# Patient Record
Sex: FEMALE | Race: BLACK OR AFRICAN AMERICAN | NOT HISPANIC OR LATINO | Employment: OTHER | URBAN - METROPOLITAN AREA
[De-identification: names, ages, dates, MRNs, and addresses within clinical notes are randomized per-mention and may not be internally consistent; named-entity substitution may affect disease eponyms.]

---

## 2017-08-31 PROBLEM — I10 HYPERTENSION: Status: ACTIVE | Noted: 2017-08-31

## 2017-08-31 PROBLEM — E78.5 HYPERLIPEMIA: Status: ACTIVE | Noted: 2017-08-31

## 2017-08-31 PROBLEM — J01.41 ACUTE RECURRENT PANSINUSITIS: Status: ACTIVE | Noted: 2017-08-31

## 2017-08-31 PROBLEM — I25.10 CORONARY ARTERY DISEASE: Status: ACTIVE | Noted: 2017-08-31

## 2017-08-31 PROBLEM — E11.9 DIABETES MELLITUS: Status: ACTIVE | Noted: 2017-08-31

## 2017-09-02 PROBLEM — E66.01 MORBID OBESITY DUE TO EXCESS CALORIES: Status: ACTIVE | Noted: 2017-09-02

## 2017-12-04 PROBLEM — J01.41 ACUTE RECURRENT PANSINUSITIS: Status: RESOLVED | Noted: 2017-08-31 | Resolved: 2017-12-04

## 2018-06-28 ENCOUNTER — LAB VISIT (OUTPATIENT)
Dept: LAB | Facility: HOSPITAL | Age: 68
End: 2018-06-28
Attending: INTERNAL MEDICINE
Payer: MEDICARE

## 2018-06-28 ENCOUNTER — OFFICE VISIT (OUTPATIENT)
Dept: CARDIOLOGY | Facility: CLINIC | Age: 68
End: 2018-06-28
Payer: MEDICARE

## 2018-06-28 VITALS
BODY MASS INDEX: 45.99 KG/M2 | WEIGHT: 293 LBS | SYSTOLIC BLOOD PRESSURE: 182 MMHG | DIASTOLIC BLOOD PRESSURE: 80 MMHG | HEIGHT: 67 IN | HEART RATE: 64 BPM

## 2018-06-28 DIAGNOSIS — I10 MALIGNANT HYPERTENSION: Primary | ICD-10-CM

## 2018-06-28 DIAGNOSIS — E66.01 MORBID OBESITY DUE TO EXCESS CALORIES: ICD-10-CM

## 2018-06-28 DIAGNOSIS — R60.0 LOWER LEG EDEMA: ICD-10-CM

## 2018-06-28 DIAGNOSIS — Z00.00 ROUTINE CHECK-UP: ICD-10-CM

## 2018-06-28 DIAGNOSIS — G47.33 OBSTRUCTIVE SLEEP APNEA: ICD-10-CM

## 2018-06-28 DIAGNOSIS — I10 MALIGNANT HYPERTENSION: ICD-10-CM

## 2018-06-28 DIAGNOSIS — R01.1 UNDIAGNOSED CARDIAC MURMURS: ICD-10-CM

## 2018-06-28 LAB
ALBUMIN SERPL BCP-MCNC: 3.2 G/DL
ALP SERPL-CCNC: 108 U/L
ALT SERPL W/O P-5'-P-CCNC: 29 U/L
ANION GAP SERPL CALC-SCNC: 7 MMOL/L
AST SERPL-CCNC: 23 U/L
BILIRUB SERPL-MCNC: 0.3 MG/DL
BUN SERPL-MCNC: 17 MG/DL
CALCIUM SERPL-MCNC: 10.2 MG/DL
CHLORIDE SERPL-SCNC: 108 MMOL/L
CO2 SERPL-SCNC: 30 MMOL/L
CREAT SERPL-MCNC: 1.1 MG/DL
EST. GFR  (AFRICAN AMERICAN): >60 ML/MIN/1.73 M^2
EST. GFR  (NON AFRICAN AMERICAN): 52.4 ML/MIN/1.73 M^2
GLUCOSE SERPL-MCNC: 268 MG/DL
POTASSIUM SERPL-SCNC: 4.6 MMOL/L
PROT SERPL-MCNC: 6.9 G/DL
SODIUM SERPL-SCNC: 145 MMOL/L

## 2018-06-28 PROCEDURE — 99999 PR PBB SHADOW E&M-NEW PATIENT-LVL IV: CPT | Mod: PBBFAC,,, | Performed by: INTERNAL MEDICINE

## 2018-06-28 PROCEDURE — 80053 COMPREHEN METABOLIC PANEL: CPT

## 2018-06-28 PROCEDURE — 36415 COLL VENOUS BLD VENIPUNCTURE: CPT | Mod: PO

## 2018-06-28 PROCEDURE — 93005 ELECTROCARDIOGRAM TRACING: CPT | Mod: PBBFAC,PO | Performed by: INTERNAL MEDICINE

## 2018-06-28 PROCEDURE — 93010 ELECTROCARDIOGRAM REPORT: CPT | Mod: S$PBB,,, | Performed by: INTERNAL MEDICINE

## 2018-06-28 PROCEDURE — 99204 OFFICE O/P NEW MOD 45 MIN: CPT | Mod: PBBFAC,PO | Performed by: INTERNAL MEDICINE

## 2018-06-28 PROCEDURE — 99202 OFFICE O/P NEW SF 15 MIN: CPT | Mod: S$PBB,,, | Performed by: INTERNAL MEDICINE

## 2018-06-28 RX ORDER — POTASSIUM CHLORIDE 750 MG/1
10 TABLET, EXTENDED RELEASE ORAL DAILY
COMMUNITY
Start: 2018-05-18 | End: 2018-11-15

## 2018-06-28 RX ORDER — HYDRALAZINE HYDROCHLORIDE 25 MG/1
25 TABLET, FILM COATED ORAL 3 TIMES DAILY
Qty: 90 TABLET | Refills: 1 | Status: SHIPPED | OUTPATIENT
Start: 2018-06-28 | End: 2018-08-31 | Stop reason: DRUGHIGH

## 2018-06-28 RX ORDER — TORSEMIDE 20 MG/1
20 TABLET ORAL DAILY PRN
COMMUNITY
Start: 2018-05-18 | End: 2018-11-28 | Stop reason: SDUPTHER

## 2018-06-28 RX ORDER — INDOMETHACIN 25 MG/1
CAPSULE ORAL
COMMUNITY
Start: 2018-04-25 | End: 2018-11-15

## 2018-06-28 RX ORDER — HYDROCHLOROTHIAZIDE 12.5 MG/1
TABLET ORAL
COMMUNITY
Start: 2018-05-30 | End: 2018-11-15

## 2018-06-28 RX ORDER — METFORMIN HYDROCHLORIDE 500 MG/1
500 TABLET ORAL
COMMUNITY
Start: 2018-06-17 | End: 2018-11-28 | Stop reason: CLARIF

## 2018-06-28 RX ORDER — VALSARTAN 160 MG/1
TABLET ORAL
COMMUNITY
Start: 2018-05-26 | End: 2018-08-31

## 2018-06-28 NOTE — PROGRESS NOTES
Subjective:    Patient ID:  Juhi Taylor is a 68 y.o. female who presents for Hypertension and Shortness of Breath        Hypertension   This is a chronic problem. The current episode started more than 1 year ago. The problem has been gradually worsening since onset. Pertinent negatives include no blurred vision, chest pain, malaise/fatigue, orthopnea, palpitations, PND or shortness of breath (SOME). Risk factors for coronary artery disease include dyslipidemia and obesity. Past treatments include calcium channel blockers, beta blockers, angiotensin blockers and diuretics. The current treatment provides mild improvement.   Shortness of Breath   Pertinent negatives include no abdominal pain, chest pain, claudication, fever, hemoptysis, leg swelling (WITH DILTIAZEM , BETTER OFF IT), orthopnea, PND, syncope or wheezing.     PT IS NEW TO THIS CLINIC, WAS SEEN IN 2016,NO RECORDS,STOPPED SMOKING IN 12/2016, DX WITH BREAST CANCER 2014, MASTECTOMY 1/2015, HAS BEEN TAKEN OFF THYROID SUPPLEMENT, BP 'S/ 90'S,HAD EDEMA WITH DIL;TIAZEM, ASLO LEG PAIN, PLUS LOWER BACK, TOOK INDOCIN, RECENT LABS AT [facility] , HAD VENOUS AND ARTERIAL DOPPLER IN 2016 WERE OK, MILD CAD IN 2005 THEN LATER AT [facility]  ?? 2010, SEE ROS     Past Medical History:   Diagnosis Date    Cancer     breast    Chronic sinusitis     Colitis     Coronary artery disease 2005    VERY MILD    Diabetes mellitus     Gout     Hyperlipemia     Hypertension     Hypertrophy of nasal turbinates     Multiple thyroid nodules     NICOLE (obstructive sleep apnea)     waiting on CPAP    Thyroid disease      Past Surgical History:   Procedure Laterality Date    BREAST SURGERY      Mastectomy with sentinal LN bx    CARDIAC CATHETERIZATION      CHOLECYSTECTOMY      COLONOSCOPY      ESOPHAGOGASTRODUODENOSCOPY      HAND SURGERY Right     HYSTERECTOMY      MASTECTOMY Bilateral     TUBAL LIGATION       Family History   Problem Relation Age of Onset    Cancer  Mother     Stroke Father     Hepatitis Brother     Asthma Daughter     Cancer Maternal Aunt      Social History     Social History    Marital status: Single     Spouse name: N/A    Number of children: N/A    Years of education: N/A     Social History Main Topics    Smoking status: Former Smoker     Quit date: 12/28/2016    Smokeless tobacco: Never Used      Comment: quit     Alcohol use No    Drug use: No    Sexual activity: Not Asked     Other Topics Concern    None     Social History Narrative    None       Review of patient's allergies indicates:   Allergen Reactions    Amitiza [lubiprostone] Other (See Comments)     Does not remember    Linzess [linaclotide] Other (See Comments)     Does not remember       Current Outpatient Prescriptions:     allopurinol (ZYLOPRIM) 300 MG tablet, Take 300 mg by mouth once daily., Disp: , Rfl:     carvedilol (COREG) 25 MG tablet, Take 25 mg by mouth 2 (two) times daily with meals., Disp: , Rfl:     ergocalciferol (ERGOCALCIFEROL) 50,000 unit Cap, Take 50,000 Units by mouth every 14 (fourteen) days., Disp: , Rfl:     folic acid (FOLVITE) 1 MG tablet, Take 1 mg by mouth once daily., Disp: , Rfl:     furosemide (LASIX) 20 MG tablet, Take 20 mg by mouth daily as needed., Disp: , Rfl:     hydroCHLOROthiazide (HYDRODIURIL) 12.5 MG Tab, , Disp: , Rfl:     indomethacin (INDOCIN) 25 MG capsule, , Disp: , Rfl:     levocetirizine (XYZAL) 5 MG tablet, Take 5 mg by mouth once daily., Disp: , Rfl:     MAGNESIUM CITRATE ORAL, Take 500 mg by mouth once daily., Disp: , Rfl:     metFORMIN (GLUCOPHAGE) 500 MG tablet, , Disp: , Rfl:     niacin 500 MG CpSR, Take 500 mg by mouth once daily., Disp: , Rfl:     potassium chloride SA (K-DUR,KLOR-CON) 10 MEQ tablet, , Disp: , Rfl:     thiamine (VITAMIN B-1) 250 MG tablet, Take 500 mg by mouth once daily., Disp: , Rfl:     torsemide (DEMADEX) 20 MG Tab, , Disp: , Rfl:     valsartan (DIOVAN) 160 MG tablet, , Disp: ,  "Rfl:     tamoxifen (NOLVADEX) 20 MG Tab, Take 1 tablet (20 mg total) by mouth once daily., Disp: 30 tablet, Rfl: 11    Review of Systems   Constitution: Negative for chills, diaphoresis, fever, weakness, malaise/fatigue and night sweats (SOME).   HENT: Negative for congestion, hearing loss and nosebleeds.    Eyes: Negative for blurred vision, double vision and visual disturbance.   Cardiovascular: Positive for dyspnea on exertion. Negative for chest pain, claudication, cyanosis, irregular heartbeat, leg swelling (WITH DILTIAZEM , BETTER OFF IT), near-syncope, orthopnea, palpitations, paroxysmal nocturnal dyspnea and syncope.        BP UP   Respiratory: Negative for cough, hemoptysis, shortness of breath (SOME) and wheezing.         NOT USING CPAP, CANNOT GET/ DR CONNLY   Endocrine: Negative for cold intolerance, heat intolerance and polyuria.   Hematologic/Lymphatic: Negative for adenopathy. Does not bruise/bleed easily.   Skin: Negative for itching (CHRONIC).   Musculoskeletal: Positive for joint pain. Negative for back pain (CHRONIC) and falls.   Gastrointestinal: Negative for abdominal pain, change in bowel habit, dysphagia, heartburn, hematemesis, jaundice and melena.   Genitourinary: Negative for dysuria, flank pain and frequency.   Neurological: Negative for brief paralysis, dizziness (OCC), focal weakness, light-headedness, loss of balance, numbness and tremors.   Psychiatric/Behavioral: Negative for altered mental status, depression, memory loss and substance abuse. The patient is not nervous/anxious.    Allergic/Immunologic: Negative for hives and persistent infections.        Objective:      Vitals:    06/28/18 1354 06/28/18 1441   BP: (!) 206/91 (!) 182/80   Pulse: 64    Weight: (!) 144.7 kg (319 lb 0.1 oz)    Height: 5' 7" (1.702 m)    PainSc:   8    PainLoc: Back      Body mass index is 49.96 kg/m².    Physical Exam   Constitutional: She is oriented to person, place, and time. She appears " well-developed and well-nourished. She is active.   OBESE   HENT:   Head: Normocephalic and atraumatic.   Mouth/Throat: Oropharynx is clear and moist and mucous membranes are normal.   Eyes: Conjunctivae and EOM are normal. Pupils are equal, round, and reactive to light.   Neck: Trachea normal and normal range of motion. Neck supple. Normal carotid pulses, no hepatojugular reflux and no JVD present. Carotid bruit is not present. No tracheal deviation, no edema and no erythema present. No thyromegaly present.   Cardiovascular: Normal rate and regular rhythm.   No extrasystoles are present. PMI is not displaced.  Exam reveals no gallop and no friction rub.    Murmur heard.   Systolic murmur is present with a grade of 1/6  at the upper right sternal border, lower left sternal border  Pulses:       Carotid pulses are 2+ on the right side, and 2+ on the left side.       Radial pulses are 2+ on the right side, and 2+ on the left side.        Posterior tibial pulses are 2+ on the right side, and 2+ on the left side.   Pulmonary/Chest: Effort normal and breath sounds normal. No tachypnea and no bradypnea. She has no wheezes. She has no rales. She exhibits no tenderness.   Abdominal: Soft. Bowel sounds are normal. She exhibits no distension and no mass. There is no hepatosplenomegaly. There is no tenderness. There is no guarding and no CVA tenderness.   Musculoskeletal: Normal range of motion. She exhibits no edema or tenderness.   VERY SLOW GAIT   Lymphadenopathy:     She has no cervical adenopathy.   Neurological: She is alert and oriented to person, place, and time. She has normal strength. She displays no tremor. No cranial nerve deficit. She exhibits normal muscle tone. Coordination normal.   Skin: Skin is warm and dry. No rash noted. No cyanosis or erythema. No pallor.   Psychiatric: She has a normal mood and affect. Her speech is normal and behavior is normal. Judgment and thought content normal.               ..     Chemistry        Component Value Date/Time     09/01/2017 0510    K 4.7 09/01/2017 0510     09/01/2017 0510    CO2 27 09/01/2017 0510    BUN 19 (H) 09/01/2017 0510    CREATININE 0.73 09/01/2017 0510     (H) 09/01/2017 0510        Component Value Date/Time    CALCIUM 9.0 09/01/2017 0510    ALKPHOS 100 08/18/2017 1104    AST 23 08/18/2017 1104    ALT 35 08/18/2017 1104    BILITOT 0.5 08/18/2017 1104    ESTGFRAFRICA >60 09/01/2017 0510    EGFRNONAA >60 09/01/2017 0510            ..  Lab Results   Component Value Date    CHOL 181 01/06/2010    CHOL 162 09/04/2009    CHOL 166 10/16/2008     Lab Results   Component Value Date    HDL 38 (L) 01/06/2010    HDL 42 09/04/2009    HDL 41 10/16/2008     Lab Results   Component Value Date    LDLCALC 122.4 01/06/2010    LDLCALC 100.8 09/04/2009    LDLCALC 111.8 10/16/2008     Lab Results   Component Value Date    TRIG 103 01/06/2010    TRIG 96 09/04/2009    TRIG 66 10/16/2008     Lab Results   Component Value Date    CHOLHDL 21.0 01/06/2010    CHOLHDL 25.9 09/04/2009    CHOLHDL 24.7 10/16/2008     ..  Lab Results   Component Value Date    WBC 10.10 09/01/2017    HGB 12.0 09/01/2017    HCT 39.5 09/01/2017    MCV 84 09/01/2017     09/01/2017       Test(s) Reviewed  I have reviewed the following in detail:  [] Stress test   [] Angiography   [] Echocardiogram   [x] Labs   [x] Other:       Assessment:         ICD-10-CM ICD-9-CM   1. Malignant hypertension I10 401.0   2. Undiagnosed cardiac murmurs R01.1 785.2   3. Lower leg edema R60.0 782.3   4. Morbid obesity due to excess calories E66.01 278.01     Problem List Items Addressed This Visit        Cardiac/Vascular    Malignant hypertension - Primary    Undiagnosed cardiac murmurs       Endocrine    Morbid obesity due to excess calories       Other    Lower leg edema           Plan:         EKG SR, LVH, LAD, GET RECORDS, START HYDRALAZINE, NEEDS CPAP, HAS BEEN TRYING, CHECK ECHO, LABS, WEIGHT LOSS,STATES WAS  EVALUATED FOR GASTRIC SLEEVE, AVOID NSAIDS RTC IN FEW WEEKS  Malignant hypertension    Undiagnosed cardiac murmurs    Lower leg edema  Comments:  WORSE WITH CCB    Morbid obesity due to excess calories    RTC Low level/low impact aerobic exercise 5x's/wk. Heart healthy diet and risk factor modification.    See labs and med orders.    Aerobic exercise 5x's/wk. Heart healthy diet and risk factor modification.    See labs and med orders.

## 2018-06-29 ENCOUNTER — TELEPHONE (OUTPATIENT)
Dept: CARDIOLOGY | Facility: CLINIC | Age: 68
End: 2018-06-29

## 2018-07-05 DIAGNOSIS — Z00.00 ROUTINE CHECK-UP: Primary | ICD-10-CM

## 2018-07-06 ENCOUNTER — CLINICAL SUPPORT (OUTPATIENT)
Dept: CARDIOLOGY | Facility: CLINIC | Age: 68
End: 2018-07-06
Attending: INTERNAL MEDICINE
Payer: MEDICARE

## 2018-07-06 VITALS
BODY MASS INDEX: 45.99 KG/M2 | DIASTOLIC BLOOD PRESSURE: 92 MMHG | WEIGHT: 293 LBS | HEART RATE: 80 BPM | HEIGHT: 67 IN | SYSTOLIC BLOOD PRESSURE: 158 MMHG

## 2018-07-06 DIAGNOSIS — R01.1 UNDIAGNOSED CARDIAC MURMURS: ICD-10-CM

## 2018-07-06 DIAGNOSIS — I10 MALIGNANT HYPERTENSION: ICD-10-CM

## 2018-07-06 PROCEDURE — 99999 PR PBB SHADOW E&M-EST. PATIENT-LVL II: CPT | Mod: PBBFAC,,,

## 2018-07-06 PROCEDURE — 99212 OFFICE O/P EST SF 10 MIN: CPT | Mod: PBBFAC,PO

## 2018-07-06 PROCEDURE — 93306 TTE W/DOPPLER COMPLETE: CPT | Mod: PBBFAC,PO | Performed by: INTERNAL MEDICINE

## 2018-07-10 LAB
ASCENDING AORTA: 3.37 CM
AV MEAN GRADIENT: 5.8 MMHG
AV PEAK GRADIENT: 9.6 MMHG
AV VALVE AREA: 2.23 CM2
BSA FOR ECHO PROCEDURE: 2.62 M2
CV ECHO LV RWT: 0.56 CM
DOP CALC AO PEAK VEL: 1.55 M/S
DOP CALC AO VTI: 29.17 CM
DOP CALC LVOT AREA: 3.76 CM2
DOP CALC LVOT DIAMETER: 2.19 CM
DOP CALC LVOT STROKE VOLUME: 65.1 CM3
DOP CALCLVOT PEAK VEL VTI: 17.29 CM
E WAVE DECELERATION TIME: 103.62 MSEC
E/A RATIO: 0.77
E/E' RATIO: 17.8
ECHO LV POSTERIOR WALL: 1.62 CM (ref 0.6–1.1)
FRACTIONAL SHORTENING: 30 % (ref 28–44)
INTERVENTRICULAR SEPTUM: 1.67 CM (ref 0.6–1.1)
IVRT: 0.14 MSEC
LA MAJOR: 4.7 CM
LA MINOR: 5.05 CM
LA WIDTH: 4.68 CM
LEFT ATRIUM SIZE: 4.22 CM
LEFT ATRIUM VOLUME INDEX: 31.2 ML/M2
LEFT ATRIUM VOLUME: 81.73 CM3
LEFT INTERNAL DIMENSION IN SYSTOLE: 4.12 CM (ref 2.1–4)
LEFT VENTRICLE MASS INDEX: 180.5 G/M2
LEFT VENTRICULAR INTERNAL DIMENSION IN DIASTOLE: 5.88 CM (ref 3.5–6)
LEFT VENTRICULAR MASS: 473.01 G
LV LATERAL E/E' RATIO: 17.8
LV SEPTAL E/E' RATIO: 17.8
MV PEAK A VEL: 1.16 M/S
MV PEAK E VEL: 0.89 M/S
MV STENOSIS PRESSURE HALF TIME: 30.05 MS
MV VALVE AREA P 1/2 METHOD: 7.32 CM2
PISA TR MAX VEL: 2.87 M/S
PULM VEIN S/D RATIO: 1
PV PEAK D VEL: 0.4 M/S
PV PEAK S VEL: 0.4 M/S
RA MAJOR: 5.22 CM
RA WIDTH: 4.5 CM
RIGHT VENTRICULAR END-DIASTOLIC DIMENSION: 3.39 CM
SINUS: 3.08 CM
STJ: 2.69 CM
TDI LATERAL: 0.05
TDI SEPTAL: 0.05
TDI: 0.05
TR MAX PG: 32.95 MMHG
TRICUSPID ANNULAR PLANE SYSTOLIC EXCURSION: 0.02 CM

## 2018-07-18 ENCOUNTER — TELEPHONE (OUTPATIENT)
Dept: BARIATRICS | Facility: CLINIC | Age: 68
End: 2018-07-18

## 2018-08-31 ENCOUNTER — LAB VISIT (OUTPATIENT)
Dept: LAB | Facility: HOSPITAL | Age: 68
End: 2018-08-31
Attending: INTERNAL MEDICINE
Payer: MEDICARE

## 2018-08-31 ENCOUNTER — OFFICE VISIT (OUTPATIENT)
Dept: CARDIOLOGY | Facility: CLINIC | Age: 68
End: 2018-08-31
Attending: INTERNAL MEDICINE
Payer: MEDICARE

## 2018-08-31 VITALS
DIASTOLIC BLOOD PRESSURE: 84 MMHG | HEIGHT: 67 IN | SYSTOLIC BLOOD PRESSURE: 164 MMHG | WEIGHT: 293 LBS | HEART RATE: 59 BPM | BODY MASS INDEX: 45.99 KG/M2

## 2018-08-31 DIAGNOSIS — E66.01 MORBID OBESITY WITH BODY MASS INDEX (BMI) GREATER THAN OR EQUAL TO 50: ICD-10-CM

## 2018-08-31 DIAGNOSIS — I51.89 DIASTOLIC DYSFUNCTION: ICD-10-CM

## 2018-08-31 DIAGNOSIS — E78.00 PURE HYPERCHOLESTEROLEMIA: ICD-10-CM

## 2018-08-31 DIAGNOSIS — I25.118 CORONARY ARTERY DISEASE OF NATIVE ARTERY OF NATIVE HEART WITH STABLE ANGINA PECTORIS: ICD-10-CM

## 2018-08-31 DIAGNOSIS — I34.0 NON-RHEUMATIC MITRAL REGURGITATION: ICD-10-CM

## 2018-08-31 DIAGNOSIS — I10 ESSENTIAL HYPERTENSION: ICD-10-CM

## 2018-08-31 DIAGNOSIS — Z78.9 STATIN INTOLERANCE: ICD-10-CM

## 2018-08-31 DIAGNOSIS — I10 ESSENTIAL HYPERTENSION: Primary | ICD-10-CM

## 2018-08-31 LAB
ALBUMIN SERPL BCP-MCNC: 3.3 G/DL
ALP SERPL-CCNC: 91 U/L
ALT SERPL W/O P-5'-P-CCNC: 13 U/L
ANION GAP SERPL CALC-SCNC: 7 MMOL/L
AST SERPL-CCNC: 15 U/L
BILIRUB SERPL-MCNC: 0.4 MG/DL
BUN SERPL-MCNC: 21 MG/DL
CALCIUM SERPL-MCNC: 10.1 MG/DL
CHLORIDE SERPL-SCNC: 106 MMOL/L
CHOLEST SERPL-MCNC: 172 MG/DL
CHOLEST/HDLC SERPL: 4.5 {RATIO}
CO2 SERPL-SCNC: 26 MMOL/L
CREAT SERPL-MCNC: 0.9 MG/DL
EST. GFR  (AFRICAN AMERICAN): >60 ML/MIN/1.73 M^2
EST. GFR  (NON AFRICAN AMERICAN): >60 ML/MIN/1.73 M^2
GLUCOSE SERPL-MCNC: 106 MG/DL
HDLC SERPL-MCNC: 38 MG/DL
HDLC SERPL: 22.1 %
LDLC SERPL CALC-MCNC: 112.2 MG/DL
NONHDLC SERPL-MCNC: 134 MG/DL
POTASSIUM SERPL-SCNC: 4.2 MMOL/L
PROT SERPL-MCNC: 6.9 G/DL
SODIUM SERPL-SCNC: 139 MMOL/L
TRIGL SERPL-MCNC: 109 MG/DL

## 2018-08-31 PROCEDURE — 80061 LIPID PANEL: CPT

## 2018-08-31 PROCEDURE — 99214 OFFICE O/P EST MOD 30 MIN: CPT | Mod: S$PBB,,, | Performed by: INTERNAL MEDICINE

## 2018-08-31 PROCEDURE — 99999 PR PBB SHADOW E&M-EST. PATIENT-LVL IV: CPT | Mod: PBBFAC,,, | Performed by: INTERNAL MEDICINE

## 2018-08-31 PROCEDURE — 36415 COLL VENOUS BLD VENIPUNCTURE: CPT | Mod: PO

## 2018-08-31 PROCEDURE — 99214 OFFICE O/P EST MOD 30 MIN: CPT | Mod: PBBFAC,PO | Performed by: INTERNAL MEDICINE

## 2018-08-31 PROCEDURE — 80053 COMPREHEN METABOLIC PANEL: CPT

## 2018-08-31 RX ORDER — HYDRALAZINE HYDROCHLORIDE 50 MG/1
50 TABLET, FILM COATED ORAL EVERY 12 HOURS
Qty: 180 TABLET | Refills: 1 | Status: SHIPPED | OUTPATIENT
Start: 2018-08-31 | End: 2018-11-15

## 2018-08-31 RX ORDER — IRBESARTAN 300 MG/1
300 TABLET ORAL NIGHTLY
Qty: 90 TABLET | Refills: 1 | Status: SHIPPED | OUTPATIENT
Start: 2018-08-31 | End: 2019-04-04 | Stop reason: SDUPTHER

## 2018-08-31 RX ORDER — SPIRONOLACTONE 25 MG/1
25 TABLET ORAL DAILY
COMMUNITY
End: 2018-11-30

## 2018-08-31 RX ORDER — MELOXICAM 15 MG/1
15 TABLET ORAL DAILY
COMMUNITY
End: 2020-08-28

## 2018-08-31 RX ORDER — IRBESARTAN 150 MG/1
150 TABLET ORAL NIGHTLY
COMMUNITY
End: 2018-08-31 | Stop reason: DRUGHIGH

## 2018-08-31 RX ORDER — PITAVASTATIN CALCIUM 2.09 MG/1
2 TABLET, FILM COATED ORAL DAILY
Qty: 90 TABLET | Refills: 1 | Status: SHIPPED | OUTPATIENT
Start: 2018-08-31 | End: 2018-11-15

## 2018-08-31 RX ORDER — GLIPIZIDE 5 MG/1
5 TABLET, FILM COATED, EXTENDED RELEASE ORAL 2 TIMES DAILY
COMMUNITY
End: 2020-08-28

## 2018-08-31 NOTE — PROGRESS NOTES
Subjective:    Patient ID:  Juhi Taylor is a 68 y.o. female who presents for Coronary Artery Disease (Labs and Echo)        HPI  DISCUSSED LABS AND GOALS, CR 1.1, BG , MEDS CHANGED, ECHO MILD MR, DIASTOLIC DYSFUNCTION, HAS BEEN DOING BETTER, NO SOB, BUT SUFFERING WITH BACK AND HIP,BP BETTER BUT SBP STILL UP WITH PAIN,  TO SEE PAIN MEDS AND ENDOCRINOLOGIST FOR THYROID, RECENT US, NODULE, SEE ROS    Past Medical History:   Diagnosis Date    Cancer     breast    Chronic sinusitis     Colitis     Coronary artery disease     VERY MILD    Diabetes mellitus     Gout     Hyperlipemia     Hypertension     Hypertrophy of nasal turbinates     Multiple thyroid nodules     NICOLE (obstructive sleep apnea)     waiting on CPAP    Thyroid disease      Past Surgical History:   Procedure Laterality Date    BREAST SURGERY      Mastectomy with sentinal LN bx    CARDIAC CATHETERIZATION      CHOLECYSTECTOMY      COLONOSCOPY      ESOPHAGOGASTRODUODENOSCOPY      HAND SURGERY Right     HYSTERECTOMY      MASTECTOMY Bilateral     TUBAL LIGATION       Family History   Problem Relation Age of Onset    Cancer Mother     Stroke Father     Hepatitis Brother     Asthma Daughter     Cancer Maternal Aunt      Social History     Socioeconomic History    Marital status: Single     Spouse name: Not on file    Number of children: Not on file    Years of education: Not on file    Highest education level: Not on file   Social Needs    Financial resource strain: Not on file    Food insecurity - worry: Not on file    Food insecurity - inability: Not on file    Transportation needs - medical: Not on file    Transportation needs - non-medical: Not on file   Occupational History    Not on file   Tobacco Use    Smoking status: Former Smoker     Last attempt to quit: 2016     Years since quittin.6    Smokeless tobacco: Never Used    Tobacco comment: quit    Substance and Sexual Activity     Alcohol use: No    Drug use: No    Sexual activity: Not on file   Other Topics Concern    Not on file   Social History Narrative    Not on file       Review of patient's allergies indicates:   Allergen Reactions    Amitiza [lubiprostone] Other (See Comments)     Does not remember    Linzess [linaclotide] Other (See Comments)     Does not remember       Current Outpatient Medications:     allopurinol (ZYLOPRIM) 300 MG tablet, Take 300 mg by mouth once daily., Disp: , Rfl:     carvedilol (COREG) 25 MG tablet, Take 25 mg by mouth 2 (two) times daily with meals., Disp: , Rfl:     ergocalciferol (ERGOCALCIFEROL) 50,000 unit Cap, Take 50,000 Units by mouth every 14 (fourteen) days., Disp: , Rfl:     folic acid (FOLVITE) 1 MG tablet, Take 1 mg by mouth once daily., Disp: , Rfl:     furosemide (LASIX) 20 MG tablet, Take 20 mg by mouth daily as needed., Disp: , Rfl:     glipiZIDE (GLUCOTROL) 5 MG TR24, Take 5 mg by mouth 2 (two) times daily., Disp: , Rfl:     hydroCHLOROthiazide (HYDRODIURIL) 12.5 MG Tab, , Disp: , Rfl:     levocetirizine (XYZAL) 5 MG tablet, Take 5 mg by mouth once daily., Disp: , Rfl:     MAGNESIUM CITRATE ORAL, Take 500 mg by mouth once daily., Disp: , Rfl:     meloxicam (MOBIC) 15 MG tablet, Take 15 mg by mouth once daily., Disp: , Rfl:     metFORMIN (GLUCOPHAGE) 500 MG tablet, Take 500 mg by mouth daily with breakfast. , Disp: , Rfl:     potassium chloride SA (K-DUR,KLOR-CON) 10 MEQ tablet, Take 10 mEq by mouth once daily. , Disp: , Rfl:     spironolactone (ALDACTONE) 25 MG tablet, Take 25 mg by mouth once daily., Disp: , Rfl:     torsemide (DEMADEX) 20 MG Tab, , Disp: , Rfl:     hydrALAZINE (APRESOLINE) 50 MG tablet, Take 1 tablet (50 mg total) by mouth every 12 (twelve) hours., Disp: 180 tablet, Rfl: 1    indomethacin (INDOCIN) 25 MG capsule, , Disp: , Rfl:     irbesartan (AVAPRO) 300 MG tablet, Take 1 tablet (300 mg total) by mouth every evening., Disp: 90 tablet, Rfl: 1     "pitavastatin calcium (LIVALO) 2 mg Tab tablet, Take 1 tablet (2 mg total) by mouth once daily., Disp: 90 tablet, Rfl: 1    tamoxifen (NOLVADEX) 20 MG Tab, Take 1 tablet (20 mg total) by mouth once daily., Disp: 30 tablet, Rfl: 11    thiamine (VITAMIN B-1) 250 MG tablet, Take 500 mg by mouth once daily., Disp: , Rfl:     Review of Systems   Constitution: Negative for chills, diaphoresis, fever, weakness, malaise/fatigue and night sweats (SOME).   HENT: Negative for congestion, hearing loss and nosebleeds.    Eyes: Negative for blurred vision, double vision and visual disturbance.   Cardiovascular: Negative for chest pain, claudication, cyanosis, dyspnea on exertion (MILD), irregular heartbeat, leg swelling (WITH DILTIAZEM , BETTER OFF IT), near-syncope, orthopnea, palpitations, paroxysmal nocturnal dyspnea and syncope.        BP UP   Respiratory: Negative for cough, hemoptysis, shortness of breath and wheezing.         NOT USING CPAP, CANNOT GET/ DR COBB   Endocrine: Negative for cold intolerance, heat intolerance and polyuria.   Hematologic/Lymphatic: Negative for adenopathy. Does not bruise/bleed easily.   Skin: Negative for itching (CHRONIC).   Musculoskeletal: Positive for joint pain. Negative for back pain (CHRONIC) and falls.   Gastrointestinal: Negative for abdominal pain, change in bowel habit, dysphagia, heartburn, hematemesis, jaundice and melena.   Genitourinary: Negative for dysuria, flank pain and frequency.   Neurological: Negative for brief paralysis, dizziness (OCC), focal weakness, light-headedness, loss of balance, numbness and tremors.   Psychiatric/Behavioral: Negative for altered mental status, depression and memory loss.        Objective:      Vitals:    08/31/18 1047   BP: (!) 164/84   Pulse: (!) 59   Weight: (!) 145.9 kg (321 lb 10.4 oz)   Height: 5' 7" (1.702 m)   PainSc:   5     Body mass index is 50.38 kg/m².    Physical Exam   Constitutional: She is oriented to person, place, and time. " She appears well-developed and well-nourished. She is active.   OBESE   HENT:   Head: Normocephalic and atraumatic.   Mouth/Throat: Oropharynx is clear and moist and mucous membranes are normal.   Eyes: Conjunctivae and EOM are normal. Pupils are equal, round, and reactive to light.   Neck: Trachea normal and normal range of motion. Neck supple. Normal carotid pulses, no hepatojugular reflux and no JVD present. Carotid bruit is not present. No tracheal deviation, no edema and no erythema present. No thyromegaly present.   Cardiovascular: Normal rate and regular rhythm.  No extrasystoles are present. PMI is not displaced. Exam reveals no gallop and no friction rub.   Murmur heard.   Systolic murmur is present with a grade of 1/6 at the upper right sternal border and lower left sternal border.  Pulses:       Carotid pulses are 2+ on the right side, and 2+ on the left side.       Radial pulses are 2+ on the right side, and 2+ on the left side.        Posterior tibial pulses are 2+ on the right side, and 2+ on the left side.   Pulmonary/Chest: Effort normal and breath sounds normal. No tachypnea and no bradypnea. She has no wheezes. She has no rales. She exhibits no tenderness.   Abdominal: Soft. Bowel sounds are normal. She exhibits no distension and no mass. There is no hepatosplenomegaly. There is no tenderness. There is no guarding and no CVA tenderness.   Musculoskeletal: Normal range of motion. She exhibits no edema or tenderness.   SLOW GAIT, USES A CANE   Lymphadenopathy:     She has no cervical adenopathy.   Neurological: She is alert and oriented to person, place, and time. She has normal strength. She displays no tremor. No cranial nerve deficit.   Skin: Skin is warm and dry. No rash noted. No cyanosis.   Psychiatric: She has a normal mood and affect. Her speech is normal and behavior is normal.               ..    Chemistry        Component Value Date/Time     06/28/2018 1514    K 4.6 06/28/2018 1514      06/28/2018 1514    CO2 30 (H) 06/28/2018 1514    BUN 17 06/28/2018 1514    CREATININE 1.1 06/28/2018 1514     (H) 06/28/2018 1514        Component Value Date/Time    CALCIUM 10.2 06/28/2018 1514    ALKPHOS 108 06/28/2018 1514    AST 23 06/28/2018 1514    ALT 29 06/28/2018 1514    BILITOT 0.3 06/28/2018 1514    ESTGFRAFRICA >60.0 06/28/2018 1514    EGFRNONAA 52.4 (A) 06/28/2018 1514            ..  Lab Results   Component Value Date    CHOL 181 01/06/2010    CHOL 162 09/04/2009    CHOL 166 10/16/2008     Lab Results   Component Value Date    HDL 38 (L) 01/06/2010    HDL 42 09/04/2009    HDL 41 10/16/2008     Lab Results   Component Value Date    LDLCALC 122.4 01/06/2010    LDLCALC 100.8 09/04/2009    LDLCALC 111.8 10/16/2008     Lab Results   Component Value Date    TRIG 103 01/06/2010    TRIG 96 09/04/2009    TRIG 66 10/16/2008     Lab Results   Component Value Date    CHOLHDL 21.0 01/06/2010    CHOLHDL 25.9 09/04/2009    CHOLHDL 24.7 10/16/2008     ..  Lab Results   Component Value Date    WBC 10.10 09/01/2017    HGB 12.0 09/01/2017    HCT 39.5 09/01/2017    MCV 84 09/01/2017     09/01/2017       Test(s) Reviewed  I have reviewed the following in detail:  [] Stress test   [] Angiography   [x] Echocardiogram   [x] Labs   [] Other:       Assessment:         ICD-10-CM ICD-9-CM   1. Essential hypertension I10 401.9   2. Coronary artery disease of native artery of native heart with stable angina pectoris I25.118 414.01     413.9   3. Non-rheumatic mitral regurgitation I34.0 424.0   4. Morbid obesity with body mass index (BMI) greater than or equal to 50 E66.01 278.01   5. Diastolic dysfunction I51.9 429.9   6. Pure hypercholesterolemia E78.00 272.0     Problem List Items Addressed This Visit        Cardiac/Vascular    Coronary artery disease    Hyperlipemia    Hypertension - Primary    Non-rheumatic mitral regurgitation    Diastolic dysfunction       Endocrine    Morbid obesity with body mass  index (BMI) greater than or equal to 50           Plan:     INCREASE IRBESARTAN , HYDRALAZINE 50 BID, ADD LIVALO, DECLINES MOST STAINS, SE'S, LONG DISCUSSION REGARDING MEDS AND NEED, ALL OTHER CV CLINICALLY STABLE, NO ANGINA, NO HF, NO TIA, NO CLINICAL ARRHYTHMIA,CONTINUE CURRENT MEDS, EDUCATION, DIET, EXERCISE, SERIOUS WEIGHT LOSS, CALL WITH BP, RTC IN 6 MO      Essential hypertension    Coronary artery disease of native artery of native heart with stable angina pectoris    Non-rheumatic mitral regurgitation  Comments:  MILD, AFTERLOAD REDUCTION    Morbid obesity with body mass index (BMI) greater than or equal to 50    Diastolic dysfunction    Pure hypercholesterolemia    Other orders  -     irbesartan (AVAPRO) 300 MG tablet; Take 1 tablet (300 mg total) by mouth every evening.  Dispense: 90 tablet; Refill: 1  -     hydrALAZINE (APRESOLINE) 50 MG tablet; Take 1 tablet (50 mg total) by mouth every 12 (twelve) hours.  Dispense: 180 tablet; Refill: 1  -     pitavastatin calcium (LIVALO) 2 mg Tab tablet; Take 1 tablet (2 mg total) by mouth once daily.  Dispense: 90 tablet; Refill: 1    RTC Low level/low impact aerobic exercise 5x's/wk. Heart healthy diet and risk factor modification.    See labs and med orders.    Aerobic exercise 5x's/wk. Heart healthy diet and risk factor modification.    See labs and med orders.

## 2018-11-15 ENCOUNTER — OFFICE VISIT (OUTPATIENT)
Dept: ALLERGY | Facility: CLINIC | Age: 68
End: 2018-11-15
Payer: MEDICARE

## 2018-11-15 ENCOUNTER — LAB VISIT (OUTPATIENT)
Dept: LAB | Facility: HOSPITAL | Age: 68
End: 2018-11-15
Attending: ALLERGY & IMMUNOLOGY
Payer: MEDICARE

## 2018-11-15 VITALS
HEIGHT: 67 IN | BODY MASS INDEX: 45.99 KG/M2 | DIASTOLIC BLOOD PRESSURE: 96 MMHG | SYSTOLIC BLOOD PRESSURE: 136 MMHG | WEIGHT: 293 LBS

## 2018-11-15 DIAGNOSIS — J31.0 CHRONIC RHINITIS: Primary | ICD-10-CM

## 2018-11-15 DIAGNOSIS — R06.00 DYSPNEA, UNSPECIFIED TYPE: ICD-10-CM

## 2018-11-15 DIAGNOSIS — R49.0 HOARSENESS OF VOICE: ICD-10-CM

## 2018-11-15 DIAGNOSIS — J31.0 CHRONIC RHINITIS: ICD-10-CM

## 2018-11-15 DIAGNOSIS — J32.9 SINUSITIS, UNSPECIFIED CHRONICITY, UNSPECIFIED LOCATION: ICD-10-CM

## 2018-11-15 LAB — IGE SERPL-ACNC: <35 IU/ML

## 2018-11-15 PROCEDURE — 82785 ASSAY OF IGE: CPT

## 2018-11-15 PROCEDURE — 36415 COLL VENOUS BLD VENIPUNCTURE: CPT | Mod: PO

## 2018-11-15 PROCEDURE — 99204 OFFICE O/P NEW MOD 45 MIN: CPT | Mod: S$PBB,,, | Performed by: ALLERGY & IMMUNOLOGY

## 2018-11-15 PROCEDURE — 86003 ALLG SPEC IGE CRUDE XTRC EA: CPT | Mod: 59

## 2018-11-15 PROCEDURE — 86003 ALLG SPEC IGE CRUDE XTRC EA: CPT

## 2018-11-15 PROCEDURE — 99213 OFFICE O/P EST LOW 20 MIN: CPT | Mod: PBBFAC,PO | Performed by: ALLERGY & IMMUNOLOGY

## 2018-11-15 PROCEDURE — 99999 PR PBB SHADOW E&M-EST. PATIENT-LVL III: CPT | Mod: PBBFAC,,, | Performed by: ALLERGY & IMMUNOLOGY

## 2018-11-15 NOTE — PROGRESS NOTES
ALLERGY & IMMUNOLOGY CLINIC - INITIAL CONSULTATION     HISTORY OF PRESENT ILLNESS     Patient ID: Juhi Taylor is a 68 y.o. female    CC: allergy evaluation    HPI: 67 yo woman presents for initial allergy evaluation.    August 2017 - underwent sinus surgery for blocked sinuses. Ever since surgery, she has had hoarseness, green sputum, constant post-nasal drip. Did a six MONTH course of antibiotics, did a sinus rinse containing antibiotics for six months, no significant improvement. Symptoms are associated with facial pressure, headaches, ocular pain. She cannot identify any exacerbating factors. She is taking xyzal and loratadine with minimal relief.     Sense of smell is decreased, but not absent.    She has had bloodwork for allergies before in [place] - I cannot see these results in her chart.    She has had sputum studies that she says showed bacteria and fungi (I do not see these in her chart).     She also reports dyspnea, which is associated with the feeling of something stuck in her airway. No wheezing. Daughter has asthma.     REVIEW OF SYSTEMS     CONST: no F/C/NS, no unintentional weight changes  NEURO: + H/A, no weakness, no paresthesias  EYES: no discharge, no pruritus, no erythema  EARS: no hearing loss, no sensation of fullness  NOSE: + congestion, + rhinorrhea, + discharge, no itching, no sneezing  PULM: no SOB, ? wheezing, occasional cough  CV: no CP, no palpitations, no leg swelling  GI: no dysphagia, no heartburn, no pain, no N/V/D, no BRBPR/melena  MSK: + joint pain, no muscle pain  DERM: no rashes, no skin breaks     MEDICAL HISTORY     MedHx: active problems reviewed  SurgHx: sinus surgery August 2017  SocHx: former smoker  FamHx: daughter with asthma  Allergies: Linzess and Amitiza - one causes nausea, the other palpitations, btu she can't remember which is which  Medications: MAR reviewed    H/o Asthma: denies  H/o Eczema: denies  H/o Rhinitis: see HPI  Oral Allergy:  denies  Food  "Allergy: denies  Venom Allergy: denies  Latex Allergy: denies  Other Allergies: denies  Env/Occ: denies any evironmental or occupational exposures     PHYSICAL EXAM     VS: BP (!) 136/96   Ht 5' 7" (1.702 m)   Wt (!) 148.5 kg (327 lb 6.1 oz)   BMI 51.28 kg/m²   GENERAL: AAOx4, NAD, well-appearing, cooperative  EYES: PERRL, EOMI, no conjunctival injection, no discharge, + infraorbital shiners  EARS: external auditory canals normal B/L, TM normal B/L  NOSE: NT 2+ and pink B/L, no stringing mucous, no polyps  ORAL: MMM, no ulcers, no thrush, no cobblestoning  NECK: supple, trachea midline, no thyromegaly, no LAD  LUNGS: CTAB, no w/r/c, no increased WOB  HEART: RRR, normal S1/S2, no m/g/r  EXTREMITIES: +2 distal pulses, mild edema  LYMPHATICS: no cervical/submandibular LAD  DERM: no rashes, no skin breaks, no dystrophic fingernails  NEURO: antalgic gait, no facial asymmetry     ASSESSMENT & PLAN     Juhi Taylor is a 68 y.o. female with     Chronic rhinosinusitis, not well-controlled. No polyps on exam today, but she does report hyposmia. Differential diagnosis includes obstruction of sinus drainage pathway, polyps, allergic fungal sinusitis, NARES, uncontrolled allergic rhinitis.   -CT scan of sinuses   -Recommend daily intranasal corticosteroid   -Region 6 panel to evaluate for allergic etiologies   -IgE to help provide insight into allergic fungal sinusitis    Dyspnea   -Spirometry ordered today    Follow up after CT scan, spirometry, and bloodwork have been done.   "

## 2018-11-19 LAB
A ALTERNATA IGE QN: <0.35 KU/L
A FUMIGATUS IGE QN: <0.35 KU/L
BERMUDA GRASS IGE QN: <0.35 KU/L
CAT DANDER IGE QN: <0.35 KU/L
CEDAR IGE QN: <0.35 KU/L
D FARINAE IGE QN: <0.35 KU/L
D PTERONYSS IGE QN: <0.35 KU/L
DEPRECATED A ALTERNATA IGE RAST QL: NORMAL
DEPRECATED A FUMIGATUS IGE RAST QL: NORMAL
DEPRECATED BERMUDA GRASS IGE RAST QL: NORMAL
DEPRECATED CAT DANDER IGE RAST QL: NORMAL
DEPRECATED CEDAR IGE RAST QL: NORMAL
DEPRECATED D FARINAE IGE RAST QL: NORMAL
DEPRECATED D PTERONYSS IGE RAST QL: NORMAL
DEPRECATED DOG DANDER IGE RAST QL: NORMAL
DEPRECATED ELDER IGE RAST QL: NORMAL
DEPRECATED ENGL PLANTAIN IGE RAST QL: NORMAL
DEPRECATED PECAN/HICK TREE IGE RAST QL: NORMAL
DEPRECATED ROACH IGE RAST QL: NORMAL
DEPRECATED TIMOTHY IGE RAST QL: NORMAL
DEPRECATED WEST RAGWEED IGE RAST QL: NORMAL
DEPRECATED WHITE OAK IGE RAST QL: NORMAL
DOG DANDER IGE QN: <0.35 KU/L
ELDER IGE QN: <0.35 KU/L
ENGL PLANTAIN IGE QN: <0.35 KU/L
PECAN/HICK TREE IGE QN: <0.35 KU/L
ROACH IGE QN: <0.35 KU/L
TIMOTHY IGE QN: <0.35 KU/L
WEST RAGWEED IGE QN: <0.35 KU/L
WHITE OAK IGE QN: <0.35 KU/L

## 2018-11-23 ENCOUNTER — HOSPITAL ENCOUNTER (OUTPATIENT)
Dept: RADIOLOGY | Facility: HOSPITAL | Age: 68
Discharge: HOME | End: 2018-11-23
Attending: ALLERGY & IMMUNOLOGY
Payer: MEDICARE

## 2018-11-23 ENCOUNTER — HOSPITAL ENCOUNTER (OUTPATIENT)
Dept: RESPIRATORY THERAPY | Facility: HOSPITAL | Age: 68
Discharge: HOME | End: 2018-11-23
Attending: ALLERGY & IMMUNOLOGY
Payer: MEDICARE

## 2018-11-23 DIAGNOSIS — R49.0 HOARSENESS OF VOICE: ICD-10-CM

## 2018-11-23 DIAGNOSIS — R06.00 DYSPNEA, UNSPECIFIED TYPE: ICD-10-CM

## 2018-11-23 DIAGNOSIS — J32.9 SINUSITIS, UNSPECIFIED CHRONICITY, UNSPECIFIED LOCATION: ICD-10-CM

## 2018-11-23 PROCEDURE — 94060 EVALUATION OF WHEEZING: CPT

## 2018-11-23 PROCEDURE — 70486 CT MAXILLOFACIAL W/O DYE: CPT | Mod: TC

## 2018-11-23 PROCEDURE — 70486 CT MAXILLOFACIAL W/O DYE: CPT | Mod: 26,,, | Performed by: RADIOLOGY

## 2018-11-23 PROCEDURE — 94727 GAS DIL/WSHOT DETER LNG VOL: CPT

## 2018-11-26 LAB
BRPFT: ABNORMAL
ERV LLN: 0.74
ERV REF: 0.74
ERVN2 LLN: 0.74
ERVN2 PRE REF: 20.9 %
ERVN2 PRE: 0.15 L (ref 0.74–0.74)
ERVN2 REF: 0.74
FEF 25 75 CHG: -1.9 %
FEF 25 75 LLN: 0.75
FEF 25 75 POST REF: 43.8 %
FEF 25 75 PRE REF: 44.6 %
FEF 25 75 REF: 1.89
FET100 CHG: 8.3 %
FEV1 CHG: -2.8 %
FEV1 FVC CHG: 1.5 %
FEV1 FVC LLN: 66
FEV1 FVC POST REF: 92.3 %
FEV1 FVC PRE REF: 90.9 %
FEV1 FVC REF: 79
FEV1 LLN: 1.56
FEV1 POST REF: 55.4 %
FEV1 PRE REF: 56.9 %
FEV1 REF: 2.19
FEV6 CHG: -2.4 %
FEV6 LLN: 1.97
FEV6 POST REF: 60.2 %
FEV6 POST: 1.65 L (ref 1.97–3.5)
FEV6 PRE REF: 61.7 %
FEV6 PRE: 1.69 L (ref 1.97–3.5)
FEV6 REF: 2.73
FRCN2 LLN: 2.05
FRCN2 PRE REF: 66.9 %
FRCN2 REF: 2.87
FRCPLETH LLN: 2.05
FRCPLETH REF: 2.87
FVC CHG: -4.2 %
FVC LLN: 2.02
FVC POST REF: 59.6 %
FVC PRE REF: 62.2 %
FVC REF: 2.81
MVV LLN: 84
MVV PRE REF: 43.6 %
MVV REF: 99
PEF CHG: 1.8 %
PEF LLN: 3.47
PEF POST REF: 65.4 %
PEF PRE REF: 64.3 %
PEF REF: 5.66
POST FEF 25 75: 0.83 L/S (ref 0.75–3.02)
POST FET 100: 8.01 SEC
POST FEV1 FVC: 72.56 % (ref 66.25–91.01)
POST FEV1: 1.21 L (ref 1.56–2.83)
POST FVC: 1.67 L (ref 2.02–3.6)
POST PEF: 3.7 L/S (ref 3.47–7.85)
PRE FEF 25 75: 0.84 L/S (ref 0.75–3.02)
PRE FET 100: 7.39 SEC
PRE FEV1 FVC: 71.49 % (ref 66.25–91.01)
PRE FEV1: 1.25 L (ref 1.56–2.83)
PRE FRC N2: 1.92 L
PRE FVC: 1.75 L (ref 2.02–3.6)
PRE MVV: 43 L/MIN (ref 83.81–113.39)
PRE PEF: 3.64 L/S (ref 3.47–7.85)
RAW LLN: 3.06
RAW REF: 3.06
RV LLN: 1.56
RV REF: 2.13
RVN2 LLN: 1.56
RVN2 PRE REF: 82.8 %
RVN2 PRE: 1.77 L (ref 1.56–2.71)
RVN2 REF: 2.13
RVN2TLCN2 LLN: 32
RVN2TLCN2 PRE REF: 117.5 %
RVN2TLCN2 PRE: 48.66 % (ref 31.81–50.99)
RVN2TLCN2 REF: 41
RVTLC LLN: 32
RVTLC REF: 41
TLC LLN: 4.44
TLC REF: 5.43
TLCN2 LLN: 4.44
TLCN2 PRE REF: 66.9 %
TLCN2 PRE: 3.63 L (ref 4.44–6.42)
TLCN2 REF: 5.43
VC LLN: 2.02
VC REF: 2.81
VCMAXN2 LLN: 2.02
VCMAXN2 PRE REF: 66.4 %
VCMAXN2 PRE: 1.86 L (ref 2.02–3.6)
VCMAXN2 REF: 2.81

## 2018-11-27 ENCOUNTER — TELEPHONE (OUTPATIENT)
Dept: ALLERGY | Facility: CLINIC | Age: 68
End: 2018-11-27

## 2018-11-27 DIAGNOSIS — J31.0 CHRONIC RHINITIS: Primary | ICD-10-CM

## 2018-11-27 RX ORDER — AZELASTINE 1 MG/ML
1 SPRAY, METERED NASAL 2 TIMES DAILY
Qty: 30 ML | Refills: 11 | Status: SHIPPED | OUTPATIENT
Start: 2018-11-27 | End: 2018-12-18 | Stop reason: SINTOL

## 2018-11-27 NOTE — TELEPHONE ENCOUNTER
Reviewed recent results:   Spirometry: restrictive pattern, no evidence of asthma.   Labs: Low IgE, no evidence of allergies.  Imaging: Sinus CT with chronic sinusitis.     Reviewed importance of getting an intranasal steroid in her nose every day.     Will add astelin.     At next visit, check humoral immune function.

## 2018-11-28 ENCOUNTER — OFFICE VISIT (OUTPATIENT)
Dept: ENDOCRINOLOGY | Facility: CLINIC | Age: 68
End: 2018-11-28
Payer: MEDICARE

## 2018-11-28 ENCOUNTER — HOSPITAL ENCOUNTER (EMERGENCY)
Facility: HOSPITAL | Age: 68
Discharge: HOME | End: 2018-11-28
Attending: EMERGENCY MEDICINE
Payer: MEDICARE

## 2018-11-28 VITALS
BODY MASS INDEX: 45.99 KG/M2 | WEIGHT: 293 LBS | HEART RATE: 61 BPM | DIASTOLIC BLOOD PRESSURE: 89 MMHG | HEIGHT: 67 IN | SYSTOLIC BLOOD PRESSURE: 196 MMHG | OXYGEN SATURATION: 95 % | RESPIRATION RATE: 18 BRPM | TEMPERATURE: 98 F

## 2018-11-28 VITALS
SYSTOLIC BLOOD PRESSURE: 170 MMHG | DIASTOLIC BLOOD PRESSURE: 120 MMHG | HEART RATE: 119 BPM | HEIGHT: 67 IN | TEMPERATURE: 98 F | RESPIRATION RATE: 18 BRPM | WEIGHT: 293 LBS | BODY MASS INDEX: 45.99 KG/M2

## 2018-11-28 DIAGNOSIS — E55.9 HYPOVITAMINOSIS D: ICD-10-CM

## 2018-11-28 DIAGNOSIS — I10 ESSENTIAL HYPERTENSION: ICD-10-CM

## 2018-11-28 DIAGNOSIS — E11.9 TYPE 2 DIABETES MELLITUS WITHOUT COMPLICATION, WITHOUT LONG-TERM CURRENT USE OF INSULIN: Primary | ICD-10-CM

## 2018-11-28 DIAGNOSIS — Z78.9 STATIN INTOLERANCE: ICD-10-CM

## 2018-11-28 DIAGNOSIS — E78.00 PURE HYPERCHOLESTEROLEMIA: ICD-10-CM

## 2018-11-28 DIAGNOSIS — I10 ASYMPTOMATIC HYPERTENSION: Primary | ICD-10-CM

## 2018-11-28 DIAGNOSIS — I10 HYPERTENSION: ICD-10-CM

## 2018-11-28 LAB
ALBUMIN SERPL BCP-MCNC: 3.6 G/DL
ALP SERPL-CCNC: 101 U/L
ALT SERPL W/O P-5'-P-CCNC: 20 U/L
ANION GAP SERPL CALC-SCNC: 7 MMOL/L
AST SERPL-CCNC: 18 U/L
BASOPHILS # BLD AUTO: 0.1 K/UL
BASOPHILS NFR BLD: 0.8 %
BILIRUB SERPL-MCNC: 0.4 MG/DL
BUN SERPL-MCNC: 23 MG/DL
CALCIUM SERPL-MCNC: 10.1 MG/DL
CHLORIDE SERPL-SCNC: 109 MMOL/L
CO2 SERPL-SCNC: 28 MMOL/L
CREAT SERPL-MCNC: 1 MG/DL
DIFFERENTIAL METHOD: ABNORMAL
EOSINOPHIL # BLD AUTO: 0.2 K/UL
EOSINOPHIL NFR BLD: 2.9 %
ERYTHROCYTE [DISTWIDTH] IN BLOOD BY AUTOMATED COUNT: 17 %
EST. GFR  (AFRICAN AMERICAN): >60 ML/MIN/1.73 M^2
EST. GFR  (NON AFRICAN AMERICAN): 59 ML/MIN/1.73 M^2
GLUCOSE SERPL-MCNC: 178 MG/DL
HCT VFR BLD AUTO: 41.4 %
HGB BLD-MCNC: 12.6 G/DL
LYMPHOCYTES # BLD AUTO: 2 K/UL
LYMPHOCYTES NFR BLD: 26.7 %
MCH RBC QN AUTO: 25.1 PG
MCHC RBC AUTO-ENTMCNC: 30.5 G/DL
MCV RBC AUTO: 82 FL
MONOCYTES # BLD AUTO: 0.5 K/UL
MONOCYTES NFR BLD: 6.9 %
NEUTROPHILS # BLD AUTO: 4.7 K/UL
NEUTROPHILS NFR BLD: 62.7 %
PLATELET # BLD AUTO: 329 K/UL
PMV BLD AUTO: 7.1 FL
POTASSIUM SERPL-SCNC: 4.8 MMOL/L
PROT SERPL-MCNC: 7.6 G/DL
RBC # BLD AUTO: 5.03 M/UL
SODIUM SERPL-SCNC: 144 MMOL/L
WBC # BLD AUTO: 7.6 K/UL

## 2018-11-28 PROCEDURE — 99283 EMERGENCY DEPT VISIT LOW MDM: CPT | Mod: 27

## 2018-11-28 PROCEDURE — 99213 OFFICE O/P EST LOW 20 MIN: CPT | Mod: PBBFAC,PO | Performed by: PHYSICIAN ASSISTANT

## 2018-11-28 PROCEDURE — 99999 PR PBB SHADOW E&M-EST. PATIENT-LVL III: CPT | Mod: PBBFAC,,, | Performed by: PHYSICIAN ASSISTANT

## 2018-11-28 PROCEDURE — 93005 ELECTROCARDIOGRAM TRACING: CPT

## 2018-11-28 PROCEDURE — 80053 COMPREHEN METABOLIC PANEL: CPT

## 2018-11-28 PROCEDURE — 85025 COMPLETE CBC W/AUTO DIFF WBC: CPT

## 2018-11-28 PROCEDURE — 99499 UNLISTED E&M SERVICE: CPT | Mod: S$PBB,,, | Performed by: PHYSICIAN ASSISTANT

## 2018-11-28 PROCEDURE — 36415 COLL VENOUS BLD VENIPUNCTURE: CPT

## 2018-11-28 PROCEDURE — 25000003 PHARM REV CODE 250: Performed by: EMERGENCY MEDICINE

## 2018-11-28 RX ORDER — GABAPENTIN 300 MG/1
300 CAPSULE ORAL NIGHTLY
COMMUNITY
End: 2020-11-30 | Stop reason: CLARIF

## 2018-11-28 RX ORDER — METFORMIN HYDROCHLORIDE 500 MG/1
1000 TABLET, EXTENDED RELEASE ORAL NIGHTLY
COMMUNITY
End: 2020-09-22 | Stop reason: SDUPTHER

## 2018-11-28 RX ORDER — LEVOTHYROXINE SODIUM 50 UG/1
50 TABLET ORAL
COMMUNITY
Start: 2018-10-30 | End: 2019-03-01

## 2018-11-28 RX ORDER — AMLODIPINE BESYLATE 10 MG/1
10 TABLET ORAL DAILY
Qty: 30 TABLET | Refills: 0 | Status: SHIPPED | OUTPATIENT
Start: 2018-11-28 | End: 2020-11-30

## 2018-11-28 RX ORDER — AMLODIPINE BESYLATE 5 MG/1
10 TABLET ORAL
Status: COMPLETED | OUTPATIENT
Start: 2018-11-28 | End: 2018-11-28

## 2018-11-28 RX ADMIN — AMLODIPINE BESYLATE 10 MG: 5 TABLET ORAL at 05:11

## 2018-11-28 NOTE — ED PROVIDER NOTES
"Encounter Date: 11/28/2018    SCRIBE #1 NOTE: I, Rudy Bojorquez, am scribing for, and in the presence of, Dr. Verma.       History     Chief Complaint   Patient presents with    Hypertension     Was at Endocrinologist for appt and was told to come to ED for elevated BP. (199/134)     Time seen by provider: 3:42 PM on 11/28/2018      Juhi Taylor is a 68 y.o. female with a PMHx of HTN, DM, CAD, and HLD who presents to the ED for further evaluation of HTN that after her BP being 199/134 at her endocrinologist today. She states that she suffers from HTN and was instructed to come to the ED by the endocrinologist. She states that she has had good compliance with her medications, but does admit that Hydralazine makes her feel "Funny" so she stopped. She also admits that she is having her typical chronic congestion. The patient denies fever, cough, chest pain, headache, SOB, abdominal pain, blurry vision, and myalgia. She has a PSHx of cardiac catheterization.       The history is provided by the patient.     Review of patient's allergies indicates:   Allergen Reactions    Amitiza [lubiprostone] Other (See Comments)     Does not remember    Linzess [linaclotide] Other (See Comments)     Does not remember     Past Medical History:   Diagnosis Date    Cancer     breast    Chronic sinusitis     Colitis     Coronary artery disease 2005    VERY MILD    Diabetes mellitus     Gout     Hyperlipemia     Hypertension     Hypertrophy of nasal turbinates     Multiple thyroid nodules     NICOLE (obstructive sleep apnea)     waiting on CPAP    Thyroid disease      Past Surgical History:   Procedure Laterality Date    BREAST SURGERY      Mastectomy with sentinal LN bx    CARDIAC CATHETERIZATION      CAUTERIZATION OF TURBINATES Bilateral 8/31/2017    [facility]    CHOLECYSTECTOMY      COLONOSCOPY      ESOPHAGOGASTRODUODENOSCOPY      HAND SURGERY Right     HYSTERECTOMY      " MASTECTOMY Bilateral     SINUS SURGERY FUNCTIONAL ENDOSCOPIC WITH NAVIGATION Bilateral 2017    [facility] OR    TUBAL LIGATION      TURBINATE FRACTURE  - INFERIOR NASAL Bilateral 2017    [facility] at [facility] OR     Family History   Problem Relation Age of Onset    Cancer Mother     Stroke Father     Hepatitis Brother     Asthma Daughter     Cancer Maternal Aunt      Social History     Tobacco Use    Smoking status: Former Smoker     Last attempt to quit: 2016     Years since quittin.9    Smokeless tobacco: Never Used    Tobacco comment: quit    Substance Use Topics    Alcohol use: No    Drug use: No     Review of Systems   Constitutional: Negative for fever.   HENT: Positive for congestion.    Eyes: Negative for visual disturbance.   Respiratory: Negative for shortness of breath.    Cardiovascular: Negative for chest pain.   Gastrointestinal: Negative for abdominal pain.   Genitourinary: Negative for dysuria.   Musculoskeletal: Negative for myalgias.   Skin: Negative for color change.   Neurological: Negative for headaches.       Physical Exam     Initial Vitals [18 1527]   BP Pulse Resp Temp SpO2   (!) 227/134 (!) 117 20 98.4 °F (36.9 °C) 97 %      MAP       --         Physical Exam    Nursing note and vitals reviewed.  Constitutional: She appears well-developed and well-nourished. She is not diaphoretic. No distress.   HENT:   Head: Normocephalic and atraumatic.   Eyes: EOM are normal. Pupils are equal, round, and reactive to light.   Neck: Normal range of motion. Neck supple.   Cardiovascular: Normal rate, regular rhythm, normal heart sounds and intact distal pulses. Exam reveals no gallop and no friction rub.    No murmur heard.  Pulmonary/Chest: Breath sounds normal. No respiratory distress. She has no wheezes. She has no rhonchi. She has no rales.   Abdominal: Soft. Bowel sounds are normal. There is no  tenderness. There is no rebound and no guarding.   Musculoskeletal: Normal range of motion.   Neurological: She is alert and oriented to person, place, and time.   Skin: Skin is warm and dry.   Psychiatric: She has a normal mood and affect. Her behavior is normal. Judgment and thought content normal.         ED Course   Procedures  Labs Reviewed   COMPREHENSIVE METABOLIC PANEL - Abnormal; Notable for the following components:       Result Value    Glucose 178 (*)     Anion Gap 7 (*)     eGFR if non  59 (*)     All other components within normal limits   CBC W/ AUTO DIFFERENTIAL - Abnormal; Notable for the following components:    MCH 25.1 (*)     MCHC 30.5 (*)     RDW 17.0 (*)     MPV 7.1 (*)     All other components within normal limits     EKG Readings: (Independently Interpreted)   NSR with a rate of 65 bpm with a left axis deviation. LVH is present with T wave invison in leads I and AVL no acute St segment changes       Imaging Results    None          Medical Decision Making:   History:   Old Medical Records: I decided to obtain old medical records.  Initial Assessment:   68-year-old female presented with a chief complaint of an elevated blood pressure.  Differential Diagnosis:   My differential diagnosis includes URI, hypertensive urgency, and hypertensive emergency  Clinical Tests:   Lab Tests: Ordered and Reviewed  Medical Tests: Ordered and Reviewed  ED Management:  The patient was emergently evaluated in the emergency department, her evaluation was significant for an elderly female with a normal neurologic exam.  The patient's EKG showed no acute abnormalities per my independent interpretation.  The patient's labs showed no acute abnormalities.  The patient's blood pressure did improve while in the emergency department.  The patient is asymptomatic from her elevated blood pressure.  Her diagnosis is asymptomatic hypertension.  I will add p.o. Norvasc to her medication regimen.  She was  started on the Norvasc here in the emergency department.  She is to otherwise follow up with her PCP for further care.  The patient is in agreement with this plan.            Scribe Attestation:   Scribe #1: I performed the above scribed service and the documentation accurately describes the services I performed. I attest to the accuracy of the note.        I, Dr. Ruperto Llanes, personally performed the services described in this documentation. All medical record entries made by the scribe were at my direction and in my presence.  I have reviewed the chart and agree that the record reflects my personal performance and is accurate and complete. Ruperto Llanes MD.  8:08 PM 11/28/2018          Clinical Impression:   The primary encounter diagnosis was Asymptomatic hypertension. A diagnosis of Hypertension was also pertinent to this visit.      Disposition:   Disposition: Discharged  Condition: Stable                        Ruperto Llanes MD  11/28/18 2008

## 2018-11-28 NOTE — PROGRESS NOTES
Patient reports to clinic for office visit. Patient was roomed with elevated /134 . Patient reports chest tightness and tingling feeling. These were new symptoms. Rapid response was called.. Abdullahi /120  by Saud Murphy LPN , Ms Angie advised call EMS.. EMS was called, patient refused ambulance and any treatment. She left AMA to drive herself to hospital. She was escorted to car with staff asked again if she would like a ambulance. Patient declined again

## 2018-11-28 NOTE — PROGRESS NOTES
CC: Thyroid nodule    HPI: Juhi Taylor is a 68 y.o. female here for thyroid nodule along with other conditions listed in the Visit Diagnosis.     Pt sent to ER due to elevated BP and chest discomfort she had never felt before. Stated she felt numbness and tingling in her arms. No dizziness, lightheadedness or nausea. Pt declined ambulance. Reports taking all of her blood pressure medication today and yesterday as prescribed.

## 2018-11-28 NOTE — ED NOTES
Pt in room 8 for evaluation of hypertension.  Pt is awake, alert and oriented. Resp even and unlabored. Abd soft and non-tender.  Pt denies chest pain or sob.

## 2018-11-30 ENCOUNTER — LAB VISIT (OUTPATIENT)
Dept: LAB | Facility: HOSPITAL | Age: 68
End: 2018-11-30
Attending: PHYSICIAN ASSISTANT
Payer: MEDICARE

## 2018-11-30 ENCOUNTER — OFFICE VISIT (OUTPATIENT)
Dept: ENDOCRINOLOGY | Facility: CLINIC | Age: 68
End: 2018-11-30
Payer: MEDICARE

## 2018-11-30 VITALS
SYSTOLIC BLOOD PRESSURE: 141 MMHG | BODY MASS INDEX: 45.99 KG/M2 | RESPIRATION RATE: 18 BRPM | HEIGHT: 67 IN | DIASTOLIC BLOOD PRESSURE: 67 MMHG | WEIGHT: 293 LBS | HEART RATE: 72 BPM | TEMPERATURE: 98 F

## 2018-11-30 DIAGNOSIS — E03.9 HYPOTHYROIDISM, UNSPECIFIED TYPE: ICD-10-CM

## 2018-11-30 DIAGNOSIS — E55.9 HYPOVITAMINOSIS D: ICD-10-CM

## 2018-11-30 DIAGNOSIS — I10 MALIGNANT HYPERTENSION: ICD-10-CM

## 2018-11-30 DIAGNOSIS — G47.33 OSA (OBSTRUCTIVE SLEEP APNEA): ICD-10-CM

## 2018-11-30 DIAGNOSIS — E78.00 PURE HYPERCHOLESTEROLEMIA: ICD-10-CM

## 2018-11-30 DIAGNOSIS — E11.9 TYPE 2 DIABETES MELLITUS WITHOUT COMPLICATION, WITHOUT LONG-TERM CURRENT USE OF INSULIN: ICD-10-CM

## 2018-11-30 DIAGNOSIS — E04.1 THYROID NODULE: Primary | ICD-10-CM

## 2018-11-30 LAB
25(OH)D3+25(OH)D2 SERPL-MCNC: 24 NG/ML
CA-I BLDV-SCNC: 1.33 MMOL/L
CHOLEST SERPL-MCNC: 201 MG/DL
CHOLEST/HDLC SERPL: 4.2 {RATIO}
ESTIMATED AVG GLUCOSE: 120 MG/DL
HBA1C MFR BLD HPLC: 5.8 %
HDLC SERPL-MCNC: 48 MG/DL
HDLC SERPL: 23.9 %
LDLC SERPL CALC-MCNC: 129.4 MG/DL
NONHDLC SERPL-MCNC: 153 MG/DL
PTH-INTACT SERPL-MCNC: 198 PG/ML
T3 SERPL-MCNC: 81 NG/DL
TRIGL SERPL-MCNC: 118 MG/DL

## 2018-11-30 PROCEDURE — 82330 ASSAY OF CALCIUM: CPT

## 2018-11-30 PROCEDURE — 84378 SUGARS SINGLE QUANT: CPT

## 2018-11-30 PROCEDURE — 99215 OFFICE O/P EST HI 40 MIN: CPT | Mod: PBBFAC,PO | Performed by: PHYSICIAN ASSISTANT

## 2018-11-30 PROCEDURE — 82306 VITAMIN D 25 HYDROXY: CPT

## 2018-11-30 PROCEDURE — 84480 ASSAY TRIIODOTHYRONINE (T3): CPT

## 2018-11-30 PROCEDURE — 99214 OFFICE O/P EST MOD 30 MIN: CPT | Mod: S$PBB,,, | Performed by: PHYSICIAN ASSISTANT

## 2018-11-30 PROCEDURE — 82985 ASSAY OF GLYCATED PROTEIN: CPT

## 2018-11-30 PROCEDURE — 99999 PR PBB SHADOW E&M-EST. PATIENT-LVL V: CPT | Mod: PBBFAC,,, | Performed by: PHYSICIAN ASSISTANT

## 2018-11-30 PROCEDURE — 83970 ASSAY OF PARATHORMONE: CPT

## 2018-11-30 PROCEDURE — 80061 LIPID PANEL: CPT

## 2018-11-30 PROCEDURE — 83036 HEMOGLOBIN GLYCOSYLATED A1C: CPT | Mod: 59

## 2018-11-30 PROCEDURE — 82088 ASSAY OF ALDOSTERONE: CPT

## 2018-11-30 RX ORDER — SPIRONOLACTONE 50 MG/1
50 TABLET, FILM COATED ORAL DAILY
Qty: 90 TABLET | Refills: 3 | Status: SHIPPED | OUTPATIENT
Start: 2018-11-30 | End: 2019-03-01

## 2018-11-30 NOTE — PROGRESS NOTES
"CC: DM/thyroid nodule/hypothyroidism    HPI: Juhi Taylor was diagnosed with Type 2 DM ~15 years ago. No hospitalizations for DM. Her daughter has DM.  No fhx of thyroid disease.     New to endocrine today.    CURRENT DIABETIC MEDS: glipizide 5 mg BID, metformin 1000 mg daily ( was stopped this summer before prior a1c).     Does not check BG. Hypoglycemia: No. She will drink apple juice if she has one.   Type of Glucose Meter: one touch verio    Physical Activity: Aquatic therapy at [facility].     Dietary Habits: 1-2 meal daily. She eats a lot of rice. Doesn't snack. Drinks water, drinks a cold drink every now and then.     Last Eye Exam: optometry 6 months ago but they did not dilate her eyes  Last Podiatry Exam: n/a    Last DM Education Attended: Last year    Thyroid nodule  5-10 years ago  Bx in 2015  Of left thyroid nodule was benign  Right sided nodule has grown in size and is 1.9 cm  No new voice changes. + dysphagia and SOB.     Hypothyroidism  10 years  LT4 50 mcg qd  + sweating, constipation, hair loss (hereditary)  No h/c intolerance, palpitations, insomnia    NICOLE-wears cpap    Social Hx: smoked 1 ppd for 40 years. She quit 2 years ago.  No ETOH use.     REVIEW OF SYSTEMS  General: no weakness or fatigue.   Eyes: no intermittent blurry vision or visual disturbances.   Cardiac: no chest pain or palpiatiaons.   Respiratory: no cough or dyspnea.   GI: no abdominal pain or nausea.   Skin: no rashes or itching.   Neuro: no numbness or tingling.   Endocrine: no polyuria, polydipsia, polyphagia.   Remainder ROS negative    Vital Signs  BP (!) 141/67 (BP Location: Right arm, Patient Position: Sitting, BP Method: Medium (Automatic))   Pulse 72   Temp 97.9 °F (36.6 °C) (Oral)   Resp 18   Ht 5' 7" (1.702 m)   Wt (!) 146.5 kg (322 lb 15.6 oz)   BMI 50.58 kg/m²     Personally reviewed labs below:     Hemoglobin A1C   Date Value Ref Range Status   08/31/2017 6.7 (H) 0.0 - 5.6 % Final     Comment:     " Reference Interval:  5.0 - 5.6 Normal   5.7 - 6.4 High Risk   > 6.5 Diabetic    Hgb A1c results are standardized based on the (NGSP) National   Glycohemoglobin Standardization Program.    Hemoglobin A1C levels are related to mean serum/plasma glucose   during the preceding 2-3 months.        01/06/2010 5.9 4.0 - 6.2 % Final   09/04/2009 6.3 (H) 4.0 - 6.2 % Final       Chemistry        Component Value Date/Time     11/28/2018 1559    K 4.8 11/28/2018 1559     11/28/2018 1559    CO2 28 11/28/2018 1559    BUN 23 11/28/2018 1559    CREATININE 1.0 11/28/2018 1559     (H) 11/28/2018 1559        Component Value Date/Time    CALCIUM 10.1 11/28/2018 1559    ALKPHOS 101 11/28/2018 1559    AST 18 11/28/2018 1559    ALT 20 11/28/2018 1559    BILITOT 0.4 11/28/2018 1559    ESTGFRAFRICA >60 11/28/2018 1559    EGFRNONAA 59 (A) 11/28/2018 1559          Lab Results   Component Value Date    CHOL 172 08/31/2018    CHOL 181 01/06/2010    CHOL 162 09/04/2009     Lab Results   Component Value Date    HDL 38 (L) 08/31/2018    HDL 38 (L) 01/06/2010    HDL 42 09/04/2009     Lab Results   Component Value Date    LDLCALC 112.2 08/31/2018    LDLCALC 122.4 01/06/2010    LDLCALC 100.8 09/04/2009     Lab Results   Component Value Date    TRIG 109 08/31/2018    TRIG 103 01/06/2010    TRIG 96 09/04/2009     Lab Results   Component Value Date    CHOLHDL 22.1 08/31/2018    CHOLHDL 21.0 01/06/2010    CHOLHDL 25.9 09/04/2009       Lab Results   Component Value Date    TSH 1.16 01/26/2010     2016 u/s      2018 below        No results found for: MICALBCREAT    No results found for: [id number]    PHYSICAL EXAMINATION  Constitutional: middle aged female, appears well, no distress  Neck: Supple, trachea midline.   Respiratory: even and unlabored, CTA without wheezes.  Cardiovascular: RRR; no carotid bruits or murmurs.   Lymph: DP pulses  2+ bilaterally; 1+ edema.   Skin: warm and dry; no acanthosis nigracans observed.  Neuro: patient  alert and cooperative; CN 2-12 grossly intact  Feet: monofilament sensation intact bilaterally  + calluses, otherwise nails and skin in good condition.    Assessment/Plan    1. Type 2 diabetes mellitus without complication, without long-term current use of insulin  Microalbumin/creatinine urine ratio    T3    GlycoMark (TM)    Fructosamine    PTH, intact    Calcium, ionized    CANCELED: T4, free    CANCELED: TSH    CANCELED: Hemoglobin A1c    CANCELED: Lipid panel   2. Malignant hypertension     3. Pure hypercholesterolemia     4. Hypovitaminosis D  Vitamin D      Thyroid nodule-FNA of 1.9 cm nodule in the right lobe  Hypothyroidism-continue LT4 dose. TFTs today  H1YN-Lbmckukvwzxy today. Continue current regimen. BG checks 1x every other day.Hypoglycemia and diabetic foot care discussed.  HTN-elevated-Increase spironolactone to 50 mg daily. Continue ARB  HLD-statin intolerant. Start praulent 300 mg monthly  Hypovitaminosis D- check vd  NICOLE-continue CPAP    FOLLOW UP  3 mths

## 2018-12-02 PROBLEM — E04.1 THYROID NODULE: Status: ACTIVE | Noted: 2018-12-02

## 2018-12-02 PROBLEM — E55.9 HYPOVITAMINOSIS D: Status: ACTIVE | Noted: 2018-12-02

## 2018-12-03 ENCOUNTER — TELEPHONE (OUTPATIENT)
Dept: PHARMACY | Facility: CLINIC | Age: 68
End: 2018-12-03

## 2018-12-03 LAB
ALDOST SERPL-MCNC: 13.9 NG/DL
ALDOST/RENIN PLAS-RTO: 34.8 RATIO
FRUCTOSAMINE SERPL-SCNC: 238 UMOL /L
RENIN PLAS-CCNC: 0.4 NG/ML/HR

## 2018-12-04 ENCOUNTER — TELEPHONE (OUTPATIENT)
Dept: ENDOCRINOLOGY | Facility: CLINIC | Age: 68
End: 2018-12-04

## 2018-12-04 DIAGNOSIS — E55.9 HYPOVITAMINOSIS D: ICD-10-CM

## 2018-12-04 DIAGNOSIS — Z78.0 POSTMENOPAUSAL: Primary | ICD-10-CM

## 2018-12-04 NOTE — TELEPHONE ENCOUNTER
Patient would like the results from her other labs. States that she saw them on [facility] and she is worried about the elevated PTH. Please advise.

## 2018-12-05 LAB — GLYCOMARK (TM): 24.6 UG/ML

## 2018-12-05 NOTE — TELEPHONE ENCOUNTER
Documentation Only:    Faxed Prior Authorization form and supporting documentation for Praluent to insurance on 12/05/2018.

## 2018-12-06 ENCOUNTER — HOSPITAL ENCOUNTER (OUTPATIENT)
Dept: RADIOLOGY | Facility: HOSPITAL | Age: 68
Discharge: HOME | End: 2018-12-06
Attending: PHYSICIAN ASSISTANT
Payer: MEDICARE

## 2018-12-06 DIAGNOSIS — E04.1 THYROID NODULE: ICD-10-CM

## 2018-12-06 PROCEDURE — 76536 US EXAM OF HEAD AND NECK: CPT | Mod: TC

## 2018-12-06 PROCEDURE — 76536 US EXAM OF HEAD AND NECK: CPT | Mod: 26,,, | Performed by: RADIOLOGY

## 2018-12-11 NOTE — TELEPHONE ENCOUNTER
Documentation Only:    Prior Authorization for Praluent has been approved for 6 months.    Approval dates:  12/07/2018 through 06/07/2019    Patient co-pay:  $3.70

## 2018-12-14 NOTE — TELEPHONE ENCOUNTER
Praluent 300mg consultation, injection training, shipment scheduled for 12/17/18 at 9am.  Patient did not feel injection video and instructions was necessary.  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  Plans on asking niece who is a RN to help inject.  $3.70 copay in 004.

## 2018-12-17 NOTE — TELEPHONE ENCOUNTER
Initial Praluent 300mg (2 x 150mg/mL PENs) consult completed on 18 . Praluent will be shipped on 18 to arrive at patient's home on 18 via [employer]. $3.70 copay. Patient will start Praluent on 19. Address confirmed. Confirmed 2 patient identifiers - name and . Therapy Appropriate.  --Injection experience: None.  Patient declined learning materials (injection video and instructions for use) via [employer].  Step by step verbal injection instructions reviewed.  Plans on asking niece who is a RN to help inject.      Counseled patient on administration directions:  - Inject 300mg into the skin every 14 days.   - Take out of the refrigerator 30-60 minutes prior to injection.  - Wash hands before and after injection.  - Monthly RX will come with gauze, bandaids, and alcohol swabs.  - Patient may inject in either the tops of the thighs, abdomen- but at least 2 inches away from her belly button, or the outer part of her upper arm.  Patient was instructed to rotate injections sites.  - Patient is to wipe down the injection site with the alcohol pad, wait to dry.  Gently squeeze the area of the cleaned skin and hold it firmly.   Place the pen flat against the raised area of skin that is being squeezed, then push down on the button and release,  in 10-15 seconds you will hear a click and the window will go from from clear to yellow, indicating injection is complete.  - Patient should rotate injection sites.   - Patient will use sharps container; once full, per [place] law, she/ he may lock the sharps container and place in her trash. She/ he can then contact the Pharmacy and we will replace the sharps at no additional charge.    Patient was counseled on possible side effects:  - Injection site reaction: redness, soreness, itching, bruising, which should resolve within 3-5 days.  - flu-like symptoms    This medication may affect blood sugar levels.  Advised patient to keep track of BG and report any trending  increased to BG to [facility] or provider.    DDIs:  Medication list and allergies reviewed.  No DDIs.    Lifestyle modification - diet and exercise reviewed.  Patient goes to water therapy to exercise.  She cannot lift heavy objects and limits stairs.  Reports hand and knees hurt due to arthritis and pinch nerve.  Low energy.    Patient did not have any further questions. She was advised to keep a calendar to stay compliant. Patient will start Praluent on 1/1/19. Consultation included: indication; goals of treatment; administration; storage and handling; side effects; how to handle side effects; the importance of compliance; how to handle missed doses; the importance of laboratory monitoring; the importance of keeping all follow up appointments.  Patient understands to report any medication changes to [facility] and provider. All questions answered and addressed to patients satisfaction. I will f/u with her in 1 week from start, [facility] to contact patient in 3 weeks for refills.

## 2018-12-18 ENCOUNTER — OFFICE VISIT (OUTPATIENT)
Dept: ALLERGY | Facility: CLINIC | Age: 68
End: 2018-12-18
Payer: MEDICARE

## 2018-12-18 ENCOUNTER — LAB VISIT (OUTPATIENT)
Dept: LAB | Facility: HOSPITAL | Age: 68
End: 2018-12-18
Attending: PHYSICIAN ASSISTANT
Payer: MEDICARE

## 2018-12-18 VITALS — HEIGHT: 67 IN | BODY MASS INDEX: 45.99 KG/M2 | HEART RATE: 74 BPM | WEIGHT: 293 LBS | OXYGEN SATURATION: 95 %

## 2018-12-18 DIAGNOSIS — J32.9 FREQUENT SINUS INFECTIONS: ICD-10-CM

## 2018-12-18 DIAGNOSIS — J32.9 SINUSITIS, UNSPECIFIED CHRONICITY, UNSPECIFIED LOCATION: ICD-10-CM

## 2018-12-18 DIAGNOSIS — J31.0 CHRONIC RHINITIS: Primary | ICD-10-CM

## 2018-12-18 DIAGNOSIS — E03.9 HYPOTHYROIDISM, UNSPECIFIED TYPE: ICD-10-CM

## 2018-12-18 DIAGNOSIS — R49.0 HOARSENESS OF VOICE: ICD-10-CM

## 2018-12-18 DIAGNOSIS — Z78.0 POSTMENOPAUSAL: ICD-10-CM

## 2018-12-18 DIAGNOSIS — E55.9 HYPOVITAMINOSIS D: ICD-10-CM

## 2018-12-18 DIAGNOSIS — R06.00 DYSPNEA, UNSPECIFIED TYPE: ICD-10-CM

## 2018-12-18 DIAGNOSIS — J31.0 CHRONIC RHINITIS: ICD-10-CM

## 2018-12-18 LAB
25(OH)D3+25(OH)D2 SERPL-MCNC: 20 NG/ML
ANION GAP SERPL CALC-SCNC: 11 MMOL/L
BUN SERPL-MCNC: 23 MG/DL
CA-I BLDV-SCNC: 1.41 MMOL/L
CALCIUM SERPL-MCNC: 10.3 MG/DL
CHLORIDE SERPL-SCNC: 108 MMOL/L
CO2 SERPL-SCNC: 24 MMOL/L
CREAT SERPL-MCNC: 0.8 MG/DL
EST. GFR  (AFRICAN AMERICAN): >60 ML/MIN/1.73 M^2
EST. GFR  (NON AFRICAN AMERICAN): >60 ML/MIN/1.73 M^2
GLUCOSE SERPL-MCNC: 133 MG/DL
IGA SERPL-MCNC: 192 MG/DL
IGG SERPL-MCNC: 948 MG/DL
IGM SERPL-MCNC: 58 MG/DL
POTASSIUM SERPL-SCNC: 4 MMOL/L
PTH-INTACT SERPL-MCNC: 125 PG/ML
SODIUM SERPL-SCNC: 143 MMOL/L
T4 FREE SERPL-MCNC: 0.96 NG/DL
TSH SERPL DL<=0.005 MIU/L-ACNC: 0.76 UIU/ML

## 2018-12-18 PROCEDURE — 86774 TETANUS ANTIBODY: CPT

## 2018-12-18 PROCEDURE — 84443 ASSAY THYROID STIM HORMONE: CPT

## 2018-12-18 PROCEDURE — 99999 PR PBB SHADOW E&M-EST. PATIENT-LVL III: CPT | Mod: PBBFAC,,, | Performed by: ALLERGY & IMMUNOLOGY

## 2018-12-18 PROCEDURE — 82784 ASSAY IGA/IGD/IGG/IGM EACH: CPT | Mod: 59

## 2018-12-18 PROCEDURE — 82306 VITAMIN D 25 HYDROXY: CPT

## 2018-12-18 PROCEDURE — 86003 ALLG SPEC IGE CRUDE XTRC EA: CPT | Mod: 59

## 2018-12-18 PROCEDURE — 36415 COLL VENOUS BLD VENIPUNCTURE: CPT | Mod: PO

## 2018-12-18 PROCEDURE — 80048 BASIC METABOLIC PNL TOTAL CA: CPT

## 2018-12-18 PROCEDURE — 82784 ASSAY IGA/IGD/IGG/IGM EACH: CPT

## 2018-12-18 PROCEDURE — 99213 OFFICE O/P EST LOW 20 MIN: CPT | Mod: S$PBB,,, | Performed by: ALLERGY & IMMUNOLOGY

## 2018-12-18 PROCEDURE — 82330 ASSAY OF CALCIUM: CPT

## 2018-12-18 PROCEDURE — 84439 ASSAY OF FREE THYROXINE: CPT

## 2018-12-18 PROCEDURE — 99213 OFFICE O/P EST LOW 20 MIN: CPT | Mod: PBBFAC,PO | Performed by: ALLERGY & IMMUNOLOGY

## 2018-12-18 PROCEDURE — 83970 ASSAY OF PARATHORMONE: CPT

## 2018-12-18 PROCEDURE — 86003 ALLG SPEC IGE CRUDE XTRC EA: CPT

## 2018-12-18 NOTE — PROGRESS NOTES
ALLERGY & IMMUNOLOGY CLINIC - FOLLOW UP     HISTORY OF PRESENT ILLNESS     Patient ID: Juhi Taylor is a 68 y.o. female    CC: follow up    HPI: 69 yo woman with chronic rhinitis and dyspnea presents for routine follow up. Last seen by me one month ago for chronic rhinosinusitis. In the interim, we have obtained the following results:   Sinus CT with chronic sinusitis and inspissated/polypoid material in right maxillary sinus  Immunocaps are all negative  Spirometry with restrictive pattern, no obstruction, no response to bronchodilator.     She picked up azelastine, used it one time, developed racing heart and palpitations. Won't use it again.  Still using flonase 2 SEN once daily, very rarely forgets to take. Sometimes she develops blood in her nose or burning, at which time she takes oxymetazoline nasal spray.     Sleeps with CPAP, she is concerned that the CPAP is contributing to infections.    Underwent chemotherapy in 2015 - cytoxan, tamoxifen.    Infectious history:  No pneumonia  Left sided-ear pain  Frequent/chronic sinus infections  No UTIs  +yeast infections with antibiotics  No meningitis  No significant skin infections     REVIEW OF SYSTEMS     CONST: no F/C/NS, no unintentional weight changes  NEURO: + H/A, no weakness, no paresthesias  EYES: no discharge, no pruritus, no erythema  EARS: no hearing loss, no sensation of fullness  NOSE: + congestion, + rhinorrhea, + discharge, no itching, no sneezing  PULM: no SOB, ? wheezing, occasional cough  CV: no CP, no palpitations, no leg swelling  GI: no dysphagia, no heartburn, no pain, no N/V/D, no BRBPR/melena  MSK: + joint pain, no muscle pain  DERM: no rashes, no skin breaks     MEDICAL HISTORY     MedHx: active problems reviewed  SurgHx: sinus surgery August 2017  SocHx: former smoker  FamHx: daughter with asthma  Allergies: Linzess and Amitiza - one causes nausea, the other palpitations, btu she can't remember which is which  Medications: MAR  "reviewed     H/o Asthma: denies  H/o Eczema: denies  H/o Rhinitis: see HPI  Oral Allergy:  denies  Food Allergy: denies  Venom Allergy: denies  Latex Allergy: denies  Other Allergies: denies  Env/Occ: denies any evironmental or occupational exposures        PHYSICAL EXAM     VS: Pulse 74   Ht 5' 7" (1.702 m)   Wt (!) 149.1 kg (328 lb 11.3 oz)   SpO2 95%   BMI 51.48 kg/m²   GENERAL: AAOx4, NAD, well-appearing, cooperative  EYES: PERRL, EOMI, no conjunctival injection, no discharge, + infraorbital shiners  EARS: external auditory canals normal B/L, TM normal B/L  NOSE: NT 2+ and pink B/L, no stringing mucous, no polyps  ORAL: MMM, no ulcers, no thrush, no cobblestoning  NECK: supple, trachea midline, no thyromegaly, no LAD  LUNGS: CTAB, no w/r/c, no increased WOB  HEART: RRR, normal S1/S2, no m/g/r  EXTREMITIES: +2 distal pulses, mild edema  LYMPHATICS: no cervical/submandibular LAD  DERM: no rashes, no skin breaks, no dystrophic fingernails  NEURO: antalgic gait, no facial asymmetry     ALLERGEN TESTING     Immunocaps: 2018: all negative     PULMONARY FUNCTION     PFTs: restrictive pattern     IMAGING & OTHER DIAGNOSTICS     CT sinus with inspissated mucous and polypoid material in R maxillary sinus     ASSESSMENT & PLAN     Juhi Taylor is a 68 y.o. female with     Chronic rhinosinusitis, not well-controlled with negative immunocaps and with possible polyps on CT scan. Differential diagnosis includes polyps, allergic fungal sinusitis, fungal ball, NARES, vasomotor rhinitis.              -Continue daily intranasal corticosteroid              -IgE to molds              -Initiate humoral immunodeficiency workup     Dyspnea              -Restrictive pattern on spirometry, discussed how weight can contribute to this   -CT chest if symptoms persist    Phone review when labs result.    "

## 2018-12-20 LAB — T RUBRUM IGE QN: <0.35 KU/L

## 2018-12-21 LAB
ALLERGEN ACREMONIUM (CEPHALOSPORIUM) CLASS: NORMAL
ALLERGEN ACREMONIUM (CEPHALOSPORIUM) IGE: <0.35 KU/L
ALLERGEN CHAETOMIUM GLOBOSUM IGE: <0.35 KU/L
B CINEREA IGE QN: <0.35 KU/L
C ALBICANS IGE QN: <0.35 KU/L
C LUNATA IGE QN: <0.35 KU/L
CHAETOMIUM GLOB. CLASS: NORMAL
DEPRECATED B CINEREA IGE RAST QL: NORMAL
DEPRECATED C ALBICANS IGE RAST QL: NORMAL
DEPRECATED C LUNATA IGE RAST QL: NORMAL
DEPRECATED P NOTATUM IGE RAST QL: NORMAL
DEPRECATED R NIGRICANS IGE RAST QL: NORMAL
P NOTATUM IGE QN: <0.35 KU/L
R NIGRICANS IGE QN: <0.35 KU/L
STEMPHYLIUM HERBARUM CLASS: NORMAL
STEMPHYLLIUM, IGE: <0.35 KU/L

## 2018-12-27 NOTE — TELEPHONE ENCOUNTER
Patient has a follow up appointment on 3-1-19. Wants to do lab work a few days ahead of time. May we have orders?

## 2018-12-28 DIAGNOSIS — E03.9 HYPOTHYROIDISM, UNSPECIFIED TYPE: ICD-10-CM

## 2018-12-28 DIAGNOSIS — E55.9 HYPOVITAMINOSIS D: ICD-10-CM

## 2018-12-28 DIAGNOSIS — E21.3 HYPERPARATHYROIDISM: ICD-10-CM

## 2018-12-28 DIAGNOSIS — E11.9 TYPE 2 DIABETES MELLITUS WITHOUT COMPLICATION, WITHOUT LONG-TERM CURRENT USE OF INSULIN: Primary | ICD-10-CM

## 2018-12-28 DIAGNOSIS — Z78.0 POSTMENOPAUSAL: Primary | ICD-10-CM

## 2019-01-03 ENCOUNTER — OFFICE VISIT (OUTPATIENT)
Dept: OPTOMETRY | Facility: CLINIC | Age: 69
End: 2019-01-03
Payer: MEDICARE

## 2019-01-03 ENCOUNTER — PATIENT MESSAGE (OUTPATIENT)
Dept: ALLERGY | Facility: CLINIC | Age: 69
End: 2019-01-03

## 2019-01-03 DIAGNOSIS — H52.7 REFRACTIVE ERROR: ICD-10-CM

## 2019-01-03 DIAGNOSIS — H40.013 OPEN ANGLE WITH BORDERLINE FINDINGS OF BOTH EYES: ICD-10-CM

## 2019-01-03 DIAGNOSIS — E11.9 DIABETES MELLITUS WITHOUT OPHTHALMIC MANIFESTATIONS: Primary | ICD-10-CM

## 2019-01-03 DIAGNOSIS — H25.13 NUCLEAR SCLEROSIS, BILATERAL: ICD-10-CM

## 2019-01-03 PROCEDURE — 92004 PR EYE EXAM, NEW PATIENT,COMPREHESV: ICD-10-PCS | Mod: S$PBB,,, | Performed by: OPTOMETRIST

## 2019-01-03 PROCEDURE — 92250 FUNDUS PHOTOGRAPHY W/I&R: CPT | Mod: PBBFAC,PO | Performed by: OPTOMETRIST

## 2019-01-03 PROCEDURE — 92250 COLOR FUNDUS PHOTOGRAPHY - OU - BOTH EYES: ICD-10-PCS | Mod: 26,S$PBB,, | Performed by: OPTOMETRIST

## 2019-01-03 PROCEDURE — 99999 PR PBB SHADOW E&M-EST. PATIENT-LVL II: CPT | Mod: PBBFAC,,, | Performed by: OPTOMETRIST

## 2019-01-03 PROCEDURE — 99999 PR PBB SHADOW E&M-EST. PATIENT-LVL II: ICD-10-PCS | Mod: PBBFAC,,, | Performed by: OPTOMETRIST

## 2019-01-03 PROCEDURE — 92004 COMPRE OPH EXAM NEW PT 1/>: CPT | Mod: S$PBB,,, | Performed by: OPTOMETRIST

## 2019-01-03 PROCEDURE — 99212 OFFICE O/P EST SF 10 MIN: CPT | Mod: PBBFAC,PO | Performed by: OPTOMETRIST

## 2019-01-03 RX ORDER — DICLOFENAC SODIUM 10 MG/G
GEL TOPICAL
COMMUNITY
Start: 2018-12-17 | End: 2019-12-16

## 2019-01-03 NOTE — PROGRESS NOTES
HPI     Presenting Complaint: Pt here today for yearly diabetic eye exam.    Lab Results       Component                Value               Date                       HGBA1C                   5.8 (H)             11/30/2018                 HGBA1C                   6.7 (H)             08/31/2017                 HGBA1C                   5.9                 01/06/2010              Pt got new rx for glasses 4 months ago at [facility]. Pt states vision   has been stable with glasses. Pt has noticed glare from light at night are   blinding.     Ophthalmic medication / drops: Art Tears as needed for dry eyes    (-) headaches  (-) diplopia   (-) flashes / (+) hx of floaters right eye      Last edited by Juve De La Fuente, OD on 1/3/2019 10:11 AM. (History)            Assessment /Plan     For exam results, see Encounter Report.    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 Discussed possible ocular affects of uncontrolled blood sugar with patient. Recommended continued strong blood sugar control and continued care with PCP. Monitor yearly.     Glaucoma suspect secondary to larger than average CD ratio OU. Discussed with patient. Patient scheduled to RTC for HVF 24-2 HANNY FAST, pachs, IOP check.   (+) fam hx: sister  (+) DM  (+) HTN  (+) sleep apnea    IOP 01/03/19:  OD 14 mmHg, OS 14 mmHg    Photos 01/03/19    Mild NS OU, not visually significant. Discussed possible ocular affects of cataracts. Acceptable BCVA OU. Discussed treatment options. Surgery not recommended at this time. Monitor yearly.     Pt recently updated specs through [facility]. Recommend getting anti-glare coating on lenses to help with nighttime glare. Discussed not 100% correctable secondary to cataracts. Pt reports good understanding.      RTC as scheduled for glc eval.

## 2019-01-03 NOTE — LETTER
January 3, 2019      DEEJAY Adams PA-C  [address]  [place] [address]           [facility] - Optometry  [address] [place] [address] Drive [address]  [facility] [address]  Phone: [phone]          Patient: Juhi Taylor   MR Number: [id number]   YOB: 1950   Date of Visit: 1/3/2019       Dear DEEJAY Adams:    Thank you for referring Juhi Taylor to me for evaluation. Attached you will find relevant portions of my assessment and plan of care.    If you have questions, please do not hesitate to call me. I look forward to following Juhi Taylor along with you.    Sincerely,    Juve De La Fuente, OD    Enclosure  CC:  No Recipients    If you would like to receive this communication electronically, please contact [email or web address] or [phone] to request more information on [employer] access.    For providers and/or their staff who would like to refer a patient to [facility], please contact us through our one-stop-shop provider referral line, [facility], at [phone].    If you feel you have received this communication in error or would no longer like to receive these types of communications, please e-mail [email or web address]

## 2019-01-07 ENCOUNTER — CLINICAL SUPPORT (OUTPATIENT)
Dept: OPHTHALMOLOGY | Facility: CLINIC | Age: 69
End: 2019-01-07
Payer: MEDICARE

## 2019-01-07 ENCOUNTER — OFFICE VISIT (OUTPATIENT)
Dept: OPTOMETRY | Facility: CLINIC | Age: 69
End: 2019-01-07
Payer: MEDICARE

## 2019-01-07 DIAGNOSIS — H40.013 OPEN ANGLE WITH BORDERLINE FINDINGS OF BOTH EYES: Primary | ICD-10-CM

## 2019-01-07 DIAGNOSIS — H40.013 OPEN ANGLE WITH BORDERLINE FINDINGS OF BOTH EYES: ICD-10-CM

## 2019-01-07 PROCEDURE — 92133 CPTRZD OPH DX IMG PST SGM ON: CPT | Mod: 26,S$PBB,, | Performed by: OPTOMETRIST

## 2019-01-07 PROCEDURE — 92012 PR EYE EXAM, EST PATIENT,INTERMED: ICD-10-PCS | Mod: S$PBB,,, | Performed by: OPTOMETRIST

## 2019-01-07 PROCEDURE — 92083 EXTENDED VISUAL FIELD XM: CPT | Mod: PBBFAC,PO

## 2019-01-07 PROCEDURE — 76514 ECHO EXAM OF EYE THICKNESS: CPT | Mod: 26,S$PBB,, | Performed by: OPTOMETRIST

## 2019-01-07 PROCEDURE — 92012 INTRM OPH EXAM EST PATIENT: CPT | Mod: S$PBB,,, | Performed by: OPTOMETRIST

## 2019-01-07 PROCEDURE — 76514 ECHO EXAM OF EYE THICKNESS: CPT | Mod: PBBFAC,PO | Performed by: OPTOMETRIST

## 2019-01-07 PROCEDURE — 92083 EXTENDED VISUAL FIELD XM: CPT | Mod: 26,S$PBB,, | Performed by: OPTOMETRIST

## 2019-01-07 PROCEDURE — 99999 PR PBB SHADOW E&M-EST. PATIENT-LVL II: CPT | Mod: PBBFAC,,, | Performed by: OPTOMETRIST

## 2019-01-07 PROCEDURE — 99212 OFFICE O/P EST SF 10 MIN: CPT | Mod: PBBFAC,PO | Performed by: OPTOMETRIST

## 2019-01-07 PROCEDURE — 92083 HUMPHREY VISUAL FIELD - OU - BOTH EYES: ICD-10-PCS | Mod: 26,S$PBB,, | Performed by: OPTOMETRIST

## 2019-01-07 PROCEDURE — 92133 CPTRZD OPH DX IMG PST SGM ON: CPT | Mod: PBBFAC,PO

## 2019-01-07 PROCEDURE — 99999 PR PBB SHADOW E&M-EST. PATIENT-LVL II: ICD-10-PCS | Mod: PBBFAC,,, | Performed by: OPTOMETRIST

## 2019-01-07 PROCEDURE — 76514 PR  US, EYE, FOR CORNEAL THICKNESS: ICD-10-PCS | Mod: 26,S$PBB,, | Performed by: OPTOMETRIST

## 2019-01-07 PROCEDURE — 92133 POSTERIOR SEGMENT OCT OPTIC NERVE(OCULAR COHERENCE TOMOGRAPHY) - OU - BOTH EYES: ICD-10-PCS | Mod: 26,S$PBB,, | Performed by: OPTOMETRIST

## 2019-01-07 NOTE — PROGRESS NOTES
HPI     Presenting Complaint: Pt here today for yearly glaucoma suspect   screening. HVF 24-2 sf, OCT Nerve, PACH, and IOP done today. Pt has no new   complaints since last vist.       Last edited by Juve De La Fuente, OD on 1/7/2019 10:20 AM. (History)            Assessment /Plan     For exam results, see Encounter Report.    Open angle with borderline findings of both eyes      Glaucoma suspect secondary to larger than average CD ratio OU. Discussed results with patient. No evidence of glc loss at this time, monitor yearly.   (+) fam hx: sister  (+) DM  (+) HTN  (+) sleep apnea    IOP 01/07/19:  OD 12 mmHg, OS 12 mmHg    Photos 01/03/19    Pach 01/07/19:         OCT 01/07/19: OD no rnfl thinning     OS TS thinning    HVF 01/07/19:  OD within normal limits     OS within normal limits      RTC in 1 year for comprehensive eye exam, or sooner prn.

## 2019-01-15 ENCOUNTER — TELEPHONE (OUTPATIENT)
Dept: PHARMACY | Facility: CLINIC | Age: 69
End: 2019-01-15

## 2019-01-15 NOTE — TELEPHONE ENCOUNTER
Praluent follow up and refill attempted.  No answer.  LVM for call back.  1st dose on 1/1/19?  $3.80 copay in 004.

## 2019-01-24 ENCOUNTER — PATIENT MESSAGE (OUTPATIENT)
Dept: ALLERGY | Facility: CLINIC | Age: 69
End: 2019-01-24

## 2019-01-25 ENCOUNTER — TELEPHONE (OUTPATIENT)
Dept: PHARMACY | Facility: CLINIC | Age: 69
End: 2019-01-25

## 2019-01-25 NOTE — TELEPHONE ENCOUNTER
Incoming call in regards to Praluent. Confirmed two patient identifiers. Follow up assessment completed. Patient denies any side effects or missed doses. She does complain of increased pain in her knee but she is seeing her pain management MD to further evaluate. She mentions having 1 pen malfunction (she takes 2 pens monthly) on her last injection, so she only received half her dose (150mg). She was advised to call  for replacement, but she declines. Her next dose is due on February 1st, so [facility] will call back at the end of next week to coordinate her next fill. She confirms no changes to allergies, health conditions, medications, or insurance. She also denies any visits to urgent care or ER in the last 4 weeks. All questions answered to patient's satisfaction. [facility] will continue to f/u monthly for refills. TTN

## 2019-01-25 NOTE — TELEPHONE ENCOUNTER
" Patient states that she is having leg pains and acute lower left abdomen pain. She was seen by pain management and told it is likely just her kidney stones. The following day she saw her PCP - a CT was performed and "all is normal" which she doesn't understand. She is due for a "nerve block" shot that she thinks will help resolve the pain. She does classify the pain as similar to the statin pain she had in the past, but it's not too bad yet. She would like to continue Praluent; if pain continues or worsens she will call [facility]/MD and likely d/c medications. All questions asnwered to patient's satisfaction and next fill will be sent out on 1/29/19 per patient request. TTN   "

## 2019-01-31 ENCOUNTER — TELEPHONE (OUTPATIENT)
Dept: CARDIOLOGY | Facility: CLINIC | Age: 69
End: 2019-01-31

## 2019-01-31 LAB
C DIPHTHERIAE AB SER IA-ACNC: 0.4 IU/ML
C TETANI AB SER-ACNC: 0.56 IU/ML
DEPRECATED S PNEUM 1 IGG SER-MCNC: 0.7 MCG/ML
DEPRECATED S PNEUM12 IGG SER-MCNC: <0.3 MCG/ML
DEPRECATED S PNEUM14 IGG SER-MCNC: 0.4 MCG/ML
DEPRECATED S PNEUM19 IGG SER-MCNC: 0.9 MCG/ML
DEPRECATED S PNEUM23 IGG SER-MCNC: <0.3 MCG/ML
DEPRECATED S PNEUM3 IGG SER-MCNC: <0.3 MCG/ML
DEPRECATED S PNEUM4 IGG SER-MCNC: <0.3 MCG/ML
DEPRECATED S PNEUM5 IGG SER-MCNC: 0.8 MCG/ML
DEPRECATED S PNEUM8 IGG SER-MCNC: <0.3 MCG/ML
DEPRECATED S PNEUM9 IGG SER-MCNC: <0.3 MCG/ML
HAEM INFLU B IGG SER-MCNC: <0.15 MG/L
S PNEUM DA 18C IGG SER-MCNC: <0.3 MCG/ML
S PNEUM DA 6B IGG SER-MCNC: <0.3 MCG/ML
S PNEUM DA 7F IGG SER-MCNC: <0.3 MCG/ML
S PNEUM DA 9V IGG SER-MCNC: <0.3 MCG/ML

## 2019-02-19 ENCOUNTER — TELEPHONE (OUTPATIENT)
Dept: PHARMACY | Facility: CLINIC | Age: 69
End: 2019-02-19

## 2019-02-21 ENCOUNTER — TELEPHONE (OUTPATIENT)
Dept: ENDOCRINOLOGY | Facility: CLINIC | Age: 69
End: 2019-02-21

## 2019-02-26 ENCOUNTER — OFFICE VISIT (OUTPATIENT)
Dept: PODIATRY | Facility: CLINIC | Age: 69
End: 2019-02-26
Payer: MEDICARE

## 2019-02-26 ENCOUNTER — HOSPITAL ENCOUNTER (OUTPATIENT)
Dept: RADIOLOGY | Facility: CLINIC | Age: 69
Discharge: HOME | End: 2019-02-26
Attending: PHYSICIAN ASSISTANT
Payer: MEDICARE

## 2019-02-26 VITALS — WEIGHT: 293 LBS | BODY MASS INDEX: 45.99 KG/M2 | HEIGHT: 67 IN

## 2019-02-26 DIAGNOSIS — B35.1 ONYCHOMYCOSIS DUE TO DERMATOPHYTE: Primary | ICD-10-CM

## 2019-02-26 DIAGNOSIS — E11.49 TYPE II DIABETES MELLITUS WITH NEUROLOGICAL MANIFESTATIONS: ICD-10-CM

## 2019-02-26 DIAGNOSIS — Z78.0 POSTMENOPAUSAL: ICD-10-CM

## 2019-02-26 DIAGNOSIS — I73.9 CLAUDICATION OF BOTH LOWER EXTREMITIES: ICD-10-CM

## 2019-02-26 PROCEDURE — 99999 PR PBB SHADOW E&M-EST. PATIENT-LVL III: ICD-10-PCS | Mod: PBBFAC,,, | Performed by: PODIATRIST

## 2019-02-26 PROCEDURE — 99203 PR OFFICE/OUTPT VISIT, NEW, LEVL III, 30-44 MIN: ICD-10-PCS | Mod: S$PBB,,, | Performed by: PODIATRIST

## 2019-02-26 PROCEDURE — 77080 DXA BONE DENSITY AXIAL: CPT | Mod: TC,PO

## 2019-02-26 PROCEDURE — 77080 DXA BONE DENSITY AXIAL: CPT | Mod: 26,,, | Performed by: RADIOLOGY

## 2019-02-26 PROCEDURE — 77080 DEXA BONE DENSITY SPINE HIP: ICD-10-PCS | Mod: 26,,, | Performed by: RADIOLOGY

## 2019-02-26 PROCEDURE — 99213 OFFICE O/P EST LOW 20 MIN: CPT | Mod: PBBFAC,PO,25 | Performed by: PODIATRIST

## 2019-02-26 PROCEDURE — 99203 OFFICE O/P NEW LOW 30 MIN: CPT | Mod: S$PBB,,, | Performed by: PODIATRIST

## 2019-02-26 PROCEDURE — 99999 PR PBB SHADOW E&M-EST. PATIENT-LVL III: CPT | Mod: PBBFAC,,, | Performed by: PODIATRIST

## 2019-02-26 RX ORDER — LEVOCETIRIZINE DIHYDROCHLORIDE 5 MG/1
TABLET, FILM COATED ORAL
Status: ON HOLD | COMMUNITY
Start: 2019-02-05 | End: 2020-12-05 | Stop reason: HOSPADM

## 2019-02-26 RX ORDER — POTASSIUM CHLORIDE 750 MG/1
10 TABLET, EXTENDED RELEASE ORAL DAILY
COMMUNITY
Start: 2019-01-23 | End: 2021-10-22

## 2019-02-26 RX ORDER — DOXAZOSIN 2 MG/1
TABLET ORAL
COMMUNITY
Start: 2019-02-05 | End: 2019-06-04

## 2019-02-26 RX ORDER — CICLOPIROX 80 MG/ML
SOLUTION TOPICAL NIGHTLY
Qty: 6.6 ML | Refills: 11 | Status: SHIPPED | OUTPATIENT
Start: 2019-02-26 | End: 2020-08-28

## 2019-02-26 NOTE — PROGRESS NOTES
Subjective:      Patient ID: Juhi Taylor is a 68 y.o. female.    Chief Complaint: Diabetic Foot Exam (Tony Sadler- 1/15/19)    Juhi is a 68 y.o. female who presents to the clinic upon referral from Dr. Sadler  for evaluation and treatment of diabetic feet. Juhi has a past medical history of Cancer, Chronic sinusitis, Colitis, Coronary artery disease (2005), Diabetes mellitus, Gout, Hyperlipemia, Hypertension, Hypertrophy of nasal turbinates, Multiple thyroid nodules, NICOLE (obstructive sleep apnea), and Thyroid disease. Patient relates no major problem with feet. Only complaints today consist of Diabetes, increased risk amputation needing evaluation/management/optomization of foot care. .    PCP: Tony Sadler MD    Date Last Seen by PCP:   Chief Complaint   Patient presents with    Diabetic Foot Exam     Tony Sadler- 1/15/19        Current shoe gear: Casual shoes    Hemoglobin A1C   Date Value Ref Range Status   11/30/2018 5.8 (H) 4.0 - 5.6 % Final     Comment:     ADA Screening Guidelines:  5.7-6.4%  Consistent with prediabetes  >or=6.5%  Consistent with diabetes  High levels of fetal hemoglobin interfere with the HbA1C  assay. Heterozygous hemoglobin variants (HbS, HgC, etc)do  not significantly interfere with this assay.   However, presence of multiple variants may affect accuracy.     08/31/2017 6.7 (H) 0.0 - 5.6 % Final     Comment:     Reference Interval:  5.0 - 5.6 Normal   5.7 - 6.4 High Risk   > 6.5 Diabetic    Hgb A1c results are standardized based on the (NGSP) National   Glycohemoglobin Standardization Program.    Hemoglobin A1C levels are related to mean serum/plasma glucose   during the preceding 2-3 months.        01/06/2010 5.9 4.0 - 6.2 % Final           Review of Systems   Constitution: Negative for chills, diaphoresis, fever, malaise/fatigue and night sweats.   Cardiovascular: Positive for claudication. Negative for cyanosis, leg swelling and syncope.    Skin: Positive for nail changes. Negative for color change, dry skin, rash, suspicious lesions and unusual hair distribution.   Musculoskeletal: Negative for falls, joint pain, joint swelling, muscle cramps, muscle weakness and stiffness.   Gastrointestinal: Negative for constipation, diarrhea, nausea and vomiting.   Neurological: Positive for sensory change. Negative for brief paralysis, disturbances in coordination, focal weakness, numbness, paresthesias and tremors.           Objective:      Physical Exam   Constitutional: She is oriented to person, place, and time. She appears well-developed and well-nourished. She is cooperative.   Oriented to time, place, and person.   Cardiovascular:   Pulses:       Dorsalis pedis pulses are 1+ on the right side, and 1+ on the left side.        Posterior tibial pulses are 1+ on the right side, and 1+ on the left side.   Capillary fill time 3-5 seconds.  All toes warm to touch.      Negative lower extremity edema bilateral.    Negative elevational pallor and dependent rubor bilateral.     Musculoskeletal:   Normal angle, base, station of gait. Decreased stride length, early heel off, moderately propulsive toe off bilateral.    All ten toes without clubbing, cyanosis, or signs of ischemia.      No pain to palpation bilateral lower extremities.      Range of motion, stability, muscle strength, and muscle tone are age and health appropriate normal bilateral feet and legs.       Lymphadenopathy:   Negative lymphadenopathy bilateral popliteal fossa and tarsal tunnel.  Negative lymphangitic streaking bilateral foot/ankle bilateral.     Neurological: She is alert and oriented to person, place, and time. She has normal strength. She is not disoriented. She displays no atrophy and no tremor. A sensory deficit is present. She exhibits normal muscle tone.   Reflex Scores:       Patellar reflexes are 2+ on the right side and 2+ on the left side.       Achilles reflexes are 2+ on the  right side and 2+ on the left side.  Decreased/absent vibratory sensation bilateral feet to 128Hz tuning fork.     Skin: Skin is warm, dry and intact. No abrasion, no bruising, no burn, no ecchymosis, no laceration, no lesion, no petechiae and no rash noted. She is not diaphoretic. No cyanosis or erythema. No pallor. Nails show no clubbing.   Skin is normal age and health appropriate color, turgor, texture, and temperature bilateral lower extremities without ulceration, hyperpigmentation, discoloration, masses nodules or cords palpated.  No ecchymosis, erythema, edema, or cardinal signs of infection bilateral lower extremities.      Toenails 1st, 2nd, 3rd, 4th, 5th  bilateral are discolored tanish brown with tan, gray crumbly subungual debris.  Long, not tender to distal nail plate pressure, without periungual skin abnormality of each.               Assessment:       Encounter Diagnoses   Name Primary?    Onychomycosis due to dermatophyte Yes    Type II diabetes mellitus with neurological manifestations     Claudication of both lower extremities          Plan:       Juhi was seen today for diabetic foot exam.    Diagnoses and all orders for this visit:    Onychomycosis due to dermatophyte  -     US Lower Extrem Arteries Bilat with ALPHONSE; Future    Type II diabetes mellitus with neurological manifestations  -     US Lower Extrem Arteries Bilat with ALPHONSE; Future    Claudication of both lower extremities  -     US Lower Extrem Arteries Bilat with ALPHONSE; Future    Other orders  -     ciclopirox (PENLAC) 8 % Soln; Apply topically nightly.      I counseled the patient on her conditions, their implications and medical management.        - Shoe inspection. Diabetic Foot Education. Patient reminded of the importance of good nutrition and blood sugar control to help prevent podiatric complications of diabetes. Patient instructed on proper foot hygeine. We discussed wearing proper shoe gear, daily foot inspections, never  walking without protective shoe gear, never putting sharp instruments to feet, routine podiatric nail visits every 2-3 months.      Discussed conservative treatment with shoes of adequate dimensions, material, and style to alleviate symptoms and delay or prevent surgical intervention.    Rx penlac, arterial US bilateral    Declines non covered foot care        Follow-up in about 1 year (around 2/26/2020).

## 2019-02-26 NOTE — LETTER
February 26, 2019      Tony Sadler MD  [address] [facility] [address]           [facility] - Podiatry  [address]  [facility] [address]  Phone: [phone]          Patient: Juhi Taylor   MR Number: [id number]   YOB: 1950   Date of Visit: 2/26/2019       Dear Dr. Tony Sadler:    Thank you for referring Juhi Taylor to me for evaluation. Attached you will find relevant portions of my assessment and plan of care.    If you have questions, please do not hesitate to call me. I look forward to following Juhi Taylor along with you.    Sincerely,    Nolan Ford, SEVERIANO    Enclosure  CC:  No Recipients    If you would like to receive this communication electronically, please contact [email or web address] or [phone] to request more information on [employer] access.    For providers and/or their staff who would like to refer a patient to [facility], please contact us through our one-stop-shop provider referral line, [facility] , at [phone].    If you feel you have received this communication in error or would no longer like to receive these types of communications, please e-mail [email or web address]

## 2019-02-28 ENCOUNTER — HOSPITAL ENCOUNTER (OUTPATIENT)
Dept: RADIOLOGY | Facility: HOSPITAL | Age: 69
Discharge: HOME | End: 2019-02-28
Attending: PODIATRIST
Payer: MEDICARE

## 2019-02-28 DIAGNOSIS — E11.49 TYPE II DIABETES MELLITUS WITH NEUROLOGICAL MANIFESTATIONS: ICD-10-CM

## 2019-02-28 DIAGNOSIS — I73.9 CLAUDICATION OF BOTH LOWER EXTREMITIES: ICD-10-CM

## 2019-02-28 DIAGNOSIS — B35.1 ONYCHOMYCOSIS DUE TO DERMATOPHYTE: ICD-10-CM

## 2019-02-28 PROCEDURE — 93922 UPR/L XTREMITY ART 2 LEVELS: CPT | Mod: TC

## 2019-02-28 PROCEDURE — 93925 LOWER EXTREMITY STUDY: CPT | Mod: 26,,, | Performed by: RADIOLOGY

## 2019-02-28 PROCEDURE — 93922 UPR/L XTREMITY ART 2 LEVELS: CPT | Mod: 26,,, | Performed by: RADIOLOGY

## 2019-02-28 PROCEDURE — 93925 US ARTERIAL LOWER EXTREMITY BILAT WITH ABI (XPD): ICD-10-PCS | Mod: 26,,, | Performed by: RADIOLOGY

## 2019-02-28 PROCEDURE — 93922 US ARTERIAL LOWER EXTREMITY BILAT WITH ABI (XPD): ICD-10-PCS | Mod: 26,,, | Performed by: RADIOLOGY

## 2019-03-01 ENCOUNTER — OFFICE VISIT (OUTPATIENT)
Dept: ENDOCRINOLOGY | Facility: CLINIC | Age: 69
End: 2019-03-01
Payer: MEDICARE

## 2019-03-01 ENCOUNTER — TELEPHONE (OUTPATIENT)
Dept: PODIATRY | Facility: CLINIC | Age: 69
End: 2019-03-01

## 2019-03-01 VITALS
RESPIRATION RATE: 18 BRPM | HEIGHT: 67 IN | HEART RATE: 58 BPM | BODY MASS INDEX: 45.99 KG/M2 | DIASTOLIC BLOOD PRESSURE: 77 MMHG | SYSTOLIC BLOOD PRESSURE: 174 MMHG | WEIGHT: 293 LBS | TEMPERATURE: 98 F

## 2019-03-01 DIAGNOSIS — E04.1 THYROID NODULE: Primary | ICD-10-CM

## 2019-03-01 DIAGNOSIS — E78.00 PURE HYPERCHOLESTEROLEMIA: ICD-10-CM

## 2019-03-01 DIAGNOSIS — E55.9 HYPOVITAMINOSIS D: ICD-10-CM

## 2019-03-01 DIAGNOSIS — I10 ESSENTIAL HYPERTENSION: ICD-10-CM

## 2019-03-01 DIAGNOSIS — E03.9 HYPOTHYROIDISM, UNSPECIFIED TYPE: ICD-10-CM

## 2019-03-01 DIAGNOSIS — G47.33 OSA (OBSTRUCTIVE SLEEP APNEA): ICD-10-CM

## 2019-03-01 DIAGNOSIS — E11.9 TYPE 2 DIABETES MELLITUS WITHOUT COMPLICATION, WITHOUT LONG-TERM CURRENT USE OF INSULIN: ICD-10-CM

## 2019-03-01 PROCEDURE — 99214 OFFICE O/P EST MOD 30 MIN: CPT | Mod: S$PBB,,, | Performed by: PHYSICIAN ASSISTANT

## 2019-03-01 PROCEDURE — 99999 PR PBB SHADOW E&M-EST. PATIENT-LVL IV: CPT | Mod: PBBFAC,,, | Performed by: PHYSICIAN ASSISTANT

## 2019-03-01 PROCEDURE — 99999 PR PBB SHADOW E&M-EST. PATIENT-LVL IV: ICD-10-PCS | Mod: PBBFAC,,, | Performed by: PHYSICIAN ASSISTANT

## 2019-03-01 PROCEDURE — 99214 PR OFFICE/OUTPT VISIT, EST, LEVL IV, 30-39 MIN: ICD-10-PCS | Mod: S$PBB,,, | Performed by: PHYSICIAN ASSISTANT

## 2019-03-01 PROCEDURE — 99214 OFFICE O/P EST MOD 30 MIN: CPT | Mod: PBBFAC,PO | Performed by: PHYSICIAN ASSISTANT

## 2019-03-01 RX ORDER — ERGOCALCIFEROL 1.25 MG/1
CAPSULE ORAL
Qty: 36 CAPSULE | Refills: 1 | Status: SHIPPED | OUTPATIENT
Start: 2019-03-01 | End: 2019-08-13 | Stop reason: SDUPTHER

## 2019-03-01 NOTE — PROGRESS NOTES
"CC: DM/thyroid nodule/hypothyroidism    HPI: Juhi Taylor was diagnosed with Type 2 DM ~15 years ago. No hospitalizations for DM. Her daughter has DM.  No fhx of thyroid disease.     She is not taking spironolactone due to muscle cramps. Her PCP stopped norvasc and started cardura.    CURRENT DIABETIC MEDS: glipizide 5 mg BID, metformin 1000 mg daily.     Does not check BG.   Hypoglycemia: No. .   Type of Glucose Meter: one touch verio    Physical Activity: She stopped doing water aerobics, plans to go to the gym this summer when her kids are out of school.     Dietary Habits: 1-2 meals daily.     Last Eye Exam: 1/19  Last Podiatry Exam: n/a    Last DM Education Attended: Last year    Thyroid nodule  Dx 5-10 years ago  Bx in 2015 of left thyroid nodule was benign  Thyroid u/s 12/18 showed a dominant nodule 1.1 cm in the right lobe  No new voice changes. + dysphagia and SOB.     Hypothyroidism  10 years  LT4 50 mcg qd  +  hair loss (hereditary)  No h/c intolerance, palpitations, insomnia    NICOLE-wears cpap    Social Hx: smoked 1 ppd for 40 years. She quit 2 years ago.  No ETOH use.     DEXA 2/18: Osteopenia. Taking ergo 3x weekly and 2000 IU daily    REVIEW OF SYSTEMS,  General: no weakness or fatigue, wt gain.   Eyes: no intermittent blurry vision or visual disturbances.   Cardiac: no chest pain or palpiatiaons.   Respiratory: no cough or dyspnea.   GI: no abdominal pain or nausea.   Skin: no rashes or itching.   Neuro: no numbness or tingling.   Endocrine: no polyuria, polydipsia, polyphagia.   Remainder ROS negative    Vital Signs  BP (!) 174/77 (BP Location: Right arm, Patient Position: Sitting, BP Method: Medium (Automatic))   Pulse (!) 58   Temp 97.7 °F (36.5 °C) (Oral)   Resp 18   Ht 5' 7" (1.702 m)   Wt (!) 150.8 kg (332 lb 5.5 oz)   BMI 52.05 kg/m²     Personally reviewed labs below:     Hemoglobin A1C   Date Value Ref Range Status   02/26/2019 6.6 (H) 4.0 - 5.6 % Final     Comment:     ADA " Screening Guidelines:  5.7-6.4%  Consistent with prediabetes  >or=6.5%  Consistent with diabetes  High levels of fetal hemoglobin interfere with the HbA1C  assay. Heterozygous hemoglobin variants (HbS, HgC, etc)do  not significantly interfere with this assay.   However, presence of multiple variants may affect accuracy.     11/30/2018 5.8 (H) 4.0 - 5.6 % Final     Comment:     ADA Screening Guidelines:  5.7-6.4%  Consistent with prediabetes  >or=6.5%  Consistent with diabetes  High levels of fetal hemoglobin interfere with the HbA1C  assay. Heterozygous hemoglobin variants (HbS, HgC, etc)do  not significantly interfere with this assay.   However, presence of multiple variants may affect accuracy.     08/31/2017 6.7 (H) 0.0 - 5.6 % Final     Comment:     Reference Interval:  5.0 - 5.6 Normal   5.7 - 6.4 High Risk   > 6.5 Diabetic    Hgb A1c results are standardized based on the (NGSP) National   Glycohemoglobin Standardization Program.    Hemoglobin A1C levels are related to mean serum/plasma glucose   during the preceding 2-3 months.            Chemistry        Component Value Date/Time     02/26/2019 0932    K 3.9 02/26/2019 0932     02/26/2019 0932    CO2 26 02/26/2019 0932    BUN 18 02/26/2019 0932    CREATININE 0.9 02/26/2019 0932     (H) 02/26/2019 0932        Component Value Date/Time    CALCIUM 10.2 02/26/2019 0932    ALKPHOS 101 11/28/2018 1559    AST 18 11/28/2018 1559    ALT 20 11/28/2018 1559    BILITOT 0.4 11/28/2018 1559    ESTGFRAFRICA >60.0 02/26/2019 0932    EGFRNONAA >60.0 02/26/2019 0932        Lab Results   Component Value Date    CHOL 201 (H) 11/30/2018    CHOL 172 08/31/2018    CHOL 181 01/06/2010     Lab Results   Component Value Date    HDL 48 11/30/2018    HDL 38 (L) 08/31/2018    HDL 38 (L) 01/06/2010     Lab Results   Component Value Date    LDLCALC 129.4 11/30/2018    LDLCALC 112.2 08/31/2018    LDLCALC 122.4 01/06/2010     Lab Results   Component Value Date    TRIG 118  11/30/2018    TRIG 109 08/31/2018    TRIG 103 01/06/2010     Lab Results   Component Value Date    CHOLHDL 23.9 11/30/2018    CHOLHDL 22.1 08/31/2018    CHOLHDL 21.0 01/06/2010     Lab Results   Component Value Date    TSH 0.426 02/26/2019     2016 u/s      2018 below      Lab Results   Component Value Date    MICALBCREAT 121.1 (H) 11/30/2018       Vit D, 25-Hydroxy   Date Value Ref Range Status   02/26/2019 32 30 - 96 ng/mL Final     Comment:     Vitamin D deficiency.........<10 ng/mL                              Vitamin D insufficiency......10-29 ng/mL       Vitamin D sufficiency........> or equal to 30 ng/mL  Vitamin D toxicity............>100 ng/mL       PHYSICAL EXAMINATION  Constitutional: middle aged female, appears well, no distress  Neck: Supple, trachea midline.   Respiratory: even and unlabored, CTA without wheezes.  Cardiovascular: RRR; no carotid bruits or murmurs.   Lymph: DP pulses  2+ bilaterally; 1+ edema.   Skin: warm and dry; no acanthosis nigracans observed.  Neuro: patient alert and cooperative; CN 2-12 grossly intact  Feet: appropriate footwear    Assessment/Plan    1. Thyroid nodule     2. Hypothyroidism, unspecified type     3. Type 2 diabetes mellitus without complication, without long-term current use of insulin     4. Essential hypertension     5. Pure hypercholesterolemia     6. Hypovitaminosis D  ergocalciferol (ERGOCALCIFEROL) 50,000 unit Cap   7. NICOLE (obstructive sleep apnea)        Thyroid nodule-repeat thyroid u/s 12/19  Hypothyroidism-continue LT4 dose. TFTs wnl.  [id number] has increased. Start Trulicity 0.75 mg once weekly. Increase to 1.5 mg after one month. Discussed side effects. BG checks 1x every other day.Hypoglycemia and diabetic foot care discussed.  HTN-elevated-restart novasc. Continue ARB.   HLD-stable-statin intolerant. Continue praulent 300 mg monthly.  Hypovitaminosis D- vd is normal. Continue ergo.  [id number] CPAP    FOLLOW UP  3 mths

## 2019-03-01 NOTE — TELEPHONE ENCOUNTER
----- Message from Nolan Ford DPM sent at 2/28/2019  4:23 PM CST -----  Next available vascular surgery consult.

## 2019-03-04 DIAGNOSIS — E04.1 NODULAR THYROID DISEASE: Primary | ICD-10-CM

## 2019-03-06 ENCOUNTER — TELEPHONE (OUTPATIENT)
Dept: ENDOCRINOLOGY | Facility: CLINIC | Age: 69
End: 2019-03-06

## 2019-03-06 NOTE — TELEPHONE ENCOUNTER
----- Message from DEEJAY Adams PA-C sent at 3/3/2019  8:28 AM CST -----  Please have Ms. Taylor check her lipid panel, AST, ALT in ~ 2 weeks.

## 2019-03-15 ENCOUNTER — TELEPHONE (OUTPATIENT)
Dept: ENDOCRINOLOGY | Facility: CLINIC | Age: 69
End: 2019-03-15

## 2019-03-15 NOTE — TELEPHONE ENCOUNTER
Spoke to pt , pt was concerned why she is having blood work again on 3/20/19 since she had recent labs on 2/26/19.

## 2019-03-18 ENCOUNTER — TELEPHONE (OUTPATIENT)
Dept: PHARMACY | Facility: CLINIC | Age: 69
End: 2019-03-18

## 2019-03-20 ENCOUNTER — LAB VISIT (OUTPATIENT)
Dept: LAB | Facility: HOSPITAL | Age: 69
End: 2019-03-20
Attending: PHYSICIAN ASSISTANT
Payer: MEDICARE

## 2019-03-20 ENCOUNTER — PATIENT MESSAGE (OUTPATIENT)
Dept: PODIATRY | Facility: CLINIC | Age: 69
End: 2019-03-20

## 2019-03-20 DIAGNOSIS — E78.00 PURE HYPERCHOLESTEROLEMIA: ICD-10-CM

## 2019-03-20 LAB
ALT SERPL W/O P-5'-P-CCNC: 16 U/L
AST SERPL-CCNC: 20 U/L
CHOLEST SERPL-MCNC: 116 MG/DL
CHOLEST/HDLC SERPL: 2.5 {RATIO}
HDLC SERPL-MCNC: 46 MG/DL
HDLC SERPL: 39.7 %
LDLC SERPL CALC-MCNC: 49.2 MG/DL
NONHDLC SERPL-MCNC: 70 MG/DL
TRIGL SERPL-MCNC: 104 MG/DL

## 2019-03-20 PROCEDURE — 84450 TRANSFERASE (AST) (SGOT): CPT

## 2019-03-20 PROCEDURE — 36415 COLL VENOUS BLD VENIPUNCTURE: CPT | Mod: PO

## 2019-03-20 PROCEDURE — 80061 LIPID PANEL: CPT

## 2019-03-20 PROCEDURE — 84460 ALANINE AMINO (ALT) (SGPT): CPT

## 2019-03-25 ENCOUNTER — PATIENT MESSAGE (OUTPATIENT)
Dept: ENDOCRINOLOGY | Facility: CLINIC | Age: 69
End: 2019-03-25

## 2019-03-25 ENCOUNTER — TELEPHONE (OUTPATIENT)
Dept: CARDIOLOGY | Facility: CLINIC | Age: 69
End: 2019-03-25

## 2019-03-25 ENCOUNTER — TELEPHONE (OUTPATIENT)
Dept: VASCULAR SURGERY | Facility: CLINIC | Age: 69
End: 2019-03-25

## 2019-03-25 NOTE — TELEPHONE ENCOUNTER
----- Message from Wanda Dooley sent at 3/25/2019  3:10 PM CDT -----  Type:  Sooner Apoointment Request    Caller is requesting a sooner appointment.     Name of Caller:  self  When is the first available appointment?  N/a   Symptoms:  Not feeling well  Best Call Back Number:  [phone] (home)     Additional Information:  [facility]

## 2019-03-25 NOTE — TELEPHONE ENCOUNTER
----- Message from Jair Martinez sent at 3/25/2019  3:51 PM CDT -----  Type: Needs Medical Advice    Who Called:  Patient  Best Call Back Number: [phone] (home)   Additional Information: Patient would like to speak to the office regarding a few things - please call to advise.

## 2019-03-25 NOTE — TELEPHONE ENCOUNTER
"Per patient, Dr Ford is referring her for "my arteries in my legs".  Arterial ultrasounds, ordered by Dr Ford, done on 2/28/19.  Next available consultation with Dr Villasenor scheduled on 5/2/19.  "

## 2019-03-29 ENCOUNTER — TELEPHONE (OUTPATIENT)
Dept: PODIATRY | Facility: CLINIC | Age: 69
End: 2019-03-29

## 2019-03-29 NOTE — TELEPHONE ENCOUNTER
----- Message from Sylvie Silva sent at 3/29/2019  8:00 AM CDT -----  Contact: Meg portillo/ [facility]  Type: Needs Medical Advice    Who Called:  [facility] Call Back Number: [phone]  Additional Information: A referral was supposed to have been faxed to their office. They have not received it and the pt is due in within the next hour. Pls call to adv

## 2019-04-04 RX ORDER — IRBESARTAN 300 MG/1
TABLET ORAL
Qty: 90 TABLET | Refills: 1 | Status: SHIPPED | OUTPATIENT
Start: 2019-04-04 | End: 2019-09-18 | Stop reason: SDUPTHER

## 2019-04-11 ENCOUNTER — TELEPHONE (OUTPATIENT)
Dept: ENDOCRINOLOGY | Facility: CLINIC | Age: 69
End: 2019-04-11

## 2019-04-11 ENCOUNTER — TELEPHONE (OUTPATIENT)
Dept: PHARMACY | Facility: CLINIC | Age: 69
End: 2019-04-11

## 2019-04-11 DIAGNOSIS — Z78.9 STATIN INTOLERANCE: Primary | ICD-10-CM

## 2019-04-11 DIAGNOSIS — E78.00 PURE HYPERCHOLESTEROLEMIA: ICD-10-CM

## 2019-04-11 NOTE — TELEPHONE ENCOUNTER
Please call Ms. Taylor and let her know that she should keep taking her cholesterol medication. Her cholesterol decreased because she was on medication.

## 2019-04-12 NOTE — TELEPHONE ENCOUNTER
Patient advised she does not want to take cholesterol medication anymore.. Her numbers are good now she advised she does not want to take monthly.. I advised cholesterol will come up once she stop.

## 2019-06-01 ENCOUNTER — LAB VISIT (OUTPATIENT)
Dept: LAB | Facility: HOSPITAL | Age: 69
End: 2019-06-01
Attending: PHYSICIAN ASSISTANT
Payer: MEDICARE

## 2019-06-01 DIAGNOSIS — E11.9 TYPE 2 DIABETES MELLITUS WITHOUT COMPLICATION, WITHOUT LONG-TERM CURRENT USE OF INSULIN: ICD-10-CM

## 2019-06-01 DIAGNOSIS — E78.00 PURE HYPERCHOLESTEROLEMIA: ICD-10-CM

## 2019-06-01 LAB
ALBUMIN SERPL BCP-MCNC: 3.3 G/DL (ref 3.5–5.2)
ALBUMIN SERPL BCP-MCNC: 3.3 G/DL (ref 3.5–5.2)
ALP SERPL-CCNC: 98 U/L (ref 55–135)
ALT SERPL W/O P-5'-P-CCNC: 22 U/L (ref 10–44)
ANION GAP SERPL CALC-SCNC: 10 MMOL/L (ref 8–16)
ANION GAP SERPL CALC-SCNC: 10 MMOL/L (ref 8–16)
AST SERPL-CCNC: 21 U/L (ref 10–40)
BILIRUB SERPL-MCNC: 0.4 MG/DL (ref 0.1–1)
BUN SERPL-MCNC: 15 MG/DL (ref 8–23)
BUN SERPL-MCNC: 15 MG/DL (ref 8–23)
CA-I BLDV-SCNC: 1.33 MMOL/L (ref 1.06–1.42)
CALCIUM SERPL-MCNC: 10.5 MG/DL (ref 8.7–10.5)
CALCIUM SERPL-MCNC: 10.5 MG/DL (ref 8.7–10.5)
CHLORIDE SERPL-SCNC: 107 MMOL/L (ref 95–110)
CHLORIDE SERPL-SCNC: 107 MMOL/L (ref 95–110)
CHOLEST SERPL-MCNC: 170 MG/DL (ref 120–199)
CHOLEST/HDLC SERPL: 4 {RATIO} (ref 2–5)
CO2 SERPL-SCNC: 25 MMOL/L (ref 23–29)
CO2 SERPL-SCNC: 25 MMOL/L (ref 23–29)
CREAT SERPL-MCNC: 0.8 MG/DL (ref 0.5–1.4)
CREAT SERPL-MCNC: 0.8 MG/DL (ref 0.5–1.4)
EST. GFR  (AFRICAN AMERICAN): >60 ML/MIN/1.73 M^2
EST. GFR  (AFRICAN AMERICAN): >60 ML/MIN/1.73 M^2
EST. GFR  (NON AFRICAN AMERICAN): >60 ML/MIN/1.73 M^2
EST. GFR  (NON AFRICAN AMERICAN): >60 ML/MIN/1.73 M^2
ESTIMATED AVG GLUCOSE: 114 MG/DL (ref 68–131)
GLUCOSE SERPL-MCNC: 108 MG/DL (ref 70–110)
GLUCOSE SERPL-MCNC: 108 MG/DL (ref 70–110)
HBA1C MFR BLD HPLC: 5.6 % (ref 4–5.6)
HDLC SERPL-MCNC: 42 MG/DL (ref 40–75)
HDLC SERPL: 24.7 % (ref 20–50)
LDLC SERPL CALC-MCNC: 97.8 MG/DL (ref 63–159)
MAGNESIUM SERPL-MCNC: 1.7 MG/DL (ref 1.6–2.6)
NONHDLC SERPL-MCNC: 128 MG/DL
PHOSPHATE SERPL-MCNC: 3.2 MG/DL (ref 2.7–4.5)
POTASSIUM SERPL-SCNC: 4 MMOL/L (ref 3.5–5.1)
POTASSIUM SERPL-SCNC: 4 MMOL/L (ref 3.5–5.1)
PROT SERPL-MCNC: 6.9 G/DL (ref 6–8.4)
PTH-INTACT SERPL-MCNC: 146 PG/ML (ref 9–77)
SODIUM SERPL-SCNC: 142 MMOL/L (ref 136–145)
SODIUM SERPL-SCNC: 142 MMOL/L (ref 136–145)
T3 SERPL-MCNC: 79 NG/DL (ref 60–180)
T4 FREE SERPL-MCNC: 1.02 NG/DL (ref 0.71–1.51)
TRIGL SERPL-MCNC: 151 MG/DL (ref 30–150)
TSH SERPL DL<=0.005 MIU/L-ACNC: 0.36 UIU/ML (ref 0.4–4)

## 2019-06-01 PROCEDURE — 84378 SUGARS SINGLE QUANT: CPT

## 2019-06-01 PROCEDURE — 80053 COMPREHEN METABOLIC PANEL: CPT

## 2019-06-01 PROCEDURE — 82985 ASSAY OF GLYCATED PROTEIN: CPT

## 2019-06-01 PROCEDURE — 83970 ASSAY OF PARATHORMONE: CPT

## 2019-06-01 PROCEDURE — 36415 COLL VENOUS BLD VENIPUNCTURE: CPT | Mod: PO

## 2019-06-01 PROCEDURE — 84443 ASSAY THYROID STIM HORMONE: CPT

## 2019-06-01 PROCEDURE — 84439 ASSAY OF FREE THYROXINE: CPT

## 2019-06-01 PROCEDURE — 83036 HEMOGLOBIN GLYCOSYLATED A1C: CPT | Mod: 59

## 2019-06-01 PROCEDURE — 83735 ASSAY OF MAGNESIUM: CPT

## 2019-06-01 PROCEDURE — 84480 ASSAY TRIIODOTHYRONINE (T3): CPT

## 2019-06-01 PROCEDURE — 80061 LIPID PANEL: CPT

## 2019-06-01 PROCEDURE — 82330 ASSAY OF CALCIUM: CPT

## 2019-06-01 PROCEDURE — 84100 ASSAY OF PHOSPHORUS: CPT

## 2019-06-03 ENCOUNTER — HOSPITAL ENCOUNTER (OUTPATIENT)
Dept: RADIOLOGY | Facility: HOSPITAL | Age: 69
Discharge: HOME | End: 2019-06-03
Attending: PHYSICIAN ASSISTANT
Payer: MEDICARE

## 2019-06-03 DIAGNOSIS — E04.1 NODULAR THYROID DISEASE: ICD-10-CM

## 2019-06-03 PROCEDURE — 76536 US EXAM OF HEAD AND NECK: CPT | Mod: 26,,, | Performed by: RADIOLOGY

## 2019-06-03 PROCEDURE — 76536 US EXAM OF HEAD AND NECK: CPT | Mod: TC

## 2019-06-03 PROCEDURE — 76536 US SOFT TISSUE HEAD NECK THYROID: ICD-10-PCS | Mod: 26,,, | Performed by: RADIOLOGY

## 2019-06-04 ENCOUNTER — OFFICE VISIT (OUTPATIENT)
Dept: ENDOCRINOLOGY | Facility: CLINIC | Age: 69
End: 2019-06-04
Payer: MEDICARE

## 2019-06-04 VITALS
WEIGHT: 293 LBS | HEART RATE: 55 BPM | DIASTOLIC BLOOD PRESSURE: 75 MMHG | TEMPERATURE: 98 F | SYSTOLIC BLOOD PRESSURE: 173 MMHG | BODY MASS INDEX: 45.99 KG/M2 | HEIGHT: 67 IN

## 2019-06-04 DIAGNOSIS — E04.1 THYROID NODULE: Primary | ICD-10-CM

## 2019-06-04 DIAGNOSIS — E11.9 TYPE 2 DIABETES MELLITUS WITHOUT COMPLICATION, WITHOUT LONG-TERM CURRENT USE OF INSULIN: ICD-10-CM

## 2019-06-04 DIAGNOSIS — M10.9 GOUT, UNSPECIFIED CAUSE, UNSPECIFIED CHRONICITY, UNSPECIFIED SITE: ICD-10-CM

## 2019-06-04 DIAGNOSIS — I10 ESSENTIAL HYPERTENSION: ICD-10-CM

## 2019-06-04 DIAGNOSIS — G47.33 OSA (OBSTRUCTIVE SLEEP APNEA): ICD-10-CM

## 2019-06-04 DIAGNOSIS — E78.00 PURE HYPERCHOLESTEROLEMIA: ICD-10-CM

## 2019-06-04 DIAGNOSIS — E55.9 HYPOVITAMINOSIS D: ICD-10-CM

## 2019-06-04 DIAGNOSIS — E03.9 HYPOTHYROIDISM, UNSPECIFIED TYPE: ICD-10-CM

## 2019-06-04 DIAGNOSIS — E21.3 HYPERPARATHYROIDISM: ICD-10-CM

## 2019-06-04 LAB — FRUCTOSAMINE SERPL-SCNC: 176 UMOL /L

## 2019-06-04 PROCEDURE — 99999 PR PBB SHADOW E&M-EST. PATIENT-LVL III: ICD-10-PCS | Mod: PBBFAC,,, | Performed by: PHYSICIAN ASSISTANT

## 2019-06-04 PROCEDURE — 99213 OFFICE O/P EST LOW 20 MIN: CPT | Mod: PBBFAC,PO | Performed by: PHYSICIAN ASSISTANT

## 2019-06-04 PROCEDURE — 99213 PR OFFICE/OUTPT VISIT, EST, LEVL III, 20-29 MIN: ICD-10-PCS | Mod: S$PBB,,, | Performed by: PHYSICIAN ASSISTANT

## 2019-06-04 PROCEDURE — 99999 PR PBB SHADOW E&M-EST. PATIENT-LVL III: CPT | Mod: PBBFAC,,, | Performed by: PHYSICIAN ASSISTANT

## 2019-06-04 PROCEDURE — 99213 OFFICE O/P EST LOW 20 MIN: CPT | Mod: S$PBB,,, | Performed by: PHYSICIAN ASSISTANT

## 2019-06-04 RX ORDER — LEVOTHYROXINE SODIUM 50 UG/1
50 TABLET ORAL
COMMUNITY
Start: 2019-04-08 | End: 2021-04-28

## 2019-06-04 NOTE — PROGRESS NOTES
"CC: DM/thyroid nodule/hypothyroidism    HPI: Juhi Taylor was diagnosed with Type 2 DM ~15 years ago. No hospitalizations for DM. Her daughter has DM.  No fhx of thyroid disease.     She started praulent but stopped because "her cholesterol was good." Cholesterol has slightly increased since the prior test when her LDL decreased to 50. Pt stopped taking Trulicity. Pt also has hyperparathyroidism. No n/v or abdominal pain. She had a CT in 1/19 that was apparently normal.    CURRENT DIABETIC MEDS: glipizide 5 mg BID, metformin 1000 mg daily    Does not check BG.   Hypoglycemia: No. .   Type of Glucose Meter: one touch verio    Physical Activity: She has been walking.    Dietary Habits: 1-2 meal daily. She has stopped eating carbohydrates since 1 mth ago. Drinks water.     Last Eye Exam: 1/19  Last Podiatry Exam: 2/19    Last DM Education Attended: Last year    Thyroid nodule  Dx 5-10 years ago  Bx in 2015 of left thyroid nodule was benign  Thyroid u/s 6/19 showed a dominant nodule 1.5 cm in the right lobe which could not be reached on FNA. Left lobe has a 2.4 cm nodule that has not changed since the prior exam.  No new voice changes. + dysphagia and SOB.     Hypothyroidism  10 years  LT4 50 mcg qd  +  hair loss (hereditary)  No h/c intolerance, palpitations, insomnia    NICOLE-wears cpap    Social Hx: smoked 1 ppd for 40 years. She quit 2 years ago.  No ETOH use.     DEXA 2/18: Osteopenia. Taking ergo 3x weekly.    REVIEW OF SYSTEMS,  General: + postnasal drip, no weakness or fatigue, wt gain.   Eyes: no intermittent blurry vision or visual disturbances.   Cardiac: no chest pain or palpiatiaons.   Respiratory: no cough or dyspnea.   GI: no abdominal pain or nausea.   Skin: no rashes or itching.   Neuro: no numbness or tingling.   Endocrine: no polyuria, polydipsia, polyphagia.   Remainder ROS negative    Vital Signs  BP (!) 173/75 (BP Location: Right arm, Patient Position: Sitting, BP Method: Large (Automatic))  " " Pulse (!) 55   Temp 98.3 °F (36.8 °C) (Oral)   Ht 5' 7" (1.702 m)   Wt (!) 150.8 kg (332 lb 5.5 oz)   BMI 52.05 kg/m²     Personally reviewed labs below with pt:     Hemoglobin A1C   Date Value Ref Range Status   06/01/2019 5.6 4.0 - 5.6 % Final     Comment:     ADA Screening Guidelines:  5.7-6.4%  Consistent with prediabetes  >or=6.5%  Consistent with diabetes  High levels of fetal hemoglobin interfere with the HbA1C  assay. Heterozygous hemoglobin variants (HbS, HgC, etc)do  not significantly interfere with this assay.   However, presence of multiple variants may affect accuracy.     02/26/2019 6.6 (H) 4.0 - 5.6 % Final     Comment:     ADA Screening Guidelines:  5.7-6.4%  Consistent with prediabetes  >or=6.5%  Consistent with diabetes  High levels of fetal hemoglobin interfere with the HbA1C  assay. Heterozygous hemoglobin variants (HbS, HgC, etc)do  not significantly interfere with this assay.   However, presence of multiple variants may affect accuracy.     11/30/2018 5.8 (H) 4.0 - 5.6 % Final     Comment:     ADA Screening Guidelines:  5.7-6.4%  Consistent with prediabetes  >or=6.5%  Consistent with diabetes  High levels of fetal hemoglobin interfere with the HbA1C  assay. Heterozygous hemoglobin variants (HbS, HgC, etc)do  not significantly interfere with this assay.   However, presence of multiple variants may affect accuracy.         Chemistry        Component Value Date/Time     06/01/2019 1045     06/01/2019 1045    K 4.0 06/01/2019 1045    K 4.0 06/01/2019 1045     06/01/2019 1045     06/01/2019 1045    CO2 25 06/01/2019 1045    CO2 25 06/01/2019 1045    BUN 15 06/01/2019 1045    BUN 15 06/01/2019 1045    CREATININE 0.8 06/01/2019 1045    CREATININE 0.8 06/01/2019 1045     06/01/2019 1045     06/01/2019 1045        Component Value Date/Time    CALCIUM 10.5 06/01/2019 1045    CALCIUM 10.5 06/01/2019 1045    ALKPHOS 98 06/01/2019 1045    AST 21 06/01/2019 1045    " ALT 22 06/01/2019 1045    BILITOT 0.4 06/01/2019 1045    ESTGFRAFRICA >60.0 06/01/2019 1045    ESTGFRAFRICA >60.0 06/01/2019 1045    EGFRNONAA >60.0 06/01/2019 1045    EGFRNONAA >60.0 06/01/2019 1045        Lab Results   Component Value Date    CHOL 170 06/01/2019    CHOL 116 (L) 03/20/2019    CHOL 201 (H) 11/30/2018     Lab Results   Component Value Date    HDL 42 06/01/2019    HDL 46 03/20/2019    HDL 48 11/30/2018     Lab Results   Component Value Date    LDLCALC 97.8 06/01/2019    LDLCALC 49.2 (L) 03/20/2019    LDLCALC 129.4 11/30/2018     Lab Results   Component Value Date    TRIG 151 (H) 06/01/2019    TRIG 104 03/20/2019    TRIG 118 11/30/2018     Lab Results   Component Value Date    CHOLHDL 24.7 06/01/2019    CHOLHDL 39.7 03/20/2019    CHOLHDL 23.9 11/30/2018     Lab Results   Component Value Date    TSH 0.358 (L) 06/01/2019     2016 u/s      2018 below      Lab Results   Component Value Date    MICALBCREAT 121.1 (H) 11/30/2018     Vit D, 25-Hydroxy   Date Value Ref Range Status   02/26/2019 32 30 - 96 ng/mL Final     Comment:     Vitamin D deficiency.........<10 ng/mL                              Vitamin D insufficiency......10-29 ng/mL       Vitamin D sufficiency........> or equal to 30 ng/mL  Vitamin D toxicity............>100 ng/mL       PHYSICAL EXAMINATION  Constitutional: middle aged female, appears well, no distress  Neck: Supple, trachea midline.   Respiratory: even and unlabored, CTA without wheezes.  Cardiovascular: RRR; no carotid bruits or murmurs.   Lymph: DP pulses  2+ bilaterally; 1+ edema.   Skin: warm and dry; no acanthosis nigracans observed.  Neuro: patient alert and cooperative; CN 2-12 grossly intact  Feet: appropriate footwear    Assessment/Plan    1. Thyroid nodule     2. Hypothyroidism, unspecified type  T3    T4, free    TSH   3. Type 2 diabetes mellitus without complication, without long-term current use of insulin  Hemoglobin A1c    GlycoMark (TM)    Fructosamine    Comprehensive  metabolic panel    Lipid panel   4. Essential hypertension     5. Pure hypercholesterolemia  Lipid panel   6. Hypovitaminosis D     7. NICOLE (obstructive sleep apnea)     8. Hyperparathyroidism  PTH, intact    Calcium, ionized    Magnesium   9. Gout, unspecified cause, unspecified chronicity, unspecified site  Uric acid      Thyroid nodule-repeat thyroid u/s 6/20  Hypothyroidism-continue LT4 dose. TFTs wnl.  Q4WI-N9h has decreased. Continue current regimen.  HTN-elevated-Continue ARB. Send bp log in one week.  HLD-stable-statin intolerant. Pt insists that it will decrease since she has changed her diet.  Hypovitaminosis D- vd is normal. Continue ergo.  MXT-spmwen-yjzofscs CPAP  Hyperparathryoidism-stable-monitor. Avoid calcium supplements. Increase intake of water. Calcium is normal. Will obtain renal stone CT from PCP done in January.  Gout-check uric acid next time    FOLLOW UP  3 mths

## 2019-06-06 LAB — GLYCOMARK (TM): 21.2 UG/ML

## 2019-06-10 ENCOUNTER — TELEPHONE (OUTPATIENT)
Dept: ENDOCRINOLOGY | Facility: CLINIC | Age: 69
End: 2019-06-10

## 2019-06-10 NOTE — TELEPHONE ENCOUNTER
----- Message from DEEJAY Adams PA-C sent at 6/7/2019  6:33 PM CDT -----  Please have pt return for bp check.

## 2019-08-13 DIAGNOSIS — E55.9 HYPOVITAMINOSIS D: ICD-10-CM

## 2019-08-13 RX ORDER — ERGOCALCIFEROL 1.25 MG/1
CAPSULE ORAL
Qty: 36 CAPSULE | Refills: 1 | Status: SHIPPED | OUTPATIENT
Start: 2019-08-13 | End: 2020-01-03

## 2019-09-11 ENCOUNTER — LAB VISIT (OUTPATIENT)
Dept: LAB | Facility: HOSPITAL | Age: 69
End: 2019-09-11
Attending: PHYSICIAN ASSISTANT
Payer: MEDICARE

## 2019-09-11 DIAGNOSIS — E21.3 HYPERPARATHYROIDISM: ICD-10-CM

## 2019-09-11 DIAGNOSIS — E11.9 TYPE 2 DIABETES MELLITUS WITHOUT COMPLICATION, WITHOUT LONG-TERM CURRENT USE OF INSULIN: ICD-10-CM

## 2019-09-11 DIAGNOSIS — M10.9 GOUT, UNSPECIFIED CAUSE, UNSPECIFIED CHRONICITY, UNSPECIFIED SITE: ICD-10-CM

## 2019-09-11 DIAGNOSIS — E78.00 PURE HYPERCHOLESTEROLEMIA: ICD-10-CM

## 2019-09-11 DIAGNOSIS — E03.9 HYPOTHYROIDISM, UNSPECIFIED TYPE: ICD-10-CM

## 2019-09-11 LAB
ALBUMIN SERPL BCP-MCNC: 3.4 G/DL (ref 3.5–5.2)
ALP SERPL-CCNC: 98 U/L (ref 55–135)
ALT SERPL W/O P-5'-P-CCNC: 15 U/L (ref 10–44)
ANION GAP SERPL CALC-SCNC: 10 MMOL/L (ref 8–16)
AST SERPL-CCNC: 17 U/L (ref 10–40)
BILIRUB SERPL-MCNC: 0.3 MG/DL (ref 0.1–1)
BUN SERPL-MCNC: 19 MG/DL (ref 8–23)
CA-I BLDV-SCNC: 1.35 MMOL/L (ref 1.06–1.42)
CALCIUM SERPL-MCNC: 10.2 MG/DL (ref 8.7–10.5)
CHLORIDE SERPL-SCNC: 106 MMOL/L (ref 95–110)
CHOLEST SERPL-MCNC: 198 MG/DL (ref 120–199)
CHOLEST/HDLC SERPL: 3.8 {RATIO} (ref 2–5)
CO2 SERPL-SCNC: 25 MMOL/L (ref 23–29)
CREAT SERPL-MCNC: 0.9 MG/DL (ref 0.5–1.4)
EST. GFR  (AFRICAN AMERICAN): >60 ML/MIN/1.73 M^2
EST. GFR  (NON AFRICAN AMERICAN): >60 ML/MIN/1.73 M^2
ESTIMATED AVG GLUCOSE: 131 MG/DL (ref 68–131)
GLUCOSE SERPL-MCNC: 117 MG/DL (ref 70–110)
HBA1C MFR BLD HPLC: 6.2 % (ref 4–5.6)
HDLC SERPL-MCNC: 52 MG/DL (ref 40–75)
HDLC SERPL: 26.3 % (ref 20–50)
LDLC SERPL CALC-MCNC: 124.8 MG/DL (ref 63–159)
MAGNESIUM SERPL-MCNC: 1.8 MG/DL (ref 1.6–2.6)
NONHDLC SERPL-MCNC: 146 MG/DL
POTASSIUM SERPL-SCNC: 3.9 MMOL/L (ref 3.5–5.1)
PROT SERPL-MCNC: 7.4 G/DL (ref 6–8.4)
PTH-INTACT SERPL-MCNC: 107 PG/ML (ref 9–77)
SODIUM SERPL-SCNC: 141 MMOL/L (ref 136–145)
T3 SERPL-MCNC: 79 NG/DL (ref 60–180)
T4 FREE SERPL-MCNC: 0.99 NG/DL (ref 0.71–1.51)
TRIGL SERPL-MCNC: 106 MG/DL (ref 30–150)
TSH SERPL DL<=0.005 MIU/L-ACNC: 0.81 UIU/ML (ref 0.4–4)
URATE SERPL-MCNC: 6.5 MG/DL (ref 2.4–5.7)

## 2019-09-11 PROCEDURE — 80053 COMPREHEN METABOLIC PANEL: CPT

## 2019-09-11 PROCEDURE — 84480 ASSAY TRIIODOTHYRONINE (T3): CPT

## 2019-09-11 PROCEDURE — 83735 ASSAY OF MAGNESIUM: CPT

## 2019-09-11 PROCEDURE — 84550 ASSAY OF BLOOD/URIC ACID: CPT

## 2019-09-11 PROCEDURE — 84439 ASSAY OF FREE THYROXINE: CPT

## 2019-09-11 PROCEDURE — 83036 HEMOGLOBIN GLYCOSYLATED A1C: CPT | Mod: 59

## 2019-09-11 PROCEDURE — 84378 SUGARS SINGLE QUANT: CPT

## 2019-09-11 PROCEDURE — 82985 ASSAY OF GLYCATED PROTEIN: CPT

## 2019-09-11 PROCEDURE — 36415 COLL VENOUS BLD VENIPUNCTURE: CPT | Mod: PO

## 2019-09-11 PROCEDURE — 82330 ASSAY OF CALCIUM: CPT

## 2019-09-11 PROCEDURE — 83970 ASSAY OF PARATHORMONE: CPT

## 2019-09-11 PROCEDURE — 84443 ASSAY THYROID STIM HORMONE: CPT

## 2019-09-11 PROCEDURE — 80061 LIPID PANEL: CPT

## 2019-09-14 LAB — FRUCTOSAMINE SERPL-SCNC: 221 UMOL /L

## 2019-09-15 LAB — GLYCOMARK (TM): 20.5 UG/ML

## 2019-09-18 ENCOUNTER — OFFICE VISIT (OUTPATIENT)
Dept: ENDOCRINOLOGY | Facility: CLINIC | Age: 69
End: 2019-09-18
Payer: MEDICARE

## 2019-09-18 VITALS
BODY MASS INDEX: 45.99 KG/M2 | WEIGHT: 293 LBS | HEART RATE: 57 BPM | TEMPERATURE: 98 F | HEIGHT: 67 IN | SYSTOLIC BLOOD PRESSURE: 130 MMHG | DIASTOLIC BLOOD PRESSURE: 70 MMHG

## 2019-09-18 DIAGNOSIS — E78.00 PURE HYPERCHOLESTEROLEMIA: ICD-10-CM

## 2019-09-18 DIAGNOSIS — E04.1 THYROID NODULE: Primary | ICD-10-CM

## 2019-09-18 DIAGNOSIS — I10 ESSENTIAL HYPERTENSION: ICD-10-CM

## 2019-09-18 DIAGNOSIS — M10.9 GOUT, UNSPECIFIED CAUSE, UNSPECIFIED CHRONICITY, UNSPECIFIED SITE: ICD-10-CM

## 2019-09-18 DIAGNOSIS — E11.9 TYPE 2 DIABETES MELLITUS WITHOUT COMPLICATION, WITHOUT LONG-TERM CURRENT USE OF INSULIN: ICD-10-CM

## 2019-09-18 DIAGNOSIS — E21.3 HYPERPARATHYROIDISM: ICD-10-CM

## 2019-09-18 DIAGNOSIS — G47.33 OSA (OBSTRUCTIVE SLEEP APNEA): ICD-10-CM

## 2019-09-18 DIAGNOSIS — E55.9 HYPOVITAMINOSIS D: ICD-10-CM

## 2019-09-18 DIAGNOSIS — E03.9 HYPOTHYROIDISM, UNSPECIFIED TYPE: ICD-10-CM

## 2019-09-18 PROCEDURE — 99213 OFFICE O/P EST LOW 20 MIN: CPT | Mod: PBBFAC,PO | Performed by: PHYSICIAN ASSISTANT

## 2019-09-18 PROCEDURE — 99213 OFFICE O/P EST LOW 20 MIN: CPT | Mod: S$PBB,,, | Performed by: PHYSICIAN ASSISTANT

## 2019-09-18 PROCEDURE — 99999 PR PBB SHADOW E&M-EST. PATIENT-LVL III: CPT | Mod: PBBFAC,,, | Performed by: PHYSICIAN ASSISTANT

## 2019-09-18 PROCEDURE — 99999 PR PBB SHADOW E&M-EST. PATIENT-LVL III: ICD-10-PCS | Mod: PBBFAC,,, | Performed by: PHYSICIAN ASSISTANT

## 2019-09-18 PROCEDURE — 99213 PR OFFICE/OUTPT VISIT, EST, LEVL III, 20-29 MIN: ICD-10-PCS | Mod: S$PBB,,, | Performed by: PHYSICIAN ASSISTANT

## 2019-09-18 RX ORDER — IRBESARTAN 300 MG/1
TABLET ORAL
Qty: 90 TABLET | Refills: 3 | Status: SHIPPED | OUTPATIENT
Start: 2019-09-18 | End: 2019-12-16

## 2019-09-18 NOTE — PROGRESS NOTES
"CC: DM/thyroid nodule/hypothyroidism    HPI: Juhi Taylor was diagnosed with Type 2 DM ~15 years ago. No hospitalizations for DM. Her daughter has DM. No fhx of thyroid disease.     Hyperparathyroidism.   Occ nausea and abdominal pain. No vomiting. Kidney stones in 2015. She had a CT in 1/19 that was apparently normal.    CURRENT DIABETIC MEDS: glipizide 5 mg BID, metformin 1000 mg daily    Does not check glucose.   Hypoglycemia: None  Type of Glucose Meter: one touch verio    Physical Activity: none    Dietary Habits: 1-2 meal daily. Drinks water.     Last Eye Exam: 1/19  Last Podiatry Exam: 2/19    Last DM Education Attended: Last year    Thyroid nodule  Dx 5-10 years ago  Bx in 2015 of left thyroid nodule was benign  Last thyroid u/s 6/19 showed a dominant nodule 1.5 cm in the right lobe which could not be reached on previous attempt at FNA in 12//18 . Left lobe has a 2.4 cm nodule that has not changed since the prior exam.  No new voice changes. + dysphagia and SOB.     Hypothyroidism  Dx 10 years ago  LT4 50 mcg qd  +  hair loss (hereditary), brittle nails, constipation (chronic)  No h/c intolerance, palpitations, insomnia    NICOLE-wears cpap    Social Hx: smoked 1 ppd for 40 years. She quit 2 years ago.  No ETOH use.     DEXA 2/18: Osteopenia. Taking ergo 3x weekly.    REVIEW OF SYSTEMS,  General: + postnasal drip, no weakness or fatigue, wt gain.   Eyes: no intermittent blurry vision or visual disturbances.   Cardiac: no chest pain or palpitations.   Respiratory: no cough or dyspnea.   GI: no abdominal pain or nausea.   Skin: no rashes or itching.   Neuro: no numbness or tingling.   Musc: + back pain, leg pain  Endocrine: no polyuria, polydipsia, polyphagia.   Remainder ROS negative    Vital Signs  /70 (BP Location: Right arm, Patient Position: Sitting, BP Method: Medium (Manual))   Pulse (!) 57   Temp 98.1 °F (36.7 °C) (Oral)   Ht 5' 7" (1.702 m)   Wt (!) 150.1 kg (331 lb 0.3 oz)   BMI 51.85 " kg/m²     Personally reviewed labs below with pt:     Hemoglobin A1C   Date Value Ref Range Status   09/11/2019 6.2 (H) 4.0 - 5.6 % Final     Comment:     ADA Screening Guidelines:  5.7-6.4%  Consistent with prediabetes  >or=6.5%  Consistent with diabetes  High levels of fetal hemoglobin interfere with the HbA1C  assay. Heterozygous hemoglobin variants (HbS, HgC, etc)do  not significantly interfere with this assay.   However, presence of multiple variants may affect accuracy.     06/01/2019 5.6 4.0 - 5.6 % Final     Comment:     ADA Screening Guidelines:  5.7-6.4%  Consistent with prediabetes  >or=6.5%  Consistent with diabetes  High levels of fetal hemoglobin interfere with the HbA1C  assay. Heterozygous hemoglobin variants (HbS, HgC, etc)do  not significantly interfere with this assay.   However, presence of multiple variants may affect accuracy.     02/26/2019 6.6 (H) 4.0 - 5.6 % Final     Comment:     ADA Screening Guidelines:  5.7-6.4%  Consistent with prediabetes  >or=6.5%  Consistent with diabetes  High levels of fetal hemoglobin interfere with the HbA1C  assay. Heterozygous hemoglobin variants (HbS, HgC, etc)do  not significantly interfere with this assay.   However, presence of multiple variants may affect accuracy.         Chemistry        Component Value Date/Time     09/11/2019 1103    K 3.9 09/11/2019 1103     09/11/2019 1103    CO2 25 09/11/2019 1103    BUN 19 09/11/2019 1103    CREATININE 0.9 09/11/2019 1103     (H) 09/11/2019 1103        Component Value Date/Time    CALCIUM 10.2 09/11/2019 1103    ALKPHOS 98 09/11/2019 1103    AST 17 09/11/2019 1103    ALT 15 09/11/2019 1103    BILITOT 0.3 09/11/2019 1103    ESTGFRAFRICA >60.0 09/11/2019 1103    EGFRNONAA >60.0 09/11/2019 1103        Lab Results   Component Value Date    CHOL 198 09/11/2019    CHOL 170 06/01/2019    CHOL 116 (L) 03/20/2019     Lab Results   Component Value Date    HDL 52 09/11/2019    HDL 42 06/01/2019    HDL 46  03/20/2019     Lab Results   Component Value Date    LDLCALC 124.8 09/11/2019    LDLCALC 97.8 06/01/2019    LDLCALC 49.2 (L) 03/20/2019     Lab Results   Component Value Date    TRIG 106 09/11/2019    TRIG 151 (H) 06/01/2019    TRIG 104 03/20/2019     Lab Results   Component Value Date    CHOLHDL 26.3 09/11/2019    CHOLHDL 24.7 06/01/2019    CHOLHDL 39.7 03/20/2019     Lab Results   Component Value Date    TSH 0.808 09/11/2019     2016 u/s      2018 below      Lab Results   Component Value Date    MICALBCREAT 121.1 (H) 11/30/2018     Vit D, 25-Hydroxy   Date Value Ref Range Status   02/26/2019 32 30 - 96 ng/mL Final     Comment:     Vitamin D deficiency.........<10 ng/mL                              Vitamin D insufficiency......10-29 ng/mL       Vitamin D sufficiency........> or equal to 30 ng/mL  Vitamin D toxicity............>100 ng/mL       PHYSICAL EXAMINATION  Constitutional: middle aged female, appears well, no distress  Neck: Supple, trachea midline.   Respiratory: even and unlabored, CTAB without wheezes.  Cardiovascular: RRR; no carotid bruits or murmurs.   Lymph: DP pulses  2+ bilaterally; 1+ edema bl  Skin: warm and dry; no acanthosis nigracans observed.  Neuro: patient alert and cooperative; CN 2-12 grossly intact  Psych: normal mood and affect  Feet: appropriate footwear    Assessment/Plan    1. Thyroid nodule     2. Hypothyroidism, unspecified type  TSH    T4, free    T3    Microalbumin/creatinine urine ratio   3. Type 2 diabetes mellitus without complication, without long-term current use of insulin  Hemoglobin A1c    Renal function panel   4. Essential hypertension  irbesartan (AVAPRO) 300 MG tablet   5. Pure hypercholesterolemia  Lipid panel   6. Hypovitaminosis D     7. NICOLE (obstructive sleep apnea)     8. Hyperparathyroidism  PTH, intact    Calcium, ionized   9. Gout, unspecified cause, unspecified chronicity, unspecified site        Thyroid nodule-nodules have been stable in size-repeat thyroid  u/s 6/20. Pt declines surgery.  Hcakmrxrwgcvub-ebqizr-mzzidauu LT4 dose. TFTs wnl.  W6BY-J1o has increased. Continue current regimen. Pt plans to exercise.  RLA-fndwvl-Fvgtcpdt ARB.   HLD-stable-statin intolerant. Pt plans to exercise and  change her diet.  Hypovitaminosis D- vd is normal. Continue ergo.  WLR-jiwvpi-ikmujhpy CPAP  Hyperparathryoidism-stable-monitor. Avoid calcium supplements. Increase intake of water. Calcium is normal. Will obtain renal stone CT from PCP done in January.  Gout-elevated-continue allopurinol. Increase intake of water.    FOLLOW UP  3 mths

## 2019-12-04 ENCOUNTER — TELEPHONE (OUTPATIENT)
Dept: ENDOCRINOLOGY | Facility: CLINIC | Age: 69
End: 2019-12-04

## 2019-12-04 DIAGNOSIS — E08.40 DIABETES MELLITUS DUE TO UNDERLYING CONDITION WITH DIABETIC NEUROPATHY, WITHOUT LONG-TERM CURRENT USE OF INSULIN: ICD-10-CM

## 2019-12-04 DIAGNOSIS — E55.9 HYPOVITAMINOSIS D: Primary | ICD-10-CM

## 2019-12-04 NOTE — TELEPHONE ENCOUNTER
----- Message from Brittney Gotti sent at 12/4/2019 11:31 AM CST -----  Contact: self   Patient want to know if you can add vitamin d to blood work patient also want to be tested for mode please add to blood work for December 9th, any questions please call back at [phone]

## 2019-12-09 ENCOUNTER — LAB VISIT (OUTPATIENT)
Dept: LAB | Facility: HOSPITAL | Age: 69
End: 2019-12-09
Attending: PHYSICIAN ASSISTANT
Payer: MEDICARE

## 2019-12-09 DIAGNOSIS — E78.00 PURE HYPERCHOLESTEROLEMIA: ICD-10-CM

## 2019-12-09 DIAGNOSIS — E11.9 TYPE 2 DIABETES MELLITUS WITHOUT COMPLICATION, WITHOUT LONG-TERM CURRENT USE OF INSULIN: ICD-10-CM

## 2019-12-09 DIAGNOSIS — E55.9 HYPOVITAMINOSIS D: ICD-10-CM

## 2019-12-09 DIAGNOSIS — E21.3 HYPERPARATHYROIDISM: ICD-10-CM

## 2019-12-09 DIAGNOSIS — E03.9 HYPOTHYROIDISM, UNSPECIFIED TYPE: ICD-10-CM

## 2019-12-09 LAB
25(OH)D3+25(OH)D2 SERPL-MCNC: 33 NG/ML (ref 30–96)
ALBUMIN SERPL BCP-MCNC: 3.1 G/DL (ref 3.5–5.2)
ANION GAP SERPL CALC-SCNC: 11 MMOL/L (ref 8–16)
BUN SERPL-MCNC: 16 MG/DL (ref 8–23)
CA-I BLDV-SCNC: 1.26 MMOL/L (ref 1.06–1.42)
CALCIUM SERPL-MCNC: 9.8 MG/DL (ref 8.7–10.5)
CHLORIDE SERPL-SCNC: 105 MMOL/L (ref 95–110)
CHOLEST SERPL-MCNC: 212 MG/DL (ref 120–199)
CHOLEST/HDLC SERPL: 4.3 {RATIO} (ref 2–5)
CO2 SERPL-SCNC: 24 MMOL/L (ref 23–29)
CREAT SERPL-MCNC: 0.9 MG/DL (ref 0.5–1.4)
EST. GFR  (AFRICAN AMERICAN): >60 ML/MIN/1.73 M^2
EST. GFR  (NON AFRICAN AMERICAN): >60 ML/MIN/1.73 M^2
ESTIMATED AVG GLUCOSE: 137 MG/DL (ref 68–131)
GLUCOSE SERPL-MCNC: 139 MG/DL (ref 70–110)
HBA1C MFR BLD HPLC: 6.4 % (ref 4–5.6)
HDLC SERPL-MCNC: 49 MG/DL (ref 40–75)
HDLC SERPL: 23.1 % (ref 20–50)
LDLC SERPL CALC-MCNC: 133.4 MG/DL (ref 63–159)
NONHDLC SERPL-MCNC: 163 MG/DL
PHOSPHATE SERPL-MCNC: 3 MG/DL (ref 2.7–4.5)
POTASSIUM SERPL-SCNC: 4.2 MMOL/L (ref 3.5–5.1)
PTH-INTACT SERPL-MCNC: 169 PG/ML (ref 9–77)
SODIUM SERPL-SCNC: 140 MMOL/L (ref 136–145)
T3 SERPL-MCNC: 84 NG/DL (ref 60–180)
T4 FREE SERPL-MCNC: 0.97 NG/DL (ref 0.71–1.51)
TRIGL SERPL-MCNC: 148 MG/DL (ref 30–150)
TSH SERPL DL<=0.005 MIU/L-ACNC: 0.91 UIU/ML (ref 0.4–4)

## 2019-12-09 PROCEDURE — 80069 RENAL FUNCTION PANEL: CPT

## 2019-12-09 PROCEDURE — 82330 ASSAY OF CALCIUM: CPT

## 2019-12-09 PROCEDURE — 84480 ASSAY TRIIODOTHYRONINE (T3): CPT

## 2019-12-09 PROCEDURE — 84439 ASSAY OF FREE THYROXINE: CPT

## 2019-12-09 PROCEDURE — 80061 LIPID PANEL: CPT

## 2019-12-09 PROCEDURE — 83970 ASSAY OF PARATHORMONE: CPT

## 2019-12-09 PROCEDURE — 83036 HEMOGLOBIN GLYCOSYLATED A1C: CPT

## 2019-12-09 PROCEDURE — 36415 COLL VENOUS BLD VENIPUNCTURE: CPT | Mod: PO

## 2019-12-09 PROCEDURE — 82306 VITAMIN D 25 HYDROXY: CPT

## 2019-12-09 PROCEDURE — 84443 ASSAY THYROID STIM HORMONE: CPT

## 2019-12-11 ENCOUNTER — OFFICE VISIT (OUTPATIENT)
Dept: OTOLARYNGOLOGY | Facility: CLINIC | Age: 69
End: 2019-12-11
Payer: MEDICARE

## 2019-12-11 VITALS — BODY MASS INDEX: 45.99 KG/M2 | HEIGHT: 67 IN | WEIGHT: 293 LBS

## 2019-12-11 DIAGNOSIS — J01.00 ACUTE MAXILLARY SINUSITIS, RECURRENCE NOT SPECIFIED: Primary | ICD-10-CM

## 2019-12-11 DIAGNOSIS — Z98.890 S/P FESS (FUNCTIONAL ENDOSCOPIC SINUS SURGERY): ICD-10-CM

## 2019-12-11 DIAGNOSIS — J32.0 CHRONIC LEFT MAXILLARY SINUSITIS: ICD-10-CM

## 2019-12-11 DIAGNOSIS — K21.9 LPRD (LARYNGOPHARYNGEAL REFLUX DISEASE): ICD-10-CM

## 2019-12-11 DIAGNOSIS — K21.9 GASTROESOPHAGEAL REFLUX DISEASE, ESOPHAGITIS PRESENCE NOT SPECIFIED: ICD-10-CM

## 2019-12-11 PROCEDURE — 31575 DIAGNOSTIC LARYNGOSCOPY: CPT | Mod: PBBFAC,PO | Performed by: NURSE PRACTITIONER

## 2019-12-11 PROCEDURE — 1159F PR MEDICATION LIST DOCUMENTED IN MEDICAL RECORD: ICD-10-PCS | Mod: ,,, | Performed by: NURSE PRACTITIONER

## 2019-12-11 PROCEDURE — 1126F AMNT PAIN NOTED NONE PRSNT: CPT | Mod: ,,, | Performed by: NURSE PRACTITIONER

## 2019-12-11 PROCEDURE — 99999 PR PBB SHADOW E&M-EST. PATIENT-LVL IV: CPT | Mod: PBBFAC,,, | Performed by: NURSE PRACTITIONER

## 2019-12-11 PROCEDURE — 99999 PR PBB SHADOW E&M-EST. PATIENT-LVL IV: ICD-10-PCS | Mod: PBBFAC,,, | Performed by: NURSE PRACTITIONER

## 2019-12-11 PROCEDURE — 99204 PR OFFICE/OUTPT VISIT, NEW, LEVL IV, 45-59 MIN: ICD-10-PCS | Mod: 25,S$PBB,, | Performed by: NURSE PRACTITIONER

## 2019-12-11 PROCEDURE — 99214 OFFICE O/P EST MOD 30 MIN: CPT | Mod: PBBFAC,PO,25 | Performed by: NURSE PRACTITIONER

## 2019-12-11 PROCEDURE — 99204 OFFICE O/P NEW MOD 45 MIN: CPT | Mod: 25,S$PBB,, | Performed by: NURSE PRACTITIONER

## 2019-12-11 PROCEDURE — 1159F MED LIST DOCD IN RCRD: CPT | Mod: ,,, | Performed by: NURSE PRACTITIONER

## 2019-12-11 PROCEDURE — 87070 CULTURE OTHR SPECIMN AEROBIC: CPT

## 2019-12-11 PROCEDURE — 1126F PR PAIN SEVERITY QUANTIFIED, NO PAIN PRESENT: ICD-10-PCS | Mod: ,,, | Performed by: NURSE PRACTITIONER

## 2019-12-11 PROCEDURE — 31575 DIAGNOSTIC LARYNGOSCOPY: CPT | Mod: S$PBB,,, | Performed by: NURSE PRACTITIONER

## 2019-12-11 PROCEDURE — 31575 PR LARYNGOSCOPY, FLEXIBLE; DIAGNOSTIC: ICD-10-PCS | Mod: S$PBB,,, | Performed by: NURSE PRACTITIONER

## 2019-12-11 RX ORDER — FLUCONAZOLE 150 MG/1
TABLET ORAL
Status: ON HOLD | COMMUNITY
Start: 2019-11-14 | End: 2020-12-05 | Stop reason: HOSPADM

## 2019-12-11 RX ORDER — CEFUROXIME AXETIL 250 MG/1
TABLET ORAL
COMMUNITY
Start: 2019-11-14 | End: 2020-11-20 | Stop reason: ALTCHOICE

## 2019-12-11 RX ORDER — NYSTATIN AND TRIAMCINOLONE ACETONIDE 100000; 1 [USP'U]/G; MG/G
CREAM TOPICAL
COMMUNITY
Start: 2019-10-02 | End: 2020-11-30 | Stop reason: CLARIF

## 2019-12-11 RX ORDER — PROMETHAZINE HYDROCHLORIDE 25 MG/1
TABLET ORAL
COMMUNITY
End: 2019-12-16

## 2019-12-11 RX ORDER — THYROID 60 MG/1
TABLET ORAL
COMMUNITY
End: 2019-12-16

## 2019-12-11 NOTE — PROGRESS NOTES
Subjective:       Patient ID: Juhi Taylor is a 69 y.o. female.    Chief Complaint: No chief complaint on file.    HPI   Patient is new to ENT, self-referred for epistaxis. No blood thinners. No nasal sprays. Last epistaxis was 2 weeks ago. Patient had FESS two years ago and has not been able to clear sinus infection in over two years. She states the culture from surgery (FESS) showed multiple organisms including bacteria and fungus. She was given compounded preparation through [facility] which she did through nasal lavage for six months, but infection never cleared.     Review of Systems   Constitutional: Negative.    HENT: Positive for congestion, postnasal drip, rhinorrhea, sinus pain, sore throat (frequently irritated), trouble swallowing and voice change. Negative for facial swelling and sneezing.         Itchy nose  Frequent throat clearing  Sensation of thick or too much mucus in the back of her throat   Eyes: Positive for discharge and itching.   Respiratory: Positive for cough (productive). Negative for choking.    Cardiovascular: Negative.    Gastrointestinal: Negative.    Musculoskeletal: Negative.    Skin: Negative.    Neurological: Negative.  Negative for headaches.   Hematological: Negative.    Psychiatric/Behavioral: Negative.        Objective:      Physical Exam   Constitutional: She is oriented to person, place, and time. Vital signs are normal. She appears well-developed and well-nourished. She is cooperative. She does not appear ill. No distress.   morbidly obese   HENT:   Head: Normocephalic and atraumatic.   Right Ear: Hearing, tympanic membrane, external ear and ear canal normal. Tympanic membrane is not erythematous. No middle ear effusion.   Left Ear: Hearing, tympanic membrane, external ear and ear canal normal. Tympanic membrane is not erythematous.  No middle ear effusion.   Nose: Mucosal edema and rhinorrhea present.   Mouth/Throat: Uvula is midline, oropharynx is  clear and moist and mucous membranes are normal. Mucous membranes are not pale, not dry and not cyanotic. No oropharyngeal exudate, posterior oropharyngeal edema or posterior oropharyngeal erythema.   Eyes: Pupils are equal, round, and reactive to light. Conjunctivae, EOM and lids are normal. Right eye exhibits no discharge. Left eye exhibits no discharge. No scleral icterus.   Neck: Trachea normal and normal range of motion. Neck supple. No tracheal deviation present. No thyroid mass and no thyromegaly present.   Cardiovascular: Normal rate.   Pulmonary/Chest: Effort normal. No stridor. No respiratory distress. She has no wheezes.   Musculoskeletal: Normal range of motion.   Lymphadenopathy:        Head (right side): No submental, no submandibular, no tonsillar, no preauricular and no posterior auricular adenopathy present.        Head (left side): No submental, no submandibular, no tonsillar, no preauricular and no posterior auricular adenopathy present.     She has no cervical adenopathy.        Right cervical: No superficial cervical and no posterior cervical adenopathy present.       Left cervical: No superficial cervical and no posterior cervical adenopathy present.   Neurological: She is alert and oriented to person, place, and time. She has normal strength. Coordination and gait normal.   Skin: Skin is warm, dry and intact. No lesion and no rash noted. She is not diaphoretic. No cyanosis. No pallor.   Psychiatric: She has a normal mood and affect. Her speech is normal and behavior is normal. Judgment and thought content normal. Cognition and memory are normal.   Nursing note and vitals reviewed.      Procedure: Flexible laryngoscopy    In order to fully examine the upper aerodigestive tract, including the larynx, in a patient with a hyperactive gag reflex, and suboptimal visualization with indirect mirror exam,  flexible endoscopy is required.   After explaining the procedure and obtaining verbal consent, a  timeout was performed with the patient's participation according to the universal protocol. Both nasal cavities were anesthetized with 4% Xylocaine spray mixed with Min-Synephrine. The flexible laryngoscope  was inserted into the nasal cavity and advanced to visualize the nasal cavity, nasopharynx, the posterior oropharynx, hypopharynx, and the endolarynx with the  findings noted. The scope was removed and the procedure terminated. The patient tolerated this procedure well without apparent complication.     OVERALL FINDINGS  Nasopharynx - the torus is clear. There are no lesions of the posterior wall.   Oropharynx - no lesions of the tongue base. There is no obvious fullness or asymmetry.  Hypopharynx - there are no lesions of the pyriform sinuses or postcricoid region   Larynx - there are no lesions of the supraglottic or glottic larynx.  Vocal fold mobility is normal.     SPECIFIC FINDINGS  Adenoid tissue - normal   Nasopharynx & eustachian tube orifices - normal   Posterior pharyngeal wall - normal   Base of tongue - normal   Epiglottis - normal   Valleculae - normal   Pyriform sinuses - normal   False vocal cords - normal   True vocal cords - normal  Arytenoids - normal   Interarytenoid space - marked edema, erythema  Left middle meatus with post-surgical changes    Left middle turbinate with post-surgical changes    Left maxillary antrostomy window with green and white mucopus    Right middle meatus post-surgical changes, no mucopus    Right maxillary antrostomy window without mucopus    Larynx with mucopus past the cords    Larynx (marked reflux changes)     Assessment:     Left maxillary sinusitis (photo #3)    S/P FESS (2 years ago, Dr. Juan Pablo Cleary)  Probable immune deficiency or allergic component  GERD/LPRD  Morbid obesity  Plan:     Culture taken from left middle meatus -- will call pt with results as soon as available   Pt states she does not have henrry in compounded preparations because she was on them  for six months straight without improvement after her FESS.  Culture from FESS showed multiple organisms including multiple strains of bacteria and fungus according to patient, so if today's culture shows same, compounded route may be what is needed.   I suspect she will need a full immune workup to determine whether IVIG therapy would be beneficial.   Once sinusitis is showing signs of improvement, we will need to turn our attention to her GERD/LPRD.

## 2019-12-14 LAB — BACTERIA SPEC AEROBE CULT: NORMAL

## 2019-12-16 ENCOUNTER — TELEPHONE (OUTPATIENT)
Dept: OTOLARYNGOLOGY | Facility: CLINIC | Age: 69
End: 2019-12-16

## 2019-12-16 ENCOUNTER — OFFICE VISIT (OUTPATIENT)
Dept: ENDOCRINOLOGY | Facility: CLINIC | Age: 69
End: 2019-12-16
Payer: MEDICARE

## 2019-12-16 VITALS
TEMPERATURE: 98 F | WEIGHT: 293 LBS | SYSTOLIC BLOOD PRESSURE: 144 MMHG | DIASTOLIC BLOOD PRESSURE: 88 MMHG | HEART RATE: 70 BPM | HEIGHT: 67 IN | BODY MASS INDEX: 45.99 KG/M2

## 2019-12-16 DIAGNOSIS — E03.9 HYPOTHYROIDISM, UNSPECIFIED TYPE: ICD-10-CM

## 2019-12-16 DIAGNOSIS — E11.9 TYPE 2 DIABETES MELLITUS WITHOUT COMPLICATION, WITHOUT LONG-TERM CURRENT USE OF INSULIN: ICD-10-CM

## 2019-12-16 DIAGNOSIS — E78.00 PURE HYPERCHOLESTEROLEMIA: ICD-10-CM

## 2019-12-16 DIAGNOSIS — I10 ESSENTIAL HYPERTENSION: ICD-10-CM

## 2019-12-16 DIAGNOSIS — E21.3 HYPERPARATHYROIDISM: ICD-10-CM

## 2019-12-16 DIAGNOSIS — R80.9 MICROALBUMINURIA: ICD-10-CM

## 2019-12-16 DIAGNOSIS — M10.9 GOUT, UNSPECIFIED CAUSE, UNSPECIFIED CHRONICITY, UNSPECIFIED SITE: ICD-10-CM

## 2019-12-16 DIAGNOSIS — E04.1 THYROID NODULE: Primary | ICD-10-CM

## 2019-12-16 DIAGNOSIS — G47.33 OSA (OBSTRUCTIVE SLEEP APNEA): ICD-10-CM

## 2019-12-16 PROCEDURE — 1125F PR PAIN SEVERITY QUANTIFIED, PAIN PRESENT: ICD-10-PCS | Mod: ,,, | Performed by: PHYSICIAN ASSISTANT

## 2019-12-16 PROCEDURE — 1159F PR MEDICATION LIST DOCUMENTED IN MEDICAL RECORD: ICD-10-PCS | Mod: ,,, | Performed by: PHYSICIAN ASSISTANT

## 2019-12-16 PROCEDURE — 99214 OFFICE O/P EST MOD 30 MIN: CPT | Mod: PBBFAC,PO | Performed by: PHYSICIAN ASSISTANT

## 2019-12-16 PROCEDURE — 1159F MED LIST DOCD IN RCRD: CPT | Mod: ,,, | Performed by: PHYSICIAN ASSISTANT

## 2019-12-16 PROCEDURE — 99214 OFFICE O/P EST MOD 30 MIN: CPT | Mod: S$PBB,,, | Performed by: PHYSICIAN ASSISTANT

## 2019-12-16 PROCEDURE — 99999 PR PBB SHADOW E&M-EST. PATIENT-LVL IV: ICD-10-PCS | Mod: PBBFAC,,, | Performed by: PHYSICIAN ASSISTANT

## 2019-12-16 PROCEDURE — 1125F AMNT PAIN NOTED PAIN PRSNT: CPT | Mod: ,,, | Performed by: PHYSICIAN ASSISTANT

## 2019-12-16 PROCEDURE — 99999 PR PBB SHADOW E&M-EST. PATIENT-LVL IV: CPT | Mod: PBBFAC,,, | Performed by: PHYSICIAN ASSISTANT

## 2019-12-16 PROCEDURE — 99214 PR OFFICE/OUTPT VISIT, EST, LEVL IV, 30-39 MIN: ICD-10-PCS | Mod: S$PBB,,, | Performed by: PHYSICIAN ASSISTANT

## 2019-12-16 RX ORDER — LISINOPRIL 40 MG/1
40 TABLET ORAL DAILY
Qty: 90 TABLET | Refills: 3 | Status: SHIPPED | OUTPATIENT
Start: 2019-12-16 | End: 2020-12-15

## 2019-12-16 NOTE — PROGRESS NOTES
"CC: DM/thyroid nodule/hypothyroidism    HPI: Juhi Taylor was diagnosed with Type 2 DM ~15 years ago. No hospitalizations for DM. Her daughter has DM. No fhx of thyroid disease.     States Avapro caused swelling and inability to urinate.     Hyperparathyroidism.   No n/v.  Occ abdominal pain. No vomiting. Kidney stones in 2015. She had a CT in 1/19 that was apparently normal.    CURRENT DIABETIC MEDS: glipizide 5 mg BID, metformin 1000 mg daily    Does not check glucose.   Hypoglycemia: None  Type of Glucose Meter: one touch verio    Physical Activity: none    Dietary Habits: 1-2 meal daily. Drinks water.     Last Eye Exam: 1/19  Last Podiatry Exam: 2/19    Last DM Education Attended: Last year    Thyroid nodule  Dx 5-10 years ago  Bx in 2015 of left thyroid nodule was benign  Last thyroid u/s 6/19 showed a dominant nodule 1.5 cm in the right lobe which could not be reached on previous attempt at FNA in 12//18 . Left lobe has a 2.4 cm nodule that has not changed since the prior exam.  No new voice changes, sob, dysphagia.     Hypothyroidism  Dx 10 years ago  LT4 50 mcg qd  +  hair loss (hereditary), brittle nails,   No h/c intolerance, palpitations, insomnia, constipation/diarrhea.     NICOLE-wears cpap    Social Hx: smoked 1 ppd for 40 years. She quit 2 years ago.  No ETOH use.     DEXA 2/18: Osteopenia. Taking ergo 3x weekly.    REVIEW OF SYSTEMS,  General: no weakness or fatigue, wt gain.   Eyes: no intermittent blurry vision or visual disturbances.   Cardiac: + leg swelling, no chest pain or palpitations.   Respiratory: no cough or dyspnea.   GI: no abdominal pain or nausea.   Skin: no rashes or itching.   Neuro: no numbness or tingling.   Musc: + back pain, leg pain  Endocrine: no polyuria, polydipsia, polyphagia.   Remainder ROS negative    Vital Signs  BP (!) 144/88 (BP Location: Right arm, Patient Position: Sitting, BP Method: Large (Manual))   Pulse 70   Temp 97.8 °F (36.6 °C) (Oral)   Ht 5' 7" " (1.702 m)   Wt (!) 150.5 kg (331 lb 14.4 oz)   BMI 51.98 kg/m²     Personally reviewed labs below with pt:     Hemoglobin A1C   Date Value Ref Range Status   12/09/2019 6.4 (H) 4.0 - 5.6 % Final     Comment:     ADA Screening Guidelines:  5.7-6.4%  Consistent with prediabetes  >or=6.5%  Consistent with diabetes  High levels of fetal hemoglobin interfere with the HbA1C  assay. Heterozygous hemoglobin variants (HbS, HgC, etc)do  not significantly interfere with this assay.   However, presence of multiple variants may affect accuracy.     09/11/2019 6.2 (H) 4.0 - 5.6 % Final     Comment:     ADA Screening Guidelines:  5.7-6.4%  Consistent with prediabetes  >or=6.5%  Consistent with diabetes  High levels of fetal hemoglobin interfere with the HbA1C  assay. Heterozygous hemoglobin variants (HbS, HgC, etc)do  not significantly interfere with this assay.   However, presence of multiple variants may affect accuracy.     06/01/2019 5.6 4.0 - 5.6 % Final     Comment:     ADA Screening Guidelines:  5.7-6.4%  Consistent with prediabetes  >or=6.5%  Consistent with diabetes  High levels of fetal hemoglobin interfere with the HbA1C  assay. Heterozygous hemoglobin variants (HbS, HgC, etc)do  not significantly interfere with this assay.   However, presence of multiple variants may affect accuracy.         Chemistry        Component Value Date/Time     12/09/2019 1134    K 4.2 12/09/2019 1134     12/09/2019 1134    CO2 24 12/09/2019 1134    BUN 16 12/09/2019 1134    CREATININE 0.9 12/09/2019 1134     (H) 12/09/2019 1134        Component Value Date/Time    CALCIUM 9.8 12/09/2019 1134    ALKPHOS 98 09/11/2019 1103    AST 17 09/11/2019 1103    ALT 15 09/11/2019 1103    BILITOT 0.3 09/11/2019 1103    ESTGFRAFRICA >60.0 12/09/2019 1134    EGFRNONAA >60.0 12/09/2019 1134        Lab Results   Component Value Date    CHOL 212 (H) 12/09/2019    CHOL 198 09/11/2019    CHOL 170 06/01/2019     Lab Results   Component Value  Date    HDL 49 12/09/2019    HDL 52 09/11/2019    HDL 42 06/01/2019     Lab Results   Component Value Date    LDLCALC 133.4 12/09/2019    LDLCALC 124.8 09/11/2019    LDLCALC 97.8 06/01/2019     Lab Results   Component Value Date    TRIG 148 12/09/2019    TRIG 106 09/11/2019    TRIG 151 (H) 06/01/2019     Lab Results   Component Value Date    CHOLHDL 23.1 12/09/2019    CHOLHDL 26.3 09/11/2019    CHOLHDL 24.7 06/01/2019     Lab Results   Component Value Date    TSH 0.912 12/09/2019     2016 u/s      2018 below      Lab Results   Component Value Date    MICALBCREAT 1944.6 (H) 12/09/2019     Vit D, 25-Hydroxy   Date Value Ref Range Status   12/09/2019 33 30 - 96 ng/mL Final     Comment:     Vitamin D deficiency.........<10 ng/mL                              Vitamin D insufficiency......10-29 ng/mL       Vitamin D sufficiency........> or equal to 30 ng/mL  Vitamin D toxicity............>100 ng/mL       PHYSICAL EXAMINATION  Constitutional: middle aged female, appears well, no distress  Neck: Supple, trachea midline.   Respiratory: even and unlabored, CTAB without wheezes.  Cardiovascular: RRR; no carotid bruits or murmurs.   Lymph: DP pulses  2+ bilaterally; 2+ edema bl  Skin: warm and dry; no acanthosis nigracans observed.  Neuro: patient alert and cooperative; CN 2-12 grossly intact  Abdomen: soft, nontender, non-distended. Obese abdomen.   Psych: normal mood and affect  Feet: appropriate footwear.    Assessment/Plan    1. Thyroid nodule     2. Hypothyroidism, unspecified type     3. Type 2 diabetes mellitus without complication, without long-term current use of insulin  Ambulatory referral to Nephrology   4. Essential hypertension     5. Pure hypercholesterolemia  alirocumab (PRALUENT PEN) 75 mg/mL PnIj   6. NICOLE (obstructive sleep apnea)     7. Hyperparathyroidism     8. Gout, unspecified cause, unspecified chronicity, unspecified site     9. Microalbuminuria  Ambulatory referral to Nephrology    US Retroperitoneal  Complete (Kidney and    Microalbumin/creatinine urine ratio    lisinopril (PRINIVIL,ZESTRIL) 40 MG tablet      Thyroid nodule-nodules have been stable in size-repeat thyroid u/s 6/20. Pt declines surgery.  Vvntzwllrlobla-xehqtt-jpjmpxvu LT4 dose. TFTs wnl.  Y8WV-M4e has increased. Continue current regimen. Pt plans to exercise.  HTN-stable-start Lisinopril 40 mg daily.   HLD-stable-statin intolerant. Start Praulent 75 mg q 2 weeks.   Hypovitaminosis D- vd is normal. Continue ergo.  ANP-rpswir-sayqtffc CPAP  Hyperparathryoidism-stable-monitor. Avoid calcium supplements. Increase intake of water. Calcium is normal. Will obtain renal stone CT from PCP done in January.  Gout-elevated-continue allopurinol. Increase intake of water. Recheck next visit.   Microalbuminuria-kidney u/s. Referral to nephrology. Repeat mac.     Additional labs approved by  to evaluate thyroid function, uric acid level, and evidence of renal disease.     FOLLOW UP  3 mths

## 2019-12-16 NOTE — TELEPHONE ENCOUNTER
----- Message from Sosa Ramachandran NP sent at 12/16/2019 12:22 PM CST -----  I called the patient twice today, at 8:30 AM and 12:20 PM, to collect more information regarding what recently oral antibiotics she has received in the past six months, and to discuss her CT scan from one year ago which Dr. Carlos & I reviewed together in clinic this morning.  
Pt is returning Sosa's call. Pt is now available to talk. Please call pt.   
Left:/eye

## 2019-12-17 ENCOUNTER — TELEPHONE (OUTPATIENT)
Dept: PHARMACY | Facility: CLINIC | Age: 69
End: 2019-12-17

## 2019-12-17 ENCOUNTER — HOSPITAL ENCOUNTER (OUTPATIENT)
Dept: RADIOLOGY | Facility: CLINIC | Age: 69
Discharge: HOME | End: 2019-12-17
Attending: PHYSICIAN ASSISTANT
Payer: MEDICARE

## 2019-12-17 DIAGNOSIS — R80.9 MICROALBUMINURIA: ICD-10-CM

## 2019-12-17 DIAGNOSIS — J32.0 LEFT MAXILLARY SINUSITIS: Primary | ICD-10-CM

## 2019-12-17 PROCEDURE — 76770 US EXAM ABDO BACK WALL COMP: CPT | Mod: 26,,, | Performed by: RADIOLOGY

## 2019-12-17 PROCEDURE — 76770 US RETROPERITONEAL COMPLETE: ICD-10-PCS | Mod: 26,,, | Performed by: RADIOLOGY

## 2019-12-17 PROCEDURE — 76770 US EXAM ABDO BACK WALL COMP: CPT | Mod: TC,PO

## 2019-12-17 RX ORDER — AMOXICILLIN AND CLAVULANATE POTASSIUM 875; 125 MG/1; MG/1
1 TABLET, FILM COATED ORAL 2 TIMES DAILY
Qty: 30 TABLET | Refills: 0 | Status: SHIPPED | OUTPATIENT
Start: 2019-12-17 | End: 2020-01-01

## 2019-12-17 NOTE — TELEPHONE ENCOUNTER
Informed Patient  that [facility] received prescription for Praluent and benefits investigation is required.  [facility] will be back in touch once insurance determination is received.

## 2020-01-03 DIAGNOSIS — E55.9 HYPOVITAMINOSIS D: ICD-10-CM

## 2020-01-03 RX ORDER — ERGOCALCIFEROL 1.25 MG/1
CAPSULE ORAL
Qty: 36 CAPSULE | Refills: 1 | Status: SHIPPED | OUTPATIENT
Start: 2020-01-03 | End: 2020-05-12

## 2020-01-05 ENCOUNTER — TELEPHONE (OUTPATIENT)
Dept: ENDOCRINOLOGY | Facility: CLINIC | Age: 70
End: 2020-01-05

## 2020-01-05 NOTE — TELEPHONE ENCOUNTER
----- Message from Cara Leger RN sent at 1/3/2020  4:04 PM CST -----  Regarding: FW: Repatha preferred medication with [employer]      ----- Message -----  From: Ben Salter  Sent: 1/3/2020   3:22 PM CST  To: DEEJAY Adams PA-C, Angie Reynoso Staff  Subject: Repatha preferred medication with [employer] Med#    Dr. Adams and staff,    Ms. Taylor's insurance will now only cover Repatha Sureclick or Repatha Pushtronex: unless patient has tried and failed Praluent, or if Praluent is contraindicated, please advise    If you would like to change to Repatha, please send over a new prescription for Repatha to [facility] and we will get it submit.    Thank you,   Ben  [phone]

## 2020-01-06 ENCOUNTER — TELEPHONE (OUTPATIENT)
Dept: PHARMACY | Facility: CLINIC | Age: 70
End: 2020-01-06

## 2020-01-06 NOTE — TELEPHONE ENCOUNTER
LVM for callback to inform patient that [facility] received prescription for REPATHA and prior authorization is required.  [facility] will be back in touch once insurance determination is received.

## 2020-01-31 ENCOUNTER — LAB VISIT (OUTPATIENT)
Dept: LAB | Facility: HOSPITAL | Age: 70
End: 2020-01-31
Attending: INTERNAL MEDICINE
Payer: MEDICARE

## 2020-01-31 DIAGNOSIS — R25.2 CRAMP OF LIMB: Primary | ICD-10-CM

## 2020-01-31 DIAGNOSIS — R80.9 PROTEINURIA: ICD-10-CM

## 2020-01-31 LAB
BASOPHILS # BLD AUTO: 0.03 K/UL (ref 0–0.2)
BASOPHILS NFR BLD: 0.4 % (ref 0–1.9)
DIFFERENTIAL METHOD: ABNORMAL
EOSINOPHIL # BLD AUTO: 0.3 K/UL (ref 0–0.5)
EOSINOPHIL NFR BLD: 3.3 % (ref 0–8)
ERYTHROCYTE [DISTWIDTH] IN BLOOD BY AUTOMATED COUNT: 15 % (ref 11.5–14.5)
HCT VFR BLD AUTO: 42.2 % (ref 37–48.5)
HGB BLD-MCNC: 12.1 G/DL (ref 12–16)
IMM GRANULOCYTES # BLD AUTO: 0.03 K/UL (ref 0–0.04)
IMM GRANULOCYTES NFR BLD AUTO: 0.4 % (ref 0–0.5)
LYMPHOCYTES # BLD AUTO: 2.9 K/UL (ref 1–4.8)
LYMPHOCYTES NFR BLD: 35.1 % (ref 18–48)
MCH RBC QN AUTO: 25.3 PG (ref 27–31)
MCHC RBC AUTO-ENTMCNC: 28.7 G/DL (ref 32–36)
MCV RBC AUTO: 88 FL (ref 82–98)
MONOCYTES # BLD AUTO: 0.6 K/UL (ref 0.3–1)
MONOCYTES NFR BLD: 7.7 % (ref 4–15)
NEUTROPHILS # BLD AUTO: 4.3 K/UL (ref 1.8–7.7)
NEUTROPHILS NFR BLD: 53.1 % (ref 38–73)
NRBC BLD-RTO: 0 /100 WBC
PLATELET # BLD AUTO: 311 K/UL (ref 150–350)
PMV BLD AUTO: 9.7 FL (ref 9.2–12.9)
RBC # BLD AUTO: 4.78 M/UL (ref 4–5.4)
WBC # BLD AUTO: 8.15 K/UL (ref 3.9–12.7)

## 2020-01-31 PROCEDURE — 86038 ANTINUCLEAR ANTIBODIES: CPT

## 2020-01-31 PROCEDURE — 82550 ASSAY OF CK (CPK): CPT

## 2020-01-31 PROCEDURE — 83540 ASSAY OF IRON: CPT

## 2020-01-31 PROCEDURE — 36415 COLL VENOUS BLD VENIPUNCTURE: CPT | Mod: PO

## 2020-01-31 PROCEDURE — 82552 ASSAY OF CPK IN BLOOD: CPT

## 2020-01-31 PROCEDURE — 80069 RENAL FUNCTION PANEL: CPT

## 2020-01-31 PROCEDURE — 80074 ACUTE HEPATITIS PANEL: CPT

## 2020-01-31 PROCEDURE — 84165 PROTEIN E-PHORESIS SERUM: CPT

## 2020-01-31 PROCEDURE — 84165 PATHOLOGIST INTERPRETATION SPE: ICD-10-PCS | Mod: 26,,, | Performed by: PATHOLOGY

## 2020-01-31 PROCEDURE — 86431 RHEUMATOID FACTOR QUANT: CPT

## 2020-01-31 PROCEDURE — 82728 ASSAY OF FERRITIN: CPT

## 2020-01-31 PROCEDURE — 85025 COMPLETE CBC W/AUTO DIFF WBC: CPT

## 2020-01-31 PROCEDURE — 83735 ASSAY OF MAGNESIUM: CPT

## 2020-01-31 PROCEDURE — 84165 PROTEIN E-PHORESIS SERUM: CPT | Mod: 26,,, | Performed by: PATHOLOGY

## 2020-02-01 LAB
ALBUMIN SERPL BCP-MCNC: 3.3 G/DL (ref 3.5–5.2)
ANION GAP SERPL CALC-SCNC: 9 MMOL/L (ref 8–16)
BUN SERPL-MCNC: 22 MG/DL (ref 8–23)
CALCIUM SERPL-MCNC: 10.1 MG/DL (ref 8.7–10.5)
CHLORIDE SERPL-SCNC: 105 MMOL/L (ref 95–110)
CK SERPL-CCNC: 93 U/L (ref 20–180)
CO2 SERPL-SCNC: 23 MMOL/L (ref 23–29)
CREAT SERPL-MCNC: 1 MG/DL (ref 0.5–1.4)
EST. GFR  (AFRICAN AMERICAN): >60 ML/MIN/1.73 M^2
EST. GFR  (NON AFRICAN AMERICAN): 58.4 ML/MIN/1.73 M^2
FERRITIN SERPL-MCNC: 162 NG/ML (ref 20–300)
GLUCOSE SERPL-MCNC: 93 MG/DL (ref 70–110)
IRON SERPL-MCNC: 39 UG/DL (ref 30–160)
MAGNESIUM SERPL-MCNC: 1.8 MG/DL (ref 1.6–2.6)
PHOSPHATE SERPL-MCNC: 2.9 MG/DL (ref 2.7–4.5)
POTASSIUM SERPL-SCNC: 4.5 MMOL/L (ref 3.5–5.1)
SATURATED IRON: 9 % (ref 20–50)
SODIUM SERPL-SCNC: 137 MMOL/L (ref 136–145)
TOTAL IRON BINDING CAPACITY: 420 UG/DL (ref 250–450)
TRANSFERRIN SERPL-MCNC: 284 MG/DL (ref 200–375)

## 2020-02-03 LAB
ALBUMIN SERPL ELPH-MCNC: 3.57 G/DL (ref 3.35–5.55)
ALPHA1 GLOB SERPL ELPH-MCNC: 0.29 G/DL (ref 0.17–0.41)
ALPHA2 GLOB SERPL ELPH-MCNC: 0.95 G/DL (ref 0.43–0.99)
B-GLOBULIN SERPL ELPH-MCNC: 1 G/DL (ref 0.5–1.1)
GAMMA GLOB SERPL ELPH-MCNC: 0.88 G/DL (ref 0.67–1.58)
HAV IGM SERPL QL IA: NEGATIVE
HBV CORE IGM SERPL QL IA: NEGATIVE
HBV SURFACE AG SERPL QL IA: NEGATIVE
HCV AB SERPL QL IA: NEGATIVE
PROT SERPL-MCNC: 6.7 G/DL (ref 6–8.4)
RHEUMATOID FACT SERPL-ACNC: <10 IU/ML (ref 0–15)

## 2020-02-04 LAB
ANA SER QL IF: NORMAL
CK BB CFR SERPL ELPH: 0 %
CK MB CFR SERPL ELPH: 0 % (ref 0–3.3)
CK MM CFR SERPL ELPH: 100 % (ref 96.7–100)
CK SERPL-CCNC: 84 U/L (ref 30–223)
PATHOLOGIST INTERPRETATION SPE: NORMAL

## 2020-03-09 ENCOUNTER — LAB VISIT (OUTPATIENT)
Dept: LAB | Facility: HOSPITAL | Age: 70
End: 2020-03-09
Attending: PHYSICIAN ASSISTANT
Payer: MEDICARE

## 2020-03-09 DIAGNOSIS — E11.9 TYPE 2 DIABETES MELLITUS WITHOUT COMPLICATION, WITHOUT LONG-TERM CURRENT USE OF INSULIN: ICD-10-CM

## 2020-03-09 DIAGNOSIS — E78.00 PURE HYPERCHOLESTEROLEMIA: ICD-10-CM

## 2020-03-09 DIAGNOSIS — E21.3 HYPERPARATHYROIDISM: ICD-10-CM

## 2020-03-09 LAB
ALBUMIN SERPL BCP-MCNC: 3.2 G/DL (ref 3.5–5.2)
ALP SERPL-CCNC: 99 U/L (ref 55–135)
ALT SERPL W/O P-5'-P-CCNC: 31 U/L (ref 10–44)
ANION GAP SERPL CALC-SCNC: 9 MMOL/L (ref 8–16)
AST SERPL-CCNC: 24 U/L (ref 10–40)
BILIRUB SERPL-MCNC: 0.3 MG/DL (ref 0.1–1)
BUN SERPL-MCNC: 17 MG/DL (ref 8–23)
CA-I BLDV-SCNC: 1.24 MMOL/L (ref 1.06–1.42)
CALCIUM SERPL-MCNC: 9.9 MG/DL (ref 8.7–10.5)
CHLORIDE SERPL-SCNC: 107 MMOL/L (ref 95–110)
CHOLEST SERPL-MCNC: 225 MG/DL (ref 120–199)
CHOLEST/HDLC SERPL: 4.6 {RATIO} (ref 2–5)
CO2 SERPL-SCNC: 26 MMOL/L (ref 23–29)
CREAT SERPL-MCNC: 1 MG/DL (ref 0.5–1.4)
EST. GFR  (AFRICAN AMERICAN): >60 ML/MIN/1.73 M^2
EST. GFR  (NON AFRICAN AMERICAN): 58.4 ML/MIN/1.73 M^2
GLUCOSE SERPL-MCNC: 122 MG/DL (ref 70–110)
HDLC SERPL-MCNC: 49 MG/DL (ref 40–75)
HDLC SERPL: 21.8 % (ref 20–50)
LDLC SERPL CALC-MCNC: 145.2 MG/DL (ref 63–159)
NONHDLC SERPL-MCNC: 176 MG/DL
POTASSIUM SERPL-SCNC: 4.4 MMOL/L (ref 3.5–5.1)
PROT SERPL-MCNC: 6.9 G/DL (ref 6–8.4)
SODIUM SERPL-SCNC: 142 MMOL/L (ref 136–145)
T4 FREE SERPL-MCNC: 0.96 NG/DL (ref 0.71–1.51)
TRIGL SERPL-MCNC: 154 MG/DL (ref 30–150)
TSH SERPL DL<=0.005 MIU/L-ACNC: 0.82 UIU/ML (ref 0.4–4)
URATE SERPL-MCNC: 6.1 MG/DL (ref 2.4–5.7)

## 2020-03-09 PROCEDURE — 83970 ASSAY OF PARATHORMONE: CPT

## 2020-03-09 PROCEDURE — 84439 ASSAY OF FREE THYROXINE: CPT

## 2020-03-09 PROCEDURE — 80053 COMPREHEN METABOLIC PANEL: CPT

## 2020-03-09 PROCEDURE — 83036 HEMOGLOBIN GLYCOSYLATED A1C: CPT

## 2020-03-09 PROCEDURE — 82330 ASSAY OF CALCIUM: CPT

## 2020-03-09 PROCEDURE — 84443 ASSAY THYROID STIM HORMONE: CPT

## 2020-03-09 PROCEDURE — 80061 LIPID PANEL: CPT

## 2020-03-09 PROCEDURE — 36415 COLL VENOUS BLD VENIPUNCTURE: CPT | Mod: PO

## 2020-03-09 PROCEDURE — 84550 ASSAY OF BLOOD/URIC ACID: CPT

## 2020-03-10 LAB
ESTIMATED AVG GLUCOSE: 143 MG/DL (ref 68–131)
HBA1C MFR BLD HPLC: 6.6 % (ref 4–5.6)
PTH-INTACT SERPL-MCNC: 147 PG/ML (ref 9–77)

## 2020-04-17 ENCOUNTER — TELEPHONE (OUTPATIENT)
Dept: ENDOCRINOLOGY | Facility: CLINIC | Age: 70
End: 2020-04-17

## 2020-04-17 NOTE — TELEPHONE ENCOUNTER
Called patient, advised that she needs to contact her PCP for this issue. Patient verbalized understanding.

## 2020-04-17 NOTE — TELEPHONE ENCOUNTER
----- Message from David Hein sent at 4/17/2020 12:13 PM CDT -----  Contact: pt   Type: Needs Medical Advice    Who Called:  pt    Best Call Back Number: [phone]  Additional Information: pt would like to discuss coming into the clinic for an appointment. Pt states she has a UTI. Please call to advise.

## 2020-04-30 ENCOUNTER — PATIENT MESSAGE (OUTPATIENT)
Dept: ENDOCRINOLOGY | Facility: CLINIC | Age: 70
End: 2020-04-30

## 2020-04-30 ENCOUNTER — TELEPHONE (OUTPATIENT)
Dept: ENDOCRINOLOGY | Facility: CLINIC | Age: 70
End: 2020-04-30

## 2020-04-30 NOTE — TELEPHONE ENCOUNTER
----- Message from Inder Altamirano sent at 4/30/2020 10:20 AM CDT -----  Contact: Patient  Type: Needs Medical Advice    Who Called:  Patient  Best Call Back Number: [phone]  Additional Information: Caller would like lab orders faxed to [phone] (Dr. Sadler). Please call to advise. Thanks!

## 2020-04-30 NOTE — TELEPHONE ENCOUNTER
Per patient request lab results were faxed to requested provider at number provided. Patient aware.

## 2020-05-12 DIAGNOSIS — E55.9 HYPOVITAMINOSIS D: ICD-10-CM

## 2020-05-12 RX ORDER — ERGOCALCIFEROL 1.25 MG/1
CAPSULE ORAL
Qty: 36 CAPSULE | Refills: 1 | Status: SHIPPED | OUTPATIENT
Start: 2020-05-12 | End: 2021-12-23 | Stop reason: SDUPTHER

## 2020-05-18 ENCOUNTER — PATIENT MESSAGE (OUTPATIENT)
Dept: OPTOMETRY | Facility: CLINIC | Age: 70
End: 2020-05-18

## 2020-05-25 ENCOUNTER — OFFICE VISIT (OUTPATIENT)
Dept: ENDOCRINOLOGY | Facility: CLINIC | Age: 70
End: 2020-05-25
Payer: MEDICARE

## 2020-05-25 VITALS
HEIGHT: 67 IN | WEIGHT: 293 LBS | DIASTOLIC BLOOD PRESSURE: 90 MMHG | BODY MASS INDEX: 45.99 KG/M2 | SYSTOLIC BLOOD PRESSURE: 150 MMHG | TEMPERATURE: 98 F | HEART RATE: 85 BPM

## 2020-05-25 DIAGNOSIS — R80.8 OTHER PROTEINURIA: ICD-10-CM

## 2020-05-25 DIAGNOSIS — E21.3 HYPERPARATHYROIDISM: ICD-10-CM

## 2020-05-25 DIAGNOSIS — E79.0 HYPERURICEMIA: ICD-10-CM

## 2020-05-25 DIAGNOSIS — E04.9 GOITER: ICD-10-CM

## 2020-05-25 DIAGNOSIS — E06.9 THYROIDITIS: ICD-10-CM

## 2020-05-25 DIAGNOSIS — I10 ESSENTIAL HYPERTENSION: ICD-10-CM

## 2020-05-25 DIAGNOSIS — Z78.9 STATIN INTOLERANCE: ICD-10-CM

## 2020-05-25 DIAGNOSIS — E11.65 TYPE 2 DIABETES MELLITUS WITH HYPERGLYCEMIA, WITHOUT LONG-TERM CURRENT USE OF INSULIN: ICD-10-CM

## 2020-05-25 DIAGNOSIS — I25.10 CORONARY ARTERY DISEASE INVOLVING NATIVE CORONARY ARTERY OF NATIVE HEART WITHOUT ANGINA PECTORIS: ICD-10-CM

## 2020-05-25 DIAGNOSIS — E04.1 NODULAR THYROID DISEASE: ICD-10-CM

## 2020-05-25 DIAGNOSIS — G47.33 OSA (OBSTRUCTIVE SLEEP APNEA): ICD-10-CM

## 2020-05-25 DIAGNOSIS — E78.5 HYPERLIPIDEMIA, UNSPECIFIED HYPERLIPIDEMIA TYPE: ICD-10-CM

## 2020-05-25 DIAGNOSIS — E66.01 MORBID OBESITY WITH BODY MASS INDEX (BMI) GREATER THAN OR EQUAL TO 50: ICD-10-CM

## 2020-05-25 DIAGNOSIS — R79.89 ABNORMAL THYROID BLOOD TEST: ICD-10-CM

## 2020-05-25 DIAGNOSIS — M10.00 IDIOPATHIC GOUT, UNSPECIFIED CHRONICITY, UNSPECIFIED SITE: ICD-10-CM

## 2020-05-25 DIAGNOSIS — E55.9 HYPOVITAMINOSIS D: ICD-10-CM

## 2020-05-25 DIAGNOSIS — E03.9 ACQUIRED HYPOTHYROIDISM: Primary | ICD-10-CM

## 2020-05-25 PROBLEM — R80.9 PROTEINURIA: Status: ACTIVE | Noted: 2020-05-25

## 2020-05-25 PROCEDURE — 99999 PR PBB SHADOW E&M-EST. PATIENT-LVL IV: CPT | Mod: PBBFAC,,, | Performed by: INTERNAL MEDICINE

## 2020-05-25 PROCEDURE — 99215 PR OFFICE/OUTPT VISIT, EST, LEVL V, 40-54 MIN: ICD-10-PCS | Mod: S$PBB,,, | Performed by: INTERNAL MEDICINE

## 2020-05-25 PROCEDURE — 99999 PR PBB SHADOW E&M-EST. PATIENT-LVL IV: ICD-10-PCS | Mod: PBBFAC,,, | Performed by: INTERNAL MEDICINE

## 2020-05-25 PROCEDURE — 99215 OFFICE O/P EST HI 40 MIN: CPT | Mod: S$PBB,,, | Performed by: INTERNAL MEDICINE

## 2020-05-25 PROCEDURE — 99214 OFFICE O/P EST MOD 30 MIN: CPT | Mod: PBBFAC,PO | Performed by: INTERNAL MEDICINE

## 2020-05-25 NOTE — PROGRESS NOTES
Subjective:      Patient ID: Juhi Taylor is a 69 y.o. female.    Chief Complaint:  Diabetes (2)    Patient is a 69 yr postmenopausal lady seen in [facility] today on account of type 2 diabetes and hypothyroidism.    History of Present Illness    Patient is a 69 yr old postmenopausal lady who has been previously seen and ffed by Any Adams PA-C seen in [facility] today on account of type 2 diabetes and hypothyroidism.    She  was diagnosed with Type 2 DM ~15 years ago. No hospitalizations for DM. Her daughter has DM. No fhx of thyroid disease.      States Avapro caused swelling and inability to urinate.      Hyperparathyroidism.   No n/v.  Occ abdominal pain. No vomiting. Kidney stones in 2015. She had a CT in 1/19 that was apparently normal.     CURRENT DIABETIC MEDS: glipizide 5 mg BID, metformin 1000 mg daily  Her most recent HBA1c from 03/2020 was 6.6 whic is at goal.     Does not check glucose.   Hypoglycemia: None  Type of Glucose Meter: one touch verio     Physical Activity: none     Dietary Habits: 1-2 meal daily. Drinks water.      Last Eye Exam: 1/19  Last Podiatry Exam: 2/19     Last DM Education Attended: Last year     Thyroid nodule  Dx 5-10 years ago  Bx in 2015 of left thyroid nodule was benign  Last thyroid u/s 6/19 showed a dominant nodule 1.5 cm in the right lobe which could not be reached on previous attempt at FNA in 12//18 . Left lobe has a 2.4 cm nodule that has not changed since the prior exam.  No new voice changes, sob, dysphagia.      Hypothyroidism  Dx 10 years ago  LT4 50 mcg qd  +  hair loss (hereditary), brittle nails,   No h/c intolerance, palpitations, insomnia, constipation/diarrhea.      NICOLE-wears cpap     Social Hx: smoked 1 ppd for 40 years. She quit 2 years ago.  No ETOH use.      DEXA 2/19: Osteopenia. Taking ergo 3x weekly. Her next DEXA should be for ~ 02/21.    Review of Systems   Constitutional: Negative for diaphoresis, fever and unexpected weight change.   HENT: Negative for  "facial swelling and trouble swallowing.    Eyes: Negative for visual disturbance.   Respiratory: Negative for cough and shortness of breath.    Cardiovascular: Negative for chest pain, palpitations and leg swelling.   Gastrointestinal: Positive for abdominal distention (chronic). Negative for abdominal pain, diarrhea and vomiting.   Endocrine: Positive for polyuria.   Genitourinary: Negative for difficulty urinating, frequency and menstrual problem (postmenopausal).   Musculoskeletal: Positive for arthralgias (mainly of the hips R>L), back pain (Chronic) and gait problem (walks with aid of walking stick). Negative for myalgias.   Skin: Negative for color change, pallor and rash.   Neurological: Negative for dizziness and headaches.   Hematological: Does not bruise/bleed easily.       Objective:  BP (!) 150/90 (BP Location: Right arm, Patient Position: Sitting, BP Method: Large (Manual))   Pulse 85   Temp 98.4 °F (36.9 °C) (Oral)   Ht 5' 7" (1.702 m)   Wt (!) 151.5 kg (333 lb 14.2 oz)   BMI 52.29 kg/m²  Body surface area is 2.68 meters squared.         Physical Exam   Constitutional: She is oriented to person, place, and time. She appears well-developed and well-nourished. She appears distressed.   Pleasant elderly lady. Not pale, anicteric but not in significant distress from chronic back pain and hip pains.  Not cyanotic.   HENT:   Head: Normocephalic and atraumatic.   Eyes: Pupils are equal, round, and reactive to light. Conjunctivae and EOM are normal.   Neck: Normal range of motion. Neck supple. Thyromegaly (chronic) present.   Cardiovascular: Normal rate, regular rhythm and normal heart sounds.   No murmur heard.  Pulmonary/Chest: No respiratory distress.   Abdominal: Soft. She exhibits distension. There is no tenderness. There is no guarding.   Obese anterior abdominal wall.   Musculoskeletal: She exhibits edema (bipedal edema; chronic).   Neurological: She is alert and oriented to person, place, and " time. No cranial nerve deficit.   Skin: Skin is warm and dry. No rash noted. She is not diaphoretic. No erythema. No pallor.   Psychiatric: She has a normal mood and affect. Her behavior is normal. Judgment and thought content normal.   Vitals reviewed.      Lab Review:     Results for YOLANDA HUNTLEY (MRN [id number]) as of 5/25/2020 14:12   Ref. Range 1/31/2020 12:13 1/31/2020 12:37 3/9/2020 11:10   WBC Latest Ref Range: 3.90 - 12.70 K/uL  8.15    RBC Latest Ref Range: 4.00 - 5.40 M/uL  4.78    Hemoglobin Latest Ref Range: 12.0 - 16.0 g/dL  12.1    Hematocrit Latest Ref Range: 37.0 - 48.5 %  42.2    MCV Latest Ref Range: 82 - 98 fL  88    MCH Latest Ref Range: 27.0 - 31.0 pg  25.3 (L)    MCHC Latest Ref Range: 32.0 - 36.0 g/dL  28.7 (L)    RDW Latest Ref Range: 11.5 - 14.5 %  15.0 (H)    Platelets Latest Ref Range: 150 - 350 K/uL  311    MPV Latest Ref Range: 9.2 - 12.9 fL  9.7    Gran% Latest Ref Range: 38.0 - 73.0 %  53.1    Gran # (ANC) Latest Ref Range: 1.8 - 7.7 K/uL  4.3    Lymph% Latest Ref Range: 18.0 - 48.0 %  35.1    Lymph # Latest Ref Range: 1.0 - 4.8 K/uL  2.9    Mono% Latest Ref Range: 4.0 - 15.0 %  7.7    Mono # Latest Ref Range: 0.3 - 1.0 K/uL  0.6    Eosinophil% Latest Ref Range: 0.0 - 8.0 %  3.3    Eos # Latest Ref Range: 0.0 - 0.5 K/uL  0.3    Basophil% Latest Ref Range: 0.0 - 1.9 %  0.4    Baso # Latest Ref Range: 0.00 - 0.20 K/uL  0.03    nRBC Latest Ref Range: 0 /100 WBC  0    Differential Method Unknown  Automated    Immature Grans (Abs) Latest Ref Range: 0.00 - 0.04 K/uL  0.03    Immature Granulocytes Latest Ref Range: 0.0 - 0.5 %  0.4    Iron Latest Ref Range: 30 - 160 ug/dL  39    TIBC Latest Ref Range: 250 - 450 ug/dL  420    Saturated Iron Latest Ref Range: 20 - 50 %  9 (L)    Transferrin Latest Ref Range: 200 - 375 mg/dL  284    Ferritin Latest Ref Range: 20.0 - 300.0 ng/mL  162    Sodium Latest Ref Range: 136 - 145 mmol/L  137 142   Potassium Latest Ref Range: 3.5 - 5.1 mmol/L  4.5  4.4   Chloride Latest Ref Range: 95 - 110 mmol/L  105 107   CO2 Latest Ref Range: 23 - 29 mmol/L  23 26   Anion Gap Latest Ref Range: 8 - 16 mmol/L  9 9   BUN, Bld Latest Ref Range: 8 - 23 mg/dL  22 17   Creatinine Latest Ref Range: 0.5 - 1.4 mg/dL  1.0 1.0   eGFR if non African American Latest Ref Range: >60 mL/min/1.73 m^2  58.4 (A) 58.4 (A)   eGFR if African American Latest Ref Range: >60 mL/min/1.73 m^2  >60.0 >60.0   Glucose Latest Ref Range: 70 - 110 mg/dL  93 122 (H)   Calcium Latest Ref Range: 8.7 - 10.5 mg/dL  10.1 9.9   Calcium, Ion Latest Ref Range: 1.06 - 1.42 mmol/L   1.24   Phosphorus Latest Ref Range: 2.7 - 4.5 mg/dL  2.9    Magnesium Latest Ref Range: 1.6 - 2.6 mg/dL  1.8    Alkaline Phosphatase Latest Ref Range: 55 - 135 U/L   99   PROTEIN TOTAL Latest Ref Range: 6.0 - 8.4 g/dL   6.9   Protein, Serum Latest Ref Range: 6.0 - 8.4 g/dL  6.7    Albumin Latest Ref Range: 3.5 - 5.2 g/dL  3.3 (L) 3.2 (L)   Uric Acid Latest Ref Range: 2.4 - 5.7 mg/dL   6.1 (H)   BILIRUBIN TOTAL Latest Ref Range: 0.1 - 1.0 mg/dL   0.3   AST Latest Ref Range: 10 - 40 U/L   24   ALT Latest Ref Range: 10 - 44 U/L   31   Triglycerides Latest Ref Range: 30 - 150 mg/dL   154 (H)   Cholesterol Latest Ref Range: 120 - 199 mg/dL   225 (H)   HDL Latest Ref Range: 40 - 75 mg/dL   49   Hdl/Cholesterol Ratio Latest Ref Range: 20.0 - 50.0 %   21.8   LDL Cholesterol External Latest Ref Range: 63.0 - 159.0 mg/dL   145.2   Non-HDL Cholesterol Latest Units: mg/dL   176   Total Cholesterol/HDL Ratio Latest Ref Range: 2.0 - 5.0    4.6   CK MB Latest Ref Range: 0.0 - 3.3 %  0.0    CK-BB Latest Ref Range: 0.0 %  0.0    CK-MM Latest Ref Range: 96.7 - 100.0 %  100.0    CPK Latest Ref Range: 20 - 180 U/L  93    Total CK Latest Ref Range: 30 - 223 u/L  84    Hemoglobin A1C External Latest Ref Range: 4.0 - 5.6 %   6.6 (H)   Estimated Avg Glucose Latest Ref Range: 68 - 131 mg/dL   143 (H)   TSH Latest Ref Range: 0.400 - 4.000 uIU/mL   0.821   Free T4  Latest Ref Range: 0.71 - 1.51 ng/dL   0.96   PTH Latest Ref Range: 9.0 - 77.0 pg/mL   147.0 (H)   BOWEN Screen Latest Ref Range: Negative <1:80   Negative <1:80    Albumin grams/dl Latest Ref Range: 3.35 - 5.55 g/dL  3.57    Alpha-1 grams/dl Latest Ref Range: 0.17 - 0.41 g/dL  0.29    Alpha-2 grams/dl Latest Ref Range: 0.43 - 0.99 g/dL  0.95    Beta grams/dl Latest Ref Range: 0.50 - 1.10 g/dL  1.00    Gamma grams/dl Latest Ref Range: 0.67 - 1.58 g/dL  0.88    Pathologist Interpretation SPE Unknown  REVIEWED    Pathologist Interpretation UPE Unknown REVIEWED     Protein Electrophoresis, Ur Unknown SEE COMMENT     Rheumatoid Factor Latest Ref Range: 0.0 - 15.0 IU/mL  <10.0    Hep A IgM Latest Ref Range: Negative   Negative    Hep B C IgM Latest Ref Range: Negative   Negative    Hepatitis B Surface Ag Latest Ref Range: Negative   Negative    Hepatitis C Ab Latest Ref Range: Negative   Negative    Specimen UA Unknown Urine, Clean Catch     Color, UA Latest Ref Range: Yellow, Straw, Sherita  Yellow     Appearance, UA Latest Ref Range: Clear  Clear     Specific [place], UA Latest Ref Range: 1.005 - 1.030  1.010     pH, UA Latest Ref Range: 5.0 - 8.0  5.0     Protein, UA Latest Ref Range: Negative  2+ (A)     Glucose, UA Latest Ref Range: Negative  Negative     Ketones, UA Latest Ref Range: Negative  Negative     Occult Blood UA Latest Ref Range: Negative  Negative     NITRITE UA Latest Ref Range: Negative  Negative     Bilirubin (UA) Latest Ref Range: Negative  Negative     Leukocytes, UA Latest Ref Range: Negative  Negative     RBC, UA Latest Ref Range: 0 - 4 /hpf 0     WBC, UA Latest Ref Range: 0 - 5 /hpf 0     Bacteria, UA Latest Ref Range: None-Occ /hpf None     Squam Epithel, UA Latest Units: /hpf 0     Hyaline Casts, UA Latest Ref Range: 0-1/lpf /lpf 0     Microscopic Comment Unknown SEE COMMENT     Microalbum.,U,Random Latest Units: ug/mL 759.0     Creatinine, Random Ur Latest Ref Range: 15.0 - 325.0 mg/dL 66.0      PROTEIN URINE Latest Ref Range: 0 - 15 mg/dL 106 (H)     MICROALB/CREAT RATIO Latest Ref Range: 0.0 - 30.0 ug/mg 1150.0 (H)     Urine Protein, Timed Latest Units: mg/Spec CANCELED     Urine Collection Duration Latest Units: Hr Random         Assessment:     1. Acquired hypothyroidism     2. Goiter     3. Thyroiditis     4. Abnormal thyroid blood test     5. Nodular thyroid disease  T4, free    T3    Thyroglobulin    TSH    Iodine, Serum    Calcitonin    Chromogranin A    US Soft Tissue Head Neck Thyroid   6. Essential hypertension     7. Hyperlipidemia, unspecified hyperlipidemia type  alirocumab (PRALUENT PEN) 75 mg/mL PnIj   8. Coronary artery disease involving native coronary artery of native heart without angina pectoris  alirocumab (PRALUENT PEN) 75 mg/mL PnIj   9. Hyperparathyroidism     10. Hypovitaminosis D  Vitamin D    PTH, intact    Calcium, Ionized   11. Idiopathic gout, unspecified chronicity, unspecified site  alirocumab (PRALUENT PEN) 75 mg/mL PnIj   12. Statin intolerance  alirocumab (PRALUENT PEN) 75 mg/mL PnIj   13. NICOLE (obstructive sleep apnea)  Aldosterone    Renin   14. Hyperuricemia     15. Morbid obesity with body mass index (BMI) greater than or equal to 50     16. Type 2 diabetes mellitus with hyperglycemia, without long-term current use of insulin  GlycoMark (TM)    Fructosamine    empagliflozin (JARDIANCE) 10 mg tablet    alirocumab (PRALUENT PEN) 75 mg/mL PnIj   17. Other proteinuria          Thyroid nodule-nodules have been stable in size-repeat thyroid u/s 6/20. Pt declines surgery.  Zsqwwfsxongcnt-csfddg-zashmxdj LT4 dose. TFTs wnl.  T2DM- to stop glipizide and instead commence jardiance 10mg QD.  HTN-suboptimal control-Advised to track ambulatory BP and pulse rates and provide same to Dr Mendez for adjustment of her antihypertensive therapu   HLD-stable-statin intolerant. To continue Praulent 75 mg q 2 weeks.   Hypovitaminosis D- vd is normal. Continue ergo.  QDO-atxtff-isgevkxf  CPAP  Hyperparathryoidism-stable-monitor. Avoid calcium supplements. Increase intake of water. Calcium is normal.   Gout-elevated-continue allopurinol. Increase intake of water. Recheck next visit.   Microalbuminuria-kidney u/s. Referral to nephrology. Repeat mac.   I spent > 45 minutes in direct face to face counselling of the patient regarding the intended investigative and management plan.    Plan:     FFup in 3mths.

## 2020-05-27 ENCOUNTER — TELEPHONE (OUTPATIENT)
Dept: RHEUMATOLOGY | Facility: CLINIC | Age: 70
End: 2020-05-27

## 2020-05-27 NOTE — TELEPHONE ENCOUNTER
----- Message from Cassy Ervin sent at 5/27/2020  3:32 PM CDT -----  Contact: pt  Pt would like to receive a call back regarding scheduling an appt for arthritis. Pt states she is in pain and doesn't have any more medication. Please contact the pt to advise    Contact info [phone]

## 2020-06-03 ENCOUNTER — TELEPHONE (OUTPATIENT)
Dept: PHARMACY | Facility: CLINIC | Age: 70
End: 2020-06-03

## 2020-06-03 DIAGNOSIS — E78.5 HYPERLIPIDEMIA, UNSPECIFIED HYPERLIPIDEMIA TYPE: ICD-10-CM

## 2020-06-03 DIAGNOSIS — M10.00 IDIOPATHIC GOUT, UNSPECIFIED CHRONICITY, UNSPECIFIED SITE: ICD-10-CM

## 2020-06-03 DIAGNOSIS — E11.65 TYPE 2 DIABETES MELLITUS WITH HYPERGLYCEMIA, WITHOUT LONG-TERM CURRENT USE OF INSULIN: ICD-10-CM

## 2020-06-03 DIAGNOSIS — Z78.9 STATIN INTOLERANCE: ICD-10-CM

## 2020-06-03 DIAGNOSIS — I25.10 CORONARY ARTERY DISEASE INVOLVING NATIVE CORONARY ARTERY OF NATIVE HEART WITHOUT ANGINA PECTORIS: ICD-10-CM

## 2020-06-03 NOTE — TELEPHONE ENCOUNTER
----- Message from David Hein sent at 6/3/2020 10:38 AM CDT -----  Contact: [facility]  Type: Needs Medical Advice    Who Called:  Michael Simon Call Back Number: [phone]  Additional Information: Calling to follow up on a PA for medication alirocumab (PRALUENT PEN) 75 mg/mL PnIj. [employer] needs a formulary exception. Please call to advise.

## 2020-06-04 NOTE — TELEPHONE ENCOUNTER
DOCUMENTATION ONLY:  Prior authorization for Praluent approved from 06/04/20 to 12/31/20    Co-pay: $3.90    Assistance is not needed

## 2020-06-04 NOTE — TELEPHONE ENCOUNTER
LVM for patient to see if she was ready to restart the medication or if she is still holding off on it. Will follow up

## 2020-06-08 NOTE — TELEPHONE ENCOUNTER
Called patient for praluent consult and fill. Name/ confirmed. Patient states she is receiving it from [facility] and her last injection was on . She has no questions and this time and states she will continue to receive it from them. Patient knows [facility] will no longer be calling her and I informed her that she is welcome to call at any time with questions or concerns.    Dinesh Reed, Pharm.D.  Pharmacy Resident, PGY-1   [facility]  [phone]

## 2020-06-15 ENCOUNTER — HOSPITAL ENCOUNTER (OUTPATIENT)
Dept: RADIOLOGY | Facility: HOSPITAL | Age: 70
Discharge: HOME | End: 2020-06-15
Attending: INTERNAL MEDICINE
Payer: MEDICARE

## 2020-06-15 DIAGNOSIS — E04.1 NODULAR THYROID DISEASE: ICD-10-CM

## 2020-06-15 PROCEDURE — 76536 US EXAM OF HEAD AND NECK: CPT | Mod: TC,PO

## 2020-06-15 PROCEDURE — 76536 US EXAM OF HEAD AND NECK: CPT | Mod: 26,,, | Performed by: RADIOLOGY

## 2020-06-15 PROCEDURE — 76536 US SOFT TISSUE HEAD NECK THYROID: ICD-10-PCS | Mod: 26,,, | Performed by: RADIOLOGY

## 2020-07-28 ENCOUNTER — LAB VISIT (OUTPATIENT)
Dept: LAB | Facility: HOSPITAL | Age: 70
End: 2020-07-28
Attending: INTERNAL MEDICINE
Payer: MEDICARE

## 2020-07-28 DIAGNOSIS — N18.2 CHRONIC KIDNEY DISEASE, STAGE II (MILD): Primary | ICD-10-CM

## 2020-07-28 LAB
BASOPHILS # BLD AUTO: 0.03 K/UL (ref 0–0.2)
BASOPHILS NFR BLD: 0.5 % (ref 0–1.9)
BILIRUB UR QL STRIP: NEGATIVE
CLARITY UR REFRACT.AUTO: CLEAR
COLOR UR AUTO: NORMAL
DIFFERENTIAL METHOD: ABNORMAL
EOSINOPHIL # BLD AUTO: 0.3 K/UL (ref 0–0.5)
EOSINOPHIL NFR BLD: 3.8 % (ref 0–8)
ERYTHROCYTE [DISTWIDTH] IN BLOOD BY AUTOMATED COUNT: 14.9 % (ref 11.5–14.5)
GLUCOSE UR QL STRIP: NEGATIVE
HCT VFR BLD AUTO: 44.4 % (ref 37–48.5)
HGB BLD-MCNC: 12.7 G/DL (ref 12–16)
HGB UR QL STRIP: NEGATIVE
IMM GRANULOCYTES # BLD AUTO: 0.01 K/UL (ref 0–0.04)
IMM GRANULOCYTES NFR BLD AUTO: 0.2 % (ref 0–0.5)
KETONES UR QL STRIP: NEGATIVE
LEUKOCYTE ESTERASE UR QL STRIP: NEGATIVE
LYMPHOCYTES # BLD AUTO: 2.1 K/UL (ref 1–4.8)
LYMPHOCYTES NFR BLD: 32.2 % (ref 18–48)
MCH RBC QN AUTO: 25.8 PG (ref 27–31)
MCHC RBC AUTO-ENTMCNC: 28.6 G/DL (ref 32–36)
MCV RBC AUTO: 90 FL (ref 82–98)
MONOCYTES # BLD AUTO: 0.5 K/UL (ref 0.3–1)
MONOCYTES NFR BLD: 7.4 % (ref 4–15)
NEUTROPHILS # BLD AUTO: 3.7 K/UL (ref 1.8–7.7)
NEUTROPHILS NFR BLD: 55.9 % (ref 38–73)
NITRITE UR QL STRIP: NEGATIVE
NRBC BLD-RTO: 0 /100 WBC
PH UR STRIP: 6 [PH] (ref 5–8)
PLATELET # BLD AUTO: 306 K/UL (ref 150–350)
PMV BLD AUTO: 9.7 FL (ref 9.2–12.9)
PROT UR QL STRIP: NEGATIVE
RBC # BLD AUTO: 4.93 M/UL (ref 4–5.4)
SP GR UR STRIP: 1 (ref 1–1.03)
URN SPEC COLLECT METH UR: NORMAL
WBC # BLD AUTO: 6.59 K/UL (ref 3.9–12.7)

## 2020-07-28 PROCEDURE — 82043 UR ALBUMIN QUANTITATIVE: CPT

## 2020-07-28 PROCEDURE — 81003 URINALYSIS AUTO W/O SCOPE: CPT

## 2020-07-28 PROCEDURE — 85025 COMPLETE CBC W/AUTO DIFF WBC: CPT

## 2020-07-28 PROCEDURE — 36415 COLL VENOUS BLD VENIPUNCTURE: CPT | Mod: PO

## 2020-07-28 PROCEDURE — 80069 RENAL FUNCTION PANEL: CPT

## 2020-07-29 LAB
ALBUMIN SERPL BCP-MCNC: 3.3 G/DL (ref 3.5–5.2)
ALBUMIN/CREAT UR: 26.9 UG/MG (ref 0–30)
ANION GAP SERPL CALC-SCNC: 11 MMOL/L (ref 8–16)
BUN SERPL-MCNC: 20 MG/DL (ref 8–23)
CALCIUM SERPL-MCNC: 10.7 MG/DL (ref 8.7–10.5)
CHLORIDE SERPL-SCNC: 108 MMOL/L (ref 95–110)
CO2 SERPL-SCNC: 23 MMOL/L (ref 23–29)
CREAT SERPL-MCNC: 1.1 MG/DL (ref 0.5–1.4)
CREAT UR-MCNC: 26 MG/DL (ref 15–325)
EST. GFR  (AFRICAN AMERICAN): 59.6 ML/MIN/1.73 M^2
EST. GFR  (NON AFRICAN AMERICAN): 51.7 ML/MIN/1.73 M^2
GLUCOSE SERPL-MCNC: 187 MG/DL (ref 70–110)
MICROALBUMIN UR DL<=1MG/L-MCNC: 7 UG/ML
PHOSPHATE SERPL-MCNC: 4 MG/DL (ref 2.7–4.5)
POTASSIUM SERPL-SCNC: 4.5 MMOL/L (ref 3.5–5.1)
SODIUM SERPL-SCNC: 142 MMOL/L (ref 136–145)

## 2020-08-18 ENCOUNTER — LAB VISIT (OUTPATIENT)
Dept: LAB | Facility: HOSPITAL | Age: 70
End: 2020-08-18
Attending: INTERNAL MEDICINE
Payer: MEDICARE

## 2020-08-18 DIAGNOSIS — E11.65 TYPE 2 DIABETES MELLITUS WITH HYPERGLYCEMIA, WITHOUT LONG-TERM CURRENT USE OF INSULIN: ICD-10-CM

## 2020-08-18 DIAGNOSIS — E04.1 NODULAR THYROID DISEASE: ICD-10-CM

## 2020-08-18 DIAGNOSIS — E55.9 HYPOVITAMINOSIS D: ICD-10-CM

## 2020-08-18 DIAGNOSIS — G47.33 OSA (OBSTRUCTIVE SLEEP APNEA): ICD-10-CM

## 2020-08-18 PROCEDURE — 82330 ASSAY OF CALCIUM: CPT

## 2020-08-18 PROCEDURE — 82985 ASSAY OF GLYCATED PROTEIN: CPT

## 2020-08-18 PROCEDURE — 84480 ASSAY TRIIODOTHYRONINE (T3): CPT

## 2020-08-18 PROCEDURE — 86316 IMMUNOASSAY TUMOR OTHER: CPT

## 2020-08-18 PROCEDURE — 84378 SUGARS SINGLE QUANT: CPT

## 2020-08-18 PROCEDURE — 82088 ASSAY OF ALDOSTERONE: CPT

## 2020-08-18 PROCEDURE — 36415 COLL VENOUS BLD VENIPUNCTURE: CPT | Mod: PO

## 2020-08-18 PROCEDURE — 82306 VITAMIN D 25 HYDROXY: CPT

## 2020-08-18 PROCEDURE — 84443 ASSAY THYROID STIM HORMONE: CPT

## 2020-08-18 PROCEDURE — 83970 ASSAY OF PARATHORMONE: CPT

## 2020-08-18 PROCEDURE — 84439 ASSAY OF FREE THYROXINE: CPT

## 2020-08-19 LAB
25(OH)D3+25(OH)D2 SERPL-MCNC: 37 NG/ML (ref 30–96)
CA-I BLDV-SCNC: 1.36 MMOL/L (ref 1.06–1.42)
PTH-INTACT SERPL-MCNC: 180 PG/ML (ref 9–77)
T3 SERPL-MCNC: 78 NG/DL (ref 60–180)
T4 FREE SERPL-MCNC: 1.04 NG/DL (ref 0.71–1.51)
TSH SERPL DL<=0.005 MIU/L-ACNC: 0.19 UIU/ML (ref 0.4–4)

## 2020-08-20 LAB
CALCIT SERPL-MCNC: <5 PG/ML
IODINE SERPL-MCNC: 59 NG/ML (ref 40–92)
THRYOGLOBULIN INTERPRETATION: ABNORMAL
THYROGLOB AB SERPL-ACNC: <1.8 IU/ML
THYROGLOB SERPL-MCNC: 52 NG/ML

## 2020-08-21 LAB — FRUCTOSAMINE SERPL-SCNC: 292 UMOL /L

## 2020-08-22 LAB — GLYCOMARK (TM): 1.6 UG/ML

## 2020-08-24 LAB
ALDOST SERPL-MCNC: 11.1 NG/DL
CGA SERPL-MCNC: 118 NG/ML (ref 0–103)
RENIN PLAS-CCNC: 2 NG/ML/H

## 2020-08-28 ENCOUNTER — OFFICE VISIT (OUTPATIENT)
Dept: ENDOCRINOLOGY | Facility: CLINIC | Age: 70
End: 2020-08-28
Payer: MEDICARE

## 2020-08-28 VITALS
WEIGHT: 293 LBS | BODY MASS INDEX: 45.99 KG/M2 | HEIGHT: 67 IN | HEART RATE: 82 BPM | DIASTOLIC BLOOD PRESSURE: 80 MMHG | SYSTOLIC BLOOD PRESSURE: 128 MMHG | TEMPERATURE: 98 F

## 2020-08-28 DIAGNOSIS — M10.00 IDIOPATHIC GOUT, UNSPECIFIED CHRONICITY, UNSPECIFIED SITE: ICD-10-CM

## 2020-08-28 DIAGNOSIS — G47.33 OSA (OBSTRUCTIVE SLEEP APNEA): ICD-10-CM

## 2020-08-28 DIAGNOSIS — E11.65 TYPE 2 DIABETES MELLITUS WITH HYPERGLYCEMIA, WITHOUT LONG-TERM CURRENT USE OF INSULIN: ICD-10-CM

## 2020-08-28 DIAGNOSIS — E03.9 ACQUIRED HYPOTHYROIDISM: ICD-10-CM

## 2020-08-28 DIAGNOSIS — E55.9 HYPOVITAMINOSIS D: ICD-10-CM

## 2020-08-28 DIAGNOSIS — E78.5 HYPERLIPIDEMIA, UNSPECIFIED HYPERLIPIDEMIA TYPE: ICD-10-CM

## 2020-08-28 DIAGNOSIS — E04.1 NODULAR THYROID DISEASE: Primary | ICD-10-CM

## 2020-08-28 DIAGNOSIS — I10 ESSENTIAL HYPERTENSION: ICD-10-CM

## 2020-08-28 PROCEDURE — 99214 OFFICE O/P EST MOD 30 MIN: CPT | Mod: S$PBB,,, | Performed by: PHYSICIAN ASSISTANT

## 2020-08-28 PROCEDURE — 99214 PR OFFICE/OUTPT VISIT, EST, LEVL IV, 30-39 MIN: ICD-10-PCS | Mod: S$PBB,,, | Performed by: PHYSICIAN ASSISTANT

## 2020-08-28 PROCEDURE — 99999 PR PBB SHADOW E&M-EST. PATIENT-LVL V: CPT | Mod: PBBFAC,,, | Performed by: PHYSICIAN ASSISTANT

## 2020-08-28 PROCEDURE — 99215 OFFICE O/P EST HI 40 MIN: CPT | Mod: PBBFAC,PO | Performed by: PHYSICIAN ASSISTANT

## 2020-08-28 PROCEDURE — 99999 PR PBB SHADOW E&M-EST. PATIENT-LVL V: ICD-10-PCS | Mod: PBBFAC,,, | Performed by: PHYSICIAN ASSISTANT

## 2020-08-28 NOTE — PROGRESS NOTES
"CC: DM/thyroid nodule/hypothyroidism    HPI: Juhi Taylor was diagnosed with Type 2 DM ~15 years ago. No hospitalizations for DM. Her daughter has DM. No fhx of thyroid disease.     Hyperparathyroidism.   No n/v.  Occ abdominal pain. No vomiting. Kidney stones in 2015. She had a CT in 1/19 that was apparently normal.    CURRENT DIABETIC MEDS: Jardiance 10 mg daily, metformin 1000 mg daily    Does not check glucose.   Hypoglycemia: None  Type of Glucose Meter: one touch verio    Physical Activity: none    Dietary Habits: 1-2 meal daily. Drinks water.     Last Eye Exam: 1/19  Last Podiatry Exam: 2/19    Last DM Education Attended: Last year    Thyroid nodule  Dx 5-10 years ago  Bx in 2015 of left thyroid nodule was benign  Last thyroid u/s 6/20 showed a dominant nodule 1.5 cm in the right lobe which could not be reached on previous attempt at FNA in 12//18 . Left lobe has a 2.4 cm nodule that has not changed since the prior exam.  No new voice changes, sob, dysphagia.     Hypothyroidism  Dx 10 years ago  LT4 50 mcg for six days of the week  +  hair loss (hereditary), brittle nails,   No h/c intolerance, palpitations, insomnia, constipation/diarrhea.     NICOLE-wears cpap    Social Hx: smoked 1 ppd for 40 years. She quit 2 years ago.  No ETOH use.     DEXA 2/18: Osteopenia. Taking ergo 3x weekly.    REVIEW OF SYSTEMS,  General: no weakness or fatigue, wt gain.   Eyes: no intermittent blurry vision or visual disturbances.   Cardiac: + leg swelling, no chest pain or palpitations.   Respiratory: no cough or dyspnea.   GI: no abdominal pain or nausea.   Skin: no rashes or itching.   Neuro: no numbness or tingling.   Musc: + back pain, leg pain  Endocrine: no polyuria, polydipsia, polyphagia.   Remainder ROS negative    Vital Signs  /80 (BP Location: Right arm, Patient Position: Sitting, BP Method: Large (Manual))   Pulse 82   Temp 97.7 °F (36.5 °C) (Temporal)   Ht 5' 7" (1.702 m)   Wt (!) 139.1 kg (306 lb " 10.6 oz)   BMI 48.03 kg/m²     Personally reviewed labs below with pt:     Hemoglobin A1C   Date Value Ref Range Status   03/09/2020 6.6 (H) 4.0 - 5.6 % Final     Comment:     ADA Screening Guidelines:  5.7-6.4%  Consistent with prediabetes  >or=6.5%  Consistent with diabetes  High levels of fetal hemoglobin interfere with the HbA1C  assay. Heterozygous hemoglobin variants (HbS, HgC, etc)do  not significantly interfere with this assay.   However, presence of multiple variants may affect accuracy.     12/09/2019 6.4 (H) 4.0 - 5.6 % Final     Comment:     ADA Screening Guidelines:  5.7-6.4%  Consistent with prediabetes  >or=6.5%  Consistent with diabetes  High levels of fetal hemoglobin interfere with the HbA1C  assay. Heterozygous hemoglobin variants (HbS, HgC, etc)do  not significantly interfere with this assay.   However, presence of multiple variants may affect accuracy.     09/11/2019 6.2 (H) 4.0 - 5.6 % Final     Comment:     ADA Screening Guidelines:  5.7-6.4%  Consistent with prediabetes  >or=6.5%  Consistent with diabetes  High levels of fetal hemoglobin interfere with the HbA1C  assay. Heterozygous hemoglobin variants (HbS, HgC, etc)do  not significantly interfere with this assay.   However, presence of multiple variants may affect accuracy.         Chemistry        Component Value Date/Time     07/28/2020 1233    K 4.5 07/28/2020 1233     07/28/2020 1233    CO2 23 07/28/2020 1233    BUN 20 07/28/2020 1233    CREATININE 1.1 07/28/2020 1233     (H) 07/28/2020 1233        Component Value Date/Time    CALCIUM 10.7 (H) 07/28/2020 1233    ALKPHOS 99 03/09/2020 1110    AST 24 03/09/2020 1110    ALT 31 03/09/2020 1110    BILITOT 0.3 03/09/2020 1110    ESTGFRAFRICA 59.6 (A) 07/28/2020 1233    EGFRNONAA 51.7 (A) 07/28/2020 1233        Lab Results   Component Value Date    CHOL 225 (H) 03/09/2020    CHOL 212 (H) 12/09/2019    CHOL 198 09/11/2019     Lab Results   Component Value Date    HDL 49  03/09/2020    HDL 49 12/09/2019    HDL 52 09/11/2019     Lab Results   Component Value Date    LDLCALC 145.2 03/09/2020    LDLCALC 133.4 12/09/2019    LDLCALC 124.8 09/11/2019     Lab Results   Component Value Date    TRIG 154 (H) 03/09/2020    TRIG 148 12/09/2019    TRIG 106 09/11/2019     Lab Results   Component Value Date    CHOLHDL 21.8 03/09/2020    CHOLHDL 23.1 12/09/2019    CHOLHDL 26.3 09/11/2019     Lab Results   Component Value Date    TSH 0.188 (L) 08/18/2020     2016 u/s      2018 below      Lab Results   Component Value Date    MICALBCREAT 26.9 07/28/2020     Vit D, 25-Hydroxy   Date Value Ref Range Status   08/18/2020 37 30 - 96 ng/mL Final     Comment:     Vitamin D deficiency.........<10 ng/mL                              Vitamin D insufficiency......10-29 ng/mL       Vitamin D sufficiency........> or equal to 30 ng/mL  Vitamin D toxicity............>100 ng/mL       PHYSICAL EXAMINATION  Constitutional: middle aged female, appears well, no distress  Neck: Supple, trachea midline.   Respiratory: even and unlabored, CTAB without wheezes.  Cardiovascular: RRR; no carotid bruits or murmurs.   Lymph: DP pulses  2+ bilaterally; 2+ edema bl  Skin: warm and dry; no acanthosis nigracans observed.  Neuro: patient alert and cooperative; CN 2-12 grossly intact  Abdomen: soft, nontender, non-distended. Obese abdomen.   Psych: normal mood and affect  Foot Exam: no sores or macerations noted.     Protective Sensation (w/ 10 gram monofilament):  Right: Intact  Left: Intact    Visual Inspection:  Normal -  Bilateral, Nails Intact - without Evidence of Foot Deformity- Bilateral and Dry Skin -  Bilateral    Pedal Pulses:   Right: Present  Left: Present    Posterior tibialis:   Right:Present  Left: Present     Vibratory Sensation  Right:Positive  Left:Positive     Assessment/Plan    1. Nodular thyroid disease     2. Acquired hypothyroidism  TSH    T4, free   3. Type 2 diabetes mellitus with hyperglycemia, without  long-term current use of insulin  Renal function panel    Hemoglobin A1C    Ambulatory referral/consult to Ophthalmology   4. Essential hypertension     5. Hyperlipidemia, unspecified hyperlipidemia type  Lipid Panel   6. Hypovitaminosis D     7. NICOLE (obstructive sleep apnea)     8. Idiopathic gout, unspecified chronicity, unspecified site        Thyroid nodule-nodules have been stable in size-repeat thyroid u/s 6/21. Pt declines surgery.  Hypothyroidism-stable-TSH is too low. Decrease LT4 to 50 mcg for six days of the week.   J4LY-Fzvsoxldmewp is in the desired range. Continue current regimen.   LCN-vslkne-rjufffyo Lisinopril 40 mg daily.   HLD-stable-statin intolerant. Continue Praulent 75 mg q 2 weeks. Recheck LP.  Hypovitaminosis D- vd is normal. Continue ergo.  TPJ-kjhcax-tevpxmve CPAP  Hyperparathryoidism-stable-monitor. Avoid calcium supplements. Increase intake of water. Calcium is normal.   Gout-elevated-continue allopurinol. Increase intake of water. Recheck next visit.   Microalbuminuria-stable-monitor      FOLLOW UP  3 mths

## 2020-08-31 ENCOUNTER — OFFICE VISIT (OUTPATIENT)
Dept: OTOLARYNGOLOGY | Facility: CLINIC | Age: 70
End: 2020-08-31
Payer: MEDICARE

## 2020-08-31 VITALS — BODY MASS INDEX: 45.99 KG/M2 | HEIGHT: 67 IN | WEIGHT: 293 LBS

## 2020-08-31 DIAGNOSIS — R49.0 DYSPHONIA: ICD-10-CM

## 2020-08-31 DIAGNOSIS — Z98.890 STATUS POST FUNCTIONAL ENDOSCOPIC SINUS SURGERY (FESS): ICD-10-CM

## 2020-08-31 DIAGNOSIS — J32.9 CHRONIC SINUSITIS, UNSPECIFIED LOCATION: Primary | ICD-10-CM

## 2020-08-31 DIAGNOSIS — R09.89 CHRONIC THROAT CLEARING: ICD-10-CM

## 2020-08-31 DIAGNOSIS — K21.9 LPRD (LARYNGOPHARYNGEAL REFLUX DISEASE): ICD-10-CM

## 2020-08-31 DIAGNOSIS — R07.0 THROAT DISCOMFORT: ICD-10-CM

## 2020-08-31 DIAGNOSIS — R09.A2 GLOBUS PHARYNGEUS: ICD-10-CM

## 2020-08-31 PROCEDURE — 99215 OFFICE O/P EST HI 40 MIN: CPT | Mod: S$PBB,,, | Performed by: NURSE PRACTITIONER

## 2020-08-31 PROCEDURE — 99999 PR PBB SHADOW E&M-EST. PATIENT-LVL IV: CPT | Mod: PBBFAC,,, | Performed by: NURSE PRACTITIONER

## 2020-08-31 PROCEDURE — 99999 PR PBB SHADOW E&M-EST. PATIENT-LVL IV: ICD-10-PCS | Mod: PBBFAC,,, | Performed by: NURSE PRACTITIONER

## 2020-08-31 PROCEDURE — 99215 PR OFFICE/OUTPT VISIT, EST, LEVL V, 40-54 MIN: ICD-10-PCS | Mod: S$PBB,,, | Performed by: NURSE PRACTITIONER

## 2020-08-31 PROCEDURE — 99214 OFFICE O/P EST MOD 30 MIN: CPT | Mod: PBBFAC,PO | Performed by: NURSE PRACTITIONER

## 2020-08-31 RX ORDER — OMEPRAZOLE 40 MG/1
40 CAPSULE, DELAYED RELEASE ORAL
Qty: 60 CAPSULE | Refills: 11 | Status: SHIPPED | OUTPATIENT
Start: 2020-08-31 | End: 2021-04-12

## 2020-08-31 NOTE — PROGRESS NOTES
Subjective:       Patient ID: Juhi Taylor is a 70 y.o. female.    Chief Complaint: No chief complaint on file.    HPI   Patient was seen by me 8.5 months ago for left maxillary sinusitis; culture grew no significant isolate. Patient was treated with Augmentin. Patient states her chief concern is constant throat clearing, constant post-nasal drip, thick heavy mucus in the back of her throat, hacking cough. No anterior rhinorrhea or mucopus from nose.   Patient had FESS 3 years ago and has not been able to clear sinus infection in 3 years. She states the culture from during her FESS surgery showed multiple organisms including bacteria and fungus. She was given compounded preparation through [facility] which she did through nasal lavage for several months, but infection has never cleared. All bacterial and fungal cultures were negative (reviewed in Epic). She states infection gets a little better, but then comes right back again upon completion of antibiotics.     Review of Systems   Constitutional: Negative.    HENT: Positive for congestion, postnasal drip (copious, thick, and constant), rhinorrhea (clear, scant), sinus pressure, sinus pain, sore throat (frequently irritated) and voice change. Negative for dental problem, facial swelling, sneezing and trouble swallowing.         Constant throat clearing  Chronic sensation of thick or too much mucus in the back of her throat   Eyes: Negative for discharge and itching.   Respiratory: Negative for cough and choking.    Cardiovascular: Negative.    Gastrointestinal: Negative.    Musculoskeletal: Negative.    Skin: Negative.    Neurological: Negative.  Negative for headaches.   Hematological: Negative.    Psychiatric/Behavioral: Negative.        Objective:      Physical Exam  Vitals signs and nursing note reviewed.   Constitutional:       General: She is not in acute distress.     Appearance: She is well-developed. She is not ill-appearing or  diaphoretic.      Comments: morbidly obese   HENT:      Head: Normocephalic and atraumatic.      Right Ear: Hearing, tympanic membrane, ear canal and external ear normal. No middle ear effusion. Tympanic membrane is not erythematous.      Left Ear: Hearing, tympanic membrane, ear canal and external ear normal.  No middle ear effusion. Tympanic membrane is not erythematous.      Nose: No mucosal edema, congestion or rhinorrhea.      Mouth/Throat:      Mouth: Mucous membranes are not pale, not dry and not cyanotic.      Pharynx: Uvula midline. No pharyngeal swelling, oropharyngeal exudate, posterior oropharyngeal erythema or uvula swelling.      Tonsils: No tonsillar exudate. 2+ on the right. 2+ on the left.   Eyes:      General: Lids are normal. No scleral icterus.        Right eye: No discharge.         Left eye: No discharge.   Neck:      Musculoskeletal: Normal range of motion and neck supple.      Thyroid: No thyroid mass or thyromegaly.      Trachea: Trachea normal. No tracheal deviation.   Cardiovascular:      Rate and Rhythm: Normal rate.   Pulmonary:      Effort: Pulmonary effort is normal. No respiratory distress.      Breath sounds: No stridor. No wheezing.   Musculoskeletal: Normal range of motion.   Lymphadenopathy:      Head:      Right side of head: No submental, submandibular, tonsillar, preauricular or posterior auricular adenopathy.      Left side of head: No submental, submandibular, tonsillar, preauricular or posterior auricular adenopathy.      Cervical: No cervical adenopathy.      Right cervical: No superficial or posterior cervical adenopathy.     Left cervical: No superficial or posterior cervical adenopathy.   Skin:     General: Skin is warm and dry.      Coloration: Skin is not pale.      Findings: No lesion or rash.   Neurological:      Mental Status: She is alert and oriented to person, place, and time.      Coordination: Coordination normal.      Gait: Gait normal.   Psychiatric:          "Speech: Speech normal.         Behavior: Behavior normal. Behavior is cooperative.         Thought Content: Thought content normal.         Judgment: Judgment normal.         Assessment:     S/P FESS (3 years ago, Dr. Juan Pablo Cleary)    Chronic sinusitis with probable immune deficiency vs allergic component  GERD/LPRD, manifested as globus sensation, chronic throat clearing/cough, dysphonia, throat discomfort (scope exam was done a few months ago and not repeated today)  Morbid obesity, BMI 48  Left ETD/sensation of left ear fullness  Plan:     We discussed returning to immunologist (see saw Dr. Cadena 2 years ago for a full immune workup) to determine whether IVIG therapy would be beneficial. She states Dr. Cadena did a full immune workup 2 years ago and recommended influenza vaccine which patient refused because she always gets sick after receiving the flu vaccine.   We discussed turning our attention to GERD/LPRD now. Last time we agreed on plan to clear up sinusitis, and then once improved, focus on GERD/LPRD. Recommended she see GI for possible EGD which patient declined stating she already sees "too many doctors." Offered aggressive anti-reflux regimen of BID PPI X 8 weeks. Omeprazole 40 mg BID sent to her pharmacy. Encouraged her to raise HOB, modify diet, lose weight, etc.   Discussed typical constellation of symptoms seen with some of the more common differentials for chronic PND may include but not limited to: allergic rhinitis (see allergist or take daily allergy meds), silent reflux (see GI or take daily reflux meds), sinusitis (imaging).   Patient asking for oral antibiotics. Reviewed thus far antibiotics have not helped relieve her symptoms sufficiently, and all cultures have been negative. I would recommend CT imaging before determination of need for additional antibiotics.   Patient reminds me again that she refuses to consider any compounded preparations because she was on them for six months " straight without improvement after her FESS.    Discussed ETD/sensation of ear fullness; recommended treating with Flonase, which patient refused stating it burns.   Greater than 50% face-to-face counseling of 45 minute visit spent in review of all of above.

## 2020-08-31 NOTE — PATIENT INSTRUCTIONS
You need to see an immunologist for a full immune system workup to determine whether immune boosting therapy (IVIG) would be beneficial in reducing your sinus infections.      In addition, you also need to see a gastroenterologist for aggressive anti-reflux regimen as GERD can back up all the way into the back of your nose causing sinus infections.      Some of the Top Considerations for Chronic Post-Nasal Drainage:     1. Nasal allergies -- Typical constellation of symptoms seen with nasal allergies: itchy, red, watery eyes; itchy, red, watery nose; excessive sneezing; excessive stuffiness. If this one best describes your current state, then discuss with your primary care provider whether you should see an immunologist/allergist or take daily allergy medications.     2. Sinus Infection -- Typical constellation of symptoms seen with acute bacterial sinus infection are:  Green-gold, foul-smelling, foul-tasting mucus from nose and throat, inability to breathe through nose, inability to smell or taste well, facial pain and swelling, dental pain, headaches around eyes, sore throat and productive cough. If this one best describes your current state, then let's get sinus imaging to rule out infection.     3.  Silent reflux -- Typical constellation of symptoms seen with silent reflux: post-nasal drip sensation with absence of significant runny nose or nasal congestion, sensation of thick or too much mucus in the back of throat, raspy voice, frequent throat clearing, lump in the back of throat, frequent sore throats. If this one best describes your current state, discuss with your primary care provider whether you should see a gastroenterologist or take daily reflux medications. Your GI doctor may want to do an Upper GI or obtain a barium swallow or pH monitoring test.         How Acid Reflux Affects Your Throat    Do you have to clear your throat or cough often? Are you hoarse? Do you have trouble swallowing? If you have  these or other throat symptoms, you may have acid reflux. This occurs when stomach acid flows back up and irritates your throat.  Why you have throat symptoms  There are muscles (esophageal sphincters) at both ends of the tube that carries food to your stomach (the esophagus). These muscles relax to let food pass. Then they tighten to keep stomach acid down. When the lower esophageal sphincter (LES) doesnt tighten enough, acid can flow back (reflux) from your stomach into your esophagus. This may cause heartburn. In some cases the upper esophageal sphincter (UES) also doesnt work well. Then acid can travel higher and enter your throat (pharynx). In many cases, this causes throat symptoms.  Common throat symptoms of Acid Reflux include:  · Needing to clear your throat often  · Feeling like youre choking on phlegm  · Long-term (chronic) cough  · Hoarseness  · Trouble swallowing  · Feel like you have a lump in your throat or thick phlegm in the back of your throat  · Sour or acid taste  · Throat discomfort that keeps coming back

## 2020-09-04 ENCOUNTER — HOSPITAL ENCOUNTER (OUTPATIENT)
Dept: RADIOLOGY | Facility: HOSPITAL | Age: 70
Discharge: HOME | End: 2020-09-04
Attending: NURSE PRACTITIONER
Payer: MEDICARE

## 2020-09-04 DIAGNOSIS — J32.9 CHRONIC SINUSITIS, UNSPECIFIED LOCATION: ICD-10-CM

## 2020-09-04 DIAGNOSIS — Z98.890 STATUS POST FUNCTIONAL ENDOSCOPIC SINUS SURGERY (FESS): ICD-10-CM

## 2020-09-04 PROCEDURE — 70486 CT MAXILLOFACIAL W/O DYE: CPT | Mod: 26,,, | Performed by: RADIOLOGY

## 2020-09-04 PROCEDURE — 70486 CT MEDTRONIC SINUSES WITHOUT: ICD-10-PCS | Mod: 26,,, | Performed by: RADIOLOGY

## 2020-09-04 PROCEDURE — 70486 CT MAXILLOFACIAL W/O DYE: CPT | Mod: TC

## 2020-09-08 DIAGNOSIS — J32.0 CHRONIC MAXILLARY SINUSITIS: Primary | ICD-10-CM

## 2020-09-08 RX ORDER — FLUTICASONE PROPIONATE 50 MCG
1 SPRAY, SUSPENSION (ML) NASAL 2 TIMES DAILY
Qty: 16 G | Refills: 12 | Status: SHIPPED | OUTPATIENT
Start: 2020-09-08 | End: 2021-04-12

## 2020-09-08 RX ORDER — BUDESONIDE 1 MG/2ML
1 INHALANT ORAL 2 TIMES DAILY
Qty: 120 ML | Refills: 12 | Status: ON HOLD | OUTPATIENT
Start: 2020-09-08 | End: 2020-12-05 | Stop reason: HOSPADM

## 2020-09-21 ENCOUNTER — TELEPHONE (OUTPATIENT)
Dept: OTOLARYNGOLOGY | Facility: CLINIC | Age: 70
End: 2020-09-21

## 2020-09-21 ENCOUNTER — LAB VISIT (OUTPATIENT)
Dept: LAB | Facility: HOSPITAL | Age: 70
End: 2020-09-21
Attending: INTERNAL MEDICINE
Payer: MEDICARE

## 2020-09-21 DIAGNOSIS — E03.9 ACQUIRED HYPOTHYROIDISM: ICD-10-CM

## 2020-09-21 DIAGNOSIS — E11.65 TYPE 2 DIABETES MELLITUS WITH HYPERGLYCEMIA, WITHOUT LONG-TERM CURRENT USE OF INSULIN: ICD-10-CM

## 2020-09-21 DIAGNOSIS — N18.9 CRD (CHRONIC RENAL DISEASE): Primary | ICD-10-CM

## 2020-09-21 DIAGNOSIS — E78.5 HYPERLIPIDEMIA, UNSPECIFIED HYPERLIPIDEMIA TYPE: ICD-10-CM

## 2020-09-21 LAB
ESTIMATED AVG GLUCOSE: 174 MG/DL (ref 68–131)
HBA1C MFR BLD HPLC: 7.7 % (ref 4–5.6)

## 2020-09-21 PROCEDURE — 84443 ASSAY THYROID STIM HORMONE: CPT

## 2020-09-21 PROCEDURE — 80069 RENAL FUNCTION PANEL: CPT | Mod: 91

## 2020-09-21 PROCEDURE — 83036 HEMOGLOBIN GLYCOSYLATED A1C: CPT

## 2020-09-21 PROCEDURE — 80069 RENAL FUNCTION PANEL: CPT

## 2020-09-21 PROCEDURE — 36415 COLL VENOUS BLD VENIPUNCTURE: CPT | Mod: PO

## 2020-09-21 PROCEDURE — 84439 ASSAY OF FREE THYROXINE: CPT

## 2020-09-21 PROCEDURE — 80061 LIPID PANEL: CPT

## 2020-09-21 NOTE — TELEPHONE ENCOUNTER
----- Message from Cristina Neal sent at 9/21/2020 10:52 AM CDT -----  Type: Needs Medical Advice  Who Called:  [facility]  Symptoms (please be specific):    How long has patient had these symptoms:   Pharmacy name and phone #:    Best Call Back Number:   Additional Information: requesting a callback asking for code for budesonide

## 2020-09-21 NOTE — TELEPHONE ENCOUNTER
Budesonide is not covered private pay price is 500.00 please change to something else.  Don't know what is covered without a medicine to run thru the system.  Please advise

## 2020-09-22 DIAGNOSIS — E11.65 TYPE 2 DIABETES MELLITUS WITH HYPERGLYCEMIA, WITHOUT LONG-TERM CURRENT USE OF INSULIN: Primary | ICD-10-CM

## 2020-09-22 LAB
ALBUMIN SERPL BCP-MCNC: 2.9 G/DL (ref 3.5–5.2)
ALBUMIN SERPL BCP-MCNC: 3 G/DL (ref 3.5–5.2)
ANION GAP SERPL CALC-SCNC: 10 MMOL/L (ref 8–16)
ANION GAP SERPL CALC-SCNC: 11 MMOL/L (ref 8–16)
BUN SERPL-MCNC: 16 MG/DL (ref 8–23)
BUN SERPL-MCNC: 17 MG/DL (ref 8–23)
CALCIUM SERPL-MCNC: 9.5 MG/DL (ref 8.7–10.5)
CALCIUM SERPL-MCNC: 9.6 MG/DL (ref 8.7–10.5)
CHLORIDE SERPL-SCNC: 105 MMOL/L (ref 95–110)
CHLORIDE SERPL-SCNC: 105 MMOL/L (ref 95–110)
CHOLEST SERPL-MCNC: 119 MG/DL (ref 120–199)
CHOLEST/HDLC SERPL: 2.7 {RATIO} (ref 2–5)
CO2 SERPL-SCNC: 23 MMOL/L (ref 23–29)
CO2 SERPL-SCNC: 24 MMOL/L (ref 23–29)
CREAT SERPL-MCNC: 1 MG/DL (ref 0.5–1.4)
CREAT SERPL-MCNC: 1 MG/DL (ref 0.5–1.4)
EST. GFR  (AFRICAN AMERICAN): >60 ML/MIN/1.73 M^2
EST. GFR  (AFRICAN AMERICAN): >60 ML/MIN/1.73 M^2
EST. GFR  (NON AFRICAN AMERICAN): 58 ML/MIN/1.73 M^2
EST. GFR  (NON AFRICAN AMERICAN): 58 ML/MIN/1.73 M^2
GLUCOSE SERPL-MCNC: 238 MG/DL (ref 70–110)
GLUCOSE SERPL-MCNC: 251 MG/DL (ref 70–110)
HDLC SERPL-MCNC: 44 MG/DL (ref 40–75)
HDLC SERPL: 37 % (ref 20–50)
LDLC SERPL CALC-MCNC: 52 MG/DL (ref 63–159)
NONHDLC SERPL-MCNC: 75 MG/DL
PHOSPHATE SERPL-MCNC: 2.9 MG/DL (ref 2.7–4.5)
PHOSPHATE SERPL-MCNC: 3 MG/DL (ref 2.7–4.5)
POTASSIUM SERPL-SCNC: 4.2 MMOL/L (ref 3.5–5.1)
POTASSIUM SERPL-SCNC: 4.3 MMOL/L (ref 3.5–5.1)
SODIUM SERPL-SCNC: 139 MMOL/L (ref 136–145)
SODIUM SERPL-SCNC: 139 MMOL/L (ref 136–145)
T4 FREE SERPL-MCNC: 1 NG/DL (ref 0.71–1.51)
TRIGL SERPL-MCNC: 115 MG/DL (ref 30–150)
TSH SERPL DL<=0.005 MIU/L-ACNC: 0.8 UIU/ML (ref 0.4–4)

## 2020-09-22 RX ORDER — METFORMIN HYDROCHLORIDE 500 MG/1
1000 TABLET, EXTENDED RELEASE ORAL 2 TIMES DAILY WITH MEALS
Qty: 360 TABLET | Refills: 3 | Status: SHIPPED | OUTPATIENT
Start: 2020-09-22 | End: 2020-11-13 | Stop reason: SDUPTHER

## 2020-10-09 ENCOUNTER — OFFICE VISIT (OUTPATIENT)
Dept: OPTOMETRY | Facility: CLINIC | Age: 70
End: 2020-10-09
Payer: MEDICARE

## 2020-10-09 DIAGNOSIS — E11.9 DIABETES MELLITUS TYPE 2 WITHOUT RETINOPATHY: Primary | ICD-10-CM

## 2020-10-09 DIAGNOSIS — H40.013 OPEN ANGLE WITH BORDERLINE FINDINGS OF BOTH EYES: ICD-10-CM

## 2020-10-09 DIAGNOSIS — E11.36 DIABETIC CATARACT: ICD-10-CM

## 2020-10-09 DIAGNOSIS — H52.7 REFRACTIVE ERROR: ICD-10-CM

## 2020-10-09 DIAGNOSIS — H25.13 NUCLEAR SCLEROSIS, BILATERAL: ICD-10-CM

## 2020-10-09 PROCEDURE — 99213 OFFICE O/P EST LOW 20 MIN: CPT | Mod: PBBFAC,PO | Performed by: OPTOMETRIST

## 2020-10-09 PROCEDURE — 92014 COMPRE OPH EXAM EST PT 1/>: CPT | Mod: S$PBB,,, | Performed by: OPTOMETRIST

## 2020-10-09 PROCEDURE — 99999 PR PBB SHADOW E&M-EST. PATIENT-LVL III: ICD-10-PCS | Mod: PBBFAC,,, | Performed by: OPTOMETRIST

## 2020-10-09 PROCEDURE — 92014 PR EYE EXAM, EST PATIENT,COMPREHESV: ICD-10-PCS | Mod: S$PBB,,, | Performed by: OPTOMETRIST

## 2020-10-09 PROCEDURE — 92015 PR REFRACTION: ICD-10-PCS | Mod: ,,, | Performed by: OPTOMETRIST

## 2020-10-09 PROCEDURE — 99999 PR PBB SHADOW E&M-EST. PATIENT-LVL III: CPT | Mod: PBBFAC,,, | Performed by: OPTOMETRIST

## 2020-10-09 PROCEDURE — 92015 DETERMINE REFRACTIVE STATE: CPT | Mod: ,,, | Performed by: OPTOMETRIST

## 2020-10-09 NOTE — PROGRESS NOTES
HPI     Here for annual Diabetic Eye exam. States BG has been up for a month due   to change in medication. Denies eye pain does not use any eye gtts   regularly occasional Systane.     Lab Results       Component                Value               Date                       HGBA1C                   7.7 (H)             09/21/2020                 HGBA1C                   6.6 (H)             03/09/2020                 HGBA1C                   6.4 (H)             12/09/2019                Last edited by Juve De La Fuente, OD on 10/9/2020  2:13 PM. (History)            Assessment /Plan     For exam results, see Encounter Report.    Diabetes mellitus type 2 without retinopathy    Diabetic cataract    Nuclear sclerosis, bilateral    Open angle with borderline findings of both eyes    Refractive error      DM type 2 w/o ocular retinopathy OU. Discussed possible ocular affects of uncontrolled blood sugar with patient. Recommended continued strong blood sugar control and continued care with PCP. Monitor yearly.     Glaucoma suspect secondary to larger than average CD ratio OU and + fam hx. Discussed with patient. Patient scheduled to RTC for HVF 24-2 HANNY FAST, OCT RNFL, ninh to call.     Mild NS OU, not visually significant. Discussed possible ocular affects of cataracts. Acceptable BCVA OU. Discussed treatment options. Surgery not recommended at this time. Monitor yearly.     Dispensed updated spectacle Rx. Discussed various spectacle lens options. Discussed adaptation period to new specs.       RTC as scheduled for glc eval.

## 2020-10-18 ENCOUNTER — HOSPITAL ENCOUNTER (EMERGENCY)
Facility: HOSPITAL | Age: 70
Discharge: HOME | End: 2020-10-18
Attending: EMERGENCY MEDICINE
Payer: MEDICARE

## 2020-10-18 VITALS
HEART RATE: 65 BPM | WEIGHT: 293 LBS | SYSTOLIC BLOOD PRESSURE: 196 MMHG | DIASTOLIC BLOOD PRESSURE: 118 MMHG | HEIGHT: 67 IN | TEMPERATURE: 99 F | OXYGEN SATURATION: 96 % | RESPIRATION RATE: 18 BRPM | BODY MASS INDEX: 45.99 KG/M2

## 2020-10-18 DIAGNOSIS — R06.02 SOB (SHORTNESS OF BREATH): ICD-10-CM

## 2020-10-18 DIAGNOSIS — N28.89 RENAL MASS, RIGHT: Primary | ICD-10-CM

## 2020-10-18 DIAGNOSIS — R91.1 PULMONARY NODULE, LEFT: ICD-10-CM

## 2020-10-18 LAB
ALBUMIN SERPL BCP-MCNC: 3 G/DL (ref 3.5–5.2)
ALP SERPL-CCNC: 107 U/L (ref 55–135)
ALT SERPL W/O P-5'-P-CCNC: 16 U/L (ref 10–44)
ANION GAP SERPL CALC-SCNC: 13 MMOL/L (ref 8–16)
AST SERPL-CCNC: 15 U/L (ref 10–40)
BACTERIA #/AREA URNS HPF: NORMAL /HPF
BASOPHILS # BLD AUTO: 0.04 K/UL (ref 0–0.2)
BASOPHILS NFR BLD: 0.5 % (ref 0–1.9)
BILIRUB SERPL-MCNC: 0.3 MG/DL (ref 0.1–1)
BILIRUB UR QL STRIP: NEGATIVE
BNP SERPL-MCNC: 547 PG/ML (ref 0–99)
BUN SERPL-MCNC: 15 MG/DL (ref 8–23)
CALCIUM SERPL-MCNC: 9.7 MG/DL (ref 8.7–10.5)
CHLORIDE SERPL-SCNC: 103 MMOL/L (ref 95–110)
CLARITY UR: CLEAR
CO2 SERPL-SCNC: 27 MMOL/L (ref 23–29)
COLOR UR: YELLOW
CREAT SERPL-MCNC: 1.1 MG/DL (ref 0.5–1.4)
DIFFERENTIAL METHOD: ABNORMAL
EOSINOPHIL # BLD AUTO: 0.2 K/UL (ref 0–0.5)
EOSINOPHIL NFR BLD: 2.7 % (ref 0–8)
ERYTHROCYTE [DISTWIDTH] IN BLOOD BY AUTOMATED COUNT: 14.6 % (ref 11.5–14.5)
EST. GFR  (AFRICAN AMERICAN): 60 ML/MIN/1.73 M^2
EST. GFR  (NON AFRICAN AMERICAN): 52 ML/MIN/1.73 M^2
GLUCOSE SERPL-MCNC: 181 MG/DL (ref 70–110)
GLUCOSE UR QL STRIP: NEGATIVE
HCT VFR BLD AUTO: 40.2 % (ref 37–48.5)
HGB BLD-MCNC: 12.1 G/DL (ref 12–16)
HGB UR QL STRIP: ABNORMAL
HYALINE CASTS #/AREA URNS LPF: 1 /LPF
IMM GRANULOCYTES # BLD AUTO: 0.01 K/UL (ref 0–0.04)
IMM GRANULOCYTES NFR BLD AUTO: 0.1 % (ref 0–0.5)
KETONES UR QL STRIP: NEGATIVE
LEUKOCYTE ESTERASE UR QL STRIP: NEGATIVE
LIPASE SERPL-CCNC: 37 U/L (ref 4–60)
LYMPHOCYTES # BLD AUTO: 2.3 K/UL (ref 1–4.8)
LYMPHOCYTES NFR BLD: 28.8 % (ref 18–48)
MCH RBC QN AUTO: 25.9 PG (ref 27–31)
MCHC RBC AUTO-ENTMCNC: 30.1 G/DL (ref 32–36)
MCV RBC AUTO: 86 FL (ref 82–98)
MICROSCOPIC COMMENT: NORMAL
MONOCYTES # BLD AUTO: 0.6 K/UL (ref 0.3–1)
MONOCYTES NFR BLD: 8 % (ref 4–15)
NEUTROPHILS # BLD AUTO: 4.8 K/UL (ref 1.8–7.7)
NEUTROPHILS NFR BLD: 59.9 % (ref 38–73)
NITRITE UR QL STRIP: NEGATIVE
NRBC BLD-RTO: 0 /100 WBC
PH UR STRIP: 7 [PH] (ref 5–8)
PLATELET # BLD AUTO: 278 K/UL (ref 150–350)
PMV BLD AUTO: 9.4 FL (ref 9.2–12.9)
POTASSIUM SERPL-SCNC: 3.7 MMOL/L (ref 3.5–5.1)
PROT SERPL-MCNC: 6.6 G/DL (ref 6–8.4)
PROT UR QL STRIP: ABNORMAL
RBC # BLD AUTO: 4.68 M/UL (ref 4–5.4)
RBC #/AREA URNS HPF: 3 /HPF (ref 0–4)
SARS-COV-2 RDRP RESP QL NAA+PROBE: NEGATIVE
SODIUM SERPL-SCNC: 143 MMOL/L (ref 136–145)
SP GR UR STRIP: 1.02 (ref 1–1.03)
URN SPEC COLLECT METH UR: ABNORMAL
UROBILINOGEN UR STRIP-ACNC: NEGATIVE EU/DL
WBC # BLD AUTO: 8.01 K/UL (ref 3.9–12.7)
WBC #/AREA URNS HPF: 1 /HPF (ref 0–5)

## 2020-10-18 PROCEDURE — 99285 EMERGENCY DEPT VISIT HI MDM: CPT | Mod: 25

## 2020-10-18 PROCEDURE — U0002 COVID-19 LAB TEST NON-CDC: HCPCS

## 2020-10-18 PROCEDURE — 83880 ASSAY OF NATRIURETIC PEPTIDE: CPT

## 2020-10-18 PROCEDURE — 25500020 PHARM REV CODE 255: Performed by: EMERGENCY MEDICINE

## 2020-10-18 PROCEDURE — 83690 ASSAY OF LIPASE: CPT

## 2020-10-18 PROCEDURE — 85025 COMPLETE CBC W/AUTO DIFF WBC: CPT

## 2020-10-18 PROCEDURE — 81000 URINALYSIS NONAUTO W/SCOPE: CPT

## 2020-10-18 PROCEDURE — 36415 COLL VENOUS BLD VENIPUNCTURE: CPT

## 2020-10-18 PROCEDURE — 80053 COMPREHEN METABOLIC PANEL: CPT

## 2020-10-18 RX ORDER — HYDROCODONE BITARTRATE AND ACETAMINOPHEN 5; 325 MG/1; MG/1
1 TABLET ORAL EVERY 4 HOURS PRN
Qty: 18 TABLET | Refills: 0 | Status: ON HOLD | OUTPATIENT
Start: 2020-10-18 | End: 2020-12-05 | Stop reason: HOSPADM

## 2020-10-18 RX ADMIN — IOHEXOL 100 ML: 350 INJECTION, SOLUTION INTRAVENOUS at 08:10

## 2020-10-18 NOTE — ED PROVIDER NOTES
Encounter Date: 10/18/2020    SCRIBE #1 NOTE: I, Anna Lucia, am scribing for, and in the presence of, Dr. Bennett.       History     Chief Complaint   Patient presents with    Abdominal Pain     back pain, SOB, leg swelling x ~1 week; sister + COVID last week       Time seen by provider: 6:06 PM on 10/18/2020    Juhi Taylor is a 70 y.o. female with PMHx of chronic sinusitis, HTN, DM II, HLD, colitis and CAD who presents to the ED with an onset of left abdominal pain for 4 days, right sided back pain and SOB.  Patient describes the abdominal pain as a waxing and waning that is exacerbated with certain movements.  At its worse she rates the pain an 8/10.  She reports the right sided back pain radiates to her right hip.  Patient has a nephrologist that has been monitoring the protein in her urine.  She notes she has a lot of swelling in her legs and took an  Torsemide 1 day ago with Sx continuing.  Patient was on Jardiance but has been off for 1 month.  Her Metformin was adjusted with an increase to 500 mg in the morning and 1000 mg at night.  Last A1C was 7.7, up from 6.  She takes Lisinopril, Amlodipine (low dose) and Labetalol for management of HTN.   Patient confirms diarrhea with 3 episodes per day, last UA had blood in urine and SOB exacerbated with walking.  She reports her sister has COVID-19 and has been living with her.  The patient denies a Hx of CHF although she takes Spironolactone.  She also denies any other symptoms at this time.  PSHx includes cholecystectomy, hysterectomy, breast CA and cardiac catheterization.      The history is provided by the patient.     Review of patient's allergies indicates:   Allergen Reactions    Amitiza [lubiprostone] Other (See Comments)     Does not remember    Irbesartan Swelling    Jardiance [empagliflozin]      Leg cramps    Linzess [linaclotide] Other (See Comments)     Does not remember     Past Medical History:   Diagnosis Date    Cancer      breast    Chronic sinusitis     Colitis     Coronary artery disease 2005    VERY MILD    Diabetes mellitus     Gout     Hyperlipemia     Hypertension     Hypertrophy of nasal turbinates     Multiple thyroid nodules     NICOLE (obstructive sleep apnea)     waiting on CPAP    Thyroid disease      Past Surgical History:   Procedure Laterality Date    BREAST SURGERY      Mastectomy with sentinal LN bx    CARDIAC CATHETERIZATION      CHOLECYSTECTOMY      COLONOSCOPY      ESOPHAGOGASTRODUODENOSCOPY      HAND SURGERY Right     HYSTERECTOMY      MASTECTOMY Bilateral     TUBAL LIGATION       Family History   Problem Relation Age of Onset    Cancer Mother     Stroke Father     Hepatitis Brother     Asthma Daughter     Cancer Maternal Aunt     Glaucoma Sister     Macular degeneration Neg Hx     Retinal detachment Neg Hx      Social History     Tobacco Use    Smoking status: Former Smoker     Quit date: 12/28/2016     Years since quitting: 3.8    Smokeless tobacco: Never Used    Tobacco comment: quit    Substance Use Topics    Alcohol use: No    Drug use: No     Review of Systems   Constitutional: Positive for activity change. Negative for fever.   HENT: Negative for sore throat.    Respiratory: Positive for shortness of breath.    Cardiovascular: Positive for leg swelling. Negative for chest pain.   Gastrointestinal: Positive for abdominal pain and diarrhea. Negative for nausea.   Genitourinary: Negative for dysuria.   Musculoskeletal: Positive for arthralgias and back pain.   Skin: Negative for rash.   Neurological: Negative for weakness.   Hematological: Does not bruise/bleed easily.       Physical Exam     Initial Vitals [10/18/20 1744]   BP Pulse Resp Temp SpO2   (!) 242/107 70 18 98.7 °F (37.1 °C) 99 %      MAP       --         Physical Exam    Nursing note and vitals reviewed.  Constitutional: She appears well-developed and well-nourished. She is Obese .  Non-toxic appearance. No  distress.   HENT:   Head: Normocephalic and atraumatic.   Eyes: EOM are normal. Pupils are equal, round, and reactive to light.   Neck: Normal range of motion. Neck supple. No neck rigidity. No JVD present.   Cardiovascular: Normal rate, regular rhythm, normal heart sounds and intact distal pulses. Exam reveals no gallop and no friction rub.    No murmur heard.  2+ edema to BLE noted.    Pulmonary/Chest: Effort normal and breath sounds normal. No respiratory distress. She has no decreased breath sounds. She has no wheezes. She has no rhonchi. She has no rales.   Abdominal: Soft. Bowel sounds are normal. She exhibits no distension. There is no abdominal tenderness. There is no rebound and no guarding.   Musculoskeletal: Normal range of motion.   Neurological: She is alert and oriented to person, place, and time. She has normal strength and normal reflexes. No cranial nerve deficit or sensory deficit. She exhibits normal muscle tone. Coordination normal. GCS eye subscore is 4. GCS verbal subscore is 5. GCS motor subscore is 6.   Skin: Skin is warm and dry.   Psychiatric: She has a normal mood and affect. Her speech is normal and behavior is normal. She is not actively hallucinating.         ED Course   Procedures  Labs Reviewed   CBC W/ AUTO DIFFERENTIAL - Abnormal; Notable for the following components:       Result Value    Mean Corpuscular Hemoglobin 25.9 (*)     Mean Corpuscular Hemoglobin Conc 30.1 (*)     RDW 14.6 (*)     All other components within normal limits   COMPREHENSIVE METABOLIC PANEL - Abnormal; Notable for the following components:    Glucose 181 (*)     Albumin 3.0 (*)     eGFR if non  52 (*)     All other components within normal limits   URINALYSIS, REFLEX TO URINE CULTURE - Abnormal; Notable for the following components:    Protein, UA 3+ (*)     Occult Blood UA Trace (*)     All other components within normal limits    Narrative:     Specimen Source->Urine   B-TYPE NATRIURETIC  PEPTIDE - Abnormal; Notable for the following components:     (*)     All other components within normal limits   LIPASE   SARS-COV-2 RNA AMPLIFICATION, QUAL   URINALYSIS MICROSCOPIC    Narrative:     Specimen Source->Urine          Imaging Results           CT Abdomen Pelvis With Contrast (Final result)  Result time 10/18/20 20:21:56    Final result by Hussein Pimentel MD (10/18/20 20:21:56)                 Impression:      This report was flagged in Epic as abnormal.    1. Solid-appearing mass within the upper pole of the right kidney, concerning for malignancy, correlation is advised, this is new since examination 02/13/2015.  2. Large pulmonary nodule within the left upper lobe, given the renal findings, is most concerning for metastatic disease, correlation is advised.  3. Right nonobstructive nephrolithiasis.  4. Findings suggesting hepatic steatosis, correlation with LFTs recommended noting hepatomegaly.  5. Several additional findings above.      Electronically signed by: Hussein Pimentel MD  Date:    10/18/2020  Time:    20:21             Narrative:    EXAMINATION:  CT ABDOMEN PELVIS WITH CONTRAST    CLINICAL HISTORY:  LLQ abdominal pain, diverticulitis suspected;    TECHNIQUE:  Low dose axial images, sagittal and coronal reformations were obtained from the lung bases to the pubic symphysis following the IV administration of 100 mL of Omnipaque 350 .  Oral contrast was not given.    COMPARISON:  02/13/2015.    FINDINGS:  Images of the lower thorax are remarkable for bilateral dependent atelectasis.  There is a pulmonary nodule within the anterior aspect of the left lower lobe measuring up to 2 cm.    The liver is hypoattenuating suggesting steatosis, correlation with LFTs recommended noting the liver is mildly enlarged.  There is slight nodular contour to the left hepatic lobe.  The spleen, pancreas, and adrenal glands are unremarkable.  Pancreatic duct is not dilated.  The gallbladder is surgically  absent.  There is no biliary dilation or ascites.  The portal vein, splenic vein, SMV, celiac axis and SMA all are patent.    The kidneys enhance symmetrically without hydronephrosis.  There is right nonobstructing nephrolithiasis.  There is a low attenuating lesion arising from the interpolar region of the right kidney measuring 2.2 cm, attenuation of which is inaccurate as there is artifact from patient's body wall in contact with the gantry at this level.  Within the upper pole/upper region of the interpolar region of the right kidney, there is a solid-appearing mass measuring 3.6 cm, new since most recent comparative examination of 2015.  The bilateral ureters are unremarkable without calculi seen.  The urinary bladder is unremarkable.    The uterus and adnexa is unremarkable.  The distal large bowel is decompressed.  There are a few scattered colonic diverticula without surrounding inflammation to suggest diverticulitis.  The terminal ileum and appendix are unremarkable.  The small bowel is grossly unremarkable.  There is haziness at the mesenteric root, similar in appearance to the previous examination, nonspecific.  There are a few scattered nonenlarged mesenteric lymph nodes as well as in the periaortic and paracaval distribution.  There is atherosclerotic calcification of the aorta and its branches.  No focal organized pelvic fluid collection.    Degenerative changes are noted of the bilateral femoroacetabular joints, pubic symphysis, sacroiliac joints, and spine.  No significant inguinal lymphadenopathy.                               X-Ray Chest AP Portable (Final result)  Result time 10/19/20 09:00:33    Final result by Tonja Ryan MD (10/19/20 09:00:33)                 Impression:      New mild bilateral infiltrates more so right infrahilar.  Differential includes CHF and pneumonia      Electronically signed by: Tonja Ryan MD  Date:    10/19/2020  Time:    09:00             Narrative:     "EXAMINATION:  XR CHEST AP PORTABLE    CLINICAL HISTORY:  Shortness of breath    TECHNIQUE:  Single frontal view of the chest was performed.    COMPARISON:  08/18/2017    FINDINGS:  The cardiomediastinal silhouette is stable.  There are mild perihilar/infrahilar infiltrates more so right medial lung base.  No pleural effusion                                 Medical Decision Making:   History:   Old Medical Records: I decided to obtain old medical records.  Initial Assessment:   Patient with history of breast cancer status post bilateral mastectomy complaining of 4 days of left-sided abdominal pain, back pain and some shortness of breath which is only on exertion.  Patient took diuretic at home for edema in her lower extremities..  Patient reportedly follows with Dr. Martinez, nephrology due to proteinuria.  No fever or leukocytosis to suspect pneumonia.  Mild pulmonary edema on chest x-ray.  No evidence of hypoxia or respiratory distress.  I do not believe she is in decompensated congestive heart failure.  She does not have any significant acute kidney injury.  CT abdomen pelvis with contrast performed to evaluate the cause of her abdominal pain.  This showed solid appearing mass in the upper pole of the right kidney concerning for malignancy as well as large pulmonary nodule in left upper lobe concerning for metastatic disease.  Hospital admission offered to the patient for further workup and pain control. Patient states pain is well controlled. Pt states "I don't want to stay in the hospital." Patient updated on findings from CT scan and labs - she verbalizes understanding and acceptance.  Patient wishes to follow-up with her nephrologist.  Clinical Tests:   Lab Tests: Ordered and Reviewed  Radiological Study: Reviewed and Ordered            Scribe Attestation:   Scribe #1: I performed the above scribed service and the documentation accurately describes the services I performed. I attest to the accuracy of the " note.     I, Donald Nicholson, personally performed the services described in this documentation. All medical record entries made by the scribe were at my direction and in my presence.  I have reviewed the chart and agree that the record reflects my personal performance and is accurate and complete. Bayron Bennett MD.                  Clinical Impression:     ICD-10-CM ICD-9-CM   1. Renal mass, right  N28.89 593.9   2. SOB (shortness of breath)  R06.02 786.05   3. Pulmonary nodule, left  R91.1 793.11                      Disposition:   Disposition: Discharged  Condition: Stable     ED Disposition Condition    Discharge Stable        ED Prescriptions     Medication Sig Dispense Start Date End Date Auth. Provider    HYDROcodone-acetaminophen (NORCO) 5-325 mg per tablet Take 1 tablet by mouth every 4 (four) hours as needed for Pain. 18 tablet 10/18/2020  Bayron Bennett MD        Follow-up Information     Follow up With Specialties Details Why Contact Info    Paolo Martinez MD Nephrology Schedule an appointment as soon as possible for a visit   [address] [facility]  [facility] [address]  [phone]      [facility] Emergency Medicine  As needed, If symptoms worsen [address] [place] [id number] [phone]                                       Bayron Bennett MD  10/23/20 [id number]

## 2020-10-19 NOTE — DISCHARGE INSTRUCTIONS
Please follow-up with her primary care doctor and Dr. Martinez for further evaluation of your renal mass and lung nodule.

## 2020-10-19 NOTE — ASSESSMENT & PLAN NOTE
Patient currently established with Dr. Martinez - in the setting that patient requesting to go home, ER MD discharged patient home with instruction to follow up with Nephrology as soon as possible and to return to the ER if symptoms persist, worsen. Patient believes pain can be controlled adequately at home.

## 2020-10-19 NOTE — SUBJECTIVE & OBJECTIVE
Past Medical History:   Diagnosis Date    Cancer     breast    Chronic sinusitis     Colitis     Coronary artery disease 2005    VERY MILD    Diabetes mellitus     Gout     Hyperlipemia     Hypertension     Hypertrophy of nasal turbinates     Multiple thyroid nodules     NICOLE (obstructive sleep apnea)     waiting on CPAP    Thyroid disease        Past Surgical History:   Procedure Laterality Date    BREAST SURGERY      Mastectomy with sentinal LN bx    CARDIAC CATHETERIZATION      CHOLECYSTECTOMY      COLONOSCOPY      ESOPHAGOGASTRODUODENOSCOPY      HAND SURGERY Right     HYSTERECTOMY      MASTECTOMY Bilateral     TUBAL LIGATION         Review of patient's allergies indicates:   Allergen Reactions    Amitiza [lubiprostone] Other (See Comments)     Does not remember    Irbesartan Swelling    Jardiance [empagliflozin]      Leg cramps    Linzess [linaclotide] Other (See Comments)     Does not remember       No current facility-administered medications on file prior to encounter.      Current Outpatient Medications on File Prior to Encounter   Medication Sig    allopurinol (ZYLOPRIM) 300 MG tablet Take 300 mg by mouth once daily.    amLODIPine (NORVASC) 10 MG tablet Take 1 tablet (10 mg total) by mouth once daily.    budesonide 1 mg/2 mL NbSp Inhale 1 ampule into the lungs 2 (two) times a day. Add to Neti pot or sinus rinse kit daily    carvedilol (COREG) 25 MG tablet Take 25 mg by mouth 2 (two) times daily with meals.    cefUROXime (CEFTIN) 250 MG tablet     ergocalciferol (VITAMIN D2) 50,000 unit Cap TAKE 1 CAPSULE (50,000 UNITS) BY MOUTH THREE TIMES A WEEK    fluconazole (DIFLUCAN) 150 MG Tab     fluticasone propionate (FLONASE) 50 mcg/actuation nasal spray 1 spray (50 mcg total) by Each Nostril route 2 (two) times daily.    folic acid (FOLVITE) 1 MG tablet Take 1 mg by mouth once daily.    furosemide (LASIX) 20 MG tablet Take 20 mg by mouth daily as needed (edema.).     gabapentin  (NEURONTIN) 300 MG capsule Take 300 mg by mouth every evening.    levocetirizine (XYZAL) 5 MG tablet     levothyroxine (SYNTHROID) 50 MCG tablet     lisinopril (PRINIVIL,ZESTRIL) 40 MG tablet Take 1 tablet (40 mg total) by mouth once daily.    metFORMIN (GLUCOPHAGE-XR) 500 MG ER 24hr tablet Take 2 tablets (1,000 mg total) by mouth 2 (two) times daily with meals.    nystatin-triamcinolone (MYCOLOG II) cream     omeprazole (PRILOSEC) 40 MG capsule Take 1 capsule (40 mg total) by mouth 2 (two) times daily before meals. Take on empty stomach 30-60 minutes before meal    potassium chloride (KLOR-CON) 10 MEQ TbSR      Family History     Problem Relation (Age of Onset)    Asthma Daughter    Cancer Mother, Maternal Aunt    Glaucoma Sister    Hepatitis Brother    Stroke Father        Tobacco Use    Smoking status: Former Smoker     Quit date: 12/28/2016     Years since quitting: 3.8    Smokeless tobacco: Never Used    Tobacco comment: quit    Substance and Sexual Activity    Alcohol use: No    Drug use: No    Sexual activity: Not on file     Review of Systems   Constitutional: Positive for activity change and appetite change. Negative for diaphoresis and fever.   HENT: Negative for congestion, ear pain and sore throat.    Respiratory: Positive for shortness of breath. Negative for cough and wheezing.    Cardiovascular: Positive for leg swelling. Negative for chest pain.   Gastrointestinal: Positive for abdominal distention, abdominal pain, diarrhea and nausea. Negative for vomiting.   Genitourinary: Positive for flank pain. Negative for hematuria.   Musculoskeletal: Positive for back pain.   Neurological: Negative for dizziness, syncope and headaches.     Objective:     Vital Signs (Most Recent):  Temp: 98.7 °F (37.1 °C) (10/18/20 1744)  Pulse: 65 (10/18/20 1913)  Resp: 18 (10/18/20 1744)  BP: (!) 196/118 (10/18/20 1913)  SpO2: 96 % (10/18/20 1913) Vital Signs (24h Range):  Temp:  [98.7 °F (37.1 °C)] 98.7  °F (37.1 °C)  Pulse:  [65-70] 65  Resp:  [18] 18  SpO2:  [96 %-99 %] 96 %  BP: (196-242)/(107-118) 196/118     Weight: 136.1 kg (300 lb)  Body mass index is 46.99 kg/m².    Physical Exam  Vitals signs and nursing note reviewed.   Constitutional:       General: She is not in acute distress.     Appearance: She is well-developed. She is not diaphoretic.   HENT:      Head: Normocephalic and atraumatic.   Eyes:      General:         Right eye: No discharge.         Left eye: No discharge.      Pupils: Pupils are equal, round, and reactive to light.   Neck:      Musculoskeletal: Normal range of motion.      Vascular: No JVD.   Cardiovascular:      Rate and Rhythm: Normal rate and regular rhythm.   Pulmonary:      Effort: Pulmonary effort is normal. No respiratory distress.      Comments: On room air, in no acute distress    Chest:      Chest wall: No tenderness.   Abdominal:      General: There is no distension.      Palpations: Abdomen is soft.      Tenderness: There is abdominal tenderness. There is no guarding or rebound.   Genitourinary:     Comments: Exam Deferred     Musculoskeletal: Normal range of motion.         General: Swelling present. No tenderness or deformity.   Skin:     General: Skin is warm and dry.      Capillary Refill: Capillary refill takes 2 to 3 seconds.      Findings: No erythema or rash.      Comments: Status post bilateral mastectomy     Neurological:      Mental Status: She is alert and oriented to person, place, and time.      Cranial Nerves: No cranial nerve deficit.      Sensory: No sensory deficit.   Psychiatric:         Behavior: Behavior normal.         Thought Content: Thought content normal.         Judgment: Judgment normal.         Significant Labs:   CBC:   Recent Labs   Lab 10/18/20  1854   WBC 8.01   HGB 12.1   HCT 40.2        CMP:   Recent Labs   Lab 10/18/20  1854      K 3.7      CO2 27   *   BUN 15   CREATININE 1.1   CALCIUM 9.7   PROT 6.6   ALBUMIN  3.0*   BILITOT 0.3   ALKPHOS 107   AST 15   ALT 16   ANIONGAP 13   EGFRNONAA 52*     Lipase:   Recent Labs   Lab 10/18/20  1854   LIPASE 37        COVID - Negative     Significant Imaging: I have reviewed all pertinent imaging results/findings within the past 24 hours.     CT Abdomen/Pelvis With Contrast:  Impression:       This report was flagged in Epic as abnormal.     1. Solid-appearing mass within the upper pole of the right kidney, concerning for malignancy, correlation is advised, this is new since examination 02/13/2015.   2. Large pulmonary nodule within the left upper lobe, given the renal findings, is most concerning for metastatic disease, correlation is advised.   3. Right nonobstructive nephrolithiasis.   4. Findings suggesting hepatic steatosis, correlation with LFTs recommended noting hepatomegaly.   5. Several additional findings above.

## 2020-10-19 NOTE — HPI
"Juhi Taylor is a pleasant 70-year-old female with a past medical history of hypertension, hyperlipidemia, type 2 diabetes mellitus, coronary artery disease, and colitis who presented to the emergency room tonight with reports of left-sided abdominal pain.  Patient states onset of abdominal pain was approximately 4 days ago, located to the right side, right flank - sharp in characteristic, worse with movement.  Patient also endorses generalized shortness of breath with movement and exertion.   Per ER provider note, "Patient has a nephrologist that has been monitoring the protein in her urine.  She notes she has a lot of swelling in her legs and took an  Torsemide 1 day ago with Sx continuing.  Patient was on Jardiance but has been off for 1 month.  Her Metformin was adjusted with an increase to 500 mg in the morning and 1000 mg at night.  Last A1C was 7.7, up from 6.  She takes Lisinopril, Amlodipine (low dose) and Labetalol for management of HTN.   Patient confirms diarrhea with 3 episodes per day, last UA had blood in urine and SOB exacerbated with walking.  She reports her sister has COVID-19 and has been living with her.  The patient denies a Hx of CHF although she takes Spironolactone.  She also denies any other symptoms at this time.  PSHx includes cholecystectomy, hysterectomy, breast CA and cardiac catheterization."  CT abdomen and pelvis with contrast was obtained in the emergency room revealing, Solid-appearing mass within the upper pole of the right kidney, concerning for malignancy, correlation is advised, this is new since examination 2015. 2. Large pulmonary nodule within the left upper lobe, given the renal findings, is most concerning for metastatic disease, correlation is advised."  Patient with history of breast cancer status post bilateral mastectomy.  Patient reportedly follows with Dr. Martinez, nephrology due to proteinuria. Hospital admission offered to the patient for " "further workup and pain control. Patient states pain is well controlled upon my initial interview and states "I don't want to stay in the hospital." Patient updated on findings from CT scan and labs - she verbalizes understanding and acceptance. Patient noted to be fully capable of making medical decisions.    "

## 2020-10-19 NOTE — CONSULTS
"[facility] Medicine  Consult Note    Patient Name: Juhi Taylor  MRN: [id number]  Admission Date: 10/18/2020  Hospital Length of Stay: 0 days  Attending Physician: No att. providers found   Primary Care Provider: Tony Sadler MD           Patient information was obtained from patient, past medical records and ER records.     Consults  Subjective:     Principal Problem: <principal problem not specified>    Chief Complaint:   Chief Complaint   Patient presents with    Abdominal Pain     back pain, SOB, leg swelling x ~1 week; sister + COVID last week        HPI: Juhi Taylor is a pleasant 70-year-old female with a past medical history of hypertension, hyperlipidemia, type 2 diabetes mellitus, coronary artery disease, and colitis who presented to the emergency room tonight with reports of left-sided abdominal pain.  Patient states onset of abdominal pain was approximately 4 days ago, located to the right side, right flank - sharp in characteristic, worse with movement.  Patient also endorses generalized shortness of breath with movement and exertion.   Per ER provider note, "Patient has a nephrologist that has been monitoring the protein in her urine.  She notes she has a lot of swelling in her legs and took an  Torsemide 1 day ago with Sx continuing.  Patient was on Jardiance but has been off for 1 month.  Her Metformin was adjusted with an increase to 500 mg in the morning and 1000 mg at night.  Last A1C was 7.7, up from 6.  She takes Lisinopril, Amlodipine (low dose) and Labetalol for management of HTN.   Patient confirms diarrhea with 3 episodes per day, last UA had blood in urine and SOB exacerbated with walking.  She reports her sister has COVID-19 and has been living with her.  The patient denies a Hx of CHF although she takes Spironolactone.  She also denies any other symptoms at this time.  PSHx includes cholecystectomy, hysterectomy, breast CA and " "cardiac catheterization."  CT abdomen and pelvis with contrast was obtained in the emergency room revealing, Solid-appearing mass within the upper pole of the right kidney, concerning for malignancy, correlation is advised, this is new since examination 02/13/2015. 2. Large pulmonary nodule within the left upper lobe, given the renal findings, is most concerning for metastatic disease, correlation is advised."  Patient with history of breast cancer status post bilateral mastectomy.  Patient reportedly follows with Dr. Martinez, nephrology due to proteinuria. Hospital admission offered to the patient for further workup and pain control. Patient states pain is well controlled upon my initial interview and states "I don't want to stay in the hospital." Patient updated on findings from CT scan and labs - she verbalizes understanding and acceptance. Patient noted to be fully capable of making medical decisions.      Past Medical History:   Diagnosis Date    Cancer     breast    Chronic sinusitis     Colitis     Coronary artery disease 2005    VERY MILD    Diabetes mellitus     Gout     Hyperlipemia     Hypertension     Hypertrophy of nasal turbinates     Multiple thyroid nodules     NICOLE (obstructive sleep apnea)     waiting on CPAP    Thyroid disease        Past Surgical History:   Procedure Laterality Date    BREAST SURGERY      Mastectomy with sentinal LN bx    CARDIAC CATHETERIZATION      CHOLECYSTECTOMY      COLONOSCOPY      ESOPHAGOGASTRODUODENOSCOPY      HAND SURGERY Right     HYSTERECTOMY      MASTECTOMY Bilateral     TUBAL LIGATION         Review of patient's allergies indicates:   Allergen Reactions    Amitiza [lubiprostone] Other (See Comments)     Does not remember    Irbesartan Swelling    Jardiance [empagliflozin]      Leg cramps    Linzess [linaclotide] Other (See Comments)     Does not remember       No current facility-administered medications on file prior to encounter.  "     Current Outpatient Medications on File Prior to Encounter   Medication Sig    allopurinol (ZYLOPRIM) 300 MG tablet Take 300 mg by mouth once daily.    amLODIPine (NORVASC) 10 MG tablet Take 1 tablet (10 mg total) by mouth once daily.    budesonide 1 mg/2 mL NbSp Inhale 1 ampule into the lungs 2 (two) times a day. Add to Neti pot or sinus rinse kit daily    carvedilol (COREG) 25 MG tablet Take 25 mg by mouth 2 (two) times daily with meals.    cefUROXime (CEFTIN) 250 MG tablet     ergocalciferol (VITAMIN D2) 50,000 unit Cap TAKE 1 CAPSULE (50,000 UNITS) BY MOUTH THREE TIMES A WEEK    fluconazole (DIFLUCAN) 150 MG Tab     fluticasone propionate (FLONASE) 50 mcg/actuation nasal spray 1 spray (50 mcg total) by Each Nostril route 2 (two) times daily.    folic acid (FOLVITE) 1 MG tablet Take 1 mg by mouth once daily.    furosemide (LASIX) 20 MG tablet Take 20 mg by mouth daily as needed (edema.).     gabapentin (NEURONTIN) 300 MG capsule Take 300 mg by mouth every evening.    levocetirizine (XYZAL) 5 MG tablet     levothyroxine (SYNTHROID) 50 MCG tablet     lisinopril (PRINIVIL,ZESTRIL) 40 MG tablet Take 1 tablet (40 mg total) by mouth once daily.    metFORMIN (GLUCOPHAGE-XR) 500 MG ER 24hr tablet Take 2 tablets (1,000 mg total) by mouth 2 (two) times daily with meals.    nystatin-triamcinolone (MYCOLOG II) cream     omeprazole (PRILOSEC) 40 MG capsule Take 1 capsule (40 mg total) by mouth 2 (two) times daily before meals. Take on empty stomach 30-60 minutes before meal    potassium chloride (KLOR-CON) 10 MEQ TbSR      Family History     Problem Relation (Age of Onset)    Asthma Daughter    Cancer Mother, Maternal Aunt    Glaucoma Sister    Hepatitis Brother    Stroke Father        Tobacco Use    Smoking status: Former Smoker     Quit date: 12/28/2016     Years since quitting: 3.8    Smokeless tobacco: Never Used    Tobacco comment: quit    Substance and Sexual Activity    Alcohol use: No     Drug use: No    Sexual activity: Not on file     Review of Systems   Constitutional: Positive for activity change and appetite change. Negative for diaphoresis and fever.   HENT: Negative for congestion, ear pain and sore throat.    Respiratory: Positive for shortness of breath. Negative for cough and wheezing.    Cardiovascular: Positive for leg swelling. Negative for chest pain.   Gastrointestinal: Positive for abdominal distention, abdominal pain, diarrhea and nausea. Negative for vomiting.   Genitourinary: Positive for flank pain. Negative for hematuria.   Musculoskeletal: Positive for back pain.   Neurological: Negative for dizziness, syncope and headaches.     Objective:     Vital Signs (Most Recent):  Temp: 98.7 °F (37.1 °C) (10/18/20 1744)  Pulse: 65 (10/18/20 1913)  Resp: 18 (10/18/20 1744)  BP: (!) 196/118 (10/18/20 1913)  SpO2: 96 % (10/18/20 1913) Vital Signs (24h Range):  Temp:  [98.7 °F (37.1 °C)] 98.7 °F (37.1 °C)  Pulse:  [65-70] 65  Resp:  [18] 18  SpO2:  [96 %-99 %] 96 %  BP: (196-242)/(107-118) 196/118     Weight: 136.1 kg (300 lb)  Body mass index is 46.99 kg/m².    Physical Exam  Vitals signs and nursing note reviewed.   Constitutional:       General: She is not in acute distress.     Appearance: She is well-developed. She is not diaphoretic.   HENT:      Head: Normocephalic and atraumatic.   Eyes:      General:         Right eye: No discharge.         Left eye: No discharge.      Pupils: Pupils are equal, round, and reactive to light.   Neck:      Musculoskeletal: Normal range of motion.      Vascular: No JVD.   Cardiovascular:      Rate and Rhythm: Normal rate and regular rhythm.   Pulmonary:      Effort: Pulmonary effort is normal. No respiratory distress.      Comments: On room air, in no acute distress    Chest:      Chest wall: No tenderness.   Abdominal:      General: There is no distension.      Palpations: Abdomen is soft.      Tenderness: There is abdominal tenderness. There is no  guarding or rebound.   Genitourinary:     Comments: Exam Deferred     Musculoskeletal: Normal range of motion.         General: Swelling present. No tenderness or deformity.   Skin:     General: Skin is warm and dry.      Capillary Refill: Capillary refill takes 2 to 3 seconds.      Findings: No erythema or rash.      Comments: Status post bilateral mastectomy     Neurological:      Mental Status: She is alert and oriented to person, place, and time.      Cranial Nerves: No cranial nerve deficit.      Sensory: No sensory deficit.   Psychiatric:         Behavior: Behavior normal.         Thought Content: Thought content normal.         Judgment: Judgment normal.         Significant Labs:   CBC:   Recent Labs   Lab 10/18/20  1854   WBC 8.01   HGB 12.1   HCT 40.2        CMP:   Recent Labs   Lab 10/18/20  1854      K 3.7      CO2 27   *   BUN 15   CREATININE 1.1   CALCIUM 9.7   PROT 6.6   ALBUMIN 3.0*   BILITOT 0.3   ALKPHOS 107   AST 15   ALT 16   ANIONGAP 13   EGFRNONAA 52*     Lipase:   Recent Labs   Lab 10/18/20  1854   LIPASE 37        COVID - Negative     Significant Imaging: I have reviewed all pertinent imaging results/findings within the past 24 hours.     CT Abdomen/Pelvis With Contrast:  Impression:       This report was flagged in Epic as abnormal.     1. Solid-appearing mass within the upper pole of the right kidney, concerning for malignancy, correlation is advised, this is new since examination 02/13/2015.   2. Large pulmonary nodule within the left upper lobe, given the renal findings, is most concerning for metastatic disease, correlation is advised.   3. Right nonobstructive nephrolithiasis.   4. Findings suggesting hepatic steatosis, correlation with LFTs recommended noting hepatomegaly.   5. Several additional findings above.          Assessment/Plan:     Right kidney mass  Patient currently established with Dr. Martinez - in the setting that patient requesting to go  home, ER MD discharged patient home with instruction to follow up with Nephrology as soon as possible and to return to the ER if symptoms persist, worsen. Patient believes pain can be controlled adequately at home.        VTE Risk Mitigation (From admission, onward)    None              Thank you for your consult. I will sign off. Please contact us if you have any additional questions.     Time spent seeing patient( greater than 1/2 spent in direct contact) : 45 minutes     Christin Escobar NP  Department of Hospital Medicine   [facility]

## 2020-11-04 ENCOUNTER — TELEPHONE (OUTPATIENT)
Dept: UROLOGY | Facility: CLINIC | Age: 70
End: 2020-11-04

## 2020-11-04 NOTE — TELEPHONE ENCOUNTER
Spoke with patient virtual visit scheduled for tomorrow at 3:40pm. Patient verbally voiced understanding.

## 2020-11-04 NOTE — TELEPHONE ENCOUNTER
----- Message from Heather Forrester sent at 11/4/2020  8:36 AM CST -----  Regarding: Appt request  Contact: Juhi  Name of Caller: Maxi  Reason for Visit/Symptoms: Kidney mass  Best Contact Number or Confirm if Mychart Preferred: [phone]   Preferred Date/Time of Appointment: First available    Interested in Virtual Visit (yes/no): No  Additional Information:  Needing to see her regarding kidney mass

## 2020-11-05 ENCOUNTER — TELEPHONE (OUTPATIENT)
Dept: HEMATOLOGY/ONCOLOGY | Facility: CLINIC | Age: 70
End: 2020-11-05

## 2020-11-05 ENCOUNTER — OFFICE VISIT (OUTPATIENT)
Dept: UROLOGY | Facility: CLINIC | Age: 70
End: 2020-11-05
Payer: MEDICARE

## 2020-11-05 DIAGNOSIS — Z85.3 HISTORY OF BREAST CANCER: ICD-10-CM

## 2020-11-05 DIAGNOSIS — N28.1 RENAL CYST: ICD-10-CM

## 2020-11-05 DIAGNOSIS — N28.89 RENAL MASS, RIGHT: Primary | ICD-10-CM

## 2020-11-05 DIAGNOSIS — R91.1 LUNG NODULE: ICD-10-CM

## 2020-11-05 PROCEDURE — 99204 OFFICE O/P NEW MOD 45 MIN: CPT | Mod: 95,,, | Performed by: UROLOGY

## 2020-11-05 PROCEDURE — 99204 PR OFFICE/OUTPT VISIT, NEW, LEVL IV, 45-59 MIN: ICD-10-PCS | Mod: 95,,, | Performed by: UROLOGY

## 2020-11-05 NOTE — PATIENT INSTRUCTIONS
She was referred for a 3.6cm right upper pole renal mass suspicious for renal cell carcinoma that appears new since 2015 ct and ultrasound in 2019 but would have been difficult to see bc it is endophytic. The renal mass is likely RCC and not metastatic but unclear.     She has a h/o breast cancer (stage? Treated by  in 2014) with b mastectomy and chemotherapy and hasn't been seen by  Them since 1 year post-op?    She has a 2cm lung nodule that could either be metastatic from breast cancer or renal cell carcinoma. Also need to ensure not infectious.     I have discussed this with  and plan is to obtain a PET Ct whole body which I have ordered and have her see him soon (scheduled for 11/10).  -she may need a biopsy of both the lung and kidney masses and we  will need to coordinate this with .   -or could start with lung biopsy and if lung biopsy shows RCC then we know the kidney is RCC.  -if she has solitary lung nodule and a solitary renal mass and it's confirmed RCC then could be a candidate possibly for a metastectomy and partial nephrectomy vs nephrectomy vs cryoablation. Will loop one of my partners in to see if she would be a good surgical candidate. Difficult location.   -once pet scan done and  sees pt will loop  in to make plan for biopsy if deemed appropriate

## 2020-11-05 NOTE — TELEPHONE ENCOUNTER
Spoke with patient regarding oncology referral from Dr. Sierra.  Patient requests VV 11/10/2020 with Dr. Monterroso at 1120.  Patient has participated in [facility] several times and understands how to sign in and initiate visit.  No questions at this time.

## 2020-11-05 NOTE — PROGRESS NOTES
[facility] - [place]  Staff: MD Mariela  PCP: Tony Sadler MD    MyChart Utilization: active    Chief Complaint: see below      Subjective:        HPI: Juhi Taylor is a 70 y.o. female     She was seen in the ER for abdominal pain and had a CTAP with contrast on 10/18/20 and it showed a RUP 3.6cm endophytic solid appearing tumor in addition to Rmp 2cm simple cyst and a Left lower lobe 2cm pulmonary nodule suspicious for metastatic dz. She had an US of the kidneys in 12/2019 which only showed the cyst (but would have been difficult to see mass bc endophytic) and a CTAP w wo in 2015 which did not show any renal masses.     She had a h/o breast cancer dx in 2014 s/p b mastectomy 2015 and chemotherapy treated by  in 2016 and  . She saw an on oncologist for a year and then her pcp yearly since then.     She does have some SOB but usually improves with fluid pill rx by her pcp. She has a 40 pack year history of smoking and quit in 2016.    Sees  for CAD. Last saw him more than 6 months ago.   Sees  for diabetes and hypothyroid. a1c 7.7 bc medicine was changed. Has f/u Scheduled this month on jardiance.    Sees  for proteinuria. Kd function normal. Was released for now. Last saw him 2 months ago    She's had lap cholecystectomy, abd hysterectomy, B mastectomy and B TL.   On no blood thinners.       Urine history  10/18/20 3+prot/tr bld, 3 rbc/1 wbc  9/21/20 3+prot/1+bld, 9 rbc/4 wbc/many bact/1sq  7/28/20 Neg  1/31/20 2+ prot  3/19/09 MO    Current REVIEW OF SYSTEMS:  General ROS: no fevers, no chills  Psychological ROS: no depression  Endocrine ROS: no diabetes  Respiratory ROS: no SOB  Cardiovascular ROS: no CP  Gastrointestinal ROS: no abdominal pain, no constipation, no diarrhea  Musculoskeletal ROS: no muscle pain  Neurological ROS: no headaches  Dermatological ROS: no rashes  HEENT: no glasses, no sinus   ROS:  per HPI         PMHx:  Past Medical History:   Diagnosis Date    Cancer     breast    Chronic sinusitis     Colitis     Coronary artery disease 2005    VERY MILD    Diabetes mellitus     Gout     Hyperlipemia     Hypertension     Hypertrophy of nasal turbinates     Multiple thyroid nodules     NICOLE (obstructive sleep apnea)     waiting on CPAP    Thyroid disease        PSHx:  Past Surgical History:   Procedure Laterality Date    BREAST SURGERY      Mastectomy with sentinal LN bx    CARDIAC CATHETERIZATION      CHOLECYSTECTOMY      COLONOSCOPY      ESOPHAGOGASTRODUODENOSCOPY      HAND SURGERY Right     HYSTERECTOMY      MASTECTOMY Bilateral     TUBAL LIGATION         Stents/Valves/Foreign Bodies/Cardiac Evaluation/Cardiologist:     Family History   Problem Relation Age of Onset    Cancer Mother     Stroke Father     Hepatitis Brother     Asthma Daughter     Cancer Maternal Aunt     Glaucoma Sister     Macular degeneration Neg Hx     Retinal detachment Neg Hx       malignancies: none.   kidney stones: none    Soc Hx:  Social History     Tobacco Use    Smoking status: Former Smoker     Quit date: 2016     Years since quitting: 3.8    Smokeless tobacco: Never Used    Tobacco comment: quit    Substance Use Topics    Alcohol use: No    Drug use: No     Lives in: [place]  :single  Children:  Patient's occupation: retired psych aide supervisor     Allergies:  Amitiza [lubiprostone], Irbesartan, Jardiance [empagliflozin], and Linzess [linaclotide]    Anticoagulation/Aspirin:     Objective:     There were no vitals filed for this visit.    No vitals or exam beyond what is listed below performed due to video visit.  Neuro: awake, alert and oriented  Musculoskeletal: normal range of motion    LABS REVIEW:  Recent Labs   Lab 20  1237 20  1233 10/18/20  1854   WBC 8.15 6.59 8.01   Hemoglobin 12.1 12.7 12.1   Hematocrit 42.2 44.4 40.2   Platelets 311  306 278   ]  Recent Labs   Lab 06/01/19  1045 09/11/19  1103  01/31/20  1237 03/09/20  1110 07/28/20  1233 09/21/20  1014 09/21/20  1036 10/18/20  1854   Sodium 142  142 141   < > 137 142 142 139 139 143   Potassium 4.0  4.0 3.9   < > 4.5 4.4 4.5 4.3 4.2 3.7   Chloride 107  107 106   < > 105 107 108 105 105 103   CO2 25  25 25   < > 23 26 23 24 23 27   BUN 15  15 19   < > 22 17 20 16 17 15   Creatinine 0.8  0.8 0.9   < > 1.0 1.0 1.1 1.0 1.0 1.1   Glucose 108  108 117 H   < > 93 122 H 187 H 238 H 251 H 181 H   Calcium 10.5  10.5 10.2   < > 10.1 9.9 10.7 H 9.6 9.5 9.7   Magnesium 1.7 1.8  --  1.8  --   --   --   --   --    Phosphorus 3.2  --    < > 2.9  --  4.0 3.0 2.9  --    Alkaline Phosphatase 98 98  --   --  99  --   --   --  107   Total Protein 6.9 7.4  --   --  6.9  --   --   --  6.6   Albumin 3.3 L  3.3 L 3.4 L   < > 3.3 L 3.2 L 3.3 L 2.9 L 3.0 L 3.0 L   Total Bilirubin 0.4 0.3  --   --  0.3  --   --   --  0.3   AST 21 17  --   --  24  --   --   --  15   ALT 22 15  --   --  31  --   --   --  16    < > = values in this interval not displayed.   ]    Lab Results   Component Value Date    HGBA1C 7.7 (H) 09/21/2020        Recent Pertinent urologic PATHOLOGY REVIEW:  See hpi       Recent Pertinent Urologic RADIOGRAPHIC REVIEW: (remainder of images reviewed)  See hpi    Assessment:       1. Renal mass, right    2. Renal cyst    3. Lung nodule    4. History of breast cancer          Plan:     There are no diagnoses linked to this encounter.    She was referred for a 3.6cm right upper pole renal mass suspicious for renal cell carcinoma that appears new since 2015 ct and ultrasound in 2019 but would have been difficult to see bc it is endophytic. The renal mass is likely RCC and not metastatic but unclear.     She has a h/o breast cancer (stage? Treated by  in 2014) with b mastectomy and chemotherapy and hasn't been seen by  Them since 1 year post-op?    She has a 2cm lung nodule that could either  be metastatic from breast cancer or renal cell carcinoma. Also need to ensure not infectious.     I have discussed this with  and plan is to obtain a PET Ct whole body which I have ordered and have her see him soon (scheduled for 11/10).  -she may need a biopsy of both the lung and kidney masses and we  will need to coordinate this with .   -or could start with lung biopsy and if lung biopsy shows RCC then we know the kidney is RCC.  -if she has solitary lung nodule and a solitary renal mass and it's confirmed RCC then could be a candidate possibly for a metastectomy and partial nephrectomy vs nephrectomy vs cryoablation. Will loop one of my partners in to see if she would be a good surgical candidate. Difficult location.   -once pet scan done and  sees pt will loop  in to make plan for biopsy if deemed appropriate                  Irais Sierra MD

## 2020-11-06 ENCOUNTER — TELEPHONE (OUTPATIENT)
Dept: UROLOGY | Facility: CLINIC | Age: 70
End: 2020-11-06

## 2020-11-06 DIAGNOSIS — N28.1 RENAL CYST: ICD-10-CM

## 2020-11-06 DIAGNOSIS — Z85.3 HISTORY OF BREAST CANCER: ICD-10-CM

## 2020-11-06 DIAGNOSIS — N28.89 RENAL MASS, RIGHT: Primary | ICD-10-CM

## 2020-11-06 DIAGNOSIS — R91.1 LUNG NODULE: ICD-10-CM

## 2020-11-09 ENCOUNTER — HOSPITAL ENCOUNTER (OUTPATIENT)
Dept: RADIOLOGY | Facility: HOSPITAL | Age: 70
Discharge: HOME | End: 2020-11-09
Attending: UROLOGY
Payer: MEDICARE

## 2020-11-09 ENCOUNTER — TELEPHONE (OUTPATIENT)
Dept: HEMATOLOGY/ONCOLOGY | Facility: CLINIC | Age: 70
End: 2020-11-09

## 2020-11-09 VITALS — BODY MASS INDEX: 45.99 KG/M2 | WEIGHT: 293 LBS | HEIGHT: 67 IN

## 2020-11-09 DIAGNOSIS — N28.89 RENAL MASS, RIGHT: ICD-10-CM

## 2020-11-09 DIAGNOSIS — Z85.3 HISTORY OF BREAST CANCER: ICD-10-CM

## 2020-11-09 DIAGNOSIS — R91.1 LUNG NODULE: ICD-10-CM

## 2020-11-09 DIAGNOSIS — N28.1 RENAL CYST: ICD-10-CM

## 2020-11-09 LAB — GLUCOSE SERPL-MCNC: 144 MG/DL (ref 70–110)

## 2020-11-09 PROCEDURE — 82962 GLUCOSE BLOOD TEST: CPT | Mod: PO

## 2020-11-09 PROCEDURE — 78815 PET IMAGE W/CT SKULL-THIGH: CPT | Mod: TC,PO,PI

## 2020-11-09 NOTE — PROGRESS NOTES
Looks like she will need tx for infection vs biopsy of lung nodule to determine if RCC or metastatic breast

## 2020-11-10 ENCOUNTER — TELEPHONE (OUTPATIENT)
Dept: UROLOGY | Facility: CLINIC | Age: 70
End: 2020-11-10

## 2020-11-10 ENCOUNTER — OFFICE VISIT (OUTPATIENT)
Dept: HEMATOLOGY/ONCOLOGY | Facility: CLINIC | Age: 70
End: 2020-11-10
Payer: MEDICARE

## 2020-11-10 DIAGNOSIS — Z85.3 HISTORY OF BREAST CANCER: ICD-10-CM

## 2020-11-10 DIAGNOSIS — I10 ESSENTIAL HYPERTENSION: Primary | ICD-10-CM

## 2020-11-10 DIAGNOSIS — R91.1 LUNG NODULE SEEN ON IMAGING STUDY: ICD-10-CM

## 2020-11-10 DIAGNOSIS — N28.89 RENAL MASS: ICD-10-CM

## 2020-11-10 PROCEDURE — 99205 OFFICE O/P NEW HI 60 MIN: CPT | Mod: 95,,, | Performed by: INTERNAL MEDICINE

## 2020-11-10 PROCEDURE — 99205 PR OFFICE/OUTPT VISIT, NEW, LEVL V, 60-74 MIN: ICD-10-PCS | Mod: 95,,, | Performed by: INTERNAL MEDICINE

## 2020-11-10 NOTE — TELEPHONE ENCOUNTER
Spoke with  who saw pt today. Pt refusing lung biopsy.     I then called pt today and had a long discussion with patient:    If lung nodule was + for RCC would recommend cytoreductive nephrectomy and radiation to lung mass    If lung nodule was + for BC, then renal mass would have had a nephrectomy when she should have been treated for metastatic breast cancer.     Since she is declining lung biopsy then  is recommending re-scan in 2 months with pet CT to see if there are any changes, meanwhile can do cytoreductive.    Would benefit from kidney biopsy, but pt now she's worry about tumor seeding. I explained that the of tumor seeding is reported to be less than 1:10.000. She does not want this.   She is however willing to do a cryoablation and biopsy but understands again that if it was found to be metastatic breast cancer she would have been treated incorrectly.   Therefore I still recommend at minimum a kidney biopsy however review of the literature shows that many of the metastatic breast cancers were treated with nephrectomy  -will check with interventional radiology to see if mass amenable to cryo however at this point she says she would not want yet    She says she's concerned she has an infection and has been coughing up green mucus since sinus surgery 3 years ago. She says she's hoarse and had been on abx for 6 months. Was told by  that she has acid reflux causing her hoarse voice. Not sure if she would benefit from seeing a pulmonologist to see if she needs antibiotics or her ENT. Will defer back to them.   -sent message to  and  '    She says that she has R flank pain that is shooting and thinks it is the stone which is non-obstructing    At the end of our conversation the patient states that she wants to see pulmonology today    Pt does not want to make f/u for her renal mass with me right now  She wants to repeat lung CT. She also wants to see what pulmonologist  said  I did let her know that we could still proceed with treating the kidney without a kidney biopsy or lung biopsy beforehand and that she has risk of mets     I offered to make a f/u to discuss but she does not want to make a f/u right now     After discussion with patient:  Discussed with IR and renal mass is amenable to biopsy and cryo  Discussed with  and if lung biopsy and/or renal biopsy confirms not metastatic from breast then mass amenable to partial nephrectomy

## 2020-11-10 NOTE — LETTER
November 10, 2020      Irais Sierra MD  [address] [place] [address] [address]  [address]  [facility] [id number]

## 2020-11-10 NOTE — PROGRESS NOTES
The patient location is: home  The chief complaint leading to consultation is:  Lung lesion renal mass  Visit type: audiovisual    Face to Face time with patient: 15  20 minutes of total time spent on the encounter, which includes face to face time and non-face to face time preparing to see the patient (eg, review of tests), Obtaining and/or reviewing separately obtained history, Documenting clinical information in the electronic or other health record, Independently interpreting results (not separately reported) and communicating results to the patient/family/caregiver, or Care coordination (not separately reported).         Each patient to whom he or she provides medical services by telemedicine is:  (1) informed of the relationship between the physician and patient and the respective role of any other health care provider with respect to management of the patient; and (2) notified that he or she may decline to receive medical services by telemedicine and may withdraw from such care at any time.    Notes:     HPI    70 years old female referred to Hematology Oncology for evaluation of lung lesion.  PET-CT obtained on 11/09/2020 shows none increased FDG activity at superior right renal mass.  There is also unchanged 20 mm pleural-based nodule left lower lobe without increase of FDG activities.  Patient has history of breast cancer which had a bilateral mastectomy at the time.  On this visit patient denies any chest pain shortness breath.  Patient denies any abdominal pain vomiting.  No fever no chills.  No weight loss.  No appetite change.    Patient reports chronic sinusitis which she previously reported had a surgery done    Past Medical History:   Diagnosis Date    Cancer     breast    Chronic sinusitis     Colitis     Coronary artery disease 2005    VERY MILD    Diabetes mellitus     Gout     Hyperlipemia     Hypertension     Hypertrophy of nasal turbinates     Multiple thyroid nodules     NICOLE  (obstructive sleep apnea)     waiting on CPAP    Thyroid disease      Social History     Socioeconomic History    Marital status: Single     Spouse name: Not on file    Number of children: Not on file    Years of education: Not on file    Highest education level: Not on file   Occupational History    Not on file   Social Needs    Financial resource strain: Not on file    Food insecurity     Worry: Not on file     Inability: Not on file    Transportation needs     Medical: Not on file     Non-medical: Not on file   Tobacco Use    Smoking status: Former Smoker     Quit date: 12/28/2016     Years since quitting: 3.8    Smokeless tobacco: Never Used    Tobacco comment: quit    Substance and Sexual Activity    Alcohol use: No    Drug use: No    Sexual activity: Not on file   Lifestyle    Physical activity     Days per week: Not on file     Minutes per session: Not on file    Stress: Not on file   Relationships    Social connections     Talks on phone: Not on file     Gets together: Not on file     Attends Yazidism service: Not on file     Active member of club or organization: Not on file     Attends meetings of clubs or organizations: Not on file     Relationship status: Not on file   Other Topics Concern    Not on file   Social History Narrative    Not on file         Subjective      Review of Systems   Constitutional: Negative for appetite change, fatigue and unexpected weight change.   HENT:  Sinusitis  Eyes: Negative for visual disturbance.   Respiratory: Negative for cough and shortness of breath.   Cardiovascular: Negative for chest pain.   Gastrointestinal: Negative for diarrhea.   Genitourinary: Negative for frequency.   Musculoskeletal: Negative for back pain.   Skin: Negative for rash.   Neurological: Negative for headaches.   Hematological: Negative for adenopathy.   Psychiatric/Behavioral: The patient is not nervous/anxious.   All other systems reviewed and are negative.      Objective    Physical Exam   Virtual visit  Patient is awake alert normal speech pattern  No rash no lesions  Coordination appears intact  Normal affect    CMP  Sodium   Date Value Ref Range Status   10/18/2020 143 136 - 145 mmol/L Final     Potassium   Date Value Ref Range Status   10/18/2020 3.7 3.5 - 5.1 mmol/L Final     Chloride   Date Value Ref Range Status   10/18/2020 103 95 - 110 mmol/L Final     CO2   Date Value Ref Range Status   10/18/2020 27 23 - 29 mmol/L Final     Glucose   Date Value Ref Range Status   10/18/2020 181 (H) 70 - 110 mg/dL Final     BUN   Date Value Ref Range Status   10/18/2020 15 8 - 23 mg/dL Final     Creatinine   Date Value Ref Range Status   10/18/2020 1.1 0.5 - 1.4 mg/dL Final     Calcium   Date Value Ref Range Status   10/18/2020 9.7 8.7 - 10.5 mg/dL Final     Total Protein   Date Value Ref Range Status   10/18/2020 6.6 6.0 - 8.4 g/dL Final     Albumin   Date Value Ref Range Status   10/18/2020 3.0 (L) 3.5 - 5.2 g/dL Final     Total Bilirubin   Date Value Ref Range Status   10/18/2020 0.3 0.1 - 1.0 mg/dL Final     Comment:     For infants and newborns, interpretation of results should be based  on gestational age, weight and in agreement with clinical  observations.  Premature Infant recommended reference ranges:  Up to 24 hours.............<8.0 mg/dL  Up to 48 hours............<12.0 mg/dL  3-5 days..................<15.0 mg/dL  6-29 days.................<15.0 mg/dL       Alkaline Phosphatase   Date Value Ref Range Status   10/18/2020 107 55 - 135 U/L Final     AST   Date Value Ref Range Status   10/18/2020 15 10 - 40 U/L Final     ALT   Date Value Ref Range Status   10/18/2020 16 10 - 44 U/L Final     Anion Gap   Date Value Ref Range Status   10/18/2020 13 8 - 16 mmol/L Final     eGFR if    Date Value Ref Range Status   10/18/2020 60 >60 mL/min/1.73 m^2 Final     eGFR if non    Date Value Ref Range Status   10/18/2020 52 (A) >60 mL/min/1.73 m^2  Final     Comment:     Calculation used to obtain the estimated glomerular filtration  rate (eGFR) is the CKD-EPI equation.        Lab Results   Component Value Date    WBC 8.01 10/18/2020    HGB 12.1 10/18/2020    HCT 40.2 10/18/2020    MCV 86 10/18/2020     10/18/2020     PET-CT  Impression:     1. No increased FDG activity at site of previously identified superior right renal mass.  If definitive imaging characterization is desired, CT abdomen without and with IV contrast per renal protocol can be considered, although the finding remains of concern for primary renal malignancy, possibly low-grade such as papillary carcinoma.  2. Unchanged 20 mm pleural-based nodularity in left lower lobe without increased FDG activity.  Benign etiologies such as atelectasis or infectious or inflammatory process can give this appearance.  Low-grade malignancies such as low-grade metastasis or primary lung malignancy could also give a similar appearance.  Continued CT thorax follow-up without IV contrast is recommended in 3 months.  3. Coronary artery calcifications.    Assessment Plan    Solid mass right upper pole kidney images on 10/18/2020.  This is a new development since the prior examination of 02/13/2015.    Pulmonary nodule left lobe measuring 20 mm with out increased FDG activities.    > recommend pulmonary nodule biopsy to rule out any low-grade malignancies.  > I had long discussion with patient regarding the biopsy.  Unfortunately patient declined lung biopsy.  Given her decision I will repeat CT IV contrast in 3 months for evaluation.  > I will also inform Dr. Sierra regarding patient visit with me today        There are no diagnoses linked to this encounter.

## 2020-11-13 ENCOUNTER — OFFICE VISIT (OUTPATIENT)
Dept: ENDOCRINOLOGY | Facility: CLINIC | Age: 70
End: 2020-11-13
Payer: MEDICARE

## 2020-11-13 ENCOUNTER — TELEPHONE (OUTPATIENT)
Dept: PULMONOLOGY | Facility: CLINIC | Age: 70
End: 2020-11-13

## 2020-11-13 VITALS
HEIGHT: 67 IN | TEMPERATURE: 98 F | HEART RATE: 65 BPM | BODY MASS INDEX: 45.99 KG/M2 | OXYGEN SATURATION: 95 % | SYSTOLIC BLOOD PRESSURE: 130 MMHG | WEIGHT: 293 LBS | DIASTOLIC BLOOD PRESSURE: 76 MMHG

## 2020-11-13 DIAGNOSIS — E79.0 HYPERURICEMIA: ICD-10-CM

## 2020-11-13 DIAGNOSIS — R79.89 ABNORMAL THYROID BLOOD TEST: ICD-10-CM

## 2020-11-13 DIAGNOSIS — G47.33 OSA (OBSTRUCTIVE SLEEP APNEA): ICD-10-CM

## 2020-11-13 DIAGNOSIS — E03.9 ACQUIRED HYPOTHYROIDISM: ICD-10-CM

## 2020-11-13 DIAGNOSIS — E55.9 HYPOVITAMINOSIS D: ICD-10-CM

## 2020-11-13 DIAGNOSIS — N28.89 RIGHT KIDNEY MASS: ICD-10-CM

## 2020-11-13 DIAGNOSIS — R80.8 OTHER PROTEINURIA: ICD-10-CM

## 2020-11-13 DIAGNOSIS — I25.10 CORONARY ARTERY DISEASE INVOLVING NATIVE CORONARY ARTERY OF NATIVE HEART WITHOUT ANGINA PECTORIS: ICD-10-CM

## 2020-11-13 DIAGNOSIS — K21.9 GASTROESOPHAGEAL REFLUX DISEASE, UNSPECIFIED WHETHER ESOPHAGITIS PRESENT: ICD-10-CM

## 2020-11-13 DIAGNOSIS — M10.00 IDIOPATHIC GOUT, UNSPECIFIED CHRONICITY, UNSPECIFIED SITE: ICD-10-CM

## 2020-11-13 DIAGNOSIS — E04.1 NODULAR THYROID DISEASE: ICD-10-CM

## 2020-11-13 DIAGNOSIS — E11.65 TYPE 2 DIABETES MELLITUS WITH HYPERGLYCEMIA, WITHOUT LONG-TERM CURRENT USE OF INSULIN: Primary | ICD-10-CM

## 2020-11-13 DIAGNOSIS — I10 ESSENTIAL HYPERTENSION: ICD-10-CM

## 2020-11-13 DIAGNOSIS — E04.9 GOITER: ICD-10-CM

## 2020-11-13 DIAGNOSIS — E06.9 THYROIDITIS: ICD-10-CM

## 2020-11-13 DIAGNOSIS — Z78.9 STATIN INTOLERANCE: ICD-10-CM

## 2020-11-13 DIAGNOSIS — E21.3 HYPERPARATHYROIDISM: ICD-10-CM

## 2020-11-13 PROCEDURE — 99214 PR OFFICE/OUTPT VISIT, EST, LEVL IV, 30-39 MIN: ICD-10-PCS | Mod: S$PBB,,, | Performed by: INTERNAL MEDICINE

## 2020-11-13 PROCEDURE — 99214 OFFICE O/P EST MOD 30 MIN: CPT | Mod: S$PBB,,, | Performed by: INTERNAL MEDICINE

## 2020-11-13 PROCEDURE — 99214 OFFICE O/P EST MOD 30 MIN: CPT | Mod: PBBFAC,PO | Performed by: INTERNAL MEDICINE

## 2020-11-13 PROCEDURE — 99999 PR PBB SHADOW E&M-EST. PATIENT-LVL IV: CPT | Mod: PBBFAC,,, | Performed by: INTERNAL MEDICINE

## 2020-11-13 PROCEDURE — 99999 PR PBB SHADOW E&M-EST. PATIENT-LVL IV: ICD-10-PCS | Mod: PBBFAC,,, | Performed by: INTERNAL MEDICINE

## 2020-11-13 RX ORDER — METFORMIN HYDROCHLORIDE 500 MG/1
1000 TABLET, EXTENDED RELEASE ORAL 2 TIMES DAILY WITH MEALS
Qty: 360 TABLET | Refills: 3 | Status: SHIPPED | OUTPATIENT
Start: 2020-11-13 | End: 2021-11-16 | Stop reason: SDUPTHER

## 2020-11-13 RX ORDER — METFORMIN HYDROCHLORIDE 500 MG/1
1000 TABLET, EXTENDED RELEASE ORAL 2 TIMES DAILY WITH MEALS
Qty: 360 TABLET | Refills: 3 | Status: SHIPPED | OUTPATIENT
Start: 2020-11-13 | End: 2020-11-13 | Stop reason: SDUPTHER

## 2020-11-13 NOTE — PROGRESS NOTES
Subjective:      Patient ID: Juhi Taylor is a 70 y.o. female.    Chief Complaint:  Hypothyroidism    Patient is a 70 yr postmenopausal lady seen in [facility] today on account of type 2 diabetes and hypothyroidism.    History of Present Illness    The patient, Ms Taylor is a 70 yr old postmenopausal lady who has been previously seen and ffed by Any Adams PA-C seen in [facility] today on account of type 2 diabetes and hypothyroidism.     She  was diagnosed with Type 2 DM ~15 years ago. No hospitalizations for DM. Her daughter has DM. No fhx of thyroid disease.      States Avapro caused swelling and inability to urinate.      Hyperparathyroidism.   No n/v.  Occ abdominal pain. No vomiting. Kidney stones in 2015. She had a CT in 1/19 that was apparently normal.     CURRENT DIABETIC MEDS: jardiance 10mg Qd, metformin 1000 mg   Her most recent HBA1c from 09/2020 was 7.7 which is above goal and had risen compared to prior from 03/2020.  The patient had to stop the jardiance because of recurrent yeast infections and body cramps.      Does not check glucose.   Hypoglycemia: None  Type of Glucose Meter: one touch verio     Physical Activity: none     Dietary Habits: 1-2 meal daily. Drinks water.      Last Eye Exam: 1/19  Last Podiatry Exam: 2/19     Last DM Education Attended: Last year     Thyroid nodule  Dx 5-10 years ago  Bx in 2015 of left thyroid nodule was benign  Last thyroid u/s 6/2020  showed prior noted dominant nodule 1.1 cm in the right lobe which could not be reached on previous attempt at FNA in 12//18 . Left lobe has a 2.2 cm nodule that has not changed since the prior exam. Her next [facility] thyroid USS should be for ~ 06/21.  No new voice changes, sob, dysphagia.      Hypothyroidism  Dx 10 years ago  LT4 50 mcg qd  +  hair loss (hereditary), brittle nails,   No h/c intolerance, palpitations, insomnia, constipation/diarrhea.      NICOLE-wears cpap     Social Hx: smoked 1 ppd for 40 years. She quit 2 years ago.  " No ETOH use.      DEXA 2/19: Osteopenia. Taking ergo 3x weekly. Her next DEXA should be for ~ 02/21.    Review of Systems   Constitutional: Negative for diaphoresis, fever and unexpected weight change.   HENT: Negative for facial swelling and trouble swallowing.    Eyes: Negative for visual disturbance.   Respiratory: Negative for cough and shortness of breath.    Cardiovascular: Negative for chest pain, palpitations and leg swelling.   Gastrointestinal: Positive for abdominal distention (chronic). Negative for abdominal pain, diarrhea and vomiting.   Endocrine: Positive for polyuria.   Genitourinary: Negative for difficulty urinating, frequency and menstrual problem (postmenopausal).   Musculoskeletal: Positive for arthralgias (mainly of the hips R>L), back pain (Chronic) and gait problem (walks with aid of walking stick). Negative for myalgias.   Skin: Negative for color change, pallor and rash.   Neurological: Negative for dizziness and headaches.   Hematological: Does not bruise/bleed easily.       Objective: /76 (BP Location: Right arm, Patient Position: Sitting, BP Method: Medium (Manual))   Pulse 65   Temp 97.6 °F (36.4 °C) (Temporal)   Ht 5' 7" (1.702 m)   Wt (!) 137.5 kg (303 lb 0.4 oz)   SpO2 95%   BMI 47.46 kg/m²  Body surface area is 2.55 meters squared.         Physical Exam  Vitals signs reviewed.   Constitutional:       General: She is not in acute distress.     Appearance: She is well-developed. She is not diaphoretic.      Comments: Pleasant elderly lady. Not pale, anicteric .  Not cyanotic. Has a back brace in situ and ambulates with aid of walking stick.   HENT:      Head: Normocephalic and atraumatic.   Eyes:      General: No scleral icterus.     Conjunctiva/sclera: Conjunctivae normal.      Pupils: Pupils are equal, round, and reactive to light.   Neck:      Musculoskeletal: Normal range of motion and neck supple.      Thyroid: Thyromegaly (chronic) present.   Cardiovascular:      " Rate and Rhythm: Normal rate and regular rhythm.      Heart sounds: Normal heart sounds. No murmur.   Pulmonary:      Effort: Pulmonary effort is normal. No respiratory distress.      Breath sounds: Normal breath sounds. No stridor. No wheezing, rhonchi or rales.   Abdominal:      General: There is distension.      Palpations: Abdomen is soft.      Tenderness: There is no abdominal tenderness. There is no guarding.      Comments: Obese anterior abdominal wall.   Skin:     General: Skin is warm and dry.      Coloration: Skin is not jaundiced or pale.      Findings: No erythema or rash.   Neurological:      General: No focal deficit present.      Mental Status: She is alert and oriented to person, place, and time.      Cranial Nerves: No cranial nerve deficit.   Psychiatric:         Mood and Affect: Mood normal.         Behavior: Behavior normal.         Thought Content: Thought content normal.         Judgment: Judgment normal.         Lab Review:     Results for YOLANDA HUNTLEY (MRN [id number]) as of 11/13/2020 14:16   Ref. Range 10/18/2020 18:35 10/18/2020 18:54 10/18/2020 19:05 10/18/2020 20:12 11/9/2020 13:59   WBC Latest Ref Range: 3.90 - 12.70 K/uL  8.01      RBC Latest Ref Range: 4.00 - 5.40 M/uL  4.68      Hemoglobin Latest Ref Range: 12.0 - 16.0 g/dL  12.1      Hematocrit Latest Ref Range: 37.0 - 48.5 %  40.2      MCV Latest Ref Range: 82 - 98 fL  86      MCH Latest Ref Range: 27.0 - 31.0 pg  25.9 (L)      MCHC Latest Ref Range: 32.0 - 36.0 g/dL  30.1 (L)      RDW Latest Ref Range: 11.5 - 14.5 %  14.6 (H)      Platelets Latest Ref Range: 150 - 350 K/uL  278      MPV Latest Ref Range: 9.2 - 12.9 fL  9.4      Gran % Latest Ref Range: 38.0 - 73.0 %  59.9      Lymph % Latest Ref Range: 18.0 - 48.0 %  28.8      Mono % Latest Ref Range: 4.0 - 15.0 %  8.0      Eosinophil % Latest Ref Range: 0.0 - 8.0 %  2.7      Basophil % Latest Ref Range: 0.0 - 1.9 %  0.5      Immature Granulocytes Latest Ref Range: 0.0 - 0.5  %  0.1      Gran # (ANC) Latest Ref Range: 1.8 - 7.7 K/uL  4.8      Lymph # Latest Ref Range: 1.0 - 4.8 K/uL  2.3      Mono # Latest Ref Range: 0.3 - 1.0 K/uL  0.6      Eos # Latest Ref Range: 0.0 - 0.5 K/uL  0.2      Baso # Latest Ref Range: 0.00 - 0.20 K/uL  0.04      Immature Grans (Abs) Latest Ref Range: 0.00 - 0.04 K/uL  0.01      nRBC Latest Ref Range: 0 /100 WBC  0      Differential Method Unknown  Automated      Sodium Latest Ref Range: 136 - 145 mmol/L  143      Potassium Latest Ref Range: 3.5 - 5.1 mmol/L  3.7      Chloride Latest Ref Range: 95 - 110 mmol/L  103      CO2 Latest Ref Range: 23 - 29 mmol/L  27      Anion Gap Latest Ref Range: 8 - 16 mmol/L  13      BUN Latest Ref Range: 8 - 23 mg/dL  15      Creatinine Latest Ref Range: 0.5 - 1.4 mg/dL  1.1      eGFR if non African American Latest Ref Range: >60 mL/min/1.73 m^2  52 (A)      eGFR if African American Latest Ref Range: >60 mL/min/1.73 m^2  60      Glucose Latest Ref Range: 70 - 110 mg/dL  181 (H)      Calcium Latest Ref Range: 8.7 - 10.5 mg/dL  9.7      Alkaline Phosphatase Latest Ref Range: 55 - 135 U/L  107      PROTEIN TOTAL Latest Ref Range: 6.0 - 8.4 g/dL  6.6      Albumin Latest Ref Range: 3.5 - 5.2 g/dL  3.0 (L)      BILIRUBIN TOTAL Latest Ref Range: 0.1 - 1.0 mg/dL  0.3      AST Latest Ref Range: 10 - 40 U/L  15      ALT Latest Ref Range: 10 - 44 U/L  16      Lipase Latest Ref Range: 4 - 60 U/L  37      BNP Latest Ref Range: 0 - 99 pg/mL  547 (H)      Specimen UA Unknown   Urine, Clean Catch     Color, UA Latest Ref Range: Yellow, Straw, Sherita    Yellow     Appearance, UA Latest Ref Range: Clear    Clear     Specific [place], UA Latest Ref Range: 1.005 - 1.030    1.020     pH, UA Latest Ref Range: 5.0 - 8.0    7.0     Protein, UA Latest Ref Range: Negative    3+ (A)     Glucose, UA Latest Ref Range: Negative    Negative     Ketones, UA Latest Ref Range: Negative    Negative     Occult Blood UA Latest Ref Range: Negative    Trace (A)      NITRITE UA Latest Ref Range: Negative    Negative     UROBILINOGEN UA Latest Ref Range: <2.0 EU/dL   Negative     Bilirubin (UA) Latest Ref Range: Negative    Negative     Leukocytes, UA Latest Ref Range: Negative    Negative     RBC, UA Latest Ref Range: 0 - 4 /hpf   3     WBC, UA Latest Ref Range: 0 - 5 /hpf   1     Bacteria, UA Latest Ref Range: None-Occ /hpf   None     Hyaline Casts, UA Latest Ref Range: 0-1/lpf /lpf   1     Microscopic Comment Unknown   SEE COMMENT     POC Glucose Latest Ref Range: 70 - 110      144 (H)   CT ABDOMEN PELVIS WITH CONTRAST Unknown    Rpt (A)        Assessment:     1. Type 2 diabetes mellitus with hyperglycemia, without long-term current use of insulin  Hemoglobin A1C    GlycoMark (TM)    Fructosamine    Uric Acid    metFORMIN (GLUCOPHAGE-XR) 500 MG ER 24hr tablet    SITagliptin (JANUVIA) 100 MG Tab    Cytology, Urine    DISCONTINUED: metFORMIN (GLUCOPHAGE-XR) 500 MG ER 24hr tablet   2. Nodular thyroid disease     3. Acquired hypothyroidism     4. Hyperparathyroidism     5. Goiter     6. Abnormal thyroid blood test     7. Hypovitaminosis D     8. Right kidney mass  Cytology, Urine   9. Other proteinuria     10. Hyperuricemia     11. Idiopathic gout, unspecified chronicity, unspecified site     12. Statin intolerance     13. NICOLE (obstructive sleep apnea)  Aldosterone    Renin   14. Thyroiditis     15. Essential hypertension  Uric Acid    Microalbumin/Creatinine Ratio, Urine   16. Coronary artery disease involving native coronary artery of native heart without angina pectoris     17. Gastroesophageal reflux disease, unspecified whether esophagitis present          Thyroid nodule-nodules have been stable in size-repeat thyroid u/s 6/20. Pt declines surgery.  Yiwqjfnfxvitpn-toerxh-zjdnjcfd LT4 dose (50mcg QD). TFTs wnl.  T2DM- to continue metformin 1g  BID and instead start januvia 100mg QD as she was unable to tolerate Jardiance.  HTN-suboptimal control-Advised to track ambulatory  BP and pulse rates and provide same to Dr Mendez for adjustment of her antihypertensive therapu   HLD-stable-statin intolerant. To continue Praulent 75 mg q 2 weeks.   Hypovitaminosis D- vd is normal. Continue ergo.  [id number] CPAP  Hyperparathryoidism-stable-monitor. Avoid calcium supplements. Increase intake of water. Calcium is normal.   Gout-elevated-continue allopurinol. Increase intake of water. Recheck next visit.   Microalbuminuria-kidney u/s. Referral to nephrology. Repeat mac.   Regarding righ renal mass; ongoing ffup and evaluation as per Urology service.    Plan:       FFup in ~ 4mths

## 2020-11-13 NOTE — TELEPHONE ENCOUNTER
----- Message from Hermelinda Ribera MA sent at 11/11/2020  2:34 PM CST -----  Regarding: FW: needs pulmonology appt soon please    ----- Message -----  From: Kendall Brady MD  Sent: 11/11/2020   2:15 PM CST  To: Hermelinda Ribera MA  Subject: RE: needs pulmonology appt soon please           Let's get her in here in the next week - can be a 1 PM appointment or Friday 11/20  ----- Message -----  From: Hermelinda Ribera MA  Sent: 11/11/2020   7:42 AM CST  To: Kendall Brady MD  Subject: FW: needs pulmonology appt soon please             ----- Message -----  From: Irais Seirra MD  Sent: 11/10/2020  12:59 PM CST  To: Joel Monterroso MD, Caitlyn Argueta Staff, #  Subject: needs pulmonology appt soon please               Pt has a lung nodule that she does not want biopsied bc she thinks it's infection. Would like their input if she should try antibiotics based on her sinus issues/coughing up phlegm etc. She also has a renal mass (details in our notes)

## 2020-11-16 ENCOUNTER — LAB VISIT (OUTPATIENT)
Dept: LAB | Facility: HOSPITAL | Age: 70
End: 2020-11-16
Attending: INTERNAL MEDICINE
Payer: MEDICARE

## 2020-11-16 DIAGNOSIS — E11.65 TYPE 2 DIABETES MELLITUS WITH HYPERGLYCEMIA, WITHOUT LONG-TERM CURRENT USE OF INSULIN: ICD-10-CM

## 2020-11-16 DIAGNOSIS — G47.33 OSA (OBSTRUCTIVE SLEEP APNEA): ICD-10-CM

## 2020-11-16 DIAGNOSIS — N18.2 CHRONIC KIDNEY DISEASE, STAGE II (MILD): Primary | ICD-10-CM

## 2020-11-16 DIAGNOSIS — I10 ESSENTIAL HYPERTENSION: ICD-10-CM

## 2020-11-16 LAB
ALBUMIN SERPL BCP-MCNC: 3 G/DL (ref 3.5–5.2)
ANION GAP SERPL CALC-SCNC: 10 MMOL/L (ref 8–16)
BUN SERPL-MCNC: 20 MG/DL (ref 8–23)
CALCIUM SERPL-MCNC: 9.4 MG/DL (ref 8.7–10.5)
CHLORIDE SERPL-SCNC: 101 MMOL/L (ref 95–110)
CO2 SERPL-SCNC: 26 MMOL/L (ref 23–29)
CREAT SERPL-MCNC: 1 MG/DL (ref 0.5–1.4)
EST. GFR  (AFRICAN AMERICAN): >60 ML/MIN/1.73 M^2
EST. GFR  (NON AFRICAN AMERICAN): 58 ML/MIN/1.73 M^2
ESTIMATED AVG GLUCOSE: 169 MG/DL (ref 68–131)
GLUCOSE SERPL-MCNC: 195 MG/DL (ref 70–110)
HBA1C MFR BLD HPLC: 7.5 % (ref 4–5.6)
PHOSPHATE SERPL-MCNC: 3.3 MG/DL (ref 2.7–4.5)
POTASSIUM SERPL-SCNC: 3.6 MMOL/L (ref 3.5–5.1)
SODIUM SERPL-SCNC: 137 MMOL/L (ref 136–145)
URATE SERPL-MCNC: 5.6 MG/DL (ref 2.4–5.7)

## 2020-11-16 PROCEDURE — 82985 ASSAY OF GLYCATED PROTEIN: CPT

## 2020-11-16 PROCEDURE — 84550 ASSAY OF BLOOD/URIC ACID: CPT

## 2020-11-16 PROCEDURE — 84378 SUGARS SINGLE QUANT: CPT

## 2020-11-16 PROCEDURE — 82088 ASSAY OF ALDOSTERONE: CPT

## 2020-11-16 PROCEDURE — 36415 COLL VENOUS BLD VENIPUNCTURE: CPT | Mod: PO

## 2020-11-16 PROCEDURE — 80069 RENAL FUNCTION PANEL: CPT

## 2020-11-16 PROCEDURE — 83036 HEMOGLOBIN GLYCOSYLATED A1C: CPT

## 2020-11-19 LAB
ALDOST SERPL-MCNC: 5.5 NG/DL
RENIN PLAS-CCNC: 3 NG/ML/H

## 2020-11-20 ENCOUNTER — OFFICE VISIT (OUTPATIENT)
Dept: PULMONOLOGY | Facility: CLINIC | Age: 70
End: 2020-11-20
Payer: MEDICARE

## 2020-11-20 DIAGNOSIS — R91.1 PULMONARY NODULE: ICD-10-CM

## 2020-11-20 DIAGNOSIS — N28.89 RIGHT KIDNEY MASS: ICD-10-CM

## 2020-11-20 DIAGNOSIS — I10 MALIGNANT HYPERTENSION: ICD-10-CM

## 2020-11-20 DIAGNOSIS — G47.33 OSA (OBSTRUCTIVE SLEEP APNEA): ICD-10-CM

## 2020-11-20 DIAGNOSIS — E66.01 MORBID OBESITY WITH BODY MASS INDEX (BMI) GREATER THAN OR EQUAL TO 50: ICD-10-CM

## 2020-11-20 DIAGNOSIS — J01.00 ACUTE MAXILLARY SINUSITIS, RECURRENCE NOT SPECIFIED: ICD-10-CM

## 2020-11-20 LAB
FRUCTOSAMINE SERPL-SCNC: 235 UMOL /L
GLYCOMARK (TM): 6.4 UG/ML

## 2020-11-20 PROCEDURE — 99204 PR OFFICE/OUTPT VISIT, NEW, LEVL IV, 45-59 MIN: ICD-10-PCS | Mod: S$GLB,,, | Performed by: INTERNAL MEDICINE

## 2020-11-20 PROCEDURE — 99204 OFFICE O/P NEW MOD 45 MIN: CPT | Mod: S$GLB,,, | Performed by: INTERNAL MEDICINE

## 2020-11-20 RX ORDER — AMOXICILLIN AND CLAVULANATE POTASSIUM 875; 125 MG/1; MG/1
1 TABLET, FILM COATED ORAL 2 TIMES DAILY
Qty: 28 TABLET | Refills: 0 | Status: SHIPPED | OUTPATIENT
Start: 2020-11-20 | End: 2021-04-12

## 2020-11-20 NOTE — PROGRESS NOTES
New Office Visit/Consultation Note *    Patient Name: Juhi Taylor  MRN: [id number]  : 1950      Reason for visit: Lung nodule, sinsusitis    HPI:     2020 - 71 yo female with h/o breast cancer (2016, s/p bilateral mastectomy and chemo with EFFIE) had CT abdomen and subsequent PET scan (see below) with renal mass and lung nodule (minimal SUV activity).  Oncology recommended a CT guided biopsy of her lung lesion and she came to see me for opinion.  We reviewed CT scan and PET images which show minimal activity bin the area.  We discussed the possibility of needle biopsy and what could be expected.  We discussed possible biopsy results including nondiagnostic, scar tissues, or possibility of cancer.  She is not wanting to proceed with a biopsy at this time and would like to wait 3 months (early February) and repeat CT scan and make further decisions at that time.  We also discussed the finding on her kidney and I have recommended that she discuss further with Dr Sierra and I have encouraged that she proceed with any recommended diagnostics regarding this.  All questions were answered.  She does have some chronic sinus issues and we will try to treat this and get her less symptomatic.    Past Medical History    Past Medical History:   Diagnosis Date    Cancer     breast    Chronic sinusitis     Colitis     Coronary artery disease 2005    VERY MILD    Diabetes mellitus     Gout     Hyperlipemia     Hypertension     Hypertrophy of nasal turbinates     Multiple thyroid nodules     NICOLE (obstructive sleep apnea)     waiting on CPAP    Thyroid disease        Past Surgical History    Past Surgical History:   Procedure Laterality Date    BREAST SURGERY      Mastectomy with sentinal LN bx    CARDIAC CATHETERIZATION      CHOLECYSTECTOMY      COLONOSCOPY      ESOPHAGOGASTRODUODENOSCOPY      HAND SURGERY Right     HYSTERECTOMY      MASTECTOMY Bilateral     TUBAL LIGATION          Medications      Current Outpatient Medications:     allopurinol (ZYLOPRIM) 300 MG tablet, Take 300 mg by mouth once daily., Disp: , Rfl:     carvedilol (COREG) 25 MG tablet, Take 25 mg by mouth 2 (two) times daily with meals., Disp: , Rfl:     ergocalciferol (VITAMIN D2) 50,000 unit Cap, TAKE 1 CAPSULE (50,000 UNITS) BY MOUTH THREE TIMES A WEEK, Disp: 36 capsule, Rfl: 1    folic acid (FOLVITE) 1 MG tablet, Take 1 mg by mouth once daily., Disp: , Rfl:     furosemide (LASIX) 20 MG tablet, Take 20 mg by mouth daily as needed (edema.). , Disp: , Rfl:     HYDROcodone-acetaminophen (NORCO) 5-325 mg per tablet, Take 1 tablet by mouth every 4 (four) hours as needed for Pain., Disp: 18 tablet, Rfl: 0    levocetirizine (XYZAL) 5 MG tablet, , Disp: , Rfl:     levothyroxine (SYNTHROID) 50 MCG tablet, , Disp: , Rfl:     lisinopril (PRINIVIL,ZESTRIL) 40 MG tablet, Take 1 tablet (40 mg total) by mouth once daily., Disp: 90 tablet, Rfl: 3    metFORMIN (GLUCOPHAGE-XR) 500 MG ER 24hr tablet, Take 2 tablets (1,000 mg total) by mouth 2 (two) times daily with meals., Disp: 360 tablet, Rfl: 3    omeprazole (PRILOSEC) 40 MG capsule, Take 1 capsule (40 mg total) by mouth 2 (two) times daily before meals. Take on empty stomach 30-60 minutes before meal, Disp: 60 capsule, Rfl: 11    potassium chloride (KLOR-CON) 10 MEQ TbSR, , Disp: , Rfl:     SITagliptin (JANUVIA) 100 MG Tab, Take 1 tablet (100 mg total) by mouth once daily., Disp: 90 tablet, Rfl: 3    amLODIPine (NORVASC) 10 MG tablet, Take 1 tablet (10 mg total) by mouth once daily., Disp: 30 tablet, Rfl: 0    amoxicillin-clavulanate 875-125mg (AUGMENTIN) 875-125 mg per tablet, Take 1 tablet by mouth 2 (two) times daily., Disp: 28 tablet, Rfl: 0    budesonide 1 mg/2 mL NbSp, Inhale 1 ampule into the lungs 2 (two) times a day. Add to Neti pot or sinus rinse kit daily, Disp: 120 mL, Rfl: 12    fluconazole (DIFLUCAN) 150 MG Tab, , Disp: , Rfl:     fluticasone  propionate (FLONASE) 50 mcg/actuation nasal spray, 1 spray (50 mcg total) by Each Nostril route 2 (two) times daily., Disp: 16 g, Rfl: 12    gabapentin (NEURONTIN) 300 MG capsule, Take 300 mg by mouth every evening., Disp: , Rfl:     nystatin-triamcinolone (MYCOLOG II) cream, , Disp: , Rfl:     Allergies    Review of patient's allergies indicates:   Allergen Reactions    Amitiza [lubiprostone] Other (See Comments)     Does not remember    Irbesartan Swelling    Jardiance [empagliflozin]      Leg cramps    Linzess [linaclotide] Other (See Comments)     Does not remember       SocHx    Social History     Tobacco Use   Smoking Status Former Smoker    Quit date: 12/28/2016    Years since quitting: 3.8   Smokeless Tobacco Never Used   Tobacco Comment    quit    40 pack year    Social History     Substance and Sexual Activity   Alcohol Use No       Drug Use - no  Occupation - not working  Asbestos exposure - no  Pets - na    FMHx    Family History   Problem Relation Age of Onset    Cancer Mother     Stroke Father     Hepatitis Brother     Asthma Daughter     Cancer Maternal Aunt     Glaucoma Sister     Macular degeneration Neg Hx     Retinal detachment Neg Hx          Review of Systems  Review of Systems   Constitutional: Positive for malaise/fatigue. Negative for chills, diaphoresis, fever and weight loss.   HENT: Positive for congestion and sinus pain.         Postnasal drip   Eyes: Negative for pain.   Respiratory: Negative for cough, hemoptysis, sputum production, shortness of breath, wheezing and stridor.    Cardiovascular: Negative for chest pain, palpitations, orthopnea, claudication, leg swelling and PND.   Gastrointestinal: Negative for abdominal pain, blood in stool, constipation, diarrhea, heartburn, melena, nausea and vomiting.   Genitourinary: Negative for dysuria, frequency, hematuria and urgency.        Has had some pain which she relates to kidney stones   Musculoskeletal: Negative  for falls and myalgias.   Neurological: Negative for dizziness, tingling, tremors, sensory change, speech change, focal weakness, seizures, loss of consciousness, weakness and headaches.   Psychiatric/Behavioral: Negative for depression, substance abuse and suicidal ideas. The patient is not nervous/anxious.        Physical Exam    Vitals:    11/20/20 0917   BP: (!) (P) 162/98   BP Location: (P) Left arm   Patient Position: (P) Sitting   BP Method: (P) Large (Manual)   Pulse: (P) 79   Temp: (P) 97.4 °F (36.3 °C)   TempSrc: (P) Temporal   SpO2: (P) 96%   Weight: (!) (P) 137.9 kg (304 lb)       Physical Exam  Vitals signs and nursing note reviewed.   Constitutional:       General: She is not in acute distress.     Appearance: She is well-developed. She is obese. She is not ill-appearing, toxic-appearing or diaphoretic.   HENT:      Head: Normocephalic and atraumatic.      Right Ear: External ear normal.      Left Ear: External ear normal.      Nose: Nose normal.   Eyes:      General: No scleral icterus.        Right eye: No discharge.         Left eye: No discharge.      Conjunctiva/sclera: Conjunctivae normal.      Pupils: Pupils are equal, round, and reactive to light.   Neck:      Musculoskeletal: Normal range of motion and neck supple. No neck rigidity or muscular tenderness.      Thyroid: No thyromegaly.      Vascular: No carotid bruit or JVD.      Trachea: No tracheal deviation.   Cardiovascular:      Rate and Rhythm: Normal rate and regular rhythm.      Heart sounds: Normal heart sounds. No murmur. No friction rub. No gallop.    Pulmonary:      Effort: Pulmonary effort is normal. No respiratory distress.      Breath sounds: Normal breath sounds. No stridor. No wheezing, rhonchi or rales.   Chest:      Chest wall: No tenderness.   Abdominal:      General: Bowel sounds are normal. There is no distension.      Palpations: Abdomen is soft.      Tenderness: There is no abdominal tenderness. There is no guarding.    Genitourinary:     Comments: S/p bilateral mastectomy  Musculoskeletal: Normal range of motion.         General: No tenderness.      Right lower leg: No edema.      Left lower leg: No edema.   Lymphadenopathy:      Cervical: No cervical adenopathy.   Skin:     General: Skin is warm and dry.   Neurological:      General: No focal deficit present.      Mental Status: She is alert and oriented to person, place, and time. Mental status is at baseline.      Cranial Nerves: No cranial nerve deficit.   Psychiatric:         Mood and Affect: Mood normal.         Behavior: Behavior normal.         Thought Content: Thought content normal.         Judgment: Judgment normal.         Labs    Lab Results   Component Value Date    WBC 8.01 10/18/2020    HGB 12.1 10/18/2020    HCT 40.2 10/18/2020     10/18/2020       Sodium   Date Value Ref Range Status   11/16/2020 137 136 - 145 mmol/L Final     Potassium   Date Value Ref Range Status   11/16/2020 3.6 3.5 - 5.1 mmol/L Final     Chloride   Date Value Ref Range Status   11/16/2020 101 95 - 110 mmol/L Final     CO2   Date Value Ref Range Status   11/16/2020 26 23 - 29 mmol/L Final     Glucose   Date Value Ref Range Status   11/16/2020 195 (H) 70 - 110 mg/dL Final     BUN   Date Value Ref Range Status   11/16/2020 20 8 - 23 mg/dL Final     Creatinine   Date Value Ref Range Status   11/16/2020 1.0 0.5 - 1.4 mg/dL Final     Calcium   Date Value Ref Range Status   11/16/2020 9.4 8.7 - 10.5 mg/dL Final     Total Protein   Date Value Ref Range Status   10/18/2020 6.6 6.0 - 8.4 g/dL Final     Albumin   Date Value Ref Range Status   11/16/2020 3.0 (L) 3.5 - 5.2 g/dL Final     Total Bilirubin   Date Value Ref Range Status   10/18/2020 0.3 0.1 - 1.0 mg/dL Final     Comment:     For infants and newborns, interpretation of results should be based  on gestational age, weight and in agreement with clinical  observations.  Premature Infant recommended reference ranges:  Up to 24  hours.............<8.0 mg/dL  Up to 48 hours............<12.0 mg/dL  3-5 days..................<15.0 mg/dL  6-29 days.................<15.0 mg/dL       Alkaline Phosphatase   Date Value Ref Range Status   10/18/2020 107 55 - 135 U/L Final     AST   Date Value Ref Range Status   10/18/2020 15 10 - 40 U/L Final     ALT   Date Value Ref Range Status   10/18/2020 16 10 - 44 U/L Final     Anion Gap   Date Value Ref Range Status   11/16/2020 10 8 - 16 mmol/L Final       Xrays    NM PET CT ROUTINE     CLINICAL HISTORY:  renal mass, lung nodule; Other specified disorders of kidney and ureter , personal history of left breast cancer in 2014 status post bilateral mastectomies.  Patient is reportedly received chemotherapy and XRT for breast cancer.  Recent abnormal CT of abdomen and pelvis with right renal mass in left lung nodule.     TECHNIQUE:  Following IV administration of 12.3 mCi of F-18 labeled FDG into right antecubital fossa and a 60 minute delay, PET CT was performed from the vertex of the skull through the proximal thighs with an integrated PET CT scanner with image fusion. CT images were obtained to aid in attenuation correction and PET localization.     The patient's serum glucose at the time of the exam was 144 mg/dL.     COMPARISON:  CT 10/18/2020     FINDINGS:  At site of previously identified superior right renal mass, FDG uptake is similar to that of normal renal cortex.     Pleural-based 20 mm nodule anteriorly in basilar left lower lobe (series 3, image 132) shows little FDG uptake with max SUV of 1.2, similar to that of background lung parenchymal activity.     No other abnormal FDG activity.     Intracranial compartment is unremarkable.  Near complete opacification of left maxillary sinus occurs with very mild polypoid mucosal thickening in inferior right maxillary sinus.  No enlarged cervical or supraclavicular lymph nodes.     No new pulmonary nodules or masses.  Trace left pleural fluid is present.   No enlarged mediastinal lymph nodes.  Heart is enlarged with coronary artery calcification.  Tiny hiatal hernia incidentally noted.  No enlarged axillary lymph nodes.     Surgical clips in gallbladder fossa indicate cholecystectomy.  Due to patient habitus, excessive image noise limits evaluation of the abdomen and pelvis.  Bilateral kidney abnormalities are seen to better advantage on the previous CT abdomen and pelvis with IV contrast.  No enlarged lymph nodes identified throughout the abdomen and pelvis.  Mild diffuse aortoiliac calcifications are present.  Uterus has been removed.     Diffuse degenerative changes affect the spine.  No suspicious osseous abnormality.     Impression:     1. No increased FDG activity at site of previously identified superior right renal mass.  If definitive imaging characterization is desired, CT abdomen without and with IV contrast per renal protocol can be considered, although the finding remains of concern for primary renal malignancy, possibly low-grade such as papillary carcinoma.  2. Unchanged 20 mm pleural-based nodularity in left lower lobe without increased FDG activity.  Benign etiologies such as atelectasis or infectious or inflammatory process can give this appearance.  Low-grade malignancies such as low-grade metastasis or primary lung malignancy could also give a similar appearance.  Continued CT thorax follow-up without IV contrast is recommended in 3 months.  3. Coronary artery calcifications.        Electronically signed by: Adalberto Horan MD  Date:                                            11/09/2020  Time:                                           17:03    Impression/Plan    Problem List Items Addressed This Visit        ENT    Acute maxillary sinusitis     · Will treat with augmentin, nasal saline and antihistamines and see how she does            Pulmonary    Pulmonary nodule     · As above she does not want to proceed with needle biopsy  · Will repeat CT scan  in early 2/2021 and make further decisions then         Relevant Orders    CT Chest Without Contrast       Cardiac/Vascular    Malignant hypertension     · BP elevated today, she will follow up with her primary            Renal/    Right kidney mass     · Discussed with pt and she will follow up with Dr Sierra            Endocrine    Morbid obesity with body mass index (BMI) greater than or equal to 50     · Needs to work on weight loss            Other    NICOLE (obstructive sleep apnea)     · Aware                I have spent about 45 minutes with the patient taking the history and examining the patient.  We have discussed the diagnoses and current plan and all questions have been answered.  We have discussed the follow up plan.  The patient and family (if present) know to contact the office with any questions they may have.        Kendall Brady MD

## 2020-11-20 NOTE — ASSESSMENT & PLAN NOTE
· As above she does not want to proceed with needle biopsy  · Will repeat CT scan in early 2/2021 and make further decisions then

## 2020-11-30 PROBLEM — I50.9 CHF (CONGESTIVE HEART FAILURE): Status: RESOLVED | Noted: 2020-11-30 | Resolved: 2020-11-30

## 2020-11-30 PROBLEM — I50.9 CHF (CONGESTIVE HEART FAILURE): Status: ACTIVE | Noted: 2020-11-30

## 2020-11-30 PROBLEM — I50.9 ACUTE ON CHRONIC CONGESTIVE HEART FAILURE: Status: ACTIVE | Noted: 2020-11-30

## 2020-12-07 ENCOUNTER — TELEPHONE (OUTPATIENT)
Dept: CARDIOLOGY | Facility: CLINIC | Age: 70
End: 2020-12-07

## 2020-12-07 DIAGNOSIS — Z98.62 S/P ANGIOPLASTY: Primary | ICD-10-CM

## 2020-12-07 NOTE — TELEPHONE ENCOUNTER
----- Message from Suyapa Abarca RN sent at 12/7/2020  1:43 PM CST -----  Regarding: phase ii cardiac rehab orders  Mrs Juhi Taylor expressed an interest in attending phase II cardiac rehab following her recent hospitalization where she received cardiac stents. Can you please send an order for phase II cardiac rehab so that we can verify her insurance and schedule an orientation? Thank you, Suyapa Abarca RN

## 2020-12-08 ENCOUNTER — TELEPHONE (OUTPATIENT)
Dept: CARDIOLOGY | Facility: CLINIC | Age: 70
End: 2020-12-08

## 2020-12-08 NOTE — TELEPHONE ENCOUNTER
----- Message from Louise Foreman sent at 12/8/2020  8:33 AM CST -----  Contact: pt  Type: Needs Medical Advice    Who Called:  Pt  Best Call Back Number: [phone]    Additional Information: Requesting a call back regarding follow up from procedure   Please Advise ---Thank you

## 2020-12-16 ENCOUNTER — OFFICE VISIT (OUTPATIENT)
Dept: CARDIOLOGY | Facility: CLINIC | Age: 70
End: 2020-12-16
Payer: MEDICARE

## 2020-12-16 VITALS
SYSTOLIC BLOOD PRESSURE: 183 MMHG | WEIGHT: 293 LBS | HEIGHT: 67 IN | HEART RATE: 66 BPM | DIASTOLIC BLOOD PRESSURE: 93 MMHG | BODY MASS INDEX: 45.99 KG/M2

## 2020-12-16 DIAGNOSIS — R91.1 PULMONARY NODULE: ICD-10-CM

## 2020-12-16 DIAGNOSIS — I10 ESSENTIAL HYPERTENSION: ICD-10-CM

## 2020-12-16 DIAGNOSIS — E66.01 MORBID OBESITY WITH BODY MASS INDEX (BMI) GREATER THAN OR EQUAL TO 50: ICD-10-CM

## 2020-12-16 DIAGNOSIS — E11.9 TYPE 2 DIABETES MELLITUS WITHOUT COMPLICATION, WITHOUT LONG-TERM CURRENT USE OF INSULIN: ICD-10-CM

## 2020-12-16 DIAGNOSIS — G47.33 OSA (OBSTRUCTIVE SLEEP APNEA): ICD-10-CM

## 2020-12-16 DIAGNOSIS — I51.89 DIASTOLIC DYSFUNCTION: ICD-10-CM

## 2020-12-16 DIAGNOSIS — N28.89 RIGHT KIDNEY MASS: ICD-10-CM

## 2020-12-16 DIAGNOSIS — Z95.5 S/P CORONARY ARTERY STENT PLACEMENT: ICD-10-CM

## 2020-12-16 DIAGNOSIS — I50.33 ACUTE ON CHRONIC DIASTOLIC CONGESTIVE HEART FAILURE: Primary | ICD-10-CM

## 2020-12-16 DIAGNOSIS — E03.9 ACQUIRED HYPOTHYROIDISM: ICD-10-CM

## 2020-12-16 DIAGNOSIS — Z85.3 HISTORY OF BREAST CANCER IN FEMALE: ICD-10-CM

## 2020-12-16 DIAGNOSIS — I25.10 CORONARY ARTERY DISEASE INVOLVING NATIVE CORONARY ARTERY OF NATIVE HEART WITHOUT ANGINA PECTORIS: ICD-10-CM

## 2020-12-16 DIAGNOSIS — E78.5 HYPERLIPIDEMIA, UNSPECIFIED HYPERLIPIDEMIA TYPE: ICD-10-CM

## 2020-12-16 PROCEDURE — 99214 PR OFFICE/OUTPT VISIT, EST, LEVL IV, 30-39 MIN: ICD-10-PCS | Mod: S$PBB,,, | Performed by: PHYSICIAN ASSISTANT

## 2020-12-16 PROCEDURE — 99214 OFFICE O/P EST MOD 30 MIN: CPT | Mod: S$PBB,,, | Performed by: PHYSICIAN ASSISTANT

## 2020-12-16 PROCEDURE — 99999 PR PBB SHADOW E&M-EST. PATIENT-LVL IV: ICD-10-PCS | Mod: PBBFAC,,, | Performed by: PHYSICIAN ASSISTANT

## 2020-12-16 PROCEDURE — 99999 PR PBB SHADOW E&M-EST. PATIENT-LVL IV: CPT | Mod: PBBFAC,,, | Performed by: PHYSICIAN ASSISTANT

## 2020-12-16 PROCEDURE — 99214 OFFICE O/P EST MOD 30 MIN: CPT | Mod: PBBFAC,PO | Performed by: PHYSICIAN ASSISTANT

## 2020-12-16 RX ORDER — HYDRALAZINE HYDROCHLORIDE 25 MG/1
25 TABLET, FILM COATED ORAL 3 TIMES DAILY PRN
Qty: 90 TABLET | Refills: 11 | Status: SHIPPED | OUTPATIENT
Start: 2020-12-16 | End: 2021-04-23 | Stop reason: DRUGHIGH

## 2020-12-16 NOTE — PROGRESS NOTES
Subjective:    Patient ID:  Juhi Taylor is a 70 y.o. female who presents for follow-up of CAD.      HPI  Ms. Taylor is a very pleasant lady who follows with Dr. Johnson. She was hospitalized 11/30-12/5 with decompensated heart failure and uncontrolled HTN. She had an abnormal nuclear stress test that admission, and she underwent coronary angiography with successful PCI to the mid LCx, ostial LCx, and prox-mid LAD.     She has a long-standing hx of difficult to control HTN. She has tried multiple medications with adverse side-effects. She does not want to take hydralazine because she is concerned it may affect her kidneys. She knows the amlodipine causes her legs to swell, but it works well for her blood pressure, so she takes it. She does not want to take aldactone because it's caused severe cramping in the past. Increased doses of lasix cause the same cramping, despite taking KCl supplements. SBP was in the 150s this morning after taking her AM meds.     She states her weights have been stable and she is trying to closely monitor her sodium intake. She plans to follow up with Nephrology soon. She also has a right kidney mass and a stable lung mass that need follow up.     Review of Systems   Constitution: Negative for chills, diaphoresis, fever, weight gain and weight loss.   HENT: Negative for sore throat.    Eyes: Negative for blurred vision, vision loss in left eye, vision loss in right eye and visual disturbance.   Cardiovascular: Negative for chest pain, claudication, dyspnea on exertion, leg swelling, near-syncope, orthopnea, palpitations, paroxysmal nocturnal dyspnea and syncope.   Respiratory: Negative for cough, hemoptysis, shortness of breath, sputum production and wheezing.    Endocrine: Negative for cold intolerance and heat intolerance.   Hematologic/Lymphatic: Negative for adenopathy. Does not bruise/bleed easily.   Skin: Negative for rash.   Musculoskeletal: Negative for falls, muscle  "weakness and myalgias.   Gastrointestinal: Negative for abdominal pain, change in bowel habit, constipation, diarrhea, melena and nausea.   Genitourinary: Negative for bladder incontinence.   Neurological: Negative for dizziness, focal weakness, headaches, light-headedness, numbness and weakness.   Psychiatric/Behavioral: Negative for altered mental status.         Vitals:    12/16/20 1540   BP: (!) 183/93   BP Location: Right arm   Patient Position: Sitting   Pulse: 66   Weight: (!) 136.2 kg (300 lb 4.3 oz)   Height: 5' 7" (1.702 m)       Objective:    Physical Exam   Constitutional: She is oriented to person, place, and time. She appears well-developed and well-nourished. No distress.   HENT:   Head: Normocephalic and atraumatic.   Mouth/Throat: Oropharynx is clear and moist.   Eyes: Pupils are equal, round, and reactive to light. Conjunctivae and EOM are normal. No scleral icterus.   Neck: Neck supple. No JVD present. No tracheal deviation present.   Cardiovascular: Normal rate and regular rhythm. Exam reveals no gallop and no friction rub.   No murmur heard.  Pulmonary/Chest: Effort normal and breath sounds normal. No respiratory distress. She has no wheezes. She has no rales. She exhibits no tenderness.   Abdominal: Soft. Bowel sounds are normal. She exhibits no distension. There is no hepatosplenomegaly. There is no abdominal tenderness.   Musculoskeletal:         General: No tenderness or edema.   Neurological: She is alert and oriented to person, place, and time.   Skin: Skin is warm and dry. No rash noted. No erythema.   Psychiatric: She has a normal mood and affect. Her behavior is normal.         Assessment:       Problem List Items Addressed This Visit        Cardiology Problems    Acute on chronic congestive heart failure - Primary    Coronary artery disease    Essential hypertension    Hyperlipemia       Other    Diabetes mellitus    Diastolic dysfunction    History of breast cancer in female    " Hypothyroidism    Morbid obesity with body mass index (BMI) greater than or equal to 50    NICOLE (obstructive sleep apnea)    Pulmonary nodule    Right kidney mass    S/P coronary artery stent placement           Plan:       Continue current medications.   She agrees to take hydralazine prn. Rx called in to her pharmacy.   Discussed diet and exercise.   She knows she can double her lasix and her potassium as needed for weight gain > 3 lbs overnight or > 5 lbs in 1 week.   F/U with Dr. Johnson in 4-5 months.

## 2021-01-25 PROBLEM — N63.20 LEFT BREAST MASS: Status: ACTIVE | Noted: 2021-01-25

## 2021-02-03 ENCOUNTER — HOSPITAL ENCOUNTER (OUTPATIENT)
Dept: RADIOLOGY | Facility: HOSPITAL | Age: 71
Discharge: HOME | End: 2021-02-03
Attending: INTERNAL MEDICINE
Payer: MEDICARE

## 2021-02-03 DIAGNOSIS — R91.1 PULMONARY NODULE: ICD-10-CM

## 2021-02-03 PROCEDURE — 71250 CT THORAX DX C-: CPT | Mod: TC,PO

## 2021-02-09 DIAGNOSIS — I25.10 CORONARY ARTERY DISEASE INVOLVING NATIVE CORONARY ARTERY OF NATIVE HEART WITHOUT ANGINA PECTORIS: Primary | ICD-10-CM

## 2021-02-09 RX ORDER — CLOPIDOGREL BISULFATE 75 MG/1
75 TABLET ORAL DAILY
Qty: 30 TABLET | Refills: 2 | Status: SHIPPED | OUTPATIENT
Start: 2021-02-09 | End: 2021-05-01 | Stop reason: SDUPTHER

## 2021-02-22 ENCOUNTER — PATIENT MESSAGE (OUTPATIENT)
Dept: FAMILY MEDICINE | Facility: CLINIC | Age: 71
End: 2021-02-22

## 2021-02-22 ENCOUNTER — PATIENT MESSAGE (OUTPATIENT)
Dept: ENDOCRINOLOGY | Facility: CLINIC | Age: 71
End: 2021-02-22

## 2021-02-22 DIAGNOSIS — E78.5 HYPERLIPIDEMIA, UNSPECIFIED HYPERLIPIDEMIA TYPE: ICD-10-CM

## 2021-02-22 DIAGNOSIS — I25.10 CORONARY ARTERY DISEASE INVOLVING NATIVE CORONARY ARTERY OF NATIVE HEART WITHOUT ANGINA PECTORIS: ICD-10-CM

## 2021-02-22 DIAGNOSIS — E11.65 TYPE 2 DIABETES MELLITUS WITH HYPERGLYCEMIA, WITHOUT LONG-TERM CURRENT USE OF INSULIN: ICD-10-CM

## 2021-02-22 DIAGNOSIS — Z78.9 STATIN INTOLERANCE: ICD-10-CM

## 2021-02-22 DIAGNOSIS — E78.00 HYPERCHOLESTEROLEMIA: Primary | ICD-10-CM

## 2021-02-22 PROBLEM — J01.00 ACUTE MAXILLARY SINUSITIS: Status: RESOLVED | Noted: 2020-11-20 | Resolved: 2021-02-22

## 2021-02-22 RX ORDER — ALIROCUMAB 75 MG/ML
75 INJECTION, SOLUTION SUBCUTANEOUS
Qty: 6 ML | Refills: 3 | Status: SHIPPED | OUTPATIENT
Start: 2021-02-22 | End: 2021-05-23

## 2021-02-23 DIAGNOSIS — E11.65 TYPE 2 DIABETES MELLITUS WITH HYPERGLYCEMIA, WITHOUT LONG-TERM CURRENT USE OF INSULIN: ICD-10-CM

## 2021-02-23 DIAGNOSIS — E78.00 HYPERCHOLESTEROLEMIA: ICD-10-CM

## 2021-02-23 DIAGNOSIS — I25.10 CORONARY ARTERY DISEASE INVOLVING NATIVE CORONARY ARTERY OF NATIVE HEART WITHOUT ANGINA PECTORIS: ICD-10-CM

## 2021-02-23 DIAGNOSIS — Z78.9 STATIN INTOLERANCE: ICD-10-CM

## 2021-02-23 DIAGNOSIS — E78.5 HYPERLIPIDEMIA, UNSPECIFIED HYPERLIPIDEMIA TYPE: ICD-10-CM

## 2021-03-27 ENCOUNTER — OFFICE VISIT (OUTPATIENT)
Dept: ENDOCRINOLOGY | Facility: CLINIC | Age: 71
End: 2021-03-27
Payer: MEDICARE

## 2021-03-27 DIAGNOSIS — R79.89 ABNORMAL THYROID BLOOD TEST: ICD-10-CM

## 2021-03-27 DIAGNOSIS — G47.33 OSA (OBSTRUCTIVE SLEEP APNEA): ICD-10-CM

## 2021-03-27 DIAGNOSIS — E03.9 ACQUIRED HYPOTHYROIDISM: ICD-10-CM

## 2021-03-27 DIAGNOSIS — M10.00 IDIOPATHIC GOUT, UNSPECIFIED CHRONICITY, UNSPECIFIED SITE: ICD-10-CM

## 2021-03-27 DIAGNOSIS — R80.8 OTHER PROTEINURIA: ICD-10-CM

## 2021-03-27 DIAGNOSIS — D53.9 NUTRITIONAL ANEMIA: ICD-10-CM

## 2021-03-27 DIAGNOSIS — M85.80 OSTEOPENIA, UNSPECIFIED LOCATION: ICD-10-CM

## 2021-03-27 DIAGNOSIS — E78.5 HYPERLIPIDEMIA, UNSPECIFIED HYPERLIPIDEMIA TYPE: ICD-10-CM

## 2021-03-27 DIAGNOSIS — I10 ESSENTIAL HYPERTENSION: ICD-10-CM

## 2021-03-27 DIAGNOSIS — E04.1 NODULAR THYROID DISEASE: Primary | ICD-10-CM

## 2021-03-27 DIAGNOSIS — E79.0 HYPERURICEMIA: ICD-10-CM

## 2021-03-27 DIAGNOSIS — Z78.9 STATIN INTOLERANCE: ICD-10-CM

## 2021-03-27 DIAGNOSIS — E21.3 HYPERPARATHYROIDISM: ICD-10-CM

## 2021-03-27 DIAGNOSIS — Z85.3 HISTORY OF BREAST CANCER IN FEMALE: ICD-10-CM

## 2021-03-27 DIAGNOSIS — Z78.0 POSTMENOPAUSAL: ICD-10-CM

## 2021-03-27 DIAGNOSIS — E55.9 HYPOVITAMINOSIS D: ICD-10-CM

## 2021-03-27 DIAGNOSIS — K21.9 GASTROESOPHAGEAL REFLUX DISEASE, UNSPECIFIED WHETHER ESOPHAGITIS PRESENT: ICD-10-CM

## 2021-03-27 DIAGNOSIS — E04.9 GOITER: ICD-10-CM

## 2021-03-27 DIAGNOSIS — I25.10 CORONARY ARTERY DISEASE INVOLVING NATIVE CORONARY ARTERY OF NATIVE HEART WITHOUT ANGINA PECTORIS: ICD-10-CM

## 2021-03-27 DIAGNOSIS — E66.01 MORBID OBESITY: ICD-10-CM

## 2021-03-27 DIAGNOSIS — E11.65 TYPE 2 DIABETES MELLITUS WITH HYPERGLYCEMIA, WITHOUT LONG-TERM CURRENT USE OF INSULIN: ICD-10-CM

## 2021-03-27 DIAGNOSIS — E06.9 THYROIDITIS: ICD-10-CM

## 2021-03-27 PROCEDURE — 99214 OFFICE O/P EST MOD 30 MIN: CPT | Mod: 95,,, | Performed by: INTERNAL MEDICINE

## 2021-03-27 PROCEDURE — 99214 PR OFFICE/OUTPT VISIT, EST, LEVL IV, 30-39 MIN: ICD-10-PCS | Mod: 95,,, | Performed by: INTERNAL MEDICINE

## 2021-04-06 ENCOUNTER — OFFICE VISIT (OUTPATIENT)
Dept: PULMONOLOGY | Facility: CLINIC | Age: 71
End: 2021-04-06
Payer: MEDICARE

## 2021-04-06 DIAGNOSIS — R91.1 PULMONARY NODULE: ICD-10-CM

## 2021-04-06 DIAGNOSIS — G47.33 OSA (OBSTRUCTIVE SLEEP APNEA): ICD-10-CM

## 2021-04-06 PROCEDURE — 99214 PR OFFICE/OUTPT VISIT, EST, LEVL IV, 30-39 MIN: ICD-10-PCS | Mod: S$GLB,,, | Performed by: INTERNAL MEDICINE

## 2021-04-06 PROCEDURE — 99214 OFFICE O/P EST MOD 30 MIN: CPT | Mod: S$GLB,,, | Performed by: INTERNAL MEDICINE

## 2021-04-12 ENCOUNTER — OFFICE VISIT (OUTPATIENT)
Dept: FAMILY MEDICINE | Facility: CLINIC | Age: 71
End: 2021-04-12
Payer: MEDICARE

## 2021-04-12 VITALS
BODY MASS INDEX: 45.99 KG/M2 | SYSTOLIC BLOOD PRESSURE: 140 MMHG | DIASTOLIC BLOOD PRESSURE: 78 MMHG | WEIGHT: 293 LBS | HEIGHT: 67 IN

## 2021-04-12 DIAGNOSIS — E53.8 B12 DEFICIENCY: ICD-10-CM

## 2021-04-12 DIAGNOSIS — I25.10 CORONARY ARTERY DISEASE INVOLVING NATIVE CORONARY ARTERY OF NATIVE HEART WITHOUT ANGINA PECTORIS: ICD-10-CM

## 2021-04-12 DIAGNOSIS — N18.30 CKD STAGE 3 SECONDARY TO DIABETES: ICD-10-CM

## 2021-04-12 DIAGNOSIS — I27.20 PULMONARY HYPERTENSION: ICD-10-CM

## 2021-04-12 DIAGNOSIS — E11.22 CKD STAGE 3 SECONDARY TO DIABETES: ICD-10-CM

## 2021-04-12 DIAGNOSIS — E78.2 MIXED HYPERLIPIDEMIA: ICD-10-CM

## 2021-04-12 DIAGNOSIS — E11.65 TYPE 2 DIABETES MELLITUS WITH HYPERGLYCEMIA, WITHOUT LONG-TERM CURRENT USE OF INSULIN: ICD-10-CM

## 2021-04-12 DIAGNOSIS — I50.30 HEART FAILURE WITH PRESERVED EJECTION FRACTION, UNSPECIFIED HF CHRONICITY: ICD-10-CM

## 2021-04-12 DIAGNOSIS — I10 ESSENTIAL HYPERTENSION: Primary | ICD-10-CM

## 2021-04-12 DIAGNOSIS — Z85.3 HISTORY OF BREAST CANCER: ICD-10-CM

## 2021-04-12 PROCEDURE — 99999 PR PBB SHADOW E&M-EST. PATIENT-LVL IV: ICD-10-PCS | Mod: PBBFAC,,, | Performed by: INTERNAL MEDICINE

## 2021-04-12 PROCEDURE — 96372 THER/PROPH/DIAG INJ SC/IM: CPT | Mod: PBBFAC,PN

## 2021-04-12 PROCEDURE — 99214 PR OFFICE/OUTPT VISIT, EST, LEVL IV, 30-39 MIN: ICD-10-PCS | Mod: S$PBB,,, | Performed by: INTERNAL MEDICINE

## 2021-04-12 PROCEDURE — 99999 PR PBB SHADOW E&M-EST. PATIENT-LVL IV: CPT | Mod: PBBFAC,,, | Performed by: INTERNAL MEDICINE

## 2021-04-12 PROCEDURE — 99214 OFFICE O/P EST MOD 30 MIN: CPT | Mod: PBBFAC,PN | Performed by: INTERNAL MEDICINE

## 2021-04-12 PROCEDURE — 99214 OFFICE O/P EST MOD 30 MIN: CPT | Mod: S$PBB,,, | Performed by: INTERNAL MEDICINE

## 2021-04-12 RX ORDER — CYANOCOBALAMIN 1000 UG/ML
1000 INJECTION, SOLUTION INTRAMUSCULAR; SUBCUTANEOUS
Status: COMPLETED | OUTPATIENT
Start: 2021-04-12 | End: 2021-04-12

## 2021-04-12 RX ORDER — TORSEMIDE 10 MG/1
20 TABLET ORAL DAILY
Qty: 60 TABLET | Refills: 3 | Status: SHIPPED | OUTPATIENT
Start: 2021-04-12 | End: 2021-11-16

## 2021-04-12 RX ADMIN — CYANOCOBALAMIN 1000 MCG: 1000 INJECTION, SOLUTION INTRAMUSCULAR at 11:04

## 2021-04-14 ENCOUNTER — LAB VISIT (OUTPATIENT)
Dept: LAB | Facility: HOSPITAL | Age: 71
End: 2021-04-14
Attending: INTERNAL MEDICINE
Payer: MEDICARE

## 2021-04-14 DIAGNOSIS — D53.9 NUTRITIONAL ANEMIA: ICD-10-CM

## 2021-04-14 DIAGNOSIS — E03.9 ACQUIRED HYPOTHYROIDISM: ICD-10-CM

## 2021-04-14 DIAGNOSIS — I10 ESSENTIAL HYPERTENSION: ICD-10-CM

## 2021-04-14 DIAGNOSIS — M85.80 OSTEOPENIA, UNSPECIFIED LOCATION: ICD-10-CM

## 2021-04-14 DIAGNOSIS — E11.65 TYPE 2 DIABETES MELLITUS WITH HYPERGLYCEMIA, WITHOUT LONG-TERM CURRENT USE OF INSULIN: ICD-10-CM

## 2021-04-14 DIAGNOSIS — E04.1 NODULAR THYROID DISEASE: ICD-10-CM

## 2021-04-14 DIAGNOSIS — G47.33 OSA (OBSTRUCTIVE SLEEP APNEA): ICD-10-CM

## 2021-04-14 DIAGNOSIS — E55.9 HYPOVITAMINOSIS D: ICD-10-CM

## 2021-04-14 DIAGNOSIS — E78.5 HYPERLIPIDEMIA, UNSPECIFIED HYPERLIPIDEMIA TYPE: ICD-10-CM

## 2021-04-14 LAB
25(OH)D3+25(OH)D2 SERPL-MCNC: 23 NG/ML (ref 30–96)
ALBUMIN SERPL BCP-MCNC: 3.3 G/DL (ref 3.5–5.2)
ALP SERPL-CCNC: 97 U/L (ref 55–135)
ALT SERPL W/O P-5'-P-CCNC: 14 U/L (ref 10–44)
ANION GAP SERPL CALC-SCNC: 8 MMOL/L (ref 8–16)
AST SERPL-CCNC: 16 U/L (ref 10–40)
BASOPHILS # BLD AUTO: 0.03 K/UL (ref 0–0.2)
BASOPHILS NFR BLD: 0.5 % (ref 0–1.9)
BILIRUB SERPL-MCNC: 0.4 MG/DL (ref 0.1–1)
BUN SERPL-MCNC: 21 MG/DL (ref 8–23)
CA-I BLDV-SCNC: 1.36 MMOL/L (ref 1.06–1.42)
CALCIUM SERPL-MCNC: 9.7 MG/DL (ref 8.7–10.5)
CHLORIDE SERPL-SCNC: 105 MMOL/L (ref 95–110)
CHOLEST SERPL-MCNC: 153 MG/DL (ref 120–199)
CHOLEST/HDLC SERPL: 3.4 {RATIO} (ref 2–5)
CO2 SERPL-SCNC: 25 MMOL/L (ref 23–29)
CREAT SERPL-MCNC: 1.1 MG/DL (ref 0.5–1.4)
DIFFERENTIAL METHOD: ABNORMAL
EOSINOPHIL # BLD AUTO: 0.3 K/UL (ref 0–0.5)
EOSINOPHIL NFR BLD: 4.1 % (ref 0–8)
ERYTHROCYTE [DISTWIDTH] IN BLOOD BY AUTOMATED COUNT: 15.2 % (ref 11.5–14.5)
EST. GFR  (AFRICAN AMERICAN): 59.6 ML/MIN/1.73 M^2
EST. GFR  (NON AFRICAN AMERICAN): 51.7 ML/MIN/1.73 M^2
ESTIMATED AVG GLUCOSE: 140 MG/DL (ref 68–131)
FOLATE SERPL-MCNC: 18.4 NG/ML (ref 4–24)
GLUCOSE SERPL-MCNC: 153 MG/DL (ref 70–110)
HBA1C MFR BLD: 6.5 % (ref 4–5.6)
HCT VFR BLD AUTO: 37.7 % (ref 37–48.5)
HDLC SERPL-MCNC: 45 MG/DL (ref 40–75)
HDLC SERPL: 29.4 % (ref 20–50)
HGB BLD-MCNC: 11.1 G/DL (ref 12–16)
IMM GRANULOCYTES # BLD AUTO: 0.02 K/UL (ref 0–0.04)
IMM GRANULOCYTES NFR BLD AUTO: 0.3 % (ref 0–0.5)
LDLC SERPL CALC-MCNC: 82 MG/DL (ref 63–159)
LYMPHOCYTES # BLD AUTO: 1.9 K/UL (ref 1–4.8)
LYMPHOCYTES NFR BLD: 28.7 % (ref 18–48)
MCH RBC QN AUTO: 25.2 PG (ref 27–31)
MCHC RBC AUTO-ENTMCNC: 29.4 G/DL (ref 32–36)
MCV RBC AUTO: 86 FL (ref 82–98)
MONOCYTES # BLD AUTO: 0.4 K/UL (ref 0.3–1)
MONOCYTES NFR BLD: 6.1 % (ref 4–15)
NEUTROPHILS # BLD AUTO: 3.9 K/UL (ref 1.8–7.7)
NEUTROPHILS NFR BLD: 60.3 % (ref 38–73)
NONHDLC SERPL-MCNC: 108 MG/DL
NRBC BLD-RTO: 0 /100 WBC
PLATELET # BLD AUTO: 332 K/UL (ref 150–450)
PMV BLD AUTO: 10.1 FL (ref 9.2–12.9)
POTASSIUM SERPL-SCNC: 4 MMOL/L (ref 3.5–5.1)
PROT SERPL-MCNC: 7 G/DL (ref 6–8.4)
PTH-INTACT SERPL-MCNC: 197 PG/ML (ref 9–77)
RBC # BLD AUTO: 4.4 M/UL (ref 4–5.4)
SODIUM SERPL-SCNC: 138 MMOL/L (ref 136–145)
T3 SERPL-MCNC: 75 NG/DL (ref 60–180)
T4 FREE SERPL-MCNC: 1.01 NG/DL (ref 0.71–1.51)
TRIGL SERPL-MCNC: 130 MG/DL (ref 30–150)
TSH SERPL DL<=0.005 MIU/L-ACNC: 0.6 UIU/ML (ref 0.4–4)
URATE SERPL-MCNC: 6.3 MG/DL (ref 2.4–5.7)
VIT B12 SERPL-MCNC: >2000 PG/ML (ref 210–950)
WBC # BLD AUTO: 6.54 K/UL (ref 3.9–12.7)

## 2021-04-14 PROCEDURE — 83970 ASSAY OF PARATHORMONE: CPT | Performed by: INTERNAL MEDICINE

## 2021-04-14 PROCEDURE — 85025 COMPLETE CBC W/AUTO DIFF WBC: CPT | Performed by: INTERNAL MEDICINE

## 2021-04-14 PROCEDURE — 80053 COMPREHEN METABOLIC PANEL: CPT | Performed by: INTERNAL MEDICINE

## 2021-04-14 PROCEDURE — 84480 ASSAY TRIIODOTHYRONINE (T3): CPT | Performed by: INTERNAL MEDICINE

## 2021-04-14 PROCEDURE — 84443 ASSAY THYROID STIM HORMONE: CPT | Performed by: INTERNAL MEDICINE

## 2021-04-14 PROCEDURE — 36415 COLL VENOUS BLD VENIPUNCTURE: CPT | Mod: PN | Performed by: INTERNAL MEDICINE

## 2021-04-14 PROCEDURE — 84378 SUGARS SINGLE QUANT: CPT | Performed by: INTERNAL MEDICINE

## 2021-04-14 PROCEDURE — 80061 LIPID PANEL: CPT | Performed by: INTERNAL MEDICINE

## 2021-04-14 PROCEDURE — 84550 ASSAY OF BLOOD/URIC ACID: CPT | Performed by: INTERNAL MEDICINE

## 2021-04-14 PROCEDURE — 82985 ASSAY OF GLYCATED PROTEIN: CPT | Performed by: INTERNAL MEDICINE

## 2021-04-14 PROCEDURE — 82747 ASSAY OF FOLIC ACID RBC: CPT | Performed by: INTERNAL MEDICINE

## 2021-04-14 PROCEDURE — 86316 IMMUNOASSAY TUMOR OTHER: CPT | Performed by: INTERNAL MEDICINE

## 2021-04-14 PROCEDURE — 82607 VITAMIN B-12: CPT | Performed by: INTERNAL MEDICINE

## 2021-04-14 PROCEDURE — 82306 VITAMIN D 25 HYDROXY: CPT | Performed by: INTERNAL MEDICINE

## 2021-04-14 PROCEDURE — 82088 ASSAY OF ALDOSTERONE: CPT | Performed by: INTERNAL MEDICINE

## 2021-04-14 PROCEDURE — 82330 ASSAY OF CALCIUM: CPT | Performed by: INTERNAL MEDICINE

## 2021-04-14 PROCEDURE — 83036 HEMOGLOBIN GLYCOSYLATED A1C: CPT | Mod: 91 | Performed by: INTERNAL MEDICINE

## 2021-04-14 PROCEDURE — 84439 ASSAY OF FREE THYROXINE: CPT | Performed by: INTERNAL MEDICINE

## 2021-04-14 PROCEDURE — 82746 ASSAY OF FOLIC ACID SERUM: CPT | Performed by: INTERNAL MEDICINE

## 2021-04-15 ENCOUNTER — PATIENT MESSAGE (OUTPATIENT)
Dept: ENDOCRINOLOGY | Facility: CLINIC | Age: 71
End: 2021-04-15

## 2021-04-15 DIAGNOSIS — N28.89 RENAL MASS: Primary | ICD-10-CM

## 2021-04-15 DIAGNOSIS — R80.8 OTHER PROTEINURIA: ICD-10-CM

## 2021-04-16 LAB
CALCIT SERPL-MCNC: <5 PG/ML
IODINE SERPL-MCNC: 66 NG/ML (ref 40–92)
THRYOGLOBULIN INTERPRETATION: ABNORMAL
THYROGLOB AB SERPL-ACNC: <1.8 IU/ML
THYROGLOB SERPL-MCNC: 56 NG/ML
VIT B12 SERPL-MCNC: >1400 NG/L (ref 180–914)

## 2021-04-19 LAB
ALDOST SERPL-MCNC: 14 NG/DL
CGA SERPL-MCNC: 135 NG/ML (ref 0–103)
FOLATE RBC-MCNC: 555 NG/ML (ref 280–791)
FRUCTOSAMINE SERPL-SCNC: 259 UMOL /L
GLYCOMARK (TM): 12.5 UG/ML
RENIN PLAS-CCNC: 1.6 NG/ML/H

## 2021-04-23 ENCOUNTER — OFFICE VISIT (OUTPATIENT)
Dept: CARDIOLOGY | Facility: CLINIC | Age: 71
End: 2021-04-23
Payer: MEDICARE

## 2021-04-23 VITALS
SYSTOLIC BLOOD PRESSURE: 135 MMHG | WEIGHT: 293 LBS | HEART RATE: 57 BPM | BODY MASS INDEX: 45.99 KG/M2 | HEIGHT: 67 IN | DIASTOLIC BLOOD PRESSURE: 72 MMHG

## 2021-04-23 DIAGNOSIS — I34.0 NON-RHEUMATIC MITRAL REGURGITATION: Chronic | ICD-10-CM

## 2021-04-23 DIAGNOSIS — Z79.02 LONG TERM (CURRENT) USE OF ANTITHROMBOTICS/ANTIPLATELETS: Chronic | ICD-10-CM

## 2021-04-23 DIAGNOSIS — I25.10 CORONARY ARTERY DISEASE INVOLVING NATIVE CORONARY ARTERY OF NATIVE HEART WITHOUT ANGINA PECTORIS: Primary | Chronic | ICD-10-CM

## 2021-04-23 DIAGNOSIS — E66.01 OBESITY, CLASS III, BMI 40-49.9 (MORBID OBESITY): Chronic | ICD-10-CM

## 2021-04-23 DIAGNOSIS — I10 ESSENTIAL HYPERTENSION: Chronic | ICD-10-CM

## 2021-04-23 DIAGNOSIS — I50.32 CHRONIC HEART FAILURE WITH PRESERVED EJECTION FRACTION: Chronic | ICD-10-CM

## 2021-04-23 PROBLEM — R01.1 UNDIAGNOSED CARDIAC MURMURS: Status: RESOLVED | Noted: 2018-06-28 | Resolved: 2021-04-23

## 2021-04-23 PROCEDURE — 99214 OFFICE O/P EST MOD 30 MIN: CPT | Mod: S$PBB,,, | Performed by: INTERNAL MEDICINE

## 2021-04-23 PROCEDURE — 99999 PR PBB SHADOW E&M-EST. PATIENT-LVL IV: ICD-10-PCS | Mod: PBBFAC,,, | Performed by: INTERNAL MEDICINE

## 2021-04-23 PROCEDURE — 99214 OFFICE O/P EST MOD 30 MIN: CPT | Mod: PBBFAC,PO | Performed by: INTERNAL MEDICINE

## 2021-04-23 PROCEDURE — 99999 PR PBB SHADOW E&M-EST. PATIENT-LVL IV: CPT | Mod: PBBFAC,,, | Performed by: INTERNAL MEDICINE

## 2021-04-23 PROCEDURE — 99214 PR OFFICE/OUTPT VISIT, EST, LEVL IV, 30-39 MIN: ICD-10-PCS | Mod: S$PBB,,, | Performed by: INTERNAL MEDICINE

## 2021-04-23 RX ORDER — HYDRALAZINE HYDROCHLORIDE 25 MG/1
50 TABLET, FILM COATED ORAL EVERY 8 HOURS
Qty: 180 TABLET | Refills: 0 | Status: SHIPPED | OUTPATIENT
Start: 2021-04-23 | End: 2022-03-08 | Stop reason: DRUGHIGH

## 2021-04-23 RX ORDER — ROSUVASTATIN CALCIUM 5 MG/1
5 TABLET, COATED ORAL DAILY
Qty: 90 TABLET | Refills: 1 | Status: SHIPPED | OUTPATIENT
Start: 2021-04-23 | End: 2021-06-28

## 2021-04-23 RX ORDER — LISINOPRIL 40 MG/1
40 TABLET ORAL DAILY
Qty: 90 TABLET | Refills: 1 | Status: SHIPPED | OUTPATIENT
Start: 2021-04-23 | End: 2021-04-28

## 2021-04-28 DIAGNOSIS — Z12.11 COLON CANCER SCREENING: ICD-10-CM

## 2021-05-10 ENCOUNTER — PATIENT MESSAGE (OUTPATIENT)
Dept: RESEARCH | Facility: HOSPITAL | Age: 71
End: 2021-05-10

## 2021-06-07 ENCOUNTER — TELEPHONE (OUTPATIENT)
Dept: CARDIOLOGY | Facility: CLINIC | Age: 71
End: 2021-06-07

## 2021-06-07 ENCOUNTER — TELEPHONE (OUTPATIENT)
Dept: PULMONOLOGY | Facility: CLINIC | Age: 71
End: 2021-06-07

## 2021-06-28 ENCOUNTER — OFFICE VISIT (OUTPATIENT)
Dept: FAMILY MEDICINE | Facility: CLINIC | Age: 71
End: 2021-06-28
Payer: MEDICARE

## 2021-06-28 VITALS
WEIGHT: 291.56 LBS | SYSTOLIC BLOOD PRESSURE: 132 MMHG | BODY MASS INDEX: 45.76 KG/M2 | HEIGHT: 67 IN | DIASTOLIC BLOOD PRESSURE: 70 MMHG

## 2021-06-28 DIAGNOSIS — M15.9 OSTEOARTHRITIS OF MULTIPLE JOINTS, UNSPECIFIED OSTEOARTHRITIS TYPE: ICD-10-CM

## 2021-06-28 DIAGNOSIS — E11.22 CKD STAGE 3 SECONDARY TO DIABETES: ICD-10-CM

## 2021-06-28 DIAGNOSIS — E66.01 MORBID OBESITY WITH BMI OF 45.0-49.9, ADULT: ICD-10-CM

## 2021-06-28 DIAGNOSIS — N28.89 RIGHT KIDNEY MASS: Primary | ICD-10-CM

## 2021-06-28 DIAGNOSIS — Z00.00 MEDICARE ANNUAL WELLNESS VISIT, SUBSEQUENT: ICD-10-CM

## 2021-06-28 DIAGNOSIS — R53.83 FATIGUE, UNSPECIFIED TYPE: ICD-10-CM

## 2021-06-28 DIAGNOSIS — Z85.3 HISTORY OF BREAST CANCER IN FEMALE: ICD-10-CM

## 2021-06-28 DIAGNOSIS — E78.2 MIXED HYPERLIPIDEMIA: ICD-10-CM

## 2021-06-28 DIAGNOSIS — R79.89 ABNORMAL CBC: ICD-10-CM

## 2021-06-28 DIAGNOSIS — M83.9 VITAMIN D DEFICIENT OSTEOMALACIA: ICD-10-CM

## 2021-06-28 DIAGNOSIS — E03.9 HYPOTHYROIDISM, UNSPECIFIED TYPE: ICD-10-CM

## 2021-06-28 DIAGNOSIS — E11.21 TYPE 2 DIABETES MELLITUS WITH DIABETIC NEPHROPATHY, WITHOUT LONG-TERM CURRENT USE OF INSULIN: ICD-10-CM

## 2021-06-28 DIAGNOSIS — N18.30 CKD STAGE 3 SECONDARY TO DIABETES: ICD-10-CM

## 2021-06-28 DIAGNOSIS — I50.30 DIASTOLIC HEART FAILURE SECONDARY TO HYPERTENSION: ICD-10-CM

## 2021-06-28 DIAGNOSIS — I27.20 PULMONARY HYPERTENSION: ICD-10-CM

## 2021-06-28 DIAGNOSIS — I11.0 DIASTOLIC HEART FAILURE SECONDARY TO HYPERTENSION: ICD-10-CM

## 2021-06-28 PROCEDURE — 99214 OFFICE O/P EST MOD 30 MIN: CPT | Mod: PBBFAC,PN | Performed by: INTERNAL MEDICINE

## 2021-06-28 PROCEDURE — 99999 PR PBB SHADOW E&M-EST. PATIENT-LVL IV: CPT | Mod: PBBFAC,,, | Performed by: INTERNAL MEDICINE

## 2021-06-28 PROCEDURE — 99214 PR OFFICE/OUTPT VISIT, EST, LEVL IV, 30-39 MIN: ICD-10-PCS | Mod: S$PBB,,, | Performed by: INTERNAL MEDICINE

## 2021-06-28 PROCEDURE — 99214 OFFICE O/P EST MOD 30 MIN: CPT | Mod: S$PBB,,, | Performed by: INTERNAL MEDICINE

## 2021-06-28 PROCEDURE — 99999 PR PBB SHADOW E&M-EST. PATIENT-LVL IV: ICD-10-PCS | Mod: PBBFAC,,, | Performed by: INTERNAL MEDICINE

## 2021-06-28 RX ORDER — ALIROCUMAB 75 MG/ML
INJECTION, SOLUTION SUBCUTANEOUS
COMMUNITY
Start: 2021-06-08 | End: 2021-09-16 | Stop reason: SDUPTHER

## 2021-06-28 RX ORDER — MONTELUKAST SODIUM 10 MG/1
10 TABLET ORAL NIGHTLY
Qty: 30 TABLET | Refills: 0 | Status: SHIPPED | OUTPATIENT
Start: 2021-06-28 | End: 2021-08-17

## 2021-07-02 ENCOUNTER — LAB VISIT (OUTPATIENT)
Dept: LAB | Facility: HOSPITAL | Age: 71
End: 2021-07-02
Attending: INTERNAL MEDICINE
Payer: MEDICARE

## 2021-07-02 DIAGNOSIS — N18.2 CHRONIC KIDNEY DISEASE, STAGE II (MILD): Primary | ICD-10-CM

## 2021-07-02 PROCEDURE — 80069 RENAL FUNCTION PANEL: CPT | Performed by: INTERNAL MEDICINE

## 2021-07-02 PROCEDURE — 36415 COLL VENOUS BLD VENIPUNCTURE: CPT | Mod: PO | Performed by: INTERNAL MEDICINE

## 2021-07-03 LAB
ALBUMIN SERPL BCP-MCNC: 3.2 G/DL (ref 3.5–5.2)
ANION GAP SERPL CALC-SCNC: 8 MMOL/L (ref 8–16)
BUN SERPL-MCNC: 25 MG/DL (ref 8–23)
CALCIUM SERPL-MCNC: 10.5 MG/DL (ref 8.7–10.5)
CHLORIDE SERPL-SCNC: 109 MMOL/L (ref 95–110)
CO2 SERPL-SCNC: 25 MMOL/L (ref 23–29)
CREAT SERPL-MCNC: 1.1 MG/DL (ref 0.5–1.4)
EST. GFR  (AFRICAN AMERICAN): 59.2 ML/MIN/1.73 M^2
EST. GFR  (NON AFRICAN AMERICAN): 51.3 ML/MIN/1.73 M^2
GLUCOSE SERPL-MCNC: 134 MG/DL (ref 70–110)
PHOSPHATE SERPL-MCNC: 2.8 MG/DL (ref 2.7–4.5)
POTASSIUM SERPL-SCNC: 4.5 MMOL/L (ref 3.5–5.1)
SODIUM SERPL-SCNC: 142 MMOL/L (ref 136–145)

## 2021-07-06 ENCOUNTER — TELEPHONE (OUTPATIENT)
Dept: CARDIOLOGY | Facility: CLINIC | Age: 71
End: 2021-07-06

## 2021-07-22 ENCOUNTER — PATIENT OUTREACH (OUTPATIENT)
Dept: ADMINISTRATIVE | Facility: OTHER | Age: 71
End: 2021-07-22

## 2021-07-27 ENCOUNTER — OFFICE VISIT (OUTPATIENT)
Dept: NEPHROLOGY | Facility: CLINIC | Age: 71
End: 2021-07-27
Payer: MEDICARE

## 2021-07-27 DIAGNOSIS — E11.22 CKD STAGE 3 SECONDARY TO DIABETES: Primary | ICD-10-CM

## 2021-07-27 DIAGNOSIS — N18.30 CKD STAGE 3 SECONDARY TO DIABETES: Primary | ICD-10-CM

## 2021-07-27 PROCEDURE — 99499 NO LOS: ICD-10-PCS | Mod: S$PBB,,, | Performed by: INTERNAL MEDICINE

## 2021-07-27 PROCEDURE — 99999 PR PBB SHADOW E&M-EST. PATIENT-LVL I: ICD-10-PCS | Mod: PBBFAC,,, | Performed by: INTERNAL MEDICINE

## 2021-07-27 PROCEDURE — 99499 UNLISTED E&M SERVICE: CPT | Mod: S$PBB,,, | Performed by: INTERNAL MEDICINE

## 2021-07-27 PROCEDURE — 99999 PR PBB SHADOW E&M-EST. PATIENT-LVL I: CPT | Mod: PBBFAC,,, | Performed by: INTERNAL MEDICINE

## 2021-07-27 PROCEDURE — 99211 OFF/OP EST MAY X REQ PHY/QHP: CPT | Mod: PBBFAC,PO | Performed by: INTERNAL MEDICINE

## 2021-07-27 RX ORDER — AMLODIPINE BESYLATE 10 MG/1
10 TABLET ORAL DAILY
Qty: 90 TABLET | Refills: 1 | Status: SHIPPED | OUTPATIENT
Start: 2021-07-27 | End: 2021-09-10 | Stop reason: SDUPTHER

## 2021-07-29 RX ORDER — FLUCONAZOLE 150 MG/1
150 TABLET ORAL DAILY
Qty: 3 TABLET | Refills: 0 | Status: SHIPPED | OUTPATIENT
Start: 2021-07-29 | End: 2021-08-01

## 2021-08-03 RX ORDER — MUPIROCIN 20 MG/G
OINTMENT TOPICAL
Status: ON HOLD | COMMUNITY
Start: 2021-04-06 | End: 2021-11-21 | Stop reason: HOSPADM

## 2021-08-03 RX ORDER — OMEPRAZOLE 40 MG/1
40 CAPSULE, DELAYED RELEASE ORAL DAILY PRN
Status: ON HOLD | COMMUNITY
Start: 2021-05-03 | End: 2022-03-13 | Stop reason: HOSPADM

## 2021-08-03 RX ORDER — AMOXICILLIN AND CLAVULANATE POTASSIUM 875; 125 MG/1; MG/1
TABLET, FILM COATED ORAL
COMMUNITY
Start: 2021-07-19 | End: 2021-08-23

## 2021-08-03 RX ORDER — LACTULOSE 10 G/15ML
30 SOLUTION ORAL; RECTAL
COMMUNITY
End: 2021-08-23

## 2021-08-04 ENCOUNTER — OFFICE VISIT (OUTPATIENT)
Dept: UROLOGY | Facility: CLINIC | Age: 71
End: 2021-08-04
Payer: MEDICARE

## 2021-08-04 ENCOUNTER — LAB VISIT (OUTPATIENT)
Dept: LAB | Facility: HOSPITAL | Age: 71
End: 2021-08-04
Attending: UROLOGY
Payer: MEDICARE

## 2021-08-04 VITALS — HEIGHT: 67 IN | WEIGHT: 291.44 LBS | BODY MASS INDEX: 45.74 KG/M2

## 2021-08-04 DIAGNOSIS — N28.89 RENAL MASS: ICD-10-CM

## 2021-08-04 DIAGNOSIS — N28.89 RENAL MASS: Primary | ICD-10-CM

## 2021-08-04 LAB
CREAT SERPL-MCNC: 1.3 MG/DL (ref 0.5–1.4)
EST. GFR  (AFRICAN AMERICAN): 48.4 ML/MIN/1.73 M^2
EST. GFR  (NON AFRICAN AMERICAN): 42 ML/MIN/1.73 M^2

## 2021-08-04 PROCEDURE — 99214 OFFICE O/P EST MOD 30 MIN: CPT | Mod: PBBFAC,PO | Performed by: UROLOGY

## 2021-08-04 PROCEDURE — 99999 PR PBB SHADOW E&M-EST. PATIENT-LVL IV: ICD-10-PCS | Mod: PBBFAC,,, | Performed by: UROLOGY

## 2021-08-04 PROCEDURE — 99999 PR PBB SHADOW E&M-EST. PATIENT-LVL IV: CPT | Mod: PBBFAC,,, | Performed by: UROLOGY

## 2021-08-04 PROCEDURE — 99214 PR OFFICE/OUTPT VISIT, EST, LEVL IV, 30-39 MIN: ICD-10-PCS | Mod: S$PBB,,, | Performed by: UROLOGY

## 2021-08-04 PROCEDURE — 36415 COLL VENOUS BLD VENIPUNCTURE: CPT | Mod: PO | Performed by: UROLOGY

## 2021-08-04 PROCEDURE — 99214 OFFICE O/P EST MOD 30 MIN: CPT | Mod: S$PBB,,, | Performed by: UROLOGY

## 2021-08-04 PROCEDURE — 82565 ASSAY OF CREATININE: CPT | Performed by: UROLOGY

## 2021-08-05 ENCOUNTER — HOSPITAL ENCOUNTER (OUTPATIENT)
Dept: RADIOLOGY | Facility: HOSPITAL | Age: 71
Discharge: HOME | End: 2021-08-05
Attending: UROLOGY
Payer: MEDICARE

## 2021-08-05 DIAGNOSIS — N28.89 RENAL MASS: ICD-10-CM

## 2021-08-05 PROCEDURE — 74178 CT ABD&PLV WO CNTR FLWD CNTR: CPT | Mod: TC

## 2021-08-05 PROCEDURE — 25500020 PHARM REV CODE 255: Performed by: UROLOGY

## 2021-08-05 RX ADMIN — IOHEXOL 100 ML: 350 INJECTION, SOLUTION INTRAVENOUS at 03:08

## 2021-08-10 ENCOUNTER — PATIENT MESSAGE (OUTPATIENT)
Dept: UROLOGY | Facility: CLINIC | Age: 71
End: 2021-08-10

## 2021-08-16 ENCOUNTER — LAB VISIT (OUTPATIENT)
Dept: LAB | Facility: HOSPITAL | Age: 71
End: 2021-08-16
Payer: MEDICARE

## 2021-08-16 DIAGNOSIS — E78.2 MIXED HYPERLIPIDEMIA: ICD-10-CM

## 2021-08-16 DIAGNOSIS — Z00.00 MEDICARE ANNUAL WELLNESS VISIT, SUBSEQUENT: ICD-10-CM

## 2021-08-16 DIAGNOSIS — M83.9 VITAMIN D DEFICIENT OSTEOMALACIA: ICD-10-CM

## 2021-08-16 DIAGNOSIS — R79.89 ABNORMAL CBC: ICD-10-CM

## 2021-08-16 DIAGNOSIS — E11.21 TYPE 2 DIABETES MELLITUS WITH DIABETIC NEPHROPATHY, WITHOUT LONG-TERM CURRENT USE OF INSULIN: ICD-10-CM

## 2021-08-16 DIAGNOSIS — R53.83 FATIGUE, UNSPECIFIED TYPE: ICD-10-CM

## 2021-08-16 DIAGNOSIS — N18.30 CKD STAGE 3 SECONDARY TO DIABETES: ICD-10-CM

## 2021-08-16 DIAGNOSIS — E11.22 CKD STAGE 3 SECONDARY TO DIABETES: ICD-10-CM

## 2021-08-16 LAB
BASOPHILS # BLD AUTO: 0.03 K/UL (ref 0–0.2)
BASOPHILS NFR BLD: 0.4 % (ref 0–1.9)
DIFFERENTIAL METHOD: ABNORMAL
EOSINOPHIL # BLD AUTO: 0.3 K/UL (ref 0–0.5)
EOSINOPHIL NFR BLD: 3.6 % (ref 0–8)
ERYTHROCYTE [DISTWIDTH] IN BLOOD BY AUTOMATED COUNT: 15.4 % (ref 11.5–14.5)
HCT VFR BLD AUTO: 38 % (ref 37–48.5)
HGB BLD-MCNC: 11.1 G/DL (ref 12–16)
IMM GRANULOCYTES # BLD AUTO: 0.01 K/UL (ref 0–0.04)
IMM GRANULOCYTES NFR BLD AUTO: 0.1 % (ref 0–0.5)
LYMPHOCYTES # BLD AUTO: 2 K/UL (ref 1–4.8)
LYMPHOCYTES NFR BLD: 27.2 % (ref 18–48)
MCH RBC QN AUTO: 25.2 PG (ref 27–31)
MCHC RBC AUTO-ENTMCNC: 29.2 G/DL (ref 32–36)
MCV RBC AUTO: 86 FL (ref 82–98)
MONOCYTES # BLD AUTO: 0.4 K/UL (ref 0.3–1)
MONOCYTES NFR BLD: 5.9 % (ref 4–15)
NEUTROPHILS # BLD AUTO: 4.6 K/UL (ref 1.8–7.7)
NEUTROPHILS NFR BLD: 62.8 % (ref 38–73)
NRBC BLD-RTO: 0 /100 WBC
PLATELET # BLD AUTO: 342 K/UL (ref 150–450)
PMV BLD AUTO: 10.3 FL (ref 9.2–12.9)
RBC # BLD AUTO: 4.41 M/UL (ref 4–5.4)
WBC # BLD AUTO: 7.25 K/UL (ref 3.9–12.7)

## 2021-08-16 PROCEDURE — 85025 COMPLETE CBC W/AUTO DIFF WBC: CPT | Performed by: INTERNAL MEDICINE

## 2021-08-16 PROCEDURE — 80053 COMPREHEN METABOLIC PANEL: CPT | Performed by: INTERNAL MEDICINE

## 2021-08-16 PROCEDURE — 84439 ASSAY OF FREE THYROXINE: CPT | Performed by: INTERNAL MEDICINE

## 2021-08-16 PROCEDURE — 36415 COLL VENOUS BLD VENIPUNCTURE: CPT | Mod: PN | Performed by: INTERNAL MEDICINE

## 2021-08-16 PROCEDURE — 80061 LIPID PANEL: CPT | Performed by: INTERNAL MEDICINE

## 2021-08-16 PROCEDURE — 82306 VITAMIN D 25 HYDROXY: CPT | Performed by: INTERNAL MEDICINE

## 2021-08-16 PROCEDURE — 84466 ASSAY OF TRANSFERRIN: CPT | Performed by: INTERNAL MEDICINE

## 2021-08-16 PROCEDURE — 83036 HEMOGLOBIN GLYCOSYLATED A1C: CPT | Performed by: INTERNAL MEDICINE

## 2021-08-16 PROCEDURE — 84443 ASSAY THYROID STIM HORMONE: CPT | Performed by: INTERNAL MEDICINE

## 2021-08-16 RX ORDER — ALLOPURINOL 300 MG/1
300 TABLET ORAL DAILY
Qty: 90 TABLET | Refills: 2 | Status: ON HOLD | OUTPATIENT
Start: 2021-08-16 | End: 2022-03-13 | Stop reason: SDUPTHER

## 2021-08-17 LAB
25(OH)D3+25(OH)D2 SERPL-MCNC: 29 NG/ML (ref 30–96)
ALBUMIN SERPL BCP-MCNC: 3.2 G/DL (ref 3.5–5.2)
ALP SERPL-CCNC: 89 U/L (ref 55–135)
ALT SERPL W/O P-5'-P-CCNC: 21 U/L (ref 10–44)
ANION GAP SERPL CALC-SCNC: 13 MMOL/L (ref 8–16)
AST SERPL-CCNC: 23 U/L (ref 10–40)
BILIRUB SERPL-MCNC: 0.3 MG/DL (ref 0.1–1)
BUN SERPL-MCNC: 21 MG/DL (ref 8–23)
CALCIUM SERPL-MCNC: 10.4 MG/DL (ref 8.7–10.5)
CHLORIDE SERPL-SCNC: 108 MMOL/L (ref 95–110)
CHOLEST SERPL-MCNC: 140 MG/DL (ref 120–199)
CHOLEST/HDLC SERPL: 2.6 {RATIO} (ref 2–5)
CO2 SERPL-SCNC: 22 MMOL/L (ref 23–29)
CREAT SERPL-MCNC: 1.2 MG/DL (ref 0.5–1.4)
EST. GFR  (AFRICAN AMERICAN): 53.3 ML/MIN/1.73 M^2
EST. GFR  (NON AFRICAN AMERICAN): 46.2 ML/MIN/1.73 M^2
ESTIMATED AVG GLUCOSE: 134 MG/DL (ref 68–131)
GLUCOSE SERPL-MCNC: 150 MG/DL (ref 70–110)
HBA1C MFR BLD: 6.3 % (ref 4–5.6)
HDLC SERPL-MCNC: 54 MG/DL (ref 40–75)
HDLC SERPL: 38.6 % (ref 20–50)
IRON SERPL-MCNC: 44 UG/DL (ref 30–160)
LDLC SERPL CALC-MCNC: 62.6 MG/DL (ref 63–159)
NONHDLC SERPL-MCNC: 86 MG/DL
POTASSIUM SERPL-SCNC: 4 MMOL/L (ref 3.5–5.1)
PROT SERPL-MCNC: 6.5 G/DL (ref 6–8.4)
SATURATED IRON: 12 % (ref 20–50)
SODIUM SERPL-SCNC: 143 MMOL/L (ref 136–145)
T4 FREE SERPL-MCNC: 0.97 NG/DL (ref 0.71–1.51)
TOTAL IRON BINDING CAPACITY: 371 UG/DL (ref 250–450)
TRANSFERRIN SERPL-MCNC: 251 MG/DL (ref 200–375)
TRIGL SERPL-MCNC: 117 MG/DL (ref 30–150)
TSH SERPL DL<=0.005 MIU/L-ACNC: 0.87 UIU/ML (ref 0.4–4)

## 2021-08-23 ENCOUNTER — TELEPHONE (OUTPATIENT)
Dept: CARDIOLOGY | Facility: CLINIC | Age: 71
End: 2021-08-23

## 2021-08-23 ENCOUNTER — PATIENT OUTREACH (OUTPATIENT)
Dept: ADMINISTRATIVE | Facility: HOSPITAL | Age: 71
End: 2021-08-23

## 2021-08-23 ENCOUNTER — OFFICE VISIT (OUTPATIENT)
Dept: FAMILY MEDICINE | Facility: CLINIC | Age: 71
End: 2021-08-23
Payer: MEDICARE

## 2021-08-23 VITALS
DIASTOLIC BLOOD PRESSURE: 76 MMHG | BODY MASS INDEX: 45.57 KG/M2 | WEIGHT: 290.38 LBS | HEIGHT: 67 IN | SYSTOLIC BLOOD PRESSURE: 132 MMHG

## 2021-08-23 DIAGNOSIS — E11.22 CKD STAGE 3 SECONDARY TO DIABETES: ICD-10-CM

## 2021-08-23 DIAGNOSIS — I25.10 CORONARY ARTERY DISEASE INVOLVING NATIVE CORONARY ARTERY OF NATIVE HEART WITHOUT ANGINA PECTORIS: ICD-10-CM

## 2021-08-23 DIAGNOSIS — E03.9 HYPOTHYROIDISM, UNSPECIFIED TYPE: ICD-10-CM

## 2021-08-23 DIAGNOSIS — M1A.09X0 IDIOPATHIC CHRONIC GOUT OF MULTIPLE SITES WITHOUT TOPHUS: ICD-10-CM

## 2021-08-23 DIAGNOSIS — D50.9 IRON DEFICIENCY ANEMIA, UNSPECIFIED IRON DEFICIENCY ANEMIA TYPE: ICD-10-CM

## 2021-08-23 DIAGNOSIS — E55.9 VITAMIN D DEFICIENCY: ICD-10-CM

## 2021-08-23 DIAGNOSIS — Z00.00 MEDICARE ANNUAL WELLNESS VISIT, SUBSEQUENT: Primary | ICD-10-CM

## 2021-08-23 DIAGNOSIS — I11.0 DIASTOLIC HEART FAILURE SECONDARY TO HYPERTENSION: ICD-10-CM

## 2021-08-23 DIAGNOSIS — E20.819 HYPOPARATHYROIDISM DUE TO IMPAIRED SECRETION OF PARATHYROID HORMONE (PTH): ICD-10-CM

## 2021-08-23 DIAGNOSIS — I50.30 DIASTOLIC HEART FAILURE SECONDARY TO HYPERTENSION: ICD-10-CM

## 2021-08-23 DIAGNOSIS — E66.01 OBESITY, CLASS III, BMI 40-49.9 (MORBID OBESITY): ICD-10-CM

## 2021-08-23 DIAGNOSIS — I27.20 PULMONARY HYPERTENSION: ICD-10-CM

## 2021-08-23 DIAGNOSIS — N28.89 RIGHT KIDNEY MASS: ICD-10-CM

## 2021-08-23 DIAGNOSIS — E11.21 TYPE 2 DIABETES MELLITUS WITH DIABETIC NEPHROPATHY, WITHOUT LONG-TERM CURRENT USE OF INSULIN: ICD-10-CM

## 2021-08-23 DIAGNOSIS — N18.30 CKD STAGE 3 SECONDARY TO DIABETES: ICD-10-CM

## 2021-08-23 PROCEDURE — G0439 PPPS, SUBSEQ VISIT: HCPCS | Mod: ,,, | Performed by: INTERNAL MEDICINE

## 2021-08-23 PROCEDURE — 99214 OFFICE O/P EST MOD 30 MIN: CPT | Mod: PBBFAC,PN | Performed by: INTERNAL MEDICINE

## 2021-08-23 PROCEDURE — 99214 OFFICE O/P EST MOD 30 MIN: CPT | Mod: 25,S$PBB,, | Performed by: INTERNAL MEDICINE

## 2021-08-23 PROCEDURE — 99214 PR OFFICE/OUTPT VISIT, EST, LEVL IV, 30-39 MIN: ICD-10-PCS | Mod: 25,S$PBB,, | Performed by: INTERNAL MEDICINE

## 2021-08-23 PROCEDURE — 99999 PR PBB SHADOW E&M-EST. PATIENT-LVL IV: ICD-10-PCS | Mod: PBBFAC,,, | Performed by: INTERNAL MEDICINE

## 2021-08-23 PROCEDURE — G0439 PR MEDICARE ANNUAL WELLNESS SUBSEQUENT VISIT: ICD-10-PCS | Mod: ,,, | Performed by: INTERNAL MEDICINE

## 2021-08-23 PROCEDURE — 99999 PR PBB SHADOW E&M-EST. PATIENT-LVL IV: CPT | Mod: PBBFAC,,, | Performed by: INTERNAL MEDICINE

## 2021-08-23 RX ORDER — FERROUS SULFATE 325(65) MG
325 TABLET ORAL DAILY
Qty: 90 TABLET | Refills: 1 | Status: ON HOLD | OUTPATIENT
Start: 2021-08-23 | End: 2022-03-13 | Stop reason: HOSPADM

## 2021-08-26 ENCOUNTER — PATIENT MESSAGE (OUTPATIENT)
Dept: CARDIOLOGY | Facility: CLINIC | Age: 71
End: 2021-08-26

## 2021-08-27 ENCOUNTER — PATIENT OUTREACH (OUTPATIENT)
Dept: ADMINISTRATIVE | Facility: HOSPITAL | Age: 71
End: 2021-08-27

## 2021-09-02 ENCOUNTER — PATIENT MESSAGE (OUTPATIENT)
Dept: CARDIOLOGY | Facility: CLINIC | Age: 71
End: 2021-09-02

## 2021-09-10 ENCOUNTER — OFFICE VISIT (OUTPATIENT)
Dept: CARDIOLOGY | Facility: CLINIC | Age: 71
End: 2021-09-10
Payer: MEDICARE

## 2021-09-10 VITALS
OXYGEN SATURATION: 95 % | HEIGHT: 67 IN | DIASTOLIC BLOOD PRESSURE: 76 MMHG | SYSTOLIC BLOOD PRESSURE: 132 MMHG | HEART RATE: 72 BPM | BODY MASS INDEX: 45.99 KG/M2 | WEIGHT: 293 LBS

## 2021-09-10 DIAGNOSIS — I25.83 CORONARY ARTERY DISEASE DUE TO LIPID RICH PLAQUE: Chronic | ICD-10-CM

## 2021-09-10 DIAGNOSIS — Z79.02 LONG TERM (CURRENT) USE OF ANTITHROMBOTICS/ANTIPLATELETS: Chronic | ICD-10-CM

## 2021-09-10 DIAGNOSIS — N18.30 CKD STAGE 3 SECONDARY TO DIABETES: Chronic | ICD-10-CM

## 2021-09-10 DIAGNOSIS — R06.02 SOB (SHORTNESS OF BREATH): Primary | ICD-10-CM

## 2021-09-10 DIAGNOSIS — I34.0 NON-RHEUMATIC MITRAL REGURGITATION: Chronic | ICD-10-CM

## 2021-09-10 DIAGNOSIS — I50.32 CHRONIC HEART FAILURE WITH PRESERVED EJECTION FRACTION: Chronic | ICD-10-CM

## 2021-09-10 DIAGNOSIS — E11.22 CKD STAGE 3 SECONDARY TO DIABETES: Chronic | ICD-10-CM

## 2021-09-10 DIAGNOSIS — E66.01 OBESITY, CLASS III, BMI 40-49.9 (MORBID OBESITY): Chronic | ICD-10-CM

## 2021-09-10 DIAGNOSIS — I25.10 CORONARY ARTERY DISEASE DUE TO LIPID RICH PLAQUE: Chronic | ICD-10-CM

## 2021-09-10 PROCEDURE — 99215 OFFICE O/P EST HI 40 MIN: CPT | Mod: PBBFAC,PO | Performed by: INTERNAL MEDICINE

## 2021-09-10 PROCEDURE — 99214 PR OFFICE/OUTPT VISIT, EST, LEVL IV, 30-39 MIN: ICD-10-PCS | Mod: S$PBB,,, | Performed by: INTERNAL MEDICINE

## 2021-09-10 PROCEDURE — 99214 OFFICE O/P EST MOD 30 MIN: CPT | Mod: S$PBB,,, | Performed by: INTERNAL MEDICINE

## 2021-09-10 PROCEDURE — 99999 PR PBB SHADOW E&M-EST. PATIENT-LVL V: CPT | Mod: PBBFAC,,, | Performed by: INTERNAL MEDICINE

## 2021-09-10 PROCEDURE — 99999 PR PBB SHADOW E&M-EST. PATIENT-LVL V: ICD-10-PCS | Mod: PBBFAC,,, | Performed by: INTERNAL MEDICINE

## 2021-09-10 RX ORDER — AMLODIPINE BESYLATE 10 MG/1
10 TABLET ORAL DAILY
Qty: 90 TABLET | Refills: 1 | Status: SHIPPED | OUTPATIENT
Start: 2021-09-10 | End: 2022-01-28 | Stop reason: SDUPTHER

## 2021-09-16 ENCOUNTER — HOSPITAL ENCOUNTER (OUTPATIENT)
Dept: RADIOLOGY | Facility: HOSPITAL | Age: 71
Discharge: HOME | End: 2021-09-16
Attending: INTERNAL MEDICINE
Payer: MEDICARE

## 2021-09-16 ENCOUNTER — CLINICAL SUPPORT (OUTPATIENT)
Dept: CARDIOLOGY | Facility: HOSPITAL | Age: 71
End: 2021-09-16
Attending: INTERNAL MEDICINE
Payer: MEDICARE

## 2021-09-16 VITALS — WEIGHT: 292 LBS | BODY MASS INDEX: 45.83 KG/M2 | HEIGHT: 67 IN

## 2021-09-16 DIAGNOSIS — R06.02 SOB (SHORTNESS OF BREATH): ICD-10-CM

## 2021-09-16 DIAGNOSIS — I25.10 CORONARY ARTERY DISEASE DUE TO LIPID RICH PLAQUE: Chronic | ICD-10-CM

## 2021-09-16 DIAGNOSIS — I25.83 CORONARY ARTERY DISEASE DUE TO LIPID RICH PLAQUE: Chronic | ICD-10-CM

## 2021-09-16 DIAGNOSIS — E78.5 HYPERLIPIDEMIA, UNSPECIFIED HYPERLIPIDEMIA TYPE: Primary | ICD-10-CM

## 2021-09-16 LAB
CV PHARM DOSE: 0.4 MG
CV STRESS BASE HR: 63 BPM
DIASTOLIC BLOOD PRESSURE: 99 MMHG
NUC REST EJECTION FRACTION: 30
OHS CV CPX 1 MINUTE RECOVERY HEART RATE: 83 BPM
OHS CV CPX 85 PERCENT MAX PREDICTED HEART RATE MALE: 123
OHS CV CPX MAX PREDICTED HEART RATE: 145
OHS CV CPX PATIENT IS FEMALE: 1
OHS CV CPX PATIENT IS MALE: 0
OHS CV CPX PEAK DIASTOLIC BLOOD PRESSURE: 84 MMHG
OHS CV CPX PEAK HEAR RATE: 93 BPM
OHS CV CPX PEAK RATE PRESSURE PRODUCT: NORMAL
OHS CV CPX PEAK SYSTOLIC BLOOD PRESSURE: 193 MMHG
OHS CV CPX PERCENT MAX PREDICTED HEART RATE ACHIEVED: 64
OHS CV CPX RATE PRESSURE PRODUCT PRESENTING: NORMAL
OHS CV PHARM TIME: 1408 MIN
SYSTOLIC BLOOD PRESSURE: 181 MMHG

## 2021-09-16 PROCEDURE — A9502 TC99M TETROFOSMIN: HCPCS | Mod: PO

## 2021-09-16 PROCEDURE — 93016 CV STRESS TEST SUPVJ ONLY: CPT | Mod: ,,, | Performed by: INTERNAL MEDICINE

## 2021-09-16 PROCEDURE — 78452 HT MUSCLE IMAGE SPECT MULT: CPT | Mod: 26,,, | Performed by: INTERNAL MEDICINE

## 2021-09-16 PROCEDURE — 93018 PR CARDIAC STRESS TST,INTERP/REPT ONLY: ICD-10-PCS | Mod: ,,, | Performed by: INTERNAL MEDICINE

## 2021-09-16 PROCEDURE — 78452 STRESS TEST WITH MYOCARDIAL PERFUSION (CUPID ONLY): ICD-10-PCS | Mod: 26,,, | Performed by: INTERNAL MEDICINE

## 2021-09-16 PROCEDURE — 93016 STRESS TEST WITH MYOCARDIAL PERFUSION (CUPID ONLY): ICD-10-PCS | Mod: ,,, | Performed by: INTERNAL MEDICINE

## 2021-09-16 PROCEDURE — 78452 HT MUSCLE IMAGE SPECT MULT: CPT | Mod: PO

## 2021-09-16 PROCEDURE — 93018 CV STRESS TEST I&R ONLY: CPT | Mod: ,,, | Performed by: INTERNAL MEDICINE

## 2021-09-16 PROCEDURE — 93017 CV STRESS TEST TRACING ONLY: CPT | Mod: PO

## 2021-09-16 PROCEDURE — 63600175 PHARM REV CODE 636 W HCPCS: Mod: PO | Performed by: INTERNAL MEDICINE

## 2021-09-16 RX ORDER — REGADENOSON 0.08 MG/ML
0.4 INJECTION, SOLUTION INTRAVENOUS ONCE
Status: COMPLETED | OUTPATIENT
Start: 2021-09-16 | End: 2021-09-16

## 2021-09-16 RX ORDER — ALIROCUMAB 75 MG/ML
75 INJECTION, SOLUTION SUBCUTANEOUS
Qty: 6 SYRINGE | Refills: 3 | Status: ON HOLD | OUTPATIENT
Start: 2021-09-16 | End: 2022-03-13 | Stop reason: HOSPADM

## 2021-09-16 RX ADMIN — REGADENOSON 0.4 MG: 0.08 INJECTION, SOLUTION INTRAVENOUS at 02:09

## 2021-09-17 ENCOUNTER — PATIENT MESSAGE (OUTPATIENT)
Dept: CARDIOLOGY | Facility: CLINIC | Age: 71
End: 2021-09-17

## 2021-09-17 RX ORDER — SACUBITRIL AND VALSARTAN 24; 26 MG/1; MG/1
1 TABLET, FILM COATED ORAL 2 TIMES DAILY
Qty: 28 TABLET | Refills: 0 | Status: SHIPPED | OUTPATIENT
Start: 2021-09-17 | End: 2021-10-01

## 2021-09-17 RX ORDER — SACUBITRIL AND VALSARTAN 49; 51 MG/1; MG/1
1 TABLET, FILM COATED ORAL 2 TIMES DAILY
Qty: 60 TABLET | Refills: 1 | Status: SHIPPED | OUTPATIENT
Start: 2021-10-02 | End: 2021-11-02

## 2021-09-21 ENCOUNTER — TELEPHONE (OUTPATIENT)
Dept: CARDIOLOGY | Facility: CLINIC | Age: 71
End: 2021-09-21

## 2021-09-22 ENCOUNTER — SPECIALTY PHARMACY (OUTPATIENT)
Dept: PHARMACY | Facility: CLINIC | Age: 71
End: 2021-09-22

## 2021-10-22 RX ORDER — POTASSIUM CHLORIDE 600 MG/1
8 TABLET, FILM COATED, EXTENDED RELEASE ORAL DAILY PRN
Qty: 90 TABLET | Refills: 2 | Status: ON HOLD | OUTPATIENT
Start: 2021-10-22 | End: 2022-03-13 | Stop reason: HOSPADM

## 2021-11-02 ENCOUNTER — TELEPHONE (OUTPATIENT)
Dept: CARDIOLOGY | Facility: CLINIC | Age: 71
End: 2021-11-02
Payer: MEDICARE

## 2021-11-15 ENCOUNTER — TELEPHONE (OUTPATIENT)
Dept: CARDIOLOGY | Facility: CLINIC | Age: 71
End: 2021-11-15
Payer: MEDICARE

## 2021-11-20 PROBLEM — R79.9 ELEVATED BUN: Status: ACTIVE | Noted: 2021-11-20

## 2021-11-20 PROBLEM — I50.22 CHRONIC SYSTOLIC HEART FAILURE: Status: ACTIVE | Noted: 2021-11-20

## 2021-11-30 ENCOUNTER — TELEPHONE (OUTPATIENT)
Dept: CARDIOLOGY | Facility: CLINIC | Age: 71
End: 2021-11-30
Payer: MEDICARE

## 2021-11-30 ENCOUNTER — TELEPHONE (OUTPATIENT)
Dept: FAMILY MEDICINE | Facility: CLINIC | Age: 71
End: 2021-11-30
Payer: MEDICARE

## 2021-12-02 DIAGNOSIS — E11.9 TYPE 2 DIABETES MELLITUS WITHOUT COMPLICATION, UNSPECIFIED WHETHER LONG TERM INSULIN USE: ICD-10-CM

## 2021-12-05 ENCOUNTER — TELEPHONE (OUTPATIENT)
Dept: FAMILY MEDICINE | Facility: CLINIC | Age: 71
End: 2021-12-05
Payer: MEDICARE

## 2021-12-06 ENCOUNTER — TELEPHONE (OUTPATIENT)
Dept: FAMILY MEDICINE | Facility: CLINIC | Age: 71
End: 2021-12-06
Payer: MEDICARE

## 2021-12-10 ENCOUNTER — OFFICE VISIT (OUTPATIENT)
Dept: CARDIOLOGY | Facility: CLINIC | Age: 71
End: 2021-12-10
Payer: MEDICARE

## 2021-12-10 VITALS
WEIGHT: 290.81 LBS | SYSTOLIC BLOOD PRESSURE: 155 MMHG | HEART RATE: 57 BPM | BODY MASS INDEX: 45.64 KG/M2 | DIASTOLIC BLOOD PRESSURE: 80 MMHG | HEIGHT: 67 IN

## 2021-12-10 DIAGNOSIS — I48.0 PAF (PAROXYSMAL ATRIAL FIBRILLATION): ICD-10-CM

## 2021-12-10 DIAGNOSIS — Z79.01 LONG TERM (CURRENT) USE OF ANTICOAGULANTS: ICD-10-CM

## 2021-12-10 DIAGNOSIS — I50.42 CHRONIC COMBINED SYSTOLIC AND DIASTOLIC HEART FAILURE: Primary | Chronic | ICD-10-CM

## 2021-12-10 DIAGNOSIS — E11.22 CKD STAGE 3 SECONDARY TO DIABETES: Chronic | ICD-10-CM

## 2021-12-10 DIAGNOSIS — N18.30 CKD STAGE 3 SECONDARY TO DIABETES: Chronic | ICD-10-CM

## 2021-12-10 DIAGNOSIS — Z79.899 LONG TERM CURRENT USE OF AMIODARONE: Chronic | ICD-10-CM

## 2021-12-10 DIAGNOSIS — E66.01 OBESITY, CLASS III, BMI 40-49.9 (MORBID OBESITY): Chronic | ICD-10-CM

## 2021-12-10 PROCEDURE — 99214 OFFICE O/P EST MOD 30 MIN: CPT | Mod: PBBFAC,PO | Performed by: INTERNAL MEDICINE

## 2021-12-10 PROCEDURE — 99214 PR OFFICE/OUTPT VISIT, EST, LEVL IV, 30-39 MIN: ICD-10-PCS | Mod: S$PBB,,, | Performed by: INTERNAL MEDICINE

## 2021-12-10 PROCEDURE — 99999 PR PBB SHADOW E&M-EST. PATIENT-LVL IV: CPT | Mod: PBBFAC,,, | Performed by: INTERNAL MEDICINE

## 2021-12-10 PROCEDURE — 99214 OFFICE O/P EST MOD 30 MIN: CPT | Mod: S$PBB,,, | Performed by: INTERNAL MEDICINE

## 2021-12-10 PROCEDURE — 99999 PR PBB SHADOW E&M-EST. PATIENT-LVL IV: ICD-10-PCS | Mod: PBBFAC,,, | Performed by: INTERNAL MEDICINE

## 2021-12-10 RX ORDER — ASPIRIN 81 MG/1
81 TABLET ORAL DAILY
Qty: 90 TABLET | Refills: 3 | Status: ON HOLD | OUTPATIENT
Start: 2021-12-10 | End: 2022-03-13 | Stop reason: HOSPADM

## 2021-12-10 RX ORDER — AMIODARONE HYDROCHLORIDE 100 MG/1
100 TABLET ORAL DAILY
Qty: 90 TABLET | Refills: 0 | Status: ON HOLD | OUTPATIENT
Start: 2021-12-10 | End: 2022-03-12

## 2021-12-10 RX ORDER — SACUBITRIL AND VALSARTAN 97; 103 MG/1; MG/1
1 TABLET, FILM COATED ORAL 2 TIMES DAILY
Qty: 180 TABLET | Refills: 1 | Status: ON HOLD | OUTPATIENT
Start: 2021-12-10 | End: 2022-03-13 | Stop reason: HOSPADM

## 2021-12-10 RX ORDER — FUROSEMIDE 40 MG/1
40 TABLET ORAL DAILY
Qty: 90 TABLET | Refills: 0 | Status: SHIPPED | OUTPATIENT
Start: 2021-12-10 | End: 2022-03-06

## 2021-12-23 ENCOUNTER — OFFICE VISIT (OUTPATIENT)
Dept: FAMILY MEDICINE | Facility: CLINIC | Age: 71
End: 2021-12-23
Payer: MEDICARE

## 2021-12-23 VITALS
SYSTOLIC BLOOD PRESSURE: 124 MMHG | WEIGHT: 291.44 LBS | DIASTOLIC BLOOD PRESSURE: 80 MMHG | BODY MASS INDEX: 45.74 KG/M2 | HEIGHT: 67 IN

## 2021-12-23 DIAGNOSIS — N28.89 RENAL MASS, RIGHT: ICD-10-CM

## 2021-12-23 DIAGNOSIS — I73.9 PERIPHERAL VASCULAR DISEASE, UNSPECIFIED: ICD-10-CM

## 2021-12-23 DIAGNOSIS — Z09 HOSPITAL DISCHARGE FOLLOW-UP: Primary | ICD-10-CM

## 2021-12-23 DIAGNOSIS — G89.29 OTHER CHRONIC BACK PAIN: ICD-10-CM

## 2021-12-23 DIAGNOSIS — D50.9 IRON DEFICIENCY ANEMIA, UNSPECIFIED IRON DEFICIENCY ANEMIA TYPE: ICD-10-CM

## 2021-12-23 DIAGNOSIS — E11.22 CKD STAGE 3 SECONDARY TO DIABETES: ICD-10-CM

## 2021-12-23 DIAGNOSIS — I48.0 PAROXYSMAL ATRIAL FIBRILLATION: ICD-10-CM

## 2021-12-23 DIAGNOSIS — M54.9 OTHER CHRONIC BACK PAIN: ICD-10-CM

## 2021-12-23 DIAGNOSIS — M25.50 ARTHRALGIA OF MULTIPLE JOINTS: ICD-10-CM

## 2021-12-23 DIAGNOSIS — E55.9 VITAMIN D DEFICIENCY: ICD-10-CM

## 2021-12-23 DIAGNOSIS — N18.30 CKD STAGE 3 SECONDARY TO DIABETES: ICD-10-CM

## 2021-12-23 DIAGNOSIS — F43.23 SITUATIONAL MIXED ANXIETY AND DEPRESSIVE DISORDER: ICD-10-CM

## 2021-12-23 PROCEDURE — 99215 PR OFFICE/OUTPT VISIT, EST, LEVL V, 40-54 MIN: ICD-10-PCS | Mod: S$PBB,,, | Performed by: INTERNAL MEDICINE

## 2021-12-23 PROCEDURE — 99215 OFFICE O/P EST HI 40 MIN: CPT | Mod: PBBFAC,PN | Performed by: INTERNAL MEDICINE

## 2021-12-23 PROCEDURE — 99999 PR PBB SHADOW E&M-EST. PATIENT-LVL V: CPT | Mod: PBBFAC,,, | Performed by: INTERNAL MEDICINE

## 2021-12-23 PROCEDURE — 99999 PR PBB SHADOW E&M-EST. PATIENT-LVL V: ICD-10-PCS | Mod: PBBFAC,,, | Performed by: INTERNAL MEDICINE

## 2021-12-23 PROCEDURE — 99215 OFFICE O/P EST HI 40 MIN: CPT | Mod: S$PBB,,, | Performed by: INTERNAL MEDICINE

## 2021-12-23 RX ORDER — ERGOCALCIFEROL 1.25 MG/1
50000 CAPSULE ORAL
Qty: 24 CAPSULE | Refills: 2 | Status: ON HOLD | OUTPATIENT
Start: 2021-12-23 | End: 2022-03-13 | Stop reason: HOSPADM

## 2021-12-23 RX ORDER — DULOXETIN HYDROCHLORIDE 20 MG/1
20 CAPSULE, DELAYED RELEASE ORAL DAILY
Qty: 30 CAPSULE | Refills: 1 | Status: SHIPPED | OUTPATIENT
Start: 2021-12-23 | End: 2022-02-14

## 2021-12-23 RX ORDER — HYDROCODONE BITARTRATE AND ACETAMINOPHEN 5; 325 MG/1; MG/1
1 TABLET ORAL EVERY 6 HOURS PRN
Qty: 20 TABLET | Refills: 0 | Status: SHIPPED | OUTPATIENT
Start: 2021-12-23 | End: 2021-12-30

## 2021-12-27 PROBLEM — I73.9 PERIPHERAL VASCULAR DISEASE, UNSPECIFIED: Status: ACTIVE | Noted: 2021-12-27

## 2021-12-27 PROBLEM — M25.50 ARTHRALGIA OF MULTIPLE JOINTS: Status: ACTIVE | Noted: 2021-12-27

## 2021-12-30 ENCOUNTER — LAB VISIT (OUTPATIENT)
Dept: LAB | Facility: HOSPITAL | Age: 71
End: 2021-12-30
Attending: INTERNAL MEDICINE
Payer: MEDICARE

## 2021-12-30 DIAGNOSIS — N18.2 CHRONIC KIDNEY DISEASE, STAGE II (MILD): ICD-10-CM

## 2021-12-30 DIAGNOSIS — M10.9 GOUT, UNSPECIFIED: ICD-10-CM

## 2021-12-30 DIAGNOSIS — I50.42 CHRONIC COMBINED SYSTOLIC AND DIASTOLIC HEART FAILURE: Chronic | ICD-10-CM

## 2021-12-30 DIAGNOSIS — E55.9 AVITAMINOSIS D: Primary | ICD-10-CM

## 2021-12-30 LAB
ALBUMIN SERPL BCP-MCNC: 3.2 G/DL (ref 3.5–5.2)
ALBUMIN/CREAT UR: 1613.8 UG/MG (ref 0–30)
ANION GAP SERPL CALC-SCNC: 9 MMOL/L (ref 8–16)
ANION GAP SERPL CALC-SCNC: 9 MMOL/L (ref 8–16)
BACTERIA #/AREA URNS AUTO: NORMAL /HPF
BASOPHILS # BLD AUTO: 0.04 K/UL (ref 0–0.2)
BASOPHILS NFR BLD: 0.7 % (ref 0–1.9)
BUN SERPL-MCNC: 28 MG/DL (ref 8–23)
BUN SERPL-MCNC: 28 MG/DL (ref 8–23)
CALCIUM SERPL-MCNC: 10.3 MG/DL (ref 8.7–10.5)
CALCIUM SERPL-MCNC: 10.4 MG/DL (ref 8.7–10.5)
CHLORIDE SERPL-SCNC: 109 MMOL/L (ref 95–110)
CHLORIDE SERPL-SCNC: 110 MMOL/L (ref 95–110)
CO2 SERPL-SCNC: 26 MMOL/L (ref 23–29)
CO2 SERPL-SCNC: 27 MMOL/L (ref 23–29)
CREAT SERPL-MCNC: 1.3 MG/DL (ref 0.5–1.4)
CREAT SERPL-MCNC: 1.4 MG/DL (ref 0.5–1.4)
CREAT UR-MCNC: 29 MG/DL (ref 15–325)
DIFFERENTIAL METHOD: ABNORMAL
EOSINOPHIL # BLD AUTO: 0.3 K/UL (ref 0–0.5)
EOSINOPHIL NFR BLD: 4.5 % (ref 0–8)
ERYTHROCYTE [DISTWIDTH] IN BLOOD BY AUTOMATED COUNT: 15.5 % (ref 11.5–14.5)
EST. GFR  (AFRICAN AMERICAN): 44.2 ML/MIN/1.73 M^2
EST. GFR  (AFRICAN AMERICAN): 48.4 ML/MIN/1.73 M^2
EST. GFR  (NON AFRICAN AMERICAN): 38.4 ML/MIN/1.73 M^2
EST. GFR  (NON AFRICAN AMERICAN): 42 ML/MIN/1.73 M^2
GLUCOSE SERPL-MCNC: 107 MG/DL (ref 70–110)
GLUCOSE SERPL-MCNC: 113 MG/DL (ref 70–110)
HCT VFR BLD AUTO: 36.5 % (ref 37–48.5)
HGB BLD-MCNC: 10.7 G/DL (ref 12–16)
IMM GRANULOCYTES # BLD AUTO: 0.01 K/UL (ref 0–0.04)
IMM GRANULOCYTES NFR BLD AUTO: 0.2 % (ref 0–0.5)
LYMPHOCYTES # BLD AUTO: 1.7 K/UL (ref 1–4.8)
LYMPHOCYTES NFR BLD: 28.8 % (ref 18–48)
MCH RBC QN AUTO: 25.2 PG (ref 27–31)
MCHC RBC AUTO-ENTMCNC: 29.3 G/DL (ref 32–36)
MCV RBC AUTO: 86 FL (ref 82–98)
MICROALBUMIN UR DL<=1MG/L-MCNC: 468 UG/ML
MICROSCOPIC COMMENT: NORMAL
MONOCYTES # BLD AUTO: 0.4 K/UL (ref 0.3–1)
MONOCYTES NFR BLD: 6.8 % (ref 4–15)
NEUTROPHILS # BLD AUTO: 3.4 K/UL (ref 1.8–7.7)
NEUTROPHILS NFR BLD: 59 % (ref 38–73)
NRBC BLD-RTO: 0 /100 WBC
PHOSPHATE SERPL-MCNC: 3 MG/DL (ref 2.7–4.5)
PLATELET # BLD AUTO: 268 K/UL (ref 150–450)
PMV BLD AUTO: 10 FL (ref 9.2–12.9)
POTASSIUM SERPL-SCNC: 3.8 MMOL/L (ref 3.5–5.1)
POTASSIUM SERPL-SCNC: 4.1 MMOL/L (ref 3.5–5.1)
PTH-INTACT SERPL-MCNC: 164.1 PG/ML (ref 9–77)
RBC # BLD AUTO: 4.25 M/UL (ref 4–5.4)
RBC #/AREA URNS AUTO: 1 /HPF (ref 0–4)
SODIUM SERPL-SCNC: 145 MMOL/L (ref 136–145)
SODIUM SERPL-SCNC: 145 MMOL/L (ref 136–145)
SQUAMOUS #/AREA URNS AUTO: 1 /HPF
URATE SERPL-MCNC: 5.7 MG/DL (ref 2.4–5.7)
WBC # BLD AUTO: 5.72 K/UL (ref 3.9–12.7)
WBC #/AREA URNS AUTO: 1 /HPF (ref 0–5)

## 2021-12-30 PROCEDURE — 36415 COLL VENOUS BLD VENIPUNCTURE: CPT | Mod: PN | Performed by: INTERNAL MEDICINE

## 2021-12-30 PROCEDURE — 80048 BASIC METABOLIC PNL TOTAL CA: CPT | Performed by: INTERNAL MEDICINE

## 2021-12-30 PROCEDURE — 82043 UR ALBUMIN QUANTITATIVE: CPT | Performed by: INTERNAL MEDICINE

## 2021-12-30 PROCEDURE — 85025 COMPLETE CBC W/AUTO DIFF WBC: CPT | Performed by: INTERNAL MEDICINE

## 2021-12-30 PROCEDURE — 84550 ASSAY OF BLOOD/URIC ACID: CPT | Performed by: INTERNAL MEDICINE

## 2021-12-30 PROCEDURE — 80069 RENAL FUNCTION PANEL: CPT | Performed by: INTERNAL MEDICINE

## 2021-12-30 PROCEDURE — 81001 URINALYSIS AUTO W/SCOPE: CPT | Performed by: INTERNAL MEDICINE

## 2021-12-30 PROCEDURE — 83970 ASSAY OF PARATHORMONE: CPT | Performed by: INTERNAL MEDICINE

## 2021-12-30 PROCEDURE — 82652 VIT D 1 25-DIHYDROXY: CPT | Performed by: INTERNAL MEDICINE

## 2022-01-04 LAB — 1,25(OH)2D3 SERPL-MCNC: 56 PG/ML (ref 20–79)

## 2022-01-16 NOTE — TELEPHONE ENCOUNTER
No new care gaps identified.  Powered by [employer]. Reference number: [id number].   1/16/2022 8:04:06 AM CST

## 2022-01-18 ENCOUNTER — TELEPHONE (OUTPATIENT)
Dept: FAMILY MEDICINE | Facility: CLINIC | Age: 72
End: 2022-01-18
Payer: MEDICARE

## 2022-01-18 NOTE — TELEPHONE ENCOUNTER
Spoke with pt.   Notified of Dr. Blanco recommendation. Pt verbalized understanding.  States she will call clinic back with results once home test is completed.

## 2022-01-18 NOTE — TELEPHONE ENCOUNTER
----- Message from Loretta Good sent at 1/18/2022  9:33 AM CST -----  Regarding: advice  Contact: Patient/[phone] (home)  Type: Needs Medical Advice  Who Called:  Patient/[phone] (home)     Symptoms (please be specific):  Coughing up mucus with blood tinged, congestion, extreme fatigued  How long has patient had these symptoms:  2 days  Pharmacy name and phone #:        [facility] - [place], [place] - [address]  [facility] [address]  Phone: [phone] Fax: [phone]    Additional Information: Please call to advise. Wants a chest xray. Thinks she might have pneumonia.

## 2022-01-18 NOTE — TELEPHONE ENCOUNTER
"Spoke w/ pt. She states she is fatigue, "blood tinged" mucus cough, no fever. She wants to know if she can just do a home COVID test, since she cannot get out to get tested here and pharmacy delivers. S/S started Sun. Please advise. Pt wondering if home test is neg, should she have CXR.  "

## 2022-01-18 NOTE — TELEPHONE ENCOUNTER
Yes she can due home covid. I recommend she come in and see me for eval.  Place her in 1 day holds

## 2022-01-19 ENCOUNTER — TELEPHONE (OUTPATIENT)
Dept: FAMILY MEDICINE | Facility: CLINIC | Age: 72
End: 2022-01-19

## 2022-01-19 NOTE — TELEPHONE ENCOUNTER
----- Message from Cynthia Ramirez sent at 1/19/2022  9:59 AM CST -----  Contact: Patient  Type:  Needs Medical Advice    Who Called:  Patient     Would the patient rather a call back or a response via [facility]?  Call    Best Call Back Number:  [phone] (home)     Additional Information:  Patient states she tested positive for covid yesterday and needs to know what medication she needs to get    Please call to advise

## 2022-01-19 NOTE — TELEPHONE ENCOUNTER
Spoke with pt, home COVID test is positive  She is c/o productive cough, (mucus is blood tinged)sinus congestion, fatigue  No fever  Using tylenol and black elderberry syrup  Asking what else Dr. Blanco recommends for her

## 2022-01-28 RX ORDER — AMLODIPINE BESYLATE 10 MG/1
10 TABLET ORAL DAILY
Qty: 90 TABLET | Refills: 1 | Status: SHIPPED | OUTPATIENT
Start: 2022-01-28 | End: 2022-03-08 | Stop reason: DRUGHIGH

## 2022-02-16 ENCOUNTER — LAB VISIT (OUTPATIENT)
Dept: LAB | Facility: HOSPITAL | Age: 72
End: 2022-02-16
Attending: INTERNAL MEDICINE
Payer: MEDICARE

## 2022-02-16 DIAGNOSIS — Z00.00 MEDICARE ANNUAL WELLNESS VISIT, SUBSEQUENT: ICD-10-CM

## 2022-02-16 DIAGNOSIS — E03.9 HYPOTHYROIDISM, UNSPECIFIED TYPE: ICD-10-CM

## 2022-02-16 DIAGNOSIS — M1A.09X0 IDIOPATHIC CHRONIC GOUT OF MULTIPLE SITES WITHOUT TOPHUS: ICD-10-CM

## 2022-02-16 DIAGNOSIS — D50.9 IRON DEFICIENCY ANEMIA, UNSPECIFIED IRON DEFICIENCY ANEMIA TYPE: ICD-10-CM

## 2022-02-16 DIAGNOSIS — E11.21 TYPE 2 DIABETES MELLITUS WITH DIABETIC NEPHROPATHY, WITHOUT LONG-TERM CURRENT USE OF INSULIN: ICD-10-CM

## 2022-02-16 DIAGNOSIS — E55.9 VITAMIN D DEFICIENCY: ICD-10-CM

## 2022-02-16 LAB
ALBUMIN SERPL BCP-MCNC: 3.2 G/DL (ref 3.5–5.2)
ALP SERPL-CCNC: 88 U/L (ref 55–135)
ALT SERPL W/O P-5'-P-CCNC: 21 U/L (ref 10–44)
ANION GAP SERPL CALC-SCNC: 9 MMOL/L (ref 8–16)
AST SERPL-CCNC: 15 U/L (ref 10–40)
BASOPHILS # BLD AUTO: 0.05 K/UL (ref 0–0.2)
BASOPHILS NFR BLD: 0.9 % (ref 0–1.9)
BILIRUB SERPL-MCNC: 0.4 MG/DL (ref 0.1–1)
BUN SERPL-MCNC: 27 MG/DL (ref 8–23)
CALCIUM SERPL-MCNC: 10.6 MG/DL (ref 8.7–10.5)
CHLORIDE SERPL-SCNC: 108 MMOL/L (ref 95–110)
CO2 SERPL-SCNC: 28 MMOL/L (ref 23–29)
CREAT SERPL-MCNC: 1.4 MG/DL (ref 0.5–1.4)
DIFFERENTIAL METHOD: ABNORMAL
EOSINOPHIL # BLD AUTO: 0.3 K/UL (ref 0–0.5)
EOSINOPHIL NFR BLD: 4.8 % (ref 0–8)
ERYTHROCYTE [DISTWIDTH] IN BLOOD BY AUTOMATED COUNT: 17.4 % (ref 11.5–14.5)
EST. GFR  (AFRICAN AMERICAN): 44.2 ML/MIN/1.73 M^2
EST. GFR  (NON AFRICAN AMERICAN): 38.4 ML/MIN/1.73 M^2
ESTIMATED AVG GLUCOSE: 111 MG/DL (ref 68–131)
GLUCOSE SERPL-MCNC: 107 MG/DL (ref 70–110)
HBA1C MFR BLD: 5.5 % (ref 4–5.6)
HCT VFR BLD AUTO: 35.2 % (ref 37–48.5)
HGB BLD-MCNC: 10.2 G/DL (ref 12–16)
IMM GRANULOCYTES # BLD AUTO: 0.02 K/UL (ref 0–0.04)
IMM GRANULOCYTES NFR BLD AUTO: 0.4 % (ref 0–0.5)
IRON SERPL-MCNC: 37 UG/DL (ref 30–160)
LYMPHOCYTES # BLD AUTO: 1.6 K/UL (ref 1–4.8)
LYMPHOCYTES NFR BLD: 28.3 % (ref 18–48)
MCH RBC QN AUTO: 25.4 PG (ref 27–31)
MCHC RBC AUTO-ENTMCNC: 29 G/DL (ref 32–36)
MCV RBC AUTO: 88 FL (ref 82–98)
MONOCYTES # BLD AUTO: 0.5 K/UL (ref 0.3–1)
MONOCYTES NFR BLD: 8.5 % (ref 4–15)
NEUTROPHILS # BLD AUTO: 3.3 K/UL (ref 1.8–7.7)
NEUTROPHILS NFR BLD: 57.1 % (ref 38–73)
NRBC BLD-RTO: 0 /100 WBC
PLATELET # BLD AUTO: 294 K/UL (ref 150–450)
PMV BLD AUTO: 10.3 FL (ref 9.2–12.9)
POTASSIUM SERPL-SCNC: 3.9 MMOL/L (ref 3.5–5.1)
PROT SERPL-MCNC: 6.5 G/DL (ref 6–8.4)
RBC # BLD AUTO: 4.01 M/UL (ref 4–5.4)
SATURATED IRON: 11 % (ref 20–50)
SODIUM SERPL-SCNC: 145 MMOL/L (ref 136–145)
T4 FREE SERPL-MCNC: 1.22 NG/DL (ref 0.71–1.51)
TOTAL IRON BINDING CAPACITY: 345 UG/DL (ref 250–450)
TRANSFERRIN SERPL-MCNC: 233 MG/DL (ref 200–375)
TSH SERPL DL<=0.005 MIU/L-ACNC: 0.67 UIU/ML (ref 0.4–4)
URATE SERPL-MCNC: 5.7 MG/DL (ref 2.4–5.7)
WBC # BLD AUTO: 5.68 K/UL (ref 3.9–12.7)

## 2022-02-16 PROCEDURE — 36415 COLL VENOUS BLD VENIPUNCTURE: CPT | Mod: PN | Performed by: INTERNAL MEDICINE

## 2022-02-16 PROCEDURE — 84439 ASSAY OF FREE THYROXINE: CPT | Performed by: INTERNAL MEDICINE

## 2022-02-16 PROCEDURE — 84443 ASSAY THYROID STIM HORMONE: CPT | Performed by: INTERNAL MEDICINE

## 2022-02-16 PROCEDURE — 85025 COMPLETE CBC W/AUTO DIFF WBC: CPT | Performed by: INTERNAL MEDICINE

## 2022-02-16 PROCEDURE — 83036 HEMOGLOBIN GLYCOSYLATED A1C: CPT | Performed by: INTERNAL MEDICINE

## 2022-02-16 PROCEDURE — 84466 ASSAY OF TRANSFERRIN: CPT | Performed by: INTERNAL MEDICINE

## 2022-02-16 PROCEDURE — 84550 ASSAY OF BLOOD/URIC ACID: CPT | Performed by: INTERNAL MEDICINE

## 2022-02-16 PROCEDURE — 80053 COMPREHEN METABOLIC PANEL: CPT | Performed by: INTERNAL MEDICINE

## 2022-02-23 ENCOUNTER — OFFICE VISIT (OUTPATIENT)
Dept: FAMILY MEDICINE | Facility: CLINIC | Age: 72
End: 2022-02-23
Payer: MEDICARE

## 2022-02-23 VITALS
DIASTOLIC BLOOD PRESSURE: 72 MMHG | SYSTOLIC BLOOD PRESSURE: 132 MMHG | HEIGHT: 67 IN | BODY MASS INDEX: 45.19 KG/M2 | WEIGHT: 287.94 LBS

## 2022-02-23 DIAGNOSIS — M25.561 CHRONIC PAIN OF RIGHT KNEE: Primary | ICD-10-CM

## 2022-02-23 DIAGNOSIS — I50.42 CHRONIC COMBINED SYSTOLIC AND DIASTOLIC HEART FAILURE: ICD-10-CM

## 2022-02-23 DIAGNOSIS — I27.20 PULMONARY HYPERTENSION: ICD-10-CM

## 2022-02-23 DIAGNOSIS — N18.30 CKD STAGE 3 SECONDARY TO DIABETES: ICD-10-CM

## 2022-02-23 DIAGNOSIS — I48.0 PAROXYSMAL ATRIAL FIBRILLATION: ICD-10-CM

## 2022-02-23 DIAGNOSIS — E11.22 CKD STAGE 3 SECONDARY TO DIABETES: ICD-10-CM

## 2022-02-23 DIAGNOSIS — E66.01 OBESITY, CLASS III, BMI 40-49.9 (MORBID OBESITY): ICD-10-CM

## 2022-02-23 DIAGNOSIS — I73.9 PERIPHERAL VASCULAR DISEASE, UNSPECIFIED: ICD-10-CM

## 2022-02-23 DIAGNOSIS — G89.29 CHRONIC PAIN OF RIGHT KNEE: Primary | ICD-10-CM

## 2022-02-23 DIAGNOSIS — E03.9 HYPOTHYROIDISM, UNSPECIFIED TYPE: ICD-10-CM

## 2022-02-23 DIAGNOSIS — N28.89 RENAL MASS, RIGHT: ICD-10-CM

## 2022-02-23 PROCEDURE — 99214 OFFICE O/P EST MOD 30 MIN: CPT | Mod: PBBFAC,PN | Performed by: INTERNAL MEDICINE

## 2022-02-23 PROCEDURE — 99999 PR PBB SHADOW E&M-EST. PATIENT-LVL IV: CPT | Mod: PBBFAC,,, | Performed by: INTERNAL MEDICINE

## 2022-02-23 PROCEDURE — 99999 PR PBB SHADOW E&M-EST. PATIENT-LVL IV: ICD-10-PCS | Mod: PBBFAC,,, | Performed by: INTERNAL MEDICINE

## 2022-02-23 PROCEDURE — 99214 PR OFFICE/OUTPT VISIT, EST, LEVL IV, 30-39 MIN: ICD-10-PCS | Mod: S$PBB,,, | Performed by: INTERNAL MEDICINE

## 2022-02-23 PROCEDURE — 99214 OFFICE O/P EST MOD 30 MIN: CPT | Mod: S$PBB,,, | Performed by: INTERNAL MEDICINE

## 2022-02-23 NOTE — PROGRESS NOTES
Subjective:   Note in      Patient ID: Juhi Taylor is a 71 y.o. female.    Gyn:  Hysterectomy partical & 1 ovary   MMG:  History of breast cancer.  3/2021 MRI breast done negative  Dexa:  02/2019 osteopenia  Colonoscopy:   yes 2014 or later Dr Payne    Immunizations: Flu:  Refuses Tdap:  Not sure Pneumovax:  Refused Prevnar 13:  Refused Shingles: Covid: defers recommend w medical hx   Smoker:  Former   Eye: [place]  Dr Ninh [facility]     Chief Complaint: Follow-up    Follow-up  Associated symptoms include arthralgias, fatigue and myalgias. Pertinent negatives include no chest pain or joint swelling.   Joint Pain  This is a chronic problem. The current episode started more than 1 month ago. Associated symptoms include arthralgias, fatigue and myalgias. Pertinent negatives include no chest pain or joint swelling.         Had covid + on home testing Jan 2022.  C/o lingering fatigue since.        New onset A Fib: 2021:  Stable Rx Eliquis, ASA 81, Amiodarone 100 -- mgmt w Cardio      Pre op March 7 and surgery plane March 10 at [facility] with Dr Ramirez for kidney mass. 10/2020 CT abdomen found right upper pole Kidney mass. Seen specialist at  -f/u fell through. Prev had cardiac stent now > 12 months out.        Reactive depression: tired the Cymbalta few day had bad nausea and stopped- not sure helped body pains.  //hx of Nephew that she raised passed away from Throat cancer age 50 brings up sadness when her son passed. Was prev easily tearful, sleep issues. Also dealing w daughter w substance abuse & mental illness who was recently committed. Grand kids in foster care.       CAD: stable. No CP// hx 3 stents Dec 2020. Off Plavix //Dr Kern      CHF/pulmonary hypertension:  Pressure 51, EF 35-40%, DD grade 2.  + chronic shortness of breath reduced exercise tolerance. Rx Entresto, Lasix.   Type 2 DM:  A1c 7.0 .  //see's Endo Rx;off glipizide and starting Jardince.   SE: diarrhea metformin   HTN:  controlled,  "multiple meds in the past taking amlodipine 10 causing leg swelling.  Rx Lasix at home prefers torsemide. .  HLD:  Controlled; Goal <70// LDL 62, triglycerides 117.   CKD stage 3 microalbumin positive:  GFR 43// prev seen renal Sylvia, been while.    Anemia chronic disease:  hb 10.9 iron Sat 12  -taking iron daily.    Elevated  hx   Hypothyroid: controlled Tsh 0.8   Vitamin-D def: missing doses Vit D2   Sleep Apnea: + CPAP/ using every day all night and w naps. Still working on diff masks that she likes.   Sleep testing: AHI 43 - auto bipap// 1/16/18: AHI 37.9 auto CPAP 8-20 mmHg .   Osteoarthritis: c/o lots of leg pains - wants to get appt w Rheum MD- knees hips lower back.  -back and legs are getting worse can hardly walk. C/o lower back pain right and radiation to hip _ _ also on off pains in knees and thighs -soreness -Gap helped a little - .  Gout: improving Rx Allopurinol 300. Highest 8.      Review of Systems:  Review of Systems   Constitutional: Positive for fatigue. Negative for appetite change.   HENT: Negative for nosebleeds.    Eyes: Negative for pain.   Respiratory: Negative for choking.    Cardiovascular: Negative for chest pain.   Gastrointestinal: Negative for blood in stool.   Genitourinary: Negative for hematuria.   Musculoskeletal: Positive for arthralgias and myalgias. Negative for joint swelling.   Skin: Negative for pallor.   Neurological: Negative for facial asymmetry.   Hematological: Does not bruise/bleed easily.   Psychiatric/Behavioral: Positive for dysphoric mood. Negative for confusion and sleep disturbance.       Objective:     Vitals:    02/23/22 1118   BP: 132/72   BP Location: Left arm   Weight: 130.6 kg (287 lb 14.7 oz)   Height: 5' 7" (1.702 m)          Physical Exam  Vitals reviewed.   Constitutional:       Appearance: Normal appearance. She is obese.   HENT:      Head: Normocephalic and atraumatic.      Mouth/Throat:      Pharynx: Oropharynx is clear.   Eyes:      " Extraocular Movements: Extraocular movements intact.      Conjunctiva/sclera: Conjunctivae normal.      Pupils: Pupils are equal, round, and reactive to light.   Cardiovascular:      Rate and Rhythm: Normal rate and regular rhythm.      Heart sounds: Normal heart sounds.   Pulmonary:      Effort: Pulmonary effort is normal.      Breath sounds: Normal breath sounds.   Abdominal:      General: Bowel sounds are normal.      Palpations: Abdomen is soft.   Musculoskeletal:         General: Normal range of motion.      Cervical back: Normal range of motion and neck supple.   Skin:     General: Skin is warm and dry.   Neurological:      General: No focal deficit present.      Mental Status: She is alert and oriented to person, place, and time.   Psychiatric:         Mood and Affect: Mood normal.         Medication List with Changes/Refills   Current Medications    ALBUTEROL (PROVENTIL/VENTOLIN HFA) 90 MCG/ACTUATION INHALER    Inhale 1-2 puffs into the lungs every 6 (six) hours as needed for Wheezing or Shortness of Breath. Rescue    ALLOPURINOL (ZYLOPRIM) 300 MG TABLET    Take 1 tablet (300 mg total) by mouth once daily.    AMIODARONE (PACERONE) 100 MG TAB    Take 1 tablet (100 mg total) by mouth once daily.    AMLODIPINE (NORVASC) 10 MG TABLET    Take 1 tablet (10 mg total) by mouth once daily.    ASPIRIN (ECOTRIN) 81 MG EC TABLET    Take 1 tablet (81 mg total) by mouth once daily.    BLOOD SUGAR DIAGNOSTIC STRP    To check BG2 times daily,and PRN hypoglycemia  to use with insurance preferred meter Diagnosis Code: E11.65    CARVEDILOL (COREG) 25 MG TABLET    TAKE ONE TABLET BY MOUTH TWICE DAILY    DULOXETINE (CYMBALTA) 20 MG CAPSULE    TAKE ONE CAPSULE BY MOUTH ONCE DAILY    ELIQUIS 5 MG TAB    TAKE ONE TABLET BY MOUTH TWICE DAILY    ERGOCALCIFEROL (VITAMIN D2) 50,000 UNIT CAP    Take 1 capsule (50,000 Units total) by mouth twice a week.    FERROUS SULFATE (FEOSOL) 325 MG (65 MG IRON) TAB TABLET    Take 1 tablet (325 mg  total) by mouth once daily.    FOLIC ACID (FOLVITE) 1 MG TABLET    Take 1 mg by mouth once daily.    HYDRALAZINE (APRESOLINE) 25 MG TABLET    Take 2 tablets (50 mg total) by mouth every 8 (eight) hours.    LANCETS MISC    To check BG 2 times daily,and PRN hypoglycemia to use with insurance preferred meter Diagnosis Code: E11.65    LEVOTHYROXINE (SYNTHROID) 50 MCG TABLET    TAKE ONE TABLET BY MOUTH EVERY MORNING ON an empty stomach    METFORMIN (GLUCOPHAGE-XR) 500 MG ER 24HR TABLET    Take 2 tablets (1,000 mg total) by mouth 2 (two) times daily with meals.    MONTELUKAST (SINGULAIR) 10 MG TABLET    TAKE ONE TABLET BY MOUTH EVERY EVENING FOR allergies    NITROGLYCERIN (NITROSTAT) 0.4 MG SL TABLET    Place 1 tablet (0.4 mg total) under the tongue every 5 (five) minutes as needed for Chest pain.    OMEPRAZOLE (PRILOSEC) 40 MG CAPSULE    Take 40 mg by mouth every morning.    POTASSIUM CHLORIDE (KLOR-CON) 8 MEQ TBSR    Take 1 tablet (8 mEq total) by mouth daily as needed (cramps).    PRALUENT PEN 75 MG/ML PNIJ    Inject 1 mL (75 mg total) into the skin every 14 (fourteen) days.    SACUBITRIL-VALSARTAN (ENTRESTO)  MG PER TABLET    Take 1 tablet by mouth 2 (two) times daily.   Changed and/or Refilled Medications    Modified Medication Previous Medication    FUROSEMIDE (LASIX) 40 MG TABLET furosemide (LASIX) 40 MG tablet       Take 1 tablet (40 mg total) by mouth once daily.    Take 1 tablet (40 mg total) by mouth once daily.       Assessment & Plan:     Chronic pain of right knee    Renal mass, right  Comments:  schedule for removal March 2022     Pulmonary hypertension    Paroxysmal atrial fibrillation    Chronic combined systolic and diastolic heart failure    Hypothyroidism, unspecified type    CKD stage 3 secondary to diabetes    Obesity, Class III, BMI 40-49.9 (morbid obesity)    Peripheral vascular disease, unspecified        Continue to work on regular exercise, maintain healthy weight, balanced diet. Avoid  unhealthy habits: smoking, excessive alcohol intake.

## 2022-03-07 ENCOUNTER — TELEPHONE (OUTPATIENT)
Dept: CARDIOLOGY | Facility: CLINIC | Age: 72
End: 2022-03-07
Payer: MEDICARE

## 2022-03-07 NOTE — TELEPHONE ENCOUNTER
----- Message from Jerilyn Taylor RN sent at 3/7/2022 11:01 AM CST -----  Regarding: surgery 3/10/2022  Ms Taylor is having surgery 3/10/2022 with Dr Ramirez.  She states her last dose of Eliquis was 3/4/2022 and ASA 3/3/2022.  What is your recommendations on holding ASA and Eliquis for surgery?  Anesthesia is requesting cardiac clearance for surgery.    Thank you,    Jerilyn Taylor RN  Pre-Admission

## 2022-03-08 ENCOUNTER — TELEPHONE (OUTPATIENT)
Dept: CARDIOLOGY | Facility: CLINIC | Age: 72
End: 2022-03-08
Payer: MEDICARE

## 2022-03-08 ENCOUNTER — OFFICE VISIT (OUTPATIENT)
Dept: CARDIOLOGY | Facility: CLINIC | Age: 72
End: 2022-03-08
Payer: MEDICARE

## 2022-03-08 VITALS
BODY MASS INDEX: 45.12 KG/M2 | WEIGHT: 287.5 LBS | HEIGHT: 67 IN | DIASTOLIC BLOOD PRESSURE: 89 MMHG | SYSTOLIC BLOOD PRESSURE: 139 MMHG | HEART RATE: 58 BPM

## 2022-03-08 DIAGNOSIS — T50.905A DRUG SIDE EFFECTS, INITIAL ENCOUNTER: Chronic | ICD-10-CM

## 2022-03-08 DIAGNOSIS — N18.30 CKD STAGE 3 SECONDARY TO DIABETES: Chronic | ICD-10-CM

## 2022-03-08 DIAGNOSIS — E66.01 OBESITY, CLASS III, BMI 40-49.9 (MORBID OBESITY): Chronic | ICD-10-CM

## 2022-03-08 DIAGNOSIS — I50.42 CHRONIC COMBINED SYSTOLIC AND DIASTOLIC HEART FAILURE: Primary | Chronic | ICD-10-CM

## 2022-03-08 DIAGNOSIS — I48.0 PAROXYSMAL ATRIAL FIBRILLATION: Chronic | ICD-10-CM

## 2022-03-08 DIAGNOSIS — E11.22 CKD STAGE 3 SECONDARY TO DIABETES: Chronic | ICD-10-CM

## 2022-03-08 PROCEDURE — 99999 PR PBB SHADOW E&M-EST. PATIENT-LVL IV: ICD-10-PCS | Mod: PBBFAC,,, | Performed by: INTERNAL MEDICINE

## 2022-03-08 PROCEDURE — 99214 OFFICE O/P EST MOD 30 MIN: CPT | Mod: S$PBB,,, | Performed by: INTERNAL MEDICINE

## 2022-03-08 PROCEDURE — 99214 PR OFFICE/OUTPT VISIT, EST, LEVL IV, 30-39 MIN: ICD-10-PCS | Mod: S$PBB,,, | Performed by: INTERNAL MEDICINE

## 2022-03-08 PROCEDURE — 99214 OFFICE O/P EST MOD 30 MIN: CPT | Mod: PBBFAC,PO | Performed by: INTERNAL MEDICINE

## 2022-03-08 PROCEDURE — 99999 PR PBB SHADOW E&M-EST. PATIENT-LVL IV: CPT | Mod: PBBFAC,,, | Performed by: INTERNAL MEDICINE

## 2022-03-08 RX ORDER — AMLODIPINE BESYLATE 5 MG/1
5 TABLET ORAL DAILY
Qty: 30 TABLET | Refills: 2 | Status: ON HOLD | OUTPATIENT
Start: 2022-03-08 | End: 2022-03-13 | Stop reason: SDUPTHER

## 2022-03-08 RX ORDER — HYDRALAZINE HYDROCHLORIDE 50 MG/1
50 TABLET, FILM COATED ORAL EVERY 12 HOURS
Qty: 60 TABLET | Refills: 3 | Status: ON HOLD | OUTPATIENT
Start: 2022-03-08 | End: 2022-03-13 | Stop reason: SDUPTHER

## 2022-03-08 NOTE — PROGRESS NOTES
"Subjective:    Patient ID:  Juhi Taylor is a 71 y.o. female who presents for Chronic combined systolic and diastolic heart failure        HPI  TO HAVE  ROBOTIC NEPHRECTOMY ON 3/10,POSTPONED B/O COVID, , HB 10, GFR 45, BP UP, EDEMA WITH AMLODIPINE, STOPPED ELIQUIS PER PCP FOR 5 DAYS, TAKING HYDRALAZINE 25 BID, SEE ROS    Past Medical History:   Diagnosis Date    Anticoagulant long-term use     Arthritis     Breast cancer 2014    invasive lobular carcinoma    Cancer of kidney 11/2020    RIGHT KIDNEY CANCER    CHF (congestive heart failure)     Coronary artery disease dx 2005    Depression     Diabetes mellitus     Diastolic heart failure secondary to hypertension     Gout     Hyperlipemia     Hypertension     Hypertrophy of nasal turbinates     Kidney mass 2020    Right    Levoscoliosis     Lung nodule     left    Multiple thyroid nodules     NICOLE (obstructive sleep apnea)     uses C-PAP    Pulmonary hypertension      Past Surgical History:   Procedure Laterality Date    AORTOGRAPHY N/A 12/04/2020    Procedure: Aortogram;  Surgeon: Paul Pedersen MD;  Location: [facility] CATH;  Service: Cardiology;  Laterality: N/A;    CARDIAC CATHETERIZATION  12/2020    CHOLECYSTECTOMY      COLONOSCOPY      multi -last 2014     ESOPHAGOGASTRODUODENOSCOPY      2012     HAND SURGERY Right     MASTECTOMY W/ SENTINEL NODE BIOPSY Bilateral 01/21/2015    bilateral "dog ears"    NASAL SINUS SURGERY  2015    Dr Bryant FESS/cauterization turbinate     PARTIAL HYSTERECTOMY  1989    PERCUTANEOUS TRANSLUMINAL BALLOON ANGIOPLASTY OF CORONARY ARTERY  12/04/2020    Procedure: Angioplasty-coronary;  Surgeon: Paul Pedersen MD;  Location: [facility] CATH;  Service: Cardiology;;    RENAL BIOPSY Right     9/20/2021 EJ    TUBAL LIGATION      ULTRASOUND GUIDANCE  12/04/2020    Procedure: Ultrasound Guidance;  Surgeon: Paul Pedersen MD;  Location: [facility] CATH;  Service: Cardiology;;     Family History   Problem Relation Age " of Onset    Breast cancer Mother     Stroke Father     Hypertension Father     Hepatitis Brother     Asthma Daughter     Birth defects Daughter         Nell's two children has cleft lips    Depression Daughter     Drug abuse Daughter     Learning disabilities Daughter     Mental illness Daughter     Breast cancer Maternal Aunt     Glaucoma Sister     Drug abuse Daughter     Macular degeneration Neg Hx     Retinal detachment Neg Hx      Social History     Socioeconomic History    Marital status: Single    Number of children: 4   Occupational History    Occupation: retired Psych aid    Tobacco Use    Smoking status: Former Smoker     Packs/day: 1.00     Years: 0.00     Pack years: 0.00     Types: Cigarettes     Quit date: 2016     Years since quittin.1    Smokeless tobacco: Never Used    Tobacco comment: quit    Substance and Sexual Activity    Alcohol use: No    Drug use: No    Sexual activity: Not Currently     Partners: Male       Review of patient's allergies indicates:   Allergen Reactions    Irbesartan Swelling    Januvia [sitagliptin]     Jardiance [empagliflozin]      Leg cramps    Lipitor [atorvastatin] Other (See Comments)     Severe leg pain    Linaclotide Other (See Comments) and Nausea And Vomiting     Does not remember    Lubiprostone Other (See Comments) and Palpitations     Does not remember       Current Outpatient Medications:     allopurinoL (ZYLOPRIM) 300 MG tablet, Take 1 tablet (300 mg total) by mouth once daily., Disp: 90 tablet, Rfl: 2    amiodarone (PACERONE) 100 MG Tab, Take 1 tablet (100 mg total) by mouth once daily., Disp: 90 tablet, Rfl: 0    aspirin (ECOTRIN) 81 MG EC tablet, Take 1 tablet (81 mg total) by mouth once daily., Disp: 90 tablet, Rfl: 3    blood sugar diagnostic Strp, To check BG2 times daily,and PRN hypoglycemia  to use with insurance preferred meter Diagnosis Code: E11.65, Disp: 100 each, Rfl: 1    carvediloL (COREG) 25  MG tablet, TAKE ONE TABLET BY MOUTH TWICE DAILY (Patient taking differently: Take 25 mg by mouth 2 (two) times daily.), Disp: 180 tablet, Rfl: 2    DULoxetine (CYMBALTA) 20 MG capsule, TAKE ONE CAPSULE BY MOUTH ONCE DAILY, Disp: 30 capsule, Rfl: 4    ELIQUIS 5 mg Tab, TAKE ONE TABLET BY MOUTH TWICE DAILY (Patient taking differently: Take 5 mg by mouth 2 (two) times daily.), Disp: 60 tablet, Rfl: 0    ergocalciferol (VITAMIN D2) 50,000 unit Cap, Take 1 capsule (50,000 Units total) by mouth twice a week. (Patient taking differently: Take 50,000 Units by mouth twice a week. Monday and Friday), Disp: 24 capsule, Rfl: 2    ferrous sulfate (FEOSOL) 325 mg (65 mg iron) Tab tablet, Take 1 tablet (325 mg total) by mouth once daily., Disp: 90 tablet, Rfl: 1    folic acid (FOLVITE) 1 MG tablet, Take 1 mg by mouth once daily., Disp: , Rfl:     furosemide (LASIX) 40 MG tablet, Take 1 tablet (40 mg total) by mouth once daily., Disp: 90 tablet, Rfl: 0    lancets Misc, To check BG 2 times daily,and PRN hypoglycemia to use with insurance preferred meter Diagnosis Code: E11.65, Disp: 100 each, Rfl: 1    levothyroxine (SYNTHROID) 50 MCG tablet, TAKE ONE TABLET BY MOUTH EVERY MORNING ON an empty stomach (Patient taking differently: Take 50 mcg by mouth before breakfast.), Disp: 90 tablet, Rfl: 1    metFORMIN (GLUCOPHAGE-XR) 500 MG ER 24hr tablet, Take 2 tablets (1,000 mg total) by mouth 2 (two) times daily with meals., Disp: 360 tablet, Rfl: 3    montelukast (SINGULAIR) 10 mg tablet, TAKE ONE TABLET BY MOUTH EVERY EVENING FOR allergies (Patient taking differently: Take 10 mg by mouth every evening.), Disp: 90 tablet, Rfl: 3    omeprazole (PRILOSEC) 40 MG capsule, Take 40 mg by mouth daily as needed., Disp: , Rfl:     potassium chloride (KLOR-CON) 8 MEQ TbSR, Take 1 tablet (8 mEq total) by mouth daily as needed (cramps). (Patient taking differently: Take 8 mEq by mouth once daily.), Disp: 90 tablet, Rfl: 2    PRALUENT PEN  75 mg/mL PnIj, Inject 1 mL (75 mg total) into the skin every 14 (fourteen) days., Disp: 6 Syringe, Rfl: 3    sacubitriL-valsartan (ENTRESTO)  mg per tablet, Take 1 tablet by mouth 2 (two) times daily., Disp: 180 tablet, Rfl: 1    albuterol (PROVENTIL/VENTOLIN HFA) 90 mcg/actuation inhaler, Inhale 1-2 puffs into the lungs every 6 (six) hours as needed for Wheezing or Shortness of Breath. Rescue, Disp: 6.7 g, Rfl: 0    amLODIPine (NORVASC) 5 MG tablet, Take 1 tablet (5 mg total) by mouth once daily., Disp: 30 tablet, Rfl: 2    hydrALAZINE (APRESOLINE) 50 MG tablet, Take 1 tablet (50 mg total) by mouth every 12 (twelve) hours., Disp: 60 tablet, Rfl: 3    nitroGLYCERIN (NITROSTAT) 0.4 MG SL tablet, Place 1 tablet (0.4 mg total) under the tongue every 5 (five) minutes as needed for Chest pain., Disp: 20 tablet, Rfl: 0    Review of Systems   Constitutional: Negative for chills, diaphoresis, fever and malaise/fatigue.   HENT: Negative.    Eyes: Negative.    Cardiovascular: Positive for dyspnea on exertion and leg swelling (WITH HIGH DOSE AMLODIPINE). Negative for chest pain, claudication, cyanosis, irregular heartbeat, near-syncope, orthopnea, palpitations, paroxysmal nocturnal dyspnea and syncope.   Respiratory: Negative for cough, hemoptysis and shortness of breath.    Endocrine: Negative.    Hematologic/Lymphatic: Negative for adenopathy. Does not bruise/bleed easily.   Skin: Negative for color change and rash.   Musculoskeletal: Negative for back pain (LESS) and falls.   Gastrointestinal: Negative for change in bowel habit, melena and nausea.   Genitourinary: Negative for dysuria and flank pain.   Neurological: Negative for focal weakness, headaches, light-headedness, loss of balance and weakness.   Psychiatric/Behavioral: Negative for altered mental status and memory loss.   Allergic/Immunologic: Negative.         Objective:      Vitals:    03/08/22 1100   BP: 139/89   Pulse: (!) 58   Weight: 130.4 kg (287  "lb 7.7 oz)   Height: 5' 6.5" (1.689 m)   PainSc: 0-No pain     Body mass index is 45.71 kg/m².    Physical Exam  Constitutional:       Appearance: She is obese.   HENT:      Head: Normocephalic and atraumatic.   Eyes:      General: No scleral icterus.     Extraocular Movements: Extraocular movements intact.   Neck:      Vascular: No carotid bruit.   Cardiovascular:      Rate and Rhythm: Normal rate and regular rhythm.      Pulses: Normal pulses.      Heart sounds: Murmur heard.     No friction rub. No gallop.   Pulmonary:      Effort: Pulmonary effort is normal.      Breath sounds: Normal breath sounds. No rales.   Abdominal:      Palpations: Abdomen is soft.      Tenderness: There is no abdominal tenderness.   Musculoskeletal:      Cervical back: Neck supple.      Right lower leg: Edema (TRACE) present.      Left lower leg: Edema (TRACE) present.      Comments: USES A CANE   Skin:     Capillary Refill: Capillary refill takes less than 2 seconds.   Neurological:      General: No focal deficit present.      Mental Status: She is alert and oriented to person, place, and time.   Psychiatric:         Mood and Affect: Mood normal.         Behavior: Behavior normal.                 ..    Chemistry        Component Value Date/Time     03/07/2022 1058    K 3.9 03/07/2022 1058     03/07/2022 1058    CO2 30 03/07/2022 1058    BUN 26 (H) 03/07/2022 1058    CREATININE 1.38 03/07/2022 1058     03/07/2022 1058        Component Value Date/Time    CALCIUM 9.5 03/07/2022 1058    ALKPHOS 88 02/16/2022 1038    AST 15 02/16/2022 1038    ALT 21 02/16/2022 1038    BILITOT 0.4 02/16/2022 1038    ESTGFRAFRICA 45 (A) 03/07/2022 1058    EGFRNONAA 39 (A) 03/07/2022 1058            ..  Lab Results   Component Value Date    CHOL 140 08/16/2021    CHOL 153 04/14/2021    CHOL 223 (H) 12/01/2020     Lab Results   Component Value Date    HDL 54 08/16/2021    HDL 45 04/14/2021    HDL 46 12/01/2020     Lab Results   Component Value " Date    LDLCALC 62.6 (L) 08/16/2021    LDLCALC 82.0 04/14/2021    LDLCALC 108.6 12/01/2020     Lab Results   Component Value Date    TRIG 117 08/16/2021    TRIG 130 04/14/2021    TRIG 342 (H) 12/01/2020     Lab Results   Component Value Date    CHOLHDL 38.6 08/16/2021    CHOLHDL 29.4 04/14/2021    CHOLHDL 20.6 12/01/2020     ..  Lab Results   Component Value Date    WBC 4.20 03/07/2022    HGB 10.0 (L) 03/07/2022    HCT 33.5 (L) 03/07/2022    MCV 86 03/07/2022     03/07/2022       Test(s) Reviewed  I have reviewed the following in detail:  [] Stress test   [] Angiography   [] Echocardiogram   [x] Labs   [] Other:       Assessment:         ICD-10-CM ICD-9-CM   1. Chronic combined systolic and diastolic heart failure  I50.42 428.42   2. Paroxysmal atrial fibrillation  I48.0 427.31   3. Drug side effects, initial encounter  T50.905A E947.9   4. Obesity, Class III, BMI 40-49.9 (morbid obesity)  E66.01 278.01   5. CKD stage 3 secondary to diabetes  E11.22 250.40    N18.30 585.3     Problem List Items Addressed This Visit        Cardiac/Vascular    Chronic combined systolic and diastolic heart failure - Primary    Relevant Orders    Basic Metabolic Panel    Paroxysmal atrial fibrillation       Renal/    CKD stage 3 secondary to diabetes    Relevant Orders    Basic Metabolic Panel       Endocrine    Obesity, Class III, BMI 40-49.9 (morbid obesity)       Other    Drug side effects, initial encounter           Plan:     DECREASE AMLODIPINE TO 5 AND BE CONSISTENT, INCREASE HYDRALAZINE TO 50 BID, DAILY WEIGHT, EXPLAINED 2 LB PER DAY 5 LB PER WEEK RULE, NO ANGINA CLASS 2 HEART FAILURE NO TIA TYPE SYMPTOMS NO NEAR-SYNCOPE STABLE CLINICAL ARRHYTHMIA NEEDS INCREASE ACTIVITY SIGNIFICANT WEIGHT LOSS IS NEEDED, RETURN TO CLINIC IN 3 MO WITH BMP      Chronic combined systolic and diastolic heart failure  Comments:  CLASS 2  Orders:  -     Basic Metabolic Panel; Future; Expected date: 06/08/2022    Paroxysmal atrial  fibrillation    Drug side effects, initial encounter  Comments:  ADJUST MEDS    Obesity, Class III, BMI 40-49.9 (morbid obesity)  Comments:  WEIGHT LOSS    CKD stage 3 secondary to diabetes  -     Basic Metabolic Panel; Future; Expected date: 06/08/2022    Other orders  -     hydrALAZINE (APRESOLINE) 50 MG tablet; Take 1 tablet (50 mg total) by mouth every 12 (twelve) hours.  Dispense: 60 tablet; Refill: 3  -     amLODIPine (NORVASC) 5 MG tablet; Take 1 tablet (5 mg total) by mouth once daily.  Dispense: 30 tablet; Refill: 2    RTC Low level/low impact aerobic exercise 5x's/wk. Heart healthy diet and risk factor modification.    See labs and med orders.    Aerobic exercise 5x's/wk. Heart healthy diet and risk factor modification.    See labs and med orders.

## 2022-03-10 PROBLEM — C64.1 RENAL CELL CARCINOMA OF RIGHT KIDNEY: Status: ACTIVE | Noted: 2022-03-10

## 2022-03-17 ENCOUNTER — HOSPITAL ENCOUNTER (INPATIENT)
Facility: HOSPITAL | Age: 72
LOS: 3 days | Discharge: HOME | DRG: 291 | End: 2022-03-20
Attending: EMERGENCY MEDICINE | Admitting: INTERNAL MEDICINE
Payer: MEDICARE

## 2022-03-17 DIAGNOSIS — R06.02 SHORTNESS OF BREATH: ICD-10-CM

## 2022-03-17 DIAGNOSIS — I50.9 CONGESTIVE HEART FAILURE, UNSPECIFIED HF CHRONICITY, UNSPECIFIED HEART FAILURE TYPE: ICD-10-CM

## 2022-03-17 DIAGNOSIS — I50.23 ACUTE ON CHRONIC SYSTOLIC CHF (CONGESTIVE HEART FAILURE): Primary | ICD-10-CM

## 2022-03-17 DIAGNOSIS — E11.9 TYPE 2 DIABETES MELLITUS WITHOUT COMPLICATION, WITHOUT LONG-TERM CURRENT USE OF INSULIN: ICD-10-CM

## 2022-03-17 DIAGNOSIS — E78.5 HYPERLIPIDEMIA, UNSPECIFIED HYPERLIPIDEMIA TYPE: ICD-10-CM

## 2022-03-17 LAB
ALBUMIN SERPL BCP-MCNC: 3.1 G/DL (ref 3.5–5.2)
ALP SERPL-CCNC: 106 U/L (ref 55–135)
ALT SERPL W/O P-5'-P-CCNC: 18 U/L (ref 10–44)
ANION GAP SERPL CALC-SCNC: 6 MMOL/L (ref 8–16)
AST SERPL-CCNC: 22 U/L (ref 10–40)
BASOPHILS # BLD AUTO: 0.02 K/UL (ref 0–0.2)
BASOPHILS NFR BLD: 0.2 % (ref 0–1.9)
BILIRUB SERPL-MCNC: 0.5 MG/DL (ref 0.1–1)
BILIRUB UR QL STRIP: NEGATIVE
BNP SERPL-MCNC: 1380 PG/ML (ref 0–99)
BUN SERPL-MCNC: 59 MG/DL (ref 8–23)
CALCIUM SERPL-MCNC: 9.6 MG/DL (ref 8.7–10.5)
CHLORIDE SERPL-SCNC: 105 MMOL/L (ref 95–110)
CLARITY UR: CLEAR
CO2 SERPL-SCNC: 27 MMOL/L (ref 23–29)
COLOR UR: COLORLESS
CREAT SERPL-MCNC: 3.5 MG/DL (ref 0.5–1.4)
DIFFERENTIAL METHOD: ABNORMAL
EOSINOPHIL # BLD AUTO: 0.5 K/UL (ref 0–0.5)
EOSINOPHIL NFR BLD: 5.7 % (ref 0–8)
ERYTHROCYTE [DISTWIDTH] IN BLOOD BY AUTOMATED COUNT: 16.9 % (ref 11.5–14.5)
EST. GFR  (AFRICAN AMERICAN): 14.6 ML/MIN/1.73 M^2
EST. GFR  (NON AFRICAN AMERICAN): 12.7 ML/MIN/1.73 M^2
GLUCOSE SERPL-MCNC: 124 MG/DL (ref 70–110)
GLUCOSE SERPL-MCNC: 166 MG/DL (ref 70–110)
GLUCOSE UR QL STRIP: NEGATIVE
HCT VFR BLD AUTO: 31.4 % (ref 37–48.5)
HGB BLD-MCNC: 9.5 G/DL (ref 12–16)
HGB UR QL STRIP: NEGATIVE
IMM GRANULOCYTES # BLD AUTO: 0.03 K/UL (ref 0–0.04)
IMM GRANULOCYTES NFR BLD AUTO: 0.4 % (ref 0–0.5)
INR PPP: 1.5
KETONES UR QL STRIP: NEGATIVE
LACTATE SERPL-SCNC: 0.8 MMOL/L (ref 0.5–1.9)
LEUKOCYTE ESTERASE UR QL STRIP: NEGATIVE
LYMPHOCYTES # BLD AUTO: 2.7 K/UL (ref 1–4.8)
LYMPHOCYTES NFR BLD: 32.4 % (ref 18–48)
MAGNESIUM SERPL-MCNC: 2.1 MG/DL (ref 1.6–2.6)
MCH RBC QN AUTO: 25.2 PG (ref 27–31)
MCHC RBC AUTO-ENTMCNC: 30.3 G/DL (ref 32–36)
MCV RBC AUTO: 83 FL (ref 82–98)
MONOCYTES # BLD AUTO: 0.7 K/UL (ref 0.3–1)
MONOCYTES NFR BLD: 7.9 % (ref 4–15)
NEUTROPHILS # BLD AUTO: 4.4 K/UL (ref 1.8–7.7)
NEUTROPHILS NFR BLD: 53.4 % (ref 38–73)
NITRITE UR QL STRIP: NEGATIVE
NRBC BLD-RTO: 0 /100 WBC
PH UR STRIP: 5 [PH] (ref 5–8)
PLATELET # BLD AUTO: 318 K/UL (ref 150–450)
PMV BLD AUTO: 9.7 FL (ref 9.2–12.9)
POTASSIUM SERPL-SCNC: 4.1 MMOL/L (ref 3.5–5.1)
PROT SERPL-MCNC: 6.4 G/DL (ref 6–8.4)
PROT UR QL STRIP: NEGATIVE
PROTHROMBIN TIME: 16.9 SEC (ref 11.4–13.7)
RBC # BLD AUTO: 3.77 M/UL (ref 4–5.4)
SARS-COV-2 RDRP RESP QL NAA+PROBE: NEGATIVE
SODIUM SERPL-SCNC: 138 MMOL/L (ref 136–145)
SP GR UR STRIP: 1.01 (ref 1–1.03)
TROPONIN I SERPL DL<=0.01 NG/ML-MCNC: 0.05 NG/ML
TSH SERPL DL<=0.005 MIU/L-ACNC: 0.67 UIU/ML (ref 0.34–5.6)
URN SPEC COLLECT METH UR: ABNORMAL
UROBILINOGEN UR STRIP-ACNC: NEGATIVE EU/DL
WBC # BLD AUTO: 8.25 K/UL (ref 3.9–12.7)

## 2022-03-17 PROCEDURE — 83605 ASSAY OF LACTIC ACID: CPT | Performed by: EMERGENCY MEDICINE

## 2022-03-17 PROCEDURE — 84443 ASSAY THYROID STIM HORMONE: CPT | Performed by: EMERGENCY MEDICINE

## 2022-03-17 PROCEDURE — 82962 GLUCOSE BLOOD TEST: CPT

## 2022-03-17 PROCEDURE — 21000000 HC CCU ICU ROOM CHARGE

## 2022-03-17 PROCEDURE — 93010 EKG 12-LEAD: ICD-10-PCS | Mod: ,,, | Performed by: INTERNAL MEDICINE

## 2022-03-17 PROCEDURE — 85610 PROTHROMBIN TIME: CPT | Performed by: NURSE PRACTITIONER

## 2022-03-17 PROCEDURE — 36415 COLL VENOUS BLD VENIPUNCTURE: CPT | Performed by: NURSE PRACTITIONER

## 2022-03-17 PROCEDURE — 99900035 HC TECH TIME PER 15 MIN (STAT)

## 2022-03-17 PROCEDURE — U0002 COVID-19 LAB TEST NON-CDC: HCPCS | Performed by: EMERGENCY MEDICINE

## 2022-03-17 PROCEDURE — 80053 COMPREHEN METABOLIC PANEL: CPT | Performed by: NURSE PRACTITIONER

## 2022-03-17 PROCEDURE — 25000003 PHARM REV CODE 250: Performed by: INTERNAL MEDICINE

## 2022-03-17 PROCEDURE — 63600175 PHARM REV CODE 636 W HCPCS: Performed by: EMERGENCY MEDICINE

## 2022-03-17 PROCEDURE — 85025 COMPLETE CBC W/AUTO DIFF WBC: CPT | Performed by: NURSE PRACTITIONER

## 2022-03-17 PROCEDURE — 99900031 HC PATIENT EDUCATION (STAT)

## 2022-03-17 PROCEDURE — 83880 ASSAY OF NATRIURETIC PEPTIDE: CPT | Performed by: NURSE PRACTITIONER

## 2022-03-17 PROCEDURE — 63600175 PHARM REV CODE 636 W HCPCS: Performed by: INTERNAL MEDICINE

## 2022-03-17 PROCEDURE — 81003 URINALYSIS AUTO W/O SCOPE: CPT | Performed by: INTERNAL MEDICINE

## 2022-03-17 PROCEDURE — 99285 EMERGENCY DEPT VISIT HI MDM: CPT | Mod: 25

## 2022-03-17 PROCEDURE — 96374 THER/PROPH/DIAG INJ IV PUSH: CPT

## 2022-03-17 PROCEDURE — 94761 N-INVAS EAR/PLS OXIMETRY MLT: CPT

## 2022-03-17 PROCEDURE — 83735 ASSAY OF MAGNESIUM: CPT | Performed by: NURSE PRACTITIONER

## 2022-03-17 PROCEDURE — 93005 ELECTROCARDIOGRAM TRACING: CPT | Performed by: INTERNAL MEDICINE

## 2022-03-17 PROCEDURE — 84484 ASSAY OF TROPONIN QUANT: CPT | Performed by: NURSE PRACTITIONER

## 2022-03-17 PROCEDURE — 93010 ELECTROCARDIOGRAM REPORT: CPT | Mod: ,,, | Performed by: INTERNAL MEDICINE

## 2022-03-17 PROCEDURE — 87040 BLOOD CULTURE FOR BACTERIA: CPT | Performed by: EMERGENCY MEDICINE

## 2022-03-17 RX ORDER — SODIUM BICARBONATE 650 MG/1
650 TABLET ORAL ONCE
Status: DISCONTINUED | OUTPATIENT
Start: 2022-03-17 | End: 2022-03-18

## 2022-03-17 RX ORDER — AMLODIPINE BESYLATE 5 MG/1
5 TABLET ORAL DAILY
Status: DISCONTINUED | OUTPATIENT
Start: 2022-03-18 | End: 2022-03-17

## 2022-03-17 RX ORDER — FUROSEMIDE 10 MG/ML
40 INJECTION INTRAMUSCULAR; INTRAVENOUS DAILY
Status: DISCONTINUED | OUTPATIENT
Start: 2022-03-17 | End: 2022-03-19

## 2022-03-17 RX ORDER — ACETAMINOPHEN 500 MG
1000 TABLET ORAL ONCE
Status: COMPLETED | OUTPATIENT
Start: 2022-03-17 | End: 2022-03-17

## 2022-03-17 RX ORDER — HYDRALAZINE HYDROCHLORIDE 25 MG/1
50 TABLET, FILM COATED ORAL EVERY 8 HOURS
Status: DISCONTINUED | OUTPATIENT
Start: 2022-03-17 | End: 2022-03-18

## 2022-03-17 RX ORDER — HYDRALAZINE HYDROCHLORIDE 20 MG/ML
10 INJECTION INTRAMUSCULAR; INTRAVENOUS EVERY 4 HOURS PRN
Status: DISCONTINUED | OUTPATIENT
Start: 2022-03-17 | End: 2022-03-20 | Stop reason: HOSPADM

## 2022-03-17 RX ORDER — SACUBITRIL AND VALSARTAN 49; 51 MG/1; MG/1
1 TABLET, FILM COATED ORAL 2 TIMES DAILY
Status: ON HOLD | COMMUNITY
Start: 2021-12-29 | End: 2022-03-20 | Stop reason: HOSPADM

## 2022-03-17 RX ORDER — AMIODARONE HYDROCHLORIDE 100 MG/1
100 TABLET ORAL DAILY
Status: DISCONTINUED | OUTPATIENT
Start: 2022-03-18 | End: 2022-03-18

## 2022-03-17 RX ORDER — LEVOTHYROXINE SODIUM 25 UG/1
50 TABLET ORAL
Status: DISCONTINUED | OUTPATIENT
Start: 2022-03-18 | End: 2022-03-20 | Stop reason: HOSPADM

## 2022-03-17 RX ORDER — AMLODIPINE BESYLATE 5 MG/1
5 TABLET ORAL DAILY
Status: DISCONTINUED | OUTPATIENT
Start: 2022-03-17 | End: 2022-03-18

## 2022-03-17 RX ORDER — SODIUM CHLORIDE 0.9 % (FLUSH) 0.9 %
2 SYRINGE (ML) INJECTION EVERY 6 HOURS PRN
Status: DISCONTINUED | OUTPATIENT
Start: 2022-03-17 | End: 2022-03-20 | Stop reason: HOSPADM

## 2022-03-17 RX ORDER — CARVEDILOL 25 MG/1
25 TABLET ORAL 2 TIMES DAILY
Status: DISCONTINUED | OUTPATIENT
Start: 2022-03-17 | End: 2022-03-20 | Stop reason: HOSPADM

## 2022-03-17 RX ORDER — ONDANSETRON 2 MG/ML
4 INJECTION INTRAMUSCULAR; INTRAVENOUS
Status: COMPLETED | OUTPATIENT
Start: 2022-03-17 | End: 2022-03-17

## 2022-03-17 RX ORDER — MONTELUKAST SODIUM 10 MG/1
10 TABLET ORAL DAILY
Status: DISCONTINUED | OUTPATIENT
Start: 2022-03-18 | End: 2022-03-20 | Stop reason: HOSPADM

## 2022-03-17 RX ADMIN — FUROSEMIDE 40 MG: 10 INJECTION, SOLUTION INTRAMUSCULAR; INTRAVENOUS at 07:03

## 2022-03-17 RX ADMIN — ONDANSETRON 4 MG: 2 INJECTION INTRAMUSCULAR; INTRAVENOUS at 06:03

## 2022-03-17 RX ADMIN — HYDRALAZINE HYDROCHLORIDE 50 MG: 25 TABLET ORAL at 07:03

## 2022-03-17 RX ADMIN — CARVEDILOL 25 MG: 25 TABLET, FILM COATED ORAL at 10:03

## 2022-03-17 RX ADMIN — ACETAMINOPHEN 1000 MG: 500 TABLET ORAL at 07:03

## 2022-03-17 RX ADMIN — AMLODIPINE BESYLATE 5 MG: 5 TABLET ORAL at 07:03

## 2022-03-17 NOTE — ED NOTES
Patient refused second blood culture set to be drawn via straight stick. Received from similar arm.

## 2022-03-17 NOTE — ED PROVIDER NOTES
Encounter Date: 3/17/2022       History     Chief Complaint   Patient presents with    Shortness of Breath     71-year-old female presents complaining of shortness of breath, patient reports shortness of breath on ambulation patient also reports 18 lb weight gain in the past week patient reports that she had a nephrectomy secondary to renal cell carcinoma at [facility] on March 10th patient reports that she has been recovering well but they took her off her Lasix and she has noticed a weight gain.  Patient is concerned that she has fluid overload patient reports shortness of breath on ambulation patient denies fever patient denies cough patient denies chest pain        Review of patient's allergies indicates:   Allergen Reactions    Percocet [oxycodone-acetaminophen] Itching    Irbesartan Swelling    Januvia [sitagliptin]     Jardiance [empagliflozin]      Leg cramps    Lipitor [atorvastatin] Other (See Comments)     Severe leg pain    Linaclotide Other (See Comments) and Nausea And Vomiting     Does not remember    Lubiprostone Other (See Comments) and Palpitations     Does not remember     Past Medical History:   Diagnosis Date    Anticoagulant long-term use     Arthritis     Breast cancer 2014    invasive lobular carcinoma    Cancer of kidney 11/2020    RIGHT KIDNEY CANCER    CHF (congestive heart failure)     Coronary artery disease dx 2005    Depression     Diabetes mellitus     Diastolic heart failure secondary to hypertension     Gout     Hyperlipemia     Hypertension     Hypertrophy of nasal turbinates     Kidney mass 2020    Right    Levoscoliosis     Lung nodule     left    Multiple thyroid nodules     NICOLE (obstructive sleep apnea)     uses C-PAP    Pulmonary hypertension      Past Surgical History:   Procedure Laterality Date    AORTOGRAPHY N/A 12/04/2020    Procedure: Aortogram;  Surgeon: Paul Pedersen MD;  Location: [facility];  Service: Cardiology;  Laterality:  "N/A;    CARDIAC CATHETERIZATION  2020    CHOLECYSTECTOMY      COLONOSCOPY      multi -last      ESOPHAGOGASTRODUODENOSCOPY      2012     HAND SURGERY Right     LAPAROSCOPIC ROBOT-ASSISTED SURGICAL REMOVAL OF KIDNEY USING DA CHELLE XI Right 3/10/2022    Procedure: XI ROBOTIC NEPHRECTOMY- radical;  Surgeon: Rolando Ramirez MD;  Location: [facility] OR;  Service: Urology;  Laterality: Right;    MASTECTOMY W/ SENTINEL NODE BIOPSY Bilateral 2015    bilateral "dog ears"    NASAL SINUS SURGERY      Dr Bryant FESS/cauterization turbinate     PARTIAL HYSTERECTOMY      PERCUTANEOUS TRANSLUMINAL BALLOON ANGIOPLASTY OF CORONARY ARTERY  2020    Procedure: Angioplasty-coronary;  Surgeon: Paul Peedrsen MD;  Location: [facility] CATH;  Service: Cardiology;;    RENAL BIOPSY Right     2021 EJ    TUBAL LIGATION      ULTRASOUND GUIDANCE  2020    Procedure: Ultrasound Guidance;  Surgeon: Paul Pedersen MD;  Location: [facility] CATH;  Service: Cardiology;;     Family History   Problem Relation Age of Onset    Breast cancer Mother     Stroke Father     Hypertension Father     Hepatitis Brother     Asthma Daughter     Birth defects Daughter         Nell's two children has cleft lips    Depression Daughter     Drug abuse Daughter     Learning disabilities Daughter     Mental illness Daughter     Breast cancer Maternal Aunt     Glaucoma Sister     Drug abuse Daughter     Macular degeneration Neg Hx     Retinal detachment Neg Hx      Social History     Tobacco Use    Smoking status: Former Smoker     Packs/day: 1.00     Years: 0.00     Pack years: 0.00     Types: Cigarettes     Quit date: 2016     Years since quittin.2    Smokeless tobacco: Never Used    Tobacco comment: quit    Substance Use Topics    Alcohol use: No    Drug use: No     Review of Systems   Constitutional: Negative for fever.   HENT: Negative for congestion, rhinorrhea, sore throat and trouble " swallowing.    Eyes: Negative for visual disturbance.   Respiratory: Positive for shortness of breath. Negative for cough, chest tightness and wheezing.    Cardiovascular: Positive for leg swelling. Negative for chest pain and palpitations.   Gastrointestinal: Negative for abdominal distention, abdominal pain, constipation, diarrhea, nausea and vomiting.   Genitourinary: Negative for difficulty urinating, dysuria, flank pain and frequency.   Musculoskeletal: Negative for arthralgias, back pain, joint swelling and neck pain.   Skin: Negative for color change and rash.   Neurological: Negative for dizziness, syncope, speech difficulty, weakness, numbness and headaches.   All other systems reviewed and are negative.      Physical Exam     Initial Vitals [03/17/22 1522]   BP Pulse Resp Temp SpO2   (!) 176/88 (!) 58 18 98.2 °F (36.8 °C) 96 %      MAP       --         Physical Exam    Nursing note and vitals reviewed.  Constitutional: She appears well-developed and well-nourished. She is not diaphoretic. No distress.   HENT:   Head: Normocephalic and atraumatic.   Right Ear: External ear normal.   Left Ear: External ear normal.   Nose: Nose normal.   Mouth/Throat: Oropharynx is clear and moist. No oropharyngeal exudate.   Eyes: Conjunctivae and EOM are normal. Pupils are equal, round, and reactive to light. Right eye exhibits no discharge. Left eye exhibits no discharge. No scleral icterus.   Neck: Neck supple. No thyromegaly present. No tracheal deviation present. No JVD present.   Normal range of motion.  Cardiovascular: Normal rate, regular rhythm, normal heart sounds and intact distal pulses. Exam reveals no gallop and no friction rub.    No murmur heard.  Pulmonary/Chest: No stridor. No respiratory distress. She has no wheezes. She has no rhonchi. She has no rales. She exhibits no tenderness.   Mildly coarse breath sounds bilaterally   Abdominal: Abdomen is soft. Bowel sounds are normal. She exhibits no distension  and no mass. There is no abdominal tenderness.   Patient's surgical sites are clean dry and intact, abdomen is grossly nontender There is no rebound and no guarding.   Musculoskeletal:         General: Edema present. No tenderness. Normal range of motion.      Cervical back: Normal range of motion and neck supple.      Comments: 1+ edema bilateral lower extremities     Lymphadenopathy:     She has no cervical adenopathy.   Neurological: She is alert and oriented to person, place, and time. She has normal strength. No cranial nerve deficit or sensory deficit.   Skin: Skin is warm and dry. No rash and no abscess noted. No erythema. No pallor.         ED Course   Procedures  Labs Reviewed   CBC W/ AUTO DIFFERENTIAL - Abnormal; Notable for the following components:       Result Value    RBC 3.77 (*)     Hemoglobin 9.5 (*)     Hematocrit 31.4 (*)     MCH 25.2 (*)     MCHC 30.3 (*)     RDW 16.9 (*)     All other components within normal limits   COMPREHENSIVE METABOLIC PANEL - Abnormal; Notable for the following components:    Glucose 124 (*)     BUN 59 (*)     Creatinine 3.5 (*)     Albumin 3.1 (*)     Anion Gap 6 (*)     eGFR if  14.6 (*)     eGFR if non  12.7 (*)     All other components within normal limits   TROPONIN I - Abnormal; Notable for the following components:    Troponin I 0.052 (*)     All other components within normal limits   B-TYPE NATRIURETIC PEPTIDE - Abnormal; Notable for the following components:    BNP 1,380 (*)     All other components within normal limits   PROTIME-INR - Abnormal; Notable for the following components:    PT 16.9 (*)     All other components within normal limits   CULTURE, BLOOD   CULTURE, BLOOD   LACTIC ACID, PLASMA   SARS-COV-2 RNA AMPLIFICATION, QUAL   TSH        ECG Results          EKG 12-lead (In process)  Result time 03/17/22 16:34:37    In process by Interface, Lab In [facility] (03/17/22 16:34:37)                 Narrative:    Test Reason :  R06.02,    Vent. Rate : 060 BPM     Atrial Rate : 060 BPM     P-R Int : 160 ms          QRS Dur : 142 ms      QT Int : 454 ms       P-R-T Axes : 025 -31 125 degrees     QTc Int : 454 ms    Normal sinus rhythm  Left axis deviation  Left bundle branch block  Abnormal ECG  When compared with ECG of 07-MAR-2022 10:15,  Premature atrial complexes are no longer Present  Left bundle branch block is now Present    Referred By: [email or web address]   SELF           Confirmed By:                   In process by Interface, Lab In [facility] (03/17/22 16:28:48)                 Narrative:    Test Reason : R06.02,    Vent. Rate : 060 BPM     Atrial Rate : 060 BPM     P-R Int : 160 ms          QRS Dur : 142 ms      QT Int : 454 ms       P-R-T Axes : 025 -31 125 degrees     QTc Int : 454 ms    Normal sinus rhythm  Left axis deviation  Left bundle branch block  Abnormal ECG  When compared with ECG of 07-MAR-2022 10:15,  Premature atrial complexes are no longer Present  Left bundle branch block is now Present    Referred By: [email or web address]   SELF           Confirmed By:                             Imaging Results          X-Ray Chest AP Portable (Final result)  Result time 03/17/22 16:15:07    Final result by Damian Perez MD (03/17/22 16:15:07)                 Narrative:    Reason: shortness of breath    FINDINGS:    PA and lateral chest with comparison chest x-ray March 7, 2022 show unchanged enlarged cardiomediastinal silhouette. Enlarged central hilar vascular structures and perihilar interstitial thickening noted, similar to previous exam.  Pulmonary vasculature is normal. No acute osseous abnormality.    IMPRESSION:    Enlarged cardiomediastinal silhouette with enlarged central hilar vascular structures and perihilar interstitial thickening. Findings are consistent with CHF and mild pulmonary edema.    Electronically signed by:  Damian Perez DO  3/17/2022 4:15 PM CDT Workstation: [id number]                               Medications    ondansetron injection 4 mg (4 mg Intravenous Given 3/17/22 8632)     Medical Decision Making:   History:   Old Medical Records: I decided to obtain old medical records.  Initial Assessment:   Emergent evaluation of a 71-year-old female presenting with edema and shortness of breath differential diagnosis includes CHF exacerbation, electrolyte abnormality, endocrine dysfunction            Attending Attestation:             Attending ED Notes:   Patient found to have acute kidney injury and fluid overload patient will be started on a course of Lasix in consulted Internal Medicine for admission               Clinical Impression:   Final diagnoses:  [R06.02] Shortness of breath  [I50.9] Congestive heart failure, unspecified HF chronicity, unspecified heart failure type (Primary)          ED Disposition Condition    Admit               Jason Stuart MD  03/17/22 [id number]

## 2022-03-18 LAB
ANION GAP SERPL CALC-SCNC: 10 MMOL/L (ref 8–16)
BASOPHILS # BLD AUTO: 0.03 K/UL (ref 0–0.2)
BASOPHILS NFR BLD: 0.4 % (ref 0–1.9)
BUN SERPL-MCNC: 59 MG/DL (ref 8–23)
CALCIUM SERPL-MCNC: 9.7 MG/DL (ref 8.7–10.5)
CHLORIDE SERPL-SCNC: 105 MMOL/L (ref 95–110)
CO2 SERPL-SCNC: 24 MMOL/L (ref 23–29)
CREAT SERPL-MCNC: 3.4 MG/DL (ref 0.5–1.4)
DIFFERENTIAL METHOD: ABNORMAL
EOSINOPHIL # BLD AUTO: 0.4 K/UL (ref 0–0.5)
EOSINOPHIL NFR BLD: 6.2 % (ref 0–8)
ERYTHROCYTE [DISTWIDTH] IN BLOOD BY AUTOMATED COUNT: 17.1 % (ref 11.5–14.5)
EST. GFR  (AFRICAN AMERICAN): 15.1 ML/MIN/1.73 M^2
EST. GFR  (NON AFRICAN AMERICAN): 13.1 ML/MIN/1.73 M^2
GLUCOSE SERPL-MCNC: 116 MG/DL (ref 70–110)
GLUCOSE SERPL-MCNC: 119 MG/DL (ref 70–110)
GLUCOSE SERPL-MCNC: 159 MG/DL (ref 70–110)
GLUCOSE SERPL-MCNC: 169 MG/DL (ref 70–110)
GLUCOSE SERPL-MCNC: 197 MG/DL (ref 70–110)
HCT VFR BLD AUTO: 30.9 % (ref 37–48.5)
HGB BLD-MCNC: 9.3 G/DL (ref 12–16)
IMM GRANULOCYTES # BLD AUTO: 0.02 K/UL (ref 0–0.04)
IMM GRANULOCYTES NFR BLD AUTO: 0.3 % (ref 0–0.5)
LYMPHOCYTES # BLD AUTO: 2.2 K/UL (ref 1–4.8)
LYMPHOCYTES NFR BLD: 32.5 % (ref 18–48)
MCH RBC QN AUTO: 25.5 PG (ref 27–31)
MCHC RBC AUTO-ENTMCNC: 30.1 G/DL (ref 32–36)
MCV RBC AUTO: 85 FL (ref 82–98)
MONOCYTES # BLD AUTO: 0.6 K/UL (ref 0.3–1)
MONOCYTES NFR BLD: 8.3 % (ref 4–15)
NEUTROPHILS # BLD AUTO: 3.6 K/UL (ref 1.8–7.7)
NEUTROPHILS NFR BLD: 52.3 % (ref 38–73)
NRBC BLD-RTO: 0 /100 WBC
PLATELET # BLD AUTO: 331 K/UL (ref 150–450)
PMV BLD AUTO: 9.8 FL (ref 9.2–12.9)
POTASSIUM SERPL-SCNC: 4.2 MMOL/L (ref 3.5–5.1)
RBC # BLD AUTO: 3.65 M/UL (ref 4–5.4)
SODIUM SERPL-SCNC: 139 MMOL/L (ref 136–145)
WBC # BLD AUTO: 6.89 K/UL (ref 3.9–12.7)

## 2022-03-18 PROCEDURE — 21000000 HC CCU ICU ROOM CHARGE

## 2022-03-18 PROCEDURE — 36415 COLL VENOUS BLD VENIPUNCTURE: CPT | Performed by: INTERNAL MEDICINE

## 2022-03-18 PROCEDURE — 25000003 PHARM REV CODE 250: Performed by: INTERNAL MEDICINE

## 2022-03-18 PROCEDURE — 99900031 HC PATIENT EDUCATION (STAT)

## 2022-03-18 PROCEDURE — 85025 COMPLETE CBC W/AUTO DIFF WBC: CPT | Performed by: INTERNAL MEDICINE

## 2022-03-18 PROCEDURE — 94761 N-INVAS EAR/PLS OXIMETRY MLT: CPT

## 2022-03-18 PROCEDURE — 80048 BASIC METABOLIC PNL TOTAL CA: CPT | Performed by: INTERNAL MEDICINE

## 2022-03-18 PROCEDURE — 63600175 PHARM REV CODE 636 W HCPCS: Performed by: INTERNAL MEDICINE

## 2022-03-18 PROCEDURE — 94660 CPAP INITIATION&MGMT: CPT

## 2022-03-18 PROCEDURE — 99900035 HC TECH TIME PER 15 MIN (STAT)

## 2022-03-18 PROCEDURE — 94799 UNLISTED PULMONARY SVC/PX: CPT

## 2022-03-18 PROCEDURE — 63600175 PHARM REV CODE 636 W HCPCS: Performed by: STUDENT IN AN ORGANIZED HEALTH CARE EDUCATION/TRAINING PROGRAM

## 2022-03-18 RX ORDER — ALLOPURINOL 100 MG/1
100 TABLET ORAL DAILY
Status: DISCONTINUED | OUTPATIENT
Start: 2022-03-18 | End: 2022-03-20 | Stop reason: HOSPADM

## 2022-03-18 RX ORDER — HYDROCODONE BITARTRATE AND ACETAMINOPHEN 5; 325 MG/1; MG/1
1 TABLET ORAL EVERY 6 HOURS PRN
Status: DISCONTINUED | OUTPATIENT
Start: 2022-03-18 | End: 2022-03-20 | Stop reason: HOSPADM

## 2022-03-18 RX ADMIN — FUROSEMIDE 40 MG: 10 INJECTION, SOLUTION INTRAMUSCULAR; INTRAVENOUS at 09:03

## 2022-03-18 RX ADMIN — AMIODARONE HYDROCHLORIDE 100 MG: 100 TABLET ORAL at 09:03

## 2022-03-18 RX ADMIN — HYDRALAZINE HYDROCHLORIDE 10 MG: 20 INJECTION INTRAMUSCULAR; INTRAVENOUS at 11:03

## 2022-03-18 RX ADMIN — MONTELUKAST 10 MG: 10 TABLET, FILM COATED ORAL at 09:03

## 2022-03-18 RX ADMIN — ALLOPURINOL 100 MG: 100 TABLET ORAL at 04:03

## 2022-03-18 RX ADMIN — HUMAN INSULIN 2 UNITS: 100 INJECTION, SOLUTION SUBCUTANEOUS at 05:03

## 2022-03-18 RX ADMIN — APIXABAN 5 MG: 5 TABLET, FILM COATED ORAL at 09:03

## 2022-03-18 RX ADMIN — HYDROCODONE BITARTRATE AND ACETAMINOPHEN 1 TABLET: 5; 325 TABLET ORAL at 09:03

## 2022-03-18 RX ADMIN — HYDROCODONE BITARTRATE AND ACETAMINOPHEN 1 TABLET: 5; 325 TABLET ORAL at 06:03

## 2022-03-18 RX ADMIN — HYDRALAZINE HYDROCHLORIDE 50 MG: 25 TABLET ORAL at 05:03

## 2022-03-18 RX ADMIN — LEVOTHYROXINE SODIUM 50 MCG: 0.03 TABLET ORAL at 05:03

## 2022-03-18 RX ADMIN — CARVEDILOL 25 MG: 25 TABLET, FILM COATED ORAL at 09:03

## 2022-03-18 RX ADMIN — AMLODIPINE BESYLATE 5 MG: 5 TABLET ORAL at 09:03

## 2022-03-18 NOTE — PLAN OF CARE
Important Message from Medicare was sign, explained and given to patient/caregiver on 03/18/2022 at 10:16am     addressed any questions or concerns.    Important Message from Medicare document will be scanned into patient's medical record

## 2022-03-18 NOTE — PLAN OF CARE
03/18/22 1406   Patient Assessment/Suction   Level of Consciousness (AVPU) alert   Respiratory Effort Normal;Unlabored   PRE-TX-O2   O2 Device (Oxygen Therapy) room air   SpO2 96 %   Pulse Oximetry Type Continuous   $ Pulse Oximetry - Multiple Charge Pulse Oximetry - Multiple   Pulse 62   Resp (!) 30   Education   $ Education 15 min   Respiratory Evaluation   $ Care Plan Tech Time 15 min

## 2022-03-18 NOTE — CONSULTS
Nephrology Consult Note        Patient Name: Juhi Taylor  MRN: [id number]    Patient Class: IP- Inpatient   Admission Date: 3/17/2022  Length of Stay: 1 days  Date of Service: 3/18/2022    Attending Physician: Terence Sterling MD  Primary Care Provider: Tony Sadler MD    Reason for Consult: poppy/chf exacerbation/ckd3/anemia/htn    SUBJECTIVE:     HPI: [id number] with known history of right nephrectomy on 03/10/2022 at [facility] due to renal cell carcinoma, systolic and diastolic CHF, diabetes, hypertension, obstructive sleep apnea on CPAP, pulmonary hypertension, coronary disease status post stent, CKD 3 followed by Dr. Martinez comes to the ER with SOB and weight gain. Since the surgery, Lasix was placed on hold. She has gained about 18 lb, has been having low p.o. intake, shortness of breath was worsening. Renal labs are also worsening and her BNP is 1,380.    Past Medical History:   Diagnosis Date    Anticoagulant long-term use     Arthritis     Breast cancer 2014    invasive lobular carcinoma    Cancer of kidney 11/2020    RIGHT KIDNEY CANCER    CHF (congestive heart failure)     Coronary artery disease dx 2005    Depression     Diabetes mellitus     Diastolic heart failure secondary to hypertension     Gout     Hyperlipemia     Hypertension     Hypertrophy of nasal turbinates     Kidney mass 2020    Right    Levoscoliosis     Lung nodule     left    Multiple thyroid nodules     NICOLE (obstructive sleep apnea)     uses C-PAP    Pulmonary hypertension      Past Surgical History:   Procedure Laterality Date    AORTOGRAPHY N/A 12/04/2020    Procedure: Aortogram;  Surgeon: Paul Pedersen MD;  Location: [facility];  Service: Cardiology;  Laterality: N/A;    CARDIAC CATHETERIZATION  12/2020    CHOLECYSTECTOMY      COLONOSCOPY      multi -last 2014     ESOPHAGOGASTRODUODENOSCOPY      2012     HAND SURGERY Right     LAPAROSCOPIC ROBOT-ASSISTED SURGICAL REMOVAL OF KIDNEY USING DA CHELLE XI  "Right 3/10/2022    Procedure: XI ROBOTIC NEPHRECTOMY- radical;  Surgeon: Rolando Ramirez MD;  Location: [facility] OR;  Service: Urology;  Laterality: Right;    MASTECTOMY W/ SENTINEL NODE BIOPSY Bilateral 2015    bilateral "dog ears"    NASAL SINUS SURGERY      Dr Bryant FESS/cauterization turbinate     PARTIAL HYSTERECTOMY      PERCUTANEOUS TRANSLUMINAL BALLOON ANGIOPLASTY OF CORONARY ARTERY  2020    Procedure: Angioplasty-coronary;  Surgeon: Paul Pedersen MD;  Location: [facility] CATH;  Service: Cardiology;;    RENAL BIOPSY Right     2021 EJ    TUBAL LIGATION      ULTRASOUND GUIDANCE  2020    Procedure: Ultrasound Guidance;  Surgeon: Paul Pedersen MD;  Location: [facility] CATH;  Service: Cardiology;;     Family History   Problem Relation Age of Onset    Breast cancer Mother     Stroke Father     Hypertension Father     Hepatitis Brother     Asthma Daughter     Birth defects Daughter         Nell's two children has cleft lips    Depression Daughter     Drug abuse Daughter     Learning disabilities Daughter     Mental illness Daughter     Breast cancer Maternal Aunt     Glaucoma Sister     Drug abuse Daughter     Macular degeneration Neg Hx     Retinal detachment Neg Hx      Social History     Tobacco Use    Smoking status: Former Smoker     Packs/day: 1.00     Years: 0.00     Pack years: 0.00     Types: Cigarettes     Quit date: 2016     Years since quittin.2    Smokeless tobacco: Never Used    Tobacco comment: quit    Substance Use Topics    Alcohol use: No    Drug use: No       Review of patient's allergies indicates:   Allergen Reactions    Percocet [oxycodone-acetaminophen] Itching    Irbesartan Swelling    Januvia [sitagliptin]     Jardiance [empagliflozin]      Leg cramps    Lipitor [atorvastatin] Other (See Comments)     Severe leg pain    Linaclotide Other (See Comments) and Nausea And Vomiting     Does not remember    Lubiprostone " Other (See Comments) and Palpitations     Does not remember       Outpatient meds:  No current facility-administered medications on file prior to encounter.     Current Outpatient Medications on File Prior to Encounter   Medication Sig Dispense Refill    albuterol (PROVENTIL/VENTOLIN HFA) 90 mcg/actuation inhaler Inhale 1-2 puffs into the lungs every 6 (six) hours as needed for Wheezing or Shortness of Breath. Rescue 6.7 g 0    allopurinoL (ZYLOPRIM) 100 MG tablet Take 1 tablet (100 mg total) by mouth once daily. (Patient taking differently: Take 300 mg by mouth once daily.)      amiodarone (PACERONE) 100 MG Tab Take 1 tablet (100 mg total) by mouth once daily. 90 tablet 0    amLODIPine (NORVASC) 5 MG tablet Take 2 tablets (10 mg total) by mouth nightly. 30 tablet 2    apixaban (ELIQUIS) 5 mg Tab Take 1 tablet (5 mg total) by mouth 2 (two) times daily.      carvediloL (COREG) 25 MG tablet TAKE ONE TABLET BY MOUTH TWICE DAILY (Patient taking differently: Take 25 mg by mouth 2 (two) times daily.) 180 tablet 2    docusate sodium (COLACE) 100 MG capsule Take 1 capsule (100 mg total) by mouth once daily.  0    hydrALAZINE (APRESOLINE) 50 MG tablet Take 3 tablets (150 mg total) by mouth every 12 (twelve) hours. (Patient taking differently: Take 50 mg by mouth 3 (three) times daily.) 60 tablet 3    levothyroxine (SYNTHROID) 50 MCG tablet TAKE ONE TABLET BY MOUTH EVERY MORNING ON an empty stomach (Patient taking differently: Take 50 mcg by mouth before breakfast.) 90 tablet 1    montelukast (SINGULAIR) 10 mg tablet TAKE ONE TABLET BY MOUTH EVERY EVENING FOR allergies (Patient taking differently: Take 10 mg by mouth every evening.) 90 tablet 3    sodium bicarbonate 650 MG tablet Take 1 tablet (650 mg total) by mouth once daily. 30 tablet 11    bisacodyL (DULCOLAX) 10 mg Supp Place 1 suppository (10 mg total) rectally 2 (two) times daily as needed (constipation). 28 suppository 3    blood sugar diagnostic Strp To  check BG2 times daily,and PRN hypoglycemia  to use with insurance preferred meter Diagnosis Code: E11.65 100 each 1    dextromethorphan-guaiFENesin (MUCINEX DM) 60-1,200 mg per 12 hr tablet Take 1 tablet by mouth 2 (two) times daily as needed (chest congestion). 30 tablet 2    ENTRESTO 49-51 mg per tablet Take 1 tablet by mouth 2 (two) times daily.      HYDROcodone-acetaminophen (NORCO)  mg per tablet Take 1 tablet by mouth every 6 (six) hours as needed for Pain. 20 tablet 0    lancets Misc To check BG 2 times daily,and PRN hypoglycemia to use with insurance preferred meter Diagnosis Code: E11.65 100 each 1    nitroGLYCERIN (NITROSTAT) 0.4 MG SL tablet Place 1 tablet (0.4 mg total) under the tongue every 5 (five) minutes as needed for Chest pain. 20 tablet 0    polyethylene glycol (GLYCOLAX) 17 gram/dose powder Take 17 g by mouth 2 (two) times daily. 765 g 0    [DISCONTINUED] aspirin (ECOTRIN) 81 MG EC tablet Take 1 tablet (81 mg total) by mouth once daily. 90 tablet 3    [DISCONTINUED] DULoxetine (CYMBALTA) 20 MG capsule TAKE ONE CAPSULE BY MOUTH ONCE DAILY 30 capsule 4    [DISCONTINUED] folic acid (FOLVITE) 1 MG tablet Take 1 mg by mouth once daily.      [DISCONTINUED] furosemide (LASIX) 40 MG tablet Take 1 tablet (40 mg total) by mouth once daily. 90 tablet 0    [DISCONTINUED] metFORMIN (GLUCOPHAGE-XR) 500 MG ER 24hr tablet Take 2 tablets (1,000 mg total) by mouth 2 (two) times daily with meals. 360 tablet 3    [DISCONTINUED] PRALUENT PEN 75 mg/mL PnIj Inject 1 mL (75 mg total) into the skin every 14 (fourteen) days. 6 Syringe 3       Scheduled meds:   amiodarone  100 mg Oral Daily    amLODIPine  5 mg Oral Daily    apixaban  5 mg Oral BID    carvediloL  25 mg Oral BID    furosemide (LASIX) injection  40 mg Intravenous Daily    hydrALAZINE  50 mg Oral Q8H    levothyroxine  50 mcg Oral Before breakfast    montelukast  10 mg Oral Daily    sodium bicarbonate  650 mg Oral Once        Infusions:      PRN meds:  dextrose 50%, dextrose 50%, hydrALAZINE, HYDROcodone-acetaminophen, insulin regular, sodium chloride 0.9%    Review of Systems:  Review of Systems   Constitutional: Positive for malaise/fatigue. Negative for chills, fever and weight loss.   HENT: Negative for hearing loss and nosebleeds.    Eyes: Negative for blurred vision, double vision and photophobia.   Respiratory: Positive for shortness of breath. Negative for cough and wheezing.    Cardiovascular: Positive for leg swelling. Negative for chest pain and palpitations.   Gastrointestinal: Positive for nausea. Negative for abdominal pain, constipation, diarrhea, heartburn and vomiting.   Genitourinary: Negative for dysuria, frequency and urgency.   Musculoskeletal: Negative for falls, joint pain and myalgias.   Skin: Negative for itching and rash.   Neurological: Positive for weakness. Negative for dizziness, speech change, focal weakness, loss of consciousness and headaches.   Endo/Heme/Allergies: Does not bruise/bleed easily.   Psychiatric/Behavioral: Negative for depression and substance abuse. The patient is not nervous/anxious.      OBJECTIVE:     Vital Signs and IO (Last 24H):  Temp:  [96.8 °F (36 °C)-98.2 °F (36.8 °C)]   Pulse:  [54-65]   Resp:  [9-38]   BP: (116-191)/(61-88)   SpO2:  [95 %-98 %]   No intake/output data recorded.    Wt Readings from Last 5 Encounters:   03/17/22 135.4 kg (298 lb 8.1 oz)   03/10/22 130.5 kg (287 lb 11.2 oz)   03/08/22 130.4 kg (287 lb 7.7 oz)   03/07/22 130.5 kg (287 lb 11.2 oz)   02/23/22 130.6 kg (287 lb 14.7 oz)     Physical Exam:  Physical Exam  Constitutional:       General: She is not in acute distress.     Appearance: She is well-developed. She is not diaphoretic.   HENT:      Head: Normocephalic and atraumatic.      Mouth/Throat:      Mouth: Mucous membranes are moist.   Eyes:      General: No scleral icterus.     Pupils: Pupils are equal, round, and reactive to light.    Cardiovascular:      Rate and Rhythm: Normal rate and regular rhythm.   Pulmonary:      Effort: Pulmonary effort is normal. No respiratory distress.      Breath sounds: No stridor.   Abdominal:      General: There is no distension.      Palpations: Abdomen is soft.   Musculoskeletal:         General: Swelling present. No deformity. Normal range of motion.      Cervical back: Neck supple.   Skin:     General: Skin is warm and dry.      Findings: No erythema or rash.   Neurological:      Mental Status: She is alert and oriented to person, place, and time.      Cranial Nerves: No cranial nerve deficit.   Psychiatric:         Behavior: Behavior normal.       Body mass index is 46.75 kg/m².    Laboratory:  Recent Labs   Lab 03/13/22  0549 03/17/22  1647 03/18/22  1146    138 139   K 3.9 4.1 4.2    105 105   CO2 23 27 24   BUN 43* 59* 59*   CREATININE 2.98* 3.5* 3.4*   ESTGFRAFRICA 18* 14.6* 15.1*   EGFRNONAA 15* 12.7* 13.1*   * 124* 159*       Recent Labs   Lab 03/13/22  0549 03/17/22  1647 03/18/22  1146   CALCIUM 9.5 9.6 9.7   ALBUMIN 3.2* 3.1*  --    PHOS 4.6*  --   --    MG  --  2.1  --        Recent Labs   Lab 08/18/20  1326 04/14/21  1014 12/30/21  1113   PTH, Intact 180.0 H 197.0 H 164.1 H       Recent Labs   Lab 03/11/22  1636 03/11/22  2023 03/12/22  0705 03/12/22  1111 03/12/22  1623 03/12/22  2113 03/13/22  0716   POCTGLUCOSE 138* 152* 125* 162* 157* 150* 128*       Recent Labs   Lab 08/16/21  1108 11/19/21  1450 02/16/22  1038   Hemoglobin A1C 6.3 H 7.0 H 5.5       Recent Labs   Lab 03/12/22  0527 03/17/22  1647 03/18/22  1146   WBC 8.62 8.25 6.89   HGB 9.9* 9.5* 9.3*   HCT 33.1* 31.4* 30.9*    318 331   MCV 85 83 85   MCHC 29.9* 30.3* 30.1*   MONO 9.7  0.8 7.9  0.7 8.3  0.6       Recent Labs   Lab 03/13/22  0549 03/17/22  1647   BILITOT  --  0.5   PROT  --  6.4   ALBUMIN 3.2* 3.1*   ALKPHOS  --  106   ALT  --  18   AST  --  22       Recent Labs   Lab 10/18/20  6832  11/16/20  1625 04/14/21  0952 12/30/21  1135 03/17/22  2240   Color, UA Yellow Yellow Yellow  --  Colorless A   Appearance, UA Clear Clear Clear  --  Clear   pH, UA 7.0 5.0 5.0  --  5.0   Specific Gravity, UA 1.020 1.015 1.020  --  1.010   Protein, UA 3+ A 3+ A 2+ A  --  Negative   Glucose, UA Negative 1+ A Negative  --  Negative   Ketones, UA Negative Negative Negative  --  Negative   Urobilinogen, UA Negative  --   --   --  Negative   Bilirubin (UA) Negative Negative Negative  --  Negative   Occult Blood UA Trace A Negative Negative  --  Negative   Nitrite, UA Negative Negative Negative  --  Negative   RBC, UA 3 1 0 1  --    WBC, UA 1 1 2 1  --    Bacteria None Moderate A Few A Rare  --    Hyaline Casts, UA 1 1 0  --   --              Microbiology Results (last 7 days)     Procedure Component Value Units Date/Time    Blood culture #1 **CANNOT BE ORDERED STAT** [[id number]] Collected: 03/17/22 1811    Order Status: Completed Specimen: Blood from Peripheral, Antecubital, Right Updated: 03/18/22 0317     Blood Culture, Routine No Growth to date    Blood culture #2 **CANNOT BE ORDERED STAT** [[id number]] Collected: 03/17/22 1811    Order Status: Completed Specimen: Blood from Peripheral, Ankle, Right Updated: 03/18/22 0317     Blood Culture, Routine No Growth to date          ASSESSMENT/PLAN:     PEACE  CKD stage 3  CHF exacerbation due to holding diuretics  S/p nephrectomy for RCC on 3/10  Gout  No NSAIDs, ACEI/ARB, IV contrast or other nephrotoxins.  Keep MAP > 60, SBP > 100.  Dose meds for GFR < 30 ml/min.  Renal diet - low K, low phos.  Agree with IV diuretics.  Resume allopurinol at 100mg /day.    Anemia of CKD  Hgb and HCT are acceptable. Monitor.  Will provide PONCE and/or IV iron PRN.    MBD / Secondary HPT  Ca, phos, PTH and vitamin D levels are acceptable.   Phos binders, vitamin D analogues and calcimimetics as needed.    HTN  BP seem controlled but low since started diuretics.   Tolerate asymptomatic HTN up to  -160.  Hold Amlodipine and Hydralazine, give diuretic.  Low sodium diet.  Cards consult ? Resume Entresto.    Thank you for allowing us to participate in the care of your patient!   We will follow the patient and provide recommendations as needed.    Patient care time was spent personally by me on the following activities:   · Obtaining a history.  · Examination of patient.  · Providing medical care at the patients bedside.  · Developing a treatment plan with patient or surrogate and bedside caregivers.  · Ordering and reviewing laboratory studies, radiographic studies, pulse oximetry.  · Ordering and performing treatments and interventions.  · Evaluation of patient's response to treatment.  · Discussions with consultants while on the unit and immediately available to the patient.  · Re-evaluation of the patient's condition.  · Documentation in the medical record.     Paolo Martinez MD    [facility]  [address] [place] [id number]    [phone] - tel  [phone] - fax    3/18/2022

## 2022-03-18 NOTE — PROGRESS NOTES
[facility]  Progress Note    Patient Name: Juhi Taylor  MRN: [id number]  Patient Class: IP- Inpatient   Admission Date: 3/17/2022  Length of Stay: 1 days  Attending Physician: Terence Sterling MD  Primary Care Provider: Tony Sadler MD        Subjective:     Principal Problem:<principal problem not specified>        HPI:  71-year-old woman presents with swelling weight gain, and dyspnea on exertion after not taking several of her heart failure medications and cardiac medications after a recent surgical procedure.      Overview/Hospital Course:  No notes on file    Interval History:  Patient still has some dyspnea on exertion.  She denies orthopnea, nausea, vomiting, palpitations, or chest pain.    Review of Systems   All other systems reviewed and are negative.  Objective:     Vital Signs (Most Recent):  Temp: 98.2 °F (36.8 °C) (03/18/22 0742)  Pulse: (!) 57 (03/18/22 1539)  Resp: 20 (03/18/22 1539)  BP: (!) 167/73 (03/18/22 1539)  SpO2: 96 % (03/18/22 1539)   Vital Signs (24h Range):  Temp:  [96.8 °F (36 °C)-98.2 °F (36.8 °C)] 98.2 °F (36.8 °C)  Pulse:  [54-65] 57  Resp:  [9-38] 20  SpO2:  [95 %-98 %] 96 %  BP: (116-191)/(61-85) 167/73     Weight: 135.4 kg (298 lb 8.1 oz)  Body mass index is 46.75 kg/m².  No intake or output data in the 24 hours ending 03/18/22 1750   Physical Exam  Vitals reviewed.   Constitutional:       General: She is not in acute distress.  HENT:      Head: Normocephalic.   Cardiovascular:      Rate and Rhythm: Normal rate and regular rhythm.      Pulses: Normal pulses.   Pulmonary:      Effort: Pulmonary effort is normal.      Comments: Bibasilar crackles.  Abdominal:      General: There is no distension.      Tenderness: There is no abdominal tenderness.   Skin:     General: Skin is warm.   Neurological:      General: No focal deficit present.      Mental Status: She is alert and oriented to person, place, and time.   Psychiatric:          Mood and Affect: Mood normal.       Significant Labs: All pertinent labs within the past 24 hours have been reviewed.  CBC:   Recent Labs   Lab 03/17/22  1647 03/18/22  1146   WBC 8.25 6.89   HGB 9.5* 9.3*   HCT 31.4* 30.9*    331     CMP:   Recent Labs   Lab 03/17/22  1647 03/18/22  1146    139   K 4.1 4.2    105   CO2 27 24   * 159*   BUN 59* 59*   CREATININE 3.5* 3.4*   CALCIUM 9.6 9.7   PROT 6.4  --    ALBUMIN 3.1*  --    BILITOT 0.5  --    ALKPHOS 106  --    AST 22  --    ALT 18  --    ANIONGAP 6* 10   EGFRNONAA 12.7* 13.1*       Significant Imaging: I have reviewed all pertinent imaging results/findings within the past 24 hours.      Assessment/Plan:      Paroxysmal atrial fibrillation  Continue amiodarone  Continue apixaban      Acute on chronic congestive heart failure  Patient is identified as having Combined Systolic and Diastolic heart failure that is Acute on chronic. Latest ECHO performed and demonstrates- Results for orders placed during the hospital encounter of 11/19/21    Echo    Interpretation Summary  · The estimated ejection fraction is 35-40%.  · The left ventricle is moderately enlarged with moderate concentric hypertrophy  · Severe left atrial enlargement.  · Normal right ventricular size with normal right ventricular systolic function.  · Mild-to-moderate mitral regurgitation.  · Mild tricuspid regurgitation.  · The estimated PA systolic pressure is 44 mmHg.  · There is pulmonary hypertension.  Monitor on telemetry. Patient is off CHF pathway.  Monitor strict Is&Os and daily weights.  Place on fluid restriction of 1.5 L. Continue to stress to patient importance of self efficacy and  on diet for CHF. Last BNP reviewed- and noted below   Recent Labs   Lab 03/17/22  1647   BNP 1,380*      Continue Coreg  Continue Lasix IV daily        Diabetes mellitus  Insulin sliding scale as needed        VTE Risk Mitigation (From admission, onward)         Ordered      apixaban tablet 5 mg  2 times daily         03/17/22 1938     IP VTE HIGH RISK PATIENT  Once         03/17/22 1933     Place sequential compression device  Until discontinued         03/17/22 1933                Discharge Planning   HARINI: 3/20/2022     Code Status: Full Code   Is the patient medically ready for discharge?:     Reason for patient still in hospital (select all that apply): Treatment and Consult recommendations  Discharge Plan A: Home with family                  Terence Sterling MD  Department of Hospital Medicine   [facility]

## 2022-03-18 NOTE — ASSESSMENT & PLAN NOTE
Patient is identified as having Combined Systolic and Diastolic heart failure that is Acute on chronic. Latest ECHO performed and demonstrates- Results for orders placed during the hospital encounter of 11/19/21    Echo    Interpretation Summary  · The estimated ejection fraction is 35-40%.  · The left ventricle is moderately enlarged with moderate concentric hypertrophy  · Severe left atrial enlargement.  · Normal right ventricular size with normal right ventricular systolic function.  · Mild-to-moderate mitral regurgitation.  · Mild tricuspid regurgitation.  · The estimated PA systolic pressure is 44 mmHg.  · There is pulmonary hypertension.  Monitor on telemetry. Patient is off CHF pathway.  Monitor strict Is&Os and daily weights.  Place on fluid restriction of 1.5 L. Continue to stress to patient importance of self efficacy and  on diet for CHF. Last BNP reviewed- and noted below   Recent Labs   Lab 03/17/22  1647   BNP 1,380*      Continue Coreg  Continue Lasix IV daily

## 2022-03-18 NOTE — PLAN OF CARE
Problem: Adult Inpatient Plan of Care  Goal: Plan of Care Review  Outcome: Ongoing, Progressing     Problem: Diabetes Comorbidity  Goal: Blood Glucose Level Within Targeted Range  Outcome: Ongoing, Progressing

## 2022-03-18 NOTE — H&P
[facility] History & Physical Examination   Patient Name: Juhi Taylor  MRN: [id number]  Patient Class: IP- Inpatient   Admission Date: 3/17/2022  3:28 PM  Length of Stay: 0  Attending Physician: Dean Glass MD  Primary Care Provider: Tony Sadler MD  Face-to-Face encounter date: 03/17/2022  Code Status: Full Code  MPOA:  Chief Complaint: Shortness of Breath        HISTORY OF PRESENT ILLNESS:   Juhi Taylor is a 71 y.o. Black or  female with known history of right nephrectomy on 03/10/2022 due to renal cell carcinoma systolic and diastolic CHF diabetes hypertension dyslipidemia obstructive sleep apnea on CPAP at home pulmonary hypertension coronary disease status post stent CKD she comes to the ER today because she says ever since the surgery on her Lasix was placed on hold along with her Entresto and she states she has gained about 18 lb since then and she also has been having low p.o. intake shortness of breath so she decided come to the ER washer RTI her chest x-ray does show signs of congestion her nephrologist. Dr. Martinez, was consulted from ER staff and recommended to start patient on course of Lasix.  Patient be admitted to med tele. BNP 1380.      REVIEW OF SYSTEMS:   10 Point Review of System was performed and was found to be negative except for that mentioned already in the HPI above.   She states she does nottake opioids prescribved to her post due to constipation, as been treating er pain with Acetaminophen only.    PAST MEDICAL HISTORY:     Past Medical History:   Diagnosis Date    Anticoagulant long-term use     Arthritis     Breast cancer 2014    invasive lobular carcinoma    Cancer of kidney 11/2020    RIGHT KIDNEY CANCER    CHF (congestive heart failure)     Coronary artery disease dx 2005    Depression     Diabetes mellitus     Diastolic heart failure secondary to hypertension     Gout     Hyperlipemia      "Hypertension     Hypertrophy of nasal turbinates     Kidney mass 2020    Right    Levoscoliosis     Lung nodule     left    Multiple thyroid nodules     NICOLE (obstructive sleep apnea)     uses C-PAP    Pulmonary hypertension        PAST SURGICAL HISTORY:     Past Surgical History:   Procedure Laterality Date    AORTOGRAPHY N/A 12/04/2020    Procedure: Aortogram;  Surgeon: Paul Pedersen MD;  Location: [facility] CATH;  Service: Cardiology;  Laterality: N/A;    CARDIAC CATHETERIZATION  12/2020    CHOLECYSTECTOMY      COLONOSCOPY      multi -last 2014     ESOPHAGOGASTRODUODENOSCOPY      2012     HAND SURGERY Right     LAPAROSCOPIC ROBOT-ASSISTED SURGICAL REMOVAL OF KIDNEY USING DA CHELLE XI Right 3/10/2022    Procedure: XI ROBOTIC NEPHRECTOMY- radical;  Surgeon: Rolando Ramirez MD;  [facility];  Service: Urology;  Laterality: Right;    MASTECTOMY W/ SENTINEL NODE BIOPSY Bilateral 01/21/2015    bilateral "dog ears"    NASAL SINUS SURGERY  2015    Dr Bryant FESS/cauterization turbinate     PARTIAL HYSTERECTOMY  1989    PERCUTANEOUS TRANSLUMINAL BALLOON ANGIOPLASTY OF CORONARY ARTERY  12/04/2020    Procedure: Angioplasty-coronary;  Surgeon: Paul Pedersen MD;  Location: [facility] CATH;  Service: Cardiology;;    RENAL BIOPSY Right     9/20/2021 EJ    TUBAL LIGATION      ULTRASOUND GUIDANCE  12/04/2020    Procedure: Ultrasound Guidance;  Surgeon: Paul Pedersen MD;  Location: [facility] CATH;  Service: Cardiology;;       ALLERGIES:   Percocet [oxycodone-acetaminophen], Irbesartan, Januvia [sitagliptin], Jardiance [empagliflozin], Lipitor [atorvastatin], Linaclotide, and Lubiprostone    FAMILY HISTORY:     Family History   Problem Relation Age of Onset    Breast cancer Mother     Stroke Father     Hypertension Father     Hepatitis Brother     Asthma Daughter     Birth defects Daughter         Nell's two children has cleft lips    Depression Daughter     Drug abuse Daughter     Learning disabilities " Daughter     Mental illness Daughter     Breast cancer Maternal Aunt     Glaucoma Sister     Drug abuse Daughter     Macular degeneration Neg Hx     Retinal detachment Neg Hx        SOCIAL HISTORY:     Social History     Tobacco Use    Smoking status: Former Smoker     Packs/day: 1.00     Years: 0.00     Pack years: 0.00     Types: Cigarettes     Quit date: 2016     Years since quittin.2    Smokeless tobacco: Never Used    Tobacco comment: quit    Substance Use Topics    Alcohol use: No        Social History     Substance and Sexual Activity   Sexual Activity Not Currently    Partners: Male        HOME MEDICATIONS:     Prior to Admission medications    Medication Sig Start Date End Date Taking? Authorizing Provider   albuterol (PROVENTIL/VENTOLIN HFA) 90 mcg/actuation inhaler Inhale 1-2 puffs into the lungs every 6 (six) hours as needed for Wheezing or Shortness of Breath. Rescue 22 Yes Maynor Perez MD   allopurinoL (ZYLOPRIM) 100 MG tablet Take 1 tablet (100 mg total) by mouth once daily.  Patient taking differently: Take 300 mg by mouth once daily. 3/13/22  Yes Rolando Ramirez MD   amiodarone (PACERONE) 100 MG Tab Take 1 tablet (100 mg total) by mouth once daily. 3/12/22 3/12/23 Yes Libby Johnson MD   amLODIPine (NORVASC) 5 MG tablet Take 2 tablets (10 mg total) by mouth nightly. 3/13/22 3/13/23 Yes Rolando Ramirez MD   apixaban (ELIQUIS) 5 mg Tab Take 1 tablet (5 mg total) by mouth 2 (two) times daily. 3/14/22  Yes Rolando Ramirez MD   carvediloL (COREG) 25 MG tablet TAKE ONE TABLET BY MOUTH TWICE DAILY  Patient taking differently: Take 25 mg by mouth 2 (two) times daily. 10/4/21  Yes Tony Sadler MD   docusate sodium (COLACE) 100 MG capsule Take 1 capsule (100 mg total) by mouth once daily. 3/14/22  Yes Rolando Ramirez MD   hydrALAZINE (APRESOLINE) 50 MG tablet Take 3 tablets (150 mg total) by mouth every 12 (twelve) hours.  Patient taking  differently: Take 50 mg by mouth 3 (three) times daily. 3/13/22 3/13/23 Yes Rolando Ramirez MD   levothyroxine (SYNTHROID) 50 MCG tablet TAKE ONE TABLET BY MOUTH EVERY MORNING ON an empty stomach  Patient taking differently: Take 50 mcg by mouth before breakfast. 10/19/21  Yes Tony Sadler MD   montelukast (SINGULAIR) 10 mg tablet TAKE ONE TABLET BY MOUTH EVERY EVENING FOR allergies  Patient taking differently: Take 10 mg by mouth every evening. 8/17/21  Yes Tony Sadler MD   sodium bicarbonate 650 MG tablet Take 1 tablet (650 mg total) by mouth once daily. 3/14/22 3/14/23 Yes Rolando Ramirez MD   bisacodyL (DULCOLAX) 10 mg Supp Place 1 suppository (10 mg total) rectally 2 (two) times daily as needed (constipation). 3/15/22   Ernestina Hazel NP   blood sugar diagnostic Strp To check BG2 times daily,and PRN hypoglycemia  to use with insurance preferred meter Diagnosis Code: E11.65 12/2/20   SAAD Gill MD   dextromethorphan-guaiFENesin (MUCINEX DM) 60-1,200 mg per 12 hr tablet Take 1 tablet by mouth 2 (two) times daily as needed (chest congestion). 3/15/22 3/25/22  Ernestina Hazel NP   ENTRESTO 49-51 mg per tablet Take 1 tablet by mouth 2 (two) times daily. 12/29/21   Historical Provider   HYDROcodone-acetaminophen (NORCO)  mg per tablet Take 1 tablet by mouth every 6 (six) hours as needed for Pain. 3/15/22   Ernestina Hazel NP   lancets Misc To check BG 2 times daily,and PRN hypoglycemia to use with insurance preferred meter Diagnosis Code: E11.65 12/2/20   SAAD Gill MD   nitroGLYCERIN (NITROSTAT) 0.4 MG SL tablet Place 1 tablet (0.4 mg total) under the tongue every 5 (five) minutes as needed for Chest pain. 12/5/20 12/7/21  Kendall Guillen MD   polyethylene glycol (GLYCOLAX) 17 gram/dose powder Take 17 g by mouth 2 (two) times daily. 3/15/22   Ernestina Hazel NP   aspirin (ECOTRIN) 81 MG EC tablet Take 1 tablet (81 mg total) by mouth  once daily. 12/10/21 3/13/22  Libby Johnson MD   DULoxetine (CYMBALTA) 20 MG capsule TAKE ONE CAPSULE BY MOUTH ONCE DAILY 2/14/22 3/13/22  Tony Sadler MD   folic acid (FOLVITE) 1 MG tablet Take 1 mg by mouth once daily.  3/13/22  Historical Provider   furosemide (LASIX) 40 MG tablet Take 1 tablet (40 mg total) by mouth once daily. 3/6/22 3/13/22  Libby Johnson MD   metFORMIN (GLUCOPHAGE-XR) 500 MG ER 24hr tablet Take 2 tablets (1,000 mg total) by mouth 2 (two) times daily with meals. 11/16/21 3/13/22  Eldon Peña MD   PRALUENT PEN 75 mg/mL PnIj Inject 1 mL (75 mg total) into the skin every 14 (fourteen) days. 9/16/21 3/13/22  Eldon Peña MD         PHYSICAL EXAM:   BP (!) 191/85   Pulse 60   Temp 98.2 °F (36.8 °C) (Oral)   Resp (!) 9   Wt (!) 137.9 kg (304 lb)   SpO2 95%   BMI 48.33 kg/m²   Vitals Reviewed  General appearance:female in some distress due to pain at site of recent surgery.  Skin: No Rash.   Neuro: Motor and sensory exams grossly intact. Good tone. Power in all 4 extremities 5/5.   HENT: Atraumatic head. Moist mucous membranes of oral cavity.  Eyes: Normal extraocular movements.   Neck: Supple. No evidence of lymphadenopathy. No thyroidomegaly.  Lungs: Clear to auscultation bilaterally. No wheezing present.   Heart: Regular rate and rhythm. S1 and S2 present with no murmurs/gallop/rub. No pedal edema. No JVD present.   Abdomen: Soft, non-distended, tenderness to palpation on lower right abdomen, no sings of peritonitis, post op wounds with no signs of infeciton or active bleeding. No rebound tenderness/guarding. No masses or organomegaly. Bowel sounds are normal. Bladder is not palpable.   Extremities: No cyanosis, clubbing.  Psych/mental status: Alert and oriented. Cooperative. Responds appropriately to questions.   EMERGENCY DEPARTMENT LABS AND IMAGING:     Labs Reviewed   CBC W/ AUTO DIFFERENTIAL - Abnormal; Notable for the following components:       Result Value     RBC 3.77 (*)     Hemoglobin 9.5 (*)     Hematocrit 31.4 (*)     MCH 25.2 (*)     MCHC 30.3 (*)     RDW 16.9 (*)     All other components within normal limits   COMPREHENSIVE METABOLIC PANEL - Abnormal; Notable for the following components:    Glucose 124 (*)     BUN 59 (*)     Creatinine 3.5 (*)     Albumin 3.1 (*)     Anion Gap 6 (*)     eGFR if  14.6 (*)     eGFR if non  12.7 (*)     All other components within normal limits   TROPONIN I - Abnormal; Notable for the following components:    Troponin I 0.052 (*)     All other components within normal limits   B-TYPE NATRIURETIC PEPTIDE - Abnormal; Notable for the following components:    BNP 1,380 (*)     All other components within normal limits   PROTIME-INR - Abnormal; Notable for the following components:    PT 16.9 (*)     All other components within normal limits   CULTURE, BLOOD   CULTURE, BLOOD   SARS-COV-2 RNA AMPLIFICATION, QUAL   LACTIC ACID, PLASMA   MAGNESIUM   TSH   URINALYSIS, REFLEX TO URINE CULTURE       X-Ray Chest AP Portable   Final Result          ASSESSMENT & PLAN:   Juhi Taylor is a 71 y.o. female admitted for    CHF exacerbation  Elevated creatinine likely due to PEACE on CKD  Mixed CHF, EF 30%  DM  HTN  Dyslipidemia  NICOLE on CPOAP  Pulm HTN  CAD s/p stent.  NOrmocitic anemia      Plan  Admit patient to cardiology unit  Lasix IV 40 mg a day 1st dose now  Continue to follow-up nephrology recommendations input appreciated  Hydralazine IV 10 mg q.4 hours p.r.n. for SBP more than 160.  Continue with home medications          Diet: Diabetic, renal and cardiac.    DVT Prophylaxis:Patient on Eliquisand Encourage ambulation. OOB as tolerated.     Discharge Planning and Disposition:  Home independent    Expected LOS: 2 midnights    This patient is high risk for life-threatening deterioration and death secondary to above comorbidities and need for IV treatment. This patient meets inpatient criteria.    ________________________________________________________________________________    Face-to-Face encounter date: 03/17/2022  Encounter included review of the medical records, interviewing and examining the patient face-to-face, discussion with family and other health care providers including emergency medicine physician, admission orders, interpreting lab/test results and formulating a plan of care.   Medical Decision Making during this encounter was High Complexity due to Patient has a condition that poses threat to life.  ________________________________________________________________________________    INPATIENT LIST OF MEDICATIONS     Current Facility-Administered Medications:     acetaminophen tablet 1,000 mg, 1,000 mg, Oral, Once, Dean Glass MD    [START ON 3/18/2022] amiodarone tablet 100 mg, 100 mg, Oral, Daily, Dean Glass MD    amLODIPine tablet 5 mg, 5 mg, Oral, Daily, Dean Glass MD    [START ON 3/18/2022] apixaban tablet 5 mg, 5 mg, Oral, BID, Dean Glass MD    carvediloL tablet 25 mg, 25 mg, Oral, BID, Dean Glass MD    furosemide injection 40 mg, 40 mg, Intravenous, Daily, Dean Glass MD    hydrALAZINE injection 10 mg, 10 mg, Intravenous, Q4H PRN, Dean Glass MD    hydrALAZINE tablet 50 mg, 50 mg, Oral, Q8H, Dean Glass MD    [START ON 3/18/2022] levothyroxine tablet 50 mcg, 50 mcg, Oral, Before breakfast, Dean Glass MD    [START ON 3/18/2022] montelukast tablet 10 mg, 10 mg, Oral, Daily, Dean Glass MD    sodium bicarbonate tablet 650 mg, 650 mg, Oral, Once, Dean Glass MD    sodium chloride 0.9% flush 2 mL, 2 mL, Intravenous, Q6H PRN, Dean Glass MD    Current Outpatient Medications:     albuterol (PROVENTIL/VENTOLIN HFA) 90 mcg/actuation inhaler, Inhale 1-2 puffs into the lungs every 6 (six) hours as needed for Wheezing or Shortness of Breath. Rescue, Disp: 6.7 g, Rfl: 0    allopurinoL (ZYLOPRIM) 100 MG tablet, Take 1  tablet (100 mg total) by mouth once daily. (Patient taking differently: Take 300 mg by mouth once daily.), Disp: , Rfl:     amiodarone (PACERONE) 100 MG Tab, Take 1 tablet (100 mg total) by mouth once daily., Disp: 90 tablet, Rfl: 0    amLODIPine (NORVASC) 5 MG tablet, Take 2 tablets (10 mg total) by mouth nightly., Disp: 30 tablet, Rfl: 2    apixaban (ELIQUIS) 5 mg Tab, Take 1 tablet (5 mg total) by mouth 2 (two) times daily., Disp: , Rfl:     carvediloL (COREG) 25 MG tablet, TAKE ONE TABLET BY MOUTH TWICE DAILY (Patient taking differently: Take 25 mg by mouth 2 (two) times daily.), Disp: 180 tablet, Rfl: 2    docusate sodium (COLACE) 100 MG capsule, Take 1 capsule (100 mg total) by mouth once daily., Disp: , Rfl: 0    hydrALAZINE (APRESOLINE) 50 MG tablet, Take 3 tablets (150 mg total) by mouth every 12 (twelve) hours. (Patient taking differently: Take 50 mg by mouth 3 (three) times daily.), Disp: 60 tablet, Rfl: 3    levothyroxine (SYNTHROID) 50 MCG tablet, TAKE ONE TABLET BY MOUTH EVERY MORNING ON an empty stomach (Patient taking differently: Take 50 mcg by mouth before breakfast.), Disp: 90 tablet, Rfl: 1    montelukast (SINGULAIR) 10 mg tablet, TAKE ONE TABLET BY MOUTH EVERY EVENING FOR allergies (Patient taking differently: Take 10 mg by mouth every evening.), Disp: 90 tablet, Rfl: 3    sodium bicarbonate 650 MG tablet, Take 1 tablet (650 mg total) by mouth once daily., Disp: 30 tablet, Rfl: 11    bisacodyL (DULCOLAX) 10 mg Supp, Place 1 suppository (10 mg total) rectally 2 (two) times daily as needed (constipation)., Disp: 28 suppository, Rfl: 3    blood sugar diagnostic Strp, To check BG2 times daily,and PRN hypoglycemia  to use with insurance preferred meter Diagnosis Code: E11.65, Disp: 100 each, Rfl: 1    dextromethorphan-guaiFENesin (MUCINEX DM) 60-1,200 mg per 12 hr tablet, Take 1 tablet by mouth 2 (two) times daily as needed (chest congestion)., Disp: 30 tablet, Rfl: 2    ENTRESTO 49-51  mg per tablet, Take 1 tablet by mouth 2 (two) times daily., Disp: , Rfl:     HYDROcodone-acetaminophen (NORCO)  mg per tablet, Take 1 tablet by mouth every 6 (six) hours as needed for Pain., Disp: 20 tablet, Rfl: 0    lancets Misc, To check BG 2 times daily,and PRN hypoglycemia to use with insurance preferred meter Diagnosis Code: E11.65, Disp: 100 each, Rfl: 1    nitroGLYCERIN (NITROSTAT) 0.4 MG SL tablet, Place 1 tablet (0.4 mg total) under the tongue every 5 (five) minutes as needed for Chest pain., Disp: 20 tablet, Rfl: 0    polyethylene glycol (GLYCOLAX) 17 gram/dose powder, Take 17 g by mouth 2 (two) times daily., Disp: 765 g, Rfl: 0      Scheduled Meds:   acetaminophen  1,000 mg Oral Once    [START ON 3/18/2022] amiodarone  100 mg Oral Daily    amLODIPine  5 mg Oral Daily    [START ON 3/18/2022] apixaban  5 mg Oral BID    carvediloL  25 mg Oral BID    furosemide (LASIX) injection  40 mg Intravenous Daily    hydrALAZINE  50 mg Oral Q8H    [START ON 3/18/2022] levothyroxine  50 mcg Oral Before breakfast    [START ON 3/18/2022] montelukast  10 mg Oral Daily    sodium bicarbonate  650 mg Oral Once     Continuous Infusions:  PRN Meds:.hydrALAZINE, sodium chloride 0.9%      Dean Glass  [facility] Hospitalist  03/17/2022

## 2022-03-18 NOTE — HPI
71-year-old woman presents with swelling weight gain, and dyspnea on exertion after not taking several of her heart failure medications and cardiac medications after a recent surgical procedure.

## 2022-03-18 NOTE — SUBJECTIVE & OBJECTIVE
Interval History:  Patient still has some dyspnea on exertion.  She denies orthopnea, nausea, vomiting, palpitations, or chest pain.    Review of Systems   All other systems reviewed and are negative.  Objective:     Vital Signs (Most Recent):  Temp: 98.2 °F (36.8 °C) (03/18/22 0742)  Pulse: (!) 57 (03/18/22 1539)  Resp: 20 (03/18/22 1539)  BP: (!) 167/73 (03/18/22 1539)  SpO2: 96 % (03/18/22 1539)   Vital Signs (24h Range):  Temp:  [96.8 °F (36 °C)-98.2 °F (36.8 °C)] 98.2 °F (36.8 °C)  Pulse:  [54-65] 57  Resp:  [9-38] 20  SpO2:  [95 %-98 %] 96 %  BP: (116-191)/(61-85) 167/73     Weight: 135.4 kg (298 lb 8.1 oz)  Body mass index is 46.75 kg/m².  No intake or output data in the 24 hours ending 03/18/22 1750   Physical Exam  Vitals reviewed.   Constitutional:       General: She is not in acute distress.  HENT:      Head: Normocephalic.   Cardiovascular:      Rate and Rhythm: Normal rate and regular rhythm.      Pulses: Normal pulses.   Pulmonary:      Effort: Pulmonary effort is normal.      Comments: Bibasilar crackles.  Abdominal:      General: There is no distension.      Tenderness: There is no abdominal tenderness.   Skin:     General: Skin is warm.   Neurological:      General: No focal deficit present.      Mental Status: She is alert and oriented to person, place, and time.   Psychiatric:         Mood and Affect: Mood normal.       Significant Labs: All pertinent labs within the past 24 hours have been reviewed.  CBC:   Recent Labs   Lab 03/17/22 1647 03/18/22  1146   WBC 8.25 6.89   HGB 9.5* 9.3*   HCT 31.4* 30.9*    331     CMP:   Recent Labs   Lab 03/17/22  1647 03/18/22  1146    139   K 4.1 4.2    105   CO2 27 24   * 159*   BUN 59* 59*   CREATININE 3.5* 3.4*   CALCIUM 9.6 9.7   PROT 6.4  --    ALBUMIN 3.1*  --    BILITOT 0.5  --    ALKPHOS 106  --    AST 22  --    ALT 18  --    ANIONGAP 6* 10   EGFRNONAA 12.7* 13.1*       Significant Imaging: I have reviewed all pertinent  imaging results/findings within the past 24 hours.

## 2022-03-18 NOTE — PLAN OF CARE
[facility]  Initial Discharge Assessment       Primary Care Provider: Tony Sadler MD    Admission Diagnosis: Congestive heart failure, unspecified HF chronicity, unspecified heart failure type [I50.9]    Admission Date: 3/17/2022  Expected Discharge Date:     Discharge Barriers Identified: None     Assessment completed by chart review. Made call attempt to Maria Luisa López (daughter) [phone], however no answer. POA/advance directives not discussed. No case management needs identified at this time.    Payor: MEDICARE / Plan: MEDICARE PART A & B / Product Type: Government /     Extended Emergency Contact Information  Primary Emergency Contact: ReneMaria Luisa   [place]  Mobile Phone: [phone]  Relation: Daughter    Discharge Plan A: Home with family  Discharge Plan B: Home with family      [id number] - [place] - [address]  [address]  [facility] [address]  Phone: [phone] Fax: [phone]    [facility] - [place], [place] - [address]  [address]  [facility] [address]  Phone: [phone] Fax: [phone]    [id number] - [place], [place] - [address]  [address]  [facility] [address]  Phone: [phone] Fax: [phone]      Initial Assessment (most recent)     Adult Discharge Assessment - 03/18/22 1445        Discharge Assessment    Assessment Type Discharge Planning Assessment     Confirmed/corrected address, phone number and insurance Yes     Confirmed Demographics Correct on Facesheet     Source of Information health record     Communicated HARINI with patient/caregiver No     Lives With child(stan), adult     Do you expect to return to your current living situation? Yes     Do you have help at home or someone to help you manage your care at home? Yes     Who are your caregiver(s) and their phone number(s)? Bam López [phone]     Prior to hospitilization  cognitive status: Alert/Oriented     Current cognitive status: Unable to Assess     Walking or Climbing Stairs Difficulty none     Dressing/Bathing Difficulty none     Equipment Currently Used at Home rollator;cane, straight;CPAP;glucometer     Patient currently being followed by outpatient case management? No     Do you take prescription medications? Yes     Do you have prescription coverage? Yes     Coverage Medicare part A&B     Do you have any problems affording any of your prescribed medications? No     Is the patient taking medications as prescribed? yes     Who is going to help you get home at discharge? Maria Luisa López (daughter) [phone]     How do you get to doctors appointments? family or friend will provide     Are you on dialysis? No     Do you take coumadin? No     Discharge Plan A Home with family     Discharge Plan B Home with family     Discharge Barriers Identified None        Relationship/Environment    Name(s) of Who Lives With Patient Maria Luisa López (daughter) [phone]

## 2022-03-19 LAB
ANION GAP SERPL CALC-SCNC: 11 MMOL/L (ref 8–16)
BUN SERPL-MCNC: 62 MG/DL (ref 8–23)
CALCIUM SERPL-MCNC: 9.8 MG/DL (ref 8.7–10.5)
CHLORIDE SERPL-SCNC: 104 MMOL/L (ref 95–110)
CO2 SERPL-SCNC: 25 MMOL/L (ref 23–29)
CREAT SERPL-MCNC: 3.3 MG/DL (ref 0.5–1.4)
EST. GFR  (AFRICAN AMERICAN): 15.7 ML/MIN/1.73 M^2
EST. GFR  (NON AFRICAN AMERICAN): 13.6 ML/MIN/1.73 M^2
GLUCOSE SERPL-MCNC: 125 MG/DL (ref 70–110)
GLUCOSE SERPL-MCNC: 125 MG/DL (ref 70–110)
GLUCOSE SERPL-MCNC: 129 MG/DL (ref 70–110)
GLUCOSE SERPL-MCNC: 158 MG/DL (ref 70–110)
GLUCOSE SERPL-MCNC: 175 MG/DL (ref 70–110)
POTASSIUM SERPL-SCNC: 4.2 MMOL/L (ref 3.5–5.1)
SODIUM SERPL-SCNC: 140 MMOL/L (ref 136–145)

## 2022-03-19 PROCEDURE — 94761 N-INVAS EAR/PLS OXIMETRY MLT: CPT

## 2022-03-19 PROCEDURE — 63600175 PHARM REV CODE 636 W HCPCS: Performed by: STUDENT IN AN ORGANIZED HEALTH CARE EDUCATION/TRAINING PROGRAM

## 2022-03-19 PROCEDURE — 80048 BASIC METABOLIC PNL TOTAL CA: CPT | Performed by: INTERNAL MEDICINE

## 2022-03-19 PROCEDURE — 25000003 PHARM REV CODE 250: Performed by: INTERNAL MEDICINE

## 2022-03-19 PROCEDURE — 25000003 PHARM REV CODE 250: Performed by: STUDENT IN AN ORGANIZED HEALTH CARE EDUCATION/TRAINING PROGRAM

## 2022-03-19 PROCEDURE — 99900035 HC TECH TIME PER 15 MIN (STAT)

## 2022-03-19 PROCEDURE — 94660 CPAP INITIATION&MGMT: CPT

## 2022-03-19 PROCEDURE — 36415 COLL VENOUS BLD VENIPUNCTURE: CPT | Performed by: INTERNAL MEDICINE

## 2022-03-19 PROCEDURE — 63600175 PHARM REV CODE 636 W HCPCS: Performed by: INTERNAL MEDICINE

## 2022-03-19 PROCEDURE — 21000000 HC CCU ICU ROOM CHARGE

## 2022-03-19 RX ORDER — BISACODYL 10 MG
10 SUPPOSITORY, RECTAL RECTAL ONCE
Status: COMPLETED | OUTPATIENT
Start: 2022-03-19 | End: 2022-03-19

## 2022-03-19 RX ORDER — POLYETHYLENE GLYCOL 3350 17 G/17G
17 POWDER, FOR SOLUTION ORAL DAILY
Status: DISCONTINUED | OUTPATIENT
Start: 2022-03-19 | End: 2022-03-20 | Stop reason: HOSPADM

## 2022-03-19 RX ORDER — TORSEMIDE 20 MG/1
20 TABLET ORAL DAILY
Status: DISCONTINUED | OUTPATIENT
Start: 2022-03-20 | End: 2022-03-19

## 2022-03-19 RX ORDER — AMLODIPINE BESYLATE 5 MG/1
10 TABLET ORAL DAILY
Status: DISCONTINUED | OUTPATIENT
Start: 2022-03-19 | End: 2022-03-20 | Stop reason: HOSPADM

## 2022-03-19 RX ORDER — TORSEMIDE 20 MG/1
20 TABLET ORAL DAILY
Status: DISCONTINUED | OUTPATIENT
Start: 2022-03-19 | End: 2022-03-20 | Stop reason: HOSPADM

## 2022-03-19 RX ADMIN — AMLODIPINE BESYLATE 10 MG: 5 TABLET ORAL at 09:03

## 2022-03-19 RX ADMIN — MONTELUKAST 10 MG: 10 TABLET, FILM COATED ORAL at 10:03

## 2022-03-19 RX ADMIN — APIXABAN 5 MG: 5 TABLET, FILM COATED ORAL at 10:03

## 2022-03-19 RX ADMIN — CARVEDILOL 25 MG: 25 TABLET, FILM COATED ORAL at 10:03

## 2022-03-19 RX ADMIN — HUMAN INSULIN 2 UNITS: 100 INJECTION, SOLUTION SUBCUTANEOUS at 12:03

## 2022-03-19 RX ADMIN — HYDRALAZINE HYDROCHLORIDE 10 MG: 20 INJECTION INTRAMUSCULAR; INTRAVENOUS at 04:03

## 2022-03-19 RX ADMIN — POLYETHYLENE GLYCOL 3350 17 G: 17 POWDER, FOR SOLUTION ORAL at 05:03

## 2022-03-19 RX ADMIN — BISACODYL 10 MG: 10 SUPPOSITORY RECTAL at 05:03

## 2022-03-19 RX ADMIN — TORSEMIDE 20 MG: 20 TABLET ORAL at 10:03

## 2022-03-19 RX ADMIN — CARVEDILOL 25 MG: 25 TABLET, FILM COATED ORAL at 09:03

## 2022-03-19 RX ADMIN — HUMAN INSULIN 2 UNITS: 100 INJECTION, SOLUTION SUBCUTANEOUS at 05:03

## 2022-03-19 RX ADMIN — LEVOTHYROXINE SODIUM 50 MCG: 0.03 TABLET ORAL at 05:03

## 2022-03-19 RX ADMIN — ALLOPURINOL 100 MG: 100 TABLET ORAL at 10:03

## 2022-03-19 RX ADMIN — APIXABAN 5 MG: 5 TABLET, FILM COATED ORAL at 09:03

## 2022-03-19 NOTE — ASSESSMENT & PLAN NOTE
Patient is identified as having Combined Systolic and Diastolic heart failure that is Acute on chronic. Latest ECHO performed and demonstrates- Results for orders placed during the hospital encounter of 11/19/21    Echo    Interpretation Summary  · The estimated ejection fraction is 35-40%.  · The left ventricle is moderately enlarged with moderate concentric hypertrophy  · Severe left atrial enlargement.  · Normal right ventricular size with normal right ventricular systolic function.  · Mild-to-moderate mitral regurgitation.  · Mild tricuspid regurgitation.  · The estimated PA systolic pressure is 44 mmHg.  · There is pulmonary hypertension.  Monitor on telemetry. Patient is off CHF pathway.  Monitor strict Is&Os and daily weights.  Place on fluid restriction of 1.5 L. Continue to stress to patient importance of self efficacy and  on diet for CHF. Last BNP reviewed- and noted below   Recent Labs   Lab 03/17/22  1647   BNP 1,380*      Continue Coreg  Discontinue IV Lasix  Start p.o. torsemide

## 2022-03-19 NOTE — SUBJECTIVE & OBJECTIVE
Interval History:  Bibasilar crackles improving today.  Patient feels like she is doing better today., transition to p.o. torsemide today in anticipate discharge in the morning.  She denies chest pain, nausea, vomiting, headache, dyspnea.    Review of Systems   All other systems reviewed and are negative.  Objective:     Vital Signs (Most Recent):  Temp: 98.6 °F (37 °C) (03/19/22 1530)  Pulse: 61 (03/19/22 1530)  Resp: (!) 22 (03/19/22 1530)  BP: (!) 176/75 (03/19/22 1530)  SpO2: 97 % (03/19/22 1530)   Vital Signs (24h Range):  Temp:  [97.2 °F (36.2 °C)-98.6 °F (37 °C)] 98.6 °F (37 °C)  Pulse:  [57-62] 61  Resp:  [16-31] 22  SpO2:  [92 %-97 %] 97 %  BP: (148-180)/(65-77) 176/75     Weight: 135.4 kg (298 lb 8.1 oz)  Body mass index is 46.75 kg/m².    Intake/Output Summary (Last 24 hours) at 3/19/2022 1657  Last data filed at 3/19/2022 1600  Gross per 24 hour   Intake --   Output 900 ml   Net -900 ml      Physical Exam  Vitals reviewed.   Constitutional:       General: She is not in acute distress.  HENT:      Head: Normocephalic.   Cardiovascular:      Rate and Rhythm: Normal rate and regular rhythm.      Pulses: Normal pulses.   Pulmonary:      Effort: Pulmonary effort is normal.      Comments: Bibasilar crackles.  Abdominal:      General: There is no distension.      Tenderness: There is no abdominal tenderness.   Skin:     General: Skin is warm.   Neurological:      General: No focal deficit present.      Mental Status: She is alert and oriented to person, place, and time.   Psychiatric:         Mood and Affect: Mood normal.     Significant Labs: All pertinent labs within the past 24 hours have been reviewed.  CBC:   Recent Labs   Lab 03/18/22  1146   WBC 6.89   HGB 9.3*   HCT 30.9*        CMP:   Recent Labs   Lab 03/18/22  1146 03/19/22  0740    140   K 4.2 4.2    104   CO2 24 25   * 125*   BUN 59* 62*   CREATININE 3.4* 3.3*   CALCIUM 9.7 9.8   ANIONGAP 10 11   EGFRNONAA 13.1* 13.6*        Significant Imaging: I have reviewed all pertinent imaging results/findings within the past 24 hours.

## 2022-03-19 NOTE — PLAN OF CARE
03/19/22 1312   Patient Assessment/Suction   Level of Consciousness (AVPU) alert   Respiratory Effort Unlabored   All Lung Fields Breath Sounds diminished   PRE-TX-O2   O2 Device (Oxygen Therapy) room air   SpO2 (!) 92 %   Pulse Oximetry Type Continuous   $ Pulse Oximetry - Multiple Charge Pulse Oximetry - Multiple   Pulse 60   Resp (!) 21   Skin Integrity   $ Wound Care Tech Time 15 min   Area Observed Bridge of nose   Skin Appearance without discoloration   Preset CPAP/BiPAP Settings   Mode Of Delivery Standby   $ CPAP/BiPAP Daily Charge BiPAP/CPAP Daily   $ Initial CPAP/BiPAP Setup? No   Size of Mask Medium/Large   Sized Appropriately? Yes   Equipment Type DeVilbiss   Respiratory Evaluation   $ Care Plan Tech Time 15 min

## 2022-03-19 NOTE — CARE UPDATE
03/18/22 2323   Patient Assessment/Suction   Respiratory Effort Unlabored   Rhythm/Pattern, Respiratory pattern regular   PRE-TX-O2   O2 Device (Oxygen Therapy) CPAP   SpO2 (!) 94 %   Pulse Oximetry Type Continuous   Pulse (!) 57   Resp 16   Skin Integrity   $ Wound Care Tech Time 15 min   Area Observed Bridge of nose   Skin Appearance without discoloration   Preset CPAP/BiPAP Settings   Mode Of Delivery CPAP   $ CPAP/BiPAP Daily Charge BiPAP/CPAP Daily   $ Initial CPAP/BiPAP Setup? Yes   $ Is patient using? Yes   Size of Mask Medium/Large   Sized Appropriately? Yes   Equipment Type DeVilbiss   CPAP (cm H2O) 8   Patient CPAP/BiPAP Settings   RR Total (Breaths/Min) 16   Tidal Volume (mL) 597   VE Minute Ventilation (L/min) 12.3 L/min   Total Leak (L/Min) 30   CPAP/BiPAP Backup Settings   EPAP Backup 8 cmH2O   Education   $ Education 15 min;BiPAP   Respiratory Evaluation   $ Care Plan Tech Time 15 min   $ Eval/Re-eval Charges Evaluation   Evaluation For New Orders

## 2022-03-19 NOTE — CONSULTS
Consult noted for diet education. Patient fairly compliant with diet at home. Follows low Na and ADA diet recommendations. Questions answered and nutrition care discussed with patient. Thanks for consult!    Veronica Garcia RD 03/19/2022 11:10 AM

## 2022-03-19 NOTE — PROGRESS NOTES
[facility] Medicine  Progress Note    Patient Name: Juhi Taylor  MRN: [id number]  Patient Class: IP- Inpatient   Admission Date: 3/17/2022  Length of Stay: 2 days  Attending Physician: Terence Sterling MD  Primary Care Provider: Tony Sadler MD        Subjective:     Principal Problem:Acute on chronic congestive heart failure        HPI:  71-year-old woman presents with swelling weight gain, and dyspnea on exertion after not taking several of her heart failure medications and cardiac medications after a recent surgical procedure.      Overview/Hospital Course:  No notes on file    Interval History:  Bibasilar crackles improving today.  Patient feels like she is doing better today., transition to p.o. torsemide today in anticipate discharge in the morning.  She denies chest pain, nausea, vomiting, headache, dyspnea.    Review of Systems   All other systems reviewed and are negative.  Objective:     Vital Signs (Most Recent):  Temp: 98.6 °F (37 °C) (03/19/22 1530)  Pulse: 61 (03/19/22 1530)  Resp: (!) 22 (03/19/22 1530)  BP: (!) 176/75 (03/19/22 1530)  SpO2: 97 % (03/19/22 1530)   Vital Signs (24h Range):  Temp:  [97.2 °F (36.2 °C)-98.6 °F (37 °C)] 98.6 °F (37 °C)  Pulse:  [57-62] 61  Resp:  [16-31] 22  SpO2:  [92 %-97 %] 97 %  BP: (148-180)/(65-77) 176/75     Weight: 135.4 kg (298 lb 8.1 oz)  Body mass index is 46.75 kg/m².    Intake/Output Summary (Last 24 hours) at 3/19/2022 1657  Last data filed at 3/19/2022 1600  Gross per 24 hour   Intake --   Output 900 ml   Net -900 ml      Physical Exam  Vitals reviewed.   Constitutional:       General: She is not in acute distress.  HENT:      Head: Normocephalic.   Cardiovascular:      Rate and Rhythm: Normal rate and regular rhythm.      Pulses: Normal pulses.   Pulmonary:      Effort: Pulmonary effort is normal.      Comments: Bibasilar crackles.  Abdominal:      General: There is no distension.      Tenderness: There is no  abdominal tenderness.   Skin:     General: Skin is warm.   Neurological:      General: No focal deficit present.      Mental Status: She is alert and oriented to person, place, and time.   Psychiatric:         Mood and Affect: Mood normal.     Significant Labs: All pertinent labs within the past 24 hours have been reviewed.  CBC:   Recent Labs   Lab 03/18/22  1146   WBC 6.89   HGB 9.3*   HCT 30.9*        CMP:   Recent Labs   Lab 03/18/22  1146 03/19/22  0740    140   K 4.2 4.2    104   CO2 24 25   * 125*   BUN 59* 62*   CREATININE 3.4* 3.3*   CALCIUM 9.7 9.8   ANIONGAP 10 11   EGFRNONAA 13.1* 13.6*       Significant Imaging: I have reviewed all pertinent imaging results/findings within the past 24 hours.      Assessment/Plan:      * Acute on chronic congestive heart failure  Patient is identified as having Combined Systolic and Diastolic heart failure that is Acute on chronic. Latest ECHO performed and demonstrates- Results for orders placed during the hospital encounter of 11/19/21    Echo    Interpretation Summary  · The estimated ejection fraction is 35-40%.  · The left ventricle is moderately enlarged with moderate concentric hypertrophy  · Severe left atrial enlargement.  · Normal right ventricular size with normal right ventricular systolic function.  · Mild-to-moderate mitral regurgitation.  · Mild tricuspid regurgitation.  · The estimated PA systolic pressure is 44 mmHg.  · There is pulmonary hypertension.  Monitor on telemetry. Patient is off CHF pathway.  Monitor strict Is&Os and daily weights.  Place on fluid restriction of 1.5 L. Continue to stress to patient importance of self efficacy and  on diet for CHF. Last BNP reviewed- and noted below   Recent Labs   Lab 03/17/22  1647   BNP 1,380*      Continue Coreg  Discontinue IV Lasix  Start p.o. torsemide      Paroxysmal atrial fibrillation  Continue amiodarone  Continue apixaban      Hypothyroidism  Continue  Synthroid      Diabetes mellitus  Insulin sliding scale as needed        VTE Risk Mitigation (From admission, onward)         Ordered     apixaban tablet 5 mg  2 times daily         03/17/22 1938     IP VTE HIGH RISK PATIENT  Once         03/17/22 1933     Place sequential compression device  Until discontinued         03/17/22 1933                Discharge Planning   HARINI: 3/20/2022     Code Status: Full Code   Is the patient medically ready for discharge?:     Reason for patient still in hospital (select all that apply): Consult recommendations and Pending disposition  Discharge Plan A: Home with family                  Terence Sterling MD  Department of Hospital Medicine   [facility]

## 2022-03-20 VITALS
DIASTOLIC BLOOD PRESSURE: 68 MMHG | WEIGHT: 293 LBS | BODY MASS INDEX: 45.99 KG/M2 | SYSTOLIC BLOOD PRESSURE: 151 MMHG | HEART RATE: 59 BPM | OXYGEN SATURATION: 95 % | RESPIRATION RATE: 18 BRPM | HEIGHT: 67 IN | TEMPERATURE: 98 F

## 2022-03-20 PROBLEM — I50.23 ACUTE ON CHRONIC SYSTOLIC CHF (CONGESTIVE HEART FAILURE): Status: ACTIVE | Noted: 2021-11-20

## 2022-03-20 LAB
ANION GAP SERPL CALC-SCNC: 10 MMOL/L (ref 8–16)
BUN SERPL-MCNC: 63 MG/DL (ref 8–23)
CALCIUM SERPL-MCNC: 9.3 MG/DL (ref 8.7–10.5)
CHLORIDE SERPL-SCNC: 103 MMOL/L (ref 95–110)
CO2 SERPL-SCNC: 26 MMOL/L (ref 23–29)
CREAT SERPL-MCNC: 3 MG/DL (ref 0.5–1.4)
EST. GFR  (AFRICAN AMERICAN): 17.6 ML/MIN/1.73 M^2
EST. GFR  (NON AFRICAN AMERICAN): 15.3 ML/MIN/1.73 M^2
GLUCOSE SERPL-MCNC: 115 MG/DL (ref 70–110)
GLUCOSE SERPL-MCNC: 119 MG/DL (ref 70–110)
GLUCOSE SERPL-MCNC: 143 MG/DL (ref 70–110)
MAGNESIUM SERPL-MCNC: 2.2 MG/DL (ref 1.6–2.6)
POTASSIUM SERPL-SCNC: 4.1 MMOL/L (ref 3.5–5.1)
SODIUM SERPL-SCNC: 139 MMOL/L (ref 136–145)

## 2022-03-20 PROCEDURE — 25000003 PHARM REV CODE 250: Performed by: INTERNAL MEDICINE

## 2022-03-20 PROCEDURE — 83735 ASSAY OF MAGNESIUM: CPT | Performed by: STUDENT IN AN ORGANIZED HEALTH CARE EDUCATION/TRAINING PROGRAM

## 2022-03-20 PROCEDURE — 25000003 PHARM REV CODE 250: Performed by: STUDENT IN AN ORGANIZED HEALTH CARE EDUCATION/TRAINING PROGRAM

## 2022-03-20 PROCEDURE — 36415 COLL VENOUS BLD VENIPUNCTURE: CPT | Performed by: INTERNAL MEDICINE

## 2022-03-20 PROCEDURE — 80048 BASIC METABOLIC PNL TOTAL CA: CPT | Performed by: INTERNAL MEDICINE

## 2022-03-20 RX ORDER — AMLODIPINE BESYLATE 5 MG/1
10 TABLET ORAL NIGHTLY
Qty: 30 TABLET | Refills: 2
Start: 2022-03-20 | End: 2022-12-05

## 2022-03-20 RX ORDER — TORSEMIDE 20 MG/1
20 TABLET ORAL DAILY
Qty: 30 TABLET | Refills: 11 | Status: ON HOLD | OUTPATIENT
Start: 2022-03-21 | End: 2022-04-09 | Stop reason: SDUPTHER

## 2022-03-20 RX ORDER — ALIROCUMAB 75 MG/ML
75 INJECTION, SOLUTION SUBCUTANEOUS
Qty: 6 ML | Refills: 3
Start: 2022-03-20 | End: 2022-10-19 | Stop reason: SDUPTHER

## 2022-03-20 RX ORDER — LANOLIN ALCOHOL/MO/W.PET/CERES
400 CREAM (GRAM) TOPICAL DAILY PRN
Qty: 30 TABLET | Refills: 0 | Status: SHIPPED | OUTPATIENT
Start: 2022-03-20 | End: 2022-05-09 | Stop reason: SDUPTHER

## 2022-03-20 RX ADMIN — POLYETHYLENE GLYCOL 3350 17 G: 17 POWDER, FOR SOLUTION ORAL at 08:03

## 2022-03-20 RX ADMIN — ALLOPURINOL 100 MG: 100 TABLET ORAL at 08:03

## 2022-03-20 RX ADMIN — APIXABAN 5 MG: 5 TABLET, FILM COATED ORAL at 08:03

## 2022-03-20 RX ADMIN — CARVEDILOL 25 MG: 25 TABLET, FILM COATED ORAL at 08:03

## 2022-03-20 RX ADMIN — TORSEMIDE 20 MG: 20 TABLET ORAL at 08:03

## 2022-03-20 RX ADMIN — MONTELUKAST 10 MG: 10 TABLET, FILM COATED ORAL at 08:03

## 2022-03-20 RX ADMIN — LEVOTHYROXINE SODIUM 50 MCG: 0.03 TABLET ORAL at 05:03

## 2022-03-20 RX ADMIN — AMLODIPINE BESYLATE 10 MG: 5 TABLET ORAL at 08:03

## 2022-03-20 NOTE — PLAN OF CARE
Chart and discharge orders reviewed.  Patient discharged home with no further case management needs.     03/20/22 1344   Final Note   Assessment Type Final Discharge Note   Anticipated Discharge Disposition Home   Post-Acute Status   Discharge Delays None known at this time

## 2022-03-20 NOTE — PROGRESS NOTES
Nephrology Consult Note        Patient Name: Juhi Taylor  MRN: [id number]    Patient Class: IP- Inpatient   Admission Date: 3/17/2022  Length of Stay: 3 days  Date of Service: 3/20/2022    Attending Physician: Terence Sterling MD  Primary Care Provider: Tony Sadler MD    Reason for Consult: poppy/chf exacerbation/ckd3/anemia/htn    SUBJECTIVE:     HPI: 71AAF with known history of right nephrectomy on 03/10/2022 at [facility] due to renal cell carcinoma, systolic and diastolic CHF, diabetes, hypertension, obstructive sleep apnea on CPAP, pulmonary hypertension, coronary disease status post stent, CKD 3 followed by Dr. Martniez comes to the ER with SOB and weight gain. Since the surgery, Lasix was placed on hold. She has gained about 18 lb, has been having low p.o. intake, shortness of breath was worsening. Renal labs are also worsening and her BNP is 1,380.    3/19 VSS, feels better. Good UO. Switch to torsemide, consider d/c in next 24/48h.  3/20 VSS, good UO, OK to dc on current diuretic and f/u with me as outpatient. D/w Attending.    Past Medical History:   Diagnosis Date    Anticoagulant long-term use     Arthritis     Breast cancer 2014    invasive lobular carcinoma    Cancer of kidney 11/2020    RIGHT KIDNEY CANCER    CHF (congestive heart failure)     Coronary artery disease dx 2005    Depression     Diabetes mellitus     Diastolic heart failure secondary to hypertension     Gout     Hyperlipemia     Hypertension     Hypertrophy of nasal turbinates     Kidney mass 2020    Right    Levoscoliosis     Lung nodule     left    Multiple thyroid nodules     NICOLE (obstructive sleep apnea)     uses C-PAP    Pulmonary hypertension      Past Surgical History:   Procedure Laterality Date    AORTOGRAPHY N/A 12/04/2020    Procedure: Aortogram;  Surgeon: Paul Pedersen MD;  Location: [facility] CATH;  Service: Cardiology;  Laterality: N/A;    CARDIAC CATHETERIZATION  12/2020    CHOLECYSTECTOMY    "   COLONOSCOPY      multi -last 2014     ESOPHAGOGASTRODUODENOSCOPY      2012     HAND SURGERY Right     LAPAROSCOPIC ROBOT-ASSISTED SURGICAL REMOVAL OF KIDNEY USING DA CHELLE XI Right 3/10/2022    Procedure: XI ROBOTIC NEPHRECTOMY- radical;  Surgeon: Rolando Ramirez MD;  Location: [facility] OR;  Service: Urology;  Laterality: Right;    MASTECTOMY W/ SENTINEL NODE BIOPSY Bilateral 2015    bilateral "dog ears"    NASAL SINUS SURGERY      Dr Bryant FESS/cauterization turbinate     PARTIAL HYSTERECTOMY      PERCUTANEOUS TRANSLUMINAL BALLOON ANGIOPLASTY OF CORONARY ARTERY  2020    Procedure: Angioplasty-coronary;  Surgeon: Paul Pedersen MD;  Location: [facility] CATH;  Service: Cardiology;;    RENAL BIOPSY Right     2021 EJ    TUBAL LIGATION      ULTRASOUND GUIDANCE  2020    Procedure: Ultrasound Guidance;  Surgeon: Paul Pedersen MD;  Location: [facility] CATH;  Service: Cardiology;;     Family History   Problem Relation Age of Onset    Breast cancer Mother     Stroke Father     Hypertension Father     Hepatitis Brother     Asthma Daughter     Birth defects Daughter         Nell's two children has cleft lips    Depression Daughter     Drug abuse Daughter     Learning disabilities Daughter     Mental illness Daughter     Breast cancer Maternal Aunt     Glaucoma Sister     Drug abuse Daughter     Macular degeneration Neg Hx     Retinal detachment Neg Hx      Social History     Tobacco Use    Smoking status: Former Smoker     Packs/day: 1.00     Years: 0.00     Pack years: 0.00     Types: Cigarettes     Quit date: 2016     Years since quittin.2    Smokeless tobacco: Never Used    Tobacco comment: quit    Substance Use Topics    Alcohol use: No    Drug use: No       Review of patient's allergies indicates:   Allergen Reactions    Percocet [oxycodone-acetaminophen] Itching    Irbesartan Swelling    Januvia [sitagliptin]     Jardiance [empagliflozin]     "  Leg cramps    Lipitor [atorvastatin] Other (See Comments)     Severe leg pain    Linaclotide Other (See Comments) and Nausea And Vomiting     Does not remember    Lubiprostone Other (See Comments) and Palpitations     Does not remember       Outpatient meds:  No current facility-administered medications on file prior to encounter.     Current Outpatient Medications on File Prior to Encounter   Medication Sig Dispense Refill    albuterol (PROVENTIL/VENTOLIN HFA) 90 mcg/actuation inhaler Inhale 1-2 puffs into the lungs every 6 (six) hours as needed for Wheezing or Shortness of Breath. Rescue 6.7 g 0    allopurinoL (ZYLOPRIM) 100 MG tablet Take 1 tablet (100 mg total) by mouth once daily. (Patient taking differently: Take 300 mg by mouth once daily.)      amiodarone (PACERONE) 100 MG Tab Take 1 tablet (100 mg total) by mouth once daily. 90 tablet 0    amLODIPine (NORVASC) 5 MG tablet Take 2 tablets (10 mg total) by mouth nightly. 30 tablet 2    apixaban (ELIQUIS) 5 mg Tab Take 1 tablet (5 mg total) by mouth 2 (two) times daily.      carvediloL (COREG) 25 MG tablet TAKE ONE TABLET BY MOUTH TWICE DAILY (Patient taking differently: Take 25 mg by mouth 2 (two) times daily.) 180 tablet 2    docusate sodium (COLACE) 100 MG capsule Take 1 capsule (100 mg total) by mouth once daily.  0    hydrALAZINE (APRESOLINE) 50 MG tablet Take 3 tablets (150 mg total) by mouth every 12 (twelve) hours. (Patient taking differently: Take 50 mg by mouth 3 (three) times daily.) 60 tablet 3    levothyroxine (SYNTHROID) 50 MCG tablet TAKE ONE TABLET BY MOUTH EVERY MORNING ON an empty stomach (Patient taking differently: Take 50 mcg by mouth before breakfast.) 90 tablet 1    montelukast (SINGULAIR) 10 mg tablet TAKE ONE TABLET BY MOUTH EVERY EVENING FOR allergies (Patient taking differently: Take 10 mg by mouth every evening.) 90 tablet 3    sodium bicarbonate 650 MG tablet Take 1 tablet (650 mg total) by mouth once daily. 30 tablet  11    bisacodyL (DULCOLAX) 10 mg Supp Place 1 suppository (10 mg total) rectally 2 (two) times daily as needed (constipation). 28 suppository 3    blood sugar diagnostic Strp To check BG2 times daily,and PRN hypoglycemia  to use with insurance preferred meter Diagnosis Code: E11.65 100 each 1    dextromethorphan-guaiFENesin (MUCINEX DM) 60-1,200 mg per 12 hr tablet Take 1 tablet by mouth 2 (two) times daily as needed (chest congestion). 30 tablet 2    ENTRESTO 49-51 mg per tablet Take 1 tablet by mouth 2 (two) times daily.      HYDROcodone-acetaminophen (NORCO)  mg per tablet Take 1 tablet by mouth every 6 (six) hours as needed for Pain. 20 tablet 0    lancets Misc To check BG 2 times daily,and PRN hypoglycemia to use with insurance preferred meter Diagnosis Code: E11.65 100 each 1    nitroGLYCERIN (NITROSTAT) 0.4 MG SL tablet Place 1 tablet (0.4 mg total) under the tongue every 5 (five) minutes as needed for Chest pain. 20 tablet 0    polyethylene glycol (GLYCOLAX) 17 gram/dose powder Take 17 g by mouth 2 (two) times daily. 765 g 0    [DISCONTINUED] aspirin (ECOTRIN) 81 MG EC tablet Take 1 tablet (81 mg total) by mouth once daily. 90 tablet 3    [DISCONTINUED] DULoxetine (CYMBALTA) 20 MG capsule TAKE ONE CAPSULE BY MOUTH ONCE DAILY 30 capsule 4    [DISCONTINUED] folic acid (FOLVITE) 1 MG tablet Take 1 mg by mouth once daily.      [DISCONTINUED] furosemide (LASIX) 40 MG tablet Take 1 tablet (40 mg total) by mouth once daily. 90 tablet 0    [DISCONTINUED] metFORMIN (GLUCOPHAGE-XR) 500 MG ER 24hr tablet Take 2 tablets (1,000 mg total) by mouth 2 (two) times daily with meals. 360 tablet 3    [DISCONTINUED] PRALUENT PEN 75 mg/mL PnIj Inject 1 mL (75 mg total) into the skin every 14 (fourteen) days. 6 Syringe 3       Scheduled meds:   allopurinoL  100 mg Oral Daily    amLODIPine  10 mg Oral Daily    apixaban  5 mg Oral BID    carvediloL  25 mg Oral BID    levothyroxine  50 mcg Oral Before  breakfast    montelukast  10 mg Oral Daily    polyethylene glycol  17 g Oral Daily    torsemide  20 mg Oral Daily       Infusions:      PRN meds:  dextrose 50%, dextrose 50%, hydrALAZINE, HYDROcodone-acetaminophen, insulin regular, sodium chloride 0.9%    Review of Systems:    OBJECTIVE:     Vital Signs and IO (Last 24H):  Temp:  [97.4 °F (36.3 °C)-98.6 °F (37 °C)]   Pulse:  [56-68]   Resp:  [20-26]   BP: (138-185)/(61-79)   SpO2:  [92 %-97 %]   I/O last 3 completed shifts:  In: -   Out: 1900 [Urine:1900]    Wt Readings from Last 5 Encounters:   03/17/22 135.4 kg (298 lb 8.1 oz)   03/10/22 130.5 kg (287 lb 11.2 oz)   03/08/22 130.4 kg (287 lb 7.7 oz)   03/07/22 130.5 kg (287 lb 11.2 oz)   02/23/22 130.6 kg (287 lb 14.7 oz)     Physical Exam:  Physical Exam  Constitutional:       Appearance: She is well-developed. She is not diaphoretic.   HENT:      Head: Normocephalic and atraumatic.   Eyes:      General: No scleral icterus.     Pupils: Pupils are equal, round, and reactive to light.   Cardiovascular:      Rate and Rhythm: Normal rate and regular rhythm.   Pulmonary:      Effort: Pulmonary effort is normal. No respiratory distress.      Breath sounds: No stridor.   Abdominal:      General: There is no distension.      Palpations: Abdomen is soft.   Musculoskeletal:         General: No deformity. Normal range of motion.      Cervical back: Neck supple.   Skin:     General: Skin is warm and dry.      Findings: No erythema or rash.   Neurological:      Mental Status: She is alert and oriented to person, place, and time.      Cranial Nerves: No cranial nerve deficit.   Psychiatric:         Behavior: Behavior normal.       Body mass index is 46.75 kg/m².    Laboratory:  Recent Labs   Lab 03/18/22  1146 03/19/22  0740 03/20/22  0540    140 139   K 4.2 4.2 4.1    104 103   CO2 24 25 26   BUN 59* 62* 63*   CREATININE 3.4* 3.3* 3.0*   ESTGFRAFRICA 15.1* 15.7* 17.6*   EGFRNONAA 13.1* 13.6* 15.3*   *  125* 115*       Recent Labs   Lab 03/17/22  1647 03/18/22  1146 03/19/22  0740 03/20/22  0540   CALCIUM 9.6 9.7 9.8 9.3   ALBUMIN 3.1*  --   --   --    MG 2.1  --   --  2.2       Recent Labs   Lab 08/18/20  1326 04/14/21  1014 12/30/21  1113   PTH, Intact 180.0 H 197.0 H 164.1 H       No results for input(s): POCTGLUCOSE in the last 168 hours.    Recent Labs   Lab 08/16/21  1108 11/19/21  1450 02/16/22  1038   Hemoglobin A1C 6.3 H 7.0 H 5.5       Recent Labs   Lab 03/17/22  1647 03/18/22  1146   WBC 8.25 6.89   HGB 9.5* 9.3*   HCT 31.4* 30.9*    331   MCV 83 85   MCHC 30.3* 30.1*   MONO 7.9  0.7 8.3  0.6       Recent Labs   Lab 03/17/22  1647   BILITOT 0.5   PROT 6.4   ALBUMIN 3.1*   ALKPHOS 106   ALT 18   AST 22       Recent Labs   Lab 10/18/20  1905 11/16/20  1625 04/14/21  0952 12/30/21  1135 03/17/22  2240   Color, UA Yellow Yellow Yellow  --  Colorless A   Appearance, UA Clear Clear Clear  --  Clear   pH, UA 7.0 5.0 5.0  --  5.0   Specific Gravity, UA 1.020 1.015 1.020  --  1.010   Protein, UA 3+ A 3+ A 2+ A  --  Negative   Glucose, UA Negative 1+ A Negative  --  Negative   Ketones, UA Negative Negative Negative  --  Negative   Urobilinogen, UA Negative  --   --   --  Negative   Bilirubin (UA) Negative Negative Negative  --  Negative   Occult Blood UA Trace A Negative Negative  --  Negative   Nitrite, UA Negative Negative Negative  --  Negative   RBC, UA 3 1 0 1  --    WBC, UA 1 1 2 1  --    Bacteria None Moderate A Few A Rare  --    Hyaline Casts, UA 1 1 0  --   --              Microbiology Results (last 7 days)     Procedure Component Value Units Date/Time    Blood culture #2 **CANNOT BE ORDERED STAT** [[id number]] Collected: 03/17/22 1811    Order Status: Completed Specimen: Blood from Peripheral, Ankle, Right Updated: 03/19/22 2232     Blood Culture, Routine No Growth to date      No Growth to date      No Growth to date    Blood culture #1 **CANNOT BE ORDERED STAT** [[id number]] Collected: 03/17/22  1811    Order Status: Completed Specimen: Blood from Peripheral, Antecubital, Right Updated: 03/19/22 2232     Blood Culture, Routine No Growth to date      No Growth to date      No Growth to date          ASSESSMENT/PLAN:     PEACE  CKD stage 3  CHF exacerbation due to holding diuretics  s/p nephrectomy for RCC on 3/10  Gout  No NSAIDs, ACEI/ARB, IV contrast or other nephrotoxins.  Keep MAP > 60, SBP > 100.  Dose meds for GFR < 30 ml/min.  Renal diet - low K, low phos.  Agree with diuretics, switch to PO torsemide.  Resumed allopurinol at 100mg /day.    Anemia of CKD  Hgb and HCT are acceptable. Monitor.  Will provide PONCE and/or IV iron PRN.    MBD / Secondary HPT  Ca, phos, PTH and vitamin D levels are acceptable.   Phos binders, vitamin D analogues and calcimimetics as needed.    HTN  BP seem controlled but low since started diuretics.   Tolerate asymptomatic HTN up to -160.  Hold Amlodipine and Hydralazine, give diuretic.  Resume Entresto as outpatient.  Low sodium diet.    Thank you for allowing us to participate in the care of your patient!   We will follow the patient and provide recommendations as needed.    Patient care time was spent personally by me on the following activities:   · Obtaining a history.  · Examination of patient.  · Providing medical care at the patients bedside.  · Developing a treatment plan with patient or surrogate and bedside caregivers.  · Ordering and reviewing laboratory studies, radiographic studies, pulse oximetry.  · Ordering and performing treatments and interventions.  · Evaluation of patient's response to treatment.  · Discussions with consultants while on the unit and immediately available to the patient.  · Re-evaluation of the patient's condition.  · Documentation in the medical record.     Paolo Martinez MD    [facility]  [address] [place] [address]  JULIANNA Shah [address]    [phone] - tel  [phone] - fax    3/20/2022

## 2022-03-20 NOTE — HOSPITAL COURSE
71-year-old woman prior medical history of recent nephrectomy and systolic CHF who presents with bilateral lower extremity edema, dyspnea, and weight gain who was found have acute on chronic systolic CHF exacerbation after her Entresto and diuretics were stopped after her nephrectomy 2 weeks ago.  Patient was found have acute kidney injury, prerenal etiology in the setting CHF exacerbation.  Patient was started on IV diuretics with improvement in her creatinine.  The patient was transitioned to p.o. torsemide.  She will discharge on many of her home medications, but Entresto and metformin will be held due to poor renal function.  She will follow-up as an outpatient for repeat lab draw in 1-2 weeks and reassess restarting her Entresto and metformin.  Patient was counseled at bedside and she acknowledges understanding.  Furthermore, she was encouraged to make a postoperative his appointment for her urologist, Dr. Ramirez.    Physical Exam  Vitals reviewed.   Constitutional:       General: She is not in acute distress.  Sitting in bed.  Conversant  HENT:      Head: Normocephalic.   Cardiovascular:      Rate and Rhythm: Normal rate and regular rhythm.      Pulses: Normal pulses.   Pulmonary:      Effort: Pulmonary effort is normal.      Comments:  Lungs clear to auscultation bilaterally.  No wheeze or crackles.  Abdominal:      General: There is no distension.  Abdominal incisions are well approximated and healing well.  No erythema or purulence     Tenderness: There is no abdominal tenderness.   Skin:     General: Skin is warm.   Neurological:      General: No focal deficit present.  Moving all 4 extremities.  Cranial nerves 2-12 intact

## 2022-03-20 NOTE — DISCHARGE SUMMARY
Critical access hospital Medicine  Discharge Summary      Patient Name: Juhi Taylor  MRN: [id number]  Patient Class: IP- Inpatient  Admission Date: 3/17/2022  Hospital Length of Stay: 3 days  Discharge Date and Time:  03/20/2022 12:40 PM  Attending Physician: Terence Sterling MD   Discharging Provider: Terence Sterling MD  Primary Care Provider: Tony Sadler MD      HPI:   71-year-old woman presents with swelling weight gain, and dyspnea on exertion after not taking several of her heart failure medications and cardiac medications after a recent surgical procedure.      * No surgery found *      Hospital Course:   71-year-old woman prior medical history of recent nephrectomy and systolic CHF who presents with bilateral lower extremity edema, dyspnea, and weight gain who was found have acute on chronic systolic CHF exacerbation after her Entresto and diuretics were stopped after her nephrectomy 2 weeks ago.  Patient was found have acute kidney injury, prerenal etiology in the setting CHF exacerbation.  Patient was started on IV diuretics with improvement in her creatinine.  The patient was transitioned to p.o. torsemide.  She will discharge on many of her home medications, but Entresto and metformin will be held due to poor renal function.  She will follow-up as an outpatient for repeat lab draw in 1-2 weeks and reassess restarting her Entresto and metformin.  Patient was counseled at bedside and she acknowledges understanding.  Furthermore, she was encouraged to make a postoperative his appointment for her urologist, Dr. Ramirez.    Physical Exam  Vitals reviewed.   Constitutional:       General: She is not in acute distress.  Sitting in bed.  Conversant  HENT:      Head: Normocephalic.   Cardiovascular:      Rate and Rhythm: Normal rate and regular rhythm.      Pulses: Normal pulses.   Pulmonary:      Effort: Pulmonary effort is normal.      Comments:  Lungs clear to auscultation  bilaterally.  No wheeze or crackles.  Abdominal:      General: There is no distension.  Abdominal incisions are well approximated and healing well.  No erythema or purulence     Tenderness: There is no abdominal tenderness.   Skin:     General: Skin is warm.   Neurological:      General: No focal deficit present.  Moving all 4 extremities.  Cranial nerves 2-12 intact          Goals of Care Treatment Preferences:  Code Status: Full Code      Consults:   Consults (From admission, onward)        Status Ordering Provider     Inpatient consult to Registered Dietitian/Nutritionist  Once        Provider:  (Not yet assigned)    Completed SUMEET ROCK     Inpatient consult to Internal Medicine  Once        Provider:  Dean Glass MD    Acknowledged CADY SEN     Inpatient consult to Nephrology  Once        Provider:  Paolo Martinez MD    Completed CADY SEN          No new Assessment & Plan notes have been filed under this hospital service since the last note was generated.  Service: Hospital Medicine    Final Active Diagnoses:    Diagnosis Date Noted POA    PRINCIPAL PROBLEM:  Acute on chronic systolic CHF (congestive heart failure) [I50.23] 11/20/2021 Yes    Paroxysmal atrial fibrillation [I48.0] 12/10/2021 Yes    Hypothyroidism [E03.9] 03/01/2019 Yes    Coronary artery disease [I25.10] 08/31/2017 Yes    Diabetes mellitus [E11.9] 08/31/2017 Yes      Problems Resolved During this Admission:       Discharged Condition: good    Disposition: Home or Self Care    Follow Up:   Follow-up Information     Paolo Martinez MD. Schedule an appointment as soon as possible for a visit on 3/21/2022.    Specialty: Nephrology  Why: Please call Dr. Martinez's office in the morning to make an appointment for the next 1-2 weeks.  Contact information:  [address] [place] [address] [facility]  [facility] [address]  [phone]             Rolando Ramirez MD. Schedule an appointment as soon as possible for  a visit.    Specialty: Urology  Why: Please make a postoperative visit appointment with Dr. Ramirez for a wound check.  Contact information:  [address] [address] UROLOGY  [facility] [address] [phone]                       Patient Instructions:      Diet renal     Activity as tolerated       Significant Diagnostic Studies: Labs:   CMP   Recent Labs   Lab 03/19/22  0740 03/20/22  0540    139   K 4.2 4.1    103   CO2 25 26   * 115*   BUN 62* 63*   CREATININE 3.3* 3.0*   CALCIUM 9.8 9.3   ANIONGAP 11 10   ESTGFRAFRICA 15.7* 17.6*   EGFRNONAA 13.6* 15.3*       Pending Diagnostic Studies:     None         Medications:  Reconciled Home Medications:      Medication List      START taking these medications    magnesium oxide 400 mg (241.3 mg magnesium) tablet  Commonly known as: MAG-OX  Take 1 tablet (400 mg total) by mouth daily as needed (cramping).     torsemide 20 MG Tab  Commonly known as: DEMADEX  Take 1 tablet (20 mg total) by mouth once daily.  Start taking on: March 21, 2022        CHANGE how you take these medications    allopurinoL 100 MG tablet  Commonly known as: ZYLOPRIM  Take 1 tablet (100 mg total) by mouth once daily.  What changed: how much to take     levothyroxine 50 MCG tablet  Commonly known as: SYNTHROID  TAKE ONE TABLET BY MOUTH EVERY MORNING ON an empty stomach  What changed: when to take this     montelukast 10 mg tablet  Commonly known as: SINGULAIR  TAKE ONE TABLET BY MOUTH EVERY EVENING FOR allergies  What changed: See the new instructions.        CONTINUE taking these medications    albuterol 90 mcg/actuation inhaler  Commonly known as: PROVENTIL/VENTOLIN HFA  Inhale 1-2 puffs into the lungs every 6 (six) hours as needed for Wheezing or Shortness of Breath. Rescue     amiodarone 100 MG Tab  Commonly known as: PACERONE  Take 1 tablet (100 mg total) by mouth once daily.     amLODIPine 5 MG tablet  Commonly known as: NORVASC  Take 2 tablets (10 mg total) by mouth  nightly.     apixaban 5 mg Tab  Commonly known as: ELIQUIS  Take 1 tablet (5 mg total) by mouth 2 (two) times daily.     bisacodyL 10 mg Supp  Commonly known as: DULCOLAX  Place 1 suppository (10 mg total) rectally 2 (two) times daily as needed (constipation).     blood sugar diagnostic Strp  To check BG2 times daily,and PRN hypoglycemia  to use with insurance preferred meter Diagnosis Code: E11.65     carvediloL 25 MG tablet  Commonly known as: COREG  TAKE ONE TABLET BY MOUTH TWICE DAILY     dextromethorphan-guaiFENesin 60-1,200 mg per 12 hr tablet  Commonly known as: MUCINEX DM  Take 1 tablet by mouth 2 (two) times daily as needed (chest congestion).     docusate sodium 100 MG capsule  Commonly known as: COLACE  Take 1 capsule (100 mg total) by mouth once daily.     HYDROcodone-acetaminophen  mg per tablet  Commonly known as: NORCO  Take 1 tablet by mouth every 6 (six) hours as needed for Pain.     nitroGLYCERIN 0.4 MG SL tablet  Commonly known as: NITROSTAT  Place 1 tablet (0.4 mg total) under the tongue every 5 (five) minutes as needed for Chest pain.     polyethylene glycol 17 gram/dose powder  Commonly known as: GLYCOLAX  Take 17 g by mouth 2 (two) times daily.     PRALUENT PEN 75 mg/mL Pnij  Generic drug: alirocumab  Inject 1 mL (75 mg total) into the skin every 14 (fourteen) days.        STOP taking these medications    aspirin 81 MG EC tablet  Commonly known as: ECOTRIN     DULoxetine 20 MG capsule  Commonly known as: CYMBALTA     ENTRESTO 49-51 mg per tablet  Generic drug: sacubitriL-valsartan     folic acid 1 MG tablet  Commonly known as: FOLVITE     furosemide 40 MG tablet  Commonly known as: LASIX     hydrALAZINE 50 MG tablet  Commonly known as: APRESOLINE     lancets Misc     metFORMIN 500 MG ER 24hr tablet  Commonly known as: GLUCOPHAGE-XR     sodium bicarbonate 650 MG tablet            Indwelling Lines/Drains at time of discharge:   Lines/Drains/Airways     None                 Time spent on  the discharge of patient: 44 minutes         Terence Sterling MD  Department of Hospital Medicine  [facility]

## 2022-03-22 LAB
BACTERIA BLD CULT: NORMAL
BACTERIA BLD CULT: NORMAL

## 2022-03-24 ENCOUNTER — PATIENT MESSAGE (OUTPATIENT)
Dept: ADMINISTRATIVE | Facility: HOSPITAL | Age: 72
End: 2022-03-24
Payer: MEDICARE

## 2022-03-24 ENCOUNTER — PATIENT OUTREACH (OUTPATIENT)
Dept: ADMINISTRATIVE | Facility: HOSPITAL | Age: 72
End: 2022-03-24
Payer: MEDICARE

## 2022-03-24 NOTE — PROGRESS NOTES
2022 Care Everywhere updates requested and reviewed.  Immunizations unable to be reconciled-pt data not found. Media reports reviewed.  Duplicate HM overrides and  orders removed.  Overdue HM topic chart audit and/or requested.  Overdue lab testing linked to upcoming lab appointments if applies.  Lab radha, and [email or web address] reviewed    Lab testing     DIS reviewed       DEXA      Health Maintenance Due   Topic Date Due    COVID-19 Vaccine (1) Never done    Pneumococcal Vaccines (Age 65+) (1 of 4 - PCV13) Never done    TETANUS VACCINE  Never done    High Dose Statin  Never done    Shingles Vaccine (1 of 2) Never done    Foot Exam  2021    Influenza Vaccine (1) 2021    Eye Exam  10/09/2021    DEXA Scan  2022

## 2022-03-24 NOTE — LETTER
March 31, 2022    Juhi Taylor  [address] [place] [address]             [facility]  [address]  [place] [address]  Phone: [phone] Dear Veronica [facility] is committed to your overall health.  To help you get the most out of each of your visits, we will review your information to make sure you are up to date on all of your recommended tests and/or procedures.       Tony Sadler MD  has found that your chart shows you may be due for :     Health Maintenance Due   Topic    COVID-19 Vaccine     Pneumococcal Vaccines    TETANUS VACCINE     Shingles Vaccine    Foot Exam     Influenza Vaccine     Eye Exam     DEXA Scan          If you have had any of the above done at another facility, please bring the records or information with you so that your record at [facility] will be complete.  If you would like to schedule any of these test, please call [phone] or send a message via [email or web address].        If you are currently taking medication, please bring it with you to your appointment for review.           Thank you for letting us care for you,        Tony Sadler MD and your [facility] Team

## 2022-03-28 RX ORDER — CARVEDILOL 25 MG/1
TABLET ORAL
Qty: 180 TABLET | Refills: 2 | Status: SHIPPED | OUTPATIENT
Start: 2022-03-28 | End: 2023-03-07 | Stop reason: SDUPTHER

## 2022-03-28 NOTE — TELEPHONE ENCOUNTER
This Rx Request does not qualify for assessment with the OR   Please review protocol details and the Care Due Message for extra clinical information    Reasons Rx Request may be deferred:  Labs/Vitals abnormal  Patient has been seen in the ED/Hospital since the last PCP visit    Note composed:5:10 PM 03/28/2022

## 2022-03-28 NOTE — TELEPHONE ENCOUNTER
No new care gaps identified.  Powered by [employer]. Reference number: [id number].   3/28/2022 9:21:10 AM CDT

## 2022-04-07 ENCOUNTER — OFFICE VISIT (OUTPATIENT)
Dept: FAMILY MEDICINE | Facility: CLINIC | Age: 72
End: 2022-04-07
Payer: MEDICARE

## 2022-04-07 ENCOUNTER — LAB VISIT (OUTPATIENT)
Dept: LAB | Facility: HOSPITAL | Age: 72
End: 2022-04-07
Payer: MEDICARE

## 2022-04-07 VITALS
OXYGEN SATURATION: 98 % | BODY MASS INDEX: 43.43 KG/M2 | HEART RATE: 56 BPM | DIASTOLIC BLOOD PRESSURE: 75 MMHG | HEIGHT: 67 IN | SYSTOLIC BLOOD PRESSURE: 136 MMHG | WEIGHT: 276.69 LBS

## 2022-04-07 DIAGNOSIS — M10.9 GOUT, UNSPECIFIED: Primary | ICD-10-CM

## 2022-04-07 DIAGNOSIS — C64.1 RENAL CELL CARCINOMA OF RIGHT KIDNEY: ICD-10-CM

## 2022-04-07 DIAGNOSIS — N18.4 CKD STAGE 4 SECONDARY TO HYPERTENSION: ICD-10-CM

## 2022-04-07 DIAGNOSIS — E55.9 AVITAMINOSIS D: ICD-10-CM

## 2022-04-07 DIAGNOSIS — E11.29 TYPE 2 DIABETES MELLITUS WITH MICROALBUMINURIA, WITHOUT LONG-TERM CURRENT USE OF INSULIN: ICD-10-CM

## 2022-04-07 DIAGNOSIS — Z09 HOSPITAL DISCHARGE FOLLOW-UP: Primary | ICD-10-CM

## 2022-04-07 DIAGNOSIS — E61.1 IRON DEFICIENCY: ICD-10-CM

## 2022-04-07 DIAGNOSIS — N18.2 CHRONIC KIDNEY DISEASE, STAGE II (MILD): ICD-10-CM

## 2022-04-07 DIAGNOSIS — I13.0 BENIGN HYPERTENSIVE HEART AND KIDNEY DISEASE WITH CHF, NYHA CLASS 2 AND CKD STAGE 4: ICD-10-CM

## 2022-04-07 DIAGNOSIS — N18.4 BENIGN HYPERTENSIVE HEART AND KIDNEY DISEASE WITH CHF, NYHA CLASS 2 AND CKD STAGE 4: ICD-10-CM

## 2022-04-07 DIAGNOSIS — I12.9 CKD STAGE 4 SECONDARY TO HYPERTENSION: ICD-10-CM

## 2022-04-07 DIAGNOSIS — Z90.5 H/O RIGHT NEPHRECTOMY: ICD-10-CM

## 2022-04-07 DIAGNOSIS — R80.9 TYPE 2 DIABETES MELLITUS WITH MICROALBUMINURIA, WITHOUT LONG-TERM CURRENT USE OF INSULIN: ICD-10-CM

## 2022-04-07 LAB
ALBUMIN SERPL BCP-MCNC: 3.4 G/DL (ref 3.5–5.2)
ALBUMIN/CREAT UR: 573.8 UG/MG (ref 0–30)
ANION GAP SERPL CALC-SCNC: 10 MMOL/L (ref 8–16)
BACTERIA #/AREA URNS AUTO: NORMAL /HPF
BUN SERPL-MCNC: 54 MG/DL (ref 8–23)
CALCIUM SERPL-MCNC: 10.7 MG/DL (ref 8.7–10.5)
CHLORIDE SERPL-SCNC: 104 MMOL/L (ref 95–110)
CO2 SERPL-SCNC: 29 MMOL/L (ref 23–29)
CREAT SERPL-MCNC: 3.2 MG/DL (ref 0.5–1.4)
CREAT UR-MCNC: 42 MG/DL (ref 15–325)
EST. GFR  (AFRICAN AMERICAN): 16.3 ML/MIN/1.73 M^2
EST. GFR  (NON AFRICAN AMERICAN): 14.1 ML/MIN/1.73 M^2
GLUCOSE SERPL-MCNC: 114 MG/DL (ref 70–110)
HYALINE CASTS UR QL AUTO: 0 /LPF
MICROALBUMIN UR DL<=1MG/L-MCNC: 241 UG/ML
MICROSCOPIC COMMENT: NORMAL
PHOSPHATE SERPL-MCNC: 4 MG/DL (ref 2.7–4.5)
POTASSIUM SERPL-SCNC: 4.5 MMOL/L (ref 3.5–5.1)
PTH-INTACT SERPL-MCNC: 198.7 PG/ML (ref 9–77)
RBC #/AREA URNS AUTO: 1 /HPF (ref 0–4)
SODIUM SERPL-SCNC: 143 MMOL/L (ref 136–145)
SQUAMOUS #/AREA URNS AUTO: 0 /HPF
URATE SERPL-MCNC: 6.7 MG/DL (ref 2.4–5.7)
WBC #/AREA URNS AUTO: 1 /HPF (ref 0–5)

## 2022-04-07 PROCEDURE — 84550 ASSAY OF BLOOD/URIC ACID: CPT | Performed by: INTERNAL MEDICINE

## 2022-04-07 PROCEDURE — 81001 URINALYSIS AUTO W/SCOPE: CPT | Performed by: INTERNAL MEDICINE

## 2022-04-07 PROCEDURE — 99999 PR PBB SHADOW E&M-EST. PATIENT-LVL IV: ICD-10-PCS | Mod: PBBFAC,,, | Performed by: INTERNAL MEDICINE

## 2022-04-07 PROCEDURE — 82043 UR ALBUMIN QUANTITATIVE: CPT | Performed by: INTERNAL MEDICINE

## 2022-04-07 PROCEDURE — 85025 COMPLETE CBC W/AUTO DIFF WBC: CPT | Performed by: INTERNAL MEDICINE

## 2022-04-07 PROCEDURE — 82570 ASSAY OF URINE CREATININE: CPT | Performed by: INTERNAL MEDICINE

## 2022-04-07 PROCEDURE — 83970 ASSAY OF PARATHORMONE: CPT | Performed by: INTERNAL MEDICINE

## 2022-04-07 PROCEDURE — 99214 OFFICE O/P EST MOD 30 MIN: CPT | Mod: PBBFAC,PN | Performed by: INTERNAL MEDICINE

## 2022-04-07 PROCEDURE — 99999 PR PBB SHADOW E&M-EST. PATIENT-LVL IV: CPT | Mod: PBBFAC,,, | Performed by: INTERNAL MEDICINE

## 2022-04-07 PROCEDURE — 99214 OFFICE O/P EST MOD 30 MIN: CPT | Mod: S$PBB,,, | Performed by: INTERNAL MEDICINE

## 2022-04-07 PROCEDURE — 80069 RENAL FUNCTION PANEL: CPT | Performed by: INTERNAL MEDICINE

## 2022-04-07 PROCEDURE — 99214 PR OFFICE/OUTPT VISIT, EST, LEVL IV, 30-39 MIN: ICD-10-PCS | Mod: S$PBB,,, | Performed by: INTERNAL MEDICINE

## 2022-04-07 RX ORDER — PROMETHAZINE HYDROCHLORIDE AND DEXTROMETHORPHAN HYDROBROMIDE 6.25; 15 MG/5ML; MG/5ML
5 SYRUP ORAL EVERY 8 HOURS PRN
Qty: 100 ML | Refills: 0 | Status: SHIPPED | OUTPATIENT
Start: 2022-04-07 | End: 2022-04-08

## 2022-04-07 RX ORDER — BENZONATATE 100 MG/1
100-200 CAPSULE ORAL 3 TIMES DAILY PRN
Qty: 30 CAPSULE | Refills: 0 | Status: SHIPPED | OUTPATIENT
Start: 2022-04-07 | End: 2022-04-17

## 2022-04-07 NOTE — PROGRESS NOTES
Subjective:   Note in      Patient ID: Juhi Taylor is a 71 y.o. female.    Gyn:  Hysterectomy partical & 1 ovary   MMG:  History of breast cancer.  3/2021 MRI breast done negative  Dexa:  02/2019 osteopenia  Colonoscopy:   yes 2014 or later Dr Payne    Immunizations: Flu:  Refuses Tdap:  Not sure Pneumovax:  Refused Prevnar 13:  Refused Shingles: Covid: defers recommend w medical hx   Smoker:  Former   Eye: [facility]  Dr [facility]     Chief Complaint: Follow-up (After sx follow up march 10 removal of right kidney)    Follow-up  Associated symptoms include arthralgias, fatigue and myalgias. Pertinent negatives include no chest pain or joint swelling.   Joint Pain  This is a chronic problem. The current episode started more than 1 month ago. Associated symptoms include arthralgias, fatigue and myalgias. Pertinent negatives include no chest pain or joint swelling.       Hospital follow up   Had nephrectomy + kidney cancer.  GFR dropped to stage 4 p procedure. Has seen and being followed by Renal. Schedule to due labs today.  Had episode of heart failure with volume over load after d/c due to meds held or stopped.  Was readmitted for mgmt.  Still c/o of some sob but improved.       A Fib: dx 2021:  Stable Rx Eliquis, ASA 81, Amiodarone 100 -- mgmt w Cardio      Reactive depression: off meds - doing ok right now   Prev tired the Cymbalta few days bad nausea stopped.   CAD: stable. No CP// hx 3 stents Dec 2020. Off Plavix //Dr Kern      CHF/pulmonary hypertension:  Pressure 51, EF 35-40%, DD grade 2.  + chronic shortness of breath reduced exercise tolerance. // Rx Entresto, Lasix.   Type 2 DM:  A1c 7.0 this am 110 //off Metformin since hospital and reduced Gfr will monitor.  //see's Endo Rx; Sara.   SE: diarrhea metformin   HTN:  controlled, multiple meds in the past taking amlodipine 10 causing leg swelling.  Rx Lasix at home prefers torsemide. .  HLD:  Controlled; Goal <70// Rx Praluent   CKD stage 4  "down to after nephrectomy + microalbumin positive:  GFR 43- down to 17 after hospital // prev mgmt Renal [place] Dr Martinez   been while.    Anemia chronic disease:  hb 10.9 iron Sat 12  -taking iron daily.    Elevated  hx   Hypothyroid: controlled Rx Levo 50   Vitamin-D def: missing doses Vit D2   Sleep Apnea: + CPAP/ using every day all night and w naps. Still working on diff masks that she likes.   Sleep testing: AHI 43 - auto bipap// 1/16/18: AHI 37.9 auto CPAP 8-20 mmHg .   Osteoarthritis multi joints: + leg pains, knees hips lower back. Diff walking. Sciatic right and radiation to hip _Gap helped a little - .  Gout: Rx 300 mg every other day.  Rx Allopurinol 300. Highest 8.      Review of Systems:  Review of Systems   Constitutional: Positive for fatigue. Negative for appetite change.   HENT: Negative for nosebleeds.    Eyes: Negative for pain.   Respiratory: Negative for choking.    Cardiovascular: Negative for chest pain.   Gastrointestinal: Negative for blood in stool.   Genitourinary: Negative for hematuria.   Musculoskeletal: Positive for arthralgias and myalgias. Negative for joint swelling.   Skin: Negative for pallor.   Neurological: Negative for facial asymmetry.   Hematological: Does not bruise/bleed easily.   Psychiatric/Behavioral: Positive for dysphoric mood. Negative for confusion and sleep disturbance.       Objective:     Vitals:    04/07/22 1436   BP: 136/75   Pulse: (!) 56   SpO2: 98%   Weight: 125.5 kg (276 lb 10.8 oz)   Height: 5' 7" (1.702 m)          Physical Exam  Vitals reviewed.   Constitutional:       Appearance: Normal appearance. She is obese.   HENT:      Head: Normocephalic and atraumatic.      Mouth/Throat:      Pharynx: Oropharynx is clear.   Eyes:      Extraocular Movements: Extraocular movements intact.      Conjunctiva/sclera: Conjunctivae normal.      Pupils: Pupils are equal, round, and reactive to light.   Cardiovascular:      Rate and Rhythm: Normal rate and " regular rhythm.      Heart sounds: Murmur heard.   Pulmonary:      Effort: Pulmonary effort is normal.      Breath sounds: Normal breath sounds.   Abdominal:      General: Bowel sounds are normal.      Palpations: Abdomen is soft.      Comments: RLQ incision healing    Musculoskeletal:         General: Normal range of motion.      Cervical back: Normal range of motion and neck supple.      Right lower leg: No edema.      Left lower leg: No edema.   Skin:     General: Skin is warm and dry.   Neurological:      General: No focal deficit present.      Mental Status: She is alert and oriented to person, place, and time.   Psychiatric:         Mood and Affect: Mood normal.         Medication List with Changes/Refills   New Medications    BENZONATATE (TESSALON) 100 MG CAPSULE    Take 1-2 capsules (100-200 mg total) by mouth 3 (three) times daily as needed for Cough.   Current Medications    ALBUTEROL (PROVENTIL/VENTOLIN HFA) 90 MCG/ACTUATION INHALER    Inhale 1-2 puffs into the lungs every 6 (six) hours as needed for Wheezing or Shortness of Breath. Rescue    ALLOPURINOL (ZYLOPRIM) 100 MG TABLET    Take 1 tablet (100 mg total) by mouth once daily.    AMIODARONE (PACERONE) 100 MG TAB    Take 1 tablet (100 mg total) by mouth once daily.    AMLODIPINE (NORVASC) 5 MG TABLET    Take 2 tablets (10 mg total) by mouth nightly.    BISACODYL (DULCOLAX) 10 MG SUPP    Place 1 suppository (10 mg total) rectally 2 (two) times daily as needed (constipation).    BLOOD SUGAR DIAGNOSTIC STRP    To check BG2 times daily,and PRN hypoglycemia  to use with insurance preferred meter Diagnosis Code: E11.65    CARVEDILOL (COREG) 25 MG TABLET    TAKE ONE TABLET BY MOUTH TWICE DAILY    DOCUSATE SODIUM (COLACE) 100 MG CAPSULE    Take 1 capsule (100 mg total) by mouth once daily.    ELIQUIS 5 MG TAB    TAKE ONE TABLET BY MOUTH TWICE DAILY    HYDROCODONE-ACETAMINOPHEN (NORCO)  MG PER TABLET    Take 1 tablet by mouth every 6 (six) hours as needed  for Pain.    LEVOTHYROXINE (SYNTHROID) 50 MCG TABLET    TAKE ONE TABLET BY MOUTH EVERY MORNING ON an empty stomach    MAGNESIUM OXIDE (MAG-OX) 400 MG (241.3 MG MAGNESIUM) TABLET    Take 1 tablet (400 mg total) by mouth daily as needed (cramping).    MONTELUKAST (SINGULAIR) 10 MG TABLET    TAKE ONE TABLET BY MOUTH EVERY EVENING FOR allergies    NITROGLYCERIN (NITROSTAT) 0.4 MG SL TABLET    Place 1 tablet (0.4 mg total) under the tongue every 5 (five) minutes as needed for Chest pain.    POLYETHYLENE GLYCOL (GLYCOLAX) 17 GRAM/DOSE POWDER    Take 17 g by mouth 2 (two) times daily.    PRALUENT PEN 75 MG/ML PNIJ    Inject 1 mL (75 mg total) into the skin every 14 (fourteen) days.    TORSEMIDE (DEMADEX) 20 MG TAB    Take 1 tablet (20 mg total) by mouth once daily.       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  Dispense: 100 mL; Refill: 0  -     benzonatate (TESSALON) 100 MG capsule; Take 1-2 capsules (100-200 mg total) by mouth 3 (three) times daily as needed for Cough.  Dispense: 30 capsule; Refill: 0  -     Discontinue: simethicone 125 mg Tab; Take 1 tablet by mouth 2 (two) times daily.  Dispense: 60 tablet; Refill: 0        Continue to work on regular exercise, maintain healthy weight, balanced diet. Avoid unhealthy habits: smoking, excessive alcohol intake.

## 2022-04-08 ENCOUNTER — HOSPITAL ENCOUNTER (OUTPATIENT)
Facility: HOSPITAL | Age: 72
Discharge: HOME | End: 2022-04-09
Attending: EMERGENCY MEDICINE | Admitting: INTERNAL MEDICINE
Payer: MEDICARE

## 2022-04-08 DIAGNOSIS — R06.02 SHORTNESS OF BREATH: ICD-10-CM

## 2022-04-08 DIAGNOSIS — R07.9 CHEST PAIN: ICD-10-CM

## 2022-04-08 PROBLEM — I50.43 ACUTE ON CHRONIC COMBINED SYSTOLIC AND DIASTOLIC HEART FAILURE: Status: ACTIVE | Noted: 2021-12-10

## 2022-04-08 PROBLEM — J18.9 PNEUMONIA: Status: ACTIVE | Noted: 2022-04-08

## 2022-04-08 LAB
ALBUMIN SERPL BCP-MCNC: 3.7 G/DL (ref 3.5–5.2)
ALP SERPL-CCNC: 113 U/L (ref 55–135)
ALT SERPL W/O P-5'-P-CCNC: 29 U/L (ref 10–44)
ANION GAP SERPL CALC-SCNC: 14 MMOL/L (ref 8–16)
AST SERPL-CCNC: 20 U/L (ref 10–40)
BASOPHILS # BLD AUTO: 0.02 K/UL (ref 0–0.2)
BASOPHILS # BLD AUTO: 0.02 K/UL (ref 0–0.2)
BASOPHILS NFR BLD: 0.3 % (ref 0–1.9)
BASOPHILS NFR BLD: 0.3 % (ref 0–1.9)
BILIRUB SERPL-MCNC: 0.5 MG/DL (ref 0.1–1)
BNP SERPL-MCNC: 807 PG/ML (ref 0–99)
BUN SERPL-MCNC: 56 MG/DL (ref 8–23)
CALCIUM SERPL-MCNC: 9.9 MG/DL (ref 8.7–10.5)
CHLORIDE SERPL-SCNC: 101 MMOL/L (ref 95–110)
CO2 SERPL-SCNC: 24 MMOL/L (ref 23–29)
CREAT SERPL-MCNC: 3 MG/DL (ref 0.5–1.4)
DIFFERENTIAL METHOD: ABNORMAL
DIFFERENTIAL METHOD: ABNORMAL
EOSINOPHIL # BLD AUTO: 0.2 K/UL (ref 0–0.5)
EOSINOPHIL # BLD AUTO: 0.3 K/UL (ref 0–0.5)
EOSINOPHIL NFR BLD: 2.9 % (ref 0–8)
EOSINOPHIL NFR BLD: 4 % (ref 0–8)
ERYTHROCYTE [DISTWIDTH] IN BLOOD BY AUTOMATED COUNT: 17.3 % (ref 11.5–14.5)
ERYTHROCYTE [DISTWIDTH] IN BLOOD BY AUTOMATED COUNT: 17.5 % (ref 11.5–14.5)
EST. GFR  (AFRICAN AMERICAN): 17.6 ML/MIN/1.73 M^2
EST. GFR  (NON AFRICAN AMERICAN): 15.3 ML/MIN/1.73 M^2
GLUCOSE SERPL-MCNC: 242 MG/DL (ref 70–110)
HCT VFR BLD AUTO: 31.8 % (ref 37–48.5)
HCT VFR BLD AUTO: 33.2 % (ref 37–48.5)
HGB BLD-MCNC: 9.4 G/DL (ref 12–16)
HGB BLD-MCNC: 9.4 G/DL (ref 12–16)
IMM GRANULOCYTES # BLD AUTO: 0.01 K/UL (ref 0–0.04)
IMM GRANULOCYTES # BLD AUTO: 0.02 K/UL (ref 0–0.04)
IMM GRANULOCYTES NFR BLD AUTO: 0.1 % (ref 0–0.5)
IMM GRANULOCYTES NFR BLD AUTO: 0.3 % (ref 0–0.5)
LYMPHOCYTES # BLD AUTO: 2.7 K/UL (ref 1–4.8)
LYMPHOCYTES # BLD AUTO: 2.8 K/UL (ref 1–4.8)
LYMPHOCYTES NFR BLD: 39.3 % (ref 18–48)
LYMPHOCYTES NFR BLD: 41.2 % (ref 18–48)
MCH RBC QN AUTO: 24.8 PG (ref 27–31)
MCH RBC QN AUTO: 24.9 PG (ref 27–31)
MCHC RBC AUTO-ENTMCNC: 28.3 G/DL (ref 32–36)
MCHC RBC AUTO-ENTMCNC: 29.6 G/DL (ref 32–36)
MCV RBC AUTO: 84 FL (ref 82–98)
MCV RBC AUTO: 88 FL (ref 82–98)
MONOCYTES # BLD AUTO: 0.5 K/UL (ref 0.3–1)
MONOCYTES # BLD AUTO: 0.5 K/UL (ref 0.3–1)
MONOCYTES NFR BLD: 7.5 % (ref 4–15)
MONOCYTES NFR BLD: 7.6 % (ref 4–15)
NEUTROPHILS # BLD AUTO: 3.1 K/UL (ref 1.8–7.7)
NEUTROPHILS # BLD AUTO: 3.6 K/UL (ref 1.8–7.7)
NEUTROPHILS NFR BLD: 46.6 % (ref 38–73)
NEUTROPHILS NFR BLD: 49.9 % (ref 38–73)
NRBC BLD-RTO: 0 /100 WBC
NRBC BLD-RTO: 0 /100 WBC
PLATELET # BLD AUTO: 313 K/UL (ref 150–450)
PLATELET # BLD AUTO: 380 K/UL (ref 150–450)
PMV BLD AUTO: 9.4 FL (ref 9.2–12.9)
PMV BLD AUTO: 9.7 FL (ref 9.2–12.9)
POTASSIUM SERPL-SCNC: 4.3 MMOL/L (ref 3.5–5.1)
PROT SERPL-MCNC: 7.5 G/DL (ref 6–8.4)
RBC # BLD AUTO: 3.77 M/UL (ref 4–5.4)
RBC # BLD AUTO: 3.79 M/UL (ref 4–5.4)
SARS-COV-2 RDRP RESP QL NAA+PROBE: NEGATIVE
SODIUM SERPL-SCNC: 139 MMOL/L (ref 136–145)
TROPONIN I SERPL DL<=0.01 NG/ML-MCNC: 0.06 NG/ML
WBC # BLD AUTO: 6.55 K/UL (ref 3.9–12.7)
WBC # BLD AUTO: 7.21 K/UL (ref 3.9–12.7)

## 2022-04-08 PROCEDURE — 93010 EKG 12-LEAD: ICD-10-PCS | Mod: ,,, | Performed by: GENERAL PRACTICE

## 2022-04-08 PROCEDURE — 87040 BLOOD CULTURE FOR BACTERIA: CPT | Performed by: NURSE PRACTITIONER

## 2022-04-08 PROCEDURE — 85025 COMPLETE CBC W/AUTO DIFF WBC: CPT | Performed by: EMERGENCY MEDICINE

## 2022-04-08 PROCEDURE — 93005 ELECTROCARDIOGRAM TRACING: CPT | Performed by: GENERAL PRACTICE

## 2022-04-08 PROCEDURE — 80053 COMPREHEN METABOLIC PANEL: CPT | Performed by: EMERGENCY MEDICINE

## 2022-04-08 PROCEDURE — 84484 ASSAY OF TROPONIN QUANT: CPT | Performed by: EMERGENCY MEDICINE

## 2022-04-08 PROCEDURE — G0378 HOSPITAL OBSERVATION PER HR: HCPCS

## 2022-04-08 PROCEDURE — 84145 PROCALCITONIN (PCT): CPT | Performed by: NURSE PRACTITIONER

## 2022-04-08 PROCEDURE — U0002 COVID-19 LAB TEST NON-CDC: HCPCS | Performed by: EMERGENCY MEDICINE

## 2022-04-08 PROCEDURE — 96375 TX/PRO/DX INJ NEW DRUG ADDON: CPT

## 2022-04-08 PROCEDURE — G0378 HOSPITAL OBSERVATION PER HR: HCPCS | Mod: CS

## 2022-04-08 PROCEDURE — 93010 ELECTROCARDIOGRAM REPORT: CPT | Mod: ,,, | Performed by: GENERAL PRACTICE

## 2022-04-08 PROCEDURE — 25000003 PHARM REV CODE 250: Performed by: NURSE PRACTITIONER

## 2022-04-08 PROCEDURE — 96365 THER/PROPH/DIAG IV INF INIT: CPT

## 2022-04-08 PROCEDURE — 83880 ASSAY OF NATRIURETIC PEPTIDE: CPT | Performed by: EMERGENCY MEDICINE

## 2022-04-08 PROCEDURE — 99285 EMERGENCY DEPT VISIT HI MDM: CPT | Mod: 25

## 2022-04-08 PROCEDURE — 63600175 PHARM REV CODE 636 W HCPCS: Performed by: NURSE PRACTITIONER

## 2022-04-08 RX ORDER — GLUCAGON 1 MG
1 KIT INJECTION
Status: DISCONTINUED | OUTPATIENT
Start: 2022-04-08 | End: 2022-04-09 | Stop reason: HOSPADM

## 2022-04-08 RX ORDER — FUROSEMIDE 10 MG/ML
40 INJECTION INTRAMUSCULAR; INTRAVENOUS
Status: DISCONTINUED | OUTPATIENT
Start: 2022-04-08 | End: 2022-04-09 | Stop reason: HOSPADM

## 2022-04-08 RX ORDER — MORPHINE SULFATE 2 MG/ML
2 INJECTION, SOLUTION INTRAMUSCULAR; INTRAVENOUS EVERY 4 HOURS PRN
Status: DISCONTINUED | OUTPATIENT
Start: 2022-04-08 | End: 2022-04-09 | Stop reason: HOSPADM

## 2022-04-08 RX ORDER — NAPROXEN SODIUM 220 MG/1
81 TABLET, FILM COATED ORAL DAILY
Status: DISCONTINUED | OUTPATIENT
Start: 2022-04-08 | End: 2022-04-09 | Stop reason: HOSPADM

## 2022-04-08 RX ORDER — BISACODYL 10 MG
10 SUPPOSITORY, RECTAL RECTAL DAILY PRN
Status: DISCONTINUED | OUTPATIENT
Start: 2022-04-08 | End: 2022-04-09 | Stop reason: HOSPADM

## 2022-04-08 RX ORDER — INSULIN ASPART 100 [IU]/ML
0-5 INJECTION, SOLUTION INTRAVENOUS; SUBCUTANEOUS
Status: DISCONTINUED | OUTPATIENT
Start: 2022-04-08 | End: 2022-04-09 | Stop reason: HOSPADM

## 2022-04-08 RX ORDER — SIMETHICONE 80 MG
1 TABLET,CHEWABLE ORAL 3 TIMES DAILY PRN
Status: DISCONTINUED | OUTPATIENT
Start: 2022-04-08 | End: 2022-04-09 | Stop reason: HOSPADM

## 2022-04-08 RX ORDER — LANOLIN ALCOHOL/MO/W.PET/CERES
400 CREAM (GRAM) TOPICAL DAILY PRN
Status: DISCONTINUED | OUTPATIENT
Start: 2022-04-08 | End: 2022-04-09 | Stop reason: HOSPADM

## 2022-04-08 RX ORDER — DOCUSATE SODIUM 100 MG/1
100 CAPSULE, LIQUID FILLED ORAL DAILY
Status: DISCONTINUED | OUTPATIENT
Start: 2022-04-09 | End: 2022-04-09 | Stop reason: HOSPADM

## 2022-04-08 RX ORDER — SODIUM CHLORIDE 0.9 % (FLUSH) 0.9 %
10 SYRINGE (ML) INJECTION
Status: DISCONTINUED | OUTPATIENT
Start: 2022-04-08 | End: 2022-04-09 | Stop reason: HOSPADM

## 2022-04-08 RX ORDER — FUROSEMIDE 10 MG/ML
60 INJECTION INTRAMUSCULAR; INTRAVENOUS ONCE
Status: COMPLETED | OUTPATIENT
Start: 2022-04-08 | End: 2022-04-09

## 2022-04-08 RX ORDER — ACETAMINOPHEN 325 MG/1
650 TABLET ORAL EVERY 4 HOURS PRN
Status: DISCONTINUED | OUTPATIENT
Start: 2022-04-08 | End: 2022-04-09 | Stop reason: HOSPADM

## 2022-04-08 RX ORDER — METOLAZONE 2.5 MG/1
2.5 TABLET ORAL DAILY
Status: DISCONTINUED | OUTPATIENT
Start: 2022-04-09 | End: 2022-04-09 | Stop reason: HOSPADM

## 2022-04-08 RX ORDER — AMLODIPINE BESYLATE 5 MG/1
10 TABLET ORAL NIGHTLY
Status: DISCONTINUED | OUTPATIENT
Start: 2022-04-08 | End: 2022-04-09 | Stop reason: HOSPADM

## 2022-04-08 RX ORDER — IBUPROFEN 200 MG
16 TABLET ORAL
Status: DISCONTINUED | OUTPATIENT
Start: 2022-04-08 | End: 2022-04-09 | Stop reason: HOSPADM

## 2022-04-08 RX ORDER — TORSEMIDE 20 MG/1
20 TABLET ORAL DAILY
Status: DISCONTINUED | OUTPATIENT
Start: 2022-04-09 | End: 2022-04-08

## 2022-04-08 RX ORDER — METOLAZONE 2.5 MG/1
2.5 TABLET ORAL DAILY
Status: ON HOLD | COMMUNITY
Start: 2022-03-25 | End: 2022-04-09 | Stop reason: SDUPTHER

## 2022-04-08 RX ORDER — ONDANSETRON 2 MG/ML
4 INJECTION INTRAMUSCULAR; INTRAVENOUS EVERY 8 HOURS PRN
Status: DISCONTINUED | OUTPATIENT
Start: 2022-04-08 | End: 2022-04-09 | Stop reason: HOSPADM

## 2022-04-08 RX ORDER — ALLOPURINOL 300 MG/1
300 TABLET ORAL EVERY OTHER DAY
Status: DISCONTINUED | OUTPATIENT
Start: 2022-04-09 | End: 2022-04-09 | Stop reason: HOSPADM

## 2022-04-08 RX ORDER — LEVOTHYROXINE SODIUM 25 UG/1
50 TABLET ORAL
Status: DISCONTINUED | OUTPATIENT
Start: 2022-04-09 | End: 2022-04-09 | Stop reason: HOSPADM

## 2022-04-08 RX ORDER — NITROGLYCERIN 0.4 MG/1
0.4 TABLET SUBLINGUAL EVERY 5 MIN PRN
Status: DISCONTINUED | OUTPATIENT
Start: 2022-04-08 | End: 2022-04-09 | Stop reason: HOSPADM

## 2022-04-08 RX ORDER — MONTELUKAST SODIUM 10 MG/1
10 TABLET ORAL NIGHTLY
Status: DISCONTINUED | OUTPATIENT
Start: 2022-04-08 | End: 2022-04-09 | Stop reason: HOSPADM

## 2022-04-08 RX ORDER — ALBUTEROL SULFATE 90 UG/1
2 AEROSOL, METERED RESPIRATORY (INHALATION) EVERY 6 HOURS PRN
Status: DISCONTINUED | OUTPATIENT
Start: 2022-04-08 | End: 2022-04-09 | Stop reason: HOSPADM

## 2022-04-08 RX ORDER — POLYETHYLENE GLYCOL 3350 17 G/17G
17 POWDER, FOR SOLUTION ORAL DAILY
Status: DISCONTINUED | OUTPATIENT
Start: 2022-04-09 | End: 2022-04-09 | Stop reason: HOSPADM

## 2022-04-08 RX ORDER — AMIODARONE HYDROCHLORIDE 100 MG/1
100 TABLET ORAL DAILY
Status: DISCONTINUED | OUTPATIENT
Start: 2022-04-09 | End: 2022-04-09 | Stop reason: HOSPADM

## 2022-04-08 RX ORDER — CARVEDILOL 25 MG/1
25 TABLET ORAL 2 TIMES DAILY WITH MEALS
Status: DISCONTINUED | OUTPATIENT
Start: 2022-04-09 | End: 2022-04-09 | Stop reason: HOSPADM

## 2022-04-08 RX ORDER — AZITHROMYCIN 250 MG/1
250 TABLET, FILM COATED ORAL DAILY
Status: DISCONTINUED | OUTPATIENT
Start: 2022-04-09 | End: 2022-04-09

## 2022-04-08 RX ORDER — BENZONATATE 100 MG/1
100 CAPSULE ORAL 3 TIMES DAILY PRN
Status: DISCONTINUED | OUTPATIENT
Start: 2022-04-08 | End: 2022-04-09 | Stop reason: HOSPADM

## 2022-04-08 RX ORDER — IBUPROFEN 200 MG
24 TABLET ORAL
Status: DISCONTINUED | OUTPATIENT
Start: 2022-04-08 | End: 2022-04-09 | Stop reason: HOSPADM

## 2022-04-08 RX ADMIN — CEFTRIAXONE SODIUM 1 G: 1 INJECTION, POWDER, FOR SOLUTION INTRAMUSCULAR; INTRAVENOUS at 10:04

## 2022-04-08 RX ADMIN — ASPIRIN 81 MG 81 MG: 81 TABLET ORAL at 10:04

## 2022-04-08 RX ADMIN — MORPHINE SULFATE 2 MG: 2 INJECTION, SOLUTION INTRAMUSCULAR; INTRAVENOUS at 09:04

## 2022-04-09 VITALS
BODY MASS INDEX: 43.32 KG/M2 | HEIGHT: 67 IN | SYSTOLIC BLOOD PRESSURE: 174 MMHG | TEMPERATURE: 98 F | OXYGEN SATURATION: 96 % | HEART RATE: 54 BPM | WEIGHT: 276 LBS | DIASTOLIC BLOOD PRESSURE: 81 MMHG | RESPIRATION RATE: 18 BRPM

## 2022-04-09 PROBLEM — J18.9 PNEUMONIA: Status: RESOLVED | Noted: 2022-04-08 | Resolved: 2022-04-09

## 2022-04-09 PROBLEM — I50.43 ACUTE ON CHRONIC COMBINED SYSTOLIC AND DIASTOLIC HEART FAILURE: Status: RESOLVED | Noted: 2021-12-10 | Resolved: 2022-04-09

## 2022-04-09 LAB
ANION GAP SERPL CALC-SCNC: 12 MMOL/L (ref 8–16)
BASOPHILS # BLD AUTO: 0.03 K/UL (ref 0–0.2)
BASOPHILS NFR BLD: 0.4 % (ref 0–1.9)
BUN SERPL-MCNC: 52 MG/DL (ref 8–23)
CALCIUM SERPL-MCNC: 9.9 MG/DL (ref 8.7–10.5)
CHLORIDE SERPL-SCNC: 102 MMOL/L (ref 95–110)
CO2 SERPL-SCNC: 27 MMOL/L (ref 23–29)
CREAT SERPL-MCNC: 2.9 MG/DL (ref 0.5–1.4)
DIFFERENTIAL METHOD: ABNORMAL
EOSINOPHIL # BLD AUTO: 0.2 K/UL (ref 0–0.5)
EOSINOPHIL NFR BLD: 3.2 % (ref 0–8)
ERYTHROCYTE [DISTWIDTH] IN BLOOD BY AUTOMATED COUNT: 17.1 % (ref 11.5–14.5)
EST. GFR  (AFRICAN AMERICAN): 18.3 ML/MIN/1.73 M^2
EST. GFR  (NON AFRICAN AMERICAN): 15.9 ML/MIN/1.73 M^2
GLUCOSE SERPL-MCNC: 116 MG/DL (ref 70–110)
GLUCOSE SERPL-MCNC: 121 MG/DL (ref 70–110)
GLUCOSE SERPL-MCNC: 227 MG/DL (ref 70–110)
HCT VFR BLD AUTO: 31.4 % (ref 37–48.5)
HGB BLD-MCNC: 9.2 G/DL (ref 12–16)
IMM GRANULOCYTES # BLD AUTO: 0.01 K/UL (ref 0–0.04)
IMM GRANULOCYTES NFR BLD AUTO: 0.1 % (ref 0–0.5)
LYMPHOCYTES # BLD AUTO: 3.3 K/UL (ref 1–4.8)
LYMPHOCYTES NFR BLD: 47.2 % (ref 18–48)
MCH RBC QN AUTO: 25 PG (ref 27–31)
MCHC RBC AUTO-ENTMCNC: 29.3 G/DL (ref 32–36)
MCV RBC AUTO: 85 FL (ref 82–98)
MONOCYTES # BLD AUTO: 0.6 K/UL (ref 0.3–1)
MONOCYTES NFR BLD: 8.8 % (ref 4–15)
NEUTROPHILS # BLD AUTO: 2.8 K/UL (ref 1.8–7.7)
NEUTROPHILS NFR BLD: 40.3 % (ref 38–73)
NRBC BLD-RTO: 0 /100 WBC
PLATELET # BLD AUTO: 310 K/UL (ref 150–450)
PMV BLD AUTO: 9.5 FL (ref 9.2–12.9)
POTASSIUM SERPL-SCNC: 3.9 MMOL/L (ref 3.5–5.1)
PROCALCITONIN SERPL IA-MCNC: <0.05 NG/ML (ref 0–0.5)
RBC # BLD AUTO: 3.68 M/UL (ref 4–5.4)
SODIUM SERPL-SCNC: 141 MMOL/L (ref 136–145)
TROPONIN I SERPL DL<=0.01 NG/ML-MCNC: 0.06 NG/ML
TROPONIN I SERPL DL<=0.01 NG/ML-MCNC: 0.06 NG/ML
WBC # BLD AUTO: 6.97 K/UL (ref 3.9–12.7)

## 2022-04-09 PROCEDURE — 85025 COMPLETE CBC W/AUTO DIFF WBC: CPT | Performed by: NURSE PRACTITIONER

## 2022-04-09 PROCEDURE — 99900035 HC TECH TIME PER 15 MIN (STAT)

## 2022-04-09 PROCEDURE — 25000003 PHARM REV CODE 250: Performed by: INTERNAL MEDICINE

## 2022-04-09 PROCEDURE — 99900031 HC PATIENT EDUCATION (STAT)

## 2022-04-09 PROCEDURE — 84484 ASSAY OF TROPONIN QUANT: CPT | Mod: 91 | Performed by: NURSE PRACTITIONER

## 2022-04-09 PROCEDURE — 96376 TX/PRO/DX INJ SAME DRUG ADON: CPT

## 2022-04-09 PROCEDURE — 36415 COLL VENOUS BLD VENIPUNCTURE: CPT | Performed by: NURSE PRACTITIONER

## 2022-04-09 PROCEDURE — G0378 HOSPITAL OBSERVATION PER HR: HCPCS | Mod: CS

## 2022-04-09 PROCEDURE — 25000003 PHARM REV CODE 250: Performed by: NURSE PRACTITIONER

## 2022-04-09 PROCEDURE — 96375 TX/PRO/DX INJ NEW DRUG ADDON: CPT

## 2022-04-09 PROCEDURE — 63600175 PHARM REV CODE 636 W HCPCS: Performed by: NURSE PRACTITIONER

## 2022-04-09 PROCEDURE — 80048 BASIC METABOLIC PNL TOTAL CA: CPT | Performed by: NURSE PRACTITIONER

## 2022-04-09 PROCEDURE — 63600175 PHARM REV CODE 636 W HCPCS: Performed by: INTERNAL MEDICINE

## 2022-04-09 RX ORDER — PANTOPRAZOLE SODIUM 40 MG/1
40 TABLET, DELAYED RELEASE ORAL DAILY
Qty: 14 TABLET | Refills: 0 | Status: SHIPPED | OUTPATIENT
Start: 2022-04-09 | End: 2022-08-19

## 2022-04-09 RX ORDER — TORSEMIDE 20 MG/1
20 TABLET ORAL DAILY
Qty: 30 TABLET | Refills: 0 | Status: SHIPPED | OUTPATIENT
Start: 2022-04-09 | End: 2022-11-09

## 2022-04-09 RX ORDER — PREDNISONE 20 MG/1
TABLET ORAL
Qty: 5 TABLET | Refills: 0 | Status: SHIPPED | OUTPATIENT
Start: 2022-04-09 | End: 2022-04-15

## 2022-04-09 RX ORDER — METOLAZONE 2.5 MG/1
2.5 TABLET ORAL DAILY
Qty: 30 TABLET | Refills: 0 | Status: SHIPPED | OUTPATIENT
Start: 2022-04-09 | End: 2022-11-09

## 2022-04-09 RX ORDER — SUCRALFATE 1 G/1
1 TABLET ORAL 4 TIMES DAILY
Qty: 28 TABLET | Refills: 0 | Status: SHIPPED | OUTPATIENT
Start: 2022-04-09 | End: 2022-04-16

## 2022-04-09 RX ORDER — LIDOCAINE 50 MG/G
1 PATCH TOPICAL
Status: DISCONTINUED | OUTPATIENT
Start: 2022-04-09 | End: 2022-04-09 | Stop reason: HOSPADM

## 2022-04-09 RX ORDER — LIDOCAINE 50 MG/G
1 PATCH TOPICAL DAILY
Qty: 5 PATCH | Refills: 0 | Status: SHIPPED | OUTPATIENT
Start: 2022-04-09 | End: 2022-04-14

## 2022-04-09 RX ORDER — FUROSEMIDE 10 MG/ML
60 INJECTION INTRAMUSCULAR; INTRAVENOUS ONCE
Status: COMPLETED | OUTPATIENT
Start: 2022-04-09 | End: 2022-04-09

## 2022-04-09 RX ORDER — METHYLPREDNISOLONE SOD SUCC 125 MG
80 VIAL (EA) INJECTION ONCE
Status: COMPLETED | OUTPATIENT
Start: 2022-04-09 | End: 2022-04-09

## 2022-04-09 RX ADMIN — DOCUSATE SODIUM 100 MG: 100 CAPSULE, LIQUID FILLED ORAL at 08:04

## 2022-04-09 RX ADMIN — FUROSEMIDE 60 MG: 10 INJECTION, SOLUTION INTRAVENOUS at 10:04

## 2022-04-09 RX ADMIN — ASPIRIN 81 MG 81 MG: 81 TABLET ORAL at 08:04

## 2022-04-09 RX ADMIN — LEVOTHYROXINE SODIUM 50 MCG: 0.03 TABLET ORAL at 05:04

## 2022-04-09 RX ADMIN — POLYETHYLENE GLYCOL 3350 17 G: 17 POWDER, FOR SOLUTION ORAL at 08:04

## 2022-04-09 RX ADMIN — AMLODIPINE BESYLATE 10 MG: 5 TABLET ORAL at 12:04

## 2022-04-09 RX ADMIN — MONTELUKAST 10 MG: 10 TABLET, FILM COATED ORAL at 12:04

## 2022-04-09 RX ADMIN — APIXABAN 5 MG: 5 TABLET, FILM COATED ORAL at 12:04

## 2022-04-09 RX ADMIN — AMIODARONE HYDROCHLORIDE 100 MG: 100 TABLET ORAL at 08:04

## 2022-04-09 RX ADMIN — NITROGLYCERIN 0.5 INCH: 20 OINTMENT TOPICAL at 12:04

## 2022-04-09 RX ADMIN — FUROSEMIDE 60 MG: 10 INJECTION, SOLUTION INTRAMUSCULAR; INTRAVENOUS at 12:04

## 2022-04-09 RX ADMIN — LIDOCAINE 1 PATCH: 50 PATCH TOPICAL at 12:04

## 2022-04-09 RX ADMIN — APIXABAN 5 MG: 5 TABLET, FILM COATED ORAL at 08:04

## 2022-04-09 RX ADMIN — FUROSEMIDE 40 MG: 10 INJECTION, SOLUTION INTRAMUSCULAR; INTRAVENOUS at 12:04

## 2022-04-09 RX ADMIN — METHYLPREDNISOLONE SODIUM SUCCINATE 80 MG: 125 INJECTION, POWDER, FOR SOLUTION INTRAMUSCULAR; INTRAVENOUS at 10:04

## 2022-04-09 RX ADMIN — CARVEDILOL 25 MG: 25 TABLET, FILM COATED ORAL at 08:04

## 2022-04-09 RX ADMIN — ALLOPURINOL 300 MG: 300 TABLET ORAL at 08:04

## 2022-04-09 RX ADMIN — METOLAZONE 2.5 MG: 2.5 TABLET ORAL at 08:04

## 2022-04-09 RX ADMIN — NITROGLYCERIN 0.5 INCH: 20 OINTMENT TOPICAL at 05:04

## 2022-04-09 NOTE — PLAN OF CARE
Plan of care, medications and safety reviewed with patient.      Problem: Adult Inpatient Plan of Care  Goal: Plan of Care Review  Outcome: Ongoing, Progressing  Goal: Patient-Specific Goal (Individualized)  Outcome: Ongoing, Progressing  Goal: Absence of Hospital-Acquired Illness or Injury  Outcome: Ongoing, Progressing  Goal: Optimal Comfort and Wellbeing  Outcome: Ongoing, Progressing  Goal: Readiness for Transition of Care  Outcome: Ongoing, Progressing     Problem: Bariatric Environmental Safety  Goal: Safety Maintained with Care  Outcome: Ongoing, Progressing     Problem: Diabetes Comorbidity  Goal: Blood Glucose Level Within Targeted Range  Outcome: Ongoing, Progressing     Problem: Fluid Imbalance (Pneumonia)  Goal: Fluid Balance  Outcome: Ongoing, Progressing     Problem: Infection (Pneumonia)  Goal: Resolution of Infection Signs and Symptoms  Outcome: Ongoing, Progressing     Problem: Respiratory Compromise (Pneumonia)  Goal: Effective Oxygenation and Ventilation  Outcome: Ongoing, Progressing     Problem: Fall Injury Risk  Goal: Absence of Fall and Fall-Related Injury  Outcome: Ongoing, Progressing

## 2022-04-09 NOTE — H&P
[facility] hospital - Emergency Dept  Hospital Medicine  History & Physical  Face to face encounter: 4/8/2022    Patient Name: Juhi Taylor  MRN: [id number]  Patient Class: OP- Observation  Admission Date: 4/8/2022  Attending Physician: Felipa Ybarra MD  Primary Care Provider: Tony Sadler MD         Patient information was obtained from patient, past medical records and ER records.     Subjective:     Principal Problem:Chest pain    Chief Complaint:   Chief Complaint   Patient presents with    Chest Pain     Chest pain with sob x 2 days.  Pain increases with exertion.  Took ntg 2 x today with some relief.         HPI: The patient is a 71-year-old  female with history of combined heart failure, diabetes mellitus, CKD, paroxysmal atrial fibrillation on Eliquis, CAD (followed by Dr. Libby Johnson), nonrheumatic mitral regurgitation, pulmonary hypertension, hypertension, hyperlipidemia - intolerant to statins, hypothyroidism, CKD (followed by Dr. Martinez), right renal carcinoma - status post right nephrectomy on 03/10/2022.  She presented to emergency department with chest pain.  Reports persistent severe pain under left breast, above left ribcage area, since yesterday morning.  Described pain as sharp, soreness, gas pain, uncomfortable sensation.  States that she took a total of 2 nitroglycerin sublingual with minimal improvement.  Also took simethicone with no improvements.  States that pain has been consistent but occasionally worse at times.  Reports occasional cough with yellow and green sputum since her nephrectomy last month.  Has been having frequent chills.  Denies more shortness of breath than usual.  Denies abdominal pain, nausea, vomiting, diarrhea.  Has constipation, especially with pain medication.      Workup in the ED showed elevated BNP, 800 and slightly elevated troponin at 0.060.  BUN creatinine are high but consistent to last month values. EKG, personally  reviewed, showed sinus rhythm with left axis deviation and T-wave inversion in lead 1 and aVL, similar to previous. Chest imaging showed Cardiomegaly and findings of mild pulmonary vessel congestion/trace edema.    Per my chart review:  Results for orders placed during the hospital encounter of 11/19/21    Echo    Interpretation Summary  · The estimated ejection fraction is 35-40%.  · The left ventricle is moderately enlarged with moderate concentric hypertrophy  · Severe left atrial enlargement.  · Normal right ventricular size with normal right ventricular systolic function.  · Mild-to-moderate mitral regurgitation.  · Mild tricuspid regurgitation.  · The estimated PA systolic pressure is 44 mmHg.  · There is pulmonary hypertension.    Results for orders placed during the hospital encounter of 09/16/21    Nuclear Stress - Cardiology Interpreted    Interpretation Summary    Abnormal myocardial perfusion scan.  No reversible ischemia.    There is a mild to moderate intensity, small to moderate sized, fixed defect consistent with scar in the inferobasilar wall(s).    The gated perfusion images showed an ejection fraction of 30% at rest.    The EKG portion of this study is negative for ischemia.    There were no arrhythmias during stress.              Past Medical History:   Diagnosis Date    Anticoagulant long-term use     Arthritis     Breast cancer 2014    invasive lobular carcinoma    Cancer of kidney 11/2020    RIGHT KIDNEY CANCER    CHF (congestive heart failure)     Coronary artery disease dx 2005    Depression     Diabetes mellitus     Diastolic heart failure secondary to hypertension     Gout     Hyperlipemia     Hypertension     Hypertrophy of nasal turbinates     Kidney mass 2020    Right    Levoscoliosis     Lung nodule     left    Multiple thyroid nodules     NICOLE (obstructive sleep apnea)     uses C-PAP    Pulmonary hypertension        Past Surgical History:   Procedure Laterality  "Date    AORTOGRAPHY N/A 12/04/2020    Procedure: Aortogram;  Surgeon: Paul Pedersen MD;  Location: [facility] CATH;  Service: Cardiology;  Laterality: N/A;    CARDIAC CATHETERIZATION  12/2020    CHOLECYSTECTOMY      COLONOSCOPY      multi -last 2014     ESOPHAGOGASTRODUODENOSCOPY      2012     HAND SURGERY Right     LAPAROSCOPIC ROBOT-ASSISTED SURGICAL REMOVAL OF KIDNEY USING DA CHELLE XI Right 3/10/2022    Procedure: XI ROBOTIC NEPHRECTOMY- radical;  Surgeon: Rolando Ramirez MD;  Location: [facility] OR;  Service: Urology;  Laterality: Right;    MASTECTOMY W/ SENTINEL NODE BIOPSY Bilateral 01/21/2015    bilateral "dog ears"    NASAL SINUS SURGERY  2015    Dr Bryant FESS/cauterization turbinate     PARTIAL HYSTERECTOMY  1989    PERCUTANEOUS TRANSLUMINAL BALLOON ANGIOPLASTY OF CORONARY ARTERY  12/04/2020    Procedure: Angioplasty-coronary;  Surgeon: Paul Pedersen MD;  Location: [facility] CATH;  Service: Cardiology;;    RENAL BIOPSY Right     9/20/2021 EJ    TUBAL LIGATION      ULTRASOUND GUIDANCE  12/04/2020    Procedure: Ultrasound Guidance;  Surgeon: Paul Pedersen MD;  Location: [facility] CATH;  Service: Cardiology;;       Review of patient's allergies indicates:   Allergen Reactions    Percocet [oxycodone-acetaminophen] Itching    Irbesartan Swelling    Januvia [sitagliptin]     Jardiance [empagliflozin]      Leg cramps    Lipitor [atorvastatin] Other (See Comments)     Severe leg pain    Linaclotide Other (See Comments) and Nausea And Vomiting     Does not remember    Lubiprostone Other (See Comments) and Palpitations     Does not remember       No current facility-administered medications on file prior to encounter.     Current Outpatient Medications on File Prior to Encounter   Medication Sig    allopurinoL (ZYLOPRIM) 100 MG tablet Take 1 tablet (100 mg total) by mouth once daily. (Patient taking differently: Take 300 mg by mouth every other day.)    amiodarone (PACERONE) 100 MG Tab Take 1 tablet (100 " mg total) by mouth once daily.    amLODIPine (NORVASC) 5 MG tablet Take 2 tablets (10 mg total) by mouth nightly.    carvediloL (COREG) 25 MG tablet TAKE ONE TABLET BY MOUTH TWICE DAILY (Patient taking differently: Take 25 mg by mouth 2 (two) times daily with meals.)    ELIQUIS 5 mg Tab TAKE ONE TABLET BY MOUTH TWICE DAILY (Patient taking differently: Take 5 mg by mouth 2 (two) times daily.)    levothyroxine (SYNTHROID) 50 MCG tablet TAKE ONE TABLET BY MOUTH EVERY MORNING ON an empty stomach (Patient taking differently: Take 50 mcg by mouth before breakfast.)    magnesium oxide (MAG-OX) 400 mg (241.3 mg magnesium) tablet Take 1 tablet (400 mg total) by mouth daily as needed (cramping).    metOLazone (ZAROXOLYN) 2.5 MG tablet Take 2.5 mg by mouth once daily.    montelukast (SINGULAIR) 10 mg tablet TAKE ONE TABLET BY MOUTH EVERY EVENING FOR allergies (Patient taking differently: Take 10 mg by mouth every evening.)    polyethylene glycol (GLYCOLAX) 17 gram/dose powder Take 17 g by mouth 2 (two) times daily.    torsemide (DEMADEX) 20 MG Tab Take 1 tablet (20 mg total) by mouth once daily.    albuterol (PROVENTIL/VENTOLIN HFA) 90 mcg/actuation inhaler Inhale 1-2 puffs into the lungs every 6 (six) hours as needed for Wheezing or Shortness of Breath. Rescue    benzonatate (TESSALON) 100 MG capsule Take 1-2 capsules (100-200 mg total) by mouth 3 (three) times daily as needed for Cough.    blood sugar diagnostic Strp To check BG2 times daily,and PRN hypoglycemia  to use with insurance preferred meter Diagnosis Code: E11.65    nitroGLYCERIN (NITROSTAT) 0.4 MG SL tablet Place 1 tablet (0.4 mg total) under the tongue every 5 (five) minutes as needed for Chest pain.    PRALUENT PEN 75 mg/mL PnIj Inject 1 mL (75 mg total) into the skin every 14 (fourteen) days.    [DISCONTINUED] aspirin (ECOTRIN) 81 MG EC tablet Take 1 tablet (81 mg total) by mouth once daily.    [DISCONTINUED] bisacodyL (DULCOLAX) 10 mg Supp  Place 1 suppository (10 mg total) rectally 2 (two) times daily as needed (constipation). (Patient not taking: No sig reported)    [DISCONTINUED] docusate sodium (COLACE) 100 MG capsule Take 1 capsule (100 mg total) by mouth once daily. (Patient not taking: No sig reported)    [DISCONTINUED] DULoxetine (CYMBALTA) 20 MG capsule TAKE ONE CAPSULE BY MOUTH ONCE DAILY    [DISCONTINUED] folic acid (FOLVITE) 1 MG tablet Take 1 mg by mouth once daily.    [DISCONTINUED] furosemide (LASIX) 40 MG tablet Take 1 tablet (40 mg total) by mouth once daily.    [DISCONTINUED] hydrALAZINE (APRESOLINE) 50 MG tablet Take 3 tablets (150 mg total) by mouth every 12 (twelve) hours. (Patient taking differently: Take 50 mg by mouth 3 (three) times daily.)    [DISCONTINUED] HYDROcodone-acetaminophen (NORCO)  mg per tablet Take 1 tablet by mouth every 6 (six) hours as needed for Pain.    [DISCONTINUED] metFORMIN (GLUCOPHAGE-XR) 500 MG ER 24hr tablet Take 2 tablets (1,000 mg total) by mouth 2 (two) times daily with meals.    [DISCONTINUED] promethazine-dextromethorphan (PROMETHAZINE-DM) 6.25-15 mg/5 mL Syrp Take 5 mLs by mouth every 8 (eight) hours as needed.    [DISCONTINUED] simethicone 125 mg Tab Take 1 tablet by mouth 2 (two) times daily.    [DISCONTINUED] sodium bicarbonate 650 MG tablet Take 1 tablet (650 mg total) by mouth once daily.     Family History       Problem Relation (Age of Onset)    Asthma Daughter    Birth defects Daughter    Breast cancer Mother, Maternal Aunt    Depression Daughter    Drug abuse Daughter, Daughter    Glaucoma Sister    Hepatitis Brother    Hypertension Father    Learning disabilities Daughter    Mental illness Daughter    Stroke Father          Tobacco Use    Smoking status: Former Smoker     Packs/day: 1.00     Years: 0.00     Pack years: 0.00     Types: Cigarettes     Quit date: 2016     Years since quittin.2    Smokeless tobacco: Never Used    Tobacco comment: quit     Substance and Sexual Activity    Alcohol use: No    Drug use: No    Sexual activity: Not Currently     Partners: Male     Review of Systems  Objective:     Vital Signs (Most Recent):  Temp: 98.1 °F (36.7 °C) (04/08/22 1902)  Pulse: (!) 57 (04/08/22 2100)  Resp: 20 (04/08/22 2148)  BP: (!) 170/77 (04/08/22 2100)  SpO2: 96 % (04/08/22 2100)   Vital Signs (24h Range):  Temp:  [98.1 °F (36.7 °C)] 98.1 °F (36.7 °C)  Pulse:  [57-68] 57  Resp:  [18-20] 20  SpO2:  [96 %-98 %] 96 %  BP: (170-199)/(76-86) 170/77     Weight: 125.2 kg (276 lb)  Body mass index is 43.23 kg/m².    Physical Exam  Constitutional:       Comments: Appears uncomfortable   HENT:      Head: Normocephalic and atraumatic.      Nose: Nose normal.      Mouth/Throat:      Mouth: Mucous membranes are moist.   Eyes:      Conjunctiva/sclera: Conjunctivae normal.   Cardiovascular:      Rate and Rhythm: Regular rhythm.      Pulses: Normal pulses.      Heart sounds: Murmur heard.   Pulmonary:      Effort: Pulmonary effort is normal.      Breath sounds: Rhonchi present.   Abdominal:      General: Abdomen is flat.      Palpations: Abdomen is soft.   Genitourinary:     General: Normal vulva.   Musculoskeletal:      Cervical back: Normal range of motion and neck supple.      Right lower leg: Edema present.      Left lower leg: Edema present.   Skin:     General: Skin is warm and dry.      Capillary Refill: Capillary refill takes less than 2 seconds.   Neurological:      General: No focal deficit present.      Mental Status: She is alert and oriented to person, place, and time.   Psychiatric:         Mood and Affect: Mood normal.         Behavior: Behavior normal.           Significant Labs: All pertinent labs within the past 24 hours have been reviewed.  CBC:   Recent Labs   Lab 04/07/22 1536 04/08/22 1947   WBC 6.55 7.21   HGB 9.4* 9.4*   HCT 33.2* 31.8*    313     CMP:   Recent Labs   Lab 04/07/22 1536 04/08/22 1947    139   K 4.5 4.3     101   CO2 29 24   * 242*   BUN 54* 56*   CREATININE 3.2* 3.0*   CALCIUM 10.7* 9.9   PROT  --  7.5   ALBUMIN 3.4* 3.7   BILITOT  --  0.5   ALKPHOS  --  113   AST  --  20   ALT  --  29   ANIONGAP 10 14   EGFRNONAA 14.1* 15.3*     Cardiac Markers:   Recent Labs   Lab 04/08/22 1947   *       Troponin:   Recent Labs   Lab 04/08/22 1947   TROPONINI 0.060*     Urine Studies:   Recent Labs   Lab 04/07/22  1536   RBCUA 1   WBCUA 1   BACTERIA Rare   SQUAMEPITHEL 0   HYALINECASTS 0       Significant Imaging: I have reviewed all pertinent imaging results/findings within the past 24 hours.  X-Ray Chest AP Portable    Result Date: 4/8/2022  CLINICAL HISTORY: 71 years (1950) Female CHF Chest pain, shortness of breath, cough TECHNIQUE: Portable AP radiograph the chest. COMPARISON: Radiograph from March 17, 2022. FINDINGS: Mildly increased central hilar interstitial opacities are seen bilaterally suggestive of either mild edema, atypical infection/pneumonia or bronchitis in the appropriate clinical setting. No consolidative pneumonia is seen.  No pneumothorax is identified. The cardiac silhouette is moderately enlarged.  Osseous structures appear unchanged. The visualized upper abdomen is unremarkable. IMPRESSION: Cardiomegaly and findings of mild pulmonary vessel congestion/trace edema. . Electronically signed by:  Jose Roberto Ojeda MD  4/8/2022 7:55 PM CDT Workstation: [id number]    X-Ray Chest AP Portable    Result Date: 3/17/2022  Reason: shortness of breath FINDINGS: PA and lateral chest with comparison chest x-ray March 7, 2022 show unchanged enlarged cardiomediastinal silhouette. Enlarged central hilar vascular structures and perihilar interstitial thickening noted, similar to previous exam. Pulmonary vasculature is normal. No acute osseous abnormality. IMPRESSION: Enlarged cardiomediastinal silhouette with enlarged central hilar vascular structures and perihilar interstitial thickening. Findings are  consistent with CHF and mild pulmonary edema. Electronically signed by:  Damian Perez DO  3/17/2022 4:15 PM CDT Workstation: [id number]       Assessment/Plan:     * Chest pain  Musculoskeletal pain?  History of CAD  Slightly elevated troponin, may due to decreased clearance  Negative nuclear stress test on 09/16/2021  Trend troponin  Continue beta-blocker  Aspirin and nitroglycerin  Consult Cardiology      Acute on chronic combined systolic and diastolic heart failure   Reports compliant with medication, currently on metolazone and Demadex    Bronchial breath sounds bilaterally, bilateral LE edema  Chest imaging showed mild pulmonary vessel congestion/trace edema.  IV furosemide 60 mg x 1 now, followed by 40 mg b.i.d.  Hold Demadex for now  Daily weight, intake and output      Pneumonia, possible  No leukocytosis  Reports persistent productive cough and chills since her nephrectomy last month  Add procalcitonin and blood cultures  Start IV ceftriaxone and oral azithromycin  Deescalate antibiotics as indicated      CKD stage 4 secondary to hypertension  Monitor renal function  Consult nephrology      Paroxysmal atrial fibrillation  Chronic medical condition  Continue amiodarone  Continue carvedilol and apixaban  Continue cardiac monitor      Pulmonary hypertension  Chronic medical condition  SpO2 stable  Monitor      Type 2 diabetes mellitus with hyperglycemia  Hemoglobin A1c 5.5 about 2 months ago  Monitor blood glucose  Low-dose sliding scale insulin      Gout  Chronic medical condition  Continue home medication      Hypothyroidism  Chronic medical condition  Continue home medication      Essential hypertension  Chronic medical condition  Continue home medication  Monitor      VTE Risk Mitigation (From admission, onward)         Ordered     IP VTE HIGH RISK PATIENT  Once         04/08/22 2148     Place sequential compression device  Until discontinued         04/08/22 2148                   Shelly BENAVIDES  TAI Bell  Department of Hospital Medicine   [facility]

## 2022-04-09 NOTE — ED PROVIDER NOTES
Encounter Date: 4/8/2022       History     Chief Complaint   Patient presents with    Chest Pain     Chest pain with sob x 2 days.  Pain increases with exertion.  Took ntg 2 x today with some relief.      HPI     Seen and evaluated presented with chief complaint of chest pain and shortness of breath.  This been ongoing for 2 days.  Is worse with any attempts at exertion.  Today she did get some intermittent benefit from taking nitroglycerin.  The chest pain is moderate.  There is no significant alleviating factors.  It is worse with exertion.  This is episodic and that survey shin.  Symptoms are no ordered for chest pain and shortness of breath in the setting of known history of kidney disease.      Review of patient's allergies indicates:   Allergen Reactions    Percocet [oxycodone-acetaminophen] Itching    Irbesartan Swelling    Januvia [sitagliptin]     Jardiance [empagliflozin]      Leg cramps    Lipitor [atorvastatin] Other (See Comments)     Severe leg pain    Linaclotide Other (See Comments) and Nausea And Vomiting     Does not remember    Lubiprostone Other (See Comments) and Palpitations     Does not remember     Past Medical History:   Diagnosis Date    Anticoagulant long-term use     Arthritis     Breast cancer 2014    invasive lobular carcinoma    Cancer of kidney 11/2020    RIGHT KIDNEY CANCER    CHF (congestive heart failure)     Coronary artery disease dx 2005    Depression     Diabetes mellitus     Diastolic heart failure secondary to hypertension     Gout     Hyperlipemia     Hypertension     Hypertrophy of nasal turbinates     Kidney mass 2020    Right    Levoscoliosis     Lung nodule     left    Multiple thyroid nodules     NICOLE (obstructive sleep apnea)     uses C-PAP    Pulmonary hypertension      Past Surgical History:   Procedure Laterality Date    AORTOGRAPHY N/A 12/04/2020    Procedure: Aortogram;  Surgeon: Paul Pedersen MD;  Location: [facility];  Service:  "Cardiology;  Laterality: N/A;    CARDIAC CATHETERIZATION  2020    CHOLECYSTECTOMY      COLONOSCOPY      multi -last 2014     ESOPHAGOGASTRODUODENOSCOPY      2012     HAND SURGERY Right     LAPAROSCOPIC ROBOT-ASSISTED SURGICAL REMOVAL OF KIDNEY USING DA CHELLE XI Right 3/10/2022    Procedure: XI ROBOTIC NEPHRECTOMY- radical;  Surgeon: Rolando Ramirez MD;  Location: [facility] OR;  Service: Urology;  Laterality: Right;    MASTECTOMY W/ SENTINEL NODE BIOPSY Bilateral 2015    bilateral "dog ears"    NASAL SINUS SURGERY      Dr Bryant FESS/cauterization turbinate     PARTIAL HYSTERECTOMY      PERCUTANEOUS TRANSLUMINAL BALLOON ANGIOPLASTY OF CORONARY ARTERY  2020    Procedure: Angioplasty-coronary;  Surgeon: Paul Pedersen MD;  Location: [facility] CATH;  Service: Cardiology;;    RENAL BIOPSY Right     2021 EJ    TUBAL LIGATION      ULTRASOUND GUIDANCE  2020    Procedure: Ultrasound Guidance;  Surgeon: Paul Pedersen MD;  Location: [facility] CATH;  Service: Cardiology;;     Family History   Problem Relation Age of Onset    Breast cancer Mother     Stroke Father     Hypertension Father     Hepatitis Brother     Asthma Daughter     Birth defects Daughter         Nell's two children has cleft lips    Depression Daughter     Drug abuse Daughter     Learning disabilities Daughter     Mental illness Daughter     Breast cancer Maternal Aunt     Glaucoma Sister     Drug abuse Daughter     Macular degeneration Neg Hx     Retinal detachment Neg Hx      Social History     Tobacco Use    Smoking status: Former Smoker     Packs/day: 1.00     Years: 0.00     Pack years: 0.00     Types: Cigarettes     Quit date: 2016     Years since quittin.2    Smokeless tobacco: Never Used    Tobacco comment: quit    Substance Use Topics    Alcohol use: No    Drug use: No     Review of Systems   Constitutional: Negative for fever.   HENT: Negative for sore throat.    Respiratory: " Positive for shortness of breath.    Cardiovascular: Negative for chest pain.   Gastrointestinal: Negative for nausea.   Genitourinary: Negative for dysuria.   Musculoskeletal: Negative for back pain.   Skin: Negative for rash.   Neurological: Negative for weakness.   Hematological: Does not bruise/bleed easily.   All other systems reviewed and are negative.      Physical Exam     Initial Vitals [04/08/22 1902]   BP Pulse Resp Temp SpO2   (!) 199/86 68 18 98.1 °F (36.7 °C) 98 %      MAP       --         Physical Exam    Nursing note and vitals reviewed.  Constitutional: She appears well-developed and well-nourished.   HENT:   Head: Normocephalic and atraumatic.   Eyes: Conjunctivae are normal.   Neck:   Normal range of motion.  Cardiovascular: Normal rate and regular rhythm.   Pulmonary/Chest: No respiratory distress.   Abdominal: Abdomen is soft.   Musculoskeletal:         General: Normal range of motion.      Cervical back: Normal range of motion.     Neurological: She is alert and oriented to person, place, and time.   Skin: Skin is warm and dry.   Psychiatric: She has a normal mood and affect. Her speech is normal.         ED Course   Procedures  Labs Reviewed   CBC W/ AUTO DIFFERENTIAL - Abnormal; Notable for the following components:       Result Value    RBC 3.79 (*)     Hemoglobin 9.4 (*)     Hematocrit 31.8 (*)     MCH 24.8 (*)     MCHC 29.6 (*)     RDW 17.5 (*)     All other components within normal limits   COMPREHENSIVE METABOLIC PANEL - Abnormal; Notable for the following components:    Glucose 242 (*)     BUN 56 (*)     Creatinine 3.0 (*)     eGFR if  17.6 (*)     eGFR if non  15.3 (*)     All other components within normal limits   TROPONIN I - Abnormal; Notable for the following components:    Troponin I 0.060 (*)     All other components within normal limits   B-TYPE NATRIURETIC PEPTIDE - Abnormal; Notable for the following components:     (*)     All other  components within normal limits   SARS-COV-2 RNA AMPLIFICATION, QUAL          Imaging Results          X-Ray Chest AP Portable (Final result)  Result time 04/08/22 19:55:54    Final result by Jose Roberto Ojeda MD (04/08/22 19:55:54)                 Narrative:    CLINICAL HISTORY:  71 years (1950) Female CHF Chest pain, shortness of breath, cough    TECHNIQUE:  Portable AP radiograph the chest.    COMPARISON:  Radiograph from March 17, 2022.    FINDINGS:  Mildly increased central hilar interstitial opacities are seen bilaterally suggestive of either mild edema, atypical infection/pneumonia or bronchitis in the appropriate clinical setting. No consolidative pneumonia is seen.  No pneumothorax is identified. The cardiac silhouette is moderately enlarged.  Osseous structures appear unchanged. The visualized upper abdomen is unremarkable.    IMPRESSION:  Cardiomegaly and findings of mild pulmonary vessel congestion/trace edema.                  .            Electronically signed by:  Jose Roberto Ojeda MD  4/8/2022 7:55 PM CDT Workstation: [id number]                               Medications - No data to display  Medical Decision Making:   Initial Assessment:   Seen and evaluated.  Presented with chief complaint of chest pain shortness of breath.  Clinical picture consistent with congestive heart failure exacerbation.  Will observe with Hospital Medicine.  Troponin elevated.  BNP elevated.  Hemodynamically stable not requiring supplemental oxygen at this time                      Clinical Impression:   Final diagnoses:  [R06.02] Shortness of breath                 Fadi Mcmahon Jr., MD  04/08/22 5713

## 2022-04-09 NOTE — ASSESSMENT & PLAN NOTE
Chronic medical condition  Continue amiodarone  Continue carvedilol and apixaban  Continue cardiac monitor

## 2022-04-09 NOTE — ED NOTES
Pt c/o of left sided chest pain under left breast that has been intermittent since yesterday. Pt states the pain feels like gas. Pt has SOB but states she is always SOB. Pt denies any other symptoms. Pt states she took 2 nitro sublingual with some relief PTA

## 2022-04-09 NOTE — CARE UPDATE
04/09/22 0803   Patient Assessment/Suction   Level of Consciousness (AVPU) alert   Respiratory Effort Normal;Unlabored   Expansion/Accessory Muscles/Retractions no use of accessory muscles;no retractions;expansion symmetric   All Lung Fields Breath Sounds clear   Rhythm/Pattern, Respiratory unlabored;pattern regular;depth regular;chest wiggle adequate;no shortness of breath reported   Cough Frequency no cough   PRE-TX-O2   O2 Device (Oxygen Therapy) room air   SpO2 96 %   Pulse Oximetry Type Continuous   Pulse 60   Resp 18   Aerosol Therapy   $ Aerosol Therapy Charges PRN treatment not required   Education   $ Education Bronchodilator;15 min   Respiratory Evaluation   $ Care Plan Tech Time 15 min

## 2022-04-09 NOTE — CARE UPDATE
Pt discharged home with daughter. Pt stable. Discharge instructions given and explained to pt.     Tara CHAUDHARY.

## 2022-04-09 NOTE — ASSESSMENT & PLAN NOTE
Musculoskeletal pain?  History of CAD  Slightly elevated troponin, may due to decreased clearance  Negative nuclear stress test on 09/16/2021  Trend troponin  Continue beta-blocker  Aspirin and nitroglycerin  Consult Cardiology

## 2022-04-09 NOTE — HPI
The patient is a 71-year-old  female with history of combined heart failure, diabetes mellitus, CKD, paroxysmal atrial fibrillation on Eliquis, CAD (followed by Dr. Libby Johnson), nonrheumatic mitral regurgitation, pulmonary hypertension, hypertension, hyperlipidemia - intolerant to statins, hypothyroidism, CKD (followed by Dr. Martinez), right renal carcinoma - status post right nephrectomy on 03/10/2022.  She presented to emergency department with chest pain.  Reports persistent severe pain under left breast, above left ribcage area, since yesterday morning.  Described pain as sharp, soreness, gas pain, uncomfortable sensation.  States that she took a total of 2 nitroglycerin sublingual with minimal improvement.  Also took simethicone with no improvements.  States that pain has been consistent but occasionally worse at times.  Reports occasional cough with yellow and green sputum since her nephrectomy last month.  Has been having frequent chills.  Denies more shortness of breath than usual.  Denies abdominal pain, nausea, vomiting, diarrhea.  Has constipation, especially with pain medication.      Workup in the ED showed elevated BNP, 800 and slightly elevated troponin at 0.060.  BUN creatinine are high but consistent to last month values. EKG, personally reviewed, showed sinus rhythm with left axis deviation and T-wave inversion in lead 1 and aVL, similar to previous. Chest imaging showed Cardiomegaly and findings of mild pulmonary vessel congestion/trace edema.    Per my chart review:  Results for orders placed during the hospital encounter of 11/19/21    Echo    Interpretation Summary  · The estimated ejection fraction is 35-40%.  · The left ventricle is moderately enlarged with moderate concentric hypertrophy  · Severe left atrial enlargement.  · Normal right ventricular size with normal right ventricular systolic function.  · Mild-to-moderate mitral regurgitation.  · Mild tricuspid  regurgitation.  · The estimated PA systolic pressure is 44 mmHg.  · There is pulmonary hypertension.    Results for orders placed during the hospital encounter of 09/16/21    Nuclear Stress - Cardiology Interpreted    Interpretation Summary    Abnormal myocardial perfusion scan.  No reversible ischemia.    There is a mild to moderate intensity, small to moderate sized, fixed defect consistent with scar in the inferobasilar wall(s).    The gated perfusion images showed an ejection fraction of 30% at rest.    The EKG portion of this study is negative for ischemia.    There were no arrhythmias during stress.

## 2022-04-09 NOTE — SUBJECTIVE & OBJECTIVE
"Past Medical History:   Diagnosis Date    Anticoagulant long-term use     Arthritis     Breast cancer 2014    invasive lobular carcinoma    Cancer of kidney 11/2020    RIGHT KIDNEY CANCER    CHF (congestive heart failure)     Coronary artery disease dx 2005    Depression     Diabetes mellitus     Diastolic heart failure secondary to hypertension     Gout     Hyperlipemia     Hypertension     Hypertrophy of nasal turbinates     Kidney mass 2020    Right    Levoscoliosis     Lung nodule     left    Multiple thyroid nodules     NICOLE (obstructive sleep apnea)     uses C-PAP    Pulmonary hypertension        Past Surgical History:   Procedure Laterality Date    AORTOGRAPHY N/A 12/04/2020    Procedure: Aortogram;  Surgeon: Paul Pedersen MD;  Location: [facility] CATH;  Service: Cardiology;  Laterality: N/A;    CARDIAC CATHETERIZATION  12/2020    CHOLECYSTECTOMY      COLONOSCOPY      multi -last 2014     ESOPHAGOGASTRODUODENOSCOPY      2012     HAND SURGERY Right     LAPAROSCOPIC ROBOT-ASSISTED SURGICAL REMOVAL OF KIDNEY USING DA CHELLE XI Right 3/10/2022    Procedure: XI ROBOTIC NEPHRECTOMY- radical;  Surgeon: Rolando Ramirez MD;  Location: [facility] OR;  Service: Urology;  Laterality: Right;    MASTECTOMY W/ SENTINEL NODE BIOPSY Bilateral 01/21/2015    bilateral "dog ears"    NASAL SINUS SURGERY  2015    Dr Bryant FESS/cauterization turbinate     PARTIAL HYSTERECTOMY  1989    PERCUTANEOUS TRANSLUMINAL BALLOON ANGIOPLASTY OF CORONARY ARTERY  12/04/2020    Procedure: Angioplasty-coronary;  Surgeon: Paul Pedersen MD;  Location: [facility] CATH;  Service: Cardiology;;    RENAL BIOPSY Right     9/20/2021 EJ    TUBAL LIGATION      ULTRASOUND GUIDANCE  12/04/2020    Procedure: Ultrasound Guidance;  Surgeon: Paul Pedersen MD;  Location: [facility] CATH;  Service: Cardiology;;       Review of patient's allergies indicates:   Allergen Reactions    Percocet [oxycodone-acetaminophen] Itching    Irbesartan Swelling    Januvia [sitagliptin]     " Jardiance [empagliflozin]      Leg cramps    Lipitor [atorvastatin] Other (See Comments)     Severe leg pain    Linaclotide Other (See Comments) and Nausea And Vomiting     Does not remember    Lubiprostone Other (See Comments) and Palpitations     Does not remember       No current facility-administered medications on file prior to encounter.     Current Outpatient Medications on File Prior to Encounter   Medication Sig    allopurinoL (ZYLOPRIM) 100 MG tablet Take 1 tablet (100 mg total) by mouth once daily. (Patient taking differently: Take 300 mg by mouth every other day.)    amiodarone (PACERONE) 100 MG Tab Take 1 tablet (100 mg total) by mouth once daily.    amLODIPine (NORVASC) 5 MG tablet Take 2 tablets (10 mg total) by mouth nightly.    carvediloL (COREG) 25 MG tablet TAKE ONE TABLET BY MOUTH TWICE DAILY (Patient taking differently: Take 25 mg by mouth 2 (two) times daily with meals.)    ELIQUIS 5 mg Tab TAKE ONE TABLET BY MOUTH TWICE DAILY (Patient taking differently: Take 5 mg by mouth 2 (two) times daily.)    levothyroxine (SYNTHROID) 50 MCG tablet TAKE ONE TABLET BY MOUTH EVERY MORNING ON an empty stomach (Patient taking differently: Take 50 mcg by mouth before breakfast.)    magnesium oxide (MAG-OX) 400 mg (241.3 mg magnesium) tablet Take 1 tablet (400 mg total) by mouth daily as needed (cramping).    metOLazone (ZAROXOLYN) 2.5 MG tablet Take 2.5 mg by mouth once daily.    montelukast (SINGULAIR) 10 mg tablet TAKE ONE TABLET BY MOUTH EVERY EVENING FOR allergies (Patient taking differently: Take 10 mg by mouth every evening.)    polyethylene glycol (GLYCOLAX) 17 gram/dose powder Take 17 g by mouth 2 (two) times daily.    torsemide (DEMADEX) 20 MG Tab Take 1 tablet (20 mg total) by mouth once daily.    albuterol (PROVENTIL/VENTOLIN HFA) 90 mcg/actuation inhaler Inhale 1-2 puffs into the lungs every 6 (six) hours as needed for Wheezing or Shortness of Breath. Rescue    benzonatate (TESSALON) 100 MG capsule  Take 1-2 capsules (100-200 mg total) by mouth 3 (three) times daily as needed for Cough.    blood sugar diagnostic Strp To check BG2 times daily,and PRN hypoglycemia  to use with insurance preferred meter Diagnosis Code: E11.65    nitroGLYCERIN (NITROSTAT) 0.4 MG SL tablet Place 1 tablet (0.4 mg total) under the tongue every 5 (five) minutes as needed for Chest pain.    PRALUENT PEN 75 mg/mL PnIj Inject 1 mL (75 mg total) into the skin every 14 (fourteen) days.    [DISCONTINUED] aspirin (ECOTRIN) 81 MG EC tablet Take 1 tablet (81 mg total) by mouth once daily.    [DISCONTINUED] bisacodyL (DULCOLAX) 10 mg Supp Place 1 suppository (10 mg total) rectally 2 (two) times daily as needed (constipation). (Patient not taking: No sig reported)    [DISCONTINUED] docusate sodium (COLACE) 100 MG capsule Take 1 capsule (100 mg total) by mouth once daily. (Patient not taking: No sig reported)    [DISCONTINUED] DULoxetine (CYMBALTA) 20 MG capsule TAKE ONE CAPSULE BY MOUTH ONCE DAILY    [DISCONTINUED] folic acid (FOLVITE) 1 MG tablet Take 1 mg by mouth once daily.    [DISCONTINUED] furosemide (LASIX) 40 MG tablet Take 1 tablet (40 mg total) by mouth once daily.    [DISCONTINUED] hydrALAZINE (APRESOLINE) 50 MG tablet Take 3 tablets (150 mg total) by mouth every 12 (twelve) hours. (Patient taking differently: Take 50 mg by mouth 3 (three) times daily.)    [DISCONTINUED] HYDROcodone-acetaminophen (NORCO)  mg per tablet Take 1 tablet by mouth every 6 (six) hours as needed for Pain.    [DISCONTINUED] metFORMIN (GLUCOPHAGE-XR) 500 MG ER 24hr tablet Take 2 tablets (1,000 mg total) by mouth 2 (two) times daily with meals.    [DISCONTINUED] promethazine-dextromethorphan (PROMETHAZINE-DM) 6.25-15 mg/5 mL Syrp Take 5 mLs by mouth every 8 (eight) hours as needed.    [DISCONTINUED] simethicone 125 mg Tab Take 1 tablet by mouth 2 (two) times daily.    [DISCONTINUED] sodium bicarbonate 650 MG tablet Take 1 tablet (650 mg total) by mouth once  daily.     Family History       Problem Relation (Age of Onset)    Asthma Daughter    Birth defects Daughter    Breast cancer Mother, Maternal Aunt    Depression Daughter    Drug abuse Daughter, Daughter    Glaucoma Sister    Hepatitis Brother    Hypertension Father    Learning disabilities Daughter    Mental illness Daughter    Stroke Father          Tobacco Use    Smoking status: Former Smoker     Packs/day: 1.00     Years: 0.00     Pack years: 0.00     Types: Cigarettes     Quit date: 2016     Years since quittin.2    Smokeless tobacco: Never Used    Tobacco comment: quit    Substance and Sexual Activity    Alcohol use: No    Drug use: No    Sexual activity: Not Currently     Partners: Male     Review of Systems  Objective:     Vital Signs (Most Recent):  Temp: 98.1 °F (36.7 °C) (22)  Pulse: (!) 57 (22)  Resp: 20 (22)  BP: (!) 170/77 (22)  SpO2: 96 % (22)   Vital Signs (24h Range):  Temp:  [98.1 °F (36.7 °C)] 98.1 °F (36.7 °C)  Pulse:  [57-68] 57  Resp:  [18-20] 20  SpO2:  [96 %-98 %] 96 %  BP: (170-199)/(76-86) 170/77     Weight: 125.2 kg (276 lb)  Body mass index is 43.23 kg/m².    Physical Exam  Constitutional:       Comments: Appears uncomfortable   HENT:      Head: Normocephalic and atraumatic.      Nose: Nose normal.      Mouth/Throat:      Mouth: Mucous membranes are moist.   Eyes:      Conjunctiva/sclera: Conjunctivae normal.   Cardiovascular:      Rate and Rhythm: Regular rhythm.      Pulses: Normal pulses.      Heart sounds: Murmur heard.   Pulmonary:      Effort: Pulmonary effort is normal.      Breath sounds: Rhonchi present.   Abdominal:      General: Abdomen is flat.      Palpations: Abdomen is soft.   Genitourinary:     General: Normal vulva.   Musculoskeletal:      Cervical back: Normal range of motion and neck supple.      Right lower leg: Edema present.      Left lower leg: Edema present.   Skin:     General: Skin is warm  and dry.      Capillary Refill: Capillary refill takes less than 2 seconds.   Neurological:      General: No focal deficit present.      Mental Status: She is alert and oriented to person, place, and time.   Psychiatric:         Mood and Affect: Mood normal.         Behavior: Behavior normal.           Significant Labs: All pertinent labs within the past 24 hours have been reviewed.  CBC:   Recent Labs   Lab 04/07/22 1536 04/08/22 1947   WBC 6.55 7.21   HGB 9.4* 9.4*   HCT 33.2* 31.8*    313     CMP:   Recent Labs   Lab 04/07/22 1536 04/08/22 1947    139   K 4.5 4.3    101   CO2 29 24   * 242*   BUN 54* 56*   CREATININE 3.2* 3.0*   CALCIUM 10.7* 9.9   PROT  --  7.5   ALBUMIN 3.4* 3.7   BILITOT  --  0.5   ALKPHOS  --  113   AST  --  20   ALT  --  29   ANIONGAP 10 14   EGFRNONAA 14.1* 15.3*     Cardiac Markers:   Recent Labs   Lab 04/08/22 1947   *       Troponin:   Recent Labs   Lab 04/08/22 1947   TROPONINI 0.060*     Urine Studies:   Recent Labs   Lab 04/07/22 1536   RBCUA 1   WBCUA 1   BACTERIA Rare   SQUAMEPITHEL 0   HYALINECASTS 0       Significant Imaging: I have reviewed all pertinent imaging results/findings within the past 24 hours.  X-Ray Chest AP Portable    Result Date: 4/8/2022  CLINICAL HISTORY: 71 years (1950) Female CHF Chest pain, shortness of breath, cough TECHNIQUE: Portable AP radiograph the chest. COMPARISON: Radiograph from March 17, 2022. FINDINGS: Mildly increased central hilar interstitial opacities are seen bilaterally suggestive of either mild edema, atypical infection/pneumonia or bronchitis in the appropriate clinical setting. No consolidative pneumonia is seen.  No pneumothorax is identified. The cardiac silhouette is moderately enlarged.  Osseous structures appear unchanged. The visualized upper abdomen is unremarkable. IMPRESSION: Cardiomegaly and findings of mild pulmonary vessel congestion/trace edema. . Electronically signed by:  Jose Roberto  Rusty BACA  4/8/2022 7:55 PM CDT Workstation: [id number]    X-Ray Chest AP Portable    Result Date: 3/17/2022  Reason: shortness of breath FINDINGS: PA and lateral chest with comparison chest x-ray March 7, 2022 show unchanged enlarged cardiomediastinal silhouette. Enlarged central hilar vascular structures and perihilar interstitial thickening noted, similar to previous exam. Pulmonary vasculature is normal. No acute osseous abnormality. IMPRESSION: Enlarged cardiomediastinal silhouette with enlarged central hilar vascular structures and perihilar interstitial thickening. Findings are consistent with CHF and mild pulmonary edema. Electronically signed by:  Damian Perez DO  3/17/2022 4:15 PM CDT Workstation: [id number]

## 2022-04-09 NOTE — DISCHARGE SUMMARY
[facility] Medicine  Discharge Summary      Patient Name: Juhi Taylor  MRN: [id number]  Patient Class: OP- Observation  Admission Date: 4/8/2022  Hospital Length of Stay: 0 days  Discharge Date and Time:  04/09/2022 3:54 PM  Attending Physician: No att. providers found   Discharging Provider: Baldev Hoover MD  Primary Care Provider: Tony Sadler MD      HPI:   The patient is a 71-year-old  female with history of combined heart failure, diabetes mellitus, CKD, paroxysmal atrial fibrillation on Eliquis, CAD (followed by Dr. Libby Johnson), nonrheumatic mitral regurgitation, pulmonary hypertension, hypertension, hyperlipidemia - intolerant to statins, hypothyroidism, CKD (followed by Dr. Martinez), right renal carcinoma - status post right nephrectomy on 03/10/2022.  She presented to emergency department with chest pain.  Reports persistent severe pain under left breast, above left ribcage area, since yesterday morning.  Described pain as sharp, soreness, gas pain, uncomfortable sensation.  States that she took a total of 2 nitroglycerin sublingual with minimal improvement.  Also took simethicone with no improvements.  States that pain has been consistent but occasionally worse at times.  Reports occasional cough with yellow and green sputum since her nephrectomy last month.  Has been having frequent chills.  Denies more shortness of breath than usual.  Denies abdominal pain, nausea, vomiting, diarrhea.  Has constipation, especially with pain medication.      Workup in the ED showed elevated BNP, 800 and slightly elevated troponin at 0.060.  BUN creatinine are high but consistent to last month values. EKG, personally reviewed, showed sinus rhythm with left axis deviation and T-wave inversion in lead 1 and aVL, similar to previous. Chest imaging showed Cardiomegaly and findings of mild pulmonary vessel congestion/trace edema.    Per my chart review:  Results for orders  placed during the hospital encounter of 11/19/21    Echo    Interpretation Summary  · The estimated ejection fraction is 35-40%.  · The left ventricle is moderately enlarged with moderate concentric hypertrophy  · Severe left atrial enlargement.  · Normal right ventricular size with normal right ventricular systolic function.  · Mild-to-moderate mitral regurgitation.  · Mild tricuspid regurgitation.  · The estimated PA systolic pressure is 44 mmHg.  · There is pulmonary hypertension.    Results for orders placed during the hospital encounter of 09/16/21    Nuclear Stress - Cardiology Interpreted    Interpretation Summary    Abnormal myocardial perfusion scan.  No reversible ischemia.    There is a mild to moderate intensity, small to moderate sized, fixed defect consistent with scar in the inferobasilar wall(s).    The gated perfusion images showed an ejection fraction of 30% at rest.    The EKG portion of this study is negative for ischemia.    There were no arrhythmias during stress.              * No surgery found *      Hospital Course:   Patient admitted with acute on chronic systolic CHF/atypical chest pain /PEACE on CKD  Pt had localized chest pain which increases upon inspiration and was managed conservatively  She had Nephrectomy This Year  3 weeks ago she got admitted with same problem and still noncompliant with diuretics  Per Pt diuretics will make her dehydrated !  This time she was treated with iv diuretics with improvement in symptoms and PEACE   She had normal stress test which was done few months ago  Pt was counseled about the importance of medication compliance  and Follow up with Mds in OP setting  Later she was Discharged to home       Goals of Care Treatment Preferences:  Code Status: Full Code      Consults:     No new Assessment & Plan notes have been filed under this hospital service since the last note was generated.  Service: Hospital Medicine    Final Active Diagnoses:    Diagnosis Date  Noted POA    CKD stage 4 secondary to hypertension [I12.9, N18.4] 04/07/2022 Yes    Paroxysmal atrial fibrillation [I48.0] 12/10/2021 Yes    Pulmonary hypertension [I27.20] 04/12/2021 Yes    Type 2 diabetes mellitus with hyperglycemia [E11.65] 05/25/2020 Yes    Gout [M10.9] 06/04/2019 Yes    Hypothyroidism [E03.9] 03/01/2019 Yes    Essential hypertension [I10] 08/31/2017 Yes      Problems Resolved During this Admission:    Diagnosis Date Noted Date Resolved POA    PRINCIPAL PROBLEM:  Chest pain [R07.9]  04/09/2022 Unknown    Pneumonia, possible [J18.9] 04/08/2022 04/09/2022 Yes    Acute on chronic combined systolic and diastolic heart failure [I50.43] 12/10/2021 04/09/2022 Unknown       Discharged Condition: good    Disposition: Home or Self Care    Follow Up:   Follow-up Information     Paolo Martinez MD Follow up in 1 week(s).    Specialty: Nephrology  Contact information:  [address] [facility] NEPHROLOGY [facility] [address] [phone]             Libby Johnson MD Follow up in 1 week(s).    Specialties: Interventional Cardiology, Cardiology  Contact information:  [address] [place] [address] [phone]                       Patient Instructions:   No discharge procedures on file.    Significant Diagnostic Studies: Labs:   CMP   Recent Labs   Lab 04/08/22 1947 04/09/22  0345    141   K 4.3 3.9    102   CO2 24 27   * 121*   BUN 56* 52*   CREATININE 3.0* 2.9*   CALCIUM 9.9 9.9   PROT 7.5  --    ALBUMIN 3.7  --    BILITOT 0.5  --    ALKPHOS 113  --    AST 20  --    ALT 29  --    ANIONGAP 14 12   ESTGFRAFRICA 17.6* 18.3*   EGFRNONAA 15.3* 15.9*    and CBC   Recent Labs   Lab 04/08/22 1947 04/09/22  0345   WBC 7.21 6.97   HGB 9.4* 9.2*   HCT 31.8* 31.4*    310       Pending Diagnostic Studies:     None         Medications:  Reconciled Home Medications:      Medication List      START taking these medications    LIDOcaine 5 %  Commonly known as:  LIDODERM  Place 1 patch onto the skin once daily. Remove & Discard patch within 12 hours or as directed by MD for 5 days     pantoprazole 40 MG tablet  Commonly known as: PROTONIX  Take 1 tablet (40 mg total) by mouth once daily. for 14 days     predniSONE 20 MG tablet  Commonly known as: DELTASONE  Take 1 tablet (20 mg total) by mouth once daily for 3 days, THEN 0.5 tablets (10 mg total) once daily for 3 days.  Start taking on: April 9, 2022     sucralfate 1 gram tablet  Commonly known as: CARAFATE  Take 1 tablet (1 g total) by mouth 4 (four) times daily. for 7 days        CHANGE how you take these medications    allopurinoL 100 MG tablet  Commonly known as: ZYLOPRIM  Take 1 tablet (100 mg total) by mouth once daily.  What changed:   · how much to take  · when to take this     carvediloL 25 MG tablet  Commonly known as: COREG  TAKE ONE TABLET BY MOUTH TWICE DAILY  What changed: when to take this     ELIQUIS 5 mg Tab  Generic drug: apixaban  TAKE ONE TABLET BY MOUTH TWICE DAILY  What changed: how much to take     levothyroxine 50 MCG tablet  Commonly known as: SYNTHROID  TAKE ONE TABLET BY MOUTH EVERY MORNING ON an empty stomach  What changed: when to take this     montelukast 10 mg tablet  Commonly known as: SINGULAIR  TAKE ONE TABLET BY MOUTH EVERY EVENING FOR allergies  What changed: See the new instructions.        CONTINUE taking these medications    albuterol 90 mcg/actuation inhaler  Commonly known as: PROVENTIL/VENTOLIN HFA  Inhale 1-2 puffs into the lungs every 6 (six) hours as needed for Wheezing or Shortness of Breath. Rescue     amiodarone 100 MG Tab  Commonly known as: PACERONE  Take 1 tablet (100 mg total) by mouth once daily.     amLODIPine 5 MG tablet  Commonly known as: NORVASC  Take 2 tablets (10 mg total) by mouth nightly.     benzonatate 100 MG capsule  Commonly known as: TESSALON  Take 1-2 capsules (100-200 mg total) by mouth 3 (three) times daily as needed for Cough.     blood sugar diagnostic  Strp  To check BG2 times daily,and PRN hypoglycemia  to use with insurance preferred meter Diagnosis Code: E11.65     magnesium oxide 400 mg (241.3 mg magnesium) tablet  Commonly known as: MAG-OX  Take 1 tablet (400 mg total) by mouth daily as needed (cramping).     metOLazone 2.5 MG tablet  Commonly known as: ZAROXOLYN  Take 1 tablet (2.5 mg total) by mouth once daily.     nitroGLYCERIN 0.4 MG SL tablet  Commonly known as: NITROSTAT  Place 1 tablet (0.4 mg total) under the tongue every 5 (five) minutes as needed for Chest pain.     polyethylene glycol 17 gram/dose powder  Commonly known as: GLYCOLAX  Take 17 g by mouth 2 (two) times daily.     PRALUENT PEN 75 mg/mL Pnij  Generic drug: alirocumab  Inject 1 mL (75 mg total) into the skin every 14 (fourteen) days.     torsemide 20 MG Tab  Commonly known as: DEMADEX  Take 1 tablet (20 mg total) by mouth once daily.        STOP taking these medications    aspirin 81 MG EC tablet  Commonly known as: ECOTRIN     bisacodyL 10 mg Supp  Commonly known as: DULCOLAX     docusate sodium 100 MG capsule  Commonly known as: COLACE     DULoxetine 20 MG capsule  Commonly known as: CYMBALTA     folic acid 1 MG tablet  Commonly known as: FOLVITE     furosemide 40 MG tablet  Commonly known as: LASIX     hydrALAZINE 50 MG tablet  Commonly known as: APRESOLINE     HYDROcodone-acetaminophen  mg per tablet  Commonly known as: NORCO     metFORMIN 500 MG ER 24hr tablet  Commonly known as: GLUCOPHAGE-XR     promethazine-dextromethorphan 6.25-15 mg/5 mL Syrp  Commonly known as: PROMETHAZINE-DM     simethicone 125 mg Tab     sodium bicarbonate 650 MG tablet            Indwelling Lines/Drains at time of discharge:   Lines/Drains/Airways     None               Physical Exam   Cardiovascular: Normal rate.   Neurological: She is alert.     Time spent on the discharge of patient: 25  minutes         Baldev Hoover MD  Department of Hospital Medicine  [facility]

## 2022-04-09 NOTE — ASSESSMENT & PLAN NOTE
No leukocytosis  Reports persistent productive cough and chills since her nephrectomy last month  Add procalcitonin and blood cultures  Start IV ceftriaxone and oral azithromycin  Deescalate antibiotics as indicated

## 2022-04-09 NOTE — PLAN OF CARE
04/09/22 1054   ESPINOZA Message   Medicare Outpatient and Observation Notification regarding financial responsibility Given to patient/caregiver;Explained to patient/caregiver;Signed/date by patient/caregiver   Date ESPINOZA was signed 04/09/22   Time ESPINOZA was signed 1055

## 2022-04-09 NOTE — PLAN OF CARE
Met with patient at bedside. Patient is being discharged home with no identified CM needs.       04/09/22 1055   Final Note   Assessment Type Final Discharge Note   Anticipated Discharge Disposition Home   Post-Acute Status   Discharge Delays None known at this time

## 2022-04-09 NOTE — PLAN OF CARE
[facility]  Initial Discharge Assessment       Primary Care Provider: Tony Sadler MD    Admission Diagnosis: Chest pain [R07.9]    Admission Date: 4/8/2022  Expected Discharge Date: 4/9/2022    Discharge Barriers Identified: None    Assessment completed at bedside with patient. Patient does not have an AD or POA. Patient lives with her adult daughter, Maria Luisa López, [phone] and plans to return home at discharge. Patient uses a rolling walker and a straight cane at home. Pt is not requesting any DME upon discharge.     Payor: MEDICARE / Plan: MEDICARE PART A & B / Product Type: Government /     Extended Emergency Contact Information  Primary Emergency Contact: Maria Luisa López   [place]  Mobile Phone: [phone]  Relation: Daughter    Discharge Plan A: Home with family  Discharge Plan B: Home with family      [id number] [place] [facility] [address]  [address]  [facility] [address]  Phone: [phone] Fax: [phone]    [facility] - [place] - [address]  [address]  [facility] [address]  Phone: [phone] Fax: [phone]    [facility] #[id number] - [place], [place] - [address]  [address]  [facility] [address]  Phone: [phone] Fax: [phone]      Initial Assessment (most recent)       Adult Discharge Assessment - 04/09/22 1039          Discharge Assessment    Assessment Type Discharge Planning Assessment     Confirmed/corrected address, phone number and insurance Yes     Confirmed Demographics Correct on Facesheet     Source of Information patient     When was your last doctors appointment? 04/07/22   PCP    Does patient/caregiver understand observation status Yes     Communicated HARINI with patient/caregiver Date not available/Unable to determine     Reason For Admission severe chest/breast pain     Lives With child(stan), adult     Facility Arrived From: home with her  daughter     Do you expect to return to your current living situation? Yes     Do you have help at home or someone to help you manage your care at home? Yes     Who are your caregiver(s) and their phone number(s)? Maria Luisa López, [phone], daughter     Prior to hospitilization cognitive status: Alert/Oriented     Current cognitive status: Alert/Oriented     Walking or Climbing Stairs Difficulty ambulation difficulty, requires equipment     Mobility Management rolling walker, straight cane     Dressing/Bathing Difficulty none     Home Accessibility not wheelchair accessible     Home Layout Able to live on 1st floor     Equipment Currently Used at Home walker, rolling;cane, straight;CPAP     Readmission within 30 days? Yes   surgery on 3/10/22, hospitalized again 3/17/22    Patient currently being followed by outpatient case management? No     Do you currently have service(s) that help you manage your care at home? No     Do you take prescription medications? Yes     Do you have prescription coverage? Yes     Coverage Medicare A & B     Do you have any problems affording any of your prescribed medications? No     Is the patient taking medications as prescribed? yes     Who is going to help you get home at discharge? Maria Luisa López, [phone], daughter     How do you get to doctors appointments? family or friend will provide     Are you on dialysis? No     Do you take coumadin? No     Discharge Plan A Home with family     Discharge Plan B Home with family     DME Needed Upon Discharge  none     Discharge Plan discussed with: Patient     Discharge Barriers Identified None                     Readmission Assessment (most recent)       Readmission Assessment - 04/09/22 1046          Readmission    Why were you hospitalized in the last 30 days? surgery on 3/10, related pain/symptoms brought her back on 3/17, related symptoms brought her back this stay (P)      Why were you readmitted? Related to previous admission (P)       When you left the hospital how did you feel? ready to go (P)      When you left the hospital where did you go? Home with Family (P)      Did patient/caregiver refused recommended DC plan? No (P)      Tell me about what happened between when you left the hospital and the day you returned. went about life as normal, started feeling intense indigestion and breast/chest pain (P)      When did you start not feeling well? 4/6, 4/7 (P)      Did you try to manage your symptoms your self? No (P)      Did you call anyone? No (P)      Why? had a follow up appointment on 4/7 (P)      Did you try to see or did see a doctor or nurse before you came? Yes (P)      Were you seen? Yes (P)      Did you have  a follow-up appointment on discharge? No (P)      Was this a planned readmission? No (P)

## 2022-04-09 NOTE — ASSESSMENT & PLAN NOTE
Reports compliant with medication, currently on metolazone and Demadex    Bronchial breath sounds bilaterally, bilateral LE edema  Chest imaging showed mild pulmonary vessel congestion/trace edema.  IV furosemide 60 mg x 1 now, followed by 40 mg b.i.d.  Hold Demadex for now  Daily weight, intake and output

## 2022-04-10 PROBLEM — Z90.5 H/O RIGHT NEPHRECTOMY: Status: ACTIVE | Noted: 2022-04-10

## 2022-04-12 RX ORDER — LEVOTHYROXINE SODIUM 50 UG/1
50 TABLET ORAL EVERY MORNING
Qty: 90 TABLET | Refills: 2 | Status: SHIPPED | OUTPATIENT
Start: 2022-04-12 | End: 2022-11-09 | Stop reason: SDUPTHER

## 2022-04-12 NOTE — TELEPHONE ENCOUNTER
No new care gaps identified.  Powered by [employer]. Reference number: [id number].   4/12/2022 8:02:10 AM CDT

## 2022-04-12 NOTE — TELEPHONE ENCOUNTER
Refill Routing Note   Medication(s) are not appropriate for processing by [facility] for the following reason(s):      - Patient has been seen in the ED/Hospital since the last PCP visit    [facility] action(s):  Defer          Medication reconciliation completed: No     Appointments  past 12m or future 3m with PCP    Date Provider   Last Visit   4/7/2022 Tony Sadler MD   Next Visit   6/9/2022 Tony Sadler MD   ED visits in past 90 days: 1        Note composed:12:23 PM 04/12/2022

## 2022-04-14 LAB
BACTERIA BLD CULT: NORMAL
BACTERIA BLD CULT: NORMAL

## 2022-04-26 ENCOUNTER — PES CALL (OUTPATIENT)
Dept: ADMINISTRATIVE | Facility: CLINIC | Age: 72
End: 2022-04-26
Payer: MEDICARE

## 2022-05-09 ENCOUNTER — OFFICE VISIT (OUTPATIENT)
Dept: CARDIOLOGY | Facility: CLINIC | Age: 72
End: 2022-05-09
Payer: MEDICARE

## 2022-05-09 VITALS
BODY MASS INDEX: 43.46 KG/M2 | HEART RATE: 72 BPM | WEIGHT: 276.88 LBS | SYSTOLIC BLOOD PRESSURE: 138 MMHG | DIASTOLIC BLOOD PRESSURE: 88 MMHG | HEIGHT: 67 IN

## 2022-05-09 DIAGNOSIS — Z79.899 LONG TERM CURRENT USE OF AMIODARONE: Chronic | ICD-10-CM

## 2022-05-09 DIAGNOSIS — I50.42 CHRONIC COMBINED SYSTOLIC AND DIASTOLIC HEART FAILURE: ICD-10-CM

## 2022-05-09 DIAGNOSIS — I48.0 PAROXYSMAL ATRIAL FIBRILLATION: Chronic | ICD-10-CM

## 2022-05-09 DIAGNOSIS — E66.01 OBESITY, CLASS III, BMI 40-49.9 (MORBID OBESITY): Chronic | ICD-10-CM

## 2022-05-09 DIAGNOSIS — I50.42 CHRONIC COMBINED SYSTOLIC AND DIASTOLIC HEART FAILURE: Primary | Chronic | ICD-10-CM

## 2022-05-09 DIAGNOSIS — I12.9 CKD STAGE 4 SECONDARY TO HYPERTENSION: Chronic | ICD-10-CM

## 2022-05-09 DIAGNOSIS — N18.4 CKD STAGE 4 SECONDARY TO HYPERTENSION: Chronic | ICD-10-CM

## 2022-05-09 DIAGNOSIS — E83.42 HYPOMAGNESEMIA: ICD-10-CM

## 2022-05-09 DIAGNOSIS — I34.0 NON-RHEUMATIC MITRAL REGURGITATION: ICD-10-CM

## 2022-05-09 PROCEDURE — 99999 PR PBB SHADOW E&M-EST. PATIENT-LVL IV: CPT | Mod: PBBFAC,,, | Performed by: INTERNAL MEDICINE

## 2022-05-09 PROCEDURE — 99214 OFFICE O/P EST MOD 30 MIN: CPT | Mod: PBBFAC,PO | Performed by: INTERNAL MEDICINE

## 2022-05-09 PROCEDURE — 99999 PR PBB SHADOW E&M-EST. PATIENT-LVL IV: ICD-10-PCS | Mod: PBBFAC,,, | Performed by: INTERNAL MEDICINE

## 2022-05-09 PROCEDURE — 99214 OFFICE O/P EST MOD 30 MIN: CPT | Mod: S$PBB,,, | Performed by: INTERNAL MEDICINE

## 2022-05-09 PROCEDURE — 99214 PR OFFICE/OUTPT VISIT, EST, LEVL IV, 30-39 MIN: ICD-10-PCS | Mod: S$PBB,,, | Performed by: INTERNAL MEDICINE

## 2022-05-09 RX ORDER — ISOSORBIDE DINITRATE AND HYDRALAZINE HYDROCHLORIDE 37.5; 2 MG/1; MG/1
1 TABLET ORAL 3 TIMES DAILY
Qty: 90 TABLET | Refills: 2 | Status: SHIPPED | OUTPATIENT
Start: 2022-05-09 | End: 2022-05-12 | Stop reason: ALTCHOICE

## 2022-05-09 RX ORDER — CALCITRIOL 0.25 UG/1
0.25 CAPSULE ORAL DAILY
COMMUNITY
Start: 2022-04-19 | End: 2022-11-09

## 2022-05-09 RX ORDER — ISOSORBIDE DINITRATE AND HYDRALAZINE HYDROCHLORIDE 37.5; 2 MG/1; MG/1
1 TABLET ORAL 3 TIMES DAILY
Qty: 90 TABLET | Refills: 2 | Status: SHIPPED | OUTPATIENT
Start: 2022-05-09 | End: 2022-05-09 | Stop reason: SDUPTHER

## 2022-05-09 RX ORDER — LANOLIN ALCOHOL/MO/W.PET/CERES
400 CREAM (GRAM) TOPICAL DAILY PRN
Qty: 30 TABLET | Refills: 0 | Status: SHIPPED | OUTPATIENT
Start: 2022-05-09

## 2022-05-09 NOTE — TELEPHONE ENCOUNTER
----- Message from Columba Hsu sent at 5/9/2022  4:37 PM CDT -----  Contact: [facility]  [phone]  Type:  Pharmacy Calling to Clarify an RX    Name of Caller:   Pharmacy Name:  [facility]  Prescription Name:  isosorbide-hydrALAZINE 20-37.5 mg (BIDIL) 20-37.5 mg Tab  What do they need to clarify?:  Its expensive and needs pa   Best Call Back Number:  [phone]  Additional Information: Pls call back and advise

## 2022-05-09 NOTE — PROGRESS NOTES
"Subjective:    Patient ID:  Juhi Taylor is a 71 y.o. female who presents for Chronic combined systolic and diastolic heart failure, Atrial Fibrillation, and Shortness of Breath        HPI    STATUS POST ER WITH CHEST PAIN SHORTNESS OF BREATH, WAS VOLUME OVERLOADED, CHEST X-RAY CARDIOMEGALY WITH MILD PULMONARY CONGESTION, ALSO CHF ADMISSION BEFORE, LABS AND RECORDS NOTED, ALL THIS SINCE NEPHRECTOMY AND WORSENING KIDNEY FUNCTION NOW STAGE IV CARE KIDNEY DISEASE, MIGHT NEED DIALYSIS, DECLINED TRANSPLANT, GFR 18, OFF HYDRALAZINE AND ENTRESTO B/O KIDNEY?, BP UP , SKIPS DIEURETICS AT TIMES, SEE ROS  Past Medical History:   Diagnosis Date    Anticoagulant long-term use     Arthritis     Breast cancer 2014    invasive lobular carcinoma    Cancer of kidney 11/2020    RIGHT KIDNEY CANCER    CHF (congestive heart failure)     Coronary artery disease dx 2005    Depression     Diabetes mellitus     Diastolic heart failure secondary to hypertension     Gout     Hyperlipemia     Hypertension     Hypertrophy of nasal turbinates     Kidney mass 2020    Right    Levoscoliosis     Lung nodule     left    Multiple thyroid nodules     NICOLE (obstructive sleep apnea)     uses C-PAP    Pulmonary hypertension      Past Surgical History:   Procedure Laterality Date    AORTOGRAPHY N/A 12/04/2020    Procedure: Aortogram;  Surgeon: Paul Pedersen MD;  Location: [facility] CATH;  Service: Cardiology;  Laterality: N/A;    CARDIAC CATHETERIZATION  12/2020    CHOLECYSTECTOMY      COLONOSCOPY      multi -last 2014     ESOPHAGOGASTRODUODENOSCOPY      2012     HAND SURGERY Right     LAPAROSCOPIC ROBOT-ASSISTED SURGICAL REMOVAL OF KIDNEY USING DA CHELLE XI Right 3/10/2022    Procedure: XI ROBOTIC NEPHRECTOMY- radical;  Surgeon: Rolando Ramirez MD;  Location: [facility] OR;  Service: Urology;  Laterality: Right;    MASTECTOMY W/ SENTINEL NODE BIOPSY Bilateral 01/21/2015    bilateral "dog ears"    NASAL SINUS SURGERY  2015 "    Dr Bryant FESS/cauterization turbinate     PARTIAL HYSTERECTOMY      PERCUTANEOUS TRANSLUMINAL BALLOON ANGIOPLASTY OF CORONARY ARTERY  2020    Procedure: Angioplasty-coronary;  Surgeon: Paul Pedersen MD;  Location: [facility] CATH;  Service: Cardiology;;    RENAL BIOPSY Right     2021 EJ    TUBAL LIGATION      ULTRASOUND GUIDANCE  2020    Procedure: Ultrasound Guidance;  Surgeon: Paul Pedersen MD;  [facility];  Service: Cardiology;;     Family History   Problem Relation Age of Onset    Breast cancer Mother     Stroke Father     Hypertension Father     Hepatitis Brother     Asthma Daughter     Birth defects Daughter         Nell's two children has cleft lips    Depression Daughter     Drug abuse Daughter     Learning disabilities Daughter     Mental illness Daughter     Breast cancer Maternal Aunt     Glaucoma Sister     Drug abuse Daughter     Macular degeneration Neg Hx     Retinal detachment Neg Hx      Social History     Socioeconomic History    Marital status: Single    Number of children: 4   Occupational History    Occupation: retired Psych aid    Tobacco Use    Smoking status: Former Smoker     Packs/day: 1.00     Years: 0.00     Pack years: 0.00     Types: Cigarettes     Quit date: 2016     Years since quittin.3    Smokeless tobacco: Never Used    Tobacco comment: quit    Substance and Sexual Activity    Alcohol use: No    Drug use: No    Sexual activity: Not Currently     Partners: Male       Review of patient's allergies indicates:   Allergen Reactions    Percocet [oxycodone-acetaminophen] Itching    Irbesartan Swelling    Januvia [sitagliptin]     Jardiance [empagliflozin]      Leg cramps    Lipitor [atorvastatin] Other (See Comments)     Severe leg pain    Linaclotide Other (See Comments) and Nausea And Vomiting     Does not remember    Lubiprostone Other (See Comments) and Palpitations     Does not remember        Current Outpatient Medications:     allopurinoL (ZYLOPRIM) 100 MG tablet, Take 1 tablet (100 mg total) by mouth once daily. (Patient taking differently: Take 300 mg by mouth every other day.), Disp: , Rfl:     amiodarone (PACERONE) 100 MG Tab, Take 1 tablet (100 mg total) by mouth once daily., Disp: 90 tablet, Rfl: 0    amLODIPine (NORVASC) 5 MG tablet, Take 2 tablets (10 mg total) by mouth nightly., Disp: 30 tablet, Rfl: 2    calcitRIOL (ROCALTROL) 0.25 MCG Cap, Take 0.25 mcg by mouth once daily., Disp: , Rfl:     carvediloL (COREG) 25 MG tablet, TAKE ONE TABLET BY MOUTH TWICE DAILY (Patient taking differently: Take 25 mg by mouth 2 (two) times daily with meals.), Disp: 180 tablet, Rfl: 2    ELIQUIS 5 mg Tab, TAKE ONE TABLET BY MOUTH TWICE DAILY, Disp: 60 tablet, Rfl: 0    levothyroxine (SYNTHROID) 50 MCG tablet, Take 1 tablet (50 mcg total) by mouth every morning. ON EMPTY STOMACH, Disp: 90 tablet, Rfl: 2    metOLazone (ZAROXOLYN) 2.5 MG tablet, Take 1 tablet (2.5 mg total) by mouth once daily., Disp: 30 tablet, Rfl: 0    montelukast (SINGULAIR) 10 mg tablet, TAKE ONE TABLET BY MOUTH EVERY EVENING FOR allergies (Patient taking differently: Take 10 mg by mouth every evening.), Disp: 90 tablet, Rfl: 3    nitroGLYCERIN (NITROSTAT) 0.4 MG SL tablet, Place 1 tablet (0.4 mg total) under the tongue every 5 (five) minutes as needed for Chest pain., Disp: 20 tablet, Rfl: 0    PRALUENT PEN 75 mg/mL PnIj, Inject 1 mL (75 mg total) into the skin every 14 (fourteen) days., Disp: 6 mL, Rfl: 3    torsemide (DEMADEX) 20 MG Tab, Take 1 tablet (20 mg total) by mouth once daily., Disp: 30 tablet, Rfl: 0    albuterol (PROVENTIL/VENTOLIN HFA) 90 mcg/actuation inhaler, Inhale 1-2 puffs into the lungs every 6 (six) hours as needed for Wheezing or Shortness of Breath. Rescue, Disp: 6.7 g, Rfl: 0    blood sugar diagnostic Strp, To check BG2 times daily,and PRN hypoglycemia  to use with insurance preferred meter  "Diagnosis Code: E11.65, Disp: 100 each, Rfl: 1    isosorbide-hydrALAZINE 20-37.5 mg (BIDIL) 20-37.5 mg Tab, Take 1 tablet by mouth 3 (three) times daily., Disp: 90 tablet, Rfl: 2    magnesium oxide (MAG-OX) 400 mg (241.3 mg magnesium) tablet, Take 1 tablet (400 mg total) by mouth daily as needed (cramping)., Disp: 30 tablet, Rfl: 0    pantoprazole (PROTONIX) 40 MG tablet, Take 1 tablet (40 mg total) by mouth once daily. for 14 days, Disp: 14 tablet, Rfl: 0    polyethylene glycol (GLYCOLAX) 17 gram/dose powder, Take 17 g by mouth 2 (two) times daily., Disp: 765 g, Rfl: 0    Review of Systems   Constitutional: Negative for chills, diaphoresis, fever and malaise/fatigue.   HENT: Negative.    Eyes: Negative.    Cardiovascular: Positive for dyspnea on exertion and leg swelling (WITH HIGH DOSE AMLODIPINE). Negative for chest pain, claudication, cyanosis, irregular heartbeat, near-syncope, orthopnea, palpitations, paroxysmal nocturnal dyspnea and syncope.   Respiratory: Negative for cough, hemoptysis, shortness of breath (LESS) and wheezing.    Endocrine: Negative.    Hematologic/Lymphatic: Negative for adenopathy. Does not bruise/bleed easily.   Skin: Negative for color change and rash.   Musculoskeletal: Negative for back pain (LESS) and falls.   Gastrointestinal: Negative for change in bowel habit, dysphagia, melena and nausea.   Genitourinary: Negative for dysuria and flank pain.   Neurological: Negative for focal weakness, headaches, light-headedness, loss of balance and weakness.   Psychiatric/Behavioral: Negative for altered mental status and memory loss.   Allergic/Immunologic: Negative.         Objective:      Vitals:    05/09/22 1412 05/09/22 1437   BP: (!) 176/89 138/88   Pulse: 72    Weight: 125.6 kg (276 lb 14.4 oz)    Height: 5' 7" (1.702 m)    PainSc: 0-No pain      Body mass index is 43.37 kg/m².    Physical Exam  Constitutional:       Appearance: She is obese.   HENT:      Head: Normocephalic and " atraumatic.   Eyes:      Extraocular Movements: Extraocular movements intact.      Conjunctiva/sclera: Conjunctivae normal.   Neck:      Vascular: No carotid bruit.   Cardiovascular:      Rate and Rhythm: Normal rate and regular rhythm.      Pulses: Normal pulses.           Carotid pulses are 2+ on the right side and 2+ on the left side.       Radial pulses are 2+ on the right side and 2+ on the left side.      Heart sounds: Murmur heard.    Systolic murmur is present with a grade of 1/6 at the lower left sternal border and apex.    No friction rub. No gallop.   Pulmonary:      Effort: Pulmonary effort is normal.      Breath sounds: Normal breath sounds. No rales.   Abdominal:      Palpations: Abdomen is soft.      Tenderness: There is no abdominal tenderness.   Musculoskeletal:      Cervical back: Neck supple.      Right lower leg: Edema (+1) present.      Left lower leg: Edema (+1) present.      Comments: USES A CANE   Skin:     Capillary Refill: Capillary refill takes less than 2 seconds.   Neurological:      General: No focal deficit present.      Mental Status: She is alert and oriented to person, place, and time.   Psychiatric:         Mood and Affect: Mood normal.         Behavior: Behavior normal.                 ..    Chemistry        Component Value Date/Time     04/09/2022 0345    K 3.9 04/09/2022 0345     04/09/2022 0345    CO2 27 04/09/2022 0345    BUN 52 (H) 04/09/2022 0345    CREATININE 2.9 (H) 04/09/2022 0345     (H) 04/09/2022 0345        Component Value Date/Time    CALCIUM 9.9 04/09/2022 0345    ALKPHOS 113 04/08/2022 1947    AST 20 04/08/2022 1947    ALT 29 04/08/2022 1947    BILITOT 0.5 04/08/2022 1947    ESTGFRAFRICA 18.3 (A) 04/09/2022 0345    EGFRNONAA 15.9 (A) 04/09/2022 0345            ..  Lab Results   Component Value Date    CHOL 140 08/16/2021    CHOL 153 04/14/2021    CHOL 223 (H) 12/01/2020     Lab Results   Component Value Date    HDL 54 08/16/2021    HDL 45  04/14/2021    HDL 46 12/01/2020     Lab Results   Component Value Date    LDLCALC 62.6 (L) 08/16/2021    LDLCALC 82.0 04/14/2021    LDLCALC 108.6 12/01/2020     Lab Results   Component Value Date    TRIG 117 08/16/2021    TRIG 130 04/14/2021    TRIG 342 (H) 12/01/2020     Lab Results   Component Value Date    CHOLHDL 38.6 08/16/2021    CHOLHDL 29.4 04/14/2021    CHOLHDL 20.6 12/01/2020     ..  Lab Results   Component Value Date    WBC 6.97 04/09/2022    HGB 9.2 (L) 04/09/2022    HCT 31.4 (L) 04/09/2022    MCV 85 04/09/2022     04/09/2022       Test(s) Reviewed  I have reviewed the following in detail:  [] Stress test   [] Angiography   [] Echocardiogram   [x] Labs   [x] Other:       Assessment:         ICD-10-CM ICD-9-CM   1. Chronic combined systolic and diastolic heart failure  I50.42 428.42   2. CKD stage 4 secondary to hypertension  I12.9 403.90    N18.4 585.4   3. Long term current use of amiodarone  Z79.899 V58.69   4. Non-rheumatic mitral regurgitation  I34.0 424.0   5. Paroxysmal atrial fibrillation  I48.0 427.31   6. Obesity, Class III, BMI 40-49.9 (morbid obesity)  E66.01 278.01   7. Hypomagnesemia  E83.42 275.2     Problem List Items Addressed This Visit        Cardiac/Vascular    Non-rheumatic mitral regurgitation    Long term current use of amiodarone    Paroxysmal atrial fibrillation       Renal/    CKD stage 4 secondary to hypertension    Hypomagnesemia    Relevant Medications    magnesium oxide (MAG-OX) 400 mg (241.3 mg magnesium) tablet       Endocrine    Obesity, Class III, BMI 40-49.9 (morbid obesity)      Other Visit Diagnoses     Chronic combined systolic and diastolic heart failure  (Chronic)   -  Primary    INTERMITTENT WORSENING    Relevant Medications    magnesium oxide (MAG-OX) 400 mg (241.3 mg magnesium) tablet           Plan:         ADD BIDIL IN THIS PATIENT WITH HEART FAILURE  WILL BENEFIT BEST FROM IT,, IF CP WILL CHECK NUCLEAR STRESS TEST, CLASS 2 HEART  FAILURE STABLE ANGINA NO ARRHYTHMIA NO TIA TYPE SYMPTOMS, DAILY WEIGHT EXPLAINED TO THE PATIENT 2 LB PER DAY 5 LB PER WEEK RULE, COMPLIANCE WITH MEDICATION AND DIURETICS AS NEEDED, NEEDS SIGNIFICANT WEIGHT LOSS, WILL HAVE INTERMITTENT OF VOLUME OVERLOAD BECAUSE OF SIGNIFICANT CKD, RETURN TO CLINIC IN, , 3 MO WITH LABS  Chronic combined systolic and diastolic heart failure  Comments:  INTERMITTENT WORSENING  Orders:  -     Discontinue: isosorbide-hydrALAZINE 20-37.5 mg (BIDIL) 20-37.5 mg Tab; Take 1 tablet by mouth 3 (three) times daily.  Dispense: 90 tablet; Refill: 2  -     magnesium oxide (MAG-OX) 400 mg (241.3 mg magnesium) tablet; Take 1 tablet (400 mg total) by mouth daily as needed (cramping).  Dispense: 30 tablet; Refill: 0    CKD stage 4 secondary to hypertension  Comments:  WORSENING    Long term current use of amiodarone    Non-rheumatic mitral regurgitation    Paroxysmal atrial fibrillation    Obesity, Class III, BMI 40-49.9 (morbid obesity)  Comments:  WEIGHT LOSS    Hypomagnesemia  -     magnesium oxide (MAG-OX) 400 mg (241.3 mg magnesium) tablet; Take 1 tablet (400 mg total) by mouth daily as needed (cramping).  Dispense: 30 tablet; Refill: 0    RTC Low level/low impact aerobic exercise 5x's/wk. Heart healthy diet and risk factor modification.    See labs and med orders.    Aerobic exercise 5x's/wk. Heart healthy diet and risk factor modification.    See labs and med orders.

## 2022-05-11 ENCOUNTER — TELEPHONE (OUTPATIENT)
Dept: PHARMACY | Facility: CLINIC | Age: 72
End: 2022-05-11
Payer: MEDICARE

## 2022-05-12 DIAGNOSIS — I50.42 CHRONIC COMBINED SYSTOLIC AND DIASTOLIC HEART FAILURE: ICD-10-CM

## 2022-05-12 RX ORDER — ISOSORBIDE DINITRATE AND HYDRALAZINE HYDROCHLORIDE 37.5; 2 MG/1; MG/1
1 TABLET ORAL 3 TIMES DAILY
Qty: 90 TABLET | Refills: 2 | Status: CANCELLED | OUTPATIENT
Start: 2022-05-12 | End: 2023-05-12

## 2022-05-12 RX ORDER — ISOSORBIDE DINITRATE 20 MG/1
20 TABLET ORAL 2 TIMES DAILY
Qty: 60 TABLET | Refills: 2 | Status: SHIPPED | OUTPATIENT
Start: 2022-05-12 | End: 2022-10-11

## 2022-05-12 RX ORDER — HYDRALAZINE HYDROCHLORIDE 50 MG/1
50 TABLET, FILM COATED ORAL 2 TIMES DAILY
Qty: 60 TABLET | Refills: 2 | Status: SHIPPED | OUTPATIENT
Start: 2022-05-12 | End: 2022-08-19 | Stop reason: SINTOL

## 2022-05-12 RX ORDER — HYDRALAZINE HYDROCHLORIDE 50 MG/1
50 TABLET, FILM COATED ORAL 2 TIMES DAILY
Qty: 60 TABLET | Refills: 5 | Status: CANCELLED | OUTPATIENT
Start: 2022-05-12 | End: 2023-05-12

## 2022-05-12 RX ORDER — ISOSORBIDE DINITRATE 20 MG/1
20 TABLET ORAL 2 TIMES DAILY
Qty: 60 TABLET | Refills: 5 | Status: CANCELLED | OUTPATIENT
Start: 2022-05-12 | End: 2023-05-12

## 2022-05-30 ENCOUNTER — PATIENT OUTREACH (OUTPATIENT)
Dept: ADMINISTRATIVE | Facility: HOSPITAL | Age: 72
End: 2022-05-30
Payer: MEDICARE

## 2022-05-30 NOTE — PROGRESS NOTES
Non-compliant report chart audits for HYPERTENSION MANAGEMENT    Outreach to patient in reference to hypertension management    RE:  Patient hypertension management    Pt complaint cardio appt 5/9/2022    Outreach:  Hypertension Management

## 2022-06-08 ENCOUNTER — LAB VISIT (OUTPATIENT)
Dept: LAB | Facility: HOSPITAL | Age: 72
End: 2022-06-08
Attending: INTERNAL MEDICINE
Payer: MEDICARE

## 2022-06-08 DIAGNOSIS — E11.22 CKD STAGE 3 SECONDARY TO DIABETES: Chronic | ICD-10-CM

## 2022-06-08 DIAGNOSIS — I50.42 CHRONIC COMBINED SYSTOLIC AND DIASTOLIC HEART FAILURE: Chronic | ICD-10-CM

## 2022-06-08 DIAGNOSIS — N18.30 CKD STAGE 3 SECONDARY TO DIABETES: Chronic | ICD-10-CM

## 2022-06-08 LAB
ANION GAP SERPL CALC-SCNC: 11 MMOL/L (ref 8–16)
BUN SERPL-MCNC: 72 MG/DL (ref 8–23)
CALCIUM SERPL-MCNC: 10.6 MG/DL (ref 8.7–10.5)
CHLORIDE SERPL-SCNC: 100 MMOL/L (ref 95–110)
CO2 SERPL-SCNC: 28 MMOL/L (ref 23–29)
CREAT SERPL-MCNC: 3.5 MG/DL (ref 0.5–1.4)
EST. GFR  (AFRICAN AMERICAN): 14.6 ML/MIN/1.73 M^2
EST. GFR  (NON AFRICAN AMERICAN): 12.7 ML/MIN/1.73 M^2
GLUCOSE SERPL-MCNC: 159 MG/DL (ref 70–110)
POTASSIUM SERPL-SCNC: 4.5 MMOL/L (ref 3.5–5.1)
SODIUM SERPL-SCNC: 139 MMOL/L (ref 136–145)

## 2022-06-08 PROCEDURE — 80048 BASIC METABOLIC PNL TOTAL CA: CPT | Performed by: INTERNAL MEDICINE

## 2022-06-08 PROCEDURE — 36415 COLL VENOUS BLD VENIPUNCTURE: CPT | Mod: PN | Performed by: INTERNAL MEDICINE

## 2022-06-09 ENCOUNTER — OFFICE VISIT (OUTPATIENT)
Dept: FAMILY MEDICINE | Facility: CLINIC | Age: 72
End: 2022-06-09
Payer: MEDICARE

## 2022-06-09 VITALS
TEMPERATURE: 98 F | OXYGEN SATURATION: 96 % | BODY MASS INDEX: 43.68 KG/M2 | HEART RATE: 79 BPM | SYSTOLIC BLOOD PRESSURE: 138 MMHG | WEIGHT: 278.88 LBS | DIASTOLIC BLOOD PRESSURE: 76 MMHG | RESPIRATION RATE: 18 BRPM

## 2022-06-09 DIAGNOSIS — B37.31 YEAST INFECTION OF THE VAGINA: Primary | ICD-10-CM

## 2022-06-09 DIAGNOSIS — N18.4 HYPERTENSIVE HEART AND KIDNEY DISEASE WITH HF AND WITH CKD STAGE IV: ICD-10-CM

## 2022-06-09 DIAGNOSIS — R80.9 TYPE 2 DIABETES MELLITUS WITH MICROALBUMINURIA, WITHOUT LONG-TERM CURRENT USE OF INSULIN: ICD-10-CM

## 2022-06-09 DIAGNOSIS — I15.0 RENOVASCULAR HYPERTENSION: ICD-10-CM

## 2022-06-09 DIAGNOSIS — I27.20 PULMONARY HYPERTENSION: ICD-10-CM

## 2022-06-09 DIAGNOSIS — E11.29 TYPE 2 DIABETES MELLITUS WITH MICROALBUMINURIA, WITHOUT LONG-TERM CURRENT USE OF INSULIN: ICD-10-CM

## 2022-06-09 DIAGNOSIS — C64.1 RENAL CELL CARCINOMA OF RIGHT KIDNEY: ICD-10-CM

## 2022-06-09 DIAGNOSIS — N18.5 CHRONIC KIDNEY DISEASE (CKD), STAGE V: ICD-10-CM

## 2022-06-09 DIAGNOSIS — I13.0 HYPERTENSIVE HEART AND KIDNEY DISEASE WITH HF AND WITH CKD STAGE IV: ICD-10-CM

## 2022-06-09 PROCEDURE — 99215 OFFICE O/P EST HI 40 MIN: CPT | Mod: PBBFAC,PN | Performed by: INTERNAL MEDICINE

## 2022-06-09 PROCEDURE — 99999 PR PBB SHADOW E&M-EST. PATIENT-LVL V: ICD-10-PCS | Mod: PBBFAC,,, | Performed by: INTERNAL MEDICINE

## 2022-06-09 PROCEDURE — 99214 PR OFFICE/OUTPT VISIT, EST, LEVL IV, 30-39 MIN: ICD-10-PCS | Mod: S$PBB,,, | Performed by: INTERNAL MEDICINE

## 2022-06-09 PROCEDURE — 99214 OFFICE O/P EST MOD 30 MIN: CPT | Mod: S$PBB,,, | Performed by: INTERNAL MEDICINE

## 2022-06-09 PROCEDURE — 99999 PR PBB SHADOW E&M-EST. PATIENT-LVL V: CPT | Mod: PBBFAC,,, | Performed by: INTERNAL MEDICINE

## 2022-06-09 RX ORDER — MUPIROCIN 20 MG/G
OINTMENT TOPICAL DAILY PRN
COMMUNITY
Start: 2021-12-30 | End: 2022-06-09

## 2022-06-09 RX ORDER — TERCONAZOLE 4 MG/G
1 CREAM VAGINAL NIGHTLY
Qty: 45 G | Refills: 0 | Status: SHIPPED | OUTPATIENT
Start: 2022-06-09 | End: 2022-08-19

## 2022-06-09 RX ORDER — TERCONAZOLE 80 MG/1
80 SUPPOSITORY VAGINAL NIGHTLY
Qty: 3 SUPPOSITORY | Refills: 0 | Status: SHIPPED | OUTPATIENT
Start: 2022-06-09 | End: 2022-06-12

## 2022-06-09 NOTE — PROGRESS NOTES
Subjective:   Note in      Patient ID: Juhi Taylor is a 71 y.o. female.    Gyn:  Hysterectomy partical & 1 ovary   MMG:  History of breast cancer.  3/2021 MRI breast done negative  Dexa:  02/2019 osteopenia  Colonoscopy:   yes 2014 or later Dr Payne    Immunizations: Flu:  Refuses Tdap:  Not sure Pneumovax:  Refused Prevnar 13:  Refused Shingles: Covid: defers recommend w medical hx   Smoker:  Former   [facility]: [facility]     Chief Complaint: Follow-up and Vaginitis    Vag yeast infection - having issues for 1 wk - OTC not help    Vaginal Itching  Pertinent negatives include no hematuria.       Renal cell cancer: s/p nephrectomy.   GFR dropped currently at 14. followed by Renal.        A Fib: dx 2021:  Stable Rx Eliquis, ASA 81, Amiodarone 100 -- mgmt w Cardio  //(SE: Bildil chest pains)     Reactive depression: off meds - doing ok right now   Prev tired the Cymbalta few days bad nausea stopped.   CAD: stable. No CP// hx 3 stents Dec 2020. Off Plavix //Dr Kern      CHF/pulmonary hypertension:  Pressure 51, EF 35-40%, DD grade 2.  + chronic shortness of breath reduced exercise tolerance. // Rx Entresto, Lasix.   Type 2 DM:  A1c 5.5 off med currently //off med right now// off Metformin//see's Endo Rx; Jardince.   SE: diarrhea metformin   HTN:  Controlled Rx not taking Hydralazine, Norvasc 5, (SE: Norvasc 10 leg swelling)   HLD:  Controlled; Goal <70// Rx Praluent   CKD stage 4  Gfr 14 worse since nephrectomy + microalbumin positive. Previous stage 3 GFR 40's  mgmt Renal [facility] Dr Martinez   Anemia chronic disease:  hb 10.9 iron Sat 12  -taking iron daily.    Elevated  hx   Hypothyroid: controlled Rx Levo 50   Vitamin-D def: missing doses Vit D2   Sleep Apnea: + CPAP/ using every day all night and w naps. Still working on diff masks that she likes.   Sleep testing: AHI 43 - auto bipap// 1/16/18: AHI 37.9 auto CPAP 8-20 mmHg .   Osteoarthritis multi joints: + leg pains, knees hips lower  back. Diff walking. Sciatic right and radiation to hip _Gap helped a little - .  Gout: Rx 300 mg every other day.  Rx Allopurinol 300. Highest 8.      Review of Systems:  Review of Systems   Constitutional: Negative for appetite change.   HENT: Negative for nosebleeds.    Eyes: Negative for pain.   Respiratory: Negative for choking.    Gastrointestinal: Negative for blood in stool.   Genitourinary: Negative for hematuria.   Skin: Negative for pallor.   Neurological: Negative for facial asymmetry.   Hematological: Does not bruise/bleed easily.   Psychiatric/Behavioral: Positive for dysphoric mood. Negative for confusion and sleep disturbance.       Objective:     Vitals:    06/09/22 1042   BP: 138/76   Pulse: 79   Resp: 18   Temp: 98.3 °F (36.8 °C)   SpO2: 96%   Weight: 126.5 kg (278 lb 14.1 oz)          Physical Exam  Vitals reviewed.   Constitutional:       Appearance: Normal appearance. She is obese.   HENT:      Head: Normocephalic and atraumatic.      Mouth/Throat:      Pharynx: Oropharynx is clear.   Eyes:      Extraocular Movements: Extraocular movements intact.      Conjunctiva/sclera: Conjunctivae normal.      Pupils: Pupils are equal, round, and reactive to light.   Cardiovascular:      Rate and Rhythm: Normal rate and regular rhythm.      Heart sounds: Murmur heard.   Pulmonary:      Effort: Pulmonary effort is normal.      Breath sounds: Normal breath sounds.   Abdominal:      General: Bowel sounds are normal.      Palpations: Abdomen is soft.   Musculoskeletal:         General: Normal range of motion.      Cervical back: Normal range of motion and neck supple.      Right lower leg: No edema.      Left lower leg: No edema.   Skin:     General: Skin is warm and dry.   Neurological:      General: No focal deficit present.      Mental Status: She is alert and oriented to person, place, and time.   Psychiatric:         Mood and Affect: Mood normal.         Medication List with Changes/Refills   New  Medications    TERCONAZOLE (TERAZOL 3) 80 MG VAGINAL SUPPOSITORY    Place 1 suppository (80 mg total) vaginally every evening. for 3 days    TERCONAZOLE (TERAZOL 7) 0.4 % CREA    Place 1 applicator vaginally every evening.   Current Medications    ALBUTEROL (PROVENTIL/VENTOLIN HFA) 90 MCG/ACTUATION INHALER    Inhale 1-2 puffs into the lungs every 6 (six) hours as needed for Wheezing or Shortness of Breath. Rescue    ALLOPURINOL (ZYLOPRIM) 100 MG TABLET    Take 1 tablet (100 mg total) by mouth once daily.    AMIODARONE (PACERONE) 100 MG TAB    Take 1 tablet (100 mg total) by mouth once daily.    AMLODIPINE (NORVASC) 5 MG TABLET    Take 2 tablets (10 mg total) by mouth nightly.    BLOOD SUGAR DIAGNOSTIC STRP    To check BG2 times daily,and PRN hypoglycemia  to use with insurance preferred meter Diagnosis Code: E11.65    CALCITRIOL (ROCALTROL) 0.25 MCG CAP    Take 0.25 mcg by mouth once daily.    CARVEDILOL (COREG) 25 MG TABLET    TAKE ONE TABLET BY MOUTH TWICE DAILY    ELIQUIS 5 MG TAB    TAKE ONE TABLET BY MOUTH TWICE DAILY    HYDRALAZINE (APRESOLINE) 50 MG TABLET    Take 1 tablet (50 mg total) by mouth 2 (two) times a day.    ISOSORBIDE DINITRATE (ISORDIL) 20 MG TABLET    Take 1 tablet (20 mg total) by mouth 2 (two) times daily.    LEVOTHYROXINE (SYNTHROID) 50 MCG TABLET    Take 1 tablet (50 mcg total) by mouth every morning. ON EMPTY STOMACH    MAGNESIUM OXIDE (MAG-OX) 400 MG (241.3 MG MAGNESIUM) TABLET    Take 1 tablet (400 mg total) by mouth daily as needed (cramping).    METOLAZONE (ZAROXOLYN) 2.5 MG TABLET    Take 1 tablet (2.5 mg total) by mouth once daily.    MONTELUKAST (SINGULAIR) 10 MG TABLET    TAKE ONE TABLET BY MOUTH EVERY EVENING FOR allergies    NITROGLYCERIN (NITROSTAT) 0.4 MG SL TABLET    Place 1 tablet (0.4 mg total) under the tongue every 5 (five) minutes as needed for Chest pain.    PANTOPRAZOLE (PROTONIX) 40 MG TABLET    Take 1 tablet (40 mg total) by mouth once daily. for 14 days    PRALUENT  PEN 75 MG/ML PNIJ    Inject 1 mL (75 mg total) into the skin every 14 (fourteen) days.    TORSEMIDE (DEMADEX) 20 MG TAB    Take 1 tablet (20 mg total) by mouth once daily.   Discontinued Medications    MUPIROCIN (BACTROBAN) 2 % OINTMENT    daily as needed.    POLYETHYLENE GLYCOL (GLYCOLAX) 17 GRAM/DOSE POWDER    Take 17 g by mouth 2 (two) times daily.       Assessment & Plan:     Yeast infection of the vagina    Hypertensive heart and kidney disease with HF and with CKD stage IV    Type 2 diabetes mellitus with microalbuminuria, without long-term current use of insulin  -     Hemoglobin A1C; Future; Expected date: 06/09/2022    Renal cell carcinoma of right kidney    Pulmonary hypertension    Renovascular hypertension    Other orders  -     terconazole (TERAZOL 3) 80 mg vaginal suppository; Place 1 suppository (80 mg total) vaginally every evening. for 3 days  Dispense: 3 suppository; Refill: 0  -     terconazole (TERAZOL 7) 0.4 % Crea; Place 1 applicator vaginally every evening.  Dispense: 45 g; Refill: 0        Continue to work on regular exercise, maintain healthy weight, balanced diet. Avoid unhealthy habits: smoking, excessive alcohol intake.

## 2022-07-01 ENCOUNTER — TELEPHONE (OUTPATIENT)
Dept: FAMILY MEDICINE | Facility: CLINIC | Age: 72
End: 2022-07-01
Payer: MEDICARE

## 2022-07-07 ENCOUNTER — PATIENT MESSAGE (OUTPATIENT)
Dept: CARDIOLOGY | Facility: CLINIC | Age: 72
End: 2022-07-07
Payer: MEDICARE

## 2022-07-07 RX ORDER — AMIODARONE HYDROCHLORIDE 100 MG/1
100 TABLET ORAL DAILY
Qty: 90 TABLET | Refills: 0 | Status: SHIPPED | OUTPATIENT
Start: 2022-07-07 | End: 2022-11-09

## 2022-07-15 ENCOUNTER — LAB VISIT (OUTPATIENT)
Dept: LAB | Facility: HOSPITAL | Age: 72
End: 2022-07-15
Payer: MEDICARE

## 2022-07-15 DIAGNOSIS — R25.2 CRAMPS, EXTREMITY: ICD-10-CM

## 2022-07-15 DIAGNOSIS — R80.9 TYPE 2 DIABETES MELLITUS WITH MICROALBUMINURIA, WITHOUT LONG-TERM CURRENT USE OF INSULIN: ICD-10-CM

## 2022-07-15 DIAGNOSIS — M10.9 GOUT: Primary | ICD-10-CM

## 2022-07-15 DIAGNOSIS — E55.9 VITAMIN D DEFICIENCY: ICD-10-CM

## 2022-07-15 DIAGNOSIS — E11.29 TYPE 2 DIABETES MELLITUS WITH MICROALBUMINURIA, WITHOUT LONG-TERM CURRENT USE OF INSULIN: ICD-10-CM

## 2022-07-15 DIAGNOSIS — N18.4 CHRONIC KIDNEY DISEASE, STAGE 4, SEVERELY DECREASED GFR: ICD-10-CM

## 2022-07-15 LAB
ALBUMIN SERPL BCP-MCNC: 3.4 G/DL (ref 3.5–5.2)
ALBUMIN/CREAT UR: 362.1 UG/MG (ref 0–30)
ANION GAP SERPL CALC-SCNC: 7 MMOL/L (ref 8–16)
BASOPHILS # BLD AUTO: 0.02 K/UL (ref 0–0.2)
BASOPHILS NFR BLD: 0.3 % (ref 0–1.9)
BILIRUB UR QL STRIP: NEGATIVE
BUN SERPL-MCNC: 57 MG/DL (ref 8–23)
CALCIUM SERPL-MCNC: 10.6 MG/DL (ref 8.7–10.5)
CHLORIDE SERPL-SCNC: 100 MMOL/L (ref 95–110)
CLARITY UR REFRACT.AUTO: CLEAR
CO2 SERPL-SCNC: 31 MMOL/L (ref 23–29)
COLOR UR AUTO: NORMAL
CREAT SERPL-MCNC: 3.2 MG/DL (ref 0.5–1.4)
CREAT UR-MCNC: 66 MG/DL (ref 15–325)
DIFFERENTIAL METHOD: ABNORMAL
EOSINOPHIL # BLD AUTO: 0.3 K/UL (ref 0–0.5)
EOSINOPHIL NFR BLD: 3.9 % (ref 0–8)
ERYTHROCYTE [DISTWIDTH] IN BLOOD BY AUTOMATED COUNT: 14.6 % (ref 11.5–14.5)
EST. GFR  (AFRICAN AMERICAN): 16.2 ML/MIN/1.73 M^2
EST. GFR  (NON AFRICAN AMERICAN): 14 ML/MIN/1.73 M^2
ESTIMATED AVG GLUCOSE: 157 MG/DL (ref 68–131)
GLUCOSE SERPL-MCNC: 164 MG/DL (ref 70–110)
GLUCOSE UR QL STRIP: NEGATIVE
HBA1C MFR BLD: 7.1 % (ref 4–5.6)
HCT VFR BLD AUTO: 35.2 % (ref 37–48.5)
HGB BLD-MCNC: 10.5 G/DL (ref 12–16)
HGB UR QL STRIP: NEGATIVE
IMM GRANULOCYTES # BLD AUTO: 0.01 K/UL (ref 0–0.04)
IMM GRANULOCYTES NFR BLD AUTO: 0.1 % (ref 0–0.5)
KETONES UR QL STRIP: NEGATIVE
LEUKOCYTE ESTERASE UR QL STRIP: NEGATIVE
LYMPHOCYTES # BLD AUTO: 2.6 K/UL (ref 1–4.8)
LYMPHOCYTES NFR BLD: 37.2 % (ref 18–48)
MAGNESIUM SERPL-MCNC: 2.3 MG/DL (ref 1.6–2.6)
MCH RBC QN AUTO: 25.7 PG (ref 27–31)
MCHC RBC AUTO-ENTMCNC: 29.8 G/DL (ref 32–36)
MCV RBC AUTO: 86 FL (ref 82–98)
MICROALBUMIN UR DL<=1MG/L-MCNC: 239 UG/ML
MONOCYTES # BLD AUTO: 0.6 K/UL (ref 0.3–1)
MONOCYTES NFR BLD: 8.9 % (ref 4–15)
NEUTROPHILS # BLD AUTO: 3.5 K/UL (ref 1.8–7.7)
NEUTROPHILS NFR BLD: 49.6 % (ref 38–73)
NITRITE UR QL STRIP: NEGATIVE
NRBC BLD-RTO: 0 /100 WBC
PH UR STRIP: 7 [PH] (ref 5–8)
PHOSPHATE SERPL-MCNC: 3.8 MG/DL (ref 2.7–4.5)
PLATELET # BLD AUTO: 289 K/UL (ref 150–450)
PMV BLD AUTO: 10.1 FL (ref 9.2–12.9)
POTASSIUM SERPL-SCNC: 4.6 MMOL/L (ref 3.5–5.1)
PROT UR QL STRIP: NEGATIVE
PTH-INTACT SERPL-MCNC: 282.6 PG/ML (ref 9–77)
RBC # BLD AUTO: 4.09 M/UL (ref 4–5.4)
SODIUM SERPL-SCNC: 138 MMOL/L (ref 136–145)
SP GR UR STRIP: 1.01 (ref 1–1.03)
URATE SERPL-MCNC: 8.7 MG/DL (ref 2.4–5.7)
URN SPEC COLLECT METH UR: NORMAL
WBC # BLD AUTO: 7.1 K/UL (ref 3.9–12.7)

## 2022-07-15 PROCEDURE — 82043 UR ALBUMIN QUANTITATIVE: CPT | Performed by: INTERNAL MEDICINE

## 2022-07-15 PROCEDURE — 83970 ASSAY OF PARATHORMONE: CPT | Performed by: INTERNAL MEDICINE

## 2022-07-15 PROCEDURE — 83036 HEMOGLOBIN GLYCOSYLATED A1C: CPT | Performed by: INTERNAL MEDICINE

## 2022-07-15 PROCEDURE — 84550 ASSAY OF BLOOD/URIC ACID: CPT | Performed by: INTERNAL MEDICINE

## 2022-07-15 PROCEDURE — 85025 COMPLETE CBC W/AUTO DIFF WBC: CPT | Performed by: INTERNAL MEDICINE

## 2022-07-15 PROCEDURE — 80069 RENAL FUNCTION PANEL: CPT | Performed by: INTERNAL MEDICINE

## 2022-07-15 PROCEDURE — 36415 COLL VENOUS BLD VENIPUNCTURE: CPT | Mod: PN | Performed by: INTERNAL MEDICINE

## 2022-07-15 PROCEDURE — 83735 ASSAY OF MAGNESIUM: CPT | Performed by: INTERNAL MEDICINE

## 2022-07-15 PROCEDURE — 82570 ASSAY OF URINE CREATININE: CPT | Performed by: INTERNAL MEDICINE

## 2022-07-15 PROCEDURE — 81003 URINALYSIS AUTO W/O SCOPE: CPT | Performed by: INTERNAL MEDICINE

## 2022-07-18 NOTE — TELEPHONE ENCOUNTER
No new care gaps identified.  [facility] Embedded Care Gaps. Reference number: [id number]. 7/18/2022   3:17:14 PM CDT

## 2022-07-19 RX ORDER — ALLOPURINOL 300 MG/1
TABLET ORAL
Qty: 90 TABLET | Refills: 2 | OUTPATIENT
Start: 2022-07-19

## 2022-07-19 NOTE — TELEPHONE ENCOUNTER
Refill Decision Note   Juhi Brandon  is requesting a refill authorization.  Brief Assessment and Rationale for Refill:  Quick Discontinue     Medication Therapy Plan:  Allopurinol use is now 100mg qod, not appropriate to renew this 300mg    Medication Reconciliation Completed: No   Comments:     No Care Gaps recommended.     Note composed:3:06 PM 07/19/2022

## 2022-08-01 ENCOUNTER — PES CALL (OUTPATIENT)
Dept: ADMINISTRATIVE | Facility: CLINIC | Age: 72
End: 2022-08-01
Payer: MEDICARE

## 2022-08-01 ENCOUNTER — PATIENT MESSAGE (OUTPATIENT)
Dept: FAMILY MEDICINE | Facility: CLINIC | Age: 72
End: 2022-08-01
Payer: MEDICARE

## 2022-08-01 RX ORDER — ALLOPURINOL 300 MG/1
300 TABLET ORAL EVERY OTHER DAY
Qty: 45 TABLET | Refills: 2 | Status: SHIPPED | OUTPATIENT
Start: 2022-08-01 | End: 2022-11-09 | Stop reason: SDUPTHER

## 2022-08-01 NOTE — TELEPHONE ENCOUNTER
No new care gaps identified.  [facility] Embedded Care Gaps. Reference number: [id number]. 8/01/2022   8:17:26 AM CDT

## 2022-08-02 NOTE — TELEPHONE ENCOUNTER
Refill Routing Note   Medication(s) are not appropriate for processing by [facility] for the following reason(s):      - Patient states taking requested medication(s) differently than prescribed    [facility] action(s):  Defer          Medication reconciliation completed: No     Appointments  past 12m or future 3m with PCP    Date Provider   Last Visit   6/9/2022 Tony Sadler MD   Next Visit   Visit date not found Tony Sadler MD   ED visits in past 90 days: 0        Note composed:7:31 PM 08/01/2022

## 2022-08-19 ENCOUNTER — OFFICE VISIT (OUTPATIENT)
Dept: CARDIOLOGY | Facility: CLINIC | Age: 72
End: 2022-08-19
Payer: MEDICARE

## 2022-08-19 VITALS
BODY MASS INDEX: 45.99 KG/M2 | WEIGHT: 293 LBS | DIASTOLIC BLOOD PRESSURE: 70 MMHG | HEIGHT: 67 IN | SYSTOLIC BLOOD PRESSURE: 138 MMHG | HEART RATE: 52 BPM

## 2022-08-19 DIAGNOSIS — N18.4 CKD (CHRONIC KIDNEY DISEASE) STAGE 4, GFR 15-29 ML/MIN: Chronic | ICD-10-CM

## 2022-08-19 DIAGNOSIS — I25.10 CORONARY ARTERY DISEASE INVOLVING NATIVE CORONARY ARTERY OF NATIVE HEART WITHOUT ANGINA PECTORIS: Chronic | ICD-10-CM

## 2022-08-19 DIAGNOSIS — E66.01 OBESITY, CLASS III, BMI 40-49.9 (MORBID OBESITY): Chronic | ICD-10-CM

## 2022-08-19 DIAGNOSIS — I50.42 CHRONIC COMBINED SYSTOLIC AND DIASTOLIC HEART FAILURE: Primary | Chronic | ICD-10-CM

## 2022-08-19 DIAGNOSIS — Z79.899 LONG TERM CURRENT USE OF AMIODARONE: Chronic | ICD-10-CM

## 2022-08-19 PROCEDURE — 99214 OFFICE O/P EST MOD 30 MIN: CPT | Mod: S$PBB,,, | Performed by: INTERNAL MEDICINE

## 2022-08-19 PROCEDURE — 99214 PR OFFICE/OUTPT VISIT, EST, LEVL IV, 30-39 MIN: ICD-10-PCS | Mod: S$PBB,,, | Performed by: INTERNAL MEDICINE

## 2022-08-19 PROCEDURE — 99999 PR PBB SHADOW E&M-EST. PATIENT-LVL III: CPT | Mod: PBBFAC,,, | Performed by: INTERNAL MEDICINE

## 2022-08-19 PROCEDURE — 99999 PR PBB SHADOW E&M-EST. PATIENT-LVL III: ICD-10-PCS | Mod: PBBFAC,,, | Performed by: INTERNAL MEDICINE

## 2022-08-19 PROCEDURE — 99213 OFFICE O/P EST LOW 20 MIN: CPT | Mod: PBBFAC,PO | Performed by: INTERNAL MEDICINE

## 2022-08-19 RX ORDER — NITROGLYCERIN 0.4 MG/1
0.4 TABLET SUBLINGUAL EVERY 5 MIN PRN
Qty: 25 TABLET | Refills: 0 | Status: SHIPPED | OUTPATIENT
Start: 2022-08-19 | End: 2024-03-27

## 2022-08-19 NOTE — PROGRESS NOTES
"Subjective:    Patient ID:  Juhi Taylor is a 72 y.o. female who presents for Chronic combined systolic and diastolic heart failure        HPI  RECENT  LABS CREATININE 3.2 GFR 16, HB A1C 7.1%, BP 'S /80'S,NOT TAKING METOLAZONE REGULARLY, NOT TAKING HYDRALAZINE B/O CP, , SEE ROS    Past Medical History:   Diagnosis Date    Anticoagulant long-term use     Arthritis     Breast cancer 2014    invasive lobular carcinoma    Cancer of kidney 11/2020    RIGHT KIDNEY CANCER    CHF (congestive heart failure)     Coronary artery disease dx 2005    Depression     Diabetes mellitus     Diastolic heart failure secondary to hypertension     Gout     Hyperlipemia     Hypertension     Hypertrophy of nasal turbinates     Kidney mass 2020    Right    Levoscoliosis     Lung nodule     left    Multiple thyroid nodules     NICOLE (obstructive sleep apnea)     uses C-PAP    Pulmonary hypertension      Past Surgical History:   Procedure Laterality Date    AORTOGRAPHY N/A 12/04/2020    Procedure: Aortogram;  Surgeon: Paul Pedersen MD;  Location: [facility] CATH;  Service: Cardiology;  Laterality: N/A;    CARDIAC CATHETERIZATION  12/2020    CHOLECYSTECTOMY      COLONOSCOPY      multi -last 2014     ESOPHAGOGASTRODUODENOSCOPY      2012     HAND SURGERY Right     LAPAROSCOPIC ROBOT-ASSISTED SURGICAL REMOVAL OF KIDNEY USING DA CHELLE XI Right 3/10/2022    Procedure: XI ROBOTIC NEPHRECTOMY- radical;  Surgeon: Rolando Ramirez MD;  Location: [facility] OR;  Service: Urology;  Laterality: Right;    MASTECTOMY W/ SENTINEL NODE BIOPSY Bilateral 01/21/2015    bilateral "dog ears"    NASAL SINUS SURGERY  2015    Dr Bryant FESS/cauterization turbinate     PARTIAL HYSTERECTOMY  1989    PERCUTANEOUS TRANSLUMINAL BALLOON ANGIOPLASTY OF CORONARY ARTERY  12/04/2020    Procedure: Angioplasty-coronary;  Surgeon: Paul Pedersen MD;  Location: [facility] CATH;  Service: Cardiology;;    RENAL BIOPSY Right     9/20/2021 EJ    TUBAL " LIGATION      ULTRASOUND GUIDANCE  2020    Procedure: Ultrasound Guidance;  Surgeon: Paul Pedersen MD;  Location: [facility];  Service: Cardiology;;     Family History   Problem Relation Age of Onset    Breast cancer Mother     Stroke Father     Hypertension Father     Hepatitis Brother     Asthma Daughter     Birth defects Daughter         Nell's two children has cleft lips    Depression Daughter     Drug abuse Daughter     Learning disabilities Daughter     Mental illness Daughter     Breast cancer Maternal Aunt     Glaucoma Sister     Drug abuse Daughter     Macular degeneration Neg Hx     Retinal detachment Neg Hx      Social History     Socioeconomic History    Marital status: Single    Number of children: 4   Occupational History    Occupation: retired Psych aid    Tobacco Use    Smoking status: Former Smoker     Packs/day: 1.00     Years: 0.00     Pack years: 0.00     Types: Cigarettes     Quit date: 2016     Years since quittin.6    Smokeless tobacco: Never Used    Tobacco comment: quit    Substance and Sexual Activity    Alcohol use: No    Drug use: No    Sexual activity: Not Currently     Partners: Male       Review of patient's allergies indicates:   Allergen Reactions    Percocet [oxycodone-acetaminophen] Itching    Irbesartan Swelling    Januvia [sitagliptin]     Jardiance [empagliflozin]      Leg cramps    Lipitor [atorvastatin] Other (See Comments)     Severe leg pain    Linaclotide Other (See Comments) and Nausea And Vomiting     Does not remember    Lubiprostone Other (See Comments) and Palpitations     Does not remember       Current Outpatient Medications:     allopurinoL (ZYLOPRIM) 100 MG tablet, Take 1 tablet (100 mg total) by mouth once daily. (Patient taking differently: Take 300 mg by mouth every other day.), Disp: , Rfl:     allopurinoL (ZYLOPRIM) 300 MG tablet, Take 1 tablet (300 mg total) by mouth every other day., Disp: 45 tablet,  Rfl: 2    amiodarone (PACERONE) 100 MG Tab, Take 1 tablet (100 mg total) by mouth once daily., Disp: 90 tablet, Rfl: 0    amLODIPine (NORVASC) 5 MG tablet, Take 2 tablets (10 mg total) by mouth nightly., Disp: 30 tablet, Rfl: 2    blood sugar diagnostic Strp, To check BG2 times daily,and PRN hypoglycemia  to use with insurance preferred meter Diagnosis Code: E11.65, Disp: 100 each, Rfl: 1    calcitRIOL (ROCALTROL) 0.25 MCG Cap, Take 0.25 mcg by mouth once daily., Disp: , Rfl:     carvediloL (COREG) 25 MG tablet, TAKE ONE TABLET BY MOUTH TWICE DAILY (Patient taking differently: Take 25 mg by mouth 2 (two) times daily with meals.), Disp: 180 tablet, Rfl: 2    ELIQUIS 5 mg Tab, TAKE ONE TABLET BY MOUTH TWICE DAILY, Disp: 60 tablet, Rfl: 2    isosorbide dinitrate (ISORDIL) 20 MG tablet, Take 1 tablet (20 mg total) by mouth 2 (two) times daily., Disp: 60 tablet, Rfl: 2    levothyroxine (SYNTHROID) 50 MCG tablet, Take 1 tablet (50 mcg total) by mouth every morning. ON EMPTY STOMACH, Disp: 90 tablet, Rfl: 2    magnesium oxide (MAG-OX) 400 mg (241.3 mg magnesium) tablet, Take 1 tablet (400 mg total) by mouth daily as needed (cramping)., Disp: 30 tablet, Rfl: 0    montelukast (SINGULAIR) 10 mg tablet, TAKE ONE TABLET BY MOUTH EVERY EVENING FOR allergies (Patient taking differently: Take 10 mg by mouth every evening.), Disp: 90 tablet, Rfl: 3    PRALUENT PEN 75 mg/mL PnIj, Inject 1 mL (75 mg total) into the skin every 14 (fourteen) days., Disp: 6 mL, Rfl: 3    albuterol (PROVENTIL/VENTOLIN HFA) 90 mcg/actuation inhaler, Inhale 1-2 puffs into the lungs every 6 (six) hours as needed for Wheezing or Shortness of Breath. Rescue, Disp: 6.7 g, Rfl: 0    metOLazone (ZAROXOLYN) 2.5 MG tablet, Take 1 tablet (2.5 mg total) by mouth once daily., Disp: 30 tablet, Rfl: 0    nitroGLYCERIN (NITROSTAT) 0.4 MG SL tablet, Place 1 tablet (0.4 mg total) under the tongue every 5 (five) minutes as needed for Chest pain., Disp: 25  "tablet, Rfl: 0    torsemide (DEMADEX) 20 MG Tab, Take 1 tablet (20 mg total) by mouth once daily., Disp: 30 tablet, Rfl: 0    Review of Systems   Constitutional: Negative for chills, diaphoresis, fever and malaise/fatigue.   HENT: Negative.    Eyes: Negative.    Cardiovascular: Positive for dyspnea on exertion (MILD) and leg swelling. Negative for chest pain, claudication, cyanosis, irregular heartbeat, near-syncope, orthopnea, palpitations, paroxysmal nocturnal dyspnea and syncope.   Respiratory: Negative for cough, hemoptysis, shortness of breath (LESS) and wheezing.    Endocrine: Negative.    Hematologic/Lymphatic: Negative for adenopathy. Bruises/bleeds easily.   Skin: Negative for color change and rash.   Musculoskeletal: Negative for back pain (CHRONIC) and falls.   Gastrointestinal: Negative for change in bowel habit, dysphagia, melena, nausea and vomiting.   Genitourinary: Negative for dysuria and flank pain.   Neurological: Negative for focal weakness, headaches, light-headedness, loss of balance, numbness and weakness.   Psychiatric/Behavioral: Negative for altered mental status and depression.   Allergic/Immunologic: Negative.         Objective:      Vitals:    08/19/22 1118 08/19/22 1127   BP: (!) 176/76 138/70   Pulse: (!) 52    Weight: 133.4 kg (294 lb 1.5 oz)    Height: 5' 7" (1.702 m)    PainSc: 0-No pain      Body mass index is 46.06 kg/m².    Physical Exam  Constitutional:       Appearance: She is morbidly obese.   HENT:      Head: Normocephalic and atraumatic.   Eyes:      General: No scleral icterus.     Extraocular Movements: Extraocular movements intact.   Neck:      Vascular: No carotid bruit.   Cardiovascular:      Rate and Rhythm: Normal rate and regular rhythm.      Pulses: Normal pulses.           Carotid pulses are 2+ on the right side and 2+ on the left side.       Radial pulses are 2+ on the right side and 2+ on the left side.      Heart sounds: Murmur heard.    Systolic murmur is " present with a grade of 1/6 at the lower left sternal border.    No friction rub. No gallop.   Pulmonary:      Effort: Pulmonary effort is normal.      Breath sounds: Normal breath sounds. No rales.   Abdominal:      Palpations: Abdomen is soft.      Tenderness: There is no abdominal tenderness.   Musculoskeletal:      Cervical back: Neck supple.      Right lower le+ Edema (+1) present.      Left lower leg: Edema (TRACE) present.      Comments: USES A CANE   Skin:     Capillary Refill: Capillary refill takes less than 2 seconds.   Neurological:      General: No focal deficit present.      Mental Status: She is alert and oriented to person, place, and time.      Cranial Nerves: Cranial nerves 2-12 are intact.   Psychiatric:         Mood and Affect: Mood normal.         Speech: Speech normal.         Behavior: Behavior normal.                 ..    Chemistry        Component Value Date/Time     07/15/2022 1038    K 4.6 07/15/2022 1038     07/15/2022 1038    CO2 31 (H) 07/15/2022 1038    BUN 57 (H) 07/15/2022 1038    CREATININE 3.2 (H) 07/15/2022 1038     (H) 07/15/2022 1038        Component Value Date/Time    CALCIUM 10.6 (H) 07/15/2022 1038    ALKPHOS 113 2022 1947    AST 20 2022 1947    ALT 29 2022 1947    BILITOT 0.5 2022    ESTGFRAFRICA 16.2 (A) 07/15/2022 1038    EGFRNONAA 14.0 (A) 07/15/2022 1038            ..  Lab Results   Component Value Date    CHOL 140 2021    CHOL 153 2021    CHOL 223 (H) 2020     Lab Results   Component Value Date    HDL 54 2021    HDL 45 2021    HDL 46 2020     Lab Results   Component Value Date    LDLCALC 62.6 (L) 2021    LDLCALC 82.0 2021    LDLCALC 108.6 2020     Lab Results   Component Value Date    TRIG 117 2021    TRIG 130 2021    TRIG 342 (H) 2020     Lab Results   Component Value Date    CHOLHDL 38.6 2021    CHOLHDL 29.4 2021    CHOLHDL 20.6  12/01/2020     ..  Lab Results   Component Value Date    WBC 7.10 07/15/2022    HGB 10.5 (L) 07/15/2022    HCT 35.2 (L) 07/15/2022    MCV 86 07/15/2022     07/15/2022       Test(s) Reviewed  I have reviewed the following in detail:  [] Stress test   [] Angiography   [] Echocardiogram   [x] Labs   [] Other:       Assessment:         ICD-10-CM ICD-9-CM   1. Chronic combined systolic and diastolic heart failure  I50.42 428.42   2. Coronary artery disease involving native coronary artery of native heart without angina pectoris  I25.10 414.01   3. Long term current use of amiodarone  Z79.899 V58.69   4. Obesity, Class III, BMI 40-49.9 (morbid obesity)  E66.01 278.01   5. CKD (chronic kidney disease) stage 4, GFR 15-29 ml/min  N18.4 585.4     Problem List Items Addressed This Visit        Cardiac/Vascular    Coronary artery disease    Long term current use of amiodarone    Relevant Orders    Comprehensive Metabolic Panel    TSH    Chronic combined systolic and diastolic heart failure - Primary    Relevant Orders    Comprehensive Metabolic Panel       Renal/    CKD (chronic kidney disease) stage 4, GFR 15-29 ml/min       Endocrine    Obesity, Class III, BMI 40-49.9 (morbid obesity)           Plan:     DAILY WEIGHT COMPLIANCE WITH WEIGHT INSTRUCTION AND MEDS, 2 LB PER DAY 5 LB PER WEEK RULE, CLASS 2-3 HEART FAILURE NO DEFINITE CLINICAL ARRHYTHMIA, INCREASED CV RISK WITH CKD, WATCH BP, PRN SL NTG WHICH MAY BE USED FOR SYSTOLIC BLOOD PRESSURE MORE THAN 160, PATIENT HAS INTOLERANCE TO MULTIPLE MEDICATIONS, STOP MULTIPLE MEDICINE ON THE PHONE, RETURN TO CLINIC IN, 4-5 M WITH LABS      Chronic combined systolic and diastolic heart failure  -     Comprehensive Metabolic Panel; Future; Expected date: 08/19/2022    Coronary artery disease involving native coronary artery of native heart without angina pectoris    Long term current use of amiodarone  -     Comprehensive Metabolic Panel; Future; Expected date: 08/19/2022  -      TSH; Future; Expected date: 08/19/2022    Obesity, Class III, BMI 40-49.9 (morbid obesity)    CKD (chronic kidney disease) stage 4, GFR 15-29 ml/min    Other orders  -     nitroGLYCERIN (NITROSTAT) 0.4 MG SL tablet; Place 1 tablet (0.4 mg total) under the tongue every 5 (five) minutes as needed for Chest pain.  Dispense: 25 tablet; Refill: 0    RTC Low level/low impact aerobic exercise 5x's/wk. Heart healthy diet and risk factor modification.    See labs and med orders.    Aerobic exercise 5x's/wk. Heart healthy diet and risk factor modification.    See labs and med orders.

## 2022-08-20 PROBLEM — N18.4 CKD (CHRONIC KIDNEY DISEASE) STAGE 4, GFR 15-29 ML/MIN: Status: ACTIVE | Noted: 2022-08-20

## 2022-08-24 ENCOUNTER — PATIENT MESSAGE (OUTPATIENT)
Dept: ADMINISTRATIVE | Facility: HOSPITAL | Age: 72
End: 2022-08-24
Payer: MEDICARE

## 2022-08-24 DIAGNOSIS — E11.9 TYPE 2 DIABETES MELLITUS WITHOUT COMPLICATION: ICD-10-CM

## 2022-08-31 DIAGNOSIS — Z78.0 MENOPAUSE: ICD-10-CM

## 2022-09-01 ENCOUNTER — PES CALL (OUTPATIENT)
Dept: ADMINISTRATIVE | Facility: CLINIC | Age: 72
End: 2022-09-01
Payer: MEDICARE

## 2022-09-30 NOTE — HOSPITAL COURSE
Patient admitted with acute on chronic systolic CHF/atypical chest pain /PEACE on CKD  Pt had localized chest pain which increases upon inspiration and was managed conservatively  She had Nephrectomy This Year  3 weeks ago she got admitted with same problem and still noncompliant with diuretics  Per Pt diuretics will make her dehydrated !  This time she was treated with iv diuretics with improvement in symptoms and PEACE   She had normal stress test which was done few months ago  Pt was counseled about the importance of medication compliance  and Follow up with Mds in OP setting  Later she was Discharged to home   never used

## 2022-10-03 ENCOUNTER — PATIENT OUTREACH (OUTPATIENT)
Dept: ADMINISTRATIVE | Facility: HOSPITAL | Age: 72
End: 2022-10-03
Payer: MEDICARE

## 2022-10-03 DIAGNOSIS — Z71.89 COMPLEX CARE COORDINATION: ICD-10-CM

## 2022-10-06 ENCOUNTER — PATIENT OUTREACH (OUTPATIENT)
Dept: ADMINISTRATIVE | Facility: HOSPITAL | Age: 72
End: 2022-10-06
Payer: MEDICARE

## 2022-10-06 ENCOUNTER — TELEPHONE (OUTPATIENT)
Dept: CARDIOLOGY | Facility: CLINIC | Age: 72
End: 2022-10-06
Payer: MEDICARE

## 2022-10-06 NOTE — TELEPHONE ENCOUNTER
----- Message from Liza Ferris sent at 10/6/2022 12:24 PM CDT -----  Contact: yo  Type:  Pharmacy Calling to Clarify an RX    Name of Caller:  Yo  Pharmacy Name:    [facility] - [address] [address] - [address]  [address]  [place] [address]  Phone: [phone] Fax: [phone]    Prescription Name:  isosorbide dinitrate (ISORDIL) 20 MG tablet  What do they need to clarify?:  patient has stopped taking this medication  Best Call Back Number:  [phone]  Additional Information:

## 2022-10-07 NOTE — TELEPHONE ENCOUNTER
Cieved call from pt pharmacy that pt was not taking isosorbide. Spoke to pt and she states she told you at her last visit she was not taking it

## 2022-10-10 ENCOUNTER — LAB VISIT (OUTPATIENT)
Dept: LAB | Facility: HOSPITAL | Age: 72
End: 2022-10-10
Attending: PHYSICIAN ASSISTANT
Payer: MEDICARE

## 2022-10-10 ENCOUNTER — PATIENT MESSAGE (OUTPATIENT)
Dept: ADMINISTRATIVE | Facility: HOSPITAL | Age: 72
End: 2022-10-10
Payer: MEDICARE

## 2022-10-10 DIAGNOSIS — Z79.899 LONG TERM CURRENT USE OF AMIODARONE: Chronic | ICD-10-CM

## 2022-10-10 DIAGNOSIS — E11.9 TYPE 2 DIABETES MELLITUS WITHOUT COMPLICATION: ICD-10-CM

## 2022-10-10 DIAGNOSIS — I50.42 CHRONIC COMBINED SYSTOLIC AND DIASTOLIC HEART FAILURE: Chronic | ICD-10-CM

## 2022-10-10 DIAGNOSIS — N18.4 CHRONIC KIDNEY DISEASE, STAGE IV (SEVERE): Primary | ICD-10-CM

## 2022-10-10 LAB
ALBUMIN SERPL BCP-MCNC: 3.4 G/DL (ref 3.5–5.2)
ALBUMIN/CREAT UR: 393.6 UG/MG (ref 0–30)
ANION GAP SERPL CALC-SCNC: 12 MMOL/L (ref 8–16)
BACTERIA #/AREA URNS AUTO: ABNORMAL /HPF
BASOPHILS # BLD AUTO: 0.03 K/UL (ref 0–0.2)
BASOPHILS NFR BLD: 0.4 % (ref 0–1.9)
BUN SERPL-MCNC: 65 MG/DL (ref 8–23)
CALCIUM SERPL-MCNC: 10.4 MG/DL (ref 8.7–10.5)
CHLORIDE SERPL-SCNC: 101 MMOL/L (ref 95–110)
CO2 SERPL-SCNC: 29 MMOL/L (ref 23–29)
CREAT SERPL-MCNC: 3.6 MG/DL (ref 0.5–1.4)
CREAT UR-MCNC: 94 MG/DL (ref 15–325)
DIFFERENTIAL METHOD: ABNORMAL
EOSINOPHIL # BLD AUTO: 0.3 K/UL (ref 0–0.5)
EOSINOPHIL NFR BLD: 3.5 % (ref 0–8)
ERYTHROCYTE [DISTWIDTH] IN BLOOD BY AUTOMATED COUNT: 14.5 % (ref 11.5–14.5)
EST. GFR  (NO RACE VARIABLE): 13 ML/MIN/1.73 M^2
GLUCOSE SERPL-MCNC: 236 MG/DL (ref 70–110)
HCT VFR BLD AUTO: 35.3 % (ref 37–48.5)
HGB BLD-MCNC: 10.5 G/DL (ref 12–16)
HYALINE CASTS UR QL AUTO: 5 /LPF
IMM GRANULOCYTES # BLD AUTO: 0.03 K/UL (ref 0–0.04)
IMM GRANULOCYTES NFR BLD AUTO: 0.4 % (ref 0–0.5)
LYMPHOCYTES # BLD AUTO: 2.3 K/UL (ref 1–4.8)
LYMPHOCYTES NFR BLD: 28.7 % (ref 18–48)
MCH RBC QN AUTO: 25.4 PG (ref 27–31)
MCHC RBC AUTO-ENTMCNC: 29.7 G/DL (ref 32–36)
MCV RBC AUTO: 86 FL (ref 82–98)
MICROALBUMIN UR DL<=1MG/L-MCNC: 370 UG/ML
MICROSCOPIC COMMENT: ABNORMAL
MONOCYTES # BLD AUTO: 0.6 K/UL (ref 0.3–1)
MONOCYTES NFR BLD: 6.9 % (ref 4–15)
NEUTROPHILS # BLD AUTO: 4.8 K/UL (ref 1.8–7.7)
NEUTROPHILS NFR BLD: 60.1 % (ref 38–73)
NRBC BLD-RTO: 0 /100 WBC
PHOSPHATE SERPL-MCNC: 3.8 MG/DL (ref 2.7–4.5)
PLATELET # BLD AUTO: 299 K/UL (ref 150–450)
PMV BLD AUTO: 10.8 FL (ref 9.2–12.9)
POTASSIUM SERPL-SCNC: 3.8 MMOL/L (ref 3.5–5.1)
RBC # BLD AUTO: 4.13 M/UL (ref 4–5.4)
RBC #/AREA URNS AUTO: 0 /HPF (ref 0–4)
SODIUM SERPL-SCNC: 142 MMOL/L (ref 136–145)
URATE SERPL-MCNC: 10 MG/DL (ref 2.4–5.7)
WBC # BLD AUTO: 7.95 K/UL (ref 3.9–12.7)
WBC #/AREA URNS AUTO: 1 /HPF (ref 0–5)

## 2022-10-10 PROCEDURE — 80061 LIPID PANEL: CPT | Performed by: INTERNAL MEDICINE

## 2022-10-10 PROCEDURE — 82570 ASSAY OF URINE CREATININE: CPT | Performed by: INTERNAL MEDICINE

## 2022-10-10 PROCEDURE — 80069 RENAL FUNCTION PANEL: CPT | Performed by: INTERNAL MEDICINE

## 2022-10-10 PROCEDURE — 80053 COMPREHEN METABOLIC PANEL: CPT | Performed by: INTERNAL MEDICINE

## 2022-10-10 PROCEDURE — 84443 ASSAY THYROID STIM HORMONE: CPT | Performed by: INTERNAL MEDICINE

## 2022-10-10 PROCEDURE — 82043 UR ALBUMIN QUANTITATIVE: CPT | Performed by: INTERNAL MEDICINE

## 2022-10-10 PROCEDURE — 83970 ASSAY OF PARATHORMONE: CPT | Performed by: INTERNAL MEDICINE

## 2022-10-10 PROCEDURE — 81001 URINALYSIS AUTO W/SCOPE: CPT | Performed by: INTERNAL MEDICINE

## 2022-10-10 PROCEDURE — 36415 COLL VENOUS BLD VENIPUNCTURE: CPT | Mod: PN | Performed by: INTERNAL MEDICINE

## 2022-10-10 PROCEDURE — 84439 ASSAY OF FREE THYROXINE: CPT | Performed by: INTERNAL MEDICINE

## 2022-10-10 PROCEDURE — 85025 COMPLETE CBC W/AUTO DIFF WBC: CPT | Performed by: INTERNAL MEDICINE

## 2022-10-10 PROCEDURE — 84550 ASSAY OF BLOOD/URIC ACID: CPT | Performed by: INTERNAL MEDICINE

## 2022-10-11 LAB
ALBUMIN SERPL BCP-MCNC: 3.4 G/DL (ref 3.5–5.2)
ALP SERPL-CCNC: 96 U/L (ref 55–135)
ALT SERPL W/O P-5'-P-CCNC: 14 U/L (ref 10–44)
ANION GAP SERPL CALC-SCNC: 14 MMOL/L (ref 8–16)
AST SERPL-CCNC: 15 U/L (ref 10–40)
BILIRUB SERPL-MCNC: 0.4 MG/DL (ref 0.1–1)
BUN SERPL-MCNC: 63 MG/DL (ref 8–23)
CALCIUM SERPL-MCNC: 10.2 MG/DL (ref 8.7–10.5)
CHLORIDE SERPL-SCNC: 100 MMOL/L (ref 95–110)
CHOLEST SERPL-MCNC: 135 MG/DL (ref 120–199)
CHOLEST/HDLC SERPL: 3.5 {RATIO} (ref 2–5)
CO2 SERPL-SCNC: 25 MMOL/L (ref 23–29)
CREAT SERPL-MCNC: 3.6 MG/DL (ref 0.5–1.4)
EST. GFR  (NO RACE VARIABLE): 13 ML/MIN/1.73 M^2
GLUCOSE SERPL-MCNC: 229 MG/DL (ref 70–110)
HDLC SERPL-MCNC: 39 MG/DL (ref 40–75)
HDLC SERPL: 28.9 % (ref 20–50)
LDLC SERPL CALC-MCNC: 70.8 MG/DL (ref 63–159)
NONHDLC SERPL-MCNC: 96 MG/DL
POTASSIUM SERPL-SCNC: 4 MMOL/L (ref 3.5–5.1)
PROT SERPL-MCNC: 6.9 G/DL (ref 6–8.4)
PTH-INTACT SERPL-MCNC: 356.7 PG/ML (ref 9–77)
SODIUM SERPL-SCNC: 139 MMOL/L (ref 136–145)
T4 FREE SERPL-MCNC: 1.19 NG/DL (ref 0.71–1.51)
TRIGL SERPL-MCNC: 126 MG/DL (ref 30–150)
TSH SERPL DL<=0.005 MIU/L-ACNC: 0.14 UIU/ML (ref 0.4–4)

## 2022-10-12 ENCOUNTER — TELEPHONE (OUTPATIENT)
Dept: FAMILY MEDICINE | Facility: CLINIC | Age: 72
End: 2022-10-12
Payer: MEDICARE

## 2022-10-12 NOTE — TELEPHONE ENCOUNTER
----- Message from Kinjal Castro sent at 10/12/2022  9:36 AM CDT -----  Contact: [phone] self  Pt said she look at her lab results and she thinks she need a medicine for her blood sugar. She has an appt on 11/9th and she said she needs to get in sooner

## 2022-10-14 ENCOUNTER — PATIENT MESSAGE (OUTPATIENT)
Dept: FAMILY MEDICINE | Facility: CLINIC | Age: 72
End: 2022-10-14
Payer: MEDICARE

## 2022-10-15 ENCOUNTER — PES CALL (OUTPATIENT)
Dept: ADMINISTRATIVE | Facility: CLINIC | Age: 72
End: 2022-10-15
Payer: MEDICARE

## 2022-10-19 DIAGNOSIS — E78.5 HYPERLIPIDEMIA, UNSPECIFIED HYPERLIPIDEMIA TYPE: ICD-10-CM

## 2022-10-20 RX ORDER — ALIROCUMAB 75 MG/ML
75 INJECTION, SOLUTION SUBCUTANEOUS
Qty: 6 ML | Refills: 3
Start: 2022-10-20 | End: 2022-11-03 | Stop reason: SDUPTHER

## 2022-10-26 ENCOUNTER — PATIENT OUTREACH (OUTPATIENT)
Dept: ADMINISTRATIVE | Facility: HOSPITAL | Age: 72
End: 2022-10-26
Payer: MEDICARE

## 2022-10-26 ENCOUNTER — PATIENT MESSAGE (OUTPATIENT)
Dept: ENDOCRINOLOGY | Facility: CLINIC | Age: 72
End: 2022-10-26
Payer: MEDICARE

## 2022-10-26 ENCOUNTER — PATIENT MESSAGE (OUTPATIENT)
Dept: ADMINISTRATIVE | Facility: HOSPITAL | Age: 72
End: 2022-10-26
Payer: MEDICARE

## 2022-10-26 NOTE — PROGRESS NOTES
10/26/2022 Care Everywhere updates requested and reviewed.  Immunizations reconciled. Media reports reviewed.  Duplicate HM overrides and  orders removed.  Overdue HM topic chart audit and/or requested.  Overdue lab testing linked to upcoming lab appointments if applies.  DIS reviewed       DEXA      Health Maintenance Due   Topic Date Due    COVID-19 Vaccine (1) Never done    Pneumococcal Vaccines (Age 65+) (1 - PCV) Never done    TETANUS VACCINE  Never done    Shingles Vaccine (1 of 2) Never done    High Dose Statin  Never done    Foot Exam  2021    Eye Exam  10/09/2021    DEXA Scan  2022    Influenza Vaccine (1) 2022

## 2022-10-27 ENCOUNTER — TELEPHONE (OUTPATIENT)
Dept: ENDOCRINOLOGY | Facility: CLINIC | Age: 72
End: 2022-10-27
Payer: MEDICARE

## 2022-10-27 NOTE — TELEPHONE ENCOUNTER
----- Message from Divina Mahoney, Patient Care Assistant sent at 10/27/2022  4:43 PM CDT -----  Regarding: returning call  Contact: pt  Type:  Patient Returning Call    Who Called:  pt   Who Left Message for Patient:  not sure   Does the patient know what this is regarding?:  yes   Best Call Back Number:  [phone] (home)     Additional Information:  please call pt to advise. Thanks!

## 2022-11-03 ENCOUNTER — OFFICE VISIT (OUTPATIENT)
Dept: ENDOCRINOLOGY | Facility: CLINIC | Age: 72
End: 2022-11-03
Payer: MEDICARE

## 2022-11-03 ENCOUNTER — LAB VISIT (OUTPATIENT)
Dept: LAB | Facility: HOSPITAL | Age: 72
End: 2022-11-03
Attending: PHYSICIAN ASSISTANT
Payer: MEDICARE

## 2022-11-03 VITALS
SYSTOLIC BLOOD PRESSURE: 130 MMHG | WEIGHT: 293 LBS | TEMPERATURE: 98 F | HEIGHT: 67 IN | DIASTOLIC BLOOD PRESSURE: 70 MMHG | BODY MASS INDEX: 45.99 KG/M2 | OXYGEN SATURATION: 96 % | HEART RATE: 54 BPM

## 2022-11-03 DIAGNOSIS — E11.65 TYPE 2 DIABETES MELLITUS WITH HYPERGLYCEMIA, WITHOUT LONG-TERM CURRENT USE OF INSULIN: ICD-10-CM

## 2022-11-03 DIAGNOSIS — E78.5 HYPERLIPIDEMIA, UNSPECIFIED HYPERLIPIDEMIA TYPE: ICD-10-CM

## 2022-11-03 DIAGNOSIS — E55.9 HYPOVITAMINOSIS D: ICD-10-CM

## 2022-11-03 DIAGNOSIS — E04.1 NODULAR THYROID DISEASE: Primary | ICD-10-CM

## 2022-11-03 DIAGNOSIS — E04.1 NODULAR THYROID DISEASE: ICD-10-CM

## 2022-11-03 LAB
25(OH)D3+25(OH)D2 SERPL-MCNC: 23 NG/ML (ref 30–96)
ALBUMIN SERPL BCP-MCNC: 3.3 G/DL (ref 3.5–5.2)
ANION GAP SERPL CALC-SCNC: 12 MMOL/L (ref 8–16)
BUN SERPL-MCNC: 55 MG/DL (ref 8–23)
CALCIUM SERPL-MCNC: 10.5 MG/DL (ref 8.7–10.5)
CHLORIDE SERPL-SCNC: 104 MMOL/L (ref 95–110)
CO2 SERPL-SCNC: 27 MMOL/L (ref 23–29)
CREAT SERPL-MCNC: 3.4 MG/DL (ref 0.5–1.4)
EST. GFR  (NO RACE VARIABLE): 14 ML/MIN/1.73 M^2
ESTIMATED AVG GLUCOSE: 192 MG/DL (ref 68–131)
GLUCOSE SERPL-MCNC: 255 MG/DL (ref 70–110)
HBA1C MFR BLD: 8.3 % (ref 4–5.6)
PHOSPHATE SERPL-MCNC: 3.4 MG/DL (ref 2.7–4.5)
POTASSIUM SERPL-SCNC: 4 MMOL/L (ref 3.5–5.1)
SODIUM SERPL-SCNC: 143 MMOL/L (ref 136–145)
T4 FREE SERPL-MCNC: 1.03 NG/DL (ref 0.71–1.51)
TSH SERPL DL<=0.005 MIU/L-ACNC: 0.3 UIU/ML (ref 0.4–4)

## 2022-11-03 PROCEDURE — 99215 OFFICE O/P EST HI 40 MIN: CPT | Mod: PBBFAC,PO | Performed by: PHYSICIAN ASSISTANT

## 2022-11-03 PROCEDURE — 99999 PR PBB SHADOW E&M-EST. PATIENT-LVL V: ICD-10-PCS | Mod: PBBFAC,,, | Performed by: PHYSICIAN ASSISTANT

## 2022-11-03 PROCEDURE — 99999 PR PBB SHADOW E&M-EST. PATIENT-LVL V: CPT | Mod: PBBFAC,,, | Performed by: PHYSICIAN ASSISTANT

## 2022-11-03 PROCEDURE — 82306 VITAMIN D 25 HYDROXY: CPT | Performed by: PHYSICIAN ASSISTANT

## 2022-11-03 PROCEDURE — 82985 ASSAY OF GLYCATED PROTEIN: CPT | Performed by: PHYSICIAN ASSISTANT

## 2022-11-03 PROCEDURE — 80069 RENAL FUNCTION PANEL: CPT | Performed by: PHYSICIAN ASSISTANT

## 2022-11-03 PROCEDURE — 99214 PR OFFICE/OUTPT VISIT, EST, LEVL IV, 30-39 MIN: ICD-10-PCS | Mod: S$PBB,,, | Performed by: PHYSICIAN ASSISTANT

## 2022-11-03 PROCEDURE — 83036 HEMOGLOBIN GLYCOSYLATED A1C: CPT | Performed by: PHYSICIAN ASSISTANT

## 2022-11-03 PROCEDURE — 99214 OFFICE O/P EST MOD 30 MIN: CPT | Mod: S$PBB,,, | Performed by: PHYSICIAN ASSISTANT

## 2022-11-03 PROCEDURE — 84443 ASSAY THYROID STIM HORMONE: CPT | Performed by: PHYSICIAN ASSISTANT

## 2022-11-03 PROCEDURE — 36415 COLL VENOUS BLD VENIPUNCTURE: CPT | Mod: PO | Performed by: PHYSICIAN ASSISTANT

## 2022-11-03 PROCEDURE — 84439 ASSAY OF FREE THYROXINE: CPT | Performed by: PHYSICIAN ASSISTANT

## 2022-11-03 RX ORDER — ALIROCUMAB 75 MG/ML
75 INJECTION, SOLUTION SUBCUTANEOUS
Qty: 6 ML | Refills: 3 | Status: SHIPPED | OUTPATIENT
Start: 2022-11-03 | End: 2023-01-10 | Stop reason: SDUPTHER

## 2022-11-03 RX ORDER — ALIROCUMAB 75 MG/ML
75 INJECTION, SOLUTION SUBCUTANEOUS
Qty: 6 ML | Refills: 3
Start: 2022-11-03 | End: 2022-11-03 | Stop reason: SDUPTHER

## 2022-11-03 NOTE — PROGRESS NOTES
"CC: DM/thyroid nodule/hypothyroidism    HPI: Juhi Taylor was diagnosed with Type 2 DM ~15 years ago. No hospitalizations for DM. Her daughter has DM. No fhx of thyroid disease.     Hyperparathyroidism.   No n/v.  Occ abdominal pain. No vomiting. Kidney stones in . She had a CT in  that was apparently normal.    CURRENT DIABETIC MEDS: none    Fastins  DN: 260s   Hypoglycemia: None  Type of Glucose Meter: one touch verio    Physical Activity: riding a stationary bike    Dietary Habits: 1-2 meal daily. Drinks water.     Last Eye Exam:  [facility]  Last Podiatry Exam:     Last DM Education Attended: Last year    Thyroid nodule  Dx 5-10 years ago  Bx in  of left thyroid nodule was benign  Last thyroid u/s  showed a dominant nodule 1.5 cm in the right lobe which could not be reached on previous attempt at FNA in  . Left lobe has a 2.4 cm nodule that has not changed since the prior exam.  No new voice changes, sob, dysphagia.     Hypothyroidism  Dx 10 years ago  LT4 50 mcg for six days of the week  +  hair loss (hereditary), brittle nails, occ palpitations, cold intolerance  No insomnia, constipation/diarrhea.     NICOLE-wears cpap    Social Hx: smoked 1 ppd for 40 years. She quit 2 years ago.  No ETOH use.     DEXA : Osteopenia. Taking ergo 3x weekly.    REVIEW OF SYSTEMS,  General: no weakness or fatigue, wt gain.   Eyes: no intermittent blurry vision or visual disturbances.   Cardiac: + leg swelling, no chest pain or palpitations.   Respiratory: no cough or dyspnea.   GI: no abdominal pain or nausea.   Skin: no rashes or itching.   Neuro: no numbness or tingling.   Musc: + back pain, leg pain  Endocrine: no polyuria, polydipsia, polyphagia.   Remainder ROS negative    Vital Signs  /70 (BP Location: Right arm, Patient Position: Sitting, BP Method: Large (Manual))   Pulse (!) 54   Temp 98.2 °F (36.8 °C) (Oral)   Ht 5' 7" (1.702 m)   Wt 133.7 kg (294 lb 13.8 oz)  "  SpO2 96%   BMI 46.18 kg/m²     Personally reviewed labs below with pt:     Hemoglobin A1C   Date Value Ref Range Status   07/15/2022 7.1 (H) 4.0 - 5.6 % Final     Comment:     ADA Screening Guidelines:  5.7-6.4%  Consistent with prediabetes  >or=6.5%  Consistent with diabetes    High levels of fetal hemoglobin interfere with the HbA1C  assay. Heterozygous hemoglobin variants (HbS, HgC, etc)do  not significantly interfere with this assay.   However, presence of multiple variants may affect accuracy.     02/16/2022 5.5 4.0 - 5.6 % Final     Comment:     ADA Screening Guidelines:  5.7-6.4%  Consistent with prediabetes  >or=6.5%  Consistent with diabetes    High levels of fetal hemoglobin interfere with the HbA1C  assay. Heterozygous hemoglobin variants (HbS, HgC, etc)do  not significantly interfere with this assay.   However, presence of multiple variants may affect accuracy.     11/19/2021 7.0 (H) 0.0 - 5.6 % Final     Comment:     Reference Interval:  5.0 - 5.6 Normal   5.7 - 6.4 High Risk   > 6.5 Diabetic      Hgb A1c results are standardized based on the (NGSP) National   Glycohemoglobin Standardization Program.      Hemoglobin A1C levels are related to mean serum/plasma glucose   during the preceding 2-3 months.            Chemistry        Component Value Date/Time     10/10/2022 1136     10/10/2022 1136    K 4.0 10/10/2022 1136    K 3.8 10/10/2022 1136     10/10/2022 1136     10/10/2022 1136    CO2 25 10/10/2022 1136    CO2 29 10/10/2022 1136    BUN 63 (H) 10/10/2022 1136    BUN 65 (H) 10/10/2022 1136    CREATININE 3.6 (H) 10/10/2022 1136    CREATININE 3.6 (H) 10/10/2022 1136     (H) 10/10/2022 1136     (H) 10/10/2022 1136        Component Value Date/Time    CALCIUM 10.2 10/10/2022 1136    CALCIUM 10.4 10/10/2022 1136    ALKPHOS 96 10/10/2022 1136    AST 15 10/10/2022 1136    ALT 14 10/10/2022 1136    BILITOT 0.4 10/10/2022 1136    ESTGFRAFRICA 16.2 (A) 07/15/2022 1038     EGFRNONAA 14.0 (A) 07/15/2022 1038        Lab Results   Component Value Date    CHOL 135 10/10/2022    CHOL 140 08/16/2021    CHOL 153 04/14/2021     Lab Results   Component Value Date    HDL 39 (L) 10/10/2022    HDL 54 08/16/2021    HDL 45 04/14/2021     Lab Results   Component Value Date    LDLCALC 70.8 10/10/2022    LDLCALC 62.6 (L) 08/16/2021    LDLCALC 82.0 04/14/2021     Lab Results   Component Value Date    TRIG 126 10/10/2022    TRIG 117 08/16/2021    TRIG 130 04/14/2021     Lab Results   Component Value Date    CHOLHDL 28.9 10/10/2022    CHOLHDL 38.6 08/16/2021    CHOLHDL 29.4 04/14/2021     Lab Results   Component Value Date    TSH 0.141 (L) 10/10/2022     2016 u/s      2018 below      Lab Results   Component Value Date    MICALBCREAT 393.6 (H) 10/10/2022     Vit D, 25-Hydroxy   Date Value Ref Range Status   08/16/2021 29 (L) 30 - 96 ng/mL Final     Comment:     Vitamin D deficiency.........<10 ng/mL                              Vitamin D insufficiency......10-29 ng/mL       Vitamin D sufficiency........> or equal to 30 ng/mL  Vitamin D toxicity............>100 ng/mL       PHYSICAL EXAMINATION  Constitutional: middle aged female, appears well, no distress  Neck: Supple, trachea midline.   Respiratory: even and unlabored, CTAB without wheezes.  Cardiovascular: RRR; no carotid bruits or murmurs.   Lymph: DP pulses  2+ bilaterally; 2+ edema bl  Skin: warm and dry; no acanthosis nigracans observed.  Neuro: patient alert and cooperative; CN 2-12 grossly intact  Abdomen: soft, nontender, non-distended. Obese abdomen.   Psych: normal mood and affect  11/22  Foot Exam: no sores or macerations noted.     Protective Sensation (w/ 10 gram monofilament):  Right: Intact  Left: Intact    Visual Inspection:  Normal -  Bilateral, Nails Intact - without Evidence of Foot Deformity- Bilateral and Dry Skin -  Bilateral    Pedal Pulses:   Right: Present  Left: Present    Posterior tibialis:   Right:Present  Left: Present      Vibratory Sensation  Right:Positive  Left:Positive     Assessment/Plan    1. Nodular thyroid disease  T4, Free    TSH      2. Type 2 diabetes mellitus with hyperglycemia, without long-term current use of insulin  T4, Free    TSH    Hemoglobin A1C    US Soft Tissue Head Neck Thyroid    Hemoglobin A1C    Renal Function Panel    Renal Function Panel      3. Hypovitaminosis D  Vitamin D      4. Hyperlipidemia, unspecified hyperlipidemia type  PRALUENT PEN 75 mg/mL PnIj         Thyroid nodule-nodules have been stable in size-repeat thyroid u/s. Pt declines surgery.  Hypothyroidism-stable-TSH is too low. Decrease LT4 to 50 mcg for five days of the week.   D3PX-Vahhlntnapyu is in the desired range. Start Mounjaro 2.5 mg weekly. Increase the dose to 5 mg weekly after one month.   UVF-cgcvlr-yxlyxmey Lisinopril 40 mg daily.   HLD-stable-statin intolerant. Continue Praulent 75 mg q 2 weeks. Recheck LP.  Hypovitaminosis D- low- Start 2000 IU of vd daily.  LPQ-cleesb-ostpihfa CPAP  Hyperparathryoidism-stable-monitor. Avoid calcium supplements. Increase intake of water. Calcium is normal.   Gout-elevated-continue allopurinol. Increase intake of water. Recheck next visit.   Microalbuminuria-stable-monitor      FOLLOW UP  Labs   Thyroid u/s  3 mths-a1c, rp

## 2022-11-04 ENCOUNTER — TELEPHONE (OUTPATIENT)
Dept: ENDOCRINOLOGY | Facility: CLINIC | Age: 72
End: 2022-11-04
Payer: MEDICARE

## 2022-11-05 ENCOUNTER — PATIENT MESSAGE (OUTPATIENT)
Dept: ENDOCRINOLOGY | Facility: CLINIC | Age: 72
End: 2022-11-05
Payer: MEDICARE

## 2022-11-05 DIAGNOSIS — E11.65 TYPE 2 DIABETES MELLITUS WITH HYPERGLYCEMIA, WITHOUT LONG-TERM CURRENT USE OF INSULIN: Primary | ICD-10-CM

## 2022-11-05 RX ORDER — INSULIN PUMP SYRINGE, 3 ML
EACH MISCELLANEOUS
Qty: 1 EACH | Refills: 0 | Status: SHIPPED | OUTPATIENT
Start: 2022-11-05 | End: 2022-11-09

## 2022-11-05 RX ORDER — LANCETS
EACH MISCELLANEOUS
Qty: 100 EACH | Refills: 3 | Status: SHIPPED | OUTPATIENT
Start: 2022-11-05

## 2022-11-08 LAB — FRUCTOSAMINE SERPL-SCNC: 454 UMOL /L

## 2022-11-09 ENCOUNTER — TELEPHONE (OUTPATIENT)
Dept: ORTHOPEDICS | Facility: CLINIC | Age: 72
End: 2022-11-09
Payer: MEDICARE

## 2022-11-09 ENCOUNTER — OFFICE VISIT (OUTPATIENT)
Dept: FAMILY MEDICINE | Facility: CLINIC | Age: 72
End: 2022-11-09
Payer: MEDICARE

## 2022-11-09 VITALS
DIASTOLIC BLOOD PRESSURE: 70 MMHG | BODY MASS INDEX: 45.54 KG/M2 | WEIGHT: 290.13 LBS | SYSTOLIC BLOOD PRESSURE: 128 MMHG | HEIGHT: 67 IN

## 2022-11-09 DIAGNOSIS — I48.0 PAROXYSMAL ATRIAL FIBRILLATION: ICD-10-CM

## 2022-11-09 DIAGNOSIS — T46.6X5A MYALGIA DUE TO STATIN: ICD-10-CM

## 2022-11-09 DIAGNOSIS — N25.81 HYPERPARATHYROIDISM, SECONDARY RENAL: ICD-10-CM

## 2022-11-09 DIAGNOSIS — M17.11 PRIMARY OSTEOARTHRITIS OF RIGHT KNEE: ICD-10-CM

## 2022-11-09 DIAGNOSIS — N18.4 HYPERTENSIVE HEART AND KIDNEY DISEASE WITH HF AND WITH CKD STAGE IV: ICD-10-CM

## 2022-11-09 DIAGNOSIS — E78.2 MIXED HYPERLIPIDEMIA: ICD-10-CM

## 2022-11-09 DIAGNOSIS — I25.10 CORONARY ARTERY DISEASE INVOLVING NATIVE CORONARY ARTERY OF NATIVE HEART WITHOUT ANGINA PECTORIS: ICD-10-CM

## 2022-11-09 DIAGNOSIS — M10.9 GOUT, UNSPECIFIED CAUSE, UNSPECIFIED CHRONICITY, UNSPECIFIED SITE: ICD-10-CM

## 2022-11-09 DIAGNOSIS — E11.65 TYPE 2 DIABETES MELLITUS WITH HYPERGLYCEMIA, WITHOUT LONG-TERM CURRENT USE OF INSULIN: ICD-10-CM

## 2022-11-09 DIAGNOSIS — E03.9 HYPOTHYROIDISM, UNSPECIFIED TYPE: ICD-10-CM

## 2022-11-09 DIAGNOSIS — Z01.89 ENCOUNTER FOR ROUTINE LABORATORY TESTING: ICD-10-CM

## 2022-11-09 DIAGNOSIS — Z00.00 MEDICARE ANNUAL WELLNESS VISIT, SUBSEQUENT: Primary | ICD-10-CM

## 2022-11-09 DIAGNOSIS — I13.0 HYPERTENSIVE HEART AND KIDNEY DISEASE WITH HF AND WITH CKD STAGE IV: ICD-10-CM

## 2022-11-09 DIAGNOSIS — M79.10 MYALGIA DUE TO STATIN: ICD-10-CM

## 2022-11-09 PROCEDURE — 99214 OFFICE O/P EST MOD 30 MIN: CPT | Mod: PBBFAC,PN | Performed by: INTERNAL MEDICINE

## 2022-11-09 PROCEDURE — 99214 OFFICE O/P EST MOD 30 MIN: CPT | Mod: S$PBB,25,, | Performed by: INTERNAL MEDICINE

## 2022-11-09 PROCEDURE — G0439 PPPS, SUBSEQ VISIT: HCPCS | Mod: ,,, | Performed by: INTERNAL MEDICINE

## 2022-11-09 PROCEDURE — G0439 PR MEDICARE ANNUAL WELLNESS SUBSEQUENT VISIT: ICD-10-PCS | Mod: ,,, | Performed by: INTERNAL MEDICINE

## 2022-11-09 PROCEDURE — 99214 PR OFFICE/OUTPT VISIT, EST, LEVL IV, 30-39 MIN: ICD-10-PCS | Mod: S$PBB,25,, | Performed by: INTERNAL MEDICINE

## 2022-11-09 PROCEDURE — 99999 PR PBB SHADOW E&M-EST. PATIENT-LVL IV: CPT | Mod: PBBFAC,,, | Performed by: INTERNAL MEDICINE

## 2022-11-09 PROCEDURE — 99999 PR PBB SHADOW E&M-EST. PATIENT-LVL IV: ICD-10-PCS | Mod: PBBFAC,,, | Performed by: INTERNAL MEDICINE

## 2022-11-09 RX ORDER — ALLOPURINOL 300 MG/1
150 TABLET ORAL DAILY
Qty: 45 TABLET | Refills: 2 | Status: SHIPPED | OUTPATIENT
Start: 2022-11-09 | End: 2023-01-10 | Stop reason: SDUPTHER

## 2022-11-09 RX ORDER — LEVOTHYROXINE SODIUM 50 UG/1
TABLET ORAL
Qty: 60 TABLET | Refills: 2 | Status: SHIPPED | OUTPATIENT
Start: 2022-11-09 | End: 2023-02-17

## 2022-11-09 RX ORDER — CALCITRIOL 0.5 UG/1
0.5 CAPSULE ORAL DAILY
COMMUNITY
Start: 2022-10-31 | End: 2023-02-10

## 2022-11-09 NOTE — PROGRESS NOTES
HRA: patient feels overall is healthy.  Psychosocial and behavioral risks discussed.  BMI - 45  Weight loss discussed.   Diet - well balanced.   ADL: self sufficient in all  Instrumental ADL: patient is able to manage things like their medications and finances.    Memory or cognitive function - Patient has no issues with either   Ambulates normal. No recent falls.  Exercise -  limitations from OA/DDD/neuropathy  Depression screening is negative.  Hearing--no deficits.  Vision - no deficits.   Incontinence - none  Opiate screen negative    Preventative health needs discussed and patient was given a printed list of what they have received and what they will need with in the next 5-10 years.  Screening schedule reviewed with patient      Advanced Care directive: recommend   I have reviewed and updated the patient's current list of providers.     In addition to the patient's preventative review and discussion today, the patient also has other issues to discuss today with a separate summary of plan below:     Subjective:   Note in      Patient ID: Juhi Taylor is a 72 y.o. female.    Gyn:  Hysterectomy partical & 1 ovary   MMG:  History of breast cancer.  3/2021 MRI breast done negative  Dexa:  2/2019 osteopenia  Colonoscopy:   yes 2014 or later Dr Payne    Immunizations: Flu:  Refuses Tdap:  Not sure Pneumovax:  Refused Prevnar 13:  Refused Shingles: Covid: defers recommend w medical hx   Smoker:  Former   Eye: [place] [facility]     Chief Complaint: Follow-up (Results from labs) and Medicare AWV    HPI  Here for f/u /lab review     Type 2 DM -testing 2 x daily sent in strips to [facility] [place] [facility].    Today change allopurinol 300 mg half pill daily, decrease levothyroxine to 5 pills weekly.        CKD stage 4; Gfr 14 worse since nephrectomy + microalbumin positive. // before sx stage 3 GFR 40's  mgmt [facility] Dr Martinez   Anemia chronic disease:  hb 10.9 iron Sat 12  -taking iron daily.    Secondary  hyperparathyroid renal:  Rx calcitriol recently increased to 0.5.  PTH 300s mgmt Renal     History of renal cell carcinoma status post nephrectomy    Type 2 DM:  Worsening off Jardiance due to muscle cramps/ just started Mounjaro 2.5 - tolerating.  // A1c up 8.1 / previous 5.5 off Metformin//see's João Adams PA-C  // (SE Jardince stopped cramps, Metformin ER stopped ckd, regular diarrhea) ///      Gout: uric acid 10 - Rx today change to 1/2 pill daily Allopurinol 300 mg every other day.      A Fib: dx 2021:  she took her self off Amiodarone, few months ago. // Stable Rx Eliquis, ASA 81, prev Amiodarone 100 -- mgmt w Cardio [facility]- stents w Dr Pedersen  //(SE: Bildil chest pains)     CAD: stable. No CP// hx 3 stents Dec 2020. Off Plavix //Dr Kern      CHF/pulmonary hypertension:  Pressure 51, EF 35-40%, DD grade 2.  + chronic shortness of breath reduced exercise tolerance. // Rx Entresto, Lasix.     HTN:  Controlled Rx Norvasc 5, carvedilol 25 // off Hydralazine.  (SE: Norvasc 10 leg swelling)   HLD:  Controlled; Goal <70// Rx Praluent // intolerant to statins myalgias  Hypothyroid: controlled Rx Levo 50 - taking 6 pills weekly. -- 11/9 change to 5 days weekly.    Vitamin-D def: missing doses Vit D2   Sleep Apnea: + CPAP/ using every day all night and w naps. Still working on diff masks that she likes.   Sleep testing: AHI 43 - auto bipap// 1/16/18: AHI 37.9 auto CPAP 8-20 mmHg .   Osteoarthritis multi joints: + leg pains, knees hips lower back. Diff walking. Sciatic right and radiation to hip _Gap helped a little - .  Reactive depression: off meds - doing ok right now   Prev tired the Cymbalta few days bad nausea stopped.       Review of Systems:  Review of Systems   Constitutional:  Negative for appetite change.   HENT:  Negative for nosebleeds.    Eyes:  Negative for pain.   Respiratory:  Negative for choking.    Gastrointestinal:  Negative for blood in stool.   Skin:  Negative for pallor.   Neurological:   "Negative for facial asymmetry.   Hematological:  Does not bruise/bleed easily.   Psychiatric/Behavioral:  Positive for dysphoric mood. Negative for confusion and sleep disturbance.      Objective:     Vitals:    11/09/22 1143   BP: 128/70   BP Location: Right arm   Patient Position: Sitting   Weight: 131.6 kg (290 lb 2 oz)   Height: 5' 7" (1.702 m)          Physical Exam  Vitals reviewed.   Constitutional:       Appearance: Normal appearance. She is obese.   HENT:      Head: Normocephalic and atraumatic.      Mouth/Throat:      Pharynx: Oropharynx is clear.   Eyes:      Extraocular Movements: Extraocular movements intact.      Conjunctiva/sclera: Conjunctivae normal.      Pupils: Pupils are equal, round, and reactive to light.   Cardiovascular:      Rate and Rhythm: Normal rate and regular rhythm.      Heart sounds: Murmur heard.   Pulmonary:      Effort: Pulmonary effort is normal.      Breath sounds: Normal breath sounds.   Abdominal:      General: Bowel sounds are normal.      Palpations: Abdomen is soft.   Musculoskeletal:      Right lower leg: No edema.      Left lower leg: No edema.   Neurological:      Mental Status: She is alert and oriented to person, place, and time.   Psychiatric:         Mood and Affect: Mood normal.       Medication List with Changes/Refills   New Medications    BLOOD SUGAR DIAGNOSTIC (TRUE METRIX GLUCOSE TEST STRIP) STRP    1 strip by Misc.(Non-Drug; Combo Route) route 2 (two) times daily as needed (Type 2 DM).   Current Medications    AMLODIPINE (NORVASC) 5 MG TABLET    Take 2 tablets (10 mg total) by mouth nightly.    CALCITRIOL (ROCALTROL) 0.5 MCG CAP    Take 0.5 mcg by mouth once daily.    CARVEDILOL (COREG) 25 MG TABLET    TAKE ONE TABLET BY MOUTH TWICE DAILY    ELIQUIS 5 MG TAB    TAKE ONE TABLET BY MOUTH TWICE DAILY    [place] [facility] [place]    To check BG 1 times daily, to use with insurance preferred meter    MAGNESIUM OXIDE (MAG-OX) 400 MG (241.3 MG MAGNESIUM) TABLET    Take 1 tablet " (400 mg total) by mouth daily as needed (cramping).    MONTELUKAST (SINGULAIR) 10 MG TABLET    TAKE ONE TABLET BY MOUTH EVERY EVENING FOR allergies    NITROGLYCERIN (NITROSTAT) 0.4 MG SL TABLET    Place 1 tablet (0.4 mg total) under the tongue every 5 (five) minutes as needed for Chest pain.    PRALUENT PEN 75 MG/ML PNIJ    Inject 1 mL (75 mg total) into the skin every 14 (fourteen) days.    TIRZEPATIDE 2.5 MG/0.5 ML PNIJ    Inject 2.5 mg into the skin every 7 days.   Changed and/or Refilled Medications    Modified Medication Previous Medication    ALLOPURINOL (ZYLOPRIM) 300 MG TABLET allopurinoL (ZYLOPRIM) 300 MG tablet       Take 0.5 tablets (150 mg total) by mouth once daily.    Take 1 tablet (300 mg total) by mouth every other day.    LEVOTHYROXINE (SYNTHROID) 50 MCG TABLET levothyroxine (SYNTHROID) 50 MCG tablet       1 tab po 5 x weekly    Take 1 tablet (50 mcg total) by mouth every morning. ON EMPTY STOMACH   Discontinued Medications    ALBUTEROL (PROVENTIL/VENTOLIN HFA) 90 MCG/ACTUATION INHALER    Inhale 1-2 puffs into the lungs every 6 (six) hours as needed for Wheezing or Shortness of Breath. Rescue    ALLOPURINOL (ZYLOPRIM) 100 MG TABLET    Take 1 tablet (100 mg total) by mouth once daily.    AMIODARONE (PACERONE) 100 MG TAB    Take 1 tablet (100 mg total) by mouth once daily.    BLOOD SUGAR DIAGNOSTIC STRP    To check BG 1 times daily, to use with insurance preferred meter    BLOOD-GLUCOSE METER KIT    To check BG 1 times daily, to use with insurance preferred meter    CALCITRIOL (ROCALTROL) 0.25 MCG CAP    Take 0.25 mcg by mouth once daily.    METOLAZONE (ZAROXOLYN) 2.5 MG TABLET    Take 1 tablet (2.5 mg total) by mouth once daily.    TORSEMIDE (DEMADEX) 20 MG TAB    Take 1 tablet (20 mg total) by mouth once daily.       Assessment & Plan:     Medicare annual wellness visit, subsequent    Type 2 diabetes mellitus with hyperglycemia, without long-term current use of insulin  -     blood sugar diagnostic  (TRUE METRIX GLUCOSE TEST STRIP) Strp; 1 strip by Misc.(Non-Drug; Combo Route) route 2 (two) times daily as needed (Type 2 DM).  Dispense: 200 strip; Refill: 12    Hypertensive heart and kidney disease with HF and with CKD stage IV    Primary osteoarthritis of right knee  Comments:  Refer to ortho  Orders:  -     Ambulatory referral/consult to Orthopedics; Future; Expected date: 11/16/2022    Hyperparathyroidism, secondary renal    Hypothyroidism, unspecified type  Comments:  cut down to 5 pills weekly   Orders:  -     TSH; Future; Expected date: 11/09/2022  -     levothyroxine (SYNTHROID) 50 MCG tablet; 1 tab po 5 x weekly  Dispense: 60 tablet; Refill: 2    Coronary artery disease involving native coronary artery of native heart without angina pectoris  -     Lipid Panel; Future; Expected date: 11/09/2022    Paroxysmal atrial fibrillation    Gout, unspecified cause, unspecified chronicity, unspecified site  -     allopurinoL (ZYLOPRIM) 300 MG tablet; Take 0.5 tablets (150 mg total) by mouth once daily.  Dispense: 45 tablet; Refill: 2    Mixed hyperlipidemia  -     Lipid Panel; Future; Expected date: 11/09/2022    Encounter for routine laboratory testing  -     TSH; Future; Expected date: 11/09/2022  -     Lipid Panel; Future; Expected date: 11/09/2022    Myalgia due to statin        Continue to work on regular exercise, maintain healthy weight, balanced diet. Avoid unhealthy habits: smoking, excessive alcohol intake.

## 2022-11-11 PROBLEM — N25.81 HYPERPARATHYROIDISM, SECONDARY RENAL: Status: ACTIVE | Noted: 2022-11-11

## 2022-11-14 ENCOUNTER — PATIENT MESSAGE (OUTPATIENT)
Dept: ENDOCRINOLOGY | Facility: CLINIC | Age: 72
End: 2022-11-14
Payer: MEDICARE

## 2022-11-21 ENCOUNTER — HOSPITAL ENCOUNTER (OUTPATIENT)
Dept: RADIOLOGY | Facility: HOSPITAL | Age: 72
Discharge: HOME | End: 2022-11-21
Attending: PHYSICIAN ASSISTANT
Payer: MEDICARE

## 2022-11-21 ENCOUNTER — PATIENT OUTREACH (OUTPATIENT)
Dept: ADMINISTRATIVE | Facility: HOSPITAL | Age: 72
End: 2022-11-21
Payer: MEDICARE

## 2022-11-21 ENCOUNTER — HOSPITAL ENCOUNTER (OUTPATIENT)
Dept: RADIOLOGY | Facility: CLINIC | Age: 72
Discharge: HOME | End: 2022-11-21
Attending: INTERNAL MEDICINE
Payer: MEDICARE

## 2022-11-21 DIAGNOSIS — E11.65 TYPE 2 DIABETES MELLITUS WITH HYPERGLYCEMIA, WITHOUT LONG-TERM CURRENT USE OF INSULIN: ICD-10-CM

## 2022-11-21 DIAGNOSIS — Z78.0 MENOPAUSE: ICD-10-CM

## 2022-11-21 PROCEDURE — 77080 DXA BONE DENSITY AXIAL: CPT | Mod: TC,PO

## 2022-11-21 PROCEDURE — 77080 DXA BONE DENSITY AXIAL: CPT | Mod: 26,,, | Performed by: RADIOLOGY

## 2022-11-21 PROCEDURE — 77080 DEXA BONE DENSITY SPINE HIP: ICD-10-PCS | Mod: 26,,, | Performed by: RADIOLOGY

## 2022-11-21 PROCEDURE — 76536 US EXAM OF HEAD AND NECK: CPT | Mod: TC

## 2022-11-21 PROCEDURE — 76536 US EXAM OF HEAD AND NECK: CPT | Mod: 26,,, | Performed by: RADIOLOGY

## 2022-11-21 PROCEDURE — 76536 US SOFT TISSUE HEAD NECK THYROID: ICD-10-PCS | Mod: 26,,, | Performed by: RADIOLOGY

## 2022-11-21 NOTE — PROGRESS NOTES
Non-compliant report chart audits. Chart review completed for HM test overdue (Mammogram, Colon Cancer Screening, Pap smear, DM labs, BP Check and/or Eye Exam)      Care Everywhere and media, updates requested and reviewed.        Contacted pt about scheduling eye exam, pt stated she talked about this w/ Dr. Blanco already and hung up on me.

## 2022-11-23 DIAGNOSIS — M25.569 KNEE PAIN, UNSPECIFIED CHRONICITY, UNSPECIFIED LATERALITY: Primary | ICD-10-CM

## 2022-11-25 ENCOUNTER — HOSPITAL ENCOUNTER (OUTPATIENT)
Dept: RADIOLOGY | Facility: HOSPITAL | Age: 72
Discharge: HOME | End: 2022-11-25
Attending: ORTHOPAEDIC SURGERY
Payer: MEDICARE

## 2022-11-25 DIAGNOSIS — M85.80 OSTEOPENIA WITH HIGH RISK OF FRACTURE: Primary | ICD-10-CM

## 2022-11-25 DIAGNOSIS — M25.569 KNEE PAIN, UNSPECIFIED CHRONICITY, UNSPECIFIED LATERALITY: ICD-10-CM

## 2022-11-25 PROCEDURE — 73564 X-RAY EXAM KNEE 4 OR MORE: CPT | Mod: 26,RT,, | Performed by: RADIOLOGY

## 2022-11-25 PROCEDURE — 73564 X-RAY EXAM KNEE 4 OR MORE: CPT | Mod: TC,FY,RT

## 2022-11-25 PROCEDURE — 73562 XR KNEE ORTHO RIGHT WITH FLEXION: ICD-10-PCS | Mod: 26,LT,, | Performed by: RADIOLOGY

## 2022-11-25 PROCEDURE — 73562 X-RAY EXAM OF KNEE 3: CPT | Mod: 26,LT,, | Performed by: RADIOLOGY

## 2022-11-25 PROCEDURE — 73564 XR KNEE ORTHO RIGHT WITH FLEXION: ICD-10-PCS | Mod: 26,RT,, | Performed by: RADIOLOGY

## 2022-11-25 RX ORDER — CALCITONIN SALMON 200 [IU]/.09ML
1 SPRAY, METERED NASAL DAILY
Qty: 3.7 ML | Refills: 6 | Status: SHIPPED | OUTPATIENT
Start: 2022-11-25 | End: 2023-02-10

## 2022-11-28 ENCOUNTER — OFFICE VISIT (OUTPATIENT)
Dept: ORTHOPEDICS | Facility: CLINIC | Age: 72
End: 2022-11-28
Payer: MEDICARE

## 2022-11-28 VITALS — WEIGHT: 290 LBS | BODY MASS INDEX: 45.52 KG/M2 | RESPIRATION RATE: 18 BRPM | HEIGHT: 67 IN

## 2022-11-28 DIAGNOSIS — M17.11 PRIMARY OSTEOARTHRITIS OF RIGHT KNEE: ICD-10-CM

## 2022-11-28 DIAGNOSIS — E11.65 TYPE 2 DIABETES MELLITUS WITH HYPERGLYCEMIA, WITHOUT LONG-TERM CURRENT USE OF INSULIN: Primary | ICD-10-CM

## 2022-11-28 DIAGNOSIS — M85.80 OSTEOPENIA WITH HIGH RISK OF FRACTURE: ICD-10-CM

## 2022-11-28 PROCEDURE — 99203 PR OFFICE/OUTPT VISIT, NEW, LEVL III, 30-44 MIN: ICD-10-PCS | Mod: 25,S$PBB,, | Performed by: ORTHOPAEDIC SURGERY

## 2022-11-28 PROCEDURE — 99999 PR PBB SHADOW E&M-EST. PATIENT-LVL IV: ICD-10-PCS | Mod: PBBFAC,,, | Performed by: ORTHOPAEDIC SURGERY

## 2022-11-28 PROCEDURE — 20610 DRAIN/INJ JOINT/BURSA W/O US: CPT | Mod: PBBFAC,PN,RT | Performed by: ORTHOPAEDIC SURGERY

## 2022-11-28 PROCEDURE — 20610 LARGE JOINT ASPIRATION/INJECTION: R KNEE: ICD-10-PCS | Mod: S$PBB,RT,, | Performed by: ORTHOPAEDIC SURGERY

## 2022-11-28 PROCEDURE — 99999 PR PBB SHADOW E&M-EST. PATIENT-LVL IV: CPT | Mod: PBBFAC,,, | Performed by: ORTHOPAEDIC SURGERY

## 2022-11-28 PROCEDURE — 99203 OFFICE O/P NEW LOW 30 MIN: CPT | Mod: 25,S$PBB,, | Performed by: ORTHOPAEDIC SURGERY

## 2022-11-28 PROCEDURE — 99214 OFFICE O/P EST MOD 30 MIN: CPT | Mod: PBBFAC,PN,25 | Performed by: ORTHOPAEDIC SURGERY

## 2022-11-28 RX ORDER — TRIAMCINOLONE ACETONIDE 40 MG/ML
40 INJECTION, SUSPENSION INTRA-ARTICULAR; INTRAMUSCULAR
Status: DISCONTINUED | OUTPATIENT
Start: 2022-11-28 | End: 2022-11-28 | Stop reason: HOSPADM

## 2022-11-28 RX ORDER — CALCITONIN SALMON 200 [IU]/.09ML
1 SPRAY, METERED NASAL DAILY
Qty: 3.7 ML | Refills: 6 | Status: CANCELLED | OUTPATIENT
Start: 2022-11-28 | End: 2023-11-28

## 2022-11-28 RX ADMIN — TRIAMCINOLONE ACETONIDE 40 MG: 40 INJECTION, SUSPENSION INTRA-ARTICULAR; INTRAMUSCULAR at 10:11

## 2022-11-28 NOTE — PROCEDURES
Large Joint Aspiration/Injection: R knee    Date/Time: 11/28/2022 10:30 AM  Performed by: Damian Lowe MD  Authorized by: Damian Lowe MD     Consent Done?:  Yes (Verbal)  Indications:  Pain  Site marked: the procedure site was marked    Timeout: prior to procedure the correct patient, procedure, and site was verified    Local anesthetic:  Lidocaine 1% without epinephrine and bupivacaine 0.25% without epinephrine  Anesthetic total (ml):  6      Details:  Needle Size:  20 G  Approach:  Anterolateral  Location:  Knee  Site:  R knee  Medications:  40 mg triamcinolone acetonide 40 mg/mL  Patient tolerance:  Patient tolerated the procedure well with no immediate complications

## 2022-11-28 NOTE — PROGRESS NOTES
"Past Medical History:   Diagnosis Date    Anticoagulant long-term use     Arthritis     Breast cancer 2014    invasive lobular carcinoma    Cancer of kidney 11/2020    RIGHT KIDNEY CANCER    CHF (congestive heart failure)     Coronary artery disease dx 2005    Depression     Diabetes mellitus     Diastolic heart failure secondary to hypertension     Gout     Hyperlipemia     Hypertension     Hypertrophy of nasal turbinates     Kidney mass 2020    Right    Levoscoliosis     Lung nodule     left    Multiple thyroid nodules     NICOLE (obstructive sleep apnea)     uses C-PAP    Pulmonary hypertension        Past Surgical History:   Procedure Laterality Date    AORTOGRAPHY N/A 12/04/2020    Procedure: Aortogram;  Surgeon: Paul Pedersen MD;  Location: [facility] CATH;  Service: Cardiology;  Laterality: N/A;    BREAST SURGERY      CARDIAC CATHETERIZATION  12/2020    CHOLECYSTECTOMY      COLONOSCOPY      multi -last 2014     CORONARY ARTERY BYPASS GRAFT      ESOPHAGOGASTRODUODENOSCOPY      2012     FRACTURE SURGERY      HAND SURGERY Right     HYSTERECTOMY      LAPAROSCOPIC ROBOT-ASSISTED SURGICAL REMOVAL OF KIDNEY USING DA CHELLE XI Right 03/10/2022    Procedure: XI ROBOTIC NEPHRECTOMY- radical;  Surgeon: Rolando Ramirez MD;  Location: [facility] OR;  Service: Urology;  Laterality: Right;    MASTECTOMY W/ SENTINEL NODE BIOPSY Bilateral 01/21/2015    bilateral "dog ears"    NASAL SINUS SURGERY  2015    Dr Bryant FESS/cauterization turbinate     PARTIAL HYSTERECTOMY  1989    PERCUTANEOUS TRANSLUMINAL BALLOON ANGIOPLASTY OF CORONARY ARTERY  12/04/2020    Procedure: Angioplasty-coronary;  Surgeon: Paul Pedersen MD;  Location: [facility] CATH;  Service: Cardiology;;    RENAL BIOPSY Right     9/20/2021 EJ    TUBAL LIGATION      ULTRASOUND GUIDANCE  12/04/2020    Procedure: Ultrasound Guidance;  Surgeon: Paul Pedersen MD;  Location: [facility] CATH;  Service: Cardiology;;       Current Outpatient Medications   Medication Sig    allopurinoL " (ZYLOPRIM) 300 MG tablet Take 0.5 tablets (150 mg total) by mouth once daily.    amLODIPine (NORVASC) 5 MG tablet Take 2 tablets (10 mg total) by mouth nightly.    blood sugar diagnostic (TRUE METRIX GLUCOSE TEST STRIP) Strp 1 strip by Misc.(Non-Drug; Combo Route) route 2 (two) times daily as needed (Type 2 DM).    calcitonin, salmon, (FORTICAL) 200 unit/actuation nasal spray 1 spray by Nasal route once daily. Alternate nostrils    calcitRIOL (ROCALTROL) 0.5 MCG Cap Take 0.5 mcg by mouth once daily.    carvediloL (COREG) 25 MG tablet TAKE ONE TABLET BY MOUTH TWICE DAILY (Patient taking differently: Take 25 mg by mouth 2 (two) times daily with meals.)    ELIQUIS 5 mg Tab TAKE ONE TABLET BY MOUTH TWICE DAILY    lancets [facility] To check BG 1 times daily, to use with insurance preferred meter    levothyroxine (SYNTHROID) 50 MCG tablet 1 tab po 5 x weekly    magnesium oxide (MAG-OX) 400 mg (241.3 mg magnesium) tablet Take 1 tablet (400 mg total) by mouth daily as needed (cramping).    montelukast (SINGULAIR) 10 mg tablet TAKE ONE TABLET BY MOUTH EVERY EVENING FOR allergies    nitroGLYCERIN (NITROSTAT) 0.4 MG SL tablet Place 1 tablet (0.4 mg total) under the tongue every 5 (five) minutes as needed for Chest pain.    PRALUENT PEN 75 mg/mL PnIj Inject 1 mL (75 mg total) into the skin every 14 (fourteen) days.    tirzepatide 2.5 mg/0.5 mL PnIj Inject 2.5 mg into the skin every 7 days.     No current facility-administered medications for this visit.       Review of patient's allergies indicates:   Allergen Reactions    Percocet [oxycodone-acetaminophen] Itching    Irbesartan Swelling    Januvia [sitagliptin]     Jardiance [empagliflozin]      Leg cramps    Lipitor [atorvastatin] Other (See Comments)     Severe leg pain    Linaclotide Other (See Comments) and Nausea And Vomiting     Does not remember    Lubiprostone Other (See Comments) and Palpitations     Does not remember       Family History   Problem Relation Age of Onset     Breast cancer Mother     Stroke Father     Hypertension Father     Hepatitis Brother     Asthma Daughter     Birth defects Daughter         Nell's two children has cleft lips    Depression Daughter     Drug abuse Daughter     Learning disabilities Daughter     Mental illness Daughter     Breast cancer Maternal Aunt     Glaucoma Sister     Drug abuse Daughter     Macular degeneration Neg Hx     Retinal detachment Neg Hx        Social History     Socioeconomic History    Marital status: Single    Number of children: 4   Occupational History    Occupation: retired Psych aid    Tobacco Use    Smoking status: Former     Packs/day: 1.00     Years: 0.00     Pack years: 0.00     Types: Cigarettes     Quit date: 2016     Years since quittin.9    Smokeless tobacco: Never    Tobacco comments:     quit    Substance and Sexual Activity    Alcohol use: No    Drug use: No    Sexual activity: Not Currently     Partners: Male     Birth control/protection: None       Chief Complaint:   Chief Complaint   Patient presents with    Knee Pain     eval right knee pain        History of present illness:  72-year-old female seen for right knee pain.  Patient has had pain for years.  Getting worse over the last few months.  She is had an injection previously but was about 6 or 7 years ago.  Knee is starting to become more knock-kneed.  Pain is 7/10.  No new injury or no recent treatment.    Review of Systems:    Constitution: Negative for chills, fever, and sweats.  Negative for unexplained weight loss.    HENT:  Negative for headaches and blurry vision.    Cardiovascular:Negative for chest pain or irregular heart beat. Negative for hypertension.    Respiratory:  Negative for cough and shortness of breath.    Gastrointestinal: Negative for abdominal pain, heartburn, melena, nausea, and vomitting.    Genitourinary:  Negative bladder incontinence and dysuria.    Musculoskeletal:  See HPI    Neurological: Negative for  numbness.    Psychiatric/Behavioral: Negative for depression.  The patient is not nervous/anxious.      Endocrine: Negative for polyuria    Hematologic/Lymphatic: Negative for bleeding problem.  Does not bruise/bleed easily.    Skin: Negative for poor would healing and rash      Physical Examination:    Vital Signs:    Vitals:    11/28/22 1019   Resp: 18       Body mass index is 45.42 kg/m².    This a well-developed, well nourished patient in no acute distress.  They are alert and oriented and cooperative to examination.  Pt. walks without an antalgic gait.      Examination of the right knee shows no rashes or erythema. There are no masses ecchymosis or effusion. Patient has full range of motion from 0-130°. Patient is moderately tender to palpation over lateral joint line and nontender to palpation over the medial joint line. Patient has a - Lachman exam, - anterior drawer exam, and - posterior drawer exam. - Julia's exam. Knee is stable to varus and valgus stress. 5 out of 5 motor strength. Palpable distal pulses. Intact light touch sensation. Negative Patellofemoral crepitus.  Obvious valgus alignment.      X-rays:  X-rays of the right knee are reviewed which show severe lateral compartment narrowing     Assessment::  Right knee osteoarthritis    Plan:  I reviewed the x-ray with her today.  Talked about treatment options for osteoarthritis.  Talked about knee replacement briefly.  Patient elected to try a cortisone injection help her through the holidays.    This note was created using [employer] voice recognition software that occasionally misinterpreted phrases or words.    Consult note is delivered via Epic messaging service.

## 2022-11-29 ENCOUNTER — PATIENT MESSAGE (OUTPATIENT)
Dept: ENDOCRINOLOGY | Facility: CLINIC | Age: 72
End: 2022-11-29
Payer: MEDICARE

## 2022-11-29 RX ORDER — TIRZEPATIDE 5 MG/.5ML
5 INJECTION, SOLUTION SUBCUTANEOUS
Qty: 4 PEN | Refills: 11 | Status: SHIPPED | OUTPATIENT
Start: 2022-11-29 | End: 2023-03-10

## 2022-11-30 RX ORDER — AMLODIPINE BESYLATE 10 MG/1
10 TABLET ORAL DAILY
Qty: 90 TABLET | Refills: 1 | OUTPATIENT
Start: 2022-11-30

## 2022-11-30 NOTE — TELEPHONE ENCOUNTER
No new care gaps identified.  [facility] Embedded Care Gaps. Reference number: [id number]. 11/30/2022   8:43:37 AM CST

## 2022-11-30 NOTE — TELEPHONE ENCOUNTER
Refill Decision Note   Juhi Taylor  is requesting a refill authorization.  Brief Assessment and Rationale for Refill:  Quick Discontinue    -Medication-Related Problems Identified: Dose adjustment  Medication Therapy Plan:  PATIENT WAS DECREASED TO 5MG ON 03/20/22    Medication Reconciliation Completed: No   Comments:     No Care Gaps recommended.     Note composed:1:40 PM 11/30/2022

## 2022-12-05 ENCOUNTER — PATIENT MESSAGE (OUTPATIENT)
Dept: FAMILY MEDICINE | Facility: CLINIC | Age: 72
End: 2022-12-05
Payer: MEDICARE

## 2022-12-08 ENCOUNTER — OFFICE VISIT (OUTPATIENT)
Dept: PODIATRY | Facility: CLINIC | Age: 72
End: 2022-12-08
Payer: MEDICARE

## 2022-12-08 VITALS — RESPIRATION RATE: 18 BRPM | HEIGHT: 67 IN | OXYGEN SATURATION: 98 % | WEIGHT: 290 LBS | BODY MASS INDEX: 45.52 KG/M2

## 2022-12-08 DIAGNOSIS — I73.9 PERIPHERAL VASCULAR DISEASE, UNSPECIFIED: ICD-10-CM

## 2022-12-08 DIAGNOSIS — E11.65 TYPE 2 DIABETES MELLITUS WITH HYPERGLYCEMIA, WITHOUT LONG-TERM CURRENT USE OF INSULIN: ICD-10-CM

## 2022-12-08 DIAGNOSIS — M21.42 PES PLANUS OF BOTH FEET: ICD-10-CM

## 2022-12-08 DIAGNOSIS — E11.49 DIABETIC NEUROPATHY WITH NEUROLOGIC COMPLICATION: ICD-10-CM

## 2022-12-08 DIAGNOSIS — M21.41 PES PLANUS OF BOTH FEET: ICD-10-CM

## 2022-12-08 DIAGNOSIS — E11.40 DIABETIC NEUROPATHY WITH NEUROLOGIC COMPLICATION: ICD-10-CM

## 2022-12-08 DIAGNOSIS — E11.9 ENCOUNTER FOR DIABETIC FOOT EXAM: Primary | ICD-10-CM

## 2022-12-08 PROCEDURE — 99203 OFFICE O/P NEW LOW 30 MIN: CPT | Mod: S$GLB,,, | Performed by: PODIATRIST

## 2022-12-08 PROCEDURE — 99203 PR OFFICE/OUTPT VISIT, NEW, LEVL III, 30-44 MIN: ICD-10-PCS | Mod: S$GLB,,, | Performed by: PODIATRIST

## 2022-12-08 RX ORDER — FUROSEMIDE 40 MG/1
40 TABLET ORAL DAILY
Status: ON HOLD | COMMUNITY
Start: 2022-07-22 | End: 2024-03-18 | Stop reason: HOSPADM

## 2022-12-08 NOTE — PATIENT INSTRUCTIONS
Your Diabetes Foot Care Program    Every day you depend on your feet to keep you moving. But when you have diabetes, your feet need special care. Even a small foot problem can become very serious. So dont take your feet for granted. By working with your diabetes healthcare team, you can learn how to protect your feet and keep them healthy.  Evaluating your feet  An evaluation helps your healthcare provider check the condition of your feet. The evaluation includes a review of your diabetes history and overall health. It may also include a foot exam, X-rays, or other tests. These can help show problems beneath the skin that you cant see or feel.  Medical history  You will be asked about your overall health and any history of foot problems. Youll also discuss your diabetes history, such as whether your blood sugar level has changed over time. It also includes questions about sensations of pain, tingling, pins and needles, or numbness. Your healthcare provider will also want to know if you have high blood pressure and heart disease, or if you smoke. Be sure to mention any medicines (including over-the-counter), supplements, or herbal remedies you take.  Foot exam  A foot exam checks the condition of different parts of your foot. First, your skin and nails are examined for any signs of infection. Blood flow is checked by feeling for the pulses in each foot. You may also have tests to study the nerves in the foot. These include using a small filament (wire) to see how sensitive your feet are. In certain cases, you will be asked to walk a short distance to check for bone, joint, and muscle problems.  Diagnostic tests  If needed, your healthcare provider will suggest certain tests to learn more about your feet. These include:  Doppler tests to measure blood flow in the feet and lower leg.  X-rays, which can show bone or joint problems.  Other imaging tests, such as an MRI (magnetic resonance imaging), bone scan, and CT  (computed tomography) scan. These can help show bone infections.  Other tests, such as vascular tests, which study the blood flow in your feet and legs. You may also have nerve studies to learn how sensitive your feet are.  Creating a foot care program  Based on the evaluation, your healthcare provider will create a foot care program for you. Your program may be as simple as starting a daily self-care routine and changing the types of shoes your wear. It may also involve treating minor foot problems, such as a corn or blister. In some cases, surgery will be needed to treat an infection or mechanical problems, such as hammer toes.  Preventing problems  When you have diabetes, its easier to prevent problems than to treat them later on. So see your healthcare team for regular checkups and foot care. Your healthcare team can also help you learn more about caring for your feet at home. For example, you may be told to avoid walking barefoot. Or you may be told that special footwear is needed to protect your feet.  Have regular checkups  Foot problems can develop quickly. So be sure to follow your healthcare teams schedule for regular checkups. During office visits, take off your shoes and socks as soon as you get in the exam room. Ask your healthcare provider to examine your feet for problems. This will make it easier to find and treat small skin irritations before they get worse. Regular checkups can also help keep track of the blood flow and feeling in your feet. If you have neuropathy (lack of feeling in your feet), you will need to have checkups more often.  Learn about self-care  The more you know about diabetes and your feet, the easier it will be to prevent problems. Members of your healthcare team can teach you how to inspect your feet and teach you to look for warning signs. They can also give you other foot care tips. During office visits, be sure to ask any questions you have.  Date Last Reviewed: 7/1/2016  ©  "1935-3803 [employer]. [address] [place] [address], [place] [address]. All rights reserved. This information is not intended as a substitute for professional medical care. Always follow your healthcare professional's instructions.       Adult Acquired Flatfoot  A variety of foot problems can lead to adult acquired flatfoot deformity (AAFD), a condition that results in a fallen arch with the foot pointed outward.    Most people -- no matter what the cause of their flatfoot -- can be helped with orthotics, braces and physical therapy. In patients who have tried these treatments without any relief, surgery can be a very effective way to help with the pain and deformity.    This article provides a brief overview of the problems that can result in AAFD. Further details regarding the most common conditions that cause an acquired flatfoot and their treatment options are provided in separate articles. Links to those articles are provided.    One of the more common signs of flatfoot is the "too many toes" sign. Even the big toe can be seen from the back of this patient's foot. In a normal foot, only the fourth and fifth toes should be visible.    Symptoms  Depending on the cause of the flatfoot, a patient may experience one or more of the different symptoms below:  Pain along the course of the posterior tibial tendon which lies on the inside of the foot and ankle. This can be associated with swelling on the inside of the ankle.  Pain that is worse with activity. High intensity or impact activities, such as running, can be very difficult. Some patients can have difficulty walking or even standing for long periods of time.  When the foot collapses, the heel bone may shift position and put pressure on the outside ankle bone (fibula). This can cause pain on the outside of the ankle. Arthritis in the heel also causes this same type of pain.  Patients with an old injury or arthritis in the middle of the foot can have " painful, bony bumps on the top and inside of the foot. These make shoewear very difficult. Occasionally, the bony spurs are so large that they pinch the nerves which can result in numbness and tingling on the top of the foot and into the toes.  Diabetics may only notice swelling or a large bump on the bottom of the foot. Because their sensation is affected, people with diabetes may not have any pain. The large bump can cause skin problems and an ulcer (a sore that does not heal) may develop if proper diabetic shoewear is not used.    Cause  As discussed above, many health conditions can create a painful flatfoot.

## 2022-12-08 NOTE — PROGRESS NOTES
"  [address] [facility] [address]  [place] [address]  Phone: [phone]   [phone]    Patient's PCP:Tony Sadler MD  Referring Provider: Aaareferral Self    Subjective:      Chief Complaint:: Diabetic Foot Exam (Yearly foot exam)    SEGUNDO Taylor is a 72 y.o. female who presents to the clinic for a diabetic foot exam.   Pt has seen DEEJAY Adams PA-C on 11/03/2022 who treats them for their diabetes.  Pt has been a diabetic for 20 years.  Taking tirzepaidie to treat diabetes.    Blood sugar: 1124  Hemoglobin A1C: 8.3     Vitals:    12/08/22 1409   Resp: 18   SpO2: 98%   Weight: 131.5 kg (290 lb)   Height: 5' 7" (1.702 m)   PainSc: 0-No pain      Shoe Size: 11-12    Past Surgical History:   Procedure Laterality Date    AORTOGRAPHY N/A 12/04/2020    Procedure: Aortogram;  Surgeon: Paul Pedersen MD;  Location: [facility] CATH;  Service: Cardiology;  Laterality: N/A;    BREAST SURGERY      CARDIAC CATHETERIZATION  12/2020    CHOLECYSTECTOMY      COLONOSCOPY      multi -last 2014     CORONARY ARTERY BYPASS GRAFT      ESOPHAGOGASTRODUODENOSCOPY      2012     FRACTURE SURGERY      HAND SURGERY Right     HYSTERECTOMY      LAPAROSCOPIC ROBOT-ASSISTED SURGICAL REMOVAL OF KIDNEY USING DA CHELLE XI Right 03/10/2022    Procedure: XI ROBOTIC NEPHRECTOMY- radical;  Surgeon: Rolando Ramirez MD;  Location: [facility] OR;  Service: Urology;  Laterality: Right;    MASTECTOMY W/ SENTINEL NODE BIOPSY Bilateral 01/21/2015    bilateral "dog ears"    NASAL SINUS SURGERY  2015    Dr Bryant FESS/cauterization turbinate     PARTIAL HYSTERECTOMY  1989    PERCUTANEOUS TRANSLUMINAL BALLOON ANGIOPLASTY OF CORONARY ARTERY  12/04/2020    Procedure: Angioplasty-coronary;  Surgeon: Paul Pedersen MD;  Location: [facility] CATH;  Service: Cardiology;;    RENAL BIOPSY Right     9/20/2021 EJ    TUBAL LIGATION      ULTRASOUND GUIDANCE  12/04/2020    Procedure: Ultrasound Guidance;  Surgeon: Paul Pedersen MD;  Location: [facility] CATH;  " Service: Cardiology;;     Past Medical History:   Diagnosis Date    Anticoagulant long-term use     Arthritis     Breast cancer 2014    invasive lobular carcinoma    Cancer of kidney 11/2020    RIGHT KIDNEY CANCER    CHF (congestive heart failure)     Coronary artery disease dx 2005    Depression     Diabetes mellitus     Diastolic heart failure secondary to hypertension     Gout     Hyperlipemia     Hypertension     Hypertrophy of nasal turbinates     Kidney mass 2020    Right    Levoscoliosis     Lung nodule     left    Multiple thyroid nodules     NICOLE (obstructive sleep apnea)     uses C-PAP    Pulmonary hypertension      Family History   Problem Relation Age of Onset    Breast cancer Mother     Stroke Father     Hypertension Father     Hepatitis Brother     Asthma Daughter     Birth defects Daughter         Nell's two children has cleft lips    Depression Daughter     Drug abuse Daughter     Learning disabilities Daughter     Mental illness Daughter     Breast cancer Maternal Aunt     Glaucoma Sister     Drug abuse Daughter     Macular degeneration Neg Hx     Retinal detachment Neg Hx         Social History:   Marital Status: Single  Alcohol History:  reports no history of alcohol use.  Tobacco History:  reports that she quit smoking about 5 years ago. Her smoking use included cigarettes. She smoked an average of 1.00 packs per day. She has never used smokeless tobacco.  Drug History:  reports no history of drug use.    Review of patient's allergies indicates:   Allergen Reactions    Percocet [oxycodone-acetaminophen] Itching    Irbesartan Swelling    Januvia [sitagliptin]     Jardiance [empagliflozin]      Leg cramps    Lipitor [atorvastatin] Other (See Comments)     Severe leg pain    Linaclotide Other (See Comments) and Nausea And Vomiting     Does not remember    Lubiprostone Other (See Comments) and Palpitations     Does not remember       Current Outpatient Medications   Medication Sig Dispense Refill     allopurinoL (ZYLOPRIM) 300 MG tablet Take 0.5 tablets (150 mg total) by mouth once daily. 45 tablet 2    amLODIPine (NORVASC) 10 MG tablet Take 1 tablet (10 mg total) by mouth once daily. 90 tablet 0    blood sugar diagnostic (TRUE METRIX GLUCOSE TEST STRIP) Strp 1 strip by Misc.(Non-Drug; Combo Route) route 2 (two) times daily as needed (Type 2 DM). 200 strip 12    calcitonin, salmon, (FORTICAL) 200 unit/actuation nasal spray 1 spray by Nasal route once daily. Alternate nostrils 3.7 mL 6    calcitRIOL (ROCALTROL) 0.5 MCG Cap Take 0.5 mcg by mouth once daily.      carvediloL (COREG) 25 MG tablet TAKE ONE TABLET BY MOUTH TWICE DAILY (Patient taking differently: Take 25 mg by mouth 2 (two) times daily with meals.) 180 tablet 2    ELIQUIS 5 mg Tab TAKE ONE TABLET BY MOUTH TWICE DAILY 60 tablet 2    furosemide (LASIX) 40 MG tablet       lancets Misc To check BG 1 times daily, to use with insurance preferred meter 100 each 3    levothyroxine (SYNTHROID) 50 MCG tablet 1 tab po 5 x weekly 60 tablet 2    magnesium oxide (MAG-OX) 400 mg (241.3 mg magnesium) tablet Take 1 tablet (400 mg total) by mouth daily as needed (cramping). 30 tablet 0    montelukast (SINGULAIR) 10 mg tablet TAKE ONE TABLET BY MOUTH EVERY EVENING FOR allergies 90 tablet 3    nitroGLYCERIN (NITROSTAT) 0.4 MG SL tablet Place 1 tablet (0.4 mg total) under the tongue every 5 (five) minutes as needed for Chest pain. 25 tablet 0    PRALUENT PEN 75 mg/mL PnIj Inject 1 mL (75 mg total) into the skin every 14 (fourteen) days. 6 mL 3    tirzepatide (MOUNJARO) 5 mg/0.5 mL PnIj Inject 5 mg into the skin every 7 days. 4 pen 11     No current facility-administered medications for this visit.       Review of Systems      Objective:        Physical Exam:   Foot Exam    General  General Appearance: appears stated age and healthy   Orientation: alert and oriented to person, place, and time   Affect: appropriate   Gait: unimpaired       Right Foot/Ankle     Inspection and  Palpation  Ecchymosis: none  Tenderness: none   Swelling: none   Arch: pes planus  Hammertoes: absent  Claw Toes: absent  Hallux valgus: no  Hallux limitus: no  Skin Exam: dry skin and tinea;   Fungus Toenails: present    Neurovascular  Dorsalis pedis: 1+  Posterior tibial: 1+  Capillary Refill: 2+  Varicose veins: present  Saphenous nerve sensation: normal  Tibial nerve sensation: normal  Superficial peroneal nerve sensation: normal  Deep peroneal nerve sensation: normal  Sural nerve sensation: normal    Muscle Strength  Ankle dorsiflexion: 5  Ankle plantar flexion: 5  Ankle inversion: 5  Ankle eversion: 5  Great toe extension: 5  Great toe flexion: 5    Range of Motion    Normal right ankle ROM      Left Foot/Ankle      Inspection and Palpation  Ecchymosis: none  Tenderness: none   Swelling: none   Arch: pes planus  Hammertoes: absent  Claw toes: absent  Hallux valgus: no  Hallux limitus: no  Skin Exam: dry skin and tinea;   Fungus Toenails: present    Neurovascular  Dorsalis pedis: 1+  Posterior tibial: 1+  Capillary refill: 2+  Varicose veins: present  Saphenous nerve sensation: normal  Tibial nerve sensation: normal  Superficial peroneal nerve sensation: normal  Deep peroneal nerve sensation: normal  Sural nerve sensation: normal    Muscle Strength  Ankle dorsiflexion: 5  Ankle plantar flexion: 5  Ankle inversion: 5  Ankle eversion: 5  Great toe extension: 5  Great toe flexion: 5    Range of Motion    Normal left ankle ROM      Physical Exam  Cardiovascular:      Pulses:           Dorsalis pedis pulses are 1+ on the right side and 1+ on the left side.        Posterior tibial pulses are 1+ on the right side and 1+ on the left side.   Musculoskeletal:      Right foot: No bunion.      Left foot: No bunion.   Feet:      Right foot:      Skin integrity: Dry skin present.      Toenail Condition: Fungal disease present.     Left foot:      Skin integrity: Dry skin present.      Toenail Condition: Fungal disease  present.            Right Ankle/Foot Exam     Range of Motion   The patient has normal right ankle ROM.    Left Ankle/Foot Exam     Range of Motion   The patient has normal left ankle ROM.     Muscle Strength   The patient has normal left ankle strength.      Muscle Strength   Right Lower Extremity   Ankle Dorsiflexion:  5   Plantar flexion:  5/5  Left Lower Extremity   Ankle Dorsiflexion:  5   Plantar flexion:  5/5     Vascular Exam     Right Pulses  Dorsalis Pedis:      1+  Posterior Tibial:      1+        Left Pulses  Dorsalis Pedis:      1+  Posterior Tibial:      1+         Imaging:            Assessment:       1. Encounter for diabetic foot exam    2. Pes planus of both feet    3. Diabetic neuropathy with neurologic complication    4. Peripheral vascular disease, unspecified    5. Type 2 diabetes mellitus with hyperglycemia, without long-term current use of insulin      Plan:   Encounter for diabetic foot exam    Pes planus of both feet    Diabetic neuropathy with neurologic complication    Peripheral vascular disease, unspecified    Type 2 diabetes mellitus with hyperglycemia, without long-term current use of insulin  Evaluated patient today she has normal neurological status palpable pedal pulses she has had lower extremity arterial Dopplers in the past that showed no significant problems.  She does have pes planus and dry skin.  She is a once a year evaluation she will monitor feet look for any changes.  We are giving her a prescription for diabetic shoes return in 1 year or as needed.  Recommend continue skin softeners.  Return 1 year    Procedures          Counseling:     I provided patient education verbally regarding:   Patient diagnosis, treatment options, as well as alternatives, risks, and benefits.     Counseling/Education:  I provided patient education verbally regarding:   The aspects of diabetes and how it pertains to the feet. I explained the importance of proper diabetic foot care and how it is  essential for the health of their feet.    I discussed the importance of knowing their Hemoglobin A1c and that the level needs to be as close to 6 as possible. I discussed the increase complications of high blood sugar including stroke, blindness, heart attack, kidney failure and loss of limb secondary to neuropathy and PVD.     With neuropathy, beware of any breaks in the skin or redness. These areas are not recognized early due to the numbness.    I discussed Diabetes, lower back issues, metabolic disorders, systemic causes, chemotherapy, vitamin deficiency, heavy metal exposure, as some of the causes. I also explained that as much as 40% of the time we can not find a cause. I discussed different treatments available to control the symptoms but which may not cure the problem.                This note was created using Dragon voice recognition software that occasionally misinterpreted phrases or words.

## 2022-12-19 ENCOUNTER — PATIENT OUTREACH (OUTPATIENT)
Dept: ADMINISTRATIVE | Facility: HOSPITAL | Age: 72
End: 2022-12-19
Payer: MEDICARE

## 2022-12-19 NOTE — PROGRESS NOTES
UNCONTROLLED A1C REPORT.  PATIENT HAS AN UPCOMING LAB APPOINTMENT.  CHART REVIEWED AND LABS LINKED IF NEEDED.    Hemoglobin A1C   Date Value Ref Range Status   11/03/2022 8.3 (H) 4.0 - 5.6 % Final     Comment:     ADA Screening Guidelines:  5.7-6.4%  Consistent with prediabetes  >or=6.5%  Consistent with diabetes    High levels of fetal hemoglobin interfere with the HbA1C  assay. Heterozygous hemoglobin variants (HbS, HgC, etc)do  not significantly interfere with this assay.   However, presence of multiple variants may affect accuracy.     07/15/2022 7.1 (H) 4.0 - 5.6 % Final     Comment:     ADA Screening Guidelines:  5.7-6.4%  Consistent with prediabetes  >or=6.5%  Consistent with diabetes    High levels of fetal hemoglobin interfere with the HbA1C  assay. Heterozygous hemoglobin variants (HbS, HgC, etc)do  not significantly interfere with this assay.   However, presence of multiple variants may affect accuracy.     02/16/2022 5.5 4.0 - 5.6 % Final     Comment:     ADA Screening Guidelines:  5.7-6.4%  Consistent with prediabetes  >or=6.5%  Consistent with diabetes    High levels of fetal hemoglobin interfere with the HbA1C  assay. Heterozygous hemoglobin variants (HbS, HgC, etc)do  not significantly interfere with this assay.   However, presence of multiple variants may affect accuracy.

## 2022-12-28 ENCOUNTER — LAB VISIT (OUTPATIENT)
Dept: LAB | Facility: HOSPITAL | Age: 72
End: 2022-12-28
Attending: PHYSICIAN ASSISTANT
Payer: MEDICARE

## 2022-12-28 DIAGNOSIS — E04.1 NODULAR THYROID DISEASE: ICD-10-CM

## 2022-12-28 LAB
T4 FREE SERPL-MCNC: 1.17 NG/DL (ref 0.71–1.51)
TSH SERPL DL<=0.005 MIU/L-ACNC: 0.13 UIU/ML (ref 0.4–4)

## 2022-12-28 PROCEDURE — 36415 COLL VENOUS BLD VENIPUNCTURE: CPT | Mod: PO | Performed by: PHYSICIAN ASSISTANT

## 2022-12-28 PROCEDURE — 84443 ASSAY THYROID STIM HORMONE: CPT | Performed by: PHYSICIAN ASSISTANT

## 2022-12-28 PROCEDURE — 84439 ASSAY OF FREE THYROXINE: CPT | Performed by: PHYSICIAN ASSISTANT

## 2023-01-10 ENCOUNTER — PATIENT MESSAGE (OUTPATIENT)
Dept: FAMILY MEDICINE | Facility: CLINIC | Age: 73
End: 2023-01-10
Payer: MEDICARE

## 2023-01-10 DIAGNOSIS — Z90.13 STATUS POST BILATERAL MASTECTOMY: Primary | ICD-10-CM

## 2023-02-10 ENCOUNTER — OFFICE VISIT (OUTPATIENT)
Dept: OPTOMETRY | Facility: CLINIC | Age: 73
End: 2023-02-10
Payer: MEDICARE

## 2023-02-10 DIAGNOSIS — H40.013 OPEN ANGLE WITH BORDERLINE FINDINGS OF BOTH EYES: Primary | ICD-10-CM

## 2023-02-10 DIAGNOSIS — E11.9 DIABETES MELLITUS TYPE 2 WITHOUT RETINOPATHY: ICD-10-CM

## 2023-02-10 DIAGNOSIS — H52.7 REFRACTIVE ERROR: ICD-10-CM

## 2023-02-10 DIAGNOSIS — E11.36 DIABETIC CATARACT: ICD-10-CM

## 2023-02-10 DIAGNOSIS — H26.9 CORTICAL CATARACT: ICD-10-CM

## 2023-02-10 DIAGNOSIS — H25.13 NUCLEAR SCLEROSIS, BILATERAL: ICD-10-CM

## 2023-02-10 PROCEDURE — 99999 PR PBB SHADOW E&M-EST. PATIENT-LVL III: CPT | Mod: PBBFAC,,, | Performed by: OPTOMETRIST

## 2023-02-10 PROCEDURE — 99214 OFFICE O/P EST MOD 30 MIN: CPT | Mod: S$PBB,,, | Performed by: OPTOMETRIST

## 2023-02-10 PROCEDURE — 99999 PR PBB SHADOW E&M-EST. PATIENT-LVL III: ICD-10-PCS | Mod: PBBFAC,,, | Performed by: OPTOMETRIST

## 2023-02-10 PROCEDURE — 99214 PR OFFICE/OUTPT VISIT, EST, LEVL IV, 30-39 MIN: ICD-10-PCS | Mod: S$PBB,,, | Performed by: OPTOMETRIST

## 2023-02-10 PROCEDURE — 99213 OFFICE O/P EST LOW 20 MIN: CPT | Mod: PBBFAC,PO | Performed by: OPTOMETRIST

## 2023-02-10 RX ORDER — LATANOPROST 50 UG/ML
1 SOLUTION/ DROPS OPHTHALMIC NIGHTLY
Qty: 7.5 ML | Refills: 3 | Status: SHIPPED | OUTPATIENT
Start: 2023-02-10 | End: 2023-03-03 | Stop reason: SDUPTHER

## 2023-02-10 RX ORDER — TRAMADOL HYDROCHLORIDE 50 MG/1
TABLET ORAL
COMMUNITY
Start: 2023-01-20 | End: 2023-11-15

## 2023-02-10 NOTE — PROGRESS NOTES
HPI     Diabetic Eye Exam     Additional comments: LDE: 10/09/2020           Comments    73 YO female presents today for an annual diabetic eye exam. Patient   states that she is having trouble with seeing at night time.     Wears current glasses all the time from 2022 at [facility].     Hemoglobin A1C       Date                     Value               Ref Range             Status                11/03/2022               8.3 (H)             4.0 - 5.6 %           Final                  07/15/2022               7.1 (H)             4.0 - 5.6 %           Final                  02/16/2022               5.5                 4.0 - 5.6 %           Final                      Last edited by Juve De La Fuente, OD on 2/10/2023  1:53 PM.            Assessment /Plan     For exam results, see Encounter Report.    Open angle with borderline findings of both eyes  -     latanoprost 0.005 % ophthalmic solution; Place 1 drop into both eyes every evening.  Dispense: 7.5 mL; Refill: 3    Diabetes mellitus type 2 without retinopathy    Diabetic cataract    Nuclear sclerosis, bilateral    Cortical cataract    Refractive error      1. Open angle with borderline findings of both eyes  Highly suspicious nerves OD >OS  Thin rim, excavated - thinner than previously  noted  + fam hx: sister  Discussed options - will start latanoprost qPM OU  Return for hvf  oct  pachy  iop check    - latanoprost 0.005 % ophthalmic solution; Place 1 drop into both eyes every evening.  Dispense: 7.5 mL; Refill: 3  - OCT - Optic Nerve; Future  - Bragg Visual Field - OU - Extended - Both Eyes; Future    2. Diabetes mellitus type 2 without retinopathy  Discussed possible ocular affects of uncontrolled blood sugar with patient. Recommended continued strong blood sugar control and continued care with PCP. Monitor yearly.     3. Diabetic cataract  4. Nuclear sclerosis, bilateral  5. Cortical cataract  Mild to moderate, not yet significant. Discussed possible ocular  affects of cataracts. Acceptable BCVA OU. Discussed treatment options. Surgery not recommended at this time. Monitor yearly.     6. Refractive error  Declines refraction, prefers to go [facility] or in-network Medicaid provider

## 2023-02-17 DIAGNOSIS — E03.9 HYPOTHYROIDISM, UNSPECIFIED TYPE: Primary | ICD-10-CM

## 2023-02-17 RX ORDER — LEVOTHYROXINE SODIUM 25 UG/1
25 TABLET ORAL
Qty: 30 TABLET | Refills: 11 | Status: SHIPPED | OUTPATIENT
Start: 2023-02-17 | End: 2023-07-10

## 2023-02-20 ENCOUNTER — PATIENT OUTREACH (OUTPATIENT)
Dept: ADMINISTRATIVE | Facility: HOSPITAL | Age: 73
End: 2023-02-20
Payer: MEDICARE

## 2023-02-20 NOTE — PROGRESS NOTES
UNCONTROLLED A1C REPORT.  PATIENT HAS AN UPCOMING LAB APPOINTMENT.  CHART REVIEWED;  LABS ORDERED AND LINKED WHAT IS NEEDED    Hemoglobin A1C   Date Value Ref Range Status   11/03/2022 8.3 (H) 4.0 - 5.6 % Final     Comment:     ADA Screening Guidelines:  5.7-6.4%  Consistent with prediabetes  >or=6.5%  Consistent with diabetes    High levels of fetal hemoglobin interfere with the HbA1C  assay. Heterozygous hemoglobin variants (HbS, HgC, etc)do  not significantly interfere with this assay.   However, presence of multiple variants may affect accuracy.     07/15/2022 7.1 (H) 4.0 - 5.6 % Final     Comment:     ADA Screening Guidelines:  5.7-6.4%  Consistent with prediabetes  >or=6.5%  Consistent with diabetes    High levels of fetal hemoglobin interfere with the HbA1C  assay. Heterozygous hemoglobin variants (HbS, HgC, etc)do  not significantly interfere with this assay.   However, presence of multiple variants may affect accuracy.     02/16/2022 5.5 4.0 - 5.6 % Final     Comment:     ADA Screening Guidelines:  5.7-6.4%  Consistent with prediabetes  >or=6.5%  Consistent with diabetes    High levels of fetal hemoglobin interfere with the HbA1C  assay. Heterozygous hemoglobin variants (HbS, HgC, etc)do  not significantly interfere with this assay.   However, presence of multiple variants may affect accuracy.

## 2023-03-03 ENCOUNTER — CLINICAL SUPPORT (OUTPATIENT)
Dept: OPHTHALMOLOGY | Facility: CLINIC | Age: 73
End: 2023-03-03
Payer: MEDICARE

## 2023-03-03 ENCOUNTER — OFFICE VISIT (OUTPATIENT)
Dept: OPTOMETRY | Facility: CLINIC | Age: 73
End: 2023-03-03
Payer: MEDICARE

## 2023-03-03 ENCOUNTER — LAB VISIT (OUTPATIENT)
Dept: LAB | Facility: HOSPITAL | Age: 73
End: 2023-03-03
Payer: MEDICARE

## 2023-03-03 DIAGNOSIS — H40.1131 PRIMARY OPEN ANGLE GLAUCOMA (POAG) OF BOTH EYES, MILD STAGE: Primary | ICD-10-CM

## 2023-03-03 DIAGNOSIS — E11.65 TYPE 2 DIABETES MELLITUS WITH HYPERGLYCEMIA, WITHOUT LONG-TERM CURRENT USE OF INSULIN: ICD-10-CM

## 2023-03-03 DIAGNOSIS — H40.013 OPEN ANGLE WITH BORDERLINE FINDINGS OF BOTH EYES: ICD-10-CM

## 2023-03-03 PROCEDURE — 99213 PR OFFICE/OUTPT VISIT, EST, LEVL III, 20-29 MIN: ICD-10-PCS | Mod: S$PBB,,, | Performed by: OPTOMETRIST

## 2023-03-03 PROCEDURE — 83036 HEMOGLOBIN GLYCOSYLATED A1C: CPT | Performed by: PHYSICIAN ASSISTANT

## 2023-03-03 PROCEDURE — 36415 COLL VENOUS BLD VENIPUNCTURE: CPT | Mod: PO | Performed by: PHYSICIAN ASSISTANT

## 2023-03-03 PROCEDURE — 99213 OFFICE O/P EST LOW 20 MIN: CPT | Mod: PBBFAC,PO | Performed by: OPTOMETRIST

## 2023-03-03 PROCEDURE — 76514 PR  US, EYE, FOR CORNEAL THICKNESS: ICD-10-PCS | Mod: 26,S$PBB,, | Performed by: OPTOMETRIST

## 2023-03-03 PROCEDURE — 99999 PR PBB SHADOW E&M-EST. PATIENT-LVL III: ICD-10-PCS | Mod: PBBFAC,,, | Performed by: OPTOMETRIST

## 2023-03-03 PROCEDURE — 99999 PR PBB SHADOW E&M-EST. PATIENT-LVL III: CPT | Mod: PBBFAC,,, | Performed by: OPTOMETRIST

## 2023-03-03 PROCEDURE — 80069 RENAL FUNCTION PANEL: CPT | Performed by: PHYSICIAN ASSISTANT

## 2023-03-03 PROCEDURE — 99213 OFFICE O/P EST LOW 20 MIN: CPT | Mod: S$PBB,,, | Performed by: OPTOMETRIST

## 2023-03-03 PROCEDURE — 76514 ECHO EXAM OF EYE THICKNESS: CPT | Mod: 26,S$PBB,, | Performed by: OPTOMETRIST

## 2023-03-03 PROCEDURE — 76514 ECHO EXAM OF EYE THICKNESS: CPT | Mod: PBBFAC,PO | Performed by: OPTOMETRIST

## 2023-03-03 RX ORDER — LATANOPROST 50 UG/ML
1 SOLUTION/ DROPS OPHTHALMIC NIGHTLY
Qty: 7.5 ML | Refills: 3 | Status: SHIPPED | OUTPATIENT
Start: 2023-03-03 | End: 2023-10-13

## 2023-03-03 NOTE — PROGRESS NOTES
HPI    Pt here today for IOP check, rev hvf  & oct.    States has noticed some   foggy vision OS only today, OD vision good.    Denies any eye pain or pressure.    Latanoprost OU qhs -- good compliance, has only missed 3 doses x 3 weeks.    Needs rx resent to pharmacy -- was supposed to be 3 bottles (90-day) but   only received 1 small bottle.  Last edited by Juve De La Fuente, OD on 3/3/2023  3:22 PM.            Assessment /Plan     For exam results, see Encounter Report.    Primary open angle glaucoma (POAG) of both eyes, mild stage  -     latanoprost 0.005 % ophthalmic solution; Place 1 drop into both eyes every evening.  Dispense: 7.5 mL; Refill: 3      Reviewed HVF  OCT results with patient  /527  IOP improved with use of drops  Continue latanoprost qPM OU  Recheck iop in 5 months, sooner prn

## 2023-03-03 NOTE — PROGRESS NOTES
HVF 24-2 and OCT RNFL done today           Assessment /Plan     For exam results, see Encounter Report.    Open angle with borderline findings of both eyes  -     OCT - Optic Nerve  -     Bragg Visual Field - OU - Extended - Both Eyes

## 2023-03-04 LAB
ALBUMIN SERPL BCP-MCNC: 3.7 G/DL (ref 3.5–5.2)
ANION GAP SERPL CALC-SCNC: 9 MMOL/L (ref 8–16)
BUN SERPL-MCNC: 54 MG/DL (ref 8–23)
CALCIUM SERPL-MCNC: 10.7 MG/DL (ref 8.7–10.5)
CHLORIDE SERPL-SCNC: 101 MMOL/L (ref 95–110)
CO2 SERPL-SCNC: 28 MMOL/L (ref 23–29)
CREAT SERPL-MCNC: 3.5 MG/DL (ref 0.5–1.4)
EST. GFR  (NO RACE VARIABLE): 13.5 ML/MIN/1.73 M^2
ESTIMATED AVG GLUCOSE: 140 MG/DL (ref 68–131)
GLUCOSE SERPL-MCNC: 113 MG/DL (ref 70–110)
HBA1C MFR BLD: 6.5 % (ref 4–5.6)
PHOSPHATE SERPL-MCNC: 4 MG/DL (ref 2.7–4.5)
POTASSIUM SERPL-SCNC: 4.6 MMOL/L (ref 3.5–5.1)
SODIUM SERPL-SCNC: 138 MMOL/L (ref 136–145)

## 2023-03-10 ENCOUNTER — OFFICE VISIT (OUTPATIENT)
Dept: ENDOCRINOLOGY | Facility: CLINIC | Age: 73
End: 2023-03-10
Payer: MEDICARE

## 2023-03-10 VITALS
BODY MASS INDEX: 44.22 KG/M2 | OXYGEN SATURATION: 95 % | TEMPERATURE: 99 F | HEART RATE: 78 BPM | HEIGHT: 67 IN | SYSTOLIC BLOOD PRESSURE: 140 MMHG | WEIGHT: 281.75 LBS | DIASTOLIC BLOOD PRESSURE: 86 MMHG

## 2023-03-10 DIAGNOSIS — K21.9 GASTROESOPHAGEAL REFLUX DISEASE, UNSPECIFIED WHETHER ESOPHAGITIS PRESENT: ICD-10-CM

## 2023-03-10 DIAGNOSIS — G47.33 OSA (OBSTRUCTIVE SLEEP APNEA): ICD-10-CM

## 2023-03-10 DIAGNOSIS — E78.5 HYPERLIPIDEMIA, UNSPECIFIED HYPERLIPIDEMIA TYPE: ICD-10-CM

## 2023-03-10 DIAGNOSIS — I10 ESSENTIAL HYPERTENSION: ICD-10-CM

## 2023-03-10 DIAGNOSIS — E66.01 OBESITY, CLASS III, BMI 40-49.9 (MORBID OBESITY): ICD-10-CM

## 2023-03-10 DIAGNOSIS — N25.81 SECONDARY RENAL HYPERPARATHYROIDISM: ICD-10-CM

## 2023-03-10 DIAGNOSIS — E55.9 HYPOVITAMINOSIS D: ICD-10-CM

## 2023-03-10 DIAGNOSIS — E11.65 TYPE 2 DIABETES MELLITUS WITH HYPERGLYCEMIA, WITHOUT LONG-TERM CURRENT USE OF INSULIN: ICD-10-CM

## 2023-03-10 DIAGNOSIS — M10.00 IDIOPATHIC GOUT, UNSPECIFIED CHRONICITY, UNSPECIFIED SITE: ICD-10-CM

## 2023-03-10 DIAGNOSIS — E04.1 NODULAR THYROID DISEASE: Primary | ICD-10-CM

## 2023-03-10 PROCEDURE — 99214 OFFICE O/P EST MOD 30 MIN: CPT | Mod: S$PBB,,, | Performed by: PHYSICIAN ASSISTANT

## 2023-03-10 PROCEDURE — 99215 OFFICE O/P EST HI 40 MIN: CPT | Mod: PBBFAC,PO | Performed by: PHYSICIAN ASSISTANT

## 2023-03-10 PROCEDURE — 99214 PR OFFICE/OUTPT VISIT, EST, LEVL IV, 30-39 MIN: ICD-10-PCS | Mod: S$PBB,,, | Performed by: PHYSICIAN ASSISTANT

## 2023-03-10 PROCEDURE — 99999 PR PBB SHADOW E&M-EST. PATIENT-LVL V: ICD-10-PCS | Mod: PBBFAC,,, | Performed by: PHYSICIAN ASSISTANT

## 2023-03-10 PROCEDURE — 99999 PR PBB SHADOW E&M-EST. PATIENT-LVL V: CPT | Mod: PBBFAC,,, | Performed by: PHYSICIAN ASSISTANT

## 2023-03-10 RX ORDER — FAMOTIDINE 20 MG/1
20 TABLET, FILM COATED ORAL DAILY
Qty: 30 TABLET | Refills: 11 | Status: SHIPPED | OUTPATIENT
Start: 2023-03-10 | End: 2023-11-15

## 2023-03-10 NOTE — PROGRESS NOTES
CC: DM/thyroid nodule/hypothyroidism    HPI: Juhi Taylor was diagnosed with Type 2 DM ~15 years ago. No hospitalizations for DM. Her daughter has DM. No fhx of thyroid disease.     Hyperparathyroidism.   No n/v.  Occ abdominal pain. No vomiting. Kidney stones in . She had a CT in  that was apparently normal.    CURRENT DIABETIC MEDS: Mounjaro 7.5 mg q weekly    Fastin-140    Hypoglycemia: None  Type of Glucose Meter: one touch verio    Physical Activity: riding a stationary bike every morning    Dietary Habits: 1-2 meal daily. Drinks water.     Last Eye Exam:  [facility]  Last Podiatry Exam:  Dr. Nicholson     Last DM Education Attended: Last year    Thyroid nodule  Dx 5-10 years ago  Bx in  of left thyroid nodule was benign  Last thyroid u/s  showed a dominant nodule 1.5 cm in the right lobe which could not be reached on previous attempt at FNA in  . Left lobe has a 2.4 cm nodule that has not changed since the prior exam.  No new voice changes, sob, dysphagia.     Hypothyroidism  Dx 10 years ago  LT4 25 mcg daily  +  hair loss (hereditary), brittle nails, occ palpitations, cold intolerance  No insomnia, constipation/diarrhea.     NICOLE-wears cpap    Social Hx: smoked 1 ppd for 40 years. She quit 2 years ago.  No ETOH use.     DEXA : Osteopenia. Taking 2000 IU vd daily. Follows w/ urology.     Wt Readings from Last 11 Encounters:   03/10/23 127.8 kg (281 lb 12 oz)   23 131.6 kg (290 lb 3.2 oz)   22 131.5 kg (290 lb)   22 131.5 kg (290 lb)   22 131.5 kg (290 lb)   22 131.6 kg (290 lb 2 oz)   22 133.7 kg (294 lb 13.8 oz)   22 133.4 kg (294 lb 1.5 oz)   22 126.5 kg (278 lb 14.1 oz)   22 125.6 kg (276 lb 14.4 oz)   22 125.2 kg (276 lb)      REVIEW OF SYSTEMS,  General: no weakness or fatigue, + wt loss (9 lbs).   Eyes: no intermittent blurry vision or visual disturbances.   Cardiac: + leg swelling, no chest pain  "or palpitations.   Respiratory: no cough or dyspnea.   GI: no abdominal pain or nausea.   Skin: no rashes or itching.   Neuro: no numbness or tingling.   Musc: + back pain, leg pain  Endocrine: no polyuria, polydipsia, polyphagia.   Remainder ROS negative    Vital Signs  BP (!) 140/86 (BP Location: Right arm, Patient Position: Sitting, BP Method: Large (Manual))   Pulse 78   Temp 98.5 °F (36.9 °C) (Oral)   Ht 5' 7" (1.702 m)   Wt 127.8 kg (281 lb 12 oz)   SpO2 95%   BMI 44.13 kg/m²     Personally reviewed labs below with pt:     Hemoglobin A1C   Date Value Ref Range Status   03/03/2023 6.5 (H) 4.0 - 5.6 % Final     Comment:     ADA Screening Guidelines:  5.7-6.4%  Consistent with prediabetes  >or=6.5%  Consistent with diabetes    High levels of fetal hemoglobin interfere with the HbA1C  assay. Heterozygous hemoglobin variants (HbS, HgC, etc)do  not significantly interfere with this assay.   However, presence of multiple variants may affect accuracy.     11/03/2022 8.3 (H) 4.0 - 5.6 % Final     Comment:     ADA Screening Guidelines:  5.7-6.4%  Consistent with prediabetes  >or=6.5%  Consistent with diabetes    High levels of fetal hemoglobin interfere with the HbA1C  assay. Heterozygous hemoglobin variants (HbS, HgC, etc)do  not significantly interfere with this assay.   However, presence of multiple variants may affect accuracy.     07/15/2022 7.1 (H) 4.0 - 5.6 % Final     Comment:     ADA Screening Guidelines:  5.7-6.4%  Consistent with prediabetes  >or=6.5%  Consistent with diabetes    High levels of fetal hemoglobin interfere with the HbA1C  assay. Heterozygous hemoglobin variants (HbS, HgC, etc)do  not significantly interfere with this assay.   However, presence of multiple variants may affect accuracy.         Chemistry        Component Value Date/Time     03/03/2023 1328    K 4.6 03/03/2023 1328     03/03/2023 1328    CO2 28 03/03/2023 1328    BUN 54 (H) 03/03/2023 1328    CREATININE 3.5 (H) " 03/03/2023 1328     (H) 03/03/2023 1328        Component Value Date/Time    CALCIUM 10.7 (H) 03/03/2023 1328    ALKPHOS 96 10/10/2022 1136    AST 15 10/10/2022 1136    ALT 14 10/10/2022 1136    BILITOT 0.4 10/10/2022 1136    ESTGFRAFRICA 16.2 (A) 07/15/2022 1038    EGFRNONAA 14.0 (A) 07/15/2022 1038        Lab Results   Component Value Date    CHOL 135 10/10/2022    CHOL 140 08/16/2021    CHOL 153 04/14/2021     Lab Results   Component Value Date    HDL 39 (L) 10/10/2022    HDL 54 08/16/2021    HDL 45 04/14/2021     Lab Results   Component Value Date    LDLCALC 70.8 10/10/2022    LDLCALC 62.6 (L) 08/16/2021    LDLCALC 82.0 04/14/2021     Lab Results   Component Value Date    TRIG 126 10/10/2022    TRIG 117 08/16/2021    TRIG 130 04/14/2021     Lab Results   Component Value Date    CHOLHDL 28.9 10/10/2022    CHOLHDL 38.6 08/16/2021    CHOLHDL 29.4 04/14/2021     Lab Results   Component Value Date    TSH 0.133 (L) 12/28/2022     2016 u/s      2018 below      Lab Results   Component Value Date    MICALBCREAT 1068.1 (H) 12/28/2022     Vit D, 25-Hydroxy   Date Value Ref Range Status   12/28/2022 26 (L) 30 - 96 ng/mL Final     Comment:     Vitamin D deficiency.........<10 ng/mL                              Vitamin D insufficiency......10-29 ng/mL       Vitamin D sufficiency........> or equal to 30 ng/mL  Vitamin D toxicity............>100 ng/mL       EXAMINATION:  US SOFT TISSUE HEAD NECK THYROID     CLINICAL HISTORY:  Type 2 diabetes mellitus with hyperglycemia     TECHNIQUE:  Ultrasound of the thyroid and cervical lymph nodes was performed.     COMPARISON:  06/15/2020     FINDINGS:  The right hemithyroid measures 6.0 x 2.1 x 2.2 cm and the left 7.2 x 2.6 x 2.5 cm.  The thyroid isthmus measures 5 mm.  Total thyroid volume is 40 cc.     Multiple nodules are present bilaterally.  The largest on the right is a lower pole solid isoechoic nodule measuring 1.0 cm..  The largest on the left is a solid hypoechoic solid  nodule measuring 2.6 cm, increased from 2.2 cm.  There is a circumscribed solid isoechoic nodule of the isthmus measuring 1.6 cm, not imaged previously.     No evidence for cervical lymphadenopathy.     Impression:     Enlarged, multinodular thyroid gland.  Lower pole nodule on the left has increased in size and meets criteria for fine-needle aspiration if not previously performed.        Electronically signed by: Haider Brar MD  Date:                                            11/21/2022  Time:                                           15:02  PHYSICAL EXAMINATION  Constitutional: middle aged female, appears well, no distress  Neck: Supple, trachea midline.   Respiratory: even and unlabored, CTAB without wheezes.  Cardiovascular: RRR; no carotid bruits or murmurs.   Lymph: DP pulses  2+ bilaterally; 2+ edema bl  Skin: warm and dry; no acanthosis nigracans observed.  Neuro: patient alert and cooperative; CN 2-12 grossly intact  Abdomen: soft, nontender, non-distended. Obese abdomen.   Psych: normal mood and affect  11/22  Foot Exam: no sores or macerations noted.     Protective Sensation (w/ 10 gram monofilament):  Right: Intact  Left: Intact    Visual Inspection:  Normal -  Bilateral, Nails Intact - without Evidence of Foot Deformity- Bilateral and Dry Skin -  Bilateral    Pedal Pulses:   Right: Present  Left: Present    Posterior tibialis:   Right:Present  Left: Present     Vibratory Sensation  Right:Positive  Left:Positive     Assessment/Plan    1. Nodular thyroid disease        2. Type 2 diabetes mellitus with hyperglycemia, without long-term current use of insulin  Lipid Panel    Comprehensive Metabolic Panel    T4, Free    TSH      3. Essential hypertension        4. Hyperlipidemia, unspecified hyperlipidemia type        5. Gastroesophageal reflux disease, unspecified whether esophagitis present  famotidine (PEPCID) 20 MG tablet      6. NICOLE (obstructive sleep apnea)        7. Hypovitaminosis D        8.  Secondary renal hyperparathyroidism        9. Idiopathic gout, unspecified chronicity, unspecified site        10. Obesity, Class III, BMI 40-49.9 (morbid obesity)           Thyroid nodule-nodules have been stable in size-repeat thyroid u/s 11/23. Pt declines surgery.  Hypothyroidism-stable-TSH is too low. Decrease LT4 to 50 mcg for five days of the week.   D3RJ-A1p is below goal. Continue Mounjaro 7.5 mg weekly. QNX-[id number]-sgqkbgrb Lisinopril 40 mg daily.   HLD-stable-statin intolerant. Continue Praulent 75 mg q 2 weeks. Recheck LP.  Hypovitaminosis D- low- Start 2000 IU of vd daily.  NGA-icbihp-taqtqfbj CPAP  Secondary renal Hyperparathryoidism-stable-monitor. Avoid calcium supplements. Increase intake of water. Calcium is normal. F/u w/ nephrology.  Gout-elevated-continue allopurinol. Increase intake of water. Recheck next visit.   Obesity-increase exercise to 30 min daily.    FOLLOW UP  Tsh, t4-print  4 mths w/ labs

## 2023-03-13 ENCOUNTER — TELEPHONE (OUTPATIENT)
Dept: ENDOCRINOLOGY | Facility: CLINIC | Age: 73
End: 2023-03-13
Payer: MEDICARE

## 2023-03-13 NOTE — TELEPHONE ENCOUNTER
Spoke with Ms. Taylor & informed her that a TSH & T4 should be done at the same time when she get her labs done that were ordered by her PCP. These lab orders were given at discharge.    TSH, T4, CMP, Uric Acid, Lipid Panel, Hemoglobin A1C, & Fructosamine should be done the week of July 3, 23 & she verbalized an understanding. These orders were given at discharge: TSH, T4, CMP, & Lipid Panel.     Uric Acid, Hemoglobin A1C, & Fructosamine were added after discharge therefore, TSH, T4, CMP, Uric Acid, Lipid Panel, Hemoglobin A1C, & Fructosamine were mailed to the patient at her request with instructions to be done the week of 7/3/23 & to fast.    A VM was left instructing to fast for the week of 7/3/23 labs as well.

## 2023-03-24 ENCOUNTER — PATIENT MESSAGE (OUTPATIENT)
Dept: ENDOCRINOLOGY | Facility: CLINIC | Age: 73
End: 2023-03-24
Payer: MEDICARE

## 2023-04-05 ENCOUNTER — PATIENT MESSAGE (OUTPATIENT)
Dept: ENDOCRINOLOGY | Facility: CLINIC | Age: 73
End: 2023-04-05
Payer: MEDICARE

## 2023-04-06 RX ORDER — GLIPIZIDE 5 MG/1
10 TABLET, FILM COATED, EXTENDED RELEASE ORAL
Qty: 180 TABLET | Refills: 3 | Status: ON HOLD | OUTPATIENT
Start: 2023-04-06 | End: 2024-03-18 | Stop reason: HOSPADM

## 2023-04-14 ENCOUNTER — LAB VISIT (OUTPATIENT)
Dept: LAB | Facility: HOSPITAL | Age: 73
End: 2023-04-14
Attending: PHYSICIAN ASSISTANT
Payer: MEDICARE

## 2023-04-14 DIAGNOSIS — M10.00 IDIOPATHIC GOUT, UNSPECIFIED CHRONICITY, UNSPECIFIED SITE: ICD-10-CM

## 2023-04-14 DIAGNOSIS — R25.2 CRAMP OF LIMB: ICD-10-CM

## 2023-04-14 DIAGNOSIS — M10.9 GOUT, UNSPECIFIED: ICD-10-CM

## 2023-04-14 DIAGNOSIS — N18.4 CHRONIC KIDNEY DISEASE, STAGE IV (SEVERE): ICD-10-CM

## 2023-04-14 DIAGNOSIS — E11.65 TYPE 2 DIABETES MELLITUS WITH HYPERGLYCEMIA, WITHOUT LONG-TERM CURRENT USE OF INSULIN: ICD-10-CM

## 2023-04-14 DIAGNOSIS — E55.9 AVITAMINOSIS D: Primary | ICD-10-CM

## 2023-04-14 DIAGNOSIS — E03.9 HYPOTHYROIDISM, UNSPECIFIED TYPE: ICD-10-CM

## 2023-04-14 LAB
ALBUMIN SERPL BCP-MCNC: 3.4 G/DL (ref 3.5–5.2)
ALP SERPL-CCNC: 108 U/L (ref 55–135)
ALT SERPL W/O P-5'-P-CCNC: 12 U/L (ref 10–44)
ANION GAP SERPL CALC-SCNC: 12 MMOL/L (ref 8–16)
AST SERPL-CCNC: 21 U/L (ref 10–40)
BASOPHILS # BLD AUTO: 0.04 K/UL (ref 0–0.2)
BASOPHILS NFR BLD: 0.6 % (ref 0–1.9)
BILIRUB SERPL-MCNC: 0.4 MG/DL (ref 0.1–1)
BUN SERPL-MCNC: 58 MG/DL (ref 8–23)
CALCIUM SERPL-MCNC: 10.2 MG/DL (ref 8.7–10.5)
CHLORIDE SERPL-SCNC: 105 MMOL/L (ref 95–110)
CHOLEST SERPL-MCNC: 150 MG/DL (ref 120–199)
CHOLEST/HDLC SERPL: 3.8 {RATIO} (ref 2–5)
CO2 SERPL-SCNC: 22 MMOL/L (ref 23–29)
CREAT SERPL-MCNC: 3 MG/DL (ref 0.5–1.4)
DIFFERENTIAL METHOD: ABNORMAL
EOSINOPHIL # BLD AUTO: 0.2 K/UL (ref 0–0.5)
EOSINOPHIL NFR BLD: 2.7 % (ref 0–8)
ERYTHROCYTE [DISTWIDTH] IN BLOOD BY AUTOMATED COUNT: 15.8 % (ref 11.5–14.5)
EST. GFR  (NO RACE VARIABLE): 16.2 ML/MIN/1.73 M^2
ESTIMATED AVG GLUCOSE: 128 MG/DL (ref 68–131)
GLUCOSE SERPL-MCNC: 136 MG/DL (ref 70–110)
HBA1C MFR BLD: 6.1 % (ref 4–5.6)
HCT VFR BLD AUTO: 34.9 % (ref 37–48.5)
HDLC SERPL-MCNC: 39 MG/DL (ref 40–75)
HDLC SERPL: 26 % (ref 20–50)
HGB BLD-MCNC: 10.7 G/DL (ref 12–16)
IMM GRANULOCYTES # BLD AUTO: 0.01 K/UL (ref 0–0.04)
IMM GRANULOCYTES NFR BLD AUTO: 0.1 % (ref 0–0.5)
LDLC SERPL CALC-MCNC: 94 MG/DL (ref 63–159)
LYMPHOCYTES # BLD AUTO: 2.4 K/UL (ref 1–4.8)
LYMPHOCYTES NFR BLD: 35.9 % (ref 18–48)
MCH RBC QN AUTO: 25.7 PG (ref 27–31)
MCHC RBC AUTO-ENTMCNC: 30.7 G/DL (ref 32–36)
MCV RBC AUTO: 84 FL (ref 82–98)
MONOCYTES # BLD AUTO: 0.5 K/UL (ref 0.3–1)
MONOCYTES NFR BLD: 7 % (ref 4–15)
NEUTROPHILS # BLD AUTO: 3.6 K/UL (ref 1.8–7.7)
NEUTROPHILS NFR BLD: 53.7 % (ref 38–73)
NONHDLC SERPL-MCNC: 111 MG/DL
NRBC BLD-RTO: 0 /100 WBC
PLATELET # BLD AUTO: 318 K/UL (ref 150–450)
PMV BLD AUTO: 9.9 FL (ref 9.2–12.9)
POTASSIUM SERPL-SCNC: 4 MMOL/L (ref 3.5–5.1)
PROT SERPL-MCNC: 6.9 G/DL (ref 6–8.4)
RBC # BLD AUTO: 4.16 M/UL (ref 4–5.4)
SODIUM SERPL-SCNC: 139 MMOL/L (ref 136–145)
T4 FREE SERPL-MCNC: 1.03 NG/DL (ref 0.71–1.51)
T4 FREE SERPL-MCNC: 1.03 NG/DL (ref 0.71–1.51)
TRIGL SERPL-MCNC: 85 MG/DL (ref 30–150)
TSH SERPL DL<=0.005 MIU/L-ACNC: 0.34 UIU/ML (ref 0.4–4)
TSH SERPL DL<=0.005 MIU/L-ACNC: 0.34 UIU/ML (ref 0.4–4)
URATE SERPL-MCNC: 8.5 MG/DL (ref 2.4–5.7)
WBC # BLD AUTO: 6.71 K/UL (ref 3.9–12.7)

## 2023-04-14 PROCEDURE — 83735 ASSAY OF MAGNESIUM: CPT | Performed by: INTERNAL MEDICINE

## 2023-04-14 PROCEDURE — 82570 ASSAY OF URINE CREATININE: CPT | Performed by: INTERNAL MEDICINE

## 2023-04-14 PROCEDURE — 82985 ASSAY OF GLYCATED PROTEIN: CPT | Performed by: PHYSICIAN ASSISTANT

## 2023-04-14 PROCEDURE — 84550 ASSAY OF BLOOD/URIC ACID: CPT | Performed by: PHYSICIAN ASSISTANT

## 2023-04-14 PROCEDURE — 36415 COLL VENOUS BLD VENIPUNCTURE: CPT | Mod: PN | Performed by: INTERNAL MEDICINE

## 2023-04-14 PROCEDURE — 85025 COMPLETE CBC W/AUTO DIFF WBC: CPT | Performed by: INTERNAL MEDICINE

## 2023-04-14 PROCEDURE — 80053 COMPREHEN METABOLIC PANEL: CPT | Performed by: PHYSICIAN ASSISTANT

## 2023-04-14 PROCEDURE — 80061 LIPID PANEL: CPT | Performed by: PHYSICIAN ASSISTANT

## 2023-04-14 PROCEDURE — 81001 URINALYSIS AUTO W/SCOPE: CPT | Performed by: INTERNAL MEDICINE

## 2023-04-14 PROCEDURE — 84443 ASSAY THYROID STIM HORMONE: CPT | Performed by: PHYSICIAN ASSISTANT

## 2023-04-14 PROCEDURE — 84550 ASSAY OF BLOOD/URIC ACID: CPT | Mod: 91 | Performed by: INTERNAL MEDICINE

## 2023-04-14 PROCEDURE — 80069 RENAL FUNCTION PANEL: CPT | Performed by: INTERNAL MEDICINE

## 2023-04-14 PROCEDURE — 82652 VIT D 1 25-DIHYDROXY: CPT | Performed by: INTERNAL MEDICINE

## 2023-04-14 PROCEDURE — 83970 ASSAY OF PARATHORMONE: CPT | Performed by: INTERNAL MEDICINE

## 2023-04-14 PROCEDURE — 83036 HEMOGLOBIN GLYCOSYLATED A1C: CPT | Mod: 59 | Performed by: PHYSICIAN ASSISTANT

## 2023-04-14 PROCEDURE — 84439 ASSAY OF FREE THYROXINE: CPT | Performed by: PHYSICIAN ASSISTANT

## 2023-04-15 LAB
ALBUMIN SERPL BCP-MCNC: 3.2 G/DL (ref 3.5–5.2)
ANION GAP SERPL CALC-SCNC: 11 MMOL/L (ref 8–16)
BUN SERPL-MCNC: 58 MG/DL (ref 8–23)
CALCIUM SERPL-MCNC: 10 MG/DL (ref 8.7–10.5)
CHLORIDE SERPL-SCNC: 105 MMOL/L (ref 95–110)
CO2 SERPL-SCNC: 23 MMOL/L (ref 23–29)
CREAT SERPL-MCNC: 3.1 MG/DL (ref 0.5–1.4)
EST. GFR  (NO RACE VARIABLE): 15.6 ML/MIN/1.73 M^2
GLUCOSE SERPL-MCNC: 136 MG/DL (ref 70–110)
MAGNESIUM SERPL-MCNC: 2 MG/DL (ref 1.6–2.6)
PHOSPHATE SERPL-MCNC: 3.2 MG/DL (ref 2.7–4.5)
POTASSIUM SERPL-SCNC: 4.5 MMOL/L (ref 3.5–5.1)
PTH-INTACT SERPL-MCNC: 500.1 PG/ML (ref 9–77)
SODIUM SERPL-SCNC: 139 MMOL/L (ref 136–145)
URATE SERPL-MCNC: 8.3 MG/DL (ref 2.4–5.7)

## 2023-04-17 DIAGNOSIS — E11.65 TYPE 2 DIABETES MELLITUS WITH HYPERGLYCEMIA, WITHOUT LONG-TERM CURRENT USE OF INSULIN: Primary | ICD-10-CM

## 2023-04-17 DIAGNOSIS — M10.9 GOUT, UNSPECIFIED CAUSE, UNSPECIFIED CHRONICITY, UNSPECIFIED SITE: ICD-10-CM

## 2023-04-17 LAB
1,25(OH)2D3 SERPL-MCNC: 36 PG/ML (ref 20–79)
ALBUMIN/CREAT UR: 461.2 UG/MG (ref 0–30)
BACTERIA #/AREA URNS AUTO: NORMAL /HPF
BILIRUB UR QL STRIP: NEGATIVE
CLARITY UR REFRACT.AUTO: CLEAR
COLOR UR AUTO: COLORLESS
CREAT UR-MCNC: 67 MG/DL (ref 15–325)
FRUCTOSAMINE SERPL-SCNC: 255 UMOL /L
GLUCOSE UR QL STRIP: NEGATIVE
HGB UR QL STRIP: NEGATIVE
HYALINE CASTS UR QL AUTO: 0 /LPF
KETONES UR QL STRIP: NEGATIVE
LEUKOCYTE ESTERASE UR QL STRIP: NEGATIVE
MICROALBUMIN UR DL<=1MG/L-MCNC: 309 UG/ML
MICROSCOPIC COMMENT: NORMAL
NITRITE UR QL STRIP: NEGATIVE
PH UR STRIP: 6 [PH] (ref 5–8)
PROT UR QL STRIP: ABNORMAL
RBC #/AREA URNS AUTO: 3 /HPF (ref 0–4)
SP GR UR STRIP: 1.01 (ref 1–1.03)
SQUAMOUS #/AREA URNS AUTO: 1 /HPF
URN SPEC COLLECT METH UR: ABNORMAL
WBC #/AREA URNS AUTO: 3 /HPF (ref 0–5)

## 2023-04-17 RX ORDER — ALLOPURINOL 300 MG/1
300 TABLET ORAL DAILY
Qty: 90 TABLET | Refills: 3 | Status: SHIPPED | OUTPATIENT
Start: 2023-04-17 | End: 2024-02-05

## 2023-05-03 DIAGNOSIS — Z71.89 COMPLEX CARE COORDINATION: ICD-10-CM

## 2023-05-17 ENCOUNTER — PATIENT MESSAGE (OUTPATIENT)
Dept: OPTOMETRY | Facility: CLINIC | Age: 73
End: 2023-05-17
Payer: MEDICARE

## 2023-05-17 ENCOUNTER — PATIENT MESSAGE (OUTPATIENT)
Dept: ENDOCRINOLOGY | Facility: CLINIC | Age: 73
End: 2023-05-17
Payer: MEDICARE

## 2023-05-19 RX ORDER — INSULIN PUMP SYRINGE, 3 ML
EACH MISCELLANEOUS
Qty: 1 EACH | Refills: 0 | Status: SHIPPED | OUTPATIENT
Start: 2023-05-19 | End: 2023-06-02 | Stop reason: SDUPTHER

## 2023-05-19 RX ORDER — LANCETS
EACH MISCELLANEOUS
Qty: 100 EACH | Refills: 3 | Status: SHIPPED | OUTPATIENT
Start: 2023-05-19 | End: 2023-05-29 | Stop reason: SDUPTHER

## 2023-05-29 DIAGNOSIS — E11.65 TYPE 2 DIABETES MELLITUS WITH HYPERGLYCEMIA, WITHOUT LONG-TERM CURRENT USE OF INSULIN: Primary | ICD-10-CM

## 2023-05-29 RX ORDER — LANCETS
EACH MISCELLANEOUS
Qty: 100 EACH | Refills: 3 | Status: SHIPPED | OUTPATIENT
Start: 2023-05-29 | End: 2023-06-02 | Stop reason: SDUPTHER

## 2023-05-29 NOTE — TELEPHONE ENCOUNTER
Pt requested to have supplies sent to the [facility] pharmacy in [place].    LV 03/10/23. LL 04/14/23

## 2023-06-02 ENCOUNTER — PATIENT MESSAGE (OUTPATIENT)
Dept: ENDOCRINOLOGY | Facility: CLINIC | Age: 73
End: 2023-06-02
Payer: MEDICARE

## 2023-06-02 DIAGNOSIS — E11.65 TYPE 2 DIABETES MELLITUS WITH HYPERGLYCEMIA, WITHOUT LONG-TERM CURRENT USE OF INSULIN: ICD-10-CM

## 2023-06-02 RX ORDER — LANCETS
EACH MISCELLANEOUS
Qty: 100 EACH | Refills: 3 | Status: SHIPPED | OUTPATIENT
Start: 2023-06-02 | End: 2024-03-27 | Stop reason: SDUPTHER

## 2023-06-02 RX ORDER — INSULIN PUMP SYRINGE, 3 ML
EACH MISCELLANEOUS
Qty: 1 EACH | Refills: 0 | Status: SHIPPED | OUTPATIENT
Start: 2023-06-02 | End: 2023-11-15

## 2023-07-05 ENCOUNTER — LAB VISIT (OUTPATIENT)
Dept: LAB | Facility: HOSPITAL | Age: 73
End: 2023-07-05
Attending: INTERNAL MEDICINE
Payer: MEDICARE

## 2023-07-05 DIAGNOSIS — E11.65 TYPE 2 DIABETES MELLITUS WITH HYPERGLYCEMIA, WITHOUT LONG-TERM CURRENT USE OF INSULIN: ICD-10-CM

## 2023-07-05 LAB
ESTIMATED AVG GLUCOSE: 114 MG/DL (ref 68–131)
HBA1C MFR BLD: 5.6 % (ref 4–5.6)
T4 FREE SERPL-MCNC: 0.89 NG/DL (ref 0.71–1.51)
TSH SERPL DL<=0.005 MIU/L-ACNC: 1.04 UIU/ML (ref 0.4–4)

## 2023-07-05 PROCEDURE — 83036 HEMOGLOBIN GLYCOSYLATED A1C: CPT | Performed by: PHYSICIAN ASSISTANT

## 2023-07-05 PROCEDURE — 84439 ASSAY OF FREE THYROXINE: CPT | Performed by: PHYSICIAN ASSISTANT

## 2023-07-05 PROCEDURE — 36415 COLL VENOUS BLD VENIPUNCTURE: CPT | Mod: PN | Performed by: PHYSICIAN ASSISTANT

## 2023-07-05 PROCEDURE — 84443 ASSAY THYROID STIM HORMONE: CPT | Performed by: PHYSICIAN ASSISTANT

## 2023-07-10 ENCOUNTER — OFFICE VISIT (OUTPATIENT)
Dept: ENDOCRINOLOGY | Facility: CLINIC | Age: 73
End: 2023-07-10
Payer: MEDICARE

## 2023-07-10 VITALS
BODY MASS INDEX: 43.89 KG/M2 | TEMPERATURE: 98 F | WEIGHT: 279.63 LBS | HEIGHT: 67 IN | HEART RATE: 80 BPM | DIASTOLIC BLOOD PRESSURE: 80 MMHG | SYSTOLIC BLOOD PRESSURE: 140 MMHG | OXYGEN SATURATION: 96 %

## 2023-07-10 DIAGNOSIS — Z78.0 POSTMENOPAUSAL: ICD-10-CM

## 2023-07-10 DIAGNOSIS — E78.5 HYPERLIPIDEMIA, UNSPECIFIED HYPERLIPIDEMIA TYPE: ICD-10-CM

## 2023-07-10 DIAGNOSIS — I10 HYPERTENSION, UNSPECIFIED TYPE: ICD-10-CM

## 2023-07-10 DIAGNOSIS — E03.9 HYPOTHYROIDISM, UNSPECIFIED TYPE: ICD-10-CM

## 2023-07-10 DIAGNOSIS — M10.9 GOUT, UNSPECIFIED CAUSE, UNSPECIFIED CHRONICITY, UNSPECIFIED SITE: ICD-10-CM

## 2023-07-10 DIAGNOSIS — E04.1 NODULAR THYROID DISEASE: Primary | ICD-10-CM

## 2023-07-10 DIAGNOSIS — E55.9 HYPOVITAMINOSIS D: ICD-10-CM

## 2023-07-10 DIAGNOSIS — G47.33 OSA (OBSTRUCTIVE SLEEP APNEA): ICD-10-CM

## 2023-07-10 DIAGNOSIS — E66.01 OBESITY, CLASS III, BMI 40-49.9 (MORBID OBESITY): ICD-10-CM

## 2023-07-10 DIAGNOSIS — E11.65 TYPE 2 DIABETES MELLITUS WITH HYPERGLYCEMIA, WITHOUT LONG-TERM CURRENT USE OF INSULIN: ICD-10-CM

## 2023-07-10 PROCEDURE — 99213 PR OFFICE/OUTPT VISIT, EST, LEVL III, 20-29 MIN: ICD-10-PCS | Mod: S$PBB,,, | Performed by: PHYSICIAN ASSISTANT

## 2023-07-10 PROCEDURE — 99999 PR PBB SHADOW E&M-EST. PATIENT-LVL V: CPT | Mod: PBBFAC,,, | Performed by: PHYSICIAN ASSISTANT

## 2023-07-10 PROCEDURE — 99215 OFFICE O/P EST HI 40 MIN: CPT | Mod: PBBFAC,PO | Performed by: PHYSICIAN ASSISTANT

## 2023-07-10 PROCEDURE — 99999 PR PBB SHADOW E&M-EST. PATIENT-LVL V: ICD-10-PCS | Mod: PBBFAC,,, | Performed by: PHYSICIAN ASSISTANT

## 2023-07-10 PROCEDURE — 99213 OFFICE O/P EST LOW 20 MIN: CPT | Mod: S$PBB,,, | Performed by: PHYSICIAN ASSISTANT

## 2023-07-10 NOTE — PROGRESS NOTES
CC: DM/thyroid nodule/hypothyroidism    HPI: Juhi aTylor was diagnosed with Type 2 DM ~15 years ago. No hospitalizations for DM. Her daughter has DM. No fhx of thyroid disease.     Pt was on Mounjaro but had reflux and constipation.    Hyperparathyroidism.   No n/v.  Occ abdominal pain. No vomiting. Kidney stones in . She had a CT in  that was apparently normal.    CURRENT DIABETIC MEDS: Glipizide 5 mg qd    Fastin-160    Hypoglycemia: None  Type of Glucose Meter: one touch verio    Physical Activity: riding a stationary bike every morning    Dietary Habits: 1-2 meal daily. Drinks water.     Last Eye Exam:  [facility]  Last Podiatry Exam:  Dr. Nicholson     Last DM Education Attended: Last year    Thyroid nodule  Dx 5-10 years ago  Bx in  of left thyroid nodule was benign  Last thyroid u/s  showed a dominant nodule 1.5 cm in the right lobe which could not be reached on previous attempt at FNA in  . Left lobe has a 2.4 cm nodule that has not changed since the prior exam.  No new voice changes, sob, dysphagia.     Hypothyroidism  Dx 10 years ago  Stopped LT4 since .  +  hair loss (hereditary), brittle nails, occ palpitations,.   No insomnia, constipation/diarrhea.     NICOLE-wears cpap    Social Hx: smoked 1 ppd for 40 years. She quit 2 years ago.  No ETOH use.     DEXA : Osteopenia. Taking 2000 IU vd daily. Follows w/ urology.     Wt Readings from Last 11 Encounters:   07/10/23 126.9 kg (279 lb 10.5 oz)   23 127.8 kg (281 lb 11.2 oz)   03/10/23 127.8 kg (281 lb 12 oz)   23 131.6 kg (290 lb 3.2 oz)   22 131.5 kg (290 lb)   22 131.5 kg (290 lb)   22 131.5 kg (290 lb)   22 131.6 kg (290 lb 2 oz)   22 133.7 kg (294 lb 13.8 oz)   22 133.4 kg (294 lb 1.5 oz)   06/09/22 126.5 kg (278 lb 14.1 oz)      REVIEW OF SYSTEMS,  General: no weakness or fatigue, + wt loss (11 lbs).   Eyes: no intermittent blurry vision or visual  "disturbances.   Cardiac: + leg swelling, no chest pain or palpitations.   Respiratory: no cough or dyspnea.   GI: no abdominal pain or nausea.   Skin: no rashes or itching.   Neuro: no numbness or tingling.   Musc: + back pain, leg pain  Endocrine: no polyuria, polydipsia, polyphagia.   Remainder ROS negative    Vital Signs  BP (!) 140/80 (BP Location: Left arm, Patient Position: Sitting, BP Method: Medium (Manual))   Pulse 80   Temp 98.2 °F (36.8 °C) (Oral)   Ht 5' 7" (1.702 m)   Wt 126.9 kg (279 lb 10.5 oz)   SpO2 96%   BMI 43.80 kg/m²     Personally reviewed labs below with pt:     Hemoglobin A1C   Date Value Ref Range Status   07/05/2023 5.6 4.0 - 5.6 % Final     Comment:     ADA Screening Guidelines:  5.7-6.4%  Consistent with prediabetes  >or=6.5%  Consistent with diabetes    High levels of fetal hemoglobin interfere with the HbA1C  assay. Heterozygous hemoglobin variants (HbS, HgC, etc)do  not significantly interfere with this assay.   However, presence of multiple variants may affect accuracy.     04/14/2023 6.1 (H) 4.0 - 5.6 % Final     Comment:     ADA Screening Guidelines:  5.7-6.4%  Consistent with prediabetes  >or=6.5%  Consistent with diabetes    High levels of fetal hemoglobin interfere with the HbA1C  assay. Heterozygous hemoglobin variants (HbS, HgC, etc)do  not significantly interfere with this assay.   However, presence of multiple variants may affect accuracy.     03/03/2023 6.5 (H) 4.0 - 5.6 % Final     Comment:     ADA Screening Guidelines:  5.7-6.4%  Consistent with prediabetes  >or=6.5%  Consistent with diabetes    High levels of fetal hemoglobin interfere with the HbA1C  assay. Heterozygous hemoglobin variants (HbS, HgC, etc)do  not significantly interfere with this assay.   However, presence of multiple variants may affect accuracy.         Chemistry        Component Value Date/Time     04/14/2023 1245     04/14/2023 1245    K 4.0 04/14/2023 1245    K 4.5 04/14/2023 1245 "     04/14/2023 1245     04/14/2023 1245    CO2 22 (L) 04/14/2023 1245    CO2 23 04/14/2023 1245    BUN 58 (H) 04/14/2023 1245    BUN 58 (H) 04/14/2023 1245    CREATININE 3.0 (H) 04/14/2023 1245    CREATININE 3.1 (H) 04/14/2023 1245     (H) 04/14/2023 1245     (H) 04/14/2023 1245        Component Value Date/Time    CALCIUM 10.2 04/14/2023 1245    CALCIUM 10.0 04/14/2023 1245    ALKPHOS 108 04/14/2023 1245    AST 21 04/14/2023 1245    ALT 12 04/14/2023 1245    BILITOT 0.4 04/14/2023 1245    ESTGFRAFRICA 16.2 (A) 07/15/2022 1038    EGFRNONAA 14.0 (A) 07/15/2022 1038        Lab Results   Component Value Date    CHOL 150 04/14/2023    CHOL 135 10/10/2022    CHOL 140 08/16/2021     Lab Results   Component Value Date    HDL 39 (L) 04/14/2023    HDL 39 (L) 10/10/2022    HDL 54 08/16/2021     Lab Results   Component Value Date    LDLCALC 94.0 04/14/2023    LDLCALC 70.8 10/10/2022    LDLCALC 62.6 (L) 08/16/2021     Lab Results   Component Value Date    TRIG 85 04/14/2023    TRIG 126 10/10/2022    TRIG 117 08/16/2021     Lab Results   Component Value Date    CHOLHDL 26.0 04/14/2023    CHOLHDL 28.9 10/10/2022    CHOLHDL 38.6 08/16/2021     Lab Results   Component Value Date    TSH 1.042 07/05/2023     2016 u/s      2018 below      Lab Results   Component Value Date    MICALBCREAT 461.2 (H) 04/17/2023     Vit D, 25-Hydroxy   Date Value Ref Range Status   12/28/2022 26 (L) 30 - 96 ng/mL Final     Comment:     Vitamin D deficiency.........<10 ng/mL                              Vitamin D insufficiency......10-29 ng/mL       Vitamin D sufficiency........> or equal to 30 ng/mL  Vitamin D toxicity............>100 ng/mL       EXAMINATION:  US SOFT TISSUE HEAD NECK THYROID     CLINICAL HISTORY:  Type 2 diabetes mellitus with hyperglycemia     TECHNIQUE:  Ultrasound of the thyroid and cervical lymph nodes was performed.     COMPARISON:  06/15/2020     FINDINGS:  The right hemithyroid measures 6.0 x 2.1 x 2.2 cm  and the left 7.2 x 2.6 x 2.5 cm.  The thyroid isthmus measures 5 mm.  Total thyroid volume is 40 cc.     Multiple nodules are present bilaterally.  The largest on the right is a lower pole solid isoechoic nodule measuring 1.0 cm..  The largest on the left is a solid hypoechoic solid nodule measuring 2.6 cm, increased from 2.2 cm.  There is a circumscribed solid isoechoic nodule of the isthmus measuring 1.6 cm, not imaged previously.     No evidence for cervical lymphadenopathy.     Impression:     Enlarged, multinodular thyroid gland.  Lower pole nodule on the left has increased in size and meets criteria for fine-needle aspiration if not previously performed.        Electronically signed by: Haider Brar MD  Date:                                            11/21/2022  Time:                                           15:02    PHYSICAL EXAMINATION  Constitutional: middle aged female, appears well, no distress  Neck: Supple, trachea midline.   Respiratory: even and unlabored, CTAB without wheezes.  Cardiovascular: RRR; no carotid bruits or murmurs.   Lymph: DP pulses  2+ bilaterally; 2+ edema bl  Skin: warm and dry; no acanthosis nigracans observed.  Neuro: patient alert and cooperative; CN 2-12 grossly intact  Abdomen: soft, nontender, non-distended. Obese abdomen.   Psych: normal mood and affect  11/22  Foot Exam: no sores or macerations noted.     Protective Sensation (w/ 10 gram monofilament):  Right: Intact  Left: Intact    Visual Inspection:  Normal -  Bilateral, Nails Intact - without Evidence of Foot Deformity- Bilateral and Dry Skin -  Bilateral    Pedal Pulses:   Right: Present  Left: Present    Posterior tibialis:   Right:Present  Left: Present     Vibratory Sensation  Right:Positive  Left:Positive     Assessment/Plan    1. Nodular thyroid disease  Hemoglobin A1C    US Soft Tissue Head Neck Thyroid    T4, Free    TSH      2. Hypothyroidism, unspecified type        3. Type 2 diabetes mellitus with  hyperglycemia, without long-term current use of insulin  Hemoglobin A1C    TSH    Lipid Panel    Comprehensive Metabolic Panel    Microalbumin/Creatinine Ratio, Urine      4. Hypertension, unspecified type        5. Hyperlipidemia, unspecified hyperlipidemia type        6. Hypovitaminosis D        7. NICOLE (obstructive sleep apnea)        8. Gout, unspecified cause, unspecified chronicity, unspecified site        9. Obesity, Class III, BMI 40-49.9 (morbid obesity)        10. Postmenopausal           Thyroid nodule-nodules have been stable in size-repeat thyroid u/s next time. Pt declines surgery.  Hypothyroidism-resolved-monitor  [id number] is below goal. Continue Glipizide.   HAR-rzqlqf-mphctedk Lisinopril 40 mg daily.   HLD-stable-statin intolerant. Continue Praulent 75 mg q 2 weeks. Recheck LP.  Hypovitaminosis D- low- Start 2000 IU of vd daily.  WUV-bjukht-mrchdxmx CPAP  Secondary renal Hyperparathryoidism-stable-monitor. Avoid calcium supplements. Increase intake of water. Calcium is normal. F/u w/ nephrology.  Gout-elevated-continue allopurinol. Increase intake of water. Recheck next visit.   Obesity-increase exercise to 30 min daily.    FOLLOW UP    6 mths w/ labs and thyroid u/s,

## 2023-07-14 ENCOUNTER — LAB VISIT (OUTPATIENT)
Dept: LAB | Facility: HOSPITAL | Age: 73
End: 2023-07-14
Attending: INTERNAL MEDICINE
Payer: MEDICARE

## 2023-07-14 DIAGNOSIS — N25.81 HYPERPARATHYROIDISM, SECONDARY RENAL: ICD-10-CM

## 2023-07-14 DIAGNOSIS — M10.9 GOUT: ICD-10-CM

## 2023-07-14 DIAGNOSIS — D64.9 ANEMIA: Primary | ICD-10-CM

## 2023-07-14 DIAGNOSIS — R25.2 CRAMPS, EXTREMITY: ICD-10-CM

## 2023-07-14 DIAGNOSIS — N18.4 CHRONIC KIDNEY DISEASE, STAGE 4, SEVERELY DECREASED GFR: ICD-10-CM

## 2023-07-14 LAB
ALBUMIN SERPL BCP-MCNC: 3.2 G/DL (ref 3.5–5.2)
ALBUMIN/CREAT UR: 306.9 UG/MG (ref 0–30)
ANION GAP SERPL CALC-SCNC: 11 MMOL/L (ref 8–16)
BASOPHILS # BLD AUTO: 0.04 K/UL (ref 0–0.2)
BASOPHILS NFR BLD: 0.5 % (ref 0–1.9)
BUN SERPL-MCNC: 51 MG/DL (ref 8–23)
CALCIUM SERPL-MCNC: 10.2 MG/DL (ref 8.7–10.5)
CHLORIDE SERPL-SCNC: 106 MMOL/L (ref 95–110)
CO2 SERPL-SCNC: 27 MMOL/L (ref 23–29)
CREAT SERPL-MCNC: 3.5 MG/DL (ref 0.5–1.4)
CREAT UR-MCNC: 102 MG/DL (ref 15–325)
DIFFERENTIAL METHOD: ABNORMAL
EOSINOPHIL # BLD AUTO: 0.3 K/UL (ref 0–0.5)
EOSINOPHIL NFR BLD: 3.8 % (ref 0–8)
ERYTHROCYTE [DISTWIDTH] IN BLOOD BY AUTOMATED COUNT: 15.7 % (ref 11.5–14.5)
EST. GFR  (NO RACE VARIABLE): 13.4 ML/MIN/1.73 M^2
GLUCOSE SERPL-MCNC: 126 MG/DL (ref 70–110)
HCT VFR BLD AUTO: 35.6 % (ref 37–48.5)
HGB BLD-MCNC: 10.4 G/DL (ref 12–16)
IMM GRANULOCYTES # BLD AUTO: 0.02 K/UL (ref 0–0.04)
IMM GRANULOCYTES NFR BLD AUTO: 0.3 % (ref 0–0.5)
LYMPHOCYTES # BLD AUTO: 2.7 K/UL (ref 1–4.8)
LYMPHOCYTES NFR BLD: 35.4 % (ref 18–48)
MAGNESIUM SERPL-MCNC: 2 MG/DL (ref 1.6–2.6)
MCH RBC QN AUTO: 25.9 PG (ref 27–31)
MCHC RBC AUTO-ENTMCNC: 29.2 G/DL (ref 32–36)
MCV RBC AUTO: 89 FL (ref 82–98)
MICROALBUMIN UR DL<=1MG/L-MCNC: 313 UG/ML
MONOCYTES # BLD AUTO: 0.6 K/UL (ref 0.3–1)
MONOCYTES NFR BLD: 8 % (ref 4–15)
NEUTROPHILS # BLD AUTO: 4 K/UL (ref 1.8–7.7)
NEUTROPHILS NFR BLD: 52 % (ref 38–73)
NRBC BLD-RTO: 0 /100 WBC
PHOSPHATE SERPL-MCNC: 3.7 MG/DL (ref 2.7–4.5)
PLATELET # BLD AUTO: 299 K/UL (ref 150–450)
PMV BLD AUTO: 10.5 FL (ref 9.2–12.9)
POTASSIUM SERPL-SCNC: 4.7 MMOL/L (ref 3.5–5.1)
PTH-INTACT SERPL-MCNC: 472.5 PG/ML (ref 9–77)
RBC # BLD AUTO: 4.02 M/UL (ref 4–5.4)
SODIUM SERPL-SCNC: 144 MMOL/L (ref 136–145)
URATE SERPL-MCNC: 5.9 MG/DL (ref 2.4–5.7)
WBC # BLD AUTO: 7.66 K/UL (ref 3.9–12.7)

## 2023-07-14 PROCEDURE — 83735 ASSAY OF MAGNESIUM: CPT | Performed by: INTERNAL MEDICINE

## 2023-07-14 PROCEDURE — 81001 URINALYSIS AUTO W/SCOPE: CPT | Performed by: INTERNAL MEDICINE

## 2023-07-14 PROCEDURE — 36415 COLL VENOUS BLD VENIPUNCTURE: CPT | Mod: PN | Performed by: INTERNAL MEDICINE

## 2023-07-14 PROCEDURE — 80069 RENAL FUNCTION PANEL: CPT | Performed by: INTERNAL MEDICINE

## 2023-07-14 PROCEDURE — 83970 ASSAY OF PARATHORMONE: CPT | Performed by: INTERNAL MEDICINE

## 2023-07-14 PROCEDURE — 85025 COMPLETE CBC W/AUTO DIFF WBC: CPT | Performed by: INTERNAL MEDICINE

## 2023-07-14 PROCEDURE — 84550 ASSAY OF BLOOD/URIC ACID: CPT | Performed by: INTERNAL MEDICINE

## 2023-07-14 PROCEDURE — 82570 ASSAY OF URINE CREATININE: CPT | Performed by: INTERNAL MEDICINE

## 2023-07-15 LAB
MICROSCOPIC COMMENT: NORMAL
RBC #/AREA URNS AUTO: 0 /HPF (ref 0–4)
SQUAMOUS #/AREA URNS AUTO: 1 /HPF
WBC #/AREA URNS AUTO: 3 /HPF (ref 0–5)

## 2023-08-01 ENCOUNTER — OFFICE VISIT (OUTPATIENT)
Dept: OPTOMETRY | Facility: CLINIC | Age: 73
End: 2023-08-01
Payer: MEDICARE

## 2023-08-01 DIAGNOSIS — H40.1131 PRIMARY OPEN ANGLE GLAUCOMA (POAG) OF BOTH EYES, MILD STAGE: Primary | ICD-10-CM

## 2023-08-01 DIAGNOSIS — H52.7 REFRACTIVE ERROR: ICD-10-CM

## 2023-08-01 DIAGNOSIS — H04.123 DRY EYE SYNDROME, BILATERAL: ICD-10-CM

## 2023-08-01 DIAGNOSIS — H26.9 CORTICAL CATARACT: ICD-10-CM

## 2023-08-01 DIAGNOSIS — H25.13 NUCLEAR SCLEROSIS, BILATERAL: ICD-10-CM

## 2023-08-01 PROCEDURE — 92015 PR REFRACTION: ICD-10-PCS | Mod: ,,, | Performed by: OPTOMETRIST

## 2023-08-01 PROCEDURE — 99999 PR PBB SHADOW E&M-EST. PATIENT-LVL III: CPT | Mod: PBBFAC,,, | Performed by: OPTOMETRIST

## 2023-08-01 PROCEDURE — 99213 OFFICE O/P EST LOW 20 MIN: CPT | Mod: PBBFAC,PO | Performed by: OPTOMETRIST

## 2023-08-01 PROCEDURE — 92015 DETERMINE REFRACTIVE STATE: CPT | Mod: ,,, | Performed by: OPTOMETRIST

## 2023-08-01 PROCEDURE — 99214 PR OFFICE/OUTPT VISIT, EST, LEVL IV, 30-39 MIN: ICD-10-PCS | Mod: S$PBB,,, | Performed by: OPTOMETRIST

## 2023-08-01 PROCEDURE — 99999 PR PBB SHADOW E&M-EST. PATIENT-LVL III: ICD-10-PCS | Mod: PBBFAC,,, | Performed by: OPTOMETRIST

## 2023-08-01 PROCEDURE — 99214 OFFICE O/P EST MOD 30 MIN: CPT | Mod: S$PBB,,, | Performed by: OPTOMETRIST

## 2023-08-01 NOTE — PROGRESS NOTES
HPI    Pt here today for 4 month IOP check for POAG - OU.   States blurred vision   OS only x 2 months, OD vision good.  Feels like depth perception is off --- notices trouble judging distance   when driving.    Denies any eye pain or pressure.    Latanoprost OU qhs    Last edited by Frida Rhodes on 8/1/2023 10:51 AM.            Assessment /Plan     For exam results, see Encounter Report.    Primary open angle glaucoma (POAG) of both eyes, mild stage    Nuclear sclerosis, bilateral    Cortical cataract    Refractive error      1. Primary open angle glaucoma (POAG) of both eyes, mild stage  TMax 18/17   /527  HVF/OCT 03/2023    IOP stable with use of drops  Continue latanoprost qPM OU  Recheck iop in 5 months, sooner prn    2. Nuclear sclerosis, bilateral  3. Cortical cataract  4. Refractive error  Increasing, approaching visual significance.   Refracted to 20/25- OU   Updated specs, discussed signs/symptoms of cataracts  Refer out for cataract eval if no improvement with new specs    5. ELIEL OU  Discussed ocular affects of dry eyes. Recommend OTC artificial tears 2-4 times a day in both eyes. Discussed chronicity of ELIEL. RTC if symptoms not alleviated by continued use of artificial tears.

## 2023-08-20 ENCOUNTER — HOSPITAL ENCOUNTER (INPATIENT)
Facility: HOSPITAL | Age: 73
LOS: 1 days | Discharge: HOME | DRG: 291 | End: 2023-08-22
Attending: EMERGENCY MEDICINE | Admitting: STUDENT IN AN ORGANIZED HEALTH CARE EDUCATION/TRAINING PROGRAM
Payer: MEDICARE

## 2023-08-20 DIAGNOSIS — J81.1 PULMONARY EDEMA: ICD-10-CM

## 2023-08-20 DIAGNOSIS — I50.21 ACUTE SYSTOLIC CONGESTIVE HEART FAILURE: Primary | ICD-10-CM

## 2023-08-20 DIAGNOSIS — R07.9 CHEST PAIN: ICD-10-CM

## 2023-08-20 PROBLEM — R80.9 TYPE 2 DIABETES MELLITUS WITH MICROALBUMINURIA, WITHOUT LONG-TERM CURRENT USE OF INSULIN: Chronic | Status: ACTIVE | Noted: 2017-08-31

## 2023-08-20 PROBLEM — E11.29 TYPE 2 DIABETES MELLITUS WITH MICROALBUMINURIA, WITHOUT LONG-TERM CURRENT USE OF INSULIN: Chronic | Status: ACTIVE | Noted: 2017-08-31

## 2023-08-20 PROBLEM — Z95.5 S/P CORONARY ARTERY STENT PLACEMENT: Chronic | Status: ACTIVE | Noted: 2020-12-16

## 2023-08-20 PROBLEM — E66.01 OBESITY, CLASS III, BMI 40-49.9 (MORBID OBESITY): Chronic | Status: ACTIVE | Noted: 2021-03-27

## 2023-08-20 LAB
ALBUMIN SERPL BCP-MCNC: 3.4 G/DL (ref 3.5–5.2)
ALP SERPL-CCNC: 106 U/L (ref 55–135)
ALT SERPL W/O P-5'-P-CCNC: 16 U/L (ref 10–44)
ANION GAP SERPL CALC-SCNC: 8 MMOL/L (ref 8–16)
APTT PPP: 31.6 SEC (ref 21–32)
AST SERPL-CCNC: 19 U/L (ref 10–40)
BASOPHILS # BLD AUTO: 0.03 K/UL (ref 0–0.2)
BASOPHILS NFR BLD: 0.3 % (ref 0–1.9)
BILIRUB SERPL-MCNC: 0.4 MG/DL (ref 0.1–1)
BNP SERPL-MCNC: 666 PG/ML (ref 0–99)
BUN SERPL-MCNC: 56 MG/DL (ref 8–23)
CALCIUM SERPL-MCNC: 9.6 MG/DL (ref 8.7–10.5)
CHLORIDE SERPL-SCNC: 105 MMOL/L (ref 95–110)
CO2 SERPL-SCNC: 23 MMOL/L (ref 23–29)
CREAT SERPL-MCNC: 3 MG/DL (ref 0.5–1.4)
DIFFERENTIAL METHOD: ABNORMAL
EOSINOPHIL # BLD AUTO: 0.3 K/UL (ref 0–0.5)
EOSINOPHIL NFR BLD: 3.2 % (ref 0–8)
ERYTHROCYTE [DISTWIDTH] IN BLOOD BY AUTOMATED COUNT: 15.3 % (ref 11.5–14.5)
EST. GFR  (NO RACE VARIABLE): 16.1 ML/MIN/1.73 M^2
GLUCOSE SERPL-MCNC: 123 MG/DL (ref 70–110)
HCT VFR BLD AUTO: 35.1 % (ref 37–48.5)
HGB BLD-MCNC: 10.6 G/DL (ref 12–16)
IMM GRANULOCYTES # BLD AUTO: 0.02 K/UL (ref 0–0.04)
IMM GRANULOCYTES NFR BLD AUTO: 0.2 % (ref 0–0.5)
INR PPP: 1 (ref 0.8–1.2)
LYMPHOCYTES # BLD AUTO: 2.3 K/UL (ref 1–4.8)
LYMPHOCYTES NFR BLD: 25.1 % (ref 18–48)
MAGNESIUM SERPL-MCNC: 1.7 MG/DL (ref 1.6–2.6)
MCH RBC QN AUTO: 25.8 PG (ref 27–31)
MCHC RBC AUTO-ENTMCNC: 30.2 G/DL (ref 32–36)
MCV RBC AUTO: 85 FL (ref 82–98)
MONOCYTES # BLD AUTO: 0.8 K/UL (ref 0.3–1)
MONOCYTES NFR BLD: 8.7 % (ref 4–15)
NEUTROPHILS # BLD AUTO: 5.6 K/UL (ref 1.8–7.7)
NEUTROPHILS NFR BLD: 62.5 % (ref 38–73)
NRBC BLD-RTO: 0 /100 WBC
PLATELET # BLD AUTO: 267 K/UL (ref 150–450)
PMV BLD AUTO: 9.6 FL (ref 9.2–12.9)
POTASSIUM SERPL-SCNC: 3.8 MMOL/L (ref 3.5–5.1)
PROT SERPL-MCNC: 7.3 G/DL (ref 6–8.4)
PROTHROMBIN TIME: 10.9 SEC (ref 9–12.5)
RBC # BLD AUTO: 4.11 M/UL (ref 4–5.4)
SARS-COV-2 RDRP RESP QL NAA+PROBE: NEGATIVE
SODIUM SERPL-SCNC: 136 MMOL/L (ref 136–145)
TROPONIN I SERPL HS-MCNC: 32.3 PG/ML (ref 0–14.9)
TROPONIN I SERPL HS-MCNC: 34 PG/ML (ref 0–14.9)
WBC # BLD AUTO: 8.99 K/UL (ref 3.9–12.7)

## 2023-08-20 PROCEDURE — 93005 ELECTROCARDIOGRAM TRACING: CPT | Performed by: INTERNAL MEDICINE

## 2023-08-20 PROCEDURE — 83735 ASSAY OF MAGNESIUM: CPT | Performed by: EMERGENCY MEDICINE

## 2023-08-20 PROCEDURE — 93010 ELECTROCARDIOGRAM REPORT: CPT | Mod: ,,, | Performed by: INTERNAL MEDICINE

## 2023-08-20 PROCEDURE — 25000003 PHARM REV CODE 250: Performed by: EMERGENCY MEDICINE

## 2023-08-20 PROCEDURE — 63600175 PHARM REV CODE 636 W HCPCS: Performed by: EMERGENCY MEDICINE

## 2023-08-20 PROCEDURE — 80053 COMPREHEN METABOLIC PANEL: CPT | Performed by: EMERGENCY MEDICINE

## 2023-08-20 PROCEDURE — 85610 PROTHROMBIN TIME: CPT | Performed by: EMERGENCY MEDICINE

## 2023-08-20 PROCEDURE — G0378 HOSPITAL OBSERVATION PER HR: HCPCS

## 2023-08-20 PROCEDURE — 83880 ASSAY OF NATRIURETIC PEPTIDE: CPT | Performed by: EMERGENCY MEDICINE

## 2023-08-20 PROCEDURE — 85730 THROMBOPLASTIN TIME PARTIAL: CPT | Performed by: EMERGENCY MEDICINE

## 2023-08-20 PROCEDURE — 96374 THER/PROPH/DIAG INJ IV PUSH: CPT

## 2023-08-20 PROCEDURE — 93010 EKG 12-LEAD: ICD-10-PCS | Mod: ,,, | Performed by: INTERNAL MEDICINE

## 2023-08-20 PROCEDURE — U0002 COVID-19 LAB TEST NON-CDC: HCPCS | Performed by: EMERGENCY MEDICINE

## 2023-08-20 PROCEDURE — 84484 ASSAY OF TROPONIN QUANT: CPT | Mod: 91 | Performed by: EMERGENCY MEDICINE

## 2023-08-20 PROCEDURE — 25000003 PHARM REV CODE 250: Performed by: NURSE PRACTITIONER

## 2023-08-20 PROCEDURE — 99285 EMERGENCY DEPT VISIT HI MDM: CPT | Mod: 25

## 2023-08-20 PROCEDURE — 85025 COMPLETE CBC W/AUTO DIFF WBC: CPT | Performed by: EMERGENCY MEDICINE

## 2023-08-20 RX ORDER — IBUPROFEN 200 MG
16 TABLET ORAL
Status: DISCONTINUED | OUTPATIENT
Start: 2023-08-20 | End: 2023-08-22 | Stop reason: HOSPADM

## 2023-08-20 RX ORDER — CARVEDILOL 25 MG/1
25 TABLET ORAL 2 TIMES DAILY
Status: DISCONTINUED | OUTPATIENT
Start: 2023-08-20 | End: 2023-08-22 | Stop reason: HOSPADM

## 2023-08-20 RX ORDER — CLONIDINE HYDROCHLORIDE 0.1 MG/1
0.1 TABLET ORAL
Status: DISCONTINUED | OUTPATIENT
Start: 2023-08-20 | End: 2023-08-21

## 2023-08-20 RX ORDER — SODIUM CHLORIDE 0.9 % (FLUSH) 0.9 %
10 SYRINGE (ML) INJECTION
Status: DISCONTINUED | OUTPATIENT
Start: 2023-08-20 | End: 2023-08-22 | Stop reason: HOSPADM

## 2023-08-20 RX ORDER — NITROGLYCERIN 0.4 MG/1
0.4 TABLET SUBLINGUAL EVERY 5 MIN PRN
Status: DISCONTINUED | OUTPATIENT
Start: 2023-08-20 | End: 2023-08-22 | Stop reason: HOSPADM

## 2023-08-20 RX ORDER — MONTELUKAST SODIUM 10 MG/1
10 TABLET ORAL DAILY
Status: DISCONTINUED | OUTPATIENT
Start: 2023-08-21 | End: 2023-08-22 | Stop reason: HOSPADM

## 2023-08-20 RX ORDER — CLONIDINE 0.1 MG/24H
1 PATCH, EXTENDED RELEASE TRANSDERMAL
Status: DISCONTINUED | OUTPATIENT
Start: 2023-08-21 | End: 2023-08-22 | Stop reason: HOSPADM

## 2023-08-20 RX ORDER — GLUCAGON 1 MG
1 KIT INJECTION
Status: DISCONTINUED | OUTPATIENT
Start: 2023-08-20 | End: 2023-08-22 | Stop reason: HOSPADM

## 2023-08-20 RX ORDER — POLYETHYLENE GLYCOL 3350 17 G/17G
17 POWDER, FOR SOLUTION ORAL 2 TIMES DAILY PRN
Status: DISCONTINUED | OUTPATIENT
Start: 2023-08-20 | End: 2023-08-22 | Stop reason: HOSPADM

## 2023-08-20 RX ORDER — FUROSEMIDE 10 MG/ML
60 INJECTION INTRAMUSCULAR; INTRAVENOUS
Status: COMPLETED | OUTPATIENT
Start: 2023-08-20 | End: 2023-08-20

## 2023-08-20 RX ORDER — IBUPROFEN 200 MG
24 TABLET ORAL
Status: DISCONTINUED | OUTPATIENT
Start: 2023-08-20 | End: 2023-08-22 | Stop reason: HOSPADM

## 2023-08-20 RX ORDER — CLONIDINE HYDROCHLORIDE 0.1 MG/1
0.1 TABLET ORAL 3 TIMES DAILY PRN
Status: DISCONTINUED | OUTPATIENT
Start: 2023-08-20 | End: 2023-08-22 | Stop reason: HOSPADM

## 2023-08-20 RX ORDER — TALC
6 POWDER (GRAM) TOPICAL NIGHTLY PRN
Status: DISCONTINUED | OUTPATIENT
Start: 2023-08-20 | End: 2023-08-22 | Stop reason: HOSPADM

## 2023-08-20 RX ORDER — INSULIN ASPART 100 [IU]/ML
0-5 INJECTION, SOLUTION INTRAVENOUS; SUBCUTANEOUS
Status: DISCONTINUED | OUTPATIENT
Start: 2023-08-20 | End: 2023-08-22 | Stop reason: HOSPADM

## 2023-08-20 RX ORDER — ONDANSETRON 2 MG/ML
4 INJECTION INTRAMUSCULAR; INTRAVENOUS EVERY 8 HOURS PRN
Status: DISCONTINUED | OUTPATIENT
Start: 2023-08-20 | End: 2023-08-22 | Stop reason: HOSPADM

## 2023-08-20 RX ORDER — FUROSEMIDE 40 MG/1
40 TABLET ORAL DAILY
Status: DISCONTINUED | OUTPATIENT
Start: 2023-08-22 | End: 2023-08-21

## 2023-08-20 RX ORDER — ALLOPURINOL 300 MG/1
300 TABLET ORAL DAILY
Status: DISCONTINUED | OUTPATIENT
Start: 2023-08-21 | End: 2023-08-21

## 2023-08-20 RX ORDER — AMOXICILLIN 250 MG
1 CAPSULE ORAL 2 TIMES DAILY
Status: DISCONTINUED | OUTPATIENT
Start: 2023-08-20 | End: 2023-08-22 | Stop reason: HOSPADM

## 2023-08-20 RX ORDER — LATANOPROST 50 UG/ML
1 SOLUTION/ DROPS OPHTHALMIC NIGHTLY
Status: DISCONTINUED | OUTPATIENT
Start: 2023-08-20 | End: 2023-08-22 | Stop reason: HOSPADM

## 2023-08-20 RX ORDER — PROCHLORPERAZINE EDISYLATE 5 MG/ML
5 INJECTION INTRAMUSCULAR; INTRAVENOUS EVERY 6 HOURS PRN
Status: DISCONTINUED | OUTPATIENT
Start: 2023-08-20 | End: 2023-08-22 | Stop reason: HOSPADM

## 2023-08-20 RX ORDER — ACETAMINOPHEN 325 MG/1
650 TABLET ORAL EVERY 8 HOURS PRN
Status: DISCONTINUED | OUTPATIENT
Start: 2023-08-20 | End: 2023-08-22 | Stop reason: HOSPADM

## 2023-08-20 RX ORDER — NAPROXEN SODIUM 220 MG/1
243 TABLET, FILM COATED ORAL
Status: COMPLETED | OUTPATIENT
Start: 2023-08-20 | End: 2023-08-20

## 2023-08-20 RX ORDER — ASPIRIN 325 MG
325 TABLET ORAL
Status: DISCONTINUED | OUTPATIENT
Start: 2023-08-20 | End: 2023-08-20

## 2023-08-20 RX ORDER — LANOLIN ALCOHOL/MO/W.PET/CERES
400 CREAM (GRAM) TOPICAL DAILY PRN
Status: DISCONTINUED | OUTPATIENT
Start: 2023-08-20 | End: 2023-08-22 | Stop reason: HOSPADM

## 2023-08-20 RX ADMIN — SENNOSIDES AND DOCUSATE SODIUM 1 TABLET: 50; 8.6 TABLET ORAL at 11:08

## 2023-08-20 RX ADMIN — ASPIRIN 81 MG 243 MG: 81 TABLET ORAL at 07:08

## 2023-08-20 RX ADMIN — FUROSEMIDE 60 MG: 20 INJECTION, SOLUTION INTRAMUSCULAR; INTRAVENOUS at 07:08

## 2023-08-20 RX ADMIN — NITROGLYCERIN 1 INCH: 20 OINTMENT TOPICAL at 07:08

## 2023-08-20 NOTE — ED PROVIDER NOTES
Encounter Date: 8/20/2023       History     Chief Complaint   Patient presents with    Shortness of Breath     OFF/ON X 1 WEEK    Hypertension     X 1 WEEK     Patient with a history of renal sufficiency and cardiomyopathy.  Patient does have coronary artery disease.  Patient reports 1 week history of increasing shortness breath and fatigue.  Patient with some vague generalized chest pain.  Exertion does not necessarily make chest pain worse.  There is some increasing lower extremity edema.  Patient also reports elevated blood pressure at home.  Patient is compliant with her blood pressure medications.      Review of patient's allergies indicates:   Allergen Reactions    Percocet [oxycodone-acetaminophen] Itching    Amiodarone analogues      Itching      Irbesartan Swelling    Januvia [sitagliptin]     Jardiance [empagliflozin]      Leg cramps    Lipitor [atorvastatin] Other (See Comments)     Severe leg pain    Linaclotide Other (See Comments) and Nausea And Vomiting     Does not remember    Lubiprostone Other (See Comments) and Palpitations     Does not remember     Past Medical History:   Diagnosis Date    Anticoagulant long-term use     Arthritis     Breast cancer 2014    invasive lobular carcinoma    Cancer of kidney 11/2020    RIGHT KIDNEY CANCER    CHF (congestive heart failure)     Coronary artery disease dx 2005    Depression     Diabetes mellitus     Diastolic heart failure secondary to hypertension     Gout     Hyperlipemia     Hypertension     Hypertrophy of nasal turbinates     Kidney mass 2020    Right    Levoscoliosis     Lung nodule     left    Multiple thyroid nodules     NICOLE (obstructive sleep apnea)     uses C-PAP    Pulmonary hypertension      Past Surgical History:   Procedure Laterality Date    AORTOGRAPHY N/A 12/04/2020    Procedure: Aortogram;  Surgeon: Paul Pedersen MD;  Location: [facility] CATH;  Service: Cardiology;  Laterality: N/A;    BREAST SURGERY      CARDIAC CATHETERIZATION  12/2020     "CHOLECYSTECTOMY      COLONOSCOPY      multi -last      CORONARY ARTERY BYPASS GRAFT      ESOPHAGOGASTRODUODENOSCOPY      2012     FRACTURE SURGERY      HAND SURGERY Right     HYSTERECTOMY      LAPAROSCOPIC ROBOT-ASSISTED SURGICAL REMOVAL OF KIDNEY USING DA CHELLE XI Right 03/10/2022    Procedure: XI ROBOTIC NEPHRECTOMY- radical;  Surgeon: Rolando Ramirez MD;  Location: [facility] OR;  Service: Urology;  Laterality: Right;    MASTECTOMY W/ SENTINEL NODE BIOPSY Bilateral 2015    bilateral "dog ears"    NASAL SINUS SURGERY      Dr Bryant FESS/cauterization turbinate     PARTIAL HYSTERECTOMY      PERCUTANEOUS TRANSLUMINAL BALLOON ANGIOPLASTY OF CORONARY ARTERY  2020    Procedure: Angioplasty-coronary;  Surgeon: Paul Pedersen MD;  Location: [facility] CATH;  Service: Cardiology;;    RENAL BIOPSY Right     2021 EJ    TUBAL LIGATION      ULTRASOUND GUIDANCE  2020    Procedure: Ultrasound Guidance;  Surgeon: Paul Pedersen MD;  Location: [facility] CATH;  Service: Cardiology;;     Family History   Problem Relation Age of Onset    Breast cancer Mother     Stroke Father     Hypertension Father     Hepatitis Brother     Asthma Daughter     Birth defects Daughter         Nell's two children has cleft lips    Depression Daughter     Drug abuse Daughter     Learning disabilities Daughter     Mental illness Daughter     Breast cancer Maternal Aunt     Glaucoma Sister     Drug abuse Daughter     Macular degeneration Neg Hx     Retinal detachment Neg Hx      Social History     Tobacco Use    Smoking status: Former     Current packs/day: 0.00     Types: Cigarettes     Start date: 2016     Quit date: 2016     Years since quittin.6    Smokeless tobacco: Never    Tobacco comments:     quit    Substance Use Topics    Alcohol use: No    Drug use: No     Review of Systems   Constitutional:  Negative for chills and fever.   HENT:  Negative for congestion.    Eyes:  Negative for visual " disturbance.   Respiratory:  Positive for shortness of breath.    Cardiovascular:  Positive for chest pain. Negative for palpitations.   Gastrointestinal:  Negative for abdominal pain and vomiting.   Genitourinary:  Negative for dysuria.   Musculoskeletal:  Negative for joint swelling.   Neurological:  Positive for weakness. Negative for headaches.   Psychiatric/Behavioral:  Negative for confusion.        Physical Exam     Initial Vitals [08/20/23 1748]   BP Pulse Resp Temp SpO2   (!) 168/104 86 20 98.7 °F (37.1 °C) 97 %      MAP       --         Physical Exam    Nursing note and vitals reviewed.  Constitutional: She is not diaphoretic. No distress.   HENT:   Head: Normocephalic and atraumatic.   Eyes: Conjunctivae are normal.   Neck:   Normal range of motion.  Cardiovascular:  Normal rate.           Pulmonary/Chest:   Mild bilateral inspiratory basilar crackles   Abdominal: Abdomen is soft. There is no abdominal tenderness.   Musculoskeletal:         General: Normal range of motion.      Cervical back: Normal range of motion.      Comments: All extremities are neurovascular intact.  1+ bilateral pretibial edema     Neurological: She is alert. She has normal strength. No cranial nerve deficit or sensory deficit.   No gross deficits   Skin: No rash noted.   Psychiatric: She has a normal mood and affect.         ED Course   Procedures  Labs Reviewed   CBC W/ AUTO DIFFERENTIAL - Abnormal; Notable for the following components:       Result Value    Hemoglobin 10.6 (*)     Hematocrit 35.1 (*)     MCH 25.8 (*)     MCHC 30.2 (*)     RDW 15.3 (*)     All other components within normal limits   COMPREHENSIVE METABOLIC PANEL - Abnormal; Notable for the following components:    Glucose 123 (*)     BUN 56 (*)     Creatinine 3.0 (*)     Albumin 3.4 (*)     eGFR 16.1 (*)     All other components within normal limits   TROPONIN I HIGH SENSITIVITY - Abnormal; Notable for the following components:    Troponin I High Sensitivity 34.0  (*)     All other components within normal limits   B-TYPE NATRIURETIC PEPTIDE - Abnormal; Notable for the following components:     (*)     All other components within normal limits   PROTIME-INR   SARS-COV-2 RNA AMPLIFICATION, QUAL   APTT   MAGNESIUM   TROPONIN I HIGH SENSITIVITY          Imaging Results              X-Ray Chest AP Portable (Final result)  Result time 08/20/23 18:38:47      Final result by Wanda Robertson DO (08/20/23 18:38:47)                   Narrative:    AP chest radiograph: 8/20/2023 6:37 PM CDT    History: 73 years  old Female with Chest Pain.    Comparison: None available    Findings: The cardiomediastinal silhouette is enlarged.    No pneumothorax is seen.    There is mild interstitial prominence seen.    No discrete pleural effusion is apparent.    There are low lung volumes noted.    Impression: There is mild interstitial prominence which can be due to edema and/or low lung volumes.    Electronically signed by:  Wanda Robertson DO  8/20/2023 6:38 PM CDT Workstation: [id number]                                     Medications   cloNIDine tablet 0.1 mg (0.1 mg Oral Not Given 8/20/23 1800)   furosemide injection 60 mg (has no administration in time range)   nitroGLYCERIN 2% TD oint ointment 1 inch (has no administration in time range)   aspirin chewable tablet 243 mg (243 mg Oral Given 8/20/23 1914)     Medical Decision Making  Patient presents with several day history of chest pain and shortness of breath.  Patient has elevated BNP and chest x-ray consistent with pulmonary edema.  Patient does have accelerated hypertension.  Patient given clonidine, nitrates and furosemide.  Patient has chest pain.  Patient does have significant ST segment changes.  Known coronary artery disease.  Troponin is 34.  Patient will need serial troponins.  Will need continued diuresis.  Creatinine is stable at 3.0 currently.      Amount and/or Complexity of Data Reviewed  Labs: ordered. Decision-making  details documented in ED Course.  Radiology: ordered and independent interpretation performed. Decision-making details documented in ED Course.     Details: Bilateral interstitial edema  ECG/medicine tests: independent interpretation performed. Decision-making details documented in ED Course.     Details: Normal sinus rhythm with ST depression in inferior lateral leads    Risk  OTC drugs.  Prescription drug management.  Decision regarding hospitalization.                               Clinical Impression:   Final diagnoses:  [R07.9] Chest pain  [I50.21] Acute systolic congestive heart failure (Primary)               Wiliam Angel MD  08/20/23 8166

## 2023-08-21 ENCOUNTER — CLINICAL SUPPORT (OUTPATIENT)
Dept: CARDIOLOGY | Facility: HOSPITAL | Age: 73
DRG: 291 | End: 2023-08-21
Attending: EMERGENCY MEDICINE
Payer: MEDICARE

## 2023-08-21 VITALS — BODY MASS INDEX: 44.54 KG/M2 | HEIGHT: 67 IN | WEIGHT: 283.75 LBS

## 2023-08-21 PROBLEM — G47.33 OSA (OBSTRUCTIVE SLEEP APNEA): Chronic | Status: ACTIVE | Noted: 2018-06-28

## 2023-08-21 PROBLEM — I50.9 CONGESTIVE HEART FAILURE: Chronic | Status: ACTIVE | Noted: 2020-11-30

## 2023-08-21 PROBLEM — N18.4: Chronic | Status: ACTIVE | Noted: 2022-08-20

## 2023-08-21 PROBLEM — I48.0 PAROXYSMAL ATRIAL FIBRILLATION: Chronic | Status: ACTIVE | Noted: 2021-12-10

## 2023-08-21 PROBLEM — I16.0 HYPERTENSIVE URGENCY: Status: ACTIVE | Noted: 2023-08-21

## 2023-08-21 PROBLEM — I25.10 CORONARY ARTERY DISEASE: Chronic | Status: ACTIVE | Noted: 2017-08-31

## 2023-08-21 LAB
ALBUMIN SERPL BCP-MCNC: 3.2 G/DL (ref 3.5–5.2)
ALP SERPL-CCNC: 100 U/L (ref 55–135)
ALT SERPL W/O P-5'-P-CCNC: 13 U/L (ref 10–44)
ANION GAP SERPL CALC-SCNC: 12 MMOL/L (ref 8–16)
AST SERPL-CCNC: 24 U/L (ref 10–40)
BASOPHILS # BLD AUTO: 0.05 K/UL (ref 0–0.2)
BASOPHILS NFR BLD: 0.6 % (ref 0–1.9)
BILIRUB SERPL-MCNC: 0.7 MG/DL (ref 0.1–1)
BUN SERPL-MCNC: 69 MG/DL (ref 8–23)
CALCIUM SERPL-MCNC: 9.3 MG/DL (ref 8.7–10.5)
CHLORIDE SERPL-SCNC: 108 MMOL/L (ref 95–110)
CO2 SERPL-SCNC: 20 MMOL/L (ref 23–29)
CREAT SERPL-MCNC: 3.6 MG/DL (ref 0.5–1.4)
DIFFERENTIAL METHOD: ABNORMAL
EOSINOPHIL # BLD AUTO: 0.3 K/UL (ref 0–0.5)
EOSINOPHIL NFR BLD: 3.2 % (ref 0–8)
ERYTHROCYTE [DISTWIDTH] IN BLOOD BY AUTOMATED COUNT: 15.8 % (ref 11.5–14.5)
EST. GFR  (NO RACE VARIABLE): 12.9 ML/MIN/1.73 M^2
GLUCOSE SERPL-MCNC: 120 MG/DL (ref 70–110)
GLUCOSE SERPL-MCNC: 144 MG/DL (ref 70–110)
HCT VFR BLD AUTO: 36.5 % (ref 37–48.5)
HGB BLD-MCNC: 10.9 G/DL (ref 12–16)
IMM GRANULOCYTES # BLD AUTO: 0.01 K/UL (ref 0–0.04)
IMM GRANULOCYTES NFR BLD AUTO: 0.1 % (ref 0–0.5)
LYMPHOCYTES # BLD AUTO: 2.8 K/UL (ref 1–4.8)
LYMPHOCYTES NFR BLD: 32.7 % (ref 18–48)
MCH RBC QN AUTO: 26.4 PG (ref 27–31)
MCHC RBC AUTO-ENTMCNC: 29.9 G/DL (ref 32–36)
MCV RBC AUTO: 88 FL (ref 82–98)
MONOCYTES # BLD AUTO: 0.7 K/UL (ref 0.3–1)
MONOCYTES NFR BLD: 8.7 % (ref 4–15)
NEUTROPHILS # BLD AUTO: 4.6 K/UL (ref 1.8–7.7)
NEUTROPHILS NFR BLD: 54.7 % (ref 38–73)
NRBC BLD-RTO: 0 /100 WBC
PLATELET # BLD AUTO: 281 K/UL (ref 150–450)
PMV BLD AUTO: 10.4 FL (ref 9.2–12.9)
POTASSIUM SERPL-SCNC: 4.6 MMOL/L (ref 3.5–5.1)
PROT SERPL-MCNC: 6.9 G/DL (ref 6–8.4)
RBC # BLD AUTO: 4.13 M/UL (ref 4–5.4)
SODIUM SERPL-SCNC: 140 MMOL/L (ref 136–145)
TROPONIN I SERPL HS-MCNC: 31.6 PG/ML (ref 0–14.9)
WBC # BLD AUTO: 8.48 K/UL (ref 3.9–12.7)

## 2023-08-21 PROCEDURE — 25000003 PHARM REV CODE 250: Performed by: NURSE PRACTITIONER

## 2023-08-21 PROCEDURE — 84484 ASSAY OF TROPONIN QUANT: CPT | Performed by: NURSE PRACTITIONER

## 2023-08-21 PROCEDURE — 94761 N-INVAS EAR/PLS OXIMETRY MLT: CPT

## 2023-08-21 PROCEDURE — 93306 TTE W/DOPPLER COMPLETE: CPT

## 2023-08-21 PROCEDURE — 93306 ECHO (CUPID ONLY): ICD-10-PCS | Mod: 26,,, | Performed by: SPECIALIST

## 2023-08-21 PROCEDURE — 94660 CPAP INITIATION&MGMT: CPT

## 2023-08-21 PROCEDURE — 85025 COMPLETE CBC W/AUTO DIFF WBC: CPT | Performed by: NURSE PRACTITIONER

## 2023-08-21 PROCEDURE — 99900035 HC TECH TIME PER 15 MIN (STAT)

## 2023-08-21 PROCEDURE — 27000221 HC OXYGEN, UP TO 24 HOURS

## 2023-08-21 PROCEDURE — 93306 TTE W/DOPPLER COMPLETE: CPT | Mod: 26,,, | Performed by: SPECIALIST

## 2023-08-21 PROCEDURE — 36415 COLL VENOUS BLD VENIPUNCTURE: CPT | Performed by: NURSE PRACTITIONER

## 2023-08-21 PROCEDURE — 21400001 HC TELEMETRY ROOM

## 2023-08-21 PROCEDURE — 80053 COMPREHEN METABOLIC PANEL: CPT | Performed by: NURSE PRACTITIONER

## 2023-08-21 RX ORDER — SODIUM,POTASSIUM PHOSPHATES 280-250MG
2 POWDER IN PACKET (EA) ORAL
Status: DISCONTINUED | OUTPATIENT
Start: 2023-08-21 | End: 2023-08-22 | Stop reason: HOSPADM

## 2023-08-21 RX ORDER — LABETALOL HYDROCHLORIDE 5 MG/ML
10 INJECTION, SOLUTION INTRAVENOUS EVERY 6 HOURS PRN
Status: DISCONTINUED | OUTPATIENT
Start: 2023-08-21 | End: 2023-08-22 | Stop reason: HOSPADM

## 2023-08-21 RX ORDER — LANOLIN ALCOHOL/MO/W.PET/CERES
800 CREAM (GRAM) TOPICAL
Status: DISCONTINUED | OUTPATIENT
Start: 2023-08-21 | End: 2023-08-22 | Stop reason: HOSPADM

## 2023-08-21 RX ORDER — FUROSEMIDE 10 MG/ML
80 INJECTION INTRAMUSCULAR; INTRAVENOUS
Status: DISCONTINUED | OUTPATIENT
Start: 2023-08-21 | End: 2023-08-21

## 2023-08-21 RX ORDER — FUROSEMIDE 10 MG/ML
40 INJECTION INTRAMUSCULAR; INTRAVENOUS ONCE
Status: DISCONTINUED | OUTPATIENT
Start: 2023-08-21 | End: 2023-08-21

## 2023-08-21 RX ADMIN — SPIRONOLACTONE 12.5 MG: 25 TABLET ORAL at 09:08

## 2023-08-21 RX ADMIN — CARVEDILOL 25 MG: 25 TABLET, FILM COATED ORAL at 08:08

## 2023-08-21 RX ADMIN — CLONIDINE HYDROCHLORIDE 0.1 MG: 0.1 TABLET ORAL at 08:08

## 2023-08-21 RX ADMIN — APIXABAN 5 MG: 5 TABLET, FILM COATED ORAL at 09:08

## 2023-08-21 RX ADMIN — Medication 400 MG: at 08:08

## 2023-08-21 RX ADMIN — LATANOPROST 1 DROP: 50 SOLUTION OPHTHALMIC at 12:08

## 2023-08-21 RX ADMIN — APIXABAN 5 MG: 5 TABLET, FILM COATED ORAL at 08:08

## 2023-08-21 RX ADMIN — ALLOPURINOL 300 MG: 300 TABLET ORAL at 09:08

## 2023-08-21 RX ADMIN — ACETAMINOPHEN 650 MG: 325 TABLET ORAL at 05:08

## 2023-08-21 RX ADMIN — SENNOSIDES AND DOCUSATE SODIUM 1 TABLET: 50; 8.6 TABLET ORAL at 08:08

## 2023-08-21 RX ADMIN — SENNOSIDES AND DOCUSATE SODIUM 1 TABLET: 50; 8.6 TABLET ORAL at 09:08

## 2023-08-21 RX ADMIN — MONTELUKAST 10 MG: 10 TABLET, FILM COATED ORAL at 09:08

## 2023-08-21 RX ADMIN — LATANOPROST 1 DROP: 50 SOLUTION OPHTHALMIC at 08:08

## 2023-08-21 RX ADMIN — CARVEDILOL 25 MG: 25 TABLET, FILM COATED ORAL at 09:08

## 2023-08-21 NOTE — CARE UPDATE
08/21/23 0030   Patient Assessment/Suction   Level of Consciousness (AVPU) alert   Respiratory Effort Normal;Unlabored   Expansion/Accessory Muscles/Retractions no use of accessory muscles   All Lung Fields Breath Sounds equal bilaterally;diminished   Rhythm/Pattern, Respiratory assisted mechanically   Cough Frequency no cough   PRE-TX-O2   Device (Oxygen Therapy) CPAP   SpO2 96 %   Pulse Oximetry Type Intermittent   $ Pulse Oximetry - Multiple Charge Pulse Oximetry - Multiple   Pulse 81   Resp (!) 21   Preset CPAP/BiPAP Settings   Mode Of Delivery CPAP   $ CPAP/BiPAP Daily Charge BiPAP/CPAP Daily   $ Initial CPAP/BiPAP Setup? Yes   $ Is patient using? Yes   Size of Mask Medium/Large   Sized Appropriately? Yes   Equipment Type V60   Airway Device Type medium full face mask   CPAP (cm H2O) 10   Patient CPAP/BiPAP Settings   RR Total (Breaths/Min) 21   Tidal Volume (mL) 620   VE Minute Ventilation (L/min) 12.8 L/min   Total Leak (L/Min) 0   Patient Trigger - ST Mode Only (%) 100

## 2023-08-21 NOTE — SUBJECTIVE & OBJECTIVE
"Past Medical History:   Diagnosis Date    Anticoagulant long-term use     Arthritis     Breast cancer 2014    invasive lobular carcinoma    Cancer of kidney 11/2020    RIGHT KIDNEY CANCER    CHF (congestive heart failure)     Coronary artery disease dx 2005    Depression     Diabetes mellitus     Diastolic heart failure secondary to hypertension     Gout     Hyperlipemia     Hypertension     Hypertrophy of nasal turbinates     Kidney mass 2020    Right    Levoscoliosis     Lung nodule     left    Multiple thyroid nodules     NICOLE (obstructive sleep apnea)     uses C-PAP    Pulmonary hypertension        Past Surgical History:   Procedure Laterality Date    AORTOGRAPHY N/A 12/04/2020    Procedure: Aortogram;  Surgeon: Paul Pedersen MD;  Location: [facility] CATH;  Service: Cardiology;  Laterality: N/A;    BREAST SURGERY      CARDIAC CATHETERIZATION  12/2020    CHOLECYSTECTOMY      COLONOSCOPY      multi -last 2014     CORONARY ARTERY BYPASS GRAFT      ESOPHAGOGASTRODUODENOSCOPY      2012     FRACTURE SURGERY      HAND SURGERY Right     HYSTERECTOMY      LAPAROSCOPIC ROBOT-ASSISTED SURGICAL REMOVAL OF KIDNEY USING DA CHELLE XI Right 03/10/2022    Procedure: XI ROBOTIC NEPHRECTOMY- radical;  Surgeon: Rolando Ramirez MD;  Location: [facility] OR;  Service: Urology;  Laterality: Right;    MASTECTOMY W/ SENTINEL NODE BIOPSY Bilateral 01/21/2015    bilateral "dog ears"    NASAL SINUS SURGERY  2015    Dr Bryant FESS/cauterization turbinate     PARTIAL HYSTERECTOMY  1989    PERCUTANEOUS TRANSLUMINAL BALLOON ANGIOPLASTY OF CORONARY ARTERY  12/04/2020    Procedure: Angioplasty-coronary;  Surgeon: Paul Pedersen MD;  Location: [facility] CATH;  Service: Cardiology;;    RENAL BIOPSY Right     9/20/2021 EJ    TUBAL LIGATION      ULTRASOUND GUIDANCE  12/04/2020    Procedure: Ultrasound Guidance;  Surgeon: Paul Pedersen MD;  Location: [facility] CATH;  Service: Cardiology;;       Review of patient's allergies indicates:   Allergen Reactions    " Percocet [oxycodone-acetaminophen] Itching    Amiodarone analogues      Itching      Irbesartan Swelling    Januvia [sitagliptin]     Jardiance [empagliflozin]      Leg cramps    Lipitor [atorvastatin] Other (See Comments)     Severe leg pain    Linaclotide Other (See Comments) and Nausea And Vomiting     Does not remember    Lubiprostone Other (See Comments) and Palpitations     Does not remember       No current facility-administered medications on file prior to encounter.     Current Outpatient Medications on File Prior to Encounter   Medication Sig    allopurinoL (ZYLOPRIM) 300 MG tablet Take 1 tablet (300 mg total) by mouth once daily.    carvediloL (COREG) 25 MG tablet TAKE ONE TABLET BY MOUTH TWICE DAILY    cloNIDine (CATAPRES) 0.1 MG tablet Take 1 tablet (0.1 mg total) by mouth 3 (three) times daily as needed (PRN SBP > 165 mmHg).    ELIQUIS 5 mg Tab TAKE ONE TABLET BY MOUTH TWICE DAILY    furosemide (LASIX) 40 MG tablet Take 40 mg by mouth once daily.    glipiZIDE 5 MG TR24 Take 2 tablets (10 mg total) by mouth daily with breakfast. (Patient taking differently: Take 5 mg by mouth daily with breakfast.)    latanoprost 0.005 % ophthalmic solution Place 1 drop into both eyes every evening.    loratadine (CLARITIN) 10 mg tablet Take 10 mg by mouth once daily.    magnesium oxide (MAG-OX) 400 mg (241.3 mg magnesium) tablet Take 1 tablet (400 mg total) by mouth daily as needed (cramping).    montelukast (SINGULAIR) 10 mg tablet TAKE ONE TABLET BY MOUTH EVERY EVENING FOR allergies    PRALUENT PEN 75 mg/mL PnIj Inject 1 mL (75 mg total) into the skin every 14 (fourteen) days.    spironolactone (ALDACTONE) 25 MG tablet Take 0.5 tablets (12.5 mg total) by mouth once daily.    blood sugar diagnostic (TRUE METRIX GLUCOSE TEST STRIP) Strp Use 1x daily. Insurance preferred.    blood sugar diagnostic Strp To check BG 1 time daily, to use with insurance preferred meter    blood-glucose meter kit To check BG 1 times daily,  to use with insurance preferred meter    cloNIDine 0.1 mg/24 hr td ptwk (CATAPRES) 0.1 mg/24 hr Place 1 patch onto the skin every 7 days.    famotidine (PEPCID) 20 MG tablet Take 1 tablet (20 mg total) by mouth once daily.    lancets Misc To check BG 1 times daily, to use with insurance preferred meter    lancets Misc To check BG 1 times daily, to use with insurance preferred meter    leg brace Misc Leg brace: Ossor Unloaded OneX for lateral unloading, right leg    nitroGLYCERIN (NITROSTAT) 0.4 MG SL tablet Place 1 tablet (0.4 mg total) under the tongue every 5 (five) minutes as needed for Chest pain.    traMADoL (ULTRAM) 50 mg tablet Take by mouth.    [DISCONTINUED] aspirin (ECOTRIN) 81 MG EC tablet Take 1 tablet (81 mg total) by mouth once daily.    [DISCONTINUED] DULoxetine (CYMBALTA) 20 MG capsule TAKE ONE CAPSULE BY MOUTH ONCE DAILY    [DISCONTINUED] folic acid (FOLVITE) 1 MG tablet Take 1 mg by mouth once daily.    [DISCONTINUED] metFORMIN (GLUCOPHAGE-XR) 500 MG ER 24hr tablet Take 2 tablets (1,000 mg total) by mouth 2 (two) times daily with meals.    [DISCONTINUED] sodium bicarbonate 650 MG tablet Take 1 tablet (650 mg total) by mouth once daily.     Family History       Problem Relation (Age of Onset)    Asthma Daughter    Birth defects Daughter    Breast cancer Mother, Maternal Aunt    Depression Daughter    Drug abuse Daughter, Daughter    Glaucoma Sister    Hepatitis Brother    Hypertension Father    Learning disabilities Daughter    Mental illness Daughter    Stroke Father          Tobacco Use    Smoking status: Former     Current packs/day: 0.00     Types: Cigarettes     Start date: 2016     Quit date: 2016     Years since quittin.6    Smokeless tobacco: Never    Tobacco comments:     quit    Substance and Sexual Activity    Alcohol use: No    Drug use: No    Sexual activity: Not Currently     Partners: Male     Birth control/protection: None     Review of Systems    Constitutional:  Positive for activity change, fatigue and unexpected weight change. Negative for appetite change, chills, diaphoresis and fever.   HENT:  Negative for congestion, dental problem, facial swelling, nosebleeds, sinus pressure, sinus pain, sore throat and trouble swallowing.    Eyes:  Negative for pain and redness.   Respiratory:  Positive for shortness of breath. Negative for apnea, cough, chest tightness and wheezing.    Cardiovascular:  Negative for chest pain, palpitations and leg swelling.   Gastrointestinal:  Negative for abdominal distention, abdominal pain, anal bleeding, blood in stool, constipation, diarrhea, nausea and vomiting.   Endocrine: Negative for polyphagia and polyuria.   Genitourinary:  Negative for decreased urine volume, difficulty urinating, dysuria, frequency, hematuria and urgency.   Musculoskeletal:  Negative for back pain, gait problem, joint swelling, myalgias, neck pain and neck stiffness.   Skin:  Negative for color change, pallor, rash and wound.   Neurological:  Negative for dizziness, seizures, syncope, facial asymmetry, speech difficulty, weakness, light-headedness, numbness and headaches.   Psychiatric/Behavioral:  Negative for agitation, behavioral problems, confusion, hallucinations, sleep disturbance and suicidal ideas.      Objective:     Vital Signs (Most Recent):  Temp: 98.7 °F (37.1 °C) (08/20/23 1748)  Pulse: 79 (08/20/23 2028)  Resp: (!) 21 (08/20/23 2028)  BP: (!) 164/79 (08/20/23 2028)  SpO2: 95 % (08/20/23 2018) Vital Signs (24h Range):  Temp:  [98.7 °F (37.1 °C)] 98.7 °F (37.1 °C)  Pulse:  [79-86] 79  Resp:  [20-25] 21  SpO2:  [94 %-98 %] 95 %  BP: (159-168)/() 164/79     Weight: 129.3 kg (285 lb)  Body mass index is 44.64 kg/m².     Physical Exam  Vitals reviewed.   Constitutional:       General: She is not in acute distress.     Appearance: Normal appearance. She is obese. She is not ill-appearing, toxic-appearing or diaphoretic.   HENT:       Head: Normocephalic.      Right Ear: External ear normal.      Left Ear: External ear normal.      Nose: No congestion or rhinorrhea.      Mouth/Throat:      Mouth: Mucous membranes are moist.      Pharynx: Oropharynx is clear. No oropharyngeal exudate or posterior oropharyngeal erythema.   Eyes:      Extraocular Movements: Extraocular movements intact.      Conjunctiva/sclera: Conjunctivae normal.      Pupils: Pupils are equal, round, and reactive to light.   Cardiovascular:      Rate and Rhythm: Normal rate and regular rhythm.      Pulses: Normal pulses.      Heart sounds: Normal heart sounds.   Pulmonary:      Effort: Pulmonary effort is normal.      Breath sounds: Normal breath sounds.   Abdominal:      General: Abdomen is flat. Bowel sounds are normal.      Palpations: Abdomen is soft.   Genitourinary:     Rectum: Normal.   Musculoskeletal:         General: Normal range of motion.      Cervical back: Normal range of motion and neck supple.      Right lower leg: Edema present.      Left lower leg: Edema present.   Skin:     General: Skin is warm and dry.      Capillary Refill: Capillary refill takes less than 2 seconds.   Neurological:      Mental Status: She is alert and oriented to person, place, and time.   Psychiatric:         Mood and Affect: Mood normal.              CRANIAL NERVES     CN III, IV, VI   Pupils are equal, round, and reactive to light.       Significant Labs: All pertinent labs within the past 24 hours have been reviewed.    Significant Imaging: I have reviewed all pertinent imaging results/findings within the past 24 hours.

## 2023-08-21 NOTE — CARE UPDATE
08/21/23 0855   Skin Integrity   $ Wound Care Tech Time 15 min   Area Observed Bridge of nose   Skin Appearance without discoloration   PRE-TX-O2   Device (Oxygen Therapy) room air   $ Is the patient on Low Flow Oxygen? Yes  (cpap on sb)   SpO2 96 %   Pulse Oximetry Type Intermittent   $ Pulse Oximetry - Multiple Charge Pulse Oximetry - Multiple   Pulse 81   Resp 15   Preset CPAP/BiPAP Settings   $ CPAP/BiPAP Daily Charge BiPAP/CPAP Daily   Respiratory Evaluation   $ Care Plan Tech Time 15 min

## 2023-08-21 NOTE — H&P
[facility] Medicine  History & Physical    Patient Name: Juhi Taylor  MRN: [id number]  Patient Class: OP- Observation  Admission Date: 8/20/2023  Attending Physician: Natanael Franklin MD   Primary Care Provider: Tony Sadler MD         Patient information was obtained from patient, relative(s) and ER records.     Subjective:     Principal Problem:Congestive heart failure    Chief Complaint:   Chief Complaint   Patient presents with    Shortness of Breath     OFF/ON X 1 WEEK    Hypertension     X 1 WEEK        HPI: Patient is a 73-year-old  female with history of combined heart failure, diabetes mellitus, CKD, paroxysmal atrial fibrillation on Eliquis, CAD (followed by Dr. Pedersen), nonrheumatic mitral regurgitation, pulmonary hypertension, hypertension, hyperlipidemia - intolerant to statins, hypothyroidism, CKD (followed by Dr. Martinez), right renal carcinoma - status post right nephrectomy on 03/10/2022. She presents to the ED with complaints of increased shortness of breath on exertion, intermittent chronic chest discomfort, lower leg edema and hypertension.  On assessment in ED initial blood pressure 168/104, creatinine 3.0, BUN 56, GFR 16.4, troponin 34.0, 32.3, .  IV Lasix 60 mg 1 time dose ordered in ED.    On assessment patient is alert oriented x4.  She reports chronic intermittent chest discomfort however states that it is her main complaint.  She reports her main complaint was the increased weight gain in the last 2 weeks, lower leg edema, increased shortness of breath with exertion and at rest, increased fatigue and weakness.  She reports monitoring her blood pressure at home and noticed increase with SBP >200 and DBP >120 for last 5 days.  She reports increased 10-20 lb weight gain within the last couple of months.  She denies dysuria.  She reports use of spironolactone, and Lasix on alternating days.  Last dose of Lasix 1 day ago.  She  reports Nephrology spoke with her regarding dialysis possible near future.  She reports compliance with Eliquis daily however denies use of amiodarone, and Calcitrol.  She denies lightheaded, dizziness, nausea, vomiting, dysuria, hematuria, hematochezia, fever, chills.  She denies smoking, illicit drugs, alcohol.    Hospitalist asked to admit for hypertensive urgency, CHF exacerbation, Consult Nephrology.  Discussed  Plan with attending    Impression:  Chest x-ray one view 8/20/23  There is mild interstitial prominence which can be due to edema and/or low lung volumes     Last echo  Summary 11/19/2021     The estimated ejection fraction is 35-40%.   The left ventricle is moderately enlarged with moderate concentric hypertrophy   Severe left atrial enlargement.   Normal right ventricular size with normal right ventricular systolic function.   Mild-to-moderate mitral regurgitation.   Mild tricuspid regurgitation.   The estimated PA systolic pressure is 44 mmHg.   There is pulmonary hypertension.      Past Medical History:   Diagnosis Date    Anticoagulant long-term use     Arthritis     Breast cancer 2014    invasive lobular carcinoma    Cancer of kidney 11/2020    RIGHT KIDNEY CANCER    CHF (congestive heart failure)     Coronary artery disease dx 2005    Depression     Diabetes mellitus     Diastolic heart failure secondary to hypertension     Gout     Hyperlipemia     Hypertension     Hypertrophy of nasal turbinates     Kidney mass 2020    Right    Levoscoliosis     Lung nodule     left    Multiple thyroid nodules     NICOLE (obstructive sleep apnea)     uses C-PAP    Pulmonary hypertension        Past Surgical History:   Procedure Laterality Date    AORTOGRAPHY N/A 12/04/2020    Procedure: Aortogram;  Surgeon: Paul Pedersen MD;  Location: [facility];  Service: Cardiology;  Laterality: N/A;    BREAST SURGERY      CARDIAC CATHETERIZATION  12/2020    CHOLECYSTECTOMY      COLONOSCOPY       "multi -last 2014     CORONARY ARTERY BYPASS GRAFT      ESOPHAGOGASTRODUODENOSCOPY      2012     FRACTURE SURGERY      HAND SURGERY Right     HYSTERECTOMY      LAPAROSCOPIC ROBOT-ASSISTED SURGICAL REMOVAL OF KIDNEY USING DA CHELLE XI Right 03/10/2022    Procedure: XI ROBOTIC NEPHRECTOMY- radical;  Surgeon: Rolando Ramirez MD;  Location: [facility] OR;  Service: Urology;  Laterality: Right;    MASTECTOMY W/ SENTINEL NODE BIOPSY Bilateral 01/21/2015    bilateral "dog ears"    NASAL SINUS SURGERY  2015    Dr Bryant FESS/cauterization turbinate     PARTIAL HYSTERECTOMY  1989    PERCUTANEOUS TRANSLUMINAL BALLOON ANGIOPLASTY OF CORONARY ARTERY  12/04/2020    Procedure: Angioplasty-coronary;  Surgeon: Paul Pedersen MD;  Location: [facility] CATH;  Service: Cardiology;;    RENAL BIOPSY Right     9/20/2021 EJ    TUBAL LIGATION      ULTRASOUND GUIDANCE  12/04/2020    Procedure: Ultrasound Guidance;  Surgeon: Paul Pedersen MD;  Location: [facility] CATH;  Service: Cardiology;;       Review of patient's allergies indicates:   Allergen Reactions    Percocet [oxycodone-acetaminophen] Itching    Amiodarone analogues      Itching      Irbesartan Swelling    Januvia [sitagliptin]     Jardiance [empagliflozin]      Leg cramps    Lipitor [atorvastatin] Other (See Comments)     Severe leg pain    Linaclotide Other (See Comments) and Nausea And Vomiting     Does not remember    Lubiprostone Other (See Comments) and Palpitations     Does not remember       No current facility-administered medications on file prior to encounter.     Current Outpatient Medications on File Prior to Encounter   Medication Sig    allopurinoL (ZYLOPRIM) 300 MG tablet Take 1 tablet (300 mg total) by mouth once daily.    carvediloL (COREG) 25 MG tablet TAKE ONE TABLET BY MOUTH TWICE DAILY    cloNIDine (CATAPRES) 0.1 MG tablet Take 1 tablet (0.1 mg total) by mouth 3 (three) times daily as needed (PRN SBP > 165 mmHg).    ELIQUIS 5 mg Tab TAKE ONE " TABLET BY MOUTH TWICE DAILY    furosemide (LASIX) 40 MG tablet Take 40 mg by mouth once daily.    glipiZIDE 5 MG TR24 Take 2 tablets (10 mg total) by mouth daily with breakfast. (Patient taking differently: Take 5 mg by mouth daily with breakfast.)    latanoprost 0.005 % ophthalmic solution Place 1 drop into both eyes every evening.    loratadine (CLARITIN) 10 mg tablet Take 10 mg by mouth once daily.    magnesium oxide (MAG-OX) 400 mg (241.3 mg magnesium) tablet Take 1 tablet (400 mg total) by mouth daily as needed (cramping).    montelukast (SINGULAIR) 10 mg tablet TAKE ONE TABLET BY MOUTH EVERY EVENING FOR allergies    PRALUENT PEN 75 mg/mL PnIj Inject 1 mL (75 mg total) into the skin every 14 (fourteen) days.    spironolactone (ALDACTONE) 25 MG tablet Take 0.5 tablets (12.5 mg total) by mouth once daily.    blood sugar diagnostic (TRUE METRIX GLUCOSE TEST STRIP) Strp Use 1x daily. Insurance preferred.    blood sugar diagnostic Strp To check BG 1 time daily, to use with insurance preferred meter    blood-glucose meter kit To check BG 1 times daily, to use with insurance preferred meter    cloNIDine 0.1 mg/24 hr td ptwk (CATAPRES) 0.1 mg/24 hr Place 1 patch onto the skin every 7 days.    famotidine (PEPCID) 20 MG tablet Take 1 tablet (20 mg total) by mouth once daily.    lancets Misc To check BG 1 times daily, to use with insurance preferred meter    lancets Misc To check BG 1 times daily, to use with insurance preferred meter    leg brace Misc Leg brace: Ossor Unloaded OneX for lateral unloading, right leg    nitroGLYCERIN (NITROSTAT) 0.4 MG SL tablet Place 1 tablet (0.4 mg total) under the tongue every 5 (five) minutes as needed for Chest pain.    traMADoL (ULTRAM) 50 mg tablet Take by mouth.    [DISCONTINUED] aspirin (ECOTRIN) 81 MG EC tablet Take 1 tablet (81 mg total) by mouth once daily.    [DISCONTINUED] DULoxetine (CYMBALTA) 20 MG capsule TAKE ONE CAPSULE BY MOUTH ONCE DAILY     [DISCONTINUED] folic acid (FOLVITE) 1 MG tablet Take 1 mg by mouth once daily.    [DISCONTINUED] metFORMIN (GLUCOPHAGE-XR) 500 MG ER 24hr tablet Take 2 tablets (1,000 mg total) by mouth 2 (two) times daily with meals.    [DISCONTINUED] sodium bicarbonate 650 MG tablet Take 1 tablet (650 mg total) by mouth once daily.     Family History       Problem Relation (Age of Onset)    Asthma Daughter    Birth defects Daughter    Breast cancer Mother, Maternal Aunt    Depression Daughter    Drug abuse Daughter, Daughter    Glaucoma Sister    Hepatitis Brother    Hypertension Father    Learning disabilities Daughter    Mental illness Daughter    Stroke Father          Tobacco Use    Smoking status: Former     Current packs/day: 0.00     Types: Cigarettes     Start date: 2016     Quit date: 2016     Years since quittin.6    Smokeless tobacco: Never    Tobacco comments:     quit    Substance and Sexual Activity    Alcohol use: No    Drug use: No    Sexual activity: Not Currently     Partners: Male     Birth control/protection: None     Review of Systems   Constitutional:  Positive for activity change, fatigue and unexpected weight change. Negative for appetite change, chills, diaphoresis and fever.   HENT:  Negative for congestion, dental problem, facial swelling, nosebleeds, sinus pressure, sinus pain, sore throat and trouble swallowing.    Eyes:  Negative for pain and redness.   Respiratory:  Positive for shortness of breath. Negative for apnea, cough, chest tightness and wheezing.    Cardiovascular:  Negative for chest pain, palpitations and leg swelling.   Gastrointestinal:  Negative for abdominal distention, abdominal pain, anal bleeding, blood in stool, constipation, diarrhea, nausea and vomiting.   Endocrine: Negative for polyphagia and polyuria.   Genitourinary:  Negative for decreased urine volume, difficulty urinating, dysuria, frequency, hematuria and urgency.   Musculoskeletal:   Negative for back pain, gait problem, joint swelling, myalgias, neck pain and neck stiffness.   Skin:  Negative for color change, pallor, rash and wound.   Neurological:  Negative for dizziness, seizures, syncope, facial asymmetry, speech difficulty, weakness, light-headedness, numbness and headaches.   Psychiatric/Behavioral:  Negative for agitation, behavioral problems, confusion, hallucinations, sleep disturbance and suicidal ideas.      Objective:     Vital Signs (Most Recent):  Temp: 98.7 °F (37.1 °C) (08/20/23 1748)  Pulse: 79 (08/20/23 2028)  Resp: (!) 21 (08/20/23 2028)  BP: (!) 164/79 (08/20/23 2028)  SpO2: 95 % (08/20/23 2018) Vital Signs (24h Range):  Temp:  [98.7 °F (37.1 °C)] 98.7 °F (37.1 °C)  Pulse:  [79-86] 79  Resp:  [20-25] 21  SpO2:  [94 %-98 %] 95 %  BP: (159-168)/() 164/79     Weight: 129.3 kg (285 lb)  Body mass index is 44.64 kg/m².     Physical Exam  Vitals reviewed.   Constitutional:       General: She is not in acute distress.     Appearance: Normal appearance. She is obese. She is not ill-appearing, toxic-appearing or diaphoretic.   HENT:      Head: Normocephalic.      Right Ear: External ear normal.      Left Ear: External ear normal.      Nose: No congestion or rhinorrhea.      Mouth/Throat:      Mouth: Mucous membranes are moist.      Pharynx: Oropharynx is clear. No oropharyngeal exudate or posterior oropharyngeal erythema.   Eyes:      Extraocular Movements: Extraocular movements intact.      Conjunctiva/sclera: Conjunctivae normal.      Pupils: Pupils are equal, round, and reactive to light.   Cardiovascular:      Rate and Rhythm: Normal rate and regular rhythm.      Pulses: Normal pulses.      Heart sounds: Normal heart sounds.   Pulmonary:      Effort: Pulmonary effort is normal.      Breath sounds: Normal breath sounds.   Abdominal:      General: Abdomen is flat. Bowel sounds are normal.      Palpations: Abdomen is soft.   Genitourinary:     Rectum: Normal.    Musculoskeletal:         General: Normal range of motion.      Cervical back: Normal range of motion and neck supple.      Right lower leg: Edema present.      Left lower leg: Edema present.   Skin:     General: Skin is warm and dry.      Capillary Refill: Capillary refill takes less than 2 seconds.   Neurological:      Mental Status: She is alert and oriented to person, place, and time.   Psychiatric:         Mood and Affect: Mood normal.              CRANIAL NERVES     CN III, IV, VI   Pupils are equal, round, and reactive to light.       Significant Labs: All pertinent labs within the past 24 hours have been reviewed.    Significant Imaging: I have reviewed all pertinent imaging results/findings within the past 24 hours.    Assessment/Plan:     * Congestive heart failure  Patient is identified as having Diastolic (HFpEF) heart failure that is Chronic. CHF is currently uncontrolled due to Continued edema of extremities and Weight gain of 10 pounds, Dyspnea not returned to baseline after 1 doses of IV diuretic and Pulmonary edema/pleural effusion on CXR. Latest ECHO performed and demonstrates- Results for orders placed during the hospital encounter of 11/19/21    Echo    Interpretation Summary  · The estimated ejection fraction is 35-40%.  · The left ventricle is moderately enlarged with moderate concentric hypertrophy  · Severe left atrial enlargement.  · Normal right ventricular size with normal right ventricular systolic function.  · Mild-to-moderate mitral regurgitation.  · Mild tricuspid regurgitation.  · The estimated PA systolic pressure is 44 mmHg.  · There is pulmonary hypertension.  . Continue Beta Blocker, Furosemide and Nitrate/Vasodilator and monitor clinical status closely. Monitor on telemetry. Patient is on CHF pathway.  Monitor strict Is&Os and daily weights.  Place on fluid restriction of 1.5 L. Cardiology has not been any consulted. Continue to stress to patient importance of self efficacy and  " on diet for CHF. Last BNP reviewed- and noted below   Recent Labs   Lab 08/20/23  1814   *   .  Echo   IV Lasix 60 mg ordered in ED. order IV Lasix 40 mg in a.m. then resume p.o. Lasix as ordered  Strict I&Os daily weights  Chest x-ray two-view a.m.  Trend troponin    Hypertensive urgency  Initial blood pressure 168/104  Will aim for controlled BP reduction by medications noted above. Monitor and mitigate end organ damage as indicated.  Continue home medication Coreg, clonidine, IV labetalol p.r.n.    Chronic renal failure (CRF), stage 4 (severe)  Creatinine 3.0, BUN 56, GFR 16.1  Followed by Nephrology outpatient we will consult due to chronic renal failure, uncontrolled BP, increased fluid retention  Continue Lasix, Aldactone    Paroxysmal atrial fibrillation  Sinus rhythm  Continue Eliquis 5 mg b.i.d.    Obesity, Class III, BMI 40-49.9 (morbid obesity)  Body mass index is 44.42 kg/m². Morbid obesity complicates all aspects of disease management from diagnostic modalities to treatment. Weight loss encouraged and health benefits explained to patient.     Nutritional consult    S/P coronary artery stent placement  Managed outpatient with Dr Pedersen cardiology      NICOLE (obstructive sleep apnea)  CPAP HS      Type 2 diabetes mellitus with microalbuminuria, without long-term current use of insulin  Patient's FSGs are controlled on current medication regimen.  Last A1c reviewed-   Lab Results   Component Value Date    HGBA1C 5.6 07/05/2023     Most recent fingerstick glucose reviewed- No results for input(s): "POCTGLUCOSE" in the last 24 hours.  Current correctional scale  Low  Maintain anti-hyperglycemic dose as follows-   Antihyperglycemics (From admission, onward)    None      SSI/POCT  Hold Oral hypoglycemics while patient is in the hospital.    Coronary artery disease  Initial troponin 34.4, 32.3 trend troponin  Managed outpatient with Cardiology        VTE Risk Mitigation (From admission, onward)        "  Ordered     apixaban tablet 5 mg  2 times daily         08/20/23 225     IP VTE HIGH RISK PATIENT  Once         08/20/23 2042     Place sequential compression device  Until discontinued         08/20/23 2042                     On 08/21/2023, patient should be placed in hospital observation services under my care in collaboration with Dr. Franklin.      JONTAHAN Burger  Department of Hospital Medicine  [facility]

## 2023-08-21 NOTE — CONSULTS
Consult noted for diet education. Patient fairly compliant with diet at home. Follows low Na and ADA diet recommendations. Questions answered and nutrition care discussed with patient. Thanks for consult!     Veronica Garcia RD 03/19/2022 11:10 AM       Pt educated on 3/19/2022 by Veronica Marrufo RDN      Pt was educated on Low Sodium, diabetic and Healthy Heart diet for CHF, diabetes and HLD.

## 2023-08-21 NOTE — PLAN OF CARE
[facility]  Initial Discharge Assessment       Primary Care Provider: Tony Sadler MD    Admission Diagnosis: Acute systolic congestive heart failure [I50.21]    Admission Date: 8/20/2023  Expected Discharge Date: 8/21/2023    Transition of Care Barriers: None     met with Pt at bedside to complete discharge assessment. Pt AAOx4s. Demographics, PCP, and insurance verified. No home health. No dialysis. Pt reports ability to complete ADLs without assistance. Pt verbalized plan to discharge home via family transport. Pt requested financial resources. Pt has no other needs to be addressed at this time. CM will continue to follow.    Payor: MEDICARE / Plan: MEDICARE PART A & B / Product Type: Government /     Extended Emergency Contact Information  Primary Emergency Contact: Maria Luisa López   [place]  Mobile Phone: [phone]  Relation: Daughter    Discharge Plan A: Home with family  Discharge Plan B: Home with family      [id number] - [place], [place] - [address]  [address]  [facility] [address]  Phone: [phone] Fax: [phone]    [facility] - [place], [place] - [address]  [address]  [facility] [address]  Phone: [phone] Fax: [phone]    [id number] - [place], [place] - [address]  [address]  [place] [address]  Phone: [phone] Fax: [phone]    [facility] - [place] - [address]  [address]  [address]  [facility] [address]  Phone: [phone] Fax: [phone]    [id number] - [place], [place] - [address]  [address] [address]  Phone: [phone] Fax: [phone]      Initial Assessment (most recent)       Adult Discharge Assessment - 08/21/23 1214          Discharge Assessment    Assessment Type Discharge Planning Assessment     Confirmed/corrected address, phone number and insurance Yes     Confirmed  Demographics Correct on Facesheet     Source of Information patient     Does patient/caregiver understand observation status Yes     Communicated HARINI with patient/caregiver Date not available/Unable to determine     Reason For Admission Congestive heart failure     People in Home child(stan), adult     Facility Arrived From: home     Do you expect to return to your current living situation? Yes     Do you have help at home or someone to help you manage your care at home? No     Prior to hospitilization cognitive status: Unable to Assess     Current cognitive status: Alert/Oriented     Walking or Climbing Stairs ambulation difficulty, requires equipment     Mobility Management cane     Equipment Currently Used at Home CPAP;cane, straight     Readmission within 30 days? No     Do you currently have service(s) that help you manage your care at home? No     Do you take prescription medications? Yes     Do you have prescription coverage? Yes     Coverage MEDICARE - MEDICARE PART A & B     Do you have any problems affording any of your prescribed medications? No     Is the patient taking medications as prescribed? yes     Who is going to help you get home at discharge? Maria Luisa López (Daughter)   [phone] (Mobile)     How do you get to doctors appointments? car, drives self     Are you on dialysis? No     Do you take coumadin? No   eliquis    DME Needed Upon Discharge  none     Discharge Plan discussed with: Patient;Adult children     Transition of Care Barriers None     Discharge Plan A Home with family     Discharge Plan B Home with family

## 2023-08-21 NOTE — ASSESSMENT & PLAN NOTE
"Patient's FSGs are controlled on current medication regimen.  Last A1c reviewed-   Lab Results   Component Value Date    HGBA1C 5.6 07/05/2023     Most recent fingerstick glucose reviewed- No results for input(s): "POCTGLUCOSE" in the last 24 hours.  Current correctional scale  Low  Maintain anti-hyperglycemic dose as follows-   Antihyperglycemics (From admission, onward)    None      SSI/POCT  Hold Oral hypoglycemics while patient is in the hospital.  "

## 2023-08-21 NOTE — HPI
Patient is a 73-year-old  female with history of combined heart failure, diabetes mellitus, CKD, paroxysmal atrial fibrillation on Eliquis, CAD (followed by Dr. Pedersen), nonrheumatic mitral regurgitation, pulmonary hypertension, hypertension, hyperlipidemia - intolerant to statins, hypothyroidism, CKD (followed by Dr. Martinez), right renal carcinoma - status post right nephrectomy on 03/10/2022. She presents to the ED with complaints of increased shortness of breath on exertion, intermittent chronic chest discomfort, lower leg edema and hypertension.  On assessment in ED initial blood pressure 168/104, creatinine 3.0, BUN 56, GFR 16.4, troponin 34.0, 32.3, .  IV Lasix 60 mg 1 time dose ordered in ED.    On assessment patient is alert oriented x4.  She reports chronic intermittent chest discomfort however states that it is her main complaint.  She reports her main complaint was the increased weight gain in the last 2 weeks, lower leg edema, increased shortness of breath with exertion and at rest, increased fatigue and weakness.  She reports monitoring her blood pressure at home and noticed increase with SBP >200 and DBP >120 for last 5 days.  She reports increased 10-20 lb weight gain within the last couple of months.  She denies dysuria.  She reports use of spironolactone, and Lasix on alternating days.  Last dose of Lasix 1 day ago.  She reports Nephrology spoke with her regarding dialysis possible near future.  She reports compliance with Eliquis daily however denies use of amiodarone, and Calcitrol.  She denies lightheaded, dizziness, nausea, vomiting, dysuria, hematuria, hematochezia, fever, chills.  She denies smoking, illicit drugs, alcohol.    Hospitalist asked to admit for hypertensive urgency, CHF exacerbation, Consult Nephrology.  Discussed  Plan with attending    Impression:  Chest x-ray one view 8/20/23  There is mild interstitial prominence which can be due to edema and/or low lung  volumes     Last echo  Summary 11/19/2021    The estimated ejection fraction is 35-40%.  The left ventricle is moderately enlarged with moderate concentric hypertrophy  Severe left atrial enlargement.  Normal right ventricular size with normal right ventricular systolic function.  Mild-to-moderate mitral regurgitation.  Mild tricuspid regurgitation.  The estimated PA systolic pressure is 44 mmHg.  There is pulmonary hypertension.

## 2023-08-21 NOTE — ASSESSMENT & PLAN NOTE
Initial blood pressure 168/104  Will aim for controlled BP reduction by medications noted above. Monitor and mitigate end organ damage as indicated.  Continue home medication Coreg, clonidine, IV labetalol p.r.n.

## 2023-08-21 NOTE — PHARMACY MED REC
"Admission Medication History     The home medication history was taken by Denia Roque.    You may go to "Admission" then "Reconcile Home Medications" tabs to review and/or act upon these items.     The home medication list has been updated by the Pharmacy department.   Please read ALL comments highlighted in yellow.   Please address this information as you see fit.    Feel free to contact us if you have any questions or require assistance.      Medications listed below were obtained from: Patient/family and Analytic software- [employer]  No current facility-administered medications on file prior to encounter.     Current Outpatient Medications on File Prior to Encounter   Medication Sig Dispense Refill    allopurinoL (ZYLOPRIM) 300 MG tablet Take 1 tablet (300 mg total) by mouth once daily. 90 tablet 3    carvediloL (COREG) 25 MG tablet TAKE ONE TABLET BY MOUTH TWICE DAILY 180 tablet 2    cloNIDine (CATAPRES) 0.1 MG tablet Take 1 tablet (0.1 mg total) by mouth 3 (three) times daily as needed (PRN SBP > 165 mmHg). 90 tablet 6    ELIQUIS 5 mg Tab TAKE ONE TABLET BY MOUTH TWICE DAILY 60 tablet 4    furosemide (LASIX) 40 MG tablet Take 40 mg by mouth once daily.      glipiZIDE 5 MG TR24 Take 2 tablets (10 mg total) by mouth daily with breakfast. (Patient taking differently: Take 5 mg by mouth daily with breakfast.) 180 tablet 3    latanoprost 0.005 % ophthalmic solution Place 1 drop into both eyes every evening. 7.5 mL 3    loratadine (CLARITIN) 10 mg tablet Take 10 mg by mouth once daily.      magnesium oxide (MAG-OX) 400 mg (241.3 mg magnesium) tablet Take 1 tablet (400 mg total) by mouth daily as needed (cramping). 30 tablet 0    montelukast (SINGULAIR) 10 mg tablet TAKE ONE TABLET BY MOUTH EVERY EVENING FOR allergies 90 tablet 3    PRALUENT PEN 75 mg/mL PnIj Inject 1 mL (75 mg total) into the skin every 14 (fourteen) days. 6 mL 3    spironolactone (ALDACTONE) 25 MG tablet Take 0.5 tablets (12.5 mg total) by mouth " once daily. 30 tablet 11    blood sugar diagnostic (TRUE METRIX GLUCOSE TEST STRIP) Strp Use 1x daily. Insurance preferred. 100 strip 3    blood sugar diagnostic Strp To check BG 1 time daily, to use with insurance preferred meter 100 strip 3    blood-glucose meter kit To check BG 1 times daily, to use with insurance preferred meter 1 each 0    cloNIDine 0.1 mg/24 hr td ptwk (CATAPRES) 0.1 mg/24 hr Place 1 patch onto the skin every 7 days. 4 patch 11    famotidine (PEPCID) 20 MG tablet Take 1 tablet (20 mg total) by mouth once daily. 30 tablet 11    lancets Misc To check BG 1 times daily, to use with insurance preferred meter 100 each 3    lancets Misc To check BG 1 times daily, to use with insurance preferred meter 100 each 3    leg brace Misc Leg brace: Ossor Unloaded OneX for lateral unloading, right leg 1 each 0    nitroGLYCERIN (NITROSTAT) 0.4 MG SL tablet Place 1 tablet (0.4 mg total) under the tongue every 5 (five) minutes as needed for Chest pain. 25 tablet 0    traMADoL (ULTRAM) 50 mg tablet Take by mouth.      [DISCONTINUED] aspirin (ECOTRIN) 81 MG EC tablet Take 1 tablet (81 mg total) by mouth once daily. 90 tablet 3    [DISCONTINUED] DULoxetine (CYMBALTA) 20 MG capsule TAKE ONE CAPSULE BY MOUTH ONCE DAILY 30 capsule 4    [DISCONTINUED] folic acid (FOLVITE) 1 MG tablet Take 1 mg by mouth once daily.      [DISCONTINUED] metFORMIN (GLUCOPHAGE-XR) 500 MG ER 24hr tablet Take 2 tablets (1,000 mg total) by mouth 2 (two) times daily with meals. 360 tablet 3    [DISCONTINUED] sodium bicarbonate 650 MG tablet Take 1 tablet (650 mg total) by mouth once daily. 30 tablet 11       Potential issues to be addressed PRIOR TO DISCHARGE  Patient reported not taking the following medications: (Tramadol 50). These medications remain on the home medication list. Please address accordingly.     Denia Roque  [id number]                .

## 2023-08-21 NOTE — NURSING
Nurses Note -- 4 Eyes      8/21/2023   12:57 AM      Skin assessed during: Admit      [x] No Altered Skin Integrity Present    []Prevention Measures Documented      [] Yes- Altered Skin Integrity Present or Discovered   [] LDA Added if Not in Epic (Describe Wound)   [] New Altered Skin Integrity was Present on Admit and Documented in LDA   [] Wound Image Taken    Wound Care Consulted? No    Attending Nurse:  Coby Strickland RN/Staff Member:  [id number]

## 2023-08-21 NOTE — ASSESSMENT & PLAN NOTE
Body mass index is 44.42 kg/m². Morbid obesity complicates all aspects of disease management from diagnostic modalities to treatment. Weight loss encouraged and health benefits explained to patient.     Nutritional consult

## 2023-08-21 NOTE — ASSESSMENT & PLAN NOTE
Patient is identified as having Diastolic (HFpEF) heart failure that is Chronic. CHF is currently uncontrolled due to Continued edema of extremities and Weight gain of 10 pounds, Dyspnea not returned to baseline after 1 doses of IV diuretic and Pulmonary edema/pleural effusion on CXR. Latest ECHO performed and demonstrates- Results for orders placed during the hospital encounter of 11/19/21    Echo    Interpretation Summary  · The estimated ejection fraction is 35-40%.  · The left ventricle is moderately enlarged with moderate concentric hypertrophy  · Severe left atrial enlargement.  · Normal right ventricular size with normal right ventricular systolic function.  · Mild-to-moderate mitral regurgitation.  · Mild tricuspid regurgitation.  · The estimated PA systolic pressure is 44 mmHg.  · There is pulmonary hypertension.  . Continue Beta Blocker, Furosemide and Nitrate/Vasodilator and monitor clinical status closely. Monitor on telemetry. Patient is on CHF pathway.  Monitor strict Is&Os and daily weights.  Place on fluid restriction of 1.5 L. Cardiology has not been any consulted. Continue to stress to patient importance of self efficacy and  on diet for CHF. Last BNP reviewed- and noted below   Recent Labs   Lab 08/20/23  1814   *   .  Echo   IV Lasix 60 mg ordered in ED. order IV Lasix 40 mg in a.m. then resume p.o. Lasix as ordered  Strict I&Os daily weights  Chest x-ray two-view a.m.  Trend troponin

## 2023-08-21 NOTE — PLAN OF CARE
Pt was explained ESPINOZA. Pt verbalized understanding of ESPINOZA and signed. ESPINOZA scanned to .     08/21/23 1222   ESPINOZA Message   Medicare Outpatient and Observation Notification regarding financial responsibility Given to patient/caregiver;Explained to patient/caregiver;Signed/date by patient/caregiver   Date ESPINOZA was signed 08/21/23   Time ESPINOZA was signed [id number]

## 2023-08-21 NOTE — ASSESSMENT & PLAN NOTE
Creatinine 3.0, BUN 56, GFR 16.1  Followed by Nephrology outpatient we will consult due to chronic renal failure, uncontrolled BP, increased fluid retention  Continue Lasix, Aldactone

## 2023-08-21 NOTE — CONSULTS
INPATIENT NEPHROLOGY CONSULT   [facility] NEPHROLOGY    Juhi Taylor  08/21/2023    Reason for consultation:    Acute kidney injury    Chief Complaint:   Chief Complaint   Patient presents with    Shortness of Breath     OFF/ON X 1 WEEK    Hypertension     X 1 WEEK          History of Present Illness:    Per H and P      Patient is a 73-year-old  female with history of combined heart failure, diabetes mellitus, CKD, paroxysmal atrial fibrillation on Eliquis, CAD (followed by Dr. Pedersen), nonrheumatic mitral regurgitation, pulmonary hypertension, hypertension, hyperlipidemia - intolerant to statins, hypothyroidism, CKD (followed by Dr. Martinez), right renal carcinoma - status post right nephrectomy on 03/10/2022. She presents to the ED with complaints of increased shortness of breath on exertion, intermittent chronic chest discomfort, lower leg edema and hypertension.  On assessment in ED initial blood pressure 168/104, creatinine 3.0, BUN 56, GFR 16.4, troponin 34.0, 32.3, .  IV Lasix 60 mg 1 time dose ordered in ED.     On assessment patient is alert oriented x4.  She reports chronic intermittent chest discomfort however states that it is her main complaint.  She reports her main complaint was the increased weight gain in the last 2 weeks, lower leg edema, increased shortness of breath with exertion and at rest, increased fatigue and weakness.  She reports monitoring her blood pressure at home and noticed increase with SBP >200 and DBP >120 for last 5 days.  She reports increased 10-20 lb weight gain within the last couple of months.  She denies dysuria.  She reports use of spironolactone, and Lasix on alternating days.  Last dose of Lasix 1 day ago.  She reports Nephrology spoke with her regarding dialysis possible near future.  She reports compliance with Eliquis daily however denies use of amiodarone, and Calcitrol.  She denies lightheaded, dizziness, nausea, vomiting, dysuria,  hematuria, hematochezia, fever, chills.  She denies smoking, illicit drugs, alcohol.     8/21  AFVSS. I/Os not recorded.  Feels better this am.  No cp and sob improved.        Plan of Care:       Assessment:    Acute kidney injury secondary to diuresis and cardiorenal syndrome type 1  --strict I/Os  --ua with micro  --hold diuretic (CR 3.0-->3.6)  --Avoid NSAIDS, Hidalgo II inhibitors, and other non-essential nephrotoxic agents  --Keep map above 55  --hold metformin  --monitor k closely  --risk of need for HD d/w patient    Anemia  --iron panel  --hold PONCE while hypervolemic    Metabolic acidosis  --mild, no intervention needed    Secondary hyperparathyroidism  --low phosphorus diet  --intolerant to [place]  --will check pth and vit d level as outpatient             Thank you for allowing us to participate in this patient's care. We will continue to follow.    Vital Signs:  Temp Readings from Last 3 Encounters:   08/21/23 98.4 °F (36.9 °C) (Oral)   07/10/23 98.2 °F (36.8 °C) (Oral)   03/10/23 98.5 °F (36.9 °C) (Oral)       Pulse Readings from Last 3 Encounters:   08/21/23 81   07/10/23 80   06/26/23 (!) 58       BP Readings from Last 3 Encounters:   08/21/23 (!) 144/73   07/10/23 (!) 140/80   06/26/23 (!) 158/82       Weight:  Wt Readings from Last 3 Encounters:   08/20/23 128.7 kg (283 lb 10 oz)   08/21/23 128.7 kg (283 lb 11.7 oz)   07/10/23 126.9 kg (279 lb 10.5 oz)       Past Medical & Surgical History:  Past Medical History:   Diagnosis Date    Anticoagulant long-term use     Arthritis     Breast cancer 2014    invasive lobular carcinoma    Cancer of kidney 11/2020    RIGHT KIDNEY CANCER    CHF (congestive heart failure)     Coronary artery disease dx 2005    Depression     Diabetes mellitus     Diastolic heart failure secondary to hypertension     Gout     Hyperlipemia     Hypertension     Hypertrophy of nasal turbinates     Kidney mass 2020    Right    Levoscoliosis     Lung nodule     left    Multiple thyroid  "nodules     NICOLE (obstructive sleep apnea)     uses C-PAP    Pulmonary hypertension        Past Surgical History:   Procedure Laterality Date    AORTOGRAPHY N/A 2020    Procedure: Aortogram;  Surgeon: Paul Pedersen MD;  Location: [facility];  Service: Cardiology;  Laterality: N/A;    BREAST SURGERY      CARDIAC CATHETERIZATION  2020    CHOLECYSTECTOMY      COLONOSCOPY      multi -last      CORONARY ARTERY BYPASS GRAFT      ESOPHAGOGASTRODUODENOSCOPY      2012     FRACTURE SURGERY      HAND SURGERY Right     HYSTERECTOMY      LAPAROSCOPIC ROBOT-ASSISTED SURGICAL REMOVAL OF KIDNEY USING DA CHELLE XI Right 03/10/2022    Procedure: XI ROBOTIC NEPHRECTOMY- radical;  Surgeon: Rolando Ramirez MD;  Location: [facility] OR;  Service: Urology;  Laterality: Right;    MASTECTOMY W/ SENTINEL NODE BIOPSY Bilateral 2015    bilateral "dog ears"    NASAL SINUS SURGERY      Dr Bryant FESS/cauterization turbinate     PARTIAL HYSTERECTOMY      PERCUTANEOUS TRANSLUMINAL BALLOON ANGIOPLASTY OF CORONARY ARTERY  2020    Procedure: Angioplasty-coronary;  Surgeon: Paul Pedersen MD;  [facility];  Service: Cardiology;;    RENAL BIOPSY Right     2021 EJ    TUBAL LIGATION      ULTRASOUND GUIDANCE  2020    Procedure: Ultrasound Guidance;  Surgeon: Paul Pedersen MD;  Location: [facility]ATH;  Service: Cardiology;;       Past Social History:  Social History     Socioeconomic History    Marital status: Single    Number of children: 4   Occupational History    Occupation: retired Psych aid    Tobacco Use    Smoking status: Former     Current packs/day: 0.00     Types: Cigarettes     Start date: 2016     Quit date: 2016     Years since quittin.6    Smokeless tobacco: Never    Tobacco comments:     quit    Substance and Sexual Activity    Alcohol use: No    Drug use: No    Sexual activity: Not Currently     Partners: Male     Birth control/protection: None       Medications:  No " current facility-administered medications on file prior to encounter.     Current Outpatient Medications on File Prior to Encounter   Medication Sig Dispense Refill    allopurinoL (ZYLOPRIM) 300 MG tablet Take 1 tablet (300 mg total) by mouth once daily. 90 tablet 3    carvediloL (COREG) 25 MG tablet TAKE ONE TABLET BY MOUTH TWICE DAILY 180 tablet 2    cloNIDine (CATAPRES) 0.1 MG tablet Take 1 tablet (0.1 mg total) by mouth 3 (three) times daily as needed (PRN SBP > 165 mmHg). 90 tablet 6    ELIQUIS 5 mg Tab TAKE ONE TABLET BY MOUTH TWICE DAILY 60 tablet 4    furosemide (LASIX) 40 MG tablet Take 40 mg by mouth once daily.      glipiZIDE 5 MG TR24 Take 2 tablets (10 mg total) by mouth daily with breakfast. (Patient taking differently: Take 5 mg by mouth daily with breakfast.) 180 tablet 3    latanoprost 0.005 % ophthalmic solution Place 1 drop into both eyes every evening. 7.5 mL 3    loratadine (CLARITIN) 10 mg tablet Take 10 mg by mouth once daily.      magnesium oxide (MAG-OX) 400 mg (241.3 mg magnesium) tablet Take 1 tablet (400 mg total) by mouth daily as needed (cramping). 30 tablet 0    montelukast (SINGULAIR) 10 mg tablet TAKE ONE TABLET BY MOUTH EVERY EVENING FOR allergies 90 tablet 3    PRALUENT PEN 75 mg/mL PnIj Inject 1 mL (75 mg total) into the skin every 14 (fourteen) days. 6 mL 3    spironolactone (ALDACTONE) 25 MG tablet Take 0.5 tablets (12.5 mg total) by mouth once daily. 30 tablet 11    blood sugar diagnostic (TRUE METRIX GLUCOSE TEST STRIP) Strp Use 1x daily. Insurance preferred. 100 strip 3    blood sugar diagnostic Strp To check BG 1 time daily, to use with insurance preferred meter 100 strip 3    blood-glucose meter kit To check BG 1 times daily, to use with insurance preferred meter 1 each 0    cloNIDine 0.1 mg/24 hr td ptwk (CATAPRES) 0.1 mg/24 hr Place 1 patch onto the skin every 7 days. 4 patch 11    famotidine (PEPCID) 20 MG tablet Take 1 tablet (20 mg total) by mouth once daily. 30 tablet  11    lancets [facility] To check BG 1 times daily, to use with insurance preferred meter 100 each 3    lancets Misc To check BG 1 times daily, to use with insurance preferred meter 100 each 3    leg brace Misc Leg brace: Ossor Unloaded OneX for lateral unloading, right leg 1 each 0    nitroGLYCERIN (NITROSTAT) 0.4 MG SL tablet Place 1 tablet (0.4 mg total) under the tongue every 5 (five) minutes as needed for Chest pain. 25 tablet 0    traMADoL (ULTRAM) 50 mg tablet Take by mouth.      [DISCONTINUED] aspirin (ECOTRIN) 81 MG EC tablet Take 1 tablet (81 mg total) by mouth once daily. 90 tablet 3    [DISCONTINUED] DULoxetine (CYMBALTA) 20 MG capsule TAKE ONE CAPSULE BY MOUTH ONCE DAILY 30 capsule 4    [DISCONTINUED] folic acid (FOLVITE) 1 MG tablet Take 1 mg by mouth once daily.      [DISCONTINUED] metFORMIN (GLUCOPHAGE-XR) 500 MG ER 24hr tablet Take 2 tablets (1,000 mg total) by mouth 2 (two) times daily with meals. 360 tablet 3    [DISCONTINUED] sodium bicarbonate 650 MG tablet Take 1 tablet (650 mg total) by mouth once daily. 30 tablet 11     Scheduled Meds:   allopurinoL  300 mg Oral Daily    apixaban  5 mg Oral BID    carvediloL  25 mg Oral BID    cloNIDine 0.1 mg/24 hr td ptwk  1 patch Transdermal Q7 Days    furosemide (LASIX) injection  80 mg Intravenous Q12H    latanoprost  1 drop Both Eyes QHS    montelukast  10 mg Oral Daily    senna-docusate 8.6-50 mg  1 tablet Oral BID    spironolactone  12.5 mg Oral Daily     Continuous Infusions:  PRN Meds:.acetaminophen, acetaminophen, cloNIDine, dextrose 50%, dextrose 50%, glucagon (human recombinant), glucose, glucose, insulin aspart U-100, labetalol, magnesium oxide, magnesium oxide, magnesium oxide, melatonin, nitroGLYCERIN, ondansetron, polyethylene glycol, potassium bicarbonate, potassium bicarbonate, potassium bicarbonate, potassium, sodium phosphates, potassium, sodium phosphates, potassium, sodium phosphates, prochlorperazine, sodium chloride  "0.9%    Allergies:  Percocet [oxycodone-acetaminophen], Amiodarone analogues, Irbesartan, Januvia [sitagliptin], Jardiance [empagliflozin], Lipitor [atorvastatin], Linaclotide, and Lubiprostone    Past Family History:  Reviewed; refer to Hospitalist Admission Note    Review of Systems:  Review of Systems - All 14 systems reviewed and negative, except as noted in HPI    Physical Exam:    BP (!) 144/73 (BP Location: Right arm, Patient Position: Sitting)   Pulse 81   Temp 98.4 °F (36.9 °C) (Oral)   Resp 15   Ht 5' 7" (1.702 m)   Wt 128.7 kg (283 lb 10 oz)   SpO2 96%   BMI 44.42 kg/m²     General Appearance:    Alert, cooperative, no distress, appears stated age   Head:    Normocephalic, without obvious abnormality, atraumatic   Eyes:    PER, conjunctiva/corneas clear, EOM's intact in both eyes        Throat:   Lips, mucosa, and tongue normal; teeth and gums normal   Back:     Symmetric, no curvature, ROM normal, no CVA tenderness   Lungs:     Clear to auscultation bilaterally, respirations unlabored   Chest wall:    No tenderness or deformity   Heart:    Regular rate and rhythm, S1 and S2 normal, no murmur, rub   or gallop   Abdomen:     Soft, non-tender, bowel sounds active all four quadrants,     no masses, no organomegaly   Extremities:   Extremities normal, atraumatic, no cyanosis or edema   Pulses:   2+ and symmetric all extremities   MSK:   No joint or muscle swelling, tenderness or deformity   Skin:   Skin color, texture, turgor normal, no rashes or lesions   Neurologic:   CNII-XII intact, normal strength and sensation       Throughout.  No flap     Results:  Lab Results   Component Value Date     08/21/2023    K 4.6 08/21/2023     08/21/2023    CO2 20 (L) 08/21/2023    BUN 69 (H) 08/21/2023    CREATININE 3.6 (H) 08/21/2023    CALCIUM 9.3 08/21/2023    ANIONGAP 12 08/21/2023    ESTGFRAFRICA 16.2 (A) 07/15/2022    EGFRNONAA 14.0 (A) 07/15/2022       Lab Results   Component Value Date    CALCIUM " 9.3 08/21/2023    PHOS 3.7 07/14/2023       Recent Labs   Lab 08/21/23  0333   WBC 8.48   RBC 4.13   HGB 10.9*   HCT 36.5*      MCV 88   MCH 26.4*   MCHC 29.9*          I have personally reviewed pertinent radiological imaging and reports.

## 2023-08-22 ENCOUNTER — PATIENT MESSAGE (OUTPATIENT)
Dept: PRIMARY CARE CLINIC | Facility: CLINIC | Age: 73
End: 2023-08-22
Payer: MEDICARE

## 2023-08-22 VITALS
TEMPERATURE: 99 F | WEIGHT: 283.63 LBS | HEART RATE: 77 BPM | BODY MASS INDEX: 44.52 KG/M2 | OXYGEN SATURATION: 96 % | HEIGHT: 67 IN | DIASTOLIC BLOOD PRESSURE: 63 MMHG | RESPIRATION RATE: 18 BRPM | SYSTOLIC BLOOD PRESSURE: 157 MMHG

## 2023-08-22 PROBLEM — I16.0 HYPERTENSIVE URGENCY: Status: RESOLVED | Noted: 2023-08-21 | Resolved: 2023-08-22

## 2023-08-22 LAB
ALBUMIN SERPL BCP-MCNC: 3.1 G/DL (ref 3.5–5.2)
ALBUMIN SERPL BCP-MCNC: 3.1 G/DL (ref 3.5–5.2)
ALP SERPL-CCNC: 90 U/L (ref 55–135)
ALT SERPL W/O P-5'-P-CCNC: 15 U/L (ref 10–44)
ANION GAP SERPL CALC-SCNC: 6 MMOL/L (ref 8–16)
ANION GAP SERPL CALC-SCNC: 6 MMOL/L (ref 8–16)
AORTIC ROOT ANNULUS: 3.7 CM
AORTIC VALVE CUSP SEPERATION: 1.9 CM
AST SERPL-CCNC: 15 U/L (ref 10–40)
AV INDEX (PROSTH): 0.61
AV MEAN GRADIENT: 5 MMHG
AV PEAK GRADIENT: 9 MMHG
AV VALVE AREA BY VELOCITY RATIO: 3.06 CM²
AV VALVE AREA: 3.02 CM²
AV VELOCITY RATIO: 0.62
BASOPHILS # BLD AUTO: 0.03 K/UL (ref 0–0.2)
BASOPHILS # BLD AUTO: 0.03 K/UL (ref 0–0.2)
BASOPHILS NFR BLD: 0.4 % (ref 0–1.9)
BASOPHILS NFR BLD: 0.4 % (ref 0–1.9)
BILIRUB SERPL-MCNC: 0.4 MG/DL (ref 0.1–1)
BSA FOR ECHO PROCEDURE: 2.47 M2
BUN SERPL-MCNC: 66 MG/DL (ref 8–23)
BUN SERPL-MCNC: 66 MG/DL (ref 8–23)
CALCIUM SERPL-MCNC: 9.2 MG/DL (ref 8.7–10.5)
CALCIUM SERPL-MCNC: 9.2 MG/DL (ref 8.7–10.5)
CHLORIDE SERPL-SCNC: 107 MMOL/L (ref 95–110)
CHLORIDE SERPL-SCNC: 107 MMOL/L (ref 95–110)
CO2 SERPL-SCNC: 25 MMOL/L (ref 23–29)
CO2 SERPL-SCNC: 25 MMOL/L (ref 23–29)
CREAT SERPL-MCNC: 3.1 MG/DL (ref 0.5–1.4)
CREAT SERPL-MCNC: 3.1 MG/DL (ref 0.5–1.4)
CV ECHO LV RWT: 0.45 CM
DIFFERENTIAL METHOD: ABNORMAL
DIFFERENTIAL METHOD: ABNORMAL
DOP CALC AO PEAK VEL: 1.46 M/S
DOP CALC AO VTI: 28.3 CM
DOP CALC LVOT AREA: 4.9 CM2
DOP CALC LVOT DIAMETER: 2.5 CM
DOP CALC LVOT PEAK VEL: 0.91 M/S
DOP CALC LVOT STROKE VOLUME: 85.37 CM3
DOP CALC MV VTI: 27.8 CM
DOP CALCLVOT PEAK VEL VTI: 17.4 CM
E WAVE DECELERATION TIME: 243 MSEC
E/A RATIO: 1
E/E' RATIO: 14 M/S
ECHO LV POSTERIOR WALL: 1.33 CM (ref 0.6–1.1)
EOSINOPHIL # BLD AUTO: 0.3 K/UL (ref 0–0.5)
EOSINOPHIL # BLD AUTO: 0.3 K/UL (ref 0–0.5)
EOSINOPHIL NFR BLD: 3.8 % (ref 0–8)
EOSINOPHIL NFR BLD: 3.8 % (ref 0–8)
ERYTHROCYTE [DISTWIDTH] IN BLOOD BY AUTOMATED COUNT: 15.4 % (ref 11.5–14.5)
ERYTHROCYTE [DISTWIDTH] IN BLOOD BY AUTOMATED COUNT: 15.4 % (ref 11.5–14.5)
EST. GFR  (NO RACE VARIABLE): 15.5 ML/MIN/1.73 M^2
EST. GFR  (NO RACE VARIABLE): 15.5 ML/MIN/1.73 M^2
FERRITIN SERPL-MCNC: 67 NG/ML (ref 20–300)
FRACTIONAL SHORTENING: 23 % (ref 28–44)
GLUCOSE SERPL-MCNC: 133 MG/DL (ref 70–110)
GLUCOSE SERPL-MCNC: 133 MG/DL (ref 70–110)
GLUCOSE SERPL-MCNC: 145 MG/DL (ref 70–110)
GLUCOSE SERPL-MCNC: 149 MG/DL (ref 70–110)
HCT VFR BLD AUTO: 32.5 % (ref 37–48.5)
HCT VFR BLD AUTO: 32.5 % (ref 37–48.5)
HGB BLD-MCNC: 10 G/DL (ref 12–16)
HGB BLD-MCNC: 10 G/DL (ref 12–16)
IMM GRANULOCYTES # BLD AUTO: 0.02 K/UL (ref 0–0.04)
IMM GRANULOCYTES # BLD AUTO: 0.02 K/UL (ref 0–0.04)
IMM GRANULOCYTES NFR BLD AUTO: 0.3 % (ref 0–0.5)
IMM GRANULOCYTES NFR BLD AUTO: 0.3 % (ref 0–0.5)
INTERVENTRICULAR SEPTUM: 1.59 CM (ref 0.6–1.1)
IRON SERPL-MCNC: 27 UG/DL (ref 30–160)
IVC DIAMETER: 2.47 CM
LEFT INTERNAL DIMENSION IN SYSTOLE: 4.57 CM (ref 2.1–4)
LEFT VENTRICLE DIASTOLIC VOLUME INDEX: 75.32 ML/M2
LEFT VENTRICLE DIASTOLIC VOLUME: 177 ML
LEFT VENTRICLE MASS INDEX: 173 G/M2
LEFT VENTRICLE SYSTOLIC VOLUME INDEX: 40.8 ML/M2
LEFT VENTRICLE SYSTOLIC VOLUME: 95.9 ML
LEFT VENTRICULAR INTERNAL DIMENSION IN DIASTOLE: 5.95 CM (ref 3.5–6)
LEFT VENTRICULAR MASS: 405.97 G
LV LATERAL E/E' RATIO: 14 M/S
LV SEPTAL E/E' RATIO: 14 M/S
LVOT MG: 2 MMHG
LVOT MV: 0.59 CM/S
LYMPHOCYTES # BLD AUTO: 2.5 K/UL (ref 1–4.8)
LYMPHOCYTES # BLD AUTO: 2.5 K/UL (ref 1–4.8)
LYMPHOCYTES NFR BLD: 31.9 % (ref 18–48)
LYMPHOCYTES NFR BLD: 31.9 % (ref 18–48)
MAGNESIUM SERPL-MCNC: 1.8 MG/DL (ref 1.6–2.6)
MCH RBC QN AUTO: 26.1 PG (ref 27–31)
MCH RBC QN AUTO: 26.1 PG (ref 27–31)
MCHC RBC AUTO-ENTMCNC: 30.8 G/DL (ref 32–36)
MCHC RBC AUTO-ENTMCNC: 30.8 G/DL (ref 32–36)
MCV RBC AUTO: 85 FL (ref 82–98)
MCV RBC AUTO: 85 FL (ref 82–98)
MONOCYTES # BLD AUTO: 0.7 K/UL (ref 0.3–1)
MONOCYTES # BLD AUTO: 0.7 K/UL (ref 0.3–1)
MONOCYTES NFR BLD: 8.5 % (ref 4–15)
MONOCYTES NFR BLD: 8.5 % (ref 4–15)
MV MEAN GRADIENT: 3 MMHG
MV PEAK A VEL: 0.84 M/S
MV PEAK E VEL: 0.84 M/S
MV PEAK GRADIENT: 5 MMHG
MV VALVE AREA BY CONTINUITY EQUATION: 3.07 CM2
NEUTROPHILS # BLD AUTO: 4.3 K/UL (ref 1.8–7.7)
NEUTROPHILS # BLD AUTO: 4.3 K/UL (ref 1.8–7.7)
NEUTROPHILS NFR BLD: 55.1 % (ref 38–73)
NEUTROPHILS NFR BLD: 55.1 % (ref 38–73)
NRBC BLD-RTO: 0 /100 WBC
NRBC BLD-RTO: 0 /100 WBC
PHOSPHATE SERPL-MCNC: 3.9 MG/DL (ref 2.7–4.5)
PISA MRMAX VEL: 1.57 M/S
PISA TR MAX VEL: 2.21 M/S
PLATELET # BLD AUTO: 259 K/UL (ref 150–450)
PLATELET # BLD AUTO: 259 K/UL (ref 150–450)
PMV BLD AUTO: 9.8 FL (ref 9.2–12.9)
PMV BLD AUTO: 9.8 FL (ref 9.2–12.9)
POTASSIUM SERPL-SCNC: 4 MMOL/L (ref 3.5–5.1)
POTASSIUM SERPL-SCNC: 4 MMOL/L (ref 3.5–5.1)
PROT SERPL-MCNC: 6.3 G/DL (ref 6–8.4)
PV MV: 0.74 M/S
PV PEAK GRADIENT: 4 MMHG
PV PEAK VELOCITY: 1.06 M/S
RA PRESSURE ESTIMATED: 3 MMHG
RBC # BLD AUTO: 3.83 M/UL (ref 4–5.4)
RBC # BLD AUTO: 3.83 M/UL (ref 4–5.4)
RIGHT VENTRICULAR END-DIASTOLIC DIMENSION: 3.83 CM
RV TB RVSP: 5 MMHG
RV TISSUE DOPPLER FREE WALL SYSTOLIC VELOCITY 1 (APICAL 4 CHAMBER VIEW): 13.5 CM/S
SATURATED IRON: 8 % (ref 20–50)
SODIUM SERPL-SCNC: 138 MMOL/L (ref 136–145)
SODIUM SERPL-SCNC: 138 MMOL/L (ref 136–145)
TDI LATERAL: 0.06 M/S
TDI SEPTAL: 0.06 M/S
TDI: 0.06 M/S
TOTAL IRON BINDING CAPACITY: 321 UG/DL (ref 250–450)
TR MAX PG: 20 MMHG
TRANSFERRIN SERPL-MCNC: 229 MG/DL (ref 200–375)
TRICUSPID ANNULAR PLANE SYSTOLIC EXCURSION: 3.27 CM
TV REST PULMONARY ARTERY PRESSURE: 23 MMHG
WBC # BLD AUTO: 7.81 K/UL (ref 3.9–12.7)
WBC # BLD AUTO: 7.81 K/UL (ref 3.9–12.7)
Z-SCORE OF LEFT VENTRICULAR DIMENSION IN END DIASTOLE: -5.02
Z-SCORE OF LEFT VENTRICULAR DIMENSION IN END SYSTOLE: -2.03

## 2023-08-22 PROCEDURE — 63600175 PHARM REV CODE 636 W HCPCS: Performed by: INTERNAL MEDICINE

## 2023-08-22 PROCEDURE — 94761 N-INVAS EAR/PLS OXIMETRY MLT: CPT

## 2023-08-22 PROCEDURE — 25000003 PHARM REV CODE 250: Performed by: NURSE PRACTITIONER

## 2023-08-22 PROCEDURE — 83735 ASSAY OF MAGNESIUM: CPT | Performed by: INTERNAL MEDICINE

## 2023-08-22 PROCEDURE — 80069 RENAL FUNCTION PANEL: CPT | Performed by: INTERNAL MEDICINE

## 2023-08-22 PROCEDURE — 85025 COMPLETE CBC W/AUTO DIFF WBC: CPT | Performed by: NURSE PRACTITIONER

## 2023-08-22 PROCEDURE — 36415 COLL VENOUS BLD VENIPUNCTURE: CPT | Performed by: NURSE PRACTITIONER

## 2023-08-22 PROCEDURE — 82728 ASSAY OF FERRITIN: CPT | Performed by: INTERNAL MEDICINE

## 2023-08-22 PROCEDURE — 99900035 HC TECH TIME PER 15 MIN (STAT)

## 2023-08-22 PROCEDURE — 94660 CPAP INITIATION&MGMT: CPT

## 2023-08-22 PROCEDURE — 84466 ASSAY OF TRANSFERRIN: CPT | Performed by: INTERNAL MEDICINE

## 2023-08-22 PROCEDURE — 80053 COMPREHEN METABOLIC PANEL: CPT | Performed by: NURSE PRACTITIONER

## 2023-08-22 RX ORDER — FUROSEMIDE 10 MG/ML
40 INJECTION INTRAMUSCULAR; INTRAVENOUS ONCE
Status: COMPLETED | OUTPATIENT
Start: 2023-08-22 | End: 2023-08-22

## 2023-08-22 RX ADMIN — MONTELUKAST 10 MG: 10 TABLET, FILM COATED ORAL at 09:08

## 2023-08-22 RX ADMIN — FUROSEMIDE 40 MG: 10 INJECTION, SOLUTION INTRAMUSCULAR; INTRAVENOUS at 09:08

## 2023-08-22 RX ADMIN — SENNOSIDES AND DOCUSATE SODIUM 1 TABLET: 50; 8.6 TABLET ORAL at 09:08

## 2023-08-22 RX ADMIN — CARVEDILOL 25 MG: 25 TABLET, FILM COATED ORAL at 09:08

## 2023-08-22 RX ADMIN — APIXABAN 5 MG: 5 TABLET, FILM COATED ORAL at 08:08

## 2023-08-22 RX ADMIN — Medication 400 MG: at 11:08

## 2023-08-22 NOTE — HOSPITAL COURSE
Pt got admitted with acute on chronic combined systolic/diastolic heart failure and Fluid overload in the background of CKD  Pt was evaluated by Nephro MD and was started on iv lasix with resolution of symptoms  Later pt was discharged back to home

## 2023-08-22 NOTE — CONSULTS
INPATIENT NEPHROLOGY CONSULT   [facility] NEPHROLOGY    Juhi Taylor  08/22/2023    Reason for consultation:    Acute kidney injury    Chief Complaint:   Chief Complaint   Patient presents with    Shortness of Breath     OFF/ON X 1 WEEK    Hypertension     X 1 WEEK          History of Present Illness:    Per H and P      Patient is a 73-year-old  female with history of combined heart failure, diabetes mellitus, CKD, paroxysmal atrial fibrillation on Eliquis, CAD (followed by Dr. Pedersen), nonrheumatic mitral regurgitation, pulmonary hypertension, hypertension, hyperlipidemia - intolerant to statins, hypothyroidism, CKD (followed by Dr. Martinez), right renal carcinoma - status post right nephrectomy on 03/10/2022. She presents to the ED with complaints of increased shortness of breath on exertion, intermittent chronic chest discomfort, lower leg edema and hypertension.  On assessment in ED initial blood pressure 168/104, creatinine 3.0, BUN 56, GFR 16.4, troponin 34.0, 32.3, .  IV Lasix 60 mg 1 time dose ordered in ED.     On assessment patient is alert oriented x4.  She reports chronic intermittent chest discomfort however states that it is her main complaint.  She reports her main complaint was the increased weight gain in the last 2 weeks, lower leg edema, increased shortness of breath with exertion and at rest, increased fatigue and weakness.  She reports monitoring her blood pressure at home and noticed increase with SBP >200 and DBP >120 for last 5 days.  She reports increased 10-20 lb weight gain within the last couple of months.  She denies dysuria.  She reports use of spironolactone, and Lasix on alternating days.  Last dose of Lasix 1 day ago.  She reports Nephrology spoke with her regarding dialysis possible near future.  She reports compliance with Eliquis daily however denies use of amiodarone, and Calcitrol.  She denies lightheaded, dizziness, nausea, vomiting, dysuria,  hematuria, hematochezia, fever, chills.  She denies smoking, illicit drugs, alcohol.     8/21  AFVSS. I/Os not recorded.  Feels better this am.  No cp and sob improved.    8/22  AFVSS.  Output not recorded despite I/Os being ordered.    Feels well.        Plan of Care:       Assessment:    Acute kidney injury secondary to diuresis and cardiorenal syndrome type 1  --strict I/Os  --ua with micro  --hold diuretic (CR 3.0-->3.6)  --Avoid NSAIDS, Hidalgo II inhibitors, and other non-essential nephrotoxic agents  --Keep map above 55  --hold metformin  --monitor k closely  --risk of need for HD d/w patient  --stable for d/c    Anemia  --iron low. Will set up outpt iron infusion    --hold PONCE while hypervolemic    Metabolic acidosis  --mild, no intervention needed    Secondary hyperparathyroidism  --low phosphorus diet  --intolerant to [place]  --will check pth and vit d level as outpatient             Thank you for allowing us to participate in this patient's care. We will continue to follow.    Vital Signs:  Temp Readings from Last 3 Encounters:   08/22/23 98.5 °F (36.9 °C) (Oral)   07/10/23 98.2 °F (36.8 °C) (Oral)   03/10/23 98.5 °F (36.9 °C) (Oral)       Pulse Readings from Last 3 Encounters:   08/22/23 77   07/10/23 80   06/26/23 (!) 58       BP Readings from Last 3 Encounters:   08/22/23 (!) 157/63   07/10/23 (!) 140/80   06/26/23 (!) 158/82       Weight:  Wt Readings from Last 3 Encounters:   08/20/23 128.7 kg (283 lb 10 oz)   08/21/23 128.7 kg (283 lb 11.7 oz)   07/10/23 126.9 kg (279 lb 10.5 oz)       Past Medical & Surgical History:  Past Medical History:   Diagnosis Date    Anticoagulant long-term use     Arthritis     Breast cancer 2014    invasive lobular carcinoma    Cancer of kidney 11/2020    RIGHT KIDNEY CANCER    CHF (congestive heart failure)     Coronary artery disease dx 2005    Depression     Diabetes mellitus     Diastolic heart failure secondary to hypertension     Gout     Hyperlipemia      "Hypertension     Hypertrophy of nasal turbinates     Kidney mass     Right    Levoscoliosis     Lung nodule     left    Multiple thyroid nodules     NICOLE (obstructive sleep apnea)     uses C-PAP    Pulmonary hypertension        Past Surgical History:   Procedure Laterality Date    AORTOGRAPHY N/A 2020    Procedure: Aortogram;  Surgeon: Paul Pedersen MD;  Location: [facility] CATH;  Service: Cardiology;  Laterality: N/A;    BREAST SURGERY      CARDIAC CATHETERIZATION  2020    CHOLECYSTECTOMY      COLONOSCOPY      multi -last      CORONARY ARTERY BYPASS GRAFT      ESOPHAGOGASTRODUODENOSCOPY      2012     FRACTURE SURGERY      HAND SURGERY Right     HYSTERECTOMY      LAPAROSCOPIC ROBOT-ASSISTED SURGICAL REMOVAL OF KIDNEY USING DA CHELLE XI Right 03/10/2022    Procedure: XI ROBOTIC NEPHRECTOMY- radical;  Surgeon: Rolando Ramirez MD;  Location: [facility] OR;  Service: Urology;  Laterality: Right;    MASTECTOMY W/ SENTINEL NODE BIOPSY Bilateral 2015    bilateral "dog ears"    NASAL SINUS SURGERY      Dr Bryant FESS/cauterization turbinate     PARTIAL HYSTERECTOMY      PERCUTANEOUS TRANSLUMINAL BALLOON ANGIOPLASTY OF CORONARY ARTERY  2020    Procedure: Angioplasty-coronary;  Surgeon: Paul Pedersen MD;  Location: [facility] CATH;  Service: Cardiology;;    RENAL BIOPSY Right     2021 EJ    TUBAL LIGATION      ULTRASOUND GUIDANCE  2020    Procedure: Ultrasound Guidance;  Surgeon: Paul Pedersen MD;  [facility] CATH;  Service: Cardiology;;       Past Social History:  Social History     Socioeconomic History    Marital status: Single    Number of children: 4   Occupational History    Occupation: retired Psych aid    Tobacco Use    Smoking status: Former     Current packs/day: 0.00     Types: Cigarettes     Start date: 2016     Quit date: 2016     Years since quittin.6    Smokeless tobacco: Never    Tobacco comments:     quit    Substance and Sexual Activity    " Alcohol use: No    Drug use: No    Sexual activity: Not Currently     Partners: Male     Birth control/protection: None       Medications:  No current facility-administered medications on file prior to encounter.     Current Outpatient Medications on File Prior to Encounter   Medication Sig Dispense Refill    allopurinoL (ZYLOPRIM) 300 MG tablet Take 1 tablet (300 mg total) by mouth once daily. 90 tablet 3    carvediloL (COREG) 25 MG tablet TAKE ONE TABLET BY MOUTH TWICE DAILY 180 tablet 2    cloNIDine (CATAPRES) 0.1 MG tablet Take 1 tablet (0.1 mg total) by mouth 3 (three) times daily as needed (PRN SBP > 165 mmHg). 90 tablet 6    ELIQUIS 5 mg Tab TAKE ONE TABLET BY MOUTH TWICE DAILY 60 tablet 4    furosemide (LASIX) 40 MG tablet Take 40 mg by mouth once daily.      glipiZIDE 5 MG TR24 Take 2 tablets (10 mg total) by mouth daily with breakfast. (Patient taking differently: Take 5 mg by mouth daily with breakfast.) 180 tablet 3    latanoprost 0.005 % ophthalmic solution Place 1 drop into both eyes every evening. 7.5 mL 3    loratadine (CLARITIN) 10 mg tablet Take 10 mg by mouth once daily.      magnesium oxide (MAG-OX) 400 mg (241.3 mg magnesium) tablet Take 1 tablet (400 mg total) by mouth daily as needed (cramping). 30 tablet 0    montelukast (SINGULAIR) 10 mg tablet TAKE ONE TABLET BY MOUTH EVERY EVENING FOR allergies 90 tablet 3    PRALUENT PEN 75 mg/mL PnIj Inject 1 mL (75 mg total) into the skin every 14 (fourteen) days. 6 mL 3    spironolactone (ALDACTONE) 25 MG tablet Take 0.5 tablets (12.5 mg total) by mouth once daily. 30 tablet 11    blood sugar diagnostic (TRUE METRIX GLUCOSE TEST STRIP) Strp Use 1x daily. Insurance preferred. 100 strip 3    blood sugar diagnostic Strp To check BG 1 time daily, to use with insurance preferred meter 100 strip 3    blood-glucose meter kit To check BG 1 times daily, to use with insurance preferred meter 1 each 0    cloNIDine 0.1 mg/24 hr td ptwk (CATAPRES) 0.1 mg/24 hr  Place 1 patch onto the skin every 7 days. 4 patch 11    famotidine (PEPCID) 20 MG tablet Take 1 tablet (20 mg total) by mouth once daily. 30 tablet 11    lancets Misc To check BG 1 times daily, to use with insurance preferred meter 100 each 3    lancets Misc To check BG 1 times daily, to use with insurance preferred meter 100 each 3    leg brace Misc Leg brace: Ossor Unloaded OneX for lateral unloading, right leg 1 each 0    nitroGLYCERIN (NITROSTAT) 0.4 MG SL tablet Place 1 tablet (0.4 mg total) under the tongue every 5 (five) minutes as needed for Chest pain. 25 tablet 0    traMADoL (ULTRAM) 50 mg tablet Take by mouth.      [DISCONTINUED] aspirin (ECOTRIN) 81 MG EC tablet Take 1 tablet (81 mg total) by mouth once daily. 90 tablet 3    [DISCONTINUED] DULoxetine (CYMBALTA) 20 MG capsule TAKE ONE CAPSULE BY MOUTH ONCE DAILY 30 capsule 4    [DISCONTINUED] folic acid (FOLVITE) 1 MG tablet Take 1 mg by mouth once daily.      [DISCONTINUED] metFORMIN (GLUCOPHAGE-XR) 500 MG ER 24hr tablet Take 2 tablets (1,000 mg total) by mouth 2 (two) times daily with meals. 360 tablet 3    [DISCONTINUED] sodium bicarbonate 650 MG tablet Take 1 tablet (650 mg total) by mouth once daily. 30 tablet 11     Scheduled Meds:   apixaban  5 mg Oral BID    carvediloL  25 mg Oral BID    cloNIDine 0.1 mg/24 hr td ptwk  1 patch Transdermal Q7 Days    latanoprost  1 drop Both Eyes QHS    montelukast  10 mg Oral Daily    senna-docusate 8.6-50 mg  1 tablet Oral BID     Continuous Infusions:  PRN Meds:.acetaminophen, acetaminophen, cloNIDine, dextrose 50%, dextrose 50%, glucagon (human recombinant), glucose, glucose, insulin aspart U-100, labetalol, magnesium oxide, magnesium oxide, magnesium oxide, melatonin, nitroGLYCERIN, ondansetron, polyethylene glycol, potassium bicarbonate, potassium bicarbonate, potassium bicarbonate, potassium, sodium phosphates, potassium, sodium phosphates, potassium, sodium phosphates, prochlorperazine, sodium chloride  "0.9%    Allergies:  Percocet [oxycodone-acetaminophen], Amiodarone analogues, Irbesartan, Januvia [sitagliptin], Jardiance [empagliflozin], Lipitor [atorvastatin], Linaclotide, and Lubiprostone    Past Family History:  Reviewed; refer to Hospitalist Admission Note    Review of Systems:  Review of Systems - All 14 systems reviewed and negative, except as noted in HPI    Physical Exam:    BP (!) 157/63 (BP Location: Right arm, Patient Position: Lying)   Pulse 77   Temp 98.5 °F (36.9 °C) (Oral)   Resp 18   Ht 5' 7" (1.702 m)   Wt 128.7 kg (283 lb 10 oz)   SpO2 96%   BMI 44.42 kg/m²     General Appearance:    Alert, cooperative, no distress, appears stated age   Head:    Normocephalic, without obvious abnormality, atraumatic   Eyes:    PER, conjunctiva/corneas clear, EOM's intact in both eyes        Throat:   Lips, mucosa, and tongue normal; teeth and gums normal   Back:     Symmetric, no curvature, ROM normal, no CVA tenderness   Lungs:     Clear to auscultation bilaterally, respirations unlabored   Chest wall:    No tenderness or deformity   Heart:    Regular rate and rhythm, S1 and S2 normal, no murmur, rub   or gallop   Abdomen:     Soft, non-tender, bowel sounds active all four quadrants,     no masses, no organomegaly   Extremities:   Extremities normal, atraumatic, no cyanosis or edema   Pulses:   2+ and symmetric all extremities   MSK:   No joint or muscle swelling, tenderness or deformity   Skin:   Skin color, texture, turgor normal, no rashes or lesions   Neurologic:   CNII-XII intact, normal strength and sensation       Throughout.  No flap     Results:  Lab Results   Component Value Date     08/22/2023     08/22/2023    K 4.0 08/22/2023    K 4.0 08/22/2023     08/22/2023     08/22/2023    CO2 25 08/22/2023    CO2 25 08/22/2023    BUN 66 (H) 08/22/2023    BUN 66 (H) 08/22/2023    CREATININE 3.1 (H) 08/22/2023    CREATININE 3.1 (H) 08/22/2023    CALCIUM 9.2 08/22/2023    CALCIUM " 9.2 08/22/2023    ANIONGAP 6 (L) 08/22/2023    ANIONGAP 6 (L) 08/22/2023    ESTGFRAFRICA 16.2 (A) 07/15/2022    EGFRNONAA 14.0 (A) 07/15/2022       Lab Results   Component Value Date    CALCIUM 9.2 08/22/2023    CALCIUM 9.2 08/22/2023    PHOS 3.9 08/22/2023       Recent Labs   Lab 08/22/23  0328   WBC 7.81  7.81   RBC 3.83*  3.83*   HGB 10.0*  10.0*   HCT 32.5*  32.5*     259   MCV 85  85   MCH 26.1*  26.1*   MCHC 30.8*  30.8*            I have personally reviewed pertinent radiological imaging and reports.

## 2023-08-22 NOTE — SUBJECTIVE & OBJECTIVE
Interval History:         Review of Systems   Constitutional:  Negative for activity change and appetite change.   HENT:  Negative for congestion and dental problem.    Eyes:  Negative for discharge and itching.   Respiratory:  Negative for shortness of breath.    Cardiovascular:  Negative for chest pain.   Gastrointestinal:  Negative for abdominal distention and abdominal pain.   Endocrine: Negative for cold intolerance.   Genitourinary:  Negative for difficulty urinating and dysuria.   Musculoskeletal:  Negative for arthralgias and back pain.   Skin:  Negative for color change.   Neurological:  Negative for dizziness and facial asymmetry.   Hematological:  Negative for adenopathy.   Psychiatric/Behavioral:  Negative for agitation and behavioral problems.      Objective:     Vital Signs (Most Recent):  Temp: 98.6 °F (37 °C) (08/21/23 1500)  Pulse: 68 (08/21/23 1500)  Resp: 16 (08/21/23 1500)  BP: 120/61 (08/21/23 1500)  SpO2: 97 % (08/21/23 1500) Vital Signs (24h Range):  Temp:  [98 °F (36.7 °C)-98.6 °F (37 °C)] 98.6 °F (37 °C)  Pulse:  [68-81] 68  Resp:  [15-25] 16  SpO2:  [90 %-97 %] 97 %  BP: (120-165)/(61-79) 120/61     Weight: 128.7 kg (283 lb 10 oz)  Body mass index is 44.42 kg/m².    Intake/Output Summary (Last 24 hours) at 8/21/2023 1904  Last data filed at 8/21/2023 1618  Gross per 24 hour   Intake --   Output 650 ml   Net -650 ml         Physical Exam  Vitals and nursing note reviewed.   Constitutional:       General: She is not in acute distress.  HENT:      Head: Atraumatic.      Right Ear: External ear normal.      Left Ear: External ear normal.      Nose: Nose normal.      Mouth/Throat:      Mouth: Mucous membranes are moist.   Cardiovascular:      Rate and Rhythm: Normal rate.   Pulmonary:      Effort: Pulmonary effort is normal.   Musculoskeletal:         General: Normal range of motion.      Cervical back: Normal range of motion.   Skin:     General: Skin is warm.   Neurological:      Mental  Status: She is alert and oriented to person, place, and time.   Psychiatric:         Behavior: Behavior normal.             Significant Labs: All pertinent labs within the past 24 hours have been reviewed.  CBC:   Recent Labs   Lab 08/20/23 1814 08/21/23  0333   WBC 8.99 8.48   HGB 10.6* 10.9*   HCT 35.1* 36.5*    281     CMP:   Recent Labs   Lab 08/20/23 1814 08/21/23  0333    140   K 3.8 4.6    108   CO2 23 20*   * 120*   BUN 56* 69*   CREATININE 3.0* 3.6*   CALCIUM 9.6 9.3   PROT 7.3 6.9   ALBUMIN 3.4* 3.2*   BILITOT 0.4 0.7   ALKPHOS 106 100   AST 19 24   ALT 16 13   ANIONGAP 8 12       Significant Imaging: I have reviewed all pertinent imaging results/findings within the past 24 hours.

## 2023-08-22 NOTE — CARE UPDATE
08/21/23 0664   Patient Assessment/Suction   Level of Consciousness (AVPU) alert   Respiratory Effort Unlabored   PRE-TX-O2   Device (Oxygen Therapy) nasal cannula   Flow (L/min) 2   SpO2 98 %

## 2023-08-22 NOTE — PLAN OF CARE
Patient cleared for discharge from case management standpoint.    Chart and discharge orders reviewed.  Patient discharged home with no further case management needs.     08/22/23 1328   Final Note   Assessment Type Final Discharge Note   Anticipated Discharge Disposition Home   Post-Acute Status   Discharge Delays None known at this time

## 2023-08-22 NOTE — PROGRESS NOTES
[facility] Medicine  Progress Note    Patient Name: Juhi Taylor  MRN: [id number]  Patient Class: IP- Inpatient   Admission Date: 8/20/2023  Length of Stay: 0 days  Attending Physician: Baldev Hoover MD  Primary Care Provider: Tony Sadler MD        Subjective:     Principal Problem:Congestive heart failure        HPI:  Patient is a 73-year-old  female with history of combined heart failure, diabetes mellitus, CKD, paroxysmal atrial fibrillation on Eliquis, CAD (followed by Dr. Pedersen), nonrheumatic mitral regurgitation, pulmonary hypertension, hypertension, hyperlipidemia - intolerant to statins, hypothyroidism, CKD (followed by Dr. Martinez), right renal carcinoma - status post right nephrectomy on 03/10/2022. She presents to the ED with complaints of increased shortness of breath on exertion, intermittent chronic chest discomfort, lower leg edema and hypertension.  On assessment in ED initial blood pressure 168/104, creatinine 3.0, BUN 56, GFR 16.4, troponin 34.0, 32.3, .  IV Lasix 60 mg 1 time dose ordered in ED.    On assessment patient is alert oriented x4.  She reports chronic intermittent chest discomfort however states that it is her main complaint.  She reports her main complaint was the increased weight gain in the last 2 weeks, lower leg edema, increased shortness of breath with exertion and at rest, increased fatigue and weakness.  She reports monitoring her blood pressure at home and noticed increase with SBP >200 and DBP >120 for last 5 days.  She reports increased 10-20 lb weight gain within the last couple of months.  She denies dysuria.  She reports use of spironolactone, and Lasix on alternating days.  Last dose of Lasix 1 day ago.  She reports Nephrology spoke with her regarding dialysis possible near future.  She reports compliance with Eliquis daily however denies use of amiodarone, and Calcitrol.  She denies lightheaded, dizziness,  nausea, vomiting, dysuria, hematuria, hematochezia, fever, chills.  She denies smoking, illicit drugs, alcohol.    Hospitalist asked to admit for hypertensive urgency, CHF exacerbation, Consult Nephrology.  Discussed  Plan with attending    Impression:  Chest x-ray one view 8/20/23  There is mild interstitial prominence which can be due to edema and/or low lung volumes     Last echo  Summary 11/19/2021     The estimated ejection fraction is 35-40%.   The left ventricle is moderately enlarged with moderate concentric hypertrophy   Severe left atrial enlargement.   Normal right ventricular size with normal right ventricular systolic function.   Mild-to-moderate mitral regurgitation.   Mild tricuspid regurgitation.   The estimated PA systolic pressure is 44 mmHg.   There is pulmonary hypertension.      Overview/Hospital Course:  08/21  Much better and c/o less dyspnoea  Diuretics on hold   Latest Reference Range & Units 03/03/23 13:28 04/14/23 12:45 07/14/23 12:01 08/20/23 18:14 08/21/23 03:33   Creatinine 0.5 - 1.4 mg/dL 3.5 (H) 3.1 (H)  3.0 (H) 3.5 (H) 3.0 (H) 3.6 (H)       Interval History:         Review of Systems   Constitutional:  Negative for activity change and appetite change.   HENT:  Negative for congestion and dental problem.    Eyes:  Negative for discharge and itching.   Respiratory:  Negative for shortness of breath.    Cardiovascular:  Negative for chest pain.   Gastrointestinal:  Negative for abdominal distention and abdominal pain.   Endocrine: Negative for cold intolerance.   Genitourinary:  Negative for difficulty urinating and dysuria.   Musculoskeletal:  Negative for arthralgias and back pain.   Skin:  Negative for color change.   Neurological:  Negative for dizziness and facial asymmetry.   Hematological:  Negative for adenopathy.   Psychiatric/Behavioral:  Negative for agitation and behavioral problems.      Objective:     Vital Signs (Most Recent):  Temp: 98.6 °F (37 °C) (08/21/23  1500)  Pulse: 68 (08/21/23 1500)  Resp: 16 (08/21/23 1500)  BP: 120/61 (08/21/23 1500)  SpO2: 97 % (08/21/23 1500) Vital Signs (24h Range):  Temp:  [98 °F (36.7 °C)-98.6 °F (37 °C)] 98.6 °F (37 °C)  Pulse:  [68-81] 68  Resp:  [15-25] 16  SpO2:  [90 %-97 %] 97 %  BP: (120-165)/(61-79) 120/61     Weight: 128.7 kg (283 lb 10 oz)  Body mass index is 44.42 kg/m².    Intake/Output Summary (Last 24 hours) at 8/21/2023 1904  Last data filed at 8/21/2023 1618  Gross per 24 hour   Intake --   Output 650 ml   Net -650 ml         Physical Exam  Vitals and nursing note reviewed.   Constitutional:       General: She is not in acute distress.  HENT:      Head: Atraumatic.      Right Ear: External ear normal.      Left Ear: External ear normal.      Nose: Nose normal.      Mouth/Throat:      Mouth: Mucous membranes are moist.   Cardiovascular:      Rate and Rhythm: Normal rate.   Pulmonary:      Effort: Pulmonary effort is normal.   Musculoskeletal:         General: Normal range of motion.      Cervical back: Normal range of motion.   Skin:     General: Skin is warm.   Neurological:      Mental Status: She is alert and oriented to person, place, and time.   Psychiatric:         Behavior: Behavior normal.             Significant Labs: All pertinent labs within the past 24 hours have been reviewed.  CBC:   Recent Labs   Lab 08/20/23  1814 08/21/23  0333   WBC 8.99 8.48   HGB 10.6* 10.9*   HCT 35.1* 36.5*    281     CMP:   Recent Labs   Lab 08/20/23  1814 08/21/23  0333    140   K 3.8 4.6    108   CO2 23 20*   * 120*   BUN 56* 69*   CREATININE 3.0* 3.6*   CALCIUM 9.6 9.3   PROT 7.3 6.9   ALBUMIN 3.4* 3.2*   BILITOT 0.4 0.7   ALKPHOS 106 100   AST 19 24   ALT 16 13   ANIONGAP 8 12       Significant Imaging: I have reviewed all pertinent imaging results/findings within the past 24 hours.      Assessment/Plan:      * Congestive heart failure  Type unknown  Had iv lasix  Now diuretics on hold  Home tomorrow  No  acute issues     Chronic renal failure (CRF), stage 4 (severe)   Latest Reference Range & Units 03/03/23 13:28 04/14/23 12:45 07/14/23 12:01 08/20/23 18:14 08/21/23 03:33   Creatinine 0.5 - 1.4 mg/dL 3.5 (H) 3.1 (H)  3.0 (H) 3.5 (H) 3.0 (H) 3.6 (H)       Hypertensive urgency  Resolved     Paroxysmal atrial fibrillation  Sinus rhythm  Continue Eliquis 5 mg b.i.d.    Obesity, Class III, BMI 40-49.9 (morbid obesity)  Body mass index is 44.42 kg/m². Morbid obesity complicates all aspects of disease management from diagnostic modalities to treatment.     S/P coronary artery stent placement  Managed outpatient with Dr Pedersen cardiology      NICOLE (obstructive sleep apnea)  CPAP HS      Type 2 diabetes mellitus with microalbuminuria, without long-term current use of insulin  Maintain present regime    Coronary artery disease  Stable         VTE Risk Mitigation (From admission, onward)         Ordered     apixaban tablet 5 mg  2 times daily         08/20/23 2256     IP VTE HIGH RISK PATIENT  Once         08/20/23 2042     Place sequential compression device  Until discontinued         08/20/23 2042                Discharge Planning   HARINI: 8/21/2023     Code Status: Full Code   Is the patient medically ready for discharge?:     Reason for patient still in hospital (select all that apply): Treatment  Discharge Plan A: Home with family                  Baldev Hoover MD  Department of Hospital Medicine   [facility]

## 2023-08-22 NOTE — ASSESSMENT & PLAN NOTE
Latest Reference Range & Units 03/03/23 13:28 04/14/23 12:45 07/14/23 12:01 08/20/23 18:14 08/21/23 03:33   Creatinine 0.5 - 1.4 mg/dL 3.5 (H) 3.1 (H)  3.0 (H) 3.5 (H) 3.0 (H) 3.6 (H)

## 2023-08-22 NOTE — ASSESSMENT & PLAN NOTE
Body mass index is 44.42 kg/m². Morbid obesity complicates all aspects of disease management from diagnostic modalities to treatment.

## 2023-08-22 NOTE — CARE UPDATE
08/22/23 0846   Skin Integrity   $ Wound Care Tech Time 15 min   Area Observed Bridge of nose   Skin Appearance without discoloration   PRE-TX-O2   Device (Oxygen Therapy) room air   SpO2 (!) 93 %   Pulse Oximetry Type Intermittent   $ Pulse Oximetry - Multiple Charge Pulse Oximetry - Multiple   Pulse 77   Resp 15   Preset CPAP/BiPAP Settings   $ CPAP/BiPAP Daily Charge BiPAP/CPAP Daily   Respiratory Evaluation   $ Care Plan Tech Time 15 min

## 2023-08-23 ENCOUNTER — PATIENT OUTREACH (OUTPATIENT)
Dept: ADMINISTRATIVE | Facility: CLINIC | Age: 73
End: 2023-08-23
Payer: MEDICARE

## 2023-08-23 NOTE — DISCHARGE SUMMARY
Critical access hospital Medicine  Discharge Summary      Patient Name: Juhi Taylor  MRN: [id number]  ALENA: [id number]  Patient Class: IP- Inpatient  Admission Date: 8/20/2023  Hospital Length of Stay: 1 days  Discharge Date and Time:  08/22/2023 8:06 PM  Attending Physician: No att. providers found   Discharging Provider: Baldev Hoover MD  Primary Care Provider: Tony Sadler MD    Primary Care Team: Networked reference to record PCT     HPI:   Patient is a 73-year-old  female with history of combined heart failure, diabetes mellitus, CKD, paroxysmal atrial fibrillation on Eliquis, CAD (followed by Dr. Pedersen), nonrheumatic mitral regurgitation, pulmonary hypertension, hypertension, hyperlipidemia - intolerant to statins, hypothyroidism, CKD (followed by Dr. Martinez), right renal carcinoma - status post right nephrectomy on 03/10/2022. She presents to the ED with complaints of increased shortness of breath on exertion, intermittent chronic chest discomfort, lower leg edema and hypertension.  On assessment in ED initial blood pressure 168/104, creatinine 3.0, BUN 56, GFR 16.4, troponin 34.0, 32.3, .  IV Lasix 60 mg 1 time dose ordered in ED.    On assessment patient is alert oriented x4.  She reports chronic intermittent chest discomfort however states that it is her main complaint.  She reports her main complaint was the increased weight gain in the last 2 weeks, lower leg edema, increased shortness of breath with exertion and at rest, increased fatigue and weakness.  She reports monitoring her blood pressure at home and noticed increase with SBP >200 and DBP >120 for last 5 days.  She reports increased 10-20 lb weight gain within the last couple of months.  She denies dysuria.  She reports use of spironolactone, and Lasix on alternating days.  Last dose of Lasix 1 day ago.  She reports Nephrology spoke with her regarding dialysis possible near future.  She reports  compliance with Eliquis daily however denies use of amiodarone, and Calcitrol.  She denies lightheaded, dizziness, nausea, vomiting, dysuria, hematuria, hematochezia, fever, chills.  She denies smoking, illicit drugs, alcohol.    Hospitalist asked to admit for hypertensive urgency, CHF exacerbation, Consult Nephrology.  Discussed  Plan with attending    Impression:  Chest x-ray one view 8/20/23  There is mild interstitial prominence which can be due to edema and/or low lung volumes     Last echo  Summary 11/19/2021     The estimated ejection fraction is 35-40%.   The left ventricle is moderately enlarged with moderate concentric hypertrophy   Severe left atrial enlargement.   Normal right ventricular size with normal right ventricular systolic function.   Mild-to-moderate mitral regurgitation.   Mild tricuspid regurgitation.   The estimated PA systolic pressure is 44 mmHg.   There is pulmonary hypertension.      * No surgery found *      Hospital Course:   Pt got admitted with acute on chronic combined systolic/diastolic heart failure and Fluid overload in the background of CKD  Pt was evaluated by Nephro MD and was started on iv lasix with resolution of symptoms  Later pt was discharged back to home        Goals of Care Treatment Preferences:  Code Status: Full Code      Consults:   Consults (From admission, onward)        Status Ordering Provider     Inpatient consult to Registered Dietitian/Nutritionist  Once        Provider:  (Not yet assigned)    Completed [facility]     Inpatient consult to Nephrology  Once        Provider:  Inder Khalil MD    Completed [facility]          No new Assessment & Plan notes have been filed under this hospital service since the last note was generated.  Service: Hospital Medicine    Final Active Diagnoses:    Diagnosis Date Noted POA    PRINCIPAL PROBLEM:  Congestive heart failure [I50.9] 11/30/2020 Yes     Chronic    Chronic renal failure (CRF), stage 4  (severe) [N18.4] 08/20/2022 Yes     Chronic    Paroxysmal atrial fibrillation [I48.0] 12/10/2021 Not Applicable     Chronic    Obesity, Class III, BMI 40-49.9 (morbid obesity) [E66.01] 03/27/2021 Not Applicable     Chronic    NICOLE (obstructive sleep apnea) [G47.33] 06/28/2018 Yes     Chronic    Type 2 diabetes mellitus with microalbuminuria, without long-term current use of insulin [E11.29, R80.9] 08/31/2017 Yes     Chronic    Coronary artery disease [I25.10] 08/31/2017 Yes     Chronic      Problems Resolved During this Admission:    Diagnosis Date Noted Date Resolved POA    Hypertensive urgency [I16.0] 08/21/2023 08/22/2023 Yes       Discharged Condition: good    Disposition: Home or Self Care    Follow Up:    Patient Instructions:   No discharge procedures on file.    Significant Diagnostic Studies: Labs:   CMP   Recent Labs   Lab 08/21/23  0333 08/22/23  0328    138  138   K 4.6 4.0  4.0    107  107   CO2 20* 25  25   * 133*  133*   BUN 69* 66*  66*   CREATININE 3.6* 3.1*  3.1*   CALCIUM 9.3 9.2  9.2   PROT 6.9 6.3   ALBUMIN 3.2* 3.1*  3.1*   BILITOT 0.7 0.4   ALKPHOS 100 90   AST 24 15   ALT 13 15   ANIONGAP 12 6*  6*    and CBC   Recent Labs   Lab 08/21/23  0333 08/22/23  0328   WBC 8.48 7.81  7.81   HGB 10.9* 10.0*  10.0*   HCT 36.5* 32.5*  32.5*    259  259       Pending Diagnostic Studies:     None         Medications:  Reconciled Home Medications:      Medication List      CHANGE how you take these medications    glipiZIDE 5 MG Tr24  Take 2 tablets (10 mg total) by mouth daily with breakfast.  What changed: how much to take        CONTINUE taking these medications    allopurinoL 300 MG tablet  Commonly known as: ZYLOPRIM  Take 1 tablet (300 mg total) by mouth once daily.     * blood sugar diagnostic Strp  To check BG 1 time daily, to use with insurance preferred meter     * blood sugar diagnostic Strp  Commonly known as: TRUE METRIX GLUCOSE TEST STRIP  Use 1x  daily. Insurance preferred.     blood-glucose meter kit  To check BG 1 times daily, to use with insurance preferred meter     carvediloL 25 MG tablet  Commonly known as: COREG  TAKE ONE TABLET BY MOUTH TWICE DAILY     cloNIDine 0.1 MG tablet  Commonly known as: CATAPRES  Take 1 tablet (0.1 mg total) by mouth 3 (three) times daily as needed (PRN SBP > 165 mmHg).     cloNIDine 0.1 mg/24 hr td ptwk 0.1 mg/24 hr  Commonly known as: CATAPRES  Place 1 patch onto the skin every 7 days.     ELIQUIS 5 mg Tab  Generic drug: apixaban  TAKE ONE TABLET BY MOUTH TWICE DAILY     famotidine 20 MG tablet  Commonly known as: PEPCID  Take 1 tablet (20 mg total) by mouth once daily.     furosemide 40 MG tablet  Commonly known as: LASIX  Take 40 mg by mouth once daily.     * lancets Misc  To check BG 1 times daily, to use with insurance preferred meter     * lancets Misc  To check BG 1 times daily, to use with insurance preferred meter     latanoprost 0.005 % ophthalmic solution  Place 1 drop into both eyes every evening.     leg brace Misc  Leg brace: Ossor Unloaded OneX for lateral unloading, right leg     loratadine 10 mg tablet  Commonly known as: CLARITIN  Take 10 mg by mouth once daily.     magnesium oxide 400 mg (241.3 mg magnesium) tablet  Commonly known as: MAG-OX  Take 1 tablet (400 mg total) by mouth daily as needed (cramping).     montelukast 10 mg tablet  Commonly known as: SINGULAIR  TAKE ONE TABLET BY MOUTH EVERY EVENING FOR allergies     nitroGLYCERIN 0.4 MG SL tablet  Commonly known as: NITROSTAT  Place 1 tablet (0.4 mg total) under the tongue every 5 (five) minutes as needed for Chest pain.     PRALUENT PEN 75 mg/mL Pnij  Generic drug: alirocumab  Inject 1 mL (75 mg total) into the skin every 14 (fourteen) days.     spironolactone 25 MG tablet  Commonly known as: ALDACTONE  Take 0.5 tablets (12.5 mg total) by mouth once daily.     traMADoL 50 mg tablet  Commonly known as: ULTRAM  Take by mouth.         * This list has  4 medication(s) that are the same as other medications prescribed for you. Read the directions carefully, and ask your doctor or other care provider to review them with you.                Indwelling Lines/Drains at time of discharge:   Lines/Drains/Airways     None               Physical Exam   Constitutional: She is oriented to person, place, and time.   Cardiovascular: Normal rate.   Neurological: She is alert and oriented to person, place, and time.     Time spent on the discharge of patient: 45 minutes         Baldev Hoover MD  Department of Hospital Medicine  [facility]

## 2023-08-23 NOTE — PROGRESS NOTES
C3 nurse attempted to contact patient for a TCC post hospital discharge follow-up call. The patient declined call at this time.

## 2023-09-15 ENCOUNTER — HOSPITAL ENCOUNTER (OUTPATIENT)
Dept: RADIOLOGY | Facility: HOSPITAL | Age: 73
Discharge: HOME | End: 2023-09-15
Payer: MEDICARE

## 2023-09-15 ENCOUNTER — OFFICE VISIT (OUTPATIENT)
Dept: FAMILY MEDICINE | Facility: CLINIC | Age: 73
End: 2023-09-15
Payer: MEDICARE

## 2023-09-15 VITALS
SYSTOLIC BLOOD PRESSURE: 130 MMHG | OXYGEN SATURATION: 97 % | WEIGHT: 288.94 LBS | HEART RATE: 55 BPM | DIASTOLIC BLOOD PRESSURE: 80 MMHG | BODY MASS INDEX: 45.35 KG/M2 | HEIGHT: 67 IN

## 2023-09-15 DIAGNOSIS — I13.0 HYPERTENSIVE HEART AND KIDNEY DISEASE WITH HF AND WITH CKD STAGE IV: ICD-10-CM

## 2023-09-15 DIAGNOSIS — Z09 HOSPITAL DISCHARGE FOLLOW-UP: Primary | ICD-10-CM

## 2023-09-15 DIAGNOSIS — I25.10 CORONARY ARTERY DISEASE INVOLVING NATIVE CORONARY ARTERY OF NATIVE HEART WITHOUT ANGINA PECTORIS: Chronic | ICD-10-CM

## 2023-09-15 DIAGNOSIS — N18.4 HYPERTENSIVE HEART AND KIDNEY DISEASE WITH HF AND WITH CKD STAGE IV: ICD-10-CM

## 2023-09-15 DIAGNOSIS — I50.42 CHRONIC COMBINED SYSTOLIC AND DIASTOLIC CONGESTIVE HEART FAILURE: Chronic | ICD-10-CM

## 2023-09-15 DIAGNOSIS — I48.0 PAROXYSMAL ATRIAL FIBRILLATION: Chronic | ICD-10-CM

## 2023-09-15 DIAGNOSIS — R06.02 SOB (SHORTNESS OF BREATH): ICD-10-CM

## 2023-09-15 PROCEDURE — 71046 X-RAY EXAM CHEST 2 VIEWS: CPT | Mod: 26,,, | Performed by: RADIOLOGY

## 2023-09-15 PROCEDURE — 99999 PR PBB SHADOW E&M-EST. PATIENT-LVL III: ICD-10-PCS | Mod: PBBFAC,,,

## 2023-09-15 PROCEDURE — 99214 PR OFFICE/OUTPT VISIT, EST, LEVL IV, 30-39 MIN: ICD-10-PCS | Mod: S$PBB,,,

## 2023-09-15 PROCEDURE — 99999 PR PBB SHADOW E&M-EST. PATIENT-LVL III: CPT | Mod: PBBFAC,,,

## 2023-09-15 PROCEDURE — 71046 X-RAY EXAM CHEST 2 VIEWS: CPT | Mod: TC,PN

## 2023-09-15 PROCEDURE — 93010 ELECTROCARDIOGRAM REPORT: CPT | Mod: S$PBB,,, | Performed by: INTERNAL MEDICINE

## 2023-09-15 PROCEDURE — 99213 OFFICE O/P EST LOW 20 MIN: CPT | Mod: PBBFAC,25,PN

## 2023-09-15 PROCEDURE — 99214 OFFICE O/P EST MOD 30 MIN: CPT | Mod: S$PBB,,,

## 2023-09-15 PROCEDURE — 71046 XR CHEST PA AND LATERAL: ICD-10-PCS | Mod: 26,,, | Performed by: RADIOLOGY

## 2023-09-15 PROCEDURE — 93010 EKG 12-LEAD: ICD-10-PCS | Mod: S$PBB,,, | Performed by: INTERNAL MEDICINE

## 2023-09-15 PROCEDURE — 93005 ELECTROCARDIOGRAM TRACING: CPT | Mod: PBBFAC,PN | Performed by: INTERNAL MEDICINE

## 2023-09-15 RX ORDER — AMLODIPINE BESYLATE 10 MG/1
10 TABLET ORAL DAILY
COMMUNITY
Start: 2023-08-29

## 2023-10-05 RX ORDER — CARVEDILOL 25 MG/1
TABLET ORAL
Qty: 180 TABLET | Refills: 2 | Status: SHIPPED | OUTPATIENT
Start: 2023-10-05 | End: 2024-04-01 | Stop reason: SDUPTHER

## 2023-10-05 NOTE — TELEPHONE ENCOUNTER
No care due was identified.  [facility] Embedded Care Due Messages. Reference number: [id number].   10/05/2023 1:03:15 PM CDT

## 2023-10-10 ENCOUNTER — LAB VISIT (OUTPATIENT)
Dept: LAB | Facility: HOSPITAL | Age: 73
End: 2023-10-10
Payer: MEDICARE

## 2023-10-10 DIAGNOSIS — N18.4 CHRONIC KIDNEY DISEASE, STAGE IV (SEVERE): Primary | ICD-10-CM

## 2023-10-10 LAB
ALBUMIN SERPL BCP-MCNC: 3.4 G/DL (ref 3.5–5.2)
ANION GAP SERPL CALC-SCNC: 11 MMOL/L (ref 8–16)
BASOPHILS # BLD AUTO: 0.05 K/UL (ref 0–0.2)
BASOPHILS NFR BLD: 0.6 % (ref 0–1.9)
BUN SERPL-MCNC: 67 MG/DL (ref 8–23)
CALCIUM SERPL-MCNC: 10.1 MG/DL (ref 8.7–10.5)
CHLORIDE SERPL-SCNC: 102 MMOL/L (ref 95–110)
CO2 SERPL-SCNC: 26 MMOL/L (ref 23–29)
CREAT SERPL-MCNC: 3.4 MG/DL (ref 0.5–1.4)
CREAT UR-MCNC: 106 MG/DL (ref 15–325)
DIFFERENTIAL METHOD: ABNORMAL
EOSINOPHIL # BLD AUTO: 0.3 K/UL (ref 0–0.5)
EOSINOPHIL NFR BLD: 4 % (ref 0–8)
ERYTHROCYTE [DISTWIDTH] IN BLOOD BY AUTOMATED COUNT: 15.6 % (ref 11.5–14.5)
EST. GFR  (NO RACE VARIABLE): 13.9 ML/MIN/1.73 M^2
GLUCOSE SERPL-MCNC: 120 MG/DL (ref 70–110)
HCT VFR BLD AUTO: 35.4 % (ref 37–48.5)
HGB BLD-MCNC: 10.2 G/DL (ref 12–16)
IMM GRANULOCYTES # BLD AUTO: 0.02 K/UL (ref 0–0.04)
IMM GRANULOCYTES NFR BLD AUTO: 0.2 % (ref 0–0.5)
LYMPHOCYTES # BLD AUTO: 2.8 K/UL (ref 1–4.8)
LYMPHOCYTES NFR BLD: 33.9 % (ref 18–48)
MAGNESIUM SERPL-MCNC: 1.9 MG/DL (ref 1.6–2.6)
MCH RBC QN AUTO: 24.8 PG (ref 27–31)
MCHC RBC AUTO-ENTMCNC: 28.8 G/DL (ref 32–36)
MCV RBC AUTO: 86 FL (ref 82–98)
MICROSCOPIC COMMENT: NORMAL
MICROSCOPIC COMMENT: NORMAL
MONOCYTES # BLD AUTO: 0.6 K/UL (ref 0.3–1)
MONOCYTES NFR BLD: 7.7 % (ref 4–15)
NEUTROPHILS # BLD AUTO: 4.4 K/UL (ref 1.8–7.7)
NEUTROPHILS NFR BLD: 53.6 % (ref 38–73)
NRBC BLD-RTO: 0 /100 WBC
PHOSPHATE SERPL-MCNC: 3.8 MG/DL (ref 2.7–4.5)
PLATELET # BLD AUTO: 317 K/UL (ref 150–450)
PMV BLD AUTO: 10.8 FL (ref 9.2–12.9)
POTASSIUM SERPL-SCNC: 3.5 MMOL/L (ref 3.5–5.1)
PROT UR-MCNC: 44 MG/DL (ref 0–15)
PROT/CREAT UR: 0.42 MG/G{CREAT} (ref 0–0.2)
PTH-INTACT SERPL-MCNC: 583.1 PG/ML (ref 9–77)
RBC # BLD AUTO: 4.11 M/UL (ref 4–5.4)
RBC #/AREA URNS AUTO: 1 /HPF (ref 0–4)
RBC #/AREA URNS AUTO: 1 /HPF (ref 0–4)
SODIUM SERPL-SCNC: 139 MMOL/L (ref 136–145)
SQUAMOUS #/AREA URNS AUTO: 3 /HPF
SQUAMOUS #/AREA URNS AUTO: 3 /HPF
URATE SERPL-MCNC: 6.5 MG/DL (ref 2.4–5.7)
WBC # BLD AUTO: 8.15 K/UL (ref 3.9–12.7)
WBC #/AREA URNS AUTO: 3 /HPF (ref 0–5)
WBC #/AREA URNS AUTO: 3 /HPF (ref 0–5)

## 2023-10-10 PROCEDURE — 81001 URINALYSIS AUTO W/SCOPE: CPT

## 2023-10-10 PROCEDURE — 83735 ASSAY OF MAGNESIUM: CPT

## 2023-10-10 PROCEDURE — 84550 ASSAY OF BLOOD/URIC ACID: CPT

## 2023-10-10 PROCEDURE — 84156 ASSAY OF PROTEIN URINE: CPT

## 2023-10-10 PROCEDURE — 80069 RENAL FUNCTION PANEL: CPT

## 2023-10-10 PROCEDURE — 36415 COLL VENOUS BLD VENIPUNCTURE: CPT | Mod: PN

## 2023-10-10 PROCEDURE — 85025 COMPLETE CBC W/AUTO DIFF WBC: CPT

## 2023-10-10 PROCEDURE — 83970 ASSAY OF PARATHORMONE: CPT

## 2023-10-13 DIAGNOSIS — H40.1131 PRIMARY OPEN ANGLE GLAUCOMA (POAG) OF BOTH EYES, MILD STAGE: ICD-10-CM

## 2023-10-13 RX ORDER — LATANOPROST 50 UG/ML
1 SOLUTION/ DROPS OPHTHALMIC NIGHTLY
Qty: 7.5 ML | Refills: 3 | Status: ON HOLD | OUTPATIENT
Start: 2023-10-13 | End: 2024-03-18 | Stop reason: HOSPADM

## 2023-10-19 NOTE — ASSESSMENT & PLAN NOTE
· Will treat with augmentin, nasal saline and antihistamines and see how she does   Detail Level: Zone Plan: Location: Body (bilateral arms, legs, torso, face)\\nPharmacy: [facility] [phone]\\nCurrently on: Xolair 300mg subcutaneously every month \\nOriginal BSA: 20%\\nToday?s BSA: 2%\\nLabs done on: 1/17/2023\\nLabs due: 1/2024\\n\\n10/19/2023\\n\\nPatient is here for a follow up on chronic urticaria\\nPt received a Xolair injection in her last visit not today in the [place] office on 10/10/23\\nPt mentions the last injection she did start experiencing body aches and chills. \\nPatient has been receiving Xolair injections monthly and finds that this has controlled her flares\\nPatient did mentioned at times her arthritis will start to kick in and is questioning if the biological mediation she is currently on has any involvement. \\nShe also mentioned her mother dealing with arthritis and this this also may be hereditary\\nshe continues to respond to biological and does not have a side effects such as coughing up bloody mucus membranes, unwanted weight gain/loss and or flu like symptoms\\n\\nDiscussed with the patient, since she continues to respond to biological medication will continue her on monthly injections. \\n Reminded patient that she needs to continue taking her daily antihistamine and she can increase the dose as needed. \\nAlso reminded her of any potential side effects and advised her to call is if she starts experiencing these. \\n\\n\\nPatient is to follow up in 3 months. Plan: Location: [place]\\nPrescribe: Spironolactone 100mg \\n\\nPt is here for hair loss.\\nPt discussed that it has been gradually falling out.\\nHad a long discussion with patient about male/female pattern baldness and the best treatment options to prevent further hair loss. \\n\\nDiscussed with her that hair loss may be due to fluctuation of hormones.\\nDiscussed with pt that we will continue her on Spironolactone that helps block the receptors from attacking her skin.\\n\\nEducated patient on side effects/risks from medication and what to expect from taking the oral medication and applying topical medication\\nF/u as needed Plan: Location: body\\n\\nDermatographism was drawn to detect histamine level. \\nPatient has high histamine level and advised to start taking Allegra BID, especially during allergy season.\\nF/u as needed

## 2023-11-15 ENCOUNTER — OFFICE VISIT (OUTPATIENT)
Dept: FAMILY MEDICINE | Facility: CLINIC | Age: 73
End: 2023-11-15
Payer: MEDICARE

## 2023-11-15 VITALS
BODY MASS INDEX: 45.24 KG/M2 | HEART RATE: 58 BPM | DIASTOLIC BLOOD PRESSURE: 80 MMHG | OXYGEN SATURATION: 98 % | WEIGHT: 288.25 LBS | SYSTOLIC BLOOD PRESSURE: 122 MMHG | HEIGHT: 67 IN

## 2023-11-15 DIAGNOSIS — M10.9 GOUT, UNSPECIFIED CAUSE, UNSPECIFIED CHRONICITY, UNSPECIFIED SITE: ICD-10-CM

## 2023-11-15 DIAGNOSIS — Z00.00 MEDICARE ANNUAL WELLNESS VISIT, SUBSEQUENT: ICD-10-CM

## 2023-11-15 DIAGNOSIS — R22.32 NODULE OF SKIN OF LEFT HAND: ICD-10-CM

## 2023-11-15 DIAGNOSIS — I13.0 HYPERTENSIVE HEART AND KIDNEY DISEASE WITH HF AND WITH CKD STAGE IV: ICD-10-CM

## 2023-11-15 DIAGNOSIS — I48.0 PAROXYSMAL ATRIAL FIBRILLATION: ICD-10-CM

## 2023-11-15 DIAGNOSIS — L03.114 CELLULITIS OF LEFT UPPER EXTREMITY: Primary | ICD-10-CM

## 2023-11-15 DIAGNOSIS — N25.81 HYPERPARATHYROIDISM, SECONDARY RENAL: ICD-10-CM

## 2023-11-15 DIAGNOSIS — N18.4 HYPERTENSIVE HEART AND KIDNEY DISEASE WITH HF AND WITH CKD STAGE IV: ICD-10-CM

## 2023-11-15 DIAGNOSIS — B37.31 VAGINAL YEAST INFECTION: ICD-10-CM

## 2023-11-15 PROCEDURE — 99999 PR PBB SHADOW E&M-EST. PATIENT-LVL V: ICD-10-PCS | Mod: PBBFAC,,, | Performed by: INTERNAL MEDICINE

## 2023-11-15 PROCEDURE — 99999 PR PBB SHADOW E&M-EST. PATIENT-LVL V: CPT | Mod: PBBFAC,,, | Performed by: INTERNAL MEDICINE

## 2023-11-15 PROCEDURE — 99215 OFFICE O/P EST HI 40 MIN: CPT | Mod: PBBFAC,PN | Performed by: INTERNAL MEDICINE

## 2023-11-15 PROCEDURE — 99214 OFFICE O/P EST MOD 30 MIN: CPT | Mod: S$PBB,,, | Performed by: INTERNAL MEDICINE

## 2023-11-15 PROCEDURE — 99214 PR OFFICE/OUTPT VISIT, EST, LEVL IV, 30-39 MIN: ICD-10-PCS | Mod: S$PBB,,, | Performed by: INTERNAL MEDICINE

## 2023-11-15 RX ORDER — CEPHALEXIN 500 MG/1
500 CAPSULE ORAL EVERY 12 HOURS
Qty: 14 CAPSULE | Refills: 0 | Status: SHIPPED | OUTPATIENT
Start: 2023-11-15 | End: 2023-12-05

## 2023-11-15 RX ORDER — FLUCONAZOLE 150 MG/1
150 TABLET ORAL ONCE
Qty: 1 TABLET | Refills: 0 | Status: SHIPPED | OUTPATIENT
Start: 2023-11-15 | End: 2023-11-15

## 2023-11-15 RX ORDER — METOLAZONE 2.5 MG/1
2.5 TABLET ORAL NIGHTLY
COMMUNITY
Start: 2023-10-23

## 2023-11-15 NOTE — PROGRESS NOTES
Subjective:       Patient ID: Juhi Taylor is a 73 y.o. female.    Chief Complaint: Hand Pain (Knot on left hand for 10 days) and Medicare AWV   Left hand swelling w nodule. Present for about 10 days.  No trauma.  Tightness to moving hands.  Had episode same place last yr and resolved on it own.  Past thought it was gout.       Had lymph nodules removed in left axilla from breast cancer     Hyperparathyroid due to renal unable to tolerate any oral medications due to chronic constipation            Gyn:  Hysterectomy partical & 1 ovary   MMG:  History of breast cancer.  3/2021 MRI breast done negative  Dexa:  2/2019 osteopenia  Colonoscopy:   yes 2014 or later Dr Payne   Immunizations: Flu: D Tdap:  Not sure Pneumovax:  Refused Prevnar 13:  Refused Shingles: Covid: defers recommend w medical hx   Smoker:  Former   Eye: [place]  Dr Ninh [facility]           3.0-3.7 Cr   Intense itching to leg. Doesn't want pill recommend OTC Benadryl               CKD stage 4:   Gfr 12.  Worsened post nephrectomy.    + microalbumin positive. mgmt Renal [place] Dr Martinez Q 3 M               Anemia chronic disease:  hb 10.9 // Iron Sat 8 -Rx iron OTC              2nd Hyperparathyroid:  can't tolerate med causes constipation /.  PTH 300s   Vitamin-D def: missing doses Vit D2               Hx Renal cell carcinoma s/p  nephrectomy     Type 2 DM: Mgmt João Adasm PA-C   Jardiance due to muscle cramps/ just started Mounjaro 2.5 - tolerating.  // A1c up 8.1 / previous 5.5 off Metformin//   (SE Jardince stopped cramps, Metformin ER stopped ckd, regular diarrhea)      Gout: uric acid 10 - Rx today change to 1/2 pill daily Allopurinol 300 mg every other day.       A Fib: dx 2021:  Stable Rx Eliquis, ASA 81,   (SE:Amiodarone 100 multi cp, mouth lesions, Bildil chest pains) //  mgmt w Cardio [place]- stents w Dr Pedersen     CAD: stable. No CP// hx 3 stents Dec 2020. Off Plavix //Dr Kern                 CHF/pulmonary hypertension:   Pressure 51, EF 35-40%, DD grade 2.  + chronic shortness of breath reduced exercise tolerance. // Rx Entresto, Lasix.      HTN:  Controlled Rx Norvasc 10, carvedilol 25, Clonidine  //   off Hydralazine.    (SE: Norvasc 10 leg swelling)     HLD:  Controlled; Goal <70// Rx Praluent // intolerant to statins myalgias    Sleep Apnea: + CPAP/ using every day all night and w naps. Still working on diff masks that she likes.              Sleep testing: AHI 43 - auto bipap// 1/16/18: AHI 37.9 auto CPAP 8-20 mmHg .     Osteoarthritis multi joints: + leg pains, knees hips lower back. Diff walking. Sciatic right and radiation to hip _Gap helped a little - .  Reactive depression: off meds - doing ok right now   Prev tired the Cymbalta few days bad nausea stopped.       Hypothyroid: off last TSH July normal.  Prev rx Levo 50 (off Amiodarone).            ____________________________________________________________________________________________________  Assessment & Plan:  1. Cellulitis of left upper extremity  - cephALEXin (KEFLEX) 500 MG capsule; Take 1 capsule (500 mg total) by mouth every 12 (twelve) hours.  Dispense: 14 capsule; Refill: 0    2. Nodule of skin of left hand  - US Extremity Non Vascular Limited Left; Future  - Ambulatory referral/consult to Orthopedics; Future    3. Vaginal yeast infection  - fluconazole (DIFLUCAN) 150 MG Tab; Take 1 tablet (150 mg total) by mouth once. If needed yeast infection for 1 dose  Dispense: 1 tablet; Refill: 0    4. Paroxysmal atrial fibrillation  - apixaban (ELIQUIS) 5 mg Tab; Take 1 tablet (5 mg total) by mouth 2 (two) times daily.  Dispense: 180 tablet; Refill: 0    5. Medicare annual wellness visit, subsequent    6. Hypertensive heart and kidney disease with HF and with CKD stage IV    7. Gout, unspecified cause, unspecified chronicity, unspecified site    8. Hyperparathyroidism, secondary renal     Cellulitis of left upper extremity  Comments:  hand  Orders:  -     cephALEXin  (KEFLEX) 500 MG capsule; Take 1 capsule (500 mg total) by mouth every 12 (twelve) hours.  Dispense: 14 capsule; Refill: 0    Nodule of skin of left hand  Comments:  r/o lipoma, cyst. order US  Orders:  -     US Extremity Non Vascular Limited Left; Future; Expected date: 11/15/2023  -     Ambulatory referral/consult to Orthopedics; Future; Expected date: 11/22/2023    Vaginal yeast infection  -     fluconazole (DIFLUCAN) 150 MG Tab; Take 1 tablet (150 mg total) by mouth once. If needed yeast infection for 1 dose  Dispense: 1 tablet; Refill: 0    Paroxysmal atrial fibrillation  -     apixaban (ELIQUIS) 5 mg Tab; Take 1 tablet (5 mg total) by mouth 2 (two) times daily.  Dispense: 180 tablet; Refill: 0    Medicare annual wellness visit, subsequent    Hypertensive heart and kidney disease with HF and with CKD stage IV    Gout, unspecified cause, unspecified chronicity, unspecified site    Hyperparathyroidism, secondary renal        Continue to work on regular exercise, maintain healthy weight, balanced diet. Avoid unhealthy habits: smoking, excessive alcohol intake.     Disclaimer: This note was partly generated using dictation software which may occasionally result in transcription errors  ____________________________________________________________________________________________________  Review of Systems:  Review of Systems   Constitutional:  Negative for activity change and appetite change.   HENT:  Positive for hearing loss and trouble swallowing. Negative for nosebleeds.    Eyes:  Negative for pain and discharge.   Respiratory:  Negative for choking, chest tightness and wheezing.    Cardiovascular:  Negative for palpitations.   Gastrointestinal:  Positive for constipation. Negative for blood in stool, diarrhea and vomiting.   Endocrine: Negative for polydipsia.   Genitourinary:  Negative for difficulty urinating and hematuria.   Musculoskeletal:  Positive for arthralgias and neck pain. Negative for joint swelling.    Skin:  Negative for pallor.   Neurological:  Negative for facial asymmetry and headaches.   Hematological:  Does not bruise/bleed easily.   Psychiatric/Behavioral:  Negative for confusion and dysphoric mood.        Objective:     Wt Readings from Last 3 Encounters:   11/15/23 130.7 kg (288 lb 4 oz)   09/15/23 131.1 kg (288 lb 14.6 oz)   08/20/23 128.7 kg (283 lb 10 oz)     BP Readings from Last 3 Encounters:   11/15/23 122/80   09/15/23 130/80   08/22/23 (!) 157/63       Lab Results   Component Value Date    WBC 8.15 10/10/2023    HGB 10.2 (L) 10/10/2023    HCT 35.4 (L) 10/10/2023     10/10/2023     10/10/2023    K 3.5 10/10/2023     10/10/2023    ALT 15 08/22/2023    AST 15 08/22/2023    CO2 26 10/10/2023    CREATININE 3.4 (H) 10/10/2023    BUN 67 (H) 10/10/2023     (H) 10/10/2023      Hemoglobin A1C   Date Value Ref Range Status   07/05/2023 5.6 4.0 - 5.6 % Final     Comment:     ADA Screening Guidelines:  5.7-6.4%  Consistent with prediabetes  >or=6.5%  Consistent with diabetes    High levels of fetal hemoglobin interfere with the HbA1C  assay. Heterozygous hemoglobin variants (HbS, HgC, etc)do  not significantly interfere with this assay.   However, presence of multiple variants may affect accuracy.     04/14/2023 6.1 (H) 4.0 - 5.6 % Final     Comment:     ADA Screening Guidelines:  5.7-6.4%  Consistent with prediabetes  >or=6.5%  Consistent with diabetes    High levels of fetal hemoglobin interfere with the HbA1C  assay. Heterozygous hemoglobin variants (HbS, HgC, etc)do  not significantly interfere with this assay.   However, presence of multiple variants may affect accuracy.     03/03/2023 6.5 (H) 4.0 - 5.6 % Final     Comment:     ADA Screening Guidelines:  5.7-6.4%  Consistent with prediabetes  >or=6.5%  Consistent with diabetes    High levels of fetal hemoglobin interfere with the HbA1C  assay. Heterozygous hemoglobin variants (HbS, HgC, etc)do  not significantly interfere with  this assay.   However, presence of multiple variants may affect accuracy.        Lab Results   Component Value Date    TSH 1.042 07/05/2023    TSH 0.335 (L) 04/14/2023    TSH 0.335 (L) 04/14/2023     Lab Results   Component Value Date    FREET4 0.89 07/05/2023    FREET4 1.03 04/14/2023    FREET4 1.03 04/14/2023     Lab Results   Component Value Date    LDLCALC 94.0 04/14/2023    LDLCALC 70.8 10/10/2022    LDLCALC 62.6 (L) 08/16/2021     Lab Results   Component Value Date    TRIG 85 04/14/2023    TRIG 126 10/10/2022    TRIG 117 08/16/2021            Physical Exam  Constitutional:       Appearance: Normal appearance.   HENT:      Head: Normocephalic and atraumatic.   Eyes:      Extraocular Movements: Extraocular movements intact.      Pupils: Pupils are equal, round, and reactive to light.   Cardiovascular:      Rate and Rhythm: Normal rate.   Pulmonary:      Effort: Pulmonary effort is normal.   Musculoskeletal:      Right lower leg: No edema.      Left lower leg: No edema.      Comments: 2 in x 1.5 in nodule top left hand, mild tenderness .    Neurological:      Mental Status: She is alert.         Medication List with Changes/Refills   New Medications    CEPHALEXIN (KEFLEX) 500 MG CAPSULE    Take 1 capsule (500 mg total) by mouth every 12 (twelve) hours.    FLUCONAZOLE (DIFLUCAN) 150 MG TAB    Take 1 tablet (150 mg total) by mouth once. If needed yeast infection for 1 dose   Current Medications    ALLOPURINOL (ZYLOPRIM) 300 MG TABLET    Take 1 tablet (300 mg total) by mouth once daily.    AMLODIPINE (NORVASC) 10 MG TABLET    Take 10 mg by mouth.    BLOOD SUGAR DIAGNOSTIC (TRUE METRIX GLUCOSE TEST STRIP) STRP    Use 1x daily. Insurance preferred.    BLOOD SUGAR DIAGNOSTIC STRP    To check BG 1 time daily, to use with insurance preferred meter    CARVEDILOL (COREG) 25 MG TABLET    TAKE ONE TABLET BY MOUTH TWICE DAILY    CLONIDINE (CATAPRES) 0.1 MG TABLET    Take 1 tablet (0.1 mg total) by mouth 3 (three) times daily  as needed (PRN SBP > 165 mmHg).    CLONIDINE 0.1 MG/24 HR TD PTWK (CATAPRES) 0.1 MG/24 HR    Place 1 patch onto the skin every 7 days.    FUROSEMIDE (LASIX) 40 MG TABLET    Take 40 mg by mouth once daily.    GLIPIZIDE 5 MG TR24    Take 2 tablets (10 mg total) by mouth daily with breakfast.    LANCETS MISC    To check BG 1 times daily, to use with insurance preferred meter    LANCETS MISC    To check BG 1 times daily, to use with insurance preferred meter    LATANOPROST 0.005 % OPHTHALMIC SOLUTION    Place 1 drop into both eyes every evening.    LORATADINE (CLARITIN) 10 MG TABLET    Take 10 mg by mouth once daily.    MAGNESIUM OXIDE (MAG-OX) 400 MG (241.3 MG MAGNESIUM) TABLET    Take 1 tablet (400 mg total) by mouth daily as needed (cramping).    METOLAZONE (ZAROXOLYN) 2.5 MG TABLET    Take 2.5 mg by mouth.    MONTELUKAST (SINGULAIR) 10 MG TABLET    TAKE ONE TABLET BY MOUTH EVERY EVENING FOR allergies    NITROGLYCERIN (NITROSTAT) 0.4 MG SL TABLET    Place 1 tablet (0.4 mg total) under the tongue every 5 (five) minutes as needed for Chest pain.    PRALUENT PEN 75 MG/ML PNIJ    Inject 1 mL (75 mg total) into the skin every 14 (fourteen) days.    SPIRONOLACTONE (ALDACTONE) 25 MG TABLET    Take 0.5 tablets (12.5 mg total) by mouth once daily.   Changed and/or Refilled Medications    Modified Medication Previous Medication    APIXABAN (ELIQUIS) 5 MG TAB ELIQUIS 5 mg Tab       Take 1 tablet (5 mg total) by mouth 2 (two) times daily.    TAKE ONE TABLET BY MOUTH TWICE DAILY   Discontinued Medications    BLOOD-GLUCOSE METER KIT    To check BG 1 times daily, to use with insurance preferred meter    FAMOTIDINE (PEPCID) 20 MG TABLET    Take 1 tablet (20 mg total) by mouth once daily.    LEG BRACE MISC    Leg brace: Ossor Unloaded OneX for lateral unloading, right leg    TRAMADOL (ULTRAM) 50 MG TABLET    Take by mouth.

## 2023-11-16 ENCOUNTER — HOSPITAL ENCOUNTER (OUTPATIENT)
Dept: RADIOLOGY | Facility: HOSPITAL | Age: 73
Discharge: HOME | End: 2023-11-16
Attending: INTERNAL MEDICINE
Payer: MEDICARE

## 2023-11-16 ENCOUNTER — TELEPHONE (OUTPATIENT)
Dept: FAMILY MEDICINE | Facility: CLINIC | Age: 73
End: 2023-11-16
Payer: MEDICARE

## 2023-11-16 DIAGNOSIS — R22.32 NODULE OF SKIN OF LEFT HAND: ICD-10-CM

## 2023-11-16 PROCEDURE — 76882 US LMTD JT/FCL EVL NVASC XTR: CPT | Mod: 26,LT,, | Performed by: RADIOLOGY

## 2023-11-16 PROCEDURE — 76882 US LMTD JT/FCL EVL NVASC XTR: CPT | Mod: TC,PN,LT

## 2023-11-16 PROCEDURE — 76882 US EXTREMITY NON VASCULAR LIMITED LEFT: ICD-10-PCS | Mod: 26,LT,, | Performed by: RADIOLOGY

## 2023-11-16 NOTE — TELEPHONE ENCOUNTER
Pt notified and verbalized understanding     Pt has not taken medication due to pharmacy stating keflex being hard on the kidneys.    Per Dr. Sadler ok to only do warm compress and not take abx---pt verbalized understanding

## 2023-11-16 NOTE — TELEPHONE ENCOUNTER
"Let her know ultrasound shows swelling that appears to be hematoma. "Collection of fluid or blood"  continue antibiotics given.   She can apply warm compresses to the area to help it, reabsorb and reduce in size.    Swelling becomes worse or hand becomes painful recommend ER visit.  Over the weekend.       Make sure she makes follow-up appointment with orthopedic for an evaluation.      "

## 2023-12-03 DIAGNOSIS — Z71.89 COMPLEX CARE COORDINATION: ICD-10-CM

## 2023-12-05 ENCOUNTER — OFFICE VISIT (OUTPATIENT)
Dept: OPTOMETRY | Facility: CLINIC | Age: 73
End: 2023-12-05
Payer: MEDICARE

## 2023-12-05 DIAGNOSIS — H25.13 NUCLEAR SCLEROSIS, BILATERAL: ICD-10-CM

## 2023-12-05 DIAGNOSIS — H04.123 DRY EYE SYNDROME, BILATERAL: ICD-10-CM

## 2023-12-05 DIAGNOSIS — H26.9 CORTICAL CATARACT: ICD-10-CM

## 2023-12-05 DIAGNOSIS — H40.1131 PRIMARY OPEN ANGLE GLAUCOMA (POAG) OF BOTH EYES, MILD STAGE: Primary | ICD-10-CM

## 2023-12-05 PROCEDURE — 99999 PR PBB SHADOW E&M-EST. PATIENT-LVL III: CPT | Mod: PBBFAC,,, | Performed by: OPTOMETRIST

## 2023-12-05 PROCEDURE — 99214 PR OFFICE/OUTPT VISIT, EST, LEVL IV, 30-39 MIN: ICD-10-PCS | Mod: S$PBB,,, | Performed by: OPTOMETRIST

## 2023-12-05 PROCEDURE — 99214 OFFICE O/P EST MOD 30 MIN: CPT | Mod: S$PBB,,, | Performed by: OPTOMETRIST

## 2023-12-05 PROCEDURE — 99999 PR PBB SHADOW E&M-EST. PATIENT-LVL III: ICD-10-PCS | Mod: PBBFAC,,, | Performed by: OPTOMETRIST

## 2023-12-05 PROCEDURE — 99213 OFFICE O/P EST LOW 20 MIN: CPT | Mod: PBBFAC,PO | Performed by: OPTOMETRIST

## 2023-12-05 NOTE — PROGRESS NOTES
HPI    Pt here today for IOP check for POAG - OU.      Denies any headaches, eye pain or pressure.    Good compliance with gtts:  Latanoprost OU qhs    (+) dry eyes -- burning, irritation  (+) Ivizia gtts OU bid  Last edited by Frida Rhodes on 12/5/2023 10:46 AM.            Assessment /Plan     For exam results, see Encounter Report.    Primary open angle glaucoma (POAG) of both eyes, mild stage    Nuclear sclerosis, bilateral    Cortical cataract    Dry eye syndrome, bilateral      1. Primary open angle glaucoma (POAG) of both eyes, mild stage  TMax 18/17   /527  HVF/OCT 03/2023    IOP stable with use of drops  Continue latanoprost qPM OU    RTC in 4 months for hvf / oct / iop / dfe    2. Nuclear sclerosis, bilateral  3. Cortical cataract  Increasing, approaching visual significance.   Refracted to 20/25- OU   Updated spec rx -- pt has not filled rx yet  Discussed signs/symptoms of cataracts  Refer out for cataract eval if no improvement with new specs    4. ELIEL OU  Discussed ocular affects of dry eyes. Recommend OTC Ivizia artificial tears 4-6 times a day in both eyes.   Add otc gel drop at bedtime, caution transient blurring of vision with use  Discussed chronicity of ELIEL.  RTC if symptoms not alleviated by continued use of artificial tears.

## 2023-12-20 DIAGNOSIS — M25.562 LEFT KNEE PAIN, UNSPECIFIED CHRONICITY: Primary | ICD-10-CM

## 2023-12-20 DIAGNOSIS — M79.642 LEFT HAND PAIN: Primary | ICD-10-CM

## 2024-01-01 ENCOUNTER — HOSPITAL ENCOUNTER (INPATIENT)
Facility: HOSPITAL | Age: 74
LOS: 36 days | DRG: 314 | End: 2024-08-08
Attending: HOSPITALIST | Admitting: HOSPITALIST
Payer: MEDICARE

## 2024-01-01 VITALS
TEMPERATURE: 98 F | OXYGEN SATURATION: 73 % | HEIGHT: 67 IN | BODY MASS INDEX: 44.47 KG/M2 | DIASTOLIC BLOOD PRESSURE: 50 MMHG | RESPIRATION RATE: 12 BRPM | WEIGHT: 283.31 LBS | SYSTOLIC BLOOD PRESSURE: 92 MMHG

## 2024-01-01 DIAGNOSIS — Z51.5 PALLIATIVE CARE ENCOUNTER: ICD-10-CM

## 2024-01-01 DIAGNOSIS — I46.9 CARDIAC ARREST: ICD-10-CM

## 2024-01-01 DIAGNOSIS — R57.9 SHOCK: ICD-10-CM

## 2024-01-01 DIAGNOSIS — R65.20 SEVERE SEPSIS: ICD-10-CM

## 2024-01-01 DIAGNOSIS — R00.0 TACHYCARDIA: ICD-10-CM

## 2024-01-01 DIAGNOSIS — R94.31 QT PROLONGATION: ICD-10-CM

## 2024-01-01 DIAGNOSIS — I48.92 ATRIAL FIBRILLATION AND FLUTTER: ICD-10-CM

## 2024-01-01 DIAGNOSIS — I48.91 ATRIAL FIBRILLATION: ICD-10-CM

## 2024-01-01 DIAGNOSIS — I50.43 ACUTE ON CHRONIC COMBINED SYSTOLIC AND DIASTOLIC HEART FAILURE: ICD-10-CM

## 2024-01-01 DIAGNOSIS — J96.91 HYPOXIC RESPIRATORY FAILURE: ICD-10-CM

## 2024-01-01 DIAGNOSIS — A49.8 CLOSTRIDIUM DIFFICILE INFECTION: ICD-10-CM

## 2024-01-01 DIAGNOSIS — R53.81 DEBILITY: ICD-10-CM

## 2024-01-01 DIAGNOSIS — N17.9 AKI (ACUTE KIDNEY INJURY): ICD-10-CM

## 2024-01-01 DIAGNOSIS — Z71.89 ACP (ADVANCE CARE PLANNING): ICD-10-CM

## 2024-01-01 DIAGNOSIS — A41.9 SEPTIC SHOCK: ICD-10-CM

## 2024-01-01 DIAGNOSIS — I12.9 CKD STAGE 4 SECONDARY TO HYPERTENSION: ICD-10-CM

## 2024-01-01 DIAGNOSIS — N18.6 ESRD (END STAGE RENAL DISEASE): Primary | ICD-10-CM

## 2024-01-01 DIAGNOSIS — I10 ESSENTIAL HYPERTENSION: ICD-10-CM

## 2024-01-01 DIAGNOSIS — J96.01 ACUTE RESPIRATORY FAILURE WITH HYPOXIA: ICD-10-CM

## 2024-01-01 DIAGNOSIS — R07.9 CHEST PAIN: ICD-10-CM

## 2024-01-01 DIAGNOSIS — I46.9 ASYSTOLE: ICD-10-CM

## 2024-01-01 DIAGNOSIS — R65.21 SEPTIC SHOCK: ICD-10-CM

## 2024-01-01 DIAGNOSIS — A41.9 SEVERE SEPSIS: ICD-10-CM

## 2024-01-01 DIAGNOSIS — N18.4 CKD STAGE 4 SECONDARY TO HYPERTENSION: ICD-10-CM

## 2024-01-01 DIAGNOSIS — I48.91 ATRIAL FIBRILLATION WITH RVR: ICD-10-CM

## 2024-01-01 DIAGNOSIS — I48.91 ATRIAL FIBRILLATION AND FLUTTER: ICD-10-CM

## 2024-01-01 LAB
ABO + RH BLD: NORMAL
ABO + RH BLD: NORMAL
ACANTHOCYTES BLD QL SMEAR: PRESENT
ACANTHOCYTES BLD QL SMEAR: PRESENT
ALBUMIN SERPL BCP-MCNC: 1.2 G/DL (ref 3.5–5.2)
ALBUMIN SERPL BCP-MCNC: 1.2 G/DL (ref 3.5–5.2)
ALBUMIN SERPL BCP-MCNC: 1.3 G/DL (ref 3.5–5.2)
ALBUMIN SERPL BCP-MCNC: 1.4 G/DL (ref 3.5–5.2)
ALBUMIN SERPL BCP-MCNC: 1.4 G/DL (ref 3.5–5.2)
ALBUMIN SERPL BCP-MCNC: 1.5 G/DL (ref 3.5–5.2)
ALBUMIN SERPL BCP-MCNC: 1.6 G/DL (ref 3.5–5.2)
ALBUMIN SERPL BCP-MCNC: 1.7 G/DL (ref 3.5–5.2)
ALBUMIN SERPL BCP-MCNC: 1.7 G/DL (ref 3.5–5.2)
ALBUMIN SERPL BCP-MCNC: 1.8 G/DL (ref 3.5–5.2)
ALBUMIN SERPL BCP-MCNC: 1.9 G/DL (ref 3.5–5.2)
ALBUMIN SERPL BCP-MCNC: 2 G/DL (ref 3.5–5.2)
ALBUMIN SERPL BCP-MCNC: 2 G/DL (ref 3.5–5.2)
ALBUMIN SERPL BCP-MCNC: 2.1 G/DL (ref 3.5–5.2)
ALBUMIN SERPL BCP-MCNC: 2.2 G/DL (ref 3.5–5.2)
ALBUMIN SERPL BCP-MCNC: 2.3 G/DL (ref 3.5–5.2)
ALBUMIN SERPL BCP-MCNC: 2.3 G/DL (ref 3.5–5.2)
ALBUMIN SERPL BCP-MCNC: 2.4 G/DL (ref 3.5–5.2)
ALBUMIN SERPL BCP-MCNC: 2.4 G/DL (ref 3.5–5.2)
ALBUMIN SERPL BCP-MCNC: 2.5 G/DL (ref 3.5–5.2)
ALBUMIN SERPL BCP-MCNC: 2.5 G/DL (ref 3.5–5.2)
ALBUMIN SERPL BCP-MCNC: 2.6 G/DL (ref 3.5–5.2)
ALBUMIN SERPL BCP-MCNC: 2.7 G/DL (ref 3.5–5.2)
ALBUMIN SERPL BCP-MCNC: 2.8 G/DL (ref 3.5–5.2)
ALBUMIN SERPL BCP-MCNC: 2.8 G/DL (ref 3.5–5.2)
ALBUMIN SERPL BCP-MCNC: 2.9 G/DL (ref 3.5–5.2)
ALBUMIN SERPL BCP-MCNC: 3.2 G/DL (ref 3.5–5.2)
ALBUMIN SERPL BCP-MCNC: 3.2 G/DL (ref 3.5–5.2)
ALBUMIN SERPL BCP-MCNC: 3.5 G/DL (ref 3.5–5.2)
ALLENS TEST: ABNORMAL
ALP SERPL-CCNC: 100 U/L (ref 55–135)
ALP SERPL-CCNC: 101 U/L (ref 55–135)
ALP SERPL-CCNC: 102 U/L (ref 55–135)
ALP SERPL-CCNC: 114 U/L (ref 55–135)
ALP SERPL-CCNC: 123 U/L (ref 55–135)
ALP SERPL-CCNC: 128 U/L (ref 55–135)
ALP SERPL-CCNC: 133 U/L (ref 55–135)
ALP SERPL-CCNC: 144 U/L (ref 55–135)
ALP SERPL-CCNC: 147 U/L (ref 55–135)
ALP SERPL-CCNC: 148 U/L (ref 55–135)
ALP SERPL-CCNC: 152 U/L (ref 55–135)
ALP SERPL-CCNC: 152 U/L (ref 55–135)
ALP SERPL-CCNC: 153 U/L (ref 55–135)
ALP SERPL-CCNC: 154 U/L (ref 55–135)
ALP SERPL-CCNC: 154 U/L (ref 55–135)
ALP SERPL-CCNC: 156 U/L (ref 55–135)
ALP SERPL-CCNC: 159 U/L (ref 55–135)
ALP SERPL-CCNC: 160 U/L (ref 55–135)
ALP SERPL-CCNC: 169 U/L (ref 55–135)
ALP SERPL-CCNC: 174 U/L (ref 55–135)
ALP SERPL-CCNC: 175 U/L (ref 55–135)
ALP SERPL-CCNC: 176 U/L (ref 55–135)
ALP SERPL-CCNC: 187 U/L (ref 55–135)
ALP SERPL-CCNC: 97 U/L (ref 55–135)
ALP SERPL-CCNC: 99 U/L (ref 55–135)
ALT SERPL W/O P-5'-P-CCNC: 11 U/L (ref 10–44)
ALT SERPL W/O P-5'-P-CCNC: 11 U/L (ref 10–44)
ALT SERPL W/O P-5'-P-CCNC: 12 U/L (ref 10–44)
ALT SERPL W/O P-5'-P-CCNC: 13 U/L (ref 10–44)
ALT SERPL W/O P-5'-P-CCNC: 15 U/L (ref 10–44)
ALT SERPL W/O P-5'-P-CCNC: 15 U/L (ref 10–44)
ALT SERPL W/O P-5'-P-CCNC: 18 U/L (ref 10–44)
ALT SERPL W/O P-5'-P-CCNC: 21 U/L (ref 10–44)
ALT SERPL W/O P-5'-P-CCNC: 31 U/L (ref 10–44)
ALT SERPL W/O P-5'-P-CCNC: 32 U/L (ref 10–44)
ALT SERPL W/O P-5'-P-CCNC: 43 U/L (ref 10–44)
ALT SERPL W/O P-5'-P-CCNC: 5 U/L (ref 10–44)
ALT SERPL W/O P-5'-P-CCNC: 55 U/L (ref 10–44)
ALT SERPL W/O P-5'-P-CCNC: 56 U/L (ref 10–44)
ALT SERPL W/O P-5'-P-CCNC: 59 U/L (ref 10–44)
ALT SERPL W/O P-5'-P-CCNC: 6 U/L (ref 10–44)
ALT SERPL W/O P-5'-P-CCNC: 6 U/L (ref 10–44)
ALT SERPL W/O P-5'-P-CCNC: 65 U/L (ref 10–44)
ALT SERPL W/O P-5'-P-CCNC: 67 U/L (ref 10–44)
ALT SERPL W/O P-5'-P-CCNC: 7 U/L (ref 10–44)
ALT SERPL W/O P-5'-P-CCNC: 72 U/L (ref 10–44)
ALT SERPL W/O P-5'-P-CCNC: <5 U/L (ref 10–44)
AMMONIA PLAS-SCNC: 100 UMOL/L (ref 10–50)
AMMONIA PLAS-SCNC: 36 UMOL/L (ref 10–50)
AMMONIA PLAS-SCNC: 52 UMOL/L (ref 10–50)
ANION GAP SERPL CALC-SCNC: 10 MMOL/L (ref 8–16)
ANION GAP SERPL CALC-SCNC: 11 MMOL/L (ref 8–16)
ANION GAP SERPL CALC-SCNC: 12 MMOL/L (ref 8–16)
ANION GAP SERPL CALC-SCNC: 13 MMOL/L (ref 8–16)
ANION GAP SERPL CALC-SCNC: 14 MMOL/L (ref 8–16)
ANION GAP SERPL CALC-SCNC: 15 MMOL/L (ref 8–16)
ANION GAP SERPL CALC-SCNC: 16 MMOL/L (ref 8–16)
ANION GAP SERPL CALC-SCNC: 17 MMOL/L (ref 8–16)
ANION GAP SERPL CALC-SCNC: 18 MMOL/L (ref 8–16)
ANION GAP SERPL CALC-SCNC: 21 MMOL/L (ref 8–16)
ANION GAP SERPL CALC-SCNC: 25 MMOL/L (ref 8–16)
ANION GAP SERPL CALC-SCNC: 4 MMOL/L (ref 8–16)
ANION GAP SERPL CALC-SCNC: 5 MMOL/L (ref 8–16)
ANION GAP SERPL CALC-SCNC: 6 MMOL/L (ref 8–16)
ANION GAP SERPL CALC-SCNC: 7 MMOL/L (ref 8–16)
ANION GAP SERPL CALC-SCNC: 8 MMOL/L (ref 8–16)
ANION GAP SERPL CALC-SCNC: 9 MMOL/L (ref 8–16)
ANISOCYTOSIS BLD QL SMEAR: ABNORMAL
ANISOCYTOSIS BLD QL SMEAR: SLIGHT
APPEARANCE FLD: CLEAR
APPEARANCE FLD: CLEAR
APTT PPP: 103.4 SEC (ref 21–32)
APTT PPP: 109.2 SEC (ref 21–32)
APTT PPP: 128 SEC (ref 21–32)
APTT PPP: 24.2 SEC (ref 21–32)
APTT PPP: 25.9 SEC (ref 21–32)
APTT PPP: 28.1 SEC (ref 21–32)
APTT PPP: 28.2 SEC (ref 21–32)
APTT PPP: 30.5 SEC (ref 21–32)
APTT PPP: 30.6 SEC (ref 21–32)
APTT PPP: 31.2 SEC (ref 21–32)
APTT PPP: 32 SEC (ref 21–32)
APTT PPP: 32.3 SEC (ref 21–32)
APTT PPP: 33.1 SEC (ref 21–32)
APTT PPP: 33.4 SEC (ref 21–32)
APTT PPP: 34 SEC (ref 21–32)
APTT PPP: 34.3 SEC (ref 21–32)
APTT PPP: 35 SEC (ref 21–32)
APTT PPP: 35.2 SEC (ref 21–32)
APTT PPP: 36.6 SEC (ref 21–32)
APTT PPP: 37.3 SEC (ref 21–32)
APTT PPP: 37.4 SEC (ref 21–32)
APTT PPP: 40.7 SEC (ref 21–32)
APTT PPP: 40.8 SEC (ref 21–32)
APTT PPP: 40.9 SEC (ref 21–32)
APTT PPP: 42.4 SEC (ref 21–32)
APTT PPP: 42.7 SEC (ref 21–32)
APTT PPP: 43.2 SEC (ref 21–32)
APTT PPP: 43.9 SEC (ref 21–32)
APTT PPP: 44.1 SEC (ref 21–32)
APTT PPP: 45 SEC (ref 21–32)
APTT PPP: 49.5 SEC (ref 21–32)
APTT PPP: 51 SEC (ref 21–32)
APTT PPP: 52.8 SEC (ref 21–32)
APTT PPP: 53.2 SEC (ref 21–32)
APTT PPP: 53.4 SEC (ref 21–32)
APTT PPP: 53.5 SEC (ref 21–32)
APTT PPP: 55.2 SEC (ref 21–32)
APTT PPP: 58.1 SEC (ref 21–32)
APTT PPP: 58.8 SEC (ref 21–32)
APTT PPP: 59.3 SEC (ref 21–32)
APTT PPP: 60.2 SEC (ref 21–32)
APTT PPP: 61.4 SEC (ref 21–32)
APTT PPP: 62.1 SEC (ref 21–32)
APTT PPP: 64.6 SEC (ref 21–32)
APTT PPP: 65 SEC (ref 21–32)
APTT PPP: 65.1 SEC (ref 21–32)
APTT PPP: 65.4 SEC (ref 21–32)
APTT PPP: 67.4 SEC (ref 21–32)
APTT PPP: 67.9 SEC (ref 21–32)
APTT PPP: 69.6 SEC (ref 21–32)
APTT PPP: 72.3 SEC (ref 21–32)
APTT PPP: 73.6 SEC (ref 21–32)
APTT PPP: 77.5 SEC (ref 21–32)
APTT PPP: 77.9 SEC (ref 21–32)
APTT PPP: 81.7 SEC (ref 21–32)
APTT PPP: 83.5 SEC (ref 21–32)
APTT PPP: 87.6 SEC (ref 21–32)
APTT PPP: 88.1 SEC (ref 21–32)
APTT PPP: 92.8 SEC (ref 21–32)
APTT PPP: 96.8 SEC (ref 21–32)
APTT PPP: >150 SEC (ref 21–32)
ASCENDING AORTA: 3.04 CM
ASCENDING AORTA: 3.36 CM
AST SERPL-CCNC: 13 U/L (ref 10–40)
AST SERPL-CCNC: 14 U/L (ref 10–40)
AST SERPL-CCNC: 15 U/L (ref 10–40)
AST SERPL-CCNC: 16 U/L (ref 10–40)
AST SERPL-CCNC: 17 U/L (ref 10–40)
AST SERPL-CCNC: 17 U/L (ref 10–40)
AST SERPL-CCNC: 22 U/L (ref 10–40)
AST SERPL-CCNC: 23 U/L (ref 10–40)
AST SERPL-CCNC: 27 U/L (ref 10–40)
AST SERPL-CCNC: 27 U/L (ref 10–40)
AST SERPL-CCNC: 28 U/L (ref 10–40)
AST SERPL-CCNC: 30 U/L (ref 10–40)
AST SERPL-CCNC: 30 U/L (ref 10–40)
AST SERPL-CCNC: 32 U/L (ref 10–40)
AST SERPL-CCNC: 38 U/L (ref 10–40)
AST SERPL-CCNC: 47 U/L (ref 10–40)
AST SERPL-CCNC: 53 U/L (ref 10–40)
AST SERPL-CCNC: 65 U/L (ref 10–40)
AST SERPL-CCNC: 76 U/L (ref 10–40)
AST SERPL-CCNC: 84 U/L (ref 10–40)
AST SERPL-CCNC: 97 U/L (ref 10–40)
AV INDEX (PROSTH): 0.71
AV INDEX (PROSTH): 0.72
AV MEAN GRADIENT: 3 MMHG
AV MEAN GRADIENT: 8 MMHG
AV PEAK GRADIENT: 13 MMHG
AV PEAK GRADIENT: 6 MMHG
AV VALVE AREA BY VELOCITY RATIO: 2.45 CM²
AV VALVE AREA BY VELOCITY RATIO: 3.47 CM²
AV VALVE AREA: 2.38 CM²
AV VALVE AREA: 3.6 CM²
AV VELOCITY RATIO: 0.69
AV VELOCITY RATIO: 0.74
BACTERIA BLD CULT: NORMAL
BACTERIA CATH TIP CULT: NO GROWTH
BACTERIA SPEC AEROBE CULT: ABNORMAL
BACTERIA SPEC AEROBE CULT: NO GROWTH
BACTERIA SPEC AEROBE CULT: NO GROWTH
BACTERIA STL CULT: NORMAL
BASO STIPL BLD QL SMEAR: ABNORMAL
BASOPHILS # BLD AUTO: 0.03 K/UL (ref 0–0.2)
BASOPHILS # BLD AUTO: 0.04 K/UL (ref 0–0.2)
BASOPHILS # BLD AUTO: 0.05 K/UL (ref 0–0.2)
BASOPHILS # BLD AUTO: 0.06 K/UL (ref 0–0.2)
BASOPHILS # BLD AUTO: 0.07 K/UL (ref 0–0.2)
BASOPHILS # BLD AUTO: 0.08 K/UL (ref 0–0.2)
BASOPHILS # BLD AUTO: 0.1 K/UL (ref 0–0.2)
BASOPHILS # BLD AUTO: 0.11 K/UL (ref 0–0.2)
BASOPHILS # BLD AUTO: 0.13 K/UL (ref 0–0.2)
BASOPHILS # BLD AUTO: 0.14 K/UL (ref 0–0.2)
BASOPHILS # BLD AUTO: 0.17 K/UL (ref 0–0.2)
BASOPHILS # BLD AUTO: ABNORMAL K/UL (ref 0–0.2)
BASOPHILS NFR BLD: 0 % (ref 0–1.9)
BASOPHILS NFR BLD: 0.1 % (ref 0–1.9)
BASOPHILS NFR BLD: 0.2 % (ref 0–1.9)
BASOPHILS NFR BLD: 0.3 % (ref 0–1.9)
BASOPHILS NFR BLD: 0.4 % (ref 0–1.9)
BASOPHILS NFR BLD: 0.5 % (ref 0–1.9)
BASOPHILS NFR BLD: 0.6 % (ref 0–1.9)
BASOPHILS NFR BLD: 0.6 % (ref 0–1.9)
BASOPHILS NFR BLD: 0.7 % (ref 0–1.9)
BASOPHILS NFR BLD: 0.7 % (ref 0–1.9)
BASOPHILS NFR BLD: 0.8 % (ref 0–1.9)
BILIRUB DIRECT SERPL-MCNC: 3.9 MG/DL (ref 0.1–0.3)
BILIRUB SERPL-MCNC: 0.7 MG/DL (ref 0.1–1)
BILIRUB SERPL-MCNC: 0.7 MG/DL (ref 0.1–1)
BILIRUB SERPL-MCNC: 0.8 MG/DL (ref 0.1–1)
BILIRUB SERPL-MCNC: 0.9 MG/DL (ref 0.1–1)
BILIRUB SERPL-MCNC: 1 MG/DL (ref 0.1–1)
BILIRUB SERPL-MCNC: 1.1 MG/DL (ref 0.1–1)
BILIRUB SERPL-MCNC: 1.3 MG/DL (ref 0.1–1)
BILIRUB SERPL-MCNC: 1.4 MG/DL (ref 0.1–1)
BILIRUB SERPL-MCNC: 1.4 MG/DL (ref 0.1–1)
BILIRUB SERPL-MCNC: 1.7 MG/DL (ref 0.1–1)
BILIRUB SERPL-MCNC: 2.3 MG/DL (ref 0.1–1)
BILIRUB SERPL-MCNC: 4.7 MG/DL (ref 0.1–1)
BLD GP AB SCN CELLS X3 SERPL QL: NORMAL
BLD GP AB SCN CELLS X3 SERPL QL: NORMAL
BLD PROD TYP BPU: NORMAL
BLOOD UNIT EXPIRATION DATE: NORMAL
BLOOD UNIT TYPE CODE: 6200
BLOOD UNIT TYPE: NORMAL
BODY FLD TYPE: NORMAL
BODY FLD TYPE: NORMAL
BSA FOR ECHO PROCEDURE: 2.46 M2
BSA FOR ECHO PROCEDURE: 2.54 M2
BUN SERPL-MCNC: 10 MG/DL (ref 8–23)
BUN SERPL-MCNC: 10 MG/DL (ref 8–23)
BUN SERPL-MCNC: 102 MG/DL (ref 8–23)
BUN SERPL-MCNC: 11 MG/DL (ref 8–23)
BUN SERPL-MCNC: 14 MG/DL (ref 8–23)
BUN SERPL-MCNC: 14 MG/DL (ref 8–23)
BUN SERPL-MCNC: 15 MG/DL (ref 8–23)
BUN SERPL-MCNC: 15 MG/DL (ref 8–23)
BUN SERPL-MCNC: 16 MG/DL (ref 8–23)
BUN SERPL-MCNC: 17 MG/DL (ref 8–23)
BUN SERPL-MCNC: 18 MG/DL (ref 8–23)
BUN SERPL-MCNC: 20 MG/DL (ref 8–23)
BUN SERPL-MCNC: 21 MG/DL (ref 8–23)
BUN SERPL-MCNC: 21 MG/DL (ref 8–23)
BUN SERPL-MCNC: 22 MG/DL (ref 8–23)
BUN SERPL-MCNC: 24 MG/DL (ref 8–23)
BUN SERPL-MCNC: 26 MG/DL (ref 8–23)
BUN SERPL-MCNC: 26 MG/DL (ref 8–23)
BUN SERPL-MCNC: 28 MG/DL (ref 8–23)
BUN SERPL-MCNC: 28 MG/DL (ref 8–23)
BUN SERPL-MCNC: 29 MG/DL (ref 8–23)
BUN SERPL-MCNC: 29 MG/DL (ref 8–23)
BUN SERPL-MCNC: 30 MG/DL (ref 8–23)
BUN SERPL-MCNC: 31 MG/DL (ref 8–23)
BUN SERPL-MCNC: 32 MG/DL (ref 8–23)
BUN SERPL-MCNC: 32 MG/DL (ref 8–23)
BUN SERPL-MCNC: 33 MG/DL (ref 8–23)
BUN SERPL-MCNC: 36 MG/DL (ref 8–23)
BUN SERPL-MCNC: 36 MG/DL (ref 8–23)
BUN SERPL-MCNC: 41 MG/DL (ref 8–23)
BUN SERPL-MCNC: 41 MG/DL (ref 8–23)
BUN SERPL-MCNC: 42 MG/DL (ref 8–23)
BUN SERPL-MCNC: 43 MG/DL (ref 8–23)
BUN SERPL-MCNC: 43 MG/DL (ref 8–23)
BUN SERPL-MCNC: 45 MG/DL (ref 8–23)
BUN SERPL-MCNC: 46 MG/DL (ref 8–23)
BUN SERPL-MCNC: 47 MG/DL (ref 8–23)
BUN SERPL-MCNC: 49 MG/DL (ref 8–23)
BUN SERPL-MCNC: 5 MG/DL (ref 8–23)
BUN SERPL-MCNC: 50 MG/DL (ref 8–23)
BUN SERPL-MCNC: 55 MG/DL (ref 8–23)
BUN SERPL-MCNC: 57 MG/DL (ref 8–23)
BUN SERPL-MCNC: 57 MG/DL (ref 8–23)
BUN SERPL-MCNC: 6 MG/DL (ref 8–23)
BUN SERPL-MCNC: 6 MG/DL (ref 8–23)
BUN SERPL-MCNC: 60 MG/DL (ref 8–23)
BUN SERPL-MCNC: 61 MG/DL (ref 8–23)
BUN SERPL-MCNC: 62 MG/DL (ref 8–23)
BUN SERPL-MCNC: 64 MG/DL (ref 8–23)
BUN SERPL-MCNC: 66 MG/DL (ref 8–23)
BUN SERPL-MCNC: 66 MG/DL (ref 8–23)
BUN SERPL-MCNC: 69 MG/DL (ref 8–23)
BUN SERPL-MCNC: 72 MG/DL (ref 8–23)
BUN SERPL-MCNC: 87 MG/DL (ref 8–23)
BUN SERPL-MCNC: 88 MG/DL (ref 8–23)
BUN SERPL-MCNC: 9 MG/DL (ref 8–23)
BUN SERPL-MCNC: 90 MG/DL (ref 8–23)
BUN SERPL-MCNC: 90 MG/DL (ref 8–23)
BUN SERPL-MCNC: 91 MG/DL (ref 8–23)
BUN SERPL-MCNC: 92 MG/DL (ref 8–23)
BUN SERPL-MCNC: 94 MG/DL (ref 8–23)
BURR CELLS BLD QL SMEAR: ABNORMAL
C DIFF GDH STL QL: POSITIVE
C DIFF TOX A+B STL QL IA: POSITIVE
CA-I BLDV-SCNC: 1.02 MMOL/L (ref 1.06–1.42)
CALCIUM SERPL-MCNC: 6.4 MG/DL (ref 8.7–10.5)
CALCIUM SERPL-MCNC: 7.3 MG/DL (ref 8.7–10.5)
CALCIUM SERPL-MCNC: 7.4 MG/DL (ref 8.7–10.5)
CALCIUM SERPL-MCNC: 7.4 MG/DL (ref 8.7–10.5)
CALCIUM SERPL-MCNC: 7.5 MG/DL (ref 8.7–10.5)
CALCIUM SERPL-MCNC: 7.6 MG/DL (ref 8.7–10.5)
CALCIUM SERPL-MCNC: 7.7 MG/DL (ref 8.7–10.5)
CALCIUM SERPL-MCNC: 7.8 MG/DL (ref 8.7–10.5)
CALCIUM SERPL-MCNC: 7.9 MG/DL (ref 8.7–10.5)
CALCIUM SERPL-MCNC: 7.9 MG/DL (ref 8.7–10.5)
CALCIUM SERPL-MCNC: 8 MG/DL (ref 8.7–10.5)
CALCIUM SERPL-MCNC: 8.1 MG/DL (ref 8.7–10.5)
CALCIUM SERPL-MCNC: 8.2 MG/DL (ref 8.7–10.5)
CALCIUM SERPL-MCNC: 8.3 MG/DL (ref 8.7–10.5)
CALCIUM SERPL-MCNC: 8.4 MG/DL (ref 8.7–10.5)
CALCIUM SERPL-MCNC: 8.5 MG/DL (ref 8.7–10.5)
CALCIUM SERPL-MCNC: 8.6 MG/DL (ref 8.7–10.5)
CALCIUM SERPL-MCNC: 8.7 MG/DL (ref 8.7–10.5)
CALCIUM SERPL-MCNC: 8.8 MG/DL (ref 8.7–10.5)
CALCIUM SERPL-MCNC: 8.9 MG/DL (ref 8.7–10.5)
CALCIUM SERPL-MCNC: 9 MG/DL (ref 8.7–10.5)
CALCIUM SERPL-MCNC: 9.1 MG/DL (ref 8.7–10.5)
CALCIUM SERPL-MCNC: 9.2 MG/DL (ref 8.7–10.5)
CALCIUM SERPL-MCNC: 9.2 MG/DL (ref 8.7–10.5)
CALCIUM SERPL-MCNC: 9.3 MG/DL (ref 8.7–10.5)
CALCIUM SERPL-MCNC: 9.5 MG/DL (ref 8.7–10.5)
CALCIUM SERPL-MCNC: 9.5 MG/DL (ref 8.7–10.5)
CALCIUM SERPL-MCNC: 9.6 MG/DL (ref 8.7–10.5)
CALCIUM SERPL-MCNC: 9.6 MG/DL (ref 8.7–10.5)
CALCIUM SERPL-MCNC: 9.7 MG/DL (ref 8.7–10.5)
CHLORIDE SERPL-SCNC: 100 MMOL/L (ref 95–110)
CHLORIDE SERPL-SCNC: 101 MMOL/L (ref 95–110)
CHLORIDE SERPL-SCNC: 102 MMOL/L (ref 95–110)
CHLORIDE SERPL-SCNC: 103 MMOL/L (ref 95–110)
CHLORIDE SERPL-SCNC: 103 MMOL/L (ref 95–110)
CHLORIDE SERPL-SCNC: 104 MMOL/L (ref 95–110)
CHLORIDE SERPL-SCNC: 105 MMOL/L (ref 95–110)
CHLORIDE SERPL-SCNC: 106 MMOL/L (ref 95–110)
CHLORIDE SERPL-SCNC: 107 MMOL/L (ref 95–110)
CHLORIDE SERPL-SCNC: 108 MMOL/L (ref 95–110)
CHLORIDE SERPL-SCNC: 109 MMOL/L (ref 95–110)
CHLORIDE SERPL-SCNC: 110 MMOL/L (ref 95–110)
CHLORIDE SERPL-SCNC: 111 MMOL/L (ref 95–110)
CHLORIDE SERPL-SCNC: 114 MMOL/L (ref 95–110)
CHLORIDE SERPL-SCNC: 99 MMOL/L (ref 95–110)
CHLORIDE SERPL-SCNC: 99 MMOL/L (ref 95–110)
CO2 SERPL-SCNC: 10 MMOL/L (ref 23–29)
CO2 SERPL-SCNC: 12 MMOL/L (ref 23–29)
CO2 SERPL-SCNC: 13 MMOL/L (ref 23–29)
CO2 SERPL-SCNC: 14 MMOL/L (ref 23–29)
CO2 SERPL-SCNC: 15 MMOL/L (ref 23–29)
CO2 SERPL-SCNC: 15 MMOL/L (ref 23–29)
CO2 SERPL-SCNC: 16 MMOL/L (ref 23–29)
CO2 SERPL-SCNC: 17 MMOL/L (ref 23–29)
CO2 SERPL-SCNC: 18 MMOL/L (ref 23–29)
CO2 SERPL-SCNC: 19 MMOL/L (ref 23–29)
CO2 SERPL-SCNC: 20 MMOL/L (ref 23–29)
CO2 SERPL-SCNC: 21 MMOL/L (ref 23–29)
CO2 SERPL-SCNC: 22 MMOL/L (ref 23–29)
CO2 SERPL-SCNC: 23 MMOL/L (ref 23–29)
CO2 SERPL-SCNC: 23 MMOL/L (ref 23–29)
CO2 SERPL-SCNC: 24 MMOL/L (ref 23–29)
CO2 SERPL-SCNC: 25 MMOL/L (ref 23–29)
CODING SYSTEM: NORMAL
COLOR FLD: COLORLESS
COLOR FLD: YELLOW
CREAT SERPL-MCNC: 1 MG/DL (ref 0.5–1.4)
CREAT SERPL-MCNC: 1.1 MG/DL (ref 0.5–1.4)
CREAT SERPL-MCNC: 1.2 MG/DL (ref 0.5–1.4)
CREAT SERPL-MCNC: 1.4 MG/DL (ref 0.5–1.4)
CREAT SERPL-MCNC: 1.5 MG/DL (ref 0.5–1.4)
CREAT SERPL-MCNC: 1.6 MG/DL (ref 0.5–1.4)
CREAT SERPL-MCNC: 1.7 MG/DL (ref 0.5–1.4)
CREAT SERPL-MCNC: 1.8 MG/DL (ref 0.5–1.4)
CREAT SERPL-MCNC: 1.9 MG/DL (ref 0.5–1.4)
CREAT SERPL-MCNC: 2.1 MG/DL (ref 0.5–1.4)
CREAT SERPL-MCNC: 2.1 MG/DL (ref 0.5–1.4)
CREAT SERPL-MCNC: 2.2 MG/DL (ref 0.5–1.4)
CREAT SERPL-MCNC: 2.3 MG/DL (ref 0.5–1.4)
CREAT SERPL-MCNC: 2.4 MG/DL (ref 0.5–1.4)
CREAT SERPL-MCNC: 2.5 MG/DL (ref 0.5–1.4)
CREAT SERPL-MCNC: 2.6 MG/DL (ref 0.5–1.4)
CREAT SERPL-MCNC: 3.1 MG/DL (ref 0.5–1.4)
CREAT SERPL-MCNC: 3.2 MG/DL (ref 0.5–1.4)
CREAT SERPL-MCNC: 3.3 MG/DL (ref 0.5–1.4)
CREAT SERPL-MCNC: 3.4 MG/DL (ref 0.5–1.4)
CREAT SERPL-MCNC: 3.5 MG/DL (ref 0.5–1.4)
CREAT SERPL-MCNC: 3.6 MG/DL (ref 0.5–1.4)
CREAT SERPL-MCNC: 3.6 MG/DL (ref 0.5–1.4)
CREAT SERPL-MCNC: 3.7 MG/DL (ref 0.5–1.4)
CREAT SERPL-MCNC: 4.3 MG/DL (ref 0.5–1.4)
CREAT SERPL-MCNC: 4.5 MG/DL (ref 0.5–1.4)
CREAT SERPL-MCNC: 4.7 MG/DL (ref 0.5–1.4)
CREAT SERPL-MCNC: 5 MG/DL (ref 0.5–1.4)
CREAT SERPL-MCNC: 5.1 MG/DL (ref 0.5–1.4)
CREAT SERPL-MCNC: 5.5 MG/DL (ref 0.5–1.4)
CREAT SERPL-MCNC: 5.5 MG/DL (ref 0.5–1.4)
CREAT SERPL-MCNC: 5.7 MG/DL (ref 0.5–1.4)
CREAT SERPL-MCNC: 5.7 MG/DL (ref 0.5–1.4)
CREAT SERPL-MCNC: 5.8 MG/DL (ref 0.5–1.4)
CREAT SERPL-MCNC: 6.2 MG/DL (ref 0.5–1.4)
CREAT SERPL-MCNC: 6.2 MG/DL (ref 0.5–1.4)
CREAT SERPL-MCNC: 6.3 MG/DL (ref 0.5–1.4)
CREAT SERPL-MCNC: 6.3 MG/DL (ref 0.5–1.4)
CREAT SERPL-MCNC: 6.4 MG/DL (ref 0.5–1.4)
CREAT SERPL-MCNC: 6.5 MG/DL (ref 0.5–1.4)
CREAT SERPL-MCNC: 6.6 MG/DL (ref 0.5–1.4)
CREAT SERPL-MCNC: 6.9 MG/DL (ref 0.5–1.4)
CREAT SERPL-MCNC: 7 MG/DL (ref 0.5–1.4)
CREAT SERPL-MCNC: 7 MG/DL (ref 0.5–1.4)
CREAT SERPL-MCNC: 7.1 MG/DL (ref 0.5–1.4)
CREAT SERPL-MCNC: 7.3 MG/DL (ref 0.5–1.4)
CREAT SERPL-MCNC: 7.6 MG/DL (ref 0.5–1.4)
CREAT SERPL-MCNC: 7.6 MG/DL (ref 0.5–1.4)
CREAT SERPL-MCNC: 8.4 MG/DL (ref 0.5–1.4)
CROSSMATCH INTERPRETATION: NORMAL
CRP SERPL-MCNC: 313.6 MG/L (ref 0–8.2)
CV ECHO LV RWT: 0.28 CM
CV ECHO LV RWT: 0.35 CM
DACRYOCYTES BLD QL SMEAR: ABNORMAL
DACRYOCYTES BLD QL SMEAR: ABNORMAL
DELSYS: ABNORMAL
DIFFERENTIAL METHOD BLD: ABNORMAL
DIGOXIN SERPL-MCNC: 1.1 NG/ML (ref 0.8–2)
DISPENSE STATUS: NORMAL
DOHLE BOD BLD QL SMEAR: PRESENT
DOP CALC AO PEAK VEL: 1.21 M/S
DOP CALC AO PEAK VEL: 1.78 M/S
DOP CALC AO VTI: 19.89 CM
DOP CALC AO VTI: 22.51 CM
DOP CALC LVOT AREA: 3.3 CM2
DOP CALC LVOT AREA: 5 CM2
DOP CALC LVOT DIAMETER: 2.06 CM
DOP CALC LVOT DIAMETER: 2.53 CM
DOP CALC LVOT PEAK VEL: 0.89 M/S
DOP CALC LVOT PEAK VEL: 1.23 M/S
DOP CALC LVOT STROKE VOLUME: 47.37 CM3
DOP CALC LVOT STROKE VOLUME: 81.05 CM3
DOP CALCLVOT PEAK VEL VTI: 14.22 CM
DOP CALCLVOT PEAK VEL VTI: 16.13 CM
E COLI SXT1 STL QL IA: NEGATIVE
E COLI SXT2 STL QL IA: NEGATIVE
E WAVE DECELERATION TIME: 184.67 MSEC
E/E' RATIO: 16.67 M/S
E/E' RATIO: 18.31 M/S
ECHO LV POSTERIOR WALL: 0.85 CM (ref 0.6–1.1)
ECHO LV POSTERIOR WALL: 1.01 CM (ref 0.6–1.1)
EOSINOPHIL # BLD AUTO: 0 K/UL (ref 0–0.5)
EOSINOPHIL # BLD AUTO: 0 K/UL (ref 0–0.5)
EOSINOPHIL # BLD AUTO: 0.1 K/UL (ref 0–0.5)
EOSINOPHIL # BLD AUTO: 0.2 K/UL (ref 0–0.5)
EOSINOPHIL # BLD AUTO: 0.3 K/UL (ref 0–0.5)
EOSINOPHIL # BLD AUTO: ABNORMAL K/UL (ref 0–0.5)
EOSINOPHIL NFR BLD: 0 % (ref 0–8)
EOSINOPHIL NFR BLD: 0.1 % (ref 0–8)
EOSINOPHIL NFR BLD: 0.1 % (ref 0–8)
EOSINOPHIL NFR BLD: 0.2 % (ref 0–8)
EOSINOPHIL NFR BLD: 0.3 % (ref 0–8)
EOSINOPHIL NFR BLD: 0.3 % (ref 0–8)
EOSINOPHIL NFR BLD: 0.4 % (ref 0–8)
EOSINOPHIL NFR BLD: 0.5 % (ref 0–8)
EOSINOPHIL NFR BLD: 0.6 % (ref 0–8)
EOSINOPHIL NFR BLD: 0.7 % (ref 0–8)
EOSINOPHIL NFR BLD: 0.7 % (ref 0–8)
EOSINOPHIL NFR BLD: 0.8 % (ref 0–8)
EOSINOPHIL NFR BLD: 0.9 % (ref 0–8)
EOSINOPHIL NFR BLD: 1.2 % (ref 0–8)
EOSINOPHIL NFR BLD: 1.3 % (ref 0–8)
EOSINOPHIL NFR BLD: 1.4 % (ref 0–8)
EOSINOPHIL NFR BLD: 1.4 % (ref 0–8)
EOSINOPHIL NFR BLD: 1.6 % (ref 0–8)
EOSINOPHIL NFR BLD: 1.8 % (ref 0–8)
EOSINOPHIL NFR BLD: 1.9 % (ref 0–8)
EOSINOPHIL NFR BLD: 2.4 % (ref 0–8)
EOSINOPHIL NFR BLD: 2.4 % (ref 0–8)
EP: 5
EP: 8
ERYTHROCYTE [DISTWIDTH] IN BLOOD BY AUTOMATED COUNT: 20.5 % (ref 11.5–14.5)
ERYTHROCYTE [DISTWIDTH] IN BLOOD BY AUTOMATED COUNT: 20.6 % (ref 11.5–14.5)
ERYTHROCYTE [DISTWIDTH] IN BLOOD BY AUTOMATED COUNT: 20.7 % (ref 11.5–14.5)
ERYTHROCYTE [DISTWIDTH] IN BLOOD BY AUTOMATED COUNT: 20.8 % (ref 11.5–14.5)
ERYTHROCYTE [DISTWIDTH] IN BLOOD BY AUTOMATED COUNT: 20.9 % (ref 11.5–14.5)
ERYTHROCYTE [DISTWIDTH] IN BLOOD BY AUTOMATED COUNT: 20.9 % (ref 11.5–14.5)
ERYTHROCYTE [DISTWIDTH] IN BLOOD BY AUTOMATED COUNT: 21.1 % (ref 11.5–14.5)
ERYTHROCYTE [DISTWIDTH] IN BLOOD BY AUTOMATED COUNT: 21.2 % (ref 11.5–14.5)
ERYTHROCYTE [DISTWIDTH] IN BLOOD BY AUTOMATED COUNT: 21.3 % (ref 11.5–14.5)
ERYTHROCYTE [DISTWIDTH] IN BLOOD BY AUTOMATED COUNT: 21.3 % (ref 11.5–14.5)
ERYTHROCYTE [DISTWIDTH] IN BLOOD BY AUTOMATED COUNT: 21.4 % (ref 11.5–14.5)
ERYTHROCYTE [DISTWIDTH] IN BLOOD BY AUTOMATED COUNT: 21.4 % (ref 11.5–14.5)
ERYTHROCYTE [DISTWIDTH] IN BLOOD BY AUTOMATED COUNT: 21.5 % (ref 11.5–14.5)
ERYTHROCYTE [DISTWIDTH] IN BLOOD BY AUTOMATED COUNT: 21.5 % (ref 11.5–14.5)
ERYTHROCYTE [DISTWIDTH] IN BLOOD BY AUTOMATED COUNT: 21.6 % (ref 11.5–14.5)
ERYTHROCYTE [DISTWIDTH] IN BLOOD BY AUTOMATED COUNT: 21.6 % (ref 11.5–14.5)
ERYTHROCYTE [DISTWIDTH] IN BLOOD BY AUTOMATED COUNT: 21.7 % (ref 11.5–14.5)
ERYTHROCYTE [DISTWIDTH] IN BLOOD BY AUTOMATED COUNT: 22.2 % (ref 11.5–14.5)
ERYTHROCYTE [DISTWIDTH] IN BLOOD BY AUTOMATED COUNT: 22.3 % (ref 11.5–14.5)
ERYTHROCYTE [DISTWIDTH] IN BLOOD BY AUTOMATED COUNT: 22.4 % (ref 11.5–14.5)
ERYTHROCYTE [DISTWIDTH] IN BLOOD BY AUTOMATED COUNT: 22.5 % (ref 11.5–14.5)
ERYTHROCYTE [DISTWIDTH] IN BLOOD BY AUTOMATED COUNT: 22.5 % (ref 11.5–14.5)
ERYTHROCYTE [DISTWIDTH] IN BLOOD BY AUTOMATED COUNT: 22.6 % (ref 11.5–14.5)
ERYTHROCYTE [DISTWIDTH] IN BLOOD BY AUTOMATED COUNT: 22.8 % (ref 11.5–14.5)
ERYTHROCYTE [DISTWIDTH] IN BLOOD BY AUTOMATED COUNT: 22.8 % (ref 11.5–14.5)
ERYTHROCYTE [DISTWIDTH] IN BLOOD BY AUTOMATED COUNT: 22.9 % (ref 11.5–14.5)
ERYTHROCYTE [DISTWIDTH] IN BLOOD BY AUTOMATED COUNT: 23 % (ref 11.5–14.5)
ERYTHROCYTE [DISTWIDTH] IN BLOOD BY AUTOMATED COUNT: 23.1 % (ref 11.5–14.5)
ERYTHROCYTE [DISTWIDTH] IN BLOOD BY AUTOMATED COUNT: 23.2 % (ref 11.5–14.5)
ERYTHROCYTE [DISTWIDTH] IN BLOOD BY AUTOMATED COUNT: 23.6 % (ref 11.5–14.5)
ERYTHROCYTE [DISTWIDTH] IN BLOOD BY AUTOMATED COUNT: 23.8 % (ref 11.5–14.5)
ERYTHROCYTE [DISTWIDTH] IN BLOOD BY AUTOMATED COUNT: 23.9 % (ref 11.5–14.5)
ERYTHROCYTE [SEDIMENTATION RATE] IN BLOOD BY WESTERGREN METHOD: 28 MM/H
ERYTHROCYTE [SEDIMENTATION RATE] IN BLOOD BY WESTERGREN METHOD: 28 MM/H
EST. GFR  (NO RACE VARIABLE): 10.4 ML/MIN/1.73 M^2
EST. GFR  (NO RACE VARIABLE): 12.5 ML/MIN/1.73 M^2
EST. GFR  (NO RACE VARIABLE): 12.9 ML/MIN/1.73 M^2
EST. GFR  (NO RACE VARIABLE): 12.9 ML/MIN/1.73 M^2
EST. GFR  (NO RACE VARIABLE): 13.3 ML/MIN/1.73 M^2
EST. GFR  (NO RACE VARIABLE): 13.8 ML/MIN/1.73 M^2
EST. GFR  (NO RACE VARIABLE): 14.3 ML/MIN/1.73 M^2
EST. GFR  (NO RACE VARIABLE): 14.8 ML/MIN/1.73 M^2
EST. GFR  (NO RACE VARIABLE): 15.4 ML/MIN/1.73 M^2
EST. GFR  (NO RACE VARIABLE): 19 ML/MIN/1.73 M^2
EST. GFR  (NO RACE VARIABLE): 19.9 ML/MIN/1.73 M^2
EST. GFR  (NO RACE VARIABLE): 20.9 ML/MIN/1.73 M^2
EST. GFR  (NO RACE VARIABLE): 22 ML/MIN/1.73 M^2
EST. GFR  (NO RACE VARIABLE): 23.2 ML/MIN/1.73 M^2
EST. GFR  (NO RACE VARIABLE): 24.6 ML/MIN/1.73 M^2
EST. GFR  (NO RACE VARIABLE): 24.6 ML/MIN/1.73 M^2
EST. GFR  (NO RACE VARIABLE): 27.7 ML/MIN/1.73 M^2
EST. GFR  (NO RACE VARIABLE): 29.6 ML/MIN/1.73 M^2
EST. GFR  (NO RACE VARIABLE): 31.7 ML/MIN/1.73 M^2
EST. GFR  (NO RACE VARIABLE): 34.1 ML/MIN/1.73 M^2
EST. GFR  (NO RACE VARIABLE): 36.8 ML/MIN/1.73 M^2
EST. GFR  (NO RACE VARIABLE): 4.7 ML/MIN/1.73 M^2
EST. GFR  (NO RACE VARIABLE): 40 ML/MIN/1.73 M^2
EST. GFR  (NO RACE VARIABLE): 48.1 ML/MIN/1.73 M^2
EST. GFR  (NO RACE VARIABLE): 5.2 ML/MIN/1.73 M^2
EST. GFR  (NO RACE VARIABLE): 5.2 ML/MIN/1.73 M^2
EST. GFR  (NO RACE VARIABLE): 5.5 ML/MIN/1.73 M^2
EST. GFR  (NO RACE VARIABLE): 5.7 ML/MIN/1.73 M^2
EST. GFR  (NO RACE VARIABLE): 5.8 ML/MIN/1.73 M^2
EST. GFR  (NO RACE VARIABLE): 5.8 ML/MIN/1.73 M^2
EST. GFR  (NO RACE VARIABLE): 5.9 ML/MIN/1.73 M^2
EST. GFR  (NO RACE VARIABLE): 53.4 ML/MIN/1.73 M^2
EST. GFR  (NO RACE VARIABLE): 59.9 ML/MIN/1.73 M^2
EST. GFR  (NO RACE VARIABLE): 6.2 ML/MIN/1.73 M^2
EST. GFR  (NO RACE VARIABLE): 6.3 ML/MIN/1.73 M^2
EST. GFR  (NO RACE VARIABLE): 6.5 ML/MIN/1.73 M^2
EST. GFR  (NO RACE VARIABLE): 6.6 ML/MIN/1.73 M^2
EST. GFR  (NO RACE VARIABLE): 6.6 ML/MIN/1.73 M^2
EST. GFR  (NO RACE VARIABLE): 6.7 ML/MIN/1.73 M^2
EST. GFR  (NO RACE VARIABLE): 6.7 ML/MIN/1.73 M^2
EST. GFR  (NO RACE VARIABLE): 7.3 ML/MIN/1.73 M^2
EST. GFR  (NO RACE VARIABLE): 7.4 ML/MIN/1.73 M^2
EST. GFR  (NO RACE VARIABLE): 7.4 ML/MIN/1.73 M^2
EST. GFR  (NO RACE VARIABLE): 7.7 ML/MIN/1.73 M^2
EST. GFR  (NO RACE VARIABLE): 7.7 ML/MIN/1.73 M^2
EST. GFR  (NO RACE VARIABLE): 8.5 ML/MIN/1.73 M^2
EST. GFR  (NO RACE VARIABLE): 8.7 ML/MIN/1.73 M^2
EST. GFR  (NO RACE VARIABLE): 9.3 ML/MIN/1.73 M^2
EST. GFR  (NO RACE VARIABLE): 9.8 ML/MIN/1.73 M^2
F5 P.R506Q BLD/T QL: NEGATIVE
FERRITIN SERPL-MCNC: 156 NG/ML (ref 20–300)
FERRITIN SERPL-MCNC: 169 NG/ML (ref 20–300)
FIBRINOGEN PPP-MCNC: 146 MG/DL (ref 182–400)
FIBRINOGEN PPP-MCNC: 90 MG/DL (ref 182–400)
FIO2: 21
FLOW: 2
FRACTIONAL SHORTENING: 13 % (ref 28–44)
FRACTIONAL SHORTENING: 17 % (ref 28–44)
FUNGUS SPEC CULT: NORMAL
GIANT PLATELETS BLD QL SMEAR: ABNORMAL
GIANT PLATELETS BLD QL SMEAR: PRESENT
GLUCOSE SERPL-MCNC: 100 MG/DL (ref 70–110)
GLUCOSE SERPL-MCNC: 101 MG/DL (ref 70–110)
GLUCOSE SERPL-MCNC: 103 MG/DL (ref 70–110)
GLUCOSE SERPL-MCNC: 103 MG/DL (ref 70–110)
GLUCOSE SERPL-MCNC: 104 MG/DL (ref 70–110)
GLUCOSE SERPL-MCNC: 105 MG/DL (ref 70–110)
GLUCOSE SERPL-MCNC: 105 MG/DL (ref 70–110)
GLUCOSE SERPL-MCNC: 106 MG/DL (ref 70–110)
GLUCOSE SERPL-MCNC: 106 MG/DL (ref 70–110)
GLUCOSE SERPL-MCNC: 107 MG/DL (ref 70–110)
GLUCOSE SERPL-MCNC: 108 MG/DL (ref 70–110)
GLUCOSE SERPL-MCNC: 109 MG/DL (ref 70–110)
GLUCOSE SERPL-MCNC: 110 MG/DL (ref 70–110)
GLUCOSE SERPL-MCNC: 112 MG/DL (ref 70–110)
GLUCOSE SERPL-MCNC: 113 MG/DL (ref 70–110)
GLUCOSE SERPL-MCNC: 113 MG/DL (ref 70–110)
GLUCOSE SERPL-MCNC: 114 MG/DL (ref 70–110)
GLUCOSE SERPL-MCNC: 114 MG/DL (ref 70–110)
GLUCOSE SERPL-MCNC: 115 MG/DL (ref 70–110)
GLUCOSE SERPL-MCNC: 116 MG/DL (ref 70–110)
GLUCOSE SERPL-MCNC: 117 MG/DL (ref 70–110)
GLUCOSE SERPL-MCNC: 117 MG/DL (ref 70–110)
GLUCOSE SERPL-MCNC: 118 MG/DL (ref 70–110)
GLUCOSE SERPL-MCNC: 119 MG/DL (ref 70–110)
GLUCOSE SERPL-MCNC: 121 MG/DL (ref 70–110)
GLUCOSE SERPL-MCNC: 121 MG/DL (ref 70–110)
GLUCOSE SERPL-MCNC: 123 MG/DL (ref 70–110)
GLUCOSE SERPL-MCNC: 128 MG/DL (ref 70–110)
GLUCOSE SERPL-MCNC: 129 MG/DL (ref 70–110)
GLUCOSE SERPL-MCNC: 131 MG/DL (ref 70–110)
GLUCOSE SERPL-MCNC: 132 MG/DL (ref 70–110)
GLUCOSE SERPL-MCNC: 133 MG/DL (ref 70–110)
GLUCOSE SERPL-MCNC: 139 MG/DL (ref 70–110)
GLUCOSE SERPL-MCNC: 141 MG/DL (ref 70–110)
GLUCOSE SERPL-MCNC: 151 MG/DL (ref 70–110)
GLUCOSE SERPL-MCNC: 158 MG/DL (ref 70–110)
GLUCOSE SERPL-MCNC: 167 MG/DL (ref 70–110)
GLUCOSE SERPL-MCNC: 43 MG/DL (ref 70–110)
GLUCOSE SERPL-MCNC: 43 MG/DL (ref 70–110)
GLUCOSE SERPL-MCNC: 57 MG/DL (ref 70–110)
GLUCOSE SERPL-MCNC: 69 MG/DL (ref 70–110)
GLUCOSE SERPL-MCNC: 69 MG/DL (ref 70–110)
GLUCOSE SERPL-MCNC: 76 MG/DL (ref 70–110)
GLUCOSE SERPL-MCNC: 81 MG/DL (ref 70–110)
GLUCOSE SERPL-MCNC: 81 MG/DL (ref 70–110)
GLUCOSE SERPL-MCNC: 83 MG/DL (ref 70–110)
GLUCOSE SERPL-MCNC: 85 MG/DL (ref 70–110)
GLUCOSE SERPL-MCNC: 88 MG/DL (ref 70–110)
GLUCOSE SERPL-MCNC: 89 MG/DL (ref 70–110)
GLUCOSE SERPL-MCNC: 90 MG/DL (ref 70–110)
GLUCOSE SERPL-MCNC: 90 MG/DL (ref 70–110)
GLUCOSE SERPL-MCNC: 91 MG/DL (ref 70–110)
GLUCOSE SERPL-MCNC: 92 MG/DL (ref 70–110)
GLUCOSE SERPL-MCNC: 94 MG/DL (ref 70–110)
GLUCOSE SERPL-MCNC: 95 MG/DL (ref 70–110)
GLUCOSE SERPL-MCNC: 96 MG/DL (ref 70–110)
GLUCOSE SERPL-MCNC: 98 MG/DL (ref 70–110)
GLUCOSE SERPL-MCNC: 98 MG/DL (ref 70–110)
GLUCOSE SERPL-MCNC: 99 MG/DL (ref 70–110)
GLUCOSE SERPL-MCNC: 99 MG/DL (ref 70–110)
GRAM STN SPEC: NORMAL
HAPTOGLOB SERPL-MCNC: 164 MG/DL (ref 30–250)
HBV CORE AB SERPL QL IA: NORMAL
HBV SURFACE AB SER-ACNC: <3 MIU/ML
HBV SURFACE AB SER-ACNC: NORMAL M[IU]/ML
HBV SURFACE AG SERPL QL IA: NORMAL
HCO3 UR-SCNC: 10.6 MMOL/L (ref 24–28)
HCO3 UR-SCNC: 13.7 MMOL/L (ref 24–28)
HCO3 UR-SCNC: 19.5 MMOL/L (ref 24–28)
HCO3 UR-SCNC: 19.5 MMOL/L (ref 24–28)
HCO3 UR-SCNC: 22.1 MMOL/L (ref 24–28)
HCO3 UR-SCNC: 6.8 MMOL/L (ref 24–28)
HCO3 UR-SCNC: 9.8 MMOL/L (ref 24–28)
HCT VFR BLD AUTO: 17.8 % (ref 37–48.5)
HCT VFR BLD AUTO: 22.5 % (ref 37–48.5)
HCT VFR BLD AUTO: 22.8 % (ref 37–48.5)
HCT VFR BLD AUTO: 23.3 % (ref 37–48.5)
HCT VFR BLD AUTO: 23.5 % (ref 37–48.5)
HCT VFR BLD AUTO: 23.6 % (ref 37–48.5)
HCT VFR BLD AUTO: 23.9 % (ref 37–48.5)
HCT VFR BLD AUTO: 24.3 % (ref 37–48.5)
HCT VFR BLD AUTO: 24.7 % (ref 37–48.5)
HCT VFR BLD AUTO: 24.8 % (ref 37–48.5)
HCT VFR BLD AUTO: 24.8 % (ref 37–48.5)
HCT VFR BLD AUTO: 24.9 % (ref 37–48.5)
HCT VFR BLD AUTO: 25 % (ref 37–48.5)
HCT VFR BLD AUTO: 25.2 % (ref 37–48.5)
HCT VFR BLD AUTO: 25.6 % (ref 37–48.5)
HCT VFR BLD AUTO: 26 % (ref 37–48.5)
HCT VFR BLD AUTO: 26.3 % (ref 37–48.5)
HCT VFR BLD AUTO: 26.4 % (ref 37–48.5)
HCT VFR BLD AUTO: 26.6 % (ref 37–48.5)
HCT VFR BLD AUTO: 27.1 % (ref 37–48.5)
HCT VFR BLD AUTO: 27.3 % (ref 37–48.5)
HCT VFR BLD AUTO: 27.5 % (ref 37–48.5)
HCT VFR BLD AUTO: 28.5 % (ref 37–48.5)
HCT VFR BLD AUTO: 30.2 % (ref 37–48.5)
HCT VFR BLD AUTO: 30.5 % (ref 37–48.5)
HCT VFR BLD AUTO: 31.4 % (ref 37–48.5)
HCT VFR BLD AUTO: 31.5 % (ref 37–48.5)
HCT VFR BLD AUTO: 32.3 % (ref 37–48.5)
HCT VFR BLD AUTO: 32.9 % (ref 37–48.5)
HCT VFR BLD AUTO: 32.9 % (ref 37–48.5)
HCT VFR BLD AUTO: 33.2 % (ref 37–48.5)
HCT VFR BLD AUTO: 33.2 % (ref 37–48.5)
HCT VFR BLD AUTO: 33.5 % (ref 37–48.5)
HCT VFR BLD AUTO: 34.8 % (ref 37–48.5)
HCT VFR BLD AUTO: 34.8 % (ref 37–48.5)
HCT VFR BLD AUTO: 35 % (ref 37–48.5)
HCT VFR BLD AUTO: 35.8 % (ref 37–48.5)
HCT VFR BLD AUTO: 36.4 % (ref 37–48.5)
HCT VFR BLD AUTO: 37.6 % (ref 37–48.5)
HCT VFR BLD AUTO: 38 % (ref 37–48.5)
HCT VFR BLD AUTO: 38.3 % (ref 37–48.5)
HCT VFR BLD AUTO: 38.3 % (ref 37–48.5)
HCT VFR BLD AUTO: 39.4 % (ref 37–48.5)
HCT VFR BLD CALC: 27 %PCV (ref 36–54)
HCT VFR BLD CALC: 31 %PCV (ref 36–54)
HGB BLD-MCNC: 10.1 G/DL (ref 12–16)
HGB BLD-MCNC: 10.1 G/DL (ref 12–16)
HGB BLD-MCNC: 10.3 G/DL (ref 12–16)
HGB BLD-MCNC: 10.4 G/DL (ref 12–16)
HGB BLD-MCNC: 10.4 G/DL (ref 12–16)
HGB BLD-MCNC: 10.6 G/DL (ref 12–16)
HGB BLD-MCNC: 10.6 G/DL (ref 12–16)
HGB BLD-MCNC: 10.7 G/DL (ref 12–16)
HGB BLD-MCNC: 10.7 G/DL (ref 12–16)
HGB BLD-MCNC: 10.8 G/DL (ref 12–16)
HGB BLD-MCNC: 10.9 G/DL (ref 12–16)
HGB BLD-MCNC: 10.9 G/DL (ref 12–16)
HGB BLD-MCNC: 11.4 G/DL (ref 12–16)
HGB BLD-MCNC: 11.5 G/DL (ref 12–16)
HGB BLD-MCNC: 11.5 G/DL (ref 12–16)
HGB BLD-MCNC: 5.7 G/DL (ref 12–16)
HGB BLD-MCNC: 6.7 G/DL (ref 12–16)
HGB BLD-MCNC: 6.8 G/DL (ref 12–16)
HGB BLD-MCNC: 7 G/DL (ref 12–16)
HGB BLD-MCNC: 7.2 G/DL (ref 12–16)
HGB BLD-MCNC: 7.3 G/DL (ref 12–16)
HGB BLD-MCNC: 7.3 G/DL (ref 12–16)
HGB BLD-MCNC: 7.4 G/DL (ref 12–16)
HGB BLD-MCNC: 7.5 G/DL (ref 12–16)
HGB BLD-MCNC: 7.7 G/DL (ref 12–16)
HGB BLD-MCNC: 7.8 G/DL (ref 12–16)
HGB BLD-MCNC: 8 G/DL (ref 12–16)
HGB BLD-MCNC: 8.2 G/DL (ref 12–16)
HGB BLD-MCNC: 8.2 G/DL (ref 12–16)
HGB BLD-MCNC: 8.4 G/DL (ref 12–16)
HGB BLD-MCNC: 8.4 G/DL (ref 12–16)
HGB BLD-MCNC: 8.5 G/DL (ref 12–16)
HGB BLD-MCNC: 8.5 G/DL (ref 12–16)
HGB BLD-MCNC: 8.8 G/DL (ref 12–16)
HGB BLD-MCNC: 8.8 G/DL (ref 12–16)
HGB BLD-MCNC: 9.3 G/DL (ref 12–16)
HGB BLD-MCNC: 9.4 G/DL (ref 12–16)
HGB BLD-MCNC: 9.5 G/DL (ref 12–16)
HGB BLD-MCNC: 9.9 G/DL (ref 12–16)
HOWELL-JOLLY BOD BLD QL SMEAR: ABNORMAL
HOWELL-JOLLY BOD BLD QL SMEAR: ABNORMAL
HYPOCHROMIA BLD QL SMEAR: ABNORMAL
IMM GRANULOCYTES # BLD AUTO: 0.04 K/UL (ref 0–0.04)
IMM GRANULOCYTES # BLD AUTO: 0.05 K/UL (ref 0–0.04)
IMM GRANULOCYTES # BLD AUTO: 0.05 K/UL (ref 0–0.04)
IMM GRANULOCYTES # BLD AUTO: 0.06 K/UL (ref 0–0.04)
IMM GRANULOCYTES # BLD AUTO: 0.12 K/UL (ref 0–0.04)
IMM GRANULOCYTES # BLD AUTO: 0.12 K/UL (ref 0–0.04)
IMM GRANULOCYTES # BLD AUTO: 0.16 K/UL (ref 0–0.04)
IMM GRANULOCYTES # BLD AUTO: 0.17 K/UL (ref 0–0.04)
IMM GRANULOCYTES # BLD AUTO: 0.18 K/UL (ref 0–0.04)
IMM GRANULOCYTES # BLD AUTO: 0.18 K/UL (ref 0–0.04)
IMM GRANULOCYTES # BLD AUTO: 0.21 K/UL (ref 0–0.04)
IMM GRANULOCYTES # BLD AUTO: 0.21 K/UL (ref 0–0.04)
IMM GRANULOCYTES # BLD AUTO: 0.24 K/UL (ref 0–0.04)
IMM GRANULOCYTES # BLD AUTO: 0.26 K/UL (ref 0–0.04)
IMM GRANULOCYTES # BLD AUTO: 0.28 K/UL (ref 0–0.04)
IMM GRANULOCYTES # BLD AUTO: 0.31 K/UL (ref 0–0.04)
IMM GRANULOCYTES # BLD AUTO: 0.31 K/UL (ref 0–0.04)
IMM GRANULOCYTES # BLD AUTO: 0.32 K/UL (ref 0–0.04)
IMM GRANULOCYTES # BLD AUTO: 0.34 K/UL (ref 0–0.04)
IMM GRANULOCYTES # BLD AUTO: 0.41 K/UL (ref 0–0.04)
IMM GRANULOCYTES # BLD AUTO: 0.45 K/UL (ref 0–0.04)
IMM GRANULOCYTES # BLD AUTO: 0.46 K/UL (ref 0–0.04)
IMM GRANULOCYTES # BLD AUTO: 0.47 K/UL (ref 0–0.04)
IMM GRANULOCYTES # BLD AUTO: 0.47 K/UL (ref 0–0.04)
IMM GRANULOCYTES # BLD AUTO: 0.51 K/UL (ref 0–0.04)
IMM GRANULOCYTES # BLD AUTO: 0.51 K/UL (ref 0–0.04)
IMM GRANULOCYTES # BLD AUTO: 0.53 K/UL (ref 0–0.04)
IMM GRANULOCYTES # BLD AUTO: 0.55 K/UL (ref 0–0.04)
IMM GRANULOCYTES # BLD AUTO: 0.68 K/UL (ref 0–0.04)
IMM GRANULOCYTES # BLD AUTO: 0.73 K/UL (ref 0–0.04)
IMM GRANULOCYTES # BLD AUTO: ABNORMAL K/UL (ref 0–0.04)
IMM GRANULOCYTES NFR BLD AUTO: 0.3 % (ref 0–0.5)
IMM GRANULOCYTES NFR BLD AUTO: 0.4 % (ref 0–0.5)
IMM GRANULOCYTES NFR BLD AUTO: 0.5 % (ref 0–0.5)
IMM GRANULOCYTES NFR BLD AUTO: 0.7 % (ref 0–0.5)
IMM GRANULOCYTES NFR BLD AUTO: 0.7 % (ref 0–0.5)
IMM GRANULOCYTES NFR BLD AUTO: 0.9 % (ref 0–0.5)
IMM GRANULOCYTES NFR BLD AUTO: 1 % (ref 0–0.5)
IMM GRANULOCYTES NFR BLD AUTO: 1 % (ref 0–0.5)
IMM GRANULOCYTES NFR BLD AUTO: 1.1 % (ref 0–0.5)
IMM GRANULOCYTES NFR BLD AUTO: 1.3 % (ref 0–0.5)
IMM GRANULOCYTES NFR BLD AUTO: 1.3 % (ref 0–0.5)
IMM GRANULOCYTES NFR BLD AUTO: 1.4 % (ref 0–0.5)
IMM GRANULOCYTES NFR BLD AUTO: 1.5 % (ref 0–0.5)
IMM GRANULOCYTES NFR BLD AUTO: 1.6 % (ref 0–0.5)
IMM GRANULOCYTES NFR BLD AUTO: 1.6 % (ref 0–0.5)
IMM GRANULOCYTES NFR BLD AUTO: 1.7 % (ref 0–0.5)
IMM GRANULOCYTES NFR BLD AUTO: 1.8 % (ref 0–0.5)
IMM GRANULOCYTES NFR BLD AUTO: 1.9 % (ref 0–0.5)
IMM GRANULOCYTES NFR BLD AUTO: 2 % (ref 0–0.5)
IMM GRANULOCYTES NFR BLD AUTO: 2 % (ref 0–0.5)
IMM GRANULOCYTES NFR BLD AUTO: 2.1 % (ref 0–0.5)
IMM GRANULOCYTES NFR BLD AUTO: 2.2 % (ref 0–0.5)
IMM GRANULOCYTES NFR BLD AUTO: 2.3 % (ref 0–0.5)
IMM GRANULOCYTES NFR BLD AUTO: 2.7 % (ref 0–0.5)
IMM GRANULOCYTES NFR BLD AUTO: 2.7 % (ref 0–0.5)
IMM GRANULOCYTES NFR BLD AUTO: 2.8 % (ref 0–0.5)
IMM GRANULOCYTES NFR BLD AUTO: 3.4 % (ref 0–0.5)
IMM GRANULOCYTES NFR BLD AUTO: ABNORMAL % (ref 0–0.5)
INR PPP: 1 (ref 0.8–1.2)
INR PPP: 1.1 (ref 0.8–1.2)
INR PPP: 1.3 (ref 0.8–1.2)
INR PPP: 1.3 (ref 0.8–1.2)
INR PPP: 2.1 (ref 0.8–1.2)
INR PPP: 2.4 (ref 0.8–1.2)
INTERVENTRICULAR SEPTUM: 0.85 CM (ref 0.6–1.1)
INTERVENTRICULAR SEPTUM: 0.92 CM (ref 0.6–1.1)
IP: 12
IP: 12
IRON SERPL-MCNC: 17 UG/DL (ref 30–160)
IRON SERPL-MCNC: 20 UG/DL (ref 30–160)
IVRT: 114.18 MSEC
LA MAJOR: 6.41 CM
LA MAJOR: 6.52 CM
LA MINOR: 5.85 CM
LA MINOR: 6.53 CM
LA WIDTH: 4.69 CM
LA WIDTH: 4.78 CM
LACTATE SERPL-SCNC: 3.4 MMOL/L (ref 0.5–2.2)
LDH SERPL L TO P-CCNC: 16.57 MMOL/L (ref 0.5–2.2)
LDH SERPL L TO P-CCNC: 321 U/L (ref 110–260)
LEFT ATRIUM SIZE: 4.9 CM
LEFT ATRIUM SIZE: 4.96 CM
LEFT ATRIUM VOLUME INDEX MOD: 29.6 ML/M2
LEFT ATRIUM VOLUME INDEX MOD: 36.6 ML/M2
LEFT ATRIUM VOLUME INDEX: 51.7 ML/M2
LEFT ATRIUM VOLUME INDEX: 53.9 ML/M2
LEFT ATRIUM VOLUME MOD: 71.24 CM3
LEFT ATRIUM VOLUME MOD: 85.61 CM3
LEFT ATRIUM VOLUME: 120.96 CM3
LEFT ATRIUM VOLUME: 129.9 CM3
LEFT INTERNAL DIMENSION IN SYSTOLE: 5.06 CM (ref 2.1–4)
LEFT INTERNAL DIMENSION IN SYSTOLE: 5.11 CM (ref 2.1–4)
LEFT VENTRICLE DIASTOLIC VOLUME INDEX: 70.49 ML/M2
LEFT VENTRICLE DIASTOLIC VOLUME INDEX: 80.2 ML/M2
LEFT VENTRICLE DIASTOLIC VOLUME: 169.89 ML
LEFT VENTRICLE DIASTOLIC VOLUME: 187.67 ML
LEFT VENTRICLE MASS INDEX: 89 G/M2
LEFT VENTRICLE MASS INDEX: 94 G/M2
LEFT VENTRICLE SYSTOLIC VOLUME INDEX: 51.6 ML/M2
LEFT VENTRICLE SYSTOLIC VOLUME INDEX: 51.9 ML/M2
LEFT VENTRICLE SYSTOLIC VOLUME: 121.52 ML
LEFT VENTRICLE SYSTOLIC VOLUME: 124.41 ML
LEFT VENTRICULAR INTERNAL DIMENSION IN DIASTOLE: 5.85 CM (ref 3.5–6)
LEFT VENTRICULAR INTERNAL DIMENSION IN DIASTOLE: 6.11 CM (ref 3.5–6)
LEFT VENTRICULAR MASS: 207.17 G
LEFT VENTRICULAR MASS: 225.83 G
LV LATERAL E/E' RATIO: 14.88 M/S
LV LATERAL E/E' RATIO: 20 M/S
LV SEPTAL E/E' RATIO: 14.29 M/S
LV SEPTAL E/E' RATIO: 23.8 M/S
LYMPHOCYTES # BLD AUTO: 0.8 K/UL (ref 1–4.8)
LYMPHOCYTES # BLD AUTO: 0.9 K/UL (ref 1–4.8)
LYMPHOCYTES # BLD AUTO: 0.9 K/UL (ref 1–4.8)
LYMPHOCYTES # BLD AUTO: 1 K/UL (ref 1–4.8)
LYMPHOCYTES # BLD AUTO: 1.2 K/UL (ref 1–4.8)
LYMPHOCYTES # BLD AUTO: 1.3 K/UL (ref 1–4.8)
LYMPHOCYTES # BLD AUTO: 1.4 K/UL (ref 1–4.8)
LYMPHOCYTES # BLD AUTO: 1.5 K/UL (ref 1–4.8)
LYMPHOCYTES # BLD AUTO: 1.6 K/UL (ref 1–4.8)
LYMPHOCYTES # BLD AUTO: 1.7 K/UL (ref 1–4.8)
LYMPHOCYTES # BLD AUTO: 1.8 K/UL (ref 1–4.8)
LYMPHOCYTES # BLD AUTO: 1.8 K/UL (ref 1–4.8)
LYMPHOCYTES # BLD AUTO: 1.9 K/UL (ref 1–4.8)
LYMPHOCYTES # BLD AUTO: 2 K/UL (ref 1–4.8)
LYMPHOCYTES # BLD AUTO: ABNORMAL K/UL (ref 1–4.8)
LYMPHOCYTES NFR BLD: 1 % (ref 18–48)
LYMPHOCYTES NFR BLD: 10 % (ref 18–48)
LYMPHOCYTES NFR BLD: 10.4 % (ref 18–48)
LYMPHOCYTES NFR BLD: 10.5 % (ref 18–48)
LYMPHOCYTES NFR BLD: 10.9 % (ref 18–48)
LYMPHOCYTES NFR BLD: 10.9 % (ref 18–48)
LYMPHOCYTES NFR BLD: 11.3 % (ref 18–48)
LYMPHOCYTES NFR BLD: 14.4 % (ref 18–48)
LYMPHOCYTES NFR BLD: 14.4 % (ref 18–48)
LYMPHOCYTES NFR BLD: 16.2 % (ref 18–48)
LYMPHOCYTES NFR BLD: 17 % (ref 18–48)
LYMPHOCYTES NFR BLD: 17.8 % (ref 18–48)
LYMPHOCYTES NFR BLD: 18.2 % (ref 18–48)
LYMPHOCYTES NFR BLD: 18.2 % (ref 18–48)
LYMPHOCYTES NFR BLD: 19.2 % (ref 18–48)
LYMPHOCYTES NFR BLD: 19.9 % (ref 18–48)
LYMPHOCYTES NFR BLD: 2 % (ref 18–48)
LYMPHOCYTES NFR BLD: 3.9 % (ref 18–48)
LYMPHOCYTES NFR BLD: 4 % (ref 18–48)
LYMPHOCYTES NFR BLD: 4.4 % (ref 18–48)
LYMPHOCYTES NFR BLD: 5.9 % (ref 18–48)
LYMPHOCYTES NFR BLD: 5.9 % (ref 18–48)
LYMPHOCYTES NFR BLD: 6 % (ref 18–48)
LYMPHOCYTES NFR BLD: 6 % (ref 18–48)
LYMPHOCYTES NFR BLD: 6.3 % (ref 18–48)
LYMPHOCYTES NFR BLD: 6.4 % (ref 18–48)
LYMPHOCYTES NFR BLD: 6.5 % (ref 18–48)
LYMPHOCYTES NFR BLD: 6.6 % (ref 18–48)
LYMPHOCYTES NFR BLD: 6.6 % (ref 18–48)
LYMPHOCYTES NFR BLD: 6.8 % (ref 18–48)
LYMPHOCYTES NFR BLD: 6.9 % (ref 18–48)
LYMPHOCYTES NFR BLD: 7 % (ref 18–48)
LYMPHOCYTES NFR BLD: 7 % (ref 18–48)
LYMPHOCYTES NFR BLD: 7.2 % (ref 18–48)
LYMPHOCYTES NFR BLD: 7.5 % (ref 18–48)
LYMPHOCYTES NFR BLD: 7.6 % (ref 18–48)
LYMPHOCYTES NFR BLD: 7.7 % (ref 18–48)
LYMPHOCYTES NFR BLD: 7.7 % (ref 18–48)
LYMPHOCYTES NFR BLD: 7.8 % (ref 18–48)
LYMPHOCYTES NFR BLD: 8 % (ref 18–48)
LYMPHOCYTES NFR BLD: 8 % (ref 18–48)
LYMPHOCYTES NFR BLD: 8.1 % (ref 18–48)
LYMPHOCYTES NFR BLD: 8.3 % (ref 18–48)
LYMPHOCYTES NFR FLD MANUAL: 33 %
LYMPHOCYTES NFR FLD MANUAL: 5 %
MAGNESIUM SERPL-MCNC: 1.8 MG/DL (ref 1.6–2.6)
MAGNESIUM SERPL-MCNC: 1.9 MG/DL (ref 1.6–2.6)
MAGNESIUM SERPL-MCNC: 2 MG/DL (ref 1.6–2.6)
MAGNESIUM SERPL-MCNC: 2.1 MG/DL (ref 1.6–2.6)
MAGNESIUM SERPL-MCNC: 2.2 MG/DL (ref 1.6–2.6)
MAGNESIUM SERPL-MCNC: 2.3 MG/DL (ref 1.6–2.6)
MAGNESIUM SERPL-MCNC: 2.4 MG/DL (ref 1.6–2.6)
MAGNESIUM SERPL-MCNC: 2.5 MG/DL (ref 1.6–2.6)
MCH RBC QN AUTO: 25.1 PG (ref 27–31)
MCH RBC QN AUTO: 25.3 PG (ref 27–31)
MCH RBC QN AUTO: 25.3 PG (ref 27–31)
MCH RBC QN AUTO: 25.5 PG (ref 27–31)
MCH RBC QN AUTO: 25.6 PG (ref 27–31)
MCH RBC QN AUTO: 25.7 PG (ref 27–31)
MCH RBC QN AUTO: 25.8 PG (ref 27–31)
MCH RBC QN AUTO: 25.9 PG (ref 27–31)
MCH RBC QN AUTO: 26 PG (ref 27–31)
MCH RBC QN AUTO: 26.1 PG (ref 27–31)
MCH RBC QN AUTO: 26.2 PG (ref 27–31)
MCH RBC QN AUTO: 26.4 PG (ref 27–31)
MCH RBC QN AUTO: 26.5 PG (ref 27–31)
MCH RBC QN AUTO: 26.6 PG (ref 27–31)
MCH RBC QN AUTO: 26.7 PG (ref 27–31)
MCH RBC QN AUTO: 26.8 PG (ref 27–31)
MCH RBC QN AUTO: 26.9 PG (ref 27–31)
MCH RBC QN AUTO: 27 PG (ref 27–31)
MCH RBC QN AUTO: 27.6 PG (ref 27–31)
MCHC RBC AUTO-ENTMCNC: 27.2 G/DL (ref 32–36)
MCHC RBC AUTO-ENTMCNC: 29.2 G/DL (ref 32–36)
MCHC RBC AUTO-ENTMCNC: 29.3 G/DL (ref 32–36)
MCHC RBC AUTO-ENTMCNC: 29.4 G/DL (ref 32–36)
MCHC RBC AUTO-ENTMCNC: 29.8 G/DL (ref 32–36)
MCHC RBC AUTO-ENTMCNC: 29.9 G/DL (ref 32–36)
MCHC RBC AUTO-ENTMCNC: 30 G/DL (ref 32–36)
MCHC RBC AUTO-ENTMCNC: 30 G/DL (ref 32–36)
MCHC RBC AUTO-ENTMCNC: 30.1 G/DL (ref 32–36)
MCHC RBC AUTO-ENTMCNC: 30.1 G/DL (ref 32–36)
MCHC RBC AUTO-ENTMCNC: 30.2 G/DL (ref 32–36)
MCHC RBC AUTO-ENTMCNC: 30.2 G/DL (ref 32–36)
MCHC RBC AUTO-ENTMCNC: 30.3 G/DL (ref 32–36)
MCHC RBC AUTO-ENTMCNC: 30.4 G/DL (ref 32–36)
MCHC RBC AUTO-ENTMCNC: 30.4 G/DL (ref 32–36)
MCHC RBC AUTO-ENTMCNC: 30.6 G/DL (ref 32–36)
MCHC RBC AUTO-ENTMCNC: 30.7 G/DL (ref 32–36)
MCHC RBC AUTO-ENTMCNC: 30.8 G/DL (ref 32–36)
MCHC RBC AUTO-ENTMCNC: 30.9 G/DL (ref 32–36)
MCHC RBC AUTO-ENTMCNC: 31 G/DL (ref 32–36)
MCHC RBC AUTO-ENTMCNC: 31 G/DL (ref 32–36)
MCHC RBC AUTO-ENTMCNC: 31.1 G/DL (ref 32–36)
MCHC RBC AUTO-ENTMCNC: 31.3 G/DL (ref 32–36)
MCHC RBC AUTO-ENTMCNC: 31.4 G/DL (ref 32–36)
MCHC RBC AUTO-ENTMCNC: 31.6 G/DL (ref 32–36)
MCHC RBC AUTO-ENTMCNC: 31.8 G/DL (ref 32–36)
MCHC RBC AUTO-ENTMCNC: 32 G/DL (ref 32–36)
MCHC RBC AUTO-ENTMCNC: 32.1 G/DL (ref 32–36)
MCHC RBC AUTO-ENTMCNC: 32.2 G/DL (ref 32–36)
MCHC RBC AUTO-ENTMCNC: 32.5 G/DL (ref 32–36)
MCHC RBC AUTO-ENTMCNC: 33.2 G/DL (ref 32–36)
MCHC RBC AUTO-ENTMCNC: 33.5 G/DL (ref 32–36)
MCHC RBC AUTO-ENTMCNC: 34.1 G/DL (ref 32–36)
MCV RBC AUTO: 77 FL (ref 82–98)
MCV RBC AUTO: 77 FL (ref 82–98)
MCV RBC AUTO: 78 FL (ref 82–98)
MCV RBC AUTO: 80 FL (ref 82–98)
MCV RBC AUTO: 81 FL (ref 82–98)
MCV RBC AUTO: 82 FL (ref 82–98)
MCV RBC AUTO: 83 FL (ref 82–98)
MCV RBC AUTO: 84 FL (ref 82–98)
MCV RBC AUTO: 85 FL (ref 82–98)
MCV RBC AUTO: 86 FL (ref 82–98)
MCV RBC AUTO: 87 FL (ref 82–98)
MCV RBC AUTO: 88 FL (ref 82–98)
MCV RBC AUTO: 89 FL (ref 82–98)
MCV RBC AUTO: 90 FL (ref 82–98)
MCV RBC AUTO: 91 FL (ref 82–98)
MCV RBC AUTO: 95 FL (ref 82–98)
METAMYELOCYTES NFR BLD MANUAL: 1 %
METAMYELOCYTES NFR BLD MANUAL: 1 %
METAMYELOCYTES NFR BLD MANUAL: 2 %
METAMYELOCYTES NFR BLD MANUAL: 2 %
MIN VOL: 10
MIN VOL: 15.5
MODE: ABNORMAL
MONOCYTES # BLD AUTO: 0.5 K/UL (ref 0.3–1)
MONOCYTES # BLD AUTO: 0.5 K/UL (ref 0.3–1)
MONOCYTES # BLD AUTO: 0.6 K/UL (ref 0.3–1)
MONOCYTES # BLD AUTO: 0.7 K/UL (ref 0.3–1)
MONOCYTES # BLD AUTO: 0.7 K/UL (ref 0.3–1)
MONOCYTES # BLD AUTO: 0.8 K/UL (ref 0.3–1)
MONOCYTES # BLD AUTO: 0.8 K/UL (ref 0.3–1)
MONOCYTES # BLD AUTO: 0.9 K/UL (ref 0.3–1)
MONOCYTES # BLD AUTO: 0.9 K/UL (ref 0.3–1)
MONOCYTES # BLD AUTO: 1 K/UL (ref 0.3–1)
MONOCYTES # BLD AUTO: 1.1 K/UL (ref 0.3–1)
MONOCYTES # BLD AUTO: 1.2 K/UL (ref 0.3–1)
MONOCYTES # BLD AUTO: 1.3 K/UL (ref 0.3–1)
MONOCYTES # BLD AUTO: 1.5 K/UL (ref 0.3–1)
MONOCYTES # BLD AUTO: 1.6 K/UL (ref 0.3–1)
MONOCYTES # BLD AUTO: 1.7 K/UL (ref 0.3–1)
MONOCYTES # BLD AUTO: 1.8 K/UL (ref 0.3–1)
MONOCYTES # BLD AUTO: 1.9 K/UL (ref 0.3–1)
MONOCYTES # BLD AUTO: 2.1 K/UL (ref 0.3–1)
MONOCYTES # BLD AUTO: 2.5 K/UL (ref 0.3–1)
MONOCYTES # BLD AUTO: 2.5 K/UL (ref 0.3–1)
MONOCYTES # BLD AUTO: ABNORMAL K/UL (ref 0.3–1)
MONOCYTES NFR BLD: 0 % (ref 4–15)
MONOCYTES NFR BLD: 1 % (ref 4–15)
MONOCYTES NFR BLD: 1.7 % (ref 4–15)
MONOCYTES NFR BLD: 10.2 % (ref 4–15)
MONOCYTES NFR BLD: 10.5 % (ref 4–15)
MONOCYTES NFR BLD: 10.6 % (ref 4–15)
MONOCYTES NFR BLD: 10.6 % (ref 4–15)
MONOCYTES NFR BLD: 10.9 % (ref 4–15)
MONOCYTES NFR BLD: 12.8 % (ref 4–15)
MONOCYTES NFR BLD: 13 % (ref 4–15)
MONOCYTES NFR BLD: 13.3 % (ref 4–15)
MONOCYTES NFR BLD: 13.9 % (ref 4–15)
MONOCYTES NFR BLD: 15.5 % (ref 4–15)
MONOCYTES NFR BLD: 2 % (ref 4–15)
MONOCYTES NFR BLD: 2.5 % (ref 4–15)
MONOCYTES NFR BLD: 3 % (ref 4–15)
MONOCYTES NFR BLD: 3.5 % (ref 4–15)
MONOCYTES NFR BLD: 3.7 % (ref 4–15)
MONOCYTES NFR BLD: 4 % (ref 4–15)
MONOCYTES NFR BLD: 4.1 % (ref 4–15)
MONOCYTES NFR BLD: 4.4 % (ref 4–15)
MONOCYTES NFR BLD: 4.9 % (ref 4–15)
MONOCYTES NFR BLD: 4.9 % (ref 4–15)
MONOCYTES NFR BLD: 5.1 % (ref 4–15)
MONOCYTES NFR BLD: 5.1 % (ref 4–15)
MONOCYTES NFR BLD: 5.2 % (ref 4–15)
MONOCYTES NFR BLD: 5.2 % (ref 4–15)
MONOCYTES NFR BLD: 5.3 % (ref 4–15)
MONOCYTES NFR BLD: 5.9 % (ref 4–15)
MONOCYTES NFR BLD: 5.9 % (ref 4–15)
MONOCYTES NFR BLD: 6 % (ref 4–15)
MONOCYTES NFR BLD: 6.7 % (ref 4–15)
MONOCYTES NFR BLD: 7.1 % (ref 4–15)
MONOCYTES NFR BLD: 7.8 % (ref 4–15)
MONOCYTES NFR BLD: 8 % (ref 4–15)
MONOCYTES NFR BLD: 8.3 % (ref 4–15)
MONOCYTES NFR BLD: 8.4 % (ref 4–15)
MONOCYTES NFR BLD: 8.6 % (ref 4–15)
MONOCYTES NFR BLD: 8.8 % (ref 4–15)
MONOCYTES NFR BLD: 9.3 % (ref 4–15)
MONOS+MACROS NFR FLD MANUAL: 42 %
MONOS+MACROS NFR FLD MANUAL: 57 %
MV PEAK E VEL: 1 M/S
MV PEAK E VEL: 1.19 M/S
MV STENOSIS PRESSURE HALF TIME: 53.56 MS
MV VALVE AREA P 1/2 METHOD: 4.11 CM2
MYELOCYTES NFR BLD MANUAL: 1 %
NEUTROPHILS # BLD AUTO: 10 K/UL (ref 1.8–7.7)
NEUTROPHILS # BLD AUTO: 11 K/UL (ref 1.8–7.7)
NEUTROPHILS # BLD AUTO: 11.3 K/UL (ref 1.8–7.7)
NEUTROPHILS # BLD AUTO: 11.7 K/UL (ref 1.8–7.7)
NEUTROPHILS # BLD AUTO: 12.2 K/UL (ref 1.8–7.7)
NEUTROPHILS # BLD AUTO: 12.2 K/UL (ref 1.8–7.7)
NEUTROPHILS # BLD AUTO: 12.4 K/UL (ref 1.8–7.7)
NEUTROPHILS # BLD AUTO: 12.7 K/UL (ref 1.8–7.7)
NEUTROPHILS # BLD AUTO: 13.5 K/UL (ref 1.8–7.7)
NEUTROPHILS # BLD AUTO: 14.3 K/UL (ref 1.8–7.7)
NEUTROPHILS # BLD AUTO: 14.3 K/UL (ref 1.8–7.7)
NEUTROPHILS # BLD AUTO: 15.1 K/UL (ref 1.8–7.7)
NEUTROPHILS # BLD AUTO: 15.2 K/UL (ref 1.8–7.7)
NEUTROPHILS # BLD AUTO: 15.5 K/UL (ref 1.8–7.7)
NEUTROPHILS # BLD AUTO: 15.5 K/UL (ref 1.8–7.7)
NEUTROPHILS # BLD AUTO: 16.1 K/UL (ref 1.8–7.7)
NEUTROPHILS # BLD AUTO: 17.1 K/UL (ref 1.8–7.7)
NEUTROPHILS # BLD AUTO: 17.8 K/UL (ref 1.8–7.7)
NEUTROPHILS # BLD AUTO: 18.3 K/UL (ref 1.8–7.7)
NEUTROPHILS # BLD AUTO: 18.6 K/UL (ref 1.8–7.7)
NEUTROPHILS # BLD AUTO: 18.7 K/UL (ref 1.8–7.7)
NEUTROPHILS # BLD AUTO: 19 K/UL (ref 1.8–7.7)
NEUTROPHILS # BLD AUTO: 19.9 K/UL (ref 1.8–7.7)
NEUTROPHILS # BLD AUTO: 20.3 K/UL (ref 1.8–7.7)
NEUTROPHILS # BLD AUTO: 20.4 K/UL (ref 1.8–7.7)
NEUTROPHILS # BLD AUTO: 21.7 K/UL (ref 1.8–7.7)
NEUTROPHILS # BLD AUTO: 22.3 K/UL (ref 1.8–7.7)
NEUTROPHILS # BLD AUTO: 23.6 K/UL (ref 1.8–7.7)
NEUTROPHILS # BLD AUTO: 25.6 K/UL (ref 1.8–7.7)
NEUTROPHILS # BLD AUTO: 5.8 K/UL (ref 1.8–7.7)
NEUTROPHILS # BLD AUTO: 6.2 K/UL (ref 1.8–7.7)
NEUTROPHILS # BLD AUTO: 6.4 K/UL (ref 1.8–7.7)
NEUTROPHILS # BLD AUTO: 7 K/UL (ref 1.8–7.7)
NEUTROPHILS # BLD AUTO: 7 K/UL (ref 1.8–7.7)
NEUTROPHILS # BLD AUTO: 7.2 K/UL (ref 1.8–7.7)
NEUTROPHILS # BLD AUTO: 7.5 K/UL (ref 1.8–7.7)
NEUTROPHILS # BLD AUTO: 7.5 K/UL (ref 1.8–7.7)
NEUTROPHILS # BLD AUTO: 8.8 K/UL (ref 1.8–7.7)
NEUTROPHILS # BLD AUTO: 8.9 K/UL (ref 1.8–7.7)
NEUTROPHILS # BLD AUTO: 9.5 K/UL (ref 1.8–7.7)
NEUTROPHILS NFR BLD: 61.9 % (ref 38–73)
NEUTROPHILS NFR BLD: 64.3 % (ref 38–73)
NEUTROPHILS NFR BLD: 68.3 % (ref 38–73)
NEUTROPHILS NFR BLD: 68.3 % (ref 38–73)
NEUTROPHILS NFR BLD: 70.4 % (ref 38–73)
NEUTROPHILS NFR BLD: 71 % (ref 38–73)
NEUTROPHILS NFR BLD: 73.1 % (ref 38–73)
NEUTROPHILS NFR BLD: 73.1 % (ref 38–73)
NEUTROPHILS NFR BLD: 73.3 % (ref 38–73)
NEUTROPHILS NFR BLD: 76.2 % (ref 38–73)
NEUTROPHILS NFR BLD: 77.2 % (ref 38–73)
NEUTROPHILS NFR BLD: 78.6 % (ref 38–73)
NEUTROPHILS NFR BLD: 78.6 % (ref 38–73)
NEUTROPHILS NFR BLD: 78.9 % (ref 38–73)
NEUTROPHILS NFR BLD: 78.9 % (ref 38–73)
NEUTROPHILS NFR BLD: 79 % (ref 38–73)
NEUTROPHILS NFR BLD: 79.1 % (ref 38–73)
NEUTROPHILS NFR BLD: 79.9 % (ref 38–73)
NEUTROPHILS NFR BLD: 80.7 % (ref 38–73)
NEUTROPHILS NFR BLD: 81 % (ref 38–73)
NEUTROPHILS NFR BLD: 81.8 % (ref 38–73)
NEUTROPHILS NFR BLD: 82.4 % (ref 38–73)
NEUTROPHILS NFR BLD: 82.4 % (ref 38–73)
NEUTROPHILS NFR BLD: 82.6 % (ref 38–73)
NEUTROPHILS NFR BLD: 82.8 % (ref 38–73)
NEUTROPHILS NFR BLD: 82.8 % (ref 38–73)
NEUTROPHILS NFR BLD: 83.3 % (ref 38–73)
NEUTROPHILS NFR BLD: 83.4 % (ref 38–73)
NEUTROPHILS NFR BLD: 84 % (ref 38–73)
NEUTROPHILS NFR BLD: 84 % (ref 38–73)
NEUTROPHILS NFR BLD: 84.1 % (ref 38–73)
NEUTROPHILS NFR BLD: 84.2 % (ref 38–73)
NEUTROPHILS NFR BLD: 84.3 % (ref 38–73)
NEUTROPHILS NFR BLD: 84.7 % (ref 38–73)
NEUTROPHILS NFR BLD: 85.6 % (ref 38–73)
NEUTROPHILS NFR BLD: 85.9 % (ref 38–73)
NEUTROPHILS NFR BLD: 86 % (ref 38–73)
NEUTROPHILS NFR BLD: 86.1 % (ref 38–73)
NEUTROPHILS NFR BLD: 86.9 % (ref 38–73)
NEUTROPHILS NFR BLD: 87.6 % (ref 38–73)
NEUTROPHILS NFR BLD: 88 % (ref 38–73)
NEUTROPHILS NFR BLD: 88.6 % (ref 38–73)
NEUTROPHILS NFR BLD: 89.2 % (ref 38–73)
NEUTROPHILS NFR BLD: 90 % (ref 38–73)
NEUTROPHILS NFR BLD: 90 % (ref 38–73)
NEUTROPHILS NFR BLD: 91 % (ref 38–73)
NEUTROPHILS NFR BLD: 96 % (ref 38–73)
NEUTROPHILS NFR FLD MANUAL: 25 %
NEUTROPHILS NFR FLD MANUAL: 38 %
NEUTS BAND NFR BLD MANUAL: 1 %
NEUTS BAND NFR BLD MANUAL: 2 %
NEUTS BAND NFR BLD MANUAL: 2 %
NEUTS BAND NFR BLD MANUAL: 3 %
NEUTS BAND NFR BLD MANUAL: 5 %
NEUTS BAND NFR BLD MANUAL: 6 %
NRBC BLD-RTO: 0 /100 WBC
NRBC BLD-RTO: 1 /100 WBC
NRBC BLD-RTO: 10 /100 WBC
NRBC BLD-RTO: 11 /100 WBC
NRBC BLD-RTO: 13 /100 WBC
NRBC BLD-RTO: 13 /100 WBC
NRBC BLD-RTO: 2 /100 WBC
NRBC BLD-RTO: 8 /100 WBC
NRBC BLD-RTO: 9 /100 WBC
NRBC BLD-RTO: 9 /100 WBC
NUM UNITS TRANS PACKED RBC: NORMAL
O+P STL MICRO: NORMAL
OHS QRS DURATION: 148 MS
OHS QRS DURATION: 148 MS
OHS QRS DURATION: 150 MS
OHS QRS DURATION: 150 MS
OHS QRS DURATION: 158 MS
OHS QTC CALCULATION: 435 MS
OHS QTC CALCULATION: 457 MS
OHS QTC CALCULATION: 512 MS
OHS QTC CALCULATION: 515 MS
OHS QTC CALCULATION: 524 MS
OVALOCYTES BLD QL SMEAR: ABNORMAL
PCO2 BLDA: 22.8 MMHG (ref 35–45)
PCO2 BLDA: 33.4 MMHG (ref 35–45)
PCO2 BLDA: 36.2 MMHG (ref 35–45)
PCO2 BLDA: 38.3 MMHG (ref 35–45)
PCO2 BLDA: 40.5 MMHG (ref 35–45)
PCO2 BLDA: 43 MMHG (ref 35–45)
PCO2 BLDA: 47 MMHG (ref 35–45)
PF4 HEPARIN CMPLX AB SER QL: 0.32 OD (ref 0–0.4)
PF4 HEPARIN CMPLX AB SER QL: 0.36 OD (ref 0–0.4)
PH SMN: 7 [PH] (ref 7.35–7.45)
PH SMN: 7.04 [PH] (ref 7.35–7.45)
PH SMN: 7.08 [PH] (ref 7.35–7.45)
PH SMN: 7.22 [PH] (ref 7.35–7.45)
PH SMN: 7.28 [PH] (ref 7.35–7.45)
PH SMN: 7.29 [PH] (ref 7.35–7.45)
PH SMN: 7.31 [PH] (ref 7.35–7.45)
PHOSPHATE SERPL-MCNC: 1.7 MG/DL (ref 2.7–4.5)
PHOSPHATE SERPL-MCNC: 2.1 MG/DL (ref 2.7–4.5)
PHOSPHATE SERPL-MCNC: 2.3 MG/DL (ref 2.7–4.5)
PHOSPHATE SERPL-MCNC: 2.3 MG/DL (ref 2.7–4.5)
PHOSPHATE SERPL-MCNC: 2.4 MG/DL (ref 2.7–4.5)
PHOSPHATE SERPL-MCNC: 2.4 MG/DL (ref 2.7–4.5)
PHOSPHATE SERPL-MCNC: 2.5 MG/DL (ref 2.7–4.5)
PHOSPHATE SERPL-MCNC: 2.7 MG/DL (ref 2.7–4.5)
PHOSPHATE SERPL-MCNC: 2.9 MG/DL (ref 2.7–4.5)
PHOSPHATE SERPL-MCNC: 3.1 MG/DL (ref 2.7–4.5)
PHOSPHATE SERPL-MCNC: 3.2 MG/DL (ref 2.7–4.5)
PHOSPHATE SERPL-MCNC: 3.2 MG/DL (ref 2.7–4.5)
PHOSPHATE SERPL-MCNC: 3.3 MG/DL (ref 2.7–4.5)
PHOSPHATE SERPL-MCNC: 3.3 MG/DL (ref 2.7–4.5)
PHOSPHATE SERPL-MCNC: 3.4 MG/DL (ref 2.7–4.5)
PHOSPHATE SERPL-MCNC: 3.4 MG/DL (ref 2.7–4.5)
PHOSPHATE SERPL-MCNC: 3.5 MG/DL (ref 2.7–4.5)
PHOSPHATE SERPL-MCNC: 3.5 MG/DL (ref 2.7–4.5)
PHOSPHATE SERPL-MCNC: 3.6 MG/DL (ref 2.7–4.5)
PHOSPHATE SERPL-MCNC: 3.8 MG/DL (ref 2.7–4.5)
PHOSPHATE SERPL-MCNC: 3.8 MG/DL (ref 2.7–4.5)
PHOSPHATE SERPL-MCNC: 3.9 MG/DL (ref 2.7–4.5)
PHOSPHATE SERPL-MCNC: 4 MG/DL (ref 2.7–4.5)
PHOSPHATE SERPL-MCNC: 4 MG/DL (ref 2.7–4.5)
PHOSPHATE SERPL-MCNC: 4.2 MG/DL (ref 2.7–4.5)
PHOSPHATE SERPL-MCNC: 4.4 MG/DL (ref 2.7–4.5)
PHOSPHATE SERPL-MCNC: 4.4 MG/DL (ref 2.7–4.5)
PHOSPHATE SERPL-MCNC: 4.6 MG/DL (ref 2.7–4.5)
PHOSPHATE SERPL-MCNC: 4.6 MG/DL (ref 2.7–4.5)
PHOSPHATE SERPL-MCNC: 4.7 MG/DL (ref 2.7–4.5)
PHOSPHATE SERPL-MCNC: 4.8 MG/DL (ref 2.7–4.5)
PHOSPHATE SERPL-MCNC: 4.8 MG/DL (ref 2.7–4.5)
PHOSPHATE SERPL-MCNC: 5 MG/DL (ref 2.7–4.5)
PHOSPHATE SERPL-MCNC: 5.1 MG/DL (ref 2.7–4.5)
PHOSPHATE SERPL-MCNC: 5.2 MG/DL (ref 2.7–4.5)
PHOSPHATE SERPL-MCNC: 5.4 MG/DL (ref 2.7–4.5)
PHOSPHATE SERPL-MCNC: 5.4 MG/DL (ref 2.7–4.5)
PHOSPHATE SERPL-MCNC: 5.6 MG/DL (ref 2.7–4.5)
PHOSPHATE SERPL-MCNC: 5.7 MG/DL (ref 2.7–4.5)
PHOSPHATE SERPL-MCNC: 5.8 MG/DL (ref 2.7–4.5)
PHOSPHATE SERPL-MCNC: 5.9 MG/DL (ref 2.7–4.5)
PHOSPHATE SERPL-MCNC: 6.2 MG/DL (ref 2.7–4.5)
PHOSPHATE SERPL-MCNC: 6.4 MG/DL (ref 2.7–4.5)
PHOSPHATE SERPL-MCNC: 6.5 MG/DL (ref 2.7–4.5)
PHOSPHATE SERPL-MCNC: 6.6 MG/DL (ref 2.7–4.5)
PHOSPHATE SERPL-MCNC: 6.6 MG/DL (ref 2.7–4.5)
PHOSPHATE SERPL-MCNC: 7 MG/DL (ref 2.7–4.5)
PHOSPHATE SERPL-MCNC: 7.5 MG/DL (ref 2.7–4.5)
PHOSPHATE SERPL-MCNC: 7.6 MG/DL (ref 2.7–4.5)
PISA MRMAX VEL: 0.04 M/S
PISA TR MAX VEL: 3.39 M/S
PISA TR MAX VEL: 3.61 M/S
PLATELET # BLD AUTO: 104 K/UL (ref 150–450)
PLATELET # BLD AUTO: 105 K/UL (ref 150–450)
PLATELET # BLD AUTO: 124 K/UL (ref 150–450)
PLATELET # BLD AUTO: 125 K/UL (ref 150–450)
PLATELET # BLD AUTO: 140 K/UL (ref 150–450)
PLATELET # BLD AUTO: 145 K/UL (ref 150–450)
PLATELET # BLD AUTO: 150 K/UL (ref 150–450)
PLATELET # BLD AUTO: 151 K/UL (ref 150–450)
PLATELET # BLD AUTO: 156 K/UL (ref 150–450)
PLATELET # BLD AUTO: 158 K/UL (ref 150–450)
PLATELET # BLD AUTO: 162 K/UL (ref 150–450)
PLATELET # BLD AUTO: 169 K/UL (ref 150–450)
PLATELET # BLD AUTO: 177 K/UL (ref 150–450)
PLATELET # BLD AUTO: 199 K/UL (ref 150–450)
PLATELET # BLD AUTO: 204 K/UL (ref 150–450)
PLATELET # BLD AUTO: 207 K/UL (ref 150–450)
PLATELET # BLD AUTO: 208 K/UL (ref 150–450)
PLATELET # BLD AUTO: 210 K/UL (ref 150–450)
PLATELET # BLD AUTO: 215 K/UL (ref 150–450)
PLATELET # BLD AUTO: 239 K/UL (ref 150–450)
PLATELET # BLD AUTO: 239 K/UL (ref 150–450)
PLATELET # BLD AUTO: 253 K/UL (ref 150–450)
PLATELET # BLD AUTO: 253 K/UL (ref 150–450)
PLATELET # BLD AUTO: 254 K/UL (ref 150–450)
PLATELET # BLD AUTO: 260 K/UL (ref 150–450)
PLATELET # BLD AUTO: 271 K/UL (ref 150–450)
PLATELET # BLD AUTO: 274 K/UL (ref 150–450)
PLATELET # BLD AUTO: 277 K/UL (ref 150–450)
PLATELET # BLD AUTO: 281 K/UL (ref 150–450)
PLATELET # BLD AUTO: 284 K/UL (ref 150–450)
PLATELET # BLD AUTO: 284 K/UL (ref 150–450)
PLATELET # BLD AUTO: 289 K/UL (ref 150–450)
PLATELET # BLD AUTO: 294 K/UL (ref 150–450)
PLATELET # BLD AUTO: 295 K/UL (ref 150–450)
PLATELET # BLD AUTO: 314 K/UL (ref 150–450)
PLATELET # BLD AUTO: 337 K/UL (ref 150–450)
PLATELET # BLD AUTO: 343 K/UL (ref 150–450)
PLATELET # BLD AUTO: 344 K/UL (ref 150–450)
PLATELET # BLD AUTO: 344 K/UL (ref 150–450)
PLATELET # BLD AUTO: 347 K/UL (ref 150–450)
PLATELET # BLD AUTO: 39 K/UL (ref 150–450)
PLATELET # BLD AUTO: 40 K/UL (ref 150–450)
PLATELET # BLD AUTO: 56 K/UL (ref 150–450)
PLATELET # BLD AUTO: 83 K/UL (ref 150–450)
PLATELET # BLD AUTO: 96 K/UL (ref 150–450)
PLATELET # BLD AUTO: 96 K/UL (ref 150–450)
PLATELET # BLD AUTO: 97 K/UL (ref 150–450)
PLATELET BLD QL SMEAR: ABNORMAL
PMV BLD AUTO: 10 FL (ref 9.2–12.9)
PMV BLD AUTO: 10.2 FL (ref 9.2–12.9)
PMV BLD AUTO: 10.3 FL (ref 9.2–12.9)
PMV BLD AUTO: 10.4 FL (ref 9.2–12.9)
PMV BLD AUTO: 10.5 FL (ref 9.2–12.9)
PMV BLD AUTO: 10.6 FL (ref 9.2–12.9)
PMV BLD AUTO: 10.6 FL (ref 9.2–12.9)
PMV BLD AUTO: 10.7 FL (ref 9.2–12.9)
PMV BLD AUTO: 10.7 FL (ref 9.2–12.9)
PMV BLD AUTO: 10.9 FL (ref 9.2–12.9)
PMV BLD AUTO: 11 FL (ref 9.2–12.9)
PMV BLD AUTO: 11 FL (ref 9.2–12.9)
PMV BLD AUTO: 11.1 FL (ref 9.2–12.9)
PMV BLD AUTO: 11.2 FL (ref 9.2–12.9)
PMV BLD AUTO: 11.2 FL (ref 9.2–12.9)
PMV BLD AUTO: 11.3 FL (ref 9.2–12.9)
PMV BLD AUTO: 11.3 FL (ref 9.2–12.9)
PMV BLD AUTO: 9.4 FL (ref 9.2–12.9)
PMV BLD AUTO: 9.5 FL (ref 9.2–12.9)
PMV BLD AUTO: 9.6 FL (ref 9.2–12.9)
PMV BLD AUTO: 9.6 FL (ref 9.2–12.9)
PMV BLD AUTO: 9.7 FL (ref 9.2–12.9)
PMV BLD AUTO: 9.7 FL (ref 9.2–12.9)
PMV BLD AUTO: 9.8 FL (ref 9.2–12.9)
PMV BLD AUTO: 9.9 FL (ref 9.2–12.9)
PMV BLD AUTO: 9.9 FL (ref 9.2–12.9)
PMV BLD AUTO: ABNORMAL FL (ref 9.2–12.9)
PO2 BLDA: 121 MMHG (ref 80–100)
PO2 BLDA: 215 MMHG (ref 80–100)
PO2 BLDA: 26 MMHG (ref 40–60)
PO2 BLDA: 34 MMHG (ref 40–60)
PO2 BLDA: 35 MMHG (ref 40–60)
PO2 BLDA: 42 MMHG (ref 40–60)
PO2 BLDA: 92 MMHG (ref 80–100)
POC BE: -14 MMOL/L
POC BE: -21 MMOL/L
POC BE: -21 MMOL/L
POC BE: -23 MMOL/L
POC BE: -5 MMOL/L
POC BE: -7 MMOL/L
POC BE: -7 MMOL/L
POC IONIZED CALCIUM: 1.16 MMOL/L (ref 1.06–1.42)
POC IONIZED CALCIUM: 1.18 MMOL/L (ref 1.06–1.42)
POC SATURATED O2: 25 % (ref 95–100)
POC SATURATED O2: 45 % (ref 95–100)
POC SATURATED O2: 55 % (ref 95–100)
POC SATURATED O2: 71 % (ref 95–100)
POC SATURATED O2: 96 % (ref 95–100)
POC SATURATED O2: 98 % (ref 95–100)
POC SATURATED O2: 99 % (ref 95–100)
POC TCO2: 11 MMOL/L (ref 24–29)
POC TCO2: 12 MMOL/L (ref 24–29)
POC TCO2: 15 MMOL/L (ref 24–29)
POC TCO2: 21 MMOL/L (ref 23–27)
POC TCO2: 21 MMOL/L (ref 23–27)
POC TCO2: 24 MMOL/L (ref 24–29)
POC TCO2: 7 MMOL/L (ref 23–27)
POCT GLUCOSE: 100 MG/DL (ref 70–110)
POCT GLUCOSE: 100 MG/DL (ref 70–110)
POCT GLUCOSE: 102 MG/DL (ref 70–110)
POCT GLUCOSE: 102 MG/DL (ref 70–110)
POCT GLUCOSE: 103 MG/DL (ref 70–110)
POCT GLUCOSE: 104 MG/DL (ref 70–110)
POCT GLUCOSE: 105 MG/DL (ref 70–110)
POCT GLUCOSE: 105 MG/DL (ref 70–110)
POCT GLUCOSE: 106 MG/DL (ref 70–110)
POCT GLUCOSE: 107 MG/DL (ref 70–110)
POCT GLUCOSE: 108 MG/DL (ref 70–110)
POCT GLUCOSE: 109 MG/DL (ref 70–110)
POCT GLUCOSE: 110 MG/DL (ref 70–110)
POCT GLUCOSE: 111 MG/DL (ref 70–110)
POCT GLUCOSE: 111 MG/DL (ref 70–110)
POCT GLUCOSE: 113 MG/DL (ref 70–110)
POCT GLUCOSE: 113 MG/DL (ref 70–110)
POCT GLUCOSE: 115 MG/DL (ref 70–110)
POCT GLUCOSE: 115 MG/DL (ref 70–110)
POCT GLUCOSE: 116 MG/DL (ref 70–110)
POCT GLUCOSE: 116 MG/DL (ref 70–110)
POCT GLUCOSE: 117 MG/DL (ref 70–110)
POCT GLUCOSE: 118 MG/DL (ref 70–110)
POCT GLUCOSE: 119 MG/DL (ref 70–110)
POCT GLUCOSE: 119 MG/DL (ref 70–110)
POCT GLUCOSE: 120 MG/DL (ref 70–110)
POCT GLUCOSE: 121 MG/DL (ref 70–110)
POCT GLUCOSE: 121 MG/DL (ref 70–110)
POCT GLUCOSE: 123 MG/DL (ref 70–110)
POCT GLUCOSE: 123 MG/DL (ref 70–110)
POCT GLUCOSE: 125 MG/DL (ref 70–110)
POCT GLUCOSE: 126 MG/DL (ref 70–110)
POCT GLUCOSE: 129 MG/DL (ref 70–110)
POCT GLUCOSE: 131 MG/DL (ref 70–110)
POCT GLUCOSE: 131 MG/DL (ref 70–110)
POCT GLUCOSE: 132 MG/DL (ref 70–110)
POCT GLUCOSE: 133 MG/DL (ref 70–110)
POCT GLUCOSE: 135 MG/DL (ref 70–110)
POCT GLUCOSE: 139 MG/DL (ref 70–110)
POCT GLUCOSE: 140 MG/DL (ref 70–110)
POCT GLUCOSE: 143 MG/DL (ref 70–110)
POCT GLUCOSE: 145 MG/DL (ref 70–110)
POCT GLUCOSE: 147 MG/DL (ref 70–110)
POCT GLUCOSE: 150 MG/DL (ref 70–110)
POCT GLUCOSE: 150 MG/DL (ref 70–110)
POCT GLUCOSE: 151 MG/DL (ref 70–110)
POCT GLUCOSE: 151 MG/DL (ref 70–110)
POCT GLUCOSE: 152 MG/DL (ref 70–110)
POCT GLUCOSE: 153 MG/DL (ref 70–110)
POCT GLUCOSE: 153 MG/DL (ref 70–110)
POCT GLUCOSE: 161 MG/DL (ref 70–110)
POCT GLUCOSE: 163 MG/DL (ref 70–110)
POCT GLUCOSE: 164 MG/DL (ref 70–110)
POCT GLUCOSE: 167 MG/DL (ref 70–110)
POCT GLUCOSE: 169 MG/DL (ref 70–110)
POCT GLUCOSE: 169 MG/DL (ref 70–110)
POCT GLUCOSE: 170 MG/DL (ref 70–110)
POCT GLUCOSE: 175 MG/DL (ref 70–110)
POCT GLUCOSE: 182 MG/DL (ref 70–110)
POCT GLUCOSE: 186 MG/DL (ref 70–110)
POCT GLUCOSE: 188 MG/DL (ref 70–110)
POCT GLUCOSE: 196 MG/DL (ref 70–110)
POCT GLUCOSE: 223 MG/DL (ref 70–110)
POCT GLUCOSE: 30 MG/DL (ref 70–110)
POCT GLUCOSE: 33 MG/DL (ref 70–110)
POCT GLUCOSE: 41 MG/DL (ref 70–110)
POCT GLUCOSE: 48 MG/DL (ref 70–110)
POCT GLUCOSE: 61 MG/DL (ref 70–110)
POCT GLUCOSE: 63 MG/DL (ref 70–110)
POCT GLUCOSE: 67 MG/DL (ref 70–110)
POCT GLUCOSE: 71 MG/DL (ref 70–110)
POCT GLUCOSE: 75 MG/DL (ref 70–110)
POCT GLUCOSE: 76 MG/DL (ref 70–110)
POCT GLUCOSE: 81 MG/DL (ref 70–110)
POCT GLUCOSE: 82 MG/DL (ref 70–110)
POCT GLUCOSE: 83 MG/DL (ref 70–110)
POCT GLUCOSE: 84 MG/DL (ref 70–110)
POCT GLUCOSE: 84 MG/DL (ref 70–110)
POCT GLUCOSE: 88 MG/DL (ref 70–110)
POCT GLUCOSE: 90 MG/DL (ref 70–110)
POCT GLUCOSE: 90 MG/DL (ref 70–110)
POCT GLUCOSE: 91 MG/DL (ref 70–110)
POCT GLUCOSE: 92 MG/DL (ref 70–110)
POCT GLUCOSE: 93 MG/DL (ref 70–110)
POCT GLUCOSE: 93 MG/DL (ref 70–110)
POCT GLUCOSE: 95 MG/DL (ref 70–110)
POCT GLUCOSE: 96 MG/DL (ref 70–110)
POCT GLUCOSE: 97 MG/DL (ref 70–110)
POCT GLUCOSE: 98 MG/DL (ref 70–110)
POCT GLUCOSE: 98 MG/DL (ref 70–110)
POCT GLUCOSE: 99 MG/DL (ref 70–110)
POIKILOCYTOSIS BLD QL SMEAR: ABNORMAL
POIKILOCYTOSIS BLD QL SMEAR: SLIGHT
POLYCHROMASIA BLD QL SMEAR: ABNORMAL
POTASSIUM BLD-SCNC: 4.5 MMOL/L (ref 3.5–5.1)
POTASSIUM BLD-SCNC: 4.5 MMOL/L (ref 3.5–5.1)
POTASSIUM SERPL-SCNC: 3.7 MMOL/L (ref 3.5–5.1)
POTASSIUM SERPL-SCNC: 3.8 MMOL/L (ref 3.5–5.1)
POTASSIUM SERPL-SCNC: 3.9 MMOL/L (ref 3.5–5.1)
POTASSIUM SERPL-SCNC: 4 MMOL/L (ref 3.5–5.1)
POTASSIUM SERPL-SCNC: 4.1 MMOL/L (ref 3.5–5.1)
POTASSIUM SERPL-SCNC: 4.2 MMOL/L (ref 3.5–5.1)
POTASSIUM SERPL-SCNC: 4.3 MMOL/L (ref 3.5–5.1)
POTASSIUM SERPL-SCNC: 4.4 MMOL/L (ref 3.5–5.1)
POTASSIUM SERPL-SCNC: 4.5 MMOL/L (ref 3.5–5.1)
POTASSIUM SERPL-SCNC: 4.6 MMOL/L (ref 3.5–5.1)
POTASSIUM SERPL-SCNC: 4.7 MMOL/L (ref 3.5–5.1)
POTASSIUM SERPL-SCNC: 4.8 MMOL/L (ref 3.5–5.1)
POTASSIUM SERPL-SCNC: 4.9 MMOL/L (ref 3.5–5.1)
POTASSIUM SERPL-SCNC: 4.9 MMOL/L (ref 3.5–5.1)
POTASSIUM SERPL-SCNC: 5 MMOL/L (ref 3.5–5.1)
POTASSIUM SERPL-SCNC: 5.2 MMOL/L (ref 3.5–5.1)
POTASSIUM SERPL-SCNC: 5.3 MMOL/L (ref 3.5–5.1)
PROT C ACT/NOR PPP CHRO: 75 % (ref 70–150)
PROT S ACT/NOR PPP: 65 % (ref 65–150)
PROT SERPL-MCNC: 3.6 G/DL (ref 6–8.4)
PROT SERPL-MCNC: 4 G/DL (ref 6–8.4)
PROT SERPL-MCNC: 4.2 G/DL (ref 6–8.4)
PROT SERPL-MCNC: 4.3 G/DL (ref 6–8.4)
PROT SERPL-MCNC: 4.3 G/DL (ref 6–8.4)
PROT SERPL-MCNC: 4.4 G/DL (ref 6–8.4)
PROT SERPL-MCNC: 4.5 G/DL (ref 6–8.4)
PROT SERPL-MCNC: 4.6 G/DL (ref 6–8.4)
PROT SERPL-MCNC: 4.8 G/DL (ref 6–8.4)
PROT SERPL-MCNC: 4.8 G/DL (ref 6–8.4)
PROT SERPL-MCNC: 4.9 G/DL (ref 6–8.4)
PROT SERPL-MCNC: 5.1 G/DL (ref 6–8.4)
PROT SERPL-MCNC: 5.2 G/DL (ref 6–8.4)
PROT SERPL-MCNC: 5.2 G/DL (ref 6–8.4)
PROT SERPL-MCNC: 5.3 G/DL (ref 6–8.4)
PROT SERPL-MCNC: 5.3 G/DL (ref 6–8.4)
PROT SERPL-MCNC: 5.4 G/DL (ref 6–8.4)
PROT SERPL-MCNC: 5.5 G/DL (ref 6–8.4)
PROT SERPL-MCNC: 5.6 G/DL (ref 6–8.4)
PROT SERPL-MCNC: 6.1 G/DL (ref 6–8.4)
PROT SERPL-MCNC: 6.3 G/DL (ref 6–8.4)
PROT SERPL-MCNC: 6.5 G/DL (ref 6–8.4)
PROT SERPL-MCNC: 6.6 G/DL (ref 6–8.4)
PROTHROMBIN TIME: 11.4 SEC (ref 9–12.5)
PROTHROMBIN TIME: 11.9 SEC (ref 9–12.5)
PROTHROMBIN TIME: 12 SEC (ref 9–12.5)
PROTHROMBIN TIME: 12.2 SEC (ref 9–12.5)
PROTHROMBIN TIME: 14 SEC (ref 9–12.5)
PROTHROMBIN TIME: 14 SEC (ref 9–12.5)
PROTHROMBIN TIME: 22 SEC (ref 9–12.5)
PROTHROMBIN TIME: 24.9 SEC (ref 9–12.5)
PULM VEIN S/D RATIO: 0.61
PV PEAK D VEL: 0.23 M/S
PV PEAK S VEL: 0.14 M/S
RA MAJOR: 5.77 CM
RA MAJOR: 6.28 CM
RA PRESSURE ESTIMATED: 15 MMHG
RA WIDTH: 4.25 CM
RA WIDTH: 4.57 CM
RBC # BLD AUTO: 2.23 M/UL (ref 4–5.4)
RBC # BLD AUTO: 2.5 M/UL (ref 4–5.4)
RBC # BLD AUTO: 2.64 M/UL (ref 4–5.4)
RBC # BLD AUTO: 2.67 M/UL (ref 4–5.4)
RBC # BLD AUTO: 2.7 M/UL (ref 4–5.4)
RBC # BLD AUTO: 2.73 M/UL (ref 4–5.4)
RBC # BLD AUTO: 2.74 M/UL (ref 4–5.4)
RBC # BLD AUTO: 2.75 M/UL (ref 4–5.4)
RBC # BLD AUTO: 2.77 M/UL (ref 4–5.4)
RBC # BLD AUTO: 2.77 M/UL (ref 4–5.4)
RBC # BLD AUTO: 2.79 M/UL (ref 4–5.4)
RBC # BLD AUTO: 2.84 M/UL (ref 4–5.4)
RBC # BLD AUTO: 2.87 M/UL (ref 4–5.4)
RBC # BLD AUTO: 2.87 M/UL (ref 4–5.4)
RBC # BLD AUTO: 2.88 M/UL (ref 4–5.4)
RBC # BLD AUTO: 2.92 M/UL (ref 4–5.4)
RBC # BLD AUTO: 2.94 M/UL (ref 4–5.4)
RBC # BLD AUTO: 3.04 M/UL (ref 4–5.4)
RBC # BLD AUTO: 3.06 M/UL (ref 4–5.4)
RBC # BLD AUTO: 3.2 M/UL (ref 4–5.4)
RBC # BLD AUTO: 3.2 M/UL (ref 4–5.4)
RBC # BLD AUTO: 3.3 M/UL (ref 4–5.4)
RBC # BLD AUTO: 3.31 M/UL (ref 4–5.4)
RBC # BLD AUTO: 3.39 M/UL (ref 4–5.4)
RBC # BLD AUTO: 3.4 M/UL (ref 4–5.4)
RBC # BLD AUTO: 3.48 M/UL (ref 4–5.4)
RBC # BLD AUTO: 3.49 M/UL (ref 4–5.4)
RBC # BLD AUTO: 3.69 M/UL (ref 4–5.4)
RBC # BLD AUTO: 3.75 M/UL (ref 4–5.4)
RBC # BLD AUTO: 3.82 M/UL (ref 4–5.4)
RBC # BLD AUTO: 3.82 M/UL (ref 4–5.4)
RBC # BLD AUTO: 3.88 M/UL (ref 4–5.4)
RBC # BLD AUTO: 3.96 M/UL (ref 4–5.4)
RBC # BLD AUTO: 3.99 M/UL (ref 4–5.4)
RBC # BLD AUTO: 4.02 M/UL (ref 4–5.4)
RBC # BLD AUTO: 4.02 M/UL (ref 4–5.4)
RBC # BLD AUTO: 4.05 M/UL (ref 4–5.4)
RBC # BLD AUTO: 4.06 M/UL (ref 4–5.4)
RBC # BLD AUTO: 4.09 M/UL (ref 4–5.4)
RBC # BLD AUTO: 4.11 M/UL (ref 4–5.4)
RBC # BLD AUTO: 4.19 M/UL (ref 4–5.4)
RBC # BLD AUTO: 4.2 M/UL (ref 4–5.4)
RBC # BLD AUTO: 4.44 M/UL (ref 4–5.4)
RBC # BLD AUTO: 4.51 M/UL (ref 4–5.4)
RETICS/RBC NFR AUTO: 4.7 % (ref 0.5–2.5)
RIGHT VENTRICLE DIASTOLIC BASEL DIMENSION: 4.3 CM
RIGHT VENTRICLE DIASTOLIC BASEL DIMENSION: 4.4 CM
RV TB RVSP: 18 MMHG
SAMPLE: ABNORMAL
SATURATED IRON: 10 % (ref 20–50)
SATURATED IRON: 13 % (ref 20–50)
SCHISTOCYTES BLD QL SMEAR: ABNORMAL
SCHISTOCYTES BLD QL SMEAR: PRESENT
SINUS: 3.16 CM
SINUS: 3.59 CM
SITE: ABNORMAL
SODIUM BLD-SCNC: 133 MMOL/L (ref 136–145)
SODIUM BLD-SCNC: 134 MMOL/L (ref 136–145)
SODIUM SERPL-SCNC: 130 MMOL/L (ref 136–145)
SODIUM SERPL-SCNC: 131 MMOL/L (ref 136–145)
SODIUM SERPL-SCNC: 131 MMOL/L (ref 136–145)
SODIUM SERPL-SCNC: 132 MMOL/L (ref 136–145)
SODIUM SERPL-SCNC: 133 MMOL/L (ref 136–145)
SODIUM SERPL-SCNC: 134 MMOL/L (ref 136–145)
SODIUM SERPL-SCNC: 135 MMOL/L (ref 136–145)
SODIUM SERPL-SCNC: 136 MMOL/L (ref 136–145)
SODIUM SERPL-SCNC: 137 MMOL/L (ref 136–145)
SODIUM SERPL-SCNC: 138 MMOL/L (ref 136–145)
SODIUM SERPL-SCNC: 139 MMOL/L (ref 136–145)
SODIUM SERPL-SCNC: 140 MMOL/L (ref 136–145)
SODIUM SERPL-SCNC: 141 MMOL/L (ref 136–145)
SODIUM SERPL-SCNC: 144 MMOL/L (ref 136–145)
SP02: 100
SP02: 50
SP02: 99
SPECIMEN OUTDATE: NORMAL
SPECIMEN OUTDATE: NORMAL
SPHEROCYTES BLD QL SMEAR: ABNORMAL
SPONT RATE: 30
SPONT RATE: 30
STJ: 2.64 CM
STJ: 2.77 CM
TARGETS BLD QL SMEAR: ABNORMAL
TDI LATERAL: 0.05 M/S
TDI LATERAL: 0.08 M/S
TDI SEPTAL: 0.05 M/S
TDI SEPTAL: 0.07 M/S
TDI: 0.06 M/S
TDI: 0.07 M/S
TOTAL IRON BINDING CAPACITY: 157 UG/DL (ref 250–450)
TOTAL IRON BINDING CAPACITY: 173 UG/DL (ref 250–450)
TOXIC GRANULES BLD QL SMEAR: PRESENT
TR MAX PG: 46 MMHG
TR MAX PG: 52 MMHG
TRANSFERRIN SERPL-MCNC: 106 MG/DL (ref 200–375)
TRANSFERRIN SERPL-MCNC: 117 MG/DL (ref 200–375)
TRANSFERRIN SERPL-MCNC: 117 MG/DL (ref 200–375)
TRICUSPID ANNULAR PLANE SYSTOLIC EXCURSION: 1.15 CM
TRICUSPID ANNULAR PLANE SYSTOLIC EXCURSION: 1.29 CM
TSH SERPL DL<=0.005 MIU/L-ACNC: 2.73 UIU/ML (ref 0.4–4)
TV REST PULMONARY ARTERY PRESSURE: 61 MMHG
VANCOMYCIN SERPL-MCNC: 10.7 UG/ML
VANCOMYCIN SERPL-MCNC: 15 UG/ML
VANCOMYCIN SERPL-MCNC: 15.1 UG/ML
VANCOMYCIN SERPL-MCNC: 20 UG/ML
WBC # BLD AUTO: 10.1 K/UL (ref 3.9–12.7)
WBC # BLD AUTO: 10.33 K/UL (ref 3.9–12.7)
WBC # BLD AUTO: 10.9 K/UL (ref 3.9–12.7)
WBC # BLD AUTO: 10.9 K/UL (ref 3.9–12.7)
WBC # BLD AUTO: 11.08 K/UL (ref 3.9–12.7)
WBC # BLD AUTO: 12.09 K/UL (ref 3.9–12.7)
WBC # BLD AUTO: 12.36 K/UL (ref 3.9–12.7)
WBC # BLD AUTO: 12.78 K/UL (ref 3.9–12.7)
WBC # BLD AUTO: 13.41 K/UL (ref 3.9–12.7)
WBC # BLD AUTO: 14.15 K/UL (ref 3.9–12.7)
WBC # BLD AUTO: 14.49 K/UL (ref 3.9–12.7)
WBC # BLD AUTO: 14.69 K/UL (ref 3.9–12.7)
WBC # BLD AUTO: 14.98 K/UL (ref 3.9–12.7)
WBC # BLD AUTO: 15.25 K/UL (ref 3.9–12.7)
WBC # BLD AUTO: 15.38 K/UL (ref 3.9–12.7)
WBC # BLD AUTO: 15.38 K/UL (ref 3.9–12.7)
WBC # BLD AUTO: 16.28 K/UL (ref 3.9–12.7)
WBC # BLD AUTO: 16.63 K/UL (ref 3.9–12.7)
WBC # BLD AUTO: 17.32 K/UL (ref 3.9–12.7)
WBC # BLD AUTO: 17.77 K/UL (ref 3.9–12.7)
WBC # BLD AUTO: 17.92 K/UL (ref 3.9–12.7)
WBC # BLD AUTO: 18.5 K/UL (ref 3.9–12.7)
WBC # BLD AUTO: 18.78 K/UL (ref 3.9–12.7)
WBC # BLD AUTO: 18.78 K/UL (ref 3.9–12.7)
WBC # BLD AUTO: 19.15 K/UL (ref 3.9–12.7)
WBC # BLD AUTO: 19.38 K/UL (ref 3.9–12.7)
WBC # BLD AUTO: 20.51 K/UL (ref 3.9–12.7)
WBC # BLD AUTO: 21.01 K/UL (ref 3.9–12.7)
WBC # BLD AUTO: 21.02 K/UL (ref 3.9–12.7)
WBC # BLD AUTO: 21.43 K/UL (ref 3.9–12.7)
WBC # BLD AUTO: 21.75 K/UL (ref 3.9–12.7)
WBC # BLD AUTO: 22.2 K/UL (ref 3.9–12.7)
WBC # BLD AUTO: 22.82 K/UL (ref 3.9–12.7)
WBC # BLD AUTO: 22.96 K/UL (ref 3.9–12.7)
WBC # BLD AUTO: 23.47 K/UL (ref 3.9–12.7)
WBC # BLD AUTO: 23.88 K/UL (ref 3.9–12.7)
WBC # BLD AUTO: 24.21 K/UL (ref 3.9–12.7)
WBC # BLD AUTO: 24.23 K/UL (ref 3.9–12.7)
WBC # BLD AUTO: 25.02 K/UL (ref 3.9–12.7)
WBC # BLD AUTO: 25.44 K/UL (ref 3.9–12.7)
WBC # BLD AUTO: 26.38 K/UL (ref 3.9–12.7)
WBC # BLD AUTO: 26.94 K/UL (ref 3.9–12.7)
WBC # BLD AUTO: 28.41 K/UL (ref 3.9–12.7)
WBC # BLD AUTO: 8.72 K/UL (ref 3.9–12.7)
WBC # BLD AUTO: 9.05 K/UL (ref 3.9–12.7)
WBC # BLD AUTO: 9.54 K/UL (ref 3.9–12.7)
WBC # BLD AUTO: 9.54 K/UL (ref 3.9–12.7)
WBC # FLD: 41 /CU MM
WBC # FLD: 88 /CU MM
Z-SCORE OF LEFT VENTRICULAR DIMENSION IN END DIASTOLE: -4.55
Z-SCORE OF LEFT VENTRICULAR DIMENSION IN END DIASTOLE: -6.48
Z-SCORE OF LEFT VENTRICULAR DIMENSION IN END SYSTOLE: -1.07
Z-SCORE OF LEFT VENTRICULAR DIMENSION IN END SYSTOLE: -2.13

## 2024-01-01 PROCEDURE — 99233 SBSQ HOSP IP/OBS HIGH 50: CPT | Mod: ,,, | Performed by: INTERNAL MEDICINE

## 2024-01-01 PROCEDURE — 94002 VENT MGMT INPAT INIT DAY: CPT

## 2024-01-01 PROCEDURE — 37000009 HC ANESTHESIA EA ADD 15 MINS: Performed by: INTERNAL MEDICINE

## 2024-01-01 PROCEDURE — 25000003 PHARM REV CODE 250: Performed by: STUDENT IN AN ORGANIZED HEALTH CARE EDUCATION/TRAINING PROGRAM

## 2024-01-01 PROCEDURE — 80069 RENAL FUNCTION PANEL: CPT | Performed by: STUDENT IN AN ORGANIZED HEALTH CARE EDUCATION/TRAINING PROGRAM

## 2024-01-01 PROCEDURE — 27000207 HC ISOLATION

## 2024-01-01 PROCEDURE — 20000000 HC ICU ROOM

## 2024-01-01 PROCEDURE — P9040 RBC LEUKOREDUCED IRRADIATED: HCPCS

## 2024-01-01 PROCEDURE — 25000003 PHARM REV CODE 250: Performed by: NURSE PRACTITIONER

## 2024-01-01 PROCEDURE — 99233 SBSQ HOSP IP/OBS HIGH 50: CPT | Mod: ,,, | Performed by: PHYSICIAN ASSISTANT

## 2024-01-01 PROCEDURE — 25000003 PHARM REV CODE 250

## 2024-01-01 PROCEDURE — 90945 DIALYSIS ONE EVALUATION: CPT

## 2024-01-01 PROCEDURE — 36589 REMOVAL TUNNELED CV CATH: CPT | Mod: LT | Performed by: INTERNAL MEDICINE

## 2024-01-01 PROCEDURE — C1752 CATH,HEMODIALYSIS,SHORT-TERM: HCPCS

## 2024-01-01 PROCEDURE — 85025 COMPLETE CBC W/AUTO DIFF WBC: CPT

## 2024-01-01 PROCEDURE — 80202 ASSAY OF VANCOMYCIN: CPT

## 2024-01-01 PROCEDURE — 63600175 PHARM REV CODE 636 W HCPCS: Performed by: INTERNAL MEDICINE

## 2024-01-01 PROCEDURE — 3E1M39Z IRRIGATION OF PERITONEAL CAVITY USING DIALYSATE, PERCUTANEOUS APPROACH: ICD-10-PCS | Performed by: INTERNAL MEDICINE

## 2024-01-01 PROCEDURE — 82962 GLUCOSE BLOOD TEST: CPT | Performed by: STUDENT IN AN ORGANIZED HEALTH CARE EDUCATION/TRAINING PROGRAM

## 2024-01-01 PROCEDURE — 36415 COLL VENOUS BLD VENIPUNCTURE: CPT | Performed by: STUDENT IN AN ORGANIZED HEALTH CARE EDUCATION/TRAINING PROGRAM

## 2024-01-01 PROCEDURE — 85730 THROMBOPLASTIN TIME PARTIAL: CPT | Mod: 91

## 2024-01-01 PROCEDURE — 27200950 HC CAPD SUPPORT

## 2024-01-01 PROCEDURE — 80069 RENAL FUNCTION PANEL: CPT | Mod: 91 | Performed by: NURSE PRACTITIONER

## 2024-01-01 PROCEDURE — 25000003 PHARM REV CODE 250: Performed by: INTERNAL MEDICINE

## 2024-01-01 PROCEDURE — 97110 THERAPEUTIC EXERCISES: CPT

## 2024-01-01 PROCEDURE — 25000003 PHARM REV CODE 250: Performed by: HOSPITALIST

## 2024-01-01 PROCEDURE — 80069 RENAL FUNCTION PANEL: CPT

## 2024-01-01 PROCEDURE — 94660 CPAP INITIATION&MGMT: CPT

## 2024-01-01 PROCEDURE — 27000190 HC CPAP FULL FACE MASK W/VALVE

## 2024-01-01 PROCEDURE — 97112 NEUROMUSCULAR REEDUCATION: CPT | Mod: CQ

## 2024-01-01 PROCEDURE — 85384 FIBRINOGEN ACTIVITY: CPT | Performed by: INTERNAL MEDICINE

## 2024-01-01 PROCEDURE — 82803 BLOOD GASES ANY COMBINATION: CPT

## 2024-01-01 PROCEDURE — 27000221 HC OXYGEN, UP TO 24 HOURS

## 2024-01-01 PROCEDURE — 99900035 HC TECH TIME PER 15 MIN (STAT)

## 2024-01-01 PROCEDURE — 27000923 HC TRIALYSIS CATHETER, ANY SIZE

## 2024-01-01 PROCEDURE — 80053 COMPREHEN METABOLIC PANEL: CPT

## 2024-01-01 PROCEDURE — 63600175 PHARM REV CODE 636 W HCPCS

## 2024-01-01 PROCEDURE — 99292 CRITICAL CARE ADDL 30 MIN: CPT | Mod: FS,,, | Performed by: INTERNAL MEDICINE

## 2024-01-01 PROCEDURE — 84100 ASSAY OF PHOSPHORUS: CPT

## 2024-01-01 PROCEDURE — 97535 SELF CARE MNGMENT TRAINING: CPT

## 2024-01-01 PROCEDURE — 83735 ASSAY OF MAGNESIUM: CPT

## 2024-01-01 PROCEDURE — 63600175 PHARM REV CODE 636 W HCPCS: Mod: JZ,JG

## 2024-01-01 PROCEDURE — 97530 THERAPEUTIC ACTIVITIES: CPT

## 2024-01-01 PROCEDURE — 99233 SBSQ HOSP IP/OBS HIGH 50: CPT | Mod: GC,,, | Performed by: STUDENT IN AN ORGANIZED HEALTH CARE EDUCATION/TRAINING PROGRAM

## 2024-01-01 PROCEDURE — 20600001 HC STEP DOWN PRIVATE ROOM

## 2024-01-01 PROCEDURE — 80100014 HC HEMODIALYSIS 1:1

## 2024-01-01 PROCEDURE — 63600175 PHARM REV CODE 636 W HCPCS: Performed by: NURSE PRACTITIONER

## 2024-01-01 PROCEDURE — 85027 COMPLETE CBC AUTOMATED: CPT

## 2024-01-01 PROCEDURE — 80069 RENAL FUNCTION PANEL: CPT | Mod: 91 | Performed by: STUDENT IN AN ORGANIZED HEALTH CARE EDUCATION/TRAINING PROGRAM

## 2024-01-01 PROCEDURE — 85610 PROTHROMBIN TIME: CPT | Performed by: PHYSICIAN ASSISTANT

## 2024-01-01 PROCEDURE — 94761 N-INVAS EAR/PLS OXIMETRY MLT: CPT

## 2024-01-01 PROCEDURE — 83735 ASSAY OF MAGNESIUM: CPT | Performed by: STUDENT IN AN ORGANIZED HEALTH CARE EDUCATION/TRAINING PROGRAM

## 2024-01-01 PROCEDURE — 85730 THROMBOPLASTIN TIME PARTIAL: CPT | Performed by: INTERNAL MEDICINE

## 2024-01-01 PROCEDURE — 85730 THROMBOPLASTIN TIME PARTIAL: CPT | Mod: 91 | Performed by: STUDENT IN AN ORGANIZED HEALTH CARE EDUCATION/TRAINING PROGRAM

## 2024-01-01 PROCEDURE — 97112 NEUROMUSCULAR REEDUCATION: CPT

## 2024-01-01 PROCEDURE — 5A1935Z RESPIRATORY VENTILATION, LESS THAN 24 CONSECUTIVE HOURS: ICD-10-PCS | Performed by: INTERNAL MEDICINE

## 2024-01-01 PROCEDURE — 85730 THROMBOPLASTIN TIME PARTIAL: CPT

## 2024-01-01 PROCEDURE — 63600175 PHARM REV CODE 636 W HCPCS: Mod: JZ,JG | Performed by: STUDENT IN AN ORGANIZED HEALTH CARE EDUCATION/TRAINING PROGRAM

## 2024-01-01 PROCEDURE — 25000003 PHARM REV CODE 250: Performed by: PHYSICIAN ASSISTANT

## 2024-01-01 PROCEDURE — 99900026 HC AIRWAY MAINTENANCE (STAT)

## 2024-01-01 PROCEDURE — 25000003 PHARM REV CODE 250: Performed by: GENERAL ACUTE CARE HOSPITAL

## 2024-01-01 PROCEDURE — 36415 COLL VENOUS BLD VENIPUNCTURE: CPT

## 2024-01-01 PROCEDURE — 77001 FLUOROGUIDE FOR VEIN DEVICE: CPT | Mod: 26,,, | Performed by: INTERNAL MEDICINE

## 2024-01-01 PROCEDURE — 99291 CRITICAL CARE FIRST HOUR: CPT | Mod: ,,, | Performed by: NURSE PRACTITIONER

## 2024-01-01 PROCEDURE — 63600175 PHARM REV CODE 636 W HCPCS: Performed by: STUDENT IN AN ORGANIZED HEALTH CARE EDUCATION/TRAINING PROGRAM

## 2024-01-01 PROCEDURE — 99498 ADVNCD CARE PLAN ADDL 30 MIN: CPT | Mod: ,,, | Performed by: INTERNAL MEDICINE

## 2024-01-01 PROCEDURE — 63600150 PHARM REV CODE 636: Performed by: STUDENT IN AN ORGANIZED HEALTH CARE EDUCATION/TRAINING PROGRAM

## 2024-01-01 PROCEDURE — 87449 NOS EACH ORGANISM AG IA: CPT | Performed by: INTERNAL MEDICINE

## 2024-01-01 PROCEDURE — 85025 COMPLETE CBC W/AUTO DIFF WBC: CPT | Mod: 91 | Performed by: INTERNAL MEDICINE

## 2024-01-01 PROCEDURE — 86920 COMPATIBILITY TEST SPIN: CPT

## 2024-01-01 PROCEDURE — 97530 THERAPEUTIC ACTIVITIES: CPT | Mod: CQ

## 2024-01-01 PROCEDURE — 80048 BASIC METABOLIC PNL TOTAL CA: CPT | Performed by: INTERNAL MEDICINE

## 2024-01-01 PROCEDURE — 97165 OT EVAL LOW COMPLEX 30 MIN: CPT

## 2024-01-01 PROCEDURE — 85730 THROMBOPLASTIN TIME PARTIAL: CPT | Mod: 91 | Performed by: INTERNAL MEDICINE

## 2024-01-01 PROCEDURE — 99499 UNLISTED E&M SERVICE: CPT | Mod: ,,,

## 2024-01-01 PROCEDURE — 83605 ASSAY OF LACTIC ACID: CPT

## 2024-01-01 PROCEDURE — P9047 ALBUMIN (HUMAN), 25%, 50ML: HCPCS | Mod: JZ,JG

## 2024-01-01 PROCEDURE — 30233N1 TRANSFUSION OF NONAUTOLOGOUS RED BLOOD CELLS INTO PERIPHERAL VEIN, PERCUTANEOUS APPROACH: ICD-10-PCS | Performed by: INTERNAL MEDICINE

## 2024-01-01 PROCEDURE — 63600175 PHARM REV CODE 636 W HCPCS: Mod: JG

## 2024-01-01 PROCEDURE — 94761 N-INVAS EAR/PLS OXIMETRY MLT: CPT | Mod: XB

## 2024-01-01 PROCEDURE — 86022 PLATELET ANTIBODIES: CPT | Performed by: STUDENT IN AN ORGANIZED HEALTH CARE EDUCATION/TRAINING PROGRAM

## 2024-01-01 PROCEDURE — 99232 SBSQ HOSP IP/OBS MODERATE 35: CPT | Mod: ,,, | Performed by: INTERNAL MEDICINE

## 2024-01-01 PROCEDURE — 99233 SBSQ HOSP IP/OBS HIGH 50: CPT | Mod: ,,,

## 2024-01-01 PROCEDURE — 36415 COLL VENOUS BLD VENIPUNCTURE: CPT | Mod: XB | Performed by: HOSPITALIST

## 2024-01-01 PROCEDURE — 25000003 PHARM REV CODE 250: Performed by: EMERGENCY MEDICINE

## 2024-01-01 PROCEDURE — 85610 PROTHROMBIN TIME: CPT

## 2024-01-01 PROCEDURE — 87045 FECES CULTURE AEROBIC BACT: CPT | Performed by: CLINICAL NURSE SPECIALIST

## 2024-01-01 PROCEDURE — 80069 RENAL FUNCTION PANEL: CPT | Performed by: INTERNAL MEDICINE

## 2024-01-01 PROCEDURE — 83735 ASSAY OF MAGNESIUM: CPT | Performed by: INTERNAL MEDICINE

## 2024-01-01 PROCEDURE — 99291 CRITICAL CARE FIRST HOUR: CPT | Mod: ,,,

## 2024-01-01 PROCEDURE — 85007 BL SMEAR W/DIFF WBC COUNT: CPT

## 2024-01-01 PROCEDURE — 92526 ORAL FUNCTION THERAPY: CPT

## 2024-01-01 PROCEDURE — 36415 COLL VENOUS BLD VENIPUNCTURE: CPT | Performed by: HOSPITALIST

## 2024-01-01 PROCEDURE — 37000008 HC ANESTHESIA 1ST 15 MINUTES: Performed by: INTERNAL MEDICINE

## 2024-01-01 PROCEDURE — 90935 HEMODIALYSIS ONE EVALUATION: CPT

## 2024-01-01 PROCEDURE — 36410 VNPNXR 3YR/> PHY/QHP DX/THER: CPT

## 2024-01-01 PROCEDURE — 36556 INSERT NON-TUNNEL CV CATH: CPT

## 2024-01-01 PROCEDURE — 63600175 PHARM REV CODE 636 W HCPCS: Performed by: HOSPITALIST

## 2024-01-01 PROCEDURE — 87046 STOOL CULTR AEROBIC BACT EA: CPT | Performed by: CLINICAL NURSE SPECIALIST

## 2024-01-01 PROCEDURE — 83735 ASSAY OF MAGNESIUM: CPT | Mod: 91 | Performed by: STUDENT IN AN ORGANIZED HEALTH CARE EDUCATION/TRAINING PROGRAM

## 2024-01-01 PROCEDURE — 99233 SBSQ HOSP IP/OBS HIGH 50: CPT | Mod: ,,, | Performed by: GENERAL ACUTE CARE HOSPITAL

## 2024-01-01 PROCEDURE — 63600175 PHARM REV CODE 636 W HCPCS: Mod: JZ,JG | Performed by: GENERAL ACUTE CARE HOSPITAL

## 2024-01-01 PROCEDURE — 83615 LACTATE (LD) (LDH) ENZYME: CPT

## 2024-01-01 PROCEDURE — 02HV33Z INSERTION OF INFUSION DEVICE INTO SUPERIOR VENA CAVA, PERCUTANEOUS APPROACH: ICD-10-PCS | Performed by: INTERNAL MEDICINE

## 2024-01-01 PROCEDURE — 84443 ASSAY THYROID STIM HORMONE: CPT

## 2024-01-01 PROCEDURE — 83735 ASSAY OF MAGNESIUM: CPT | Mod: 91 | Performed by: INTERNAL MEDICINE

## 2024-01-01 PROCEDURE — 85610 PROTHROMBIN TIME: CPT | Performed by: INTERNAL MEDICINE

## 2024-01-01 PROCEDURE — 27100171 HC OXYGEN HIGH FLOW UP TO 24 HOURS

## 2024-01-01 PROCEDURE — 89051 BODY FLUID CELL COUNT: CPT | Performed by: STUDENT IN AN ORGANIZED HEALTH CARE EDUCATION/TRAINING PROGRAM

## 2024-01-01 PROCEDURE — 02H633Z INSERTION OF INFUSION DEVICE INTO RIGHT ATRIUM, PERCUTANEOUS APPROACH: ICD-10-PCS | Performed by: INTERNAL MEDICINE

## 2024-01-01 PROCEDURE — 86900 BLOOD TYPING SEROLOGIC ABO: CPT

## 2024-01-01 PROCEDURE — 83010 ASSAY OF HAPTOGLOBIN QUANT: CPT

## 2024-01-01 PROCEDURE — 85730 THROMBOPLASTIN TIME PARTIAL: CPT | Performed by: STUDENT IN AN ORGANIZED HEALTH CARE EDUCATION/TRAINING PROGRAM

## 2024-01-01 PROCEDURE — 36000706: Performed by: STUDENT IN AN ORGANIZED HEALTH CARE EDUCATION/TRAINING PROGRAM

## 2024-01-01 PROCEDURE — 90935 HEMODIALYSIS ONE EVALUATION: CPT | Mod: ,,, | Performed by: NURSE PRACTITIONER

## 2024-01-01 PROCEDURE — 31622 DX BRONCHOSCOPE/WASH: CPT | Mod: ,,, | Performed by: STUDENT IN AN ORGANIZED HEALTH CARE EDUCATION/TRAINING PROGRAM

## 2024-01-01 PROCEDURE — 99900031 HC PATIENT EDUCATION (STAT)

## 2024-01-01 PROCEDURE — 99291 CRITICAL CARE FIRST HOUR: CPT | Mod: GC,,, | Performed by: INTERNAL MEDICINE

## 2024-01-01 PROCEDURE — 85730 THROMBOPLASTIN TIME PARTIAL: CPT | Performed by: PHYSICIAN ASSISTANT

## 2024-01-01 PROCEDURE — 36415 COLL VENOUS BLD VENIPUNCTURE: CPT | Mod: XB | Performed by: INTERNAL MEDICINE

## 2024-01-01 PROCEDURE — 84100 ASSAY OF PHOSPHORUS: CPT | Performed by: INTERNAL MEDICINE

## 2024-01-01 PROCEDURE — 80069 RENAL FUNCTION PANEL: CPT | Mod: 91 | Performed by: INTERNAL MEDICINE

## 2024-01-01 PROCEDURE — 63600175 PHARM REV CODE 636 W HCPCS: Mod: JZ,JG | Performed by: NURSE PRACTITIONER

## 2024-01-01 PROCEDURE — 36558 INSERT TUNNELED CV CATH: CPT | Mod: LT,,, | Performed by: INTERNAL MEDICINE

## 2024-01-01 PROCEDURE — 94003 VENT MGMT INPAT SUBQ DAY: CPT

## 2024-01-01 PROCEDURE — P9016 RBC LEUKOCYTES REDUCED: HCPCS

## 2024-01-01 PROCEDURE — 80076 HEPATIC FUNCTION PANEL: CPT

## 2024-01-01 PROCEDURE — 36558 INSERT TUNNELED CV CATH: CPT | Mod: LT | Performed by: INTERNAL MEDICINE

## 2024-01-01 PROCEDURE — 36415 COLL VENOUS BLD VENIPUNCTURE: CPT | Mod: XB | Performed by: STUDENT IN AN ORGANIZED HEALTH CARE EDUCATION/TRAINING PROGRAM

## 2024-01-01 PROCEDURE — 97530 THERAPEUTIC ACTIVITIES: CPT | Mod: CO

## 2024-01-01 PROCEDURE — 85306 CLOT INHIBIT PROT S FREE: CPT | Performed by: STUDENT IN AN ORGANIZED HEALTH CARE EDUCATION/TRAINING PROGRAM

## 2024-01-01 PROCEDURE — 63600175 PHARM REV CODE 636 W HCPCS: Performed by: PHYSICIAN ASSISTANT

## 2024-01-01 PROCEDURE — 81241 F5 GENE: CPT | Performed by: STUDENT IN AN ORGANIZED HEALTH CARE EDUCATION/TRAINING PROGRAM

## 2024-01-01 PROCEDURE — 87040 BLOOD CULTURE FOR BACTERIA: CPT | Performed by: NURSE PRACTITIONER

## 2024-01-01 PROCEDURE — 36593 DECLOT VASCULAR DEVICE: CPT

## 2024-01-01 PROCEDURE — 99497 ADVNCD CARE PLAN 30 MIN: CPT | Mod: 25,,, | Performed by: INTERNAL MEDICINE

## 2024-01-01 PROCEDURE — 87070 CULTURE OTHR SPECIMN AEROBIC: CPT | Performed by: STUDENT IN AN ORGANIZED HEALTH CARE EDUCATION/TRAINING PROGRAM

## 2024-01-01 PROCEDURE — 97110 THERAPEUTIC EXERCISES: CPT | Mod: CQ

## 2024-01-01 PROCEDURE — 92610 EVALUATE SWALLOWING FUNCTION: CPT

## 2024-01-01 PROCEDURE — 0JH63XZ INSERTION OF TUNNELED VASCULAR ACCESS DEVICE INTO CHEST SUBCUTANEOUS TISSUE AND FASCIA, PERCUTANEOUS APPROACH: ICD-10-PCS | Performed by: INTERNAL MEDICINE

## 2024-01-01 PROCEDURE — 83735 ASSAY OF MAGNESIUM: CPT | Mod: 91 | Performed by: NURSE PRACTITIONER

## 2024-01-01 PROCEDURE — 97535 SELF CARE MNGMENT TRAINING: CPT | Mod: CO

## 2024-01-01 PROCEDURE — 99232 SBSQ HOSP IP/OBS MODERATE 35: CPT | Mod: GC,,, | Performed by: INTERNAL MEDICINE

## 2024-01-01 PROCEDURE — 63600175 PHARM REV CODE 636 W HCPCS: Performed by: EMERGENCY MEDICINE

## 2024-01-01 PROCEDURE — 82140 ASSAY OF AMMONIA: CPT

## 2024-01-01 PROCEDURE — 5A1D90Z PERFORMANCE OF URINARY FILTRATION, CONTINUOUS, GREATER THAN 18 HOURS PER DAY: ICD-10-PCS | Performed by: INTERNAL MEDICINE

## 2024-01-01 PROCEDURE — 36415 COLL VENOUS BLD VENIPUNCTURE: CPT | Performed by: INTERNAL MEDICINE

## 2024-01-01 PROCEDURE — 99223 1ST HOSP IP/OBS HIGH 75: CPT | Mod: GC,,, | Performed by: STUDENT IN AN ORGANIZED HEALTH CARE EDUCATION/TRAINING PROGRAM

## 2024-01-01 PROCEDURE — 63700000 PHARM REV CODE 250 ALT 637 W/O HCPCS: Performed by: STUDENT IN AN ORGANIZED HEALTH CARE EDUCATION/TRAINING PROGRAM

## 2024-01-01 PROCEDURE — 99291 CRITICAL CARE FIRST HOUR: CPT | Mod: GC,,, | Performed by: EMERGENCY MEDICINE

## 2024-01-01 PROCEDURE — 27201247 HC HEMODIALYSIS, SET-UP & CANCEL

## 2024-01-01 PROCEDURE — 97168 OT RE-EVAL EST PLAN CARE: CPT

## 2024-01-01 PROCEDURE — C1769 GUIDE WIRE: HCPCS | Performed by: INTERNAL MEDICINE

## 2024-01-01 PROCEDURE — 99222 1ST HOSP IP/OBS MODERATE 55: CPT | Mod: ,,, | Performed by: NURSE PRACTITIONER

## 2024-01-01 PROCEDURE — 85025 COMPLETE CBC W/AUTO DIFF WBC: CPT | Mod: 91 | Performed by: PHYSICIAN ASSISTANT

## 2024-01-01 PROCEDURE — 83735 ASSAY OF MAGNESIUM: CPT | Mod: 91

## 2024-01-01 PROCEDURE — 80053 COMPREHEN METABOLIC PANEL: CPT | Performed by: EMERGENCY MEDICINE

## 2024-01-01 PROCEDURE — 87427 SHIGA-LIKE TOXIN AG IA: CPT | Performed by: CLINICAL NURSE SPECIALIST

## 2024-01-01 PROCEDURE — 86704 HEP B CORE ANTIBODY TOTAL: CPT

## 2024-01-01 PROCEDURE — 82800 BLOOD PH: CPT

## 2024-01-01 PROCEDURE — 85027 COMPLETE CBC AUTOMATED: CPT | Mod: 91 | Performed by: INTERNAL MEDICINE

## 2024-01-01 PROCEDURE — 86022 PLATELET ANTIBODIES: CPT | Performed by: INTERNAL MEDICINE

## 2024-01-01 PROCEDURE — 99291 CRITICAL CARE FIRST HOUR: CPT | Mod: ,,, | Performed by: INTERNAL MEDICINE

## 2024-01-01 PROCEDURE — 97162 PT EVAL MOD COMPLEX 30 MIN: CPT

## 2024-01-01 PROCEDURE — 87209 SMEAR COMPLEX STAIN: CPT | Performed by: CLINICAL NURSE SPECIALIST

## 2024-01-01 PROCEDURE — 80100008 HC CRRT DAILY MAINTENANCE

## 2024-01-01 PROCEDURE — 85007 BL SMEAR W/DIFF WBC COUNT: CPT | Mod: 91 | Performed by: INTERNAL MEDICINE

## 2024-01-01 PROCEDURE — 99233 SBSQ HOSP IP/OBS HIGH 50: CPT | Mod: ,,, | Performed by: STUDENT IN AN ORGANIZED HEALTH CARE EDUCATION/TRAINING PROGRAM

## 2024-01-01 PROCEDURE — C1751 CATH, INF, PER/CENT/MIDLINE: HCPCS

## 2024-01-01 PROCEDURE — 0JPTXXZ REMOVAL OF TUNNELED VASCULAR ACCESS DEVICE FROM TRUNK SUBCUTANEOUS TISSUE AND FASCIA, EXTERNAL APPROACH: ICD-10-PCS | Performed by: INTERNAL MEDICINE

## 2024-01-01 PROCEDURE — 86850 RBC ANTIBODY SCREEN: CPT

## 2024-01-01 PROCEDURE — 63600175 PHARM REV CODE 636 W HCPCS: Mod: JZ,JG | Performed by: INTERNAL MEDICINE

## 2024-01-01 PROCEDURE — 86140 C-REACTIVE PROTEIN: CPT

## 2024-01-01 PROCEDURE — 06H033Z INSERTION OF INFUSION DEVICE INTO INFERIOR VENA CAVA, PERCUTANEOUS APPROACH: ICD-10-PCS | Performed by: INTERNAL MEDICINE

## 2024-01-01 PROCEDURE — 82330 ASSAY OF CALCIUM: CPT

## 2024-01-01 PROCEDURE — 85027 COMPLETE CBC AUTOMATED: CPT | Mod: 91

## 2024-01-01 PROCEDURE — 0BJ08ZZ INSPECTION OF TRACHEOBRONCHIAL TREE, VIA NATURAL OR ARTIFICIAL OPENING ENDOSCOPIC: ICD-10-PCS | Performed by: STUDENT IN AN ORGANIZED HEALTH CARE EDUCATION/TRAINING PROGRAM

## 2024-01-01 PROCEDURE — 87040 BLOOD CULTURE FOR BACTERIA: CPT | Mod: 59 | Performed by: STUDENT IN AN ORGANIZED HEALTH CARE EDUCATION/TRAINING PROGRAM

## 2024-01-01 PROCEDURE — P9047 ALBUMIN (HUMAN), 25%, 50ML: HCPCS | Mod: JZ,JG | Performed by: GENERAL ACUTE CARE HOSPITAL

## 2024-01-01 PROCEDURE — 37799 UNLISTED PX VASCULAR SURGERY: CPT

## 2024-01-01 PROCEDURE — 99233 SBSQ HOSP IP/OBS HIGH 50: CPT | Mod: 95,,,

## 2024-01-01 PROCEDURE — 84132 ASSAY OF SERUM POTASSIUM: CPT

## 2024-01-01 PROCEDURE — 82728 ASSAY OF FERRITIN: CPT | Performed by: STUDENT IN AN ORGANIZED HEALTH CARE EDUCATION/TRAINING PROGRAM

## 2024-01-01 PROCEDURE — 83735 ASSAY OF MAGNESIUM: CPT | Performed by: HOSPITALIST

## 2024-01-01 PROCEDURE — 36000707: Performed by: STUDENT IN AN ORGANIZED HEALTH CARE EDUCATION/TRAINING PROGRAM

## 2024-01-01 PROCEDURE — 97164 PT RE-EVAL EST PLAN CARE: CPT

## 2024-01-01 PROCEDURE — 99232 SBSQ HOSP IP/OBS MODERATE 35: CPT | Mod: 25,ICN,, | Performed by: STUDENT IN AN ORGANIZED HEALTH CARE EDUCATION/TRAINING PROGRAM

## 2024-01-01 PROCEDURE — 90935 HEMODIALYSIS ONE EVALUATION: CPT | Mod: ,,, | Performed by: INTERNAL MEDICINE

## 2024-01-01 PROCEDURE — 27202073 HC PERITONEAL DIALYSIS SET-UP & CANCEL

## 2024-01-01 PROCEDURE — 36589 REMOVAL TUNNELED CV CATH: CPT | Mod: 59,LT,, | Performed by: INTERNAL MEDICINE

## 2024-01-01 PROCEDURE — C1750 CATH, HEMODIALYSIS,LONG-TERM: HCPCS | Performed by: INTERNAL MEDICINE

## 2024-01-01 PROCEDURE — 99223 1ST HOSP IP/OBS HIGH 75: CPT | Mod: ,,, | Performed by: PHYSICIAN ASSISTANT

## 2024-01-01 PROCEDURE — 87070 CULTURE OTHR SPECIMN AEROBIC: CPT | Performed by: INTERNAL MEDICINE

## 2024-01-01 PROCEDURE — 82728 ASSAY OF FERRITIN: CPT | Performed by: HOSPITALIST

## 2024-01-01 PROCEDURE — 85610 PROTHROMBIN TIME: CPT | Performed by: STUDENT IN AN ORGANIZED HEALTH CARE EDUCATION/TRAINING PROGRAM

## 2024-01-01 PROCEDURE — 99291 CRITICAL CARE FIRST HOUR: CPT | Mod: FS,,, | Performed by: INTERNAL MEDICINE

## 2024-01-01 PROCEDURE — 36600 WITHDRAWAL OF ARTERIAL BLOOD: CPT

## 2024-01-01 PROCEDURE — C1894 INTRO/SHEATH, NON-LASER: HCPCS | Performed by: INTERNAL MEDICINE

## 2024-01-01 PROCEDURE — 85384 FIBRINOGEN ACTIVITY: CPT

## 2024-01-01 PROCEDURE — 85730 THROMBOPLASTIN TIME PARTIAL: CPT | Mod: 91 | Performed by: EMERGENCY MEDICINE

## 2024-01-01 PROCEDURE — 93010 ELECTROCARDIOGRAM REPORT: CPT | Mod: ,,, | Performed by: INTERNAL MEDICINE

## 2024-01-01 PROCEDURE — 80053 COMPREHEN METABOLIC PANEL: CPT | Performed by: HOSPITALIST

## 2024-01-01 PROCEDURE — 87102 FUNGUS ISOLATION CULTURE: CPT | Performed by: STUDENT IN AN ORGANIZED HEALTH CARE EDUCATION/TRAINING PROGRAM

## 2024-01-01 PROCEDURE — 83540 ASSAY OF IRON: CPT | Performed by: HOSPITALIST

## 2024-01-01 PROCEDURE — 90937 HEMODIALYSIS REPEATED EVAL: CPT | Mod: ,,, | Performed by: NURSE PRACTITIONER

## 2024-01-01 PROCEDURE — 37000008 HC ANESTHESIA 1ST 15 MINUTES: Performed by: STUDENT IN AN ORGANIZED HEALTH CARE EDUCATION/TRAINING PROGRAM

## 2024-01-01 PROCEDURE — 99223 1ST HOSP IP/OBS HIGH 75: CPT | Mod: ,,, | Performed by: INTERNAL MEDICINE

## 2024-01-01 PROCEDURE — 84295 ASSAY OF SERUM SODIUM: CPT

## 2024-01-01 PROCEDURE — 85025 COMPLETE CBC W/AUTO DIFF WBC: CPT | Mod: 91

## 2024-01-01 PROCEDURE — 87186 SC STD MICRODIL/AGAR DIL: CPT | Performed by: STUDENT IN AN ORGANIZED HEALTH CARE EDUCATION/TRAINING PROGRAM

## 2024-01-01 PROCEDURE — 85007 BL SMEAR W/DIFF WBC COUNT: CPT | Mod: 91

## 2024-01-01 PROCEDURE — 82140 ASSAY OF AMMONIA: CPT | Performed by: EMERGENCY MEDICINE

## 2024-01-01 PROCEDURE — 06HY33Z INSERTION OF INFUSION DEVICE INTO LOWER VEIN, PERCUTANEOUS APPROACH: ICD-10-PCS | Performed by: INTERNAL MEDICINE

## 2024-01-01 PROCEDURE — 87340 HEPATITIS B SURFACE AG IA: CPT | Performed by: NURSE PRACTITIONER

## 2024-01-01 PROCEDURE — 36620 INSERTION CATHETER ARTERY: CPT

## 2024-01-01 PROCEDURE — 85303 CLOT INHIBIT PROT C ACTIVITY: CPT | Performed by: STUDENT IN AN ORGANIZED HEALTH CARE EDUCATION/TRAINING PROGRAM

## 2024-01-01 PROCEDURE — 87205 SMEAR GRAM STAIN: CPT | Performed by: STUDENT IN AN ORGANIZED HEALTH CARE EDUCATION/TRAINING PROGRAM

## 2024-01-01 PROCEDURE — 85025 COMPLETE CBC W/AUTO DIFF WBC: CPT | Performed by: INTERNAL MEDICINE

## 2024-01-01 PROCEDURE — 85045 AUTOMATED RETICULOCYTE COUNT: CPT

## 2024-01-01 PROCEDURE — 94150 VITAL CAPACITY TEST: CPT

## 2024-01-01 PROCEDURE — 86706 HEP B SURFACE ANTIBODY: CPT | Mod: 91

## 2024-01-01 PROCEDURE — 83540 ASSAY OF IRON: CPT | Performed by: STUDENT IN AN ORGANIZED HEALTH CARE EDUCATION/TRAINING PROGRAM

## 2024-01-01 PROCEDURE — 85014 HEMATOCRIT: CPT

## 2024-01-01 PROCEDURE — 80162 ASSAY OF DIGOXIN TOTAL: CPT

## 2024-01-01 PROCEDURE — 85730 THROMBOPLASTIN TIME PARTIAL: CPT | Mod: 91 | Performed by: HOSPITALIST

## 2024-01-01 PROCEDURE — 86901 BLOOD TYPING SEROLOGIC RH(D): CPT

## 2024-01-01 PROCEDURE — 80053 COMPREHEN METABOLIC PANEL: CPT | Mod: 91 | Performed by: PHYSICIAN ASSISTANT

## 2024-01-01 PROCEDURE — 36556 INSERT NON-TUNNEL CV CATH: CPT | Mod: ,,, | Performed by: NURSE PRACTITIONER

## 2024-01-01 PROCEDURE — 0BH18EZ INSERTION OF ENDOTRACHEAL AIRWAY INTO TRACHEA, VIA NATURAL OR ARTIFICIAL OPENING ENDOSCOPIC: ICD-10-PCS | Performed by: INTERNAL MEDICINE

## 2024-01-01 PROCEDURE — P9047 ALBUMIN (HUMAN), 25%, 50ML: HCPCS | Mod: JZ,JG | Performed by: NURSE PRACTITIONER

## 2024-01-01 PROCEDURE — 37000009 HC ANESTHESIA EA ADD 15 MINS: Performed by: STUDENT IN AN ORGANIZED HEALTH CARE EDUCATION/TRAINING PROGRAM

## 2024-01-01 PROCEDURE — 5A09357 ASSISTANCE WITH RESPIRATORY VENTILATION, LESS THAN 24 CONSECUTIVE HOURS, CONTINUOUS POSITIVE AIRWAY PRESSURE: ICD-10-PCS | Performed by: INTERNAL MEDICINE

## 2024-01-01 PROCEDURE — 99497 ADVNCD CARE PLAN 30 MIN: CPT | Mod: ,,,

## 2024-01-01 PROCEDURE — 87449 NOS EACH ORGANISM AG IA: CPT | Performed by: CLINICAL NURSE SPECIALIST

## 2024-01-01 PROCEDURE — 99291 CRITICAL CARE FIRST HOUR: CPT | Mod: 25,,,

## 2024-01-01 PROCEDURE — 93005 ELECTROCARDIOGRAM TRACING: CPT

## 2024-01-01 PROCEDURE — 87324 CLOSTRIDIUM AG IA: CPT | Performed by: INTERNAL MEDICINE

## 2024-01-01 PROCEDURE — 99223 1ST HOSP IP/OBS HIGH 75: CPT | Mod: GC,,, | Performed by: INTERNAL MEDICINE

## 2024-01-01 PROCEDURE — 80202 ASSAY OF VANCOMYCIN: CPT | Performed by: HOSPITALIST

## 2024-01-01 PROCEDURE — 25000003 PHARM REV CODE 250: Performed by: ANESTHESIOLOGY

## 2024-01-01 PROCEDURE — 77001 FLUOROGUIDE FOR VEIN DEVICE: CPT | Performed by: INTERNAL MEDICINE

## 2024-01-01 DEVICE — 14F X 28CM SPLIT14F X 28CM SPLIT CATH®III CATHETER SET (CUFF 23CM FROM TIP)(CUFF 23CM F
Type: IMPLANTABLE DEVICE | Site: VENA CAVA | Status: FUNCTIONAL
Brand: SPLIT-CATH®III

## 2024-01-01 RX ORDER — INSULIN ASPART 100 [IU]/ML
0-10 INJECTION, SOLUTION INTRAVENOUS; SUBCUTANEOUS
Status: DISCONTINUED | OUTPATIENT
Start: 2024-01-01 | End: 2024-01-01

## 2024-01-01 RX ORDER — CALCIUM GLUCONATE 20 MG/ML
1 INJECTION, SOLUTION INTRAVENOUS ONCE
Status: COMPLETED | OUTPATIENT
Start: 2024-01-01 | End: 2024-01-01

## 2024-01-01 RX ORDER — SODIUM CHLORIDE 9 MG/ML
INJECTION, SOLUTION INTRAVENOUS ONCE
Status: CANCELLED | OUTPATIENT
Start: 2024-01-01 | End: 2024-01-01

## 2024-01-01 RX ORDER — NOREPINEPHRINE BITARTRATE/D5W 4MG/250ML
0-3 PLASTIC BAG, INJECTION (ML) INTRAVENOUS CONTINUOUS
Status: DISCONTINUED | OUTPATIENT
Start: 2024-01-01 | End: 2024-01-01

## 2024-01-01 RX ORDER — CARVEDILOL 25 MG/1
25 TABLET ORAL 2 TIMES DAILY
Status: DISCONTINUED | OUTPATIENT
Start: 2024-01-01 | End: 2024-01-01

## 2024-01-01 RX ORDER — HEPARIN SODIUM,PORCINE/D5W 25000/250
0-40 INTRAVENOUS SOLUTION INTRAVENOUS CONTINUOUS
Status: DISCONTINUED | OUTPATIENT
Start: 2024-01-01 | End: 2024-01-01

## 2024-01-01 RX ORDER — ALBUMIN HUMAN 250 G/1000ML
100 SOLUTION INTRAVENOUS ONCE
Status: COMPLETED | OUTPATIENT
Start: 2024-01-01 | End: 2024-01-01

## 2024-01-01 RX ORDER — HYDROCODONE BITARTRATE AND ACETAMINOPHEN 500; 5 MG/1; MG/1
TABLET ORAL CONTINUOUS
Status: ACTIVE | OUTPATIENT
Start: 2024-01-01 | End: 2024-01-01

## 2024-01-01 RX ORDER — HEPARIN SODIUM 1000 [USP'U]/ML
5000 INJECTION, SOLUTION INTRAVENOUS; SUBCUTANEOUS
OUTPATIENT
Start: 2024-01-01 | End: 2024-01-01

## 2024-01-01 RX ORDER — ONDANSETRON HYDROCHLORIDE 2 MG/ML
4 INJECTION, SOLUTION INTRAVENOUS EVERY 6 HOURS PRN
Status: DISCONTINUED | OUTPATIENT
Start: 2024-01-01 | End: 2024-01-01

## 2024-01-01 RX ORDER — METOPROLOL TARTRATE 1 MG/ML
5 INJECTION, SOLUTION INTRAVENOUS EVERY 5 MIN PRN
Status: COMPLETED | OUTPATIENT
Start: 2024-01-01 | End: 2024-01-01

## 2024-01-01 RX ORDER — ALBUMIN HUMAN 250 G/1000ML
50 SOLUTION INTRAVENOUS ONCE
Status: DISCONTINUED | OUTPATIENT
Start: 2024-01-01 | End: 2024-01-01

## 2024-01-01 RX ORDER — NALOXONE HCL 0.4 MG/ML
0.02 VIAL (ML) INJECTION
Status: DISCONTINUED | OUTPATIENT
Start: 2024-01-01 | End: 2024-01-01 | Stop reason: HOSPADM

## 2024-01-01 RX ORDER — DEXMEDETOMIDINE HYDROCHLORIDE 4 UG/ML
0-1.4 INJECTION, SOLUTION INTRAVENOUS CONTINUOUS
Status: DISCONTINUED | OUTPATIENT
Start: 2024-01-01 | End: 2024-01-01

## 2024-01-01 RX ORDER — GLUCAGON 1 MG
1 KIT INJECTION
OUTPATIENT
Start: 2024-01-01

## 2024-01-01 RX ORDER — IBUPROFEN 200 MG
24 TABLET ORAL
Status: DISCONTINUED | OUTPATIENT
Start: 2024-01-01 | End: 2024-01-01 | Stop reason: HOSPADM

## 2024-01-01 RX ORDER — FENTANYL CITRATE 50 UG/ML
25 INJECTION, SOLUTION INTRAMUSCULAR; INTRAVENOUS ONCE
Status: COMPLETED | OUTPATIENT
Start: 2024-01-01 | End: 2024-01-01

## 2024-01-01 RX ORDER — NOREPINEPHRINE BITARTRATE/D5W 4MG/250ML
0-.2 PLASTIC BAG, INJECTION (ML) INTRAVENOUS CONTINUOUS
Status: DISPENSED | OUTPATIENT
Start: 2024-01-01 | End: 2024-01-01

## 2024-01-01 RX ORDER — HYDROCODONE BITARTRATE AND ACETAMINOPHEN 5; 325 MG/1; MG/1
1 TABLET ORAL EVERY 6 HOURS PRN
Status: DISCONTINUED | OUTPATIENT
Start: 2024-01-01 | End: 2024-01-01

## 2024-01-01 RX ORDER — DIGOXIN 0.25 MG/ML
125 INJECTION INTRAMUSCULAR; INTRAVENOUS ONCE AS NEEDED
Status: DISCONTINUED | OUTPATIENT
Start: 2024-01-01 | End: 2024-01-01

## 2024-01-01 RX ORDER — DIPHENHYDRAMINE HCL 25 MG
25 CAPSULE ORAL EVERY 6 HOURS PRN
Status: DISCONTINUED | OUTPATIENT
Start: 2024-01-01 | End: 2024-01-01

## 2024-01-01 RX ORDER — OLANZAPINE 10 MG/2ML
2.5 INJECTION, POWDER, FOR SOLUTION INTRAMUSCULAR ONCE AS NEEDED
Status: COMPLETED | OUTPATIENT
Start: 2024-01-01 | End: 2024-01-01

## 2024-01-01 RX ORDER — HEPARIN SODIUM 10000 [USP'U]/100ML
500 INJECTION, SOLUTION INTRAVENOUS CONTINUOUS
Status: DISPENSED | OUTPATIENT
Start: 2024-01-01 | End: 2024-01-01

## 2024-01-01 RX ORDER — METOPROLOL TARTRATE 1 MG/ML
5 INJECTION, SOLUTION INTRAVENOUS EVERY 6 HOURS PRN
Status: DISCONTINUED | OUTPATIENT
Start: 2024-01-01 | End: 2024-01-01

## 2024-01-01 RX ORDER — LOPERAMIDE HYDROCHLORIDE 2 MG/1
2 CAPSULE ORAL 4 TIMES DAILY PRN
Status: DISCONTINUED | OUTPATIENT
Start: 2024-01-01 | End: 2024-01-01

## 2024-01-01 RX ORDER — MAGNESIUM SULFATE HEPTAHYDRATE 40 MG/ML
2 INJECTION, SOLUTION INTRAVENOUS
Status: DISPENSED | OUTPATIENT
Start: 2024-01-01 | End: 2024-01-01

## 2024-01-01 RX ORDER — MAGNESIUM SULFATE 1 G/100ML
1 INJECTION INTRAVENOUS ONCE
Status: COMPLETED | OUTPATIENT
Start: 2024-01-01 | End: 2024-01-01

## 2024-01-01 RX ORDER — HYDROCODONE BITARTRATE AND ACETAMINOPHEN 500; 5 MG/1; MG/1
TABLET ORAL
Status: DISCONTINUED | OUTPATIENT
Start: 2024-01-01 | End: 2024-01-01

## 2024-01-01 RX ORDER — METOPROLOL TARTRATE 25 MG/1
25 TABLET, FILM COATED ORAL 2 TIMES DAILY
Status: DISCONTINUED | OUTPATIENT
Start: 2024-01-01 | End: 2024-01-01

## 2024-01-01 RX ORDER — MAGNESIUM SULFATE HEPTAHYDRATE 40 MG/ML
2 INJECTION, SOLUTION INTRAVENOUS
Status: CANCELLED | OUTPATIENT
Start: 2024-01-01 | End: 2024-01-01

## 2024-01-01 RX ORDER — AMIODARONE HYDROCHLORIDE 200 MG/1
200 TABLET ORAL DAILY
Status: DISCONTINUED | OUTPATIENT
Start: 2024-01-01 | End: 2024-01-01

## 2024-01-01 RX ORDER — DIPHENHYDRAMINE HYDROCHLORIDE 50 MG/ML
12.5 INJECTION INTRAMUSCULAR; INTRAVENOUS EVERY 6 HOURS PRN
Status: DISCONTINUED | OUTPATIENT
Start: 2024-01-01 | End: 2024-01-01 | Stop reason: HOSPADM

## 2024-01-01 RX ORDER — SODIUM CHLORIDE 0.9 % (FLUSH) 0.9 %
10 SYRINGE (ML) INJECTION
Status: DISCONTINUED | OUTPATIENT
Start: 2024-01-01 | End: 2024-01-01 | Stop reason: HOSPADM

## 2024-01-01 RX ORDER — FAMOTIDINE 20 MG/1
20 TABLET, FILM COATED ORAL DAILY
Status: DISCONTINUED | OUTPATIENT
Start: 2024-01-01 | End: 2024-01-01

## 2024-01-01 RX ORDER — MORPHINE SULFATE 2 MG/ML
4 INJECTION, SOLUTION INTRAMUSCULAR; INTRAVENOUS
Status: DISCONTINUED | OUTPATIENT
Start: 2024-01-01 | End: 2024-01-01 | Stop reason: HOSPADM

## 2024-01-01 RX ORDER — DIPHENHYDRAMINE HYDROCHLORIDE 50 MG/ML
12.5 INJECTION INTRAMUSCULAR; INTRAVENOUS ONCE
Status: DISCONTINUED | OUTPATIENT
Start: 2024-01-01 | End: 2024-01-01

## 2024-01-01 RX ORDER — HEPARIN SODIUM 5000 [USP'U]/ML
5000 INJECTION, SOLUTION INTRAVENOUS; SUBCUTANEOUS EVERY 8 HOURS
Status: DISCONTINUED | OUTPATIENT
Start: 2024-01-01 | End: 2024-01-01

## 2024-01-01 RX ORDER — ACETAMINOPHEN 325 MG/1
325 TABLET ORAL EVERY 6 HOURS PRN
Status: DISCONTINUED | OUTPATIENT
Start: 2024-01-01 | End: 2024-01-01

## 2024-01-01 RX ORDER — HYDROCODONE BITARTRATE AND ACETAMINOPHEN 500; 5 MG/1; MG/1
TABLET ORAL
Status: DISCONTINUED | OUTPATIENT
Start: 2024-01-01 | End: 2024-01-01 | Stop reason: HOSPADM

## 2024-01-01 RX ORDER — METOPROLOL TARTRATE 1 MG/ML
2.5 INJECTION, SOLUTION INTRAVENOUS EVERY 5 MIN PRN
Status: CANCELLED | OUTPATIENT
Start: 2024-01-01

## 2024-01-01 RX ORDER — LORAZEPAM 2 MG/ML
1 INJECTION INTRAMUSCULAR EVERY 4 HOURS PRN
Status: DISCONTINUED | OUTPATIENT
Start: 2024-01-01 | End: 2024-01-01 | Stop reason: HOSPADM

## 2024-01-01 RX ORDER — FLUCONAZOLE 100 MG/1
200 TABLET ORAL EVERY OTHER DAY
Status: DISCONTINUED | OUTPATIENT
Start: 2024-01-01 | End: 2024-01-01

## 2024-01-01 RX ORDER — DEXMEDETOMIDINE HYDROCHLORIDE 4 UG/ML
0-1.4 INJECTION, SOLUTION INTRAVENOUS CONTINUOUS
Status: DISCONTINUED | OUTPATIENT
Start: 2024-01-01 | End: 2024-01-01 | Stop reason: HOSPADM

## 2024-01-01 RX ORDER — DIPHENHYDRAMINE HYDROCHLORIDE 50 MG/ML
12.5 INJECTION INTRAMUSCULAR; INTRAVENOUS ONCE AS NEEDED
Status: DISCONTINUED | OUTPATIENT
Start: 2024-01-01 | End: 2024-01-01

## 2024-01-01 RX ORDER — SODIUM CHLORIDE 0.9 % (FLUSH) 0.9 %
10 SYRINGE (ML) INJECTION
Status: DISCONTINUED | OUTPATIENT
Start: 2024-01-01 | End: 2024-01-01

## 2024-01-01 RX ORDER — SODIUM CHLORIDE 9 MG/ML
INJECTION, SOLUTION INTRAVENOUS
Status: CANCELLED | OUTPATIENT
Start: 2024-01-01

## 2024-01-01 RX ORDER — CHOLESTYRAMINE 4 G/9G
1 POWDER, FOR SUSPENSION ORAL 2 TIMES DAILY
Status: DISCONTINUED | OUTPATIENT
Start: 2024-01-01 | End: 2024-01-01

## 2024-01-01 RX ORDER — FAMOTIDINE 10 MG/ML
20 INJECTION INTRAVENOUS DAILY
Status: DISCONTINUED | OUTPATIENT
Start: 2024-01-01 | End: 2024-01-01

## 2024-01-01 RX ORDER — OLANZAPINE 10 MG/2ML
5 INJECTION, POWDER, FOR SOLUTION INTRAMUSCULAR ONCE
Status: COMPLETED | OUTPATIENT
Start: 2024-01-01 | End: 2024-01-01

## 2024-01-01 RX ORDER — FENTANYL CITRATE 50 UG/ML
25 INJECTION, SOLUTION INTRAMUSCULAR; INTRAVENOUS EVERY 5 MIN PRN
Status: DISCONTINUED | OUTPATIENT
Start: 2024-01-01 | End: 2024-01-01

## 2024-01-01 RX ORDER — ACETAMINOPHEN 500 MG
1000 TABLET ORAL ONCE
Status: COMPLETED | OUTPATIENT
Start: 2024-01-01 | End: 2024-01-01

## 2024-01-01 RX ORDER — ACETAMINOPHEN 325 MG/1
650 TABLET ORAL EVERY 6 HOURS PRN
Status: DISCONTINUED | OUTPATIENT
Start: 2024-01-01 | End: 2024-01-01

## 2024-01-01 RX ORDER — HEPARIN SODIUM 1000 [USP'U]/ML
1000 INJECTION, SOLUTION INTRAVENOUS; SUBCUTANEOUS
Status: DISCONTINUED | OUTPATIENT
Start: 2024-01-01 | End: 2024-01-01

## 2024-01-01 RX ORDER — MORPHINE SULFATE 2 MG/ML
2 INJECTION, SOLUTION INTRAMUSCULAR; INTRAVENOUS EVERY 4 HOURS PRN
Status: DISCONTINUED | OUTPATIENT
Start: 2024-01-01 | End: 2024-01-01 | Stop reason: HOSPADM

## 2024-01-01 RX ORDER — HYDROCODONE BITARTRATE AND ACETAMINOPHEN 500; 5 MG/1; MG/1
TABLET ORAL CONTINUOUS
Status: DISCONTINUED | OUTPATIENT
Start: 2024-01-01 | End: 2024-01-01

## 2024-01-01 RX ORDER — DIGOXIN 0.25 MG/ML
250 INJECTION INTRAMUSCULAR; INTRAVENOUS ONCE
Status: COMPLETED | OUTPATIENT
Start: 2024-01-01 | End: 2024-01-01

## 2024-01-01 RX ORDER — METOPROLOL TARTRATE 1 MG/ML
5 INJECTION, SOLUTION INTRAVENOUS EVERY 6 HOURS PRN
Status: DISCONTINUED | OUTPATIENT
Start: 2024-01-01 | End: 2024-01-01 | Stop reason: HOSPADM

## 2024-01-01 RX ORDER — METOPROLOL TARTRATE 25 MG/1
25 TABLET, FILM COATED ORAL 3 TIMES DAILY
Status: DISCONTINUED | OUTPATIENT
Start: 2024-01-01 | End: 2024-01-01

## 2024-01-01 RX ORDER — METOPROLOL TARTRATE 1 MG/ML
5 INJECTION, SOLUTION INTRAVENOUS ONCE
Status: COMPLETED | OUTPATIENT
Start: 2024-01-01 | End: 2024-01-01

## 2024-01-01 RX ORDER — EPINEPHRINE 1 MG/ML
INJECTION, SOLUTION, CONCENTRATE INTRAVENOUS
Status: COMPLETED
Start: 2024-01-01 | End: 2024-01-01

## 2024-01-01 RX ORDER — ALUMINUM HYDROXIDE, MAGNESIUM HYDROXIDE, AND SIMETHICONE 1200; 120; 1200 MG/30ML; MG/30ML; MG/30ML
30 SUSPENSION ORAL EVERY 6 HOURS PRN
Status: DISCONTINUED | OUTPATIENT
Start: 2024-01-01 | End: 2024-01-01

## 2024-01-01 RX ORDER — CARVEDILOL 12.5 MG/1
12.5 TABLET ORAL 2 TIMES DAILY
Status: DISCONTINUED | OUTPATIENT
Start: 2024-01-01 | End: 2024-01-01

## 2024-01-01 RX ORDER — ASPIRIN 81 MG/1
81 TABLET ORAL DAILY
Status: DISCONTINUED | OUTPATIENT
Start: 2024-01-01 | End: 2024-01-01

## 2024-01-01 RX ORDER — GENTAMICIN SULFATE 1 MG/G
OINTMENT TOPICAL 3 TIMES DAILY
Status: DISCONTINUED | OUTPATIENT
Start: 2024-01-01 | End: 2024-01-01

## 2024-01-01 RX ORDER — LIDOCAINE 50 MG/G
2 PATCH TOPICAL
Status: DISCONTINUED | OUTPATIENT
Start: 2024-01-01 | End: 2024-01-01

## 2024-01-01 RX ORDER — SODIUM CHLORIDE 9 MG/ML
INJECTION, SOLUTION INTRAVENOUS ONCE
Status: COMPLETED | OUTPATIENT
Start: 2024-01-01 | End: 2024-01-01

## 2024-01-01 RX ORDER — METOPROLOL SUCCINATE 50 MG/1
50 TABLET, EXTENDED RELEASE ORAL DAILY
Status: DISCONTINUED | OUTPATIENT
Start: 2024-01-01 | End: 2024-01-01

## 2024-01-01 RX ORDER — MAGNESIUM SULFATE HEPTAHYDRATE 40 MG/ML
2 INJECTION, SOLUTION INTRAVENOUS
Status: ACTIVE | OUTPATIENT
Start: 2024-01-01 | End: 2024-01-01

## 2024-01-01 RX ORDER — GLYCOPYRROLATE 0.2 MG/ML
0.1 INJECTION INTRAMUSCULAR; INTRAVENOUS 3 TIMES DAILY PRN
Status: DISCONTINUED | OUTPATIENT
Start: 2024-01-01 | End: 2024-01-01 | Stop reason: HOSPADM

## 2024-01-01 RX ORDER — ONDANSETRON HYDROCHLORIDE 2 MG/ML
8 INJECTION, SOLUTION INTRAVENOUS EVERY 8 HOURS PRN
Status: DISCONTINUED | OUTPATIENT
Start: 2024-01-01 | End: 2024-01-01 | Stop reason: HOSPADM

## 2024-01-01 RX ORDER — FLUDROCORTISONE ACETATE 0.1 MG/1
100 TABLET ORAL DAILY
Status: DISCONTINUED | OUTPATIENT
Start: 2024-01-01 | End: 2024-01-01 | Stop reason: HOSPADM

## 2024-01-01 RX ORDER — ARGATROBAN 1 MG/ML
0-10 INJECTION INTRAVENOUS CONTINUOUS
Status: DISCONTINUED | OUTPATIENT
Start: 2024-01-01 | End: 2024-01-01

## 2024-01-01 RX ORDER — LIDOCAINE HYDROCHLORIDE 10 MG/ML
10 INJECTION, SOLUTION INFILTRATION; PERINEURAL ONCE
Status: COMPLETED | OUTPATIENT
Start: 2024-01-01 | End: 2024-01-01

## 2024-01-01 RX ORDER — HEPARIN SODIUM 1000 [USP'U]/ML
1000 INJECTION, SOLUTION INTRAVENOUS; SUBCUTANEOUS ONCE
Status: COMPLETED | OUTPATIENT
Start: 2024-01-01 | End: 2024-01-01

## 2024-01-01 RX ORDER — SODIUM CHLORIDE 9 MG/ML
INJECTION, SOLUTION INTRAVENOUS ONCE
Status: CANCELLED | OUTPATIENT
Start: 2024-01-01

## 2024-01-01 RX ORDER — HALOPERIDOL 5 MG/ML
0.5 INJECTION INTRAMUSCULAR EVERY 10 MIN PRN
Status: CANCELLED | OUTPATIENT
Start: 2024-01-01

## 2024-01-01 RX ORDER — SIMETHICONE 80 MG
1 TABLET,CHEWABLE ORAL 3 TIMES DAILY PRN
Status: DISCONTINUED | OUTPATIENT
Start: 2024-01-01 | End: 2024-01-01 | Stop reason: HOSPADM

## 2024-01-01 RX ORDER — BACLOFEN 5 MG/1
5 TABLET ORAL EVERY 8 HOURS PRN
Status: DISCONTINUED | OUTPATIENT
Start: 2024-01-01 | End: 2024-01-01 | Stop reason: HOSPADM

## 2024-01-01 RX ORDER — ACETAMINOPHEN 10 MG/ML
1000 INJECTION, SOLUTION INTRAVENOUS ONCE
Status: COMPLETED | OUTPATIENT
Start: 2024-01-01 | End: 2024-01-01

## 2024-01-01 RX ORDER — MIDODRINE HYDROCHLORIDE 5 MG/1
5 TABLET ORAL 3 TIMES DAILY
Status: DISCONTINUED | OUTPATIENT
Start: 2024-01-01 | End: 2024-01-01

## 2024-01-01 RX ORDER — HEPARIN SODIUM 1000 [USP'U]/ML
5000 INJECTION, SOLUTION INTRAVENOUS; SUBCUTANEOUS
Status: DISCONTINUED | OUTPATIENT
Start: 2024-01-01 | End: 2024-01-01

## 2024-01-01 RX ORDER — SODIUM CHLORIDE 9 MG/ML
INJECTION, SOLUTION INTRAVENOUS CONTINUOUS
Status: CANCELLED | OUTPATIENT
Start: 2024-01-01 | End: 2024-01-01

## 2024-01-01 RX ORDER — NOREPINEPHRINE BITARTRATE/D5W 4MG/250ML
PLASTIC BAG, INJECTION (ML) INTRAVENOUS
Status: COMPLETED
Start: 2024-01-01 | End: 2024-01-01

## 2024-01-01 RX ORDER — HYDROCODONE BITARTRATE AND ACETAMINOPHEN 10; 325 MG/1; MG/1
1 TABLET ORAL EVERY 6 HOURS PRN
Status: DISCONTINUED | OUTPATIENT
Start: 2024-01-01 | End: 2024-01-01 | Stop reason: HOSPADM

## 2024-01-01 RX ORDER — HYDROCODONE BITARTRATE AND ACETAMINOPHEN 500; 5 MG/1; MG/1
TABLET ORAL CONTINUOUS
Status: CANCELLED | OUTPATIENT
Start: 2024-01-01 | End: 2024-01-01

## 2024-01-01 RX ORDER — MAGNESIUM SULFATE HEPTAHYDRATE 40 MG/ML
2 INJECTION, SOLUTION INTRAVENOUS
Status: DISCONTINUED | OUTPATIENT
Start: 2024-01-01 | End: 2024-01-01

## 2024-01-01 RX ORDER — ALBUTEROL SULFATE 2.5 MG/.5ML
2.5 SOLUTION RESPIRATORY (INHALATION) EVERY 4 HOURS PRN
Status: DISCONTINUED | OUTPATIENT
Start: 2024-01-01 | End: 2024-01-01

## 2024-01-01 RX ORDER — LIDOCAINE HYDROCHLORIDE 10 MG/ML
INJECTION, SOLUTION EPIDURAL; INFILTRATION; INTRACAUDAL; PERINEURAL
Status: COMPLETED
Start: 2024-01-01 | End: 2024-01-01

## 2024-01-01 RX ORDER — DIGOXIN 0.25 MG/ML
500 INJECTION INTRAMUSCULAR; INTRAVENOUS ONCE
Status: COMPLETED | OUTPATIENT
Start: 2024-01-01 | End: 2024-01-01

## 2024-01-01 RX ORDER — SODIUM CHLORIDE 0.9 % (FLUSH) 0.9 %
10 SYRINGE (ML) INJECTION
OUTPATIENT
Start: 2024-01-01

## 2024-01-01 RX ORDER — NOREPINEPHRINE BITARTRATE/D5W 4MG/250ML
0-.2 PLASTIC BAG, INJECTION (ML) INTRAVENOUS CONTINUOUS
Status: DISCONTINUED | OUTPATIENT
Start: 2024-01-01 | End: 2024-01-01

## 2024-01-01 RX ORDER — NOREPINEPHRINE BITARTRATE/D5W 4MG/250ML
0-.2 PLASTIC BAG, INJECTION (ML) INTRAVENOUS CONTINUOUS
Status: DISCONTINUED | OUTPATIENT
Start: 2024-01-01 | End: 2024-01-01 | Stop reason: HOSPADM

## 2024-01-01 RX ORDER — DILTIAZEM HCL/D5W 125 MG/125
5 PLASTIC BAG, INJECTION (ML) INTRAVENOUS CONTINUOUS
Status: DISCONTINUED | OUTPATIENT
Start: 2024-01-01 | End: 2024-01-01

## 2024-01-01 RX ORDER — GLUCAGON 1 MG
1 KIT INJECTION
Status: DISCONTINUED | OUTPATIENT
Start: 2024-01-01 | End: 2024-01-01 | Stop reason: HOSPADM

## 2024-01-01 RX ORDER — HEPARIN SODIUM 5000 [USP'U]/ML
7500 INJECTION, SOLUTION INTRAVENOUS; SUBCUTANEOUS EVERY 8 HOURS
Status: DISCONTINUED | OUTPATIENT
Start: 2024-01-01 | End: 2024-01-01

## 2024-01-01 RX ORDER — FLUDROCORTISONE ACETATE 0.1 MG/1
100 TABLET ORAL DAILY
Status: DISCONTINUED | OUTPATIENT
Start: 2024-01-01 | End: 2024-01-01

## 2024-01-01 RX ORDER — PANTOPRAZOLE SODIUM 40 MG/1
40 TABLET, DELAYED RELEASE ORAL DAILY
Status: DISCONTINUED | OUTPATIENT
Start: 2024-01-01 | End: 2024-01-01

## 2024-01-01 RX ORDER — PROMETHAZINE HYDROCHLORIDE 25 MG/1
25 SUPPOSITORY RECTAL EVERY 6 HOURS PRN
Status: DISCONTINUED | OUTPATIENT
Start: 2024-01-01 | End: 2024-01-01 | Stop reason: HOSPADM

## 2024-01-01 RX ORDER — SODIUM CHLORIDE 9 MG/ML
INJECTION, SOLUTION INTRAVENOUS CONTINUOUS
OUTPATIENT
Start: 2024-01-01 | End: 2024-01-01

## 2024-01-01 RX ORDER — SODIUM BICARBONATE 650 MG/1
1300 TABLET ORAL ONCE
Status: COMPLETED | OUTPATIENT
Start: 2024-01-01 | End: 2024-01-01

## 2024-01-01 RX ORDER — MIDODRINE HYDROCHLORIDE 5 MG/1
10 TABLET ORAL 3 TIMES DAILY
Status: DISCONTINUED | OUTPATIENT
Start: 2024-01-01 | End: 2024-01-01

## 2024-01-01 RX ORDER — HYDROMORPHONE HYDROCHLORIDE 1 MG/ML
0.5 INJECTION, SOLUTION INTRAMUSCULAR; INTRAVENOUS; SUBCUTANEOUS
Status: DISCONTINUED | OUTPATIENT
Start: 2024-01-01 | End: 2024-01-01 | Stop reason: HOSPADM

## 2024-01-01 RX ORDER — AMIODARONE HYDROCHLORIDE 200 MG/1
400 TABLET ORAL 2 TIMES DAILY
Status: DISCONTINUED | OUTPATIENT
Start: 2024-01-01 | End: 2024-01-01

## 2024-01-01 RX ORDER — HEPARIN SODIUM 10000 [USP'U]/100ML
250 INJECTION, SOLUTION INTRAVENOUS CONTINUOUS
Status: DISCONTINUED | OUTPATIENT
Start: 2024-01-01 | End: 2024-01-01

## 2024-01-01 RX ORDER — GLUCAGON 1 MG
1 KIT INJECTION
Status: CANCELLED | OUTPATIENT
Start: 2024-01-01

## 2024-01-01 RX ORDER — BUPIVACAINE HYDROCHLORIDE 2.5 MG/ML
INJECTION, SOLUTION EPIDURAL; INFILTRATION; INTRACAUDAL
Status: DISCONTINUED | OUTPATIENT
Start: 2024-01-01 | End: 2024-01-01 | Stop reason: HOSPADM

## 2024-01-01 RX ORDER — FUROSEMIDE 10 MG/ML
40 INJECTION INTRAMUSCULAR; INTRAVENOUS EVERY 12 HOURS
Status: DISCONTINUED | OUTPATIENT
Start: 2024-01-01 | End: 2024-01-01

## 2024-01-01 RX ORDER — ALBUMIN HUMAN 250 G/1000ML
25 SOLUTION INTRAVENOUS EVERY 8 HOURS
Status: DISPENSED | OUTPATIENT
Start: 2024-01-01 | End: 2024-01-01

## 2024-01-01 RX ORDER — MIDAZOLAM HYDROCHLORIDE 1 MG/ML
2 INJECTION, SOLUTION INTRAMUSCULAR; INTRAVENOUS ONCE
Status: COMPLETED | OUTPATIENT
Start: 2024-01-01 | End: 2024-01-01

## 2024-01-01 RX ORDER — HYDROXYZINE PAMOATE 25 MG/1
25 CAPSULE ORAL EVERY 8 HOURS PRN
Status: DISCONTINUED | OUTPATIENT
Start: 2024-01-01 | End: 2024-01-01 | Stop reason: HOSPADM

## 2024-01-01 RX ORDER — HYDROCODONE BITARTRATE AND ACETAMINOPHEN 500; 5 MG/1; MG/1
TABLET ORAL CONTINUOUS
Status: DISCONTINUED | OUTPATIENT
Start: 2024-01-01 | End: 2024-01-01 | Stop reason: HOSPADM

## 2024-01-01 RX ORDER — MAGNESIUM SULFATE HEPTAHYDRATE 40 MG/ML
2 INJECTION, SOLUTION INTRAVENOUS
Status: DISCONTINUED | OUTPATIENT
Start: 2024-01-01 | End: 2024-01-01 | Stop reason: HOSPADM

## 2024-01-01 RX ORDER — SODIUM CHLORIDE 0.9 % (FLUSH) 0.9 %
3 SYRINGE (ML) INJECTION
Status: CANCELLED | OUTPATIENT
Start: 2024-01-01

## 2024-01-01 RX ORDER — DEXTROSE MONOHYDRATE 100 MG/ML
INJECTION, SOLUTION INTRAVENOUS CONTINUOUS
Status: DISCONTINUED | OUTPATIENT
Start: 2024-01-01 | End: 2024-01-01

## 2024-01-01 RX ORDER — HYDROMORPHONE HYDROCHLORIDE 1 MG/ML
0.2 INJECTION, SOLUTION INTRAMUSCULAR; INTRAVENOUS; SUBCUTANEOUS EVERY 5 MIN PRN
Status: CANCELLED | OUTPATIENT
Start: 2024-01-01

## 2024-01-01 RX ORDER — PROPOFOL 10 MG/ML
0-50 INJECTION, EMULSION INTRAVENOUS CONTINUOUS
Status: DISCONTINUED | OUTPATIENT
Start: 2024-01-01 | End: 2024-01-01

## 2024-01-01 RX ORDER — TRAMADOL HYDROCHLORIDE 50 MG/1
50 TABLET ORAL EVERY 8 HOURS PRN
Status: DISCONTINUED | OUTPATIENT
Start: 2024-01-01 | End: 2024-01-01

## 2024-01-01 RX ORDER — LIDOCAINE 50 MG/G
1 PATCH TOPICAL
Status: DISCONTINUED | OUTPATIENT
Start: 2024-01-01 | End: 2024-01-01

## 2024-01-01 RX ORDER — METOPROLOL TARTRATE 50 MG/1
50 TABLET ORAL 2 TIMES DAILY
Status: DISCONTINUED | OUTPATIENT
Start: 2024-01-01 | End: 2024-01-01

## 2024-01-01 RX ORDER — DIGOXIN 0.25 MG/ML
125 INJECTION INTRAMUSCULAR; INTRAVENOUS EVERY 6 HOURS
Status: COMPLETED | OUTPATIENT
Start: 2024-01-01 | End: 2024-01-01

## 2024-01-01 RX ORDER — MICONAZOLE NITRATE 2 G/100G
POWDER TOPICAL 2 TIMES DAILY
Status: DISCONTINUED | OUTPATIENT
Start: 2024-01-01 | End: 2024-01-01

## 2024-01-01 RX ORDER — SEVELAMER CARBONATE FOR ORAL SUSPENSION 800 MG/1
0.8 POWDER, FOR SUSPENSION ORAL
Status: DISCONTINUED | OUTPATIENT
Start: 2024-01-01 | End: 2024-01-01

## 2024-01-01 RX ORDER — ONDANSETRON HYDROCHLORIDE 2 MG/ML
4 INJECTION, SOLUTION INTRAVENOUS DAILY PRN
Status: CANCELLED | OUTPATIENT
Start: 2024-01-01

## 2024-01-01 RX ORDER — ACETAMINOPHEN 500 MG
1000 TABLET ORAL EVERY 8 HOURS PRN
Status: DISCONTINUED | OUTPATIENT
Start: 2024-01-01 | End: 2024-01-01 | Stop reason: HOSPADM

## 2024-01-01 RX ORDER — METOPROLOL TARTRATE 25 MG/1
12.5 TABLET ORAL 2 TIMES DAILY
Status: DISCONTINUED | OUTPATIENT
Start: 2024-01-01 | End: 2024-01-01

## 2024-01-01 RX ORDER — SODIUM CHLORIDE 0.9 % (FLUSH) 0.9 %
10 SYRINGE (ML) INJECTION EVERY 12 HOURS PRN
Status: DISCONTINUED | OUTPATIENT
Start: 2024-01-01 | End: 2024-01-01

## 2024-01-01 RX ORDER — MUPIROCIN 20 MG/G
OINTMENT TOPICAL 2 TIMES DAILY
Status: DISPENSED | OUTPATIENT
Start: 2024-01-01 | End: 2024-01-01

## 2024-01-01 RX ORDER — LANOLIN ALCOHOL/MO/W.PET/CERES
1 CREAM (GRAM) TOPICAL DAILY
Status: DISCONTINUED | OUTPATIENT
Start: 2024-01-01 | End: 2024-01-01

## 2024-01-01 RX ORDER — DEXMEDETOMIDINE HYDROCHLORIDE 4 UG/ML
INJECTION, SOLUTION INTRAVENOUS
Status: DISPENSED
Start: 2024-01-01 | End: 2024-01-01

## 2024-01-01 RX ORDER — DIPHENHYDRAMINE HCL 25 MG
25 CAPSULE ORAL EVERY 6 HOURS PRN
Status: CANCELLED | OUTPATIENT
Start: 2024-01-01

## 2024-01-01 RX ORDER — LIDOCAINE HYDROCHLORIDE AND EPINEPHRINE 10; 10 MG/ML; UG/ML
INJECTION, SOLUTION INFILTRATION; PERINEURAL
Status: DISCONTINUED | OUTPATIENT
Start: 2024-01-01 | End: 2024-01-01 | Stop reason: HOSPADM

## 2024-01-01 RX ORDER — ALBUMIN HUMAN 250 G/1000ML
50 SOLUTION INTRAVENOUS ONCE
Status: COMPLETED | OUTPATIENT
Start: 2024-01-01 | End: 2024-01-01

## 2024-01-01 RX ORDER — LORAZEPAM 2 MG/ML
0.5 INJECTION INTRAMUSCULAR EVERY 30 MIN PRN
Status: DISCONTINUED | OUTPATIENT
Start: 2024-01-01 | End: 2024-01-01 | Stop reason: HOSPADM

## 2024-01-01 RX ORDER — METOPROLOL TARTRATE 50 MG/1
50 TABLET ORAL 3 TIMES DAILY
Status: DISCONTINUED | OUTPATIENT
Start: 2024-01-01 | End: 2024-01-01

## 2024-01-01 RX ORDER — GUAIFENESIN AND DEXTROMETHORPHAN HYDROBROMIDE 10; 100 MG/5ML; MG/5ML
5 SYRUP ORAL EVERY 4 HOURS PRN
Status: DISCONTINUED | OUTPATIENT
Start: 2024-01-01 | End: 2024-01-01 | Stop reason: HOSPADM

## 2024-01-01 RX ORDER — HYDROCODONE BITARTRATE AND ACETAMINOPHEN 5; 325 MG/1; MG/1
1 TABLET ORAL EVERY 8 HOURS PRN
Status: DISCONTINUED | OUTPATIENT
Start: 2024-01-01 | End: 2024-01-01

## 2024-01-01 RX ORDER — TALC
6 POWDER (GRAM) TOPICAL ONCE
Status: COMPLETED | OUTPATIENT
Start: 2024-01-01 | End: 2024-01-01

## 2024-01-01 RX ORDER — IBUPROFEN 200 MG
16 TABLET ORAL
Status: DISCONTINUED | OUTPATIENT
Start: 2024-01-01 | End: 2024-01-01 | Stop reason: HOSPADM

## 2024-01-01 RX ORDER — HYOSCYAMINE SULFATE 0.12 MG/1
0.12 TABLET SUBLINGUAL EVERY 4 HOURS PRN
Status: DISCONTINUED | OUTPATIENT
Start: 2024-01-01 | End: 2024-01-01 | Stop reason: HOSPADM

## 2024-01-01 RX ORDER — HEPARIN SODIUM 1000 [USP'U]/ML
3000 INJECTION, SOLUTION INTRAVENOUS; SUBCUTANEOUS DAILY PRN
Status: DISCONTINUED | OUTPATIENT
Start: 2024-01-01 | End: 2024-01-01

## 2024-01-01 RX ORDER — MIDODRINE HYDROCHLORIDE 5 MG/1
10 TABLET ORAL DAILY PRN
Status: DISCONTINUED | OUTPATIENT
Start: 2024-01-01 | End: 2024-01-01 | Stop reason: HOSPADM

## 2024-01-01 RX ORDER — HEPARIN SODIUM 1000 [USP'U]/ML
500 INJECTION, SOLUTION INTRAVENOUS; SUBCUTANEOUS ONCE
Status: DISCONTINUED | OUTPATIENT
Start: 2024-01-01 | End: 2024-01-01

## 2024-01-01 RX ADMIN — GENTAMICIN SULFATE: 1 OINTMENT TOPICAL at 09:07

## 2024-01-01 RX ADMIN — VANCOMYCIN HYDROCHLORIDE 125 MG: KIT at 12:07

## 2024-01-01 RX ADMIN — METOPROLOL TARTRATE 25 MG: 25 TABLET, FILM COATED ORAL at 02:07

## 2024-01-01 RX ADMIN — NOREPINEPHRINE BITARTRATE 0.08 MCG/KG/MIN: 4 INJECTION, SOLUTION INTRAVENOUS at 10:07

## 2024-01-01 RX ADMIN — SODIUM CHLORIDE 125 MG: 9 INJECTION, SOLUTION INTRAVENOUS at 01:07

## 2024-01-01 RX ADMIN — FIDAXOMICIN 200 MG: 200 TABLET, FILM COATED ORAL at 09:07

## 2024-01-01 RX ADMIN — PIPERACILLIN SODIUM AND TAZOBACTAM SODIUM 4.5 G: 4; .5 INJECTION, POWDER, FOR SOLUTION INTRAVENOUS at 01:08

## 2024-01-01 RX ADMIN — MIDODRINE HYDROCHLORIDE 10 MG: 5 TABLET ORAL at 09:07

## 2024-01-01 RX ADMIN — MIDAZOLAM 2 MG: 1 INJECTION INTRAMUSCULAR; INTRAVENOUS at 09:07

## 2024-01-01 RX ADMIN — CHOLESTYRAMINE 4 G: 4 POWDER, FOR SUSPENSION ORAL at 10:07

## 2024-01-01 RX ADMIN — MIDODRINE HYDROCHLORIDE 5 MG: 5 TABLET ORAL at 10:07

## 2024-01-01 RX ADMIN — VANCOMYCIN HYDROCHLORIDE 125 MG: KIT at 05:07

## 2024-01-01 RX ADMIN — MICONAZOLE NITRATE 2 % TOPICAL POWDER: at 08:07

## 2024-01-01 RX ADMIN — VANCOMYCIN HYDROCHLORIDE 2000 MG: 500 INJECTION, POWDER, LYOPHILIZED, FOR SOLUTION INTRAVENOUS at 12:08

## 2024-01-01 RX ADMIN — METOPROLOL TARTRATE 50 MG: 50 TABLET, FILM COATED ORAL at 09:07

## 2024-01-01 RX ADMIN — HEPARIN SODIUM AND DEXTROSE 14 UNITS/KG/HR: 10000; 5 INJECTION INTRAVENOUS at 07:07

## 2024-01-01 RX ADMIN — METOPROLOL TARTRATE 75 MG: 50 TABLET, FILM COATED ORAL at 08:07

## 2024-01-01 RX ADMIN — FIDAXOMICIN 200 MG: 200 TABLET, FILM COATED ORAL at 09:08

## 2024-01-01 RX ADMIN — ASPIRIN 81 MG: 81 TABLET, COATED ORAL at 08:07

## 2024-01-01 RX ADMIN — HYDROCODONE BITARTRATE AND ACETAMINOPHEN 1 TABLET: 5; 325 TABLET ORAL at 11:07

## 2024-01-01 RX ADMIN — VANCOMYCIN HYDROCHLORIDE 500 MG: 500 INJECTION, POWDER, LYOPHILIZED, FOR SOLUTION INTRAVENOUS at 05:08

## 2024-01-01 RX ADMIN — METOPROLOL TARTRATE 25 MG: 25 TABLET, FILM COATED ORAL at 08:07

## 2024-01-01 RX ADMIN — FAMOTIDINE 20 MG: 20 TABLET ORAL at 09:07

## 2024-01-01 RX ADMIN — METOPROLOL TARTRATE 25 MG: 25 TABLET, FILM COATED ORAL at 09:07

## 2024-01-01 RX ADMIN — CHOLESTYRAMINE 4 G: 4 POWDER, FOR SUSPENSION ORAL at 09:08

## 2024-01-01 RX ADMIN — FENTANYL CITRATE 25 MCG: 50 INJECTION INTRAMUSCULAR; INTRAVENOUS at 09:07

## 2024-01-01 RX ADMIN — PROPOFOL 25 MCG/KG/MIN: 10 INJECTION, EMULSION INTRAVENOUS at 10:07

## 2024-01-01 RX ADMIN — HEPARIN SODIUM AND DEXTROSE 17 UNITS/KG/HR: 10000; 5 INJECTION INTRAVENOUS at 11:07

## 2024-01-01 RX ADMIN — NOREPINEPHRINE BITARTRATE 0.06 MCG/KG/MIN: 4 INJECTION, SOLUTION INTRAVENOUS at 08:08

## 2024-01-01 RX ADMIN — METOPROLOL SUCCINATE 50 MG: 50 TABLET, EXTENDED RELEASE ORAL at 09:07

## 2024-01-01 RX ADMIN — MICONAZOLE NITRATE 2 % TOPICAL POWDER: at 09:07

## 2024-01-01 RX ADMIN — METOPROLOL SUCCINATE 50 MG: 50 TABLET, EXTENDED RELEASE ORAL at 08:07

## 2024-01-01 RX ADMIN — ASPIRIN 81 MG: 81 TABLET, COATED ORAL at 09:07

## 2024-01-01 RX ADMIN — METOROPROLOL TARTRATE 5 MG: 5 INJECTION, SOLUTION INTRAVENOUS at 10:07

## 2024-01-01 RX ADMIN — SODIUM CHLORIDE: 9 INJECTION, SOLUTION INTRAVENOUS at 06:08

## 2024-01-01 RX ADMIN — SACUBITRIL AND VALSARTAN 1 TABLET: 24; 26 TABLET, FILM COATED ORAL at 08:07

## 2024-01-01 RX ADMIN — DEXMEDETOMIDINE HYDROCHLORIDE 0.2 MCG/KG/HR: 4 INJECTION INTRAVENOUS at 08:08

## 2024-01-01 RX ADMIN — MAGNESIUM SULFATE 1 G: 500 INJECTION, SOLUTION INTRAMUSCULAR; INTRAVENOUS at 06:08

## 2024-01-01 RX ADMIN — SODIUM CHLORIDE: 0.9 INJECTION, SOLUTION INTRAVENOUS at 02:08

## 2024-01-01 RX ADMIN — SIMETHICONE 80 MG: 80 TABLET, CHEWABLE ORAL at 12:07

## 2024-01-01 RX ADMIN — ACETAMINOPHEN 650 MG: 325 TABLET ORAL at 03:07

## 2024-01-01 RX ADMIN — APIXABAN 5 MG: 5 TABLET, FILM COATED ORAL at 03:07

## 2024-01-01 RX ADMIN — OLANZAPINE 2.5 MG: 10 INJECTION, POWDER, FOR SOLUTION INTRAMUSCULAR at 11:07

## 2024-01-01 RX ADMIN — HYDROCORTISONE SODIUM SUCCINATE 100 MG: 100 INJECTION, POWDER, FOR SOLUTION INTRAMUSCULAR; INTRAVENOUS at 12:08

## 2024-01-01 RX ADMIN — SIMETHICONE 80 MG: 80 TABLET, CHEWABLE ORAL at 05:07

## 2024-01-01 RX ADMIN — FUROSEMIDE 40 MG: 10 INJECTION, SOLUTION INTRAVENOUS at 10:07

## 2024-01-01 RX ADMIN — FIDAXOMICIN 200 MG: 200 TABLET, FILM COATED ORAL at 10:07

## 2024-01-01 RX ADMIN — MUPIROCIN: 20 OINTMENT TOPICAL at 09:07

## 2024-01-01 RX ADMIN — CHOLESTYRAMINE 4 G: 4 POWDER, FOR SUSPENSION ORAL at 01:07

## 2024-01-01 RX ADMIN — ALBUMIN (HUMAN) 50 G: 12.5 SOLUTION INTRAVENOUS at 04:08

## 2024-01-01 RX ADMIN — GENTAMICIN SULFATE: 1 OINTMENT TOPICAL at 03:07

## 2024-01-01 RX ADMIN — ACETAMINOPHEN 650 MG: 325 TABLET ORAL at 04:07

## 2024-01-01 RX ADMIN — VANCOMYCIN HYDROCHLORIDE 125 MG: KIT at 06:07

## 2024-01-01 RX ADMIN — MICONAZOLE NITRATE 2 % TOPICAL POWDER: at 08:08

## 2024-01-01 RX ADMIN — ALUMINUM HYDROXIDE, MAGNESIUM HYDROXIDE, AND DIMETHICONE 10 ML: 400; 400; 40 SUSPENSION ORAL at 01:07

## 2024-01-01 RX ADMIN — PIPERACILLIN SODIUM AND TAZOBACTAM SODIUM 4.5 G: 4; .5 INJECTION, POWDER, FOR SOLUTION INTRAVENOUS at 02:08

## 2024-01-01 RX ADMIN — ALBUMIN (HUMAN) 25 G: 12.5 SOLUTION INTRAVENOUS at 05:07

## 2024-01-01 RX ADMIN — METOPROLOL TARTRATE 50 MG: 50 TABLET, FILM COATED ORAL at 08:07

## 2024-01-01 RX ADMIN — CHOLESTYRAMINE 4 G: 4 POWDER, FOR SUSPENSION ORAL at 08:08

## 2024-01-01 RX ADMIN — SIMETHICONE 80 MG: 80 TABLET, CHEWABLE ORAL at 06:07

## 2024-01-01 RX ADMIN — SIMETHICONE 80 MG: 80 TABLET, CHEWABLE ORAL at 09:07

## 2024-01-01 RX ADMIN — FUROSEMIDE 200 MG: 10 INJECTION, SOLUTION INTRAMUSCULAR; INTRAVENOUS at 09:07

## 2024-01-01 RX ADMIN — DEXTROSE MONOHYDRATE 250 ML: 100 INJECTION, SOLUTION INTRAVENOUS at 08:08

## 2024-01-01 RX ADMIN — ACETAMINOPHEN 1000 MG: 10 INJECTION, SOLUTION INTRAVENOUS at 01:07

## 2024-01-01 RX ADMIN — MIDODRINE HYDROCHLORIDE 10 MG: 5 TABLET ORAL at 08:07

## 2024-01-01 RX ADMIN — NOREPINEPHRINE BITARTRATE 0.08 MCG/KG/MIN: 1 INJECTION, SOLUTION, CONCENTRATE INTRAVENOUS at 10:08

## 2024-01-01 RX ADMIN — HEPARIN SODIUM AND DEXTROSE 14 UNITS/KG/HR: 10000; 5 INJECTION INTRAVENOUS at 10:07

## 2024-01-01 RX ADMIN — MAGNESIUM SULFATE HEPTAHYDRATE 2 G: 40 INJECTION, SOLUTION INTRAVENOUS at 12:08

## 2024-01-01 RX ADMIN — HYDROMORPHONE HYDROCHLORIDE 0.5 MG: 1 INJECTION, SOLUTION INTRAMUSCULAR; INTRAVENOUS; SUBCUTANEOUS at 03:08

## 2024-01-01 RX ADMIN — PROPOFOL 10 MCG/KG/MIN: 10 INJECTION, EMULSION INTRAVENOUS at 02:07

## 2024-01-01 RX ADMIN — AMIODARONE HYDROCHLORIDE 400 MG: 200 TABLET ORAL at 10:07

## 2024-01-01 RX ADMIN — SIMETHICONE 80 MG: 80 TABLET, CHEWABLE ORAL at 07:07

## 2024-01-01 RX ADMIN — AMIODARONE HYDROCHLORIDE 400 MG: 200 TABLET ORAL at 07:07

## 2024-01-01 RX ADMIN — ONDANSETRON 4 MG: 2 INJECTION INTRAMUSCULAR; INTRAVENOUS at 06:07

## 2024-01-01 RX ADMIN — AMIODARONE HYDROCHLORIDE 0.5 MG/MIN: 1.8 INJECTION, SOLUTION INTRAVENOUS at 06:08

## 2024-01-01 RX ADMIN — ALUMINUM HYDROXIDE, MAGNESIUM HYDROXIDE, AND DIMETHICONE 10 ML: 400; 400; 40 SUSPENSION ORAL at 08:07

## 2024-01-01 RX ADMIN — FAMOTIDINE 20 MG: 20 TABLET ORAL at 10:07

## 2024-01-01 RX ADMIN — HYDROCODONE BITARTRATE AND ACETAMINOPHEN 1 TABLET: 5; 325 TABLET ORAL at 08:07

## 2024-01-01 RX ADMIN — ALUMINUM HYDROXIDE, MAGNESIUM HYDROXIDE, AND DIMETHICONE 10 ML: 400; 400; 40 SUSPENSION ORAL at 09:08

## 2024-01-01 RX ADMIN — LIDOCAINE 5% 1 PATCH: 700 PATCH TOPICAL at 10:08

## 2024-01-01 RX ADMIN — ALUMINUM HYDROXIDE, MAGNESIUM HYDROXIDE, AND DIMETHICONE 10 ML: 400; 400; 40 SUSPENSION ORAL at 09:07

## 2024-01-01 RX ADMIN — HEPARIN SODIUM AND DEXTROSE 12 UNITS/KG/HR: 10000; 5 INJECTION INTRAVENOUS at 05:08

## 2024-01-01 RX ADMIN — AMIODARONE HYDROCHLORIDE 400 MG: 200 TABLET ORAL at 09:07

## 2024-01-01 RX ADMIN — HEPARIN SODIUM AND DEXTROSE 6 UNITS/KG/HR: 10000; 5 INJECTION INTRAVENOUS at 08:08

## 2024-01-01 RX ADMIN — SACUBITRIL AND VALSARTAN 1 TABLET: 24; 26 TABLET, FILM COATED ORAL at 09:07

## 2024-01-01 RX ADMIN — HYOSCYAMINE SULFATE 0.12 MG: 0.12 TABLET SUBLINGUAL at 01:07

## 2024-01-01 RX ADMIN — AMIODARONE HYDROCHLORIDE 0.5 MG/MIN: 1.8 INJECTION, SOLUTION INTRAVENOUS at 07:08

## 2024-01-01 RX ADMIN — CHOLESTYRAMINE 4 G: 4 POWDER, FOR SUSPENSION ORAL at 12:08

## 2024-01-01 RX ADMIN — CHOLESTYRAMINE 4 G: 4 POWDER, FOR SUSPENSION ORAL at 09:07

## 2024-01-01 RX ADMIN — MAGNESIUM SULFATE HEPTAHYDRATE 2 G: 40 INJECTION, SOLUTION INTRAVENOUS at 09:07

## 2024-01-01 RX ADMIN — CEFEPIME 1 G: 1 INJECTION, POWDER, FOR SOLUTION INTRAMUSCULAR; INTRAVENOUS at 08:07

## 2024-01-01 RX ADMIN — PIPERACILLIN SODIUM AND TAZOBACTAM SODIUM 4.5 G: 4; .5 INJECTION, POWDER, FOR SOLUTION INTRAVENOUS at 12:08

## 2024-01-01 RX ADMIN — TRAMADOL HYDROCHLORIDE 50 MG: 50 TABLET, COATED ORAL at 01:07

## 2024-01-01 RX ADMIN — OLANZAPINE 5 MG: 10 INJECTION, POWDER, FOR SOLUTION INTRAMUSCULAR at 08:08

## 2024-01-01 RX ADMIN — HEPARIN SODIUM 7500 UNITS: 5000 INJECTION INTRAVENOUS; SUBCUTANEOUS at 01:07

## 2024-01-01 RX ADMIN — NOREPINEPHRINE BITARTRATE 0.08 MCG/KG/MIN: 4 INJECTION, SOLUTION INTRAVENOUS at 08:08

## 2024-01-01 RX ADMIN — GENTAMICIN SULFATE: 1 OINTMENT TOPICAL at 02:07

## 2024-01-01 RX ADMIN — ACETAMINOPHEN 325 MG: 325 TABLET ORAL at 06:07

## 2024-01-01 RX ADMIN — DEXTROSE MONOHYDRATE 125 ML: 100 INJECTION, SOLUTION INTRAVENOUS at 10:07

## 2024-01-01 RX ADMIN — ONDANSETRON 4 MG: 2 INJECTION INTRAMUSCULAR; INTRAVENOUS at 03:07

## 2024-01-01 RX ADMIN — FAMOTIDINE 20 MG: 20 TABLET ORAL at 08:07

## 2024-01-01 RX ADMIN — METOPROLOL TARTRATE 25 MG: 25 TABLET, FILM COATED ORAL at 10:07

## 2024-01-01 RX ADMIN — MICONAZOLE NITRATE 2 % TOPICAL POWDER: at 09:08

## 2024-01-01 RX ADMIN — MIDODRINE HYDROCHLORIDE 10 MG: 5 TABLET ORAL at 02:07

## 2024-01-01 RX ADMIN — TRAMADOL HYDROCHLORIDE 50 MG: 50 TABLET, COATED ORAL at 11:07

## 2024-01-01 RX ADMIN — ALUMINUM HYDROXIDE, MAGNESIUM HYDROXIDE, AND DIMETHICONE 10 ML: 400; 400; 40 SUSPENSION ORAL at 02:08

## 2024-01-01 RX ADMIN — PIPERACILLIN SODIUM AND TAZOBACTAM SODIUM 4.5 G: 4; .5 INJECTION, POWDER, FOR SOLUTION INTRAVENOUS at 05:08

## 2024-01-01 RX ADMIN — HYDROCODONE BITARTRATE AND ACETAMINOPHEN 1 TABLET: 5; 325 TABLET ORAL at 01:07

## 2024-01-01 RX ADMIN — GENTAMICIN SULFATE: 1 OINTMENT TOPICAL at 08:07

## 2024-01-01 RX ADMIN — MUPIROCIN: 20 OINTMENT TOPICAL at 10:07

## 2024-01-01 RX ADMIN — MORPHINE SULFATE 4 MG: 2 INJECTION, SOLUTION INTRAMUSCULAR; INTRAVENOUS at 11:08

## 2024-01-01 RX ADMIN — ERYTHROPOIETIN 6430 UNITS: 4000 INJECTION, SOLUTION INTRAVENOUS; SUBCUTANEOUS at 02:08

## 2024-01-01 RX ADMIN — SODIUM PHOSPHATE, MONOBASIC, MONOHYDRATE AND SODIUM PHOSPHATE, DIBASIC, ANHYDROUS 30 MMOL: 142; 276 INJECTION, SOLUTION INTRAVENOUS at 04:07

## 2024-01-01 RX ADMIN — HEPARIN SODIUM AND DEXTROSE 12 UNITS/KG/HR: 10000; 5 INJECTION INTRAVENOUS at 07:07

## 2024-01-01 RX ADMIN — MIDODRINE HYDROCHLORIDE 10 MG: 5 TABLET ORAL at 11:08

## 2024-01-01 RX ADMIN — LIDOCAINE 5% 2 PATCH: 700 PATCH TOPICAL at 09:07

## 2024-01-01 RX ADMIN — AMIODARONE HYDROCHLORIDE 0.5 MG/MIN: 1.8 INJECTION, SOLUTION INTRAVENOUS at 02:07

## 2024-01-01 RX ADMIN — ALTEPLASE 4 MG: 2.2 INJECTION, POWDER, LYOPHILIZED, FOR SOLUTION INTRAVENOUS at 05:07

## 2024-01-01 RX ADMIN — PIPERACILLIN SODIUM AND TAZOBACTAM SODIUM 4.5 G: 4; .5 INJECTION, POWDER, FOR SOLUTION INTRAVENOUS at 09:08

## 2024-01-01 RX ADMIN — FLUDROCORTISONE ACETATE 100 MCG: 0.1 TABLET ORAL at 09:08

## 2024-01-01 RX ADMIN — METOPROLOL TARTRATE 25 MG: 25 TABLET, FILM COATED ORAL at 12:07

## 2024-01-01 RX ADMIN — CARVEDILOL 25 MG: 25 TABLET, FILM COATED ORAL at 09:07

## 2024-01-01 RX ADMIN — DIPHENHYDRAMINE HYDROCHLORIDE 25 MG: 25 CAPSULE ORAL at 09:07

## 2024-01-01 RX ADMIN — METOPROLOL TARTRATE 50 MG: 50 TABLET, FILM COATED ORAL at 10:07

## 2024-01-01 RX ADMIN — MIDODRINE HYDROCHLORIDE 5 MG: 5 TABLET ORAL at 09:07

## 2024-01-01 RX ADMIN — MIDODRINE HYDROCHLORIDE 10 MG: 5 TABLET ORAL at 10:07

## 2024-01-01 RX ADMIN — AMIODARONE HYDROCHLORIDE 1 MG/MIN: 1.8 INJECTION, SOLUTION INTRAVENOUS at 09:07

## 2024-01-01 RX ADMIN — VASOPRESSIN 0.04 UNITS/MIN: 20 INJECTION INTRAVENOUS at 10:08

## 2024-01-01 RX ADMIN — FAMOTIDINE 20 MG: 20 TABLET ORAL at 12:08

## 2024-01-01 RX ADMIN — PIPERACILLIN SODIUM AND TAZOBACTAM SODIUM 4.5 G: 4; .5 INJECTION, POWDER, FOR SOLUTION INTRAVENOUS at 04:08

## 2024-01-01 RX ADMIN — ERYTHROPOIETIN 6400 UNITS: 10000 INJECTION, SOLUTION INTRAVENOUS; SUBCUTANEOUS at 12:08

## 2024-01-01 RX ADMIN — DEXMEDETOMIDINE HYDROCHLORIDE 0.2 MCG/KG/HR: 4 INJECTION, SOLUTION INTRAVENOUS at 09:07

## 2024-01-01 RX ADMIN — MICONAZOLE NITRATE 2 % TOPICAL POWDER 1 G: at 11:07

## 2024-01-01 RX ADMIN — AMIODARONE HYDROCHLORIDE 150 MG: 1.5 INJECTION, SOLUTION INTRAVENOUS at 09:07

## 2024-01-01 RX ADMIN — ACETAMINOPHEN 650 MG: 325 TABLET ORAL at 01:07

## 2024-01-01 RX ADMIN — HYDROMORPHONE HYDROCHLORIDE 0.5 MG: 1 INJECTION, SOLUTION INTRAMUSCULAR; INTRAVENOUS; SUBCUTANEOUS at 01:08

## 2024-01-01 RX ADMIN — HYDROMORPHONE HYDROCHLORIDE 0.5 MG: 1 INJECTION, SOLUTION INTRAMUSCULAR; INTRAVENOUS; SUBCUTANEOUS at 06:08

## 2024-01-01 RX ADMIN — AMIODARONE HYDROCHLORIDE 0.5 MG/MIN: 1.8 INJECTION, SOLUTION INTRAVENOUS at 10:08

## 2024-01-01 RX ADMIN — METOROPROLOL TARTRATE 5 MG: 5 INJECTION, SOLUTION INTRAVENOUS at 03:07

## 2024-01-01 RX ADMIN — NOREPINEPHRINE BITARTRATE 0.02 MCG/KG/MIN: 4 INJECTION, SOLUTION INTRAVENOUS at 11:08

## 2024-01-01 RX ADMIN — PROPOFOL 35 MCG/KG/MIN: 10 INJECTION, EMULSION INTRAVENOUS at 02:07

## 2024-01-01 RX ADMIN — INSULIN ASPART 2 UNITS: 100 INJECTION, SOLUTION INTRAVENOUS; SUBCUTANEOUS at 12:08

## 2024-01-01 RX ADMIN — HEPARIN SODIUM AND DEXTROSE 14 UNITS/KG/HR: 10000; 5 INJECTION INTRAVENOUS at 09:07

## 2024-01-01 RX ADMIN — NOREPINEPHRINE BITARTRATE 0.06 MCG/KG/MIN: 4 INJECTION, SOLUTION INTRAVENOUS at 05:07

## 2024-01-01 RX ADMIN — ACETAMINOPHEN 650 MG: 325 TABLET ORAL at 07:07

## 2024-01-01 RX ADMIN — SODIUM CHLORIDE: 9 INJECTION, SOLUTION INTRAVENOUS at 02:07

## 2024-01-01 RX ADMIN — SODIUM CHLORIDE: 9 INJECTION, SOLUTION INTRAVENOUS at 11:07

## 2024-01-01 RX ADMIN — SODIUM CHLORIDE 125 MG: 9 INJECTION, SOLUTION INTRAVENOUS at 06:07

## 2024-01-01 RX ADMIN — SIMETHICONE 80 MG: 80 TABLET, CHEWABLE ORAL at 01:07

## 2024-01-01 RX ADMIN — HEPARIN SODIUM 5000 UNITS: 5000 INJECTION INTRAVENOUS; SUBCUTANEOUS at 05:07

## 2024-01-01 RX ADMIN — SODIUM CHLORIDE: 9 INJECTION, SOLUTION INTRAVENOUS at 12:07

## 2024-01-01 RX ADMIN — VANCOMYCIN HYDROCHLORIDE 125 MG: KIT at 01:07

## 2024-01-01 RX ADMIN — HEPARIN SODIUM AND DEXTROSE 12 UNITS/KG/HR: 10000; 5 INJECTION INTRAVENOUS at 09:07

## 2024-01-01 RX ADMIN — SIMETHICONE 80 MG: 80 TABLET, CHEWABLE ORAL at 10:07

## 2024-01-01 RX ADMIN — Medication 6 MG: at 08:07

## 2024-01-01 RX ADMIN — SODIUM CHLORIDE 125 MG: 9 INJECTION, SOLUTION INTRAVENOUS at 04:07

## 2024-01-01 RX ADMIN — SODIUM CHLORIDE: 9 INJECTION, SOLUTION INTRAVENOUS at 04:08

## 2024-01-01 RX ADMIN — VASOPRESSIN 0.04 UNITS/MIN: 20 INJECTION INTRAVENOUS at 06:08

## 2024-01-01 RX ADMIN — HYDROCODONE BITARTRATE AND ACETAMINOPHEN 1 TABLET: 5; 325 TABLET ORAL at 09:08

## 2024-01-01 RX ADMIN — HYDROXYZINE PAMOATE 25 MG: 25 CAPSULE ORAL at 11:08

## 2024-01-01 RX ADMIN — ALBUMIN (HUMAN) 25 G: 12.5 SOLUTION INTRAVENOUS at 09:07

## 2024-01-01 RX ADMIN — DIGOXIN 125 MCG: 250 INJECTION, SOLUTION INTRAMUSCULAR; INTRAVENOUS at 12:08

## 2024-01-01 RX ADMIN — VANCOMYCIN HYDROCHLORIDE 125 MG: KIT at 09:07

## 2024-01-01 RX ADMIN — ARGATROBAN 0.5 MCG/KG/MIN: 250 INJECTION, SOLUTION INTRAVENOUS at 07:08

## 2024-01-01 RX ADMIN — VASOPRESSIN 0.04 UNITS/MIN: 20 INJECTION INTRAVENOUS at 07:08

## 2024-01-01 RX ADMIN — HYDROMORPHONE HYDROCHLORIDE 0.5 MG: 1 INJECTION, SOLUTION INTRAMUSCULAR; INTRAVENOUS; SUBCUTANEOUS at 04:08

## 2024-01-01 RX ADMIN — CALCIUM GLUCONATE 1 G: 20 INJECTION, SOLUTION INTRAVENOUS at 12:07

## 2024-01-01 RX ADMIN — SODIUM PHOSPHATE, MONOBASIC, MONOHYDRATE AND SODIUM PHOSPHATE, DIBASIC, ANHYDROUS 20.01 MMOL: 142; 276 INJECTION, SOLUTION INTRAVENOUS at 10:08

## 2024-01-01 RX ADMIN — DEXTROSE MONOHYDRATE 125 ML: 100 INJECTION, SOLUTION INTRAVENOUS at 03:07

## 2024-01-01 RX ADMIN — AMIODARONE HYDROCHLORIDE 400 MG: 200 TABLET ORAL at 08:07

## 2024-01-01 RX ADMIN — SODIUM BICARBONATE 650 MG TABLET 1300 MG: at 04:07

## 2024-01-01 RX ADMIN — HEPARIN SODIUM AND DEXTROSE 15 UNITS/KG/HR: 10000; 5 INJECTION INTRAVENOUS at 02:07

## 2024-01-01 RX ADMIN — HEPARIN SODIUM AND DEXTROSE 12 UNITS/KG/HR: 10000; 5 INJECTION INTRAVENOUS at 01:07

## 2024-01-01 RX ADMIN — DEXMEDETOMIDINE HYDROCHLORIDE 0.8 MCG/KG/HR: 4 INJECTION, SOLUTION INTRAVENOUS at 04:08

## 2024-01-01 RX ADMIN — AMIODARONE HYDROCHLORIDE 1 MG/MIN: 1.8 INJECTION, SOLUTION INTRAVENOUS at 10:07

## 2024-01-01 RX ADMIN — ASPIRIN 81 MG: 81 TABLET, COATED ORAL at 07:07

## 2024-01-01 RX ADMIN — SACUBITRIL AND VALSARTAN 1 TABLET: 24; 26 TABLET, FILM COATED ORAL at 10:07

## 2024-01-01 RX ADMIN — FENTANYL CITRATE 25 MCG: 50 INJECTION INTRAMUSCULAR; INTRAVENOUS at 10:07

## 2024-01-01 RX ADMIN — MIDODRINE HYDROCHLORIDE 10 MG: 5 TABLET ORAL at 04:07

## 2024-01-01 RX ADMIN — HYDROCORTISONE SODIUM SUCCINATE 100 MG: 100 INJECTION, POWDER, FOR SOLUTION INTRAMUSCULAR; INTRAVENOUS at 10:08

## 2024-01-01 RX ADMIN — ACETAMINOPHEN 325 MG: 325 TABLET ORAL at 09:07

## 2024-01-01 RX ADMIN — ALUMINUM HYDROXIDE, MAGNESIUM HYDROXIDE, AND DIMETHICONE 10 ML: 400; 400; 40 SUSPENSION ORAL at 05:07

## 2024-01-01 RX ADMIN — MIDODRINE HYDROCHLORIDE 5 MG: 5 TABLET ORAL at 04:07

## 2024-01-01 RX ADMIN — MIDODRINE HYDROCHLORIDE 10 MG: 5 TABLET ORAL at 03:07

## 2024-01-01 RX ADMIN — HYDROCODONE BITARTRATE AND ACETAMINOPHEN 1 TABLET: 5; 325 TABLET ORAL at 11:08

## 2024-01-01 RX ADMIN — APIXABAN 5 MG: 5 TABLET, FILM COATED ORAL at 08:07

## 2024-01-01 RX ADMIN — HEPARIN SODIUM AND DEXTROSE 9 UNITS/KG/HR: 10000; 5 INJECTION INTRAVENOUS at 05:08

## 2024-01-01 RX ADMIN — LIDOCAINE 5% 2 PATCH: 700 PATCH TOPICAL at 09:08

## 2024-01-01 RX ADMIN — INSULIN ASPART 2 UNITS: 100 INJECTION, SOLUTION INTRAVENOUS; SUBCUTANEOUS at 11:07

## 2024-01-01 RX ADMIN — DIPHENHYDRAMINE HYDROCHLORIDE 25 MG: 25 CAPSULE ORAL at 08:07

## 2024-01-01 RX ADMIN — DIPHENHYDRAMINE HYDROCHLORIDE 25 MG: 25 CAPSULE ORAL at 03:07

## 2024-01-01 RX ADMIN — ALBUMIN (HUMAN) 25 G: 12.5 SOLUTION INTRAVENOUS at 06:07

## 2024-01-01 RX ADMIN — GENTAMICIN SULFATE: 1 OINTMENT TOPICAL at 11:07

## 2024-01-01 RX ADMIN — METOROPROLOL TARTRATE 5 MG: 5 INJECTION, SOLUTION INTRAVENOUS at 07:08

## 2024-01-01 RX ADMIN — HEPARIN SODIUM AND DEXTROSE 6 UNITS/KG/HR: 10000; 5 INJECTION INTRAVENOUS at 10:08

## 2024-01-01 RX ADMIN — MUPIROCIN: 20 OINTMENT TOPICAL at 08:07

## 2024-01-01 RX ADMIN — PANTOPRAZOLE SODIUM 40 MG: 40 TABLET, DELAYED RELEASE ORAL at 01:07

## 2024-01-01 RX ADMIN — Medication 0.08 MCG/KG/MIN: at 08:08

## 2024-01-01 RX ADMIN — VANCOMYCIN HYDROCHLORIDE 125 MG: KIT at 11:07

## 2024-01-01 RX ADMIN — LIDOCAINE HYDROCHLORIDE 5 ML: 10 INJECTION, SOLUTION INFILTRATION; PERINEURAL at 07:08

## 2024-01-01 RX ADMIN — HEPARIN SODIUM 5000 UNITS: 1000 INJECTION, SOLUTION INTRAVENOUS; SUBCUTANEOUS at 06:08

## 2024-01-01 RX ADMIN — SODIUM CHLORIDE: 9 INJECTION, SOLUTION INTRAVENOUS at 03:07

## 2024-01-01 RX ADMIN — MICONAZOLE NITRATE 2 % TOPICAL POWDER: at 03:08

## 2024-01-01 RX ADMIN — HYDROMORPHONE HYDROCHLORIDE 0.5 MG: 1 INJECTION, SOLUTION INTRAMUSCULAR; INTRAVENOUS; SUBCUTANEOUS at 12:08

## 2024-01-01 RX ADMIN — DIGOXIN 125 MCG: 250 INJECTION, SOLUTION INTRAMUSCULAR; INTRAVENOUS at 06:08

## 2024-01-01 RX ADMIN — ONDANSETRON 4 MG: 2 INJECTION INTRAMUSCULAR; INTRAVENOUS at 05:07

## 2024-01-01 RX ADMIN — DEXTROSE MONOHYDRATE 250 ML: 100 INJECTION, SOLUTION INTRAVENOUS at 09:08

## 2024-01-01 RX ADMIN — MORPHINE SULFATE 2 MG: 2 INJECTION, SOLUTION INTRAMUSCULAR; INTRAVENOUS at 10:08

## 2024-01-01 RX ADMIN — INSULIN ASPART 1 UNITS: 100 INJECTION, SOLUTION INTRAVENOUS; SUBCUTANEOUS at 09:07

## 2024-01-01 RX ADMIN — INSULIN ASPART 2 UNITS: 100 INJECTION, SOLUTION INTRAVENOUS; SUBCUTANEOUS at 09:07

## 2024-01-01 RX ADMIN — METOPROLOL TARTRATE 12.5 MG: 25 TABLET, FILM COATED ORAL at 09:07

## 2024-01-01 RX ADMIN — ALBUMIN (HUMAN) 25 G: 12.5 SOLUTION INTRAVENOUS at 10:07

## 2024-01-01 RX ADMIN — MICONAZOLE NITRATE 2 % TOPICAL POWDER: at 10:07

## 2024-01-01 RX ADMIN — FLUCONAZOLE 200 MG: 100 TABLET ORAL at 10:07

## 2024-01-01 RX ADMIN — ALUMINUM HYDROXIDE, MAGNESIUM HYDROXIDE, AND DIMETHICONE 10 ML: 400; 400; 40 SUSPENSION ORAL at 01:08

## 2024-01-01 RX ADMIN — FIDAXOMICIN 200 MG: 200 TABLET, FILM COATED ORAL at 12:08

## 2024-01-01 RX ADMIN — ERYTHROPOIETIN 6400 UNITS: 10000 INJECTION, SOLUTION INTRAVENOUS; SUBCUTANEOUS at 03:08

## 2024-01-01 RX ADMIN — SIMETHICONE 80 MG: 80 TABLET, CHEWABLE ORAL at 11:07

## 2024-01-01 RX ADMIN — ACETAMINOPHEN 650 MG: 325 TABLET ORAL at 05:08

## 2024-01-01 RX ADMIN — HYDROMORPHONE HYDROCHLORIDE 0.5 MG: 1 INJECTION, SOLUTION INTRAMUSCULAR; INTRAVENOUS; SUBCUTANEOUS at 05:08

## 2024-01-01 RX ADMIN — FIDAXOMICIN 200 MG: 200 TABLET, FILM COATED ORAL at 08:08

## 2024-01-01 RX ADMIN — MICONAZOLE NITRATE 2 % TOPICAL POWDER: at 01:08

## 2024-01-01 RX ADMIN — HEPARIN SODIUM 250 UNITS/HR: 10000 INJECTION, SOLUTION INTRAVENOUS at 04:07

## 2024-01-01 RX ADMIN — SODIUM CHLORIDE: 9 INJECTION, SOLUTION INTRAVENOUS at 01:07

## 2024-01-01 RX ADMIN — MIDODRINE HYDROCHLORIDE 10 MG: 5 TABLET ORAL at 01:08

## 2024-01-01 RX ADMIN — FUROSEMIDE 160 MG: 10 INJECTION, SOLUTION INTRAMUSCULAR; INTRAVENOUS at 03:07

## 2024-01-01 RX ADMIN — ACETAMINOPHEN 325 MG: 325 TABLET ORAL at 11:07

## 2024-01-01 RX ADMIN — SODIUM CHLORIDE 500 ML: 0.9 INJECTION, SOLUTION INTRAVENOUS at 12:08

## 2024-01-01 RX ADMIN — HEPARIN SODIUM 11 UNITS/KG/HR: 10000 INJECTION, SOLUTION INTRAVENOUS at 03:07

## 2024-01-01 RX ADMIN — DEXTROSE MONOHYDRATE: 100 INJECTION, SOLUTION INTRAVENOUS at 12:08

## 2024-01-01 RX ADMIN — HYDROCORTISONE SODIUM SUCCINATE 100 MG: 100 INJECTION, POWDER, FOR SOLUTION INTRAMUSCULAR; INTRAVENOUS at 03:08

## 2024-01-01 RX ADMIN — HEPARIN SODIUM 5000 UNITS: 5000 INJECTION INTRAVENOUS; SUBCUTANEOUS at 09:07

## 2024-01-01 RX ADMIN — HEPARIN SODIUM 13 UNITS/KG/HR: 10000 INJECTION, SOLUTION INTRAVENOUS at 03:07

## 2024-01-01 RX ADMIN — ALUMINUM HYDROXIDE, MAGNESIUM HYDROXIDE, AND SIMETHICONE 30 ML: 1200; 120; 1200 SUSPENSION ORAL at 02:07

## 2024-01-01 RX ADMIN — HEPARIN SODIUM AND DEXTROSE 17 UNITS/KG/HR: 10000; 5 INJECTION INTRAVENOUS at 03:07

## 2024-01-01 RX ADMIN — HEPARIN SODIUM AND DEXTROSE 17 UNITS/KG/HR: 10000; 5 INJECTION INTRAVENOUS at 10:07

## 2024-01-01 RX ADMIN — VASOPRESSIN 0.04 UNITS/MIN: 20 INJECTION INTRAVENOUS at 09:08

## 2024-01-01 RX ADMIN — DILTIAZEM HYDROCHLORIDE 5 MG/HR: 5 INJECTION INTRAVENOUS at 07:07

## 2024-01-01 RX ADMIN — METOROPROLOL TARTRATE 5 MG: 5 INJECTION, SOLUTION INTRAVENOUS at 01:07

## 2024-01-01 RX ADMIN — METOPROLOL TARTRATE 12.5 MG: 25 TABLET, FILM COATED ORAL at 08:07

## 2024-01-01 RX ADMIN — GENTAMICIN SULFATE: 1 OINTMENT TOPICAL at 10:07

## 2024-01-01 RX ADMIN — LIDOCAINE 5% 2 PATCH: 700 PATCH TOPICAL at 11:07

## 2024-01-01 RX ADMIN — FUROSEMIDE 200 MG: 10 INJECTION, SOLUTION INTRAMUSCULAR; INTRAVENOUS at 11:07

## 2024-01-01 RX ADMIN — METOPROLOL TARTRATE 75 MG: 50 TABLET, FILM COATED ORAL at 02:07

## 2024-01-01 RX ADMIN — METOPROLOL TARTRATE 12.5 MG: 25 TABLET, FILM COATED ORAL at 10:07

## 2024-01-01 RX ADMIN — FERROUS SULFATE TAB EC 325 MG (65 MG FE EQUIVALENT) 1 EACH: 325 (65 FE) TABLET DELAYED RESPONSE at 10:07

## 2024-01-01 RX ADMIN — LIDOCAINE 5% 2 PATCH: 700 PATCH TOPICAL at 10:07

## 2024-01-01 RX ADMIN — METOROPROLOL TARTRATE 5 MG: 5 INJECTION, SOLUTION INTRAVENOUS at 11:07

## 2024-01-01 RX ADMIN — LOPERAMIDE HYDROCHLORIDE 2 MG: 2 CAPSULE ORAL at 11:07

## 2024-01-01 RX ADMIN — MAGNESIUM SULFATE HEPTAHYDRATE 2 G: 40 INJECTION, SOLUTION INTRAVENOUS at 12:07

## 2024-01-01 RX ADMIN — HEPARIN SODIUM AND DEXTROSE 12 UNITS/KG/HR: 10000; 5 INJECTION INTRAVENOUS at 12:07

## 2024-01-01 RX ADMIN — MIDODRINE HYDROCHLORIDE 5 MG: 5 TABLET ORAL at 07:07

## 2024-01-01 RX ADMIN — FIDAXOMICIN 200 MG: 200 TABLET, FILM COATED ORAL at 01:08

## 2024-01-01 RX ADMIN — METOPROLOL TARTRATE 50 MG: 50 TABLET, FILM COATED ORAL at 03:07

## 2024-01-01 RX ADMIN — SODIUM CHLORIDE: 9 INJECTION, SOLUTION INTRAVENOUS at 01:08

## 2024-01-01 RX ADMIN — DIPHENHYDRAMINE HYDROCHLORIDE 25 MG: 25 CAPSULE ORAL at 01:07

## 2024-01-01 RX ADMIN — METOPROLOL TARTRATE 25 MG: 25 TABLET, FILM COATED ORAL at 05:07

## 2024-01-01 RX ADMIN — TRAMADOL HYDROCHLORIDE 50 MG: 50 TABLET, COATED ORAL at 05:07

## 2024-01-01 RX ADMIN — CARVEDILOL 12.5 MG: 12.5 TABLET, FILM COATED ORAL at 08:07

## 2024-01-01 RX ADMIN — ALUMINUM HYDROXIDE, MAGNESIUM HYDROXIDE, AND DIMETHICONE 10 ML: 400; 400; 40 SUSPENSION ORAL at 05:08

## 2024-01-01 RX ADMIN — LIDOCAINE 5% 2 PATCH: 700 PATCH TOPICAL at 11:08

## 2024-01-01 RX ADMIN — DIGOXIN 250 MCG: 0.25 INJECTION INTRAMUSCULAR; INTRAVENOUS at 09:08

## 2024-01-01 RX ADMIN — CEFEPIME 1 G: 1 INJECTION, POWDER, FOR SOLUTION INTRAMUSCULAR; INTRAVENOUS at 09:07

## 2024-01-01 RX ADMIN — NOREPINEPHRINE BITARTRATE 0.1 MCG/KG/MIN: 1 INJECTION, SOLUTION, CONCENTRATE INTRAVENOUS at 11:08

## 2024-01-01 RX ADMIN — FIDAXOMICIN 200 MG: 200 TABLET, FILM COATED ORAL at 02:08

## 2024-01-01 RX ADMIN — HYDROMORPHONE HYDROCHLORIDE 0.5 MG: 1 INJECTION, SOLUTION INTRAMUSCULAR; INTRAVENOUS; SUBCUTANEOUS at 11:08

## 2024-01-01 RX ADMIN — METOPROLOL TARTRATE 50 MG: 50 TABLET, FILM COATED ORAL at 12:08

## 2024-01-01 RX ADMIN — FLUDROCORTISONE ACETATE 100 MCG: 0.1 TABLET ORAL at 02:08

## 2024-01-01 RX ADMIN — SODIUM CHLORIDE: 9 INJECTION, SOLUTION INTRAVENOUS at 09:08

## 2024-01-01 RX ADMIN — FERROUS SULFATE TAB EC 325 MG (65 MG FE EQUIVALENT) 1 EACH: 325 (65 FE) TABLET DELAYED RESPONSE at 08:07

## 2024-01-01 RX ADMIN — HEPARIN SODIUM AND DEXTROSE 17 UNITS/KG/HR: 10000; 5 INJECTION INTRAVENOUS at 04:07

## 2024-01-01 RX ADMIN — LIDOCAINE 5% 2 PATCH: 700 PATCH TOPICAL at 08:07

## 2024-01-01 RX ADMIN — SODIUM CHLORIDE: 9 INJECTION, SOLUTION INTRAVENOUS at 11:08

## 2024-01-01 RX ADMIN — NOREPINEPHRINE BITARTRATE 0.1 MCG/KG/MIN: 4 INJECTION, SOLUTION INTRAVENOUS at 09:08

## 2024-01-01 RX ADMIN — SIMETHICONE 80 MG: 80 TABLET, CHEWABLE ORAL at 02:07

## 2024-01-01 RX ADMIN — LORAZEPAM 1 MG: 2 INJECTION INTRAMUSCULAR; INTRAVENOUS at 11:08

## 2024-01-01 RX ADMIN — NOREPINEPHRINE BITARTRATE 0.04 MCG/KG/MIN: 1 INJECTION, SOLUTION, CONCENTRATE INTRAVENOUS at 12:08

## 2024-01-01 RX ADMIN — ALBUMIN (HUMAN) 100 G: 12.5 SOLUTION INTRAVENOUS at 07:08

## 2024-01-01 RX ADMIN — VASOPRESSIN 0.04 UNITS/MIN: 20 INJECTION INTRAVENOUS at 01:08

## 2024-01-01 RX ADMIN — NOREPINEPHRINE BITARTRATE 0.02 MCG/KG/MIN: 4 INJECTION, SOLUTION INTRAVENOUS at 03:08

## 2024-01-01 RX ADMIN — FUROSEMIDE 40 MG: 10 INJECTION, SOLUTION INTRAVENOUS at 09:07

## 2024-01-01 RX ADMIN — NOREPINEPHRINE BITARTRATE 0.06 MCG/KG/MIN: 4 INJECTION, SOLUTION INTRAVENOUS at 03:07

## 2024-01-01 RX ADMIN — NOREPINEPHRINE BITARTRATE 0.08 MCG/KG/MIN: 4 INJECTION, SOLUTION INTRAVENOUS at 12:08

## 2024-01-01 RX ADMIN — ALBUMIN (HUMAN) 25 G: 12.5 SOLUTION INTRAVENOUS at 11:07

## 2024-01-01 RX ADMIN — AMIODARONE HYDROCHLORIDE 1 MG/MIN: 1.8 INJECTION, SOLUTION INTRAVENOUS at 03:07

## 2024-01-01 RX ADMIN — HEPARIN SODIUM AND DEXTROSE 14 UNITS/KG/HR: 10000; 5 INJECTION INTRAVENOUS at 02:07

## 2024-01-01 RX ADMIN — SODIUM CHLORIDE 250 ML: 9 INJECTION, SOLUTION INTRAVENOUS at 12:08

## 2024-01-01 RX ADMIN — NOREPINEPHRINE BITARTRATE 0.06 MCG/KG/MIN: 4 INJECTION, SOLUTION INTRAVENOUS at 02:07

## 2024-01-01 RX ADMIN — HEPARIN SODIUM AND DEXTROSE 17 UNITS/KG/HR: 10000; 5 INJECTION INTRAVENOUS at 05:07

## 2024-01-01 RX ADMIN — HEPARIN SODIUM 5000 UNITS: 5000 INJECTION INTRAVENOUS; SUBCUTANEOUS at 01:07

## 2024-01-01 RX ADMIN — SIMETHICONE 80 MG: 80 TABLET, CHEWABLE ORAL at 08:07

## 2024-01-01 RX ADMIN — SIMETHICONE 80 MG: 80 TABLET, CHEWABLE ORAL at 04:07

## 2024-01-01 RX ADMIN — MAGNESIUM SULFATE HEPTAHYDRATE 2 G: 40 INJECTION, SOLUTION INTRAVENOUS at 04:07

## 2024-01-01 RX ADMIN — DIPHENHYDRAMINE HYDROCHLORIDE 12.5 MG: 50 INJECTION, SOLUTION INTRAMUSCULAR; INTRAVENOUS at 12:08

## 2024-01-01 RX ADMIN — AMIODARONE HYDROCHLORIDE 150 MG: 1.5 INJECTION, SOLUTION INTRAVENOUS at 02:08

## 2024-01-01 RX ADMIN — FUROSEMIDE 200 MG: 10 INJECTION, SOLUTION INTRAMUSCULAR; INTRAVENOUS at 10:07

## 2024-01-01 RX ADMIN — ACETAMINOPHEN 1000 MG: 500 TABLET ORAL at 09:08

## 2024-01-01 RX ADMIN — AMIODARONE HYDROCHLORIDE 400 MG: 200 TABLET ORAL at 12:07

## 2024-01-01 RX ADMIN — SODIUM CHLORIDE: 9 INJECTION, SOLUTION INTRAVENOUS at 07:07

## 2024-01-01 RX ADMIN — ALBUMIN (HUMAN) 25 G: 12.5 SOLUTION INTRAVENOUS at 02:07

## 2024-01-01 RX ADMIN — HEPARIN SODIUM AND DEXTROSE 12 UNITS/KG/HR: 10000; 5 INJECTION INTRAVENOUS at 02:07

## 2024-01-01 RX ADMIN — FERROUS SULFATE TAB EC 325 MG (65 MG FE EQUIVALENT) 1 EACH: 325 (65 FE) TABLET DELAYED RESPONSE at 12:08

## 2024-01-01 RX ADMIN — GUAIFENESIN AND DEXTROMETHORPHAN 5 ML: 100; 10 SYRUP ORAL at 01:07

## 2024-01-01 RX ADMIN — ALTEPLASE 2 MG: 2.2 INJECTION, POWDER, LYOPHILIZED, FOR SOLUTION INTRAVENOUS at 06:07

## 2024-01-01 RX ADMIN — NOREPINEPHRINE BITARTRATE 0.04 MCG/KG/MIN: 4 INJECTION, SOLUTION INTRAVENOUS at 12:08

## 2024-01-01 RX ADMIN — ALBUMIN (HUMAN) 100 G: 12.5 SOLUTION INTRAVENOUS at 03:08

## 2024-01-01 RX ADMIN — HEPARIN SODIUM 12 UNITS/KG/HR: 10000 INJECTION, SOLUTION INTRAVENOUS at 12:07

## 2024-01-01 RX ADMIN — PIPERACILLIN SODIUM AND TAZOBACTAM SODIUM 4.5 G: 4; .5 INJECTION, POWDER, FOR SOLUTION INTRAVENOUS at 11:08

## 2024-01-01 RX ADMIN — DEXMEDETOMIDINE HYDROCHLORIDE 0.2 MCG/KG/HR: 4 INJECTION, SOLUTION INTRAVENOUS at 01:08

## 2024-01-01 RX ADMIN — VASOPRESSIN 0.04 UNITS/MIN: 20 INJECTION INTRAVENOUS at 03:08

## 2024-01-01 RX ADMIN — SODIUM CHLORIDE: 9 INJECTION, SOLUTION INTRAVENOUS at 03:08

## 2024-01-01 RX ADMIN — ALUMINUM HYDROXIDE, MAGNESIUM HYDROXIDE, AND DIMETHICONE 10 ML: 400; 400; 40 SUSPENSION ORAL at 08:08

## 2024-01-01 RX ADMIN — TRAMADOL HYDROCHLORIDE 50 MG: 50 TABLET, COATED ORAL at 08:08

## 2024-01-01 RX ADMIN — HEPARIN SODIUM 5000 UNITS: 1000 INJECTION, SOLUTION INTRAVENOUS; SUBCUTANEOUS at 03:07

## 2024-01-01 RX ADMIN — METOPROLOL TARTRATE 50 MG: 50 TABLET, FILM COATED ORAL at 09:08

## 2024-01-01 RX ADMIN — ALTEPLASE 4 MG: 2.2 INJECTION, POWDER, LYOPHILIZED, FOR SOLUTION INTRAVENOUS at 07:07

## 2024-01-01 RX ADMIN — HYDROCODONE BITARTRATE AND ACETAMINOPHEN 1 TABLET: 5; 325 TABLET ORAL at 07:07

## 2024-01-01 RX ADMIN — ACETAMINOPHEN 1000 MG: 500 TABLET ORAL at 11:08

## 2024-01-01 RX ADMIN — NITROGLYCERIN 0.5 INCH: 20 OINTMENT TOPICAL at 09:08

## 2024-01-01 RX ADMIN — ALTEPLASE 2 MG: 2.2 INJECTION, POWDER, LYOPHILIZED, FOR SOLUTION INTRAVENOUS at 04:07

## 2024-01-01 RX ADMIN — ONDANSETRON 4 MG: 2 INJECTION INTRAMUSCULAR; INTRAVENOUS at 06:08

## 2024-01-01 RX ADMIN — NITROGLYCERIN 0.5 INCH: 20 OINTMENT TOPICAL at 03:08

## 2024-01-01 RX ADMIN — HEPARIN SODIUM 1000 UNITS: 1000 INJECTION, SOLUTION INTRAVENOUS; SUBCUTANEOUS at 03:07

## 2024-01-01 RX ADMIN — ACETAMINOPHEN 1000 MG: 500 TABLET ORAL at 10:07

## 2024-01-01 RX ADMIN — CHOLESTYRAMINE 4 G: 4 POWDER, FOR SUSPENSION ORAL at 02:08

## 2024-01-01 RX ADMIN — HYDROCORTISONE SODIUM SUCCINATE 100 MG: 100 INJECTION, POWDER, FOR SOLUTION INTRAMUSCULAR; INTRAVENOUS at 06:08

## 2024-01-01 RX ADMIN — SODIUM CHLORIDE: 0.9 INJECTION, SOLUTION INTRAVENOUS at 06:08

## 2024-01-01 RX ADMIN — DIGOXIN 500 MCG: 0.25 INJECTION INTRAMUSCULAR; INTRAVENOUS at 04:07

## 2024-01-01 RX ADMIN — ASPIRIN 81 MG: 81 TABLET, COATED ORAL at 10:07

## 2024-01-01 RX ADMIN — METOPROLOL SUCCINATE 50 MG: 50 TABLET, EXTENDED RELEASE ORAL at 10:07

## 2024-01-01 RX ADMIN — VANCOMYCIN HYDROCHLORIDE 500 MG: 500 INJECTION, POWDER, LYOPHILIZED, FOR SOLUTION INTRAVENOUS at 03:07

## 2024-01-01 RX ADMIN — ACETAMINOPHEN 650 MG: 325 TABLET ORAL at 10:07

## 2024-01-01 RX ADMIN — ACETAMINOPHEN 650 MG: 325 TABLET ORAL at 06:07

## 2024-01-01 RX ADMIN — HEPARIN SODIUM AND DEXTROSE 11 UNITS/KG/HR: 10000; 5 INJECTION INTRAVENOUS at 05:07

## 2024-01-01 RX ADMIN — LIDOCAINE 5% 1 PATCH: 700 PATCH TOPICAL at 12:08

## 2024-01-01 RX ADMIN — INSULIN ASPART 1 UNITS: 100 INJECTION, SOLUTION INTRAVENOUS; SUBCUTANEOUS at 10:07

## 2024-01-01 RX ADMIN — APIXABAN 5 MG: 5 TABLET, FILM COATED ORAL at 10:07

## 2024-01-01 RX ADMIN — SODIUM CHLORIDE: 0.9 INJECTION, SOLUTION INTRAVENOUS at 04:07

## 2024-01-01 RX ADMIN — AMIODARONE HYDROCHLORIDE 1 MG/MIN: 1.8 INJECTION, SOLUTION INTRAVENOUS at 03:08

## 2024-01-01 RX ADMIN — VASOPRESSIN 0.04 UNITS/MIN: 20 INJECTION INTRAVENOUS at 11:08

## 2024-01-01 RX ADMIN — DEXTROSE MONOHYDRATE 125 ML: 100 INJECTION, SOLUTION INTRAVENOUS at 04:07

## 2024-01-01 RX ADMIN — VASOPRESSIN 0.04 UNITS/MIN: 20 INJECTION INTRAVENOUS at 08:08

## 2024-01-01 RX ADMIN — FIDAXOMICIN 200 MG: 200 TABLET, FILM COATED ORAL at 11:07

## 2024-01-01 RX ADMIN — LIDOCAINE 5% 1 PATCH: 700 PATCH TOPICAL at 10:07

## 2024-01-01 RX ADMIN — HEPARIN SODIUM AND DEXTROSE 250 UNITS/HR: 10000; 5 INJECTION INTRAVENOUS at 12:07

## 2024-01-01 RX ADMIN — FIDAXOMICIN 200 MG: 200 TABLET, FILM COATED ORAL at 08:07

## 2024-01-01 RX ADMIN — METOROPROLOL TARTRATE 5 MG: 5 INJECTION, SOLUTION INTRAVENOUS at 11:08

## 2024-01-01 RX ADMIN — LIDOCAINE HYDROCHLORIDE 5 ML: 10 SOLUTION INTRAVENOUS at 07:08

## 2024-01-01 RX ADMIN — ACETAMINOPHEN 325 MG: 325 TABLET ORAL at 04:07

## 2024-01-01 RX ADMIN — FAMOTIDINE 20 MG: 10 INJECTION, SOLUTION INTRAVENOUS at 02:07

## 2024-01-01 RX ADMIN — NOREPINEPHRINE BITARTRATE 0.14 MCG/KG/MIN: 4 INJECTION, SOLUTION INTRAVENOUS at 04:08

## 2024-01-01 RX ADMIN — DIPHENHYDRAMINE HYDROCHLORIDE 12.5 MG: 50 INJECTION, SOLUTION INTRAMUSCULAR; INTRAVENOUS at 09:08

## 2024-01-01 RX ADMIN — HEPARIN SODIUM AND DEXTROSE 15 UNITS/KG/HR: 10000; 5 INJECTION INTRAVENOUS at 11:07

## 2024-01-22 ENCOUNTER — LAB VISIT (OUTPATIENT)
Dept: LAB | Facility: HOSPITAL | Age: 74
End: 2024-01-22
Payer: MEDICARE

## 2024-01-22 DIAGNOSIS — D50.9 IRON DEFICIENCY ANEMIA, UNSPECIFIED: ICD-10-CM

## 2024-01-22 DIAGNOSIS — E04.1 NODULAR THYROID DISEASE: ICD-10-CM

## 2024-01-22 DIAGNOSIS — N18.4 CHRONIC KIDNEY DISEASE, STAGE IV (SEVERE): ICD-10-CM

## 2024-01-22 DIAGNOSIS — N25.81 SECONDARY HYPERPARATHYROIDISM OF RENAL ORIGIN: ICD-10-CM

## 2024-01-22 DIAGNOSIS — R25.2 CRAMP OF LIMB: ICD-10-CM

## 2024-01-22 DIAGNOSIS — M10.9 GOUT, UNSPECIFIED: ICD-10-CM

## 2024-01-22 DIAGNOSIS — E11.65 TYPE 2 DIABETES MELLITUS WITH HYPERGLYCEMIA, WITHOUT LONG-TERM CURRENT USE OF INSULIN: ICD-10-CM

## 2024-01-22 LAB
ALBUMIN SERPL BCP-MCNC: 3.2 G/DL (ref 3.5–5.2)
ANION GAP SERPL CALC-SCNC: 11 MMOL/L (ref 8–16)
BACTERIA #/AREA URNS AUTO: NORMAL /HPF
BASOPHILS # BLD AUTO: 0.02 K/UL (ref 0–0.2)
BASOPHILS NFR BLD: 0.3 % (ref 0–1.9)
BUN SERPL-MCNC: 67 MG/DL (ref 8–23)
CALCIUM SERPL-MCNC: 9.2 MG/DL (ref 8.7–10.5)
CHLORIDE SERPL-SCNC: 108 MMOL/L (ref 95–110)
CO2 SERPL-SCNC: 25 MMOL/L (ref 23–29)
CREAT SERPL-MCNC: 3.2 MG/DL (ref 0.5–1.4)
DIFFERENTIAL METHOD BLD: ABNORMAL
EOSINOPHIL # BLD AUTO: 0.4 K/UL (ref 0–0.5)
EOSINOPHIL NFR BLD: 4.6 % (ref 0–8)
ERYTHROCYTE [DISTWIDTH] IN BLOOD BY AUTOMATED COUNT: 16.4 % (ref 11.5–14.5)
EST. GFR  (NO RACE VARIABLE): 14.9 ML/MIN/1.73 M^2
ESTIMATED AVG GLUCOSE: 108 MG/DL (ref 68–131)
FERRITIN SERPL-MCNC: 141 NG/ML (ref 20–300)
GLUCOSE SERPL-MCNC: 99 MG/DL (ref 70–110)
HBA1C MFR BLD: 5.4 % (ref 4–5.6)
HCT VFR BLD AUTO: 31.5 % (ref 37–48.5)
HGB BLD-MCNC: 9.5 G/DL (ref 12–16)
IMM GRANULOCYTES # BLD AUTO: 0.02 K/UL (ref 0–0.04)
IMM GRANULOCYTES NFR BLD AUTO: 0.3 % (ref 0–0.5)
IRON SERPL-MCNC: 36 UG/DL (ref 30–160)
IRON SERPL-MCNC: 36 UG/DL (ref 30–160)
LYMPHOCYTES # BLD AUTO: 1.9 K/UL (ref 1–4.8)
LYMPHOCYTES NFR BLD: 24.4 % (ref 18–48)
MAGNESIUM SERPL-MCNC: 2.4 MG/DL (ref 1.6–2.6)
MCH RBC QN AUTO: 25.3 PG (ref 27–31)
MCHC RBC AUTO-ENTMCNC: 30.2 G/DL (ref 32–36)
MCV RBC AUTO: 84 FL (ref 82–98)
MICROSCOPIC COMMENT: NORMAL
MONOCYTES # BLD AUTO: 0.6 K/UL (ref 0.3–1)
MONOCYTES NFR BLD: 7.3 % (ref 4–15)
NEUTROPHILS # BLD AUTO: 4.9 K/UL (ref 1.8–7.7)
NEUTROPHILS NFR BLD: 63.1 % (ref 38–73)
NRBC BLD-RTO: 0 /100 WBC
PHOSPHATE SERPL-MCNC: 3.9 MG/DL (ref 2.7–4.5)
PLATELET # BLD AUTO: 298 K/UL (ref 150–450)
PMV BLD AUTO: 10.8 FL (ref 9.2–12.9)
POTASSIUM SERPL-SCNC: 4 MMOL/L (ref 3.5–5.1)
RBC # BLD AUTO: 3.75 M/UL (ref 4–5.4)
RBC #/AREA URNS AUTO: 1 /HPF (ref 0–4)
SATURATED IRON: 10 % (ref 20–50)
SODIUM SERPL-SCNC: 144 MMOL/L (ref 136–145)
SQUAMOUS #/AREA URNS AUTO: 0 /HPF
TOTAL IRON BINDING CAPACITY: 366 UG/DL (ref 250–450)
TRANSFERRIN SERPL-MCNC: 247 MG/DL (ref 200–375)
URATE SERPL-MCNC: 6.6 MG/DL (ref 2.4–5.7)
WBC # BLD AUTO: 7.69 K/UL (ref 3.9–12.7)
WBC #/AREA URNS AUTO: 1 /HPF (ref 0–5)

## 2024-01-22 PROCEDURE — 83036 HEMOGLOBIN GLYCOSYLATED A1C: CPT | Performed by: PHYSICIAN ASSISTANT

## 2024-01-22 PROCEDURE — 81001 URINALYSIS AUTO W/SCOPE: CPT | Performed by: INTERNAL MEDICINE

## 2024-01-22 PROCEDURE — 84439 ASSAY OF FREE THYROXINE: CPT | Performed by: PHYSICIAN ASSISTANT

## 2024-01-22 PROCEDURE — 36415 COLL VENOUS BLD VENIPUNCTURE: CPT | Mod: PN | Performed by: PHYSICIAN ASSISTANT

## 2024-01-22 PROCEDURE — 80053 COMPREHEN METABOLIC PANEL: CPT | Performed by: PHYSICIAN ASSISTANT

## 2024-01-22 PROCEDURE — 80069 RENAL FUNCTION PANEL: CPT | Performed by: INTERNAL MEDICINE

## 2024-01-22 PROCEDURE — 80061 LIPID PANEL: CPT | Performed by: PHYSICIAN ASSISTANT

## 2024-01-22 PROCEDURE — 84156 ASSAY OF PROTEIN URINE: CPT | Performed by: INTERNAL MEDICINE

## 2024-01-22 PROCEDURE — 83735 ASSAY OF MAGNESIUM: CPT | Performed by: INTERNAL MEDICINE

## 2024-01-22 PROCEDURE — 82652 VIT D 1 25-DIHYDROXY: CPT | Performed by: INTERNAL MEDICINE

## 2024-01-22 PROCEDURE — 83970 ASSAY OF PARATHORMONE: CPT | Performed by: INTERNAL MEDICINE

## 2024-01-22 PROCEDURE — 83540 ASSAY OF IRON: CPT | Performed by: INTERNAL MEDICINE

## 2024-01-22 PROCEDURE — 84550 ASSAY OF BLOOD/URIC ACID: CPT | Performed by: INTERNAL MEDICINE

## 2024-01-22 PROCEDURE — 85025 COMPLETE CBC W/AUTO DIFF WBC: CPT | Performed by: INTERNAL MEDICINE

## 2024-01-22 PROCEDURE — 82728 ASSAY OF FERRITIN: CPT | Performed by: INTERNAL MEDICINE

## 2024-01-22 PROCEDURE — 84443 ASSAY THYROID STIM HORMONE: CPT | Performed by: PHYSICIAN ASSISTANT

## 2024-01-23 LAB
ALBUMIN SERPL BCP-MCNC: 3.2 G/DL (ref 3.5–5.2)
ALP SERPL-CCNC: 116 U/L (ref 55–135)
ALT SERPL W/O P-5'-P-CCNC: 15 U/L (ref 10–44)
ANION GAP SERPL CALC-SCNC: 10 MMOL/L (ref 8–16)
AST SERPL-CCNC: 18 U/L (ref 10–40)
BILIRUB SERPL-MCNC: 0.4 MG/DL (ref 0.1–1)
BUN SERPL-MCNC: 72 MG/DL (ref 8–23)
CALCIUM SERPL-MCNC: 9.3 MG/DL (ref 8.7–10.5)
CHLORIDE SERPL-SCNC: 107 MMOL/L (ref 95–110)
CHOLEST SERPL-MCNC: 120 MG/DL (ref 120–199)
CHOLEST/HDLC SERPL: 3 {RATIO} (ref 2–5)
CO2 SERPL-SCNC: 26 MMOL/L (ref 23–29)
CREAT SERPL-MCNC: 3.6 MG/DL (ref 0.5–1.4)
CREAT UR-MCNC: 89 MG/DL (ref 15–325)
EST. GFR  (NO RACE VARIABLE): 12.9 ML/MIN/1.73 M^2
GLUCOSE SERPL-MCNC: 96 MG/DL (ref 70–110)
HDLC SERPL-MCNC: 40 MG/DL (ref 40–75)
HDLC SERPL: 33.3 % (ref 20–50)
LDLC SERPL CALC-MCNC: 65.8 MG/DL (ref 63–159)
NONHDLC SERPL-MCNC: 80 MG/DL
POTASSIUM SERPL-SCNC: 4.2 MMOL/L (ref 3.5–5.1)
PROT SERPL-MCNC: 7.2 G/DL (ref 6–8.4)
PROT UR-MCNC: 34 MG/DL (ref 0–15)
PROT/CREAT UR: 0.38 MG/G{CREAT} (ref 0–0.2)
PTH-INTACT SERPL-MCNC: 506.7 PG/ML (ref 9–77)
SODIUM SERPL-SCNC: 143 MMOL/L (ref 136–145)
T4 FREE SERPL-MCNC: 0.95 NG/DL (ref 0.71–1.51)
TRIGL SERPL-MCNC: 71 MG/DL (ref 30–150)
TSH SERPL DL<=0.005 MIU/L-ACNC: 0.93 UIU/ML (ref 0.4–4)

## 2024-01-25 LAB — 1,25(OH)2D3 SERPL-MCNC: 49 PG/ML (ref 20–79)

## 2024-01-27 DIAGNOSIS — E78.5 HYPERLIPIDEMIA, UNSPECIFIED HYPERLIPIDEMIA TYPE: ICD-10-CM

## 2024-01-29 RX ORDER — ALIROCUMAB 75 MG/ML
75 INJECTION, SOLUTION SUBCUTANEOUS
Qty: 6 ML | Refills: 3 | Status: SHIPPED | OUTPATIENT
Start: 2024-01-29 | End: 2024-03-01

## 2024-01-31 ENCOUNTER — OFFICE VISIT (OUTPATIENT)
Dept: ENDOCRINOLOGY | Facility: CLINIC | Age: 74
End: 2024-01-31
Payer: MEDICARE

## 2024-01-31 VITALS
HEART RATE: 66 BPM | DIASTOLIC BLOOD PRESSURE: 90 MMHG | HEIGHT: 67 IN | BODY MASS INDEX: 45.64 KG/M2 | SYSTOLIC BLOOD PRESSURE: 140 MMHG | WEIGHT: 290.81 LBS | OXYGEN SATURATION: 94 % | TEMPERATURE: 98 F

## 2024-01-31 DIAGNOSIS — M10.9 GOUT, UNSPECIFIED CAUSE, UNSPECIFIED CHRONICITY, UNSPECIFIED SITE: ICD-10-CM

## 2024-01-31 DIAGNOSIS — E04.1 NODULAR THYROID DISEASE: Primary | ICD-10-CM

## 2024-01-31 DIAGNOSIS — E03.9 HYPOTHYROIDISM, UNSPECIFIED TYPE: ICD-10-CM

## 2024-01-31 DIAGNOSIS — E11.65 TYPE 2 DIABETES MELLITUS WITH HYPERGLYCEMIA, WITHOUT LONG-TERM CURRENT USE OF INSULIN: ICD-10-CM

## 2024-01-31 DIAGNOSIS — G47.33 OSA (OBSTRUCTIVE SLEEP APNEA): ICD-10-CM

## 2024-01-31 DIAGNOSIS — N25.81 SECONDARY RENAL HYPERPARATHYROIDISM: ICD-10-CM

## 2024-01-31 DIAGNOSIS — E55.9 HYPOVITAMINOSIS D: ICD-10-CM

## 2024-01-31 DIAGNOSIS — Z78.0 POSTMENOPAUSAL: ICD-10-CM

## 2024-01-31 DIAGNOSIS — E66.01 OBESITY, CLASS III, BMI 40-49.9 (MORBID OBESITY): ICD-10-CM

## 2024-01-31 PROCEDURE — 99215 OFFICE O/P EST HI 40 MIN: CPT | Mod: PBBFAC,PO | Performed by: PHYSICIAN ASSISTANT

## 2024-01-31 PROCEDURE — 99999 PR PBB SHADOW E&M-EST. PATIENT-LVL V: CPT | Mod: PBBFAC,,, | Performed by: PHYSICIAN ASSISTANT

## 2024-01-31 PROCEDURE — 99213 OFFICE O/P EST LOW 20 MIN: CPT | Mod: S$PBB,,, | Performed by: PHYSICIAN ASSISTANT

## 2024-01-31 NOTE — PROGRESS NOTES
CC: DM/thyroid nodule/hypothyroidism    HPI: Juhi Taylor was diagnosed with Type 2 DM ~15 years ago. No hospitalizations for DM. Her daughter has DM. No fhx of thyroid disease.     Pt was on Mounjaro but had reflux and constipation.    Hyperparathyroidism.   No n/v.  Occ abdominal pain. No vomiting. Kidney stones in . She had a CT in  that was apparently normal.    CURRENT DIABETIC MEDS: Glipizide 5 mg qd    Fastin-160    Hypoglycemia: None  Type of Glucose Meter: one touch verio    Physical Activity: None. Plans to ride a stationary bike every morning.    Dietary Habits: 1-2 meal daily. Drinks water.     Last Eye Exam:  Dr. De La Fuente  Last Podiatry Exam:  Dr. Nicholson     Last DM Education Attended: Last year    Thyroid nodule  Dx 5-10 years ago  Bx in  of left thyroid nodule was benign  Thyroid u/s  shows a 3 cm nodule in the left lobe which has grown since the previous u/s. Right lobe shows a 1.7 cm nodule. FNA on the rt nodule could not be reached.   No new voice changes, sob, dysphagia.     Hypothyroidism  Dx 10 years ago  Stopped LT4 since .  +  hair loss (hereditary), brittle nails, occ palpitations,.   No insomnia, constipation/diarrhea.     NICOLE-wears cpap    Social Hx: smoked 1 ppd for 40 years. She quit 2 years ago.  No ETOH use.     DEXA : Osteopenia. Taking 2000 IU vd daily. Follows w/ urology.     Wt Readings from Last 11 Encounters:   24 131.9 kg (290 lb 12.6 oz)   11/15/23 130.7 kg (288 lb 4 oz)   09/15/23 131.1 kg (288 lb 14.6 oz)   23 128.7 kg (283 lb 10 oz)   23 128.7 kg (283 lb 11.7 oz)   07/10/23 126.9 kg (279 lb 10.5 oz)   23 127.8 kg (281 lb 11.2 oz)   03/10/23 127.8 kg (281 lb 12 oz)   23 131.6 kg (290 lb 3.2 oz)   22 131.5 kg (290 lb)   22 131.5 kg (290 lb)      REVIEW OF SYSTEMS,  General: no weakness or fatigue, + wt loss (11 lbs).   Eyes: no intermittent blurry vision or visual disturbances.   Cardiac: +  "leg swelling, no chest pain or palpitations.   Respiratory: no cough or dyspnea.   GI: no abdominal pain or nausea.   Skin: no rashes or itching.   Neuro: no numbness or tingling.   Musc: + back pain, leg pain  Endocrine: no polyuria, polydipsia, polyphagia.   Remainder ROS negative    Vital Signs  BP (!) 140/90 (BP Location: Right arm, Patient Position: Sitting, BP Method: Large (Manual))   Pulse 66   Temp 98.1 °F (36.7 °C) (Oral)   Ht 5' 7" (1.702 m)   Wt 131.9 kg (290 lb 12.6 oz)   SpO2 (!) 94%   BMI 45.54 kg/m²     Personally reviewed labs below with pt:     Hemoglobin A1C   Date Value Ref Range Status   01/22/2024 5.4 4.0 - 5.6 % Final     Comment:     ADA Screening Guidelines:  5.7-6.4%  Consistent with prediabetes  >or=6.5%  Consistent with diabetes    High levels of fetal hemoglobin interfere with the HbA1C  assay. Heterozygous hemoglobin variants (HbS, HgC, etc)do  not significantly interfere with this assay.   However, presence of multiple variants may affect accuracy.     07/05/2023 5.6 4.0 - 5.6 % Final     Comment:     ADA Screening Guidelines:  5.7-6.4%  Consistent with prediabetes  >or=6.5%  Consistent with diabetes    High levels of fetal hemoglobin interfere with the HbA1C  assay. Heterozygous hemoglobin variants (HbS, HgC, etc)do  not significantly interfere with this assay.   However, presence of multiple variants may affect accuracy.     04/14/2023 6.1 (H) 4.0 - 5.6 % Final     Comment:     ADA Screening Guidelines:  5.7-6.4%  Consistent with prediabetes  >or=6.5%  Consistent with diabetes    High levels of fetal hemoglobin interfere with the HbA1C  assay. Heterozygous hemoglobin variants (HbS, HgC, etc)do  not significantly interfere with this assay.   However, presence of multiple variants may affect accuracy.         Chemistry        Component Value Date/Time     01/22/2024 1133     01/22/2024 1133    K 4.2 01/22/2024 1133    K 4.0 01/22/2024 1133     01/22/2024 1133    "  01/22/2024 1133    CO2 26 01/22/2024 1133    CO2 25 01/22/2024 1133    BUN 72 (H) 01/22/2024 1133    BUN 67 (H) 01/22/2024 1133    CREATININE 3.6 (H) 01/22/2024 1133    CREATININE 3.2 (H) 01/22/2024 1133    GLU 96 01/22/2024 1133    GLU 99 01/22/2024 1133        Component Value Date/Time    CALCIUM 9.3 01/22/2024 1133    CALCIUM 9.2 01/22/2024 1133    ALKPHOS 116 01/22/2024 1133    AST 18 01/22/2024 1133    ALT 15 01/22/2024 1133    BILITOT 0.4 01/22/2024 1133    ESTGFRAFRICA 16.2 (A) 07/15/2022 1038    EGFRNONAA 14.0 (A) 07/15/2022 1038        Lab Results   Component Value Date    CHOL 120 01/22/2024    CHOL 150 04/14/2023    CHOL 135 10/10/2022     Lab Results   Component Value Date    HDL 40 01/22/2024    HDL 39 (L) 04/14/2023    HDL 39 (L) 10/10/2022     Lab Results   Component Value Date    LDLCALC 65.8 01/22/2024    LDLCALC 94.0 04/14/2023    LDLCALC 70.8 10/10/2022     Lab Results   Component Value Date    TRIG 71 01/22/2024    TRIG 85 04/14/2023    TRIG 126 10/10/2022     Lab Results   Component Value Date    CHOLHDL 33.3 01/22/2024    CHOLHDL 26.0 04/14/2023    CHOLHDL 28.9 10/10/2022     Lab Results   Component Value Date    TSH 0.930 01/22/2024     2016 u/s      2018 below      Lab Results   Component Value Date    MICALBCREAT 306.9 (H) 07/14/2023     Vit D, 25-Hydroxy   Date Value Ref Range Status   12/28/2022 26 (L) 30 - 96 ng/mL Final     Comment:     Vitamin D deficiency.........<10 ng/mL                              Vitamin D insufficiency......10-29 ng/mL       Vitamin D sufficiency........> or equal to 30 ng/mL  Vitamin D toxicity............>100 ng/mL       EXAMINATION:  US SOFT TISSUE HEAD NECK THYROID     CLINICAL HISTORY:  Type 2 diabetes mellitus with hyperglycemia     TECHNIQUE:  Ultrasound of the thyroid and cervical lymph nodes was performed.     COMPARISON:  06/15/2020     FINDINGS:  The right hemithyroid measures 6.0 x 2.1 x 2.2 cm and the left 7.2 x 2.6 x 2.5 cm.  The thyroid  isthmus measures 5 mm.  Total thyroid volume is 40 cc.     Multiple nodules are present bilaterally.  The largest on the right is a lower pole solid isoechoic nodule measuring 1.0 cm..  The largest on the left is a solid hypoechoic solid nodule measuring 2.6 cm, increased from 2.2 cm.  There is a circumscribed solid isoechoic nodule of the isthmus measuring 1.6 cm, not imaged previously.     No evidence for cervical lymphadenopathy.     Impression:     Enlarged, multinodular thyroid gland.  Lower pole nodule on the left has increased in size and meets criteria for fine-needle aspiration if not previously performed.        Electronically signed by: Haider Brar MD  Date:                                            11/21/2022  Time:                                           15:02    PHYSICAL EXAMINATION  Constitutional: middle aged female, appears well, no distress  Respiratory: even and unlabored,   Neuro: patient alert and cooperative; CN 2-12 grossly intact  Psych: normal mood and affect  1/24  Foot Exam: no sores or macerations noted.     Protective Sensation (w/ 10 gram monofilament):  Right: Intact  Left: Intact    Visual Inspection:  Normal -  Bilateral, Nails Intact - without Evidence of Foot Deformity- Bilateral and Dry Skin -  Bilateral    Pedal Pulses:   Right: Present  Left: Present    Posterior tibialis:   Right:Present  Left: Present     Vibratory Sensation  Right:Positive  Left:Positive     Assessment/Plan    1. Nodular thyroid disease  US FNA Biopsy w/ Imaging 1st Lesion    TSH    US Soft Tissue Head Neck Thyroid      2. Hypothyroidism, unspecified type        3. Type 2 diabetes mellitus with hyperglycemia, without long-term current use of insulin  Hemoglobin A1C    T4, Free    Renal Function Panel      4. Secondary renal hyperparathyroidism        5. Hypovitaminosis D  Vitamin D      6. NICOLE (obstructive sleep apnea)        7. Postmenopausal  DXA Bone Density Axial Skeleton 1 or more sites      8.  Gout, unspecified cause, unspecified chronicity, unspecified site        9. Obesity, Class III, BMI 40-49.9 (morbid obesity)           Thyroid nodule-left nodule has grown. Repeat FNA.   Hypothyroidism-resolved-monitor  E5MT-H3r is below goal. Continue Glipizide.   RPG-qxgevc-ypdslryy Lisinopril 40 mg daily.   HLD-stable-statin intolerant. Continue Praulent 75 mg q 2 weeks. Recheck LP.  Hypovitaminosis D- low- Start 2000 IU of vd daily.  SWW-volcen-oqzdewcf CPAP  Secondary renal Hyperparathryoidism-stable-monitor. Avoid calcium supplements. Increase intake of water. Calcium is normal. F/u w/ nephrology.  Gout-elevated-continue allopurinol. Increase intake of water. Recheck next visit.   Obesity-increase exercise to 30 min daily.    FOLLOW UP    FNA -Tori  11/24  w/ labs dexa and u/s

## 2024-02-05 DIAGNOSIS — M10.9 GOUT, UNSPECIFIED CAUSE, UNSPECIFIED CHRONICITY, UNSPECIFIED SITE: ICD-10-CM

## 2024-02-05 RX ORDER — ALLOPURINOL 300 MG/1
300 TABLET ORAL DAILY
Qty: 90 TABLET | Refills: 3 | Status: ON HOLD | OUTPATIENT
Start: 2024-02-05

## 2024-02-27 ENCOUNTER — PATIENT MESSAGE (OUTPATIENT)
Dept: ENDOCRINOLOGY | Facility: CLINIC | Age: 74
End: 2024-02-27
Payer: MEDICARE

## 2024-03-01 ENCOUNTER — TELEPHONE (OUTPATIENT)
Dept: ENDOCRINOLOGY | Facility: CLINIC | Age: 74
End: 2024-03-01
Payer: MEDICARE

## 2024-03-01 DIAGNOSIS — E78.5 HYPERLIPIDEMIA, UNSPECIFIED HYPERLIPIDEMIA TYPE: Primary | ICD-10-CM

## 2024-03-01 DIAGNOSIS — E04.1 NODULAR THYROID DISEASE: ICD-10-CM

## 2024-03-01 RX ORDER — EVOLOCUMAB 140 MG/ML
140 INJECTION, SOLUTION SUBCUTANEOUS
Qty: 2 ML | Refills: 11 | Status: ON HOLD | OUTPATIENT
Start: 2024-03-01

## 2024-03-14 ENCOUNTER — HOSPITAL ENCOUNTER (INPATIENT)
Facility: HOSPITAL | Age: 74
LOS: 3 days | Discharge: HOME | DRG: 291 | End: 2024-03-18
Attending: EMERGENCY MEDICINE | Admitting: INTERNAL MEDICINE
Payer: MEDICARE

## 2024-03-14 ENCOUNTER — HOSPITAL ENCOUNTER (OUTPATIENT)
Dept: RADIOLOGY | Facility: HOSPITAL | Age: 74
Discharge: HOME | DRG: 291 | End: 2024-03-14
Attending: PHYSICIAN ASSISTANT
Payer: MEDICARE

## 2024-03-14 DIAGNOSIS — I10 HYPERTENSION, UNSPECIFIED TYPE: ICD-10-CM

## 2024-03-14 DIAGNOSIS — R06.02 SHORTNESS OF BREATH: ICD-10-CM

## 2024-03-14 DIAGNOSIS — I50.43 ACUTE ON CHRONIC COMBINED SYSTOLIC AND DIASTOLIC CONGESTIVE HEART FAILURE: Primary | ICD-10-CM

## 2024-03-14 DIAGNOSIS — M79.89 LEG SWELLING: ICD-10-CM

## 2024-03-14 DIAGNOSIS — R06.02 SOB (SHORTNESS OF BREATH): ICD-10-CM

## 2024-03-14 DIAGNOSIS — E04.1 NODULAR THYROID DISEASE: ICD-10-CM

## 2024-03-14 LAB
ALBUMIN SERPL BCP-MCNC: 3.6 G/DL (ref 3.5–5.2)
ALP SERPL-CCNC: 104 U/L (ref 55–135)
ALT SERPL W/O P-5'-P-CCNC: 40 U/L (ref 10–44)
ANION GAP SERPL CALC-SCNC: 7 MMOL/L (ref 8–16)
AST SERPL-CCNC: 20 U/L (ref 10–40)
BACTERIA #/AREA URNS HPF: NORMAL /HPF
BASOPHILS # BLD AUTO: 0.03 K/UL (ref 0–0.2)
BASOPHILS NFR BLD: 0.3 % (ref 0–1.9)
BILIRUB SERPL-MCNC: 0.5 MG/DL (ref 0.1–1)
BILIRUB UR QL STRIP: NEGATIVE
BNP SERPL-MCNC: 1867 PG/ML (ref 0–99)
BUN SERPL-MCNC: 76 MG/DL (ref 8–23)
CALCIUM SERPL-MCNC: 9.8 MG/DL (ref 8.7–10.5)
CHLORIDE SERPL-SCNC: 111 MMOL/L (ref 95–110)
CLARITY UR: CLEAR
CO2 SERPL-SCNC: 25 MMOL/L (ref 23–29)
COLOR UR: YELLOW
CREAT SERPL-MCNC: 3.8 MG/DL (ref 0.5–1.4)
DIFFERENTIAL METHOD BLD: ABNORMAL
EOSINOPHIL # BLD AUTO: 0.2 K/UL (ref 0–0.5)
EOSINOPHIL NFR BLD: 2.5 % (ref 0–8)
ERYTHROCYTE [DISTWIDTH] IN BLOOD BY AUTOMATED COUNT: 17.6 % (ref 11.5–14.5)
EST. GFR  (NO RACE VARIABLE): 12.1 ML/MIN/1.73 M^2
GLUCOSE SERPL-MCNC: 205 MG/DL (ref 70–110)
GLUCOSE UR QL STRIP: NEGATIVE
HCT VFR BLD AUTO: 38.2 % (ref 37–48.5)
HGB BLD-MCNC: 10.8 G/DL (ref 12–16)
HGB UR QL STRIP: ABNORMAL
HYALINE CASTS #/AREA URNS LPF: 0 /LPF
IMM GRANULOCYTES # BLD AUTO: 0.03 K/UL (ref 0–0.04)
IMM GRANULOCYTES NFR BLD AUTO: 0.3 % (ref 0–0.5)
INFLUENZA A, MOLECULAR: NEGATIVE
INFLUENZA B, MOLECULAR: NEGATIVE
KETONES UR QL STRIP: NEGATIVE
LEUKOCYTE ESTERASE UR QL STRIP: ABNORMAL
LYMPHOCYTES # BLD AUTO: 1.2 K/UL (ref 1–4.8)
LYMPHOCYTES NFR BLD: 13.3 % (ref 18–48)
MAGNESIUM SERPL-MCNC: 2.1 MG/DL (ref 1.6–2.6)
MCH RBC QN AUTO: 24.4 PG (ref 27–31)
MCHC RBC AUTO-ENTMCNC: 28.3 G/DL (ref 32–36)
MCV RBC AUTO: 86 FL (ref 82–98)
MICROSCOPIC COMMENT: NORMAL
MONOCYTES # BLD AUTO: 0.4 K/UL (ref 0.3–1)
MONOCYTES NFR BLD: 4.9 % (ref 4–15)
NEUTROPHILS # BLD AUTO: 6.8 K/UL (ref 1.8–7.7)
NEUTROPHILS NFR BLD: 78.7 % (ref 38–73)
NITRITE UR QL STRIP: NEGATIVE
NRBC BLD-RTO: 0 /100 WBC
PH UR STRIP: 6 [PH] (ref 5–8)
PLATELET # BLD AUTO: 247 K/UL (ref 150–450)
PMV BLD AUTO: 9.7 FL (ref 9.2–12.9)
POTASSIUM SERPL-SCNC: 4.7 MMOL/L (ref 3.5–5.1)
PROT SERPL-MCNC: 6.6 G/DL (ref 6–8.4)
PROT UR QL STRIP: ABNORMAL
RBC # BLD AUTO: 4.42 M/UL (ref 4–5.4)
RBC #/AREA URNS HPF: 3 /HPF (ref 0–4)
SARS-COV-2 RDRP RESP QL NAA+PROBE: NEGATIVE
SODIUM SERPL-SCNC: 143 MMOL/L (ref 136–145)
SP GR UR STRIP: 1.01 (ref 1–1.03)
SPECIMEN SOURCE: NORMAL
SQUAMOUS #/AREA URNS HPF: 2 /HPF
TROPONIN I SERPL HS-MCNC: 34.8 PG/ML (ref 0–14.9)
TROPONIN I SERPL HS-MCNC: 38.4 PG/ML (ref 0–14.9)
URN SPEC COLLECT METH UR: ABNORMAL
UROBILINOGEN UR STRIP-ACNC: NEGATIVE EU/DL
WBC # BLD AUTO: 8.63 K/UL (ref 3.9–12.7)
WBC #/AREA URNS HPF: 5 /HPF (ref 0–5)

## 2024-03-14 PROCEDURE — 96374 THER/PROPH/DIAG INJ IV PUSH: CPT

## 2024-03-14 PROCEDURE — 83880 ASSAY OF NATRIURETIC PEPTIDE: CPT | Performed by: PHYSICIAN ASSISTANT

## 2024-03-14 PROCEDURE — 93005 ELECTROCARDIOGRAM TRACING: CPT | Performed by: GENERAL PRACTICE

## 2024-03-14 PROCEDURE — 0GBH3ZX EXCISION OF RIGHT THYROID GLAND LOBE, PERCUTANEOUS APPROACH, DIAGNOSTIC: ICD-10-PCS | Performed by: RADIOLOGY

## 2024-03-14 PROCEDURE — U0002 COVID-19 LAB TEST NON-CDC: HCPCS | Performed by: EMERGENCY MEDICINE

## 2024-03-14 PROCEDURE — 10006 FNA BX W/US GDN EA ADDL: CPT | Mod: LT,,, | Performed by: RADIOLOGY

## 2024-03-14 PROCEDURE — 93010 ELECTROCARDIOGRAM REPORT: CPT | Mod: ,,, | Performed by: GENERAL PRACTICE

## 2024-03-14 PROCEDURE — 87502 INFLUENZA DNA AMP PROBE: CPT | Performed by: EMERGENCY MEDICINE

## 2024-03-14 PROCEDURE — 0GBG3ZX EXCISION OF LEFT THYROID GLAND LOBE, PERCUTANEOUS APPROACH, DIAGNOSTIC: ICD-10-PCS | Performed by: RADIOLOGY

## 2024-03-14 PROCEDURE — 84484 ASSAY OF TROPONIN QUANT: CPT | Mod: 91 | Performed by: PHYSICIAN ASSISTANT

## 2024-03-14 PROCEDURE — 84484 ASSAY OF TROPONIN QUANT: CPT | Performed by: NURSE PRACTITIONER

## 2024-03-14 PROCEDURE — 88173 CYTOPATH EVAL FNA REPORT: CPT | Mod: TC,59 | Performed by: PATHOLOGY

## 2024-03-14 PROCEDURE — 85025 COMPLETE CBC W/AUTO DIFF WBC: CPT | Performed by: PHYSICIAN ASSISTANT

## 2024-03-14 PROCEDURE — 99291 CRITICAL CARE FIRST HOUR: CPT

## 2024-03-14 PROCEDURE — 10005 FNA BX W/US GDN 1ST LES: CPT | Mod: RT,,, | Performed by: RADIOLOGY

## 2024-03-14 PROCEDURE — 83735 ASSAY OF MAGNESIUM: CPT | Performed by: EMERGENCY MEDICINE

## 2024-03-14 PROCEDURE — 81001 URINALYSIS AUTO W/SCOPE: CPT | Performed by: NURSE PRACTITIONER

## 2024-03-14 PROCEDURE — 63600175 PHARM REV CODE 636 W HCPCS: Performed by: NURSE PRACTITIONER

## 2024-03-14 PROCEDURE — 10006 FNA BX W/US GDN EA ADDL: CPT

## 2024-03-14 PROCEDURE — 10005 FNA BX W/US GDN 1ST LES: CPT

## 2024-03-14 PROCEDURE — 25000003 PHARM REV CODE 250: Performed by: NURSE PRACTITIONER

## 2024-03-14 PROCEDURE — 80053 COMPREHEN METABOLIC PANEL: CPT | Performed by: PHYSICIAN ASSISTANT

## 2024-03-14 RX ORDER — HYDRALAZINE HYDROCHLORIDE 20 MG/ML
10 INJECTION INTRAMUSCULAR; INTRAVENOUS
Status: DISCONTINUED | OUTPATIENT
Start: 2024-03-14 | End: 2024-03-15

## 2024-03-14 RX ORDER — PNV NO.95/FERROUS FUM/FOLIC AC 28MG-0.8MG
100 TABLET ORAL DAILY
Status: ON HOLD | COMMUNITY

## 2024-03-14 RX ORDER — CLONIDINE HYDROCHLORIDE 0.1 MG/1
0.1 TABLET ORAL
Status: COMPLETED | OUTPATIENT
Start: 2024-03-14 | End: 2024-03-14

## 2024-03-14 RX ORDER — CALCITRIOL 0.5 UG/1
0.5 CAPSULE ORAL DAILY
Status: ON HOLD | COMMUNITY
Start: 2024-02-19

## 2024-03-14 RX ORDER — FUROSEMIDE 10 MG/ML
40 INJECTION INTRAMUSCULAR; INTRAVENOUS
Status: COMPLETED | OUTPATIENT
Start: 2024-03-14 | End: 2024-03-14

## 2024-03-14 RX ADMIN — FUROSEMIDE 40 MG: 10 INJECTION, SOLUTION INTRAMUSCULAR; INTRAVENOUS at 10:03

## 2024-03-14 RX ADMIN — CLONIDINE HYDROCHLORIDE 0.1 MG: 0.1 TABLET ORAL at 10:03

## 2024-03-14 RX ADMIN — NITROGLYCERIN 1 INCH: 20 OINTMENT TOPICAL at 11:03

## 2024-03-14 NOTE — FIRST PROVIDER EVALUATION
Emergency Department TeleTriage Encounter Note      CHIEF COMPLAINT    Chief Complaint   Patient presents with    Shortness of Breath    Leg Swelling       VITAL SIGNS   Initial Vitals [03/14/24 1640]   BP Pulse Resp Temp SpO2   (!) 170/90 65 (!) 24 98.7 °F (37.1 °C) (!) 93 %      MAP       --            ALLERGIES    Review of patient's allergies indicates:   Allergen Reactions    Percocet [oxycodone-acetaminophen] Itching    Amiodarone analogues      Itching      Irbesartan Swelling    Januvia [sitagliptin]     Jardiance [empagliflozin]      Leg cramps    Lipitor [atorvastatin] Other (See Comments)     Severe leg pain    Linaclotide Other (See Comments) and Nausea And Vomiting     Does not remember    Lubiprostone Other (See Comments) and Palpitations     Does not remember       PROVIDER TRIAGE NOTE  Patient presents with bilateral leg swelling and shortness of breath. She has been compliant with medications.       ORDERS  Labs Reviewed - No data to display    ED Orders (720h ago, onward)      None              Virtual Visit Note: The provider triage portion of this emergency department evaluation and documentation was performed via [employer], a HIPAA-compliant telemedicine application, in concert with a tele-presenter in the room. A face to face patient evaluation with one of my colleagues will occur once the patient is placed in an emergency department room.      DISCLAIMER: This note was prepared with [employer] voice recognition transcription software. Garbled syntax, mangled pronouns, and other bizarre constructions may be attributed to that software system.

## 2024-03-14 NOTE — Clinical Note
Diagnosis: Acute on chronic combined systolic and diastolic congestive heart failure [709014]   Future Attending Provider: SURAJ YOUNGBLOOD [[id number]]   [facility] [[id number]]   Special Needs:: No Special Needs [1]

## 2024-03-15 PROBLEM — N18.4 CKD (CHRONIC KIDNEY DISEASE), STAGE IV: Status: ACTIVE | Noted: 2024-03-15

## 2024-03-15 LAB
ANION GAP SERPL CALC-SCNC: 6 MMOL/L (ref 8–16)
BUN SERPL-MCNC: 80 MG/DL (ref 8–23)
CALCIUM SERPL-MCNC: 10 MG/DL (ref 8.7–10.5)
CHLORIDE SERPL-SCNC: 111 MMOL/L (ref 95–110)
CO2 SERPL-SCNC: 27 MMOL/L (ref 23–29)
CREAT SERPL-MCNC: 3.5 MG/DL (ref 0.5–1.4)
EST. GFR  (NO RACE VARIABLE): 13.4 ML/MIN/1.73 M^2
GLUCOSE SERPL-MCNC: 145 MG/DL (ref 70–110)
MAGNESIUM SERPL-MCNC: 2.3 MG/DL (ref 1.6–2.6)
POTASSIUM SERPL-SCNC: 4.6 MMOL/L (ref 3.5–5.1)
SODIUM SERPL-SCNC: 144 MMOL/L (ref 136–145)

## 2024-03-15 PROCEDURE — 25000003 PHARM REV CODE 250: Performed by: HOSPITALIST

## 2024-03-15 PROCEDURE — 25000003 PHARM REV CODE 250: Performed by: INTERNAL MEDICINE

## 2024-03-15 PROCEDURE — 96375 TX/PRO/DX INJ NEW DRUG ADDON: CPT

## 2024-03-15 PROCEDURE — 96376 TX/PRO/DX INJ SAME DRUG ADON: CPT

## 2024-03-15 PROCEDURE — 27000221 HC OXYGEN, UP TO 24 HOURS

## 2024-03-15 PROCEDURE — 63600175 PHARM REV CODE 636 W HCPCS: Performed by: INTERNAL MEDICINE

## 2024-03-15 PROCEDURE — 94761 N-INVAS EAR/PLS OXIMETRY MLT: CPT

## 2024-03-15 PROCEDURE — 12000002 HC ACUTE/MED SURGE SEMI-PRIVATE ROOM

## 2024-03-15 PROCEDURE — 83735 ASSAY OF MAGNESIUM: CPT | Performed by: INTERNAL MEDICINE

## 2024-03-15 PROCEDURE — 80048 BASIC METABOLIC PNL TOTAL CA: CPT | Performed by: INTERNAL MEDICINE

## 2024-03-15 PROCEDURE — 99900031 HC PATIENT EDUCATION (STAT)

## 2024-03-15 RX ORDER — CALCITRIOL 0.25 UG/1
0.5 CAPSULE ORAL DAILY
Status: DISCONTINUED | OUTPATIENT
Start: 2024-03-15 | End: 2024-03-18 | Stop reason: HOSPADM

## 2024-03-15 RX ORDER — LANOLIN ALCOHOL/MO/W.PET/CERES
400 CREAM (GRAM) TOPICAL 2 TIMES DAILY PRN
Status: DISCONTINUED | OUTPATIENT
Start: 2024-03-15 | End: 2024-03-18 | Stop reason: HOSPADM

## 2024-03-15 RX ORDER — FUROSEMIDE 10 MG/ML
40 INJECTION INTRAMUSCULAR; INTRAVENOUS EVERY 8 HOURS
Status: DISCONTINUED | OUTPATIENT
Start: 2024-03-15 | End: 2024-03-15

## 2024-03-15 RX ORDER — CARVEDILOL 25 MG/1
25 TABLET ORAL 2 TIMES DAILY
Status: DISCONTINUED | OUTPATIENT
Start: 2024-03-15 | End: 2024-03-18 | Stop reason: HOSPADM

## 2024-03-15 RX ORDER — METOLAZONE 2.5 MG/1
5 TABLET ORAL ONCE
Status: COMPLETED | OUTPATIENT
Start: 2024-03-15 | End: 2024-03-15

## 2024-03-15 RX ORDER — CLONIDINE 0.1 MG/24H
1 PATCH, EXTENDED RELEASE TRANSDERMAL
Status: DISCONTINUED | OUTPATIENT
Start: 2024-03-15 | End: 2024-03-18 | Stop reason: HOSPADM

## 2024-03-15 RX ORDER — FUROSEMIDE 10 MG/ML
80 INJECTION INTRAMUSCULAR; INTRAVENOUS EVERY 8 HOURS
Status: DISCONTINUED | OUTPATIENT
Start: 2024-03-15 | End: 2024-03-15

## 2024-03-15 RX ORDER — HYDRALAZINE HYDROCHLORIDE 20 MG/ML
10 INJECTION INTRAMUSCULAR; INTRAVENOUS EVERY 4 HOURS PRN
Status: DISCONTINUED | OUTPATIENT
Start: 2024-03-15 | End: 2024-03-18 | Stop reason: HOSPADM

## 2024-03-15 RX ORDER — ACETAMINOPHEN 325 MG/1
650 TABLET ORAL EVERY 4 HOURS PRN
Status: DISCONTINUED | OUTPATIENT
Start: 2024-03-15 | End: 2024-03-18 | Stop reason: HOSPADM

## 2024-03-15 RX ORDER — SPIRONOLACTONE 25 MG/1
25 TABLET ORAL ONCE
Status: COMPLETED | OUTPATIENT
Start: 2024-03-15 | End: 2024-03-15

## 2024-03-15 RX ORDER — FUROSEMIDE 10 MG/ML
40 INJECTION INTRAMUSCULAR; INTRAVENOUS EVERY 8 HOURS
Status: DISCONTINUED | OUTPATIENT
Start: 2024-03-15 | End: 2024-03-16

## 2024-03-15 RX ORDER — ONDANSETRON HYDROCHLORIDE 2 MG/ML
4 INJECTION, SOLUTION INTRAVENOUS EVERY 8 HOURS PRN
Status: DISCONTINUED | OUTPATIENT
Start: 2024-03-15 | End: 2024-03-18 | Stop reason: HOSPADM

## 2024-03-15 RX ORDER — SODIUM CHLORIDE 0.9 % (FLUSH) 0.9 %
10 SYRINGE (ML) INJECTION
Status: DISCONTINUED | OUTPATIENT
Start: 2024-03-15 | End: 2024-03-18 | Stop reason: HOSPADM

## 2024-03-15 RX ORDER — AMLODIPINE BESYLATE 5 MG/1
10 TABLET ORAL DAILY
Status: DISCONTINUED | OUTPATIENT
Start: 2024-03-15 | End: 2024-03-18 | Stop reason: HOSPADM

## 2024-03-15 RX ADMIN — METOLAZONE 5 MG: 2.5 TABLET ORAL at 04:03

## 2024-03-15 RX ADMIN — AMLODIPINE BESYLATE 10 MG: 5 TABLET ORAL at 03:03

## 2024-03-15 RX ADMIN — APIXABAN 5 MG: 5 TABLET, FILM COATED ORAL at 08:03

## 2024-03-15 RX ADMIN — FUROSEMIDE 40 MG: 10 INJECTION, SOLUTION INTRAMUSCULAR; INTRAVENOUS at 06:03

## 2024-03-15 RX ADMIN — HYDRALAZINE HYDROCHLORIDE 10 MG: 20 INJECTION INTRAMUSCULAR; INTRAVENOUS at 04:03

## 2024-03-15 RX ADMIN — HYDRALAZINE HYDROCHLORIDE 10 MG: 20 INJECTION INTRAMUSCULAR; INTRAVENOUS at 08:03

## 2024-03-15 RX ADMIN — CARVEDILOL 25 MG: 25 TABLET, FILM COATED ORAL at 08:03

## 2024-03-15 RX ADMIN — FUROSEMIDE 40 MG: 10 INJECTION, SOLUTION INTRAMUSCULAR; INTRAVENOUS at 04:03

## 2024-03-15 RX ADMIN — Medication 400 MG: at 09:03

## 2024-03-15 RX ADMIN — CARVEDILOL 25 MG: 25 TABLET, FILM COATED ORAL at 04:03

## 2024-03-15 RX ADMIN — SODIUM ZIRCONIUM CYCLOSILICATE 10 G: 5 POWDER, FOR SUSPENSION ORAL at 04:03

## 2024-03-15 RX ADMIN — SPIRONOLACTONE 25 MG: 25 TABLET ORAL at 03:03

## 2024-03-15 RX ADMIN — CALCITRIOL CAPSULES 0.25 MCG 0.5 MCG: 0.25 CAPSULE ORAL at 08:03

## 2024-03-15 NOTE — ED NOTES
Attempted to call report, and I was not able to reach the nurse. I informed them to contact me when she is available.

## 2024-03-15 NOTE — ED NOTES
MD Petra notified of patient's refusal to receive 80mg dose of Lasix. Pt stated she only wants 40mg.

## 2024-03-15 NOTE — PROGRESS NOTES
No acute events overnight.  Patient states she is starting to feel little bit better.  We will increase Lasix given creatinine and level of volume overload.  We will do Lasix 80 mg IV b.i.d..  Monitor input output.  Monitor blood pressure.  We will add p.r.n. medications for systolics over 180.  Patient denies being on oxygen at home.      August Lambert Jr, MD

## 2024-03-15 NOTE — PHARMACY MED REC
"              .        Admission Medication History     The home medication history was taken by Carmen Barker.    You may go to "Admission" then "Reconcile Home Medications" tabs to review and/or act upon these items.     The home medication list has been updated by the Pharmacy department.   Please read ALL comments highlighted in yellow.   Please address this information as you see fit.    Feel free to contact us if you have any questions or require assistance.        Medications listed below were obtained from: Patient/family and Analytic software- [employer]  No current facility-administered medications on file prior to encounter.     Current Outpatient Medications on File Prior to Encounter   Medication Sig Dispense Refill    allopurinoL (ZYLOPRIM) 300 MG tablet Take 1 tablet (300 mg total) by mouth once daily. 90 tablet 3    amLODIPine (NORVASC) 10 MG tablet Take 10 mg by mouth once daily.      calcitRIOL (ROCALTROL) 0.5 MCG Cap Take 0.5 mcg by mouth once daily.      carvediloL (COREG) 25 MG tablet TAKE ONE TABLET BY MOUTH TWICE DAILY (Patient taking differently: Take 25 mg by mouth 2 (two) times daily.) 180 tablet 2    cloNIDine (CATAPRES) 0.1 MG tablet Take 1 tablet (0.1 mg total) by mouth 3 (three) times daily as needed (PRN SBP > 165 mmHg). 90 tablet 6    cyanocobalamin (VITAMIN B-12) 100 MCG tablet Take 100 mcg by mouth once daily.      evolocumab (REPATHA SURECLICK) 140 mg/mL PnIj Inject 1 mL (140 mg total) into the skin every 14 (fourteen) days. 2 mL 11    furosemide (LASIX) 40 MG tablet Take 40 mg by mouth once daily.      loratadine (CLARITIN) 10 mg tablet Take 10 mg by mouth once daily.      magnesium oxide (MAG-OX) 400 mg (241.3 mg magnesium) tablet Take 1 tablet (400 mg total) by mouth daily as needed (cramping). (Patient taking differently: Take 400 mg by mouth once daily.) 30 tablet 0    metOLazone (ZAROXOLYN) 2.5 MG tablet Take 2.5 mg by mouth nightly.      nitroGLYCERIN (NITROSTAT) " 0.4 MG SL tablet Place 1 tablet (0.4 mg total) under the tongue every 5 (five) minutes as needed for Chest pain. 25 tablet 0    spironolactone (ALDACTONE) 25 MG tablet Take 0.5 tablets (12.5 mg total) by mouth once daily. 30 tablet 11    apixaban (ELIQUIS) 5 mg Tab Take 1 tablet (5 mg total) by mouth 2 (two) times daily. (Patient not taking: Reported on 3/14/2024) 180 tablet 0    blood sugar diagnostic (TRUE METRIX GLUCOSE TEST STRIP) Strp Use 1x daily. Insurance preferred. 100 strip 3    blood sugar diagnostic Strp To check BG 1 time daily, to use with insurance preferred meter 100 strip 3    cloNIDine 0.1 mg/24 hr td ptwk (CATAPRES) 0.1 mg/24 hr Place 1 patch onto the skin every 7 days. (Patient taking differently: Place 1 patch onto the skin every 7 days. TUESDAYS) 4 patch 4    glipiZIDE 5 MG TR24 Take 2 tablets (10 mg total) by mouth daily with breakfast. (Patient not taking: Reported on 3/14/2024) 180 tablet 3    lancets Misc To check BG 1 times daily, to use with insurance preferred meter 100 each 3    lancets Misc To check BG 1 times daily, to use with insurance preferred meter 100 each 3    latanoprost 0.005 % ophthalmic solution Place 1 drop into both eyes every evening. (Patient not taking: Reported on 3/14/2024) 7.5 mL 3    [DISCONTINUED] aspirin (ECOTRIN) 81 MG EC tablet Take 1 tablet (81 mg total) by mouth once daily. 90 tablet 3    [DISCONTINUED] DULoxetine (CYMBALTA) 20 MG capsule TAKE ONE CAPSULE BY MOUTH ONCE DAILY 30 capsule 4    [DISCONTINUED] folic acid (FOLVITE) 1 MG tablet Take 1 mg by mouth once daily.      [DISCONTINUED] metFORMIN (GLUCOPHAGE-XR) 500 MG ER 24hr tablet Take 2 tablets (1,000 mg total) by mouth 2 (two) times daily with meals. 360 tablet 3    [DISCONTINUED] montelukast (SINGULAIR) 10 mg tablet TAKE ONE TABLET BY MOUTH EVERY EVENING FOR allergies 90 tablet 3    [DISCONTINUED] sodium bicarbonate 650 MG tablet Take 1 tablet (650 mg total) by mouth once daily. 30  tablet 11       Potential issues to be addressed PRIOR TO DISCHARGE  Patient reported not taking the following medications: (Eliquis, Glipizide & Latanoprost Eye Drops). These medications remain on the home medication list. Please address accordingly.     Carmen Barker  EXT 1922

## 2024-03-15 NOTE — HPI
73 BF with pmh of HTN ,  CKD IV , renal CA with nephrectomy , DMII not on insulin , afib on eliquis , cardiomyopathy with 40% EF and CAD with stents       Non smoker  Non drinker    Lives at home with daughter      She comes in because of just not getting better she says.  She has felt bad now for several weeks if not longer.    She is so SOB she can't do much at all except lay around all day she says.     She is not noticing any more SOB laying down in bed.     She admits to more swelling  She thinks she is 20+ pounds over weight now from fluid   She has had CHF before and she takes lasix and aldactone at home alternating days       So she decided to come to our ER     In the ER her Creatinine was 3-4 range is normal now for her.    She had normal Cr back in 2022 but has gone up since    She has a nephrologist she follows with       She says she has not been urinating as much lately but she is taking her diuretics like she is supposed to be . Not missing doses.    Says her BP is controlled normally at home       In the ER her CXR shows CHF findings congestion     They gave her 40 mg IV lasix     She did make urine and said she felt better when I saw her       We are admitting her for CHF

## 2024-03-15 NOTE — ASSESSMENT & PLAN NOTE
BP has been very high this whole time here    Over 200      She got nitro paste and has clonidine patch on from home     I'm resuming her coreg and norvasc and giving doses tonight once     I'm also giving aldactone once now 25 mg and resuming 12.5 mg po daily     I'm giving one time metolazone 5 mg now to help with more diuresis   ( one time dose onlly )     And cont. IV lasix 40 mg Q8         If this is not working I would go to Nitro drip next to reduce BP / pre load

## 2024-03-15 NOTE — SUBJECTIVE & OBJECTIVE
"Past Medical History:   Diagnosis Date    Anticoagulant long-term use     Arthritis     Breast cancer 2014    invasive lobular carcinoma    Cancer of kidney 11/2020    RIGHT KIDNEY CANCER    CHF (congestive heart failure)     Coronary artery disease dx 2005    Depression     Diabetes mellitus     Diastolic heart failure secondary to hypertension     Gout     Hyperlipemia     Hypertension     Hypertrophy of nasal turbinates     Kidney mass 2020    Right    Levoscoliosis     Lung nodule     left    Multiple thyroid nodules     NICOLE (obstructive sleep apnea)     uses C-PAP    Pulmonary hypertension        Past Surgical History:   Procedure Laterality Date    AORTOGRAPHY N/A 12/04/2020    Procedure: Aortogram;  Surgeon: Paul Pedersen MD;  Location: [facility] CATH;  Service: Cardiology;  Laterality: N/A;    BREAST SURGERY      CARDIAC CATHETERIZATION  12/2020    CHOLECYSTECTOMY      COLONOSCOPY      multi -last 2014     CORONARY ARTERY BYPASS GRAFT      ESOPHAGOGASTRODUODENOSCOPY      2012     FRACTURE SURGERY      HAND SURGERY Right     HYSTERECTOMY      LAPAROSCOPIC ROBOT-ASSISTED SURGICAL REMOVAL OF KIDNEY USING DA CHELLE XI Right 03/10/2022    Procedure: XI ROBOTIC NEPHRECTOMY- radical;  Surgeon: Rolando Ramirez MD;  Location: [facility] OR;  Service: Urology;  Laterality: Right;    MASTECTOMY W/ SENTINEL NODE BIOPSY Bilateral 01/21/2015    bilateral "dog ears"    NASAL SINUS SURGERY  2015    Dr Bryant FESS/cauterization turbinate     PARTIAL HYSTERECTOMY  1989    PERCUTANEOUS TRANSLUMINAL BALLOON ANGIOPLASTY OF CORONARY ARTERY  12/04/2020    Procedure: Angioplasty-coronary;  Surgeon: Paul Pedersen MD;  Location: [facility] CATH;  Service: Cardiology;;    RENAL BIOPSY Right     9/20/2021 EJ    TUBAL LIGATION      ULTRASOUND GUIDANCE  12/04/2020    Procedure: Ultrasound Guidance;  Surgeon: Paul Pedersen MD;  Location: [facility] CATH;  Service: Cardiology;;       Review of patient's allergies indicates:   Allergen Reactions    " Percocet [oxycodone-acetaminophen] Itching    Amiodarone analogues      Itching      Irbesartan Swelling    Januvia [sitagliptin]     Jardiance [empagliflozin]      Leg cramps    Lipitor [atorvastatin] Other (See Comments)     Severe leg pain    Linaclotide Other (See Comments) and Nausea And Vomiting     Does not remember    Lubiprostone Other (See Comments) and Palpitations     Does not remember       No current facility-administered medications on file prior to encounter.     Current Outpatient Medications on File Prior to Encounter   Medication Sig    allopurinoL (ZYLOPRIM) 300 MG tablet Take 1 tablet (300 mg total) by mouth once daily.    amLODIPine (NORVASC) 10 MG tablet Take 10 mg by mouth once daily.    calcitRIOL (ROCALTROL) 0.5 MCG Cap Take 0.5 mcg by mouth once daily.    carvediloL (COREG) 25 MG tablet TAKE ONE TABLET BY MOUTH TWICE DAILY (Patient taking differently: Take 25 mg by mouth 2 (two) times daily.)    cloNIDine (CATAPRES) 0.1 MG tablet Take 1 tablet (0.1 mg total) by mouth 3 (three) times daily as needed (PRN SBP > 165 mmHg).    cyanocobalamin (VITAMIN B-12) 100 MCG tablet Take 100 mcg by mouth once daily.    evolocumab (REPATHA SURECLICK) 140 mg/mL PnIj Inject 1 mL (140 mg total) into the skin every 14 (fourteen) days.    furosemide (LASIX) 40 MG tablet Take 40 mg by mouth once daily.    loratadine (CLARITIN) 10 mg tablet Take 10 mg by mouth once daily.    magnesium oxide (MAG-OX) 400 mg (241.3 mg magnesium) tablet Take 1 tablet (400 mg total) by mouth daily as needed (cramping). (Patient taking differently: Take 400 mg by mouth once daily.)    metOLazone (ZAROXOLYN) 2.5 MG tablet Take 2.5 mg by mouth nightly.    nitroGLYCERIN (NITROSTAT) 0.4 MG SL tablet Place 1 tablet (0.4 mg total) under the tongue every 5 (five) minutes as needed for Chest pain.    spironolactone (ALDACTONE) 25 MG tablet Take 0.5 tablets (12.5 mg total) by mouth once daily.    apixaban (ELIQUIS) 5 mg Tab Take 1 tablet (5 mg  total) by mouth 2 (two) times daily. (Patient not taking: Reported on 3/14/2024)    blood sugar diagnostic (TRUE METRIX GLUCOSE TEST STRIP) Strp Use 1x daily. Insurance preferred.    blood sugar diagnostic Strp To check BG 1 time daily, to use with insurance preferred meter    cloNIDine 0.1 mg/24 hr td ptwk (CATAPRES) 0.1 mg/24 hr Place 1 patch onto the skin every 7 days. (Patient taking differently: Place 1 patch onto the skin every 7 days. )    glipiZIDE 5 MG TR24 Take 2 tablets (10 mg total) by mouth daily with breakfast. (Patient not taking: Reported on 3/14/2024)    lancets Misc To check BG 1 times daily, to use with insurance preferred meter    lancets Misc To check BG 1 times daily, to use with insurance preferred meter    latanoprost 0.005 % ophthalmic solution Place 1 drop into both eyes every evening. (Patient not taking: Reported on 3/14/2024)    [DISCONTINUED] aspirin (ECOTRIN) 81 MG EC tablet Take 1 tablet (81 mg total) by mouth once daily.    [DISCONTINUED] DULoxetine (CYMBALTA) 20 MG capsule TAKE ONE CAPSULE BY MOUTH ONCE DAILY    [DISCONTINUED] folic acid (FOLVITE) 1 MG tablet Take 1 mg by mouth once daily.    [DISCONTINUED] metFORMIN (GLUCOPHAGE-XR) 500 MG ER 24hr tablet Take 2 tablets (1,000 mg total) by mouth 2 (two) times daily with meals.    [DISCONTINUED] sodium bicarbonate 650 MG tablet Take 1 tablet (650 mg total) by mouth once daily.     Family History       Problem Relation (Age of Onset)    Asthma Daughter    Birth defects Daughter    Breast cancer Mother, Maternal Aunt    Depression Daughter    Drug abuse Daughter, Daughter    Glaucoma Sister    Hepatitis Brother    Hypertension Father    Learning disabilities Daughter    Mental illness Daughter    Stroke Father          Tobacco Use    Smoking status: Former     Current packs/day: 0.00     Types: Cigarettes     Start date: 2016     Quit date: 2016     Years since quittin.2    Smokeless tobacco: Never    Tobacco  comments:     quit    Substance and Sexual Activity    Alcohol use: No    Drug use: No    Sexual activity: Not Currently     Partners: Male     Birth control/protection: None     Review of Systems   Constitutional:  Positive for activity change, fatigue and unexpected weight change. Negative for appetite change, chills and fever.   HENT:  Negative for congestion, hearing loss and tinnitus.    Eyes:  Negative for pain, redness and visual disturbance.   Respiratory:  Positive for shortness of breath. Negative for apnea, cough, chest tightness and wheezing.    Cardiovascular:  Positive for leg swelling. Negative for chest pain and palpitations.   Gastrointestinal:  Negative for abdominal distention, abdominal pain, blood in stool, constipation, diarrhea, nausea and vomiting.   Genitourinary:  Negative for dysuria.   Musculoskeletal:  Negative for arthralgias, back pain, gait problem, joint swelling and myalgias.   Skin:  Negative for color change and rash.   Neurological:  Negative for dizziness, weakness, light-headedness and headaches.   Hematological:  Does not bruise/bleed easily.   Psychiatric/Behavioral:  Negative for confusion and sleep disturbance. The patient is not nervous/anxious.      Objective:     Vital Signs (Most Recent):  Temp: 98.7 °F (37.1 °C) (03/14/24 1640)  Pulse: 61 (03/15/24 0300)  Resp: (!) 24 (03/15/24 0300)  BP: (!) 206/108 (03/15/24 0300)  SpO2: (!) 92 % (03/15/24 0300) Vital Signs (24h Range):  Temp:  [98.7 °F (37.1 °C)] 98.7 °F (37.1 °C)  Pulse:  [57-71] 61  Resp:  [23-29] 24  SpO2:  [92 %-94 %] 92 %  BP: (170-232)/() 206/108     Weight: 132.9 kg (293 lb)  Body mass index is 45.89 kg/m².     Physical Exam  Vitals and nursing note reviewed.   Constitutional:       Appearance: Normal appearance.   HENT:      Head: Normocephalic and atraumatic.      Nose: Nose normal.      Mouth/Throat:      Mouth: Mucous membranes are moist.      Pharynx: Oropharynx is clear.   Eyes:       Extraocular Movements: Extraocular movements intact.      Conjunctiva/sclera: Conjunctivae normal.      Pupils: Pupils are equal, round, and reactive to light.   Cardiovascular:      Rate and Rhythm: Normal rate and regular rhythm.      Pulses: Normal pulses.      Heart sounds: Normal heart sounds.   Pulmonary:      Effort: Pulmonary effort is normal.      Breath sounds: Normal breath sounds.   Abdominal:      General: Abdomen is flat. Bowel sounds are normal.      Palpations: Abdomen is soft.   Musculoskeletal:         General: Normal range of motion.      Cervical back: Normal range of motion and neck supple.      Right lower leg: Edema present.      Left lower leg: Edema present.   Skin:     General: Skin is warm and dry.      Capillary Refill: Capillary refill takes less than 2 seconds.   Neurological:      General: No focal deficit present.      Mental Status: She is alert and oriented to person, place, and time. Mental status is at baseline.   Psychiatric:         Mood and Affect: Mood normal.         Behavior: Behavior normal.         Judgment: Judgment normal.              CRANIAL NERVES     CN III, IV, VI   Pupils are equal, round, and reactive to light.       Significant Labs: All pertinent labs within the past 24 hours have been reviewed.  CBC:   Recent Labs   Lab 03/14/24  1804   WBC 8.63   HGB 10.8*   HCT 38.2        CMP:   Recent Labs   Lab 03/14/24  1804      K 4.7   *   CO2 25   *   BUN 76*   CREATININE 3.8*   CALCIUM 9.8   PROT 6.6   ALBUMIN 3.6   BILITOT 0.5   ALKPHOS 104   AST 20   ALT 40   ANIONGAP 7*     Cardiac Markers:   Recent Labs   Lab 03/14/24  1804   BNP 1,867*       Significant Imaging: I have reviewed all pertinent imaging results/findings within the past 24 hours.  I have reviewed and interpreted all pertinent imaging results/findings within the past 24 hours.

## 2024-03-15 NOTE — ED PROVIDER NOTES
Source of History:  Patient, chart    Chief complaint:  Shortness of Breath and Leg Swelling      HPI:  Juhi Taylor is a 73 y.o. female with medical history of hypertension, CAD, diabetes, CHF, Afib, GERD, CKD presenting with increased fatigue, shortness of breath and bilateral leg swelling.  Patient states symptoms have been ongoing for the past few months but worse over the past few days.  Patient states she was diagnosed with COVID in December and has been in bed since that time.  Patient states she is compliant with her medications but has been intermittently stopping them due to concern that they are causing her swelling.  Patient states she is compliant with her blood pressure medications and her diuretics.  Patient denies any associated chest pain, dizziness.    This is the extent to the patients complaints today here in the emergency department.    ROS: As per HPI and below:    Constitutional: No fever.  No chills. Positive for fatigue  Eyes: No visual changes.  ENT: No sore throat. No ear pain    Cardiovascular: No chest pain.  Positive for peripheral edema  Respiratory:  Positive for shortness of breath.  GI: Positive for abdominal distension.  No abdominal pain.  No nausea or vomiting.  Genitourinary: No abnormal urination.  Neurologic: No headache. No focal weakness.  No numbness.  MSK: no back pain.  Integument: No rashes or lesions.  Hematologic: No easy bruising.  Endocrine: No excessive thirst or urination.    Review of patient's allergies indicates:   Allergen Reactions    Percocet [oxycodone-acetaminophen] Itching    Amiodarone analogues      Itching      Irbesartan Swelling    Januvia [sitagliptin]     Jardiance [empagliflozin]      Leg cramps    Lipitor [atorvastatin] Other (See Comments)     Severe leg pain    Linaclotide Other (See Comments) and Nausea And Vomiting     Does not remember    Lubiprostone Other (See Comments) and Palpitations     Does not remember       PMH:  As per HPI  "and below:  Past Medical History:   Diagnosis Date    Anticoagulant long-term use     Arthritis     Breast cancer 2014    invasive lobular carcinoma    Cancer of kidney 11/2020    RIGHT KIDNEY CANCER    CHF (congestive heart failure)     Coronary artery disease dx 2005    Depression     Diabetes mellitus     Diastolic heart failure secondary to hypertension     Gout     Hyperlipemia     Hypertension     Hypertrophy of nasal turbinates     Kidney mass 2020    Right    Levoscoliosis     Lung nodule     left    Multiple thyroid nodules     NICOLE (obstructive sleep apnea)     uses C-PAP    Pulmonary hypertension      Past Surgical History:   Procedure Laterality Date    AORTOGRAPHY N/A 12/04/2020    Procedure: Aortogram;  Surgeon: Paul Pedersen MD;  Location: [facility] CATH;  Service: Cardiology;  Laterality: N/A;    BREAST SURGERY      CARDIAC CATHETERIZATION  12/2020    CHOLECYSTECTOMY      COLONOSCOPY      multi -last 2014     CORONARY ARTERY BYPASS GRAFT      ESOPHAGOGASTRODUODENOSCOPY      2012     FRACTURE SURGERY      HAND SURGERY Right     HYSTERECTOMY      LAPAROSCOPIC ROBOT-ASSISTED SURGICAL REMOVAL OF KIDNEY USING DA CHELLE XI Right 03/10/2022    Procedure: XI ROBOTIC NEPHRECTOMY- radical;  Surgeon: Rolando Ramirez MD;  Location: [facility] OR;  Service: Urology;  Laterality: Right;    MASTECTOMY W/ SENTINEL NODE BIOPSY Bilateral 01/21/2015    bilateral "dog ears"    NASAL SINUS SURGERY  2015    Dr Bryant FESS/cauterization turbinate     PARTIAL HYSTERECTOMY  1989    PERCUTANEOUS TRANSLUMINAL BALLOON ANGIOPLASTY OF CORONARY ARTERY  12/04/2020    Procedure: Angioplasty-coronary;  Surgeon: Paul Pedersen MD;  Location: [facility] CATH;  Service: Cardiology;;    RENAL BIOPSY Right     9/20/2021 EJ    TUBAL LIGATION      ULTRASOUND GUIDANCE  12/04/2020    Procedure: Ultrasound Guidance;  Surgeon: Paul Pedersen MD;  Location: [facility] CATH;  Service: Cardiology;;       Social History     Tobacco Use    Smoking status: Former     " "Current packs/day: 0.00     Types: Cigarettes     Start date: 2016     Quit date: 2016     Years since quittin.2    Smokeless tobacco: Never    Tobacco comments:     quit    Substance Use Topics    Alcohol use: No    Drug use: No       Physical Exam:    BP (!) 230/105   Pulse 64   Temp 98.7 °F (37.1 °C)   Resp (!) 26   Ht 5' 7" (1.702 m)   Wt 132.9 kg (293 lb)   SpO2 (!) 93%   BMI 45.89 kg/m²     Nursing note and vital signs reviewed.  Constitutional: No acute distress.  Nontoxic.  Chronically ill-appearing.  Eyes: No conjunctival injection.Extraocular muscles are intact.  ENT: Oropharynx clear.  Normal phonation.   Cardiovascular: +2 pedal edema noted to bilateral lower extremities.  Legs are firm and nonpitting.  Regular rate and rhythm.  No murmurs. No gallops. No rubs  Respiratory:  Breath sounds diminished in bilateral upper lobes.  Rhonchi noted in right lower lobe.  No wheezing noted.  No accessory muscle usage.  Abdomen:  Distended but soft.  Nontender.  No guarding.  No rebound. Non-peritoneal.  Musculoskeletal: Good range of motion all joints.  No deformities.  Neck supple.  No meningismus.  Skin: No rashes seen.  Poor skin turgor.  No abrasions.  No ecchymoses.  Neurologic: Motor intact.  Sensation intact.  No ataxia. No focal neurological deficits.  Psych: Appropriate, conversant    MDM    Emergent evaluation of a 72 yo female presenting for bilateral lower extremity edema, increased shortness of breath and elevated blood pressure.  Patient states symptoms have been ongoing for the past few months but worse over the past few days.  Patient states she has been intermittent compliant with her medications but states she has been compliant with her diuretics and blood pressure patch.  Patient states she was diagnosed with COVID in December and has been laying in bed" since that time due to continued weakness.  Patient denies any recent fever or chills.  On exam pt is A&Ox3.  " Hypertensive, tachypneic and mildly hypoxic on initial exam. Nonfebrile and nontoxic appearing.  Chronically ill-appearing.  Mucous membranes pink and moist. Breath sounds diminished in bilateral upper lobes.  Rhonchi noted to right lower lobe.  Abdomen protuberant but soft and nontender.  No rebound or guarding appreciated on exam.  Bowel sounds within normal limits.  Pt speaking in full sentences.  Poor skin turgor noted on exam.   +2 pedal edema noted to bilateral lower extremities.  Legs are firm and nonpitting.    History Acquisition   Additional history was acquired from other historians.  Family, chart    The patient's list of active medical problems, social history, medications, and allergies as documented per RN staff has been reviewed.     Differential Diagnoses   Based on available information and the initial assessment, the working differential diagnoses considered during this evaluation include but are not limited to CHF exacerbation, COPD exacerbation, electrolyte abnormality, CKD, PEACE, dehydration, UTI, others.    I will get labs, imaging, EKG, medicate and reassess.  I discussed this case with my supervising physician.      LABS     Labs Reviewed   CBC W/ AUTO DIFFERENTIAL - Abnormal; Notable for the following components:       Result Value    Hemoglobin 10.8 (*)     MCH 24.4 (*)     MCHC 28.3 (*)     RDW 17.6 (*)     Gran % 78.7 (*)     Lymph % 13.3 (*)     All other components within normal limits   COMPREHENSIVE METABOLIC PANEL - Abnormal; Notable for the following components:    Chloride 111 (*)     Glucose 205 (*)     BUN 76 (*)     Creatinine 3.8 (*)     eGFR 12.1 (*)     Anion Gap 7 (*)     All other components within normal limits   TROPONIN I HIGH SENSITIVITY - Abnormal; Notable for the following components:    Troponin I High Sensitivity 38.4 (*)     All other components within normal limits   B-TYPE NATRIURETIC PEPTIDE - Abnormal; Notable for the following components:    BNP 1,867 (*)      All other components within normal limits   TROPONIN I HIGH SENSITIVITY - Abnormal; Notable for the following components:    Troponin I High Sensitivity 34.8 (*)     All other components within normal limits   MAGNESIUM   INFLUENZA A AND B ANTIGEN    Narrative:     Specimen Source->Nasopharyngeal Swab   SARS-COV-2 RNA AMPLIFICATION, QUAL   URINALYSIS, REFLEX TO URINE CULTURE   URINALYSIS MICROSCOPIC         Imaging     Imaging Results              X-Ray Chest PA And Lateral (Final result)  Result time 03/14/24 17:46:36   Procedure changed from X-Ray Chest AP Portable     Final result by Brian Killian MD (03/14/24 17:46:36)                   Narrative:    History: Congestive heart failure. Shortness of breath. Leg swelling.    FINDINGS: 2 view PA and lateral views of the chest were obtained. Comparison to previous study dated August 21, 2023.Minimal blunting the CP angles is noted bilaterally unchanged. No pleural effusion is identified on the lateral view. Cardiac silhouette is enlarged with mild central pulmonary vascular congestion. No focal consolidation is seen. Bony thorax appears intact.    IMPRESSION:  1. Cardiomegaly with mild central pulmonary vascular congestion.    Electronically signed by:  Brian Killian MD  03/14/2024 05:46 PM CDT Workstation: [id number]                                    A radiology report was available for my review at the time of the encounter.    EKG   EKG Readings: (Independently Interpreted)   Initial Reading: No STEMI. Previous EKG Date: 9/15/23. Rhythm: Sinus Arrhythmia. Heart Rate: 70. Conduction: LBBB. Axis: Left Axis Deviation.   My independent interpretation.    No STEMI  Sinus rhythm with occasional PVCs  Left bundle branch block  Rate of 70       Additional Consideration   All available testing was considered during the course of this workup.  Comorbidities taken into consideration during the patient's evaluation and treatment include weight, age.    Social  "determinants of health were taken into consideration during development of our treatment plan.    Medications   hydrALAZINE injection 10 mg (10 mg Intravenous Not Given 3/14/24 2245)   furosemide injection 40 mg (40 mg Intravenous Given 3/14/24 2216)   cloNIDine tablet 0.1 mg (0.1 mg Oral Given 3/14/24 2216)   nitroGLYCERIN 2% TD oint ointment 1 inch (1 inch Topical (Top) Given 3/14/24 2303)      ED Course as of 03/14/24 2351   u Mar 14, 2024   2230 CBC unremarkable.  No leukocytosis noted.  H&H stable at 10.8 and 38.2.  CMP shows a BUN of 76 with a creatinine of 3.8.  This is a slight increase from patient's baseline.  BNP elevated 1867 with a troponin of 38.4.  Patient denies any active chest pain.  Patient is hypertensive on exam.  P.o. clonidine ordered.  Mag within normal limits at 2.1.  COVID and influenza negative. [RZ]   2300 Patient's blood pressure remains elevated despite p.o. clonidine.  Ordered hydralazine IV.  Patient refusing stating "it is bad for my kidneys".  Will place nitro paste to help with blood pressure control.   [RZ]   2351 Critical Care:   Critical Care Times:   Direct Patient Care (initial evaluation, reassessments, and time considering the case)................................................................10 minutes.   Additional History from reviewing old medical records or taking additional history from the family, EMS, PCP, etc.......................10 minutes.   Ordering, Reviewing, and Interpreting Diagnostic Studies...............................................................................................................10 minutes.   Documentation..................................................................................................................................................................................5 minutes.   ==============================================================  · Total Critical Care Time - exclusive of procedural time: 35 " minutes.  ==============================================================  Critical care was necessary to treat or prevent imminent or life-threatening deterioration of the following conditions: HTN urgency, CHF exacerbation.   Critical care was time spent personally by me on the following activities: obtaining history from patient or relative, examination of patient, review of x-rays / CT sent with the patient, ordering lab, x-rays, and/or EKG, development of treatment plan with patient or relative, ordering and performing treatments and interventions, interpretation of cardiac measurements and re-evaluation of patient's conition.   Critical Care Condition: potentially life-threatening  [RZ]      ED Course User Index  [RZ] Rosa Isela Leone NP             CLINICAL IMPRESSION  1. Acute on chronic combined systolic and diastolic congestive heart failure    2. Shortness of breath    3. SOB (shortness of breath)    4. Leg swelling    5. Hypertension, unspecified type         ED Disposition Condition    Admit Stable          This note was created using dictation software.  This program may occasionally mistype words and phrases.         Rosa Isela Leone NP  03/14/24 2651

## 2024-03-15 NOTE — ASSESSMENT & PLAN NOTE
She has combined systolic and diastolic acute on chronic heart failure     CXR shows congestion   She is swelling more and gaining weight     Her poor renal function and reduced heart function both contribute to this     Her BP has been very high for us , which I'm sure is reducing her cardiac output with such high afterload     She got some nitro paste in ER and she had her clonidine patch on   She got 40 mg IV lasix also and did feel better after     I think diuresing her is what is needed.    Her renal function should not worsen if truly overloaded in CHF . May actually get better.       PLAN    - reduce afterload by reducing her BP  - cont. IV Lasix 40 mg IV Q8   - giving one time Aldactone 25 mg dose to help BP hopefully and improve diuresis   - I'm giving LOKELMA 10 gm once also just because of the Aldactone dose and renal function to be safe.  Lasix will also help   - give her coreg now, her norvasc now , aldactone dose now , and I'm adding a dose of Metolazone 5 mg once now ( she takes 2.5 at home)  - cont. Her clonidine patch which is on already     She refused hydralazine IV earlier ER said  worried about her kidneys       I may look  at her with Ultra sound to see if still overloaded or if she looks full intravascularly       Repeat labs in am,  BMP and Mag

## 2024-03-15 NOTE — H&P
[facility] - Emergency Dept  Hospital Medicine  History & Physical    Patient Name: Juhi Taylor  MRN: [id number]  Patient Class: OP- Observation  Admission Date: 3/14/2024  Attending Physician: Galileo German MD  Primary Care Provider: Tony Sadler MD         Patient information was obtained from patient and ER records.     Subjective:     Principal Problem:Congestive heart failure    Chief Complaint:   Chief Complaint   Patient presents with    Shortness of Breath    Leg Swelling        HPI: 73 BF with pmh of HTN ,  CKD IV , renal CA with nephrectomy , DMII not on insulin , afib on eliquis , cardiomyopathy with 40% EF and CAD with stents       Non smoker  Non drinker    Lives at home with daughter      She comes in because of just not getting better she says.  She has felt bad now for several weeks if not longer.    She is so SOB she can't do much at all except lay around all day she says.     She is not noticing any more SOB laying down in bed.     She admits to more swelling  She thinks she is 20+ pounds over weight now from fluid   She has had CHF before and she takes lasix and aldactone at home alternating days       So she decided to come to our ER     In the ER her Creatinine was 3-4 range is normal now for her.    She had normal Cr back in 2022 but has gone up since    She has a nephrologist she follows with       She says she has not been urinating as much lately but she is taking her diuretics like she is supposed to be . Not missing doses.    Says her BP is controlled normally at home       In the ER her CXR shows CHF findings congestion     They gave her 40 mg IV lasix     She did make urine and said she felt better when I saw her       We are admitting her for CHF     Past Medical History:   Diagnosis Date    Anticoagulant long-term use     Arthritis     Breast cancer 2014    invasive lobular carcinoma    Cancer of kidney 11/2020    RIGHT KIDNEY CANCER    CHF  "(congestive heart failure)     Coronary artery disease dx 2005    Depression     Diabetes mellitus     Diastolic heart failure secondary to hypertension     Gout     Hyperlipemia     Hypertension     Hypertrophy of nasal turbinates     Kidney mass 2020    Right    Levoscoliosis     Lung nodule     left    Multiple thyroid nodules     NICOLE (obstructive sleep apnea)     uses C-PAP    Pulmonary hypertension        Past Surgical History:   Procedure Laterality Date    AORTOGRAPHY N/A 12/04/2020    Procedure: Aortogram;  Surgeon: Paul Pedersen MD;  [facility];  Service: Cardiology;  Laterality: N/A;    BREAST SURGERY      CARDIAC CATHETERIZATION  12/2020    CHOLECYSTECTOMY      COLONOSCOPY      multi -last 2014     CORONARY ARTERY BYPASS GRAFT      ESOPHAGOGASTRODUODENOSCOPY      2012     FRACTURE SURGERY      HAND SURGERY Right     HYSTERECTOMY      LAPAROSCOPIC ROBOT-ASSISTED SURGICAL REMOVAL OF KIDNEY USING DA CHELLE XI Right 03/10/2022    Procedure: XI ROBOTIC NEPHRECTOMY- radical;  Surgeon: Rolando Ramirez MD;  Location: [facility] OR;  Service: Urology;  Laterality: Right;    MASTECTOMY W/ SENTINEL NODE BIOPSY Bilateral 01/21/2015    bilateral "dog ears"    NASAL SINUS SURGERY  2015    Dr Bryant FESS/cauterization turbinate     PARTIAL HYSTERECTOMY  1989    PERCUTANEOUS TRANSLUMINAL BALLOON ANGIOPLASTY OF CORONARY ARTERY  12/04/2020    Procedure: Angioplasty-coronary;  Surgeon: Paul Pedersen MD;  Location: [facility] CATH;  Service: Cardiology;;    RENAL BIOPSY Right     9/20/2021 EJ    TUBAL LIGATION      ULTRASOUND GUIDANCE  12/04/2020    Procedure: Ultrasound Guidance;  Surgeon: Paul Pedersen MD;  Location: [facility] CATH;  Service: Cardiology;;       Review of patient's allergies indicates:   Allergen Reactions    Percocet [oxycodone-acetaminophen] Itching    Amiodarone analogues      Itching      Irbesartan Swelling    Januvia [sitagliptin]     Jardiance [empagliflozin]      Leg cramps    Lipitor [atorvastatin] " Other (See Comments)     Severe leg pain    Linaclotide Other (See Comments) and Nausea And Vomiting     Does not remember    Lubiprostone Other (See Comments) and Palpitations     Does not remember       No current facility-administered medications on file prior to encounter.     Current Outpatient Medications on File Prior to Encounter   Medication Sig    allopurinoL (ZYLOPRIM) 300 MG tablet Take 1 tablet (300 mg total) by mouth once daily.    amLODIPine (NORVASC) 10 MG tablet Take 10 mg by mouth once daily.    calcitRIOL (ROCALTROL) 0.5 MCG Cap Take 0.5 mcg by mouth once daily.    carvediloL (COREG) 25 MG tablet TAKE ONE TABLET BY MOUTH TWICE DAILY (Patient taking differently: Take 25 mg by mouth 2 (two) times daily.)    cloNIDine (CATAPRES) 0.1 MG tablet Take 1 tablet (0.1 mg total) by mouth 3 (three) times daily as needed (PRN SBP > 165 mmHg).    cyanocobalamin (VITAMIN B-12) 100 MCG tablet Take 100 mcg by mouth once daily.    evolocumab (REPATHA SURECLICK) 140 mg/mL PnIj Inject 1 mL (140 mg total) into the skin every 14 (fourteen) days.    furosemide (LASIX) 40 MG tablet Take 40 mg by mouth once daily.    loratadine (CLARITIN) 10 mg tablet Take 10 mg by mouth once daily.    magnesium oxide (MAG-OX) 400 mg (241.3 mg magnesium) tablet Take 1 tablet (400 mg total) by mouth daily as needed (cramping). (Patient taking differently: Take 400 mg by mouth once daily.)    metOLazone (ZAROXOLYN) 2.5 MG tablet Take 2.5 mg by mouth nightly.    nitroGLYCERIN (NITROSTAT) 0.4 MG SL tablet Place 1 tablet (0.4 mg total) under the tongue every 5 (five) minutes as needed for Chest pain.    spironolactone (ALDACTONE) 25 MG tablet Take 0.5 tablets (12.5 mg total) by mouth once daily.    apixaban (ELIQUIS) 5 mg Tab Take 1 tablet (5 mg total) by mouth 2 (two) times daily. (Patient not taking: Reported on 3/14/2024)    blood sugar diagnostic (TRUE METRIX GLUCOSE TEST STRIP) Strp Use 1x daily. Insurance preferred.    blood sugar  diagnostic Strp To check BG 1 time daily, to use with insurance preferred meter    cloNIDine 0.1 mg/24 hr td ptwk (CATAPRES) 0.1 mg/24 hr Place 1 patch onto the skin every 7 days. (Patient taking differently: Place 1 patch onto the skin every 7 days. )    glipiZIDE 5 MG TR24 Take 2 tablets (10 mg total) by mouth daily with breakfast. (Patient not taking: Reported on 3/14/2024)    lancets Misc To check BG 1 times daily, to use with insurance preferred meter    lancets Misc To check BG 1 times daily, to use with insurance preferred meter    latanoprost 0.005 % ophthalmic solution Place 1 drop into both eyes every evening. (Patient not taking: Reported on 3/14/2024)    [DISCONTINUED] aspirin (ECOTRIN) 81 MG EC tablet Take 1 tablet (81 mg total) by mouth once daily.    [DISCONTINUED] DULoxetine (CYMBALTA) 20 MG capsule TAKE ONE CAPSULE BY MOUTH ONCE DAILY    [DISCONTINUED] folic acid (FOLVITE) 1 MG tablet Take 1 mg by mouth once daily.    [DISCONTINUED] metFORMIN (GLUCOPHAGE-XR) 500 MG ER 24hr tablet Take 2 tablets (1,000 mg total) by mouth 2 (two) times daily with meals.    [DISCONTINUED] sodium bicarbonate 650 MG tablet Take 1 tablet (650 mg total) by mouth once daily.     Family History       Problem Relation (Age of Onset)    Asthma Daughter    Birth defects Daughter    Breast cancer Mother, Maternal Aunt    Depression Daughter    Drug abuse Daughter, Daughter    Glaucoma Sister    Hepatitis Brother    Hypertension Father    Learning disabilities Daughter    Mental illness Daughter    Stroke Father          Tobacco Use    Smoking status: Former     Current packs/day: 0.00     Types: Cigarettes     Start date: 2016     Quit date: 2016     Years since quittin.2    Smokeless tobacco: Never    Tobacco comments:     quit    Substance and Sexual Activity    Alcohol use: No    Drug use: No    Sexual activity: Not Currently     Partners: Male     Birth control/protection: None     Review of  Systems   Constitutional:  Positive for activity change, fatigue and unexpected weight change. Negative for appetite change, chills and fever.   HENT:  Negative for congestion, hearing loss and tinnitus.    Eyes:  Negative for pain, redness and visual disturbance.   Respiratory:  Positive for shortness of breath. Negative for apnea, cough, chest tightness and wheezing.    Cardiovascular:  Positive for leg swelling. Negative for chest pain and palpitations.   Gastrointestinal:  Negative for abdominal distention, abdominal pain, blood in stool, constipation, diarrhea, nausea and vomiting.   Genitourinary:  Negative for dysuria.   Musculoskeletal:  Negative for arthralgias, back pain, gait problem, joint swelling and myalgias.   Skin:  Negative for color change and rash.   Neurological:  Negative for dizziness, weakness, light-headedness and headaches.   Hematological:  Does not bruise/bleed easily.   Psychiatric/Behavioral:  Negative for confusion and sleep disturbance. The patient is not nervous/anxious.      Objective:     Vital Signs (Most Recent):  Temp: 98.7 °F (37.1 °C) (03/14/24 1640)  Pulse: 61 (03/15/24 0300)  Resp: (!) 24 (03/15/24 0300)  BP: (!) 206/108 (03/15/24 0300)  SpO2: (!) 92 % (03/15/24 0300) Vital Signs (24h Range):  Temp:  [98.7 °F (37.1 °C)] 98.7 °F (37.1 °C)  Pulse:  [57-71] 61  Resp:  [23-29] 24  SpO2:  [92 %-94 %] 92 %  BP: (170-232)/() 206/108     Weight: 132.9 kg (293 lb)  Body mass index is 45.89 kg/m².     Physical Exam  Vitals and nursing note reviewed.   Constitutional:       Appearance: Normal appearance.   HENT:      Head: Normocephalic and atraumatic.      Nose: Nose normal.      Mouth/Throat:      Mouth: Mucous membranes are moist.      Pharynx: Oropharynx is clear.   Eyes:      Extraocular Movements: Extraocular movements intact.      Conjunctiva/sclera: Conjunctivae normal.      Pupils: Pupils are equal, round, and reactive to light.   Cardiovascular:      Rate and Rhythm:  Normal rate and regular rhythm.      Pulses: Normal pulses.      Heart sounds: Normal heart sounds.   Pulmonary:      Effort: Pulmonary effort is normal.      Breath sounds: Normal breath sounds.   Abdominal:      General: Abdomen is flat. Bowel sounds are normal.      Palpations: Abdomen is soft.   Musculoskeletal:         General: Normal range of motion.      Cervical back: Normal range of motion and neck supple.      Right lower leg: Edema present.      Left lower leg: Edema present.   Skin:     General: Skin is warm and dry.      Capillary Refill: Capillary refill takes less than 2 seconds.   Neurological:      General: No focal deficit present.      Mental Status: She is alert and oriented to person, place, and time. Mental status is at baseline.   Psychiatric:         Mood and Affect: Mood normal.         Behavior: Behavior normal.         Judgment: Judgment normal.              CRANIAL NERVES     CN III, IV, VI   Pupils are equal, round, and reactive to light.       Significant Labs: All pertinent labs within the past 24 hours have been reviewed.  CBC:   Recent Labs   Lab 03/14/24  1804   WBC 8.63   HGB 10.8*   HCT 38.2        CMP:   Recent Labs   Lab 03/14/24  1804      K 4.7   *   CO2 25   *   BUN 76*   CREATININE 3.8*   CALCIUM 9.8   PROT 6.6   ALBUMIN 3.6   BILITOT 0.5   ALKPHOS 104   AST 20   ALT 40   ANIONGAP 7*     Cardiac Markers:   Recent Labs   Lab 03/14/24  1804   BNP 1,867*       Significant Imaging: I have reviewed all pertinent imaging results/findings within the past 24 hours.  I have reviewed and interpreted all pertinent imaging results/findings within the past 24 hours.  Assessment/Plan:     * Congestive heart failure  She has combined systolic and diastolic acute on chronic heart failure     CXR shows congestion   She is swelling more and gaining weight     Her poor renal function and reduced heart function both contribute to this     Her BP has been very high for  us , which I'm sure is reducing her cardiac output with such high afterload     She got some nitro paste in ER and she had her clonidine patch on   She got 40 mg IV lasix also and did feel better after     I think diuresing her is what is needed.    Her renal function should not worsen if truly overloaded in CHF . May actually get better.       PLAN    - reduce afterload by reducing her BP  - cont. IV Lasix 40 mg IV Q8   - giving one time Aldactone 25 mg dose to help BP hopefully and improve diuresis   - I'm giving LOKELMA 10 gm once also just because of the Aldactone dose and renal function to be safe.  Lasix will also help   - give her coreg now, her norvasc now , aldactone dose now , and I'm adding a dose of Metolazone 5 mg once now ( she takes 2.5 at home)  - cont. Her clonidine patch which is on already     She refused hydralazine IV earlier ER said  worried about her kidneys       I may look  at her with Ultra sound to see if still overloaded or if she looks full intravascularly       Repeat labs in am,  BMP and Mag       CKD (chronic kidney disease), stage IV  Has one kidney   And has stage IV CKD now     Cr stable     AVOID unneeded nephrotoxic drugs or those that reduce renal glomerular perfusion like ACE or ARB and NSAIDS    I am giving diuretics , operating under the idea that she is in CHF volume overloaded  .       Repeat labs , BMP in am     I did give one time LOKELMA 10 gm tonight because I gave extra 25 mg Aldactone dose to help BP and CHF     Paroxysmal atrial fibrillation  Resume coreg bid    And resume Eliquis now     it was on hold for thyroid biopsy today     Thyroid nodule  Had biopsy today   Eliquis was on hold      Type 2 diabetes mellitus with microalbuminuria, without long-term current use of insulin  For now , no meds.  No insulin .     Hold her home glipizide     Check A1C       Essential hypertension  BP has been very high this whole time here    Over 200      She got nitro paste and  has clonidine patch on from home     I'm resuming her coreg and norvasc and giving doses tonight once     I'm also giving aldactone once now 25 mg and resuming 12.5 mg po daily     I'm giving one time metolazone 5 mg now to help with more diuresis   ( one time dose onlly )     And cont. IV lasix 40 mg Q8         If this is not working I would go to Nitro drip next to reduce BP / pre load       VTE Risk Mitigation (From admission, onward)           Ordered     apixaban tablet 5 mg  2 times daily         03/15/24 0244     Reason for no Mechanical VTE Prophylaxis  Once        Question:  Reasons:  Answer:  Risk of Bleeding    03/15/24 0221     IP VTE HIGH RISK PATIENT  Once         03/15/24 0221     Place sequential compression device  Until discontinued         03/15/24 0221                       On 03/15/2024, patient should be placed in hospital observation services under my care.             Galileo German MD  Department of Hospital Medicine  [facility] - Emergency Dept

## 2024-03-15 NOTE — ASSESSMENT & PLAN NOTE
Has one kidney   And has stage IV CKD now     Cr stable     AVOID unneeded nephrotoxic drugs or those that reduce renal glomerular perfusion like ACE or ARB and NSAIDS    I am giving diuretics , operating under the idea that she is in CHF volume overloaded  .       Repeat labs , BMP in am     I did give one time LOKELMA 10 gm tonight because I gave extra 25 mg Aldactone dose to help BP and CHF

## 2024-03-16 LAB
ANION GAP SERPL CALC-SCNC: 8 MMOL/L (ref 8–16)
BUN SERPL-MCNC: 87 MG/DL (ref 8–23)
CALCIUM SERPL-MCNC: 9.9 MG/DL (ref 8.7–10.5)
CHLORIDE SERPL-SCNC: 108 MMOL/L (ref 95–110)
CO2 SERPL-SCNC: 26 MMOL/L (ref 23–29)
CREAT SERPL-MCNC: 3.6 MG/DL (ref 0.5–1.4)
EST. GFR  (NO RACE VARIABLE): 12.9 ML/MIN/1.73 M^2
GLUCOSE SERPL-MCNC: 123 MG/DL (ref 70–110)
POTASSIUM SERPL-SCNC: 4.6 MMOL/L (ref 3.5–5.1)
SODIUM SERPL-SCNC: 142 MMOL/L (ref 136–145)

## 2024-03-16 PROCEDURE — 25000003 PHARM REV CODE 250: Performed by: INTERNAL MEDICINE

## 2024-03-16 PROCEDURE — 25000003 PHARM REV CODE 250: Performed by: HOSPITALIST

## 2024-03-16 PROCEDURE — 94761 N-INVAS EAR/PLS OXIMETRY MLT: CPT

## 2024-03-16 PROCEDURE — 80048 BASIC METABOLIC PNL TOTAL CA: CPT | Performed by: INTERNAL MEDICINE

## 2024-03-16 PROCEDURE — 63600175 PHARM REV CODE 636 W HCPCS: Performed by: HOSPITALIST

## 2024-03-16 PROCEDURE — 12000002 HC ACUTE/MED SURGE SEMI-PRIVATE ROOM

## 2024-03-16 PROCEDURE — 63600175 PHARM REV CODE 636 W HCPCS: Performed by: INTERNAL MEDICINE

## 2024-03-16 PROCEDURE — 36415 COLL VENOUS BLD VENIPUNCTURE: CPT | Performed by: INTERNAL MEDICINE

## 2024-03-16 RX ORDER — FUROSEMIDE 10 MG/ML
40 INJECTION INTRAMUSCULAR; INTRAVENOUS EVERY 12 HOURS
Status: DISCONTINUED | OUTPATIENT
Start: 2024-03-16 | End: 2024-03-17

## 2024-03-16 RX ORDER — METOLAZONE 2.5 MG/1
5 TABLET ORAL DAILY
Status: DISCONTINUED | OUTPATIENT
Start: 2024-03-16 | End: 2024-03-18 | Stop reason: HOSPADM

## 2024-03-16 RX ORDER — CETIRIZINE HYDROCHLORIDE 10 MG/1
10 TABLET ORAL DAILY
Status: DISCONTINUED | OUTPATIENT
Start: 2024-03-16 | End: 2024-03-18 | Stop reason: HOSPADM

## 2024-03-16 RX ADMIN — METOLAZONE 5 MG: 2.5 TABLET ORAL at 05:03

## 2024-03-16 RX ADMIN — CETIRIZINE HYDROCHLORIDE 10 MG: 10 TABLET, FILM COATED ORAL at 09:03

## 2024-03-16 RX ADMIN — AMLODIPINE BESYLATE 10 MG: 5 TABLET ORAL at 08:03

## 2024-03-16 RX ADMIN — CARVEDILOL 25 MG: 25 TABLET, FILM COATED ORAL at 08:03

## 2024-03-16 RX ADMIN — SPIRONOLACTONE 12.5 MG: 25 TABLET ORAL at 08:03

## 2024-03-16 RX ADMIN — FUROSEMIDE 40 MG: 10 INJECTION, SOLUTION INTRAVENOUS at 08:03

## 2024-03-16 RX ADMIN — FUROSEMIDE 40 MG: 10 INJECTION, SOLUTION INTRAMUSCULAR; INTRAVENOUS at 08:03

## 2024-03-16 RX ADMIN — CALCITRIOL CAPSULES 0.25 MCG 0.5 MCG: 0.25 CAPSULE ORAL at 08:03

## 2024-03-16 RX ADMIN — APIXABAN 5 MG: 5 TABLET, FILM COATED ORAL at 08:03

## 2024-03-16 RX ADMIN — Medication 400 MG: at 03:03

## 2024-03-16 RX ADMIN — FUROSEMIDE 40 MG: 10 INJECTION, SOLUTION INTRAVENOUS at 01:03

## 2024-03-16 NOTE — SUBJECTIVE & OBJECTIVE
Interval History:   Patient is sitting at the edge of bed, lungs is basically clear, renal function is near the same, patient request Nephrology reviewed since she has appointment with Dr. Martinez.  Weight gain reported      Review of Systems  Objective:     Vital Signs (Most Recent):  Temp: 98.1 °F (36.7 °C) (03/16/24 1137)  Pulse: 104 (03/16/24 1137)  Resp: 19 (03/16/24 1137)  BP: (!) 134/91 (03/16/24 1137)  SpO2: (!) 92 % (03/16/24 1137) Vital Signs (24h Range):  Temp:  [97.2 °F (36.2 °C)-98.1 °F (36.7 °C)] 98.1 °F (36.7 °C)  Pulse:  [] 104  Resp:  [15-22] 19  SpO2:  [90 %-99 %] 92 %  BP: (134-213)/() 134/91     Weight: (!) 137.9 kg (304 lb)  Body mass index is 47.61 kg/m².    Intake/Output Summary (Last 24 hours) at 3/16/2024 1335  Last data filed at 3/16/2024 0951  Gross per 24 hour   Intake 120 ml   Output 1100 ml   Net -980 ml         Physical Exam  Vitals and nursing note reviewed.   HENT:      Head: Normocephalic and atraumatic.   Eyes:      Conjunctiva/sclera: Conjunctivae normal.   Neck:      Vascular: No JVD.   Cardiovascular:      Heart sounds: Normal heart sounds.   Pulmonary:      Effort: Pulmonary effort is normal.      Breath sounds: Normal breath sounds.   Abdominal:      General: Bowel sounds are normal.      Palpations: Abdomen is soft.   Musculoskeletal:         General: Normal range of motion.   Skin:     General: Skin is warm.   Neurological:      Mental Status: She is alert and oriented to person, place, and time.   Psychiatric:         Mood and Affect: Mood normal.             Significant Labs: All pertinent labs within the past 24 hours have been reviewed.  CMP:   Recent Labs   Lab 03/14/24  1804 03/15/24  0400 03/16/24  0438    144 142   K 4.7 4.6 4.6   * 111* 108   CO2 25 27 26   * 145* 123*   BUN 76* 80* 87*   CREATININE 3.8* 3.5* 3.6*   CALCIUM 9.8 10.0 9.9   PROT 6.6  --   --    ALBUMIN 3.6  --   --    BILITOT 0.5  --   --    ALKPHOS 104  --   --    AST  "20  --   --    ALT 40  --   --    ANIONGAP 7* 6* 8     Cardiac Markers:   Recent Labs   Lab 03/14/24  1804   BNP 1,867*     Coagulation: No results for input(s): "PT", "INR", "APTT" in the last 48 hours.    Significant Imaging: I have reviewed all pertinent imaging results/findings within the past 24 hours.  "

## 2024-03-16 NOTE — ASSESSMENT & PLAN NOTE
Single  kidney  with stage IV CKD now   Hold lokema for now   Nephrology Cx as per patient request  On IV lasix

## 2024-03-16 NOTE — PROGRESS NOTES
[facility] Medicine  Progress Note    Patient Name: Juhi Taylor  MRN: [id number]  Patient Class: IP- Inpatient   Admission Date: 3/14/2024  Length of Stay: 1 days  Attending Physician: Manuel Christensen MD  Primary Care Provider: Tony Sadler MD        Subjective:     Principal Problem:Congestive heart failure        HPI:  73 BF with pmh of HTN ,  CKD IV , renal CA with nephrectomy , DMII not on insulin , afib on eliquis , cardiomyopathy with 40% EF and CAD with stents       Non smoker  Non drinker    Lives at home with daughter      She comes in because of just not getting better she says.  She has felt bad now for several weeks if not longer.    She is so SOB she can't do much at all except lay around all day she says.     She is not noticing any more SOB laying down in bed.     She admits to more swelling  She thinks she is 20+ pounds over weight now from fluid   She has had CHF before and she takes lasix and aldactone at home alternating days       So she decided to come to our ER     In the ER her Creatinine was 3-4 range is normal now for her.    She had normal Cr back in 2022 but has gone up since    She has a nephrologist she follows with       She says she has not been urinating as much lately but she is taking her diuretics like she is supposed to be . Not missing doses.    Says her BP is controlled normally at home       In the ER her CXR shows CHF findings congestion     They gave her 40 mg IV lasix     She did make urine and said she felt better when I saw her       We are admitting her for CHF     Overview/Hospital Course:  No notes on file    Interval History:   Patient is sitting at the edge of bed, lungs is basically clear, renal function is near the same, patient request Nephrology reviewed since she has appointment with Dr. Martinez.  Weight gain reported      Review of Systems  Objective:     Vital Signs (Most Recent):  Temp: 98.1 °F (36.7 °C) (03/16/24  "1137)  Pulse: 104 (03/16/24 1137)  Resp: 19 (03/16/24 1137)  BP: (!) 134/91 (03/16/24 1137)  SpO2: (!) 92 % (03/16/24 1137) Vital Signs (24h Range):  Temp:  [97.2 °F (36.2 °C)-98.1 °F (36.7 °C)] 98.1 °F (36.7 °C)  Pulse:  [] 104  Resp:  [15-22] 19  SpO2:  [90 %-99 %] 92 %  BP: (134-213)/() 134/91     Weight: (!) 137.9 kg (304 lb)  Body mass index is 47.61 kg/m².    Intake/Output Summary (Last 24 hours) at 3/16/2024 1335  Last data filed at 3/16/2024 0951  Gross per 24 hour   Intake 120 ml   Output 1100 ml   Net -980 ml         Physical Exam  Vitals and nursing note reviewed.   HENT:      Head: Normocephalic and atraumatic.   Eyes:      Conjunctiva/sclera: Conjunctivae normal.   Neck:      Vascular: No JVD.   Cardiovascular:      Heart sounds: Normal heart sounds.   Pulmonary:      Effort: Pulmonary effort is normal.      Breath sounds: Normal breath sounds.   Abdominal:      General: Bowel sounds are normal.      Palpations: Abdomen is soft.   Musculoskeletal:         General: Normal range of motion.   Skin:     General: Skin is warm.   Neurological:      Mental Status: She is alert and oriented to person, place, and time.   Psychiatric:         Mood and Affect: Mood normal.             Significant Labs: All pertinent labs within the past 24 hours have been reviewed.  CMP:   Recent Labs   Lab 03/14/24  1804 03/15/24  0400 03/16/24  0438    144 142   K 4.7 4.6 4.6   * 111* 108   CO2 25 27 26   * 145* 123*   BUN 76* 80* 87*   CREATININE 3.8* 3.5* 3.6*   CALCIUM 9.8 10.0 9.9   PROT 6.6  --   --    ALBUMIN 3.6  --   --    BILITOT 0.5  --   --    ALKPHOS 104  --   --    AST 20  --   --    ALT 40  --   --    ANIONGAP 7* 6* 8     Cardiac Markers:   Recent Labs   Lab 03/14/24  0704   BNP 1,867*     Coagulation: No results for input(s): "PT", "INR", "APTT" in the last 48 hours.    Significant Imaging: I have reviewed all pertinent imaging results/findings within the past 24 " hours.    Assessment/Plan:      * Congestive heart failure  She has combined systolic and diastolic acute on chronic heart failure   BNP elevated  Continue IV lasix and aldactone   S/p Metolazone 5 mg once   cont. Her clonidine patch   Strict intake output and daily weight      CKD (chronic kidney disease), stage IV  Single  kidney  with stage IV CKD now   Hold lokema for now   Nephrology Cx as per patient request  On IV lasix    Paroxysmal atrial fibrillation  Resume coreg bid and  Eliquis  Thyroid Bx not ready yet    Thyroid nodule  Had FNA 2 days back   Eliquis was on hold and resume tomorrow       Type 2 diabetes mellitus with microalbuminuria, without long-term current use of insulin  For now , no meds.  No insulin .   SSI      Essential hypertension  Continue home medications  Blood pressure has been stabilized      VTE Risk Mitigation (From admission, onward)           Ordered     apixaban tablet 5 mg  2 times daily         03/15/24 0244     Reason for no Mechanical VTE Prophylaxis  Once        Question:  Reasons:  Answer:  Risk of Bleeding    03/15/24 0221     IP VTE HIGH RISK PATIENT  Once         03/15/24 0221     Place sequential compression device  Until discontinued         03/15/24 0221                    Discharge Planning   HARINI:      Code Status: Full Code   Is the patient medically ready for discharge?:     Reason for patient still in hospital (select all that apply): Treatment                     Manuel Christensen MD  Department of Hospital Medicine   [facility]

## 2024-03-16 NOTE — PLAN OF CARE
Problem: Adult Inpatient Plan of Care  Goal: Plan of Care Review  Outcome: Ongoing, Progressing  Goal: Patient-Specific Goal (Individualized)  Outcome: Ongoing, Progressing  Goal: Absence of Hospital-Acquired Illness or Injury  Outcome: Ongoing, Progressing  Goal: Optimal Comfort and Wellbeing  Outcome: Ongoing, Progressing  Goal: Readiness for Transition of Care  Outcome: Ongoing, Progressing     Problem: Bariatric Environmental Safety  Goal: Safety Maintained with Care  Outcome: Ongoing, Progressing     Problem: Diabetes Comorbidity  Goal: Blood Glucose Level Within Targeted Range  Outcome: Ongoing, Progressing     Problem: Fall Injury Risk  Goal: Absence of Fall and Fall-Related Injury  Outcome: Ongoing, Progressing     Problem: Impaired Wound Healing  Goal: Optimal Wound Healing  Outcome: Ongoing, Progressing

## 2024-03-16 NOTE — NURSING
Pt reports having cramping in her hands, she states its from taking large amounts of diuretics, she reports taking magnesium po prn to help with cramps, message sent to Dr Gil, new order noted

## 2024-03-16 NOTE — PLAN OF CARE
[facility]  Initial Discharge Assessment       Primary Care Provider: Tony Sadler MD    Admission Diagnosis: Acute on chronic combined systolic and diastolic congestive heart failure [I50.43]    Admission Date: 3/14/2024  Expected Discharge Date:     Transition of Care Barriers: None    Payor: MEDICARE / Plan: MEDICARE PART A & B / Product Type: Government /     Extended Emergency Contact Information  Primary Emergency Contact: Maria Luisa López   [place]  Mobile Phone: [phone]  Relation: Daughter    Discharge Plan A: Home with family  Discharge Plan B: Home with family      [id number] - [place] - [address]  [address]  [facility] [address]  Phone: [phone] Fax: [phone]    [facility] - [place] - [address]  [address]  [facility] [address]  Phone: [phone] Fax: [phone]    [id number] - [place], [place] - [address] [facility]  [address] [facility] [address]  Phone: [phone] Fax: [phone]    CM completed discharge assessment with patient. Pt AAOx4s. Pt lives with daughter. Demographics, PCP, and insurance verified. No home health. No dialysis. DME - cane & shower chair. No LW or POA. Pt completes ADLs without assistance. Pt to discharge home via family transport. Pt has no other needs to be addressed at this time.    Initial Assessment (most recent)       Adult Discharge Assessment - 03/16/24 1347          Discharge Assessment    Assessment Type Discharge Planning Assessment     Confirmed/corrected address, phone number and insurance Yes     Confirmed Demographics Correct on Facesheet     Source of Information patient     When was your last doctors appointment? --   February 2024    Communicated HARINI with patient/caregiver Date not available/Unable to determine     Reason For Admission CHF     People in Home child(stan), adult     Do you expect to return to your current living situation? Yes      Do you have help at home or someone to help you manage your care at home? No     Prior to hospitilization cognitive status: Alert/Oriented     Current cognitive status: Alert/Oriented     Walking or Climbing Stairs Difficulty no     Dressing/Bathing Difficulty no     Home Layout Able to live on 1st floor     Equipment Currently Used at Home cane, straight;shower chair     Patient currently being followed by outpatient case management? No     Do you currently have service(s) that help you manage your care at home? No     Do you take prescription medications? Yes     Do you have prescription coverage? Yes     Coverage Medicare, Medicaid     Do you have any problems affording any of your prescribed medications? No     Is the patient taking medications as prescribed? yes     Who is going to help you get home at discharge? daughter     How do you get to doctors appointments? family or friend will provide     Are you on dialysis? No     Do you take coumadin? No     Discharge Plan A Home with family     Discharge Plan B Home with family     DME Needed Upon Discharge  none     Discharge Plan discussed with: Patient     Transition of Care Barriers None

## 2024-03-16 NOTE — CONSULTS
INPATIENT NEPHROLOGY CONSULT   [facility]    Patient Name: Juhi Taylor  Date: 03/16/2024    Reason for consultation:    Chief Complaint:   Chief Complaint   Patient presents with    Shortness of Breath    Leg Swelling       History of Present Illness:  73 BF with pmh of HTN , CKD IV , renal CA with nephrectomy , DMII not on insulin , afib on eliquis , cardiomyopathy with 40% EF and CAD with stents - f/b Dr. Martinez- admitted with SAMPSON, orthopnea, anasarca, weight gain and decreased urination despite oral diuretic regimen- admit BP > 200/90, CXR with acute pulm edema- consulted for renal management.    Interval History:  3/16- hypertensive at admission, on RA, BNP 1800, trop leak, very edematous    Plan of Care:    Assessment:  PEACE/CKD V due to CRS  HTN emergency/Decompensated S CHF with acute pulm edema/Anasarca  SHPT  Anemia of CKD    Plan:    - continue lasix 40mg IV BID and add metolazone 5mg daily  - with such a reduced eGFR and from what I understand but will need to delve in further no interest in RRT, wouldn't risk PEACE/hyperkalemia with aldactone- will stop therapy  - ok with clonidine patch, norasc, coreg- BP is coming down  - renal diet, add 1.5L fluid restriction  - continue calcitriol  - no acute PONCE needs    Thank you for allowing us to participate in this patient's care. We will continue to follow.    Vital Signs:  Temp Readings from Last 3 Encounters:   03/16/24 97.8 °F (36.6 °C) (Oral)   01/31/24 98.1 °F (36.7 °C) (Oral)   08/22/23 98.5 °F (36.9 °C) (Oral)       Pulse Readings from Last 3 Encounters:   03/16/24 108   01/31/24 66   11/15/23 (!) 58       BP Readings from Last 3 Encounters:   03/16/24 (!) 140/85   01/31/24 (!) 140/90   11/15/23 122/80       Weight:  Wt Readings from Last 3 Encounters:   03/15/24 (!) 137.9 kg (304 lb)   01/31/24 131.9 kg (290 lb 12.6 oz)   11/15/23 130.7 kg (288 lb 4 oz)       INS/OUTS:  I/O last 3 completed shifts:  In: -   Out: 2100  "[Urine:2100]  I/O this shift:  In: 120 [P.O.:120]  Out: -     Past Medical & Surgical History:  Past Medical History:   Diagnosis Date    Anticoagulant long-term use     Arthritis     Breast cancer 2014    invasive lobular carcinoma    Cancer of kidney 11/2020    RIGHT KIDNEY CANCER    CHF (congestive heart failure)     Coronary artery disease dx 2005    Depression     Diabetes mellitus     Diastolic heart failure secondary to hypertension     Gout     Hyperlipemia     Hypertension     Hypertrophy of nasal turbinates     Kidney mass 2020    Right    Levoscoliosis     Lung nodule     left    Multiple thyroid nodules     NICOLE (obstructive sleep apnea)     uses C-PAP    Pulmonary hypertension        Past Surgical History:   Procedure Laterality Date    AORTOGRAPHY N/A 12/04/2020    Procedure: Aortogram;  Surgeon: Paul Pedersen MD;  Location: [facility] CATH;  Service: Cardiology;  Laterality: N/A;    BREAST SURGERY      CARDIAC CATHETERIZATION  12/2020    CHOLECYSTECTOMY      COLONOSCOPY      multi -last 2014     CORONARY ARTERY BYPASS GRAFT      ESOPHAGOGASTRODUODENOSCOPY      2012     FRACTURE SURGERY      HAND SURGERY Right     HYSTERECTOMY      LAPAROSCOPIC ROBOT-ASSISTED SURGICAL REMOVAL OF KIDNEY USING DA CHELLE XI Right 03/10/2022    Procedure: XI ROBOTIC NEPHRECTOMY- radical;  Surgeon: Rolando Ramirez MD;  Location: [facility] OR;  Service: Urology;  Laterality: Right;    MASTECTOMY W/ SENTINEL NODE BIOPSY Bilateral 01/21/2015    bilateral "dog ears"    NASAL SINUS SURGERY  2015    Dr Bryant FESS/cauterization turbinate     PARTIAL HYSTERECTOMY  1989    PERCUTANEOUS TRANSLUMINAL BALLOON ANGIOPLASTY OF CORONARY ARTERY  12/04/2020    Procedure: Angioplasty-coronary;  Surgeon: Paul Pedersen MD;  Location: [facility] CATH;  Service: Cardiology;;    RENAL BIOPSY Right     9/20/2021 EJ    TUBAL LIGATION      ULTRASOUND GUIDANCE  12/04/2020    Procedure: Ultrasound Guidance;  Surgeon: Paul Pedersen MD;  Location: [facility] CATH;  " Service: Cardiology;;       Past Social History:  Social History     Socioeconomic History    Marital status: Single    Number of children: 4   Occupational History    Occupation: retired Psych aid    Tobacco Use    Smoking status: Former     Current packs/day: 0.00     Types: Cigarettes     Start date: 2016     Quit date: 2016     Years since quittin.2    Smokeless tobacco: Never    Tobacco comments:     quit    Substance and Sexual Activity    Alcohol use: No    Drug use: No    Sexual activity: Not Currently     Partners: Male     Birth control/protection: None     Social Determinants of Health     Financial Resource Strain: Medium Risk (2023)    Overall Financial Resource Strain (CARDIA)     Difficulty of Paying Living Expenses: Somewhat hard   Food Insecurity: Food Insecurity Present (2023)    Hunger Vital Sign     Worried About Running Out of Food in the Last Year: Sometimes true     Ran Out of Food in the Last Year: Never true   Transportation Needs: No Transportation Needs (2023)    PRAPARE - Transportation     Lack of Transportation (Medical): No     Lack of Transportation (Non-Medical): No   Physical Activity: Inactive (2023)    Exercise Vital Sign     Days of Exercise per Week: 0 days     Minutes of Exercise per Session: 0 min   Stress: No Stress Concern Present (2023)    [employer] - Occupational Stress Questionnaire     Feeling of Stress : Only a little   Social Connections: Unknown (2023)    Social Connection and Isolation Panel [NHANES]     Frequency of Communication with Friends and Family: Patient declined     Frequency of Social Gatherings with Friends and Family: Patient declined     Active Member of Clubs or Organizations: Yes     Attends Club or Organization Meetings: Patient declined     Marital Status: Patient declined   Housing Stability: Unknown (2023)    Housing Stability Vital Sign     Unable to  Pay for Housing in the Last Year: No     Unstable Housing in the Last Year: No       Medications:  Scheduled Meds:   amLODIPine  10 mg Oral Daily    apixaban  5 mg Oral BID    calcitRIOL  0.5 mcg Oral Daily    carvediloL  25 mg Oral BID    cetirizine  10 mg Oral Daily    cloNIDine 0.1 mg/24 hr td ptwk  1 patch Transdermal Q7 Days    furosemide (LASIX) injection  40 mg Intravenous Q12H    spironolactone  12.5 mg Oral Daily     Continuous Infusions:  PRN Meds:.acetaminophen, hydrALAZINE, magnesium oxide, ondansetron, sodium chloride 0.9%  No current facility-administered medications on file prior to encounter.     Current Outpatient Medications on File Prior to Encounter   Medication Sig Dispense Refill    allopurinoL (ZYLOPRIM) 300 MG tablet Take 1 tablet (300 mg total) by mouth once daily. 90 tablet 3    amLODIPine (NORVASC) 10 MG tablet Take 10 mg by mouth once daily.      calcitRIOL (ROCALTROL) 0.5 MCG Cap Take 0.5 mcg by mouth once daily.      carvediloL (COREG) 25 MG tablet TAKE ONE TABLET BY MOUTH TWICE DAILY (Patient taking differently: Take 25 mg by mouth 2 (two) times daily.) 180 tablet 2    cloNIDine (CATAPRES) 0.1 MG tablet Take 1 tablet (0.1 mg total) by mouth 3 (three) times daily as needed (PRN SBP > 165 mmHg). 90 tablet 6    cyanocobalamin (VITAMIN B-12) 100 MCG tablet Take 100 mcg by mouth once daily.      evolocumab (REPATHA SURECLICK) 140 mg/mL PnIj Inject 1 mL (140 mg total) into the skin every 14 (fourteen) days. 2 mL 11    furosemide (LASIX) 40 MG tablet Take 40 mg by mouth once daily.      loratadine (CLARITIN) 10 mg tablet Take 10 mg by mouth once daily.      magnesium oxide (MAG-OX) 400 mg (241.3 mg magnesium) tablet Take 1 tablet (400 mg total) by mouth daily as needed (cramping). (Patient taking differently: Take 400 mg by mouth once daily.) 30 tablet 0    metOLazone (ZAROXOLYN) 2.5 MG tablet Take 2.5 mg by mouth nightly.      nitroGLYCERIN (NITROSTAT) 0.4 MG SL tablet Place 1 tablet (0.4 mg  total) under the tongue every 5 (five) minutes as needed for Chest pain. 25 tablet 0    spironolactone (ALDACTONE) 25 MG tablet Take 0.5 tablets (12.5 mg total) by mouth once daily. 30 tablet 11    apixaban (ELIQUIS) 5 mg Tab Take 1 tablet (5 mg total) by mouth 2 (two) times daily. (Patient not taking: Reported on 3/14/2024) 180 tablet 0    blood sugar diagnostic (TRUE METRIX GLUCOSE TEST STRIP) Strp Use 1x daily. Insurance preferred. 100 strip 3    blood sugar diagnostic Strp To check BG 1 time daily, to use with insurance preferred meter 100 strip 3    cloNIDine 0.1 mg/24 hr td ptwk (CATAPRES) 0.1 mg/24 hr Place 1 patch onto the skin every 7 days. (Patient taking differently: Place 1 patch onto the skin every 7 days. TUESDAYS) 4 patch 4    glipiZIDE 5 MG TR24 Take 2 tablets (10 mg total) by mouth daily with breakfast. (Patient not taking: Reported on 3/14/2024) 180 tablet 3    lancets Misc To check BG 1 times daily, to use with insurance preferred meter 100 each 3    lancets Misc To check BG 1 times daily, to use with insurance preferred meter 100 each 3    latanoprost 0.005 % ophthalmic solution Place 1 drop into both eyes every evening. (Patient not taking: Reported on 3/14/2024) 7.5 mL 3    [DISCONTINUED] aspirin (ECOTRIN) 81 MG EC tablet Take 1 tablet (81 mg total) by mouth once daily. 90 tablet 3    [DISCONTINUED] DULoxetine (CYMBALTA) 20 MG capsule TAKE ONE CAPSULE BY MOUTH ONCE DAILY 30 capsule 4    [DISCONTINUED] folic acid (FOLVITE) 1 MG tablet Take 1 mg by mouth once daily.      [DISCONTINUED] metFORMIN (GLUCOPHAGE-XR) 500 MG ER 24hr tablet Take 2 tablets (1,000 mg total) by mouth 2 (two) times daily with meals. 360 tablet 3    [DISCONTINUED] sodium bicarbonate 650 MG tablet Take 1 tablet (650 mg total) by mouth once daily. 30 tablet 11       Allergies:  Percocet [oxycodone-acetaminophen], Amiodarone analogues, Irbesartan, Januvia [sitagliptin], Jardiance [empagliflozin], Lipitor [atorvastatin],  "Linaclotide, and Lubiprostone    Past Family History:  Reviewed; refer to Hospitalist Admission Note    Review of Systems:  Review of Systems - All 14 systems reviewed and negative, except as noted in HPI    Physical Exam:  BP (!) 140/85 (BP Location: Right arm, Patient Position: Sitting)   Pulse 108   Temp 97.8 °F (36.6 °C) (Oral)   Resp 18   Ht 5' 7" (1.702 m)   Wt (!) 137.9 kg (304 lb)   SpO2 (!) 92%   BMI 47.61 kg/m²     General Appearance:    NAD, AAO x 3, cooperative, appears stated age   Head:    Normocephalic, atraumatic   Eyes:    PER, EOMI, and conjunctiva/sclera clear bilaterally        Mouth:   Moist mucus membranes, no thrush or oral lesions, normal      dentition   Back:     No CVA tenderness   Lungs:     Clear to auscultation bilaterally, no wheeze, crackles, rales      or rhonchi, symmetric air movement, respirations unlabored   Heart:    Regular rate and rhythm, S1 and S2 normal, no murmur, rub   or gallop   Abdomen:     Soft, non-tender, non-distended, bowel sounds active all four   quadrants, no RT or guarding, no masses, no organomegaly   Extremities:   Warm and well perfused, anasarca   MSK:   No joint or muscle swelling, tenderness or deformity   Skin:   Skin color, texture, turgor normal, no rashes or lesions   Neurologic/Psychiatric:   CNII-XII intact, normal strength and sensation       throughout, no asterixis; normal affect, memory, judgement     and insight     Results:  Lab Results   Component Value Date     03/16/2024    K 4.6 03/16/2024     03/16/2024    CO2 26 03/16/2024    BUN 87 (H) 03/16/2024    CREATININE 3.6 (H) 03/16/2024    CALCIUM 9.9 03/16/2024    ANIONGAP 8 03/16/2024    ESTGFRAFRICA 16.2 (A) 07/15/2022    EGFRNONAA 14.0 (A) 07/15/2022       Lab Results   Component Value Date    CALCIUM 9.9 03/16/2024    PHOS 3.9 01/22/2024       No results for input(s): "WBC", "RBC", "HGB", "HCT", "PLT", "MCV", "MCH", "MCHC" in the last 24 hours.    I have personally " reviewed pertinent radiological imaging and reports.    I have spent > 70 minutes providing care for this patient for the above diagnoses. These services have included chart/data/imaging review, evaluation, exam, formulation of plan, , note preparation, and discussions with staff involved in this patient's care.    Hector Blakely MD MPH  [facility]  [address] [place] [id number]  [phone] (p)  [phone] (f)

## 2024-03-16 NOTE — NURSING
Nurses Note -- 4 Eyes      3/15/2024   9:19 PM      Skin assessed during: Admit      [] No Altered Skin Integrity Present    []Prevention Measures Documented      [x] Yes- Altered Skin Integrity Present or Discovered   [x] LDA Added if Not in Epic (Describe Wound)   [x] New Altered Skin Integrity was Present on Admit and Documented in LDA   [x] Wound Image Taken    Wound Care Consulted? No    Attending Nurse:  Edita Strickland RN/Staff Member:[id number]

## 2024-03-16 NOTE — NURSING
pt is requesting to have her nephrologist consulted, Dr Betts, regarding her lasix dose. She says she will only take 40 mg while she is here because her nephrologist told her that 80 mg was too much for her kidneys, message sent to Dr Jeffery MD stated its ok for her to take the 40 mg IV q 8 hours and the nephrologist will have to be contacted during the day, pt made aware, new orders noted

## 2024-03-16 NOTE — CONSULTS
"[facility]  Adult Nutrition   Consult Note (Nutrition Education)     SUMMARY     Recommendations  1.) Will update diet to renal, low protein, diabetic diet restrictions.  2.) Suggest decreasing calcitriol to 0.25mcg QD for hypercalcemia.   3.) Provided pt with verbal and written diet instruction on CKD3-5 (low Na, low phos), DM diet education.    Dietitian Rounds Brief  Consult received for diet education. Pt with multiple cardiac diet educations in the past: 8/2023, 3/2022, 11/2021 x2. Current diet of diabetic diet. Pt reports not using insulin at home and not receiving here. She denies checking BG levels often. Noted CKD IV c/o cramping. Noted Ca 9.9, Corrected Ca 10.2 receiving calcitriol 0.50mcg QD. Provided verbal and written CKD3-5 (focused on low Na, low phos), diabetic diet education to aid in preserving kidney function and delaying HD initiation. RD contact information provided.    Anthropometrics  Temp: 98 °F (36.7 °C)  Height: 5' 7" (170.2 cm)  Height (inches): 67 in  Weight Method: Standard Scale  Weight: (!) 137.9 kg (304 lb)  Weight (lb): (!) 304 lb  Ideal Body Weight (IBW), Female: 135 lb  % Ideal Body Weight, Female (lb): 225.19 %  BMI (Calculated): 47.6  BMI Grade: greater than 40 - morbid obesity    Reason for Assessment  Reason For Assessment: consult (education)  Diagnosis: cardiac disease  Relevant Medical History: HTN, CKD IV, renal CA with nephrectomy, DMII not on insulin, afib on eliquis, cardiomyopathy with 40% EF and CAD with stents    Nutrition Risk Screen  Nutrition Risk Screen: no indicators present     MST Score: 0  Have you recently lost weight without trying?: No  Weight loss score: 0  Have you been eating poorly because of a decreased appetite?: No  Appetite score: 0     Weight History:  Wt Readings from Last 10 Encounters:   03/15/24 (!) 137.9 kg (304 lb)   01/31/24 131.9 kg (290 lb 12.6 oz)   11/15/23 130.7 kg (288 lb 4 oz)   09/15/23 131.1 kg (288 lb 14.6 oz) "   08/20/23 128.7 kg (283 lb 10 oz)   08/21/23 128.7 kg (283 lb 11.7 oz)   07/10/23 126.9 kg (279 lb 10.5 oz)   06/26/23 127.8 kg (281 lb 11.2 oz)   03/10/23 127.8 kg (281 lb 12 oz)   01/16/23 131.6 kg (290 lb 3.2 oz)      Lab/Procedures/Meds: Pertinent Labs/Meds Reviewed    Nutrition Follow-Up  RD Follow-up?: Yes      Irais Ray RD 03/16/2024 10:44 AM

## 2024-03-17 LAB
ANION GAP SERPL CALC-SCNC: 7 MMOL/L (ref 8–16)
BUN SERPL-MCNC: 100 MG/DL (ref 8–23)
CALCIUM SERPL-MCNC: 9.1 MG/DL (ref 8.7–10.5)
CHLORIDE SERPL-SCNC: 105 MMOL/L (ref 95–110)
CO2 SERPL-SCNC: 28 MMOL/L (ref 23–29)
CREAT SERPL-MCNC: 3.9 MG/DL (ref 0.5–1.4)
EST. GFR  (NO RACE VARIABLE): 11.8 ML/MIN/1.73 M^2
GLUCOSE SERPL-MCNC: 124 MG/DL (ref 70–110)
POTASSIUM SERPL-SCNC: 4.1 MMOL/L (ref 3.5–5.1)
SODIUM SERPL-SCNC: 140 MMOL/L (ref 136–145)

## 2024-03-17 PROCEDURE — 80048 BASIC METABOLIC PNL TOTAL CA: CPT | Performed by: INTERNAL MEDICINE

## 2024-03-17 PROCEDURE — 12000002 HC ACUTE/MED SURGE SEMI-PRIVATE ROOM

## 2024-03-17 PROCEDURE — 36415 COLL VENOUS BLD VENIPUNCTURE: CPT | Performed by: INTERNAL MEDICINE

## 2024-03-17 PROCEDURE — 25000003 PHARM REV CODE 250: Performed by: INTERNAL MEDICINE

## 2024-03-17 PROCEDURE — 63600175 PHARM REV CODE 636 W HCPCS: Performed by: INTERNAL MEDICINE

## 2024-03-17 PROCEDURE — 25000003 PHARM REV CODE 250: Performed by: HOSPITALIST

## 2024-03-17 RX ORDER — TORSEMIDE 20 MG/1
20 TABLET ORAL 2 TIMES DAILY
Status: DISCONTINUED | OUTPATIENT
Start: 2024-03-17 | End: 2024-03-18 | Stop reason: HOSPADM

## 2024-03-17 RX ORDER — BISACODYL 5 MG
5 TABLET, DELAYED RELEASE (ENTERIC COATED) ORAL DAILY PRN
Status: DISCONTINUED | OUTPATIENT
Start: 2024-03-17 | End: 2024-03-18 | Stop reason: HOSPADM

## 2024-03-17 RX ADMIN — CALCITRIOL CAPSULES 0.25 MCG 0.5 MCG: 0.25 CAPSULE ORAL at 08:03

## 2024-03-17 RX ADMIN — APIXABAN 5 MG: 5 TABLET, FILM COATED ORAL at 08:03

## 2024-03-17 RX ADMIN — FUROSEMIDE 40 MG: 10 INJECTION, SOLUTION INTRAMUSCULAR; INTRAVENOUS at 08:03

## 2024-03-17 RX ADMIN — AMLODIPINE BESYLATE 10 MG: 5 TABLET ORAL at 08:03

## 2024-03-17 RX ADMIN — CARVEDILOL 25 MG: 25 TABLET, FILM COATED ORAL at 08:03

## 2024-03-17 RX ADMIN — METOLAZONE 5 MG: 2.5 TABLET ORAL at 08:03

## 2024-03-17 RX ADMIN — BISACODYL 5 MG: 5 TABLET, COATED ORAL at 10:03

## 2024-03-17 RX ADMIN — TORSEMIDE 20 MG: 20 TABLET ORAL at 04:03

## 2024-03-17 RX ADMIN — Medication 400 MG: at 04:03

## 2024-03-17 RX ADMIN — BISACODYL 5 MG: 5 TABLET, COATED ORAL at 08:03

## 2024-03-17 RX ADMIN — TORSEMIDE 20 MG: 20 TABLET ORAL at 10:03

## 2024-03-17 RX ADMIN — CETIRIZINE HYDROCHLORIDE 10 MG: 10 TABLET, FILM COATED ORAL at 08:03

## 2024-03-17 NOTE — ASSESSMENT & PLAN NOTE
Single  kidney  with stage IV CKD now   Dc [facility]   Nephrology follow up    IV lasix changed to torsemide   Repeat renal function tomorrow and possible DC tomorrow

## 2024-03-17 NOTE — ASSESSMENT & PLAN NOTE
She has combined systolic and diastolic acute on chronic heart failure   BNP elevated  Continue torsemide and  Metolazone 5 mg daily   cont. Her clonidine patch   Strict intake output and daily weight  Appear stable and possible DC  tomorrow

## 2024-03-17 NOTE — SUBJECTIVE & OBJECTIVE
"Interval History:     No chest pain shortness for breath, lung exam with very minimal rales      Review of Systems  Objective:     Vital Signs (Most Recent):  Temp: 98.1 °F (36.7 °C) (03/17/24 0815)  Pulse: (!) 59 (03/17/24 0815)  Resp: 18 (03/17/24 0815)  BP: (!) 146/73 (03/17/24 0842)  SpO2: (!) 92 % (03/17/24 0815) Vital Signs (24h Range):  Temp:  [97.8 °F (36.6 °C)-98.4 °F (36.9 °C)] 98.1 °F (36.7 °C)  Pulse:  [] 59  Resp:  [18] 18  SpO2:  [92 %-95 %] 92 %  BP: (120-160)/(59-85) 146/73     Weight: (!) 136.8 kg (301 lb 9.6 oz)  Body mass index is 47.24 kg/m².    Intake/Output Summary (Last 24 hours) at 3/17/2024 1212  Last data filed at 3/17/2024 0424  Gross per 24 hour   Intake 240 ml   Output 2200 ml   Net -1960 ml         Physical Exam  Vitals and nursing note reviewed.   HENT:      Head: Normocephalic and atraumatic.   Eyes:      Conjunctiva/sclera: Conjunctivae normal.   Neck:      Vascular: No JVD.   Cardiovascular:      Rate and Rhythm: Normal rate.      Heart sounds: Normal heart sounds.   Pulmonary:      Effort: Pulmonary effort is normal.      Breath sounds: Normal breath sounds.   Abdominal:      Palpations: Abdomen is soft.   Skin:     General: Skin is warm.   Neurological:      Mental Status: She is alert and oriented to person, place, and time.   Psychiatric:         Mood and Affect: Mood normal.             Significant Labs: All pertinent labs within the past 24 hours have been reviewed.  BMP:   Recent Labs   Lab 03/17/24  0701   *      K 4.1      CO2 28   *   CREATININE 3.9*   CALCIUM 9.1     CBC: No results for input(s): "WBC", "HGB", "HCT", "PLT" in the last 48 hours.  CMP:   Recent Labs   Lab 03/16/24  0438 03/17/24  0701    140   K 4.6 4.1    105   CO2 26 28   * 124*   BUN 87* 100*   CREATININE 3.6* 3.9*   CALCIUM 9.9 9.1   ANIONGAP 8 7*       Significant Imaging: I have reviewed all pertinent imaging results/findings within the past 24 " hours.

## 2024-03-17 NOTE — PROGRESS NOTES
INPATIENT NEPHROLOGY Progress Note  [facility]    Patient Name: Juhi Taylor  Date: 03/17/2024    Reason for consultation:    Chief Complaint:   Chief Complaint   Patient presents with    Shortness of Breath    Leg Swelling       History of Present Illness:  73 BF with pmh of HTN , CKD IV , renal CA with nephrectomy , DMII not on insulin , afib on eliquis , cardiomyopathy with 40% EF and CAD with stents - f/b Dr. Martinez- admitted with SAMPSON, orthopnea, anasarca, weight gain and decreased urination despite oral diuretic regimen- admit BP > 200/90, CXR with acute pulm edema- consulted for renal management.    Interval History:  3/16- hypertensive at admission, on RA, BNP 1800, trop leak, very edematous  3/17- BP improved, on RA, UOP 2.2L    Plan of Care:    Assessment:  PAECE/CKD V due to CRS  HTN emergency/Decompensated S CHF with acute pulm edema/Anasarca  SHPT  Anemia of CKD    Plan:    - SCr bumped- switch IV lasix to PO torsemide and continue metolazone 5mg daily  - with such a reduced eGFR, wouldn't risk PEACE/hyperkalemia with aldactone- hold therapy  - ok with clonidine patch, norasc, coreg- BP is coming down  - renal diet, 1.5L fluid restriction  - continue calcitriol  - no acute PONCE needs    I did not have an opportunity to discuss her interest in RRT- this is something she can discuss with Dr. CIFUENTES at outpatient follow up.    Thank you for allowing us to participate in this patient's care. We will continue to follow.    Vital Signs:  Temp Readings from Last 3 Encounters:   03/17/24 98.1 °F (36.7 °C) (Oral)   01/31/24 98.1 °F (36.7 °C) (Oral)   08/22/23 98.5 °F (36.9 °C) (Oral)       Pulse Readings from Last 3 Encounters:   03/17/24 (!) 59   01/31/24 66   11/15/23 (!) 58       BP Readings from Last 3 Encounters:   03/17/24 (!) 146/73   01/31/24 (!) 140/90   11/15/23 122/80       Weight:  Wt Readings from Last 3 Encounters:   03/16/24 (!) 136.8 kg (301 lb 9.6 oz)   01/31/24 131.9 kg (290  lb 12.6 oz)   11/15/23 130.7 kg (288 lb 4 oz)       INS/OUTS:  I/O last 3 completed shifts:  In: 360 [P.O.:360]  Out: 3300 [Urine:3300]  No intake/output data recorded.    Medications:  Scheduled Meds:   amLODIPine  10 mg Oral Daily    apixaban  5 mg Oral BID    calcitRIOL  0.5 mcg Oral Daily    carvediloL  25 mg Oral BID    cetirizine  10 mg Oral Daily    cloNIDine 0.1 mg/24 hr td ptwk  1 patch Transdermal Q7 Days    furosemide (LASIX) injection  40 mg Intravenous Q12H    metOLazone  5 mg Oral Daily     Continuous Infusions:  PRN Meds:.acetaminophen, hydrALAZINE, magnesium oxide, ondansetron, sodium chloride 0.9%  No current facility-administered medications on file prior to encounter.     Current Outpatient Medications on File Prior to Encounter   Medication Sig Dispense Refill    allopurinoL (ZYLOPRIM) 300 MG tablet Take 1 tablet (300 mg total) by mouth once daily. 90 tablet 3    amLODIPine (NORVASC) 10 MG tablet Take 10 mg by mouth once daily.      calcitRIOL (ROCALTROL) 0.5 MCG Cap Take 0.5 mcg by mouth once daily.      carvediloL (COREG) 25 MG tablet TAKE ONE TABLET BY MOUTH TWICE DAILY (Patient taking differently: Take 25 mg by mouth 2 (two) times daily.) 180 tablet 2    cloNIDine (CATAPRES) 0.1 MG tablet Take 1 tablet (0.1 mg total) by mouth 3 (three) times daily as needed (PRN SBP > 165 mmHg). 90 tablet 6    cyanocobalamin (VITAMIN B-12) 100 MCG tablet Take 100 mcg by mouth once daily.      evolocumab (REPATHA SURECLICK) 140 mg/mL PnIj Inject 1 mL (140 mg total) into the skin every 14 (fourteen) days. 2 mL 11    furosemide (LASIX) 40 MG tablet Take 40 mg by mouth once daily.      loratadine (CLARITIN) 10 mg tablet Take 10 mg by mouth once daily.      magnesium oxide (MAG-OX) 400 mg (241.3 mg magnesium) tablet Take 1 tablet (400 mg total) by mouth daily as needed (cramping). (Patient taking differently: Take 400 mg by mouth once daily.) 30 tablet 0    metOLazone (ZAROXOLYN) 2.5 MG tablet Take 2.5 mg by mouth  nightly.      nitroGLYCERIN (NITROSTAT) 0.4 MG SL tablet Place 1 tablet (0.4 mg total) under the tongue every 5 (five) minutes as needed for Chest pain. 25 tablet 0    spironolactone (ALDACTONE) 25 MG tablet Take 0.5 tablets (12.5 mg total) by mouth once daily. 30 tablet 11    apixaban (ELIQUIS) 5 mg Tab Take 1 tablet (5 mg total) by mouth 2 (two) times daily. (Patient not taking: Reported on 3/14/2024) 180 tablet 0    blood sugar diagnostic (TRUE METRIX GLUCOSE TEST STRIP) Strp Use 1x daily. Insurance preferred. 100 strip 3    blood sugar diagnostic Strp To check BG 1 time daily, to use with insurance preferred meter 100 strip 3    cloNIDine 0.1 mg/24 hr td ptwk (CATAPRES) 0.1 mg/24 hr Place 1 patch onto the skin every 7 days. (Patient taking differently: Place 1 patch onto the skin every 7 days. TUESDAYS) 4 patch 4    glipiZIDE 5 MG TR24 Take 2 tablets (10 mg total) by mouth daily with breakfast. (Patient not taking: Reported on 3/14/2024) 180 tablet 3    lancets Misc To check BG 1 times daily, to use with insurance preferred meter 100 each 3    lancets Misc To check BG 1 times daily, to use with insurance preferred meter 100 each 3    latanoprost 0.005 % ophthalmic solution Place 1 drop into both eyes every evening. (Patient not taking: Reported on 3/14/2024) 7.5 mL 3    [DISCONTINUED] aspirin (ECOTRIN) 81 MG EC tablet Take 1 tablet (81 mg total) by mouth once daily. 90 tablet 3    [DISCONTINUED] DULoxetine (CYMBALTA) 20 MG capsule TAKE ONE CAPSULE BY MOUTH ONCE DAILY 30 capsule 4    [DISCONTINUED] folic acid (FOLVITE) 1 MG tablet Take 1 mg by mouth once daily.      [DISCONTINUED] metFORMIN (GLUCOPHAGE-XR) 500 MG ER 24hr tablet Take 2 tablets (1,000 mg total) by mouth 2 (two) times daily with meals. 360 tablet 3    [DISCONTINUED] sodium bicarbonate 650 MG tablet Take 1 tablet (650 mg total) by mouth once daily. 30 tablet 11       Review of Systems:  neg    Physical Exam:  BP (!) 146/73   Pulse (!) 59   Temp  "98.1 °F (36.7 °C) (Oral)   Resp 18   Ht 5' 7" (1.702 m)   Wt (!) 136.8 kg (301 lb 9.6 oz)   SpO2 (!) 92%   BMI 47.24 kg/m²     General Appearance:    NAD, AAO x 3, cooperative, appears stated age   Head:    Normocephalic, atraumatic   Eyes:    PER, EOMI, and conjunctiva/sclera clear bilaterally        Mouth:   Moist mucus membranes, no thrush or oral lesions, normal      dentition   Back:     No CVA tenderness   Lungs:     Clear to auscultation bilaterally, no wheeze, crackles, rales      or rhonchi, symmetric air movement, respirations unlabored   Heart:    Regular rate and rhythm, S1 and S2 normal, no murmur, rub   or gallop   Abdomen:     Soft, non-tender, non-distended, bowel sounds active all four   quadrants, no RT or guarding, no masses, no organomegaly   Extremities:   Warm and well perfused, anasarca   MSK:   No joint or muscle swelling, tenderness or deformity   Skin:   Skin color, texture, turgor normal, no rashes or lesions   Neurologic/Psychiatric:   CNII-XII intact, normal strength and sensation       throughout, no asterixis; normal affect, memory, judgement     and insight     Results:  Lab Results   Component Value Date     03/17/2024    K 4.1 03/17/2024     03/17/2024    CO2 28 03/17/2024     (H) 03/17/2024    CREATININE 3.9 (H) 03/17/2024    CALCIUM 9.1 03/17/2024    ANIONGAP 7 (L) 03/17/2024    ESTGFRAFRICA 16.2 (A) 07/15/2022    EGFRNONAA 14.0 (A) 07/15/2022       Lab Results   Component Value Date    CALCIUM 9.1 03/17/2024    PHOS 3.9 01/22/2024       No results for input(s): "WBC", "RBC", "HGB", "HCT", "PLT", "MCV", "MCH", "MCHC" in the last 24 hours.    I have personally reviewed pertinent radiological imaging and reports.    I have spent > 35 minutes providing care for this patient for the above diagnoses. These services have included chart/data/imaging review, evaluation, exam, formulation of plan, , note preparation, and discussions with staff involved in " this patient's care.    Hector Blakely MD MPH  [facility] [address] [address]  [phone] (p)  [phone] (f)

## 2024-03-17 NOTE — HOSPITAL COURSE
73 BF with pmh of HTN , CKD IV , renal CA with nephrectomy , DMII not on insulin , afib on eliquis , cardiomyopathy with 40% EF and CAD with stents - f/b Dr. Martinez- admitted with SAMPSON, orthopnea, anasarca, weight gain and decreased urination despite oral diuretic regimen along with hypertensive crisis.   admit BP > 200/90 and CXR with acute pulm edema and IV Lasix high-dose were given.  Later Aldactone was discontinued and increase the dose of her home medication metolazone to 5 mg daily.  Her kidney function was initially stable but later getting worse and IV was discontinued and changed to torsemide by Nephrology.  Her BUN is severely elevated and Nephrology wants to keep today and discharge tomorrow.  She had good diuresis nearly 5-6 L during the past 2 days and feeling better. Off note patient had FNA of the thyroid nodule and  and cytology report is not reported yet.     Patient had an increase in Cr, however still at baseline. Nephrology recommended that Patient be changed from IV Lasix to PO Torsemide and Dc'd Aldactone

## 2024-03-17 NOTE — PROGRESS NOTES
[facility] Medicine  Progress Note    Patient Name: Juhi Taylor  MRN: [id number]  Patient Class: IP- Inpatient   Admission Date: 3/14/2024  Length of Stay: 2 days  Attending Physician: Manuel Christensen MD  Primary Care Provider: Tony Sadler MD        Subjective:     Principal Problem:Congestive heart failure        HPI:  73 BF with pmh of HTN ,  CKD IV , renal CA with nephrectomy , DMII not on insulin , afib on eliquis , cardiomyopathy with 40% EF and CAD with stents       Non smoker  Non drinker    Lives at home with daughter      She comes in because of just not getting better she says.  She has felt bad now for several weeks if not longer.    She is so SOB she can't do much at all except lay around all day she says.     She is not noticing any more SOB laying down in bed.     She admits to more swelling  She thinks she is 20+ pounds over weight now from fluid   She has had CHF before and she takes lasix and aldactone at home alternating days       So she decided to come to our ER     In the ER her Creatinine was 3-4 range is normal now for her.    She had normal Cr back in 2022 but has gone up since    She has a nephrologist she follows with       She says she has not been urinating as much lately but she is taking her diuretics like she is supposed to be . Not missing doses.    Says her BP is controlled normally at home       In the ER her CXR shows CHF findings congestion     They gave her 40 mg IV lasix     She did make urine and said she felt better when I saw her       We are admitting her for CHF     Overview/Hospital Course:  73 BF with pmh of HTN , CKD IV , renal CA with nephrectomy , DMII not on insulin , afib on eliquis , cardiomyopathy with 40% EF and CAD with stents - f/b Dr. Martinez- admitted with SAMPSON, orthopnea, anasarca, weight gain and decreased urination despite oral diuretic regimen along with hypertensive crisis.   admit BP > 200/90  CXR with acute  pulm edema and IV Lasix high-dose were given.  Later Aldactone was discontinued and increase the dose of her home medication metolazone to 5 mg daily.  Her kidney function was initially stable but later getting worse and IV was discontinued and changed to torsemide by Nephrology.  Her BUN is severely elevated and Nephrology wants to keep today and discharge tomorrow.  She had good diuresis nearly 5-6 L during the past 2 days and feeling better       Interval History:     No chest pain shortness for breath, lung exam with very minimal rales      Review of Systems  Objective:     Vital Signs (Most Recent):  Temp: 98.1 °F (36.7 °C) (03/17/24 0815)  Pulse: (!) 59 (03/17/24 0815)  Resp: 18 (03/17/24 0815)  BP: (!) 146/73 (03/17/24 0842)  SpO2: (!) 92 % (03/17/24 0815) Vital Signs (24h Range):  Temp:  [97.8 °F (36.6 °C)-98.4 °F (36.9 °C)] 98.1 °F (36.7 °C)  Pulse:  [] 59  Resp:  [18] 18  SpO2:  [92 %-95 %] 92 %  BP: (120-160)/(59-85) 146/73     Weight: (!) 136.8 kg (301 lb 9.6 oz)  Body mass index is 47.24 kg/m².    Intake/Output Summary (Last 24 hours) at 3/17/2024 1212  Last data filed at 3/17/2024 0424  Gross per 24 hour   Intake 240 ml   Output 2200 ml   Net -1960 ml         Physical Exam  Vitals and nursing note reviewed.   HENT:      Head: Normocephalic and atraumatic.   Eyes:      Conjunctiva/sclera: Conjunctivae normal.   Neck:      Vascular: No JVD.   Cardiovascular:      Rate and Rhythm: Normal rate.      Heart sounds: Normal heart sounds.   Pulmonary:      Effort: Pulmonary effort is normal.      Breath sounds: Normal breath sounds.   Abdominal:      Palpations: Abdomen is soft.   Skin:     General: Skin is warm.   Neurological:      Mental Status: She is alert and oriented to person, place, and time.   Psychiatric:         Mood and Affect: Mood normal.             Significant Labs: All pertinent labs within the past 24 hours have been reviewed.  BMP:   Recent Labs   Lab 03/17/24  0701   *     "  K 4.1      CO2 28   *   CREATININE 3.9*   CALCIUM 9.1     CBC: No results for input(s): "WBC", "HGB", "HCT", "PLT" in the last 48 hours.  CMP:   Recent Labs   Lab 03/16/24  0438 03/17/24  0701    140   K 4.6 4.1    105   CO2 26 28   * 124*   BUN 87* 100*   CREATININE 3.6* 3.9*   CALCIUM 9.9 9.1   ANIONGAP 8 7*       Significant Imaging: I have reviewed all pertinent imaging results/findings within the past 24 hours.    Assessment/Plan:      * Congestive heart failure  She has combined systolic and diastolic acute on chronic heart failure   BNP elevated  Continue torsemide and  Metolazone 5 mg daily   cont. Her clonidine patch   Strict intake output and daily weight  Appear stable and possible DC  tomorrow       CKD (chronic kidney disease), stage IV  Single  kidney  with stage IV CKD now   Dc [facility]   Nephrology follow up    IV lasix changed to torsemide   Repeat renal function tomorrow and possible DC tomorrow     Paroxysmal atrial fibrillation  Resume coreg bid and  Eliquis  Thyroid Bx not ready yet    Thyroid nodule  Had FNA 2 days back   Eliquis was on hold and resumed today        Type 2 diabetes mellitus with microalbuminuria, without long-term current use of insulin  For now , no meds.  No insulin .   SSI      Essential hypertension  Continue home medications  Blood pressure has been stabilized      VTE Risk Mitigation (From admission, onward)           Ordered     apixaban tablet 5 mg  2 times daily         03/15/24 0244     Reason for no Mechanical VTE Prophylaxis  Once        Question:  Reasons:  Answer:  Risk of Bleeding    03/15/24 0221     IP VTE HIGH RISK PATIENT  Once         03/15/24 0221     Place sequential compression device  Until discontinued         03/15/24 0221                    Discharge Planning   HARINI:      Code Status: Full Code   Is the patient medically ready for discharge?:     Reason for patient still in hospital (select all that apply): " Treatment  Discharge Plan A: Home with family                  Manuel Christensen MD  Department of Hospital Medicine   [facility]

## 2024-03-18 VITALS
RESPIRATION RATE: 18 BRPM | SYSTOLIC BLOOD PRESSURE: 156 MMHG | BODY MASS INDEX: 45.99 KG/M2 | WEIGHT: 293 LBS | HEIGHT: 67 IN | DIASTOLIC BLOOD PRESSURE: 80 MMHG | HEART RATE: 62 BPM | OXYGEN SATURATION: 96 % | TEMPERATURE: 98 F

## 2024-03-18 LAB
ALBUMIN SERPL BCP-MCNC: 3.7 G/DL (ref 3.5–5.2)
ANION GAP SERPL CALC-SCNC: 7 MMOL/L (ref 8–16)
BUN SERPL-MCNC: 104 MG/DL (ref 8–23)
CALCIUM SERPL-MCNC: 9.9 MG/DL (ref 8.7–10.5)
CHLORIDE SERPL-SCNC: 103 MMOL/L (ref 95–110)
CO2 SERPL-SCNC: 30 MMOL/L (ref 23–29)
CREAT SERPL-MCNC: 3.8 MG/DL (ref 0.5–1.4)
EST. GFR  (NO RACE VARIABLE): 12.1 ML/MIN/1.73 M^2
GLUCOSE SERPL-MCNC: 169 MG/DL (ref 70–110)
PHOSPHATE SERPL-MCNC: 4.4 MG/DL (ref 2.7–4.5)
POTASSIUM SERPL-SCNC: 4.3 MMOL/L (ref 3.5–5.1)
SODIUM SERPL-SCNC: 140 MMOL/L (ref 136–145)

## 2024-03-18 PROCEDURE — 36415 COLL VENOUS BLD VENIPUNCTURE: CPT | Performed by: INTERNAL MEDICINE

## 2024-03-18 PROCEDURE — 80069 RENAL FUNCTION PANEL: CPT | Performed by: INTERNAL MEDICINE

## 2024-03-18 PROCEDURE — 94618 PULMONARY STRESS TESTING: CPT

## 2024-03-18 PROCEDURE — 25000003 PHARM REV CODE 250: Performed by: INTERNAL MEDICINE

## 2024-03-18 PROCEDURE — 99900035 HC TECH TIME PER 15 MIN (STAT)

## 2024-03-18 RX ORDER — TORSEMIDE 20 MG/1
20 TABLET ORAL DAILY
Qty: 30 TABLET | Refills: 11 | Status: SHIPPED | OUTPATIENT
Start: 2024-03-18 | End: 2024-06-18 | Stop reason: ALTCHOICE

## 2024-03-18 RX ADMIN — METOLAZONE 5 MG: 2.5 TABLET ORAL at 08:03

## 2024-03-18 RX ADMIN — CETIRIZINE HYDROCHLORIDE 10 MG: 10 TABLET, FILM COATED ORAL at 08:03

## 2024-03-18 RX ADMIN — TORSEMIDE 20 MG: 20 TABLET ORAL at 08:03

## 2024-03-18 RX ADMIN — AMLODIPINE BESYLATE 10 MG: 5 TABLET ORAL at 08:03

## 2024-03-18 RX ADMIN — CALCITRIOL CAPSULES 0.25 MCG 0.5 MCG: 0.25 CAPSULE ORAL at 08:03

## 2024-03-18 RX ADMIN — CARVEDILOL 25 MG: 25 TABLET, FILM COATED ORAL at 08:03

## 2024-03-18 RX ADMIN — TORSEMIDE 20 MG: 20 TABLET ORAL at 04:03

## 2024-03-18 RX ADMIN — APIXABAN 5 MG: 5 TABLET, FILM COATED ORAL at 08:03

## 2024-03-18 NOTE — PLAN OF CARE
Problem: Adult Inpatient Plan of Care  Goal: Plan of Care Review  Outcome: Met  Goal: Patient-Specific Goal (Individualized)  Outcome: Met  Goal: Absence of Hospital-Acquired Illness or Injury  Outcome: Met  Goal: Optimal Comfort and Wellbeing  Outcome: Met  Goal: Readiness for Transition of Care  Outcome: Met     Problem: Bariatric Environmental Safety  Goal: Safety Maintained with Care  Outcome: Met     Problem: Diabetes Comorbidity  Goal: Blood Glucose Level Within Targeted Range  Outcome: Met     Problem: Fall Injury Risk  Goal: Absence of Fall and Fall-Related Injury  Outcome: Met     Problem: Impaired Wound Healing  Goal: Optimal Wound Healing  Outcome: Met

## 2024-03-18 NOTE — PLAN OF CARE
Message to Simran with discharge clinic to see if they have available appointment       03/18/24 0864   Post-Acute Status   Hospital Resources/Appts/Education Provided Appointment suggestion unavailable

## 2024-03-18 NOTE — PLAN OF CARE
Scheduled with discharge clinic 3/27/24 0900       03/18/24 0998   Post-Acute Status   Hospital Resources/Appts/Education Provided Appointments scheduled and added to AVS

## 2024-03-18 NOTE — PROGRESS NOTES
INPATIENT NEPHROLOGY Progress Note  [facility]    Patient Name: Juhi Taylor  Date: 03/18/2024    Reason for consultation:    Chief Complaint:   Chief Complaint   Patient presents with    Shortness of Breath    Leg Swelling       History of Present Illness:  73 BF with pmh of HTN , CKD IV , renal CA with nephrectomy , DMII not on insulin , afib on eliquis , cardiomyopathy with 40% EF and CAD with stents - f/b Dr. Martinez- admitted with SAMPSON, orthopnea, anasarca, weight gain and decreased urination despite oral diuretic regimen- admit BP > 200/90, CXR with acute pulm edema- consulted for renal management.    Interval History:  3/16- hypertensive at admission, on RA, BNP 1800, trop leak, very edematous  3/17- BP improved, on RA, UOP 2.2L  3/18  AFVSS.  Output not recorded.  No labs today    Plan of Care:    Assessment:  PEACE/CKD V due to CRS  HTN emergency/Decompensated S CHF with acute pulm edema/Anasarca  SHPT  Anemia of CKD    Plan:    - SCr bumped- switched IV lasix to PO torsemide and continue metolazone 5mg daily. No labs today.  Will order renl panel to ensure stability.    - with such a reduced eGFR, wouldn't risk PEACE/hyperkalemia with aldactone- hold therapy  - ok with clonidine patch, norasc, coreg- BP is coming down  - renal diet, 1.5L fluid restriction  - continue calcitriol  - no acute PONCE needs  I discussed the eventual likelihood that she will need RRT.  She would like to pursue peritoneal hd when the time comes.  I ordered stat renal panel for today    Thank you for allowing us to participate in this patient's care. We will continue to follow.    Vital Signs:  Temp Readings from Last 3 Encounters:   03/18/24 98.7 °F (37.1 °C) (Oral)   01/31/24 98.1 °F (36.7 °C) (Oral)   08/22/23 98.5 °F (36.9 °C) (Oral)       Pulse Readings from Last 3 Encounters:   03/18/24 88   01/31/24 66   11/15/23 (!) 58       BP Readings from Last 3 Encounters:   03/18/24 (!) 176/92   01/31/24 (!)  140/90   11/15/23 122/80       Weight:  Wt Readings from Last 3 Encounters:   03/17/24 (!) 137.1 kg (302 lb 3.2 oz)   01/31/24 131.9 kg (290 lb 12.6 oz)   11/15/23 130.7 kg (288 lb 4 oz)       INS/OUTS:  I/O last 3 completed shifts:  In: 600 [P.O.:600]  Out: 1500 [Urine:1500]  No intake/output data recorded.    Medications:  Scheduled Meds:   amLODIPine  10 mg Oral Daily    apixaban  5 mg Oral BID    calcitRIOL  0.5 mcg Oral Daily    carvediloL  25 mg Oral BID    cetirizine  10 mg Oral Daily    cloNIDine 0.1 mg/24 hr td ptwk  1 patch Transdermal Q7 Days    metOLazone  5 mg Oral Daily    torsemide  20 mg Oral BID loop     Continuous Infusions:  PRN Meds:.acetaminophen, bisacodyL, hydrALAZINE, magnesium oxide, ondansetron, sodium chloride 0.9%  No current facility-administered medications on file prior to encounter.     Current Outpatient Medications on File Prior to Encounter   Medication Sig Dispense Refill    allopurinoL (ZYLOPRIM) 300 MG tablet Take 1 tablet (300 mg total) by mouth once daily. 90 tablet 3    amLODIPine (NORVASC) 10 MG tablet Take 10 mg by mouth once daily.      calcitRIOL (ROCALTROL) 0.5 MCG Cap Take 0.5 mcg by mouth once daily.      carvediloL (COREG) 25 MG tablet TAKE ONE TABLET BY MOUTH TWICE DAILY (Patient taking differently: Take 25 mg by mouth 2 (two) times daily.) 180 tablet 2    cloNIDine (CATAPRES) 0.1 MG tablet Take 1 tablet (0.1 mg total) by mouth 3 (three) times daily as needed (PRN SBP > 165 mmHg). 90 tablet 6    cyanocobalamin (VITAMIN B-12) 100 MCG tablet Take 100 mcg by mouth once daily.      evolocumab (REPATHA SURECLICK) 140 mg/mL PnIj Inject 1 mL (140 mg total) into the skin every 14 (fourteen) days. 2 mL 11    furosemide (LASIX) 40 MG tablet Take 40 mg by mouth once daily.      loratadine (CLARITIN) 10 mg tablet Take 10 mg by mouth once daily.      magnesium oxide (MAG-OX) 400 mg (241.3 mg magnesium) tablet Take 1 tablet (400 mg total) by mouth daily as needed (cramping).  (Patient taking differently: Take 400 mg by mouth once daily.) 30 tablet 0    metOLazone (ZAROXOLYN) 2.5 MG tablet Take 2.5 mg by mouth nightly.      nitroGLYCERIN (NITROSTAT) 0.4 MG SL tablet Place 1 tablet (0.4 mg total) under the tongue every 5 (five) minutes as needed for Chest pain. 25 tablet 0    spironolactone (ALDACTONE) 25 MG tablet Take 0.5 tablets (12.5 mg total) by mouth once daily. 30 tablet 11    apixaban (ELIQUIS) 5 mg Tab Take 1 tablet (5 mg total) by mouth 2 (two) times daily. (Patient not taking: Reported on 3/14/2024) 180 tablet 0    blood sugar diagnostic (TRUE METRIX GLUCOSE TEST STRIP) Strp Use 1x daily. Insurance preferred. 100 strip 3    blood sugar diagnostic Strp To check BG 1 time daily, to use with insurance preferred meter 100 strip 3    cloNIDine 0.1 mg/24 hr td ptwk (CATAPRES) 0.1 mg/24 hr Place 1 patch onto the skin every 7 days. (Patient taking differently: Place 1 patch onto the skin every 7 days. TUESDAYS) 4 patch 4    glipiZIDE 5 MG TR24 Take 2 tablets (10 mg total) by mouth daily with breakfast. (Patient not taking: Reported on 3/14/2024) 180 tablet 3    lancets Misc To check BG 1 times daily, to use with insurance preferred meter 100 each 3    lancets Misc To check BG 1 times daily, to use with insurance preferred meter 100 each 3    latanoprost 0.005 % ophthalmic solution Place 1 drop into both eyes every evening. (Patient not taking: Reported on 3/14/2024) 7.5 mL 3    [DISCONTINUED] aspirin (ECOTRIN) 81 MG EC tablet Take 1 tablet (81 mg total) by mouth once daily. 90 tablet 3    [DISCONTINUED] DULoxetine (CYMBALTA) 20 MG capsule TAKE ONE CAPSULE BY MOUTH ONCE DAILY 30 capsule 4    [DISCONTINUED] folic acid (FOLVITE) 1 MG tablet Take 1 mg by mouth once daily.      [DISCONTINUED] metFORMIN (GLUCOPHAGE-XR) 500 MG ER 24hr tablet Take 2 tablets (1,000 mg total) by mouth 2 (two) times daily with meals. 360 tablet 3    [DISCONTINUED] sodium bicarbonate 650 MG tablet Take 1 tablet  "(650 mg total) by mouth once daily. 30 tablet 11       Review of Systems:  neg    Physical Exam:  BP (!) 176/92   Pulse 88   Temp 98.7 °F (37.1 °C) (Oral)   Resp 18   Ht 5' 7" (1.702 m)   Wt (!) 137.1 kg (302 lb 3.2 oz)   SpO2 (!) 93%   BMI 47.33 kg/m²     General Appearance:    NAD, AAO x 3, cooperative, appears stated age   Head:    Normocephalic, atraumatic   Eyes:    PER, EOMI, and conjunctiva/sclera clear bilaterally        Mouth:   Moist mucus membranes, no thrush or oral lesions, normal      dentition   Back:     No CVA tenderness   Lungs:     Clear to auscultation bilaterally, no wheeze, crackles, rales      or rhonchi, symmetric air movement, respirations unlabored   Heart:    Regular rate and rhythm, S1 and S2 normal, no murmur, rub   or gallop   Abdomen:     Soft, non-tender, non-distended, bowel sounds active all four   quadrants, no RT or guarding, no masses, no organomegaly   Extremities:   Warm and well perfused, anasarca   MSK:   No joint or muscle swelling, tenderness or deformity   Skin:   Skin color, texture, turgor normal, no rashes or lesions   Neurologic/Psychiatric:   CNII-XII intact, normal strength and sensation       throughout, no asterixis; normal affect, memory, judgement     and insight     Results:  Lab Results   Component Value Date     03/17/2024    K 4.1 03/17/2024     03/17/2024    CO2 28 03/17/2024     (H) 03/17/2024    CREATININE 3.9 (H) 03/17/2024    CALCIUM 9.1 03/17/2024    ANIONGAP 7 (L) 03/17/2024    ESTGFRAFRICA 16.2 (A) 07/15/2022    EGFRNONAA 14.0 (A) 07/15/2022       Lab Results   Component Value Date    CALCIUM 9.1 03/17/2024    PHOS 3.9 01/22/2024       No results for input(s): "WBC", "RBC", "HGB", "HCT", "PLT", "MCV", "MCH", "MCHC" in the last 24 hours.    I have personally reviewed pertinent radiological imaging and reports.    I have spent > 35 minutes providing care for this patient for the above diagnoses. These services have included " chart/data/imaging review, evaluation, exam, formulation of plan, , note preparation, and discussions with staff involved in this patient's care.    Inder Khalil MD  Nephrology  [facility]  [phone]

## 2024-03-18 NOTE — SUBJECTIVE & OBJECTIVE
Interval History:   Patient's Cr Pending, awaiting Nephrology Recs, and awaiting Patient walking with PT/OT    Review of Systems   Constitutional:  Negative for activity change, appetite change, diaphoresis and fatigue.   HENT:  Negative for congestion, rhinorrhea, sinus pressure and sinus pain.    Eyes:  Negative for photophobia, redness and visual disturbance.   Respiratory:  Negative for apnea, chest tightness and shortness of breath.    Cardiovascular:  Negative for chest pain, palpitations and leg swelling.   Gastrointestinal:  Negative for abdominal distention, blood in stool, constipation, diarrhea and vomiting.   Endocrine: Negative for polydipsia, polyphagia and polyuria.   Genitourinary:  Negative for dysuria, enuresis, flank pain, hematuria and urgency.   Musculoskeletal:  Negative for arthralgias, back pain and myalgias.   Skin:  Negative for pallor, rash and wound.   Neurological:  Negative for dizziness, tremors, weakness and headaches.   Psychiatric/Behavioral:  Negative for agitation, confusion and decreased concentration. The patient is not nervous/anxious.      Objective:     Vital Signs (Most Recent):  Temp: 97.9 °F (36.6 °C) (03/18/24 1139)  Pulse: 104 (03/18/24 1139)  Resp: 18 (03/18/24 1139)  BP: (!) 165/105 (03/18/24 1139)  SpO2: (!) 93 % (03/18/24 1139) Vital Signs (24h Range):  Temp:  [97.4 °F (36.3 °C)-98.7 °F (37.1 °C)] 97.9 °F (36.6 °C)  Pulse:  [] 104  Resp:  [18] 18  SpO2:  [91 %-96 %] 93 %  BP: (113-191)/() 165/105     Weight: (!) 137.1 kg (302 lb 3.2 oz)  Body mass index is 47.33 kg/m².    Intake/Output Summary (Last 24 hours) at 3/18/2024 1259  Last data filed at 3/18/2024 1141  Gross per 24 hour   Intake 240 ml   Output 500 ml   Net -260 ml         Physical Exam  Constitutional:       General: She is not in acute distress.     Appearance: She is obese. She is not ill-appearing or toxic-appearing.   HENT:      Head: Normocephalic and atraumatic.      Right Ear: External  "ear normal.      Left Ear: External ear normal.      Nose: No congestion or rhinorrhea.   Cardiovascular:      Heart sounds: No murmur heard.  Pulmonary:      Effort: No respiratory distress.      Breath sounds: No stridor. No wheezing, rhonchi or rales.   Chest:      Chest wall: No tenderness.   Abdominal:      General: There is no distension.      Palpations: There is no mass.      Tenderness: There is no abdominal tenderness. There is no guarding or rebound.      Hernia: No hernia is present.   Musculoskeletal:         General: No swelling, tenderness, deformity or signs of injury.      Right lower leg: Edema present.      Left lower leg: Edema present.   Skin:     Coloration: Skin is not jaundiced or pale.      Findings: No bruising, erythema, lesion or rash.   Neurological:      Mental Status: She is alert and oriented to person, place, and time. Mental status is at baseline.      Motor: No weakness.             Significant Labs: All pertinent labs within the past 24 hours have been reviewed.  CBC: No results for input(s): "WBC", "HGB", "HCT", "PLT" in the last 48 hours.  CMP:   Recent Labs   Lab 03/17/24  0701 03/18/24  1210    140   K 4.1 4.3    103   CO2 28 30*   * 169*   * 104*   CREATININE 3.9* 3.8*   CALCIUM 9.1 9.9   ALBUMIN  --  3.7   ANIONGAP 7* 7*       Significant Imaging: I have reviewed all pertinent imaging results/findings within the past 24 hours.  "

## 2024-03-18 NOTE — ASSESSMENT & PLAN NOTE
Single  kidney  with stage IV CKD now   [facility]   Nephrology following, Patient had increase in Cr. Will appreciate new Recs  IV lasix changed to torsemide

## 2024-03-18 NOTE — CARE UPDATE
03/18/24 0957   Home Oxygen Qualification   $ Home O2 Qualification Tech time 15 minutes;Pulmonary Stress Test/6 min walk   Room Air SpO2 At Rest 96 %   Room Air SpO2 During Ambulation 93 %   SpO2 During Ambulation on O2   (na)   Heart Rate on O2   (na)   Ambulation O2 LPM   (na)   SpO2 Post Ambulation 92 %   Post Ambulation Heart Rate 89 bpm   Post Ambulation O2 LPM   (na)

## 2024-03-18 NOTE — PROGRESS NOTES
Critical access hospital Medicine  Progress Note    Patient Name: Juhi Taylor  MRN: [id number]  Patient Class: IP- Inpatient   Admission Date: 3/14/2024  Length of Stay: 3 days  Attending Physician: Sunny Braun MD  Primary Care Provider: Tony Sadler MD        Subjective:     Principal Problem:Congestive heart failure        HPI:  73 BF with pmh of HTN ,  CKD IV , renal CA with nephrectomy , DMII not on insulin , afib on eliquis , cardiomyopathy with 40% EF and CAD with stents       Non smoker  Non drinker    Lives at home with daughter      She comes in because of just not getting better she says.  She has felt bad now for several weeks if not longer.    She is so SOB she can't do much at all except lay around all day she says.     She is not noticing any more SOB laying down in bed.     She admits to more swelling  She thinks she is 20+ pounds over weight now from fluid   She has had CHF before and she takes lasix and aldactone at home alternating days       So she decided to come to our ER     In the ER her Creatinine was 3-4 range is normal now for her.    She had normal Cr back in 2022 but has gone up since    She has a nephrologist she follows with       She says she has not been urinating as much lately but she is taking her diuretics like she is supposed to be . Not missing doses.    Says her BP is controlled normally at home       In the ER her CXR shows CHF findings congestion     They gave her 40 mg IV lasix     She did make urine and said she felt better when I saw her       We are admitting her for CHF     Overview/Hospital Course:  73 BF with pmh of HTN , CKD IV , renal CA with nephrectomy , DMII not on insulin , afib on eliquis , cardiomyopathy with 40% EF and CAD with stents - f/b Dr. Martinez- admitted with SAMPSON, orthopnea, anasarca, weight gain and decreased urination despite oral diuretic regimen along with hypertensive crisis.   admit BP > 200/90 and CXR with  acute pulm edema and IV Lasix high-dose were given.  Later Aldactone was discontinued and increase the dose of her home medication metolazone to 5 mg daily.  Her kidney function was initially stable but later getting worse and IV was discontinued and changed to torsemide by Nephrology.  Her BUN is severely elevated and Nephrology wants to keep today and discharge tomorrow.  She had good diuresis nearly 5-6 L during the past 2 days and feeling better. Off note patient had FNA of the thyroid nodule and  and cytology report is not reported yet.     Patient had an increase in Cr, Nephrology following. Awaiting RT to assess Patient's ambulation    Interval History:   Patient's Cr Pending, awaiting Nephrology Recs, and awaiting Patient walking with PT/OT    Review of Systems   Constitutional:  Negative for activity change, appetite change, diaphoresis and fatigue.   HENT:  Negative for congestion, rhinorrhea, sinus pressure and sinus pain.    Eyes:  Negative for photophobia, redness and visual disturbance.   Respiratory:  Negative for apnea, chest tightness and shortness of breath.    Cardiovascular:  Negative for chest pain, palpitations and leg swelling.   Gastrointestinal:  Negative for abdominal distention, blood in stool, constipation, diarrhea and vomiting.   Endocrine: Negative for polydipsia, polyphagia and polyuria.   Genitourinary:  Negative for dysuria, enuresis, flank pain, hematuria and urgency.   Musculoskeletal:  Negative for arthralgias, back pain and myalgias.   Skin:  Negative for pallor, rash and wound.   Neurological:  Negative for dizziness, tremors, weakness and headaches.   Psychiatric/Behavioral:  Negative for agitation, confusion and decreased concentration. The patient is not nervous/anxious.      Objective:     Vital Signs (Most Recent):  Temp: 97.9 °F (36.6 °C) (03/18/24 1139)  Pulse: 104 (03/18/24 1139)  Resp: 18 (03/18/24 1139)  BP: (!) 165/105 (03/18/24 1139)  SpO2: (!) 93 % (03/18/24 1139)  "Vital Signs (24h Range):  Temp:  [97.4 °F (36.3 °C)-98.7 °F (37.1 °C)] 97.9 °F (36.6 °C)  Pulse:  [] 104  Resp:  [18] 18  SpO2:  [91 %-96 %] 93 %  BP: (113-191)/() 165/105     Weight: (!) 137.1 kg (302 lb 3.2 oz)  Body mass index is 47.33 kg/m².    Intake/Output Summary (Last 24 hours) at 3/18/2024 1259  Last data filed at 3/18/2024 1141  Gross per 24 hour   Intake 240 ml   Output 500 ml   Net -260 ml         Physical Exam  Constitutional:       General: She is not in acute distress.     Appearance: She is obese. She is not ill-appearing or toxic-appearing.   HENT:      Head: Normocephalic and atraumatic.      Right Ear: External ear normal.      Left Ear: External ear normal.      Nose: No congestion or rhinorrhea.   Cardiovascular:      Heart sounds: No murmur heard.  Pulmonary:      Effort: No respiratory distress.      Breath sounds: No stridor. No wheezing, rhonchi or rales.   Chest:      Chest wall: No tenderness.   Abdominal:      General: There is no distension.      Palpations: There is no mass.      Tenderness: There is no abdominal tenderness. There is no guarding or rebound.      Hernia: No hernia is present.   Musculoskeletal:         General: No swelling, tenderness, deformity or signs of injury.      Right lower leg: Edema present.      Left lower leg: Edema present.   Skin:     Coloration: Skin is not jaundiced or pale.      Findings: No bruising, erythema, lesion or rash.   Neurological:      Mental Status: She is alert and oriented to person, place, and time. Mental status is at baseline.      Motor: No weakness.             Significant Labs: All pertinent labs within the past 24 hours have been reviewed.  CBC: No results for input(s): "WBC", "HGB", "HCT", "PLT" in the last 48 hours.  CMP:   Recent Labs   Lab 03/17/24  0701 03/18/24  1210    140   K 4.1 4.3    103   CO2 28 30*   * 169*   * 104*   CREATININE 3.9* 3.8*   CALCIUM 9.1 9.9   ALBUMIN  --  3.7 "   ANIONGAP 7* 7*       Significant Imaging: I have reviewed all pertinent imaging results/findings within the past 24 hours.    Assessment/Plan:      * Congestive heart failure  She has combined systolic and diastolic acute on chronic heart failure   BNP elevated  Continue torsemide and  Metolazone 5 mg daily   cont. Her clonidine patch   Strict intake output and daily weight  Appear stable and possible DC  tomorrow       CKD (chronic kidney disease), stage IV  Single  kidney  with stage IV CKD now   [facility]   Nephrology following, Patient had increase in Cr. Will appreciate new Recs  IV lasix changed to torsemide       Paroxysmal atrial fibrillation  Resume coreg bid and  Eliquis  Thyroid Bx not ready yet    Thyroid nodule  Had FNA 2 days back   Eliquis was on hold and resumed today        Type 2 diabetes mellitus with microalbuminuria, without long-term current use of insulin  For now , no meds.  No insulin .   SSI      Essential hypertension  Continue home medications  Blood pressure has been stabilized      VTE Risk Mitigation (From admission, onward)           Ordered     apixaban tablet 5 mg  2 times daily         03/15/24 0244     Reason for no Mechanical VTE Prophylaxis  Once        Question:  Reasons:  Answer:  Risk of Bleeding    03/15/24 0221     IP VTE HIGH RISK PATIENT  Once         03/15/24 0221     Place sequential compression device  Until discontinued         03/15/24 0221                    Discharge Planning   HARINI: 3/19/2024     Code Status: Full Code   Is the patient medically ready for discharge?:     Reason for patient still in hospital (select all that apply): Treatment  Discharge Plan A: Home with family                  Sunny Braun MD  Department of Hospital Medicine   [facility]

## 2024-03-18 NOTE — NURSING
Upon discharge pt received information regarding hospital stay, when to contact hcp, and future follow-up appointments. Pt verbalized an understanding of all the information discussed, questions/concerns were addressed accordingly. Pt's telemetry and IV line was discontinued per order, catheter tip intact no signs of bleeding/distress noted. Dressing applied to site. Pt waiting for family transportation. NADN, will continue to monitor.

## 2024-03-19 NOTE — DISCHARGE SUMMARY
Critical access hospital Medicine  Discharge Summary      Patient Name: Juhi Taylor  MRN: [id number]  ALENA: [id number]  Patient Class: IP- Inpatient  Admission Date: 3/14/2024  Hospital Length of Stay: 3 days  Discharge Date and Time:  03/18/2024 7:14 PM  Attending Physician: Sunny Braun MD   Discharging Provider: Sunny Braun MD  Primary Care Provider: Tony Sadler MD    Primary Care Team: Networked reference to record PCT     HPI:   73 BF with pmh of HTN ,  CKD IV , renal CA with nephrectomy , DMII not on insulin , afib on eliquis , cardiomyopathy with 40% EF and CAD with stents       Non smoker  Non drinker    Lives at home with daughter      She comes in because of just not getting better she says.  She has felt bad now for several weeks if not longer.    She is so SOB she can't do much at all except lay around all day she says.     She is not noticing any more SOB laying down in bed.     She admits to more swelling  She thinks she is 20+ pounds over weight now from fluid   She has had CHF before and she takes lasix and aldactone at home alternating days       So she decided to come to our ER     In the ER her Creatinine was 3-4 range is normal now for her.    She had normal Cr back in 2022 but has gone up since    She has a nephrologist she follows with       She says she has not been urinating as much lately but she is taking her diuretics like she is supposed to be . Not missing doses.    Says her BP is controlled normally at home       In the ER her CXR shows CHF findings congestion     They gave her 40 mg IV lasix     She did make urine and said she felt better when I saw her       We are admitting her for CHF     * No surgery found *      Hospital Course:   73 BF with pmh of HTN , CKD IV , renal CA with nephrectomy , DMII not on insulin , afib on eliquis , cardiomyopathy with 40% EF and CAD with stents - f/b Dr. Martinez- admitted with SAMPSON, orthopnea, anasarca,  weight gain and decreased urination despite oral diuretic regimen along with hypertensive crisis.   admit BP > 200/90 and CXR with acute pulm edema and IV Lasix high-dose were given.  Later Aldactone was discontinued and increase the dose of her home medication metolazone to 5 mg daily.  Her kidney function was initially stable but later getting worse and IV was discontinued and changed to torsemide by Nephrology.  Her BUN is severely elevated and Nephrology wants to keep today and discharge tomorrow.  She had good diuresis nearly 5-6 L during the past 2 days and feeling better. Off note patient had FNA of the thyroid nodule and  and cytology report is not reported yet.     Patient had an increase in Cr, however still at baseline. Nephrology recommended that Patient be changed from IV Lasix to PO Torsemide and Dc'd Aldactone      Goals of Care Treatment Preferences:  Code Status: Full Code      Consults:   Consults (From admission, onward)          Status Ordering Provider     Inpatient consult to Registered Dietitian/Nutritionist  Once        Provider:  (Not yet assigned)    Acknowledged FABIÁN MERINO     Inpatient consult to Nephrology  Once        Provider:  Hector Blakely MD    Completed SANDY COBB     Inpatient consult to Registered Dietitian/Nutritionist  Once        Provider:  (Not yet assigned)    Completed SURAJ YOUNGBLOOD            Cardiac/Vascular  * Congestive heart failure  She has combined systolic and diastolic acute on chronic heart failure   BNP elevated  Continue torsemide and  Metolazone 5 mg daily   cont. Her clonidine patch   Strict intake output and daily weight  Appear stable and possible DC  tomorrow       Paroxysmal atrial fibrillation  Resume coreg bid and  Eliquis  Thyroid Bx not ready yet    Renal/  CKD (chronic kidney disease), stage IV  Single  kidney  with stage IV CKD now   Dc [facility]   Nephrology followed inpatient, Patient to f/u outpatient   IV lasix changed to torsemide  "      Endocrine  Type 2 diabetes mellitus with microalbuminuria, without long-term current use of insulin  For now , no meds.  No insulin .   SSI while inpatient         Final Active Diagnoses:    Diagnosis Date Noted POA    PRINCIPAL PROBLEM:  Congestive heart failure [I50.9] 11/30/2020 Yes     Chronic    CKD (chronic kidney disease), stage IV [N18.4] 03/15/2024 Yes    Paroxysmal atrial fibrillation [I48.0] 12/10/2021 Yes     Chronic    Thyroid nodule [E04.1] 12/02/2018 Yes    Type 2 diabetes mellitus with microalbuminuria, without long-term current use of insulin [E11.29, R80.9] 08/31/2017 Yes     Chronic    Essential hypertension [I10] 08/31/2017 Yes      Problems Resolved During this Admission:       Discharged Condition: good    Disposition: Home or Self Care    Follow Up:   Follow-up Information       [facility]. Go on 3/27/2024.    Specialty: Primary Care  Why: Hospital follow up scheduled for 9:00 a.m. on 3/27.  Contact information:  [address], [address] [address] [phone]  Additional information:  [facility]                         Patient Instructions:   No discharge procedures on file.    Significant Diagnostic Studies: Labs: CMP   Recent Labs   Lab 03/17/24  0701 03/18/24  1210    140   K 4.1 4.3    103   CO2 28 30*   * 169*   * 104*   CREATININE 3.9* 3.8*   CALCIUM 9.1 9.9   ALBUMIN  --  3.7   ANIONGAP 7* 7*    and CBC No results for input(s): "WBC", "HGB", "HCT", "PLT" in the last 48 hours.    Pending Diagnostic Studies:       None           Medications:  Reconciled Home Medications:      Medication List        START taking these medications      apixaban 5 mg Tab  Commonly known as: ELIQUIS  Take 1 tablet (5 mg total) by mouth 2 (two) times daily.     torsemide 20 MG Tab  Commonly known as: DEMADEX  Take 1 tablet (20 mg total) by mouth once daily.            CHANGE how you take these medications      cloNIDine 0.1 mg/24 hr td ptwk " 0.1 mg/24 hr  Commonly known as: CATAPRES  Place 1 patch onto the skin every 7 days.  What changed: additional instructions     magnesium oxide 400 mg (241.3 mg magnesium) tablet  Commonly known as: MAG-OX  Take 1 tablet (400 mg total) by mouth daily as needed (cramping).  What changed: when to take this            CONTINUE taking these medications      allopurinoL 300 MG tablet  Commonly known as: ZYLOPRIM  Take 1 tablet (300 mg total) by mouth once daily.     amLODIPine 10 MG tablet  Commonly known as: NORVASC  Take 10 mg by mouth once daily.     * blood sugar diagnostic Strp  To check BG 1 time daily, to use with insurance preferred meter     * blood sugar diagnostic Strp  Commonly known as: TRUE METRIX GLUCOSE TEST STRIP  Use 1x daily. Insurance preferred.     calcitRIOL 0.5 MCG Cap  Commonly known as: ROCALTROL  Take 0.5 mcg by mouth once daily.     carvediloL 25 MG tablet  Commonly known as: COREG  TAKE ONE TABLET BY MOUTH TWICE DAILY     cloNIDine 0.1 MG tablet  Commonly known as: CATAPRES  Take 1 tablet (0.1 mg total) by mouth 3 (three) times daily as needed (PRN SBP > 165 mmHg).     * lancets Misc  To check BG 1 times daily, to use with insurance preferred meter     * lancets Misc  To check BG 1 times daily, to use with insurance preferred meter     loratadine 10 mg tablet  Commonly known as: CLARITIN  Take 10 mg by mouth once daily.     metOLazone 2.5 MG tablet  Commonly known as: ZAROXOLYN  Take 2.5 mg by mouth nightly.     nitroGLYCERIN 0.4 MG SL tablet  Commonly known as: NITROSTAT  Place 1 tablet (0.4 mg total) under the tongue every 5 (five) minutes as needed for Chest pain.     REPATHA SURECLICK 140 mg/mL Pnij  Generic drug: evolocumab  Inject 1 mL (140 mg total) into the skin every 14 (fourteen) days.     VITAMIN B-12 100 MCG tablet  Generic drug: cyanocobalamin  Take 100 mcg by mouth once daily.           * This list has 4 medication(s) that are the same as other medications prescribed for you.  Read the directions carefully, and ask your doctor or other care provider to review them with you.                STOP taking these medications      furosemide 40 MG tablet  Commonly known as: LASIX     glipiZIDE 5 MG Tr24     latanoprost 0.005 % ophthalmic solution     spironolactone 25 MG tablet  Commonly known as: ALDACTONE              Indwelling Lines/Drains at time of discharge:   Lines/Drains/Airways       Drain  Duration             Female External Urinary Catheter w/ Suction 03/14/24 2130 3 days                    Time spent on the discharge of patient: 45 minutes         Sunny Braun MD  Department of Hospital Medicine  [facility]

## 2024-03-19 NOTE — ASSESSMENT & PLAN NOTE
Single  kidney  with stage IV CKD now   [facility]   Nephrology followed inpatient, Patient to f/u outpatient   IV lasix changed to torsemide

## 2024-03-22 DIAGNOSIS — I48.0 PAROXYSMAL ATRIAL FIBRILLATION: ICD-10-CM

## 2024-03-25 DIAGNOSIS — I48.0 PAROXYSMAL ATRIAL FIBRILLATION: ICD-10-CM

## 2024-03-25 RX ORDER — APIXABAN 5 MG/1
5 TABLET, FILM COATED ORAL 2 TIMES DAILY
Qty: 60 TABLET | Refills: 6 | OUTPATIENT
Start: 2024-03-25

## 2024-03-25 NOTE — TELEPHONE ENCOUNTER
----- Message from Kinjal Castro sent at 3/25/2024 11:52 AM CDT -----  Contact: self [phone]  Pt said can she get a Rx called into [facility] on [address]  for her Eliquis she said the pharmacy she uses did not have the rx  ans her last rx was transferred there.

## 2024-03-25 NOTE — TELEPHONE ENCOUNTER
Please approve for ELIQUIS 5 mg Tab     Last OV 11/15/23  Last refill date 11/15/23  180x0r  Next appt -

## 2024-03-25 NOTE — TELEPHONE ENCOUNTER
Please review. If Ok, please approve for Eliquis or advise if this should be followed by Dr Pedersen.   Changing this to [facility] as [facility] does not have in stock.      Last OV 11/15/23  Last refill date 11/15/23 #180 x 0R

## 2024-03-25 NOTE — TELEPHONE ENCOUNTER
Refill Routing Note   Medication(s) are not appropriate for processing by [facility] for the following reason(s):        Outside of protocol    [facility] action(s):  Route               Appointments  past 12m or future 3m with PCP    Date Provider   Last Visit   11/15/2023 Tony Sadler MD   Next Visit   Visit date not found Tony Sadler MD   ED visits in past 90 days: 0        Note composed:8:44 AM 03/25/2024

## 2024-03-26 ENCOUNTER — PATIENT OUTREACH (OUTPATIENT)
Dept: ADMINISTRATIVE | Facility: CLINIC | Age: 74
End: 2024-03-26
Payer: MEDICARE

## 2024-03-26 NOTE — PROGRESS NOTES
C3 nurse attempted to contact Juhi Taylor for a TCC post hospital discharge follow up call. No answer. Left voicemail with callback information. The patient has a scheduled HOSFU appointment with [facility] on 03/27/24 @ 0900.

## 2024-03-27 ENCOUNTER — OFFICE VISIT (OUTPATIENT)
Dept: PRIMARY CARE CLINIC | Facility: CLINIC | Age: 74
End: 2024-03-27
Payer: MEDICARE

## 2024-03-27 VITALS
DIASTOLIC BLOOD PRESSURE: 80 MMHG | WEIGHT: 291 LBS | SYSTOLIC BLOOD PRESSURE: 130 MMHG | HEIGHT: 67 IN | BODY MASS INDEX: 45.67 KG/M2 | TEMPERATURE: 98 F | HEART RATE: 66 BPM | OXYGEN SATURATION: 94 %

## 2024-03-27 DIAGNOSIS — I50.9 CONGESTIVE HEART FAILURE, UNSPECIFIED HF CHRONICITY, UNSPECIFIED HEART FAILURE TYPE: Primary | ICD-10-CM

## 2024-03-27 DIAGNOSIS — L29.9 PRURITUS: ICD-10-CM

## 2024-03-27 DIAGNOSIS — R25.2 MUSCLE CRAMPS: ICD-10-CM

## 2024-03-27 DIAGNOSIS — G47.33 OSA (OBSTRUCTIVE SLEEP APNEA): ICD-10-CM

## 2024-03-27 PROCEDURE — 99999 PR PBB SHADOW E&M-EST. PATIENT-LVL V: CPT | Mod: PBBFAC,,, | Performed by: NURSE PRACTITIONER

## 2024-03-27 PROCEDURE — 99204 OFFICE O/P NEW MOD 45 MIN: CPT | Mod: S$PBB,,, | Performed by: NURSE PRACTITIONER

## 2024-03-27 PROCEDURE — 99215 OFFICE O/P EST HI 40 MIN: CPT | Mod: PBBFAC,PN | Performed by: NURSE PRACTITIONER

## 2024-03-27 RX ORDER — HYDROXYZINE HYDROCHLORIDE 25 MG/1
25 TABLET, FILM COATED ORAL 3 TIMES DAILY PRN
Qty: 30 TABLET | Refills: 0 | Status: SHIPPED | OUTPATIENT
Start: 2024-03-27 | End: 2024-05-27

## 2024-03-27 NOTE — PROGRESS NOTES
3rd Attempt made to reach patient for TCC call. Left voicemail please call [phone] leave first name, last name, and  Valorie will return your call.

## 2024-03-27 NOTE — PROGRESS NOTES
"Transitional Care Note  Subjective:       Patient ID: Juhi Taylor is a 73 y.o. female.  Chief Complaint: Hospital Follow Up    Family and/or Caretaker present at visit?  No.  Diagnostic tests reviewed/disposition: I have reviewed all completed as well as pending diagnostic tests at the time of discharge.  Disease/illness education: itching, cramping  Home health/community services discussion/referrals: Patient does not have home health established from hospital visit.  They do not need home health.  If needed, we will set up home health for the patient.   Establishment or re-establishment of referral orders for community resources:  no other community resources needed .   Discussion with other health care providers: No discussion with other health care providers necessary.   Juhi Taylor presents today for hospital follow up after admission for CHF exacerbation and hypertensive crisis  Hospital Course:  "73 BF with pmh of HTN , CKD IV , renal CA with nephrectomy , DMII not on insulin , afib on eliquis , cardiomyopathy with 40% EF and CAD with stents - f/b Dr. Martinez- admitted with SAMPSON, orthopnea, anasarca, weight gain and decreased urination despite oral diuretic regimen along with hypertensive crisis.   admit BP > 200/90 and CXR with acute pulm edema and IV Lasix high-dose were given.  Later Aldactone was discontinued and increase the dose of her home medication metolazone to 5 mg daily.  Her kidney function was initially stable but later getting worse and IV was discontinued and changed to torsemide by Nephrology.  Her BUN is severely elevated and Nephrology wants to keep today and discharge tomorrow.  She had good diuresis nearly 5-6 L during the past 2 days and feeling better. Off note patient had FNA of the thyroid nodule and  and cytology report is not reported yet.     Patient had an increase in Cr, however still at baseline. Nephrology recommended that Patient be changed from IV Lasix to PO " "Torsemide and Dc'd Aldactone"    Since discharge she states that she continues to be short of breath and fatigued but it is improving every day. Lab work was discussed and questions answered. She reported hand and leg cramping since starting torsemide. She states magnesium does help some but not enough. Reviewed labs, potassium and magnesium are in normal limits. She also complains of itching. She has tried topical benadryl with minimal relief.     She reports constant post nasal drip and congestion, concerned it may be related to her CPAP mask and would like another evaluation for it.        Review of Systems   Constitutional:  Positive for fatigue (improving). Negative for chills and fever.   HENT:  Negative for congestion and sore throat.    Eyes:  Negative for visual disturbance.   Respiratory:  Positive for shortness of breath (improving). Negative for cough.    Cardiovascular:  Positive for leg swelling. Negative for chest pain and palpitations.   Gastrointestinal:  Negative for abdominal pain, nausea and vomiting.   Endocrine: Negative for cold intolerance and heat intolerance.   Genitourinary:  Negative for dysuria and hematuria.   Musculoskeletal:  Negative for arthralgias and myalgias.        Muscle cramping     Skin:  Negative for pallor and rash.        itching   Neurological:  Negative for tremors and seizures.   Hematological:  Negative for adenopathy. Does not bruise/bleed easily.   Psychiatric/Behavioral:  Negative for hallucinations.    All other systems reviewed and are negative.      Objective:      Physical Exam  Vitals and nursing note reviewed.   Constitutional:       General: She is awake. She is not in acute distress.     Appearance: Normal appearance. She is well-developed. She is not ill-appearing.   HENT:      Head: Normocephalic and atraumatic.      Mouth/Throat:      Lips: Pink.      Mouth: Mucous membranes are moist.   Eyes:      Conjunctiva/sclera: Conjunctivae normal.      Pupils: " Pupils are equal, round, and reactive to light.   Neck:      Thyroid: No thyromegaly.      Vascular: No JVD.   Cardiovascular:      Rate and Rhythm: Normal rate and regular rhythm.      Heart sounds: Normal heart sounds, S1 normal and S2 normal. No murmur heard.     No friction rub. No gallop.   Pulmonary:      Effort: Pulmonary effort is normal.      Breath sounds: Normal breath sounds.   Abdominal:      General: Bowel sounds are normal. There is no distension.      Palpations: Abdomen is soft. There is no mass.      Tenderness: There is no abdominal tenderness.   Musculoskeletal:         General: Normal range of motion.      Cervical back: Full passive range of motion without pain, normal range of motion and neck supple.      Right lower leg: Edema present.      Left lower leg: Edema present.   Skin:     General: Skin is warm and dry.      Capillary Refill: Capillary refill takes less than 2 seconds.   Neurological:      General: No focal deficit present.      Mental Status: She is alert and oriented to person, place, and time. Mental status is at baseline.      GCS: GCS eye subscore is 4. GCS verbal subscore is 5. GCS motor subscore is 6.      Cranial Nerves: No cranial nerve deficit.      Sensory: Sensation is intact.      Motor: Motor function is intact.   Psychiatric:         Behavior: Behavior normal. Behavior is cooperative.         Assessment:       1. Congestive heart failure, unspecified HF chronicity, unspecified heart failure type    2. NICOLE (obstructive sleep apnea)    3. Pruritus    4. Muscle cramps        Plan:       Juhi was seen today for hospital follow up.    Diagnoses and all orders for this visit:    CHF  NICOLE (obstructive sleep apnea)  -     Ambulatory referral/consult to Sleep Disorders; Future  -     BiPAP/CPAP Titration ((Must have dx of NICOLE from previous sleep study); Future    Pruritus  -     hydrOXYzine HCL (ATARAX) 25 MG tablet; Take 1 tablet (25 mg total) by mouth 3 (three) times  daily as needed for Itching.   Per Uptodate, recommended pramoxine or an emmollient with paraffin/glycerol combination to help with itching    Muscle cramps   Ok to increase magnesium to twice a day as needed for cramping

## 2024-03-27 NOTE — PATIENT INSTRUCTIONS
Things to try for itching:   Pramoxine (look for it by the hemorrhoid treatments)   Emollients that contain glycerin/parrafin-shown to help after using for about a week    Increase magnesium to twice a day to help with cramping if you need to, max twice a day

## 2024-03-31 LAB
OHS QRS DURATION: 144 MS
OHS QTC CALCULATION: 488 MS

## 2024-04-04 ENCOUNTER — PATIENT MESSAGE (OUTPATIENT)
Dept: ADMINISTRATIVE | Facility: HOSPITAL | Age: 74
End: 2024-04-04
Payer: MEDICARE

## 2024-04-19 ENCOUNTER — CLINICAL SUPPORT (OUTPATIENT)
Dept: OPHTHALMOLOGY | Facility: CLINIC | Age: 74
End: 2024-04-19
Payer: MEDICARE

## 2024-04-19 ENCOUNTER — OFFICE VISIT (OUTPATIENT)
Dept: OPTOMETRY | Facility: CLINIC | Age: 74
End: 2024-04-19
Payer: MEDICARE

## 2024-04-19 DIAGNOSIS — H40.1131 PRIMARY OPEN ANGLE GLAUCOMA (POAG) OF BOTH EYES, MILD STAGE: Primary | ICD-10-CM

## 2024-04-19 DIAGNOSIS — H26.9 CORTICAL CATARACT: ICD-10-CM

## 2024-04-19 DIAGNOSIS — E11.9 DIABETES MELLITUS TYPE 2 WITHOUT RETINOPATHY: ICD-10-CM

## 2024-04-19 DIAGNOSIS — H52.7 REFRACTIVE ERROR: ICD-10-CM

## 2024-04-19 DIAGNOSIS — H25.13 NUCLEAR SCLEROSIS, BILATERAL: ICD-10-CM

## 2024-04-19 PROCEDURE — 99999 PR PBB SHADOW E&M-EST. PATIENT-LVL IV: CPT | Mod: PBBFAC,,, | Performed by: OPTOMETRIST

## 2024-04-19 PROCEDURE — 92083 EXTENDED VISUAL FIELD XM: CPT | Mod: PBBFAC,PO | Performed by: OPTOMETRIST

## 2024-04-19 PROCEDURE — 99214 OFFICE O/P EST MOD 30 MIN: CPT | Mod: S$PBB,,, | Performed by: OPTOMETRIST

## 2024-04-19 PROCEDURE — 99214 OFFICE O/P EST MOD 30 MIN: CPT | Mod: PBBFAC,PO,25 | Performed by: OPTOMETRIST

## 2024-04-19 PROCEDURE — 92133 CPTRZD OPH DX IMG PST SGM ON: CPT | Mod: PBBFAC,PO | Performed by: OPTOMETRIST

## 2024-04-19 NOTE — PROGRESS NOTES
HPI    74 YO female presents today for an annual eye exam/hvf review. Patient   states that she is doing well, notes no problems or complaints. Wears   glasses all the time, would like updated prescription.     Thera tears OU PRN   Last edited by Philly Segovia on 4/19/2024  2:11 PM.            Assessment /Plan     For exam results, see Encounter Report.    Primary open angle glaucoma (POAG) of both eyes, mild stage  -     Posterior Segment OCT Optic Nerve- Both eyes  -     Bragg Visual Field - OU - Extended - Both Eyes    Nuclear sclerosis, bilateral  -     Ambulatory referral/consult to Ophthalmology; Future; Expected date: 05/19/2024    Cortical cataract    Refractive error    Diabetes mellitus type 2 without retinopathy      1. Primary open angle glaucoma (POAG) of both eyes, mild stage  + fam hx  TMax 18/17   /527    Reviewed HVF/OCT today --  OCT -- stable  OD no rnfl thinning   OS ONL TS    HVF --   OD non-specific defects  OS central defect    Pt d/c gtts x 1 month per hospitalist?  Pt notes gtts make her vision dark and cause swelling in her legs  Discussed at length possible permanent loss of vision w/o treatment  Pt does not want to resume drops, does not want to start any other drops    Refer to Dr. Knapp for eval    - Posterior Segment OCT Optic Nerve- Both eyes  - Bragg Visual Field - OU - Extended - Both Eyes    2. Nuclear sclerosis, bilateral  3. Cortical cataract  Moderate OS > OD -- visually significant  Referred to Dr. Knapp for eval    - Ambulatory referral/consult to Ophthalmology; Future    4. Refractive error  No improvement with refraction, no spec rx    5. Diabetes mellitus type 2 without retinopathy  Discussed possible ocular affects of uncontrolled blood sugar with patient.   Recommended continued strong blood sugar control and continued care with PCP.   Monitor yearly.

## 2024-04-30 ENCOUNTER — TELEPHONE (OUTPATIENT)
Dept: OPHTHALMOLOGY | Facility: CLINIC | Age: 74
End: 2024-04-30

## 2024-04-30 ENCOUNTER — OFFICE VISIT (OUTPATIENT)
Dept: OPHTHALMOLOGY | Facility: CLINIC | Age: 74
End: 2024-04-30
Payer: MEDICARE

## 2024-04-30 DIAGNOSIS — H43.813 POSTERIOR VITREOUS DETACHMENT OF BOTH EYES: ICD-10-CM

## 2024-04-30 DIAGNOSIS — H25.813 COMBINED FORMS OF AGE-RELATED CATARACT, BILATERAL: Primary | ICD-10-CM

## 2024-04-30 DIAGNOSIS — E11.9 DIABETES MELLITUS TYPE 2 WITHOUT RETINOPATHY: ICD-10-CM

## 2024-04-30 DIAGNOSIS — H40.1132 PRIMARY OPEN-ANGLE GLAUCOMA, BILATERAL, MODERATE STAGE: ICD-10-CM

## 2024-04-30 PROCEDURE — 99204 OFFICE O/P NEW MOD 45 MIN: CPT | Mod: S$PBB,,, | Performed by: OPHTHALMOLOGY

## 2024-04-30 PROCEDURE — 99214 OFFICE O/P EST MOD 30 MIN: CPT | Mod: PBBFAC,PO | Performed by: OPHTHALMOLOGY

## 2024-04-30 PROCEDURE — 99999 PR PBB SHADOW E&M-EST. PATIENT-LVL IV: CPT | Mod: PBBFAC,,, | Performed by: OPHTHALMOLOGY

## 2024-04-30 NOTE — PROGRESS NOTES
HPI    DLS: 1/19/2024- CE vs LPI     Pt states no new complaints.     Was taking latanoprost but it was discontinued in march during a hospital   stay. Hasn't used it since.     Thera tears OU PRN daily.   Last edited by Sravanthi Leiva on 4/30/2024 10:08 AM.            Assessment /Plan     For exam results, see Encounter Report.    Combined forms of age-related cataract, bilateral  -     Ambulatory referral/consult to Ophthalmology    Primary open-angle glaucoma, bilateral, moderate stage    Posterior vitreous detachment of both eyes    Diabetes mellitus type 2 without retinopathy      1. Combined forms of age-related cataract, bilateral  Patient with visually significant cataract impacting abiliity ADLs (reading, driving, PM driving, glare).  Discussed options, R & B, expectations, patient voices good understanding and wishes to proceed with procedure. Patient will likely benefit from sx.    Schedule CEIOL OS streamline, then OD  RTC preop  Will need clearance    2. Primary open-angle glaucoma, bilateral, moderate stage  Neg famhx (sister is suspect)  Thin pachy    ONHs are exacavated  OCT NFL likely early damage  HVF show mild to moderate damage  Pt stopped latanoprost, thought causing leg swelling    Discussed phaco/MIGs, pt interested  See above for phaco/streamline OS then OD    3. Posterior vitreous detachment of both eyes  RD precautions    4. Diabetes mellitus type 2 without retinopathy  Diabetes without retinopathy, discussed with patient importance of glucose control and follow up.  Patient voices understanding.

## 2024-04-30 NOTE — Clinical Note
Plan for cataract surgery + minor glaucoma surgery for this patient, late June to early July.  She is okay to continue blood thinners, but the ASC will want clearance for the sedation required for the procedure, usually within 30 days prior to the procedure.

## 2024-04-30 NOTE — PATIENT INSTRUCTIONS
What are floaters?     Floaters look like small specks, dots, circles, lines or cobwebs in your field of vision. While they seem to be in front of your eye, they are floating inside. Floaters are tiny clumps of gel or cells inside the vitreous that fills your eye. What you see are the shadows these clumps cast on your retina.    You usually notice floaters when looking  at something plain, like a blank wall or a blue aditya.    As we age, our vitreous starts to thicken or shrink. Sometimes clumps or strands form in the vitreous. If the vitreous pulls away from the back of the eye, it is called posterior vitreous detachment. Floaters usually happen with posterior vitreous detachment. They are not serious, and they tend to fade or go away over time. Severe floaters can be removed by surgery, but this has risks and is seldom necessary.    You are more likely to get floaters if you:    are nearsighted (you need glasses to see far away)    have had surgery for cataracts    have had inflammation (swelling) inside the eye      What are flashes?     Flashes can look like flashing lights or lightning streaks in your field of vision. Some people compare them to seeing stars after being hit on the head. You might see flashes on and off for weeks, or even months. Flashes happen when the vitreous rubs or pulls on your retina.    As people age, it is common to see flashes occasionally.       When floaters and flashes are serious     Most floaters and flashes are not a problem. However, there are times when they can be signs of a serious condition. Here is when you should call an ophthalmologist right away:    you notice a lot of new floaters    you have a lot of flashes    a shadow appears in your peripheral (side) vision    a gray curtain covers part of  your vision    These floaters and flashes could be symptoms of a torn or detached retina. This is when the retina pulls away from the back of your eye. This is a serious  condition that needs to be treated.    Summary    Floaters are dark specks or dots in your field of vision. They are shadows you see from clumps of vitreous gel in your eye. Flashes are flashes of light that look like lightning streaks in your field of vision. Flashes occur when the vitreous gel rubs or pulls on your retina.    Floaters and flashes are quite common as people age. However, they can be signs of a retinal detachment, which is a serious problem. If you suddenly have a lot of floaters and see flashes, and you notice changes in your vision, call your ophthalmologist right away.

## 2024-05-08 ENCOUNTER — LAB VISIT (OUTPATIENT)
Dept: LAB | Facility: HOSPITAL | Age: 74
End: 2024-05-08
Attending: INTERNAL MEDICINE
Payer: MEDICARE

## 2024-05-08 DIAGNOSIS — D50.9 IRON DEFICIENCY ANEMIA, UNSPECIFIED: ICD-10-CM

## 2024-05-08 DIAGNOSIS — N18.4 CHRONIC KIDNEY DISEASE, STAGE IV (SEVERE): ICD-10-CM

## 2024-05-08 DIAGNOSIS — R25.2 CRAMP OF LIMB: ICD-10-CM

## 2024-05-08 DIAGNOSIS — N25.81 SECONDARY HYPERPARATHYROIDISM OF RENAL ORIGIN: ICD-10-CM

## 2024-05-08 LAB
ALBUMIN SERPL BCP-MCNC: 3.3 G/DL (ref 3.5–5.2)
ANION GAP SERPL CALC-SCNC: 10 MMOL/L (ref 8–16)
BASOPHILS # BLD AUTO: 0.04 K/UL (ref 0–0.2)
BASOPHILS NFR BLD: 0.5 % (ref 0–1.9)
BUN SERPL-MCNC: 54 MG/DL (ref 8–23)
CALCIUM SERPL-MCNC: 10 MG/DL (ref 8.7–10.5)
CHLORIDE SERPL-SCNC: 106 MMOL/L (ref 95–110)
CO2 SERPL-SCNC: 27 MMOL/L (ref 23–29)
CREAT SERPL-MCNC: 3.2 MG/DL (ref 0.5–1.4)
DIFFERENTIAL METHOD BLD: ABNORMAL
EOSINOPHIL # BLD AUTO: 0.3 K/UL (ref 0–0.5)
EOSINOPHIL NFR BLD: 3.2 % (ref 0–8)
ERYTHROCYTE [DISTWIDTH] IN BLOOD BY AUTOMATED COUNT: 17.7 % (ref 11.5–14.5)
EST. GFR  (NO RACE VARIABLE): 14.9 ML/MIN/1.73 M^2
GLUCOSE SERPL-MCNC: 128 MG/DL (ref 70–110)
HCT VFR BLD AUTO: 38.6 % (ref 37–48.5)
HGB BLD-MCNC: 11.3 G/DL (ref 12–16)
IMM GRANULOCYTES # BLD AUTO: 0.03 K/UL (ref 0–0.04)
IMM GRANULOCYTES NFR BLD AUTO: 0.4 % (ref 0–0.5)
LYMPHOCYTES # BLD AUTO: 1.8 K/UL (ref 1–4.8)
LYMPHOCYTES NFR BLD: 22.3 % (ref 18–48)
MAGNESIUM SERPL-MCNC: 2 MG/DL (ref 1.6–2.6)
MCH RBC QN AUTO: 25 PG (ref 27–31)
MCHC RBC AUTO-ENTMCNC: 29.3 G/DL (ref 32–36)
MCV RBC AUTO: 85 FL (ref 82–98)
MONOCYTES # BLD AUTO: 0.5 K/UL (ref 0.3–1)
MONOCYTES NFR BLD: 6.8 % (ref 4–15)
NEUTROPHILS # BLD AUTO: 5.3 K/UL (ref 1.8–7.7)
NEUTROPHILS NFR BLD: 66.8 % (ref 38–73)
NRBC BLD-RTO: 0 /100 WBC
PHOSPHATE SERPL-MCNC: 3.5 MG/DL (ref 2.7–4.5)
PLATELET # BLD AUTO: 261 K/UL (ref 150–450)
PMV BLD AUTO: 10.5 FL (ref 9.2–12.9)
POTASSIUM SERPL-SCNC: 3.9 MMOL/L (ref 3.5–5.1)
PTH-INTACT SERPL-MCNC: 291.7 PG/ML (ref 9–77)
RBC # BLD AUTO: 4.52 M/UL (ref 4–5.4)
SODIUM SERPL-SCNC: 143 MMOL/L (ref 136–145)
URATE SERPL-MCNC: 5.7 MG/DL (ref 2.4–5.7)
WBC # BLD AUTO: 7.89 K/UL (ref 3.9–12.7)

## 2024-05-08 PROCEDURE — 36415 COLL VENOUS BLD VENIPUNCTURE: CPT | Mod: PN | Performed by: INTERNAL MEDICINE

## 2024-05-08 PROCEDURE — 82652 VIT D 1 25-DIHYDROXY: CPT | Performed by: INTERNAL MEDICINE

## 2024-05-08 PROCEDURE — 85025 COMPLETE CBC W/AUTO DIFF WBC: CPT | Performed by: INTERNAL MEDICINE

## 2024-05-08 PROCEDURE — 83735 ASSAY OF MAGNESIUM: CPT | Performed by: INTERNAL MEDICINE

## 2024-05-08 PROCEDURE — 80069 RENAL FUNCTION PANEL: CPT | Performed by: INTERNAL MEDICINE

## 2024-05-08 PROCEDURE — 84550 ASSAY OF BLOOD/URIC ACID: CPT | Performed by: INTERNAL MEDICINE

## 2024-05-08 PROCEDURE — 82570 ASSAY OF URINE CREATININE: CPT | Performed by: INTERNAL MEDICINE

## 2024-05-08 PROCEDURE — 84156 ASSAY OF PROTEIN URINE: CPT | Performed by: INTERNAL MEDICINE

## 2024-05-08 PROCEDURE — 83970 ASSAY OF PARATHORMONE: CPT | Performed by: INTERNAL MEDICINE

## 2024-05-08 PROCEDURE — 81001 URINALYSIS AUTO W/SCOPE: CPT | Performed by: INTERNAL MEDICINE

## 2024-05-09 LAB
BACTERIA #/AREA URNS AUTO: NORMAL /HPF
BILIRUB UR QL STRIP: NEGATIVE
CLARITY UR REFRACT.AUTO: CLEAR
COLOR UR AUTO: YELLOW
CREAT UR-MCNC: 84 MG/DL (ref 15–325)
GLUCOSE UR QL STRIP: NEGATIVE
HGB UR QL STRIP: NEGATIVE
HYALINE CASTS UR QL AUTO: 0 /LPF
KETONES UR QL STRIP: NEGATIVE
LEUKOCYTE ESTERASE UR QL STRIP: NEGATIVE
MICROSCOPIC COMMENT: NORMAL
NITRITE UR QL STRIP: NEGATIVE
PH UR STRIP: 6 [PH] (ref 5–8)
PROT UR QL STRIP: ABNORMAL
RBC #/AREA URNS AUTO: 1 /HPF (ref 0–4)
SP GR UR STRIP: 1.01 (ref 1–1.03)
SQUAMOUS #/AREA URNS AUTO: 1 /HPF
URN SPEC COLLECT METH UR: ABNORMAL
WBC #/AREA URNS AUTO: 1 /HPF (ref 0–5)

## 2024-05-10 LAB
PROT 24H UR-MRATE: 1425 MG/SPEC (ref 0–100)
PROT UR-MCNC: 95 MG/DL (ref 0–15)
URINE COLLECTION DURATION: 24 HR
URINE VOLUME: 1500 ML

## 2024-05-13 LAB — 1,25(OH)2D3 SERPL-MCNC: 52 PG/ML (ref 20–79)

## 2024-05-22 ENCOUNTER — TELEPHONE (OUTPATIENT)
Dept: SURGERY | Facility: CLINIC | Age: 74
End: 2024-05-22
Payer: MEDICARE

## 2024-05-27 ENCOUNTER — OFFICE VISIT (OUTPATIENT)
Dept: SURGERY | Facility: CLINIC | Age: 74
End: 2024-05-27
Payer: MEDICARE

## 2024-05-27 VITALS
BODY MASS INDEX: 45.99 KG/M2 | WEIGHT: 293 LBS | SYSTOLIC BLOOD PRESSURE: 171 MMHG | HEIGHT: 67 IN | DIASTOLIC BLOOD PRESSURE: 83 MMHG | TEMPERATURE: 98 F | HEART RATE: 61 BPM

## 2024-05-27 DIAGNOSIS — N18.5 CHRONIC KIDNEY DISEASE (CKD), STAGE V: Primary | ICD-10-CM

## 2024-05-27 PROCEDURE — 99215 OFFICE O/P EST HI 40 MIN: CPT | Mod: PBBFAC,PN | Performed by: SURGERY

## 2024-05-27 PROCEDURE — 99203 OFFICE O/P NEW LOW 30 MIN: CPT | Mod: S$PBB,,, | Performed by: SURGERY

## 2024-05-27 PROCEDURE — 99999 PR PBB SHADOW E&M-EST. PATIENT-LVL V: CPT | Mod: PBBFAC,,, | Performed by: SURGERY

## 2024-05-27 RX ORDER — CEFAZOLIN SODIUM 2 G/50ML
2 SOLUTION INTRAVENOUS
Status: CANCELLED | OUTPATIENT
Start: 2024-05-27

## 2024-05-28 NOTE — H&P (VIEW-ONLY)
GENERAL SURGERY  OUTPATIENT H&P    REASON FOR VISIT/CC: Peritoneal dialysis catheter placement    HPI: Juhi Taylor is a 73 y.o. female with chronic kidney disease heading to need for dialysis, hypertension, type 2 diabetes, CAD, history of nephrectomy, PID on long-term anticoagulation who presents for evaluation for peritoneal dialysis catheter placement.  Patient is progressing in her kidney disease and will need dialysis soon. After discussion with nephrologist and performing self research she was elected to proceed with peritoneal dialysis. She was had a few different abdominal surgeries which include a robotic nephrectomy. She was never had a central line or need for dialysis in the past.  She does feel like she was starting to retain more fluid in her ankles.    I have reviewed the patient's chart including prior progress notes, procedures and testing.     ROS:   Review of Systems    PROBLEM LIST:  Patient Active Problem List   Diagnosis    Coronary artery disease    Hyperlipemia    Essential hypertension    Type 2 diabetes mellitus with microalbuminuria, without long-term current use of insulin    Morbid obesity with body mass index (BMI) greater than or equal to 50    Elevated blood pressure reading with diagnosis of hypertension    Lower leg edema    NICOLE (obstructive sleep apnea)    Non-rheumatic mitral regurgitation    Diastolic dysfunction    Statin intolerance    Thyroid nodule    Vitamin D deficiency    Hypothyroidism    Gout    Hyperparathyroidism    Goiter    Thyroiditis    Abnormal thyroid blood test    Nodular thyroid disease    Hyperuricemia    Type 2 diabetes mellitus with hyperglycemia    Proteinuria    Right kidney mass    GERD (gastroesophageal reflux disease)    Pulmonary nodule    Congestive heart failure    SOB (shortness of breath)    S/P coronary artery stent placement    History of breast cancer in female    Left mastectomy site mass    Obesity, Class III, BMI 40-49.9 (morbid  obesity)    Osteopenia    Postmenopausal    Pulmonary hypertension    Heart failure with preserved ejection fraction    Long term (current) use of antithrombotics/antiplatelets    Diastolic heart failure secondary to hypertension    Osteoarthritis of multiple joints    Vitamin D deficient osteomalacia    Hypoparathyroidism due to impaired secretion of parathyroid hormone (PTH)    Acute on chronic systolic CHF (congestive heart failure)    Elevated BUN    Long term current use of amiodarone    Paroxysmal atrial fibrillation    Situational mixed anxiety and depressive disorder    Iron deficiency anemia    Chronic back pain    Peripheral vascular disease, unspecified    Arthralgia of multiple joints    Drug side effects, initial encounter    Renal cell carcinoma of right kidney    Benign hypertensive heart and kidney disease with CHF, NYHA class 2 and CKD stage 4    CKD stage 4 secondary to hypertension    H/O right nephrectomy    Hypomagnesemia    Chronic kidney disease (CKD), stage V    Chronic combined systolic and diastolic heart failure    Chronic renal failure (CRF), stage 4 (severe)    Myalgia due to statin    Hyperparathyroidism, secondary renal    CKD (chronic kidney disease), stage IV         HISTORY  Past Medical History:   Diagnosis Date    Anticoagulant long-term use     Arthritis     Breast cancer 2014    invasive lobular carcinoma    Cancer of kidney 11/2020    RIGHT KIDNEY CANCER    CHF (congestive heart failure)     Coronary artery disease dx 2005    Depression     Diabetes mellitus     Diastolic heart failure secondary to hypertension     Gout     Hyperlipemia     Hypertension     Hypertrophy of nasal turbinates     Kidney mass 2020    Right    Levoscoliosis     Lung nodule     left    Multiple thyroid nodules     NICOLE (obstructive sleep apnea)     uses C-PAP    Pulmonary hypertension        Past Surgical History:   Procedure Laterality Date    AORTOGRAPHY N/A 12/04/2020    Procedure: Aortogram;   "Surgeon: Paul Pedersen MD;  Location: [facility] CATH;  Service: Cardiology;  Laterality: N/A;    BREAST SURGERY      CARDIAC CATHETERIZATION  2020    CHOLECYSTECTOMY      COLONOSCOPY      multi -last      CORONARY ARTERY BYPASS GRAFT      ESOPHAGOGASTRODUODENOSCOPY      2012     HAND SURGERY Right     LAPAROSCOPIC ROBOT-ASSISTED SURGICAL REMOVAL OF KIDNEY USING DA CHELLE XI Right 03/10/2022    Procedure: XI ROBOTIC NEPHRECTOMY- radical;  Surgeon: Rolando Ramirez MD;  Location: [facility] OR;  Service: Urology;  Laterality: Right;    MASTECTOMY W/ SENTINEL NODE BIOPSY Bilateral 2015    bilateral "dog ears"    NASAL SINUS SURGERY      Dr Bryant FESS/cauterization turbinate     PARTIAL HYSTERECTOMY      PERCUTANEOUS TRANSLUMINAL BALLOON ANGIOPLASTY OF CORONARY ARTERY  2020    Procedure: Angioplasty-coronary;  Surgeon: Paul Pedersen MD;  Location: [facility] CATH;  Service: Cardiology;;    RENAL BIOPSY Right     2021 EJ    TUBAL LIGATION      ULTRASOUND GUIDANCE  2020    Procedure: Ultrasound Guidance;  Surgeon: Paul Pedersen MD;  Location: [facility] CATH;  Service: Cardiology;;       Social History     Tobacco Use    Smoking status: Former     Current packs/day: 0.00     Types: Cigarettes     Start date: 2016     Quit date: 2016     Years since quittin.4    Smokeless tobacco: Never    Tobacco comments:     quit    Substance Use Topics    Alcohol use: No    Drug use: No       Family History   Problem Relation Name Age of Onset    Breast cancer Mother      Stroke Father Waqar Sr.     Hypertension Father Waqar Sr.     Hepatitis Brother      Asthma Daughter Nell Taylor     Birth defects Daughter Nell Camejo's two children has cleft lips    Depression Daughter Nell Taylor     Drug abuse Daughter Nell Taylor     Learning disabilities Daughter Nell Taylor     Mental illness Daughter Nell Taylor     Breast cancer Maternal Aunt      Glaucoma " Sister      Drug abuse Daughter Nell     Macular degeneration Neg Hx      Retinal detachment Neg Hx           MEDS:  Current Outpatient Medications on File Prior to Visit   Medication Sig Dispense Refill    allopurinoL (ZYLOPRIM) 300 MG tablet Take 1 tablet (300 mg total) by mouth once daily. 90 tablet 3    amLODIPine (NORVASC) 10 MG tablet Take 10 mg by mouth once daily.      apixaban (ELIQUIS) 5 mg Tab Take 1 tablet (5 mg total) by mouth 2 (two) times daily. 180 tablet 2    blood sugar diagnostic (TRUE METRIX GLUCOSE TEST STRIP) Strp Use 1x daily. Insurance preferred. 100 strip 3    blood sugar diagnostic Strp To check BG 1 time daily, to use with insurance preferred meter 100 strip 3    calcitRIOL (ROCALTROL) 0.5 MCG Cap Take 0.5 mcg by mouth once daily.      carvediloL (COREG) 25 MG tablet Take 1 tablet (25 mg total) by mouth 2 (two) times daily. 180 tablet 2    cloNIDine (CATAPRES) 0.1 MG tablet Take 1 tablet (0.1 mg total) by mouth 3 (three) times daily as needed (PRN SBP > 165 mmHg). 90 tablet 6    cloNIDine 0.1 mg/24 hr td ptwk (CATAPRES) 0.1 mg/24 hr Place 1 patch onto the skin every 7 days. 4 patch 4    cyanocobalamin (VITAMIN B-12) 100 MCG tablet Take 100 mcg by mouth once daily.      evolocumab (REPATHA SURECLICK) 140 mg/mL PnIj Inject 1 mL (140 mg total) into the skin every 14 (fourteen) days. 2 mL 11    lancets Misc To check BG 1 times daily, to use with insurance preferred meter 100 each 3    loratadine (CLARITIN) 10 mg tablet Take 10 mg by mouth once daily.      magnesium oxide (MAG-OX) 400 mg (241.3 mg magnesium) tablet Take 1 tablet (400 mg total) by mouth daily as needed (cramping). 30 tablet 0    metOLazone (ZAROXOLYN) 2.5 MG tablet Take 2.5 mg by mouth nightly.      nitroGLYCERIN (NITROSTAT) 0.4 MG SL tablet Place 1 tablet (0.4 mg total) under the tongue every 5 (five) minutes as needed for Chest pain. 25 tablet 0    torsemide (DEMADEX) 20 MG Tab Take 1 tablet (20 mg total) by mouth once daily.  30 tablet 11    [DISCONTINUED] aspirin (ECOTRIN) 81 MG EC tablet Take 1 tablet (81 mg total) by mouth once daily. 90 tablet 3    [DISCONTINUED] DULoxetine (CYMBALTA) 20 MG capsule TAKE ONE CAPSULE BY MOUTH ONCE DAILY 30 capsule 4    [DISCONTINUED] folic acid (FOLVITE) 1 MG tablet Take 1 mg by mouth once daily.      [DISCONTINUED] metFORMIN (GLUCOPHAGE-XR) 500 MG ER 24hr tablet Take 2 tablets (1,000 mg total) by mouth 2 (two) times daily with meals. 360 tablet 3    [DISCONTINUED] sodium bicarbonate 650 MG tablet Take 1 tablet (650 mg total) by mouth once daily. 30 tablet 11     No current facility-administered medications on file prior to visit.       ALLERGIES:  Review of patient's allergies indicates:   Allergen Reactions    Percocet [oxycodone-acetaminophen] Itching    Amiodarone analogues      Itching      Irbesartan Swelling    Januvia [sitagliptin]     Jardiance [empagliflozin]      Leg cramps    Lipitor [atorvastatin] Other (See Comments)     Severe leg pain    Linaclotide Other (See Comments) and Nausea And Vomiting     Does not remember    Lubiprostone Other (See Comments) and Palpitations     Does not remember         VITALS:  Vitals:    05/27/24 1550   BP: (!) 171/83   Pulse: 61   Temp: 97.8 °F (36.6 °C)         PHYSICAL EXAM:  Physical Exam  Vitals reviewed.   Constitutional:       General: She is not in acute distress.     Appearance: Normal appearance. She is well-developed.   HENT:      Head: Normocephalic and atraumatic.      Nose: Nose normal.   Eyes:      General: No scleral icterus.     Conjunctiva/sclera: Conjunctivae normal.   Neck:      Trachea: No tracheal tenderness or tracheal deviation.   Cardiovascular:      Rate and Rhythm: Normal rate and regular rhythm.      Pulses: Normal pulses.   Pulmonary:      Effort: Pulmonary effort is normal. No accessory muscle usage or respiratory distress.      Breath sounds: Normal breath sounds.   Abdominal:      General: There is no distension.       Palpations: Abdomen is soft.      Tenderness: There is no abdominal tenderness.      Comments: Well-healed surgical incisions of the abdominal wall   Musculoskeletal:         General: No swelling or tenderness. Normal range of motion.      Cervical back: Normal range of motion and neck supple. No rigidity.      Right lower leg: Edema present.      Left lower leg: Edema present.   Skin:     General: Skin is warm and dry.      Coloration: Skin is not jaundiced.      Findings: No erythema.   Neurological:      General: No focal deficit present.      Mental Status: She is alert and oriented to person, place, and time.      Motor: No weakness or abnormal muscle tone.   Psychiatric:         Mood and Affect: Mood normal.         Behavior: Behavior normal.         Thought Content: Thought content normal.         Judgment: Judgment normal.           LABS:  Lab Results   Component Value Date    WBC 7.89 05/08/2024    RBC 4.52 05/08/2024    HGB 11.3 (L) 05/08/2024    HCT 38.6 05/08/2024     05/08/2024     Lab Results   Component Value Date     (H) 05/08/2024     05/08/2024    K 3.9 05/08/2024     05/08/2024    CO2 27 05/08/2024    BUN 54 (H) 05/08/2024    CREATININE 3.2 (H) 05/08/2024    CALCIUM 10.0 05/08/2024     Lab Results   Component Value Date    ALT 40 03/14/2024    AST 20 03/14/2024    ALKPHOS 104 03/14/2024    BILITOT 0.5 03/14/2024     Lab Results   Component Value Date    MG 2.0 05/08/2024    PHOS 3.5 05/08/2024         ASSESSMENT & PLAN:  73 y.o. female with chronic kidney disease progressing to end-stage  -patient has discuss peritoneal dialysis with her nephrologist and it was wishing to proceed with this once dialysis is required  -I have discussed with her the risks and benefits of peritoneal dialysis as well, risks for surgical placement include injury to the bowel, injury to other organ, malfunction, infection, peritonitis, poor flows, etc., patient expressed understanding these  risks and agreed proceed with surgical intervention, she will also likely require some lysis of adhesions due to previous abdominal surgeries  -will schedule for placement on 06/04/2024  -will get updated labs, EKG up-to-date, does have cardiologist

## 2024-05-28 NOTE — H&P
GENERAL SURGERY  OUTPATIENT H&P    REASON FOR VISIT/CC: Peritoneal dialysis catheter placement    HPI: Juhi Taylor is a 73 y.o. female with chronic kidney disease heading to need for dialysis, hypertension, type 2 diabetes, CAD, history of nephrectomy, PID on long-term anticoagulation who presents for evaluation for peritoneal dialysis catheter placement.  Patient is progressing in her kidney disease and will need dialysis soon. After discussion with nephrologist and performing self research she was elected to proceed with peritoneal dialysis. She was had a few different abdominal surgeries which include a robotic nephrectomy. She was never had a central line or need for dialysis in the past.  She does feel like she was starting to retain more fluid in her ankles.    I have reviewed the patient's chart including prior progress notes, procedures and testing.     ROS:   Review of Systems    PROBLEM LIST:  Patient Active Problem List   Diagnosis    Coronary artery disease    Hyperlipemia    Essential hypertension    Type 2 diabetes mellitus with microalbuminuria, without long-term current use of insulin    Morbid obesity with body mass index (BMI) greater than or equal to 50    Elevated blood pressure reading with diagnosis of hypertension    Lower leg edema    NICOLE (obstructive sleep apnea)    Non-rheumatic mitral regurgitation    Diastolic dysfunction    Statin intolerance    Thyroid nodule    Vitamin D deficiency    Hypothyroidism    Gout    Hyperparathyroidism    Goiter    Thyroiditis    Abnormal thyroid blood test    Nodular thyroid disease    Hyperuricemia    Type 2 diabetes mellitus with hyperglycemia    Proteinuria    Right kidney mass    GERD (gastroesophageal reflux disease)    Pulmonary nodule    Congestive heart failure    SOB (shortness of breath)    S/P coronary artery stent placement    History of breast cancer in female    Left mastectomy site mass    Obesity, Class III, BMI 40-49.9 (morbid  obesity)    Osteopenia    Postmenopausal    Pulmonary hypertension    Heart failure with preserved ejection fraction    Long term (current) use of antithrombotics/antiplatelets    Diastolic heart failure secondary to hypertension    Osteoarthritis of multiple joints    Vitamin D deficient osteomalacia    Hypoparathyroidism due to impaired secretion of parathyroid hormone (PTH)    Acute on chronic systolic CHF (congestive heart failure)    Elevated BUN    Long term current use of amiodarone    Paroxysmal atrial fibrillation    Situational mixed anxiety and depressive disorder    Iron deficiency anemia    Chronic back pain    Peripheral vascular disease, unspecified    Arthralgia of multiple joints    Drug side effects, initial encounter    Renal cell carcinoma of right kidney    Benign hypertensive heart and kidney disease with CHF, NYHA class 2 and CKD stage 4    CKD stage 4 secondary to hypertension    H/O right nephrectomy    Hypomagnesemia    Chronic kidney disease (CKD), stage V    Chronic combined systolic and diastolic heart failure    Chronic renal failure (CRF), stage 4 (severe)    Myalgia due to statin    Hyperparathyroidism, secondary renal    CKD (chronic kidney disease), stage IV         HISTORY  Past Medical History:   Diagnosis Date    Anticoagulant long-term use     Arthritis     Breast cancer 2014    invasive lobular carcinoma    Cancer of kidney 11/2020    RIGHT KIDNEY CANCER    CHF (congestive heart failure)     Coronary artery disease dx 2005    Depression     Diabetes mellitus     Diastolic heart failure secondary to hypertension     Gout     Hyperlipemia     Hypertension     Hypertrophy of nasal turbinates     Kidney mass 2020    Right    Levoscoliosis     Lung nodule     left    Multiple thyroid nodules     NICOLE (obstructive sleep apnea)     uses C-PAP    Pulmonary hypertension        Past Surgical History:   Procedure Laterality Date    AORTOGRAPHY N/A 12/04/2020    Procedure: Aortogram;   "Surgeon: Paul Pedersen MD;  Location: [facility] CATH;  Service: Cardiology;  Laterality: N/A;    BREAST SURGERY      CARDIAC CATHETERIZATION  2020    CHOLECYSTECTOMY      COLONOSCOPY      multi -last      CORONARY ARTERY BYPASS GRAFT      ESOPHAGOGASTRODUODENOSCOPY      2012     HAND SURGERY Right     LAPAROSCOPIC ROBOT-ASSISTED SURGICAL REMOVAL OF KIDNEY USING DA CHELLE XI Right 03/10/2022    Procedure: XI ROBOTIC NEPHRECTOMY- radical;  Surgeon: Rolando Ramirez MD;  Location: [facility] OR;  Service: Urology;  Laterality: Right;    MASTECTOMY W/ SENTINEL NODE BIOPSY Bilateral 2015    bilateral "dog ears"    NASAL SINUS SURGERY      Dr Bryant FESS/cauterization turbinate     PARTIAL HYSTERECTOMY      PERCUTANEOUS TRANSLUMINAL BALLOON ANGIOPLASTY OF CORONARY ARTERY  2020    Procedure: Angioplasty-coronary;  Surgeon: Paul Pedersen MD;  Location: [facility] CATH;  Service: Cardiology;;    RENAL BIOPSY Right     2021 EJ    TUBAL LIGATION      ULTRASOUND GUIDANCE  2020    Procedure: Ultrasound Guidance;  Surgeon: Paul Pedersen MD;  Location: [facility] CATH;  Service: Cardiology;;       Social History     Tobacco Use    Smoking status: Former     Current packs/day: 0.00     Types: Cigarettes     Start date: 2016     Quit date: 2016     Years since quittin.4    Smokeless tobacco: Never    Tobacco comments:     quit    Substance Use Topics    Alcohol use: No    Drug use: No       Family History   Problem Relation Name Age of Onset    Breast cancer Mother      Stroke Father Waqar Sr.     Hypertension Father Waqar Sr.     Hepatitis Brother      Asthma Daughter Nell Taylor     Birth defects Daughter Nell Camejo's two children has cleft lips    Depression Daughter Nell Taylor     Drug abuse Daughter Nell Taylor     Learning disabilities Daughter Nell Taylor     Mental illness Daughter Nell Taylor     Breast cancer Maternal Aunt      Glaucoma " Sister      Drug abuse Daughter Nell     Macular degeneration Neg Hx      Retinal detachment Neg Hx           MEDS:  Current Outpatient Medications on File Prior to Visit   Medication Sig Dispense Refill    allopurinoL (ZYLOPRIM) 300 MG tablet Take 1 tablet (300 mg total) by mouth once daily. 90 tablet 3    amLODIPine (NORVASC) 10 MG tablet Take 10 mg by mouth once daily.      apixaban (ELIQUIS) 5 mg Tab Take 1 tablet (5 mg total) by mouth 2 (two) times daily. 180 tablet 2    blood sugar diagnostic (TRUE METRIX GLUCOSE TEST STRIP) Strp Use 1x daily. Insurance preferred. 100 strip 3    blood sugar diagnostic Strp To check BG 1 time daily, to use with insurance preferred meter 100 strip 3    calcitRIOL (ROCALTROL) 0.5 MCG Cap Take 0.5 mcg by mouth once daily.      carvediloL (COREG) 25 MG tablet Take 1 tablet (25 mg total) by mouth 2 (two) times daily. 180 tablet 2    cloNIDine (CATAPRES) 0.1 MG tablet Take 1 tablet (0.1 mg total) by mouth 3 (three) times daily as needed (PRN SBP > 165 mmHg). 90 tablet 6    cloNIDine 0.1 mg/24 hr td ptwk (CATAPRES) 0.1 mg/24 hr Place 1 patch onto the skin every 7 days. 4 patch 4    cyanocobalamin (VITAMIN B-12) 100 MCG tablet Take 100 mcg by mouth once daily.      evolocumab (REPATHA SURECLICK) 140 mg/mL PnIj Inject 1 mL (140 mg total) into the skin every 14 (fourteen) days. 2 mL 11    lancets Misc To check BG 1 times daily, to use with insurance preferred meter 100 each 3    loratadine (CLARITIN) 10 mg tablet Take 10 mg by mouth once daily.      magnesium oxide (MAG-OX) 400 mg (241.3 mg magnesium) tablet Take 1 tablet (400 mg total) by mouth daily as needed (cramping). 30 tablet 0    metOLazone (ZAROXOLYN) 2.5 MG tablet Take 2.5 mg by mouth nightly.      nitroGLYCERIN (NITROSTAT) 0.4 MG SL tablet Place 1 tablet (0.4 mg total) under the tongue every 5 (five) minutes as needed for Chest pain. 25 tablet 0    torsemide (DEMADEX) 20 MG Tab Take 1 tablet (20 mg total) by mouth once daily.  30 tablet 11    [DISCONTINUED] aspirin (ECOTRIN) 81 MG EC tablet Take 1 tablet (81 mg total) by mouth once daily. 90 tablet 3    [DISCONTINUED] DULoxetine (CYMBALTA) 20 MG capsule TAKE ONE CAPSULE BY MOUTH ONCE DAILY 30 capsule 4    [DISCONTINUED] folic acid (FOLVITE) 1 MG tablet Take 1 mg by mouth once daily.      [DISCONTINUED] metFORMIN (GLUCOPHAGE-XR) 500 MG ER 24hr tablet Take 2 tablets (1,000 mg total) by mouth 2 (two) times daily with meals. 360 tablet 3    [DISCONTINUED] sodium bicarbonate 650 MG tablet Take 1 tablet (650 mg total) by mouth once daily. 30 tablet 11     No current facility-administered medications on file prior to visit.       ALLERGIES:  Review of patient's allergies indicates:   Allergen Reactions    Percocet [oxycodone-acetaminophen] Itching    Amiodarone analogues      Itching      Irbesartan Swelling    Januvia [sitagliptin]     Jardiance [empagliflozin]      Leg cramps    Lipitor [atorvastatin] Other (See Comments)     Severe leg pain    Linaclotide Other (See Comments) and Nausea And Vomiting     Does not remember    Lubiprostone Other (See Comments) and Palpitations     Does not remember         VITALS:  Vitals:    05/27/24 1550   BP: (!) 171/83   Pulse: 61   Temp: 97.8 °F (36.6 °C)         PHYSICAL EXAM:  Physical Exam  Vitals reviewed.   Constitutional:       General: She is not in acute distress.     Appearance: Normal appearance. She is well-developed.   HENT:      Head: Normocephalic and atraumatic.      Nose: Nose normal.   Eyes:      General: No scleral icterus.     Conjunctiva/sclera: Conjunctivae normal.   Neck:      Trachea: No tracheal tenderness or tracheal deviation.   Cardiovascular:      Rate and Rhythm: Normal rate and regular rhythm.      Pulses: Normal pulses.   Pulmonary:      Effort: Pulmonary effort is normal. No accessory muscle usage or respiratory distress.      Breath sounds: Normal breath sounds.   Abdominal:      General: There is no distension.       Palpations: Abdomen is soft.      Tenderness: There is no abdominal tenderness.      Comments: Well-healed surgical incisions of the abdominal wall   Musculoskeletal:         General: No swelling or tenderness. Normal range of motion.      Cervical back: Normal range of motion and neck supple. No rigidity.      Right lower leg: Edema present.      Left lower leg: Edema present.   Skin:     General: Skin is warm and dry.      Coloration: Skin is not jaundiced.      Findings: No erythema.   Neurological:      General: No focal deficit present.      Mental Status: She is alert and oriented to person, place, and time.      Motor: No weakness or abnormal muscle tone.   Psychiatric:         Mood and Affect: Mood normal.         Behavior: Behavior normal.         Thought Content: Thought content normal.         Judgment: Judgment normal.           LABS:  Lab Results   Component Value Date    WBC 7.89 05/08/2024    RBC 4.52 05/08/2024    HGB 11.3 (L) 05/08/2024    HCT 38.6 05/08/2024     05/08/2024     Lab Results   Component Value Date     (H) 05/08/2024     05/08/2024    K 3.9 05/08/2024     05/08/2024    CO2 27 05/08/2024    BUN 54 (H) 05/08/2024    CREATININE 3.2 (H) 05/08/2024    CALCIUM 10.0 05/08/2024     Lab Results   Component Value Date    ALT 40 03/14/2024    AST 20 03/14/2024    ALKPHOS 104 03/14/2024    BILITOT 0.5 03/14/2024     Lab Results   Component Value Date    MG 2.0 05/08/2024    PHOS 3.5 05/08/2024         ASSESSMENT & PLAN:  73 y.o. female with chronic kidney disease progressing to end-stage  -patient has discuss peritoneal dialysis with her nephrologist and it was wishing to proceed with this once dialysis is required  -I have discussed with her the risks and benefits of peritoneal dialysis as well, risks for surgical placement include injury to the bowel, injury to other organ, malfunction, infection, peritonitis, poor flows, etc., patient expressed understanding these  risks and agreed proceed with surgical intervention, she will also likely require some lysis of adhesions due to previous abdominal surgeries  -will schedule for placement on 06/04/2024  -will get updated labs, EKG up-to-date, does have cardiologist

## 2024-05-30 ENCOUNTER — TELEPHONE (OUTPATIENT)
Dept: FAMILY MEDICINE | Facility: CLINIC | Age: 74
End: 2024-05-30
Payer: MEDICARE

## 2024-05-30 NOTE — TELEPHONE ENCOUNTER
----- Message from Bartolo Knapp MD sent at 4/30/2024 10:55 AM CDT -----  Plan for cataract surgery + minor glaucoma surgery for this patient, late June to early July.  She is okay to continue blood thinners, but the ASC will want clearance for the sedation required for the procedure, usually within 30 days prior to the procedure.

## 2024-05-31 ENCOUNTER — HOSPITAL ENCOUNTER (OUTPATIENT)
Dept: RADIOLOGY | Facility: HOSPITAL | Age: 74
Discharge: HOME | End: 2024-05-31
Attending: SURGERY
Payer: MEDICARE

## 2024-05-31 ENCOUNTER — ANESTHESIA EVENT (OUTPATIENT)
Dept: SURGERY | Facility: HOSPITAL | Age: 74
End: 2024-05-31
Payer: MEDICARE

## 2024-05-31 ENCOUNTER — HOSPITAL ENCOUNTER (OUTPATIENT)
Dept: PREADMISSION TESTING | Facility: HOSPITAL | Age: 74
Discharge: HOME | End: 2024-05-31
Attending: SURGERY
Payer: MEDICARE

## 2024-05-31 DIAGNOSIS — N18.5 CHRONIC KIDNEY DISEASE (CKD), STAGE V: ICD-10-CM

## 2024-05-31 DIAGNOSIS — Z01.818 PREOP TESTING: Primary | ICD-10-CM

## 2024-05-31 PROCEDURE — 71046 X-RAY EXAM CHEST 2 VIEWS: CPT | Mod: TC

## 2024-05-31 PROCEDURE — 71046 X-RAY EXAM CHEST 2 VIEWS: CPT | Mod: 26,,, | Performed by: RADIOLOGY

## 2024-05-31 NOTE — OR NURSING
Attempted PAT via phone. N/A. LM to call back for preop instructions and to also read instructions sent to [employer].

## 2024-05-31 NOTE — OR NURSING
Patient returned call and stated read all instructions. Verbalized understanding. Stated outside blood work ordered and wanted to come in for that and ordered CXR.

## 2024-06-04 ENCOUNTER — ANESTHESIA (OUTPATIENT)
Dept: SURGERY | Facility: HOSPITAL | Age: 74
End: 2024-06-04
Payer: MEDICARE

## 2024-06-04 ENCOUNTER — HOSPITAL ENCOUNTER (OUTPATIENT)
Facility: HOSPITAL | Age: 74
Discharge: HOME | End: 2024-06-04
Attending: SURGERY | Admitting: SURGERY
Payer: MEDICARE

## 2024-06-04 DIAGNOSIS — N18.5 CHRONIC KIDNEY DISEASE (CKD), STAGE V: ICD-10-CM

## 2024-06-04 DIAGNOSIS — N18.4 CKD (CHRONIC KIDNEY DISEASE), STAGE IV: Primary | ICD-10-CM

## 2024-06-04 PROCEDURE — 36000706: Performed by: SURGERY

## 2024-06-04 PROCEDURE — 25000003 PHARM REV CODE 250: Performed by: NURSE ANESTHETIST, CERTIFIED REGISTERED

## 2024-06-04 PROCEDURE — 94799 UNLISTED PULMONARY SVC/PX: CPT

## 2024-06-04 PROCEDURE — 49324 LAP INSERT TUNNEL IP CATH: CPT | Mod: ,,, | Performed by: SURGERY

## 2024-06-04 PROCEDURE — 71000015 HC POSTOP RECOV 1ST HR: Performed by: SURGERY

## 2024-06-04 PROCEDURE — 71000033 HC RECOVERY, INTIAL HOUR: Performed by: SURGERY

## 2024-06-04 PROCEDURE — 71000016 HC POSTOP RECOV ADDL HR: Performed by: SURGERY

## 2024-06-04 PROCEDURE — 63600175 PHARM REV CODE 636 W HCPCS: Performed by: NURSE ANESTHETIST, CERTIFIED REGISTERED

## 2024-06-04 PROCEDURE — 36000707: Performed by: SURGERY

## 2024-06-04 PROCEDURE — 37000008 HC ANESTHESIA 1ST 15 MINUTES: Performed by: SURGERY

## 2024-06-04 PROCEDURE — 63600175 PHARM REV CODE 636 W HCPCS: Performed by: SURGERY

## 2024-06-04 PROCEDURE — 37000009 HC ANESTHESIA EA ADD 15 MINS: Performed by: SURGERY

## 2024-06-04 PROCEDURE — 25000003 PHARM REV CODE 250: Performed by: SURGERY

## 2024-06-04 PROCEDURE — 27201423 OPTIME MED/SURG SUP & DEVICES STERILE SUPPLY: Performed by: SURGERY

## 2024-06-04 PROCEDURE — C1750 CATH, HEMODIALYSIS,LONG-TERM: HCPCS | Performed by: SURGERY

## 2024-06-04 PROCEDURE — 71000039 HC RECOVERY, EACH ADD'L HOUR: Performed by: SURGERY

## 2024-06-04 DEVICE — CATH CURL DLYS PERI 62CM: Type: IMPLANTABLE DEVICE | Site: ABDOMEN | Status: FUNCTIONAL

## 2024-06-04 RX ORDER — HYDROMORPHONE HYDROCHLORIDE 2 MG/ML
0.2 INJECTION, SOLUTION INTRAMUSCULAR; INTRAVENOUS; SUBCUTANEOUS EVERY 5 MIN PRN
Status: DISCONTINUED | OUTPATIENT
Start: 2024-06-04 | End: 2024-06-04 | Stop reason: HOSPADM

## 2024-06-04 RX ORDER — DEXAMETHASONE SODIUM PHOSPHATE 4 MG/ML
INJECTION, SOLUTION INTRA-ARTICULAR; INTRALESIONAL; INTRAMUSCULAR; INTRAVENOUS; SOFT TISSUE
Status: DISCONTINUED | OUTPATIENT
Start: 2024-06-04 | End: 2024-06-04

## 2024-06-04 RX ORDER — PROCHLORPERAZINE EDISYLATE 5 MG/ML
5 INJECTION INTRAMUSCULAR; INTRAVENOUS EVERY 4 HOURS PRN
Status: DISCONTINUED | OUTPATIENT
Start: 2024-06-04 | End: 2024-06-04 | Stop reason: HOSPADM

## 2024-06-04 RX ORDER — FENTANYL CITRATE 50 UG/ML
INJECTION, SOLUTION INTRAMUSCULAR; INTRAVENOUS
Status: DISCONTINUED | OUTPATIENT
Start: 2024-06-04 | End: 2024-06-04

## 2024-06-04 RX ORDER — ACETAMINOPHEN 10 MG/ML
INJECTION, SOLUTION INTRAVENOUS
Status: DISCONTINUED | OUTPATIENT
Start: 2024-06-04 | End: 2024-06-04

## 2024-06-04 RX ORDER — EPHEDRINE SULFATE 50 MG/ML
INJECTION, SOLUTION INTRAVENOUS
Status: DISCONTINUED | OUTPATIENT
Start: 2024-06-04 | End: 2024-06-04

## 2024-06-04 RX ORDER — HEPARIN SODIUM,PORCINE/PF 10 UNIT/ML
SYRINGE (ML) INTRAVENOUS
Status: DISCONTINUED | OUTPATIENT
Start: 2024-06-04 | End: 2024-06-04 | Stop reason: HOSPADM

## 2024-06-04 RX ORDER — ONDANSETRON HYDROCHLORIDE 2 MG/ML
4 INJECTION, SOLUTION INTRAVENOUS ONCE
Status: DISCONTINUED | OUTPATIENT
Start: 2024-06-04 | End: 2024-06-04 | Stop reason: HOSPADM

## 2024-06-04 RX ORDER — LIDOCAINE HYDROCHLORIDE 10 MG/ML
0.5 INJECTION, SOLUTION EPIDURAL; INFILTRATION; INTRACAUDAL; PERINEURAL ONCE
Status: DISCONTINUED | OUTPATIENT
Start: 2024-06-04 | End: 2024-06-04 | Stop reason: HOSPADM

## 2024-06-04 RX ORDER — MEPERIDINE HYDROCHLORIDE 50 MG/ML
12.5 INJECTION INTRAMUSCULAR; INTRAVENOUS; SUBCUTANEOUS ONCE
Status: DISCONTINUED | OUTPATIENT
Start: 2024-06-04 | End: 2024-06-04 | Stop reason: HOSPADM

## 2024-06-04 RX ORDER — SUCCINYLCHOLINE CHLORIDE 20 MG/ML
INJECTION INTRAMUSCULAR; INTRAVENOUS
Status: DISCONTINUED | OUTPATIENT
Start: 2024-06-04 | End: 2024-06-04

## 2024-06-04 RX ORDER — ROCURONIUM BROMIDE 10 MG/ML
INJECTION, SOLUTION INTRAVENOUS
Status: DISCONTINUED | OUTPATIENT
Start: 2024-06-04 | End: 2024-06-04

## 2024-06-04 RX ORDER — BUPIVACAINE HYDROCHLORIDE 2.5 MG/ML
INJECTION, SOLUTION EPIDURAL; INFILTRATION; INTRACAUDAL
Status: DISCONTINUED | OUTPATIENT
Start: 2024-06-04 | End: 2024-06-04 | Stop reason: HOSPADM

## 2024-06-04 RX ORDER — DIPHENHYDRAMINE HYDROCHLORIDE 50 MG/ML
25 INJECTION INTRAMUSCULAR; INTRAVENOUS EVERY 6 HOURS PRN
Status: DISCONTINUED | OUTPATIENT
Start: 2024-06-04 | End: 2024-06-04 | Stop reason: HOSPADM

## 2024-06-04 RX ORDER — SODIUM CHLORIDE, SODIUM LACTATE, POTASSIUM CHLORIDE, CALCIUM CHLORIDE 600; 310; 30; 20 MG/100ML; MG/100ML; MG/100ML; MG/100ML
INJECTION, SOLUTION INTRAVENOUS CONTINUOUS
Status: DISCONTINUED | OUTPATIENT
Start: 2024-06-04 | End: 2024-06-04 | Stop reason: HOSPADM

## 2024-06-04 RX ORDER — METOCLOPRAMIDE HYDROCHLORIDE 5 MG/ML
INJECTION INTRAMUSCULAR; INTRAVENOUS
Status: DISCONTINUED | OUTPATIENT
Start: 2024-06-04 | End: 2024-06-04

## 2024-06-04 RX ORDER — ONDANSETRON HYDROCHLORIDE 2 MG/ML
INJECTION, SOLUTION INTRAVENOUS
Status: DISCONTINUED | OUTPATIENT
Start: 2024-06-04 | End: 2024-06-04

## 2024-06-04 RX ORDER — TRAMADOL HYDROCHLORIDE 50 MG/1
50 TABLET ORAL EVERY 8 HOURS PRN
Qty: 20 TABLET | Refills: 0 | Status: SHIPPED | OUTPATIENT
Start: 2024-06-04 | End: 2024-06-11

## 2024-06-04 RX ORDER — FENTANYL CITRATE 50 UG/ML
25 INJECTION, SOLUTION INTRAMUSCULAR; INTRAVENOUS EVERY 5 MIN PRN
Status: DISCONTINUED | OUTPATIENT
Start: 2024-06-04 | End: 2024-06-04 | Stop reason: HOSPADM

## 2024-06-04 RX ADMIN — EPHEDRINE SULFATE 40 MG: 50 INJECTION, SOLUTION INTRAMUSCULAR; INTRAVENOUS; SUBCUTANEOUS at 11:06

## 2024-06-04 RX ADMIN — ACETAMINOPHEN 1000 MG: 10 INJECTION, SOLUTION INTRAVENOUS at 11:06

## 2024-06-04 RX ADMIN — SUCCINYLCHOLINE CHLORIDE 120 MG: 20 INJECTION, SOLUTION INTRAMUSCULAR; INTRAVENOUS at 11:06

## 2024-06-04 RX ADMIN — DEXAMETHASONE SODIUM PHOSPHATE 4 MG: 4 INJECTION, SOLUTION INTRA-ARTICULAR; INTRALESIONAL; INTRAMUSCULAR; INTRAVENOUS; SOFT TISSUE at 11:06

## 2024-06-04 RX ADMIN — CEFAZOLIN 2 G: 2 INJECTION, POWDER, FOR SOLUTION INTRAMUSCULAR; INTRAVENOUS at 11:06

## 2024-06-04 RX ADMIN — EPHEDRINE SULFATE 10 MG: 50 INJECTION, SOLUTION INTRAMUSCULAR; INTRAVENOUS; SUBCUTANEOUS at 11:06

## 2024-06-04 RX ADMIN — SODIUM CHLORIDE, SODIUM LACTATE, POTASSIUM CHLORIDE, AND CALCIUM CHLORIDE: .6; .31; .03; .02 INJECTION, SOLUTION INTRAVENOUS at 11:06

## 2024-06-04 RX ADMIN — ROCURONIUM BROMIDE 5 MG: 10 INJECTION, SOLUTION INTRAVENOUS at 11:06

## 2024-06-04 RX ADMIN — ROCURONIUM BROMIDE 25 MG: 10 INJECTION, SOLUTION INTRAVENOUS at 11:06

## 2024-06-04 RX ADMIN — METOCLOPRAMIDE 10 MG: 5 INJECTION, SOLUTION INTRAMUSCULAR; INTRAVENOUS at 11:06

## 2024-06-04 RX ADMIN — FENTANYL CITRATE 100 MCG: 50 INJECTION, SOLUTION INTRAMUSCULAR; INTRAVENOUS at 11:06

## 2024-06-04 RX ADMIN — SUGAMMADEX 200 MG: 100 INJECTION, SOLUTION INTRAVENOUS at 12:06

## 2024-06-04 RX ADMIN — ONDANSETRON 4 MG: 2 INJECTION INTRAMUSCULAR; INTRAVENOUS at 11:06

## 2024-06-04 NOTE — ANESTHESIA POSTPROCEDURE EVALUATION
Anesthesia Post Evaluation    Patient: Juhi Taylor    Procedure(s) Performed: Procedure(s) (LRB):  IN Insertion Catheter, Dialysis, Peritoneal - laparoscopic (N/A)    Final Anesthesia Type: general      Patient location during evaluation: PACU  Patient participation: Yes- Able to Participate  Level of consciousness: awake and alert  Post-procedure vital signs: reviewed and stable  Pain management: adequate  Airway patency: patent    PONV status at discharge: No PONV  Anesthetic complications: no      Cardiovascular status: blood pressure returned to baseline  Respiratory status: unassisted  Hydration status: euvolemic  Follow-up not needed.              Vitals Value Taken Time   /66 06/04/24 1428   Temp 36.5 °C (97.7 °F) 06/04/24 1325   Pulse 53 06/04/24 1428   Resp 20 06/04/24 1428   SpO2 96 % 06/04/24 1428         Event Time   Out of Recovery 13:59:00         Pain/Kg Score: Pain Rating Post Med Admin: 0 (6/4/2024  1:30 PM)  Kg Score: 10 (6/4/2024  1:45 PM)  Modified Kg Score: 19 (6/4/2024  2:28 PM)

## 2024-06-04 NOTE — DISCHARGE INSTRUCTIONS
General Information:    1.  Do not drink alcoholic beverages including beer for 24 hours or as long as you are on pain medication..  2.  Do not drive a motor vehicle, operate machinery or power tools, or signs legal papers for 24 hours or as long as you are on pain medication.   3.  You may experience light-headedness, dizziness, and sleepiness following surgery. Please do not stay alone. A responsible adult should be with you for this 24 hour period.  4.  Go home and rest.    5. Progress slowly to a normal diet unless instructed.  Otherwise, begin with liquids such as soft drinks, then soup and crackers working up to solid foods. Drink plenty of nonalcoholic fluids.  6.  Certain anesthetics and pain medications produce nausea and vomiting in certain       individuals. If nausea becomes a problem at home, call you doctor.    7. A nurse will be calling you sometime after surgery. Do not be alarmed. This is our way of finding out how you are doing.    8. Several times every hour while you are awake, take 2-3 deep breaths and cough. If you had stomach surgery hold a pillow or rolled towel firmly against your stomach before you cough. This will help with any pain the cough might cause.  9. Several times every hour while you are awake, pump and flex your feet 5-6 times and do foot circles. This will help prevent blood clots.    10.Call your doctor for severe pain, bleeding, fever, or signs or symptoms of infection (pain, swelling, redness, foul odor, drainage).      Post op instructions for prevention of DVT  What is deep vein thrombosis?  Deep vein thrombosis (DVT) is the medical term for blood clots in the deep veins of the leg.  These blood clots can be dangerous.  A DVT can block a blood vessel and keep blood from getting where it needs to go.  Another problem is that the clot can travel to other parts of the body such as the lungs.  A clot that travels to the lungs is called a pulmonary embolus (PE) and can cause  serious problems with breathing which can lead to death.  Am I at risk for DVT/PE?  If you are not very active, you are at risk of DVT.  Anyone confined to bed, sitting for long periods of time, recovering from surgery, etc. increases the risk of DVT.  Other risk factors are cancer diagnosis, certain medications, estrogen replacement in any form,older age, obesity, pregnancy, smoking, history of clotting disorders, and dehydration.  How will I know if I have a DVT?  Swelling in the lower leg  Pain  Warmth, redness, hardness or bulging of the vein  If you have any of these symptoms, call your doctors office right away.  Some people will not have any symptoms until the clot moves to the lungs.  What are the symptoms of a PE?  Panting, shortness of breath, or trouble breathing  Sharp, knife-like chest pain when you breathe  Coughing or coughing up blood  Rapid heartbeat  If you have any of these symptoms or get worse quickly, call 911 for emergency treatment.  How can I prevent a DVT?  Avoid long periods of inactivity and dont cross your legs--get up and walk around every hour or so.  Stay active--walking after surgery is highly encouraged.  This means you should get out of the house and walk in the neighborhood.  Going up and down stairs will not impair healing (unless advised against such activity by your doctor).    Drink plenty of noncaffeinated, nonalcoholic fluids each day to prevent dehydration.  Wear special support stockings, if they have been advised by your doctor.  If you travel, stop at least once an hour and walk around.  Avoid smoking (assistance with stopping is available through your healthcare provider)  Always notify your doctor if you are not able to follow the post operative instructions that are given to you at the time of discharge.  It may be necessary to prescribe one of the medications available to prevent DVT.      Using an Incentive Spirometer    An incentive spirometer is a device that  helps you do deep breathing exercises. These exercises expand your lungs, aid in circulation, and help prevent pneumonia. Deep breathing exercises also help you breathe better and improve the function of your lungs by:  Keeping your lungs clear  Strengthening your breathing muscles  Helping prevent respiratory complications or problems  The incentive spirometer gives you a way to take an active part in recover. A nurse or therapist will teach you breathing exercises. To do these exercises, you will breathe in through your mouth and not your nose. The incentive spirometer only works correctly if you breathe in through your mouth.  Steps to clear lungs  Step 1. Exhale normally. Then, inhale normally.  Relax and breathe out.  Step 2. Place your lips tightly around the mouthpiece.  Make sure the device is upright and not tilted.  Step 3. Inhale as much air as you can through the mouthpiece (don't breath through your nose).  Inhale slowly and deeply.  Hold your breath long enough to keep the balls or disk raised for at least 3 to 5 seconds, or as instructed by your healthcare provider.  Some spirometers have an indicator to let you know that you are breathing in too fast. If the indicator goes off, breathe in more slowly.  Step 4. Repeat the exercise regularly.  Do this exercise every hour while you're awake, or as instructed by your healthcare provider.  If you were taught deep breathing and coughing exercises, do them regularly as instructed by your healthcare provider.       We hope your stay was comfortable as you heal now, mend and rest.    For we have enjoyed taking care of you by giving your our best.    And as you get better, by regaining your health and strength;   We count it as a privilege to have served you and hope your time at [facility] was well spent.      Thank  You!!!

## 2024-06-04 NOTE — ANESTHESIA PROCEDURE NOTES
Intubation    Date/Time: 6/4/2024 11:21 AM    Performed by: Pat Fletcher CRNA  Authorized by: Pat Fletcher CRNA    Intubation:     Induction:  Intravenous    Intubated:  Postinduction    Mask Ventilation:  Easy mask    Attempts:  1    Attempted By:  CRNA    Method of Intubation:  Video laryngoscopy    Blade:  Glidescope 3    Laryngeal View Grade: Grade I - full view of cords      Difficult Airway Encountered?: No      Complications:  None    Airway Device:  Oral endotracheal tube    Airway Device Size:  7.0    Style/Cuff Inflation:  Cuffed    Inflation Amount (mL):  8    Tube secured:  21    Secured at:  The teeth    Placement Verified By:  Capnometry    Complicating Factors:  None    Findings Post-Intubation:  BS equal bilateral and atraumatic/condition of teeth unchanged

## 2024-06-04 NOTE — TRANSFER OF CARE
"Anesthesia Transfer of Care Note    Patient: Juhi Taylor    Procedure(s) Performed: Procedure(s) (LRB):  IN Insertion Catheter, Dialysis, Peritoneal - laparoscopic (N/A)    Patient location: PACU    Anesthesia Type: general    Transport from OR: Transported from OR on 6-10 L/min O2 by face mask with adequate spontaneous ventilation    Post pain: adequate analgesia    Post assessment: no apparent anesthetic complications    Post vital signs: stable    Level of consciousness: awake and alert    Nausea/Vomiting: no nausea/vomiting    Complications: none    Transfer of care protocol was followed      Last vitals: Visit Vitals  BP (!) 162/88 (BP Location: Right arm, Patient Position: Lying)   Pulse 60   Temp 36.4 °C (97.5 °F) (Skin)   Resp 20   Ht 5' 7" (1.702 m)   Wt 134.3 kg (296 lb)   SpO2 96%   Breastfeeding No   BMI 46.36 kg/m²     "

## 2024-06-04 NOTE — PLAN OF CARE
Pt prepared for surgery. Pt resting in bed, with family/friend at bedside. Family signed up for text messaging. Belongings brought over to post op cabinet. IS teaching performed. Fall risk agreement reviewed and armband placed. Allergy and limb alert armband placed. SCDs and heel cushions on.

## 2024-06-04 NOTE — ANESTHESIA PREPROCEDURE EVALUATION
06/04/2024  Juhi Taylor is a 73 y.o., female.      Pre-op Assessment    I have reviewed the Patient Summary Reports.     I have reviewed the Nursing Notes. I have reviewed the NPO Status.   I have reviewed the Medications.     Review of Systems  Anesthesia Hx:             Denies Family Hx of Anesthesia complications.    Denies Personal Hx of Anesthesia complications.                    Cardiovascular:     Hypertension, poorly controlled   CAD   CABG/stent    CHF       ECG has been reviewed. CHF decompensated with peripheral and pulmonary edema, EF 40% < Gr. I DD, LBBB                         Pulmonary:      Shortness of breath  Sleep Apnea, CPAP                Renal/:  Chronic Renal Disease, ESRD                Hepatic/GI:     GERD             Musculoskeletal:  Arthritis               Endocrine:  Diabetes, poorly controlled, type 2 Hypothyroidism        Morbid Obesity / BMI > 40  Psych:  Psychiatric History anxiety depression                Physical Exam  General: Cooperative, Alert, Oriented and Anxious    Airway:  Mallampati: IV / III  Mouth Opening: Normal  TM Distance: 4 - 6 cm  Tongue: Large  Neck ROM: Extension Decreased  Neck: Girth Increased    Dental:  2 teeth remain  Chest/Lungs:  Tachypnea  Decreased Breath Sounds: left and right    Heart:  Rate: Normal  Rhythm: Regular Rhythm        Anesthesia Plan  Type of Anesthesia, risks & benefits discussed:    Anesthesia Type: Gen ETT  Intra-op Monitoring Plan: Standard ASA Monitors  Post Op Pain Control Plan: multimodal analgesia  Induction:  IV  Airway Plan: Video  Informed Consent: Informed consent signed with the Patient and all parties understand the risks and agree with anesthesia plan.  All questions answered.   ASA Score: 4  Anesthesia Plan Notes: Patient's severe high risk condition discussed with herself and family member and possible  need for postoperative ventilation and ICU care. They wish to proceed.    Ready For Surgery From Anesthesia Perspective.     .

## 2024-06-04 NOTE — OP NOTE
DATE OF PROCEDURE: 06/04/2024    PREOPERATIVE DIAGNOSIS: Chronic kidney disease 5    POSTOPERATIVE DIAGNOSIS: Same    PROCEDURE: Laparoscopic peritoneal dialysis catheter placement    SURGEON: Rodriguez Catalan M.D    ASSISTANT: Sue JOHNS    ANESTHESIA: General    ESTIMATED BLOOD LOSS: Minimal    SPECIMEN: None    CONDITION: Stable    COMPLICATIONS: None    FINDINGS:   1. Coiled tip peritoneal dialysis catheter positioned in the pelvis under laparoscopic guidance  2. Easily infused and drained normal saline     INDICATIONS: The patient is a 73-year-old female who is progressing to end-stage renal disease. Is wanting to pursue peritoneal dialysis. Counseled on options agreed proceed with laparoscopic placement.    PROCEDURE IN DETAIL: Patient was taken the operating room placed in supine position where general endotracheal intubation was achieved.  Her abdomen was prepped and draped typical sterile fashion.  Time-out performed by members of the operative team.  She did received perioperative antibiotics. A Veress needle was inserted at cabrera's point attached insufflation.  We initially had high pressures and this was reinserted and we had appropriate pressures and pneumoperitoneum was achieved. Local anesthetic was injected and a stab incision was made at the Veress insertion site.  A 5 mm Optiview trocar was inserted without issue.  There was no obvious injury.  Patient did have midline omental adhesions from previous surgery. Patient was placed in slight Trendelenburg position. The lower pelvis appeared to be free of any significant omental burden. Just to the left of the midline in the lower pelvis the site was marked and local anesthetic was injected. A 5 mm trocar was inserted in oblique angle through the rectus muscle and into the lower pelvis. We were able to pass our coil tip peritoneal dialysis catheter through this trocar and positioned it in the lower pelvis. The distal cuff was positioned  within the muscle. And exit site was then chosen in the left mid lateral abdomen. Local was injected and a stab incision was made. A Sonia clamp was then passed to create a 90 degree curve and the catheter was grasped and pulled through the subcutaneous tract. The abdomen was desufflated pneumoperitoneum the catheter was connected to a 500 cc bag and normal saline using cysto tubing. We then infused proximally 400 cc of fluid and then dropped the bag.  It easily drained the all of the fluid.  The abdomen was reinsufflated. The catheter was tacked down to the pelvis with a Domenico-Sulaiman passing device in his 0 Vicryl suture to help keep it in appropriate position. We then desufflated the abdomen pneumoperitoneum and removed our remaining trocar.  All skin incisions were closed with Monocryl subcuticular stitches and Dermabond. Catheter exit site had a Biopatch and Tegaderm placed. Patient was aroused from sedation, extubated taken to recovery room in stable condition having suffered no complications.  All counts were correct x2 the case.  I was present scrubbed throughout all operative portions of the case.    DISPO: PACU

## 2024-06-05 VITALS
WEIGHT: 293 LBS | BODY MASS INDEX: 45.99 KG/M2 | HEART RATE: 53 BPM | TEMPERATURE: 98 F | HEIGHT: 67 IN | RESPIRATION RATE: 20 BRPM | OXYGEN SATURATION: 96 % | SYSTOLIC BLOOD PRESSURE: 140 MMHG | DIASTOLIC BLOOD PRESSURE: 66 MMHG

## 2024-06-05 NOTE — PROGRESS NOTES
6/5/24 She Stated very pleased with care given.  All staff were kind and helpful. Pt. and family member states they have read and understand all discharge instructions. She is having heavy serosanguinous drainage from the op site.  Soaking 2-3 pads.  Call placed to MD office for instructions with op site.

## 2024-06-06 NOTE — DISCHARGE SUMMARY
[facility]  Discharge Note  Short Stay    Procedure(s) (LRB):  IN Insertion Catheter, Dialysis, Peritoneal - laparoscopic (N/A)      OUTCOME: Patient tolerated treatment/procedure well without complication and is now ready for discharge.    DISPOSITION: Home or Self Care    FINAL DIAGNOSIS:  <principal problem not specified>    FOLLOWUP: In clinic    DISCHARGE INSTRUCTIONS:    Discharge Procedure Orders   Diet Adult Regular     Ice to affected area     Lifting restrictions   Order Comments: Please avoid lifting greater than 40 lb, straining, strenuous activity for two weeks.     Change dressing (specify)   Order Comments: Post-Operative Wound Care    At your catheter exit site there is a sterile bandage.  Please leave it in place for 5-7 days.  You may carefully shower around it.    A surgical glue has been placed over your incisions, please leave the glue in place and do not attempt to remove it.  It is ok to shower using mild soap and water over the incisions the day after your procedure. Pat dry your incisions. Do not soak in a bathtub or other body of water.     If you noticed redness, swelling, fever, increasing pain or significant drainage from your wound please call/message the office or the 24 hr nurse hotline after hours.     Notify your health care provider if you experience any of the following:  temperature >100.4     Notify your health care provider if you experience any of the following:  persistent nausea and vomiting or diarrhea     Notify your health care provider if you experience any of the following:  severe uncontrolled pain     Notify your health care provider if you experience any of the following:  redness, tenderness, or signs of infection (pain, swelling, redness, odor or green/yellow discharge around incision site)     Notify your health care provider if you experience any of the following:  worsening rash     Notify your health care provider if you experience  any of the following:  increased confusion or weakness     Shower on day dressing removed (No bath)        TIME SPENT ON DISCHARGE: 10 minutes

## 2024-06-12 ENCOUNTER — OFFICE VISIT (OUTPATIENT)
Dept: SURGERY | Facility: CLINIC | Age: 74
End: 2024-06-12
Payer: MEDICARE

## 2024-06-12 VITALS — TEMPERATURE: 98 F | SYSTOLIC BLOOD PRESSURE: 162 MMHG | DIASTOLIC BLOOD PRESSURE: 90 MMHG | HEART RATE: 62 BPM

## 2024-06-12 DIAGNOSIS — N18.5 CHRONIC KIDNEY DISEASE (CKD), STAGE V: Primary | ICD-10-CM

## 2024-06-12 DIAGNOSIS — Z99.2 PERITONEAL DIALYSIS CATHETER IN PLACE: ICD-10-CM

## 2024-06-12 PROCEDURE — 99212 OFFICE O/P EST SF 10 MIN: CPT | Mod: PBBFAC,PN | Performed by: SURGERY

## 2024-06-12 PROCEDURE — 99999 PR PBB SHADOW E&M-EST. PATIENT-LVL II: CPT | Mod: PBBFAC,,, | Performed by: SURGERY

## 2024-06-14 NOTE — PROGRESS NOTES
GENERAL SURGERY  POST-OP PROGRESS NOTE    HPI: Juhi Taylor is a 73 y.o. female status post laparoscopic peritoneal dialysis catheter placement for anticipated initiation of peritoneal dialysis. Patient seems to have significant fluid overload with swelling of the legs, abdominal wall edema and dyspnea on exertion. Has been in contact with dialysis and hopefully will get started on peritoneal dialysis in the next week.  Has had persistent serous drainage from around the peritoneal dialysis tube site since surgery. Denies severe abdominal pain or purulent drainage. No erythema or redness of the skin.  VITALS:  Vitals:    06/12/24 1030   BP: (!) 162/90   Pulse: 62   Temp: 98 °F (36.7 °C)       PHYSICAL EXAM:  Abdominal wall with dependent edema. Tube exit site draining serous fluid.  No surrounding erythema, fluctuance or obvious signs of infection. Other laparoscopic in catheter insertion sites well healed without signs of breakdown or infection.      ASSESSMENT & PLAN:  73 y.o. female s/p placement of peritoneal dialysis catheter with ongoing drainage around catheter  -suspect drainage is either ascitic fluid from fluid overload status in the abdominal cavity or from 3rd spacing fluid in the abdominal wall edema   -recommended attaching PD catheter to gravity drainage and provided bile bag to allow for this, this may alleviate some of the intra-abdominal ascites if this has a causative agent   -overall I think the best way to improve her symptoms get her on dialysis and improve her fluid overload status   -hopefully she will get in with her PD nurse next week

## 2024-06-18 ENCOUNTER — HOSPITAL ENCOUNTER (INPATIENT)
Facility: HOSPITAL | Age: 74
LOS: 14 days | Discharge: ANOTHER HOSPITAL | DRG: 291 | End: 2024-07-03
Attending: EMERGENCY MEDICINE | Admitting: FAMILY MEDICINE
Payer: MEDICARE

## 2024-06-18 DIAGNOSIS — I50.9 CONGESTIVE HEART FAILURE, UNSPECIFIED HF CHRONICITY, UNSPECIFIED HEART FAILURE TYPE: ICD-10-CM

## 2024-06-18 DIAGNOSIS — R06.02 SHORTNESS OF BREATH: ICD-10-CM

## 2024-06-18 DIAGNOSIS — I48.91 ATRIAL FIBRILLATION AND FLUTTER: ICD-10-CM

## 2024-06-18 DIAGNOSIS — I46.9 CARDIAC ARREST: Primary | ICD-10-CM

## 2024-06-18 DIAGNOSIS — R00.1 BRADYCARDIA: ICD-10-CM

## 2024-06-18 DIAGNOSIS — E87.70 VOLUME OVERLOAD: ICD-10-CM

## 2024-06-18 DIAGNOSIS — N18.5 CHRONIC KIDNEY DISEASE (CKD), STAGE V: ICD-10-CM

## 2024-06-18 DIAGNOSIS — J96.01 ACUTE HYPOXIC RESPIRATORY FAILURE: ICD-10-CM

## 2024-06-18 DIAGNOSIS — I48.92 ATRIAL FIBRILLATION AND FLUTTER: ICD-10-CM

## 2024-06-18 DIAGNOSIS — N18.6 ESRD (END STAGE RENAL DISEASE): ICD-10-CM

## 2024-06-18 DIAGNOSIS — E87.70 HYPERVOLEMIA, UNSPECIFIED HYPERVOLEMIA TYPE: ICD-10-CM

## 2024-06-18 DIAGNOSIS — E87.70 FLUID OVERLOAD: ICD-10-CM

## 2024-06-18 DIAGNOSIS — R07.9 CHEST PAIN: ICD-10-CM

## 2024-06-18 DIAGNOSIS — R07.89 CHEST DISCOMFORT: ICD-10-CM

## 2024-06-18 PROBLEM — E66.01 MORBID OBESITY: Status: ACTIVE | Noted: 2024-06-18

## 2024-06-18 LAB
ANION GAP SERPL CALC-SCNC: 7 MMOL/L (ref 8–16)
BASOPHILS # BLD AUTO: 0.03 K/UL (ref 0–0.2)
BASOPHILS NFR BLD: 0.3 % (ref 0–1.9)
BUN SERPL-MCNC: 93 MG/DL (ref 8–23)
CALCIUM SERPL-MCNC: 9.6 MG/DL (ref 8.7–10.5)
CHLORIDE SERPL-SCNC: 106 MMOL/L (ref 95–110)
CO2 SERPL-SCNC: 26 MMOL/L (ref 23–29)
CREAT SERPL-MCNC: 4.1 MG/DL (ref 0.5–1.4)
DIFFERENTIAL METHOD BLD: ABNORMAL
EOSINOPHIL # BLD AUTO: 0.2 K/UL (ref 0–0.5)
EOSINOPHIL NFR BLD: 1.6 % (ref 0–8)
ERYTHROCYTE [DISTWIDTH] IN BLOOD BY AUTOMATED COUNT: 19.1 % (ref 11.5–14.5)
EST. GFR  (NO RACE VARIABLE): 11.1 ML/MIN/1.73 M^2
GLUCOSE SERPL-MCNC: 197 MG/DL (ref 70–110)
HCT VFR BLD AUTO: 42.7 % (ref 37–48.5)
HGB BLD-MCNC: 12.5 G/DL (ref 12–16)
IMM GRANULOCYTES # BLD AUTO: 0.03 K/UL (ref 0–0.04)
IMM GRANULOCYTES NFR BLD AUTO: 0.3 % (ref 0–0.5)
LYMPHOCYTES # BLD AUTO: 1.5 K/UL (ref 1–4.8)
LYMPHOCYTES NFR BLD: 15.4 % (ref 18–48)
MCH RBC QN AUTO: 25.1 PG (ref 27–31)
MCHC RBC AUTO-ENTMCNC: 29.3 G/DL (ref 32–36)
MCV RBC AUTO: 86 FL (ref 82–98)
MONOCYTES # BLD AUTO: 0.8 K/UL (ref 0.3–1)
MONOCYTES NFR BLD: 8.4 % (ref 4–15)
NEUTROPHILS # BLD AUTO: 7.4 K/UL (ref 1.8–7.7)
NEUTROPHILS NFR BLD: 74 % (ref 38–73)
NRBC BLD-RTO: 0 /100 WBC
OHS QRS DURATION: 146 MS
OHS QTC CALCULATION: 447 MS
PLATELET # BLD AUTO: 249 K/UL (ref 150–450)
PMV BLD AUTO: 10.4 FL (ref 9.2–12.9)
POTASSIUM SERPL-SCNC: 4.3 MMOL/L (ref 3.5–5.1)
RBC # BLD AUTO: 4.98 M/UL (ref 4–5.4)
SODIUM SERPL-SCNC: 139 MMOL/L (ref 136–145)
TROPONIN I SERPL HS-MCNC: 46.9 PG/ML (ref 0–14.9)
WBC # BLD AUTO: 10.03 K/UL (ref 3.9–12.7)

## 2024-06-18 PROCEDURE — 05PYX3Z REMOVAL OF INFUSION DEVICE FROM UPPER VEIN, EXTERNAL APPROACH: ICD-10-PCS | Performed by: SURGERY

## 2024-06-18 PROCEDURE — 85025 COMPLETE CBC W/AUTO DIFF WBC: CPT | Performed by: EMERGENCY MEDICINE

## 2024-06-18 PROCEDURE — 84484 ASSAY OF TROPONIN QUANT: CPT | Performed by: EMERGENCY MEDICINE

## 2024-06-18 PROCEDURE — 63600175 PHARM REV CODE 636 W HCPCS: Performed by: EMERGENCY MEDICINE

## 2024-06-18 PROCEDURE — 93005 ELECTROCARDIOGRAM TRACING: CPT | Performed by: GENERAL PRACTICE

## 2024-06-18 PROCEDURE — 51702 INSERT TEMP BLADDER CATH: CPT

## 2024-06-18 PROCEDURE — G0378 HOSPITAL OBSERVATION PER HR: HCPCS

## 2024-06-18 PROCEDURE — 93010 ELECTROCARDIOGRAM REPORT: CPT | Mod: ,,, | Performed by: GENERAL PRACTICE

## 2024-06-18 PROCEDURE — 63600175 PHARM REV CODE 636 W HCPCS: Performed by: INTERNAL MEDICINE

## 2024-06-18 PROCEDURE — 25000003 PHARM REV CODE 250: Performed by: EMERGENCY MEDICINE

## 2024-06-18 PROCEDURE — 96372 THER/PROPH/DIAG INJ SC/IM: CPT | Performed by: INTERNAL MEDICINE

## 2024-06-18 PROCEDURE — 80048 BASIC METABOLIC PNL TOTAL CA: CPT | Performed by: EMERGENCY MEDICINE

## 2024-06-18 PROCEDURE — 96376 TX/PRO/DX INJ SAME DRUG ADON: CPT

## 2024-06-18 PROCEDURE — 25000003 PHARM REV CODE 250: Performed by: INTERNAL MEDICINE

## 2024-06-18 PROCEDURE — 99285 EMERGENCY DEPT VISIT HI MDM: CPT | Mod: 25

## 2024-06-18 PROCEDURE — 96375 TX/PRO/DX INJ NEW DRUG ADDON: CPT

## 2024-06-18 RX ORDER — AMLODIPINE BESYLATE 5 MG/1
10 TABLET ORAL DAILY
Status: DISCONTINUED | OUTPATIENT
Start: 2024-06-19 | End: 2024-06-19

## 2024-06-18 RX ORDER — ALUMINUM HYDROXIDE, MAGNESIUM HYDROXIDE, AND SIMETHICONE 1200; 120; 1200 MG/30ML; MG/30ML; MG/30ML
30 SUSPENSION ORAL 4 TIMES DAILY PRN
Status: DISCONTINUED | OUTPATIENT
Start: 2024-06-18 | End: 2024-07-03 | Stop reason: HOSPADM

## 2024-06-18 RX ORDER — FAMOTIDINE 20 MG/1
20 TABLET, FILM COATED ORAL DAILY
Status: DISCONTINUED | OUTPATIENT
Start: 2024-06-19 | End: 2024-07-03 | Stop reason: HOSPADM

## 2024-06-18 RX ORDER — TALC
6 POWDER (GRAM) TOPICAL NIGHTLY PRN
Status: DISCONTINUED | OUTPATIENT
Start: 2024-06-18 | End: 2024-07-03 | Stop reason: HOSPADM

## 2024-06-18 RX ORDER — ONDANSETRON HYDROCHLORIDE 2 MG/ML
4 INJECTION, SOLUTION INTRAVENOUS EVERY 6 HOURS PRN
Status: DISCONTINUED | OUTPATIENT
Start: 2024-06-18 | End: 2024-07-03 | Stop reason: HOSPADM

## 2024-06-18 RX ORDER — TRAMADOL HYDROCHLORIDE 50 MG/1
50 TABLET ORAL EVERY 6 HOURS PRN
COMMUNITY
End: 2024-06-18

## 2024-06-18 RX ORDER — FUROSEMIDE 10 MG/ML
60 INJECTION INTRAMUSCULAR; INTRAVENOUS
Status: COMPLETED | OUTPATIENT
Start: 2024-06-18 | End: 2024-06-18

## 2024-06-18 RX ORDER — HYDROCODONE BITARTRATE AND ACETAMINOPHEN 5; 325 MG/1; MG/1
1 TABLET ORAL EVERY 6 HOURS PRN
Status: DISCONTINUED | OUTPATIENT
Start: 2024-06-18 | End: 2024-06-25

## 2024-06-18 RX ORDER — CLONIDINE 0.1 MG/24H
1 PATCH, EXTENDED RELEASE TRANSDERMAL
Status: DISCONTINUED | OUTPATIENT
Start: 2024-06-19 | End: 2024-06-20

## 2024-06-18 RX ORDER — INSULIN ASPART 100 [IU]/ML
0-10 INJECTION, SOLUTION INTRAVENOUS; SUBCUTANEOUS
Status: DISCONTINUED | OUTPATIENT
Start: 2024-06-18 | End: 2024-06-25

## 2024-06-18 RX ORDER — ENOXAPARIN SODIUM 150 MG/ML
1 INJECTION SUBCUTANEOUS
Status: DISCONTINUED | OUTPATIENT
Start: 2024-06-18 | End: 2024-06-18

## 2024-06-18 RX ORDER — GLUCAGON 1 MG
1 KIT INJECTION
Status: DISCONTINUED | OUTPATIENT
Start: 2024-06-18 | End: 2024-07-03 | Stop reason: HOSPADM

## 2024-06-18 RX ORDER — ACETAMINOPHEN 325 MG/1
650 TABLET ORAL EVERY 8 HOURS PRN
Status: DISCONTINUED | OUTPATIENT
Start: 2024-06-18 | End: 2024-07-03 | Stop reason: HOSPADM

## 2024-06-18 RX ORDER — IBUPROFEN 200 MG
24 TABLET ORAL
Status: DISCONTINUED | OUTPATIENT
Start: 2024-06-18 | End: 2024-07-03 | Stop reason: HOSPADM

## 2024-06-18 RX ORDER — IBUPROFEN 200 MG
16 TABLET ORAL
Status: DISCONTINUED | OUTPATIENT
Start: 2024-06-18 | End: 2024-06-28

## 2024-06-18 RX ORDER — ENOXAPARIN SODIUM 150 MG/ML
1 INJECTION SUBCUTANEOUS EVERY 24 HOURS
Status: DISCONTINUED | OUTPATIENT
Start: 2024-06-18 | End: 2024-06-22

## 2024-06-18 RX ORDER — FUROSEMIDE 40 MG/1
40 TABLET ORAL DAILY
Status: ON HOLD | COMMUNITY
Start: 2024-06-17

## 2024-06-18 RX ORDER — ENOXAPARIN SODIUM 150 MG/ML
1 INJECTION SUBCUTANEOUS EVERY 24 HOURS
Status: DISCONTINUED | OUTPATIENT
Start: 2024-06-18 | End: 2024-06-18

## 2024-06-18 RX ORDER — CARVEDILOL 25 MG/1
25 TABLET ORAL 2 TIMES DAILY
Status: DISCONTINUED | OUTPATIENT
Start: 2024-06-18 | End: 2024-06-23

## 2024-06-18 RX ADMIN — ENOXAPARIN SODIUM 135 MG: 150 INJECTION SUBCUTANEOUS at 09:06

## 2024-06-18 RX ADMIN — FUROSEMIDE 10 MG/HR: 10 INJECTION, SOLUTION INTRAMUSCULAR; INTRAVENOUS at 02:06

## 2024-06-18 RX ADMIN — FUROSEMIDE 60 MG: 10 INJECTION, SOLUTION INTRAMUSCULAR; INTRAVENOUS at 01:06

## 2024-06-18 RX ADMIN — CARVEDILOL 25 MG: 25 TABLET, FILM COATED ORAL at 10:06

## 2024-06-18 NOTE — ASSESSMENT & PLAN NOTE
Chronic, controlled. Latest blood pressure and vitals reviewed-     Temp:  [98.6 °F (37 °C)]   Pulse:  [104-106]   Resp:  [20-22]   BP: (138-162)/()   SpO2:  [89 %-97 %] .   Home meds for hypertension were reviewed and noted below.   Hypertension Medications               carvediloL (COREG) 25 MG tablet Take 1 tablet (25 mg total) by mouth 2 (two) times daily.    cloNIDine (CATAPRES) 0.1 MG tablet Take 1 tablet (0.1 mg total) by mouth 3 (three) times daily as needed (PRN SBP > 165 mmHg).    cloNIDine 0.1 mg/24 hr td ptwk (CATAPRES) 0.1 mg/24 hr Place 1 patch onto the skin every 7 days.    furosemide (LASIX) 40 MG tablet Take 40 mg by mouth once daily.    amLODIPine (NORVASC) 10 MG tablet Take 10 mg by mouth once daily.    nitroGLYCERIN (NITROSTAT) 0.4 MG SL tablet Place 1 tablet (0.4 mg total) under the tongue every 5 (five) minutes as needed for Chest pain.            While in the hospital, will manage blood pressure as follows; Continue home antihypertensive regimen    Will utilize p.r.n. blood pressure medication only if patient's blood pressure greater than 140/90 and she develops symptoms such as worsening chest pain or shortness of breath.

## 2024-06-18 NOTE — ASSESSMENT & PLAN NOTE
Creatine stable for now. BMP reviewed- noted Estimated Creatinine Clearance: 17.9 mL/min (A) (based on SCr of 4.1 mg/dL (H)). according to latest data. Based on current GFR, CKD stage is end stage.  Monitor UOP and serial BMP and adjust therapy as needed. Renally dose meds. Avoid nephrotoxic medications and procedures.

## 2024-06-18 NOTE — H&P
[facility] - Emergency Dept  Hospital Medicine  History & Physical    Patient Name: Juhi Taylor  MRN: [id number]  Patient Class: OP- Observation  Admission Date: 6/18/2024  Attending Physician: Baldev Hoover MD   Primary Care Provider: Tony Sadler MD         Patient information was obtained from patient, past medical records, and ER records.     Subjective:     Principal Problem:Fluid overload    Chief Complaint:   Chief Complaint   Patient presents with    Shortness of Breath     SOB x 2 weeks since surgery. 89 % on RA        HPI: 73 year old pt getting admitted with fluid overload in need of dialysis  Pt had PD catheter insertion 2 weeks ago  She was supposed to start on PD for ESRD but got delayed due to insurance issues  She has oliguria/increased weight gain in past 2 weeks  Also SOB on exertion and cough  Symptoms got worse and she came to ER and got admitted  Pt was started on lasix gtt iv         Past Medical History:   Diagnosis Date    Anticoagulant long-term use     Arthritis     Breast cancer 2014    invasive lobular carcinoma    Cancer of kidney 11/2020    RIGHT KIDNEY CANCER    CHF (congestive heart failure)     Coronary artery disease dx 2005    Depression     Diabetes mellitus     Diastolic heart failure secondary to hypertension     Gout     Hyperlipemia     Hypertension     Hypertrophy of nasal turbinates     Kidney mass 2020    Right    Levoscoliosis     Lung nodule     left    Multiple thyroid nodules     NICOLE (obstructive sleep apnea)     uses C-PAP    Pulmonary hypertension        Past Surgical History:   Procedure Laterality Date    AORTOGRAPHY N/A 12/04/2020    Procedure: Aortogram;  Surgeon: Paul Pedersen MD;  Location: [facility];  Service: Cardiology;  Laterality: N/A;    BREAST SURGERY      CARDIAC CATHETERIZATION  12/2020    CHOLECYSTECTOMY      COLONOSCOPY      multi -last 2014     CORONARY ARTERY BYPASS GRAFT      ESOPHAGOGASTRODUODENOSCOPY      2012   "   HAND SURGERY Right     INSERTION, CATHETER, DIALYSIS, PERITONEAL N/A 6/4/2024    Procedure: IN Insertion Catheter, Dialysis, Peritoneal - laparoscopic;  Surgeon: Rodriguez Catalan Jr., MD;  Location: [facility] OR;  Service: General;  Laterality: N/A;    LAPAROSCOPIC ROBOT-ASSISTED SURGICAL REMOVAL OF KIDNEY USING DA CHELLE XI Right 03/10/2022    Procedure: XI ROBOTIC NEPHRECTOMY- radical;  Surgeon: Rolando Ramirez MD;  Location: [facility] OR;  Service: Urology;  Laterality: Right;    MASTECTOMY W/ SENTINEL NODE BIOPSY Bilateral 01/21/2015    bilateral "dog ears"    NASAL SINUS SURGERY  2015    Dr Bryant FESS/cauterization turbinate     PARTIAL HYSTERECTOMY  1989    PERCUTANEOUS TRANSLUMINAL BALLOON ANGIOPLASTY OF CORONARY ARTERY  12/04/2020    Procedure: Angioplasty-coronary;  Surgeon: Paul Pedersen MD;  Location: [facility] CATH;  Service: Cardiology;;    RENAL BIOPSY Right     9/20/2021 EJ    TUBAL LIGATION      ULTRASOUND GUIDANCE  12/04/2020    Procedure: Ultrasound Guidance;  Surgeon: Paul Pedersen MD;  Location: [facility] CATH;  Service: Cardiology;;       Review of patient's allergies indicates:   Allergen Reactions    Percocet [oxycodone-acetaminophen] Itching    Amiodarone analogues      Itching      Irbesartan Swelling    Januvia [sitagliptin]     Jardiance [empagliflozin]      Leg cramps    Lipitor [atorvastatin] Other (See Comments)     Severe leg pain    Linaclotide Other (See Comments) and Nausea And Vomiting     Does not remember    Lubiprostone Other (See Comments) and Palpitations     Does not remember       No current facility-administered medications on file prior to encounter.     Current Outpatient Medications on File Prior to Encounter   Medication Sig    allopurinoL (ZYLOPRIM) 300 MG tablet Take 1 tablet (300 mg total) by mouth once daily.    apixaban (ELIQUIS) 5 mg Tab Take 1 tablet (5 mg total) by mouth 2 (two) times daily.    calcitRIOL (ROCALTROL) 0.5 MCG Cap Take 0.5 mcg by mouth once daily.    " carvediloL (COREG) 25 MG tablet Take 1 tablet (25 mg total) by mouth 2 (two) times daily.    cloNIDine (CATAPRES) 0.1 MG tablet Take 1 tablet (0.1 mg total) by mouth 3 (three) times daily as needed (PRN SBP > 165 mmHg).    cloNIDine 0.1 mg/24 hr td ptwk (CATAPRES) 0.1 mg/24 hr Place 1 patch onto the skin every 7 days.    cyanocobalamin (VITAMIN B-12) 100 MCG tablet Take 100 mcg by mouth once daily.    evolocumab (REPATHA SURECLICK) 140 mg/mL PnIj Inject 1 mL (140 mg total) into the skin every 14 (fourteen) days.    furosemide (LASIX) 40 MG tablet Take 40 mg by mouth once daily.    loratadine (CLARITIN) 10 mg tablet Take 10 mg by mouth once daily.    magnesium oxide (MAG-OX) 400 mg (241.3 mg magnesium) tablet Take 1 tablet (400 mg total) by mouth daily as needed (cramping).    amLODIPine (NORVASC) 10 MG tablet Take 10 mg by mouth once daily.    blood sugar diagnostic (TRUE METRIX GLUCOSE TEST STRIP) Strp Use 1x daily. Insurance preferred.    blood sugar diagnostic Strp To check BG 1 time daily, to use with insurance preferred meter    lancets Misc To check BG 1 times daily, to use with insurance preferred meter    nitroGLYCERIN (NITROSTAT) 0.4 MG SL tablet Place 1 tablet (0.4 mg total) under the tongue every 5 (five) minutes as needed for Chest pain.    [DISCONTINUED] aspirin (ECOTRIN) 81 MG EC tablet Take 1 tablet (81 mg total) by mouth once daily.    [DISCONTINUED] DULoxetine (CYMBALTA) 20 MG capsule TAKE ONE CAPSULE BY MOUTH ONCE DAILY    [DISCONTINUED] folic acid (FOLVITE) 1 MG tablet Take 1 mg by mouth once daily.    [DISCONTINUED] metFORMIN (GLUCOPHAGE-XR) 500 MG ER 24hr tablet Take 2 tablets (1,000 mg total) by mouth 2 (two) times daily with meals.    [DISCONTINUED] sodium bicarbonate 650 MG tablet Take 1 tablet (650 mg total) by mouth once daily.    [DISCONTINUED] torsemide (DEMADEX) 20 MG Tab Take 1 tablet (20 mg total) by mouth once daily.    [DISCONTINUED] traMADoL (ULTRAM) 50 mg tablet Take 50 mg by mouth  every 6 (six) hours as needed for Pain.     Family History       Problem Relation (Age of Onset)    Asthma Daughter    Birth defects Daughter    Breast cancer Mother, Maternal Aunt    Depression Daughter    Drug abuse Daughter, Daughter    Glaucoma Sister    Hepatitis Brother    Hypertension Father    Learning disabilities Daughter    Mental illness Daughter    Stroke Father          Tobacco Use    Smoking status: Former     Current packs/day: 0.00     Types: Cigarettes     Start date: 2016     Quit date: 2016     Years since quittin.4    Smokeless tobacco: Never    Tobacco comments:     quit    Substance and Sexual Activity    Alcohol use: No    Drug use: No    Sexual activity: Not Currently     Partners: Male     Birth control/protection: None     Review of Systems   Constitutional:  Positive for fatigue. Negative for activity change and appetite change.   HENT:  Negative for congestion and dental problem.    Eyes:  Negative for discharge and itching.   Respiratory:  Positive for shortness of breath.    Cardiovascular:  Negative for chest pain.   Gastrointestinal:  Negative for abdominal distention and abdominal pain.   Endocrine: Negative for cold intolerance.   Genitourinary:  Negative for difficulty urinating and dysuria.   Musculoskeletal:  Negative for arthralgias and back pain.   Skin:  Negative for color change.   Neurological:  Negative for dizziness and facial asymmetry.   Hematological:  Negative for adenopathy.   Psychiatric/Behavioral:  Negative for agitation and behavioral problems.      Objective:     Vital Signs (Most Recent):  Temp: 98.6 °F (37 °C) (24 1202)  Pulse: 104 (24 1432)  Resp: (!) 22 (24 1432)  BP: (!) 147/100 (24 1432)  SpO2: 96 % (24 1432) Vital Signs (24h Range):  Temp:  [98.6 °F (37 °C)] 98.6 °F (37 °C)  Pulse:  [104-106] 104  Resp:  [20-22] 22  SpO2:  [89 %-97 %] 96 %  BP: (138-162)/() 147/100     Weight: 132.5 kg (292  lb)  Body mass index is 45.73 kg/m².     Physical Exam  Vitals and nursing note reviewed.   Constitutional:       General: She is not in acute distress.  HENT:      Head: Atraumatic.      Right Ear: External ear normal.      Left Ear: External ear normal.      Nose: Nose normal.      Mouth/Throat:      Mouth: Mucous membranes are moist.   Eyes:      Extraocular Movements: Extraocular movements intact.   Cardiovascular:      Rate and Rhythm: Normal rate.   Pulmonary:      Effort: Pulmonary effort is normal.      Breath sounds: Rales present.   Abdominal:      Palpations: Abdomen is soft.   Musculoskeletal:         General: Normal range of motion.      Cervical back: Normal range of motion.      Right lower leg: Edema present.      Left lower leg: Edema present.   Skin:     General: Skin is warm.   Neurological:      Mental Status: She is alert and oriented to person, place, and time.   Psychiatric:         Behavior: Behavior normal.                Significant Labs: All pertinent labs within the past 24 hours have been reviewed.  CBC:   Recent Labs   Lab 06/18/24  1239   WBC 10.03   HGB 12.5   HCT 42.7        CMP:   Recent Labs   Lab 06/18/24  1241      K 4.3      CO2 26   *   BUN 93*   CREATININE 4.1*   CALCIUM 9.6   ANIONGAP 7*       Significant Imaging: I have reviewed all pertinent imaging results/findings within the past 24 hours.    Assessment/Plan:     * Fluid overload  Maintain iv lasix gtt  Consult Nephro MD now      Morbid obesity  Body mass index is 45.73 kg/m². Morbid obesity complicates all aspects of disease management from diagnostic modalities to treatment.     Chronic kidney disease (CKD), stage V  Creatine stable for now. BMP reviewed- noted Estimated Creatinine Clearance: 17.9 mL/min (A) (based on SCr of 4.1 mg/dL (H)). according to latest data. Based on current GFR, CKD stage is end stage.  Monitor UOP and serial BMP and adjust therapy as needed. Renally dose meds. Avoid  "nephrotoxic medications and procedures.    Renal cell carcinoma of right kidney  Aware       Paroxysmal atrial fibrillation  Chronic stable issue  HR on little higher range  Maintain home meds  Change NOAC to SQ lovenox in view with possible Dialysis cath insertion ?    Type 2 diabetes mellitus with hyperglycemia  Patient's FSGs are controlled on current medication regimen.  Last A1c reviewed-   Lab Results   Component Value Date    HGBA1C 5.4 01/22/2024     Most recent fingerstick glucose reviewed- No results for input(s): "POCTGLUCOSE" in the last 24 hours.  Current correctional scale  Medium  Maintain anti-hyperglycemic dose as follows-   Antihyperglycemics (From admission, onward)      Start     Stop Route Frequency Ordered    06/18/24 1619  insulin aspart U-100 pen 0-10 Units         -- SubQ Before meals & nightly PRN 06/18/24 1519          Hold Oral hypoglycemics while patient is in the hospital.    Essential hypertension  Chronic, controlled. Latest blood pressure and vitals reviewed-     Temp:  [98.6 °F (37 °C)]   Pulse:  [104-106]   Resp:  [20-22]   BP: (138-162)/()   SpO2:  [89 %-97 %] .   Home meds for hypertension were reviewed and noted below.   Hypertension Medications               carvediloL (COREG) 25 MG tablet Take 1 tablet (25 mg total) by mouth 2 (two) times daily.    cloNIDine (CATAPRES) 0.1 MG tablet Take 1 tablet (0.1 mg total) by mouth 3 (three) times daily as needed (PRN SBP > 165 mmHg).    cloNIDine 0.1 mg/24 hr td ptwk (CATAPRES) 0.1 mg/24 hr Place 1 patch onto the skin every 7 days.    furosemide (LASIX) 40 MG tablet Take 40 mg by mouth once daily.    amLODIPine (NORVASC) 10 MG tablet Take 10 mg by mouth once daily.    nitroGLYCERIN (NITROSTAT) 0.4 MG SL tablet Place 1 tablet (0.4 mg total) under the tongue every 5 (five) minutes as needed for Chest pain.            While in the hospital, will manage blood pressure as follows; Continue home antihypertensive regimen    Will utilize " "p.r.n. blood pressure medication only if patient's blood pressure greater than 140/90 and she develops symptoms such as worsening chest pain or shortness of breath.    Coronary artery disease  Stable  Cardiologist is Dr Pedersen      VTE Risk Mitigation (From admission, onward)           Ordered     enoxaparin injection 135 mg  Every 24 hours         06/18/24 1522     IP VTE HIGH RISK PATIENT  Once         06/18/24 1519     Place sequential compression device  Until discontinued         06/18/24 1519                       On 06/18/2024, patient should be placed in hospital observation services under my care.        Pharmacist Renal Dose Adjustment Note    Juhi Taylor is a 73 y.o. female being treated with the medication Enoxaparin    Patient Data:    Vital Signs (Most Recent):  Temp: 98.6 °F (37 °C) (06/18/24 1202)  Pulse: 104 (06/18/24 1432)  Resp: (!) 22 (06/18/24 1432)  BP: (!) 147/100 (06/18/24 1432)  SpO2: 96 % (06/18/24 1432) Vital Signs (72h Range):  Temp:  [98.6 °F (37 °C)]   Pulse:  [104-106]   Resp:  [20-22]   BP: (138-162)/()   SpO2:  [89 %-97 %]        Ht: 5' 7" (1.702 m)  Wt: 132.5 kg (292 lb)  Estimated Creatinine Clearance: 17.9 mL/min (A) (based on SCr of 4.1 mg/dL (H)).  Body mass index is 45.73 kg/m².    Per [facility] renal dosing protocol:     Previous Order: Enoxaparin 135 mg Q12H    Will be changed to:     New Order: Enoxaparin 135 mg Q24H,    Due to: CrCl < 30 mL/min    Renal dose adjustments performed as noted above.    We will continue monitoring and adjusting as necessary.    Pharmacist: John Salter, PharmD  Ext: [phone]        Baldev Hoover MD  Department of Hospital Medicine  [facility] - Emergency Dept          "

## 2024-06-18 NOTE — ED PROVIDER NOTES
Chief complaint:  Shortness of Breath (SOB x 2 weeks since surgery. 89 % on RA)      HPI:  Juhi Taylor is a 73 y.o. female with hx CAD with prior CABG, afib on apixaban htn, DM, CMO with EF 40%, pulmonary htn, NICOLE, ESRD with lap peritoneal dialysis cath placement 6/4/24 not yet on PD presenting with worsening shortness of breath. Preceding sx related to CKD include leg swelling, abdominal wall edema, and SAMPSON.  Patient endorses onset of shortness of breath at rest with orthopnea 4 days prior with gradual worsening.  She denies chest pain.  No new cough.  She endorses increased leg swelling.  She has not yet begun peritoneal dialysis.    ROS: As per HPI and below:  No fever, confusion, syncope, blood in the stools, dark stools.  She endorses mild oliguria absent other urinary complaints such as frequency, urgency, dysuria, hematuria.  No abdominal or chest pain.    Review of patient's allergies indicates:   Allergen Reactions    Percocet [oxycodone-acetaminophen] Itching    Amiodarone analogues      Itching      Irbesartan Swelling    Januvia [sitagliptin]     Jardiance [empagliflozin]      Leg cramps    Lipitor [atorvastatin] Other (See Comments)     Severe leg pain    Linaclotide Other (See Comments) and Nausea And Vomiting     Does not remember    Lubiprostone Other (See Comments) and Palpitations     Does not remember       Patient's Medications   New Prescriptions    No medications on file   Previous Medications    ALLOPURINOL (ZYLOPRIM) 300 MG TABLET    Take 1 tablet (300 mg total) by mouth once daily.    AMLODIPINE (NORVASC) 10 MG TABLET    Take 10 mg by mouth once daily.    APIXABAN (ELIQUIS) 5 MG TAB    Take 1 tablet (5 mg total) by mouth 2 (two) times daily.    BLOOD SUGAR DIAGNOSTIC (TRUE METRIX GLUCOSE TEST STRIP) STRP    Use 1x daily. Insurance preferred.    BLOOD SUGAR DIAGNOSTIC STRP    To check BG 1 time daily, to use with insurance preferred meter    CALCITRIOL (ROCALTROL) 0.5 MCG CAP    Take  0.5 mcg by mouth once daily.    CARVEDILOL (COREG) 25 MG TABLET    Take 1 tablet (25 mg total) by mouth 2 (two) times daily.    CLONIDINE (CATAPRES) 0.1 MG TABLET    Take 1 tablet (0.1 mg total) by mouth 3 (three) times daily as needed (PRN SBP > 165 mmHg).    CLONIDINE 0.1 MG/24 HR TD PTWK (CATAPRES) 0.1 MG/24 HR    Place 1 patch onto the skin every 7 days.    CYANOCOBALAMIN (VITAMIN B-12) 100 MCG TABLET    Take 100 mcg by mouth once daily.    EVOLOCUMAB (REPATHA SURECLICK) 140 MG/ML PNIJ    Inject 1 mL (140 mg total) into the skin every 14 (fourteen) days.    FUROSEMIDE (LASIX) 40 MG TABLET    Take 40 mg by mouth once daily.    LANCETS MISC    To check BG 1 times daily, to use with insurance preferred meter    LORATADINE (CLARITIN) 10 MG TABLET    Take 10 mg by mouth once daily.    MAGNESIUM OXIDE (MAG-OX) 400 MG (241.3 MG MAGNESIUM) TABLET    Take 1 tablet (400 mg total) by mouth daily as needed (cramping).    NITROGLYCERIN (NITROSTAT) 0.4 MG SL TABLET    Place 1 tablet (0.4 mg total) under the tongue every 5 (five) minutes as needed for Chest pain.   Modified Medications    No medications on file   Discontinued Medications    TORSEMIDE (DEMADEX) 20 MG TAB    Take 1 tablet (20 mg total) by mouth once daily.    TRAMADOL (ULTRAM) 50 MG TABLET    Take 50 mg by mouth every 6 (six) hours as needed for Pain.       PMH:  As per HPI and below:  Past Medical History:   Diagnosis Date    Anticoagulant long-term use     Arthritis     Breast cancer 2014    invasive lobular carcinoma    Cancer of kidney 11/2020    RIGHT KIDNEY CANCER    CHF (congestive heart failure)     Coronary artery disease dx 2005    Depression     Diabetes mellitus     Diastolic heart failure secondary to hypertension     Gout     Hyperlipemia     Hypertension     Hypertrophy of nasal turbinates     Kidney mass 2020    Right    Levoscoliosis     Lung nodule     left    Multiple thyroid nodules     NICOLE (obstructive sleep apnea)     uses C-PAP     "Pulmonary hypertension      Past Surgical History:   Procedure Laterality Date    AORTOGRAPHY N/A 2020    Procedure: Aortogram;  Surgeon: Paul Pedersen MD;  Location: [facility] CATH;  Service: Cardiology;  Laterality: N/A;    BREAST SURGERY      CARDIAC CATHETERIZATION  2020    CHOLECYSTECTOMY      COLONOSCOPY      multi -last      CORONARY ARTERY BYPASS GRAFT      ESOPHAGOGASTRODUODENOSCOPY      2012     HAND SURGERY Right     INSERTION, CATHETER, DIALYSIS, PERITONEAL N/A 2024    Procedure: IN Insertion Catheter, Dialysis, Peritoneal - laparoscopic;  Surgeon: Rodriguez Catalan Jr., MD;  Location: [facility];  Service: General;  Laterality: N/A;    LAPAROSCOPIC ROBOT-ASSISTED SURGICAL REMOVAL OF KIDNEY USING DA CHELLE XI Right 03/10/2022    Procedure: XI ROBOTIC NEPHRECTOMY- radical;  Surgeon: Rolando Ramirez MD;  Location: [facility];  Service: Urology;  Laterality: Right;    MASTECTOMY W/ SENTINEL NODE BIOPSY Bilateral 2015    bilateral "dog ears"    NASAL SINUS SURGERY      Dr Bryant FESS/cauterization turbinate     PARTIAL HYSTERECTOMY      PERCUTANEOUS TRANSLUMINAL BALLOON ANGIOPLASTY OF CORONARY ARTERY  2020    Procedure: Angioplasty-coronary;  Surgeon: Paul Pedersen MD;  Location: [facility] CATH;  Service: Cardiology;;    RENAL BIOPSY Right     2021 EJ    TUBAL LIGATION      ULTRASOUND GUIDANCE  2020    Procedure: Ultrasound Guidance;  Surgeon: Paul Pedersen MD;  Location: [facility] CATH;  Service: Cardiology;;       Social History     Socioeconomic History    Marital status: Single    Number of children: 4   Occupational History    Occupation: retired Psych aid    Tobacco Use    Smoking status: Former     Current packs/day: 0.00     Types: Cigarettes     Start date: 2016     Quit date: 2016     Years since quittin.4    Smokeless tobacco: Never    Tobacco comments:     quit    Substance and Sexual Activity    Alcohol use: No    Drug use: No    " Sexual activity: Not Currently     Partners: Male     Birth control/protection: None     Social Determinants of Health     Financial Resource Strain: Medium Risk (11/14/2023)    Overall Financial Resource Strain (CARDIA)     Difficulty of Paying Living Expenses: Somewhat hard   Food Insecurity: Food Insecurity Present (11/14/2023)    Hunger Vital Sign     Worried About Running Out of Food in the Last Year: Sometimes true     Ran Out of Food in the Last Year: Never true   Transportation Needs: No Transportation Needs (11/14/2023)    PRAPARE - Transportation     Lack of Transportation (Medical): No     Lack of Transportation (Non-Medical): No   Physical Activity: Inactive (11/14/2023)    Exercise Vital Sign     Days of Exercise per Week: 0 days     Minutes of Exercise per Session: 0 min   Stress: No Stress Concern Present (11/14/2023)    [employer] - Occupational Stress Questionnaire     Feeling of Stress : Only a little   Housing Stability: Unknown (11/14/2023)    Housing Stability Vital Sign     Unable to Pay for Housing in the Last Year: No     Unstable Housing in the Last Year: No       Family History   Problem Relation Name Age of Onset    Breast cancer Mother      Stroke Father Waqar Sr.     Hypertension Father Waqar Sr.     Hepatitis Brother      Asthma Daughter Nell Taylor     Birth defects Daughter Nell Camejo's two children has cleft lips    Depression Daughter Nell Taylor     Drug abuse Daughter Nell Taylor     Learning disabilities Daughter Nell Taylor     Mental illness Daughter Nell Taylor     Breast cancer Maternal Aunt      Glaucoma Sister      Drug abuse Daughter Nell     Macular degeneration Neg Hx      Retinal detachment Neg Hx         Physical Exam:    Vitals:    06/18/24 1537   BP:    Pulse:    Resp: (!) 21   Temp:      GENERAL:  No apparent distress.  Alert.    HEENT:  Moist mucous membranes.  Normocephalic and atraumatic.   Nasal cannula in place.  NECK:  No swelling.  Midline trachea.   CARDIOVASCULAR:  Regular rate and rhythm.  2+ radial pulses.  No murmur.  PULMONARY:  Coarse rales noted to the posterior basilar to mid lung fields bilaterally.  No wheezes or rhonchi.  No tachypnea or accessory muscle use.  ABDOMEN:  Distended with percussible fluid level.  Nontender.  Left lower quadrant dialysis catheter with no erythema or significant tenderness at the site.    EXTREMITIES:  Warm and well perfused.  Brisk capillary refill.  2+ pitting pedal edema.  Legs are symmetric and nontender to palpation.  NEUROLOGICAL:  Normal mental status.  Appropriate and conversant.    SKIN:  No rashes or ecchymoses.    BACK:  Atraumatic.  No CVA tenderness to palpation.      Labs Reviewed   CBC W/ AUTO DIFFERENTIAL - Abnormal; Notable for the following components:       Result Value    MCH 25.1 (*)     MCHC 29.3 (*)     RDW 19.1 (*)     Gran % 74.0 (*)     Lymph % 15.4 (*)     All other components within normal limits   BASIC METABOLIC PANEL - Abnormal; Notable for the following components:    Glucose 197 (*)     BUN 93 (*)     Creatinine 4.1 (*)     Anion Gap 7 (*)     eGFR 11.1 (*)     All other components within normal limits   TROPONIN I HIGH SENSITIVITY - Abnormal; Notable for the following components:    Troponin I High Sensitivity 46.9 (*)     All other components within normal limits   POCT GLUCOSE MONITORING CONTINUOUS       Current Discharge Medication List        CONTINUE these medications which have NOT CHANGED    Details   allopurinoL (ZYLOPRIM) 300 MG tablet Take 1 tablet (300 mg total) by mouth once daily.  Qty: 90 tablet, Refills: 3    Associated Diagnoses: Gout, unspecified cause, unspecified chronicity, unspecified site      apixaban (ELIQUIS) 5 mg Tab Take 1 tablet (5 mg total) by mouth 2 (two) times daily.  Qty: 180 tablet, Refills: 2    Associated Diagnoses: Acute on chronic systolic CHF (congestive heart failure); Chronic combined  systolic and diastolic heart failure; Coronary artery disease involving native coronary artery of native heart without angina pectoris; Diastolic heart failure secondary to hypertension; Essential hypertension; Pure hypercholesterolemia; Non-rheumatic mitral regurgitation; Paroxysmal atrial fibrillation; Peripheral vascular disease, unspecified; Chronic kidney disease (CKD), stage V; CKD (chronic kidney disease), stage IV; CKD stage 4 secondary to hypertension; Diastolic dysfunction; NICOLE (obstructive sleep apnea); S/P coronary artery stent placement; Statin intolerance      calcitRIOL (ROCALTROL) 0.5 MCG Cap Take 0.5 mcg by mouth once daily.      carvediloL (COREG) 25 MG tablet Take 1 tablet (25 mg total) by mouth 2 (two) times daily.  Qty: 180 tablet, Refills: 2    Comments: This prescription was filled on 10/5/2023. Any refills authorized will be placed on file. - .  Associated Diagnoses: Acute on chronic systolic CHF (congestive heart failure); Chronic combined systolic and diastolic heart failure; Coronary artery disease involving native coronary artery of native heart without angina pectoris; Diastolic heart failure secondary to hypertension; Essential hypertension; Pure hypercholesterolemia; Non-rheumatic mitral regurgitation; Paroxysmal atrial fibrillation; Peripheral vascular disease, unspecified; Chronic kidney disease (CKD), stage V; CKD (chronic kidney disease), stage IV; CKD stage 4 secondary to hypertension; Diastolic dysfunction; NICOLE (obstructive sleep apnea); S/P coronary artery stent placement; Statin intolerance      cloNIDine (CATAPRES) 0.1 MG tablet Take 1 tablet (0.1 mg total) by mouth 3 (three) times daily as needed (PRN SBP > 165 mmHg).  Qty: 90 tablet, Refills: 6    Comments: .      cloNIDine 0.1 mg/24 hr td ptwk (CATAPRES) 0.1 mg/24 hr Place 1 patch onto the skin every 7 days.  Qty: 4 patch, Refills: 4      cyanocobalamin (VITAMIN B-12) 100 MCG tablet Take 100 mcg by mouth once daily.       evolocumab (REPATHA SURECLICK) 140 mg/mL PnIj Inject 1 mL (140 mg total) into the skin every 14 (fourteen) days.  Qty: 2 mL, Refills: 11    Associated Diagnoses: Hyperlipidemia, unspecified hyperlipidemia type      furosemide (LASIX) 40 MG tablet Take 40 mg by mouth once daily.      loratadine (CLARITIN) 10 mg tablet Take 10 mg by mouth once daily.      magnesium oxide (MAG-OX) 400 mg (241.3 mg magnesium) tablet Take 1 tablet (400 mg total) by mouth daily as needed (cramping).  Qty: 30 tablet, Refills: 0    Associated Diagnoses: Chronic combined systolic and diastolic heart failure; Hypomagnesemia      amLODIPine (NORVASC) 10 MG tablet Take 10 mg by mouth once daily.      !! blood sugar diagnostic (TRUE METRIX GLUCOSE TEST STRIP) Strp Use 1x daily. Insurance preferred.  Qty: 100 strip, Refills: 3    Associated Diagnoses: Type 2 diabetes mellitus with hyperglycemia, without long-term current use of insulin      !! blood sugar diagnostic Strp To check BG 1 time daily, to use with insurance preferred meter  Qty: 100 strip, Refills: 3    Associated Diagnoses: Type 2 diabetes mellitus with hyperglycemia, without long-term current use of insulin      lancets Misc To check BG 1 times daily, to use with insurance preferred meter  Qty: 100 each, Refills: 3    Associated Diagnoses: Type 2 diabetes mellitus with hyperglycemia, without long-term current use of insulin      nitroGLYCERIN (NITROSTAT) 0.4 MG SL tablet Place 1 tablet (0.4 mg total) under the tongue every 5 (five) minutes as needed for Chest pain.  Qty: 25 tablet, Refills: 0       !! - Potential duplicate medications found. Please discuss with provider.          Orders Placed This Encounter   Procedures    Critical Care    X-Ray Chest AP    CBC auto differential    Basic metabolic panel (BMP)    Troponin I High Sensitivity    Comprehensive Metabolic Panel (CMP)    Magnesium    CBC with Automated Differential    Diet NPO    Damon to [place]    Damon Catheter Care  every 12 hours    Nurse to discontinue antony when patient no longer meets criteria    Post Antony Catheter Removal Protocol    Vital signs    Bladder scan    Notify Physician    Place sequential compression device    Recheck Blood Glucose:    Cardiac Monitoring - Adult    Full code    Inpatient consult to Nephrology    Pulse Oximetry Q30 Min    Oxygen Continuous    EKG 12-lead    EKG 12-lead    Saline lock IV    Insert Saline lock IV    Saline lock IV    Possible Hospitalization    Place in Observation    Fall precautions       Imaging Results              X-Ray Chest AP (Final result)  Result time 06/18/24 13:20:49      Final result by Damian Perez DO (06/18/24 13:20:49)                   Impression:      1. Unchanged enlarged cardiomediastinal silhouette.  2. Hazy opacification of the right lung base could reflect atelectasis, infiltrate, pleural effusion or combination there of.      Electronically signed by: Damian Perez  Date:    06/18/2024  Time:    13:20               Narrative:    EXAMINATION:  XR CHEST AP PORTABLE    CLINICAL HISTORY:  Shortness of breath;    FINDINGS:  Portable chest with comparison chest x-ray 05/31/2024.  Unchanged enlarged cardiomediastinal silhouette.  There is prominence of the central vascular structures.  Hazy ill-defined opacification of the right lung base noted.Lungs are clear. Pulmonary vasculature is normal. No acute osseous abnormality.                                           MDM:    73 y.o. female with worsening shortness of breath consistent with volume overload in the setting of end-stage renal disease, not having yet begun planned peritoneal dialysis.  Peritoneal dialysis catheter recently placed.  Hypoxia on arrival correcting with nasal cannula.  I do not think she requires positive-pressure ventilation at this point.  Further workup done to exclude other end-organ dysfunction such as electrolyte derangement or severe anemia.  I have discussed with Dr. Martinez  Nephrology and will give Lasix bolus and infusion with Damon catheter to track urine output.  Further consideration to initiating dialysis will be given depending on patient's clinical course and usability of peritoneal dialysis catheter per General surgery.      Chest x-ray appears consistent with volume overload.  She remains doing well on nasal cannula.  I will admit to medicine.  Minimally elevated high sensitivity troponin noted that may be trended.  Low suspicion for ACS.    Diagnoses:    1. SOB  2. Volume overload  3. Hypoxia   4. ESRD    Critical Care    Date/Time: 6/18/2024 4:16 PM    Performed by: Kendall De La Paz MD  Authorized by: Baldev Hoover MD  Direct patient critical care time: 14 minutes  Additional history critical care time: 7 minutes  Ordering / reviewing critical care time: 5 minutes  Documentation critical care time: 6 minutes  Consulting other physicians critical care time: 5 minutes  Total critical care time (exclusive of procedural time) : 37 minutes  Critical care time was exclusive of separately billable procedures and treating other patients.               Kendall De La Paz MD  06/18/24 9910

## 2024-06-18 NOTE — HPI
73 year old pt getting admitted with fluid overload in need of dialysis  Pt had PD catheter insertion 2 weeks ago  She was supposed to start on PD for ESRD but got delayed due to insurance issues  She has oliguria/increased weight gain in past 2 weeks  Also SOB on exertion and cough  Symptoms got worse and she came to ER and got admitted  Pt was started on lasix gtt iv

## 2024-06-18 NOTE — ASSESSMENT & PLAN NOTE
Chronic stable issue  HR on little higher range  Maintain home meds  Change NOAC to SQ lovenox in view with possible Dialysis cath insertion ?

## 2024-06-18 NOTE — ASSESSMENT & PLAN NOTE
"Patient's FSGs are controlled on current medication regimen.  Last A1c reviewed-   Lab Results   Component Value Date    HGBA1C 5.4 01/22/2024     Most recent fingerstick glucose reviewed- No results for input(s): "POCTGLUCOSE" in the last 24 hours.  Current correctional scale  Medium  Maintain anti-hyperglycemic dose as follows-   Antihyperglycemics (From admission, onward)      Start     Stop Route Frequency Ordered    06/18/24 1619  insulin aspart U-100 pen 0-10 Units         -- SubQ Before meals & nightly PRN 06/18/24 1519          Hold Oral hypoglycemics while patient is in the hospital.  "

## 2024-06-18 NOTE — ASSESSMENT & PLAN NOTE
Body mass index is 45.73 kg/m². Morbid obesity complicates all aspects of disease management from diagnostic modalities to treatment.

## 2024-06-18 NOTE — Clinical Note
Diagnosis: Fluid overload [157518]   Future Attending Provider: KATIE BROCK [[id number]]   Place in Observation: [facility] [0703]   Special Needs:: No Special Needs [1]

## 2024-06-18 NOTE — PROGRESS NOTES
"Pharmacist Renal Dose Adjustment Note    Juhi Taylor is a 73 y.o. female being treated with the medication Enoxaparin    Patient Data:    Vital Signs (Most Recent):  Temp: 98.6 °F (37 °C) (06/18/24 1202)  Pulse: 104 (06/18/24 1432)  Resp: (!) 22 (06/18/24 1432)  BP: (!) 147/100 (06/18/24 1432)  SpO2: 96 % (06/18/24 1432) Vital Signs (72h Range):  Temp:  [98.6 °F (37 °C)]   Pulse:  [104-106]   Resp:  [20-22]   BP: (138-162)/()   SpO2:  [89 %-97 %]        Ht: 5' 7" (1.702 m)  Wt: 132.5 kg (292 lb)  Estimated Creatinine Clearance: 17.9 mL/min (A) (based on SCr of 4.1 mg/dL (H)).  Body mass index is 45.73 kg/m².    Per [facility] renal dosing protocol:     Previous Order: Enoxaparin 135 mg Q12H    Will be changed to:     New Order: Enoxaparin 135 mg Q24H,    Due to: CrCl < 30 mL/min    Renal dose adjustments performed as noted above.    We will continue monitoring and adjusting as necessary.    Pharmacist: John Salter PharmD  Ext: [phone]      "

## 2024-06-18 NOTE — CONSULTS
Nephrology Consult Note        Patient Name: Juhi Taylor  MRN: [id number]    Patient Class: OP- Observation   Admission Date: 6/18/2024  Length of Stay: 0 days  Date of Service: 6/18/2024    Attending Physician: Baldev Hoover MD  Primary Care Provider: Tony Salder MD    Reason for Consult: anjalisasimran    SUBJECTIVE:     HPI: 73F fallowed with Dr. Martinez from nephrology for advanced CKD stage 4-5 getting admitted with fluid overload. Pt had PD catheter insertion 2 weeks ago in preparation for initiation of dialysis, however had post-operative issues with increased leakage. Then her PD in initiation and training was delayed due to insurance issues. She has oliguria/increased weight gain in past 2 weeks, SOB on exertion and cough. Symptoms got worse and she came to ER and got admitted.     I saw her in the ER and recommend trial of lasix cassia barakat. Will consult Dr. Catalan to evaluate patient and make recommendation for catheter care - we probably will not be able to use it for now for dialysis.    Review of Systems:  Constitutional:  Negative for chills, fever, malaise/fatigue and weight loss.   HENT:  Negative for hearing loss and nosebleeds.    Eyes:  Negative for blurred vision, double vision and photophobia.   Respiratory:  Negative for cough and wheezing, POSITIVE shortness of breath.    Cardiovascular:  Negative for chest pain, palpitations and POSITIVE leg swelling.   Gastrointestinal:  Negative for abdominal pain, constipation, diarrhea, heartburn, nausea and vomiting.   Genitourinary:  Negative for dysuria, frequency and urgency.   Musculoskeletal:  Negative for falls, joint pain and myalgias.   Skin:  Negative for itching and rash.   Neurological:  Negative for dizziness, speech change, focal weakness, loss of consciousness and headaches.   Endo/Heme/Allergies:  Does not bruise/bleed easily.   Psychiatric/Behavioral:  Negative for depression and substance abuse. The patient is not  "nervous/anxious.      ASSESSMENT/PLAN:     CKD stage 5  Volume overload  CHF exacerbation  Uremia  PD cathether in place  No NSAIDs, ACEI/ARB, IV contrast or other nephrotoxins.  Keep MAP > 60, SBP > 100.  Dose meds for GFR < 30 ml/min.  Renal diet - low K, low phos.  Lasix roshni, cassia, if not working consider hemodialysis or PD.    Anemia of CKD  Hgb and HCT are acceptable. Monitor for now.  Will provide PONCE and/or IV iron as needed to keep Hgb 8-10 range.    MBD / Secondary HPT  Ca, Phos, PTH and vitamin D levels are acceptable.   Phos binders, vitamin D and analogues, calcimimetics will be given as needed.    HTN  Tolerate asymptomatic HTN up to -160.  Continue home meds.  Low sodium diet as tolerated.    Thank you for allowing us to participate in the care of your patient!   We will follow the patient and provide recommendations as needed.         Laboratory:  Recent Labs   Lab 06/18/24  1241      K 4.3      CO2 26   BUN 93*   CREATININE 4.1*   *       Recent Labs   Lab 06/18/24  1241   CALCIUM 9.6       Recent Labs   Lab 10/10/23  1113 01/22/24  1133 05/08/24  1139   PTH, Intact 583.1 H 506.7 H 291.7 H       No results for input(s): "POCTGLUCOSE" in the last 168 hours.    Recent Labs   Lab 04/14/23  1245 07/05/23  1533 01/22/24  1133   Hemoglobin A1C 6.1 H 5.6 5.4       Recent Labs   Lab 06/18/24  1239   WBC 10.03   HGB 12.5   HCT 42.7      MCV 86   MCHC 29.3*   MONO 8.4  0.8   EOSINOPHIL 1.6       No results for input(s): "BILITOT", "BILIDIR", "PROT", "ALBUMIN", "ALKPHOS", "ALT", "AST" in the last 168 hours.    Recent Labs   Lab 03/17/22  2240 04/07/22  1536 04/17/23  1448 07/14/23  2026 01/22/24  1129 03/14/24  2254 05/09/24  1029   Color, UA Colorless A   < > Colorless A  --   --  Yellow Yellow   Appearance, UA Clear   < > Clear  --   --  Clear Clear   pH, UA 5.0   < > 6.0  --   --  6.0 6.0   Specific [place], UA 1.010   < > 1.010  --   --  1.010 1.015   Protein, UA Negative  "  < > 1+ A  --   --  1+ A 1+ A   Glucose, UA Negative   < > Negative  --   --  Negative Negative   Ketones, UA Negative   < > Negative  --   --  Negative Negative   Urobilinogen, UA Negative  --   --   --   --  Negative  --    Bilirubin (UA) Negative   < > Negative  --   --  Negative Negative   Occult Blood UA Negative   < > Negative  --   --  TRACE Negative   Nitrite, UA Negative   < > Negative  --   --  Negative Negative   RBC, UA  --    < > 3   < > 1 3 1   WBC, UA  --    < > 3   < > 1 5 1   Bacteria  --    < > None  --  Occasional None Rare   Hyaline Casts, UA  --    < > 0  --   --  0 0    < > = values in this interval not displayed.             Microbiology Results (last 7 days)       ** No results found for the last 168 hours. **            Review of patient's allergies indicates:   Allergen Reactions    Percocet [oxycodone-acetaminophen] Itching    Amiodarone analogues      Itching      Irbesartan Swelling    Januvia [sitagliptin]     Jardiance [empagliflozin]      Leg cramps    Lipitor [atorvastatin] Other (See Comments)     Severe leg pain    Linaclotide Other (See Comments) and Nausea And Vomiting     Does not remember    Lubiprostone Other (See Comments) and Palpitations     Does not remember       Outpatient meds:  No current facility-administered medications on file prior to encounter.     Current Outpatient Medications on File Prior to Encounter   Medication Sig Dispense Refill    allopurinoL (ZYLOPRIM) 300 MG tablet Take 1 tablet (300 mg total) by mouth once daily. 90 tablet 3    apixaban (ELIQUIS) 5 mg Tab Take 1 tablet (5 mg total) by mouth 2 (two) times daily. 180 tablet 2    calcitRIOL (ROCALTROL) 0.5 MCG Cap Take 0.5 mcg by mouth once daily.      carvediloL (COREG) 25 MG tablet Take 1 tablet (25 mg total) by mouth 2 (two) times daily. 180 tablet 2    cloNIDine (CATAPRES) 0.1 MG tablet Take 1 tablet (0.1 mg total) by mouth 3 (three) times daily as needed (PRN SBP > 165 mmHg). 90 tablet 6     cloNIDine 0.1 mg/24 hr td ptwk (CATAPRES) 0.1 mg/24 hr Place 1 patch onto the skin every 7 days. 4 patch 4    cyanocobalamin (VITAMIN B-12) 100 MCG tablet Take 100 mcg by mouth once daily.      evolocumab (REPATHA SURECLICK) 140 mg/mL PnIj Inject 1 mL (140 mg total) into the skin every 14 (fourteen) days. 2 mL 11    furosemide (LASIX) 40 MG tablet Take 40 mg by mouth once daily.      loratadine (CLARITIN) 10 mg tablet Take 10 mg by mouth once daily.      magnesium oxide (MAG-OX) 400 mg (241.3 mg magnesium) tablet Take 1 tablet (400 mg total) by mouth daily as needed (cramping). 30 tablet 0    amLODIPine (NORVASC) 10 MG tablet Take 10 mg by mouth once daily.      blood sugar diagnostic (TRUE METRIX GLUCOSE TEST STRIP) Strp Use 1x daily. Insurance preferred. 100 strip 3    blood sugar diagnostic Strp To check BG 1 time daily, to use with insurance preferred meter 100 strip 3    lancets Misc To check BG 1 times daily, to use with insurance preferred meter 100 each 3    nitroGLYCERIN (NITROSTAT) 0.4 MG SL tablet Place 1 tablet (0.4 mg total) under the tongue every 5 (five) minutes as needed for Chest pain. 25 tablet 0    [DISCONTINUED] aspirin (ECOTRIN) 81 MG EC tablet Take 1 tablet (81 mg total) by mouth once daily. 90 tablet 3    [DISCONTINUED] DULoxetine (CYMBALTA) 20 MG capsule TAKE ONE CAPSULE BY MOUTH ONCE DAILY 30 capsule 4    [DISCONTINUED] folic acid (FOLVITE) 1 MG tablet Take 1 mg by mouth once daily.      [DISCONTINUED] metFORMIN (GLUCOPHAGE-XR) 500 MG ER 24hr tablet Take 2 tablets (1,000 mg total) by mouth 2 (two) times daily with meals. 360 tablet 3    [DISCONTINUED] sodium bicarbonate 650 MG tablet Take 1 tablet (650 mg total) by mouth once daily. 30 tablet 11    [DISCONTINUED] torsemide (DEMADEX) 20 MG Tab Take 1 tablet (20 mg total) by mouth once daily. 30 tablet 11    [DISCONTINUED] traMADoL (ULTRAM) 50 mg tablet Take 50 mg by mouth every 6 (six) hours as needed for Pain.         Scheduled meds:    [START ON 6/19/2024] amLODIPine  10 mg Oral Daily    carvediloL  25 mg Oral BID    [START ON 6/19/2024] cloNIDine 0.1 mg/24 hr td ptwk  1 patch Transdermal Q7 Days    enoxparin  1 mg/kg Subcutaneous Q24H (treatment, non-standard time)    [START ON 6/19/2024] famotidine  20 mg Oral Daily       Infusions:   furosemide (LASIX) 2 mg/mL continuous infusion (non-titrating)  5 mg/hr Intravenous Continuous 5 mL/hr at 06/18/24 1401 10 mg/hr at 06/18/24 1401       PRN meds:    Current Facility-Administered Medications:     acetaminophen, 650 mg, Oral, Q8H PRN    aluminum-magnesium hydroxide-simethicone, 30 mL, Oral, QID PRN    dextrose 50%, 12.5 g, Intravenous, PRN    dextrose 50%, 25 g, Intravenous, PRN    glucagon (human recombinant), 1 mg, Intramuscular, PRN    glucose, 16 g, Oral, PRN    glucose, 24 g, Oral, PRN    HYDROcodone-acetaminophen, 1 tablet, Oral, Q6H PRN    insulin aspart U-100, 0-10 Units, Subcutaneous, QID (AC + HS) PRN    melatonin, 6 mg, Oral, Nightly PRN    ondansetron, 4 mg, Intravenous, Q6H PRN    Past Medical History:   Diagnosis Date    Anticoagulant long-term use     Arthritis     Breast cancer 2014    invasive lobular carcinoma    Cancer of kidney 11/2020    RIGHT KIDNEY CANCER    CHF (congestive heart failure)     Coronary artery disease dx 2005    Depression     Diabetes mellitus     Diastolic heart failure secondary to hypertension     Gout     Hyperlipemia     Hypertension     Hypertrophy of nasal turbinates     Kidney mass 2020    Right    Levoscoliosis     Lung nodule     left    Multiple thyroid nodules     NICOLE (obstructive sleep apnea)     uses C-PAP    Pulmonary hypertension      Past Surgical History:   Procedure Laterality Date    AORTOGRAPHY N/A 12/04/2020    Procedure: Aortogram;  Surgeon: Paul Pedersen MD;  Location: [facility];  Service: Cardiology;  Laterality: N/A;    BREAST SURGERY      CARDIAC CATHETERIZATION  12/2020    CHOLECYSTECTOMY      COLONOSCOPY      multi -last 2014      "CORONARY ARTERY BYPASS GRAFT      ESOPHAGOGASTRODUODENOSCOPY      2012     HAND SURGERY Right     INSERTION, CATHETER, DIALYSIS, PERITONEAL N/A 2024    Procedure: IN Insertion Catheter, Dialysis, Peritoneal - laparoscopic;  Surgeon: Rodriguez Catalan Jr., MD;  Location: [facility] OR;  Service: General;  Laterality: N/A;    LAPAROSCOPIC ROBOT-ASSISTED SURGICAL REMOVAL OF KIDNEY USING DA CHELLE XI Right 03/10/2022    Procedure: XI ROBOTIC NEPHRECTOMY- radical;  Surgeon: Rolando Ramirez MD;  Location: [facility] OR;  Service: Urology;  Laterality: Right;    MASTECTOMY W/ SENTINEL NODE BIOPSY Bilateral 2015    bilateral "dog ears"    NASAL SINUS SURGERY      Dr Bryant FESS/cauterization turbinate     PARTIAL HYSTERECTOMY      PERCUTANEOUS TRANSLUMINAL BALLOON ANGIOPLASTY OF CORONARY ARTERY  2020    Procedure: Angioplasty-coronary;  Surgeon: Paul Pedersen MD;  Location: [facility] CATH;  Service: Cardiology;;    RENAL BIOPSY Right     2021 EJ    TUBAL LIGATION      ULTRASOUND GUIDANCE  2020    Procedure: Ultrasound Guidance;  Surgeon: Paul Pedersen MD;  Location: [facility] CATH;  Service: Cardiology;;     Family History   Problem Relation Name Age of Onset    Breast cancer Mother      Stroke Father Waqar Sr.     Hypertension Father Waqar Sr.     Hepatitis Brother      Asthma Daughter Nell Taylor     Birth defects Daughter Nell Camejo's two children has cleft lips    Depression Daughter Nell Taylor     Drug abuse Daughter Nell Taylor     Learning disabilities Daughter Nell Taylor     Mental illness Daughter Nell Taylor     Breast cancer Maternal Aunt      Glaucoma Sister      Drug abuse Daughter Nell     Macular degeneration Neg Hx      Retinal detachment Neg Hx       Social History     Tobacco Use    Smoking status: Former     Current packs/day: 0.00     Types: Cigarettes     Start date: 2016     Quit date: 2016     Years since quittin.4    " Smokeless tobacco: Never    Tobacco comments:     quit    Substance Use Topics    Alcohol use: No    Drug use: No       OBJECTIVE:     Vital Signs and IO:  Temp:  [98.6 °F (37 °C)]   Pulse:  [103-106]   Resp:  [20-22]   BP: (138-162)/()   SpO2:  [89 %-97 %]   No intake/output data recorded.  Wt Readings from Last 5 Encounters:   06/18/24 132.5 kg (292 lb)   06/04/24 134.3 kg (296 lb)   05/27/24 134.3 kg (296 lb)   04/01/24 131.7 kg (290 lb 4.8 oz)   03/27/24 132 kg (291 lb 0.1 oz)     Body mass index is 45.73 kg/m².    Physical Exam  Constitutional:       General: Patient is not in acute distress.     Appearance: Patient is well-developed. She is not diaphoretic.   HENT:      Head: Normocephalic and atraumatic.      Mouth/Throat: Mucous membranes are moist.   Eyes:      General: No scleral icterus.     Pupils: Pupils are equal, round, and reactive to light.   Cardiovascular:      Rate and Rhythm: Normal rate and regular rhythm.   Pulmonary:      Effort: Pulmonary effort is normal. No respiratory distress.      Breath sounds: No stridor.   Abdominal:      General: There is no distension.      Palpations: Abdomen is soft.   Musculoskeletal:         General: No deformity. Normal range of motion.      Cervical back: Neck supple.   Skin:     General: Skin is warm and dry.      Findings: No rash present. No erythema.   Neurological:      Mental Status: Patient is alert and oriented to person, place, and time.      Cranial Nerves: No cranial nerve deficit.   Psychiatric:         Behavior: Behavior normal.          Patient care time was spent personally by me on the following activities:     Obtaining a history.  Examination of patient.  Providing medical care at the patients bedside.  Developing a treatment plan with patient or surrogate and bedside caregivers.  Ordering and reviewing laboratory studies, radiographic studies, pulse oximetry.  Ordering and performing treatments and interventions.  Evaluation  of patient's response to treatment.  Discussions with consultants while on the unit and immediately available to the patient.  Re-evaluation of the patient's condition.  Documentation in the medical record.     Paolo Martinez MD    [facility]  [address] [place] [address]  [place] [address]    [phone] - tel  [phone] - fax    6/18/2024

## 2024-06-18 NOTE — SUBJECTIVE & OBJECTIVE
"Past Medical History:   Diagnosis Date    Anticoagulant long-term use     Arthritis     Breast cancer 2014    invasive lobular carcinoma    Cancer of kidney 11/2020    RIGHT KIDNEY CANCER    CHF (congestive heart failure)     Coronary artery disease dx 2005    Depression     Diabetes mellitus     Diastolic heart failure secondary to hypertension     Gout     Hyperlipemia     Hypertension     Hypertrophy of nasal turbinates     Kidney mass 2020    Right    Levoscoliosis     Lung nodule     left    Multiple thyroid nodules     NICOLE (obstructive sleep apnea)     uses C-PAP    Pulmonary hypertension        Past Surgical History:   Procedure Laterality Date    AORTOGRAPHY N/A 12/04/2020    Procedure: Aortogram;  Surgeon: Paul Pedersen MD;  Location: [facility] CATH;  Service: Cardiology;  Laterality: N/A;    BREAST SURGERY      CARDIAC CATHETERIZATION  12/2020    CHOLECYSTECTOMY      COLONOSCOPY      multi -last 2014     CORONARY ARTERY BYPASS GRAFT      ESOPHAGOGASTRODUODENOSCOPY      2012     HAND SURGERY Right     INSERTION, CATHETER, DIALYSIS, PERITONEAL N/A 6/4/2024    Procedure: IN Insertion Catheter, Dialysis, Peritoneal - laparoscopic;  Surgeon: Rodriguez Catalan Jr., MD;  Location: [facility] OR;  Service: General;  Laterality: N/A;    LAPAROSCOPIC ROBOT-ASSISTED SURGICAL REMOVAL OF KIDNEY USING DA CHELLE XI Right 03/10/2022    Procedure: XI ROBOTIC NEPHRECTOMY- radical;  Surgeon: Rloando Ramirez MD;  Location: [facility] OR;  Service: Urology;  Laterality: Right;    MASTECTOMY W/ SENTINEL NODE BIOPSY Bilateral 01/21/2015    bilateral "dog ears"    NASAL SINUS SURGERY  2015    Dr Bryant FESS/cauterization turbinate     PARTIAL HYSTERECTOMY  1989    PERCUTANEOUS TRANSLUMINAL BALLOON ANGIOPLASTY OF CORONARY ARTERY  12/04/2020    Procedure: Angioplasty-coronary;  Surgeon: Paul Pedersen MD;  Location: [facility] CATH;  Service: Cardiology;;    RENAL BIOPSY Right     9/20/2021 EJ    TUBAL LIGATION      ULTRASOUND GUIDANCE  " 12/04/2020    Procedure: Ultrasound Guidance;  Surgeon: Paul Pedersen MD;  Location: [facility];  Service: Cardiology;;       Review of patient's allergies indicates:   Allergen Reactions    Percocet [oxycodone-acetaminophen] Itching    Amiodarone analogues      Itching      Irbesartan Swelling    Januvia [sitagliptin]     Jardiance [empagliflozin]      Leg cramps    Lipitor [atorvastatin] Other (See Comments)     Severe leg pain    Linaclotide Other (See Comments) and Nausea And Vomiting     Does not remember    Lubiprostone Other (See Comments) and Palpitations     Does not remember       No current facility-administered medications on file prior to encounter.     Current Outpatient Medications on File Prior to Encounter   Medication Sig    allopurinoL (ZYLOPRIM) 300 MG tablet Take 1 tablet (300 mg total) by mouth once daily.    apixaban (ELIQUIS) 5 mg Tab Take 1 tablet (5 mg total) by mouth 2 (two) times daily.    calcitRIOL (ROCALTROL) 0.5 MCG Cap Take 0.5 mcg by mouth once daily.    carvediloL (COREG) 25 MG tablet Take 1 tablet (25 mg total) by mouth 2 (two) times daily.    cloNIDine (CATAPRES) 0.1 MG tablet Take 1 tablet (0.1 mg total) by mouth 3 (three) times daily as needed (PRN SBP > 165 mmHg).    cloNIDine 0.1 mg/24 hr td ptwk (CATAPRES) 0.1 mg/24 hr Place 1 patch onto the skin every 7 days.    cyanocobalamin (VITAMIN B-12) 100 MCG tablet Take 100 mcg by mouth once daily.    evolocumab (REPATHA SURECLICK) 140 mg/mL PnIj Inject 1 mL (140 mg total) into the skin every 14 (fourteen) days.    furosemide (LASIX) 40 MG tablet Take 40 mg by mouth once daily.    loratadine (CLARITIN) 10 mg tablet Take 10 mg by mouth once daily.    magnesium oxide (MAG-OX) 400 mg (241.3 mg magnesium) tablet Take 1 tablet (400 mg total) by mouth daily as needed (cramping).    amLODIPine (NORVASC) 10 MG tablet Take 10 mg by mouth once daily.    blood sugar diagnostic (TRUE METRIX GLUCOSE TEST STRIP) Strp Use 1x daily. Insurance  preferred.    blood sugar diagnostic Strp To check BG 1 time daily, to use with insurance preferred meter    lancets Misc To check BG 1 times daily, to use with insurance preferred meter    nitroGLYCERIN (NITROSTAT) 0.4 MG SL tablet Place 1 tablet (0.4 mg total) under the tongue every 5 (five) minutes as needed for Chest pain.    [DISCONTINUED] aspirin (ECOTRIN) 81 MG EC tablet Take 1 tablet (81 mg total) by mouth once daily.    [DISCONTINUED] DULoxetine (CYMBALTA) 20 MG capsule TAKE ONE CAPSULE BY MOUTH ONCE DAILY    [DISCONTINUED] folic acid (FOLVITE) 1 MG tablet Take 1 mg by mouth once daily.    [DISCONTINUED] metFORMIN (GLUCOPHAGE-XR) 500 MG ER 24hr tablet Take 2 tablets (1,000 mg total) by mouth 2 (two) times daily with meals.    [DISCONTINUED] sodium bicarbonate 650 MG tablet Take 1 tablet (650 mg total) by mouth once daily.    [DISCONTINUED] torsemide (DEMADEX) 20 MG Tab Take 1 tablet (20 mg total) by mouth once daily.    [DISCONTINUED] traMADoL (ULTRAM) 50 mg tablet Take 50 mg by mouth every 6 (six) hours as needed for Pain.     Family History       Problem Relation (Age of Onset)    Asthma Daughter    Birth defects Daughter    Breast cancer Mother, Maternal Aunt    Depression Daughter    Drug abuse Daughter, Daughter    Glaucoma Sister    Hepatitis Brother    Hypertension Father    Learning disabilities Daughter    Mental illness Daughter    Stroke Father          Tobacco Use    Smoking status: Former     Current packs/day: 0.00     Types: Cigarettes     Start date: 2016     Quit date: 2016     Years since quittin.4    Smokeless tobacco: Never    Tobacco comments:     quit    Substance and Sexual Activity    Alcohol use: No    Drug use: No    Sexual activity: Not Currently     Partners: Male     Birth control/protection: None     Review of Systems   Constitutional:  Positive for fatigue. Negative for activity change and appetite change.   HENT:  Negative for congestion and dental  problem.    Eyes:  Negative for discharge and itching.   Respiratory:  Positive for shortness of breath.    Cardiovascular:  Negative for chest pain.   Gastrointestinal:  Negative for abdominal distention and abdominal pain.   Endocrine: Negative for cold intolerance.   Genitourinary:  Negative for difficulty urinating and dysuria.   Musculoskeletal:  Negative for arthralgias and back pain.   Skin:  Negative for color change.   Neurological:  Negative for dizziness and facial asymmetry.   Hematological:  Negative for adenopathy.   Psychiatric/Behavioral:  Negative for agitation and behavioral problems.      Objective:     Vital Signs (Most Recent):  Temp: 98.6 °F (37 °C) (06/18/24 1202)  Pulse: 104 (06/18/24 1432)  Resp: (!) 22 (06/18/24 1432)  BP: (!) 147/100 (06/18/24 1432)  SpO2: 96 % (06/18/24 1432) Vital Signs (24h Range):  Temp:  [98.6 °F (37 °C)] 98.6 °F (37 °C)  Pulse:  [104-106] 104  Resp:  [20-22] 22  SpO2:  [89 %-97 %] 96 %  BP: (138-162)/() 147/100     Weight: 132.5 kg (292 lb)  Body mass index is 45.73 kg/m².     Physical Exam  Vitals and nursing note reviewed.   Constitutional:       General: She is not in acute distress.  HENT:      Head: Atraumatic.      Right Ear: External ear normal.      Left Ear: External ear normal.      Nose: Nose normal.      Mouth/Throat:      Mouth: Mucous membranes are moist.   Eyes:      Extraocular Movements: Extraocular movements intact.   Cardiovascular:      Rate and Rhythm: Normal rate.   Pulmonary:      Effort: Pulmonary effort is normal.      Breath sounds: Rales present.   Abdominal:      Palpations: Abdomen is soft.   Musculoskeletal:         General: Normal range of motion.      Cervical back: Normal range of motion.      Right lower leg: Edema present.      Left lower leg: Edema present.   Skin:     General: Skin is warm.   Neurological:      Mental Status: She is alert and oriented to person, place, and time.   Psychiatric:         Behavior: Behavior  normal.                Significant Labs: All pertinent labs within the past 24 hours have been reviewed.  CBC:   Recent Labs   Lab 06/18/24  1239   WBC 10.03   HGB 12.5   HCT 42.7        CMP:   Recent Labs   Lab 06/18/24  1241      K 4.3      CO2 26   *   BUN 93*   CREATININE 4.1*   CALCIUM 9.6   ANIONGAP 7*       Significant Imaging: I have reviewed all pertinent imaging results/findings within the past 24 hours.

## 2024-06-19 ENCOUNTER — CLINICAL SUPPORT (OUTPATIENT)
Dept: CARDIOLOGY | Facility: HOSPITAL | Age: 74
End: 2024-06-19
Attending: FAMILY MEDICINE
Payer: MEDICARE

## 2024-06-19 VITALS — BODY MASS INDEX: 45.85 KG/M2 | HEIGHT: 67 IN | WEIGHT: 292.13 LBS

## 2024-06-19 PROBLEM — J96.01 ACUTE HYPOXIC RESPIRATORY FAILURE: Status: ACTIVE | Noted: 2024-06-18

## 2024-06-19 LAB
ALBUMIN SERPL BCP-MCNC: 3.4 G/DL (ref 3.5–5.2)
ALP SERPL-CCNC: 84 U/L (ref 55–135)
ALT SERPL W/O P-5'-P-CCNC: 16 U/L (ref 10–44)
ANION GAP SERPL CALC-SCNC: 8 MMOL/L (ref 8–16)
AST SERPL-CCNC: 14 U/L (ref 10–40)
BASOPHILS # BLD AUTO: 0.03 K/UL (ref 0–0.2)
BASOPHILS NFR BLD: 0.3 % (ref 0–1.9)
BILIRUB SERPL-MCNC: 0.8 MG/DL (ref 0.1–1)
BUN SERPL-MCNC: 88 MG/DL (ref 8–23)
CALCIUM SERPL-MCNC: 9.6 MG/DL (ref 8.7–10.5)
CHLORIDE SERPL-SCNC: 106 MMOL/L (ref 95–110)
CO2 SERPL-SCNC: 27 MMOL/L (ref 23–29)
CREAT SERPL-MCNC: 4.2 MG/DL (ref 0.5–1.4)
DIFFERENTIAL METHOD BLD: ABNORMAL
EOSINOPHIL # BLD AUTO: 0.1 K/UL (ref 0–0.5)
EOSINOPHIL NFR BLD: 1 % (ref 0–8)
ERYTHROCYTE [DISTWIDTH] IN BLOOD BY AUTOMATED COUNT: 18.9 % (ref 11.5–14.5)
EST. GFR  (NO RACE VARIABLE): 10.8 ML/MIN/1.73 M^2
ESTIMATED AVG GLUCOSE: 148 MG/DL (ref 68–131)
GLUCOSE SERPL-MCNC: 163 MG/DL (ref 70–110)
GLUCOSE SERPL-MCNC: 175 MG/DL (ref 70–110)
GLUCOSE SERPL-MCNC: 180 MG/DL (ref 70–110)
GLUCOSE SERPL-MCNC: 221 MG/DL (ref 70–110)
HBA1C MFR BLD: 6.8 % (ref 4.5–6.2)
HCT VFR BLD AUTO: 42.1 % (ref 37–48.5)
HGB BLD-MCNC: 12.3 G/DL (ref 12–16)
IMM GRANULOCYTES # BLD AUTO: 0.04 K/UL (ref 0–0.04)
IMM GRANULOCYTES NFR BLD AUTO: 0.4 % (ref 0–0.5)
LYMPHOCYTES # BLD AUTO: 1.5 K/UL (ref 1–4.8)
LYMPHOCYTES NFR BLD: 15.4 % (ref 18–48)
MAGNESIUM SERPL-MCNC: 2.5 MG/DL (ref 1.6–2.6)
MCH RBC QN AUTO: 25.6 PG (ref 27–31)
MCHC RBC AUTO-ENTMCNC: 29.2 G/DL (ref 32–36)
MCV RBC AUTO: 88 FL (ref 82–98)
MONOCYTES # BLD AUTO: 0.9 K/UL (ref 0.3–1)
MONOCYTES NFR BLD: 8.8 % (ref 4–15)
NEUTROPHILS # BLD AUTO: 7.2 K/UL (ref 1.8–7.7)
NEUTROPHILS NFR BLD: 74.1 % (ref 38–73)
NRBC BLD-RTO: 0 /100 WBC
PLATELET # BLD AUTO: 228 K/UL (ref 150–450)
PMV BLD AUTO: 10.1 FL (ref 9.2–12.9)
POTASSIUM SERPL-SCNC: 4.6 MMOL/L (ref 3.5–5.1)
PROCALCITONIN SERPL IA-MCNC: 0.24 NG/ML (ref 0–0.5)
PROT SERPL-MCNC: 6.1 G/DL (ref 6–8.4)
RBC # BLD AUTO: 4.81 M/UL (ref 4–5.4)
SODIUM SERPL-SCNC: 141 MMOL/L (ref 136–145)
WBC # BLD AUTO: 9.74 K/UL (ref 3.9–12.7)

## 2024-06-19 PROCEDURE — 84145 PROCALCITONIN (PCT): CPT | Performed by: INTERNAL MEDICINE

## 2024-06-19 PROCEDURE — 63700000 PHARM REV CODE 250 ALT 637 W/O HCPCS: Performed by: INTERNAL MEDICINE

## 2024-06-19 PROCEDURE — 25000003 PHARM REV CODE 250: Performed by: INTERNAL MEDICINE

## 2024-06-19 PROCEDURE — 80053 COMPREHEN METABOLIC PANEL: CPT | Performed by: INTERNAL MEDICINE

## 2024-06-19 PROCEDURE — 97162 PT EVAL MOD COMPLEX 30 MIN: CPT

## 2024-06-19 PROCEDURE — 63700000 PHARM REV CODE 250 ALT 637 W/O HCPCS: Performed by: FAMILY MEDICINE

## 2024-06-19 PROCEDURE — 21400001 HC TELEMETRY ROOM

## 2024-06-19 PROCEDURE — 96365 THER/PROPH/DIAG IV INF INIT: CPT

## 2024-06-19 PROCEDURE — 36415 COLL VENOUS BLD VENIPUNCTURE: CPT | Performed by: INTERNAL MEDICINE

## 2024-06-19 PROCEDURE — 63600175 PHARM REV CODE 636 W HCPCS: Performed by: FAMILY MEDICINE

## 2024-06-19 PROCEDURE — 63600175 PHARM REV CODE 636 W HCPCS: Performed by: INTERNAL MEDICINE

## 2024-06-19 PROCEDURE — 25000003 PHARM REV CODE 250: Performed by: FAMILY MEDICINE

## 2024-06-19 PROCEDURE — 93306 TTE W/DOPPLER COMPLETE: CPT

## 2024-06-19 PROCEDURE — 83735 ASSAY OF MAGNESIUM: CPT | Performed by: INTERNAL MEDICINE

## 2024-06-19 PROCEDURE — 82962 GLUCOSE BLOOD TEST: CPT

## 2024-06-19 PROCEDURE — 83036 HEMOGLOBIN GLYCOSYLATED A1C: CPT | Performed by: INTERNAL MEDICINE

## 2024-06-19 PROCEDURE — 97530 THERAPEUTIC ACTIVITIES: CPT

## 2024-06-19 PROCEDURE — 96376 TX/PRO/DX INJ SAME DRUG ADON: CPT

## 2024-06-19 PROCEDURE — 85025 COMPLETE CBC W/AUTO DIFF WBC: CPT | Performed by: INTERNAL MEDICINE

## 2024-06-19 PROCEDURE — 93306 TTE W/DOPPLER COMPLETE: CPT | Mod: 26,,, | Performed by: INTERNAL MEDICINE

## 2024-06-19 RX ORDER — FLUCONAZOLE 100 MG/1
100 TABLET ORAL DAILY
Status: DISCONTINUED | OUTPATIENT
Start: 2024-06-19 | End: 2024-06-20

## 2024-06-19 RX ORDER — HYDRALAZINE HYDROCHLORIDE 20 MG/ML
10 INJECTION INTRAMUSCULAR; INTRAVENOUS EVERY 6 HOURS PRN
Status: DISCONTINUED | OUTPATIENT
Start: 2024-06-19 | End: 2024-06-23

## 2024-06-19 RX ORDER — MUPIROCIN 20 MG/G
OINTMENT TOPICAL 2 TIMES DAILY
Status: DISPENSED | OUTPATIENT
Start: 2024-06-19 | End: 2024-06-24

## 2024-06-19 RX ORDER — AZITHROMYCIN 250 MG/1
500 TABLET, FILM COATED ORAL DAILY
Status: DISCONTINUED | OUTPATIENT
Start: 2024-06-19 | End: 2024-06-20

## 2024-06-19 RX ADMIN — INSULIN ASPART 1 UNITS: 100 INJECTION, SOLUTION INTRAVENOUS; SUBCUTANEOUS at 08:06

## 2024-06-19 RX ADMIN — AZITHROMYCIN DIHYDRATE 500 MG: 250 TABLET ORAL at 04:06

## 2024-06-19 RX ADMIN — CARVEDILOL 25 MG: 25 TABLET, FILM COATED ORAL at 08:06

## 2024-06-19 RX ADMIN — INSULIN ASPART 2 UNITS: 100 INJECTION, SOLUTION INTRAVENOUS; SUBCUTANEOUS at 05:06

## 2024-06-19 RX ADMIN — ENOXAPARIN SODIUM 135 MG: 150 INJECTION SUBCUTANEOUS at 08:06

## 2024-06-19 RX ADMIN — AMLODIPINE BESYLATE 10 MG: 5 TABLET ORAL at 08:06

## 2024-06-19 RX ADMIN — MUPIROCIN 1 G: 20 OINTMENT TOPICAL at 08:06

## 2024-06-19 RX ADMIN — CLONIDINE 1 PATCH: 0.1 PATCH, EXTENDED RELEASE TRANSDERMAL at 09:06

## 2024-06-19 RX ADMIN — FLUCONAZOLE 100 MG: 100 TABLET ORAL at 05:06

## 2024-06-19 RX ADMIN — INSULIN ASPART 4 UNITS: 100 INJECTION, SOLUTION INTRAVENOUS; SUBCUTANEOUS at 12:06

## 2024-06-19 RX ADMIN — FUROSEMIDE 5 MG/HR: 10 INJECTION, SOLUTION INTRAMUSCULAR; INTRAVENOUS at 07:06

## 2024-06-19 RX ADMIN — CEFTRIAXONE SODIUM 1 G: 1 INJECTION, POWDER, FOR SOLUTION INTRAMUSCULAR; INTRAVENOUS at 04:06

## 2024-06-19 RX ADMIN — FAMOTIDINE 20 MG: 20 TABLET ORAL at 08:06

## 2024-06-19 NOTE — HOSPITAL COURSE
Patient is a 73-year-old female who was admitted for acute hypoxic respiratory failure likely multifactorial from CKD and CHF.  Patient had PD cath placed 2 weeks ago however due to insurance situation never received dialysis. EF 40 -45% with elevated BNP. Patient was placed on Furosemide and metolazone with poor urine output eventually leading to dialysis.Nephrology is following. Discontinued antibiotics since infection was ruled out.   Patient got PEA cardiac arrest during the dialysis and then resuscitated and got ROSC quickly and admitted to ICU and intubated.  Later initiated hemodialysis again and later was able to extubate.  Patient had significant elevation of troponin cardiology consulted.  No acute intervention recommended because of clinical condition and later patient is not keen to do the angiogram was stress test.  Patient was on heparin but needed to hold for some days because of oozing from the left dialysis catheter access and DDAVP 1 dose given.  Patient also developed thrombocytopenia most likely from dialysis but later platelet count improved and heparin restarted.  Echocardiogram was done and showed about 25% ejection fraction and Cardiology reviewed and recommended ICD.  Patient also developed AFib with RVR and needed amiodarone drip briefly but patient history of allergy and did not continue. Patient is getting dialysis and over patient is feeling better and downgraded.  Patient is going to need rehab placement.     Patient noted to have uptrended leukocytosis. Left trailysis cathter had a bruising around and possible pus collection and this line is removed. Blood culture and tip culture so far is no growth. Patient is currently on cefepime and Vancomycin. Patient also continue to be on Afib with RVR, has refused multiple medications. Patient was started on cardizem drip by cardiology. Patient and family going back and forth about transferring to [facility]. Initially they refused to put another  dialysis catheter here, but now has agreed. They have also met with palliative care. Not agreeable for hospice.

## 2024-06-19 NOTE — PLAN OF CARE
[facility] - Emergency Dept  Initial Discharge Assessment       Primary Care Provider: Tony Sadler MD    Admission Diagnosis: Fluid overload [E87.70]    Admission Date: 6/18/2024  Expected Discharge Date:       with Pt at bedside to complete discharge assessment. Pt AAOx4s. Demographics, PCP, and insurance verified. No home health. Pt reports peritoneal dialysis at home daily. Pt reports ADLs require the assistance of one person. Pt reports her adult daughter helps her with ADLs.  Pt verbalized plan to discharge home via family transport. Pt has no other needs to be addressed at this time.     Transition of Care Barriers: None    Payor: MEDICARE / Plan: MEDICARE PART A & B / Product Type: Government /     Extended Emergency Contact Information  Primary Emergency Contact: Maria Luisa López   [place]  Mobile Phone: [phone]  Relation: Daughter    Discharge Plan A: Home with family  Discharge Plan B: Home      [id number] - [place], [place] - [address]  [address]  [facility] [address]  Phone: [phone] Fax: [phone]    [facility] - [place], [place] - [address]  [address]  [facility] [address]  Phone: [phone] Fax: [phone]      Initial Assessment (most recent)       Adult Discharge Assessment - 06/18/24 2207          Discharge Assessment    Assessment Type Discharge Planning Assessment     Confirmed/corrected address, phone number and insurance Yes     Confirmed Demographics Correct on Facesheet     Source of Information patient     When was your last doctors appointment? --   Pt is unsure of her last PCP appointment.    Does patient/caregiver understand observation status Yes     Communicated HARINI with patient/caregiver Date not available/Unable to determine     Reason For Admission Fluid overload     People in Home child(stan), adult;grandchild(stan)     Do you expect to return to your current living situation? Yes     Do  you have help at home or someone to help you manage your care at home? Yes     Who are your caregiver(s) and their phone number(s)? Daughter: Nell Taylor     Current cognitive status: Alert/Oriented     Walking or Climbing Stairs Difficulty yes     Walking or Climbing Stairs ambulation difficulty, assistance 1 person     Dressing/Bathing Difficulty yes     Dressing/Bathing bathing difficulty, assistance 1 person;dressing difficulty, assistance 1 person     Home Accessibility wheelchair accessible     Home Layout Able to live on 1st floor     Equipment Currently Used at Home walker, standard;shower chair     Readmission within 30 days? Yes   [place]    Patient currently being followed by outpatient case management? No     Do you currently have service(s) that help you manage your care at home? No     Do you take prescription medications? Yes     Do you have prescription coverage? Yes     Coverage Payor: MEDICARE - MEDICARE PART A & B -     Do you have any problems affording any of your prescribed medications? No     Is the patient taking medications as prescribed? yes     How do you get to doctors appointments? family or friend will provide     Are you on dialysis? Yes     Dialysis Name and Scheduled days Peritoneal Dialysis at home daily.     Do you take coumadin? No     Discharge Plan A Home with family     Discharge Plan B Home     DME Needed Upon Discharge  none     Discharge Plan discussed with: Patient     Transition of Care Barriers None                                 DISPLAY PLAN FREE TEXT

## 2024-06-19 NOTE — ASSESSMENT & PLAN NOTE
Patient had PD cath placed 2 weeks ago however due to insurance situation never received dialysis  Lasix gtt, antony, if not working consider hemodialysis or PD.

## 2024-06-19 NOTE — SUBJECTIVE & OBJECTIVE
Interval History:  Patient said she is not feeling well and she feels that she has pneumonia and she would really like some antibiotics    Review of Systems   All other systems reviewed and are negative.    Objective:     Vital Signs (Most Recent):  Temp: 98.2 °F (36.8 °C) (06/19/24 1136)  Pulse: 105 (06/19/24 1200)  Resp: (!) 27 (06/19/24 1200)  BP: (!) 152/102 (06/19/24 1200)  SpO2: (!) 93 % (06/19/24 1200) Vital Signs (24h Range):  Temp:  [98 °F (36.7 °C)-98.2 °F (36.8 °C)] 98.2 °F (36.8 °C)  Pulse:  [] 105  Resp:  [19-27] 27  SpO2:  [89 %-97 %] 93 %  BP: (128-194)/() 152/102     Weight: 132.5 kg (292 lb)  Body mass index is 45.73 kg/m².    Intake/Output Summary (Last 24 hours) at 6/19/2024 1633  Last data filed at 6/19/2024 1300  Gross per 24 hour   Intake 240 ml   Output 1125 ml   Net -885 ml         Physical Exam  Constitutional:       Appearance: She is obese.   Cardiovascular:      Rate and Rhythm: Normal rate.   Pulmonary:      Breath sounds: Wheezing and rales present.   Musculoskeletal:         General: Swelling present.   Neurological:      Mental Status: She is oriented to person, place, and time.             Significant Labs: All pertinent labs within the past 24 hours have been reviewed.    Significant Imaging: I have reviewed all pertinent imaging results/findings within the past 24 hours.

## 2024-06-19 NOTE — PLAN OF CARE
06/18/24 2215   ESPINOZA Message   Medicare Outpatient and Observation Notification regarding financial responsibility Given to patient/caregiver;Explained to patient/caregiver;Signed/date by patient/caregiver   Date ESPINOZA was signed 06/18/24   Time ESPINOZA was signed 2200     Pt was explained ESPINOZA. Pt verbalized understanding of ESPINOZA and signed. ESPINOZA scanned to .

## 2024-06-19 NOTE — PROGRESS NOTES
Nephrology Consult Note        Patient Name: Juhi Taylor  MRN: [id number]    Patient Class: IP- Inpatient   Admission Date: 6/18/2024  Length of Stay: 0 days  Date of Service: 6/19/2024    Attending Physician: Vani King DO  Primary Care Provider: Tony Sadler MD    Reason for Consult: anjalisasimran    SUBJECTIVE:     HPI: 73F fallowed with Dr. Martinez from nephrology for advanced CKD stage 4-5 getting admitted with fluid overload. Pt had PD catheter insertion 2 weeks ago in preparation for initiation of dialysis, however had post-operative issues with increased leakage. Then her PD in initiation and training was delayed due to insurance issues. She has oliguria/increased weight gain in past 2 weeks, SOB on exertion and cough. Symptoms got worse and she came to ER and got admitted.     I saw her in the ER and recommend trial of lasix gttcassia. Will consult Dr. Catalan to evaluate patient and make recommendation for catheter care - we probably will not be able to use it for now for dialysis.    6/19 VSS. Only 175cc UO documented, but may be inaccurately charted - pt boarding in ER. Will increase lasix gtt to 10mg/hr and ensure at least q shift UO documentation.    ASSESSMENT/PLAN:     CKD stage 5  Volume overload  CHF exacerbation  Uremia  PD cathether in place  No NSAIDs, ACEI/ARB, IV contrast or other nephrotoxins.  Keep MAP > 60, SBP > 100.  Dose meds for GFR < 30 ml/min.  Renal diet - low K, low phos.  Lasix gttcassia, if not working consider hemodialysis or PD.    Anemia of CKD  Hgb and HCT are acceptable. Monitor for now.  Will provide PONCE and/or IV iron as needed to keep Hgb 8-10 range.    MBD / Secondary HPT  Ca, Phos, PTH and vitamin D levels are acceptable.   Phos binders, vitamin D and analogues, calcimimetics will be given as needed.    HTN  Tolerate asymptomatic HTN up to -160.  Continue home meds.  Low sodium diet as tolerated.    Thank you for allowing us to participate  "in the care of your patient!   We will follow the patient and provide recommendations as needed.         Laboratory:  Recent Labs   Lab 06/18/24  1241 06/19/24  0443    141   K 4.3 4.6    106   CO2 26 27   BUN 93* 88*   CREATININE 4.1* 4.2*   * 163*       Recent Labs   Lab 06/18/24  1241 06/19/24  0443   CALCIUM 9.6 9.6   ALBUMIN  --  3.4*   MG  --  2.5       Recent Labs   Lab 10/10/23  1113 01/22/24  1133 05/08/24  1139   PTH, Intact 583.1 H 506.7 H 291.7 H       No results for input(s): "POCTGLUCOSE" in the last 168 hours.    Recent Labs   Lab 07/05/23  1533 01/22/24  1133 06/19/24  0443   Hemoglobin A1C 5.6 5.4 6.8 H       Recent Labs   Lab 06/18/24  1239 06/19/24  0443   WBC 10.03 9.74   HGB 12.5 12.3   HCT 42.7 42.1    228   MCV 86 88   MCHC 29.3* 29.2*   MONO 8.4  0.8 8.8  0.9   EOSINOPHIL 1.6 1.0       Recent Labs   Lab 06/19/24  0443   BILITOT 0.8   PROT 6.1   ALBUMIN 3.4*   ALKPHOS 84   ALT 16   AST 14       Recent Labs   Lab 03/17/22  2240 04/07/22  1536 04/17/23  1448 07/14/23  2026 01/22/24  1129 03/14/24  2254 05/09/24  1029   Color, UA Colorless A   < > Colorless A  --   --  Yellow Yellow   Appearance, UA Clear   < > Clear  --   --  Clear Clear   pH, UA 5.0   < > 6.0  --   --  6.0 6.0   Specific [place], UA 1.010   < > 1.010  --   --  1.010 1.015   Protein, UA Negative   < > 1+ A  --   --  1+ A 1+ A   Glucose, UA Negative   < > Negative  --   --  Negative Negative   Ketones, UA Negative   < > Negative  --   --  Negative Negative   Urobilinogen, UA Negative  --   --   --   --  Negative  --    Bilirubin (UA) Negative   < > Negative  --   --  Negative Negative   Occult Blood UA Negative   < > Negative  --   --  TRACE Negative   Nitrite, UA Negative   < > Negative  --   --  Negative Negative   RBC, UA  --    < > 3   < > 1 3 1   WBC, UA  --    < > 3   < > 1 5 1   Bacteria  --    < > None  --  Occasional None Rare   Hyaline Casts, UA  --    < > 0  --   --  0 0    < > = values in " this interval not displayed.             Microbiology Results (last 7 days)       ** No results found for the last 168 hours. **            Review of patient's allergies indicates:   Allergen Reactions    Percocet [oxycodone-acetaminophen] Itching    Amiodarone analogues      Itching      Irbesartan Swelling    Januvia [sitagliptin]     Jardiance [empagliflozin]      Leg cramps    Lipitor [atorvastatin] Other (See Comments)     Severe leg pain    Linaclotide Other (See Comments) and Nausea And Vomiting     Does not remember    Lubiprostone Other (See Comments) and Palpitations     Does not remember       Outpatient meds:  No current facility-administered medications on file prior to encounter.     Current Outpatient Medications on File Prior to Encounter   Medication Sig Dispense Refill    allopurinoL (ZYLOPRIM) 300 MG tablet Take 1 tablet (300 mg total) by mouth once daily. 90 tablet 3    apixaban (ELIQUIS) 5 mg Tab Take 1 tablet (5 mg total) by mouth 2 (two) times daily. 180 tablet 2    calcitRIOL (ROCALTROL) 0.5 MCG Cap Take 0.5 mcg by mouth once daily.      carvediloL (COREG) 25 MG tablet Take 1 tablet (25 mg total) by mouth 2 (two) times daily. 180 tablet 2    cloNIDine (CATAPRES) 0.1 MG tablet Take 1 tablet (0.1 mg total) by mouth 3 (three) times daily as needed (PRN SBP > 165 mmHg). 90 tablet 6    cloNIDine 0.1 mg/24 hr td ptwk (CATAPRES) 0.1 mg/24 hr Place 1 patch onto the skin every 7 days. 4 patch 4    cyanocobalamin (VITAMIN B-12) 100 MCG tablet Take 100 mcg by mouth once daily.      evolocumab (REPATHA SURECLICK) 140 mg/mL PnIj Inject 1 mL (140 mg total) into the skin every 14 (fourteen) days. 2 mL 11    furosemide (LASIX) 40 MG tablet Take 40 mg by mouth once daily.      loratadine (CLARITIN) 10 mg tablet Take 10 mg by mouth once daily.      magnesium oxide (MAG-OX) 400 mg (241.3 mg magnesium) tablet Take 1 tablet (400 mg total) by mouth daily as needed (cramping). 30 tablet 0    amLODIPine (NORVASC) 10  MG tablet Take 10 mg by mouth once daily.      blood sugar diagnostic (TRUE METRIX GLUCOSE TEST STRIP) Strp Use 1x daily. Insurance preferred. 100 strip 3    blood sugar diagnostic Strp To check BG 1 time daily, to use with insurance preferred meter 100 strip 3    lancets Misc To check BG 1 times daily, to use with insurance preferred meter 100 each 3    nitroGLYCERIN (NITROSTAT) 0.4 MG SL tablet Place 1 tablet (0.4 mg total) under the tongue every 5 (five) minutes as needed for Chest pain. 25 tablet 0    [DISCONTINUED] aspirin (ECOTRIN) 81 MG EC tablet Take 1 tablet (81 mg total) by mouth once daily. 90 tablet 3    [DISCONTINUED] DULoxetine (CYMBALTA) 20 MG capsule TAKE ONE CAPSULE BY MOUTH ONCE DAILY 30 capsule 4    [DISCONTINUED] folic acid (FOLVITE) 1 MG tablet Take 1 mg by mouth once daily.      [DISCONTINUED] metFORMIN (GLUCOPHAGE-XR) 500 MG ER 24hr tablet Take 2 tablets (1,000 mg total) by mouth 2 (two) times daily with meals. 360 tablet 3    [DISCONTINUED] sodium bicarbonate 650 MG tablet Take 1 tablet (650 mg total) by mouth once daily. 30 tablet 11       Scheduled meds:   carvediloL  25 mg Oral BID    cloNIDine 0.1 mg/24 hr td ptwk  1 patch Transdermal Q7 Days    enoxparin  1 mg/kg Subcutaneous Q24H (treatment, non-standard time)    famotidine  20 mg Oral Daily       Infusions:   furosemide (LASIX) 2 mg/mL continuous infusion (non-titrating)  10 mg/hr Intravenous Continuous 2.5 mL/hr at 06/19/24 0719 5 mg/hr at 06/19/24 0719       PRN meds:    Current Facility-Administered Medications:     acetaminophen, 650 mg, Oral, Q8H PRN    aluminum-magnesium hydroxide-simethicone, 30 mL, Oral, QID PRN    dextrose 50%, 12.5 g, Intravenous, PRN    dextrose 50%, 25 g, Intravenous, PRN    glucagon (human recombinant), 1 mg, Intramuscular, PRN    glucose, 16 g, Oral, PRN    glucose, 24 g, Oral, PRN    hydrALAZINE, 10 mg, Intravenous, Q6H PRN    HYDROcodone-acetaminophen, 1 tablet, Oral, Q6H PRN    insulin aspart U-100,  "0-10 Units, Subcutaneous, QID (AC + HS) PRN    melatonin, 6 mg, Oral, Nightly PRN    ondansetron, 4 mg, Intravenous, Q6H PRN    Past Medical History:   Diagnosis Date    Anticoagulant long-term use     Arthritis     Breast cancer 2014    invasive lobular carcinoma    Cancer of kidney 11/2020    RIGHT KIDNEY CANCER    CHF (congestive heart failure)     Coronary artery disease dx 2005    Depression     Diabetes mellitus     Diastolic heart failure secondary to hypertension     Gout     Hyperlipemia     Hypertension     Hypertrophy of nasal turbinates     Kidney mass 2020    Right    Levoscoliosis     Lung nodule     left    Multiple thyroid nodules     NICOLE (obstructive sleep apnea)     uses C-PAP    Pulmonary hypertension      Past Surgical History:   Procedure Laterality Date    AORTOGRAPHY N/A 12/04/2020    Procedure: Aortogram;  Surgeon: Paul Pedersen MD;  Location: [facility] CATH;  Service: Cardiology;  Laterality: N/A;    BREAST SURGERY      CARDIAC CATHETERIZATION  12/2020    CHOLECYSTECTOMY      COLONOSCOPY      multi -last 2014     CORONARY ARTERY BYPASS GRAFT      ESOPHAGOGASTRODUODENOSCOPY      2012     HAND SURGERY Right     INSERTION, CATHETER, DIALYSIS, PERITONEAL N/A 6/4/2024    Procedure: IN Insertion Catheter, Dialysis, Peritoneal - laparoscopic;  Surgeon: Rodriguez Catalan Jr., MD;  Location: [facility] OR;  Service: General;  Laterality: N/A;    LAPAROSCOPIC ROBOT-ASSISTED SURGICAL REMOVAL OF KIDNEY USING DA CHELLE XI Right 03/10/2022    Procedure: XI ROBOTIC NEPHRECTOMY- radical;  Surgeon: Rolando Ramirez MD;  Location: [facility] OR;  Service: Urology;  Laterality: Right;    MASTECTOMY W/ SENTINEL NODE BIOPSY Bilateral 01/21/2015    bilateral "dog ears"    NASAL SINUS SURGERY  2015    Dr Bryant FESS/cauterization turbinate     PARTIAL HYSTERECTOMY  1989    PERCUTANEOUS TRANSLUMINAL BALLOON ANGIOPLASTY OF CORONARY ARTERY  12/04/2020    Procedure: Angioplasty-coronary;  Surgeon: Paul Pedersen MD;  Location: " [facility] CATH;  Service: Cardiology;;    RENAL BIOPSY Right     2021 EJ    TUBAL LIGATION      ULTRASOUND GUIDANCE  2020    Procedure: Ultrasound Guidance;  Surgeon: Paul Pedersen MD;  Location: [facility];  Service: Cardiology;;     Family History   Problem Relation Name Age of Onset    Breast cancer Mother      Stroke Father Waqar Phoenix     Hypertension Father Waqar Phoenix     Hepatitis Brother      Asthma Daughter Nell Taylor     Birth defects Daughter Nell Camejo's two children has cleft lips    Depression Daughter Nell Taylor     Drug abuse Bam Taylor     Learning disabilities Bam Taylor     Mental illness Bam Taylor     Breast cancer Maternal Aunt      Glaucoma Sister      Drug abuse Daughter Nell     Macular degeneration Neg Hx      Retinal detachment Neg Hx       Social History     Tobacco Use    Smoking status: Former     Current packs/day: 0.00     Types: Cigarettes     Start date: 2016     Quit date: 2016     Years since quittin.4    Smokeless tobacco: Never    Tobacco comments:     quit    Substance Use Topics    Alcohol use: No    Drug use: No       OBJECTIVE:     Vital Signs and IO:  Temp:  [98 °F (36.7 °C)-98.6 °F (37 °C)]   Pulse:  []   Resp:  [19-25]   BP: (128-194)/()   SpO2:  [89 %-97 %]   I/O last 3 completed shifts:  In: -   Out: 950 [Urine:950]  Wt Readings from Last 5 Encounters:   24 132.5 kg (292 lb)   24 132.5 kg (292 lb 1.8 oz)   24 134.3 kg (296 lb)   24 134.3 kg (296 lb)   24 131.7 kg (290 lb 4.8 oz)     Body mass index is 45.73 kg/m².    Physical Exam  Constitutional:       General: Patient is not in acute distress.     Appearance: Patient is well-developed. She is not diaphoretic.   HENT:      Head: Normocephalic and atraumatic.      Mouth/Throat: Mucous membranes are moist.   Eyes:      General: No scleral icterus.     Pupils: Pupils are equal,  round, and reactive to light.   Cardiovascular:      Rate and Rhythm: Normal rate and regular rhythm.   Pulmonary:      Effort: Pulmonary effort is normal. No respiratory distress.      Breath sounds: No stridor.   Abdominal:      General: There is no distension.      Palpations: Abdomen is soft.   Musculoskeletal:         General: No deformity. Normal range of motion.      Cervical back: Neck supple.   Skin:     General: Skin is warm and dry.      Findings: No rash present. No erythema.   Neurological:      Mental Status: Patient is alert and oriented to person, place, and time.      Cranial Nerves: No cranial nerve deficit.   Psychiatric:         Behavior: Behavior normal.          Patient care time was spent personally by me on the following activities:     Obtaining a history.  Examination of patient.  Providing medical care at the patients bedside.  Developing a treatment plan with patient or surrogate and bedside caregivers.  Ordering and reviewing laboratory studies, radiographic studies, pulse oximetry.  Ordering and performing treatments and interventions.  Evaluation of patient's response to treatment.  Discussions with consultants while on the unit and immediately available to the patient.  Re-evaluation of the patient's condition.  Documentation in the medical record.     Paolo Martinez MD    [facility]  [address] [place] [id number]    [phone] - tel  [phone] - fax    6/19/2024

## 2024-06-19 NOTE — PT/OT/SLP EVAL
Physical Therapy Evaluation    Patient Name:  Jhui Taylor   MRN:  [id number]    Recommendations:     Discharge Recommendations: High Intensity Therapy (vs Low pending progress)   Discharge Equipment Recommendations: to be determined by next level of care   Barriers to discharge:  pt currently with complicated medical status    Assessment:     Juhi Taylor is a 73 y.o. female admitted with a medical diagnosis of Fluid overload.  She presents with the following impairments/functional limitations: weakness, impaired endurance, impaired functional mobility, gait instability, impaired balance, decreased lower extremity function, pain, edema, impaired skin, impaired cardiopulmonary response to activity.    Pt found HOB elevated on stretcher in ED and agreeable to working with PT due to discomfort from being in one position too long. Pt A & O x  4 and has the following co-morbidities: CAD w/ CABG, HTN, DM, A-Fib, CKD, Renal Cell CA.   Pt tolerated session only fairly due to abdominal pain in area of recently placed catheter for PD which has not yet begun. Pt required moderate to minimum assistance for safe mobilization during session today, limited by pain and shortness of breath. Pt would benefit from acute PT during hospitalization to increase strength, endurance and safety with mobility. Depending on progress pt may benefit from High Intensity Therapy prior to discharge home due to decline in functional status secondary to recent surgery. The pt presents with fair participation and good motivation to return to prior level of function with High Intensity Therapy.      Rehab Prognosis: Fair; patient would benefit from acute skilled PT services to address these deficits and reach maximum level of function.    Recent Surgery: * No surgery found *      Plan:     During this hospitalization, patient to be seen 6 x/week to address the identified rehab impairments via gait training, therapeutic activities,  therapeutic exercises and progress toward the following goals:    Plan of Care Expires:  07/19/24    Subjective     Chief Complaint: pain in abdomen since recent surgery for catheter placement  Patient/Family Comments/goals: return to prior level of independent function.   Pain/Comfort:  Pain Rating 1: 4/10  Location 1: abdomen  Pain Addressed 1: Reposition, Distraction, Cessation of Activity, Nurse notified  Pain Rating Post-Intervention 1: 4/10  Pain Rating 2: 7/10  Location 2: knee (& back while walking)  Pain Addressed 2: Reposition, Distraction, Cessation of Activity, Nurse notified    Patients cultural, spiritual, Scientologist conflicts given the current situation:      Living Environment:  Pt lives with one of her daughters and a grandson.  Prior to admission, patients level of function was MI with rollator for short distance mobility at household level. Pt is not on home O2.  Equipment used at home: rollator, cane, straight.  DME owned (not currently used): none.  Upon discharge, patient will have assistance from facility staff and her family.    Objective:     Communicated with NEENA Braxton prior to session.  Patient found HOB elevated with blood pressure cuff, antony catheter, oxygen, peripheral IV, pulse ox (continuous), telemetry  upon PT entry to room.    General Precautions: Standard, fall  Orthopedic Precautions:N/A   Braces: N/A  Respiratory Status: Nasal cannula, flow 2 L/min    Exams:  Cognitive Exam:  Patient is oriented to Person, Place, Time, and Situation  RLE ROM: WFL's but currently limited by edema  RLE Strength: NT  LLE ROM: WFL's, but currently limited by edema  LLE Strength: NT    Functional Mobility:  Bed Mobility:     Scooting: minimum assistance and in sitting  Supine to Sit: moderate assistance  Transfers:     Sit to Stand:  minimum assistance and moderate assistance with rolling walker, but only stood a few seconds due to SOB and in pain      AM-PAC 6 CLICK MOBILITY  Total  Score:16       Treatment & Education:  Pt was educated on the following: call light use, importance of OOB activity and functional mobility to negate the negative effects of prolonged bed rest during this hospitalization, safe transfers/ambulation and discharge planning recommendations/options.      Patient left  sitting EOB with daughter present and RN notified  with all lines intact and call button in reach.    GOALS:   Multidisciplinary Problems       Physical Therapy Goals          Problem: Physical Therapy    Goal Priority Disciplines Outcome Goal Variances Interventions   Physical Therapy Goal     PT, PT/OT      Description: Goals to be met by: 24     Patient will increase functional independence with mobility by performin. Supine to sit with Supervision  2. Sit to stand transfer with Supervision  3. Bed to chair transfer with Supervision using Rolling Walker  4. Gait  x 150 feet with Supervision using Rolling Walker.                              History:     Past Medical History:   Diagnosis Date    Anticoagulant long-term use     Arthritis     Breast cancer     invasive lobular carcinoma    Cancer of kidney 2020    RIGHT KIDNEY CANCER    CHF (congestive heart failure)     Coronary artery disease dx     Depression     Diabetes mellitus     Diastolic heart failure secondary to hypertension     Gout     Hyperlipemia     Hypertension     Hypertrophy of nasal turbinates     Kidney mass     Right    Levoscoliosis     Lung nodule     left    Multiple thyroid nodules     NICOLE (obstructive sleep apnea)     uses C-PAP    Pulmonary hypertension        Past Surgical History:   Procedure Laterality Date    AORTOGRAPHY N/A 2020    Procedure: Aortogram;  Surgeon: Paul Pedersen MD;  Location: [facility] CATH;  Service: Cardiology;  Laterality: N/A;    BREAST SURGERY      CARDIAC CATHETERIZATION  2020    CHOLECYSTECTOMY      COLONOSCOPY      multi -last      CORONARY ARTERY BYPASS GRAFT       "ESOPHAGOGASTRODUODENOSCOPY      2012     HAND SURGERY Right     INSERTION, CATHETER, DIALYSIS, PERITONEAL N/A 6/4/2024    Procedure: IN Insertion Catheter, Dialysis, Peritoneal - laparoscopic;  Surgeon: Rodriguez Catalan Jr., MD;  Location: [facility] OR;  Service: General;  Laterality: N/A;    LAPAROSCOPIC ROBOT-ASSISTED SURGICAL REMOVAL OF KIDNEY USING DA CHELLE XI Right 03/10/2022    Procedure: XI ROBOTIC NEPHRECTOMY- radical;  Surgeon: Rolando Ramirez MD;  Location: [facility] OR;  Service: Urology;  Laterality: Right;    MASTECTOMY W/ SENTINEL NODE BIOPSY Bilateral 01/21/2015    bilateral "dog ears"    NASAL SINUS SURGERY  2015    Dr Bryant FESS/cauterization turbinate     PARTIAL HYSTERECTOMY  1989    PERCUTANEOUS TRANSLUMINAL BALLOON ANGIOPLASTY OF CORONARY ARTERY  12/04/2020    Procedure: Angioplasty-coronary;  Surgeon: Paul Pedersen MD;  Location: [facility] CATH;  Service: Cardiology;;    RENAL BIOPSY Right     9/20/2021 EJ    TUBAL LIGATION      ULTRASOUND GUIDANCE  12/04/2020    Procedure: Ultrasound Guidance;  Surgeon: Paul Pedersen MD;  Location: [facility] CATH;  Service: Cardiology;;       Time Tracking:     PT Received On: 06/19/24  PT Start Time: 1404     PT Stop Time: 1427  PT Total Time (min): 23 min     Billable Minutes: Evaluation 10 and Therapeutic Activity 13      06/19/2024  "

## 2024-06-19 NOTE — ASSESSMENT & PLAN NOTE
Likely multifactorial from CKD and CHF  Questionable CAP, follow up on procal  Placed on antibiotics for now  Follow up on echo  Continue iv Furosemide drip, if not working consider hemodialysis or PD.   Nephrology is following

## 2024-06-19 NOTE — PLAN OF CARE
Goals to be met by: 24     Patient will increase functional independence with mobility by performin. Supine to sit with Supervision  2. Sit to stand transfer with Supervision  3. Bed to chair transfer with Supervision using Rolling Walker  4. Gait  x 150 feet with Supervision using Rolling Walker.

## 2024-06-19 NOTE — PROGRESS NOTES
[facility] - Emergency Dept  Hospital Medicine  Progress Note    Patient Name: Juhi Taylor  MRN: [id number]  Patient Class: IP- Inpatient   Admission Date: 6/18/2024  Length of Stay: 0 days  Attending Physician: Vani King DO  Primary Care Provider: Tony Sadler MD        Subjective:     Principal Problem:Acute hypoxic respiratory failure        HPI:  73 year old pt getting admitted with fluid overload in need of dialysis  Pt had PD catheter insertion 2 weeks ago  She was supposed to start on PD for ESRD but got delayed due to insurance issues  She has oliguria/increased weight gain in past 2 weeks  Also SOB on exertion and cough  Symptoms got worse and she came to ER and got admitted  Pt was started on lasix gtt iv         Overview/Hospital Course:  Patient is a 73-year-old female who was admitted for acute hypoxic respiratory failure likely multifactorial from CKD and CHF .  Patient had PD cath placed 2 weeks ago however due to insurance situation never received dialysis.  Patient was started on IV Lasix and if not working planned for hemodialysis or PD.  Patient had questionable CPAP and was temporarily placed on antibiotics.  Echocardiogram ordered and nephrology followed the patient during the hospital stay.         Interval History:  Patient said she is not feeling well and she feels that she has pneumonia and she would really like some antibiotics    Review of Systems   All other systems reviewed and are negative.    Objective:     Vital Signs (Most Recent):  Temp: 98.2 °F (36.8 °C) (06/19/24 1136)  Pulse: 105 (06/19/24 1200)  Resp: (!) 27 (06/19/24 1200)  BP: (!) 152/102 (06/19/24 1200)  SpO2: (!) 93 % (06/19/24 1200) Vital Signs (24h Range):  Temp:  [98 °F (36.7 °C)-98.2 °F (36.8 °C)] 98.2 °F (36.8 °C)  Pulse:  [] 105  Resp:  [19-27] 27  SpO2:  [89 %-97 %] 93 %  BP: (128-194)/() 152/102     Weight: 132.5 kg (292 lb)  Body mass index is 45.73  kg/m².    Intake/Output Summary (Last 24 hours) at 6/19/2024 1633  Last data filed at 6/19/2024 1300  Gross per 24 hour   Intake 240 ml   Output 1125 ml   Net -885 ml         Physical Exam  Constitutional:       Appearance: She is obese.   Cardiovascular:      Rate and Rhythm: Normal rate.   Pulmonary:      Breath sounds: Wheezing and rales present.   Musculoskeletal:         General: Swelling present.   Neurological:      Mental Status: She is oriented to person, place, and time.             Significant Labs: All pertinent labs within the past 24 hours have been reviewed.    Significant Imaging: I have reviewed all pertinent imaging results/findings within the past 24 hours.    Assessment/Plan:      * Acute hypoxic respiratory failure  Likely multifactorial from CKD and CHF  Questionable CAP, follow up on procal  Placed on antibiotics for now  Follow up on echo  Continue iv Furosemide drip, if not working consider hemodialysis or PD.   Nephrology is following      Chronic kidney disease (CKD), stage V  Patient had PD cath placed 2 weeks ago however due to insurance situation never received dialysis  Lasix gtt, antony, if not working consider hemodialysis or PD.       Renal cell carcinoma of right kidney  Aware       Paroxysmal atrial fibrillation  Chronic stable issue  Holding home apixaban for now      Coronary artery disease  Stable  Cardiologist is Dr Pedersen    Essential hypertension  Chronic, controlled. Latest blood pressure and vitals reviewed-     Temp:  [98.6 °F (37 °C)]   Pulse:  [104-106]   Resp:  [20-22]   BP: (138-162)/()   SpO2:  [89 %-97 %] .   Home meds for hypertension were reviewed and noted below.   Hypertension Medications               carvediloL (COREG) 25 MG tablet Take 1 tablet (25 mg total) by mouth 2 (two) times daily.    cloNIDine (CATAPRES) 0.1 MG tablet Take 1 tablet (0.1 mg total) by mouth 3 (three) times daily as needed (PRN SBP > 165 mmHg).    cloNIDine 0.1 mg/24 hr td ptwk  "(CATAPRES) 0.1 mg/24 hr Place 1 patch onto the skin every 7 days.    furosemide (LASIX) 40 MG tablet Take 40 mg by mouth once daily.    amLODIPine (NORVASC) 10 MG tablet Take 10 mg by mouth once daily.    nitroGLYCERIN (NITROSTAT) 0.4 MG SL tablet Place 1 tablet (0.4 mg total) under the tongue every 5 (five) minutes as needed for Chest pain.            While in the hospital, will manage blood pressure as follows; Continue home antihypertensive regimen    Will utilize p.r.n. blood pressure medication only if patient's blood pressure greater than 140/90 and she develops symptoms such as worsening chest pain or shortness of breath.    Type 2 diabetes mellitus with hyperglycemia  Patient's FSGs are controlled on current medication regimen.  Last A1c reviewed-   Lab Results   Component Value Date    HGBA1C 5.4 01/22/2024     Most recent fingerstick glucose reviewed- No results for input(s): "POCTGLUCOSE" in the last 24 hours.  Current correctional scale  Medium  Maintain anti-hyperglycemic dose as follows-   Antihyperglycemics (From admission, onward)      Start     Stop Route Frequency Ordered    06/18/24 1619  insulin aspart U-100 pen 0-10 Units         -- SubQ Before meals & nightly PRN 06/18/24 1519          Hold Oral hypoglycemics while patient is in the hospital.    Morbid obesity  Body mass index is 45.73 kg/m². Morbid obesity complicates all aspects of disease management from diagnostic modalities to treatment.       VTE Risk Mitigation (From admission, onward)           Ordered     enoxaparin injection 135 mg  Every 24 hours         06/18/24 1832     IP VTE HIGH RISK PATIENT  Once         06/18/24 1519     Place sequential compression device  Until discontinued         06/18/24 1519                    Discharge Planning   HARINI:      Code Status: Full Code   Is the patient medically ready for discharge?:     Reason for patient still in hospital (select all that apply): Patient trending condition  Discharge Plan A: " Home with family                  Vani King DO  Department of Hospital Medicine   [facility] - Emergency Dept

## 2024-06-20 PROBLEM — I50.9 CHF (CONGESTIVE HEART FAILURE): Status: ACTIVE | Noted: 2024-06-20

## 2024-06-20 LAB
ALBUMIN SERPL BCP-MCNC: 3.2 G/DL (ref 3.5–5.2)
ALP SERPL-CCNC: 76 U/L (ref 55–135)
ALT SERPL W/O P-5'-P-CCNC: 15 U/L (ref 10–44)
ANION GAP SERPL CALC-SCNC: 9 MMOL/L (ref 8–16)
AORTIC ROOT ANNULUS: 3.7 CM
AORTIC VALVE CUSP SEPERATION: 2.4 CM
APICAL FOUR CHAMBER EJECTION FRACTION: 45 %
ASCENDING AORTA: 3.3 CM
AST SERPL-CCNC: 10 U/L (ref 10–40)
AV INDEX (PROSTH): 0.73
AV MEAN GRADIENT: 4 MMHG
AV PEAK GRADIENT: 7 MMHG
AV VALVE AREA BY VELOCITY RATIO: 2.51 CM²
AV VALVE AREA: 2.3 CM²
AV VELOCITY RATIO: 0.8
BASOPHILS # BLD AUTO: 0.02 K/UL (ref 0–0.2)
BASOPHILS NFR BLD: 0.2 % (ref 0–1.9)
BILIRUB SERPL-MCNC: 0.7 MG/DL (ref 0.1–1)
BNP SERPL-MCNC: 1726 PG/ML (ref 0–99)
BSA FOR ECHO PROCEDURE: 2.5 M2
BUN SERPL-MCNC: 103 MG/DL (ref 8–23)
CALCIUM SERPL-MCNC: 9.6 MG/DL (ref 8.7–10.5)
CHLORIDE SERPL-SCNC: 105 MMOL/L (ref 95–110)
CO2 SERPL-SCNC: 27 MMOL/L (ref 23–29)
CREAT SERPL-MCNC: 4.3 MG/DL (ref 0.5–1.4)
CV ECHO LV RWT: 0.43 CM
DIFFERENTIAL METHOD BLD: ABNORMAL
DOP CALC AO PEAK VEL: 1.29 M/S
DOP CALC AO VTI: 18.6 CM
DOP CALC LVOT AREA: 3.1 CM2
DOP CALC LVOT DIAMETER: 2 CM
DOP CALC LVOT PEAK VEL: 1.03 M/S
DOP CALC LVOT STROKE VOLUME: 42.7 CM3
DOP CALC MV VTI: 33.4 CM
DOP CALCLVOT PEAK VEL VTI: 13.6 CM
E WAVE DECELERATION TIME: 137 MSEC
E/E' RATIO: 11.33 M/S
ECHO LV POSTERIOR WALL: 1.5 CM (ref 0.6–1.1)
EOSINOPHIL # BLD AUTO: 0 K/UL (ref 0–0.5)
EOSINOPHIL NFR BLD: 0.2 % (ref 0–8)
ERYTHROCYTE [DISTWIDTH] IN BLOOD BY AUTOMATED COUNT: 18.8 % (ref 11.5–14.5)
EST. GFR  (NO RACE VARIABLE): 10.5 ML/MIN/1.73 M^2
FRACTIONAL SHORTENING: 20 % (ref 28–44)
GLUCOSE SERPL-MCNC: 158 MG/DL (ref 70–110)
GLUCOSE SERPL-MCNC: 172 MG/DL (ref 70–110)
GLUCOSE SERPL-MCNC: 180 MG/DL (ref 70–110)
GLUCOSE SERPL-MCNC: 224 MG/DL (ref 70–110)
GLUCOSE SERPL-MCNC: 253 MG/DL (ref 70–110)
HCT VFR BLD AUTO: 40.6 % (ref 37–48.5)
HGB BLD-MCNC: 11.7 G/DL (ref 12–16)
IMM GRANULOCYTES # BLD AUTO: 0.04 K/UL (ref 0–0.04)
IMM GRANULOCYTES NFR BLD AUTO: 0.4 % (ref 0–0.5)
INTERVENTRICULAR SEPTUM: 1.4 CM (ref 0.6–1.1)
IVC DIAMETER: 2.5 CM
LEFT ATRIUM AREA SYSTOLIC (APICAL 4 CHAMBER): 23.3 CM2
LEFT ATRIUM SIZE: 4.3 CM
LEFT INTERNAL DIMENSION IN SYSTOLE: 5.5 CM (ref 2.1–4)
LEFT VENTRICLE DIASTOLIC VOLUME INDEX: 103.78 ML/M2
LEFT VENTRICLE DIASTOLIC VOLUME: 247 ML
LEFT VENTRICLE END DIASTOLIC VOLUME APICAL 4 CHAMBER: 184 ML
LEFT VENTRICLE END SYSTOLIC VOLUME APICAL 4 CHAMBER: 59.9 ML
LEFT VENTRICLE MASS INDEX: 214 G/M2
LEFT VENTRICLE SYSTOLIC VOLUME INDEX: 61.8 ML/M2
LEFT VENTRICLE SYSTOLIC VOLUME: 147 ML
LEFT VENTRICULAR INTERNAL DIMENSION IN DIASTOLE: 6.9 CM (ref 3.5–6)
LEFT VENTRICULAR MASS: 510.35 G
LV LATERAL E/E' RATIO: 7.93 M/S
LV SEPTAL E/E' RATIO: 19.83 M/S
LVED V (TEICH): 247 ML
LVES V (TEICH): 147 ML
LVOT MG: 2 MMHG
LVOT MV: 0.66 CM/S
LYMPHOCYTES # BLD AUTO: 1.5 K/UL (ref 1–4.8)
LYMPHOCYTES NFR BLD: 14.4 % (ref 18–48)
MAGNESIUM SERPL-MCNC: 2.5 MG/DL (ref 1.6–2.6)
MCH RBC QN AUTO: 24.9 PG (ref 27–31)
MCHC RBC AUTO-ENTMCNC: 28.8 G/DL (ref 32–36)
MCV RBC AUTO: 87 FL (ref 82–98)
MONOCYTES # BLD AUTO: 0.8 K/UL (ref 0.3–1)
MONOCYTES NFR BLD: 7.4 % (ref 4–15)
MR PISA EROA: 0.04 CM2
MV MEAN GRADIENT: 3 MMHG
MV PEAK E VEL: 1.19 M/S
MV PEAK GRADIENT: 8 MMHG
MV STENOSIS PRESSURE HALF TIME: 105 MS
MV VALVE AREA BY CONTINUITY EQUATION: 1.28 CM2
MV VALVE AREA P 1/2 METHOD: 2.1 CM2
NEUTROPHILS # BLD AUTO: 8.3 K/UL (ref 1.8–7.7)
NEUTROPHILS NFR BLD: 77.4 % (ref 38–73)
NRBC BLD-RTO: 0 /100 WBC
OHS CV RV/LV RATIO: 0.45 CM
PISA MRMAX VEL: 3.87 M/S
PISA RADIUS: 0.3 CM
PISA TR MAX VEL: 3.21 M/S
PISA VN NYQUIST MS: 0.3 M/S
PISA VN NYQUIST: 0.3 M/S
PLATELET # BLD AUTO: 216 K/UL (ref 150–450)
PMV BLD AUTO: 10 FL (ref 9.2–12.9)
POTASSIUM SERPL-SCNC: 4.6 MMOL/L (ref 3.5–5.1)
PROT SERPL-MCNC: 5.9 G/DL (ref 6–8.4)
PV MV: 0.71 M/S
PV PEAK GRADIENT: 5 MMHG
PV PEAK VELOCITY: 1.12 M/S
RA PRESSURE ESTIMATED: 15 MMHG
RBC # BLD AUTO: 4.69 M/UL (ref 4–5.4)
RIGHT ATRIUM VOLUME AREA LENGTH APICAL 4 CHAMBER: 59.7 ML
RIGHT VENTRICULAR END-DIASTOLIC DIMENSION: 3.1 CM
RV TB RVSP: 18 MMHG
RV TISSUE DOPPLER FREE WALL SYSTOLIC VELOCITY 1 (APICAL 4 CHAMBER VIEW): 12.2 CM/S
SODIUM SERPL-SCNC: 141 MMOL/L (ref 136–145)
TDI LATERAL: 0.15 M/S
TDI SEPTAL: 0.06 M/S
TDI: 0.11 M/S
TR MAX PG: 41 MMHG
TROPONIN I SERPL HS-MCNC: 31 PG/ML (ref 0–14.9)
TV REST PULMONARY ARTERY PRESSURE: 56 MMHG
WBC # BLD AUTO: 10.71 K/UL (ref 3.9–12.7)
Z-SCORE OF LEFT VENTRICULAR DIMENSION IN END DIASTOLE: -4.17
Z-SCORE OF LEFT VENTRICULAR DIMENSION IN END SYSTOLE: -1.04

## 2024-06-20 PROCEDURE — 25000003 PHARM REV CODE 250: Performed by: FAMILY MEDICINE

## 2024-06-20 PROCEDURE — 83880 ASSAY OF NATRIURETIC PEPTIDE: CPT | Performed by: INTERNAL MEDICINE

## 2024-06-20 PROCEDURE — 21400001 HC TELEMETRY ROOM

## 2024-06-20 PROCEDURE — 97165 OT EVAL LOW COMPLEX 30 MIN: CPT

## 2024-06-20 PROCEDURE — 84484 ASSAY OF TROPONIN QUANT: CPT | Performed by: INTERNAL MEDICINE

## 2024-06-20 PROCEDURE — 80053 COMPREHEN METABOLIC PANEL: CPT | Performed by: INTERNAL MEDICINE

## 2024-06-20 PROCEDURE — 63600175 PHARM REV CODE 636 W HCPCS: Performed by: INTERNAL MEDICINE

## 2024-06-20 PROCEDURE — 83735 ASSAY OF MAGNESIUM: CPT | Performed by: INTERNAL MEDICINE

## 2024-06-20 PROCEDURE — 27000221 HC OXYGEN, UP TO 24 HOURS

## 2024-06-20 PROCEDURE — 63700000 PHARM REV CODE 250 ALT 637 W/O HCPCS: Performed by: FAMILY MEDICINE

## 2024-06-20 PROCEDURE — 63600150 PHARM REV CODE 636: Performed by: INTERNAL MEDICINE

## 2024-06-20 PROCEDURE — 85025 COMPLETE CBC W/AUTO DIFF WBC: CPT | Performed by: INTERNAL MEDICINE

## 2024-06-20 PROCEDURE — 25000003 PHARM REV CODE 250: Performed by: INTERNAL MEDICINE

## 2024-06-20 PROCEDURE — 94761 N-INVAS EAR/PLS OXIMETRY MLT: CPT

## 2024-06-20 PROCEDURE — 97535 SELF CARE MNGMENT TRAINING: CPT

## 2024-06-20 PROCEDURE — 36415 COLL VENOUS BLD VENIPUNCTURE: CPT | Performed by: INTERNAL MEDICINE

## 2024-06-20 PROCEDURE — 63700000 PHARM REV CODE 250 ALT 637 W/O HCPCS: Performed by: INTERNAL MEDICINE

## 2024-06-20 RX ORDER — FLUCONAZOLE 100 MG/1
100 TABLET ORAL DAILY
Status: DISPENSED | OUTPATIENT
Start: 2024-06-21 | End: 2024-06-23

## 2024-06-20 RX ORDER — CLONIDINE HYDROCHLORIDE 0.1 MG/1
0.1 TABLET ORAL EVERY 6 HOURS PRN
Status: DISCONTINUED | OUTPATIENT
Start: 2024-06-20 | End: 2024-06-23

## 2024-06-20 RX ORDER — METOLAZONE 2.5 MG/1
5 TABLET ORAL DAILY
Status: DISCONTINUED | OUTPATIENT
Start: 2024-06-20 | End: 2024-06-23

## 2024-06-20 RX ADMIN — MUPIROCIN 1 G: 20 OINTMENT TOPICAL at 09:06

## 2024-06-20 RX ADMIN — FUROSEMIDE 15 MG/HR: 10 INJECTION, SOLUTION INTRAMUSCULAR; INTRAVENOUS at 11:06

## 2024-06-20 RX ADMIN — INSULIN ASPART 1 UNITS: 100 INJECTION, SOLUTION INTRAVENOUS; SUBCUTANEOUS at 08:06

## 2024-06-20 RX ADMIN — FUROSEMIDE 10 MG/HR: 10 INJECTION, SOLUTION INTRAMUSCULAR; INTRAVENOUS at 06:06

## 2024-06-20 RX ADMIN — INSULIN ASPART 4 UNITS: 100 INJECTION, SOLUTION INTRAVENOUS; SUBCUTANEOUS at 05:06

## 2024-06-20 RX ADMIN — CARVEDILOL 25 MG: 25 TABLET, FILM COATED ORAL at 08:06

## 2024-06-20 RX ADMIN — FLUCONAZOLE 100 MG: 100 TABLET ORAL at 09:06

## 2024-06-20 RX ADMIN — AZITHROMYCIN DIHYDRATE 500 MG: 250 TABLET ORAL at 09:06

## 2024-06-20 RX ADMIN — CARVEDILOL 25 MG: 25 TABLET, FILM COATED ORAL at 09:06

## 2024-06-20 RX ADMIN — FAMOTIDINE 20 MG: 20 TABLET ORAL at 09:06

## 2024-06-20 RX ADMIN — ENOXAPARIN SODIUM 135 MG: 150 INJECTION SUBCUTANEOUS at 08:06

## 2024-06-20 RX ADMIN — INSULIN ASPART 6 UNITS: 100 INJECTION, SOLUTION INTRAVENOUS; SUBCUTANEOUS at 12:06

## 2024-06-20 RX ADMIN — MUPIROCIN 1 G: 20 OINTMENT TOPICAL at 08:06

## 2024-06-20 RX ADMIN — METOLAZONE 5 MG: 2.5 TABLET ORAL at 04:06

## 2024-06-20 NOTE — CONSULTS
GENERAL SURGERY  INPATIENT CONSULT    REASON FOR CONSULT: Peritoneal dialysis catheter in place    HPI: Juhi Taylor is a 73 y.o. female known to me for placement of laparoscopic peritoneal dialysis catheter for anticipated need of dialysis due to progressing kidney disease and fluid overload status. Has been seen in clinic and had drainage of ascitic fluid around the PD catheter.  This was felt to be related to fluid overload status.  The patient has been waiting to be educated and started on outpatient PD catheterization however has not been able to get in with the nurse as of yet.  Presented with fluid overload status to the emergency room 2 days ago. She has had progressive shortness of breath on exertion, cough, lower extremity swelling. Has been he was started on diuresis.  Patient reports some improvement in her lower extremity swelling and breathing however still not significantly improved. She reports drainage around her peritoneal dialysis catheter has significantly decreased however. Has some tenderness around the exit site but no purulent drainage or redness.    I have reviewed the patient's chart including prior progress notes, procedures and testing.     ROS:   Review of Systems    PROBLEM LIST:  Patient Active Problem List   Diagnosis    Coronary artery disease    Hyperlipemia    Essential hypertension    Type 2 diabetes mellitus with microalbuminuria, without long-term current use of insulin    Morbid obesity with body mass index (BMI) greater than or equal to 50    Elevated blood pressure reading with diagnosis of hypertension    Lower leg edema    NICOLE (obstructive sleep apnea)    Non-rheumatic mitral regurgitation    Diastolic dysfunction    Statin intolerance    Thyroid nodule    Vitamin D deficiency    Hypothyroidism    Gout    Hyperparathyroidism    Goiter    Thyroiditis    Abnormal thyroid blood test    Nodular thyroid disease    Hyperuricemia    Type 2 diabetes mellitus with  hyperglycemia    Proteinuria    Right kidney mass    GERD (gastroesophageal reflux disease)    Pulmonary nodule    Congestive heart failure    SOB (shortness of breath)    S/P coronary artery stent placement    History of breast cancer in female    Left mastectomy site mass    Obesity, Class III, BMI 40-49.9 (morbid obesity)    Osteopenia    Postmenopausal    Pulmonary hypertension    Heart failure with preserved ejection fraction    Long term (current) use of antithrombotics/antiplatelets    Diastolic heart failure secondary to hypertension    Osteoarthritis of multiple joints    Vitamin D deficient osteomalacia    Hypoparathyroidism due to impaired secretion of parathyroid hormone (PTH)    Acute on chronic systolic CHF (congestive heart failure)    Elevated BUN    Long term current use of amiodarone    Paroxysmal atrial fibrillation    Situational mixed anxiety and depressive disorder    Iron deficiency anemia    Chronic back pain    Peripheral vascular disease, unspecified    Arthralgia of multiple joints    Drug side effects, initial encounter    Renal cell carcinoma of right kidney    Benign hypertensive heart and kidney disease with CHF, NYHA class 2 and CKD stage 4    CKD stage 4 secondary to hypertension    H/O right nephrectomy    Hypomagnesemia    Chronic kidney disease (CKD), stage V    Chronic combined systolic and diastolic heart failure    Chronic renal failure (CRF), stage 4 (severe)    Myalgia due to statin    Hyperparathyroidism, secondary renal    CKD (chronic kidney disease), stage IV    Acute hypoxic respiratory failure    Morbid obesity         HISTORY  Past Medical History:   Diagnosis Date    Anticoagulant long-term use     Arthritis     Breast cancer 2014    invasive lobular carcinoma    Cancer of kidney 11/2020    RIGHT KIDNEY CANCER    CHF (congestive heart failure)     Coronary artery disease dx 2005    Depression     Diabetes mellitus     Diastolic heart failure secondary to hypertension   "   Gout     Hyperlipemia     Hypertension     Hypertrophy of nasal turbinates     Kidney mass     Right    Levoscoliosis     Lung nodule     left    Multiple thyroid nodules     NICOLE (obstructive sleep apnea)     uses C-PAP    Pulmonary hypertension        Past Surgical History:   Procedure Laterality Date    AORTOGRAPHY N/A 2020    Procedure: Aortogram;  Surgeon: Paul Pedersen MD;  Location: [facility] CATH;  Service: Cardiology;  Laterality: N/A;    BREAST SURGERY      CARDIAC CATHETERIZATION  2020    CHOLECYSTECTOMY      COLONOSCOPY      multi -last      CORONARY ARTERY BYPASS GRAFT      ESOPHAGOGASTRODUODENOSCOPY      2012     HAND SURGERY Right     INSERTION, CATHETER, DIALYSIS, PERITONEAL N/A 2024    Procedure: IN Insertion Catheter, Dialysis, Peritoneal - laparoscopic;  Surgeon: Rodriguez Catalan Jr., MD;  Location: [facility] OR;  Service: General;  Laterality: N/A;    LAPAROSCOPIC ROBOT-ASSISTED SURGICAL REMOVAL OF KIDNEY USING DA CHELLE XI Right 03/10/2022    Procedure: XI ROBOTIC NEPHRECTOMY- radical;  Surgeon: Rolando Ramirez MD;  Location: [facility] OR;  Service: Urology;  Laterality: Right;    MASTECTOMY W/ SENTINEL NODE BIOPSY Bilateral 2015    bilateral "dog ears"    NASAL SINUS SURGERY      Dr Bryant FESS/cauterization turbinate     PARTIAL HYSTERECTOMY      PERCUTANEOUS TRANSLUMINAL BALLOON ANGIOPLASTY OF CORONARY ARTERY  2020    Procedure: Angioplasty-coronary;  Surgeon: Paul Pedersen MD;  [facility];  Service: Cardiology;;    RENAL BIOPSY Right     2021 EJ    TUBAL LIGATION      ULTRASOUND GUIDANCE  2020    Procedure: Ultrasound Guidance;  Surgeon: Paul Pedersen MD;  [facility]ATH;  Service: Cardiology;;       Social History     Tobacco Use    Smoking status: Former     Current packs/day: 0.00     Types: Cigarettes     Start date: 2016     Quit date: 2016     Years since quittin.4    Smokeless tobacco: Never    Tobacco " comments:     quit    Substance Use Topics    Alcohol use: No    Drug use: No       Family History   Problem Relation Name Age of Onset    Breast cancer Mother      Stroke Father Waqar Sr.     Hypertension Father Waqar Sr.     Hepatitis Brother      Asthma Daughter Nell Taylor     Birth defects Daughter Nell Camejo's two children has cleft lips    Depression Daughter Nell Taylor     Drug abuse Daughter Nell Taylor     Learning disabilities Daughter Nell Taylor     Mental illness Daughter Nell Taylor     Breast cancer Maternal Aunt      Glaucoma Sister      Drug abuse Daughter Nell     Macular degeneration Neg Hx      Retinal detachment Neg Hx           MEDS:  No current facility-administered medications on file prior to encounter.     Current Outpatient Medications on File Prior to Encounter   Medication Sig Dispense Refill    allopurinoL (ZYLOPRIM) 300 MG tablet Take 1 tablet (300 mg total) by mouth once daily. 90 tablet 3    amLODIPine (NORVASC) 10 MG tablet Take 10 mg by mouth once daily.      apixaban (ELIQUIS) 5 mg Tab Take 1 tablet (5 mg total) by mouth 2 (two) times daily. 180 tablet 2    calcitRIOL (ROCALTROL) 0.5 MCG Cap Take 0.5 mcg by mouth once daily.      carvediloL (COREG) 25 MG tablet Take 1 tablet (25 mg total) by mouth 2 (two) times daily. 180 tablet 2    cyanocobalamin (VITAMIN B-12) 100 MCG tablet Take 100 mcg by mouth once daily.      evolocumab (REPATHA SURECLICK) 140 mg/mL PnIj Inject 1 mL (140 mg total) into the skin every 14 (fourteen) days. 2 mL 11    furosemide (LASIX) 40 MG tablet Take 40 mg by mouth once daily.      loratadine (CLARITIN) 10 mg tablet Take 10 mg by mouth once daily.      magnesium oxide (MAG-OX) 400 mg (241.3 mg magnesium) tablet Take 1 tablet (400 mg total) by mouth daily as needed (cramping). 30 tablet 0    blood sugar diagnostic (TRUE METRIX GLUCOSE TEST STRIP) Strp Use 1x daily. Insurance preferred. 100 strip 3    blood  sugar diagnostic Strp To check BG 1 time daily, to use with insurance preferred meter 100 strip 3    cloNIDine (CATAPRES) 0.1 MG tablet Take 1 tablet (0.1 mg total) by mouth 3 (three) times daily as needed (PRN SBP > 165 mmHg). 90 tablet 6    cloNIDine 0.1 mg/24 hr td ptwk (CATAPRES) 0.1 mg/24 hr Place 1 patch onto the skin every 7 days. 4 patch 4    lancets Misc To check BG 1 times daily, to use with insurance preferred meter 100 each 3    nitroGLYCERIN (NITROSTAT) 0.4 MG SL tablet Place 1 tablet (0.4 mg total) under the tongue every 5 (five) minutes as needed for Chest pain. 25 tablet 0    [DISCONTINUED] aspirin (ECOTRIN) 81 MG EC tablet Take 1 tablet (81 mg total) by mouth once daily. 90 tablet 3    [DISCONTINUED] DULoxetine (CYMBALTA) 20 MG capsule TAKE ONE CAPSULE BY MOUTH ONCE DAILY 30 capsule 4    [DISCONTINUED] folic acid (FOLVITE) 1 MG tablet Take 1 mg by mouth once daily.      [DISCONTINUED] metFORMIN (GLUCOPHAGE-XR) 500 MG ER 24hr tablet Take 2 tablets (1,000 mg total) by mouth 2 (two) times daily with meals. 360 tablet 3    [DISCONTINUED] sodium bicarbonate 650 MG tablet Take 1 tablet (650 mg total) by mouth once daily. 30 tablet 11       ALLERGIES:  Review of patient's allergies indicates:   Allergen Reactions    Percocet [oxycodone-acetaminophen] Itching    Amiodarone analogues      Itching      Irbesartan Swelling    Januvia [sitagliptin]     Jardiance [empagliflozin]      Leg cramps    Lipitor [atorvastatin] Other (See Comments)     Severe leg pain    Linaclotide Other (See Comments) and Nausea And Vomiting     Does not remember    Lubiprostone Other (See Comments) and Palpitations     Does not remember         VITALS:  Temp:  [97.5 °F (36.4 °C)-98.2 °F (36.8 °C)] 97.5 °F (36.4 °C)  Pulse:  [] 105  Resp:  [17-27] 18  SpO2:  [89 %-99 %] 93 %  BP: (118-161)/() 118/84    I/O last 3 completed shifts:  In: 340 [P.O.:240; IV Piggyback:100]  Out: 1775 [Urine:1775]      PHYSICAL EXAM:  Physical  Exam  Vitals reviewed.   Constitutional:       General: She is not in acute distress.     Appearance: She is obese.   Eyes:      General: No scleral icterus.  Cardiovascular:      Rate and Rhythm: Normal rate and regular rhythm.      Pulses: Normal pulses.   Pulmonary:      Comments: Increased work of breathing with wheezing  Abdominal:      Palpations: Abdomen is soft.      Comments: Peritoneal dialysis catheter exits the abdominal wall in the left lower abdomen, there is mild amount of ascitic drainage on the existing bandage, there was no significant erythema, fluctuance or sign of infection, the fluid within the peritoneal dialysis tube appears clear. There was significant anasarca and body wall edema especially in the dependent portions of her abdominal wall pannus.   Musculoskeletal:         General: Swelling present.      Cervical back: Normal range of motion and neck supple. No rigidity.      Right lower leg: Edema present.      Left lower leg: Edema present.   Skin:     General: Skin is warm.      Findings: No bruising or erythema.   Neurological:      General: No focal deficit present.      Mental Status: She is alert.      Motor: No weakness.   Psychiatric:         Mood and Affect: Mood normal.         Behavior: Behavior normal.         Thought Content: Thought content normal.           LABS:  Lab Results   Component Value Date    WBC 10.71 06/20/2024    RBC 4.69 06/20/2024    HGB 11.7 (L) 06/20/2024    HCT 40.6 06/20/2024     06/20/2024     Lab Results   Component Value Date     (H) 06/20/2024     06/20/2024    K 4.6 06/20/2024     06/20/2024    CO2 27 06/20/2024     (H) 06/20/2024    CREATININE 4.3 (H) 06/20/2024    CALCIUM 9.6 06/20/2024     Lab Results   Component Value Date    ALT 15 06/20/2024    AST 10 06/20/2024    ALKPHOS 76 06/20/2024    BILITOT 0.7 06/20/2024     Lab Results   Component Value Date    MG 2.5 06/20/2024    PHOS 3.5 05/08/2024           ASSESSMENT  & PLAN:  73 y.o. female admitted for fluid overload current undergone diuresis, PD catheter in place   -continue diuresis per Nephrology recommendations in attempt to avoid hemodialysis   -peritoneal dialysis catheter in place and without obvious signs of infection, she was had ascitic drainage around the catheter since surgery if you weeks ago however there has no sign of peritonitis or infection, this has likely related to her fluid overload status and drainage around the tube, fortunately this has decreased recently, I suspect as her fluid status improves this will stop and seal, if for any reason diuresis it was not adequate and she was heading towards the need for traditional hemodialysis he was not unreasonable to attempt peritoneal dialysis catheter but would need to monitor for excessive drainage around the tubing  -okay for sterile occlusive bandage in place over the catheter site and for the patient to shower if desired

## 2024-06-20 NOTE — RESPIRATORY THERAPY
06/20/24 0806   Patient Assessment/Suction   Level of Consciousness (AVPU) alert   Respiratory Effort Unlabored   PRE-TX-O2   Device (Oxygen Therapy) nasal cannula   $ Is the patient on Low Flow Oxygen? Yes   Flow (L/min) (Oxygen Therapy) 2   SpO2 (!) 93 %   Pulse Oximetry Type Intermittent   $ Pulse Oximetry - Multiple Charge Pulse Oximetry - Multiple   Pulse 105   Resp 18

## 2024-06-20 NOTE — ASSESSMENT & PLAN NOTE
Likely multifactorial from CHF with worsening CKD  EF 40-45% with elevated BNP  Currently on Furosemide and metolazone with poor urine output,will likely need dialysis   Nephrology is following

## 2024-06-20 NOTE — PT/OT/SLP EVAL
Occupational Therapy   Evaluation    Name: Juhi Taylor  MRN: [id number]  Admitting Diagnosis: Acute hypoxic respiratory failure  Recent Surgery: * No surgery found *      Recommendations:     Discharge Recommendations: High Intensity Therapy  Discharge Equipment Recommendations:  to be determined by next level of care  Barriers to discharge:   medical status; poor activity tolerance     Assessment:     Juhi Taylor is a 73 y.o. female with a medical diagnosis of Acute hypoxic respiratory failure. Performance deficits affecting function: weakness, impaired endurance, impaired self care skills, impaired functional mobility, gait instability, decreased lower extremity function, decreased upper extremity function, decreased safety awareness, impaired cardiopulmonary response to activity.      Rehab Prognosis: Fair; patient would benefit from acute skilled OT services to address these deficits and reach maximum level of function.       Plan:     Patient to be seen 5 x/week to address the above listed problems via self-care/home management, therapeutic activities, therapeutic exercises  Plan of Care Expires: 07/20/24  Plan of Care Reviewed with: patient    Subjective     Chief Complaint: needing to go to the bathroom   Patient/Family Comments/goals: return home     Occupational Profile:  Living Environment: patient lives in single story home with daughter and grandson   Previous level of function: performed self care with use of AD; ambulated short distances   Equipment Used at Home: rollator, cane, straight  Assistance upon Discharge: family is available for assistance at discharge     Pain/Comfort:  Location 1: abdomen  Pain Addressed 1: Distraction, Reposition, Cessation of Activity  Pain Rating Post-Intervention 1: 0/10    Patients cultural, spiritual, Sabianism conflicts given the current situation:      Objective:     Communicated with: nurse prior to session.  Patient found HOB elevated with blood  pressure cuff, antony catheter, oxygen, peripheral IV, telemetry upon OT entry to room.    General Precautions: Standard, fall  Orthopedic Precautions: N/A  Braces: N/A  Respiratory Status: Nasal cannula, flow 2 L/min    Occupational Performance:    Bed Mobility:    Patient completed Rolling/Turning to Left with  moderate assistance  Patient completed Scooting/Bridging with minimum assistance  Patient completed Supine to Sit with moderate assistance    Functional Mobility/Transfers:  Patient completed Sit <> Stand Transfer with moderate assistance  with  hand-held assist   Patient completed Bedside Commode transfer with Stand Pivot technique with moderate assistance with  hand-held assist    Activities of Daily Living:  Grooming: minimum assistance for washing hands in seated position   Lower Body Dressing: maximal assistance for donning socks   Toileting: moderate assistance for clothing management and hygiene  Patient required an extensive amount of time to complete transfers; requiring frequent rest breaks stating she was out of breath and could not stop coughing     Cognitive/Visual Perceptual:  Cognitive/Psychosocial Skills:     -       Oriented to: Person, Place, and Time   -       Follows Commands/attention:Follows multistep  commands  -       Communication: clear/fluent  -       Memory: No Deficits noted  -       Safety awareness/insight to disability: intact   -       Mood/Affect/Coping skills/emotional control: Appropriate to situation    Physical Exam:  Upper Extremity Range of Motion:     -       Right Upper Extremity: WFL  -       Left Upper Extremity: WFL  Upper Extremity Strength:    -       Right Upper Extremity: WFL  -       Left Upper Extremity: WFL   Strength:    -       Right Upper Extremity: WFL  -       Left Upper Extremity: WFL  Fine Motor Coordination:    -       Intact    AMPAC 6 Click ADL:  AMPAC Total Score: 16    Treatment & Education:  Patient was educated on role of OT/POC,  importance of getting out of bed during hospitalization, equipment needs, discharge planning and reviewed use of call bell for assistance.    Patient left HOB elevated with all lines intact and call button in reach    GOALS:   Multidisciplinary Problems       Occupational Therapy Goals          Problem: Occupational Therapy    Goal Priority Disciplines Outcome Interventions   Occupational Therapy Goal     OT, PT/OT     Description: Goals to be met by: 7/20/2024     Patient will increase functional independence with ADLs by performing:    UE Dressing with Supervision.  LE Dressing with Supervision.  Grooming while standing with Supervision.  Toileting from toilet with Supervision for hygiene and clothing management.                          History:     Past Medical History:   Diagnosis Date    Anticoagulant long-term use     Arthritis     Breast cancer 2014    invasive lobular carcinoma    Cancer of kidney 11/2020    RIGHT KIDNEY CANCER    CHF (congestive heart failure)     Coronary artery disease dx 2005    Depression     Diabetes mellitus     Diastolic heart failure secondary to hypertension     Gout     Hyperlipemia     Hypertension     Hypertrophy of nasal turbinates     Kidney mass 2020    Right    Levoscoliosis     Lung nodule     left    Multiple thyroid nodules     NICOLE (obstructive sleep apnea)     uses C-PAP    Pulmonary hypertension          Past Surgical History:   Procedure Laterality Date    AORTOGRAPHY N/A 12/04/2020    Procedure: Aortogram;  Surgeon: Paul Pedersen MD;  Location: [facility];  Service: Cardiology;  Laterality: N/A;    BREAST SURGERY      CARDIAC CATHETERIZATION  12/2020    CHOLECYSTECTOMY      COLONOSCOPY      multi -last 2014     CORONARY ARTERY BYPASS GRAFT      ESOPHAGOGASTRODUODENOSCOPY      2012     HAND SURGERY Right     INSERTION, CATHETER, DIALYSIS, PERITONEAL N/A 6/4/2024    Procedure: IN Insertion Catheter, Dialysis, Peritoneal - laparoscopic;  Surgeon: Rodriguez Catalan  "MD Cesar;  Location: [facility] OR;  Service: General;  Laterality: N/A;    LAPAROSCOPIC ROBOT-ASSISTED SURGICAL REMOVAL OF KIDNEY USING DA CHELLE XI Right 03/10/2022    Procedure: XI ROBOTIC NEPHRECTOMY- radical;  Surgeon: Rolando Ramirez MD;  Location: [facility] OR;  Service: Urology;  Laterality: Right;    MASTECTOMY W/ SENTINEL NODE BIOPSY Bilateral 01/21/2015    bilateral "dog ears"    NASAL SINUS SURGERY  2015    Dr Bryant FESS/cauterization turbinate     PARTIAL HYSTERECTOMY  1989    PERCUTANEOUS TRANSLUMINAL BALLOON ANGIOPLASTY OF CORONARY ARTERY  12/04/2020    Procedure: Angioplasty-coronary;  Surgeon: Paul Pedersen MD;  Location: [facility] CATH;  Service: Cardiology;;    RENAL BIOPSY Right     9/20/2021 EJ    TUBAL LIGATION      ULTRASOUND GUIDANCE  12/04/2020    Procedure: Ultrasound Guidance;  Surgeon: Paul Pedersen MD;  Location: [facility] CATH;  Service: Cardiology;;       Time Tracking:     OT Date of Treatment: 06/20/24  OT Start Time: 1300  OT Stop Time: 1323  OT Total Time (min): 23 min    Billable Minutes:Evaluation 10  Self Care/Home Management 13    6/20/2024  "

## 2024-06-20 NOTE — ASSESSMENT & PLAN NOTE
Patient had PD cath placed 2 weeks ago however due to insurance situation never received dialysis  See under respiratory failure

## 2024-06-20 NOTE — NURSING
Nurses Note -- 4 Eyes      6/19/2024   8:11 PM      Skin assessed during: Admit      [x] No Altered Skin Integrity Present    []Prevention Measures Documented      [] Yes- Altered Skin Integrity Present or Discovered   [] LDA Added if Not in Epic (Describe Wound)   [] New Altered Skin Integrity was Present on Admit and Documented in LDA   [] Wound Image Taken    Wound Care Consulted? No    Attending Nurse:  Gladis Strickland RN/Staff Member:   [id number]

## 2024-06-20 NOTE — PLAN OF CARE
Problem: Adult Inpatient Plan of Care  Goal: Plan of Care Review  Outcome: Progressing     Problem: Bariatric Environmental Safety  Goal: Safety Maintained with Care  Outcome: Progressing     Problem: Fall Injury Risk  Goal: Absence of Fall and Fall-Related Injury  Outcome: Progressing

## 2024-06-20 NOTE — ASSESSMENT & PLAN NOTE
EF 40-45% with elevated BNP  Currently on Furosemide and metolazone with poor urine output,will likely need dialysis   Nephrology is following

## 2024-06-20 NOTE — SUBJECTIVE & OBJECTIVE
Interval History: Patient said she feels tired today.    Review of Systems   All other systems reviewed and are negative.    Objective:     Vital Signs (Most Recent):  Temp: 98 °F (36.7 °C) (06/20/24 1540)  Pulse: 86 (06/20/24 1540)  Resp: 18 (06/20/24 0806)  BP: (!) 134/94 (06/20/24 1615)  SpO2: 97 % (06/20/24 1540) Vital Signs (24h Range):  Temp:  [97.2 °F (36.2 °C)-98 °F (36.7 °C)] 98 °F (36.7 °C)  Pulse:  [] 86  Resp:  [17-24] 18  SpO2:  [93 %-99 %] 97 %  BP: (105-148)/() 134/94     Weight: (!) 147.3 kg (324 lb 11.8 oz)  Body mass index is 50.86 kg/m².    Intake/Output Summary (Last 24 hours) at 6/20/2024 1655  Last data filed at 6/20/2024 1211  Gross per 24 hour   Intake 400 ml   Output 750 ml   Net -350 ml         Physical Exam  Constitutional:       Appearance: She is obese. She is ill-appearing.   Cardiovascular:      Rate and Rhythm: Normal rate.   Pulmonary:      Effort: Pulmonary effort is normal.      Breath sounds: Rales present.   Musculoskeletal:         General: Swelling present.   Neurological:      Mental Status: She is oriented to person, place, and time.             Significant Labs: All pertinent labs within the past 24 hours have been reviewed.    Significant Imaging: I have reviewed all pertinent imaging results/findings within the past 24 hours.

## 2024-06-20 NOTE — PT/OT/SLP PROGRESS
Physical Therapy      Patient Name:  Juhi Taylor   MRN:  [id number]    Patient not seen today secondary to Patient refused PT x 2 attempts due to fatigue, despite encouragement. Will follow-up 6/21/24.

## 2024-06-20 NOTE — PROGRESS NOTES
[facility]  Progress Note    Patient Name: Juhi Taylor  MRN: [id number]  Patient Class: IP- Inpatient   Admission Date: 6/18/2024  Length of Stay: 1 days  Attending Physician: Vani Kign DO  Primary Care Provider: Tony Sadler MD        Subjective:     Principal Problem:Acute hypoxic respiratory failure        HPI:  73 year old pt getting admitted with fluid overload in need of dialysis  Pt had PD catheter insertion 2 weeks ago  She was supposed to start on PD for ESRD but got delayed due to insurance issues  She has oliguria/increased weight gain in past 2 weeks  Also SOB on exertion and cough  Symptoms got worse and she came to ER and got admitted  Pt was started on lasix gtt iv         Overview/Hospital Course:  Patient is a 73-year-old female who was admitted for acute hypoxic respiratory failure likely multifactorial from CKD and CHF .  Patient had PD cath placed 2 weeks ago however due to insurance situation never received dialysis. EF 40 -45% with elevated BNP. Patient was placed on Furosemide and metolazone with poor urine output eventually leading to dialysis.  Nephrology is following. Discontinued antibiotics since infection was ruled out.     No new subjective & objective note has been filed under this hospital service since the last note was generated.      Assessment/Plan:      * Acute hypoxic respiratory failure  Likely multifactorial from CHF with worsening CKD  EF 40-45% with elevated BNP  Currently on Furosemide and metolazone with poor urine output,will likely need dialysis   Nephrology is following      Chronic kidney disease (CKD), stage V  Patient had PD cath placed 2 weeks ago however due to insurance situation never received dialysis  See under respiratory failure       CHF (congestive heart failure)  EF 40-45% with elevated BNP  Currently on Furosemide and metolazone with poor urine output,will likely need dialysis   Nephrology is  "following       Renal cell carcinoma of right kidney  Aware       Paroxysmal atrial fibrillation  Chronic stable issue  Continue carvedilol, Holding home apixaban for now      Coronary artery disease  Stable  Continue with medical management    Essential hypertension  Chronic, controlled. Latest blood pressure and vitals reviewed-     Temp:  [98.6 °F (37 °C)]   Pulse:  [104-106]   Resp:  [20-22]   BP: (138-162)/()   SpO2:  [89 %-97 %] .   Home meds for hypertension were reviewed and noted below.   Hypertension Medications               carvediloL (COREG) 25 MG tablet Take 1 tablet (25 mg total) by mouth 2 (two) times daily.    cloNIDine (CATAPRES) 0.1 MG tablet Take 1 tablet (0.1 mg total) by mouth 3 (three) times daily as needed (PRN SBP > 165 mmHg).    cloNIDine 0.1 mg/24 hr td ptwk (CATAPRES) 0.1 mg/24 hr Place 1 patch onto the skin every 7 days.    furosemide (LASIX) 40 MG tablet Take 40 mg by mouth once daily.    amLODIPine (NORVASC) 10 MG tablet Take 10 mg by mouth once daily.    nitroGLYCERIN (NITROSTAT) 0.4 MG SL tablet Place 1 tablet (0.4 mg total) under the tongue every 5 (five) minutes as needed for Chest pain.            While in the hospital, will manage blood pressure as follows; Continue home antihypertensive regimen    Will utilize p.r.n. blood pressure medication only if patient's blood pressure greater than 140/90 and she develops symptoms such as worsening chest pain or shortness of breath.    Type 2 diabetes mellitus with hyperglycemia  Patient's FSGs are controlled on current medication regimen.  Last A1c reviewed-   Lab Results   Component Value Date    HGBA1C 5.4 01/22/2024     Most recent fingerstick glucose reviewed- No results for input(s): "POCTGLUCOSE" in the last 24 hours.  Current correctional scale  Medium  Maintain anti-hyperglycemic dose as follows-   Antihyperglycemics (From admission, onward)      Start     Stop Route Frequency Ordered    06/18/24 6546  insulin aspart U-100 " pen 0-10 Units         -- SubQ Before meals & nightly PRN 06/18/24 1519          Hold Oral hypoglycemics while patient is in the hospital.    Morbid obesity  Body mass index is 45.73 kg/m². Morbid obesity complicates all aspects of disease management from diagnostic modalities to treatment.       VTE Risk Mitigation (From admission, onward)           Ordered     enoxaparin injection 135 mg  Every 24 hours         06/18/24 1832     IP VTE HIGH RISK PATIENT  Once         06/18/24 1519     Place sequential compression device  Until discontinued         06/18/24 1519                    Discharge Planning   HARINI: 6/22/2024     Code Status: Full Code   Is the patient medically ready for discharge?:     Reason for patient still in hospital (select all that apply): Patient trending condition  Discharge Plan A: Home with family                  Vani King DO  Department of Hospital Medicine   Formerly [facility]

## 2024-06-20 NOTE — PROGRESS NOTES
Nephrology Consult Note        Patient Name: Juhi Taylor  MRN: [id number]    Patient Class: IP- Inpatient   Admission Date: 6/18/2024  Length of Stay: 1 days  Date of Service: 6/20/2024    Attending Physician: Vani King DO  Primary Care Provider: Tony Sadler MD    Reason for Consult: josue    SUBJECTIVE:     HPI: 73F fallowed with Dr. Martinez from nephrology for advanced CKD stage 4-5 getting admitted with fluid overload. Pt had PD catheter insertion 2 weeks ago in preparation for initiation of dialysis, however had post-operative issues with increased leakage. Then her PD in initiation and training was delayed due to insurance issues. She has oliguria/increased weight gain in past 2 weeks, SOB on exertion and cough. Symptoms got worse and she came to ER and got admitted.     I saw her in the ER and recommend trial of lasix gtt, antony. Will consult Dr. Catalan to evaluate patient and make recommendation for catheter care - we probably will not be able to use it for now for dialysis.    6/19 VSS. Only 175cc UO documented, but may be inaccurately charted - pt boarding in ER. Will increase lasix gtt to 10mg/hr and ensure at least q shift UO documentation.  6/20 VSS. BP low, stop Clonidine patch. Increase lasix gtt to 15mg/hr, add metolazone. Stop Fluconazole in 1-2 days after stopping IV abx for suspected PNA. Appreciate Surgery input - will d/w patient initiation of HD tomorrow if unable to produce much UO and symptomatic improvement...    ASSESSMENT/PLAN:     CKD stage 5  Volume overload  CHF exacerbation  Uremia  PD cathether in place  No NSAIDs, ACEI/ARB, IV contrast or other nephrotoxins.  Keep MAP > 60, SBP > 100.  Dose meds for GFR < 30 ml/min.  Renal diet - low K, low phos.  Lasix gtt, antony, if not working consider hemodialysis or PD.    BP low, stop Clonidine patch. Increase lasix gtt to 15mg/hr, add metolazone. Stop Fluconazole in 1-2 days after stopping IV abx for suspected  "PNA. Appreciate Surgery input - will d/w patient initiation of HD tomorrow if unable to produce much UO and symptomatic improvement...    Anemia of CKD  Hgb and HCT are acceptable. Monitor for now.  Will provide PONCE and/or IV iron as needed to keep Hgb 8-10 range.    MBD / Secondary HPT  Ca, Phos, PTH and vitamin D levels are acceptable.   Phos binders, vitamin D and analogues, calcimimetics will be given as needed.    HTN  Tolerate asymptomatic HTN up to -160.  Continue home meds - stop Clonidine patch, keep PRN + Coreg.  Low sodium diet as tolerated.    Thank you for allowing us to participate in the care of your patient!   We will follow the patient and provide recommendations as needed.         Laboratory:  Recent Labs   Lab 06/18/24  1241 06/19/24  0443 06/20/24  0402    141 141   K 4.3 4.6 4.6    106 105   CO2 26 27 27   BUN 93* 88* 103*   CREATININE 4.1* 4.2* 4.3*   * 163* 158*       Recent Labs   Lab 06/18/24  1241 06/19/24  0443 06/20/24  0402   CALCIUM 9.6 9.6 9.6   ALBUMIN  --  3.4* 3.2*   MG  --  2.5 2.5       Recent Labs   Lab 10/10/23  1113 01/22/24  1133 05/08/24  1139   PTH, Intact 583.1 H 506.7 H 291.7 H       No results for input(s): "POCTGLUCOSE" in the last 168 hours.    Recent Labs   Lab 07/05/23  1533 01/22/24  1133 06/19/24  0443   Hemoglobin A1C 5.6 5.4 6.8 H       Recent Labs   Lab 06/18/24  1239 06/19/24  0443 06/20/24  0402   WBC 10.03 9.74 10.71   HGB 12.5 12.3 11.7*   HCT 42.7 42.1 40.6    228 216   MCV 86 88 87   MCHC 29.3* 29.2* 28.8*   MONO 8.4  0.8 8.8  0.9 7.4  0.8   EOSINOPHIL 1.6 1.0 0.2       Recent Labs   Lab 06/19/24  0443 06/20/24  0402   BILITOT 0.8 0.7   PROT 6.1 5.9*   ALBUMIN 3.4* 3.2*   ALKPHOS 84 76   ALT 16 15   AST 14 10       Recent Labs   Lab 03/17/22  2240 04/07/22  1536 04/17/23  1448 07/14/23  2026 01/22/24  1129 03/14/24  2254 05/09/24  1029   Color, UA Colorless A   < > Colorless A  --   --  Yellow Yellow   Appearance, UA " Clear   < > Clear  --   --  Clear Clear   pH, UA 5.0   < > 6.0  --   --  6.0 6.0   Specific Leona, UA 1.010   < > 1.010  --   --  1.010 1.015   Protein, UA Negative   < > 1+ A  --   --  1+ A 1+ A   Glucose, UA Negative   < > Negative  --   --  Negative Negative   Ketones, UA Negative   < > Negative  --   --  Negative Negative   Urobilinogen, UA Negative  --   --   --   --  Negative  --    Bilirubin (UA) Negative   < > Negative  --   --  Negative Negative   Occult Blood UA Negative   < > Negative  --   --  TRACE Negative   Nitrite, UA Negative   < > Negative  --   --  Negative Negative   RBC, UA  --    < > 3   < > 1 3 1   WBC, UA  --    < > 3   < > 1 5 1   Bacteria  --    < > None  --  Occasional None Rare   Hyaline Casts, UA  --    < > 0  --   --  0 0    < > = values in this interval not displayed.             Microbiology Results (last 7 days)       ** No results found for the last 168 hours. **            Review of patient's allergies indicates:   Allergen Reactions    Percocet [oxycodone-acetaminophen] Itching    Amiodarone analogues      Itching      Irbesartan Swelling    Januvia [sitagliptin]     Jardiance [empagliflozin]      Leg cramps    Lipitor [atorvastatin] Other (See Comments)     Severe leg pain    Linaclotide Other (See Comments) and Nausea And Vomiting     Does not remember    Lubiprostone Other (See Comments) and Palpitations     Does not remember       Outpatient meds:  No current facility-administered medications on file prior to encounter.     Current Outpatient Medications on File Prior to Encounter   Medication Sig Dispense Refill    allopurinoL (ZYLOPRIM) 300 MG tablet Take 1 tablet (300 mg total) by mouth once daily. 90 tablet 3    amLODIPine (NORVASC) 10 MG tablet Take 10 mg by mouth once daily.      apixaban (ELIQUIS) 5 mg Tab Take 1 tablet (5 mg total) by mouth 2 (two) times daily. 180 tablet 2    calcitRIOL (ROCALTROL) 0.5 MCG Cap Take 0.5 mcg by mouth once daily.      carvediloL  (COREG) 25 MG tablet Take 1 tablet (25 mg total) by mouth 2 (two) times daily. 180 tablet 2    cyanocobalamin (VITAMIN B-12) 100 MCG tablet Take 100 mcg by mouth once daily.      evolocumab (REPATHA SURECLICK) 140 mg/mL PnIj Inject 1 mL (140 mg total) into the skin every 14 (fourteen) days. 2 mL 11    furosemide (LASIX) 40 MG tablet Take 40 mg by mouth once daily.      loratadine (CLARITIN) 10 mg tablet Take 10 mg by mouth once daily.      magnesium oxide (MAG-OX) 400 mg (241.3 mg magnesium) tablet Take 1 tablet (400 mg total) by mouth daily as needed (cramping). 30 tablet 0    blood sugar diagnostic (TRUE METRIX GLUCOSE TEST STRIP) Strp Use 1x daily. Insurance preferred. 100 strip 3    blood sugar diagnostic Strp To check BG 1 time daily, to use with insurance preferred meter 100 strip 3    cloNIDine (CATAPRES) 0.1 MG tablet Take 1 tablet (0.1 mg total) by mouth 3 (three) times daily as needed (PRN SBP > 165 mmHg). 90 tablet 6    cloNIDine 0.1 mg/24 hr td ptwk (CATAPRES) 0.1 mg/24 hr Place 1 patch onto the skin every 7 days. 4 patch 4    lancets Misc To check BG 1 times daily, to use with insurance preferred meter 100 each 3    nitroGLYCERIN (NITROSTAT) 0.4 MG SL tablet Place 1 tablet (0.4 mg total) under the tongue every 5 (five) minutes as needed for Chest pain. 25 tablet 0    [DISCONTINUED] aspirin (ECOTRIN) 81 MG EC tablet Take 1 tablet (81 mg total) by mouth once daily. 90 tablet 3    [DISCONTINUED] DULoxetine (CYMBALTA) 20 MG capsule TAKE ONE CAPSULE BY MOUTH ONCE DAILY 30 capsule 4    [DISCONTINUED] folic acid (FOLVITE) 1 MG tablet Take 1 mg by mouth once daily.      [DISCONTINUED] metFORMIN (GLUCOPHAGE-XR) 500 MG ER 24hr tablet Take 2 tablets (1,000 mg total) by mouth 2 (two) times daily with meals. 360 tablet 3    [DISCONTINUED] sodium bicarbonate 650 MG tablet Take 1 tablet (650 mg total) by mouth once daily. 30 tablet 11       Scheduled meds:   carvediloL  25 mg Oral BID    enoxparin  1 mg/kg  Subcutaneous Q24H (treatment, non-standard time)    famotidine  20 mg Oral Daily    fluconazole  100 mg Oral Daily    metOLazone  5 mg Oral Daily    mupirocin   Nasal BID       Infusions:   furosemide (LASIX) 2 mg/mL continuous infusion (non-titrating)  15 mg/hr Intravenous Continuous 5 mL/hr at 06/20/24 0645 10 mg/hr at 06/20/24 0645       PRN meds:    Current Facility-Administered Medications:     acetaminophen, 650 mg, Oral, Q8H PRN    aluminum-magnesium hydroxide-simethicone, 30 mL, Oral, QID PRN    dextrose 50%, 12.5 g, Intravenous, PRN    dextrose 50%, 25 g, Intravenous, PRN    glucagon (human recombinant), 1 mg, Intramuscular, PRN    glucose, 16 g, Oral, PRN    glucose, 24 g, Oral, PRN    hydrALAZINE, 10 mg, Intravenous, Q6H PRN    HYDROcodone-acetaminophen, 1 tablet, Oral, Q6H PRN    insulin aspart U-100, 0-10 Units, Subcutaneous, QID (AC + HS) PRN    melatonin, 6 mg, Oral, Nightly PRN    ondansetron, 4 mg, Intravenous, Q6H PRN    Past Medical History:   Diagnosis Date    Anticoagulant long-term use     Arthritis     Breast cancer 2014    invasive lobular carcinoma    Cancer of kidney 11/2020    RIGHT KIDNEY CANCER    CHF (congestive heart failure)     Coronary artery disease dx 2005    Depression     Diabetes mellitus     Diastolic heart failure secondary to hypertension     Gout     Hyperlipemia     Hypertension     Hypertrophy of nasal turbinates     Kidney mass 2020    Right    Levoscoliosis     Lung nodule     left    Multiple thyroid nodules     NICOLE (obstructive sleep apnea)     uses C-PAP    Pulmonary hypertension      Past Surgical History:   Procedure Laterality Date    AORTOGRAPHY N/A 12/04/2020    Procedure: Aortogram;  Surgeon: Paul Pedersen MD;  Location: [facility];  Service: Cardiology;  Laterality: N/A;    BREAST SURGERY      CARDIAC CATHETERIZATION  12/2020    CHOLECYSTECTOMY      COLONOSCOPY      multi -last 2014     CORONARY ARTERY BYPASS GRAFT      ESOPHAGOGASTRODUODENOSCOPY      2012      "HAND SURGERY Right     INSERTION, CATHETER, DIALYSIS, PERITONEAL N/A 2024    Procedure: IN Insertion Catheter, Dialysis, Peritoneal - laparoscopic;  Surgeon: Rodriguez Catalan Jr., MD;  Location: [facility] OR;  Service: General;  Laterality: N/A;    LAPAROSCOPIC ROBOT-ASSISTED SURGICAL REMOVAL OF KIDNEY USING DA CHELLE XI Right 03/10/2022    Procedure: XI ROBOTIC NEPHRECTOMY- radical;  Surgeon: Rolando Ramirez MD;  Location: [facility] OR;  Service: Urology;  Laterality: Right;    MASTECTOMY W/ SENTINEL NODE BIOPSY Bilateral 2015    bilateral "dog ears"    NASAL SINUS SURGERY      Dr Bryant FESS/cauterization turbinate     PARTIAL HYSTERECTOMY      PERCUTANEOUS TRANSLUMINAL BALLOON ANGIOPLASTY OF CORONARY ARTERY  2020    Procedure: Angioplasty-coronary;  Surgeon: Paul Pedersen MD;  Location: [facility] CATH;  Service: Cardiology;;    RENAL BIOPSY Right     2021 EJ    TUBAL LIGATION      ULTRASOUND GUIDANCE  2020    Procedure: Ultrasound Guidance;  Surgeon: Paul Pedersen MD;  Location: [facility] CATH;  Service: Cardiology;;     Family History   Problem Relation Name Age of Onset    Breast cancer Mother      Stroke Father Waqar Sr.     Hypertension Father Waqar Sr.     Hepatitis Brother      Asthma Daughter Nelliris Taylor     Birth defects Daughter Nell Camejo's two children has cleft lips    Depression Daughter Nell Taylor     Drug abuse Daughter Nell Taylor     Learning disabilities Daughter Nelliris Taylor     Mental illness Daughter Nell Taylor     Breast cancer Maternal Aunt      Glaucoma Sister      Drug abuse Daughter Nell     Macular degeneration Neg Hx      Retinal detachment Neg Hx       Social History     Tobacco Use    Smoking status: Former     Current packs/day: 0.00     Types: Cigarettes     Start date: 2016     Quit date: 2016     Years since quittin.4    Smokeless tobacco: Never    Tobacco comments:     quit    Substance " Use Topics    Alcohol use: No    Drug use: No       OBJECTIVE:     Vital Signs and IO:  Temp:  [97.2 °F (36.2 °C)-98 °F (36.7 °C)]   Pulse:  []   Resp:  [17-24]   BP: (105-148)/()   SpO2:  [93 %-99 %]   I/O last 3 completed shifts:  In: 340 [P.O.:240; IV Piggyback:100]  Out: 1775 [Urine:1775]  Wt Readings from Last 5 Encounters:   06/19/24 (!) 147.3 kg (324 lb 11.8 oz)   06/19/24 132.5 kg (292 lb 1.8 oz)   06/04/24 134.3 kg (296 lb)   05/27/24 134.3 kg (296 lb)   04/01/24 131.7 kg (290 lb 4.8 oz)     Body mass index is 50.86 kg/m².    Physical Exam  Constitutional:       General: Patient is not in acute distress.     Appearance: Patient is well-developed. She is not diaphoretic.   HENT:      Head: Normocephalic and atraumatic.      Mouth/Throat: Mucous membranes are moist.   Eyes:      General: No scleral icterus.     Pupils: Pupils are equal, round, and reactive to light.   Cardiovascular:      Rate and Rhythm: Normal rate and regular rhythm.   Pulmonary:      Effort: Pulmonary effort is normal. No respiratory distress.      Breath sounds: No stridor.   Abdominal:      General: There is no distension.      Palpations: Abdomen is soft.   Musculoskeletal:         General: No deformity. Normal range of motion.      Cervical back: Neck supple.   Skin:     General: Skin is warm and dry.      Findings: No rash present. No erythema.   Neurological:      Mental Status: Patient is alert and oriented to person, place, and time.      Cranial Nerves: No cranial nerve deficit.   Psychiatric:         Behavior: Behavior normal.          Patient care time was spent personally by me on the following activities:     Obtaining a history.  Examination of patient.  Providing medical care at the patients bedside.  Developing a treatment plan with patient or surrogate and bedside caregivers.  Ordering and reviewing laboratory studies, radiographic studies, pulse oximetry.  Ordering and performing treatments and  interventions.  Evaluation of patient's response to treatment.  Discussions with consultants while on the unit and immediately available to the patient.  Re-evaluation of the patient's condition.  Documentation in the medical record.     Paolo Martinez MD    [facility]  [address] [place] [id number]    [phone] - tel  [phone] - fax    6/20/2024

## 2024-06-21 DIAGNOSIS — I34.0 NON-RHEUMATIC MITRAL REGURGITATION: ICD-10-CM

## 2024-06-21 DIAGNOSIS — Z95.5 S/P CORONARY ARTERY STENT PLACEMENT: Chronic | ICD-10-CM

## 2024-06-21 DIAGNOSIS — G47.33 OSA (OBSTRUCTIVE SLEEP APNEA): Chronic | ICD-10-CM

## 2024-06-21 DIAGNOSIS — E78.00 PURE HYPERCHOLESTEROLEMIA: ICD-10-CM

## 2024-06-21 DIAGNOSIS — I10 ESSENTIAL HYPERTENSION: ICD-10-CM

## 2024-06-21 DIAGNOSIS — N18.4 CKD STAGE 4 SECONDARY TO HYPERTENSION: ICD-10-CM

## 2024-06-21 DIAGNOSIS — I73.9 PERIPHERAL VASCULAR DISEASE, UNSPECIFIED: ICD-10-CM

## 2024-06-21 DIAGNOSIS — N18.4 CKD (CHRONIC KIDNEY DISEASE), STAGE IV: ICD-10-CM

## 2024-06-21 DIAGNOSIS — Z78.9 STATIN INTOLERANCE: ICD-10-CM

## 2024-06-21 DIAGNOSIS — I48.0 PAROXYSMAL ATRIAL FIBRILLATION: Chronic | ICD-10-CM

## 2024-06-21 DIAGNOSIS — N18.5 CHRONIC KIDNEY DISEASE (CKD), STAGE V: ICD-10-CM

## 2024-06-21 DIAGNOSIS — I50.23 ACUTE ON CHRONIC SYSTOLIC CHF (CONGESTIVE HEART FAILURE): ICD-10-CM

## 2024-06-21 DIAGNOSIS — I11.0 DIASTOLIC HEART FAILURE SECONDARY TO HYPERTENSION: ICD-10-CM

## 2024-06-21 DIAGNOSIS — I12.9 CKD STAGE 4 SECONDARY TO HYPERTENSION: ICD-10-CM

## 2024-06-21 DIAGNOSIS — I50.42 CHRONIC COMBINED SYSTOLIC AND DIASTOLIC HEART FAILURE: ICD-10-CM

## 2024-06-21 DIAGNOSIS — I25.10 CORONARY ARTERY DISEASE INVOLVING NATIVE CORONARY ARTERY OF NATIVE HEART WITHOUT ANGINA PECTORIS: Chronic | ICD-10-CM

## 2024-06-21 DIAGNOSIS — I51.89 DIASTOLIC DYSFUNCTION: ICD-10-CM

## 2024-06-21 DIAGNOSIS — I50.30 DIASTOLIC HEART FAILURE SECONDARY TO HYPERTENSION: ICD-10-CM

## 2024-06-21 LAB
ALBUMIN SERPL BCP-MCNC: 3.2 G/DL (ref 3.5–5.2)
ALP SERPL-CCNC: 84 U/L (ref 55–135)
ALT SERPL W/O P-5'-P-CCNC: 10 U/L (ref 10–44)
ANION GAP SERPL CALC-SCNC: 9 MMOL/L (ref 8–16)
AST SERPL-CCNC: 15 U/L (ref 10–40)
BASOPHILS # BLD AUTO: 0.01 K/UL (ref 0–0.2)
BASOPHILS NFR BLD: 0.1 % (ref 0–1.9)
BILIRUB SERPL-MCNC: 0.5 MG/DL (ref 0.1–1)
BUN SERPL-MCNC: 112 MG/DL (ref 8–23)
CALCIUM SERPL-MCNC: 9.1 MG/DL (ref 8.7–10.5)
CHLORIDE SERPL-SCNC: 105 MMOL/L (ref 95–110)
CO2 SERPL-SCNC: 22 MMOL/L (ref 23–29)
CREAT SERPL-MCNC: 4.8 MG/DL (ref 0.5–1.4)
DIFFERENTIAL METHOD BLD: ABNORMAL
EOSINOPHIL # BLD AUTO: 0.1 K/UL (ref 0–0.5)
EOSINOPHIL NFR BLD: 1.2 % (ref 0–8)
ERYTHROCYTE [DISTWIDTH] IN BLOOD BY AUTOMATED COUNT: 18.5 % (ref 11.5–14.5)
EST. GFR  (NO RACE VARIABLE): 9.2 ML/MIN/1.73 M^2
GLUCOSE SERPL-MCNC: 137 MG/DL (ref 70–110)
GLUCOSE SERPL-MCNC: 151 MG/DL (ref 70–110)
GLUCOSE SERPL-MCNC: 185 MG/DL (ref 70–110)
GLUCOSE SERPL-MCNC: 190 MG/DL (ref 70–110)
GLUCOSE SERPL-MCNC: 204 MG/DL (ref 70–110)
HCT VFR BLD AUTO: 41.3 % (ref 37–48.5)
HGB BLD-MCNC: 11.9 G/DL (ref 12–16)
IMM GRANULOCYTES # BLD AUTO: 0.05 K/UL (ref 0–0.04)
IMM GRANULOCYTES NFR BLD AUTO: 0.5 % (ref 0–0.5)
LYMPHOCYTES # BLD AUTO: 1.2 K/UL (ref 1–4.8)
LYMPHOCYTES NFR BLD: 12.4 % (ref 18–48)
MCH RBC QN AUTO: 25.2 PG (ref 27–31)
MCHC RBC AUTO-ENTMCNC: 28.8 G/DL (ref 32–36)
MCV RBC AUTO: 87 FL (ref 82–98)
MONOCYTES # BLD AUTO: 0.6 K/UL (ref 0.3–1)
MONOCYTES NFR BLD: 6.5 % (ref 4–15)
NEUTROPHILS # BLD AUTO: 7.5 K/UL (ref 1.8–7.7)
NEUTROPHILS NFR BLD: 79.3 % (ref 38–73)
NRBC BLD-RTO: 0 /100 WBC
PLATELET # BLD AUTO: 212 K/UL (ref 150–450)
PMV BLD AUTO: 10.2 FL (ref 9.2–12.9)
POTASSIUM SERPL-SCNC: 5 MMOL/L (ref 3.5–5.1)
PROT SERPL-MCNC: 6.1 G/DL (ref 6–8.4)
RBC # BLD AUTO: 4.73 M/UL (ref 4–5.4)
SODIUM SERPL-SCNC: 136 MMOL/L (ref 136–145)
WBC # BLD AUTO: 9.41 K/UL (ref 3.9–12.7)

## 2024-06-21 PROCEDURE — 99900031 HC PATIENT EDUCATION (STAT)

## 2024-06-21 PROCEDURE — 63600175 PHARM REV CODE 636 W HCPCS: Performed by: INTERNAL MEDICINE

## 2024-06-21 PROCEDURE — 97530 THERAPEUTIC ACTIVITIES: CPT | Mod: CQ

## 2024-06-21 PROCEDURE — 85025 COMPLETE CBC W/AUTO DIFF WBC: CPT | Performed by: INTERNAL MEDICINE

## 2024-06-21 PROCEDURE — 25000003 PHARM REV CODE 250: Performed by: INTERNAL MEDICINE

## 2024-06-21 PROCEDURE — 27000221 HC OXYGEN, UP TO 24 HOURS

## 2024-06-21 PROCEDURE — 80053 COMPREHEN METABOLIC PANEL: CPT | Performed by: INTERNAL MEDICINE

## 2024-06-21 PROCEDURE — 63700000 PHARM REV CODE 250 ALT 637 W/O HCPCS: Performed by: INTERNAL MEDICINE

## 2024-06-21 PROCEDURE — 25000003 PHARM REV CODE 250: Performed by: FAMILY MEDICINE

## 2024-06-21 PROCEDURE — 21400001 HC TELEMETRY ROOM

## 2024-06-21 PROCEDURE — 94761 N-INVAS EAR/PLS OXIMETRY MLT: CPT

## 2024-06-21 PROCEDURE — 36415 COLL VENOUS BLD VENIPUNCTURE: CPT | Performed by: INTERNAL MEDICINE

## 2024-06-21 PROCEDURE — 94799 UNLISTED PULMONARY SVC/PX: CPT

## 2024-06-21 RX ORDER — GUAIFENESIN 100 MG/5ML
200 SOLUTION ORAL EVERY 12 HOURS
Status: DISCONTINUED | OUTPATIENT
Start: 2024-06-21 | End: 2024-06-23

## 2024-06-21 RX ORDER — APIXABAN 5 MG/1
5 TABLET, FILM COATED ORAL 2 TIMES DAILY
Qty: 180 TABLET | Refills: 0 | Status: SHIPPED | OUTPATIENT
Start: 2024-06-21

## 2024-06-21 RX ORDER — AMOXICILLIN 250 MG
1 CAPSULE ORAL DAILY
Status: DISCONTINUED | OUTPATIENT
Start: 2024-06-21 | End: 2024-06-23

## 2024-06-21 RX ADMIN — CARVEDILOL 25 MG: 25 TABLET, FILM COATED ORAL at 09:06

## 2024-06-21 RX ADMIN — FAMOTIDINE 20 MG: 20 TABLET ORAL at 08:06

## 2024-06-21 RX ADMIN — METOLAZONE 5 MG: 2.5 TABLET ORAL at 08:06

## 2024-06-21 RX ADMIN — CARVEDILOL 25 MG: 25 TABLET, FILM COATED ORAL at 08:06

## 2024-06-21 RX ADMIN — INSULIN ASPART 2 UNITS: 100 INJECTION, SOLUTION INTRAVENOUS; SUBCUTANEOUS at 06:06

## 2024-06-21 RX ADMIN — INSULIN ASPART 4 UNITS: 100 INJECTION, SOLUTION INTRAVENOUS; SUBCUTANEOUS at 12:06

## 2024-06-21 RX ADMIN — GUAIFENESIN 200 MG: 200 SOLUTION ORAL at 09:06

## 2024-06-21 RX ADMIN — INSULIN ASPART 1 UNITS: 100 INJECTION, SOLUTION INTRAVENOUS; SUBCUTANEOUS at 09:06

## 2024-06-21 RX ADMIN — MUPIROCIN 1 G: 20 OINTMENT TOPICAL at 09:06

## 2024-06-21 RX ADMIN — ENOXAPARIN SODIUM 135 MG: 150 INJECTION SUBCUTANEOUS at 09:06

## 2024-06-21 RX ADMIN — GUAIFENESIN 200 MG: 200 SOLUTION ORAL at 01:06

## 2024-06-21 RX ADMIN — SENNOSIDES AND DOCUSATE SODIUM 1 TABLET: 50; 8.6 TABLET ORAL at 12:06

## 2024-06-21 RX ADMIN — FLUCONAZOLE 100 MG: 100 TABLET ORAL at 08:06

## 2024-06-21 RX ADMIN — MUPIROCIN 1 G: 20 OINTMENT TOPICAL at 08:06

## 2024-06-21 NOTE — TELEPHONE ENCOUNTER
Refill Routing Note   Medication(s) are not appropriate for processing by [facility] for the following reason(s):        Outside of protocol    [facility] action(s):  Route             Appointments  past 12m or future 3m with PCP    Date Provider   Last Visit   11/15/2023 Tony Sadler MD   Next Visit   Visit date not found Tony Sadler MD   ED visits in past 90 days: 0        Note composed:7:17 AM 06/21/2024

## 2024-06-21 NOTE — ASSESSMENT & PLAN NOTE
Likely multifactorial from CHF with worsening CKD  EF 40-45% with elevated BNP  Currently on Furosemide and metolazone with poor urine output,plan for dialysis on 6/22 with consult to vascular surgery for tunnel line  Nephrology is following

## 2024-06-21 NOTE — CARE UPDATE
06/21/24 1128   Patient Assessment/Suction   Level of Consciousness (AVPU) alert   Respiratory Effort Normal;Unlabored   Expansion/Accessory Muscles/Retractions no use of accessory muscles;no retractions;expansion symmetric   All Lung Fields Breath Sounds clear   PRE-TX-O2   Device (Oxygen Therapy) nasal cannula with humidification   Incentive Spirometer   $ Incentive Spirometer Charges done with encouragement   Administration (IS) instruction provided, initial   Number of Repetitions (IS) 10   Level Incentive Spirometer (mL) 700   Patient Tolerance (IS) fair

## 2024-06-21 NOTE — SUBJECTIVE & OBJECTIVE
Interval History: Patient said she is still not feeling well.    Review of Systems   All other systems reviewed and are negative.    Objective:     Vital Signs (Most Recent):  Temp: 96.6 °F (35.9 °C) (06/21/24 1719)  Pulse: 102 (06/21/24 1719)  Resp: 20 (06/21/24 1719)  BP: (!) 129/97 (06/21/24 1146)  SpO2: (!) 91 % (06/21/24 1719) Vital Signs (24h Range):  Temp:  [96.6 °F (35.9 °C)-97.7 °F (36.5 °C)] 96.6 °F (35.9 °C)  Pulse:  [] 102  Resp:  [18-20] 20  SpO2:  [91 %-97 %] 91 %  BP: ()/(75-97) 129/97     Weight: (!) 147.3 kg (324 lb 11.8 oz)  Body mass index is 50.86 kg/m².    Intake/Output Summary (Last 24 hours) at 6/21/2024 1819  Last data filed at 6/21/2024 1626  Gross per 24 hour   Intake 404.33 ml   Output 850 ml   Net -445.67 ml         Physical Exam  Constitutional:       Appearance: She is ill-appearing.   Cardiovascular:      Pulses: Normal pulses.   Pulmonary:      Breath sounds: Rales present.   Musculoskeletal:         General: Swelling present.   Neurological:      Mental Status: She is oriented to person, place, and time.             Significant Labs: All pertinent labs within the past 24 hours have been reviewed.    Significant Imaging: I have reviewed all pertinent imaging results/findings within the past 24 hours.

## 2024-06-21 NOTE — PT/OT/SLP PROGRESS
Occupational Therapy      Patient Name:  Juhi Taylor   MRN:  [id number]    Patient not seen today secondary to Patient unwilling to participate. Will follow-up next scheduled treatment date.    6/21/2024

## 2024-06-21 NOTE — ASSESSMENT & PLAN NOTE
Patient has severe fluid overload secondary to ESRD, no need hemodialysis  Likely multifactorial from CHF with worsening CKD  EF 40-45% with elevated BNP  Currently on Furosemide and metolazone with poor urine output,plan for dialysis on 6/22 ,    Very poor response to Lasix drip  Status post tunnel line 6/22  Nephrology is following

## 2024-06-21 NOTE — PROGRESS NOTES
Nephrology Consult Note        Patient Name: Juhi Taylor  MRN: [id number]    Patient Class: IP- Inpatient   Admission Date: 6/18/2024  Length of Stay: 2 days  Date of Service: 6/21/2024    Attending Physician: Vani King DO  Primary Care Provider: Tony Sadler MD    Reason for Consult: [email or web address]    SUBJECTIVE:     HPI: 73F fallowed with Dr. Martinez from nephrology for advanced CKD stage 4-5 getting admitted with fluid overload. Pt had PD catheter insertion 2 weeks ago in preparation for initiation of dialysis, however had post-operative issues with increased leakage. Then her PD in initiation and training was delayed due to insurance issues. She has oliguria/increased weight gain in past 2 weeks, SOB on exertion and cough. Symptoms got worse and she came to ER and got admitted.     I saw her in the ER and recommend trial of lasix gttcassia. Will consult Dr. Catalan to evaluate patient and make recommendation for catheter care - we probably will not be able to use it for now for dialysis.    6/19 VSS. Only 175cc UO documented, but may be inaccurately charted - pt boarding in ER. Will increase lasix gtt to 10mg/hr and ensure at least q shift UO documentation.    6/20 VSS. BP low, stop Clonidine patch. Increase lasix gtt to 15mg/hr, add metolazone. Stop Fluconazole in 1-2 days after stopping IV abx for suspected PNA. Appreciate Surgery input - will d/w patient initiation of HD tomorrow if unable to produce much UO and symptomatic improvement...    6/21 VSS. Will discuss initiation of dialysis via HD, she is not responding to diuretics.    ASSESSMENT/PLAN:     CKD stage 5  Volume overload  CHF exacerbation  Uremia  PD cathether in place  No NSAIDs, ACEI/ARB, IV contrast or other nephrotoxins.  Keep MAP > 60, SBP > 100.  Dose meds for GFR < 30 ml/min.  Renal diet - low K, low phos.  Lasix gtt antony, if not working consider hemodialysis or PD.    Not responding to diuretics, will d/w patient  "initiation of HD tomorrow.    Anemia of CKD  Hgb and HCT are acceptable. Monitor for now.  Will provide PONCE and/or IV iron as needed to keep Hgb 8-10 range.    MBD / Secondary HPT  Ca, Phos, PTH and vitamin D levels are acceptable.   Phos binders, vitamin D and analogues, calcimimetics will be given as needed.    HTN  Tolerate asymptomatic HTN up to -160.  Continue home meds - stop Clonidine patch, keep PRN + Coreg.  Low sodium diet as tolerated.    Thank you for allowing us to participate in the care of your patient!   We will follow the patient and provide recommendations as needed.         Laboratory:  Recent Labs   Lab 06/19/24 0443 06/20/24 0402 06/21/24  0519    141 136   K 4.6 4.6 5.0    105 105   CO2 27 27 22*   BUN 88* 103* 112*   CREATININE 4.2* 4.3* 4.8*   * 158* 151*       Recent Labs   Lab 06/19/24 0443 06/20/24 0402 06/21/24  0519   CALCIUM 9.6 9.6 9.1   ALBUMIN 3.4* 3.2* 3.2*   MG 2.5 2.5  --        Recent Labs   Lab 10/10/23  1113 01/22/24  1133 05/08/24  1139   PTH, Intact 583.1 H 506.7 H 291.7 H       No results for input(s): "POCTGLUCOSE" in the last 168 hours.    Recent Labs   Lab 07/05/23  1533 01/22/24  1133 06/19/24  0443   Hemoglobin A1C 5.6 5.4 6.8 H       Recent Labs   Lab 06/19/24  0443 06/20/24  0402 06/21/24  1030   WBC 9.74 10.71 9.41   HGB 12.3 11.7* 11.9*   HCT 42.1 40.6 41.3    216 212   MCV 88 87 87   MCHC 29.2* 28.8* 28.8*   MONO 8.8  0.9 7.4  0.8 6.5  0.6   EOSINOPHIL 1.0 0.2 1.2       Recent Labs   Lab 06/19/24 0443 06/20/24 0402 06/21/24  0519   BILITOT 0.8 0.7 0.5   PROT 6.1 5.9* 6.1   ALBUMIN 3.4* 3.2* 3.2*   ALKPHOS 84 76 84   ALT 16 15 10   AST 14 10 15       Recent Labs   Lab 03/17/22  2240 04/07/22  1536 04/17/23  1448 07/14/23  2026 01/22/24  1129 03/14/24  2254 05/09/24  1029   Color, UA Colorless A   < > Colorless A  --   --  Yellow Yellow   Appearance, UA Clear   < > Clear  --   --  Clear Clear   pH, UA 5.0   < > 6.0  --   --  " 6.0 6.0   Specific [place], UA 1.010   < > 1.010  --   --  1.010 1.015   Protein, UA Negative   < > 1+ A  --   --  1+ A 1+ A   Glucose, UA Negative   < > Negative  --   --  Negative Negative   Ketones, UA Negative   < > Negative  --   --  Negative Negative   Urobilinogen, UA Negative  --   --   --   --  Negative  --    Bilirubin (UA) Negative   < > Negative  --   --  Negative Negative   Occult Blood UA Negative   < > Negative  --   --  TRACE Negative   Nitrite, UA Negative   < > Negative  --   --  Negative Negative   RBC, UA  --    < > 3   < > 1 3 1   WBC, UA  --    < > 3   < > 1 5 1   Bacteria  --    < > None  --  Occasional None Rare   Hyaline Casts, UA  --    < > 0  --   --  0 0    < > = values in this interval not displayed.             Microbiology Results (last 7 days)       ** No results found for the last 168 hours. **            Review of patient's allergies indicates:   Allergen Reactions    Percocet [oxycodone-acetaminophen] Itching    Amiodarone analogues      Itching      Irbesartan Swelling    Januvia [sitagliptin]     Jardiance [empagliflozin]      Leg cramps    Lipitor [atorvastatin] Other (See Comments)     Severe leg pain    Linaclotide Other (See Comments) and Nausea And Vomiting     Does not remember    Lubiprostone Other (See Comments) and Palpitations     Does not remember       Outpatient meds:  No current facility-administered medications on file prior to encounter.     Current Outpatient Medications on File Prior to Encounter   Medication Sig Dispense Refill    allopurinoL (ZYLOPRIM) 300 MG tablet Take 1 tablet (300 mg total) by mouth once daily. 90 tablet 3    amLODIPine (NORVASC) 10 MG tablet Take 10 mg by mouth once daily.      calcitRIOL (ROCALTROL) 0.5 MCG Cap Take 0.5 mcg by mouth once daily.      carvediloL (COREG) 25 MG tablet Take 1 tablet (25 mg total) by mouth 2 (two) times daily. 180 tablet 2    cyanocobalamin (VITAMIN B-12) 100 MCG tablet Take 100 mcg by mouth once daily.       evolocumab (REPATHA SURECLICK) 140 mg/mL PnIj Inject 1 mL (140 mg total) into the skin every 14 (fourteen) days. 2 mL 11    furosemide (LASIX) 40 MG tablet Take 40 mg by mouth once daily.      loratadine (CLARITIN) 10 mg tablet Take 10 mg by mouth once daily.      magnesium oxide (MAG-OX) 400 mg (241.3 mg magnesium) tablet Take 1 tablet (400 mg total) by mouth daily as needed (cramping). 30 tablet 0    [DISCONTINUED] apixaban (ELIQUIS) 5 mg Tab Take 1 tablet (5 mg total) by mouth 2 (two) times daily. 180 tablet 2    blood sugar diagnostic (TRUE METRIX GLUCOSE TEST STRIP) Strp Use 1x daily. Insurance preferred. 100 strip 3    blood sugar diagnostic Strp To check BG 1 time daily, to use with insurance preferred meter 100 strip 3    cloNIDine (CATAPRES) 0.1 MG tablet Take 1 tablet (0.1 mg total) by mouth 3 (three) times daily as needed (PRN SBP > 165 mmHg). 90 tablet 6    cloNIDine 0.1 mg/24 hr td ptwk (CATAPRES) 0.1 mg/24 hr Place 1 patch onto the skin every 7 days. 4 patch 4    lancets Misc To check BG 1 times daily, to use with insurance preferred meter 100 each 3    nitroGLYCERIN (NITROSTAT) 0.4 MG SL tablet Place 1 tablet (0.4 mg total) under the tongue every 5 (five) minutes as needed for Chest pain. 25 tablet 0    [DISCONTINUED] aspirin (ECOTRIN) 81 MG EC tablet Take 1 tablet (81 mg total) by mouth once daily. 90 tablet 3    [DISCONTINUED] DULoxetine (CYMBALTA) 20 MG capsule TAKE ONE CAPSULE BY MOUTH ONCE DAILY 30 capsule 4    [DISCONTINUED] folic acid (FOLVITE) 1 MG tablet Take 1 mg by mouth once daily.      [DISCONTINUED] metFORMIN (GLUCOPHAGE-XR) 500 MG ER 24hr tablet Take 2 tablets (1,000 mg total) by mouth 2 (two) times daily with meals. 360 tablet 3    [DISCONTINUED] sodium bicarbonate 650 MG tablet Take 1 tablet (650 mg total) by mouth once daily. 30 tablet 11       Scheduled meds:   carvediloL  25 mg Oral BID    enoxparin  1 mg/kg Subcutaneous Q24H (treatment, non-standard time)    famotidine  20 mg Oral  Daily    fluconazole  100 mg Oral Daily    metOLazone  5 mg Oral Daily    mupirocin   Nasal BID    senna-docusate 8.6-50 mg  1 tablet Oral Daily       Infusions:   furosemide (LASIX) 2 mg/mL continuous infusion (non-titrating)  15 mg/hr Intravenous Continuous 7.5 mL/hr at 06/20/24 2323 15 mg/hr at 06/20/24 2323       PRN meds:    Current Facility-Administered Medications:     acetaminophen, 650 mg, Oral, Q8H PRN    aluminum-magnesium hydroxide-simethicone, 30 mL, Oral, QID PRN    cloNIDine, 0.1 mg, Oral, Q6H PRN    dextrose 50%, 12.5 g, Intravenous, PRN    dextrose 50%, 25 g, Intravenous, PRN    glucagon (human recombinant), 1 mg, Intramuscular, PRN    glucose, 16 g, Oral, PRN    glucose, 24 g, Oral, PRN    hydrALAZINE, 10 mg, Intravenous, Q6H PRN    HYDROcodone-acetaminophen, 1 tablet, Oral, Q6H PRN    insulin aspart U-100, 0-10 Units, Subcutaneous, QID (AC + HS) PRN    melatonin, 6 mg, Oral, Nightly PRN    ondansetron, 4 mg, Intravenous, Q6H PRN    Past Medical History:   Diagnosis Date    Anticoagulant long-term use     Arthritis     Breast cancer 2014    invasive lobular carcinoma    Cancer of kidney 11/2020    RIGHT KIDNEY CANCER    CHF (congestive heart failure)     Coronary artery disease dx 2005    Depression     Diabetes mellitus     Diastolic heart failure secondary to hypertension     Gout     Hyperlipemia     Hypertension     Hypertrophy of nasal turbinates     Kidney mass 2020    Right    Levoscoliosis     Lung nodule     left    Multiple thyroid nodules     NICOLE (obstructive sleep apnea)     uses C-PAP    Pulmonary hypertension      Past Surgical History:   Procedure Laterality Date    AORTOGRAPHY N/A 12/04/2020    Procedure: Aortogram;  Surgeon: Paul Pedersen MD;  Location: [facility];  Service: Cardiology;  Laterality: N/A;    BREAST SURGERY      CARDIAC CATHETERIZATION  12/2020    CHOLECYSTECTOMY      COLONOSCOPY      multi -last 2014     CORONARY ARTERY BYPASS GRAFT      ESOPHAGOGASTRODUODENOSCOPY    "   2012     HAND SURGERY Right     INSERTION, CATHETER, DIALYSIS, PERITONEAL N/A 2024    Procedure: IN Insertion Catheter, Dialysis, Peritoneal - laparoscopic;  Surgeon: Rodriguez Catalan Jr., MD;  Location: [facility] OR;  Service: General;  Laterality: N/A;    LAPAROSCOPIC ROBOT-ASSISTED SURGICAL REMOVAL OF KIDNEY USING DA CHELLE XI Right 03/10/2022    Procedure: XI ROBOTIC NEPHRECTOMY- radical;  Surgeon: Rolando Ramirez MD;  Location: [facility] OR;  Service: Urology;  Laterality: Right;    MASTECTOMY W/ SENTINEL NODE BIOPSY Bilateral 2015    bilateral "dog ears"    NASAL SINUS SURGERY      Dr Bryant FESS/cauterization turbinate     PARTIAL HYSTERECTOMY      PERCUTANEOUS TRANSLUMINAL BALLOON ANGIOPLASTY OF CORONARY ARTERY  2020    Procedure: Angioplasty-coronary;  Surgeon: Paul Pedersen MD;  Location: [facility] CATH;  Service: Cardiology;;    RENAL BIOPSY Right     2021 EJ    TUBAL LIGATION      ULTRASOUND GUIDANCE  2020    Procedure: Ultrasound Guidance;  Surgeon: Paul Pedersen MD;  Location: [facility] CATH;  Service: Cardiology;;     Family History   Problem Relation Name Age of Onset    Breast cancer Mother      Stroke Father Waqar Sr.     Hypertension Father Waqar Sr.     Hepatitis Brother      Asthma Daughter Nell Taylor     Birth defects Daughter Nell Camejo's two children has cleft lips    Depression Daughter Nell Taylor     Drug abuse Daughter Nell Taylor     Learning disabilities Daughter Nell Taylor     Mental illness Daughter Nell Taylor     Breast cancer Maternal Aunt      Glaucoma Sister      Drug abuse Daughter Nell     Macular degeneration Neg Hx      Retinal detachment Neg Hx       Social History     Tobacco Use    Smoking status: Former     Current packs/day: 0.00     Types: Cigarettes     Start date: 2016     Quit date: 2016     Years since quittin.4    Smokeless tobacco: Never    Tobacco comments:     quit  "   Substance Use Topics    Alcohol use: No    Drug use: No       OBJECTIVE:     Vital Signs and IO:  Temp:  [97.5 °F (36.4 °C)-98 °F (36.7 °C)]   Pulse:  []   Resp:  [18-20]   BP: ()/(62-97)   SpO2:  [93 %-97 %]   I/O last 3 completed shifts:  In: 300 [P.O.:240; I.V.:60]  Out: 650 [Urine:650]  Wt Readings from Last 5 Encounters:   06/19/24 (!) 147.3 kg (324 lb 11.8 oz)   06/19/24 132.5 kg (292 lb 1.8 oz)   06/04/24 134.3 kg (296 lb)   05/27/24 134.3 kg (296 lb)   04/01/24 131.7 kg (290 lb 4.8 oz)     Body mass index is 50.86 kg/m².    Physical Exam  Constitutional:       General: Patient is not in acute distress.     Appearance: Patient is well-developed. She is not diaphoretic.   HENT:      Head: Normocephalic and atraumatic.      Mouth/Throat: Mucous membranes are moist.   Eyes:      General: No scleral icterus.     Pupils: Pupils are equal, round, and reactive to light.   Cardiovascular:      Rate and Rhythm: Normal rate and regular rhythm.   Pulmonary:      Effort: Pulmonary effort is normal. No respiratory distress.      Breath sounds: No stridor.   Abdominal:      General: There is no distension.      Palpations: Abdomen is soft.   Musculoskeletal:         General: No deformity. Normal range of motion.      Cervical back: Neck supple.   Skin:     General: Skin is warm and dry.      Findings: No rash present. No erythema.   Neurological:      Mental Status: Patient is alert and oriented to person, place, and time.      Cranial Nerves: No cranial nerve deficit.   Psychiatric:         Behavior: Behavior normal.          Patient care time was spent personally by me on the following activities:     Obtaining a history.  Examination of patient.  Providing medical care at the patients bedside.  Developing a treatment plan with patient or surrogate and bedside caregivers.  Ordering and reviewing laboratory studies, radiographic studies, pulse oximetry.  Ordering and performing treatments and  interventions.  Evaluation of patient's response to treatment.  Discussions with consultants while on the unit and immediately available to the patient.  Re-evaluation of the patient's condition.  Documentation in the medical record.     Paolo Martinez MD    [facility]  [address] [place] [id number]    [phone] - tel  [phone] - fax    6/21/2024

## 2024-06-21 NOTE — CARE UPDATE
06/21/24 0749   Patient Assessment/Suction   Level of Consciousness (AVPU) alert   Respiratory Effort Normal;Unlabored   Expansion/Accessory Muscles/Retractions no use of accessory muscles;no retractions;expansion symmetric   All Lung Fields Breath Sounds clear   PRE-TX-O2   Device (Oxygen Therapy) nasal cannula with humidification   $ Is the patient on Low Flow Oxygen? Yes   Flow (L/min) (Oxygen Therapy) 2   SpO2 97 %   Pulse Oximetry Type Intermittent   $ Pulse Oximetry - Multiple Charge Pulse Oximetry - Multiple

## 2024-06-21 NOTE — ASSESSMENT & PLAN NOTE
EF 40-45% with elevated BNP  Currently on Furosemide and metolazone with poor urine output,plan for dialysis on 6/22 with consult to vascular surgery for tunnel line   Nephrology is following

## 2024-06-21 NOTE — PLAN OF CARE
Problem: Infection  Goal: Absence of Infection Signs and Symptoms  Outcome: Progressing     Problem: Adult Inpatient Plan of Care  Goal: Plan of Care Review  Outcome: Progressing  Goal: Patient-Specific Goal (Individualized)  Outcome: Progressing  Goal: Absence of Hospital-Acquired Illness or Injury  Outcome: Progressing  Goal: Optimal Comfort and Wellbeing  Outcome: Progressing  Goal: Readiness for Transition of Care  Outcome: Progressing     Problem: Bariatric Environmental Safety  Goal: Safety Maintained with Care  Outcome: Progressing     Problem: Fall Injury Risk  Goal: Absence of Fall and Fall-Related Injury  Outcome: Progressing     Problem: Skin Injury Risk Increased  Goal: Skin Health and Integrity  Outcome: Progressing     Problem: Diabetes Comorbidity  Goal: Blood Glucose Level Within Targeted Range  Outcome: Progressing     Problem: Wound  Goal: Optimal Coping  Outcome: Progressing  Goal: Optimal Functional Ability  Outcome: Progressing  Goal: Absence of Infection Signs and Symptoms  Outcome: Progressing  Goal: Improved Oral Intake  Outcome: Progressing  Goal: Optimal Pain Control and Function  Outcome: Progressing  Goal: Skin Health and Integrity  Outcome: Progressing  Goal: Optimal Wound Healing  Outcome: Progressing

## 2024-06-21 NOTE — PROGRESS NOTES
[facility]  Progress Note    Patient Name: Juhi Taylor  MRN: [id number]  Patient Class: IP- Inpatient   Admission Date: 6/18/2024  Length of Stay: 2 days  Attending Physician: Vani King DO  Primary Care Provider: Tony Sadler MD        Subjective:     Principal Problem:Acute hypoxic respiratory failure        HPI:  73 year old pt getting admitted with fluid overload in need of dialysis  Pt had PD catheter insertion 2 weeks ago  She was supposed to start on PD for ESRD but got delayed due to insurance issues  She has oliguria/increased weight gain in past 2 weeks  Also SOB on exertion and cough  Symptoms got worse and she came to ER and got admitted  Pt was started on lasix gtt iv         Overview/Hospital Course:  Patient is a 73-year-old female who was admitted for acute hypoxic respiratory failure likely multifactorial from CKD and CHF .  Patient had PD cath placed 2 weeks ago however due to insurance situation never received dialysis. EF 40 -45% with elevated BNP. Patient was placed on Furosemide and metolazone with poor urine output eventually leading to dialysis.Nephrology is following. Discontinued antibiotics since infection was ruled out.     No new subjective & objective note has been filed under this hospital service since the last note was generated.      Assessment/Plan:      * Acute hypoxic respiratory failure  Likely multifactorial from CHF with worsening CKD  EF 40-45% with elevated BNP  Currently on Furosemide and metolazone with poor urine output,plan for dialysis on 6/22 ,    vascular surgery consulted for tunnel line  Nephrology is following      Chronic kidney disease (CKD), stage V  Patient had PD cath placed 2 weeks ago however due to insurance situation never received dialysis  See under respiratory failure       CHF (congestive heart failure)  EF 40-45% with elevated BNP  Currently on Furosemide and metolazone with poor urine  "output,plan for dialysis on 6/22 with consult to vascular surgery for tunnel line   Nephrology is following       Renal cell carcinoma of right kidney  Aware       Paroxysmal atrial fibrillation  Chronic stable issue  Continue carvedilol, Holding home apixaban for now      Coronary artery disease  Stable  Continue with medical management    Essential hypertension  Chronic, controlled. Latest blood pressure and vitals reviewed-     Temp:  [98.6 °F (37 °C)]   Pulse:  [104-106]   Resp:  [20-22]   BP: (138-162)/()   SpO2:  [89 %-97 %] .   Home meds for hypertension were reviewed and noted below.   Hypertension Medications               carvediloL (COREG) 25 MG tablet Take 1 tablet (25 mg total) by mouth 2 (two) times daily.    cloNIDine (CATAPRES) 0.1 MG tablet Take 1 tablet (0.1 mg total) by mouth 3 (three) times daily as needed (PRN SBP > 165 mmHg).    cloNIDine 0.1 mg/24 hr td ptwk (CATAPRES) 0.1 mg/24 hr Place 1 patch onto the skin every 7 days.    furosemide (LASIX) 40 MG tablet Take 40 mg by mouth once daily.    amLODIPine (NORVASC) 10 MG tablet Take 10 mg by mouth once daily.    nitroGLYCERIN (NITROSTAT) 0.4 MG SL tablet Place 1 tablet (0.4 mg total) under the tongue every 5 (five) minutes as needed for Chest pain.            While in the hospital, will manage blood pressure as follows; Continue home antihypertensive regimen    Will utilize p.r.n. blood pressure medication only if patient's blood pressure greater than 140/90 and she develops symptoms such as worsening chest pain or shortness of breath.    Type 2 diabetes mellitus with hyperglycemia  Patient's FSGs are controlled on current medication regimen.  Last A1c reviewed-   Lab Results   Component Value Date    HGBA1C 5.4 01/22/2024     Most recent fingerstick glucose reviewed- No results for input(s): "POCTGLUCOSE" in the last 24 hours.  Current correctional scale  Medium  Maintain anti-hyperglycemic dose as follows-   Antihyperglycemics (From " admission, onward)      Start     Stop Route Frequency Ordered    06/18/24 1619  insulin aspart U-100 pen 0-10 Units         -- SubQ Before meals & nightly PRN 06/18/24 1519          Hold Oral hypoglycemics while patient is in the hospital.    Morbid obesity  Body mass index is 45.73 kg/m². Morbid obesity complicates all aspects of disease management from diagnostic modalities to treatment.       VTE Risk Mitigation (From admission, onward)           Ordered     enoxaparin injection 135 mg  Every 24 hours         06/18/24 1832     IP VTE HIGH RISK PATIENT  Once         06/18/24 1519     Place sequential compression device  Until discontinued         06/18/24 1519                    Discharge Planning   HARINI: 6/26/2024     Code Status: Full Code   Is the patient medically ready for discharge?:     Reason for patient still in hospital (select all that apply): Patient trending condition  Discharge Plan A: Home with family                  Vani King DO  Department of Hospital Medicine   Critical access hospital

## 2024-06-21 NOTE — PLAN OF CARE
06/20/24 2150   Patient Assessment/Suction   Level of Consciousness (AVPU) alert   Respiratory Effort Unlabored   All Lung Fields Breath Sounds clear;diminished   Rhythm/Pattern, Respiratory pattern regular;unlabored;depth regular;no shortness of breath reported   Cough Frequency frequent   PRE-TX-O2   Device (Oxygen Therapy) nasal cannula   $ Is the patient on Low Flow Oxygen? Yes   Flow (L/min) (Oxygen Therapy) 2   SpO2 95 %   Pulse Oximetry Type Intermittent   $ Pulse Oximetry - Multiple Charge Pulse Oximetry - Multiple   Pulse 100   Resp 18   Home Oxygen   Has Home Oxygen? Yes   Liter Flow 2   Duration continuous   At rest without oxygen qualifying SpO2% 85   Route nasal cannula   Mode continuous   Device home concentrator with portable unit

## 2024-06-21 NOTE — PT/OT/SLP PROGRESS
"Physical Therapy Treatment    Patient Name:  Juhi Taylor   MRN:  [id number]    Recommendations:     Discharge Recommendations: High Intensity Therapy (vs Low pending progress)  Discharge Equipment Recommendations: to be determined by next level of care  Barriers to discharge:  decreased mobility from baseline    Assessment:     Juhi Taylor is a 73 y.o. female admitted with a medical diagnosis of Acute hypoxic respiratory failure.  She presents with the following impairments/functional limitations: weakness, impaired endurance, impaired functional mobility, impaired self care skills, gait instability, decreased safety awareness, impaired cardiopulmonary response to activity.    Pt reluctant to participate but could be encouraged to transfer to chair to allow for bedding change. Bed mobility CGA. Transferred to chair with RW and CGA x2 persons. Was left with legs elevated to footstool, chair alarm active, all needs at hand, and nurse aware of pt status.      Rehab Prognosis: Good; patient would benefit from acute skilled PT services to address these deficits and reach maximum level of function.    Recent Surgery: * No surgery found *      Plan:     During this hospitalization, patient to be seen 6 x/week to address the identified rehab impairments via gait training, therapeutic activities, therapeutic exercises and progress toward the following goals:    Plan of Care Expires:  07/19/24    Subjective     Chief Complaint: fatigue  Patient/Family Comments/goals: get bedding changed while she is up in chair. "It actually does feel a little better to be sitting up now"   Pain/Comfort:  Pain Rating 1: 0/10  Pain Rating Post-Intervention 1: 0/10      Objective:     Communicated with nurse prior to session.  Patient found HOB elevated with blood pressure cuff, antony catheter, oxygen, peripheral IV, telemetry upon PT entry to room.     General Precautions: Standard, fall  Orthopedic Precautions: N/A  Braces: " N/A  Respiratory Status: Nasal cannula, flow 2 L/min     Functional Mobility:  Bed Mobility:     Supine to Sit: contact guard assistance  Transfers:     Sit to Stand:  contact guard assistance and of 2 persons with rolling walker  Bed to Chair: contact guard assistance and of 2 persons with  rolling walker  using  Stand Pivot      AM-PAC 6 CLICK MOBILITY          Treatment & Education:  -Pt educ: benefits of participation with therapy, OOB to chair during the day as tolerated, call light, fall prevention    Patient left up in chair with all lines intact, call button in reach, chair alarm on, and nurse notified..    GOALS:   Multidisciplinary Problems       Physical Therapy Goals          Problem: Physical Therapy    Goal Priority Disciplines Outcome Goal Variances Interventions   Physical Therapy Goal     PT, PT/OT      Description: Goals to be met by: 24     Patient will increase functional independence with mobility by performin. Supine to sit with Supervision  2. Sit to stand transfer with Supervision  3. Bed to chair transfer with Supervision using Rolling Walker  4. Gait  x 150 feet with Supervision using Rolling Walker.                              Time Tracking:     PT Received On: 24  PT Start Time: 1420     PT Stop Time: 1435  PT Total Time (min): 15 min     Billable Minutes: Therapeutic Activity 15    Treatment Type: Treatment  PT/PTA: PTA     Number of PTA visits since last PT visit: 2024

## 2024-06-22 LAB
ALBUMIN SERPL BCP-MCNC: 3.2 G/DL (ref 3.5–5.2)
ALP SERPL-CCNC: 84 U/L (ref 55–135)
ALT SERPL W/O P-5'-P-CCNC: 13 U/L (ref 10–44)
ANION GAP SERPL CALC-SCNC: 9 MMOL/L (ref 8–16)
AST SERPL-CCNC: 11 U/L (ref 10–40)
BASOPHILS # BLD AUTO: 0.01 K/UL (ref 0–0.2)
BASOPHILS NFR BLD: 0.1 % (ref 0–1.9)
BILIRUB SERPL-MCNC: 0.6 MG/DL (ref 0.1–1)
BUN SERPL-MCNC: 119 MG/DL (ref 8–23)
CALCIUM SERPL-MCNC: 9.3 MG/DL (ref 8.7–10.5)
CHLORIDE SERPL-SCNC: 102 MMOL/L (ref 95–110)
CO2 SERPL-SCNC: 27 MMOL/L (ref 23–29)
CREAT SERPL-MCNC: 5.1 MG/DL (ref 0.5–1.4)
DIFFERENTIAL METHOD BLD: ABNORMAL
EOSINOPHIL # BLD AUTO: 0.2 K/UL (ref 0–0.5)
EOSINOPHIL NFR BLD: 1.9 % (ref 0–8)
ERYTHROCYTE [DISTWIDTH] IN BLOOD BY AUTOMATED COUNT: 19 % (ref 11.5–14.5)
EST. GFR  (NO RACE VARIABLE): 8.5 ML/MIN/1.73 M^2
GLUCOSE SERPL-MCNC: 117 MG/DL (ref 70–110)
GLUCOSE SERPL-MCNC: 130 MG/DL (ref 70–110)
GLUCOSE SERPL-MCNC: 131 MG/DL (ref 70–110)
GLUCOSE SERPL-MCNC: 162 MG/DL (ref 70–110)
HCT VFR BLD AUTO: 41.3 % (ref 37–48.5)
HGB BLD-MCNC: 11.9 G/DL (ref 12–16)
IMM GRANULOCYTES # BLD AUTO: 0.04 K/UL (ref 0–0.04)
IMM GRANULOCYTES NFR BLD AUTO: 0.4 % (ref 0–0.5)
LYMPHOCYTES # BLD AUTO: 1.7 K/UL (ref 1–4.8)
LYMPHOCYTES NFR BLD: 17.8 % (ref 18–48)
MAGNESIUM SERPL-MCNC: 2.7 MG/DL (ref 1.6–2.6)
MCH RBC QN AUTO: 25.3 PG (ref 27–31)
MCHC RBC AUTO-ENTMCNC: 28.8 G/DL (ref 32–36)
MCV RBC AUTO: 88 FL (ref 82–98)
MONOCYTES # BLD AUTO: 0.7 K/UL (ref 0.3–1)
MONOCYTES NFR BLD: 7.9 % (ref 4–15)
NEUTROPHILS # BLD AUTO: 6.7 K/UL (ref 1.8–7.7)
NEUTROPHILS NFR BLD: 71.9 % (ref 38–73)
NRBC BLD-RTO: 0 /100 WBC
PLATELET # BLD AUTO: 222 K/UL (ref 150–450)
PMV BLD AUTO: 10.2 FL (ref 9.2–12.9)
POTASSIUM SERPL-SCNC: 4.4 MMOL/L (ref 3.5–5.1)
PROT SERPL-MCNC: 6.1 G/DL (ref 6–8.4)
RBC # BLD AUTO: 4.7 M/UL (ref 4–5.4)
SODIUM SERPL-SCNC: 138 MMOL/L (ref 136–145)
WBC # BLD AUTO: 9.37 K/UL (ref 3.9–12.7)

## 2024-06-22 PROCEDURE — 99223 1ST HOSP IP/OBS HIGH 75: CPT | Mod: 25,ICN,, | Performed by: STUDENT IN AN ORGANIZED HEALTH CARE EDUCATION/TRAINING PROGRAM

## 2024-06-22 PROCEDURE — 83735 ASSAY OF MAGNESIUM: CPT | Performed by: INTERNAL MEDICINE

## 2024-06-22 PROCEDURE — 02H633Z INSERTION OF INFUSION DEVICE INTO RIGHT ATRIUM, PERCUTANEOUS APPROACH: ICD-10-PCS | Performed by: STUDENT IN AN ORGANIZED HEALTH CARE EDUCATION/TRAINING PROGRAM

## 2024-06-22 PROCEDURE — 5A1D70Z PERFORMANCE OF URINARY FILTRATION, INTERMITTENT, LESS THAN 6 HOURS PER DAY: ICD-10-PCS | Performed by: INTERNAL MEDICINE

## 2024-06-22 PROCEDURE — 63600175 PHARM REV CODE 636 W HCPCS: Performed by: INTERNAL MEDICINE

## 2024-06-22 PROCEDURE — 99900031 HC PATIENT EDUCATION (STAT)

## 2024-06-22 PROCEDURE — 80100014 HC HEMODIALYSIS 1:1

## 2024-06-22 PROCEDURE — 80053 COMPREHEN METABOLIC PANEL: CPT | Performed by: INTERNAL MEDICINE

## 2024-06-22 PROCEDURE — 25000003 PHARM REV CODE 250: Performed by: FAMILY MEDICINE

## 2024-06-22 PROCEDURE — 25000003 PHARM REV CODE 250: Performed by: INTERNAL MEDICINE

## 2024-06-22 PROCEDURE — 36558 INSERT TUNNELED CV CATH: CPT | Mod: LT,ICN,, | Performed by: STUDENT IN AN ORGANIZED HEALTH CARE EDUCATION/TRAINING PROGRAM

## 2024-06-22 PROCEDURE — 94799 UNLISTED PULMONARY SVC/PX: CPT

## 2024-06-22 PROCEDURE — 63600175 PHARM REV CODE 636 W HCPCS: Performed by: HOSPITALIST

## 2024-06-22 PROCEDURE — 63600175 PHARM REV CODE 636 W HCPCS: Performed by: STUDENT IN AN ORGANIZED HEALTH CARE EDUCATION/TRAINING PROGRAM

## 2024-06-22 PROCEDURE — 0JH63XZ INSERTION OF TUNNELED VASCULAR ACCESS DEVICE INTO CHEST SUBCUTANEOUS TISSUE AND FASCIA, PERCUTANEOUS APPROACH: ICD-10-PCS | Performed by: STUDENT IN AN ORGANIZED HEALTH CARE EDUCATION/TRAINING PROGRAM

## 2024-06-22 PROCEDURE — 85025 COMPLETE CBC W/AUTO DIFF WBC: CPT | Performed by: INTERNAL MEDICINE

## 2024-06-22 PROCEDURE — 21400001 HC TELEMETRY ROOM

## 2024-06-22 PROCEDURE — 36415 COLL VENOUS BLD VENIPUNCTURE: CPT | Performed by: INTERNAL MEDICINE

## 2024-06-22 PROCEDURE — 99900035 HC TECH TIME PER 15 MIN (STAT)

## 2024-06-22 PROCEDURE — 27000221 HC OXYGEN, UP TO 24 HOURS

## 2024-06-22 PROCEDURE — 25000003 PHARM REV CODE 250: Performed by: STUDENT IN AN ORGANIZED HEALTH CARE EDUCATION/TRAINING PROGRAM

## 2024-06-22 PROCEDURE — 94761 N-INVAS EAR/PLS OXIMETRY MLT: CPT

## 2024-06-22 RX ORDER — LIDOCAINE HYDROCHLORIDE 10 MG/ML
40 INJECTION INFILTRATION; PERINEURAL ONCE
Status: COMPLETED | OUTPATIENT
Start: 2024-06-22 | End: 2024-06-22

## 2024-06-22 RX ORDER — ENOXAPARIN SODIUM 150 MG/ML
1 INJECTION SUBCUTANEOUS EVERY 24 HOURS
Status: DISCONTINUED | OUTPATIENT
Start: 2024-06-22 | End: 2024-06-23

## 2024-06-22 RX ORDER — LORAZEPAM 2 MG/ML
0.5 INJECTION INTRAMUSCULAR ONCE
Status: COMPLETED | OUTPATIENT
Start: 2024-06-22 | End: 2024-06-22

## 2024-06-22 RX ORDER — HEPARIN SODIUM 1000 [USP'U]/ML
4000 INJECTION, SOLUTION INTRAVENOUS; SUBCUTANEOUS ONCE
Status: COMPLETED | OUTPATIENT
Start: 2024-06-22 | End: 2024-06-22

## 2024-06-22 RX ORDER — HYDROMORPHONE HYDROCHLORIDE 1 MG/ML
1 INJECTION, SOLUTION INTRAMUSCULAR; INTRAVENOUS; SUBCUTANEOUS ONCE
Status: COMPLETED | OUTPATIENT
Start: 2024-06-22 | End: 2024-06-22

## 2024-06-22 RX ADMIN — MUPIROCIN 1 G: 20 OINTMENT TOPICAL at 10:06

## 2024-06-22 RX ADMIN — GUAIFENESIN 200 MG: 200 SOLUTION ORAL at 10:06

## 2024-06-22 RX ADMIN — CARVEDILOL 25 MG: 25 TABLET, FILM COATED ORAL at 10:06

## 2024-06-22 RX ADMIN — ENOXAPARIN SODIUM 150 MG: 150 INJECTION SUBCUTANEOUS at 10:06

## 2024-06-22 RX ADMIN — ONDANSETRON 4 MG: 2 INJECTION INTRAMUSCULAR; INTRAVENOUS at 11:06

## 2024-06-22 RX ADMIN — LORAZEPAM 0.5 MG: 2 INJECTION INTRAMUSCULAR; INTRAVENOUS at 11:06

## 2024-06-22 RX ADMIN — HYDROMORPHONE HYDROCHLORIDE 1 MG: 1 INJECTION, SOLUTION INTRAMUSCULAR; INTRAVENOUS; SUBCUTANEOUS at 11:06

## 2024-06-22 RX ADMIN — HEPARIN SODIUM 4000 UNITS: 1000 INJECTION, SOLUTION INTRAVENOUS; SUBCUTANEOUS at 12:06

## 2024-06-22 RX ADMIN — LIDOCAINE HYDROCHLORIDE 40 ML: 10 INJECTION, SOLUTION INFILTRATION; PERINEURAL at 12:06

## 2024-06-22 RX ADMIN — FUROSEMIDE 15 MG/HR: 10 INJECTION, SOLUTION INTRAMUSCULAR; INTRAVENOUS at 04:06

## 2024-06-22 NOTE — PROGRESS NOTES
Nephrology Consult Note        Patient Name: Juhi Taylor  MRN: [id number]    Patient Class: IP- Inpatient   Admission Date: 6/18/2024  Length of Stay: 3 days  Date of Service: 6/22/2024    Attending Physician: Cholo Parekh MD  Primary Care Provider: Tony Sadler MD    Reason for Consult: anasaandresa    SUBJECTIVE:     HPI: 73F fallowed with Dr. Martinez from nephrology for advanced CKD stage 4-5 getting admitted with fluid overload. Pt had PD catheter insertion 2 weeks ago in preparation for initiation of dialysis, however had post-operative issues with increased leakage. Then her PD in initiation and training was delayed due to insurance issues. She has oliguria/increased weight gain in past 2 weeks, SOB on exertion and cough. Symptoms got worse and she came to ER and got admitted.     I saw her in the ER and recommend trial of lasix gtt, cassia. Will consult Dr. Catalan to evaluate patient and make recommendation for catheter care - we probably will not be able to use it for now for dialysis.    6/19 VSS. Only 175cc UO documented, but may be inaccurately charted - pt boarding in ER. Will increase lasix gtt to 10mg/hr and ensure at least q shift UO documentation.    6/20 VSS. BP low, stop Clonidine patch. Increase lasix gtt to 15mg/hr, add metolazone. Stop Fluconazole in 1-2 days after stopping IV abx for suspected PNA. Appreciate Surgery input - will d/w patient initiation of HD tomorrow if unable to produce much UO and symptomatic improvement...    6/21 VSS. Will discuss initiation of dialysis via HD, she is not responding to diuretics.    6/22 VSS. Plan for HD today after dialysis access placement. Seen on HD, BF around 275 cc/min max, otherwise tolerating well. Will have to be repositioned in cath lab by Dr. Griffin on Monday.    ASSESSMENT/PLAN:     CKD stage 5  Volume overload  CHF exacerbation  Uremia  PD cathether in place  No NSAIDs, ACEI/ARB, IV contrast or other nephrotoxins.  Keep  "MAP > 60, SBP > 100.  Dose meds for GFR < 30 ml/min.  Renal diet - low K, low phos.  Lasix gtt, antony, if not working consider hemodialysis or PD.    Not responding to diuretics, patient agrees on initiation of HD today.    Anemia of CKD  Hgb and HCT are acceptable. Monitor for now.  Will provide PONCE and/or IV iron as needed to keep Hgb 8-10 range.    MBD / Secondary HPT  Ca, Phos, PTH and vitamin D levels are acceptable.   Phos binders, vitamin D and analogues, calcimimetics will be given as needed.    HTN  Tolerate asymptomatic HTN up to -160.  Continue home meds - stop Clonidine patch, keep PRN + Coreg.  Low sodium diet as tolerated.    Thank you for allowing us to participate in the care of your patient!   We will follow the patient and provide recommendations as needed.         Laboratory:  Recent Labs   Lab 06/20/24  0402 06/21/24  0519 06/22/24  0526    136 138   K 4.6 5.0 4.4    105 102   CO2 27 22* 27   * 112* 119*   CREATININE 4.3* 4.8* 5.1*   * 151* 131*       Recent Labs   Lab 06/19/24  0443 06/20/24  0402 06/21/24  0519 06/22/24  0526   CALCIUM 9.6 9.6 9.1 9.3   ALBUMIN 3.4* 3.2* 3.2* 3.2*   MG 2.5 2.5  --  2.7*       Recent Labs   Lab 10/10/23  1113 01/22/24  1133 05/08/24  1139   PTH, Intact 583.1 H 506.7 H 291.7 H       No results for input(s): "POCTGLUCOSE" in the last 168 hours.    Recent Labs   Lab 07/05/23  1533 01/22/24  1133 06/19/24  0443   Hemoglobin A1C 5.6 5.4 6.8 H       Recent Labs   Lab 06/20/24  0402 06/21/24  1030 06/22/24  0526   WBC 10.71 9.41 9.37   HGB 11.7* 11.9* 11.9*   HCT 40.6 41.3 41.3    212 222   MCV 87 87 88   MCHC 28.8* 28.8* 28.8*   MONO 7.4  0.8 6.5  0.6 7.9  0.7   EOSINOPHIL 0.2 1.2 1.9       Recent Labs   Lab 06/20/24  0402 06/21/24  0519 06/22/24  0526   BILITOT 0.7 0.5 0.6   PROT 5.9* 6.1 6.1   ALBUMIN 3.2* 3.2* 3.2*   ALKPHOS 76 84 84   ALT 15 10 13   AST 10 15 11       Recent Labs   Lab 03/17/22  2240 04/07/22  1536 " 04/17/23  1448 07/14/23 2026 01/22/24  1129 03/14/24  2254 05/09/24  1029   Color, UA Colorless A   < > Colorless A  --   --  Yellow Yellow   Appearance, UA Clear   < > Clear  --   --  Clear Clear   pH, UA 5.0   < > 6.0  --   --  6.0 6.0   Specific [place], UA 1.010   < > 1.010  --   --  1.010 1.015   Protein, UA Negative   < > 1+ A  --   --  1+ A 1+ A   Glucose, UA Negative   < > Negative  --   --  Negative Negative   Ketones, UA Negative   < > Negative  --   --  Negative Negative   Urobilinogen, UA Negative  --   --   --   --  Negative  --    Bilirubin (UA) Negative   < > Negative  --   --  Negative Negative   Occult Blood UA Negative   < > Negative  --   --  TRACE Negative   Nitrite, UA Negative   < > Negative  --   --  Negative Negative   RBC, UA  --    < > 3   < > 1 3 1   WBC, UA  --    < > 3   < > 1 5 1   Bacteria  --    < > None  --  Occasional None Rare   Hyaline Casts, UA  --    < > 0  --   --  0 0    < > = values in this interval not displayed.             Microbiology Results (last 7 days)       ** No results found for the last 168 hours. **            Review of patient's allergies indicates:   Allergen Reactions    Percocet [oxycodone-acetaminophen] Itching    Amiodarone analogues      Itching      Irbesartan Swelling    Januvia [sitagliptin]     Jardiance [empagliflozin]      Leg cramps    Lipitor [atorvastatin] Other (See Comments)     Severe leg pain    Linaclotide Other (See Comments) and Nausea And Vomiting     Does not remember    Lubiprostone Other (See Comments) and Palpitations     Does not remember       Outpatient meds:  No current facility-administered medications on file prior to encounter.     Current Outpatient Medications on File Prior to Encounter   Medication Sig Dispense Refill    allopurinoL (ZYLOPRIM) 300 MG tablet Take 1 tablet (300 mg total) by mouth once daily. 90 tablet 3    amLODIPine (NORVASC) 10 MG tablet Take 10 mg by mouth once daily.      calcitRIOL (ROCALTROL) 0.5 MCG Cap  Take 0.5 mcg by mouth once daily.      carvediloL (COREG) 25 MG tablet Take 1 tablet (25 mg total) by mouth 2 (two) times daily. 180 tablet 2    cyanocobalamin (VITAMIN B-12) 100 MCG tablet Take 100 mcg by mouth once daily.      evolocumab (REPATHA SURECLICK) 140 mg/mL PnIj Inject 1 mL (140 mg total) into the skin every 14 (fourteen) days. 2 mL 11    furosemide (LASIX) 40 MG tablet Take 40 mg by mouth once daily.      loratadine (CLARITIN) 10 mg tablet Take 10 mg by mouth once daily.      magnesium oxide (MAG-OX) 400 mg (241.3 mg magnesium) tablet Take 1 tablet (400 mg total) by mouth daily as needed (cramping). 30 tablet 0    blood sugar diagnostic (TRUE METRIX GLUCOSE TEST STRIP) Strp Use 1x daily. Insurance preferred. 100 strip 3    blood sugar diagnostic Strp To check BG 1 time daily, to use with insurance preferred meter 100 strip 3    cloNIDine (CATAPRES) 0.1 MG tablet Take 1 tablet (0.1 mg total) by mouth 3 (three) times daily as needed (PRN SBP > 165 mmHg). 90 tablet 6    cloNIDine 0.1 mg/24 hr td ptwk (CATAPRES) 0.1 mg/24 hr Place 1 patch onto the skin every 7 days. 4 patch 4    lancets Misc To check BG 1 times daily, to use with insurance preferred meter 100 each 3    nitroGLYCERIN (NITROSTAT) 0.4 MG SL tablet Place 1 tablet (0.4 mg total) under the tongue every 5 (five) minutes as needed for Chest pain. 25 tablet 0    [DISCONTINUED] aspirin (ECOTRIN) 81 MG EC tablet Take 1 tablet (81 mg total) by mouth once daily. 90 tablet 3    [DISCONTINUED] DULoxetine (CYMBALTA) 20 MG capsule TAKE ONE CAPSULE BY MOUTH ONCE DAILY 30 capsule 4    [DISCONTINUED] folic acid (FOLVITE) 1 MG tablet Take 1 mg by mouth once daily.      [DISCONTINUED] metFORMIN (GLUCOPHAGE-XR) 500 MG ER 24hr tablet Take 2 tablets (1,000 mg total) by mouth 2 (two) times daily with meals. 360 tablet 3    [DISCONTINUED] sodium bicarbonate 650 MG tablet Take 1 tablet (650 mg total) by mouth once daily. 30 tablet 11       Scheduled meds:   carvediloL   25 mg Oral BID    enoxparin  1 mg/kg Subcutaneous Q24H (treatment, non-standard time)    famotidine  20 mg Oral Daily    fluconazole  100 mg Oral Daily    guaiFENesin 100 mg/5 ml  200 mg Oral Q12H    LIDOcaine HCL 10 mg/ml (1%)  40 mL Other Once    metOLazone  5 mg Oral Daily    mupirocin   Nasal BID    senna-docusate 8.6-50 mg  1 tablet Oral Daily       Infusions:   furosemide (LASIX) 2 mg/mL continuous infusion (non-titrating)  15 mg/hr Intravenous Continuous 7.5 mL/hr at 06/22/24 0457 15 mg/hr at 06/22/24 0457       PRN meds:    Current Facility-Administered Medications:     acetaminophen, 650 mg, Oral, Q8H PRN    aluminum-magnesium hydroxide-simethicone, 30 mL, Oral, QID PRN    cloNIDine, 0.1 mg, Oral, Q6H PRN    dextrose 50%, 12.5 g, Intravenous, PRN    dextrose 50%, 25 g, Intravenous, PRN    glucagon (human recombinant), 1 mg, Intramuscular, PRN    glucose, 16 g, Oral, PRN    glucose, 24 g, Oral, PRN    hydrALAZINE, 10 mg, Intravenous, Q6H PRN    HYDROcodone-acetaminophen, 1 tablet, Oral, Q6H PRN    insulin aspart U-100, 0-10 Units, Subcutaneous, QID (AC + HS) PRN    melatonin, 6 mg, Oral, Nightly PRN    ondansetron, 4 mg, Intravenous, Q6H PRN    Past Medical History:   Diagnosis Date    Anticoagulant long-term use     Arthritis     Breast cancer 2014    invasive lobular carcinoma    Cancer of kidney 11/2020    RIGHT KIDNEY CANCER    CHF (congestive heart failure)     Coronary artery disease dx 2005    Depression     Diabetes mellitus     Diastolic heart failure secondary to hypertension     Gout     Hyperlipemia     Hypertension     Hypertrophy of nasal turbinates     Kidney mass 2020    Right    Levoscoliosis     Lung nodule     left    Multiple thyroid nodules     NICOLE (obstructive sleep apnea)     uses C-PAP    Pulmonary hypertension      Past Surgical History:   Procedure Laterality Date    AORTOGRAPHY N/A 12/04/2020    Procedure: Aortogram;  Surgeon: Paul Pedersen MD;  Location: [facility];  Service:  "Cardiology;  Laterality: N/A;    BREAST SURGERY      CARDIAC CATHETERIZATION  12/2020    CHOLECYSTECTOMY      COLONOSCOPY      multi -last 2014     CORONARY ARTERY BYPASS GRAFT      ESOPHAGOGASTRODUODENOSCOPY      2012     HAND SURGERY Right     INSERTION, CATHETER, DIALYSIS, PERITONEAL N/A 6/4/2024    Procedure: IN Insertion Catheter, Dialysis, Peritoneal - laparoscopic;  Surgeon: Rodriguez Catalan Jr., MD;  Location: [facility] OR;  Service: General;  Laterality: N/A;    LAPAROSCOPIC ROBOT-ASSISTED SURGICAL REMOVAL OF KIDNEY USING DA CHELLE XI Right 03/10/2022    Procedure: XI ROBOTIC NEPHRECTOMY- radical;  Surgeon: Rolando Ramirez MD;  Location: [facility] OR;  Service: Urology;  Laterality: Right;    MASTECTOMY W/ SENTINEL NODE BIOPSY Bilateral 01/21/2015    bilateral "dog ears"    NASAL SINUS SURGERY  2015    Dr Bryant FESS/cauterization turbinate     PARTIAL HYSTERECTOMY  1989    PERCUTANEOUS TRANSLUMINAL BALLOON ANGIOPLASTY OF CORONARY ARTERY  12/04/2020    Procedure: Angioplasty-coronary;  Surgeon: Paul Pedersen MD;  Location: [facility] CATH;  Service: Cardiology;;    RENAL BIOPSY Right     9/20/2021 EJ    TUBAL LIGATION      ULTRASOUND GUIDANCE  12/04/2020    Procedure: Ultrasound Guidance;  Surgeon: Paul Pedersen MD;  Location: [facility] CATH;  Service: Cardiology;;     Family History   Problem Relation Name Age of Onset    Breast cancer Mother      Stroke Father Waqar Sr.     Hypertension Father Waqar Sr.     Hepatitis Brother      Asthma Daughter Nell Taylor     Birth defects Daughter Nelliris Taylor         Nell's two children has cleft lips    Depression Daughter Nell Taylor     Drug abuse Daughter Nell Taylor     Learning disabilities Daughter Nell Taylor     Mental illness Daughter Nell Taylor     Breast cancer Maternal Aunt      Glaucoma Sister      Drug abuse Daughter Nell     Macular degeneration Neg Hx      Retinal detachment Neg Hx       Social History     Tobacco Use    Smoking " status: Former     Current packs/day: 0.00     Types: Cigarettes     Start date: 2016     Quit date: 2016     Years since quittin.4    Smokeless tobacco: Never    Tobacco comments:     quit    Substance Use Topics    Alcohol use: No    Drug use: No       OBJECTIVE:     Vital Signs and IO:  Temp:  [96.6 °F (35.9 °C)-98.1 °F (36.7 °C)]   Pulse:  []   Resp:  [18-22]   BP: (114-132)/(74-97)   SpO2:  [91 %-100 %]   I/O last 3 completed shifts:  In: 644.3 [P.O.:480; I.V.:164.3]  Out: 1100 [Urine:1100]  Wt Readings from Last 5 Encounters:   24 (!) 147.9 kg (326 lb 1 oz)   24 132.5 kg (292 lb 1.8 oz)   24 134.3 kg (296 lb)   24 134.3 kg (296 lb)   24 131.7 kg (290 lb 4.8 oz)     Body mass index is 51.07 kg/m².    Physical Exam  Constitutional:       General: Patient is not in acute distress.     Appearance: Patient is well-developed. She is not diaphoretic.   HENT:      Head: Normocephalic and atraumatic.      Mouth/Throat: Mucous membranes are moist.   Eyes:      General: No scleral icterus.     Pupils: Pupils are equal, round, and reactive to light.   Cardiovascular:      Rate and Rhythm: Normal rate and regular rhythm.   Pulmonary:      Effort: Pulmonary effort is normal. No respiratory distress.      Breath sounds: No stridor.   Abdominal:      General: There is no distension.      Palpations: Abdomen is soft.   Musculoskeletal:         General: No deformity. Normal range of motion.      Cervical back: Neck supple.   Skin:     General: Skin is warm and dry.      Findings: No rash present. No erythema.   Neurological:      Mental Status: Patient is alert and oriented to person, place, and time.      Cranial Nerves: No cranial nerve deficit.   Psychiatric:         Behavior: Behavior normal.          Patient care time was spent personally by me on the following activities:     Obtaining a history.  Examination of patient.  Providing medical care at the patients  bedside.  Developing a treatment plan with patient or surrogate and bedside caregivers.  Ordering and reviewing laboratory studies, radiographic studies, pulse oximetry.  Ordering and performing treatments and interventions.  Evaluation of patient's response to treatment.  Discussions with consultants while on the unit and immediately available to the patient.  Re-evaluation of the patient's condition.  Documentation in the medical record.     Paolo Martinez MD    [facility]  [address] [place] [id number]    [phone] - tel  [phone] - fax    6/22/2024

## 2024-06-22 NOTE — PROGRESS NOTES
Pharmacist Renal Dose Adjustment Note    Juhi Taylor is a 73 y.o. female being treated with the medication Enoxaparin.    Patient Data:    Vital Signs (Most Recent):  Temp: 97.4 °F (36.3 °C) (06/22/24 1450)  Pulse: 70 (06/22/24 1700)  Resp: 18 (06/22/24 1450)  BP: (!) 101/58 (06/22/24 1700)  SpO2: 96 % (06/22/24 1450) Vital Signs (72h Range):  Temp:  [96.6 °F (35.9 °C)-98.1 °F (36.7 °C)]   Pulse:  []   Resp:  [17-24]   BP: ()/()   SpO2:  [91 %-100 %]      Recent Labs   Lab 06/20/24  0402 06/21/24  0519 06/22/24  0526   CREATININE 4.3* 4.8* 5.1*     Serum creatinine: 5.1 mg/dL (H) 06/22/24 0526  Estimated creatinine clearance: 15.3 mL/min (A)    Enoxaparin 135 mg subq every 24 hours will be changed to Enoxaparin 150 mg subq every 24 hours due to weight increase.    Pharmacist's Name: Belle Navarro  Pharmacist's Extension: [phone]

## 2024-06-22 NOTE — SUBJECTIVE & OBJECTIVE
Interval History:   Patient is lethargic, received Dilaudid, has a PermCath placement to the left upper chest vascular surgeon.  Afebrile, very poor response to Lasix drip.  Daughter at bedside.  Patient looks severe fluid overload.     Review of Systems   Unable to perform ROS: Mental status change   All other systems reviewed and are negative.    Objective:     Vital Signs (Most Recent):  Temp: 97.4 °F (36.3 °C) (06/22/24 1057)  Pulse: 105 (06/22/24 1057)  Resp: 18 (06/22/24 1110)  BP: 130/84 (06/22/24 1057)  SpO2: 96 % (06/22/24 1057) Vital Signs (24h Range):  Temp:  [96.6 °F (35.9 °C)-98.1 °F (36.7 °C)] 97.4 °F (36.3 °C)  Pulse:  [] 105  Resp:  [18-22] 18  SpO2:  [91 %-100 %] 96 %  BP: (114-132)/(74-85) 130/84     Weight: (!) 147.9 kg (326 lb 1 oz)  Body mass index is 51.07 kg/m².    Intake/Output Summary (Last 24 hours) at 6/22/2024 1533  Last data filed at 6/22/2024 0444  Gross per 24 hour   Intake 240 ml   Output 700 ml   Net -460 ml         Physical Exam  Constitutional:       Appearance: She is ill-appearing.   Cardiovascular:      Pulses: Normal pulses.   Pulmonary:      Breath sounds: Rales present.   Musculoskeletal:         General: Swelling present.   Neurological:      Mental Status: She is oriented to person, place, and time.             Significant Labs: All pertinent labs within the past 24 hours have been reviewed.    Significant Imaging: I have reviewed all pertinent imaging results/findings within the past 24 hours.    X-Ray Chest 1 View    Result Date: 6/22/2024  EXAMINATION: XR CHEST 1 VIEW CLINICAL HISTORY: tunneled catheter;Chronic kidney disease, stage 5 COMPARISON: June 22 12:19 hours FINDINGS: AP view demonstrates stable enlarged cardiac silhouette and unchanged mediastinum.  Central pulmonary vascular congestion and bilateral pulmonary opacities are unchanged. Hemo split catheter is unchanged in position, with distal portions appearing to diverge.  The distal-most portion of the  catheter is again noted to be angulated slightly superiorly and toward midline.     1. Unchanged positioning of left internal jugular hemo split catheter. Electronically signed by: Ricardo Roper Date:    06/22/2024 Time:    13:30    X-Ray Chest AP Portable    Result Date: 6/22/2024  EXAMINATION: XR CHEST AP PORTABLE CLINICAL HISTORY: hd placement; COMPARISON: June 20, 2024 FINDINGS: Enlarged cardiac silhouette is unchanged.  The mediastinum is unremarkable.  Left internal jugular central venous catheter is been placed.  The catheter extends to the level of the superior vena cava, where the distal portion curves superiorly and towards midline.  No pneumothorax is identified. Hazy increased density in the lower right hemithorax consistent with a combination of airspace disease and pleural fluid accumulation is slightly increased.  Faint perihilar infiltrate on the left is stable.     1. Left IJ central catheter placement without pneumothorax.  The catheter tip is abnormally positioned, coursing superiorly and towards midline as above. 2. Slightly increasing airspace disease and small pleural effusion on the right.  Stable mild left perihilar infiltrate. Electronically signed by: Ricardo Roper Date:    06/22/2024 Time:    12:51    Echo    Result Date: 6/20/2024    Left Ventricle: The left ventricle is moderately dilated. Moderately increased wall thickness. There is moderate concentric hypertrophy. Moderate global hypokinesis present. There is mildly reduced systolic function with a visually estimated ejection fraction of 40 - 45%. Unable to assess diastolic function due to atrial fibrillation.   Right Ventricle: Normal right ventricular cavity size. Wall thickness is normal. Systolic function is borderline low.   Left Atrium: Left atrium is mildly dilated.   Right Atrium: Right atrium is mildly dilated.   Mitral Valve: There is mild regurgitation with a centrally directed jet.   Tricuspid Valve: There is mild  regurgitation with a centrally directed jet.   Pulmonary Artery: There is moderate to severe pulmonary hypertension. The estimated pulmonary artery systolic pressure is 56 mmHg.   IVC/SVC: Elevated venous pressure at 15 mmHg.     X-Ray Chest AP Portable    Result Date: 6/20/2024  EXAMINATION: XR CHEST AP PORTABLE CLINICAL HISTORY: fluid overload; COMPARISON: June 18 23 FINDINGS: Cardiomegaly is stable.  The mediastinum is unremarkable.  Prominence of the central pulmonary vasculature is stable.  Small right pleural effusion appears slightly increased, with mild right basilar atelectasis or infiltrate.     1. Small right pleural effusion appears slightly increased.  No other significant changes. Electronically signed by: Ricardo Roper Date:    06/20/2024 Time:    10:40    X-Ray Chest AP    Result Date: 6/18/2024  EXAMINATION: XR CHEST AP PORTABLE CLINICAL HISTORY: Shortness of breath; FINDINGS: Portable chest with comparison chest x-ray 05/31/2024.  Unchanged enlarged cardiomediastinal silhouette.  There is prominence of the central vascular structures.  Hazy ill-defined opacification of the right lung base noted.Lungs are clear. Pulmonary vasculature is normal. No acute osseous abnormality.     1. Unchanged enlarged cardiomediastinal silhouette. 2. Hazy opacification of the right lung base could reflect atelectasis, infiltrate, pleural effusion or combination there of. Electronically signed by: Damian Perez Date:    06/18/2024 Time:    13:20    X-Ray Chest PA And Lateral    Result Date: 5/31/2024  EXAMINATION: XR CHEST PA AND LATERAL CLINICAL HISTORY: Chronic kidney disease, stage 5 TECHNIQUE: PA and lateral views of the chest were performed. COMPARISON: Chest x-ray of March 14, 2024 FINDINGS: There is cardiomegaly in a biventricular pattern.  There is prominence of the pulmonary vasculature.  Pulmonary edema is not presently seen.  No pneumothorax or pleural effusion is noted.     Cardiomegaly and prominence  of the pulmonary vasculature may represent borderline heart failure otherwise negative chest Electronically signed by: Natanael Jenkins MD Date:    05/31/2024 Time:    14:25  - pulls last radiology orders

## 2024-06-22 NOTE — PROGRESS NOTES
[facility] Medicine  Progress Note    Patient Name: Juhi Taylor  MRN: [id number]  Patient Class: IP- Inpatient   Admission Date: 6/18/2024  Length of Stay: 3 days  Attending Physician: Cholo Parekh MD  Primary Care Provider: Tony Sadler MD        Subjective:     Principal Problem:Acute hypoxic respiratory failure        HPI:  73 year old pt getting admitted with fluid overload in need of dialysis  Pt had PD catheter insertion 2 weeks ago  She was supposed to start on PD for ESRD but got delayed due to insurance issues  She has oliguria/increased weight gain in past 2 weeks  Also SOB on exertion and cough  Symptoms got worse and she came to ER and got admitted  Pt was started on lasix gtt iv         Overview/Hospital Course:  Patient is a 73-year-old female who was admitted for acute hypoxic respiratory failure likely multifactorial from CKD and CHF .  Patient had PD cath placed 2 weeks ago however due to insurance situation never received dialysis. EF 40 -45% with elevated BNP. Patient was placed on Furosemide and metolazone with poor urine output eventually leading to dialysis.Nephrology is following. Discontinued antibiotics since infection was ruled out.     Interval History:   Patient is lethargic, received Dilaudid, has a PermCath placement to the left upper chest vascular surgeon.  Afebrile, very poor response to Lasix drip.  Daughter at bedside.  Patient looks severe fluid overload.     Review of Systems   Unable to perform ROS: Mental status change   All other systems reviewed and are negative.    Objective:     Vital Signs (Most Recent):  Temp: 97.4 °F (36.3 °C) (06/22/24 1057)  Pulse: 105 (06/22/24 1057)  Resp: 18 (06/22/24 1110)  BP: 130/84 (06/22/24 1057)  SpO2: 96 % (06/22/24 1057) Vital Signs (24h Range):  Temp:  [96.6 °F (35.9 °C)-98.1 °F (36.7 °C)] 97.4 °F (36.3 °C)  Pulse:  [] 105  Resp:  [18-22] 18  SpO2:  [91 %-100 %] 96 %  BP: (114-132)/(74-85)  130/84     Weight: (!) 147.9 kg (326 lb 1 oz)  Body mass index is 51.07 kg/m².    Intake/Output Summary (Last 24 hours) at 6/22/2024 1533  Last data filed at 6/22/2024 0444  Gross per 24 hour   Intake 240 ml   Output 700 ml   Net -460 ml         Physical Exam  Constitutional:       Appearance: She is ill-appearing.   Cardiovascular:      Pulses: Normal pulses.   Pulmonary:      Breath sounds: Rales present.   Musculoskeletal:         General: Swelling present.   Neurological:      Mental Status: She is oriented to person, place, and time.             Significant Labs: All pertinent labs within the past 24 hours have been reviewed.    Significant Imaging: I have reviewed all pertinent imaging results/findings within the past 24 hours.    X-Ray Chest 1 View    Result Date: 6/22/2024  EXAMINATION: XR CHEST 1 VIEW CLINICAL HISTORY: tunneled catheter;Chronic kidney disease, stage 5 COMPARISON: June 22 12:19 hours FINDINGS: AP view demonstrates stable enlarged cardiac silhouette and unchanged mediastinum.  Central pulmonary vascular congestion and bilateral pulmonary opacities are unchanged. Hemo split catheter is unchanged in position, with distal portions appearing to diverge.  The distal-most portion of the catheter is again noted to be angulated slightly superiorly and toward midline.     1. Unchanged positioning of left internal jugular hemo split catheter. Electronically signed by: Ricardo Roper Date:    06/22/2024 Time:    13:30    X-Ray Chest AP Portable    Result Date: 6/22/2024  EXAMINATION: XR CHEST AP PORTABLE CLINICAL HISTORY: hd placement; COMPARISON: June 20, 2024 FINDINGS: Enlarged cardiac silhouette is unchanged.  The mediastinum is unremarkable.  Left internal jugular central venous catheter is been placed.  The catheter extends to the level of the superior vena cava, where the distal portion curves superiorly and towards midline.  No pneumothorax is identified. Hazy increased density in the lower  right hemithorax consistent with a combination of airspace disease and pleural fluid accumulation is slightly increased.  Faint perihilar infiltrate on the left is stable.     1. Left IJ central catheter placement without pneumothorax.  The catheter tip is abnormally positioned, coursing superiorly and towards midline as above. 2. Slightly increasing airspace disease and small pleural effusion on the right.  Stable mild left perihilar infiltrate. Electronically signed by: Ricardo Roper Date:    06/22/2024 Time:    12:51    Echo    Result Date: 6/20/2024    Left Ventricle: The left ventricle is moderately dilated. Moderately increased wall thickness. There is moderate concentric hypertrophy. Moderate global hypokinesis present. There is mildly reduced systolic function with a visually estimated ejection fraction of 40 - 45%. Unable to assess diastolic function due to atrial fibrillation.   Right Ventricle: Normal right ventricular cavity size. Wall thickness is normal. Systolic function is borderline low.   Left Atrium: Left atrium is mildly dilated.   Right Atrium: Right atrium is mildly dilated.   Mitral Valve: There is mild regurgitation with a centrally directed jet.   Tricuspid Valve: There is mild regurgitation with a centrally directed jet.   Pulmonary Artery: There is moderate to severe pulmonary hypertension. The estimated pulmonary artery systolic pressure is 56 mmHg.   IVC/SVC: Elevated venous pressure at 15 mmHg.     X-Ray Chest AP Portable    Result Date: 6/20/2024  EXAMINATION: XR CHEST AP PORTABLE CLINICAL HISTORY: fluid overload; COMPARISON: June 18 23 FINDINGS: Cardiomegaly is stable.  The mediastinum is unremarkable.  Prominence of the central pulmonary vasculature is stable.  Small right pleural effusion appears slightly increased, with mild right basilar atelectasis or infiltrate.     1. Small right pleural effusion appears slightly increased.  No other significant changes. Electronically  signed by: Ricardo Roper Date:    06/20/2024 Time:    10:40    X-Ray Chest AP    Result Date: 6/18/2024  EXAMINATION: XR CHEST AP PORTABLE CLINICAL HISTORY: Shortness of breath; FINDINGS: Portable chest with comparison chest x-ray 05/31/2024.  Unchanged enlarged cardiomediastinal silhouette.  There is prominence of the central vascular structures.  Hazy ill-defined opacification of the right lung base noted.Lungs are clear. Pulmonary vasculature is normal. No acute osseous abnormality.     1. Unchanged enlarged cardiomediastinal silhouette. 2. Hazy opacification of the right lung base could reflect atelectasis, infiltrate, pleural effusion or combination there of. Electronically signed by: Damian Perez Date:    06/18/2024 Time:    13:20    X-Ray Chest PA And Lateral    Result Date: 5/31/2024  EXAMINATION: XR CHEST PA AND LATERAL CLINICAL HISTORY: Chronic kidney disease, stage 5 TECHNIQUE: PA and lateral views of the chest were performed. COMPARISON: Chest x-ray of March 14, 2024 FINDINGS: There is cardiomegaly in a biventricular pattern.  There is prominence of the pulmonary vasculature.  Pulmonary edema is not presently seen.  No pneumothorax or pleural effusion is noted.     Cardiomegaly and prominence of the pulmonary vasculature may represent borderline heart failure otherwise negative chest Electronically signed by: Natanael Jenkins MD Date:    05/31/2024 Time:    14:25  - pulls last radiology orders      Assessment/Plan:      * Acute hypoxic respiratory failure  Patient has severe fluid overload secondary to ESRD, no need hemodialysis  Likely multifactorial from CHF with worsening CKD  EF 40-45% with elevated BNP  Currently on Furosemide and metolazone with poor urine output,plan for dialysis on 6/22 ,    Very poor response to Lasix drip  Status post tunnel line 6/22  Nephrology is following      CHF (congestive heart failure)  EF 40-45% with elevated BNP  Currently on Furosemide and metolazone with poor  "urine output,plan for dialysis on 6/22 with consult to vascular surgery for tunnel line   Nephrology is following       Morbid obesity  Body mass index is 45.73 kg/m². Morbid obesity complicates all aspects of disease management from diagnostic modalities to treatment.     Chronic kidney disease (CKD), stage V  Patient had PD cath placed 2 weeks ago however due to insurance situation never received dialysis  See under respiratory failure       Renal cell carcinoma of right kidney  Aware       Paroxysmal atrial fibrillation  Chronic stable issue  Continue carvedilol, Holding home apixaban for now      Type 2 diabetes mellitus with hyperglycemia  Patient's FSGs are controlled on current medication regimen.  Last A1c reviewed-   Lab Results   Component Value Date    HGBA1C 5.4 01/22/2024     Most recent fingerstick glucose reviewed- No results for input(s): "POCTGLUCOSE" in the last 24 hours.  Current correctional scale  Medium  Maintain anti-hyperglycemic dose as follows-   Antihyperglycemics (From admission, onward)      Start     Stop Route Frequency Ordered    06/18/24 1619  insulin aspart U-100 pen 0-10 Units         -- SubQ Before meals & nightly PRN 06/18/24 1519          Hold Oral hypoglycemics while patient is in the hospital.    Essential hypertension  Chronic, controlled. Latest blood pressure and vitals reviewed-     Temp:  [98.6 °F (37 °C)]   Pulse:  [104-106]   Resp:  [20-22]   BP: (138-162)/()   SpO2:  [89 %-97 %] .   Home meds for hypertension were reviewed and noted below.   Hypertension Medications               carvediloL (COREG) 25 MG tablet Take 1 tablet (25 mg total) by mouth 2 (two) times daily.    cloNIDine (CATAPRES) 0.1 MG tablet Take 1 tablet (0.1 mg total) by mouth 3 (three) times daily as needed (PRN SBP > 165 mmHg).    cloNIDine 0.1 mg/24 hr td ptwk (CATAPRES) 0.1 mg/24 hr Place 1 patch onto the skin every 7 days.    furosemide (LASIX) 40 MG tablet Take 40 mg by mouth once daily.    " amLODIPine (NORVASC) 10 MG tablet Take 10 mg by mouth once daily.    nitroGLYCERIN (NITROSTAT) 0.4 MG SL tablet Place 1 tablet (0.4 mg total) under the tongue every 5 (five) minutes as needed for Chest pain.            While in the hospital, will manage blood pressure as follows; Continue home antihypertensive regimen    Will utilize p.r.n. blood pressure medication only if patient's blood pressure greater than 140/90 and she develops symptoms such as worsening chest pain or shortness of breath.    Coronary artery disease  Stable  Continue with medical management      VTE Risk Mitigation (From admission, onward)           Ordered     enoxaparin injection 135 mg  Every 24 hours         06/18/24 1832     IP VTE HIGH RISK PATIENT  Once         06/18/24 1519     Place sequential compression device  Until discontinued         06/18/24 1519                    Discharge Planning   HARINI: 6/26/2024     Code Status: Full Code   Is the patient medically ready for discharge?:     Reason for patient still in hospital (select all that apply): Patient trending condition and Treatment  Discharge Plan A: Home with family                  Cholo Parekh MD  Department of Hospital Medicine   [facility]

## 2024-06-22 NOTE — PT/OT/SLP PROGRESS
Physical Therapy      Patient Name:  Juhi Taylor   MRN:  [id number]    Patient not seen today secondary to Other (Comment), Dialysis (Per RN, pt having a hemasplit placed and then having dialysis this pm.). Will follow-up 6/24/24.

## 2024-06-22 NOTE — PROGRESS NOTES
06/22/24 1805   Required for all Hemodialysis Patients   Hepatitis Status negative   Handoff Report   Received From Jennifer Reddy To Navya   Treatment Type   Treatment Type Acute   Vital Signs   Temp 97.4 °F (36.3 °C)   Temp Source Oral   Pulse 77   Heart Rate Source Monitor   Resp 18   SpO2 96 %   Flow (L/min) (Oxygen Therapy) 2   Device (Oxygen Therapy) nasal cannula   BP (!) 106/58   BP Location Right arm   BP Method Automatic   Patient Position Lying   Assessments (Pre/Post)   Consent Obtained yes   Date Hepatitis Profile Obtained 05/31/24   Blood Liters Processed (BLP) 44   Transport Modality bed   Level of Consciousness (AVPU) alert   Dialyzer Clearance mildly streaked   Pain   Preferred Pain Scale number (Numeric Rating Pain Scale)   Pain Rating (0-10): Rest 0   Pain Rating (0-10): Activity 0   Pre-Hemodialysis Assessment   Patient Status Departed        Hemodialysis Catheter 06/22/24 1130 left internal jugular   Placement Date/Time: 06/22/24 1130   Location: left internal jugular  Placement Verification: X-ray   Line Necessity Review CRRT/HD   Site Assessment No drainage;No redness;No swelling;No warmth   Line Securement Device Secured with sutures   Dressing Type Central line dressing;CHG impregnated dressing/sponge   Dressing Status Intact;Old drainage   Dressing Intervention First dressing   Date on Dressing 06/22/24   Dressing Due to be Changed 06/27/24   Venous Patency/Care flushed w/o difficulty;normal saline locked   Arterial Patency/Care flushed w/o difficulty;normal saline locked   Post-Hemodialysis Assessment   Rinseback Volume (mL) 250 mL   Blood Volume Processed (Liters) 44 L   Dialyzer Clearance Lightly streaked   Duration of Treatment 180 minutes   Additional Fluid Intake (mL) 500 mL   Total UF (mL) 1000 mL   Net Fluid Removal 500   Patient Response to Treatment tolerated fair   Post-Treatment Weight 147.4 kg (324 lb 15.3 oz)   Treatment Weight Change -0.5   Post-Hemodialysis Comments tx  completed, pt stable, lines flushed, clamped and capped   Edema   Edema generalized

## 2024-06-22 NOTE — RESPIRATORY THERAPY
06/21/24 2050   Patient Assessment/Suction   Level of Consciousness (AVPU) alert   Respiratory Effort Normal;Unlabored   PRE-TX-O2   Device (Oxygen Therapy) nasal cannula   Flow (L/min) (Oxygen Therapy) 2   SpO2 95 %   Pulse Oximetry Type Intermittent   $ Pulse Oximetry - Multiple Charge Pulse Oximetry - Multiple   Education   $ Education 15 min

## 2024-06-22 NOTE — NURSING
Nurses Note -- 4 Eyes      6/22/2024   3:30 PM      Skin assessed during: Daily Assessment      [x] No Altered Skin Integrity Present    []Prevention Measures Documented      [] Yes- Altered Skin Integrity Present or Discovered   [] LDA Added if Not in Epic (Describe Wound)   [] New Altered Skin Integrity was Present on Admit and Documented in LDA   [] Wound Image Taken    Wound Care Consulted? No    Attending Nurse:  Navya Strickland RN/Staff Member:   michelle

## 2024-06-23 ENCOUNTER — ANESTHESIA EVENT (OUTPATIENT)
Dept: SURGERY | Facility: HOSPITAL | Age: 74
End: 2024-06-23
Payer: MEDICARE

## 2024-06-23 ENCOUNTER — ANESTHESIA (OUTPATIENT)
Dept: SURGERY | Facility: HOSPITAL | Age: 74
End: 2024-06-23
Payer: MEDICARE

## 2024-06-23 LAB
ALBUMIN SERPL BCP-MCNC: 3.1 G/DL (ref 3.5–5.2)
ALP SERPL-CCNC: 85 U/L (ref 55–135)
ALT SERPL W/O P-5'-P-CCNC: 14 U/L (ref 10–44)
ANION GAP SERPL CALC-SCNC: 9 MMOL/L (ref 8–16)
AST SERPL-CCNC: 16 U/L (ref 10–40)
BACTERIA #/AREA URNS HPF: ABNORMAL /HPF
BASOPHILS # BLD AUTO: 0.02 K/UL (ref 0–0.2)
BASOPHILS NFR BLD: 0.2 % (ref 0–1.9)
BILIRUB SERPL-MCNC: 0.7 MG/DL (ref 0.1–1)
BUN SERPL-MCNC: 90 MG/DL (ref 8–23)
CALCIUM SERPL-MCNC: 9.1 MG/DL (ref 8.7–10.5)
CHLORIDE SERPL-SCNC: 106 MMOL/L (ref 95–110)
CO2 SERPL-SCNC: 22 MMOL/L (ref 23–29)
CREAT SERPL-MCNC: 4.6 MG/DL (ref 0.5–1.4)
DIFFERENTIAL METHOD BLD: ABNORMAL
EOSINOPHIL # BLD AUTO: 0.2 K/UL (ref 0–0.5)
EOSINOPHIL NFR BLD: 1.7 % (ref 0–8)
ERYTHROCYTE [DISTWIDTH] IN BLOOD BY AUTOMATED COUNT: 19.2 % (ref 11.5–14.5)
EST. GFR  (NO RACE VARIABLE): 9.6 ML/MIN/1.73 M^2
GLUCOSE SERPL-MCNC: 112 MG/DL (ref 70–110)
GLUCOSE SERPL-MCNC: 117 MG/DL (ref 70–110)
GLUCOSE SERPL-MCNC: 124 MG/DL (ref 70–110)
GLUCOSE SERPL-MCNC: 135 MG/DL (ref 70–110)
HCT VFR BLD AUTO: 43.2 % (ref 37–48.5)
HGB BLD-MCNC: 12.1 G/DL (ref 12–16)
IMM GRANULOCYTES # BLD AUTO: 0.03 K/UL (ref 0–0.04)
IMM GRANULOCYTES NFR BLD AUTO: 0.3 % (ref 0–0.5)
LYMPHOCYTES # BLD AUTO: 1.4 K/UL (ref 1–4.8)
LYMPHOCYTES NFR BLD: 16.6 % (ref 18–48)
MAGNESIUM SERPL-MCNC: 2.4 MG/DL (ref 1.6–2.6)
MCH RBC QN AUTO: 25.4 PG (ref 27–31)
MCHC RBC AUTO-ENTMCNC: 28 G/DL (ref 32–36)
MCV RBC AUTO: 91 FL (ref 82–98)
MICROSCOPIC COMMENT: ABNORMAL
MONOCYTES # BLD AUTO: 0.7 K/UL (ref 0.3–1)
MONOCYTES NFR BLD: 8.4 % (ref 4–15)
NEUTROPHILS # BLD AUTO: 6.3 K/UL (ref 1.8–7.7)
NEUTROPHILS NFR BLD: 72.8 % (ref 38–73)
NRBC BLD-RTO: 1 /100 WBC
PLATELET # BLD AUTO: 193 K/UL (ref 150–450)
PMV BLD AUTO: 10.3 FL (ref 9.2–12.9)
POTASSIUM SERPL-SCNC: 5.3 MMOL/L (ref 3.5–5.1)
PROT SERPL-MCNC: 5.8 G/DL (ref 6–8.4)
RBC # BLD AUTO: 4.76 M/UL (ref 4–5.4)
RBC #/AREA URNS HPF: >100 /HPF (ref 0–4)
SODIUM SERPL-SCNC: 137 MMOL/L (ref 136–145)
SQUAMOUS #/AREA URNS HPF: 22 /HPF
WBC # BLD AUTO: 8.61 K/UL (ref 3.9–12.7)
WBC #/AREA URNS HPF: >100 /HPF (ref 0–5)
WBC CLUMPS URNS QL MICRO: ABNORMAL

## 2024-06-23 PROCEDURE — 94761 N-INVAS EAR/PLS OXIMETRY MLT: CPT

## 2024-06-23 PROCEDURE — C1769 GUIDE WIRE: HCPCS | Performed by: STUDENT IN AN ORGANIZED HEALTH CARE EDUCATION/TRAINING PROGRAM

## 2024-06-23 PROCEDURE — 25000003 PHARM REV CODE 250: Performed by: FAMILY MEDICINE

## 2024-06-23 PROCEDURE — 99900035 HC TECH TIME PER 15 MIN (STAT)

## 2024-06-23 PROCEDURE — 25000003 PHARM REV CODE 250: Performed by: INTERNAL MEDICINE

## 2024-06-23 PROCEDURE — 63600150 PHARM REV CODE 636: Performed by: INTERNAL MEDICINE

## 2024-06-23 PROCEDURE — 94799 UNLISTED PULMONARY SVC/PX: CPT

## 2024-06-23 PROCEDURE — 36581 REPLACE TUNNELED CV CATH: CPT | Mod: 58,LT,ICN, | Performed by: STUDENT IN AN ORGANIZED HEALTH CARE EDUCATION/TRAINING PROGRAM

## 2024-06-23 PROCEDURE — 63600175 PHARM REV CODE 636 W HCPCS: Performed by: STUDENT IN AN ORGANIZED HEALTH CARE EDUCATION/TRAINING PROGRAM

## 2024-06-23 PROCEDURE — 63600175 PHARM REV CODE 636 W HCPCS: Performed by: NURSE ANESTHETIST, CERTIFIED REGISTERED

## 2024-06-23 PROCEDURE — 80053 COMPREHEN METABOLIC PANEL: CPT | Performed by: INTERNAL MEDICINE

## 2024-06-23 PROCEDURE — 36000704 HC OR TIME LEV I 1ST 15 MIN: Performed by: STUDENT IN AN ORGANIZED HEALTH CARE EDUCATION/TRAINING PROGRAM

## 2024-06-23 PROCEDURE — 37000008 HC ANESTHESIA 1ST 15 MINUTES: Performed by: STUDENT IN AN ORGANIZED HEALTH CARE EDUCATION/TRAINING PROGRAM

## 2024-06-23 PROCEDURE — 36000705 HC OR TIME LEV I EA ADD 15 MIN: Performed by: STUDENT IN AN ORGANIZED HEALTH CARE EDUCATION/TRAINING PROGRAM

## 2024-06-23 PROCEDURE — 71000033 HC RECOVERY, INTIAL HOUR: Performed by: STUDENT IN AN ORGANIZED HEALTH CARE EDUCATION/TRAINING PROGRAM

## 2024-06-23 PROCEDURE — 85025 COMPLETE CBC W/AUTO DIFF WBC: CPT | Performed by: INTERNAL MEDICINE

## 2024-06-23 PROCEDURE — 83735 ASSAY OF MAGNESIUM: CPT | Performed by: INTERNAL MEDICINE

## 2024-06-23 PROCEDURE — 71000039 HC RECOVERY, EACH ADD'L HOUR: Performed by: STUDENT IN AN ORGANIZED HEALTH CARE EDUCATION/TRAINING PROGRAM

## 2024-06-23 PROCEDURE — 27000221 HC OXYGEN, UP TO 24 HOURS

## 2024-06-23 PROCEDURE — 21400001 HC TELEMETRY ROOM

## 2024-06-23 PROCEDURE — 25000003 PHARM REV CODE 250: Performed by: NURSE ANESTHETIST, CERTIFIED REGISTERED

## 2024-06-23 PROCEDURE — 81001 URINALYSIS AUTO W/SCOPE: CPT | Performed by: HOSPITALIST

## 2024-06-23 PROCEDURE — 25000003 PHARM REV CODE 250: Performed by: STUDENT IN AN ORGANIZED HEALTH CARE EDUCATION/TRAINING PROGRAM

## 2024-06-23 PROCEDURE — 36415 COLL VENOUS BLD VENIPUNCTURE: CPT | Performed by: INTERNAL MEDICINE

## 2024-06-23 PROCEDURE — 82962 GLUCOSE BLOOD TEST: CPT | Performed by: STUDENT IN AN ORGANIZED HEALTH CARE EDUCATION/TRAINING PROGRAM

## 2024-06-23 PROCEDURE — 0J2SXYZ CHANGE OTHER DEVICE IN HEAD AND NECK SUBCUTANEOUS TISSUE AND FASCIA, EXTERNAL APPROACH: ICD-10-PCS | Performed by: STUDENT IN AN ORGANIZED HEALTH CARE EDUCATION/TRAINING PROGRAM

## 2024-06-23 PROCEDURE — 37000009 HC ANESTHESIA EA ADD 15 MINS: Performed by: STUDENT IN AN ORGANIZED HEALTH CARE EDUCATION/TRAINING PROGRAM

## 2024-06-23 RX ORDER — PROPOFOL 10 MG/ML
VIAL (ML) INTRAVENOUS
Status: DISCONTINUED | OUTPATIENT
Start: 2024-06-23 | End: 2024-06-23

## 2024-06-23 RX ORDER — ONDANSETRON HYDROCHLORIDE 2 MG/ML
4 INJECTION, SOLUTION INTRAVENOUS DAILY PRN
Status: DISCONTINUED | OUTPATIENT
Start: 2024-06-23 | End: 2024-06-27

## 2024-06-23 RX ORDER — HEPARIN SODIUM 1000 [USP'U]/ML
INJECTION, SOLUTION INTRAVENOUS; SUBCUTANEOUS
Status: DISCONTINUED | OUTPATIENT
Start: 2024-06-23 | End: 2024-06-23 | Stop reason: HOSPADM

## 2024-06-23 RX ORDER — GUAIFENESIN AND DEXTROMETHORPHAN HYDROBROMIDE 10; 100 MG/5ML; MG/5ML
5 SYRUP ORAL EVERY 4 HOURS PRN
Status: DISCONTINUED | OUTPATIENT
Start: 2024-06-23 | End: 2024-06-23

## 2024-06-23 RX ORDER — LIDOCAINE HYDROCHLORIDE 10 MG/ML
INJECTION INFILTRATION; PERINEURAL
Status: DISCONTINUED | OUTPATIENT
Start: 2024-06-23 | End: 2024-06-23 | Stop reason: HOSPADM

## 2024-06-23 RX ORDER — BENZONATATE 100 MG/1
100 CAPSULE ORAL 3 TIMES DAILY PRN
Status: DISCONTINUED | OUTPATIENT
Start: 2024-06-23 | End: 2024-06-25

## 2024-06-23 RX ORDER — DIPHENHYDRAMINE HYDROCHLORIDE 50 MG/ML
6.25 INJECTION INTRAMUSCULAR; INTRAVENOUS EVERY 6 HOURS PRN
Status: DISCONTINUED | OUTPATIENT
Start: 2024-06-23 | End: 2024-06-23

## 2024-06-23 RX ORDER — KETAMINE HCL IN 0.9 % NACL 50 MG/5 ML
SYRINGE (ML) INTRAVENOUS
Status: DISCONTINUED | OUTPATIENT
Start: 2024-06-23 | End: 2024-06-23

## 2024-06-23 RX ADMIN — FUROSEMIDE 15 MG/HR: 10 INJECTION, SOLUTION INTRAMUSCULAR; INTRAVENOUS at 01:06

## 2024-06-23 RX ADMIN — MUPIROCIN 1 G: 20 OINTMENT TOPICAL at 09:06

## 2024-06-23 RX ADMIN — SODIUM CHLORIDE: 9 INJECTION, SOLUTION INTRAVENOUS at 09:06

## 2024-06-23 RX ADMIN — PROPOFOL 30 MG: 10 INJECTION, EMULSION INTRAVENOUS at 10:06

## 2024-06-23 RX ADMIN — Medication 20 MG: at 10:06

## 2024-06-23 RX ADMIN — DEXTROSE 2 G: 50 INJECTION, SOLUTION INTRAVENOUS at 10:06

## 2024-06-23 NOTE — PROGRESS NOTES
Nephrology Consult Note        Patient Name: Juhi Taylor  MRN: [id number]    Patient Class: IP- Inpatient   Admission Date: 6/18/2024  Length of Stay: 4 days  Date of Service: 6/23/2024    Attending Physician: Cholo Parekh MD  Primary Care Provider: Tony Sadler MD    Reason for Consult: anasasimran    SUBJECTIVE:     HPI: 73F fallowed with Dr. Martinez from nephrology for advanced CKD stage 4-5 getting admitted with fluid overload. Pt had PD catheter insertion 2 weeks ago in preparation for initiation of dialysis, however had post-operative issues with increased leakage. Then her PD in initiation and training was delayed due to insurance issues. She has oliguria/increased weight gain in past 2 weeks, SOB on exertion and cough. Symptoms got worse and she came to ER and got admitted.     I saw her in the ER and recommend trial of lasix gtt, cassia. Will consult Dr. Catalan to evaluate patient and make recommendation for catheter care - we probably will not be able to use it for now for dialysis.    6/19 VSS. Only 175cc UO documented, but may be inaccurately charted - pt boarding in ER. Will increase lasix gtt to 10mg/hr and ensure at least q shift UO documentation.    6/20 VSS. BP low, stop Clonidine patch. Increase lasix gtt to 15mg/hr, add metolazone. Stop Fluconazole in 1-2 days after stopping IV abx for suspected PNA. Appreciate Surgery input - will d/w patient initiation of HD tomorrow if unable to produce much UO and symptomatic improvement...    6/21 VSS. Will discuss initiation of dialysis via HD, she is not responding to diuretics.    6/22 VSS. Plan for HD today after dialysis access placement. Seen on HD, BF around 275 cc/min max, otherwise tolerating well. Will have to be repositioned in cath lab by Dr. Griffin on Monday.    6/23 VSS. Getting HD cath repositioned today, plan HD tomorrow.    ASSESSMENT/PLAN:     CKD stage 5  Volume overload  CHF exacerbation  Uremia  PD cathether in  "place  No NSAIDs, ACEI/ARB, IV contrast or other nephrotoxins.  Keep MAP > 60, SBP > 100.  Dose meds for GFR < 30 ml/min.  Renal diet - low K, low phos.  Lasix gtt, antony, if not working consider hemodialysis or PD.    Not responding to diuretics, patient agrees on initiation of HD today. Had line placed by Dr. Griffin and needs outpatient arrangements.    Anemia of CKD  Hgb and HCT are acceptable. Monitor for now.  Will provide PONCE and/or IV iron as needed to keep Hgb 8-10 range.    MBD / Secondary HPT  Ca, Phos, PTH and vitamin D levels are acceptable.   Phos binders, vitamin D and analogues, calcimimetics will be given as needed.    HTN  Tolerate asymptomatic HTN up to -160.  Continue home meds - stop Clonidine patch, keep PRN + Coreg.  Low sodium diet as tolerated.    Thank you for allowing us to participate in the care of your patient!   We will follow the patient and provide recommendations as needed.         Laboratory:  Recent Labs   Lab 06/21/24  0519 06/22/24  0526 06/23/24  0442    138 137   K 5.0 4.4 5.3*    102 106   CO2 22* 27 22*   * 119* 90*   CREATININE 4.8* 5.1* 4.6*   * 131* 124*       Recent Labs   Lab 06/20/24  0402 06/21/24  0519 06/22/24  0526 06/23/24  0442   CALCIUM 9.6 9.1 9.3 9.1   ALBUMIN 3.2* 3.2* 3.2* 3.1*   MG 2.5  --  2.7* 2.4       Recent Labs   Lab 10/10/23  1113 01/22/24  1133 05/08/24  1139   PTH, Intact 583.1 H 506.7 H 291.7 H       No results for input(s): "POCTGLUCOSE" in the last 168 hours.    Recent Labs   Lab 07/05/23  1533 01/22/24  1133 06/19/24  0443   Hemoglobin A1C 5.6 5.4 6.8 H       Recent Labs   Lab 06/21/24  1030 06/22/24  0526 06/23/24  0442   WBC 9.41 9.37 8.61   HGB 11.9* 11.9* 12.1   HCT 41.3 41.3 43.2    222 193   MCV 87 88 91   MCHC 28.8* 28.8* 28.0*   MONO 6.5  0.6 7.9  0.7 8.4  0.7   EOSINOPHIL 1.2 1.9 1.7       Recent Labs   Lab 06/21/24  0519 06/22/24  0526 06/23/24  0442   BILITOT 0.5 0.6 0.7   PROT 6.1 6.1 5.8* "   ALBUMIN 3.2* 3.2* 3.1*   ALKPHOS 84 84 85   ALT 10 13 14   AST 15 11 16       Recent Labs   Lab 03/17/22  2240 04/07/22  1536 04/17/23  1448 07/14/23 2026 01/22/24  1129 03/14/24  2254 05/09/24  1029   Color, UA Colorless A   < > Colorless A  --   --  Yellow Yellow   Appearance, UA Clear   < > Clear  --   --  Clear Clear   pH, UA 5.0   < > 6.0  --   --  6.0 6.0   Specific [place], UA 1.010   < > 1.010  --   --  1.010 1.015   Protein, UA Negative   < > 1+ A  --   --  1+ A 1+ A   Glucose, UA Negative   < > Negative  --   --  Negative Negative   Ketones, UA Negative   < > Negative  --   --  Negative Negative   Urobilinogen, UA Negative  --   --   --   --  Negative  --    Bilirubin (UA) Negative   < > Negative  --   --  Negative Negative   Occult Blood UA Negative   < > Negative  --   --  TRACE Negative   Nitrite, UA Negative   < > Negative  --   --  Negative Negative   RBC, UA  --    < > 3   < > 1 3 1   WBC, UA  --    < > 3   < > 1 5 1   Bacteria  --    < > None  --  Occasional None Rare   Hyaline Casts, UA  --    < > 0  --   --  0 0    < > = values in this interval not displayed.             Microbiology Results (last 7 days)       ** No results found for the last 168 hours. **            Review of patient's allergies indicates:   Allergen Reactions    Percocet [oxycodone-acetaminophen] Itching    Amiodarone analogues      Itching      Irbesartan Swelling    Januvia [sitagliptin]     Jardiance [empagliflozin]      Leg cramps    Lipitor [atorvastatin] Other (See Comments)     Severe leg pain    Linaclotide Other (See Comments) and Nausea And Vomiting     Does not remember    Lubiprostone Other (See Comments) and Palpitations     Does not remember       Outpatient meds:  No current facility-administered medications on file prior to encounter.     Current Outpatient Medications on File Prior to Encounter   Medication Sig Dispense Refill    allopurinoL (ZYLOPRIM) 300 MG tablet Take 1 tablet (300 mg total) by mouth once  daily. 90 tablet 3    amLODIPine (NORVASC) 10 MG tablet Take 10 mg by mouth once daily.      calcitRIOL (ROCALTROL) 0.5 MCG Cap Take 0.5 mcg by mouth once daily.      carvediloL (COREG) 25 MG tablet Take 1 tablet (25 mg total) by mouth 2 (two) times daily. 180 tablet 2    cyanocobalamin (VITAMIN B-12) 100 MCG tablet Take 100 mcg by mouth once daily.      evolocumab (REPATHA SURECLICK) 140 mg/mL PnIj Inject 1 mL (140 mg total) into the skin every 14 (fourteen) days. 2 mL 11    furosemide (LASIX) 40 MG tablet Take 40 mg by mouth once daily.      loratadine (CLARITIN) 10 mg tablet Take 10 mg by mouth once daily.      magnesium oxide (MAG-OX) 400 mg (241.3 mg magnesium) tablet Take 1 tablet (400 mg total) by mouth daily as needed (cramping). 30 tablet 0    blood sugar diagnostic (TRUE METRIX GLUCOSE TEST STRIP) Strp Use 1x daily. Insurance preferred. 100 strip 3    blood sugar diagnostic Strp To check BG 1 time daily, to use with insurance preferred meter 100 strip 3    cloNIDine (CATAPRES) 0.1 MG tablet Take 1 tablet (0.1 mg total) by mouth 3 (three) times daily as needed (PRN SBP > 165 mmHg). 90 tablet 6    cloNIDine 0.1 mg/24 hr td ptwk (CATAPRES) 0.1 mg/24 hr Place 1 patch onto the skin every 7 days. 4 patch 4    lancets Misc To check BG 1 times daily, to use with insurance preferred meter 100 each 3    nitroGLYCERIN (NITROSTAT) 0.4 MG SL tablet Place 1 tablet (0.4 mg total) under the tongue every 5 (five) minutes as needed for Chest pain. 25 tablet 0    [DISCONTINUED] aspirin (ECOTRIN) 81 MG EC tablet Take 1 tablet (81 mg total) by mouth once daily. 90 tablet 3    [DISCONTINUED] DULoxetine (CYMBALTA) 20 MG capsule TAKE ONE CAPSULE BY MOUTH ONCE DAILY 30 capsule 4    [DISCONTINUED] folic acid (FOLVITE) 1 MG tablet Take 1 mg by mouth once daily.      [DISCONTINUED] metFORMIN (GLUCOPHAGE-XR) 500 MG ER 24hr tablet Take 2 tablets (1,000 mg total) by mouth 2 (two) times daily with meals. 360 tablet 3    [DISCONTINUED]  sodium bicarbonate 650 MG tablet Take 1 tablet (650 mg total) by mouth once daily. 30 tablet 11       Scheduled meds:   carvediloL  25 mg Oral BID    enoxparin  1 mg/kg Subcutaneous Q24H (treatment, non-standard time)    famotidine  20 mg Oral Daily    guaiFENesin 100 mg/5 ml  200 mg Oral Q12H    metOLazone  5 mg Oral Daily    mupirocin   Nasal BID    senna-docusate 8.6-50 mg  1 tablet Oral Daily       Infusions:   furosemide (LASIX) 2 mg/mL continuous infusion (non-titrating)  15 mg/hr Intravenous Continuous 7.5 mL/hr at 06/23/24 0153 15 mg/hr at 06/23/24 0153       PRN meds:    Current Facility-Administered Medications:     acetaminophen, 650 mg, Oral, Q8H PRN    aluminum-magnesium hydroxide-simethicone, 30 mL, Oral, QID PRN    cloNIDine, 0.1 mg, Oral, Q6H PRN    dextrose 50%, 12.5 g, Intravenous, PRN    dextrose 50%, 25 g, Intravenous, PRN    glucagon (human recombinant), 1 mg, Intramuscular, PRN    glucose, 16 g, Oral, PRN    glucose, 24 g, Oral, PRN    hydrALAZINE, 10 mg, Intravenous, Q6H PRN    HYDROcodone-acetaminophen, 1 tablet, Oral, Q6H PRN    insulin aspart U-100, 0-10 Units, Subcutaneous, QID (AC + HS) PRN    melatonin, 6 mg, Oral, Nightly PRN    ondansetron, 4 mg, Intravenous, Q6H PRN    Past Medical History:   Diagnosis Date    Anticoagulant long-term use     Arthritis     Breast cancer 2014    invasive lobular carcinoma    Cancer of kidney 11/2020    RIGHT KIDNEY CANCER    CHF (congestive heart failure)     Coronary artery disease dx 2005    Depression     Diabetes mellitus     Diastolic heart failure secondary to hypertension     Gout     Hyperlipemia     Hypertension     Hypertrophy of nasal turbinates     Kidney mass 2020    Right    Levoscoliosis     Lung nodule     left    Multiple thyroid nodules     NICOLE (obstructive sleep apnea)     uses C-PAP    Pulmonary hypertension      Past Surgical History:   Procedure Laterality Date    AORTOGRAPHY N/A 12/04/2020    Procedure: Aortogram;  Surgeon: Paul  "THERESE Pedersen MD;  Location: [facility] CATH;  Service: Cardiology;  Laterality: N/A;    BREAST SURGERY      CARDIAC CATHETERIZATION  12/2020    CHOLECYSTECTOMY      COLONOSCOPY      multi -last 2014     CORONARY ARTERY BYPASS GRAFT      ESOPHAGOGASTRODUODENOSCOPY      2012     HAND SURGERY Right     INSERTION, CATHETER, DIALYSIS, PERITONEAL N/A 6/4/2024    Procedure: IN Insertion Catheter, Dialysis, Peritoneal - laparoscopic;  Surgeon: Rodriguez Catalan Jr., MD;  Location: [facility] OR;  Service: General;  Laterality: N/A;    LAPAROSCOPIC ROBOT-ASSISTED SURGICAL REMOVAL OF KIDNEY USING DA CHELLE XI Right 03/10/2022    Procedure: XI ROBOTIC NEPHRECTOMY- radical;  Surgeon: Rolando Ramirez MD;  Location: [facility] OR;  Service: Urology;  Laterality: Right;    MASTECTOMY W/ SENTINEL NODE BIOPSY Bilateral 01/21/2015    bilateral "dog ears"    NASAL SINUS SURGERY  2015    Dr Bryant FESS/cauterization turbinate     PARTIAL HYSTERECTOMY  1989    PERCUTANEOUS TRANSLUMINAL BALLOON ANGIOPLASTY OF CORONARY ARTERY  12/04/2020    Procedure: Angioplasty-coronary;  Surgeon: Paul Pedersen MD;  Location: [facility] CATH;  Service: Cardiology;;    RENAL BIOPSY Right     9/20/2021 EJ    TUBAL LIGATION      ULTRASOUND GUIDANCE  12/04/2020    Procedure: Ultrasound Guidance;  Surgeon: Paul Pedersen MD;  [facility];  Service: Cardiology;;     Family History   Problem Relation Name Age of Onset    Breast cancer Mother      Stroke Father Waqar Sr.     Hypertension Father Waqar Sr.     Hepatitis Brother      Asthma Daughter Nell Taylor     Birth defects Daughter Nelliris Taylor         Nell's two children has cleft lips    Depression Daughter Nell Taylor     Drug abuse Daughter Nell Taylor     Learning disabilities Daughter Nell Taylor     Mental illness Daughter Nell Taylor     Breast cancer Maternal Aunt      Glaucoma Sister      Drug abuse Daughter Nell     Macular degeneration Neg Hx      Retinal detachment Neg Hx   "     Social History     Tobacco Use    Smoking status: Former     Current packs/day: 0.00     Types: Cigarettes     Start date: 2016     Quit date: 2016     Years since quittin.4    Smokeless tobacco: Never    Tobacco comments:     quit    Substance Use Topics    Alcohol use: No    Drug use: No       OBJECTIVE:     Vital Signs and IO:  Temp:  [97 °F (36.1 °C)-98.2 °F (36.8 °C)]   Pulse:  []   Resp:  [17-20]   BP: ()/()   SpO2:  [90 %-96 %]   I/O last 3 completed shifts:  In: 920 [P.O.:420; Other:500]  Out: 1500 [Urine:500; Other:1000]  Wt Readings from Last 5 Encounters:   24 (!) 147.9 kg (326 lb 1 oz)   24 132.5 kg (292 lb 1.8 oz)   24 134.3 kg (296 lb)   24 134.3 kg (296 lb)   24 131.7 kg (290 lb 4.8 oz)     Body mass index is 51.07 kg/m².    Physical Exam  Constitutional:       General: Patient is not in acute distress.     Appearance: Patient is well-developed. She is not diaphoretic.   HENT:      Head: Normocephalic and atraumatic.      Mouth/Throat: Mucous membranes are moist.   Eyes:      General: No scleral icterus.     Pupils: Pupils are equal, round, and reactive to light.   Cardiovascular:      Rate and Rhythm: Normal rate and regular rhythm.   Pulmonary:      Effort: Pulmonary effort is normal. No respiratory distress.      Breath sounds: No stridor.   Abdominal:      General: There is no distension.      Palpations: Abdomen is soft.   Musculoskeletal:         General: No deformity. Normal range of motion.      Cervical back: Neck supple.   Skin:     General: Skin is warm and dry.      Findings: No rash present. No erythema.   Neurological:      Mental Status: Patient is alert and oriented to person, place, and time.      Cranial Nerves: No cranial nerve deficit.   Psychiatric:         Behavior: Behavior normal.          Patient care time was spent personally by me on the following activities:     Obtaining a history.  Examination of  patient.  Providing medical care at the patients bedside.  Developing a treatment plan with patient or surrogate and bedside caregivers.  Ordering and reviewing laboratory studies, radiographic studies, pulse oximetry.  Ordering and performing treatments and interventions.  Evaluation of patient's response to treatment.  Discussions with consultants while on the unit and immediately available to the patient.  Re-evaluation of the patient's condition.  Documentation in the medical record.     Paolo Martinez MD    [facility]  [address] [place] [id number]    [phone] - tel  [phone] - fax    6/23/2024

## 2024-06-23 NOTE — TRANSFER OF CARE
"Anesthesia Transfer of Care Note    Patient: Juhi Taylor    Procedure(s) Performed: Procedure(s) (LRB):  Insertion,catheter,tunneled (N/A)    Patient location: PACU    Anesthesia Type: general    Transport from OR: Transported from OR on 6-10 L/min O2 by face mask with adequate spontaneous ventilation    Post pain: adequate analgesia    Post assessment: no apparent anesthetic complications and tolerated procedure well    Post vital signs: stable    Level of consciousness: awake    Nausea/Vomiting: no nausea/vomiting    Complications: none    Transfer of care protocol was followed      Last vitals: Visit Vitals  /86 (BP Location: Right arm, Patient Position: Lying)   Pulse 100   Temp 36.2 °C (97.2 °F) (Temporal)   Resp 17   Ht 5' 7" (1.702 m)   Wt (!) 147.9 kg (326 lb 1 oz)   SpO2 (!) 94%   BMI 51.07 kg/m²     "

## 2024-06-23 NOTE — OP NOTE
formerly [facility]  Vascular Surgery  Procedure Note    SUMMARY       Procedure:   Left  IJ tunneled dialysis catheter placement      Assisting Surgeon: None    Pre-Operative Diagnosis:  ESRD    Post-Operative Diagnosis:   Same    Anesthesia:   Local - 1% Lidocaine      Description of Technical Procedures:   Consent was obtained prior to performing this procedure.      Patient's left neck was prepped and draped in sterile fashion.   The tunneled tract was infused with lidocaine.  The incision of the neck was reopened.  The catheter was pulled out.  Wire was then placed down into the internal jugular and eventually into the right atrium.  The catheter was then tracked over the wire the catheter was then placed in the appropriate spot.  The catheter was flushed aspirated which it did well.  The incision of the base of the neck was closed with multiple Monocryl sutures.    Estimated Blood Loss (EBL): minimal    Specimens: none           Condition: Good      MD Gaby  Vascular Surgery

## 2024-06-23 NOTE — PROGRESS NOTES
[facility]  Progress Note    Patient Name: Juhi Taylor  MRN: [id number]  Patient Class: IP- Inpatient   Admission Date: 6/18/2024  Length of Stay: 4 days  Attending Physician: Cholo Parekh MD  Primary Care Provider: Tony Sadler MD        Subjective:     Principal Problem:Chronic kidney disease (CKD), stage V        HPI:  73 year old pt getting admitted with fluid overload in need of dialysis  Pt had PD catheter insertion 2 weeks ago  She was supposed to start on PD for ESRD but got delayed due to insurance issues  She has oliguria/increased weight gain in past 2 weeks  Also SOB on exertion and cough  Symptoms got worse and she came to ER and got admitted  Pt was started on lasix gtt iv         Overview/Hospital Course:  Patient is a 73-year-old female who was admitted for acute hypoxic respiratory failure likely multifactorial from CKD and CHF .  Patient had PD cath placed 2 weeks ago however due to insurance situation never received dialysis. EF 40 -45% with elevated BNP. Patient was placed on Furosemide and metolazone with poor urine output eventually leading to dialysis.Nephrology is following. Discontinued antibiotics since infection was ruled out.     Interval History:   Seen in the multidisciplinary rounds, more awake, status post revision of the dialysis catheter this morning, persistent oozing at dialysis catheter site.  Patient has persistent hypotension during dialysis that only take out 0.5 L yesterday.  Still fluid overload.    Review of Systems   Unable to perform ROS: Mental status change   All other systems reviewed and are negative.    Objective:     Vital Signs (Most Recent):  Temp: 97.4 °F (36.3 °C) (06/23/24 1100)  Pulse: (!) 112 (06/23/24 1130)  Resp: 20 (06/23/24 1130)  BP: 108/73 (06/23/24 1130)  SpO2: 100 % (06/23/24 1130) Vital Signs (24h Range):  Temp:  [97 °F (36.1 °C)-98.2 °F (36.8 °C)] 97.4 °F (36.3 °C)  Pulse:  [] 112  Resp:   [17-24] 20  SpO2:  [90 %-100 %] 100 %  BP: ()/() 108/73     Weight: (!) 147.9 kg (326 lb 1 oz)  Body mass index is 51.07 kg/m².    Intake/Output Summary (Last 24 hours) at 6/23/2024 1325  Last data filed at 6/23/2024 1127  Gross per 24 hour   Intake 525 ml   Output 1175 ml   Net -650 ml         Physical Exam  Constitutional:       Appearance: She is ill-appearing.   Cardiovascular:      Pulses: Normal pulses.   Pulmonary:      Breath sounds: Rales present.   Musculoskeletal:         General: Swelling present.   Neurological:      Mental Status: She is oriented to person, place, and time.             Significant Labs: All pertinent labs within the past 24 hours have been reviewed.    Significant Imaging: I have reviewed all pertinent imaging results/findings within the past 24 hours.    X-Ray Chest 1 View    Result Date: 6/22/2024  EXAMINATION: XR CHEST 1 VIEW CLINICAL HISTORY: tunneled catheter;Chronic kidney disease, stage 5 COMPARISON: June 22 12:19 hours FINDINGS: AP view demonstrates stable enlarged cardiac silhouette and unchanged mediastinum.  Central pulmonary vascular congestion and bilateral pulmonary opacities are unchanged. Hemo split catheter is unchanged in position, with distal portions appearing to diverge.  The distal-most portion of the catheter is again noted to be angulated slightly superiorly and toward midline.     1. Unchanged positioning of left internal jugular hemo split catheter. Electronically signed by: Ricardo Roper Date:    06/22/2024 Time:    13:30    X-Ray Chest AP Portable    Result Date: 6/22/2024  EXAMINATION: XR CHEST AP PORTABLE CLINICAL HISTORY: hd placement; COMPARISON: June 20, 2024 FINDINGS: Enlarged cardiac silhouette is unchanged.  The mediastinum is unremarkable.  Left internal jugular central venous catheter is been placed.  The catheter extends to the level of the superior vena cava, where the distal portion curves superiorly and towards midline.  No  pneumothorax is identified. Hazy increased density in the lower right hemithorax consistent with a combination of airspace disease and pleural fluid accumulation is slightly increased.  Faint perihilar infiltrate on the left is stable.     1. Left IJ central catheter placement without pneumothorax.  The catheter tip is abnormally positioned, coursing superiorly and towards midline as above. 2. Slightly increasing airspace disease and small pleural effusion on the right.  Stable mild left perihilar infiltrate. Electronically signed by: Ricardo Roper Date:    06/22/2024 Time:    12:51    Echo    Result Date: 6/20/2024    Left Ventricle: The left ventricle is moderately dilated. Moderately increased wall thickness. There is moderate concentric hypertrophy. Moderate global hypokinesis present. There is mildly reduced systolic function with a visually estimated ejection fraction of 40 - 45%. Unable to assess diastolic function due to atrial fibrillation.   Right Ventricle: Normal right ventricular cavity size. Wall thickness is normal. Systolic function is borderline low.   Left Atrium: Left atrium is mildly dilated.   Right Atrium: Right atrium is mildly dilated.   Mitral Valve: There is mild regurgitation with a centrally directed jet.   Tricuspid Valve: There is mild regurgitation with a centrally directed jet.   Pulmonary Artery: There is moderate to severe pulmonary hypertension. The estimated pulmonary artery systolic pressure is 56 mmHg.   IVC/SVC: Elevated venous pressure at 15 mmHg.     X-Ray Chest AP Portable    Result Date: 6/20/2024  EXAMINATION: XR CHEST AP PORTABLE CLINICAL HISTORY: fluid overload; COMPARISON: June 18 23 FINDINGS: Cardiomegaly is stable.  The mediastinum is unremarkable.  Prominence of the central pulmonary vasculature is stable.  Small right pleural effusion appears slightly increased, with mild right basilar atelectasis or infiltrate.     1. Small right pleural effusion appears  slightly increased.  No other significant changes. Electronically signed by: Ricardo Roper Date:    06/20/2024 Time:    10:40    X-Ray Chest AP    Result Date: 6/18/2024  EXAMINATION: XR CHEST AP PORTABLE CLINICAL HISTORY: Shortness of breath; FINDINGS: Portable chest with comparison chest x-ray 05/31/2024.  Unchanged enlarged cardiomediastinal silhouette.  There is prominence of the central vascular structures.  Hazy ill-defined opacification of the right lung base noted.Lungs are clear. Pulmonary vasculature is normal. No acute osseous abnormality.     1. Unchanged enlarged cardiomediastinal silhouette. 2. Hazy opacification of the right lung base could reflect atelectasis, infiltrate, pleural effusion or combination there of. Electronically signed by: Damian Perez Date:    06/18/2024 Time:    13:20    X-Ray Chest PA And Lateral    Result Date: 5/31/2024  EXAMINATION: XR CHEST PA AND LATERAL CLINICAL HISTORY: Chronic kidney disease, stage 5 TECHNIQUE: PA and lateral views of the chest were performed. COMPARISON: Chest x-ray of March 14, 2024 FINDINGS: There is cardiomegaly in a biventricular pattern.  There is prominence of the pulmonary vasculature.  Pulmonary edema is not presently seen.  No pneumothorax or pleural effusion is noted.     Cardiomegaly and prominence of the pulmonary vasculature may represent borderline heart failure otherwise negative chest Electronically signed by: Natanael Jenkins MD Date:    05/31/2024 Time:    14:25  - pulls last radiology orders      Assessment/Plan:      * Chronic kidney disease (CKD), stage V  Patient had PD cath placed 2 weeks ago however due to insurance situation never received dialysis  Tunneled dialysis catheter was placed 6/22  Revision 6/23  Patient has persistent oozing from dialysis catheter, we will discontinue full-dose Lovenox now.  Patient has persistent hypotension during dialysis, we will discontinue Coreg now.  Discontinue diuretics since patient is  "started on dialysis, regardless patient is anuric.       Acute hypoxic respiratory failure  Patient has severe fluid overload secondary to ESRD, no need hemodialysis  Likely multifactorial from CHF with worsening CKD  EF 40-45% with elevated BNP  Currently on Furosemide and metolazone with poor urine output,plan for dialysis on 6/22 ,    Very poor response to Lasix drip  Status post tunnel line 6/22  Nephrology is following      CHF (congestive heart failure)  EF 40-45% with elevated BNP  Currently on Furosemide and metolazone with poor urine output,plan for dialysis on 6/22 with consult to vascular surgery for tunnel line   Nephrology is following       Morbid obesity  Body mass index is 45.73 kg/m². Morbid obesity complicates all aspects of disease management from diagnostic modalities to treatment.     Renal cell carcinoma of right kidney  Aware       Paroxysmal atrial fibrillation  Chronic stable issue  Continue carvedilol, Holding home apixaban for now      Type 2 diabetes mellitus with hyperglycemia  Patient's FSGs are controlled on current medication regimen.  Last A1c reviewed-   Lab Results   Component Value Date    HGBA1C 5.4 01/22/2024     Most recent fingerstick glucose reviewed- No results for input(s): "POCTGLUCOSE" in the last 24 hours.  Current correctional scale  Medium  Maintain anti-hyperglycemic dose as follows-   Antihyperglycemics (From admission, onward)      Start     Stop Route Frequency Ordered    06/18/24 1619  insulin aspart U-100 pen 0-10 Units         -- SubQ Before meals & nightly PRN 06/18/24 1519          Hold Oral hypoglycemics while patient is in the hospital.    Essential hypertension  Chronic, controlled. Latest blood pressure and vitals reviewed-     Temp:  [98.6 °F (37 °C)]   Pulse:  [104-106]   Resp:  [20-22]   BP: (138-162)/()   SpO2:  [89 %-97 %] .   Home meds for hypertension were reviewed and noted below.   Hypertension Medications               carvediloL (COREG) 25 " MG tablet Take 1 tablet (25 mg total) by mouth 2 (two) times daily.    cloNIDine (CATAPRES) 0.1 MG tablet Take 1 tablet (0.1 mg total) by mouth 3 (three) times daily as needed (PRN SBP > 165 mmHg).    cloNIDine 0.1 mg/24 hr td ptwk (CATAPRES) 0.1 mg/24 hr Place 1 patch onto the skin every 7 days.    furosemide (LASIX) 40 MG tablet Take 40 mg by mouth once daily.    amLODIPine (NORVASC) 10 MG tablet Take 10 mg by mouth once daily.    nitroGLYCERIN (NITROSTAT) 0.4 MG SL tablet Place 1 tablet (0.4 mg total) under the tongue every 5 (five) minutes as needed for Chest pain.            While in the hospital, will manage blood pressure as follows; Continue home antihypertensive regimen    Will utilize p.r.n. blood pressure medication only if patient's blood pressure greater than 140/90 and she develops symptoms such as worsening chest pain or shortness of breath.    Coronary artery disease  Stable  Continue with medical management      VTE Risk Mitigation (From admission, onward)           Ordered     IP VTE HIGH RISK PATIENT  Once         06/18/24 1519     Place sequential compression device  Until discontinued         06/18/24 1519                    Discharge Planning   HARINI: 6/26/2024     Code Status: Full Code   Is the patient medically ready for discharge?:     Reason for patient still in hospital (select all that apply): Patient trending condition and Treatment  Discharge Plan A: Home with family                  Cholo Parekh MD  Department of Hospital Medicine   [facility]

## 2024-06-23 NOTE — SUBJECTIVE & OBJECTIVE
Interval History:   Seen in the multidisciplinary rounds, more awake, status post revision of the dialysis catheter this morning, persistent oozing at dialysis catheter site.  Patient has persistent hypotension during dialysis that only take out 0.5 L yesterday.  Still fluid overload.    Review of Systems   Unable to perform ROS: Mental status change   All other systems reviewed and are negative.    Objective:     Vital Signs (Most Recent):  Temp: 97.4 °F (36.3 °C) (06/23/24 1100)  Pulse: (!) 112 (06/23/24 1130)  Resp: 20 (06/23/24 1130)  BP: 108/73 (06/23/24 1130)  SpO2: 100 % (06/23/24 1130) Vital Signs (24h Range):  Temp:  [97 °F (36.1 °C)-98.2 °F (36.8 °C)] 97.4 °F (36.3 °C)  Pulse:  [] 112  Resp:  [17-24] 20  SpO2:  [90 %-100 %] 100 %  BP: ()/() 108/73     Weight: (!) 147.9 kg (326 lb 1 oz)  Body mass index is 51.07 kg/m².    Intake/Output Summary (Last 24 hours) at 6/23/2024 1325  Last data filed at 6/23/2024 1127  Gross per 24 hour   Intake 525 ml   Output 1175 ml   Net -650 ml         Physical Exam  Constitutional:       Appearance: She is ill-appearing.   Cardiovascular:      Pulses: Normal pulses.   Pulmonary:      Breath sounds: Rales present.   Musculoskeletal:         General: Swelling present.   Neurological:      Mental Status: She is oriented to person, place, and time.             Significant Labs: All pertinent labs within the past 24 hours have been reviewed.    Significant Imaging: I have reviewed all pertinent imaging results/findings within the past 24 hours.    X-Ray Chest 1 View    Result Date: 6/22/2024  EXAMINATION: XR CHEST 1 VIEW CLINICAL HISTORY: tunneled catheter;Chronic kidney disease, stage 5 COMPARISON: June 22 12:19 hours FINDINGS: AP view demonstrates stable enlarged cardiac silhouette and unchanged mediastinum.  Central pulmonary vascular congestion and bilateral pulmonary opacities are unchanged. Hemo split catheter is unchanged in position, with distal portions  appearing to diverge.  The distal-most portion of the catheter is again noted to be angulated slightly superiorly and toward midline.     1. Unchanged positioning of left internal jugular hemo split catheter. Electronically signed by: Ricardo Roper Date:    06/22/2024 Time:    13:30    X-Ray Chest AP Portable    Result Date: 6/22/2024  EXAMINATION: XR CHEST AP PORTABLE CLINICAL HISTORY: hd placement; COMPARISON: June 20, 2024 FINDINGS: Enlarged cardiac silhouette is unchanged.  The mediastinum is unremarkable.  Left internal jugular central venous catheter is been placed.  The catheter extends to the level of the superior vena cava, where the distal portion curves superiorly and towards midline.  No pneumothorax is identified. Hazy increased density in the lower right hemithorax consistent with a combination of airspace disease and pleural fluid accumulation is slightly increased.  Faint perihilar infiltrate on the left is stable.     1. Left IJ central catheter placement without pneumothorax.  The catheter tip is abnormally positioned, coursing superiorly and towards midline as above. 2. Slightly increasing airspace disease and small pleural effusion on the right.  Stable mild left perihilar infiltrate. Electronically signed by: Ricardo Roper Date:    06/22/2024 Time:    12:51    Echo    Result Date: 6/20/2024    Left Ventricle: The left ventricle is moderately dilated. Moderately increased wall thickness. There is moderate concentric hypertrophy. Moderate global hypokinesis present. There is mildly reduced systolic function with a visually estimated ejection fraction of 40 - 45%. Unable to assess diastolic function due to atrial fibrillation.   Right Ventricle: Normal right ventricular cavity size. Wall thickness is normal. Systolic function is borderline low.   Left Atrium: Left atrium is mildly dilated.   Right Atrium: Right atrium is mildly dilated.   Mitral Valve: There is mild regurgitation with a  centrally directed jet.   Tricuspid Valve: There is mild regurgitation with a centrally directed jet.   Pulmonary Artery: There is moderate to severe pulmonary hypertension. The estimated pulmonary artery systolic pressure is 56 mmHg.   IVC/SVC: Elevated venous pressure at 15 mmHg.     X-Ray Chest AP Portable    Result Date: 6/20/2024  EXAMINATION: XR CHEST AP PORTABLE CLINICAL HISTORY: fluid overload; COMPARISON: June 18 23 FINDINGS: Cardiomegaly is stable.  The mediastinum is unremarkable.  Prominence of the central pulmonary vasculature is stable.  Small right pleural effusion appears slightly increased, with mild right basilar atelectasis or infiltrate.     1. Small right pleural effusion appears slightly increased.  No other significant changes. Electronically signed by: Ricardo Roper Date:    06/20/2024 Time:    10:40    X-Ray Chest AP    Result Date: 6/18/2024  EXAMINATION: XR CHEST AP PORTABLE CLINICAL HISTORY: Shortness of breath; FINDINGS: Portable chest with comparison chest x-ray 05/31/2024.  Unchanged enlarged cardiomediastinal silhouette.  There is prominence of the central vascular structures.  Hazy ill-defined opacification of the right lung base noted.Lungs are clear. Pulmonary vasculature is normal. No acute osseous abnormality.     1. Unchanged enlarged cardiomediastinal silhouette. 2. Hazy opacification of the right lung base could reflect atelectasis, infiltrate, pleural effusion or combination there of. Electronically signed by: Damian Perez Date:    06/18/2024 Time:    13:20    X-Ray Chest PA And Lateral    Result Date: 5/31/2024  EXAMINATION: XR CHEST PA AND LATERAL CLINICAL HISTORY: Chronic kidney disease, stage 5 TECHNIQUE: PA and lateral views of the chest were performed. COMPARISON: Chest x-ray of March 14, 2024 FINDINGS: There is cardiomegaly in a biventricular pattern.  There is prominence of the pulmonary vasculature.  Pulmonary edema is not presently seen.  No pneumothorax or  pleural effusion is noted.     Cardiomegaly and prominence of the pulmonary vasculature may represent borderline heart failure otherwise negative chest Electronically signed by: Natanael Jenkins MD Date:    05/31/2024 Time:    14:25  - pulls last radiology orders

## 2024-06-23 NOTE — NURSING
Bleeding noted to left chest wall incision. Notified hospitalist MD Iglesia came to bedside and ordered for this nurse to contact vascular surgeon MD Gaby. Notified vascular surgeon with order to provide sterile noemi and silk tape at bedside would come to look at site. Notified patient and family.

## 2024-06-23 NOTE — ANESTHESIA POSTPROCEDURE EVALUATION
Anesthesia Post Evaluation    Patient: Juhi Taylor    Procedure(s) Performed: Procedure(s) (LRB):  Insertion,catheter,tunneled (N/A)    Final Anesthesia Type: general      Patient location during evaluation: PACU  Patient participation: Yes- Able to Participate  Level of consciousness: awake and alert and oriented  Post-procedure vital signs: reviewed and stable  Pain management: adequate  Airway patency: patent    PONV status at discharge: No PONV  Anesthetic complications: no      Cardiovascular status: blood pressure returned to baseline, hemodynamically stable and stable  Respiratory status: unassisted, spontaneous ventilation and room air  Hydration status: euvolemic  Follow-up not needed.              Vitals Value Taken Time   /71 06/23/24 1115   Temp 36.3 °C (97.4 °F) 06/23/24 1100   Pulse 101 06/23/24 1123   Resp 21 06/23/24 1123   SpO2 100 % 06/23/24 1123   Vitals shown include unfiled device data.      No case tracking events are documented in the log.      Pain/Kg Score: Pain Rating Prior to Med Admin: 5 (6/22/2024 11:10 AM)  Kg Score: 9 (6/23/2024 11:15 AM)

## 2024-06-23 NOTE — CARE UPDATE
06/22/24 2131   Patient Assessment/Suction   Level of Consciousness (AVPU) alert   Respiratory Effort Normal;Unlabored   Expansion/Accessory Muscles/Retractions expansion symmetric;no retractions;no use of accessory muscles   All Lung Fields Breath Sounds clear   PRE-TX-O2   Device (Oxygen Therapy) Oxymask   $ Is the patient on Low Flow Oxygen? Yes   Flow (L/min) (Oxygen Therapy) 2   SpO2 (!) 92 %   Pulse Oximetry Type Intermittent   $ Pulse Oximetry - Multiple Charge Pulse Oximetry - Multiple   Pulse (!) 115   Resp 18   Incentive Spirometer   $ Incentive Spirometer Charges unable to perform   Incentive Spirometer Predicted Level (mL) 1000   Administration (IS) unable to perform   Number of Repetitions (IS)   (still very sedated)

## 2024-06-23 NOTE — ANESTHESIA PREPROCEDURE EVALUATION
06/23/2024  Juhi Taylor is a 73 y.o., female.    Patient Active Problem List   Diagnosis    Coronary artery disease    Hyperlipemia    Essential hypertension    Type 2 diabetes mellitus with microalbuminuria, without long-term current use of insulin    Morbid obesity with body mass index (BMI) greater than or equal to 50    Elevated blood pressure reading with diagnosis of hypertension    Lower leg edema    NICOLE (obstructive sleep apnea)    Non-rheumatic mitral regurgitation    Diastolic dysfunction    Statin intolerance    Thyroid nodule    Vitamin D deficiency    Hypothyroidism    Gout    Hyperparathyroidism    Goiter    Thyroiditis    Abnormal thyroid blood test    Nodular thyroid disease    Hyperuricemia    Type 2 diabetes mellitus with hyperglycemia    Proteinuria    Right kidney mass    GERD (gastroesophageal reflux disease)    Pulmonary nodule    Congestive heart failure    SOB (shortness of breath)    S/P coronary artery stent placement    History of breast cancer in female    Left mastectomy site mass    Obesity, Class III, BMI 40-49.9 (morbid obesity)    Osteopenia    Postmenopausal    Pulmonary hypertension    Heart failure with preserved ejection fraction    Long term (current) use of antithrombotics/antiplatelets    Diastolic heart failure secondary to hypertension    Osteoarthritis of multiple joints    Vitamin D deficient osteomalacia    Hypoparathyroidism due to impaired secretion of parathyroid hormone (PTH)    Acute on chronic systolic CHF (congestive heart failure)    Elevated BUN    Long term current use of amiodarone    Paroxysmal atrial fibrillation    Situational mixed anxiety and depressive disorder    Iron deficiency anemia    Chronic back pain    Peripheral vascular disease, unspecified    Arthralgia of multiple joints     "Drug side effects, initial encounter    Renal cell carcinoma of right kidney    Benign hypertensive heart and kidney disease with CHF, NYHA class 2 and CKD stage 4    CKD stage 4 secondary to hypertension    H/O right nephrectomy    Hypomagnesemia    Chronic kidney disease (CKD), stage V    Chronic combined systolic and diastolic heart failure    Chronic renal failure (CRF), stage 4 (severe)    Myalgia due to statin    Hyperparathyroidism, secondary renal    CKD (chronic kidney disease), stage IV    Acute hypoxic respiratory failure    Morbid obesity    CHF (congestive heart failure)       Past Surgical History:   Procedure Laterality Date    AORTOGRAPHY N/A 12/04/2020    Procedure: Aortogram;  Surgeon: Paul Pedersen MD;  Location: [facility] CATH;  Service: Cardiology;  Laterality: N/A;    BREAST SURGERY      CARDIAC CATHETERIZATION  12/2020    CHOLECYSTECTOMY      COLONOSCOPY      multi -last 2014     CORONARY ARTERY BYPASS GRAFT      ESOPHAGOGASTRODUODENOSCOPY      2012     HAND SURGERY Right     INSERTION, CATHETER, DIALYSIS, PERITONEAL N/A 6/4/2024    Procedure: IN Insertion Catheter, Dialysis, Peritoneal - laparoscopic;  Surgeon: Rodriguez Catalan Jr., MD;  Location: [facility] OR;  Service: General;  Laterality: N/A;    LAPAROSCOPIC ROBOT-ASSISTED SURGICAL REMOVAL OF KIDNEY USING DA CHELLE XI Right 03/10/2022    Procedure: XI ROBOTIC NEPHRECTOMY- radical;  Surgeon: Rolando Ramirez MD;  Location: [facility] OR;  Service: Urology;  Laterality: Right;    MASTECTOMY W/ SENTINEL NODE BIOPSY Bilateral 01/21/2015    bilateral "dog ears"    NASAL SINUS SURGERY  2015    Dr Bryant FESS/cauterization turbinate     PARTIAL HYSTERECTOMY  1989    PERCUTANEOUS TRANSLUMINAL BALLOON ANGIOPLASTY OF CORONARY ARTERY  12/04/2020    Procedure: Angioplasty-coronary;  Surgeon: Paul Pedersen MD;  Location: [facility] CATH;  Service: Cardiology;;    RENAL BIOPSY Right     9/20/2021 EJ    TUBAL LIGATION      ULTRASOUND " GUIDANCE  12/04/2020    Procedure: Ultrasound Guidance;  Surgeon: Paul Pedersen MD;  Location: [facility] CATH;  Service: Cardiology;;        Tobacco Use:  The patient  reports that she quit smoking about 7 years ago. Her smoking use included cigarettes. She started smoking about 7 years ago. She has never used smokeless tobacco.     Results for orders placed or performed during the hospital encounter of 06/18/24   EKG 12-lead    Collection Time: 06/18/24 12:03 PM   Result Value Ref Range    QRS Duration 146 ms    [facility] QTC Calculation 447 ms    Narrative    Test Reason : R06.02,    Vent. Rate : 109 BPM     Atrial Rate : 108 BPM     P-R Int : 000 ms          QRS Dur : 146 ms      QT Int : 332 ms       P-R-T Axes : 000 -49 131 degrees     QTc Int : 447 ms    Atrial fibrillation with rapid ventricular response with premature  ventricular or aberrantly conducted complexes  Left bundle branch block  Abnormal ECG  When compared with ECG of 14-MAR-2024 16:46,  Atrial fibrillation has replaced Sinus rhythm  Vent. rate has increased BY  39 BPM  Confirmed by Padmini BACA, Ambrosio BRYAN ([id number]) on 6/18/2024 10:17:25 PM    Referred By: [email or web address]   SELF           Confirmed By:Ambrosio Washington MD        Imaging Results              X-Ray Chest AP (Final result)  Result time 06/18/24 13:20:49      Final result by Damian Perez DO (06/18/24 13:20:49)                   Impression:      1. Unchanged enlarged cardiomediastinal silhouette.  2. Hazy opacification of the right lung base could reflect atelectasis, infiltrate, pleural effusion or combination there of.      Electronically signed by: Damian Perez  Date:    06/18/2024  Time:    13:20               Narrative:    EXAMINATION:  XR CHEST AP PORTABLE    CLINICAL HISTORY:  Shortness of breath;    FINDINGS:  Portable chest with comparison chest x-ray 05/31/2024.  Unchanged enlarged cardiomediastinal silhouette.  There is prominence of the central vascular structures.  Hazy ill-defined  opacification of the right lung base noted.Lungs are clear. Pulmonary vasculature is normal. No acute osseous abnormality.                                       Lab Results   Component Value Date    WBC 8.61 06/23/2024    HGB 12.1 06/23/2024    HCT 43.2 06/23/2024    MCV 91 06/23/2024     06/23/2024     BMP  Lab Results   Component Value Date     06/23/2024    K 5.3 (H) 06/23/2024     06/23/2024    CO2 22 (L) 06/23/2024    BUN 90 (H) 06/23/2024    CREATININE 4.6 (H) 06/23/2024    CALCIUM 9.1 06/23/2024    ANIONGAP 9 06/23/2024     (H) 06/23/2024     (H) 06/22/2024     (H) 06/21/2024       Results for orders placed during the hospital encounter of 06/18/24    Echo    Interpretation Summary    Left Ventricle: The left ventricle is moderately dilated. Moderately increased wall thickness. There is moderate concentric hypertrophy. Moderate global hypokinesis present. There is mildly reduced systolic function with a visually estimated ejection fraction of 40 - 45%. Unable to assess diastolic function due to atrial fibrillation.    Right Ventricle: Normal right ventricular cavity size. Wall thickness is normal. Systolic function is borderline low.    Left Atrium: Left atrium is mildly dilated.    Right Atrium: Right atrium is mildly dilated.    Mitral Valve: There is mild regurgitation with a centrally directed jet.    Tricuspid Valve: There is mild regurgitation with a centrally directed jet.    Pulmonary Artery: There is moderate to severe pulmonary hypertension. The estimated pulmonary artery systolic pressure is 56 mmHg.    IVC/SVC: Elevated venous pressure at 15 mmHg.           Pre-op Assessment    I have reviewed the Patient Summary Reports.     I have reviewed the Nursing Notes. I have reviewed the NPO Status.   I have reviewed the Medications.     Review of Systems  Anesthesia Hx:  No problems with previous Anesthesia   Neg history of prior surgery.          Denies  Family Hx of Anesthesia complications.    Denies Personal Hx of Anesthesia complications.                    Social:  Former Smoker, No Alcohol Use       Hematology/Oncology:  Hematology Normal                       --  Cancer in past history:       Breast          Oncology Comments: Breast  Renal     EENT/Dental:  EENT/Dental Normal           Cardiovascular:     Hypertension   CAD       CHF    hyperlipidemia                             Pulmonary:      Shortness of breath  Sleep Apnea Pulmonary hypertension          Education provided regarding risk of obstructive sleep apnea            Renal/:  Chronic Renal Disease, CKD, ESRD, Dialysis   Hx of renal cell             Hepatic/GI:  Hepatic/GI Normal                 Musculoskeletal:  Arthritis (OA)   Gout            Neurological:  Neurology Normal                                      Endocrine:  Diabetes, type 2 Hypothyroidism          Dermatological:  Skin Normal    Psych:   anxiety depression              Physical Exam  General: Well nourished and Alert    Airway:  Mallampati: II   Mouth Opening: Normal  TM Distance: Normal  Tongue: Normal  Neck ROM: Normal ROM    Dental:  Intact    Chest/Lungs:  Clear to auscultation, Normal Respiratory Rate    Heart:  Rate: Normal  Rhythm: Regular Rhythm  Sounds: Normal      Anesthesia Plan  Type of Anesthesia, risks & benefits discussed:    Anesthesia Type: Gen Natural Airway  Intra-op Monitoring Plan: Standard ASA Monitors  Post Op Pain Control Plan: multimodal analgesia  Induction:  IV  Informed Consent: Informed consent signed with the Patient and all parties understand the risks and agree with anesthesia plan.  All questions answered. Patient consented to blood products? Yes  ASA Score: 3  Anesthesia Plan Notes: Tylenol 1 gm  EF 40 %      Ready For Surgery From Anesthesia Perspective.     .

## 2024-06-23 NOTE — NURSING
at bedside and placed pressure dressing to site. Stated bandage could be left in place until late tonight or tomorrow. Family and patient aware.

## 2024-06-23 NOTE — PLAN OF CARE
Problem: Infection  Goal: Absence of Infection Signs and Symptoms  Outcome: Progressing     Problem: Adult Inpatient Plan of Care  Goal: Plan of Care Review  Outcome: Progressing  Goal: Patient-Specific Goal (Individualized)  Outcome: Progressing  Goal: Absence of Hospital-Acquired Illness or Injury  Outcome: Progressing  Goal: Optimal Comfort and Wellbeing  Outcome: Progressing  Goal: Readiness for Transition of Care  Outcome: Progressing     Problem: Bariatric Environmental Safety  Goal: Safety Maintained with Care  Outcome: Progressing     Problem: Fall Injury Risk  Goal: Absence of Fall and Fall-Related Injury  Outcome: Progressing     Problem: Skin Injury Risk Increased  Goal: Skin Health and Integrity  Outcome: Progressing     Problem: Diabetes Comorbidity  Goal: Blood Glucose Level Within Targeted Range  Outcome: Progressing     Problem: Wound  Goal: Optimal Coping  Outcome: Progressing  Goal: Optimal Functional Ability  Outcome: Progressing  Goal: Absence of Infection Signs and Symptoms  Outcome: Progressing  Goal: Improved Oral Intake  Outcome: Progressing  Goal: Optimal Pain Control and Function  Outcome: Progressing  Goal: Skin Health and Integrity  Outcome: Progressing  Goal: Optimal Wound Healing  Outcome: Progressing     Problem: Hemodialysis  Goal: Safe, Effective Therapy Delivery  Outcome: Progressing  Goal: Effective Tissue Perfusion  Outcome: Progressing  Goal: Absence of Infection Signs and Symptoms  Outcome: Progressing

## 2024-06-23 NOTE — CONSULTS
[facility]  [facility]    SUBJECTIVE:     Reason for Consult/Chief Complaint:  Need for dialysis access placed in    History of Present Illness:  Juhi Taylor is a 73 y.o. female with a history of end-stage renal disease, CHF, CAD, diabetes, and pulmonary hypertension who was initially on peritoneal dialysis.  Unfortunately peritoneal dialysis has not been sufficient enough for due to complications with the catheter leaking and insurance issues with getting training.  She presented to the ER with increasing shortness of breath and volume overload.  She is currently being diuresed.      Review of patient's allergies indicates:   Allergen Reactions    Percocet [oxycodone-acetaminophen] Itching    Amiodarone analogues      Itching      Irbesartan Swelling    Januvia [sitagliptin]     Jardiance [empagliflozin]      Leg cramps    Lipitor [atorvastatin] Other (See Comments)     Severe leg pain    Linaclotide Other (See Comments) and Nausea And Vomiting     Does not remember    Lubiprostone Other (See Comments) and Palpitations     Does not remember       Past Medical History:   Diagnosis Date    Anticoagulant long-term use     Arthritis     Breast cancer 2014    invasive lobular carcinoma    Cancer of kidney 11/2020    RIGHT KIDNEY CANCER    CHF (congestive heart failure)     Coronary artery disease dx 2005    Depression     Diabetes mellitus     Diastolic heart failure secondary to hypertension     Gout     Hyperlipemia     Hypertension     Hypertrophy of nasal turbinates     Kidney mass 2020    Right    Levoscoliosis     Lung nodule     left    Multiple thyroid nodules     NICOLE (obstructive sleep apnea)     uses C-PAP    Pulmonary hypertension      Past Surgical History:   Procedure Laterality Date    AORTOGRAPHY N/A 12/04/2020    Procedure: Aortogram;  Surgeon: Paul Pedersen MD;  Location: [facility];  Service: Cardiology;  Laterality: N/A;    BREAST SURGERY       "CARDIAC CATHETERIZATION  12/2020    CHOLECYSTECTOMY      COLONOSCOPY      multi -last 2014     CORONARY ARTERY BYPASS GRAFT      ESOPHAGOGASTRODUODENOSCOPY      2012     HAND SURGERY Right     INSERTION, CATHETER, DIALYSIS, PERITONEAL N/A 6/4/2024    Procedure: IN Insertion Catheter, Dialysis, Peritoneal - laparoscopic;  Surgeon: Rodriguez Catalan Jr., MD;  Location: [facility] OR;  Service: General;  Laterality: N/A;    LAPAROSCOPIC ROBOT-ASSISTED SURGICAL REMOVAL OF KIDNEY USING DA CHELLE XI Right 03/10/2022    Procedure: XI ROBOTIC NEPHRECTOMY- radical;  Surgeon: Rolando Ramirez MD;  Location: [facility] OR;  Service: Urology;  Laterality: Right;    MASTECTOMY W/ SENTINEL NODE BIOPSY Bilateral 01/21/2015    bilateral "dog ears"    NASAL SINUS SURGERY  2015    Dr Bryant FESS/cauterization turbinate     PARTIAL HYSTERECTOMY  1989    PERCUTANEOUS TRANSLUMINAL BALLOON ANGIOPLASTY OF CORONARY ARTERY  12/04/2020    Procedure: Angioplasty-coronary;  Surgeon: Paul Pedersen MD;  Location: [facility] CATH;  Service: Cardiology;;    RENAL BIOPSY Right     9/20/2021 EJ    TUBAL LIGATION      ULTRASOUND GUIDANCE  12/04/2020    Procedure: Ultrasound Guidance;  Surgeon: Paul Pedersen MD;  Location: [facility] CATH;  Service: Cardiology;;     Family History   Problem Relation Name Age of Onset    Breast cancer Mother      Stroke Father Waqar Sr.     Hypertension Father Waqar Sr.     Hepatitis Brother      Asthma Daughter Nell Taylor     Birth defects Daughter Nell Brandon         Nell's two children has cleft lips    Depression Daughter Nell Taylor     Drug abuse Daughter Nell Taylor     Learning disabilities Daughter Nell Taylor     Mental illness Daughter Nell Taylor     Breast cancer Maternal Aunt      Glaucoma Sister      Drug abuse Daughter Nell     Macular degeneration Neg Hx      Retinal detachment Neg Hx       Social History     Tobacco Use    Smoking status: Former     Current packs/day: 0.00     Types: " Cigarettes     Start date: 2016     Quit date: 2016     Years since quittin.4    Smokeless tobacco: Never    Tobacco comments:     quit    Substance Use Topics    Alcohol use: No    Drug use: No        Review of Systems:  Review of Systems   All other systems reviewed and are negative.      OBJECTIVE:     Vital Signs (Most Recent):  Temp: 97.4 °F (36.3 °C) (24 1100)  Pulse: (!) 112 (24 1130)  Resp: 20 (24 1130)  BP: 108/73 (24 1130)  SpO2: 100 % (24 1130)    Physical Exam:  Physical Exam   Constitutional: She is oriented to person, place, and time. No distress.   Cardiovascular: Normal rate and normal pulses. Pulmonary:      Effort: Respiratory distress present.      Breath sounds: Wheezing present.     Abdominal:   Large obese abdomen.  PD catheter intact   Musculoskeletal:      Right lower leg: Edema present.      Left lower leg: Edema present.   Neurological: She is alert and oriented to person, place, and time.   Skin: Skin is warm. Capillary refill takes less than 2 seconds.   Psychiatric: Her behavior is normal. Mood normal.   Vitals reviewed.      Laboratory:  Lab Results   Component Value Date    WBC 8.61 2024    HGB 12.1 2024    HCT 43.2 2024     2024    CHOL 120 2024    TRIG 71 2024    HDL 40 2024    ALT 14 2024    AST 16 2024     2024    K 5.3 (H) 2024     2024    CREATININE 4.6 (H) 2024    BUN 90 (H) 2024    CO2 22 (L) 2024    TSH 0.964 2024    INR 1.1 2024    GLUF 238 (H) 2005    HGBA1C 6.8 (H) 2024             ASSESSMENT/PLAN:     73-year-old with a history of end-stage renal disease with plans to attempt peritoneal dialysis to initiate dialysis but unfortunately has not been able to for various reasons.    She now presents with progression of her disease and with volume overload.  She currently has a sufficient way to  dialyze in maintaining her volume status with Lasix it has not sufficient.    She will need a tunneled dialysis catheter so she can adequately dialyze.  Plan was to do the catheter in the orbit patient was not NPO so we will do it at the bedside.    Risks and benefits were discussed with the patient and her daughter at the bedside and they agreed to move forward with the procedure.     I did review her laboratory studies and potassium is 4.4    Meli Griffin M.D.   [facility]

## 2024-06-23 NOTE — OP NOTE
[facility]  Vascular Surgery  Procedure Note    SUMMARY       Procedure:   Left IJ tunneled dialysis catheter placement      Assisting Surgeon: None    Pre-Operative Diagnosis:  ESRD    Post-Operative Diagnosis:   Same    Anesthesia:   Local - 1% Lidocaine  Ativan and Dilaudid    Description of Technical Procedures:   Consent was obtained prior to performing this procedure.     Patient's left neck was prepped and draped in sterile fashion.  The left internal jugular was accessed under direct visualization because the right internal jugular vein did not exist.  The introducer was then passed over the wire.  The tunneled tract was infused with lidocaine.  A transverse incision was made in the left anterior chest and the 27 cm , catheter was tunneled up to the site of the introducing.  The catheter was then placed via the introducer.  There was adequate return of blood and flush with saline.  The neck incision was closed with Vicryl and Dermabond.  The catheter was secured with a nylon suture.      We did get a follow up chest x-ray and it was noted that the tip of the catheter was prolapsed up into the brachial innominate vein despite flushing and aspirating adequately.  I did try to manipulated at the bedside with x-ray but since it was not under live fluoroscopy this was very challenging.  We will plan to manipulate the catheter or replace it tomorrow in the OR    Estimated Blood Loss (EBL): minimal    Specimens: none           Condition: Good      MD Gaby  Vascular Surgery

## 2024-06-23 NOTE — ASSESSMENT & PLAN NOTE
Patient had PD cath placed 2 weeks ago however due to insurance situation never received dialysis  Tunneled dialysis catheter was placed 6/22  Revision 6/23  Patient has persistent oozing from dialysis catheter, we will discontinue full-dose Lovenox now.  Patient has persistent hypotension during dialysis, we will discontinue Coreg now.  Discontinue diuretics since patient is started on dialysis, regardless patient is anuric.

## 2024-06-23 NOTE — PLAN OF CARE
Patient taken to room via bed at this time. Drowsy but awakens easily, not distressful. NEENA Mendosa at bedside to check patient in.

## 2024-06-24 LAB
ALBUMIN SERPL BCP-MCNC: 2.8 G/DL (ref 3.5–5.2)
ALLENS TEST: ABNORMAL
ALP SERPL-CCNC: 84 U/L (ref 55–135)
ALT SERPL W/O P-5'-P-CCNC: 11 U/L (ref 10–44)
ANION GAP SERPL CALC-SCNC: 11 MMOL/L (ref 8–16)
ANION GAP SERPL CALC-SCNC: 8 MMOL/L (ref 8–16)
AST SERPL-CCNC: 19 U/L (ref 10–40)
BASOPHILS # BLD AUTO: 0.02 K/UL (ref 0–0.2)
BASOPHILS # BLD AUTO: 0.02 K/UL (ref 0–0.2)
BASOPHILS NFR BLD: 0.2 % (ref 0–1.9)
BASOPHILS NFR BLD: 0.2 % (ref 0–1.9)
BILIRUB SERPL-MCNC: 0.7 MG/DL (ref 0.1–1)
BUN SERPL-MCNC: 107 MG/DL (ref 8–23)
BUN SERPL-MCNC: 85 MG/DL (ref 8–23)
CALCIUM SERPL-MCNC: 8.6 MG/DL (ref 8.7–10.5)
CALCIUM SERPL-MCNC: 9 MG/DL (ref 8.7–10.5)
CHLORIDE SERPL-SCNC: 103 MMOL/L (ref 95–110)
CHLORIDE SERPL-SCNC: 107 MMOL/L (ref 95–110)
CO2 SERPL-SCNC: 22 MMOL/L (ref 23–29)
CO2 SERPL-SCNC: 22 MMOL/L (ref 23–29)
CREAT SERPL-MCNC: 4.8 MG/DL (ref 0.5–1.4)
CREAT SERPL-MCNC: 5.5 MG/DL (ref 0.5–1.4)
DELSYS: ABNORMAL
DIFFERENTIAL METHOD BLD: ABNORMAL
DIFFERENTIAL METHOD BLD: ABNORMAL
EOSINOPHIL # BLD AUTO: 0.1 K/UL (ref 0–0.5)
EOSINOPHIL # BLD AUTO: 0.2 K/UL (ref 0–0.5)
EOSINOPHIL NFR BLD: 1.2 % (ref 0–8)
EOSINOPHIL NFR BLD: 1.8 % (ref 0–8)
ERYTHROCYTE [DISTWIDTH] IN BLOOD BY AUTOMATED COUNT: 18.5 % (ref 11.5–14.5)
ERYTHROCYTE [DISTWIDTH] IN BLOOD BY AUTOMATED COUNT: 19.2 % (ref 11.5–14.5)
ERYTHROCYTE [SEDIMENTATION RATE] IN BLOOD BY WESTERGREN METHOD: 26 MM/H
EST. GFR  (NO RACE VARIABLE): 7.8 ML/MIN/1.73 M^2
EST. GFR  (NO RACE VARIABLE): 9.2 ML/MIN/1.73 M^2
FIO2: 80
GLUCOSE SERPL-MCNC: 124 MG/DL (ref 70–110)
GLUCOSE SERPL-MCNC: 140 MG/DL (ref 70–110)
GLUCOSE SERPL-MCNC: 157 MG/DL (ref 70–110)
GLUCOSE SERPL-MCNC: 211 MG/DL (ref 70–110)
GLUCOSE SERPL-MCNC: 241 MG/DL (ref 70–110)
HCO3 UR-SCNC: 24 MMOL/L (ref 24–28)
HCT VFR BLD AUTO: 37.5 % (ref 37–48.5)
HCT VFR BLD AUTO: 41.6 % (ref 37–48.5)
HCT VFR BLD CALC: 43 %PCV (ref 36–54)
HGB BLD-MCNC: 11 G/DL (ref 12–16)
HGB BLD-MCNC: 11.8 G/DL (ref 12–16)
IMM GRANULOCYTES # BLD AUTO: 0.05 K/UL (ref 0–0.04)
IMM GRANULOCYTES # BLD AUTO: 0.07 K/UL (ref 0–0.04)
IMM GRANULOCYTES NFR BLD AUTO: 0.5 % (ref 0–0.5)
IMM GRANULOCYTES NFR BLD AUTO: 0.8 % (ref 0–0.5)
INR PPP: 1.1 (ref 0.8–1.2)
LACTATE SERPL-SCNC: 1.6 MMOL/L (ref 0.5–1.9)
LYMPHOCYTES # BLD AUTO: 1.5 K/UL (ref 1–4.8)
LYMPHOCYTES # BLD AUTO: 1.9 K/UL (ref 1–4.8)
LYMPHOCYTES NFR BLD: 13.7 % (ref 18–48)
LYMPHOCYTES NFR BLD: 21.9 % (ref 18–48)
MAGNESIUM SERPL-MCNC: 2.6 MG/DL (ref 1.6–2.6)
MCH RBC QN AUTO: 24.8 PG (ref 27–31)
MCH RBC QN AUTO: 25.2 PG (ref 27–31)
MCHC RBC AUTO-ENTMCNC: 28.4 G/DL (ref 32–36)
MCHC RBC AUTO-ENTMCNC: 29.3 G/DL (ref 32–36)
MCV RBC AUTO: 86 FL (ref 82–98)
MCV RBC AUTO: 88 FL (ref 82–98)
MIN VOL: 10.6
MODE: ABNORMAL
MONOCYTES # BLD AUTO: 0.9 K/UL (ref 0.3–1)
MONOCYTES # BLD AUTO: 1 K/UL (ref 0.3–1)
MONOCYTES NFR BLD: 10.2 % (ref 4–15)
MONOCYTES NFR BLD: 9.4 % (ref 4–15)
NEUTROPHILS # BLD AUTO: 5.7 K/UL (ref 1.8–7.7)
NEUTROPHILS # BLD AUTO: 8.4 K/UL (ref 1.8–7.7)
NEUTROPHILS NFR BLD: 65.1 % (ref 38–73)
NEUTROPHILS NFR BLD: 75 % (ref 38–73)
NRBC BLD-RTO: 1 /100 WBC
NRBC BLD-RTO: 1 /100 WBC
PCO2 BLDA: 41.2 MMHG (ref 35–45)
PEEP: 5
PH SMN: 7.37 [PH] (ref 7.35–7.45)
PHOSPHATE SERPL-MCNC: 6 MG/DL (ref 2.7–4.5)
PIP: 38
PLATELET # BLD AUTO: 170 K/UL (ref 150–450)
PLATELET # BLD AUTO: 188 K/UL (ref 150–450)
PMV BLD AUTO: 9.7 FL (ref 9.2–12.9)
PMV BLD AUTO: 9.9 FL (ref 9.2–12.9)
PO2 BLDA: 188 MMHG (ref 80–100)
POC BE: -1 MMOL/L
POC IONIZED CALCIUM: 1.18 MMOL/L (ref 1.06–1.42)
POC SATURATED O2: 100 % (ref 95–100)
POC TCO2: 25 MMOL/L (ref 23–27)
POTASSIUM BLD-SCNC: 4 MMOL/L (ref 3.5–5.1)
POTASSIUM SERPL-SCNC: 4.1 MMOL/L (ref 3.5–5.1)
POTASSIUM SERPL-SCNC: 5.4 MMOL/L (ref 3.5–5.1)
PROT SERPL-MCNC: 5.3 G/DL (ref 6–8.4)
PROTHROMBIN TIME: 11.8 SEC (ref 9–12.5)
RBC # BLD AUTO: 4.37 M/UL (ref 4–5.4)
RBC # BLD AUTO: 4.75 M/UL (ref 4–5.4)
SAMPLE: ABNORMAL
SITE: ABNORMAL
SODIUM BLD-SCNC: 136 MMOL/L (ref 136–145)
SODIUM SERPL-SCNC: 136 MMOL/L (ref 136–145)
SODIUM SERPL-SCNC: 137 MMOL/L (ref 136–145)
SP02: 100
TROPONIN I SERPL HS-MCNC: 39.6 PG/ML (ref 0–14.9)
VT: 400
WBC # BLD AUTO: 11.11 K/UL (ref 3.9–12.7)
WBC # BLD AUTO: 8.76 K/UL (ref 3.9–12.7)

## 2024-06-24 PROCEDURE — 99291 CRITICAL CARE FIRST HOUR: CPT | Mod: ,,, | Performed by: STUDENT IN AN ORGANIZED HEALTH CARE EDUCATION/TRAINING PROGRAM

## 2024-06-24 PROCEDURE — 5A12012 PERFORMANCE OF CARDIAC OUTPUT, SINGLE, MANUAL: ICD-10-PCS | Performed by: EMERGENCY MEDICINE

## 2024-06-24 PROCEDURE — 25000003 PHARM REV CODE 250: Performed by: HOSPITALIST

## 2024-06-24 PROCEDURE — 36415 COLL VENOUS BLD VENIPUNCTURE: CPT | Performed by: STUDENT IN AN ORGANIZED HEALTH CARE EDUCATION/TRAINING PROGRAM

## 2024-06-24 PROCEDURE — 25000003 PHARM REV CODE 250: Performed by: STUDENT IN AN ORGANIZED HEALTH CARE EDUCATION/TRAINING PROGRAM

## 2024-06-24 PROCEDURE — 90935 HEMODIALYSIS ONE EVALUATION: CPT

## 2024-06-24 PROCEDURE — 5A1945Z RESPIRATORY VENTILATION, 24-96 CONSECUTIVE HOURS: ICD-10-PCS | Performed by: INTERNAL MEDICINE

## 2024-06-24 PROCEDURE — 25000003 PHARM REV CODE 250: Performed by: FAMILY MEDICINE

## 2024-06-24 PROCEDURE — 84100 ASSAY OF PHOSPHORUS: CPT | Performed by: INTERNAL MEDICINE

## 2024-06-24 PROCEDURE — 0BH17EZ INSERTION OF ENDOTRACHEAL AIRWAY INTO TRACHEA, VIA NATURAL OR ARTIFICIAL OPENING: ICD-10-PCS | Performed by: EMERGENCY MEDICINE

## 2024-06-24 PROCEDURE — 97530 THERAPEUTIC ACTIVITIES: CPT | Mod: CQ

## 2024-06-24 PROCEDURE — 36415 COLL VENOUS BLD VENIPUNCTURE: CPT | Performed by: INTERNAL MEDICINE

## 2024-06-24 PROCEDURE — 80053 COMPREHEN METABOLIC PANEL: CPT | Performed by: STUDENT IN AN ORGANIZED HEALTH CARE EDUCATION/TRAINING PROGRAM

## 2024-06-24 PROCEDURE — 94002 VENT MGMT INPAT INIT DAY: CPT

## 2024-06-24 PROCEDURE — 85025 COMPLETE CBC W/AUTO DIFF WBC: CPT | Performed by: INTERNAL MEDICINE

## 2024-06-24 PROCEDURE — 83735 ASSAY OF MAGNESIUM: CPT | Performed by: INTERNAL MEDICINE

## 2024-06-24 PROCEDURE — 93010 ELECTROCARDIOGRAM REPORT: CPT | Mod: ,,, | Performed by: INTERNAL MEDICINE

## 2024-06-24 PROCEDURE — 99900026 HC AIRWAY MAINTENANCE (STAT)

## 2024-06-24 PROCEDURE — 99900031 HC PATIENT EDUCATION (STAT)

## 2024-06-24 PROCEDURE — 85610 PROTHROMBIN TIME: CPT | Performed by: HOSPITALIST

## 2024-06-24 PROCEDURE — 63600175 PHARM REV CODE 636 W HCPCS: Performed by: HOSPITALIST

## 2024-06-24 PROCEDURE — 83605 ASSAY OF LACTIC ACID: CPT | Performed by: STUDENT IN AN ORGANIZED HEALTH CARE EDUCATION/TRAINING PROGRAM

## 2024-06-24 PROCEDURE — 94761 N-INVAS EAR/PLS OXIMETRY MLT: CPT

## 2024-06-24 PROCEDURE — 20000000 HC ICU ROOM

## 2024-06-24 PROCEDURE — 36415 COLL VENOUS BLD VENIPUNCTURE: CPT | Performed by: HOSPITALIST

## 2024-06-24 PROCEDURE — 87040 BLOOD CULTURE FOR BACTERIA: CPT | Performed by: STUDENT IN AN ORGANIZED HEALTH CARE EDUCATION/TRAINING PROGRAM

## 2024-06-24 PROCEDURE — 21400001 HC TELEMETRY ROOM

## 2024-06-24 PROCEDURE — 63600175 PHARM REV CODE 636 W HCPCS: Performed by: STUDENT IN AN ORGANIZED HEALTH CARE EDUCATION/TRAINING PROGRAM

## 2024-06-24 PROCEDURE — 51702 INSERT TEMP BLADDER CATH: CPT

## 2024-06-24 PROCEDURE — 99900035 HC TECH TIME PER 15 MIN (STAT)

## 2024-06-24 PROCEDURE — 80048 BASIC METABOLIC PNL TOTAL CA: CPT | Performed by: HOSPITALIST

## 2024-06-24 PROCEDURE — 93005 ELECTROCARDIOGRAM TRACING: CPT | Performed by: INTERNAL MEDICINE

## 2024-06-24 PROCEDURE — 84484 ASSAY OF TROPONIN QUANT: CPT | Performed by: STUDENT IN AN ORGANIZED HEALTH CARE EDUCATION/TRAINING PROGRAM

## 2024-06-24 PROCEDURE — 87205 SMEAR GRAM STAIN: CPT | Performed by: HOSPITALIST

## 2024-06-24 PROCEDURE — 27100171 HC OXYGEN HIGH FLOW UP TO 24 HOURS

## 2024-06-24 PROCEDURE — 85025 COMPLETE CBC W/AUTO DIFF WBC: CPT | Mod: 91 | Performed by: STUDENT IN AN ORGANIZED HEALTH CARE EDUCATION/TRAINING PROGRAM

## 2024-06-24 PROCEDURE — 87070 CULTURE OTHR SPECIMN AEROBIC: CPT | Performed by: HOSPITALIST

## 2024-06-24 RX ORDER — SODIUM BICARBONATE 1 MEQ/ML
SYRINGE (ML) INTRAVENOUS CODE/TRAUMA/SEDATION MEDICATION
Status: COMPLETED | OUTPATIENT
Start: 2024-06-24 | End: 2024-06-24

## 2024-06-24 RX ORDER — ETOMIDATE 2 MG/ML
INJECTION INTRAVENOUS CODE/TRAUMA/SEDATION MEDICATION
Status: COMPLETED | OUTPATIENT
Start: 2024-06-24 | End: 2024-06-24

## 2024-06-24 RX ORDER — NOREPINEPHRINE BITARTRATE/D5W 4MG/250ML
0-3 PLASTIC BAG, INJECTION (ML) INTRAVENOUS CONTINUOUS
Status: DISCONTINUED | OUTPATIENT
Start: 2024-06-24 | End: 2024-06-28

## 2024-06-24 RX ORDER — ROCURONIUM BROMIDE 10 MG/ML
INJECTION, SOLUTION INTRAVENOUS CODE/TRAUMA/SEDATION MEDICATION
Status: COMPLETED | OUTPATIENT
Start: 2024-06-24 | End: 2024-06-24

## 2024-06-24 RX ORDER — PROPOFOL 10 MG/ML
0-50 INJECTION, EMULSION INTRAVENOUS CONTINUOUS
Status: DISCONTINUED | OUTPATIENT
Start: 2024-06-24 | End: 2024-06-27

## 2024-06-24 RX ORDER — EPINEPHRINE 0.1 MG/ML
INJECTION INTRAVENOUS CODE/TRAUMA/SEDATION MEDICATION
Status: COMPLETED | OUTPATIENT
Start: 2024-06-24 | End: 2024-06-24

## 2024-06-24 RX ADMIN — AMIODARONE HYDROCHLORIDE 1 MG/MIN: 1.8 INJECTION, SOLUTION INTRAVENOUS at 07:06

## 2024-06-24 RX ADMIN — INSULIN ASPART 2 UNITS: 100 INJECTION, SOLUTION INTRAVENOUS; SUBCUTANEOUS at 12:06

## 2024-06-24 RX ADMIN — Medication 50 MEQ: at 04:06

## 2024-06-24 RX ADMIN — CEFTRIAXONE SODIUM 1 G: 1 INJECTION, POWDER, FOR SOLUTION INTRAMUSCULAR; INTRAVENOUS at 09:06

## 2024-06-24 RX ADMIN — ETOMIDATE 15 MG: 2 INJECTION, SOLUTION INTRAVENOUS at 04:06

## 2024-06-24 RX ADMIN — PROPOFOL 40 MCG/KG/MIN: 10 INJECTION, EMULSION INTRAVENOUS at 07:06

## 2024-06-24 RX ADMIN — VANCOMYCIN HYDROCHLORIDE 2000 MG: 500 INJECTION, POWDER, LYOPHILIZED, FOR SOLUTION INTRAVENOUS at 08:06

## 2024-06-24 RX ADMIN — ROCURONIUM BROMIDE 100 MG: 10 INJECTION, SOLUTION INTRAVENOUS at 04:06

## 2024-06-24 RX ADMIN — EPINEPHRINE 1 MG: 0.1 INJECTION, SOLUTION ENDOTRACHEAL; INTRACARDIAC; INTRAVENOUS at 04:06

## 2024-06-24 RX ADMIN — MUPIROCIN 1 G: 20 OINTMENT TOPICAL at 09:06

## 2024-06-24 RX ADMIN — NOREPINEPHRINE BITARTRATE 0.04 MCG/KG/MIN: 4 INJECTION, SOLUTION INTRAVENOUS at 06:06

## 2024-06-24 RX ADMIN — PROPOFOL 40 MCG/KG/MIN: 10 INJECTION, EMULSION INTRAVENOUS at 09:06

## 2024-06-24 RX ADMIN — BENZONATATE 100 MG: 100 CAPSULE ORAL at 01:06

## 2024-06-24 RX ADMIN — DEXTROSE MONOHYDRATE 25 G: 25 INJECTION, SOLUTION INTRAVENOUS at 04:06

## 2024-06-24 NOTE — PROGRESS NOTES
Pharmacokinetic Initial Assessment: IV Vancomycin    Assessment/Plan:    Initiate intravenous vancomycin with loading dose of 2000 mg once with subsequent doses when random concentrations are less than 20 mcg/mL  Desired empiric serum trough concentration is 10 to 15 mcg/mL  Draw vancomycin random level on 6/25/24 at 18:30.  Pharmacy will continue to follow and monitor vancomycin.      Please contact pharmacy at extension [phone] with any questions regarding this assessment.     Thank you for the consult,   Belle Barbozao       Patient brief summary:  Juhi Taylor is a 73 y.o. female initiated on antimicrobial therapy with IV Vancomycin for treatment of suspected lower respiratory infection    Drug Allergies:   Review of patient's allergies indicates:   Allergen Reactions    Percocet [oxycodone-acetaminophen] Itching    Amiodarone analogues      Itching      Irbesartan Swelling    Januvia [sitagliptin]     Jardiance [empagliflozin]      Leg cramps    Lipitor [atorvastatin] Other (See Comments)     Severe leg pain    Linaclotide Other (See Comments) and Nausea And Vomiting     Does not remember    Lubiprostone Other (See Comments) and Palpitations     Does not remember       Actual Body Weight:   147.9 kg    Renal Function:   Estimated Creatinine Clearance: 14.2 mL/min (A) (based on SCr of 5.5 mg/dL (H)).,        CBC (last 72 hours):  Recent Labs   Lab Result Units 06/22/24  0526 06/23/24 0442 06/24/24  0543   WBC K/uL 9.37 8.61 8.76   Hemoglobin g/dL 11.9* 12.1 11.8*   Hematocrit % 41.3 43.2 41.6   Platelets K/uL 222 193 188   Gran % % 71.9 72.8 65.1   Lymph % % 17.8* 16.6* 21.9   Mono % % 7.9 8.4 10.2   Eosinophil % % 1.9 1.7 1.8   Basophil % % 0.1 0.2 0.2   Differential Method  Automated Automated Automated       Metabolic Panel (last 72 hours):  Recent Labs   Lab Result Units 06/22/24  0526 06/23/24 0442 06/24/24  0543   Sodium mmol/L 138 137 137   Potassium mmol/L 4.4 5.3* 5.4*   Chloride mmol/L 102 106 107  "  CO2 mmol/L 27 22* 22*   Glucose mg/dL 131* 124* 124*   BUN mg/dL 119* 90* 107*   Creatinine mg/dL 5.1* 4.6* 5.5*   Albumin g/dL 3.2* 3.1*  --    Total Bilirubin mg/dL 0.6 0.7  --    Alkaline Phosphatase U/L 84 85  --    AST U/L 11 16  --    ALT U/L 13 14  --    Magnesium mg/dL 2.7* 2.4 2.6   Phosphorus mg/dL  --   --  6.0*       Drug levels (last 3 results):  No results for input(s): "VANCOMYCINRA", "VANCORANDOM", "VANCOMYCINPE", "VANCOPEAK", "VANCOMYCINTR", "VANCOTROUGH" in the last 72 hours.    Microbiologic Results:  Microbiology Results (last 7 days)       Procedure Component Value Units Date/Time    Blood culture [[id number]]     Order Status: Sent Specimen: Blood     Culture, Respiratory with Gram Stain [[id number]]     Order Status: No result Specimen: Respiratory             "

## 2024-06-24 NOTE — CARE UPDATE
06/24/24 1706   Patient Assessment/Suction   Level of Consciousness (AVPU) alert   Respiratory Effort Unlabored   Expansion/Accessory Muscles/Retractions no use of accessory muscles   All Lung Fields Breath Sounds Anterior:;Lateral:;diminished   Rhythm/Pattern, Respiratory no shortness of breath reported   Cough Frequency infrequent   Cough Type productive;congested   PRE-TX-O2   Device (Oxygen Therapy) ventilator   $ Is the patient on High Flow Oxygen? Yes   Oxygen Concentration (%) 100   Pulse Oximetry Type Intermittent   $ Pulse Oximetry - Multiple Charge Pulse Oximetry - Multiple   Airway Safety   Is Ambu Bag and Mask with Patient? Yes, Adult Ambu Bag and Mask   Suction set is at the bedside? Yes   Respiratory Interventions   Airway/Ventilation Management airway patency maintained;calming measures promoted;humidification applied;pulmonary hygiene promoted   Airway/Ventilation Support comfort measures provided;cough relief provided;dyspnea relief promoted;humidification applied;pulmonary hygiene promoted   Vent Select   Conventional Vent Y   $ Ventilator Initial 1   Charged w/in last 24h YES   Preset Conventional Ventilator Settings   Vent ID [id number]   Vent Type    Ventilation Type VC   Vent Mode A/C   Humidity HME   Set Rate 24 BPM   Vt Set 500 mL   PEEP/CPAP 5 cmH20   Waveform RAMP   Peak Flow 60 L/min   Trigger Sensitivity Flow/I-Trigger 3 L/min   Patient Ventilator Parameters   I:E Ratio Measured 1:1   Conventional Ventilator Alarms   Alarms On Y   Vt High Alarm 1200 mL   Vt Low Alarm 200 mL   Resp Rate High Alarm 40 br/min   Press High Alarm 50 cmH2O   Apnea Rate 14   Apnea Volume (mL) 0 mL   Apnea Oxygen Concentration  100   Apnea Flow Rate (L/min) 60   T Apnea 20 sec(s)   Ready to Wean/Extubation Screen   FIO2<=50 (chart decimal) (!) 1   PEEP <=8 (chart #) 5   Transport Patient   $ Transport Tech Time Charge 15 min   Time to transport 15   Oxygen Method AMBU   Transport to ICU 3024   Toleration Good    ETT Secure Yes   Ambu and mask at bedside Yes

## 2024-06-24 NOTE — PROGRESS NOTES
[facility]  Progress Note    Patient Name: Juhi Taylor  MRN: [id number]  Patient Class: IP- Inpatient   Admission Date: 6/18/2024  Length of Stay: 5 days  Attending Physician: Cholo Parekh MD  Primary Care Provider: Tony Sadler MD        Subjective:     Principal Problem:Chronic kidney disease (CKD), stage V        HPI:  73 year old pt getting admitted with fluid overload in need of dialysis  Pt had PD catheter insertion 2 weeks ago  She was supposed to start on PD for ESRD but got delayed due to insurance issues  She has oliguria/increased weight gain in past 2 weeks  Also SOB on exertion and cough  Symptoms got worse and she came to ER and got admitted  Pt was started on lasix gtt iv         Overview/Hospital Course:  Patient is a 73-year-old female who was admitted for acute hypoxic respiratory failure likely multifactorial from CKD and CHF .  Patient had PD cath placed 2 weeks ago however due to insurance situation never received dialysis. EF 40 -45% with elevated BNP. Patient was placed on Furosemide and metolazone with poor urine output eventually leading to dialysis.Nephrology is following. Discontinued antibiotics since infection was ruled out.     Interval History:   Seen in the multidisciplinary rounds, more awake, blood pressure much improved, no further oozing from the dialysis catheter, pending for dialysis today.  Daughter at bedside.  Following commands.     Review of Systems   Unable to perform ROS: Mental status change   All other systems reviewed and are negative.    Objective:     Vital Signs (Most Recent):  Temp: 98.7 °F (37.1 °C) (06/24/24 1100)  Pulse: 105 (06/24/24 1100)  Resp: 19 (06/24/24 1100)  BP: 121/83 (06/24/24 1100)  SpO2: 99 % (06/24/24 1100) Vital Signs (24h Range):  Temp:  [98 °F (36.7 °C)-98.7 °F (37.1 °C)] 98.7 °F (37.1 °C)  Pulse:  [] 105  Resp:  [18-19] 19  SpO2:  [96 %-99 %] 99 %  BP: (115-154)/() 121/83      Weight: (!) 147.9 kg (326 lb 1 oz)  Body mass index is 51.07 kg/m².  No intake or output data in the 24 hours ending 06/24/24 1407        Physical Exam  Constitutional:       Appearance: She is ill-appearing.   Cardiovascular:      Pulses: Normal pulses.   Pulmonary:      Breath sounds: Rales present.   Musculoskeletal:         General: Swelling present.   Neurological:      Mental Status: She is oriented to person, place, and time.             Significant Labs: All pertinent labs within the past 24 hours have been reviewed.    Significant Imaging: I have reviewed all pertinent imaging results/findings within the past 24 hours.    X-Ray Chest 1 View    Result Date: 6/22/2024  EXAMINATION: XR CHEST 1 VIEW CLINICAL HISTORY: tunneled catheter;Chronic kidney disease, stage 5 COMPARISON: June 22 12:19 hours FINDINGS: AP view demonstrates stable enlarged cardiac silhouette and unchanged mediastinum.  Central pulmonary vascular congestion and bilateral pulmonary opacities are unchanged. Hemo split catheter is unchanged in position, with distal portions appearing to diverge.  The distal-most portion of the catheter is again noted to be angulated slightly superiorly and toward midline.     1. Unchanged positioning of left internal jugular hemo split catheter. Electronically signed by: Ricardo Roper Date:    06/22/2024 Time:    13:30    X-Ray Chest AP Portable    Result Date: 6/22/2024  EXAMINATION: XR CHEST AP PORTABLE CLINICAL HISTORY: hd placement; COMPARISON: June 20, 2024 FINDINGS: Enlarged cardiac silhouette is unchanged.  The mediastinum is unremarkable.  Left internal jugular central venous catheter is been placed.  The catheter extends to the level of the superior vena cava, where the distal portion curves superiorly and towards midline.  No pneumothorax is identified. Hazy increased density in the lower right hemithorax consistent with a combination of airspace disease and pleural fluid accumulation is  slightly increased.  Faint perihilar infiltrate on the left is stable.     1. Left IJ central catheter placement without pneumothorax.  The catheter tip is abnormally positioned, coursing superiorly and towards midline as above. 2. Slightly increasing airspace disease and small pleural effusion on the right.  Stable mild left perihilar infiltrate. Electronically signed by: Ricardo Roper Date:    06/22/2024 Time:    12:51    Echo    Result Date: 6/20/2024    Left Ventricle: The left ventricle is moderately dilated. Moderately increased wall thickness. There is moderate concentric hypertrophy. Moderate global hypokinesis present. There is mildly reduced systolic function with a visually estimated ejection fraction of 40 - 45%. Unable to assess diastolic function due to atrial fibrillation.   Right Ventricle: Normal right ventricular cavity size. Wall thickness is normal. Systolic function is borderline low.   Left Atrium: Left atrium is mildly dilated.   Right Atrium: Right atrium is mildly dilated.   Mitral Valve: There is mild regurgitation with a centrally directed jet.   Tricuspid Valve: There is mild regurgitation with a centrally directed jet.   Pulmonary Artery: There is moderate to severe pulmonary hypertension. The estimated pulmonary artery systolic pressure is 56 mmHg.   IVC/SVC: Elevated venous pressure at 15 mmHg.     X-Ray Chest AP Portable    Result Date: 6/20/2024  EXAMINATION: XR CHEST AP PORTABLE CLINICAL HISTORY: fluid overload; COMPARISON: June 18 23 FINDINGS: Cardiomegaly is stable.  The mediastinum is unremarkable.  Prominence of the central pulmonary vasculature is stable.  Small right pleural effusion appears slightly increased, with mild right basilar atelectasis or infiltrate.     1. Small right pleural effusion appears slightly increased.  No other significant changes. Electronically signed by: Ricardo Roper Date:    06/20/2024 Time:    10:40    X-Ray Chest AP    Result Date:  6/18/2024  EXAMINATION: XR CHEST AP PORTABLE CLINICAL HISTORY: Shortness of breath; FINDINGS: Portable chest with comparison chest x-ray 05/31/2024.  Unchanged enlarged cardiomediastinal silhouette.  There is prominence of the central vascular structures.  Hazy ill-defined opacification of the right lung base noted.Lungs are clear. Pulmonary vasculature is normal. No acute osseous abnormality.     1. Unchanged enlarged cardiomediastinal silhouette. 2. Hazy opacification of the right lung base could reflect atelectasis, infiltrate, pleural effusion or combination there of. Electronically signed by: Damian Perez Date:    06/18/2024 Time:    13:20    X-Ray Chest PA And Lateral    Result Date: 5/31/2024  EXAMINATION: XR CHEST PA AND LATERAL CLINICAL HISTORY: Chronic kidney disease, stage 5 TECHNIQUE: PA and lateral views of the chest were performed. COMPARISON: Chest x-ray of March 14, 2024 FINDINGS: There is cardiomegaly in a biventricular pattern.  There is prominence of the pulmonary vasculature.  Pulmonary edema is not presently seen.  No pneumothorax or pleural effusion is noted.     Cardiomegaly and prominence of the pulmonary vasculature may represent borderline heart failure otherwise negative chest Electronically signed by: Natanael Jenkins MD Date:    05/31/2024 Time:    14:25  - pulls last radiology orders      Assessment/Plan:      * Chronic kidney disease (CKD), stage V  Patient had PD cath placed 2 weeks ago however due to insurance situation never received dialysis  Tunneled dialysis catheter was placed 6/22  Revision 6/23  Patient has persistent oozing from dialysis catheter, we will discontinue full-dose Lovenox now.  Patient has persistent hypotension during dialysis, we will discontinue Coreg now.    6/24, doing better, pending aggressive dialysis, likely we will resume Eliquis from a.m..  Case management following for dialysis chair set up.       Acute hypoxic respiratory failure  Patient has  "severe fluid overload secondary to ESRD, no need hemodialysis  Likely multifactorial from CHF with worsening CKD  EF 40-45% with elevated BNP  Currently on Furosemide and metolazone with poor urine output,plan for dialysis on 6/22 ,    Very poor response to Lasix drip  Status post tunnel line 6/22  Nephrology is following      CHF (congestive heart failure)  EF 40-45% with elevated BNP  Currently on Furosemide and metolazone with poor urine output,plan for dialysis on 6/22 with consult to vascular surgery for tunnel line   Nephrology is following       Morbid obesity  Body mass index is 45.73 kg/m². Morbid obesity complicates all aspects of disease management from diagnostic modalities to treatment.     Renal cell carcinoma of right kidney  Aware       Paroxysmal atrial fibrillation  Chronic stable issue  Continue carvedilol, Holding home apixaban for now      Type 2 diabetes mellitus with hyperglycemia  Patient's FSGs are controlled on current medication regimen.  Last A1c reviewed-   Lab Results   Component Value Date    HGBA1C 5.4 01/22/2024     Most recent fingerstick glucose reviewed- No results for input(s): "POCTGLUCOSE" in the last 24 hours.  Current correctional scale  Medium  Maintain anti-hyperglycemic dose as follows-   Antihyperglycemics (From admission, onward)      Start     Stop Route Frequency Ordered    06/18/24 1619  insulin aspart U-100 pen 0-10 Units         -- SubQ Before meals & nightly PRN 06/18/24 1519          Hold Oral hypoglycemics while patient is in the hospital.    Essential hypertension  Chronic, controlled. Latest blood pressure and vitals reviewed-     Temp:  [98.6 °F (37 °C)]   Pulse:  [104-106]   Resp:  [20-22]   BP: (138-162)/()   SpO2:  [89 %-97 %] .   Home meds for hypertension were reviewed and noted below.   Hypertension Medications               carvediloL (COREG) 25 MG tablet Take 1 tablet (25 mg total) by mouth 2 (two) times daily.    cloNIDine (CATAPRES) 0.1 MG " tablet Take 1 tablet (0.1 mg total) by mouth 3 (three) times daily as needed (PRN SBP > 165 mmHg).    cloNIDine 0.1 mg/24 hr td ptwk (CATAPRES) 0.1 mg/24 hr Place 1 patch onto the skin every 7 days.    furosemide (LASIX) 40 MG tablet Take 40 mg by mouth once daily.    amLODIPine (NORVASC) 10 MG tablet Take 10 mg by mouth once daily.    nitroGLYCERIN (NITROSTAT) 0.4 MG SL tablet Place 1 tablet (0.4 mg total) under the tongue every 5 (five) minutes as needed for Chest pain.            While in the hospital, will manage blood pressure as follows; Continue home antihypertensive regimen    Will utilize p.r.n. blood pressure medication only if patient's blood pressure greater than 140/90 and she develops symptoms such as worsening chest pain or shortness of breath.    Coronary artery disease  Stable  Continue with medical management      VTE Risk Mitigation (From admission, onward)           Ordered     IP VTE HIGH RISK PATIENT  Once         06/18/24 1519     Place sequential compression device  Until discontinued         06/18/24 1519                    Discharge Planning   HARINI: 6/26/2024     Code Status: Full Code   Is the patient medically ready for discharge?:     Reason for patient still in hospital (select all that apply): Patient trending condition and Treatment  Discharge Plan A: Rehab          Patient also found to have urinary tract infection, we will start on empiric IV Rocephin.  Follow up urine culture.         Cholo Parekh MD  Department of Hospital Medicine   [facility]

## 2024-06-24 NOTE — NURSING
1720- Gave report to Mariya CHAUDHARY, receiving nurse in ICU.       1735- Updated daughter Maria Luisa López, that pt was moved from dialysis to ICU room 3024.

## 2024-06-24 NOTE — CARE UPDATE
06/23/24 2200   Patient Assessment/Suction   Level of Consciousness (AVPU) alert   Respiratory Effort Normal;Unlabored   Expansion/Accessory Muscles/Retractions no use of accessory muscles   Cough Frequency no cough   PRE-TX-O2   Device (Oxygen Therapy) room air   SpO2 97 %   Pulse Oximetry Type Intermittent   $ Pulse Oximetry - Multiple Charge Pulse Oximetry - Multiple   Pulse 92   Resp 18   Positioning HOB elevated 30 degrees   Positioning   Body Position weight shifting   Head of Bed (HOB) Positioning HOB elevated   Positioning/Transfer Devices pillows;in use   Incentive Spirometer   $ Incentive Spirometer Charges done with encouragement   Incentive Spirometer Predicted Level (mL) 1500   Administration (IS) mouthpiece utilized   Number of Repetitions (IS) 5   Level Incentive Spirometer (mL) 1000   Patient Tolerance (IS) fair        English

## 2024-06-24 NOTE — ASSESSMENT & PLAN NOTE
Patient had PD cath placed 2 weeks ago however due to insurance situation never received dialysis  Tunneled dialysis catheter was placed 6/22  Revision 6/23  Patient has persistent oozing from dialysis catheter, we will discontinue full-dose Lovenox now.  Patient has persistent hypotension during dialysis, we will discontinue Coreg now.    6/24, doing better, pending aggressive dialysis, likely we will resume Eliquis from a.m..  Case management following for dialysis chair set up.

## 2024-06-24 NOTE — PT/OT/SLP PROGRESS
Physical Therapy Treatment    Patient Name:  Juhi Taylor   MRN:  [id number]    Recommendations:     Discharge Recommendations: High Intensity Therapy  Discharge Equipment Recommendations: to be determined by next level of care  Barriers to discharge:  increased assist with mobility, decreased activity tolerance, balance deficits    Assessment:     Juhi Taylor is a 73 y.o. female admitted with a medical diagnosis of Chronic kidney disease (CKD), stage V.  She presents with the following impairments/functional limitations: weakness, impaired endurance, impaired functional mobility, impaired self care skills, gait instability, decreased safety awareness, impaired cardiopulmonary response to activity.    Pt with HOB elevated and daughter present. Daughter encouraging pt to sit in chair as she reports pt stated she wanted to get up earlier.    Pt required min assist for supine to sit transfer. Pt performed sit to stand transfer with mod assist and personal rollator. Pt performed step transfer from bed to chair with rollator and mod assist. Pt with difficulty weight shifting and advancing BLEs requiring facilitation at hips. Pt leaning forearms on rollator requiring verbal cuing to correct, however would not maintain for long.    Pt left up in chair with daughter present and alarm on.    Rehab Prognosis: Fair; patient would benefit from acute skilled PT services to address these deficits and reach maximum level of function.    Recent Surgery: Procedure(s) (LRB):  Insertion,catheter,tunneled (N/A) 1 Day Post-Op    Plan:     During this hospitalization, patient to be seen 6 x/week to address the identified rehab impairments via gait training, therapeutic activities, therapeutic exercises and progress toward the following goals:    Plan of Care Expires:  07/19/24    Subjective     Chief Complaint: fatigue  Patient/Family Comments/goals: to get better  Pain/Comfort:  Pain Rating 1: 0/10      Objective:      Communicated with nurse prior to session.  Patient found HOB elevated with PureWick, oxygen, telemetry upon PT entry to room.     General Precautions: Standard, fall  Orthopedic Precautions: N/A  Braces: N/A  Respiratory Status: Nasal cannula, flow 3 L/min     Functional Mobility:  Bed Mobility:     Supine to Sit: minimum assistance  Transfers:     Sit to Stand:  moderate assistance with rollator  Bed to Chair: moderate assistance with  rollator  using  step turn transfer      AM-PAC 6 CLICK MOBILITY          Treatment & Education:  Pt educated on importance of time OOB, importance of intermittent mobility, safe techniques for transfers/ambulation, discharge recommendations/options, and use of call light for assistance and fall prevention.      Patient left up in chair with all lines intact, call button in reach, chair alarm on, nurse notified, and daughter present..    GOALS:   Multidisciplinary Problems       Physical Therapy Goals          Problem: Physical Therapy    Goal Priority Disciplines Outcome Goal Variances Interventions   Physical Therapy Goal     PT, PT/OT      Description: Goals to be met by: 24     Patient will increase functional independence with mobility by performin. Supine to sit with Supervision  2. Sit to stand transfer with Supervision  3. Bed to chair transfer with Supervision using Rolling Walker  4. Gait  x 150 feet with Supervision using Rolling Walker.                              Time Tracking:     PT Received On: 24  PT Start Time: 1049     PT Stop Time: 1105  PT Total Time (min): 16 min     Billable Minutes: Therapeutic Activity 16    Treatment Type: Treatment  PT/PTA: PTA     Number of PTA visits since last PT visit: 2     2024

## 2024-06-24 NOTE — PROGRESS NOTES
Nephrology Progress Note        Patient Name: Juhi Taylor  MRN: [id number]    Patient Class: IP- Inpatient   Admission Date: 6/18/2024  Length of Stay: 5 days  Date of Service: 6/24/2024    Attending Physician: Cholo Parekh MD  Primary Care Provider: Tony Sadler MD    Reason for Consult: josue    SUBJECTIVE:     HPI: 73F fallowed with Dr. Martinez from nephrology for advanced CKD stage 4-5 getting admitted with fluid overload. Pt had PD catheter insertion 2 weeks ago in preparation for initiation of dialysis, however had post-operative issues with increased leakage. Then her PD in initiation and training was delayed due to insurance issues. She has oliguria/increased weight gain in past 2 weeks, SOB on exertion and cough. Symptoms got worse and she came to ER and got admitted.     I saw her in the ER and recommend trial of lasix gtt, cassia. Will consult Dr. Catalan to evaluate patient and make recommendation for catheter care - we probably will not be able to use it for now for dialysis.    6/19 VSS. Only 175cc UO documented, but may be inaccurately charted - pt boarding in ER. Will increase lasix gtt to 10mg/hr and ensure at least q shift UO documentation.    6/20 VSS. BP low, stop Clonidine patch. Increase lasix gtt to 15mg/hr, add metolazone. Stop Fluconazole in 1-2 days after stopping IV abx for suspected PNA. Appreciate Surgery input - will d/w patient initiation of HD tomorrow if unable to produce much UO and symptomatic improvement...    6/21 VSS. Will discuss initiation of dialysis via HD, she is not responding to diuretics.    6/22 VSS. Plan for HD today after dialysis access placement. Seen on HD, BF around 275 cc/min max, otherwise tolerating well. Will have to be repositioned in cath lab by Dr. Griffin on Monday.    6/23 VSS. Getting HD cath repositioned today, plan HD tomorrow.    6/24 VSS, on 2-3L NC, first HD 6/22, second HD today    ASSESSMENT/PLAN:     CKD stage 5-  "uremia, hyperkalemia, oliguria  PD cathether in place  Initiated on RRT via IHD this admission  Volume overload/CHF exacerbation  - 2nd HD today, 3rd HD tomorrow- may need daily treatments this week- very edematous  - renal diet, 1.5L fluid restriction  - awaiting outpatient HD unit placement- plan for discharge to [facility] in interim    Anemia of CKD  - start PONCE with HD    MBD / Secondary HPT  - recheck phos  - last PTH acceptable    HTN  - UF with HD    Updated family at bedside    Thank you for allowing us to participate in the care of your patient!   We will follow the patient and provide recommendations as needed.         Laboratory:  Recent Labs   Lab 06/22/24  0526 06/23/24  0442 06/24/24  0543    137 137   K 4.4 5.3* 5.4*    106 107   CO2 27 22* 22*   * 90* 107*   CREATININE 5.1* 4.6* 5.5*   * 124* 124*       Recent Labs   Lab 06/21/24  0519 06/22/24  0526 06/23/24 0442 06/24/24  0543   CALCIUM 9.1 9.3 9.1 9.0   ALBUMIN 3.2* 3.2* 3.1*  --    MG  --  2.7* 2.4 2.6       Recent Labs   Lab 10/10/23  1113 01/22/24  1133 05/08/24  1139   PTH, Intact 583.1 H 506.7 H 291.7 H       No results for input(s): "POCTGLUCOSE" in the last 168 hours.    Recent Labs   Lab 07/05/23  1533 01/22/24  1133 06/19/24  0443   Hemoglobin A1C 5.6 5.4 6.8 H       Recent Labs   Lab 06/22/24  0526 06/23/24  0442 06/24/24  0543   WBC 9.37 8.61 8.76   HGB 11.9* 12.1 11.8*   HCT 41.3 43.2 41.6    193 188   MCV 88 91 88   MCHC 28.8* 28.0* 28.4*   MONO 7.9  0.7 8.4  0.7 10.2  0.9   EOSINOPHIL 1.9 1.7 1.8       Recent Labs   Lab 06/21/24  0519 06/22/24  0526 06/23/24  0442   BILITOT 0.5 0.6 0.7   PROT 6.1 6.1 5.8*   ALBUMIN 3.2* 3.2* 3.1*   ALKPHOS 84 84 85   ALT 10 13 14   AST 15 11 16       Recent Labs   Lab 03/17/22  2240 04/07/22  1536 04/17/23  1448 07/14/23  2026 03/14/24  2254 05/09/24  1029 06/23/24  1816   Color, UA Colorless A   < > Colorless A  --  Yellow Yellow  --    Appearance, UA Clear   " < > Clear  --  Clear Clear  --    pH, UA 5.0   < > 6.0  --  6.0 6.0  --    Specific [place], UA 1.010   < > 1.010  --  1.010 1.015  --    Protein, UA Negative   < > 1+ A  --  1+ A 1+ A  --    Glucose, UA Negative   < > Negative  --  Negative Negative  --    Ketones, UA Negative   < > Negative  --  Negative Negative  --    Urobilinogen, UA Negative  --   --   --  Negative  --   --    Bilirubin (UA) Negative   < > Negative  --  Negative Negative  --    Occult Blood UA Negative   < > Negative  --  TRACE Negative  --    Nitrite, UA Negative   < > Negative  --  Negative Negative  --    RBC, UA  --    < > 3   < > 3 1 >100 H   WBC, UA  --    < > 3   < > 5 1 >100 H   Bacteria  --    < > None   < > None Rare Many A   Hyaline Casts, UA  --    < > 0  --  0 0  --     < > = values in this interval not displayed.             Microbiology Results (last 7 days)       ** No results found for the last 168 hours. **            Review of patient's allergies indicates:   Allergen Reactions    Percocet [oxycodone-acetaminophen] Itching    Amiodarone analogues      Itching      Irbesartan Swelling    Januvia [sitagliptin]     Jardiance [empagliflozin]      Leg cramps    Lipitor [atorvastatin] Other (See Comments)     Severe leg pain    Linaclotide Other (See Comments) and Nausea And Vomiting     Does not remember    Lubiprostone Other (See Comments) and Palpitations     Does not remember       Outpatient meds:  No current facility-administered medications on file prior to encounter.     Current Outpatient Medications on File Prior to Encounter   Medication Sig Dispense Refill    allopurinoL (ZYLOPRIM) 300 MG tablet Take 1 tablet (300 mg total) by mouth once daily. 90 tablet 3    amLODIPine (NORVASC) 10 MG tablet Take 10 mg by mouth once daily.      calcitRIOL (ROCALTROL) 0.5 MCG Cap Take 0.5 mcg by mouth once daily.      carvediloL (COREG) 25 MG tablet Take 1 tablet (25 mg total) by mouth 2 (two) times daily. 180 tablet 2     cyanocobalamin (VITAMIN B-12) 100 MCG tablet Take 100 mcg by mouth once daily.      evolocumab (REPATHA SURECLICK) 140 mg/mL PnIj Inject 1 mL (140 mg total) into the skin every 14 (fourteen) days. 2 mL 11    furosemide (LASIX) 40 MG tablet Take 40 mg by mouth once daily.      loratadine (CLARITIN) 10 mg tablet Take 10 mg by mouth once daily.      magnesium oxide (MAG-OX) 400 mg (241.3 mg magnesium) tablet Take 1 tablet (400 mg total) by mouth daily as needed (cramping). 30 tablet 0    blood sugar diagnostic (TRUE METRIX GLUCOSE TEST STRIP) Strp Use 1x daily. Insurance preferred. 100 strip 3    blood sugar diagnostic Strp To check BG 1 time daily, to use with insurance preferred meter 100 strip 3    cloNIDine (CATAPRES) 0.1 MG tablet Take 1 tablet (0.1 mg total) by mouth 3 (three) times daily as needed (PRN SBP > 165 mmHg). 90 tablet 6    cloNIDine 0.1 mg/24 hr td ptwk (CATAPRES) 0.1 mg/24 hr Place 1 patch onto the skin every 7 days. 4 patch 4    lancets Misc To check BG 1 times daily, to use with insurance preferred meter 100 each 3    nitroGLYCERIN (NITROSTAT) 0.4 MG SL tablet Place 1 tablet (0.4 mg total) under the tongue every 5 (five) minutes as needed for Chest pain. 25 tablet 0    [DISCONTINUED] aspirin (ECOTRIN) 81 MG EC tablet Take 1 tablet (81 mg total) by mouth once daily. 90 tablet 3    [DISCONTINUED] DULoxetine (CYMBALTA) 20 MG capsule TAKE ONE CAPSULE BY MOUTH ONCE DAILY 30 capsule 4    [DISCONTINUED] folic acid (FOLVITE) 1 MG tablet Take 1 mg by mouth once daily.      [DISCONTINUED] metFORMIN (GLUCOPHAGE-XR) 500 MG ER 24hr tablet Take 2 tablets (1,000 mg total) by mouth 2 (two) times daily with meals. 360 tablet 3    [DISCONTINUED] sodium bicarbonate 650 MG tablet Take 1 tablet (650 mg total) by mouth once daily. 30 tablet 11       Scheduled meds:   cefTRIAXone (Rocephin) IV (PEDS and ADULTS)  1 g Intravenous Q24H    famotidine  20 mg Oral Daily    mupirocin   Nasal BID       Infusions:        PRN  meds:    Current Facility-Administered Medications:     acetaminophen, 650 mg, Oral, Q8H PRN    aluminum-magnesium hydroxide-simethicone, 30 mL, Oral, QID PRN    benzonatate, 100 mg, Oral, TID PRN    dextrose 50%, 12.5 g, Intravenous, PRN    dextrose 50%, 25 g, Intravenous, PRN    glucagon (human recombinant), 1 mg, Intramuscular, PRN    glucose, 16 g, Oral, PRN    glucose, 24 g, Oral, PRN    HYDROcodone-acetaminophen, 1 tablet, Oral, Q6H PRN    insulin aspart U-100, 0-10 Units, Subcutaneous, QID (AC + HS) PRN    melatonin, 6 mg, Oral, Nightly PRN    ondansetron, 4 mg, Intravenous, Q6H PRN    ondansetron, 4 mg, Intravenous, Daily PRN      OBJECTIVE:     Vital Signs and IO:  Temp:  [98 °F (36.7 °C)-98.7 °F (37.1 °C)]   Pulse:  []   Resp:  [18-19]   BP: (115-154)/()   SpO2:  [96 %-99 %]   I/O last 3 completed shifts:  In: 25 [IV Piggyback:25]  Out: 175 [Urine:175]  Wt Readings from Last 5 Encounters:   06/22/24 (!) 147.9 kg (326 lb 1 oz)   06/19/24 132.5 kg (292 lb 1.8 oz)   06/04/24 134.3 kg (296 lb)   05/27/24 134.3 kg (296 lb)   04/01/24 131.7 kg (290 lb 4.8 oz)     Body mass index is 51.07 kg/m².    Physical Exam  Constitutional:       General: Patient is not in acute distress.     Appearance: Patient is well-developed. She is not diaphoretic.   HENT:      Head: Normocephalic and atraumatic.      Mouth/Throat: Mucous membranes are moist.   Eyes:      General: No scleral icterus.     Pupils: Pupils are equal, round, and reactive to light.   Cardiovascular:      Rate and Rhythm: Normal rate and regular rhythm.   Pulmonary:      Effort: Pulmonary effort is normal. No respiratory distress.      Breath sounds: No stridor.   Abdominal:      General: There is no distension.      Palpations: Abdomen is soft.   Musculoskeletal:         General: No deformity. Normal range of motion.      Cervical back: Neck supple.   Skin:     General: Skin is warm and dry.      Findings: No rash present. No erythema.    Neurological:      Mental Status: Patient is alert and oriented to person, place, and time.      Cranial Nerves: No cranial nerve deficit.   Psychiatric:         Behavior: Behavior normal.          Patient care time was spent personally by me on the following activities: > 35 min    Obtaining a history.  Examination of patient.  Providing medical care at the patients bedside.  Developing a treatment plan with patient or surrogate and bedside caregivers.  Ordering and reviewing laboratory studies, radiographic studies, pulse oximetry.  Ordering and performing treatments and interventions.  Evaluation of patient's response to treatment.  Discussions with consultants while on the unit and immediately available to the patient.  Re-evaluation of the patient's condition.  Documentation in the medical record.     Hector Blakely MD    [facility]  [address] [place] [address], [place] [id number] [phone] - tel  [phone] - fax    6/24/2024

## 2024-06-24 NOTE — NURSING
Nurses Note -- 4 Eyes      6/24/2024   6:30 PM      Skin assessed during: Transfer      [x] No Altered Skin Integrity Present    [x]Prevention Measures Documented      [] Yes- Altered Skin Integrity Present or Discovered   [] LDA Added if Not in Epic (Describe Wound)   [] New Altered Skin Integrity was Present on Admit and Documented in LDA   [] Wound Image Taken    Wound Care Consulted? No    Attending Nurse:  Maggie CHAUDHARY    Second RN/Staff Member:   Jocy CHAUDHARY    No breakdown to sacral/buttocks area.  Pt does have red colored spots and small blisters to bilateral legs.

## 2024-06-24 NOTE — PLAN OF CARE
CM noted consult for new outpatient dialysis to be set up with [facility].  Dialysis packet sent to [facility] via [employer] Fax.  Awaiting dacia time.  CM following.        06/24/24 0830   Post-Acute Status   Post-Acute Authorization Dialysis   Diaylsis Status Referrals Sent

## 2024-06-24 NOTE — CONSULTS
Pulmonary/Critical Care Consult      PATIENT NAME: Juhi Taylor  MRN: [id number]  TODAY'S DATE: 2024  5:28 PM  ADMIT DATE: 2024  AGE: 73 y.o. : 1950    CONSULT REQUESTED BY: Cholo Parekh MD    REASON FOR CONSULT:   Cardiac arrest     HPI:  Ms Taylor is a 73-year-old woman who presents with cardiac arrest.    Code blue was called on  evening, she was unresponsive.  Chest compressions were already underway.  Rhythm was consistent with PE a arrest initially, after giving epinephrine, the rhythm appeared to change to ventricular tachycardia.  We cardioverted and patient achieved ROSC.  Additionallly she received bicarbonate and glucose.         Review of Systems   Unable to perform ROS: Intubated           ALLERGIES  Review of patient's allergies indicates:   Allergen Reactions    Percocet [oxycodone-acetaminophen] Itching    Amiodarone analogues      Itching      Irbesartan Swelling    Januvia [sitagliptin]     Jardiance [empagliflozin]      Leg cramps    Lipitor [atorvastatin] Other (See Comments)     Severe leg pain    Linaclotide Other (See Comments) and Nausea And Vomiting     Does not remember    Lubiprostone Other (See Comments) and Palpitations     Does not remember       INPATIENT SCHEDULED MEDICATIONS   cefTRIAXone (Rocephin) IV (PEDS and ADULTS)  1 g Intravenous Q24H    epoetin alaina (PROCRIT) injection  50 Units/kg Intravenous Every Mon, Wed, Fri    famotidine  20 mg Oral Daily    mupirocin   Nasal BID         MEDICAL AND SURGICAL HISTORY  Past Medical History:   Diagnosis Date    Anticoagulant long-term use     Arthritis     Breast cancer     invasive lobular carcinoma    Cancer of kidney 2020    RIGHT KIDNEY CANCER    CHF (congestive heart failure)     Coronary artery disease dx     Depression     Diabetes mellitus     Diastolic heart failure secondary to hypertension     Gout     Hyperlipemia     Hypertension     Hypertrophy of nasal turbinates     Kidney  "mass 2020    Right    Levoscoliosis     Lung nodule     left    Multiple thyroid nodules     NICOLE (obstructive sleep apnea)     uses C-PAP    Pulmonary hypertension      Past Surgical History:   Procedure Laterality Date    AORTOGRAPHY N/A 2020    Procedure: Aortogram;  Surgeon: Paul Pedersen MD;  Location: [facility] CATH;  Service: Cardiology;  Laterality: N/A;    BREAST SURGERY      CARDIAC CATHETERIZATION  2020    CHOLECYSTECTOMY      COLONOSCOPY      multi -last      CORONARY ARTERY BYPASS GRAFT      ESOPHAGOGASTRODUODENOSCOPY      2012     HAND SURGERY Right     INSERTION, CATHETER, DIALYSIS, PERITONEAL N/A 2024    Procedure: IN Insertion Catheter, Dialysis, Peritoneal - laparoscopic;  Surgeon: Rodriguez Catalan Jr., MD;  Location: [facility] OR;  Service: General;  Laterality: N/A;    LAPAROSCOPIC ROBOT-ASSISTED SURGICAL REMOVAL OF KIDNEY USING DA CHELLE XI Right 03/10/2022    Procedure: XI ROBOTIC NEPHRECTOMY- radical;  Surgeon: Rolando Ramirez MD;  Location: [facility] OR;  Service: Urology;  Laterality: Right;    MASTECTOMY W/ SENTINEL NODE BIOPSY Bilateral 2015    bilateral "dog ears"    NASAL SINUS SURGERY      Dr Bryant FESS/cauterization turbinate     PARTIAL HYSTERECTOMY      PERCUTANEOUS TRANSLUMINAL BALLOON ANGIOPLASTY OF CORONARY ARTERY  2020    Procedure: Angioplasty-coronary;  Surgeon: Paul Pedersen MD;  [facility];  Service: Cardiology;;    RENAL BIOPSY Right     2021 EJ    TUBAL LIGATION      ULTRASOUND GUIDANCE  2020    Procedure: Ultrasound Guidance;  Surgeon: Paul Pedersen MD;  [facility];  Service: Cardiology;;       ALCOHOL, TOBACCO AND DRUG USE  Social History     Tobacco Use   Smoking Status Former    Current packs/day: 0.00    Types: Cigarettes    Start date: 2016    Quit date: 2016    Years since quittin.4   Smokeless Tobacco Never   Tobacco Comments    quit      Social History     Substance and Sexual " Activity   Alcohol Use No     Social History     Substance and Sexual Activity   Drug Use No       FAMILY HISTORY  Family History   Problem Relation Name Age of Onset    Breast cancer Mother      Stroke Father Waqar Sr.     Hypertension Father Waqar Sr.     Hepatitis Brother      Asthma Daughter Nell Taylor     Birth defects Daughter Nell Camejo's two children has cleft lips    Depression Daughter Nell Taylor     Drug abuse Daughter Nell Taylor     Learning disabilities Bam Taylor     Mental illness Bam Taylor     Breast cancer Maternal Aunt      Glaucoma Sister      Drug abuse Daughter Nell     Macular degeneration Neg Hx      Retinal detachment Neg Hx         VITAL SIGNS (MOST RECENT)  Temp: 98.6 °F (37 °C) (06/24/24 1455)  Pulse: 110 (06/24/24 1630)  Resp: 18 (06/24/24 1455)  BP: 102/67 (06/24/24 1630)  SpO2: 99 % (06/24/24 1455)    INTAKE AND OUTPUT (LAST 24 HOURS):No intake or output data in the 24 hours ending 06/24/24 1728    WEIGHT  Wt Readings from Last 1 Encounters:   06/22/24 (!) 147.9 kg (326 lb 1 oz)       Physical Exam  Vitals reviewed.   Constitutional:       General: She is not in acute distress.     Appearance: She is well-developed. She is obese. She is ill-appearing, toxic-appearing and diaphoretic.   HENT:      Head: Normocephalic and atraumatic.      Mouth/Throat:      Pharynx: No oropharyngeal exudate or posterior oropharyngeal erythema.   Eyes:      General: No scleral icterus.     Pupils: Pupils are equal, round, and reactive to light.   Neck:      Vascular: No JVD.   Cardiovascular:      Rate and Rhythm: Normal rate and regular rhythm.      Heart sounds: Normal heart sounds. No murmur heard.  Pulmonary:      Effort: Pulmonary effort is normal. No respiratory distress.      Breath sounds: Wheezing and rales present.   Abdominal:      General: Bowel sounds are normal. There is distension.      Palpations: Abdomen is soft.       "Tenderness: There is no abdominal tenderness.   Musculoskeletal:         General: No swelling.      Cervical back: Normal range of motion and neck supple. No rigidity.      Right lower leg: Edema present.      Left lower leg: Edema present.   Skin:     General: Skin is warm.      Capillary Refill: Capillary refill takes less than 2 seconds.      Coloration: Skin is not pale.      Findings: No rash.   Neurological:      General: No focal deficit present.      Mental Status: She is alert and oriented to person, place, and time.      Cranial Nerves: No cranial nerve deficit.      Motor: No weakness or abnormal muscle tone.           ACUTE PHASE REACTANT (LAST 24 HOURS)  No results for input(s): "FERRITIN", "CRP", "LDH", "DDIMER" in the last 24 hours.    CBC LAST (LAST 24 HOURS)  Recent Labs   Lab 06/24/24  0543   WBC 8.76   RBC 4.75   HGB 11.8*   HCT 41.6   MCV 88   MCH 24.8*   MCHC 28.4*   RDW 19.2*      MPV 9.7   GRAN 65.1  5.7   LYMPH 21.9  1.9   MONO 10.2  0.9   BASO 0.02   NRBC 1*       CHEMISTRY LAST (LAST 24 HOURS)  Recent Labs   Lab 06/24/24  0543      K 5.4*      CO2 22*   ANIONGAP 8   *   CREATININE 5.5*   *   CALCIUM 9.0   MG 2.6       COAGULATION LAST (LAST 24 HOURS)  No results for input(s): "LABPT", "INR", "APTT" in the last 24 hours.    CARDIAC PROFILE (LAST 24 HOURS)  Recent Labs   Lab 06/20/24  0402   BNP 1,726*   TROPONINIHS 31.0*       LAST 7 DAYS MICROBIOLOGY   Microbiology Results (last 7 days)       Procedure Component Value Units Date/Time    Culture, Respiratory with Gram Stain [[id number]]     Order Status: No result Specimen: Respiratory             MOST RECENT IMAGING  X-Ray Chest 1 View  Narrative: EXAMINATION:  XR CHEST 1 VIEW    CLINICAL HISTORY:  post tunnel cath;    COMPARISON:  June 22, 2024    FINDINGS:  AP view demonstrates stable enlargement of the cardiac silhouette.  Mediastinal widening is likely on the basis of aortic tortuosity.  Left IJ " "central venous catheter has been repositioned.  The distal tip is now directed inferiorly with tip at the atria caval junction.  Proximal catheter tip is unchanged in position.    Small pleural effusion on the right is stable.  Faint bilateral mid and lower lung zone pulmonary opacities are not significantly changed.  Impression: 1. Repositioning of left IJ central venous catheter as above.  No other interval changes.    Electronically signed by: Ricardo Roper  Date:    06/23/2024  Time:    11:16  FL Flouro Usage  See Notes    This procedure was auto-finalized.      CURRENT VISIT EKG  Results for orders placed or performed during the hospital encounter of 06/18/24   EKG 12-lead    Narrative    Ordered by an unspecified provider.       ECHOCARDIOGRAM RESULTS  Results for orders placed during the hospital encounter of 06/18/24    Echo    Interpretation Summary    Left Ventricle: The left ventricle is moderately dilated. Moderately increased wall thickness. There is moderate concentric hypertrophy. Moderate global hypokinesis present. There is mildly reduced systolic function with a visually estimated ejection fraction of 40 - 45%. Unable to assess diastolic function due to atrial fibrillation.    Right Ventricle: Normal right ventricular cavity size. Wall thickness is normal. Systolic function is borderline low.    Left Atrium: Left atrium is mildly dilated.    Right Atrium: Right atrium is mildly dilated.    Mitral Valve: There is mild regurgitation with a centrally directed jet.    Tricuspid Valve: There is mild regurgitation with a centrally directed jet.    Pulmonary Artery: There is moderate to severe pulmonary hypertension. The estimated pulmonary artery systolic pressure is 56 mmHg.    IVC/SVC: Elevated venous pressure at 15 mmHg.        VENTILATOR INFORMATION              LAST ARTERIAL BLOOD GAS  ABG  No results for input(s): "PH", "PO2", "PCO2", "HCO3", "BE" in the last 168 hours.    ASSESSMENT:   Cardiac " arrest  Hyperkalemia   Hx of vascular disease   UTI  NICOLE and probable OHS   Chronic hypercapneic respiratory failure   7.   Ventricular tachycardia     Not clear on etiology of arrest- possibly due to concurrent infection, PE, or MI during dialysis. On ROSC- she exhibited physical signs that suggests her neuro exam was good. But we proceeded with intubation for now and are hoping to discover etiology.     PLAN:   - SAT tomorrow   - CMP, CBC, blood cultures,   - CXR   - continue vanc/ceftriaxone for now   - will place antony   - 12 lead EKG, trend troponins, and obtain ECHO - if abnormal will consult cardiology   - Continue amiodarone drip   - Continue vasopressor support  for goal MAP of 65   - Trend lactic acid       Seymour Nicole MD  Date of Service: 06/24/2024  5:28 PM    Upon my evaluation, this patient had a high probability of imminent or life-threatening deterioration, which required my direct attention, intervention, and personal management.    I have personally provided at least 35 minutes of critical care time exclusive of time spent on separately billable procedures. Over 50% of the time of this encounter was spent in direct care at the bedside. Time includes review of laboratory data, radiology results, discussion with consultants, and monitoring for potential decompensation. Interventions were performed as documented above.

## 2024-06-24 NOTE — CODE DOCUMENTATION
Date of Procedure: 06/24/2024       Procedure:  video laryngoscopy     Provider: Bayron Bennett MD     Indication: Cardiac arrest     Procedure Diagnosis:  endotracheal intubation     Description of the Findings of the Procedure:      Sedation: etomidate  Paralytic: rocuronium  Lidocaine: no.  Atropine: no.  Equipment: Darío 4 laryngoscope blade.  Cricoid Pressure: no.  Number of attempts: 1.    Tube Size: 7.5  ETT location confirmed by by auscultation and color metric capnography     Significant Surgical Tasks Conducted by the Assistant(s), if Applicable:  Pushing medication     Complications: None; patient tolerated the procedure well.     Estimated Blood Loss (EBL): None          Specimens: None       Condition: Critical        Disposition: ICU - intubated and critically ill.     Attestation: I performed the procedure.    Bayron Bennett MD

## 2024-06-24 NOTE — PT/OT/SLP PROGRESS
Occupational Therapy      Patient Name:  Juhi Taylor   MRN:  [id number]    Patient not seen today secondary to Dialysis (2 attempts made to see pt. First attempt, pt eating lunch, second attempt, pt JORJE to HD.). Will follow-up 6/25/2024.    6/24/2024

## 2024-06-24 NOTE — SUBJECTIVE & OBJECTIVE
Interval History:   Seen in the multidisciplinary rounds, more awake, blood pressure much improved, no further oozing from the dialysis catheter, pending for dialysis today.  Daughter at bedside.  Following commands.     Review of Systems   Unable to perform ROS: Mental status change   All other systems reviewed and are negative.    Objective:     Vital Signs (Most Recent):  Temp: 98.7 °F (37.1 °C) (06/24/24 1100)  Pulse: 105 (06/24/24 1100)  Resp: 19 (06/24/24 1100)  BP: 121/83 (06/24/24 1100)  SpO2: 99 % (06/24/24 1100) Vital Signs (24h Range):  Temp:  [98 °F (36.7 °C)-98.7 °F (37.1 °C)] 98.7 °F (37.1 °C)  Pulse:  [] 105  Resp:  [18-19] 19  SpO2:  [96 %-99 %] 99 %  BP: (115-154)/() 121/83     Weight: (!) 147.9 kg (326 lb 1 oz)  Body mass index is 51.07 kg/m².  No intake or output data in the 24 hours ending 06/24/24 1407        Physical Exam  Constitutional:       Appearance: She is ill-appearing.   Cardiovascular:      Pulses: Normal pulses.   Pulmonary:      Breath sounds: Rales present.   Musculoskeletal:         General: Swelling present.   Neurological:      Mental Status: She is oriented to person, place, and time.             Significant Labs: All pertinent labs within the past 24 hours have been reviewed.    Significant Imaging: I have reviewed all pertinent imaging results/findings within the past 24 hours.    X-Ray Chest 1 View    Result Date: 6/22/2024  EXAMINATION: XR CHEST 1 VIEW CLINICAL HISTORY: tunneled catheter;Chronic kidney disease, stage 5 COMPARISON: June 22 12:19 hours FINDINGS: AP view demonstrates stable enlarged cardiac silhouette and unchanged mediastinum.  Central pulmonary vascular congestion and bilateral pulmonary opacities are unchanged. Hemo split catheter is unchanged in position, with distal portions appearing to diverge.  The distal-most portion of the catheter is again noted to be angulated slightly superiorly and toward midline.     1. Unchanged positioning of left  internal jugular hemo split catheter. Electronically signed by: Ricardo Roper Date:    06/22/2024 Time:    13:30    X-Ray Chest AP Portable    Result Date: 6/22/2024  EXAMINATION: XR CHEST AP PORTABLE CLINICAL HISTORY: hd placement; COMPARISON: June 20, 2024 FINDINGS: Enlarged cardiac silhouette is unchanged.  The mediastinum is unremarkable.  Left internal jugular central venous catheter is been placed.  The catheter extends to the level of the superior vena cava, where the distal portion curves superiorly and towards midline.  No pneumothorax is identified. Hazy increased density in the lower right hemithorax consistent with a combination of airspace disease and pleural fluid accumulation is slightly increased.  Faint perihilar infiltrate on the left is stable.     1. Left IJ central catheter placement without pneumothorax.  The catheter tip is abnormally positioned, coursing superiorly and towards midline as above. 2. Slightly increasing airspace disease and small pleural effusion on the right.  Stable mild left perihilar infiltrate. Electronically signed by: Ricardo Roper Date:    06/22/2024 Time:    12:51    Echo    Result Date: 6/20/2024    Left Ventricle: The left ventricle is moderately dilated. Moderately increased wall thickness. There is moderate concentric hypertrophy. Moderate global hypokinesis present. There is mildly reduced systolic function with a visually estimated ejection fraction of 40 - 45%. Unable to assess diastolic function due to atrial fibrillation.   Right Ventricle: Normal right ventricular cavity size. Wall thickness is normal. Systolic function is borderline low.   Left Atrium: Left atrium is mildly dilated.   Right Atrium: Right atrium is mildly dilated.   Mitral Valve: There is mild regurgitation with a centrally directed jet.   Tricuspid Valve: There is mild regurgitation with a centrally directed jet.   Pulmonary Artery: There is moderate to severe pulmonary hypertension.  The estimated pulmonary artery systolic pressure is 56 mmHg.   IVC/SVC: Elevated venous pressure at 15 mmHg.     X-Ray Chest AP Portable    Result Date: 6/20/2024  EXAMINATION: XR CHEST AP PORTABLE CLINICAL HISTORY: fluid overload; COMPARISON: June 18 23 FINDINGS: Cardiomegaly is stable.  The mediastinum is unremarkable.  Prominence of the central pulmonary vasculature is stable.  Small right pleural effusion appears slightly increased, with mild right basilar atelectasis or infiltrate.     1. Small right pleural effusion appears slightly increased.  No other significant changes. Electronically signed by: Ricardo Roper Date:    06/20/2024 Time:    10:40    X-Ray Chest AP    Result Date: 6/18/2024  EXAMINATION: XR CHEST AP PORTABLE CLINICAL HISTORY: Shortness of breath; FINDINGS: Portable chest with comparison chest x-ray 05/31/2024.  Unchanged enlarged cardiomediastinal silhouette.  There is prominence of the central vascular structures.  Hazy ill-defined opacification of the right lung base noted.Lungs are clear. Pulmonary vasculature is normal. No acute osseous abnormality.     1. Unchanged enlarged cardiomediastinal silhouette. 2. Hazy opacification of the right lung base could reflect atelectasis, infiltrate, pleural effusion or combination there of. Electronically signed by: Damian Perez Date:    06/18/2024 Time:    13:20    X-Ray Chest PA And Lateral    Result Date: 5/31/2024  EXAMINATION: XR CHEST PA AND LATERAL CLINICAL HISTORY: Chronic kidney disease, stage 5 TECHNIQUE: PA and lateral views of the chest were performed. COMPARISON: Chest x-ray of March 14, 2024 FINDINGS: There is cardiomegaly in a biventricular pattern.  There is prominence of the pulmonary vasculature.  Pulmonary edema is not presently seen.  No pneumothorax or pleural effusion is noted.     Cardiomegaly and prominence of the pulmonary vasculature may represent borderline heart failure otherwise negative chest Electronically signed  by: Natanael Jenkins MD Date:    05/31/2024 Time:    14:25  - pulls last radiology orders

## 2024-06-24 NOTE — PLAN OF CARE
NAVEEN sent a message to Vijaya with [facility]. NAVEEN sent referrals to [facility], [facility], and [facility] in [facility] with the following message attached:    Pt's choices are in order per her preference:  [facility], [facility], [facility]. Please do not submit for auth. Just let me know if you will be able to accept pt.         06/24/24 1156   Post-Acute Status   Post-Acute Authorization Placement   Post-Acute Placement Status Referrals Sent   Discharge Plan   Discharge Plan A Rehab     1252- NAVEEN sent a message to Vijaya, she said she is going to meet with her and talk to her about her participation in PT/OT.     1607- NAVEEN sent follow message to Vijaya with [facility].

## 2024-06-24 NOTE — NURSING
0230- Rounded on pt. Bleeding noted to left chest wall incision. Notified hospitalist, Fabian BACA. Changed dressing. Out lined the border of the ecchymosis(bruising) with a skin marker. Bleeding noted on pt's right forearm and wrist IV. Dressings change and dated. Performed bed changed and applied a waffle mattress. Pt resting comfortably in bed and denies any needs at this time. Bed in the lowest position and locked. Call light within reach. Side rails up X's 2. Instructed pt to limit use of left arm to prevent increased bleeding and bruising.

## 2024-06-25 ENCOUNTER — CLINICAL SUPPORT (OUTPATIENT)
Dept: CARDIOLOGY | Facility: HOSPITAL | Age: 74
End: 2024-06-25
Attending: STUDENT IN AN ORGANIZED HEALTH CARE EDUCATION/TRAINING PROGRAM
Payer: MEDICARE

## 2024-06-25 VITALS — BODY MASS INDEX: 45.99 KG/M2 | WEIGHT: 293 LBS | HEIGHT: 67 IN

## 2024-06-25 PROBLEM — I46.9 CARDIAC ARREST: Status: ACTIVE | Noted: 2024-06-25

## 2024-06-25 LAB
ALLENS TEST: ABNORMAL
ANION GAP SERPL CALC-SCNC: 10 MMOL/L (ref 8–16)
BASOPHILS # BLD AUTO: 0.02 K/UL (ref 0–0.2)
BASOPHILS NFR BLD: 0.2 % (ref 0–1.9)
BSA FOR ECHO PROCEDURE: 2.64 M2
BUN SERPL-MCNC: 91 MG/DL (ref 8–23)
CALCIUM SERPL-MCNC: 8.4 MG/DL (ref 8.7–10.5)
CHLORIDE SERPL-SCNC: 103 MMOL/L (ref 95–110)
CO2 SERPL-SCNC: 23 MMOL/L (ref 23–29)
CREAT SERPL-MCNC: 5.1 MG/DL (ref 0.5–1.4)
DELSYS: ABNORMAL
DIFFERENTIAL METHOD BLD: ABNORMAL
EOSINOPHIL # BLD AUTO: 0.2 K/UL (ref 0–0.5)
EOSINOPHIL NFR BLD: 2.6 % (ref 0–8)
ERYTHROCYTE [DISTWIDTH] IN BLOOD BY AUTOMATED COUNT: 18.3 % (ref 11.5–14.5)
ERYTHROCYTE [SEDIMENTATION RATE] IN BLOOD BY WESTERGREN METHOD: 26 MM/H
EST. GFR  (NO RACE VARIABLE): 8.5 ML/MIN/1.73 M^2
FIO2: 40
GLUCOSE SERPL-MCNC: 104 MG/DL (ref 70–110)
GLUCOSE SERPL-MCNC: 107 MG/DL (ref 70–110)
GLUCOSE SERPL-MCNC: 157 MG/DL (ref 70–110)
HCO3 UR-SCNC: 23.4 MMOL/L (ref 24–28)
HCT VFR BLD AUTO: 34.1 % (ref 37–48.5)
HCT VFR BLD CALC: 41 %PCV (ref 36–54)
HGB BLD-MCNC: 10.5 G/DL (ref 12–16)
IMM GRANULOCYTES # BLD AUTO: 0.06 K/UL (ref 0–0.04)
IMM GRANULOCYTES NFR BLD AUTO: 0.7 % (ref 0–0.5)
LYMPHOCYTES # BLD AUTO: 2 K/UL (ref 1–4.8)
LYMPHOCYTES NFR BLD: 24.4 % (ref 18–48)
MAGNESIUM SERPL-MCNC: 2.4 MG/DL (ref 1.6–2.6)
MCH RBC QN AUTO: 24.9 PG (ref 27–31)
MCHC RBC AUTO-ENTMCNC: 30.8 G/DL (ref 32–36)
MCV RBC AUTO: 81 FL (ref 82–98)
MIN VOL: 10.5
MODE: ABNORMAL
MONOCYTES # BLD AUTO: 0.9 K/UL (ref 0.3–1)
MONOCYTES NFR BLD: 10.3 % (ref 4–15)
NEUTROPHILS # BLD AUTO: 5.2 K/UL (ref 1.8–7.7)
NEUTROPHILS NFR BLD: 61.8 % (ref 38–73)
NRBC BLD-RTO: 1 /100 WBC
PCO2 BLDA: 32.9 MMHG (ref 35–45)
PEEP: 5
PH SMN: 7.46 [PH] (ref 7.35–7.45)
PIP: 34
PLATELET # BLD AUTO: 153 K/UL (ref 150–450)
PMV BLD AUTO: 10.2 FL (ref 9.2–12.9)
PO2 BLDA: 93 MMHG (ref 80–100)
POC BE: 0 MMOL/L
POC IONIZED CALCIUM: 1.17 MMOL/L (ref 1.06–1.42)
POC SATURATED O2: 98 % (ref 95–100)
POC TCO2: 24 MMOL/L (ref 23–27)
POTASSIUM BLD-SCNC: 4 MMOL/L (ref 3.5–5.1)
POTASSIUM SERPL-SCNC: 3.9 MMOL/L (ref 3.5–5.1)
RBC # BLD AUTO: 4.21 M/UL (ref 4–5.4)
SAMPLE: ABNORMAL
SITE: ABNORMAL
SODIUM BLD-SCNC: 137 MMOL/L (ref 136–145)
SODIUM SERPL-SCNC: 136 MMOL/L (ref 136–145)
SP02: 100
TROPONIN I SERPL HS-MCNC: 49.8 PG/ML (ref 0–14.9)
TROPONIN I SERPL HS-MCNC: 64 PG/ML (ref 0–14.9)
TROPONIN I SERPL HS-MCNC: 65.5 PG/ML (ref 0–14.9)
TROPONIN I SERPL HS-MCNC: 76.1 PG/ML (ref 0–14.9)
VT: 400
WBC # BLD AUTO: 8.35 K/UL (ref 3.9–12.7)

## 2024-06-25 PROCEDURE — 83735 ASSAY OF MAGNESIUM: CPT | Performed by: INTERNAL MEDICINE

## 2024-06-25 PROCEDURE — 99900031 HC PATIENT EDUCATION (STAT)

## 2024-06-25 PROCEDURE — 36410 VNPNXR 3YR/> PHY/QHP DX/THER: CPT

## 2024-06-25 PROCEDURE — P9047 ALBUMIN (HUMAN), 25%, 50ML: HCPCS | Mod: JZ,JG | Performed by: INTERNAL MEDICINE

## 2024-06-25 PROCEDURE — 93005 ELECTROCARDIOGRAM TRACING: CPT | Performed by: GENERAL PRACTICE

## 2024-06-25 PROCEDURE — 84484 ASSAY OF TROPONIN QUANT: CPT | Mod: 91 | Performed by: STUDENT IN AN ORGANIZED HEALTH CARE EDUCATION/TRAINING PROGRAM

## 2024-06-25 PROCEDURE — 99900026 HC AIRWAY MAINTENANCE (STAT)

## 2024-06-25 PROCEDURE — 94003 VENT MGMT INPAT SUBQ DAY: CPT

## 2024-06-25 PROCEDURE — 36415 COLL VENOUS BLD VENIPUNCTURE: CPT | Performed by: INTERNAL MEDICINE

## 2024-06-25 PROCEDURE — 99223 1ST HOSP IP/OBS HIGH 75: CPT | Mod: 25,,, | Performed by: INTERNAL MEDICINE

## 2024-06-25 PROCEDURE — 94761 N-INVAS EAR/PLS OXIMETRY MLT: CPT

## 2024-06-25 PROCEDURE — 87340 HEPATITIS B SURFACE AG IA: CPT | Performed by: INTERNAL MEDICINE

## 2024-06-25 PROCEDURE — 93306 TTE W/DOPPLER COMPLETE: CPT | Mod: 26,,, | Performed by: INTERNAL MEDICINE

## 2024-06-25 PROCEDURE — 63600175 PHARM REV CODE 636 W HCPCS: Performed by: HOSPITALIST

## 2024-06-25 PROCEDURE — C1751 CATH, INF, PER/CENT/MIDLINE: HCPCS

## 2024-06-25 PROCEDURE — 86706 HEP B SURFACE ANTIBODY: CPT | Performed by: INTERNAL MEDICINE

## 2024-06-25 PROCEDURE — 90935 HEMODIALYSIS ONE EVALUATION: CPT

## 2024-06-25 PROCEDURE — 93306 TTE W/DOPPLER COMPLETE: CPT

## 2024-06-25 PROCEDURE — 25000003 PHARM REV CODE 250: Performed by: INTERNAL MEDICINE

## 2024-06-25 PROCEDURE — 20000000 HC ICU ROOM

## 2024-06-25 PROCEDURE — 80048 BASIC METABOLIC PNL TOTAL CA: CPT | Performed by: HOSPITALIST

## 2024-06-25 PROCEDURE — 93010 ELECTROCARDIOGRAM REPORT: CPT | Mod: ,,, | Performed by: GENERAL PRACTICE

## 2024-06-25 PROCEDURE — 63600175 PHARM REV CODE 636 W HCPCS: Mod: JZ,JG | Performed by: INTERNAL MEDICINE

## 2024-06-25 PROCEDURE — 36415 COLL VENOUS BLD VENIPUNCTURE: CPT | Performed by: STUDENT IN AN ORGANIZED HEALTH CARE EDUCATION/TRAINING PROGRAM

## 2024-06-25 PROCEDURE — 99291 CRITICAL CARE FIRST HOUR: CPT | Mod: ,,, | Performed by: STUDENT IN AN ORGANIZED HEALTH CARE EDUCATION/TRAINING PROGRAM

## 2024-06-25 PROCEDURE — 27100171 HC OXYGEN HIGH FLOW UP TO 24 HOURS

## 2024-06-25 PROCEDURE — 63600175 PHARM REV CODE 636 W HCPCS: Performed by: STUDENT IN AN ORGANIZED HEALTH CARE EDUCATION/TRAINING PROGRAM

## 2024-06-25 PROCEDURE — 21400001 HC TELEMETRY ROOM

## 2024-06-25 PROCEDURE — 85025 COMPLETE CBC W/AUTO DIFF WBC: CPT | Performed by: INTERNAL MEDICINE

## 2024-06-25 PROCEDURE — 99900035 HC TECH TIME PER 15 MIN (STAT)

## 2024-06-25 PROCEDURE — 25000003 PHARM REV CODE 250: Performed by: HOSPITALIST

## 2024-06-25 RX ORDER — ALBUMIN HUMAN 250 G/1000ML
25 SOLUTION INTRAVENOUS ONCE
Status: COMPLETED | OUTPATIENT
Start: 2024-06-25 | End: 2024-06-25

## 2024-06-25 RX ADMIN — CEFTRIAXONE SODIUM 1 G: 1 INJECTION, POWDER, FOR SOLUTION INTRAMUSCULAR; INTRAVENOUS at 09:06

## 2024-06-25 RX ADMIN — FAMOTIDINE 20 MG: 20 TABLET ORAL at 09:06

## 2024-06-25 RX ADMIN — PROPOFOL 25 MCG/KG/MIN: 10 INJECTION, EMULSION INTRAVENOUS at 09:06

## 2024-06-25 RX ADMIN — PROPOFOL 25 MCG/KG/MIN: 10 INJECTION, EMULSION INTRAVENOUS at 03:06

## 2024-06-25 RX ADMIN — ALBUMIN (HUMAN) 25 G: 12.5 SOLUTION INTRAVENOUS at 03:06

## 2024-06-25 RX ADMIN — PROPOFOL 25 MCG/KG/MIN: 10 INJECTION, EMULSION INTRAVENOUS at 07:06

## 2024-06-25 RX ADMIN — PROPOFOL 25 MCG/KG/MIN: 10 INJECTION, EMULSION INTRAVENOUS at 02:06

## 2024-06-25 RX ADMIN — PROPOFOL 30 MCG/KG/MIN: 10 INJECTION, EMULSION INTRAVENOUS at 01:06

## 2024-06-25 NOTE — ASSESSMENT & PLAN NOTE
Body mass index is 51.07 kg/m². Morbid obesity complicates all aspects of disease management from diagnostic modalities to treatment.

## 2024-06-25 NOTE — PROGRESS NOTES
Pulmonary/Critical Care Progress Note      PATIENT NAME: Juhi Taylor  MRN: [id number]  TODAY'S DATE: 2024  5:28 PM  ADMIT DATE: 2024  AGE: 74 y.o. : 1950    CONSULT REQUESTED BY: Manuel Christensen MD    REASON FOR CONSULT:   Cardiac arrest     HPI:  Ms Taylor is a 73-year-old woman who presents with cardiac arrest.    Code blue was called on  evening, she was unresponsive.  Chest compressions were already underway.  Rhythm was consistent with PE a arrest initially, after giving epinephrine, the rhythm appeared to change to ventricular tachycardia.  We cardioverted and patient achieved ROSC.  Additionallly she received bicarbonate and glucose.     Today:     SAT/SBT today, she did not follow commands, she moves extremities, was gagging and coughing aroudn the tube. Vent settings minimal.     Review of Systems   Unable to perform ROS: Intubated           ALLERGIES  Review of patient's allergies indicates:   Allergen Reactions    Percocet [oxycodone-acetaminophen] Itching    Amiodarone analogues      Itching      Irbesartan Swelling    Januvia [sitagliptin]     Jardiance [empagliflozin]      Leg cramps    Lipitor [atorvastatin] Other (See Comments)     Severe leg pain    Linaclotide Other (See Comments) and Nausea And Vomiting     Does not remember    Lubiprostone Other (See Comments) and Palpitations     Does not remember       INPATIENT SCHEDULED MEDICATIONS   cefTRIAXone (Rocephin) IV (PEDS and ADULTS)  1 g Intravenous Q24H    desmopressin (DDAVP) 14.792 mcg in 0.9% NaCl 50 mL IVPB  0.1 mcg/kg Intravenous Once    epoetin alaina (PROCRIT) injection  50 Units/kg Intravenous Every Mon, Wed, Fri    famotidine  20 mg Oral Daily      amiodarone in dextrose 5%  1 mg/min Intravenous Continuous   Stopped at 24    NORepinephrine bitartrate-D5W  0-3 mcg/kg/min Intravenous Continuous   Stopped at 24    propofoL  0-50 mcg/kg/min Intravenous Continuous 22.2 mL/hr at 24  "1420 25 mcg/kg/min at 06/25/24 1420       MEDICAL AND SURGICAL HISTORY  Past Medical History:   Diagnosis Date    Anticoagulant long-term use     Arthritis     Breast cancer 2014    invasive lobular carcinoma    Cancer of kidney 11/2020    RIGHT KIDNEY CANCER    CHF (congestive heart failure)     Coronary artery disease dx 2005    Depression     Diabetes mellitus     Diastolic heart failure secondary to hypertension     Gout     Hyperlipemia     Hypertension     Hypertrophy of nasal turbinates     Kidney mass 2020    Right    Levoscoliosis     Lung nodule     left    Multiple thyroid nodules     NICOLE (obstructive sleep apnea)     uses C-PAP    Pulmonary hypertension      Past Surgical History:   Procedure Laterality Date    AORTOGRAPHY N/A 12/04/2020    Procedure: Aortogram;  Surgeon: Paul Pedersen MD;  Location: [facility] CATH;  Service: Cardiology;  Laterality: N/A;    BREAST SURGERY      CARDIAC CATHETERIZATION  12/2020    CHOLECYSTECTOMY      COLONOSCOPY      multi -last 2014     CORONARY ARTERY BYPASS GRAFT      ESOPHAGOGASTRODUODENOSCOPY      2012     HAND SURGERY Right     INSERTION OF CATHETER N/A 6/23/2024    Procedure: Insertion,catheter;  Surgeon: Meli Griffin MD;  Location: [facility] OR;  Service: Vascular;  Laterality: N/A;  ORIGINAL CATHETER WAS REPOSITIONED.  NO NEW CATHETER WAS PLACED    INSERTION, CATHETER, DIALYSIS, PERITONEAL N/A 6/4/2024    Procedure: IN Insertion Catheter, Dialysis, Peritoneal - laparoscopic;  Surgeon: Rodriguez Caatlan Jr., MD;  Location: [facility] OR;  Service: General;  Laterality: N/A;    LAPAROSCOPIC ROBOT-ASSISTED SURGICAL REMOVAL OF KIDNEY USING DA CHELLE XI Right 03/10/2022    Procedure: XI ROBOTIC NEPHRECTOMY- radical;  Surgeon: Rolando Ramirez MD;  Location: [facility] OR;  Service: Urology;  Laterality: Right;    MASTECTOMY W/ SENTINEL NODE BIOPSY Bilateral 01/21/2015    bilateral "dog ears"    NASAL SINUS SURGERY  2015    Dr Bryant FESS/cauterization turbinate     PARTIAL " HYSTERECTOMY  1989    PERCUTANEOUS TRANSLUMINAL BALLOON ANGIOPLASTY OF CORONARY ARTERY  2020    Procedure: Angioplasty-coronary;  Surgeon: Paul Pedersen MD;  [facility];  Service: Cardiology;;    RENAL BIOPSY Right     2021 EJ    TUBAL LIGATION      ULTRASOUND GUIDANCE  2020    Procedure: Ultrasound Guidance;  Surgeon: Paul Pedersen MD;  Location: [facility] CATH;  Service: Cardiology;;       ALCOHOL, TOBACCO AND DRUG USE  Social History     Tobacco Use   Smoking Status Former    Current packs/day: 0.00    Types: Cigarettes    Start date: 2016    Quit date: 2016    Years since quittin.4   Smokeless Tobacco Never   Tobacco Comments    quit      Social History     Substance and Sexual Activity   Alcohol Use No     Social History     Substance and Sexual Activity   Drug Use No       FAMILY HISTORY  Family History   Problem Relation Name Age of Onset    Breast cancer Mother      Stroke Father Waqar Sr.     Hypertension Father Waqar Sr.     Hepatitis Brother      Asthma Daughter Nell Taylor     Birth defects Daughter Nell Camejo's two children has cleft lips    Depression Daughter Nell Taylor     Drug abuse Daughter Nell Taylor     Learning disabilities Daughter Nell Taylor     Mental illness Daughter Nell Taylor     Breast cancer Maternal Aunt      Glaucoma Sister      Drug abuse Daughter Nell     Macular degeneration Neg Hx      Retinal detachment Neg Hx         VITAL SIGNS (MOST RECENT)  Temp: 97.7 °F (36.5 °C) (24 1530)  Pulse: (!) 53 (24 1730)  Resp: (!) 22 (24 1500)  BP: 135/70 (24 1715)  SpO2: 99 % (24 1730)    INTAKE AND OUTPUT (LAST 24 HOURS):  Intake/Output Summary (Last 24 hours) at 2024 1743  Last data filed at 2024 0601  Gross per 24 hour   Intake 887.84 ml   Output 100 ml   Net 787.84 ml       WEIGHT  Wt Readings from Last 1 Encounters:   24 (!) 147.9 kg (326 lb 1 oz)  "      Physical Exam  Vitals reviewed.   Constitutional:       General: She is not in acute distress.     Appearance: She is well-developed. She is obese. She is ill-appearing, toxic-appearing and diaphoretic.   HENT:      Head: Normocephalic and atraumatic.      Mouth/Throat:      Pharynx: No oropharyngeal exudate or posterior oropharyngeal erythema.   Eyes:      General: No scleral icterus.     Pupils: Pupils are equal, round, and reactive to light.   Neck:      Vascular: No JVD.   Cardiovascular:      Rate and Rhythm: Normal rate and regular rhythm.      Heart sounds: Normal heart sounds. No murmur heard.  Pulmonary:      Effort: Pulmonary effort is normal. No respiratory distress.      Breath sounds: Wheezing and rales present.   Abdominal:      General: Bowel sounds are normal. There is distension.      Palpations: Abdomen is soft.      Tenderness: There is no abdominal tenderness.   Musculoskeletal:         General: No swelling.      Cervical back: Normal range of motion and neck supple. No rigidity.      Right lower leg: Edema present.      Left lower leg: Edema present.   Skin:     General: Skin is warm.      Capillary Refill: Capillary refill takes less than 2 seconds.      Coloration: Skin is not pale.      Findings: No rash.   Neurological:      General: No focal deficit present.      Mental Status: She is alert and oriented to person, place, and time.      Cranial Nerves: No cranial nerve deficit.      Motor: No weakness or abnormal muscle tone.           ACUTE PHASE REACTANT (LAST 24 HOURS)  No results for input(s): "FERRITIN", "CRP", "LDH", "DDIMER" in the last 24 hours.    CBC LAST (LAST 24 HOURS)  Recent Labs   Lab 06/25/24  0321 06/25/24  0519   WBC 8.35  --    RBC 4.21  --    HGB 10.5*  --    HCT 34.1* 41   MCV 81*  --    MCH 24.9*  --    MCHC 30.8*  --    RDW 18.3*  --      --    MPV 10.2  --    GRAN 61.8  5.2  --    LYMPH 24.4  2.0  --    MONO 10.3  0.9  --    BASO 0.02  --    NRBC 1*  " --        CHEMISTRY LAST (LAST 24 HOURS)  Recent Labs   Lab 06/24/24 2016 06/25/24  0321 06/25/24  0519    136  --    K 4.1 3.9  --     103  --    CO2 22* 23  --    ANIONGAP 11 10  --    BUN 85* 91*  --    CREATININE 4.8* 5.1*  --    * 104  --    CALCIUM 8.6* 8.4*  --    PH  --   --  7.461*   MG  --  2.4  --    ALBUMIN 2.8*  --   --    PROT 5.3*  --   --    ALKPHOS 84  --   --    ALT 11  --   --    AST 19  --   --    BILITOT 0.7  --   --        COAGULATION LAST (LAST 24 HOURS)  Recent Labs   Lab 06/24/24 2016   INR 1.1       CARDIAC PROFILE (LAST 24 HOURS)  Recent Labs   Lab 06/20/24  0402 06/24/24 2016 06/25/24  1308   BNP 1,726*  --   --    TROPONINIHS 31.0*   < > 76.1*    < > = values in this interval not displayed.       LAST 7 DAYS MICROBIOLOGY   Microbiology Results (last 7 days)       Procedure Component Value Units Date/Time    Culture, Respiratory with Gram Stain [[id number]] Collected: 06/24/24 1848    Order Status: Completed Specimen: Respiratory from Sputum, Expectorated Updated: 06/25/24 0721     Respiratory Culture No Growth     Gram Stain (Respiratory) <10 epithelial cells per low power field.     Gram Stain (Respiratory) Few WBC's     Gram Stain (Respiratory) No organisms seen    Blood culture [[id number]] Collected: 06/24/24 2016    Order Status: Completed Specimen: Blood from Peripheral, Hand, Right Updated: 06/25/24 0317     Blood Culture, Routine No Growth to date            MOST RECENT IMAGING  Echo    Left Ventricle: The left ventricle is normal in size. Normal wall   thickness. Severe global hypokinesis present. There is severely reduced   systolic function with a visually estimated ejection fraction of 20 - 25%.   Grade III diastolic dysfunction.    Right Ventricle: Mild right ventricular enlargement. Wall thickness is   normal. Right ventricle wall motion has global hypokinesis. Systolic   function is moderately reduced.    Left Atrium: Left atrium is moderately  dilated.    Tricuspid Valve: There is moderate regurgitation with an anteromedial   eccentrically directed jet.    Overall the study quality was technically difficult. The study was   difficult due to patient's clinical status and body habitus.  X-Ray Chest AP Portable  Narrative: EXAMINATION:  XR CHEST AP PORTABLE    CLINICAL HISTORY:  intubated;    FINDINGS:  Portable chest at 503 compared with 06/24/2024 shows unchanged support tubes and lines and cardiomediastinal silhouette.  Transcutaneous pacing pads unchanged.  Distal aspect of enteric catheter is difficult to visualize with certainty on this exam.    No new confluent alveolar consolidation, pleural effusion, or pneumothorax.  Central pulmonary vasculature unchanged.  No acute osseous abnormality.  Impression: No significant change.    Electronically signed by: Adalberto Horan  Date:    06/25/2024  Time:    07:50      CURRENT VISIT EKG  Results for orders placed or performed during the hospital encounter of 06/18/24   EKG 12-lead    Narrative    Ordered by an unspecified provider.       ECHOCARDIOGRAM RESULTS  Results for orders placed during the hospital encounter of 06/18/24    Echo    Interpretation Summary    Left Ventricle: The left ventricle is moderately dilated. Moderately increased wall thickness. There is moderate concentric hypertrophy. Moderate global hypokinesis present. There is mildly reduced systolic function with a visually estimated ejection fraction of 40 - 45%. Unable to assess diastolic function due to atrial fibrillation.    Right Ventricle: Normal right ventricular cavity size. Wall thickness is normal. Systolic function is borderline low.    Left Atrium: Left atrium is mildly dilated.    Right Atrium: Right atrium is mildly dilated.    Mitral Valve: There is mild regurgitation with a centrally directed jet.    Tricuspid Valve: There is mild regurgitation with a centrally directed jet.    Pulmonary Artery: There is moderate to severe  pulmonary hypertension. The estimated pulmonary artery systolic pressure is 56 mmHg.    IVC/SVC: Elevated venous pressure at 15 mmHg.        VENTILATOR INFORMATION  Vent Mode: A/C  Oxygen Concentration (%):  [35-80] 35  Resp Rate Total:  [22 br/min-39 br/min] 22 br/min  Vt Set:  [400 mL] 400 mL  PEEP/CPAP:  [5.1 cmH20-6.6 cmH20] 6.2 cmH20  Pressure Support:  [5 cmH20] 5 cmH20  Mean Airway Pressure:  [7.9 uwY60-79 cmH20] 12 cmH20           LAST ARTERIAL BLOOD GAS  ABG  Recent Labs   Lab 06/25/24  0519   PH 7.461*   PO2 93   PCO2 32.9*   HCO3 23.4*   BE 0       ASSESSMENT:   Cardiac arrest  Hyperkalemia   Hx of vascular disease   UTI  NICOLE and probable OHS   Chronic hypercapneic respiratory failure   7.   Ventricular tachycardia     Still not clear on etiology of arrest but may be a NSTEMI precipitated by dialysis.  ECHO has evidence of reduced EF.     PLAN:   - Continue broad spectrum antibiotics  - Agree with CTH  - Need SAT/SBT tomorrow  - Will plan on transitioning to precedex if required to help with extubation   - If neuro exam remains poor, we may require further neuro prognostication and neurology consultation   - Off vasopressors today  - Appreciate nephrology and cardiology consultants assistance     Seymour Nicole MD  Date of Service: 06/25/2024  5:28 PM    Upon my evaluation, this patient had a high probability of imminent or life-threatening deterioration, which required my direct attention, intervention, and personal management.    I have personally provided at least 35 minutes of critical care time exclusive of time spent on separately billable procedures. Over 50% of the time of this encounter was spent in direct care at the bedside. Time includes review of laboratory data, radiology results, discussion with consultants, and monitoring for potential decompensation. Interventions were performed as documented above.

## 2024-06-25 NOTE — ASSESSMENT & PLAN NOTE
Chronic stable issue  Continue carvedilol, Holding home apixaban for now  No oozing from HD tunnel catheter

## 2024-06-25 NOTE — NURSING
1642  105 min into HD treatment, pt become lethargic, sternal rub done and NS bolus given, unable to find pulse, code called and compressions started  Patient was rinsed back and disconnected from hd machine   Code team arrived within 60 seconds and took over patient care   Uf net removed 750ml   No issues with catheter, however, catheter site bleeding , pressure dressing still on site from patient arrival to HD room.

## 2024-06-25 NOTE — CONSULTS
"[facility]  Department of Cardiology  Consult Note      PATIENT NAME: Juhi Taylor  MRN: [id number]  TODAY'S DATE: 06/25/2024  ADMIT DATE: 6/18/2024                          CONSULT REQUESTED BY: Manuel Christensen MD    SUBJECTIVE     PRINCIPAL PROBLEM: Chronic kidney disease (CKD), stage V      REASON FOR CONSULT:  cardiac arrest, bradycardia, elevated troponin      HPI:  Patient was 74-year-old female who has been hospitalized since 06/18/2024.  She was status post cardiac arrest, PEA during dialysis yesterday evening.  She is intubated and unable to provide any history.  Strips reviewed from time event showed brief run of V-tach    "Code blue was called on June 24th evening, she was unresponsive. Chest compressions were already underway. Rhythm was consistent with PE a arrest initially, after giving epinephrine, the rhythm appeared to change to ventricular tachycardia. We cardioverted and patient achieved ROSC.  Additionallly she received bicarbonate and glucose. "      Review of patient's allergies indicates:   Allergen Reactions    Percocet [oxycodone-acetaminophen] Itching    Amiodarone analogues      Itching      Irbesartan Swelling    Januvia [sitagliptin]     Jardiance [empagliflozin]      Leg cramps    Lipitor [atorvastatin] Other (See Comments)     Severe leg pain    Linaclotide Other (See Comments) and Nausea And Vomiting     Does not remember    Lubiprostone Other (See Comments) and Palpitations     Does not remember       Past Medical History:   Diagnosis Date    Anticoagulant long-term use     Arthritis     Breast cancer 2014    invasive lobular carcinoma    Cancer of kidney 11/2020    RIGHT KIDNEY CANCER    CHF (congestive heart failure)     Coronary artery disease dx 2005    Depression     Diabetes mellitus     Diastolic heart failure secondary to hypertension     Gout     Hyperlipemia     Hypertension     Hypertrophy of nasal turbinates     Kidney mass 2020    Right    " "Levoscoliosis     Lung nodule     left    Multiple thyroid nodules     NICOLE (obstructive sleep apnea)     uses C-PAP    Pulmonary hypertension      Past Surgical History:   Procedure Laterality Date    AORTOGRAPHY N/A 12/04/2020    Procedure: Aortogram;  Surgeon: Paul Pedersen MD;  Location: [facility] CATH;  Service: Cardiology;  Laterality: N/A;    BREAST SURGERY      CARDIAC CATHETERIZATION  12/2020    CHOLECYSTECTOMY      COLONOSCOPY      multi -last 2014     CORONARY ARTERY BYPASS GRAFT      ESOPHAGOGASTRODUODENOSCOPY      2012     HAND SURGERY Right     INSERTION OF CATHETER N/A 6/23/2024    Procedure: Insertion,catheter;  Surgeon: Meli Griffin MD;  Location: [facility] OR;  Service: Vascular;  Laterality: N/A;  ORIGINAL CATHETER WAS REPOSITIONED.  NO NEW CATHETER WAS PLACED    INSERTION, CATHETER, DIALYSIS, PERITONEAL N/A 6/4/2024    Procedure: IN Insertion Catheter, Dialysis, Peritoneal - laparoscopic;  Surgeon: Rodriguez Catalan Jr., MD;  Location: [facility] OR;  Service: General;  Laterality: N/A;    LAPAROSCOPIC ROBOT-ASSISTED SURGICAL REMOVAL OF KIDNEY USING DA CHELLE XI Right 03/10/2022    Procedure: XI ROBOTIC NEPHRECTOMY- radical;  Surgeon: Rolando Ramirez MD;  Location: [facility] OR;  Service: Urology;  Laterality: Right;    MASTECTOMY W/ SENTINEL NODE BIOPSY Bilateral 01/21/2015    bilateral "dog ears"    NASAL SINUS SURGERY  2015    Dr Bryant FESS/cauterization turbinate     PARTIAL HYSTERECTOMY  1989    PERCUTANEOUS TRANSLUMINAL BALLOON ANGIOPLASTY OF CORONARY ARTERY  12/04/2020    Procedure: Angioplasty-coronary;  Surgeon: Paul Pedersen MD;  Location: [facility] CATH;  Service: Cardiology;;    RENAL BIOPSY Right     9/20/2021 EJ    TUBAL LIGATION      ULTRASOUND GUIDANCE  12/04/2020    Procedure: Ultrasound Guidance;  Surgeon: Paul Pedersen MD;  Location: [facility] CATH;  Service: Cardiology;;     Social History     Tobacco Use    Smoking status: Former     Current packs/day: 0.00     Types: Cigarettes     Start " date: 2016     Quit date: 2016     Years since quittin.4    Smokeless tobacco: Never    Tobacco comments:     quit    Substance Use Topics    Alcohol use: No    Drug use: No        REVIEW OF SYSTEMS  Negative except as mentioned in HPI    OBJECTIVE     VITAL SIGNS (Most Recent)  Temp: 97.4 °F (36.3 °C) (24 0301)  Pulse: (!) 52 (24 1400)  Resp: (!) 22 (24 1400)  BP: 139/75 (24 1400)  SpO2: 97 % (24 1400)    VENTILATION STATUS  Resp: (!) 22 (24 1400)  SpO2: 97 % (24 1400)  Vent Mode: A/C  Oxygen Concentration (%):  [] 35  Resp Rate Total:  [22 br/min-39 br/min] 22 br/min  Vt Set:  [400 mL-500 mL] 400 mL  PEEP/CPAP:  [5 cmH20-6.6 cmH20] 6.4 cmH20  Mean Airway Pressure:  [12 dhD14-02 cmH20] 12 cmH20        I & O (Last 24H):  Intake/Output Summary (Last 24 hours) at 2024 1429  Last data filed at 2024 0601  Gross per 24 hour   Intake 1637.84 ml   Output 1600 ml   Net 37.84 ml       WEIGHTS  Wt Readings from Last 3 Encounters:   24 0443 (!) 147.9 kg (326 lb 1 oz)   24 1927 (!) 147.3 kg (324 lb 11.8 oz)   24 1202 132.5 kg (292 lb)   24 1009 (!) 147.9 kg (326 lb)   24 0910 132.5 kg (292 lb 1.8 oz)       PHYSICAL EXAM    GENERAL:  Morbidly obese female resting on ventilator undergoing dialysis   HEENT: Normocephalic.  ETT and OG tube in place  NECK: No JVD.   CARDIAC: Regular bradycardic rate and rhythm.  CHEST ANATOMY:  Left chest wall dialysis catheter oozing blood  LUNGS:  Scattered rhonchi, intubated  ABDOMEN: Soft, obese hypoactive bowel sounds.    EXTREMITIES: 2-3+ pitting edema lower  CENTRAL NERVOUS SYSTEM:  Intubated and sedated  Damon cath in place draining small amount of yellow urine  Tele: Sinus bradycardia w/ PVCs      HOME MEDICATIONS:  No current facility-administered medications on file prior to encounter.     Current Outpatient Medications on File Prior to Encounter   Medication Sig Dispense Refill     allopurinoL (ZYLOPRIM) 300 MG tablet Take 1 tablet (300 mg total) by mouth once daily. 90 tablet 3    amLODIPine (NORVASC) 10 MG tablet Take 10 mg by mouth once daily.      calcitRIOL (ROCALTROL) 0.5 MCG Cap Take 0.5 mcg by mouth once daily.      carvediloL (COREG) 25 MG tablet Take 1 tablet (25 mg total) by mouth 2 (two) times daily. 180 tablet 2    cyanocobalamin (VITAMIN B-12) 100 MCG tablet Take 100 mcg by mouth once daily.      evolocumab (REPATHA SURECLICK) 140 mg/mL PnIj Inject 1 mL (140 mg total) into the skin every 14 (fourteen) days. 2 mL 11    furosemide (LASIX) 40 MG tablet Take 40 mg by mouth once daily.      loratadine (CLARITIN) 10 mg tablet Take 10 mg by mouth once daily.      magnesium oxide (MAG-OX) 400 mg (241.3 mg magnesium) tablet Take 1 tablet (400 mg total) by mouth daily as needed (cramping). 30 tablet 0    blood sugar diagnostic (TRUE METRIX GLUCOSE TEST STRIP) Strp Use 1x daily. Insurance preferred. 100 strip 3    blood sugar diagnostic Strp To check BG 1 time daily, to use with insurance preferred meter 100 strip 3    cloNIDine (CATAPRES) 0.1 MG tablet Take 1 tablet (0.1 mg total) by mouth 3 (three) times daily as needed (PRN SBP > 165 mmHg). 90 tablet 6    cloNIDine 0.1 mg/24 hr td ptwk (CATAPRES) 0.1 mg/24 hr Place 1 patch onto the skin every 7 days. 4 patch 4    lancets Misc To check BG 1 times daily, to use with insurance preferred meter 100 each 3    nitroGLYCERIN (NITROSTAT) 0.4 MG SL tablet Place 1 tablet (0.4 mg total) under the tongue every 5 (five) minutes as needed for Chest pain. 25 tablet 0    [DISCONTINUED] aspirin (ECOTRIN) 81 MG EC tablet Take 1 tablet (81 mg total) by mouth once daily. 90 tablet 3    [DISCONTINUED] DULoxetine (CYMBALTA) 20 MG capsule TAKE ONE CAPSULE BY MOUTH ONCE DAILY 30 capsule 4    [DISCONTINUED] folic acid (FOLVITE) 1 MG tablet Take 1 mg by mouth once daily.      [DISCONTINUED] metFORMIN (GLUCOPHAGE-XR) 500 MG ER 24hr tablet Take 2 tablets (1,000  mg total) by mouth 2 (two) times daily with meals. 360 tablet 3    [DISCONTINUED] sodium bicarbonate 650 MG tablet Take 1 tablet (650 mg total) by mouth once daily. 30 tablet 11       SCHEDULED MEDS:   cefTRIAXone (Rocephin) IV (PEDS and ADULTS)  1 g Intravenous Q24H    epoetin alaina (PROCRIT) injection  50 Units/kg Intravenous Every Mon, Wed, Fri    famotidine  20 mg Oral Daily       CONTINUOUS INFUSIONS:   amiodarone in dextrose 5%  1 mg/min Intravenous Continuous   Stopped at 06/24/24 2010    NORepinephrine bitartrate-D5W  0-3 mcg/kg/min Intravenous Continuous   Stopped at 06/24/24 2215    propofoL  0-50 mcg/kg/min Intravenous Continuous 22.2 mL/hr at 06/25/24 1420 25 mcg/kg/min at 06/25/24 1420       PRN MEDS:  Current Facility-Administered Medications:     acetaminophen, 650 mg, Oral, Q8H PRN    aluminum-magnesium hydroxide-simethicone, 30 mL, Oral, QID PRN    benzonatate, 100 mg, Oral, TID PRN    dextrose 50%, 12.5 g, Intravenous, PRN    dextrose 50%, 25 g, Intravenous, PRN    glucagon (human recombinant), 1 mg, Intramuscular, PRN    glucose, 16 g, Oral, PRN    glucose, 24 g, Oral, PRN    HYDROcodone-acetaminophen, 1 tablet, Oral, Q6H PRN    insulin aspart U-100, 0-10 Units, Subcutaneous, QID (AC + HS) PRN    melatonin, 6 mg, Oral, Nightly PRN    ondansetron, 4 mg, Intravenous, Q6H PRN    ondansetron, 4 mg, Intravenous, Daily PRN    Pharmacy to dose Vancomycin consult, , , Once **AND** vancomycin - pharmacy to dose, , Intravenous, pharmacy to manage frequency    LABS AND DIAGNOSTICS     CBC LAST 3 DAYS  Recent Labs   Lab 06/24/24  0543 06/24/24  1836 06/24/24 2016 06/25/24  0321 06/25/24  0519   WBC 8.76  --  11.11 8.35  --    RBC 4.75  --  4.37 4.21  --    HGB 11.8*  --  11.0* 10.5*  --    HCT 41.6   < > 37.5 34.1* 41   MCV 88  --  86 81*  --    MCH 24.8*  --  25.2* 24.9*  --    MCHC 28.4*  --  29.3* 30.8*  --    RDW 19.2*  --  18.5* 18.3*  --      --  170 153  --    MPV 9.7  --  9.9 10.2  --    GRAN  65.1  5.7  --  75.0*  8.4* 61.8  5.2  --    LYMPH 21.9  1.9  --  13.7*  1.5 24.4  2.0  --    MONO 10.2  0.9  --  9.4  1.0 10.3  0.9  --    BASO 0.02  --  0.02 0.02  --    NRBC 1*  --  1* 1*  --     < > = values in this interval not displayed.       COAGULATION LAST 3 DAYS  Recent Labs   Lab 06/24/24 2016   INR 1.1       CHEMISTRY LAST 3 DAYS  Recent Labs   Lab 06/22/24  0526 06/23/24  0442 06/24/24  0543 06/24/24  1836 06/24/24 2016 06/25/24  0321 06/25/24  0519    137 137  --  136 136  --    K 4.4 5.3* 5.4*  --  4.1 3.9  --     106 107  --  103 103  --    CO2 27 22* 22*  --  22* 23  --    ANIONGAP 9 9 8  --  11 10  --    * 90* 107*  --  85* 91*  --    CREATININE 5.1* 4.6* 5.5*  --  4.8* 5.1*  --    * 124* 124*  --  211* 104  --    CALCIUM 9.3 9.1 9.0  --  8.6* 8.4*  --    PH  --   --   --  7.373  --   --  7.461*   MG 2.7* 2.4 2.6  --   --  2.4  --    ALBUMIN 3.2* 3.1*  --   --  2.8*  --   --    PROT 6.1 5.8*  --   --  5.3*  --   --    ALKPHOS 84 85  --   --  84  --   --    ALT 13 14  --   --  11  --   --    AST 11 16  --   --  19  --   --    BILITOT 0.6 0.7  --   --  0.7  --   --        CARDIAC PROFILE LAST 3 DAYS  Recent Labs   Lab 06/20/24  0402   BNP 1,726*       ENDOCRINE LAST 3 DAYS  Recent Labs   Lab 06/19/24  1625   PROCAL 0.238       LAST ARTERIAL BLOOD GAS  ABG  Recent Labs   Lab 06/25/24  0519   PH 7.461*   PO2 93   PCO2 32.9*   HCO3 23.4*   BE 0       LAST 7 DAYS MICROBIOLOGY   Microbiology Results (last 7 days)       Procedure Component Value Units Date/Time    Culture, Respiratory with Gram Stain [[id number]] Collected: 06/24/24 1848    Order Status: Completed Specimen: Respiratory from Sputum, Expectorated Updated: 06/25/24 0721     Respiratory Culture No Growth     Gram Stain (Respiratory) <10 epithelial cells per low power field.     Gram Stain (Respiratory) Few WBC's     Gram Stain (Respiratory) No organisms seen    Blood culture [[id number]] Collected: 06/24/24  2016    Order Status: Completed Specimen: Blood from Peripheral, Hand, Right Updated: 06/25/24 0317     Blood Culture, Routine No Growth to date            MOST RECENT IMAGING  X-Ray Chest AP Portable  Narrative: EXAMINATION:  XR CHEST AP PORTABLE    CLINICAL HISTORY:  intubated;    FINDINGS:  Portable chest at 503 compared with 06/24/2024 shows unchanged support tubes and lines and cardiomediastinal silhouette.  Transcutaneous pacing pads unchanged.  Distal aspect of enteric catheter is difficult to visualize with certainty on this exam.    No new confluent alveolar consolidation, pleural effusion, or pneumothorax.  Central pulmonary vasculature unchanged.  No acute osseous abnormality.  Impression: No significant change.    Electronically signed by: Adalberto Horan  Date:    06/25/2024  Time:    07:50      ECHOCARDIOGRAM RESULTS (last 5)  Results for orders placed during the hospital encounter of 06/18/24    Echo    Interpretation Summary    Left Ventricle: The left ventricle is moderately dilated. Moderately increased wall thickness. There is moderate concentric hypertrophy. Moderate global hypokinesis present. There is mildly reduced systolic function with a visually estimated ejection fraction of 40 - 45%. Unable to assess diastolic function due to atrial fibrillation.    Right Ventricle: Normal right ventricular cavity size. Wall thickness is normal. Systolic function is borderline low.    Left Atrium: Left atrium is mildly dilated.    Right Atrium: Right atrium is mildly dilated.    Mitral Valve: There is mild regurgitation with a centrally directed jet.    Tricuspid Valve: There is mild regurgitation with a centrally directed jet.    Pulmonary Artery: There is moderate to severe pulmonary hypertension. The estimated pulmonary artery systolic pressure is 56 mmHg.    IVC/SVC: Elevated venous pressure at 15 mmHg.      Results for orders placed during the hospital encounter of 08/20/23    Echo    Interpretation  Summary    Left Ventricle: The left ventricle is normal in size. Increased ventricular mass. Moderately increased wall thickness. Mild global hypokinesis present. There is mildly reduced systolic function with a visually estimated ejection fraction of 40 - 50%.  EF ~ 45%% Grade I diastolic dysfunction. Elevated left ventricular filling pressure. Tissue Doppler velocity is reduced.    Right Ventricle: Normal right ventricular cavity size. Systolic function is normal.    Mitral Valve: There is no stenosis. The mean pressure gradient across the mitral valve is 3 mmHg at a heart rate of  bpm.    IVC/SVC: Normal venous pressure at 3 mmHg.    Left ventricle hypertrophy with depressed ejection fraction and elevation of mean left atrial pressure      Results for orders placed during the hospital encounter of 11/19/21    Echo    Interpretation Summary  · The estimated ejection fraction is 35-40%.  · The left ventricle is moderately enlarged with moderate concentric hypertrophy  · Severe left atrial enlargement.  · Normal right ventricular size with normal right ventricular systolic function.  · Mild-to-moderate mitral regurgitation.  · Mild tricuspid regurgitation.  · The estimated PA systolic pressure is 44 mmHg.  · There is pulmonary hypertension.      Results for orders placed during the hospital encounter of 11/30/20    Echo Color Flow Doppler? Yes    Interpretation Summary  · There is left ventricular concentric hypertrophy.  · With low normal systolic function. The estimated ejection fraction is 50%.  · Grade II diastolic dysfunction.  · With normal right ventricular systolic function.  · Severe left atrial enlargement.  · Mild right atrial enlargement.  · Mild mitral regurgitation.  · Mild to moderate tricuspid regurgitation.  · Normal central venous pressure (3 mmHg).  · The estimated PA systolic pressure is 51 mmHg.  · There is pulmonary hypertension.      Results for orders placed in visit on  07/06/18    Transthoracic echo (TTE) complete    Interpretation Summary  · Left ventricle ejection fraction is mildly decreased at 45%  · Grade I (mild) left ventricular diastolic dysfunction consistent with impaired relaxation.  · Mitral valve shows mild regurgitation.      CURRENT/PREVIOUS VISIT EKG  Results for orders placed or performed during the hospital encounter of 06/18/24   EKG 12-lead    Collection Time: 06/25/24 12:30 AM   Result Value Ref Range    QRS Duration 146 ms    OHS QTC Calculation 467 ms    Narrative    Test Reason : R07.9,    Vent. Rate : 045 BPM     Atrial Rate : 045 BPM     P-R Int : 182 ms          QRS Dur : 146 ms      QT Int : 540 ms       P-R-T Axes : -20 -27 234 degrees     QTc Int : 467 ms    Sinus bradycardia with occasional Premature ventricular complexes  LVH with QRS widening and repolarization abnormality  Abnormal ECG  When compared with ECG of 24-JUN-2024 17:58,  Left bundle branch block is no longer Present    Referred By: [email or web address]   SELF           Confirmed By:            ASSESSMENT/PLAN:     Active Hospital Problems    Diagnosis    *Chronic kidney disease (CKD), stage V    Cardiac arrest    CHF (congestive heart failure)    Acute hypoxic respiratory failure    Morbid obesity    Renal cell carcinoma of right kidney    Paroxysmal atrial fibrillation    Type 2 diabetes mellitus with hyperglycemia    Coronary artery disease    Essential hypertension       ASSESSMENT & PLAN:   Cardiac arrest  Ventricular tachycardia  Left bundle branch block  Cardiomyopathy  Acute hypoxic respiratory failure  CAD  H/O PCI x3 in 2020  Hypertension  Congestive heart failure  Paroxysmal atrial fibrillation  Diabetes mellitus 2  CKD stage 5/ESRD  Morbid Obesity  Renal cell carcinoma of right kidney -right nephrectomy    RECOMMENDATIONS:  -Patient is status post PEA/V-tach arrest on 06/24/2024  -Echocardiogram showed drop in EF to 20-25% (reduced EF since 2021)  -Left bundle-branch block noted on  EKG.  -Pt will need ICD implanted once recovers from pulmonary standpoint  -Continue IV amiodarone drip per protocol  -Closely monitor electrolytes.  Patient had hyperkalemia yesterday a.m., 5.4. Today K+ level is 3.9.  Magnesium level is 2.4 today  -Obtain serial troponin levels until downward trend noted  -Wean off pressors has tolerated      Ramila Lucia NP  [facility]  Department of Cardiology  Date of Service: 06/25/2024        I have personally interviewed and examined the patient, I have reviewed the Nurse Practitioner's history and physical, assessment, and plan. I agree with the findings and plan.    1. Patient has known history of CAD and has had 3 stent placements done about 4 years ago might need repeat coronary angiogram.  2. Patient is a candidate for ICD implantation.  She needs to be transferred to the Main [place] for ICD implantation.  3. Currently on amiodarone continue the same.  Follow the protocol.  4. Discussed with patient's daughter at length and in detail and also reviewed the echocardiogram results with the family.      Dr. Dennis Trejo M.D.  [facility]  Department of Cardiology  Date of Service: 06/25/2024  2:29 PM

## 2024-06-25 NOTE — ASSESSMENT & PLAN NOTE
Rhythm PEA. No bradycardia   Possible cardiac cause . Consult cardiology   Cannot give blood thinners. Oozing from tunnel catheter   May need PE study when patient more stable   Cardiology consulted

## 2024-06-25 NOTE — ASSESSMENT & PLAN NOTE
EF 40-45% with elevated BNP  S/p tunnel line and nephrology will do HD  Nephrology is following

## 2024-06-25 NOTE — CARE UPDATE
06/25/24 0749   Patient Assessment/Suction   Level of Consciousness (AVPU) responds to voice   Respiratory Effort Normal;Unlabored   Expansion/Accessory Muscles/Retractions no use of accessory muscles   All Lung Fields Breath Sounds Anterior:;Lateral:   MALORIE Breath Sounds coarse   LLL Breath Sounds diminished   RUL Breath Sounds coarse   RML Breath Sounds coarse   RLL Breath Sounds diminished   Rhythm/Pattern, Respiratory assisted mechanically   Cough Frequency with stimulation   Suction Pressure (mmHg) -120 mmHg   $ Suction Charges Inline Suction Procedure Stat Charge   Secretions Amount moderate   Secretions Color white   Secretions Characteristics thick   PRE-TX-O2   Device (Oxygen Therapy) ventilator   $ Is the patient on High Flow Oxygen? Yes   Oxygen Concentration (%) 35   SpO2 99 %   Pulse Oximetry Type Continuous   $ Pulse Oximetry - Multiple Charge Pulse Oximetry - Multiple   Probe Placed On (Pulse Ox) Left:;ear   Pulse (!) 44   Positioning HOB elevated 30 degrees        Airway - Non-Surgical 06/24/24 1700 Endotracheal Tube   Placement Date/Time: 06/24/24 1700   Method of Intubation: Video Laryngoscopy  Inserted by: MD  Airway Device: Endotracheal Tube  Airway Device Size: 7.5  Style: Cuffed  Cuffed: Cuffed  Cuff Inflation: Minimal occlusive pressure  Cuff Inflated With: A...   Secured at 23 cm   Measured At Lips   Secured Location Center   Secured by Commercial tube quezada   Status Intact;Secured;Patent   Airway Safety   Is Ambu Bag and Mask with Patient? Yes, Adult Ambu Bag and Mask   Suction set is at the bedside? Yes   Respiratory Interventions   Airway/Ventilation Management airway patency maintained;calming measures promoted;humidification applied;pulmonary hygiene promoted   Airway/Ventilation Support humidification applied;pulmonary hygiene promoted;dyspnea relief promoted;cough relief provided   Vent Select   Conventional Vent Y   $ Ventilator Subsequent 1   Charged w/in last 24h YES   Preset  Conventional Ventilator Settings   Vent ID [id number]   Vent Type    Ventilation Type VC   Vent Mode A/C   Humidity HME   Set Rate 22 BPM   Vt Set 400 mL   PEEP/CPAP 6.4 cmH20   Waveform RAMP   Peak Flow 60 L/min   Trigger Sensitivity Flow/I-Trigger 3 L/min   Patient Ventilator Parameters   Resp Rate Total 22 br/min   Peak Airway Pressure 30 cmH20   Mean Airway Pressure 12 cmH20   Exhaled Vt 402 mL   Total Ve 8.82 L/m   I:E Ratio Measured 1:2.8   Conventional Ventilator Alarms   Alarms On Y   Ve High Alarm 25 L/min   Ve Low Alarm 5 L/min   Resp Rate High Alarm 40 br/min   Apnea Rate 14   Apnea Volume (mL) 0 mL   Apnea Oxygen Concentration  100   Apnea Flow Rate (L/min) 60   T Apnea 20 sec(s)   IHI Ventilator Associated Pneumonia Bundle (Required)   Daily Awakening Trials Performed Not applicable   Head of Bed Elevated  HOB 30   Oral Care Teeth brushed;Denture care;Mouth swabbed;Mouth moisturizer;Mouth suctioned   Vent Circut Breaks Minimized Yes   Ready to Wean/Extubation Screen   FIO2<=50 (chart decimal) 0.35   MV<16L (chart vol.) 8.82   PEEP <=8 (chart #) 6.4

## 2024-06-25 NOTE — RESPIRATORY THERAPY
06/24/24 1905   Patient Assessment/Suction   Level of Consciousness (AVPU) responds to pain   Respiratory Effort Normal;Unlabored   Expansion/Accessory Muscles/Retractions no retractions;no use of accessory muscles   All Lung Fields Breath Sounds coarse   Rhythm/Pattern, Respiratory assisted mechanically   Cough Frequency with stimulation   Cough Type assisted   Suction Method tracheal   Suction Pressure (mmHg) -120 mmHg   $ Suction Charges Inline Suction Procedure Stat Charge   Secretions Amount moderate   Secretions Color yellow   Secretions Characteristics thick   Sputum Collection sample obtained per suctioning   $ Swab or suction? Suction   Aspirate Toleration JEANNE (no adverse reactions)   Is this patient in SNF or Inpatient Rehab? No   Skin Integrity   $ Wound Care Tech Time 15 min   Area Observed Upper lip;Lower lip;Corner lip   Skin Appearance without discoloration   PRE-TX-O2   Device (Oxygen Therapy) ventilator   Oxygen Concentration (%) 40   SpO2 97 %   Pulse Oximetry Type Continuous   $ Pulse Oximetry - Multiple Charge Pulse Oximetry - Multiple   Pulse (!) 49   Resp (!) 21        Airway - Non-Surgical 06/24/24 1700 Endotracheal Tube   Placement Date/Time: 06/24/24 1700   Method of Intubation: Video Laryngoscopy  Inserted by: MD  Airway Device: Endotracheal Tube  Airway Device Size: 7.5  Style: Cuffed  Cuffed: Cuffed  Cuff Inflation: Minimal occlusive pressure  Cuff Inflated With: A...   Secured at 23 cm   Measured At Lips   Secured Location Right   Secured by Commercial tube quezada   Status Intact;Secured;Patent   Airway Safety   Is Ambu Bag and Mask with Patient? Yes, Adult Ambu Bag and Mask   Suction set is at the bedside? Yes   Respiratory Interventions   Airway/Ventilation Management airway patency maintained   Vent Select   Conventional Vent Y   Charged w/in last 24h YES   Preset Conventional Ventilator Settings   Vent ID [id number]   Vent Type    Ventilation Type VC   Vent Mode A/C   Humidity HME    Set Rate 26 BPM   Vt Set 400 mL   PEEP/CPAP 6.5 cmH20   Waveform RAMP   Peak Flow 60 L/min   Trigger Sensitivity Flow/I-Trigger 3 L/min   Patient Ventilator Parameters   Resp Rate Total 26 br/min   Peak Airway Pressure 33 cmH20   Mean Airway Pressure 14 cmH20   Exhaled Vt 407 mL   Total Ve 10.5 L/m   I:E Ratio Measured 1:2.2   Conventional Ventilator Alarms   Alarms On Y   Ve High Alarm 25 L/min   Ve Low Alarm 5 L/min   Resp Rate High Alarm 40 br/min   Apnea Rate 14   Apnea Volume (mL) 0 mL   Apnea Oxygen Concentration  100   Apnea Flow Rate (L/min) 60   T Apnea 20 sec(s)   Ready to Wean/Extubation Screen   FIO2<=50 (chart decimal) 0.4   MV<16L (chart vol.) 10.5   PEEP <=8 (chart #) 6.5   Ready to Wean Parameters   F/VT Ratio<105 (RSBI) (!) 51.6   Education   $ Education Suction;Ventilator Oxygen;15 min

## 2024-06-25 NOTE — NURSING
PT consistently bradycardic throughout night. MD notified throughout the night. Did repeat EKG, can see in chart. No orders for bradycardia, just monitor and notify if HR maintains <40. Attempted SAT, pt minimal response. Did not follow commands, biting ET tube.   MD notified of swelling in tongue and lip. No new interventions.  Report given to Maureen. She has assumed care at 0645    Nurses Note -- 4 Eyes      06/24/2024   9:51 PM      Skin assessed during: Q Shift Change      [x] No Altered Skin Integrity Present    []Prevention Measures Documented      [] Yes- Altered Skin Integrity Present or Discovered   [] LDA Added if Not in Epic (Describe Wound)   [] New Altered Skin Integrity was Present on Admit and Documented in LDA   [] Wound Image Taken    Wound Care Consulted? No    Attending Nurse:  Sheila Strickland RN/Staff Member:  NEENA Hallman

## 2024-06-25 NOTE — SUBJECTIVE & OBJECTIVE
Interval History:     Patient was seen and examined along with the team  Patient had cardiac arrest PE yesterday and got ROSC  Currently slightly bradycardic, EKG did not show heart block  Echocardiogram with low ejection fraction 45%.  Patient is getting oozing from the left dialysis line.  Hemoglobin stable and continued to be on the vent.  Cardiology consulted for positive troponins.  Still unsure cause of the PEA arrest    Review of Systems  Objective:     Vital Signs (Most Recent):  Temp: 97.4 °F (36.3 °C) (06/25/24 0301)  Pulse: (!) 45 (06/25/24 0900)  Resp: (!) 26 (06/25/24 0345)  BP: (!) 147/75 (06/25/24 0900)  SpO2: 100 % (06/25/24 0900) Vital Signs (24h Range):  Temp:  [97.4 °F (36.3 °C)-98.6 °F (37 °C)] 97.4 °F (36.3 °C)  Pulse:  [] 45  Resp:  [10-27] 26  SpO2:  [95 %-100 %] 100 %  BP: ()/(50-83) 147/75     Weight: (!) 147.9 kg (326 lb 1 oz)  Body mass index is 51.07 kg/m².    Intake/Output Summary (Last 24 hours) at 6/25/2024 1309  Last data filed at 6/25/2024 0601  Gross per 24 hour   Intake 1637.84 ml   Output 1600 ml   Net 37.84 ml         Physical Exam  Vitals and nursing note reviewed.   Constitutional:       Comments: sedated   HENT:      Head: Normocephalic and atraumatic.   Eyes:      Conjunctiva/sclera: Conjunctivae normal.   Neck:      Vascular: No JVD.   Cardiovascular:      Heart sounds: Normal heart sounds.   Pulmonary:      Effort: Pulmonary effort is normal.      Comments: Decreased bilateral  Abdominal:      General: Bowel sounds are normal.      Palpations: Abdomen is soft.   Skin:     General: Skin is warm.   Neurological:      Comments: intubated             Significant Labs: All pertinent labs within the past 24 hours have been reviewed.  CBC:   Recent Labs   Lab 06/24/24  0543 06/24/24  1836 06/24/24  2016 06/25/24  0321 06/25/24  0519   WBC 8.76  --  11.11 8.35  --    HGB 11.8*  --  11.0* 10.5*  --    HCT 41.6   < > 37.5 34.1* 41     --  170 153  --     < > =  "values in this interval not displayed.     CMP:   Recent Labs   Lab 06/24/24  0543 06/24/24  2016 06/25/24  0321    136 136   K 5.4* 4.1 3.9    103 103   CO2 22* 22* 23   * 211* 104   * 85* 91*   CREATININE 5.5* 4.8* 5.1*   CALCIUM 9.0 8.6* 8.4*   PROT  --  5.3*  --    ALBUMIN  --  2.8*  --    BILITOT  --  0.7  --    ALKPHOS  --  84  --    AST  --  19  --    ALT  --  11  --    ANIONGAP 8 11 10     Cardiac Markers: No results for input(s): "CKMB", "MYOGLOBIN", "BNP", "TROPISTAT" in the last 48 hours.    Significant Imaging: I have reviewed all pertinent imaging results/findings within the past 24 hours.  "

## 2024-06-25 NOTE — PT/OT/SLP PROGRESS
Occupational Therapy      Patient Name:  Juhi Taylor   MRN:  [id number]    Patient not seen today secondary to Other (Comment) (Pt with change in status and transferred to ICU on ventilator. Will need new orders when pt is off vent and medically stable for therapy.).    6/25/2024

## 2024-06-25 NOTE — NURSING
Changed dressing to left subclavian hemisplit per sterile technique, pt beckie well.  Very large amont of blood leaking from site.  MD aware.  Changed pts gown.  Gauze, and ABD pad applied to site.  Family remains at bedside.

## 2024-06-25 NOTE — PROGRESS NOTES
Nephrology Progress Note        Patient Name: Juhi Taylor  MRN: [id number]    Patient Class: IP- Inpatient   Admission Date: 6/18/2024  Length of Stay: 6 days  Date of Service: 6/25/2024    Attending Physician: Manuel Christensen MD  Primary Care Provider: Tony Sadler MD    Reason for Consult: josue    SUBJECTIVE:     HPI: 73F fallowed with Dr. Martinez from nephrology for advanced CKD stage 4-5 getting admitted with fluid overload. Pt had PD catheter insertion 2 weeks ago in preparation for initiation of dialysis, however had post-operative issues with increased leakage. Then her PD in initiation and training was delayed due to insurance issues. She has oliguria/increased weight gain in past 2 weeks, SOB on exertion and cough. Symptoms got worse and she came to ER and got admitted.     I saw her in the ER and recommend trial of lasix gtt, cassia. Will consult Dr. Catalan to evaluate patient and make recommendation for catheter care - we probably will not be able to use it for now for dialysis.    6/19 VSS. Only 175cc UO documented, but may be inaccurately charted - pt boarding in ER. Will increase lasix gtt to 10mg/hr and ensure at least q shift UO documentation.    6/20 VSS. BP low, stop Clonidine patch. Increase lasix gtt to 15mg/hr, add metolazone. Stop Fluconazole in 1-2 days after stopping IV abx for suspected PNA. Appreciate Surgery input - will d/w patient initiation of HD tomorrow if unable to produce much UO and symptomatic improvement...    6/21 VSS. Will discuss initiation of dialysis via HD, she is not responding to diuretics.    6/22 VSS. Plan for HD today after dialysis access placement. Seen on HD, BF around 275 cc/min max, otherwise tolerating well. Will have to be repositioned in cath lab by Dr. Griffin on Monday.    6/23 VSS. Getting HD cath repositioned today, plan HD tomorrow.    6/24 VSS, on 2-3L NC, first HD 6/22, second HD today    6/25 105 min into HD treatment, patient  became lethargic acutely (was conversing/calm)- gave NS bolus- went into PEA arrest- had ACLS with ROSC- net UF 750cc, intubated and transferred to ICU, bradycardic, SBP 110s, FIO2 35%, UOP 100cc, required amio and levophed but now off- mld rise in trop noted, lactic acid normal, CXR clear, echo pending    ASSESSMENT/PLAN:     PEA arrest during dialysis 6/24  - unclear etiology- sometimes we see dialysis disequilibrium but we weren't running her that fast on BFR- dialysis in itself can cause myocardial stunning so have to presume a cardiac event- trop leak noted- f/u echo- oxygenating ok- low suspicion for acute PE    CKD stage 5- uremia, hyperkalemia, oliguria  PD cathether in place  Initiated on RRT via IHD this admission  Volume overload/CHF exacerbation  - 3rd HD today for clearance and UF- may need daily treatments this week- very edematous- use albumin PRN  - update family at length about how we are going to do 3rd treatment today in a gentle way and see how she tolerates it  - renal diet, 1.5L fluid restriction  - will need outpatient HD unit placement- plan for discharge to [facility] in interim    Anemia of CKD  - continue PONCE with HD    MBD / Secondary HPT  - phos is elevated- do dialysis- renal diet- may need phos binder  - last PTH acceptable    HTN  - UF with HD    Updated family at bedside    Thank you for allowing us to participate in the care of your patient!   We will follow the patient and provide recommendations as needed.         Laboratory:  Recent Labs   Lab 06/24/24  0543 06/24/24 2016 06/25/24  0321    136 136   K 5.4* 4.1 3.9    103 103   CO2 22* 22* 23   * 85* 91*   CREATININE 5.5* 4.8* 5.1*   * 211* 104       Recent Labs   Lab 06/22/24  0526 06/23/24  0442 06/24/24  0543 06/24/24 2016 06/25/24  0321   CALCIUM 9.3 9.1 9.0 8.6* 8.4*   ALBUMIN 3.2* 3.1*  --  2.8*  --    PHOS  --   --  6.0*  --   --    MG 2.7* 2.4 2.6  --  2.4       Recent Labs   Lab  "10/10/23  1113 01/22/24  1133 05/08/24  1139   PTH, Intact 583.1 H 506.7 H 291.7 H       No results for input(s): "POCTGLUCOSE" in the last 168 hours.    Recent Labs   Lab 07/05/23  1533 01/22/24  1133 06/19/24  0443   Hemoglobin A1C 5.6 5.4 6.8 H       Recent Labs   Lab 06/24/24  0543 06/24/24  1836 06/24/24 2016 06/25/24 0321 06/25/24  0519   WBC 8.76  --  11.11 8.35  --    HGB 11.8*  --  11.0* 10.5*  --    HCT 41.6   < > 37.5 34.1* 41     --  170 153  --    MCV 88  --  86 81*  --    MCHC 28.4*  --  29.3* 30.8*  --    MONO 10.2  0.9  --  9.4  1.0 10.3  0.9  --    EOSINOPHIL 1.8  --  1.2 2.6  --     < > = values in this interval not displayed.       Recent Labs   Lab 06/22/24  0526 06/23/24  0442 06/24/24 2016   BILITOT 0.6 0.7 0.7   PROT 6.1 5.8* 5.3*   ALBUMIN 3.2* 3.1* 2.8*   ALKPHOS 84 85 84   ALT 13 14 11   AST 11 16 19       Recent Labs   Lab 03/17/22  2240 04/07/22  1536 04/17/23  1448 07/14/23 2026 03/14/24  2254 05/09/24  1029 06/23/24  1816   Color, UA Colorless A   < > Colorless A  --  Yellow Yellow  --    Appearance, UA Clear   < > Clear  --  Clear Clear  --    pH, UA 5.0   < > 6.0  --  6.0 6.0  --    Specific [place], UA 1.010   < > 1.010  --  1.010 1.015  --    Protein, UA Negative   < > 1+ A  --  1+ A 1+ A  --    Glucose, UA Negative   < > Negative  --  Negative Negative  --    Ketones, UA Negative   < > Negative  --  Negative Negative  --    Urobilinogen, UA Negative  --   --   --  Negative  --   --    Bilirubin (UA) Negative   < > Negative  --  Negative Negative  --    Occult Blood UA Negative   < > Negative  --  TRACE Negative  --    Nitrite, UA Negative   < > Negative  --  Negative Negative  --    RBC, UA  --    < > 3   < > 3 1 >100 H   WBC, UA  --    < > 3   < > 5 1 >100 H   Bacteria  --    < > None   < > None Rare Many A   Hyaline Casts, UA  --    < > 0  --  0 0  --     < > = values in this interval not displayed.       Recent Labs   Lab 06/24/24  1836 06/25/24  0519   POC PH " 7.373 7.461 H   POC PCO2 41.2 32.9 L   POC HCO3 24.0 23.4 L   POC PO2 188 H 93   POC SATURATED O2 100 98   POC BE -1 0   Sample ARTERIAL ARTERIAL       Microbiology Results (last 7 days)       Procedure Component Value Units Date/Time    Culture, Respiratory with Gram Stain [[id number]] Collected: 06/24/24 1848    Order Status: Completed Specimen: Respiratory from Sputum, Expectorated Updated: 06/25/24 0721     Respiratory Culture No Growth     Gram Stain (Respiratory) <10 epithelial cells per low power field.     Gram Stain (Respiratory) Few WBC's     Gram Stain (Respiratory) No organisms seen    Blood culture [[id number]] Collected: 06/24/24 2016    Order Status: Completed Specimen: Blood from Peripheral, Hand, Right Updated: 06/25/24 0317     Blood Culture, Routine No Growth to date            Review of patient's allergies indicates:   Allergen Reactions    Percocet [oxycodone-acetaminophen] Itching    Amiodarone analogues      Itching      Irbesartan Swelling    Januvia [sitagliptin]     Jardiance [empagliflozin]      Leg cramps    Lipitor [atorvastatin] Other (See Comments)     Severe leg pain    Linaclotide Other (See Comments) and Nausea And Vomiting     Does not remember    Lubiprostone Other (See Comments) and Palpitations     Does not remember       Outpatient meds:  No current facility-administered medications on file prior to encounter.     Current Outpatient Medications on File Prior to Encounter   Medication Sig Dispense Refill    allopurinoL (ZYLOPRIM) 300 MG tablet Take 1 tablet (300 mg total) by mouth once daily. 90 tablet 3    amLODIPine (NORVASC) 10 MG tablet Take 10 mg by mouth once daily.      calcitRIOL (ROCALTROL) 0.5 MCG Cap Take 0.5 mcg by mouth once daily.      carvediloL (COREG) 25 MG tablet Take 1 tablet (25 mg total) by mouth 2 (two) times daily. 180 tablet 2    cyanocobalamin (VITAMIN B-12) 100 MCG tablet Take 100 mcg by mouth once daily.      evolocumab (REPATHA SURECLICK) 140 mg/mL  PnIj Inject 1 mL (140 mg total) into the skin every 14 (fourteen) days. 2 mL 11    furosemide (LASIX) 40 MG tablet Take 40 mg by mouth once daily.      loratadine (CLARITIN) 10 mg tablet Take 10 mg by mouth once daily.      magnesium oxide (MAG-OX) 400 mg (241.3 mg magnesium) tablet Take 1 tablet (400 mg total) by mouth daily as needed (cramping). 30 tablet 0    blood sugar diagnostic (TRUE METRIX GLUCOSE TEST STRIP) Strp Use 1x daily. Insurance preferred. 100 strip 3    blood sugar diagnostic Strp To check BG 1 time daily, to use with insurance preferred meter 100 strip 3    cloNIDine (CATAPRES) 0.1 MG tablet Take 1 tablet (0.1 mg total) by mouth 3 (three) times daily as needed (PRN SBP > 165 mmHg). 90 tablet 6    cloNIDine 0.1 mg/24 hr td ptwk (CATAPRES) 0.1 mg/24 hr Place 1 patch onto the skin every 7 days. 4 patch 4    lancets Misc To check BG 1 times daily, to use with insurance preferred meter 100 each 3    nitroGLYCERIN (NITROSTAT) 0.4 MG SL tablet Place 1 tablet (0.4 mg total) under the tongue every 5 (five) minutes as needed for Chest pain. 25 tablet 0    [DISCONTINUED] aspirin (ECOTRIN) 81 MG EC tablet Take 1 tablet (81 mg total) by mouth once daily. 90 tablet 3    [DISCONTINUED] DULoxetine (CYMBALTA) 20 MG capsule TAKE ONE CAPSULE BY MOUTH ONCE DAILY 30 capsule 4    [DISCONTINUED] folic acid (FOLVITE) 1 MG tablet Take 1 mg by mouth once daily.      [DISCONTINUED] metFORMIN (GLUCOPHAGE-XR) 500 MG ER 24hr tablet Take 2 tablets (1,000 mg total) by mouth 2 (two) times daily with meals. 360 tablet 3    [DISCONTINUED] sodium bicarbonate 650 MG tablet Take 1 tablet (650 mg total) by mouth once daily. 30 tablet 11       Scheduled meds:   cefTRIAXone (Rocephin) IV (PEDS and ADULTS)  1 g Intravenous Q24H    epoetin alaina (PROCRIT) injection  50 Units/kg Intravenous Every Mon, Wed, Fri    famotidine  20 mg Oral Daily       Infusions:   amiodarone in dextrose 5%  1 mg/min Intravenous Continuous   Stopped at 06/24/24  2010    NORepinephrine bitartrate-D5W  0-3 mcg/kg/min Intravenous Continuous   Stopped at 06/24/24 2215    propofoL  0-50 mcg/kg/min Intravenous Continuous   Stopped at 06/25/24 1205         PRN meds:    Current Facility-Administered Medications:     acetaminophen, 650 mg, Oral, Q8H PRN    aluminum-magnesium hydroxide-simethicone, 30 mL, Oral, QID PRN    benzonatate, 100 mg, Oral, TID PRN    dextrose 50%, 12.5 g, Intravenous, PRN    dextrose 50%, 25 g, Intravenous, PRN    glucagon (human recombinant), 1 mg, Intramuscular, PRN    glucose, 16 g, Oral, PRN    glucose, 24 g, Oral, PRN    HYDROcodone-acetaminophen, 1 tablet, Oral, Q6H PRN    insulin aspart U-100, 0-10 Units, Subcutaneous, QID (AC + HS) PRN    melatonin, 6 mg, Oral, Nightly PRN    ondansetron, 4 mg, Intravenous, Q6H PRN    ondansetron, 4 mg, Intravenous, Daily PRN    Pharmacy to dose Vancomycin consult, , , Once **AND** vancomycin - pharmacy to dose, , Intravenous, pharmacy to manage frequency      OBJECTIVE:     Vital Signs and IO:  Temp:  [97.4 °F (36.3 °C)-98.6 °F (37 °C)]   Pulse:  []   Resp:  [10-27]   BP: ()/(50-83)   SpO2:  [95 %-100 %]   I/O last 3 completed shifts:  In: 1637.8 [I.V.:387.8; Other:750; IV Piggyback:500]  Out: 1600 [Urine:100; Other:1500]  Wt Readings from Last 5 Encounters:   06/22/24 (!) 147.9 kg (326 lb 1 oz)   06/25/24 (!) 147.9 kg (326 lb)   06/19/24 132.5 kg (292 lb 1.8 oz)   06/04/24 134.3 kg (296 lb)   05/27/24 134.3 kg (296 lb)     Body mass index is 51.07 kg/m².    Physical Exam  Constitutional:       General: Patient is not in acute distress.     Appearance: Patient is well-developed. She is not diaphoretic.   HENT:      Head: Normocephalic and atraumatic.      Mouth/Throat: Mucous membranes are moist.   Eyes:      General: No scleral icterus.     Pupils: Pupils are equal, round, and reactive to light.   Cardiovascular:      Rate and Rhythm: Normal rate and regular rhythm.   Pulmonary:      Effort: Pulmonary  effort is normal. No respiratory distress.      Breath sounds: No stridor.   Abdominal:      General: There is no distension.      Palpations: Abdomen is soft.   Musculoskeletal:         General: No deformity. Normal range of motion.      Cervical back: Neck supple.   Skin:     General: Skin is warm and dry.      Findings: No rash present. No erythema.   Neurological:      Mental Status: Patient is alert and oriented to person, place, and time.      Cranial Nerves: No cranial nerve deficit.   Psychiatric:         Behavior: Behavior normal.          Patient care time was spent personally by me on the following activities: > 35 min    Obtaining a history.  Examination of patient.  Providing medical care at the patients bedside.  Developing a treatment plan with patient or surrogate and bedside caregivers.  Ordering and reviewing laboratory studies, radiographic studies, pulse oximetry.  Ordering and performing treatments and interventions.  Evaluation of patient's response to treatment.  Discussions with consultants while on the unit and immediately available to the patient.  Re-evaluation of the patient's condition.  Documentation in the medical record.     Hector Blakely MD    [facility]  [address] [place] [id number]    [phone] - tel  [phone] - fax    6/25/2024

## 2024-06-25 NOTE — ASSESSMENT & PLAN NOTE
Stable  Continue with medical management  + troponin . Unsure arrest secondary to cardiac cause . H/o a fib noted

## 2024-06-25 NOTE — PROCEDURES
Procedure Location:room [facility]  Education/need:midline  Prep:chloraprep  Supplies:   Brand:BD   Gauge/ Length:18ga/10cm   Lot #:[id number]  Extremity:right upper  Vessel:basilic  Attempts:1  Inserted by:Joel Figueredo RN  Date/time placed:06/25/2024/1322  Tolerated:well  Dressing applied:CHG drsg applied

## 2024-06-25 NOTE — PROGRESS NOTES
[facility] Medicine  Progress Note    Patient Name: Juhi Taylor  MRN: [id number]  Patient Class: IP- Inpatient   Admission Date: 6/18/2024  Length of Stay: 6 days  Attending Physician: Manuel Christensen MD  Primary Care Provider: Tony Sadler MD        Subjective:     Principal Problem:Chronic kidney disease (CKD), stage V        HPI:  73 year old pt getting admitted with fluid overload in need of dialysis  Pt had PD catheter insertion 2 weeks ago  She was supposed to start on PD for ESRD but got delayed due to insurance issues  She has oliguria/increased weight gain in past 2 weeks  Also SOB on exertion and cough  Symptoms got worse and she came to ER and got admitted  Pt was started on lasix gtt iv         Overview/Hospital Course:  Patient is a 73-year-old female who was admitted for acute hypoxic respiratory failure likely multifactorial from CKD and CHF .  Patient had PD cath placed 2 weeks ago however due to insurance situation never received dialysis. EF 40 -45% with elevated BNP. Patient was placed on Furosemide and metolazone with poor urine output eventually leading to dialysis.Nephrology is following. Discontinued antibiotics since infection was ruled out.     Interval History:     Patient was seen and examined along with the team  Patient had cardiac arrest PE yesterday and got ROSC  Currently slightly bradycardic, EKG did not show heart block  Echocardiogram with low ejection fraction 45%.  Patient is getting oozing from the left dialysis line.  Hemoglobin stable and continued to be on the vent.  Cardiology consulted for positive troponins.  Still unsure cause of the PEA arrest    Review of Systems  Objective:     Vital Signs (Most Recent):  Temp: 97.4 °F (36.3 °C) (06/25/24 0301)  Pulse: (!) 45 (06/25/24 0900)  Resp: (!) 26 (06/25/24 0345)  BP: (!) 147/75 (06/25/24 0900)  SpO2: 100 % (06/25/24 0900) Vital Signs (24h Range):  Temp:  [97.4 °F (36.3 °C)-98.6 °F (37  "°C)] 97.4 °F (36.3 °C)  Pulse:  [] 45  Resp:  [10-27] 26  SpO2:  [95 %-100 %] 100 %  BP: ()/(50-83) 147/75     Weight: (!) 147.9 kg (326 lb 1 oz)  Body mass index is 51.07 kg/m².    Intake/Output Summary (Last 24 hours) at 6/25/2024 1309  Last data filed at 6/25/2024 0601  Gross per 24 hour   Intake 1637.84 ml   Output 1600 ml   Net 37.84 ml         Physical Exam  Vitals and nursing note reviewed.   Constitutional:       Comments: sedated   HENT:      Head: Normocephalic and atraumatic.   Eyes:      Conjunctiva/sclera: Conjunctivae normal.   Neck:      Vascular: No JVD.   Cardiovascular:      Heart sounds: Normal heart sounds.   Pulmonary:      Effort: Pulmonary effort is normal.      Comments: Decreased bilateral  Abdominal:      General: Bowel sounds are normal.      Palpations: Abdomen is soft.   Skin:     General: Skin is warm.   Neurological:      Comments: intubated             Significant Labs: All pertinent labs within the past 24 hours have been reviewed.  CBC:   Recent Labs   Lab 06/24/24  0543 06/24/24  1836 06/24/24 2016 06/25/24  0321 06/25/24  0519   WBC 8.76  --  11.11 8.35  --    HGB 11.8*  --  11.0* 10.5*  --    HCT 41.6   < > 37.5 34.1* 41     --  170 153  --     < > = values in this interval not displayed.     CMP:   Recent Labs   Lab 06/24/24  0543 06/24/24 2016 06/25/24  0321    136 136   K 5.4* 4.1 3.9    103 103   CO2 22* 22* 23   * 211* 104   * 85* 91*   CREATININE 5.5* 4.8* 5.1*   CALCIUM 9.0 8.6* 8.4*   PROT  --  5.3*  --    ALBUMIN  --  2.8*  --    BILITOT  --  0.7  --    ALKPHOS  --  84  --    AST  --  19  --    ALT  --  11  --    ANIONGAP 8 11 10     Cardiac Markers: No results for input(s): "CKMB", "MYOGLOBIN", "BNP", "TROPISTAT" in the last 48 hours.    Significant Imaging: I have reviewed all pertinent imaging results/findings within the past 24 hours.    Assessment/Plan:      * Chronic kidney disease (CKD), stage V  Patient had PD cath " "placed 2 weeks ago however due to insurance situation never received dialysis  Tunneled dialysis catheter was placed 6/22  Revision 6/23  Patient has persistent oozing from dialysis catheter, we will discontinue full-dose Lovenox now.  Patient has persistent hypotension during dialysis, we will discontinue Coreg now.    6/24, doing better, pending aggressive dialysis, likely we will resume Eliquis from a.m..  Case management following for dialysis chair set up.       Cardiac arrest  Rhythm PEA. No bradycardia   Possible cardiac cause . Consult cardiology   Cannot give blood thinners. Oozing from tunnel catheter   May need PE study when patient more stable   Cardiology consulted      CHF (congestive heart failure)  EF 40-45% with elevated BNP  S/p tunnel line and nephrology will do HD  Nephrology is following       Morbid obesity  Body mass index is 51.07 kg/m². Morbid obesity complicates all aspects of disease management from diagnostic modalities to treatment.     Acute hypoxic respiratory failure  Patient has severe fluid overload secondary to ESRD, cardiac arrest in HD hemodialysis  Likely multifactorial from CHF with worsening CKD  EF 40-45% with elevated BNP  HD decision as per nephrology    Status post tunnel line 6/22  Nephrology is following  Intubated with cardiac arrest 6/24      Renal cell carcinoma of right kidney  Aware       Paroxysmal atrial fibrillation  Chronic stable issue  Continue carvedilol, Holding home apixaban for now  No oozing from HD tunnel catheter      Type 2 diabetes mellitus with hyperglycemia  Patient's FSGs are controlled on current medication regimen.  Last A1c reviewed-   Lab Results   Component Value Date    HGBA1C 6.8 (H) 06/19/2024     Most recent fingerstick glucose reviewed- No results for input(s): "POCTGLUCOSE" in the last 24 hours.  Current correctional scale  Medium  Maintain anti-hyperglycemic dose as follows-   Antihyperglycemics (From admission, onward)      Start     " Stop Route Frequency Ordered    06/18/24 1619  insulin aspart U-100 pen 0-10 Units         -- SubQ Before meals & nightly PRN 06/18/24 1519          Hold Oral hypoglycemics while patient is in the hospital.    Essential hypertension  Chronic, controlled. Latest blood pressure and vitals reviewed-     Temp:  [97.4 °F (36.3 °C)-98.6 °F (37 °C)]   Pulse:  []   Resp:  [10-27]   BP: ()/(50-83)   SpO2:  [95 %-100 %] .   Home meds for hypertension were reviewed and noted below.   Hypertension Medications               carvediloL (COREG) 25 MG tablet Take 1 tablet (25 mg total) by mouth 2 (two) times daily.    cloNIDine (CATAPRES) 0.1 MG tablet Take 1 tablet (0.1 mg total) by mouth 3 (three) times daily as needed (PRN SBP > 165 mmHg).    cloNIDine 0.1 mg/24 hr td ptwk (CATAPRES) 0.1 mg/24 hr Place 1 patch onto the skin every 7 days.    furosemide (LASIX) 40 MG tablet Take 40 mg by mouth once daily.    amLODIPine (NORVASC) 10 MG tablet Take 10 mg by mouth once daily.    nitroGLYCERIN (NITROSTAT) 0.4 MG SL tablet Place 1 tablet (0.4 mg total) under the tongue every 5 (five) minutes as needed for Chest pain.            While in the hospital, will manage blood pressure as follows; Continue home antihypertensive regimen    Will utilize p.r.n. blood pressure medication only if patient's blood pressure greater than 140/90 and she develops symptoms such as worsening chest pain or shortness of breath.    Coronary artery disease  Stable  Continue with medical management  + troponin . Unsure arrest secondary to cardiac cause . H/o a fib noted      VTE Risk Mitigation (From admission, onward)           Ordered     IP VTE HIGH RISK PATIENT  Once         06/18/24 1519     Place sequential compression device  Until discontinued         06/18/24 1519                    Discharge Planning   HARINI:      Code Status: Full Code   Is the patient medically ready for discharge?:     Reason for patient still in hospital (select all that  apply): Treatment  Discharge Plan A: Rehab            Critical care time spent on the evaluation and treatment of severe organ dysfunction, review of pertinent labs and imaging studies, discussions with consulting providers and discussions with patient/family: 34 minutes.      Manuel Christensen MD  Department of Hospital Medicine   [facility]

## 2024-06-25 NOTE — ASSESSMENT & PLAN NOTE
"Patient's FSGs are controlled on current medication regimen.  Last A1c reviewed-   Lab Results   Component Value Date    HGBA1C 6.8 (H) 06/19/2024     Most recent fingerstick glucose reviewed- No results for input(s): "POCTGLUCOSE" in the last 24 hours.  Current correctional scale  Medium  Maintain anti-hyperglycemic dose as follows-   Antihyperglycemics (From admission, onward)    Start     Stop Route Frequency Ordered    06/18/24 1619  insulin aspart U-100 pen 0-10 Units         -- SubQ Before meals & nightly PRN 06/18/24 1519        Hold Oral hypoglycemics while patient is in the hospital.  "

## 2024-06-25 NOTE — PT/OT/SLP PROGRESS
Physical Therapy      Patient Name:  Juhi Taylor   MRN:  [id number]    Patient not seen today secondary to change in status with patient requiring intubation yesterday. PT orders discontinued. She will need new therapy orders if extubated and able to participate with skilled therapy.

## 2024-06-25 NOTE — ASSESSMENT & PLAN NOTE
Patient has severe fluid overload secondary to ESRD, cardiac arrest in HD hemodialysis  Likely multifactorial from CHF with worsening CKD  EF 40-45% with elevated BNP  HD decision as per nephrology    Status post tunnel line 6/22  Nephrology is following  Intubated with cardiac arrest 6/24

## 2024-06-25 NOTE — ASSESSMENT & PLAN NOTE
Chronic, controlled. Latest blood pressure and vitals reviewed-     Temp:  [97.4 °F (36.3 °C)-98.6 °F (37 °C)]   Pulse:  []   Resp:  [10-27]   BP: ()/(50-83)   SpO2:  [95 %-100 %] .   Home meds for hypertension were reviewed and noted below.   Hypertension Medications               carvediloL (COREG) 25 MG tablet Take 1 tablet (25 mg total) by mouth 2 (two) times daily.    cloNIDine (CATAPRES) 0.1 MG tablet Take 1 tablet (0.1 mg total) by mouth 3 (three) times daily as needed (PRN SBP > 165 mmHg).    cloNIDine 0.1 mg/24 hr td ptwk (CATAPRES) 0.1 mg/24 hr Place 1 patch onto the skin every 7 days.    furosemide (LASIX) 40 MG tablet Take 40 mg by mouth once daily.    amLODIPine (NORVASC) 10 MG tablet Take 10 mg by mouth once daily.    nitroGLYCERIN (NITROSTAT) 0.4 MG SL tablet Place 1 tablet (0.4 mg total) under the tongue every 5 (five) minutes as needed for Chest pain.            While in the hospital, will manage blood pressure as follows; Continue home antihypertensive regimen    Will utilize p.r.n. blood pressure medication only if patient's blood pressure greater than 140/90 and she develops symptoms such as worsening chest pain or shortness of breath.

## 2024-06-25 NOTE — PLAN OF CARE
Problem: Infection  Goal: Absence of Infection Signs and Symptoms  Outcome: Progressing     Problem: Adult Inpatient Plan of Care  Goal: Plan of Care Review  Outcome: Progressing  Goal: Patient-Specific Goal (Individualized)  Outcome: Progressing  Goal: Absence of Hospital-Acquired Illness or Injury  Outcome: Progressing  Goal: Optimal Comfort and Wellbeing  Outcome: Progressing  Goal: Readiness for Transition of Care  Outcome: Progressing     Problem: Bariatric Environmental Safety  Goal: Safety Maintained with Care  Outcome: Progressing     Problem: Fall Injury Risk  Goal: Absence of Fall and Fall-Related Injury  Outcome: Progressing     Problem: Skin Injury Risk Increased  Goal: Skin Health and Integrity  Outcome: Progressing     Problem: Diabetes Comorbidity  Goal: Blood Glucose Level Within Targeted Range  Outcome: Progressing     Problem: Wound  Goal: Optimal Coping  Outcome: Progressing  Goal: Optimal Functional Ability  Outcome: Progressing  Goal: Absence of Infection Signs and Symptoms  Outcome: Progressing  Goal: Improved Oral Intake  Outcome: Progressing  Goal: Optimal Pain Control and Function  Outcome: Progressing  Goal: Skin Health and Integrity  Outcome: Progressing  Goal: Optimal Wound Healing  Outcome: Progressing     Problem: Hemodialysis  Goal: Safe, Effective Therapy Delivery  Outcome: Progressing  Goal: Effective Tissue Perfusion  Outcome: Progressing  Goal: Absence of Infection Signs and Symptoms  Outcome: Progressing     Problem: Restraint, Nonviolent  Goal: Absence of Harm or Injury  Outcome: Progressing

## 2024-06-26 LAB
ALLENS TEST: ABNORMAL
ALLENS TEST: ABNORMAL
ANION GAP SERPL CALC-SCNC: 12 MMOL/L (ref 8–16)
BACTERIA SPEC AEROBE CULT: NORMAL
BASOPHILS # BLD AUTO: 0.03 K/UL (ref 0–0.2)
BASOPHILS NFR BLD: 0.4 % (ref 0–1.9)
BUN SERPL-MCNC: 63 MG/DL (ref 8–23)
CALCIUM SERPL-MCNC: 8.7 MG/DL (ref 8.7–10.5)
CHLORIDE SERPL-SCNC: 104 MMOL/L (ref 95–110)
CO2 SERPL-SCNC: 21 MMOL/L (ref 23–29)
CREAT SERPL-MCNC: 4.2 MG/DL (ref 0.5–1.4)
DELSYS: ABNORMAL
DELSYS: ABNORMAL
DIFFERENTIAL METHOD BLD: ABNORMAL
EOSINOPHIL # BLD AUTO: 0.3 K/UL (ref 0–0.5)
EOSINOPHIL NFR BLD: 3.6 % (ref 0–8)
ERYTHROCYTE [DISTWIDTH] IN BLOOD BY AUTOMATED COUNT: 19.8 % (ref 11.5–14.5)
ERYTHROCYTE [SEDIMENTATION RATE] IN BLOOD BY WESTERGREN METHOD: 22 MM/H
EST. GFR  (NO RACE VARIABLE): 10.7 ML/MIN/1.73 M^2
FIO2: 35
GLUCOSE SERPL-MCNC: 131 MG/DL (ref 70–110)
GLUCOSE SERPL-MCNC: 79 MG/DL (ref 70–110)
GLUCOSE SERPL-MCNC: 95 MG/DL (ref 70–110)
GLUCOSE SERPL-MCNC: 99 MG/DL (ref 70–110)
GRAM STN SPEC: NORMAL
HCO3 UR-SCNC: 23.2 MMOL/L (ref 24–28)
HCO3 UR-SCNC: 26.5 MMOL/L (ref 24–28)
HCT VFR BLD AUTO: 39.1 % (ref 37–48.5)
HCT VFR BLD CALC: 40 %PCV (ref 36–54)
HGB BLD-MCNC: 11.6 G/DL (ref 12–16)
IMM GRANULOCYTES # BLD AUTO: 0.02 K/UL (ref 0–0.04)
IMM GRANULOCYTES NFR BLD AUTO: 0.2 % (ref 0–0.5)
LYMPHOCYTES # BLD AUTO: 1.7 K/UL (ref 1–4.8)
LYMPHOCYTES NFR BLD: 20.8 % (ref 18–48)
MAGNESIUM SERPL-MCNC: 2.3 MG/DL (ref 1.6–2.6)
MCH RBC QN AUTO: 25.4 PG (ref 27–31)
MCHC RBC AUTO-ENTMCNC: 29.7 G/DL (ref 32–36)
MCV RBC AUTO: 84 FL (ref 82–98)
MIN VOL: 8.84
MODE: ABNORMAL
MODE: ABNORMAL
MONOCYTES # BLD AUTO: 0.8 K/UL (ref 0.3–1)
MONOCYTES NFR BLD: 10.3 % (ref 4–15)
NEUTROPHILS # BLD AUTO: 5.2 K/UL (ref 1.8–7.7)
NEUTROPHILS NFR BLD: 64.7 % (ref 38–73)
NRBC BLD-RTO: 0 /100 WBC
PCO2 BLDA: 37.2 MMHG (ref 35–45)
PCO2 BLDA: 39 MMHG (ref 35–45)
PEEP: 5
PEEP: 5
PH SMN: 7.4 [PH] (ref 7.35–7.45)
PH SMN: 7.44 [PH] (ref 7.35–7.45)
PIP: 29
PLATELET # BLD AUTO: 87 K/UL (ref 150–450)
PMV BLD AUTO: 9.7 FL (ref 9.2–12.9)
PO2 BLDA: 94 MMHG (ref 80–100)
PO2 BLDA: 95 MMHG (ref 80–100)
POC BE: -2 MMOL/L
POC BE: 2 MMOL/L
POC IONIZED CALCIUM: 1.17 MMOL/L (ref 1.06–1.42)
POC SATURATED O2: 97 % (ref 95–100)
POC SATURATED O2: 98 % (ref 95–100)
POC TCO2: 24 MMOL/L (ref 23–27)
POC TCO2: 28 MMOL/L (ref 23–27)
POTASSIUM BLD-SCNC: 4.1 MMOL/L (ref 3.5–5.1)
POTASSIUM SERPL-SCNC: 4.8 MMOL/L (ref 3.5–5.1)
PS: 12
RBC # BLD AUTO: 4.57 M/UL (ref 4–5.4)
SAMPLE: ABNORMAL
SAMPLE: ABNORMAL
SITE: ABNORMAL
SITE: ABNORMAL
SODIUM BLD-SCNC: 137 MMOL/L (ref 136–145)
SODIUM SERPL-SCNC: 137 MMOL/L (ref 136–145)
SP02: 100
TROPONIN I SERPL HS-MCNC: 47.2 PG/ML (ref 0–14.9)
VANCOMYCIN SERPL-MCNC: 12.4 UG/ML
VT: 400
WBC # BLD AUTO: 8.03 K/UL (ref 3.9–12.7)

## 2024-06-26 PROCEDURE — 25000003 PHARM REV CODE 250: Performed by: HOSPITALIST

## 2024-06-26 PROCEDURE — 99291 CRITICAL CARE FIRST HOUR: CPT | Mod: ,,, | Performed by: STUDENT IN AN ORGANIZED HEALTH CARE EDUCATION/TRAINING PROGRAM

## 2024-06-26 PROCEDURE — 94761 N-INVAS EAR/PLS OXIMETRY MLT: CPT | Mod: XB

## 2024-06-26 PROCEDURE — 94003 VENT MGMT INPAT SUBQ DAY: CPT

## 2024-06-26 PROCEDURE — 63600175 PHARM REV CODE 636 W HCPCS: Mod: JZ,EC,JG | Performed by: INTERNAL MEDICINE

## 2024-06-26 PROCEDURE — 27100171 HC OXYGEN HIGH FLOW UP TO 24 HOURS

## 2024-06-26 PROCEDURE — 36415 COLL VENOUS BLD VENIPUNCTURE: CPT | Performed by: INTERNAL MEDICINE

## 2024-06-26 PROCEDURE — 25000003 PHARM REV CODE 250: Performed by: NURSE PRACTITIONER

## 2024-06-26 PROCEDURE — 36600 WITHDRAWAL OF ARTERIAL BLOOD: CPT

## 2024-06-26 PROCEDURE — 99900026 HC AIRWAY MAINTENANCE (STAT)

## 2024-06-26 PROCEDURE — 25000003 PHARM REV CODE 250: Performed by: STUDENT IN AN ORGANIZED HEALTH CARE EDUCATION/TRAINING PROGRAM

## 2024-06-26 PROCEDURE — 99900035 HC TECH TIME PER 15 MIN (STAT)

## 2024-06-26 PROCEDURE — 99900031 HC PATIENT EDUCATION (STAT)

## 2024-06-26 PROCEDURE — 90935 HEMODIALYSIS ONE EVALUATION: CPT

## 2024-06-26 PROCEDURE — 63600175 PHARM REV CODE 636 W HCPCS: Performed by: HOSPITALIST

## 2024-06-26 PROCEDURE — 20000000 HC ICU ROOM

## 2024-06-26 PROCEDURE — 63600175 PHARM REV CODE 636 W HCPCS: Performed by: STUDENT IN AN ORGANIZED HEALTH CARE EDUCATION/TRAINING PROGRAM

## 2024-06-26 PROCEDURE — 80048 BASIC METABOLIC PNL TOTAL CA: CPT | Performed by: HOSPITALIST

## 2024-06-26 PROCEDURE — 25000003 PHARM REV CODE 250: Performed by: INTERNAL MEDICINE

## 2024-06-26 PROCEDURE — 93005 ELECTROCARDIOGRAM TRACING: CPT | Performed by: GENERAL PRACTICE

## 2024-06-26 PROCEDURE — 99233 SBSQ HOSP IP/OBS HIGH 50: CPT | Mod: ,,, | Performed by: INTERNAL MEDICINE

## 2024-06-26 PROCEDURE — 84484 ASSAY OF TROPONIN QUANT: CPT | Performed by: INTERNAL MEDICINE

## 2024-06-26 PROCEDURE — 85025 COMPLETE CBC W/AUTO DIFF WBC: CPT | Performed by: INTERNAL MEDICINE

## 2024-06-26 PROCEDURE — 80202 ASSAY OF VANCOMYCIN: CPT | Performed by: INTERNAL MEDICINE

## 2024-06-26 PROCEDURE — 63600175 PHARM REV CODE 636 W HCPCS: Performed by: INTERNAL MEDICINE

## 2024-06-26 PROCEDURE — 82803 BLOOD GASES ANY COMBINATION: CPT

## 2024-06-26 PROCEDURE — 93010 ELECTROCARDIOGRAM REPORT: CPT | Mod: ,,, | Performed by: GENERAL PRACTICE

## 2024-06-26 PROCEDURE — 83735 ASSAY OF MAGNESIUM: CPT | Performed by: INTERNAL MEDICINE

## 2024-06-26 RX ORDER — METOPROLOL TARTRATE 25 MG/1
12.5 TABLET ORAL 2 TIMES DAILY
Status: DISCONTINUED | OUTPATIENT
Start: 2024-06-26 | End: 2024-06-27

## 2024-06-26 RX ORDER — DEXMEDETOMIDINE HYDROCHLORIDE 4 UG/ML
0-1.4 INJECTION, SOLUTION INTRAVENOUS CONTINUOUS
Status: DISCONTINUED | OUTPATIENT
Start: 2024-06-26 | End: 2024-06-28

## 2024-06-26 RX ORDER — FENTANYL CITRATE-0.9 % NACL/PF 10 MCG/ML
0-250 PLASTIC BAG, INJECTION (ML) INTRAVENOUS CONTINUOUS
Status: DISCONTINUED | OUTPATIENT
Start: 2024-06-27 | End: 2024-06-27

## 2024-06-26 RX ADMIN — DEXMEDETOMIDINE HYDROCHLORIDE 0.4 MCG/KG/HR: 4 INJECTION, SOLUTION INTRAVENOUS at 03:06

## 2024-06-26 RX ADMIN — METOPROLOL TARTRATE 12.5 MG: 25 TABLET, FILM COATED ORAL at 08:06

## 2024-06-26 RX ADMIN — CEFTRIAXONE SODIUM 1 G: 1 INJECTION, POWDER, FOR SOLUTION INTRAMUSCULAR; INTRAVENOUS at 08:06

## 2024-06-26 RX ADMIN — PROPOFOL 30 MCG/KG/MIN: 10 INJECTION, EMULSION INTRAVENOUS at 08:06

## 2024-06-26 RX ADMIN — DEXMEDETOMIDINE HYDROCHLORIDE 0.2 MCG/KG/HR: 4 INJECTION, SOLUTION INTRAVENOUS at 11:06

## 2024-06-26 RX ADMIN — Medication 25 MCG/HR: at 11:06

## 2024-06-26 RX ADMIN — DEXMEDETOMIDINE HYDROCHLORIDE 0.9 MCG/KG/HR: 4 INJECTION, SOLUTION INTRAVENOUS at 08:06

## 2024-06-26 RX ADMIN — VANCOMYCIN HYDROCHLORIDE 500 MG: 500 INJECTION, POWDER, LYOPHILIZED, FOR SOLUTION INTRAVENOUS at 08:06

## 2024-06-26 RX ADMIN — PROPOFOL 20 MCG/KG/MIN: 10 INJECTION, EMULSION INTRAVENOUS at 10:06

## 2024-06-26 RX ADMIN — EPOETIN ALFA-EPBX 7400 UNITS: 10000 INJECTION, SOLUTION INTRAVENOUS; SUBCUTANEOUS at 01:06

## 2024-06-26 RX ADMIN — PROPOFOL 30 MCG/KG/MIN: 10 INJECTION, EMULSION INTRAVENOUS at 05:06

## 2024-06-26 RX ADMIN — FAMOTIDINE 20 MG: 20 TABLET ORAL at 08:06

## 2024-06-26 NOTE — CARE UPDATE
06/26/24 0730   Patient Assessment/Suction   Level of Consciousness (AVPU) responds to voice   Respiratory Effort Unlabored   Expansion/Accessory Muscles/Retractions expansion symmetric   All Lung Fields Breath Sounds Anterior:;Lateral:;coarse   Rhythm/Pattern, Respiratory assisted mechanically;unlabored;pattern regular;depth regular   Cough Frequency with stimulation   Cough Type assisted   Suction Method oral;tracheal   Suction Pressure (mmHg) -120 mmHg   $ Suction Charges Inline Suction Procedure Stat Charge   Secretions Amount moderate   Secretions Color yellow   Secretions Characteristics thick   Skin Integrity   $ Wound Care Tech Time 15 min   Area Observed Left;Right;Cheek;Upper lip   Skin Appearance without discoloration   PRE-TX-O2   Device (Oxygen Therapy) ventilator   $ Is the patient on High Flow Oxygen? Yes   Oxygen Concentration (%) 30   SpO2 99 %   Pulse Oximetry Type Continuous   $ Pulse Oximetry - Multiple Charge Pulse Oximetry - Multiple   Pulse (!) 59   Resp (!) 22        Airway - Non-Surgical 06/24/24 1700 Endotracheal Tube   Placement Date/Time: 06/24/24 1700   Method of Intubation: Video Laryngoscopy  Inserted by: MD  Airway Device: Endotracheal Tube  Airway Device Size: 7.5  Style: Cuffed  Cuffed: Cuffed  Cuff Inflation: Minimal occlusive pressure  Cuff Inflated With: A...   Secured at 23 cm   Measured At Lips   Secured Location Left   Secured by Commercial tube quezada   Position of ETT in xRay In good position   Status Intact;Secured   Site Assessment Clean;Dry   Cuff Pressure   (green)   Airway Safety   Is Ambu Bag and Mask with Patient? Yes, Adult Ambu Bag and Mask   Suction set is at the bedside? Yes   Vent Select   Conventional Vent Y   $ Ventilator Subsequent 1   Charged w/in last 24h YES   Preset Conventional Ventilator Settings   Vent ID [id number]   Vent Type    Ventilation Type VC   Vent Mode A/C   Humidity HME   Set Rate 22 BPM   Vt Set 400 mL   PEEP/CPAP 6.2 cmH20   Waveform RAMP  "  Peak Flow 60 L/min   Trigger Sensitivity Flow/I-Trigger 3 L/min   Patient Ventilator Parameters   Resp Rate Total 23 br/min   Peak Airway Pressure 26 cmH20   Mean Airway Pressure 12 cmH20   Exhaled Vt 437 mL   Total Ve 7.68 L/m   I:E Ratio Measured 1:2.8   Conventional Ventilator Alarms   Alarms On Y   Ve High Alarm 25 L/min   Ve Low Alarm 5 L/min   Resp Rate High Alarm 40 br/min   Apnea Rate 14   Apnea Volume (mL) 0 mL   Apnea Oxygen Concentration  100   Apnea Flow Rate (L/min) 60   T Apnea 20 sec(s)   IHI Ventilator Associated Pneumonia Bundle (Required)   Oral Care Lip moisturizer applied;Mouth swabbed;Mouth moisturizer;Mouth suctioned   Ready to Wean/Extubation Screen   FIO2<=50 (chart decimal) 0.3   MV<16L (chart vol.) 7.68   PEEP <=8 (chart #) 6.2   Ready to Wean Parameters   F/VT Ratio<105 (RSBI) (!) 50.34   IBW/VT Calculations   Height 5' 7" (1.702 m)   IBW/kg (Calculated) Female 61.6 kg   Low Range Vt 4cc/kg FEMALE 246.4 mL   Low Range Vt 6cc/kg FEMALE 369.6 mL   Adult Moderate Range vt 8cc/kg FEMALE 492.8 mL   Adult High Range Vt 10cc/kg FEMALE 616 mL   Current VT Set/IBW (Calculated) FEMALE 6.49 ML\Kg   Education   $ Education Suction;Ventilator Oxygen;30 min     "

## 2024-06-26 NOTE — NURSING
Nurses Note -- 4 Eyes      6/26/2024   5:34 AM      Skin assessed during: Q Shift Change      [] No Altered Skin Integrity Present    []Prevention Measures Documented      [x] Yes- Altered Skin Integrity Present or Discovered   [] LDA Added if Not in Epic (Describe Wound)   [] New Altered Skin Integrity was Present on Admit and Documented in LDA   [] Wound Image Taken    Wound Care Consulted? No    Attending Nurse:  Viji Strickland RN/Staff Member:   Sheila CHAUDHARY

## 2024-06-26 NOTE — PLAN OF CARE
Spoke with Pat at [facility], patient has a chair approved but the request will be placed on hold given patient's current medical status.        06/26/24 1321   Post-Acute Status   Post-Acute Authorization Dialysis   Post-Acute Placement Status Referrals Sent

## 2024-06-26 NOTE — RESPIRATORY THERAPY
06/25/24 1920   Patient Assessment/Suction   Level of Consciousness (AVPU) responds to pain   Respiratory Effort Normal;Unlabored   Expansion/Accessory Muscles/Retractions no retractions;no use of accessory muscles   All Lung Fields Breath Sounds coarse   Rhythm/Pattern, Respiratory assisted mechanically   Cough Frequency with stimulation   Cough Type assisted   Suction Method tracheal   Suction Pressure (mmHg) -120 mmHg   $ Suction Charges Inline Suction Procedure Stat Charge   Secretions Amount moderate   Secretions Color white;yellow;red-streaked   Secretions Characteristics thick   Skin Integrity   $ Wound Care Tech Time 15 min   Area Observed Upper lip;Lower lip;Corner lip   Skin Appearance without discoloration   PRE-TX-O2   Device (Oxygen Therapy) ventilator   Oxygen Concentration (%) 35   SpO2 99 %   Pulse Oximetry Type Continuous   $ Pulse Oximetry - Multiple Charge Pulse Oximetry - Multiple   Pulse (!) 54        Airway - Non-Surgical 06/24/24 1700 Endotracheal Tube   Placement Date/Time: 06/24/24 1700   Method of Intubation: Video Laryngoscopy  Inserted by: MD  Airway Device: Endotracheal Tube  Airway Device Size: 7.5  Style: Cuffed  Cuffed: Cuffed  Cuff Inflation: Minimal occlusive pressure  Cuff Inflated With: A...   Secured at 23 cm   Measured At Lips   Secured Location Left   Secured by Commercial tube quezada   Status Intact;Secured;Patent   Airway Safety   Is Ambu Bag and Mask with Patient? Yes, Adult Ambu Bag and Mask   Suction set is at the bedside? Yes   Respiratory Interventions   Airway/Ventilation Management airway patency maintained   Vent Select   Conventional Vent Y   Charged w/in last 24h YES   Preset Conventional Ventilator Settings   Vent ID [id number]   Vent Type    Ventilation Type VC   Vent Mode A/C   Humidity HME   Set Rate 22 BPM   Vt Set 400 mL   PEEP/CPAP 6.1 cmH20   Waveform RAMP   Peak Flow 60 L/min   Trigger Sensitivity Flow/I-Trigger 3 L/min   Patient Ventilator Parameters    Resp Rate Total 22 br/min   Peak Airway Pressure 25 cmH20   Mean Airway Pressure 11 cmH20   Exhaled Vt 410 mL   Total Ve 9.02 L/m   I:E Ratio Measured 1:2.8   Tubing ID (mm) 7.5 mm   Tube Type ET   Conventional Ventilator Alarms   Alarms On Y   Ve High Alarm 25 L/min   Ve Low Alarm 5 L/min   Resp Rate High Alarm 40 br/min   Apnea Rate 14   Apnea Volume (mL) 0 mL   Apnea Oxygen Concentration  100   Apnea Flow Rate (L/min) 60   T Apnea 20 sec(s)   Ready to Wean/Extubation Screen   FIO2<=50 (chart decimal) 0.35   MV<16L (chart vol.) 9.02   PEEP <=8 (chart #) 6.1   Education   $ Education Suction;Ventilator Oxygen;15 min

## 2024-06-26 NOTE — ASSESSMENT & PLAN NOTE
Stable  Continue with medical management  + troponin . Unsure arrest secondary to cardiac cause . H/o a fib noted  Heparin drip was on hold for thrombocytopenia  Amiodarone also on hold

## 2024-06-26 NOTE — PROGRESS NOTES
3000 ml net uf removed   06/26/24 1315   Required for all Hemodialysis Patients   Hepatitis Status negative   Handoff Report   Received From [facility]   Given To Maggie   Treatment Type   Treatment Type Acute   Vital Signs   Temp 97.7 °F (36.5 °C)   Temp Source Axillary   Pulse 79   Resp (!) 30   SpO2 96 %   Oxygen Concentration (%) 30   BP (!) 180/82   MAP (mmHg) 118   Assessments (Pre/Post)   Level of Consciousness (AVPU) responds to pain   Pain   Preferred Pain Scale rFLACC (Revised Face Legs Arms Cry Consolability Scale)   Comfort/Acceptable Pain Level 0   Pain Rating (0-10): Rest 0   Pain Rating (0-10): Activity 0   FACES Pain Rating: Rest 0-->no hurt   rFLACC Pain Rating: - Face 0-->no particular expression or smile   rFLACC Pain Rating: - Legs 0-->normal position or relaxed   rFLACC Pain Rating: - Activity 0-->lying quietly, normal position, moves easily   rFLACC Pain Rating: - Cry 0-->no cry (awake or asleep)   rFLACC Pain Rating: - Consolability 0-->content, relaxed   rFLACC Score: 0   Pain Management Interventions quiet environment facilitated   Pre-Hemodialysis Assessment   Patient Status Departed        Hemodialysis Catheter 06/22/24 1130 left internal jugular   Placement Date/Time: 06/22/24 1130   Location: left internal jugular  Placement Verification: X-ray   Line Necessity Review CRRT/HD   Site Assessment Pink  (Simultaneous filing. User may not have seen previous data.)   Line Securement Device Secured with sutures   Dressing Type CHG impregnated dressing/sponge;Central line dressing   Dressing Status Old drainage;Intact  (Simultaneous filing. User may not have seen previous data.)   Dressing Intervention Integrity maintained  (Simultaneous filing. User may not have seen previous data.)   Date on Dressing 06/26/24   Dressing Due to be Changed 07/01/24   Venous Patency/Care flushed w/o difficulty;normal saline locked;deaccessed   Arterial Patency/Care flushed w/o difficulty;normal saline  locked;deaccessed   Post-Hemodialysis Assessment   Rinseback Volume (mL) 250 mL   Blood Volume Processed (Liters) 47.6 L   Dialyzer Clearance Lightly streaked   Duration of Treatment 180 minutes   Additional Fluid Intake (mL) 500 mL   Total UF (mL) 3500 mL   Net Fluid Removal 300   Patient Response to Treatment tolerated wellk   Post-Treatment Weight 143.6 kg (316 lb 9.3 oz)   Treatment Weight Change -3.2   Post-Hemodialysis Comments tx completed   Edema   Edema generalized     Unable to educate do intubation

## 2024-06-26 NOTE — PROGRESS NOTES
[facility] Medicine  Progress Note    Patient Name: Juhi Taylor  MRN: [id number]  Patient Class: IP- Inpatient   Admission Date: 6/18/2024  Length of Stay: 7 days  Attending Physician: Manuel Christensen MD  Primary Care Provider: Tony Sadler MD        Subjective:     Principal Problem:Chronic kidney disease (CKD), stage V        HPI:  73 year old pt getting admitted with fluid overload in need of dialysis  Pt had PD catheter insertion 2 weeks ago  She was supposed to start on PD for ESRD but got delayed due to insurance issues  She has oliguria/increased weight gain in past 2 weeks  Also SOB on exertion and cough  Symptoms got worse and she came to ER and got admitted  Pt was started on lasix gtt iv         Overview/Hospital Course:  Patient is a 73-year-old female who was admitted for acute hypoxic respiratory failure likely multifactorial from CKD and CHF .  Patient had PD cath placed 2 weeks ago however due to insurance situation never received dialysis. EF 40 -45% with elevated BNP. Patient was placed on Furosemide and metolazone with poor urine output eventually leading to dialysis.Nephrology is following. Discontinued antibiotics since infection was ruled out.     Interval History:     Patient was seen and examined and discussed with the family.  Patient had spontaneous breathing trial this morning and continue neurological response  Acute drop in platelet noted possible dialysis versus heparin induced    Review of Systems  Objective:     Vital Signs (Most Recent):  Temp: 97.7 °F (36.5 °C) (06/26/24 1315)  Pulse: 72 (06/26/24 1330)  Resp: (!) 50 (06/26/24 1330)  BP: (!) 184/83 (06/26/24 1330)  SpO2: 95 % (06/26/24 1330) Vital Signs (24h Range):  Temp:  [97.4 °F (36.3 °C)-98.4 °F (36.9 °C)] 97.7 °F (36.5 °C)  Pulse:  [50-83] 72  Resp:  [11-56] 50  SpO2:  [95 %-100 %] 95 %  BP: (112-193)/() 184/83     Weight: (!) 147.9 kg (326 lb 1 oz)  Body mass index is 51.06  kg/m².    Intake/Output Summary (Last 24 hours) at 6/26/2024 1353  Last data filed at 6/26/2024 1315  Gross per 24 hour   Intake 1826.07 ml   Output 6565 ml   Net -4738.93 ml         Physical Exam  Vitals and nursing note reviewed.   Constitutional:       Comments: sedated   HENT:      Head: Normocephalic and atraumatic.   Eyes:      Conjunctiva/sclera: Conjunctivae normal.   Neck:      Vascular: No JVD.   Cardiovascular:      Rate and Rhythm: Normal rate.      Heart sounds: Normal heart sounds.   Pulmonary:      Effort: Pulmonary effort is normal.      Breath sounds: Normal breath sounds.   Abdominal:      Palpations: Abdomen is soft.   Genitourinary:     General: Normal vulva.   Skin:     General: Skin is warm.   Neurological:      Comments: intubated   Psychiatric:         Mood and Affect: Mood normal.             Significant Labs: All pertinent labs within the past 24 hours have been reviewed.  BMP:   Recent Labs   Lab 06/26/24  0302   GLU 79      K 4.8      CO2 21*   BUN 63*   CREATININE 4.2*   CALCIUM 8.7   MG 2.3     CBC:   Recent Labs   Lab 06/24/24 2016 06/25/24  0321 06/25/24  0519 06/26/24  0302 06/26/24  0411   WBC 11.11 8.35  --  8.03  --    HGB 11.0* 10.5*  --  11.6*  --    HCT 37.5 34.1* 41 39.1 40    153  --  87*  --      CMP:   Recent Labs   Lab 06/24/24 2016 06/25/24  0321 06/26/24  0302    136 137   K 4.1 3.9 4.8    103 104   CO2 22* 23 21*   * 104 79   BUN 85* 91* 63*   CREATININE 4.8* 5.1* 4.2*   CALCIUM 8.6* 8.4* 8.7   PROT 5.3*  --   --    ALBUMIN 2.8*  --   --    BILITOT 0.7  --   --    ALKPHOS 84  --   --    AST 19  --   --    ALT 11  --   --    ANIONGAP 11 10 12       Significant Imaging: I have reviewed all pertinent imaging results/findings within the past 24 hours.    Assessment/Plan:      * Chronic kidney disease (CKD), stage V  Patient had PD cath placed 2 weeks ago however due to insurance situation never received dialysis  Tunneled dialysis  "catheter was placed 6/22  Revision 6/23  Patient has persistent oozing from dialysis catheter yesterday and DDAVP given and no stopped     Cardiac arrest  Rhythm PEA. No bradycardia   Possible cardiac cause . Consult cardiology   Cannot give blood thinners. Oozing from tunnel catheter  and thrombocytopenia  Cardiology follow up       CHF (congestive heart failure)  EF 25% with elevated BNP  Will need lifevest and AICD as OP   S/p tunnel line and nephrology will do HD daily for now   Amiodarone was on hold for bradycardia  Heparin drip was on hold for thrombocytopenia    Morbid obesity  Body mass index is 51.06 kg/m². Morbid obesity complicates all aspects of disease management from diagnostic modalities to treatment.     Acute hypoxic respiratory failure  Patient has severe fluid overload secondary to ESRD, cardiac arrest in HD hemodialysis  Likely multifactorial from CHF with worsening CKD  EF 40-45%  in previous ECHO but repeat with 25% with elevated BNP  HD daily for now as per nephrology   Status post tunnel line 6/22  Nephrology is following  Intubated with cardiac arrest 6/24 and possible extubation today       Renal cell carcinoma of right kidney  Aware       Paroxysmal atrial fibrillation  Chronic stable issue  Continue carvedilol, Holding home apixaban/hepain drip  for now  No more oozing from HD tunnel catheter      Type 2 diabetes mellitus with hyperglycemia  Patient's FSGs are controlled on current medication regimen.  Last A1c reviewed-   Lab Results   Component Value Date    HGBA1C 6.8 (H) 06/19/2024     Most recent fingerstick glucose reviewed- No results for input(s): "POCTGLUCOSE" in the last 24 hours.  Current correctional scale  Medium  Maintain anti-hyperglycemic dose as follows-   Antihyperglycemics (From admission, onward)      None          Hold Oral hypoglycemics while patient is in the hospital.    Essential hypertension  Chronic, controlled. Latest blood pressure and vitals reviewed- "     Temp:  [97.4 °F (36.3 °C)-98.4 °F (36.9 °C)]   Pulse:  [50-83]   Resp:  [11-56]   BP: (112-193)/()   SpO2:  [95 %-100 %] .   Home meds for hypertension were reviewed and noted below.   Hypertension Medications               carvediloL (COREG) 25 MG tablet Take 1 tablet (25 mg total) by mouth 2 (two) times daily.    cloNIDine (CATAPRES) 0.1 MG tablet Take 1 tablet (0.1 mg total) by mouth 3 (three) times daily as needed (PRN SBP > 165 mmHg).    cloNIDine 0.1 mg/24 hr td ptwk (CATAPRES) 0.1 mg/24 hr Place 1 patch onto the skin every 7 days.    furosemide (LASIX) 40 MG tablet Take 40 mg by mouth once daily.    amLODIPine (NORVASC) 10 MG tablet Take 10 mg by mouth once daily.    nitroGLYCERIN (NITROSTAT) 0.4 MG SL tablet Place 1 tablet (0.4 mg total) under the tongue every 5 (five) minutes as needed for Chest pain.            While in the hospital, will manage blood pressure as follows; Continue home antihypertensive regimen    Will utilize p.r.n. blood pressure medication only if patient's blood pressure greater than 140/90 and she develops symptoms such as worsening chest pain or shortness of breath.    Coronary artery disease  Stable  Continue with medical management  + troponin . Unsure arrest secondary to cardiac cause . H/o a fib noted  Heparin drip was on hold for thrombocytopenia  Amiodarone also on hold      VTE Risk Mitigation (From admission, onward)           Ordered     IP VTE HIGH RISK PATIENT  Once         06/18/24 1519     Place sequential compression device  Until discontinued         06/18/24 1519                    Discharge Planning   HARINI:      Code Status: Full Code   Is the patient medically ready for discharge?:     Reason for patient still in hospital (select all that apply): Treatment  Discharge Plan A: Rehab            Critical care time spent on the evaluation and treatment of severe organ dysfunction, review of pertinent labs and imaging studies, discussions with consulting providers  and discussions with patient/family: 33 minutes.      Manuel Christensen MD  Department of Hospital Medicine   [facility]

## 2024-06-26 NOTE — ASSESSMENT & PLAN NOTE
Patient had PD cath placed 2 weeks ago however due to insurance situation never received dialysis  Tunneled dialysis catheter was placed 6/22  Revision 6/23  Patient has persistent oozing from dialysis catheter yesterday and DDAVP given and no stopped

## 2024-06-26 NOTE — PROGRESS NOTES
"[facility]  Adult Nutrition   Progress Note (Initial Assessment)     SUMMARY     Recommendations  Continue current Renal Fluid - 1200mL diet as tolerated.    Goals:   Pt to continue to meet 50% or more of her EEN/EPN.    Nutrition Goal Status: goal met    Communication of RD Recs: other (comment)    Nutrition Diagnosis PES Statement: No nutrition diagnosis at this time.    Dietitian Rounds Brief  Assessed due to being at risk: Pt is eating 75% or more of her renal diet. She does have some skin issues that have been documented. Will continue to follow biweekly and prn.    Nutrition Related Social Determinants of Health: SDOH: Adequate food in home environment    Diet order:   Current Diet Order: Renal Fluid - 1200mL      Evaluation of Received Nutrient/Fluid Intake  Energy Calories Required: meeting needs  Protein Required: meeting needs  Fluid Required: meeting needs  Tolerance: tolerating     % Intake of Estimated Energy Needs: 75 - 100 %  % Meal Intake: 75 - 100 %      Intake/Output Summary (Last 24 hours) at 6/26/2024 1429  Last data filed at 6/26/2024 1315  Gross per 24 hour   Intake 1826.07 ml   Output 6565 ml   Net -4738.93 ml        Anthropometrics  Temp: 97.7 °F (36.5 °C)  Height Method: Stated  Height: 5' 7" (170.2 cm)  Height (inches): 67 in  Weight Method: Bed Scale  Weight: (!) 147.9 kg (326 lb 1 oz)  Weight (lb): (!) 326.06 lb  Ideal Body Weight (IBW), Female: 135 lb  % Ideal Body Weight, Female (lb): 240.55 %  BMI (Calculated): 51.1  BMI Grade: greater than 40 - morbid obesity       Estimated/Assessed Needs  Weight Used For Calorie Calculations: (!) 147.9 kg (326 lb 1 oz)  Energy Calorie Requirements (kcal): 9314-9608 kcals/day (15-20 kcals/kg ABW)  Energy Need Method: Kcal/kg  Protein Requirements:  g/day (1.5-2.0 g/kg IBW)  Weight Used For Protein Calculations: 61 kg (134 lb 7.7 oz)     Estimated Fluid Requirement Method: RDA Method  RDA Method (mL): 2219  CHO Requirement: " 277-370 g CHO/day (less to control diabetes)    Reason for Assessment  Reason For Assessment: identified at risk by screening criteria  Diagnosis: other (see comments) (Chronic kidney disease (CKD), stage V)  Relevant Medical History: Coronary artery disease (Chronic), Essential hypertension, Type 2 diabetes mellitus with hyperglycemia, Paroxysmal atrial fibrillation (Chronic), Renal cell carcinoma of right kidney, Acute hypoxic respiratory failure, Morbid obesity, CHF (congestive heart failure), Cardiac arrest  Interdisciplinary Rounds: did not attend  Nutrition Discharge Planning: Renal Fluid - 1200mL    Nutrition/Diet History  Spiritual, Cultural Beliefs, Nondenominational Practices, Values that Affect Care: no  Food Allergies: NKFA  Factors Affecting Nutritional Intake: None identified at this time    Nutrition Risk Screen  Nutrition Risk Screen: no indicators present     MST Score: 0  Have you recently lost weight without trying?: No  Weight loss score: 0  Have you been eating poorly because of a decreased appetite?: No  Appetite score: 0       Weight History:  Wt Readings from Last 10 Encounters:   06/22/24 (!) 147.9 kg (326 lb 1 oz)   06/25/24 (!) 147.9 kg (326 lb)   06/19/24 132.5 kg (292 lb 1.8 oz)   06/04/24 134.3 kg (296 lb)   05/27/24 134.3 kg (296 lb)   04/01/24 131.7 kg (290 lb 4.8 oz)   03/27/24 132 kg (291 lb 0.1 oz)   03/17/24 (!) 137.1 kg (302 lb 3.2 oz)   01/31/24 131.9 kg (290 lb 12.6 oz)   11/15/23 130.7 kg (288 lb 4 oz)        Lab/Procedures/Meds: Pertinent Labs/Meds Reviewed    Medications:Pertinent Medications Reviewed  Scheduled Meds:   cefTRIAXone (Rocephin) IV (PEDS and ADULTS)  1 g Intravenous Q24H    epoetin alaina-epbx  50 Units/kg Intravenous Every Mon, Wed, Fri    famotidine  20 mg Oral Daily    metoprolol tartrate  12.5 mg Oral BID    vancomycin (VANCOCIN) IV (PEDS and ADULTS)  500 mg Intravenous Once     Continuous Infusions:   amiodarone in dextrose 5%  1 mg/min Intravenous Continuous    Stopped at 06/24/24 2010    dexmedeTOMIDine (Precedex) infusion (titrating)  0-1.4 mcg/kg/hr Intravenous Continuous 22.19 mL/hr at 06/26/24 1245 0.6 mcg/kg/hr at 06/26/24 1245    NORepinephrine bitartrate-D5W  0-3 mcg/kg/min Intravenous Continuous   Stopped at 06/24/24 2215    propofoL  0-50 mcg/kg/min Intravenous Continuous   Stopped at 06/26/24 1049     PRN Meds:.  Current Facility-Administered Medications:     acetaminophen, 650 mg, Oral, Q8H PRN    aluminum-magnesium hydroxide-simethicone, 30 mL, Oral, QID PRN    dextrose 50%, 12.5 g, Intravenous, PRN    dextrose 50%, 25 g, Intravenous, PRN    glucagon (human recombinant), 1 mg, Intramuscular, PRN    glucose, 16 g, Oral, PRN    glucose, 24 g, Oral, PRN    melatonin, 6 mg, Oral, Nightly PRN    ondansetron, 4 mg, Intravenous, Q6H PRN    ondansetron, 4 mg, Intravenous, Daily PRN    Pharmacy to dose Vancomycin consult, , , Once **AND** vancomycin - pharmacy to dose, , Intravenous, pharmacy to manage frequency    Labs: Pertinent Labs Reviewed  Clinical Chemistry:  Recent Labs   Lab 06/22/24  0526 06/23/24  0442 06/24/24  0543 06/24/24 2016 06/25/24  0321 06/26/24  0302    137 137 136   < > 137   K 4.4 5.3* 5.4* 4.1   < > 4.8    106 107 103   < > 104   CO2 27 22* 22* 22*   < > 21*   * 124* 124* 211*   < > 79   * 90* 107* 85*   < > 63*   CREATININE 5.1* 4.6* 5.5* 4.8*   < > 4.2*   CALCIUM 9.3 9.1 9.0 8.6*   < > 8.7   PROT 6.1 5.8*  --  5.3*  --   --    ALBUMIN 3.2* 3.1*  --  2.8*  --   --    BILITOT 0.6 0.7  --  0.7  --   --    ALKPHOS 84 85  --  84  --   --    AST 11 16  --  19  --   --    ALT 13 14  --  11  --   --    ANIONGAP 9 9 8 11   < > 12   MG 2.7* 2.4 2.6  --    < > 2.3   PHOS  --   --  6.0*  --   --   --     < > = values in this interval not displayed.     CBC:   Recent Labs   Lab 06/26/24  0302 06/26/24  0411   WBC 8.03  --    RBC 4.57  --    HGB 11.6*  --    HCT 39.1 40   PLT 87*  --    MCV 84  --    MCH 25.4*  --    MCHC 29.7*   --        Cardiac Profile:  Recent Labs   Lab 06/20/24  0402   BNP 1,726*       Monitor and Evaluation  Food and Nutrient Intake: food and beverage intake, energy intake  Food and Nutrient Adminstration: diet order  Knowledge/Beliefs/Attitudes: food and nutrition knowledge/skill, beliefs and attitudes  Physical Activity and Function: nutrition-related ADLs and IADLs, factors affecting access to physical activity  Anthropometric Measurements: weight, weight change, body mass index  Biochemical Data, Medical Tests and Procedures: lipid profile, inflammatory profile, glucose/endocrine profile, gastrointestinal profile, electrolyte and renal panel  Nutrition-Focused Physical Findings: overall appearance     Nutrition Risk  Level of Risk/Frequency of Follow-up: high     Nutrition Follow-Up  RD Follow-up?: Yes      Laura Marrufo RD 06/26/2024 2:29 PM

## 2024-06-26 NOTE — ASSESSMENT & PLAN NOTE
Body mass index is 51.06 kg/m². Morbid obesity complicates all aspects of disease management from diagnostic modalities to treatment.

## 2024-06-26 NOTE — ASSESSMENT & PLAN NOTE
EF 25% with elevated BNP  Will need lifevest and AICD as OP   S/p tunnel line and nephrology will do HD daily for now   Amiodarone was on hold for bradycardia  Heparin drip was on hold for thrombocytopenia

## 2024-06-26 NOTE — ASSESSMENT & PLAN NOTE
Patient has severe fluid overload secondary to ESRD, cardiac arrest in HD hemodialysis  Likely multifactorial from CHF with worsening CKD  EF 40-45%  in previous ECHO but repeat with 25% with elevated BNP  HD daily for now as per nephrology   Status post tunnel line 6/22  Nephrology is following  Intubated with cardiac arrest 6/24 and possible extubation today

## 2024-06-26 NOTE — ASSESSMENT & PLAN NOTE
Rhythm PEA. No bradycardia   Possible cardiac cause . Consult cardiology   Cannot give blood thinners. Oozing from tunnel catheter  and thrombocytopenia  Cardiology follow up

## 2024-06-26 NOTE — PROGRESS NOTES
Pharmacokinetic Assessment Follow Up: IV Vancomycin    Vancomycin serum concentration assessment(s):    The random level was drawn correctly and can be used to guide therapy at this time. The measurement is within the desired definitive target range of 10 to 15 mcg/mL.    Vancomycin Regimen Plan:    Give Vancomycin 500 mg once and Re-dose when the random level is less than 15 mcg/mL, next level to be drawn at 0430 on 06/28    Drug levels (last 3 results):  Recent Labs   Lab Result Units 06/26/24  0302   Vancomycin, Random ug/mL 12.4       Pharmacy will continue to follow and monitor vancomycin.    Please contact pharmacy at extension [phone] for questions regarding this assessment.    Thank you for the consult,   John Salter       Patient brief summary:  Juhi Taylor is a 74 y.o. female initiated on antimicrobial therapy with IV Vancomycin for treatment of lower respiratory infection    Drug Allergies:   Review of patient's allergies indicates:   Allergen Reactions    Percocet [oxycodone-acetaminophen] Itching    Amiodarone analogues      Itching      Irbesartan Swelling    Januvia [sitagliptin]     Jardiance [empagliflozin]      Leg cramps    Lipitor [atorvastatin] Other (See Comments)     Severe leg pain    Linaclotide Other (See Comments) and Nausea And Vomiting     Does not remember    Lubiprostone Other (See Comments) and Palpitations     Does not remember       Actual Body Weight:   147.9 kg    Renal Function:   Estimated Creatinine Clearance: 18.4 mL/min (A) (based on SCr of 4.2 mg/dL (H)).,     CBC (last 72 hours):  Recent Labs   Lab Result Units 06/23/24  0442 06/24/24  0543 06/24/24 2016 06/25/24  0321 06/26/24  0302   WBC K/uL 8.61 8.76 11.11 8.35 8.03   Hemoglobin g/dL 12.1 11.8* 11.0* 10.5* 11.6*   Hematocrit % 43.2 41.6 37.5 34.1* 39.1   Platelets K/uL 193 188 170 153 87*   Gran % % 72.8 65.1 75.0* 61.8 64.7   Lymph % % 16.6* 21.9 13.7* 24.4 20.8   Mono % % 8.4 10.2 9.4 10.3 10.3   Eosinophil % %  1.7 1.8 1.2 2.6 3.6   Basophil % % 0.2 0.2 0.2 0.2 0.4   Differential Method  Automated Automated Automated Automated Automated       Metabolic Panel (last 72 hours):  Recent Labs   Lab Result Units 06/23/24  0442 06/24/24  0543 06/24/24 2016 06/25/24  0321 06/26/24  0302   Sodium mmol/L 137 137 136 136 137   Potassium mmol/L 5.3* 5.4* 4.1 3.9 4.8   Chloride mmol/L 106 107 103 103 104   CO2 mmol/L 22* 22* 22* 23 21*   Glucose mg/dL 124* 124* 211* 104 79   BUN mg/dL 90* 107* 85* 91* 63*   Creatinine mg/dL 4.6* 5.5* 4.8* 5.1* 4.2*   Albumin g/dL 3.1*  --  2.8*  --   --    Total Bilirubin mg/dL 0.7  --  0.7  --   --    Alkaline Phosphatase U/L 85  --  84  --   --    AST U/L 16  --  19  --   --    ALT U/L 14  --  11  --   --    Magnesium mg/dL 2.4 2.6  --  2.4 2.3   Phosphorus mg/dL  --  6.0*  --   --   --        Vancomycin Administrations:  vancomycin given in the last 96 hours                     vancomycin (VANCOCIN) 2,000 mg in D5W 500 mL IVPB (mg) 2,000 mg New Bag 06/24/24 2045                    Microbiologic Results:  Microbiology Results (last 7 days)       Procedure Component Value Units Date/Time    Blood culture [[id number]] Collected: 06/24/24 2016    Order Status: Completed Specimen: Blood from Peripheral, Hand, Right Updated: 06/25/24 2232     Blood Culture, Routine No Growth to date      No Growth to date    Culture, Respiratory with Gram Stain [[id number]] Collected: 06/24/24 1848    Order Status: Completed Specimen: Respiratory from Sputum, Expectorated Updated: 06/25/24 0721     Respiratory Culture No Growth     Gram Stain (Respiratory) <10 epithelial cells per low power field.     Gram Stain (Respiratory) Few WBC's     Gram Stain (Respiratory) No organisms seen

## 2024-06-26 NOTE — PROGRESS NOTES
Nephrology Progress Note        Patient Name: Juhi Taylor  MRN: [id number]    Patient Class: IP- Inpatient   Admission Date: 6/18/2024  Length of Stay: 7 days  Date of Service: 6/26/2024    Attending Physician: Manuel Christensen MD  Primary Care Provider: Tony Sadler MD    Reason for Consult: josue    SUBJECTIVE:     HPI: 73F fallowed with Dr. Martinez from nephrology for advanced CKD stage 4-5 getting admitted with fluid overload. Pt had PD catheter insertion 2 weeks ago in preparation for initiation of dialysis, however had post-operative issues with increased leakage. Then her PD in initiation and training was delayed due to insurance issues. She has oliguria/increased weight gain in past 2 weeks, SOB on exertion and cough. Symptoms got worse and she came to ER and got admitted.     I saw her in the ER and recommend trial of lasix gtt, cassia. Will consult Dr. Catalan to evaluate patient and make recommendation for catheter care - we probably will not be able to use it for now for dialysis.    6/19 VSS. Only 175cc UO documented, but may be inaccurately charted - pt boarding in ER. Will increase lasix gtt to 10mg/hr and ensure at least q shift UO documentation.    6/20 VSS. BP low, stop Clonidine patch. Increase lasix gtt to 15mg/hr, add metolazone. Stop Fluconazole in 1-2 days after stopping IV abx for suspected PNA. Appreciate Surgery input - will d/w patient initiation of HD tomorrow if unable to produce much UO and symptomatic improvement...    6/21 VSS. Will discuss initiation of dialysis via HD, she is not responding to diuretics.    6/22 VSS. Plan for HD today after dialysis access placement. Seen on HD, BF around 275 cc/min max, otherwise tolerating well. Will have to be repositioned in cath lab by Dr. Griffin on Monday.    6/23 VSS. Getting HD cath repositioned today, plan HD tomorrow.    6/24 VSS, on 2-3L NC, first HD 6/22, second HD today    6/25 105 min into HD treatment, patient  became lethargic acutely (was conversing/calm)- gave NS bolus- went into PEA arrest- had ACLS with ROSC- net UF 750cc, intubated and transferred to ICU, bradycardic, SBP 110s, FIO2 35%, UOP 100cc, required amio and levophed but now off- mld rise in trop noted, lactic acid normal, CXR clear, echo pending    6/26 tolerated HD yest- got off 2L- echo with EF 20-25% with grade III DD which is much worse compared to < 1 week ago with EF 40-45% suggesting cardiac event- trop peaked- per cards plan for life vest and may need LHC- HR 50s, -160s, FIO2 30%, UOP 550cc  - seen on HD  - bleeding at TDC exit site is better    ASSESSMENT/PLAN:     PEA arrest during dialysis 6/24  - echo shows newly depressed EF supportive of a cardiac event- could have been precipitated by myocardial stress during dialysis- don't feel that trop rise is c/w an acute NSTEMI- will f/u cardiology recs    CKD stage 5- uremia, hyperkalemia, oliguria  PD cathether in place  Initiated on RRT via IHD this admission using TDC  - plan for daily HD treatments for clearance/UF  - renal diet, 1.5L fluid restriction  - will need outpatient HD unit placement- plan for discharge to [facility] in interim  - stitched placed to TDC last night which helped- applied surgicele to bleeding exit site of TDC yest and today  - dose meds for CrCl < 10/HD    Anemia of CKD  - continue PONCE with HD    MBD / Secondary HPT  - phos is elevated- do dialysis- renal diet- may need phos binder  - last PTH acceptable    Baseline HTN  Volume overload/S CHF exacerbation  - still very edematous  - push UF with HD    Updated family     Thank you for allowing us to participate in the care of your patient!   We will follow the patient and provide recommendations as needed.         Laboratory:  Recent Labs   Lab 06/24/24  2016 06/25/24  0321 06/26/24  0302    136 137   K 4.1 3.9 4.8    103 104   CO2 22* 23 21*   BUN 85* 91* 63*   CREATININE 4.8* 5.1* 4.2*   * 104 79  "      Recent Labs   Lab 06/22/24  0526 06/23/24 0442 06/24/24  0543 06/24/24 2016 06/25/24 0321 06/26/24  0302   CALCIUM 9.3 9.1 9.0 8.6* 8.4* 8.7   ALBUMIN 3.2* 3.1*  --  2.8*  --   --    PHOS  --   --  6.0*  --   --   --    MG 2.7* 2.4 2.6  --  2.4 2.3       Recent Labs   Lab 10/10/23  1113 01/22/24  1133 05/08/24  1139   PTH, Intact 583.1 H 506.7 H 291.7 H       No results for input(s): "POCTGLUCOSE" in the last 168 hours.    Recent Labs   Lab 07/05/23  1533 01/22/24  1133 06/19/24  0443   Hemoglobin A1C 5.6 5.4 6.8 H       Recent Labs   Lab 06/24/24 2016 06/25/24 0321 06/25/24 0519 06/26/24 0302 06/26/24  0411   WBC 11.11 8.35  --  8.03  --    HGB 11.0* 10.5*  --  11.6*  --    HCT 37.5 34.1* 41 39.1 40    153  --  87*  --    MCV 86 81*  --  84  --    MCHC 29.3* 30.8*  --  29.7*  --    MONO 9.4  1.0 10.3  0.9  --  10.3  0.8  --    EOSINOPHIL 1.2 2.6  --  3.6  --        Recent Labs   Lab 06/22/24 0526 06/23/24 0442 06/24/24 2016   BILITOT 0.6 0.7 0.7   PROT 6.1 5.8* 5.3*   ALBUMIN 3.2* 3.1* 2.8*   ALKPHOS 84 85 84   ALT 13 14 11   AST 11 16 19       Recent Labs   Lab 03/17/22 2240 04/07/22  1536 04/17/23  1448 07/14/23 2026 03/14/24  2254 05/09/24  1029 06/23/24  1816   Color, UA Colorless A   < > Colorless A  --  Yellow Yellow  --    Appearance, UA Clear   < > Clear  --  Clear Clear  --    pH, UA 5.0   < > 6.0  --  6.0 6.0  --    Specific Vance, UA 1.010   < > 1.010  --  1.010 1.015  --    Protein, UA Negative   < > 1+ A  --  1+ A 1+ A  --    Glucose, UA Negative   < > Negative  --  Negative Negative  --    Ketones, UA Negative   < > Negative  --  Negative Negative  --    Urobilinogen, UA Negative  --   --   --  Negative  --   --    Bilirubin (UA) Negative   < > Negative  --  Negative Negative  --    Occult Blood UA Negative   < > Negative  --  TRACE Negative  --    Nitrite, UA Negative   < > Negative  --  Negative Negative  --    RBC, UA  --    < > 3   < > 3 1 >100 H   WBC, UA  --    < > " 3   < > 5 1 >100 H   Bacteria  --    < > None   < > None Rare Many A   Hyaline Casts, UA  --    < > 0  --  0 0  --     < > = values in this interval not displayed.       Recent Labs   Lab 06/24/24  1836 06/25/24  0519 06/26/24  0411   POC PH 7.373 7.461 H 7.402   POC PCO2 41.2 32.9 L 37.2   POC HCO3 24.0 23.4 L 23.2 L   POC PO2 188 H 93 94   POC SATURATED O2 100 98 97   POC BE -1 0 -2   Sample ARTERIAL ARTERIAL ARTERIAL       Microbiology Results (last 7 days)       Procedure Component Value Units Date/Time    Blood culture [[id number]] Collected: 06/24/24 2016    Order Status: Completed Specimen: Blood from Peripheral, Hand, Right Updated: 06/25/24 2232     Blood Culture, Routine No Growth to date      No Growth to date    Culture, Respiratory with Gram Stain [[id number]] Collected: 06/24/24 1848    Order Status: Completed Specimen: Respiratory from Sputum, Expectorated Updated: 06/25/24 0721     Respiratory Culture No Growth     Gram Stain (Respiratory) <10 epithelial cells per low power field.     Gram Stain (Respiratory) Few WBC's     Gram Stain (Respiratory) No organisms seen            Review of patient's allergies indicates:   Allergen Reactions    Percocet [oxycodone-acetaminophen] Itching    Amiodarone analogues      Itching      Irbesartan Swelling    Januvia [sitagliptin]     Jardiance [empagliflozin]      Leg cramps    Lipitor [atorvastatin] Other (See Comments)     Severe leg pain    Linaclotide Other (See Comments) and Nausea And Vomiting     Does not remember    Lubiprostone Other (See Comments) and Palpitations     Does not remember       Outpatient meds:  No current facility-administered medications on file prior to encounter.     Current Outpatient Medications on File Prior to Encounter   Medication Sig Dispense Refill    allopurinoL (ZYLOPRIM) 300 MG tablet Take 1 tablet (300 mg total) by mouth once daily. 90 tablet 3    amLODIPine (NORVASC) 10 MG tablet Take 10 mg by mouth once daily.       calcitRIOL (ROCALTROL) 0.5 MCG Cap Take 0.5 mcg by mouth once daily.      carvediloL (COREG) 25 MG tablet Take 1 tablet (25 mg total) by mouth 2 (two) times daily. 180 tablet 2    cyanocobalamin (VITAMIN B-12) 100 MCG tablet Take 100 mcg by mouth once daily.      evolocumab (REPATHA SURECLICK) 140 mg/mL PnIj Inject 1 mL (140 mg total) into the skin every 14 (fourteen) days. 2 mL 11    furosemide (LASIX) 40 MG tablet Take 40 mg by mouth once daily.      loratadine (CLARITIN) 10 mg tablet Take 10 mg by mouth once daily.      magnesium oxide (MAG-OX) 400 mg (241.3 mg magnesium) tablet Take 1 tablet (400 mg total) by mouth daily as needed (cramping). 30 tablet 0    blood sugar diagnostic (TRUE METRIX GLUCOSE TEST STRIP) Strp Use 1x daily. Insurance preferred. 100 strip 3    blood sugar diagnostic Strp To check BG 1 time daily, to use with insurance preferred meter 100 strip 3    cloNIDine (CATAPRES) 0.1 MG tablet Take 1 tablet (0.1 mg total) by mouth 3 (three) times daily as needed (PRN SBP > 165 mmHg). 90 tablet 6    cloNIDine 0.1 mg/24 hr td ptwk (CATAPRES) 0.1 mg/24 hr Place 1 patch onto the skin every 7 days. 4 patch 4    lancets Misc To check BG 1 times daily, to use with insurance preferred meter 100 each 3    nitroGLYCERIN (NITROSTAT) 0.4 MG SL tablet Place 1 tablet (0.4 mg total) under the tongue every 5 (five) minutes as needed for Chest pain. 25 tablet 0    [DISCONTINUED] aspirin (ECOTRIN) 81 MG EC tablet Take 1 tablet (81 mg total) by mouth once daily. 90 tablet 3    [DISCONTINUED] DULoxetine (CYMBALTA) 20 MG capsule TAKE ONE CAPSULE BY MOUTH ONCE DAILY 30 capsule 4    [DISCONTINUED] folic acid (FOLVITE) 1 MG tablet Take 1 mg by mouth once daily.      [DISCONTINUED] metFORMIN (GLUCOPHAGE-XR) 500 MG ER 24hr tablet Take 2 tablets (1,000 mg total) by mouth 2 (two) times daily with meals. 360 tablet 3    [DISCONTINUED] sodium bicarbonate 650 MG tablet Take 1 tablet (650 mg total) by mouth once daily. 30 tablet 11        Scheduled meds:   cefTRIAXone (Rocephin) IV (PEDS and ADULTS)  1 g Intravenous Q24H    desmopressin (DDAVP) 14.792 mcg in 0.9% NaCl 50 mL IVPB  0.1 mcg/kg Intravenous Once    epoetin alaina (PROCRIT) injection  50 Units/kg Intravenous Every Mon, Wed, Fri    famotidine  20 mg Oral Daily    vancomycin (VANCOCIN) IV (PEDS and ADULTS)  500 mg Intravenous Once       Infusions:   amiodarone in dextrose 5%  1 mg/min Intravenous Continuous   Stopped at 06/24/24 2010    NORepinephrine bitartrate-D5W  0-3 mcg/kg/min Intravenous Continuous   Stopped at 06/24/24 2215    propofoL  0-50 mcg/kg/min Intravenous Continuous 26.6 mL/hr at 06/26/24 0833 30 mcg/kg/min at 06/26/24 0833         PRN meds:    Current Facility-Administered Medications:     acetaminophen, 650 mg, Oral, Q8H PRN    aluminum-magnesium hydroxide-simethicone, 30 mL, Oral, QID PRN    dextrose 50%, 12.5 g, Intravenous, PRN    dextrose 50%, 25 g, Intravenous, PRN    glucagon (human recombinant), 1 mg, Intramuscular, PRN    glucose, 16 g, Oral, PRN    glucose, 24 g, Oral, PRN    melatonin, 6 mg, Oral, Nightly PRN    ondansetron, 4 mg, Intravenous, Q6H PRN    ondansetron, 4 mg, Intravenous, Daily PRN    Pharmacy to dose Vancomycin consult, , , Once **AND** vancomycin - pharmacy to dose, , Intravenous, pharmacy to manage frequency      OBJECTIVE:     Vital Signs and IO:  Temp:  [97.4 °F (36.3 °C)-98.4 °F (36.9 °C)]   Pulse:  [46-68]   Resp:  [11-28]   BP: (112-165)/(53-84)   SpO2:  [95 %-100 %]   I/O last 3 completed shifts:  In: 2213.9 [I.V.:963.9; Other:500; NG/GT:50; IV Piggyback:700]  Out: 3165 [Urine:665; Other:2500]  Wt Readings from Last 5 Encounters:   06/22/24 (!) 147.9 kg (326 lb 1 oz)   06/25/24 (!) 147.9 kg (326 lb)   06/19/24 132.5 kg (292 lb 1.8 oz)   06/04/24 134.3 kg (296 lb)   05/27/24 134.3 kg (296 lb)     Body mass index is 51.06 kg/m².    Physical Exam  Constitutional:       General: Patient is not in acute distress.     Appearance: Patient is  well-developed. She is not diaphoretic.   HENT:      Head: Normocephalic and atraumatic.      Mouth/Throat: Mucous membranes are moist.   Eyes:      General: No scleral icterus.     Pupils: Pupils are equal, round, and reactive to light.   Cardiovascular:      Rate and Rhythm: Normal rate and regular rhythm.   Pulmonary:      Effort: Pulmonary effort is normal. No respiratory distress.      Breath sounds: No stridor.   Abdominal:      General: There is no distension.      Palpations: Abdomen is soft.   Musculoskeletal:         General: No deformity. Normal range of motion.      Cervical back: Neck supple.   Skin:     General: Skin is warm and dry.      Findings: No rash present. No erythema.   Neurological:      Mental Status: Patient is alert and oriented to person, place, and time.      Cranial Nerves: No cranial nerve deficit.   Psychiatric:         Behavior: Behavior normal.          Patient care time was spent personally by me on the following activities: > 35 min    Obtaining a history.  Examination of patient.  Providing medical care at the patients bedside.  Developing a treatment plan with patient or surrogate and bedside caregivers.  Ordering and reviewing laboratory studies, radiographic studies, pulse oximetry.  Ordering and performing treatments and interventions.  Evaluation of patient's response to treatment.  Discussions with consultants while on the unit and immediately available to the patient.  Re-evaluation of the patient's condition.  Documentation in the medical record.     Hector Blakely MD    [facility]  [address] [place] [address]  JULIANNA Shah [address]    [phone] - tel  [phone] - fax    6/26/2024

## 2024-06-26 NOTE — ASSESSMENT & PLAN NOTE
Chronic, controlled. Latest blood pressure and vitals reviewed-     Temp:  [97.4 °F (36.3 °C)-98.4 °F (36.9 °C)]   Pulse:  [50-83]   Resp:  [11-56]   BP: (112-193)/()   SpO2:  [95 %-100 %] .   Home meds for hypertension were reviewed and noted below.   Hypertension Medications               carvediloL (COREG) 25 MG tablet Take 1 tablet (25 mg total) by mouth 2 (two) times daily.    cloNIDine (CATAPRES) 0.1 MG tablet Take 1 tablet (0.1 mg total) by mouth 3 (three) times daily as needed (PRN SBP > 165 mmHg).    cloNIDine 0.1 mg/24 hr td ptwk (CATAPRES) 0.1 mg/24 hr Place 1 patch onto the skin every 7 days.    furosemide (LASIX) 40 MG tablet Take 40 mg by mouth once daily.    amLODIPine (NORVASC) 10 MG tablet Take 10 mg by mouth once daily.    nitroGLYCERIN (NITROSTAT) 0.4 MG SL tablet Place 1 tablet (0.4 mg total) under the tongue every 5 (five) minutes as needed for Chest pain.            While in the hospital, will manage blood pressure as follows; Continue home antihypertensive regimen    Will utilize p.r.n. blood pressure medication only if patient's blood pressure greater than 140/90 and she develops symptoms such as worsening chest pain or shortness of breath.

## 2024-06-26 NOTE — PROGRESS NOTES
2000 ml net uf removed, tolerated well, cvc site cont. To bleed , Dr Blakely notified   06/25/24 1945   Required for all Hemodialysis Patients   Hepatitis Status negative   Handoff Report   Received From [facility] To [facility]   Treatment Type   Treatment Type Acute   Vital Signs   Temp 97.4 °F (36.3 °C)   Temp Source Oral   Pulse (!) 52   Resp 11   SpO2 99 %   Oxygen Concentration (%) 35   BP (!) 147/67   BP Location Right arm   BP Method Automatic   Patient Position Lying   Assessments (Pre/Post)   Date Hepatitis Profile Obtained 06/21/24   Blood Liters Processed (BLP) 61.5   Transport Modality bed   Level of Consciousness (AVPU) responds to pain   Dialyzer Clearance mildly streaked   Pain   Preferred Pain Scale rFLACC (Revised Face Legs Arms Cry Consolability Scale)   Pain Rating (0-10): Rest 0   Pre-Hemodialysis Assessment   Patient Status Departed        Hemodialysis Catheter 06/22/24 1130 left internal jugular   Placement Date/Time: 06/22/24 1130   Location: left internal jugular  Placement Verification: X-ray   Line Necessity Review CRRT/HD   Site Assessment Bleeding   Line Securement Device Secured with sutures   Dressing Type CHG impregnated dressing/sponge   Dressing Status Old drainage   Dressing Intervention Other (Comment)  (dressing reinforced)   Date on Dressing 06/25/24   Dressing Due to be Changed 06/26/24   Venous Patency/Care flushed w/o difficulty;normal saline locked;deaccessed   Arterial Patency/Care flushed w/o difficulty;normal saline locked;deaccessed   Post-Hemodialysis Assessment   Rinseback Volume (mL) 250 mL   Blood Volume Processed (Liters) 61.5 L   Dialyzer Clearance Lightly streaked   Duration of Treatment 240 minutes   Additional Fluid Intake (mL) 500 mL   Total UF (mL) 2500 mL   Net Fluid Removal 2000   Patient Response to Treatment tolerated well   Post-Treatment Weight 147 kg (324 lb 1.2 oz)   Treatment Weight Change -2   Post-Hemodialysis Comments tx completed   Edema   Edema  generalized     Unable to educate, intubated

## 2024-06-26 NOTE — PLAN OF CARE
Problem: Adult Inpatient Plan of Care  Goal: Plan of Care Review  Outcome: Progressing  Pt remains intuabated and sedated. SAT done today, Propofol paused approx 51 min prior to failed SBT. Pt able to move extremities and open eyes but does not follow commands. Became restless and not maintaining appropriate respiratory demands with RR in 30s. Increased secretions requiring frequent suctioning of mouth. Bleeding more controlled around dialysis catheter after stitch placed by Anesthesia. Damon intact with poor UO. Restraints remains in place, safety maintained, bed alarm set.

## 2024-06-26 NOTE — PLAN OF CARE
CM following for discharge planning.      06/26/24 1313   Discharge Reassessment   Assessment Type Discharge Planning Reassessment   Did the patient's condition or plan change since previous assessment? Yes   Communicated HARINI with patient/caregiver Yes   Discharge Plan A Rehab   Discharge Plan B Hospital Transfer   Why the patient remains in the hospital Requires continued medical care

## 2024-06-26 NOTE — PROGRESS NOTES
Pulmonary/Critical Care Progress Note      PATIENT NAME: Juhi Taylor  MRN: [id number]  TODAY'S DATE: 2024  5:28 PM  ADMIT DATE: 2024  AGE: 74 y.o. : 1950    CONSULT REQUESTED BY: Manuel Christensen MD    REASON FOR CONSULT:   Cardiac arrest     HPI:  Ms Taylor is a 73-year-old woman who presents with cardiac arrest.    Code blue was called on  evening, she was unresponsive.  Chest compressions were already underway.  Rhythm was consistent with PE a arrest initially, after giving epinephrine, the rhythm appeared to change to ventricular tachycardia.  We cardioverted and patient achieved ROSC.  Additionallly she received bicarbonate and glucose.     Today:   CTH over night was neg  Opened eyes with SAT today but no follow commands.   Reattempted SAT again tdoay -     Review of Systems   Unable to perform ROS: Intubated           ALLERGIES  Review of patient's allergies indicates:   Allergen Reactions    Percocet [oxycodone-acetaminophen] Itching    Amiodarone analogues      Itching      Irbesartan Swelling    Januvia [sitagliptin]     Jardiance [empagliflozin]      Leg cramps    Lipitor [atorvastatin] Other (See Comments)     Severe leg pain    Linaclotide Other (See Comments) and Nausea And Vomiting     Does not remember    Lubiprostone Other (See Comments) and Palpitations     Does not remember       INPATIENT SCHEDULED MEDICATIONS   cefTRIAXone (Rocephin) IV (PEDS and ADULTS)  1 g Intravenous Q24H    epoetin alaina (PROCRIT) injection  50 Units/kg Intravenous Every Mon, Wed, Fri    famotidine  20 mg Oral Daily    vancomycin (VANCOCIN) IV (PEDS and ADULTS)  500 mg Intravenous Once      amiodarone in dextrose 5%  1 mg/min Intravenous Continuous   Stopped at 24    dexmedeTOMIDine (Precedex) infusion (titrating)  0-1.4 mcg/kg/hr Intravenous Continuous        NORepinephrine bitartrate-D5W  0-3 mcg/kg/min Intravenous Continuous   Stopped at 24    propofoL  0-50  "mcg/kg/min Intravenous Continuous 26.6 mL/hr at 06/26/24 0833 30 mcg/kg/min at 06/26/24 0833       MEDICAL AND SURGICAL HISTORY  Past Medical History:   Diagnosis Date    Anticoagulant long-term use     Arthritis     Breast cancer 2014    invasive lobular carcinoma    Cancer of kidney 11/2020    RIGHT KIDNEY CANCER    CHF (congestive heart failure)     Coronary artery disease dx 2005    Depression     Diabetes mellitus     Diastolic heart failure secondary to hypertension     Gout     Hyperlipemia     Hypertension     Hypertrophy of nasal turbinates     Kidney mass 2020    Right    Levoscoliosis     Lung nodule     left    Multiple thyroid nodules     NICOLE (obstructive sleep apnea)     uses C-PAP    Pulmonary hypertension      Past Surgical History:   Procedure Laterality Date    AORTOGRAPHY N/A 12/04/2020    Procedure: Aortogram;  Surgeon: Paul Pedersen MD;  Location: [facility] CATH;  Service: Cardiology;  Laterality: N/A;    BREAST SURGERY      CARDIAC CATHETERIZATION  12/2020    CHOLECYSTECTOMY      COLONOSCOPY      multi -last 2014     CORONARY ARTERY BYPASS GRAFT      ESOPHAGOGASTRODUODENOSCOPY      2012     HAND SURGERY Right     INSERTION OF CATHETER N/A 6/23/2024    Procedure: Insertion,catheter;  Surgeon: Meli Griffin MD;  Location: [facility] OR;  Service: Vascular;  Laterality: N/A;  ORIGINAL CATHETER WAS REPOSITIONED.  NO NEW CATHETER WAS PLACED    INSERTION, CATHETER, DIALYSIS, PERITONEAL N/A 6/4/2024    Procedure: IN Insertion Catheter, Dialysis, Peritoneal - laparoscopic;  Surgeon: Rodriguez Catalan Jr., MD;  Location: [facility] OR;  Service: General;  Laterality: N/A;    LAPAROSCOPIC ROBOT-ASSISTED SURGICAL REMOVAL OF KIDNEY USING DA CHELLE XI Right 03/10/2022    Procedure: XI ROBOTIC NEPHRECTOMY- radical;  Surgeon: Rolando Ramirez MD;  Location: [facility] OR;  Service: Urology;  Laterality: Right;    MASTECTOMY W/ SENTINEL NODE BIOPSY Bilateral 01/21/2015    bilateral "dog ears"    NASAL SINUS SURGERY  2015 "    Dr Bryant FESS/cauterization turbinate     PARTIAL HYSTERECTOMY      PERCUTANEOUS TRANSLUMINAL BALLOON ANGIOPLASTY OF CORONARY ARTERY  2020    Procedure: Angioplasty-coronary;  Surgeon: Paul Pedersen MD;  Location: [facility] CATH;  Service: Cardiology;;    RENAL BIOPSY Right     2021 EJ    TUBAL LIGATION      ULTRASOUND GUIDANCE  2020    Procedure: Ultrasound Guidance;  Surgeon: Paul Pedersen MD;  [facility];  Service: Cardiology;;       ALCOHOL, TOBACCO AND DRUG USE  Social History     Tobacco Use   Smoking Status Former    Current packs/day: 0.00    Types: Cigarettes    Start date: 2016    Quit date: 2016    Years since quittin.4   Smokeless Tobacco Never   Tobacco Comments    quit      Social History     Substance and Sexual Activity   Alcohol Use No     Social History     Substance and Sexual Activity   Drug Use No       FAMILY HISTORY  Family History   Problem Relation Name Age of Onset    Breast cancer Mother      Stroke Father Waqar Sr.     Hypertension Father Waqar Sr.     Hepatitis Brother      Asthma Daughter eNll Taylor     Birth defects Daughter Nell Camejo's two children has cleft lips    Depression Daughter Nell Taylor     Drug abuse Daughter Nell Taylor     Learning disabilities Daughter Nell Taylor     Mental illness Daughter Nell Taylor     Breast cancer Maternal Aunt      Glaucoma Sister      Drug abuse Daughter Nell     Macular degeneration Neg Hx      Retinal detachment Neg Hx         VITAL SIGNS (MOST RECENT)  Temp: 97.7 °F (36.5 °C) (24 1000)  Pulse: (!) 52 (24 1045)  Resp: (!) 22 (24 1045)  BP: 136/63 (24 1045)  SpO2: 97 % (24 1045)    INTAKE AND OUTPUT (LAST 24 HOURS):  Intake/Output Summary (Last 24 hours) at 2024 1048  Last data filed at 2024 0624  Gross per 24 hour   Intake 1326.07 ml   Output 3065 ml   Net -1738.93 ml       WEIGHT  Wt Readings from Last 1  "Encounters:   06/22/24 (!) 147.9 kg (326 lb 1 oz)       Physical Exam  Vitals reviewed.   Constitutional:       General: She is not in acute distress.     Appearance: She is well-developed. She is obese. She is ill-appearing, toxic-appearing and diaphoretic.   HENT:      Head: Normocephalic and atraumatic.      Mouth/Throat:      Pharynx: No oropharyngeal exudate or posterior oropharyngeal erythema.   Eyes:      General: No scleral icterus.     Pupils: Pupils are equal, round, and reactive to light.   Neck:      Vascular: No JVD.   Cardiovascular:      Rate and Rhythm: Normal rate and regular rhythm.      Heart sounds: Normal heart sounds. No murmur heard.  Pulmonary:      Effort: Pulmonary effort is normal. No respiratory distress.      Breath sounds: Wheezing and rales present.   Abdominal:      General: Bowel sounds are normal. There is distension.      Palpations: Abdomen is soft.      Tenderness: There is no abdominal tenderness.   Musculoskeletal:         General: No swelling.      Cervical back: Normal range of motion and neck supple. No rigidity.      Right lower leg: Edema present.      Left lower leg: Edema present.   Skin:     General: Skin is warm.      Capillary Refill: Capillary refill takes less than 2 seconds.      Coloration: Skin is not pale.      Findings: No rash.   Neurological:      General: No focal deficit present.      Mental Status: She is alert and oriented to person, place, and time.      Cranial Nerves: No cranial nerve deficit.      Motor: No weakness or abnormal muscle tone.           ACUTE PHASE REACTANT (LAST 24 HOURS)  No results for input(s): "FERRITIN", "CRP", "LDH", "DDIMER" in the last 24 hours.    CBC LAST (LAST 24 HOURS)  Recent Labs   Lab 06/26/24  0302 06/26/24  0411   WBC 8.03  --    RBC 4.57  --    HGB 11.6*  --    HCT 39.1 40   MCV 84  --    MCH 25.4*  --    MCHC 29.7*  --    RDW 19.8*  --    PLT 87*  --    MPV 9.7  --    GRAN 64.7  5.2  --    LYMPH 20.8  1.7  --  " "  MONO 10.3  0.8  --    BASO 0.03  --    NRBC 0  --        CHEMISTRY LAST (LAST 24 HOURS)  Recent Labs   Lab 06/26/24  0302 06/26/24  0411     --    K 4.8  --      --    CO2 21*  --    ANIONGAP 12  --    BUN 63*  --    CREATININE 4.2*  --    GLU 79  --    CALCIUM 8.7  --    PH  --  7.402   MG 2.3  --        COAGULATION LAST (LAST 24 HOURS)  No results for input(s): "LABPT", "INR", "APTT" in the last 24 hours.      CARDIAC PROFILE (LAST 24 HOURS)  Recent Labs   Lab 06/20/24  0402 06/24/24 2016 06/26/24  0302   BNP 1,726*  --   --    TROPONINIHS 31.0*   < > 47.2*    < > = values in this interval not displayed.       LAST 7 DAYS MICROBIOLOGY   Microbiology Results (last 7 days)       Procedure Component Value Units Date/Time    Culture, Respiratory with Gram Stain [[id number]] Collected: 06/24/24 1848    Order Status: Completed Specimen: Respiratory from Sputum, Expectorated Updated: 06/26/24 0918     Respiratory Culture No normal respiratory lesley     Gram Stain (Respiratory) <10 epithelial cells per low power field.     Gram Stain (Respiratory) Few WBC's     Gram Stain (Respiratory) No organisms seen    Blood culture [[id number]] Collected: 06/24/24 2016    Order Status: Completed Specimen: Blood from Peripheral, Hand, Right Updated: 06/25/24 2232     Blood Culture, Routine No Growth to date      No Growth to date            MOST RECENT IMAGING  X-Ray Chest AP Portable  Narrative: EXAMINATION:  XR CHEST AP PORTABLE    CLINICAL HISTORY:  Ventilator;    FINDINGS:  Portable chest radiograph at 05:16 hours compared to 06/25/2024 is limited by patient positioning and suboptimal technique.  ET and NG tubes, and left internal jugular split hemodialysis catheter are all unchanged in position.    The cardiac silhouette is enlarged, stable in size, with stable pulmonary vascularity.  The lungs are symmetrically inflated, with no new pleural or parenchymal abnormality.  Impression: No significant interval " change.    Electronically signed by: Niko Mendiola  Date:    06/26/2024  Time:    07:08      CURRENT VISIT EKG  Results for orders placed or performed during the hospital encounter of 06/18/24   EKG 12-lead    Narrative    Ordered by an unspecified provider.       ECHOCARDIOGRAM RESULTS  Results for orders placed during the hospital encounter of 06/18/24    Echo    Interpretation Summary    Left Ventricle: The left ventricle is moderately dilated. Moderately increased wall thickness. There is moderate concentric hypertrophy. Moderate global hypokinesis present. There is mildly reduced systolic function with a visually estimated ejection fraction of 40 - 45%. Unable to assess diastolic function due to atrial fibrillation.    Right Ventricle: Normal right ventricular cavity size. Wall thickness is normal. Systolic function is borderline low.    Left Atrium: Left atrium is mildly dilated.    Right Atrium: Right atrium is mildly dilated.    Mitral Valve: There is mild regurgitation with a centrally directed jet.    Tricuspid Valve: There is mild regurgitation with a centrally directed jet.    Pulmonary Artery: There is moderate to severe pulmonary hypertension. The estimated pulmonary artery systolic pressure is 56 mmHg.    IVC/SVC: Elevated venous pressure at 15 mmHg.        VENTILATOR INFORMATION  Vent Mode: A/C  Oxygen Concentration (%):  [30-35] 30  Resp Rate Total:  [22 br/min-39 br/min] 22 br/min  Vt Set:  [400 mL] 400 mL  PEEP/CPAP:  [5 cmH20-6.4 cmH20] 6.2 cmH20  Pressure Support:  [5 cmH20] 5 cmH20  Mean Airway Pressure:  [7.9 dlP99-61 cmH20] 12 cmH20           LAST ARTERIAL BLOOD GAS  ABG  Recent Labs   Lab 06/26/24  0411   PH 7.402   PO2 94   PCO2 37.2   HCO3 23.2*   BE -2       ASSESSMENT:   Cardiac arrest  Hyperkalemia   Hx of vascular disease   UTI  NICOLE and probable OHS   Chronic hypercapneic respiratory failure   7.   Ventricular tachycardia   8.  Thrombocytopenia     Still not clear on etiology of arrest  but may be a NSTEMI precipitated by dialysis.  ECHO has evidence of reduced EF.     Neuro exam is promising today. She opens eyes and is moving extremities. Tolerating dialysis and is off of vasopressors.     Plan for extubation following dialysis today if tolerates repeat SBT. Will place on precedex  and prn fentanyl to help tolerate endotracheal tube.     PLAN:   - Continue broad spectrum antibiotics  - Repeat SAT/SBT- likely extubation following HD if she does ok on SBT - continue precedex and prn fentanyl if needed.   - Off vasopressors today  - Appreciate nephrology and cardiology consultants assistance   - Defer management of NSTEMI and EF to cardiology- not currently on anticoagulation for NSTEMI- will reach out to cardiology to see if they feel neccessary.   - Platelet drop today- may be related to the HD circuit itself rather than HIT- would continue monitoring platelet count.     Seymour Nicole MD  Date of Service: 06/26/2024  5:28 PM    Upon my evaluation, this patient had a high probability of imminent or life-threatening deterioration, which required my direct attention, intervention, and personal management.    I have personally provided at least 35 minutes of critical care time exclusive of time spent on separately billable procedures. Over 50% of the time of this encounter was spent in direct care at the bedside. Time includes review of laboratory data, radiology results, discussion with consultants, and monitoring for potential decompensation. Interventions were performed as documented above.

## 2024-06-26 NOTE — PROGRESS NOTES
"Formerly [facility]  Department of Cardiology  Progress Note      PATIENT NAME: Juhi Taylor  MRN: [id number]  TODAY'S DATE: 06/26/2024  ADMIT DATE: 6/18/2024                          CONSULT REQUESTED BY: Manuel Christensen MD    SUBJECTIVE     PRINCIPAL PROBLEM: Chronic kidney disease (CKD), stage V      REASON FOR CONSULT:  cardiac arrest, bradycardia, elevated troponin    INTERVAL HISTORY:  06/26/24  BP stable overnight off pressor   Remains intubated, she is waking up off sedation  Platelets dropped to 87;      HPI:  Patient was 74-year-old female who has been hospitalized since 06/18/2024.  She was status post cardiac arrest, PEA during dialysis yesterday evening.  She is intubated and unable to provide any history.  Strips reviewed from time event showed brief run of V-tach    "Code blue was called on June 24th evening, she was unresponsive. Chest compressions were already underway. Rhythm was consistent with PE a arrest initially, after giving epinephrine, the rhythm appeared to change to ventricular tachycardia. We cardioverted and patient achieved ROSC.  Additionallly she received bicarbonate and glucose. "      Review of patient's allergies indicates:   Allergen Reactions    Percocet [oxycodone-acetaminophen] Itching    Amiodarone analogues      Itching      Irbesartan Swelling    Januvia [sitagliptin]     Jardiance [empagliflozin]      Leg cramps    Lipitor [atorvastatin] Other (See Comments)     Severe leg pain    Linaclotide Other (See Comments) and Nausea And Vomiting     Does not remember    Lubiprostone Other (See Comments) and Palpitations     Does not remember       Past Medical History:   Diagnosis Date    Anticoagulant long-term use     Arthritis     Breast cancer 2014    invasive lobular carcinoma    Cancer of kidney 11/2020    RIGHT KIDNEY CANCER    CHF (congestive heart failure)     Coronary artery disease dx 2005    Depression     Diabetes mellitus     Diastolic heart failure " "secondary to hypertension     Gout     Hyperlipemia     Hypertension     Hypertrophy of nasal turbinates     Kidney mass 2020    Right    Levoscoliosis     Lung nodule     left    Multiple thyroid nodules     NICOLE (obstructive sleep apnea)     uses C-PAP    Pulmonary hypertension      Past Surgical History:   Procedure Laterality Date    AORTOGRAPHY N/A 12/04/2020    Procedure: Aortogram;  Surgeon: Paul Pedersen MD;  Location: [facility] CATH;  Service: Cardiology;  Laterality: N/A;    BREAST SURGERY      CARDIAC CATHETERIZATION  12/2020    CHOLECYSTECTOMY      COLONOSCOPY      multi -last 2014     CORONARY ARTERY BYPASS GRAFT      ESOPHAGOGASTRODUODENOSCOPY      2012     HAND SURGERY Right     INSERTION OF CATHETER N/A 6/23/2024    Procedure: Insertion,catheter;  Surgeon: Meli Griffin MD;  Location: [facility] OR;  Service: Vascular;  Laterality: N/A;  ORIGINAL CATHETER WAS REPOSITIONED.  NO NEW CATHETER WAS PLACED    INSERTION, CATHETER, DIALYSIS, PERITONEAL N/A 6/4/2024    Procedure: IN Insertion Catheter, Dialysis, Peritoneal - laparoscopic;  Surgeon: Rodriguez Catalan Jr., MD;  Location: [facility] OR;  Service: General;  Laterality: N/A;    LAPAROSCOPIC ROBOT-ASSISTED SURGICAL REMOVAL OF KIDNEY USING DA CHELLE XI Right 03/10/2022    Procedure: XI ROBOTIC NEPHRECTOMY- radical;  Surgeon: Rolando Ramirez MD;  Location: [facility] OR;  Service: Urology;  Laterality: Right;    MASTECTOMY W/ SENTINEL NODE BIOPSY Bilateral 01/21/2015    bilateral "dog ears"    NASAL SINUS SURGERY  2015    Dr Bryant FESS/cauterization turbinate     PARTIAL HYSTERECTOMY  1989    PERCUTANEOUS TRANSLUMINAL BALLOON ANGIOPLASTY OF CORONARY ARTERY  12/04/2020    Procedure: Angioplasty-coronary;  Surgeon: Paul Pedersen MD;  Location: [facility] CATH;  Service: Cardiology;;    RENAL BIOPSY Right     9/20/2021 EJ    TUBAL LIGATION      ULTRASOUND GUIDANCE  12/04/2020    Procedure: Ultrasound Guidance;  Surgeon: Paul Pedersen MD;  Location: [facility] CATH;  " Service: Cardiology;;     Social History     Tobacco Use    Smoking status: Former     Current packs/day: 0.00     Types: Cigarettes     Start date: 2016     Quit date: 2016     Years since quittin.4    Smokeless tobacco: Never    Tobacco comments:     quit    Substance Use Topics    Alcohol use: No    Drug use: No        REVIEW OF SYSTEMS  Negative except as mentioned in HPI    OBJECTIVE     VITAL SIGNS (Most Recent)  Temp: 98 °F (36.7 °C) (24 0730)  Pulse: (!) 55 (24)  Resp: (!) 22 (24)  BP: 134/62 (24)  SpO2: 95 % (24)    VENTILATION STATUS  Resp: (!) 22 (24)  SpO2: 95 % (24)  Vent Mode: A/C  Oxygen Concentration (%):  [30-35] 30  Resp Rate Total:  [22 br/min-39 br/min] 22 br/min  Vt Set:  [400 mL] 400 mL  PEEP/CPAP:  [5 cmH20-6.4 cmH20] 6.2 cmH20  Pressure Support:  [5 cmH20] 5 cmH20  Mean Airway Pressure:  [7.9 sfV28-90 cmH20] 12 cmH20        I & O (Last 24H):  Intake/Output Summary (Last 24 hours) at 2024 0920  Last data filed at 2024 0624  Gross per 24 hour   Intake 1326.07 ml   Output 3065 ml   Net -1738.93 ml       WEIGHTS  Wt Readings from Last 3 Encounters:   24 0443 (!) 147.9 kg (326 lb 1 oz)   24 1927 (!) 147.3 kg (324 lb 11.8 oz)   24 1202 132.5 kg (292 lb)   24 1009 (!) 147.9 kg (326 lb)   24 0910 132.5 kg (292 lb 1.8 oz)       PHYSICAL EXAM    GENERAL:  Morbidly obese female resting on ventilator undergoing dialysis   HEENT: Normocephalic.  ETT and OG tube in place  NECK: No JVD.   CARDIAC: Regular bradycardic rate and rhythm.  CHEST ANATOMY:  Left chest wall dialysis catheter oozing blood  LUNGS:  Scattered rhonchi, intubated  ABDOMEN: Soft, obese hypoactive bowel sounds.    EXTREMITIES: 2-3+ pitting edema lower extremities and feet  CENTRAL NERVOUS SYSTEM:  Intubated, moving around, slightly agitated, off sedation  Damon cath in place draining small amount of yellow  urine  Tele: Sinus bradycardia      HOME MEDICATIONS:  No current facility-administered medications on file prior to encounter.     Current Outpatient Medications on File Prior to Encounter   Medication Sig Dispense Refill    allopurinoL (ZYLOPRIM) 300 MG tablet Take 1 tablet (300 mg total) by mouth once daily. 90 tablet 3    amLODIPine (NORVASC) 10 MG tablet Take 10 mg by mouth once daily.      calcitRIOL (ROCALTROL) 0.5 MCG Cap Take 0.5 mcg by mouth once daily.      carvediloL (COREG) 25 MG tablet Take 1 tablet (25 mg total) by mouth 2 (two) times daily. 180 tablet 2    cyanocobalamin (VITAMIN B-12) 100 MCG tablet Take 100 mcg by mouth once daily.      evolocumab (REPATHA SURECLICK) 140 mg/mL PnIj Inject 1 mL (140 mg total) into the skin every 14 (fourteen) days. 2 mL 11    furosemide (LASIX) 40 MG tablet Take 40 mg by mouth once daily.      loratadine (CLARITIN) 10 mg tablet Take 10 mg by mouth once daily.      magnesium oxide (MAG-OX) 400 mg (241.3 mg magnesium) tablet Take 1 tablet (400 mg total) by mouth daily as needed (cramping). 30 tablet 0    blood sugar diagnostic (TRUE METRIX GLUCOSE TEST STRIP) Strp Use 1x daily. Insurance preferred. 100 strip 3    blood sugar diagnostic Strp To check BG 1 time daily, to use with insurance preferred meter 100 strip 3    cloNIDine (CATAPRES) 0.1 MG tablet Take 1 tablet (0.1 mg total) by mouth 3 (three) times daily as needed (PRN SBP > 165 mmHg). 90 tablet 6    cloNIDine 0.1 mg/24 hr td ptwk (CATAPRES) 0.1 mg/24 hr Place 1 patch onto the skin every 7 days. 4 patch 4    lancets Misc To check BG 1 times daily, to use with insurance preferred meter 100 each 3    nitroGLYCERIN (NITROSTAT) 0.4 MG SL tablet Place 1 tablet (0.4 mg total) under the tongue every 5 (five) minutes as needed for Chest pain. 25 tablet 0    [DISCONTINUED] aspirin (ECOTRIN) 81 MG EC tablet Take 1 tablet (81 mg total) by mouth once daily. 90 tablet 3    [DISCONTINUED] DULoxetine (CYMBALTA) 20 MG capsule  TAKE ONE CAPSULE BY MOUTH ONCE DAILY 30 capsule 4    [DISCONTINUED] folic acid (FOLVITE) 1 MG tablet Take 1 mg by mouth once daily.      [DISCONTINUED] metFORMIN (GLUCOPHAGE-XR) 500 MG ER 24hr tablet Take 2 tablets (1,000 mg total) by mouth 2 (two) times daily with meals. 360 tablet 3    [DISCONTINUED] sodium bicarbonate 650 MG tablet Take 1 tablet (650 mg total) by mouth once daily. 30 tablet 11       SCHEDULED MEDS:   cefTRIAXone (Rocephin) IV (PEDS and ADULTS)  1 g Intravenous Q24H    epoetin alaina (PROCRIT) injection  50 Units/kg Intravenous Every Mon, Wed, Fri    famotidine  20 mg Oral Daily    vancomycin (VANCOCIN) IV (PEDS and ADULTS)  500 mg Intravenous Once       CONTINUOUS INFUSIONS:   amiodarone in dextrose 5%  1 mg/min Intravenous Continuous   Stopped at 06/24/24 2010    NORepinephrine bitartrate-D5W  0-3 mcg/kg/min Intravenous Continuous   Stopped at 06/24/24 2215    propofoL  0-50 mcg/kg/min Intravenous Continuous 26.6 mL/hr at 06/26/24 0833 30 mcg/kg/min at 06/26/24 0833       PRN MEDS:  Current Facility-Administered Medications:     acetaminophen, 650 mg, Oral, Q8H PRN    aluminum-magnesium hydroxide-simethicone, 30 mL, Oral, QID PRN    dextrose 50%, 12.5 g, Intravenous, PRN    dextrose 50%, 25 g, Intravenous, PRN    glucagon (human recombinant), 1 mg, Intramuscular, PRN    glucose, 16 g, Oral, PRN    glucose, 24 g, Oral, PRN    melatonin, 6 mg, Oral, Nightly PRN    ondansetron, 4 mg, Intravenous, Q6H PRN    ondansetron, 4 mg, Intravenous, Daily PRN    Pharmacy to dose Vancomycin consult, , , Once **AND** vancomycin - pharmacy to dose, , Intravenous, pharmacy to manage frequency    LABS AND DIAGNOSTICS     CBC LAST 3 DAYS  Recent Labs   Lab 06/24/24 2016 06/25/24  0321 06/25/24  0519 06/26/24  0302 06/26/24  0411   WBC 11.11 8.35  --  8.03  --    RBC 4.37 4.21  --  4.57  --    HGB 11.0* 10.5*  --  11.6*  --    HCT 37.5 34.1* 41 39.1 40   MCV 86 81*  --  84  --    MCH 25.2* 24.9*  --  25.4*  --     MCHC 29.3* 30.8*  --  29.7*  --    RDW 18.5* 18.3*  --  19.8*  --     153  --  87*  --    MPV 9.9 10.2  --  9.7  --    GRAN 75.0*  8.4* 61.8  5.2  --  64.7  5.2  --    LYMPH 13.7*  1.5 24.4  2.0  --  20.8  1.7  --    MONO 9.4  1.0 10.3  0.9  --  10.3  0.8  --    BASO 0.02 0.02  --  0.03  --    NRBC 1* 1*  --  0  --        COAGULATION LAST 3 DAYS  Recent Labs   Lab 06/24/24 2016   INR 1.1       CHEMISTRY LAST 3 DAYS  Recent Labs   Lab 06/22/24  0526 06/23/24  0442 06/24/24  0543 06/24/24  1836 06/24/24 2016 06/25/24  0321 06/25/24  0519 06/26/24  0302 06/26/24  0411    137 137  --  136 136  --  137  --    K 4.4 5.3* 5.4*  --  4.1 3.9  --  4.8  --     106 107  --  103 103  --  104  --    CO2 27 22* 22*  --  22* 23  --  21*  --    ANIONGAP 9 9 8  --  11 10  --  12  --    * 90* 107*  --  85* 91*  --  63*  --    CREATININE 5.1* 4.6* 5.5*  --  4.8* 5.1*  --  4.2*  --    * 124* 124*  --  211* 104  --  79  --    CALCIUM 9.3 9.1 9.0  --  8.6* 8.4*  --  8.7  --    PH  --   --   --  7.373  --   --  7.461*  --  7.402   MG 2.7* 2.4 2.6  --   --  2.4  --  2.3  --    ALBUMIN 3.2* 3.1*  --   --  2.8*  --   --   --   --    PROT 6.1 5.8*  --   --  5.3*  --   --   --   --    ALKPHOS 84 85  --   --  84  --   --   --   --    ALT 13 14  --   --  11  --   --   --   --    AST 11 16  --   --  19  --   --   --   --    BILITOT 0.6 0.7  --   --  0.7  --   --   --   --        CARDIAC PROFILE LAST 3 DAYS  Recent Labs   Lab 06/20/24  0402   BNP 1,726*       ENDOCRINE LAST 3 DAYS  Recent Labs   Lab 06/19/24  1625   PROCAL 0.238       LAST ARTERIAL BLOOD GAS  ABG  Recent Labs   Lab 06/26/24  0411   PH 7.402   PO2 94   PCO2 37.2   HCO3 23.2*   BE -2       LAST 7 DAYS MICROBIOLOGY   Microbiology Results (last 7 days)       Procedure Component Value Units Date/Time    Culture, Respiratory with Gram Stain [[id number]] Collected: 06/24/24 3570    Order Status: Completed Specimen: Respiratory from Sputum,  Expectorated Updated: 06/26/24 0918     Respiratory Culture No normal respiratory lesley     Gram Stain (Respiratory) <10 epithelial cells per low power field.     Gram Stain (Respiratory) Few WBC's     Gram Stain (Respiratory) No organisms seen    Blood culture [[id number]] Collected: 06/24/24 2016    Order Status: Completed Specimen: Blood from Peripheral, Hand, Right Updated: 06/25/24 2232     Blood Culture, Routine No Growth to date      No Growth to date            MOST RECENT IMAGING  X-Ray Chest AP Portable  Narrative: EXAMINATION:  XR CHEST AP PORTABLE    CLINICAL HISTORY:  Ventilator;    FINDINGS:  Portable chest radiograph at 05:16 hours compared to 06/25/2024 is limited by patient positioning and suboptimal technique.  ET and NG tubes, and left internal jugular split hemodialysis catheter are all unchanged in position.    The cardiac silhouette is enlarged, stable in size, with stable pulmonary vascularity.  The lungs are symmetrically inflated, with no new pleural or parenchymal abnormality.  Impression: No significant interval change.    Electronically signed by: Niko Mendiola  Date:    06/26/2024  Time:    07:08      ECHOCARDIOGRAM RESULTS (last 5)  Results for orders placed during the hospital encounter of 06/18/24    Echo    Interpretation Summary    Left Ventricle: The left ventricle is moderately dilated. Moderately increased wall thickness. There is moderate concentric hypertrophy. Moderate global hypokinesis present. There is mildly reduced systolic function with a visually estimated ejection fraction of 40 - 45%. Unable to assess diastolic function due to atrial fibrillation.    Right Ventricle: Normal right ventricular cavity size. Wall thickness is normal. Systolic function is borderline low.    Left Atrium: Left atrium is mildly dilated.    Right Atrium: Right atrium is mildly dilated.    Mitral Valve: There is mild regurgitation with a centrally directed jet.    Tricuspid Valve: There is mild  regurgitation with a centrally directed jet.    Pulmonary Artery: There is moderate to severe pulmonary hypertension. The estimated pulmonary artery systolic pressure is 56 mmHg.    IVC/SVC: Elevated venous pressure at 15 mmHg.      Results for orders placed during the hospital encounter of 08/20/23    Echo    Interpretation Summary    Left Ventricle: The left ventricle is normal in size. Increased ventricular mass. Moderately increased wall thickness. Mild global hypokinesis present. There is mildly reduced systolic function with a visually estimated ejection fraction of 40 - 50%.  EF ~ 45%% Grade I diastolic dysfunction. Elevated left ventricular filling pressure. Tissue Doppler velocity is reduced.    Right Ventricle: Normal right ventricular cavity size. Systolic function is normal.    Mitral Valve: There is no stenosis. The mean pressure gradient across the mitral valve is 3 mmHg at a heart rate of  bpm.    IVC/SVC: Normal venous pressure at 3 mmHg.    Left ventricle hypertrophy with depressed ejection fraction and elevation of mean left atrial pressure      Results for orders placed during the hospital encounter of 11/19/21    Echo    Interpretation Summary  · The estimated ejection fraction is 35-40%.  · The left ventricle is moderately enlarged with moderate concentric hypertrophy  · Severe left atrial enlargement.  · Normal right ventricular size with normal right ventricular systolic function.  · Mild-to-moderate mitral regurgitation.  · Mild tricuspid regurgitation.  · The estimated PA systolic pressure is 44 mmHg.  · There is pulmonary hypertension.      Results for orders placed during the hospital encounter of 11/30/20    Echo Color Flow Doppler? Yes    Interpretation Summary  · There is left ventricular concentric hypertrophy.  · With low normal systolic function. The estimated ejection fraction is 50%.  · Grade II diastolic dysfunction.  · With normal right ventricular systolic function.  · Severe  left atrial enlargement.  · Mild right atrial enlargement.  · Mild mitral regurgitation.  · Mild to moderate tricuspid regurgitation.  · Normal central venous pressure (3 mmHg).  · The estimated PA systolic pressure is 51 mmHg.  · There is pulmonary hypertension.      Results for orders placed in visit on 07/06/18    Transthoracic echo (TTE) complete    Interpretation Summary  · Left ventricle ejection fraction is mildly decreased at 45%  · Grade I (mild) left ventricular diastolic dysfunction consistent with impaired relaxation.  · Mitral valve shows mild regurgitation.      CURRENT/PREVIOUS VISIT EKG  Results for orders placed or performed during the hospital encounter of 06/18/24   EKG 12-lead    Collection Time: 06/26/24  1:46 AM   Result Value Ref Range    QRS Duration 136 ms    [facility] QTC Calculation 482 ms    Narrative    Test Reason : R00.1,    Vent. Rate : 055 BPM     Atrial Rate : 055 BPM     P-R Int : 154 ms          QRS Dur : 136 ms      QT Int : 504 ms       P-R-T Axes : 034 -11 189 degrees     QTc Int : 482 ms    Sinus bradycardia with occasional Premature ventricular complexes  Right bundle branch block  LVH with repolarization abnormality  Possible Lateral infarct ,age undetermined  Abnormal ECG  When compared with ECG of 25-JUN-2024 00:30,  Right bundle branch block is now Present    Referred By: MICHELLE   SELF           Confirmed By:            ASSESSMENT/PLAN:     Active Hospital Problems    Diagnosis    *Chronic kidney disease (CKD), stage V    Cardiac arrest    CHF (congestive heart failure)    Acute hypoxic respiratory failure    Morbid obesity    Renal cell carcinoma of right kidney    Paroxysmal atrial fibrillation    Type 2 diabetes mellitus with hyperglycemia    Coronary artery disease    Essential hypertension       ASSESSMENT & PLAN:   Cardiac arrest  Ventricular tachycardia  Left bundle branch block  Cardiomyopathy  Acute hypoxic respiratory failure  CAD  H/O PCI x3 in  2020  Hypertension  Congestive heart failure  Paroxysmal atrial fibrillation  Diabetes mellitus 2  CKD stage 5/ESRD  Morbid Obesity  Renal cell carcinoma of right kidney -right nephrectomy  Thrombocytopenia      RECOMMENDATIONS:  -Patient is status post PEA/V-tach arrest on 06/24/2024  -Pressors have been weaned off  -Echocardiogram showed drop in EF to 20-25% (reduced EF since 2021)  -Left bundle-branch block noted on EKG.  -Pt will need ICD implanted once recovers from pulmonary standpoint. This would need to be done at [facility]  -IV amiodarone was held due to bradycardia  -HR starting to improved today. Start metoprolol tartrate 12.5 mg BID  -Closely monitor electrolytes daily. K+ is 4.8.  Magnesium 2.3 today  -HS troponin levels trended down  -Pulmonologist discussed plans to extubate today  -Platelets dropped today; No heparin being given. Monitor CBC daily    Ramila Lucia NP  [facility]  Department of Cardiology  Date of Service: 06/26/2024        I have personally interviewed and examined the patient, I have reviewed and discussed the Nurse Practitioner's history and physical, assessment, and plan. I agree with the findings and plan.  As above.  Dr. Dennis Trejo M.D.  [facility]  Department of Cardiology  Date of Service: 06/26/2024  2:29 PM

## 2024-06-26 NOTE — ASSESSMENT & PLAN NOTE
"Patient's FSGs are controlled on current medication regimen.  Last A1c reviewed-   Lab Results   Component Value Date    HGBA1C 6.8 (H) 06/19/2024     Most recent fingerstick glucose reviewed- No results for input(s): "POCTGLUCOSE" in the last 24 hours.  Current correctional scale  Medium  Maintain anti-hyperglycemic dose as follows-   Antihyperglycemics (From admission, onward)    None        Hold Oral hypoglycemics while patient is in the hospital.  "

## 2024-06-26 NOTE — SUBJECTIVE & OBJECTIVE
Interval History:     Patient was seen and examined and discussed with the family.  Patient had spontaneous breathing trial this morning and continue neurological response  Acute drop in platelet noted possible dialysis versus heparin induced    Review of Systems  Objective:     Vital Signs (Most Recent):  Temp: 97.7 °F (36.5 °C) (06/26/24 1315)  Pulse: 72 (06/26/24 1330)  Resp: (!) 50 (06/26/24 1330)  BP: (!) 184/83 (06/26/24 1330)  SpO2: 95 % (06/26/24 1330) Vital Signs (24h Range):  Temp:  [97.4 °F (36.3 °C)-98.4 °F (36.9 °C)] 97.7 °F (36.5 °C)  Pulse:  [50-83] 72  Resp:  [11-56] 50  SpO2:  [95 %-100 %] 95 %  BP: (112-193)/() 184/83     Weight: (!) 147.9 kg (326 lb 1 oz)  Body mass index is 51.06 kg/m².    Intake/Output Summary (Last 24 hours) at 6/26/2024 1353  Last data filed at 6/26/2024 1315  Gross per 24 hour   Intake 1826.07 ml   Output 6565 ml   Net -4738.93 ml         Physical Exam  Vitals and nursing note reviewed.   Constitutional:       Comments: sedated   HENT:      Head: Normocephalic and atraumatic.   Eyes:      Conjunctiva/sclera: Conjunctivae normal.   Neck:      Vascular: No JVD.   Cardiovascular:      Rate and Rhythm: Normal rate.      Heart sounds: Normal heart sounds.   Pulmonary:      Effort: Pulmonary effort is normal.      Breath sounds: Normal breath sounds.   Abdominal:      Palpations: Abdomen is soft.   Genitourinary:     General: Normal vulva.   Skin:     General: Skin is warm.   Neurological:      Comments: intubated   Psychiatric:         Mood and Affect: Mood normal.             Significant Labs: All pertinent labs within the past 24 hours have been reviewed.  BMP:   Recent Labs   Lab 06/26/24  0302   GLU 79      K 4.8      CO2 21*   BUN 63*   CREATININE 4.2*   CALCIUM 8.7   MG 2.3     CBC:   Recent Labs   Lab 06/24/24  2016 06/25/24  0321 06/25/24  0519 06/26/24  0302 06/26/24  0411   WBC 11.11 8.35  --  8.03  --    HGB 11.0* 10.5*  --  11.6*  --    HCT 37.5 34.1*  41 39.1 40    153  --  87*  --      CMP:   Recent Labs   Lab 06/24/24  2016 06/25/24  0321 06/26/24  0302    136 137   K 4.1 3.9 4.8    103 104   CO2 22* 23 21*   * 104 79   BUN 85* 91* 63*   CREATININE 4.8* 5.1* 4.2*   CALCIUM 8.6* 8.4* 8.7   PROT 5.3*  --   --    ALBUMIN 2.8*  --   --    BILITOT 0.7  --   --    ALKPHOS 84  --   --    AST 19  --   --    ALT 11  --   --    ANIONGAP 11 10 12       Significant Imaging: I have reviewed all pertinent imaging results/findings within the past 24 hours.

## 2024-06-26 NOTE — ASSESSMENT & PLAN NOTE
Chronic stable issue  Continue carvedilol, Holding home apixaban/hepain drip  for now  No more oozing from HD tunnel catheter

## 2024-06-27 LAB
ALBUMIN SERPL BCP-MCNC: 3.3 G/DL (ref 3.5–5.2)
ALLENS TEST: ABNORMAL
ALLENS TEST: ABNORMAL
ALP SERPL-CCNC: 90 U/L (ref 55–135)
ALT SERPL W/O P-5'-P-CCNC: 5 U/L (ref 10–44)
ANION GAP SERPL CALC-SCNC: 10 MMOL/L (ref 8–16)
AST SERPL-CCNC: 25 U/L (ref 10–40)
BASOPHILS # BLD AUTO: 0.02 K/UL (ref 0–0.2)
BASOPHILS # BLD AUTO: 0.02 K/UL (ref 0–0.2)
BASOPHILS NFR BLD: 0.2 % (ref 0–1.9)
BASOPHILS NFR BLD: 0.2 % (ref 0–1.9)
BILIRUB DIRECT SERPL-MCNC: 0.4 MG/DL (ref 0.1–0.3)
BILIRUB SERPL-MCNC: 1 MG/DL (ref 0.1–1)
BUN SERPL-MCNC: 41 MG/DL (ref 8–23)
CALCIUM SERPL-MCNC: 8.6 MG/DL (ref 8.7–10.5)
CHLORIDE SERPL-SCNC: 102 MMOL/L (ref 95–110)
CK SERPL-CCNC: 267 U/L (ref 20–180)
CO2 SERPL-SCNC: 22 MMOL/L (ref 23–29)
CREAT SERPL-MCNC: 3.5 MG/DL (ref 0.5–1.4)
DELSYS: ABNORMAL
DELSYS: ABNORMAL
DIFFERENTIAL METHOD BLD: ABNORMAL
DIFFERENTIAL METHOD BLD: ABNORMAL
EOSINOPHIL # BLD AUTO: 0.2 K/UL (ref 0–0.5)
EOSINOPHIL # BLD AUTO: 0.2 K/UL (ref 0–0.5)
EOSINOPHIL NFR BLD: 1.8 % (ref 0–8)
EOSINOPHIL NFR BLD: 2.3 % (ref 0–8)
ERYTHROCYTE [DISTWIDTH] IN BLOOD BY AUTOMATED COUNT: 18.9 % (ref 11.5–14.5)
ERYTHROCYTE [DISTWIDTH] IN BLOOD BY AUTOMATED COUNT: 19.2 % (ref 11.5–14.5)
ERYTHROCYTE [SEDIMENTATION RATE] IN BLOOD BY WESTERGREN METHOD: 22 MM/H
EST. GFR  (NO RACE VARIABLE): 13.3 ML/MIN/1.73 M^2
FIO2: 30
FIO2: 30
GLUCOSE SERPL-MCNC: 112 MG/DL (ref 70–110)
GLUCOSE SERPL-MCNC: 114 MG/DL (ref 70–110)
GLUCOSE SERPL-MCNC: 138 MG/DL (ref 70–110)
GLUCOSE SERPL-MCNC: 94 MG/DL (ref 70–110)
GLUCOSE SERPL-MCNC: 96 MG/DL (ref 70–110)
GLUCOSE SERPL-MCNC: 97 MG/DL (ref 70–110)
HBV SURFACE AB SER QL: NON REACTIVE
HBV SURFACE AG SERPL QL IA: NEGATIVE
HCO3 UR-SCNC: 23 MMOL/L (ref 24–28)
HCO3 UR-SCNC: 24.1 MMOL/L (ref 24–28)
HCT VFR BLD AUTO: 34.2 % (ref 37–48.5)
HCT VFR BLD AUTO: 36.3 % (ref 37–48.5)
HCT VFR BLD CALC: 39 %PCV (ref 36–54)
HCT VFR BLD CALC: 40 %PCV (ref 36–54)
HGB BLD-MCNC: 10.7 G/DL (ref 12–16)
HGB BLD-MCNC: 11.3 G/DL (ref 12–16)
IMM GRANULOCYTES # BLD AUTO: 0.04 K/UL (ref 0–0.04)
IMM GRANULOCYTES # BLD AUTO: 0.13 K/UL (ref 0–0.04)
IMM GRANULOCYTES NFR BLD AUTO: 0.4 % (ref 0–0.5)
IMM GRANULOCYTES NFR BLD AUTO: 1.3 % (ref 0–0.5)
LACTATE SERPL-SCNC: 1.1 MMOL/L (ref 0.5–1.9)
LYMPHOCYTES # BLD AUTO: 1.1 K/UL (ref 1–4.8)
LYMPHOCYTES # BLD AUTO: 1.4 K/UL (ref 1–4.8)
LYMPHOCYTES NFR BLD: 11.2 % (ref 18–48)
LYMPHOCYTES NFR BLD: 13.5 % (ref 18–48)
MAGNESIUM SERPL-MCNC: 1.9 MG/DL (ref 1.6–2.6)
MAGNESIUM SERPL-MCNC: 2 MG/DL (ref 1.6–2.6)
MAP: 9.2
MCH RBC QN AUTO: 25.4 PG (ref 27–31)
MCH RBC QN AUTO: 25.9 PG (ref 27–31)
MCHC RBC AUTO-ENTMCNC: 31.1 G/DL (ref 32–36)
MCHC RBC AUTO-ENTMCNC: 31.3 G/DL (ref 32–36)
MCV RBC AUTO: 81 FL (ref 82–98)
MCV RBC AUTO: 83 FL (ref 82–98)
MIN VOL: 10.2
MIN VOL: 10.2
MODE: ABNORMAL
MODE: ABNORMAL
MONOCYTES # BLD AUTO: 0.8 K/UL (ref 0.3–1)
MONOCYTES # BLD AUTO: 0.9 K/UL (ref 0.3–1)
MONOCYTES NFR BLD: 8.2 % (ref 4–15)
MONOCYTES NFR BLD: 8.2 % (ref 4–15)
NEUTROPHILS # BLD AUTO: 7.5 K/UL (ref 1.8–7.7)
NEUTROPHILS # BLD AUTO: 7.8 K/UL (ref 1.8–7.7)
NEUTROPHILS NFR BLD: 75 % (ref 38–73)
NEUTROPHILS NFR BLD: 77.7 % (ref 38–73)
NRBC BLD-RTO: 0 /100 WBC
NRBC BLD-RTO: 1 /100 WBC
PCO2 BLDA: 34.3 MMHG (ref 35–45)
PCO2 BLDA: 35.8 MMHG (ref 35–45)
PEEP: 5
PEEP: 5
PH SMN: 7.43 [PH] (ref 7.35–7.45)
PH SMN: 7.44 [PH] (ref 7.35–7.45)
PHOSPHATE SERPL-MCNC: 3.7 MG/DL (ref 2.7–4.5)
PIP: 26
PLATELET # BLD AUTO: 112 K/UL (ref 150–450)
PLATELET # BLD AUTO: 122 K/UL (ref 150–450)
PMV BLD AUTO: 10.8 FL (ref 9.2–12.9)
PMV BLD AUTO: 9.6 FL (ref 9.2–12.9)
PO2 BLDA: 72 MMHG (ref 80–100)
PO2 BLDA: 78 MMHG (ref 80–100)
POC BE: -1 MMOL/L
POC BE: 0 MMOL/L
POC IONIZED CALCIUM: 1.16 MMOL/L (ref 1.06–1.42)
POC IONIZED CALCIUM: 1.16 MMOL/L (ref 1.06–1.42)
POC SATURATED O2: 95 % (ref 95–100)
POC SATURATED O2: 96 % (ref 95–100)
POC TCO2: 24 MMOL/L (ref 23–27)
POC TCO2: 25 MMOL/L (ref 23–27)
POTASSIUM BLD-SCNC: 3.5 MMOL/L (ref 3.5–5.1)
POTASSIUM BLD-SCNC: 3.7 MMOL/L (ref 3.5–5.1)
POTASSIUM SERPL-SCNC: 4.1 MMOL/L (ref 3.5–5.1)
PROT SERPL-MCNC: 6 G/DL (ref 6–8.4)
PS: 10
RBC # BLD AUTO: 4.22 M/UL (ref 4–5.4)
RBC # BLD AUTO: 4.36 M/UL (ref 4–5.4)
SAMPLE: ABNORMAL
SAMPLE: ABNORMAL
SITE: ABNORMAL
SITE: ABNORMAL
SODIUM BLD-SCNC: 135 MMOL/L (ref 136–145)
SODIUM BLD-SCNC: 135 MMOL/L (ref 136–145)
SODIUM SERPL-SCNC: 134 MMOL/L (ref 136–145)
SP02: 91
SP02: 96
TROPONIN I SERPL HS-MCNC: 52.4 PG/ML (ref 0–14.9)
VANCOMYCIN SERPL-MCNC: 14 UG/ML
VOL: 428
VT: 400
WBC # BLD AUTO: 10.33 K/UL (ref 3.9–12.7)
WBC # BLD AUTO: 9.59 K/UL (ref 3.9–12.7)

## 2024-06-27 PROCEDURE — 99900017 HC EXTUBATION W/PARAMETERS (STAT)

## 2024-06-27 PROCEDURE — 63600175 PHARM REV CODE 636 W HCPCS: Performed by: HOSPITALIST

## 2024-06-27 PROCEDURE — 94660 CPAP INITIATION&MGMT: CPT | Mod: XB

## 2024-06-27 PROCEDURE — 99900026 HC AIRWAY MAINTENANCE (STAT)

## 2024-06-27 PROCEDURE — 85025 COMPLETE CBC W/AUTO DIFF WBC: CPT | Mod: 91 | Performed by: INTERNAL MEDICINE

## 2024-06-27 PROCEDURE — 25000003 PHARM REV CODE 250: Performed by: INTERNAL MEDICINE

## 2024-06-27 PROCEDURE — 27100171 HC OXYGEN HIGH FLOW UP TO 24 HOURS

## 2024-06-27 PROCEDURE — 85014 HEMATOCRIT: CPT

## 2024-06-27 PROCEDURE — 99406 BEHAV CHNG SMOKING 3-10 MIN: CPT

## 2024-06-27 PROCEDURE — 36600 WITHDRAWAL OF ARTERIAL BLOOD: CPT

## 2024-06-27 PROCEDURE — 94003 VENT MGMT INPAT SUBQ DAY: CPT

## 2024-06-27 PROCEDURE — 99291 CRITICAL CARE FIRST HOUR: CPT | Mod: ,,, | Performed by: STUDENT IN AN ORGANIZED HEALTH CARE EDUCATION/TRAINING PROGRAM

## 2024-06-27 PROCEDURE — 84100 ASSAY OF PHOSPHORUS: CPT | Performed by: INTERNAL MEDICINE

## 2024-06-27 PROCEDURE — 82550 ASSAY OF CK (CPK): CPT | Performed by: INTERNAL MEDICINE

## 2024-06-27 PROCEDURE — 99233 SBSQ HOSP IP/OBS HIGH 50: CPT | Mod: ,,, | Performed by: INTERNAL MEDICINE

## 2024-06-27 PROCEDURE — 82962 GLUCOSE BLOOD TEST: CPT

## 2024-06-27 PROCEDURE — 99900031 HC PATIENT EDUCATION (STAT)

## 2024-06-27 PROCEDURE — 82330 ASSAY OF CALCIUM: CPT

## 2024-06-27 PROCEDURE — 84295 ASSAY OF SERUM SODIUM: CPT

## 2024-06-27 PROCEDURE — 63600175 PHARM REV CODE 636 W HCPCS: Performed by: STUDENT IN AN ORGANIZED HEALTH CARE EDUCATION/TRAINING PROGRAM

## 2024-06-27 PROCEDURE — 80048 BASIC METABOLIC PNL TOTAL CA: CPT | Performed by: INTERNAL MEDICINE

## 2024-06-27 PROCEDURE — 83735 ASSAY OF MAGNESIUM: CPT | Performed by: INTERNAL MEDICINE

## 2024-06-27 PROCEDURE — 82803 BLOOD GASES ANY COMBINATION: CPT

## 2024-06-27 PROCEDURE — 20000000 HC ICU ROOM

## 2024-06-27 PROCEDURE — 25000003 PHARM REV CODE 250: Performed by: HOSPITALIST

## 2024-06-27 PROCEDURE — 84132 ASSAY OF SERUM POTASSIUM: CPT

## 2024-06-27 PROCEDURE — 94761 N-INVAS EAR/PLS OXIMETRY MLT: CPT | Mod: XB

## 2024-06-27 PROCEDURE — 83735 ASSAY OF MAGNESIUM: CPT | Mod: 91 | Performed by: INTERNAL MEDICINE

## 2024-06-27 PROCEDURE — 85025 COMPLETE CBC W/AUTO DIFF WBC: CPT | Performed by: INTERNAL MEDICINE

## 2024-06-27 PROCEDURE — 90935 HEMODIALYSIS ONE EVALUATION: CPT

## 2024-06-27 PROCEDURE — 84484 ASSAY OF TROPONIN QUANT: CPT | Performed by: INTERNAL MEDICINE

## 2024-06-27 PROCEDURE — 51798 US URINE CAPACITY MEASURE: CPT

## 2024-06-27 PROCEDURE — 83605 ASSAY OF LACTIC ACID: CPT | Performed by: INTERNAL MEDICINE

## 2024-06-27 PROCEDURE — 80202 ASSAY OF VANCOMYCIN: CPT | Performed by: INTERNAL MEDICINE

## 2024-06-27 PROCEDURE — 36415 COLL VENOUS BLD VENIPUNCTURE: CPT | Performed by: INTERNAL MEDICINE

## 2024-06-27 PROCEDURE — 25000003 PHARM REV CODE 250: Performed by: NURSE PRACTITIONER

## 2024-06-27 PROCEDURE — 80076 HEPATIC FUNCTION PANEL: CPT | Performed by: INTERNAL MEDICINE

## 2024-06-27 PROCEDURE — 94799 UNLISTED PULMONARY SVC/PX: CPT

## 2024-06-27 PROCEDURE — 99900035 HC TECH TIME PER 15 MIN (STAT)

## 2024-06-27 RX ORDER — LACTULOSE 10 G/15ML
20 SOLUTION ORAL ONCE
Status: COMPLETED | OUTPATIENT
Start: 2024-06-27 | End: 2024-06-27

## 2024-06-27 RX ORDER — METOPROLOL TARTRATE 25 MG/1
12.5 TABLET ORAL 3 TIMES DAILY
Status: DISCONTINUED | OUTPATIENT
Start: 2024-06-27 | End: 2024-06-28

## 2024-06-27 RX ADMIN — LACTULOSE 20 G: 20 SOLUTION ORAL at 04:06

## 2024-06-27 RX ADMIN — CEFTRIAXONE SODIUM 1 G: 1 INJECTION, POWDER, FOR SOLUTION INTRAMUSCULAR; INTRAVENOUS at 09:06

## 2024-06-27 RX ADMIN — METOPROLOL TARTRATE 12.5 MG: 25 TABLET, FILM COATED ORAL at 08:06

## 2024-06-27 RX ADMIN — METOPROLOL TARTRATE 12.5 MG: 25 TABLET, FILM COATED ORAL at 09:06

## 2024-06-27 RX ADMIN — PROPOFOL 20 MCG/KG/MIN: 10 INJECTION, EMULSION INTRAVENOUS at 07:06

## 2024-06-27 RX ADMIN — PROPOFOL 50 MCG/KG/MIN: 10 INJECTION, EMULSION INTRAVENOUS at 12:06

## 2024-06-27 RX ADMIN — PROPOFOL 50 MCG/KG/MIN: 10 INJECTION, EMULSION INTRAVENOUS at 03:06

## 2024-06-27 RX ADMIN — FAMOTIDINE 20 MG: 20 TABLET ORAL at 08:06

## 2024-06-27 RX ADMIN — Medication 6 MG: at 09:06

## 2024-06-27 NOTE — SUBJECTIVE & OBJECTIVE
Interval History:     Seen in am   Small amount of blood in OG tube . Planning to extubate today   Platelet better . CXR with air space disease but appear fluid     Review of Systems  Objective:     Vital Signs (Most Recent):  Temp: 97.7 °F (36.5 °C) (06/27/24 1215)  Pulse: 66 (06/27/24 1300)  Resp: 20 (06/27/24 1300)  BP: (!) 166/77 (06/27/24 1300)  SpO2: (!) 91 % (06/27/24 1300) Vital Signs (24h Range):  Temp:  [97.7 °F (36.5 °C)-99.1 °F (37.3 °C)] 97.7 °F (36.5 °C)  Pulse:  [56-91] 66  Resp:  [19-56] 20  SpO2:  [90 %-100 %] 91 %  BP: (132-192)/() 166/77     Weight: (!) 147.9 kg (326 lb 1 oz)  Body mass index is 51.06 kg/m².    Intake/Output Summary (Last 24 hours) at 6/27/2024 1315  Last data filed at 6/27/2024 1238  Gross per 24 hour   Intake 1571.46 ml   Output 650 ml   Net 921.46 ml         Physical Exam  Vitals and nursing note reviewed.   HENT:      Head: Normocephalic and atraumatic.      Nose: Nose normal.   Eyes:      Conjunctiva/sclera: Conjunctivae normal.   Neck:      Vascular: No JVD.   Cardiovascular:      Rate and Rhythm: Normal rate.      Heart sounds: Normal heart sounds.   Pulmonary:      Effort: Pulmonary effort is normal.      Comments: decrease  Abdominal:      Palpations: Abdomen is soft.   Skin:     General: Skin is warm.   Neurological:      Comments: On vent              Significant Labs: All pertinent labs within the past 24 hours have been reviewed.  CBC:   Recent Labs   Lab 06/26/24  0302 06/26/24  0411 06/27/24  0045 06/27/24  0310 06/27/24  0415 06/27/24  0539   WBC 8.03  --  9.59 10.33  --   --    HGB 11.6*  --  11.3* 10.7*  --   --    HCT 39.1   < > 36.3* 34.2* 40 39   PLT 87*  --  112* 122*  --   --     < > = values in this interval not displayed.     CMP:   Recent Labs   Lab 06/26/24  0302 06/27/24  0045    134*   K 4.8 4.1    102   CO2 21* 22*   GLU 79 138*   BUN 63* 41*   CREATININE 4.2* 3.5*   CALCIUM 8.7 8.6*   PROT  --  6.0   ALBUMIN  --  3.3*   BILITOT  --   1.0   ALKPHOS  --  90   AST  --  25   ALT  --  5*   ANIONGAP 12 10       Significant Imaging: I have reviewed all pertinent imaging results/findings within the past 24 hours.

## 2024-06-27 NOTE — ASSESSMENT & PLAN NOTE
Patient has severe fluid overload secondary to ESRD, cardiac arrest in HD hemodialysis  Likely multifactorial from CHF with worsening CKD  EF 40-45%  in previous ECHO but repeat with 25% with elevated BNP  HD daily for now as per nephrology   Status post tunnel line 6/22  Intubated with cardiac arrest 6/24 and possible extubation today

## 2024-06-27 NOTE — PROGRESS NOTES
Nephrology Progress Note        Patient Name: Juhi Taylor  MRN: [id number]    Patient Class: IP- Inpatient   Admission Date: 6/18/2024  Length of Stay: 8 days  Date of Service: 6/27/2024    Attending Physician: Manuel Christensen MD  Primary Care Provider: Tony Sadler MD    Reason for Consult: josue    SUBJECTIVE:     HPI: 73F fallowed with Dr. Martinez from nephrology for advanced CKD stage 4-5 getting admitted with fluid overload. Pt had PD catheter insertion 2 weeks ago in preparation for initiation of dialysis, however had post-operative issues with increased leakage. Then her PD in initiation and training was delayed due to insurance issues. She has oliguria/increased weight gain in past 2 weeks, SOB on exertion and cough. Symptoms got worse and she came to ER and got admitted.     I saw her in the ER and recommend trial of lasix gtt, cassia. Will consult Dr. Catalan to evaluate patient and make recommendation for catheter care - we probably will not be able to use it for now for dialysis.    6/19 VSS. Only 175cc UO documented, but may be inaccurately charted - pt boarding in ER. Will increase lasix gtt to 10mg/hr and ensure at least q shift UO documentation.    6/20 VSS. BP low, stop Clonidine patch. Increase lasix gtt to 15mg/hr, add metolazone. Stop Fluconazole in 1-2 days after stopping IV abx for suspected PNA. Appreciate Surgery input - will d/w patient initiation of HD tomorrow if unable to produce much UO and symptomatic improvement...    6/21 VSS. Will discuss initiation of dialysis via HD, she is not responding to diuretics.    6/22 VSS. Plan for HD today after dialysis access placement. Seen on HD, BF around 275 cc/min max, otherwise tolerating well. Will have to be repositioned in cath lab by Dr. Griffin on Monday.    6/23 VSS. Getting HD cath repositioned today, plan HD tomorrow.    6/24 VSS, on 2-3L NC, first HD 6/22, second HD today    6/25 105 min into HD treatment, patient  became lethargic acutely (was conversing/calm)- gave NS bolus- went into PEA arrest- had ACLS with ROSC- net UF 750cc, intubated and transferred to ICU, bradycardic, SBP 110s, FIO2 35%, UOP 100cc, required amio and levophed but now off- mld rise in trop noted, lactic acid normal, CXR clear, echo pending    6/26 tolerated HD yest- got off 2L- echo with EF 20-25% with grade III DD which is much worse compared to < 1 week ago with EF 40-45% suggesting cardiac event- trop peaked- per cards plan for life vest and may need LHC- HR 50s, -160s, FIO2 30%, UOP 550cc  - seen on HD  - bleeding at TDC exit site is better    6/27 tolerated HD well yest- got off 3L- -160, FIO2 30%, UOP 400cc    ASSESSMENT/PLAN:     PEA/VT arrest during dialysis 6/24  - echo shows newly depressed EF supportive of a cardiac event- could have been precipitated by myocardial stress during dialysis- don't feel that trop rise is c/w an acute NSTEMI however- will work on optimization of volume status in prep for LHC and life vest- on beta blocker- appreciate cardiology recs    CKD stage 5- uremia, hyperkalemia, oliguria  PD cathether in place  Initiated on RRT via IHD this admission using TDC  - plan for daily HD treatments for clearance/UF  - renal diet, 1.5L fluid restriction  - will need outpatient HD unit placement- plan for discharge to [place] [facility] in interim  - dose meds for CrCl < 10/HD    Anemia of CKD  Thrombocytopenia  - continue PONCE with HD  - hold heparin with HD    MBD / Secondary HPT  - phos is at goal- continue renal based nutrition  - last PTH acceptable    Baseline HTN  Volume overload/S CHF exacerbation  Hyponatremia  - still very edematous  - push UF with HD    Updated family     Thank you for allowing us to participate in the care of your patient!   We will follow the patient and provide recommendations as needed.         Laboratory:  Recent Labs   Lab 06/25/24  0321 06/26/24  0302 06/27/24  0045    137 134*   K  "3.9 4.8 4.1    104 102   CO2 23 21* 22*   BUN 91* 63* 41*   CREATININE 5.1* 4.2* 3.5*    79 138*       Recent Labs   Lab 06/23/24  0442 06/24/24  0543 06/24/24 2016 06/25/24  0321 06/26/24  0302 06/27/24  0045 06/27/24  0310   CALCIUM 9.1 9.0 8.6* 8.4* 8.7 8.6*  --    ALBUMIN 3.1*  --  2.8*  --   --  3.3*  --    PHOS  --  6.0*  --   --   --  3.7  --    MG 2.4 2.6  --  2.4 2.3 2.0 1.9       Recent Labs   Lab 10/10/23  1113 01/22/24  1133 05/08/24  1139   PTH, Intact 583.1 H 506.7 H 291.7 H       No results for input(s): "POCTGLUCOSE" in the last 168 hours.    Recent Labs   Lab 07/05/23  1533 01/22/24  1133 06/19/24  0443   Hemoglobin A1C 5.6 5.4 6.8 H       Recent Labs   Lab 06/26/24  0302 06/26/24  0411 06/27/24  0045 06/27/24  0310 06/27/24  0415 06/27/24  0539   WBC 8.03  --  9.59 10.33  --   --    HGB 11.6*  --  11.3* 10.7*  --   --    HCT 39.1   < > 36.3* 34.2* 40 39   PLT 87*  --  112* 122*  --   --    MCV 84  --  83 81*  --   --    MCHC 29.7*  --  31.1* 31.3*  --   --    MONO 10.3  0.8  --  8.2  0.8 8.2  0.9  --   --    EOSINOPHIL 3.6  --  2.3 1.8  --   --     < > = values in this interval not displayed.       Recent Labs   Lab 06/23/24 0442 06/24/24 2016 06/27/24 0045   BILITOT 0.7 0.7 1.0   BILIDIR  --   --  0.4*   PROT 5.8* 5.3* 6.0   ALBUMIN 3.1* 2.8* 3.3*   ALKPHOS 85 84 90   ALT 14 11 5*   AST 16 19 25       Recent Labs   Lab 03/17/22  2240 04/07/22  1536 04/17/23  1448 07/14/23  2026 03/14/24  2254 05/09/24  1029 06/23/24  1816   Color, UA Colorless A   < > Colorless A  --  Yellow Yellow  --    Appearance, UA Clear   < > Clear  --  Clear Clear  --    pH, UA 5.0   < > 6.0  --  6.0 6.0  --    Specific Colbert, UA 1.010   < > 1.010  --  1.010 1.015  --    Protein, UA Negative   < > 1+ A  --  1+ A 1+ A  --    Glucose, UA Negative   < > Negative  --  Negative Negative  --    Ketones, UA Negative   < > Negative  --  Negative Negative  --    Urobilinogen, UA Negative  --   --   --  Negative "  --   --    Bilirubin (UA) Negative   < > Negative  --  Negative Negative  --    Occult Blood UA Negative   < > Negative  --  TRACE Negative  --    Nitrite, UA Negative   < > Negative  --  Negative Negative  --    RBC, UA  --    < > 3   < > 3 1 >100 H   WBC, UA  --    < > 3   < > 5 1 >100 H   Bacteria  --    < > None   < > None Rare Many A   Hyaline Casts, UA  --    < > 0  --  0 0  --     < > = values in this interval not displayed.       Recent Labs   Lab 06/26/24  1322 06/27/24  0415 06/27/24  0539   POC PH 7.440 7.437 7.434   POC PCO2 39.0 35.8 34.3 L   POC HCO3 26.5 24.1 23.0 L   POC PO2 95 72 L 78 L   POC SATURATED O2 98 95 96   POC BE 2 0 -1   Sample ARTERIAL ARTERIAL ARTERIAL       Microbiology Results (last 7 days)       Procedure Component Value Units Date/Time    Blood culture [[id number]] Collected: 06/24/24 2016    Order Status: Completed Specimen: Blood from Peripheral, Hand, Right Updated: 06/26/24 2232     Blood Culture, Routine No Growth to date      No Growth to date      No Growth to date    Culture, Respiratory with Gram Stain [[id number]] Collected: 06/24/24 1848    Order Status: Completed Specimen: Respiratory from Sputum, Expectorated Updated: 06/26/24 0918     Respiratory Culture No normal respiratory lesley     Gram Stain (Respiratory) <10 epithelial cells per low power field.     Gram Stain (Respiratory) Few WBC's     Gram Stain (Respiratory) No organisms seen            Review of patient's allergies indicates:   Allergen Reactions    Percocet [oxycodone-acetaminophen] Itching    Amiodarone analogues      Itching      Irbesartan Swelling    Januvia [sitagliptin]     Jardiance [empagliflozin]      Leg cramps    Lipitor [atorvastatin] Other (See Comments)     Severe leg pain    Linaclotide Other (See Comments) and Nausea And Vomiting     Does not remember    Lubiprostone Other (See Comments) and Palpitations     Does not remember       Outpatient meds:  No current facility-administered  medications on file prior to encounter.     Current Outpatient Medications on File Prior to Encounter   Medication Sig Dispense Refill    allopurinoL (ZYLOPRIM) 300 MG tablet Take 1 tablet (300 mg total) by mouth once daily. 90 tablet 3    amLODIPine (NORVASC) 10 MG tablet Take 10 mg by mouth once daily.      calcitRIOL (ROCALTROL) 0.5 MCG Cap Take 0.5 mcg by mouth once daily.      carvediloL (COREG) 25 MG tablet Take 1 tablet (25 mg total) by mouth 2 (two) times daily. 180 tablet 2    cyanocobalamin (VITAMIN B-12) 100 MCG tablet Take 100 mcg by mouth once daily.      evolocumab (REPATHA SURECLICK) 140 mg/mL PnIj Inject 1 mL (140 mg total) into the skin every 14 (fourteen) days. 2 mL 11    furosemide (LASIX) 40 MG tablet Take 40 mg by mouth once daily.      loratadine (CLARITIN) 10 mg tablet Take 10 mg by mouth once daily.      magnesium oxide (MAG-OX) 400 mg (241.3 mg magnesium) tablet Take 1 tablet (400 mg total) by mouth daily as needed (cramping). 30 tablet 0    blood sugar diagnostic (TRUE METRIX GLUCOSE TEST STRIP) Strp Use 1x daily. Insurance preferred. 100 strip 3    blood sugar diagnostic Strp To check BG 1 time daily, to use with insurance preferred meter 100 strip 3    cloNIDine (CATAPRES) 0.1 MG tablet Take 1 tablet (0.1 mg total) by mouth 3 (three) times daily as needed (PRN SBP > 165 mmHg). 90 tablet 6    cloNIDine 0.1 mg/24 hr td ptwk (CATAPRES) 0.1 mg/24 hr Place 1 patch onto the skin every 7 days. 4 patch 4    lancets Misc To check BG 1 times daily, to use with insurance preferred meter 100 each 3    nitroGLYCERIN (NITROSTAT) 0.4 MG SL tablet Place 1 tablet (0.4 mg total) under the tongue every 5 (five) minutes as needed for Chest pain. 25 tablet 0    [DISCONTINUED] aspirin (ECOTRIN) 81 MG EC tablet Take 1 tablet (81 mg total) by mouth once daily. 90 tablet 3    [DISCONTINUED] DULoxetine (CYMBALTA) 20 MG capsule TAKE ONE CAPSULE BY MOUTH ONCE DAILY 30 capsule 4    [DISCONTINUED] folic acid (FOLVITE)  1 MG tablet Take 1 mg by mouth once daily.      [DISCONTINUED] metFORMIN (GLUCOPHAGE-XR) 500 MG ER 24hr tablet Take 2 tablets (1,000 mg total) by mouth 2 (two) times daily with meals. 360 tablet 3    [DISCONTINUED] sodium bicarbonate 650 MG tablet Take 1 tablet (650 mg total) by mouth once daily. 30 tablet 11       Scheduled meds:   cefTRIAXone (Rocephin) IV (PEDS and ADULTS)  1 g Intravenous Q24H    epoetin alaina-epbx  50 Units/kg Intravenous Every Mon, Wed, Fri    famotidine  20 mg Oral Daily    metoprolol tartrate  12.5 mg Oral BID    vancomycin (VANCOCIN) IV (PEDS and ADULTS)  500 mg Intravenous Once       Infusions:   amiodarone in dextrose 5%  1 mg/min Intravenous Continuous   Stopped at 06/24/24 2010    dexmedeTOMIDine (Precedex) infusion (titrating)  0-1.4 mcg/kg/hr Intravenous Continuous   Stopped at 06/26/24 2212    fentanyl  0-250 mcg/hr Intravenous Continuous 5 mL/hr at 06/27/24 0626 50 mcg/hr at 06/27/24 0626    NORepinephrine bitartrate-D5W  0-3 mcg/kg/min Intravenous Continuous   Stopped at 06/24/24 2215    propofoL  0-50 mcg/kg/min Intravenous Continuous 17.7 mL/hr at 06/27/24 0728 20 mcg/kg/min at 06/27/24 0728         PRN meds:    Current Facility-Administered Medications:     acetaminophen, 650 mg, Oral, Q8H PRN    aluminum-magnesium hydroxide-simethicone, 30 mL, Oral, QID PRN    dextrose 50%, 12.5 g, Intravenous, PRN    dextrose 50%, 25 g, Intravenous, PRN    glucagon (human recombinant), 1 mg, Intramuscular, PRN    glucose, 16 g, Oral, PRN    glucose, 24 g, Oral, PRN    melatonin, 6 mg, Oral, Nightly PRN    ondansetron, 4 mg, Intravenous, Q6H PRN    ondansetron, 4 mg, Intravenous, Daily PRN    Pharmacy to dose Vancomycin consult, , , Once **AND** vancomycin - pharmacy to dose, , Intravenous, pharmacy to manage frequency      OBJECTIVE:     Vital Signs and IO:  Temp:  [97.7 °F (36.5 °C)-99.1 °F (37.3 °C)]   Pulse:  [52-91]   Resp:  [22-56]   BP: (116-193)/()   SpO2:  [92 %-100 %]   I/O last  3 completed shifts:  In: 2751.5 [I.V.:1241.5; Other:1000; NG/GT:110; IV Piggyback:400]  Out: 6970 [Urine:970; Other:6000]  Wt Readings from Last 5 Encounters:   06/22/24 (!) 147.9 kg (326 lb 1 oz)   06/25/24 (!) 147.9 kg (326 lb)   06/19/24 132.5 kg (292 lb 1.8 oz)   06/04/24 134.3 kg (296 lb)   05/27/24 134.3 kg (296 lb)     Body mass index is 51.06 kg/m².    Physical Exam  Constitutional:       General: Patient is not in acute distress.     Appearance: Patient is well-developed. She is not diaphoretic.   HENT:      Head: Normocephalic and atraumatic.      Mouth/Throat: Mucous membranes are moist.   Eyes:      General: No scleral icterus.     Pupils: Pupils are equal, round, and reactive to light.   Cardiovascular:      Rate and Rhythm: Normal rate and regular rhythm.   Pulmonary:      Effort: Pulmonary effort is normal. No respiratory distress.      Breath sounds: No stridor.   Abdominal:      General: There is no distension.      Palpations: Abdomen is soft.   Musculoskeletal:         General: No deformity. Normal range of motion.      Cervical back: Neck supple.   Skin:     General: Skin is warm and dry.      Findings: No rash present. No erythema.   Neurological:      Mental Status: Patient is alert and oriented to person, place, and time.      Cranial Nerves: No cranial nerve deficit.   Psychiatric:         Behavior: Behavior normal.          Patient care time was spent personally by me on the following activities: > 35 min    Obtaining a history.  Examination of patient.  Providing medical care at the patients bedside.  Developing a treatment plan with patient or surrogate and bedside caregivers.  Ordering and reviewing laboratory studies, radiographic studies, pulse oximetry.  Ordering and performing treatments and interventions.  Evaluation of patient's response to treatment.  Discussions with consultants while on the unit and immediately available to the patient.  Re-evaluation of the patient's  condition.  Documentation in the medical record.     Hector Blakely MD    [facility]  [address] [place] [address] [id number] [phone] - tel  [phone] - fax    6/27/2024

## 2024-06-27 NOTE — CARE UPDATE
06/27/24 0723   Patient Assessment/Suction   Level of Consciousness (AVPU) alert   Respiratory Effort Normal;Unlabored   Expansion/Accessory Muscles/Retractions no retractions   All Lung Fields Breath Sounds wheezes, expiratory   Rhythm/Pattern, Respiratory depth regular;pattern regular;unlabored   Cough Frequency no cough   Cough Type assisted   Suction Method tracheal   Suction Pressure (mmHg) -120 mmHg   $ Suction Charges Inline Suction Procedure Stat Charge   PRE-TX-O2   Device (Oxygen Therapy) ventilator   $ Is the patient on High Flow Oxygen? Yes   Oxygen Concentration (%) 30   SpO2 (!) 94 %   Pulse Oximetry Type Continuous   $ Pulse Oximetry - Multiple Charge Pulse Oximetry - Multiple   Pulse 64   Resp (!) 22        Airway - Non-Surgical 06/24/24 1700 Endotracheal Tube   Placement Date/Time: 06/24/24 1700   Method of Intubation: Video Laryngoscopy  Inserted by: MD  Airway Device: Endotracheal Tube  Airway Device Size: 7.5  Style: Cuffed  Cuffed: Cuffed  Cuff Inflation: Minimal occlusive pressure  Cuff Inflated With: A...   Secured at 23 cm   Measured At Lips   Secured Location Center   Secured by Commercial tube quezada   Bite Block none   Site Condition Cool;Dry   Status Intact;Patent   Site Assessment Clean;Dry   Cuff Volume   (mlt)   Airway Safety   Is Ambu Bag and Mask with Patient? Yes, Adult Ambu Bag and Mask   Suction set is at the bedside? Yes   Vent Select   Conventional Vent Y   Charged w/in last 24h YES   Preset Conventional Ventilator Settings   Vent ID [id number]   Vent Type    Ventilation Type VC   Vent Mode A/C   Humidity HME   Set Rate 22 BPM   Vt Set 400 mL   PEEP/CPAP 6.4 cmH20   Waveform RAMP   Peak Flow 60 L/min   Trigger Sensitivity Flow/I-Trigger 3 L/min   Patient Ventilator Parameters   Resp Rate Total 22 br/min   Peak Airway Pressure 26 cmH20   Mean Airway Pressure 11 cmH20   Plateau Pressure 18 cmH20   Exhaled Vt 401 mL   Total Ve 8.83 L/m   I:E Ratio Measured 1:2.8   Tubing ID (mm)  7.5 mm   Tube Type ET   Conventional Ventilator Alarms   Alarms On Y   Ve High Alarm 25 L/min   Ve Low Alarm 5 L/min   Vt High Alarm 1200 mL   Vt Low Alarm 200 mL   Resp Rate High Alarm 40 br/min   Apnea Rate 14   Apnea Volume (mL) 0 mL   Apnea Oxygen Concentration  100   Apnea Flow Rate (L/min) 60   T Apnea 20 sec(s)   IHI Ventilator Associated Pneumonia Bundle (Required)   Daily Awakening Trials Performed Yes   Daily Assessment of Readiness to Extubate Yes   Head of Bed Elevated  HOB 30   Oral Care Mouth swabbed   Peptic Ulcer ProphylaxisProvided Peptic ulcer prophylaxis maintained   Vent Circut Breaks Minimized Yes   VTE Prophylaxis Provided VTE prophylaxis maintained   Ready to Wean/Extubation Screen   FIO2<=50 (chart decimal) 0.3   MV<16L (chart vol.) 8.83   PEEP <=8 (chart #) 6.4   Ready to Wean Parameters   F/VT Ratio<105 (RSBI) (!) 54.86   Education   $ Education 15 min;Ventilator Oxygen;Suction

## 2024-06-27 NOTE — PROGRESS NOTES
Pulmonary/Critical Care Progress Note      PATIENT NAME: Juhi Taylor  MRN: [id number]  TODAY'S DATE: 2024  5:28 PM  ADMIT DATE: 2024  AGE: 74 y.o. : 1950    CONSULT REQUESTED BY: Manuel Christensen MD    REASON FOR CONSULT:   Cardiac arrest     HPI:  Ms Taylor is a 73-year-old woman who presents with cardiac arrest.    Code blue was called on  evening, she was unresponsive.  Chest compressions were already underway.  Rhythm was consistent with PE a arrest initially, after giving epinephrine, the rhythm appeared to change to ventricular tachycardia.  We cardioverted and patient achieved ROSC.  Additionallly she received bicarbonate and glucose.     Today:   Did well with SBT- tolerating dialysis.     Review of Systems   Unable to perform ROS: Intubated           ALLERGIES  Review of patient's allergies indicates:   Allergen Reactions    Percocet [oxycodone-acetaminophen] Itching    Amiodarone analogues      Itching      Irbesartan Swelling    Januvia [sitagliptin]     Jardiance [empagliflozin]      Leg cramps    Lipitor [atorvastatin] Other (See Comments)     Severe leg pain    Linaclotide Other (See Comments) and Nausea And Vomiting     Does not remember    Lubiprostone Other (See Comments) and Palpitations     Does not remember       INPATIENT SCHEDULED MEDICATIONS   cefTRIAXone (Rocephin) IV (PEDS and ADULTS)  1 g Intravenous Q24H    epoetin alaina-epbx  50 Units/kg Intravenous Every Mon, Wed, Fri    famotidine  20 mg Oral Daily    metoprolol tartrate  12.5 mg Oral BID    vancomycin (VANCOCIN) IV (PEDS and ADULTS)  500 mg Intravenous Once      amiodarone in dextrose 5%  1 mg/min Intravenous Continuous   Stopped at 24    dexmedeTOMIDine (Precedex) infusion (titrating)  0-1.4 mcg/kg/hr Intravenous Continuous   Stopped at 24    fentanyl  0-250 mcg/hr Intravenous Continuous 5 mL/hr at 24 0959 50 mcg/hr at 24    NORepinephrine bitartrate-D5W  0-3  mcg/kg/min Intravenous Continuous   Stopped at 06/24/24 2215    propofoL  0-50 mcg/kg/min Intravenous Continuous 17.7 mL/hr at 06/27/24 0959 20 mcg/kg/min at 06/27/24 0959       MEDICAL AND SURGICAL HISTORY  Past Medical History:   Diagnosis Date    Anticoagulant long-term use     Arthritis     Breast cancer 2014    invasive lobular carcinoma    Cancer of kidney 11/2020    RIGHT KIDNEY CANCER    CHF (congestive heart failure)     Coronary artery disease dx 2005    Depression     Diabetes mellitus     Diastolic heart failure secondary to hypertension     Gout     Hyperlipemia     Hypertension     Hypertrophy of nasal turbinates     Kidney mass 2020    Right    Levoscoliosis     Lung nodule     left    Multiple thyroid nodules     NICOLE (obstructive sleep apnea)     uses C-PAP    Pulmonary hypertension      Past Surgical History:   Procedure Laterality Date    AORTOGRAPHY N/A 12/04/2020    Procedure: Aortogram;  Surgeon: Paul Pedersen MD;  Location: [facility] CATH;  Service: Cardiology;  Laterality: N/A;    BREAST SURGERY      CARDIAC CATHETERIZATION  12/2020    CHOLECYSTECTOMY      COLONOSCOPY      multi -last 2014     CORONARY ARTERY BYPASS GRAFT      ESOPHAGOGASTRODUODENOSCOPY      2012     HAND SURGERY Right     INSERTION OF CATHETER N/A 6/23/2024    Procedure: Insertion,catheter;  Surgeon: Meli Griffin MD;  Location: [facility] OR;  Service: Vascular;  Laterality: N/A;  ORIGINAL CATHETER WAS REPOSITIONED.  NO NEW CATHETER WAS PLACED    INSERTION, CATHETER, DIALYSIS, PERITONEAL N/A 6/4/2024    Procedure: IN Insertion Catheter, Dialysis, Peritoneal - laparoscopic;  Surgeon: Rodriguez Catalan Jr., MD;  Location: [facility] OR;  Service: General;  Laterality: N/A;    LAPAROSCOPIC ROBOT-ASSISTED SURGICAL REMOVAL OF KIDNEY USING DA CHELLE XI Right 03/10/2022    Procedure: XI ROBOTIC NEPHRECTOMY- radical;  Surgeon: Rolando Ramirez MD;  Location: [facility] OR;  Service: Urology;  Laterality: Right;    MASTECTOMY W/ SENTINEL NODE  "BIOPSY Bilateral 2015    bilateral "dog ears"    NASAL SINUS SURGERY      Dr Bryant FESS/cauterization turbinate     PARTIAL HYSTERECTOMY      PERCUTANEOUS TRANSLUMINAL BALLOON ANGIOPLASTY OF CORONARY ARTERY  2020    Procedure: Angioplasty-coronary;  Surgeon: Paul Pedersen MD;  Location: [facility] CATH;  Service: Cardiology;;    RENAL BIOPSY Right     2021 EJ    TUBAL LIGATION      ULTRASOUND GUIDANCE  2020    Procedure: Ultrasound Guidance;  Surgeon: Paul Pedersen MD;  Location: [facility] CATH;  Service: Cardiology;;       ALCOHOL, TOBACCO AND DRUG USE  Social History     Tobacco Use   Smoking Status Former    Current packs/day: 0.00    Types: Cigarettes    Start date: 2016    Quit date: 2016    Years since quittin.5   Smokeless Tobacco Never   Tobacco Comments    quit      Social History     Substance and Sexual Activity   Alcohol Use No     Social History     Substance and Sexual Activity   Drug Use No       FAMILY HISTORY  Family History   Problem Relation Name Age of Onset    Breast cancer Mother      Stroke Father Waqar Sr.     Hypertension Father Waqar Sr.     Hepatitis Brother      Asthma Daughter Nell Taylor     Birth defects Daughter Nell Camejo's two children has cleft lips    Depression Daughter Nell Taylor     Drug abuse Daughter Nell Taylor     Learning disabilities Daughter Nell Taylor     Mental illness Daughter Nell Taylor     Breast cancer Maternal Aunt      Glaucoma Sister      Drug abuse Daughter Nell     Macular degeneration Neg Hx      Retinal detachment Neg Hx         VITAL SIGNS (MOST RECENT)  Temp: 98.1 °F (36.7 °C) (24 0708)  Pulse: (!) 57 (24 1000)  Resp: (!) 22 (24 1000)  BP: (!) 143/64 (24 1000)  SpO2: (!) 92 % (24 1000)    INTAKE AND OUTPUT (LAST 24 HOURS):  Intake/Output Summary (Last 24 hours) at 2024 1003  Last data filed at 2024 0959  Gross per 24 hour " "  Intake 2071.46 ml   Output 4050 ml   Net -1978.54 ml       WEIGHT  Wt Readings from Last 1 Encounters:   06/22/24 (!) 147.9 kg (326 lb 1 oz)       Physical Exam  Vitals reviewed.   Constitutional:       General: She is not in acute distress.     Appearance: She is well-developed. She is obese. She is ill-appearing, toxic-appearing and diaphoretic.   HENT:      Head: Normocephalic and atraumatic.      Mouth/Throat:      Pharynx: No oropharyngeal exudate or posterior oropharyngeal erythema.   Eyes:      General: No scleral icterus.     Pupils: Pupils are equal, round, and reactive to light.   Neck:      Vascular: No JVD.   Cardiovascular:      Rate and Rhythm: Normal rate and regular rhythm.      Heart sounds: Normal heart sounds. No murmur heard.  Pulmonary:      Effort: Pulmonary effort is normal. No respiratory distress.      Breath sounds: Wheezing and rales present.   Abdominal:      General: Bowel sounds are normal. There is distension.      Palpations: Abdomen is soft.      Tenderness: There is no abdominal tenderness.   Musculoskeletal:         General: No swelling.      Cervical back: Normal range of motion and neck supple. No rigidity.      Right lower leg: Edema present.      Left lower leg: Edema present.   Skin:     General: Skin is warm.      Capillary Refill: Capillary refill takes less than 2 seconds.      Coloration: Skin is not pale.      Findings: No rash.   Neurological:      General: No focal deficit present.      Mental Status: She is alert and oriented to person, place, and time.      Cranial Nerves: No cranial nerve deficit.      Motor: No weakness or abnormal muscle tone.           ACUTE PHASE REACTANT (LAST 24 HOURS)  No results for input(s): "FERRITIN", "CRP", "LDH", "DDIMER" in the last 24 hours.    CBC LAST (LAST 24 HOURS)  Recent Labs   Lab 06/27/24  0310 06/27/24  0415 06/27/24  0539   WBC 10.33  --   --    RBC 4.22  --   --    HGB 10.7*  --   --    HCT 34.2*   < > 39   MCV 81*  --   " "--    MCH 25.4*  --   --    MCHC 31.3*  --   --    RDW 19.2*  --   --    *  --   --    MPV 9.6  --   --    GRAN 75.0*  7.8*  --   --    LYMPH 13.5*  1.4  --   --    MONO 8.2  0.9  --   --    BASO 0.02  --   --    NRBC 1*  --   --     < > = values in this interval not displayed.       CHEMISTRY LAST (LAST 24 HOURS)  Recent Labs   Lab 06/27/24  0045 06/27/24  0310 06/27/24  0415 06/27/24  0539   *  --   --   --    K 4.1  --   --   --      --   --   --    CO2 22*  --   --   --    ANIONGAP 10  --   --   --    BUN 41*  --   --   --    CREATININE 3.5*  --   --   --    *  --   --   --    CALCIUM 8.6*  --   --   --    PH  --   --    < > 7.434   MG 2.0 1.9  --   --    ALBUMIN 3.3*  --   --   --    PROT 6.0  --   --   --    ALKPHOS 90  --   --   --    ALT 5*  --   --   --    AST 25  --   --   --    BILITOT 1.0  --   --   --     < > = values in this interval not displayed.       COAGULATION LAST (LAST 24 HOURS)  No results for input(s): "LABPT", "INR", "APTT" in the last 24 hours.      CARDIAC PROFILE (LAST 24 HOURS)  Recent Labs   Lab 06/27/24  0045   *   TROPONINIHS 52.4*       LAST 7 DAYS MICROBIOLOGY   Microbiology Results (last 7 days)       Procedure Component Value Units Date/Time    Blood culture [[id number]] Collected: 06/24/24 2016    Order Status: Completed Specimen: Blood from Peripheral, Hand, Right Updated: 06/26/24 2232     Blood Culture, Routine No Growth to date      No Growth to date      No Growth to date    Culture, Respiratory with Gram Stain [[id number]] Collected: 06/24/24 1848    Order Status: Completed Specimen: Respiratory from Sputum, Expectorated Updated: 06/26/24 0918     Respiratory Culture No normal respiratory lesley     Gram Stain (Respiratory) <10 epithelial cells per low power field.     Gram Stain (Respiratory) Few WBC's     Gram Stain (Respiratory) No organisms seen            MOST RECENT IMAGING  X-Ray Chest AP Portable  Narrative: EXAMINATION:  XR CHEST " AP PORTABLE    CLINICAL HISTORY:  Ventilator;    COMPARISON:  June 26    FINDINGS:  Enlarged cardiac silhouette is unchanged.  The mediastinum is unremarkable.  Endotracheal tube, nasogastric tube, and left-sided central venous catheter remain in place.    Central pulmonary vasculature remains prominent, with increasing hazy increased density in the right mid and lower hemithorax.  There is no pneumothorax.  Impression: 1. Increasing hazy density at the right mid and lower lung zone, likely a combination of airspace disease and right-sided pleural effusion.  2. Stable enlarged cardiac silhouette and pulmonary vascular congestion.    Electronically signed by: Ricardo Roper  Date:    06/27/2024  Time:    07:00      CURRENT VISIT EKG  Results for orders placed or performed during the hospital encounter of 06/18/24   EKG 12-lead    Narrative    Ordered by an unspecified provider.       ECHOCARDIOGRAM RESULTS  Results for orders placed during the hospital encounter of 06/18/24    Echo    Interpretation Summary    Left Ventricle: The left ventricle is moderately dilated. Moderately increased wall thickness. There is moderate concentric hypertrophy. Moderate global hypokinesis present. There is mildly reduced systolic function with a visually estimated ejection fraction of 40 - 45%. Unable to assess diastolic function due to atrial fibrillation.    Right Ventricle: Normal right ventricular cavity size. Wall thickness is normal. Systolic function is borderline low.    Left Atrium: Left atrium is mildly dilated.    Right Atrium: Right atrium is mildly dilated.    Mitral Valve: There is mild regurgitation with a centrally directed jet.    Tricuspid Valve: There is mild regurgitation with a centrally directed jet.    Pulmonary Artery: There is moderate to severe pulmonary hypertension. The estimated pulmonary artery systolic pressure is 56 mmHg.    IVC/SVC: Elevated venous pressure at 15 mmHg.        VENTILATOR  INFORMATION  Vent Mode: A/C  Oxygen Concentration (%):  [30] 30  Resp Rate Total:  [20 br/min-38 br/min] 22 br/min  Vt Set:  [400 mL] 400 mL  PEEP/CPAP:  [3.6 cmH20-6.4 cmH20] 6.2 cmH20  Pressure Support:  [10 bfO70-48 cmH20] 10 cmH20  Mean Airway Pressure:  [8.9 prL85-43 cmH20] 11 cmH20           LAST ARTERIAL BLOOD GAS  ABG  Recent Labs   Lab 06/27/24  0539   PH 7.434   PO2 78*   PCO2 34.3*   HCO3 23.0*   BE -1       ASSESSMENT:   Cardiac arrest  Hyperkalemia   Hx of vascular disease   UTI  NICOLE and probable OHS   Chronic hypercapneic respiratory failure   7.   Ventricular tachycardia   8.  Thrombocytopenia     Still not clear on etiology of arrest but may be a NSTEMI precipitated by dialysis.  ECHO has evidence of reduced EF.     Neuro exam is promising today. She opens eyes and is moving extremities. Tolerating dialysis and is off of vasopressors.     Plan for extubation   PLAN:   - Continue broad spectrum antibiotics  - Extubate today to bipap   - Off vasopressors today  - Appreciate nephrology and cardiology consultants assistance   - Defer management of NSTEMI and EF to cardiology- not currently on anticoagulation for NSTEMI- will reach out to cardiology to see if they feel neccessary.   - Stop antibiotics we have not found infective focus  - After extubation you can eval swallowing  - Remove antony     Seymour Nicole MD  Date of Service: 06/27/2024  5:28 PM    Upon my evaluation, this patient had a high probability of imminent or life-threatening deterioration, which required my direct attention, intervention, and personal management.    I have personally provided at least 35 minutes of critical care time exclusive of time spent on separately billable procedures. Over 50% of the time of this encounter was spent in direct care at the bedside. Time includes review of laboratory data, radiology results, discussion with consultants, and monitoring for potential decompensation. Interventions were performed as  documented above.

## 2024-06-27 NOTE — CARE UPDATE
PT SUCCESSFULLY PASSED SAT/SBT, FOLLOWED COMMANDS, AND PASSED PRE AND POST SBT ABG. WILL RETURN PT TO RATE AND SEDATION TO ALLOW MD TO ASSESS BEDSIDE AND DETERMINE SECRETION MANAGEMENT AND AIRWAY PROTECTION.           06/27/24 0539   PRE-TX-O2   Oxygen Concentration (%) 30   SpO2 (!) 94 %   Pulse 73   Resp (!) 22   Vent Select   Charged w/in last 24h YES   Preset Conventional Ventilator Settings   Ventilation Type VC   Vent Mode Spont   Set Rate 22 BPM   Vt Set 400 mL   PEEP/CPAP 5.8 cmH20   Pressure Support 10 cmH20   Waveform RAMP   Peak Flow 60 L/min   Insp Rise Time  40 %   Trigger Sensitivity Flow/I-Trigger 3 L/min   Patient Ventilator Parameters   Resp Rate Total 23 br/min   Peak Airway Pressure 16 cmH20   Mean Airway Pressure 9.1 cmH20   Exhaled Vt 374 mL   Total Ve 9.62 L/m   Spont Ve 9.62 L   I:E Ratio Measured 1:2.1   Conventional Ventilator Alarms   Ve High Alarm 25 L/min   Ve Low Alarm 5 L/min   Resp Rate High Alarm 40 br/min   Apnea Rate 14   Apnea Volume (mL) 0 mL   Apnea Oxygen Concentration  100   Apnea Flow Rate (L/min) 60   T Apnea 20 sec(s)   Ready to Wean/Extubation Screen   FIO2<=50 (chart decimal) 0.3   MV<16L (chart vol.) 9.62   PEEP <=8 (chart #) 5.8   Ready to Wean Parameters   F/VT Ratio<105 (RSBI) (!) 58.82   SBT Results Pass   Extubated? No   Reason Not Extubated? Other (see comments)  (PERFORMED SAT/SBT SUCCESSFULLY. WILL ALLOW FOR MD TO ASSESS BEDSIDE AND DETERMINE CLINICAL PATH FORWARD.)   Labs   $ Was an ABG obtained? Arterial Puncture;POCT - Blood gas;POCT - Calcium;POCT - Hematocrit;POCT - PH, Blood;POCT - Potassium;POCT - Sodium   $ Labs Tech Time 15 min   Critical Value Communication   Date Result Received 06/27/24   Time Result Received 0539   Resulting Department of Critical Value RESP   Who communicated critical value from resulting department? CMSII, RRT   Name of Notified Physician/Ld CAMP RN   Date Notified 06/27/24   Time Notified 0540   Physician Directive PASSED  SBT, RETURNED TO RATE AND SEDATION

## 2024-06-27 NOTE — PLAN OF CARE
Problem: Adult Inpatient Plan of Care  Goal: Plan of Care Review  Outcome: Progressing  Pt found restless, aggressively moving all extremities at start of shift. Propofol restarted and titrated per order. Pt remained agitated notified MD, orders received to start Fentanyl gtt.     Pt opening eyes spontaneously, following some commands. SBT done today, pt tolerated well. Damon intact, poor UO. Restraints in place. Safety maintained.

## 2024-06-27 NOTE — PROGRESS NOTES
Pharmacokinetic Assessment Follow Up: IV Vancomycin    Vancomycin serum concentration assessment(s):    The random level was drawn correctly and can be used to guide therapy at this time. The measurement is within the desired definitive target range of 10 to 15 mcg/mL.    Vancomycin Regimen Plan:    Give Vancomycin 500 mg once and Re-dose when the random level is less than 15 mcg/mL, next level to be drawn at 0430 on 06/28    Drug levels (last 3 results):  Recent Labs   Lab Result Units 06/26/24  0302 06/27/24  0310   Vancomycin, Random ug/mL 12.4 14.0       Pharmacy will continue to follow and monitor vancomycin.    Please contact pharmacy at extension [phone] for questions regarding this assessment.    Thank you for the consult,   John Salter       Patient brief summary:  Juhi Taylor is a 74 y.o. female initiated on antimicrobial therapy with IV Vancomycin for treatment of lower respiratory infection    Drug Allergies:   Review of patient's allergies indicates:   Allergen Reactions    Percocet [oxycodone-acetaminophen] Itching    Amiodarone analogues      Itching      Irbesartan Swelling    Januvia [sitagliptin]     Jardiance [empagliflozin]      Leg cramps    Lipitor [atorvastatin] Other (See Comments)     Severe leg pain    Linaclotide Other (See Comments) and Nausea And Vomiting     Does not remember    Lubiprostone Other (See Comments) and Palpitations     Does not remember       Actual Body Weight:   147.9 kg    Renal Function:   Estimated Creatinine Clearance: 22 mL/min (A) (based on SCr of 3.5 mg/dL (H)).,     CBC (last 72 hours):  Recent Labs   Lab Result Units 06/24/24  0543 06/24/24 2016 06/25/24  0321 06/26/24  0302 06/27/24  0045 06/27/24  0310   WBC K/uL 8.76 11.11 8.35 8.03 9.59 10.33   Hemoglobin g/dL 11.8* 11.0* 10.5* 11.6* 11.3* 10.7*   Hematocrit % 41.6 37.5 34.1* 39.1 36.3* 34.2*   Platelets K/uL 188 170 153 87* 112* 122*   Gran % % 65.1 75.0* 61.8 64.7 77.7* 75.0*   Lymph % % 21.9 13.7*  24.4 20.8 11.2* 13.5*   Mono % % 10.2 9.4 10.3 10.3 8.2 8.2   Eosinophil % % 1.8 1.2 2.6 3.6 2.3 1.8   Basophil % % 0.2 0.2 0.2 0.4 0.2 0.2   Differential Method  Automated Automated Automated Automated Automated Automated       Metabolic Panel (last 72 hours):  Recent Labs   Lab Result Units 06/24/24  0543 06/24/24 2016 06/25/24  0321 06/26/24  0302 06/27/24  0045 06/27/24  0310   Sodium mmol/L 137 136 136 137 134*  --    Potassium mmol/L 5.4* 4.1 3.9 4.8 4.1  --    Chloride mmol/L 107 103 103 104 102  --    CO2 mmol/L 22* 22* 23 21* 22*  --    Glucose mg/dL 124* 211* 104 79 138*  --    BUN mg/dL 107* 85* 91* 63* 41*  --    Creatinine mg/dL 5.5* 4.8* 5.1* 4.2* 3.5*  --    Albumin g/dL  --  2.8*  --   --  3.3*  --    Total Bilirubin mg/dL  --  0.7  --   --  1.0  --    Alkaline Phosphatase U/L  --  84  --   --  90  --    AST U/L  --  19  --   --  25  --    ALT U/L  --  11  --   --  5*  --    Magnesium mg/dL 2.6  --  2.4 2.3 2.0 1.9   Phosphorus mg/dL 6.0*  --   --   --  3.7  --        Vancomycin Administrations:  vancomycin given in the last 96 hours                     vancomycin 500 mg in dextrose 5 % 100 mL IVPB (ready to mix) (mg) 500 mg New Bag 06/26/24 2018    vancomycin (VANCOCIN) 2,000 mg in D5W 500 mL IVPB (mg) 2,000 mg New Bag 06/24/24 2045                    Microbiologic Results:  Microbiology Results (last 7 days)       Procedure Component Value Units Date/Time    Blood culture [[id number]] Collected: 06/24/24 2016    Order Status: Completed Specimen: Blood from Peripheral, Hand, Right Updated: 06/26/24 2232     Blood Culture, Routine No Growth to date      No Growth to date      No Growth to date    Culture, Respiratory with Gram Stain [[id number]] Collected: 06/24/24 1848    Order Status: Completed Specimen: Respiratory from Sputum, Expectorated Updated: 06/26/24 0918     Respiratory Culture No normal respiratory lesley     Gram Stain (Respiratory) <10 epithelial cells per low power field.     Gram  Stain (Respiratory) Few WBC's     Gram Stain (Respiratory) No organisms seen

## 2024-06-27 NOTE — PROGRESS NOTES
06/27/24 1815   Required for all Hemodialysis Patients   Hepatitis Status negative   Handoff Report   Given To Gonzalo CHAUDHARY   Treatment Type   Treatment Type Acute   Vital Signs   Temp 97.9 °F (36.6 °C)   Temp Source Oral   Pulse 80   SpO2 (!) 90 %   BP (!) 170/72   MAP (mmHg) 104   Assessments (Pre/Post)   Consent Obtained yes   Safety vein preservation armband present   Date Hepatitis Profile Obtained 05/31/24   Blood Liters Processed (BLP) 50.5   Transport Modality bed   Level of Consciousness (AVPU) alert   Dialyzer Clearance mildly streaked   Pain   Preferred Pain Scale rFLACC (Revised Face Legs Arms Cry Consolability Scale)   Comfort/Acceptable Pain Level 2   Pain Rating (0-10): Rest 0   Pain Rating (0-10): Activity 0   FACES Pain Rating: Rest 0-->no hurt   rFLACC Pain Rating: - Face 0-->no particular expression or smile   rFLACC Pain Rating: - Legs 0-->normal position or relaxed   rFLACC Pain Rating: - Activity 0-->lying quietly, normal position, moves easily   rFLACC Pain Rating: - Cry 0-->no cry (awake or asleep)   rFLACC Pain Rating: - Consolability 0-->content, relaxed   rFLACC Score: 0   Pre-Hemodialysis Assessment   Treatment Status Completed        Hemodialysis Catheter 06/22/24 1130 left internal jugular   Placement Date/Time: 06/22/24 1130   Location: left internal jugular  Placement Verification: X-ray   Venous Patency/Care normal saline locked   Arterial Patency/Care normal saline locked   During Hemodialysis Assessment   Intake (mL) 250 mL   Intra-Hemodialysis Comments Tx ended per protocol   Post-Hemodialysis Assessment   Rinseback Volume (mL) 250 mL   Blood Volume Processed (Liters) 50.4 L   Dialyzer Clearance Lightly streaked   Duration of Treatment 180 minutes   Additional Fluid Intake (mL) 500 mL   Total UF (mL) 4500 mL   Net Fluid Removal 4000   Patient Response to Treatment Tolerated tx   Post-Treatment Weight 142.8 kg (314 lb 13.1 oz)   Treatment Weight Change -4   Post-Hemodialysis Comments  Tx ended per protocol   Edema   Edema generalized

## 2024-06-27 NOTE — PLAN OF CARE
Patient found lying in bed on ventilator.    Damon draining small amount of clear yellow urine ~200ml/ shift so far.    On volume control ppeak 26, pplat 18, just small amount of secretions.    Hd catheter to left lower abd, secured in place overnight with sterile gauze overlying.    There is oozing and clot around the trialysis line to left chest, I cleaned with several chg swabs aseptic technique and replaced sterile chg dressing and labeled. No active or brisk oozing there so far. I heard a replacement stitch had been placed previously.    The belly is soft but hypoactive bowel sounds.    With generalized bruising, most to right upper arm, left chest.    Still with significant edema bilat legs, but I am told it is better than yesterday. Elevated.      I had heard that when sedative infusions were going thru midline right upper arm patient was restless overnight / swinging legs over the bed, and they had started additional iv to right shoulder which then patient was subdued. The midline to right arm does not aspirate blood. In fact when I flush it with saline, I can then aspirate just saline (but not the whole 10ml). I dont see the catheter in the vessel very well with ultrasound but there is fluid in tissue planes in the upper arm. However it is not tight or edematous or leaking. There is nothing currently hooked up to it. I took a picture and shown md (given no further direction), elevated, and removed the midline. Started additional Ivs 20ga x1 left forearm ultrasound guided x1 attempt patient tolerated no site complications, aseptic technique. The patient has had bilat mastectomies and so no real given side to stick, had already had iv to left ac (removed because it was old now), and discussed w/ md. Wanted to avoid putting piv distal to right upper arm infiltrate.    The NG tube in place was on high suction and there was small amount of blood streaking in tube (however brown in the canister, shown to md.)  just clamped.    The bed is low and alarm on. Clinical alarms reviewed and armed. Monitor strip printed and reviewed, sinus pelon 50s with bundle branch block. Blood pressure not actionable 140s/60s.    Turning q2h, lines and extremities padded, heels floated, mepilexes in place.    ...  Extubated to bipap. She wore it for a little while but wanted to take it off. Taken off oral care and to talk to her but then she doesn't want it back on. Discussed w/ md. Saturating ok on room air, no c/o dyspnea.    Seek bowel regimen. (Lactulose is ordered now and will give)    Antony removed await void.    Demarcated bruising to left chest w/ estrellitaie.    Abrasion noted right buttock, offloaded.  ...  Voided s/p antony removal but unmeasured, was on  pad. Couldn't find anything on bladder scan    Having somewhat frequent loose stools now s/p lactulose, brown. Pericare done    The midline to right arm had infiltrated several hours prior to my shift and wasn't currently being used on my arrival (I removed it), had discussed w/ md no specific intervention advised.    Nurses Note -- 4 Eyes      6/27/2024   3:41 PM      Skin assessed during: Daily Assessment      [] No Altered Skin Integrity Present    []Prevention Measures Documented      [x] Yes- Altered Skin Integrity Present or Discovered   [x] LDA Added if Not in Epic (Describe Wound)   [x] New Altered Skin Integrity was Present on Admit and Documented in LDA   [x] Wound Image Taken    Wound Care Consulted? Yes    Attending Nurse:  Eric Strickland RN/Staff Member:  [email or web address]

## 2024-06-27 NOTE — CARE UPDATE
06/27/24 1128   PRE-TX-O2   SpO2 100 %   Pulse 68   Resp (!) 29   BP (!) 171/80   IHI Ventilator Associated Pneumonia Bundle (Required)   Daily Awakening Trials Performed Yes   Daily Assessment of Readiness to Extubate Yes   Ready to Wean Parameters   $ Extubation Tech Time Tech Time 15 min;Extubation w/ Parameters  (to BIPAP)   SBT Safety Screen Pass   Spon. Breathing Trial Initiated? Initiated   Sedation Vacation Completed? Yes   Cough Reflex? Yes   SBT Results Pass   Extubated? Yes   Reason for Extubation Extubated   Ventilator Discontinued Yes   Preset CPAP/BiPAP Settings   Mode Of Delivery BiPAP   $ CPAP/BiPAP Daily Charge BiPAP/CPAP Daily   $ Initial CPAP/BiPAP Setup? Yes   $ Is patient using? Yes   Size of Mask Small/Medium   Sized Appropriately? Yes   Equipment Type V60   Airway Device Type small full face mask   Ipap 15   EPAP (cm H2O) 5   Pressure Support (cm H2O) 10   Set Rate (Breaths/Min) 15   ITime (sec) 1   Rise Time (sec) 3   Patient CPAP/BiPAP Settings   Timed Inspiration (Sec) 1   IPAP Rise Time (sec) 3   RR Total (Breaths/Min) 20   Tidal Volume (mL) 775   VE Minute Ventilation (L/min) 15.8 L/min   Peak Inspiratory Pressure (cm H2O) 15   TiTOT (%) 35   Total Leak (L/Min) 5   Patient Trigger - ST Mode Only (%) 100   CPAP/BiPAP Alarms   High Pressure (cm H2O) 40   Low Pressure (cm H2O) 5   Minute Ventilation (L/Min) 3   High RR (breaths/min) 40   Low RR (breaths/min) 5   Apnea (Sec) 20   Education   $ Education 15 min;BiPAP     Extubated to BIPAP per Dr. Nicole.

## 2024-06-27 NOTE — CARE UPDATE
06/26/24 1953   Patient Assessment/Suction   Level of Consciousness (AVPU) responds to pain   Respiratory Effort Normal;Unlabored   Expansion/Accessory Muscles/Retractions no use of accessory muscles;no retractions;expansion symmetric   All Lung Fields Breath Sounds coarse;diminished   Rhythm/Pattern, Respiratory assisted mechanically   Skin Integrity   $ Wound Care Tech Time 15 min   Area Observed Lower lip;Upper lip;Corner lip   Skin Appearance without discoloration   PRE-TX-O2   Device (Oxygen Therapy) ventilator   Oxygen Concentration (%) 30   SpO2 97 %   Pulse Oximetry Type Continuous   Pulse 70   Resp (!) 24        Airway - Non-Surgical 06/24/24 1700 Endotracheal Tube   Placement Date/Time: 06/24/24 1700   Method of Intubation: Video Laryngoscopy  Inserted by: MD  Airway Device: Endotracheal Tube  Airway Device Size: 7.5  Style: Cuffed  Cuffed: Cuffed  Cuff Inflation: Minimal occlusive pressure  Cuff Inflated With: A...   Secured at 23 cm   Measured At Lips   Secured Location Left   Secured by Commercial tube quezada   Position of ETT in xRay In good position   Bite Block none   Site Condition Cool;Dry   Status Intact;Secured;Patent   Site Assessment Clean;Dry;No bleeding;No drainage   General Safety Checklist   Safety Promotion/Fall Prevention side rails raised   Airway Safety   Is Ambu Bag and Mask with Patient? Yes, Adult Ambu Bag and Mask   Suction set is at the bedside? Yes   ETT Size 7.5   Vent Select   Conventional Vent Y   Charged w/in last 24h YES   Preset Conventional Ventilator Settings   Vent ID [id number]   Vent Type    Ventilation Type VC   Vent Mode A/C   Humidity HME   Set Rate 22 BPM   Vt Set 400 mL   PEEP/CPAP 6.1 cmH20   Waveform RAMP   Peak Flow 60 L/min   Trigger Sensitivity Flow/I-Trigger 3 L/min   Patient Ventilator Parameters   Resp Rate Total 25 br/min   Peak Airway Pressure 23 cmH20   Mean Airway Pressure 11 cmH20   Exhaled Vt 379 mL   Total Ve 9.93 L/m   I:E Ratio Measured 1:2.6    Tubing ID (mm) 7.5 mm   Tube Type ET   Conventional Ventilator Alarms   Alarms On Y   Ve High Alarm 25 L/min   Ve Low Alarm 5 L/min   Resp Rate High Alarm 40 br/min   Apnea Rate 14   Apnea Volume (mL) 0 mL   Apnea Oxygen Concentration  100   Apnea Flow Rate (L/min) 60   T Apnea 20 sec(s)   Ready to Wean/Extubation Screen   FIO2<=50 (chart decimal) 0.3   MV<16L (chart vol.) 9.93   PEEP <=8 (chart #) 6.1   Ready to Wean Parameters   F/VT Ratio<105 (RSBI) (!) 63.32

## 2024-06-27 NOTE — PROGRESS NOTES
"[facility]  Department of Cardiology  Progress Note      PATIENT NAME: Juhi Taylor  MRN: [id number]  TODAY'S DATE: 06/27/2024  ADMIT DATE: 6/18/2024                          CONSULT REQUESTED BY: Manuel Christensen MD    SUBJECTIVE     PRINCIPAL PROBLEM: Chronic kidney disease (CKD), stage V      REASON FOR CONSULT:  cardiac arrest, bradycardia, elevated troponin    INTERVAL HISTORY:  6/27/24  Patient evaluated with multiple family at the bedside  She is groggy but awake; extubated today   Platelet level improved today, 122  Normal sinus rhythm with occasional PVCs on telemetry  Troponin 52 this a.m.    06/26/24  BP stable overnight off pressor   Remains intubated, she is waking up off sedation  Platelets dropped to 87;      HPI:  Patient was 74-year-old female who has been hospitalized since 06/18/2024.  She was status post cardiac arrest, PEA during dialysis yesterday evening.  She is intubated and unable to provide any history.  Strips reviewed from time event showed brief run of V-tach    "Code blue was called on June 24th evening, she was unresponsive. Chest compressions were already underway. Rhythm was consistent with PE a arrest initially, after giving epinephrine, the rhythm appeared to change to ventricular tachycardia. We cardioverted and patient achieved ROSC.  Additionallly she received bicarbonate and glucose. "      Review of patient's allergies indicates:   Allergen Reactions    Percocet [oxycodone-acetaminophen] Itching    Amiodarone analogues      Itching      Irbesartan Swelling    Januvia [sitagliptin]     Jardiance [empagliflozin]      Leg cramps    Lipitor [atorvastatin] Other (See Comments)     Severe leg pain    Linaclotide Other (See Comments) and Nausea And Vomiting     Does not remember    Lubiprostone Other (See Comments) and Palpitations     Does not remember       Past Medical History:   Diagnosis Date    Anticoagulant long-term use     Arthritis     Breast cancer 2014 " "   invasive lobular carcinoma    Cancer of kidney 11/2020    RIGHT KIDNEY CANCER    CHF (congestive heart failure)     Coronary artery disease dx 2005    Depression     Diabetes mellitus     Diastolic heart failure secondary to hypertension     Gout     Hyperlipemia     Hypertension     Hypertrophy of nasal turbinates     Kidney mass 2020    Right    Levoscoliosis     Lung nodule     left    Multiple thyroid nodules     NICOLE (obstructive sleep apnea)     uses C-PAP    Pulmonary hypertension      Past Surgical History:   Procedure Laterality Date    AORTOGRAPHY N/A 12/04/2020    Procedure: Aortogram;  Surgeon: Paul Pedersen MD;  Location: [facility] CATH;  Service: Cardiology;  Laterality: N/A;    BREAST SURGERY      CARDIAC CATHETERIZATION  12/2020    CHOLECYSTECTOMY      COLONOSCOPY      multi -last 2014     CORONARY ARTERY BYPASS GRAFT      ESOPHAGOGASTRODUODENOSCOPY      2012     HAND SURGERY Right     INSERTION OF CATHETER N/A 6/23/2024    Procedure: Insertion,catheter;  Surgeon: Meli Griffin MD;  Location: [facility] OR;  Service: Vascular;  Laterality: N/A;  ORIGINAL CATHETER WAS REPOSITIONED.  NO NEW CATHETER WAS PLACED    INSERTION, CATHETER, DIALYSIS, PERITONEAL N/A 6/4/2024    Procedure: IN Insertion Catheter, Dialysis, Peritoneal - laparoscopic;  Surgeon: Rodriguez Catalan Jr., MD;  Location: [facility] OR;  Service: General;  Laterality: N/A;    LAPAROSCOPIC ROBOT-ASSISTED SURGICAL REMOVAL OF KIDNEY USING DA CHELLE XI Right 03/10/2022    Procedure: XI ROBOTIC NEPHRECTOMY- radical;  Surgeon: Rolando Ramirez MD;  Location: [facility] OR;  Service: Urology;  Laterality: Right;    MASTECTOMY W/ SENTINEL NODE BIOPSY Bilateral 01/21/2015    bilateral "dog ears"    NASAL SINUS SURGERY  2015    Dr Bryant FESS/cauterization turbinate     PARTIAL HYSTERECTOMY  1989    PERCUTANEOUS TRANSLUMINAL BALLOON ANGIOPLASTY OF CORONARY ARTERY  12/04/2020    Procedure: Angioplasty-coronary;  Surgeon: Paul Pedersen MD;  Location: [facility] " CATH;  Service: Cardiology;;    RENAL BIOPSY Right     2021 EJ    TUBAL LIGATION      ULTRASOUND GUIDANCE  2020    Procedure: Ultrasound Guidance;  Surgeon: Paul Pedersen MD;  Location: [facility] CATH;  Service: Cardiology;;     Social History     Tobacco Use    Smoking status: Former     Current packs/day: 0.00     Types: Cigarettes     Start date: 2016     Quit date: 2016     Years since quittin.5    Smokeless tobacco: Never    Tobacco comments:     quit    Substance Use Topics    Alcohol use: No    Drug use: No        REVIEW OF SYSTEMS  Negative except as mentioned in HPI    OBJECTIVE     VITAL SIGNS (Most Recent)  Temp: 98.1 °F (36.7 °C) (24)  Pulse: (!) 56 (24)  Resp: (!) 22 (24)  BP: (!) 150/65 (24)  SpO2: (!) 92 % (24)    VENTILATION STATUS  Resp: (!) 22 (24)  SpO2: (!) 92 % (24)  Vent Mode: A/C  Oxygen Concentration (%):  [30] 30  Resp Rate Total:  [20 br/min-38 br/min] 22 br/min  Vt Set:  [400 mL] 400 mL  PEEP/CPAP:  [3.6 cmH20-6.4 cmH20] 6.2 cmH20  Pressure Support:  [10 qiT26-30 cmH20] 10 cmH20  Mean Airway Pressure:  [8.9 dvS29-02 cmH20] 11 cmH20        I & O (Last 24H):  Intake/Output Summary (Last 24 hours) at 2024 0954  Last data filed at 2024 0926  Gross per 24 hour   Intake 2015.64 ml   Output 4050 ml   Net -.36 ml       WEIGHTS  Wt Readings from Last 3 Encounters:   24 0443 (!) 147.9 kg (326 lb 1 oz)   24 1927 (!) 147.3 kg (324 lb 11.8 oz)   24 1202 132.5 kg (292 lb)   24 1009 (!) 147.9 kg (326 lb)   24 0910 132.5 kg (292 lb 1.8 oz)       PHYSICAL EXAM    GENERAL:  Morbidly obese female resting comfortably in bed   HEENT: Normocephalic.  NG tube in place  NECK: No JVD.   CARDIAC: Regular rate and rhythm.  CHEST ANATOMY:  Left chest wall dialysis catheter in place  LUNGS:  Diminished breath sounds, breathing comfortably on low-flow O2 via nasal  cannula; occasional cough  ABDOMEN: Soft, obese hypoactive bowel sounds.    EXTREMITIES:  pitting edema lower extremities and feet  CENTRAL NERVOUS SYSTEM:  Awake but groggy as she was on propofol when intubated  Damon cath in place draining small amount of yellow urine  Tele: Sinus rhythm PVCs, heart rate in the 70s      HOME MEDICATIONS:  No current facility-administered medications on file prior to encounter.     Current Outpatient Medications on File Prior to Encounter   Medication Sig Dispense Refill    allopurinoL (ZYLOPRIM) 300 MG tablet Take 1 tablet (300 mg total) by mouth once daily. 90 tablet 3    amLODIPine (NORVASC) 10 MG tablet Take 10 mg by mouth once daily.      calcitRIOL (ROCALTROL) 0.5 MCG Cap Take 0.5 mcg by mouth once daily.      carvediloL (COREG) 25 MG tablet Take 1 tablet (25 mg total) by mouth 2 (two) times daily. 180 tablet 2    cyanocobalamin (VITAMIN B-12) 100 MCG tablet Take 100 mcg by mouth once daily.      evolocumab (REPATHA SURECLICK) 140 mg/mL PnIj Inject 1 mL (140 mg total) into the skin every 14 (fourteen) days. 2 mL 11    furosemide (LASIX) 40 MG tablet Take 40 mg by mouth once daily.      loratadine (CLARITIN) 10 mg tablet Take 10 mg by mouth once daily.      magnesium oxide (MAG-OX) 400 mg (241.3 mg magnesium) tablet Take 1 tablet (400 mg total) by mouth daily as needed (cramping). 30 tablet 0    blood sugar diagnostic (TRUE METRIX GLUCOSE TEST STRIP) Strp Use 1x daily. Insurance preferred. 100 strip 3    blood sugar diagnostic Strp To check BG 1 time daily, to use with insurance preferred meter 100 strip 3    cloNIDine (CATAPRES) 0.1 MG tablet Take 1 tablet (0.1 mg total) by mouth 3 (three) times daily as needed (PRN SBP > 165 mmHg). 90 tablet 6    cloNIDine 0.1 mg/24 hr td ptwk (CATAPRES) 0.1 mg/24 hr Place 1 patch onto the skin every 7 days. 4 patch 4    lancets Misc To check BG 1 times daily, to use with insurance preferred meter 100 each 3    nitroGLYCERIN (NITROSTAT) 0.4  MG SL tablet Place 1 tablet (0.4 mg total) under the tongue every 5 (five) minutes as needed for Chest pain. 25 tablet 0    [DISCONTINUED] aspirin (ECOTRIN) 81 MG EC tablet Take 1 tablet (81 mg total) by mouth once daily. 90 tablet 3    [DISCONTINUED] DULoxetine (CYMBALTA) 20 MG capsule TAKE ONE CAPSULE BY MOUTH ONCE DAILY 30 capsule 4    [DISCONTINUED] folic acid (FOLVITE) 1 MG tablet Take 1 mg by mouth once daily.      [DISCONTINUED] metFORMIN (GLUCOPHAGE-XR) 500 MG ER 24hr tablet Take 2 tablets (1,000 mg total) by mouth 2 (two) times daily with meals. 360 tablet 3    [DISCONTINUED] sodium bicarbonate 650 MG tablet Take 1 tablet (650 mg total) by mouth once daily. 30 tablet 11       SCHEDULED MEDS:   cefTRIAXone (Rocephin) IV (PEDS and ADULTS)  1 g Intravenous Q24H    epoetin alaina-epbx  50 Units/kg Intravenous Every Mon, Wed, Fri    famotidine  20 mg Oral Daily    metoprolol tartrate  12.5 mg Oral BID    vancomycin (VANCOCIN) IV (PEDS and ADULTS)  500 mg Intravenous Once       CONTINUOUS INFUSIONS:   amiodarone in dextrose 5%  1 mg/min Intravenous Continuous   Stopped at 06/24/24 2010    dexmedeTOMIDine (Precedex) infusion (titrating)  0-1.4 mcg/kg/hr Intravenous Continuous   Stopped at 06/26/24 2212    fentanyl  0-250 mcg/hr Intravenous Continuous 5 mL/hr at 06/27/24 0926 50 mcg/hr at 06/27/24 0926    NORepinephrine bitartrate-D5W  0-3 mcg/kg/min Intravenous Continuous   Stopped at 06/24/24 2215    propofoL  0-50 mcg/kg/min Intravenous Continuous 17.7 mL/hr at 06/27/24 0926 20 mcg/kg/min at 06/27/24 0926       PRN MEDS:  Current Facility-Administered Medications:     acetaminophen, 650 mg, Oral, Q8H PRN    aluminum-magnesium hydroxide-simethicone, 30 mL, Oral, QID PRN    dextrose 50%, 12.5 g, Intravenous, PRN    dextrose 50%, 25 g, Intravenous, PRN    glucagon (human recombinant), 1 mg, Intramuscular, PRN    glucose, 16 g, Oral, PRN    glucose, 24 g, Oral, PRN    melatonin, 6 mg, Oral, Nightly PRN    ondansetron,  4 mg, Intravenous, Q6H PRN    ondansetron, 4 mg, Intravenous, Daily PRN    Pharmacy to dose Vancomycin consult, , , Once **AND** vancomycin - pharmacy to dose, , Intravenous, pharmacy to manage frequency    LABS AND DIAGNOSTICS     CBC LAST 3 DAYS  Recent Labs   Lab 06/26/24 0302 06/26/24 0411 06/27/24  0045 06/27/24 0310 06/27/24 0415 06/27/24  0539   WBC 8.03  --  9.59 10.33  --   --    RBC 4.57  --  4.36 4.22  --   --    HGB 11.6*  --  11.3* 10.7*  --   --    HCT 39.1   < > 36.3* 34.2* 40 39   MCV 84  --  83 81*  --   --    MCH 25.4*  --  25.9* 25.4*  --   --    MCHC 29.7*  --  31.1* 31.3*  --   --    RDW 19.8*  --  18.9* 19.2*  --   --    PLT 87*  --  112* 122*  --   --    MPV 9.7  --  10.8 9.6  --   --    GRAN 64.7  5.2  --  77.7*  7.5 75.0*  7.8*  --   --    LYMPH 20.8  1.7  --  11.2*  1.1 13.5*  1.4  --   --    MONO 10.3  0.8  --  8.2  0.8 8.2  0.9  --   --    BASO 0.03  --  0.02 0.02  --   --    NRBC 0  --  0 1*  --   --     < > = values in this interval not displayed.       COAGULATION LAST 3 DAYS  Recent Labs   Lab 06/24/24 2016   INR 1.1       CHEMISTRY LAST 3 DAYS  Recent Labs   Lab 06/23/24 0442 06/24/24  0543 06/24/24 2016 06/25/24 0321 06/25/24  0519 06/26/24 0302 06/26/24 0411 06/26/24  1322 06/27/24 0045 06/27/24 0310 06/27/24 0415 06/27/24  0539      < > 136 136  --  137  --   --  134*  --   --   --    K 5.3*   < > 4.1 3.9  --  4.8  --   --  4.1  --   --   --       < > 103 103  --  104  --   --  102  --   --   --    CO2 22*   < > 22* 23  --  21*  --   --  22*  --   --   --    ANIONGAP 9   < > 11 10  --  12  --   --  10  --   --   --    BUN 90*   < > 85* 91*  --  63*  --   --  41*  --   --   --    CREATININE 4.6*   < > 4.8* 5.1*  --  4.2*  --   --  3.5*  --   --   --    *   < > 211* 104  --  79  --   --  138*  --   --   --    CALCIUM 9.1   < > 8.6* 8.4*  --  8.7  --   --  8.6*  --   --   --    PH  --    < >  --   --    < >  --    < > 7.440  --   --  7.437  "7.434   MG 2.4   < >  --  2.4  --  2.3  --   --  2.0 1.9  --   --    ALBUMIN 3.1*  --  2.8*  --   --   --   --   --  3.3*  --   --   --    PROT 5.8*  --  5.3*  --   --   --   --   --  6.0  --   --   --    ALKPHOS 85  --  84  --   --   --   --   --  90  --   --   --    ALT 14  --  11  --   --   --   --   --  5*  --   --   --    AST 16  --  19  --   --   --   --   --  25  --   --   --    BILITOT 0.7  --  0.7  --   --   --   --   --  1.0  --   --   --     < > = values in this interval not displayed.       CARDIAC PROFILE LAST 3 DAYS  Recent Labs   Lab 06/27/24  0045   *       ENDOCRINE LAST 3 DAYS  No results for input(s): "TSH", "PROCAL" in the last 168 hours.      LAST ARTERIAL BLOOD GAS  ABG  Recent Labs   Lab 06/27/24  0539   PH 7.434   PO2 78*   PCO2 34.3*   HCO3 23.0*   BE -1       LAST 7 DAYS MICROBIOLOGY   Microbiology Results (last 7 days)       Procedure Component Value Units Date/Time    Blood culture [[id number]] Collected: 06/24/24 2016    Order Status: Completed Specimen: Blood from Peripheral, Hand, Right Updated: 06/26/24 2232     Blood Culture, Routine No Growth to date      No Growth to date      No Growth to date    Culture, Respiratory with Gram Stain [[id number]] Collected: 06/24/24 1848    Order Status: Completed Specimen: Respiratory from Sputum, Expectorated Updated: 06/26/24 0918     Respiratory Culture No normal respiratory lesley     Gram Stain (Respiratory) <10 epithelial cells per low power field.     Gram Stain (Respiratory) Few WBC's     Gram Stain (Respiratory) No organisms seen            MOST RECENT IMAGING  X-Ray Chest AP Portable  Narrative: EXAMINATION:  XR CHEST AP PORTABLE    CLINICAL HISTORY:  Ventilator;    COMPARISON:  June 26    FINDINGS:  Enlarged cardiac silhouette is unchanged.  The mediastinum is unremarkable.  Endotracheal tube, nasogastric tube, and left-sided central venous catheter remain in place.    Central pulmonary vasculature remains prominent, with " increasing hazy increased density in the right mid and lower hemithorax.  There is no pneumothorax.  Impression: 1. Increasing hazy density at the right mid and lower lung zone, likely a combination of airspace disease and right-sided pleural effusion.  2. Stable enlarged cardiac silhouette and pulmonary vascular congestion.    Electronically signed by: Ricardo Roper  Date:    06/27/2024  Time:    07:00      ECHOCARDIOGRAM RESULTS (last 5)  Results for orders placed during the hospital encounter of 06/18/24    Echo    Interpretation Summary    Left Ventricle: The left ventricle is moderately dilated. Moderately increased wall thickness. There is moderate concentric hypertrophy. Moderate global hypokinesis present. There is mildly reduced systolic function with a visually estimated ejection fraction of 40 - 45%. Unable to assess diastolic function due to atrial fibrillation.    Right Ventricle: Normal right ventricular cavity size. Wall thickness is normal. Systolic function is borderline low.    Left Atrium: Left atrium is mildly dilated.    Right Atrium: Right atrium is mildly dilated.    Mitral Valve: There is mild regurgitation with a centrally directed jet.    Tricuspid Valve: There is mild regurgitation with a centrally directed jet.    Pulmonary Artery: There is moderate to severe pulmonary hypertension. The estimated pulmonary artery systolic pressure is 56 mmHg.    IVC/SVC: Elevated venous pressure at 15 mmHg.      Results for orders placed during the hospital encounter of 08/20/23    Echo    Interpretation Summary    Left Ventricle: The left ventricle is normal in size. Increased ventricular mass. Moderately increased wall thickness. Mild global hypokinesis present. There is mildly reduced systolic function with a visually estimated ejection fraction of 40 - 50%.  EF ~ 45%% Grade I diastolic dysfunction. Elevated left ventricular filling pressure. Tissue Doppler velocity is reduced.    Right Ventricle:  Normal right ventricular cavity size. Systolic function is normal.    Mitral Valve: There is no stenosis. The mean pressure gradient across the mitral valve is 3 mmHg at a heart rate of  bpm.    IVC/SVC: Normal venous pressure at 3 mmHg.    Left ventricle hypertrophy with depressed ejection fraction and elevation of mean left atrial pressure      Results for orders placed during the hospital encounter of 11/19/21    Echo    Interpretation Summary  · The estimated ejection fraction is 35-40%.  · The left ventricle is moderately enlarged with moderate concentric hypertrophy  · Severe left atrial enlargement.  · Normal right ventricular size with normal right ventricular systolic function.  · Mild-to-moderate mitral regurgitation.  · Mild tricuspid regurgitation.  · The estimated PA systolic pressure is 44 mmHg.  · There is pulmonary hypertension.      Results for orders placed during the hospital encounter of 11/30/20    Echo Color Flow Doppler? Yes    Interpretation Summary  · There is left ventricular concentric hypertrophy.  · With low normal systolic function. The estimated ejection fraction is 50%.  · Grade II diastolic dysfunction.  · With normal right ventricular systolic function.  · Severe left atrial enlargement.  · Mild right atrial enlargement.  · Mild mitral regurgitation.  · Mild to moderate tricuspid regurgitation.  · Normal central venous pressure (3 mmHg).  · The estimated PA systolic pressure is 51 mmHg.  · There is pulmonary hypertension.      Results for orders placed in visit on 07/06/18    Transthoracic echo (TTE) complete    Interpretation Summary  · Left ventricle ejection fraction is mildly decreased at 45%  · Grade I (mild) left ventricular diastolic dysfunction consistent with impaired relaxation.  · Mitral valve shows mild regurgitation.      CURRENT/PREVIOUS VISIT EKG  Results for orders placed or performed during the hospital encounter of 06/18/24   EKG 12-lead    Collection Time:  06/26/24  1:46 AM   Result Value Ref Range    QRS Duration 136 ms    [facility] QTC Calculation 482 ms    Narrative    Test Reason : R00.1,    Vent. Rate : 055 BPM     Atrial Rate : 055 BPM     P-R Int : 154 ms          QRS Dur : 136 ms      QT Int : 504 ms       P-R-T Axes : 034 -11 189 degrees     QTc Int : 482 ms    Sinus bradycardia with occasional Premature ventricular complexes  Right bundle branch block  LVH with repolarization abnormality  Possible Lateral infarct ,age undetermined  Abnormal ECG  When compared with ECG of 25-JUN-2024 00:30,  Right bundle branch block is now Present    Referred By: MICHELLE   SELF           Confirmed By:            ASSESSMENT/PLAN:     Active Hospital Problems    Diagnosis    *Chronic kidney disease (CKD), stage V    Cardiac arrest    CHF (congestive heart failure)    Acute hypoxic respiratory failure    Morbid obesity    Renal cell carcinoma of right kidney    Paroxysmal atrial fibrillation    Type 2 diabetes mellitus with hyperglycemia    Coronary artery disease    Essential hypertension       ASSESSMENT & PLAN:   Cardiac arrest  Ventricular tachycardia  Left bundle branch block  Cardiomyopathy  Acute hypoxic respiratory failure  CAD  H/O PCI x3 in 2020  Hypertension  Congestive heart failure  Paroxysmal atrial fibrillation  Diabetes mellitus 2  CKD stage 5/ESRD  Morbid Obesity  Renal cell carcinoma of right kidney -right nephrectomy  Thrombocytopenia      RECOMMENDATIONS:  -Patient is status post PEA/V-tach arrest on 06/24/2024  -Pressors have been weaned off and she was extubated today  -Echocardiogram showed drop in EF to 20-25% (reduced EF since 2021)  -Left bundle-branch block noted on EKG.  -Pt will need ICD implanted once recovers from pulmonary standpoint. This would need to be done at [facility]    -Platelets improved today, Monitor CBC daily  -Recommend start Eliquis 2.5 mg b.i.d. for anticoagulation in presence of PAF.  Patient states she does not want to do  this nor start Plavix.  Discussed the risks of not being anticoagulated with her extensive history with patient and her family.    -Rhythm has been sinus rhythm with PVCs, no recurrent VT.    Started Lopressor yesterday, increase Lopressor to 12.5 mg TID today  -Family reports patient had bad experience w/ amiodarone previously.  Amiodarone is listed as an allergy on her chart    -Closely monitor electrolytes daily. K+ is 4.1 & Magnesium 1.9 today  -further recommendations per Dr. Trejo  -multiple family was present for discussion on plan of care    Ramila Lucia NP  [facility]  Department of Cardiology  Date of Service: 06/27/2024        I have personally interviewed and examined the patient, I have reviewed and discussed the Nurse Practitioner's history and physical, assessment, and plan. I agree with the findings and plan.     Patient has been extubated and she does say that she does understand.   Patient is refusing amiodarone and apparently she had refused amiodarone in the past.    Patient is tolerating metoprolol tartrate 12.5 mg p.o. b.I.d. will increase it to t.I.d. dosing today and her ectopy has decreased significantly.  Discussed with patient's daughters and patient in regards with cardiac catheterization and coronary angiography and patient is refusing for cardiac catheterization and coronary angiography.   Patient's platelets have improved her current platelets a 122 and consideration for starting her on anticoagulation with Eliquis but patient is refusing Eliquis.  And she is also refusing anticoagulation with heparin.   Consideration for Plavix 75 mg p.o. daily.   Discussed with patient's daughters who were present in the room in regards with her current management.          Dr. Dennis Trejo M.D.  [facility]  Department of Cardiology  Date of Service: 06/27/2024  2:29 PM

## 2024-06-27 NOTE — ASSESSMENT & PLAN NOTE
Chronic, controlled. Latest blood pressure and vitals reviewed-     Temp:  [97.7 °F (36.5 °C)-99.1 °F (37.3 °C)]   Pulse:  [56-91]   Resp:  [19-56]   BP: (132-192)/()   SpO2:  [90 %-100 %] .   Home meds for hypertension were reviewed and noted below.   Hypertension Medications               carvediloL (COREG) 25 MG tablet Take 1 tablet (25 mg total) by mouth 2 (two) times daily.    cloNIDine (CATAPRES) 0.1 MG tablet Take 1 tablet (0.1 mg total) by mouth 3 (three) times daily as needed (PRN SBP > 165 mmHg).    cloNIDine 0.1 mg/24 hr td ptwk (CATAPRES) 0.1 mg/24 hr Place 1 patch onto the skin every 7 days.    furosemide (LASIX) 40 MG tablet Take 40 mg by mouth once daily.    amLODIPine (NORVASC) 10 MG tablet Take 10 mg by mouth once daily.    nitroGLYCERIN (NITROSTAT) 0.4 MG SL tablet Place 1 tablet (0.4 mg total) under the tongue every 5 (five) minutes as needed for Chest pain.            While in the hospital, will manage blood pressure as follows; Continue home antihypertensive regimen    Will utilize p.r.n. blood pressure medication only if patient's blood pressure greater than 140/90 and she develops symptoms such as worsening chest pain or shortness of breath.

## 2024-06-27 NOTE — ASSESSMENT & PLAN NOTE
Rhythm PEA. Now bradycardiac   Possible cardiac cause . Cardiology on the case   Cannot give blood thinners. Oozing from tunnel catheter  and thrombocytopenia  Cardiology follow up

## 2024-06-27 NOTE — PROGRESS NOTES
[facility] Medicine  Progress Note    Patient Name: Juhi Taylor  MRN: [id number]  Patient Class: IP- Inpatient   Admission Date: 6/18/2024  Length of Stay: 8 days  Attending Physician: Manuel Christensen MD  Primary Care Provider: Tony Sadler MD        Subjective:     Principal Problem:Chronic kidney disease (CKD), stage V        HPI:  73 year old pt getting admitted with fluid overload in need of dialysis  Pt had PD catheter insertion 2 weeks ago  She was supposed to start on PD for ESRD but got delayed due to insurance issues  She has oliguria/increased weight gain in past 2 weeks  Also SOB on exertion and cough  Symptoms got worse and she came to ER and got admitted  Pt was started on lasix gtt iv         Overview/Hospital Course:  Patient is a 73-year-old female who was admitted for acute hypoxic respiratory failure likely multifactorial from CKD and CHF .  Patient had PD cath placed 2 weeks ago however due to insurance situation never received dialysis. EF 40 -45% with elevated BNP. Patient was placed on Furosemide and metolazone with poor urine output eventually leading to dialysis.Nephrology is following. Discontinued antibiotics since infection was ruled out.     Interval History:     Seen in am   Small amount of blood in OG tube . Planning to extubate today   Platelet better . CXR with air space disease but appear fluid     Review of Systems  Objective:     Vital Signs (Most Recent):  Temp: 97.7 °F (36.5 °C) (06/27/24 1215)  Pulse: 66 (06/27/24 1300)  Resp: 20 (06/27/24 1300)  BP: (!) 166/77 (06/27/24 1300)  SpO2: (!) 91 % (06/27/24 1300) Vital Signs (24h Range):  Temp:  [97.7 °F (36.5 °C)-99.1 °F (37.3 °C)] 97.7 °F (36.5 °C)  Pulse:  [56-91] 66  Resp:  [19-56] 20  SpO2:  [90 %-100 %] 91 %  BP: (132-192)/() 166/77     Weight: (!) 147.9 kg (326 lb 1 oz)  Body mass index is 51.06 kg/m².    Intake/Output Summary (Last 24 hours) at 6/27/2024 1315  Last data filed  at 6/27/2024 1238  Gross per 24 hour   Intake 1571.46 ml   Output 650 ml   Net 921.46 ml         Physical Exam  Vitals and nursing note reviewed.   HENT:      Head: Normocephalic and atraumatic.      Nose: Nose normal.   Eyes:      Conjunctiva/sclera: Conjunctivae normal.   Neck:      Vascular: No JVD.   Cardiovascular:      Rate and Rhythm: Normal rate.      Heart sounds: Normal heart sounds.   Pulmonary:      Effort: Pulmonary effort is normal.      Comments: decrease  Abdominal:      Palpations: Abdomen is soft.   Skin:     General: Skin is warm.   Neurological:      Comments: On vent              Significant Labs: All pertinent labs within the past 24 hours have been reviewed.  CBC:   Recent Labs   Lab 06/26/24  0302 06/26/24  0411 06/27/24  0045 06/27/24  0310 06/27/24  0415 06/27/24  0539   WBC 8.03  --  9.59 10.33  --   --    HGB 11.6*  --  11.3* 10.7*  --   --    HCT 39.1   < > 36.3* 34.2* 40 39   PLT 87*  --  112* 122*  --   --     < > = values in this interval not displayed.     CMP:   Recent Labs   Lab 06/26/24  0302 06/27/24  0045    134*   K 4.8 4.1    102   CO2 21* 22*   GLU 79 138*   BUN 63* 41*   CREATININE 4.2* 3.5*   CALCIUM 8.7 8.6*   PROT  --  6.0   ALBUMIN  --  3.3*   BILITOT  --  1.0   ALKPHOS  --  90   AST  --  25   ALT  --  5*   ANIONGAP 12 10       Significant Imaging: I have reviewed all pertinent imaging results/findings within the past 24 hours.    Assessment/Plan:      * Chronic kidney disease (CKD), stage V  Patient had PD cath placed 2 weeks ago however due to insurance situation never received dialysis  Tunneled dialysis catheter was placed 6/22  Revision 6/23  Patient has persistent oozing from dialysis catheter yesterday and DDAVP given and no stopped     Cardiac arrest  Rhythm PEA. Now bradycardiac   Possible cardiac cause . Cardiology on the case   Cannot give blood thinners. Oozing from tunnel catheter  and thrombocytopenia  Cardiology follow up       CHF (congestive  "heart failure)  EF 25% with elevated BNP  Will need lifevest and AICD as OP   S/p tunnel line and nephrology will do HD daily for now   Amiodarone was on hold for bradycardia  Heparin drip was on hold for thrombocytopenia    Morbid obesity  Body mass index is 51.06 kg/m². Morbid obesity complicates all aspects of disease management from diagnostic modalities to treatment.     Acute hypoxic respiratory failure  Patient has severe fluid overload secondary to ESRD, cardiac arrest in HD hemodialysis  Likely multifactorial from CHF with worsening CKD  EF 40-45%  in previous ECHO but repeat with 25% with elevated BNP  HD daily for now as per nephrology   Status post tunnel line 6/22  Intubated with cardiac arrest 6/24 and possible extubation today       Renal cell carcinoma of right kidney  Aware       Paroxysmal atrial fibrillation  Chronic stable issue  Continue carvedilol, Holding home apixaban/hepain drip  for now  No more oozing from HD tunnel catheter      Type 2 diabetes mellitus with hyperglycemia  Patient's FSGs are controlled on current medication regimen.  Last A1c reviewed-   Lab Results   Component Value Date    HGBA1C 6.8 (H) 06/19/2024     Most recent fingerstick glucose reviewed- No results for input(s): "POCTGLUCOSE" in the last 24 hours.  Current correctional scale  Medium  Maintain anti-hyperglycemic dose as follows-   Antihyperglycemics (From admission, onward)      None          Hold Oral hypoglycemics while patient is in the hospital.    Essential hypertension  Chronic, controlled. Latest blood pressure and vitals reviewed-     Temp:  [97.7 °F (36.5 °C)-99.1 °F (37.3 °C)]   Pulse:  [56-91]   Resp:  [19-56]   BP: (132-192)/()   SpO2:  [90 %-100 %] .   Home meds for hypertension were reviewed and noted below.   Hypertension Medications               carvediloL (COREG) 25 MG tablet Take 1 tablet (25 mg total) by mouth 2 (two) times daily.    cloNIDine (CATAPRES) 0.1 MG tablet Take 1 tablet (0.1 mg " total) by mouth 3 (three) times daily as needed (PRN SBP > 165 mmHg).    cloNIDine 0.1 mg/24 hr td ptwk (CATAPRES) 0.1 mg/24 hr Place 1 patch onto the skin every 7 days.    furosemide (LASIX) 40 MG tablet Take 40 mg by mouth once daily.    amLODIPine (NORVASC) 10 MG tablet Take 10 mg by mouth once daily.    nitroGLYCERIN (NITROSTAT) 0.4 MG SL tablet Place 1 tablet (0.4 mg total) under the tongue every 5 (five) minutes as needed for Chest pain.            While in the hospital, will manage blood pressure as follows; Continue home antihypertensive regimen    Will utilize p.r.n. blood pressure medication only if patient's blood pressure greater than 140/90 and she develops symptoms such as worsening chest pain or shortness of breath.    Coronary artery disease  Stable  Continue with medical management  + troponin . Unsure arrest secondary to cardiac cause . H/o a fib noted  Heparin drip was on hold for thrombocytopenia  Amiodarone also on hold      VTE Risk Mitigation (From admission, onward)           Ordered     IP VTE HIGH RISK PATIENT  Once         06/18/24 1519     Place sequential compression device  Until discontinued         06/18/24 1519                    Discharge Planning   HARINI:      Code Status: Full Code   Is the patient medically ready for discharge?:     Reason for patient still in hospital (select all that apply): Treatment  Discharge Plan A: Rehab            Critical care time spent on the evaluation and treatment of severe organ dysfunction, review of pertinent labs and imaging studies, discussions with consulting providers and discussions with patient/family: 23 minutes.      Manuel Christensen MD  Department of Hospital Medicine   [facility]

## 2024-06-27 NOTE — PROGRESS NOTES
Pharmacy Consult Discontinuation: Vancomycin    Juhi Taylor [id number] is a 74 y.o. female was consulted for vancomycin pharmacotherapy management by pharmacy.    Pharmacy consult for vancomycin dosing is no longer required.  Vancomycin was discontinued 06/27/2024.    Thank you for allowing us to participate in this patient's care. Should you have any questions or concerns please feel free to contact the pharmacy department at [phone].    James Elmore, AlanD

## 2024-06-27 NOTE — NURSING
Nurses Note -- 4 Eyes      6/27/2024   3:18 AM      Skin assessed during: Daily Assessment      [] No Altered Skin Integrity Present    []Prevention Measures Documented      [x] Yes- Altered Skin Integrity Present or Discovered   [] LDA Added if Not in Epic (Describe Wound)   [] New Altered Skin Integrity was Present on Admit and Documented in LDA   [] Wound Image Taken    Wound Care Consulted? No    Attending Nurse:  Viji Strickland RN/Staff Member:  Apolonia CHAUDHARY

## 2024-06-28 LAB
ALBUMIN SERPL BCP-MCNC: 2.9 G/DL (ref 3.5–5.2)
ALP SERPL-CCNC: 86 U/L (ref 55–135)
ALT SERPL W/O P-5'-P-CCNC: 6 U/L (ref 10–44)
ANION GAP SERPL CALC-SCNC: 8 MMOL/L (ref 8–16)
AST SERPL-CCNC: 17 U/L (ref 10–40)
BASOPHILS # BLD AUTO: 0.03 K/UL (ref 0–0.2)
BASOPHILS NFR BLD: 0.2 % (ref 0–1.9)
BILIRUB SERPL-MCNC: 1 MG/DL (ref 0.1–1)
BUN SERPL-MCNC: 30 MG/DL (ref 8–23)
CALCIUM SERPL-MCNC: 8.6 MG/DL (ref 8.7–10.5)
CHLORIDE SERPL-SCNC: 104 MMOL/L (ref 95–110)
CO2 SERPL-SCNC: 25 MMOL/L (ref 23–29)
CREAT SERPL-MCNC: 3.2 MG/DL (ref 0.5–1.4)
DIFFERENTIAL METHOD BLD: ABNORMAL
EOSINOPHIL # BLD AUTO: 0.2 K/UL (ref 0–0.5)
EOSINOPHIL NFR BLD: 1.7 % (ref 0–8)
ERYTHROCYTE [DISTWIDTH] IN BLOOD BY AUTOMATED COUNT: 19.3 % (ref 11.5–14.5)
EST. GFR  (NO RACE VARIABLE): 14.8 ML/MIN/1.73 M^2
GLUCOSE SERPL-MCNC: 120 MG/DL (ref 70–110)
GLUCOSE SERPL-MCNC: 86 MG/DL (ref 70–110)
GLUCOSE SERPL-MCNC: 87 MG/DL (ref 70–110)
GLUCOSE SERPL-MCNC: 88 MG/DL (ref 70–110)
GLUCOSE SERPL-MCNC: 91 MG/DL (ref 70–110)
HCT VFR BLD AUTO: 35.2 % (ref 37–48.5)
HGB BLD-MCNC: 10.6 G/DL (ref 12–16)
IMM GRANULOCYTES # BLD AUTO: 0.1 K/UL (ref 0–0.04)
IMM GRANULOCYTES NFR BLD AUTO: 0.7 % (ref 0–0.5)
LYMPHOCYTES # BLD AUTO: 1.5 K/UL (ref 1–4.8)
LYMPHOCYTES NFR BLD: 10.6 % (ref 18–48)
MAGNESIUM SERPL-MCNC: 1.9 MG/DL (ref 1.6–2.6)
MCH RBC QN AUTO: 25.5 PG (ref 27–31)
MCHC RBC AUTO-ENTMCNC: 30.1 G/DL (ref 32–36)
MCV RBC AUTO: 85 FL (ref 82–98)
MONOCYTES # BLD AUTO: 1.3 K/UL (ref 0.3–1)
MONOCYTES NFR BLD: 9.4 % (ref 4–15)
NEUTROPHILS # BLD AUTO: 11 K/UL (ref 1.8–7.7)
NEUTROPHILS NFR BLD: 77.4 % (ref 38–73)
NRBC BLD-RTO: 0 /100 WBC
PLATELET # BLD AUTO: 137 K/UL (ref 150–450)
PMV BLD AUTO: 9.9 FL (ref 9.2–12.9)
POTASSIUM SERPL-SCNC: 3.5 MMOL/L (ref 3.5–5.1)
PROT SERPL-MCNC: 5.7 G/DL (ref 6–8.4)
RBC # BLD AUTO: 4.16 M/UL (ref 4–5.4)
SODIUM SERPL-SCNC: 137 MMOL/L (ref 136–145)
WBC # BLD AUTO: 14.22 K/UL (ref 3.9–12.7)

## 2024-06-28 PROCEDURE — 25000003 PHARM REV CODE 250: Performed by: INTERNAL MEDICINE

## 2024-06-28 PROCEDURE — 27000221 HC OXYGEN, UP TO 24 HOURS

## 2024-06-28 PROCEDURE — 99233 SBSQ HOSP IP/OBS HIGH 50: CPT | Mod: ,,, | Performed by: INTERNAL MEDICINE

## 2024-06-28 PROCEDURE — 94660 CPAP INITIATION&MGMT: CPT

## 2024-06-28 PROCEDURE — 85025 COMPLETE CBC W/AUTO DIFF WBC: CPT | Performed by: INTERNAL MEDICINE

## 2024-06-28 PROCEDURE — 99900031 HC PATIENT EDUCATION (STAT)

## 2024-06-28 PROCEDURE — 36415 COLL VENOUS BLD VENIPUNCTURE: CPT | Performed by: INTERNAL MEDICINE

## 2024-06-28 PROCEDURE — 90935 HEMODIALYSIS ONE EVALUATION: CPT

## 2024-06-28 PROCEDURE — 92610 EVALUATE SWALLOWING FUNCTION: CPT

## 2024-06-28 PROCEDURE — 94761 N-INVAS EAR/PLS OXIMETRY MLT: CPT

## 2024-06-28 PROCEDURE — 63600175 PHARM REV CODE 636 W HCPCS: Performed by: INTERNAL MEDICINE

## 2024-06-28 PROCEDURE — 25000003 PHARM REV CODE 250: Performed by: NURSE PRACTITIONER

## 2024-06-28 PROCEDURE — 20000000 HC ICU ROOM

## 2024-06-28 PROCEDURE — 92526 ORAL FUNCTION THERAPY: CPT

## 2024-06-28 PROCEDURE — 51798 US URINE CAPACITY MEASURE: CPT

## 2024-06-28 PROCEDURE — 80053 COMPREHEN METABOLIC PANEL: CPT | Performed by: INTERNAL MEDICINE

## 2024-06-28 PROCEDURE — 97164 PT RE-EVAL EST PLAN CARE: CPT

## 2024-06-28 PROCEDURE — 63600175 PHARM REV CODE 636 W HCPCS: Mod: JZ,EC,JG | Performed by: INTERNAL MEDICINE

## 2024-06-28 PROCEDURE — 99232 SBSQ HOSP IP/OBS MODERATE 35: CPT | Mod: ,,, | Performed by: INTERNAL MEDICINE

## 2024-06-28 PROCEDURE — 25000003 PHARM REV CODE 250: Performed by: STUDENT IN AN ORGANIZED HEALTH CARE EDUCATION/TRAINING PROGRAM

## 2024-06-28 PROCEDURE — 83735 ASSAY OF MAGNESIUM: CPT | Performed by: INTERNAL MEDICINE

## 2024-06-28 PROCEDURE — 99900035 HC TECH TIME PER 15 MIN (STAT)

## 2024-06-28 RX ORDER — METOPROLOL TARTRATE 25 MG/1
25 TABLET, FILM COATED ORAL 2 TIMES DAILY
Status: DISCONTINUED | OUTPATIENT
Start: 2024-06-28 | End: 2024-06-29

## 2024-06-28 RX ORDER — METOPROLOL TARTRATE 1 MG/ML
INJECTION, SOLUTION INTRAVENOUS
Status: COMPLETED
Start: 2024-06-28 | End: 2024-06-29

## 2024-06-28 RX ORDER — METOPROLOL TARTRATE 1 MG/ML
5 INJECTION, SOLUTION INTRAVENOUS ONCE
Status: COMPLETED | OUTPATIENT
Start: 2024-06-29 | End: 2024-06-28

## 2024-06-28 RX ORDER — HEPARIN SODIUM 5000 [USP'U]/ML
5000 INJECTION, SOLUTION INTRAVENOUS; SUBCUTANEOUS EVERY 8 HOURS
Status: DISCONTINUED | OUTPATIENT
Start: 2024-06-28 | End: 2024-07-03 | Stop reason: HOSPADM

## 2024-06-28 RX ORDER — METOPROLOL TARTRATE 25 MG/1
25 TABLET, FILM COATED ORAL 3 TIMES DAILY
Status: DISCONTINUED | OUTPATIENT
Start: 2024-06-28 | End: 2024-06-28

## 2024-06-28 RX ORDER — AMIODARONE HYDROCHLORIDE 200 MG/1
200 TABLET ORAL DAILY
Status: DISCONTINUED | OUTPATIENT
Start: 2024-06-28 | End: 2024-06-28

## 2024-06-28 RX ADMIN — FAMOTIDINE 20 MG: 20 TABLET ORAL at 09:06

## 2024-06-28 RX ADMIN — SODIUM PHOSPHATE 1 ENEMA: 7; 19 ENEMA RECTAL at 07:06

## 2024-06-28 RX ADMIN — Medication 6 MG: at 08:06

## 2024-06-28 RX ADMIN — METOPROLOL TARTRATE 12.5 MG: 25 TABLET, FILM COATED ORAL at 09:06

## 2024-06-28 RX ADMIN — METOPROLOL TARTRATE 25 MG: 25 TABLET, FILM COATED ORAL at 08:06

## 2024-06-28 RX ADMIN — EPOETIN ALFA-EPBX 7400 UNITS: 10000 INJECTION, SOLUTION INTRAVENOUS; SUBCUTANEOUS at 06:06

## 2024-06-28 RX ADMIN — HEPARIN SODIUM 5000 UNITS: 5000 INJECTION, SOLUTION INTRAVENOUS; SUBCUTANEOUS at 06:06

## 2024-06-28 RX ADMIN — METOPROLOL TARTRATE 12.5 MG: 25 TABLET, FILM COATED ORAL at 05:06

## 2024-06-28 RX ADMIN — METOPROLOL TARTRATE 5 MG: 1 INJECTION, SOLUTION INTRAVENOUS at 11:06

## 2024-06-28 NOTE — PHYSICIAN QUERY
Please further specify the heart failure diagnosis.     Acute on Chronic Combined Systolic and Diastolic Heart Failure

## 2024-06-28 NOTE — ASSESSMENT & PLAN NOTE
Chronic stable issue  Continue lopressor Holding home apixaban/hepain drip  for now  No more oozing from HD tunnel catheter

## 2024-06-28 NOTE — PROGRESS NOTES
[facility] Medicine  Progress Note    Patient Name: Juhi Taylor  MRN: [id number]  Patient Class: IP- Inpatient   Admission Date: 6/18/2024  Length of Stay: 9 days  Attending Physician: Manuel Christensen MD  Primary Care Provider: Tony Sadler MD        Subjective:     Principal Problem:Chronic kidney disease (CKD), stage V        HPI:  73 year old pt getting admitted with fluid overload in need of dialysis  Pt had PD catheter insertion 2 weeks ago  She was supposed to start on PD for ESRD but got delayed due to insurance issues  She has oliguria/increased weight gain in past 2 weeks  Also SOB on exertion and cough  Symptoms got worse and she came to ER and got admitted  Pt was started on lasix gtt iv         Overview/Hospital Course:  Patient is a 73-year-old female who was admitted for acute hypoxic respiratory failure likely multifactorial from CKD and CHF .  Patient had PD cath placed 2 weeks ago however due to insurance situation never received dialysis. EF 40 -45% with elevated BNP. Patient was placed on Furosemide and metolazone with poor urine output eventually leading to dialysis.Nephrology is following. Discontinued antibiotics since infection was ruled out.     Interval History:     Patient is sitting with a cardiac chair.  Lungs exam is difficult but appeared to be no active wheezing  No more signs of bleeding at the dialysis catheter.  Platelet improved  Currently on nasal cannula  Review of Systems  Objective:     Vital Signs (Most Recent):  Temp: 98.2 °F (36.8 °C) (06/28/24 0400)  Pulse: 80 (06/28/24 0723)  Resp: (!) 24 (06/28/24 0723)  BP: (!) 150/69 (06/28/24 0700)  SpO2: 95 % (06/28/24 0723) Vital Signs (24h Range):  Temp:  [97.7 °F (36.5 °C)-98.7 °F (37.1 °C)] 98.2 °F (36.8 °C)  Pulse:  [57-94] 80  Resp:  [18-52] 24  SpO2:  [90 %-100 %] 95 %  BP: (126-183)/(64-95) 150/69     Weight: 133.4 kg (294 lb 1.5 oz)  Body mass index is 46.06 kg/m².    Intake/Output  Summary (Last 24 hours) at 6/28/2024 1016  Last data filed at 6/28/2024 0600  Gross per 24 hour   Intake 569.28 ml   Output 4600 ml   Net -4030.72 ml         Physical Exam  Vitals and nursing note reviewed.   HENT:      Head: Normocephalic and atraumatic.      Nose: Nose normal.   Eyes:      Conjunctiva/sclera: Conjunctivae normal.   Neck:      Vascular: No JVD.   Cardiovascular:      Rate and Rhythm: Normal rate.      Heart sounds: Normal heart sounds.   Pulmonary:      Effort: Pulmonary effort is normal.      Comments: Decreased bilateral   Abdominal:      General: Bowel sounds are normal.      Palpations: Abdomen is soft.   Musculoskeletal:         General: Normal range of motion.   Skin:     General: Skin is warm.   Neurological:      Mental Status: She is alert and oriented to person, place, and time.   Psychiatric:         Mood and Affect: Mood normal.             Significant Labs: All pertinent labs within the past 24 hours have been reviewed.  BMP:   Recent Labs   Lab 06/28/24  0541   GLU 91      K 3.5      CO2 25   BUN 30*   CREATININE 3.2*   CALCIUM 8.6*   MG 1.9     CBC:   Recent Labs   Lab 06/27/24  0045 06/27/24  0310 06/27/24  0415 06/27/24  0539 06/28/24  0541   WBC 9.59 10.33  --   --  14.22*   HGB 11.3* 10.7*  --   --  10.6*   HCT 36.3* 34.2* 40 39 35.2*   * 122*  --   --  137*     CMP:   Recent Labs   Lab 06/27/24  0045 06/28/24  0541   * 137   K 4.1 3.5    104   CO2 22* 25   * 91   BUN 41* 30*   CREATININE 3.5* 3.2*   CALCIUM 8.6* 8.6*   PROT 6.0 5.7*   ALBUMIN 3.3* 2.9*   BILITOT 1.0 1.0   ALKPHOS 90 86   AST 25 17   ALT 5* 6*   ANIONGAP 10 8       Significant Imaging: I have reviewed all pertinent imaging results/findings within the past 24 hours.    Assessment/Plan:      * Chronic kidney disease (CKD), stage V  Patient had PD cath placed 2 weeks ago however due to insurance situation never received dialysis  Tunneled dialysis catheter was placed  "6/22  Revision 6/23  Patient has persistent oozing from dialysis catheter yesterday and DDAVP given and no stopped     Cardiac arrest  Rhythm PEA. Now bradycardiac   Possible cardiac cause . Cardiology on the case   Cannot give blood thinners. Oozing from tunnel catheter  and thrombocytopenia  Severe HF . Need life vest at DC  Cardiology follow up       CHF (congestive heart failure)  EF 25% with elevated BNP  Will need lifevest and AICD as OP   S/p tunnel line and nephrology will do HD daily for now   Amiodarone was on hold for bradycardia  Heparin drip was on hold for thrombocytopenia    Morbid obesity  Body mass index is 46.06 kg/m². Morbid obesity complicates all aspects of disease management from diagnostic modalities to treatment.     Acute hypoxic respiratory failure  Patient has severe fluid overload secondary to ESRD, cardiac arrest in HD hemodialysis  Likely multifactorial from CHF with worsening CKD  EF 40-45%  in previous ECHO but repeat with 25% with elevated BNP  Status post tunnel line 6/22  Intubated with cardiac arrest 6/24 and extubated 6/27  HD as per nephrology         Renal cell carcinoma of right kidney  Aware       Paroxysmal atrial fibrillation  Chronic stable issue  Continue lopressor Holding home apixaban/hepain drip  for now  No more oozing from HD tunnel catheter      Type 2 diabetes mellitus with hyperglycemia  Patient's FSGs are controlled on current medication regimen.  Last A1c reviewed-   Lab Results   Component Value Date    HGBA1C 6.8 (H) 06/19/2024     Most recent fingerstick glucose reviewed- No results for input(s): "POCTGLUCOSE" in the last 24 hours.  Current correctional scale  Medium  Maintain anti-hyperglycemic dose as follows-   Antihyperglycemics (From admission, onward)      None          Hold Oral hypoglycemics while patient is in the hospital.    Essential hypertension  Chronic, controlled. Latest blood pressure and vitals reviewed-     Temp:  [97.7 °F (36.5 °C)-98.7 °F " (37.1 °C)]   Pulse:  [57-94]   Resp:  [18-52]   BP: (126-183)/(64-95)   SpO2:  [90 %-100 %] .   Home meds for hypertension were reviewed and noted below.   Hypertension Medications               carvediloL (COREG) 25 MG tablet Take 1 tablet (25 mg total) by mouth 2 (two) times daily.    cloNIDine (CATAPRES) 0.1 MG tablet Take 1 tablet (0.1 mg total) by mouth 3 (three) times daily as needed (PRN SBP > 165 mmHg).    cloNIDine 0.1 mg/24 hr td ptwk (CATAPRES) 0.1 mg/24 hr Place 1 patch onto the skin every 7 days.    furosemide (LASIX) 40 MG tablet Take 40 mg by mouth once daily.    amLODIPine (NORVASC) 10 MG tablet Take 10 mg by mouth once daily.    nitroGLYCERIN (NITROSTAT) 0.4 MG SL tablet Place 1 tablet (0.4 mg total) under the tongue every 5 (five) minutes as needed for Chest pain.            While in the hospital, will manage blood pressure as follows; Continue home antihypertensive regimen    Will utilize p.r.n. blood pressure medication only if patient's blood pressure greater than 140/90 and she develops symptoms such as worsening chest pain or shortness of breath.    Coronary artery disease  Stable  Continue with medical management  + troponin . Unsure arrest secondary to cardiac cause . H/o a fib noted  Heparin drip was on hold for thrombocytopenia  Amiodarone also on hold and lopressor       VTE Risk Mitigation (From admission, onward)           Ordered     IP VTE HIGH RISK PATIENT  Once         06/18/24 1519     Place sequential compression device  Until discontinued         06/18/24 1519                    Discharge Planning   HARINI: 7/2/2024     Code Status: Full Code   Is the patient medically ready for discharge?:     Reason for patient still in hospital (select all that apply): Treatment  Discharge Plan A: Rehab            Critical care time spent on the evaluation and treatment of severe organ dysfunction, review of pertinent labs and imaging studies, discussions with consulting providers and discussions  with patient/family: 33 minutes.      Manuel Christensen MD  Department of Hospital Medicine   [facility]

## 2024-06-28 NOTE — ASSESSMENT & PLAN NOTE
Stable  Continue with medical management  + troponin . Unsure arrest secondary to cardiac cause . H/o a fib noted  Heparin drip was on hold for thrombocytopenia  Amiodarone also on hold and lopressor

## 2024-06-28 NOTE — NURSING
Nurses Note -- 4 Eyes      6/27/2024   11:39 PM      Skin assessed during: Q Shift Change      [] No Altered Skin Integrity Present    []Prevention Measures Documented      [x] Yes- Altered Skin Integrity Present or Discovered   [x] LDA Added if Not in Epic (Describe Wound)   [x] New Altered Skin Integrity was Present on Admit and Documented in LDA   [x] Wound Image Taken    Wound Care Consulted? Yes    Attending Nurse:  Maribel Strickland RN/Staff Member:  BILLIE

## 2024-06-28 NOTE — NURSING
Pt lying in bed in no acute distress. Repositioned pt and bathed. No complaints. Call light within reach and bed alarm on. Will continue current plan of care.

## 2024-06-28 NOTE — NURSING
Pt lying in bed in no acute distress. Repositioned pt. Family present in room. See ongoing assessment and charted vital signs in epic. See telemetry strip in chart. NG tube intact and functional. Call light within reach and bed alarm on. Will continue current treatment plan.

## 2024-06-28 NOTE — PT/OT/SLP EVAL
Speech Language Pathology Evaluation  Bedside Swallow    Patient Name:  Juhi Taylor   MRN:  [id number]  Admitting Diagnosis: Chronic kidney disease (CKD), stage V    Recommendations:                 General Recommendations:   Dysphagia management  Diet recommendations:  Soft & Bite Sized Diet - IDDSI Level 6, Thin liquids - IDDSI Level 0   Aspiration Precautions:  wear O2 with meals, slow pace, only feed when alert and Assistance with meals   General Precautions: Standard, aspiration, droplet, vision impaired  Communication strategies:  none    Assessment:     Juhi Taylor is a 74 y.o. female with an admitting diagnosis of Chronic kidney disease (CKD), stage V. Clinical swallowing evaluation completed. All po trials consumed with functional oral phase and no overt s/s airway compromise. REC Soft & Bite-Sized (IDDSI 6) textures with thin liquids. Will follow to monitor tolerance and advance as tolerated.     History:   PER HPI: 73 year old pt getting admitted with fluid overload in need of dialysis  Pt had PD catheter insertion 2 weeks ago  She was supposed to start on PD for ESRD but got delayed due to insurance issues  She has oliguria/increased weight gain in past 2 weeks  Also SOB on exertion and cough  Symptoms got worse and she came to ER and got admitted  Pt was started on lasix gtt iv     Past Medical History:   Diagnosis Date    Anticoagulant long-term use     Arthritis     Breast cancer 2014    invasive lobular carcinoma    Cancer of kidney 11/2020    RIGHT KIDNEY CANCER    CHF (congestive heart failure)     Coronary artery disease dx 2005    Depression     Diabetes mellitus     Diastolic heart failure secondary to hypertension     Gout     Hyperlipemia     Hypertension     Hypertrophy of nasal turbinates     Kidney mass 2020    Right    Levoscoliosis     Lung nodule     left    Multiple thyroid nodules     NICOLE (obstructive sleep apnea)     uses C-PAP    Pulmonary hypertension        Past  "Surgical History:   Procedure Laterality Date    AORTOGRAPHY N/A 12/04/2020    Procedure: Aortogram;  Surgeon: Paul Pedersen MD;  Location: [facility] CATH;  Service: Cardiology;  Laterality: N/A;    BREAST SURGERY      CARDIAC CATHETERIZATION  12/2020    CHOLECYSTECTOMY      COLONOSCOPY      multi -last 2014     CORONARY ARTERY BYPASS GRAFT      ESOPHAGOGASTRODUODENOSCOPY      2012     HAND SURGERY Right     INSERTION OF CATHETER N/A 6/23/2024    Procedure: Insertion,catheter;  Surgeon: Meli Griffin MD;  Location: [facility] OR;  Service: Vascular;  Laterality: N/A;  ORIGINAL CATHETER WAS REPOSITIONED.  NO NEW CATHETER WAS PLACED    INSERTION, CATHETER, DIALYSIS, PERITONEAL N/A 6/4/2024    Procedure: IN Insertion Catheter, Dialysis, Peritoneal - laparoscopic;  Surgeon: Rodriguez Catalan Jr., MD;  Location: [facility] OR;  Service: General;  Laterality: N/A;    LAPAROSCOPIC ROBOT-ASSISTED SURGICAL REMOVAL OF KIDNEY USING DA CHELLE XI Right 03/10/2022    Procedure: XI ROBOTIC NEPHRECTOMY- radical;  Surgeon: Rolando Ramirez MD;  Location: [facility] OR;  Service: Urology;  Laterality: Right;    MASTECTOMY W/ SENTINEL NODE BIOPSY Bilateral 01/21/2015    bilateral "dog ears"    NASAL SINUS SURGERY  2015    Dr Bryant FESS/cauterization turbinate     PARTIAL HYSTERECTOMY  1989    PERCUTANEOUS TRANSLUMINAL BALLOON ANGIOPLASTY OF CORONARY ARTERY  12/04/2020    Procedure: Angioplasty-coronary;  Surgeon: Paul Pedersen MD;  Location: [facility] CATH;  Service: Cardiology;;    RENAL BIOPSY Right     9/20/2021 EJ    TUBAL LIGATION      ULTRASOUND GUIDANCE  12/04/2020    Procedure: Ultrasound Guidance;  Surgeon: Paul Pedersen MD;  Location: [facility] CATH;  Service: Cardiology;;       Social History: Patient lives with daughter.    Prior Intubation HX:  6/24-6/27/28    Modified Barium Swallow: NA    Imaging:  Imaging Results              X-Ray Chest AP (Final result)  Result time 06/18/24 13:20:49      Final result by Damian Perez DO (06/18/24 " 13:20:49)                   Impression:      1. Unchanged enlarged cardiomediastinal silhouette.  2. Hazy opacification of the right lung base could reflect atelectasis, infiltrate, pleural effusion or combination there of.      Electronically signed by: Damian Perez  Date:    06/18/2024  Time:    13:20               Narrative:    EXAMINATION:  XR CHEST AP PORTABLE    CLINICAL HISTORY:  Shortness of breath;    FINDINGS:  Portable chest with comparison chest x-ray 05/31/2024.  Unchanged enlarged cardiomediastinal silhouette.  There is prominence of the central vascular structures.  Hazy ill-defined opacification of the right lung base noted.Lungs are clear. Pulmonary vasculature is normal. No acute osseous abnormality.                                       Prior diet: Regular/thin.    Subjective     Requesting ice    Pain/Comfort:  Pain Rating 1: 0/10    Respiratory Status: Nasal cannula, flow 2 L/min    Objective:   Pt seen in ICU for clinical swallow evaluation. Awake and oriented x3. Generalized weakness. Sister at bedside. Pt/sister deny h/o dysphagia. Assisted patient in placing dentures prior to  PO.    Oral Musculature Evaluation  Oral Musculature: general weakness, WFL  Dentition: upper and lower dentures  Secretion Management: adequate  Mucosal Quality: adequate  Mandibular Strength and Mobility: WFL  Oral Labial Strength and Mobility: WFL  Lingual Strength and Mobility: WFL  Velar Elevation: WFL  Buccal Strength and Mobility: impaired  Volitional Cough: weak  Volitional Swallow: able to palpate laryngeal rise  Voice Prior to PO Intake: mildly hoarse; low volume    Bedside Swallow Eval:   Consistencies Assessed:  Thin liquids ---via tsp, cup and straw  Puree --applesauce  Mixed consistencies --diced peaches  Solids --refused      Oral Phase:   Anterior loss noted with water via tsp. Not observed on other PO trials  WFL    Pharyngeal Phase:   no overt clinical signs/symptoms of aspiration    Compensatory  Strategies  None    Treatment: Pt/family educated re: results/recs of evaluation, aspiration precautions, s/s airway compromise, SLP role and POC. Receptive to information provided.     Goals:   Multidisciplinary Problems       SLP Goals          Problem: SLP    Goal Priority Disciplines Outcome   SLP Goal     SLP Progressing   Description: 1) Pt will consume Regular (IDDSI 7) textures and thin liquids (IDDSI 0) with functional oral phase and no overt s/s penetration/aspiration.                         Plan:     Patient to be seen:  3 x/week   Plan of Care expires:  07/05/24  Plan of Care reviewed with:  patient, sibling   SLP Follow-Up:  Yes       Discharge recommendations:  Moderate Intensity Therapy   Barriers to Discharge:  Safety Awareness .    Time Tracking:     SLP Treatment Date:   06/28/24  Speech Start Time:  1050  Speech Stop Time:  1108     Speech Total Time (min):  18 min    Billable Minutes: Treatment Swallowing Dysfunction 8, Eval Swallow and Oral Function 10, and Total Time 18    06/28/2024

## 2024-06-28 NOTE — SUBJECTIVE & OBJECTIVE
Interval History:     Patient is sitting with a cardiac chair.  Lungs exam is difficult but appeared to be no active wheezing  No more signs of bleeding at the dialysis catheter.  Platelet improved  Currently on nasal cannula  Review of Systems  Objective:     Vital Signs (Most Recent):  Temp: 98.2 °F (36.8 °C) (06/28/24 0400)  Pulse: 80 (06/28/24 0723)  Resp: (!) 24 (06/28/24 0723)  BP: (!) 150/69 (06/28/24 0700)  SpO2: 95 % (06/28/24 0723) Vital Signs (24h Range):  Temp:  [97.7 °F (36.5 °C)-98.7 °F (37.1 °C)] 98.2 °F (36.8 °C)  Pulse:  [57-94] 80  Resp:  [18-52] 24  SpO2:  [90 %-100 %] 95 %  BP: (126-183)/(64-95) 150/69     Weight: 133.4 kg (294 lb 1.5 oz)  Body mass index is 46.06 kg/m².    Intake/Output Summary (Last 24 hours) at 6/28/2024 1016  Last data filed at 6/28/2024 0600  Gross per 24 hour   Intake 569.28 ml   Output 4600 ml   Net -4030.72 ml         Physical Exam  Vitals and nursing note reviewed.   HENT:      Head: Normocephalic and atraumatic.      Nose: Nose normal.   Eyes:      Conjunctiva/sclera: Conjunctivae normal.   Neck:      Vascular: No JVD.   Cardiovascular:      Rate and Rhythm: Normal rate.      Heart sounds: Normal heart sounds.   Pulmonary:      Effort: Pulmonary effort is normal.      Comments: Decreased bilateral   Abdominal:      General: Bowel sounds are normal.      Palpations: Abdomen is soft.   Musculoskeletal:         General: Normal range of motion.   Skin:     General: Skin is warm.   Neurological:      Mental Status: She is alert and oriented to person, place, and time.   Psychiatric:         Mood and Affect: Mood normal.             Significant Labs: All pertinent labs within the past 24 hours have been reviewed.  BMP:   Recent Labs   Lab 06/28/24  0541   GLU 91      K 3.5      CO2 25   BUN 30*   CREATININE 3.2*   CALCIUM 8.6*   MG 1.9     CBC:   Recent Labs   Lab 06/27/24  0045 06/27/24  0310 06/27/24  0415 06/27/24  0539 06/28/24  0541   WBC 9.59 10.33  --   --   14.22*   HGB 11.3* 10.7*  --   --  10.6*   HCT 36.3* 34.2* 40 39 35.2*   * 122*  --   --  137*     CMP:   Recent Labs   Lab 06/27/24  0045 06/28/24  0541   * 137   K 4.1 3.5    104   CO2 22* 25   * 91   BUN 41* 30*   CREATININE 3.5* 3.2*   CALCIUM 8.6* 8.6*   PROT 6.0 5.7*   ALBUMIN 3.3* 2.9*   BILITOT 1.0 1.0   ALKPHOS 90 86   AST 25 17   ALT 5* 6*   ANIONGAP 10 8       Significant Imaging: I have reviewed all pertinent imaging results/findings within the past 24 hours.

## 2024-06-28 NOTE — ASSESSMENT & PLAN NOTE
Rhythm PEA. Now bradycardiac   Possible cardiac cause . Cardiology on the case   Cannot give blood thinners. Oozing from tunnel catheter  and thrombocytopenia  Severe HF . Need life vest at DC  Cardiology follow up

## 2024-06-28 NOTE — PROGRESS NOTES
Pulmonary/Critical Care   Progress Note      PATIENT NAME: Juhi Taylor  MRN: [id number]  TODAY'S DATE: 2024  5:28 PM  ADMIT DATE: 2024  AGE: 74 y.o. : 1950    CONSULT REQUESTED BY: Manuel Christensen MD    REASON FOR CONSULT:   Cardiac arrest     HPI:  Ms Taylor is a 73-year-old woman who presents with cardiac arrest.    Code blue was called on  evening, she was unresponsive.  Chest compressions were already underway.  Rhythm was consistent with PE a arrest initially, after giving epinephrine, the rhythm appeared to change to ventricular tachycardia.  We cardioverted and patient achieved ROSC.  Additionallly she received bicarbonate and glucose.     Today:   Did well with SBT- tolerating dialysis.     2024 0 Stable overnight, has been tolerating dialysis.  Her mentation is cloudy.  No respiratory complaints and she remains stable extubated. CXR with CM, better edema, ECHO noted with EF 25%    Review of Systems   Unable to perform ROS: Other (mental acuity)           ALLERGIES  Review of patient's allergies indicates:   Allergen Reactions    Percocet [oxycodone-acetaminophen] Itching    Amiodarone analogues      Itching      Irbesartan Swelling    Januvia [sitagliptin]     Jardiance [empagliflozin]      Leg cramps    Lipitor [atorvastatin] Other (See Comments)     Severe leg pain    Linaclotide Other (See Comments) and Nausea And Vomiting     Does not remember    Lubiprostone Other (See Comments) and Palpitations     Does not remember       INPATIENT SCHEDULED MEDICATIONS   epoetin alaina-epbx  50 Units/kg Intravenous Every Mon, Wed, Fri    famotidine  20 mg Oral Daily    metoprolol tartrate  12.5 mg Oral TID      dexmedeTOMIDine (Precedex) infusion (titrating)  0-1.4 mcg/kg/hr Intravenous Continuous   Stopped at 24 2212       MEDICAL AND SURGICAL HISTORY  Past Medical History:   Diagnosis Date    Anticoagulant long-term use     Arthritis     Breast cancer 2014    invasive  "lobular carcinoma    Cancer of kidney 11/2020    RIGHT KIDNEY CANCER    CHF (congestive heart failure)     Coronary artery disease dx 2005    Depression     Diabetes mellitus     Diastolic heart failure secondary to hypertension     Gout     Hyperlipemia     Hypertension     Hypertrophy of nasal turbinates     Kidney mass 2020    Right    Levoscoliosis     Lung nodule     left    Multiple thyroid nodules     NICOLE (obstructive sleep apnea)     uses C-PAP    Pulmonary hypertension      Past Surgical History:   Procedure Laterality Date    AORTOGRAPHY N/A 12/04/2020    Procedure: Aortogram;  Surgeon: Paul Pedersen MD;  Location: [facility] CATH;  Service: Cardiology;  Laterality: N/A;    BREAST SURGERY      CARDIAC CATHETERIZATION  12/2020    CHOLECYSTECTOMY      COLONOSCOPY      multi -last 2014     CORONARY ARTERY BYPASS GRAFT      ESOPHAGOGASTRODUODENOSCOPY      2012     HAND SURGERY Right     INSERTION OF CATHETER N/A 6/23/2024    Procedure: Insertion,catheter;  Surgeon: Meli Griffin MD;  Location: [facility] OR;  Service: Vascular;  Laterality: N/A;  ORIGINAL CATHETER WAS REPOSITIONED.  NO NEW CATHETER WAS PLACED    INSERTION, CATHETER, DIALYSIS, PERITONEAL N/A 6/4/2024    Procedure: IN Insertion Catheter, Dialysis, Peritoneal - laparoscopic;  Surgeon: Rodriguez Catalan Jr., MD;  Location: [facility] OR;  Service: General;  Laterality: N/A;    LAPAROSCOPIC ROBOT-ASSISTED SURGICAL REMOVAL OF KIDNEY USING DA CHELLE XI Right 03/10/2022    Procedure: XI ROBOTIC NEPHRECTOMY- radical;  Surgeon: Rolando Ramirez MD;  Location: [facility] OR;  Service: Urology;  Laterality: Right;    MASTECTOMY W/ SENTINEL NODE BIOPSY Bilateral 01/21/2015    bilateral "dog ears"    NASAL SINUS SURGERY  2015    Dr Bryant FESS/cauterization turbinate     PARTIAL HYSTERECTOMY  1989    PERCUTANEOUS TRANSLUMINAL BALLOON ANGIOPLASTY OF CORONARY ARTERY  12/04/2020    Procedure: Angioplasty-coronary;  Surgeon: Paul Pedersen MD;  Location: [facility] CATH;  " Service: Cardiology;;    RENAL BIOPSY Right     2021 EJ    TUBAL LIGATION      ULTRASOUND GUIDANCE  2020    Procedure: Ultrasound Guidance;  Surgeon: Paul Pedersen MD;  Location: [facility];  Service: Cardiology;;       ALCOHOL, TOBACCO AND DRUG USE  Social History     Tobacco Use   Smoking Status Former    Current packs/day: 0.00    Types: Cigarettes    Start date: 2016    Quit date: 2016    Years since quittin.5   Smokeless Tobacco Never   Tobacco Comments    quit      Social History     Substance and Sexual Activity   Alcohol Use No     Social History     Substance and Sexual Activity   Drug Use No       FAMILY HISTORY  Family History   Problem Relation Name Age of Onset    Breast cancer Mother      Stroke Father Waqar Sr.     Hypertension Father Waqar Sr.     Hepatitis Brother      Asthma Daughter Nell Taylor     Birth defects Daughter Nell Camejo's two children has cleft lips    Depression Daughter Nell Taylor     Drug abuse Daughter Nell Taylor     Learning disabilities Daughter Nell Taylor     Mental illness Daughter Nell Taylor     Breast cancer Maternal Aunt      Glaucoma Sister      Drug abuse Daughter Nell     Macular degeneration Neg Hx      Retinal detachment Neg Hx         VITAL SIGNS (MOST RECENT)  Temp: 98.4 °F (36.9 °C) (24 1114)  Pulse: 73 (24 1600)  Resp: (!) 27 (24 1600)  BP: (!) 147/68 (24 1600)  SpO2: (!) 93 % (24 1600)    INTAKE AND OUTPUT (LAST 24 HOURS):  Intake/Output Summary (Last 24 hours) at 2024 1609  Last data filed at 2024 0600  Gross per 24 hour   Intake 560 ml   Output 4500 ml   Net -3940 ml       WEIGHT  Wt Readings from Last 1 Encounters:   24 133.4 kg (294 lb 1.5 oz)       Physical Exam  Vitals reviewed.   Constitutional:       General: She is not in acute distress.     Appearance: She is well-developed. She is obese. She is ill-appearing (chronic). She is not  "toxic-appearing or diaphoretic.   HENT:      Head: Normocephalic and atraumatic.      Mouth/Throat:      Pharynx: No oropharyngeal exudate or posterior oropharyngeal erythema.   Eyes:      General: No scleral icterus.     Pupils: Pupils are equal, round, and reactive to light.   Neck:      Vascular: No JVD.   Cardiovascular:      Rate and Rhythm: Normal rate and regular rhythm.      Heart sounds: Normal heart sounds. No murmur heard.  Pulmonary:      Effort: Pulmonary effort is normal. No respiratory distress.      Breath sounds: No wheezing or rales.      Comments: Decreased at bases  Abdominal:      General: Bowel sounds are normal. There is distension.      Palpations: Abdomen is soft.      Tenderness: There is no abdominal tenderness.   Musculoskeletal:         General: No swelling.      Cervical back: Normal range of motion and neck supple. No rigidity.      Right lower leg: Edema present.      Left lower leg: Edema present.   Skin:     General: Skin is warm.      Capillary Refill: Capillary refill takes less than 2 seconds.      Coloration: Skin is not pale.      Findings: No rash.   Neurological:      General: No focal deficit present.      Mental Status: She is alert and oriented to person, place, and time.      Cranial Nerves: No cranial nerve deficit.      Motor: No weakness or abnormal muscle tone.   Psychiatric:      Comments: Calm            ACUTE PHASE REACTANT (LAST 24 HOURS)  No results for input(s): "FERRITIN", "CRP", "LDH", "DDIMER" in the last 24 hours.    CBC LAST (LAST 24 HOURS)  Recent Labs   Lab 06/28/24  0541   WBC 14.22*   RBC 4.16   HGB 10.6*   HCT 35.2*   MCV 85   MCH 25.5*   MCHC 30.1*   RDW 19.3*   *   MPV 9.9   GRAN 77.4*  11.0*   LYMPH 10.6*  1.5   MONO 9.4  1.3*   BASO 0.03   NRBC 0       CHEMISTRY LAST (LAST 24 HOURS)  Recent Labs   Lab 06/28/24  0541      K 3.5      CO2 25   ANIONGAP 8   BUN 30*   CREATININE 3.2*   GLU 91   CALCIUM 8.6*   MG 1.9   ALBUMIN 2.9* " "  PROT 5.7*   ALKPHOS 86   ALT 6*   AST 17   BILITOT 1.0       COAGULATION LAST (LAST 24 HOURS)  No results for input(s): "LABPT", "INR", "APTT" in the last 24 hours.      CARDIAC PROFILE (LAST 24 HOURS)  Recent Labs   Lab 06/27/24  0045   *   TROPONINIHS 52.4*       LAST 7 DAYS MICROBIOLOGY   Microbiology Results (last 7 days)       Procedure Component Value Units Date/Time    Blood culture [[id number]] Collected: 06/24/24 2016    Order Status: Completed Specimen: Blood from Peripheral, Hand, Right Updated: 06/27/24 2232     Blood Culture, Routine No Growth to date      No Growth to date      No Growth to date      No Growth to date    Culture, Respiratory with Gram Stain [[id number]] Collected: 06/24/24 1848    Order Status: Completed Specimen: Respiratory from Sputum, Expectorated Updated: 06/26/24 0918     Respiratory Culture No normal respiratory lesley     Gram Stain (Respiratory) <10 epithelial cells per low power field.     Gram Stain (Respiratory) Few WBC's     Gram Stain (Respiratory) No organisms seen            MOST RECENT IMAGING  X-Ray Chest AP Portable  Narrative: EXAMINATION:  XR CHEST AP PORTABLE    CLINICAL HISTORY:  Ventilator;    FINDINGS:  Portable chest at 04:32 is compared to prior study dated 06/27/2024 shows cardiomegaly.  The left subclavian vein dual port catheter is in stable position.    There is improved aeration of the lungs with improvement of the vascular congestion.  There are tiny pleural effusions.  There is no pneumothorax.  There are no acute osseous abnormalities.  Impression: Moderate cardiomegaly with improvement of the pulmonary congestion    Tiny pleural effusions    Electronically signed by: Katie Cordero  Date:    06/28/2024  Time:    07:02      CURRENT VISIT EKG  Results for orders placed or performed during the hospital encounter of 06/18/24   EKG 12-lead    Narrative    Ordered by an unspecified provider.       ECHOCARDIOGRAM RESULTS  Results for orders placed " during the hospital encounter of 06/18/24    Echo    Interpretation Summary    Left Ventricle: The left ventricle is moderately dilated. Moderately increased wall thickness. There is moderate concentric hypertrophy. Moderate global hypokinesis present. There is mildly reduced systolic function with a visually estimated ejection fraction of 40 - 45%. Unable to assess diastolic function due to atrial fibrillation.    Right Ventricle: Normal right ventricular cavity size. Wall thickness is normal. Systolic function is borderline low.    Left Atrium: Left atrium is mildly dilated.    Right Atrium: Right atrium is mildly dilated.    Mitral Valve: There is mild regurgitation with a centrally directed jet.    Tricuspid Valve: There is mild regurgitation with a centrally directed jet.    Pulmonary Artery: There is moderate to severe pulmonary hypertension. The estimated pulmonary artery systolic pressure is 56 mmHg.    IVC/SVC: Elevated venous pressure at 15 mmHg.        VENTILATOR INFORMATION              LAST ARTERIAL BLOOD GAS  ABG  Recent Labs   Lab 06/27/24  0539   PH 7.434   PO2 78*   PCO2 34.3*   HCO3 23.0*   BE -1       ASSESSMENT:   Cardiac arrest  Hyperkalemia   Hx of vascular disease   UTI  NICOLE and probable OHS   Chronic hypercapneic respiratory failure   7.   Ventricular tachycardia   8.  Thrombocytopenia     Still not clear on etiology of arrest but may be a NSTEMI precipitated by dialysis.  ECHO has evidence of reduced EF.     Neuro exam is promising today. She opens eyes and is moving extremities. Tolerating dialysis and is off of vasopressors.     Plan for extubation   PLAN:   - Continue broad spectrum antibiotics  - stable extubated but tenuous  - Appreciate nephrology and cardiology consultants assistance   - Defer management of NSTEMI and EF to cardiology  - try to increase activity  - Remove antony   - high risk for decompensation      Kendall Brady MD  [facility] Pulmonary/Critical Care  06/28/2024

## 2024-06-28 NOTE — PLAN OF CARE
Spoke with [facility] to check status of dialysis chair, was advised a new Hep B antigen was needed and the request was closed because patient stayed in hospital longer than originally anticipated when request was sent. MD notified and new order received. [facility] will pull result from epic.     Spoke with Swetha in central intake for Kevin to notify that request for dialysis needs to be reopened. Swetha states patient needs a high intensity form filled out and faxed back. Provided fax number [phone] to Swetha so she will be able to fax paper over.

## 2024-06-28 NOTE — ASSESSMENT & PLAN NOTE
Patient has severe fluid overload secondary to ESRD, cardiac arrest in HD hemodialysis  Likely multifactorial from CHF with worsening CKD  EF 40-45%  in previous ECHO but repeat with 25% with elevated BNP  Status post tunnel line 6/22  Intubated with cardiac arrest 6/24 and extubated 6/27  HD as per nephrology

## 2024-06-28 NOTE — PROGRESS NOTES
Nephrology Progress Note        Patient Name: Juhi Taylor  MRN: [id number]    Patient Class: IP- Inpatient   Admission Date: 6/18/2024  Length of Stay: 9 days  Date of Service: 6/28/2024    Attending Physician: Manuel Christensen MD  Primary Care Provider: Tony Sadler MD    Reason for Consult: josue    SUBJECTIVE:     HPI: 73F fallowed with Dr. Martinez from nephrology for advanced CKD stage 4-5 getting admitted with fluid overload. Pt had PD catheter insertion 2 weeks ago in preparation for initiation of dialysis, however had post-operative issues with increased leakage. Then her PD in initiation and training was delayed due to insurance issues. She has oliguria/increased weight gain in past 2 weeks, SOB on exertion and cough. Symptoms got worse and she came to ER and got admitted.     I saw her in the ER and recommend trial of lasix gtt, cassia. Will consult Dr. Catalan to evaluate patient and make recommendation for catheter care - we probably will not be able to use it for now for dialysis.    6/19 VSS. Only 175cc UO documented, but may be inaccurately charted - pt boarding in ER. Will increase lasix gtt to 10mg/hr and ensure at least q shift UO documentation.    6/20 VSS. BP low, stop Clonidine patch. Increase lasix gtt to 15mg/hr, add metolazone. Stop Fluconazole in 1-2 days after stopping IV abx for suspected PNA. Appreciate Surgery input - will d/w patient initiation of HD tomorrow if unable to produce much UO and symptomatic improvement...    6/21 VSS. Will discuss initiation of dialysis via HD, she is not responding to diuretics.    6/22 VSS. Plan for HD today after dialysis access placement. Seen on HD, BF around 275 cc/min max, otherwise tolerating well. Will have to be repositioned in cath lab by Dr. Griffin on Monday.    6/23 VSS. Getting HD cath repositioned today, plan HD tomorrow.    6/24 VSS, on 2-3L NC, first HD 6/22, second HD today    6/25 105 min into HD treatment, patient  became lethargic acutely (was conversing/calm)- gave NS bolus- went into PEA arrest- had ACLS with ROSC- net UF 750cc, intubated and transferred to ICU, bradycardic, SBP 110s, FIO2 35%, UOP 100cc, required amio and levophed but now off- mld rise in trop noted, lactic acid normal, CXR clear, echo pending    6/26 tolerated HD yest- got off 2L- echo with EF 20-25% with grade III DD which is much worse compared to < 1 week ago with EF 40-45% suggesting cardiac event- trop peaked- per cards plan for life vest and may need LHC- HR 50s, -160s, FIO2 30%, UOP 550cc  - seen on HD  - bleeding at TDC exit site is better    6/27 tolerated HD well yest- got off 3L- -160, FIO2 30%, UOP 400cc      6/28 tolerated HD with restraints yest- got off 4L, SBP > 150, on 2L NC, ordered 4L UF- niece who is a dialysis nurse asked that we only take off 3L, UOP 245cc  - seen on HD  - conversant, calm- still very edematous    ASSESSMENT/PLAN:     PEA/VT arrest during dialysis 6/24  - echo shows newly depressed EF supportive of a cardiac event- could have been precipitated by myocardial stress during dialysis- don't feel that trop rise is c/w an acute NSTEMI however- will work on optimization of volume status in prep for LHC and life vest- on beta blocker- appreciate cardiology recs    CKD stage 5- uremia, hyperkalemia, oliguria  PD cathether in place  Initiated on RRT via IHD this admission using TDC  - plan for daily HD treatments for clearance/UF  - renal diet, 1.5L fluid restriction  - will need outpatient HD unit placement- plan for discharge to [facility] in interim  - dose meds for CrCl < 10/HD    Anemia of CKD  Thrombocytopenia  - continue PONCE with HD  - hold heparin with HD- once plat > 150 will resume b/c blood flow sluggish through TDC    MBD / Secondary HPT  - phos is at goal- continue renal based nutrition  - last PTH acceptable    Baseline HTN  Volume overload/S CHF exacerbation  Hyponatremia  - still very  "edematous  - push UF with HD    Elevated WBC count  - management per     HypoK/HypoMg  - adjust dialysate- may supplement    Updated family     Thank you for allowing us to participate in the care of your patient!   We will follow the patient and provide recommendations as needed.         Laboratory:  Recent Labs   Lab 06/26/24 0302 06/27/24  0045 06/28/24  0541    134* 137   K 4.8 4.1 3.5    102 104   CO2 21* 22* 25   BUN 63* 41* 30*   CREATININE 4.2* 3.5* 3.2*   GLU 79 138* 91       Recent Labs   Lab 06/24/24  0543 06/24/24 2016 06/25/24 0321 06/26/24 0302 06/27/24 0045 06/27/24  0310 06/28/24  0541   CALCIUM 9.0 8.6*   < > 8.7 8.6*  --  8.6*   ALBUMIN  --  2.8*  --   --  3.3*  --  2.9*   PHOS 6.0*  --   --   --  3.7  --   --    MG 2.6  --    < > 2.3 2.0 1.9 1.9    < > = values in this interval not displayed.       Recent Labs   Lab 10/10/23  1113 01/22/24  1133 05/08/24  1139   PTH, Intact 583.1 H 506.7 H 291.7 H       No results for input(s): "POCTGLUCOSE" in the last 168 hours.    Recent Labs   Lab 07/05/23  1533 01/22/24  1133 06/19/24  0443   Hemoglobin A1C 5.6 5.4 6.8 H       Recent Labs   Lab 06/27/24  0045 06/27/24  0310 06/27/24  0415 06/27/24  0539 06/28/24  0541   WBC 9.59 10.33  --   --  14.22*   HGB 11.3* 10.7*  --   --  10.6*   HCT 36.3* 34.2* 40 39 35.2*   * 122*  --   --  137*   MCV 83 81*  --   --  85   MCHC 31.1* 31.3*  --   --  30.1*   MONO 8.2  0.8 8.2  0.9  --   --  9.4  1.3*   EOSINOPHIL 2.3 1.8  --   --  1.7       Recent Labs   Lab 06/24/24 2016 06/27/24  0045 06/28/24  0541   BILITOT 0.7 1.0 1.0   BILIDIR  --  0.4*  --    PROT 5.3* 6.0 5.7*   ALBUMIN 2.8* 3.3* 2.9*   ALKPHOS 84 90 86   ALT 11 5* 6*   AST 19 25 17       Recent Labs   Lab 03/17/22  2240 04/07/22  1536 04/17/23  1448 07/14/23  2026 03/14/24  2254 05/09/24  1029 06/23/24  1816   Color, UA Colorless A   < > Colorless A  --  Yellow Yellow  --    Appearance, UA Clear   < > Clear  --  Clear Clear  --  "   pH, UA 5.0   < > 6.0  --  6.0 6.0  --    Specific [place], UA 1.010   < > 1.010  --  1.010 1.015  --    Protein, UA Negative   < > 1+ A  --  1+ A 1+ A  --    Glucose, UA Negative   < > Negative  --  Negative Negative  --    Ketones, UA Negative   < > Negative  --  Negative Negative  --    Urobilinogen, UA Negative  --   --   --  Negative  --   --    Bilirubin (UA) Negative   < > Negative  --  Negative Negative  --    Occult Blood UA Negative   < > Negative  --  TRACE Negative  --    Nitrite, UA Negative   < > Negative  --  Negative Negative  --    RBC, UA  --    < > 3   < > 3 1 >100 H   WBC, UA  --    < > 3   < > 5 1 >100 H   Bacteria  --    < > None   < > None Rare Many A   Hyaline Casts, UA  --    < > 0  --  0 0  --     < > = values in this interval not displayed.       Recent Labs   Lab 06/26/24  1322 06/27/24  0415 06/27/24  0539   POC PH 7.440 7.437 7.434   POC PCO2 39.0 35.8 34.3 L   POC HCO3 26.5 24.1 23.0 L   POC PO2 95 72 L 78 L   POC SATURATED O2 98 95 96   POC BE 2 0 -1   Sample ARTERIAL ARTERIAL ARTERIAL       Microbiology Results (last 7 days)       Procedure Component Value Units Date/Time    Blood culture [[id number]] Collected: 06/24/24 2016    Order Status: Completed Specimen: Blood from Peripheral, Hand, Right Updated: 06/27/24 2232     Blood Culture, Routine No Growth to date      No Growth to date      No Growth to date      No Growth to date    Culture, Respiratory with Gram Stain [[id number]] Collected: 06/24/24 1848    Order Status: Completed Specimen: Respiratory from Sputum, Expectorated Updated: 06/26/24 0918     Respiratory Culture No normal respiratory lesley     Gram Stain (Respiratory) <10 epithelial cells per low power field.     Gram Stain (Respiratory) Few WBC's     Gram Stain (Respiratory) No organisms seen            Review of patient's allergies indicates:   Allergen Reactions    Percocet [oxycodone-acetaminophen] Itching    Amiodarone analogues      Itching      Irbesartan  Swelling    Januvia [sitagliptin]     Jardiance [empagliflozin]      Leg cramps    Lipitor [atorvastatin] Other (See Comments)     Severe leg pain    Linaclotide Other (See Comments) and Nausea And Vomiting     Does not remember    Lubiprostone Other (See Comments) and Palpitations     Does not remember       Outpatient meds:  No current facility-administered medications on file prior to encounter.     Current Outpatient Medications on File Prior to Encounter   Medication Sig Dispense Refill    allopurinoL (ZYLOPRIM) 300 MG tablet Take 1 tablet (300 mg total) by mouth once daily. 90 tablet 3    amLODIPine (NORVASC) 10 MG tablet Take 10 mg by mouth once daily.      calcitRIOL (ROCALTROL) 0.5 MCG Cap Take 0.5 mcg by mouth once daily.      carvediloL (COREG) 25 MG tablet Take 1 tablet (25 mg total) by mouth 2 (two) times daily. 180 tablet 2    cyanocobalamin (VITAMIN B-12) 100 MCG tablet Take 100 mcg by mouth once daily.      evolocumab (REPATHA SURECLICK) 140 mg/mL PnIj Inject 1 mL (140 mg total) into the skin every 14 (fourteen) days. 2 mL 11    furosemide (LASIX) 40 MG tablet Take 40 mg by mouth once daily.      loratadine (CLARITIN) 10 mg tablet Take 10 mg by mouth once daily.      magnesium oxide (MAG-OX) 400 mg (241.3 mg magnesium) tablet Take 1 tablet (400 mg total) by mouth daily as needed (cramping). 30 tablet 0    blood sugar diagnostic (TRUE METRIX GLUCOSE TEST STRIP) Strp Use 1x daily. Insurance preferred. 100 strip 3    blood sugar diagnostic Strp To check BG 1 time daily, to use with insurance preferred meter 100 strip 3    cloNIDine (CATAPRES) 0.1 MG tablet Take 1 tablet (0.1 mg total) by mouth 3 (three) times daily as needed (PRN SBP > 165 mmHg). 90 tablet 6    cloNIDine 0.1 mg/24 hr td ptwk (CATAPRES) 0.1 mg/24 hr Place 1 patch onto the skin every 7 days. 4 patch 4    lancets Misc To check BG 1 times daily, to use with insurance preferred meter 100 each 3    nitroGLYCERIN (NITROSTAT) 0.4 MG SL tablet  Place 1 tablet (0.4 mg total) under the tongue every 5 (five) minutes as needed for Chest pain. 25 tablet 0    [DISCONTINUED] aspirin (ECOTRIN) 81 MG EC tablet Take 1 tablet (81 mg total) by mouth once daily. 90 tablet 3    [DISCONTINUED] DULoxetine (CYMBALTA) 20 MG capsule TAKE ONE CAPSULE BY MOUTH ONCE DAILY 30 capsule 4    [DISCONTINUED] folic acid (FOLVITE) 1 MG tablet Take 1 mg by mouth once daily.      [DISCONTINUED] metFORMIN (GLUCOPHAGE-XR) 500 MG ER 24hr tablet Take 2 tablets (1,000 mg total) by mouth 2 (two) times daily with meals. 360 tablet 3    [DISCONTINUED] sodium bicarbonate 650 MG tablet Take 1 tablet (650 mg total) by mouth once daily. 30 tablet 11       Scheduled meds:   epoetin alaina-epbx  50 Units/kg Intravenous Every Mon, Wed, Fri    famotidine  20 mg Oral Daily    metoprolol tartrate  12.5 mg Oral TID       Infusions:   dexmedeTOMIDine (Precedex) infusion (titrating)  0-1.4 mcg/kg/hr Intravenous Continuous   Stopped at 06/26/24 2212         PRN meds:    Current Facility-Administered Medications:     acetaminophen, 650 mg, Oral, Q8H PRN    aluminum-magnesium hydroxide-simethicone, 30 mL, Oral, QID PRN    dextrose 50%, 12.5 g, Intravenous, PRN    dextrose 50%, 25 g, Intravenous, PRN    glucagon (human recombinant), 1 mg, Intramuscular, PRN    glucose, 16 g, Oral, PRN    glucose, 24 g, Oral, PRN    melatonin, 6 mg, Oral, Nightly PRN    ondansetron, 4 mg, Intravenous, Q6H PRN      OBJECTIVE:     Vital Signs and IO:  Temp:  [97.8 °F (36.6 °C)-98.7 °F (37.1 °C)]   Pulse:  [61-94]   Resp:  [18-46]   BP: (126-183)/(64-95)   SpO2:  [90 %-100 %]   I/O last 3 completed shifts:  In: 1934.5 [P.O.:40; I.V.:1074.5; Other:500; NG/GT:120; IV Piggyback:200]  Out: 4950 [Urine:450; Other:4500]  Wt Readings from Last 5 Encounters:   06/28/24 133.4 kg (294 lb 1.5 oz)   06/25/24 (!) 147.9 kg (326 lb)   06/19/24 132.5 kg (292 lb 1.8 oz)   06/04/24 134.3 kg (296 lb)   05/27/24 134.3 kg (296 lb)     Body mass index is  46.06 kg/m².    Physical Exam  Constitutional:       General: Patient is not in acute distress.     Appearance: Patient is well-developed. She is not diaphoretic.   HENT:      Head: Normocephalic and atraumatic.      Mouth/Throat: Mucous membranes are moist.   Eyes:      General: No scleral icterus.     Pupils: Pupils are equal, round, and reactive to light.   Cardiovascular:      Rate and Rhythm: Normal rate and regular rhythm.   Pulmonary:      Effort: Pulmonary effort is normal. No respiratory distress.      Breath sounds: No stridor.   Abdominal:      General: There is no distension.      Palpations: Abdomen is soft.   Musculoskeletal:         General: No deformity. Normal range of motion.      Cervical back: Neck supple.   Skin:     General: Skin is warm and dry.      Findings: No rash present. No erythema.   Neurological:      Mental Status: Patient is alert and oriented to person, place, and time.      Cranial Nerves: No cranial nerve deficit.   Psychiatric:         Behavior: Behavior normal.          Patient care time was spent personally by me on the following activities: > 35 min    Obtaining a history.  Examination of patient.  Providing medical care at the patients bedside.  Developing a treatment plan with patient or surrogate and bedside caregivers.  Ordering and reviewing laboratory studies, radiographic studies, pulse oximetry.  Ordering and performing treatments and interventions.  Evaluation of patient's response to treatment.  Discussions with consultants while on the unit and immediately available to the patient.  Re-evaluation of the patient's condition.  Documentation in the medical record.     Hector Blakely MD    [facility]  [address] [place] [address]  JULIANNA Shah [address]    [phone] - tel  [phone] - fax    6/28/2024

## 2024-06-28 NOTE — RESPIRATORY THERAPY
06/28/24 0723   Patient Assessment/Suction   Level of Consciousness (AVPU) alert   Respiratory Effort Unlabored   PRE-TX-O2   Device (Oxygen Therapy) nasal cannula   $ Is the patient on Low Flow Oxygen? Yes   Flow (L/min) (Oxygen Therapy) 2   SpO2 95 %   Pulse Oximetry Type Continuous   $ Pulse Oximetry - Multiple Charge Pulse Oximetry - Multiple   Pulse 80   Resp (!) 24   Preset CPAP/BiPAP Settings   Mode Of Delivery Standby;BiPAP   $ CPAP/BiPAP Daily Charge BiPAP/CPAP Daily   Equipment Type V60

## 2024-06-28 NOTE — ASSESSMENT & PLAN NOTE
Body mass index is 46.06 kg/m². Morbid obesity complicates all aspects of disease management from diagnostic modalities to treatment.

## 2024-06-28 NOTE — NURSING
Pt coughed out her left nare NG tube. Pt is refusing to have NG tube replaced. Placed a nursing consult to speech therapy for a swallow study. Performed a nursing swallow study. Pt did not cough and is able to sit up straight. Pt did not hold small amount of liquid in her mouth and followed commands when told to swallow. Call light within reach and bed alarm on. Will continue current plan of care.

## 2024-06-28 NOTE — PROGRESS NOTES
06/28/24 1631   Required for all Hemodialysis Patients   Hepatitis Status negative   Handoff Report   Received From [facility]   Given To [facility]EE[facility] Sánchez   Treatment Type   Treatment Type Acute   Vital Signs   Temp 98.4 °F (36.9 °C)   BP (!) 147/80   Assessments (Pre/Post)   Consent Obtained yes   Date Hepatitis Profile Obtained 06/25/24   Blood Liters Processed (BLP) 49.4   Transport Modality not applicable   Level of Consciousness (AVPU) alert   Dialyzer Clearance mildly streaked   Pain   Pain Rating (0-10): Rest 0        Hemodialysis Catheter 06/22/24 1130 left subclavian   Placement Date/Time: 06/22/24 1130   Location: left subclavian  Placement Verification: X-ray   Line Necessity Review CRRT/HD   Site Assessment   (old blood clot noted around site. old bruising noted to l shoulder.)   Line Securement Device Secured with sutures   Dressing Type CHG impregnated dressing/sponge   Dressing Status Old drainage   Dressing Intervention Sterile dressing change   Date on Dressing 06/28/24   Dressing Due to be Changed 06/29/24   Venous Patency/Care deaccessed;flushed w/o difficulty;normal saline locked   Arterial Patency/Care deaccessed;flushed w/o difficulty;normal saline locked   Post-Hemodialysis Assessment   Rinseback Volume (mL) 250 mL   Blood Volume Processed (Liters) 49.4 L   Dialyzer Clearance Lightly streaked   Duration of Treatment 180 minutes   Additional Fluid Intake (mL) 500 mL   Total UF (mL) 3500 mL   Net Fluid Removal 3000   Patient Response to Treatment Beckie tx   Post-Treatment Weight 130.1 kg (286 lb 13.1 oz)   Treatment Weight Change -3.3   Post-Hemodialysis Comments pt beckie tx with net uf of 3l noted.     Pt beckie tx with net uf of 3L noted. Pt educated on infection control.  Report given to primary nurse.

## 2024-06-28 NOTE — ASSESSMENT & PLAN NOTE
Chronic, controlled. Latest blood pressure and vitals reviewed-     Temp:  [97.7 °F (36.5 °C)-98.7 °F (37.1 °C)]   Pulse:  [57-94]   Resp:  [18-52]   BP: (126-183)/(64-95)   SpO2:  [90 %-100 %] .   Home meds for hypertension were reviewed and noted below.   Hypertension Medications               carvediloL (COREG) 25 MG tablet Take 1 tablet (25 mg total) by mouth 2 (two) times daily.    cloNIDine (CATAPRES) 0.1 MG tablet Take 1 tablet (0.1 mg total) by mouth 3 (three) times daily as needed (PRN SBP > 165 mmHg).    cloNIDine 0.1 mg/24 hr td ptwk (CATAPRES) 0.1 mg/24 hr Place 1 patch onto the skin every 7 days.    furosemide (LASIX) 40 MG tablet Take 40 mg by mouth once daily.    amLODIPine (NORVASC) 10 MG tablet Take 10 mg by mouth once daily.    nitroGLYCERIN (NITROSTAT) 0.4 MG SL tablet Place 1 tablet (0.4 mg total) under the tongue every 5 (five) minutes as needed for Chest pain.            While in the hospital, will manage blood pressure as follows; Continue home antihypertensive regimen    Will utilize p.r.n. blood pressure medication only if patient's blood pressure greater than 140/90 and she develops symptoms such as worsening chest pain or shortness of breath.

## 2024-06-28 NOTE — PLAN OF CARE
Patient found lying in bed awake. She had refused bipap overnight. She is saturating well on a little nasal cannula.    Her voice is stronger today. She is able to more strongly clear her throat / cough - I have encouraged her to do so / encourage pulmonary toilet. The NG tube had accidentally come out this morning. I have left it out. Bedside swallow was ok. Patient seen by SLP and passed. Didn't want to have any lunch but had nearly 100% of dinner, tolerated ok. Given pills whole.    Got her up in the chair with max assist and dayanara steady. She could not support her weight standing. She was able to support balance weight while siting edge of bed. She was up in the chair for nearly 2 hours did ok. Back to bed. Was difficult again to get her up out of the chair took 3 people and dayanara steady.    One strong coughing fit that I encouraged caused her to gag a bit and she threw up x1 small green emesis and then felt ok. Had some white-tan sputum.    Voiding small amount unmeasured (missed purewick) - bladder scan didn't demonstrate anything.    PD catheter secured with clean/dry gauze as before, changed by previous shift.    WOC rn at bedside to assess, pictures taken, dressed. The infiltrate site to right arm shown to md jhaveri - woc rn assesses send pic to dr ortiz , dressed by her. Keep ivs to left arm in place avoid right arm iv for now per md.    SCDs  in place / on. Elevate legs. Mepilexes maintained bilat heels and sacrum. Float heels.    Cbg fingerstick not actionable.    Still with some edema to bilat legs and arms but improving still from day to day.    Sinus rhythm today with bundle branch block as before. Still with some multifocal pvcs on occasion and with 11 beat vtach at end of shift I called on-call dr morris and discussed all this with him and also messaged cardio np and hospitalist. Hospitalist considering amiodarone but discussion between themselves (cardiology), elected hold.     She had a couple  loose bms but were all very small usually just a kamini on the pad. She wanted to get on the bedpan, she felt like she had to push something impacted. I discussed with md. Try fleet enema. Myself and oncoming nurse attempt - that nurse could not feel any stool in vault. Enema given at end of shift and will await result - defer (thank you).    She tolerated dialysis 3 liters off today. Given epogen. Restart heparin, given.    The bed is low and alarm on. Clinical alarms reviewed and armed. Monitor strip printed and reviewed.    Nurses Note -- 4 Eyes      6/28/2024   1700      Skin assessed during: Daily Assessment      [] No Altered Skin Integrity Present    []Prevention Measures Documented      [x] Yes- Altered Skin Integrity Present or Discovered   [] LDA Added if Not in Epic (Describe Wound)   [] New Altered Skin Integrity was Present on Admit and Documented in LDA   [] Wound Image Taken    Wound Care Consulted? Yes    Attending Nurse:  Eric Strickland RN/Staff Member:  yes Olesya guzman rn

## 2024-06-28 NOTE — NURSING
Pt lying in bed in no acute distress. Repositioned pt. No complaints. Call light within reach and bed alarm on. Will continue current plan of care.

## 2024-06-28 NOTE — CARE UPDATE
06/27/24 1938   Patient Assessment/Suction   Level of Consciousness (AVPU) alert   Respiratory Effort Normal   Expansion/Accessory Muscles/Retractions no use of accessory muscles   Skin Integrity   $ Wound Care Tech Time 15 min   Area Observed Bridge of nose   Skin Appearance without discoloration   PRE-TX-O2   Device (Oxygen Therapy) nasal cannula   Flow (L/min) (Oxygen Therapy) 3.5   SpO2 95 %   Pulse Oximetry Type Continuous   $ Pulse Oximetry - Multiple Charge Pulse Oximetry - Multiple   Pulse 78   Resp (!) 43   Preset CPAP/BiPAP Settings   Mode Of Delivery BiPAP;Standby   Education   $ Education BiPAP;15 min   Tobacco Cessation Intervention   Do you use any type of tobacco product? Yes   $ Smoking Cessation Charges Smoking Cessation - Intermediate (Non-CTTS)   Respiratory Evaluation   $ Care Plan Tech Time 15 min   $ Respiratory Evaluation Complete   Evaluation For Transfer   Admitting Diagnosis CKD   Home Oxygen   Has Home Oxygen? Yes   Liter Flow 2   Duration continuous   Device home concentrator   Home Aerosol, MDI, DPI, and Other Treatments/Therapies   Home Respiratory Therapy Per Patient/Review of Chart No   Oxygen Care Plan   Oxygen Care Plan Per Protocol   SPO2 Goal (%) 92% non-cardiac   Rationale SpO2 is <92% (Non-cardiac Pt.)   Bronchodilator Care Plan   Bronchodilator Care Plan N/A   Rationale No Rationale found   Atelectasis Care Plan   Atelectasis Care Plan Other   Rationale No Rational Found   Airway Clearance Care Plan   Airway Clearance Care Plan Other   Rationale No rationale found

## 2024-06-28 NOTE — CONSULTS
Purple bruising left chest shoulder hemsplit, healing abrasion/sheering right buttock,  cleaned and applied Triad, bilateral lower extremeties small red raised lesions, few erupted nurse painted with betadine.  Right arm redness inner are with 2 small fluid filled blisters. Iv I nfiltrate  xeroform and wrapped lightly with kerlix.

## 2024-06-28 NOTE — PROGRESS NOTES
"Critical access hospital  Department of Cardiology  Progress Note      PATIENT NAME: Juhi Taylor  MRN: [id number]  TODAY'S DATE: 06/28/2024  ADMIT DATE: 6/18/2024                          CONSULT REQUESTED BY: Manuel Christensen MD    SUBJECTIVE     PRINCIPAL PROBLEM: Chronic kidney disease (CKD), stage V      REASON FOR CONSULT:  cardiac arrest, bradycardia, elevated troponin    INTERVAL HISTORY:  06/28/2024:  Patient was wakened denies complaints.  She was fatigued and undergoing dialysis.  Sinus rhythm, heart rate 70-80.  Platelets have improved to 137      6/27/24  Patient evaluated with multiple family at the bedside  She is groggy but awake; extubated today   Platelet level improved today, 122  Normal sinus rhythm with occasional PVCs on telemetry  Troponin 52 this a.m.    06/26/24  BP stable overnight off pressor   Remains intubated, she is waking up off sedation  Platelets dropped to 87;      HPI:  Patient was 74-year-old female who has been hospitalized since 06/18/2024.  She was status post cardiac arrest, PEA during dialysis yesterday evening.  She is intubated and unable to provide any history.  Strips reviewed from time event showed brief run of V-tach    "Code blue was called on June 24th evening, she was unresponsive. Chest compressions were already underway. Rhythm was consistent with PE a arrest initially, after giving epinephrine, the rhythm appeared to change to ventricular tachycardia. We cardioverted and patient achieved ROSC.  Additionallly she received bicarbonate and glucose. "      Review of patient's allergies indicates:   Allergen Reactions    Percocet [oxycodone-acetaminophen] Itching    Amiodarone analogues      Itching      Irbesartan Swelling    Januvia [sitagliptin]     Jardiance [empagliflozin]      Leg cramps    Lipitor [atorvastatin] Other (See Comments)     Severe leg pain    Linaclotide Other (See Comments) and Nausea And Vomiting     Does not remember    Lubiprostone Other " "(See Comments) and Palpitations     Does not remember       Past Medical History:   Diagnosis Date    Anticoagulant long-term use     Arthritis     Breast cancer 2014    invasive lobular carcinoma    Cancer of kidney 11/2020    RIGHT KIDNEY CANCER    CHF (congestive heart failure)     Coronary artery disease dx 2005    Depression     Diabetes mellitus     Diastolic heart failure secondary to hypertension     Gout     Hyperlipemia     Hypertension     Hypertrophy of nasal turbinates     Kidney mass 2020    Right    Levoscoliosis     Lung nodule     left    Multiple thyroid nodules     NICOLE (obstructive sleep apnea)     uses C-PAP    Pulmonary hypertension      Past Surgical History:   Procedure Laterality Date    AORTOGRAPHY N/A 12/04/2020    Procedure: Aortogram;  Surgeon: Paul Pedersen MD;  Location: [facility] CATH;  Service: Cardiology;  Laterality: N/A;    BREAST SURGERY      CARDIAC CATHETERIZATION  12/2020    CHOLECYSTECTOMY      COLONOSCOPY      multi -last 2014     CORONARY ARTERY BYPASS GRAFT      ESOPHAGOGASTRODUODENOSCOPY      2012     HAND SURGERY Right     INSERTION OF CATHETER N/A 6/23/2024    Procedure: Insertion,catheter;  Surgeon: Meli Griffin MD;  Location: [facility] OR;  Service: Vascular;  Laterality: N/A;  ORIGINAL CATHETER WAS REPOSITIONED.  NO NEW CATHETER WAS PLACED    INSERTION, CATHETER, DIALYSIS, PERITONEAL N/A 6/4/2024    Procedure: IN Insertion Catheter, Dialysis, Peritoneal - laparoscopic;  Surgeon: Rodriguez Catalan Jr., MD;  Location: [facility] OR;  Service: General;  Laterality: N/A;    LAPAROSCOPIC ROBOT-ASSISTED SURGICAL REMOVAL OF KIDNEY USING DA CHELLE XI Right 03/10/2022    Procedure: XI ROBOTIC NEPHRECTOMY- radical;  Surgeon: Rolando Ramirez MD;  Location: [facility] OR;  Service: Urology;  Laterality: Right;    MASTECTOMY W/ SENTINEL NODE BIOPSY Bilateral 01/21/2015    bilateral "dog ears"    NASAL SINUS SURGERY  2015    Dr Bryant FESS/cauterization turbinate     PARTIAL HYSTERECTOMY  "     PERCUTANEOUS TRANSLUMINAL BALLOON ANGIOPLASTY OF CORONARY ARTERY  2020    Procedure: Angioplasty-coronary;  Surgeon: Paul Pedersen MD;  Location: [facility] CATH;  Service: Cardiology;;    RENAL BIOPSY Right     2021 EJ    TUBAL LIGATION      ULTRASOUND GUIDANCE  2020    Procedure: Ultrasound Guidance;  Surgeon: Paul Pedersen MD;  [facility];  Service: Cardiology;;     Social History     Tobacco Use    Smoking status: Former     Current packs/day: 0.00     Types: Cigarettes     Start date: 2016     Quit date: 2016     Years since quittin.5    Smokeless tobacco: Never    Tobacco comments:     quit    Substance Use Topics    Alcohol use: No    Drug use: No        REVIEW OF SYSTEMS  Negative except as mentioned in HPI    OBJECTIVE     VITAL SIGNS (Most Recent)  Temp: 98.4 °F (36.9 °C) (24 1114)  Pulse: 72 (24 1530)  Resp: (!) 40 (24 1530)  BP: 129/62 (24 1530)  SpO2: (!) 90 % (24 1530)    VENTILATION STATUS  Resp: (!) 40 (24 1530)  SpO2: (!) 90 % (24 1530)           I & O (Last 24H):  Intake/Output Summary (Last 24 hours) at 2024 1533  Last data filed at 2024 0600  Gross per 24 hour   Intake 569.28 ml   Output 4500 ml   Net -3930.72 ml       WEIGHTS  Wt Readings from Last 3 Encounters:   24 0400 133.4 kg (294 lb 1.5 oz)   24 0443 (!) 147.9 kg (326 lb 1 oz)   24 1927 (!) 147.3 kg (324 lb 11.8 oz)   24 1202 132.5 kg (292 lb)   24 1009 (!) 147.9 kg (326 lb)   24 0910 132.5 kg (292 lb 1.8 oz)       PHYSICAL EXAM    GENERAL:  Morbidly obese female resting comfortably in bed   HEENT: Normocephalic.  NG tube in place  NECK: No JVD.   CARDIAC: Regular rate and rhythm.  CHEST ANATOMY:  Left chest wall dialysis catheter in place  LUNGS:  Diminished breath sounds, breathing comfortably on low-flow O2 via nasal cannula; occasional cough  ABDOMEN: Soft, obese hypoactive bowel sounds.     EXTREMITIES:  pitting edema lower extremities and feet  CENTRAL NERVOUS SYSTEM:  Awake oriented Damon cath in place draining small amount of yellow urine  Tele: Sinus rhythm PVCs, heart rate in the 70s      HOME MEDICATIONS:  No current facility-administered medications on file prior to encounter.     Current Outpatient Medications on File Prior to Encounter   Medication Sig Dispense Refill    allopurinoL (ZYLOPRIM) 300 MG tablet Take 1 tablet (300 mg total) by mouth once daily. 90 tablet 3    amLODIPine (NORVASC) 10 MG tablet Take 10 mg by mouth once daily.      calcitRIOL (ROCALTROL) 0.5 MCG Cap Take 0.5 mcg by mouth once daily.      carvediloL (COREG) 25 MG tablet Take 1 tablet (25 mg total) by mouth 2 (two) times daily. 180 tablet 2    cyanocobalamin (VITAMIN B-12) 100 MCG tablet Take 100 mcg by mouth once daily.      evolocumab (REPATHA SURECLICK) 140 mg/mL PnIj Inject 1 mL (140 mg total) into the skin every 14 (fourteen) days. 2 mL 11    furosemide (LASIX) 40 MG tablet Take 40 mg by mouth once daily.      loratadine (CLARITIN) 10 mg tablet Take 10 mg by mouth once daily.      magnesium oxide (MAG-OX) 400 mg (241.3 mg magnesium) tablet Take 1 tablet (400 mg total) by mouth daily as needed (cramping). 30 tablet 0    blood sugar diagnostic (TRUE METRIX GLUCOSE TEST STRIP) Strp Use 1x daily. Insurance preferred. 100 strip 3    blood sugar diagnostic Strp To check BG 1 time daily, to use with insurance preferred meter 100 strip 3    cloNIDine (CATAPRES) 0.1 MG tablet Take 1 tablet (0.1 mg total) by mouth 3 (three) times daily as needed (PRN SBP > 165 mmHg). 90 tablet 6    cloNIDine 0.1 mg/24 hr td ptwk (CATAPRES) 0.1 mg/24 hr Place 1 patch onto the skin every 7 days. 4 patch 4    lancets Misc To check BG 1 times daily, to use with insurance preferred meter 100 each 3    nitroGLYCERIN (NITROSTAT) 0.4 MG SL tablet Place 1 tablet (0.4 mg total) under the tongue every 5 (five) minutes as needed for Chest pain. 25  tablet 0    [DISCONTINUED] aspirin (ECOTRIN) 81 MG EC tablet Take 1 tablet (81 mg total) by mouth once daily. 90 tablet 3    [DISCONTINUED] DULoxetine (CYMBALTA) 20 MG capsule TAKE ONE CAPSULE BY MOUTH ONCE DAILY 30 capsule 4    [DISCONTINUED] folic acid (FOLVITE) 1 MG tablet Take 1 mg by mouth once daily.      [DISCONTINUED] metFORMIN (GLUCOPHAGE-XR) 500 MG ER 24hr tablet Take 2 tablets (1,000 mg total) by mouth 2 (two) times daily with meals. 360 tablet 3    [DISCONTINUED] sodium bicarbonate 650 MG tablet Take 1 tablet (650 mg total) by mouth once daily. 30 tablet 11       SCHEDULED MEDS:   epoetin alaina-epbx  50 Units/kg Intravenous Every Mon, Wed, Fri    famotidine  20 mg Oral Daily    metoprolol tartrate  12.5 mg Oral TID       CONTINUOUS INFUSIONS:   dexmedeTOMIDine (Precedex) infusion (titrating)  0-1.4 mcg/kg/hr Intravenous Continuous   Stopped at 06/26/24 2212       PRN MEDS:  Current Facility-Administered Medications:     acetaminophen, 650 mg, Oral, Q8H PRN    aluminum-magnesium hydroxide-simethicone, 30 mL, Oral, QID PRN    dextrose 50%, 12.5 g, Intravenous, PRN    dextrose 50%, 25 g, Intravenous, PRN    glucagon (human recombinant), 1 mg, Intramuscular, PRN    glucose, 16 g, Oral, PRN    glucose, 24 g, Oral, PRN    melatonin, 6 mg, Oral, Nightly PRN    ondansetron, 4 mg, Intravenous, Q6H PRN    LABS AND DIAGNOSTICS     CBC LAST 3 DAYS  Recent Labs   Lab 06/27/24  0045 06/27/24  0310 06/27/24  0415 06/27/24  0539 06/28/24  0541   WBC 9.59 10.33  --   --  14.22*   RBC 4.36 4.22  --   --  4.16   HGB 11.3* 10.7*  --   --  10.6*   HCT 36.3* 34.2* 40 39 35.2*   MCV 83 81*  --   --  85   MCH 25.9* 25.4*  --   --  25.5*   MCHC 31.1* 31.3*  --   --  30.1*   RDW 18.9* 19.2*  --   --  19.3*   * 122*  --   --  137*   MPV 10.8 9.6  --   --  9.9   GRAN 77.7*  7.5 75.0*  7.8*  --   --  77.4*  11.0*   LYMPH 11.2*  1.1 13.5*  1.4  --   --  10.6*  1.5   MONO 8.2  0.8 8.2  0.9  --   --  9.4  1.3*   PATY  "0.02 0.02  --   --  0.03   NRBC 0 1*  --   --  0       COAGULATION LAST 3 DAYS  Recent Labs   Lab 06/24/24 2016   INR 1.1       CHEMISTRY LAST 3 DAYS  Recent Labs   Lab 06/24/24 2016 06/25/24  0321 06/26/24  0302 06/26/24 0411 06/26/24  1322 06/27/24  0045 06/27/24  0310 06/27/24  0415 06/27/24  0539 06/28/24  0541      < > 137  --   --  134*  --   --   --  137   K 4.1   < > 4.8  --   --  4.1  --   --   --  3.5      < > 104  --   --  102  --   --   --  104   CO2 22*   < > 21*  --   --  22*  --   --   --  25   ANIONGAP 11   < > 12  --   --  10  --   --   --  8   BUN 85*   < > 63*  --   --  41*  --   --   --  30*   CREATININE 4.8*   < > 4.2*  --   --  3.5*  --   --   --  3.2*   *   < > 79  --   --  138*  --   --   --  91   CALCIUM 8.6*   < > 8.7  --   --  8.6*  --   --   --  8.6*   PH  --    < >  --    < > 7.440  --   --  7.437 7.434  --    MG  --    < > 2.3  --   --  2.0 1.9  --   --  1.9   ALBUMIN 2.8*  --   --   --   --  3.3*  --   --   --  2.9*   PROT 5.3*  --   --   --   --  6.0  --   --   --  5.7*   ALKPHOS 84  --   --   --   --  90  --   --   --  86   ALT 11  --   --   --   --  5*  --   --   --  6*   AST 19  --   --   --   --  25  --   --   --  17   BILITOT 0.7  --   --   --   --  1.0  --   --   --  1.0    < > = values in this interval not displayed.       CARDIAC PROFILE LAST 3 DAYS  Recent Labs   Lab 06/27/24  0045   *       ENDOCRINE LAST 3 DAYS  No results for input(s): "TSH", "PROCAL" in the last 168 hours.      LAST ARTERIAL BLOOD GAS  ABG  Recent Labs   Lab 06/27/24  0539   PH 7.434   PO2 78*   PCO2 34.3*   HCO3 23.0*   BE -1       LAST 7 DAYS MICROBIOLOGY   Microbiology Results (last 7 days)       Procedure Component Value Units Date/Time    Blood culture [[id number]] Collected: 06/24/24 2016    Order Status: Completed Specimen: Blood from Peripheral, Hand, Right Updated: 06/27/24 2232     Blood Culture, Routine No Growth to date      No Growth to date      No Growth to date "      No Growth to date    Culture, Respiratory with Gram Stain [[id number]] Collected: 06/24/24 1848    Order Status: Completed Specimen: Respiratory from Sputum, Expectorated Updated: 06/26/24 0918     Respiratory Culture No normal respiratory lesley     Gram Stain (Respiratory) <10 epithelial cells per low power field.     Gram Stain (Respiratory) Few WBC's     Gram Stain (Respiratory) No organisms seen            MOST RECENT IMAGING  X-Ray Chest AP Portable  Narrative: EXAMINATION:  XR CHEST AP PORTABLE    CLINICAL HISTORY:  Ventilator;    FINDINGS:  Portable chest at 04:32 is compared to prior study dated 06/27/2024 shows cardiomegaly.  The left subclavian vein dual port catheter is in stable position.    There is improved aeration of the lungs with improvement of the vascular congestion.  There are tiny pleural effusions.  There is no pneumothorax.  There are no acute osseous abnormalities.  Impression: Moderate cardiomegaly with improvement of the pulmonary congestion    Tiny pleural effusions    Electronically signed by: Katie Cordero  Date:    06/28/2024  Time:    07:02      ECHOCARDIOGRAM RESULTS (last 5)  Results for orders placed during the hospital encounter of 06/18/24    Echo    Interpretation Summary    Left Ventricle: The left ventricle is moderately dilated. Moderately increased wall thickness. There is moderate concentric hypertrophy. Moderate global hypokinesis present. There is mildly reduced systolic function with a visually estimated ejection fraction of 40 - 45%. Unable to assess diastolic function due to atrial fibrillation.    Right Ventricle: Normal right ventricular cavity size. Wall thickness is normal. Systolic function is borderline low.    Left Atrium: Left atrium is mildly dilated.    Right Atrium: Right atrium is mildly dilated.    Mitral Valve: There is mild regurgitation with a centrally directed jet.    Tricuspid Valve: There is mild regurgitation with a centrally directed jet.     Pulmonary Artery: There is moderate to severe pulmonary hypertension. The estimated pulmonary artery systolic pressure is 56 mmHg.    IVC/SVC: Elevated venous pressure at 15 mmHg.      Results for orders placed during the hospital encounter of 08/20/23    Echo    Interpretation Summary    Left Ventricle: The left ventricle is normal in size. Increased ventricular mass. Moderately increased wall thickness. Mild global hypokinesis present. There is mildly reduced systolic function with a visually estimated ejection fraction of 40 - 50%.  EF ~ 45%% Grade I diastolic dysfunction. Elevated left ventricular filling pressure. Tissue Doppler velocity is reduced.    Right Ventricle: Normal right ventricular cavity size. Systolic function is normal.    Mitral Valve: There is no stenosis. The mean pressure gradient across the mitral valve is 3 mmHg at a heart rate of  bpm.    IVC/SVC: Normal venous pressure at 3 mmHg.    Left ventricle hypertrophy with depressed ejection fraction and elevation of mean left atrial pressure      Results for orders placed during the hospital encounter of 11/19/21    Echo    Interpretation Summary  · The estimated ejection fraction is 35-40%.  · The left ventricle is moderately enlarged with moderate concentric hypertrophy  · Severe left atrial enlargement.  · Normal right ventricular size with normal right ventricular systolic function.  · Mild-to-moderate mitral regurgitation.  · Mild tricuspid regurgitation.  · The estimated PA systolic pressure is 44 mmHg.  · There is pulmonary hypertension.      Results for orders placed during the hospital encounter of 11/30/20    Echo Color Flow Doppler? Yes    Interpretation Summary  · There is left ventricular concentric hypertrophy.  · With low normal systolic function. The estimated ejection fraction is 50%.  · Grade II diastolic dysfunction.  · With normal right ventricular systolic function.  · Severe left atrial enlargement.  · Mild right atrial  enlargement.  · Mild mitral regurgitation.  · Mild to moderate tricuspid regurgitation.  · Normal central venous pressure (3 mmHg).  · The estimated PA systolic pressure is 51 mmHg.  · There is pulmonary hypertension.      Results for orders placed in visit on 07/06/18    Transthoracic echo (TTE) complete    Interpretation Summary  · Left ventricle ejection fraction is mildly decreased at 45%  · Grade I (mild) left ventricular diastolic dysfunction consistent with impaired relaxation.  · Mitral valve shows mild regurgitation.      CURRENT/PREVIOUS VISIT EKG  Results for orders placed or performed during the hospital encounter of 06/18/24   EKG 12-lead    Collection Time: 06/26/24  1:46 AM   Result Value Ref Range    QRS Duration 136 ms    [facility] QTC Calculation 482 ms    Narrative    Test Reason : R00.1,    Vent. Rate : 055 BPM     Atrial Rate : 055 BPM     P-R Int : 154 ms          QRS Dur : 136 ms      QT Int : 504 ms       P-R-T Axes : 034 -11 189 degrees     QTc Int : 482 ms    Sinus bradycardia with occasional Premature ventricular complexes  Right bundle branch block  LVH with repolarization abnormality  Possible Lateral infarct ,age undetermined  Abnormal ECG  When compared with ECG of 25-JUN-2024 00:30,  Right bundle branch block is now Present    Referred By: [email or web address]   SELF           Confirmed By:            ASSESSMENT/PLAN:     Active Hospital Problems    Diagnosis    *Chronic kidney disease (CKD), stage V    Cardiac arrest    CHF (congestive heart failure)    Acute hypoxic respiratory failure    Morbid obesity    Renal cell carcinoma of right kidney    Paroxysmal atrial fibrillation    Type 2 diabetes mellitus with hyperglycemia    Coronary artery disease    Essential hypertension       ASSESSMENT & PLAN:   Cardiac arrest  Ventricular tachycardia  Left bundle branch block  Cardiomyopathy  Acute hypoxic respiratory failure  CAD  H/O PCI x3 in 2020  Hypertension  Congestive heart failure  Paroxysmal atrial  fibrillation  Diabetes mellitus 2  CKD stage 5/ESRD  Morbid Obesity  Renal cell carcinoma of right kidney -right nephrectomy  Thrombocytopenia      RECOMMENDATIONS:  -Patient is status post PEA/V-tach arrest on 06/24/2024  -Pressors have been weaned off and she was extubated today  -Echocardiogram showed drop in EF to 20-25% (reduced EF since 2021)  -Left bundle-branch block noted on EKG.  -Pt will need ICD implanted once recovers from pulmonary standpoint.   - Patient isn't sure if she wants to do angiogram at this point  -Platelets continue to improve daily, Monitor CBC daily  -Recommend start Eliquis 2.5 mg b.i.d. for anticoagulation in presence of PAF.  Patient states she does not want to do this nor start Plavix.      -Rhythm has been sinus rhythm with PVCs, no recurrent VT.    Started Lopressor yesterday, continue Lopressor to 12.5 mg TID   -Family reports patient had bad experience w/ amiodarone previously.  Amiodarone is listed as an allergy on her chart    -Closely monitor electrolytes daily. K+ is 3.5 & Magnesium 1.9 today      Ramila Lucia NP  [facility]  Department of Cardiology  Date of Service: 06/28/2024        I have personally interviewed and examined the patient, I have reviewed and discussed the Nurse Practitioner's history and physical, assessment, and plan. I agree with the findings and plan.    1. Discussed with patient again at length and in detail.  And patient refuses amiodarone.  And at the present time she also does not want cardiac catheterization.  And she might consider sometime later.  2. She is tolerating metoprolol tartrate well continue the same.  3. Need to consider anticoagulation with heparin as she is at risk for DVT  Recommend heparin 5000 units subQ q.8 hours.  And 1st dose now.               Patient has been extubated and she does say that she does understand.   Patient is refusing amiodarone and apparently she had refused amiodarone in the past.     Patient is tolerating metoprolol tartrate 12.5 mg p.o. b.I.d. will increase it to t.I.d. dosing today and her ectopy has decreased significantly.  Discussed with patient's daughters and patient in regards with cardiac catheterization and coronary angiography and patient is refusing for cardiac catheterization and coronary angiography.   Patient's platelets have improved her current platelets a 122 and consideration for starting her on anticoagulation with Eliquis but patient is refusing Eliquis.  And she is also refusing anticoagulation with heparin.   Consideration for Plavix 75 mg p.o. daily.   Discussed with patient's daughters who were present in the room in regards with her current management.          Dr. Dennis Trejo M.D.  [facility]  Department of Cardiology  Date of Service: 06/28/2024  2:29 PM

## 2024-06-28 NOTE — PROGRESS NOTES
[facility]  Adult Nutrition   Progress Note (Follow-Up)    SUMMARY     Recommendations  Recommendation/Intervention: 1. Pt was on CLD. Diet advanced after rounds. Continue IDDSI Level 6, Renal, Diabetic diet and encourage PO intake.   2. RD added ONS, Nepro TID (to provide 1275 kcal/day and 57 g/day protein)  Goals: 1.    Patient to meet at least 50% of estimated energy and protein needs by RD follow up.  Nutrition Goal Status: new  Communication of RD Recs: reviewed with RN    Nutritional Diagnosis (PES Statement)   Inadequate energy intake related to poor appetite as evidence by poor intake of meals since being extubated and patient states she has had a poor appetite over the past 3 months.     Dietitian Rounds Brief  Follow up. Pt was on regular diet after being extubated on 6/24, then downgraded to CLD due to unable to tolerate because of throat pain. Recently advanced to IDDSI Level 6, Renal, Diabetic diet. PO intake of meals is poor. Pt states she has a poor appetite.   LBM: 06/28/24, loose    Reason for Assessment  Reason For Assessment: RD follow-up  Diagnosis: other (see comments) (Chronic kidney disease (CKD), stage V)  Relevant Medical History: Coronary artery disease (Chronic), Essential hypertension, Type 2 diabetes mellitus with hyperglycemia, Paroxysmal atrial fibrillation (Chronic), Renal cell carcinoma of right kidney, Acute hypoxic respiratory failure, Morbid obesity, CHF (congestive heart failure), Cardiac arrest  Interdisciplinary Rounds: did not attend  Nutrition Discharge Planning: renal/ diabetic, FR per MD    Nutrition Risk Screen  Nutrition Risk Screen: no indicators present       Wound 06/27/24 0701 Abrasion(s) Right Buttocks-Wound Image: Images linked  MST Score: 0  Have you recently lost weight without trying?: No  Weight loss score: 0  Have you been eating poorly because of a decreased appetite?: No  Appetite score: 0       Nutrition Related Social Determinants of Health:  "  Food Insecurity: Food Insecurity Present (2024)    Hunger Vital Sign     Worried About Running Out of Food in the Last Year: Sometimes true     Ran Out of Food in the Last Year: Never true       Nutrition/Diet History  Spiritual, Cultural Beliefs, Zoroastrianism Practices, Values that Affect Care: no  Food Allergies: NKFA  Factors Affecting Nutritional Intake: decreased appetite    Anthropometrics  Temp: 98.4 °F (36.9 °C)  Height Method: Stated  Height: 5' 7" (170.2 cm)  Height (inches): 67 in  Weight Method: Bed Scale  Weight: 133.4 kg (294 lb 1.5 oz)  Weight (lb): 294.1 lb  Ideal Body Weight (IBW), Female: 135 lb  % Ideal Body Weight, Female (lb): 240.55 %  BMI (Calculated): 46.1  BMI Grade: greater than 40 - morbid obesity  Usual Body Weight (UBW), k.27 kg (about 3 months ago per pateint)  % Usual Body Weight: 105.04       Weight History:  Wt Readings from Last 10 Encounters:   24 133.4 kg (294 lb 1.5 oz)   24 (!) 147.9 kg (326 lb)   24 132.5 kg (292 lb 1.8 oz)   24 134.3 kg (296 lb)   24 134.3 kg (296 lb)   24 131.7 kg (290 lb 4.8 oz)   24 132 kg (291 lb 0.1 oz)   24 (!) 137.1 kg (302 lb 3.2 oz)   24 131.9 kg (290 lb 12.6 oz)   11/15/23 130.7 kg (288 lb 4 oz)       Lab/Procedures/Meds: Pertinent Labs Reviewed    Clinical Chemistry:  Recent Labs   Lab 24  0543 24  0321 24  0045 24  0310 24  0541    136   < > 134*  --  137   K 5.4* 4.1   < > 4.1  --  3.5    103   < > 102  --  104   CO2 22* 22*   < > 22*  --  25   * 211*   < > 138*  --  91   * 85*   < > 41*  --  30*   CREATININE 5.5* 4.8*   < > 3.5*  --  3.2*   CALCIUM 9.0 8.6*   < > 8.6*  --  8.6*   PROT  --  5.3*  --  6.0  --  5.7*   ALBUMIN  --  2.8*  --  3.3*  --  2.9*   BILITOT  --  0.7  --  1.0  --  1.0   ALKPHOS  --  84  --  90  --  86   AST  --  19  --  25  --  17   ALT  --  11  --  5*  --  6*   ANIONGAP 8 11   < > 10  --  8 "   MG 2.6  --    < > 2.0   < > 1.9   PHOS 6.0*  --   --  3.7  --   --     < > = values in this interval not displayed.       CBC:   Recent Labs   Lab 06/28/24  0541   WBC 14.22*   RBC 4.16   HGB 10.6*   HCT 35.2*   *   MCV 85   MCH 25.5*   MCHC 30.1*       Cardiac Profile:  Recent Labs   Lab 06/27/24  0045   *       Diabetes:  Lab Results   Component Value Date    HGBA1C 6.8 (H) 06/19/2024         Medications: Pertinent Medications reviewed    Scheduled Meds:   epoetin alaina-epbx  50 Units/kg Intravenous Every Mon, Wed, Fri    famotidine  20 mg Oral Daily    metoprolol tartrate  12.5 mg Oral TID       Continuous Infusions:   dexmedeTOMIDine (Precedex) infusion (titrating)  0-1.4 mcg/kg/hr Intravenous Continuous   Stopped at 06/26/24 2212       PRN Meds:.  Current Facility-Administered Medications:     acetaminophen, 650 mg, Oral, Q8H PRN    aluminum-magnesium hydroxide-simethicone, 30 mL, Oral, QID PRN    dextrose 50%, 12.5 g, Intravenous, PRN    dextrose 50%, 25 g, Intravenous, PRN    glucagon (human recombinant), 1 mg, Intramuscular, PRN    glucose, 16 g, Oral, PRN    glucose, 24 g, Oral, PRN    melatonin, 6 mg, Oral, Nightly PRN    ondansetron, 4 mg, Intravenous, Q6H PRN    Estimated/Assessed Needs  Weight Used For Calorie Calculations: (!) 147.9 kg (326 lb 1 oz)  Energy Calorie Requirements (kcal): 6919-3515 kcals/day (15-20 kcals/kg ABW)  Energy Need Method: Kcal/kg  Protein Requirements:  g/day (1.5-2.0 g/kg IBW)  Weight Used For Protein Calculations: 61 kg (134 lb 7.7 oz)     Estimated Fluid Requirement Method: RDA Method  RDA Method (mL): 2219  CHO Requirement: 277-370 g CHO/day (less to control diabetes)    Nutrition Prescription Ordered  Current Diet Order: Clear liquids, advanced after rounds.    Evaluation of Received Nutrient/Fluid Intake  Energy Calories Required: not meeting needs  Protein Required: not meeting needs  Fluid Required: not meeting needs  Tolerance: tolerating      Intake/Output Summary (Last 24 hours) at 6/28/2024 1501  Last data filed at 6/28/2024 0600  Gross per 24 hour   Intake 569.28 ml   Output 4500 ml   Net -3930.72 ml      % Intake of Estimated Energy Needs: 0 - 25 %  % Meal Intake: 0 - 25 %    Nutrition Risk  Level of Risk/Frequency of Follow-up: high     Monitor and Evaluation  Food and Nutrient Intake: energy intake, food and beverage intake  Food and Nutrient Adminstration: diet order  Knowledge/Beliefs/Attitudes: food and nutrition knowledge/skill, beliefs and attitudes  Physical Activity and Function: factors affecting access to physical activity, nutrition-related ADLs and IADLs  Anthropometric Measurements: weight, weight change, body mass index  Biochemical Data, Medical Tests and Procedures: electrolyte and renal panel, gastrointestinal profile, glucose/endocrine profile, inflammatory profile, lipid profile  Nutrition-Focused Physical Findings: overall appearance     Nutrition Follow-Up  RD Follow-up?: Yes    Carina Samson RD 06/28/2024 3:02 PM

## 2024-06-28 NOTE — PLAN OF CARE
[facility] has provided a TTS 2nd chair at 11AM. Provided Start date 7/2 at 10:30AM.   Have advised clinic pt is not ready to discharge at this time.   Will get updated start date once pt is closer to discharge.      06/28/24 1434   Post-Acute Status   Post-Acute Authorization Dialysis

## 2024-06-28 NOTE — PLAN OF CARE
Problem: SLP  Goal: SLP Goal  Description: 1) Pt will consume Regular (IDDSI 7) textures and thin liquids (IDDSI 0) with functional oral phase and no overt s/s penetration/aspiration.    Outcome: Progressing      Clinical swallowing evaluation completed. All po trials consumed with functional oral phase and no overt s/s airway compromise. REC Soft & Bite-Sized (IDDSI 6) textures with thin liquids. Will follow to monitor tolerance and advance as tolerated.

## 2024-06-28 NOTE — PLAN OF CARE
Received call from Pat at [facility] requesting updated clinicals. Emailed clinicals to [email or web address]       06/28/24 3065   Post-Acute Status   Post-Acute Authorization Dialysis   Diaylsis Status Pending medical clearance/testing

## 2024-06-28 NOTE — PT/OT/SLP RE-EVAL
Physical Therapy Re-evaluation    Patient Name:  Juhi Taylor   MRN:  [id number]    Recommendations:     Discharge Recommendations: Moderate Intensity Therapy  Discharge Equipment Recommendations: none   Barriers to discharge:  Fall risk, medical status    Assessment:     Juhi Taylor is a 74 y.o. female admitted with a medical diagnosis of Chronic kidney disease (CKD), stage V.  She presents with the following impairments/functional limitations: weakness, impaired endurance, impaired self care skills, impaired functional mobility, gait instability, impaired balance, decreased lower extremity function, edema, impaired cardiopulmonary response to activity.    Pt was found HOB elevated on 5L O2 and agreeable to therapy. Pt required Kathleen with help assisting legs for bed mobility to reach EOB. Pt tolerated sitting at EOB for 10 minutes with good balance while sister fixed her hair. Pt declined any attempt at standing or ambulating. Pt explained that she has been very fatigued and feeling weak since doing HD daily. Pt required ModA for sitting to supine with assistance lifting the legs up. Pt was left HOB elevated with sister present and all needs met.     Rehab Prognosis:  Fair; patient would benefit from acute skilled PT services to address these deficits and reach maximum level of function.      Recent Surgery: Procedure(s) (LRB):  Insertion,catheter (N/A) 5 Days Post-Op    Plan:     During this hospitalization, patient to be seen 6 x/week to address the above listed problems via gait training, therapeutic activities, therapeutic exercises, neuromuscular re-education  Plan of Care Expires:  07/28/24  Plan of Care Reviewed with: patient, sibling    Subjective     Communicated with RN prior to session.  Patient found HOB elevated with telemetry, peripheral IV, oxygen upon PT entry to room, agreeable to evaluation.      Chief Complaint: Fatigue  Patient comments/goals: Get better  Pain/Comfort:  Pain Rating  1: 0/10    Patients cultural, spiritual, Hoahaoism conflicts given the current situation: no      Objective:     Patient found with: telemetry, peripheral IV, oxygen     General Precautions: Standard, fall  Orthopedic Precautions: N/A  Braces: N/A  Respiratory Status: Nasal cannula, flow 5 L/min    Exams:  RLE ROM: WFL except hip flexion   RLE Strength: DF 5/5 KE 4/5 HF 2+/5  LLE ROM: WFL except hip flexion   LLE Strength: DF 5/5 KE 4/5 HF 2+/5    Functional Mobility:  Bed Mobility:     Supine to Sit: minimum assistance  Sit to Supine: moderate assistance    AM-PAC 6 CLICK MOBILITY  Total Score:13       Treatment and Education:   Pt was educated on the importance of time OOB, POC, safe transfers and ambulation, and use of call button for further assistance.     Patient left HOB elevated with all lines intact, call button in reach, bed alarm on, and sister present.    GOALS:   Multidisciplinary Problems       Physical Therapy Goals          Problem: Physical Therapy    Goal Priority Disciplines Outcome Goal Variances Interventions   Physical Therapy Goal     PT, PT/OT      Description: Goals to be met by: 24     Patient will increase functional independence with mobility by performin. Supine to sit with Supervision  2. Sit to stand transfer with Supervision  3. Bed to chair transfer with Supervision using Rolling Walker  4. Gait  x 150 feet with Supervision using Rolling Walker.                              History:     Past Medical History:   Diagnosis Date    Anticoagulant long-term use     Arthritis     Breast cancer     invasive lobular carcinoma    Cancer of kidney 2020    RIGHT KIDNEY CANCER    CHF (congestive heart failure)     Coronary artery disease dx     Depression     Diabetes mellitus     Diastolic heart failure secondary to hypertension     Gout     Hyperlipemia     Hypertension     Hypertrophy of nasal turbinates     Kidney mass     Right    Levoscoliosis     Lung nodule      "left    Multiple thyroid nodules     NICOLE (obstructive sleep apnea)     uses C-PAP    Pulmonary hypertension        Past Surgical History:   Procedure Laterality Date    AORTOGRAPHY N/A 12/04/2020    Procedure: Aortogram;  Surgeon: Paul Pedersen MD;  Location: [facility] CATH;  Service: Cardiology;  Laterality: N/A;    BREAST SURGERY      CARDIAC CATHETERIZATION  12/2020    CHOLECYSTECTOMY      COLONOSCOPY      multi -last 2014     CORONARY ARTERY BYPASS GRAFT      ESOPHAGOGASTRODUODENOSCOPY      2012     HAND SURGERY Right     INSERTION OF CATHETER N/A 6/23/2024    Procedure: Insertion,catheter;  Surgeon: Meli Griffin MD;  Location: [facility] OR;  Service: Vascular;  Laterality: N/A;  ORIGINAL CATHETER WAS REPOSITIONED.  NO NEW CATHETER WAS PLACED    INSERTION, CATHETER, DIALYSIS, PERITONEAL N/A 6/4/2024    Procedure: IN Insertion Catheter, Dialysis, Peritoneal - laparoscopic;  Surgeon: Rodriguez Catalan Jr., MD;  Location: [facility] OR;  Service: General;  Laterality: N/A;    LAPAROSCOPIC ROBOT-ASSISTED SURGICAL REMOVAL OF KIDNEY USING DA CHELLE XI Right 03/10/2022    Procedure: XI ROBOTIC NEPHRECTOMY- radical;  Surgeon: Rolando Ramirez MD;  Location: [facility] OR;  Service: Urology;  Laterality: Right;    MASTECTOMY W/ SENTINEL NODE BIOPSY Bilateral 01/21/2015    bilateral "dog ears"    NASAL SINUS SURGERY  2015    Dr Bryant FESS/cauterization turbinate     PARTIAL HYSTERECTOMY  1989    PERCUTANEOUS TRANSLUMINAL BALLOON ANGIOPLASTY OF CORONARY ARTERY  12/04/2020    Procedure: Angioplasty-coronary;  Surgeon: Paul Pedersen MD;  Location: [facility] CATH;  Service: Cardiology;;    RENAL BIOPSY Right     9/20/2021 EJ    TUBAL LIGATION      ULTRASOUND GUIDANCE  12/04/2020    Procedure: Ultrasound Guidance;  Surgeon: Paul Pedersen MD;  Location: [facility] CATH;  Service: Cardiology;;       Time Tracking:     PT Received On: 06/28/24  PT Start Time: 1107     PT Stop Time: 1121  PT Total Time (min): 14 min     Billable Minutes: Re-eval " 14      06/28/2024

## 2024-06-29 LAB
ALBUMIN SERPL BCP-MCNC: 3.1 G/DL (ref 3.5–5.2)
ALP SERPL-CCNC: 96 U/L (ref 55–135)
ALT SERPL W/O P-5'-P-CCNC: 7 U/L (ref 10–44)
ANION GAP SERPL CALC-SCNC: 10 MMOL/L (ref 8–16)
AST SERPL-CCNC: 17 U/L (ref 10–40)
BACTERIA BLD CULT: NORMAL
BASOPHILS # BLD AUTO: 0.02 K/UL (ref 0–0.2)
BASOPHILS NFR BLD: 0.1 % (ref 0–1.9)
BILIRUB SERPL-MCNC: 1.1 MG/DL (ref 0.1–1)
BUN SERPL-MCNC: 24 MG/DL (ref 8–23)
CALCIUM SERPL-MCNC: 8.7 MG/DL (ref 8.7–10.5)
CHLORIDE SERPL-SCNC: 104 MMOL/L (ref 95–110)
CO2 SERPL-SCNC: 23 MMOL/L (ref 23–29)
CREAT SERPL-MCNC: 3 MG/DL (ref 0.5–1.4)
DIFFERENTIAL METHOD BLD: ABNORMAL
EOSINOPHIL # BLD AUTO: 0.2 K/UL (ref 0–0.5)
EOSINOPHIL NFR BLD: 0.9 % (ref 0–8)
ERYTHROCYTE [DISTWIDTH] IN BLOOD BY AUTOMATED COUNT: 20.1 % (ref 11.5–14.5)
EST. GFR  (NO RACE VARIABLE): 16 ML/MIN/1.73 M^2
GLUCOSE SERPL-MCNC: 114 MG/DL (ref 70–110)
GLUCOSE SERPL-MCNC: 121 MG/DL (ref 70–110)
GLUCOSE SERPL-MCNC: 129 MG/DL (ref 70–110)
GLUCOSE SERPL-MCNC: 177 MG/DL (ref 70–110)
GLUCOSE SERPL-MCNC: 181 MG/DL (ref 70–110)
HCT VFR BLD AUTO: 38.5 % (ref 37–48.5)
HGB BLD-MCNC: 11.3 G/DL (ref 12–16)
IMM GRANULOCYTES # BLD AUTO: 0.09 K/UL (ref 0–0.04)
IMM GRANULOCYTES NFR BLD AUTO: 0.5 % (ref 0–0.5)
LACTATE SERPL-SCNC: 0.9 MMOL/L (ref 0.5–1.9)
LYMPHOCYTES # BLD AUTO: 1.3 K/UL (ref 1–4.8)
LYMPHOCYTES NFR BLD: 7.7 % (ref 18–48)
MAGNESIUM SERPL-MCNC: 2 MG/DL (ref 1.6–2.6)
MCH RBC QN AUTO: 25.5 PG (ref 27–31)
MCHC RBC AUTO-ENTMCNC: 29.4 G/DL (ref 32–36)
MCV RBC AUTO: 87 FL (ref 82–98)
MONOCYTES # BLD AUTO: 1.3 K/UL (ref 0.3–1)
MONOCYTES NFR BLD: 7.4 % (ref 4–15)
NEUTROPHILS # BLD AUTO: 14.4 K/UL (ref 1.8–7.7)
NEUTROPHILS NFR BLD: 83.4 % (ref 38–73)
NRBC BLD-RTO: 0 /100 WBC
PHOSPHATE SERPL-MCNC: 4.2 MG/DL (ref 2.7–4.5)
PLATELET # BLD AUTO: 159 K/UL (ref 150–450)
PMV BLD AUTO: 11.3 FL (ref 9.2–12.9)
POTASSIUM SERPL-SCNC: 3.8 MMOL/L (ref 3.5–5.1)
PROCALCITONIN SERPL IA-MCNC: 0.84 NG/ML (ref 0–0.5)
PROT SERPL-MCNC: 6.1 G/DL (ref 6–8.4)
RBC # BLD AUTO: 4.43 M/UL (ref 4–5.4)
SODIUM SERPL-SCNC: 137 MMOL/L (ref 136–145)
WBC # BLD AUTO: 17.29 K/UL (ref 3.9–12.7)

## 2024-06-29 PROCEDURE — 87070 CULTURE OTHR SPECIMN AEROBIC: CPT | Performed by: INTERNAL MEDICINE

## 2024-06-29 PROCEDURE — 84145 PROCALCITONIN (PCT): CPT | Performed by: INTERNAL MEDICINE

## 2024-06-29 PROCEDURE — 25000003 PHARM REV CODE 250

## 2024-06-29 PROCEDURE — 25000003 PHARM REV CODE 250: Performed by: INTERNAL MEDICINE

## 2024-06-29 PROCEDURE — 97535 SELF CARE MNGMENT TRAINING: CPT

## 2024-06-29 PROCEDURE — 99233 SBSQ HOSP IP/OBS HIGH 50: CPT | Mod: ,,, | Performed by: INTERNAL MEDICINE

## 2024-06-29 PROCEDURE — 84100 ASSAY OF PHOSPHORUS: CPT | Performed by: INTERNAL MEDICINE

## 2024-06-29 PROCEDURE — 83605 ASSAY OF LACTIC ACID: CPT | Performed by: INTERNAL MEDICINE

## 2024-06-29 PROCEDURE — 36590 REMOVAL TUNNELED CV CATH: CPT | Mod: ,,, | Performed by: SURGERY

## 2024-06-29 PROCEDURE — 83735 ASSAY OF MAGNESIUM: CPT | Performed by: INTERNAL MEDICINE

## 2024-06-29 PROCEDURE — 80053 COMPREHEN METABOLIC PANEL: CPT | Performed by: INTERNAL MEDICINE

## 2024-06-29 PROCEDURE — 99900031 HC PATIENT EDUCATION (STAT)

## 2024-06-29 PROCEDURE — 87040 BLOOD CULTURE FOR BACTERIA: CPT | Mod: 59 | Performed by: INTERNAL MEDICINE

## 2024-06-29 PROCEDURE — 36415 COLL VENOUS BLD VENIPUNCTURE: CPT | Performed by: INTERNAL MEDICINE

## 2024-06-29 PROCEDURE — 97166 OT EVAL MOD COMPLEX 45 MIN: CPT

## 2024-06-29 PROCEDURE — 25000003 PHARM REV CODE 250: Performed by: SURGERY

## 2024-06-29 PROCEDURE — 20000000 HC ICU ROOM

## 2024-06-29 PROCEDURE — 63600175 PHARM REV CODE 636 W HCPCS: Performed by: INTERNAL MEDICINE

## 2024-06-29 PROCEDURE — 94761 N-INVAS EAR/PLS OXIMETRY MLT: CPT

## 2024-06-29 PROCEDURE — 87340 HEPATITIS B SURFACE AG IA: CPT | Performed by: INTERNAL MEDICINE

## 2024-06-29 PROCEDURE — 82962 GLUCOSE BLOOD TEST: CPT

## 2024-06-29 PROCEDURE — 25000003 PHARM REV CODE 250: Performed by: STUDENT IN AN ORGANIZED HEALTH CARE EDUCATION/TRAINING PROGRAM

## 2024-06-29 PROCEDURE — 85025 COMPLETE CBC W/AUTO DIFF WBC: CPT | Performed by: INTERNAL MEDICINE

## 2024-06-29 RX ORDER — METOPROLOL TARTRATE 25 MG/1
25 TABLET, FILM COATED ORAL ONCE
Status: COMPLETED | OUTPATIENT
Start: 2024-06-29 | End: 2024-06-29

## 2024-06-29 RX ORDER — METOPROLOL TARTRATE 1 MG/ML
5 INJECTION, SOLUTION INTRAVENOUS ONCE
Status: COMPLETED | OUTPATIENT
Start: 2024-06-29 | End: 2024-06-29

## 2024-06-29 RX ORDER — LIDOCAINE HYDROCHLORIDE 10 MG/ML
1 INJECTION, SOLUTION EPIDURAL; INFILTRATION; INTRACAUDAL; PERINEURAL ONCE
Status: COMPLETED | OUTPATIENT
Start: 2024-06-29 | End: 2024-06-29

## 2024-06-29 RX ORDER — METOPROLOL TARTRATE 25 MG/1
25 TABLET, FILM COATED ORAL EVERY 6 HOURS
Status: DISCONTINUED | OUTPATIENT
Start: 2024-06-29 | End: 2024-06-29

## 2024-06-29 RX ORDER — GUAIFENESIN 100 MG/5ML
400 SOLUTION ORAL 2 TIMES DAILY
Status: DISCONTINUED | OUTPATIENT
Start: 2024-06-29 | End: 2024-07-03 | Stop reason: HOSPADM

## 2024-06-29 RX ORDER — METOPROLOL TARTRATE 50 MG/1
50 TABLET ORAL 2 TIMES DAILY
Status: DISCONTINUED | OUTPATIENT
Start: 2024-06-29 | End: 2024-06-30

## 2024-06-29 RX ORDER — LIDOCAINE HYDROCHLORIDE 20 MG/ML
10 INJECTION, SOLUTION EPIDURAL; INFILTRATION; INTRACAUDAL; PERINEURAL ONCE
Status: COMPLETED | OUTPATIENT
Start: 2024-06-29 | End: 2024-06-29

## 2024-06-29 RX ADMIN — VANCOMYCIN HYDROCHLORIDE 500 MG: 500 INJECTION, POWDER, LYOPHILIZED, FOR SOLUTION INTRAVENOUS at 06:06

## 2024-06-29 RX ADMIN — HEPARIN SODIUM 5000 UNITS: 5000 INJECTION, SOLUTION INTRAVENOUS; SUBCUTANEOUS at 02:06

## 2024-06-29 RX ADMIN — GUAIFENESIN 400 MG: 200 SOLUTION ORAL at 12:06

## 2024-06-29 RX ADMIN — HEPARIN SODIUM 5000 UNITS: 5000 INJECTION, SOLUTION INTRAVENOUS; SUBCUTANEOUS at 06:06

## 2024-06-29 RX ADMIN — METOPROLOL TARTRATE 25 MG: 25 TABLET, FILM COATED ORAL at 12:06

## 2024-06-29 RX ADMIN — METOPROLOL TARTRATE 5 MG: 5 INJECTION INTRAVENOUS at 02:06

## 2024-06-29 RX ADMIN — CEFEPIME 1 G: 1 INJECTION, POWDER, FOR SOLUTION INTRAMUSCULAR; INTRAVENOUS at 09:06

## 2024-06-29 RX ADMIN — METOPROLOL TARTRATE 5 MG: 1 INJECTION, SOLUTION INTRAVENOUS at 12:06

## 2024-06-29 RX ADMIN — FAMOTIDINE 20 MG: 20 TABLET ORAL at 08:06

## 2024-06-29 RX ADMIN — METOPROLOL TARTRATE 25 MG: 25 TABLET, FILM COATED ORAL at 06:06

## 2024-06-29 RX ADMIN — METOPROLOL TARTRATE 50 MG: 50 TABLET, FILM COATED ORAL at 08:06

## 2024-06-29 RX ADMIN — METOPROLOL TARTRATE 25 MG: 25 TABLET, FILM COATED ORAL at 08:06

## 2024-06-29 RX ADMIN — GUAIFENESIN 400 MG: 200 SOLUTION ORAL at 08:06

## 2024-06-29 RX ADMIN — METOPROLOL TARTRATE 25 MG: 25 TABLET, FILM COATED ORAL at 02:06

## 2024-06-29 RX ADMIN — LIDOCAINE HYDROCHLORIDE 10 MG: 10 INJECTION, SOLUTION EPIDURAL; INFILTRATION; INTRACAUDAL; PERINEURAL at 09:06

## 2024-06-29 RX ADMIN — LIDOCAINE HYDROCHLORIDE 200 MG: 20 INJECTION, SOLUTION EPIDURAL; INFILTRATION; INTRACAUDAL; PERINEURAL at 11:06

## 2024-06-29 RX ADMIN — HEPARIN SODIUM 5000 UNITS: 5000 INJECTION, SOLUTION INTRAVENOUS; SUBCUTANEOUS at 09:06

## 2024-06-29 NOTE — SUBJECTIVE & OBJECTIVE
Interval History:     Seen in am . No CP or SOB . IV infiltration at arm noted.  Leucocytosis noted . Patient is in mild a fib with rvr but not able to increase lopressor with soft BP     Review of Systems  Objective:     Vital Signs (Most Recent):  Temp: 97.6 °F (36.4 °C) (06/29/24 1145)  Pulse: (!) 125 (06/29/24 1200)  Resp: (!) 26 (06/29/24 1200)  BP: 118/78 (06/29/24 0915)  SpO2: 100 % (06/29/24 1145) Vital Signs (24h Range):  Temp:  [97.6 °F (36.4 °C)-98.6 °F (37 °C)] 97.6 °F (36.4 °C)  Pulse:  [] 125  Resp:  [14-59] 26  SpO2:  [79 %-100 %] 100 %  BP: (103-165)/(56-90) 118/78     Weight: 132.3 kg (291 lb 10.7 oz)  Body mass index is 45.68 kg/m².    Intake/Output Summary (Last 24 hours) at 6/29/2024 1218  Last data filed at 6/29/2024 1209  Gross per 24 hour   Intake 1320 ml   Output 3500 ml   Net -2180 ml         Physical Exam  Vitals and nursing note reviewed.   HENT:      Head: Normocephalic and atraumatic.   Eyes:      Conjunctiva/sclera: Conjunctivae normal.   Neck:      Vascular: No JVD.   Cardiovascular:      Rate and Rhythm: Normal rate.      Heart sounds: Normal heart sounds.   Pulmonary:      Effort: Pulmonary effort is normal.   Abdominal:      Palpations: Abdomen is soft.   Musculoskeletal:         General: Normal range of motion.   Skin:     General: Skin is warm.   Neurological:      Mental Status: She is alert and oriented to person, place, and time.   Psychiatric:         Mood and Affect: Mood normal.             Significant Labs: All pertinent labs within the past 24 hours have been reviewed.  CBC:   Recent Labs   Lab 06/28/24  0541 06/29/24  0158   WBC 14.22* 17.29*   HGB 10.6* 11.3*   HCT 35.2* 38.5   * 159     CMP:   Recent Labs   Lab 06/28/24  0541 06/29/24  0058    137   K 3.5 3.8    104   CO2 25 23   GLU 91 121*   BUN 30* 24*   CREATININE 3.2* 3.0*   CALCIUM 8.6* 8.7   PROT 5.7* 6.1   ALBUMIN 2.9* 3.1*   BILITOT 1.0 1.1*   ALKPHOS 86 96   AST 17 17   ALT 6* 7*  "  ANIONGAP 8 10     Cardiac Markers: No results for input(s): "CKMB", "MYOGLOBIN", "BNP", "TROPISTAT" in the last 48 hours.    Significant Imaging: I have reviewed all pertinent imaging results/findings within the past 24 hours.  "

## 2024-06-29 NOTE — NURSING
Nurses Note -- 4 Eyes      6/28/2024   8:55 PM      Skin assessed during: Q Shift Change      [] No Altered Skin Integrity Present    []Prevention Measures Documented      [x] Yes- Altered Skin Integrity Present or Discovered   [x] LDA Added if Not in Epic (Describe Wound)   [x] New Altered Skin Integrity was Present on Admit and Documented in LDA   [x] Wound Image Taken    Wound Care Consulted? Yes    Attending Nurse:  Maribel Strickland RN/Staff Member:  BILLIE

## 2024-06-29 NOTE — PT/OT/SLP EVAL
Occupational Therapy   Evaluation    Name: Juhi Taylor  MRN: [id number]  Admitting Diagnosis: Chronic kidney disease (CKD), stage V  Recent Surgery: Procedure(s) (LRB):  Insertion,catheter (N/A) 6 Days Post-Op    Recommendations:     Discharge Recommendations: Moderate Intensity Therapy  Discharge Equipment Recommendations:  3-in-1 commode, grab bar, other (see comments), rollator (reacher and dressing aids)  Barriers to discharge:  Other (Comment) (Per patient report no home barriers, but health limitations currently.)    Assessment:     Juhi Taylor is a 74 y.o. female with a medical diagnosis of Chronic kidney disease (CKD), stage V.  She presents with medical complications impacting her balance, strength, transfers, endurance, and ADL performance.  Performance deficits affecting function: weakness, impaired endurance, impaired sensation, impaired self care skills, impaired functional mobility, gait instability, impaired balance, visual deficits, decreased upper extremity function, decreased safety awareness, decreased lower extremity function, pain, impaired fine motor, edema.      Rehab Prognosis: Fair; patient would benefit from acute skilled OT services to address these deficits and reach maximum level of function.       Plan:     Patient to be seen 5 x/week to address the above listed problems via self-care/home management, therapeutic activities, therapeutic exercises  Plan of Care Expires: 07/29/24  Plan of Care Reviewed with: patient    Subjective     Chief Complaint: Weakness and Fatigue  Patient/Family Comments/goals: Patient goals are to qualify for rehab after discharge from hospital.     Occupational Profile:  Living Environment: Patient was living in older daughter home with her 3 kids, and she now lives in her home with youngest daughter staying there.   Previous level of function: Patient reports she was completed ADLs with no assistance. She was performing bathing with a wipe down  standing at sink. Patient's daughter assisted with IADLs such as house management, community like shopping, and driving.   Roles and Routines: Caring for herself  Equipment Used at Home: rollator  Assistance upon Discharge: Patient reports she will be able to receive assistance from family and a few friends.     Pain/Comfort:  Pain Rating 1: 3/10  Location - Side 1: Right  Location - Orientation 1: upper  Pain Addressed 1: Distraction    Patients cultural, spiritual, Holiness conflicts given the current situation:      Objective:     Communicated with: RN prior to session.  Patient found up in chair with oxygen, blood pressure cuff, peripheral IV upon OT entry to room.    General Precautions: Standard, fall  Orthopedic Precautions: N/A  Braces: N/A  Respiratory Status: Nasal cannula, flow 5 L/min    Occupational Performance:    Bed Mobility:    Unable to assess due to  being seated in chair upon arrival by standing lift frame.     Functional Mobility/Transfers:  Patient completed Sit <> Stand Transfer with dependence  with  standing lift frame.    Functional Mobility: Patient unable to ambulate at this time.     Activities of Daily Living:  Feeding:  minimum assistance ;assistance opening packets or food items  Grooming: minimum assistance ; assistance reaching areas like the back of head or opening items to complete grooming   Upper Body Dressing: moderate assistance ; required increased time to complete task seated  Lower Body Dressing: total assistance      Cognitive/Visual Perceptual:  Cognitive/Psychosocial Skills:     -       Oriented to: Person, Place, Time, and Situation   -       Follows Commands/attention:Follows multistep  commands  -       Communication: clear/fluent  -       Memory: Impaired Recall  -       Safety awareness/insight to disability: impaired     Physical Exam:  Balance:    -       Patient unable to to stand for balance assessment.  seated in chair with no LOB, but stand  lift frame was left via nurse to be assessable if needed.   Postural examination/scapula alignment:    -       Rounded shoulders  -       Anterior pelvic tilt  Sensation:    -       Impaired  light/touch    Dominant hand:    -       Right  Upper Extremity Range of Motion:     -       Right Upper Extremity: WFL  -       Left Upper Extremity: WFL  Upper Extremity Strength:    -       Right Upper Extremity: Deficits: Impaired throughout entire extremity  -       Left Upper Extremity: Deficits: Impaired throughout entire extremity   Strength:    -       Right Upper Extremity: Deficits: Dropping light items  -       Left Upper Extremity: Deficits: Dropping light items  Fine Motor Coordination:    -       Impaired  Left hand, manipulation of objects impaired and Right hand, manipulation of objects impaired  Gross motor coordination:   WFL    AMPAC 6 Click ADL:  AMPAC Total Score: 12    Treatment & Education:  Patient treatment focused on posture/sitting balance, AROM, strengthening, ADL compensatory strategies    Patient left  up in chair  with all lines intact and guest present    GOALS:   Multidisciplinary Problems       Occupational Therapy Goals          Problem: Occupational Therapy    Goal Priority Disciplines Outcome Interventions   Occupational Therapy Goal     OT, PT/OT     Description: Goals to be met by: 7/20/2024     Patient will increase functional independence with ADLs by performing:    UE Dressing with Supervision.  LE Dressing with Mod A.  Grooming while seated min A.   Toileting from toilet with mod A for hygiene and clothing management.   Patient will tolerance 2 minutes of therapeutic activities displaying fair endurance for improved independence during ADLs.                          History:     Past Medical History:   Diagnosis Date    Anticoagulant long-term use     Arthritis     Breast cancer 2014    invasive lobular carcinoma    Cancer of kidney 11/2020    RIGHT KIDNEY CANCER    CHF  "(congestive heart failure)     Coronary artery disease dx 2005    Depression     Diabetes mellitus     Diastolic heart failure secondary to hypertension     Gout     Hyperlipemia     Hypertension     Hypertrophy of nasal turbinates     Kidney mass 2020    Right    Levoscoliosis     Lung nodule     left    Multiple thyroid nodules     NICOLE (obstructive sleep apnea)     uses C-PAP    Pulmonary hypertension          Past Surgical History:   Procedure Laterality Date    AORTOGRAPHY N/A 12/04/2020    Procedure: Aortogram;  Surgeon: Paul Pedersen MD;  Location: [facility] CATH;  Service: Cardiology;  Laterality: N/A;    BREAST SURGERY      CARDIAC CATHETERIZATION  12/2020    CHOLECYSTECTOMY      COLONOSCOPY      multi -last 2014     CORONARY ARTERY BYPASS GRAFT      ESOPHAGOGASTRODUODENOSCOPY      2012     HAND SURGERY Right     INSERTION OF CATHETER N/A 6/23/2024    Procedure: Insertion,catheter;  Surgeon: Meli Griffin MD;  Location: [facility] OR;  Service: Vascular;  Laterality: N/A;  ORIGINAL CATHETER WAS REPOSITIONED.  NO NEW CATHETER WAS PLACED    INSERTION, CATHETER, DIALYSIS, PERITONEAL N/A 6/4/2024    Procedure: IN Insertion Catheter, Dialysis, Peritoneal - laparoscopic;  Surgeon: Rodriguez Catalan Jr., MD;  Location: [facility] OR;  Service: General;  Laterality: N/A;    LAPAROSCOPIC ROBOT-ASSISTED SURGICAL REMOVAL OF KIDNEY USING DA CHELLE XI Right 03/10/2022    Procedure: XI ROBOTIC NEPHRECTOMY- radical;  Surgeon: Rolando Ramirez MD;  Location: [facility] OR;  Service: Urology;  Laterality: Right;    MASTECTOMY W/ SENTINEL NODE BIOPSY Bilateral 01/21/2015    bilateral "dog ears"    NASAL SINUS SURGERY  2015    Dr Bryant FESS/cauterization turbinate     PARTIAL HYSTERECTOMY  1989    PERCUTANEOUS TRANSLUMINAL BALLOON ANGIOPLASTY OF CORONARY ARTERY  12/04/2020    Procedure: Angioplasty-coronary;  Surgeon: Paul Pedersen MD;  Location: [facility] CATH;  Service: Cardiology;;    RENAL BIOPSY Right     9/20/2021 EJ    TUBAL " LIGATION      ULTRASOUND GUIDANCE  12/04/2020    Procedure: Ultrasound Guidance;  Surgeon: Paul Pedersen MD;  Location: [facility];  Service: Cardiology;;       Time Tracking:     OT Date of Treatment: 06/29/24  OT Start Time: 0945  OT Stop Time: 1012  OT Total Time (min): 27 min    Billable Minutes:Evaluation 15  Self Care/Home Management 12    6/29/2024

## 2024-06-29 NOTE — NURSING
Pt.'s daughter present in room. Pt is calm and cooperative. No complaints. See current lab results in epic. Updated night Hospitalist Dr. Jacobs of pt.'s cardiac rhythm and current lab results. MD states understanding. Will continue current treatment plan.

## 2024-06-29 NOTE — ASSESSMENT & PLAN NOTE
Chronic, controlled. Latest blood pressure and vitals reviewed-     Temp:  [97.6 °F (36.4 °C)-98.6 °F (37 °C)]   Pulse:  []   Resp:  [14-59]   BP: (103-164)/(56-90)   SpO2:  [79 %-100 %] .   Home meds for hypertension were reviewed and noted below.   Hypertension Medications               carvediloL (COREG) 25 MG tablet Take 1 tablet (25 mg total) by mouth 2 (two) times daily.    cloNIDine (CATAPRES) 0.1 MG tablet Take 1 tablet (0.1 mg total) by mouth 3 (three) times daily as needed (PRN SBP > 165 mmHg).    cloNIDine 0.1 mg/24 hr td ptwk (CATAPRES) 0.1 mg/24 hr Place 1 patch onto the skin every 7 days.    furosemide (LASIX) 40 MG tablet Take 40 mg by mouth once daily.    amLODIPine (NORVASC) 10 MG tablet Take 10 mg by mouth once daily.    nitroGLYCERIN (NITROSTAT) 0.4 MG SL tablet Place 1 tablet (0.4 mg total) under the tongue every 5 (five) minutes as needed for Chest pain.            While in the hospital, will manage blood pressure as follows; Continue home antihypertensive regimen    Will utilize p.r.n. blood pressure medication only if patient's blood pressure greater than 140/90 and she develops symptoms such as worsening chest pain or shortness of breath.

## 2024-06-29 NOTE — PROGRESS NOTES
Pharmacokinetic Assessment Follow Up: IV Vancomycin    Vancomycin serum concentration assessment(s):    Vancomycin restarted by Dr. Christensen.     Vancomycin Regimen Plan:    Give Vancomycin 500 mg once and Re-dose when the random level is less than 15 mcg/mL, next level to be drawn at 0430 on 07/01    Drug levels (last 3 results):  Recent Labs   Lab Result Units 06/27/24  0310   Vancomycin, Random ug/mL 14.0       Pharmacy will continue to follow and monitor vancomycin.    Please contact pharmacy at extension [phone] for questions regarding this assessment.    Thank you for the consult,   John Salter       Patient brief summary:  Juhi Taylor is a 74 y.o. female initiated on antimicrobial therapy with IV Vancomycin for treatment of skin & soft tissue infection    Drug Allergies:   Review of patient's allergies indicates:   Allergen Reactions    Percocet [oxycodone-acetaminophen] Itching    Amiodarone analogues      Itching      Irbesartan Swelling    Januvia [sitagliptin]     Jardiance [empagliflozin]      Leg cramps    Lipitor [atorvastatin] Other (See Comments)     Severe leg pain    Linaclotide Other (See Comments) and Nausea And Vomiting     Does not remember    Lubiprostone Other (See Comments) and Palpitations     Does not remember       Actual Body Weight:   132.3 kg    Renal Function:   Estimated Creatinine Clearance: 24.1 mL/min (A) (based on SCr of 3 mg/dL (H)).,     CBC (last 72 hours):  Recent Labs   Lab Result Units 06/27/24  0045 06/27/24  0310 06/28/24  0541 06/29/24  0158   WBC K/uL 9.59 10.33 14.22* 17.29*   Hemoglobin g/dL 11.3* 10.7* 10.6* 11.3*   Hematocrit % 36.3* 34.2* 35.2* 38.5   Platelets K/uL 112* 122* 137* 159   Gran % % 77.7* 75.0* 77.4* 83.4*   Lymph % % 11.2* 13.5* 10.6* 7.7*   Mono % % 8.2 8.2 9.4 7.4   Eosinophil % % 2.3 1.8 1.7 0.9   Basophil % % 0.2 0.2 0.2 0.1   Differential Method  Automated Automated Automated Automated       Metabolic Panel (last 72 hours):  Recent Labs   Lab  Result Units 06/27/24  0045 06/27/24  0310 06/28/24  0541 06/29/24  0058   Sodium mmol/L 134*  --  137 137   Potassium mmol/L 4.1  --  3.5 3.8   Chloride mmol/L 102  --  104 104   CO2 mmol/L 22*  --  25 23   Glucose mg/dL 138*  --  91 121*   BUN mg/dL 41*  --  30* 24*   Creatinine mg/dL 3.5*  --  3.2* 3.0*   Albumin g/dL 3.3*  --  2.9* 3.1*   Total Bilirubin mg/dL 1.0  --  1.0 1.1*   Alkaline Phosphatase U/L 90  --  86 96   AST U/L 25  --  17 17   ALT U/L 5*  --  6* 7*   Magnesium mg/dL 2.0 1.9 1.9 2.0   Phosphorus mg/dL 3.7  --   --  4.2       Vancomycin Administrations:  vancomycin given in the last 96 hours                     vancomycin 500 mg in dextrose 5 % 100 mL IVPB (ready to mix) (mg) 500 mg New Bag 06/26/24 2018                    Microbiologic Results:  Microbiology Results (last 7 days)       Procedure Component Value Units Date/Time    IV catheter culture [[id number]]     Order Status: No result Specimen: Catheter Tip, Subclavian     Blood culture [[id number]] Collected: 06/29/24 1607    Order Status: Sent Specimen: Blood Updated: 06/29/24 1635    Blood culture [[id number]] Collected: 06/29/24 1607    Order Status: Sent Specimen: Blood Updated: 06/29/24 1635    Blood culture [[id number]] Collected: 06/24/24 2016    Order Status: Completed Specimen: Blood from Peripheral, Hand, Right Updated: 06/28/24 2232     Blood Culture, Routine No Growth to date      No Growth to date      No Growth to date      No Growth to date      No Growth to date    Culture, Respiratory with Gram Stain [[id number]] Collected: 06/24/24 1848    Order Status: Completed Specimen: Respiratory from Sputum, Expectorated Updated: 06/26/24 0918     Respiratory Culture No normal respiratory lesley     Gram Stain (Respiratory) <10 epithelial cells per low power field.     Gram Stain (Respiratory) Few WBC's     Gram Stain (Respiratory) No organisms seen

## 2024-06-29 NOTE — NURSING
Pt lying in bed in no acute distress. No complaints. Family present in room. See charted vital signs in epic and telemetry rhythm strips in chart. Pt remains in an irregular cardiac rhythm with a controlled rate. Pt is not symptomatic with current rhythm. Call light within reach and bed alarm on. Will continue current plan of care.

## 2024-06-29 NOTE — RESPIRATORY THERAPY
06/28/24 2006   Patient Assessment/Suction   Level of Consciousness (AVPU) alert   Respiratory Effort Unlabored   Expansion/Accessory Muscles/Retractions no use of accessory muscles;no retractions   All Lung Fields Breath Sounds Anterior:;crackles;diminished   Rhythm/Pattern, Respiratory no shortness of breath reported;unlabored   Cough Frequency infrequent   Cough Type good   Secretions Amount small  (PER PT)   Secretions Color white  (PER PT)   Secretions Characteristics thick  (PER PT)   PRE-TX-O2   Device (Oxygen Therapy) high flow nasal cannula   Flow (L/min) (Oxygen Therapy) 4   SpO2 99 %   Pulse Oximetry Type Continuous   $ Pulse Oximetry - Multiple Charge Pulse Oximetry - Multiple   Pulse 94   Resp (!) 29   Positioning HOB elevated 45 degrees   Preset CPAP/BiPAP Settings   Mode Of Delivery Standby   $ Is patient using? No/refused   Reason patient is not wearing? Patient refused   Who was contacted if refused? Maribel Samson, RN   Education   $ Education 15 min;BiPAP

## 2024-06-29 NOTE — ASSESSMENT & PLAN NOTE
Patient had PD cath placed 2 weeks ago however due to insurance situation never received dialysis  Tunneled dialysis catheter was placed 6/22  Revision 6/23  Patient has persistent oozing from dialysis catheter  and DDAVP given and now stopped   HD as per nephrology

## 2024-06-29 NOTE — ASSESSMENT & PLAN NOTE
EF 25% with elevated BNP  Will need lifevest and AICD as OP   S/p tunnel line and nephrology will do HD daily for now   Amiodarone was on hold for bradycardia and no tachycardia with a fib   Heparin drip was restarted

## 2024-06-29 NOTE — NURSING
Pt.'s heart rate is elevated and she has had a rhythm change. Notified Dr. Jacobs concerning pt.'s status. See documented rhythm change in chart. See new orders for Lopressor IV once in MAR and order for labs in epic. Pt is not symptomatic with rhythm change. Spoke to pt.'s daughter concerning pt.'s status. Pt.'s daughter Maria Luisa states that she is on her way to stay with her mother. Will continue current plan of care.

## 2024-06-29 NOTE — NURSING
Spoke to Dr. Jacobs concerning pt.'s current heart rate and rhythm. See new orders in MAR for Lopressor 25 mg PO once. Family present in room. Pt in no acute distress. Calm and cooperative. Call light within reach and bed alarm on. Will continue current treatment plan.

## 2024-06-29 NOTE — ASSESSMENT & PLAN NOTE
Body mass index is 45.68 kg/m². Morbid obesity complicates all aspects of disease management from diagnostic modalities to treatment.

## 2024-06-29 NOTE — ASSESSMENT & PLAN NOTE
Chronic stable issue  Continue lopressor. Holding home apixab. ( Patient refused to restart )  No more oozing from HD tunnel catheter

## 2024-06-29 NOTE — ASSESSMENT & PLAN NOTE
Stable  Continue with medical management  + troponin . Unsure arrest secondary to cardiac cause . H/o a fib noted  Heparin drip restarted   Amiodarone patient has allergy and lopressor

## 2024-06-29 NOTE — PROGRESS NOTES
[facility] Medicine  Progress Note    Patient Name: Juhi Taylor  MRN: [id number]  Patient Class: IP- Inpatient   Admission Date: 6/18/2024  Length of Stay: 10 days  Attending Physician: Manuel Christensen MD  Primary Care Provider: Tony Sadler MD        Subjective:     Principal Problem:Chronic kidney disease (CKD), stage V        HPI:  73 year old pt getting admitted with fluid overload in need of dialysis  Pt had PD catheter insertion 2 weeks ago  She was supposed to start on PD for ESRD but got delayed due to insurance issues  She has oliguria/increased weight gain in past 2 weeks  Also SOB on exertion and cough  Symptoms got worse and she came to ER and got admitted  Pt was started on lasix gtt iv         Overview/Hospital Course:  Patient is a 73-year-old female who was admitted for acute hypoxic respiratory failure likely multifactorial from CKD and CHF .  Patient had PD cath placed 2 weeks ago however due to insurance situation never received dialysis. EF 40 -45% with elevated BNP. Patient was placed on Furosemide and metolazone with poor urine output eventually leading to dialysis.Nephrology is following. Discontinued antibiotics since infection was ruled out.   Patient got PE cardiac arrest during the dialysis and then resuscitated and got ROSC quickly and admitted to ICU and intubated.  Later initiated hemodialysis again and later was able to extubate.  Patient had significant elevation of troponin cardiology consulted.  No acute intervention recommended because of clinical condition and later patient is not keen to do the angiogram was stress test.  Patient was on heparin but needed to hold for some days because of oozing from the left dialysis catheter access and DDAVP 1 dose given.  Patient also developed thrombocytopenia most likely from dialysis but later platelet count improved and heparin restarted.  Echocardiogram was done and showed about 25% ejection  fraction and Cardiology reviewed and recommended ICD.  Patient also developed AFib with RVR and needed amiodarone drip briefly but patient history of allergy and did not continue.  Patient is getting dialysis and over patient is feeling better and downgraded.  Patient is going to need rehab placement    Interval History:     Seen in am . No CP or SOB . IV infiltration at arm noted.  Leucocytosis noted . Patient is in mild a fib with rvr but not able to increase lopressor with soft BP     Review of Systems  Objective:     Vital Signs (Most Recent):  Temp: 97.6 °F (36.4 °C) (06/29/24 1145)  Pulse: (!) 125 (06/29/24 1200)  Resp: (!) 26 (06/29/24 1200)  BP: 118/78 (06/29/24 0915)  SpO2: 100 % (06/29/24 1145) Vital Signs (24h Range):  Temp:  [97.6 °F (36.4 °C)-98.6 °F (37 °C)] 97.6 °F (36.4 °C)  Pulse:  [] 125  Resp:  [14-59] 26  SpO2:  [79 %-100 %] 100 %  BP: (103-165)/(56-90) 118/78     Weight: 132.3 kg (291 lb 10.7 oz)  Body mass index is 45.68 kg/m².    Intake/Output Summary (Last 24 hours) at 6/29/2024 1218  Last data filed at 6/29/2024 1209  Gross per 24 hour   Intake 1320 ml   Output 3500 ml   Net -2180 ml         Physical Exam  Vitals and nursing note reviewed.   HENT:      Head: Normocephalic and atraumatic.   Eyes:      Conjunctiva/sclera: Conjunctivae normal.   Neck:      Vascular: No JVD.   Cardiovascular:      Rate and Rhythm: Normal rate.      Heart sounds: Normal heart sounds.   Pulmonary:      Effort: Pulmonary effort is normal.   Abdominal:      Palpations: Abdomen is soft.   Musculoskeletal:         General: Normal range of motion.   Skin:     General: Skin is warm.   Neurological:      Mental Status: She is alert and oriented to person, place, and time.   Psychiatric:         Mood and Affect: Mood normal.             Significant Labs: All pertinent labs within the past 24 hours have been reviewed.  CBC:   Recent Labs   Lab 06/28/24  0541 06/29/24  0158   WBC 14.22* 17.29*   HGB 10.6* 11.3*   HCT  "35.2* 38.5   * 159     CMP:   Recent Labs   Lab 06/28/24  0541 06/29/24  0058    137   K 3.5 3.8    104   CO2 25 23   GLU 91 121*   BUN 30* 24*   CREATININE 3.2* 3.0*   CALCIUM 8.6* 8.7   PROT 5.7* 6.1   ALBUMIN 2.9* 3.1*   BILITOT 1.0 1.1*   ALKPHOS 86 96   AST 17 17   ALT 6* 7*   ANIONGAP 8 10     Cardiac Markers: No results for input(s): "CKMB", "MYOGLOBIN", "BNP", "TROPISTAT" in the last 48 hours.    Significant Imaging: I have reviewed all pertinent imaging results/findings within the past 24 hours.    Assessment/Plan:      * Chronic kidney disease (CKD), stage V  Patient had PD cath placed 2 weeks ago however due to insurance situation never received dialysis  Tunneled dialysis catheter was placed 6/22  Revision 6/23  Patient has persistent oozing from dialysis catheter  and DDAVP given and now stopped   HD as per nephrology     Cardiac arrest  Rhythm PEA. Now bradycardiac   Possible cardiac cause . Cardiology on the case   Cannot give blood thinners. Oozing from tunnel catheter  and thrombocytopenia  Severe HF with EF 20% . Need life vest at DC  Cardiology follow up       CHF (congestive heart failure)  EF 25% with elevated BNP  Will need lifevest and AICD as OP   S/p tunnel line and nephrology will do HD daily for now   Amiodarone was on hold for bradycardia and no tachycardia with a fib   Heparin drip was restarted     Morbid obesity  Body mass index is 45.68 kg/m². Morbid obesity complicates all aspects of disease management from diagnostic modalities to treatment.     Acute hypoxic respiratory failure  Patient has severe fluid overload secondary to ESRD, cardiac arrest in HD hemodialysis  Likely multifactorial from CHF with worsening CKD  EF 40-45%  in previous ECHO but repeat with 25% with elevated BNP  Status post tunnel line 6/22  Intubated with cardiac arrest 6/24 and extubated 6/27  HD as per nephrology         Renal cell carcinoma of right kidney  Aware       Paroxysmal atrial " "fibrillation  Chronic stable issue  Continue lopressor. Holding home apixab. ( Patient refused to restart )  No more oozing from HD tunnel catheter      Type 2 diabetes mellitus with hyperglycemia  Patient's FSGs are controlled on current medication regimen.  Last A1c reviewed-   Lab Results   Component Value Date    HGBA1C 6.8 (H) 06/19/2024     Most recent fingerstick glucose reviewed- No results for input(s): "POCTGLUCOSE" in the last 24 hours.  Current correctional scale  Medium  Maintain anti-hyperglycemic dose as follows-   Antihyperglycemics (From admission, onward)      None          Hold Oral hypoglycemics while patient is in the hospital.    Essential hypertension  Chronic, controlled. Latest blood pressure and vitals reviewed-     Temp:  [97.6 °F (36.4 °C)-98.6 °F (37 °C)]   Pulse:  []   Resp:  [14-59]   BP: (103-164)/(56-90)   SpO2:  [79 %-100 %] .   Home meds for hypertension were reviewed and noted below.   Hypertension Medications               carvediloL (COREG) 25 MG tablet Take 1 tablet (25 mg total) by mouth 2 (two) times daily.    cloNIDine (CATAPRES) 0.1 MG tablet Take 1 tablet (0.1 mg total) by mouth 3 (three) times daily as needed (PRN SBP > 165 mmHg).    cloNIDine 0.1 mg/24 hr td ptwk (CATAPRES) 0.1 mg/24 hr Place 1 patch onto the skin every 7 days.    furosemide (LASIX) 40 MG tablet Take 40 mg by mouth once daily.    amLODIPine (NORVASC) 10 MG tablet Take 10 mg by mouth once daily.    nitroGLYCERIN (NITROSTAT) 0.4 MG SL tablet Place 1 tablet (0.4 mg total) under the tongue every 5 (five) minutes as needed for Chest pain.            While in the hospital, will manage blood pressure as follows; Continue home antihypertensive regimen    Will utilize p.r.n. blood pressure medication only if patient's blood pressure greater than 140/90 and she develops symptoms such as worsening chest pain or shortness of breath.    Coronary artery disease  Stable  Continue with medical management  + " troponin . Unsure arrest secondary to cardiac cause . H/o a fib noted  Heparin drip restarted   Amiodarone patient has allergy and lopressor       VTE Risk Mitigation (From admission, onward)           Ordered     heparin (porcine) injection 5,000 Units  Every 8 hours         06/28/24 1806     IP VTE HIGH RISK PATIENT  Once         06/18/24 1519     Place sequential compression device  Until discontinued         06/18/24 1519                    Discharge Planning   HARINI: 7/2/2024     Code Status: Full Code   Is the patient medically ready for discharge?:     Reason for patient still in hospital (select all that apply): Treatment  Discharge Plan A: Rehab            Critical care time spent on the evaluation and treatment of severe organ dysfunction, review of pertinent labs and imaging studies, discussions with consulting providers and discussions with patient/family: 33 minutes.      Manuel Christensen MD  Department of Hospital Medicine   [facility]

## 2024-06-29 NOTE — PROGRESS NOTES
Pulmonary/Critical Care   Progress Note      PATIENT NAME: Juhi Taylor  MRN: [id number]  TODAY'S DATE: 2024  5:28 PM  ADMIT DATE: 2024  AGE: 74 y.o. : 1950    CONSULT REQUESTED BY: Manuel Christensen MD    REASON FOR CONSULT:   Cardiac arrest     HPI:  Ms Taylor is a 73-year-old woman who presents with cardiac arrest.    Code blue was called on  evening, she was unresponsive.  Chest compressions were already underway.  Rhythm was consistent with PE a arrest initially, after giving epinephrine, the rhythm appeared to change to ventricular tachycardia.  We cardioverted and patient achieved ROSC.  Additionallly she received bicarbonate and glucose.     Today:   Did well with SBT- tolerating dialysis.     2024 0 Stable overnight, has been tolerating dialysis.  Her mentation is cloudy.  No respiratory complaints and she remains stable extubated. CXR with CM, better edema, ECHO noted with EF 25%    2024 - STable overnight, no new respiratory complaints reported.  Back in AF with increased HR (being addressed), LE dopplers negative for DVT    Review of Systems   Constitutional:  Positive for activity change. Negative for appetite change, chills, fatigue and fever.   HENT:  Positive for congestion. Negative for hearing loss, nosebleeds, postnasal drip, sneezing and sore throat.    Respiratory:  Positive for cough and shortness of breath. Negative for apnea, choking, chest tightness, wheezing and stridor.    Cardiovascular:  Positive for leg swelling. Negative for chest pain and palpitations.   Gastrointestinal:  Negative for abdominal distention, abdominal pain, constipation, diarrhea and nausea.   Genitourinary:  Negative for dysuria, frequency and urgency.   Musculoskeletal:  Negative for arthralgias and myalgias.   Neurological:  Positive for weakness. Negative for syncope and numbness.   Hematological:  Negative for adenopathy.   Psychiatric/Behavioral:  Negative for dysphoric  mood. The patient is not nervous/anxious.            ALLERGIES  Review of patient's allergies indicates:   Allergen Reactions    Percocet [oxycodone-acetaminophen] Itching    Amiodarone analogues      Itching      Irbesartan Swelling    Januvia [sitagliptin]     Jardiance [empagliflozin]      Leg cramps    Lipitor [atorvastatin] Other (See Comments)     Severe leg pain    Linaclotide Other (See Comments) and Nausea And Vomiting     Does not remember    Lubiprostone Other (See Comments) and Palpitations     Does not remember       INPATIENT SCHEDULED MEDICATIONS   epoetin alaina-epbx  50 Units/kg Intravenous Every Mon, Wed, Fri    famotidine  20 mg Oral Daily    guaiFENesin 100 mg/5 ml  400 mg Oral BID    heparin (porcine)  5,000 Units Subcutaneous Q8H    metoprolol tartrate  25 mg Oral Q6H           MEDICAL AND SURGICAL HISTORY  Past Medical History:   Diagnosis Date    Anticoagulant long-term use     Arthritis     Breast cancer 2014    invasive lobular carcinoma    Cancer of kidney 11/2020    RIGHT KIDNEY CANCER    CHF (congestive heart failure)     Coronary artery disease dx 2005    Depression     Diabetes mellitus     Diastolic heart failure secondary to hypertension     Gout     Hyperlipemia     Hypertension     Hypertrophy of nasal turbinates     Kidney mass 2020    Right    Levoscoliosis     Lung nodule     left    Multiple thyroid nodules     NICOLE (obstructive sleep apnea)     uses C-PAP    Pulmonary hypertension      Past Surgical History:   Procedure Laterality Date    AORTOGRAPHY N/A 12/04/2020    Procedure: Aortogram;  Surgeon: Paul Pedersen MD;  Location: [facility];  Service: Cardiology;  Laterality: N/A;    BREAST SURGERY      CARDIAC CATHETERIZATION  12/2020    CHOLECYSTECTOMY      COLONOSCOPY      multi -last 2014     CORONARY ARTERY BYPASS GRAFT      ESOPHAGOGASTRODUODENOSCOPY      2012     HAND SURGERY Right     INSERTION OF CATHETER N/A 6/23/2024    Procedure: Insertion,catheter;  Surgeon: Gaby  "MD Meli;  Location: [facility] OR;  Service: Vascular;  Laterality: N/A;  ORIGINAL CATHETER WAS REPOSITIONED.  NO NEW CATHETER WAS PLACED    INSERTION, CATHETER, DIALYSIS, PERITONEAL N/A 2024    Procedure: IN Insertion Catheter, Dialysis, Peritoneal - laparoscopic;  Surgeon: Rodriguez Catalan Jr., MD;  Location: [facility] OR;  Service: General;  Laterality: N/A;    LAPAROSCOPIC ROBOT-ASSISTED SURGICAL REMOVAL OF KIDNEY USING DA CHELLE XI Right 03/10/2022    Procedure: XI ROBOTIC NEPHRECTOMY- radical;  Surgeon: Rolando Ramirez MD;  Location: [facility] OR;  Service: Urology;  Laterality: Right;    MASTECTOMY W/ SENTINEL NODE BIOPSY Bilateral 2015    bilateral "dog ears"    NASAL SINUS SURGERY      Dr Bryant FESS/cauterization turbinate     PARTIAL HYSTERECTOMY      PERCUTANEOUS TRANSLUMINAL BALLOON ANGIOPLASTY OF CORONARY ARTERY  2020    Procedure: Angioplasty-coronary;  Surgeon: Paul Pedersen MD;  Location: [facility] CATH;  Service: Cardiology;;    RENAL BIOPSY Right     2021 EJ    TUBAL LIGATION      ULTRASOUND GUIDANCE  2020    Procedure: Ultrasound Guidance;  Surgeon: Paul Pedersen MD;  Location: [facility] CATH;  Service: Cardiology;;       ALCOHOL, TOBACCO AND DRUG USE  Social History     Tobacco Use   Smoking Status Former    Current packs/day: 0.00    Types: Cigarettes    Start date: 2016    Quit date: 2016    Years since quittin.5   Smokeless Tobacco Never   Tobacco Comments    quit      Social History     Substance and Sexual Activity   Alcohol Use No     Social History     Substance and Sexual Activity   Drug Use No       FAMILY HISTORY  Family History   Problem Relation Name Age of Onset    Breast cancer Mother      Stroke Father Waqar Sr.     Hypertension Father Waqar Sr.     Hepatitis Brother      Asthma Daughter Nell Taylor     Birth defects Daughter Nell Camejo's two children has cleft lips    Depression Daughter Nell Taylor     " Drug abuse Daughter Nell Taylor     Learning disabilities Bam Taylor     Mental illness Bam Taylor     Breast cancer Maternal Aunt      Glaucoma Sister      Drug abuse Daughter Nell     Macular degeneration Neg Hx      Retinal detachment Neg Hx         VITAL SIGNS (MOST RECENT)  Temp: 97.6 °F (36.4 °C) (06/29/24 1145)  Pulse: (!) 123 (06/29/24 1300)  Resp: (!) 26 (06/29/24 1300)  BP: 109/61 (06/29/24 1300)  SpO2: 97 % (06/29/24 1300)    INTAKE AND OUTPUT (LAST 24 HOURS):  Intake/Output Summary (Last 24 hours) at 6/29/2024 1319  Last data filed at 6/29/2024 1209  Gross per 24 hour   Intake 1320 ml   Output 3500 ml   Net -2180 ml       WEIGHT  Wt Readings from Last 1 Encounters:   06/29/24 132.3 kg (291 lb 10.7 oz)       Physical Exam  Vitals reviewed.   Constitutional:       General: She is not in acute distress.     Appearance: She is well-developed. She is obese. She is ill-appearing (chronic). She is not toxic-appearing or diaphoretic.   HENT:      Head: Normocephalic and atraumatic.      Mouth/Throat:      Pharynx: No oropharyngeal exudate or posterior oropharyngeal erythema.   Eyes:      General: No scleral icterus.     Pupils: Pupils are equal, round, and reactive to light.   Neck:      Vascular: No JVD.   Cardiovascular:      Rate and Rhythm: Normal rate and regular rhythm.      Heart sounds: Normal heart sounds. No murmur heard.  Pulmonary:      Effort: Pulmonary effort is normal. No respiratory distress.      Breath sounds: No wheezing or rales.      Comments: Decreased at bases  Abdominal:      General: Bowel sounds are normal. There is distension.      Palpations: Abdomen is soft.      Tenderness: There is no abdominal tenderness.   Musculoskeletal:         General: No swelling.      Cervical back: Normal range of motion and neck supple. No rigidity.      Right lower leg: Edema present.      Left lower leg: Edema present.   Skin:     General: Skin is warm.      Capillary  "Refill: Capillary refill takes less than 2 seconds.      Coloration: Skin is not pale.      Findings: No rash.   Neurological:      General: No focal deficit present.      Mental Status: She is alert and oriented to person, place, and time.      Cranial Nerves: No cranial nerve deficit.      Motor: Weakness present. No abnormal muscle tone.   Psychiatric:      Comments: Calm            ACUTE PHASE REACTANT (LAST 24 HOURS)  No results for input(s): "FERRITIN", "CRP", "LDH", "DDIMER" in the last 24 hours.    CBC LAST (LAST 24 HOURS)  Recent Labs   Lab 06/29/24  0158   WBC 17.29*   RBC 4.43   HGB 11.3*   HCT 38.5   MCV 87   MCH 25.5*   MCHC 29.4*   RDW 20.1*      MPV 11.3   GRAN 83.4*  14.4*   LYMPH 7.7*  1.3   MONO 7.4  1.3*   BASO 0.02   NRBC 0       CHEMISTRY LAST (LAST 24 HOURS)  Recent Labs   Lab 06/29/24  0058      K 3.8      CO2 23   ANIONGAP 10   BUN 24*   CREATININE 3.0*   *   CALCIUM 8.7   MG 2.0   ALBUMIN 3.1*   PROT 6.1   ALKPHOS 96   ALT 7*   AST 17   BILITOT 1.1*       COAGULATION LAST (LAST 24 HOURS)  No results for input(s): "LABPT", "INR", "APTT" in the last 24 hours.      CARDIAC PROFILE (LAST 24 HOURS)  Recent Labs   Lab 06/27/24  0045   *   TROPONINIHS 52.4*       LAST 7 DAYS MICROBIOLOGY   Microbiology Results (last 7 days)       Procedure Component Value Units Date/Time    Blood culture [[id number]] Collected: 06/24/24 2016    Order Status: Completed Specimen: Blood from Peripheral, Hand, Right Updated: 06/28/24 2232     Blood Culture, Routine No Growth to date      No Growth to date      No Growth to date      No Growth to date      No Growth to date    Culture, Respiratory with Gram Stain [[id number]] Collected: 06/24/24 1848    Order Status: Completed Specimen: Respiratory from Sputum, Expectorated Updated: 06/26/24 0918     Respiratory Culture No normal respiratory lesley     Gram Stain (Respiratory) <10 epithelial cells per low power field.     Gram Stain " (Respiratory) Few WBC's     Gram Stain (Respiratory) No organisms seen            MOST RECENT IMAGING  US Lower Extremity Veins Bilateral  Narrative: EXAMINATION:  US LOWER EXTREMITY VEINS BILATERAL    CLINICAL HISTORY:  de oxygenation and swelling;    FINDINGS:  Grayscale, color and spectral Doppler analysis of the bilateral lower extremity deep venous system was performed. Comparison to prior exams.    There is normal compressibility, with normal flow by color and spectral Doppler analysis in the bilateral lower extremity deep venous system.  There is normal augmentation and no evidence of deep venous thrombosis.    There is a 50 x 28 x 24 mm fusiform, heterogeneously echoic complex cyst in the right popliteal fossa, with increased through transmission of sound, and no internal color Doppler vascular flow.  Impression: 1. Negative for lower extremity deep venous thrombosis.  2. Right popliteal fossa complex Baker's cyst.    Electronically signed by: Niko Mendiola  Date:    06/29/2024  Time:    13:15      CURRENT VISIT EKG  Results for orders placed or performed during the hospital encounter of 06/18/24   EKG 12-lead    Narrative    Ordered by an unspecified provider.       ECHOCARDIOGRAM RESULTS  Results for orders placed during the hospital encounter of 06/18/24    Echo    Interpretation Summary    Left Ventricle: The left ventricle is moderately dilated. Moderately increased wall thickness. There is moderate concentric hypertrophy. Moderate global hypokinesis present. There is mildly reduced systolic function with a visually estimated ejection fraction of 40 - 45%. Unable to assess diastolic function due to atrial fibrillation.    Right Ventricle: Normal right ventricular cavity size. Wall thickness is normal. Systolic function is borderline low.    Left Atrium: Left atrium is mildly dilated.    Right Atrium: Right atrium is mildly dilated.    Mitral Valve: There is mild regurgitation with a centrally directed  jet.    Tricuspid Valve: There is mild regurgitation with a centrally directed jet.    Pulmonary Artery: There is moderate to severe pulmonary hypertension. The estimated pulmonary artery systolic pressure is 56 mmHg.    IVC/SVC: Elevated venous pressure at 15 mmHg.        VENTILATOR INFORMATION              LAST ARTERIAL BLOOD GAS  ABG  Recent Labs   Lab 06/27/24  0539   PH 7.434   PO2 78*   PCO2 34.3*   HCO3 23.0*   BE -1       ASSESSMENT:   Cardiac arrest  Hyperkalemia   Hx of vascular disease   UTI  NICOLE and probable OHS   Chronic hypercapneic respiratory failure   7.   Ventricular tachycardia   8.  Thrombocytopenia     Still not clear on etiology of arrest but may be a NSTEMI precipitated by dialysis.  ECHO has evidence of reduced EF.     Neuro exam is promising today. She opens eyes and is moving extremities. Tolerating dialysis and is off of vasopressors.     Plan for extubation   PLAN:   - Continue broad spectrum antibiotics  - stable extubated and looking better today  - Appreciate nephrology and cardiology consultants assistance   - Defer management of NSTEMI and EF to cardiology  -  I am told that she may need AICD  - try to increase activity  - Remove antony   - high risk for decompensation      Kendall Brady MD  [facility] Pulmonary/Critical Care  06/29/2024

## 2024-06-29 NOTE — PT/OT/SLP PROGRESS
Physical Therapy      Patient Name:  Juhi Taylor   MRN:  [id number]    Patient not seen today secondary to Other (Comment), Patient fatigue. Was up in chair with nursing and just returned to bed Will follow-up 6/30/24.

## 2024-06-29 NOTE — ASSESSMENT & PLAN NOTE
Rhythm PEA. Now bradycardiac   Possible cardiac cause . Cardiology on the case   Cannot give blood thinners. Oozing from tunnel catheter  and thrombocytopenia  Severe HF with EF 20% . Need life vest at DC  Cardiology follow up

## 2024-06-29 NOTE — NURSING
Pt lying in bed in no acute distress. See ongoing assessment and charted vital signs in epic. See telemetry strip in chart. Family present in room. Gave scheduled enema per md order. See MAR for md orders. Pt tolerated well. See charted intake and output in epic. Call light within reach and bed alarm on. Will continue current plan of care.

## 2024-06-29 NOTE — PLAN OF CARE
Patient found lying in bed awake. She had refused bipap overnight. She is saturating well on a little nasal cannula 2-4 lpm as before. I was told she had some sundowning / mild icu delirium last night and had episodes of anger. For me she is pleasant and orientated and calm. She went back into afib last night rates 110s-120s, and given metoprolol. Defer dialysis today per nephrology given that. Her systolic is running 110s after afib conversion. On the heparin now.    She is able to more strongly clear her throat / cough - I have encouraged her to do so / encourage pulmonary toilet. Eating well most of breakfast and lunch and nepros.     Got her up in the chair with max assist and dayanara steady. She could not support her weight standing. She was able to support balance weight while siting edge of bed. She was up in the chair for nearly 3 hours did ok. Back to bed. Was difficult again to get her up out of the chair took 3 people and dayanara steady.     Voiding small amount unmeasured (missed purewick / contaminated with stool ) - bladder scan didn't demonstrate anything.     PD catheter secured with clean/dry gauze as before, changed by previous shift.     Yesterday WOC rn at bedside to assess, pictures taken, dressed. The infiltrate site to right arm shown to md jhaveri - woc rn assesses send pic to dr ortiz , dressed by her. Keep ivs to left arm in place avoid right arm iv for now per md.     SCDs  in place / on. Elevate legs. Mepilexes maintained bilat heels and sacrum. Float heels.     Cbg fingerstick not actionable.     Still with some edema to bilat legs and arms but improving still from day to day     She had a couple loose bms but were all very small usually just a kamini on the pad. She wanted to get on the commode - done. with just smalla mount of loose / liquid bm. There seems to be some soft stool in vault discussed w/ md will try soap suds enema.    The bed is low and alarm on. Clinical alarms reviewed and  armed. Monitor strip printed and reviewed.    Family at bedside and updated on plan of care. April requests bilat carotid ultrasound message left with md.    ...  Afib accelerating to 130 discussed w/ cardiology increased metoprolol given, little effect, will give ivp metoprolol.    She had had some soft stool in vault and discussed w/ md given tapwater enema by coworker (thank you) moderate+ loose bm patient tolerated.    Dressing change to right forearm + upperarm, cleaned with chg soap, dried, xeroform applied, gauze wrap. Looks better today. Still with a few blisters. Md aware.    ...    Went to do sterile dressing change with aseptic technique left subclavian tunneled hd catheter, and the site looks red, with some superficial skin loss and yellow slough around the catheter and adherent clot I can't get off. I cleaned it best I could with chg and applied new chg dressing but took picture and sent to md, with tenative plan to remove (consult surgery) and culture tip.    Additional metoprolol (even iv) had not been helpful and so discussed w/ md and discussion was that heart rate may be from infection and so given that and wbc although no fever, blood cultures done, lactic acid done, started on antibiotic.    Carotid ultrasound bilat and venous doppler legs bilat not impressive.  ...  Vancomycin started. Flushed proximal iv before starting, and could feel flow along vein proximally, no site complications or c/o. Thought there may have been small amount of diffuse swelling to site but could not be certain, so looked with ultrasound, brisk signal of flow through vein with flushing and didn't see fluid planes extravasating, not painful not cold. This was at shift change, defer continuing observation to oncoming shift (thank you).

## 2024-06-29 NOTE — PROGRESS NOTES
Nephrology Progress Note        Patient Name: Juhi Taylor  MRN: [id number]    Patient Class: IP- Inpatient   Admission Date: 6/18/2024  Length of Stay: 10 days  Date of Service: 6/29/2024    Attending Physician: Manuel Christensen MD  Primary Care Provider: Tony Sadler MD    Reason for Consult: josue    SUBJECTIVE:     HPI: 73F fallowed with Dr. Martinez from nephrology for advanced CKD stage 4-5 getting admitted with fluid overload. Pt had PD catheter insertion 2 weeks ago in preparation for initiation of dialysis, however had post-operative issues with increased leakage. Then her PD in initiation and training was delayed due to insurance issues. She has oliguria/increased weight gain in past 2 weeks, SOB on exertion and cough. Symptoms got worse and she came to ER and got admitted.     I saw her in the ER and recommend trial of lasix gtt, cassia. Will consult Dr. Catalan to evaluate patient and make recommendation for catheter care - we probably will not be able to use it for now for dialysis.    6/19 VSS. Only 175cc UO documented, but may be inaccurately charted - pt boarding in ER. Will increase lasix gtt to 10mg/hr and ensure at least q shift UO documentation.    6/20 VSS. BP low, stop Clonidine patch. Increase lasix gtt to 15mg/hr, add metolazone. Stop Fluconazole in 1-2 days after stopping IV abx for suspected PNA. Appreciate Surgery input - will d/w patient initiation of HD tomorrow if unable to produce much UO and symptomatic improvement...    6/21 VSS. Will discuss initiation of dialysis via HD, she is not responding to diuretics.    6/22 VSS. Plan for HD today after dialysis access placement. Seen on HD, BF around 275 cc/min max, otherwise tolerating well. Will have to be repositioned in cath lab by Dr. Griffin on Monday.    6/23 VSS. Getting HD cath repositioned today, plan HD tomorrow.    6/24 VSS, on 2-3L NC, first HD 6/22, second HD today    6/25 105 min into HD treatment,  patient became lethargic acutely (was conversing/calm)- gave NS bolus- went into PEA arrest- had ACLS with ROSC- net UF 750cc, intubated and transferred to ICU, bradycardic, SBP 110s, FIO2 35%, UOP 100cc, required amio and levophed but now off- mld rise in trop noted, lactic acid normal, CXR clear, echo pending    6/26 tolerated HD yest- got off 2L- echo with EF 20-25% with grade III DD which is much worse compared to < 1 week ago with EF 40-45% suggesting cardiac event- trop peaked- per cards plan for life vest and may need LHC- HR 50s, -160s, FIO2 30%, UOP 550cc  - seen on HD  - bleeding at TDC exit site is better    6/27 tolerated HD well yest- got off 3L- -160, FIO2 30%, UOP 400cc    6/28 tolerated HD with restraints yest- got off 4L, SBP > 150, on 2L NC, ordered 4L UF- niece who is a dialysis nurse asked that we only take off 3L, UOP 245cc  - seen on HD  - conversant, calm- still very edematous    6/29 elevated HR (AF), SBP stable, on 4L NC, no UOP, WBC is going up, CXR yest with improvement in pulm edema, some blisters on her arm getting wound care (infiltrated IV)- ? Infx  source- sitting in chair- lung movement better, c/w edema, more alert today    ASSESSMENT/PLAN:     PEA/VT arrest during dialysis 6/24  - echo shows newly depressed EF supportive of a cardiac event- could have been precipitated by myocardial stress during dialysis- don't feel that trop rise is c/w an acute NSTEMI however- will work on optimization of volume status in prep for LHC and life vest- on beta blocker- appreciate cardiology recs    Elevated HR  Relative Hypotension  Leukocytosis  - concern for sepsis  - CXR yest with improvement in pulm edema  - minimal UOP to warrant UA, UCx  - I see increased O2 requirement- consider workup for DVT/PE (message sent to )  - could be blisters on her am- consider antbx coverage    CKD stage 5- uremia, hyperkalemia, oliguria  PD cathether in place  Initiated on RRT via IHD this  "admission using TDC  - hold HD today due to hemodynamic instability and concern for sepsis - hope to resume Monday  - renal diet, 1.5L fluid restriction  - will need outpatient HD unit placement- plan for discharge to [place] [facility] in interim  - dose meds for CrCl < 10/HD    Anemia of CKD  Thrombocytopenia  - continue PONCE with HD  - resume heparin flushes once plat > 150 due to sluggish flow in catheter but will use small dose of heparin and assess    MBD / Secondary HPT  - phos is at goal- continue renal based nutrition  - last PTH acceptable    Baseline HTN  Volume overload/S CHF exacerbation  Hyponatremia  - improving  - continue UF with HD    HypoK/HypoMg  - adjust dialysate- may supplement PRN    Updated family at bedside    Thank you for allowing us to participate in the care of your patient!   We will follow the patient and provide recommendations as needed.         Laboratory:  Recent Labs   Lab 06/27/24  0045 06/28/24  0541 06/29/24  0058   * 137 137   K 4.1 3.5 3.8    104 104   CO2 22* 25 23   BUN 41* 30* 24*   CREATININE 3.5* 3.2* 3.0*   * 91 121*       Recent Labs   Lab 06/24/24  0543 06/24/24  2016 06/27/24  0045 06/27/24  0310 06/28/24  0541 06/29/24  0058   CALCIUM 9.0   < > 8.6*  --  8.6* 8.7   ALBUMIN  --    < > 3.3*  --  2.9* 3.1*   PHOS 6.0*  --  3.7  --   --  4.2   MG 2.6   < > 2.0 1.9 1.9 2.0    < > = values in this interval not displayed.       Recent Labs   Lab 10/10/23  1113 01/22/24  1133 05/08/24  1139   PTH, Intact 583.1 H 506.7 H 291.7 H       No results for input(s): "POCTGLUCOSE" in the last 168 hours.    Recent Labs   Lab 07/05/23  1533 01/22/24  1133 06/19/24  0443   Hemoglobin A1C 5.6 5.4 6.8 H       Recent Labs   Lab 06/27/24  0310 06/27/24  0415 06/27/24  0539 06/28/24  0541 06/29/24  0158   WBC 10.33  --   --  14.22* 17.29*   HGB 10.7*  --   --  10.6* 11.3*   HCT 34.2*   < > 39 35.2* 38.5   *  --   --  137* 159   MCV 81*  --   --  85 87   MCHC 31.3*  --   " --  30.1* 29.4*   MONO 8.2  0.9  --   --  9.4  1.3* 7.4  1.3*   EOSINOPHIL 1.8  --   --  1.7 0.9    < > = values in this interval not displayed.       Recent Labs   Lab 06/27/24  0045 06/28/24  0541 06/29/24  0058   BILITOT 1.0 1.0 1.1*   BILIDIR 0.4*  --   --    PROT 6.0 5.7* 6.1   ALBUMIN 3.3* 2.9* 3.1*   ALKPHOS 90 86 96   ALT 5* 6* 7*   AST 25 17 17       Recent Labs   Lab 03/17/22  2240 04/07/22  1536 04/17/23  1448 07/14/23  2026 03/14/24  2254 05/09/24  1029 06/23/24  1816   Color, UA Colorless A   < > Colorless A  --  Yellow Yellow  --    Appearance, UA Clear   < > Clear  --  Clear Clear  --    pH, UA 5.0   < > 6.0  --  6.0 6.0  --    Specific [place], UA 1.010   < > 1.010  --  1.010 1.015  --    Protein, UA Negative   < > 1+ A  --  1+ A 1+ A  --    Glucose, UA Negative   < > Negative  --  Negative Negative  --    Ketones, UA Negative   < > Negative  --  Negative Negative  --    Urobilinogen, UA Negative  --   --   --  Negative  --   --    Bilirubin (UA) Negative   < > Negative  --  Negative Negative  --    Occult Blood UA Negative   < > Negative  --  TRACE Negative  --    Nitrite, UA Negative   < > Negative  --  Negative Negative  --    RBC, UA  --    < > 3   < > 3 1 >100 H   WBC, UA  --    < > 3   < > 5 1 >100 H   Bacteria  --    < > None   < > None Rare Many A   Hyaline Casts, UA  --    < > 0  --  0 0  --     < > = values in this interval not displayed.       Recent Labs   Lab 06/26/24  1322 06/27/24  0415 06/27/24  0539   POC PH 7.440 7.437 7.434   POC PCO2 39.0 35.8 34.3 L   POC HCO3 26.5 24.1 23.0 L   POC PO2 95 72 L 78 L   POC SATURATED O2 98 95 96   POC BE 2 0 -1   Sample ARTERIAL ARTERIAL ARTERIAL       Microbiology Results (last 7 days)       Procedure Component Value Units Date/Time    Blood culture [[id number]] Collected: 06/24/24 2016    Order Status: Completed Specimen: Blood from Peripheral, Hand, Right Updated: 06/28/24 2232     Blood Culture, Routine No Growth to date      No Growth to  date      No Growth to date      No Growth to date      No Growth to date    Culture, Respiratory with Gram Stain [[id number]] Collected: 06/24/24 1848    Order Status: Completed Specimen: Respiratory from Sputum, Expectorated Updated: 06/26/24 0918     Respiratory Culture No normal respiratory lesley     Gram Stain (Respiratory) <10 epithelial cells per low power field.     Gram Stain (Respiratory) Few WBC's     Gram Stain (Respiratory) No organisms seen            Review of patient's allergies indicates:   Allergen Reactions    Percocet [oxycodone-acetaminophen] Itching    Amiodarone analogues      Itching      Irbesartan Swelling    Januvia [sitagliptin]     Jardiance [empagliflozin]      Leg cramps    Lipitor [atorvastatin] Other (See Comments)     Severe leg pain    Linaclotide Other (See Comments) and Nausea And Vomiting     Does not remember    Lubiprostone Other (See Comments) and Palpitations     Does not remember       Outpatient meds:  No current facility-administered medications on file prior to encounter.     Current Outpatient Medications on File Prior to Encounter   Medication Sig Dispense Refill    allopurinoL (ZYLOPRIM) 300 MG tablet Take 1 tablet (300 mg total) by mouth once daily. 90 tablet 3    amLODIPine (NORVASC) 10 MG tablet Take 10 mg by mouth once daily.      calcitRIOL (ROCALTROL) 0.5 MCG Cap Take 0.5 mcg by mouth once daily.      carvediloL (COREG) 25 MG tablet Take 1 tablet (25 mg total) by mouth 2 (two) times daily. 180 tablet 2    cyanocobalamin (VITAMIN B-12) 100 MCG tablet Take 100 mcg by mouth once daily.      evolocumab (REPATHA SURECLICK) 140 mg/mL PnIj Inject 1 mL (140 mg total) into the skin every 14 (fourteen) days. 2 mL 11    furosemide (LASIX) 40 MG tablet Take 40 mg by mouth once daily.      loratadine (CLARITIN) 10 mg tablet Take 10 mg by mouth once daily.      magnesium oxide (MAG-OX) 400 mg (241.3 mg magnesium) tablet Take 1 tablet (400 mg total) by mouth daily as needed  (cramping). 30 tablet 0    blood sugar diagnostic (TRUE METRIX GLUCOSE TEST STRIP) Strp Use 1x daily. Insurance preferred. 100 strip 3    blood sugar diagnostic Strp To check BG 1 time daily, to use with insurance preferred meter 100 strip 3    cloNIDine (CATAPRES) 0.1 MG tablet Take 1 tablet (0.1 mg total) by mouth 3 (three) times daily as needed (PRN SBP > 165 mmHg). 90 tablet 6    cloNIDine 0.1 mg/24 hr td ptwk (CATAPRES) 0.1 mg/24 hr Place 1 patch onto the skin every 7 days. 4 patch 4    lancets Misc To check BG 1 times daily, to use with insurance preferred meter 100 each 3    nitroGLYCERIN (NITROSTAT) 0.4 MG SL tablet Place 1 tablet (0.4 mg total) under the tongue every 5 (five) minutes as needed for Chest pain. 25 tablet 0    [DISCONTINUED] aspirin (ECOTRIN) 81 MG EC tablet Take 1 tablet (81 mg total) by mouth once daily. 90 tablet 3    [DISCONTINUED] DULoxetine (CYMBALTA) 20 MG capsule TAKE ONE CAPSULE BY MOUTH ONCE DAILY 30 capsule 4    [DISCONTINUED] folic acid (FOLVITE) 1 MG tablet Take 1 mg by mouth once daily.      [DISCONTINUED] metFORMIN (GLUCOPHAGE-XR) 500 MG ER 24hr tablet Take 2 tablets (1,000 mg total) by mouth 2 (two) times daily with meals. 360 tablet 3    [DISCONTINUED] sodium bicarbonate 650 MG tablet Take 1 tablet (650 mg total) by mouth once daily. 30 tablet 11       Scheduled meds:   epoetin alaina-epbx  50 Units/kg Intravenous Every Mon, Wed, Fri    famotidine  20 mg Oral Daily    heparin (porcine)  5,000 Units Subcutaneous Q8H    metoprolol tartrate  25 mg Oral BID       Infusions:          PRN meds:    Current Facility-Administered Medications:     acetaminophen, 650 mg, Oral, Q8H PRN    aluminum-magnesium hydroxide-simethicone, 30 mL, Oral, QID PRN    dextrose 50%, 12.5 g, Intravenous, PRN    dextrose 50%, 25 g, Intravenous, PRN    glucagon (human recombinant), 1 mg, Intramuscular, PRN    glucose, 24 g, Oral, PRN    melatonin, 6 mg, Oral, Nightly PRN    ondansetron, 4 mg, Intravenous, Q6H  PRN      OBJECTIVE:     Vital Signs and IO:  Temp:  [98.2 °F (36.8 °C)-98.6 °F (37 °C)]   Pulse:  []   Resp:  [14-52]   BP: (103-169)/(56-90)   SpO2:  [79 %-99 %]   I/O last 3 completed shifts:  In: 980 [P.O.:460; Other:500; NG/GT:20]  Out: 3500 [Other:3500]  Wt Readings from Last 5 Encounters:   06/29/24 132.3 kg (291 lb 10.7 oz)   06/25/24 (!) 147.9 kg (326 lb)   06/19/24 132.5 kg (292 lb 1.8 oz)   06/04/24 134.3 kg (296 lb)   05/27/24 134.3 kg (296 lb)     Body mass index is 45.68 kg/m².    Physical Exam  Constitutional:       General: Patient is not in acute distress.     Appearance: Patient is well-developed. She is not diaphoretic.   HENT:      Head: Normocephalic and atraumatic.      Mouth/Throat: Mucous membranes are moist.   Eyes:      General: No scleral icterus.     Pupils: Pupils are equal, round, and reactive to light.   Cardiovascular:      Rate and Rhythm: Normal rate and regular rhythm.   Pulmonary:      Effort: Pulmonary effort is normal. No respiratory distress.      Breath sounds: No stridor.   Abdominal:      General: There is no distension.      Palpations: Abdomen is soft.   Musculoskeletal:         General: No deformity. Normal range of motion.      Cervical back: Neck supple.   Skin:     General: Skin is warm and dry.      Findings: No rash present. No erythema.   Neurological:      Mental Status: Patient is alert and oriented to person, place, and time.      Cranial Nerves: No cranial nerve deficit.   Psychiatric:         Behavior: Behavior normal.          Patient care time was spent personally by me on the following activities: > 35 min    Obtaining a history.  Examination of patient.  Providing medical care at the patients bedside.  Developing a treatment plan with patient or surrogate and bedside caregivers.  Ordering and reviewing laboratory studies, radiographic studies, pulse oximetry.  Ordering and performing treatments and interventions.  Evaluation of patient's response to  treatment.  Discussions with consultants while on the unit and immediately available to the patient.  Re-evaluation of the patient's condition.  Documentation in the medical record.     Hector Blakely MD    [facility]  [address] [place] [phone] - tel  [phone] - fax    6/29/2024

## 2024-06-29 NOTE — PROGRESS NOTES
Formerly [facility], [facility]  Department of Cardiology  Progress Note    PATIENT NAME: Juhi Taylor  MRN: [id number]  TODAY'S DATE: 06/29/2024  ADMIT DATE: 6/18/2024    SUBJECTIVE     PRINCIPLE PROBLEM: Chronic kidney disease (CKD), stage V    INTERVAL HISTORY:    6/29/2024  Patient is alert and awake no specific complaints at the present time.  She is currently undergoing venous survey of the lower extremities.    Patient went into atrial fibrillation with rapid ventricular rate.    Her metoprolol tartrate was increased to 25 mg p.o. q. 6 hours and after giving metoprolol her heart rate continues to remain elevated.  Her white count is elevated at 17.29     Review of patient's allergies indicates:   Allergen Reactions    Percocet [oxycodone-acetaminophen] Itching    Amiodarone analogues      Itching      Irbesartan Swelling    Januvia [sitagliptin]     Jardiance [empagliflozin]      Leg cramps    Lipitor [atorvastatin] Other (See Comments)     Severe leg pain    Linaclotide Other (See Comments) and Nausea And Vomiting     Does not remember    Lubiprostone Other (See Comments) and Palpitations     Does not remember       REVIEW OF SYSTEMS  CARDIOVASCULAR: No recent chest pain, palpitations, arm, neck, or jaw pain  RESPIRATORY: No recent fever, cough chills, SOB or congestion  : No blood in the urine  GI: No Nausea, vomiting, constipation, diarrhea, blood, or reflux.  MUSCULOSKELETAL: No myalgias  NEURO: No lightheadedness or dizziness  EYES: No Double vision, blurry, vision or headache     OBJECTIVE     VITAL SIGNS (Most Recent)  Temp: 98.5 °F (36.9 °C) (06/29/24 0820)  Pulse: 110 (06/29/24 0820)  Resp: (!) 52 (06/29/24 0820)  BP: 115/73 (06/29/24 0800)  SpO2: 98 % (06/29/24 0820)    VENTILATION STATUS  Resp: (!) 52 (06/29/24 0820)  SpO2: 98 % (06/29/24 0820)           I & O (Last 24H):  Intake/Output Summary (Last 24 hours) at 6/29/2024 1153  Last data filed at 6/29/2024 0600  Gross per 24 hour   Intake 720 ml    Output 3500 ml   Net -2780 ml       WEIGHTS  Wt Readings from Last 1 Encounters:   06/29/24 0400 132.3 kg (291 lb 10.7 oz)   06/28/24 0400 133.4 kg (294 lb 1.5 oz)   06/22/24 0443 (!) 147.9 kg (326 lb 1 oz)   06/19/24 1927 (!) 147.3 kg (324 lb 11.8 oz)   06/18/24 1202 132.5 kg (292 lb)       PHYSICAL EXAM  CONSTITUTIONAL: Well built, well nourished in no apparent distress  NECK: no carotid bruit, no JVD  LUNGS:  Bilateral decreased breath sounds at the bases  CHEST WALL: no tenderness  HEART:  In atrial fibrillation.  With tachycardia, systolic murmur audible.   ABDOMEN: soft,   EXTREMITIES: Extremities normal, bilateral ankle edema  NEURO: AAO X 3    SCHEDULED MEDS:   epoetin alaina-epbx  50 Units/kg Intravenous Every Mon, Wed, Fri    famotidine  20 mg Oral Daily    guaiFENesin 100 mg/5 ml  400 mg Oral BID    heparin (porcine)  5,000 Units Subcutaneous Q8H    metoprolol tartrate  25 mg Oral BID       CONTINUOUS INFUSIONS:    PRN MEDS:  Current Facility-Administered Medications:     acetaminophen, 650 mg, Oral, Q8H PRN    aluminum-magnesium hydroxide-simethicone, 30 mL, Oral, QID PRN    dextrose 50%, 12.5 g, Intravenous, PRN    dextrose 50%, 25 g, Intravenous, PRN    glucagon (human recombinant), 1 mg, Intramuscular, PRN    glucose, 24 g, Oral, PRN    melatonin, 6 mg, Oral, Nightly PRN    ondansetron, 4 mg, Intravenous, Q6H PRN    LABS AND DIAGNOSTICS     CBC LAST 3 DAYS  Recent Labs   Lab 06/27/24  0310 06/27/24  0415 06/27/24  0539 06/28/24  0541 06/29/24  0158   WBC 10.33  --   --  14.22* 17.29*   RBC 4.22  --   --  4.16 4.43   HGB 10.7*  --   --  10.6* 11.3*   HCT 34.2*   < > 39 35.2* 38.5   MCV 81*  --   --  85 87   MCH 25.4*  --   --  25.5* 25.5*   MCHC 31.3*  --   --  30.1* 29.4*   RDW 19.2*  --   --  19.3* 20.1*   *  --   --  137* 159   MPV 9.6  --   --  9.9 11.3   GRAN 75.0*  7.8*  --   --  77.4*  11.0* 83.4*  14.4*   LYMPH 13.5*  1.4  --   --  10.6*  1.5 7.7*  1.3   MONO 8.2  0.9  --   --   "9.4  1.3* 7.4  1.3*   BASO 0.02  --   --  0.03 0.02   NRBC 1*  --   --  0 0    < > = values in this interval not displayed.       COAGULATION LAST 3 DAYS  Recent Labs   Lab 06/24/24  2016   INR 1.1       CHEMISTRY LAST 3 DAYS  Recent Labs   Lab 06/24/24  0543 06/24/24  1836 06/26/24  1322 06/27/24  0045 06/27/24  0310 06/27/24  0415 06/27/24  0539 06/28/24  0541 06/29/24  0058      < >  --  134*  --   --   --  137 137   K 5.4*   < >  --  4.1  --   --   --  3.5 3.8      < >  --  102  --   --   --  104 104   CO2 22*   < >  --  22*  --   --   --  25 23   ANIONGAP 8   < >  --  10  --   --   --  8 10   *   < >  --  41*  --   --   --  30* 24*   CREATININE 5.5*   < >  --  3.5*  --   --   --  3.2* 3.0*   *   < >  --  138*  --   --   --  91 121*   CALCIUM 9.0   < >  --  8.6*  --   --   --  8.6* 8.7   PHOS 6.0*  --   --  3.7  --   --   --   --  4.2   PH  --    < > 7.440  --   --  7.437 7.434  --   --    MG 2.6   < >  --  2.0 1.9  --   --  1.9 2.0   ALBUMIN  --    < >  --  3.3*  --   --   --  2.9* 3.1*   BILITOT  --    < >  --  1.0  --   --   --  1.0 1.1*   PROT  --    < >  --  6.0  --   --   --  5.7* 6.1   ALKPHOS  --    < >  --  90  --   --   --  86 96   ALT  --    < >  --  5*  --   --   --  6* 7*   AST  --    < >  --  25  --   --   --  17 17    < > = values in this interval not displayed.       CARDIAC PROFILE LAST 3 DAYS  Recent Labs   Lab 06/25/24  1308 06/26/24  0302 06/27/24  0045   CPK  --   --  267*   TROPONINIHS 76.1* 47.2* 52.4*       ENDOCRINE LAST 3 DAYS  No results for input(s): "TSH", "PROCAL" in the last 168 hours.    LAST ARTERIAL BLOOD GAS  ABG  Recent Labs   Lab 06/27/24  0539   PH 7.434   PO2 78*   PCO2 34.3*   HCO3 23.0*   BE -1       LAST 7 DAYS MICROBIOLOGY   Microbiology Results (last 7 days)       Procedure Component Value Units Date/Time    Blood culture [[id number]] Collected: 06/24/24 2016    Order Status: Completed Specimen: Blood from Peripheral, Hand, Right Updated: " 06/28/24 2232     Blood Culture, Routine No Growth to date      No Growth to date      No Growth to date      No Growth to date      No Growth to date    Culture, Respiratory with Gram Stain [[id number]] Collected: 06/24/24 1848    Order Status: Completed Specimen: Respiratory from Sputum, Expectorated Updated: 06/26/24 0918     Respiratory Culture No normal respiratory lesley     Gram Stain (Respiratory) <10 epithelial cells per low power field.     Gram Stain (Respiratory) Few WBC's     Gram Stain (Respiratory) No organisms seen            MOST RECENT IMAGING  X-Ray Chest AP Portable  Narrative: EXAMINATION:  XR CHEST AP PORTABLE    CLINICAL HISTORY:  Ventilator;    FINDINGS:  Portable chest at 04:32 is compared to prior study dated 06/27/2024 shows cardiomegaly.  The left subclavian vein dual port catheter is in stable position.    There is improved aeration of the lungs with improvement of the vascular congestion.  There are tiny pleural effusions.  There is no pneumothorax.  There are no acute osseous abnormalities.  Impression: Moderate cardiomegaly with improvement of the pulmonary congestion    Tiny pleural effusions    Electronically signed by: Katie Cordero  Date:    06/28/2024  Time:    07:02      [facility]  Results for orders placed during the hospital encounter of 06/18/24    Echo    Interpretation Summary    Left Ventricle: The left ventricle is normal in size. Normal wall thickness. Severe global hypokinesis present. There is severely reduced systolic function with a visually estimated ejection fraction of 20 - 25%. Grade III diastolic dysfunction.    Right Ventricle: Mild right ventricular enlargement. Wall thickness is normal. Right ventricle wall motion has global hypokinesis. Systolic function is moderately reduced.    Left Atrium: Left atrium is moderately dilated.    Tricuspid Valve: There is moderate regurgitation with an anteromedial eccentrically directed jet.    Overall the study quality was  technically difficult. The study was difficult due to patient's clinical status and body habitus.      CURRENT/PREVIOUS VISIT EKG  Results for orders placed or performed during the hospital encounter of 06/18/24   EKG 12-lead    Collection Time: 06/26/24  1:46 AM   Result Value Ref Range    QRS Duration 136 ms    [facility] QTC Calculation 482 ms    Narrative    Test Reason : R00.1,    Vent. Rate : 055 BPM     Atrial Rate : 055 BPM     P-R Int : 154 ms          QRS Dur : 136 ms      QT Int : 504 ms       P-R-T Axes : 034 -11 189 degrees     QTc Int : 482 ms    Sinus bradycardia with occasional Premature ventricular complexes  Right bundle branch block  LVH with repolarization abnormality  Possible Lateral infarct ,age undetermined  Abnormal ECG  When compared with ECG of 25-JUN-2024 00:30,  Right bundle branch block is now Present    Referred By: MICHELLE   SELF           Confirmed By:        ASSESSMENT/PLAN:     Active Hospital Problems    Diagnosis    *Chronic kidney disease (CKD), stage V    Cardiac arrest    CHF (congestive heart failure)    Acute hypoxic respiratory failure    Morbid obesity    Renal cell carcinoma of right kidney    Paroxysmal atrial fibrillation    Type 2 diabetes mellitus with hyperglycemia    Coronary artery disease    Essential hypertension       ASSESSMENT & PLAN:   Cardiac arrest  Ventricular tachycardia  Left bundle branch block  Cardiomyopathy  Acute hypoxic respiratory failure  CAD  H/O PCI x3 in 2020  Hypertension  Congestive heart failure  Paroxysmal atrial fibrillation  Diabetes mellitus 2  CKD stage 5/ESRD  Morbid Obesity  Renal cell carcinoma of right kidney -right nephrectomy  Thrombocytopenia      RECOMMENDATIONS:  1. Cardiac arrest/VT/left bundle branch block  Patient has not had any episodes of arrhythmias other than atrial fibrillation.  2. Atrial fibrillation   Currently on heparin 5000 units subQ q.8 hours.  There is a mild lose around the dialysis catheter.  3. Patient is in  atrial fibrillation with elevated heart rate and relative hypotension concerned for sepsis and also there is an increased requirement for oxygen.  Workup for DVT PE.  4. Hypokalemia and hypomagnesemia can be a adjusted in the dialysate.  However she would benefit from receiving 20 mEq of potassium.          -Patient is status post PEA/V-tach arrest on 06/24/2024  -Pressors have been weaned off and she was extubated today  -Echocardiogram showed drop in EF to 20-25% (reduced EF since 2021)  -Left bundle-branch block noted on EKG.  -Pt will need ICD implanted once recovers from pulmonary standpoint.   - Patient isn't sure if she wants to do angiogram at this point  -Platelets continue to improve daily, Monitor CBC daily  -Recommend start Eliquis 2.5 mg b.i.d. for anticoagulation in presence of PAF.  Patient states she does not want to do this nor start Plavix.       -Rhythm has been sinus rhythm with PVCs, no recurrent VT.    Started Lopressor yesterday, continue Lopressor to 12.5 mg TID   -Family reports patient had bad experience w/ amiodarone previously.  Amiodarone is listed as an allergy on her chart   1. Discussed with patient again at length and in detail.  And patient refuses amiodarone.  And at the present time she also does not want cardiac catheterization.  And she might consider sometime later.  2. She is tolerating metoprolol tartrate well continue the same.  3. Need to consider anticoagulation with heparin as she is at risk for DVT  Recommend heparin 5000 units subQ q.8 hours.  And 1st dose now.    -Closely monitor electrolytes daily. K+ is 3.5 & Magnesium 1.9 today   Patient has been extubated and she does say that she does understand.   Patient is refusing amiodarone and apparently she had refused amiodarone in the past.    Patient is tolerating metoprolol tartrate 12.5 mg p.o. b.I.d. will increase it to t.I.d. dosing today and her ectopy has decreased significantly.  Discussed with patient's daughters  and patient in regards with cardiac catheterization and coronary angiography and patient is refusing for cardiac catheterization and coronary angiography.   Patient's platelets have improved her current platelets a 122 and consideration for starting her on anticoagulation with Eliquis but patient is refusing Eliquis.  And she is also refusing anticoagulation with heparin.   Consideration for Plavix 75 mg p.o. daily.   Discussed with patient's daughters who were present in the room in regards with her current management.  Dennis Trejo MD  Date of Service: 06/29/2024  11:53 AM

## 2024-06-30 LAB
ANION GAP SERPL CALC-SCNC: 11 MMOL/L (ref 8–16)
BASOPHILS # BLD AUTO: 0.03 K/UL (ref 0–0.2)
BASOPHILS NFR BLD: 0.2 % (ref 0–1.9)
BUN SERPL-MCNC: 41 MG/DL (ref 8–23)
CALCIUM SERPL-MCNC: 8.9 MG/DL (ref 8.7–10.5)
CHLORIDE SERPL-SCNC: 104 MMOL/L (ref 95–110)
CO2 SERPL-SCNC: 21 MMOL/L (ref 23–29)
CREAT SERPL-MCNC: 4.3 MG/DL (ref 0.5–1.4)
DIFFERENTIAL METHOD BLD: ABNORMAL
EOSINOPHIL # BLD AUTO: 0.2 K/UL (ref 0–0.5)
EOSINOPHIL NFR BLD: 1.4 % (ref 0–8)
ERYTHROCYTE [DISTWIDTH] IN BLOOD BY AUTOMATED COUNT: 20.1 % (ref 11.5–14.5)
EST. GFR  (NO RACE VARIABLE): 10.4 ML/MIN/1.73 M^2
GLUCOSE SERPL-MCNC: 120 MG/DL (ref 70–110)
GLUCOSE SERPL-MCNC: 120 MG/DL (ref 70–110)
GLUCOSE SERPL-MCNC: 133 MG/DL (ref 70–110)
HBV SURFACE AG SERPL QL IA: NEGATIVE
HCT VFR BLD AUTO: 36.8 % (ref 37–48.5)
HGB BLD-MCNC: 10.6 G/DL (ref 12–16)
IMM GRANULOCYTES # BLD AUTO: 0.13 K/UL (ref 0–0.04)
IMM GRANULOCYTES NFR BLD AUTO: 0.9 % (ref 0–0.5)
LYMPHOCYTES # BLD AUTO: 2 K/UL (ref 1–4.8)
LYMPHOCYTES NFR BLD: 12.8 % (ref 18–48)
MAGNESIUM SERPL-MCNC: 2.2 MG/DL (ref 1.6–2.6)
MCH RBC QN AUTO: 25.1 PG (ref 27–31)
MCHC RBC AUTO-ENTMCNC: 28.8 G/DL (ref 32–36)
MCV RBC AUTO: 87 FL (ref 82–98)
MONOCYTES # BLD AUTO: 1.5 K/UL (ref 0.3–1)
MONOCYTES NFR BLD: 9.5 % (ref 4–15)
NEUTROPHILS # BLD AUTO: 11.5 K/UL (ref 1.8–7.7)
NEUTROPHILS NFR BLD: 75.2 % (ref 38–73)
NRBC BLD-RTO: 0 /100 WBC
PLATELET # BLD AUTO: 196 K/UL (ref 150–450)
PMV BLD AUTO: 11.2 FL (ref 9.2–12.9)
POTASSIUM SERPL-SCNC: 4.4 MMOL/L (ref 3.5–5.1)
RBC # BLD AUTO: 4.22 M/UL (ref 4–5.4)
SODIUM SERPL-SCNC: 136 MMOL/L (ref 136–145)
WBC # BLD AUTO: 15.29 K/UL (ref 3.9–12.7)

## 2024-06-30 PROCEDURE — 93005 ELECTROCARDIOGRAM TRACING: CPT | Performed by: GENERAL PRACTICE

## 2024-06-30 PROCEDURE — 25000003 PHARM REV CODE 250: Performed by: INTERNAL MEDICINE

## 2024-06-30 PROCEDURE — 85025 COMPLETE CBC W/AUTO DIFF WBC: CPT | Performed by: INTERNAL MEDICINE

## 2024-06-30 PROCEDURE — 99233 SBSQ HOSP IP/OBS HIGH 50: CPT | Mod: ,,, | Performed by: INTERNAL MEDICINE

## 2024-06-30 PROCEDURE — 97530 THERAPEUTIC ACTIVITIES: CPT

## 2024-06-30 PROCEDURE — 99900031 HC PATIENT EDUCATION (STAT)

## 2024-06-30 PROCEDURE — 93010 ELECTROCARDIOGRAM REPORT: CPT | Mod: ,,, | Performed by: GENERAL PRACTICE

## 2024-06-30 PROCEDURE — 36415 COLL VENOUS BLD VENIPUNCTURE: CPT | Performed by: INTERNAL MEDICINE

## 2024-06-30 PROCEDURE — 94761 N-INVAS EAR/PLS OXIMETRY MLT: CPT

## 2024-06-30 PROCEDURE — 63600175 PHARM REV CODE 636 W HCPCS: Performed by: INTERNAL MEDICINE

## 2024-06-30 PROCEDURE — 83735 ASSAY OF MAGNESIUM: CPT | Performed by: INTERNAL MEDICINE

## 2024-06-30 PROCEDURE — 82962 GLUCOSE BLOOD TEST: CPT

## 2024-06-30 PROCEDURE — 21000000 HC CCU ICU ROOM CHARGE

## 2024-06-30 PROCEDURE — 27000221 HC OXYGEN, UP TO 24 HOURS

## 2024-06-30 PROCEDURE — 80048 BASIC METABOLIC PNL TOTAL CA: CPT | Performed by: INTERNAL MEDICINE

## 2024-06-30 RX ORDER — FUROSEMIDE 10 MG/ML
40 INJECTION INTRAMUSCULAR; INTRAVENOUS EVERY 12 HOURS
Status: DISCONTINUED | OUTPATIENT
Start: 2024-06-30 | End: 2024-07-03 | Stop reason: HOSPADM

## 2024-06-30 RX ORDER — SOTALOL HYDROCHLORIDE 80 MG/1
40 TABLET ORAL 2 TIMES DAILY
Status: DISCONTINUED | OUTPATIENT
Start: 2024-06-30 | End: 2024-07-01

## 2024-06-30 RX ADMIN — FUROSEMIDE 40 MG: 10 INJECTION, SOLUTION INTRAVENOUS at 09:06

## 2024-06-30 RX ADMIN — FAMOTIDINE 20 MG: 20 TABLET ORAL at 08:06

## 2024-06-30 RX ADMIN — METOPROLOL TARTRATE 50 MG: 50 TABLET, FILM COATED ORAL at 08:06

## 2024-06-30 RX ADMIN — CEFEPIME 2 G: 2 INJECTION, POWDER, FOR SOLUTION INTRAVENOUS at 09:06

## 2024-06-30 RX ADMIN — HEPARIN SODIUM 5000 UNITS: 5000 INJECTION, SOLUTION INTRAVENOUS; SUBCUTANEOUS at 06:06

## 2024-06-30 RX ADMIN — FUROSEMIDE 40 MG: 10 INJECTION, SOLUTION INTRAVENOUS at 11:06

## 2024-06-30 RX ADMIN — HEPARIN SODIUM 5000 UNITS: 5000 INJECTION, SOLUTION INTRAVENOUS; SUBCUTANEOUS at 09:06

## 2024-06-30 RX ADMIN — GUAIFENESIN 400 MG: 200 SOLUTION ORAL at 08:06

## 2024-06-30 RX ADMIN — SOTALOL HYDROCHLORIDE 40 MG: 80 TABLET ORAL at 09:06

## 2024-06-30 RX ADMIN — HEPARIN SODIUM 5000 UNITS: 5000 INJECTION, SOLUTION INTRAVENOUS; SUBCUTANEOUS at 02:06

## 2024-06-30 NOTE — ASSESSMENT & PLAN NOTE
Patient had PD cath placed 2 weeks ago however due to insurance situation never received dialysis  Tunneled dialysis catheter was placed 6/22  Revision 6/23  Patient has persistent oozing from dialysis catheter  and DDAVP given and now stopped   HD as per nephrology   Tunnel catheter needed to remove because of possible infection . Cultures neg

## 2024-06-30 NOTE — PT/OT/SLP PROGRESS
"Physical Therapy Treatment    Patient Name:  Juhi Taylor   MRN:  [id number]    Recommendations:     Discharge Recommendations: High Intensity Therapy  Discharge Equipment Recommendations: wheelchair  Barriers to discharge:  increased burden of care    Assessment:     Juhi Taylor is a 74 y.o. female admitted with a medical diagnosis of Chronic kidney disease (CKD), stage V.  She presents with the following impairments/functional limitations: weakness, impaired endurance, gait instability, decreased lower extremity function, impaired functional mobility, visual deficits   Patient is motivated. Morbidly obese and has difficulty standing erect when up. Risk for fall from low endurance and marked debility.    Rehab Prognosis: Fair; patient would benefit from acute skilled PT services to address these deficits and reach maximum level of function.    Recent Surgery: Procedure(s) (LRB):  Insertion,catheter (N/A) 7 Days Post-Op    Plan:     During this hospitalization, patient to be seen 6 x/week to address the identified rehab impairments via gait training, therapeutic activities, therapeutic exercises and progress toward the following goals:    Plan of Care Expires:  07/28/24    Subjective     Chief Complaint: I'm tired of being confined in the bed.  Patient/Family Comments/goals: "I'll have a long soak in the tub when I get out of here.'  Pain/Comfort:  Pain Rating 1: 0/10      Objective:     Communicated with nurse Ninfa prior to session.  Patient found sitting edge of bed with telemetry, oxygen, pulse ox (continuous), peripheral IV upon PT entry to room.     General Precautions: Standard, fall  Orthopedic Precautions: N/A  Braces: N/A  Respiratory Status: Nasal cannula, flow 2 L/min     Functional Mobility:  Transfers:     Sit to Stand:  maximal assistance with rolling walker  Bed to Chair: maximal assistance with  rolling walker  using  Stand Pivot  Gait: Unable  Balance: Good sitting; No " standing    Treatment & Education:  Patient seen for Exercises instruction, pre-gait activity, transfers and toiletting. Instructed on ankle pumps and terminal knee extension while seated. Sit to stand using RW x 4 with momentary weight bearing as tolerated. Unable to stand erect or sustain > 20 seconds standing. Stand pivot transfers from  bed to commode and back is almost squat pivot except knees are extended and feet changes position initiating  steps. Requires maximum assist to facilitate momentum with sit to stand.    Patient left sitting edge of bed with  nurse & sister present..    GOALS:   Multidisciplinary Problems       Physical Therapy Goals          Problem: Physical Therapy    Goal Priority Disciplines Outcome Goal Variances Interventions   Physical Therapy Goal     PT, PT/OT Progressing     Description: Goals to be met by: 24     Patient will increase functional independence with mobility by performin. Supine to sit with Supervision  2. Sit to stand transfer with Supervision  3. Bed to chair transfer with Supervision using Rolling Walker  4. Gait  x 150 feet with Supervision using Rolling Walker.                              Time Tracking:     PT Received On: 24  PT Start Time: 0940     PT Stop Time: 1010  PT Total Time (min): 30 min     Billable Minutes: Therapeutic Activity 30    Treatment Type: Treatment  PT/PTA: PT     Number of PTA visits since last PT visit: 0     2024

## 2024-06-30 NOTE — PROGRESS NOTES
Nephrology Progress Note        Patient Name: Juhi Taylor  MRN: [id number]    Patient Class: IP- Inpatient   Admission Date: 6/18/2024  Length of Stay: 11 days  Date of Service: 6/30/2024    Attending Physician: Manuel Christensen MD  Primary Care Provider: Tony Sadler MD    Reason for Consult: josue    SUBJECTIVE:     HPI: 73F fallowed with Dr. Martinez from nephrology for advanced CKD stage 4-5 getting admitted with fluid overload. Pt had PD catheter insertion 2 weeks ago in preparation for initiation of dialysis, however had post-operative issues with increased leakage. Then her PD in initiation and training was delayed due to insurance issues. She has oliguria/increased weight gain in past 2 weeks, SOB on exertion and cough. Symptoms got worse and she came to ER and got admitted.     I saw her in the ER and recommend trial of lasix gtt, cassia. Will consult Dr. Catalan to evaluate patient and make recommendation for catheter care - we probably will not be able to use it for now for dialysis.    6/19 VSS. Only 175cc UO documented, but may be inaccurately charted - pt boarding in ER. Will increase lasix gtt to 10mg/hr and ensure at least q shift UO documentation.    6/20 VSS. BP low, stop Clonidine patch. Increase lasix gtt to 15mg/hr, add metolazone. Stop Fluconazole in 1-2 days after stopping IV abx for suspected PNA. Appreciate Surgery input - will d/w patient initiation of HD tomorrow if unable to produce much UO and symptomatic improvement...    6/21 VSS. Will discuss initiation of dialysis via HD, she is not responding to diuretics.    6/22 VSS. Plan for HD today after dialysis access placement. Seen on HD, BF around 275 cc/min max, otherwise tolerating well. Will have to be repositioned in cath lab by Dr. Griffin on Monday.    6/23 VSS. Getting HD cath repositioned today, plan HD tomorrow.    6/24 VSS, on 2-3L NC, first HD 6/22, second HD today    6/25 105 min into HD treatment,  patient became lethargic acutely (was conversing/calm)- gave NS bolus- went into PEA arrest- had ACLS with ROSC- net UF 750cc, intubated and transferred to ICU, bradycardic, SBP 110s, FIO2 35%, UOP 100cc, required amio and levophed but now off- mld rise in trop noted, lactic acid normal, CXR clear, echo pending    6/26 tolerated HD yest- got off 2L- echo with EF 20-25% with grade III DD which is much worse compared to < 1 week ago with EF 40-45% suggesting cardiac event- trop peaked- per cards plan for life vest and may need LHC- HR 50s, -160s, FIO2 30%, UOP 550cc  - seen on HD  - bleeding at TDC exit site is better    6/27 tolerated HD well yest- got off 3L- -160, FIO2 30%, UOP 400cc    6/28 tolerated HD with restraints yest- got off 4L, SBP > 150, on 2L NC, ordered 4L UF- niece who is a dialysis nurse asked that we only take off 3L, UOP 245cc  - seen on HD  - conversant, calm- still very edematous    6/29 elevated HR (AF), SBP stable, on 4L NC, no UOP, WBC is going up, CXR yest with improvement in pulm edema, some blisters on her arm getting wound care (infiltrated IV)- ? Infx  source- sitting in chair- lung movement better, c/w edema, more alert today    6/30 TDC with pus collection around area of tunneling- this explains source of infection- truly appreciate Dr. Brunson coming out last night to remove access- remains in AF with RVR- SBP < 140, on 2-4L NC, looks much better today, sitting at edge of bed, more alert, more conversant, asking appropriate questions and giving appropriate responses, still a little confused at times about course of her hospitalization but overall much improved mental status wise    ASSESSMENT/PLAN:     Sepsis due to infected TDC- access removed 6/29  - initiated vanc and cefepime on 6/29  - 6/29 blood cx from peripheral and TDC tip so far negative  - leukocytosis is improving  - other workup- LE duplex neg for DVT, getting wound care for blisters on R arm where IV  infiltrated     CKD stage 5- uremia, hyperkalemia, oliguria  PD cathether in place  Initiated on RRT via IHD this admission using TDC (6/22)  - last HD 6/28  - held HD over weekend due to sepsis requiring TDC removal 6/29  - hopeful we can plan for another TDC Monday or Tuesday depending on cultures  - renal diet, 1.5L fluid restriction  - will need outpatient HD unit placement established prior to discharge  - dose meds for CrCl < 10/HD    PEA/VT arrest during dialysis 6/24- EF 20-25% (new)  AF with RVR  - echo shows newly depressed EF supportive of a cardiac event- could have been precipitated by myocardial stress during dialysis- don't feel that trop rise is c/w an acute NSTEMI however  - patient has declined LHC - patient is declining plavix- plan for life vest and eventually AICD which will probably need to happen at [facility]  - titrating beta blocker for AF with RVR- patient is declining amiodarone and eliquis- she has agreed to heparin SQ    Baseline HTN  Volume overload/S CHF exacerbation  Hyponatremia  - has been improving with daily UF last week  - since we don't have dialysis access right now, will start lasix 40mg IV BID as she is urinating some    HypoK/HypoMg  - will supplement PRN to prevent cardiac disturbance    MBD / Secondary HPT  - phos is at goal- continue renal based nutrition  - last PTH acceptable    Anemia of CKD  Thrombocytopenia  - Hb stable 10-11- continue PONCE with HD  - plat normalized     Thank you for allowing us to participate in the care of your patient!   We will follow the patient and provide recommendations as needed.         Laboratory:  Recent Labs   Lab 06/27/24  0045 06/28/24  0541 06/29/24  0058   * 137 137   K 4.1 3.5 3.8    104 104   CO2 22* 25 23   BUN 41* 30* 24*   CREATININE 3.5* 3.2* 3.0*   * 91 121*       Recent Labs   Lab 06/24/24  0543 06/24/24 2016 06/27/24  0045 06/27/24  0310 06/28/24  0541 06/29/24  0058 06/30/24  0320   CALCIUM 9.0   < >  "8.6*  --  8.6* 8.7  --    ALBUMIN  --    < > 3.3*  --  2.9* 3.1*  --    PHOS 6.0*  --  3.7  --   --  4.2  --    MG 2.6   < > 2.0   < > 1.9 2.0 2.2    < > = values in this interval not displayed.       Recent Labs   Lab 10/10/23  1113 01/22/24  1133 05/08/24  1139   PTH, Intact 583.1 H 506.7 H 291.7 H       No results for input(s): "POCTGLUCOSE" in the last 168 hours.    Recent Labs   Lab 07/05/23  1533 01/22/24  1133 06/19/24  0443   Hemoglobin A1C 5.6 5.4 6.8 H       Recent Labs   Lab 06/28/24  0541 06/29/24  0158 06/30/24  0320   WBC 14.22* 17.29* 15.29*   HGB 10.6* 11.3* 10.6*   HCT 35.2* 38.5 36.8*   * 159 196   MCV 85 87 87   MCHC 30.1* 29.4* 28.8*   MONO 9.4  1.3* 7.4  1.3* 9.5  1.5*   EOSINOPHIL 1.7 0.9 1.4       Recent Labs   Lab 06/27/24  0045 06/28/24  0541 06/29/24  0058   BILITOT 1.0 1.0 1.1*   BILIDIR 0.4*  --   --    PROT 6.0 5.7* 6.1   ALBUMIN 3.3* 2.9* 3.1*   ALKPHOS 90 86 96   ALT 5* 6* 7*   AST 25 17 17       Recent Labs   Lab 03/17/22  2240 04/07/22  1536 04/17/23  1448 07/14/23 2026 03/14/24  2254 05/09/24  1029 06/23/24  1816   Color, UA Colorless A   < > Colorless A  --  Yellow Yellow  --    Appearance, UA Clear   < > Clear  --  Clear Clear  --    pH, UA 5.0   < > 6.0  --  6.0 6.0  --    Specific [place], UA 1.010   < > 1.010  --  1.010 1.015  --    Protein, UA Negative   < > 1+ A  --  1+ A 1+ A  --    Glucose, UA Negative   < > Negative  --  Negative Negative  --    Ketones, UA Negative   < > Negative  --  Negative Negative  --    Urobilinogen, UA Negative  --   --   --  Negative  --   --    Bilirubin (UA) Negative   < > Negative  --  Negative Negative  --    Occult Blood UA Negative   < > Negative  --  TRACE Negative  --    Nitrite, UA Negative   < > Negative  --  Negative Negative  --    RBC, UA  --    < > 3   < > 3 1 >100 H   WBC, UA  --    < > 3   < > 5 1 >100 H   Bacteria  --    < > None   < > None Rare Many A   Hyaline Casts, UA  --    < > 0  --  0 0  --     < > = values in " this interval not displayed.       Recent Labs   Lab 06/26/24  1322 06/27/24  0415 06/27/24  0539   POC PH 7.440 7.437 7.434   POC PCO2 39.0 35.8 34.3 L   POC HCO3 26.5 24.1 23.0 L   POC PO2 95 72 L 78 L   POC SATURATED O2 98 95 96   POC BE 2 0 -1   Sample ARTERIAL ARTERIAL ARTERIAL       Microbiology Results (last 7 days)       Procedure Component Value Units Date/Time    IV catheter culture [[id number]] Collected: 06/29/24 2255    Order Status: Completed Specimen: Catheter Tip, Subclavian Updated: 06/30/24 0825     Aerobic Culture - Cath tip No growth    Narrative:      Left subclavian HD catheter    Blood culture [[id number]] Collected: 06/29/24 1607    Order Status: Completed Specimen: Blood Updated: 06/29/24 2317     Blood Culture, Routine No Growth to date    Blood culture [[id number]] Collected: 06/29/24 1607    Order Status: Completed Specimen: Blood Updated: 06/29/24 2317     Blood Culture, Routine No Growth to date    Blood culture [[id number]] Collected: 06/24/24 2016    Order Status: Completed Specimen: Blood from Peripheral, Hand, Right Updated: 06/29/24 2232     Blood Culture, Routine No growth after 5 days.    Culture, Respiratory with Gram Stain [[id number]] Collected: 06/24/24 1848    Order Status: Completed Specimen: Respiratory from Sputum, Expectorated Updated: 06/26/24 0918     Respiratory Culture No normal respiratory lesley     Gram Stain (Respiratory) <10 epithelial cells per low power field.     Gram Stain (Respiratory) Few WBC's     Gram Stain (Respiratory) No organisms seen            Review of patient's allergies indicates:   Allergen Reactions    Percocet [oxycodone-acetaminophen] Itching    Amiodarone analogues      Itching      Irbesartan Swelling    Januvia [sitagliptin]     Jardiance [empagliflozin]      Leg cramps    Lipitor [atorvastatin] Other (See Comments)     Severe leg pain    Linaclotide Other (See Comments) and Nausea And Vomiting     Does not remember    Lubiprostone Other  (See Comments) and Palpitations     Does not remember       Outpatient meds:  No current facility-administered medications on file prior to encounter.     Current Outpatient Medications on File Prior to Encounter   Medication Sig Dispense Refill    allopurinoL (ZYLOPRIM) 300 MG tablet Take 1 tablet (300 mg total) by mouth once daily. 90 tablet 3    amLODIPine (NORVASC) 10 MG tablet Take 10 mg by mouth once daily.      calcitRIOL (ROCALTROL) 0.5 MCG Cap Take 0.5 mcg by mouth once daily.      carvediloL (COREG) 25 MG tablet Take 1 tablet (25 mg total) by mouth 2 (two) times daily. 180 tablet 2    cyanocobalamin (VITAMIN B-12) 100 MCG tablet Take 100 mcg by mouth once daily.      evolocumab (REPATHA SURECLICK) 140 mg/mL PnIj Inject 1 mL (140 mg total) into the skin every 14 (fourteen) days. 2 mL 11    furosemide (LASIX) 40 MG tablet Take 40 mg by mouth once daily.      loratadine (CLARITIN) 10 mg tablet Take 10 mg by mouth once daily.      magnesium oxide (MAG-OX) 400 mg (241.3 mg magnesium) tablet Take 1 tablet (400 mg total) by mouth daily as needed (cramping). 30 tablet 0    blood sugar diagnostic (TRUE METRIX GLUCOSE TEST STRIP) Strp Use 1x daily. Insurance preferred. 100 strip 3    blood sugar diagnostic Strp To check BG 1 time daily, to use with insurance preferred meter 100 strip 3    cloNIDine (CATAPRES) 0.1 MG tablet Take 1 tablet (0.1 mg total) by mouth 3 (three) times daily as needed (PRN SBP > 165 mmHg). 90 tablet 6    cloNIDine 0.1 mg/24 hr td ptwk (CATAPRES) 0.1 mg/24 hr Place 1 patch onto the skin every 7 days. 4 patch 4    lancets Misc To check BG 1 times daily, to use with insurance preferred meter 100 each 3    nitroGLYCERIN (NITROSTAT) 0.4 MG SL tablet Place 1 tablet (0.4 mg total) under the tongue every 5 (five) minutes as needed for Chest pain. 25 tablet 0    [DISCONTINUED] aspirin (ECOTRIN) 81 MG EC tablet Take 1 tablet (81 mg total) by mouth once daily. 90 tablet 3    [DISCONTINUED] DULoxetine  (CYMBALTA) 20 MG capsule TAKE ONE CAPSULE BY MOUTH ONCE DAILY 30 capsule 4    [DISCONTINUED] folic acid (FOLVITE) 1 MG tablet Take 1 mg by mouth once daily.      [DISCONTINUED] metFORMIN (GLUCOPHAGE-XR) 500 MG ER 24hr tablet Take 2 tablets (1,000 mg total) by mouth 2 (two) times daily with meals. 360 tablet 3    [DISCONTINUED] sodium bicarbonate 650 MG tablet Take 1 tablet (650 mg total) by mouth once daily. 30 tablet 11       Scheduled meds:   ceFEPime IV (PEDS and ADULTS)  1 g Intravenous Q24H    epoetin alaina-epbx  50 Units/kg Intravenous Every Mon, Wed, Fri    famotidine  20 mg Oral Daily    guaiFENesin 100 mg/5 ml  400 mg Oral BID    heparin (porcine)  5,000 Units Subcutaneous Q8H    metoprolol tartrate  50 mg Oral BID       Infusions:          PRN meds:    Current Facility-Administered Medications:     acetaminophen, 650 mg, Oral, Q8H PRN    aluminum-magnesium hydroxide-simethicone, 30 mL, Oral, QID PRN    dextrose 50%, 12.5 g, Intravenous, PRN    dextrose 50%, 25 g, Intravenous, PRN    glucagon (human recombinant), 1 mg, Intramuscular, PRN    glucose, 24 g, Oral, PRN    melatonin, 6 mg, Oral, Nightly PRN    ondansetron, 4 mg, Intravenous, Q6H PRN    Pharmacy to dose Vancomycin consult, , , Once **AND** vancomycin - pharmacy to dose, , Intravenous, pharmacy to manage frequency      OBJECTIVE:     Vital Signs and IO:  Temp:  [97.6 °F (36.4 °C)-98.3 °F (36.8 °C)]   Pulse:  [100-130]   Resp:  [21-59]   BP: (100-142)/(58-87)   SpO2:  [90 %-100 %]   I/O last 3 completed shifts:  In: 970 [P.O.:920; I.V.:50]  Out: 0   Wt Readings from Last 5 Encounters:   06/29/24 132.3 kg (291 lb 10.7 oz)   06/25/24 (!) 147.9 kg (326 lb)   06/19/24 132.5 kg (292 lb 1.8 oz)   06/04/24 134.3 kg (296 lb)   05/27/24 134.3 kg (296 lb)     Body mass index is 45.68 kg/m².    Physical Exam  Constitutional:       General: Patient is not in acute distress.     Appearance: Patient is well-developed. She is not diaphoretic.   HENT:      Head:  Normocephalic and atraumatic.      Mouth/Throat: Mucous membranes are moist.   Eyes:      General: No scleral icterus.     Pupils: Pupils are equal, round, and reactive to light.   Cardiovascular:      Rate and Rhythm: Normal rate and regular rhythm.   Pulmonary:      Effort: Pulmonary effort is normal. No respiratory distress.      Breath sounds: No stridor.   Abdominal:      General: There is no distension.      Palpations: Abdomen is soft.   Musculoskeletal:         General: No deformity. Normal range of motion.      Cervical back: Neck supple.   Skin:     General: Skin is warm and dry.      Findings: No rash present. No erythema.   Neurological:      Mental Status: Patient is alert and oriented to person, place, and time.      Cranial Nerves: No cranial nerve deficit.   Psychiatric:         Behavior: Behavior normal.          Patient care time was spent personally by me on the following activities: > 35 min    Obtaining a history.  Examination of patient.  Providing medical care at the patients bedside.  Developing a treatment plan with patient or surrogate and bedside caregivers.  Ordering and reviewing laboratory studies, radiographic studies, pulse oximetry.  Ordering and performing treatments and interventions.  Evaluation of patient's response to treatment.  Discussions with consultants while on the unit and immediately available to the patient.  Re-evaluation of the patient's condition.  Documentation in the medical record.     Hector Blakely MD    [facility]  [address] [place] [address]  [place] [address]    [phone] - tel  [phone] - fax    6/30/2024

## 2024-06-30 NOTE — ASSESSMENT & PLAN NOTE
Rhythm PEA. Initially bradycardiac  now A fib with RVR  Possible cardiac cause . Cardiology on the case   Cannot give blood thinners initially. Oozing from tunnel catheter  and thrombocytopenia  Severe HF with EF 20% . Need life vest at DC. AICD as OP   Cardiology follow up

## 2024-06-30 NOTE — PLAN OF CARE
Problem: Physical Therapy  Goal: Physical Therapy Goal  Description: Goals to be met by: 24     Patient will increase functional independence with mobility by performin. Supine to sit with Supervision  2. Sit to stand transfer with Supervision  3. Bed to chair transfer with Supervision using Rolling Walker  4. Gait  x 150 feet with Supervision using Rolling Walker.         Outcome: Progressing   Patient seen for progressivel mobility to facilitate improved function.

## 2024-06-30 NOTE — ASSESSMENT & PLAN NOTE
Chronic, controlled. Latest blood pressure and vitals reviewed-     Temp:  [97.8 °F (36.6 °C)-98.3 °F (36.8 °C)]   Pulse:  []   Resp:  [21-38]   BP: (100-142)/(58-87)   SpO2:  [90 %-100 %] .   Home meds for hypertension were reviewed and noted below.   Hypertension Medications               carvediloL (COREG) 25 MG tablet Take 1 tablet (25 mg total) by mouth 2 (two) times daily.    cloNIDine (CATAPRES) 0.1 MG tablet Take 1 tablet (0.1 mg total) by mouth 3 (three) times daily as needed (PRN SBP > 165 mmHg).    cloNIDine 0.1 mg/24 hr td ptwk (CATAPRES) 0.1 mg/24 hr Place 1 patch onto the skin every 7 days.    furosemide (LASIX) 40 MG tablet Take 40 mg by mouth once daily.    amLODIPine (NORVASC) 10 MG tablet Take 10 mg by mouth once daily.    nitroGLYCERIN (NITROSTAT) 0.4 MG SL tablet Place 1 tablet (0.4 mg total) under the tongue every 5 (five) minutes as needed for Chest pain.            While in the hospital, will manage blood pressure as follows; Continue home antihypertensive regimen    Will utilize p.r.n. blood pressure medication only if patient's blood pressure greater than 140/90 and she develops symptoms such as worsening chest pain or shortness of breath.

## 2024-06-30 NOTE — NURSING
Pt. Requested grits with breakfast. Called dietary staff who said pt can't have grits on her diet. Spoke with SLP Jocy GARCIA and Dr. Christensen. Both agreed that pt can have grits.

## 2024-06-30 NOTE — CONSULTS
Inpatient consult to General Surgery  Consult performed by: Pepe Brunson MD  Consult ordered by: Manuel Christensen MD  Reason for consult: cath removal  Assessment/Recommendations: At bs      Inpatient consult to General Surgery  Consult performed by: Pepe Brunson MD  Consult ordered by: Hector Blakely MD  Reason for consult: cath removal  Assessment/Recommendations: Asked to urgently remove at bs.  - complete

## 2024-06-30 NOTE — RESPIRATORY THERAPY
06/29/24 1944   Patient Assessment/Suction   Level of Consciousness (AVPU) alert   Respiratory Effort Unlabored   Expansion/Accessory Muscles/Retractions no retractions;no use of accessory muscles   All Lung Fields Breath Sounds Anterior:;diminished   Rhythm/Pattern, Respiratory no shortness of breath reported;unlabored   Cough Frequency frequent   Cough Type fair   Secretions Amount small   Secretions Color white   Secretions Characteristics thick   PRE-TX-O2   Device (Oxygen Therapy) high flow nasal cannula   Flow (L/min) (Oxygen Therapy) 2   SpO2 97 %   Pulse Oximetry Type Continuous   $ Pulse Oximetry - Multiple Charge Pulse Oximetry - Multiple   Pulse (!) 126   Resp (!) 26   Positioning HOB elevated 45 degrees   Education   $ Education 15 min;Other (see comment)

## 2024-06-30 NOTE — ASSESSMENT & PLAN NOTE
Patient has severe fluid overload secondary to ESRD, cardiac arrest in HD hemodialysis  Likely multifactorial from CHF with worsening CKD  EF 40-45%  in previous ECHO but repeat with 25% with elevated BNP  Status post tunnel line 6/22 and removed 6/29 because of superficial infection   Intubated with cardiac arrest 6/24 and extubated 6/27  HD as per nephrology   Wean oxygen   downgraded

## 2024-06-30 NOTE — ASSESSMENT & PLAN NOTE
EF 25% with elevated BNP  Will need lifevest and AICD as OP   S/p tunnel line and HD from there . Needed to remove because of possible line infection . Line culture sent   Amiodarone was on hold for bradycardia and possible allergy   Heparin drip was restarted   Patient refuse labs

## 2024-06-30 NOTE — PROGRESS NOTES
[facility]  Department of Cardiology  Progress Note    PATIENT NAME: Juhi Taylor  MRN: [id number]  TODAY'S DATE: 06/30/2024  ADMIT DATE: 6/18/2024    SUBJECTIVE     PRINCIPLE PROBLEM: Chronic kidney disease (CKD), stage V    73F fallowed with Dr. Martinez from nephrology for advanced CKD stage 4-5 getting admitted with fluid overload. Pt had PD catheter insertion 2 weeks ago in preparation for initiation of dialysis, however had post-operative issues with increased leakage. Then her PD in initiation and training was delayed due to insurance issues. She has oliguria/increased weight gain in past 2 weeks, SOB on exertion and cough. Symptoms got worse and she came to ER and got admitted.   Code blue was called on June 24th evening, she was unresponsive. Chest compressions were already underway. Rhythm was consistent with PE a arrest initially, after giving epinephrine, the rhythm appeared to change to ventricular tachycardia. We cardioverted and patient achieved ROSC. Additionallly she received bicarbonate and glucose.     INTERVAL HISTORY:    6/30/2024  Patient is much more awake and alert feels whole lot better.  Patient's rhythm is stable.  And her blood pressure has also been stable.  Her white count is slowly on the rise.    6/29/2024  Patient is alert and awake no specific complaints at the present time.  She is currently undergoing venous survey of the lower extremities.    Patient went into atrial fibrillation with rapid ventricular rate.    Her metoprolol tartrate was increased to 25 mg p.o. q. 6 hours and after giving metoprolol her heart rate continues to remain elevated.  Her white count is elevated at 17.29   Review of patient's allergies indicates:   Allergen Reactions    Percocet [oxycodone-acetaminophen] Itching    Amiodarone analogues      Itching      Irbesartan Swelling    Januvia [sitagliptin]     Jardiance [empagliflozin]      Leg cramps    Lipitor [atorvastatin] Other (See  Comments)     Severe leg pain    Linaclotide Other (See Comments) and Nausea And Vomiting     Does not remember    Lubiprostone Other (See Comments) and Palpitations     Does not remember       REVIEW OF SYSTEMS  Positive for activity change   CARDIOVASCULAR: No recent chest pain, palpitations, arm, neck, or jaw pain. Positive for leg swelling.    RESPIRATORY: Positive for cough and shortness of breath. Negative for apnea, choking, chest tightness, wheezing and stridor.   : No blood in the urine  GI: No Nausea, vomiting, constipation, diarrhea, blood, or reflux.  MUSCULOSKELETAL: No myalgias  NEURO: No lightheadedness or dizziness. Positive for weakness.   EYES: No Double vision, blurry, vision or headache     OBJECTIVE     VITAL SIGNS (Most Recent)  Temp: 98.1 °F (36.7 °C) (06/30/24 1122)  Pulse: 91 (06/30/24 1122)  Resp: (!) 33 (06/30/24 1122)  BP: 123/69 (06/30/24 1122)  SpO2: 98 % (06/30/24 1122)    VENTILATION STATUS  Resp: (!) 33 (06/30/24 1122)  SpO2: 98 % (06/30/24 1122)           I & O (Last 24H):  Intake/Output Summary (Last 24 hours) at 6/30/2024 1252  Last data filed at 6/30/2024 1220  Gross per 24 hour   Intake 450 ml   Output --   Net 450 ml       WEIGHTS  Wt Readings from Last 1 Encounters:   06/29/24 0400 132.3 kg (291 lb 10.7 oz)   06/28/24 0400 133.4 kg (294 lb 1.5 oz)   06/22/24 0443 (!) 147.9 kg (326 lb 1 oz)   06/19/24 1927 (!) 147.3 kg (324 lb 11.8 oz)   06/18/24 1202 132.5 kg (292 lb)       PHYSICAL EXAM  NECK: no carotid bruit, no JVD  LUNGS:  Bilateral decreased breath sounds at the bases  CHEST WALL: no tenderness  HEART:  In atrial fibrillation.  With tachycardia, systolic murmur audible.   ABDOMEN: soft,   EXTREMITIES: Extremities normal, bilateral ankle edema  NEURO: AAO X 3    SCHEDULED MEDS:   ceFEPime IV (PEDS and ADULTS)  1 g Intravenous Q24H    epoetin alaina-epbx  50 Units/kg Intravenous Every Mon, Wed, Fri    famotidine  20 mg Oral Daily    furosemide (LASIX) injection  40 mg  Intravenous Q12H    guaiFENesin 100 mg/5 ml  400 mg Oral BID    heparin (porcine)  5,000 Units Subcutaneous Q8H    metoprolol tartrate  50 mg Oral BID       CONTINUOUS INFUSIONS:    PRN MEDS:  Current Facility-Administered Medications:     acetaminophen, 650 mg, Oral, Q8H PRN    aluminum-magnesium hydroxide-simethicone, 30 mL, Oral, QID PRN    dextrose 50%, 12.5 g, Intravenous, PRN    dextrose 50%, 25 g, Intravenous, PRN    glucagon (human recombinant), 1 mg, Intramuscular, PRN    glucose, 24 g, Oral, PRN    melatonin, 6 mg, Oral, Nightly PRN    ondansetron, 4 mg, Intravenous, Q6H PRN    Pharmacy to dose Vancomycin consult, , , Once **AND** vancomycin - pharmacy to dose, , Intravenous, pharmacy to manage frequency    LABS AND DIAGNOSTICS     CBC LAST 3 DAYS  Recent Labs   Lab 06/28/24  0541 06/29/24  0158 06/30/24  0320   WBC 14.22* 17.29* 15.29*   RBC 4.16 4.43 4.22   HGB 10.6* 11.3* 10.6*   HCT 35.2* 38.5 36.8*   MCV 85 87 87   MCH 25.5* 25.5* 25.1*   MCHC 30.1* 29.4* 28.8*   RDW 19.3* 20.1* 20.1*   * 159 196   MPV 9.9 11.3 11.2   GRAN 77.4*  11.0* 83.4*  14.4* 75.2*  11.5*   LYMPH 10.6*  1.5 7.7*  1.3 12.8*  2.0   MONO 9.4  1.3* 7.4  1.3* 9.5  1.5*   BASO 0.03 0.02 0.03   NRBC 0 0 0       COAGULATION LAST 3 DAYS  Recent Labs   Lab 06/24/24 2016   INR 1.1       CHEMISTRY LAST 3 DAYS  Recent Labs   Lab 06/24/24  0543 06/24/24  1836 06/26/24  1322 06/27/24  0045 06/27/24  0310 06/27/24  0415 06/27/24  0539 06/28/24  0541 06/29/24  0058 06/30/24  0320      < >  --  134*  --   --   --  137 137 136   K 5.4*   < >  --  4.1  --   --   --  3.5 3.8 4.4      < >  --  102  --   --   --  104 104 104   CO2 22*   < >  --  22*  --   --   --  25 23 21*   ANIONGAP 8   < >  --  10  --   --   --  8 10 11   *   < >  --  41*  --   --   --  30* 24* 41*   CREATININE 5.5*   < >  --  3.5*  --   --   --  3.2* 3.0* 4.3*   *   < >  --  138*  --   --   --  91 121* 120*   CALCIUM 9.0   < >  --  8.6*   --   --   --  8.6* 8.7 8.9   PHOS 6.0*  --   --  3.7  --   --   --   --  4.2  --    PH  --    < > 7.440  --   --  7.437 7.434  --   --   --    MG 2.6   < >  --  2.0   < >  --   --  1.9 2.0 2.2   ALBUMIN  --    < >  --  3.3*  --   --   --  2.9* 3.1*  --    BILITOT  --    < >  --  1.0  --   --   --  1.0 1.1*  --    PROT  --    < >  --  6.0  --   --   --  5.7* 6.1  --    ALKPHOS  --    < >  --  90  --   --   --  86 96  --    ALT  --    < >  --  5*  --   --   --  6* 7*  --    AST  --    < >  --  25  --   --   --  17 17  --     < > = values in this interval not displayed.       CARDIAC PROFILE LAST 3 DAYS  Recent Labs   Lab 06/25/24  1308 06/26/24  0302 06/27/24  0045   CPK  --   --  267*   TROPONINIHS 76.1* 47.2* 52.4*       ENDOCRINE LAST 3 DAYS  Recent Labs   Lab 06/29/24  1607   PROCAL 0.845*       LAST ARTERIAL BLOOD GAS  ABG  Recent Labs   Lab 06/27/24  0539   PH 7.434   PO2 78*   PCO2 34.3*   HCO3 23.0*   BE -1       LAST 7 DAYS MICROBIOLOGY   Microbiology Results (last 7 days)       Procedure Component Value Units Date/Time    IV catheter culture [[id number]] Collected: 06/29/24 2255    Order Status: Completed Specimen: Catheter Tip, Subclavian Updated: 06/30/24 0825     Aerobic Culture - Cath tip No growth    Narrative:      Left subclavian HD catheter    Blood culture [[id number]] Collected: 06/29/24 1607    Order Status: Completed Specimen: Blood Updated: 06/29/24 2317     Blood Culture, Routine No Growth to date    Blood culture [[id number]] Collected: 06/29/24 1607    Order Status: Completed Specimen: Blood Updated: 06/29/24 2317     Blood Culture, Routine No Growth to date    Blood culture [[id number]] Collected: 06/24/24 2016    Order Status: Completed Specimen: Blood from Peripheral, Hand, Right Updated: 06/29/24 2232     Blood Culture, Routine No growth after 5 days.    Culture, Respiratory with Gram Stain [[id number]] Collected: 06/24/24 1848    Order Status: Completed Specimen: Respiratory from Sputum,  Expectorated Updated: 06/26/24 0918     Respiratory Culture No normal respiratory lesley     Gram Stain (Respiratory) <10 epithelial cells per low power field.     Gram Stain (Respiratory) Few WBC's     Gram Stain (Respiratory) No organisms seen            MOST RECENT IMAGING  US Carotid Bilateral  Narrative: EXAMINATION:  US CAROTID BILATERAL    CLINICAL HISTORY:  cardiac arrest; coronary artery disease    TECHNIQUE:  Grayscale, color and spectral Doppler analysis was performed. Criteria for stenosis is based upon the Society of Radiologists in Ultrasound Consensus Conference, 2003 (Radiology, November 2003, 229, 340-346). This is in accordance with NASCET criteria.    FINDINGS:  Comparison to prior exams.  Grayscale images show diffuse hypoechoic carotid intimal hyperplasia.    Peak systolic velocity in the proximal right ICA is 48.1 cm/sec, and in the distal right ICA 76.8 cm/sec, with ICA/CCA ratio of 1.4.    Peak systolic velocity in the proximal left ICA is 39 cm/sec, and in the distal left ICA 40.3 cm/sec, with ICA/CCA ratio of 1.1.    The vertebral arteries are patent, with normal waveforms, and normal antegrade flow bilaterally.  Impression: 1. No findings of hemodynamically significant carotid arterial stenosis or vascular occlusion.    2. Patent vertebral arteries with antegrade flow bilaterally.    Electronically signed by: Niko Mendiola  Date:    06/29/2024  Time:    13:18  US Lower Extremity Veins Bilateral  Narrative: EXAMINATION:  US LOWER EXTREMITY VEINS BILATERAL    CLINICAL HISTORY:  de oxygenation and swelling;    FINDINGS:  Grayscale, color and spectral Doppler analysis of the bilateral lower extremity deep venous system was performed. Comparison to prior exams.    There is normal compressibility, with normal flow by color and spectral Doppler analysis in the bilateral lower extremity deep venous system.  There is normal augmentation and no evidence of deep venous thrombosis.    There is a 50 x 28  x 24 mm fusiform, heterogeneously echoic complex cyst in the right popliteal fossa, with increased through transmission of sound, and no internal color Doppler vascular flow.  Impression: 1. Negative for lower extremity deep venous thrombosis.  2. Right popliteal fossa complex Baker's cyst.    Electronically signed by: Niko Mendiola  Date:    06/29/2024  Time:    13:15      [facility]  Results for orders placed during the hospital encounter of 06/18/24    Echo    Interpretation Summary    Left Ventricle: The left ventricle is normal in size. Normal wall thickness. Severe global hypokinesis present. There is severely reduced systolic function with a visually estimated ejection fraction of 20 - 25%. Grade III diastolic dysfunction.    Right Ventricle: Mild right ventricular enlargement. Wall thickness is normal. Right ventricle wall motion has global hypokinesis. Systolic function is moderately reduced.    Left Atrium: Left atrium is moderately dilated.    Tricuspid Valve: There is moderate regurgitation with an anteromedial eccentrically directed jet.    Overall the study quality was technically difficult. The study was difficult due to patient's clinical status and body habitus.      CURRENT/PREVIOUS VISIT EKG  Results for orders placed or performed during the hospital encounter of 06/18/24   EKG 12-lead    Collection Time: 06/26/24  1:46 AM   Result Value Ref Range    QRS Duration 136 ms    OHS QTC Calculation 482 ms    Narrative    Test Reason : R00.1,    Vent. Rate : 055 BPM     Atrial Rate : 055 BPM     P-R Int : 154 ms          QRS Dur : 136 ms      QT Int : 504 ms       P-R-T Axes : 034 -11 189 degrees     QTc Int : 482 ms    Sinus bradycardia with occasional Premature ventricular complexes  Right bundle branch block  LVH with repolarization abnormality  Possible Lateral infarct ,age undetermined  Abnormal ECG  When compared with ECG of 25-JUN-2024 00:30,  Right bundle branch block is now Present    Referred By:  AAAREFERR   SELF           Confirmed By:        ASSESSMENT/PLAN:     Active Hospital Problems    Diagnosis    *Chronic kidney disease (CKD), stage V    Cardiac arrest    CHF (congestive heart failure)    Acute hypoxic respiratory failure    Morbid obesity    Renal cell carcinoma of right kidney    Paroxysmal atrial fibrillation    Type 2 diabetes mellitus with hyperglycemia    Coronary artery disease    Essential hypertension       Summary    Procedures performed:  Ultrasound guided right brachial vein (due to no venous access) and right brachial artery access  Coronary angiography  Angioplasty stenting of the ostial circumflex and mid circumflex  Angioplasty stenting of the mid LAD  Abdominal/nonselective renal artery angiogram bilateral     Angiographic findings:  Left main coronary-large vessel normal appearance.  Left anterior descending-normal size vessel diffuse disease proximal mid with up to 85% stenosis just after 1st diagonal.  First diagonal normal size long vessel diffuse mild disease less than 35%.  Mid LAD has less than 35% stenosis.  Circumflex-large vessel with the ostial 95% stenosis followed by an 85% stenosis in the mid circumflex.  First obtuse marginal large branching vessel diffuse mild disease less than 35%.  Remainder circumflex has less than 35% stenosis.  Right coronary artery-large dominant vessel diffuse disease seen throughout entire course of up to 35% proximally 50% distally  Bilateral renal arteries are normal size normal appearance     Intervention:  Successful angioplasty and stenting of the mid circumflex using a Synergy 3.0 x 38 post dilated to 3.50% residual stenosis and YANELY 3 flow.  Successful angioplasty and stenting of the ostial circumflex using a Synergy 4.5 x 16 post dilated to 475 0% restenosis and YANELY 3 flow.  Successful angioplasty stenting of the LAD using a Synergy 3.0 x 32 post dilated to 3.5 was 0% restenosis and YANELY 3 flow.     Impression:  Critical stenosis of  the ostial circumflex, severe stenosis of mid circumflex and mid LAD with successful angioplasty stenting as above  Normal bilateral renal arteries.     Plan:  Dual antiplatelet therapy minimum 1 year aggressive risk factor modification  IV hydration today 1 dose of Lasix this afternoon continue current medications titrate up her blood pressure control.         ASSESSMENT & PLAN:   Cardiac arrest  Ventricular tachycardia  Left bundle branch block  Cardiomyopathy  Acute hypoxic respiratory failure  CAD  H/O PCI x3 in 2020  Hypertension  Congestive heart failure  Paroxysmal atrial fibrillation  Diabetes mellitus 2  CKD stage 5/ESRD  Morbid Obesity  Renal cell carcinoma of right kidney -right nephrectomy  Thrombocytopenia       RECOMMENDATIONS:  1. Patient is in atrial fibrillation and patient states that she had been on Eliquis in the past and discussed with patient that she would need to be back on Eliquis 5 mg p.o. b.i.d..  If okay with the renal doctors.  Recommend starting Eliquis 5 mg p.o. b.i.d..  2. Elevated WBC suspicious for sepsis.  Currently on cefepime and will defer the treatment to the hospitalist and infectious disease doctor.  3. Renal failure and on hemodialysis.    4. Patient has had coronary artery disease and stent placement in the past in the LAD and she would benefit from cardiac catheterization however patient refused cardiac catheterization in the past however will again discuss with her in the near future.  5. Will start her on sotalol 40 mg p.o. b.i.d. and hold metoprolol for now and increase the sotalol to 80 mg p.o. b.i.d. if she has no bradycardia and her QT and QTC intervals or less than 500.  6. EKG in the morning          1. Cardiac arrest/VT/left bundle branch block  Patient has not had any episodes of arrhythmias other than atrial fibrillation.  2. Atrial fibrillation   Currently on heparin 5000 units subQ q.8 hours.  There is a mild lose around the dialysis catheter.  3. Patient is  in atrial fibrillation with elevated heart rate and relative hypotension concerned for sepsis and also there is an increased requirement for oxygen.  Workup for DVT PE.  4. Hypokalemia and hypomagnesemia can be a adjusted in the dialysate.  However she would benefit from receiving 20 mEq of potassium.        -Patient is status post PEA/V-tach arrest on 06/24/2024  -Pressors have been weaned off and she was extubated today  -Echocardiogram showed drop in EF to 20-25% (reduced EF since 2021)  -Left bundle-branch block noted on EKG.  -Pt will need ICD implanted once recovers from pulmonary standpoint.   - Patient isn't sure if she wants to do angiogram at this point  -Platelets continue to improve daily, Monitor CBC daily  -Recommend start Eliquis 2.5 mg b.i.d. for anticoagulation in presence of PAF.  Patient states she does not want to do this nor start Plavix.       -Rhythm has been sinus rhythm with PVCs, no recurrent VT.    Started Lopressor yesterday, continue Lopressor to 12.5 mg TID   -Family reports patient had bad experience w/ amiodarone previously.  Amiodarone is listed as an allergy on her chart   1. Discussed with patient again at length and in detail.  And patient refuses amiodarone.  And at the present time she also does not want cardiac catheterization.  And she might consider sometime later.  2. She is tolerating metoprolol tartrate well continue the same.  3. Need to consider anticoagulation with heparin as she is at risk for DVT  Recommend heparin 5000 units subQ q.8 hours.  And 1st dose now.    -Closely monitor electrolytes daily. K+ is 3.5 & Magnesium 1.9 today   Patient has been extubated and she does say that she does understand.   Patient is refusing amiodarone and apparently she had refused amiodarone in the past.    Patient is tolerating metoprolol tartrate 12.5 mg p.o. b.I.d. will increase it to t.I.d. dosing today and her ectopy has decreased significantly.  Discussed with patient's  daughters and patient in regards with cardiac catheterization and coronary angiography and patient is refusing for cardiac catheterization and coronary angiography.   Patient's platelets have improved her current platelets a 122 and consideration for starting her on anticoagulation with Eliquis but patient is refusing Eliquis.  And she is also refusing anticoagulation with heparin.   Consideration for Plavix 75 mg p.o. daily.   Discussed with patient's daughters who were present in the room in regards with her current management.  Dennis Trejo MD  Date of Service: 06/30/2024  12:52 PM

## 2024-06-30 NOTE — PROCEDURES
"Juhi Taylor is a 74 y.o. female patient.    Temp: 98.2 °F (36.8 °C) (06/29/24 1901)  Pulse: (!) 126 (06/29/24 1944)  Resp: (!) 26 (06/29/24 1944)  BP: (!) 108/58 (06/29/24 2025)  SpO2: 97 % (06/29/24 1944)  Weight: 132.3 kg (291 lb 10.7 oz) (06/29/24 0400)  Height: 5' 7" (170.2 cm) (06/28/24 1449)       Consent obtained verbally with patient and niece at bedside.  Explained risks of bleeding, retained foreign body, and need for another catheter.  Both voiced understanding and provided informed consent.    Tunneled catheter removal    Date/Time: 6/18/2024 11:58 AM  Location procedure was performed: [facility] ICU 3RD FLOOR    Performed by: Pepe Brunson MD  Authorized by: Pepe Brunson MD  Pre-operative diagnosis: infection of tunneled catheter  Post-operative diagnosis: same  Body area: trunk  Location details: chest  Description of findings: excoriated skin around catheter with cellulitis   Sutures Removed: 3  Post-removal: bandaid applied  Facility: sutures placed in this facility  Technical procedures used: Lidocaine 13 cc 1 percent used to anesthesize skin around catheter, tunnel, and insertion to juglar vein.  Sutures removed, gentle retraction used to remove catheter intact along with attached bumper  Complications: No  Estimated blood loss (mL): 1  Specimens: Yes  Implants: No  Comments: Tip culture was sent of catheter          6/29/2024    "

## 2024-06-30 NOTE — PLAN OF CARE
Problem: Infection  Goal: Absence of Infection Signs and Symptoms  Outcome: Progressing     Problem: Adult Inpatient Plan of Care  Goal: Plan of Care Review  Outcome: Progressing  Goal: Patient-Specific Goal (Individualized)  Outcome: Progressing  Goal: Absence of Hospital-Acquired Illness or Injury  Outcome: Progressing  Goal: Optimal Comfort and Wellbeing  Outcome: Progressing  Goal: Readiness for Transition of Care  Outcome: Progressing     Problem: Bariatric Environmental Safety  Goal: Safety Maintained with Care  Outcome: Progressing     Problem: Fall Injury Risk  Goal: Absence of Fall and Fall-Related Injury  Outcome: Progressing     Problem: Skin Injury Risk Increased  Goal: Skin Health and Integrity  Outcome: Progressing     Problem: Diabetes Comorbidity  Goal: Blood Glucose Level Within Targeted Range  Outcome: Progressing     Problem: Wound  Goal: Optimal Coping  Outcome: Progressing  Goal: Optimal Functional Ability  Outcome: Progressing  Goal: Absence of Infection Signs and Symptoms  Outcome: Progressing  Goal: Improved Oral Intake  Outcome: Progressing  Goal: Optimal Pain Control and Function  Outcome: Progressing  Goal: Skin Health and Integrity  Outcome: Progressing  Goal: Optimal Wound Healing  Outcome: Progressing     Problem: Hemodialysis  Goal: Safe, Effective Therapy Delivery  Outcome: Progressing  Goal: Effective Tissue Perfusion  Outcome: Progressing  Goal: Absence of Infection Signs and Symptoms  Outcome: Progressing     Problem: Restraint, Nonviolent  Goal: Absence of Harm or Injury  Outcome: Progressing    Pt. Coorprative with care for the most part. She does get mad and yell at her family members for trying to help care for her. No diuresis since lasix. Pt. Refuses to turn to check her pads to know for sure. Pt. Refused to drink any Nepro. She also refused any dressing changes for me. Otherwise this has been an uneventful shift.

## 2024-06-30 NOTE — NURSING
Nurses Note -- 4 Eyes      6/30/2024   9:35 AM      Skin assessed during: Daily Assessment      [] No Altered Skin Integrity Present    []Prevention Measures Documented      [x] Yes- Altered Skin Integrity Present or Discovered   [] LDA Added if Not in Epic (Describe Wound)   [] New Altered Skin Integrity was Present on Admit and Documented in LDA   [] Wound Image Taken    Wound Care Consulted? Yes    Attending Nurse:  Ninfa Strickland RN/Staff Member:  NEENA Romeo

## 2024-06-30 NOTE — ASSESSMENT & PLAN NOTE
Chronic stable issue  Continue lopressor. And increased dose home apixab. ( Patient refused to restart )  No more oozing from HD tunnel catheter

## 2024-06-30 NOTE — SUBJECTIVE & OBJECTIVE
"Interval History:    Patient seen and examined  Patient is eating breakfast and the dialysis line was removed yesterday    Review of Systems  Objective:     Vital Signs (Most Recent):  Temp: 98.1 °F (36.7 °C) (06/30/24 1122)  Pulse: 91 (06/30/24 1122)  Resp: (!) 33 (06/30/24 1122)  BP: 123/69 (06/30/24 1122)  SpO2: 98 % (06/30/24 1122) Vital Signs (24h Range):  Temp:  [97.8 °F (36.6 °C)-98.3 °F (36.8 °C)] 98.1 °F (36.7 °C)  Pulse:  [] 91  Resp:  [21-38] 33  SpO2:  [90 %-100 %] 98 %  BP: (100-142)/(58-87) 123/69     Weight: 132.3 kg (291 lb 10.7 oz)  Body mass index is 45.68 kg/m².    Intake/Output Summary (Last 24 hours) at 6/30/2024 1223  Last data filed at 6/30/2024 1220  Gross per 24 hour   Intake 450 ml   Output --   Net 450 ml         Physical Exam  Vitals and nursing note reviewed.   HENT:      Head: Normocephalic and atraumatic.   Eyes:      Conjunctiva/sclera: Conjunctivae normal.   Neck:      Vascular: No JVD.   Cardiovascular:      Heart sounds: Normal heart sounds.   Pulmonary:      Effort: Pulmonary effort is normal.      Comments: Decreased   Abdominal:      Palpations: Abdomen is soft.   Musculoskeletal:         General: Normal range of motion.   Skin:     General: Skin is warm.   Neurological:      Mental Status: She is alert and oriented to person, place, and time.   Psychiatric:         Mood and Affect: Mood normal.             Significant Labs: All pertinent labs within the past 24 hours have been reviewed.  CMP:   Recent Labs   Lab 06/29/24  0058 06/30/24  0320    136   K 3.8 4.4    104   CO2 23 21*   * 120*   BUN 24* 41*   CREATININE 3.0* 4.3*   CALCIUM 8.7 8.9   PROT 6.1  --    ALBUMIN 3.1*  --    BILITOT 1.1*  --    ALKPHOS 96  --    AST 17  --    ALT 7*  --    ANIONGAP 10 11     Cardiac Markers: No results for input(s): "CKMB", "MYOGLOBIN", "BNP", "TROPISTAT" in the last 48 hours.  Coagulation: No results for input(s): "PT", "INR", "APTT" in the last 48 " hours.    Significant Imaging: I have reviewed all pertinent imaging results/findings within the past 24 hours.

## 2024-06-30 NOTE — PROGRESS NOTES
[facility]  Progress Note    Patient Name: Juhi Taylor  MRN: [id number]  Patient Class: IP- Inpatient   Admission Date: 6/18/2024  Length of Stay: 11 days  Attending Physician: Manuel Christensen MD  Primary Care Provider: Tony Sadler MD        Subjective:     Principal Problem:Chronic kidney disease (CKD), stage V        HPI:  73 year old pt getting admitted with fluid overload in need of dialysis  Pt had PD catheter insertion 2 weeks ago  She was supposed to start on PD for ESRD but got delayed due to insurance issues  She has oliguria/increased weight gain in past 2 weeks  Also SOB on exertion and cough  Symptoms got worse and she came to ER and got admitted  Pt was started on lasix gtt iv         Overview/Hospital Course:  Patient is a 73-year-old female who was admitted for acute hypoxic respiratory failure likely multifactorial from CKD and CHF .  Patient had PD cath placed 2 weeks ago however due to insurance situation never received dialysis. EF 40 -45% with elevated BNP. Patient was placed on Furosemide and metolazone with poor urine output eventually leading to dialysis.Nephrology is following. Discontinued antibiotics since infection was ruled out.   Patient got PE cardiac arrest during the dialysis and then resuscitated and got ROSC quickly and admitted to ICU and intubated.  Later initiated hemodialysis again and later was able to extubate.  Patient had significant elevation of troponin cardiology consulted.  No acute intervention recommended because of clinical condition and later patient is not keen to do the angiogram was stress test.  Patient was on heparin but needed to hold for some days because of oozing from the left dialysis catheter access and DDAVP 1 dose given.  Patient also developed thrombocytopenia most likely from dialysis but later platelet count improved and heparin restarted.  Echocardiogram was done and showed about 25% ejection  fraction and Cardiology reviewed and recommended ICD.  Patient also developed AFib with RVR and needed amiodarone drip briefly but patient history of allergy and did not continue.  Patient is getting dialysis and over patient is feeling better and downgraded.  Patient is going to need rehab placement    Interval History:    Patient seen and examined  Patient is eating breakfast and the dialysis line was removed yesterday    Review of Systems  Objective:     Vital Signs (Most Recent):  Temp: 98.1 °F (36.7 °C) (06/30/24 1122)  Pulse: 91 (06/30/24 1122)  Resp: (!) 33 (06/30/24 1122)  BP: 123/69 (06/30/24 1122)  SpO2: 98 % (06/30/24 1122) Vital Signs (24h Range):  Temp:  [97.8 °F (36.6 °C)-98.3 °F (36.8 °C)] 98.1 °F (36.7 °C)  Pulse:  [] 91  Resp:  [21-38] 33  SpO2:  [90 %-100 %] 98 %  BP: (100-142)/(58-87) 123/69     Weight: 132.3 kg (291 lb 10.7 oz)  Body mass index is 45.68 kg/m².    Intake/Output Summary (Last 24 hours) at 6/30/2024 1223  Last data filed at 6/30/2024 1220  Gross per 24 hour   Intake 450 ml   Output --   Net 450 ml         Physical Exam  Vitals and nursing note reviewed.   HENT:      Head: Normocephalic and atraumatic.   Eyes:      Conjunctiva/sclera: Conjunctivae normal.   Neck:      Vascular: No JVD.   Cardiovascular:      Heart sounds: Normal heart sounds.   Pulmonary:      Effort: Pulmonary effort is normal.      Comments: Decreased   Abdominal:      Palpations: Abdomen is soft.   Musculoskeletal:         General: Normal range of motion.   Skin:     General: Skin is warm.   Neurological:      Mental Status: She is alert and oriented to person, place, and time.   Psychiatric:         Mood and Affect: Mood normal.             Significant Labs: All pertinent labs within the past 24 hours have been reviewed.  CMP:   Recent Labs   Lab 06/29/24  0058 06/30/24  0320    136   K 3.8 4.4    104   CO2 23 21*   * 120*   BUN 24* 41*   CREATININE 3.0* 4.3*   CALCIUM 8.7 8.9   PROT 6.1   "--    ALBUMIN 3.1*  --    BILITOT 1.1*  --    ALKPHOS 96  --    AST 17  --    ALT 7*  --    ANIONGAP 10 11     Cardiac Markers: No results for input(s): "CKMB", "MYOGLOBIN", "BNP", "TROPISTAT" in the last 48 hours.  Coagulation: No results for input(s): "PT", "INR", "APTT" in the last 48 hours.    Significant Imaging: I have reviewed all pertinent imaging results/findings within the past 24 hours.    Assessment/Plan:      * Chronic kidney disease (CKD), stage V  Patient had PD cath placed 2 weeks ago however due to insurance situation never received dialysis  Tunneled dialysis catheter was placed 6/22  Revision 6/23  Patient has persistent oozing from dialysis catheter  and DDAVP given and now stopped   HD as per nephrology   Tunnel catheter needed to remove because of possible infection . Cultures neg    Cardiac arrest  Rhythm PEA. Initially bradycardiac  now A fib with RVR  Possible cardiac cause . Cardiology on the case   Cannot give blood thinners initially. Oozing from tunnel catheter  and thrombocytopenia  Severe HF with EF 20% . Need life vest at DC. AICD as OP   Cardiology follow up       CHF (congestive heart failure)  EF 25% with elevated BNP  Will need lifevest and AICD as OP   S/p tunnel line and HD from there . Needed to remove because of possible line infection . Line culture sent   Amiodarone was on hold for bradycardia and possible allergy   Heparin drip was restarted   Patient refuse labs     Morbid obesity  Body mass index is 45.68 kg/m². Morbid obesity complicates all aspects of disease management from diagnostic modalities to treatment.     Acute hypoxic respiratory failure  Patient has severe fluid overload secondary to ESRD, cardiac arrest in HD hemodialysis  Likely multifactorial from CHF with worsening CKD  EF 40-45%  in previous ECHO but repeat with 25% with elevated BNP  Status post tunnel line 6/22 and removed 6/29 because of superficial infection   Intubated with cardiac arrest 6/24 and " "extubated 6/27  HD as per nephrology   Wean oxygen   downgraded        Renal cell carcinoma of right kidney  Aware       Paroxysmal atrial fibrillation  Chronic stable issue  Continue lopressor. And increased dose home apixab. ( Patient refused to restart )  No more oozing from HD tunnel catheter      Type 2 diabetes mellitus with hyperglycemia  Patient's FSGs are controlled on current medication regimen.  Last A1c reviewed-   Lab Results   Component Value Date    HGBA1C 6.8 (H) 06/19/2024     Most recent fingerstick glucose reviewed- No results for input(s): "POCTGLUCOSE" in the last 24 hours.  Current correctional scale  Medium  Maintain anti-hyperglycemic dose as follows-   Antihyperglycemics (From admission, onward)      None          Hold Oral hypoglycemics while patient is in the hospital.    Essential hypertension  Chronic, controlled. Latest blood pressure and vitals reviewed-     Temp:  [97.8 °F (36.6 °C)-98.3 °F (36.8 °C)]   Pulse:  []   Resp:  [21-38]   BP: (100-142)/(58-87)   SpO2:  [90 %-100 %] .   Home meds for hypertension were reviewed and noted below.   Hypertension Medications               carvediloL (COREG) 25 MG tablet Take 1 tablet (25 mg total) by mouth 2 (two) times daily.    cloNIDine (CATAPRES) 0.1 MG tablet Take 1 tablet (0.1 mg total) by mouth 3 (three) times daily as needed (PRN SBP > 165 mmHg).    cloNIDine 0.1 mg/24 hr td ptwk (CATAPRES) 0.1 mg/24 hr Place 1 patch onto the skin every 7 days.    furosemide (LASIX) 40 MG tablet Take 40 mg by mouth once daily.    amLODIPine (NORVASC) 10 MG tablet Take 10 mg by mouth once daily.    nitroGLYCERIN (NITROSTAT) 0.4 MG SL tablet Place 1 tablet (0.4 mg total) under the tongue every 5 (five) minutes as needed for Chest pain.            While in the hospital, will manage blood pressure as follows; Continue home antihypertensive regimen    Will utilize p.r.n. blood pressure medication only if patient's blood pressure greater than 140/90 and " she develops symptoms such as worsening chest pain or shortness of breath.    Coronary artery disease  Stable  Continue with medical management  + troponin . Unsure arrest secondary to cardiac cause . H/o a fib noted  Heparin drip restarted   Amiodarone patient has allergy and lopressor  for now and dose increased   Patient refused angiogram       VTE Risk Mitigation (From admission, onward)           Ordered     heparin (porcine) injection 5,000 Units  Every 8 hours         06/28/24 1806     IP VTE HIGH RISK PATIENT  Once         06/18/24 1519     Place sequential compression device  Until discontinued         06/18/24 1519                    Discharge Planning   HARINI: 7/2/2024     Code Status: Full Code   Is the patient medically ready for discharge?:     Reason for patient still in hospital (select all that apply): Treatment  Discharge Plan A: Rehab            Critical care time spent on the evaluation and treatment of severe organ dysfunction, review of pertinent labs and imaging studies, discussions with consulting providers and discussions with patient/family: 23 minutes.      Manuel Christensen MD  Department of Hospital Medicine   [facility]

## 2024-06-30 NOTE — NURSING
"Pt. Agreed to let me change her rt arm dressing. When I entered the room to change it at 1820, pt said she had chest pressure that was "there all day". EKG done which showed Afib, rate 98. I explained that pt had chest compressions done which contributed to pain. Offered tylenol for pain and pt refused.   "

## 2024-06-30 NOTE — PT/OT/SLP PROGRESS
Speech Language Pathology      Juhi Taylor  MRN: [id number]    ST follow up attempted at 9:50AM. Patient not seen today secondary to working with PT/OT. Will follow-up next scheduled visit date.

## 2024-06-30 NOTE — ASSESSMENT & PLAN NOTE
Stable  Continue with medical management  + troponin . Unsure arrest secondary to cardiac cause . H/o a fib noted  Heparin drip restarted   Amiodarone patient has allergy and lopressor  for now and dose increased   Patient refused angiogram

## 2024-06-30 NOTE — PROGRESS NOTES
Pharmacist Renal Dose Adjustment Note    Juhi Taylor is a 74 y.o. female being treated with the medication Cefepime.    Patient Data:    Vital Signs (Most Recent):  Temp: 98.3 °F (36.8 °C) (06/30/24 1600)  Pulse: 106 (06/30/24 1600)  Resp: (!) 25 (06/30/24 1600)  BP: 129/75 (06/30/24 1600)  SpO2: 97 % (06/30/24 1600) Vital Signs (72h Range):  Temp:  [97.6 °F (36.4 °C)-98.6 °F (37 °C)]   Pulse:  []   Resp:  [14-59]   BP: (100-183)/()   SpO2:  [79 %-100 %]      Recent Labs   Lab 06/28/24  0541 06/29/24  0058 06/30/24  0320   CREATININE 3.2* 3.0* 4.3*     Serum creatinine: 4.3 mg/dL (H) 06/30/24 0320  Estimated creatinine clearance: 16.8 mL/min (A)    Cefepime 1 gram IV every 24 hours will be changed to Cefepime 2 gram IV every 24 hours.    Pharmacist's Name: Belle Navarro  Pharmacist's Extension: [phone]

## 2024-06-30 NOTE — NURSING
Dr. Brunson at bedside to remove tunneled cath. Hemodialysis cath removed, tolerated well by patient. Tip of catheter sent off for culture. No arrythmias. VSS. No signs of trauma, injury, or distress.

## 2024-06-30 NOTE — PROGRESS NOTES
This is the temporary night coverage physician note. This note acts as sign off note to the primary attending for this patient.     Called by nurse that the patient continues to be in a-fib with HR in 120s, she only was given 2 doses of metoprolol 25 mg in the last 24 hours. Will change to 50 mg BID starting now, monitor response.

## 2024-06-30 NOTE — PROGRESS NOTES
Pulmonary/Critical Care   Progress Note      PATIENT NAME: Juhi Taylor  MRN: [id number]  TODAY'S DATE: 2024  5:28 PM  ADMIT DATE: 2024  AGE: 74 y.o. : 1950    CONSULT REQUESTED BY: Manuel Christensen MD    REASON FOR CONSULT:   Cardiac arrest     HPI:  Ms Taylor is a 73-year-old woman who presents with cardiac arrest.    Code blue was called on  evening, she was unresponsive.  Chest compressions were already underway.  Rhythm was consistent with PE a arrest initially, after giving epinephrine, the rhythm appeared to change to ventricular tachycardia.  We cardioverted and patient achieved ROSC.  Additionallly she received bicarbonate and glucose.     Today:   Did well with SBT- tolerating dialysis.     2024 0 Stable overnight, has been tolerating dialysis.  Her mentation is cloudy.  No respiratory complaints and she remains stable extubated. CXR with CM, better edema, ECHO noted with EF 25%    2024 - STable overnight, no new respiratory complaints reported.  Back in AF with increased HR (being addressed), LE dopplers negative for DVT    2024 - STable overnight, no new respiratory complaints.  AF is still and issue and HR is increased.  No new respiratory complaints but does have some congestion    Review of Systems   Constitutional:  Positive for activity change. Negative for appetite change, chills, fatigue and fever.   HENT:  Positive for congestion. Negative for hearing loss, nosebleeds, postnasal drip, sneezing and sore throat.    Respiratory:  Positive for cough and shortness of breath. Negative for apnea, choking, chest tightness, wheezing and stridor.    Cardiovascular:  Positive for leg swelling. Negative for chest pain and palpitations.   Gastrointestinal:  Negative for abdominal distention, abdominal pain, constipation, diarrhea and nausea.   Genitourinary:  Negative for dysuria, frequency and urgency.   Musculoskeletal:  Negative for arthralgias and  myalgias.   Neurological:  Positive for weakness. Negative for syncope and numbness.   Hematological:  Negative for adenopathy.   Psychiatric/Behavioral:  Negative for dysphoric mood. The patient is not nervous/anxious.            ALLERGIES  Review of patient's allergies indicates:   Allergen Reactions    Percocet [oxycodone-acetaminophen] Itching    Amiodarone analogues      Itching      Irbesartan Swelling    Januvia [sitagliptin]     Jardiance [empagliflozin]      Leg cramps    Lipitor [atorvastatin] Other (See Comments)     Severe leg pain    Linaclotide Other (See Comments) and Nausea And Vomiting     Does not remember    Lubiprostone Other (See Comments) and Palpitations     Does not remember       INPATIENT SCHEDULED MEDICATIONS   ceFEPime IV (PEDS and ADULTS)  1 g Intravenous Q24H    epoetin alaina-epbx  50 Units/kg Intravenous Every Mon, Wed, Fri    famotidine  20 mg Oral Daily    furosemide (LASIX) injection  40 mg Intravenous Q12H    guaiFENesin 100 mg/5 ml  400 mg Oral BID    heparin (porcine)  5,000 Units Subcutaneous Q8H    metoprolol tartrate  50 mg Oral BID           MEDICAL AND SURGICAL HISTORY  Past Medical History:   Diagnosis Date    Anticoagulant long-term use     Arthritis     Breast cancer 2014    invasive lobular carcinoma    Cancer of kidney 11/2020    RIGHT KIDNEY CANCER    CHF (congestive heart failure)     Coronary artery disease dx 2005    Depression     Diabetes mellitus     Diastolic heart failure secondary to hypertension     Gout     Hyperlipemia     Hypertension     Hypertrophy of nasal turbinates     Kidney mass 2020    Right    Levoscoliosis     Lung nodule     left    Multiple thyroid nodules     NICOLE (obstructive sleep apnea)     uses C-PAP    Pulmonary hypertension      Past Surgical History:   Procedure Laterality Date    AORTOGRAPHY N/A 12/04/2020    Procedure: Aortogram;  Surgeon: Paul Pedersen MD;  Location: [facility];  Service: Cardiology;  Laterality: N/A;    BREAST SURGERY  "     CARDIAC CATHETERIZATION  2020    CHOLECYSTECTOMY      COLONOSCOPY      multi -last      CORONARY ARTERY BYPASS GRAFT      ESOPHAGOGASTRODUODENOSCOPY      2012     HAND SURGERY Right     INSERTION OF CATHETER N/A 2024    Procedure: Insertion,catheter;  Surgeon: Meli Griffin MD;  Location: [facility] OR;  Service: Vascular;  Laterality: N/A;  ORIGINAL CATHETER WAS REPOSITIONED.  NO NEW CATHETER WAS PLACED    INSERTION, CATHETER, DIALYSIS, PERITONEAL N/A 2024    Procedure: IN Insertion Catheter, Dialysis, Peritoneal - laparoscopic;  Surgeon: Rodriguez Catalan Jr., MD;  Location: [facility] OR;  Service: General;  Laterality: N/A;    LAPAROSCOPIC ROBOT-ASSISTED SURGICAL REMOVAL OF KIDNEY USING DA CHELLE XI Right 03/10/2022    Procedure: XI ROBOTIC NEPHRECTOMY- radical;  Surgeon: Rolando Ramirez MD;  Location: [facility] OR;  Service: Urology;  Laterality: Right;    MASTECTOMY W/ SENTINEL NODE BIOPSY Bilateral 2015    bilateral "dog ears"    NASAL SINUS SURGERY      Dr Bryant FESS/cauterization turbinate     PARTIAL HYSTERECTOMY      PERCUTANEOUS TRANSLUMINAL BALLOON ANGIOPLASTY OF CORONARY ARTERY  2020    Procedure: Angioplasty-coronary;  Surgeon: Paul Pedersen MD;  Location: [facility] CATH;  Service: Cardiology;;    RENAL BIOPSY Right     2021 EJ    TUBAL LIGATION      ULTRASOUND GUIDANCE  2020    Procedure: Ultrasound Guidance;  Surgeon: Paul Pedersen MD;  Location: [facility] CATH;  Service: Cardiology;;       ALCOHOL, TOBACCO AND DRUG USE  Social History     Tobacco Use   Smoking Status Former    Current packs/day: 0.00    Types: Cigarettes    Start date: 2016    Quit date: 2016    Years since quittin.5   Smokeless Tobacco Never   Tobacco Comments    quit      Social History     Substance and Sexual Activity   Alcohol Use No     Social History     Substance and Sexual Activity   Drug Use No       FAMILY HISTORY  Family History   Problem Relation Name Age " of Onset    Breast cancer Mother      Stroke Father Waqar Sr.     Hypertension Father Waqar Thompson.     Hepatitis Brother      Asthma Daughter Nell Taylor     Birth defects Daughter Nell Camejo's two children has cleft lips    Depression Daughter Nell Taylor     Drug abuse Daughter Nell Taylor     Learning disabilities Daughter Nell Taylor     Mental illness Daughter Nell Taylor     Breast cancer Maternal Aunt      Glaucoma Sister      Drug abuse Daughter Nell     Macular degeneration Neg Hx      Retinal detachment Neg Hx         VITAL SIGNS (MOST RECENT)  Temp: 98.1 °F (36.7 °C) (06/30/24 1122)  Pulse: 91 (06/30/24 1122)  Resp: (!) 33 (06/30/24 1122)  BP: 123/69 (06/30/24 1122)  SpO2: 98 % (06/30/24 1122)    INTAKE AND OUTPUT (LAST 24 HOURS):  Intake/Output Summary (Last 24 hours) at 6/30/2024 1149  Last data filed at 6/30/2024 0700  Gross per 24 hour   Intake 650 ml   Output --   Net 650 ml       WEIGHT  Wt Readings from Last 1 Encounters:   06/29/24 132.3 kg (291 lb 10.7 oz)       Physical Exam  Vitals reviewed.   Constitutional:       General: She is not in acute distress.     Appearance: She is well-developed. She is obese. She is ill-appearing (chronic). She is not toxic-appearing or diaphoretic.   HENT:      Head: Normocephalic and atraumatic.      Mouth/Throat:      Pharynx: No oropharyngeal exudate or posterior oropharyngeal erythema.   Eyes:      General: No scleral icterus.     Pupils: Pupils are equal, round, and reactive to light.   Neck:      Vascular: No JVD.   Cardiovascular:      Rate and Rhythm: Normal rate and regular rhythm.      Heart sounds: Normal heart sounds. No murmur heard.  Pulmonary:      Effort: Pulmonary effort is normal. No respiratory distress.      Breath sounds: No wheezing or rales.      Comments: Decreased at bases  Abdominal:      General: Bowel sounds are normal. There is distension.      Palpations: Abdomen is soft.      Tenderness: There  "is no abdominal tenderness.   Musculoskeletal:         General: No swelling.      Cervical back: Normal range of motion and neck supple. No rigidity.      Right lower leg: Edema present.      Left lower leg: Edema present.   Skin:     General: Skin is warm.      Capillary Refill: Capillary refill takes less than 2 seconds.      Coloration: Skin is not pale.      Findings: No rash.   Neurological:      General: No focal deficit present.      Mental Status: She is alert and oriented to person, place, and time.      Cranial Nerves: No cranial nerve deficit.      Motor: Weakness present. No abnormal muscle tone.   Psychiatric:      Comments: Calm            ACUTE PHASE REACTANT (LAST 24 HOURS)  No results for input(s): "FERRITIN", "CRP", "LDH", "DDIMER" in the last 24 hours.    CBC LAST (LAST 24 HOURS)  Recent Labs   Lab 06/30/24  0320   WBC 15.29*   RBC 4.22   HGB 10.6*   HCT 36.8*   MCV 87   MCH 25.1*   MCHC 28.8*   RDW 20.1*      MPV 11.2   GRAN 75.2*  11.5*   LYMPH 12.8*  2.0   MONO 9.5  1.5*   BASO 0.03   NRBC 0       CHEMISTRY LAST (LAST 24 HOURS)  Recent Labs   Lab 06/30/24  0320      K 4.4      CO2 21*   ANIONGAP 11   BUN 41*   CREATININE 4.3*   *   CALCIUM 8.9   MG 2.2       COAGULATION LAST (LAST 24 HOURS)  No results for input(s): "LABPT", "INR", "APTT" in the last 24 hours.      CARDIAC PROFILE (LAST 24 HOURS)  Recent Labs   Lab 06/27/24  0045   *   TROPONINIHS 52.4*       LAST 7 DAYS MICROBIOLOGY   Microbiology Results (last 7 days)       Procedure Component Value Units Date/Time    IV catheter culture [[id number]] Collected: 06/29/24 2255    Order Status: Completed Specimen: Catheter Tip, Subclavian Updated: 06/30/24 0825     Aerobic Culture - Cath tip No growth    Narrative:      Left subclavian HD catheter    Blood culture [[id number]] Collected: 06/29/24 1607    Order Status: Completed Specimen: Blood Updated: 06/29/24 2317     Blood Culture, Routine No Growth to date "    Blood culture [[id number]] Collected: 06/29/24 1607    Order Status: Completed Specimen: Blood Updated: 06/29/24 2317     Blood Culture, Routine No Growth to date    Blood culture [[id number]] Collected: 06/24/24 2016    Order Status: Completed Specimen: Blood from Peripheral, Hand, Right Updated: 06/29/24 2232     Blood Culture, Routine No growth after 5 days.    Culture, Respiratory with Gram Stain [[id number]] Collected: 06/24/24 1848    Order Status: Completed Specimen: Respiratory from Sputum, Expectorated Updated: 06/26/24 0918     Respiratory Culture No normal respiratory lesley     Gram Stain (Respiratory) <10 epithelial cells per low power field.     Gram Stain (Respiratory) Few WBC's     Gram Stain (Respiratory) No organisms seen            MOST RECENT IMAGING  US Carotid Bilateral  Narrative: EXAMINATION:  US CAROTID BILATERAL    CLINICAL HISTORY:  cardiac arrest; coronary artery disease    TECHNIQUE:  Grayscale, color and spectral Doppler analysis was performed. Criteria for stenosis is based upon the Society of Radiologists in Ultrasound Consensus Conference, 2003 (Radiology, November 2003, 229, 340-346). This is in accordance with NASCET criteria.    FINDINGS:  Comparison to prior exams.  Grayscale images show diffuse hypoechoic carotid intimal hyperplasia.    Peak systolic velocity in the proximal right ICA is 48.1 cm/sec, and in the distal right ICA 76.8 cm/sec, with ICA/CCA ratio of 1.4.    Peak systolic velocity in the proximal left ICA is 39 cm/sec, and in the distal left ICA 40.3 cm/sec, with ICA/CCA ratio of 1.1.    The vertebral arteries are patent, with normal waveforms, and normal antegrade flow bilaterally.  Impression: 1. No findings of hemodynamically significant carotid arterial stenosis or vascular occlusion.    2. Patent vertebral arteries with antegrade flow bilaterally.    Electronically signed by: Niko Mendiola  Date:    06/29/2024  Time:    13:18  US Lower Extremity Veins  Bilateral  Narrative: EXAMINATION:  US LOWER EXTREMITY VEINS BILATERAL    CLINICAL HISTORY:  de oxygenation and swelling;    FINDINGS:  Grayscale, color and spectral Doppler analysis of the bilateral lower extremity deep venous system was performed. Comparison to prior exams.    There is normal compressibility, with normal flow by color and spectral Doppler analysis in the bilateral lower extremity deep venous system.  There is normal augmentation and no evidence of deep venous thrombosis.    There is a 50 x 28 x 24 mm fusiform, heterogeneously echoic complex cyst in the right popliteal fossa, with increased through transmission of sound, and no internal color Doppler vascular flow.  Impression: 1. Negative for lower extremity deep venous thrombosis.  2. Right popliteal fossa complex Baker's cyst.    Electronically signed by: Niko Mendiola  Date:    06/29/2024  Time:    13:15      CURRENT VISIT EKG  Results for orders placed or performed during the hospital encounter of 06/18/24   EKG 12-lead    Narrative    Ordered by an unspecified provider.       ECHOCARDIOGRAM RESULTS  Results for orders placed during the hospital encounter of 06/18/24    Echo    Interpretation Summary    Left Ventricle: The left ventricle is moderately dilated. Moderately increased wall thickness. There is moderate concentric hypertrophy. Moderate global hypokinesis present. There is mildly reduced systolic function with a visually estimated ejection fraction of 40 - 45%. Unable to assess diastolic function due to atrial fibrillation.    Right Ventricle: Normal right ventricular cavity size. Wall thickness is normal. Systolic function is borderline low.    Left Atrium: Left atrium is mildly dilated.    Right Atrium: Right atrium is mildly dilated.    Mitral Valve: There is mild regurgitation with a centrally directed jet.    Tricuspid Valve: There is mild regurgitation with a centrally directed jet.    Pulmonary Artery: There is moderate to  severe pulmonary hypertension. The estimated pulmonary artery systolic pressure is 56 mmHg.    IVC/SVC: Elevated venous pressure at 15 mmHg.        VENTILATOR INFORMATION              LAST ARTERIAL BLOOD GAS  ABG  Recent Labs   Lab 06/27/24  0539   PH 7.434   PO2 78*   PCO2 34.3*   HCO3 23.0*   BE -1       ASSESSMENT:   Cardiac arrest  Hyperkalemia   Hx of vascular disease   UTI  NICOLE and probable OHS   Chronic hypercapneic respiratory failure   7.   Ventricular tachycardia   8.  Thrombocytopenia     Still not clear on etiology of arrest but may be a NSTEMI precipitated by dialysis.  ECHO has evidence of reduced EF.     Neuro exam is promising today. She opens eyes and is moving extremities. Tolerating dialysis and is off of vasopressors.     Plan for extubation   PLAN:   - Continue broad spectrum antibiotics  - stable extubated   - Appreciate nephrology and cardiology consultants assistance   - Defer management of NSTEMI, AF and EF to cardiology  -  I am told that she may need AICD  - try to increase activity  - Remove antony   - high risk for decompensation      Kendall Brady MD  [facility] Pulmonary/Critical Care  06/30/2024

## 2024-07-01 PROBLEM — A41.9 SEVERE SEPSIS: Status: ACTIVE | Noted: 2024-07-01

## 2024-07-01 PROBLEM — R65.20 SEVERE SEPSIS: Status: ACTIVE | Noted: 2024-07-01

## 2024-07-01 PROBLEM — Z71.89 GOALS OF CARE, COUNSELING/DISCUSSION: Status: ACTIVE | Noted: 2024-07-01

## 2024-07-01 LAB
ANION GAP SERPL CALC-SCNC: 12 MMOL/L (ref 8–16)
BASOPHILS # BLD AUTO: 0.04 K/UL (ref 0–0.2)
BASOPHILS NFR BLD: 0.3 % (ref 0–1.9)
BUN SERPL-MCNC: 57 MG/DL (ref 8–23)
CALCIUM SERPL-MCNC: 8.6 MG/DL (ref 8.7–10.5)
CHLORIDE SERPL-SCNC: 105 MMOL/L (ref 95–110)
CO2 SERPL-SCNC: 20 MMOL/L (ref 23–29)
CREAT SERPL-MCNC: 5.6 MG/DL (ref 0.5–1.4)
DIFFERENTIAL METHOD BLD: ABNORMAL
EOSINOPHIL # BLD AUTO: 0.2 K/UL (ref 0–0.5)
EOSINOPHIL NFR BLD: 1.9 % (ref 0–8)
ERYTHROCYTE [DISTWIDTH] IN BLOOD BY AUTOMATED COUNT: 20.1 % (ref 11.5–14.5)
EST. GFR  (NO RACE VARIABLE): 7.6 ML/MIN/1.73 M^2
GLUCOSE SERPL-MCNC: 108 MG/DL (ref 70–110)
GLUCOSE SERPL-MCNC: 114 MG/DL (ref 70–110)
GLUCOSE SERPL-MCNC: 129 MG/DL (ref 70–110)
GLUCOSE SERPL-MCNC: 136 MG/DL (ref 70–110)
HCT VFR BLD AUTO: 36.1 % (ref 37–48.5)
HGB BLD-MCNC: 10.4 G/DL (ref 12–16)
IMM GRANULOCYTES # BLD AUTO: 0.04 K/UL (ref 0–0.04)
IMM GRANULOCYTES NFR BLD AUTO: 0.3 % (ref 0–0.5)
LYMPHOCYTES # BLD AUTO: 2.2 K/UL (ref 1–4.8)
LYMPHOCYTES NFR BLD: 16.9 % (ref 18–48)
MAGNESIUM SERPL-MCNC: 2.4 MG/DL (ref 1.6–2.6)
MCH RBC QN AUTO: 25.5 PG (ref 27–31)
MCHC RBC AUTO-ENTMCNC: 28.8 G/DL (ref 32–36)
MCV RBC AUTO: 89 FL (ref 82–98)
MONOCYTES # BLD AUTO: 1.7 K/UL (ref 0.3–1)
MONOCYTES NFR BLD: 13.3 % (ref 4–15)
NEUTROPHILS # BLD AUTO: 8.7 K/UL (ref 1.8–7.7)
NEUTROPHILS NFR BLD: 67.3 % (ref 38–73)
NRBC BLD-RTO: 0 /100 WBC
PLATELET # BLD AUTO: 196 K/UL (ref 150–450)
PMV BLD AUTO: 9.6 FL (ref 9.2–12.9)
POTASSIUM SERPL-SCNC: 4.4 MMOL/L (ref 3.5–5.1)
RBC # BLD AUTO: 4.08 M/UL (ref 4–5.4)
SODIUM SERPL-SCNC: 137 MMOL/L (ref 136–145)
VANCOMYCIN SERPL-MCNC: 11.4 UG/ML
WBC # BLD AUTO: 12.89 K/UL (ref 3.9–12.7)

## 2024-07-01 PROCEDURE — 97530 THERAPEUTIC ACTIVITIES: CPT

## 2024-07-01 PROCEDURE — 99223 1ST HOSP IP/OBS HIGH 75: CPT | Mod: ,,, | Performed by: INTERNAL MEDICINE

## 2024-07-01 PROCEDURE — 93010 ELECTROCARDIOGRAM REPORT: CPT | Mod: ,,, | Performed by: INTERNAL MEDICINE

## 2024-07-01 PROCEDURE — 97535 SELF CARE MNGMENT TRAINING: CPT

## 2024-07-01 PROCEDURE — 83735 ASSAY OF MAGNESIUM: CPT | Performed by: INTERNAL MEDICINE

## 2024-07-01 PROCEDURE — 25000003 PHARM REV CODE 250: Performed by: INTERNAL MEDICINE

## 2024-07-01 PROCEDURE — 36415 COLL VENOUS BLD VENIPUNCTURE: CPT | Performed by: INTERNAL MEDICINE

## 2024-07-01 PROCEDURE — 21000000 HC CCU ICU ROOM CHARGE

## 2024-07-01 PROCEDURE — 63600175 PHARM REV CODE 636 W HCPCS: Performed by: INTERNAL MEDICINE

## 2024-07-01 PROCEDURE — 80202 ASSAY OF VANCOMYCIN: CPT | Performed by: INTERNAL MEDICINE

## 2024-07-01 PROCEDURE — 63600175 PHARM REV CODE 636 W HCPCS: Mod: JZ,EC,JG | Performed by: INTERNAL MEDICINE

## 2024-07-01 PROCEDURE — 25000003 PHARM REV CODE 250: Performed by: NURSE PRACTITIONER

## 2024-07-01 PROCEDURE — 27000221 HC OXYGEN, UP TO 24 HOURS

## 2024-07-01 PROCEDURE — 93005 ELECTROCARDIOGRAM TRACING: CPT | Performed by: INTERNAL MEDICINE

## 2024-07-01 PROCEDURE — 82962 GLUCOSE BLOOD TEST: CPT

## 2024-07-01 PROCEDURE — 99900031 HC PATIENT EDUCATION (STAT)

## 2024-07-01 PROCEDURE — 94761 N-INVAS EAR/PLS OXIMETRY MLT: CPT

## 2024-07-01 PROCEDURE — 80048 BASIC METABOLIC PNL TOTAL CA: CPT | Performed by: INTERNAL MEDICINE

## 2024-07-01 PROCEDURE — 85025 COMPLETE CBC W/AUTO DIFF WBC: CPT | Performed by: INTERNAL MEDICINE

## 2024-07-01 PROCEDURE — 99291 CRITICAL CARE FIRST HOUR: CPT | Mod: ,,, | Performed by: STUDENT IN AN ORGANIZED HEALTH CARE EDUCATION/TRAINING PROGRAM

## 2024-07-01 RX ORDER — DILTIAZEM HYDROCHLORIDE 5 MG/ML
5 INJECTION INTRAVENOUS ONCE
Status: COMPLETED | OUTPATIENT
Start: 2024-07-01 | End: 2024-07-01

## 2024-07-01 RX ORDER — SOTALOL HYDROCHLORIDE 80 MG/1
40 TABLET ORAL ONCE
Status: DISCONTINUED | OUTPATIENT
Start: 2024-07-01 | End: 2024-07-01

## 2024-07-01 RX ORDER — METOPROLOL TARTRATE 25 MG/1
25 TABLET, FILM COATED ORAL 2 TIMES DAILY
Status: DISCONTINUED | OUTPATIENT
Start: 2024-07-01 | End: 2024-07-03 | Stop reason: HOSPADM

## 2024-07-01 RX ORDER — SOTALOL HYDROCHLORIDE 80 MG/1
80 TABLET ORAL 2 TIMES DAILY
Status: DISCONTINUED | OUTPATIENT
Start: 2024-07-01 | End: 2024-07-01

## 2024-07-01 RX ADMIN — CEFEPIME 2 G: 2 INJECTION, POWDER, FOR SOLUTION INTRAVENOUS at 09:07

## 2024-07-01 RX ADMIN — FUROSEMIDE 40 MG: 10 INJECTION, SOLUTION INTRAVENOUS at 08:07

## 2024-07-01 RX ADMIN — FUROSEMIDE 40 MG: 10 INJECTION, SOLUTION INTRAVENOUS at 09:07

## 2024-07-01 RX ADMIN — DILTIAZEM HYDROCHLORIDE 5 MG: 5 INJECTION, SOLUTION INTRAVENOUS at 03:07

## 2024-07-01 RX ADMIN — SOTALOL HYDROCHLORIDE 40 MG: 80 TABLET ORAL at 08:07

## 2024-07-01 RX ADMIN — FAMOTIDINE 20 MG: 20 TABLET ORAL at 08:07

## 2024-07-01 RX ADMIN — DEXTROSE MONOHYDRATE 5 MG/HR: 50 INJECTION, SOLUTION INTRAVENOUS at 03:07

## 2024-07-01 RX ADMIN — HEPARIN SODIUM 5000 UNITS: 5000 INJECTION, SOLUTION INTRAVENOUS; SUBCUTANEOUS at 06:07

## 2024-07-01 RX ADMIN — HEPARIN SODIUM 5000 UNITS: 5000 INJECTION, SOLUTION INTRAVENOUS; SUBCUTANEOUS at 02:07

## 2024-07-01 RX ADMIN — HEPARIN SODIUM 5000 UNITS: 5000 INJECTION, SOLUTION INTRAVENOUS; SUBCUTANEOUS at 09:07

## 2024-07-01 RX ADMIN — VANCOMYCIN HYDROCHLORIDE 500 MG: 500 INJECTION, POWDER, LYOPHILIZED, FOR SOLUTION INTRAVENOUS at 02:07

## 2024-07-01 RX ADMIN — EPOETIN ALFA-EPBX 7400 UNITS: 10000 INJECTION, SOLUTION INTRAVENOUS; SUBCUTANEOUS at 10:07

## 2024-07-01 NOTE — PROGRESS NOTES
[facility]  Department of Cardiology  Progress Note    PATIENT NAME: Juhi Taylor  MRN: [id number]  TODAY'S DATE: 07/01/2024  ADMIT DATE: 6/18/2024    SUBJECTIVE     PRINCIPLE PROBLEM: Chronic kidney disease (CKD), stage V    Interval history:  07/01/2024  Patient sitting on side of bed today, weak but alert; RVR on telemetry  Patient refused increased dose of sotalol and reportedly develops mouth ulcers from amiodarone      6/30/24  Patient is alert and awake no specific complaints at the present time.  She is currently undergoing venous survey of the lower extremities.    Patient went into atrial fibrillation with rapid ventricular rate.    Her metoprolol tartrate was increased to 25 mg p.o. q. 6 hours and after giving metoprolol her heart rate continues to remain elevated.  Her white count is elevated at 17.29     73F followed with Dr. Martinez from nephrology for advanced CKD stage 4-5 getting admitted with fluid overload. Pt had PD catheter insertion 2 weeks ago in preparation for initiation of dialysis, however had post-operative issues with increased leakage. Then her PD in initiation and training was delayed due to insurance issues. She has oliguria/increased weight gain in past 2 weeks, SOB on exertion and cough. Symptoms got worse and she came to ER and got admitted.   Code blue was called on June 24th evening, she was unresponsive. Chest compressions were already underway. Rhythm was consistent with PE a arrest initially, after giving epinephrine, the rhythm appeared to change to ventricular tachycardia. We cardioverted and patient achieved ROSC. Additionallly she received bicarbonate and glucose.     Review of patient's allergies indicates:   Allergen Reactions    Percocet [oxycodone-acetaminophen] Itching    Amiodarone analogues      Itching      Irbesartan Swelling    Januvia [sitagliptin]     Jardiance [empagliflozin]      Leg cramps    Lipitor [atorvastatin] Other (See Comments)      Severe leg pain    Linaclotide Other (See Comments) and Nausea And Vomiting     Does not remember    Lubiprostone Other (See Comments) and Palpitations     Does not remember       REVIEW OF SYSTEMS  Positive for activity change   CARDIOVASCULAR: No recent chest pain, palpitations, arm, neck, or jaw pain. Positive for leg swelling.    RESPIRATORY: Positive for cough and shortness of breath. Negative for apnea, choking, chest tightness, wheezing and stridor.   : No blood in the urine  GI: No Nausea, vomiting, constipation, diarrhea, blood, or reflux.  MUSCULOSKELETAL: No myalgias  NEURO: No lightheadedness or dizziness. Positive for weakness.   EYES: No Double vision, blurry, vision or headache     OBJECTIVE     VITAL SIGNS (Most Recent)  Temp: 97.8 °F (36.6 °C) (07/01/24 0800)  Pulse: (!) 115 (07/01/24 0900)  Resp: (!) 27 (07/01/24 0900)  BP: 108/67 (07/01/24 0800)  SpO2: (!) 93 % (07/01/24 0900)    VENTILATION STATUS  Resp: (!) 27 (07/01/24 0900)  SpO2: (!) 93 % (07/01/24 0900)           I & O (Last 24H):  Intake/Output Summary (Last 24 hours) at 7/1/2024 0947  Last data filed at 7/1/2024 0904  Gross per 24 hour   Intake 300 ml   Output --   Net 300 ml       WEIGHTS  Wt Readings from Last 1 Encounters:   06/29/24 0400 132.3 kg (291 lb 10.7 oz)   06/28/24 0400 133.4 kg (294 lb 1.5 oz)   06/22/24 0443 (!) 147.9 kg (326 lb 1 oz)   06/19/24 1927 (!) 147.3 kg (324 lb 11.8 oz)   06/18/24 1202 132.5 kg (292 lb)       PHYSICAL EXAM  NECK:  Thick circumference, no carotid bruit, no JVD  LUNGS:  Bilateral decreased breath sounds at the bases  CHEST WALL:  Bruising from CPR  HEART:  IRRR, tachycardic, systolic murmur audible.  ABDOMEN: soft, obese,  EXTREMITIES: bilateral ankle edema  NEURO: AAO X 3    SCHEDULED MEDS:   ceFEPime IV (PEDS and ADULTS)  2 g Intravenous Q24H    epoetin alaina-epbx  50 Units/kg Intravenous Every Mon, Wed, Fri    famotidine  20 mg Oral Daily    furosemide (LASIX) injection  40 mg Intravenous  Q12H    guaiFENesin 100 mg/5 ml  400 mg Oral BID    heparin (porcine)  5,000 Units Subcutaneous Q8H    sotaloL  40 mg Oral BID       CONTINUOUS INFUSIONS:    PRN MEDS:  Current Facility-Administered Medications:     acetaminophen, 650 mg, Oral, Q8H PRN    aluminum-magnesium hydroxide-simethicone, 30 mL, Oral, QID PRN    dextrose 50%, 12.5 g, Intravenous, PRN    dextrose 50%, 25 g, Intravenous, PRN    glucagon (human recombinant), 1 mg, Intramuscular, PRN    glucose, 24 g, Oral, PRN    melatonin, 6 mg, Oral, Nightly PRN    ondansetron, 4 mg, Intravenous, Q6H PRN    Pharmacy to dose Vancomycin consult, , , Once **AND** vancomycin - pharmacy to dose, , Intravenous, pharmacy to manage frequency    LABS AND DIAGNOSTICS     CBC LAST 3 DAYS  Recent Labs   Lab 06/29/24  0158 06/30/24  0320 07/01/24  0405   WBC 17.29* 15.29* 12.89*   RBC 4.43 4.22 4.08   HGB 11.3* 10.6* 10.4*   HCT 38.5 36.8* 36.1*   MCV 87 87 89   MCH 25.5* 25.1* 25.5*   MCHC 29.4* 28.8* 28.8*   RDW 20.1* 20.1* 20.1*    196 196   MPV 11.3 11.2 9.6   GRAN 83.4*  14.4* 75.2*  11.5* 67.3  8.7*   LYMPH 7.7*  1.3 12.8*  2.0 16.9*  2.2   MONO 7.4  1.3* 9.5  1.5* 13.3  1.7*   BASO 0.02 0.03 0.04   NRBC 0 0 0       COAGULATION LAST 3 DAYS  Recent Labs   Lab 06/24/24 2016   INR 1.1       CHEMISTRY LAST 3 DAYS  Recent Labs   Lab 06/26/24  1322 06/27/24  0045 06/27/24  0310 06/27/24  0415 06/27/24  0539 06/28/24  0541 06/29/24  0058 06/30/24  0320 07/01/24  0405   NA  --  134*  --   --   --  137 137 136 137   K  --  4.1  --   --   --  3.5 3.8 4.4 4.4   CL  --  102  --   --   --  104 104 104 105   CO2  --  22*  --   --   --  25 23 21* 20*   ANIONGAP  --  10  --   --   --  8 10 11 12   BUN  --  41*  --   --   --  30* 24* 41* 57*   CREATININE  --  3.5*  --   --   --  3.2* 3.0* 4.3* 5.6*   GLU  --  138*  --   --   --  91 121* 120* 114*   CALCIUM  --  8.6*  --   --   --  8.6* 8.7 8.9 8.6*   PHOS  --  3.7  --   --   --   --  4.2  --   --    PH 7.440  --    --  7.437 7.434  --   --   --   --    MG  --  2.0   < >  --   --  1.9 2.0 2.2 2.4   ALBUMIN  --  3.3*  --   --   --  2.9* 3.1*  --   --    BILITOT  --  1.0  --   --   --  1.0 1.1*  --   --    PROT  --  6.0  --   --   --  5.7* 6.1  --   --    ALKPHOS  --  90  --   --   --  86 96  --   --    ALT  --  5*  --   --   --  6* 7*  --   --    AST  --  25  --   --   --  17 17  --   --     < > = values in this interval not displayed.       CARDIAC PROFILE LAST 3 DAYS  Recent Labs   Lab 06/25/24  1308 06/26/24  0302 06/27/24  0045   CPK  --   --  267*   TROPONINIHS 76.1* 47.2* 52.4*       ENDOCRINE LAST 3 DAYS  Recent Labs   Lab 06/29/24  1607   PROCAL 0.845*       LAST ARTERIAL BLOOD GAS  ABG  Recent Labs   Lab 06/27/24  0539   PH 7.434   PO2 78*   PCO2 34.3*   HCO3 23.0*   BE -1       LAST 7 DAYS MICROBIOLOGY   Microbiology Results (last 7 days)       Procedure Component Value Units Date/Time    IV catheter culture [[id number]] Collected: 06/29/24 2255    Order Status: Completed Specimen: Catheter Tip, Subclavian Updated: 07/01/24 0734     Aerobic Culture - Cath tip No growth    Narrative:      Left subclavian HD catheter    Blood culture [[id number]] Collected: 06/29/24 1607    Order Status: Completed Specimen: Blood Updated: 06/30/24 1832     Blood Culture, Routine No Growth to date      No Growth to date    Blood culture [[id number]] Collected: 06/29/24 1607    Order Status: Completed Specimen: Blood Updated: 06/30/24 1832     Blood Culture, Routine No Growth to date      No Growth to date    Blood culture [[id number]] Collected: 06/24/24 2016    Order Status: Completed Specimen: Blood from Peripheral, Hand, Right Updated: 06/29/24 2232     Blood Culture, Routine No growth after 5 days.    Culture, Respiratory with Gram Stain [[id number]] Collected: 06/24/24 1848    Order Status: Completed Specimen: Respiratory from Sputum, Expectorated Updated: 06/26/24 0918     Respiratory Culture No normal respiratory lesley     Gram  Stain (Respiratory) <10 epithelial cells per low power field.     Gram Stain (Respiratory) Few WBC's     Gram Stain (Respiratory) No organisms seen            MOST RECENT IMAGING  US Carotid Bilateral  Narrative: EXAMINATION:  US CAROTID BILATERAL    CLINICAL HISTORY:  cardiac arrest; coronary artery disease    TECHNIQUE:  Grayscale, color and spectral Doppler analysis was performed. Criteria for stenosis is based upon the Society of Radiologists in Ultrasound Consensus Conference, 2003 (Radiology, November 2003, 229, 340-346). This is in accordance with NASCET criteria.    FINDINGS:  Comparison to prior exams.  Grayscale images show diffuse hypoechoic carotid intimal hyperplasia.    Peak systolic velocity in the proximal right ICA is 48.1 cm/sec, and in the distal right ICA 76.8 cm/sec, with ICA/CCA ratio of 1.4.    Peak systolic velocity in the proximal left ICA is 39 cm/sec, and in the distal left ICA 40.3 cm/sec, with ICA/CCA ratio of 1.1.    The vertebral arteries are patent, with normal waveforms, and normal antegrade flow bilaterally.  Impression: 1. No findings of hemodynamically significant carotid arterial stenosis or vascular occlusion.    2. Patent vertebral arteries with antegrade flow bilaterally.    Electronically signed by: Niko Mendiola  Date:    06/29/2024  Time:    13:18  US Lower Extremity Veins Bilateral  Narrative: EXAMINATION:  US LOWER EXTREMITY VEINS BILATERAL    CLINICAL HISTORY:  de oxygenation and swelling;    FINDINGS:  Grayscale, color and spectral Doppler analysis of the bilateral lower extremity deep venous system was performed. Comparison to prior exams.    There is normal compressibility, with normal flow by color and spectral Doppler analysis in the bilateral lower extremity deep venous system.  There is normal augmentation and no evidence of deep venous thrombosis.    There is a 50 x 28 x 24 mm fusiform, heterogeneously echoic complex cyst in the right popliteal fossa, with  increased through transmission of sound, and no internal color Doppler vascular flow.  Impression: 1. Negative for lower extremity deep venous thrombosis.  2. Right popliteal fossa complex Baker's cyst.    Electronically signed by: Niko Mendiola  Date:    06/29/2024  Time:    13:15      LASTStrawn  Results for orders placed during the hospital encounter of 06/18/24    Echo    Interpretation Summary    Left Ventricle: The left ventricle is normal in size. Normal wall thickness. Severe global hypokinesis present. There is severely reduced systolic function with a visually estimated ejection fraction of 20 - 25%. Grade III diastolic dysfunction.    Right Ventricle: Mild right ventricular enlargement. Wall thickness is normal. Right ventricle wall motion has global hypokinesis. Systolic function is moderately reduced.    Left Atrium: Left atrium is moderately dilated.    Tricuspid Valve: There is moderate regurgitation with an anteromedial eccentrically directed jet.    Overall the study quality was technically difficult. The study was difficult due to patient's clinical status and body habitus.      CURRENT/PREVIOUS VISIT EKG  Results for orders placed or performed during the hospital encounter of 06/18/24   EKG 12-lead    Collection Time: 06/30/24 11:17 PM   Result Value Ref Range    QRS Duration 140 ms    OHS QTC Calculation 514 ms    Narrative    Test Reason : I48.91,I48.92,    Vent. Rate : 121 BPM     Atrial Rate : 073 BPM     P-R Int : 000 ms          QRS Dur : 140 ms      QT Int : 362 ms       P-R-T Axes : 000 -33 153 degrees     QTc Int : 514 ms    Atrial fibrillation with rapid ventricular response with premature  ventricular or aberrantly conducted complexes  Left axis deviation  LVH with QRS widening  T wave abnormality, consider lateral ischemia  Abnormal ECG  When compared with ECG of 30-JUN-2024 18:24,  Left bundle branch block is no longer Present    Referred By: [email or web address]   SELF           Confirmed By:         ASSESSMENT/PLAN:     Active Hospital Problems    Diagnosis    *Chronic kidney disease (CKD), stage V    Cardiac arrest    CHF (congestive heart failure)    Acute hypoxic respiratory failure    Morbid obesity    Renal cell carcinoma of right kidney    Paroxysmal atrial fibrillation    Type 2 diabetes mellitus with hyperglycemia    Coronary artery disease    Essential hypertension       Summary    Procedures performed:  Ultrasound guided right brachial vein (due to no venous access) and right brachial artery access  Coronary angiography  Angioplasty stenting of the ostial circumflex and mid circumflex  Angioplasty stenting of the mid LAD  Abdominal/nonselective renal artery angiogram bilateral     Angiographic findings:  Left main coronary-large vessel normal appearance.  Left anterior descending-normal size vessel diffuse disease proximal mid with up to 85% stenosis just after 1st diagonal.  First diagonal normal size long vessel diffuse mild disease less than 35%.  Mid LAD has less than 35% stenosis.  Circumflex-large vessel with the ostial 95% stenosis followed by an 85% stenosis in the mid circumflex.  First obtuse marginal large branching vessel diffuse mild disease less than 35%.  Remainder circumflex has less than 35% stenosis.  Right coronary artery-large dominant vessel diffuse disease seen throughout entire course of up to 35% proximally 50% distally  Bilateral renal arteries are normal size normal appearance     Intervention:  Successful angioplasty and stenting of the mid circumflex using a Synergy 3.0 x 38 post dilated to 3.50% residual stenosis and YANELY 3 flow.  Successful angioplasty and stenting of the ostial circumflex using a Synergy 4.5 x 16 post dilated to 475 0% restenosis and YANELY 3 flow.  Successful angioplasty stenting of the LAD using a Synergy 3.0 x 32 post dilated to 3.5 was 0% restenosis and YANELY 3 flow.     Impression:  Critical stenosis of the ostial circumflex, severe stenosis of  mid circumflex and mid LAD with successful angioplasty stenting as above  Normal bilateral renal arteries.     Plan:  Dual antiplatelet therapy minimum 1 year aggressive risk factor modification  IV hydration today 1 dose of Lasix this afternoon continue current medications titrate up her blood pressure control.         ASSESSMENT & PLAN:   Cardiac arrest  Ventricular tachycardia  Left bundle branch block  Cardiomyopathy  Acute hypoxic respiratory failure  CAD  H/O PCI x3 in 2020  Hypertension  Congestive heart failure  Paroxysmal atrial fibrillation  Diabetes mellitus 2  CKD stage 5/ESRD  Morbid Obesity  Renal cell carcinoma of right kidney -right nephrectomy  Thrombocytopenia       RECOMMENDATIONS:  -Patient is status post PEA/V-tach arrest on 06/24/2024    1. Patient remains in atrial fibrillation with RVR   2. Heparin q.8 hours was started for anticoagulation   3. Patient has Renal failure and on hemodialysis.    4. Patient had PCI in 2020.  Given recent cardiac arrest, recommend angiogram for evaluation of coronaries.  Patient wants to have this done at [facility].  Hospitalist is working on transfer there  5. Yesterday, patient was started on sotalol 40 mg p.o. b.i.d. and metoprolol held; repeat EKG this morning showed normal QTC therefore sotalol was increased to 80 mg b.i.d.  Patient then told nurse she did not want to take sotalol anymore as she read about bad side effects with the medication  6. Dr. Washington wants to try IV Cardizem for RVR.  Ordered 5 mg Cardizem IV push x1 followed by 5 mg per hour drip to be started  7. Stop sotalol and resume back on metoprolol tartrate 25 mg b.i.d.  8. Patient has reported amiodarone allergy          Ramila Lucia NP  [facility]  Department of Cardiology       I have personally interviewed and examined the patient, I have reviewed the Nurse Practitioner's history and physical, assessment, and plan. I have personally evaluated the patient at  bedside and agree with the findings and made appropriate changes as necessary in recommendations.      Dr. Padmini MD  Department of Cardiology  [facility]  Date of Service: 07/01/2024       Patient has a history of stents, atrial fibrillation, peritoneal dialysis    EKG June 18th in the emergency room revealed atrial fibrillation rapid response.   Tunnel catheter was placed on June 22nd  She had a cardiac arrest with PEA noted during dialysis.  06/24/2024     EKG June 24th revealed sinus rhythm AFTER CARDIOVERSION AND CPR but she is back in atrial fibrillation rapid response.    She is refused amiodarone secondary to various intolerances but there is no reporting of any true allergy to the amiodarone  She was started on metoprolol and did not tolerate was changed to sotalol which she refused    EF REVEALS EF OF 20 25%    SHE REMAINS IN ATRIAL FIBRILLATION RAPID RESPONSE WITH A LOW BLOOD PRESSURE    She is requested transfer to higher level of care and we are awaiting transfer to the [facility] [place]    She remains at high risk for cardiac deterioration of the heart rate is not controlled which I discussed with the patient    Recommend amiodarone drip as he was no allergy to amiodarone only intolerance during my discussion with the patient it may save her life in the meantime    Her blood pressure is too low to tolerate the metoprolol so we can try low-dose Cardizem drip for rate control.

## 2024-07-01 NOTE — ASSESSMENT & PLAN NOTE
Patient has severe fluid overload secondary to ESRD, cardiac arrest in HD hemodialysis  Likely multifactorial from CHF with worsening CKD  EF 40-45%  in previous ECHO but repeat with 25% with elevated BNP  Status post tunnel line 6/22 and removed 6/29 because of superficial infection   Intubated with cardiac arrest 6/24 and extubated 6/27  HD as per nephrology   Wean oxygen   Cont IV Lasix 40 mg BID

## 2024-07-01 NOTE — ASSESSMENT & PLAN NOTE
Stable  Continue with medical management  + troponin . Unsure arrest secondary to cardiac cause . H/o a fib noted  Patient received heparin, now off anticoagulation due to thrombocytopenia   Amiodarone patient has allergy  On sotalol per cardiology   Patient has been going back and forth for angiogram. Now stating they want to be transferred.

## 2024-07-01 NOTE — ASSESSMENT & PLAN NOTE
Chronic, controlled. Latest blood pressure and vitals reviewed-     Home meds for hypertension were reviewed and noted below.   Hypertension Medications               carvediloL (COREG) 25 MG tablet Take 1 tablet (25 mg total) by mouth 2 (two) times daily.    cloNIDine (CATAPRES) 0.1 MG tablet Take 1 tablet (0.1 mg total) by mouth 3 (three) times daily as needed (PRN SBP > 165 mmHg).    cloNIDine 0.1 mg/24 hr td ptwk (CATAPRES) 0.1 mg/24 hr Place 1 patch onto the skin every 7 days.    furosemide (LASIX) 40 MG tablet Take 40 mg by mouth once daily.    amLODIPine (NORVASC) 10 MG tablet Take 10 mg by mouth once daily.    nitroGLYCERIN (NITROSTAT) 0.4 MG SL tablet Place 1 tablet (0.4 mg total) under the tongue every 5 (five) minutes as needed for Chest pain.          Currently on IV Lasix and Sotalol

## 2024-07-01 NOTE — NURSING
Family approached the nurses station and asked if pt could eat peanut M&M's. After reviewing diet order, the M&M's the pt was requesting to eat would not be in accordance with it. I instructed family to refrain from allowing her to eat it due to her order and potential choking risk. Approx 20 minutes later, family reported that the pt needed to urinate. I retrieved purewick supplies to place for pt while her nurse was with another pt. On walking into room, family exited. It was noted that pt was coughing incessantly, had an M&M on her chest, and when asked if she ate them, she stated she did. Pt continued to cough for several minutes. Sats did not fall below 94% during event. Pt was educated again on parameters of her diet and risks of not adhering to the diet. Family needs to be re-educated on return.
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
## 2024-07-01 NOTE — ASSESSMENT & PLAN NOTE
Patient's FSGs are controlled on current medication regimen.  Last A1c reviewed-   Lab Results   Component Value Date    HGBA1C 6.8 (H) 06/19/2024       Hold Oral hypoglycemics while patient is in the hospital.

## 2024-07-01 NOTE — ASSESSMENT & PLAN NOTE
Patient had PD cath placed 2 weeks ago however due to insurance situation never received dialysis  Tunneled dialysis catheter was placed 6/22  Revision 6/23  Patient has persistent oozing from dialysis catheter  and DDAVP given and now stopped   HD as per nephrology   Tunnel catheter needed to removed on 6/29 because of possible infection . Cultures neg  - follow plans from nephrology for further dialysis

## 2024-07-01 NOTE — NURSING
Pt. Refused to take sotalol even after I explained that she had taken the same thing this morning and last night. 's Afib, resting. Notified Ramila wei np. No new orders at  this time.

## 2024-07-01 NOTE — PT/OT/SLP PROGRESS
Occupational Therapy   Treatment    Name: Juhi Taylor  MRN: [id number]  Admitting Diagnosis:  Chronic kidney disease (CKD), stage V  8 Days Post-Op    Recommendations:     Discharge Recommendations: High Intensity Therapy  Discharge Equipment Recommendations:  3-in-1 commode, grab bar, other (see comments), rollator (reacher and dressing aids)  Barriers to discharge:   (Increased physical assistanc with ADLs and functional mobility.)    Assessment:     Juhi Taylor is a 74 y.o. female with a medical diagnosis of Chronic kidney disease (CKD), stage V.  She presents with improving medical acuity and functional mobility. Patient participated in bed mobility, unsupported sitting EOB, LB dressing sitting EOB, functional transfer and ambulation using rolling walker. Performance deficits affecting function are weakness, impaired endurance, impaired self care skills, impaired functional mobility, gait instability, impaired balance, decreased safety awareness, decreased lower extremity function, decreased upper extremity function, impaired cardiopulmonary response to activity.     Rehab Prognosis:  Fair; patient would benefit from acute skilled OT services to address these deficits and reach maximum level of function.       Plan:     Patient to be seen 5 x/week to address the above listed problems via self-care/home management, therapeutic activities, therapeutic exercises  Plan of Care Expires: 07/29/24  Plan of Care Reviewed with: daughter, patient    Subjective     Chief Complaint: General weakness  Patient/Family Comments/goals: Improved functional mobility and ADL independence.  Pain/Comfort:  Pain Rating 1: 0/10  Pain Rating Post-Intervention 1: 0/10    Objective:     Communicated with: nurse prior to session.  Patient found HOB elevated with telemetry, peripheral IV, pulse ox (continuous), oxygen upon OT entry to room.    General Precautions: Standard, fall    Orthopedic Precautions:N/A  Braces:  N/A  Respiratory Status: Nasal cannula, flow 2 L/min     Occupational Performance:     Bed Mobility:    Patient completed Scooting/Bridging with contact guard assistance  Patient completed Supine to Sit with contact guard assistance  Patient completed Sit to Supine with contact guard assistance   Performed unsupported sitting EOB with contact guard assistance.    Functional Mobility/Transfers:  Patient completed Sit <> Stand Transfer with minimum assistance  with  rolling walker   Functional Mobility: ambulated 5 feet forwards/backwards with minimal assistance using rolling walker.    Activities of Daily Living:  Lower Body Dressing: minimum assistance to don/doff socks sitting EOB.      [facility] 6 Click ADL:      Treatment & Education:  Patient required increased time for bed mobility, LB dressing, functional transfer and ambulation during session.    Patient left HOB elevated with all lines intact, call button in reach, and bed alarm on    GOALS:   Multidisciplinary Problems       Occupational Therapy Goals          Problem: Occupational Therapy    Goal Priority Disciplines Outcome Interventions   Occupational Therapy Goal     OT, PT/OT     Description: Goals to be met by: 7/20/2024     Patient will increase functional independence with ADLs by performing:    UE Dressing with Supervision.  LE Dressing with Mod A.  Grooming while seated min A.   Toileting from toilet with mod A for hygiene and clothing management.   Patient will tolerance 2 minutes of therapeutic activities displaying fair endurance for improved independence during ADLs.                          Time Tracking:     OT Date of Treatment: 07/01/24  OT Start Time: 0953  OT Stop Time: 1028  OT Total Time (min): 35 min    Billable Minutes:Self Care/Home Management 16  Therapeutic Activity 19    OT/DAYSI: OT     Number of DAYSI visits since last OT visit: 0    7/1/2024
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
## 2024-07-01 NOTE — PROGRESS NOTES
Pulmonary/Critical Care   Progress Note      PATIENT NAME: Juhi Taylor  MRN: [id number]  TODAY'S DATE: 2024  5:28 PM  ADMIT DATE: 2024  AGE: 74 y.o. : 1950    CONSULT REQUESTED BY: Manuel Christensen MD    REASON FOR CONSULT:   Cardiac arrest     HPI:  Ms Taylor is a 73-year-old woman who presents with cardiac arrest.    Code blue was called on  evening, she was unresponsive.  Chest compressions were already underway.  Rhythm was consistent with PE a arrest initially, after giving epinephrine, the rhythm appeared to change to ventricular tachycardia.  We cardioverted and patient achieved ROSC.  Additionallly she received bicarbonate and glucose.     Today:   Did well with SBT- tolerating dialysis.     2024 0 Stable overnight, has been tolerating dialysis.  Her mentation is cloudy.  No respiratory complaints and she remains stable extubated. CXR with CM, better edema, ECHO noted with EF 25%    2024 - STable overnight, no new respiratory complaints reported.  Back in AF with increased HR (being addressed), LE dopplers negative for DVT    2024 - STable overnight, no new respiratory complaints.  AF is still and issue and HR is increased.  No new respiratory complaints but does have some congestion    24- doing well, Trialysis catheter was removed, she is awake alert, afebrile,     Review of Systems   Constitutional:  Positive for activity change. Negative for appetite change, chills, fatigue and fever.   HENT:  Positive for congestion. Negative for hearing loss, nosebleeds, postnasal drip, sneezing and sore throat.    Respiratory:  Positive for cough and shortness of breath. Negative for apnea, choking, chest tightness, wheezing and stridor.    Cardiovascular:  Positive for leg swelling. Negative for chest pain and palpitations.   Gastrointestinal:  Negative for abdominal distention, abdominal pain, constipation, diarrhea and nausea.   Genitourinary:  Negative for  dysuria, frequency and urgency.   Musculoskeletal:  Negative for arthralgias and myalgias.   Neurological:  Positive for weakness. Negative for syncope and numbness.   Hematological:  Negative for adenopathy.   Psychiatric/Behavioral:  Negative for dysphoric mood. The patient is not nervous/anxious.            ALLERGIES  Review of patient's allergies indicates:   Allergen Reactions    Percocet [oxycodone-acetaminophen] Itching    Amiodarone analogues      Itching      Irbesartan Swelling    Januvia [sitagliptin]     Jardiance [empagliflozin]      Leg cramps    Lipitor [atorvastatin] Other (See Comments)     Severe leg pain    Linaclotide Other (See Comments) and Nausea And Vomiting     Does not remember    Lubiprostone Other (See Comments) and Palpitations     Does not remember       INPATIENT SCHEDULED MEDICATIONS   ceFEPime IV (PEDS and ADULTS)  2 g Intravenous Q24H    epoetin alaina-epbx  50 Units/kg Intravenous Every Mon, Wed, Fri    famotidine  20 mg Oral Daily    furosemide (LASIX) injection  40 mg Intravenous Q12H    guaiFENesin 100 mg/5 ml  400 mg Oral BID    heparin (porcine)  5,000 Units Subcutaneous Q8H    sotaloL  40 mg Oral BID           MEDICAL AND SURGICAL HISTORY  Past Medical History:   Diagnosis Date    Anticoagulant long-term use     Arthritis     Breast cancer 2014    invasive lobular carcinoma    Cancer of kidney 11/2020    RIGHT KIDNEY CANCER    CHF (congestive heart failure)     Coronary artery disease dx 2005    Depression     Diabetes mellitus     Diastolic heart failure secondary to hypertension     Gout     Hyperlipemia     Hypertension     Hypertrophy of nasal turbinates     Kidney mass 2020    Right    Levoscoliosis     Lung nodule     left    Multiple thyroid nodules     NICOLE (obstructive sleep apnea)     uses C-PAP    Pulmonary hypertension      Past Surgical History:   Procedure Laterality Date    AORTOGRAPHY N/A 12/04/2020    Procedure: Aortogram;  Surgeon: Paul Pedersen MD;  Location:  "[facility] CATH;  Service: Cardiology;  Laterality: N/A;    BREAST SURGERY      CARDIAC CATHETERIZATION  2020    CHOLECYSTECTOMY      COLONOSCOPY      multi -last      CORONARY ARTERY BYPASS GRAFT      ESOPHAGOGASTRODUODENOSCOPY      2012     HAND SURGERY Right     INSERTION OF CATHETER N/A 2024    Procedure: Insertion,catheter;  Surgeon: Meli Griffin MD;  Location: [facility] OR;  Service: Vascular;  Laterality: N/A;  ORIGINAL CATHETER WAS REPOSITIONED.  NO NEW CATHETER WAS PLACED    INSERTION, CATHETER, DIALYSIS, PERITONEAL N/A 2024    Procedure: IN Insertion Catheter, Dialysis, Peritoneal - laparoscopic;  Surgeon: Rodriguez Catalan Jr., MD;  Location: [facility] OR;  Service: General;  Laterality: N/A;    LAPAROSCOPIC ROBOT-ASSISTED SURGICAL REMOVAL OF KIDNEY USING DA CHELLE XI Right 03/10/2022    Procedure: XI ROBOTIC NEPHRECTOMY- radical;  Surgeon: Rolando Ramirez MD;  Location: [facility] OR;  Service: Urology;  Laterality: Right;    MASTECTOMY W/ SENTINEL NODE BIOPSY Bilateral 2015    bilateral "dog ears"    NASAL SINUS SURGERY      Dr Bryant FESS/cauterization turbinate     PARTIAL HYSTERECTOMY      PERCUTANEOUS TRANSLUMINAL BALLOON ANGIOPLASTY OF CORONARY ARTERY  2020    Procedure: Angioplasty-coronary;  Surgeon: Paul Pedersen MD;  Location: [facility] CATH;  Service: Cardiology;;    RENAL BIOPSY Right     2021 EJ    TUBAL LIGATION      ULTRASOUND GUIDANCE  2020    Procedure: Ultrasound Guidance;  Surgeon: Paul Pedersen MD;  Location: [facility] CATH;  Service: Cardiology;;       ALCOHOL, TOBACCO AND DRUG USE  Social History     Tobacco Use   Smoking Status Former    Current packs/day: 0.00    Types: Cigarettes    Start date: 2016    Quit date: 2016    Years since quittin.5   Smokeless Tobacco Never   Tobacco Comments    quit      Social History     Substance and Sexual Activity   Alcohol Use No     Social History     Substance and Sexual Activity   Drug Use " No       FAMILY HISTORY  Family History   Problem Relation Name Age of Onset    Breast cancer Mother      Stroke Father Waqar Thompson.     Hypertension Father Waqar Thompson.     Hepatitis Brother      Asthma Daughter Nell Taylor     Birth defects Daughter Nell Camejo's two children has cleft lips    Depression Daughter Nell Taylor     Drug abuse Daughter Nell Taylor     Learning disabilities Bam Taylor     Mental illness Bam Taylor     Breast cancer Maternal Aunt      Glaucoma Sister      Drug abuse Daughter Nell     Macular degeneration Neg Hx      Retinal detachment Neg Hx         VITAL SIGNS (MOST RECENT)  Temp: 97.8 °F (36.6 °C) (07/01/24 0800)  Pulse: (!) 115 (07/01/24 0900)  Resp: (!) 27 (07/01/24 0900)  BP: 108/67 (07/01/24 0800)  SpO2: (!) 93 % (07/01/24 0900)    INTAKE AND OUTPUT (LAST 24 HOURS):  Intake/Output Summary (Last 24 hours) at 7/1/2024 0921  Last data filed at 7/1/2024 0904  Gross per 24 hour   Intake 300 ml   Output --   Net 300 ml       WEIGHT  Wt Readings from Last 1 Encounters:   06/29/24 132.3 kg (291 lb 10.7 oz)       Physical Exam  Vitals reviewed.   Constitutional:       General: She is not in acute distress.     Appearance: She is well-developed. She is obese. She is ill-appearing (chronic). She is not toxic-appearing or diaphoretic.   HENT:      Head: Normocephalic and atraumatic.      Mouth/Throat:      Pharynx: No oropharyngeal exudate or posterior oropharyngeal erythema.   Eyes:      General: No scleral icterus.     Pupils: Pupils are equal, round, and reactive to light.   Neck:      Vascular: No JVD.   Cardiovascular:      Rate and Rhythm: Normal rate and regular rhythm.      Heart sounds: Normal heart sounds. No murmur heard.  Pulmonary:      Effort: Pulmonary effort is normal. No respiratory distress.      Breath sounds: No wheezing or rales.      Comments: Decreased at bases  Abdominal:      General: Bowel sounds are normal.  "There is distension.      Palpations: Abdomen is soft.      Tenderness: There is no abdominal tenderness.   Musculoskeletal:         General: No swelling.      Cervical back: Normal range of motion and neck supple. No rigidity.      Right lower leg: Edema present.      Left lower leg: Edema present.   Skin:     General: Skin is warm.      Capillary Refill: Capillary refill takes less than 2 seconds.      Coloration: Skin is not pale.      Findings: No rash.   Neurological:      General: No focal deficit present.      Mental Status: She is alert and oriented to person, place, and time.      Cranial Nerves: No cranial nerve deficit.      Motor: Weakness present. No abnormal muscle tone.   Psychiatric:      Comments: Calm            ACUTE PHASE REACTANT (LAST 24 HOURS)  No results for input(s): "FERRITIN", "CRP", "LDH", "DDIMER" in the last 24 hours.    CBC LAST (LAST 24 HOURS)  Recent Labs   Lab 07/01/24  0405   WBC 12.89*   RBC 4.08   HGB 10.4*   HCT 36.1*   MCV 89   MCH 25.5*   MCHC 28.8*   RDW 20.1*      MPV 9.6   GRAN 67.3  8.7*   LYMPH 16.9*  2.2   MONO 13.3  1.7*   BASO 0.04   NRBC 0       CHEMISTRY LAST (LAST 24 HOURS)  Recent Labs   Lab 07/01/24  0405      K 4.4      CO2 20*   ANIONGAP 12   BUN 57*   CREATININE 5.6*   *   CALCIUM 8.6*   MG 2.4       COAGULATION LAST (LAST 24 HOURS)  No results for input(s): "LABPT", "INR", "APTT" in the last 24 hours.      CARDIAC PROFILE (LAST 24 HOURS)  Recent Labs   Lab 06/27/24  0045   *   TROPONINIHS 52.4*       LAST 7 DAYS MICROBIOLOGY   Microbiology Results (last 7 days)       Procedure Component Value Units Date/Time    IV catheter culture [[id number]] Collected: 06/29/24 2255    Order Status: Completed Specimen: Catheter Tip, Subclavian Updated: 07/01/24 0734     Aerobic Culture - Cath tip No growth    Narrative:      Left subclavian HD catheter    Blood culture [[id number]] Collected: 06/29/24 1607    Order Status: Completed " Specimen: Blood Updated: 06/30/24 1832     Blood Culture, Routine No Growth to date      No Growth to date    Blood culture [[id number]] Collected: 06/29/24 1607    Order Status: Completed Specimen: Blood Updated: 06/30/24 1832     Blood Culture, Routine No Growth to date      No Growth to date    Blood culture [[id number]] Collected: 06/24/24 2016    Order Status: Completed Specimen: Blood from Peripheral, Hand, Right Updated: 06/29/24 2232     Blood Culture, Routine No growth after 5 days.    Culture, Respiratory with Gram Stain [[id number]] Collected: 06/24/24 1848    Order Status: Completed Specimen: Respiratory from Sputum, Expectorated Updated: 06/26/24 0918     Respiratory Culture No normal respiratory lesley     Gram Stain (Respiratory) <10 epithelial cells per low power field.     Gram Stain (Respiratory) Few WBC's     Gram Stain (Respiratory) No organisms seen            MOST RECENT IMAGING  US Carotid Bilateral  Narrative: EXAMINATION:  US CAROTID BILATERAL    CLINICAL HISTORY:  cardiac arrest; coronary artery disease    TECHNIQUE:  Grayscale, color and spectral Doppler analysis was performed. Criteria for stenosis is based upon the Society of Radiologists in Ultrasound Consensus Conference, 2003 (Radiology, November 2003, 229, 340-346). This is in accordance with NASCET criteria.    FINDINGS:  Comparison to prior exams.  Grayscale images show diffuse hypoechoic carotid intimal hyperplasia.    Peak systolic velocity in the proximal right ICA is 48.1 cm/sec, and in the distal right ICA 76.8 cm/sec, with ICA/CCA ratio of 1.4.    Peak systolic velocity in the proximal left ICA is 39 cm/sec, and in the distal left ICA 40.3 cm/sec, with ICA/CCA ratio of 1.1.    The vertebral arteries are patent, with normal waveforms, and normal antegrade flow bilaterally.  Impression: 1. No findings of hemodynamically significant carotid arterial stenosis or vascular occlusion.    2. Patent vertebral arteries with antegrade  flow bilaterally.    Electronically signed by: Niko Mendiola  Date:    06/29/2024  Time:    13:18  US Lower Extremity Veins Bilateral  Narrative: EXAMINATION:  US LOWER EXTREMITY VEINS BILATERAL    CLINICAL HISTORY:  de oxygenation and swelling;    FINDINGS:  Grayscale, color and spectral Doppler analysis of the bilateral lower extremity deep venous system was performed. Comparison to prior exams.    There is normal compressibility, with normal flow by color and spectral Doppler analysis in the bilateral lower extremity deep venous system.  There is normal augmentation and no evidence of deep venous thrombosis.    There is a 50 x 28 x 24 mm fusiform, heterogeneously echoic complex cyst in the right popliteal fossa, with increased through transmission of sound, and no internal color Doppler vascular flow.  Impression: 1. Negative for lower extremity deep venous thrombosis.  2. Right popliteal fossa complex Baker's cyst.    Electronically signed by: Niko Mendiola  Date:    06/29/2024  Time:    13:15      CURRENT VISIT EKG  Results for orders placed or performed during the hospital encounter of 06/18/24   EKG 12-lead    Narrative    Ordered by an unspecified provider.       ECHOCARDIOGRAM RESULTS  Results for orders placed during the hospital encounter of 06/18/24    Echo    Interpretation Summary    Left Ventricle: The left ventricle is moderately dilated. Moderately increased wall thickness. There is moderate concentric hypertrophy. Moderate global hypokinesis present. There is mildly reduced systolic function with a visually estimated ejection fraction of 40 - 45%. Unable to assess diastolic function due to atrial fibrillation.    Right Ventricle: Normal right ventricular cavity size. Wall thickness is normal. Systolic function is borderline low.    Left Atrium: Left atrium is mildly dilated.    Right Atrium: Right atrium is mildly dilated.    Mitral Valve: There is mild regurgitation with a centrally directed jet.     Tricuspid Valve: There is mild regurgitation with a centrally directed jet.    Pulmonary Artery: There is moderate to severe pulmonary hypertension. The estimated pulmonary artery systolic pressure is 56 mmHg.    IVC/SVC: Elevated venous pressure at 15 mmHg.        VENTILATOR INFORMATION              LAST ARTERIAL BLOOD GAS  ABG  Recent Labs   Lab 06/27/24  0539   PH 7.434   PO2 78*   PCO2 34.3*   HCO3 23.0*   BE -1       ASSESSMENT:   Cardiac arrest  Hyperkalemia   Hx of vascular disease   UTI  NICOLE and probable OHS   Chronic hypercapneic respiratory failure   7.   Ventricular tachycardia   8.  Thrombocytopenia     Still not clear on etiology of arrest but may be a NSTEMI precipitated by sepsis and shock due to suspected transient bacteremia related to trialysis catheter or potentially cardiac arrest triggered by dialysis.  ECHO has evidence of reduced EF.     Neuro exam is promising today. She opens eyes and is moving extremities. Tolerating dialysis and is off of vasopressors.     Did well with extubation. Tolerated HD. TDC removed- 24 hour holiday is planned until replacement.   PLAN:   - Continue broad spectrum antibiotics today  - stable extubated   - Appreciate nephrology and cardiology consultants assistance   - Defer management of NSTEMI, AF and EF to cardiology, may require AICD per cardiology team due to reduced EF  - try to increase activity, PT/OT, get out of bed and up in chair  - ok for step down from ICU     Seymour Nicole MD  [facility] Pulmonary/Critical Care  07/01/2024    Upon my evaluation, this patient had a high probability of imminent or life-threatening deterioration, which required my direct attention, intervention, and personal management.    I have personally provided at least 35 minutes of critical care time exclusive of time spent on separately billable procedures. Over 50% of the time of this encounter was spent in direct care at the bedside. Time includes review of laboratory data, radiology  results, discussion with consultants, and monitoring for potential decompensation. Interventions were performed as documented above.

## 2024-07-01 NOTE — PLAN OF CARE
Problem: Physical Therapy  Goal: Physical Therapy Goal  Description: Goals to be met by: 24     Patient will increase functional independence with mobility by performin. Supine to sit with Supervision  2. Sit to stand transfer with Supervision  3. Bed to chair transfer with Supervision using Rolling Walker  4. Gait  x 150 feet with Supervision using Rolling Walker.         Outcome: Progressing

## 2024-07-01 NOTE — PROGRESS NOTES
Nephrology Progress Note        Patient Name: Juhi Taylor  MRN: [id number]    Patient Class: IP- Inpatient   Admission Date: 6/18/2024  Length of Stay: 12 days  Date of Service: 7/1/2024    Attending Physician: Manuel Christensen MD  Primary Care Provider: Tony Sadler MD    Reason for Consult: josue    SUBJECTIVE:     HPI: 73F fallowed with Dr. Martinez from nephrology for advanced CKD stage 4-5 getting admitted with fluid overload. Pt had PD catheter insertion 2 weeks ago in preparation for initiation of dialysis, however had post-operative issues with increased leakage. Then her PD in initiation and training was delayed due to insurance issues. She has oliguria/increased weight gain in past 2 weeks, SOB on exertion and cough. Symptoms got worse and she came to ER and got admitted.     I saw her in the ER and recommend trial of lasix gtt, cassia. Will consult Dr. Catalan to evaluate patient and make recommendation for catheter care - we probably will not be able to use it for now for dialysis.    6/19 VSS. Only 175cc UO documented, but may be inaccurately charted - pt boarding in ER. Will increase lasix gtt to 10mg/hr and ensure at least q shift UO documentation.    6/20 VSS. BP low, stop Clonidine patch. Increase lasix gtt to 15mg/hr, add metolazone. Stop Fluconazole in 1-2 days after stopping IV abx for suspected PNA. Appreciate Surgery input - will d/w patient initiation of HD tomorrow if unable to produce much UO and symptomatic improvement...    6/21 VSS. Will discuss initiation of dialysis via HD, she is not responding to diuretics.    6/22 VSS. Plan for HD today after dialysis access placement. Seen on HD, BF around 275 cc/min max, otherwise tolerating well. Will have to be repositioned in cath lab by Dr. Griffin on Monday.    6/23 VSS. Getting HD cath repositioned today, plan HD tomorrow.    6/24 VSS, on 2-3L NC, first HD 6/22, second HD today    6/25 105 min into HD treatment, patient  became lethargic acutely (was conversing/calm)- gave NS bolus- went into PEA arrest- had ACLS with ROSC- net UF 750cc, intubated and transferred to ICU, bradycardic, SBP 110s, FIO2 35%, UOP 100cc, required amio and levophed but now off- mld rise in trop noted, lactic acid normal, CXR clear, echo pending    6/26 tolerated HD yest- got off 2L- echo with EF 20-25% with grade III DD which is much worse compared to < 1 week ago with EF 40-45% suggesting cardiac event- trop peaked- per cards plan for life vest and may need LHC- HR 50s, -160s, FIO2 30%, UOP 550cc  - seen on HD  - bleeding at TDC exit site is better    6/27 tolerated HD well yest- got off 3L- -160, FIO2 30%, UOP 400cc    6/28 tolerated HD with restraints yest- got off 4L, SBP > 150, on 2L NC, ordered 4L UF- niece who is a dialysis nurse asked that we only take off 3L, UOP 245cc  - seen on HD  - conversant, calm- still very edematous    6/29 elevated HR (AF), SBP stable, on 4L NC, no UOP, WBC is going up, CXR yest with improvement in pulm edema, some blisters on her arm getting wound care (infiltrated IV)- ? Infx  source- sitting in chair- lung movement better, c/w edema, more alert today    6/30 TDC with pus collection around area of tunneling- this explains source of infection- truly appreciate Dr. Brunson coming out last night to remove access- remains in AF with RVR- SBP < 140, on 2-4L NC, looks much better today, sitting at edge of bed, more alert, more conversant, asking appropriate questions and giving appropriate responses, still a little confused at times about course of her hospitalization but overall much improved mental status wise    7/1  tired.  Denies chest pain or sob.  Afebrile, tachycardic.  Bp labile     ASSESSMENT/PLAN:     Sepsis due to infected TDC- access removed 6/29  - initiated vanc and cefepime on 6/29  - 6/29 blood cx from peripheral and TDC tip so far negative  - leukocytosis is improving  - other workup- LE duplex  neg for DVT, getting wound care for blisters on R arm where IV infiltrated     CKD stage 5- uremia, hyperkalemia, oliguria  PD cathether in place  Initiated on RRT via IHD this admission using TDC (6/22)  - last HD 6/28  - held HD over weekend due to sepsis requiring TDC removal 6/29.  --hold HD today and reassess in am  Will need new line likely tomorrow  - hopeful we can plan for another TDC Monday or Tuesday depending on cultures  - renal diet, 1.5L fluid restriction  - will need outpatient HD unit placement established prior to discharge  - dose meds for CrCl < 10/HD    PEA/VT arrest during dialysis 6/24- EF 20-25% (new)  AF with RVR  - echo shows newly depressed EF supportive of a cardiac event- could have been precipitated by myocardial stress during dialysis- don't feel that trop rise is c/w an acute NSTEMI however  - patient has declined LHC - patient is declining plavix- plan for life vest and eventually AICD which will probably need to happen at [facility]  - titrating beta blocker for AF with RVR- patient is declining amiodarone and eliquis- she has agreed to heparin SQ    Baseline HTN  Volume overload/S CHF exacerbation  Hyponatremia  - has been improving with daily UF last week  - since we don't have dialysis access right now, will start lasix 40mg IV BID as she is urinating some    HypoK/HypoMg  - will supplement PRN to prevent cardiac disturbance    MBD / Secondary HPT  - phos is at goal- continue renal based nutrition  - last PTH acceptable    Anemia of CKD  Thrombocytopenia  - Hb stable 10-11- continue PONCE with HD  - plat normalized     Thank you for allowing us to participate in the care of your patient!   We will follow the patient and provide recommendations as needed.         Laboratory:  Recent Labs   Lab 06/29/24  0058 06/30/24  0320 07/01/24  0405    136 137   K 3.8 4.4 4.4    104 105   CO2 23 21* 20*   BUN 24* 41* 57*   CREATININE 3.0* 4.3* 5.6*   * 120* 114*       Recent  "Labs   Lab 06/27/24  0045 06/27/24  0310 06/28/24  0541 06/29/24  0058 06/30/24  0320 07/01/24  0405   CALCIUM 8.6*  --  8.6* 8.7 8.9 8.6*   ALBUMIN 3.3*  --  2.9* 3.1*  --   --    PHOS 3.7  --   --  4.2  --   --    MG 2.0   < > 1.9 2.0 2.2 2.4    < > = values in this interval not displayed.       Recent Labs   Lab 10/10/23  1113 01/22/24  1133 05/08/24  1139   PTH, Intact 583.1 H 506.7 H 291.7 H       No results for input(s): "POCTGLUCOSE" in the last 168 hours.    Recent Labs   Lab 07/05/23  1533 01/22/24  1133 06/19/24  0443   Hemoglobin A1C 5.6 5.4 6.8 H       Recent Labs   Lab 06/29/24  0158 06/30/24  0320 07/01/24  0405   WBC 17.29* 15.29* 12.89*   HGB 11.3* 10.6* 10.4*   HCT 38.5 36.8* 36.1*    196 196   MCV 87 87 89   MCHC 29.4* 28.8* 28.8*   MONO 7.4  1.3* 9.5  1.5* 13.3  1.7*   EOSINOPHIL 0.9 1.4 1.9       Recent Labs   Lab 06/27/24  0045 06/28/24  0541 06/29/24  0058   BILITOT 1.0 1.0 1.1*   BILIDIR 0.4*  --   --    PROT 6.0 5.7* 6.1   ALBUMIN 3.3* 2.9* 3.1*   ALKPHOS 90 86 96   ALT 5* 6* 7*   AST 25 17 17       Recent Labs   Lab 03/17/22  2240 04/07/22  1536 04/17/23  1448 07/14/23 2026 03/14/24  2254 05/09/24  1029 06/23/24  1816   Color, UA Colorless A   < > Colorless A  --  Yellow Yellow  --    Appearance, UA Clear   < > Clear  --  Clear Clear  --    pH, UA 5.0   < > 6.0  --  6.0 6.0  --    Specific Warren, UA 1.010   < > 1.010  --  1.010 1.015  --    Protein, UA Negative   < > 1+ A  --  1+ A 1+ A  --    Glucose, UA Negative   < > Negative  --  Negative Negative  --    Ketones, UA Negative   < > Negative  --  Negative Negative  --    Urobilinogen, UA Negative  --   --   --  Negative  --   --    Bilirubin (UA) Negative   < > Negative  --  Negative Negative  --    Occult Blood UA Negative   < > Negative  --  TRACE Negative  --    Nitrite, UA Negative   < > Negative  --  Negative Negative  --    RBC, UA  --    < > 3   < > 3 1 >100 H   WBC, UA  --    < > 3   < > 5 1 >100 H   Bacteria  --    < " > None   < > None Rare Many A   Hyaline Casts, UA  --    < > 0  --  0 0  --     < > = values in this interval not displayed.       Recent Labs   Lab 06/26/24  1322 06/27/24  0415 06/27/24  0539   POC PH 7.440 7.437 7.434   POC PCO2 39.0 35.8 34.3 L   POC HCO3 26.5 24.1 23.0 L   POC PO2 95 72 L 78 L   POC SATURATED O2 98 95 96   POC BE 2 0 -1   Sample ARTERIAL ARTERIAL ARTERIAL       Microbiology Results (last 7 days)       Procedure Component Value Units Date/Time    IV catheter culture [[id number]] Collected: 06/29/24 2255    Order Status: Completed Specimen: Catheter Tip, Subclavian Updated: 07/01/24 0734     Aerobic Culture - Cath tip No growth    Narrative:      Left subclavian HD catheter    Blood culture [[id number]] Collected: 06/29/24 1607    Order Status: Completed Specimen: Blood Updated: 06/30/24 1832     Blood Culture, Routine No Growth to date      No Growth to date    Blood culture [[id number]] Collected: 06/29/24 1607    Order Status: Completed Specimen: Blood Updated: 06/30/24 1832     Blood Culture, Routine No Growth to date      No Growth to date    Blood culture [[id number]] Collected: 06/24/24 2016    Order Status: Completed Specimen: Blood from Peripheral, Hand, Right Updated: 06/29/24 2232     Blood Culture, Routine No growth after 5 days.    Culture, Respiratory with Gram Stain [[id number]] Collected: 06/24/24 1848    Order Status: Completed Specimen: Respiratory from Sputum, Expectorated Updated: 06/26/24 0918     Respiratory Culture No normal respiratory lesley     Gram Stain (Respiratory) <10 epithelial cells per low power field.     Gram Stain (Respiratory) Few WBC's     Gram Stain (Respiratory) No organisms seen            Review of patient's allergies indicates:   Allergen Reactions    Percocet [oxycodone-acetaminophen] Itching    Amiodarone analogues      Itching      Irbesartan Swelling    Januvia [sitagliptin]     Jardiance [empagliflozin]      Leg cramps    Lipitor [atorvastatin]  Other (See Comments)     Severe leg pain    Linaclotide Other (See Comments) and Nausea And Vomiting     Does not remember    Lubiprostone Other (See Comments) and Palpitations     Does not remember       Outpatient meds:  No current facility-administered medications on file prior to encounter.     Current Outpatient Medications on File Prior to Encounter   Medication Sig Dispense Refill    allopurinoL (ZYLOPRIM) 300 MG tablet Take 1 tablet (300 mg total) by mouth once daily. 90 tablet 3    amLODIPine (NORVASC) 10 MG tablet Take 10 mg by mouth once daily.      calcitRIOL (ROCALTROL) 0.5 MCG Cap Take 0.5 mcg by mouth once daily.      carvediloL (COREG) 25 MG tablet Take 1 tablet (25 mg total) by mouth 2 (two) times daily. 180 tablet 2    cyanocobalamin (VITAMIN B-12) 100 MCG tablet Take 100 mcg by mouth once daily.      evolocumab (REPATHA SURECLICK) 140 mg/mL PnIj Inject 1 mL (140 mg total) into the skin every 14 (fourteen) days. 2 mL 11    furosemide (LASIX) 40 MG tablet Take 40 mg by mouth once daily.      loratadine (CLARITIN) 10 mg tablet Take 10 mg by mouth once daily.      magnesium oxide (MAG-OX) 400 mg (241.3 mg magnesium) tablet Take 1 tablet (400 mg total) by mouth daily as needed (cramping). 30 tablet 0    blood sugar diagnostic (TRUE METRIX GLUCOSE TEST STRIP) Strp Use 1x daily. Insurance preferred. 100 strip 3    blood sugar diagnostic Strp To check BG 1 time daily, to use with insurance preferred meter 100 strip 3    cloNIDine (CATAPRES) 0.1 MG tablet Take 1 tablet (0.1 mg total) by mouth 3 (three) times daily as needed (PRN SBP > 165 mmHg). 90 tablet 6    cloNIDine 0.1 mg/24 hr td ptwk (CATAPRES) 0.1 mg/24 hr Place 1 patch onto the skin every 7 days. 4 patch 4    lancets Misc To check BG 1 times daily, to use with insurance preferred meter 100 each 3    nitroGLYCERIN (NITROSTAT) 0.4 MG SL tablet Place 1 tablet (0.4 mg total) under the tongue every 5 (five) minutes as needed for Chest pain. 25 tablet 0     [DISCONTINUED] aspirin (ECOTRIN) 81 MG EC tablet Take 1 tablet (81 mg total) by mouth once daily. 90 tablet 3    [DISCONTINUED] DULoxetine (CYMBALTA) 20 MG capsule TAKE ONE CAPSULE BY MOUTH ONCE DAILY 30 capsule 4    [DISCONTINUED] folic acid (FOLVITE) 1 MG tablet Take 1 mg by mouth once daily.      [DISCONTINUED] metFORMIN (GLUCOPHAGE-XR) 500 MG ER 24hr tablet Take 2 tablets (1,000 mg total) by mouth 2 (two) times daily with meals. 360 tablet 3    [DISCONTINUED] sodium bicarbonate 650 MG tablet Take 1 tablet (650 mg total) by mouth once daily. 30 tablet 11       Scheduled meds:   ceFEPime IV (PEDS and ADULTS)  2 g Intravenous Q24H    epoetin alaina-epbx  50 Units/kg Intravenous Every Mon, Wed, Fri    famotidine  20 mg Oral Daily    furosemide (LASIX) injection  40 mg Intravenous Q12H    guaiFENesin 100 mg/5 ml  400 mg Oral BID    heparin (porcine)  5,000 Units Subcutaneous Q8H    sotaloL  40 mg Oral BID       Infusions:          PRN meds:    Current Facility-Administered Medications:     acetaminophen, 650 mg, Oral, Q8H PRN    aluminum-magnesium hydroxide-simethicone, 30 mL, Oral, QID PRN    dextrose 50%, 12.5 g, Intravenous, PRN    dextrose 50%, 25 g, Intravenous, PRN    glucagon (human recombinant), 1 mg, Intramuscular, PRN    glucose, 24 g, Oral, PRN    melatonin, 6 mg, Oral, Nightly PRN    ondansetron, 4 mg, Intravenous, Q6H PRN    Pharmacy to dose Vancomycin consult, , , Once **AND** vancomycin - pharmacy to dose, , Intravenous, pharmacy to manage frequency      OBJECTIVE:     Vital Signs and IO:  Temp:  [97.8 °F (36.6 °C)-98.6 °F (37 °C)]   Pulse:  []   Resp:  [13-46]   BP: ()/()   SpO2:  [91 %-100 %]   I/O last 3 completed shifts:  In: 500 [P.O.:300; IV Piggyback:200]  Out: -   Wt Readings from Last 5 Encounters:   06/29/24 132.3 kg (291 lb 10.7 oz)   06/25/24 (!) 147.9 kg (326 lb)   06/19/24 132.5 kg (292 lb 1.8 oz)   06/04/24 134.3 kg (296 lb)   05/27/24 134.3 kg (296 lb)     Body mass  index is 45.68 kg/m².    Physical Exam  Constitutional:       General: Patient is not in acute distress.     Appearance: Patient is well-developed. She is not diaphoretic.   HENT:      Head: Normocephalic and atraumatic.      Mouth/Throat: Mucous membranes are moist.   Eyes:      General: No scleral icterus.     Pupils: Pupils are equal, round, and reactive to light.   Cardiovascular:      Rate and Rhythm: Normal rate and regular rhythm.   Pulmonary:      Effort: Pulmonary effort is normal. No respiratory distress.      Breath sounds: No stridor.   Abdominal:      General: There is no distension.      Palpations: Abdomen is soft.   Musculoskeletal:         General: No deformity. Normal range of motion.      Cervical back: Neck supple.   Skin:     General: Skin is warm and dry.      Findings: No rash present. No erythema.   Neurological:      Mental Status: Patient is alert and oriented to person, place, and time.      Cranial Nerves: No cranial nerve deficit.   Psychiatric:         Behavior: Behavior normal.          Patient care time was spent personally by me on the following activities: > 35 min    Obtaining a history.  Examination of patient.  Providing medical care at the patients bedside.  Developing a treatment plan with patient or surrogate and bedside caregivers.  Ordering and reviewing laboratory studies, radiographic studies, pulse oximetry.  Ordering and performing treatments and interventions.  Evaluation of patient's response to treatment.  Discussions with consultants while on the unit and immediately available to the patient.  Re-evaluation of the patient's condition.  Documentation in the medical record.     Inder Khalil MD    [facility]  [address] [place] [address]  JULIANNA Shah [address]    [phone] - tel  [phone] - fax    7/1/2024

## 2024-07-01 NOTE — PT/OT/SLP PROGRESS
Physical Therapy Treatment    Patient Name:  Juhi Taylor   MRN:  [id number]    Recommendations:     Discharge Recommendations: High Intensity Therapy  Discharge Equipment Recommendations: to be determined by next level of care  Barriers to discharge:  Fall risk, medical status, increased burden of care    Assessment:     Juhi Taylor is a 74 y.o. female admitted with a medical diagnosis of Chronic kidney disease (CKD), stage V.  She presents with the following impairments/functional limitations: weakness, impaired endurance, impaired self care skills, impaired functional mobility, gait instability, impaired balance, decreased upper extremity function, decreased lower extremity function, decreased safety awareness, edema, impaired cardiopulmonary response to activity. First attempt for PT treatment patient sitting EOB eating lunch. Second attempt housekeeping present mopping the floor. Patient is agreeable to participation with PT treatment upon third attempt. She requires SBA for supine to sit transfer. Patient sat EOB with SBA with daughter present assisting with obtaining vision prescription from wallet to go get patient some new glasses. Then the Cardiologist was present at bedside. She then required ModA for sit to stand with RW. Upon standing patient reports fatigue and declines ambulation. She returned to sitting EOB and performed lateral scoot to the right with SBA and VC. She returned to bed with bed alarm on and RN present.     Rehab Prognosis: Good; patient would benefit from acute skilled PT services to address these deficits and reach maximum level of function.    Recent Surgery: Procedure(s) (LRB):  Insertion,catheter (N/A) 8 Days Post-Op    Plan:     During this hospitalization, patient to be seen 6 x/week to address the identified rehab impairments via gait training, therapeutic activities, therapeutic exercises and progress toward the following goals:    Plan of Care Expires:   24    Subjective     Chief Complaint: fatigue  Patient/Family Comments/goals: lie back down  Pain/Comfort:  Pain Rating 1: 0/10      Objective:     Communicated with NEENA Ocasio prior to session.  Patient found HOB elevated with PureWick, telemetry, oxygen upon PT entry to room.     General Precautions: Standard, fall, respiratory  Orthopedic Precautions: N/A  Braces: N/A  Respiratory Status: Nasal cannula, flow 2 L/min     Functional Mobility:  Bed Mobility:     Scooting: stand by assistance  Supine to Sit: stand by assistance  Sit to Supine: minimum assistance  Transfers:     Sit to Stand:  moderate assistance with rolling walker      AM-PAC 6 CLICK MOBILITY  Turning over in bed (including adjusting bedclothes, sheets and blankets)?: 4  Sitting down on and standing up from a chair with arms (e.g., wheelchair, bedside commode, etc.): 3  Moving from lying on back to sitting on the side of the bed?: 4  Moving to and from a bed to a chair (including a wheelchair)?: 3  Need to walk in hospital room?: 2  Climbing 3-5 steps with a railing?: 1  Basic Mobility Total Score: 17       Treatment & Education:  Patient was educated on the importance of OOB activity and functional mobility to negate negative effects of prolonged bed rest during hospitalization, safe transfers and ambulation, and D/C planning     Patient left HOB elevated with all lines intact, call button in reach, bed alarm on, and RN present..    GOALS:   Multidisciplinary Problems       Physical Therapy Goals          Problem: Physical Therapy    Goal Priority Disciplines Outcome Goal Variances Interventions   Physical Therapy Goal     PT, PT/OT Progressing     Description: Goals to be met by: 24     Patient will increase functional independence with mobility by performin. Supine to sit with Supervision  2. Sit to stand transfer with Supervision  3. Bed to chair transfer with Supervision using Rolling Walker  4. Gait  x 150 feet with  Supervision using Rolling Walker.                              Time Tracking:     PT Received On: 07/01/24  PT Start Time: 1349     PT Stop Time: 1417  PT Total Time (min): 28 min     Billable Minutes: Therapeutic Activity 28    Treatment Type: Treatment  PT/PTA: PT     Number of PTA visits since last PT visit: 0     07/01/2024

## 2024-07-01 NOTE — RESPIRATORY THERAPY
06/30/24 1955   Patient Assessment/Suction   Level of Consciousness (AVPU) alert   Respiratory Effort Unlabored;Normal   Expansion/Accessory Muscles/Retractions no retractions;no use of accessory muscles   All Lung Fields Breath Sounds Anterior:;diminished   Rhythm/Pattern, Respiratory no shortness of breath reported;unlabored   Cough Frequency infrequent   Cough Type fair   PRE-TX-O2   Device (Oxygen Therapy) high flow nasal cannula   Flow (L/min) (Oxygen Therapy) 2   SpO2 99 %   Pulse Oximetry Type Continuous   $ Pulse Oximetry - Multiple Charge Pulse Oximetry - Multiple   Pulse 108   Resp (!) 33   Positioning HOB elevated 30 degrees   Education   $ Education 15 min;Other (see comment)

## 2024-07-01 NOTE — ASSESSMENT & PLAN NOTE
Acute worsening HFrEF  EF 25% with elevated BNP  Will need lifevest and AICD as OP   S/p tunnel line and HD from there . Needed to remove because of possible line infection . Line culture sent   Amiodarone was on hold for bradycardia and possible allergy   Heparin drip was restarted   Patient refuse labs

## 2024-07-01 NOTE — NURSING
Nurses Note -- 4 Eyes      7/1/2024   7:35 AM      Skin assessed during: Daily Assessment      [] No Altered Skin Integrity Present    []Prevention Measures Documented      [x] Yes- Altered Skin Integrity Present or Discovered   [] LDA Added if Not in Epic (Describe Wound)   [] New Altered Skin Integrity was Present on Admit and Documented in LDA   [] Wound Image Taken    Wound Care Consulted? Yes    Attending Nurse:  Ninfa Strickland RN/Staff Member:  Medina Nguyễn

## 2024-07-01 NOTE — NURSING
Pt. Bed alarm sounded. Upon entering the room, I found the pt standing on side of the bed, midline removed. Notified Dr. Quintero who ordered a bedside sitter.

## 2024-07-01 NOTE — PLAN OF CARE
Problem: Infection  Goal: Absence of Infection Signs and Symptoms  Outcome: Not Progressing     Problem: Adult Inpatient Plan of Care  Goal: Plan of Care Review  Outcome: Not Progressing  Goal: Patient-Specific Goal (Individualized)  Outcome: Not Progressing  Goal: Absence of Hospital-Acquired Illness or Injury  Outcome: Not Progressing  Goal: Optimal Comfort and Wellbeing  Outcome: Not Progressing  Goal: Readiness for Transition of Care  Outcome: Not Progressing     Problem: Bariatric Environmental Safety  Goal: Safety Maintained with Care  Outcome: Not Progressing     Problem: Fall Injury Risk  Goal: Absence of Fall and Fall-Related Injury  Outcome: Not Progressing     Problem: Skin Injury Risk Increased  Goal: Skin Health and Integrity  Outcome: Not Progressing     Problem: Diabetes Comorbidity  Goal: Blood Glucose Level Within Targeted Range  Outcome: Not Progressing     Problem: Wound  Goal: Optimal Coping  Outcome: Not Progressing  Goal: Optimal Functional Ability  Outcome: Not Progressing  Goal: Absence of Infection Signs and Symptoms  Outcome: Not Progressing  Goal: Improved Oral Intake  Outcome: Not Progressing  Goal: Optimal Pain Control and Function  Outcome: Not Progressing  Goal: Skin Health and Integrity  Outcome: Not Progressing  Goal: Optimal Wound Healing  Outcome: Not Progressing     Problem: Hemodialysis  Goal: Safe, Effective Therapy Delivery  Outcome: Not Progressing  Goal: Effective Tissue Perfusion  Outcome: Not Progressing  Goal: Absence of Infection Signs and Symptoms  Outcome: Not Progressing     Problem: Restraint, Nonviolent  Goal: Absence of Harm or Injury  Outcome: Not Progressing     Problem: Coping Ineffective  Goal: Effective Coping  Outcome: Not Progressing     Problem: Sepsis/Septic Shock  Goal: Optimal Coping  Outcome: Not Progressing  Goal: Absence of Bleeding  Outcome: Not Progressing  Goal: Blood Glucose Level Within Targeted Range  Outcome: Not Progressing  Goal: Absence of  Infection Signs and Symptoms  Outcome: Not Progressing  Goal: Optimal Nutrition Intake  Outcome: Not Progressing   Pt. Was able to sit on side of the bed and stand with standby assist. Cardizem gtt infusing as ordered. Pt. Updated multiple times on poc. She continusly attempts to exit bed and is disoriented to place and situation.

## 2024-07-01 NOTE — PLAN OF CARE
Spoke to [facility] about request to be transferred for cardiac work up. Discussed the case and agreed that patient should have dialysis access and stable on dialysis prior to any cardiac work up.     Talked with the daughter and explained and she is agreeable to hold off the transfer request for now. Patient has expressed to the nurses that she wants to be DNR. Patient understands what's going on however during interview, she repeated asks same questions, therefore I am not sure whether she can make complex medical decisions.     Palliative care has been consulted and family meeting scheduled for tomorrow.
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
## 2024-07-01 NOTE — PROGRESS NOTES
Pharmacokinetic Assessment Follow Up: IV Vancomycin    Vancomycin serum concentration assessment(s):    The trough level was drawn correctly and can be used to guide therapy at this time. The measurement is within the desired definitive target range of 10 to 15 mcg/mL.    Vancomycin Regimen Plan:    Give 500 mg today with next level on 7/3 with AM labs.    Drug levels (last 3 results):  Recent Labs   Lab Result Units 07/01/24  0405   Vancomycin, Random ug/mL 11.4       Pharmacy will continue to follow and monitor vancomycin.    Please contact pharmacy at extension [phone] for questions regarding this assessment.    Thank you for the consult,   Laura Quijano       Patient brief summary:  Juhi Taylor is a 74 y.o. female initiated on antimicrobial therapy with IV Vancomycin for treatment of lower respiratory infection    The patient's current regimen is intermittent dosing    Drug Allergies:   Review of patient's allergies indicates:   Allergen Reactions    Percocet [oxycodone-acetaminophen] Itching    Amiodarone analogues      Itching      Irbesartan Swelling    Januvia [sitagliptin]     Jardiance [empagliflozin]      Leg cramps    Lipitor [atorvastatin] Other (See Comments)     Severe leg pain    Linaclotide Other (See Comments) and Nausea And Vomiting     Does not remember    Lubiprostone Other (See Comments) and Palpitations     Does not remember       Actual Body Weight:   132.3 kg    Renal Function:   Estimated Creatinine Clearance: 12.9 mL/min (A) (based on SCr of 5.6 mg/dL (H)).,     Dialysis Method (if applicable):  intermittent HD    CBC (last 72 hours):  Recent Labs   Lab Result Units 06/29/24  0158 06/30/24  0320 07/01/24  0405   WBC K/uL 17.29* 15.29* 12.89*   Hemoglobin g/dL 11.3* 10.6* 10.4*   Hematocrit % 38.5 36.8* 36.1*   Platelets K/uL 159 196 196   Gran % % 83.4* 75.2* 67.3   Lymph % % 7.7* 12.8* 16.9*   Mono % % 7.4 9.5 13.3   Eosinophil % % 0.9 1.4 1.9   Basophil % % 0.1 0.2 0.3    Differential Method  Automated Automated Automated       Metabolic Panel (last 72 hours):  Recent Labs   Lab Result Units 06/29/24  0058 06/30/24  0320 07/01/24  0405   Sodium mmol/L 137 136 137   Potassium mmol/L 3.8 4.4 4.4   Chloride mmol/L 104 104 105   CO2 mmol/L 23 21* 20*   Glucose mg/dL 121* 120* 114*   BUN mg/dL 24* 41* 57*   Creatinine mg/dL 3.0* 4.3* 5.6*   Albumin g/dL 3.1*  --   --    Total Bilirubin mg/dL 1.1*  --   --    Alkaline Phosphatase U/L 96  --   --    AST U/L 17  --   --    ALT U/L 7*  --   --    Magnesium mg/dL 2.0 2.2 2.4   Phosphorus mg/dL 4.2  --   --        Vancomycin Administrations:  vancomycin given in the last 96 hours                     vancomycin 500 mg in dextrose 5 % 100 mL IVPB (ready to mix) (mg) 500 mg New Bag 06/29/24 1802                    Microbiologic Results:  Microbiology Results (last 7 days)       Procedure Component Value Units Date/Time    IV catheter culture [[id number]] Collected: 06/29/24 2255    Order Status: Completed Specimen: Catheter Tip, Subclavian Updated: 07/01/24 0734     Aerobic Culture - Cath tip No growth    Narrative:      Left subclavian HD catheter    Blood culture [[id number]] Collected: 06/29/24 1607    Order Status: Completed Specimen: Blood Updated: 06/30/24 1832     Blood Culture, Routine No Growth to date      No Growth to date    Blood culture [[id number]] Collected: 06/29/24 1607    Order Status: Completed Specimen: Blood Updated: 06/30/24 1832     Blood Culture, Routine No Growth to date      No Growth to date    Blood culture [[id number]] Collected: 06/24/24 2016    Order Status: Completed Specimen: Blood from Peripheral, Hand, Right Updated: 06/29/24 2232     Blood Culture, Routine No growth after 5 days.    Culture, Respiratory with Gram Stain [[id number]] Collected: 06/24/24 1848    Order Status: Completed Specimen: Respiratory from Sputum, Expectorated Updated: 06/26/24 0918     Respiratory Culture No normal respiratory lesley      Gram Stain (Respiratory) <10 epithelial cells per low power field.     Gram Stain (Respiratory) Few WBC's     Gram Stain (Respiratory) No organisms seen

## 2024-07-01 NOTE — ASSESSMENT & PLAN NOTE
Likely infection from tunneled cath site   Blood culture negative   Tip culture negative so far   - On cefepime and Vancomycin  - if culture remain negative, de-escalate antibiotics

## 2024-07-01 NOTE — ASSESSMENT & PLAN NOTE
Afib with rapid ventricular response   - Cardiology following   - Patient still tachycardic   - On Sotalol   - has refused eliquis

## 2024-07-02 PROBLEM — N18.6 ESRD (END STAGE RENAL DISEASE): Status: ACTIVE | Noted: 2022-08-20

## 2024-07-02 LAB
ANION GAP SERPL CALC-SCNC: 13 MMOL/L (ref 8–16)
BASOPHILS # BLD AUTO: 0.07 K/UL (ref 0–0.2)
BASOPHILS NFR BLD: 0.6 % (ref 0–1.9)
BUN SERPL-MCNC: 72 MG/DL (ref 8–23)
CALCIUM SERPL-MCNC: 8.7 MG/DL (ref 8.7–10.5)
CHLORIDE SERPL-SCNC: 105 MMOL/L (ref 95–110)
CO2 SERPL-SCNC: 18 MMOL/L (ref 23–29)
CREAT SERPL-MCNC: 6.2 MG/DL (ref 0.5–1.4)
DIFFERENTIAL METHOD BLD: ABNORMAL
EOSINOPHIL # BLD AUTO: 0.2 K/UL (ref 0–0.5)
EOSINOPHIL NFR BLD: 1.9 % (ref 0–8)
ERYTHROCYTE [DISTWIDTH] IN BLOOD BY AUTOMATED COUNT: 20.9 % (ref 11.5–14.5)
EST. GFR  (NO RACE VARIABLE): 6.7 ML/MIN/1.73 M^2
GLUCOSE SERPL-MCNC: 108 MG/DL (ref 70–110)
HCT VFR BLD AUTO: 39.7 % (ref 37–48.5)
HGB BLD-MCNC: 11.3 G/DL (ref 12–16)
IMM GRANULOCYTES # BLD AUTO: 0.05 K/UL (ref 0–0.04)
IMM GRANULOCYTES NFR BLD AUTO: 0.5 % (ref 0–0.5)
LYMPHOCYTES # BLD AUTO: 2 K/UL (ref 1–4.8)
LYMPHOCYTES NFR BLD: 18.3 % (ref 18–48)
MAGNESIUM SERPL-MCNC: 2.4 MG/DL (ref 1.6–2.6)
MCH RBC QN AUTO: 25.5 PG (ref 27–31)
MCHC RBC AUTO-ENTMCNC: 28.5 G/DL (ref 32–36)
MCV RBC AUTO: 89 FL (ref 82–98)
MONOCYTES # BLD AUTO: 1.5 K/UL (ref 0.3–1)
MONOCYTES NFR BLD: 13.8 % (ref 4–15)
NEUTROPHILS # BLD AUTO: 7.1 K/UL (ref 1.8–7.7)
NEUTROPHILS NFR BLD: 64.9 % (ref 38–73)
NRBC BLD-RTO: 0 /100 WBC
OHS QRS DURATION: 140 MS
OHS QTC CALCULATION: 482 MS
PLATELET # BLD AUTO: 151 K/UL (ref 150–450)
PMV BLD AUTO: 10.2 FL (ref 9.2–12.9)
POTASSIUM SERPL-SCNC: 4.7 MMOL/L (ref 3.5–5.1)
RBC # BLD AUTO: 4.44 M/UL (ref 4–5.4)
SODIUM SERPL-SCNC: 136 MMOL/L (ref 136–145)
WBC # BLD AUTO: 10.98 K/UL (ref 3.9–12.7)

## 2024-07-02 PROCEDURE — 25000003 PHARM REV CODE 250: Performed by: INTERNAL MEDICINE

## 2024-07-02 PROCEDURE — 99291 CRITICAL CARE FIRST HOUR: CPT | Mod: ,,, | Performed by: STUDENT IN AN ORGANIZED HEALTH CARE EDUCATION/TRAINING PROGRAM

## 2024-07-02 PROCEDURE — 63600175 PHARM REV CODE 636 W HCPCS: Performed by: INTERNAL MEDICINE

## 2024-07-02 PROCEDURE — 83735 ASSAY OF MAGNESIUM: CPT | Performed by: INTERNAL MEDICINE

## 2024-07-02 PROCEDURE — 25000003 PHARM REV CODE 250: Performed by: NURSE PRACTITIONER

## 2024-07-02 PROCEDURE — 97535 SELF CARE MNGMENT TRAINING: CPT

## 2024-07-02 PROCEDURE — 92526 ORAL FUNCTION THERAPY: CPT

## 2024-07-02 PROCEDURE — 99233 SBSQ HOSP IP/OBS HIGH 50: CPT | Mod: 95,,, | Performed by: INTERNAL MEDICINE

## 2024-07-02 PROCEDURE — 85025 COMPLETE CBC W/AUTO DIFF WBC: CPT | Performed by: INTERNAL MEDICINE

## 2024-07-02 PROCEDURE — 97530 THERAPEUTIC ACTIVITIES: CPT

## 2024-07-02 PROCEDURE — 27000221 HC OXYGEN, UP TO 24 HOURS

## 2024-07-02 PROCEDURE — 94761 N-INVAS EAR/PLS OXIMETRY MLT: CPT

## 2024-07-02 PROCEDURE — 63600175 PHARM REV CODE 636 W HCPCS: Mod: JG | Performed by: INTERNAL MEDICINE

## 2024-07-02 PROCEDURE — 21000000 HC CCU ICU ROOM CHARGE

## 2024-07-02 PROCEDURE — 99900031 HC PATIENT EDUCATION (STAT)

## 2024-07-02 PROCEDURE — 36415 COLL VENOUS BLD VENIPUNCTURE: CPT | Performed by: INTERNAL MEDICINE

## 2024-07-02 PROCEDURE — 99222 1ST HOSP IP/OBS MODERATE 55: CPT | Mod: 24,,, | Performed by: SURGERY

## 2024-07-02 PROCEDURE — 80048 BASIC METABOLIC PNL TOTAL CA: CPT | Performed by: INTERNAL MEDICINE

## 2024-07-02 RX ORDER — SODIUM CHLORIDE 9 MG/ML
INJECTION, SOLUTION INTRAVENOUS
Status: CANCELLED | OUTPATIENT
Start: 2024-07-02

## 2024-07-02 RX ORDER — SODIUM CHLORIDE 9 MG/ML
INJECTION, SOLUTION INTRAVENOUS ONCE
Status: CANCELLED | OUTPATIENT
Start: 2024-07-02 | End: 2024-07-02

## 2024-07-02 RX ORDER — HYDROXYZINE HYDROCHLORIDE 25 MG/1
25 TABLET, FILM COATED ORAL 3 TIMES DAILY PRN
Status: DISCONTINUED | OUTPATIENT
Start: 2024-07-02 | End: 2024-07-03 | Stop reason: HOSPADM

## 2024-07-02 RX ADMIN — CEFEPIME 2 G: 2 INJECTION, POWDER, FOR SOLUTION INTRAVENOUS at 08:07

## 2024-07-02 RX ADMIN — METOPROLOL TARTRATE 25 MG: 25 TABLET, FILM COATED ORAL at 09:07

## 2024-07-02 RX ADMIN — DESMOPRESSIN ACETATE 39.69 MCG: 4 INJECTION, SOLUTION INTRAVENOUS; SUBCUTANEOUS at 02:07

## 2024-07-02 RX ADMIN — FUROSEMIDE 40 MG: 10 INJECTION, SOLUTION INTRAVENOUS at 08:07

## 2024-07-02 RX ADMIN — FAMOTIDINE 20 MG: 20 TABLET ORAL at 09:07

## 2024-07-02 RX ADMIN — DEXTROSE MONOHYDRATE 5 MG/HR: 50 INJECTION, SOLUTION INTRAVENOUS at 01:07

## 2024-07-02 RX ADMIN — HEPARIN SODIUM 5000 UNITS: 5000 INJECTION, SOLUTION INTRAVENOUS; SUBCUTANEOUS at 01:07

## 2024-07-02 RX ADMIN — HEPARIN SODIUM 5000 UNITS: 5000 INJECTION, SOLUTION INTRAVENOUS; SUBCUTANEOUS at 09:07

## 2024-07-02 RX ADMIN — ACETAMINOPHEN 650 MG: 325 TABLET ORAL at 06:07

## 2024-07-02 RX ADMIN — FUROSEMIDE 40 MG: 10 INJECTION, SOLUTION INTRAVENOUS at 09:07

## 2024-07-02 RX ADMIN — GUAIFENESIN 400 MG: 200 SOLUTION ORAL at 08:07

## 2024-07-02 RX ADMIN — METOPROLOL TARTRATE 25 MG: 25 TABLET, FILM COATED ORAL at 08:07

## 2024-07-02 NOTE — PT/OT/SLP PROGRESS
Speech Language Pathology Treatment    Patient Name:  Juhi Taylor   MRN:  [id number]  Admitting Diagnosis: Chronic kidney disease (CKD), stage V    Recommendations:                 General Recommendations:  Follow-up not indicated  Diet recommendations:  Soft & Bite Sized Diet - IDDSI Level 6, Liquid Diet Level: Thin   Aspiration Precautions: Standard aspiration precautions   General Precautions: Standard,  (IDDSI 6)  Communication strategies:  provide increased time to answer    Assessment:     Juhi Taylor is a 74 y.o. female with an SLP diagnosis of Mild oral prep Dysphagia due to poor dentition.  She was noted to be able to eat IDDSI 6 textures without dentures, and stated she preferred to cook food soft at home and not wear dentures.  Recommend continue IDDSI 6 with thin liquids.  No tx. .    Subjective     Crackers  Patient goals: Peanut butter for the crackers     Objective:     Has the patient been evaluated by SLP for swallowing?   Yes  Keep patient NPO? No   Current Respiratory Status:        Pt seen for tolerance of diet upgrade.  See Care Plan for tx details.    Goals:   Multidisciplinary Problems       SLP Goals       Not on file              Multidisciplinary Problems (Resolved)          Problem: SLP    Goal Priority Disciplines Outcome   SLP Goal   (Resolved)     SLP Met   Description: 1) Pt will consume Regular (IDDSI 7) textures and thin liquids (IDDSI 0) with functional oral phase and no overt s/s penetration/aspiration.                         Plan:     Patient to be seen:  3 x/week   Plan of Care expires:  07/05/24  Plan of Care reviewed with:  patient, family   SLP Follow-Up:  No       Discharge recommendations:  No Therapy Indicated   Barriers to Discharge:  None    Time Tracking:     SLP Treatment Date:   07/02/24  Speech Start Time:  1250  Speech Stop Time:  1256     Speech Total Time (min):  6 min    Billable Minutes: Treatment Swallowing Dysfunction 6    07/02/2024

## 2024-07-02 NOTE — CONSULTS
GENERAL SURGERY  INPATIENT CONSULT    REASON FOR CONSULT: Tunneled dialysis catheter    HPI: Juhi Taylor is a 74 y.o. female known to me for previously placed peritoneal dialysis catheter currently admitted for fluid overload status and initiation hemodialysis. Has had a relatively complicated course. Initially tried on Lasix drip with Damon but did not have adequate response. Subsequently required tunneled dialysis catheter then revision and was started on hemodialysis. Developed PEA arrest during treatment requiring ICU admission, intubation and resuscitation. Subsequently weaned off of vasoactive medication and extubated. Was having extensive bleeding at catheter exit site.  Subsequently required removal tunneled line due to concern for infection. Initially was considering palliative care but now willing to try hemodialysis again.  General surgery has been consulted for tunneled catheter placement.    Met with the patient and family at bedside in addition to patient's family member who has a dialysis nurse on the phone.  Was reported to them that the right jugular vein was not adequate for access therefore left-sided was utilized. Had bleeding for over 3 days from the access site and developed some significant amount of bruising and excoriation along the left chest wall.  Concerned about re attempting placement on the left side. Also some question about whether she would be transferred to [facility] for high-risk cardiac angiography after dialysis access was obtained.    I have reviewed the patient's chart including prior progress notes, procedures and testing.     ROS:   Review of Systems    PROBLEM LIST:  Patient Active Problem List   Diagnosis    Coronary artery disease    Hyperlipemia    Essential hypertension    Type 2 diabetes mellitus with microalbuminuria, without long-term current use of insulin    Morbid obesity with body mass index (BMI) greater than or equal to 50    Elevated blood  pressure reading with diagnosis of hypertension    Lower leg edema    NICOLE (obstructive sleep apnea)    Non-rheumatic mitral regurgitation    Diastolic dysfunction    Statin intolerance    Thyroid nodule    Vitamin D deficiency    Hypothyroidism    Gout    Hyperparathyroidism    Goiter    Thyroiditis    Abnormal thyroid blood test    Nodular thyroid disease    Hyperuricemia    Type 2 diabetes mellitus with hyperglycemia    Proteinuria    Right kidney mass    GERD (gastroesophageal reflux disease)    Pulmonary nodule    Congestive heart failure    SOB (shortness of breath)    S/P coronary artery stent placement    History of breast cancer in female    Left mastectomy site mass    Obesity, Class III, BMI 40-49.9 (morbid obesity)    Osteopenia    Postmenopausal    Pulmonary hypertension    Heart failure with preserved ejection fraction    Long term (current) use of antithrombotics/antiplatelets    Diastolic heart failure secondary to hypertension    Osteoarthritis of multiple joints    Vitamin D deficient osteomalacia    Hypoparathyroidism due to impaired secretion of parathyroid hormone (PTH)    Acute on chronic systolic CHF (congestive heart failure)    Elevated BUN    Long term current use of amiodarone    Paroxysmal atrial fibrillation    Situational mixed anxiety and depressive disorder    Iron deficiency anemia    Chronic back pain    Peripheral vascular disease, unspecified    Arthralgia of multiple joints    Drug side effects, initial encounter    Renal cell carcinoma of right kidney    Benign hypertensive heart and kidney disease with CHF, NYHA class 2 and CKD stage 4    CKD stage 4 secondary to hypertension    H/O right nephrectomy    Hypomagnesemia    Chronic kidney disease (CKD), stage V    Chronic combined systolic and diastolic heart failure    Chronic renal failure (CRF), stage 4 (severe)    Myalgia due to statin    Hyperparathyroidism, secondary renal    CKD (chronic kidney disease), stage IV    Acute  hypoxic respiratory failure    Morbid obesity    CHF (congestive heart failure)    Cardiac arrest    Severe sepsis    Goals of care, counseling/discussion         HISTORY  Past Medical History:   Diagnosis Date    Anticoagulant long-term use     Arthritis     Breast cancer 2014    invasive lobular carcinoma    Cancer of kidney 11/2020    RIGHT KIDNEY CANCER    CHF (congestive heart failure)     Coronary artery disease dx 2005    Depression     Diabetes mellitus     Diastolic heart failure secondary to hypertension     Gout     Hyperlipemia     Hypertension     Hypertrophy of nasal turbinates     Kidney mass 2020    Right    Levoscoliosis     Lung nodule     left    Multiple thyroid nodules     NICOLE (obstructive sleep apnea)     uses C-PAP    Pulmonary hypertension        Past Surgical History:   Procedure Laterality Date    AORTOGRAPHY N/A 12/04/2020    Procedure: Aortogram;  Surgeon: Paul Pedersen MD;  Location: [facility] [place] CATH;  Service: Cardiology;  Laterality: N/A;    BREAST SURGERY      CARDIAC CATHETERIZATION  12/2020    CHOLECYSTECTOMY      COLONOSCOPY      multi -last 2014     CORONARY ARTERY BYPASS GRAFT      ESOPHAGOGASTRODUODENOSCOPY      2012     HAND SURGERY Right     INSERTION OF CATHETER N/A 6/23/2024    Procedure: Insertion,catheter;  Surgeon: Meli Griffin MD;  Location: [facility] OR;  Service: Vascular;  Laterality: N/A;  ORIGINAL CATHETER WAS REPOSITIONED.  NO NEW CATHETER WAS PLACED    INSERTION, CATHETER, DIALYSIS, PERITONEAL N/A 6/4/2024    Procedure: IN Insertion Catheter, Dialysis, Peritoneal - laparoscopic;  Surgeon: Rodriguez Catalan Jr., MD;  Location: [facility] OR;  Service: General;  Laterality: N/A;    LAPAROSCOPIC ROBOT-ASSISTED SURGICAL REMOVAL OF KIDNEY USING DA CHELLE XI Right 03/10/2022    Procedure: XI ROBOTIC NEPHRECTOMY- radical;  Surgeon: Rolando Ramirez MD;  Location: [facility] [place] OR;  Service: Urology;  Laterality: Right;    MASTECTOMY W/ SENTINEL NODE BIOPSY Bilateral 01/21/2015     "bilateral "dog ears"    NASAL SINUS SURGERY      Dr Bryant FESS/cauterization turbinate     PARTIAL HYSTERECTOMY      PERCUTANEOUS TRANSLUMINAL BALLOON ANGIOPLASTY OF CORONARY ARTERY  2020    Procedure: Angioplasty-coronary;  Surgeon: Paul Pedersen MD;  Location: [facility] CATH;  Service: Cardiology;;    RENAL BIOPSY Right     2021 EJ    TUBAL LIGATION      ULTRASOUND GUIDANCE  2020    Procedure: Ultrasound Guidance;  Surgeon: Paul Pedersen MD;  Location: [facility] CATH;  Service: Cardiology;;       Social History     Tobacco Use    Smoking status: Former     Current packs/day: 0.00     Types: Cigarettes     Start date: 2016     Quit date: 2016     Years since quittin.5    Smokeless tobacco: Never    Tobacco comments:     quit    Substance Use Topics    Alcohol use: No    Drug use: No       Family History   Problem Relation Name Age of Onset    Breast cancer Mother      Stroke Father Waqar Sr.     Hypertension Father Waqar SrHanny     Hepatitis Brother      Asthma Daughter Nell Taylor     Birth defects Daughter Nell Camejo's two children has cleft lips    Depression Daughter Nell Taylor     Drug abuse Daughter Nell Taylor     Learning disabilities Bam Taylor     Mental illness Bam Taylor     Breast cancer Maternal Aunt      Glaucoma Sister      Drug abuse Daughter Nell     Macular degeneration Neg Hx      Retinal detachment Neg Hx           MEDS:  No current facility-administered medications on file prior to encounter.     Current Outpatient Medications on File Prior to Encounter   Medication Sig Dispense Refill    allopurinoL (ZYLOPRIM) 300 MG tablet Take 1 tablet (300 mg total) by mouth once daily. 90 tablet 3    amLODIPine (NORVASC) 10 MG tablet Take 10 mg by mouth once daily.      calcitRIOL (ROCALTROL) 0.5 MCG Cap Take 0.5 mcg by mouth once daily.      carvediloL (COREG) 25 MG tablet Take 1 tablet (25 mg total) by " mouth 2 (two) times daily. 180 tablet 2    cyanocobalamin (VITAMIN B-12) 100 MCG tablet Take 100 mcg by mouth once daily.      evolocumab (REPATHA SURECLICK) 140 mg/mL PnIj Inject 1 mL (140 mg total) into the skin every 14 (fourteen) days. 2 mL 11    furosemide (LASIX) 40 MG tablet Take 40 mg by mouth once daily.      loratadine (CLARITIN) 10 mg tablet Take 10 mg by mouth once daily.      magnesium oxide (MAG-OX) 400 mg (241.3 mg magnesium) tablet Take 1 tablet (400 mg total) by mouth daily as needed (cramping). 30 tablet 0    blood sugar diagnostic (TRUE METRIX GLUCOSE TEST STRIP) Strp Use 1x daily. Insurance preferred. 100 strip 3    blood sugar diagnostic Strp To check BG 1 time daily, to use with insurance preferred meter 100 strip 3    cloNIDine (CATAPRES) 0.1 MG tablet Take 1 tablet (0.1 mg total) by mouth 3 (three) times daily as needed (PRN SBP > 165 mmHg). 90 tablet 6    cloNIDine 0.1 mg/24 hr td ptwk (CATAPRES) 0.1 mg/24 hr Place 1 patch onto the skin every 7 days. 4 patch 4    lancets Misc To check BG 1 times daily, to use with insurance preferred meter 100 each 3    nitroGLYCERIN (NITROSTAT) 0.4 MG SL tablet Place 1 tablet (0.4 mg total) under the tongue every 5 (five) minutes as needed for Chest pain. 25 tablet 0    [DISCONTINUED] aspirin (ECOTRIN) 81 MG EC tablet Take 1 tablet (81 mg total) by mouth once daily. 90 tablet 3    [DISCONTINUED] DULoxetine (CYMBALTA) 20 MG capsule TAKE ONE CAPSULE BY MOUTH ONCE DAILY 30 capsule 4    [DISCONTINUED] folic acid (FOLVITE) 1 MG tablet Take 1 mg by mouth once daily.      [DISCONTINUED] metFORMIN (GLUCOPHAGE-XR) 500 MG ER 24hr tablet Take 2 tablets (1,000 mg total) by mouth 2 (two) times daily with meals. 360 tablet 3    [DISCONTINUED] sodium bicarbonate 650 MG tablet Take 1 tablet (650 mg total) by mouth once daily. 30 tablet 11       ALLERGIES:  Review of patient's allergies indicates:   Allergen Reactions    Percocet [oxycodone-acetaminophen] Itching     Amiodarone analogues      Itching      Irbesartan Swelling    Januvia [sitagliptin]     Jardiance [empagliflozin]      Leg cramps    Lipitor [atorvastatin] Other (See Comments)     Severe leg pain    Linaclotide Other (See Comments) and Nausea And Vomiting     Does not remember    Lubiprostone Other (See Comments) and Palpitations     Does not remember         VITALS:  Temp:  [97 °F (36.1 °C)-98.6 °F (37 °C)] 98.6 °F (37 °C)  Pulse:  [] 85  Resp:  [18-34] 21  SpO2:  [78 %-100 %] 96 %  BP: ()/() 131/97    I/O last 3 completed shifts:  In: 716.7 [P.O.:350; I.V.:66.7; IV Piggyback:300]  Out: -       PHYSICAL EXAM:  Physical Exam  Vitals reviewed.   Constitutional:       Appearance: She is obese. She is ill-appearing.   HENT:      Nose:      Comments: Nasal cannula in place  Eyes:      General: No scleral icterus.  Cardiovascular:      Rate and Rhythm: Normal rate and regular rhythm.   Pulmonary:      Effort: No respiratory distress.      Breath sounds: Wheezing present.   Chest:          Comments: This overlying left chest and neck. No obvious underlying hematoma. Catheter insertion site and exit site without active bleeding. Over the clavicle there was an area superficial excoriation possibly from pressure or adhesive bandage. No obvious signs of infection.  Abdominal:      General: There is no distension.      Palpations: Abdomen is soft.      Tenderness: There is no abdominal tenderness.      Comments: PD catheter in place   Musculoskeletal:         General: Normal range of motion.      Cervical back: Normal range of motion and neck supple. No rigidity.      Right lower leg: Edema present.      Left lower leg: Edema present.   Skin:     General: Skin is warm.      Findings: Bruising and lesion present.   Neurological:      General: No focal deficit present.      Mental Status: She is alert.      Motor: Weakness present.   Psychiatric:         Behavior: Behavior normal.           LABS:  Lab Results    Component Value Date    WBC 10.98 07/02/2024    RBC 4.44 07/02/2024    HGB 11.3 (L) 07/02/2024    HCT 39.7 07/02/2024    HCT 39 06/27/2024     07/02/2024     Lab Results   Component Value Date     07/02/2024     07/02/2024    K 4.7 07/02/2024     07/02/2024    CO2 18 (L) 07/02/2024    BUN 72 (H) 07/02/2024    CREATININE 6.2 (H) 07/02/2024    CALCIUM 8.7 07/02/2024     Lab Results   Component Value Date    ALT 7 (L) 06/29/2024    AST 17 06/29/2024    ALKPHOS 96 06/29/2024    BILITOT 1.1 (H) 06/29/2024     Lab Results   Component Value Date    MG 2.4 07/02/2024    PHOS 4.2 06/29/2024       STUDIES:  Images and reports were personally reviewed.    Chest x-ray 06/28/2024  FINDINGS:  Portable chest at 04:32 is compared to prior study dated 06/27/2024 shows cardiomegaly.  The left subclavian vein dual port catheter is in stable position.     There is improved aeration of the lungs with improvement of the vascular congestion.  There are tiny pleural effusions.  There is no pneumothorax.  There are no acute osseous abnormalities.     Impression:     Moderate cardiomegaly with improvement of the pulmonary congestion     Tiny pleural effusions      ASSESSMENT & PLAN:  74 y.o. female with end-stage renal disease, fluid overload   -patient has agreed proceed with hemodialysis  -anticipate need for hemodialysis for least a few weeks, may later transition off hemodialysis peritoneal dialysis training it was completed and it functioned appropriately   -patient has eaten today therefore will plan for catheter placement tomorrow, will make NPO at midnight   -blood cultures and catheter tip culture negative from 06/29/2024, feel comfortable proceeding with placement of tunneled catheter  -ideally will place on the right side however there was report that the right vein was not suitable though I can not find any documentation to reflect this, will obtain bilateral jugular ultrasound to evaluate for patency,  if right side is patent will attempt placement on that side, if not will attempt placement on left side with plans to avoid previous catheter tract and exit site   -decision to transfer patient to [facility] for further high-risk cardiac evaluation would be dependent on primary team, Cardiology and accepting teams

## 2024-07-02 NOTE — PROGRESS NOTES
"Met with pt and family at bedside to attempt to discuss GOC moving forward. The pt is very fixated on the grievances of her unsatisfactory hospital stay and cannot see past this to have a GOC discussion. I'm not confident she has capacity for complex medical decisions at this time. Daughter Maria Luisa and Niece April present at bedside and daughter Nell on speaker phone. They are making pt's medical decisions together as a team. Family requesting to meet with pt advocate. They are demanding transfer to [facility] for angiogram and dialysis cath placement. [facility] reports cath placement needs to be done here prior to transfer yet family is refusing. They are not confident in the staff here to place it correctly due to issues in the past. Educated family that because of this the transfer is labeled as "family request" and is not emergent. Due to this it could take days to find a bed. They verbalized understanding. Pt is not agreeable to hospice at this time. She does not wish to stop dialysis. Even though she is very eager to go home family is pushing her to continue treatment and she is agreeable today.     Dr. Romero to follow up tomorrow.   "

## 2024-07-02 NOTE — RESPIRATORY THERAPY
07/01/24 2003   Patient Assessment/Suction   Level of Consciousness (AVPU) alert   Respiratory Effort Normal;Unlabored   Expansion/Accessory Muscles/Retractions no retractions;no use of accessory muscles   All Lung Fields Breath Sounds Anterior:;diminished   Rhythm/Pattern, Respiratory no shortness of breath reported;unlabored   Cough Frequency infrequent   PRE-TX-O2   Device (Oxygen Therapy) high flow nasal cannula   Flow (L/min) (Oxygen Therapy) 2   SpO2 98 %   Pulse Oximetry Type Continuous   $ Pulse Oximetry - Multiple Charge Pulse Oximetry - Multiple   Pulse (!) 117   Resp (!) 21   Positioning HOB elevated 30 degrees   Education   $ Education 15 min;Other (see comment)

## 2024-07-02 NOTE — PLAN OF CARE
Problem: Occupational Therapy  Goal: Occupational Therapy Goal  Description: Goals to be met by: 7/20/2024     Patient will increase functional independence with ADLs by performing:    UE Dressing with Supervision.  LE Dressing with Mod A.  Grooming while seated min A.   Toileting from toilet with mod A for hygiene and clothing management.   Patient will tolerance 2 minutes of therapeutic activities displaying fair endurance for improved independence during ADLs.     Outcome: Progressing

## 2024-07-02 NOTE — SUBJECTIVE & OBJECTIVE
Interval History: Patient is sitting up in the chair. She is frustrated. States she wants to continue dialysis. Also says she wants to go home. States no one tells her what's going on. Also imply that she doesn't want team to be talking to her family and not her. They have requested to be transferred to [facility] for dialysis catheter placement and angiogram.     Review of Systems  Objective:     Vital Signs (Most Recent):  Temp: 98.6 °F (37 °C) (07/02/24 1130)  Pulse: 85 (07/02/24 1400)  Resp: (!) 21 (07/02/24 1400)  BP: (!) 131/97 (07/02/24 1142)  SpO2: 96 % (07/02/24 1400) Vital Signs (24h Range):  Temp:  [97 °F (36.1 °C)-98.6 °F (37 °C)] 98.6 °F (37 °C)  Pulse:  [] 85  Resp:  [18-34] 21  SpO2:  [78 %-100 %] 96 %  BP: ()/() 131/97     Weight: 132.3 kg (291 lb 10.7 oz)  Body mass index is 45.68 kg/m².    Intake/Output Summary (Last 24 hours) at 7/2/2024 1437  Last data filed at 7/2/2024 1300  Gross per 24 hour   Intake 516.74 ml   Output --   Net 516.74 ml         Physical Exam  Vitals and nursing note reviewed.   HENT:      Head: Normocephalic and atraumatic.   Eyes:      Conjunctiva/sclera: Conjunctivae normal.   Neck:      Vascular: No JVD. There is bruising over the previous trialysis catheter, which is now removed.   Cardiovascular:      Heart sounds: Normal heart sounds.   Pulmonary:      Effort: Pulmonary effort is normal.      Comments: Decreased   Abdominal:      Palpations: Abdomen is soft. There if PD catheter.   Musculoskeletal:         General: Normal range of motion.   Skin:     General: Skin is warm.   Neurological:      Mental Status: She is alert and oriented to person, place, and time.   Psychiatric:         Mood and Affect: Mood normal.        Significant Labs: All pertinent labs within the past 24 hours have been reviewed.  CBC:   Recent Labs   Lab 07/01/24  0405 07/02/24  0326   WBC 12.89* 10.98   HGB 10.4* 11.3*   HCT 36.1* 39.7    151     CMP:   Recent Labs   Lab  07/01/24  0405 07/02/24  0326    136   K 4.4 4.7    105   CO2 20* 18*   * 108   BUN 57* 72*   CREATININE 5.6* 6.2*   CALCIUM 8.6* 8.7   ANIONGAP 12 13       Significant Imaging: I have reviewed all pertinent imaging results/findings within the past 24 hours.

## 2024-07-02 NOTE — ASSESSMENT & PLAN NOTE
Acute worsening HFrEF  EF 25% with elevated BNP  Will need lifevest and AICD as OP   S/p tunnel line and HD from there . Needed to remove because of possible line infection . Line culture sent   Amiodarone was on hold for bradycardia and possible allergy   Now on Cardizem per cardiology

## 2024-07-02 NOTE — ASSESSMENT & PLAN NOTE
Patient has severe fluid overload secondary to ESRD, cardiac arrest in HD hemodialysis  Likely multifactorial from CHF with worsening CKD  EF 40-45%  in previous ECHO but repeat with 25% with elevated BNP  Status post tunnel line 6/22 and removed 6/29 because of superficial infection   Intubated with cardiac arrest 6/24 and extubated 6/27  HD as per nephrology   Wean oxygen

## 2024-07-02 NOTE — NURSING
Pt attempting to sit on the side of bed. Helped pt to sit on the side of the bed properly. Pt not able to be redirected back into the bed after sitting on side of bed for 30 minutes. Was able to get the pt into the reclining chair beside bed. Pt very upset, being verbally abusive to staff and attempting to swat at staff.

## 2024-07-02 NOTE — ASSESSMENT & PLAN NOTE
Patient had PD cath placed 2 weeks ago however due to insurance situation never received dialysis  Tunneled dialysis catheter was placed 6/22  Revision 6/23  Patient has persistent oozing from dialysis catheter  and DDAVP given and now stopped   HD as per nephrology   Tunnel catheter needed to removed on 6/29 because of possible infection . Cultures neg  - follow plans from nephrology for further dialysis, patient has agreed for another line placement per nephrology. Vascular has been consulted.

## 2024-07-02 NOTE — PROGRESS NOTES
Pulmonary/Critical Care   Progress Note      PATIENT NAME: Juhi Taylor  MRN: [id number]  TODAY'S DATE: 2024  5:28 PM  ADMIT DATE: 2024  AGE: 74 y.o. : 1950    CONSULT REQUESTED BY: Caleb Quintero MD    REASON FOR CONSULT:   Cardiac arrest     HPI:  Ms Taylor is a 73-year-old woman who presents with cardiac arrest.    Code blue was called on  evening, she was unresponsive.  Chest compressions were already underway.  Rhythm was consistent with PE a arrest initially, after giving epinephrine, the rhythm appeared to change to ventricular tachycardia.  We cardioverted and patient achieved ROSC.  Additionallly she received bicarbonate and glucose.     Today:   Did well with SBT- tolerating dialysis.     2024 0 Stable overnight, has been tolerating dialysis.  Her mentation is cloudy.  No respiratory complaints and she remains stable extubated. CXR with CM, better edema, ECHO noted with EF 25%    2024 - STable overnight, no new respiratory complaints reported.  Back in AF with increased HR (being addressed), LE dopplers negative for DVT    2024 - STable overnight, no new respiratory complaints.  AF is still and issue and HR is increased.  No new respiratory complaints but does have some congestion    24- doing well, Trialysis catheter was removed, she is awake alert, afebrile,     24- looks very good today, she is up in chair and speaking well. We had a long discussion- she was logical in her thought process but told me that she did not wish to remain in the hospital anymore.     Review of Systems   Constitutional:  Positive for activity change. Negative for appetite change, chills, fatigue and fever.   HENT:  Positive for congestion. Negative for hearing loss, nosebleeds, postnasal drip, sneezing and sore throat.    Respiratory:  Positive for cough and shortness of breath. Negative for apnea, choking, chest tightness, wheezing and stridor.     Cardiovascular:  Positive for leg swelling. Negative for chest pain and palpitations.   Gastrointestinal:  Negative for abdominal distention, abdominal pain, constipation, diarrhea and nausea.   Genitourinary:  Negative for dysuria, frequency and urgency.   Musculoskeletal:  Negative for arthralgias and myalgias.   Neurological:  Positive for weakness. Negative for syncope and numbness.   Hematological:  Negative for adenopathy.   Psychiatric/Behavioral:  Negative for dysphoric mood. The patient is not nervous/anxious.            ALLERGIES  Review of patient's allergies indicates:   Allergen Reactions    Percocet [oxycodone-acetaminophen] Itching    Amiodarone analogues      Itching      Irbesartan Swelling    Januvia [sitagliptin]     Jardiance [empagliflozin]      Leg cramps    Lipitor [atorvastatin] Other (See Comments)     Severe leg pain    Linaclotide Other (See Comments) and Nausea And Vomiting     Does not remember    Lubiprostone Other (See Comments) and Palpitations     Does not remember       INPATIENT SCHEDULED MEDICATIONS   ceFEPime IV (PEDS and ADULTS)  2 g Intravenous Q24H    desmopressin (DDAVP) 39.692 mcg in 0.9% NaCl 50 mL IVPB  0.3 mcg/kg Intravenous Once    epoetin alaina-epbx  50 Units/kg Intravenous Every Mon, Wed, Fri    famotidine  20 mg Oral Daily    furosemide (LASIX) injection  40 mg Intravenous Q12H    guaiFENesin 100 mg/5 ml  400 mg Oral BID    heparin (porcine)  5,000 Units Subcutaneous Q8H    metoprolol tartrate  25 mg Oral BID      dilTIAZem  5 mg/hr Intravenous Continuous 5 mL/hr at 07/02/24 1338 5 mg/hr at 07/02/24 1338         MEDICAL AND SURGICAL HISTORY  Past Medical History:   Diagnosis Date    Anticoagulant long-term use     Arthritis     Breast cancer 2014    invasive lobular carcinoma    Cancer of kidney 11/2020    RIGHT KIDNEY CANCER    CHF (congestive heart failure)     Coronary artery disease dx 2005    Depression     Diabetes mellitus     Diastolic heart failure secondary  "to hypertension     Gout     Hyperlipemia     Hypertension     Hypertrophy of nasal turbinates     Kidney mass 2020    Right    Levoscoliosis     Lung nodule     left    Multiple thyroid nodules     NICOLE (obstructive sleep apnea)     uses C-PAP    Pulmonary hypertension      Past Surgical History:   Procedure Laterality Date    AORTOGRAPHY N/A 12/04/2020    Procedure: Aortogram;  Surgeon: Paul Pedersen MD;  Location: [facility] CATH;  Service: Cardiology;  Laterality: N/A;    BREAST SURGERY      CARDIAC CATHETERIZATION  12/2020    CHOLECYSTECTOMY      COLONOSCOPY      multi -last 2014     CORONARY ARTERY BYPASS GRAFT      ESOPHAGOGASTRODUODENOSCOPY      2012     HAND SURGERY Right     INSERTION OF CATHETER N/A 6/23/2024    Procedure: Insertion,catheter;  Surgeon: Meli rGiffin MD;  Location: [facility] OR;  Service: Vascular;  Laterality: N/A;  ORIGINAL CATHETER WAS REPOSITIONED.  NO NEW CATHETER WAS PLACED    INSERTION, CATHETER, DIALYSIS, PERITONEAL N/A 6/4/2024    Procedure: IN Insertion Catheter, Dialysis, Peritoneal - laparoscopic;  Surgeon: Rodriguez Catalan Jr., MD;  Location: [facility] OR;  Service: General;  Laterality: N/A;    LAPAROSCOPIC ROBOT-ASSISTED SURGICAL REMOVAL OF KIDNEY USING DA CHELLE XI Right 03/10/2022    Procedure: XI ROBOTIC NEPHRECTOMY- radical;  Surgeon: Rolando Ramirez MD;  Location: [facility] OR;  Service: Urology;  Laterality: Right;    MASTECTOMY W/ SENTINEL NODE BIOPSY Bilateral 01/21/2015    bilateral "dog ears"    NASAL SINUS SURGERY  2015    Dr Bryant FESS/cauterization turbinate     PARTIAL HYSTERECTOMY  1989    PERCUTANEOUS TRANSLUMINAL BALLOON ANGIOPLASTY OF CORONARY ARTERY  12/04/2020    Procedure: Angioplasty-coronary;  Surgeon: Paul Pedersen MD;  Location: [facility] CATH;  Service: Cardiology;;    RENAL BIOPSY Right     9/20/2021 EJ    TUBAL LIGATION      ULTRASOUND GUIDANCE  12/04/2020    Procedure: Ultrasound Guidance;  Surgeon: Paul Pedersen MD;  Location: [facility] CATH;  Service: " Cardiology;;       ALCOHOL, TOBACCO AND DRUG USE  Social History     Tobacco Use   Smoking Status Former    Current packs/day: 0.00    Types: Cigarettes    Start date: 2016    Quit date: 2016    Years since quittin.5   Smokeless Tobacco Never   Tobacco Comments    quit      Social History     Substance and Sexual Activity   Alcohol Use No     Social History     Substance and Sexual Activity   Drug Use No       FAMILY HISTORY  Family History   Problem Relation Name Age of Onset    Breast cancer Mother      Stroke Father Waqar Sr.     Hypertension Father Waqar Sr.     Hepatitis Brother      Asthma Daughter Nell Taylor     Birth defects Daughter Nell Camejo's two children has cleft lips    Depression Daughter Nell Taylor     Drug abuse Daughter Nell Taylor     Learning disabilities Bam Taylor     Mental illness Bam Taylor     Breast cancer Maternal Aunt      Glaucoma Sister      Drug abuse Daughter Nell     Macular degeneration Neg Hx      Retinal detachment Neg Hx         VITAL SIGNS (MOST RECENT)  Temp: 98.6 °F (37 °C) (24 1130)  Pulse: 95 (24 1300)  Resp: (!) 24 (24 1300)  BP: (!) 131/97 (24 1142)  SpO2: 97 % (24 1300)    INTAKE AND OUTPUT (LAST 24 HOURS):  Intake/Output Summary (Last 24 hours) at 2024 1418  Last data filed at 2024 1300  Gross per 24 hour   Intake 516.74 ml   Output --   Net 516.74 ml       WEIGHT  Wt Readings from Last 1 Encounters:   24 132.3 kg (291 lb 10.7 oz)       Physical Exam  Vitals reviewed.   Constitutional:       General: She is not in acute distress.     Appearance: She is well-developed. She is obese. She is ill-appearing (chronic). She is not toxic-appearing or diaphoretic.   HENT:      Head: Normocephalic and atraumatic.      Mouth/Throat:      Pharynx: No oropharyngeal exudate or posterior oropharyngeal erythema.   Eyes:      General: No scleral icterus.     " Pupils: Pupils are equal, round, and reactive to light.   Neck:      Vascular: No JVD.   Cardiovascular:      Rate and Rhythm: Normal rate and regular rhythm.      Heart sounds: Normal heart sounds. No murmur heard.  Pulmonary:      Effort: Pulmonary effort is normal. No respiratory distress.      Breath sounds: No wheezing or rales.      Comments: Decreased at bases  Abdominal:      General: Bowel sounds are normal. There is distension.      Palpations: Abdomen is soft.      Tenderness: There is no abdominal tenderness.   Musculoskeletal:         General: No swelling.      Cervical back: Normal range of motion and neck supple. No rigidity.      Right lower leg: Edema present.      Left lower leg: Edema present.   Skin:     General: Skin is warm.      Capillary Refill: Capillary refill takes less than 2 seconds.      Coloration: Skin is not pale.      Findings: No rash.   Neurological:      General: No focal deficit present.      Mental Status: She is alert and oriented to person, place, and time.      Cranial Nerves: No cranial nerve deficit.      Motor: Weakness present. No abnormal muscle tone.   Psychiatric:      Comments: Calm            ACUTE PHASE REACTANT (LAST 24 HOURS)  No results for input(s): "FERRITIN", "CRP", "LDH", "DDIMER" in the last 24 hours.    CBC LAST (LAST 24 HOURS)  Recent Labs   Lab 07/02/24  0326   WBC 10.98   RBC 4.44   HGB 11.3*   HCT 39.7   MCV 89   MCH 25.5*   MCHC 28.5*   RDW 20.9*      MPV 10.2   GRAN 64.9  7.1   LYMPH 18.3  2.0   MONO 13.8  1.5*   BASO 0.07   NRBC 0       CHEMISTRY LAST (LAST 24 HOURS)  Recent Labs   Lab 07/02/24  0326      K 4.7      CO2 18*   ANIONGAP 13   BUN 72*   CREATININE 6.2*      CALCIUM 8.7   MG 2.4       COAGULATION LAST (LAST 24 HOURS)  No results for input(s): "LABPT", "INR", "APTT" in the last 24 hours.      CARDIAC PROFILE (LAST 24 HOURS)  Recent Labs   Lab 06/27/24  0045   *   TROPONINIHS 52.4*       LAST 7 DAYS " MICROBIOLOGY   Microbiology Results (last 7 days)       Procedure Component Value Units Date/Time    IV catheter culture [[id number]] Collected: 06/29/24 2255    Order Status: Completed Specimen: Catheter Tip, Subclavian Updated: 07/02/24 0858     Aerobic Culture - Cath tip No growth    Narrative:      Left subclavian HD catheter    Blood culture [[id number]] Collected: 06/29/24 1607    Order Status: Completed Specimen: Blood Updated: 07/01/24 1832     Blood Culture, Routine No Growth to date      No Growth to date      No Growth to date    Blood culture [[id number]] Collected: 06/29/24 1607    Order Status: Completed Specimen: Blood Updated: 07/01/24 1832     Blood Culture, Routine No Growth to date      No Growth to date      No Growth to date    Blood culture [[id number]] Collected: 06/24/24 2016    Order Status: Completed Specimen: Blood from Peripheral, Hand, Right Updated: 06/29/24 2232     Blood Culture, Routine No growth after 5 days.    Culture, Respiratory with Gram Stain [[id number]] Collected: 06/24/24 1848    Order Status: Completed Specimen: Respiratory from Sputum, Expectorated Updated: 06/26/24 0918     Respiratory Culture No normal respiratory lesley     Gram Stain (Respiratory) <10 epithelial cells per low power field.     Gram Stain (Respiratory) Few WBC's     Gram Stain (Respiratory) No organisms seen            MOST RECENT IMAGING  US Carotid Bilateral  Narrative: EXAMINATION:  US CAROTID BILATERAL    CLINICAL HISTORY:  cardiac arrest; coronary artery disease    TECHNIQUE:  Grayscale, color and spectral Doppler analysis was performed. Criteria for stenosis is based upon the Society of Radiologists in Ultrasound Consensus Conference, 2003 (Radiology, November 2003, 229, 340-346). This is in accordance with NASCET criteria.    FINDINGS:  Comparison to prior exams.  Grayscale images show diffuse hypoechoic carotid intimal hyperplasia.    Peak systolic velocity in the proximal right ICA is 48.1  cm/sec, and in the distal right ICA 76.8 cm/sec, with ICA/CCA ratio of 1.4.    Peak systolic velocity in the proximal left ICA is 39 cm/sec, and in the distal left ICA 40.3 cm/sec, with ICA/CCA ratio of 1.1.    The vertebral arteries are patent, with normal waveforms, and normal antegrade flow bilaterally.  Impression: 1. No findings of hemodynamically significant carotid arterial stenosis or vascular occlusion.    2. Patent vertebral arteries with antegrade flow bilaterally.    Electronically signed by: Niko Mendiola  Date:    06/29/2024  Time:    13:18  US Lower Extremity Veins Bilateral  Narrative: EXAMINATION:  US LOWER EXTREMITY VEINS BILATERAL    CLINICAL HISTORY:  de oxygenation and swelling;    FINDINGS:  Grayscale, color and spectral Doppler analysis of the bilateral lower extremity deep venous system was performed. Comparison to prior exams.    There is normal compressibility, with normal flow by color and spectral Doppler analysis in the bilateral lower extremity deep venous system.  There is normal augmentation and no evidence of deep venous thrombosis.    There is a 50 x 28 x 24 mm fusiform, heterogeneously echoic complex cyst in the right popliteal fossa, with increased through transmission of sound, and no internal color Doppler vascular flow.  Impression: 1. Negative for lower extremity deep venous thrombosis.  2. Right popliteal fossa complex Baker's cyst.    Electronically signed by: Niko Mendiola  Date:    06/29/2024  Time:    13:15      CURRENT VISIT EKG  Results for orders placed or performed during the hospital encounter of 06/18/24   EKG 12-lead    Narrative    Ordered by an unspecified provider.       ECHOCARDIOGRAM RESULTS  Results for orders placed during the hospital encounter of 06/18/24    Echo    Interpretation Summary    Left Ventricle: The left ventricle is moderately dilated. Moderately increased wall thickness. There is moderate concentric hypertrophy. Moderate global hypokinesis  present. There is mildly reduced systolic function with a visually estimated ejection fraction of 40 - 45%. Unable to assess diastolic function due to atrial fibrillation.    Right Ventricle: Normal right ventricular cavity size. Wall thickness is normal. Systolic function is borderline low.    Left Atrium: Left atrium is mildly dilated.    Right Atrium: Right atrium is mildly dilated.    Mitral Valve: There is mild regurgitation with a centrally directed jet.    Tricuspid Valve: There is mild regurgitation with a centrally directed jet.    Pulmonary Artery: There is moderate to severe pulmonary hypertension. The estimated pulmonary artery systolic pressure is 56 mmHg.    IVC/SVC: Elevated venous pressure at 15 mmHg.        VENTILATOR INFORMATION              LAST ARTERIAL BLOOD GAS  ABG  Recent Labs   Lab 06/27/24  0539   PH 7.434   PO2 78*   PCO2 34.3*   HCO3 23.0*   BE -1       ASSESSMENT:   Cardiac arrest  Hyperkalemia   Hx of vascular disease   UTI  NICOLE and probable OHS   Chronic hypercapneic respiratory failure   7.   Ventricular tachycardia   8.  Thrombocytopenia     Still not clear on etiology of arrest but may be a NSTEMI precipitated by sepsis and shock due to suspected transient bacteremia related to trialysis catheter or potentially cardiac arrest triggered by dialysis.  ECHO has evidence of reduced EF. Doing well. She is awake and alert sitting up in chair and communicating. On my conversation with the patient this morning expressed that she does not wish for more aggressive interventions and wants to be in the comfort of her own home. On my evaluation at that time I felt that she had capacity.     The patient is at high risk for further decompensation. She will require temporary dialysis catheter and eventually long term access. Based on our conversation this morning- I doubt she would want long term dialysis. She has evidence of heart failure with reduced EF- in the setting of renal failure her  prognosis is even further limited. I doubt that a left heart cath will change her outcome- I don't think she had a STEMI.  I am not sure what we are really going to achieve with continuation of aggressive therapies, especially when patient has indicated she does not wish for these things.     PLAN:   - Blood cultures negative- ok to deescalate antibiotics  - Would recommend bipap therapy at night if she is agreeable  - Cardiology has attempted to obtain better control of her heart rate but patient has refused certain treatments.   - Appreciate nephrology and cardiology consultants assistance   - Defer management of NSTEMI, AF and EF to cardiology, may require AICD per cardiology team due to reduced EF  - try to increase activity, PT/OT, get out of bed and up in chair  - Recommend palliative consultation and discussions - the patient has declined many treatments including antiarrythmics, left heart catheterization, trialysis catheter placement, and antiplatelet therapy with plavix. Today- after family discussion the patient apparently agreed to trialysis placement today.   - Plans for transfer to [facility] [place] further interventions- however, I think palliative discussion is warranted in this case.     Seymour Nicole MD  [facility] Pulmonary/Critical Care  07/02/2024

## 2024-07-02 NOTE — ASSESSMENT & PLAN NOTE
Chronic, controlled. Latest blood pressure and vitals reviewed-     Home meds for hypertension were reviewed and noted below.   Hypertension Medications               carvediloL (COREG) 25 MG tablet Take 1 tablet (25 mg total) by mouth 2 (two) times daily.    cloNIDine (CATAPRES) 0.1 MG tablet Take 1 tablet (0.1 mg total) by mouth 3 (three) times daily as needed (PRN SBP > 165 mmHg).    cloNIDine 0.1 mg/24 hr td ptwk (CATAPRES) 0.1 mg/24 hr Place 1 patch onto the skin every 7 days.    furosemide (LASIX) 40 MG tablet Take 40 mg by mouth once daily.    amLODIPine (NORVASC) 10 MG tablet Take 10 mg by mouth once daily.    nitroGLYCERIN (NITROSTAT) 0.4 MG SL tablet Place 1 tablet (0.4 mg total) under the tongue every 5 (five) minutes as needed for Chest pain.          Currently on IV Lasix and  Cardizem drip

## 2024-07-02 NOTE — PROGRESS NOTES
Nephrology Progress Note        Patient Name: Juhi Taylro  MRN: [id number]    Patient Class: IP- Inpatient   Admission Date: 6/18/2024  Length of Stay: 13 days  Date of Service: 7/2/2024    Attending Physician: Caleb Quintero MD  Primary Care Provider: Tony Sadler MD    Reason for Consult: josue    SUBJECTIVE:     HPI: 73F fallowed with Dr. Martinez from nephrology for advanced CKD stage 4-5 getting admitted with fluid overload. Pt had PD catheter insertion 2 weeks ago in preparation for initiation of dialysis, however had post-operative issues with increased leakage. Then her PD in initiation and training was delayed due to insurance issues. She has oliguria/increased weight gain in past 2 weeks, SOB on exertion and cough. Symptoms got worse and she came to ER and got admitted.     I saw her in the ER and recommend trial of lasix gtt, cassia. Will consult Dr. Catalan to evaluate patient and make recommendation for catheter care - we probably will not be able to use it for now for dialysis.    6/19 VSS. Only 175cc UO documented, but may be inaccurately charted - pt boarding in ER. Will increase lasix gtt to 10mg/hr and ensure at least q shift UO documentation.    6/20 VSS. BP low, stop Clonidine patch. Increase lasix gtt to 15mg/hr, add metolazone. Stop Fluconazole in 1-2 days after stopping IV abx for suspected PNA. Appreciate Surgery input - will d/w patient initiation of HD tomorrow if unable to produce much UO and symptomatic improvement...    6/21 VSS. Will discuss initiation of dialysis via HD, she is not responding to diuretics.    6/22 VSS. Plan for HD today after dialysis access placement. Seen on HD, BF around 275 cc/min max, otherwise tolerating well. Will have to be repositioned in cath lab by Dr. Griffin on Monday.    6/23 VSS. Getting HD cath repositioned today, plan HD tomorrow.    6/24 VSS, on 2-3L NC, first HD 6/22, second HD today    6/25 105 min into HD treatment,  patient became lethargic acutely (was conversing/calm)- gave NS bolus- went into PEA arrest- had ACLS with ROSC- net UF 750cc, intubated and transferred to ICU, bradycardic, SBP 110s, FIO2 35%, UOP 100cc, required amio and levophed but now off- mld rise in trop noted, lactic acid normal, CXR clear, echo pending    6/26 tolerated HD yest- got off 2L- echo with EF 20-25% with grade III DD which is much worse compared to < 1 week ago with EF 40-45% suggesting cardiac event- trop peaked- per cards plan for life vest and may need LHC- HR 50s, -160s, FIO2 30%, UOP 550cc  - seen on HD  - bleeding at TDC exit site is better    6/27 tolerated HD well yest- got off 3L- -160, FIO2 30%, UOP 400cc    6/28 tolerated HD with restraints yest- got off 4L, SBP > 150, on 2L NC, ordered 4L UF- niece who is a dialysis nurse asked that we only take off 3L, UOP 245cc  - seen on HD  - conversant, calm- still very edematous    6/29 elevated HR (AF), SBP stable, on 4L NC, no UOP, WBC is going up, CXR yest with improvement in pulm edema, some blisters on her arm getting wound care (infiltrated IV)- ? Infx  source- sitting in chair- lung movement better, c/w edema, more alert today    6/30 TDC with pus collection around area of tunneling- this explains source of infection- truly appreciate Dr. Brunson coming out last night to remove access- remains in AF with RVR- SBP < 140, on 2-4L NC, looks much better today, sitting at edge of bed, more alert, more conversant, asking appropriate questions and giving appropriate responses, still a little confused at times about course of her hospitalization but overall much improved mental status wise    7/1  tired.  Denies chest pain or sob.  Afebrile, tachycardic.  Bp labile   7/2  initially refusing to have trialysis catheter placed.   There was a family meeting with Palliative Care Medicine and she now agrees to proceed with access placement.    Consult placed to Surgery    ASSESSMENT/PLAN:      Sepsis due to infected TDC- access removed 6/29  - initiated vanc and cefepime on 6/29  - 6/29 blood cx from peripheral and TDC tip so far negative  - leukocytosis is improving  - other workup- LE duplex neg for DVT, getting wound care for blisters on R arm where IV infiltrated     CKD stage 5- uremia, hyperkalemia, oliguria  PD cathether in place  Initiated on RRT via IHD this admission using TDC (6/22)  - last HD 6/28  - held HD over weekend due to sepsis requiring TDC removal 6/29.  - consult for trialysis access.  Isovolemic dialysis today  - renal diet, 1.5L fluid restriction  - will need outpatient HD unit placement established prior to discharge  - dose meds for CrCl < 10/HD    PEA/VT arrest during dialysis 6/24- EF 20-25% (new)  AF with RVR  - echo shows newly depressed EF supportive of a cardiac event- could have been precipitated by myocardial stress during dialysis- don't feel that trop rise is c/w an acute NSTEMI however  - patient has declined C - patient is declining plavix- plan for life vest and eventually AICD [facility]  - titrating beta blocker for AF with RVR- patient is declining amiodarone and eliquis- she has agreed to heparin SQ    Baseline HTN  Volume overload/S CHF exacerbation  Hyponatremia  - has been improving with daily UF last week  - since we don't have dialysis access right now, will start lasix 40mg IV BID as she is urinating some.  Output not recorded.  Pt refusing pure wick     HypoK/HypoMg  - will supplement PRN to prevent cardiac disturbance    MBD / Secondary HPT  - phos is at goal- continue renal based nutrition  - last PTH acceptable    Anemia of CKD  Thrombocytopenia  - Hb stable 10-11- continue PONCE with HD  - plat normalized     Thank you for allowing us to participate in the care of your patient!   We will follow the patient and provide recommendations as needed.         Laboratory:  Recent Labs   Lab 06/30/24  3349  "07/01/24  0405 07/02/24  0326    137 136   K 4.4 4.4 4.7    105 105   CO2 21* 20* 18*   BUN 41* 57* 72*   CREATININE 4.3* 5.6* 6.2*   * 114* 108       Recent Labs   Lab 06/27/24  0045 06/27/24  0310 06/28/24  0541 06/29/24  0058 06/30/24  0320 07/01/24  0405 07/02/24  0326   CALCIUM 8.6*  --  8.6* 8.7 8.9 8.6* 8.7   ALBUMIN 3.3*  --  2.9* 3.1*  --   --   --    PHOS 3.7  --   --  4.2  --   --   --    MG 2.0   < > 1.9 2.0 2.2 2.4 2.4    < > = values in this interval not displayed.       Recent Labs   Lab 10/10/23  1113 01/22/24  1133 05/08/24  1139   PTH, Intact 583.1 H 506.7 H 291.7 H       No results for input(s): "POCTGLUCOSE" in the last 168 hours.    Recent Labs   Lab 07/05/23  1533 01/22/24  1133 06/19/24  0443   Hemoglobin A1C 5.6 5.4 6.8 H       Recent Labs   Lab 06/30/24  0320 07/01/24  0405 07/02/24  0326   WBC 15.29* 12.89* 10.98   HGB 10.6* 10.4* 11.3*   HCT 36.8* 36.1* 39.7    196 151   MCV 87 89 89   MCHC 28.8* 28.8* 28.5*   MONO 9.5  1.5* 13.3  1.7* 13.8  1.5*   EOSINOPHIL 1.4 1.9 1.9       Recent Labs   Lab 06/27/24 0045 06/28/24  0541 06/29/24  0058   BILITOT 1.0 1.0 1.1*   BILIDIR 0.4*  --   --    PROT 6.0 5.7* 6.1   ALBUMIN 3.3* 2.9* 3.1*   ALKPHOS 90 86 96   ALT 5* 6* 7*   AST 25 17 17       Recent Labs   Lab 03/17/22  2240 04/07/22  1536 04/17/23  1448 07/14/23 2026 03/14/24  2254 05/09/24  1029 06/23/24  1816   Color, UA Colorless A   < > Colorless A  --  Yellow Yellow  --    Appearance, UA Clear   < > Clear  --  Clear Clear  --    pH, UA 5.0   < > 6.0  --  6.0 6.0  --    Specific Lester, UA 1.010   < > 1.010  --  1.010 1.015  --    Protein, UA Negative   < > 1+ A  --  1+ A 1+ A  --    Glucose, UA Negative   < > Negative  --  Negative Negative  --    Ketones, UA Negative   < > Negative  --  Negative Negative  --    Urobilinogen, UA Negative  --   --   --  Negative  --   --    Bilirubin (UA) Negative   < > Negative  --  Negative Negative  --    Occult Blood UA " Negative   < > Negative  --  TRACE Negative  --    Nitrite, UA Negative   < > Negative  --  Negative Negative  --    RBC, UA  --    < > 3   < > 3 1 >100 H   WBC, UA  --    < > 3   < > 5 1 >100 H   Bacteria  --    < > None   < > None Rare Many A   Hyaline Casts, UA  --    < > 0  --  0 0  --     < > = values in this interval not displayed.       Recent Labs   Lab 06/26/24  1322 06/27/24  0415 06/27/24  0539   POC PH 7.440 7.437 7.434   POC PCO2 39.0 35.8 34.3 L   POC HCO3 26.5 24.1 23.0 L   POC PO2 95 72 L 78 L   POC SATURATED O2 98 95 96   POC BE 2 0 -1   Sample ARTERIAL ARTERIAL ARTERIAL       Microbiology Results (last 7 days)       Procedure Component Value Units Date/Time    IV catheter culture [[id number]] Collected: 06/29/24 2255    Order Status: Completed Specimen: Catheter Tip, Subclavian Updated: 07/02/24 0858     Aerobic Culture - Cath tip No growth    Narrative:      Left subclavian HD catheter    Blood culture [[id number]] Collected: 06/29/24 1607    Order Status: Completed Specimen: Blood Updated: 07/01/24 1832     Blood Culture, Routine No Growth to date      No Growth to date      No Growth to date    Blood culture [[id number]] Collected: 06/29/24 1607    Order Status: Completed Specimen: Blood Updated: 07/01/24 1832     Blood Culture, Routine No Growth to date      No Growth to date      No Growth to date    Blood culture [[id number]] Collected: 06/24/24 2016    Order Status: Completed Specimen: Blood from Peripheral, Hand, Right Updated: 06/29/24 2232     Blood Culture, Routine No growth after 5 days.    Culture, Respiratory with Gram Stain [[id number]] Collected: 06/24/24 1848    Order Status: Completed Specimen: Respiratory from Sputum, Expectorated Updated: 06/26/24 0918     Respiratory Culture No normal respiratory lesley     Gram Stain (Respiratory) <10 epithelial cells per low power field.     Gram Stain (Respiratory) Few WBC's     Gram Stain (Respiratory) No organisms seen            Review of  patient's allergies indicates:   Allergen Reactions    Percocet [oxycodone-acetaminophen] Itching    Amiodarone analogues      Itching      Irbesartan Swelling    Januvia [sitagliptin]     Jardiance [empagliflozin]      Leg cramps    Lipitor [atorvastatin] Other (See Comments)     Severe leg pain    Linaclotide Other (See Comments) and Nausea And Vomiting     Does not remember    Lubiprostone Other (See Comments) and Palpitations     Does not remember       Outpatient meds:  No current facility-administered medications on file prior to encounter.     Current Outpatient Medications on File Prior to Encounter   Medication Sig Dispense Refill    allopurinoL (ZYLOPRIM) 300 MG tablet Take 1 tablet (300 mg total) by mouth once daily. 90 tablet 3    amLODIPine (NORVASC) 10 MG tablet Take 10 mg by mouth once daily.      calcitRIOL (ROCALTROL) 0.5 MCG Cap Take 0.5 mcg by mouth once daily.      carvediloL (COREG) 25 MG tablet Take 1 tablet (25 mg total) by mouth 2 (two) times daily. 180 tablet 2    cyanocobalamin (VITAMIN B-12) 100 MCG tablet Take 100 mcg by mouth once daily.      evolocumab (REPATHA SURECLICK) 140 mg/mL PnIj Inject 1 mL (140 mg total) into the skin every 14 (fourteen) days. 2 mL 11    furosemide (LASIX) 40 MG tablet Take 40 mg by mouth once daily.      loratadine (CLARITIN) 10 mg tablet Take 10 mg by mouth once daily.      magnesium oxide (MAG-OX) 400 mg (241.3 mg magnesium) tablet Take 1 tablet (400 mg total) by mouth daily as needed (cramping). 30 tablet 0    blood sugar diagnostic (TRUE METRIX GLUCOSE TEST STRIP) Strp Use 1x daily. Insurance preferred. 100 strip 3    blood sugar diagnostic Strp To check BG 1 time daily, to use with insurance preferred meter 100 strip 3    cloNIDine (CATAPRES) 0.1 MG tablet Take 1 tablet (0.1 mg total) by mouth 3 (three) times daily as needed (PRN SBP > 165 mmHg). 90 tablet 6    cloNIDine 0.1 mg/24 hr td ptwk (CATAPRES) 0.1 mg/24 hr Place 1 patch onto the skin every 7  days. 4 patch 4    lancets Misc To check BG 1 times daily, to use with insurance preferred meter 100 each 3    nitroGLYCERIN (NITROSTAT) 0.4 MG SL tablet Place 1 tablet (0.4 mg total) under the tongue every 5 (five) minutes as needed for Chest pain. 25 tablet 0    [DISCONTINUED] aspirin (ECOTRIN) 81 MG EC tablet Take 1 tablet (81 mg total) by mouth once daily. 90 tablet 3    [DISCONTINUED] DULoxetine (CYMBALTA) 20 MG capsule TAKE ONE CAPSULE BY MOUTH ONCE DAILY 30 capsule 4    [DISCONTINUED] folic acid (FOLVITE) 1 MG tablet Take 1 mg by mouth once daily.      [DISCONTINUED] metFORMIN (GLUCOPHAGE-XR) 500 MG ER 24hr tablet Take 2 tablets (1,000 mg total) by mouth 2 (two) times daily with meals. 360 tablet 3    [DISCONTINUED] sodium bicarbonate 650 MG tablet Take 1 tablet (650 mg total) by mouth once daily. 30 tablet 11       Scheduled meds:   ceFEPime IV (PEDS and ADULTS)  2 g Intravenous Q24H    epoetin alaina-epbx  50 Units/kg Intravenous Every Mon, Wed, Fri    famotidine  20 mg Oral Daily    furosemide (LASIX) injection  40 mg Intravenous Q12H    guaiFENesin 100 mg/5 ml  400 mg Oral BID    heparin (porcine)  5,000 Units Subcutaneous Q8H    metoprolol tartrate  25 mg Oral BID       Infusions:   dilTIAZem  5 mg/hr Intravenous Continuous 5 mL/hr at 07/02/24 1338 5 mg/hr at 07/02/24 1338           PRN meds:    Current Facility-Administered Medications:     acetaminophen, 650 mg, Oral, Q8H PRN    aluminum-magnesium hydroxide-simethicone, 30 mL, Oral, QID PRN    dextrose 50%, 12.5 g, Intravenous, PRN    dextrose 50%, 25 g, Intravenous, PRN    glucagon (human recombinant), 1 mg, Intramuscular, PRN    glucose, 24 g, Oral, PRN    melatonin, 6 mg, Oral, Nightly PRN    ondansetron, 4 mg, Intravenous, Q6H PRN    Pharmacy to dose Vancomycin consult, , , Once **AND** vancomycin - pharmacy to dose, , Intravenous, pharmacy to manage frequency      OBJECTIVE:     Vital Signs and IO:  Temp:  [97 °F (36.1 °C)-98.6 °F (37 °C)]   Pulse:   []   Resp:  [18-34]   BP: ()/()   SpO2:  [78 %-100 %]   I/O last 3 completed shifts:  In: 716.7 [P.O.:350; I.V.:66.7; IV Piggyback:300]  Out: -   Wt Readings from Last 5 Encounters:   06/29/24 132.3 kg (291 lb 10.7 oz)   06/25/24 (!) 147.9 kg (326 lb)   06/19/24 132.5 kg (292 lb 1.8 oz)   06/04/24 134.3 kg (296 lb)   05/27/24 134.3 kg (296 lb)     Body mass index is 45.68 kg/m².    Physical Exam  Constitutional:       General: Patient is not in acute distress.     Appearance: Patient is well-developed. She is not diaphoretic.   HENT:      Head: Normocephalic and atraumatic.      Mouth/Throat: Mucous membranes are moist.   Eyes:      General: No scleral icterus.     Pupils: Pupils are equal, round, and reactive to light.   Cardiovascular:      Rate and Rhythm: Normal rate and regular rhythm.   Pulmonary:      Effort: Pulmonary effort is normal. No respiratory distress.      Breath sounds: No stridor.   Abdominal:      General: There is no distension.      Palpations: Abdomen is soft.   Musculoskeletal:         General: No deformity. Normal range of motion.      Cervical back: Neck supple.   Skin:     General: Skin is warm and dry.      Findings: No rash present. No erythema.   Neurological:      Mental Status: Patient is alert and oriented to person, place, and time.      Cranial Nerves: No cranial nerve deficit.   Psychiatric:         Behavior: Behavior normal.          Patient care time was spent personally by me on the following activities: > 35 min    Obtaining a history.  Examination of patient.  Providing medical care at the patients bedside.  Developing a treatment plan with patient or surrogate and bedside caregivers.  Ordering and reviewing laboratory studies, radiographic studies, pulse oximetry.  Ordering and performing treatments and interventions.  Evaluation of patient's response to treatment.  Discussions with consultants while on the unit and immediately available to the  patient.  Re-evaluation of the patient's condition.  Documentation in the medical record.     Inder Khalil MD    [facility]  [address] [place] [address]  [place] [address]    [phone] - tel  [phone] - fax    7/2/2024

## 2024-07-02 NOTE — SUBJECTIVE & OBJECTIVE
"Interval History: See HPI    Past Medical History:   Diagnosis Date    Anticoagulant long-term use     Arthritis     Breast cancer 2014    invasive lobular carcinoma    Cancer of kidney 11/2020    RIGHT KIDNEY CANCER    CHF (congestive heart failure)     Coronary artery disease dx 2005    Depression     Diabetes mellitus     Diastolic heart failure secondary to hypertension     Gout     Hyperlipemia     Hypertension     Hypertrophy of nasal turbinates     Kidney mass 2020    Right    Levoscoliosis     Lung nodule     left    Multiple thyroid nodules     NICOLE (obstructive sleep apnea)     uses C-PAP    Pulmonary hypertension        Past Surgical History:   Procedure Laterality Date    AORTOGRAPHY N/A 12/04/2020    Procedure: Aortogram;  Surgeon: Paul Pedersen MD;  Location: [facility] CATH;  Service: Cardiology;  Laterality: N/A;    BREAST SURGERY      CARDIAC CATHETERIZATION  12/2020    CHOLECYSTECTOMY      COLONOSCOPY      multi -last 2014     CORONARY ARTERY BYPASS GRAFT      ESOPHAGOGASTRODUODENOSCOPY      2012     HAND SURGERY Right     INSERTION OF CATHETER N/A 6/23/2024    Procedure: Insertion,catheter;  Surgeon: Meli Griffin MD;  Location: [facility] OR;  Service: Vascular;  Laterality: N/A;  ORIGINAL CATHETER WAS REPOSITIONED.  NO NEW CATHETER WAS PLACED    INSERTION, CATHETER, DIALYSIS, PERITONEAL N/A 6/4/2024    Procedure: IN Insertion Catheter, Dialysis, Peritoneal - laparoscopic;  Surgeon: Rodriguez Catalan Jr., MD;  Location: [facility] OR;  Service: General;  Laterality: N/A;    LAPAROSCOPIC ROBOT-ASSISTED SURGICAL REMOVAL OF KIDNEY USING DA CHELLE XI Right 03/10/2022    Procedure: XI ROBOTIC NEPHRECTOMY- radical;  Surgeon: Rolando Ramirez MD;  Location: [facility] OR;  Service: Urology;  Laterality: Right;    MASTECTOMY W/ SENTINEL NODE BIOPSY Bilateral 01/21/2015    bilateral "dog ears"    NASAL SINUS SURGERY  2015    Dr Bryant FESS/cauterization turbinate     PARTIAL HYSTERECTOMY  1989    PERCUTANEOUS " TRANSLUMINAL BALLOON ANGIOPLASTY OF CORONARY ARTERY  12/04/2020    Procedure: Angioplasty-coronary;  Surgeon: Paul Pedersen MD;  Location: [facility] CATH;  Service: Cardiology;;    RENAL BIOPSY Right     9/20/2021 EJ    TUBAL LIGATION      ULTRASOUND GUIDANCE  12/04/2020    Procedure: Ultrasound Guidance;  Surgeon: Paul Pedersen MD;  Location: [facility] CATH;  Service: Cardiology;;       Review of patient's allergies indicates:   Allergen Reactions    Percocet [oxycodone-acetaminophen] Itching    Amiodarone analogues      Itching      Irbesartan Swelling    Januvia [sitagliptin]     Jardiance [empagliflozin]      Leg cramps    Lipitor [atorvastatin] Other (See Comments)     Severe leg pain    Linaclotide Other (See Comments) and Nausea And Vomiting     Does not remember    Lubiprostone Other (See Comments) and Palpitations     Does not remember       Medications:  Continuous Infusions:   dilTIAZem  5 mg/hr Intravenous Continuous 5 mL/hr at 07/01/24 1809 5 mg/hr at 07/01/24 1809     Scheduled Meds:   ceFEPime IV (PEDS and ADULTS)  2 g Intravenous Q24H    epoetin alaina-epbx  50 Units/kg Intravenous Every Mon, Wed, Fri    famotidine  20 mg Oral Daily    furosemide (LASIX) injection  40 mg Intravenous Q12H    guaiFENesin 100 mg/5 ml  400 mg Oral BID    heparin (porcine)  5,000 Units Subcutaneous Q8H    metoprolol tartrate  25 mg Oral BID     PRN Meds:  Current Facility-Administered Medications:     acetaminophen, 650 mg, Oral, Q8H PRN    aluminum-magnesium hydroxide-simethicone, 30 mL, Oral, QID PRN    dextrose 50%, 12.5 g, Intravenous, PRN    dextrose 50%, 25 g, Intravenous, PRN    glucagon (human recombinant), 1 mg, Intramuscular, PRN    glucose, 24 g, Oral, PRN    melatonin, 6 mg, Oral, Nightly PRN    ondansetron, 4 mg, Intravenous, Q6H PRN    Pharmacy to dose Vancomycin consult, , , Once **AND** vancomycin - pharmacy to dose, , Intravenous, pharmacy to manage frequency    Family History       Problem Relation (Age of Onset)     Asthma Daughter    Birth defects Daughter    Breast cancer Mother, Maternal Aunt    Depression Daughter    Drug abuse Daughter, Daughter    Glaucoma Sister    Hepatitis Brother    Hypertension Father    Learning disabilities Daughter    Mental illness Daughter    Stroke Father          Tobacco Use    Smoking status: Former     Current packs/day: 0.00     Types: Cigarettes     Start date: 2016     Quit date: 2016     Years since quittin.5    Smokeless tobacco: Never    Tobacco comments:     quit -   Substance and Sexual Activity    Alcohol use: No    Drug use: No    Sexual activity: Not Currently     Partners: Male     Birth control/protection: None       Review of Systems  Objective:     Vital Signs (Most Recent):  Temp: 98.5 °F (36.9 °C) (24 190)  Pulse: (!) 121 (24)  Resp: (!) 23 (24)  BP: 109/87 (24)  SpO2: 97 % (24) Vital Signs (24h Range):  Temp:  [97.8 °F (36.6 °C)-98.6 °F (37 °C)] 98.5 °F (36.9 °C)  Pulse:  [] 121  Resp:  [13-46] 23  SpO2:  [78 %-100 %] 97 %  BP: ()/() 109/87     Weight: 132.3 kg (291 lb 10.7 oz)  Body mass index is 45.68 kg/m².       Physical Exam  Vitals reviewed.   Constitutional:       General: She is not in acute distress.     Appearance: She is ill-appearing.   HENT:      Head: Normocephalic and atraumatic.      Right Ear: External ear normal.      Left Ear: External ear normal.      Nose: Nose normal. No congestion.      Mouth/Throat:      Mouth: Mucous membranes are moist.      Pharynx: No oropharyngeal exudate.   Eyes:      General:         Right eye: No discharge.         Left eye: No discharge.      Extraocular Movements: Extraocular movements intact.   Cardiovascular:      Rate and Rhythm: Tachycardia present.   Pulmonary:      Effort: Pulmonary effort is normal. No respiratory distress.   Abdominal:      General: There is distension.      Palpations: Abdomen is soft.      Tenderness:  There is no abdominal tenderness.   Neurological:      General: No focal deficit present.      Mental Status: She is alert and oriented to person, place, and time.   Psychiatric:         Mood and Affect: Mood normal.         Behavior: Behavior normal.      Comments: Her thought content and judgement are questionable            Review of Symptoms      Symptom Assessment (ESAS 0-10 Scale)  Pain:  0  Dyspnea:  2  Anxiety:  0  Nausea:  0  Depression:  0  Anorexia:  2  Fatigue:  5  Insomnia:  0  Restlessness:  0  Agitation:  4         Performance Status:  50    Psychosocial/Cultural:   See Palliative Psychosocial Note: No  **Primary  to Follow**  Palliative Care  Consult: No        Advance Care Planning   Advance Directives:     Decision Making:  Patient answered questions and Family answered questions  Goals of Care: The patient endorses that what is most important right now is to focus on comfort and QOL     Accordingly, we have decided that the best plan to meet the patient's goals includes enrolling in hospice care. (Today, I do not believe she has capacity for complex medical decision making). Will revisit tomorrow and assess for consistency in her goals.          Significant Labs: BMP:   Recent Labs   Lab 07/01/24  0405   *      K 4.4      CO2 20*   BUN 57*   CREATININE 5.6*   CALCIUM 8.6*   MG 2.4     CBC:   Recent Labs   Lab 06/30/24  0320 07/01/24  0405   WBC 15.29* 12.89*   HGB 10.6* 10.4*   HCT 36.8* 36.1*    196     CBC:   Recent Labs   Lab 07/01/24  0405   WBC 12.89*   HGB 10.4*   HCT 36.1*   MCV 89        BMP:  Recent Labs   Lab 07/01/24  0405   *      K 4.4      CO2 20*   BUN 57*   CREATININE 5.6*   CALCIUM 8.6*   MG 2.4     LFT:  Lab Results   Component Value Date    AST 17 06/29/2024    ALKPHOS 96 06/29/2024    BILITOT 1.1 (H) 06/29/2024     Albumin:   Albumin   Date Value Ref Range Status   06/29/2024 3.1 (L) 3.5 - 5.2 g/dL  Final     Protein:   Total Protein   Date Value Ref Range Status   06/29/2024 6.1 6.0 - 8.4 g/dL Final     Lactic acid:   Lab Results   Component Value Date    LACTATE 0.9 06/29/2024    LACTATE 1.1 06/27/2024       Significant Imaging: I have reviewed all pertinent imaging results/findings within the past 24 hours.

## 2024-07-02 NOTE — PROGRESS NOTES
[facility] Medicine  Progress Note    Patient Name: Juhi Taylor  MRN: [id number]  Patient Class: IP- Inpatient   Admission Date: 6/18/2024  Length of Stay: 13 days  Attending Physician: Caleb Quintero MD  Primary Care Provider: Tony Sadler MD        Subjective:     Principal Problem:Chronic kidney disease (CKD), stage V        HPI:  73 year old pt getting admitted with fluid overload in need of dialysis  Pt had PD catheter insertion 2 weeks ago  She was supposed to start on PD for ESRD but got delayed due to insurance issues  She has oliguria/increased weight gain in past 2 weeks  Also SOB on exertion and cough  Symptoms got worse and she came to ER and got admitted  Pt was started on lasix gtt iv         Overview/Hospital Course:  Patient is a 73-year-old female who was admitted for acute hypoxic respiratory failure likely multifactorial from CKD and CHF.  Patient had PD cath placed 2 weeks ago however due to insurance situation never received dialysis. EF 40 -45% with elevated BNP. Patient was placed on Furosemide and metolazone with poor urine output eventually leading to dialysis.Nephrology is following. Discontinued antibiotics since infection was ruled out.   Patient got PEA cardiac arrest during the dialysis and then resuscitated and got ROSC quickly and admitted to ICU and intubated.  Later initiated hemodialysis again and later was able to extubate.  Patient had significant elevation of troponin cardiology consulted.  No acute intervention recommended because of clinical condition and later patient is not keen to do the angiogram was stress test.  Patient was on heparin but needed to hold for some days because of oozing from the left dialysis catheter access and DDAVP 1 dose given.  Patient also developed thrombocytopenia most likely from dialysis but later platelet count improved and heparin restarted.  Echocardiogram was done and showed about 25%  ejection fraction and Cardiology reviewed and recommended ICD.  Patient also developed AFib with RVR and needed amiodarone drip briefly but patient history of allergy and did not continue. Patient is getting dialysis and over patient is feeling better and downgraded.  Patient is going to need rehab placement.     Patient noted to have uptrended leukocytosis. Left trailysis cathter had a bruising around and possible pus collection and this line is removed. Blood culture and tip culture so far is no growth. Patient is currently on cefepime and Vancomycin. Patient also continue to be on Afib with RVR, has refused multiple medications. Patient was started on cardizem drip by cardiology. Patient and family going back and forth about transferring to [facility]. Initially they refused to put another dialysis catheter here, but now has agreed. They have also met with palliative care. Not agreeable for hospice.     Interval History: Patient is sitting up in the chair. She is frustrated. States she wants to continue dialysis. Also says she wants to go home. States no one tells her what's going on. Also imply that she doesn't want team to be talking to her family and not her. They have requested to be transferred to [facility] for dialysis catheter placement and angiogram.     Review of Systems  Objective:     Vital Signs (Most Recent):  Temp: 98.6 °F (37 °C) (07/02/24 1130)  Pulse: 85 (07/02/24 1400)  Resp: (!) 21 (07/02/24 1400)  BP: (!) 131/97 (07/02/24 1142)  SpO2: 96 % (07/02/24 1400) Vital Signs (24h Range):  Temp:  [97 °F (36.1 °C)-98.6 °F (37 °C)] 98.6 °F (37 °C)  Pulse:  [] 85  Resp:  [18-34] 21  SpO2:  [78 %-100 %] 96 %  BP: ()/() 131/97     Weight: 132.3 kg (291 lb 10.7 oz)  Body mass index is 45.68 kg/m².    Intake/Output Summary (Last 24 hours) at 7/2/2024 1437  Last data filed at 7/2/2024 1300  Gross per 24 hour   Intake 516.74 ml   Output --   Net 516.74 ml         Physical Exam  Vitals and nursing note  reviewed.   HENT:      Head: Normocephalic and atraumatic.   Eyes:      Conjunctiva/sclera: Conjunctivae normal.   Neck:      Vascular: No JVD. There is bruising over the previous trialysis catheter, which is now removed.   Cardiovascular:      Heart sounds: Normal heart sounds.   Pulmonary:      Effort: Pulmonary effort is normal.      Comments: Decreased   Abdominal:      Palpations: Abdomen is soft. There if PD catheter.   Musculoskeletal:         General: Normal range of motion.   Skin:     General: Skin is warm.   Neurological:      Mental Status: She is alert and oriented to person, place, and time.   Psychiatric:         Mood and Affect: Mood normal.        Significant Labs: All pertinent labs within the past 24 hours have been reviewed.  CBC:   Recent Labs   Lab 07/01/24  0405 07/02/24  0326   WBC 12.89* 10.98   HGB 10.4* 11.3*   HCT 36.1* 39.7    151     CMP:   Recent Labs   Lab 07/01/24  0405 07/02/24  0326    136   K 4.4 4.7    105   CO2 20* 18*   * 108   BUN 57* 72*   CREATININE 5.6* 6.2*   CALCIUM 8.6* 8.7   ANIONGAP 12 13       Significant Imaging: I have reviewed all pertinent imaging results/findings within the past 24 hours.    Assessment/Plan:      * Chronic kidney disease (CKD), stage V  Patient had PD cath placed 2 weeks ago however due to insurance situation never received dialysis  Tunneled dialysis catheter was placed 6/22  Revision 6/23  Patient has persistent oozing from dialysis catheter  and DDAVP given and now stopped   HD as per nephrology   Tunnel catheter needed to removed on 6/29 because of possible infection . Cultures neg  - follow plans from nephrology for further dialysis, patient has agreed for another line placement per nephrology. Vascular has been consulted.      CHF (congestive heart failure)  Acute worsening HFrEF  EF 25% with elevated BNP  Will need lifevest and AICD as OP   S/p tunnel line and HD from there . Needed to remove because of possible  line infection . Line culture sent   Amiodarone was on hold for bradycardia and possible allergy   Now on Cardizem per cardiology     Cardiac arrest  Rhythm PEA. Initially bradycardiac  now A fib with RVR  Possible cardiac cause . Cardiology on the case   Cannot give blood thinners initially. Oozing from tunnel catheter  and thrombocytopenia  Severe HF with EF 20% . Need life vest at DC. AICD as OP   Cardiology follow up       Paroxysmal atrial fibrillation  Afib with rapid ventricular response   - Cardiology following   - On Cardizem drip  - has refused eliquis       Coronary artery disease  Stable  Continue with medical management  + troponin . Unsure arrest secondary to cardiac cause . H/o a fib noted  Patient received heparin, now off anticoagulation due to thrombocytopenia   Amiodarone patient has allergy  On Cardizem drip per cardiology (although her EF is only 20%)  Patient has been going back and forth for angiogram. Now stating they want to be transferred.     Goals of care, counseling/discussion  Palliative care has been consulted. Patient and family does not want hospice.       Severe sepsis  Likely infection from tunneled cath site   Blood culture negative   Tip culture negative so far   - On cefepime and Vancomycin  - if culture remain negative, de-escalate antibiotics     Morbid obesity  Body mass index is 45.68 kg/m². Morbid obesity complicates all aspects of disease management from diagnostic modalities to treatment.     Acute hypoxic respiratory failure  Patient has severe fluid overload secondary to ESRD, cardiac arrest in HD hemodialysis  Likely multifactorial from CHF with worsening CKD  EF 40-45%  in previous ECHO but repeat with 25% with elevated BNP  Status post tunnel line 6/22 and removed 6/29 because of superficial infection   Intubated with cardiac arrest 6/24 and extubated 6/27  HD as per nephrology   Wean oxygen         Renal cell carcinoma of right kidney  Aware       Type 2 diabetes  mellitus with hyperglycemia  Patient's FSGs are controlled on current medication regimen.  Last A1c reviewed-   Lab Results   Component Value Date    HGBA1C 6.8 (H) 06/19/2024       Hold Oral hypoglycemics while patient is in the hospital.    Essential hypertension  Chronic, controlled. Latest blood pressure and vitals reviewed-     Home meds for hypertension were reviewed and noted below.   Hypertension Medications               carvediloL (COREG) 25 MG tablet Take 1 tablet (25 mg total) by mouth 2 (two) times daily.    cloNIDine (CATAPRES) 0.1 MG tablet Take 1 tablet (0.1 mg total) by mouth 3 (three) times daily as needed (PRN SBP > 165 mmHg).    cloNIDine 0.1 mg/24 hr td ptwk (CATAPRES) 0.1 mg/24 hr Place 1 patch onto the skin every 7 days.    furosemide (LASIX) 40 MG tablet Take 40 mg by mouth once daily.    amLODIPine (NORVASC) 10 MG tablet Take 10 mg by mouth once daily.    nitroGLYCERIN (NITROSTAT) 0.4 MG SL tablet Place 1 tablet (0.4 mg total) under the tongue every 5 (five) minutes as needed for Chest pain.          Currently on IV Lasix and  Cardizem drip         VTE Risk Mitigation (From admission, onward)           Ordered     heparin (porcine) injection 5,000 Units  Every 8 hours         06/28/24 1806     IP VTE HIGH RISK PATIENT  Once         06/18/24 1519     Place sequential compression device  Until discontinued         06/18/24 1519                    Discharge Planning   HARINI: 7/3/2024     Code Status: Full Code   Is the patient medically ready for discharge?:     Reason for patient still in hospital (select all that apply): Treatment  Discharge Plan A: Kelli Quintero MD  Department of Hospital Medicine   [facility]

## 2024-07-02 NOTE — PLAN OF CARE
Problem: SLP  Goal: SLP Goal  Description: 1) Pt will consume Regular (IDDSI 7) textures and thin liquids (IDDSI 0) with functional oral phase and no overt s/s penetration/aspiration.    Outcome: Modified   Pt not wearing denture/partials (present in room) but consuming IDDSI 6 well.  Doesn't wear dentures most of the time it seems from pt and daughters' description.  Diet texture deemed appropriate for current status, would not upgrade at this time.

## 2024-07-02 NOTE — NURSING
Nurses Note -- 4 Eyes      7/1/2024   7:16 PM      Skin assessed during: Q Shift Change      [] No Altered Skin Integrity Present    []Prevention Measures Documented      [x] Yes- Altered Skin Integrity Present or Discovered   [] LDA Added if Not in Epic (Describe Wound)   [] New Altered Skin Integrity was Present on Admit and Documented in LDA   [] Wound Image Taken    Wound Care Consulted? Yes    Attending Nurse:  Sheila Strickland RN/Staff Member:  Joan

## 2024-07-02 NOTE — ASSESSMENT & PLAN NOTE
Stable  Continue with medical management  + troponin . Unsure arrest secondary to cardiac cause . H/o a fib noted  Patient received heparin, now off anticoagulation due to thrombocytopenia   Amiodarone patient has allergy  On Cardizem drip per cardiology (although her EF is only 20%)  Patient has been going back and forth for angiogram. Now stating they want to be transferred.

## 2024-07-02 NOTE — SUBJECTIVE & OBJECTIVE
"Interval History: No major changes    Past Medical History:   Diagnosis Date    Anticoagulant long-term use     Arthritis     Breast cancer 2014    invasive lobular carcinoma    Cancer of kidney 11/2020    RIGHT KIDNEY CANCER    CHF (congestive heart failure)     Coronary artery disease dx 2005    Depression     Diabetes mellitus     Diastolic heart failure secondary to hypertension     Gout     Hyperlipemia     Hypertension     Hypertrophy of nasal turbinates     Kidney mass 2020    Right    Levoscoliosis     Lung nodule     left    Multiple thyroid nodules     NICOLE (obstructive sleep apnea)     uses C-PAP    Pulmonary hypertension        Past Surgical History:   Procedure Laterality Date    AORTOGRAPHY N/A 12/04/2020    Procedure: Aortogram;  Surgeon: Paul Pedersen MD;  Location: [facility] CATH;  Service: Cardiology;  Laterality: N/A;    BREAST SURGERY      CARDIAC CATHETERIZATION  12/2020    CHOLECYSTECTOMY      COLONOSCOPY      multi -last 2014     CORONARY ARTERY BYPASS GRAFT      ESOPHAGOGASTRODUODENOSCOPY      2012     HAND SURGERY Right     INSERTION OF CATHETER N/A 6/23/2024    Procedure: Insertion,catheter;  Surgeon: Meli Griffin MD;  Location: [facility] OR;  Service: Vascular;  Laterality: N/A;  ORIGINAL CATHETER WAS REPOSITIONED.  NO NEW CATHETER WAS PLACED    INSERTION, CATHETER, DIALYSIS, PERITONEAL N/A 6/4/2024    Procedure: IN Insertion Catheter, Dialysis, Peritoneal - laparoscopic;  Surgeon: Rodriguez Catalan Jr., MD;  Location: [facility] OR;  Service: General;  Laterality: N/A;    LAPAROSCOPIC ROBOT-ASSISTED SURGICAL REMOVAL OF KIDNEY USING DA CHELLE XI Right 03/10/2022    Procedure: XI ROBOTIC NEPHRECTOMY- radical;  Surgeon: Rolando Ramirez MD;  Location: [facility] OR;  Service: Urology;  Laterality: Right;    MASTECTOMY W/ SENTINEL NODE BIOPSY Bilateral 01/21/2015    bilateral "dog ears"    NASAL SINUS SURGERY  2015    Dr Bryant FESS/cauterization turbinate     PARTIAL HYSTERECTOMY  1989    " PERCUTANEOUS TRANSLUMINAL BALLOON ANGIOPLASTY OF CORONARY ARTERY  12/04/2020    Procedure: Angioplasty-coronary;  Surgeon: Paul Pedersen MD;  Location: [facility] CATH;  Service: Cardiology;;    RENAL BIOPSY Right     9/20/2021 EJ    TUBAL LIGATION      ULTRASOUND GUIDANCE  12/04/2020    Procedure: Ultrasound Guidance;  Surgeon: Paul Pedersen MD;  Location: [facility] CATH;  Service: Cardiology;;       Review of patient's allergies indicates:   Allergen Reactions    Percocet [oxycodone-acetaminophen] Itching    Amiodarone analogues      Itching      Irbesartan Swelling    Januvia [sitagliptin]     Jardiance [empagliflozin]      Leg cramps    Lipitor [atorvastatin] Other (See Comments)     Severe leg pain    Linaclotide Other (See Comments) and Nausea And Vomiting     Does not remember    Lubiprostone Other (See Comments) and Palpitations     Does not remember       Medications:  Continuous Infusions:   dilTIAZem  5 mg/hr Intravenous Continuous 5 mL/hr at 07/02/24 1338 5 mg/hr at 07/02/24 1338     Scheduled Meds:   ceFEPime IV (PEDS and ADULTS)  2 g Intravenous Q24H    epoetin alaina-epbx  50 Units/kg Intravenous Every Mon, Wed, Fri    famotidine  20 mg Oral Daily    furosemide (LASIX) injection  40 mg Intravenous Q12H    guaiFENesin 100 mg/5 ml  400 mg Oral BID    heparin (porcine)  5,000 Units Subcutaneous Q8H    metoprolol tartrate  25 mg Oral BID     PRN Meds:  Current Facility-Administered Medications:     acetaminophen, 650 mg, Oral, Q8H PRN    aluminum-magnesium hydroxide-simethicone, 30 mL, Oral, QID PRN    dextrose 50%, 12.5 g, Intravenous, PRN    dextrose 50%, 25 g, Intravenous, PRN    glucagon (human recombinant), 1 mg, Intramuscular, PRN    glucose, 24 g, Oral, PRN    melatonin, 6 mg, Oral, Nightly PRN    ondansetron, 4 mg, Intravenous, Q6H PRN    Pharmacy to dose Vancomycin consult, , , Once **AND** vancomycin - pharmacy to dose, , Intravenous, pharmacy to manage frequency    Family History       Problem Relation  (Age of Onset)    Asthma Daughter    Birth defects Daughter    Breast cancer Mother, Maternal Aunt    Depression Daughter    Drug abuse Daughter, Daughter    Glaucoma Sister    Hepatitis Brother    Hypertension Father    Learning disabilities Daughter    Mental illness Daughter    Stroke Father          Tobacco Use    Smoking status: Former     Current packs/day: 0.00     Types: Cigarettes     Start date: 2016     Quit date: 2016     Years since quittin.5    Smokeless tobacco: Never    Tobacco comments:     quit -   Substance and Sexual Activity    Alcohol use: No    Drug use: No    Sexual activity: Not Currently     Partners: Male     Birth control/protection: None       Review of Systems  Objective:     Vital Signs (Most Recent):  Temp: 98.6 °F (37 °C) (24 1130)  Pulse: 87 (24 1505)  Resp: (!) 23 (24 1505)  BP: (!) 131/97 (24 1142)  SpO2: 98 % (24 1505) Vital Signs (24h Range):  Temp:  [97 °F (36.1 °C)-98.6 °F (37 °C)] 98.6 °F (37 °C)  Pulse:  [] 87  Resp:  [18-33] 23  SpO2:  [78 %-100 %] 98 %  BP: ()/() 131/97     Weight: 132.3 kg (291 lb 10.7 oz)  Body mass index is 45.68 kg/m².       Physical Exam  Vitals reviewed.   Constitutional:       General: She is not in acute distress.     Appearance: She is ill-appearing.   HENT:      Head: Normocephalic and atraumatic.      Right Ear: External ear normal.      Left Ear: External ear normal.      Nose: Nose normal. No congestion.      Mouth/Throat:      Mouth: Mucous membranes are moist.      Pharynx: No oropharyngeal exudate.   Eyes:      General:         Right eye: No discharge.         Left eye: No discharge.      Extraocular Movements: Extraocular movements intact.   Cardiovascular:      Rate and Rhythm: Tachycardia present.   Pulmonary:      Effort: Pulmonary effort is normal. No respiratory distress.   Abdominal:      General: There is distension.      Palpations: Abdomen is soft.       Tenderness: There is no abdominal tenderness.   Neurological:      General: No focal deficit present.      Mental Status: She is alert.   Psychiatric:         Mood and Affect: Mood normal.         Behavior: Behavior normal.      Comments: Her thought content and judgement remain questionable            Review of Symptoms      Symptom Assessment (ESAS 0-10 Scale)  Pain:  0  Dyspnea:  2  Anxiety:  0  Nausea:  0  Depression:  0  Anorexia:  2  Fatigue:  5  Insomnia:  0  Restlessness:  0  Agitation:  4         Performance Status:  50    Psychosocial/Cultural:   See Palliative Psychosocial Note: No  **Primary  to Follow**  Palliative Care  Consult: No        Advance Care Planning   Advance Directives:     Decision Making:  Patient answered questions and Family answered questions  Goals of Care: The patient and family endorses that what is most important right now is to focus on extending life as long as possible, even it it means sacrificing quality    Accordingly, we have decided that the best plan to meet the patient's goals includes continuing with treatment           Significant Labs: BMP:   Recent Labs   Lab 07/02/24  0326         K 4.7      CO2 18*   BUN 72*   CREATININE 6.2*   CALCIUM 8.7   MG 2.4     CBC:   Recent Labs   Lab 07/01/24  0405 07/02/24  0326   WBC 12.89* 10.98   HGB 10.4* 11.3*   HCT 36.1* 39.7    151     CBC:   Recent Labs   Lab 07/02/24  0326   WBC 10.98   HGB 11.3*   HCT 39.7   MCV 89        BMP:  Recent Labs   Lab 07/02/24  0326         K 4.7      CO2 18*   BUN 72*   CREATININE 6.2*   CALCIUM 8.7   MG 2.4     LFT:  Lab Results   Component Value Date    AST 17 06/29/2024    ALKPHOS 96 06/29/2024    BILITOT 1.1 (H) 06/29/2024     Albumin:   Albumin   Date Value Ref Range Status   06/29/2024 3.1 (L) 3.5 - 5.2 g/dL Final     Protein:   Total Protein   Date Value Ref Range Status   06/29/2024 6.1 6.0 - 8.4 g/dL Final      Lactic acid:   Lab Results   Component Value Date    LACTATE 0.9 06/29/2024    LACTATE 1.1 06/27/2024       Significant Imaging: I have reviewed all pertinent imaging results/findings within the past 24 hours.

## 2024-07-02 NOTE — NURSING
Call received from pt's daughter Maria Luisa López. She was very upset, saying that her mom is being treated with cruel and unusual punishment because she is unable to get in the shower. I explained that the pt is not strong or stable enough to sit in shower chair, along with her complicated hx. She has received baths, the last one being charted on 6/30/24. Pt. Has refused baths since. Pt. Does not even want so much as vital signs to be taken. Pt's daughter she wants to talk to the pt advocate. Pt. Advocate has been by the room to see the pt this morning. Maria Luisa said she will talk to someone in authority when she arrives this morning.

## 2024-07-02 NOTE — NURSING
Attempted to have pt get back in bed, in order to be able to change pts brief. Pt still very argumentative, not wanting help to the bed from the chair. Extensively explained the importance of having support while getting up. Pt not willing to let staff help. Pt decided to stay in chair and refuses to be changed.     Pt stated the only thing she wanted to do was be able to go home. She no longer wants to be in the hospital. Her final wish is to be comfortable at home.     Spoke with kash, updated on progress of night.

## 2024-07-02 NOTE — HPI
"Per admit H&P: "73 year old pt getting admitted with fluid overload in need of dialysis  Pt had PD catheter insertion 2 weeks ago  She was supposed to start on PD for ESRD but got delayed due to insurance issues  She has oliguria/increased weight gain in past 2 weeks  Also SOB on exertion and cough  Symptoms got worse and she came to ER and got admitted  Pt was started on lasix gtt iv"    Since admission she was undergone a very complicated hospitalization related to septic shock from infected catheter, she was been started on hemodialysis, she was been treated for heart failure exacerbation, and she has experienced cardiac arrest requiring ACLS protocol with intubation.  Fortunately, she has been extubated.  Nonetheless, she remains very ill with a guarded prognosis.  I have been asked to assist goals of care.  "

## 2024-07-02 NOTE — ASSESSMENT & PLAN NOTE
I reviewed the patient's chart and discussed the case with the patient's team.      I examined Juhi Taylor at bedside.  The patient lacks capacity for complex medical decision-making.  I spoke with her and her daughters at bedside.    They are well up to date on her condition. They discussed her multiple medical issues including kidney failure, heart failure, cardiac arrest and various complications throughout this hospitalization.     We discussed her prognosis which is guarded. They understand that she is critically ill and at a high risk of death during this hospitalization. In the best case scenario she is likely to have many weeks or months of recovery.    At this point she and her family are willing to pursue any available measure to prolong her life. Even if that means prolonged hospitalization, further surgical procedures, long term dialysis, etc. She is fighting to get back home to her family. She finds strength in her Zoroastrian henrry.     At this point family is very anxious to transfer to [facility] for cardiac intervention. They are hesitant to allow further intervention here at [facility] due to her multiple complications. I fully explained that transfer could take many days. Without dialysis access she will not have dialysis  and therefore would be at a high risk of death. If her goals are to live by any means necessary I encourage her/them to allow dialysis access and continued supportive measures while she is awaiting transfer. If not, we need to discuss hospice. They understood and are agreeable to dialysis catheter placement. They do not desire hospice.    Primary team is aware of the above. Surgery has been consulted for catheter placement.     She and her family respectfully asked that I step away from her care. I will honor their request. They have my card with my direct contact information and know to call if I can be of any help.     I appreciate being involved. I hope I have been helpful.      I will sign off at family request. If I can be helpful in anyway, please do not hesitate to call. Happy to revisit anytime.

## 2024-07-02 NOTE — PLAN OF CARE
Outside Transfer Acceptance Note / Regional Referral Center    Referring facility: [facility]   Referring provider: L[address] [place] MAHARABE  Accepting facility: [facility]  Accepting provider: Louise Maurice MD  Admitting provider: Louise Maurice MD  Reason for transfer:  EP and patient request  Transfer diagnosis: Afib with RVR  Transfer specialty requested: Cardiology  Electrophysiology  Transfer specialty notified: Yes  Transfer level: NUMBER 1-5: 2  Bed type requested: Step down  Isolation status: No active isolations   Admission class or status: IP- Inpatient  IP- Inpatient      Narrative     Transfer Diagnosis:  Afib with RVR  Reason for Transfer:  EP and patient request  Consultants:  EP, Cards, Nephro, IR  Transferring Facility:  [facility]  [place] [facility]    Ms. Juhi Taylor is a 74 y.o. female with afib on Eliquis, CAD, ESRD on PD, CHF EF40%, and morbid obesity who initailly presented to the [facility] ED on 6/18 for evaluation of shortness of breath.  She had a PD catheter inserted at the beginning of June to start PD, but because of insurance issues, her PD got delayed.  Since that time, she developed worsening SOB and weight gain.  Because her symptoms continued to worsen, she went to the ER.    Upon arrival to the ER, she was found to be satting 89% on room air.  Labs showed WBC 10, Hgb 12.5, Creatinine 4.1 and BNP 1,726.  She was started on a Lasix gtt and Nephro was consulted.  She failed to have adequate urine output despite Lasix gtt and Metolazone, so she was started on HD.  On 6/24, she got PEA cardiac arrest during dialysis.  After giving Epinephrine, the rhythm appeared to change to ventricular tachycardia.  She was cardioverted and achieved ROSC.  She was intubated and transferred to ICU on an Amiodarone gtt, which was stopped because of patient intolerance.  She was found to have a significant Troponin  and Cardiology was consulted.  Echo showed a drop in EF to 20-25% from 40% prior to admission.  Cardiology decided against acute intervention because of her clinical condition, but suggested an ICD and angiogram once improved.  HD was continued and and she was able to be extubated and SD to the floor.  She was noted to have an uptrending leukocytosis. Left trailysis cathter had bruising around and possible purulent collection and the line was removed. Blood culture and tip culture have been NGTD.  She is currently on Cefepime and Vancomycin.   She then developed AFib with RVR.  She was started on Sotalol by Cardiology, which patient then refused to take once reading the ADRs.  She was then started on a Dilt gtt (with her reduced EF) and Metoprolol.  She has been seen by Palliative Care, and they are not ready for hospice.  Patient and family are not happy with the care at [facility].  They [place] [facility] [place].  She will [place] [facility] [place] EP, Cardiology, Nephrology and IR evaluations.    Consult EP for afib with RVR/ICD eval  Consult Cardiology for angiogram  Consult Nephro for HD  Consult IR/Interventional Nephro for HD line placement      Objective     Vitals: Temp: 98.6 °F (37 °C) (07/02/24 1130)  Pulse: 87 (07/02/24 1505)  Resp: (!) 23 (07/02/24 1505)  BP: (!) 131/97 (07/02/24 1142)  SpO2: 98 % (07/02/24 1505)  Recent Labs: All pertinent labs within the past 24 hours have been reviewed.  CBC:   Recent Labs   Lab 07/01/24  0405 07/02/24  0326   WBC 12.89* 10.98   HGB 10.4* 11.3*   HCT 36.1* 39.7    151     CMP:   Recent Labs   Lab 07/01/24  0405 07/02/24  0326    136   K 4.4 4.7    105   CO2 20* 18*   * 108   BUN 57* 72*   CREATININE 5.6* 6.2*   CALCIUM 8.6* 8.7   ANIONGAP 12 13        IV access:        Peripheral IV - Single Lumen 07/01/24 1800 18 G 3/4 in No Right Antecubital (Active)   Site Assessment Clean;Dry;Intact 07/02/24 1471   Extremity Assessment Distal to IV  No abnormal discoloration;No redness;No swelling 07/02/24 1301   Line Status Infusing 07/02/24 0501   Dressing Status Clean;Dry;Intact 07/02/24 1301   Dressing Intervention Integrity maintained 07/02/24 1301   Dressing Change Due 07/05/24 07/02/24 0501   Site Change Due 07/05/24 07/01/24 2101   Reason Not Rotated Not due 07/01/24 2101       Allergies:   Review of patient's allergies indicates:   Allergen Reactions    Percocet [oxycodone-acetaminophen] Itching    Amiodarone analogues      Itching      Irbesartan Swelling    Januvia [sitagliptin]     Jardiance [empagliflozin]      Leg cramps    Lipitor [atorvastatin] Other (See Comments)     Severe leg pain    Linaclotide Other (See Comments) and Nausea And Vomiting     Does not remember    Lubiprostone Other (See Comments) and Palpitations     Does not remember      NPO: Yes    Anticoagulation:   Anticoagulants       Ordered     Route Frequency Start Stop    06/28/24 1806  heparin (porcine)         SubQ Every 8 hours 06/28/24 1815 --             Instructions      Adalberto Amato-  Admit to Hospital Medicine  Upon patient arrival to floor, please send SecureChat to [facility] or call extension [phone] (if no answer, do NOT leave a callback number after the beep, rather please send a SecureChat to [facility]), for Hospital Medicine admit team assignment and for additional admit orders for the patient.  Do not page the attending physician associated with the patient on arrival (this physician may not be on duty at the time of arrival).  Rather, always send a SecureChat to [facility] or call [phone] to reach the triage physician for orders and team assignment.    Louise Maurice MD, [facility]  Senior Physician  [facility]

## 2024-07-02 NOTE — ASSESSMENT & PLAN NOTE
Afib with rapid ventricular response   - Cardiology following   - On Cardizem drip  - has refused eliquis

## 2024-07-02 NOTE — PLAN OF CARE
Problem: Infection  Goal: Absence of Infection Signs and Symptoms  Outcome: Progressing     Problem: Adult Inpatient Plan of Care  Goal: Plan of Care Review  Outcome: Progressing  Goal: Patient-Specific Goal (Individualized)  Outcome: Progressing  Goal: Absence of Hospital-Acquired Illness or Injury  Outcome: Progressing  Goal: Optimal Comfort and Wellbeing  Outcome: Progressing  Goal: Readiness for Transition of Care  Outcome: Progressing     Problem: Bariatric Environmental Safety  Goal: Safety Maintained with Care  Outcome: Progressing     Problem: Fall Injury Risk  Goal: Absence of Fall and Fall-Related Injury  Outcome: Progressing     Problem: Skin Injury Risk Increased  Goal: Skin Health and Integrity  Outcome: Progressing     Problem: Diabetes Comorbidity  Goal: Blood Glucose Level Within Targeted Range  Outcome: Progressing     Problem: Wound  Goal: Optimal Coping  Outcome: Progressing  Goal: Optimal Functional Ability  Outcome: Progressing  Goal: Absence of Infection Signs and Symptoms  Outcome: Progressing  Goal: Improved Oral Intake  Outcome: Progressing  Goal: Optimal Pain Control and Function  Outcome: Progressing  Goal: Skin Health and Integrity  Outcome: Progressing  Goal: Optimal Wound Healing  Outcome: Progressing     Problem: Hemodialysis  Goal: Safe, Effective Therapy Delivery  Outcome: Progressing  Goal: Effective Tissue Perfusion  Outcome: Progressing  Goal: Absence of Infection Signs and Symptoms  Outcome: Progressing     Problem: Restraint, Nonviolent  Goal: Absence of Harm or Injury  Outcome: Progressing     Problem: Coping Ineffective  Goal: Effective Coping  Outcome: Progressing     Problem: Sepsis/Septic Shock  Goal: Optimal Coping  Outcome: Progressing  Goal: Absence of Bleeding  Outcome: Progressing  Goal: Blood Glucose Level Within Targeted Range  Outcome: Progressing  Goal: Absence of Infection Signs and Symptoms  Outcome: Progressing  Goal: Optimal Nutrition Intake  Outcome:  Progressing

## 2024-07-02 NOTE — PLAN OF CARE
Patient remains in need of acute care setting. Final disposition is unclear at this time. Needs to be determined by clinical course. Will continue to follow and update as appropriate.

## 2024-07-02 NOTE — NURSING
Notified Dr. Catalan of consult for a trialysis. Since pt has eaten most recently at 1300 today, he will do the procedure in the morning. Pt. Is to be NPO after midnight.

## 2024-07-02 NOTE — PT/OT/SLP PROGRESS
Physical Therapy Treatment    Patient Name:  Juhi Taylor   MRN:  [id number]    Recommendations:     Discharge Recommendations: High Intensity Therapy  Discharge Equipment Recommendations: to be determined by next level of care  Barriers to discharge:  high fall risk, increased assist with mobility, medical status     Assessment:     Juhi Taylor is a 74 y.o. female admitted with a medical diagnosis of Chronic kidney disease (CKD), stage V.  She presents with the following impairments/functional limitations: weakness, impaired endurance, impaired self care skills, impaired functional mobility, gait instability, impaired balance, decreased safety awareness, impaired cardiopulmonary response to activity.    Pt was found up in chair on 4L O2 with family present and agreeable to therapy. Pt states that she has been up in the chair since last night and would like to return to the bed. Pt required MaxA x 2 for chair to bed transfer with RW and step transfer. Once seated EOB, pt required ModA x 2 for sit to supine needing help lifting the legs and guiding the shoulders. Pt was left HOB elevated with all needs met and family present.     Rehab Prognosis: Fair; patient would benefit from acute skilled PT services to address these deficits and reach maximum level of function.    Recent Surgery: Procedure(s) (LRB):  Insertion,catheter (N/A) 9 Days Post-Op    Plan:     During this hospitalization, patient to be seen 5 x/week to address the identified rehab impairments via gait training, therapeutic activities, therapeutic exercises, neuromuscular re-education and progress toward the following goals:    Plan of Care Expires:  07/28/24    Subjective     Chief Complaint: Fatigue   Patient/Family Comments/goals: Get better  Pain/Comfort:  Pain Rating 1: 0/10      Objective:     Communicated with RN prior to session.  Patient found up in chair with telemetry, pulse ox (continuous), peripheral IV, oxygen, blood pressure  cuff upon PT entry to room.     General Precautions: Standard, fall  Orthopedic Precautions: N/A  Braces: N/A  Respiratory Status: Nasal cannula, flow 4 L/min     Functional Mobility:    Bed mobility:  Sit to supine: ModA x 2   Transfers:     Chair to bed: maximal assistance and of 2 persons with  rolling walker  using  Step Transfer      AM-PAC 6 CLICK MOBILITY  Turning over in bed (including adjusting bedclothes, sheets and blankets)?: 3  Sitting down on and standing up from a chair with arms (e.g., wheelchair, bedside commode, etc.): 2  Moving from lying on back to sitting on the side of the bed?: 3  Moving to and from a bed to a chair (including a wheelchair)?: 2  Need to walk in hospital room?: 2  Climbing 3-5 steps with a railing?: 1  Basic Mobility Total Score: 13       Treatment & Education:  Pt was educated on the importance of time OOB, POC, safe transfers and ambulation, and use of call button for further assistance.     Patient left HOB elevated with all lines intact, call button in reach, bed alarm on, and family present..    GOALS:   Multidisciplinary Problems       Physical Therapy Goals          Problem: Physical Therapy    Goal Priority Disciplines Outcome Goal Variances Interventions   Physical Therapy Goal     PT, PT/OT Progressing     Description: Goals to be met by: 24     Patient will increase functional independence with mobility by performin. Supine to sit with Supervision  2. Sit to stand transfer with Supervision  3. Bed to chair transfer with Supervision using Rolling Walker  4. Gait  x 150 feet with Supervision using Rolling Walker.                              Time Tracking:     PT Received On: 24  PT Start Time: 1109     PT Stop Time: 1122  PT Total Time (min): 13 min     Billable Minutes: Therapeutic Activity 13    Treatment Type: Treatment  PT/PTA: PT     Number of PTA visits since last PT visit: 0     2024

## 2024-07-02 NOTE — PLAN OF CARE
Problem: Infection  Goal: Absence of Infection Signs and Symptoms  Outcome: Progressing     Problem: Adult Inpatient Plan of Care  Goal: Plan of Care Review  Outcome: Progressing  Goal: Patient-Specific Goal (Individualized)  Outcome: Progressing  Goal: Absence of Hospital-Acquired Illness or Injury  Outcome: Progressing  Goal: Optimal Comfort and Wellbeing  Outcome: Progressing  Goal: Readiness for Transition of Care  Outcome: Progressing     Problem: Bariatric Environmental Safety  Goal: Safety Maintained with Care  Outcome: Progressing     Problem: Fall Injury Risk  Goal: Absence of Fall and Fall-Related Injury  Outcome: Progressing     Problem: Skin Injury Risk Increased  Goal: Skin Health and Integrity  Outcome: Progressing     Problem: Diabetes Comorbidity  Goal: Blood Glucose Level Within Targeted Range  Outcome: Progressing     Problem: Wound  Goal: Optimal Coping  Outcome: Progressing  Goal: Optimal Functional Ability  Outcome: Progressing  Goal: Absence of Infection Signs and Symptoms  Outcome: Progressing  Goal: Improved Oral Intake  Outcome: Progressing  Goal: Optimal Pain Control and Function  Outcome: Progressing  Goal: Skin Health and Integrity  Outcome: Progressing  Goal: Optimal Wound Healing  Outcome: Progressing     Problem: Hemodialysis  Goal: Safe, Effective Therapy Delivery  Outcome: Progressing  Goal: Effective Tissue Perfusion  Outcome: Progressing  Goal: Absence of Infection Signs and Symptoms  Outcome: Progressing     Problem: Restraint, Nonviolent  Goal: Absence of Harm or Injury  Outcome: Progressing     Problem: Coping Ineffective  Goal: Effective Coping  Outcome: Progressing     Problem: Sepsis/Septic Shock  Goal: Optimal Coping  Outcome: Progressing  Goal: Absence of Bleeding  Outcome: Progressing  Goal: Blood Glucose Level Within Targeted Range  Outcome: Progressing  Goal: Absence of Infection Signs and Symptoms  Outcome: Progressing  Goal: Optimal Nutrition Intake  Outcome:  Progressing   Pt. And family agreeable to tunneled cath placement tomorrow. She agreed to a bath this evening. Awaiting US to the upper extremity veins. VAN STRICKLAND. PHUONG.

## 2024-07-02 NOTE — ASSESSMENT & PLAN NOTE
I reviewed the patient's chart and discussed the case with the patient's team.      I examined Juhi Taylor at bedside.  The patient has questionable capacity for complex medical decision-making.  I spoke with her at bedside.    I introduced myself and my role as palliative care physician. They were agreeable to speaking.    We discussed the patient's medical illness, prognosis, and values in detail.  Below is a brief summary of our discussion.    The patient was not able to accurately detail her hospitalization.  We did discuss her multitude of issues and her very complicated hospitalization related to kidney failure, heart failure, lung failure, severe infection, and overall critically ill state.  She was able to teach this back to me.    We discussed her prognosis.  I explained that she was a guarded prognosis.  In the best case scenario we avoid further complex occasions, treat her infection, get her on a good dialysis regimen, and after many days to weeks she may regain her strength and continue living  In the best case scenario she could live many weeks to months; maybe even years.  In the worst case scenario she was critically ill and at risk complications that could become steadily during this hospitalization.  She understood.  Again, she was able to teach his back to me.    We discussed her values.  She was not entirely sure what she was hoping for.  She was not sure what more she was willing to go through.  She was uncertain if she was willing to continue with hospitalization, dialysis, heart procedures, etc..  She worries about losing her independence and her ability make her own decisions.  She fears being held captive.  She does not fear dying.  She finds great strength in her Mandaeism henrry.    We spoke back and forth.  We discussed the path forward.  I explained that we can continue with usual care in reiterated we spoke about above.  Alternatively, if she was unwilling to pursue the  procedures and supportive measures in order to live then hospice is a reasonable option.  I spoke in detail about hospice in the philosophy hospice care.  I have fully explained with hospice I suspect her prognosis would be best thought of in terms of hours to days; maybe weeks she was madan.  She understood.    We again spoke back forth.  Did my best educate her on the realities of any decision she makes.    At the end of our conversation she told me she wishes to go home tomorrow with hospice.  They told her I thought this would be a reasonable decision.  However, I recommended that we wait to pursue hospice as I want to ensure values and desires are consistent.  I also want to ensure family is up-to-date and she has the appropriate support to pursue hospice care she understood.    Today, I would simply continue with appropriate supportive measures.  I question her capacity and I think it is very important to ensure she is consistent prior to making any major changes in her care.  Discussed with her primary team.    I will follow up with her and her family tomorrow.    I appreciate being involved.  Hope I have been helpful.

## 2024-07-02 NOTE — PHYSICIAN QUERY
Please provide the diagnosis or diagnoses associated with the clinical findings:  History of possible renal cell carcinoma

## 2024-07-02 NOTE — PT/OT/SLP PROGRESS
Occupational Therapy   Treatment    Name: Juhi Taylor  MRN: [id number]  Admitting Diagnosis:  Chronic kidney disease (CKD), stage V  9 Days Post-Op    Recommendations:     Discharge Recommendations: High Intensity Therapy  Discharge Equipment Recommendations:  3-in-1 commode, grab bar, other (see comments), rollator (reacher and dressing aids)  Barriers to discharge:   (Increased physical assistanc with ADLs and functional mobility.)    Assessment:     Juhi Taylor is a 74 y.o. female with a medical diagnosis of Chronic kidney disease (CKD), stage V.  She presents with general weakness. Patient participated in grooming and LB dressing while sitting in a chair. Performance deficits affecting function are weakness, impaired endurance, impaired self care skills, impaired functional mobility, gait instability, impaired balance, decreased safety awareness, decreased lower extremity function, decreased upper extremity function, impaired cardiopulmonary response to activity.     Rehab Prognosis:  Good; patient would benefit from acute skilled OT services to address these deficits and reach maximum level of function.       Plan:     Patient to be seen 5 x/week to address the above listed problems via self-care/home management, therapeutic activities, therapeutic exercises  Plan of Care Expires: 07/29/24  Plan of Care Reviewed with: patient    Subjective     Chief Complaint: General weakness  Patient/Family Comments/goals: Improved functional mobility and ADL independence.  Pain/Comfort:  Pain Rating 1: 0/10  Pain Rating Post-Intervention 1: 0/10    Objective:     Communicated with: nurse prior to session.  Patient found up in chair with telemetry, peripheral IV, pulse ox (continuous), oxygen upon OT entry to room.    General Precautions: Standard, fall    Orthopedic Precautions:N/A  Braces: N/A  Respiratory Status: Nasal cannula, flow 2 L/min     Occupational Performance:     Activities of Daily  Living:  Grooming: contact guard assistance to brush teeth, wash face and brush hair sitting in chair.  Lower Body Dressing: moderate assistance to don socks sitting in chair.      [facility] 6 Click ADL: 16    Treatment & Education:  Patient encouraged to sit up in chair for at least 2 hours.     Patient left up in chair with all lines intact and call button in reach    GOALS:   Multidisciplinary Problems       Occupational Therapy Goals          Problem: Occupational Therapy    Goal Priority Disciplines Outcome Interventions   Occupational Therapy Goal     OT, PT/OT Progressing    Description: Goals to be met by: 7/20/2024     Patient will increase functional independence with ADLs by performing:    UE Dressing with Supervision.  LE Dressing with Mod A.  Grooming while seated min A.   Toileting from toilet with mod A for hygiene and clothing management.   Patient will tolerance 2 minutes of therapeutic activities displaying fair endurance for improved independence during ADLs.                          Time Tracking:     OT Date of Treatment: 07/02/24  OT Start Time: 0916  OT Stop Time: 0950  OT Total Time (min): 34 min    Billable Minutes:Self Care/Home Management 34    OT/DAYSI: OT     Number of DAYSI visits since last OT visit: 0    7/2/2024

## 2024-07-02 NOTE — NURSING
Pt. Very emotional, crying, and refused BP, temp and any thing that would require me to touch her. She is currently sitting up in the chair. HR controlled. Awaiting family consult w/ palliative team.

## 2024-07-02 NOTE — PROGRESS NOTES
[facility]  Palliative Medicine  Progress Note    Patient Name: Juhi Taylor  MRN: [id number]  Admission Date: 6/18/2024  Hospital Length of Stay: 13 days  Code Status: Full Code   Attending Provider: Caleb Quintero MD  Consulting Provider: Bubba Romero MD  Primary Care Physician: Tony Sadler MD  Principal Problem:Chronic kidney disease (CKD), stage V    Patient information was obtained from patient, relative(s), past medical records, and primary team.      Assessment/Plan:     Palliative Care  Goals of care, counseling/discussion  I reviewed the patient's chart and discussed the case with the patient's team.      I examined Juhi Taylor at bedside.  The patient lacks capacity for complex medical decision-making.  I spoke with her and her daughters at bedside.    They are well up to date on her condition. They discussed her multiple medical issues including kidney failure, heart failure, cardiac arrest and various complications throughout this hospitalization.     We discussed her prognosis which is guarded. They understand that she is critically ill and at a high risk of death during this hospitalization. In the best case scenario she is likely to have many weeks or months of recovery.    At this point she and her family are willing to pursue any available measure to prolong her life. Even if that means prolonged hospitalization, further surgical procedures, long term dialysis, etc. She is fighting to get back home to her family. She finds strength in her Scientologist henrry.     At this point family is very anxious to transfer to [facility] for cardiac intervention. They are hesitant to allow further intervention here at [facility] due to her multiple complications. I fully explained that transfer could take many days. Without dialysis access she will not have dialysis  and therefore would be at a high risk of death. If her goals are to live by any means necessary I  "encourage her/them to allow dialysis access and continued supportive measures while she is awaiting transfer. If not, we need to discuss hospice. They understood and are agreeable to dialysis catheter placement. They do not desire hospice.    Primary team is aware of the above. Surgery has been consulted for catheter placement.     She and her family respectfully asked that I step away from her care. I will honor their request. They have my card with my direct contact information and know to call if I can be of any help.     I appreciate being involved. I hope I have been helpful.     I will sign off at family request. If I can be helpful in anyway, please do not hesitate to call. Happy to revisit anytime.            I will sign off. Please contact us if you have any additional questions.    Subjective:     Chief Complaint:   Chief Complaint   Patient presents with    Shortness of Breath     SOB x 2 weeks since surgery. 89 % on RA       HPI:   Per admit H&P: "73 year old pt getting admitted with fluid overload in need of dialysis  Pt had PD catheter insertion 2 weeks ago  She was supposed to start on PD for ESRD but got delayed due to insurance issues  She has oliguria/increased weight gain in past 2 weeks  Also SOB on exertion and cough  Symptoms got worse and she came to ER and got admitted  Pt was started on lasix gtt iv"    Since admission she was undergone a very complicated hospitalization related to septic shock from infected catheter, she was been started on hemodialysis, she was been treated for heart failure exacerbation, and she has experienced cardiac arrest requiring ACLS protocol with intubation.  Fortunately, she has been extubated.  Nonetheless, she remains very ill with a guarded prognosis.  I have been asked to assist goals of care.    Hospital Course:  No notes on file    Interval History: No major changes    Past Medical History:   Diagnosis Date    Anticoagulant long-term use     Arthritis     " "Breast cancer 2014    invasive lobular carcinoma    Cancer of kidney 11/2020    RIGHT KIDNEY CANCER    CHF (congestive heart failure)     Coronary artery disease dx 2005    Depression     Diabetes mellitus     Diastolic heart failure secondary to hypertension     Gout     Hyperlipemia     Hypertension     Hypertrophy of nasal turbinates     Kidney mass 2020    Right    Levoscoliosis     Lung nodule     left    Multiple thyroid nodules     NICOLE (obstructive sleep apnea)     uses C-PAP    Pulmonary hypertension        Past Surgical History:   Procedure Laterality Date    AORTOGRAPHY N/A 12/04/2020    Procedure: Aortogram;  Surgeon: Paul Pedersen MD;  Location: [facility] CATH;  Service: Cardiology;  Laterality: N/A;    BREAST SURGERY      CARDIAC CATHETERIZATION  12/2020    CHOLECYSTECTOMY      COLONOSCOPY      multi -last 2014     CORONARY ARTERY BYPASS GRAFT      ESOPHAGOGASTRODUODENOSCOPY      2012     HAND SURGERY Right     INSERTION OF CATHETER N/A 6/23/2024    Procedure: Insertion,catheter;  Surgeon: Meli Griffin MD;  Location: [facility] OR;  Service: Vascular;  Laterality: N/A;  ORIGINAL CATHETER WAS REPOSITIONED.  NO NEW CATHETER WAS PLACED    INSERTION, CATHETER, DIALYSIS, PERITONEAL N/A 6/4/2024    Procedure: IN Insertion Catheter, Dialysis, Peritoneal - laparoscopic;  Surgeon: Rodriguez Catalan Jr., MD;  Location: [facility] OR;  Service: General;  Laterality: N/A;    LAPAROSCOPIC ROBOT-ASSISTED SURGICAL REMOVAL OF KIDNEY USING DA CHELLE XI Right 03/10/2022    Procedure: XI ROBOTIC NEPHRECTOMY- radical;  Surgeon: Rolando Ramirez MD;  Location: [facility] OR;  Service: Urology;  Laterality: Right;    MASTECTOMY W/ SENTINEL NODE BIOPSY Bilateral 01/21/2015    bilateral "dog ears"    NASAL SINUS SURGERY  2015    Dr Bryant FESS/cauterization turbinate     PARTIAL HYSTERECTOMY  1989    PERCUTANEOUS TRANSLUMINAL BALLOON ANGIOPLASTY OF CORONARY ARTERY  12/04/2020    Procedure: Angioplasty-coronary;  Surgeon: Paul Pedersen, " MD;  Location: [facility] CATH;  Service: Cardiology;;    RENAL BIOPSY Right     9/20/2021 EJ    TUBAL LIGATION      ULTRASOUND GUIDANCE  12/04/2020    Procedure: Ultrasound Guidance;  Surgeon: Paul Pedersen MD;  Location: [facility] CATH;  Service: Cardiology;;       Review of patient's allergies indicates:   Allergen Reactions    Percocet [oxycodone-acetaminophen] Itching    Amiodarone analogues      Itching      Irbesartan Swelling    Januvia [sitagliptin]     Jardiance [empagliflozin]      Leg cramps    Lipitor [atorvastatin] Other (See Comments)     Severe leg pain    Linaclotide Other (See Comments) and Nausea And Vomiting     Does not remember    Lubiprostone Other (See Comments) and Palpitations     Does not remember       Medications:  Continuous Infusions:   dilTIAZem  5 mg/hr Intravenous Continuous 5 mL/hr at 07/02/24 1338 5 mg/hr at 07/02/24 1338     Scheduled Meds:   ceFEPime IV (PEDS and ADULTS)  2 g Intravenous Q24H    epoetin alaina-epbx  50 Units/kg Intravenous Every Mon, Wed, Fri    famotidine  20 mg Oral Daily    furosemide (LASIX) injection  40 mg Intravenous Q12H    guaiFENesin 100 mg/5 ml  400 mg Oral BID    heparin (porcine)  5,000 Units Subcutaneous Q8H    metoprolol tartrate  25 mg Oral BID     PRN Meds:  Current Facility-Administered Medications:     acetaminophen, 650 mg, Oral, Q8H PRN    aluminum-magnesium hydroxide-simethicone, 30 mL, Oral, QID PRN    dextrose 50%, 12.5 g, Intravenous, PRN    dextrose 50%, 25 g, Intravenous, PRN    glucagon (human recombinant), 1 mg, Intramuscular, PRN    glucose, 24 g, Oral, PRN    melatonin, 6 mg, Oral, Nightly PRN    ondansetron, 4 mg, Intravenous, Q6H PRN    Pharmacy to dose Vancomycin consult, , , Once **AND** vancomycin - pharmacy to dose, , Intravenous, pharmacy to manage frequency    Family History       Problem Relation (Age of Onset)    Asthma Daughter    Birth defects Daughter    Breast cancer Mother, Maternal Aunt    Depression Daughter    Drug abuse  Daughter, Daughter    Glaucoma Sister    Hepatitis Brother    Hypertension Father    Learning disabilities Daughter    Mental illness Daughter    Stroke Father          Tobacco Use    Smoking status: Former     Current packs/day: 0.00     Types: Cigarettes     Start date: 2016     Quit date: 2016     Years since quittin.5    Smokeless tobacco: Never    Tobacco comments:     quit    Substance and Sexual Activity    Alcohol use: No    Drug use: No    Sexual activity: Not Currently     Partners: Male     Birth control/protection: None       Review of Systems  Objective:     Vital Signs (Most Recent):  Temp: 98.6 °F (37 °C) (24 1130)  Pulse: 87 (24 1505)  Resp: (!) 23 (24 1505)  BP: (!) 131/97 (24 1142)  SpO2: 98 % (24 1505) Vital Signs (24h Range):  Temp:  [97 °F (36.1 °C)-98.6 °F (37 °C)] 98.6 °F (37 °C)  Pulse:  [] 87  Resp:  [18-33] 23  SpO2:  [78 %-100 %] 98 %  BP: ()/() 131/97     Weight: 132.3 kg (291 lb 10.7 oz)  Body mass index is 45.68 kg/m².       Physical Exam  Vitals reviewed.   Constitutional:       General: She is not in acute distress.     Appearance: She is ill-appearing.   HENT:      Head: Normocephalic and atraumatic.      Right Ear: External ear normal.      Left Ear: External ear normal.      Nose: Nose normal. No congestion.      Mouth/Throat:      Mouth: Mucous membranes are moist.      Pharynx: No oropharyngeal exudate.   Eyes:      General:         Right eye: No discharge.         Left eye: No discharge.      Extraocular Movements: Extraocular movements intact.   Cardiovascular:      Rate and Rhythm: Tachycardia present.   Pulmonary:      Effort: Pulmonary effort is normal. No respiratory distress.   Abdominal:      General: There is distension.      Palpations: Abdomen is soft.      Tenderness: There is no abdominal tenderness.   Neurological:      General: No focal deficit present.      Mental Status: She is alert.    Psychiatric:         Mood and Affect: Mood normal.         Behavior: Behavior normal.      Comments: Her thought content and judgement remain questionable            Review of Symptoms      Symptom Assessment (ESAS 0-10 Scale)  Pain:  0  Dyspnea:  2  Anxiety:  0  Nausea:  0  Depression:  0  Anorexia:  2  Fatigue:  5  Insomnia:  0  Restlessness:  0  Agitation:  4         Performance Status:  50    Psychosocial/Cultural:   See Palliative Psychosocial Note: No  **Primary  to Follow**  Palliative Care  Consult: No        Advance Care Planning  Advance Directives:     Decision Making:  Patient answered questions and Family answered questions  Goals of Care: The patient and family endorses that what is most important right now is to focus on extending life as long as possible, even it it means sacrificing quality    Accordingly, we have decided that the best plan to meet the patient's goals includes continuing with treatment           Significant Labs: BMP:   Recent Labs   Lab 07/02/24  0326         K 4.7      CO2 18*   BUN 72*   CREATININE 6.2*   CALCIUM 8.7   MG 2.4     CBC:   Recent Labs   Lab 07/01/24  0405 07/02/24  0326   WBC 12.89* 10.98   HGB 10.4* 11.3*   HCT 36.1* 39.7    151     CBC:   Recent Labs   Lab 07/02/24  0326   WBC 10.98   HGB 11.3*   HCT 39.7   MCV 89        BMP:  Recent Labs   Lab 07/02/24  0326         K 4.7      CO2 18*   BUN 72*   CREATININE 6.2*   CALCIUM 8.7   MG 2.4     LFT:  Lab Results   Component Value Date    AST 17 06/29/2024    ALKPHOS 96 06/29/2024    BILITOT 1.1 (H) 06/29/2024     Albumin:   Albumin   Date Value Ref Range Status   06/29/2024 3.1 (L) 3.5 - 5.2 g/dL Final     Protein:   Total Protein   Date Value Ref Range Status   06/29/2024 6.1 6.0 - 8.4 g/dL Final     Lactic acid:   Lab Results   Component Value Date    LACTATE 0.9 06/29/2024    LACTATE 1.1 06/27/2024       Significant Imaging: I have  reviewed all pertinent imaging results/findings within the past 24 hours.    > 60 min visit spent in chart review, face to face discussion of goals of care,  symptom assessment, coordination of care and emotional support.    Bubba Romero MD  Palliative Medicine  [facility]

## 2024-07-02 NOTE — CONSULTS
[facility]  Palliative Medicine  Consult Note    Patient Name: Juhi Taylor  MRN: [id number]  Admission Date: 6/18/2024  Hospital Length of Stay: 12 days  Code Status: Full Code   Attending Provider: Caleb Quintero MD  Consulting Provider: Bubba Romero MD  Primary Care Physician: oTny Sadler MD  Principal Problem:Chronic kidney disease (CKD), stage V    Patient information was obtained from patient, past medical records, and primary team.      Inpatient consult to Palliative Care  Consult performed by: Bubba Romero MD  Consult ordered by: Caleb Quintero MD        Assessment/Plan:     Palliative Care  Goals of care, counseling/discussion  I reviewed the patient's chart and discussed the case with the patient's team.      I examined Juhi Taylor at bedside.  The patient has questionable capacity for complex medical decision-making.  I spoke with her at bedside.    I introduced myself and my role as palliative care physician. They were agreeable to speaking.    We discussed the patient's medical illness, prognosis, and values in detail.  Below is a brief summary of our discussion.    The patient was not able to accurately detail her hospitalization.  We did discuss her multitude of issues and her very complicated hospitalization related to kidney failure, heart failure, lung failure, severe infection, and overall critically ill state.  She was able to teach this back to me.    We discussed her prognosis.  I explained that she was a guarded prognosis.  In the best case scenario we avoid further complex occasions, treat her infection, get her on a good dialysis regimen, and after many days to weeks she may regain her strength and continue living  In the best case scenario she could live many weeks to months; maybe even years.  In the worst case scenario she was critically ill and at risk complications that could become steadily during this  hospitalization.  She understood.  Again, she was able to teach his back to me.    We discussed her values.  She was not entirely sure what she was hoping for.  She was not sure what more she was willing to go through.  She was uncertain if she was willing to continue with hospitalization, dialysis, heart procedures, etc..  She worries about losing her independence and her ability make her own decisions.  She fears being held captive.  She does not fear dying.  She finds great strength in her Cheondoism henrry.    We spoke back and forth.  We discussed the path forward.  I explained that we can continue with usual care in reiterated we spoke about above.  Alternatively, if she was unwilling to pursue the procedures and supportive measures in order to live then hospice is a reasonable option.  I spoke in detail about hospice in the philosophy hospice care.  I have fully explained with hospice I suspect her prognosis would be best thought of in terms of hours to days; maybe weeks she was madan.  She understood.    We again spoke back forth.  Did my best educate her on the realities of any decision she makes.    At the end of our conversation she told me she wishes to go home tomorrow with hospice.  They told her I thought this would be a reasonable decision.  However, I recommended that we wait to pursue hospice as I want to ensure values and desires are consistent.  I also want to ensure family is up-to-date and she has the appropriate support to pursue hospice care she understood.    Today, I would simply continue with appropriate supportive measures.  I question her capacity and I think it is very important to ensure she is consistent prior to making any major changes in her care.  Discussed with her primary team.    I will follow up with her and her family tomorrow.    I appreciate being involved.  Hope I have been helpful.            Thank you for your consult. I will follow-up with patient. Please contact us if  "you have any additional questions.    Subjective:     HPI:   Per admit H&P: "73 year old pt getting admitted with fluid overload in need of dialysis  Pt had PD catheter insertion 2 weeks ago  She was supposed to start on PD for ESRD but got delayed due to insurance issues  She has oliguria/increased weight gain in past 2 weeks  Also SOB on exertion and cough  Symptoms got worse and she came to ER and got admitted  Pt was started on lasix gtt iv"    Since admission she was undergone a very complicated hospitalization related to septic shock from infected catheter, she was been started on hemodialysis, she was been treated for heart failure exacerbation, and she has experienced cardiac arrest requiring ACLS protocol with intubation.  Fortunately, she has been extubated.  Nonetheless, she remains very ill with a guarded prognosis.  I have been asked to assist goals of care.    Hospital Course:  No notes on file    Interval History: See HPI    Past Medical History:   Diagnosis Date    Anticoagulant long-term use     Arthritis     Breast cancer 2014    invasive lobular carcinoma    Cancer of kidney 11/2020    RIGHT KIDNEY CANCER    CHF (congestive heart failure)     Coronary artery disease dx 2005    Depression     Diabetes mellitus     Diastolic heart failure secondary to hypertension     Gout     Hyperlipemia     Hypertension     Hypertrophy of nasal turbinates     Kidney mass 2020    Right    Levoscoliosis     Lung nodule     left    Multiple thyroid nodules     NICOLE (obstructive sleep apnea)     uses C-PAP    Pulmonary hypertension        Past Surgical History:   Procedure Laterality Date    AORTOGRAPHY N/A 12/04/2020    Procedure: Aortogram;  Surgeon: Paul Pedersen MD;  Location: [facility];  Service: Cardiology;  Laterality: N/A;    BREAST SURGERY      CARDIAC CATHETERIZATION  12/2020    CHOLECYSTECTOMY      COLONOSCOPY      multi -last 2014     CORONARY ARTERY BYPASS GRAFT      ESOPHAGOGASTRODUODENOSCOPY      2012  " "   HAND SURGERY Right     INSERTION OF CATHETER N/A 6/23/2024    Procedure: Insertion,catheter;  Surgeon: Meli Griffin MD;  Location: [facility] OR;  Service: Vascular;  Laterality: N/A;  ORIGINAL CATHETER WAS REPOSITIONED.  NO NEW CATHETER WAS PLACED    INSERTION, CATHETER, DIALYSIS, PERITONEAL N/A 6/4/2024    Procedure: IN Insertion Catheter, Dialysis, Peritoneal - laparoscopic;  Surgeon: Rodriguez Catalan Jr., MD;  Location[facility] OR;  Service: General;  Laterality: N/A;    LAPAROSCOPIC ROBOT-ASSISTED SURGICAL REMOVAL OF KIDNEY USING DA CHELLE XI Right 03/10/2022    Procedure: XI ROBOTIC NEPHRECTOMY- radical;  Surgeon: Rolando Ramirez MD;  Location: [facility] OR;  Service: Urology;  Laterality: Right;    MASTECTOMY W/ SENTINEL NODE BIOPSY Bilateral 01/21/2015    bilateral "dog ears"    NASAL SINUS SURGERY  2015    Dr Bryant FESS/cauterization turbinate     PARTIAL HYSTERECTOMY  1989    PERCUTANEOUS TRANSLUMINAL BALLOON ANGIOPLASTY OF CORONARY ARTERY  12/04/2020    Procedure: Angioplasty-coronary;  Surgeon: Paul Pedersen MD;  Location: [facility] CATH;  Service: Cardiology;;    RENAL BIOPSY Right     9/20/2021 EJ    TUBAL LIGATION      ULTRASOUND GUIDANCE  12/04/2020    Procedure: Ultrasound Guidance;  Surgeon: Paul Pedersen MD;  Location: [facility] CATH;  Service: Cardiology;;       Review of patient's allergies indicates:   Allergen Reactions    Percocet [oxycodone-acetaminophen] Itching    Amiodarone analogues      Itching      Irbesartan Swelling    Januvia [sitagliptin]     Jardiance [empagliflozin]      Leg cramps    Lipitor [atorvastatin] Other (See Comments)     Severe leg pain    Linaclotide Other (See Comments) and Nausea And Vomiting     Does not remember    Lubiprostone Other (See Comments) and Palpitations     Does not remember       Medications:  Continuous Infusions:   dilTIAZem  5 mg/hr Intravenous Continuous 5 mL/hr at 07/01/24 1809 5 mg/hr at 07/01/24 1809     Scheduled Meds:   ceFEPime IV (PEDS and " ADULTS)  2 g Intravenous Q24H    epoetin alaina-epbx  50 Units/kg Intravenous Every Mon, Wed, Fri    famotidine  20 mg Oral Daily    furosemide (LASIX) injection  40 mg Intravenous Q12H    guaiFENesin 100 mg/5 ml  400 mg Oral BID    heparin (porcine)  5,000 Units Subcutaneous Q8H    metoprolol tartrate  25 mg Oral BID     PRN Meds:  Current Facility-Administered Medications:     acetaminophen, 650 mg, Oral, Q8H PRN    aluminum-magnesium hydroxide-simethicone, 30 mL, Oral, QID PRN    dextrose 50%, 12.5 g, Intravenous, PRN    dextrose 50%, 25 g, Intravenous, PRN    glucagon (human recombinant), 1 mg, Intramuscular, PRN    glucose, 24 g, Oral, PRN    melatonin, 6 mg, Oral, Nightly PRN    ondansetron, 4 mg, Intravenous, Q6H PRN    Pharmacy to dose Vancomycin consult, , , Once **AND** vancomycin - pharmacy to dose, , Intravenous, pharmacy to manage frequency    Family History       Problem Relation (Age of Onset)    Asthma Daughter    Birth defects Daughter    Breast cancer Mother, Maternal Aunt    Depression Daughter    Drug abuse Daughter, Daughter    Glaucoma Sister    Hepatitis Brother    Hypertension Father    Learning disabilities Daughter    Mental illness Daughter    Stroke Father          Tobacco Use    Smoking status: Former     Current packs/day: 0.00     Types: Cigarettes     Start date: 2016     Quit date: 2016     Years since quittin.5    Smokeless tobacco: Never    Tobacco comments:     quit    Substance and Sexual Activity    Alcohol use: No    Drug use: No    Sexual activity: Not Currently     Partners: Male     Birth control/protection: None       Review of Systems  Objective:     Vital Signs (Most Recent):  Temp: 98.5 °F (36.9 °C) (24)  Pulse: (!) 121 (24)  Resp: (!) 23 (24)  BP: 109/87 (24)  SpO2: 97 % (24) Vital Signs (24h Range):  Temp:  [97.8 °F (36.6 °C)-98.6 °F (37 °C)] 98.5 °F (36.9 °C)  Pulse:  [] 121  Resp:   [13-46] 23  SpO2:  [78 %-100 %] 97 %  BP: ()/() 109/87     Weight: 132.3 kg (291 lb 10.7 oz)  Body mass index is 45.68 kg/m².       Physical Exam  Vitals reviewed.   Constitutional:       General: She is not in acute distress.     Appearance: She is ill-appearing.   HENT:      Head: Normocephalic and atraumatic.      Right Ear: External ear normal.      Left Ear: External ear normal.      Nose: Nose normal. No congestion.      Mouth/Throat:      Mouth: Mucous membranes are moist.      Pharynx: No oropharyngeal exudate.   Eyes:      General:         Right eye: No discharge.         Left eye: No discharge.      Extraocular Movements: Extraocular movements intact.   Cardiovascular:      Rate and Rhythm: Tachycardia present.   Pulmonary:      Effort: Pulmonary effort is normal. No respiratory distress.   Abdominal:      General: There is distension.      Palpations: Abdomen is soft.      Tenderness: There is no abdominal tenderness.   Neurological:      General: No focal deficit present.      Mental Status: She is alert and oriented to person, place, and time.   Psychiatric:         Mood and Affect: Mood normal.         Behavior: Behavior normal.      Comments: Her thought content and judgement are questionable            Review of Symptoms      Symptom Assessment (ESAS 0-10 Scale)  Pain:  0  Dyspnea:  2  Anxiety:  0  Nausea:  0  Depression:  0  Anorexia:  2  Fatigue:  5  Insomnia:  0  Restlessness:  0  Agitation:  4         Performance Status:  50    Psychosocial/Cultural:   See Palliative Psychosocial Note: No  **Primary  to Follow**  Palliative Care  Consult: No        Advance Care Planning  Advance Directives:     Decision Making:  Patient answered questions and Family answered questions  Goals of Care: The patient endorses that what is most important right now is to focus on comfort and QOL     Accordingly, we have decided that the best plan to meet the patient's goals includes  enrolling in hospice care. (Today, I do not believe she has capacity for complex medical decision making). Will revisit tomorrow and assess for consistency in her goals.          Significant Labs: BMP:   Recent Labs   Lab 07/01/24  0405   *      K 4.4      CO2 20*   BUN 57*   CREATININE 5.6*   CALCIUM 8.6*   MG 2.4     CBC:   Recent Labs   Lab 06/30/24  0320 07/01/24  0405   WBC 15.29* 12.89*   HGB 10.6* 10.4*   HCT 36.8* 36.1*    196     CBC:   Recent Labs   Lab 07/01/24  0405   WBC 12.89*   HGB 10.4*   HCT 36.1*   MCV 89        BMP:  Recent Labs   Lab 07/01/24  0405   *      K 4.4      CO2 20*   BUN 57*   CREATININE 5.6*   CALCIUM 8.6*   MG 2.4     LFT:  Lab Results   Component Value Date    AST 17 06/29/2024    ALKPHOS 96 06/29/2024    BILITOT 1.1 (H) 06/29/2024     Albumin:   Albumin   Date Value Ref Range Status   06/29/2024 3.1 (L) 3.5 - 5.2 g/dL Final     Protein:   Total Protein   Date Value Ref Range Status   06/29/2024 6.1 6.0 - 8.4 g/dL Final     Lactic acid:   Lab Results   Component Value Date    LACTATE 0.9 06/29/2024    LACTATE 1.1 06/27/2024       Significant Imaging: I have reviewed all pertinent imaging results/findings within the past 24 hours.      I spent a total of 85 minutes on the day of the visit. This includes face to face time in discussion of goals of care, symptom assessment, coordination of care and emotional support.  This also includes non-face to face time preparing to see the patient (eg, review of tests/imaging), obtaining and/or reviewing separately obtained history, documenting clinical information in the electronic or other health record, independently interpreting results and communicating results to the patient/family/caregiver, or care coordinator.    Bubba Romero MD  Palliative Medicine  [facility]

## 2024-07-03 VITALS
RESPIRATION RATE: 21 BRPM | DIASTOLIC BLOOD PRESSURE: 65 MMHG | WEIGHT: 291.69 LBS | HEIGHT: 67 IN | OXYGEN SATURATION: 94 % | BODY MASS INDEX: 45.78 KG/M2 | SYSTOLIC BLOOD PRESSURE: 99 MMHG | TEMPERATURE: 98 F | HEART RATE: 80 BPM

## 2024-07-03 DIAGNOSIS — Z71.89 COMPLEX CARE COORDINATION: ICD-10-CM

## 2024-07-03 PROBLEM — I50.9 ACUTE ON CHRONIC HEART FAILURE: Status: ACTIVE | Noted: 2024-07-03

## 2024-07-03 PROBLEM — K21.9 GERD (GASTROESOPHAGEAL REFLUX DISEASE): Status: RESOLVED | Noted: 2020-11-13 | Resolved: 2024-07-03

## 2024-07-03 PROBLEM — R60.0 LOWER LEG EDEMA: Status: RESOLVED | Noted: 2018-06-28 | Resolved: 2024-07-03

## 2024-07-03 PROBLEM — I50.43 ACUTE ON CHRONIC COMBINED SYSTOLIC AND DIASTOLIC HEART FAILURE: Status: ACTIVE | Noted: 2024-07-03

## 2024-07-03 PROBLEM — I48.91 ATRIAL FIBRILLATION WITH RVR: Status: ACTIVE | Noted: 2024-07-03

## 2024-07-03 LAB — BACTERIA CATH TIP CULT: NO GROWTH

## 2024-07-03 NOTE — NURSING
Patient arrived to CSU per EMS at 0400. Patient's old linens and absorbent pads changed. Patient does not show effort to assist staff with turning and remains on the phone with her daughter during this time. Previously reported by the Alyssa RN caring for the patient was x2 to x3 staff to get patient up to the bedside commode.Bedside commode ordered to her room but until Physical Therapy has worked with her upon this admission, patient advised to stay in bed for her safety. Bed alarm placed on for an extra safety precaution.     Patient settled into her room and oriented on how to call for staff assist with her call button. She has been encouraged to call with any assistance up out of the bed if needing to get up.     Patient's Cardizem drip continued from previous facility until further orders placed.      Dr. Winkler at bedside to see patient at this time. He states that Med Team 3 will be her Hospitalist team. Patient has been uncooperative in answering any questions to staff since arrival and quite hostile with responses that were given. Patient noted as only to self with giving her name and date of birth to primary nurse, writer, at this time.       Will continue to monitor.

## 2024-07-03 NOTE — HPI
Juhi Taylor is a 74-year-old female with prior history of obesity, NICOLE, DMT2, RCC s/p right nephrectomy, CAD s/p PCI to Lcx and LAD in 11/2020 and HFmREF who is admitted as a transfer from [facility] for higher level of care and further cardiac evaluation. Cardiology is consulted for ischemic evaluation.    She initially presented on 6/18/24 to [facility] for hypervolemia in the setting of CKD 4-5 for which she had undergone elective PD catheter placement complicated by catheter site leaking. She had a complex hospital course, was initially placed on furosemide infusion without significant improvement and later underwent placement of tunneled line for HD with volume removal. During her second dialysis session she went into PEA arrest for which she was resuscitated, intubated and transferred to ICU. She was noted to have elevated troponin and subsequent TTE found reduced EF from 40-45% to 20-25% for which coronary angiography was recommended. Her hospital course was also complicated by dialysis line infection, significant bleeding around the catheter site, bradycardia and subsequent atrial fibrillation with RVR. Given the catheter site bleeding, anticoagulation was temporarily held. She has reported allergy to amiodarone, was initially placed on diltiazem for atrial fibrillation despite reduced EF.    She discussed GOC at outside hospital and decided to pursue higher level of care at [facility]. She arrived on 7/3/24 to [facility] with plans for dialysis and angiography. She reports feeling chest pain during prior hospitalization but cannot describe any further in terms of quality or timing. She currently denies chest pain and states her breathing feels okay while in her semi-reclined position. She notes persistent, though improved, lower extremity edema. Her last dialysis was 4 days ago. She thinks she may still make urine but is unsure. Daughter states that she is more somnolent and confused as she has not had dialysis in several  days.

## 2024-07-03 NOTE — SUBJECTIVE & OBJECTIVE
"Past Medical History:   Diagnosis Date    Anticoagulant long-term use     Arthritis     Breast cancer 2014    invasive lobular carcinoma    Cancer of kidney 11/2020    RIGHT KIDNEY CANCER    CHF (congestive heart failure)     Coronary artery disease dx 2005    Depression     Diabetes mellitus     Diastolic heart failure secondary to hypertension     Gout     Hyperlipemia     Hypertension     Hypertrophy of nasal turbinates     Kidney mass 2020    Right    Levoscoliosis     Lung nodule     left    Multiple thyroid nodules     NICOLE (obstructive sleep apnea)     uses C-PAP    Pulmonary hypertension        Past Surgical History:   Procedure Laterality Date    AORTOGRAPHY N/A 12/04/2020    Procedure: Aortogram;  Surgeon: Paul Pedersen MD;  Location: [facility] CATH;  Service: Cardiology;  Laterality: N/A;    BREAST SURGERY      CARDIAC CATHETERIZATION  12/2020    CHOLECYSTECTOMY      COLONOSCOPY      multi -last 2014     CORONARY ARTERY BYPASS GRAFT      ESOPHAGOGASTRODUODENOSCOPY      2012     HAND SURGERY Right     INSERTION OF CATHETER N/A 6/23/2024    Procedure: Insertion,catheter;  Surgeon: Meli Griffin MD;  Location: [facility] OR;  Service: Vascular;  Laterality: N/A;  ORIGINAL CATHETER WAS REPOSITIONED.  NO NEW CATHETER WAS PLACED    INSERTION, CATHETER, DIALYSIS, PERITONEAL N/A 6/4/2024    Procedure: IN Insertion Catheter, Dialysis, Peritoneal - laparoscopic;  Surgeon: Rodriguez Catalan Jr., MD;  Location: [facility] OR;  Service: General;  Laterality: N/A;    LAPAROSCOPIC ROBOT-ASSISTED SURGICAL REMOVAL OF KIDNEY USING DA CHELLE XI Right 03/10/2022    Procedure: XI ROBOTIC NEPHRECTOMY- radical;  Surgeon: Rolando Ramirez MD;  Location: [facility] OR;  Service: Urology;  Laterality: Right;    MASTECTOMY W/ SENTINEL NODE BIOPSY Bilateral 01/21/2015    bilateral "dog ears"    NASAL SINUS SURGERY  2015    Dr Bryant FESS/cauterization turbinate     PARTIAL HYSTERECTOMY  1989    PERCUTANEOUS TRANSLUMINAL BALLOON ANGIOPLASTY OF " CORONARY ARTERY  12/04/2020    Procedure: Angioplasty-coronary;  Surgeon: Paul Pedersen MD;  [facility];  Service: Cardiology;;    RENAL BIOPSY Right     9/20/2021 EJ    TUBAL LIGATION      ULTRASOUND GUIDANCE  12/04/2020    Procedure: Ultrasound Guidance;  Surgeon: Paul Pedersen MD;  [facility] CATH;  Service: Cardiology;;       Review of patient's allergies indicates:   Allergen Reactions    Percocet [oxycodone-acetaminophen] Itching    Amiodarone analogues      Itching      Irbesartan Swelling    Januvia [sitagliptin]     Jardiance [empagliflozin]      Leg cramps    Lipitor [atorvastatin] Other (See Comments)     Severe leg pain    Linaclotide Other (See Comments) and Nausea And Vomiting     Does not remember    Lubiprostone Other (See Comments) and Palpitations     Does not remember       Current Facility-Administered Medications on File Prior to Encounter   Medication    [COMPLETED] desmopressin (DDAVP) 39.692 mcg in 0.9% NaCl 50 mL IVPB    [DISCONTINUED] acetaminophen tablet 650 mg    [DISCONTINUED] aluminum-magnesium hydroxide-simethicone 200-200-20 mg/5 mL suspension 30 mL    [DISCONTINUED] cefepime 2 g in dextrose 5% 100 mL IVPB (ready to mix)    [DISCONTINUED] dextrose 50% injection 12.5 g    [DISCONTINUED] dextrose 50% injection 25 g    [DISCONTINUED] diltiaZEM 100 mg in dextrose 5% 100 mL IVPB (ready to mix) (non-titrating)    [DISCONTINUED] epoetin alaina-epbx injection 7,400 Units    [DISCONTINUED] famotidine tablet 20 mg    [DISCONTINUED] furosemide injection 40 mg    [DISCONTINUED] glucagon (human recombinant) injection 1 mg    [DISCONTINUED] glucose chewable tablet 24 g    [DISCONTINUED] guaiFENesin 100 mg/5 ml syrup 400 mg    [DISCONTINUED] heparin (porcine) injection 5,000 Units    [DISCONTINUED] hydrOXYzine HCL tablet 25 mg    [DISCONTINUED] melatonin tablet 6 mg    [DISCONTINUED] metoprolol tartrate (LOPRESSOR) tablet 25 mg    [DISCONTINUED] ondansetron injection 4 mg     [DISCONTINUED] vancomycin - pharmacy to dose     Current Outpatient Medications on File Prior to Encounter   Medication Sig    allopurinoL (ZYLOPRIM) 300 MG tablet Take 1 tablet (300 mg total) by mouth once daily.    amLODIPine (NORVASC) 10 MG tablet Take 10 mg by mouth once daily.    blood sugar diagnostic (TRUE METRIX GLUCOSE TEST STRIP) Strp Use 1x daily. Insurance preferred.    blood sugar diagnostic Strp To check BG 1 time daily, to use with insurance preferred meter    calcitRIOL (ROCALTROL) 0.5 MCG Cap Take 0.5 mcg by mouth once daily.    carvediloL (COREG) 25 MG tablet Take 1 tablet (25 mg total) by mouth 2 (two) times daily.    cloNIDine (CATAPRES) 0.1 MG tablet Take 1 tablet (0.1 mg total) by mouth 3 (three) times daily as needed (PRN SBP > 165 mmHg).    cloNIDine 0.1 mg/24 hr td ptwk (CATAPRES) 0.1 mg/24 hr Place 1 patch onto the skin every 7 days.    cyanocobalamin (VITAMIN B-12) 100 MCG tablet Take 100 mcg by mouth once daily.    ELIQUIS 5 mg Tab TAKE 1 TABLET(5 MG) BY MOUTH TWICE DAILY    evolocumab (REPATHA SURECLICK) 140 mg/mL PnIj Inject 1 mL (140 mg total) into the skin every 14 (fourteen) days.    furosemide (LASIX) 40 MG tablet Take 40 mg by mouth once daily.    lancets Misc To check BG 1 times daily, to use with insurance preferred meter    loratadine (CLARITIN) 10 mg tablet Take 10 mg by mouth once daily.    magnesium oxide (MAG-OX) 400 mg (241.3 mg magnesium) tablet Take 1 tablet (400 mg total) by mouth daily as needed (cramping).    nitroGLYCERIN (NITROSTAT) 0.4 MG SL tablet Place 1 tablet (0.4 mg total) under the tongue every 5 (five) minutes as needed for Chest pain.    [DISCONTINUED] aspirin (ECOTRIN) 81 MG EC tablet Take 1 tablet (81 mg total) by mouth once daily.    [DISCONTINUED] DULoxetine (CYMBALTA) 20 MG capsule TAKE ONE CAPSULE BY MOUTH ONCE DAILY    [DISCONTINUED] folic acid (FOLVITE) 1 MG tablet Take 1 mg by mouth once daily.    [DISCONTINUED] metFORMIN (GLUCOPHAGE-XR) 500 MG ER  24hr tablet Take 2 tablets (1,000 mg total) by mouth 2 (two) times daily with meals.    [DISCONTINUED] sodium bicarbonate 650 MG tablet Take 1 tablet (650 mg total) by mouth once daily.     Family History       Problem Relation (Age of Onset)    Asthma Daughter    Birth defects Daughter    Breast cancer Mother, Maternal Aunt    Depression Daughter    Drug abuse Daughter, Daughter    Glaucoma Sister    Hepatitis Brother    Hypertension Father    Learning disabilities Daughter    Mental illness Daughter    Stroke Father          Tobacco Use    Smoking status: Former     Current packs/day: 0.00     Types: Cigarettes     Start date: 2016     Quit date: 2016     Years since quittin.5    Smokeless tobacco: Never    Tobacco comments:     quit    Substance and Sexual Activity    Alcohol use: No    Drug use: No    Sexual activity: Not Currently     Partners: Male     Birth control/protection: None     Review of Systems   Unable to perform ROS: Mental status change   Constitutional: Positive for malaise/fatigue and weight gain.   Cardiovascular:  Positive for leg swelling. Negative for palpitations.   Gastrointestinal:  Negative for vomiting.     Objective:     Vital Signs (Most Recent):  Temp: 98.2 °F (36.8 °C) (24)  Pulse: 68 (24)  Resp: 20 (24)  BP: 120/72 (24)  SpO2: 100 % (24) Vital Signs (24h Range):  Temp:  [97.6 °F (36.4 °C)-98.6 °F (37 °C)] 98.2 °F (36.8 °C)  Pulse:  [] 68  Resp:  [19-35] 20  SpO2:  [88 %-100 %] 100 %  BP: ()/(55-97) 120/72     Weight: (!) 137 kg (302 lb 0.5 oz)  Body mass index is 47.3 kg/m².    SpO2: 100 %       No intake or output data in the 24 hours ending 24 0829    Lines/Drains/Airways       Drain  Duration             Female External Urinary Catheter w/ Suction 24 0430 <1 day              Peripheral Intravenous Line  Duration                  Peripheral IV - Single Lumen 24 1800 18 G  3/4 in No Right Antecubital 1 day                     Physical Exam  Vitals and nursing note reviewed.   Constitutional:       General: She is not in acute distress.     Appearance: She is obese. She is ill-appearing. She is not toxic-appearing.      Interventions: Nasal cannula in place.   HENT:      Head: Normocephalic and atraumatic.   Eyes:      Extraocular Movements: Extraocular movements intact.      Pupils: Pupils are equal, round, and reactive to light.   Cardiovascular:      Rate and Rhythm: Normal rate. Rhythm irregular.      Pulses:           Radial pulses are 2+ on the right side and 2+ on the left side.      Heart sounds: Heart sounds are distant.      Gallop present. S3 sounds present.   Pulmonary:      Effort: Pulmonary effort is normal. No respiratory distress.      Breath sounds: No stridor. No wheezing or rhonchi.   Abdominal:      General: There is no distension.      Palpations: Abdomen is soft.      Tenderness: There is no abdominal tenderness. There is no guarding.   Musculoskeletal:      Cervical back: Normal range of motion and neck supple.      Right lower leg: 3+ Pitting Edema present.      Left lower leg: 3+ Pitting Edema present.   Skin:     General: Skin is cool.   Neurological:      General: No focal deficit present.      Mental Status: She is lethargic.      Motor: Weakness present.      Gait: Gait abnormal.   Psychiatric:         Mood and Affect: Mood normal.         Behavior: Behavior normal. Behavior is cooperative.          Significant Labs: All pertinent lab results from the last 24 hours have been reviewed.    Significant Imaging: Echocardiogram: Transthoracic echo (TTE) complete (Cupid Only):   Results for orders placed or performed during the hospital encounter of 06/18/24   Echo   Result Value Ref Range    BSA 2.64 m2    Narrative      Left Ventricle: The left ventricle is normal in size. Normal wall   thickness. Severe global hypokinesis present. There is severely reduced    systolic function with a visually estimated ejection fraction of 20 - 25%.   Grade III diastolic dysfunction.    Right Ventricle: Mild right ventricular enlargement. Wall thickness is   normal. Right ventricle wall motion has global hypokinesis. Systolic   function is moderately reduced.    Left Atrium: Left atrium is moderately dilated.    Tricuspid Valve: There is moderate regurgitation with an anteromedial   eccentrically directed jet.    Overall the study quality was technically difficult. The study was   difficult due to patient's clinical status and body habitus.

## 2024-07-03 NOTE — PLAN OF CARE
Problem: Adult Inpatient Plan of Care  Goal: Plan of Care Review  7/3/2024 0758 by Marcelo Goode RN  Outcome: Progressing  7/3/2024 0750 by Marcelo Goode RN  Outcome: Progressing  Goal: Patient-Specific Goal (Individualized)  7/3/2024 0758 by Marcelo Goode RN  Outcome: Progressing  7/3/2024 0750 by Marcelo Goode RN  Outcome: Progressing  Goal: Absence of Hospital-Acquired Illness or Injury  7/3/2024 0758 by Marcelo Goode RN  Outcome: Progressing  7/3/2024 0750 by Marcelo Goode RN  Outcome: Progressing  Goal: Optimal Comfort and Wellbeing  7/3/2024 0758 by Marcelo Goode RN  Outcome: Progressing  7/3/2024 0750 by Marcelo Goode RN  Outcome: Progressing  Goal: Readiness for Transition of Care  7/3/2024 0758 by Marcelo Goode RN  Outcome: Progressing  7/3/2024 0750 by Marcelo Goode RN  Outcome: Progressing

## 2024-07-03 NOTE — ASSESSMENT & PLAN NOTE
74-year-old female with DMT2, ESRD on HD, pAF, CAD s/p PCI and HFrEF is admitted as a transfer for acute HFrEF in setting of CKD5 and recent PEA arrest during dialysis. PEA likely due to complications related to dialysis.    Recommendations:  - optimize volume status with HD per nephrology  - will reassess status post dialysis for further GDMT   - not a candidate for SGLT2 due to HD   - will consider ACE/ARB/ARNI as BP allows   - currently on BB with metoprolol tartrate for atrial fibrillation  - will defer angiography at this time, will consider ischemic workup with potential PET stress  - will assess need for CRT-D when stabilized, likely in outpatient setting

## 2024-07-03 NOTE — ASSESSMENT & PLAN NOTE
Last HgbA1c 6.8. Hold hypoglycemic medications while admitted.    Q4h glucose checks  Consider SSI as needed

## 2024-07-03 NOTE — PROGRESS NOTES
07/03/24 1545   Peritoneal Dialysis   Exchange Type Manual   Peritoneal Treatment Status Completed   Manual Peritoneal Dialysis   Manual Drain Volume (mL) 800 mL   Effluent Appearance Yellow   Manual Treatment Comments capd completed     CAPD manual exchange completed. Cultures collected and sent to lab. Drained 800 mL yellow effluent without difficulty. Pt line clamped, betadine cap placed aseptically, and line secured to pt's abdomen with tape.

## 2024-07-03 NOTE — SUBJECTIVE & OBJECTIVE
"Past Medical History:   Diagnosis Date    Anticoagulant long-term use     Arthritis     Breast cancer 2014    invasive lobular carcinoma    Cancer of kidney 11/2020    RIGHT KIDNEY CANCER    CHF (congestive heart failure)     Coronary artery disease dx 2005    Depression     Diabetes mellitus     Diastolic heart failure secondary to hypertension     Gout     Hyperlipemia     Hypertension     Hypertrophy of nasal turbinates     Kidney mass 2020    Right    Levoscoliosis     Lung nodule     left    Multiple thyroid nodules     NICOLE (obstructive sleep apnea)     uses C-PAP    Pulmonary hypertension        Past Surgical History:   Procedure Laterality Date    AORTOGRAPHY N/A 12/04/2020    Procedure: Aortogram;  Surgeon: Paul Pedersen MD;  Location: [facility] CATH;  Service: Cardiology;  Laterality: N/A;    BREAST SURGERY      CARDIAC CATHETERIZATION  12/2020    CHOLECYSTECTOMY      COLONOSCOPY      multi -last 2014     CORONARY ARTERY BYPASS GRAFT      ESOPHAGOGASTRODUODENOSCOPY      2012     HAND SURGERY Right     INSERTION OF CATHETER N/A 6/23/2024    Procedure: Insertion,catheter;  Surgeon: Meli Griffin MD;  Location: [facility] OR;  Service: Vascular;  Laterality: N/A;  ORIGINAL CATHETER WAS REPOSITIONED.  NO NEW CATHETER WAS PLACED    INSERTION, CATHETER, DIALYSIS, PERITONEAL N/A 6/4/2024    Procedure: IN Insertion Catheter, Dialysis, Peritoneal - laparoscopic;  Surgeon: Rodriguez Catalan Jr., MD;  Location: [facility] OR;  Service: General;  Laterality: N/A;    LAPAROSCOPIC ROBOT-ASSISTED SURGICAL REMOVAL OF KIDNEY USING DA CHELLE XI Right 03/10/2022    Procedure: XI ROBOTIC NEPHRECTOMY- radical;  Surgeon: Rolando Ramirez MD;  Location: [facility] OR;  Service: Urology;  Laterality: Right;    MASTECTOMY W/ SENTINEL NODE BIOPSY Bilateral 01/21/2015    bilateral "dog ears"    NASAL SINUS SURGERY  2015    Dr Bryant FESS/cauterization turbinate     PARTIAL HYSTERECTOMY  1989    PERCUTANEOUS TRANSLUMINAL BALLOON ANGIOPLASTY OF " CORONARY ARTERY  12/04/2020    Procedure: Angioplasty-coronary;  Surgeon: Paul Pedersen MD;  Location: [facility] CATH;  Service: Cardiology;;    RENAL BIOPSY Right     9/20/2021 EJ    TUBAL LIGATION      ULTRASOUND GUIDANCE  12/04/2020    Procedure: Ultrasound Guidance;  Surgeon: Paul Pedersen MD;  Location: [facility] CATH;  Service: Cardiology;;       Review of patient's allergies indicates:   Allergen Reactions    Percocet [oxycodone-acetaminophen] Itching    Amiodarone analogues      Itching      Irbesartan Swelling    Januvia [sitagliptin]     Jardiance [empagliflozin]      Leg cramps    Lipitor [atorvastatin] Other (See Comments)     Severe leg pain    Linaclotide Other (See Comments) and Nausea And Vomiting     Does not remember    Lubiprostone Other (See Comments) and Palpitations     Does not remember     Current Facility-Administered Medications   Medication Frequency    ceFEPIme (MAXIPIME) 1 g in D5W 100 mL IVPB (MB+) Q24H    dextromethorphan-guaiFENesin  mg/5 ml liquid 5 mL Q4H PRN    famotidine tablet 20 mg Daily    furosemide injection 40 mg Q12H    gentamicin 0.1 % ointment TID    heparin 25,000 units in dextrose 5% (100 units/ml) IV bolus from bag LOW INTENSITY nomogram - [facility] PRN    heparin 25,000 units in dextrose 5% (100 units/ml) IV bolus from bag LOW INTENSITY nomogram - [facility] PRN    heparin 25,000 units in dextrose 5% 250 mL (100 units/mL) infusion LOW INTENSITY nomogram - [facility] Continuous    metoprolol tartrate (LOPRESSOR) tablet 25 mg BID    vancomycin (VANCOCIN) 500 mg in D5W 100 mL IVPB (MB+) Once    vancomycin - pharmacy to dose pharmacy to manage frequency     Family History       Problem Relation (Age of Onset)    Asthma Daughter    Birth defects Daughter    Breast cancer Mother, Maternal Aunt    Depression Daughter    Drug abuse Daughter, Daughter    Glaucoma Sister    Hepatitis Brother    Hypertension Father    Learning disabilities Daughter    Mental illness Daughter    Stroke Father           Tobacco Use    Smoking status: Former     Current packs/day: 0.00     Types: Cigarettes     Start date: 2016     Quit date: 2016     Years since quittin.5    Smokeless tobacco: Never    Tobacco comments:     quit    Substance and Sexual Activity    Alcohol use: No    Drug use: No    Sexual activity: Not Currently     Partners: Male     Birth control/protection: None     Review of Systems   Cardiovascular:  Positive for leg swelling.   Musculoskeletal:  Positive for arthralgias.   All other systems reviewed and are negative.    Objective:     Vital Signs (Most Recent):  Temp: 98.1 °F (36.7 °C) (24 1201)  Pulse: 75 (24 1201)  Resp: 20 (24 1201)  BP: 130/89 (24 1201)  SpO2: (!) 91 % (24 1201) Vital Signs (24h Range):  Temp:  [97.6 °F (36.4 °C)-98.2 °F (36.8 °C)] 98.1 °F (36.7 °C)  Pulse:  [] 75  Resp:  [19-35] 20  SpO2:  [88 %-100 %] 91 %  BP: ()/(55-95) 130/89     Weight: (!) 137 kg (302 lb 0.5 oz) (24 0439)  Body mass index is 47.3 kg/m².  Body surface area is 2.54 meters squared.    No intake/output data recorded.     Physical Exam  Constitutional:       General: She is not in acute distress.     Appearance: Normal appearance. She is obese.   HENT:      Head: Normocephalic and atraumatic.      Mouth/Throat:      Mouth: Mucous membranes are moist.      Pharynx: Oropharynx is clear.   Cardiovascular:      Rate and Rhythm: Normal rate and regular rhythm.      Heart sounds: Normal heart sounds.   Pulmonary:      Effort: Pulmonary effort is normal.      Breath sounds: Normal breath sounds.   Abdominal:      Palpations: Abdomen is soft.   Musculoskeletal:      Right lower leg: Edema (2+) present.      Left lower leg: Edema (2+) present.   Skin:     General: Skin is warm and dry.   Neurological:      General: No focal deficit present.      Mental Status: She is alert and oriented to person, place, and time.          Significant Labs:  All labs  within the past 24 hours have been reviewed.    Significant Imaging:  All imaging within the past 24 hours have been reviewed.

## 2024-07-03 NOTE — ASSESSMENT & PLAN NOTE
74-year-old female with DMT2, ESRD on HD, pAF, CAD s/p PCI and HFrEF is admitted as a transfer for acute HFrEF in setting of CKD5 and recent PEA arrest during dialysis. Recent PD catheter placement on 6/4 but concerned for a possibly leak from cath. Unable to initiate dialysis for 2 weeks PTA to OSH due to insurance issues. Hospital course complicated by infected tunneled cath removed on 6/30. Transferred here for higher level of care. Initial plan at OSH was to replace tunneled catheter on the left side due to concerns to that right side may not by suitable.     Plan:  - will trial a session today through PD catheter to assess functionality; if tolerated, will plan for nightly PD  - peritoneal fluid studies and cx to r/o peritonitis   - Trend sCr  - Trend RFP; replete electrolytes PRN for goal K > 4, Phos > 3, Mg > 2  - Strict I&Os  - Avoid nephrotoxic agents  - Renally adjust medications

## 2024-07-03 NOTE — CONSULTS
BOBBI consulted for PIV insertion in real time using ultrasound guidance.    Indication: PVA  Gauge and length: 20 g x 1 3/4 inch  Location: [facility]

## 2024-07-03 NOTE — CONSULTS
[facility] - Cardiology Stepdown    Wound Care     Patient Name:  Juhi Taylor  MRN:  [id number]  Date: 7/3/2024  Diagnosis: Severe sepsis     History:  Past Medical History:   Diagnosis Date    Anticoagulant long-term use     Arthritis     Breast cancer 2014    invasive lobular carcinoma    Cancer of kidney 2020    RIGHT KIDNEY CANCER    CHF (congestive heart failure)     Coronary artery disease dx     Depression     Diabetes mellitus     Diastolic heart failure secondary to hypertension     Gout     Hyperlipemia     Hypertension     Hypertrophy of nasal turbinates     Kidney mass     Right    Levoscoliosis     Lung nodule     left    Multiple thyroid nodules     NICOLE (obstructive sleep apnea)     uses C-PAP    Pulmonary hypertension      Social History     Socioeconomic History    Marital status: Single    Number of children: 4   Occupational History    Occupation: retired Psych aid    Tobacco Use    Smoking status: Former     Current packs/day: 0.00     Types: Cigarettes     Start date: 2016     Quit date: 2016     Years since quittin.5    Smokeless tobacco: Never    Tobacco comments:     quit    Substance and Sexual Activity    Alcohol use: No    Drug use: No    Sexual activity: Not Currently     Partners: Male     Birth control/protection: None     Social Determinants of Health     Financial Resource Strain: Medium Risk (2024)    Overall Financial Resource Strain (CARDIA)     Difficulty of Paying Living Expenses: Somewhat hard   Food Insecurity: Food Insecurity Present (2024)    Hunger Vital Sign     Worried About Running Out of Food in the Last Year: Sometimes true     Ran Out of Food in the Last Year: Never true   Transportation Needs: No Transportation Needs (2024)    TRANSPORTATION NEEDS     Transportation : No   Physical Activity: Inactive (2023)    Exercise Vital Sign     Days of Exercise per Week: 0 days     Minutes of Exercise per Session: 0 min    Stress: No Stress Concern Present (6/20/2024)    [employer] [place] [employer] - Occupational Stress Questionnaire     Feeling of Stress : Only a little   Housing Stability: Low Risk  (6/20/2024)    Housing Stability Vital Sign     Unable to Pay for Housing in the Last Year: No     Homeless in the Last Year: No     Precautions:  Allergies as of 07/02/2024 - Reviewed 06/23/2024   Allergen Reaction Noted    Percocet [oxycodone-acetaminophen] Itching 03/15/2022    Amiodarone analogues  01/16/2023    Irbesartan Swelling 12/11/2019    Januvia [sitagliptin]  06/28/2021    Jardiance [empagliflozin]  09/14/2020    Lipitor [atorvastatin] Other (See Comments) 12/08/2020    Linaclotide Other (See Comments) and Nausea And Vomiting 08/30/2017    Lubiprostone Other (See Comments) and Palpitations 08/30/2017       [facility] Assessment Details / Treatment:    Patient seen for wound care: New Consult   Chart reviewed for this encounter.   Labs:   WBC (K/uL)   Date Value   07/03/2024 10.33   07/03/2024 9.05     Glucose (mg/dL)   Date Value   07/03/2024 117 (H)   07/02/2024 108     Albumin (g/dL)   Date Value   07/03/2024 2.6 (L)   06/29/2024 3.1 (L)       Jus Score: 17    Narrative:  Pt seen for WC consultation for L buttock abrasion.  Chart reviewed for this encounter.     Pt found lying in bed w/ daughter at bedside. Pt and daughter state pt does not have a wound on her buttocks. Daughter states she has a bruise. Pt refused assessment.       Bedside nursing to continue care & monitoring.  Bedside nursing to maintain pressure injury prevention interventions    Thank you for the consult. Wound Care will sign off.  Please place a new consult if needed.

## 2024-07-03 NOTE — ASSESSMENT & PLAN NOTE
Chronic, controlled. Latest blood pressure and vitals reviewed-     Temp:  [97.6 °F (36.4 °C)-98.6 °F (37 °C)]   Pulse:  []   Resp:  [19-35]   BP: ()/(55-97)   SpO2:  [88 %-100 %] .   Home meds for hypertension were reviewed and noted below.   Hypertension Medications               amLODIPine (NORVASC) 10 MG tablet Take 10 mg by mouth once daily.    carvediloL (COREG) 25 MG tablet Take 1 tablet (25 mg total) by mouth 2 (two) times daily.    cloNIDine (CATAPRES) 0.1 MG tablet Take 1 tablet (0.1 mg total) by mouth 3 (three) times daily as needed (PRN SBP > 165 mmHg).    cloNIDine 0.1 mg/24 hr td ptwk (CATAPRES) 0.1 mg/24 hr Place 1 patch onto the skin every 7 days.    furosemide (LASIX) 40 MG tablet Take 40 mg by mouth once daily.    nitroGLYCERIN (NITROSTAT) 0.4 MG SL tablet Place 1 tablet (0.4 mg total) under the tongue every 5 (five) minutes as needed for Chest pain.            While in the hospital, will manage blood pressure as follows; Adjust home antihypertensive regimen as follows- only metoprolol tartrate for now. Observe and resume home meds as needed.    Will utilize p.r.n. blood pressure medication only if patient's blood pressure greater than 180/110 and she develops symptoms such as worsening chest pain or shortness of breath.

## 2024-07-03 NOTE — Clinical Note
PRE-EXISTING TRIPLE LUMEN WAS REMOVED FROM THE LEFT INTERNAL JUGULAR VEIN.  MANUAL PRESSURE WAS APPLIED.

## 2024-07-03 NOTE — ASSESSMENT & PLAN NOTE
EF 25% at [facility]. Amiodarone attempted and held for bradycardia and possible allergy.    Consult cards, appreciate recs

## 2024-07-03 NOTE — ASSESSMENT & PLAN NOTE
73F w/ ESRD, HFrEF (LVEF 25%, no ICD yet), CAD s/p PCI (oLCX, mLCx, and pLAD 2020), paroxysmal Atrial fibrillation, hx RCC s/p R nephrectomy, T2DM obesity, and initial outside hospitalization for PD catheter malfunction and ADHF iso of volume overload complicated by PEA arrest with conversion to VT after epi and cardioversion => ROSC and recurrent AF w/ RVR.    Recommendations:  [ ] agree with metoprolol tartrate for rate control  [ ] anticoagulation (currently on heparin gtt)   [ ] if still in RVR and therapeutically anticoagulated after volume optimization/dialysis can consider MILLY/DCCV  [ ] Decision re: CRT-D pending ischemic evaluation, re-evaluation of LV function when optimized.

## 2024-07-03 NOTE — NURSING
Nurses Note -- 4 Eyes      7/3/2024   0400 AM      Skin assessed during: Admit      [] No Altered Skin Integrity Present    []Prevention Measures Documented      [x] Yes- Altered Skin Integrity Present or Discovered   [] LDA Added if Not in Epic (Describe Wound)   [] New Altered Skin Integrity was Present on Admit and Documented in LDA   [] Wound Image Taken    Wound Care Consulted? Yes    Attending Nurse:  NEENA Morrissey      Second RN/Staff Member:    NEENA Romero (Charge) and NEENA Mason

## 2024-07-03 NOTE — PROGRESS NOTES
Pharmacokinetic Initial Assessment: IV Vancomycin    Assessment/Plan:    Patient from [facility] on vancomycin regimen.  Will continue to pulse dose with 500mg once and next vancomycin level to be drawn on 7/4 @ 0500 (w/ AM labs)  Desired empiric serum trough concentration is 15 to 20 mcg/mL    Pharmacy will continue to follow and monitor vancomycin.      Please contact pharmacy at extension [phone] with any questions regarding this assessment.     Thank you for the consult,   Lou Fritzuyen       Patient brief summary:  Juhi Taylor is a 74 y.o. female initiated on antimicrobial therapy with IV Vancomycin for treatment of suspected bacteremia    Drug Allergies:   Review of patient's allergies indicates:   Allergen Reactions    Percocet [oxycodone-acetaminophen] Itching    Amiodarone analogues      Itching      Irbesartan Swelling    Januvia [sitagliptin]     Jardiance [empagliflozin]      Leg cramps    Lipitor [atorvastatin] Other (See Comments)     Severe leg pain    Linaclotide Other (See Comments) and Nausea And Vomiting     Does not remember    Lubiprostone Other (See Comments) and Palpitations     Does not remember       Actual Body Weight:   137kg    Renal Function:   Estimated Creatinine Clearance: 11.9 mL/min (A) (based on SCr of 6.2 mg/dL (H)).,     Dialysis Method (if applicable):  N/A    CBC (last 72 hours):  Recent Labs   Lab Result Units 07/01/24  0405 07/02/24  0326   WBC K/uL 12.89* 10.98   Hemoglobin g/dL 10.4* 11.3*   Hematocrit % 36.1* 39.7   Platelets K/uL 196 151   Gran % % 67.3 64.9   Lymph % % 16.9* 18.3   Mono % % 13.3 13.8   Eosinophil % % 1.9 1.9   Basophil % % 0.3 0.6   Differential Method  Automated Automated       Metabolic Panel (last 72 hours):  Recent Labs   Lab Result Units 07/01/24  0405 07/02/24  0326   Sodium mmol/L 137 136   Potassium mmol/L 4.4 4.7   Chloride mmol/L 105 105   CO2 mmol/L 20* 18*   Glucose mg/dL 114* 108   BUN mg/dL 57* 72*   Creatinine mg/dL 5.6* 6.2*   Magnesium  mg/dL 2.4 2.4       Drug levels (last 3 results):  Recent Labs   Lab Result Units 07/01/24  0405   Vancomycin, Random ug/mL 11.4       Microbiologic Results:  Microbiology Results (last 7 days)       ** No results found for the last 168 hours. **

## 2024-07-03 NOTE — SUBJECTIVE & OBJECTIVE
"Past Medical History:   Diagnosis Date    Anticoagulant long-term use     Arthritis     Breast cancer 2014    invasive lobular carcinoma    Cancer of kidney 11/2020    RIGHT KIDNEY CANCER    CHF (congestive heart failure)     Coronary artery disease dx 2005    Depression     Diabetes mellitus     Diastolic heart failure secondary to hypertension     Gout     Hyperlipemia     Hypertension     Hypertrophy of nasal turbinates     Kidney mass 2020    Right    Levoscoliosis     Lung nodule     left    Multiple thyroid nodules     NICOLE (obstructive sleep apnea)     uses C-PAP    Pulmonary hypertension        Past Surgical History:   Procedure Laterality Date    AORTOGRAPHY N/A 12/04/2020    Procedure: Aortogram;  Surgeon: Paul Pedersen MD;  Location: [facility] CATH;  Service: Cardiology;  Laterality: N/A;    BREAST SURGERY      CARDIAC CATHETERIZATION  12/2020    CHOLECYSTECTOMY      COLONOSCOPY      multi -last 2014     CORONARY ARTERY BYPASS GRAFT      ESOPHAGOGASTRODUODENOSCOPY      2012     HAND SURGERY Right     INSERTION OF CATHETER N/A 6/23/2024    Procedure: Insertion,catheter;  Surgeon: Meli Griffin MD;  Location: [facility] OR;  Service: Vascular;  Laterality: N/A;  ORIGINAL CATHETER WAS REPOSITIONED.  NO NEW CATHETER WAS PLACED    INSERTION, CATHETER, DIALYSIS, PERITONEAL N/A 6/4/2024    Procedure: IN Insertion Catheter, Dialysis, Peritoneal - laparoscopic;  Surgeon: Rodriguez Catalan Jr., MD;  Location: [facility] OR;  Service: General;  Laterality: N/A;    LAPAROSCOPIC ROBOT-ASSISTED SURGICAL REMOVAL OF KIDNEY USING DA CHELLE XI Right 03/10/2022    Procedure: XI ROBOTIC NEPHRECTOMY- radical;  Surgeon: Rolando Ramirez MD;  Location: [facility] OR;  Service: Urology;  Laterality: Right;    MASTECTOMY W/ SENTINEL NODE BIOPSY Bilateral 01/21/2015    bilateral "dog ears"    NASAL SINUS SURGERY  2015    Dr Bryant FESS/cauterization turbinate     PARTIAL HYSTERECTOMY  1989    PERCUTANEOUS TRANSLUMINAL BALLOON ANGIOPLASTY OF " CORONARY ARTERY  12/04/2020    Procedure: Angioplasty-coronary;  Surgeon: Paul Pedersen MD;  [facility];  Service: Cardiology;;    RENAL BIOPSY Right     9/20/2021 EJ    TUBAL LIGATION      ULTRASOUND GUIDANCE  12/04/2020    Procedure: Ultrasound Guidance;  Surgeon: Paul Pedersen MD;  [facility]ATH;  Service: Cardiology;;       Review of patient's allergies indicates:   Allergen Reactions    Percocet [oxycodone-acetaminophen] Itching    Amiodarone analogues      Itching      Irbesartan Swelling    Januvia [sitagliptin]     Jardiance [empagliflozin]      Leg cramps    Lipitor [atorvastatin] Other (See Comments)     Severe leg pain    Linaclotide Other (See Comments) and Nausea And Vomiting     Does not remember    Lubiprostone Other (See Comments) and Palpitations     Does not remember       Current Facility-Administered Medications on File Prior to Encounter   Medication    [COMPLETED] desmopressin (DDAVP) 39.692 mcg in 0.9% NaCl 50 mL IVPB    [DISCONTINUED] acetaminophen tablet 650 mg    [DISCONTINUED] aluminum-magnesium hydroxide-simethicone 200-200-20 mg/5 mL suspension 30 mL    [DISCONTINUED] cefepime 2 g in dextrose 5% 100 mL IVPB (ready to mix)    [DISCONTINUED] dextrose 50% injection 12.5 g    [DISCONTINUED] dextrose 50% injection 25 g    [DISCONTINUED] diltiaZEM 100 mg in dextrose 5% 100 mL IVPB (ready to mix) (non-titrating)    [DISCONTINUED] epoetin alaina-epbx injection 7,400 Units    [DISCONTINUED] famotidine tablet 20 mg    [DISCONTINUED] furosemide injection 40 mg    [DISCONTINUED] glucagon (human recombinant) injection 1 mg    [DISCONTINUED] glucose chewable tablet 24 g    [DISCONTINUED] guaiFENesin 100 mg/5 ml syrup 400 mg    [DISCONTINUED] heparin (porcine) injection 5,000 Units    [DISCONTINUED] hydrOXYzine HCL tablet 25 mg    [DISCONTINUED] melatonin tablet 6 mg    [DISCONTINUED] metoprolol tartrate (LOPRESSOR) tablet 25 mg    [DISCONTINUED] ondansetron injection 4 mg     [DISCONTINUED] vancomycin - pharmacy to dose     Current Outpatient Medications on File Prior to Encounter   Medication Sig    allopurinoL (ZYLOPRIM) 300 MG tablet Take 1 tablet (300 mg total) by mouth once daily.    amLODIPine (NORVASC) 10 MG tablet Take 10 mg by mouth once daily.    blood sugar diagnostic (TRUE METRIX GLUCOSE TEST STRIP) Strp Use 1x daily. Insurance preferred.    blood sugar diagnostic Strp To check BG 1 time daily, to use with insurance preferred meter    calcitRIOL (ROCALTROL) 0.5 MCG Cap Take 0.5 mcg by mouth once daily.    carvediloL (COREG) 25 MG tablet Take 1 tablet (25 mg total) by mouth 2 (two) times daily.    cloNIDine (CATAPRES) 0.1 MG tablet Take 1 tablet (0.1 mg total) by mouth 3 (three) times daily as needed (PRN SBP > 165 mmHg).    cloNIDine 0.1 mg/24 hr td ptwk (CATAPRES) 0.1 mg/24 hr Place 1 patch onto the skin every 7 days.    cyanocobalamin (VITAMIN B-12) 100 MCG tablet Take 100 mcg by mouth once daily.    ELIQUIS 5 mg Tab TAKE 1 TABLET(5 MG) BY MOUTH TWICE DAILY    evolocumab (REPATHA SURECLICK) 140 mg/mL PnIj Inject 1 mL (140 mg total) into the skin every 14 (fourteen) days.    furosemide (LASIX) 40 MG tablet Take 40 mg by mouth once daily.    lancets Misc To check BG 1 times daily, to use with insurance preferred meter    loratadine (CLARITIN) 10 mg tablet Take 10 mg by mouth once daily.    magnesium oxide (MAG-OX) 400 mg (241.3 mg magnesium) tablet Take 1 tablet (400 mg total) by mouth daily as needed (cramping).    nitroGLYCERIN (NITROSTAT) 0.4 MG SL tablet Place 1 tablet (0.4 mg total) under the tongue every 5 (five) minutes as needed for Chest pain.    [DISCONTINUED] aspirin (ECOTRIN) 81 MG EC tablet Take 1 tablet (81 mg total) by mouth once daily.    [DISCONTINUED] DULoxetine (CYMBALTA) 20 MG capsule TAKE ONE CAPSULE BY MOUTH ONCE DAILY    [DISCONTINUED] folic acid (FOLVITE) 1 MG tablet Take 1 mg by mouth once daily.    [DISCONTINUED] metFORMIN (GLUCOPHAGE-XR) 500 MG ER  24hr tablet Take 2 tablets (1,000 mg total) by mouth 2 (two) times daily with meals.    [DISCONTINUED] sodium bicarbonate 650 MG tablet Take 1 tablet (650 mg total) by mouth once daily.     Family History       Problem Relation (Age of Onset)    Asthma Daughter    Birth defects Daughter    Breast cancer Mother, Maternal Aunt    Depression Daughter    Drug abuse Daughter, Daughter    Glaucoma Sister    Hepatitis Brother    Hypertension Father    Learning disabilities Daughter    Mental illness Daughter    Stroke Father          Tobacco Use    Smoking status: Former     Current packs/day: 0.00     Types: Cigarettes     Start date: 2016     Quit date: 2016     Years since quittin.5    Smokeless tobacco: Never    Tobacco comments:     quit    Substance and Sexual Activity    Alcohol use: No    Drug use: No    Sexual activity: Not Currently     Partners: Male     Birth control/protection: None     Review of Systems   Constitutional: Negative for chills and fever.   HENT: Negative.     Cardiovascular:  Positive for dyspnea on exertion, irregular heartbeat and palpitations. Negative for chest pain, near-syncope and syncope.   Respiratory:  Positive for shortness of breath. Negative for cough, sputum production and wheezing.    Gastrointestinal:  Negative for abdominal pain, diarrhea, nausea and vomiting.   Neurological: Negative.    Psychiatric/Behavioral: Negative.     Allergic/Immunologic: Negative.      Objective:     Vital Signs (Most Recent):  Temp: 98.2 °F (36.8 °C) (24)  Pulse: 68 (24)  Resp: 20 (24)  BP: 120/72 (24)  SpO2: 100 % (24) Vital Signs (24h Range):  Temp:  [97.6 °F (36.4 °C)-98.6 °F (37 °C)] 98.2 °F (36.8 °C)  Pulse:  [] 68  Resp:  [19-35] 20  SpO2:  [88 %-100 %] 100 %  BP: ()/(55-97) 120/72       Weight: (!) 137 kg (302 lb 0.5 oz)  Body mass index is 47.3 kg/m².    SpO2: 100 %        Physical Exam  Constitutional:   "     General: She is not in acute distress.     Appearance: She is obese. She is not toxic-appearing.   HENT:      Head: Normocephalic.      Mouth/Throat:      Mouth: Mucous membranes are moist.   Eyes:      General: No scleral icterus.     Extraocular Movements: Extraocular movements intact.      Pupils: Pupils are equal, round, and reactive to light.   Cardiovascular:      Rate and Rhythm: Normal rate. Rhythm irregular.      Heart sounds: No murmur heard.  Pulmonary:      Effort: Pulmonary effort is normal.      Breath sounds: No wheezing, rhonchi or rales.   Abdominal:      Palpations: Abdomen is soft.      Tenderness: There is no abdominal tenderness.   Musculoskeletal:      Right lower leg: Edema present.      Left lower leg: Edema present.   Skin:     General: Skin is warm and dry.      Findings: Bruising present.            Significant Labs: EP:   Recent Labs   Lab 07/02/24 0326 07/03/24 0727 07/03/24  0809    137  --    K 4.7 5.0  --     107  --    CO2 18* 18*  --     117*  --    BUN 72* 88*  --    CREATININE 6.2* 7.0*  --    CALCIUM 8.7 9.3  --    PROT  --  6.3  --    ALBUMIN  --  2.6*  --    BILITOT  --  0.8  --    ALKPHOS  --  101  --    AST  --  15  --    ALT  --  11  --    ANIONGAP 13 12  --    WBC 10.98 9.05 10.33   HGB 11.3* 10.9* 10.6*   HCT 39.7 36.4* 35.0*    281 260   INR  --   --  1.0   , CMP:   Recent Labs   Lab 07/02/24 0326 07/03/24 0727    137   K 4.7 5.0    107   CO2 18* 18*    117*   BUN 72* 88*   CREATININE 6.2* 7.0*   CALCIUM 8.7 9.3   PROT  --  6.3   ALBUMIN  --  2.6*   BILITOT  --  0.8   ALKPHOS  --  101   AST  --  15   ALT  --  11   ANIONGAP 13 12   , CBC:   Recent Labs   Lab 07/02/24 0326 07/03/24 0727 07/03/24  0809   WBC 10.98 9.05 10.33   HGB 11.3* 10.9* 10.6*   HCT 39.7 36.4* 35.0*    281 260   , INR:   Recent Labs   Lab 07/03/24  0809   INR 1.0   , Lipid Panel No results for input(s): "CHOL", "HDL", "LDLCALC", "TRIG", " ""CHOLHDL" in the last 48 hours., and All pertinent lab results from the last 24 hours have been reviewed.    Significant Imaging: Echocardiogram: Transthoracic echo (TTE) complete (Cupid Only):   Results for orders placed or performed during the hospital encounter of 06/18/24   Echo   Result Value Ref Range    BSA 2.64 m2    Narrative      Left Ventricle: The left ventricle is normal in size. Normal wall   thickness. Severe global hypokinesis present. There is severely reduced   systolic function with a visually estimated ejection fraction of 20 - 25%.   Grade III diastolic dysfunction.    Right Ventricle: Mild right ventricular enlargement. Wall thickness is   normal. Right ventricle wall motion has global hypokinesis. Systolic   function is moderately reduced.    Left Atrium: Left atrium is moderately dilated.    Tricuspid Valve: There is moderate regurgitation with an anteromedial   eccentrically directed jet.    Overall the study quality was technically difficult. The study was   difficult due to patient's clinical status and body habitus.       "

## 2024-07-03 NOTE — Clinical Note
The right groin and left neck was prepped. The site was prepped with ChloraPrep and Betadine. The patient was draped.

## 2024-07-03 NOTE — NURSING
Pt's daughter Maria Luisa called and notified about patient being transported to [facility] main [facility], room number and telephone info provided, all questions answered.

## 2024-07-03 NOTE — ASSESSMENT & PLAN NOTE
"This patient does have evidence of infective focus  My overall impression is sepsis.  Source:  tunneled catheter - since been pulled  Antibiotics given-   Antibiotics (72h ago, onward)      Start     Stop Route Frequency Ordered    07/03/24 2100  ceFEPIme (MAXIPIME) 2 g in D5W 100 mL IVPB (MB+)         -- IV Every 24 hours (non-standard times) 07/03/24 0450    07/03/24 0615  vancomycin (VANCOCIN) 500 mg in D5W 100 mL IVPB (MB+)         -- IV Once 07/03/24 0504    07/03/24 0559  vancomycin - pharmacy to dose  (vancomycin IVPB (PEDS and ADULTS))        Placed in "And" Linked Group    -- IV pharmacy to manage frequency 07/03/24 0459          Latest lactate reviewed-  No results for input(s): "LACTATE", "POCLAC" in the last 72 hours.  Organ dysfunction indicated by Acute kidney injury    Fluid challenge Not needed - patient is not hypotensive      Post- resuscitation assessment No - Post resuscitation assessment not needed       Will Not start Pressors- Levophed for MAP of 65  Source control achieved by: pulling tunneled catheter    Follow up on blood cultures  On cefepime and vancomycin  "

## 2024-07-03 NOTE — PLAN OF CARE
Plan of care reviewed with patient. Patient is AOX4 and VS stable. Education on falls given to pt and family. Patient remained free of falls and trauma, fall precautions are in place. Patient has no complaints of pain. Patient has no questions at this time. Wheels are locked and the bed is in lowest position. The call bell is within reach. Telemetry is on.

## 2024-07-03 NOTE — ASSESSMENT & PLAN NOTE
Patient with Paroxysmal (<7 days) atrial fibrillation which is controlled currently with Beta Blocker. Patient is currently in atrial fibrillation.[id number] Score: 4. Anticoagulation indicated. Anticoagulation done with heparin .    Recommendations:  - continue on metoprolol tartrate 25mg PO BID  - goal HR < 110 bpm  - continue anticoagulation, currently on heparin  - monitor electrolytes, currently on HD  - diltiazem contraindicated in setting of reduced EF

## 2024-07-03 NOTE — CARE UPDATE
I have reviewed the chart of Juhi Taylor who is hospitalized for the following:    Active Hospital Problems    Diagnosis    *Severe sepsis    Acute on chronic combined systolic and diastolic heart failure    Atrial fibrillation with RVR    CKD stage 4 secondary to hypertension    Severe obesity (BMI >= 40)    Essential hypertension    Type 2 diabetes mellitus with microalbuminuria, without long-term current use of insulin        Jocy Campos NP  [facility]

## 2024-07-03 NOTE — H&P
"  Adalberto iris - Cardiology [facility] Medicine  History & Physical    Patient Name: Juhi Taylor  MRN: [id number]  Patient Class: IP- Inpatient  Admission Date: 7/3/2024  Attending Physician: Rush Winkler MD  Primary Care Provider: Tony Sadler MD         Patient information was obtained from  transfer hospital notes .     Subjective:     Principal Problem:Severe sepsis, ESRD       HPI: Ms. Taylor is a 75yo female with a PMHx of CAD, ESRD on PD, HFrEF of 40%, and afib on eliquis who was transferred to [facility] from [facility] for a higher level of care. She originally presented to [facility] ED on 6/18 for worsening SOB and was found to be satting 89% on RA determined to be due to a heart failure exacerbation.  She was given lasix gtt and metolazone but still produced insufficient urine. HD was started, but during a dialysis session on 6/24 she suffered PEA cardiac arrest, epi was administered and she converted to V tach. After cardioversion ROSC was achieved and she was intubated and transferred to the ICU. Repeat Echo revealed an EF of 20-25% from 40% on admission but cardiology did not intervene at this time to due to her clinical condition but recommended later evaluation for ICD and angiogram. Her condition improved with further HD. She was extubated and moved to the floor but developed an uptrending leukocytosis possibly originating from a left trialysis catheter with bruising and possible purulent discharge. The line was removed. She was determined to not be a candidate for hospice. The family requested transfer to [facility] for a higher level of care. On admission she was comfortable but minimal further history could be obtained due to patient's frustration. See  note below:       "Ms. Juhi Taylor is a 74 y.o. female with afib on Eliquis, CAD, ESRD on PD, CHF EF40%, and morbid obesity who initailly presented to the [facility] ED on 6/18 for evaluation of shortness of breath.  She had a PD catheter " inserted at the beginning of June to start PD, but because of insurance issues, her PD got delayed.  Since that time, she developed worsening SOB and weight gain.  Because her symptoms continued to worsen, she went to the ER.     Upon arrival to the ER, she was found to be satting 89% on room air.  Labs showed WBC 10, Hgb 12.5, Creatinine 4.1 and BNP 1,726.  She was started on a Lasix gtt and Nephro was consulted.  She failed to have adequate urine output despite Lasix gtt and Metolazone, so she was started on HD.  On 6/24, she got PEA cardiac arrest during dialysis.  After giving Epinephrine, the rhythm appeared to change to ventricular tachycardia.  She was cardioverted and achieved ROSC.  She was intubated and transferred to ICU on an Amiodarone gtt, which was stopped because of patient intolerance.  She was found to have a significant Troponin and Cardiology was consulted.  Echo showed a drop in EF to 20-25% from 40% prior to admission.  Cardiology decided against acute intervention because of her clinical condition, but suggested an ICD and angiogram once improved.  HD was continued and and she was able to be extubated and SD to the floor.  She was noted to have an uptrending leukocytosis. Left trailysis cathter had bruising around and possible purulent collection and the line was removed. Blood culture and tip culture have been NGTD.  She is currently on Cefepime and Vancomycin.   She then developed AFib with RVR.  She was started on Sotalol by Cardiology, which patient then refused to take once reading the ADRs.  She was then started on a Dilt gtt (with her reduced EF) and Metoprolol.  She has been seen by Palliative Care, and they are not ready for hospice.  Patient and family are not happy with the care at [facility].  They are requesting transfer to [facility] for a HLOC.  She will transfer to [facility] for EP, Cardiology, Nephrology and IR evaluations.     Consult EP for afib with RVR/ICD eval  Consult Cardiology for  "angiogram  Consult Nephro for HD  Consult IR/Interventional Nephro for HD line placement"          Past Medical History:   Diagnosis Date    Anticoagulant long-term use     Arthritis     Breast cancer 2014    invasive lobular carcinoma    Cancer of kidney 11/2020    RIGHT KIDNEY CANCER    CHF (congestive heart failure)     Coronary artery disease dx 2005    Depression     Diabetes mellitus     Diastolic heart failure secondary to hypertension     Gout     Hyperlipemia     Hypertension     Hypertrophy of nasal turbinates     Kidney mass 2020    Right    Levoscoliosis     Lung nodule     left    Multiple thyroid nodules     NICOLE (obstructive sleep apnea)     uses C-PAP    Pulmonary hypertension        Past Surgical History:   Procedure Laterality Date    AORTOGRAPHY N/A 12/04/2020    Procedure: Aortogram;  Surgeon: Paul Pedersen MD;  Location: [facility] CATH;  Service: Cardiology;  Laterality: N/A;    BREAST SURGERY      CARDIAC CATHETERIZATION  12/2020    CHOLECYSTECTOMY      COLONOSCOPY      multi -last 2014     CORONARY ARTERY BYPASS GRAFT      ESOPHAGOGASTRODUODENOSCOPY      2012     HAND SURGERY Right     INSERTION OF CATHETER N/A 6/23/2024    Procedure: Insertion,catheter;  Surgeon: Meli Griffin MD;  Location: [facility] OR;  Service: Vascular;  Laterality: N/A;  ORIGINAL CATHETER WAS REPOSITIONED.  NO NEW CATHETER WAS PLACED    INSERTION, CATHETER, DIALYSIS, PERITONEAL N/A 6/4/2024    Procedure: IN Insertion Catheter, Dialysis, Peritoneal - laparoscopic;  Surgeon: Rodriguez Catalan Jr., MD;  Location: [facility] OR;  Service: General;  Laterality: N/A;    LAPAROSCOPIC ROBOT-ASSISTED SURGICAL REMOVAL OF KIDNEY USING DA CHELLE XI Right 03/10/2022    Procedure: XI ROBOTIC NEPHRECTOMY- radical;  Surgeon: Rolando Ramirez MD;  Location: [facility] OR;  Service: Urology;  Laterality: Right;    MASTECTOMY W/ SENTINEL NODE BIOPSY Bilateral 01/21/2015    bilateral "dog ears"    NASAL SINUS SURGERY  2015    Dr Bryant " FESS/cauterization turbinate     PARTIAL HYSTERECTOMY  1989    PERCUTANEOUS TRANSLUMINAL BALLOON ANGIOPLASTY OF CORONARY ARTERY  12/04/2020    Procedure: Angioplasty-coronary;  Surgeon: Paul Pedersen MD;  [facility];  Service: Cardiology;;    RENAL BIOPSY Right     9/20/2021 EJ    TUBAL LIGATION      ULTRASOUND GUIDANCE  12/04/2020    Procedure: Ultrasound Guidance;  Surgeon: Paul Pedersen MD;  Location: [facility] CATH;  Service: Cardiology;;       Review of patient's allergies indicates:   Allergen Reactions    Percocet [oxycodone-acetaminophen] Itching    Amiodarone analogues      Itching      Irbesartan Swelling    Januvia [sitagliptin]     Jardiance [empagliflozin]      Leg cramps    Lipitor [atorvastatin] Other (See Comments)     Severe leg pain    Linaclotide Other (See Comments) and Nausea And Vomiting     Does not remember    Lubiprostone Other (See Comments) and Palpitations     Does not remember       Current Facility-Administered Medications on File Prior to Encounter   Medication    [COMPLETED] desmopressin (DDAVP) 39.692 mcg in 0.9% NaCl 50 mL IVPB    [DISCONTINUED] acetaminophen tablet 650 mg    [DISCONTINUED] aluminum-magnesium hydroxide-simethicone 200-200-20 mg/5 mL suspension 30 mL    [DISCONTINUED] cefepime 2 g in dextrose 5% 100 mL IVPB (ready to mix)    [DISCONTINUED] dextrose 50% injection 12.5 g    [DISCONTINUED] dextrose 50% injection 25 g    [DISCONTINUED] diltiaZEM 100 mg in dextrose 5% 100 mL IVPB (ready to mix) (non-titrating)    [DISCONTINUED] epoetin alaina-epbx injection 7,400 Units    [DISCONTINUED] famotidine tablet 20 mg    [DISCONTINUED] furosemide injection 40 mg    [DISCONTINUED] glucagon (human recombinant) injection 1 mg    [DISCONTINUED] glucose chewable tablet 24 g    [DISCONTINUED] guaiFENesin 100 mg/5 ml syrup 400 mg    [DISCONTINUED] heparin (porcine) injection 5,000 Units    [DISCONTINUED] hydrOXYzine HCL tablet 25 mg    [DISCONTINUED] melatonin tablet 6 mg     [DISCONTINUED] metoprolol tartrate (LOPRESSOR) tablet 25 mg    [DISCONTINUED] ondansetron injection 4 mg    [DISCONTINUED] vancomycin - pharmacy to dose     Current Outpatient Medications on File Prior to Encounter   Medication Sig    allopurinoL (ZYLOPRIM) 300 MG tablet Take 1 tablet (300 mg total) by mouth once daily.    amLODIPine (NORVASC) 10 MG tablet Take 10 mg by mouth once daily.    blood sugar diagnostic (TRUE METRIX GLUCOSE TEST STRIP) Strp Use 1x daily. Insurance preferred.    blood sugar diagnostic Strp To check BG 1 time daily, to use with insurance preferred meter    calcitRIOL (ROCALTROL) 0.5 MCG Cap Take 0.5 mcg by mouth once daily.    carvediloL (COREG) 25 MG tablet Take 1 tablet (25 mg total) by mouth 2 (two) times daily.    cloNIDine (CATAPRES) 0.1 MG tablet Take 1 tablet (0.1 mg total) by mouth 3 (three) times daily as needed (PRN SBP > 165 mmHg).    cloNIDine 0.1 mg/24 hr td ptwk (CATAPRES) 0.1 mg/24 hr Place 1 patch onto the skin every 7 days.    cyanocobalamin (VITAMIN B-12) 100 MCG tablet Take 100 mcg by mouth once daily.    ELIQUIS 5 mg Tab TAKE 1 TABLET(5 MG) BY MOUTH TWICE DAILY    evolocumab (REPATHA SURECLICK) 140 mg/mL PnIj Inject 1 mL (140 mg total) into the skin every 14 (fourteen) days.    furosemide (LASIX) 40 MG tablet Take 40 mg by mouth once daily.    lancets Misc To check BG 1 times daily, to use with insurance preferred meter    loratadine (CLARITIN) 10 mg tablet Take 10 mg by mouth once daily.    magnesium oxide (MAG-OX) 400 mg (241.3 mg magnesium) tablet Take 1 tablet (400 mg total) by mouth daily as needed (cramping).    nitroGLYCERIN (NITROSTAT) 0.4 MG SL tablet Place 1 tablet (0.4 mg total) under the tongue every 5 (five) minutes as needed for Chest pain.    [DISCONTINUED] aspirin (ECOTRIN) 81 MG EC tablet Take 1 tablet (81 mg total) by mouth once daily.    [DISCONTINUED] DULoxetine (CYMBALTA) 20 MG capsule TAKE ONE CAPSULE BY MOUTH ONCE DAILY    [DISCONTINUED] folic acid  (FOLVITE) 1 MG tablet Take 1 mg by mouth once daily.    [DISCONTINUED] metFORMIN (GLUCOPHAGE-XR) 500 MG ER 24hr tablet Take 2 tablets (1,000 mg total) by mouth 2 (two) times daily with meals.    [DISCONTINUED] sodium bicarbonate 650 MG tablet Take 1 tablet (650 mg total) by mouth once daily.     Family History       Problem Relation (Age of Onset)    Asthma Daughter    Birth defects Daughter    Breast cancer Mother, Maternal Aunt    Depression Daughter    Drug abuse Daughter, Daughter    Glaucoma Sister    Hepatitis Brother    Hypertension Father    Learning disabilities Daughter    Mental illness Daughter    Stroke Father          Tobacco Use    Smoking status: Former     Current packs/day: 0.00     Types: Cigarettes     Start date: 2016     Quit date: 2016     Years since quittin.5    Smokeless tobacco: Never    Tobacco comments:     quit    Substance and Sexual Activity    Alcohol use: No    Drug use: No    Sexual activity: Not Currently     Partners: Male     Birth control/protection: None     Review of Systems   Constitutional:         Unable to obtain secondary to patient frustration with situation     Objective:     Vital Signs (Most Recent):  Temp: 97.6 °F (36.4 °C) (24)  Pulse: 102 (24)  Resp: 20 (24)  BP: 122/89 (24)  SpO2: (!) 94 % (24) Vital Signs (24h Range):  Temp:  [97.6 °F (36.4 °C)-98.6 °F (37 °C)] 97.6 °F (36.4 °C)  Pulse:  [] 102  Resp:  [18-35] 20  SpO2:  [88 %-100 %] 94 %  BP: ()/(55-98) 122/89     Weight: (!) 137 kg (302 lb 0.5 oz)  Body mass index is 47.3 kg/m².     Physical Exam  Constitutional:       General: She is not in acute distress.     Appearance: Normal appearance. She is obese.   HENT:      Head: Normocephalic and atraumatic.      Mouth/Throat:      Mouth: Mucous membranes are moist.      Pharynx: Oropharynx is clear.   Cardiovascular:      Rate and Rhythm: Normal rate and regular rhythm.       "Heart sounds: Normal heart sounds.   Pulmonary:      Effort: Pulmonary effort is normal.      Breath sounds: Normal breath sounds.   Abdominal:      Palpations: Abdomen is soft.   Musculoskeletal:      Right lower leg: Edema (2+) present.      Left lower leg: Edema (2+) present.   Skin:     General: Skin is warm and dry.   Neurological:      General: No focal deficit present.      Mental Status: She is alert and oriented to person, place, and time.   Psychiatric:      Comments: agitated                Significant Labs: All pertinent labs within the past 24 hours have been reviewed.  CBC:   Recent Labs   Lab 07/02/24  0326   WBC 10.98   HGB 11.3*   HCT 39.7        CMP:   Recent Labs   Lab 07/02/24  0326      K 4.7      CO2 18*      BUN 72*   CREATININE 6.2*   CALCIUM 8.7   ANIONGAP 13       Significant Imaging: I have reviewed all pertinent imaging results/findings within the past 24 hours.  Assessment/Plan:     * Severe sepsis  This patient does have evidence of infective focus  My overall impression is sepsis.  Source:  tunneled catheter - since been pulled  Antibiotics given-   Antibiotics (72h ago, onward)      Start     Stop Route Frequency Ordered    07/03/24 2100  ceFEPIme (MAXIPIME) 2 g in D5W 100 mL IVPB (MB+)         -- IV Every 24 hours (non-standard times) 07/03/24 0450    07/03/24 0615  vancomycin (VANCOCIN) 500 mg in D5W 100 mL IVPB (MB+)         -- IV Once 07/03/24 0504    07/03/24 0559  vancomycin - pharmacy to dose  (vancomycin IVPB (PEDS and ADULTS))        Placed in "And" Linked Group    -- IV pharmacy to manage frequency 07/03/24 0459          Latest lactate reviewed-  No results for input(s): "LACTATE", "POCLAC" in the last 72 hours.  Organ dysfunction indicated by Acute kidney injury    Fluid challenge Not needed - patient is not hypotensive      Post- resuscitation assessment No - Post resuscitation assessment not needed       Will Not start Pressors- Levophed for MAP " of 65  Source control achieved by: pulling tunneled catheter    Follow up on blood cultures  On cefepime and vancomycin    Atrial fibrillation with RVR  Patient with Paroxysmal (<7 days) atrial fibrillation which is controlled currently with  diltiazem . Patient is currently in sinus rhythm.[id number] Score: 4. Pt. Has refused eliquis     Consult cards, appreciate recs    Acute on chronic heart failure  EF 25% at [facility]. Amiodarone attempted and held for bradycardia and possible allergy.    Consult cards, appreciate recs          CKD stage 4 secondary to hypertension  Tunneled line removed due to possible infection.  BMP reviewed- noted Estimated Creatinine Clearance: 11.9 mL/min (A) (based on SCr of 6.2 mg/dL (H)). according to latest data. Based on current GFR, CKD stage is stage 5 - GFR < 15.  Monitor UOP and serial BMP and adjust therapy as needed. Renally dose meds. Avoid nephrotoxic medications and procedures.    -consult nephrology for further dialysis recomendations    Type 2 diabetes mellitus with microalbuminuria, without long-term current use of insulin  Last HgbA1c 6.8. Hold hypoglycemic medications while admitted.    Q4h glucose checks  Consider SSI as needed      Essential hypertension  Chronic, controlled. Latest blood pressure and vitals reviewed-     Temp:  [97.6 °F (36.4 °C)-98.6 °F (37 °C)]   Pulse:  []   Resp:  [19-35]   BP: ()/(55-97)   SpO2:  [88 %-100 %] .   Home meds for hypertension were reviewed and noted below.   Hypertension Medications               amLODIPine (NORVASC) 10 MG tablet Take 10 mg by mouth once daily.    carvediloL (COREG) 25 MG tablet Take 1 tablet (25 mg total) by mouth 2 (two) times daily.    cloNIDine (CATAPRES) 0.1 MG tablet Take 1 tablet (0.1 mg total) by mouth 3 (three) times daily as needed (PRN SBP > 165 mmHg).    cloNIDine 0.1 mg/24 hr td ptwk (CATAPRES) 0.1 mg/24 hr Place 1 patch onto the skin every 7 days.    furosemide (LASIX) 40 MG tablet Take 40 mg by  mouth once daily.    nitroGLYCERIN (NITROSTAT) 0.4 MG SL tablet Place 1 tablet (0.4 mg total) under the tongue every 5 (five) minutes as needed for Chest pain.            While in the hospital, will manage blood pressure as follows; Adjust home antihypertensive regimen as follows- only metoprolol tartrate for now. Observe and resume home meds as needed.    Will utilize p.r.n. blood pressure medication only if patient's blood pressure greater than 180/110 and she develops symptoms such as worsening chest pain or shortness of breath.      VTE Risk Mitigation (From admission, onward)           Ordered     heparin 25,000 units in dextrose 5% (100 units/ml) IV bolus from bag LOW INTENSITY nomogram - [facility]  As needed (PRN)        Question:  Heparin Infusion Adjustment (DO NOT MODIFY ANSWER)  Answer:  \\[email or web address]\epic\Images\Pharmacy\HeparinInfusions\heparin LOW INTENSITY nomogram for [facility] [id number]    07/03/24 0727     heparin 25,000 units in dextrose 5% (100 units/ml) IV bolus from bag LOW INTENSITY nomogram - [facility]  As needed (PRN)        Question:  Heparin Infusion Adjustment (DO NOT MODIFY ANSWER)  Answer:  \\[email or web address]\epic\Images\Pharmacy\HeparinInfusions\heparin LOW INTENSITY nomogram for [facility] [id number]    07/03/24 0727     heparin 25,000 units in dextrose 5% (100 units/ml) IV bolus from bag LOW INTENSITY nomogram - [facility]  Once        Question:  Heparin Infusion Adjustment (DO NOT MODIFY ANSWER)  Answer:  \[email or web address]\epic\Images\Pharmacy\HeparinInfusions\heparin LOW INTENSITY nomogram for [facility] [id number]    07/03/24 0727     heparin 25,000 units in dextrose 5% 250 mL (100 units/mL) infusion LOW INTENSITY nomogram - [facility]  Continuous        Question:  Begin at (units/kg/hr)  Answer:  12    07/03/24 0727     Place sequential compression device  Until discontinued         07/03/24 0450                               Pharmacokinetic Initial Assessment: IV Vancomycin    Assessment/Plan:    Patient from [facility] on vancomycin  regimen.  Will continue to pulse dose with 500mg once and next vancomycin level to be drawn on 7/4 @ 0500 (w/ AM labs)  Desired empiric serum trough concentration is 15 to 20 mcg/mL    Pharmacy will continue to follow and monitor vancomycin.      Please contact pharmacy at extension [phone] with any questions regarding this assessment.     Thank you for the consult,   Lou Wang       Patient brief summary:  Juhi Taylor is a 74 y.o. female initiated on antimicrobial therapy with IV Vancomycin for treatment of suspected bacteremia    Drug Allergies:   Review of patient's allergies indicates:   Allergen Reactions    Percocet [oxycodone-acetaminophen] Itching    Amiodarone analogues      Itching      Irbesartan Swelling    Januvia [sitagliptin]     Jardiance [empagliflozin]      Leg cramps    Lipitor [atorvastatin] Other (See Comments)     Severe leg pain    Linaclotide Other (See Comments) and Nausea And Vomiting     Does not remember    Lubiprostone Other (See Comments) and Palpitations     Does not remember       Actual Body Weight:   137kg    Renal Function:   Estimated Creatinine Clearance: 11.9 mL/min (A) (based on SCr of 6.2 mg/dL (H)).,     Dialysis Method (if applicable):  N/A    CBC (last 72 hours):  Recent Labs   Lab Result Units 07/01/24  0405 07/02/24  0326   WBC K/uL 12.89* 10.98   Hemoglobin g/dL 10.4* 11.3*   Hematocrit % 36.1* 39.7   Platelets K/uL 196 151   Gran % % 67.3 64.9   Lymph % % 16.9* 18.3   Mono % % 13.3 13.8   Eosinophil % % 1.9 1.9   Basophil % % 0.3 0.6   Differential Method  Automated Automated       Metabolic Panel (last 72 hours):  Recent Labs   Lab Result Units 07/01/24  0405 07/02/24  0326   Sodium mmol/L 137 136   Potassium mmol/L 4.4 4.7   Chloride mmol/L 105 105   CO2 mmol/L 20* 18*   Glucose mg/dL 114* 108   BUN mg/dL 57* 72*   Creatinine mg/dL 5.6* 6.2*   Magnesium mg/dL 2.4 2.4       Drug levels (last 3 results):  Recent Labs   Lab Result Units 07/01/24  0405    Vancomycin, Random ug/mL 11.4       Microbiologic Results:  Microbiology Results (last 7 days)       ** No results found for the last 168 hours. **              Tiff Almanza MD  Department of Hospital Medicine  [facility] - Cardiology Stepdown

## 2024-07-03 NOTE — H&P (VIEW-ONLY)
[facility] - [facility] Stepdown  Nephrology  Consult Note    Patient Name: Juhi Taylor  MRN: [id number]  Admission Date: 7/3/2024  Hospital Length of Stay: 0 days  Attending Provider: Natanael Munoz MD   Primary Care Physician: Tony Sadler MD  Principal Problem:Severe sepsis    Inpatient consult to Nephrology  Consult performed by: Yonathan Souza DO  Consult ordered by: Tiff Almanza MD        Subjective:     HPI: 74-year-old female with prior history of CKD stage 5 with recent PD catheter placement on 6/4, RCC s/p right nephrectomy, T2DM, obesity, CAD s/p PCI to Lcx and LAD in 11/2020 and HFmREF who is admitted as a transfer from [facility] for higher level of care and further cardiac evaluation. Nephrology consulted for hemodialysis.     She initially presented on 6/18/24 to [facility] for hypervolemia in the setting of CKD 4-5 for which she had undergone elective PD catheter placement complicated by catheter site leaking. She had a complex hospital course, was initially placed on furosemide gtt without significant improvement and later underwent placement of tunneled line for HD with volume removal. During her second dialysis session she went into PEA arrest for which she was resuscitated, intubated and transferred to ICU. She was noted to have elevated troponin and subsequent TTE found reduced EF from 40-45% to 20-25% for which coronary angiography was recommended. Her hospital course was also complicated by dialysis line infection, significant bleeding around the catheter site, bradycardia and subsequent atrial fibrillation with RVR. Given the catheter site bleeding, anticoagulation was temporarily held. She has reported allergy to amiodarone, was initially placed on diltiazem for atrial fibrillation despite reduced EF. She discussed GOC at outside hospital where it appears that she lacked capacity per note and decided to pursue higher level of care at [facility]. At that time, gen surgery was planning to  place a left tunneled catheter due to concerns of the right side not being suitable    Upon admit, patient appeared drowsy but conversant. Daughter reports that she typically gets lethargic 2/2 to the volume overload due to not receiving dialysis. Patient reported b/l left lower extremity pain. Daughter concerned about her not having dialysis in 4 days and wants a session today.     Past Medical History:   Diagnosis Date    Anticoagulant long-term use     Arthritis     Breast cancer 2014    invasive lobular carcinoma    Cancer of kidney 11/2020    RIGHT KIDNEY CANCER    CHF (congestive heart failure)     Coronary artery disease dx 2005    Depression     Diabetes mellitus     Diastolic heart failure secondary to hypertension     Gout     Hyperlipemia     Hypertension     Hypertrophy of nasal turbinates     Kidney mass 2020    Right    Levoscoliosis     Lung nodule     left    Multiple thyroid nodules     NICOLE (obstructive sleep apnea)     uses C-PAP    Pulmonary hypertension        Past Surgical History:   Procedure Laterality Date    AORTOGRAPHY N/A 12/04/2020    Procedure: Aortogram;  Surgeon: Paul Pedersen MD;  Location: [facility] CATH;  Service: Cardiology;  Laterality: N/A;    BREAST SURGERY      CARDIAC CATHETERIZATION  12/2020    CHOLECYSTECTOMY      COLONOSCOPY      multi -last 2014     CORONARY ARTERY BYPASS GRAFT      ESOPHAGOGASTRODUODENOSCOPY      2012     HAND SURGERY Right     INSERTION OF CATHETER N/A 6/23/2024    Procedure: Insertion,catheter;  Surgeon: Meli Griffin MD;  Location: [facility] OR;  Service: Vascular;  Laterality: N/A;  ORIGINAL CATHETER WAS REPOSITIONED.  NO NEW CATHETER WAS PLACED    INSERTION, CATHETER, DIALYSIS, PERITONEAL N/A 6/4/2024    Procedure: IN Insertion Catheter, Dialysis, Peritoneal - laparoscopic;  Surgeon: Rodriguez Catalan Jr., MD;  Location: [facility] OR;  Service: General;  Laterality: N/A;    LAPAROSCOPIC ROBOT-ASSISTED SURGICAL REMOVAL OF KIDNEY USING DA CHELLE XI Right  "03/10/2022    Procedure: XI ROBOTIC NEPHRECTOMY- radical;  Surgeon: Rolando Ramirez MD;  Location: [facility] OR;  Service: Urology;  Laterality: Right;    MASTECTOMY W/ SENTINEL NODE BIOPSY Bilateral 01/21/2015    bilateral "dog ears"    NASAL SINUS SURGERY  2015    Dr Bryant FESS/cauterization turbinate     PARTIAL HYSTERECTOMY  1989    PERCUTANEOUS TRANSLUMINAL BALLOON ANGIOPLASTY OF CORONARY ARTERY  12/04/2020    Procedure: Angioplasty-coronary;  Surgeon: Paul Pedersen MD;  Location: [facility] CATH;  Service: Cardiology;;    RENAL BIOPSY Right     9/20/2021 EJ    TUBAL LIGATION      ULTRASOUND GUIDANCE  12/04/2020    Procedure: Ultrasound Guidance;  Surgeon: Paul Pedersen MD;  [facility];  Service: Cardiology;;       Review of patient's allergies indicates:   Allergen Reactions    Percocet [oxycodone-acetaminophen] Itching    Amiodarone analogues      Itching      Irbesartan Swelling    Januvia [sitagliptin]     Jardiance [empagliflozin]      Leg cramps    Lipitor [atorvastatin] Other (See Comments)     Severe leg pain    Linaclotide Other (See Comments) and Nausea And Vomiting     Does not remember    Lubiprostone Other (See Comments) and Palpitations     Does not remember     Current Facility-Administered Medications   Medication Frequency    ceFEPIme (MAXIPIME) 1 g in D5W 100 mL IVPB (MB+) Q24H    dextromethorphan-guaiFENesin  mg/5 ml liquid 5 mL Q4H PRN    famotidine tablet 20 mg Daily    furosemide injection 40 mg Q12H    gentamicin 0.1 % ointment TID    heparin 25,000 units in dextrose 5% (100 units/ml) IV bolus from bag LOW INTENSITY nomogram - [facility] PRN    heparin 25,000 units in dextrose 5% (100 units/ml) IV bolus from bag LOW INTENSITY nomogram - [facility] PRN    heparin 25,000 units in dextrose 5% 250 mL (100 units/mL) infusion LOW INTENSITY nomogram - [facility] Continuous    metoprolol tartrate (LOPRESSOR) tablet 25 mg BID    vancomycin (VANCOCIN) 500 mg in D5W 100 mL IVPB (MB+) Once    vancomycin - " pharmacy to dose pharmacy to manage frequency     Family History       Problem Relation (Age of Onset)    Asthma Daughter    Birth defects Daughter    Breast cancer Mother, Maternal Aunt    Depression Daughter    Drug abuse Daughter, Daughter    Glaucoma Sister    Hepatitis Brother    Hypertension Father    Learning disabilities Daughter    Mental illness Daughter    Stroke Father          Tobacco Use    Smoking status: Former     Current packs/day: 0.00     Types: Cigarettes     Start date: 2016     Quit date: 2016     Years since quittin.5    Smokeless tobacco: Never    Tobacco comments:     quit    Substance and Sexual Activity    Alcohol use: No    Drug use: No    Sexual activity: Not Currently     Partners: Male     Birth control/protection: None     Review of Systems   Cardiovascular:  Positive for leg swelling.   Musculoskeletal:  Positive for arthralgias.   All other systems reviewed and are negative.    Objective:     Vital Signs (Most Recent):  Temp: 98.1 °F (36.7 °C) (24 1201)  Pulse: 75 (24 1201)  Resp: 20 (24 1201)  BP: 130/89 (24 1201)  SpO2: (!) 91 % (24 1201) Vital Signs (24h Range):  Temp:  [97.6 °F (36.4 °C)-98.2 °F (36.8 °C)] 98.1 °F (36.7 °C)  Pulse:  [] 75  Resp:  [19-35] 20  SpO2:  [88 %-100 %] 91 %  BP: ()/(55-95) 130/89     Weight: (!) 137 kg (302 lb 0.5 oz) (24 0439)  Body mass index is 47.3 kg/m².  Body surface area is 2.54 meters squared.    No intake/output data recorded.     Physical Exam  Constitutional:       General: She is not in acute distress.     Appearance: Normal appearance. She is obese.   HENT:      Head: Normocephalic and atraumatic.      Mouth/Throat:      Mouth: Mucous membranes are moist.      Pharynx: Oropharynx is clear.   Cardiovascular:      Rate and Rhythm: Normal rate and regular rhythm.      Heart sounds: Normal heart sounds.   Pulmonary:      Effort: Pulmonary effort is normal.      Breath  sounds: Normal breath sounds.   Abdominal:      Palpations: Abdomen is soft.   Musculoskeletal:      Right lower leg: Edema (2+) present.      Left lower leg: Edema (2+) present.   Skin:     General: Skin is warm and dry.   Neurological:      General: No focal deficit present.      Mental Status: She is alert and oriented to person, place, and time.          Significant Labs:  All labs within the past 24 hours have been reviewed.    Significant Imaging:  All imaging within the past 24 hours have been reviewed.  Assessment/Plan:     Renal/  CKD stage 4 secondary to hypertension  74-year-old female with DMT2, ESRD on HD, pAF, CAD s/p PCI and HFrEF is admitted as a transfer for acute HFrEF in setting of CKD5 and recent PEA arrest during dialysis. Recent PD catheter placement on 6/4 but concerned for a possibly leak from cath. Unable to initiate dialysis for 2 weeks PTA to OSH due to insurance issues. Hospital course complicated by infected tunneled cath removed on 6/30. Transferred here for higher level of care. Initial plan at OSH was to replace tunneled catheter on the left side due to concerns to that right side may not by suitable.     Plan:  - will trial a session today through PD catheter to assess functionality; if tolerated, will plan for nightly PD  - peritoneal fluid studies and cx to r/o peritonitis   - Trend sCr  - Trend RFP; replete electrolytes PRN for goal K > 4, Phos > 3, Mg > 2  - Strict I&Os  - Avoid nephrotoxic agents  - Renally adjust medications        Thank you for your consult. I will follow-up with patient. Please contact us if you have any additional questions.    Yonathan Souza DO  Nephrology  [facility] - Cardiology Stepdown

## 2024-07-03 NOTE — CONSULTS
[facility] - Cardiology Stepdown  Cardiac Electrophysiology  Consult Note    Admission Date: 7/3/2024  Code Status: Full Code   Attending Provider: Natanael Munoz MD  Consulting Provider: August Neri MD  Principal Problem:Severe sepsis    Consults  Subjective:     Chief Complaint:  SOB     HPI:   73F w/ ESRD, HFrEF (LVEF 25%, no ICD yet), CAD s/p PCI (oLCX, mLCx, and pLAD 2020), paroxysmal Atrial fibrillation, hx RCC s/p R nephrectomy, T2DM obesity, and initial outside hospitalization for PD catheter malfunction and ADHF iso of volume overload complicated by PEA arrest with conversion to VT after epi and cardioversion post ROSC and recurrent AF w/ RVR.    [place] now [place] [facility] [place]. Remains in rate controlled atrial fibrillation on Diltizem gtt which primary team is discontinuing and transitioning to oral metoprolol tartrate. Per OSH documentation develops a rash with amiodarone, refused sotalol, and was not controlled on po metoprolol. Patient has been off of anticoagulation iso of low platelet counts on heparin (no HIT assay sent and  but is now ordered for heparin gtt. Patient refused ischemic evaluation at outside hospital, but cardiology here now consulted. LVEF in early June was 40-45%, post arrest TTE showed LVEF 20-25%.    This morning patient reports fatigue but is agreeable to trial on metoprolol po and IV heparin gtt. At this time does not have access for iHD    Past Medical History:   Diagnosis Date    Anticoagulant long-term use     Arthritis     Breast cancer 2014    invasive lobular carcinoma    Cancer of kidney 11/2020    RIGHT KIDNEY CANCER    CHF (congestive heart failure)     Coronary artery disease dx 2005    Depression     Diabetes mellitus     Diastolic heart failure secondary to hypertension     Gout     Hyperlipemia     Hypertension     Hypertrophy of nasal turbinates     Kidney mass 2020    Right    Levoscoliosis     Lung nodule     left    Multiple thyroid  "nodules     NICOLE (obstructive sleep apnea)     uses C-PAP    Pulmonary hypertension        Past Surgical History:   Procedure Laterality Date    AORTOGRAPHY N/A 12/04/2020    Procedure: Aortogram;  Surgeon: Paul Pedersen MD;  Location: [facility] CATH;  Service: Cardiology;  Laterality: N/A;    BREAST SURGERY      CARDIAC CATHETERIZATION  12/2020    CHOLECYSTECTOMY      COLONOSCOPY      multi -last 2014     CORONARY ARTERY BYPASS GRAFT      ESOPHAGOGASTRODUODENOSCOPY      2012     HAND SURGERY Right     INSERTION OF CATHETER N/A 6/23/2024    Procedure: Insertion,catheter;  Surgeon: Meli Griffin MD;  Location: [facility] OR;  Service: Vascular;  Laterality: N/A;  ORIGINAL CATHETER WAS REPOSITIONED.  NO NEW CATHETER WAS PLACED    INSERTION, CATHETER, DIALYSIS, PERITONEAL N/A 6/4/2024    Procedure: IN Insertion Catheter, Dialysis, Peritoneal - laparoscopic;  Surgeon: Rodriguez Catalan Jr., MD;  Location: [facility] OR;  Service: General;  Laterality: N/A;    LAPAROSCOPIC ROBOT-ASSISTED SURGICAL REMOVAL OF KIDNEY USING DA CHELLE XI Right 03/10/2022    Procedure: XI ROBOTIC NEPHRECTOMY- radical;  Surgeon: Rolando Ramirez MD;  Location: [facility] OR;  Service: Urology;  Laterality: Right;    MASTECTOMY W/ SENTINEL NODE BIOPSY Bilateral 01/21/2015    bilateral "dog ears"    NASAL SINUS SURGERY  2015    Dr Bryant FESS/cauterization turbinate     PARTIAL HYSTERECTOMY  1989    PERCUTANEOUS TRANSLUMINAL BALLOON ANGIOPLASTY OF CORONARY ARTERY  12/04/2020    Procedure: Angioplasty-coronary;  Surgeon: Paul Pedersen MD;  Location: [facility] CATH;  Service: Cardiology;;    RENAL BIOPSY Right     9/20/2021 EJ    TUBAL LIGATION      ULTRASOUND GUIDANCE  12/04/2020    Procedure: Ultrasound Guidance;  Surgeon: Paul Pedersen MD;  Location: [facility] CATH;  Service: Cardiology;;       Review of patient's allergies indicates:   Allergen Reactions    Percocet [oxycodone-acetaminophen] Itching    Amiodarone analogues      Itching      Irbesartan Swelling    " Januvia [sitagliptin]     Jardiance [empagliflozin]      Leg cramps    Lipitor [atorvastatin] Other (See Comments)     Severe leg pain    Linaclotide Other (See Comments) and Nausea And Vomiting     Does not remember    Lubiprostone Other (See Comments) and Palpitations     Does not remember       Current Facility-Administered Medications on File Prior to Encounter   Medication    [COMPLETED] desmopressin (DDAVP) 39.692 mcg in 0.9% NaCl 50 mL IVPB    [DISCONTINUED] acetaminophen tablet 650 mg    [DISCONTINUED] aluminum-magnesium hydroxide-simethicone 200-200-20 mg/5 mL suspension 30 mL    [DISCONTINUED] cefepime 2 g in dextrose 5% 100 mL IVPB (ready to mix)    [DISCONTINUED] dextrose 50% injection 12.5 g    [DISCONTINUED] dextrose 50% injection 25 g    [DISCONTINUED] diltiaZEM 100 mg in dextrose 5% 100 mL IVPB (ready to mix) (non-titrating)    [DISCONTINUED] epoetin alaina-epbx injection 7,400 Units    [DISCONTINUED] famotidine tablet 20 mg    [DISCONTINUED] furosemide injection 40 mg    [DISCONTINUED] glucagon (human recombinant) injection 1 mg    [DISCONTINUED] glucose chewable tablet 24 g    [DISCONTINUED] guaiFENesin 100 mg/5 ml syrup 400 mg    [DISCONTINUED] heparin (porcine) injection 5,000 Units    [DISCONTINUED] hydrOXYzine HCL tablet 25 mg    [DISCONTINUED] melatonin tablet 6 mg    [DISCONTINUED] metoprolol tartrate (LOPRESSOR) tablet 25 mg    [DISCONTINUED] ondansetron injection 4 mg    [DISCONTINUED] vancomycin - pharmacy to dose     Current Outpatient Medications on File Prior to Encounter   Medication Sig    allopurinoL (ZYLOPRIM) 300 MG tablet Take 1 tablet (300 mg total) by mouth once daily.    amLODIPine (NORVASC) 10 MG tablet Take 10 mg by mouth once daily.    blood sugar diagnostic (TRUE METRIX GLUCOSE TEST STRIP) Strp Use 1x daily. Insurance preferred.    blood sugar diagnostic Strp To check BG 1 time daily, to use with insurance preferred meter    calcitRIOL (ROCALTROL) 0.5 MCG Cap Take 0.5 mcg by  mouth once daily.    carvediloL (COREG) 25 MG tablet Take 1 tablet (25 mg total) by mouth 2 (two) times daily.    cloNIDine (CATAPRES) 0.1 MG tablet Take 1 tablet (0.1 mg total) by mouth 3 (three) times daily as needed (PRN SBP > 165 mmHg).    cloNIDine 0.1 mg/24 hr td ptwk (CATAPRES) 0.1 mg/24 hr Place 1 patch onto the skin every 7 days.    cyanocobalamin (VITAMIN B-12) 100 MCG tablet Take 100 mcg by mouth once daily.    ELIQUIS 5 mg Tab TAKE 1 TABLET(5 MG) BY MOUTH TWICE DAILY    evolocumab (REPATHA SURECLICK) 140 mg/mL PnIj Inject 1 mL (140 mg total) into the skin every 14 (fourteen) days.    furosemide (LASIX) 40 MG tablet Take 40 mg by mouth once daily.    lancets Misc To check BG 1 times daily, to use with insurance preferred meter    loratadine (CLARITIN) 10 mg tablet Take 10 mg by mouth once daily.    magnesium oxide (MAG-OX) 400 mg (241.3 mg magnesium) tablet Take 1 tablet (400 mg total) by mouth daily as needed (cramping).    nitroGLYCERIN (NITROSTAT) 0.4 MG SL tablet Place 1 tablet (0.4 mg total) under the tongue every 5 (five) minutes as needed for Chest pain.    [DISCONTINUED] aspirin (ECOTRIN) 81 MG EC tablet Take 1 tablet (81 mg total) by mouth once daily.    [DISCONTINUED] DULoxetine (CYMBALTA) 20 MG capsule TAKE ONE CAPSULE BY MOUTH ONCE DAILY    [DISCONTINUED] folic acid (FOLVITE) 1 MG tablet Take 1 mg by mouth once daily.    [DISCONTINUED] metFORMIN (GLUCOPHAGE-XR) 500 MG ER 24hr tablet Take 2 tablets (1,000 mg total) by mouth 2 (two) times daily with meals.    [DISCONTINUED] sodium bicarbonate 650 MG tablet Take 1 tablet (650 mg total) by mouth once daily.     Family History       Problem Relation (Age of Onset)    Asthma Daughter    Birth defects Daughter    Breast cancer Mother, Maternal Aunt    Depression Daughter    Drug abuse Daughter, Daughter    Glaucoma Sister    Hepatitis Brother    Hypertension Father    Learning disabilities Daughter    Mental illness Daughter    Stroke Father           Tobacco Use    Smoking status: Former     Current packs/day: 0.00     Types: Cigarettes     Start date: 2016     Quit date: 2016     Years since quittin.5    Smokeless tobacco: Never    Tobacco comments:     quit    Substance and Sexual Activity    Alcohol use: No    Drug use: No    Sexual activity: Not Currently     Partners: Male     Birth control/protection: None     Review of Systems   Constitutional: Negative for chills and fever.   HENT: Negative.     Cardiovascular:  Positive for dyspnea on exertion, irregular heartbeat and palpitations. Negative for chest pain, near-syncope and syncope.   Respiratory:  Positive for shortness of breath. Negative for cough, sputum production and wheezing.    Gastrointestinal:  Negative for abdominal pain, diarrhea, nausea and vomiting.   Neurological: Negative.    Psychiatric/Behavioral: Negative.     Allergic/Immunologic: Negative.      Objective:     Vital Signs (Most Recent):  Temp: 98.2 °F (36.8 °C) (24)  Pulse: 68 (24)  Resp: 20 (24)  BP: 120/72 (24)  SpO2: 100 % (24) Vital Signs (24h Range):  Temp:  [97.6 °F (36.4 °C)-98.6 °F (37 °C)] 98.2 °F (36.8 °C)  Pulse:  [] 68  Resp:  [19-35] 20  SpO2:  [88 %-100 %] 100 %  BP: ()/(55-97) 120/72       Weight: (!) 137 kg (302 lb 0.5 oz)  Body mass index is 47.3 kg/m².    SpO2: 100 %        Physical Exam  Constitutional:       General: She is not in acute distress.     Appearance: She is obese. She is not toxic-appearing.   HENT:      Head: Normocephalic.      Mouth/Throat:      Mouth: Mucous membranes are moist.   Eyes:      General: No scleral icterus.     Extraocular Movements: Extraocular movements intact.      Pupils: Pupils are equal, round, and reactive to light.   Cardiovascular:      Rate and Rhythm: Normal rate. Rhythm irregular.      Heart sounds: No murmur heard.  Pulmonary:      Effort: Pulmonary effort is normal.      Breath  "sounds: No wheezing, rhonchi or rales.   Abdominal:      Palpations: Abdomen is soft.      Tenderness: There is no abdominal tenderness.   Musculoskeletal:      Right lower leg: Edema present.      Left lower leg: Edema present.   Skin:     General: Skin is warm and dry.      Findings: Bruising present.            Significant Labs: EP:   Recent Labs   Lab 07/02/24 0326 07/03/24  0727 07/03/24  0809    137  --    K 4.7 5.0  --     107  --    CO2 18* 18*  --     117*  --    BUN 72* 88*  --    CREATININE 6.2* 7.0*  --    CALCIUM 8.7 9.3  --    PROT  --  6.3  --    ALBUMIN  --  2.6*  --    BILITOT  --  0.8  --    ALKPHOS  --  101  --    AST  --  15  --    ALT  --  11  --    ANIONGAP 13 12  --    WBC 10.98 9.05 10.33   HGB 11.3* 10.9* 10.6*   HCT 39.7 36.4* 35.0*    281 260   INR  --   --  1.0   , CMP:   Recent Labs   Lab 07/02/24 0326 07/03/24  0727    137   K 4.7 5.0    107   CO2 18* 18*    117*   BUN 72* 88*   CREATININE 6.2* 7.0*   CALCIUM 8.7 9.3   PROT  --  6.3   ALBUMIN  --  2.6*   BILITOT  --  0.8   ALKPHOS  --  101   AST  --  15   ALT  --  11   ANIONGAP 13 12   , CBC:   Recent Labs   Lab 07/02/24 0326 07/03/24  0727 07/03/24  0809   WBC 10.98 9.05 10.33   HGB 11.3* 10.9* 10.6*   HCT 39.7 36.4* 35.0*    281 260   , INR:   Recent Labs   Lab 07/03/24  0809   INR 1.0   , Lipid Panel No results for input(s): "CHOL", "HDL", "LDLCALC", "TRIG", "CHOLHDL" in the last 48 hours., and All pertinent lab results from the last 24 hours have been reviewed.    Significant Imaging: Echocardiogram: Transthoracic echo (TTE) complete (Cupid Only):   Results for orders placed or performed during the hospital encounter of 06/18/24   Echo   Result Value Ref Range    BSA 2.64 m2    Narrative      Left Ventricle: The left ventricle is normal in size. Normal wall   thickness. Severe global hypokinesis present. There is severely reduced   systolic function with a visually estimated " ejection fraction of 20 - 25%.   Grade III diastolic dysfunction.    Right Ventricle: Mild right ventricular enlargement. Wall thickness is   normal. Right ventricle wall motion has global hypokinesis. Systolic   function is moderately reduced.    Left Atrium: Left atrium is moderately dilated.    Tricuspid Valve: There is moderate regurgitation with an anteromedial   eccentrically directed jet.    Overall the study quality was technically difficult. The study was   difficult due to patient's clinical status and body habitus.          CHADS2 for A-FIB Stroke Risk  Age?: 65-74 years old  CHF history: Yes  HTN history: Yes  Previous Stroke Sx or TIA: No  Vascular Disease History?: Yes  Diabetes Mellitus History: Yes  Female?: Yes  [id number] Total Score: 6      Assessment and Plan:     Atrial fibrillation with RVR  73F w/ ESRD, HFrEF (LVEF 25%, no ICD yet), CAD s/p PCI (oLCX, mLCx, and pLAD 2020), paroxysmal Atrial fibrillation, hx RCC s/p R nephrectomy, T2DM obesity, and initial outside hospitalization for PD catheter malfunction and ADHF iso of volume overload complicated by PEA arrest with conversion to VT after epi and cardioversion => ROSC and recurrent AF w/ RVR.    Recommendations:  [ ] agree with metoprolol tartrate for rate control  [ ] anticoagulation (currently on heparin gtt)   [ ] if still in RVR and therapeutically anticoagulated after volume optimization/dialysis can consider MILLY/DCCV  [ ] Decision re: CRT-D pending ischemic evaluation, re-evaluation of LV function when optimized.        Thank you for your consult.  We will continue to follow    August Neri MD  Cardiac Electrophysiology  [facility] - Cardiology Stepdown

## 2024-07-03 NOTE — ASSESSMENT & PLAN NOTE
Tunneled line removed due to possible infection.  BMP reviewed- noted Estimated Creatinine Clearance: 11.9 mL/min (A) (based on SCr of 6.2 mg/dL (H)). according to latest data. Based on current GFR, CKD stage is stage 5 - GFR < 15.  Monitor UOP and serial BMP and adjust therapy as needed. Renally dose meds. Avoid nephrotoxic medications and procedures.    -consult nephrology for further dialysis recomendations

## 2024-07-03 NOTE — HPI
73F w/ ESRD, HFrEF (LVEF 25%, no ICD yet), CAD s/p PCI (oLCX, mLCx, and pLAD 2020), paroxysmal Atrial fibrillation, hx RCC s/p R nephrectomy, T2DM obesity, and initial outside hospitalization for PD catheter malfunction and ADHF iso of volume overload complicated by PEA arrest with conversion to VT after epi and cardioversion post ROSC and recurrent AF w/ RVR.    Patient now transferred to [facility] per patient preference. Remains in rate controlled atrial fibrillation on Diltizem gtt which primary team is discontinuing and transitioning to oral metoprolol tartrate. Per OSH documentation develops a rash with amiodarone, refused sotalol, and was not controlled on po metoprolol. Patient has been off of anticoagulation iso of low platelet counts on heparin (no HIT assay sent and  but is now ordered for heparin gtt. Patient refused ischemic evaluation at outside hospital, but cardiology here now consulted. LVEF in early June was 40-45%, post arrest TTE showed LVEF 20-25%.    This morning patient reports fatigue but is agreeable to trial on metoprolol po and IV heparin gtt. At this time does not have access for iHD

## 2024-07-03 NOTE — ASSESSMENT & PLAN NOTE
Patient with Paroxysmal (<7 days) atrial fibrillation which is controlled currently with  diltiazem . Patient is currently in sinus rhythm.[id number] Score: 4. Pt. Has refused eliquis     Consult cards, appreciate recs

## 2024-07-03 NOTE — RESPIRATORY THERAPY
07/02/24 1957   Patient Assessment/Suction   Level of Consciousness (AVPU) alert   Respiratory Effort Normal;Unlabored   Expansion/Accessory Muscles/Retractions no use of accessory muscles;no retractions   All Lung Fields Breath Sounds Anterior:;clear;diminished   Rhythm/Pattern, Respiratory unlabored   PRE-TX-O2   Device (Oxygen Therapy) high flow nasal cannula   Flow (L/min) (Oxygen Therapy) 2.5   SpO2 (!) 91 %   Pulse Oximetry Type Continuous   $ Pulse Oximetry - Multiple Charge Pulse Oximetry - Multiple   Pulse (!) 119   Resp 19   /60   Positioning HOB elevated 30 degrees   Education   $ Education 15 min;Other (see comment)

## 2024-07-03 NOTE — HPI
Juhi Taylor is a 74-year-old female with a past medical history of CKD stage 5 with recent PD catheter placement on 6/4, RCC s/p right nephrectomy, T2DM, obesity, CAD s/p PCI to Lcx and LAD in 11/2020 and HFmREF who is admitted as a transfer from [facility] for higher level of care and further cardiac evaluation. Nephrology consulted for hemodialysis. She initially presented on 6/18/24 to [facility] for hypervolemia in the setting of CKD 4-5 for which she had undergone elective PD catheter placement complicated by catheter site leaking. She had a complex hospital course, was initially placed on furosemide gtt without significant improvement and later underwent placement of tunneled line for HD with volume removal. During her second dialysis session she went into PEA arrest for which she was resuscitated, intubated and transferred to ICU. She was noted to have elevated troponin and subsequent TTE found reduced EF from 40-45% to 20-25% for which coronary angiography was recommended. Her hospital course was also complicated by dialysis line infection, significant bleeding around the catheter site, bradycardia and subsequent atrial fibrillation with RVR. Given the catheter site bleeding, anticoagulation was temporarily held. She has reported allergy to amiodarone, was initially placed on diltiazem for atrial fibrillation despite reduced EF. She discussed GOC at outside hospital where it appears that she lacked capacity per note and decided to pursue higher level of care at [facility]. At that time, gen surgery was planning to place a left tunneled catheter due to concerns of the right side not being suitable     Upon admit, patient appeared drowsy but conversant. Daughter reports that she typically gets lethargic 2/2 to the volume overload due to not receiving dialysis. Patient reported b/l left lower extremity pain. Daughter concerned about her not having dialysis in 4 days and wants a session today.      Pt initially refusing  dialysis. Seen by nephrology. [facility] 7.220/33.4/34/13.7/-14. Critical care medicine consulted for emergent trialysis line placement and dialysis.

## 2024-07-03 NOTE — SUBJECTIVE & OBJECTIVE
"Past Medical History:   Diagnosis Date    Anticoagulant long-term use     Arthritis     Breast cancer 2014    invasive lobular carcinoma    Cancer of kidney 11/2020    RIGHT KIDNEY CANCER    CHF (congestive heart failure)     Coronary artery disease dx 2005    Depression     Diabetes mellitus     Diastolic heart failure secondary to hypertension     Gout     Hyperlipemia     Hypertension     Hypertrophy of nasal turbinates     Kidney mass 2020    Right    Levoscoliosis     Lung nodule     left    Multiple thyroid nodules     NICOLE (obstructive sleep apnea)     uses C-PAP    Pulmonary hypertension        Past Surgical History:   Procedure Laterality Date    AORTOGRAPHY N/A 12/04/2020    Procedure: Aortogram;  Surgeon: Paul Pedersen MD;  Location: [facility] CATH;  Service: Cardiology;  Laterality: N/A;    BREAST SURGERY      CARDIAC CATHETERIZATION  12/2020    CHOLECYSTECTOMY      COLONOSCOPY      multi -last 2014     CORONARY ARTERY BYPASS GRAFT      ESOPHAGOGASTRODUODENOSCOPY      2012     HAND SURGERY Right     INSERTION OF CATHETER N/A 6/23/2024    Procedure: Insertion,catheter;  Surgeon: Meli Griffin MD;  Location: [facility] OR;  Service: Vascular;  Laterality: N/A;  ORIGINAL CATHETER WAS REPOSITIONED.  NO NEW CATHETER WAS PLACED    INSERTION, CATHETER, DIALYSIS, PERITONEAL N/A 6/4/2024    Procedure: IN Insertion Catheter, Dialysis, Peritoneal - laparoscopic;  Surgeon: Rodriguez Catalan Jr., MD;  Location: [facility] OR;  Service: General;  Laterality: N/A;    LAPAROSCOPIC ROBOT-ASSISTED SURGICAL REMOVAL OF KIDNEY USING DA CHELLE XI Right 03/10/2022    Procedure: XI ROBOTIC NEPHRECTOMY- radical;  Surgeon: Rolando Ramirez MD;  Location: [facility] OR;  Service: Urology;  Laterality: Right;    MASTECTOMY W/ SENTINEL NODE BIOPSY Bilateral 01/21/2015    bilateral "dog ears"    NASAL SINUS SURGERY  2015    Dr Brynat FESS/cauterization turbinate     PARTIAL HYSTERECTOMY  1989    PERCUTANEOUS TRANSLUMINAL BALLOON ANGIOPLASTY OF " CORONARY ARTERY  12/04/2020    Procedure: Angioplasty-coronary;  Surgeon: Paul Pedersen MD;  [facility] CATH;  Service: Cardiology;;    RENAL BIOPSY Right     9/20/2021 EJ    TUBAL LIGATION      ULTRASOUND GUIDANCE  12/04/2020    Procedure: Ultrasound Guidance;  Surgeon: Paul Pedersen MD;  [facility] CATH;  Service: Cardiology;;       Review of patient's allergies indicates:   Allergen Reactions    Percocet [oxycodone-acetaminophen] Itching    Amiodarone analogues      Itching      Irbesartan Swelling    Januvia [sitagliptin]     Jardiance [empagliflozin]      Leg cramps    Lipitor [atorvastatin] Other (See Comments)     Severe leg pain    Linaclotide Other (See Comments) and Nausea And Vomiting     Does not remember    Lubiprostone Other (See Comments) and Palpitations     Does not remember       Current Facility-Administered Medications on File Prior to Encounter   Medication    [COMPLETED] desmopressin (DDAVP) 39.692 mcg in 0.9% NaCl 50 mL IVPB    [DISCONTINUED] acetaminophen tablet 650 mg    [DISCONTINUED] aluminum-magnesium hydroxide-simethicone 200-200-20 mg/5 mL suspension 30 mL    [DISCONTINUED] cefepime 2 g in dextrose 5% 100 mL IVPB (ready to mix)    [DISCONTINUED] dextrose 50% injection 12.5 g    [DISCONTINUED] dextrose 50% injection 25 g    [DISCONTINUED] diltiaZEM 100 mg in dextrose 5% 100 mL IVPB (ready to mix) (non-titrating)    [DISCONTINUED] epoetin alaina-epbx injection 7,400 Units    [DISCONTINUED] famotidine tablet 20 mg    [DISCONTINUED] furosemide injection 40 mg    [DISCONTINUED] glucagon (human recombinant) injection 1 mg    [DISCONTINUED] glucose chewable tablet 24 g    [DISCONTINUED] guaiFENesin 100 mg/5 ml syrup 400 mg    [DISCONTINUED] heparin (porcine) injection 5,000 Units    [DISCONTINUED] hydrOXYzine HCL tablet 25 mg    [DISCONTINUED] melatonin tablet 6 mg    [DISCONTINUED] metoprolol tartrate (LOPRESSOR) tablet 25 mg    [DISCONTINUED] ondansetron injection 4 mg     [DISCONTINUED] vancomycin - pharmacy to dose     Current Outpatient Medications on File Prior to Encounter   Medication Sig    allopurinoL (ZYLOPRIM) 300 MG tablet Take 1 tablet (300 mg total) by mouth once daily.    amLODIPine (NORVASC) 10 MG tablet Take 10 mg by mouth once daily.    blood sugar diagnostic (TRUE METRIX GLUCOSE TEST STRIP) Strp Use 1x daily. Insurance preferred.    blood sugar diagnostic Strp To check BG 1 time daily, to use with insurance preferred meter    calcitRIOL (ROCALTROL) 0.5 MCG Cap Take 0.5 mcg by mouth once daily.    carvediloL (COREG) 25 MG tablet Take 1 tablet (25 mg total) by mouth 2 (two) times daily.    cloNIDine (CATAPRES) 0.1 MG tablet Take 1 tablet (0.1 mg total) by mouth 3 (three) times daily as needed (PRN SBP > 165 mmHg).    cloNIDine 0.1 mg/24 hr td ptwk (CATAPRES) 0.1 mg/24 hr Place 1 patch onto the skin every 7 days.    cyanocobalamin (VITAMIN B-12) 100 MCG tablet Take 100 mcg by mouth once daily.    ELIQUIS 5 mg Tab TAKE 1 TABLET(5 MG) BY MOUTH TWICE DAILY    evolocumab (REPATHA SURECLICK) 140 mg/mL PnIj Inject 1 mL (140 mg total) into the skin every 14 (fourteen) days.    furosemide (LASIX) 40 MG tablet Take 40 mg by mouth once daily.    lancets Misc To check BG 1 times daily, to use with insurance preferred meter    loratadine (CLARITIN) 10 mg tablet Take 10 mg by mouth once daily.    magnesium oxide (MAG-OX) 400 mg (241.3 mg magnesium) tablet Take 1 tablet (400 mg total) by mouth daily as needed (cramping).    nitroGLYCERIN (NITROSTAT) 0.4 MG SL tablet Place 1 tablet (0.4 mg total) under the tongue every 5 (five) minutes as needed for Chest pain.    [DISCONTINUED] aspirin (ECOTRIN) 81 MG EC tablet Take 1 tablet (81 mg total) by mouth once daily.    [DISCONTINUED] DULoxetine (CYMBALTA) 20 MG capsule TAKE ONE CAPSULE BY MOUTH ONCE DAILY    [DISCONTINUED] folic acid (FOLVITE) 1 MG tablet Take 1 mg by mouth once daily.    [DISCONTINUED] metFORMIN (GLUCOPHAGE-XR) 500 MG ER  24hr tablet Take 2 tablets (1,000 mg total) by mouth 2 (two) times daily with meals.    [DISCONTINUED] sodium bicarbonate 650 MG tablet Take 1 tablet (650 mg total) by mouth once daily.     Family History       Problem Relation (Age of Onset)    Asthma Daughter    Birth defects Daughter    Breast cancer Mother, Maternal Aunt    Depression Daughter    Drug abuse Daughter, Daughter    Glaucoma Sister    Hepatitis Brother    Hypertension Father    Learning disabilities Daughter    Mental illness Daughter    Stroke Father          Tobacco Use    Smoking status: Former     Current packs/day: 0.00     Types: Cigarettes     Start date: 2016     Quit date: 2016     Years since quittin.5    Smokeless tobacco: Never    Tobacco comments:     quit    Substance and Sexual Activity    Alcohol use: No    Drug use: No    Sexual activity: Not Currently     Partners: Male     Birth control/protection: None     Review of Systems   Constitutional:         Unable to obtain secondary to patient frustration with situation     Objective:     Vital Signs (Most Recent):  Temp: 97.6 °F (36.4 °C) (24)  Pulse: 102 (24)  Resp: 20 (24)  BP: 122/89 (24)  SpO2: (!) 94 % (24) Vital Signs (24h Range):  Temp:  [97.6 °F (36.4 °C)-98.6 °F (37 °C)] 97.6 °F (36.4 °C)  Pulse:  [] 102  Resp:  [18-35] 20  SpO2:  [88 %-100 %] 94 %  BP: ()/(55-98) 122/89     Weight: (!) 137 kg (302 lb 0.5 oz)  Body mass index is 47.3 kg/m².     Physical Exam  Constitutional:       General: She is not in acute distress.     Appearance: Normal appearance. She is obese.   HENT:      Head: Normocephalic and atraumatic.      Mouth/Throat:      Mouth: Mucous membranes are moist.      Pharynx: Oropharynx is clear.   Cardiovascular:      Rate and Rhythm: Normal rate and regular rhythm.      Heart sounds: Normal heart sounds.   Pulmonary:      Effort: Pulmonary effort is normal.      Breath  sounds: Normal breath sounds.   Abdominal:      Palpations: Abdomen is soft.   Musculoskeletal:      Right lower leg: Edema (2+) present.      Left lower leg: Edema (2+) present.   Skin:     General: Skin is warm and dry.   Neurological:      General: No focal deficit present.      Mental Status: She is alert and oriented to person, place, and time.   Psychiatric:      Comments: agitated                Significant Labs: All pertinent labs within the past 24 hours have been reviewed.  CBC:   Recent Labs   Lab 07/02/24  0326   WBC 10.98   HGB 11.3*   HCT 39.7        CMP:   Recent Labs   Lab 07/02/24  0326      K 4.7      CO2 18*      BUN 72*   CREATININE 6.2*   CALCIUM 8.7   ANIONGAP 13       Significant Imaging: I have reviewed all pertinent imaging results/findings within the past 24 hours.

## 2024-07-03 NOTE — HPI
74-year-old female with prior history of CKD stage 5 with recent PD catheter placement on 6/4, RCC s/p right nephrectomy, T2DM, obesity, CAD s/p PCI to Lcx and LAD in 11/2020 and HFmREF who is admitted as a transfer from [facility] for higher level of care and further cardiac evaluation. Nephrology consulted for hemodialysis.     She initially presented on 6/18/24 to [facility] for hypervolemia in the setting of CKD 4-5 for which she had undergone elective PD catheter placement complicated by catheter site leaking. She had a complex hospital course, was initially placed on furosemide gtt without significant improvement and later underwent placement of tunneled line for HD with volume removal. During her second dialysis session she went into PEA arrest for which she was resuscitated, intubated and transferred to ICU. She was noted to have elevated troponin and subsequent TTE found reduced EF from 40-45% to 20-25% for which coronary angiography was recommended. Her hospital course was also complicated by dialysis line infection, significant bleeding around the catheter site, bradycardia and subsequent atrial fibrillation with RVR. Given the catheter site bleeding, anticoagulation was temporarily held. She has reported allergy to amiodarone, was initially placed on diltiazem for atrial fibrillation despite reduced EF. She discussed GOC at outside hospital where it appears that she lacked capacity per note and decided to pursue higher level of care at [facility]. At that time, gen surgery was planning to place a left tunneled catheter due to concerns of the right side not being suitable    Upon admit, patient appeared drowsy but conversant. Daughter reports that she typically gets lethargic 2/2 to the volume overload due to not receiving dialysis. Patient reported b/l left lower extremity pain. Daughter concerned about her not having dialysis in 4 days and wants a session today.

## 2024-07-03 NOTE — Clinical Note
The catheter was secured in place in the superior vena cava. Catheter was tunneled under the skin in the upper left chest and sutured in place.

## 2024-07-03 NOTE — CONSULTS
[facility] - [facility] Step[facility]  Cardiology  Consult Note    Patient Name: Juhi Taylor  MRN: [id number]  Admission Date: 7/3/2024  Hospital Length of Stay: 0 days  Code Status: Full Code   Attending Provider: Natanael Munoz MD   Consulting Provider: Connie Ashby DO  Primary Care Physician: Tony Sadler MD  Principal Problem:Severe sepsis    Patient information was obtained from patient, relative(s), past medical records, and ER records.     Inpatient consult to Cardiology  Consult performed by: Connie Ashby DO  Consult ordered by: Tiff Almanza MD        Subjective:     Chief Complaint:  Acute CHF     HPI:   Juhi Taylor is a 74-year-old female with prior history of obesity, NICOLE, DMT2, RCC s/p right nephrectomy, CAD s/p PCI to Lcx and LAD in 11/2020 and HFmREF who is admitted as a transfer from [facility] for higher level of care and further cardiac evaluation. Cardiology is consulted for ischemic evaluation.    She initially presented on 6/18/24 to [facility] for hypervolemia in the setting of CKD 4-5 for which she had undergone elective PD catheter placement complicated by catheter site leaking. She had a complex hospital course, was initially placed on furosemide infusion without significant improvement and later underwent placement of tunneled line for HD with volume removal. During her second dialysis session she went into PEA arrest for which she was resuscitated, intubated and transferred to ICU. She was noted to have elevated troponin and subsequent TTE found reduced EF from 40-45% to 20-25% for which coronary angiography was recommended. Her hospital course was also complicated by dialysis line infection, significant bleeding around the catheter site, bradycardia and subsequent atrial fibrillation with RVR. Given the catheter site bleeding, anticoagulation was temporarily held. She has reported allergy to amiodarone, was initially placed on diltiazem for atrial fibrillation despite  reduced EF.    She discussed GOC at outside hospital and decided to pursue higher level of care at [facility]. She arrived on 7/3/24 to [facility] with plans for dialysis and angiography. She reports feeling chest pain during prior hospitalization but cannot describe any further in terms of quality or timing. She currently denies chest pain and states her breathing feels okay while in her semi-reclined position. She notes persistent, though improved, lower extremity edema. Her last dialysis was 4 days ago. She thinks she may still make urine but is unsure. Daughter states that she is more somnolent and confused as she has not had dialysis in several days.    Past Medical History:   Diagnosis Date    Anticoagulant long-term use     Arthritis     Breast cancer 2014    invasive lobular carcinoma    Cancer of kidney 11/2020    RIGHT KIDNEY CANCER    CHF (congestive heart failure)     Coronary artery disease dx 2005    Depression     Diabetes mellitus     Diastolic heart failure secondary to hypertension     Gout     Hyperlipemia     Hypertension     Hypertrophy of nasal turbinates     Kidney mass 2020    Right    Levoscoliosis     Lung nodule     left    Multiple thyroid nodules     NICOLE (obstructive sleep apnea)     uses C-PAP    Pulmonary hypertension        Past Surgical History:   Procedure Laterality Date    AORTOGRAPHY N/A 12/04/2020    Procedure: Aortogram;  Surgeon: Paul Pedersen MD;  Location: [facility] CATH;  Service: Cardiology;  Laterality: N/A;    BREAST SURGERY      CARDIAC CATHETERIZATION  12/2020    CHOLECYSTECTOMY      COLONOSCOPY      multi -last 2014     CORONARY ARTERY BYPASS GRAFT      ESOPHAGOGASTRODUODENOSCOPY      2012     HAND SURGERY Right     INSERTION OF CATHETER N/A 6/23/2024    Procedure: Insertion,catheter;  Surgeon: Meli Griffin MD;  Location: [facility] OR;  Service: Vascular;  Laterality: N/A;  ORIGINAL CATHETER WAS REPOSITIONED.  NO NEW CATHETER WAS PLACED    INSERTION, CATHETER, DIALYSIS, PERITONEAL N/A  "6/4/2024    Procedure: IN Insertion Catheter, Dialysis, Peritoneal - laparoscopic;  Surgeon: Rodriguez Catalan Jr., MD;  Location: [facility] OR;  Service: General;  Laterality: N/A;    LAPAROSCOPIC ROBOT-ASSISTED SURGICAL REMOVAL OF KIDNEY USING DA CHELLE XI Right 03/10/2022    Procedure: XI ROBOTIC NEPHRECTOMY- radical;  Surgeon: Rolando Ramirez MD;  Location: [facility] OR;  Service: Urology;  Laterality: Right;    MASTECTOMY W/ SENTINEL NODE BIOPSY Bilateral 01/21/2015    bilateral "dog ears"    NASAL SINUS SURGERY  2015    Dr Bryant FESS/cauterization turbinate     PARTIAL HYSTERECTOMY  1989    PERCUTANEOUS TRANSLUMINAL BALLOON ANGIOPLASTY OF CORONARY ARTERY  12/04/2020    Procedure: Angioplasty-coronary;  Surgeon: Paul Pedersen MD;  Location: [facility] CATH;  Service: Cardiology;;    RENAL BIOPSY Right     9/20/2021 EJ    TUBAL LIGATION      ULTRASOUND GUIDANCE  12/04/2020    Procedure: Ultrasound Guidance;  Surgeon: Paul Pedersen MD;  Location: [facility] CATH;  Service: Cardiology;;       Review of patient's allergies indicates:   Allergen Reactions    Percocet [oxycodone-acetaminophen] Itching    Amiodarone analogues      Itching      Irbesartan Swelling    Januvia [sitagliptin]     Jardiance [empagliflozin]      Leg cramps    Lipitor [atorvastatin] Other (See Comments)     Severe leg pain    Linaclotide Other (See Comments) and Nausea And Vomiting     Does not remember    Lubiprostone Other (See Comments) and Palpitations     Does not remember       Current Facility-Administered Medications on File Prior to Encounter   Medication    [COMPLETED] desmopressin (DDAVP) 39.692 mcg in 0.9% NaCl 50 mL IVPB    [DISCONTINUED] acetaminophen tablet 650 mg    [DISCONTINUED] aluminum-magnesium hydroxide-simethicone 200-200-20 mg/5 mL suspension 30 mL    [DISCONTINUED] cefepime 2 g in dextrose 5% 100 mL IVPB (ready to mix)    [DISCONTINUED] dextrose 50% injection 12.5 g    [DISCONTINUED] dextrose 50% injection 25 g    [DISCONTINUED] " diltiaZEM 100 mg in dextrose 5% 100 mL IVPB (ready to mix) (non-titrating)    [DISCONTINUED] epoetin alaina-epbx injection 7,400 Units    [DISCONTINUED] famotidine tablet 20 mg    [DISCONTINUED] furosemide injection 40 mg    [DISCONTINUED] glucagon (human recombinant) injection 1 mg    [DISCONTINUED] glucose chewable tablet 24 g    [DISCONTINUED] guaiFENesin 100 mg/5 ml syrup 400 mg    [DISCONTINUED] heparin (porcine) injection 5,000 Units    [DISCONTINUED] hydrOXYzine HCL tablet 25 mg    [DISCONTINUED] melatonin tablet 6 mg    [DISCONTINUED] metoprolol tartrate (LOPRESSOR) tablet 25 mg    [DISCONTINUED] ondansetron injection 4 mg    [DISCONTINUED] vancomycin - pharmacy to dose     Current Outpatient Medications on File Prior to Encounter   Medication Sig    allopurinoL (ZYLOPRIM) 300 MG tablet Take 1 tablet (300 mg total) by mouth once daily.    amLODIPine (NORVASC) 10 MG tablet Take 10 mg by mouth once daily.    blood sugar diagnostic (TRUE METRIX GLUCOSE TEST STRIP) Strp Use 1x daily. Insurance preferred.    blood sugar diagnostic Strp To check BG 1 time daily, to use with insurance preferred meter    calcitRIOL (ROCALTROL) 0.5 MCG Cap Take 0.5 mcg by mouth once daily.    carvediloL (COREG) 25 MG tablet Take 1 tablet (25 mg total) by mouth 2 (two) times daily.    cloNIDine (CATAPRES) 0.1 MG tablet Take 1 tablet (0.1 mg total) by mouth 3 (three) times daily as needed (PRN SBP > 165 mmHg).    cloNIDine 0.1 mg/24 hr td ptwk (CATAPRES) 0.1 mg/24 hr Place 1 patch onto the skin every 7 days.    cyanocobalamin (VITAMIN B-12) 100 MCG tablet Take 100 mcg by mouth once daily.    ELIQUIS 5 mg Tab TAKE 1 TABLET(5 MG) BY MOUTH TWICE DAILY    evolocumab (REPATHA SURECLICK) 140 mg/mL PnIj Inject 1 mL (140 mg total) into the skin every 14 (fourteen) days.    furosemide (LASIX) 40 MG tablet Take 40 mg by mouth once daily.    lancets Misc To check BG 1 times daily, to use with insurance preferred meter    loratadine (CLARITIN) 10 mg  tablet Take 10 mg by mouth once daily.    magnesium oxide (MAG-OX) 400 mg (241.3 mg magnesium) tablet Take 1 tablet (400 mg total) by mouth daily as needed (cramping).    nitroGLYCERIN (NITROSTAT) 0.4 MG SL tablet Place 1 tablet (0.4 mg total) under the tongue every 5 (five) minutes as needed for Chest pain.    [DISCONTINUED] aspirin (ECOTRIN) 81 MG EC tablet Take 1 tablet (81 mg total) by mouth once daily.    [DISCONTINUED] DULoxetine (CYMBALTA) 20 MG capsule TAKE ONE CAPSULE BY MOUTH ONCE DAILY    [DISCONTINUED] folic acid (FOLVITE) 1 MG tablet Take 1 mg by mouth once daily.    [DISCONTINUED] metFORMIN (GLUCOPHAGE-XR) 500 MG ER 24hr tablet Take 2 tablets (1,000 mg total) by mouth 2 (two) times daily with meals.    [DISCONTINUED] sodium bicarbonate 650 MG tablet Take 1 tablet (650 mg total) by mouth once daily.     Family History       Problem Relation (Age of Onset)    Asthma Daughter    Birth defects Daughter    Breast cancer Mother, Maternal Aunt    Depression Daughter    Drug abuse Daughter, Daughter    Glaucoma Sister    Hepatitis Brother    Hypertension Father    Learning disabilities Daughter    Mental illness Daughter    Stroke Father          Tobacco Use    Smoking status: Former     Current packs/day: 0.00     Types: Cigarettes     Start date: 2016     Quit date: 2016     Years since quittin.5    Smokeless tobacco: Never    Tobacco comments:     quit    Substance and Sexual Activity    Alcohol use: No    Drug use: No    Sexual activity: Not Currently     Partners: Male     Birth control/protection: None     Review of Systems   Unable to perform ROS: Mental status change   Constitutional: Positive for malaise/fatigue and weight gain.   Cardiovascular:  Positive for leg swelling. Negative for palpitations.   Gastrointestinal:  Negative for vomiting.     Objective:     Vital Signs (Most Recent):  Temp: 98.2 °F (36.8 °C) (24 0811)  Pulse: 68 (24 0811)  Resp: 20 (24  0811)  BP: 120/72 (07/03/24 0811)  SpO2: 100 % (07/03/24 0811) Vital Signs (24h Range):  Temp:  [97.6 °F (36.4 °C)-98.6 °F (37 °C)] 98.2 °F (36.8 °C)  Pulse:  [] 68  Resp:  [19-35] 20  SpO2:  [88 %-100 %] 100 %  BP: ()/(55-97) 120/72     Weight: (!) 137 kg (302 lb 0.5 oz)  Body mass index is 47.3 kg/m².    SpO2: 100 %       No intake or output data in the 24 hours ending 07/03/24 0829    Lines/Drains/Airways       Drain  Duration             Female External Urinary Catheter w/ Suction 07/03/24 0430 <1 day              Peripheral Intravenous Line  Duration                  Peripheral IV - Single Lumen 07/01/24 1800 18 G 3/4 in No Right Antecubital 1 day                     Physical Exam  Vitals and nursing note reviewed.   Constitutional:       General: She is not in acute distress.     Appearance: She is obese. She is ill-appearing. She is not toxic-appearing.      Interventions: Nasal cannula in place.   HENT:      Head: Normocephalic and atraumatic.   Eyes:      Extraocular Movements: Extraocular movements intact.      Pupils: Pupils are equal, round, and reactive to light.   Cardiovascular:      Rate and Rhythm: Normal rate. Rhythm irregular.      Pulses:           Radial pulses are 2+ on the right side and 2+ on the left side.      Heart sounds: Heart sounds are distant.      Gallop present. S3 sounds present.   Pulmonary:      Effort: Pulmonary effort is normal. No respiratory distress.      Breath sounds: No stridor. No wheezing or rhonchi.   Abdominal:      General: There is no distension.      Palpations: Abdomen is soft.      Tenderness: There is no abdominal tenderness. There is no guarding.   Musculoskeletal:      Cervical back: Normal range of motion and neck supple.      Right lower leg: 3+ Pitting Edema present.      Left lower leg: 3+ Pitting Edema present.   Skin:     General: Skin is cool.   Neurological:      General: No focal deficit present.      Mental Status: She is lethargic.       Motor: Weakness present.      Gait: Gait abnormal.   Psychiatric:         Mood and Affect: Mood normal.         Behavior: Behavior normal. Behavior is cooperative.          Significant Labs: All pertinent lab results from the last 24 hours have been reviewed.    Significant Imaging: Echocardiogram: Transthoracic echo (TTE) complete ([facility] Only):   Results for orders placed or performed during the hospital encounter of 06/18/24   Echo   Result Value Ref Range    BSA 2.64 m2    Narrative      Left Ventricle: The left ventricle is normal in size. Normal wall   thickness. Severe global hypokinesis present. There is severely reduced   systolic function with a visually estimated ejection fraction of 20 - 25%.   Grade III diastolic dysfunction.    Right Ventricle: Mild right ventricular enlargement. Wall thickness is   normal. Right ventricle wall motion has global hypokinesis. Systolic   function is moderately reduced.    Left Atrium: Left atrium is moderately dilated.    Tricuspid Valve: There is moderate regurgitation with an anteromedial   eccentrically directed jet.    Overall the study quality was technically difficult. The study was   difficult due to patient's clinical status and body habitus.       Assessment and Plan:     Atrial fibrillation with RVR  Patient with Paroxysmal (<7 days) atrial fibrillation which is controlled currently with Beta Blocker. Patient is currently in atrial fibrillation.[id number] Score: 4. Anticoagulation indicated. Anticoagulation done with heparin .    Recommendations:  - continue on metoprolol tartrate 25mg PO BID  - goal HR < 110 bpm  - continue anticoagulation, currently on heparin  - monitor electrolytes, currently on HD  - diltiazem contraindicated in setting of reduced EF    Acute on chronic combined systolic and diastolic heart failure  74-year-old female with DMT2, ESRD on HD, pAF, CAD s/p PCI and HFrEF is admitted as a transfer for acute HFrEF in setting of CKD5 and recent  PEA arrest during dialysis. PEA likely due to complications related to dialysis.    Recommendations:  - optimize volume status with HD per nephrology  - will reassess status post dialysis for further GDMT   - not a candidate for SGLT2 due to HD   - will consider ACE/ARB/ARNI as BP allows   - currently on BB with metoprolol tartrate for atrial fibrillation  - will defer angiography at this time, will consider ischemic workup with potential PET stress  - will assess need for CRT-D when stabilized, likely in outpatient setting        VTE Risk Mitigation (From admission, onward)           Ordered     heparin 25,000 units in dextrose 5% (100 units/ml) IV bolus from bag LOW INTENSITY nomogram - [facility]  As needed (PRN)        Question:  Heparin Infusion Adjustment (DO NOT MODIFY ANSWER)  Answer:  [email or web address]\Images\Pharmacy\HeparinInfusions\heparin LOW INTENSITY nomogram for [facility] [id number]    07/03/24 0727     heparin 25,000 units in dextrose 5% (100 units/ml) IV bolus from bag LOW INTENSITY nomogram - [facility]  As needed (PRN)        Question:  Heparin Infusion Adjustment (DO NOT MODIFY ANSWER)  Answer:  [email or web address]\Images\Pharmacy\HeparinInfusions\heparin LOW INTENSITY nomogram for [facility] [id number]    07/03/24 0727     heparin 25,000 units in dextrose 5% 250 mL (100 units/mL) infusion LOW INTENSITY nomogram - [facility]  Continuous        Question:  Begin at (units/kg/hr)  Answer:  12    07/03/24 0783     Place sequential compression device  Until discontinued         07/03/24 0749                    Thank you for your consult. I will follow-up with patient. Please contact us if you have any additional questions.    Connie Ashby, DO  Cardiology   [facility] - Cardiology Stepdown

## 2024-07-03 NOTE — CONSULTS
[facility] - Cardiology Stepdown  Nephrology  Consult Note    Patient Name: Juhi Taylor  MRN: [id number]  Admission Date: 7/3/2024  Hospital Length of Stay: 0 days  Attending Provider: Natanael Munoz MD   Primary Care Physician: Tony Sadler MD  Principal Problem:Severe sepsis    Inpatient consult to Nephrology  Consult performed by: Yonathan Souza DO  Consult ordered by: Tiff Almanza MD        Subjective:     HPI: 74-year-old female with prior history of CKD stage 5 with recent PD catheter placement on 6/4, RCC s/p right nephrectomy, T2DM, obesity, CAD s/p PCI to Lcx and LAD in 11/2020 and HFmREF who is admitted as a transfer from [facility] for higher level of care and further cardiac evaluation. Nephrology consulted for hemodialysis.     She initially presented on 6/18/24 to [facility] for hypervolemia in the setting of CKD 4-5 for which she had undergone elective PD catheter placement complicated by catheter site leaking. She had a complex hospital course, was initially placed on furosemide gtt without significant improvement and later underwent placement of tunneled line for HD with volume removal. During her second dialysis session she went into PEA arrest for which she was resuscitated, intubated and transferred to ICU. She was noted to have elevated troponin and subsequent TTE found reduced EF from 40-45% to 20-25% for which coronary angiography was recommended. Her hospital course was also complicated by dialysis line infection, significant bleeding around the catheter site, bradycardia and subsequent atrial fibrillation with RVR. Given the catheter site bleeding, anticoagulation was temporarily held. She has reported allergy to amiodarone, was initially placed on diltiazem for atrial fibrillation despite reduced EF. She discussed GOC at outside hospital where it appears that she lacked capacity per note and decided to pursue higher level [place] [facility] At that time, gen surgery was planning to  place a left tunneled catheter due to concerns of the right side not being suitable    Upon admit, patient appeared drowsy but conversant. Daughter reports that she typically gets lethargic 2/2 to the volume overload due to not receiving dialysis. Patient reported b/l left lower extremity pain. Daughter concerned about her not having dialysis in 4 days and wants a session today.     Past Medical History:   Diagnosis Date    Anticoagulant long-term use     Arthritis     Breast cancer 2014    invasive lobular carcinoma    Cancer of kidney 11/2020    RIGHT KIDNEY CANCER    CHF (congestive heart failure)     Coronary artery disease dx 2005    Depression     Diabetes mellitus     Diastolic heart failure secondary to hypertension     Gout     Hyperlipemia     Hypertension     Hypertrophy of nasal turbinates     Kidney mass 2020    Right    Levoscoliosis     Lung nodule     left    Multiple thyroid nodules     NICOLE (obstructive sleep apnea)     uses C-PAP    Pulmonary hypertension        Past Surgical History:   Procedure Laterality Date    AORTOGRAPHY N/A 12/04/2020    Procedure: Aortogram;  Surgeon: Paul Pedersen MD;  Location: [facility] CATH;  Service: Cardiology;  Laterality: N/A;    BREAST SURGERY      CARDIAC CATHETERIZATION  12/2020    CHOLECYSTECTOMY      COLONOSCOPY      multi -last 2014     CORONARY ARTERY BYPASS GRAFT      ESOPHAGOGASTRODUODENOSCOPY      2012     HAND SURGERY Right     INSERTION OF CATHETER N/A 6/23/2024    Procedure: Insertion,catheter;  Surgeon: Meli Griffin MD;  Location: [facility] OR;  Service: Vascular;  Laterality: N/A;  ORIGINAL CATHETER WAS REPOSITIONED.  NO NEW CATHETER WAS PLACED    INSERTION, CATHETER, DIALYSIS, PERITONEAL N/A 6/4/2024    Procedure: IN Insertion Catheter, Dialysis, Peritoneal - laparoscopic;  Surgeon: Rodriguez Catalan Jr., MD;  Location: [facility] OR;  Service: General;  Laterality: N/A;    LAPAROSCOPIC ROBOT-ASSISTED SURGICAL REMOVAL OF KIDNEY USING DA CHELLE XI Right  "03/10/2022    Procedure: XI ROBOTIC NEPHRECTOMY- radical;  Surgeon: Rolando Ramirez MD;  Location: [facility] OR;  Service: Urology;  Laterality: Right;    MASTECTOMY W/ SENTINEL NODE BIOPSY Bilateral 01/21/2015    bilateral "dog ears"    NASAL SINUS SURGERY  2015    Dr Bryant FESS/cauterization turbinate     PARTIAL HYSTERECTOMY  1989    PERCUTANEOUS TRANSLUMINAL BALLOON ANGIOPLASTY OF CORONARY ARTERY  12/04/2020    Procedure: Angioplasty-coronary;  Surgeon: Paul Pedersen MD;  Location: [facility] CATH;  Service: Cardiology;;    RENAL BIOPSY Right     9/20/2021 EJ    TUBAL LIGATION      ULTRASOUND GUIDANCE  12/04/2020    Procedure: Ultrasound Guidance;  Surgeon: Paul Pedersen MD;  [facility];  Service: Cardiology;;       Review of patient's allergies indicates:   Allergen Reactions    Percocet [oxycodone-acetaminophen] Itching    Amiodarone analogues      Itching      Irbesartan Swelling    Januvia [sitagliptin]     Jardiance [empagliflozin]      Leg cramps    Lipitor [atorvastatin] Other (See Comments)     Severe leg pain    Linaclotide Other (See Comments) and Nausea And Vomiting     Does not remember    Lubiprostone Other (See Comments) and Palpitations     Does not remember     Current Facility-Administered Medications   Medication Frequency    ceFEPIme (MAXIPIME) 1 g in D5W 100 mL IVPB (MB+) Q24H    dextromethorphan-guaiFENesin  mg/5 ml liquid 5 mL Q4H PRN    famotidine tablet 20 mg Daily    furosemide injection 40 mg Q12H    gentamicin 0.1 % ointment TID    heparin 25,000 units in dextrose 5% (100 units/ml) IV bolus from bag LOW INTENSITY nomogram - [facility] PRN    heparin 25,000 units in dextrose 5% (100 units/ml) IV bolus from bag LOW INTENSITY nomogram - [facility] PRN    heparin 25,000 units in dextrose 5% 250 mL (100 units/mL) infusion LOW INTENSITY nomogram - [facility] Continuous    metoprolol tartrate (LOPRESSOR) tablet 25 mg BID    vancomycin (VANCOCIN) 500 mg in D5W 100 mL IVPB (MB+) Once    vancomycin - " pharmacy to dose pharmacy to manage frequency     Family History       Problem Relation (Age of Onset)    Asthma Daughter    Birth defects Daughter    Breast cancer Mother, Maternal Aunt    Depression Daughter    Drug abuse Daughter, Daughter    Glaucoma Sister    Hepatitis Brother    Hypertension Father    Learning disabilities Daughter    Mental illness Daughter    Stroke Father          Tobacco Use    Smoking status: Former     Current packs/day: 0.00     Types: Cigarettes     Start date: 2016     Quit date: 2016     Years since quittin.5    Smokeless tobacco: Never    Tobacco comments:     quit    Substance and Sexual Activity    Alcohol use: No    Drug use: No    Sexual activity: Not Currently     Partners: Male     Birth control/protection: None     Review of Systems   Cardiovascular:  Positive for leg swelling.   Musculoskeletal:  Positive for arthralgias.   All other systems reviewed and are negative.    Objective:     Vital Signs (Most Recent):  Temp: 98.1 °F (36.7 °C) (24 1201)  Pulse: 75 (24 1201)  Resp: 20 (24 1201)  BP: 130/89 (24 1201)  SpO2: (!) 91 % (24 1201) Vital Signs (24h Range):  Temp:  [97.6 °F (36.4 °C)-98.2 °F (36.8 °C)] 98.1 °F (36.7 °C)  Pulse:  [] 75  Resp:  [19-35] 20  SpO2:  [88 %-100 %] 91 %  BP: ()/(55-95) 130/89     Weight: (!) 137 kg (302 lb 0.5 oz) (24 0439)  Body mass index is 47.3 kg/m².  Body surface area is 2.54 meters squared.    No intake/output data recorded.     Physical Exam  Constitutional:       General: She is not in acute distress.     Appearance: Normal appearance. She is obese.   HENT:      Head: Normocephalic and atraumatic.      Mouth/Throat:      Mouth: Mucous membranes are moist.      Pharynx: Oropharynx is clear.   Cardiovascular:      Rate and Rhythm: Normal rate and regular rhythm.      Heart sounds: Normal heart sounds.   Pulmonary:      Effort: Pulmonary effort is normal.      Breath  sounds: Normal breath sounds.   Abdominal:      Palpations: Abdomen is soft.   Musculoskeletal:      Right lower leg: Edema (2+) present.      Left lower leg: Edema (2+) present.   Skin:     General: Skin is warm and dry.   Neurological:      General: No focal deficit present.      Mental Status: She is alert and oriented to person, place, and time.          Significant Labs:  All labs within the past 24 hours have been reviewed.    Significant Imaging:  All imaging within the past 24 hours have been reviewed.  Assessment/Plan:     Renal/  CKD stage 4 secondary to hypertension  74-year-old female with DMT2, ESRD on HD, pAF, CAD s/p PCI and HFrEF is admitted as a transfer for acute HFrEF in setting of CKD5 and recent PEA arrest during dialysis. Recent PD catheter placement on 6/4 but concerned for a possibly leak from cath. Unable to initiate dialysis for 2 weeks PTA to OSH due to insurance issues. Hospital course complicated by infected tunneled cath removed on 6/30. Transferred here for higher level of care. Initial plan at OSH was to replace tunneled catheter on the left side due to concerns to that right side may not by suitable.     Plan:  - will trial a session today through PD catheter to assess functionality; if tolerated, will plan for nightly PD  - peritoneal fluid studies and cx to r/o peritonitis   - Trend sCr  - Trend RFP; replete electrolytes PRN for goal K > 4, Phos > 3, Mg > 2  - Strict I&Os  - Avoid nephrotoxic agents  - Renally adjust medications        Thank you for your consult. I will follow-up with patient. Please contact us if you have any additional questions.    Yonathan Souza DO  Nephrology  [facility] - Cardiology Stepdown   verbal cues/nonverbal cues (demo/gestures)/1 person assist

## 2024-07-03 NOTE — PROGRESS NOTES
07/03/24 1330        Peritoneal Dialysis Catheter 06/04/24 Other Left lower abdomen   Placement Date: 06/04/24   Present Prior to Hospital Arrival?: No  Inserted by: MD  Catheter Type: (c) Other  Catheter Location: Left lower abdomen   Site Assessment Clean;Dry;Intact;No swelling;No warmth;No drainage;Red  (mild redness around site, no oozing noted)   Dressing Intervention Sterile dressing change   Status Clamped   Dressing Status Clean;Dry;Intact   Dressing Gauze   Securement secured to abdomen with tape   Clamp Status clamped   Peritoneal Dialysis   Exchange Type Manual   Peritoneal Treatment Status Started   Dianeal Solution Dextrose 1.5% in 2000 mL   Manual Peritoneal Dialysis   Manual Fill Volume (mL) 1000 mL   Manual Drain Volume (mL) 0 mL   Manual Treatment Comments instilled 1 liter 1.5% dextrose dianeal solution as ordered     CAPD manual exchange started- unable to drain any effluent at this time. Instilled 1 liter of 1.5% dextrose low calcium dianeal solution as ordered- no flow issues. PD site cleaned at this time, no oozing noted but mild redness at insertion site is present. Site dressed with gauze and tape. Pt line clamped, betadine cap placed aseptically, and secured to pt's abdomen with tape.

## 2024-07-04 PROBLEM — J96.91 HYPOXIC RESPIRATORY FAILURE: Status: ACTIVE | Noted: 2024-07-04

## 2024-07-04 LAB
BACTERIA BLD CULT: NORMAL
BACTERIA BLD CULT: NORMAL
OHS QRS DURATION: 140 MS
OHS QRS DURATION: 146 MS
OHS QTC CALCULATION: 422 MS
OHS QTC CALCULATION: 467 MS

## 2024-07-04 NOTE — ASSESSMENT & PLAN NOTE
Tunneled line removed due to possible infection.  BMP reviewed- noted Estimated Creatinine Clearance: 10.7 mL/min (A) (based on SCr of 6.9 mg/dL (H)). according to latest data. Based on current GFR, CKD stage is stage 5 - GFR < 15.  Monitor UOP and serial BMP and adjust therapy as needed. Renally dose meds. Avoid nephrotoxic medications and procedures.    -nightly PD per nephrology  -HD line if PD not adequately correcting volume  -200mg IV lasix q12hr  -fluid restriction 1.5L  -strict I/Os

## 2024-07-04 NOTE — ASSESSMENT & PLAN NOTE
74-year-old female with DMT2, ESRD on HD, pAF, CAD s/p PCI and HFrEF is admitted as a transfer for acute HFrEF in setting of CKD5 and recent PEA arrest during dialysis. PEA likely due to complications related to dialysis.    Recommendations:  - optimize volume status with HD per nephrology  - will reassess status post dialysis for further GDMT   - not a candidate for SGLT2 due to HD   - consider adding ACE/ARB/ARNI if BP allows   - EP following for Afib, controlled on telemetry, continue lopressor TID and up-titrate if needed   - consider increasing lasix dose, 500 cc UOP on lasix 40 IV BID   - Once volume status optimized per PD and diuretics, consider interventional consult for LHC if ischemic workup needed  - will defer angiography at this time, will consider ischemic workup with potential PET stress  - will assess need for CRT-D when stabilized, likely in outpatient setting

## 2024-07-04 NOTE — PROGRESS NOTES
Pharmacokinetic Assessment Follow Up: IV Vancomycin    Vancomycin serum concentration assessment(s):    Vanc conc = 20 mcg/mL. On PD    Vancomycin Regimen Plan:  Hold vancomycin for now  Obtain concentration in AM 7/6  Goal trough = 15-20 mcg/mL      Thank you for the consult, will continue to follow  Eddie Okeefe, Pharm.D., BCPS  [id number]       Patient brief summary:  Juhi Taylor is a 74 y.o. female initiated on antimicrobial therapy with IV Vancomycin for treatment of sepsis    Drug levels (last 3 results):  Recent Labs   Lab Result Units 07/03/24  0727 07/04/24  0528   Vancomycin, Random ug/mL 15.1 20.0       Drug Allergies:   Review of patient's allergies indicates:   Allergen Reactions    Percocet [oxycodone-acetaminophen] Itching    Amiodarone analogues      Itching      Irbesartan Swelling    Januvia [sitagliptin]     Jardiance [empagliflozin]      Leg cramps    Lipitor [atorvastatin] Other (See Comments)     Severe leg pain    Linaclotide Other (See Comments) and Nausea And Vomiting     Does not remember    Lubiprostone Other (See Comments) and Palpitations     Does not remember       Actual Body Weight:   137 kg    Renal Function:   Estimated Creatinine Clearance: 10.5 mL/min (A) (based on SCr of 7 mg/dL (H)).,     Dialysis Method (if applicable):  PD    CBC (last 72 hours):  Recent Labs   Lab Result Units 07/02/24  0326 07/03/24  0727 07/03/24  0809 07/04/24  0528   WBC K/uL 10.98 9.05 10.33 9.54  9.54   Hemoglobin g/dL 11.3* 10.9* 10.6* 10.7*  10.7*   Hematocrit % 39.7 36.4* 35.0* 34.8*  34.8*   Platelets K/uL 151 281 260 284  284   Gran % % 64.9 64.3 61.9 73.1*  73.1*   Lymph % % 18.3 19.9 19.2 14.4*  14.4*   Mono % % 13.8 12.8 15.5* 10.2  10.2   Eosinophil % % 1.9 1.9 2.4 1.4  1.4   Basophil % % 0.6 0.7 0.6 0.5  0.5   Differential Method  Automated Automated Automated Automated  Automated       Metabolic Panel (last 72 hours):  Recent Labs   Lab Result Units 07/02/24  0326 07/03/24  0771    Sodium mmol/L 136 137   Potassium mmol/L 4.7 5.0   Chloride mmol/L 105 107   CO2 mmol/L 18* 18*   Glucose mg/dL 108 117*   BUN mg/dL 72* 88*   Creatinine mg/dL 6.2* 7.0*   Albumin g/dL  --  2.6*   Total Bilirubin mg/dL  --  0.8   Alkaline Phosphatase U/L  --  101   AST U/L  --  15   ALT U/L  --  11   Magnesium mg/dL 2.4 2.5       Vancomycin Administrations:  vancomycin given in the last 96 hours                     vancomycin (VANCOCIN) 500 mg in D5W 100 mL IVPB (MB+) (mg) 500 mg New Bag 07/03/24 1556    vancomycin 500 mg in dextrose 5 % 100 mL IVPB (ready to mix) (mg) 500 mg New Bag 07/01/24 1432                    Microbiologic Results:  Microbiology Results (last 7 days)       Procedure Component Value Units Date/Time    Aerobic culture [[id number]] Collected: 07/03/24 1530    Order Status: Completed Specimen: Peritoneal Fluid Updated: 07/04/24 0744     Aerobic Bacterial Culture No growth    Gram stain [[id number]] Collected: 07/03/24 1530    Order Status: Completed Specimen: Body Fluid from Peritoneal Fluid Updated: 07/03/24 1844     Gram Stain Result Rare WBC's      No organisms seen    Fungus culture [[id number]] Collected: 07/03/24 1530    Order Status: Sent Specimen: Body Fluid from Peritoneal Fluid Updated: 07/03/24 1631    Aerobic culture [[id number]] Collected: 07/03/24 1530    Order Status: Canceled Specimen: Peritoneal Fluid

## 2024-07-04 NOTE — PROGRESS NOTES
[facility] - Cardiology Stepdown  Cardiac Electrophysiology  Progress Note    Admission Date: 7/3/2024  Code Status: Full Code   Attending Physician: Herb Coreas MD   Expected Discharge Date: 7/8/2024  Principal Problem:Hypoxic respiratory failure    Subjective:     Interval History:   - heparin not running this morning while I was at bedside  - metoprolol increased to 25mg TID  - Afib rate controlled on tele  - Patient reporting depressive symptoms and despondency related to reduced QOL and poor functional status  - PD overnight with 1.2L of fluid removed, 0.5L of urine made yesterday    Review of Systems   Constitutional: Positive for malaise/fatigue.   HENT: Negative.     Cardiovascular:  Positive for dyspnea on exertion and palpitations. Negative for chest pain.   Respiratory:  Positive for shortness of breath. Negative for sputum production.    Psychiatric/Behavioral:  Positive for depression.      Objective:     Vital Signs (Most Recent):  Temp: 97.8 °F (36.6 °C) (07/04/24 1530)  Pulse: (!) 132 (07/04/24 1530)  Resp: 20 (07/04/24 1530)  BP: (!) 137/97 (07/04/24 1530)  SpO2: 99 % (07/04/24 1530) Vital Signs (24h Range):  Temp:  [97.8 °F (36.6 °C)-99 °F (37.2 °C)] 97.8 °F (36.6 °C)  Pulse:  [] 132  Resp:  [15-20] 20  SpO2:  [97 %-99 %] 99 %  BP: (109-137)/(68-97) 137/97     Weight: (!) 137 kg (302 lb 0.5 oz)  Body mass index is 47.3 kg/m².     SpO2: 99 %        Physical Exam  Constitutional:       Appearance: She is obese. She is ill-appearing.   HENT:      Head: Normocephalic.      Mouth/Throat:      Mouth: Mucous membranes are moist.   Eyes:      Extraocular Movements: Extraocular movements intact.   Cardiovascular:      Rate and Rhythm: Tachycardia present. Rhythm irregular.      Pulses: Normal pulses.   Pulmonary:      Effort: No respiratory distress.      Breath sounds: Normal breath sounds.   Abdominal:      Palpations: Abdomen is soft.      Tenderness: There is no abdominal tenderness.    Musculoskeletal:      Right lower leg: Edema present.      Left lower leg: Edema present.   Neurological:      Mental Status: She is alert.   Psychiatric:         Mood and Affect: Mood is depressed. Affect is flat.         Behavior: Behavior is withdrawn.            Significant Labs: EP:   Recent Labs   Lab 07/03/24  0727 07/03/24  0809 07/04/24  0528 07/04/24  0808     --   --  137   K 5.0  --   --  4.6     --   --  106   CO2 18*  --   --  18*   *  --   --  133*   BUN 88*  --   --  91*   CREATININE 7.0*  --   --  6.9*   CALCIUM 9.3  --   --  9.2   PROT 6.3  --   --  6.3   ALBUMIN 2.6*  --   --  2.6*   BILITOT 0.8  --   --  0.8   ALKPHOS 101  --   --  97   AST 15  --   --  17   ALT 11  --   --  12   ANIONGAP 12  --   --  13   WBC 9.05 10.33 9.54  9.54  --    HGB 10.9* 10.6* 10.7*  10.7*  --    HCT 36.4* 35.0* 34.8*  34.8*  --     260 284  284  --    INR  --  1.0  --   --     and All pertinent lab results from the last 24 hours have been reviewed.    Significant Imaging: Echocardiogram: Transthoracic echo (TTE) complete (Cupid Only):   Results for orders placed or performed during the hospital encounter of 07/03/24   Echo   Result Value Ref Range    RA Width 4.57 cm    LA volume (mod) 71.24 cm3    Left Atrium Major Axis 6.52 cm    Left Atrium Minor Axis 6.53 cm    RA Major Axis 6.28 cm    LV Diastolic Volume 169.89 mL    LV Systolic Volume 124.41 mL    PV Peak D Harley 0.23 m/s    PV Peak S Harley 0.14 m/s    MV stenosis pressure 1/2 time 53.56 ms    TR Max Harley 3.39 m/s    MV Peak E Harley 1.19 m/s    Mr max harley 0.04 m/s    Ao VTI 19.89 cm    Ao peak harley 1.21 m/s    LVOT peak VTI 14.22 cm    LVOT peak harley 0.89 m/s    LVOT diameter 2.06 cm    IVRT 114.18 msec    E wave deceleration time 184.67 msec    AV mean gradient 3 mmHg    RV- boothe basal diam 4.4 cm    TAPSE 1.29 cm    LA size 4.90 cm    Ascending aorta 3.04 cm    STJ 2.64 cm    Sinus 3.16 cm    LVIDs 5.11 (A) 2.1 - 4.0 cm    Posterior Wall  1.01 0.6 - 1.1 cm    IVS 0.92 0.6 - 1.1 cm    LVIDd 5.85 3.5 - 6.0 cm    TDI LATERAL 0.08 m/s    LA WIDTH 4.78 cm    TDI SEPTAL 0.05 m/s    LV LATERAL E/E' RATIO 14.88 m/s    LV SEPTAL E/E' RATIO 23.80 m/s    FS 13 (A) 28 - 44 %    LA volume 129.90 cm3    LV mass 225.83 g    Left Ventricle Relative Wall Thickness 0.35 cm    AV valve area 2.38 cm²    AV Velocity Ratio 0.74     AV index (prosthetic) 0.71     MV valve area p 1/2 method 4.11 cm2    Mean e' 0.07 m/s    Pulm vein S/D ratio 0.61     LVOT area 3.3 cm2    LVOT stroke volume 47.37 cm3    AV peak gradient 6 mmHg    E/E' ratio 18.31 m/s    Triscuspid Valve Regurgitation Peak Gradient 46 mmHg    MARKO by Velocity Ratio 2.45 cm²    BSA 2.54 m2    LV Systolic Volume Index 51.6 mL/m2    LV Diastolic Volume Index 70.49 mL/m2    LV Mass Index 94 g/m2    LA Volume Index 53.9 mL/m2    LA Volume Index (Mod) 29.6 mL/m2    ZLVIDS -2.13     ZLVIDD -6.48     TV resting pulmonary artery pressure 61 mmHg    RV TB RVSP 18 mmHg    Est. RA pres 15 mmHg    Narrative      Left Ventricle: The left ventricle is moderately dilated. Normal wall   thickness. Global hypokinesis present. There is severely reduced systolic   function with a visually estimated ejection fraction of 20 - 25%.    Right Ventricle: Mild right ventricular enlargement. Wall thickness is   normal. Right ventricle wall motion has global hypokinesis. Systolic   function is moderately reduced.    Left Atrium: Left atrium is severely dilated.    Right Atrium: Right atrium is severely dilated.    Aortic Valve: The aortic valve is a trileaflet valve. There is mild   aortic valve sclerosis.    Mitral Valve: There is mild regurgitation.    Tricuspid Valve: There is severe regurgitation.    Pulmonary Artery: There is moderate pulmonary hypertension. The   estimated pulmonary artery systolic pressure is 61 mmHg.    IVC/SVC: Elevated venous pressure at 15 mmHg.       Assessment and Plan:     Atrial fibrillation with RVR  73F  w/ ESRD, HFrEF (LVEF 25%, no ICD yet), CAD s/p PCI (oLCX, mLCx, and pLAD 2020), paroxysmal Atrial fibrillation, hx RCC s/p R nephrectomy, T2DM obesity, and initial outside hospitalization for PD catheter malfunction and ADHF iso of volume overload complicated by PEA arrest with conversion to VT after epi and cardioversion => ROSC and recurrent AF w/ RVR. Given advanced HF and poor functional status, at this juncture do not believe benefits outweigh risks of ICD    Recommendations:  [ ] Can increase metoprolol to 50mg tartrate q8hrs  [ ] Patient needs to be therapeutically anticoagulated prior to cardioversion  [ ] if still in RVR and therapeutically anticoagulated after volume optimization/dialysis can consider MILLY/DCCV        August Neri MD  Cardiac Electrophysiology  [facility] - Cardiology Stepdown

## 2024-07-04 NOTE — PROGRESS NOTES
[facility] Step[facility]  Nephrology  Progress Note    Patient Name: Juhi Taylor  MRN: [id number]  Admission Date: 7/3/2024  Hospital Length of Stay: 1 days  Attending Provider: Herb Coreas MD   Primary Care Physician: Tony Sadler MD  Principal Problem:Severe sepsis    Subjective:     HPI: 74-year-old female with prior history of CKD stage 5 with recent PD catheter placement on 6/4, RCC s/p right nephrectomy, T2DM, obesity, CAD s/p PCI to Lcx and LAD in 11/2020 and HFmREF who is admitted as a transfer from [facility] for higher level of care and further cardiac evaluation. Nephrology consulted for hemodialysis.     She initially presented on 6/18/24 to [facility] for hypervolemia in the setting of CKD 4-5 for which she had undergone elective PD catheter placement complicated by catheter site leaking. She had a complex hospital course, was initially placed on furosemide gtt without significant improvement and later underwent placement of tunneled line for HD with volume removal. During her second dialysis session she went into PEA arrest for which she was resuscitated, intubated and transferred to ICU. She was noted to have elevated troponin and subsequent TTE found reduced EF from 40-45% to 20-25% for which coronary angiography was recommended. Her hospital course was also complicated by dialysis line infection, significant bleeding around the catheter site, bradycardia and subsequent atrial fibrillation with RVR. Given the catheter site bleeding, anticoagulation was temporarily held. She has reported allergy to amiodarone, was initially placed on diltiazem for atrial fibrillation despite reduced EF. She discussed GOC at outside hospital where it appears that she lacked capacity per note and decided to pursue higher level of care at [facility]. At that time, gen surgery was planning to place a left tunneled catheter due to concerns of the right side not being suitable    Upon admit, patient appeared  drowsy but conversant. Daughter reports that she typically gets lethargic 2/2 to the volume overload due to not receiving dialysis. Patient reported b/l left lower extremity pain. Daughter concerned about her not having dialysis in 4 days and wants a session today.     Interval History: Overnight, patient underwent PD and had 1.2 L removed. Seen this morning. Patient tolerated PD well. No other concerns this morning.    Review of patient's allergies indicates:   Allergen Reactions    Percocet [oxycodone-acetaminophen] Itching    Amiodarone analogues      Itching      Irbesartan Swelling    Januvia [sitagliptin]     Jardiance [empagliflozin]      Leg cramps    Lipitor [atorvastatin] Other (See Comments)     Severe leg pain    Linaclotide Other (See Comments) and Nausea And Vomiting     Does not remember    Lubiprostone Other (See Comments) and Palpitations     Does not remember     Current Facility-Administered Medications   Medication Frequency    ceFEPIme (MAXIPIME) 1 g in D5W 100 mL IVPB (MB+) Q24H    dextromethorphan-guaiFENesin  mg/5 ml liquid 5 mL Q4H PRN    famotidine tablet 20 mg Daily    fluconazole tablet 200 mg Every other day    furosemide (Lasix) 160 mg in 0.9% NaCl 100 mL IVPB Q12H    gentamicin 0.1 % ointment TID    heparin 25,000 units in dextrose 5% (100 units/ml) IV bolus from bag LOW INTENSITY nomogram - [facility] PRN    heparin 25,000 units in dextrose 5% (100 units/ml) IV bolus from bag LOW INTENSITY nomogram - [facility] PRN    heparin 25,000 units in dextrose 5% 250 mL (100 units/mL) infusion LOW INTENSITY nomogram - [facility] Continuous    metoprolol tartrate (LOPRESSOR) tablet 25 mg TID    vancomycin - pharmacy to dose pharmacy to manage frequency       Objective:     Vital Signs (Most Recent):  Temp: 98.2 °F (36.8 °C) (07/04/24 1156)  Pulse: 104 (07/04/24 1156)  Resp: 20 (07/04/24 1156)  BP: (!) 133/92 (07/04/24 1156)  SpO2: 99 % (07/04/24 1156) Vital Signs (24h Range):  Temp:  [97.8 °F (36.6 °C)-99 °F  (37.2 °C)] 98.2 °F (36.8 °C)  Pulse:  [] 104  Resp:  [15-20] 20  SpO2:  [97 %-99 %] 99 %  BP: (109-136)/(68-95) 133/92     Weight: (!) 137 kg (302 lb 0.5 oz) (07/03/24 1422)  Body mass index is 47.3 kg/m².  Body surface area is 2.54 meters squared.    I/O last 3 completed shifts:  In: -   Out: 1782 [Urine:500; Other:1282]     Physical Exam  Constitutional:       General: She is not in acute distress.     Appearance: Normal appearance. She is obese.   HENT:      Head: Normocephalic and atraumatic.      Mouth/Throat:      Mouth: Mucous membranes are moist.      Pharynx: Oropharynx is clear.   Cardiovascular:      Rate and Rhythm: Normal rate and regular rhythm.      Heart sounds: Normal heart sounds.   Pulmonary:      Effort: Pulmonary effort is normal.      Breath sounds: Normal breath sounds.   Abdominal:      Palpations: Abdomen is soft.   Musculoskeletal:      Right lower leg: Edema (2+) present.      Left lower leg: Edema (2+) present.   Skin:     General: Skin is warm and dry.   Neurological:      General: No focal deficit present.      Mental Status: She is alert and oriented to person, place, and time.          Significant Labs:  All labs within the past 24 hours have been reviewed.     Significant Imaging:  All imaging with the past 24 hours have been reviewed.  Assessment/Plan:     Renal/  CKD stage 4 secondary to hypertension  74-year-old female with DMT2, ESRD on HD, pAF, CAD s/p PCI and HFrEF is admitted as a transfer for acute HFrEF in setting of CKD5 and recent PEA arrest during dialysis. Recent PD catheter placement on 6/4 but concerned for a possibly leak from cath. Unable to initiate dialysis for 2 weeks PTA to OSH due to insurance issues. Hospital course complicated by infected tunneled cath removed on 6/30. Transferred here for higher level of care. Initial plan at OSH was to replace tunneled catheter on the left side due to concerns to that right side may not by suitable. Peritoneal fluid  studies negative for peritonitis     Plan:  - Continue daily PD  - recommended IV Lasix 160-200 q12hrs  - Trend sCr  - Trend RFP; replete electrolytes PRN for goal K > 4, Phos > 3, Mg > 2  - Strict I&Os  - Avoid nephrotoxic agents  - Renally adjust medications        Thank you for your consult. I will follow-up with patient. Please contact us if you have any additional questions.    Yonathan Souza DO  Nephrology  [facility]    ATTENDING PHYSICIAN ATTESTATION  I have personally verified the history and examined the patient. I thoroughly reviewed the demographic, clinical, laboratorial and imaging information available in medical records. I agree with the assessment and recommendations provided by the subspecialty resident who was under my supervision.

## 2024-07-04 NOTE — SUBJECTIVE & OBJECTIVE
Interval History: Overnight, patient underwent PD and had 1.2 L removed. Seen this morning. Patient tolerated PD well. No other concerns this morning.    Review of patient's allergies indicates:   Allergen Reactions    Percocet [oxycodone-acetaminophen] Itching    Amiodarone analogues      Itching      Irbesartan Swelling    Januvia [sitagliptin]     Jardiance [empagliflozin]      Leg cramps    Lipitor [atorvastatin] Other (See Comments)     Severe leg pain    Linaclotide Other (See Comments) and Nausea And Vomiting     Does not remember    Lubiprostone Other (See Comments) and Palpitations     Does not remember     Current Facility-Administered Medications   Medication Frequency    ceFEPIme (MAXIPIME) 1 g in D5W 100 mL IVPB (MB+) Q24H    dextromethorphan-guaiFENesin  mg/5 ml liquid 5 mL Q4H PRN    famotidine tablet 20 mg Daily    fluconazole tablet 200 mg Every other day    furosemide (Lasix) 160 mg in 0.9% NaCl 100 mL IVPB Q12H    gentamicin 0.1 % ointment TID    heparin 25,000 units in dextrose 5% (100 units/ml) IV bolus from bag LOW INTENSITY nomogram - [facility] PRN    heparin 25,000 units in dextrose 5% (100 units/ml) IV bolus from bag LOW INTENSITY nomogram - [facility] PRN    heparin 25,000 units in dextrose 5% 250 mL (100 units/mL) infusion LOW INTENSITY nomogram - [facility] Continuous    metoprolol tartrate (LOPRESSOR) tablet 25 mg TID    vancomycin - pharmacy to dose pharmacy to manage frequency       Objective:     Vital Signs (Most Recent):  Temp: 98.2 °F (36.8 °C) (07/04/24 1156)  Pulse: 104 (07/04/24 1156)  Resp: 20 (07/04/24 1156)  BP: (!) 133/92 (07/04/24 1156)  SpO2: 99 % (07/04/24 1156) Vital Signs (24h Range):  Temp:  [97.8 °F (36.6 °C)-99 °F (37.2 °C)] 98.2 °F (36.8 °C)  Pulse:  [] 104  Resp:  [15-20] 20  SpO2:  [97 %-99 %] 99 %  BP: (109-136)/(68-95) 133/92     Weight: (!) 137 kg (302 lb 0.5 oz) (07/03/24 1422)  Body mass index is 47.3 kg/m².  Body surface area is 2.54 meters squared.    I/O last 3  completed shifts:  In: -   Out: 1782 [Urine:500; Other:1282]     Physical Exam  Constitutional:       General: She is not in acute distress.     Appearance: Normal appearance. She is obese.   HENT:      Head: Normocephalic and atraumatic.      Mouth/Throat:      Mouth: Mucous membranes are moist.      Pharynx: Oropharynx is clear.   Cardiovascular:      Rate and Rhythm: Normal rate and regular rhythm.      Heart sounds: Normal heart sounds.   Pulmonary:      Effort: Pulmonary effort is normal.      Breath sounds: Normal breath sounds.   Abdominal:      Palpations: Abdomen is soft.   Musculoskeletal:      Right lower leg: Edema (2+) present.      Left lower leg: Edema (2+) present.   Skin:     General: Skin is warm and dry.   Neurological:      General: No focal deficit present.      Mental Status: She is alert and oriented to person, place, and time.          Significant Labs:  All labs within the past 24 hours have been reviewed.     Significant Imaging:  All imaging with the past 24 hours have been reviewed.

## 2024-07-04 NOTE — ASSESSMENT & PLAN NOTE
74-year-old female with DMT2, ESRD on HD, pAF, CAD s/p PCI and HFrEF is admitted as a transfer for acute HFrEF in setting of CKD5 and recent PEA arrest during dialysis. Recent PD catheter placement on 6/4 but concerned for a possibly leak from cath. Unable to initiate dialysis for 2 weeks PTA to OSH due to insurance issues. Hospital course complicated by infected tunneled cath removed on 6/30. Transferred here for higher level of care. Initial plan at OSH was to replace tunneled catheter on the left side due to concerns to that right side may not by suitable. Peritoneal fluid studies negative for peritonitis     Plan:  - Continue daily PD  - recommended IV Lasix 160-200 q12hrs  - Trend sCr  - Trend RFP; replete electrolytes PRN for goal K > 4, Phos > 3, Mg > 2  - Strict I&Os  - Avoid nephrotoxic agents  - Renally adjust medications

## 2024-07-04 NOTE — SUBJECTIVE & OBJECTIVE
Interval History: Pt more alert and conversational today. Reports feeling improved since yesterday but still feeling mild SOB in bed. Some abdominal discomfort she is attributing to gas.       Objective:     Vital Signs (Most Recent):  Temp: 98.2 °F (36.8 °C) (07/04/24 1156)  Pulse: 95 (07/04/24 1508)  Resp: 20 (07/04/24 1156)  BP: (!) 133/92 (07/04/24 1156)  SpO2: 99 % (07/04/24 1156) Vital Signs (24h Range):  Temp:  [97.8 °F (36.6 °C)-99 °F (37.2 °C)] 98.2 °F (36.8 °C)  Pulse:  [] 95  Resp:  [15-20] 20  SpO2:  [97 %-99 %] 99 %  BP: (109-133)/(68-95) 133/92     Weight: (!) 137 kg (302 lb 0.5 oz)  Body mass index is 47.3 kg/m².    Intake/Output Summary (Last 24 hours) at 7/4/2024 1521  Last data filed at 7/4/2024 0945  Gross per 24 hour   Intake 120 ml   Output 1282 ml   Net -1162 ml         Physical Exam  Vitals and nursing note reviewed.   Constitutional:       General: She is not in acute distress.     Appearance: She is obese. She is ill-appearing.   Cardiovascular:      Rate and Rhythm: Tachycardia present. Rhythm irregular.      Heart sounds: No murmur heard.  Pulmonary:      Effort: No respiratory distress.      Breath sounds: Examination of the right-middle field reveals rales. Examination of the left-middle field reveals rales. Examination of the right-lower field reveals rales. Examination of the left-lower field reveals rales. Decreased breath sounds and rales present.   Abdominal:      Palpations: Abdomen is soft.   Skin:     General: Skin is warm and dry.   Neurological:      General: No focal deficit present.      Mental Status: She is alert and oriented to person, place, and time.             Significant Labs: All pertinent labs within the past 24 hours have been reviewed.  CBC:   Recent Labs   Lab 07/03/24  0727 07/03/24  0809 07/04/24  0528   WBC 9.05 10.33 9.54  9.54   HGB 10.9* 10.6* 10.7*  10.7*   HCT 36.4* 35.0* 34.8*  34.8*    260 284  284     CMP:   Recent Labs   Lab  07/03/24  0727 07/04/24  0808    137   K 5.0 4.6    106   CO2 18* 18*   * 133*   BUN 88* 91*   CREATININE 7.0* 6.9*   CALCIUM 9.3 9.2   PROT 6.3 6.3   ALBUMIN 2.6* 2.6*   BILITOT 0.8 0.8   ALKPHOS 101 97   AST 15 17   ALT 11 12   ANIONGAP 12 13       Significant Imaging: I have reviewed all pertinent imaging results/findings within the past 24 hours.

## 2024-07-04 NOTE — PROGRESS NOTES
07/03/24 2135        Peritoneal Dialysis Catheter 06/04/24 Other Left lower abdomen   Placement Date: 06/04/24   Present Prior to Hospital Arrival?: No  Inserted by: MD  Catheter Type: (c) Other  Catheter Location: Left lower abdomen   Site Assessment Clean;Dry;Intact   Dressing Intervention Integrity maintained   Status Accessed   Dressing Status Clean;Dry;Intact   Dressing Gauze   Securement secured to abdomen with tape   Clamp Status clamped   Peritoneal Dialysis   Exchange Type Cycler   Peritoneal Treatment Status Started   Dianeal Solution Dextrose 1.5% in 6000 mL;Dextrose 1.5% in 2500 mL;Dextrose 1.5% in 2000 mL     Report received from primary RN. PD started per MD order.

## 2024-07-04 NOTE — PROGRESS NOTES
07/04/24 0607   Peritoneal Dialysis   Exchange Type Cycler   Peritoneal Treatment Status Completed   Dianeal Solution Dextrose 1.5% in 6000 mL   Cycler Peritoneal Dialysis   Initial Drain Volume (mL) 75 mL   Effluent Appearance Yellow   Number of Cycles 4   Fill Volume (mL) 1000 mL   Total Volume (mL) 4004 mL   Average Dwell Time (specify hr/min) 62   Cycler PD Total UF (mL) 1282 mL   Cycler Treatment Comments PD completed     PD completed per MD order. Report given to primary RN.

## 2024-07-04 NOTE — PLAN OF CARE
Goals to be met by: 08/04/24     Patient will increase functional independence with ADLs by performing:    UE Dressing with Modified [place].  LE Dressing with Minimal Assistance.  Grooming while standing at sink with Stand-by Assistance.  Toileting from bedside commode with Minimal Assistance for hygiene and clothing management.   Toilet transfer to bedside commode with Supervision.    DME:  Tub transfer bench is required for pt to return to t/s use with shower chair at present unsuitable with need for mobiltiy greater than pt can safely complete at present.     Patient has a mobility limitation that significantly impairs their ability to participate in one or more mobility related activities of daily living, including toileting. This deficit can be resolved by using a bedside commode. Patient demonstrates mobility limitations that will cause them to be confined to one room at home without bathroom access for up to 30 days. Using a bedside commode will greatly improve the patient's ability to participate in MRADLs.     Ambulation needs TBD on progress.

## 2024-07-04 NOTE — ASSESSMENT & PLAN NOTE
Patient with Hypoxic Respiratory failure which is Acute on chronic.  she is not on home oxygen. Supplemental oxygen was provided and noted- 99% 2LNC     .   Signs/symptoms of respiratory failure include- increased work of breathing and lethargy. Contributing diagnoses includes - CHF, Obesity Hypoventilation, and ESRD  Labs and images were reviewed. Patient Has not had a recent ABG. Will treat underlying causes and adjust management of respiratory failure as follows-     - BiPAP night  - ABG  - 200mg IV lasix q12hr  - Low threshold for MICU consult

## 2024-07-04 NOTE — PROGRESS NOTES
[facility] Step[facility]  Cardiology  Progress Note    Patient Name: Juhi Taylor  MRN: [id number]  Admission Date: 7/3/2024  Hospital Length of Stay: 1 days  Code Status: Full Code   Attending Physician: Herb Coreas MD   Primary Care Physician: Tony Sadler MD  Expected Discharge Date: 7/8/2024  Principal Problem:Severe sepsis    Subjective:     Hospital Course:   No notes on file    Interval History: Pt resting in bed this AM with daughter at bedside, PD removed 1.2L yesterday and 500 cc UOP, CBC stable, CMP pending, plans to continue volume removed with lasix and PD    ROS  Objective:     Vital Signs (Most Recent):  Temp: 97.8 °F (36.6 °C) (07/04/24 0857)  Pulse: (!) 144 (07/04/24 0857)  Resp: 20 (07/04/24 0857)  BP: (!) 124/95 (07/04/24 0857)  SpO2: 99 % (07/04/24 0857) Vital Signs (24h Range):  Temp:  [97.8 °F (36.6 °C)-99 °F (37.2 °C)] 97.8 °F (36.6 °C)  Pulse:  [] 144  Resp:  [15-20] 20  SpO2:  [91 %-99 %] 99 %  BP: (109-136)/(68-95) 124/95     Weight: (!) 137 kg (302 lb 0.5 oz)  Body mass index is 47.3 kg/m².     SpO2: 99 %         Intake/Output Summary (Last 24 hours) at 7/4/2024 0857  Last data filed at 7/4/2024 0607  Gross per 24 hour   Intake --   Output 1782 ml   Net -1782 ml       Lines/Drains/Airways       Drain  Duration             Female External Urinary Catheter w/ Suction 07/03/24 0430 1 day              Peripheral Intravenous Line  Duration                  Peripheral IV - Single Lumen 07/03/24 1029 20 G 1 3/4 in No Right Forearm <1 day         Peripheral IV - Single Lumen 07/03/24 1435 20 G 1 3/4 in Anterior;Proximal;Right Forearm <1 day                       Physical Exam  Vitals and nursing note reviewed.   Constitutional:       General: She is not in acute distress.     Appearance: She is obese. She is ill-appearing. She is not toxic-appearing.      Interventions: Nasal cannula in place.   HENT:      Head: Normocephalic and atraumatic.   Eyes:       Extraocular Movements: Extraocular movements intact.      Pupils: Pupils are equal, round, and reactive to light.   Cardiovascular:      Rate and Rhythm: Normal rate. Rhythm irregular.      Pulses:           Radial pulses are 2+ on the right side and 2+ on the left side.      Heart sounds: Heart sounds are distant.      Gallop present. S3 sounds present.   Pulmonary:      Effort: Pulmonary effort is normal. No respiratory distress.      Breath sounds: No stridor. No wheezing or rhonchi.   Abdominal:      General: There is no distension.      Palpations: Abdomen is soft.      Tenderness: There is no abdominal tenderness. There is no guarding.   Musculoskeletal:      Cervical back: Normal range of motion and neck supple.      Right lower leg: 3+ Pitting Edema present.      Left lower leg: 3+ Pitting Edema present.   Skin:     General: Skin is cool.   Neurological:      General: No focal deficit present.      Mental Status: She is lethargic.      Motor: Weakness present.      Gait: Gait abnormal.   Psychiatric:         Mood and Affect: Mood normal.         Behavior: Behavior normal. Behavior is cooperative.            Significant Labs: CBC   Recent Labs   Lab 07/03/24  0727 07/03/24  0809 07/04/24  0528   WBC 9.05 10.33 9.54  9.54   HGB 10.9* 10.6* 10.7*  10.7*   HCT 36.4* 35.0* 34.8*  34.8*    260 284  284       Significant Imaging: Echocardiogram: Transthoracic echo (TTE) complete (Cupid Only):   Results for orders placed or performed during the hospital encounter of 07/03/24   Echo   Result Value Ref Range    RA Width 4.57 cm    LA volume (mod) 71.24 cm3    Left Atrium Major Axis 6.52 cm    Left Atrium Minor Axis 6.53 cm    RA Major Axis 6.28 cm    LV Diastolic Volume 169.89 mL    LV Systolic Volume 124.41 mL    PV Peak D Harley 0.23 m/s    PV Peak S Harley 0.14 m/s    MV stenosis pressure 1/2 time 53.56 ms    TR Max Harley 3.39 m/s    MV Peak E Harley 1.19 m/s    Mr max harley 0.04 m/s    Ao VTI 19.89 cm    Ao peak harley  1.21 m/s    LVOT peak VTI 14.22 cm    LVOT peak rigo 0.89 m/s    LVOT diameter 2.06 cm    IVRT 114.18 msec    E wave deceleration time 184.67 msec    AV mean gradient 3 mmHg    RV- boothe basal diam 4.4 cm    TAPSE 1.29 cm    LA size 4.90 cm    Ascending aorta 3.04 cm    STJ 2.64 cm    Sinus 3.16 cm    LVIDs 5.11 (A) 2.1 - 4.0 cm    Posterior Wall 1.01 0.6 - 1.1 cm    IVS 0.92 0.6 - 1.1 cm    LVIDd 5.85 3.5 - 6.0 cm    TDI LATERAL 0.08 m/s    LA WIDTH 4.78 cm    TDI SEPTAL 0.05 m/s    LV LATERAL E/E' RATIO 14.88 m/s    LV SEPTAL E/E' RATIO 23.80 m/s    FS 13 (A) 28 - 44 %    LA volume 129.90 cm3    LV mass 225.83 g    Left Ventricle Relative Wall Thickness 0.35 cm    AV valve area 2.38 cm²    AV Velocity Ratio 0.74     AV index (prosthetic) 0.71     MV valve area p 1/2 method 4.11 cm2    Mean e' 0.07 m/s    Pulm vein S/D ratio 0.61     LVOT area 3.3 cm2    LVOT stroke volume 47.37 cm3    AV peak gradient 6 mmHg    E/E' ratio 18.31 m/s    Triscuspid Valve Regurgitation Peak Gradient 46 mmHg    MARKO by Velocity Ratio 2.45 cm²    BSA 2.54 m2    LV Systolic Volume Index 51.6 mL/m2    LV Diastolic Volume Index 70.49 mL/m2    LV Mass Index 94 g/m2    LA Volume Index 53.9 mL/m2    LA Volume Index (Mod) 29.6 mL/m2    ZLVIDS -2.13     ZLVIDD -6.48     TV resting pulmonary artery pressure 61 mmHg    RV TB RVSP 18 mmHg    Est. RA pres 15 mmHg    Narrative      Left Ventricle: The left ventricle is moderately dilated. Normal wall   thickness. Global hypokinesis present. There is severely reduced systolic   function with a visually estimated ejection fraction of 20 - 25%.    Right Ventricle: Mild right ventricular enlargement. Wall thickness is   normal. Right ventricle wall motion has global hypokinesis. Systolic   function is moderately reduced.    Left Atrium: Left atrium is severely dilated.    Right Atrium: Right atrium is severely dilated.    Aortic Valve: The aortic valve is a trileaflet valve. There is mild   aortic valve  sclerosis.    Mitral Valve: There is mild regurgitation.    Tricuspid Valve: There is severe regurgitation.    Pulmonary Artery: There is moderate pulmonary hypertension. The   estimated pulmonary artery systolic pressure is 61 mmHg.    IVC/SVC: Elevated venous pressure at 15 mmHg.       Assessment and Plan:       Atrial fibrillation with RVR  Patient with Paroxysmal (<7 days) atrial fibrillation which is controlled currently with Beta Blocker. Patient is currently in atrial fibrillation.[id number] Score: 4. Anticoagulation indicated. Anticoagulation done with heparin .    Recommendations:  - EP following, continue lopressor TID and up-titrate if needed  - goal HR < 110 bpm  - continue anticoagulation, currently on heparin  - monitor electrolytes, currently on HD  - diltiazem contraindicated in setting of reduced EF    Acute on chronic combined systolic and diastolic heart failure  74-year-old female with DMT2, ESRD on HD, pAF, CAD s/p PCI and HFrEF is admitted as a transfer for acute HFrEF in setting of CKD5 and recent PEA arrest during dialysis. PEA likely due to complications related to dialysis.    Recommendations:  - optimize volume status with HD per nephrology  - will reassess status post dialysis for further GDMT   - not a candidate for SGLT2 due to HD   - consider adding ACE/ARB/ARNI if BP allows   - EP following for Afib, controlled on telemetry, continue lopressor TID and up-titrate if needed   - consider increasing lasix dose, 500 cc UOP on lasix 40 IV BID   - Once volume status optimized per PD and diuretics, consider interventional consult for LHC if ischemic workup needed  - will defer angiography at this time, will consider ischemic workup with potential PET stress  - will assess need for CRT-D when stabilized, likely in outpatient setting        VTE Risk Mitigation (From admission, onward)           Ordered     heparin 25,000 units in dextrose 5% (100 units/ml) IV bolus from bag LOW INTENSITY  nomogram - [facility]  As needed (PRN)        Question:  Heparin Infusion Adjustment (DO NOT MODIFY ANSWER)  Answer:  [email or web address]\Images\Pharmacy\HeparinInfusions\heparin LOW INTENSITY nomogram for [facility] [id number]    07/03/24 0727     heparin 25,000 units in dextrose 5% (100 units/ml) IV bolus from bag LOW INTENSITY nomogram - [facility]  As needed (PRN)        Question:  Heparin Infusion Adjustment (DO NOT MODIFY ANSWER)  Answer:  [email or web address]\epic\Images\Pharmacy\HeparinInfusions\heparin LOW INTENSITY nomogram for [facility] [id number]    07/03/24 0727     heparin 25,000 units in dextrose 5% 250 mL (100 units/mL) infusion LOW INTENSITY nomogram - [facility]  Continuous        Question:  Begin at (units/kg/hr)  Answer:  12    07/03/24 0727     Place sequential compression device  Until discontinued         07/03/24 7659                  Thank you for the consult, cardiology will sign off at this time. Please don't hesitate and reach out with any questions.     Robert Carlton MD  Cardiology  [facility] - [facility] Step[facility]

## 2024-07-04 NOTE — PLAN OF CARE
Problem: Physical Therapy  Goal: Physical Therapy Goal  Description: Goals to be met by: 24     Patient will increase functional independence with mobility by performin. Supine to sit with Contact Guard Assistance  2. Sit to stand transfer with Contact Guard Assistance with RW  3. Bed to chair transfer with Contact Guard Assistance using Rolling Walker  4. Gait  x 50 feet with Contact Guard Assistance using Rolling Walker.     Outcome: Progressing   Evaluation completed and goals appropriate. 2024

## 2024-07-04 NOTE — PT/OT/SLP EVAL
Physical Therapy Co-Evaluation and treatment with OT    Patient Name:  Juhi Taylor   MRN:  [id number]    Recommendations:     Discharge Recommendations: Moderate Intensity Therapy   Discharge Equipment Recommendations: none   Barriers to discharge: Decreased caregiver support    Assessment:     Juhi Taylor is a 74 y.o. female admitted with a medical diagnosis of Severe sepsis.  She presents with the following impairments/functional limitations: impaired endurance, impaired functional mobility, gait instability, impaired balance, decreased safety awareness pt tolerated treatment well but is significantly below previous functional level. Pt will benefit from skilled PT 4x/wk to progress physically.  Pt is significantly below previous functional level, increased risk of falls and increased burden of care currently. Patient currently demonstrates a need for moderate intensity therapy on a daily basis post acute secondary to a decline in functional status due to disease pt was transferred from [facility] where she presented to the ED with SOB on 6/18. Pt is s/p PEA arrest 6/24/24.    Rehab Prognosis: Good; patient would benefit from acute skilled PT services to address these deficits and reach maximum level of function.    Recent Surgery: * No surgery found *      Plan:     During this hospitalization, patient to be seen 4 x/week to address the identified rehab impairments via gait training, therapeutic activities and progress toward the following goals:    Plan of Care Expires:  08/02/24    Subjective     Chief Complaint: pt stated that she felt weak.   Patient/Family Comments/goals: to get better and go home.   Pain/Comfort:  Pain Rating 1: 0/10  Pain Rating Post-Intervention 1: 0/10    Patients cultural, spiritual, Mandaeism conflicts given the current situation: no    Living Environment:  Pt lives with her daughter 18 and 20 yo grandsons in [address] [facility].   Prior to admission, patients level of  function was modified independent with rollator.  Equipment used at home: rollator, shower chair, oxygen (hurrycane).  DME owned (not currently used): none.  Upon discharge, patient will have assistance from family .    Objective:     Communicated with nurse  prior to session.  Patient found supine with oxygen, telemetry, PureWick, peripheral IV (avasys camera)  upon PT entry to room.    General Precautions: Standard, fall  Orthopedic Precautions:    Braces:    Respiratory Status: Nasal cannula, flow 2 L/min    Exams:  Cognitive Exam:  Patient is oriented to Person, Place, Time, and Situation. Pt needed prompt for place.   RLE ROM: WFL  RLE Strength: 3/5 for major muscle groups  LLE ROM: WFL  LLE Strength: 3/5 for major muscle groups    Functional Mobility:  Bed Mobility:   pt needed verbal cues for hand placement and sequencing for functional mobility.   Rolling Right: minimum assistance  Supine to Sit: minimum assistance head of bed elevated.   Sit to Supine: maximal assistance    Transfers:     Sit to Stand:  moderate assistance and of 2 persons with rolling walker    Gait: pt received gait training ~ 2 small side steps to head of bed with O2 intact, RW and min assist.     Balance: pt sat on EOB with SBA and performed ADLS with OT    Due to pt complex medical condition, the skill of 2 licensed therapists is needed to maximize treatment session and progression towards goals  Pt white board updated with current therapists name and level of mobility assistance needed.           AM-PAC 6 CLICK MOBILITY  Total Score:12       Treatment & Education:  Pt received verbal instructions in role of PT and POC. Pt verbally expressed understanding of such.     Patient left supine with all lines intact and call button in reach.    GOALS:   Multidisciplinary Problems       Physical Therapy Goals          Problem: Physical Therapy    Goal Priority Disciplines Outcome Goal Variances Interventions   Physical Therapy Goal     PT,  PT/OT Progressing     Description: Goals to be met by: 24     Patient will increase functional independence with mobility by performin. Supine to sit with Contact Guard Assistance  2. Sit to stand transfer with Contact Guard Assistance with RW  3. Bed to chair transfer with Contact Guard Assistance using Rolling Walker  4. Gait  x 50 feet with Contact Guard Assistance using Rolling Walker.                          History:     Past Medical History:   Diagnosis Date    Anticoagulant long-term use     Arthritis     Breast cancer     invasive lobular carcinoma    Cancer of kidney 2020    RIGHT KIDNEY CANCER    CHF (congestive heart failure)     Coronary artery disease dx     Depression     Diabetes mellitus     Diastolic heart failure secondary to hypertension     Gout     Hyperlipemia     Hypertension     Hypertrophy of nasal turbinates     Kidney mass     Right    Levoscoliosis     Lung nodule     left    Multiple thyroid nodules     NICOLE (obstructive sleep apnea)     uses C-PAP    Pulmonary hypertension        Past Surgical History:   Procedure Laterality Date    AORTOGRAPHY N/A 2020    Procedure: Aortogram;  Surgeon: Paul Pedersen MD;  Location: [facility] CATH;  Service: Cardiology;  Laterality: N/A;    BREAST SURGERY      CARDIAC CATHETERIZATION  2020    CHOLECYSTECTOMY      COLONOSCOPY      multi -last      CORONARY ARTERY BYPASS GRAFT      ESOPHAGOGASTRODUODENOSCOPY      2012     HAND SURGERY Right     INSERTION OF CATHETER N/A 2024    Procedure: Insertion,catheter;  Surgeon: Meli Griffin MD;  Location: [facility] OR;  Service: Vascular;  Laterality: N/A;  ORIGINAL CATHETER WAS REPOSITIONED.  NO NEW CATHETER WAS PLACED    INSERTION, CATHETER, DIALYSIS, PERITONEAL N/A 2024    Procedure: IN Insertion Catheter, Dialysis, Peritoneal - laparoscopic;  Surgeon: Rodriguez Catalan Jr., MD;  Location: [facility] OR;  Service: General;  Laterality: N/A;    LAPAROSCOPIC  "ROBOT-ASSISTED SURGICAL REMOVAL OF KIDNEY USING DA CHELLE XI Right 03/10/2022    Procedure: XI ROBOTIC NEPHRECTOMY- radical;  Surgeon: Rolando Ramirez MD;  Location: [facility];  Service: Urology;  Laterality: Right;    MASTECTOMY W/ SENTINEL NODE BIOPSY Bilateral 01/21/2015    bilateral "dog ears"    NASAL SINUS SURGERY  2015    Dr Bryant FESS/cauterization turbinate     PARTIAL HYSTERECTOMY  1989    PERCUTANEOUS TRANSLUMINAL BALLOON ANGIOPLASTY OF CORONARY ARTERY  12/04/2020    Procedure: Angioplasty-coronary;  Surgeon: Paul Pedersen MD;  Location: [facility] CATH;  Service: Cardiology;;    RENAL BIOPSY Right     9/20/2021 EJ    TUBAL LIGATION      ULTRASOUND GUIDANCE  12/04/2020    Procedure: Ultrasound Guidance;  Surgeon: Paul Pedersen MD;  Location: [facility] CATH;  Service: Cardiology;;       Time Tracking:     PT Received On: 07/04/24  PT Start Time: 0935     PT Stop Time: 1003  PT Total Time (min): 28 min     Billable Minutes: Evaluation 8 min  and Therapeutic Activity 20 min       07/04/2024  "

## 2024-07-04 NOTE — PROGRESS NOTES
"[facility] - Cardiology [facility] Medicine  Progress Note    Patient Name: Juhi Taylor  MRN: [id number]  Patient Class: IP- Inpatient   Admission Date: 7/3/2024  Length of Stay: 1 days  Attending Physician: Herb Coreas MD  Primary Care Provider: Tony Sadler MD        Subjective:     Principal Problem:Hypoxic respiratory failure        HPI:  Ms. Taylor is a 75yo female with a PMHx of CAD, ESRD on PD, HFrEF of 40%, and afib on eliquis who was [place] [facility] from [facility] for a higher level of care. She originally presented to [facility] ED on 6/18 for worsening SOB and was found to be satting 89% on RA determined to be due to a heart failure exacerbation.  She was given lasix gtt and metolazone but still produced insufficient urine. HD was started, but during a dialysis session on 6/24 she suffered PEA cardiac arrest, epi was administered and she converted to V tach. After cardioversion ROSC was achieved and she was intubated and transferred to the ICU. Repeat Echo revealed an EF of 20-25% from 40% on admission but cardiology did not intervene at this time to due to her clinical condition but recommended later evaluation for ICD and angiogram. Her condition improved with further HD. She was extubated and moved to the floor but developed an uptrending leukocytosis possibly originating from a left trialysis catheter with bruising and possible purulent discharge. The line was removed. She was determined to not be a candidate for hospice. The family requested transfer to [facility] for a higher level of care. On admission she was comfortable but minimal further history could be obtained due to patient's frustration. See  note below:       "Ms. Juhi Taylor is a 74 y.o. female with afib on Eliquis, CAD, ESRD on PD, CHF EF40%, and morbid obesity who initailly presented to the [facility] ED on 6/18 for evaluation of shortness of breath.  She had a PD catheter inserted at the beginning of June to " start PD, but because of insurance issues, her PD got delayed.  Since that time, she developed worsening SOB and weight gain.  Because her symptoms continued to worsen, she went to the ER.     Upon arrival to the ER, she was found to be satting 89% on room air.  Labs showed WBC 10, Hgb 12.5, Creatinine 4.1 and BNP 1,726.  She was started on a Lasix gtt and Nephro was consulted.  She failed to have adequate urine output despite Lasix gtt and Metolazone, so she was started on HD.  On 6/24, she got PEA cardiac arrest during dialysis.  After giving Epinephrine, the rhythm appeared to change to ventricular tachycardia.  She was cardioverted and achieved ROSC.  She was intubated and transferred to ICU on an Amiodarone gtt, which was stopped because of patient intolerance.  She was found to have a significant Troponin and Cardiology was consulted.  Echo showed a drop in EF to 20-25% from 40% prior to admission.  Cardiology decided against acute intervention because of her clinical condition, but suggested an ICD and angiogram once improved.  HD was continued and and she was able to be extubated and SD to the floor.  She was noted to have an uptrending leukocytosis. Left trailysis cathter had bruising around and possible purulent collection and the line was removed. Blood culture and tip culture have been NGTD.  She is currently on Cefepime and Vancomycin.   She then developed AFib with RVR.  She was started on Sotalol by Cardiology, which patient then refused to take once reading the ADRs.  She was then started on a Dilt gtt (with her reduced EF) and Metoprolol.  She has been seen by Palliative Care, and they are not ready for hospice.  Patient and family are not happy with the care at [facility].  They are [place] [facility] [place].  She will [place] [facility] [place] EP, Cardiology, Nephrology and IR evaluations.     Consult EP for afib with RVR/ICD eval  Consult Cardiology for angiogram  Consult Nephro for HD  Consult  "IR/Interventional Nephro for HD line placement"          Overview/Hospital Course:  Diltiazem gtt discontinued due to reduced EF and metoprolol continued. She was initiated on a heparin drip for thromboembolism prophylaxis. PD catheter originally placed 6/4 still present. Nephrology, cardiology, and electrophysiology consulted.     Nephrology opted to trial dialysis using the PD catheter to assess function and concern for catheter leakage before placing new trialysis line. Patient underwent successful trial of PD with net -800mL. Plan to continue PD nightly and assess volume status. IV lasix added after pt reporting mild/moderate shortness of breath at rest and remaining volume overloaded.     Cardiology recs to continue metoprolol for rate control. Need fluid optimization before further cardiac studies vs cath.     Interval History: Pt more alert and conversational today. Reports feeling improved since yesterday but still feeling mild SOB in bed. Some abdominal discomfort she is attributing to gas.       Objective:     Vital Signs (Most Recent):  Temp: 98.2 °F (36.8 °C) (07/04/24 1156)  Pulse: 95 (07/04/24 1508)  Resp: 20 (07/04/24 1156)  BP: (!) 133/92 (07/04/24 1156)  SpO2: 99 % (07/04/24 1156) Vital Signs (24h Range):  Temp:  [97.8 °F (36.6 °C)-99 °F (37.2 °C)] 98.2 °F (36.8 °C)  Pulse:  [] 95  Resp:  [15-20] 20  SpO2:  [97 %-99 %] 99 %  BP: (109-133)/(68-95) 133/92     Weight: (!) 137 kg (302 lb 0.5 oz)  Body mass index is 47.3 kg/m².    Intake/Output Summary (Last 24 hours) at 7/4/2024 1521  Last data filed at 7/4/2024 0945  Gross per 24 hour   Intake 120 ml   Output 1282 ml   Net -1162 ml         Physical Exam  Vitals and nursing note reviewed.   Constitutional:       General: She is not in acute distress.     Appearance: She is obese. She is ill-appearing.   Cardiovascular:      Rate and Rhythm: Tachycardia present. Rhythm irregular.      Heart sounds: No murmur heard.  Pulmonary:      Effort: No " respiratory distress.      Breath sounds: Examination of the right-middle field reveals rales. Examination of the left-middle field reveals rales. Examination of the right-lower field reveals rales. Examination of the left-lower field reveals rales. Decreased breath sounds and rales present.   Abdominal:      Palpations: Abdomen is soft.   Skin:     General: Skin is warm and dry.   Neurological:      General: No focal deficit present.      Mental Status: She is alert and oriented to person, place, and time.             Significant Labs: All pertinent labs within the past 24 hours have been reviewed.  CBC:   Recent Labs   Lab 07/03/24 0727 07/03/24  0809 07/04/24  0528   WBC 9.05 10.33 9.54  9.54   HGB 10.9* 10.6* 10.7*  10.7*   HCT 36.4* 35.0* 34.8*  34.8*    260 284  284     CMP:   Recent Labs   Lab 07/03/24 0727 07/04/24  0808    137   K 5.0 4.6    106   CO2 18* 18*   * 133*   BUN 88* 91*   CREATININE 7.0* 6.9*   CALCIUM 9.3 9.2   PROT 6.3 6.3   ALBUMIN 2.6* 2.6*   BILITOT 0.8 0.8   ALKPHOS 101 97   AST 15 17   ALT 11 12   ANIONGAP 12 13       Significant Imaging: I have reviewed all pertinent imaging results/findings within the past 24 hours.    Assessment/Plan:      * Hypoxic respiratory failure  73F w/ ESRD hx RCC s/p R nephrectomy, HFrEF, CAD s/p PCI, paroxysmal Atrial fibrillation, T2DM obesity, and initial outside hospitalization for PD catheter malfunction and ADHF iso of volume overload complicated by PEA arrest with conversion to VT after epi and cardioversion => ROSC and recurrent AF w/ RVR.    Respiratory failure precipitated by volume overload 2/2 failed dialysis access and HFrEF complicated by PEA arrest during dialysis session at [facility]. EF following arrest 20%, previously 40%. Echo done at [facility] remains 20% with venous pressure 15mmHg. PD catheter tested by nephrology with success, currently on nightly PD. New trialysis line if volume status not adequately corrected with  "PD.          Patient with Hypoxic Respiratory failure which is Acute on chronic.  she is not on home oxygen. Supplemental oxygen was provided and noted- 99% 2LNC     .   Signs/symptoms of respiratory failure include- increased work of breathing and lethargy. Contributing diagnoses includes - CHF, Obesity Hypoventilation, and ESRD  Labs and images were reviewed. Patient Has not had a recent ABG. Will treat underlying causes and adjust management of respiratory failure as follows-     - BiPAP night  - ABG  - 200mg IV lasix q12hr  - Low threshold for MICU consult    CKD stage 4 secondary to hypertension  Tunneled line removed due to possible infection.  BMP reviewed- noted Estimated Creatinine Clearance: 10.7 mL/min (A) (based on SCr of 6.9 mg/dL (H)). according to latest data. Based on current GFR, CKD stage is stage 5 - GFR < 15.  Monitor UOP and serial BMP and adjust therapy as needed. Renally dose meds. Avoid nephrotoxic medications and procedures.    -nightly PD per nephrology  -HD line if PD not adequately correcting volume  -200mg IV lasix q12hr  -fluid restriction 1.5L  -strict I/Os      Atrial fibrillation with RVR  Patient with Paroxysmal (<7 days) atrial fibrillation which is controlled currently with  diltiazem . Patient is currently in sinus rhythm.[id number] Score: 4. Pt. Has refused eliquis. Currently on heparin drip.    -cards & EP following, appreciate recs  -continue heparin gtt anticoagulation  -metoprolol succinate 25mg TID, will increase to 50mg if BP tolerating  -Cardiology to consider cardioversion w/ MILLY, pacemaker once euvolemic and pending ischemia eval      Acute on chronic combined systolic and diastolic heart failure  Prior to admission to [facility], Echo 6/19/24 w/ EF 40-45% w/ venous pressure 15mmHg. Following PEA arrest repeat echo 6/24 EF 20-25% with [facility] echo confirming reduced EF. At [facility] noted "Patient had significant elevation of troponin cardiology consulted. No acute intervention " "recommended because of clinical condition and later patient is not keen to do the angiogram was stress test."        -Cardiology consulted, appreciate recs  -Diuresis w/ 200mg IV lasix q12hr  -Periodic echo  -Metoprolol 50mg TID  -Add ACE/ARB/ARNI if BP allows            Severe sepsis  Prior HD catheter at [facility] removed due to concern for infection. Blood cultures and catheter tip cultures NGTD. Peritoneal fluid sent for culture. Gram stain negative. Initial leukocytosis resolving. Treating empirically with vancomycin and cefepime. Coating PD catheter with gentamicin ointment.       This patient does have evidence of infective focus  My overall impression is sepsis.  Source:  tunneled catheter - since been pulled  Antibiotics given-   Antibiotics (72h ago, onward)      Start     Stop Route Frequency Ordered    07/03/24 2100  ceFEPIme (MAXIPIME) 1 g in D5W 100 mL IVPB (MB+)         -- IV Every 24 hours (non-standard times) 07/03/24 0914    07/03/24 1500  gentamicin 0.1 % ointment         -- Top 3 times daily 07/03/24 1205    07/03/24 0559  vancomycin - pharmacy to dose  (vancomycin IVPB (PEDS and ADULTS))        Placed in "And" Linked Group    -- IV pharmacy to manage frequency 07/03/24 0459          Latest lactate reviewed-  No results for input(s): "LACTATE", "POCLAC" in the last 72 hours.  Organ dysfunction indicated by Acute kidney injury    Fluid challenge Not needed - patient is not hypotensive      Post- resuscitation assessment No - Post resuscitation assessment not needed       Will Not start Pressors- Levophed for MAP of 65  Source control achieved by: pulling tunneled catheter    Follow up on blood cultures  On cefepime and vancomycin    Type 2 diabetes mellitus with microalbuminuria, without long-term current use of insulin  Last HgbA1c 6.8. Hold hypoglycemic medications while admitted.    Q4h glucose checks  Consider SSI as needed      Essential hypertension  Chronic, controlled. Latest blood pressure and " vitals reviewed-     Temp:  [97.6 °F (36.4 °C)-98.6 °F (37 °C)]   Pulse:  []   Resp:  [19-35]   BP: ()/(55-97)   SpO2:  [88 %-100 %] .   Home meds for hypertension were reviewed and noted below.   Hypertension Medications               amLODIPine (NORVASC) 10 MG tablet Take 10 mg by mouth once daily.    carvediloL (COREG) 25 MG tablet Take 1 tablet (25 mg total) by mouth 2 (two) times daily.    cloNIDine (CATAPRES) 0.1 MG tablet Take 1 tablet (0.1 mg total) by mouth 3 (three) times daily as needed (PRN SBP > 165 mmHg).    cloNIDine 0.1 mg/24 hr td ptwk (CATAPRES) 0.1 mg/24 hr Place 1 patch onto the skin every 7 days.    furosemide (LASIX) 40 MG tablet Take 40 mg by mouth once daily.    nitroGLYCERIN (NITROSTAT) 0.4 MG SL tablet Place 1 tablet (0.4 mg total) under the tongue every 5 (five) minutes as needed for Chest pain.            While in the hospital, will manage blood pressure as follows; Adjust home antihypertensive regimen as follows- only metoprolol tartrate for now. Observe and resume home meds as needed.    Will utilize p.r.n. blood pressure medication only if patient's blood pressure greater than 180/110 and she develops symptoms such as worsening chest pain or shortness of breath.      VTE Risk Mitigation (From admission, onward)           Ordered     heparin 25,000 units in dextrose 5% (100 units/ml) IV bolus from bag LOW INTENSITY nomogram - [facility]  As needed (PRN)        Question:  Heparin Infusion Adjustment (DO NOT MODIFY ANSWER)  Answer:  [email or web address]\Images\Pharmacy\HeparinInfusions\heparin LOW INTENSITY nomogram for [facility] [id number]    07/03/24 0727     heparin 25,000 units in dextrose 5% (100 units/ml) IV bolus from bag LOW INTENSITY nomogram - [facility]  As needed (PRN)        Question:  Heparin Infusion Adjustment (DO NOT MODIFY ANSWER)  Answer:  [email or web address]\epic\Images\Pharmacy\HeparinInfusions\heparin LOW INTENSITY nomogram for [facility] [id number]    07/03/24 0727     heparin 25,663  units in dextrose 5% 250 mL (100 units/mL) infusion LOW INTENSITY nomogram - [facility]  Continuous        Question:  Begin at (units/kg/hr)  Answer:  12    07/03/24 0727     Place sequential compression device  Until discontinued         07/03/24 0450                    Discharge Planning   HARINI: 7/8/2024     Code Status: Full Code   Is the patient medically ready for discharge?:     Reason for patient still in hospital (select all that apply): Patient unstable, Patient trending condition, Laboratory test, Treatment, Imaging, and Consult recommendations               Grant Jean MD  Department of Hospital Medicine   [facility] - Cardiology Stepdown

## 2024-07-04 NOTE — NURSING
Patient not eating her dinner stated she had no appetite, she was having nausea. Zofran and simethicone PRN given.

## 2024-07-04 NOTE — ASSESSMENT & PLAN NOTE
"Prior HD catheter at [facility] removed due to concern for infection. Blood cultures and catheter tip cultures NGTD. Peritoneal fluid sent for culture. Gram stain negative. Initial leukocytosis resolving. Treating empirically with vancomycin and cefepime. Coating PD catheter with gentamicin ointment.       This patient does have evidence of infective focus  My overall impression is sepsis.  Source:  tunneled catheter - since been pulled  Antibiotics given-   Antibiotics (72h ago, onward)      Start     Stop Route Frequency Ordered    07/03/24 2100  ceFEPIme (MAXIPIME) 1 g in D5W 100 mL IVPB (MB+)         -- IV Every 24 hours (non-standard times) 07/03/24 0914    07/03/24 1500  gentamicin 0.1 % ointment         -- Top 3 times daily 07/03/24 1205    07/03/24 0559  vancomycin - pharmacy to dose  (vancomycin IVPB (PEDS and ADULTS))        Placed in "And" Linked Group    -- IV pharmacy to manage frequency 07/03/24 0459          Latest lactate reviewed-  No results for input(s): "LACTATE", "POCLAC" in the last 72 hours.  Organ dysfunction indicated by Acute kidney injury    Fluid challenge Not needed - patient is not hypotensive      Post- resuscitation assessment No - Post resuscitation assessment not needed       Will Not start Pressors- Levophed for MAP of 65  Source control achieved by: pulling tunneled catheter    Follow up on blood cultures  On cefepime and vancomycin  "

## 2024-07-04 NOTE — ASSESSMENT & PLAN NOTE
Patient with Paroxysmal (<7 days) atrial fibrillation which is controlled currently with Beta Blocker. Patient is currently in atrial fibrillation.[id number] Score: 4. Anticoagulation indicated. Anticoagulation done with heparin .    Recommendations:  - EP following, continue lopressor TID and up-titrate if needed  - goal HR < 110 bpm  - continue anticoagulation, currently on heparin  - monitor electrolytes, currently on HD  - diltiazem contraindicated in setting of reduced EF

## 2024-07-04 NOTE — ASSESSMENT & PLAN NOTE
73F w/ ESRD, HFrEF (LVEF 25%, no ICD yet), CAD s/p PCI (oLCX, mLCx, and pLAD 2020), paroxysmal Atrial fibrillation, hx RCC s/p R nephrectomy, T2DM obesity, and initial outside hospitalization for PD catheter malfunction and ADHF iso of volume overload complicated by PEA arrest with conversion to VT after epi and cardioversion => ROSC and recurrent AF w/ RVR.    Recommendations:  [ ] Can increase metoprolol as bp tolerates to maintain rate control  [ ] Patient needs to be therapeutically anticoagulated prior to cardioversion  [ ] if still in RVR and therapeutically anticoagulated after volume optimization/dialysis can consider MILLY/DCCV  [ ] Decision re: CRT-D pending ischemic evaluation, re-evaluation of LV function when optimized.

## 2024-07-04 NOTE — ASSESSMENT & PLAN NOTE
"Prior to admission to [facility], Echo 6/19/24 w/ EF 40-45% w/ venous pressure 15mmHg. Following PEA arrest repeat echo 6/24 EF 20-25% with [facility] echo confirming reduced EF. At [facility] noted "Patient had significant elevation of troponin cardiology consulted. No acute intervention recommended because of clinical condition and later patient is not keen to do the angiogram was stress test."        -Cardiology consulted, appreciate recs  -Diuresis w/ 200mg IV lasix q12hr  -Periodic echo  -Metoprolol 50mg TID  -Add ACE/ARB/ARNI if BP allows          "

## 2024-07-04 NOTE — ASSESSMENT & PLAN NOTE
73F w/ ESRD, HFrEF (LVEF 25%, no ICD yet), CAD s/p PCI (oLCX, mLCx, and pLAD 2020), paroxysmal Atrial fibrillation, hx RCC s/p R nephrectomy, T2DM obesity, and initial outside hospitalization for PD catheter malfunction and ADHF iso of volume overload complicated by PEA arrest with conversion to VT after epi and cardioversion => ROSC and recurrent AF w/ RVR. Given advanced HF and poor functional status, at this juncture do not believe benefits outweigh risks of ICD    Recommendations:  [ ] Can increase metoprolol to 50mg tartrate q8hrs  [ ] Patient needs to be therapeutically anticoagulated prior to cardioversion  [ ] if still in RVR and therapeutically anticoagulated after volume optimization/dialysis can consider MILLY/DCCV

## 2024-07-04 NOTE — PROGRESS NOTES
[facility] - Cardiology Stepdown  Cardiac Electrophysiology  Progress Note    Admission Date: 7/3/2024  Code Status: Full Code   Attending Physician: Herb Coreas MD   Expected Discharge Date: 7/8/2024  Principal Problem:Severe sepsis    Interval Events:  - heparin not running this morning while I was at bedside  - metoprolol increased to 25mg TID  - Afib rate controlled on tele  - Patient reporting depressive symptoms and despondency related to reduced QOL and poor functional status  - PD overnight with 1.2L of fluid removed, 0.5L of urine made yesterday    Subjective:     No new subjective & objective note has been filed under this hospital service since the last note was generated.      Assessment and Plan:     Atrial fibrillation with RVR  73F w/ ESRD, HFrEF (LVEF 25%, no ICD yet), CAD s/p PCI (oLCX, mLCx, and pLAD 2020), paroxysmal Atrial fibrillation, hx RCC s/p R nephrectomy, T2DM obesity, and initial outside hospitalization for PD catheter malfunction and ADHF iso of volume overload complicated by PEA arrest with conversion to VT after epi and cardioversion => ROSC and recurrent AF w/ RVR.    Recommendations:  [ ] Can increase metoprolol to 50mg tartrate q8hrs  [ ] Patient needs to be therapeutically anticoagulated prior to cardioversion  [ ] if still in RVR and therapeutically anticoagulated after volume optimization/dialysis can consider MILLY/DCCV  [ ] Decision re: CRT-D pending ischemic evaluation, re-evaluation of LV function when optimized.        August Neri MD  Cardiac Electrophysiology  [facility] - Cardiology Stepdown

## 2024-07-04 NOTE — PLAN OF CARE
Problem: Adult Inpatient Plan of Care  Goal: Plan of Care Review  Outcome: Progressing  Goal: Patient-Specific Goal (Individualized)  Outcome: Progressing  Goal: Absence of Hospital-Acquired Illness or Injury  Outcome: Progressing  Goal: Optimal Comfort and Wellbeing  Outcome: Progressing  Goal: Readiness for Transition of Care  Outcome: Progressing     Problem: Diabetes Comorbidity  Goal: Blood Glucose Level Within Targeted Range  Outcome: Progressing     Problem: Sepsis/Septic Shock  Goal: Optimal Coping  Outcome: Progressing  Goal: Absence of Bleeding  Outcome: Progressing  Goal: Blood Glucose Level Within Targeted Range  Outcome: Progressing  Goal: Absence of Infection Signs and Symptoms  Outcome: Progressing  Goal: Optimal Nutrition Intake  Outcome: Progressing     Problem: Bariatric Environmental Safety  Goal: Safety Maintained with Care  Outcome: Progressing     Problem: Wound  Goal: Optimal Coping  Outcome: Progressing  Goal: Optimal Functional Ability  Outcome: Progressing  Goal: Absence of Infection Signs and Symptoms  Outcome: Progressing  Goal: Improved Oral Intake  Outcome: Progressing  Goal: Optimal Pain Control and Function  Outcome: Progressing  Goal: Skin Health and Integrity  Outcome: Progressing  Goal: Optimal Wound Healing  Outcome: Progressing     Problem: Skin Injury Risk Increased  Goal: Skin Health and Integrity  Outcome: Progressing     Problem: Fall Injury Risk  Goal: Absence of Fall and Fall-Related Injury  Outcome: Progressing

## 2024-07-04 NOTE — SUBJECTIVE & OBJECTIVE
"Past Medical History:   Diagnosis Date    Anticoagulant long-term use     Arthritis     Breast cancer 2014    invasive lobular carcinoma    Cancer of kidney 11/2020    RIGHT KIDNEY CANCER    CHF (congestive heart failure)     Coronary artery disease dx 2005    Depression     Diabetes mellitus     Diastolic heart failure secondary to hypertension     Gout     Hyperlipemia     Hypertension     Hypertrophy of nasal turbinates     Kidney mass 2020    Right    Levoscoliosis     Lung nodule     left    Multiple thyroid nodules     NICOLE (obstructive sleep apnea)     uses C-PAP    Pulmonary hypertension        Past Surgical History:   Procedure Laterality Date    AORTOGRAPHY N/A 12/04/2020    Procedure: Aortogram;  Surgeon: Paul Pedersen MD;  Location: [facility] CATH;  Service: Cardiology;  Laterality: N/A;    BREAST SURGERY      CARDIAC CATHETERIZATION  12/2020    CHOLECYSTECTOMY      COLONOSCOPY      multi -last 2014     CORONARY ARTERY BYPASS GRAFT      ESOPHAGOGASTRODUODENOSCOPY      2012     HAND SURGERY Right     INSERTION OF CATHETER N/A 6/23/2024    Procedure: Insertion,catheter;  Surgeon: Meli Griffin MD;  Location: [facility] OR;  Service: Vascular;  Laterality: N/A;  ORIGINAL CATHETER WAS REPOSITIONED.  NO NEW CATHETER WAS PLACED    INSERTION, CATHETER, DIALYSIS, PERITONEAL N/A 6/4/2024    Procedure: IN Insertion Catheter, Dialysis, Peritoneal - laparoscopic;  Surgeon: Rodriguez Catalan Jr., MD;  Location: [facility] OR;  Service: General;  Laterality: N/A;    LAPAROSCOPIC ROBOT-ASSISTED SURGICAL REMOVAL OF KIDNEY USING DA CHELLE XI Right 03/10/2022    Procedure: XI ROBOTIC NEPHRECTOMY- radical;  Surgeon: Rolando Ramirez MD;  Location: [facility] OR;  Service: Urology;  Laterality: Right;    MASTECTOMY W/ SENTINEL NODE BIOPSY Bilateral 01/21/2015    bilateral "dog ears"    NASAL SINUS SURGERY  2015    Dr Bryant FESS/cauterization turbinate     PARTIAL HYSTERECTOMY  1989    PERCUTANEOUS TRANSLUMINAL BALLOON ANGIOPLASTY OF " CORONARY ARTERY  12/04/2020    Procedure: Angioplasty-coronary;  Surgeon: Paul Pedersen MD;  [facility]ATH;  Service: Cardiology;;    RENAL BIOPSY Right     9/20/2021 EJ    TUBAL LIGATION      ULTRASOUND GUIDANCE  12/04/2020    Procedure: Ultrasound Guidance;  Surgeon: Paul Pedersen MD;  [facility]ATH;  Service: Cardiology;;       Review of patient's allergies indicates:   Allergen Reactions    Percocet [oxycodone-acetaminophen] Itching    Amiodarone analogues      Itching      Irbesartan Swelling    Januvia [sitagliptin]     Jardiance [empagliflozin]      Leg cramps    Lipitor [atorvastatin] Other (See Comments)     Severe leg pain    Linaclotide Other (See Comments) and Nausea And Vomiting     Does not remember    Lubiprostone Other (See Comments) and Palpitations     Does not remember       No current facility-administered medications on file prior to encounter.     Current Outpatient Medications on File Prior to Encounter   Medication Sig    allopurinoL (ZYLOPRIM) 300 MG tablet Take 1 tablet (300 mg total) by mouth once daily.    amLODIPine (NORVASC) 10 MG tablet Take 10 mg by mouth once daily.    blood sugar diagnostic (TRUE METRIX GLUCOSE TEST STRIP) Strp Use 1x daily. Insurance preferred.    blood sugar diagnostic Strp To check BG 1 time daily, to use with insurance preferred meter    calcitRIOL (ROCALTROL) 0.5 MCG Cap Take 0.5 mcg by mouth once daily.    carvediloL (COREG) 25 MG tablet Take 1 tablet (25 mg total) by mouth 2 (two) times daily.    cloNIDine (CATAPRES) 0.1 MG tablet Take 1 tablet (0.1 mg total) by mouth 3 (three) times daily as needed (PRN SBP > 165 mmHg).    cloNIDine 0.1 mg/24 hr td ptwk (CATAPRES) 0.1 mg/24 hr Place 1 patch onto the skin every 7 days.    cyanocobalamin (VITAMIN B-12) 100 MCG tablet Take 100 mcg by mouth once daily.    ELIQUIS 5 mg Tab TAKE 1 TABLET(5 MG) BY MOUTH TWICE DAILY    evolocumab (REPATHA SURECLICK) 140 mg/mL PnIj Inject 1 mL (140 mg total) into the  skin every 14 (fourteen) days.    furosemide (LASIX) 40 MG tablet Take 40 mg by mouth once daily.    lancets Misc To check BG 1 times daily, to use with insurance preferred meter    loratadine (CLARITIN) 10 mg tablet Take 10 mg by mouth once daily.    magnesium oxide (MAG-OX) 400 mg (241.3 mg magnesium) tablet Take 1 tablet (400 mg total) by mouth daily as needed (cramping).    nitroGLYCERIN (NITROSTAT) 0.4 MG SL tablet Place 1 tablet (0.4 mg total) under the tongue every 5 (five) minutes as needed for Chest pain.    [DISCONTINUED] aspirin (ECOTRIN) 81 MG EC tablet Take 1 tablet (81 mg total) by mouth once daily.    [DISCONTINUED] DULoxetine (CYMBALTA) 20 MG capsule TAKE ONE CAPSULE BY MOUTH ONCE DAILY    [DISCONTINUED] folic acid (FOLVITE) 1 MG tablet Take 1 mg by mouth once daily.    [DISCONTINUED] metFORMIN (GLUCOPHAGE-XR) 500 MG ER 24hr tablet Take 2 tablets (1,000 mg total) by mouth 2 (two) times daily with meals.    [DISCONTINUED] sodium bicarbonate 650 MG tablet Take 1 tablet (650 mg total) by mouth once daily.     Family History       Problem Relation (Age of Onset)    Asthma Daughter    Birth defects Daughter    Breast cancer Mother, Maternal Aunt    Depression Daughter    Drug abuse Daughter, Daughter    Glaucoma Sister    Hepatitis Brother    Hypertension Father    Learning disabilities Daughter    Mental illness Daughter    Stroke Father          Tobacco Use    Smoking status: Former     Current packs/day: 0.00     Types: Cigarettes     Start date: 2016     Quit date: 2016     Years since quittin.5    Smokeless tobacco: Never    Tobacco comments:     quit    Substance and Sexual Activity    Alcohol use: No    Drug use: No    Sexual activity: Not Currently     Partners: Male     Birth control/protection: None     Review of Systems   Constitutional: Negative for chills and fever.   HENT: Negative.     Cardiovascular:  Positive for dyspnea on exertion, irregular heartbeat and  palpitations. Negative for chest pain, near-syncope and syncope.   Respiratory:  Positive for shortness of breath. Negative for cough, sputum production and wheezing.    Gastrointestinal:  Negative for abdominal pain, diarrhea, nausea and vomiting.   Neurological: Negative.    Psychiatric/Behavioral:  Positive for depression.    Allergic/Immunologic: Negative.      Objective:     Vital Signs (Most Recent):  Temp: 99 °F (37.2 °C) (07/03/24 2311)  Pulse: (!) 120 (07/04/24 0326)  Resp: 15 (07/03/24 2311)  BP: 109/72 (07/03/24 2311)  SpO2: 97 % (07/03/24 2311) Vital Signs (24h Range):  Temp:  [98 °F (36.7 °C)-99 °F (37.2 °C)] 99 °F (37.2 °C)  Pulse:  [] 120  Resp:  [15-20] 15  SpO2:  [91 %-100 %] 97 %  BP: (109-136)/(68-89) 109/72       Weight: (!) 137 kg (302 lb 0.5 oz)  Body mass index is 47.3 kg/m².    SpO2: 97 %        Physical Exam  Constitutional:       General: She is not in acute distress.     Appearance: She is obese. She is not toxic-appearing.   HENT:      Head: Normocephalic.      Mouth/Throat:      Mouth: Mucous membranes are moist.   Eyes:      General: No scleral icterus.     Extraocular Movements: Extraocular movements intact.      Pupils: Pupils are equal, round, and reactive to light.   Cardiovascular:      Rate and Rhythm: Normal rate. Rhythm irregular.      Heart sounds: No murmur heard.  Pulmonary:      Effort: Pulmonary effort is normal.      Breath sounds: No wheezing, rhonchi or rales.   Abdominal:      Palpations: Abdomen is soft.      Tenderness: There is no abdominal tenderness.   Musculoskeletal:      Right lower leg: Edema present.      Left lower leg: Edema present.   Skin:     General: Skin is warm and dry.      Findings: Bruising present.   Psychiatric:         Mood and Affect: Mood is depressed. Affect is flat.         Behavior: Behavior is withdrawn.            Significant Labs: EP:   Recent Labs   Lab 07/03/24  0727 07/03/24  0809 07/04/24  0528     --   --    K 5.0  --    "--      --   --    CO2 18*  --   --    *  --   --    BUN 88*  --   --    CREATININE 7.0*  --   --    CALCIUM 9.3  --   --    PROT 6.3  --   --    ALBUMIN 2.6*  --   --    BILITOT 0.8  --   --    ALKPHOS 101  --   --    AST 15  --   --    ALT 11  --   --    ANIONGAP 12  --   --    WBC 9.05 10.33 9.54  9.54   HGB 10.9* 10.6* 10.7*  10.7*   HCT 36.4* 35.0* 34.8*  34.8*    260 284  284   INR  --  1.0  --    , CMP:   Recent Labs   Lab 07/03/24  0727      K 5.0      CO2 18*   *   BUN 88*   CREATININE 7.0*   CALCIUM 9.3   PROT 6.3   ALBUMIN 2.6*   BILITOT 0.8   ALKPHOS 101   AST 15   ALT 11   ANIONGAP 12   , CBC:   Recent Labs   Lab 07/03/24  0727 07/03/24  0809 07/04/24  0528   WBC 9.05 10.33 9.54  9.54   HGB 10.9* 10.6* 10.7*  10.7*   HCT 36.4* 35.0* 34.8*  34.8*    260 284  284   , INR:   Recent Labs   Lab 07/03/24  0809   INR 1.0   , Lipid Panel No results for input(s): "CHOL", "HDL", "LDLCALC", "TRIG", "CHOLHDL" in the last 48 hours., and All pertinent lab results from the last 24 hours have been reviewed.    Significant Imaging: Echocardiogram: Transthoracic echo (TTE) complete (Cupid Only):   Results for orders placed or performed during the hospital encounter of 07/03/24   Echo   Result Value Ref Range    RA Width 4.57 cm    LA volume (mod) 71.24 cm3    Left Atrium Major Axis 6.52 cm    Left Atrium Minor Axis 6.53 cm    RA Major Axis 6.28 cm    LV Diastolic Volume 169.89 mL    LV Systolic Volume 124.41 mL    PV Peak D Harley 0.23 m/s    PV Peak S Harley 0.14 m/s    MV stenosis pressure 1/2 time 53.56 ms    TR Max Harley 3.39 m/s    MV Peak E Harley 1.19 m/s    Mr max harley 0.04 m/s    Ao VTI 19.89 cm    Ao peak harley 1.21 m/s    LVOT peak VTI 14.22 cm    LVOT peak harley 0.89 m/s    LVOT diameter 2.06 cm    IVRT 114.18 msec    E wave deceleration time 184.67 msec    AV mean gradient 3 mmHg    RV- boothe basal diam 4.4 cm    TAPSE 1.29 cm    LA size 4.90 cm    Ascending aorta 3.04 cm "    STJ 2.64 cm    Sinus 3.16 cm    LVIDs 5.11 (A) 2.1 - 4.0 cm    Posterior Wall 1.01 0.6 - 1.1 cm    IVS 0.92 0.6 - 1.1 cm    LVIDd 5.85 3.5 - 6.0 cm    TDI LATERAL 0.08 m/s    LA WIDTH 4.78 cm    TDI SEPTAL 0.05 m/s    LV LATERAL E/E' RATIO 14.88 m/s    LV SEPTAL E/E' RATIO 23.80 m/s    FS 13 (A) 28 - 44 %    LA volume 129.90 cm3    LV mass 225.83 g    Left Ventricle Relative Wall Thickness 0.35 cm    AV valve area 2.38 cm²    AV Velocity Ratio 0.74     AV index (prosthetic) 0.71     MV valve area p 1/2 method 4.11 cm2    Mean e' 0.07 m/s    Pulm vein S/D ratio 0.61     LVOT area 3.3 cm2    LVOT stroke volume 47.37 cm3    AV peak gradient 6 mmHg    E/E' ratio 18.31 m/s    Triscuspid Valve Regurgitation Peak Gradient 46 mmHg    MARKO by Velocity Ratio 2.45 cm²    BSA 2.54 m2    LV Systolic Volume Index 51.6 mL/m2    LV Diastolic Volume Index 70.49 mL/m2    LV Mass Index 94 g/m2    LA Volume Index 53.9 mL/m2    LA Volume Index (Mod) 29.6 mL/m2    ZLVIDS -2.13     ZLVIDD -6.48     TV resting pulmonary artery pressure 61 mmHg    RV TB RVSP 18 mmHg    Est. RA pres 15 mmHg    Narrative      Left Ventricle: The left ventricle is moderately dilated. Normal wall   thickness. Global hypokinesis present. There is severely reduced systolic   function with a visually estimated ejection fraction of 20 - 25%.    Right Ventricle: Mild right ventricular enlargement. Wall thickness is   normal. Right ventricle wall motion has global hypokinesis. Systolic   function is moderately reduced.    Left Atrium: Left atrium is severely dilated.    Right Atrium: Right atrium is severely dilated.    Aortic Valve: The aortic valve is a trileaflet valve. There is mild   aortic valve sclerosis.    Mitral Valve: There is mild regurgitation.    Tricuspid Valve: There is severe regurgitation.    Pulmonary Artery: There is moderate pulmonary hypertension. The   estimated pulmonary artery systolic pressure is 61 mmHg.    IVC/SVC: Elevated venous  pressure at 15 mmHg.

## 2024-07-04 NOTE — CONSULTS
[facility] MICU    Consulted for increased WOB. Patient EMR reviewed; vitals, labs, and imaging from past 24 hours reviewed including progress and consult notes.     Patient is a 75y/o woman, mhx DMII, CAD s/p PCI, pAF, RCC s/p R nephrectomy, HFrEF (20-25% with global hypokinesis; 7/2024), CKD 4-5 s/p PD catheter placement, morbid obesity, transferred to this facility for higher level of care. Patient was initially admitted to OSH on 6/18/24 for heart failure exacerbation. While undergoing inpatient treatment, patient developed PEA arrest that subsequently converted to VTach on 6/24/24 during HD; ROSC achieved after defibrillation. Patient was found to have a new significant reduction in her EF from 40% to 20-25%; Cardiology at OSH recommended ICD & angiogram once clinically improved. Patient underwent volume removal with HD and was extubated. Unfortunately, was found to have possible SSTI surrounding her left tunneled hemodialysis catheter, which was subsequently removed on 7/1/24 (blood cultures and catheter tip culture negative). Patient has since been on vanc/cefepime with no fevers and resolution of leukocytosis. Patient was transferred to this facility on 7/3/24 per family request as well as for cardiology evaluation/cardiac intervention. Patient has been admitted to  with Cardiology and Nephrology following. Cardiology with plan for LHC once euvolemic. EP following for assistance with patient's pAF. Nephrology has been attempting to perform volume removal with nightly PD. Unfortunately, it appears on 500-800cc was removed during PD last night. On my evaluation, patient without acute complaints, but daughter voicing concern that patient's legs appear to be swelling again.    On my evaluation, patient A&Ox2, in NAD, without resp distress, speaking comfortably in full sentences and maintaining oral secretions with no increased WOB. 2LNC; rales bilaterally. Benign abdomen. Appearing grossly edematous with BLE  pitting edema.    No indication for ICU level of care at this time. Have spoken with primary team and recommended discussion with Nephrology regarding standard HD for volume removal.    For further questions/concerns, please call critical care fellow at #[phone].    Cory Wang MD  [facility] Critical Care Fellow  7/4/2024 @ 5:42 PM

## 2024-07-04 NOTE — ASSESSMENT & PLAN NOTE
Patient with Paroxysmal (<7 days) atrial fibrillation which is controlled currently with  diltiazem . Patient is currently in sinus rhythm.[id number] Score: 4. Pt. Has refused eliquis. Currently on heparin drip.    -cards & EP following, appreciate recs  -continue heparin gtt anticoagulation  -metoprolol succinate 25mg TID, will increase to 50mg if BP tolerating  -Cardiology to consider cardioversion w/ MILLY, pacemaker once euvolemic and pending ischemia eval

## 2024-07-04 NOTE — HOSPITAL COURSE
Ms. Taylor was admitted to MICU on 7/8 for urgent dialysis line placement and initiated of HD. Patient received short run of CRRT overnight without issue. Encephalopathy improving with continued CRRT treatments. Persistent AFib with RVR, received Digoxin loading dose (renally dosed), restarted PO Carvedilol. Patient remains in persistent AFib RVR despite therapeutic digoxin level and uptitration of Carvedilol levels, started on Amiodarone infusion overnight. Transitioned to amio PO, started on heparin gtt for afib AC. Tolerated HD well overnight without complications and less encephalopathic upon exam. Planning for gen surg to remove PD catheter. Upon induction pt vomited copious amounts of bile, was intubated. PD cathter removed successfully. Extubated to NC 7/16.      7/18:  Patient made DNR after Palliative Care discussions yesterday.  Tolerated SLED 12 hours overnight.  Discussed difficulties regarding HD line with Interventional Nephrology and plans to replace it next week if patient it amenable.  Patient was stepped down from ICU.  7/19:  Stepped back up to medical ICU with plans for overnight SLED x 3 days.     On 7/19 Nephrology requested that the patient be stepped back up to the medical ICU for SLED. A temporary trialysis HD catheter was placed on 7/20/24 in the groin. She also began having diarrhea, which tested positive for Clostridium difficile so she was started on PO vancomycin 7/22/24. HD trial on 7/24; remained HDS. Ready for stepdown.    ID consulted and patient changed to fidaximicin. CT with Prominent wall thickening of the transverse colon and descending colon most prominent at the proximal transverse colon. GI consulted and pt not a candidate for fecal transplant currently.   Interventional nephrology plan to replace tunneled line on 8/1/24

## 2024-07-04 NOTE — SUBJECTIVE & OBJECTIVE
Interval History:   - heparin not running this morning while I was at bedside  - metoprolol increased to 25mg TID  - Afib rate controlled on tele  - Patient reporting depressive symptoms and despondency related to reduced QOL and poor functional status  - PD overnight with 1.2L of fluid removed, 0.5L of urine made yesterday    Review of Systems   Constitutional: Positive for malaise/fatigue.   HENT: Negative.     Cardiovascular:  Positive for dyspnea on exertion and palpitations. Negative for chest pain.   Respiratory:  Positive for shortness of breath. Negative for sputum production.    Psychiatric/Behavioral:  Positive for depression.      Objective:     Vital Signs (Most Recent):  Temp: 97.8 °F (36.6 °C) (07/04/24 1530)  Pulse: (!) 132 (07/04/24 1530)  Resp: 20 (07/04/24 1530)  BP: (!) 137/97 (07/04/24 1530)  SpO2: 99 % (07/04/24 1530) Vital Signs (24h Range):  Temp:  [97.8 °F (36.6 °C)-99 °F (37.2 °C)] 97.8 °F (36.6 °C)  Pulse:  [] 132  Resp:  [15-20] 20  SpO2:  [97 %-99 %] 99 %  BP: (109-137)/(68-97) 137/97     Weight: (!) 137 kg (302 lb 0.5 oz)  Body mass index is 47.3 kg/m².     SpO2: 99 %        Physical Exam  Constitutional:       Appearance: She is obese. She is ill-appearing.   HENT:      Head: Normocephalic.      Mouth/Throat:      Mouth: Mucous membranes are moist.   Eyes:      Extraocular Movements: Extraocular movements intact.   Cardiovascular:      Rate and Rhythm: Tachycardia present. Rhythm irregular.      Pulses: Normal pulses.   Pulmonary:      Effort: No respiratory distress.      Breath sounds: Normal breath sounds.   Abdominal:      Palpations: Abdomen is soft.      Tenderness: There is no abdominal tenderness.   Musculoskeletal:      Right lower leg: Edema present.      Left lower leg: Edema present.   Neurological:      Mental Status: She is alert.   Psychiatric:         Mood and Affect: Mood is depressed. Affect is flat.         Behavior: Behavior is withdrawn.            Significant  Labs: EP:   Recent Labs   Lab 07/03/24  0727 07/03/24  0809 07/04/24  0528 07/04/24  0808     --   --  137   K 5.0  --   --  4.6     --   --  106   CO2 18*  --   --  18*   *  --   --  133*   BUN 88*  --   --  91*   CREATININE 7.0*  --   --  6.9*   CALCIUM 9.3  --   --  9.2   PROT 6.3  --   --  6.3   ALBUMIN 2.6*  --   --  2.6*   BILITOT 0.8  --   --  0.8   ALKPHOS 101  --   --  97   AST 15  --   --  17   ALT 11  --   --  12   ANIONGAP 12  --   --  13   WBC 9.05 10.33 9.54  9.54  --    HGB 10.9* 10.6* 10.7*  10.7*  --    HCT 36.4* 35.0* 34.8*  34.8*  --     260 284  284  --    INR  --  1.0  --   --     and All pertinent lab results from the last 24 hours have been reviewed.    Significant Imaging: Echocardiogram: Transthoracic echo (TTE) complete (Cupid Only):   Results for orders placed or performed during the hospital encounter of 07/03/24   Echo   Result Value Ref Range    RA Width 4.57 cm    LA volume (mod) 71.24 cm3    Left Atrium Major Axis 6.52 cm    Left Atrium Minor Axis 6.53 cm    RA Major Axis 6.28 cm    LV Diastolic Volume 169.89 mL    LV Systolic Volume 124.41 mL    PV Peak D Harley 0.23 m/s    PV Peak S Harley 0.14 m/s    MV stenosis pressure 1/2 time 53.56 ms    TR Max Harley 3.39 m/s    MV Peak E Harley 1.19 m/s    Mr max harley 0.04 m/s    Ao VTI 19.89 cm    Ao peak harley 1.21 m/s    LVOT peak VTI 14.22 cm    LVOT peak harley 0.89 m/s    LVOT diameter 2.06 cm    IVRT 114.18 msec    E wave deceleration time 184.67 msec    AV mean gradient 3 mmHg    RV- boothe basal diam 4.4 cm    TAPSE 1.29 cm    LA size 4.90 cm    Ascending aorta 3.04 cm    STJ 2.64 cm    Sinus 3.16 cm    LVIDs 5.11 (A) 2.1 - 4.0 cm    Posterior Wall 1.01 0.6 - 1.1 cm    IVS 0.92 0.6 - 1.1 cm    LVIDd 5.85 3.5 - 6.0 cm    TDI LATERAL 0.08 m/s    LA WIDTH 4.78 cm    TDI SEPTAL 0.05 m/s    LV LATERAL E/E' RATIO 14.88 m/s    LV SEPTAL E/E' RATIO 23.80 m/s    FS 13 (A) 28 - 44 %    LA volume 129.90 cm3    LV mass 225.83 g    Left  Ventricle Relative Wall Thickness 0.35 cm    AV valve area 2.38 cm²    AV Velocity Ratio 0.74     AV index (prosthetic) 0.71     MV valve area p 1/2 method 4.11 cm2    Mean e' 0.07 m/s    Pulm vein S/D ratio 0.61     LVOT area 3.3 cm2    LVOT stroke volume 47.37 cm3    AV peak gradient 6 mmHg    E/E' ratio 18.31 m/s    Triscuspid Valve Regurgitation Peak Gradient 46 mmHg    MARKO by Velocity Ratio 2.45 cm²    BSA 2.54 m2    LV Systolic Volume Index 51.6 mL/m2    LV Diastolic Volume Index 70.49 mL/m2    LV Mass Index 94 g/m2    LA Volume Index 53.9 mL/m2    LA Volume Index (Mod) 29.6 mL/m2    ZLVIDS -2.13     ZLVIDD -6.48     TV resting pulmonary artery pressure 61 mmHg    RV TB RVSP 18 mmHg    Est. RA pres 15 mmHg    Narrative      Left Ventricle: The left ventricle is moderately dilated. Normal wall   thickness. Global hypokinesis present. There is severely reduced systolic   function with a visually estimated ejection fraction of 20 - 25%.    Right Ventricle: Mild right ventricular enlargement. Wall thickness is   normal. Right ventricle wall motion has global hypokinesis. Systolic   function is moderately reduced.    Left Atrium: Left atrium is severely dilated.    Right Atrium: Right atrium is severely dilated.    Aortic Valve: The aortic valve is a trileaflet valve. There is mild   aortic valve sclerosis.    Mitral Valve: There is mild regurgitation.    Tricuspid Valve: There is severe regurgitation.    Pulmonary Artery: There is moderate pulmonary hypertension. The   estimated pulmonary artery systolic pressure is 61 mmHg.    IVC/SVC: Elevated venous pressure at 15 mmHg.

## 2024-07-04 NOTE — PLAN OF CARE
Problem: Adult Inpatient Plan of Care  Goal: Plan of Care Review  Outcome: Progressing     Problem: Diabetes Comorbidity  Goal: Blood Glucose Level Within Targeted Range  Outcome: Progressing     Problem: Sepsis/Septic Shock  Goal: Absence of Infection Signs and Symptoms  Outcome: Progressing

## 2024-07-04 NOTE — NURSING
Pt's daughter called nurses's station to report that patient fell out of bed. Upon entering room pt was on floor. Bed alarm did not trigger despite being set. Pt denies hitting head or any other body parts. Charge nurse and Provider on call notified. Pt assisted from floor with valeria lift. Pt and daughter educated on call light use, bed alarms, and bleeding risk (heparin drip). Bed alarm set and tested. Order for fall risk camera placed. Camera in place and active.

## 2024-07-04 NOTE — SUBJECTIVE & OBJECTIVE
Interval History: Pt resting in bed this AM with daughter at bedside, PD removed 1.2L yesterday and 500 cc UOP, CBC stable, CMP pending, plans to continue volume removed with lasix and PD    ROS  Objective:     Vital Signs (Most Recent):  Temp: 97.8 °F (36.6 °C) (07/04/24 0857)  Pulse: (!) 144 (07/04/24 0857)  Resp: 20 (07/04/24 0857)  BP: (!) 124/95 (07/04/24 0857)  SpO2: 99 % (07/04/24 0857) Vital Signs (24h Range):  Temp:  [97.8 °F (36.6 °C)-99 °F (37.2 °C)] 97.8 °F (36.6 °C)  Pulse:  [] 144  Resp:  [15-20] 20  SpO2:  [91 %-99 %] 99 %  BP: (109-136)/(68-95) 124/95     Weight: (!) 137 kg (302 lb 0.5 oz)  Body mass index is 47.3 kg/m².     SpO2: 99 %         Intake/Output Summary (Last 24 hours) at 7/4/2024 0857  Last data filed at 7/4/2024 0607  Gross per 24 hour   Intake --   Output 1782 ml   Net -1782 ml       Lines/Drains/Airways       Drain  Duration             Female External Urinary Catheter w/ Suction 07/03/24 0430 1 day              Peripheral Intravenous Line  Duration                  Peripheral IV - Single Lumen 07/03/24 1029 20 G 1 3/4 in No Right Forearm <1 day         Peripheral IV - Single Lumen 07/03/24 1435 20 G 1 3/4 in Anterior;Proximal;Right Forearm <1 day                       Physical Exam  Vitals and nursing note reviewed.   Constitutional:       General: She is not in acute distress.     Appearance: She is obese. She is ill-appearing. She is not toxic-appearing.      Interventions: Nasal cannula in place.   HENT:      Head: Normocephalic and atraumatic.   Eyes:      Extraocular Movements: Extraocular movements intact.      Pupils: Pupils are equal, round, and reactive to light.   Cardiovascular:      Rate and Rhythm: Normal rate. Rhythm irregular.      Pulses:           Radial pulses are 2+ on the right side and 2+ on the left side.      Heart sounds: Heart sounds are distant.      Gallop present. S3 sounds present.   Pulmonary:      Effort: Pulmonary effort is normal. No respiratory  distress.      Breath sounds: No stridor. No wheezing or rhonchi.   Abdominal:      General: There is no distension.      Palpations: Abdomen is soft.      Tenderness: There is no abdominal tenderness. There is no guarding.   Musculoskeletal:      Cervical back: Normal range of motion and neck supple.      Right lower leg: 3+ Pitting Edema present.      Left lower leg: 3+ Pitting Edema present.   Skin:     General: Skin is cool.   Neurological:      General: No focal deficit present.      Mental Status: She is lethargic.      Motor: Weakness present.      Gait: Gait abnormal.   Psychiatric:         Mood and Affect: Mood normal.         Behavior: Behavior normal. Behavior is cooperative.            Significant Labs: CBC   Recent Labs   Lab 07/03/24  0727 07/03/24  0809 07/04/24  0528   WBC 9.05 10.33 9.54  9.54   HGB 10.9* 10.6* 10.7*  10.7*   HCT 36.4* 35.0* 34.8*  34.8*    260 284  284       Significant Imaging: Echocardiogram: Transthoracic echo (TTE) complete (Cupid Only):   Results for orders placed or performed during the hospital encounter of 07/03/24   Echo   Result Value Ref Range    RA Width 4.57 cm    LA volume (mod) 71.24 cm3    Left Atrium Major Axis 6.52 cm    Left Atrium Minor Axis 6.53 cm    RA Major Axis 6.28 cm    LV Diastolic Volume 169.89 mL    LV Systolic Volume 124.41 mL    PV Peak D Harley 0.23 m/s    PV Peak S Harley 0.14 m/s    MV stenosis pressure 1/2 time 53.56 ms    TR Max Harley 3.39 m/s    MV Peak E Harley 1.19 m/s    Mr max harley 0.04 m/s    Ao VTI 19.89 cm    Ao peak harley 1.21 m/s    LVOT peak VTI 14.22 cm    LVOT peak harley 0.89 m/s    LVOT diameter 2.06 cm    IVRT 114.18 msec    E wave deceleration time 184.67 msec    AV mean gradient 3 mmHg    RV- boothe basal diam 4.4 cm    TAPSE 1.29 cm    LA size 4.90 cm    Ascending aorta 3.04 cm    STJ 2.64 cm    Sinus 3.16 cm    LVIDs 5.11 (A) 2.1 - 4.0 cm    Posterior Wall 1.01 0.6 - 1.1 cm    IVS 0.92 0.6 - 1.1 cm    LVIDd 5.85 3.5 - 6.0 cm    TDI  LATERAL 0.08 m/s    LA WIDTH 4.78 cm    TDI SEPTAL 0.05 m/s    LV LATERAL E/E' RATIO 14.88 m/s    LV SEPTAL E/E' RATIO 23.80 m/s    FS 13 (A) 28 - 44 %    LA volume 129.90 cm3    LV mass 225.83 g    Left Ventricle Relative Wall Thickness 0.35 cm    AV valve area 2.38 cm²    AV Velocity Ratio 0.74     AV index (prosthetic) 0.71     MV valve area p 1/2 method 4.11 cm2    Mean e' 0.07 m/s    Pulm vein S/D ratio 0.61     LVOT area 3.3 cm2    LVOT stroke volume 47.37 cm3    AV peak gradient 6 mmHg    E/E' ratio 18.31 m/s    Triscuspid Valve Regurgitation Peak Gradient 46 mmHg    MARKO by Velocity Ratio 2.45 cm²    BSA 2.54 m2    LV Systolic Volume Index 51.6 mL/m2    LV Diastolic Volume Index 70.49 mL/m2    LV Mass Index 94 g/m2    LA Volume Index 53.9 mL/m2    LA Volume Index (Mod) 29.6 mL/m2    ZLVIDS -2.13     ZLVIDD -6.48     TV resting pulmonary artery pressure 61 mmHg    RV TB RVSP 18 mmHg    Est. RA pres 15 mmHg    Narrative      Left Ventricle: The left ventricle is moderately dilated. Normal wall   thickness. Global hypokinesis present. There is severely reduced systolic   function with a visually estimated ejection fraction of 20 - 25%.    Right Ventricle: Mild right ventricular enlargement. Wall thickness is   normal. Right ventricle wall motion has global hypokinesis. Systolic   function is moderately reduced.    Left Atrium: Left atrium is severely dilated.    Right Atrium: Right atrium is severely dilated.    Aortic Valve: The aortic valve is a trileaflet valve. There is mild   aortic valve sclerosis.    Mitral Valve: There is mild regurgitation.    Tricuspid Valve: There is severe regurgitation.    Pulmonary Artery: There is moderate pulmonary hypertension. The   estimated pulmonary artery systolic pressure is 61 mmHg.    IVC/SVC: Elevated venous pressure at 15 mmHg.

## 2024-07-04 NOTE — PT/OT/SLP EVAL
Occupational Therapy  Co- Evaluation  Co-treatment performed due to patient's multiple deficits requiring two skilled therapists to appropriately and safely assess patient's strength and endurance while facilitating functional tasks in addition to accommodating for patient's activity tolerance.    Name: Juhi Taylor  MRN: [id number]  Admitting Diagnosis: Severe sepsis  Recent Surgery: * No surgery found *      Recommendations:     Discharge Recommendations: Moderate Intensity Therapy  Discharge Equipment Recommendations:  bedside commode, bath bench  Barriers to discharge:  Other (Comment) (increased skilled A required)    Assessment:     Juhi Taylor is a 74 y.o. female with a medical diagnosis of Severe sepsis.  She presents with performance deficits affecting function: weakness, impaired endurance, decreased lower extremity function, impaired self care skills, decreased coordination, impaired functional mobility, decreased safety awareness.  Pt presents A&O x3 , and agreeable to tx. Pt participated adequately overall given minimal assist -max task dependent t/o session focused on ADLs, bed mobility and and functional transfers. Pt is appropriate candidate for Moderate Insensity therapy post acute secondary to a declien in status, pt was tsf [facility] ED 6/18 with SOB, and s/p PEA arrest 6/24/24.     Rehab Prognosis: Good; patient would benefit from acute skilled OT services to address these deficits and reach maximum level of function.       Plan:     Patient to be seen 4 x/week to address the above listed problems via self-care/home management, therapeutic activities, therapeutic exercises, neuromuscular re-education  Plan of Care Expires: 08/04/24  Plan of Care Reviewed with: patient    Subjective     Chief Complaint: Severe sepsis  Patient/Family Comments/goals: Wanting to do more, mildly anxious regarding falling (fall prior night)    Occupational Profile:  Living Environment: [facility]  with dtr and teen grand sons, all of whom are home for assist at different times. Pt cannot confirm 24/7 assist/presence  Previous level of function: Nicolás with rollator, shower chair on 3L O2 home.   Equipment Used at Home: rollator, shower chair, oxygen  Assistance upon Discharge: Assistance of family, not guaranteed 24/7    Pain/Comfort:  Pain Rating 1: 0/10  Pain Rating Post-Intervention 1: 0/10    Patients cultural, spiritual, Sabianism conflicts given the current situation: no    Objective:     Communicated with: Nsg prior to session.  Patient found HOB elevated with oxygen, PureWick, peripheral IV, telemetry upon OT entry to room.    General Precautions: Standard, fall  Orthopedic Precautions: N/A  Braces: N/A  Respiratory Status: Nasal cannula, flow 2 L/min    Occupational Performance:    Bed Mobility:    Patient completed Rolling/Turning to Right with minimum assistance  Patient completed Supine to Sit with minimum assistance  Patient completed Sit to Supine with maximal assistance  Sits EOB >10m with SBA t/o dynamic activities    Functional Mobility/Transfers:  Patient completed Sit <> Stand Transfer with moderate assistance and of 2 persons  with  rolling walker   Functional Mobility: ~2 small side steps toward HOB RW and Martine    Activities of Daily Living:  Grooming: minimum assistance to sequence and organize materials sitting EOB, extended time  Toileting: total assistance for warren-anal hygiene in standing and purewick placement    Cognitive/Visual Perceptual:  Cognitive/Psychosocial Skills:     -       Oriented to: Person, Place, and Situation   -       Follows Commands/attention:Follows one-step commands  -       Safety awareness/insight to disability: intact and extra time, mildly decreased coord in B hands noted.     Physical Exam:  Upper Extremity Range of Motion:     -       Right Upper Extremity: WFL  -       Left Upper Extremity: WFL  Upper Extremity Strength:    -       Right Upper Extremity:  WFL  -       Left Upper Extremity: WFL  Fine Motor Coordination:    -       Intact    AMPAC 6 Click ADL:  AMPAC Total Score: 16    Treatment & Education:  -Education on energy conservation and task modification to maximize safety and (I) during ADLs and mobility  -Education on importance of OOB activity to improve overall activity tolerance and promote recovery  -Pt educated to call for assistance and to transfer with hospital staff only  -Provided education regarding role of OT, POC, & discharge recommendations with pt verbalizing understanding.     Pt had no further questions & when asked whether there were any concerns pt reported none.     Patient left HOB elevated with all lines intact, call button in reach, and bed alarm on    GOALS:   Multidisciplinary Problems       Occupational Therapy Goals          Problem: Occupational Therapy    Goal Priority Disciplines Outcome Interventions   Occupational Therapy Goal     OT, PT/OT     Description: Goals to be met by: 08/04/24     Patient will increase functional independence with ADLs by performing:    UE Dressing with Modified [place].  LE Dressing with Minimal Assistance.  Grooming while standing at sink with Stand-by Assistance.  Toileting from bedside commode with Minimal Assistance for hygiene and clothing management.   Toilet transfer to bedside commode with Supervision.    DME:  Tub transfer bench is required for pt to return to t/s use with shower chair at present unsuitable with need for mobiltiy greater than pt can safely complete at present.     Patient has a mobility limitation that significantly impairs their ability to participate in one or more mobility related activities of daily living, including toileting. This deficit can be resolved by using a bedside commode. Patient demonstrates mobility limitations that will cause them to be confined to one room at home without bathroom access for up to 30 days. Using a bedside commode will greatly improve  "the patient's ability to participate in MRADLs.     Ambulation needs TBD on progress.                              History:     Past Medical History:   Diagnosis Date    Anticoagulant long-term use     Arthritis     Breast cancer 2014    invasive lobular carcinoma    Cancer of kidney 11/2020    RIGHT KIDNEY CANCER    CHF (congestive heart failure)     Coronary artery disease dx 2005    Depression     Diabetes mellitus     Diastolic heart failure secondary to hypertension     Gout     Hyperlipemia     Hypertension     Hypertrophy of nasal turbinates     Kidney mass 2020    Right    Levoscoliosis     Lung nodule     left    Multiple thyroid nodules     NICOLE (obstructive sleep apnea)     uses C-PAP    Pulmonary hypertension          Past Surgical History:   Procedure Laterality Date    AORTOGRAPHY N/A 12/04/2020    Procedure: Aortogram;  Surgeon: Paul Pedersen MD;  Location: [facility] CATH;  Service: Cardiology;  Laterality: N/A;    BREAST SURGERY      CARDIAC CATHETERIZATION  12/2020    CHOLECYSTECTOMY      COLONOSCOPY      multi -last 2014     CORONARY ARTERY BYPASS GRAFT      ESOPHAGOGASTRODUODENOSCOPY      2012     HAND SURGERY Right     INSERTION OF CATHETER N/A 6/23/2024    Procedure: Insertion,catheter;  Surgeon: Meli Griffin MD;  Location: [facility] OR;  Service: Vascular;  Laterality: N/A;  ORIGINAL CATHETER WAS REPOSITIONED.  NO NEW CATHETER WAS PLACED    INSERTION, CATHETER, DIALYSIS, PERITONEAL N/A 6/4/2024    Procedure: IN Insertion Catheter, Dialysis, Peritoneal - laparoscopic;  Surgeon: Rodriguez Catalan Jr., MD;  Location: [facility] OR;  Service: General;  Laterality: N/A;    LAPAROSCOPIC ROBOT-ASSISTED SURGICAL REMOVAL OF KIDNEY USING DA CHELLE XI Right 03/10/2022    Procedure: XI ROBOTIC NEPHRECTOMY- radical;  Surgeon: Rolando Ramirez MD;  Location: [facility] OR;  Service: Urology;  Laterality: Right;    MASTECTOMY W/ SENTINEL NODE BIOPSY Bilateral 01/21/2015    bilateral "dog ears"    NASAL SINUS SURGERY  " 2015    Dr Bryant FESS/cauterization turbinate     PARTIAL HYSTERECTOMY  1989    PERCUTANEOUS TRANSLUMINAL BALLOON ANGIOPLASTY OF CORONARY ARTERY  12/04/2020    Procedure: Angioplasty-coronary;  Surgeon: Paul Pedersen MD;  Location: [facility] CATH;  Service: Cardiology;;    RENAL BIOPSY Right     9/20/2021 EJ    TUBAL LIGATION      ULTRASOUND GUIDANCE  12/04/2020    Procedure: Ultrasound Guidance;  Surgeon: Paul Pedersen MD;  [facility];  Service: Cardiology;;       Time Tracking:     OT Date of Treatment: 07/04/24  OT Start Time: 0934  OT Stop Time: 1003  OT Total Time (min): 29 min    Billable Minutes:Evaluation 6  Self Care/Home Management 15  Therapeutic Activity 8    7/4/2024

## 2024-07-04 NOTE — NURSING
Called the kitchen  and ask for patient's lunch tray since she has not receive it. Kitchen stated that they will going to send her an early supper.

## 2024-07-05 NOTE — ASSESSMENT & PLAN NOTE
74-year-old female with DMT2, ESRD on HD, pAF, CAD s/p PCI and HFrEF is admitted as a transfer for acute HFrEF in setting of CKD5 and recent PEA arrest during dialysis. Recent PD catheter placement on 6/4 but concerned for a possibly leak from cath. Unable to initiate dialysis for 2 weeks PTA to OSH due to insurance issues. Hospital course complicated by infected tunneled cath removed on 6/30. Transferred here for higher level of care. Initial plan at OSH was to replace tunneled catheter on the left side due to concerns to that right side may not by suitable. Peritoneal fluid studies negative for peritonitis.     Plan:  - Continue daily PD  - Would be cautious to initiate HD given her hemodynamic instability  - continue IV Lasix 160-200 q12hrs- 300 ccs of UOP noted  - Fluconazole per ISPD guidelines as prophylaxis  - Trend RFP; replete electrolytes PRN for goal K > 4, Phos > 3, Mg > 2  - Strict I&Os  - Avoid nephrotoxic agents  - Renally adjust medications   Reminder: Please contact the [facility] at [phone] when you have medication changes. Examples, new medications, antibiotics, discontinued medications, new supplements, missed doses of warfarin or if you took extra doses of warfarin. This also includes OTC medications. Notifying us helps reduce the possibility of high and low INR's. In addition, if warfarin needs to be held for any procedures, please have surgeon or physician's office contact us before holding anticoagulant. Thanks, [facility].

## 2024-07-05 NOTE — SUBJECTIVE & OBJECTIVE
Interval History: Patient with non labored breathing. Appears sad, does not want to talk to anyone. HR in the 100-130s. States she has too many complaints. Refusing all interventions at this time.    Objective:     Vital Signs (Most Recent):  Temp: 97.7 °F (36.5 °C) (07/05/24 1514)  Pulse: (!) 111 (07/05/24 1514)  Resp: 18 (07/05/24 1514)  BP: (!) 155/89 (07/05/24 1514)  SpO2: 97 % (07/05/24 1514) Vital Signs (24h Range):  Temp:  [97.4 °F (36.3 °C)-98.3 °F (36.8 °C)] 97.7 °F (36.5 °C)  Pulse:  [] 111  Resp:  [15-27] 18  SpO2:  [97 %-100 %] 97 %  BP: (120-155)/(79-97) 155/89     Weight: (!) 137 kg (302 lb 0.5 oz)  Body mass index is 47.3 kg/m².    Intake/Output Summary (Last 24 hours) at 7/5/2024 1546  Last data filed at 7/5/2024 0522  Gross per 24 hour   Intake 240 ml   Output 534 ml   Net -294 ml         Physical Exam  HENT:      Head: Normocephalic.      Nose: Nose normal.   Eyes:      Pupils: Pupils are equal, round, and reactive to light.   Cardiovascular:      Rate and Rhythm: Tachycardia present. Rhythm irregular.   Pulmonary:      Breath sounds: Rales present.   Abdominal:      General: Abdomen is flat. There is no distension.      Palpations: Abdomen is soft.   Musculoskeletal:      Cervical back: Neck supple.      Right lower leg: Edema present.      Left lower leg: Edema present.   Neurological:      General: No focal deficit present.      Mental Status: She is alert.      Comments: A/Ox2              Significant Labs: All pertinent labs within the past 24 hours have been reviewed.    Significant Imaging: I have reviewed all pertinent imaging results/findings within the past 24 hours.

## 2024-07-05 NOTE — ASSESSMENT & PLAN NOTE
"Prior to admission to [facility], Echo 6/19/24 w/ EF 40-45% w/ venous pressure 15mmHg. Following PEA arrest repeat echo 6/24 EF 20-25% with [facility] echo confirming reduced EF. At [facility] noted "Patient had significant elevation of troponin cardiology consulted. No acute intervention recommended because of clinical condition and later patient is not keen to do the angiogram was stress test."      -Cardiology consulted, want DCCV followed by ischemic work-up. Patient so far declining this intervention.  -Diuresis w/ 200mg IV lasix q12hr  -Metoprolol 50mg TID, plan to transition 200mg succinate daily          "

## 2024-07-05 NOTE — ASSESSMENT & PLAN NOTE
Presented with concerns of line infection. Cultures negative. Vitals stable.  - Discontinue cefepime

## 2024-07-05 NOTE — ASSESSMENT & PLAN NOTE
73F w/ ESRD, HFrEF (LVEF 25%, no ICD yet), CAD s/p PCI (oLCX, mLCx, and pLAD 2020), paroxysmal Atrial fibrillation, hx RCC s/p R nephrectomy, T2DM obesity, and initial outside hospitalization for PD catheter malfunction and ADHF iso of volume overload complicated by PEA arrest with conversion to VT after epi and cardioversion => ROSC and recurrent AF w/ RVR.    At this juncture patient relatively Given advanced HF and poor functional status, at this juncture patient is back on PD. Rates are mostly controlled on beta blocker. Given functional status, advanced HF, and low engagement do not believe benefits outweigh risks of ICD.    Recommendations:  [ ] Can increase metoprolol for rate control. Would consider switching to metoprolol succinate tomorrow for simplicity of daily dosing with metoprolol 150mg succinate or 200mg succinate given robust BP  [ ] At this time patient does not want cardioversion at all. Despite explanation of benefits for cardiac function and angiogram quality patient does not want any more interventions  [ ] recommend continuing anticoagulation given CHADS2-VASc > 2, would discharge on eliquis  [ ] EP team will sign off at this time, please let us know if patient changes her mind regarding DCCV

## 2024-07-05 NOTE — ASSESSMENT & PLAN NOTE
Patient with Hypoxic Respiratory failure which is Acute on chronic.  she is not on home oxygen. Supplemental oxygen was provided and noted- 99% 2LNCOxygen Concentration (%):  [28] 28    .   Signs/symptoms of respiratory failure include- increased work of breathing and lethargy. Contributing diagnoses includes - CHF, Obesity Hypoventilation, and ESRD  Labs and images were reviewed. Patient Has not had a recent ABG. Will treat underlying causes and adjust management of respiratory failure as follows-     - BiPAP nightly if agreeable  - 200mg IV lasix q12hr

## 2024-07-05 NOTE — NURSING
Maria Luisa, patient's daughter called and was updated. Daughter is aware that Dr. Miranda with the medicine team is at bedside updating patient and family. Patient is stable. Patient is awake, alert and oriented. VSS.

## 2024-07-05 NOTE — ASSESSMENT & PLAN NOTE
73F w/ ESRD, HFrEF (LVEF 25%, no ICD yet), CAD s/p PCI (oLCX, mLCx, and pLAD 2020), paroxysmal Atrial fibrillation, hx RCC s/p R nephrectomy, T2DM obesity, and initial outside hospitalization for PD catheter malfunction and ADHF iso of volume overload complicated by PEA arrest with conversion to VT after epi and cardioversion => ROSC and recurrent AF w/ RVR.    At this juncture patient relatively Given advanced HF and poor functional status, at this juncture patient is back on PD. Rates are mostly controlled on beta blocker. Given functional status, advanced HF, and low engagement do not believe benefits outweigh risks of ICD.    Recommendations:  [ ] Can increase metoprolol to 50mg tartrate q8hrs  [ ] Patient needs to be therapeutically anticoagulated prior to cardioversion  [ ] if still in RVR and therapeutically anticoagulated after volume optimization/dialysis can consider MILLY/DCCV

## 2024-07-05 NOTE — ASSESSMENT & PLAN NOTE
Chronic, controlled. Latest blood pressure and vitals reviewed-     Temp:  [97.4 °F (36.3 °C)-98.3 °F (36.8 °C)]   Pulse:  []   Resp:  [15-27]   BP: (120-155)/(79-97)   SpO2:  [97 %-100 %] .   Home meds for hypertension were reviewed and noted below.   Hypertension Medications               amLODIPine (NORVASC) 10 MG tablet Take 10 mg by mouth once daily.    carvediloL (COREG) 25 MG tablet Take 1 tablet (25 mg total) by mouth 2 (two) times daily.    cloNIDine (CATAPRES) 0.1 MG tablet Take 1 tablet (0.1 mg total) by mouth 3 (three) times daily as needed (PRN SBP > 165 mmHg).    cloNIDine 0.1 mg/24 hr td ptwk (CATAPRES) 0.1 mg/24 hr Place 1 patch onto the skin every 7 days.    furosemide (LASIX) 40 MG tablet Take 40 mg by mouth once daily.    nitroGLYCERIN (NITROSTAT) 0.4 MG SL tablet Place 1 tablet (0.4 mg total) under the tongue every 5 (five) minutes as needed for Chest pain.            While in the hospital, will manage blood pressure as follows; Adjust home antihypertensive regimen as follows- only metoprolol tartrate for now. Observe and resume home meds as needed.    Will utilize p.r.n. blood pressure medication only if patient's blood pressure greater than 180/110 and she develops symptoms such as worsening chest pain or shortness of breath.

## 2024-07-05 NOTE — PROGRESS NOTES
[facility] - Cardiology Stepdown  Nephrology  Progress Note    Patient Name: Juhi Taylor  MRN: [id number]  Admission Date: 7/3/2024  Hospital Length of Stay: 2 days  Attending Provider: Herb Coreas MD   Primary Care Physician: Tony Sadler MD  Principal Problem:Hypoxic respiratory failure    Subjective:     HPI: 74-year-old female with prior history of CKD stage 5 with recent PD catheter placement on 6/4, RCC s/p right nephrectomy, T2DM, obesity, CAD s/p PCI to Lcx and LAD in 11/2020 and HFmREF who is admitted as a transfer from [facility] for higher level of care and further cardiac evaluation. Nephrology consulted for hemodialysis.     She initially presented on 6/18/24 to [facility] for hypervolemia in the setting of CKD 4-5 for which she had undergone elective PD catheter placement complicated by catheter site leaking. She had a complex hospital course, was initially placed on furosemide gtt without significant improvement and later underwent placement of tunneled line for HD with volume removal. During her second dialysis session she went into PEA arrest for which she was resuscitated, intubated and transferred to ICU. She was noted to have elevated troponin and subsequent TTE found reduced EF from 40-45% to 20-25% for which coronary angiography was recommended. Her hospital course was also complicated by dialysis line infection, significant bleeding around the catheter site, bradycardia and subsequent atrial fibrillation with RVR. Given the catheter site bleeding, anticoagulation was temporarily held. She has reported allergy to amiodarone, was initially placed on diltiazem for atrial fibrillation despite reduced EF. She discussed GOC at outside hospital where it appears that she lacked capacity per note and decided to pursue higher level of care at [facility]. At that time, gen surgery was planning to place a left tunneled catheter due to concerns of the right side not being suitable    Upon admit, patient  appeared drowsy but conversant. Daughter reports that she typically gets lethargic 2/2 to the volume overload due to not receiving dialysis. Patient reported b/l left lower extremity pain. Daughter concerned about her not having dialysis in 4 days and wants a session today.     Interval History: Seen this morning. Discussed peritoneal dialysis w family. They are concerned about her peripheral edema. She still feels a bit short of breath, although improved. Patient tolerating PD well.     Review of patient's allergies indicates:   Allergen Reactions    Percocet [oxycodone-acetaminophen] Itching    Amiodarone analogues      Itching      Irbesartan Swelling    Januvia [sitagliptin]     Jardiance [empagliflozin]      Leg cramps    Lipitor [atorvastatin] Other (See Comments)     Severe leg pain    Linaclotide Other (See Comments) and Nausea And Vomiting     Does not remember    Lubiprostone Other (See Comments) and Palpitations     Does not remember     Current Facility-Administered Medications   Medication Frequency    acetaminophen tablet 650 mg Q6H PRN    albuterol sulfate nebulizer solution 2.5 mg Q4H PRN    aspirin EC tablet 81 mg Daily    ceFEPIme (MAXIPIME) 1 g in D5W 100 mL IVPB (MB+) Q24H    dextromethorphan-guaiFENesin  mg/5 ml liquid 5 mL Q4H PRN    famotidine tablet 20 mg Daily    fluconazole tablet 200 mg Every other day    furosemide (Lasix) 200 mg in 0.9% NaCl 100 mL IVPB Q12H    gentamicin 0.1 % ointment TID    heparin 25,000 units in dextrose 5% (100 units/ml) IV bolus from bag LOW INTENSITY nomogram - [facility] PRN    heparin 25,000 units in dextrose 5% (100 units/ml) IV bolus from bag LOW INTENSITY nomogram - [facility] PRN    heparin 25,000 units in dextrose 5% 250 mL (100 units/mL) infusion LOW INTENSITY nomogram - [facility] Continuous    metoprolol tartrate (LOPRESSOR) tablet 50 mg TID    ondansetron injection 4 mg Q6H PRN    simethicone chewable tablet 80 mg TID PRN    vancomycin - pharmacy to dose pharmacy to  manage frequency       Objective:     Vital Signs (Most Recent):  Temp: 97.8 °F (36.6 °C) (07/05/24 0808)  Pulse: (!) 131 (07/05/24 0808)  Resp: 18 (07/05/24 0808)  BP: (!) 129/96 (07/05/24 0808)  SpO2: 99 % (07/05/24 0808) Vital Signs (24h Range):  Temp:  [97.4 °F (36.3 °C)-98.3 °F (36.8 °C)] 97.8 °F (36.6 °C)  Pulse:  [] 131  Resp:  [15-27] 18  SpO2:  [98 %-100 %] 99 %  BP: (120-141)/(79-97) 129/96     Weight: (!) 137 kg (302 lb 0.5 oz) (07/03/24 1422)  Body mass index is 47.3 kg/m².  Body surface area is 2.54 meters squared.    I/O last 3 completed shifts:  In: 360 [P.O.:360]  Out: 1816 [Urine:300; Other:1516]     Physical Exam  Constitutional:       General: She is not in acute distress.     Appearance: Normal appearance. She is obese.   HENT:      Head: Normocephalic and atraumatic.      Mouth/Throat:      Mouth: Mucous membranes are moist.      Pharynx: Oropharynx is clear.   Cardiovascular:      Rate and Rhythm: Normal rate and regular rhythm.      Heart sounds: Normal heart sounds.   Pulmonary:      Effort: Pulmonary effort is normal.      Breath sounds: Normal breath sounds.   Abdominal:      Palpations: Abdomen is soft.   Musculoskeletal:      Right lower leg: Edema (2+) present.      Left lower leg: Edema (2+) present.   Skin:     General: Skin is warm and dry.   Neurological:      General: No focal deficit present.      Mental Status: She is alert and oriented to person, place, and time.          Significant Labs:  All labs within the past 24 hours have been reviewed.     Significant Imaging:  All imaging with the past 24 hours have been reviewed.  Assessment/Plan:     Renal/  CKD stage 4 secondary to hypertension  74-year-old female with DMT2, ESRD on HD, pAF, CAD s/p PCI and HFrEF is admitted as a transfer for acute HFrEF in setting of CKD5 and recent PEA arrest during dialysis. Recent PD catheter placement on 6/4 but concerned for a possibly leak from cath. Unable to initiate dialysis for 2  weeks PTA to OSH due to insurance issues. Hospital course complicated by infected tunneled cath removed on 6/30. Transferred here for higher level of care. Initial plan at OSH was to replace tunneled catheter on the left side due to concerns to that right side may not by suitable. Peritoneal fluid studies negative for peritonitis.     Plan:  - Continue daily PD, will attempt to pull more fluid off today w 2.5%  - Would be cautious to initiate HD given her hemodynamic instability  - continue IV Lasix 160-200 q12hrs- 300 ccs of UOP noted  - Fluconazole per ISPD guidelines as prophylaxis  - Trend RFP; replete electrolytes PRN for goal K > 4, Phos > 3, Mg > 2  - Strict I&Os  - Avoid nephrotoxic agents  - Renally adjust medications        Thank you for your consult. I will follow-up with patient. Please contact us if you have any additional questions.    Paulo Alicea MD  Nephrology  [facility] - Cardiology Stepdown

## 2024-07-05 NOTE — ASSESSMENT & PLAN NOTE
Patient with Paroxysmal (<7 days) atrial fibrillation which is controlled currently with  diltiazem . Patient is currently in sinus rhythm.[id number] Score: 4. Pt. Has refused eliquis. Currently on heparin drip.    -cards & EP following, appreciate recs  -patient declined heparin drip and is a hard stick, will restart eliquis  -metoprolol for rate control  -Cardiology to consider cardioversion w/ MILLY, pacemaker once euvolemic and if patient is agreeable

## 2024-07-05 NOTE — PLAN OF CARE
[facility] - Cardiology Stepdown  Initial Discharge Assessment       Primary Care Provider: Tony Sadler MD    Admission Diagnosis: Atrial fibrillation with RVR [I48.91]    Admission Date: 7/3/2024  Expected Discharge Date: 7/8/2024    Transition of Care Barriers: DIalysis placement issues    Payor: MEDICARE / Plan: MEDICARE PART A & B / Product Type: Government /     Extended Emergency Contact Information  Primary Emergency Contact: Maria Luisa López   [place]  Mobile Phone: [phone]  Relation: Daughter  Secondary Emergency Contact: Nell Taylor  Mobile Phone: [phone]  Relation: Daughter    Discharge Plan A: Rehab  Discharge Plan B: Skilled Nursing Facility      [id number] - [place], [place] - [address]  [address]  [facility] [address]  Phone: [phone] Fax: [phone]    [facility] - [place], [place] - [address]  [address]  [facility] [address]  Phone: [phone] Fax: [phone]    [id number] - [place], [place] - [address] [facility]  [address]  [facility] [address]  Phone: [phone] Fax: [phone]      Initial Assessment (most recent)       Adult Discharge Assessment - 07/05/24 1628          Discharge Assessment    Assessment Type Discharge Planning Assessment     Confirmed/corrected address, phone number and insurance Yes     Confirmed Demographics Correct on Facesheet     Source of Information family     Communicated HARINI with patient/caregiver Date not available/Unable to determine     Reason For Admission Hypoxic respiratory failure     People in Home child(stan), adult;grandchild(stan)     Facility Arrived From: [facility]     Do you expect to return to your current living situation? Yes     Do you have help at home or someone to help you manage your care at home? Yes     Who are your caregiver(s) and their phone number(s)? Olesya Taylor (daughter) [phone]      Prior to hospitilization cognitive status: Not Oriented to Time     Current cognitive status: Not Oriented to Place;Not Oriented to Time     Walking or Climbing Stairs Difficulty yes     Walking or Climbing Stairs ambulation difficulty, requires equipment;ambulation difficulty, assistance 1 person     Mobility Management rollator     Dressing/Bathing Difficulty yes     Dressing/Bathing bathing difficulty, requires equipment;bathing difficulty, assistance 1 person     Dressing/Bathing Management shower chair     Equipment Currently Used at Home rollator     Readmission within 30 days? Yes     Patient currently being followed by outpatient case management? No     Do you currently have service(s) that help you manage your care at home? No     Do you take prescription medications? Yes     Do you have prescription coverage? Yes     Coverage Medicare A&B and medicaid of [facility]     Do you have any problems affording any of your prescribed medications? No     Is the patient taking medications as prescribed? yes     Who is going to help you get home at discharge? daughter     How do you get to doctors appointments? family or friend will provide     Are you on dialysis? Yes     Dialysis Name and Scheduled days was supposed to start peritoneal dialysis and was having problems with port as stated per daughter and got the dialysis at [facility] license of [facility] [place] the [place].     Do you take coumadin? No     Discharge Plan A Rehab     Discharge Plan B Skilled Nursing Facility     DME Needed Upon Discharge  other (see comments)   TBD    Discharge Plan discussed with: Adult children;Patient   Patient confused and not answering questions other than her first name and month and day of birth. called daughter for assessment .    Transition of Care Barriers DIalysis placement issues        Physical Activity    On average, how many days per week do you engage in moderate to strenuous exercise (like a brisk walk)? 0 days     On average, how many  minutes do you engage in exercise at this level? 0 min        Financial Resource Strain    How hard is it for you to pay for the very basics like food, housing, medical care, and heating? Not very hard        Housing Stability    In the last 12 months, was there a time when you were not able to pay the mortgage or rent on time? No     At any time in the past 12 months, were you homeless or living in a shelter (including now)? No        Transportation Needs    Has the lack of transportation kept you from medical appointments, meetings, work or from getting things needed for daily living? No        Food Insecurity    Within the past 12 months, you worried that your food would run out before you got the money to buy more. Never true     Within the past 12 months, the food you bought just didn't last and you didn't have money to get more. Never true        Stress    Do you feel stress - tense, restless, nervous, or anxious, or unable to sleep at night because your mind is troubled all the time - these days? Patient unable to answer   patient did not answer when asked       Social Isolation    How often do you feel lonely or isolated from those around you?  Patient unable to answer   would not answer       Alcohol Use    Q1: How often do you have a drink containing alcohol? Never     Q2: How many drinks containing alcohol do you have on a typical day when you are drinking? Patient does not drink     Q3: How often do you have six or more drinks on one occasion? Never        Utilities    In the past 12 months has the electric, gas, oil, or water company threatened to shut off services in your home? No        Health Literacy    How often do you need to have someone help you when you read instructions, pamphlets, or other written material from your doctor or pharmacy? Never        OTHER    Name(s) of People in Home eric Taylor  [phone]                      Patient transferred from [facility] for  "higher level of care. CM met with the patient at the bedside todiscussed the discharge plan, patient only able to verify her first name, date of birth and month, she was unable to answer any more questions. Nurse at bedside. Did call rodger Camejo for rest of assessment . Placed the discharge booklet at the bedside and placed contact numbers on the white board in the room. Patient alert but confused and sitting up in bed.  Patient"s daughter verified her name , , PCP, Insurance and Pharmacy . Stated stated she lives with her Zoya and the grand children in a single story house and has 1 steps to point of entry . She is not on coumadin and  she is a peritoneal dialysis  patient but was not set up at home and was going in to have line changed again due to problems with the line and then she was transferred from [place] to here. DME's include:Rollator, shower chair  and a C-PAP she no longer uses. .  Stated she takes  medication as prescribed and has no problems getting  medication.  Daughter stated she uses [facility] pharmacy in [facility] on [address].     Discharge Plan A and Plan B have been determined by review of patient's clinical status, future medical and therapeutic needs, and coverage/benefits for post-acute care in coordination with multidisciplinary team members.    Jonathan Stafford RN, CM        [phone]            "

## 2024-07-05 NOTE — ASSESSMENT & PLAN NOTE
Body mass index is 47.3 kg/m². Morbid obesity complicates all aspects of disease management from diagnostic modalities to treatment. Weight loss encouraged and health benefits explained to patient.

## 2024-07-05 NOTE — SUBJECTIVE & OBJECTIVE
Interval History:   - heparin supra-therapeutic  - 234 cc's removed with PD  - states she does not want to speak with psychiatry to me    Review of Systems   Unable to perform ROS: Other   Psychiatric/Behavioral:  Positive for depression.    - patient request not to speak to provider    Objective:     Vital Signs (Most Recent):  Temp: 98.2 °F (36.8 °C) (07/05/24 0413)  Pulse: (!) 112 (07/05/24 0413)  Resp: 17 (07/05/24 0413)  BP: 120/81 (07/05/24 0413)  SpO2: 100 % (07/05/24 0413) Vital Signs (24h Range):  Temp:  [97.4 °F (36.3 °C)-98.3 °F (36.8 °C)] 98.2 °F (36.8 °C)  Pulse:  [] 112  Resp:  [15-27] 17  SpO2:  [98 %-100 %] 100 %  BP: (120-141)/(79-97) 120/81     Weight: (!) 137 kg (302 lb 0.5 oz)  Body mass index is 47.3 kg/m².     SpO2: 100 %        Physical Exam  Constitutional:       Appearance: She is obese. She is ill-appearing.   HENT:      Head: Normocephalic.      Mouth/Throat:      Mouth: Mucous membranes are moist.   Eyes:      Extraocular Movements: Extraocular movements intact.   Cardiovascular:      Rate and Rhythm: Tachycardia present. Rhythm irregular.   Pulmonary:      Effort: Pulmonary effort is normal. No respiratory distress.   Abdominal:      General: There is no distension.      Palpations: Abdomen is soft.   Neurological:      Mental Status: She is alert.   Psychiatric:         Mood and Affect: Mood is depressed. Affect is flat.         Behavior: Behavior is withdrawn.     - exam limited per patient request       Significant Labs: EP:   Recent Labs   Lab 07/03/24  0727 07/03/24  0809 07/04/24  0528 07/04/24  0808     --   --  137   K 5.0  --   --  4.6     --   --  106   CO2 18*  --   --  18*   *  --   --  133*   BUN 88*  --   --  91*   CREATININE 7.0*  --   --  6.9*   CALCIUM 9.3  --   --  9.2   PROT 6.3  --   --  6.3   ALBUMIN 2.6*  --   --  2.6*   BILITOT 0.8  --   --  0.8   ALKPHOS 101  --   --  97   AST 15  --   --  17   ALT 11  --   --  12   ANIONGAP 12  --   --   13   WBC 9.05 10.33 9.54  9.54  --    HGB 10.9* 10.6* 10.7*  10.7*  --    HCT 36.4* 35.0* 34.8*  34.8*  --     260 284  284  --    INR  --  1.0  --   --     and All pertinent lab results from the last 24 hours have been reviewed.    Significant Imaging: Echocardiogram: Transthoracic echo (TTE) complete (Cupid Only):   Results for orders placed or performed during the hospital encounter of 07/03/24   Echo   Result Value Ref Range    RA Width 4.57 cm    LA volume (mod) 71.24 cm3    Left Atrium Major Axis 6.52 cm    Left Atrium Minor Axis 6.53 cm    RA Major Axis 6.28 cm    LV Diastolic Volume 169.89 mL    LV Systolic Volume 124.41 mL    PV Peak D Harley 0.23 m/s    PV Peak S Harley 0.14 m/s    MV stenosis pressure 1/2 time 53.56 ms    TR Max Harley 3.39 m/s    MV Peak E Harley 1.19 m/s    Mr max harley 0.04 m/s    Ao VTI 19.89 cm    Ao peak harley 1.21 m/s    LVOT peak VTI 14.22 cm    LVOT peak harley 0.89 m/s    LVOT diameter 2.06 cm    IVRT 114.18 msec    E wave deceleration time 184.67 msec    AV mean gradient 3 mmHg    RV- boothe basal diam 4.4 cm    TAPSE 1.29 cm    LA size 4.90 cm    Ascending aorta 3.04 cm    STJ 2.64 cm    Sinus 3.16 cm    LVIDs 5.11 (A) 2.1 - 4.0 cm    Posterior Wall 1.01 0.6 - 1.1 cm    IVS 0.92 0.6 - 1.1 cm    LVIDd 5.85 3.5 - 6.0 cm    TDI LATERAL 0.08 m/s    LA WIDTH 4.78 cm    TDI SEPTAL 0.05 m/s    LV LATERAL E/E' RATIO 14.88 m/s    LV SEPTAL E/E' RATIO 23.80 m/s    FS 13 (A) 28 - 44 %    LA volume 129.90 cm3    LV mass 225.83 g    Left Ventricle Relative Wall Thickness 0.35 cm    AV valve area 2.38 cm²    AV Velocity Ratio 0.74     AV index (prosthetic) 0.71     MV valve area p 1/2 method 4.11 cm2    Mean e' 0.07 m/s    Pulm vein S/D ratio 0.61     LVOT area 3.3 cm2    LVOT stroke volume 47.37 cm3    AV peak gradient 6 mmHg    E/E' ratio 18.31 m/s    Triscuspid Valve Regurgitation Peak Gradient 46 mmHg    MARKO by Velocity Ratio 2.45 cm²    BSA 2.54 m2    LV Systolic Volume Index 51.6 mL/m2    LV  Diastolic Volume Index 70.49 mL/m2    LV Mass Index 94 g/m2    LA Volume Index 53.9 mL/m2    LA Volume Index (Mod) 29.6 mL/m2    ZLVIDS -2.13     ZLVIDD -6.48     TV resting pulmonary artery pressure 61 mmHg    RV TB RVSP 18 mmHg    Est. RA pres 15 mmHg    Narrative      Left Ventricle: The left ventricle is moderately dilated. Normal wall   thickness. Global hypokinesis present. There is severely reduced systolic   function with a visually estimated ejection fraction of 20 - 25%.    Right Ventricle: Mild right ventricular enlargement. Wall thickness is   normal. Right ventricle wall motion has global hypokinesis. Systolic   function is moderately reduced.    Left Atrium: Left atrium is severely dilated.    Right Atrium: Right atrium is severely dilated.    Aortic Valve: The aortic valve is a trileaflet valve. There is mild   aortic valve sclerosis.    Mitral Valve: There is mild regurgitation.    Tricuspid Valve: There is severe regurgitation.    Pulmonary Artery: There is moderate pulmonary hypertension. The   estimated pulmonary artery systolic pressure is 61 mmHg.    IVC/SVC: Elevated venous pressure at 15 mmHg.

## 2024-07-05 NOTE — PROGRESS NOTES
[facility] - Cardiology Stepdown  Cardiac Electrophysiology  Progress Note    Admission Date: 7/3/2024  Code Status: Full Code   Attending Physician: Herb Coreas MD   Expected Discharge Date: 7/8/2024  Principal Problem:Hypoxic respiratory failure    Subjective:     Interval History:   - heparin supra-therapeutic  - 234 cc's removed with PD  - states she does not want to speak with psychiatry to me    Review of Systems   Unable to perform ROS: Other   Psychiatric/Behavioral:  Positive for depression.    - patient request not to speak to provider    Objective:     Vital Signs (Most Recent):  Temp: 98.2 °F (36.8 °C) (07/05/24 0413)  Pulse: (!) 112 (07/05/24 0413)  Resp: 17 (07/05/24 0413)  BP: 120/81 (07/05/24 0413)  SpO2: 100 % (07/05/24 0413) Vital Signs (24h Range):  Temp:  [97.4 °F (36.3 °C)-98.3 °F (36.8 °C)] 98.2 °F (36.8 °C)  Pulse:  [] 112  Resp:  [15-27] 17  SpO2:  [98 %-100 %] 100 %  BP: (120-141)/(79-97) 120/81     Weight: (!) 137 kg (302 lb 0.5 oz)  Body mass index is 47.3 kg/m².     SpO2: 100 %        Physical Exam  Constitutional:       Appearance: She is obese. She is ill-appearing.   HENT:      Head: Normocephalic.      Mouth/Throat:      Mouth: Mucous membranes are moist.   Eyes:      Extraocular Movements: Extraocular movements intact.   Cardiovascular:      Rate and Rhythm: Tachycardia present. Rhythm irregular.   Pulmonary:      Effort: Pulmonary effort is normal. No respiratory distress.   Abdominal:      General: There is no distension.      Palpations: Abdomen is soft.   Neurological:      Mental Status: She is alert.   Psychiatric:         Mood and Affect: Mood is depressed. Affect is flat.         Behavior: Behavior is withdrawn.     - exam limited per patient request       Significant Labs: EP:   Recent Labs   Lab 07/03/24  0727 07/03/24  0809 07/04/24  0528 07/04/24  0808     --   --  137   K 5.0  --   --  4.6     --   --  106   CO2 18*  --   --  18*   *  --    --  133*   BUN 88*  --   --  91*   CREATININE 7.0*  --   --  6.9*   CALCIUM 9.3  --   --  9.2   PROT 6.3  --   --  6.3   ALBUMIN 2.6*  --   --  2.6*   BILITOT 0.8  --   --  0.8   ALKPHOS 101  --   --  97   AST 15  --   --  17   ALT 11  --   --  12   ANIONGAP 12  --   --  13   WBC 9.05 10.33 9.54  9.54  --    HGB 10.9* 10.6* 10.7*  10.7*  --    HCT 36.4* 35.0* 34.8*  34.8*  --     260 284  284  --    INR  --  1.0  --   --     and All pertinent lab results from the last 24 hours have been reviewed.    Significant Imaging: Echocardiogram: Transthoracic echo (TTE) complete ([facility] Only):   Results for orders placed or performed during the hospital encounter of 07/03/24   Echo   Result Value Ref Range    RA Width 4.57 cm    LA volume (mod) 71.24 cm3    Left Atrium Major Axis 6.52 cm    Left Atrium Minor Axis 6.53 cm    RA Major Axis 6.28 cm    LV Diastolic Volume 169.89 mL    LV Systolic Volume 124.41 mL    PV Peak D Harley 0.23 m/s    PV Peak S Harley 0.14 m/s    MV stenosis pressure 1/2 time 53.56 ms    TR Max Harley 3.39 m/s    MV Peak E Harley 1.19 m/s    Mr max harley 0.04 m/s    Ao VTI 19.89 cm    Ao peak harley 1.21 m/s    LVOT peak VTI 14.22 cm    LVOT peak harley 0.89 m/s    LVOT diameter 2.06 cm    IVRT 114.18 msec    E wave deceleration time 184.67 msec    AV mean gradient 3 mmHg    RV- boothe basal diam 4.4 cm    TAPSE 1.29 cm    LA size 4.90 cm    Ascending aorta 3.04 cm    STJ 2.64 cm    Sinus 3.16 cm    LVIDs 5.11 (A) 2.1 - 4.0 cm    Posterior Wall 1.01 0.6 - 1.1 cm    IVS 0.92 0.6 - 1.1 cm    LVIDd 5.85 3.5 - 6.0 cm    TDI LATERAL 0.08 m/s    LA WIDTH 4.78 cm    TDI SEPTAL 0.05 m/s    LV LATERAL E/E' RATIO 14.88 m/s    LV SEPTAL E/E' RATIO 23.80 m/s    FS 13 (A) 28 - 44 %    LA volume 129.90 cm3    LV mass 225.83 g    Left Ventricle Relative Wall Thickness 0.35 cm    AV valve area 2.38 cm²    AV Velocity Ratio 0.74     AV index (prosthetic) 0.71     MV valve area p 1/2 method 4.11 cm2    Mean e' 0.07 m/s    Pulm vein  S/D ratio 0.61     LVOT area 3.3 cm2    LVOT stroke volume 47.37 cm3    AV peak gradient 6 mmHg    E/E' ratio 18.31 m/s    Triscuspid Valve Regurgitation Peak Gradient 46 mmHg    MARKO by Velocity Ratio 2.45 cm²    BSA 2.54 m2    LV Systolic Volume Index 51.6 mL/m2    LV Diastolic Volume Index 70.49 mL/m2    LV Mass Index 94 g/m2    LA Volume Index 53.9 mL/m2    LA Volume Index (Mod) 29.6 mL/m2    ZLVIDS -2.13     ZLVIDD -6.48     TV resting pulmonary artery pressure 61 mmHg    RV TB RVSP 18 mmHg    Est. RA pres 15 mmHg    Narrative      Left Ventricle: The left ventricle is moderately dilated. Normal wall   thickness. Global hypokinesis present. There is severely reduced systolic   function with a visually estimated ejection fraction of 20 - 25%.    Right Ventricle: Mild right ventricular enlargement. Wall thickness is   normal. Right ventricle wall motion has global hypokinesis. Systolic   function is moderately reduced.    Left Atrium: Left atrium is severely dilated.    Right Atrium: Right atrium is severely dilated.    Aortic Valve: The aortic valve is a trileaflet valve. There is mild   aortic valve sclerosis.    Mitral Valve: There is mild regurgitation.    Tricuspid Valve: There is severe regurgitation.    Pulmonary Artery: There is moderate pulmonary hypertension. The   estimated pulmonary artery systolic pressure is 61 mmHg.    IVC/SVC: Elevated venous pressure at 15 mmHg.       Assessment and Plan:     Atrial fibrillation with RVR  73F w/ ESRD, HFrEF (LVEF 25%, no ICD yet), CAD s/p PCI (oLCX, mLCx, and pLAD 2020), paroxysmal Atrial fibrillation, hx RCC s/p R nephrectomy, T2DM obesity, and initial outside hospitalization for PD catheter malfunction and ADHF iso of volume overload complicated by PEA arrest with conversion to VT after epi and cardioversion => ROSC and recurrent AF w/ RVR.    At this juncture patient relatively Given advanced HF and poor functional status, at this juncture patient is back on  PD. Rates are mostly controlled on beta blocker. Given functional status, advanced HF, and low engagement do not believe benefits outweigh risks of ICD.    Recommendations:  [ ] Can increase metoprolol for rate control. Would consider switching to metoprolol succinate tomorrow for simplicity of daily dosing with metoprolol 150mg succinate or 200mg succinate given robust BP  [ ] At this time patient does not want cardioversion at all. Despite explanation of benefits for cardiac function and angiogram quality patient does not want any more interventions  [ ] recommend continuing anticoagulation given CHADS2-VASc > 2, would discharge on eliquis  [ ] EP team will sign off at this time, please let us know if patient changes her mind regarding DCCV        August Neri MD  Cardiac Electrophysiology  [facility] - [facility] Stepdown

## 2024-07-05 NOTE — PROGRESS NOTES
Therapy with vancomycin complete and/or consult discontinued by provider.  Pharmacy will sign off, please re-consult as needed.      Louise Goode, PharmD, BCPS  Clinical Pharmacist - Internal Medicine   [phone]

## 2024-07-05 NOTE — ASSESSMENT & PLAN NOTE
Tunneled line removed due to possible infection.  BMP reviewed- noted Estimated Creatinine Clearance: 10.5 mL/min (A) (based on SCr of 7 mg/dL (H)). according to latest data. Based on current GFR, CKD stage is stage 5 - GFR < 15.  Monitor UOP and serial BMP and adjust therapy as needed. Renally dose meds. Avoid nephrotoxic medications and procedures.    -nightly PD per nephrology  -200mg IV lasix q12hr  -fluid restriction 1.5L  -strict I/Os

## 2024-07-05 NOTE — PROGRESS NOTES
07/04/24 2100   Vitals   BP (!) 138/97   Temp 98.3 °F (36.8 °C)   Temp Source Oral   Pulse 95   Resp 18   SpO2 98 %        Peritoneal Dialysis Catheter 06/04/24 Other Left lower abdomen   Placement Date: 06/04/24   Present Prior to Hospital Arrival?: No  Inserted by: MD  Catheter Type: (c) Other  Catheter Location: Left lower abdomen   Site Assessment Clean;Dry;Intact   Dressing Intervention Sterile dressing change   Status Accessed   Dressing Status Clean;Dry;Intact   Dressing Gauze   Securement secured to abdomen with tape   Clamp Status unclamped   Peritoneal Dialysis   Exchange Type Cycler   Peritoneal Treatment Status Started   Dianeal Solution Dextrose 1.5% in 6000 mL   Cycler Peritoneal Dialysis   Effluent Appearance Yellow   Number of Cycles 4   Fill Volume (mL) 1000 mL   Cycler Treatment Comments CCPD tx started     Report received from the primary RN.  [facility]PD tx started aseptically.  Clear yellow effluent noted.

## 2024-07-05 NOTE — ACP (ADVANCE CARE PLANNING)
I spoke with the patient's daughter Maria Luisa over the phone. I updated on her mother's current clinical status as well as discussions the team has had with her mother in the morning. Maria Luisa shared with me that her mother was very independent prior to the events of this hospitalization and always made decisions for herself. After her cardiac arrest, the patient told Maria Luisa that she was unhappy with some of the procedures that she went through and would like to continue to make decisions for herself.     Maria Luisa then shared with me that her mother's biggest goals are to be at home and to have mobility. She states her mother currently believes further interventions would not help her achieve her goals. Maria Luisa states that her mother initially did not want to be transferred from [facility] but signed papers after discussion with other family members who want her to undergo further interventions. Per Maria Luisa there is some discordance in what family wants and what the patient wants. The patient does not have an assigned MPOA.      Maria Luisa also informed me they had good report with Dr. Romero and the [facility] palliative team and she is amenable to speak with our palliative team about options to get her mother home. She would like to have a family meeting with palliative care tomorrow in the afternoon either in person or over the phone. I will place a palliative care consult for Ms. Taylor.

## 2024-07-05 NOTE — PROGRESS NOTES
07/05/24 0522        Peritoneal Dialysis Catheter 06/04/24 Other Left lower abdomen   Placement Date: 06/04/24   Present Prior to Hospital Arrival?: No  Inserted by: MD  Catheter Type: (c) Other  Catheter Location: Left lower abdomen   Clamp Status clamped   Peritoneal Dialysis   Exchange Type Cycler   Peritoneal Treatment Status Completed   Cycler Peritoneal Dialysis   Initial Drain Volume (mL) 235 mL   Effluent Appearance Yellow   Total Volume (mL) 3999 mL   Average Dwell Time (specify hr/min) 90   Cycler PD Total UF (mL) 234 mL   Cycler Treatment Comments [facility]CPD tx completed.     [facility]PD tx completed and pt tolerated well.  Report given to the primary RN.

## 2024-07-05 NOTE — CLINICAL REVIEW
"RAPID RESPONSE NURSE CHART REVIEW        Chart Reviewed: 07/05/2024, 8:15 AM    MRN: [id number]  Bed: 330/330 A    Dx: Hypoxic respiratory failure    Juhi Taylor has a past medical history of Anticoagulant long-term use, Arthritis, Breast cancer, Cancer of kidney, CHF (congestive heart failure), Coronary artery disease, Depression, Diabetes mellitus, Diastolic heart failure secondary to hypertension, Gout, Hyperlipemia, Hypertension, Hypertrophy of nasal turbinates, Kidney mass, Levoscoliosis, Lung nodule, Multiple thyroid nodules, NICOLE (obstructive sleep apnea), and Pulmonary hypertension.    Last VS: BP (!) 129/96   Pulse (!) 131   Temp 97.8 °F (36.6 °C)   Resp 18   Ht 5' 7" (1.702 m)   Wt (!) 137 kg (302 lb 0.5 oz)   SpO2 99%   Breastfeeding No   BMI 47.30 kg/m²     24H Vital Sign Range:  Temp:  [97.4 °F (36.3 °C)-98.3 °F (36.8 °C)]   Pulse:  []   Resp:  [15-27]   BP: (120-141)/(79-97)   SpO2:  [98 %-100 %]     Level of Consciousness (AVPU): alert    Recent Labs     07/03/24  0809 07/04/24  0528 07/05/24  0441   WBC 10.33 9.54  9.54 10.90  10.90   HGB 10.6* 10.7*  10.7* 11.4*  11.4*   HCT 35.0* 34.8*  34.8* 38.3  38.3    284  284 344  344       Recent Labs     07/03/24  0727 07/04/24  0808 07/05/24  0441    137 137   K 5.0 4.6 4.7    106 105   CO2 18* 18* 17*   BUN 88* 91* 94*   CREATININE 7.0* 6.9* 7.0*   * 133* 119*   MG 2.5 2.4 2.5        Recent Labs     07/04/24  1746   PH 7.291*   PCO2 40.5   PO2 121*   HCO3 19.5*   POCSATURATED 98   BE -7*        OXYGEN:  Flow (L/min) (Oxygen Therapy): 2  Oxygen Concentration (%): 28       MEWS score: 3    Rounding completed w/Charge RN Olesya Youssef.  Discussed irregular tachycardia w/ HR in the 130s. Vital signs otherwise stable. Telemetry monitoring in place. EP following pt. No additional concerns verbalized at this time. Instructed to call [phone] for further concerns or assistance.    Berenice Gordillo RN       "

## 2024-07-05 NOTE — PROGRESS NOTES
"[facility] - Cardiology [facility] Medicine  Progress Note    Patient Name: Juhi Taylor  MRN: [id number]  Patient Class: IP- Inpatient   Admission Date: 7/3/2024  Length of Stay: 2 days  Attending Physician: Herb Coreas MD  Primary Care Provider: Tony Sadler MD        Subjective:     Principal Problem:Hypoxic respiratory failure        HPI:  Ms. Taylor is a 75yo female with a PMHx of CAD, ESRD on PD, HFrEF of 40%, and afib on eliquis who was transferred to [facility] from [facility] for a higher level of care. She originally presented to [facility] ED on 6/18 for worsening SOB and was found to be satting 89% on RA determined to be due to a heart failure exacerbation.  She was given lasix gtt and metolazone but still produced insufficient urine. HD was started, but during a dialysis session on 6/24 she suffered PEA cardiac arrest, epi was administered and she converted to V tach. After cardioversion ROSC was achieved and she was intubated and transferred to the ICU. Repeat Echo revealed an EF of 20-25% from 40% on admission but cardiology did not intervene at this time to due to her clinical condition but recommended later evaluation for ICD and angiogram. Her condition improved with further HD. She was extubated and moved to the floor but developed an uptrending leukocytosis possibly originating from a left trialysis catheter with bruising and possible purulent discharge. The line was removed. She was determined to not be a candidate for hospice. The family requested transfer to [facility] for a higher level of care. On admission she was comfortable but minimal further history could be obtained due to patient's frustration. See  note below:       "Ms. Juhi Taylor is a 74 y.o. female with afib on Eliquis, CAD, ESRD on PD, CHF EF40%, and morbid obesity who initailly presented to the [facility] ED on 6/18 for evaluation of shortness of breath.  She had a PD catheter inserted at the beginning of June to " start PD, but because of insurance issues, her PD got delayed.  Since that time, she developed worsening SOB and weight gain.  Because her symptoms continued to worsen, she went to the ER.     Upon arrival to the ER, she was found to be satting 89% on room air.  Labs showed WBC 10, Hgb 12.5, Creatinine 4.1 and BNP 1,726.  She was started on a Lasix gtt and Nephro was consulted.  She failed to have adequate urine output despite Lasix gtt and Metolazone, so she was started on HD.  On 6/24, she got PEA cardiac arrest during dialysis.  After giving Epinephrine, the rhythm appeared to change to ventricular tachycardia.  She was cardioverted and achieved ROSC.  She was intubated and transferred to ICU on an Amiodarone gtt, which was stopped because of patient intolerance.  She was found to have a significant Troponin and Cardiology was consulted.  Echo showed a drop in EF to 20-25% from 40% prior to admission.  Cardiology decided against acute intervention because of her clinical condition, but suggested an ICD and angiogram once improved.  HD was continued and and she was able to be extubated and SD to the floor.  She was noted to have an uptrending leukocytosis. Left trailysis cathter had bruising around and possible purulent collection and the line was removed. Blood culture and tip culture have been NGTD.  She is currently on Cefepime and Vancomycin.   She then developed AFib with RVR.  She was started on Sotalol by Cardiology, which patient then refused to take once reading the ADRs.  She was then started on a Dilt gtt (with her reduced EF) and Metoprolol.  She has been seen by Palliative Care, and they are not ready for hospice.  Patient and family are not happy with the care at [facility].  They are requesting transfer to [facility] for a HLOC.  She will transfer to [facility] for EP, Cardiology, Nephrology and IR evaluations.     Consult EP for afib with RVR/ICD eval  Consult Cardiology for angiogram  Consult Nephro for HD  Consult  "IR/Interventional Nephro for HD line placement"          Overview/Hospital Course:  Diltiazem gtt discontinued due to reduced EF and metoprolol continued. She was initiated on a heparin drip for thromboembolism prophylaxis. PD catheter originally placed 6/4 still present. Nephrology, cardiology, and electrophysiology consulted.     Nephrology opted to trial dialysis using the PD catheter to assess function and concern for catheter leakage before placing new trialysis line. Patient underwent successful trial of PD with net -800mL. Plan to continue PD nightly and assess volume status. IV lasix added after pt reporting mild/moderate shortness of breath at rest and remaining volume overloaded.    EP was going to take patient for MILLY DCCV on 7/5 however the patient refused and stated she did not want any further interventions because believed it would not help her situation. EP discussed with the patient and family that having rate control would improve the imaging of an ischemic work-up. Knowing this the patient continued to refuse. Her heparin drip was discontinued and Eliquis restarted. Palliative consulted, ongoing discussion with family.         Interval History: Patient with non labored breathing. Appears sad, does not want to talk to anyone. HR in the 100-130s. States she has too many complaints. Refusing all interventions at this time.    Objective:     Vital Signs (Most Recent):  Temp: 97.7 °F (36.5 °C) (07/05/24 1514)  Pulse: (!) 111 (07/05/24 1514)  Resp: 18 (07/05/24 1514)  BP: (!) 155/89 (07/05/24 1514)  SpO2: 97 % (07/05/24 1514) Vital Signs (24h Range):  Temp:  [97.4 °F (36.3 °C)-98.3 °F (36.8 °C)] 97.7 °F (36.5 °C)  Pulse:  [] 111  Resp:  [15-27] 18  SpO2:  [97 %-100 %] 97 %  BP: (120-155)/(79-97) 155/89     Weight: (!) 137 kg (302 lb 0.5 oz)  Body mass index is 47.3 kg/m².    Intake/Output Summary (Last 24 hours) at 7/5/2024 1540  Last data filed at 7/5/2024 0522  Gross per 24 hour   Intake 240 ml "   Output 534 ml   Net -294 ml         Physical Exam  HENT:      Head: Normocephalic.      Nose: Nose normal.   Eyes:      Pupils: Pupils are equal, round, and reactive to light.   Cardiovascular:      Rate and Rhythm: Tachycardia present. Rhythm irregular.   Pulmonary:      Breath sounds: Rales present.   Abdominal:      General: Abdomen is flat. There is no distension.      Palpations: Abdomen is soft.   Musculoskeletal:      Cervical back: Neck supple.      Right lower leg: Edema present.      Left lower leg: Edema present.   Neurological:      General: No focal deficit present.      Mental Status: She is alert.      Comments: A/Ox2              Significant Labs: All pertinent labs within the past 24 hours have been reviewed.    Significant Imaging: I have reviewed all pertinent imaging results/findings within the past 24 hours.    Assessment/Plan:      * Hypoxic respiratory failure  Patient with Hypoxic Respiratory failure which is Acute on chronic.  she is not on home oxygen. Supplemental oxygen was provided and noted- 99% 2LNCOxygen Concentration (%):  [28] 28    .   Signs/symptoms of respiratory failure include- increased work of breathing and lethargy. Contributing diagnoses includes - CHF, Obesity Hypoventilation, and ESRD  Labs and images were reviewed. Patient Has not had a recent ABG. Will treat underlying causes and adjust management of respiratory failure as follows-     - BiPAP nightly if agreeable  - 200mg IV lasix q12hr    Atrial fibrillation with RVR  Patient with Paroxysmal (<7 days) atrial fibrillation which is controlled currently with  diltiazem . Patient is currently in sinus rhythm.[id number] Score: 4. Pt. Has refused eliquis. Currently on heparin drip.    -cards & EP following, appreciate recs  -patient declined heparin drip and is a hard stick, will restart eliquis  -metoprolol for rate control  -Cardiology to consider cardioversion w/ MILLY, pacemaker once euvolemic and if patient is  "agreeable      Acute on chronic combined systolic and diastolic heart failure  Prior to admission to [facility], Echo 6/19/24 w/ EF 40-45% w/ venous pressure 15mmHg. Following PEA arrest repeat echo 6/24 EF 20-25% with C echo confirming reduced EF. At [facility] noted "Patient had significant elevation of troponin cardiology consulted. No acute intervention recommended because of clinical condition and later patient is not keen to do the angiogram was stress test."      -Cardiology consulted, want DCCV followed by ischemic work-up. Patient so far declining this intervention.  -Diuresis w/ 200mg IV lasix q12hr  -Metoprolol 50mg TID, plan to transition 200mg succinate daily            Severe sepsis  Presented with concerns of line infection. Cultures negative. Vitals stable.  - Discontinue cefepime    CKD stage 4 secondary to hypertension  Tunneled line removed due to possible infection.  BMP reviewed- noted Estimated Creatinine Clearance: 10.5 mL/min (A) (based on SCr of 7 mg/dL (H)). according to latest data. Based on current GFR, CKD stage is stage 5 - GFR < 15.  Monitor UOP and serial BMP and adjust therapy as needed. Renally dose meds. Avoid nephrotoxic medications and procedures.    -nightly PD per nephrology  -200mg IV lasix q12hr  -fluid restriction 1.5L  -strict I/Os      Severe obesity (BMI >= 40)  Body mass index is 47.3 kg/m². Morbid obesity complicates all aspects of disease management from diagnostic modalities to treatment. Weight loss encouraged and health benefits explained to patient.         Type 2 diabetes mellitus with microalbuminuria, without long-term current use of insulin  Last HgbA1c 6.8. Hold hypoglycemic medications while admitted.    Q4h glucose checks  Consider SSI as needed      Essential hypertension  Chronic, controlled. Latest blood pressure and vitals reviewed-     Temp:  [97.4 °F (36.3 °C)-98.3 °F (36.8 °C)]   Pulse:  []   Resp:  [15-27]   BP: (120-155)/(79-97)   SpO2:  [97 %-100 %] . "   Home meds for hypertension were reviewed and noted below.   Hypertension Medications               amLODIPine (NORVASC) 10 MG tablet Take 10 mg by mouth once daily.    carvediloL (COREG) 25 MG tablet Take 1 tablet (25 mg total) by mouth 2 (two) times daily.    cloNIDine (CATAPRES) 0.1 MG tablet Take 1 tablet (0.1 mg total) by mouth 3 (three) times daily as needed (PRN SBP > 165 mmHg).    cloNIDine 0.1 mg/24 hr td ptwk (CATAPRES) 0.1 mg/24 hr Place 1 patch onto the skin every 7 days.    furosemide (LASIX) 40 MG tablet Take 40 mg by mouth once daily.    nitroGLYCERIN (NITROSTAT) 0.4 MG SL tablet Place 1 tablet (0.4 mg total) under the tongue every 5 (five) minutes as needed for Chest pain.            While in the hospital, will manage blood pressure as follows; Adjust home antihypertensive regimen as follows- only metoprolol tartrate for now. Observe and resume home meds as needed.    Will utilize p.r.n. blood pressure medication only if patient's blood pressure greater than 180/110 and she develops symptoms such as worsening chest pain or shortness of breath.      VTE Risk Mitigation (From admission, onward)           Ordered     apixaban tablet 5 mg  2 times daily         07/05/24 1449     Place sequential compression device  Until discontinued         07/03/24 0450                    Discharge Planning   HARINI: 7/8/2024     Code Status: Full Code   Is the patient medically ready for discharge?:     Reason for patient still in hospital (select all that apply): Treatment                     Zakia Miranda DO  Department of Hospital Medicine   [facility]mato - Cardiology Stepdown

## 2024-07-05 NOTE — NURSING
Dr. Miranda is at bedside talking with patient and pt's sister (Amy). MD speaking with them at length and in detail about status and plan of care. Pt's sister on phone with niece April, who is a nurse. MD discussed with April the plan and status as well. Patient is NPO with anticipation for MILLY and cardioversion.

## 2024-07-05 NOTE — SUBJECTIVE & OBJECTIVE
Interval History: Seen this morning. Discussed peritoneal dialysis w family. They are concerned about her peripheral edema. She still feels a bit short of breath, although improved. Patient tolerating PD well.     Review of patient's allergies indicates:   Allergen Reactions    Percocet [oxycodone-acetaminophen] Itching    Amiodarone analogues      Itching      Irbesartan Swelling    Januvia [sitagliptin]     Jardiance [empagliflozin]      Leg cramps    Lipitor [atorvastatin] Other (See Comments)     Severe leg pain    Linaclotide Other (See Comments) and Nausea And Vomiting     Does not remember    Lubiprostone Other (See Comments) and Palpitations     Does not remember     Current Facility-Administered Medications   Medication Frequency    acetaminophen tablet 650 mg Q6H PRN    albuterol sulfate nebulizer solution 2.5 mg Q4H PRN    aspirin EC tablet 81 mg Daily    ceFEPIme (MAXIPIME) 1 g in D5W 100 mL IVPB (MB+) Q24H    dextromethorphan-guaiFENesin  mg/5 ml liquid 5 mL Q4H PRN    famotidine tablet 20 mg Daily    fluconazole tablet 200 mg Every other day    furosemide (Lasix) 200 mg in 0.9% NaCl 100 mL IVPB Q12H    gentamicin 0.1 % ointment TID    heparin 25,000 units in dextrose 5% (100 units/ml) IV bolus from bag LOW INTENSITY nomogram - [facility] PRN    heparin 25,000 units in dextrose 5% (100 units/ml) IV bolus from bag LOW INTENSITY nomogram - [facility] PRN    heparin 25,000 units in dextrose 5% 250 mL (100 units/mL) infusion LOW INTENSITY nomogram - [facility] Continuous    metoprolol tartrate (LOPRESSOR) tablet 50 mg TID    ondansetron injection 4 mg Q6H PRN    simethicone chewable tablet 80 mg TID PRN    vancomycin - pharmacy to dose pharmacy to manage frequency       Objective:     Vital Signs (Most Recent):  Temp: 97.8 °F (36.6 °C) (07/05/24 0808)  Pulse: (!) 131 (07/05/24 0808)  Resp: 18 (07/05/24 0808)  BP: (!) 129/96 (07/05/24 0808)  SpO2: 99 % (07/05/24 0808) Vital Signs (24h Range):  Temp:  [97.4 °F (36.3  °C)-98.3 °F (36.8 °C)] 97.8 °F (36.6 °C)  Pulse:  [] 131  Resp:  [15-27] 18  SpO2:  [98 %-100 %] 99 %  BP: (120-141)/(79-97) 129/96     Weight: (!) 137 kg (302 lb 0.5 oz) (07/03/24 1422)  Body mass index is 47.3 kg/m².  Body surface area is 2.54 meters squared.    I/O last 3 completed shifts:  In: 360 [P.O.:360]  Out: 1816 [Urine:300; Other:1516]     Physical Exam  Constitutional:       General: She is not in acute distress.     Appearance: Normal appearance. She is obese.   HENT:      Head: Normocephalic and atraumatic.      Mouth/Throat:      Mouth: Mucous membranes are moist.      Pharynx: Oropharynx is clear.   Cardiovascular:      Rate and Rhythm: Normal rate and regular rhythm.      Heart sounds: Normal heart sounds.   Pulmonary:      Effort: Pulmonary effort is normal.      Breath sounds: Normal breath sounds.   Abdominal:      Palpations: Abdomen is soft.   Musculoskeletal:      Right lower leg: Edema (2+) present.      Left lower leg: Edema (2+) present.   Skin:     General: Skin is warm and dry.   Neurological:      General: No focal deficit present.      Mental Status: She is alert and oriented to person, place, and time.          Significant Labs:  All labs within the past 24 hours have been reviewed.     Significant Imaging:  All imaging with the past 24 hours have been reviewed.

## 2024-07-06 NOTE — ASSESSMENT & PLAN NOTE
Tunneled line removed due to possible infection.  BMP reviewed- noted Estimated Creatinine Clearance: 10.7 mL/min (A) (based on SCr of 6.9 mg/dL (H)). according to latest data. Based on current GFR, CKD stage is stage 5 - GFR < 15.  Monitor UOP and serial BMP and adjust therapy as needed. Renally dose meds. Avoid nephrotoxic medications and procedures.    Additional interventions pending [facility] discussion with palliative care, pt, and family.     -nightly PD per nephrology, new HD line pending   -200mg IV lasix q12hr  -fluid restriction 1.5L  -strict I/Os

## 2024-07-06 NOTE — NURSING
Plan of care reviewed with patient and patient's children. Patient refused to answer orientation questions throughout the day. Patient refused all meds and care throughout the day, team aware. Tele camera in place and bed alarm set. Patient remained free of falls and trauma, fall precautions are in place. Wheels are locked and the bed is in lowest position. The call bell is within reach. Telemetry is on.

## 2024-07-06 NOTE — CONSULTS
" Palliative Medicine  Consult Note       Patient Name: Juhi Taylor   MRN: [id number]   Admission Date: 7/3/2024   Hospital Length of Stay: 3   Attending Provider: Herb Coreas MD   Consulting Provider: Josephine Concepcion MD  Primary Care Physician: Tony Sadler MD   Principal Problem: Hypoxic respiratory failure     Patient information was obtained from relative(s), past medical records, and ER records.        Consults       Assessment/Plan:    CHF with complicated cardiac history.  Patient does not want further intervention and wants to go home. Youngest daughter wants to continue treatments.  Will need sequential treatments in efforts to be "better" but no one is convinced that she has the functional status to get "better".  ESRD does not want HD catheter placed. Will ask Nephro if PD can be restarted.  Palliative Care Encounter:  Impression:  Elderly woman with multiple medical problems including end-stage renal disease, worsening heart failure as well as significant dysrhythmias.  Patient says that she wants to go home however it is unclear if she understands that going home means that she will have a limited life span.  In her current date I am not sure she has a candidate for either peritoneal or hemodialysis.    Palliative care consulted for goals of care and decision making.       Advance Care Planning   Advance Care Planning     - Prior experience with serious illness: yes  -The patient has previously engaged in advance care planning or GOC discussions  - Insight/Understanding of illness: difficult decision making between family members.    Patient does not have a healthcare power of  but she has 3 of 4 living children.  Son Dawood and daughter Maria Luisa are in agreement however daughter francy is not willing to participate in the conversation.  But no decisions have been made    Life Limiting Diagnosis:  ESHD and ESRD  -Prognosis-Time and potential for recovery: poor  -Functional " status: poor.  Pt was not able to manage ADLs  -Dementia diagnosis no      Symptom Management:  -Pain: no      -Dyspnea no      -Anxiety/Depression no      -Constipation: no      -Anorexia:yes      Summary of recommendations and follow up plan:  -Most important goals at this time: Further discussion about care plans and what might be beneficial   -Code status: Full Code but did make a recommendation that her status change to allow natural death.  -Disposition: continue current level of care.      The above recommendations communicated directly to primary team on 07/06/2024    Thank you for your consult. I will follow-up with patient. Please contact us if you have any additional questions.       Subjective:     Chief Complaint: No chief complaint on file.        HPI:   Ms. Taylor is a 73yo female with a PMHx of CAD, ESRD on PD, HFrEF of 40%, and afib on eliquis who was transferred to [facility] from [facility] for a higher level of care. She originally presented to [facility] ED on 6/18 for worsening SOB and was found to be satting 89% on RA determined to be due to a heart failure exacerbation. She was given lasix gtt and metolazone but still produced insufficient urine. HD was started, but during a dialysis session on 6/24 she suffered PEA cardiac arrest, epi was administered and she converted to V tach. After cardioversion ROSC was achieved and she was intubated and transferred to the ICU. Repeat Echo revealed an EF of 20-25% from 40% on admission but cardiology did not intervene at this time to due to her clinical condition but recommended later evaluation for ICD and angiogram. Her condition improved with further HD. She was extubated and moved to the floor but developed an uptrending leukocytosis possibly originating from a left trialysis catheter with bruising and possible purulent discharge. The line was removed. She was determined to not be a candidate for hospice. The family requested transfer to [facility] for a higher level of  care.       Hospital Course:  Since arrival to [facility], patient has refused interventions.  This morning in fact she refused taking her medications.  I was asked to see her to help delineate goals of care and further treatments.  Her son and oldest daughter Maria Luisa feel that the patient is nearing the end of her life.  Other daughter francy does not want to hear anything about this.  She also does not think that her mother is capable of making any decisions.  Patient was pretty clear about going home and yesterday apparently understood the ramifications of stopping all of her current treatments.    Review of Symptoms      Symptom Assessment (ESAS 0-10 Scale)  Unable to complete assessment due to Mental status change     CAM / Delirium:  Negative  Constipation:  Negative  Diarrhea:  Negative      Bowel Management Plan (BMP):  Yes      Pain Assessment:  OME in 24 hours:  0  Location(s):      Pain Assessment in Advanced Demential Scale (PAINAD)   Breathing - Independent of vocalization:  0  Negative vocalization:  0  Facial expression:  0  Body language:  0  Consolability:  0  Total:  0    Modified Chago Scale:  0    Performance Status:  40    Living Arrangements:  Lives with family, Lives in home and Lives >50 miles from facility    Psychosocial/Cultural:   See Palliative Psychosocial Note: No  , 3 living children.    of pancreatic cancer with hospice. Son  with hospice as well.  Daughter Francy lives with patient, daughter Criss and son Dawood /wife supportive. They do not want mom to suffer.  **Primary  to Follow**  Palliative Care  Consult: Yes    Spiritual:  F - Fely and Belief:  Non Adventism  I - Importance:  Yes  C - Community:  Unknown  A - Address in Care:  Unknown           ROS:  Review of Systems   Constitutional:  Positive for activity change, appetite change and fatigue.   Respiratory:  Positive for shortness of breath.    Cardiovascular:  Negative for chest  "pain.   Neurological:  Positive for weakness.         Past Medical History:   Diagnosis Date    Anticoagulant long-term use     Arthritis     Breast cancer 2014    invasive lobular carcinoma    Cancer of kidney 11/2020    RIGHT KIDNEY CANCER    CHF (congestive heart failure)     Coronary artery disease dx 2005    Depression     Diabetes mellitus     Diastolic heart failure secondary to hypertension     Gout     Hyperlipemia     Hypertension     Hypertrophy of nasal turbinates     Kidney mass 2020    Right    Levoscoliosis     Lung nodule     left    Multiple thyroid nodules     NICOLE (obstructive sleep apnea)     uses C-PAP    Pulmonary hypertension      Past Surgical History:   Procedure Laterality Date    AORTOGRAPHY N/A 12/04/2020    Procedure: Aortogram;  Surgeon: Paul Pedersen MD;  Location: [facility] CATH;  Service: Cardiology;  Laterality: N/A;    BREAST SURGERY      CARDIAC CATHETERIZATION  12/2020    CHOLECYSTECTOMY      COLONOSCOPY      multi -last 2014     CORONARY ARTERY BYPASS GRAFT      ESOPHAGOGASTRODUODENOSCOPY      2012     HAND SURGERY Right     INSERTION OF CATHETER N/A 6/23/2024    Procedure: Insertion,catheter;  Surgeon: Meli Griffin MD;  Location: [facility] OR;  Service: Vascular;  Laterality: N/A;  ORIGINAL CATHETER WAS REPOSITIONED.  NO NEW CATHETER WAS PLACED    INSERTION, CATHETER, DIALYSIS, PERITONEAL N/A 6/4/2024    Procedure: IN Insertion Catheter, Dialysis, Peritoneal - laparoscopic;  Surgeon: Rodriguez Catalan Jr., MD;  Location: [facility] OR;  Service: General;  Laterality: N/A;    LAPAROSCOPIC ROBOT-ASSISTED SURGICAL REMOVAL OF KIDNEY USING DA CHELLE XI Right 03/10/2022    Procedure: XI ROBOTIC NEPHRECTOMY- radical;  Surgeon: Rolando Ramirez MD;  Location: [facility] OR;  Service: Urology;  Laterality: Right;    MASTECTOMY W/ SENTINEL NODE BIOPSY Bilateral 01/21/2015    bilateral "dog ears"    NASAL SINUS SURGERY  2015    Dr Bryant FESS/cauterization turbinate     PARTIAL HYSTERECTOMY  1989 "    PERCUTANEOUS TRANSLUMINAL BALLOON ANGIOPLASTY OF CORONARY ARTERY  12/04/2020    Procedure: Angioplasty-coronary;  Surgeon: Paul Pedersen MD;  Location: [facility] CATH;  Service: Cardiology;;    RENAL BIOPSY Right     9/20/2021 EJ    TUBAL LIGATION      ULTRASOUND GUIDANCE  12/04/2020    Procedure: Ultrasound Guidance;  Surgeon: Paul Pedersen MD;  Location: [facility] CATH;  Service: Cardiology;;     Family History   Problem Relation Name Age of Onset    Breast cancer Mother      Stroke Father Waqar Sr.     Hypertension Father Waqar Sr.     Hepatitis Brother      Asthma Daughter Nell Taylor     Birth defects Daughter Nell Camejo's two children has cleft lips    Depression Daughter Nell Taylor     Drug abuse Daughter Nell Taylor     Learning disabilities Daughter Nell Taylor     Mental illness Daughter Nell Taylor     Breast cancer Maternal Aunt      Glaucoma Sister      Drug abuse Daughter Nell     Macular degeneration Neg Hx      Retinal detachment Neg Hx           Review of patient's allergies indicates:   Allergen Reactions    Percocet [oxycodone-acetaminophen] Itching    Amiodarone analogues      Itching      Irbesartan Swelling    Januvia [sitagliptin]     Jardiance [empagliflozin]      Leg cramps    Lipitor [atorvastatin] Other (See Comments)     Severe leg pain    Linaclotide Other (See Comments) and Nausea And Vomiting     Does not remember    Lubiprostone Other (See Comments) and Palpitations     Does not remember       Medications:    Current Facility-Administered Medications:     acetaminophen tablet 650 mg, 650 mg, Oral, Q6H PRN, Zakia Miranda DO, 650 mg at 07/05/24 1550    albuterol sulfate nebulizer solution 2.5 mg, 2.5 mg, Nebulization, Q4H PRN, Zakia Miranda DO    apixaban tablet 5 mg, 5 mg, Oral, BID, Zakia Miranda DO, 5 mg at 07/05/24 2044    aspirin EC tablet 81 mg, 81 mg, Oral, Daily, Zakia Miranda DO, 81 mg at 07/05/24 1029    dextromethorphan-guaiFENesin   mg/5 ml liquid 5 mL, 5 mL, Oral, Q4H PRN, Zakia Miranda DO, 5 mL at 07/03/24 1302    famotidine tablet 20 mg, 20 mg, Oral, Daily, Rush Winkler MD, 20 mg at 07/05/24 0856    furosemide (Lasix) 200 mg in 0.9% NaCl 100 mL IVPB, 200 mg, Intravenous, Q12H, Zakia Miranda DO, Stopped at 07/05/24 2204    gentamicin 0.1 % ointment, , Topical (Top), TID, Jon Hernandez MD, Given at 07/05/24 2045    metoprolol tartrate tablet 75 mg, 75 mg, Oral, TID, Herb Coreas MD    ondansetron injection 4 mg, 4 mg, Intravenous, Q6H PRN, Zakia Miranda DO, 4 mg at 07/05/24 0303    simethicone chewable tablet 80 mg, 1 tablet, Oral, TID PRN, Zakia Miranda DO, 80 mg at 07/04/24 1818         Objective:      Physical Exam:  Vitals: Temp: 96.4 °F (35.8 °C) (07/06/24 1520)  Pulse: (!) 137 (07/06/24 1641)  Resp: (!) 22 (07/06/24 1520)  BP: (!) 149/112 (07/06/24 1520)  SpO2: 96 % (07/06/24 1520)    Physical Exam  Constitutional:       Appearance: She is ill-appearing.   Cardiovascular:      Rate and Rhythm: Tachycardia present.   Pulmonary:      Breath sounds: Rhonchi present.   Neurological:      General: No focal deficit present.                 Labs:   Creatinine   Date Value Ref Range Status   07/06/2024 6.9 (H) 0.5 - 1.4 mg/dL Final     POC Creatinine   Date Value Ref Range Status   03/12/2021 1.1 0.5 - 1.4 mg/dL Final      Hemoglobin   Date Value Ref Range Status   07/06/2024 11.5 (L) 12.0 - 16.0 g/dL Final      Albumin   Date Value Ref Range Status   07/06/2024 2.9 (L) 3.5 - 5.2 g/dL Final   07/05/2024 2.7 (L) 3.5 - 5.2 g/dL Final   07/04/2024 2.6 (L) 3.5 - 5.2 g/dL Final          Imaging: reviewed        I spent a total of 126 minutes on the day of the visit. This includes face to face time in discussion of goals of care, symptom assessment, coordination of care and emotional support.  This also includes non-face to face time preparing to see the patient (eg, review of tests/imaging), obtaining and/or reviewing  separately obtained history, documenting clinical information in the electronic or other health record, independently interpreting results and communicating results to the patient/family/caregiver, or care coordinator.     Additional >46 min time spent on a voluntary advance care planning and /or goals of care discussion, providing emotional support, formulating, and communicating prognosis and exploring burden/benefit of various approaches of treatment. Family meeting held discussing possible treatments and outcomes of each intervention. No decision maker and children are not in agreement with mom's desire to go home. Will discuss further tomorrow.      Thank you for the opportunity to care for this patient and family.       Josephine Concepcion MD

## 2024-07-06 NOTE — ASSESSMENT & PLAN NOTE
"Prior to admission to [facility], Echo 6/19/24 w/ EF 40-45% w/ venous pressure 15mmHg. Following PEA arrest repeat echo 6/24 EF 20-25% with [facility] echo confirming reduced EF. At [facility] noted "Patient had significant elevation of troponin cardiology consulted. No acute intervention recommended because of clinical condition and later patient is not keen to do the angiogram was stress test."  Cardiology had planned for DCCV w/ MILLY but patient refused intervention.     -Cardiology consulted, want DCCV followed by ischemic work-up. Patient so far declining this intervention.  -Diuresis w/ 200mg IV lasix q12hr  -Metoprolol 75mg TID, plan to transition 200mg succinate daily          "

## 2024-07-06 NOTE — PT/OT/SLP PROGRESS
"Physical Therapy Treatment  Co-treatment with OT due to acuity of condition, level of skilled assist needed for assessment of safety with mobility and potential of not tolerating a second treatment session.     Patient Name:  Juhi Taylor   MRN:  [id number]    Recommendations:     Discharge Recommendations: Moderate Intensity Therapy  Discharge Equipment Recommendations: none  Barriers to discharge:  current level of assistance required     Assessment:     Juhi Taylor is a 74 y.o. female admitted with a medical diagnosis of Hypoxic respiratory failure.  She presents with the following impairments/functional limitations: weakness, impaired endurance, impaired cognition, impaired self care skills, impaired functional mobility, pain, decreased safety awareness, decreased lower extremity function, decreased upper extremity function, impaired cardiopulmonary response to activity Pt tolerated treatment session fairly well today. Pt is still requiring moderate to maximal assistance for bed mobility. Pt sat EOB for most of the session requiring little to no assistance for EOB sitting balance. Pt unwilling to attempt standing transfers despite maximal encouragement from family, writing PTA and present OT. Pt refusing all ADL's and therapeutic exercises during today's session. Patient remains appropriate for continued skilled services within the acute environment and goals remain appropriate.   .    Rehab Prognosis: Good; patient would benefit from acute skilled PT services to address these deficits and reach maximum level of function.    Recent Surgery: * No surgery found *      Plan:     During this hospitalization, patient to be seen 4 x/week to address the identified rehab impairments via gait training, therapeutic activities and progress toward the following goals:    Plan of Care Expires:  08/02/24    Subjective     Chief Complaint: Pt reporting "whole body" pain   Patient/Family Comments/goals: "I want " "to go home."   Pain/Comfort:  Pain Rating 1:  (not rated)  Location - Orientation 1: generalized ("whole body")  Pain Addressed 1: Reposition, Distraction, Cessation of Activity  Pain Rating Post-Intervention 1:  (not rated)      Objective:     Communicated with Rn prior to session.  Patient found supine with telemetry, oxygen, peripheral IV upon PT entry to room.     General Precautions: Standard, fall  Orthopedic Precautions:    Braces:    Respiratory Status: Nasal cannula  Functional Mobility:  Bed Mobility:     Scooting anteriority: moderate assistance  Supine to Sit: moderate assistance  Pt sat EOB ~ 20 minutes requiring CGA/SBA   Sit to Supine: maximal assistance      AM-PAC 6 CLICK MOBILITY  Turning over in bed (including adjusting bedclothes, sheets and blankets)?: 2  Sitting down on and standing up from a chair with arms (e.g., wheelchair, bedside commode, etc.): 2  Moving from lying on back to sitting on the side of the bed?: 2  Moving to and from a bed to a chair (including a wheelchair)?: 2  Need to walk in hospital room?: 1  Climbing 3-5 steps with a railing?: 1  Basic Mobility Total Score: 10       Treatment & Education:  Therapist provided instruction and educated for safety during bed mobility, and EOB balancing. As well as proper body mechanics, energy conservation, and fall prevention strategies during tasks listed above, and the effects of prolonged immobility and the importance of performing EOB/OOB activity and exercises to promote healing and reduce recovery time.       Patient left supine with all lines intact, call button in reach, Rn notified, and family present..    GOALS:   Multidisciplinary Problems       Physical Therapy Goals          Problem: Physical Therapy    Goal Priority Disciplines Outcome Goal Variances Interventions   Physical Therapy Goal     PT, PT/OT Progressing     Description: Goals to be met by: 8/2/24     Patient will increase functional independence with mobility by " performin. Supine to sit with Contact Guard Assistance  2. Sit to stand transfer with Contact Guard Assistance with RW  3. Bed to chair transfer with Contact Guard Assistance using Rolling Walker  4. Gait  x 50 feet with Contact Guard Assistance using Rolling Walker.                          Time Tracking:     PT Received On: 24  PT Start Time: 1101     PT Stop Time: 1128  PT Total Time (min): 27 min     Billable Minutes: Therapeutic Activity 20    Treatment Type: Treatment  PT/PTA: PTA     Number of PTA visits since last PT visit: 2024

## 2024-07-06 NOTE — PROGRESS NOTES
07/05/24 2105        Peritoneal Dialysis Catheter 06/04/24 Other Left lower abdomen   Placement Date: 06/04/24   Present Prior to Hospital Arrival?: No  Inserted by: MD  Catheter Type: (c) Other  Catheter Location: Left lower abdomen   Site Assessment Clean;Dry;Intact;No swelling   Dressing Intervention Sterile dressing change   Status Accessed   Dressing Status Clean;Dry;Intact   Dressing Gauze   Securement secured to abdomen with tape   Clamp Status unclamped   Peritoneal Dialysis   Exchange Type Cycler   Peritoneal Treatment Status Started   Dianeal Solution Dextrose 2.5% in 6000 mL   Cycler Peritoneal Dialysis   Initial Drain Volume (mL) 3 mL   Effluent Appearance Yellow   Cycler Treatment Comments CCPD started     CCPD tx started per MD orders.  Lines connected aseptically. Sterile dressing change done on PD catheter site.Primary nurse aware of initiation of PD

## 2024-07-06 NOTE — ASSESSMENT & PLAN NOTE
Patient with Hypoxic Respiratory failure which is Acute on chronic.  she is not on home oxygen. Supplemental oxygen was provided and noted- 99% 2LNCOxygen Concentration (%):  [28] 28    .   Signs/symptoms of respiratory failure include- increased work of breathing and lethargy. Contributing diagnoses includes - CHF, Obesity Hypoventilation, and ESRD  Labs and images were reviewed. Patient Has not had a recent ABG. Will treat underlying causes and adjust management of respiratory failure as follows-     Respiratory failure likely multifactorial that includes ESRD s/p RCC w/ nephrectomy, HFrEF with significant reduction in EF following PEA arrest at OSH, obesity hypoventilation, and concern for possible uremia 2/2 inadequate PD clearance.     -Further invention pending family meeting with Palliative care    - BiPAP nightly if agreeable  - 200mg IV lasix q12hr  - New HD line if consensus reached by patient/family

## 2024-07-06 NOTE — SUBJECTIVE & OBJECTIVE
Interval History: Early this AM pt reported feeling somewhat improved but is still feeling SOB at rest. Tolerating dialysis at night. Endorses understanding of d/c of heparin and starting on eliquis. Per RN message and note, later in AM pt is refusing all medications and is not talking to health staff. Palliative medicine assisting in facilitating family meeting regarding GOC.       Objective:     Vital Signs (Most Recent):  Temp: 97.9 °F (36.6 °C) (07/06/24 1210)  Pulse: (!) 124 (07/06/24 1450)  Resp: (!) 21 (07/06/24 1210)  BP: 121/72 (07/06/24 1210)  SpO2: (!) 93 % (07/06/24 1210) Vital Signs (24h Range):  Temp:  [96.3 °F (35.7 °C)-98 °F (36.7 °C)] 97.9 °F (36.6 °C)  Pulse:  [] 124  Resp:  [18-21] 21  SpO2:  [93 %-100 %] 93 %  BP: (120-167)/() 121/72     Weight: (!) 137 kg (302 lb 0.5 oz)  Body mass index is 47.3 kg/m².    Intake/Output Summary (Last 24 hours) at 7/6/2024 1453  Last data filed at 7/6/2024 0916  Gross per 24 hour   Intake --   Output 470 ml   Net -470 ml         Physical Exam  Vitals and nursing note reviewed.   Constitutional:       General: She is not in acute distress.     Appearance: She is obese. She is ill-appearing.   Cardiovascular:      Rate and Rhythm: Tachycardia present. Rhythm irregular.      Heart sounds: No murmur heard.  Pulmonary:      Effort: No respiratory distress.      Breath sounds: Examination of the right-middle field reveals rales. Examination of the left-middle field reveals rales. Examination of the right-lower field reveals rales. Examination of the left-lower field reveals rales. Decreased breath sounds and rales present.   Abdominal:      Palpations: Abdomen is soft.   Musculoskeletal:      Right lower leg: Edema present.      Left lower leg: Edema present.   Skin:     General: Skin is warm and dry.   Neurological:      General: No focal deficit present.      Mental Status: She is alert.             Significant Labs: All pertinent labs within the past 24  hours have been reviewed.  CBC:   Recent Labs   Lab 07/05/24  0441 07/06/24  0721   WBC 10.90  10.90 8.72   HGB 11.4*  11.4* 11.5*   HCT 38.3  38.3 39.4     344 343     CMP:   Recent Labs   Lab 07/05/24  0441 07/06/24  0721    138   K 4.7 4.4    105   CO2 17* 16*   * 167*   BUN 94* 92*   CREATININE 7.0* 6.9*   CALCIUM 9.5 9.6   PROT 6.5 6.6   ALBUMIN 2.7* 2.9*   BILITOT 0.9 0.9   ALKPHOS 102 99   AST 15 16   ALT 13 15   ANIONGAP 15 17*       Significant Imaging: I have reviewed all pertinent imaging results/findings within the past 24 hours.

## 2024-07-06 NOTE — CONSULTS
Thanks for consult. Carmen notes from PM team at [facility] reviewed.  Plan to see later today.  Josephine Concepcion MD  Palliative Medicine

## 2024-07-06 NOTE — PT/OT/SLP PROGRESS
Occupational Therapy   Co-Treatment    Name: Juhi Taylor  MRN: [id number]  Admitting Diagnosis:  Hypoxic respiratory failure       Recommendations:     Discharge Recommendations: Moderate Intensity Therapy  Discharge Equipment Recommendations:  bedside commode, bath bench, wheelchair, grab bar  Barriers to discharge:  Other (Comment) (increased skilled assist required)    Assessment:     Juhi Taylor is a 74 y.o. female with a medical diagnosis of Hypoxic respiratory failure.  She presents with the following performance deficits affecting function: weakness, impaired endurance, impaired self care skills, impaired functional mobility, decreased safety awareness, pain, impaired cardiopulmonary response to activity. Pt family present and providing increased encouragement and motivation for pt to participate with therapy team - pt displaying frustration secondary to decreased levels of autonomy and independence. Pt limited in optimal participation in session secondary to fatigue, self-limiting behaviors, and decreased activity tolerance. Pt would continue to benefit from skilled OT services in the acute care setting to promote increased participation in ADL routines, increase functional strength and endurance needed for functional transfers, and to facilitate a return to PLOF and least restrictive home environment.     Rehab Prognosis:  Fair; patient would benefit from acute skilled OT services to address these deficits and reach maximum level of function.       Plan:     Patient to be seen 4 x/week to address the above listed problems via self-care/home management, therapeutic activities, therapeutic exercises, neuromuscular re-education  Plan of Care Expires: 08/04/24  Plan of Care Reviewed with: patient, daughter    Subjective     Chief Complaint: fatigue  Patient/Family Comments/goals: Pt stating she didn't want therapy team to take away her rights.   Pain/Comfort:  Pain Rating 1: other (see comments)  (pt stating she has pain all over but did not rate)  Pain Addressed 1: Reposition, Distraction, Cessation of Activity    Objective:     Communicated with: Nursing prior to session.  Patient found HOB elevated with telemetry, oxygen, peripheral IV upon OT entry to room.    General Precautions: Standard, fall, diabetic    Orthopedic Precautions:N/A  Braces: N/A  Respiratory Status: Nasal cannula, flow 2 L/min     Occupational Performance:     Bed Mobility:    Patient completed Scooting/Bridging with maximal assistance  Patient completed Supine to Sit with moderate assistance  Patient completed Sit to Supine with maximal assistance     Functional Mobility/Transfers:  Pt able to tolerate sitting EOB ~20 minutes with CGA progressing to SBA at times. Pt declining attempts for standing this session.     Activities of Daily Living:  Pt declined participation in ADLs this session  Pt had small bowel movement in bed but unwilling to stand or roll for hygiene - exchanged soiled marko pads for fresh ones as pt in sitting and supine      [facility] 6 Click ADL: 16    Treatment & Education:  Provided education on the role of OT, POC, and therapy goals while in the acute care setting. Provided education on the importance of continued mobilization and participation in OOB activities to increase functional endurance and activity tolerance for increased participation in ADL routines. Provided education regarding DC from acute care setting and importance of participating with therapy staff in order to return home. Utilized therapeutic use of self, active listening techniques, and motivational interviewing throughout session. All questions within the scope of OT answered.    Co-treatment performed due to patient's multiple deficits requiring two skilled therapists to appropriately and safely assess patient's strength, endurance, functional mobility, and ADL performance while facilitating functional tasks in addition to accommodating for  patient's activity tolerance and medical acuity.    Patient left HOB elevated with all lines intact, call button in reach, and family present    GOALS:   Multidisciplinary Problems       Occupational Therapy Goals          Problem: Occupational Therapy    Goal Priority Disciplines Outcome Interventions   Occupational Therapy Goal     OT, PT/OT     Description: Goals to be met by: 08/04/24     Patient will increase functional independence with ADLs by performing:    UE Dressing with Modified Story.  LE Dressing with Minimal Assistance.  Grooming while standing at sink with Stand-by Assistance.  Toileting from bedside commode with Minimal Assistance for hygiene and clothing management.   Toilet transfer to bedside commode with Supervision.    DME:  Tub transfer bench is required for pt to return to t/s use with shower chair at present unsuitable with need for mobiltiy greater than pt can safely complete at present.     Patient has a mobility limitation that significantly impairs their ability to participate in one or more mobility related activities of daily living, including toileting. This deficit can be resolved by using a bedside commode. Patient demonstrates mobility limitations that will cause them to be confined to one room at home without bathroom access for up to 30 days. Using a bedside commode will greatly improve the patient's ability to participate in MRADLs.     Ambulation needs TBD on progress.                              Time Tracking:     OT Date of Treatment: 07/06/24  OT Start Time: 1101  OT Stop Time: 1128  OT Total Time (min): 27 min    Billable Minutes:Neuromuscular Re-education 20 minutes    OT/DAYSI: OT          7/6/2024

## 2024-07-06 NOTE — SIGNIFICANT EVENT
"Attended family meeting together with Dr. Concepcion ([facility]). Pt. Two daughters Nell and Maria Luisa as well as pt. Son were present.   Pt. Is is conversational but altered today. She expresses wishes to stop all treatments and to let her die. She has been refusing treatments for the last few days and has consistently repeated her wishes to refuse medical treatments.      Grave disagreement between family members is apparent. The pt. Has not left any advance directives or appointed a power of , so in case of lack of capacity, the children will have joint surrogate decision making.  is .     Attended lengthy family discussion led by Dr. Concepcion.   Discussed surrogacy, discussed evaluation of decision making capacity and outlined treatment options and their pro and cons.     The pt. Son and older daughter (Maria Luisa) express that they value their mother's expressed wishes to forgo further aggressive life prolonging treatments and agree to consider shifting care towards comfort focused care, poss. Hospice and end of life care.   Pt. Youngest daughter (Nell) has expressed that she feels that initiating Hemodialysis will improve the pt. Mentation and overall health and is in grave disagreement with the pt. Expressed wish to forgo further life prolonging treatments as she is "not in her right mind" and her decisions are clouded by kidney toxins.     Joint meeting was terminated early as the youngest daughter Nell left the room in disagreement with her siblings.    We discussed poss. Pathways of care going forward with the remaining two siblings. Outlined different avenues of hospice care including hospice at home, hospice at a nursing home or an inpatient hospice facilities respecting the qualifications and need for different levels of family involvement.     The elder siblings are interested in discussing home hospice further.     Left meeting with the agreement that further discussions will need to be " had. Also will re-assess the pt. Mental capacity to make informed treatment decisions over the next 1-2 days.     DNR briefly discussed but no consensus has been reached today.   Medically, CPR and escalation of care, artificial life support and mechanical ventilation is not felt to reflect beneficial care for Ms. Taylor in this situation. DNR is supported.     Herb Coreas MD  Sr. Staff Physician  [facility]

## 2024-07-06 NOTE — PROGRESS NOTES
Progress Note  Nephrology          SUBJECTIVE:     No overnight events, s/p PD last night with only 70 ml UF, as per nurse th ept is refusing everything . Plan for palliative meeting today to discuss the [facility]     Review of Systems   She is not talking       OBJECTIVE:     Medications:   apixaban  5 mg Oral BID    aspirin  81 mg Oral Daily    famotidine  20 mg Oral Daily    furosemide (LASIX) injection  200 mg Intravenous Q12H    gentamicin   Topical (Top) TID    metoprolol tartrate  75 mg Oral TID         Vitals:    07/06/24 1210   BP: 121/72   Pulse: 78   Resp: (!) 21   Temp: 97.9 °F (36.6 °C)     I/O last 3 completed shifts:  In: 120 [P.O.:120]  Out: 934 [Urine:700; Other:234]    Physical Exam:  General: ill-appearing   HENT: Normal mouth nose and ears.  Neck: no JVD and thyroid not enlarged, symmetric, no tenderness/mass/nodules  Lungs: b/l crackles   Cardiovascular: regular rate and rhythm, S1, S2 normal, no murmur, click, rub or gallop.   Abdomen: soft, non-distented; bowel sounds normal  Skin: No rashes or lesions  Musculoskeletal:edema, no deformities.       Dialysis Access: PD cath         Laboratory:  Recent Labs   Lab 07/04/24  0528 07/05/24  0441 07/06/24  0721   WBC 9.54  9.54 10.90  10.90 8.72   HGB 10.7*  10.7* 11.4*  11.4* 11.5*   HCT 34.8*  34.8* 38.3  38.3 39.4     284 344  344 343   MONO 10.2  10.2  1.0  1.0 10.6  10.6  1.2*  1.2* 10.2  0.9   EOSINOPHIL 1.4  1.4 1.3  1.3 0.6       Recent Labs   Lab 07/04/24  0808 07/05/24  0441 07/06/24  0721    137 138   K 4.6 4.7 4.4    105 105   CO2 18* 17* 16*   BUN 91* 94* 92*   CREATININE 6.9* 7.0* 6.9*   CALCIUM 9.2 9.5 9.6   PHOS  --   --  6.6*           ASSESSMENT/PLAN:     ESRD ON PD   74-year-old female with DMT2, ESRD on HD, pAF, CAD s/p PCI and HFrEF is admitted as a transfer for acute HFrEF in setting of CKD5 and recent PEA arrest during dialysis. Recent PD catheter placement on 6/4 but concerned for a possibly leak  from cath. Unable to initiate dialysis for 2 weeks PTA to OSH due to insurance issues. Hospital course complicated by infected tunneled cath removed on 6/30. Transferred here for higher level of care. Initial plan at OSH was to replace tunneled catheter on the left side due to concerns to that right side may not by suitable. Peritoneal fluid studies negative for peritonitis.      Plan:  - pt is clearing with PD and is volume overloaded despite the high dose lasix   - plan for meeting for palliative today . If the decision is to continue the the dialysis then we need to do HD   - Fluconazole per ISPD guidelines as prophylaxis  - Trend RFP; replete electrolytes PRN for goal K > 4, Phos > 3, Mg > 2  - Strict I&Os  - Avoid nephrotoxic agents  - Renally adjust medications      Thank you for your consult. I will follow-up with patient. Please contact us if you have any additional questions.      Carlin Stweart MD  Nephrology Attending

## 2024-07-06 NOTE — CARE UPDATE
"RAPID RESPONSE NURSE CHART REVIEW        Chart Reviewed: 07/06/2024, 11:32 AM    MRN: [id number]  Bed: 330/330 A    Dx: Hypoxic respiratory failure    Juhi Taylor has a past medical history of Anticoagulant long-term use, Arthritis, Breast cancer, Cancer of kidney, CHF (congestive heart failure), Coronary artery disease, Depression, Diabetes mellitus, Diastolic heart failure secondary to hypertension, Gout, Hyperlipemia, Hypertension, Hypertrophy of nasal turbinates, Kidney mass, Levoscoliosis, Lung nodule, Multiple thyroid nodules, NICOLE (obstructive sleep apnea), and Pulmonary hypertension.    Last VS: BP (!) 135/91 (BP Location: Right arm, Patient Position: Lying)   Pulse (!) 124   Temp 96.3 °F (35.7 °C) (Oral)   Resp (!) 21   Ht 5' 7" (1.702 m)   Wt (!) 137 kg (302 lb 0.5 oz)   SpO2 96%   Breastfeeding No   BMI 47.30 kg/m²     24H Vital Sign Range:  Temp:  [96.3 °F (35.7 °C)-98 °F (36.7 °C)]   Pulse:  [103-138]   Resp:  [18-21]   BP: (120-167)/()   SpO2:  [96 %-100 %]     Level of Consciousness (AVPU): alert    Recent Labs     07/04/24  0528 07/05/24  0441 07/06/24  0721   WBC 9.54  9.54 10.90  10.90 8.72   HGB 10.7*  10.7* 11.4*  11.4* 11.5*   HCT 34.8*  34.8* 38.3  38.3 39.4     284 344  344 343       Recent Labs     07/04/24  0808 07/05/24  0441 07/06/24  0721    137 138   K 4.6 4.7 4.4    105 105   CO2 18* 17* 16*   BUN 91* 94* 92*   CREATININE 6.9* 7.0* 6.9*   * 119* 167*   PHOS  --   --  6.6*   MG 2.4 2.5 2.5        Recent Labs     07/04/24  1746   PH 7.291*   PCO2 40.5   PO2 121*   HCO3 19.5*   POCSATURATED 98   BE -7*        OXYGEN:  Flow (L/min) (Oxygen Therapy): 2    MEWS score: 5    Rounding completed with charge RN Olesya Youssef reports pt in no distress, chronic AFib to 130s. No additional concerns verbalized at this time. Instructed to call [phone] for further concerns or assistance.    Natanael Delgado RN        "

## 2024-07-06 NOTE — ASSESSMENT & PLAN NOTE
Patient with Paroxysmal (<7 days) atrial fibrillation which is controlled currently with  diltiazem . Patient is currently in sinus rhythm.[id number] Score: 4. Pt.     -cards & EP following, appreciate recs  -patient declined heparin drip and is a hard stick, will restart eliquis  -metoprolol for rate control

## 2024-07-06 NOTE — PROGRESS NOTES
"Adalberto Amato - Cardiology [facility] Medicine  Progress Note    Patient Name: Juhi Taylor  MRN: [id number]  Patient Class: IP- Inpatient   Admission Date: 7/3/2024  Length of Stay: 3 days  Attending Physician: Herb Coreas MD  Primary Care Provider: Tony Sadler MD        Subjective:     Principal Problem:Hypoxic respiratory failure        HPI:  Ms. Taylor is a 75yo female with a PMHx of CAD, ESRD on PD, HFrEF of 40%, and afib on eliquis who was [place] [facility] [place] [facility] for a higher level of care. She originally presented to [facility] ED on 6/18 for worsening SOB and was found to be satting 89% on RA determined to be due to a heart failure exacerbation.  She was given lasix gtt and metolazone but still produced insufficient urine. HD was started, but during a dialysis session on 6/24 she suffered PEA cardiac arrest, epi was administered and she converted to V tach. After cardioversion ROSC was achieved and she was intubated and transferred to the ICU. Repeat Echo revealed an EF of 20-25% from 40% on admission but cardiology did not intervene at this time to due to her clinical condition but recommended later evaluation for ICD and angiogram. Her condition improved with further HD. She was extubated and moved to the floor but developed an uptrending leukocytosis possibly originating from a left trialysis catheter with bruising and possible purulent discharge. The line was removed. She was determined to not be a candidate for hospice. The family requested transfer to [facility] [place] higher level of care. On admission she was comfortable but minimal further history could be obtained due to patient's frustration. See  note below:       "Ms. Juhi Taylor is a 74 y.o. female with afib on Eliquis, CAD, ESRD on PD, CHF EF40%, and morbid obesity who initailly presented to the [facility] ED on 6/18 for evaluation of shortness of breath.  She had a PD catheter inserted at the beginning of June to " start PD, but because of insurance issues, her PD got delayed.  Since that time, she developed worsening SOB and weight gain.  Because her symptoms continued to worsen, she went to the ER.     Upon arrival to the ER, she was found to be satting 89% on room air.  Labs showed WBC 10, Hgb 12.5, Creatinine 4.1 and BNP 1,726.  She was started on a Lasix gtt and Nephro was consulted.  She failed to have adequate urine output despite Lasix gtt and Metolazone, so she was started on HD.  On 6/24, she got PEA cardiac arrest during dialysis.  After giving Epinephrine, the rhythm appeared to change to ventricular tachycardia.  She was cardioverted and achieved ROSC.  She was intubated and transferred to ICU on an Amiodarone gtt, which was stopped because of patient intolerance.  She was found to have a significant Troponin and Cardiology was consulted.  Echo showed a drop in EF to 20-25% from 40% prior to admission.  Cardiology decided against acute intervention because of her clinical condition, but suggested an ICD and angiogram once improved.  HD was continued and and she was able to be extubated and SD to the floor.  She was noted to have an uptrending leukocytosis. Left trailysis cathter had bruising around and possible purulent collection and the line was removed. Blood culture and tip culture have been NGTD.  She is currently on Cefepime and Vancomycin.   She then developed AFib with RVR.  She was started on Sotalol by Cardiology, which patient then refused to take once reading the ADRs.  She was then started on a Dilt gtt (with her reduced EF) and Metoprolol.  She has been seen by Palliative Care, and they are not ready for hospice.  Patient and family are not happy with the care at [facility].  They are requesting transfer to [place] for a HLOC.  She will transfer to [place] for EP, Cardiology, Nephrology and IR evaluations.     Consult EP for afib with RVR/ICD eval  Consult Cardiology for angiogram  Consult Nephro for HD  Consult  "IR/Interventional Nephro for HD line placement"          Overview/Hospital Course:  Diltiazem gtt discontinued due to reduced EF and metoprolol continued. She was initiated on a heparin drip for thromboembolism prophylaxis. PD catheter originally placed 6/4 still present. Nephrology, cardiology, and electrophysiology consulted.     Nephrology opted to trial dialysis using the PD catheter to assess function and concern for catheter leakage before placing new trialysis line. Patient underwent successful trial of PD with net -800mL. Plan to continue PD nightly and assess volume status. IV lasix added after pt reporting mild/moderate shortness of breath at rest and remaining volume overloaded.    EP was going to take patient for MILLY DCCV on 7/5 however the patient refused and stated she did not want any further interventions because believed it would not help her situation. EP discussed with the patient and family that having rate control would improve the imaging of an ischemic work-up. Knowing this the patient continued to refuse. Her heparin drip was discontinued and Eliquis restarted. Palliative consulted, ongoing discussion with family.     7/6 pt is refusing AM medications and stating that she is ready to die. All three children present in room with ongoing discussions regarding next steps. Palliative care assisting.     Interval History: Early this AM pt reported feeling somewhat improved but is still feeling SOB at rest. Tolerating dialysis at night. Endorses understanding of d/c of heparin and starting on eliquis. Per RN message and note, later in AM pt is refusing all medications and is not talking to health staff. Palliative medicine assisting in facilitating family meeting regarding GOC.       Objective:     Vital Signs (Most Recent):  Temp: 97.9 °F (36.6 °C) (07/06/24 1210)  Pulse: (!) 124 (07/06/24 1450)  Resp: (!) 21 (07/06/24 1210)  BP: 121/72 (07/06/24 1210)  SpO2: (!) 93 % (07/06/24 1210) Vital Signs " (24h Range):  Temp:  [96.3 °F (35.7 °C)-98 °F (36.7 °C)] 97.9 °F (36.6 °C)  Pulse:  [] 124  Resp:  [18-21] 21  SpO2:  [93 %-100 %] 93 %  BP: (120-167)/() 121/72     Weight: (!) 137 kg (302 lb 0.5 oz)  Body mass index is 47.3 kg/m².    Intake/Output Summary (Last 24 hours) at 7/6/2024 1453  Last data filed at 7/6/2024 0916  Gross per 24 hour   Intake --   Output 470 ml   Net -470 ml         Physical Exam  Vitals and nursing note reviewed.   Constitutional:       General: She is not in acute distress.     Appearance: She is obese. She is ill-appearing.   Cardiovascular:      Rate and Rhythm: Tachycardia present. Rhythm irregular.      Heart sounds: No murmur heard.  Pulmonary:      Effort: No respiratory distress.      Breath sounds: Examination of the right-middle field reveals rales. Examination of the left-middle field reveals rales. Examination of the right-lower field reveals rales. Examination of the left-lower field reveals rales. Decreased breath sounds and rales present.   Abdominal:      Palpations: Abdomen is soft.   Musculoskeletal:      Right lower leg: Edema present.      Left lower leg: Edema present.   Skin:     General: Skin is warm and dry.   Neurological:      General: No focal deficit present.      Mental Status: She is alert.             Significant Labs: All pertinent labs within the past 24 hours have been reviewed.  CBC:   Recent Labs   Lab 07/05/24  0441 07/06/24  0721   WBC 10.90  10.90 8.72   HGB 11.4*  11.4* 11.5*   HCT 38.3  38.3 39.4     344 343     CMP:   Recent Labs   Lab 07/05/24  0441 07/06/24  0721    138   K 4.7 4.4    105   CO2 17* 16*   * 167*   BUN 94* 92*   CREATININE 7.0* 6.9*   CALCIUM 9.5 9.6   PROT 6.5 6.6   ALBUMIN 2.7* 2.9*   BILITOT 0.9 0.9   ALKPHOS 102 99   AST 15 16   ALT 13 15   ANIONGAP 15 17*       Significant Imaging: I have reviewed all pertinent imaging results/findings within the past 24 hours.    Assessment/Plan:      *  "Hypoxic respiratory failure  Patient with Hypoxic Respiratory failure which is Acute on chronic.  she is not on home oxygen. Supplemental oxygen was provided and noted- 99% 2LNCOxygen Concentration (%):  [28] 28    .   Signs/symptoms of respiratory failure include- increased work of breathing and lethargy. Contributing diagnoses includes - CHF, Obesity Hypoventilation, and ESRD  Labs and images were reviewed. Patient Has not had a recent ABG. Will treat underlying causes and adjust management of respiratory failure as follows-     Respiratory failure likely multifactorial that includes ESRD s/p RCC w/ nephrectomy, HFrEF with significant reduction in EF following PEA arrest at OSH, obesity hypoventilation, and concern for possible uremia 2/2 inadequate PD clearance.     -Further invention pending family meeting with Palliative care    - BiPAP nightly if agreeable  - 200mg IV lasix q12hr  - New HD line if consensus reached by patient/family    CKD stage 4 secondary to hypertension  Tunneled line removed due to possible infection.  BMP reviewed- noted Estimated Creatinine Clearance: 10.7 mL/min (A) (based on SCr of 6.9 mg/dL (H)). according to latest data. Based on current GFR, CKD stage is stage 5 - GFR < 15.  Monitor UOP and serial BMP and adjust therapy as needed. Renally dose meds. Avoid nephrotoxic medications and procedures.    Additional interventions pending [facility] discussion with palliative care, pt, and family.     -nightly PD per nephrology, new HD line pending   -200mg IV lasix q12hr  -fluid restriction 1.5L  -strict I/Os      Acute on chronic combined systolic and diastolic heart failure  Prior to admission to [facility], Echo 6/19/24 w/ EF 40-45% w/ venous pressure 15mmHg. Following PEA arrest repeat echo 6/24 EF 20-25% with [facility] echo confirming reduced EF. At [facility] noted "Patient had significant elevation of troponin cardiology consulted. No acute intervention recommended because of clinical condition and later " "patient is not keen to do the angiogram was stress test."  Cardiology had planned for DCCV w/ MILLY but patient refused intervention.     -Cardiology consulted, want DCCV followed by ischemic work-up. Patient so far declining this intervention.  -Diuresis w/ 200mg IV lasix q12hr  -Metoprolol 75mg TID, plan to transition 200mg succinate daily            Atrial fibrillation with RVR  Patient with Paroxysmal (<7 days) atrial fibrillation which is controlled currently with  diltiazem . Patient is currently in sinus rhythm.[id number] Score: 4. Pt.     -cards & EP following, appreciate recs  -patient declined heparin drip and is a hard stick, will restart eliquis  -metoprolol for rate control      Severe sepsis  Presented with concerns of line infection. Cultures negative. Vitals stable.  - Discontinue cefepime    Severe obesity (BMI >= 40)  Body mass index is 47.3 kg/m². Morbid obesity complicates all aspects of disease management from diagnostic modalities to treatment. Weight loss encouraged and health benefits explained to patient.         Type 2 diabetes mellitus with microalbuminuria, without long-term current use of insulin  Last HgbA1c 6.8. Hold hypoglycemic medications while admitted.    Q4h glucose checks  Consider SSI as needed      Essential hypertension  Chronic, controlled. Latest blood pressure and vitals reviewed-     Temp:  [96.3 °F (35.7 °C)-98 °F (36.7 °C)]   Pulse:  []   Resp:  [18-21]   BP: (120-167)/()   SpO2:  [93 %-100 %] .   Home meds for hypertension were reviewed and noted below.   Hypertension Medications               amLODIPine (NORVASC) 10 MG tablet Take 10 mg by mouth once daily.    carvediloL (COREG) 25 MG tablet Take 1 tablet (25 mg total) by mouth 2 (two) times daily.    cloNIDine (CATAPRES) 0.1 MG tablet Take 1 tablet (0.1 mg total) by mouth 3 (three) times daily as needed (PRN SBP > 165 mmHg).    cloNIDine 0.1 mg/24 hr td ptwk (CATAPRES) 0.1 mg/24 hr Place 1 patch onto the " skin every 7 days.    furosemide (LASIX) 40 MG tablet Take 40 mg by mouth once daily.    nitroGLYCERIN (NITROSTAT) 0.4 MG SL tablet Place 1 tablet (0.4 mg total) under the tongue every 5 (five) minutes as needed for Chest pain.            While in the hospital, will manage blood pressure as follows; Adjust home antihypertensive regimen as follows- only metoprolol tartrate for now. Observe and resume home meds as needed.    Will utilize p.r.n. blood pressure medication only if patient's blood pressure greater than 180/110 and she develops symptoms such as worsening chest pain or shortness of breath.      VTE Risk Mitigation (From admission, onward)           Ordered     apixaban tablet 5 mg  2 times daily         07/05/24 1449     Place sequential compression device  Until discontinued         07/03/24 0450                    Discharge Planning   HARINI: 7/8/2024     Code Status: Full Code   Is the patient medically ready for discharge?:     Reason for patient still in hospital (select all that apply): Patient unstable, Patient trending condition, Treatment, Consult recommendations, and Pending disposition  Discharge Plan A: Rehab          Grant Jean MD  Department of Hospital Medicine   [facility] - Cardiology Stepdown

## 2024-07-06 NOTE — PROGRESS NOTES
07/06/24 0744 07/06/24 0751 07/06/24 0808   Vital Signs   BP (!) 160/121 (!) 167/114 (!) 135/91   MAP (mmHg) 137 138 106   BP Location Right arm Right arm Right arm   Patient Position Lying Lying Lying     Per MD, patient refused cardioversion. Scheduled metoprolol increased to 75mg. Family at bedside.

## 2024-07-06 NOTE — NURSING
Patient refused AM meds. Patient also refused to answer orientation questions during morning assessment. Team notified of patient current status. Daughters at bedside.     1100 Pending patients son's arrival for family meeting with palliative MD. Nurse to notify palliative MD when son has arrived. Two daughters bedside.

## 2024-07-06 NOTE — ASSESSMENT & PLAN NOTE
Chronic, controlled. Latest blood pressure and vitals reviewed-     Temp:  [96.3 °F (35.7 °C)-98 °F (36.7 °C)]   Pulse:  []   Resp:  [18-21]   BP: (120-167)/()   SpO2:  [93 %-100 %] .   Home meds for hypertension were reviewed and noted below.   Hypertension Medications               amLODIPine (NORVASC) 10 MG tablet Take 10 mg by mouth once daily.    carvediloL (COREG) 25 MG tablet Take 1 tablet (25 mg total) by mouth 2 (two) times daily.    cloNIDine (CATAPRES) 0.1 MG tablet Take 1 tablet (0.1 mg total) by mouth 3 (three) times daily as needed (PRN SBP > 165 mmHg).    cloNIDine 0.1 mg/24 hr td ptwk (CATAPRES) 0.1 mg/24 hr Place 1 patch onto the skin every 7 days.    furosemide (LASIX) 40 MG tablet Take 40 mg by mouth once daily.    nitroGLYCERIN (NITROSTAT) 0.4 MG SL tablet Place 1 tablet (0.4 mg total) under the tongue every 5 (five) minutes as needed for Chest pain.            While in the hospital, will manage blood pressure as follows; Adjust home antihypertensive regimen as follows- only metoprolol tartrate for now. Observe and resume home meds as needed.    Will utilize p.r.n. blood pressure medication only if patient's blood pressure greater than 180/110 and she develops symptoms such as worsening chest pain or shortness of breath.

## 2024-07-06 NOTE — PROGRESS NOTES
07/06/24 0916   Peritoneal Dialysis   Exchange Type Cycler   Peritoneal Treatment Status Completed   Cycler Peritoneal Dialysis   Initial Drain Volume (mL) 3 mL   Effluent Appearance Yellow   Total Volume (mL) 9999 mL   Average Dwell Time (specify hr/min) 90   Cycler PD Total UF (mL) 70 mL   Cycler Treatment Comments CCPD tx completed     PD tx completed pt disconnected and capped. Pt family would like to speak to nephro team today, nephrology notified. Pt left in NAD.

## 2024-07-07 NOTE — PROGRESS NOTES
"[facility] [place] [facility]  Progress Note    Patient Name: Juhi Taylor  MRN: [id number]  Patient Class: IP- Inpatient   Admission Date: 7/3/2024  Length of Stay: 4 days  Attending Physician: Herb Coreas MD  Primary Care Provider: Tony Sadler MD        Subjective:     Principal Problem:Hypoxic respiratory failure        HPI:  Ms. Taylor is a 73yo female with a PMHx of CAD, ESRD on PD, HFrEF of 40%, and afib on eliquis who was transferred to [facility] from [facility] for a higher level of care. She originally presented to [facility] ED on 6/18 for worsening SOB and was found to be satting 89% on RA determined to be due to a heart failure exacerbation.  She was given lasix gtt and metolazone but still produced insufficient urine. HD was started, but during a dialysis session on 6/24 she suffered PEA cardiac arrest, epi was administered and she converted to V tach. After cardioversion ROSC was achieved and she was intubated and transferred to the ICU. Repeat Echo revealed an EF of 20-25% from 40% on admission but cardiology did not intervene at this time to due to her clinical condition but recommended later evaluation for ICD and angiogram. Her condition improved with further HD. She was extubated and moved to the floor but developed an uptrending leukocytosis possibly originating from a left trialysis catheter with bruising and possible purulent discharge. The line was removed. She was determined to not be a candidate for hospice. The family requested transfer to [facility] for a higher level of care. On admission she was comfortable but minimal further history could be obtained due to patient's frustration. See  note below:       "Ms. Juhi Taylor is a 74 y.o. female with afib on Eliquis, CAD, ESRD on PD, CHF EF40%, and morbid obesity who initailly presented to the [facility] ED on 6/18 for evaluation of shortness of breath.  She had a PD catheter inserted at the beginning of June to " start PD, but because of insurance issues, her PD got delayed.  Since that time, she developed worsening SOB and weight gain.  Because her symptoms continued to worsen, she went to the ER.     Upon arrival to the ER, she was found to be satting 89% on room air.  Labs showed WBC 10, Hgb 12.5, Creatinine 4.1 and BNP 1,726.  She was started on a Lasix gtt and Nephro was consulted.  She failed to have adequate urine output despite Lasix gtt and Metolazone, so she was started on HD.  On 6/24, she got PEA cardiac arrest during dialysis.  After giving Epinephrine, the rhythm appeared to change to ventricular tachycardia.  She was cardioverted and achieved ROSC.  She was intubated and transferred to ICU on an Amiodarone gtt, which was stopped because of patient intolerance.  She was found to have a significant Troponin and Cardiology was consulted.  Echo showed a drop in EF to 20-25% from 40% prior to admission.  Cardiology decided against acute intervention because of her clinical condition, but suggested an ICD and angiogram once improved.  HD was continued and and she was able to be extubated and SD to the floor.  She was noted to have an uptrending leukocytosis. Left trailysis cathter had bruising around and possible purulent collection and the line was removed. Blood culture and tip culture have been NGTD.  She is currently on Cefepime and Vancomycin.   She then developed AFib with RVR.  She was started on Sotalol by Cardiology, which patient then refused to take once reading the ADRs.  She was then started on a Dilt gtt (with her reduced EF) and Metoprolol.  She has been seen by Palliative Care, and they are not ready for hospice.  Patient and family are not happy with the care at [facility].  They are requesting transfer to [facility] for a HLOC.  She will transfer to [facility] for EP, Cardiology, Nephrology and IR evaluations.     Consult EP for afib with RVR/ICD eval  Consult Cardiology for angiogram  Consult Nephro for HD  Consult  "IR/Interventional Nephro for HD line placement"          Overview/Hospital Course:  Diltiazem gtt discontinued due to reduced EF and metoprolol continued. She was initiated on a heparin drip for thromboembolism prophylaxis. PD catheter originally placed 6/4 still present. Nephrology, cardiology, and electrophysiology consulted.     Nephrology opted to trial dialysis using the PD catheter to assess function and concern for catheter leakage before placing new trialysis line. Patient underwent successful trial of PD with net -800mL. Plan to continue PD nightly and assess volume status. IV lasix added after pt reporting mild/moderate shortness of breath at rest and remaining volume overloaded.    EP was going to take patient for MILLY DCCV on 7/5 however the patient refused and stated she did not want any further interventions because believed it would not help her situation. EP discussed with the patient and family that having rate control would improve the imaging of an ischemic work-up. Knowing this the patient continued to refuse. Her heparin drip was discontinued and Eliquis restarted. Palliative consulted, ongoing discussion with family.     7/6 pt is refusing AM medications and stating that she is ready to die. All three children present in room with ongoing discussions regarding next steps. Palliative care assisting. Night of 7/6, overnight MD paged about increasing agitation, gave IM zyprexa. The following morning she is intermittently responding to questions with yes/no and cannot orient self to time, place, person or situation.     Following discussion, family has decided they would like to proceed with a timed trial of HD via insertion of a new tunneled catheter. Interventional nephrology consulted. Tentatively planning for placement 7/8 AM.     Interval History: Overnight MD paged for increasing restlessness and agitation. IM zyprexa given. This morning she is restless in bed and responding with intermittent " yes/no to some questioning. Not oriented to person, place, time, or situation. Daughter, Maria Luisa, at bedside last night and this morning. Family discussion yesterday without clear consensus among children. Plan to continue GOC with family today.     PM update: Family notified RN  that they have elected to proceed with a trial of HD, requiring placement of a new tunneled line.     Review of Systems   Reason unable to perform ROS: pt not able to resond appropriately to questioning.     Objective:     Vital Signs (Most Recent):  Temp: 97.7 °F (36.5 °C) (07/07/24 1141)  Pulse: 92 (07/07/24 1141)  Resp: 18 (07/07/24 1141)  BP: 117/88 (07/07/24 1141)  SpO2: 96 % (07/07/24 1141) Vital Signs (24h Range):  Temp:  [96.4 °F (35.8 °C)-98.5 °F (36.9 °C)] 97.7 °F (36.5 °C)  Pulse:  [] 92  Resp:  [18-22] 18  SpO2:  [92 %-99 %] 96 %  BP: (111-163)/() 117/88     Weight: (!) 137 kg (302 lb 0.5 oz)  Body mass index is 47.3 kg/m².    Intake/Output Summary (Last 24 hours) at 7/7/2024 1359  Last data filed at 7/7/2024 0824  Gross per 24 hour   Intake 0 ml   Output 244 ml   Net -244 ml         Physical Exam  Vitals and nursing note reviewed.   Constitutional:       General: She is not in acute distress.     Appearance: She is obese. She is ill-appearing (restless in bed).   Cardiovascular:      Rate and Rhythm: Tachycardia present. Rhythm irregular.      Heart sounds: No murmur heard.  Pulmonary:      Effort: No respiratory distress.      Breath sounds: Examination of the right-middle field reveals rales. Examination of the left-middle field reveals rales. Examination of the right-lower field reveals rales. Examination of the left-lower field reveals rales. Decreased breath sounds and rales present.   Abdominal:      Palpations: Abdomen is soft.   Musculoskeletal:      Right lower leg: Edema present.      Left lower leg: Edema present.   Skin:     General: Skin is warm and dry.   Neurological:      General: No focal deficit  present.      Mental Status: She is alert.             Significant Labs: All pertinent labs within the past 24 hours have been reviewed.  CBC:   Recent Labs   Lab 07/06/24  0721 07/07/24  0601   WBC 8.72 10.10   HGB 11.5* 11.4*   HCT 39.4 38.0    337     CMP:   Recent Labs   Lab 07/06/24  0721 07/07/24  0601    138   K 4.4 4.2    104   CO2 16* 16*   * 123*   BUN 92* 90*   CREATININE 6.9* 7.6*   CALCIUM 9.6 9.5   PROT 6.6 6.3   ALBUMIN 2.9* 2.8*   BILITOT 0.9 1.1*   ALKPHOS 99 100   AST 16 23   ALT 15 18   ANIONGAP 17* 18*       Significant Imaging: I have reviewed all pertinent imaging results/findings within the past 24 hours.    Assessment/Plan:      * Hypoxic respiratory failure  Patient with Hypoxic Respiratory failure which is Acute on chronic.  she is not on home oxygen. Supplemental oxygen was provided and noted- 99% 2LNC     .   Signs/symptoms of respiratory failure include- increased work of breathing and lethargy. Contributing diagnoses includes - CHF, Obesity Hypoventilation, and ESRD  Labs and images were reviewed. Patient Has not had a recent ABG. Will treat underlying causes and adjust management of respiratory failure as follows-     Respiratory failure likely multifactorial that includes ESRD s/p RCC w/ nephrectomy, HFrEF with significant reduction in EF following PEA arrest at OSH, obesity hypoventilation, and concern for possible uremia 2/2 inadequate PD clearance.     -Further invention pending family meeting with Palliative care    - BiPAP nightly if agreeable  - New HD line plan for tomorrow    CKD stage 4 secondary to hypertension  Tunneled line removed due to possible infection.  BMP reviewed- noted Estimated Creatinine Clearance: 9.7 mL/min (A) (based on SCr of 7.6 mg/dL (H)). according to latest data. Based on current GFR, CKD stage is stage 5 - GFR < 15.  Monitor UOP and serial BMP and adjust therapy as needed. Renally dose meds. Avoid nephrotoxic medications and  "procedures.    Additional interventions pending [facility] discussion with palliative care, pt, and family.     -nightly PD per nephrology, new HD line placement plan for tomorrow  -interventional nephrology consulted   -d/c lasix  -fluid restriction 1.5L  -strict I/Os      Acute on chronic combined systolic and diastolic heart failure  Prior to admission to [facility], Echo 6/19/24 w/ EF 40-45% w/ venous pressure 15mmHg. Following PEA arrest repeat echo 6/24 EF 20-25% with C echo confirming reduced EF. At [facility] noted "Patient had significant elevation of troponin cardiology consulted. No acute intervention recommended because of clinical condition and later patient is not keen to do the angiogram was stress test."  Cardiology had planned for DCCV w/ MILLY but patient refused intervention.     -Cardiology consulted, want DCCV followed by ischemic work-up. Patient so far declining this intervention.  -Stop diuresis w/ 200mg IV lasix q12hr, little to no urine production   -Metoprolol 75mg TID, plan to transition 200mg succinate daily as tolerated            Atrial fibrillation with RVR  Patient with Paroxysmal (<7 days) atrial fibrillation which is poorly controlled currently with  metoprolol . Patient is currently in sinus rhythm.[id number] Score: 4. Pt did not tolerate amiodarone. Noted declined sotalol. Diltiazem d/cd with cardiac hx.     -cards & EP following, appreciate recs  -d/c eliquis in anticipation of new line  -sq heparin started  -metoprolol for rate control, titrating as tolerated      Severe sepsis  Presented with concerns of line infection. Cultures negative. Vitals stable.  - Discontinue cefepime    Severe obesity (BMI >= 40)  Body mass index is 47.3 kg/m². Morbid obesity complicates all aspects of disease management from diagnostic modalities to treatment. Weight loss encouraged and health benefits explained to patient.         Type 2 diabetes mellitus with microalbuminuria, without long-term current use of " insulin  Last HgbA1c 6.8. Hold hypoglycemic medications while admitted.    Q4h glucose checks  Consider SSI as needed      Essential hypertension  Chronic, controlled. Latest blood pressure and vitals reviewed-     Temp:  [96.4 °F (35.8 °C)-98.5 °F (36.9 °C)]   Pulse:  []   Resp:  [18-22]   BP: (111-163)/()   SpO2:  [92 %-99 %] .   Home meds for hypertension were reviewed and noted below.   Hypertension Medications               amLODIPine (NORVASC) 10 MG tablet Take 10 mg by mouth once daily.    carvediloL (COREG) 25 MG tablet Take 1 tablet (25 mg total) by mouth 2 (two) times daily.    cloNIDine (CATAPRES) 0.1 MG tablet Take 1 tablet (0.1 mg total) by mouth 3 (three) times daily as needed (PRN SBP > 165 mmHg).    cloNIDine 0.1 mg/24 hr td ptwk (CATAPRES) 0.1 mg/24 hr Place 1 patch onto the skin every 7 days.    furosemide (LASIX) 40 MG tablet Take 40 mg by mouth once daily.    nitroGLYCERIN (NITROSTAT) 0.4 MG SL tablet Place 1 tablet (0.4 mg total) under the tongue every 5 (five) minutes as needed for Chest pain.            While in the hospital, will manage blood pressure as follows; Adjust home antihypertensive regimen as follows- only metoprolol tartrate for now. Observe and resume home meds as needed.    Will utilize p.r.n. blood pressure medication only if patient's blood pressure greater than 180/110 and she develops symptoms such as worsening chest pain or shortness of breath.      VTE Risk Mitigation (From admission, onward)           Ordered     heparin (porcine) injection 5,000 Units  Every 8 hours         07/07/24 1335     Place sequential compression device  Until discontinued         07/03/24 0450                    Discharge Planning   HARINI: 7/8/2024     Code Status: Full Code   Is the patient medically ready for discharge?:     Reason for patient still in hospital (select all that apply): Patient unstable, Patient trending condition, Laboratory test, Treatment, and Consult  recommendations  Discharge Plan A: Rehab            Grant Jean MD  Department of Hospital Medicine   [facility] - Cardiology Stepdown

## 2024-07-07 NOTE — ASSESSMENT & PLAN NOTE
Chronic, controlled. Latest blood pressure and vitals reviewed-     Temp:  [96.4 °F (35.8 °C)-98.5 °F (36.9 °C)]   Pulse:  []   Resp:  [18-22]   BP: (111-163)/()   SpO2:  [92 %-99 %] .   Home meds for hypertension were reviewed and noted below.   Hypertension Medications               amLODIPine (NORVASC) 10 MG tablet Take 10 mg by mouth once daily.    carvediloL (COREG) 25 MG tablet Take 1 tablet (25 mg total) by mouth 2 (two) times daily.    cloNIDine (CATAPRES) 0.1 MG tablet Take 1 tablet (0.1 mg total) by mouth 3 (three) times daily as needed (PRN SBP > 165 mmHg).    cloNIDine 0.1 mg/24 hr td ptwk (CATAPRES) 0.1 mg/24 hr Place 1 patch onto the skin every 7 days.    furosemide (LASIX) 40 MG tablet Take 40 mg by mouth once daily.    nitroGLYCERIN (NITROSTAT) 0.4 MG SL tablet Place 1 tablet (0.4 mg total) under the tongue every 5 (five) minutes as needed for Chest pain.            While in the hospital, will manage blood pressure as follows; Adjust home antihypertensive regimen as follows- only metoprolol tartrate for now. Observe and resume home meds as needed.    Will utilize p.r.n. blood pressure medication only if patient's blood pressure greater than 180/110 and she develops symptoms such as worsening chest pain or shortness of breath.

## 2024-07-07 NOTE — PROGRESS NOTES
"   07/07/24 1141   Vital Signs   Temp 97.7 °F (36.5 °C)   Temp Source Axillary   Pulse 92   Resp 18   SpO2 96 %   /88   MAP (mmHg) 95   BP Location Right leg   Patient Position Lying     Pt's daughter Maria Luisa, concern for pt's deteriorating condition.  She says the pt has become more drowsier and less interactive than yesterday. Upon assessment pt only verbalizing "Yes" to the questions. Lyudmila BACA, and the team making round and at the bedside. POCT glucose: 100. Planned to get HD catheter tomorrow.    "

## 2024-07-07 NOTE — ASSESSMENT & PLAN NOTE
Patient with Hypoxic Respiratory failure which is Acute on chronic.  she is not on home oxygen. Supplemental oxygen was provided and noted- 99% 2LNC     .   Signs/symptoms of respiratory failure include- increased work of breathing and lethargy. Contributing diagnoses includes - CHF, Obesity Hypoventilation, and ESRD  Labs and images were reviewed. Patient Has not had a recent ABG. Will treat underlying causes and adjust management of respiratory failure as follows-     Respiratory failure likely multifactorial that includes ESRD s/p RCC w/ nephrectomy, HFrEF with significant reduction in EF following PEA arrest at OSH, obesity hypoventilation, and concern for possible uremia 2/2 inadequate PD clearance.     -Further invention pending family meeting with Palliative care    - BiPAP nightly if agreeable  - New HD line plan for tomorrow

## 2024-07-07 NOTE — PLAN OF CARE
Alert and withdrawn, denies pain, daughter at side, plan of care explained to daughter and patient, restless and confused, refusing to eat and drink, agreed to take medication after explaining that its for comfort measures, will continue to monitor, daughter at side, tele sitter monitor.    Problem: Adult Inpatient Plan of Care  Goal: Plan of Care Review  Outcome: Progressing  Goal: Absence of Hospital-Acquired Illness or Injury  Outcome: Progressing  Goal: Optimal Comfort and Wellbeing  Outcome: Progressing  Goal: Readiness for Transition of Care  Outcome: Progressing     Problem: Diabetes Comorbidity  Goal: Blood Glucose Level Within Targeted Range  Outcome: Progressing     Problem: Wound  Goal: Absence of Infection Signs and Symptoms  Outcome: Progressing     Problem: Skin Injury Risk Increased  Goal: Skin Health and Integrity  Outcome: Progressing     Problem: Fall Injury Risk  Goal: Absence of Fall and Fall-Related Injury  Outcome: Progressing

## 2024-07-07 NOTE — ASSESSMENT & PLAN NOTE
"Prior to admission to [facility], Echo 6/19/24 w/ EF 40-45% w/ venous pressure 15mmHg. Following PEA arrest repeat echo 6/24 EF 20-25% with [facility] [place] confirming reduced EF. At [facility] noted "Patient had significant elevation of troponin cardiology consulted. No acute intervention recommended because of clinical condition and later patient is not keen to do the angiogram was stress test."  Cardiology had planned for DCCV w/ MILLY but patient refused intervention.     -Cardiology consulted, want DCCV followed by ischemic work-up. Patient so far declining this intervention.  -Stop diuresis w/ 200mg IV lasix q12hr, little to no urine production   -Metoprolol 75mg TID, plan to transition 200mg succinate daily as tolerated          "

## 2024-07-07 NOTE — NURSING
"RAPID RESPONSE NURSE CHART REVIEW        Chart Reviewed: 07/07/2024, 6:05 AM    MRN: [id number]  Bed: 330/330 A    Dx: Hypoxic respiratory failure    Juhi Taylor has a past medical history of Anticoagulant long-term use, Arthritis, Breast cancer, Cancer of kidney, CHF (congestive heart failure), Coronary artery disease, Depression, Diabetes mellitus, Diastolic heart failure secondary to hypertension, Gout, Hyperlipemia, Hypertension, Hypertrophy of nasal turbinates, Kidney mass, Levoscoliosis, Lung nodule, Multiple thyroid nodules, NICOLE (obstructive sleep apnea), and Pulmonary hypertension.    Last VS: BP (!) 163/100 (BP Location: Right arm, Patient Position: Lying)   Pulse (!) 135   Temp 97.8 °F (36.6 °C) (Oral)   Resp 18   Ht 5' 7" (1.702 m)   Wt (!) 137 kg (302 lb 0.5 oz)   SpO2 99%   Breastfeeding No   BMI 47.30 kg/m²     24H Vital Sign Range:  Temp:  [96.3 °F (35.7 °C)-98.5 °F (36.9 °C)]   Pulse:  []   Resp:  [18-22]   BP: (121-167)/()   SpO2:  [92 %-99 %]     Level of Consciousness (AVPU): alert    Recent Labs     07/05/24  0441 07/06/24  0721   WBC 10.90  10.90 8.72   HGB 11.4*  11.4* 11.5*   HCT 38.3  38.3 39.4     344 343       Recent Labs     07/04/24  0808 07/05/24  0441 07/06/24  0721    137 138   K 4.6 4.7 4.4    105 105   CO2 18* 17* 16*   BUN 91* 94* 92*   CREATININE 6.9* 7.0* 6.9*   * 119* 167*   PHOS  --   --  6.6*   MG 2.4 2.5 2.5        Recent Labs     07/04/24  1746   PH 7.291*   PCO2 40.5   PO2 121*   HCO3 19.5*   POCSATURATED 98   BE -7*        OXYGEN:  Flow (L/min) (Oxygen Therapy): 2  Oxygen Concentration (%): 28       MEWS score: 4    Rounding completed with charge NEENA Keene, reports pt has an elevated HR but was able to receive Metoprolol PO. Pt refusing all other care at this time. No additional concerns verbalized at this time. Instructed to call [phone] for further concerns or assistance.    Malika Washburn RN       "

## 2024-07-07 NOTE — SUBJECTIVE & OBJECTIVE
Interval History: Overnight MD paged for increasing restlessness and agitation. IM zyprexa given. This morning she is restless in bed and responding with intermittent yes/no to some questioning. Not oriented to person, place, time, or situation. Daughter, Maria Luisa, at bedside last night and this morning. Family discussion yesterday without clear consensus among children. Plan to continue GOC with family today.     PM update: Family notified RN  that they have elected to proceed with a trial of HD, requiring placement of a new tunneled line.     Review of Systems   Reason unable to perform ROS: pt not able to resond appropriately to questioning.     Objective:     Vital Signs (Most Recent):  Temp: 97.7 °F (36.5 °C) (07/07/24 1141)  Pulse: 92 (07/07/24 1141)  Resp: 18 (07/07/24 1141)  BP: 117/88 (07/07/24 1141)  SpO2: 96 % (07/07/24 1141) Vital Signs (24h Range):  Temp:  [96.4 °F (35.8 °C)-98.5 °F (36.9 °C)] 97.7 °F (36.5 °C)  Pulse:  [] 92  Resp:  [18-22] 18  SpO2:  [92 %-99 %] 96 %  BP: (111-163)/() 117/88     Weight: (!) 137 kg (302 lb 0.5 oz)  Body mass index is 47.3 kg/m².    Intake/Output Summary (Last 24 hours) at 7/7/2024 1359  Last data filed at 7/7/2024 0824  Gross per 24 hour   Intake 0 ml   Output 244 ml   Net -244 ml         Physical Exam  Vitals and nursing note reviewed.   Constitutional:       General: She is not in acute distress.     Appearance: She is obese. She is ill-appearing (restless in bed).   Cardiovascular:      Rate and Rhythm: Tachycardia present. Rhythm irregular.      Heart sounds: No murmur heard.  Pulmonary:      Effort: No respiratory distress.      Breath sounds: Examination of the right-middle field reveals rales. Examination of the left-middle field reveals rales. Examination of the right-lower field reveals rales. Examination of the left-lower field reveals rales. Decreased breath sounds and rales present.   Abdominal:      Palpations: Abdomen is soft.    Musculoskeletal:      Right lower leg: Edema present.      Left lower leg: Edema present.   Skin:     General: Skin is warm and dry.   Neurological:      General: No focal deficit present.      Mental Status: She is alert.             Significant Labs: All pertinent labs within the past 24 hours have been reviewed.  CBC:   Recent Labs   Lab 07/06/24  0721 07/07/24 0601   WBC 8.72 10.10   HGB 11.5* 11.4*   HCT 39.4 38.0    337     CMP:   Recent Labs   Lab 07/06/24  0721 07/07/24 0601    138   K 4.4 4.2    104   CO2 16* 16*   * 123*   BUN 92* 90*   CREATININE 6.9* 7.6*   CALCIUM 9.6 9.5   PROT 6.6 6.3   ALBUMIN 2.9* 2.8*   BILITOT 0.9 1.1*   ALKPHOS 99 100   AST 16 23   ALT 15 18   ANIONGAP 17* 18*       Significant Imaging: I have reviewed all pertinent imaging results/findings within the past 24 hours.

## 2024-07-07 NOTE — NURSING
Patient agitated and restless trying to get out of be,  notified about agitation and no input or output since previous shift except for one small unmeasured output, Zyprexa IM given, will continue to monitor.

## 2024-07-07 NOTE — ASSESSMENT & PLAN NOTE
Patient with Paroxysmal (<7 days) atrial fibrillation which is poorly controlled currently with  metoprolol . Patient is currently in sinus rhythm.[id number] Score: 4. Pt did not tolerate amiodarone. Noted declined sotalol. Diltiazem d/cd with cardiac hx.     -cards & EP following, appreciate recs  -d/c eliquis in anticipation of new line  -sq heparin started  -metoprolol for rate control, titrating as tolerated

## 2024-07-07 NOTE — PROGRESS NOTES
07/06/24 2045   Peritoneal Dialysis   Exchange Type Cycler   Peritoneal Treatment Status Started   Dianeal Solution Dextrose 2.5% in 6000 mL   Cycler Peritoneal Dialysis   Cycler Treatment Comments CCPD started     Patient agreeable to do PD tonight. CCPD tx started per MD orders. Lines connected aseptically. POC discussed with patient and verbalized understanding. Report given to primary nurse. Daughter at bedside.

## 2024-07-07 NOTE — PROGRESS NOTES
07/07/24 0824        Peritoneal Dialysis Catheter 06/04/24 Other Left lower abdomen   Placement Date: 06/04/24   Present Prior to Hospital Arrival?: No  Inserted by: MD  Catheter Type: (c) Other  Catheter Location: Left lower abdomen   Site Assessment Clean;Dry;Intact   Dressing Intervention Integrity maintained   Status Deaccessed   Dressing Status Clean;Intact;Dry   Dressing Gauze   Securement secured to abdomen with tape   Clamp Status clamped   Exchange completed   Cycler Peritoneal Dialysis   Initial Drain Volume (mL) 19 mL   Effluent Appearance Yellow   Number of Cycles 4   Fill Volume (mL) 1000 mL   Total Volume (mL) 59936 mL   Average Dwell Time (specify hr/min) 81   Cycler PD Total UF (mL) 244 mL   Cycler Treatment Comments PD completed     PD completed

## 2024-07-07 NOTE — CARE UPDATE
"RAPID RESPONSE NURSE CHART REVIEW        Chart Reviewed: 07/07/2024, 10:54 AM    MRN: [id number]  Bed: 330/330 A    Dx: Hypoxic respiratory failure    Juhi Taylor has a past medical history of Anticoagulant long-term use, Arthritis, Breast cancer, Cancer of kidney, CHF (congestive heart failure), Coronary artery disease, Depression, Diabetes mellitus, Diastolic heart failure secondary to hypertension, Gout, Hyperlipemia, Hypertension, Hypertrophy of nasal turbinates, Kidney mass, Levoscoliosis, Lung nodule, Multiple thyroid nodules, NICOLE (obstructive sleep apnea), and Pulmonary hypertension.    Last VS: /80 (BP Location: Right arm, Patient Position: Lying)   Pulse (!) 138   Temp 97.8 °F (36.6 °C) (Axillary)   Resp 18   Ht 5' 7" (1.702 m)   Wt (!) 137 kg (302 lb 0.5 oz)   SpO2 98%   Breastfeeding No   BMI 47.30 kg/m²     24H Vital Sign Range:  Temp:  [96.4 °F (35.8 °C)-98.5 °F (36.9 °C)]   Pulse:  []   Resp:  [18-22]   BP: (111-163)/()   SpO2:  [92 %-99 %]     Level of Consciousness (AVPU): unresponsive    Recent Labs     07/05/24  0441 07/06/24  0721 07/07/24  0601   WBC 10.90  10.90 8.72 10.10   HGB 11.4*  11.4* 11.5* 11.4*   HCT 38.3  38.3 39.4 38.0     344 343 337       Recent Labs     07/05/24  0441 07/06/24  0721 07/07/24  0601    138 138   K 4.7 4.4 4.2    105 104   CO2 17* 16* 16*   BUN 94* 92* 90*   CREATININE 7.0* 6.9* 7.6*   * 167* 123*   PHOS  --  6.6* 6.5*   MG 2.5 2.5 2.4        Recent Labs     07/04/24  1746   PH 7.291*   PCO2 40.5   PO2 121*   HCO3 19.5*   POCSATURATED 98   BE -7*      OXYGEN:  Flow (L/min) (Oxygen Therapy): 2    MEWS score: 3    Rounding completed with charge RN Olesya Youssef reports pt in no distress, chronic AFib to 130s.  Pt still refusing interventions. No additional concerns verbalized at this time. Instructed to call [phone] for further concerns or assistance     Natanael Delgado RN          "

## 2024-07-07 NOTE — ASSESSMENT & PLAN NOTE
Tunneled line removed due to possible infection.  BMP reviewed- noted Estimated Creatinine Clearance: 9.7 mL/min (A) (based on SCr of 7.6 mg/dL (H)). according to latest data. Based on current GFR, CKD stage is stage 5 - GFR < 15.  Monitor UOP and serial BMP and adjust therapy as needed. Renally dose meds. Avoid nephrotoxic medications and procedures.    Additional interventions pending [facility] discussion with palliative care, pt, and family.     -nightly PD per nephrology, new HD line placement plan for tomorrow  -interventional nephrology consulted   -d/c lasix  -fluid restriction 1.5L  -strict I/Os

## 2024-07-07 NOTE — PROGRESS NOTES
Progress Note  Nephrology          SUBJECTIVE:     No overnight events, s/p PD last night with only 244 ml UF, awaiting the care plan decision     Review of Systems   She is not talking       OBJECTIVE:     Medications:   apixaban  5 mg Oral BID    aspirin  81 mg Oral Daily    famotidine  20 mg Oral Daily    furosemide (LASIX) injection  200 mg Intravenous Q12H    gentamicin   Topical (Top) TID    metoprolol tartrate  75 mg Oral TID         Vitals:    07/07/24 1141   BP: 117/88   Pulse: 92   Resp: 18   Temp: 97.7 °F (36.5 °C)     I/O last 3 completed shifts:  In: 0   Out: 470 [Urine:400; Other:70]    Physical Exam:  General: ill-appearing   HENT: Normal mouth nose and ears.  Neck: no JVD and thyroid not enlarged, symmetric, no tenderness/mass/nodules  Lungs: b/l crackles   Cardiovascular: regular rate and rhythm, S1, S2 normal, no murmur, click, rub or gallop.   Abdomen: soft, non-distented; bowel sounds normal  Skin: No rashes or lesions  Musculoskeletal:edema, no deformities.       Dialysis Access: PD cath         Laboratory:  Recent Labs   Lab 07/05/24 0441 07/06/24  0721 07/07/24  0601   WBC 10.90  10.90 8.72 10.10   HGB 11.4*  11.4* 11.5* 11.4*   HCT 38.3  38.3 39.4 38.0     344 343 337   MONO 10.6  10.6  1.2*  1.2* 10.2  0.9 10.5  1.1*   EOSINOPHIL 1.3  1.3 0.6 0.8       Recent Labs   Lab 07/05/24 0441 07/06/24  0721 07/07/24  0601    138 138   K 4.7 4.4 4.2    105 104   CO2 17* 16* 16*   BUN 94* 92* 90*   CREATININE 7.0* 6.9* 7.6*   CALCIUM 9.5 9.6 9.5   PHOS  --  6.6* 6.5*           ASSESSMENT/PLAN:     ESRD ON PD   74-year-old female with DMT2, ESRD on HD, pAF, CAD s/p PCI and HFrEF is admitted as a transfer for acute HFrEF in setting of CKD5 and recent PEA arrest during dialysis. Recent PD catheter placement on 6/4 but concerned for a possibly leak from cath. Unable to initiate dialysis for 2 weeks PTA to OSH due to insurance issues. Hospital course complicated by infected  tunneled cath removed on 6/30. Transferred here for higher level of care. Initial plan at OSH was to replace tunneled catheter on the left side due to concerns to that right side may not by suitable. Peritoneal fluid studies negative for peritonitis.      Plan:  - pt is not clearing with PD and is volume overloaded despite the high dose lasix   - awaiting for family decision for plan of care and transition to HD   - Fluconazole per ISPD guidelines as prophylaxis  - Trend RFP; replete electrolytes PRN for goal K > 4, Phos > 3, Mg > 2  - Strict I&Os  - Avoid nephrotoxic agents  - Renally adjust medications      Thank you for your consult. I will follow-up with patient. Please contact us if you have any additional questions.      Carlin Stewart MD  Nephrology Attending

## 2024-07-08 PROBLEM — R65.20 SEVERE SEPSIS: Status: RESOLVED | Noted: 2024-07-01 | Resolved: 2024-07-08

## 2024-07-08 PROBLEM — Z71.89 ACP (ADVANCE CARE PLANNING): Status: ACTIVE | Noted: 2024-07-08

## 2024-07-08 PROBLEM — A41.9 SEVERE SEPSIS: Status: RESOLVED | Noted: 2024-07-01 | Resolved: 2024-07-08

## 2024-07-08 PROBLEM — Z51.5 PALLIATIVE CARE ENCOUNTER: Status: ACTIVE | Noted: 2024-07-08

## 2024-07-08 NOTE — ASSESSMENT & PLAN NOTE
Echo    Left Ventricle: The left ventricle is moderately dilated. Normal wall thickness. Global hypokinesis present. There is severely reduced systolic function with a visually estimated ejection fraction of 20 - 25%.    Right Ventricle: Mild right ventricular enlargement. Wall thickness is normal. Right ventricle wall motion has global hypokinesis. Systolic function is moderately reduced.    Left Atrium: Left atrium is severely dilated.    Right Atrium: Right atrium is severely dilated.    Aortic Valve: The aortic valve is a trileaflet valve. There is mild aortic valve sclerosis.    Mitral Valve: There is mild regurgitation.    Tricuspid Valve: There is severe regurgitation.    Pulmonary Artery: There is moderate pulmonary hypertension. The estimated pulmonary artery systolic pressure is 61 mmHg.    IVC/SVC: Elevated venous pressure at 15 mmHg.    Pericardium: There is a trivial circumferential effusion. No indication of cardiac tamponade.

## 2024-07-08 NOTE — HPI
Juhi Taylor is a 74-year-old female with a past medical history of CKD stage 5 with recent PD catheter placement on 6/4, RCC s/p right nephrectomy, T2DM, obesity, CAD s/p PCI to Lcx and LAD in 11/2020 and HFmREF who is admitted as a transfer from [facility] for higher level of care and further cardiac evaluation. Nephrology consulted for hemodialysis.     She initially presented on 6/18/24 to [facility] for hypervolemia in the setting of CKD 4-5 for which she had undergone elective PD catheter placement complicated by catheter site leaking. She had a complex hospital course, was initially placed on furosemide gtt without significant improvement and later underwent placement of tunneled line for HD with volume removal. During her second dialysis session she went into PEA arrest for which she was resuscitated, intubated and transferred to ICU. She was noted to have elevated troponin and subsequent TTE found reduced EF from 40-45% to 20-25% for which coronary angiography was recommended. Her hospital course was also complicated by dialysis line infection, significant bleeding around the catheter site, bradycardia and subsequent atrial fibrillation with RVR. Given the catheter site bleeding, anticoagulation was temporarily held. She has reported allergy to amiodarone, was initially placed on diltiazem for atrial fibrillation despite reduced EF. She discussed GOC at outside hospital where it appears that she lacked capacity per note and decided to pursue higher level of care at [facility]. At that time, General Surgery was planning to place a left tunneled catheter due to concerns of the right side not being suitable     Upon admit, patient appeared drowsy but conversant. Daughter reports that she typically gets lethargic 2/2 to the volume overload due to not receiving dialysis. Patient reported b/l left lower extremity pain. Critical Care Medicine was consulted for emergent trialysis line placement and dialysis.     Ms. Taylor was  admitted to MICU on 7/8/24 for urgent dialysis line placement and initiation of dialysis. Patient received short run of CRRT overnight without issue. Encephalopathy improving with continued CRRT treatments. She has had persistent AFib with RVR for which received Digoxin loading dose (renally dosed) and was restarted PO carvedilol. Patient remains in persistent AFib RVR despite therapeutic digoxin level and uptitration of carvedilol dosages so she was started on an amiodarone infusion. She was then transitioned to amio PO and started on heparin infusion for atrial fibrillation. General Surgery removed her peritoneal dialysis catheter on 7/15/24, but upon induction she vomited copious amounts of bile and was endotracheally intubated. She was extubated to nasal cannula on 7/16/24.     She has had difficulties with her tunneled HD line with sluggish flow especially through the venous side.  She would have a 4-hour instillation of tPA in each line before being able to run iHD, and even then it would be stopped before completion.  Interventional Nephrology plans to replace the line at some point next week.  Of note, Palliative Care was also consulted and patient had made her wishes known that she is a DNR code status.  She was still vacillating between replacing her tunneled HD line given her adverse event during her last procedure or going home with hospice.  The family was upset with her decision and April, her niece, asked that Palliative Care not return to see the patient.

## 2024-07-08 NOTE — PROGRESS NOTES
NAVEEN received notification pt in need of OP HD set-up. Per chart review, pt was currently pending at [facility] for PD services. NAVEEN contacted [facility] and confirmed pt's referral in pending. NAVEEN advised to call dialysis unit back this afternoon so speak with  regarding the pt's referral.    SW to contact [facility] again at 1:30PM.    Pt OP dialysis referral currently pending.      UPDATE 1:12PM: NAVEEN contacted [facility] and spoke with  Olesya. Per Olesya, pt accepted for OP HD at [facility].    Please see below for pt's outpt dialysis information:    -[facility]  -[address], [place]  [phone]  -Schedule: T/Th/S at 11:00AM  -Anticipated start date: PENDING DISCHARGE PLANS  **Pt must arrive 30 minutes early to initial appt to sign consents**    Inpt treatment team updated via secure chat.    Nakia Alvarado LMSW  [facility]  [phone]

## 2024-07-08 NOTE — PT/OT/SLP PROGRESS
Physical Therapy      Patient Name:  Juhi Taylor   MRN:  [id number]    Patient not seen today secondary to  (Pt refused and seemed lethargic. PT was not able to make second attempt to treat pt.). Will follow-up at a later date. 7/8/2024

## 2024-07-08 NOTE — NURSING
Notified team that patient is not able to swallow meds and patient HR is 120's-140's asked can metoprolol be given IV. IV metoprolol ordered.

## 2024-07-08 NOTE — CARE UPDATE
I had a long discussion with the family today. Patient unable to get tunneled dialysis line placement today due to cath lab availability and was instead postponed to tomorrow morning. I offered PD for this evening. Unfortunately there is a difference in opinions and goals of care between family members. It was ultimately decided amongst the family members that they do not want PD tonight. Additionally, they are concerned that the patient is not receiving any form of dialysis today since she did not get her tunneled line. April (Daughter and dialysis nurse) raised concern that her bicarb is decreasing each day and the patient is more confused today. April inquired about the possibility of a trialysis line today followed by a conversion to a tunneled dialysis catheter tomorrow. I explained that this is a viable option, but did explain that this would subject her to two different procedures (the trialysis line and the tunneled line). Her other daughter Nell voiced that she does not wish for her mother to undergo two procedures and wishes to wait until tomorrow to have her tunneled line placed then start HD instead of placing a trialysis line tonight. A long conversation involving family members was had, and it was agreed that we will check a VBG now, if she is severely acidotic (pH of 7.2 or less), fluid overloaded, or worsening confusion then we will move her to the ICU for trialysis line placement and HD tonight for 2 hours at Ff=391, Nm=101, and 0 UF with a R300 filter. I did explain to the family that the trialysis line will be an additional risk of infection.     If the above findings are not observed, then we will plan on her tunneled line tomorrow followed by HD.     All of the family members are in agreement with this plan. VBG is ordered.       Addendum 7/8/24 at 1823  VBG showed pH of 7.22, Discussed with interventional nephrology who feels that due to her confusion she will require anesthesia for her  tunneled line placement. Anesthesia does not have availability until Thursday. At this time, I recommend a trialysis line placement for the initiation of RRT. I discussed again with the family my plan, and the family is in agreement. Due to high HD census and nursing availability we will plan for a two hour session of SLED tonight. Given her high BUN and previous cardiac arrest on 6/26, I would like to avoid a large drop in her BUN to limit the risk of DDS, in an effort to limit this high drop in BUN I will plan on a two hour SLED session, with plans of repeating SLED or iHD in the morning. Dialysis consent was obtained and placed in the patient's chart and uploaded to media tab in her EMR.

## 2024-07-08 NOTE — TELEMEDICINE CONSULT
CC: ESRD on PD for insertion of cuffed tunneled HD cath.       HPI:  I have seen Ms. Reynaga for insertion of a cuffed tunnled HD cathater. She is known to have ESRD with HTN, DM, CAD, CHF with NICOLE. She was having leakin PD cathater insertred on 6/4/2024. She devloped Cariac arrest and was intubated. She is not getign adequate clearnace with the PD. She is moving to HD. The patiant is incoherent and she is nto responding to the verbal stimuli. The family said that her consiousness today was worst than before. I have explained the procedure and all the complications to the daughter including pain, bleeding, and infection. I have answered all the questions. She agreed and signed the consnet. The procedure in view of her mental status and consciousness should be done under anesthesia. She is booked in cardiac cath under anesthesia at the nearest spot available for anesthesia on Thursday. We would suggest stating her dialysis with a temproary line to reducce the correct her uramic encephalopathy.        Past Medical History:   Diagnosis Date    Anticoagulant long-term use     Arthritis     Breast cancer 2014    invasive lobular carcinoma    Cancer of kidney 11/2020    RIGHT KIDNEY CANCER    CHF (congestive heart failure)     Coronary artery disease dx 2005    Depression     Diabetes mellitus     Diastolic heart failure secondary to hypertension     Gout     Hyperlipemia     Hypertension     Hypertrophy of nasal turbinates     Kidney mass 2020    Right    Levoscoliosis     Lung nodule     left    Multiple thyroid nodules     NICOLE (obstructive sleep apnea)     uses C-PAP    Pulmonary hypertension        Past Surgical History:   Procedure Laterality Date    AORTOGRAPHY N/A 12/04/2020    Procedure: Aortogram;  Surgeon: Paul Pedersen MD;  Location: [facility];  Service: Cardiology;  Laterality: N/A;    BREAST SURGERY      CARDIAC CATHETERIZATION  12/2020    CHOLECYSTECTOMY      COLONOSCOPY      multi  "-last 2014     CORONARY ARTERY BYPASS GRAFT      ESOPHAGOGASTRODUODENOSCOPY      2012     HAND SURGERY Right     INSERTION OF CATHETER N/A 6/23/2024    Procedure: Insertion,catheter;  Surgeon: Meli Griffin MD;  Location: [facility] OR;  Service: Vascular;  Laterality: N/A;  ORIGINAL CATHETER WAS REPOSITIONED.  NO NEW CATHETER WAS PLACED    INSERTION, CATHETER, DIALYSIS, PERITONEAL N/A 6/4/2024    Procedure: IN Insertion Catheter, Dialysis, Peritoneal - laparoscopic;  Surgeon: Rodriguez Catalan Jr., MD;  Location: [facility] OR;  Service: General;  Laterality: N/A;    LAPAROSCOPIC ROBOT-ASSISTED SURGICAL REMOVAL OF KIDNEY USING DA CHELLE XI Right 03/10/2022    Procedure: XI ROBOTIC NEPHRECTOMY- radical;  Surgeon: Rolando Ramirez MD;  Location: [facility] OR;  Service: Urology;  Laterality: Right;    MASTECTOMY W/ SENTINEL NODE BIOPSY Bilateral 01/21/2015    bilateral "dog ears"    NASAL SINUS SURGERY  2015    Dr Bryant FESS/cauterization turbinate     PARTIAL HYSTERECTOMY  1989    PERCUTANEOUS TRANSLUMINAL BALLOON ANGIOPLASTY OF CORONARY ARTERY  12/04/2020    Procedure: Angioplasty-coronary;  Surgeon: Paul Pedersen MD;  Location: [facility] CATH;  Service: Cardiology;;    RENAL BIOPSY Right     9/20/2021 EJ    TUBAL LIGATION      ULTRASOUND GUIDANCE  12/04/2020    Procedure: Ultrasound Guidance;  Surgeon: Paul Pedersen MD;  Location: [facility] CATH;  Service: Cardiology;;       Family History   Problem Relation Name Age of Onset    Breast cancer Mother      Stroke Father Waqar Sr.     Hypertension Father Waqar Sr.     Hepatitis Brother      Asthma Daughter Nell Taylor     Birth defects Daughter Nell Camejo's two children has cleft lips    Depression Daughter Nell Taylor     Drug abuse Daughter Nell Taylor     Learning disabilities Daughter Nell Taylor     Mental illness Daughter Nell Taylor     Breast cancer Maternal Aunt      Glaucoma Sister      Drug abuse " Daughter Nell     Macular degeneration Neg Hx      Retinal detachment Neg Hx         Social History     Socioeconomic History    Marital status: Single    Number of children: 4   Occupational History    Occupation: retired Psych aid    Tobacco Use    Smoking status: Former     Current packs/day: 0.00     Types: Cigarettes     Start date: 2016     Quit date: 2016     Years since quittin.5    Smokeless tobacco: Never    Tobacco comments:     quit    Substance and Sexual Activity    Alcohol use: No    Drug use: No    Sexual activity: Not Currently     Partners: Male     Birth control/protection: None     Social Determinants of Health     Financial Resource Strain: Low Risk  (2024)    Overall Financial Resource Strain (CARDIA)     Difficulty of Paying Living Expenses: Not very hard   Recent Concern: Financial Resource Strain - Medium Risk (2024)    Overall Financial Resource Strain (CARDIA)     Difficulty of Paying Living Expenses: Somewhat hard   Food Insecurity: No Food Insecurity (2024)    Hunger Vital Sign     Worried About Running Out of Food in the Last Year: Never true     Ran Out of Food in the Last Year: Never true   Recent Concern: Food Insecurity - Food Insecurity Present (2024)    Hunger Vital Sign     Worried About Running Out of Food in the Last Year: Sometimes true     Ran Out of Food in the Last Year: Never true   Transportation Needs: No Transportation Needs (2024)    TRANSPORTATION NEEDS     Transportation : No   Physical Activity: Inactive (2024)    Exercise Vital Sign     Days of Exercise per Week: 0 days     Minutes of Exercise per Session: 0 min   Stress: Patient Unable To Answer (2024)    [place] [employer] - Occupational Stress Questionnaire     Feeling of Stress : Patient unable to answer   Housing Stability: Low Risk  (2024)    Housing Stability Vital Sign     Unable to Pay for Housing in the  Last Year: No     Homeless in the Last Year: No       Current Facility-Administered Medications   Medication Dose Route Frequency Provider Last Rate Last Admin    acetaminophen tablet 650 mg  650 mg Oral Q6H PRN Zakia Miranda DO   650 mg at 07/05/24 1550    albuterol sulfate nebulizer solution 2.5 mg  2.5 mg Nebulization Q4H PRN Zakia Miranda DO        [START ON 7/9/2024] aspirin EC tablet 81 mg  81 mg Oral Daily Zakia Miranda DO        dextromethorphan-guaiFENesin  mg/5 ml liquid 5 mL  5 mL Oral Q4H PRN Zakia Miranda, DO   5 mL at 07/03/24 1302    dextrose 10% bolus 125 mL 125 mL  12.5 g Intravenous PRN Zakia Miranda,    Stopped at 07/08/24 1640    dextrose 10% bolus 250 mL 250 mL  25 g Intravenous PRN Zakia Miranda,         famotidine tablet 20 mg  20 mg Oral Daily Rush Winkler MD   20 mg at 07/07/24 0843    gentamicin 0.1 % ointment   Topical (Top) TID Jon Hernandez MD   Given at 07/07/24 1422    glucagon (human recombinant) injection 1 mg  1 mg Intramuscular PRN Zakia Miranda DO        glucose chewable tablet 16 g  16 g Oral PRN Zakia Miranda DO        glucose chewable tablet 24 g  24 g Oral PRN Zakia Miranda DO        [START ON 7/9/2024] heparin (porcine) injection 5,000 Units  5,000 Units Subcutaneous Q8H Zakia Miranda DO        metoprolol injection 5 mg  5 mg Intravenous Q6H PRN Zakia Miranda DO   5 mg at 07/08/24 1523    metoprolol tartrate tablet 75 mg  75 mg Oral TID Herb Coreas MD   75 mg at 07/07/24 1422    naloxone 0.4 mg/mL injection 0.02 mg  0.02 mg Intravenous PRN Zakia Miranda DO        ondansetron injection 4 mg  4 mg Intravenous Q6H PRN Zakia Miranda,    4 mg at 07/05/24 0303    simethicone chewable tablet 80 mg  1 tablet Oral TID PRN Zakia Miranda,    80 mg at 07/04/24 1818    sodium chloride 0.9% flush 10 mL  10 mL Intravenous Q12H PRN Zakia Miranda DO        sodium chloride 0.9% flush 10 mL  10 mL Intravenous PRN Hawa Landa MD            [unfilled]    Review of patient's allergies indicates:   Allergen Reactions    Percocet [oxycodone-acetaminophen] Itching    Amiodarone analogues      Itching      Irbesartan Swelling    Januvia [sitagliptin]     Jardiance [empagliflozin]      Leg cramps    Lipitor [atorvastatin] Other (See Comments)     Severe leg pain    Linaclotide Other (See Comments) and Nausea And Vomiting     Does not remember    Lubiprostone Other (See Comments) and Palpitations     Does not remember        ROS       Physical Exam  Constitutional:       Appearance: She is ill-appearing.      Comments: drowsy, lethargic, not responding to verbal stimuli.   HENT:      Head: Normocephalic.        Problem List Items Addressed This Visit          Unprioritized    CKD stage 4 secondary to hypertension    * (Principal) Hypoxic respiratory failure    Overview     73F w/ ESRD hx RCC s/p R nephrectomy, HFrEF, CAD s/p PCI, paroxysmal Atrial fibrillation, T2DM obesity, and initial outside hospitalization for PD catheter malfunction and ADHF iso of volume overload complicated by PEA arrest with conversion to VT after epi and cardioversion => ROSC and recurrent AF w/ RVR.    Respiratory failure precipitated by volume overload 2/2 failed dialysis access and HFrEF complicated by PEA arrest during dialysis session at [facility]. EF following arrest 20%, previously 40%. Echo done at [facility] remains 20% with venous pressure 15mmHg. PD catheter tested by nephrology with success, currently on nightly PD. New trialysis line if volume status not adequately corrected with PD.               ACP (advance care planning)    Palliative care encounter    Severe sepsis - Primary    Acute on chronic combined systolic and diastolic heart failure    Atrial fibrillation with RVR    Current Assessment & Plan     73F w/ ESRD, HFrEF (LVEF 25%, no ICD yet), CAD s/p PCI (oLCX, mLCx, and pLAD 2020), paroxysmal Atrial fibrillation, hx RCC s/p R nephrectomy, T2DM obesity, and initial  outside hospitalization for PD catheter malfunction and ADHF iso of volume overload complicated by PEA arrest with conversion to VT after epi and cardioversion => ROSC and recurrent AF w/ RVR.    At this juncture patient relatively Given advanced HF and poor functional status, at this juncture patient is back on PD. Rates are mostly controlled on beta blocker. Given functional status, advanced HF, and low engagement do not believe benefits outweigh risks of ICD.    Recommendations:  [ ] Can increase metoprolol for rate control. Would consider switching to metoprolol succinate tomorrow for simplicity of daily dosing with metoprolol 150mg succinate or 200mg succinate given robust BP  [ ] At this time patient does not want cardioversion at all. Despite explanation of benefits for cardiac function and angiogram quality patient does not want any more interventions  [ ] recommend continuing anticoagulation given CHADS2-VASc > 2, would discharge on eliquis  [ ] EP team will sign off at this time, please let us know if patient changes her mind regarding DCCV         ESRD (end stage renal disease)    Cardiac arrest     Other Visit Diagnoses       Chest pain        Atrial fibrillation and flutter        Atrial fibrillation        PEACE (acute kidney injury)             Labs: tomorrow  PT  PTT    Impression and plan:  Hypoxic Resp. Failure. Post cardiac arrest and intubation.   ESRD on PD is shifting to HD will place cuffed tunnled HD cathater on Thursday with anesthesia. Please keep her NPO and hold Heparin on Wed. after midnight.   HTN with stabel BP.    DM II With oow sugar readings.  A fib.  CHF.  CAD post cardiac arrest and CAD with LT Cx from 2020.   NICOLE.  Morbid obesity.  Gout  Thyroid nodule.      PENNY MOLINA.Alexandro. MD. ALISSA. FACP.  , [employer] [facility] [employer]  Nephrology Consultant. [facility].   [address],  [facility]. 5th [place] [address].    email: [email or web address].  Tel: Office: [phone]

## 2024-07-08 NOTE — ASSESSMENT & PLAN NOTE
Tunneled line removed due to possible infection.  BMP reviewed- noted Estimated Creatinine Clearance: 9.7 mL/min (A) (based on SCr of 7.6 mg/dL (H)). according to latest data. Based on current GFR, CKD stage is stage 5 - GFR < 15.  Monitor UOP and serial BMP and adjust therapy as needed. Renally dose meds. Avoid nephrotoxic medications and procedures.    Additional interventions pending [facility] discussion with palliative care, pt, and family.     -new HD line, awaiting schedule to placement  -hep B core antibody  -nightly PD per nephrology  -interventional nephrology consulted   -d/c lasix  -fluid restriction 1.5L  -strict I/Os

## 2024-07-08 NOTE — ASSESSMENT & PLAN NOTE
Pt previously on PD. Now unable to remove enough volume with PD catheter.     --Nephrology following, appreciate recs  --Transfer to MICU for emergent trialysis placement and emergent dialysis

## 2024-07-08 NOTE — ASSESSMENT & PLAN NOTE
74-year-old female with DMT2, ESRD on HD, pAF, CAD s/p PCI and HFrEF is admitted as a transfer for acute HFrEF in setting of CKD5 and recent PEA arrest during dialysis. Recent PD catheter placement on 6/4 but concerned for a possibly leak from cath. Unable to initiate dialysis for 2 weeks PTA to OSH due to insurance issues. Hospital course complicated by infected tunneled cath removed on 6/30. Transferred here for higher level of care. Initial plan at OSH was to replace tunneled catheter on the left side due to concerns to that right side may not by suitable. Peritoneal fluid studies negative for peritonitis. PD was initiated here in hospital however we were unable to remove significant amounts of fluid, palliative team was consulted. At this time the daughter wishes to proceed with HD.     Plan:  - After d/w family this am, they wish to proceed w HD. Patient will need tunnel catheter today.   - Trend RFP; replete electrolytes PRN for goal K > 4, Phos > 3, Mg > 2  - Strict I&Os  - Avoid nephrotoxic agents  - Renally adjust medications

## 2024-07-08 NOTE — PT/OT/SLP PROGRESS
Occupational Therapy      Patient Name:  Juhi Taylor   MRN:  [id number]    Patient not seen today secondary to Patient with increased lethargy on 1st attempt this AM with family at bedside. Writing therapist unable to attempt this PM. Will follow-up as scheduled per OT POC.    7/8/2024

## 2024-07-08 NOTE — PROGRESS NOTES
"Arrived at patient's room to start CCP[facility]. Per patient's daughter at bedside someone told her "we're cancelling dialysis tonight since we're not able to take anything off the patient last night" Dr. Hernandez of nephrology informed regarding daughter's concern. Per Dr. Hernandez okay not to do PD tonight if family not agreeable. Patient's daughter informed regarding this and agreed not to do PD tonight. Report given to primary nurse.   "

## 2024-07-08 NOTE — ASSESSMENT & PLAN NOTE
Patient with Hypoxic Respiratory failure which is Acute on chronic.  she is not on home oxygen. Supplemental oxygen was provided and noted-      .   Signs/symptoms of respiratory failure include- increased work of breathing and respiratory distress. Contributing diagnoses includes - Pleural effusion Labs and images were reviewed. Patient Has recent ABG, which has been reviewed. Will treat underlying causes and adjust management of respiratory failure as follows-

## 2024-07-08 NOTE — SUBJECTIVE & OBJECTIVE
Interval History: Nursing notes pt family declined PD overnight. This morning daughter, Nell, notes she slept well overnight without episode of restlessness or agitation. Confirms her and her siblings have come to an agreement to attempt timed trial of HD with new line placement.     Review of Systems   Reason unable to perform ROS: pt not able to resond appropriately to questioning.     Objective:     Vital Signs (Most Recent):  Temp: 97.4 °F (36.3 °C) (07/08/24 0826)  Pulse: (!) 140 (07/08/24 0826)  Resp: 20 (07/08/24 0826)  BP: (!) 153/58 (07/08/24 0826)  SpO2: 96 % (07/08/24 0826) Vital Signs (24h Range):  Temp:  [97.4 °F (36.3 °C)-98.7 °F (37.1 °C)] 97.4 °F (36.3 °C)  Pulse:  [] 140  Resp:  [16-20] 20  SpO2:  [93 %-100 %] 96 %  BP: (107-153)/(58-93) 153/58     Weight: (!) 137 kg (302 lb 0.5 oz)  Body mass index is 47.3 kg/m².    Intake/Output Summary (Last 24 hours) at 7/8/2024 0834  Last data filed at 7/8/2024 0524  Gross per 24 hour   Intake --   Output 0 ml   Net 0 ml         Physical Exam  Vitals and nursing note reviewed.   Constitutional:       General: She is not in acute distress.     Appearance: She is obese. She is ill-appearing (restless in bed).   Cardiovascular:      Rate and Rhythm: Tachycardia present. Rhythm irregular.      Heart sounds: No murmur heard.  Pulmonary:      Effort: No respiratory distress.      Breath sounds: Examination of the right-middle field reveals rales. Examination of the left-middle field reveals rales. Examination of the right-lower field reveals rales. Examination of the left-lower field reveals rales. Decreased breath sounds and rales present.   Abdominal:      Palpations: Abdomen is soft.   Musculoskeletal:      Right lower leg: Edema present.      Left lower leg: Edema present.   Skin:     General: Skin is warm and dry.   Neurological:      General: No focal deficit present.      Mental Status: She is lethargic and confused.      Motor: Weakness present.              Significant Labs: All pertinent labs within the past 24 hours have been reviewed.  CBC:   Recent Labs   Lab 07/07/24  0601   WBC 10.10   HGB 11.4*   HCT 38.0        CMP:   Recent Labs   Lab 07/07/24  0601      K 4.2      CO2 16*   *   BUN 90*   CREATININE 7.6*   CALCIUM 9.5   PROT 6.3   ALBUMIN 2.8*   BILITOT 1.1*   ALKPHOS 100   AST 23   ALT 18   ANIONGAP 18*       Significant Imaging: I have reviewed all pertinent imaging results/findings within the past 24 hours.

## 2024-07-08 NOTE — PROGRESS NOTES
"[facility] - Cardiology [facility] Medicine  Progress Note    Patient Name: Juhi Taylor  MRN: [id number]  Patient Class: IP- Inpatient   Admission Date: 7/3/2024  Length of Stay: 5 days  Attending Physician: Herb Coreas MD  Primary Care Provider: Tony Sadler MD        Subjective:     Principal Problem:Hypoxic respiratory failure        HPI:  Ms. Taylor is a 75yo female with a PMHx of CAD, ESRD on PD, HFrEF of 40%, and afib on eliquis who was [place] [facility] [place] [facility] for a higher level of care. She originally presented to [facility] ED on 6/18 for worsening SOB and was found to be satting 89% on RA determined to be due to a heart failure exacerbation.  She was given lasix gtt and metolazone but still produced insufficient urine. HD was started, but during a dialysis session on 6/24 she suffered PEA cardiac arrest, epi was administered and she converted to V tach. After cardioversion ROSC was achieved and she was intubated and transferred to the ICU. Repeat Echo revealed an EF of 20-25% from 40% on admission but cardiology did not intervene at this time to due to her clinical condition but recommended later evaluation for ICD and angiogram. Her condition improved with further HD. She was extubated and moved to the floor but developed an uptrending leukocytosis possibly originating from a left trialysis catheter with bruising and possible purulent discharge. The line was removed. She was determined to not be a candidate for hospice. The family requested [place] [facility] [place] of care. On admission she was comfortable but minimal further history could be obtained due to patient's frustration. See  note below:       "Ms. Juhi Taylor is a 74 y.o. female with afib on Eliquis, CAD, ESRD on PD, CHF EF40%, and morbid obesity who initailly presented to the [facility] ED on 6/18 for evaluation of shortness of breath.  She had a PD catheter inserted at the beginning of June to " start PD, but because of insurance issues, her PD got delayed.  Since that time, she developed worsening SOB and weight gain.  Because her symptoms continued to worsen, she went to the ER.     Upon arrival to the ER, she was found to be satting 89% on room air.  Labs showed WBC 10, Hgb 12.5, Creatinine 4.1 and BNP 1,726.  She was started on a Lasix gtt and Nephro was consulted.  She failed to have adequate urine output despite Lasix gtt and Metolazone, so she was started on HD.  On 6/24, she got PEA cardiac arrest during dialysis.  After giving Epinephrine, the rhythm appeared to change to ventricular tachycardia.  She was cardioverted and achieved ROSC.  She was intubated and transferred to ICU on an Amiodarone gtt, which was stopped because of patient intolerance.  She was found to have a significant Troponin and Cardiology was consulted.  Echo showed a drop in EF to 20-25% from 40% prior to admission.  Cardiology decided against acute intervention because of her clinical condition, but suggested an ICD and angiogram once improved.  HD was continued and and she was able to be extubated and SD to the floor.  She was noted to have an uptrending leukocytosis. Left trailysis cathter had bruising around and possible purulent collection and the line was removed. Blood culture and tip culture have been NGTD.  She is currently on Cefepime and Vancomycin.   She then developed AFib with RVR.  She was started on Sotalol by Cardiology, which patient then refused to take once reading the ADRs.  She was then started on a Dilt gtt (with her reduced EF) and Metoprolol.  She has been seen by Palliative Care, and they are not ready for hospice.  Patient and family are not happy with the care at [facility].  They are [place] [facility] [place].  She will [place] [facility] [place] EP, Cardiology, Nephrology and IR evaluations.     Consult EP for afib with RVR/ICD eval  Consult Cardiology for angiogram  Consult Nephro for HD  Consult  "IR/Interventional Nephro for HD line placement"          Overview/Hospital Course:  Diltiazem gtt discontinued due to reduced EF and metoprolol continued. She was initiated on a heparin drip for thromboembolism prophylaxis. PD catheter originally placed 6/4 still present. Nephrology, cardiology, and electrophysiology consulted.     Nephrology opted to trial dialysis using the PD catheter to assess function and concern for catheter leakage before placing new trialysis line. Patient underwent successful trial of PD with net -800mL. Plan to continue PD nightly and assess volume status. IV lasix added after pt reporting mild/moderate shortness of breath at rest and remaining volume overloaded.    EP was going to take patient for MILLY DCCV on 7/5 however the patient refused and stated she did not want any further interventions because believed it would not help her situation. EP discussed with the patient and family that having rate control would improve the imaging of an ischemic work-up. Knowing this the patient continued to refuse. Her heparin drip was discontinued and Eliquis restarted. Palliative consulted, ongoing discussion with family.     7/6 pt is refusing AM medications and stating that she is ready to die. All three children present in room with ongoing discussions regarding next steps. Palliative care assisting. Night of 7/6, overnight MD paged about increasing agitation, gave IM zyprexa. The following morning she is intermittently responding to questions with yes/no and cannot orient self to time, place, person or situation.     Following discussion, family has decided they would like to proceed with a timed trial of HD via insertion of a new tunneled catheter. Interventional nephrology consulted. Awaiting procedure to be scheduled.     Interval History: Nursing notes pt family declined PD overnight. This morning daughter, Nell, notes she slept well overnight without episode of restlessness or agitation. " Confirms her and her siblings have come to an agreement to attempt timed trial of HD with new line placement.     Review of Systems   Reason unable to perform ROS: pt not able to resond appropriately to questioning.     Objective:     Vital Signs (Most Recent):  Temp: 97.4 °F (36.3 °C) (07/08/24 0826)  Pulse: (!) 140 (07/08/24 0826)  Resp: 20 (07/08/24 0826)  BP: (!) 153/58 (07/08/24 0826)  SpO2: 96 % (07/08/24 0826) Vital Signs (24h Range):  Temp:  [97.4 °F (36.3 °C)-98.7 °F (37.1 °C)] 97.4 °F (36.3 °C)  Pulse:  [] 140  Resp:  [16-20] 20  SpO2:  [93 %-100 %] 96 %  BP: (107-153)/(58-93) 153/58     Weight: (!) 137 kg (302 lb 0.5 oz)  Body mass index is 47.3 kg/m².    Intake/Output Summary (Last 24 hours) at 7/8/2024 0834  Last data filed at 7/8/2024 0524  Gross per 24 hour   Intake --   Output 0 ml   Net 0 ml         Physical Exam  Vitals and nursing note reviewed.   Constitutional:       General: She is not in acute distress.     Appearance: She is obese. She is ill-appearing (restless in bed).   Cardiovascular:      Rate and Rhythm: Tachycardia present. Rhythm irregular.      Heart sounds: No murmur heard.  Pulmonary:      Effort: No respiratory distress.      Breath sounds: Examination of the right-middle field reveals rales. Examination of the left-middle field reveals rales. Examination of the right-lower field reveals rales. Examination of the left-lower field reveals rales. Decreased breath sounds and rales present.   Abdominal:      Palpations: Abdomen is soft.   Musculoskeletal:      Right lower leg: Edema present.      Left lower leg: Edema present.   Skin:     General: Skin is warm and dry.   Neurological:      General: No focal deficit present.      Mental Status: She is lethargic and confused.      Motor: Weakness present.             Significant Labs: All pertinent labs within the past 24 hours have been reviewed.  CBC:   Recent Labs   Lab 07/07/24  0601   WBC 10.10   HGB 11.4*   HCT 38.0   PLT  337     CMP:   Recent Labs   Lab 07/07/24  0601      K 4.2      CO2 16*   *   BUN 90*   CREATININE 7.6*   CALCIUM 9.5   PROT 6.3   ALBUMIN 2.8*   BILITOT 1.1*   ALKPHOS 100   AST 23   ALT 18   ANIONGAP 18*       Significant Imaging: I have reviewed all pertinent imaging results/findings within the past 24 hours.    Assessment/Plan:      * Hypoxic respiratory failure  Patient with Hypoxic Respiratory failure which is Acute on chronic.  she is not on home oxygen. Supplemental oxygen was provided and noted- 99% 2LNC     .   Signs/symptoms of respiratory failure include- increased work of breathing and lethargy. Contributing diagnoses includes - CHF, Obesity Hypoventilation, and ESRD  Labs and images were reviewed. Patient Has not had a recent ABG. Will treat underlying causes and adjust management of respiratory failure as follows-     Respiratory failure likely multifactorial that includes ESRD s/p RCC w/ nephrectomy, HFrEF with significant reduction in EF following PEA arrest at OSH, obesity hypoventilation, and concern for possible uremia 2/2 inadequate PD clearance.     -Further invention pending family meeting with Palliative care    - BiPAP nightly if agreeable  - New HD line plan for tomorrow    CKD stage 4 secondary to hypertension  Tunneled line removed due to possible infection.  BMP reviewed- noted Estimated Creatinine Clearance: 9.7 mL/min (A) (based on SCr of 7.6 mg/dL (H)). according to latest data. Based on current GFR, CKD stage is stage 5 - GFR < 15.  Monitor UOP and serial BMP and adjust therapy as needed. Renally dose meds. Avoid nephrotoxic medications and procedures.    Additional interventions pending [facility] discussion with palliative care, pt, and family.     -new HD line, awaiting schedule to placement  -hep B core antibody  -nightly PD per nephrology  -interventional nephrology consulted   -d/c lasix  -fluid restriction 1.5L  -strict I/Os      Acute on chronic combined systolic  "and diastolic heart failure  Prior to admission to [facility], Echo 6/19/24 w/ EF 40-45% w/ venous pressure 15mmHg. Following PEA arrest repeat echo 6/24 EF 20-25% with C echo confirming reduced EF. At [facility] noted "Patient had significant elevation of troponin cardiology consulted. No acute intervention recommended because of clinical condition and later patient is not keen to do the angiogram was stress test."  Cardiology had planned for DCCV w/ MILLY but patient refused intervention.     -Cardiology consulted, want DCCV followed by ischemic work-up. Patient so far declining this intervention.  -Stop diuresis w/ 200mg IV lasix q12hr, little to no urine production   -Metoprolol 75mg TID, plan to transition 200mg succinate daily as tolerated            Atrial fibrillation with RVR  Patient with Paroxysmal (<7 days) atrial fibrillation which is poorly controlled currently with  metoprolol . Patient is currently in sinus rhythm.[id number] Score: 4. Pt did not tolerate amiodarone. Noted declined sotalol. Diltiazem d/cd with cardiac hx.     -cards & EP following, appreciate recs  -d/c eliquis in anticipation of new line  -sq heparin started  -metoprolol for rate control, titrating as tolerated      Severe sepsis  Presented with concerns of line infection. Cultures negative. Vitals stable.  - Discontinue cefepime    Severe obesity (BMI >= 40)  Body mass index is 47.3 kg/m². Morbid obesity complicates all aspects of disease management from diagnostic modalities to treatment. Weight loss encouraged and health benefits explained to patient.         Type 2 diabetes mellitus with microalbuminuria, without long-term current use of insulin  Last HgbA1c 6.8. Hold hypoglycemic medications while admitted.    Q4h glucose checks  Consider SSI as needed      Essential hypertension  Chronic, controlled. Latest blood pressure and vitals reviewed-     Temp:  [96.4 °F (35.8 °C)-98.5 °F (36.9 °C)]   Pulse:  []   Resp:  [18-22]   BP: " (111-163)/()   SpO2:  [92 %-99 %] .   Home meds for hypertension were reviewed and noted below.   Hypertension Medications               amLODIPine (NORVASC) 10 MG tablet Take 10 mg by mouth once daily.    carvediloL (COREG) 25 MG tablet Take 1 tablet (25 mg total) by mouth 2 (two) times daily.    cloNIDine (CATAPRES) 0.1 MG tablet Take 1 tablet (0.1 mg total) by mouth 3 (three) times daily as needed (PRN SBP > 165 mmHg).    cloNIDine 0.1 mg/24 hr td ptwk (CATAPRES) 0.1 mg/24 hr Place 1 patch onto the skin every 7 days.    furosemide (LASIX) 40 MG tablet Take 40 mg by mouth once daily.    nitroGLYCERIN (NITROSTAT) 0.4 MG SL tablet Place 1 tablet (0.4 mg total) under the tongue every 5 (five) minutes as needed for Chest pain.            While in the hospital, will manage blood pressure as follows; Adjust home antihypertensive regimen as follows- only metoprolol tartrate for now. Observe and resume home meds as needed.    Will utilize p.r.n. blood pressure medication only if patient's blood pressure greater than 180/110 and she develops symptoms such as worsening chest pain or shortness of breath.      VTE Risk Mitigation (From admission, onward)           Ordered     heparin (porcine) injection 5,000 Units  Every 8 hours         07/07/24 1335     Place sequential compression device  Until discontinued         07/03/24 0450                    Discharge Planning   HARINI: 7/8/2024     Code Status: Full Code   Is the patient medically ready for discharge?:     Reason for patient still in hospital (select all that apply): Patient unstable, Patient trending condition, Treatment, and Consult recommendations  Discharge Plan A: Rehab            Grant Jean MD  Department of Hospital Medicine   [facility] - Cardiology Stepdown

## 2024-07-08 NOTE — ASSESSMENT & PLAN NOTE
- Palliative care consulted, appreciate their assistance in clarification of GOC/POC with patient and family

## 2024-07-08 NOTE — PROGRESS NOTES
[facility] - Cardiology Stepdown  Nephrology  Progress Note    Patient Name: Juhi Taylor  MRN: [id number]  Admission Date: 7/3/2024  Hospital Length of Stay: 5 days  Attending Provider: Herb Coreas MD   Primary Care Physician: Tony Sadler MD  Principal Problem:Hypoxic respiratory failure    Subjective:     HPI: 74-year-old female with prior history of CKD stage 5 with recent PD catheter placement on 6/4, RCC s/p right nephrectomy, T2DM, obesity, CAD s/p PCI to Lcx and LAD in 11/2020 and HFmREF who is admitted as a transfer from [facility] for higher level of care and further cardiac evaluation. Nephrology consulted for hemodialysis.     She initially presented on 6/18/24 to [facility] for hypervolemia in the setting of CKD 4-5 for which she had undergone elective PD catheter placement complicated by catheter site leaking. She had a complex hospital course, was initially placed on furosemide gtt without significant improvement and later underwent placement of tunneled line for HD with volume removal. During her second dialysis session she went into PEA arrest for which she was resuscitated, intubated and transferred to ICU. She was noted to have elevated troponin and subsequent TTE found reduced EF from 40-45% to 20-25% for which coronary angiography was recommended. Her hospital course was also complicated by dialysis line infection, significant bleeding around the catheter site, bradycardia and subsequent atrial fibrillation with RVR. Given the catheter site bleeding, anticoagulation was temporarily held. She has reported allergy to amiodarone, was initially placed on diltiazem for atrial fibrillation despite reduced EF. She discussed GOC at outside hospital where it appears that she lacked capacity per note and decided to pursue higher level of care at [facility]. At that time, gen surgery was planning to place a left tunneled catheter due to concerns of the right side not being suitable    Upon admit, patient  appeared drowsy but conversant. Daughter reports that she typically gets lethargic 2/2 to the volume overload due to not receiving dialysis. Patient reported b/l left lower extremity pain. Daughter concerned about her not having dialysis in 4 days and wants a session today.     Interval History: Patient herself is lethargic, mumbles words, difficult to understand. Daughter at bedside says that they would like to initiate HD.     Review of patient's allergies indicates:   Allergen Reactions    Percocet [oxycodone-acetaminophen] Itching    Amiodarone analogues      Itching      Irbesartan Swelling    Januvia [sitagliptin]     Jardiance [empagliflozin]      Leg cramps    Lipitor [atorvastatin] Other (See Comments)     Severe leg pain    Linaclotide Other (See Comments) and Nausea And Vomiting     Does not remember    Lubiprostone Other (See Comments) and Palpitations     Does not remember     Current Facility-Administered Medications   Medication Frequency    acetaminophen tablet 650 mg Q6H PRN    albuterol sulfate nebulizer solution 2.5 mg Q4H PRN    [START ON 7/9/2024] aspirin EC tablet 81 mg Daily    dextromethorphan-guaiFENesin  mg/5 ml liquid 5 mL Q4H PRN    dextrose 10% bolus 125 mL 125 mL PRN    dextrose 10% bolus 250 mL 250 mL PRN    famotidine tablet 20 mg Daily    gentamicin 0.1 % ointment TID    glucagon (human recombinant) injection 1 mg PRN    glucose chewable tablet 16 g PRN    glucose chewable tablet 24 g PRN    [START ON 7/9/2024] heparin (porcine) injection 5,000 Units Q8H    metoprolol tartrate tablet 75 mg TID    naloxone 0.4 mg/mL injection 0.02 mg PRN    ondansetron injection 4 mg Q6H PRN    simethicone chewable tablet 80 mg TID PRN    sodium chloride 0.9% flush 10 mL Q12H PRN       Objective:     Vital Signs (Most Recent):  Temp: 98.7 °F (37.1 °C) (07/08/24 0443)  Pulse: (!) 136 (07/08/24 0443)  Resp: 16 (07/08/24 0443)  BP: 110/71 (07/08/24 0443)  SpO2: 97 % (07/08/24 0443) Vital Signs (24h  Range):  Temp:  [97.7 °F (36.5 °C)-98.7 °F (37.1 °C)] 98.7 °F (37.1 °C)  Pulse:  [] 136  Resp:  [16-20] 16  SpO2:  [93 %-100 %] 97 %  BP: (107-126)/(71-93) 110/71     Weight: (!) 137 kg (302 lb 0.5 oz) (07/03/24 1422)  Body mass index is 47.3 kg/m².  Body surface area is 2.54 meters squared.    I/O last 3 completed shifts:  In: -   Out: 244 [Other:244]     Physical Exam  Constitutional:       General: She is not in acute distress.     Appearance: Normal appearance. She is obese.   HENT:      Head: Normocephalic and atraumatic.      Mouth/Throat:      Mouth: Mucous membranes are moist.      Pharynx: Oropharynx is clear.   Cardiovascular:      Rate and Rhythm: Normal rate and regular rhythm.      Heart sounds: Normal heart sounds.   Pulmonary:      Effort: Pulmonary effort is normal.      Breath sounds: Normal breath sounds.   Abdominal:      Palpations: Abdomen is soft.   Musculoskeletal:      Right lower leg: Edema (2+) present.      Left lower leg: Edema (2+) present.   Skin:     General: Skin is warm and dry.   Neurological:      General: No focal deficit present.      Mental Status: She is alert and oriented to person, place, and time.          Significant Labs:  All labs within the past 24 hours have been reviewed.     Significant Imaging:  All imaging with the past 24 hours have been reviewed.  Assessment/Plan:     Renal/  CKD stage 4 secondary to hypertension  74-year-old female with DMT2, ESRD on HD, pAF, CAD s/p PCI and HFrEF is admitted as a transfer for acute HFrEF in setting of CKD5 and recent PEA arrest during dialysis. Recent PD catheter placement on 6/4 but concerned for a possibly leak from cath. Unable to initiate dialysis for 2 weeks PTA to OSH due to insurance issues. Hospital course complicated by infected tunneled cath removed on 6/30. Transferred here for higher level of care. Initial plan at OSH was to replace tunneled catheter on the left side due to concerns to that right side may not  by suitable. Peritoneal fluid studies negative for peritonitis. PD was initiated here in hospital however we were unable to remove significant amounts of fluid, palliative team was consulted. At this time the daughter wishes to proceed with HD.     Plan:  - After d/w family this am, they wish to proceed w HD, patient will need tunnel cath today and then HD afterwards.   - Trend RFP; replete electrolytes PRN for goal K > 4, Phos > 3, Mg > 2  - Strict I&Os  - Avoid nephrotoxic agents  - Renally adjust medications        Thank you for your consult. I will follow-up with patient. Please contact us if you have any additional questions.    Paulo Alicea MD  Nephrology  [facility] - Cardiology Stepdown

## 2024-07-08 NOTE — SUBJECTIVE & OBJECTIVE
Interval History: Patient herself is lethargic, mumbles words, difficult to understand. Daughter at bedside says that they would like to initiate HD.     Review of patient's allergies indicates:   Allergen Reactions    Percocet [oxycodone-acetaminophen] Itching    Amiodarone analogues      Itching      Irbesartan Swelling    Januvia [sitagliptin]     Jardiance [empagliflozin]      Leg cramps    Lipitor [atorvastatin] Other (See Comments)     Severe leg pain    Linaclotide Other (See Comments) and Nausea And Vomiting     Does not remember    Lubiprostone Other (See Comments) and Palpitations     Does not remember     Current Facility-Administered Medications   Medication Frequency    acetaminophen tablet 650 mg Q6H PRN    albuterol sulfate nebulizer solution 2.5 mg Q4H PRN    [START ON 7/9/2024] aspirin EC tablet 81 mg Daily    dextromethorphan-guaiFENesin  mg/5 ml liquid 5 mL Q4H PRN    dextrose 10% bolus 125 mL 125 mL PRN    dextrose 10% bolus 250 mL 250 mL PRN    famotidine tablet 20 mg Daily    gentamicin 0.1 % ointment TID    glucagon (human recombinant) injection 1 mg PRN    glucose chewable tablet 16 g PRN    glucose chewable tablet 24 g PRN    [START ON 7/9/2024] heparin (porcine) injection 5,000 Units Q8H    metoprolol tartrate tablet 75 mg TID    naloxone 0.4 mg/mL injection 0.02 mg PRN    ondansetron injection 4 mg Q6H PRN    simethicone chewable tablet 80 mg TID PRN    sodium chloride 0.9% flush 10 mL Q12H PRN       Objective:     Vital Signs (Most Recent):  Temp: 98.7 °F (37.1 °C) (07/08/24 0443)  Pulse: (!) 136 (07/08/24 0443)  Resp: 16 (07/08/24 0443)  BP: 110/71 (07/08/24 0443)  SpO2: 97 % (07/08/24 0443) Vital Signs (24h Range):  Temp:  [97.7 °F (36.5 °C)-98.7 °F (37.1 °C)] 98.7 °F (37.1 °C)  Pulse:  [] 136  Resp:  [16-20] 16  SpO2:  [93 %-100 %] 97 %  BP: (107-126)/(71-93) 110/71     Weight: (!) 137 kg (302 lb 0.5 oz) (07/03/24 1422)  Body mass index is 47.3 kg/m².  Body surface area is  2.54 meters squared.    I/O last 3 completed shifts:  In: -   Out: 244 [Other:244]     Physical Exam  Constitutional:       General: She is not in acute distress.     Appearance: Normal appearance. She is obese.   HENT:      Head: Normocephalic and atraumatic.      Mouth/Throat:      Mouth: Mucous membranes are moist.      Pharynx: Oropharynx is clear.   Cardiovascular:      Rate and Rhythm: Normal rate and regular rhythm.      Heart sounds: Normal heart sounds.   Pulmonary:      Effort: Pulmonary effort is normal.      Breath sounds: Normal breath sounds.   Abdominal:      Palpations: Abdomen is soft.   Musculoskeletal:      Right lower leg: Edema (2+) present.      Left lower leg: Edema (2+) present.   Skin:     General: Skin is warm and dry.   Neurological:      General: No focal deficit present.      Mental Status: She is alert and oriented to person, place, and time.          Significant Labs:  All labs within the past 24 hours have been reviewed.     Significant Imaging:  All imaging with the past 24 hours have been reviewed.

## 2024-07-08 NOTE — H&P
[facility] - Cardiology Stepdown  Critical Care Medicine  History & Physical    Patient Name: Juhi Taylor  MRN: [id number]  Admission Date: 7/3/2024  Hospital Length of Stay: 5 days  Code Status: Full Code  Attending Physician: Herb Coreas MD   Primary Care Provider: Tony Sadler MD   Principal Problem: Hypoxic respiratory failure    Subjective:     HPI:  Juhi Taylor is a 74-year-old female with a past medical history of CKD stage 5 with recent PD catheter placement on 6/4, RCC s/p right nephrectomy, T2DM, obesity, CAD s/p PCI to Lcx and LAD in 11/2020 and HFmREF who is admitted as a transfer from [facility] for higher level of care and further cardiac evaluation. Nephrology consulted for hemodialysis. She initially presented on 6/18/24 to [facility] for hypervolemia in the setting of CKD 4-5 for which she had undergone elective PD catheter placement complicated by catheter site leaking. She had a complex hospital course, was initially placed on furosemide gtt without significant improvement and later underwent placement of tunneled line for HD with volume removal. During her second dialysis session she went into PEA arrest for which she was resuscitated, intubated and transferred to ICU. She was noted to have elevated troponin and subsequent TTE found reduced EF from 40-45% to 20-25% for which coronary angiography was recommended. Her hospital course was also complicated by dialysis line infection, significant bleeding around the catheter site, bradycardia and subsequent atrial fibrillation with RVR. Given the catheter site bleeding, anticoagulation was temporarily held. She has reported allergy to amiodarone, was initially placed on diltiazem for atrial fibrillation despite reduced EF. She discussed GOC at outside hospital where it appears that she lacked capacity per note and decided to pursue higher [place] [facility] At that time, gen surgery was planning to place a left tunneled catheter  due to concerns of the right side not being suitable     Upon admit, patient appeared drowsy but conversant. Daughter reports that she typically gets lethargic 2/2 to the volume overload due to not receiving dialysis. Patient reported b/l left lower extremity pain. Daughter concerned about her not having dialysis in 4 days and wants a session today.     Pt initially refusing dialysis. Seen by nephrology. VBG 7.220/33.4/34/13.7/-14. Critical care medicine consulted for emergent trialysis line placement and dialysis.     Hospital/ICU Course:  No notes on file     Past Medical History:   Diagnosis Date    Anticoagulant long-term use     Arthritis     Breast cancer 2014    invasive lobular carcinoma    Cancer of kidney 11/2020    RIGHT KIDNEY CANCER    CHF (congestive heart failure)     Coronary artery disease dx 2005    Depression     Diabetes mellitus     Diastolic heart failure secondary to hypertension     Gout     Hyperlipemia     Hypertension     Hypertrophy of nasal turbinates     Kidney mass 2020    Right    Levoscoliosis     Lung nodule     left    Multiple thyroid nodules     NICOLE (obstructive sleep apnea)     uses C-PAP    Pulmonary hypertension     Severe sepsis 07/01/2024       Past Surgical History:   Procedure Laterality Date    AORTOGRAPHY N/A 12/04/2020    Procedure: Aortogram;  Surgeon: Paul Pedersen MD;  Location: [facility] CATH;  Service: Cardiology;  Laterality: N/A;    BREAST SURGERY      CARDIAC CATHETERIZATION  12/2020    CHOLECYSTECTOMY      COLONOSCOPY      multi -last 2014     CORONARY ARTERY BYPASS GRAFT      ESOPHAGOGASTRODUODENOSCOPY      2012     HAND SURGERY Right     INSERTION OF CATHETER N/A 6/23/2024    Procedure: Insertion,catheter;  Surgeon: Meli Griffin MD;  Location: [facility] OR;  Service: Vascular;  Laterality: N/A;  ORIGINAL CATHETER WAS REPOSITIONED.  NO NEW CATHETER WAS PLACED    INSERTION, CATHETER, DIALYSIS, PERITONEAL N/A 6/4/2024    Procedure: IN Insertion Catheter, Dialysis,  "Peritoneal - laparoscopic;  Surgeon: Rodriguez Catalan Jr., MD;  Location: [facility] OR;  Service: General;  Laterality: N/A;    LAPAROSCOPIC ROBOT-ASSISTED SURGICAL REMOVAL OF KIDNEY USING DA CHELLE XI Right 03/10/2022    Procedure: XI ROBOTIC NEPHRECTOMY- radical;  Surgeon: Rolando Ramirez MD;  Location: [facility] OR;  Service: Urology;  Laterality: Right;    MASTECTOMY W/ SENTINEL NODE BIOPSY Bilateral 2015    bilateral "dog ears"    NASAL SINUS SURGERY      Dr Bryant FESS/cauterization turbinate     PARTIAL HYSTERECTOMY      PERCUTANEOUS TRANSLUMINAL BALLOON ANGIOPLASTY OF CORONARY ARTERY  2020    Procedure: Angioplasty-coronary;  Surgeon: Paul Pedersen MD;  Location: [facility] CATH;  Service: Cardiology;;    RENAL BIOPSY Right     2021 EJ    TUBAL LIGATION      ULTRASOUND GUIDANCE  2020    Procedure: Ultrasound Guidance;  Surgeon: Paul Pedersen MD;  [facility] CATH;  Service: Cardiology;;       Review of patient's allergies indicates:   Allergen Reactions    Percocet [oxycodone-acetaminophen] Itching    Amiodarone analogues      Itching      Irbesartan Swelling    Januvia [sitagliptin]     Jardiance [empagliflozin]      Leg cramps    Lipitor [atorvastatin] Other (See Comments)     Severe leg pain    Linaclotide Other (See Comments) and Nausea And Vomiting     Does not remember    Lubiprostone Other (See Comments) and Palpitations     Does not remember       Family History       Problem Relation (Age of Onset)    Asthma Daughter    Birth defects Daughter    Breast cancer Mother, Maternal Aunt    Depression Daughter    Drug abuse Daughter, Daughter    Glaucoma Sister    Hepatitis Brother    Hypertension Father    Learning disabilities Daughter    Mental illness Daughter    Stroke Father          Tobacco Use    Smoking status: Former     Current packs/day: 0.00     Types: Cigarettes     Start date: 2016     Quit date: 2016     Years since quittin.5    Smokeless " tobacco: Never    Tobacco comments:     quit    Substance and Sexual Activity    Alcohol use: No    Drug use: No    Sexual activity: Not Currently     Partners: Male     Birth control/protection: None      Review of Systems   Unable to perform ROS: Mental status change (Altered Mental Status)     Objective:     Vital Signs (Most Recent):  Temp: 97.2 °F (36.2 °C) (07/08/24 1604)  Pulse: (!) 141 (07/08/24 1604)  Resp: 20 (07/08/24 1604)  BP: 138/83 (07/08/24 1604)  SpO2: 98 % (07/08/24 1651) Vital Signs (24h Range):  Temp:  [97.2 °F (36.2 °C)-98.7 °F (37.1 °C)] 97.2 °F (36.2 °C)  Pulse:  [109-141] 141  Resp:  [16-20] 20  SpO2:  [93 %-100 %] 98 %  BP: (107-155)/(58-93) 138/83   Weight: (!) 137 kg (302 lb 0.5 oz)  Body mass index is 47.3 kg/m².      Intake/Output Summary (Last 24 hours) at 7/8/2024 1809  Last data filed at 7/8/2024 0524  Gross per 24 hour   Intake --   Output 0 ml   Net 0 ml          Physical Exam  Vitals and nursing note reviewed.   Constitutional:       Appearance: She is obese. She is ill-appearing.   HENT:      Head: Normocephalic.      Mouth/Throat:      Mouth: Mucous membranes are moist.   Eyes:      Pupils: Pupils are equal, round, and reactive to light.   Cardiovascular:      Rate and Rhythm: Tachycardia present.   Pulmonary:      Effort: Tachypnea present. No accessory muscle usage or respiratory distress.      Breath sounds: Decreased breath sounds present.   Abdominal:      General: Bowel sounds are normal. There is no distension.      Palpations: Abdomen is soft.      Tenderness: There is no abdominal tenderness.   Musculoskeletal:      Right lower leg: Edema present.      Left lower leg: Edema present.   Skin:     General: Skin is warm.   Neurological:      Mental Status: She is lethargic, disoriented and confused.   Psychiatric:         Attention and Perception: She is inattentive.            Vents:  Oxygen Concentration (%): 28 (07/05/24 2320)  Lines/Drains/Airways       Drain   Duration             Female External Urinary Catheter w/ Suction 07/03/24 0430 5 days              Peripheral Intravenous Line  Duration                  Peripheral IV - Single Lumen 07/08/24 1023 20 G 1 3/4 in Anterior;Right Forearm <1 day                  Significant Labs:    CBC/Anemia Profile:  Recent Labs   Lab 07/07/24  0601 07/08/24  0846   WBC 10.10 12.09   HGB 11.4* 11.5*   HCT 38.0 37.6    347   MCV 86 85   RDW 20.8* 21.3*        Chemistries:  Recent Labs   Lab 07/07/24  0601 07/08/24  0846    139   K 4.2 4.8    105   CO2 16* 13*   BUN 90* 102*   CREATININE 7.6* 8.4*   CALCIUM 9.5 9.7   ALBUMIN 2.8* 2.8*   PROT 6.3 6.1   BILITOT 1.1* 1.7*   ALKPHOS 100 160*   ALT 18 56*   AST 23 97*   MG 2.4 2.5   PHOS 6.5* 7.6*       All pertinent labs within the past 24 hours have been reviewed.    Significant Imaging: I have reviewed all pertinent imaging results/findings within the past 24 hours.  Assessment/Plan:     Pulmonary  * Hypoxic respiratory failure  Patient with Hypoxic Respiratory failure which is Acute on chronic.  she is not on home oxygen. Supplemental oxygen was provided and noted-      .   Signs/symptoms of respiratory failure include- increased work of breathing and respiratory distress. Contributing diagnoses includes - Pleural effusion Labs and images were reviewed. Patient Has recent ABG, which has been reviewed. Will treat underlying causes and adjust management of respiratory failure as follows-     Cardiac/Vascular  Atrial fibrillation with RVR  --Continue Metoprolol 75 mg TID  --Daily CMP  --Keep K>4, Mag >2    Acute on chronic combined systolic and diastolic heart failure  Echo    Left Ventricle: The left ventricle is moderately dilated. Normal wall thickness. Global hypokinesis present. There is severely reduced systolic function with a visually estimated ejection fraction of 20 - 25%.    Right Ventricle: Mild right ventricular enlargement. Wall thickness is normal. Right  ventricle wall motion has global hypokinesis. Systolic function is moderately reduced.    Left Atrium: Left atrium is severely dilated.    Right Atrium: Right atrium is severely dilated.    Aortic Valve: The aortic valve is a trileaflet valve. There is mild aortic valve sclerosis.    Mitral Valve: There is mild regurgitation.    Tricuspid Valve: There is severe regurgitation.    Pulmonary Artery: There is moderate pulmonary hypertension. The estimated pulmonary artery systolic pressure is 61 mmHg.    IVC/SVC: Elevated venous pressure at 15 mmHg.    Pericardium: There is a trivial circumferential effusion. No indication of cardiac tamponade.        Essential hypertension  - Home anti-HTN regiment on hold, normtensive on admission to ICU    Renal/  CKD stage 4 secondary to hypertension  Pt previously on PD. Now unable to remove enough volume with PD catheter.     --Nephrology following, appreciate recs  --Transfer to MICU for emergent trialysis placement and emergent dialysis    Endocrine  Type 2 diabetes mellitus with microalbuminuria, without long-term current use of insulin  --POC glucose  --Hypoglycemia protocol    Palliative Care  Palliative care encounter  --Palliative care following, continued goals of care with family    ACP (advance care planning)  - Palliative care consulted, appreciate their assistance in clarification of GOC/POC with patient and family         Critical Care Time: 60 minutes  Critical secondary to Patient has a condition that poses threat to life and bodily function: Acute Renal Failure    Critical care was time spent personally by me on the following activities: development of treatment plan with patient or surrogate and bedside caregivers, discussions with consultants, evaluation of patient's response to treatment, examination of patient, ordering and performing treatments and interventions, ordering and review of laboratory studies, ordering and review of radiographic studies, pulse  oximetry, re-evaluation of patient's condition. This critical care time did not overlap with that of any other provider or involve time for any procedures.     Desiree Linder-Heather, NIXON  Critical Care Medicine  [facility] - Cardiology Stepdown

## 2024-07-08 NOTE — CLINICAL REVIEW
"RAPID RESPONSE NURSE CHART REVIEW        Chart Reviewed: 07/08/2024, 7:40 AM    MRN: [id number]  Bed: 330/330 A    Dx: Hypoxic respiratory failure    Juhi Taylor has a past medical history of Anticoagulant long-term use, Arthritis, Breast cancer, Cancer of kidney, CHF (congestive heart failure), Coronary artery disease, Depression, Diabetes mellitus, Diastolic heart failure secondary to hypertension, Gout, Hyperlipemia, Hypertension, Hypertrophy of nasal turbinates, Kidney mass, Levoscoliosis, Lung nodule, Multiple thyroid nodules, NICOLE (obstructive sleep apnea), and Pulmonary hypertension.    Last VS: /71 (BP Location: Right arm, Patient Position: Lying)   Pulse (!) 136   Temp 98.7 °F (37.1 °C) (Oral)   Resp 16   Ht 5' 7" (1.702 m)   Wt (!) 137 kg (302 lb 0.5 oz)   SpO2 97%   Breastfeeding No   BMI 47.30 kg/m²     24H Vital Sign Range:  Temp:  [97.7 °F (36.5 °C)-98.7 °F (37.1 °C)]   Pulse:  []   Resp:  [16-20]   BP: (107-126)/(71-93)   SpO2:  [93 %-100 %]     Level of Consciousness (AVPU): alert    Recent Labs     07/06/24  0721 07/07/24  0601   WBC 8.72 10.10   HGB 11.5* 11.4*   HCT 39.4 38.0    337       Recent Labs     07/06/24  0721 07/07/24  0601    138   K 4.4 4.2    104   CO2 16* 16*   BUN 92* 90*   CREATININE 6.9* 7.6*   * 123*   PHOS 6.6* 6.5*   MG 2.5 2.4        OXYGEN:  Flow (L/min) (Oxygen Therapy): 2  Oxygen Concentration (%): 28       MEWS score: 3    Rounding completed w/ Charge NEENA Ricks and CSU PABLO. Discussed continued irregular cardiac rhythm w/ HR in the 130s. Vital signs otherwise stable. Plan for continued GOC discussions today.  No additional concerns verbalized at this time. Instructed to call [phone] for further concerns or assistance.    Berenice Gordillo RN       "

## 2024-07-08 NOTE — PROGRESS NOTES
" Palliative Medicine  Consult Note       Patient Name: Juhi Taylor   MRN: [id number]   Admission Date: 7/3/2024   Hospital Length of Stay: 5   Attending Provider: Herb Coreas MD   Consulting Provider: SEAMUS Gautam  Primary Care Physician: Tony Sadler MD   Principal Problem: Hypoxic respiratory failure     Patient information was obtained from relative(s), past medical records, and ER records.           Assessment/Plan:    CHF with complicated cardiac history.  Patient does not want further intervention and wants to go home. Youngest daughter wants to continue treatments.  Will need sequential treatments in efforts to be "better" but no one is convinced that she has the functional status to get "better".  ESRD does not want HD catheter placed. Will ask Nephro if PD can be restarted.    Palliative Care Encounter:  Impression:  Elderly woman with multiple medical problems including end-stage renal disease, worsening heart failure as well as significant dysrhythmias.  Patient says that she wants to go home however it is unclear if she understands that going home means that she will have a limited life span.  In her current date I am not sure she has a candidate for either peritoneal or hemodialysis.    Palliative care consulted for goals of care and decision making.       Advance Care Planning   Advance Directives:   Living Will: No    Do Not Resuscitate Status: No    Medical Power of : No      Decision Making:  Family answered questions and Patient unable to communicate due to disease severity/cognitive impairment  Goals of Care: What is most important right now is to focus on extending life as long as possible, even it it means sacrificing quality. Accordingly, we have decided that the best plan to meet the patient's goals includes continuing with treatment.    7/8/24: Met with daughterNell, at  this am. She shares that she and her siblings have decided to go forward with " "tunneled catheter placement and HD "trial". This morning, pt is turning around in bed and appears agitated, without able to participate in conversation.  -Inquired with Francy what "successful" dialysis would look like to her, she shares it would be her mother talking, eating, and sharing for herself what she would want going forward. She is unsure how long the "trial" will be/should be.   -Francy feels her mother would want to "at least try" b/c she was willing to get the PD catheter several weeks ago.   -Attempted to continue discussion regarding  other aspects of her mother's declining health (CHF, ef 25%, etc). She attributes this to her renal function and feels HD may improve overall health. Discussed if ICU would be appropriate GOC if mother's BP did not tolerate HD for any reason. She asked why her mother was not in ICU now if she is "so sick". Redirected to mother's lack of current needs for ICU level of care, but that does not mean she is "not sick" with multiple concerning health issues.  -Francy is frustrated that we [[facility]] keep talking to her and her family. She does not wish to keep talking about what happens if HD does not "work" or what that may mean.   -Pt scheduled for tunneled cath today and supposed to get HD this evening.          - Prior experience with serious illness: yes  -The patient has previously engaged in advance care planning or GOC discussions  - Insight/Understanding of illness: difficult decision making between family members.    Patient does not have a healthcare power of  but she has 3 of 4 living children.  Son Dawood and daughter Maria Luisa are in agreement however daughter francy is not willing to participate in the conversation.  But no decisions have been made    Life Limiting Diagnosis:  ESHD and ESRD  -Prognosis-Time and potential for recovery: poor  -Functional status: poor.  Pt was not able to manage ADLs  -Dementia diagnosis no      Symptom Management:  -Pain: " no      -Dyspnea no      -Anxiety/Depression no      -Constipation: no      -Anorexia:yes      Summary of recommendations and follow up plan:  -Most important goals at this time: Further discussion about care plans and what might be beneficial   -Code status: Full Code but did make a recommendation that her status change to allow natural death.  -Disposition: continue current level of care.      The above recommendations communicated directly to primary team on 07/08/2024    Thank you for your consult. I will follow-up with patient. Please contact us if you have any additional questions.       Subjective:     Chief Complaint: No chief complaint on file.        HPI:   Ms. Taylor is a 75yo female with a PMHx of CAD, ESRD on PD, HFrEF of 40%, and afib on eliquis who was transferred to [facility] from [facility] for a higher level of care. She originally presented to [facility] ED on 6/18 for worsening SOB and was found to be satting 89% on RA determined to be due to a heart failure exacerbation. She was given lasix gtt and metolazone but still produced insufficient urine. HD was started, but during a dialysis session on 6/24 she suffered PEA cardiac arrest, epi was administered and she converted to V tach. After cardioversion ROSC was achieved and she was intubated and transferred to the ICU. Repeat Echo revealed an EF of 20-25% from 40% on admission but cardiology did not intervene at this time to due to her clinical condition but recommended later evaluation for ICD and angiogram. Her condition improved with further HD. She was extubated and moved to the floor but developed an uptrending leukocytosis possibly originating from a left trialysis catheter with bruising and possible purulent discharge. The line was removed. She was determined to not be a candidate for hospice. The family requested transfer to [facility] for a higher level of care.       Hospital Course:  Since arrival to [facility], patient has refused interventions.  This morning in  fact she refused taking her medications.  I was asked to see her to help delineate goals of care and further treatments.  Her son and oldest daughter Maria Luisa feel that the patient is nearing the end of her life.  Other daughter farncy does not want to hear anything about this.  She also does not think that her mother is capable of making any decisions.  Patient was pretty clear about going home and yesterday apparently understood the ramifications of stopping all of her current treatments.    Review of Symptoms      Symptom Assessment (ESAS 0-10 Scale)  Unable to complete assessment due to Mental status change     CAM / Delirium:  Negative  Constipation:  Negative  Diarrhea:  Negative      Bowel Management Plan (BMP):  Yes      Pain Assessment:  OME in 24 hours:  0  Location(s):      Pain Assessment in Advanced Demential Scale (PAINAD)   Breathing - Independent of vocalization:  0  Negative vocalization:  0  Facial expression:  0  Body language:  0  Consolability:  0  Total:  0    Modified Chago Scale:  0    Performance Status:  40    Living Arrangements:  Lives with family, Lives in home and Lives >50 miles from facility    Psychosocial/Cultural:   See Palliative Psychosocial Note: No  , 3 living children.    of pancreatic cancer with hospice. Son  with hospice as well.  Daughter Francy lives with patient, daughter Criss and son Dawood /wife supportive. They do not want mom to suffer.  **Primary  to Follow**  Palliative Care  Consult: Yes    Spiritual:  F - Fely and Belief:  Non Mosque  I - Importance:  Yes  C - Community:  Unknown  A - Address in Care:  Unknown           ROS:  Review of Systems   Constitutional:  Positive for activity change, appetite change and fatigue.   Respiratory:  Positive for shortness of breath.    Cardiovascular:  Negative for chest pain.   Neurological:  Positive for weakness.         Past Medical History:   Diagnosis Date     "Anticoagulant long-term use     Arthritis     Breast cancer 2014    invasive lobular carcinoma    Cancer of kidney 11/2020    RIGHT KIDNEY CANCER    CHF (congestive heart failure)     Coronary artery disease dx 2005    Depression     Diabetes mellitus     Diastolic heart failure secondary to hypertension     Gout     Hyperlipemia     Hypertension     Hypertrophy of nasal turbinates     Kidney mass 2020    Right    Levoscoliosis     Lung nodule     left    Multiple thyroid nodules     NICOLE (obstructive sleep apnea)     uses C-PAP    Pulmonary hypertension      Past Surgical History:   Procedure Laterality Date    AORTOGRAPHY N/A 12/04/2020    Procedure: Aortogram;  Surgeon: Paul Pedersen MD;  Location: [facility] CATH;  Service: Cardiology;  Laterality: N/A;    BREAST SURGERY      CARDIAC CATHETERIZATION  12/2020    CHOLECYSTECTOMY      COLONOSCOPY      multi -last 2014     CORONARY ARTERY BYPASS GRAFT      ESOPHAGOGASTRODUODENOSCOPY      2012     HAND SURGERY Right     INSERTION OF CATHETER N/A 6/23/2024    Procedure: Insertion,catheter;  Surgeon: Meli Griffin MD;  Location: [facility] OR;  Service: Vascular;  Laterality: N/A;  ORIGINAL CATHETER WAS REPOSITIONED.  NO NEW CATHETER WAS PLACED    INSERTION, CATHETER, DIALYSIS, PERITONEAL N/A 6/4/2024    Procedure: IN Insertion Catheter, Dialysis, Peritoneal - laparoscopic;  Surgeon: Rodriguez Catalan Jr., MD;  Location: [facility] OR;  Service: General;  Laterality: N/A;    LAPAROSCOPIC ROBOT-ASSISTED SURGICAL REMOVAL OF KIDNEY USING DA CHELLE XI Right 03/10/2022    Procedure: XI ROBOTIC NEPHRECTOMY- radical;  Surgeon: Rolando Ramirez MD;  Location: [facility] OR;  Service: Urology;  Laterality: Right;    MASTECTOMY W/ SENTINEL NODE BIOPSY Bilateral 01/21/2015    bilateral "dog ears"    NASAL SINUS SURGERY  2015    Dr Bryant FESS/cauterization turbinate     PARTIAL HYSTERECTOMY  1989    PERCUTANEOUS TRANSLUMINAL BALLOON ANGIOPLASTY OF CORONARY ARTERY  12/04/2020    Procedure: " Angioplasty-coronary;  Surgeon: Paul Pedersen MD;  Location: [facility] CATH;  Service: Cardiology;;    RENAL BIOPSY Right     9/20/2021 EJ    TUBAL LIGATION      ULTRASOUND GUIDANCE  12/04/2020    Procedure: Ultrasound Guidance;  Surgeon: Paul Pedersen MD;  Location: [facility] CATH;  Service: Cardiology;;     Family History   Problem Relation Name Age of Onset    Breast cancer Mother      Stroke Father Waqar Sr.     Hypertension Father Waqar Sr.     Hepatitis Brother      Asthma Daughter Nell Taylor     Birth defects Daughter Nell Camejo's two children has cleft lips    Depression Daughter Nell Taylor     Drug abuse Daughter Nell Taylor     Learning disabilities Bam Taylor     Mental illness Bam Taylor     Breast cancer Maternal Aunt      Glaucoma Sister      Drug abuse Daughter Nell     Macular degeneration Neg Hx      Retinal detachment Neg Hx           Review of patient's allergies indicates:   Allergen Reactions    Percocet [oxycodone-acetaminophen] Itching    Amiodarone analogues      Itching      Irbesartan Swelling    Januvia [sitagliptin]     Jardiance [empagliflozin]      Leg cramps    Lipitor [atorvastatin] Other (See Comments)     Severe leg pain    Linaclotide Other (See Comments) and Nausea And Vomiting     Does not remember    Lubiprostone Other (See Comments) and Palpitations     Does not remember       Medications:    Current Facility-Administered Medications:     acetaminophen tablet 650 mg, 650 mg, Oral, Q6H PRN, Zakia Miranda DO, 650 mg at 07/05/24 1550    albuterol sulfate nebulizer solution 2.5 mg, 2.5 mg, Nebulization, Q4H PRN, Zakia Mrianda DO    [START ON 7/9/2024] aspirin EC tablet 81 mg, 81 mg, Oral, Daily, Zakia Miranda DO    dextromethorphan-guaiFENesin  mg/5 ml liquid 5 mL, 5 mL, Oral, Q4H PRN, Zakia Miranda DO, 5 mL at 07/03/24 1302    dextrose 10% bolus 125 mL 125 mL, 12.5 g, Intravenous, PRN, Zakia Miranda DO    dextrose  10% bolus 250 mL 250 mL, 25 g, Intravenous, PRN, Zakia Miranda, DO    famotidine tablet 20 mg, 20 mg, Oral, Daily, Rush Winkler MD, 20 mg at 07/07/24 0843    gentamicin 0.1 % ointment, , Topical (Top), TID, Jon Hernandez MD, Given at 07/07/24 1422    glucagon (human recombinant) injection 1 mg, 1 mg, Intramuscular, PRN, Zakia Miranda, DO    glucose chewable tablet 16 g, 16 g, Oral, PRN, Miranda, Zakia, DO    glucose chewable tablet 24 g, 24 g, Oral, PRN, Zakia Miranda, DO    [START ON 7/9/2024] heparin (porcine) injection 5,000 Units, 5,000 Units, Subcutaneous, Q8H, Zakia Miranda,     metoprolol tartrate tablet 75 mg, 75 mg, Oral, TID, Herb Coreas MD, 75 mg at 07/07/24 1422    naloxone 0.4 mg/mL injection 0.02 mg, 0.02 mg, Intravenous, PRN, Zakia Miranda,     ondansetron injection 4 mg, 4 mg, Intravenous, Q6H PRN, Zakia Miranda, , 4 mg at 07/05/24 0303    simethicone chewable tablet 80 mg, 1 tablet, Oral, TID PRN, Zakia Miranda, , 80 mg at 07/04/24 1818    sodium chloride 0.9% flush 10 mL, 10 mL, Intravenous, Q12H PRN, Zakia Miranda,     sodium chloride 0.9% flush 10 mL, 10 mL, Intravenous, PRN, Hawa Landa MD         Objective:      Physical Exam:  Vitals: Temp: 97.4 °F (36.3 °C) (07/08/24 0826)  Pulse: (!) 140 (07/08/24 0826)  Resp: 20 (07/08/24 0826)  BP: (!) 153/58 (07/08/24 0826)  SpO2: 96 % (07/08/24 0826)    Physical Exam  Constitutional:       Appearance: She is ill-appearing.   Cardiovascular:      Rate and Rhythm: Tachycardia present.   Pulmonary:      Breath sounds: Rhonchi present.   Neurological:      General: No focal deficit present.                 Labs:   Creatinine   Date Value Ref Range Status   07/08/2024 8.4 (H) 0.5 - 1.4 mg/dL Final     POC Creatinine   Date Value Ref Range Status   03/12/2021 1.1 0.5 - 1.4 mg/dL Final      Hemoglobin   Date Value Ref Range Status   07/08/2024 11.5 (L) 12.0 - 16.0 g/dL Final      Albumin   Date Value Ref Range Status   07/08/2024 2.8  (L) 3.5 - 5.2 g/dL Final   07/07/2024 2.8 (L) 3.5 - 5.2 g/dL Final   07/06/2024 2.9 (L) 3.5 - 5.2 g/dL Final          Imaging: reviewed    > 50% of  55 min visit spent in chart review, face to face discussion of goals of care,  symptom assessment, coordination of care, charting, and emotional support     Thank you for the opportunity to care for this patient and family.       Liza Pandey, CNS

## 2024-07-08 NOTE — SUBJECTIVE & OBJECTIVE
"Past Medical History:   Diagnosis Date    Anticoagulant long-term use     Arthritis     Breast cancer 2014    invasive lobular carcinoma    Cancer of kidney 11/2020    RIGHT KIDNEY CANCER    CHF (congestive heart failure)     Coronary artery disease dx 2005    Depression     Diabetes mellitus     Diastolic heart failure secondary to hypertension     Gout     Hyperlipemia     Hypertension     Hypertrophy of nasal turbinates     Kidney mass 2020    Right    Levoscoliosis     Lung nodule     left    Multiple thyroid nodules     NICOLE (obstructive sleep apnea)     uses C-PAP    Pulmonary hypertension     Severe sepsis 07/01/2024       Past Surgical History:   Procedure Laterality Date    AORTOGRAPHY N/A 12/04/2020    Procedure: Aortogram;  Surgeon: Paul Pedersen MD;  Location: [facility] CATH;  Service: Cardiology;  Laterality: N/A;    BREAST SURGERY      CARDIAC CATHETERIZATION  12/2020    CHOLECYSTECTOMY      COLONOSCOPY      multi -last 2014     CORONARY ARTERY BYPASS GRAFT      ESOPHAGOGASTRODUODENOSCOPY      2012     HAND SURGERY Right     INSERTION OF CATHETER N/A 6/23/2024    Procedure: Insertion,catheter;  Surgeon: Meli Griffin MD;  Location: [facility] OR;  Service: Vascular;  Laterality: N/A;  ORIGINAL CATHETER WAS REPOSITIONED.  NO NEW CATHETER WAS PLACED    INSERTION, CATHETER, DIALYSIS, PERITONEAL N/A 6/4/2024    Procedure: IN Insertion Catheter, Dialysis, Peritoneal - laparoscopic;  Surgeon: Rodriguez Catalan Jr., MD;  Location: [facility] OR;  Service: General;  Laterality: N/A;    LAPAROSCOPIC ROBOT-ASSISTED SURGICAL REMOVAL OF KIDNEY USING DA CHELLE XI Right 03/10/2022    Procedure: XI ROBOTIC NEPHRECTOMY- radical;  Surgeon: Rolando Ramirez MD;  Location: [facility] OR;  Service: Urology;  Laterality: Right;    MASTECTOMY W/ SENTINEL NODE BIOPSY Bilateral 01/21/2015    bilateral "dog ears"    NASAL SINUS SURGERY  2015    Dr Bryant FESS/cauterization turbinate     PARTIAL HYSTERECTOMY  1989    PERCUTANEOUS " TRANSLUMINAL BALLOON ANGIOPLASTY OF CORONARY ARTERY  2020    Procedure: Angioplasty-coronary;  Surgeon: Paul Pedersen MD;  Location: [facility] CATH;  Service: Cardiology;;    RENAL BIOPSY Right     2021 EJ    TUBAL LIGATION      ULTRASOUND GUIDANCE  2020    Procedure: Ultrasound Guidance;  Surgeon: Paul Pedersen MD;  Location: [facility] CATH;  Service: Cardiology;;       Review of patient's allergies indicates:   Allergen Reactions    Percocet [oxycodone-acetaminophen] Itching    Amiodarone analogues      Itching      Irbesartan Swelling    Januvia [sitagliptin]     Jardiance [empagliflozin]      Leg cramps    Lipitor [atorvastatin] Other (See Comments)     Severe leg pain    Linaclotide Other (See Comments) and Nausea And Vomiting     Does not remember    Lubiprostone Other (See Comments) and Palpitations     Does not remember       Family History       Problem Relation (Age of Onset)    Asthma Daughter    Birth defects Daughter    Breast cancer Mother, Maternal Aunt    Depression Daughter    Drug abuse Daughter, Daughter    Glaucoma Sister    Hepatitis Brother    Hypertension Father    Learning disabilities Daughter    Mental illness Daughter    Stroke Father          Tobacco Use    Smoking status: Former     Current packs/day: 0.00     Types: Cigarettes     Start date: 2016     Quit date: 2016     Years since quittin.5    Smokeless tobacco: Never    Tobacco comments:     quit    Substance and Sexual Activity    Alcohol use: No    Drug use: No    Sexual activity: Not Currently     Partners: Male     Birth control/protection: None      Review of Systems   Unable to perform ROS: Mental status change (Altered Mental Status)     Objective:     Vital Signs (Most Recent):  Temp: 97.2 °F (36.2 °C) (24 1604)  Pulse: (!) 141 (24 1604)  Resp: 20 (24 1604)  BP: 138/83 (24 1604)  SpO2: 98 % (24 1651) Vital Signs (24h Range):  Temp:  [97.2 °F (36.2 °C)-98.7 °F (37.1  °C)] 97.2 °F (36.2 °C)  Pulse:  [109-141] 141  Resp:  [16-20] 20  SpO2:  [93 %-100 %] 98 %  BP: (107-155)/(58-93) 138/83   Weight: (!) 137 kg (302 lb 0.5 oz)  Body mass index is 47.3 kg/m².      Intake/Output Summary (Last 24 hours) at 7/8/2024 1809  Last data filed at 7/8/2024 0524  Gross per 24 hour   Intake --   Output 0 ml   Net 0 ml          Physical Exam  Vitals and nursing note reviewed.   Constitutional:       Appearance: She is obese. She is ill-appearing.   HENT:      Head: Normocephalic.      Mouth/Throat:      Mouth: Mucous membranes are moist.   Eyes:      Pupils: Pupils are equal, round, and reactive to light.   Cardiovascular:      Rate and Rhythm: Tachycardia present.   Pulmonary:      Effort: Tachypnea present. No accessory muscle usage or respiratory distress.      Breath sounds: Decreased breath sounds present.   Abdominal:      General: Bowel sounds are normal. There is no distension.      Palpations: Abdomen is soft.      Tenderness: There is no abdominal tenderness.   Musculoskeletal:      Right lower leg: Edema present.      Left lower leg: Edema present.   Skin:     General: Skin is warm.   Neurological:      Mental Status: She is lethargic, disoriented and confused.   Psychiatric:         Attention and Perception: She is inattentive.            Vents:  Oxygen Concentration (%): 28 (07/05/24 2320)  Lines/Drains/Airways       Drain  Duration             Female External Urinary Catheter w/ Suction 07/03/24 0430 5 days              Peripheral Intravenous Line  Duration                  Peripheral IV - Single Lumen 07/08/24 1023 20 G 1 3/4 in Anterior;Right Forearm <1 day                  Significant Labs:    CBC/Anemia Profile:  Recent Labs   Lab 07/07/24  0601 07/08/24  0846   WBC 10.10 12.09   HGB 11.4* 11.5*   HCT 38.0 37.6    347   MCV 86 85   RDW 20.8* 21.3*        Chemistries:  Recent Labs   Lab 07/07/24  0601 07/08/24  0846    139   K 4.2 4.8    105   CO2 16* 13*   BUN  90* 102*   CREATININE 7.6* 8.4*   CALCIUM 9.5 9.7   ALBUMIN 2.8* 2.8*   PROT 6.3 6.1   BILITOT 1.1* 1.7*   ALKPHOS 100 160*   ALT 18 56*   AST 23 97*   MG 2.4 2.5   PHOS 6.5* 7.6*       All pertinent labs within the past 24 hours have been reviewed.    Significant Imaging: I have reviewed all pertinent imaging results/findings within the past 24 hours.

## 2024-07-08 NOTE — CONSULTS
Patient seen and evaluated by critical care medicine. To be admitted to ICU for further management. Full H&P to follow.     Desiree Painter, NIXON  Pulmonary Critical Care  07/08/2024

## 2024-07-09 LAB
OHS QRS DURATION: 136 MS
OHS QTC CALCULATION: 482 MS

## 2024-07-09 NOTE — SUBJECTIVE & OBJECTIVE
Interval History: Patient still lethargic, weakly will nod her head to questioning, does not answer questions otherwise. D/w family at bedside.     Review of patient's allergies indicates:   Allergen Reactions    Percocet [oxycodone-acetaminophen] Itching    Amiodarone analogues      Itching      Irbesartan Swelling    Januvia [sitagliptin]     Jardiance [empagliflozin]      Leg cramps    Lipitor [atorvastatin] Other (See Comments)     Severe leg pain    Linaclotide Other (See Comments) and Nausea And Vomiting     Does not remember    Lubiprostone Other (See Comments) and Palpitations     Does not remember     Current Facility-Administered Medications   Medication Frequency    0.9%  NaCl infusion (CRRT USE ONLY) Continuous    0.9%  NaCl infusion (CRRT USE ONLY) Continuous    acetaminophen tablet 650 mg Q6H PRN    albuterol sulfate nebulizer solution 2.5 mg Q4H PRN    aspirin EC tablet 81 mg Daily    dextromethorphan-guaiFENesin  mg/5 ml liquid 5 mL Q4H PRN    dextrose 10% bolus 125 mL 125 mL PRN    dextrose 10% bolus 250 mL 250 mL PRN    fentaNYL 50 mcg/mL injection 25 mcg Q5 Min PRN    gentamicin 0.1 % ointment TID    glucagon (human recombinant) injection 1 mg PRN    glucose chewable tablet 16 g PRN    glucose chewable tablet 24 g PRN    heparin (porcine) injection 5,000 Units Q8H    magnesium sulfate 2g in water 50mL IVPB (premix) PRN    magnesium sulfate 2g in water 50mL IVPB (premix) PRN    naloxone 0.4 mg/mL injection 0.02 mg PRN    ondansetron injection 4 mg Q6H PRN    simethicone chewable tablet 80 mg TID PRN    sodium chloride 0.9% flush 10 mL PRN    sodium phosphate 20.01 mmol in D5W 250 mL IVPB PRN    sodium phosphate 20.01 mmol in D5W 250 mL IVPB PRN    sodium phosphate 30 mmol in D5W 250 mL IVPB PRN    sodium phosphate 30 mmol in D5W 250 mL IVPB PRN    sodium phosphate 39.99 mmol in D5W 250 mL IVPB PRN    sodium phosphate 39.99 mmol in D5W 250 mL IVPB PRN       Objective:     Vital Signs (Most  Recent):  Temp: 97.8 °F (36.6 °C) (07/09/24 0700)  Pulse: 97 (07/09/24 0925)  Resp: 17 (07/09/24 0925)  BP: 107/63 (07/09/24 0904)  SpO2: 95 % (07/09/24 0925) Vital Signs (24h Range):  Temp:  [97.2 °F (36.2 °C)-98.6 °F (37 °C)] 97.8 °F (36.6 °C)  Pulse:  [] 97  Resp:  [17-26] 17  SpO2:  [93 %-100 %] 95 %  BP: ()/() 107/63     Weight: 128.5 kg (283 lb 4.7 oz) (07/08/24 2000)  Body mass index is 44.37 kg/m².  Body surface area is 2.46 meters squared.    I/O last 3 completed shifts:  In: 546.4 [I.V.:429.6; IV Piggyback:116.8]  Out: 419 [Other:419]     Physical Exam  Constitutional:       General: She is not in acute distress.     Appearance: Normal appearance. She is obese.   HENT:      Head: Normocephalic and atraumatic.      Mouth/Throat:      Mouth: Mucous membranes are moist.      Pharynx: Oropharynx is clear.   Cardiovascular:      Rate and Rhythm: Normal rate and regular rhythm.      Heart sounds: Normal heart sounds.   Pulmonary:      Effort: Pulmonary effort is normal.      Breath sounds: Normal breath sounds.   Abdominal:      Palpations: Abdomen is soft.   Musculoskeletal:      Right lower leg: Edema (2+) present.      Left lower leg: Edema (2+) present.   Skin:     General: Skin is warm and dry.   Neurological:      General: No focal deficit present.      Mental Status: She is alert. She is disoriented.      Motor: Weakness present.          Significant Labs:  All labs within the past 24 hours have been reviewed.     Significant Imaging:  All imaging with the past 24 hours have been reviewed.

## 2024-07-09 NOTE — HOSPITAL COURSE
Ms. Taylor was admitted to MICU on 7/8 for urgent dialysis line placement and initiated of HD. Patient received short run of CRRT overnight without issue. Encephalopathy improving with continued CRRT treatments. Persistent AFib with RVR, received Digoxin loading dose (renally dosed), restarted PO Carvedilol. Patient remains in persistent AFib RVR despite therapeutic digoxin level and uptitration of Carvedilol levels, started on Amiodarone infusion overnight. Transitioned to amio PO, started on heparin gtt for afib AC. Tolerated HD well overnight without complications and less encephalopathic upon exam. Planning for gen surg to remove PD catheter. Upon induction pt vomited copious amounts of bile, was intubated. PD cathter removed successfully. Extubated to NC 7/16.     7/18:  Patient made DNR after Palliative Care discussions yesterday.  Tolerated SLED 12 hours overnight.  Discussed difficulties regarding HD line with Interventional Nephrology and plans to replace it next week if patient it amenable.  Patient was stepped down from ICU.  7/19:  Stepped back up to medical ICU with plans for overnight SLED x 3 days.    On 7/19 Nephrology requested that the patient be stepped back up to the medical ICU for SLED. A temporary trialysis HD catheter was placed on 7/20/24 in the groin. She also began having diarrhea, which tested positive for Clostridium difficile so she was started on PO vancomycin 7/22/24. HD trial on 7/24; remained HDS. Ready for stepdown.    Stepped down to  on 7/26/24. On 8/2/2024, [facility] re-consulted for hypotension (70s/40s) and increased somnolence. Nephrology following for dialysis needs. Placed on vancomycin and zosyn due to concern for line infection, removed left femoral line. LIJ trialysis placed. On 8/3 she developed tachycardia and soft pressures with signs of distress. Code cart was rolled into the room. AED pads were placed but never used. Tachycardia improved but she required the addition  of a second pressor. Digoxin added PRN for tachycardia. After family discussion 8/5, patient made DNR. Patient accidentally removed left IJ trialysis 8/5, line could not be replaced in IJ due to blood clots. Line placed in left femoral vein. Receiving CRRT per nephro with fluid removal. NG inserted 8/6. In AM of 8/7, patient anemic, thrombocytopenic, and hypoglycemic. STAT ABG revealed pH 7.002/pCO2 43/pO2 26, emergent SLED began for clearance and volume removal. CPAP restarted, Lactate 16.57. Family (Maria Luisa and Nell) contacted, updated on current outlook. Plan to continue CPAP, Amiodarone and BP support, HD for now, hoping to extend time for family to visit, especially brother. Discontinued blood draws. Pain medicines for comfort ordered.     8/7/24: discussions with the children resulted in plans to transition to comfort focused care when all family contacted. Daughter, Maria Luisa, at bedside stated at 2200 that all family had been contacted and she was ready to transition care. Comfort care medications were ordered and patient was taken off dialysis,  called and vasopressors down-titrated. Time of death 0000 on 8/8/24.

## 2024-07-09 NOTE — ASSESSMENT & PLAN NOTE
AFib with RVR appears new following cardiac arrest at OSH. Per chart review, AFib was difficult to control. Patient endorses amiodarone reaction with itching, rash, and oral burning. Refused Sotalol, DCCV, and AICD placement at OSH. Was placed on Diltizem infusion and transitioned to PO metoprolol.     -- Unable to take PO Metoprolol due to encephalopathy  -- AFib persistent despite IV lopressor administration  -- Daily CMP  -- Keep K>4, Mag >2  -- Will consider renally dose Digoxin with therapeutic level monitoring

## 2024-07-09 NOTE — EICU
Intervention Initiated From:  Bedside    Cristela intervened regarding:  Time-Out      Comments: Called into room for time out of trialysis cath placement done by Sebastian Gillis NP. See flowsheet.

## 2024-07-09 NOTE — PROGRESS NOTES
[facility] - Cardiac Medical ICU  Critical Care Medicine  Progress Note    Patient Name: Juhi Taylor  MRN: [id number]  Admission Date: 7/3/2024  Hospital Length of Stay: 6 days  Code Status: Full Code  Attending Provider: Messi Be*  Primary Care Provider: Tony Sadler MD   Principal Problem: Hypoxic respiratory failure    Subjective:     HPI:  Juhi Taylor is a 74-year-old female with a past medical history of CKD stage 5 with recent PD catheter placement on 6/4, RCC s/p right nephrectomy, T2DM, obesity, CAD s/p PCI to Lcx and LAD in 11/2020 and HFmREF who is admitted as a transfer from [facility] for higher level of care and further cardiac evaluation. Nephrology consulted for hemodialysis. She initially presented on 6/18/24 to [facility] for hypervolemia in the setting of CKD 4-5 for which she had undergone elective PD catheter placement complicated by catheter site leaking. She had a complex hospital course, was initially placed on furosemide gtt without significant improvement and later underwent placement of tunneled line for HD with volume removal. During her second dialysis session she went into PEA arrest for which she was resuscitated, intubated and transferred to ICU. She was noted to have elevated troponin and subsequent TTE found reduced EF from 40-45% to 20-25% for which coronary angiography was recommended. Her hospital course was also complicated by dialysis line infection, significant bleeding around the catheter site, bradycardia and subsequent atrial fibrillation with RVR. Given the catheter site bleeding, anticoagulation was temporarily held. She has reported allergy to amiodarone, was initially placed on diltiazem for atrial fibrillation despite reduced EF. She discussed GOC at outside hospital where it appears that she lacked capacity per note and decided to pursue higher level of care at [facility]. At that time, gen surgery was planning to place a left tunneled catheter due  to concerns of the right side not being suitable     Upon admit, patient appeared drowsy but conversant. Daughter reports that she typically gets lethargic 2/2 to the volume overload due to not receiving dialysis. Patient reported b/l left lower extremity pain. Daughter concerned about her not having dialysis in 4 days and wants a session today.     Pt initially refusing dialysis. Seen by nephrology. VBG 7.220/33.4/34/13.7/-14. Critical care medicine consulted for emergent trialysis line placement and dialysis.     Hospital/ICU Course:  Ms. Taylor was admitted to MICU on 7/8 for urgent dialysis line placement and initiated of HD. Patient received short run of CRRT overnight without issue. Remains encephalopathic. Encephalopathy work up ongoing.     Interval History/Significant Events: HD line placed and CRRT completed overnight. Remains encephalopathic.    Review of Systems   Unable to perform ROS: Mental status change (Encephalopathic)     Objective:     Vital Signs (Most Recent):  Temp: 97.6 °F (36.4 °C) (07/09/24 1200)  Pulse: (!) 129 (07/09/24 1500)  Resp: (!) 21 (07/09/24 1500)  BP: (!) 130/59 (07/09/24 1500)  SpO2: (!) 92 % (07/09/24 1500) Vital Signs (24h Range):  Temp:  [97.2 °F (36.2 °C)-98.2 °F (36.8 °C)] 97.6 °F (36.4 °C)  Pulse:  [] 129  Resp:  [16-26] 21  SpO2:  [90 %-100 %] 92 %  BP: ()/() 130/59   Weight: 128.5 kg (283 lb 4.7 oz)  Body mass index is 44.37 kg/m².      Intake/Output Summary (Last 24 hours) at 7/9/2024 1521  Last data filed at 7/9/2024 1519  Gross per 24 hour   Intake 546.4 ml   Output 634 ml   Net -87.6 ml          Physical Exam  Vitals and nursing note reviewed.   Constitutional:       Appearance: She is obese. She is ill-appearing.   HENT:      Head: Normocephalic.      Mouth/Throat:      Mouth: Mucous membranes are moist.   Eyes:      Pupils: Pupils are equal, round, and reactive to light.   Cardiovascular:      Rate and Rhythm: Tachycardia present. Rhythm  irregular.   Pulmonary:      Effort: Tachypnea present. No accessory muscle usage or respiratory distress.      Breath sounds: Decreased breath sounds present.   Abdominal:      General: Bowel sounds are normal. There is no distension.      Palpations: Abdomen is soft.      Tenderness: There is no abdominal tenderness.   Musculoskeletal:      Right lower leg: Edema present.      Left lower leg: Edema present.   Skin:     General: Skin is warm.   Neurological:      Mental Status: She is lethargic, disoriented and confused.   Psychiatric:         Attention and Perception: She is inattentive.         Cognition and Memory: Cognition is impaired.            Vents:  Oxygen Concentration (%): 28 (07/05/24 2320)  Lines/Drains/Airways       Central Venous Catheter Line  Duration             Trialysis (Dialysis) Catheter 07/08/24 2210 left internal jugular <1 day              Drain  Duration             Female External Urinary Catheter w/ Suction 07/03/24 0430 6 days              Peripheral Intravenous Line  Duration                  Peripheral IV - Single Lumen 07/08/24 1023 20 G 1 3/4 in Anterior;Right Forearm 1 day                  Significant Labs:    CBC/Anemia Profile:  Recent Labs   Lab 07/08/24  0846 07/09/24  0325   WBC 12.09 12.78*   HGB 11.5* 10.4*   HCT 37.6 33.2*    289   MCV 85 83   RDW 21.3* 21.1*        Chemistries:  Recent Labs   Lab 07/08/24  0846 07/09/24  0325 07/09/24  1326    139 139   K 4.8 4.4 4.7    106 104   CO2 13* 19* 20*   * 90* 87*   CREATININE 8.4* 7.3* 7.6*   CALCIUM 9.7 8.7 9.2   ALBUMIN 2.8* 2.6* 2.7*   PROT 6.1 5.4*  --    BILITOT 1.7* 1.4*  --    ALKPHOS 160* 153*  --    ALT 56* 65*  --    AST 97* 84*  --    MG 2.5 2.3 2.4   PHOS 7.6* 7.0* 7.5*       All pertinent labs within the past 24 hours have been reviewed.    Significant Imaging:  I have reviewed all pertinent imaging results/findings within the past 24 hours.    ABG  Recent Labs   Lab 07/09/24  0904   PH  7.281*   PO2 42   PCO2 47.0*   HCO3 22.1*   BE -5*     Assessment/Plan:     Pulmonary  * Hypoxic respiratory failure  Patient with Hypoxic Respiratory failure which is Acute on chronic.  she is not on home oxygen. Supplemental oxygen was provided and noted-      .   Signs/symptoms of respiratory failure include- increased work of breathing and respiratory distress. Contributing diagnoses includes - Pleural effusion Labs and images were reviewed. Patient Has recent ABG, which has been reviewed. Will treat underlying causes and adjust management of respiratory failure as follows-     Cardiac/Vascular  Atrial fibrillation with RVR  AFib with RVR appears new following cardiac arrest at OSH. Per chart review, AFib was difficult to control. Patient endorses amiodarone reaction with itching, rash, and oral burning. Refused Sotalol, DCCV, and AICD placement at OSH. Was placed on Diltizem infusion and transitioned to PO metoprolol.     -- Unable to take PO Metoprolol due to encephalopathy  -- AFib persistent despite IV lopressor administration  -- Daily CMP  -- Keep K>4, Mag >2  -- Will consider renally dose Digoxin with therapeutic level monitoring    Acute on chronic combined systolic and diastolic heart failure  Echo    Left Ventricle: The left ventricle is moderately dilated. Normal wall thickness. Global hypokinesis present. There is severely reduced systolic function with a visually estimated ejection fraction of 20 - 25%.    Right Ventricle: Mild right ventricular enlargement. Wall thickness is normal. Right ventricle wall motion has global hypokinesis. Systolic function is moderately reduced.    Left Atrium: Left atrium is severely dilated.    Right Atrium: Right atrium is severely dilated.    Aortic Valve: The aortic valve is a trileaflet valve. There is mild aortic valve sclerosis.    Mitral Valve: There is mild regurgitation.    Tricuspid Valve: There is severe regurgitation.    Pulmonary Artery: There is moderate  pulmonary hypertension. The estimated pulmonary artery systolic pressure is 61 mmHg.    IVC/SVC: Elevated venous pressure at 15 mmHg.    Pericardium: There is a trivial circumferential effusion. No indication of cardiac tamponade.        Essential hypertension  - Home anti-HTN regiment on hold, normtensive on admission to ICU    Renal/  CKD stage 4 secondary to hypertension  Pt previously on PD. Now unable to remove enough volume with PD catheter.     --Nephrology following, appreciate recs  --Transfer to MICU for emergent trialysis placement and emergent dialysis, now on CRRT    Endocrine  Type 2 diabetes mellitus with microalbuminuria, without long-term current use of insulin  --POC glucose  --Hypoglycemia protocol    Palliative Care  Palliative care encounter  --Palliative care following, continued goals of care with family    ACP (advance care planning)  - Palliative care consulted, appreciate their assistance in clarification of GOC/POC with patient and family  - Family to hopeful patient's mentation will improve and patient will be able to participate in GOC discussions       Critical Care Daily Checklist:    A: Awake: RASS Goal/Actual Goal:  0  Actual:  -1   B: Spontaneous Breathing Trial Performed?  N/A   C: SAT & SBT Coordinated?  N/A                      D: Delirium: CAM-ICU Overall CAM-ICU: Positive   E: Early Mobility Performed? No   F: Feeding Goal:    Status:     Current Diet Order   Procedures    Diet NPO Except for: Medication, Sips with Medication     Order Specific Question:   Except for     Answer:   Medication     Order Specific Question:   Except for     Answer:   Sips with Medication      AS: Analgesia/Sedation None   T: Thromboembolic Prophylaxis Heparin Subcut   H: HOB > 300 Yes   U: Stress Ulcer Prophylaxis (if needed)    G: Glucose Control Hypoglycemia protocol   B: Bowel Function Stool Occurrence: 1   I: Indwelling Catheter (Lines & Damon) Necessity Trialysis (7/8)   D: De-escalation of  Antimicrobials/Pharmacotherapies N/A    Plan for the day/ETD CRRT per nephrology  Rate control for AFib     Code Status:  Family/Goals of Care: Full Code  Daughter, Maria Luisa, at bedside. Updated on POC     Critical Care Time: 60 minutes  Critical secondary to Patient has a condition that poses threat to life and bodily function: ESRD on HD/CRRT, Encephalopathy, Atrial Fibrillation       Critical care was time spent personally by me on the following activities: development of treatment plan with patient or surrogate and bedside caregivers, discussions with consultants, evaluation of patient's response to treatment, examination of patient, ordering and performing treatments and interventions, ordering and review of laboratory studies, ordering and review of radiographic studies, pulse oximetry, re-evaluation of patient's condition. This critical care time did not overlap with that of any other provider or involve time for any procedures.     Desiree Painter, NIXON  Critical Care Medicine  [facility] - Cardiac Medical ICU

## 2024-07-09 NOTE — SUBJECTIVE & OBJECTIVE
Interval History/Significant Events: HD line placed and CRRT completed overnight. Remains encephalopathic.    Review of Systems   Unable to perform ROS: Mental status change (Encephalopathic)     Objective:     Vital Signs (Most Recent):  Temp: 97.6 °F (36.4 °C) (07/09/24 1200)  Pulse: (!) 129 (07/09/24 1500)  Resp: (!) 21 (07/09/24 1500)  BP: (!) 130/59 (07/09/24 1500)  SpO2: (!) 92 % (07/09/24 1500) Vital Signs (24h Range):  Temp:  [97.2 °F (36.2 °C)-98.2 °F (36.8 °C)] 97.6 °F (36.4 °C)  Pulse:  [] 129  Resp:  [16-26] 21  SpO2:  [90 %-100 %] 92 %  BP: ()/() 130/59   Weight: 128.5 kg (283 lb 4.7 oz)  Body mass index is 44.37 kg/m².      Intake/Output Summary (Last 24 hours) at 7/9/2024 1521  Last data filed at 7/9/2024 1519  Gross per 24 hour   Intake 546.4 ml   Output 634 ml   Net -87.6 ml          Physical Exam  Vitals and nursing note reviewed.   Constitutional:       Appearance: She is obese. She is ill-appearing.   HENT:      Head: Normocephalic.      Mouth/Throat:      Mouth: Mucous membranes are moist.   Eyes:      Pupils: Pupils are equal, round, and reactive to light.   Cardiovascular:      Rate and Rhythm: Tachycardia present. Rhythm irregular.   Pulmonary:      Effort: Tachypnea present. No accessory muscle usage or respiratory distress.      Breath sounds: Decreased breath sounds present.   Abdominal:      General: Bowel sounds are normal. There is no distension.      Palpations: Abdomen is soft.      Tenderness: There is no abdominal tenderness.   Musculoskeletal:      Right lower leg: Edema present.      Left lower leg: Edema present.   Skin:     General: Skin is warm.   Neurological:      Mental Status: She is lethargic, disoriented and confused.   Psychiatric:         Attention and Perception: She is inattentive.         Cognition and Memory: Cognition is impaired.            Vents:  Oxygen Concentration (%): 28 (07/05/24 2320)  Lines/Drains/Airways       Central Venous Catheter  Line  Duration             Trialysis (Dialysis) Catheter 07/08/24 2210 left internal jugular <1 day              Drain  Duration             Female External Urinary Catheter w/ Suction 07/03/24 0430 6 days              Peripheral Intravenous Line  Duration                  Peripheral IV - Single Lumen 07/08/24 1023 20 G 1 3/4 in Anterior;Right Forearm 1 day                  Significant Labs:    CBC/Anemia Profile:  Recent Labs   Lab 07/08/24  0846 07/09/24  0325   WBC 12.09 12.78*   HGB 11.5* 10.4*   HCT 37.6 33.2*    289   MCV 85 83   RDW 21.3* 21.1*        Chemistries:  Recent Labs   Lab 07/08/24  0846 07/09/24  0325 07/09/24  1326    139 139   K 4.8 4.4 4.7    106 104   CO2 13* 19* 20*   * 90* 87*   CREATININE 8.4* 7.3* 7.6*   CALCIUM 9.7 8.7 9.2   ALBUMIN 2.8* 2.6* 2.7*   PROT 6.1 5.4*  --    BILITOT 1.7* 1.4*  --    ALKPHOS 160* 153*  --    ALT 56* 65*  --    AST 97* 84*  --    MG 2.5 2.3 2.4   PHOS 7.6* 7.0* 7.5*       All pertinent labs within the past 24 hours have been reviewed.    Significant Imaging:  I have reviewed all pertinent imaging results/findings within the past 24 hours.

## 2024-07-09 NOTE — PROGRESS NOTES
07/08/24 1945   Vital Signs   Pulse (!) 132   SpO2 98 %   /76   MAP (mmHg) 87     Pt is transferred to MICU with portable monitor and pt's belongings.

## 2024-07-09 NOTE — ASSESSMENT & PLAN NOTE
74-year-old female with DMT2, ESRD on HD, pAF, CAD s/p PCI and HFrEF is admitted as a transfer for acute HFrEF in setting of CKD5 and recent PEA arrest during dialysis. Recent PD catheter placement on 6/4 but concerned for a possibly leak from cath. Unable to initiate dialysis for 2 weeks PTA to OSH due to insurance issues. Hospital course complicated by infected tunneled cath removed on 6/30. Transferred here for higher level of care. Initial plan at OSH was to replace tunneled catheter on the left side due to concerns to that right side may not by suitable. Peritoneal fluid studies negative for peritonitis. PD was initiated here in hospital however we were unable to remove significant amounts of fluid, palliative team was consulted. At this time the daughter wishes to proceed with HD.     Plan:  - Due to worsening acidosis and concern for uremia, trialysis catheter was placed and she received SLED overnight. We will repeat SLED again today to make sure she is not having uremic encephalopathy. The goal is to improve her mentation so she can have a goals of care discussion.   - Trend RFP; replete electrolytes PRN for goal K > 4, Phos > 3, Mg > 2  - Strict I&Os  - Avoid nephrotoxic agents  - Renally adjust medications

## 2024-07-09 NOTE — PROGRESS NOTES
07/09/24 0127   Treatment   Treatment Type SLED   Treatment Status Blood returned   Dialysis Machine Number 2.06   Dialyzer Time (hours) 19.3   Access Temporary Cath;Left;IJ   CRRT Comments SLED tx completed

## 2024-07-09 NOTE — PT/OT/SLP DISCHARGE
Physical Therapy Discharge Summary    Name: Juhi Taylor  MRN: [id number]   Principal Problem: Hypoxic respiratory failure     Patient Discharged from acute Physical Therapy on 2024.  Please refer to prior PT noted date on 2024 for functional status.     Assessment:     Patient appropriate for care in another setting. Patient transferred to lower level of care secondary to emergent trialysis line placement and initiation of dialysis on .     Objective:     GOALS:   Multidisciplinary Problems       Physical Therapy Goals          Problem: Physical Therapy    Goal Priority Disciplines Outcome Goal Variances Interventions   Physical Therapy Goal     PT, PT/OT Progressing     Description: Goals to be met by: 24     Patient will increase functional independence with mobility by performin. Supine to sit with Contact Guard Assistance  2. Sit to stand transfer with Contact Guard Assistance with RW  3. Bed to chair transfer with Contact Guard Assistance using Rolling Walker  4. Gait  x 50 feet with Contact Guard Assistance using Rolling Walker.                          Reasons for Discontinuation of Therapy Services  Transfer to alternate level of care.      Plan:     Patient Discharged to: MICU.      2024

## 2024-07-09 NOTE — PROGRESS NOTES
[facility] - Card[facility] Medical ICU  Nephrology  Progress Note    Patient Name: Juhi Taylor  MRN: [id number]  Admission Date: 7/3/2024  Hospital Length of Stay: 6 days  Attending Provider: Messi Be*   Primary Care Physician: Tony Sadler MD  Principal Problem:Hypoxic respiratory failure    Subjective:     HPI: 74-year-old female with prior history of CKD stage 5 with recent PD catheter placement on 6/4, RCC s/p right nephrectomy, T2DM, obesity, CAD s/p PCI to Lcx and LAD in 11/2020 and HFmREF who is admitted as a transfer from [facility] for higher level of care and further cardiac evaluation. Nephrology consulted for hemodialysis.     She initially presented on 6/18/24 to [facility] for hypervolemia in the setting of CKD 4-5 for which she had undergone elective PD catheter placement complicated by catheter site leaking. She had a complex hospital course, was initially placed on furosemide gtt without significant improvement and later underwent placement of tunneled line for HD with volume removal. During her second dialysis session she went into PEA arrest for which she was resuscitated, intubated and transferred to ICU. She was noted to have elevated troponin and subsequent TTE found reduced EF from 40-45% to 20-25% for which coronary angiography was recommended. Her hospital course was also complicated by dialysis line infection, significant bleeding around the catheter site, bradycardia and subsequent atrial fibrillation with RVR. Given the catheter site bleeding, anticoagulation was temporarily held. She has reported allergy to amiodarone, was initially placed on diltiazem for atrial fibrillation despite reduced EF. She discussed GOC at outside hospital where it appears that she lacked capacity per note and decided to pursue higher level of care at [facility]. At that time, gen surgery was planning to place a left tunneled catheter due to concerns of the right side not being suitable    Upon admit,  patient appeared drowsy but conversant. Daughter reports that she typically gets lethargic 2/2 to the volume overload due to not receiving dialysis. Patient reported b/l left lower extremity pain. Daughter concerned about her not having dialysis in 4 days and wants a session today.     Interval History: Patient still lethargic, weakly will nod her head to questioning, does not answer questions otherwise. D/w family at bedside.     Review of patient's allergies indicates:   Allergen Reactions    Percocet [oxycodone-acetaminophen] Itching    Amiodarone analogues      Itching      Irbesartan Swelling    Januvia [sitagliptin]     Jardiance [empagliflozin]      Leg cramps    Lipitor [atorvastatin] Other (See Comments)     Severe leg pain    Linaclotide Other (See Comments) and Nausea And Vomiting     Does not remember    Lubiprostone Other (See Comments) and Palpitations     Does not remember     Current Facility-Administered Medications   Medication Frequency    0.9%  NaCl infusion (CRRT USE ONLY) Continuous    0.9%  NaCl infusion (CRRT USE ONLY) Continuous    acetaminophen tablet 650 mg Q6H PRN    albuterol sulfate nebulizer solution 2.5 mg Q4H PRN    aspirin EC tablet 81 mg Daily    dextromethorphan-guaiFENesin  mg/5 ml liquid 5 mL Q4H PRN    dextrose 10% bolus 125 mL 125 mL PRN    dextrose 10% bolus 250 mL 250 mL PRN    fentaNYL 50 mcg/mL injection 25 mcg Q5 Min PRN    gentamicin 0.1 % ointment TID    glucagon (human recombinant) injection 1 mg PRN    glucose chewable tablet 16 g PRN    glucose chewable tablet 24 g PRN    heparin (porcine) injection 5,000 Units Q8H    magnesium sulfate 2g in water 50mL IVPB (premix) PRN    magnesium sulfate 2g in water 50mL IVPB (premix) PRN    naloxone 0.4 mg/mL injection 0.02 mg PRN    ondansetron injection 4 mg Q6H PRN    simethicone chewable tablet 80 mg TID PRN    sodium chloride 0.9% flush 10 mL PRN    sodium phosphate 20.01 mmol in D5W 250 mL IVPB PRN    sodium phosphate  20.01 mmol in D5W 250 mL IVPB PRN    sodium phosphate 30 mmol in D5W 250 mL IVPB PRN    sodium phosphate 30 mmol in D5W 250 mL IVPB PRN    sodium phosphate 39.99 mmol in D5W 250 mL IVPB PRN    sodium phosphate 39.99 mmol in D5W 250 mL IVPB PRN       Objective:     Vital Signs (Most Recent):  Temp: 97.8 °F (36.6 °C) (07/09/24 0700)  Pulse: 97 (07/09/24 0925)  Resp: 17 (07/09/24 0925)  BP: 107/63 (07/09/24 0904)  SpO2: 95 % (07/09/24 0925) Vital Signs (24h Range):  Temp:  [97.2 °F (36.2 °C)-98.6 °F (37 °C)] 97.8 °F (36.6 °C)  Pulse:  [] 97  Resp:  [17-26] 17  SpO2:  [93 %-100 %] 95 %  BP: ()/() 107/63     Weight: 128.5 kg (283 lb 4.7 oz) (07/08/24 2000)  Body mass index is 44.37 kg/m².  Body surface area is 2.46 meters squared.    I/O last 3 completed shifts:  In: 546.4 [I.V.:429.6; IV Piggyback:116.8]  Out: 419 [Other:419]     Physical Exam  Constitutional:       General: She is not in acute distress.     Appearance: Normal appearance. She is obese.   HENT:      Head: Normocephalic and atraumatic.      Mouth/Throat:      Mouth: Mucous membranes are moist.      Pharynx: Oropharynx is clear.   Cardiovascular:      Rate and Rhythm: Normal rate and regular rhythm.      Heart sounds: Normal heart sounds.   Pulmonary:      Effort: Pulmonary effort is normal.      Breath sounds: Normal breath sounds.   Abdominal:      Palpations: Abdomen is soft.   Musculoskeletal:      Right lower leg: Edema (2+) present.      Left lower leg: Edema (2+) present.   Skin:     General: Skin is warm and dry.   Neurological:      General: No focal deficit present.      Mental Status: She is alert. She is disoriented.      Motor: Weakness present.          Significant Labs:  All labs within the past 24 hours have been reviewed.     Significant Imaging:  All imaging with the past 24 hours have been reviewed.  Assessment/Plan:     Renal/  CKD stage 4 secondary to hypertension  74-year-old female with DMT2, ESRD on HD, pAF, CAD  s/p PCI and HFrEF is admitted as a transfer for acute HFrEF in setting of CKD5 and recent PEA arrest during dialysis. Recent PD catheter placement on 6/4 but concerned for a possibly leak from cath. Unable to initiate dialysis for 2 weeks PTA to OSH due to insurance issues. Hospital course complicated by infected tunneled cath removed on 6/30. Transferred here for higher level of care. Initial plan at OSH was to replace tunneled catheter on the left side due to concerns to that right side may not by suitable. Peritoneal fluid studies negative for peritonitis. PD was initiated here in hospital however we were unable to remove significant amounts of fluid, palliative team was consulted. At this time the daughter wishes to proceed with HD.     Plan:  - Due to worsening acidosis and concern for uremia, trialysis catheter was placed and she received SLED overnight. We will repeat SLED again today to make sure she is not having uremic encephalopathy. BUN down to 90. The goal is to improve her mentation so she can have a goals of care discussion.   - Trend RFP; replete electrolytes PRN for goal K > 4, Phos > 3, Mg > 2  - Strict I&Os  - Avoid nephrotoxic agents  - Renally adjust medications        Thank you for your consult. I will follow-up with patient. Please contact us if you have any additional questions.    Paulo Alicea MD  Nephrology  [facility] - [facility]

## 2024-07-09 NOTE — ASSESSMENT & PLAN NOTE
- Palliative care consulted, appreciate their assistance in clarification of GOC/POC with patient and family  - Family to hopeful patient's mentation will improve and patient will be able to participate in GOC discussions

## 2024-07-09 NOTE — PT/OT/SLP DISCHARGE
Occupational Therapy Discharge Summary    Juhi Taylor  MRN: [id number]   Principal Problem: Hypoxic respiratory failure      Patient Discharged from acute Occupational Therapy on 7/9/24.  Please refer to prior OT note dated 7/6/24 for functional status.    Assessment:      Patient transferred to lower level of care secondary to emergent trialysis line placement and initiation of dialysis on 7/8 .    Objective:     GOALS:   Multidisciplinary Problems       Occupational Therapy Goals          Problem: Occupational Therapy    Goal Priority Disciplines Outcome Interventions   Occupational Therapy Goal     OT, PT/OT     Description: Goals to be met by: 08/04/24     Patient will increase functional independence with ADLs by performing:    UE Dressing with Modified Hope.  LE Dressing with Minimal Assistance.  Grooming while standing at sink with Stand-by Assistance.  Toileting from bedside commode with Minimal Assistance for hygiene and clothing management.   Toilet transfer to bedside commode with Supervision.    DME:  Tub transfer bench is required for pt to return to t/s use with shower chair at present unsuitable with need for mobiltiy greater than pt can safely complete at present.     Patient has a mobility limitation that significantly impairs their ability to participate in one or more mobility related activities of daily living, including toileting. This deficit can be resolved by using a bedside commode. Patient demonstrates mobility limitations that will cause them to be confined to one room at home without bathroom access for up to 30 days. Using a bedside commode will greatly improve the patient's ability to participate in MRADLs.     Ambulation needs TBD on progress.                              Reasons for Discontinuation of Therapy Services  T/f to ICU       Plan:     Patient Discharged to:  CMICU    7/9/2024

## 2024-07-09 NOTE — PLAN OF CARE
MICU DAILY GOALS     Family/Goals of care/Code Status   Code Status: Full Code    24H Vital Sign Range  Temp:  [97.2 °F (36.2 °C)-98.2 °F (36.8 °C)]   Pulse:  []   Resp:  [16-26]   BP: ()/()   SpO2:  [90 %-100 %]      Shift Events (include procedures and significant events)   Pt taken for CT head. Pt has received multiple doses of Metropolol 5mg IVP for heart rate >120; HR never below 120. CRRT SLED  started for a 4 hr TX.     AWAKE RASS: Goal -    Actual - RASS (Jha Agitation-Sedation Scale): drowsy    Restraint necessity: Not necessary   BREATHE SBT: Not intubated    Coordinate A & B, analgesics/sedatives Pain: managed   SAT: Not intubated   Delirium CAM-ICU: Overall CAM-ICU: Positive   Early(intubated/ Progressive (non-intubated) Mobility MOVE Screen (INTUBATED ONLY): Not intubated    Activity: Activity Management: Rolling - L1   Feeding/Nutrition Diet order: Diet/Nutrition Received: NPO, Specialty Diet/Nutrition Received: renal diet   Thrombus DVT prophylaxis: VTE Required Core Measure: Pharmacological prophylaxis initiated/maintained   HOB Elevation Head of Bed (HOB) Positioning: HOB at 30-45 degrees   Ulcer Prophylaxis GI: yes   Glucose control managed Glycemic Management: blood glucose monitored   Skin Skin assessed during: Daily Assessment    Sacrum intact/not altered? Yes  Heels intact/not altered? Yes  Surgical wound? No    CHECK ONE!   (no altered skin or altered skin) and sub boxes:  [] No Altered Skin Integrity Present    []Prevention Measures Documented    [x] Altered Skin Integrity Present or Discovered   [x] LDA present in EPIC, daily doc completed              [] LDA added if not in EPIC (describe wound).                    When describing wound, do not stage, use descriptive words only.    [] Wound Image Taken (required on admit,                   transfer/discharge and every Tuesday)    Wound Care Consulted? Yes    4 EYES:  Attending Nurse (1st set of eyes): Gonzalo  RN    Second RN/Staff Member (2nd set of eyes): ROD Winkler   Bowel Function no issues    Indwelling Catheter Necessity      Trialysis (Dialysis) Catheter 07/08/24 2210 left internal jugular-Line Necessity Review: CRRT/HD  Reviewed   De-escalation Antibiotics No        VS and assessment per flow sheet, patient progressing towards goals as tolerated, plan of care reviewed with family, all concerns addressed, will continue to monitor.

## 2024-07-09 NOTE — PLAN OF CARE
MICU DAILY GOALS     Family/Goals of care/Code Status   Code Status: Full Code    24H Vital Sign Range  Temp:  [97.2 °F (36.2 °C)-98.6 °F (37 °C)]   Pulse:  []   Resp:  [18-26]   BP: ()/()   SpO2:  [93 %-100 %]      Shift Events (include procedures and significant events)   Pt admitted to MICU room 7079 at approximately 2000 from CSU. CHG bath given upon arrival, connected to ICU monitors. Pt arrived lethargic, responded to verbal stimuli. Consent was obtained to place a dialysis line. Left IJ trialysis inserted by critical care team. CRRT ran for ordered amount of time of two hours. Tolerated well. Post procedure, pt remains drowsy/asleep. Responds to painful stimuli. 2L nasal cannula ordered. Pt had one episode of hypoglycemia, PRN dextrose bolus given. BG now WNL.    AWAKE RASS: Goal -    Actual - RASS (Jha Agitation-Sedation Scale): drowsy    Restraint necessity: Not necessary   BREATHE SBT: Not intubated    Coordinate A & B, analgesics/sedatives Pain: managed   SAT: Not intubated   Delirium CAM-ICU: Overall CAM-ICU: Positive   Early(intubated/ Progressive (non-intubated) Mobility MOVE Screen (INTUBATED ONLY): Not intubated    Activity: Activity Management: Rolling - L1   Feeding/Nutrition Diet order: Diet/Nutrition Received: NPO, Specialty Diet/Nutrition Received: renal diet   Thrombus DVT prophylaxis: VTE Required Core Measure: Pharmacological prophylaxis initiated/maintained   HOB Elevation Head of Bed (HOB) Positioning: HOB at 30-45 degrees   Ulcer Prophylaxis GI: yes   Glucose control managed Glycemic Management: blood glucose monitored   Skin Skin assessed during: Admit    Sacrum intact/not altered? Yes  Heels intact/not altered? Yes  Surgical wound? Yes    CHECK ONE!   (no altered skin or altered skin) and sub boxes:  [] No Altered Skin Integrity Present    []Prevention Measures Documented    [x] Altered Skin Integrity Present or Discovered   [x] LDA present in EPIC, daily doc  completed              [] LDA added if not in EPIC (describe wound).                    When describing wound, do not stage, use descriptive words only.    [] Wound Image Taken (required on admit,                   transfer/discharge and every Tuesday)    Wound Care Consulted? No    4 EYES:  Attending Nurse (1st set of eyes):     Second RN/Staff Member (2nd set of eyes):    Bowel Function no issues    Indwelling Catheter Necessity      Trialysis (Dialysis) Catheter 07/08/24 2210 left internal jugular-Line Necessity Review: CRRT/HD, Poor venous access     De-escalation Antibiotics Yes        VS and assessment per flow sheet, patient progressing towards goals as tolerated, plan of care reviewed with family, all concerns addressed, will continue to monitor.

## 2024-07-09 NOTE — PROCEDURES
"Juhi Taylor is a 74 y.o. female patient.    Temp: 97.2 °F (36.2 °C) (07/08/24 1604)  Pulse: (!) 129 (07/08/24 2200)  Resp: (!) 24 (07/08/24 2200)  BP: 126/78 (07/08/24 2200)  SpO2: 99 % (07/08/24 2200)  Weight: (!) 137 kg (302 lb 0.5 oz) (07/03/24 1422)  Height: 5' 7" (170.2 cm) (07/03/24 1422)       Central Line    Date/Time: 7/8/2024 10:25 PM    Performed by: Jose Roberto Gillis NP  Authorized by: Jose Roberto Gillis NP    Location procedure was performed:  [facility]  Assisting Provider:  Ellerman, Justin, MD  Pre-operative diagnosis:  Renal Failure  Post-operative diagnosis:  Renal Failure  Consent Done ?:  Yes  Time out complete?: Verified correct patient, procedure, equipment, staff, and site/side    Indications:  Hemodialysis and vascular access  Anesthesia:  Local infiltration  Local anesthetic:  Lidocaine 1% without epinephrine  Anesthetic total (ml):  4  Preparation:  Skin prepped with chlorhexidine (without alcohol)  Skin prep agent dried: Skin prep agent completely dried prior to procedure    Sterile barriers: All five maximal sterile barriers used - gloves, gown, cap, mask and large sterile sheet    Hand hygiene: Hand hygiene performed immediately prior to central venous catheter insertion    Location:  Left internal jugular  Catheter type:  Trialysis  Catheter size:  13 Fr  Inserted Catheter Length (cm):  20  Ultrasound guidance: Yes    Vessel Caliber:  Medium   patent  Comprressibility:  Normal  Needle advanced into vessel with real time ultrasound guidance.    Guidewire confirmed in vessel.    Steril sheath on probe.    Sterile gel used.  Manometry: Yes    Number of attempts:  1  Securement:  Line sutured, chlorhexidine patch, sterile dressing applied and blood return through all ports  Complications: No    Estimated blood loss (mL):  2  Specimens: No    Implants: No    XRay:  Placement verified by x-ray  Adverse Events:  None    RANDI Gillis, MSN, " AGACNP-BC  Pulmonary Critical Care Medicine   07/08/2024

## 2024-07-09 NOTE — ASSESSMENT & PLAN NOTE
Echo    Left Ventricle: The left ventricle is moderately dilated. Normal wall thickness. Global hypokinesis present. There is severely reduced systolic function with a visually estimated ejection fraction of 20 - 25%.    Right Ventricle: Mild right ventricular enlargement. Wall thickness is normal. Right ventricle wall motion has global hypokinesis. Systolic function is moderately reduced.    Left Atrium: Left atrium is severely dilated.    Right Atrium: Right atrium is severely dilated.    Aortic Valve: The aortic valve is a trileaflet valve. There is mild aortic valve sclerosis.    Mitral Valve: There is mild regurgitation.    Tricuspid Valve: There is severe regurgitation.    Pulmonary Artery: There is moderate pulmonary hypertension. The estimated pulmonary artery systolic pressure is 61 mmHg.    IVC/SVC: Elevated venous pressure at 15 mmHg.    Pericardium: There is a trivial circumferential effusion. No indication of cardiac tamponade.

## 2024-07-09 NOTE — PROGRESS NOTES
2H SLED tx initiated  UF goal rate 200     07/08/24 2320   Treatment   Treatment Type SLED   Treatment Status New start   Dialysis Machine Number [id number]   Dialyzer Time (hours) 0   BVP (Liters) 0 L   Solutions Labeled and Current  Yes   Access Temporary Cath;Left;IJ   Catheter Dressing Intact  Yes   Alarms Engaged Yes   CRRT Comments SLED initiated per MD orders   $ CRRT Charges   $ CRRT Charges Initial Setup   Prescription   Time (Hours) Other  (2)   Dialysate K + (mEq/L) 4   Dialysate CA + (mEq/L) 2.25   Dialysate HCO3 - (Bicarb) (mEq/L) 30   Dialysate Na + (mEq/L) 140   Cartridge Type Other  (R300)   Dialysate Flow Rate (mL/min) 200   UF Goal Rate 200 mL/hr   CRRT Hourly Documentation   Blood Flow (mL/min) 150   UF Rate 200 cc/hr   Arterial Pressure (mmHg) -60 mmHg   Venous Pressure (mmHg) 40 mmHg   Effluent Pressure (EP) (mmHg) 10 mmHg   Balance Chamber  0   Total UF (Hourly Cleared) (mL) 0

## 2024-07-09 NOTE — ASSESSMENT & PLAN NOTE
Pt previously on PD. Now unable to remove enough volume with PD catheter.     --Nephrology following, appreciate recs  --Transfer to MICU for emergent trialysis placement and emergent dialysis, now on CRRT

## 2024-07-09 NOTE — PROGRESS NOTES
Received report from primary nurse.  Notified CRRT TX  Sled  for 4 hrs.  CRRT restarted. via Left IJ temporary catheter  .    ..

## 2024-07-10 ENCOUNTER — ANESTHESIA EVENT (OUTPATIENT)
Dept: MEDSURG UNIT | Facility: HOSPITAL | Age: 74
End: 2024-07-10
Payer: MEDICARE

## 2024-07-10 NOTE — PLAN OF CARE
Bedside swallow eval completed. Recommend NPO diet with meds crushed in puree and ice chips sparingly.   7/10/2024

## 2024-07-10 NOTE — SUBJECTIVE & OBJECTIVE
Interval History: Mild improvement in mentation compared to yesterday, still lethargic. Having some lower abd tenderness/pain. D/w family at bedside tentative plans for tunnel cath tomorrow.     Review of patient's allergies indicates:   Allergen Reactions    Percocet [oxycodone-acetaminophen] Itching    Amiodarone analogues      Itching      Irbesartan Swelling    Januvia [sitagliptin]     Jardiance [empagliflozin]      Leg cramps    Lipitor [atorvastatin] Other (See Comments)     Severe leg pain    Linaclotide Other (See Comments) and Nausea And Vomiting     Does not remember    Lubiprostone Other (See Comments) and Palpitations     Does not remember     Current Facility-Administered Medications   Medication Frequency    0.9%  NaCl infusion (CRRT USE ONLY) Continuous    acetaminophen tablet 650 mg Q6H PRN    albuterol sulfate nebulizer solution 2.5 mg Q4H PRN    aspirin EC tablet 81 mg Daily    carvediloL tablet 12.5 mg BID    dextromethorphan-guaiFENesin  mg/5 ml liquid 5 mL Q4H PRN    dextrose 10% bolus 125 mL 125 mL PRN    dextrose 10% bolus 250 mL 250 mL PRN    gentamicin 0.1 % ointment TID    glucagon (human recombinant) injection 1 mg PRN    glucose chewable tablet 16 g PRN    glucose chewable tablet 24 g PRN    heparin (porcine) injection 5,000 Units Q8H    magnesium sulfate 2g in water 50mL IVPB (premix) PRN    naloxone 0.4 mg/mL injection 0.02 mg PRN    ondansetron injection 4 mg Q6H PRN    simethicone chewable tablet 80 mg TID PRN    sodium chloride 0.9% flush 10 mL PRN    sodium phosphate 20.01 mmol in D5W 250 mL IVPB PRN    sodium phosphate 30 mmol in D5W 250 mL IVPB PRN    sodium phosphate 39.99 mmol in D5W 250 mL IVPB PRN       Objective:     Vital Signs (Most Recent):  Temp:  (Refused temp check.) (07/10/24 0301)  Pulse: (!) 111 (07/10/24 0900)  Resp: 18 (07/10/24 0900)  BP: (!) 165/87 (07/10/24 0900)  SpO2: (!) 90 % (07/10/24 0900) Vital Signs (24h Range):  Temp:  [97 °F (36.1 °C)-97.6 °F  (36.4 °C)] 97 °F (36.1 °C)  Pulse:  [] 111  Resp:  [16-35] 18  SpO2:  [90 %-100 %] 90 %  BP: ()/() 165/87     Weight: 128.5 kg (283 lb 4.7 oz) (07/08/24 2000)  Body mass index is 44.37 kg/m².  Body surface area is 2.46 meters squared.    I/O last 3 completed shifts:  In: 1352 [I.V.:1180.9; IV Piggyback:171.1]  Out: 1168 [Other:1168]     Physical Exam  Constitutional:       General: She is not in acute distress.     Appearance: Normal appearance. She is obese.   HENT:      Head: Normocephalic and atraumatic.      Mouth/Throat:      Mouth: Mucous membranes are moist.      Pharynx: Oropharynx is clear.   Cardiovascular:      Rate and Rhythm: Normal rate and regular rhythm.      Heart sounds: Normal heart sounds.   Pulmonary:      Effort: Pulmonary effort is normal.      Breath sounds: Normal breath sounds.   Abdominal:      Palpations: Abdomen is soft.   Musculoskeletal:      Right lower leg: Edema (2+) present.      Left lower leg: Edema (2+) present.   Skin:     General: Skin is warm and dry.   Neurological:      General: No focal deficit present.      Mental Status: She is alert. She is disoriented.      Motor: Weakness present.          Significant Labs:  All labs within the past 24 hours have been reviewed.     Significant Imaging:  All imaging with the past 24 hours have been reviewed.

## 2024-07-10 NOTE — PROGRESS NOTES
07/10/24 1335   Treatment   Treatment Type SLED   Treatment Status Restart   Dialysis Machine Number [id number]   Dialyzer Time (hours) 0   BVP (Liters) 0 L   Solutions Labeled and Current  Yes   Access Temporary Cath;Left;IJ   Catheter Dressing Intact  Yes   Alarms Engaged Yes   CRRT Comments sled restarted as ordered   Prescription   Time (Hours) 10   Dialysate K + (mEq/L) 4   Dialysate CA + (mEq/L) 2.25   Dialysate HCO3 - (Bicarb) (mEq/L) 30   Dialysate Na + (mEq/L) 135   Cartridge Type Other  (r300)   Dialysate Flow Rate (mL/min) 200   UF Goal Rate 300 mL/hr   CRRT Hourly Documentation   Blood Flow (mL/min) 150   UF Rate 200 cc/hr   Arterial Pressure (mmHg) -40 mmHg   Venous Pressure (mmHg) 50 mmHg   Effluent Pressure (EP) (mmHg) 10 mmHg   Total UF (Hourly Cleared) (mL) 0     SLED restarted as ordered. LIJ CVC aspirated, flushed, and accessed using aseptic technique. Lines connected and secured.

## 2024-07-10 NOTE — ASSESSMENT & PLAN NOTE
74-year-old female with DMT2, ESRD on HD, pAF, CAD s/p PCI and HFrEF is admitted as a transfer for acute HFrEF in setting of CKD5 and recent PEA arrest during dialysis. Recent PD catheter placement on 6/4 but concerned for a possibly leak from cath. Unable to initiate dialysis for 2 weeks PTA to OSH due to insurance issues. Hospital course complicated by infected tunneled cath removed on 6/30. Transferred here for higher level of care. Initial plan at OSH was to replace tunneled catheter on the left side due to concerns to that right side may not by suitable. Peritoneal fluid studies negative for peritonitis. PD was initiated here in hospital however we were unable to remove significant amounts of fluid, palliative team was consulted. At this time the daughter wishes to proceed with HD.     Plan:  - Plan for SLED again today  - Tentatively plan for tunnel cath placement tomorrow, if mentation improves today or tomorrow morning we can have further goals of care discussion if she wishes to continue dialysis  - Trend RFP; replete electrolytes PRN for goal K > 4, Phos > 3, Mg > 2  - Strict I&Os  - Avoid nephrotoxic agents  - Renally adjust medications

## 2024-07-10 NOTE — PROGRESS NOTES
[facility] - [facility]  Nephrology  Progress Note    Patient Name: Juhi Taylor  MRN: [id number]  Admission Date: 7/3/2024  Hospital Length of Stay: 7 days  Attending Provider: Messi Be*   Primary Care Physician: Tony Sadler MD  Principal Problem:Hypoxic respiratory failure    Subjective:     HPI: 74-year-old female with prior history of CKD stage 5 with recent PD catheter placement on 6/4, RCC s/p right nephrectomy, T2DM, obesity, CAD s/p PCI to Lcx and LAD in 11/2020 and HFmREF who is admitted as a transfer from [facility] for higher level of care and further cardiac evaluation. Nephrology consulted for hemodialysis.     She initially presented on 6/18/24 to [facility] for hypervolemia in the setting of CKD 4-5 for which she had undergone elective PD catheter placement complicated by catheter site leaking. She had a complex hospital course, was initially placed on furosemide gtt without significant improvement and later underwent placement of tunneled line for HD with volume removal. During her second dialysis session she went into PEA arrest for which she was resuscitated, intubated and transferred to ICU. She was noted to have elevated troponin and subsequent TTE found reduced EF from 40-45% to 20-25% for which coronary angiography was recommended. Her hospital course was also complicated by dialysis line infection, significant bleeding around the catheter site, bradycardia and subsequent atrial fibrillation with RVR. Given the catheter site bleeding, anticoagulation was temporarily held. She has reported allergy to amiodarone, was initially placed on diltiazem for atrial fibrillation despite reduced EF. She discussed GOC at outside hospital where it appears that she lacked capacity per note and decided to pursue higher level of care at [facility]. At that time, gen surgery was planning to place a left tunneled catheter due to concerns of the right side not being suitable    Upon admit,  patient appeared drowsy but conversant. Daughter reports that she typically gets lethargic 2/2 to the volume overload due to not receiving dialysis. Patient reported b/l left lower extremity pain. Daughter concerned about her not having dialysis in 4 days and wants a session today.     Interval History: Mild improvement in mentation compared to yesterday, still lethargic. Having some lower abd tenderness/pain. D/w family at bedside tentative plans for tunnel cath tomorrow.     Review of patient's allergies indicates:   Allergen Reactions    Percocet [oxycodone-acetaminophen] Itching    Amiodarone analogues      Itching      Irbesartan Swelling    Januvia [sitagliptin]     Jardiance [empagliflozin]      Leg cramps    Lipitor [atorvastatin] Other (See Comments)     Severe leg pain    Linaclotide Other (See Comments) and Nausea And Vomiting     Does not remember    Lubiprostone Other (See Comments) and Palpitations     Does not remember     Current Facility-Administered Medications   Medication Frequency    0.9%  NaCl infusion (CRRT USE ONLY) Continuous    acetaminophen tablet 650 mg Q6H PRN    albuterol sulfate nebulizer solution 2.5 mg Q4H PRN    aspirin EC tablet 81 mg Daily    carvediloL tablet 12.5 mg BID    dextromethorphan-guaiFENesin  mg/5 ml liquid 5 mL Q4H PRN    dextrose 10% bolus 125 mL 125 mL PRN    dextrose 10% bolus 250 mL 250 mL PRN    gentamicin 0.1 % ointment TID    glucagon (human recombinant) injection 1 mg PRN    glucose chewable tablet 16 g PRN    glucose chewable tablet 24 g PRN    heparin (porcine) injection 5,000 Units Q8H    magnesium sulfate 2g in water 50mL IVPB (premix) PRN    naloxone 0.4 mg/mL injection 0.02 mg PRN    ondansetron injection 4 mg Q6H PRN    simethicone chewable tablet 80 mg TID PRN    sodium chloride 0.9% flush 10 mL PRN    sodium phosphate 20.01 mmol in D5W 250 mL IVPB PRN    sodium phosphate 30 mmol in D5W 250 mL IVPB PRN    sodium phosphate 39.99 mmol in D5W 250 mL  IVPB PRN       Objective:     Vital Signs (Most Recent):  Temp:  (Refused temp check.) (07/10/24 0301)  Pulse: (!) 111 (07/10/24 0900)  Resp: 18 (07/10/24 0900)  BP: (!) 165/87 (07/10/24 0900)  SpO2: (!) 90 % (07/10/24 0900) Vital Signs (24h Range):  Temp:  [97 °F (36.1 °C)-97.6 °F (36.4 °C)] 97 °F (36.1 °C)  Pulse:  [] 111  Resp:  [16-35] 18  SpO2:  [90 %-100 %] 90 %  BP: ()/() 165/87     Weight: 128.5 kg (283 lb 4.7 oz) (07/08/24 2000)  Body mass index is 44.37 kg/m².  Body surface area is 2.46 meters squared.    I/O last 3 completed shifts:  In: 1352 [I.V.:1180.9; IV Piggyback:171.1]  Out: 1168 [Other:1168]     Physical Exam  Constitutional:       General: She is not in acute distress.     Appearance: Normal appearance. She is obese.   HENT:      Head: Normocephalic and atraumatic.      Mouth/Throat:      Mouth: Mucous membranes are moist.      Pharynx: Oropharynx is clear.   Cardiovascular:      Rate and Rhythm: Normal rate and regular rhythm.      Heart sounds: Normal heart sounds.   Pulmonary:      Effort: Pulmonary effort is normal.      Breath sounds: Normal breath sounds.   Abdominal:      Palpations: Abdomen is soft.   Musculoskeletal:      Right lower leg: Edema (2+) present.      Left lower leg: Edema (2+) present.   Skin:     General: Skin is warm and dry.   Neurological:      General: No focal deficit present.      Mental Status: She is alert. She is disoriented.      Motor: Weakness present.          Significant Labs:  All labs within the past 24 hours have been reviewed.     Significant Imaging:  All imaging with the past 24 hours have been reviewed.  Assessment/Plan:     Renal/  CKD stage 4 secondary to hypertension  74-year-old female with DMT2, ESRD on HD, pAF, CAD s/p PCI and HFrEF is admitted as a transfer for acute HFrEF in setting of CKD5 and recent PEA arrest during dialysis. Recent PD catheter placement on 6/4 but concerned for a possibly leak from cath. Unable to  initiate dialysis for 2 weeks PTA to OSH due to insurance issues. Hospital course complicated by infected tunneled cath removed on 6/30. Transferred here for higher level of care. Initial plan at OSH was to replace tunneled catheter on the left side due to concerns to that right side may not by suitable. Peritoneal fluid studies negative for peritonitis. PD was initiated here in hospital however we were unable to remove significant amounts of fluid, palliative team was consulted. At this time the daughter wishes to proceed with HD.     Plan:  - Plan for SLED again today  - Tentatively plan for tunnel cath placement tomorrow, if mentation improves today or tomorrow morning we can have further goals of care discussion if she wishes to continue dialysis  - With worsening WBC count and abd tenderness, will check a sample of peritoneal fluid out of concern for infection  - Trend RFP; replete electrolytes PRN for goal K > 4, Phos > 3, Mg > 2  - Strict I&Os  - Avoid nephrotoxic agents  - Renally adjust medications        Thank you for your consult. I will follow-up with patient. Please contact us if you have any additional questions.    Paulo Alicea MD  Nephrology  [facility] - [facility]

## 2024-07-10 NOTE — SUBJECTIVE & OBJECTIVE
Interval History/Significant Events: Mentation improving, now oriented to self and able to answer simple questions.     Review of Systems   Respiratory:  Negative for shortness of breath.    Cardiovascular:  Negative for chest pain.   Gastrointestinal:  Positive for abdominal pain.     Objective:     Vital Signs (Most Recent):  Temp:  (Refused temp check.) (07/10/24 0301)  Pulse: (!) 121 (07/10/24 1100)  Resp: 19 (07/10/24 1100)  BP: (!) 131/91 (07/10/24 1100)  SpO2: (!) 93 % (07/10/24 1100) Vital Signs (24h Range):  Temp:  [97 °F (36.1 °C)] 97 °F (36.1 °C)  Pulse:  [] 121  Resp:  [17-35] 19  SpO2:  [90 %-100 %] 93 %  BP: ()/() 131/91   Weight: 128.5 kg (283 lb 4.7 oz)  Body mass index is 44.37 kg/m².      Intake/Output Summary (Last 24 hours) at 7/10/2024 1356  Last data filed at 7/10/2024 1335  Gross per 24 hour   Intake 805.62 ml   Output 749 ml   Net 56.62 ml          Physical Exam  Vitals and nursing note reviewed.   Constitutional:       Appearance: She is obese. She is ill-appearing.   HENT:      Head: Normocephalic.      Mouth/Throat:      Mouth: Mucous membranes are moist.   Eyes:      Pupils: Pupils are equal, round, and reactive to light.   Cardiovascular:      Rate and Rhythm: Tachycardia present. Rhythm irregular.   Pulmonary:      Effort: No accessory muscle usage or respiratory distress.      Breath sounds: Decreased breath sounds present.   Abdominal:      General: Bowel sounds are normal. There is no distension.      Palpations: Abdomen is soft.      Tenderness: There is no abdominal tenderness.      Comments: Peritoneal catheter in palce   Musculoskeletal:      Right lower leg: Edema present.      Left lower leg: Edema present.   Skin:     General: Skin is warm.   Neurological:      General: No focal deficit present.      Mental Status: She is lethargic and disoriented.      GCS: GCS eye subscore is 4. GCS verbal subscore is 5. GCS motor subscore is 6.      Comments: Oriented to  self            Vents:  Oxygen Concentration (%): 28 (07/05/24 2320)  Lines/Drains/Airways       Central Venous Catheter Line  Duration             Trialysis (Dialysis) Catheter 07/08/24 2210 left internal jugular 1 day              Peripheral Intravenous Line  Duration                  Peripheral IV - Single Lumen 07/08/24 1023 20 G 1 3/4 in Anterior;Right Forearm 2 days                  Significant Labs:    CBC/Anemia Profile:  Recent Labs   Lab 07/09/24  0325 07/10/24  0306   WBC 12.78* 14.98*   HGB 10.4* 10.9*   HCT 33.2* 35.8*    254   MCV 83 85   RDW 21.1* 21.3*        Chemistries:  Recent Labs   Lab 07/09/24  0325 07/09/24  1326 07/09/24  1815 07/09/24  2056 07/10/24  0306    139 136 136 136   K 4.4 4.7 4.5 4.5 4.3    104 104 105 104   CO2 19* 20* 19* 19* 18*   BUN 90* 87* 64* 69* 72*   CREATININE 7.3* 7.6* 5.5* 6.5* 6.9*   CALCIUM 8.7 9.2 8.2* 8.6* 8.6*   ALBUMIN 2.6* 2.7* 2.7* 2.7* 2.6*   PROT 5.4*  --   --   --  5.6*   BILITOT 1.4*  --   --   --  1.4*   ALKPHOS 153*  --   --   --  176*   ALT 65*  --   --   --  67*   AST 84*  --   --   --  65*   MG 2.3 2.4  --  2.1 2.2   PHOS 7.0* 7.5* 5.1* 5.8* 6.2*       All pertinent labs within the past 24 hours have been reviewed.    Significant Imaging:  I have reviewed all pertinent imaging results/findings within the past 24 hours.

## 2024-07-10 NOTE — PT/OT/SLP EVAL
Speech Language Pathology Evaluation  Bedside Swallow    Patient Name:  Juhi Taylor   MRN:  [id number]  Admitting Diagnosis: Hypoxic respiratory failure    Recommendations:                 General Recommendations:  Dysphagia therapy  Diet recommendations:  NPO, NPO   Aspiration Precautions: Strict aspiration precautions   General Precautions: Standard, fall  Communication strategies:  provide increased time to answer    Assessment:     Juhi Taylor is a 74 y.o. female with an SLP diagnosis of Dysphagia.     History:     Past Medical History:   Diagnosis Date    Anticoagulant long-term use     Arthritis     Breast cancer 2014    invasive lobular carcinoma    Cancer of kidney 11/2020    RIGHT KIDNEY CANCER    CHF (congestive heart failure)     Coronary artery disease dx 2005    Depression     Diabetes mellitus     Diastolic heart failure secondary to hypertension     Gout     Hyperlipemia     Hypertension     Hypertrophy of nasal turbinates     Kidney mass 2020    Right    Levoscoliosis     Lung nodule     left    Multiple thyroid nodules     NICOLE (obstructive sleep apnea)     uses C-PAP    Pulmonary hypertension     Severe sepsis 07/01/2024       Past Surgical History:   Procedure Laterality Date    AORTOGRAPHY N/A 12/04/2020    Procedure: Aortogram;  Surgeon: Paul Pedersen MD;  Location: [facility];  Service: Cardiology;  Laterality: N/A;    BREAST SURGERY      CARDIAC CATHETERIZATION  12/2020    CHOLECYSTECTOMY      COLONOSCOPY      multi -last 2014     CORONARY ARTERY BYPASS GRAFT      ESOPHAGOGASTRODUODENOSCOPY      2012     HAND SURGERY Right     INSERTION OF CATHETER N/A 6/23/2024    Procedure: Insertion,catheter;  Surgeon: Meli Griffin MD;  Location: [facility];  Service: Vascular;  Laterality: N/A;  ORIGINAL CATHETER WAS REPOSITIONED.  NO NEW CATHETER WAS PLACED    INSERTION, CATHETER, DIALYSIS, PERITONEAL N/A 6/4/2024    Procedure: IN Insertion Catheter, Dialysis, Peritoneal - laparoscopic;   "Surgeon: Rodriguez Catalan Jr., MD;  Location: [facility] OR;  Service: General;  Laterality: N/A;    LAPAROSCOPIC ROBOT-ASSISTED SURGICAL REMOVAL OF KIDNEY USING DA CHELLE XI Right 03/10/2022    Procedure: XI ROBOTIC NEPHRECTOMY- radical;  Surgeon: Rolando Ramirez MD;  Location: [facility] OR;  Service: Urology;  Laterality: Right;    MASTECTOMY W/ SENTINEL NODE BIOPSY Bilateral 01/21/2015    bilateral "dog ears"    NASAL SINUS SURGERY  2015    Dr Bryant FESS/cauterization turbinate     PARTIAL HYSTERECTOMY  1989    PERCUTANEOUS TRANSLUMINAL BALLOON ANGIOPLASTY OF CORONARY ARTERY  12/04/2020    Procedure: Angioplasty-coronary;  Surgeon: Paul Pedersen MD;  Location: [facility] CATH;  Service: Cardiology;;    RENAL BIOPSY Right     9/20/2021 EJ    TUBAL LIGATION      ULTRASOUND GUIDANCE  12/04/2020    Procedure: Ultrasound Guidance;  Surgeon: Paul Pedersen MD;  Location: [facility] CATH;  Service: Cardiology;;       Prior Intubation HX:  6/24-6/27    Chest X-Rays: 7/8 There is a left internal jugular central venous catheter with the tip at the venous confluence. The lungs are well expanded. There are continued opacities in the right greater than left lungs, suspicious for pulmonary edema/CHF. There has been no significant detrimental change from prior. The pleural spaces are clear the cardiac silhouette is enlarged. Osseous structures are intact.     Prior diet: soft/bite sized and thin.      Subjective     Awake yet lethargic  Open mouth posture  Daughter Nell at bedside    Pain/Comfort:  Pain Rating 1: 0/10  Pain Rating Post-Intervention 1: 0/10    Respiratory Status: Room air    Objective:     Oral Musculature Evaluation  Oral Musculature: general weakness  Dentition: scattered dentition  Oral Labial Strength and Mobility: impaired seal, impaired retraction  Lingual Strength and Mobility: WFL  Volitional Cough: decreased strength  Voice Prior to PO Intake: hoarse, low intensity    Bedside Swallow Eval:   Consistencies " Assessed:  Thin liquids tsp x2  Puree x3      Oral Phase:   Decreased closure around utensil  Slow oral transit time    Pharyngeal Phase:   coughing/choking post tsp sips of thin  delayed swallow initation  multiple spontaneous swallows    Treatment: Cues required throughout session to fully rouse for PO trials. Pt with eyes closed and open mouth posture throughout session. Recommend NPO diet with ice chips for pleasure and meds crushed in puree. SLP provided education to pt and daughter regarding diet recs, swallow precautions, need for NPO diet, and role of SLP.     Goals:   Multidisciplinary Problems       SLP Goals          Problem: SLP    Goal Priority Disciplines Outcome   SLP Goal     SLP    Description: Speech Language Pathology Goals  Goals expected to be met by 7/18    1. Pt will participate in ongoing swallow assessment                               Plan:     Patient to be seen:  4 x/week   Plan of Care reviewed with:  patient, family   SLP Follow-Up:  Yes       Discharge recommendations:    TBD      Time Tracking:     SLP Treatment Date:   07/10/24  Speech Start Time:  1034  Speech Stop Time:  1050     Speech Total Time (min):  16 min    Billable Minutes: Eval Swallow and Oral Function 8 and Self Care/Home Management Training 8    07/10/2024

## 2024-07-10 NOTE — ANESTHESIA PREPROCEDURE EVALUATION
[facility]  Anesthesia Pre-Operative Evaluation         Patient Name: Juhi Taylor  YOB: 1950  MRN: [id number]    SUBJECTIVE:     Pre-operative evaluation for Procedure(s) (LRB):  Insertion, Catheter, Central Venous, Hemodialysis (Right)     07/10/2024    Juhi Taylor is a 74 y.o. female w/ a significant PMHx of CKD stage 5 with recent PD catheter placement on 6/4, RCC s/p right nephrectomy, T2DM, BMI 44, CAD s/p PCI to Lcx and LAD in 11/2020, NICOLE, afib with RVR, pulm HTN (PASP 61 mmHg), and moderately reduced RV systolic function. Pt transferred from OSH after PEA arrest during HD session.    Patient now presents for the above procedure(s).    Results for orders placed during the hospital encounter of 07/03/24    Echo    Interpretation Summary    Left Ventricle: The left ventricle is moderately dilated. Normal wall thickness. Global hypokinesis present. There is severely reduced systolic function with a visually estimated ejection fraction of 20 - 25%.    Right Ventricle: Mild right ventricular enlargement. Wall thickness is normal. Right ventricle wall motion has global hypokinesis. Systolic function is moderately reduced.    Left Atrium: Left atrium is severely dilated.    Right Atrium: Right atrium is severely dilated.    Aortic Valve: The aortic valve is a trileaflet valve. There is mild aortic valve sclerosis.    Mitral Valve: There is mild regurgitation.    Tricuspid Valve: There is severe regurgitation.    Pulmonary Artery: There is moderate pulmonary hypertension. The estimated pulmonary artery systolic pressure is 61 mmHg.    IVC/SVC: Elevated venous pressure at 15 mmHg.    Pericardium: There is a trivial circumferential effusion. No indication of cardiac tamponade.      LDA:   Trialysis (Dialysis) Catheter 07/08/24 2210 left internal jugular (Active)   $ Dialysis Supplies Short Term Dialysis Catheter (Supply) 07/09/24 0715   Line Necessity Review CRRT/HD  07/10/24 0715   Verification by X-ray Yes 07/10/24 0715   Site Assessment No drainage;No redness;No swelling;No warmth 07/10/24 1105   Line Securement Device Secured with sutures 07/10/24 0715   Dressing Type CHG impregnated dressing/sponge;Central line dressing 07/10/24 1105   Dressing Status Clean;Dry;Intact 07/10/24 1105   Dressing Intervention Integrity maintained 07/10/24 1105   Date on Dressing 07/08/24 07/10/24 0715   Dressing Due to be Changed 07/15/24 07/10/24 1105   Venous Patency/Care flushed w/o difficulty 07/10/24 1105   Arterial Patency/Care flushed w/o difficulty 07/10/24 1105   Distal Patency/Care infusing 07/10/24 1105   Waveform Not being transduced 07/10/24 1105   Number of days: 1            Peripheral IV - Single Lumen 07/08/24 1023 20 G 1 3/4 in Anterior;Right Forearm (Active)   Site Assessment Clean;Dry;Intact;No redness;No swelling 07/10/24 1105   Extremity Assessment Distal to IV No abnormal discoloration 07/10/24 1105   Line Status Flushed 07/10/24 1105   Dressing Status Clean;Dry;Intact 07/10/24 1105   Dressing Intervention Integrity maintained 07/10/24 1105   Dressing Change Due 07/12/24 07/10/24 1105   Site Change Due 07/12/24 07/10/24 0715   Reason Not Rotated Not due 07/10/24 1105   Number of days: 2       Prev airway:   Intubation     Date/Time: 6/4/2024 11:21 AM     Performed by: Pat Fletcher CRNA  Authorized by: Pat Fletcher CRNA    Intubation:     Induction:  Intravenous    Intubated:  Postinduction    Mask Ventilation:  Easy mask    Attempts:  1    Attempted By:  CRNA    Method of Intubation:  Video laryngoscopy    Blade:  Glidescope 3    Laryngeal View Grade: Grade I - full view of cords      Difficult Airway Encountered?: No      Complications:  None    Airway Device:  Oral endotracheal tube    Airway Device Size:  7.0    Style/Cuff Inflation:  Cuffed    Inflation Amount (mL):  8    Tube secured:  21    Secured at:  The teeth    Placement Verified By:  Capnometry     Complicating Factors:  None    Findings Post-Intubation:  BS equal bilateral and atraumatic/condition of teeth unchanged    Drips:    sodium chloride 0.9%   Intravenous Continuous 200 mL/hr at 07/10/24 1335 New Bag at 07/10/24 1335       Patient Active Problem List   Diagnosis    Coronary artery disease    Hyperlipemia    Essential hypertension    Type 2 diabetes mellitus with microalbuminuria, without long-term current use of insulin    Morbid obesity with body mass index (BMI) greater than or equal to 50    Elevated blood pressure reading with diagnosis of hypertension    NICOLE (obstructive sleep apnea)    Non-rheumatic mitral regurgitation    Diastolic dysfunction    Statin intolerance    Thyroid nodule    Vitamin D deficiency    Hypothyroidism    Gout    Hyperparathyroidism    Goiter    Thyroiditis    Abnormal thyroid blood test    Nodular thyroid disease    Hyperuricemia    Type 2 diabetes mellitus with hyperglycemia    Proteinuria    Right kidney mass    Pulmonary nodule    Congestive heart failure    SOB (shortness of breath)    S/P coronary artery stent placement    History of breast cancer in female    Left mastectomy site mass    Severe obesity (BMI >= 40)    Osteopenia    Postmenopausal    Pulmonary hypertension    Heart failure with preserved ejection fraction    Long term (current) use of antithrombotics/antiplatelets    Diastolic heart failure secondary to hypertension    Osteoarthritis of multiple joints    Vitamin D deficient osteomalacia    Hypoparathyroidism due to impaired secretion of parathyroid hormone (PTH)    Acute on chronic systolic CHF (congestive heart failure)    Elevated BUN    Long term current use of amiodarone    Paroxysmal atrial fibrillation    Situational mixed anxiety and depressive disorder    Iron deficiency anemia    Chronic back pain    Peripheral vascular disease, unspecified    Arthralgia of multiple joints    Drug side effects, initial encounter    Renal cell carcinoma of  right kidney    Benign hypertensive heart and kidney disease with CHF, NYHA class 2 and CKD stage 4    CKD stage 4 secondary to hypertension    H/O right nephrectomy    Hypomagnesemia    Chronic kidney disease (CKD), stage V    Chronic combined systolic and diastolic heart failure    ESRD (end stage renal disease)    Myalgia due to statin    Hyperparathyroidism, secondary renal    CKD (chronic kidney disease), stage IV    Acute hypoxic respiratory failure    Morbid obesity    CHF (congestive heart failure)    Cardiac arrest    Goals of care, counseling/discussion    Acute on chronic combined systolic and diastolic heart failure    Atrial fibrillation with RVR    Hypoxic respiratory failure    ACP (advance care planning)    Palliative care encounter       Review of patient's allergies indicates:   Allergen Reactions    Percocet [oxycodone-acetaminophen] Itching    Amiodarone analogues      Itching      Irbesartan Swelling    Januvia [sitagliptin]     Jardiance [empagliflozin]      Leg cramps    Lipitor [atorvastatin] Other (See Comments)     Severe leg pain    Linaclotide Other (See Comments) and Nausea And Vomiting     Does not remember    Lubiprostone Other (See Comments) and Palpitations     Does not remember       Current Inpatient Medications:   aspirin  81 mg Oral Daily    carvediloL  12.5 mg Oral BID    gentamicin   Topical (Top) TID    heparin (porcine)  7,500 Units Subcutaneous Q8H       No current facility-administered medications on file prior to encounter.     Current Outpatient Medications on File Prior to Encounter   Medication Sig Dispense Refill    allopurinoL (ZYLOPRIM) 300 MG tablet Take 1 tablet (300 mg total) by mouth once daily. 90 tablet 3    calcitRIOL (ROCALTROL) 0.5 MCG Cap Take 0.5 mcg by mouth once daily.      carvediloL (COREG) 25 MG tablet Take 1 tablet (25 mg total) by mouth 2 (two) times daily. 180 tablet 2    cloNIDine 0.1 mg/24 hr td ptwk (CATAPRES) 0.1 mg/24 hr Place 1 patch onto the  skin every 7 days. 4 patch 4    ELIQUIS 5 mg Tab TAKE 1 TABLET(5 MG) BY MOUTH TWICE DAILY 180 tablet 0    evolocumab (REPATHA SURECLICK) 140 mg/mL PnIj Inject 1 mL (140 mg total) into the skin every 14 (fourteen) days. 2 mL 11    furosemide (LASIX) 40 MG tablet Take 40 mg by mouth once daily.      amLODIPine (NORVASC) 10 MG tablet Take 10 mg by mouth once daily.      blood sugar diagnostic (TRUE METRIX GLUCOSE TEST STRIP) Strp Use 1x daily. Insurance preferred. 100 strip 3    blood sugar diagnostic Strp To check BG 1 time daily, to use with insurance preferred meter 100 strip 3    cloNIDine (CATAPRES) 0.1 MG tablet Take 1 tablet (0.1 mg total) by mouth 3 (three) times daily as needed (PRN SBP > 165 mmHg). 90 tablet 6    cyanocobalamin (VITAMIN B-12) 100 MCG tablet Take 100 mcg by mouth once daily.      lancets Misc To check BG 1 times daily, to use with insurance preferred meter 100 each 3    loratadine (CLARITIN) 10 mg tablet Take 10 mg by mouth once daily.      [DISCONTINUED] aspirin (ECOTRIN) 81 MG EC tablet Take 1 tablet (81 mg total) by mouth once daily. 90 tablet 3    [DISCONTINUED] DULoxetine (CYMBALTA) 20 MG capsule TAKE ONE CAPSULE BY MOUTH ONCE DAILY 30 capsule 4    [DISCONTINUED] folic acid (FOLVITE) 1 MG tablet Take 1 mg by mouth once daily.      [DISCONTINUED] metFORMIN (GLUCOPHAGE-XR) 500 MG ER 24hr tablet Take 2 tablets (1,000 mg total) by mouth 2 (two) times daily with meals. 360 tablet 3    [DISCONTINUED] sodium bicarbonate 650 MG tablet Take 1 tablet (650 mg total) by mouth once daily. 30 tablet 11       Past Surgical History:   Procedure Laterality Date    AORTOGRAPHY N/A 12/04/2020    Procedure: Aortogram;  Surgeon: Paul Pedersen MD;  Location: [facility];  Service: Cardiology;  Laterality: N/A;    BREAST SURGERY      CARDIAC CATHETERIZATION  12/2020    CHOLECYSTECTOMY      COLONOSCOPY      multi -last 2014     CORONARY ARTERY BYPASS GRAFT      ESOPHAGOGASTRODUODENOSCOPY      2012     HAND  "SURGERY Right     INSERTION OF CATHETER N/A 2024    Procedure: Insertion,catheter;  Surgeon: Meli Griffin MD;  Location: [facility] OR;  Service: Vascular;  Laterality: N/A;  ORIGINAL CATHETER WAS REPOSITIONED.  NO NEW CATHETER WAS PLACED    INSERTION, CATHETER, DIALYSIS, PERITONEAL N/A 2024    Procedure: IN Insertion Catheter, Dialysis, Peritoneal - laparoscopic;  Surgeon: Rodriguez Catalan Jr., MD;  Location: [facility] OR;  Service: General;  Laterality: N/A;    LAPAROSCOPIC ROBOT-ASSISTED SURGICAL REMOVAL OF KIDNEY USING DA CHELEL XI Right 03/10/2022    Procedure: XI ROBOTIC NEPHRECTOMY- radical;  Surgeon: Rolando Ramirez MD;  Location: [facility] OR;  Service: Urology;  Laterality: Right;    MASTECTOMY W/ SENTINEL NODE BIOPSY Bilateral 2015    bilateral "dog ears"    NASAL SINUS SURGERY      Dr Bryant FESS/cauterization turbinate     PARTIAL HYSTERECTOMY      PERCUTANEOUS TRANSLUMINAL BALLOON ANGIOPLASTY OF CORONARY ARTERY  2020    Procedure: Angioplasty-coronary;  Surgeon: Paul Pedersen MD;  Location: [facility] CATH;  Service: Cardiology;;    RENAL BIOPSY Right     2021 EJ    TUBAL LIGATION      ULTRASOUND GUIDANCE  2020    Procedure: Ultrasound Guidance;  Surgeon: Paul Pedersen MD;  Location: [facility] CATH;  Service: Cardiology;;       Social History     Socioeconomic History    Marital status: Single    Number of children: 4   Occupational History    Occupation: retired Psych aid    Tobacco Use    Smoking status: Former     Current packs/day: 0.00     Types: Cigarettes     Start date: 2016     Quit date: 2016     Years since quittin.5    Smokeless tobacco: Never    Tobacco comments:     quit    Substance and Sexual Activity    Alcohol use: No    Drug use: No    Sexual activity: Not Currently     Partners: Male     Birth control/protection: None     Social Determinants of Health     Financial Resource Strain: Low Risk  (2024)    Overall Financial " Resource Strain (CARDIA)     Difficulty of Paying Living Expenses: Not very hard   Recent Concern: Financial Resource Strain - Medium Risk (6/20/2024)    Overall Financial Resource Strain (CARDIA)     Difficulty of Paying Living Expenses: Somewhat hard   Food Insecurity: No Food Insecurity (7/5/2024)    Hunger Vital Sign     Worried About Running Out of Food in the Last Year: Never true     Ran Out of Food in the Last Year: Never true   Recent Concern: Food Insecurity - Food Insecurity Present (6/20/2024)    Hunger Vital Sign     Worried About Running Out of Food in the Last Year: Sometimes true     Ran Out of Food in the Last Year: Never true   Transportation Needs: No Transportation Needs (7/5/2024)    TRANSPORTATION NEEDS     Transportation : No   Physical Activity: Inactive (7/5/2024)    Exercise Vital Sign     Days of Exercise per Week: 0 days     Minutes of Exercise per Session: 0 min   Stress: Patient Unable To Answer (7/5/2024)    [employer] - Occupational Stress Questionnaire     Feeling of Stress : Patient unable to answer   Housing Stability: Low Risk  (7/5/2024)    Housing Stability Vital Sign     Unable to Pay for Housing in the Last Year: No     Homeless in the Last Year: No       OBJECTIVE:     Vital Signs Range (Last 24H):  Temp:  [36.1 °C (97 °F)]   Pulse:  []   Resp:  [17-35]   BP: ()/()   SpO2:  [90 %-100 %]       Significant Labs:  Lab Results   Component Value Date    WBC 14.98 (H) 07/10/2024    HGB 10.9 (L) 07/10/2024    HCT 35.8 (L) 07/10/2024     07/10/2024    CHOL 120 01/22/2024    TRIG 71 01/22/2024    HDL 40 01/22/2024    ALT 67 (H) 07/10/2024    AST 65 (H) 07/10/2024     07/10/2024    K 4.3 07/10/2024     07/10/2024    CREATININE 6.9 (H) 07/10/2024    BUN 72 (H) 07/10/2024    CO2 18 (L) 07/10/2024    TSH 2.728 07/09/2024    INR 1.0 07/03/2024    GLUF 238 (H) 03/28/2005    HGBA1C 6.8 (H) 06/19/2024       Diagnostic Studies:  No relevant studies.    EKG:   Results for orders placed or performed during the hospital encounter of 06/18/24   EKG 12-lead    Collection Time: 07/01/24 10:26 AM   Result Value Ref Range    QRS Duration 140 ms    OHS QTC Calculation 422 ms    Narrative    Test Reason : R07.89,    Vent. Rate : 105 BPM     Atrial Rate : 000 BPM     P-R Int : 000 ms          QRS Dur : 140 ms      QT Int : 320 ms       P-R-T Axes : 000 -39 149 degrees     QTc Int : 422 ms    Atrial fibrillation with rapid ventricular response  Left axis deviation  LVH with QRS widening and repolarization abnormality ( R in aVL , [place]  product )  Abnormal ECG  When compared with ECG of 30-JUN-2024 23:17,  No significant change was found  Confirmed by Jacky BACA, Kvng Celeste ([id number]) on 7/4/2024 12:25:17 PM    Referred By: [email or web address]   SELF           Confirmed By:Kvng Rico MD       2D ECHO:  TTE:  Results for orders placed or performed during the hospital encounter of 07/03/24   Echo   Result Value Ref Range    RA Width 4.57 cm    LA volume (mod) 71.24 cm3    Left Atrium Major Axis 6.52 cm    Left Atrium Minor Axis 6.53 cm    RA Major Axis 6.28 cm    LV Diastolic Volume 169.89 mL    LV Systolic Volume 124.41 mL    PV Peak D Harley 0.23 m/s    PV Peak S Harley 0.14 m/s    MV stenosis pressure 1/2 time 53.56 ms    TR Max Harley 3.39 m/s    MV Peak E Harley 1.19 m/s    Mr max harley 0.04 m/s    Ao VTI 19.89 cm    Ao peak harley 1.21 m/s    LVOT peak VTI 14.22 cm    LVOT peak harley 0.89 m/s    LVOT diameter 2.06 cm    IVRT 114.18 msec    E wave deceleration time 184.67 msec    AV mean gradient 3 mmHg    RV- boothe basal diam 4.4 cm    TAPSE 1.29 cm    LA size 4.90 cm    Ascending aorta 3.04 cm    STJ 2.64 cm    Sinus 3.16 cm    LVIDs 5.11 (A) 2.1 - 4.0 cm    Posterior Wall 1.01 0.6 - 1.1 cm    IVS 0.92 0.6 - 1.1 cm    LVIDd 5.85 3.5 - 6.0 cm    TDI LATERAL 0.08 m/s    LA WIDTH 4.78 cm    TDI SEPTAL 0.05 m/s    LV LATERAL E/E' RATIO 14.88 m/s    LV SEPTAL E/E' RATIO  23.80 m/s    FS 13 (A) 28 - 44 %    LA volume 129.90 cm3    LV mass 225.83 g    Left Ventricle Relative Wall Thickness 0.35 cm    AV valve area 2.38 cm²    AV Velocity Ratio 0.74     AV index (prosthetic) 0.71     MV valve area p 1/2 method 4.11 cm2    Mean e' 0.07 m/s    Pulm vein S/D ratio 0.61     LVOT area 3.3 cm2    LVOT stroke volume 47.37 cm3    AV peak gradient 6 mmHg    E/E' ratio 18.31 m/s    Triscuspid Valve Regurgitation Peak Gradient 46 mmHg    MARKO by Velocity Ratio 2.45 cm²    BSA 2.54 m2    LV Systolic Volume Index 51.6 mL/m2    LV Diastolic Volume Index 70.49 mL/m2    LV Mass Index 94 g/m2    LA Volume Index 53.9 mL/m2    LA Volume Index (Mod) 29.6 mL/m2    ZLVIDS -2.13     ZLVIDD -6.48     TV resting pulmonary artery pressure 61 mmHg    RV TB RVSP 18 mmHg    Est. RA pres 15 mmHg    Narrative      Left Ventricle: The left ventricle is moderately dilated. Normal wall   thickness. Global hypokinesis present. There is severely reduced systolic   function with a visually estimated ejection fraction of 20 - 25%.    Right Ventricle: Mild right ventricular enlargement. Wall thickness is   normal. Right ventricle wall motion has global hypokinesis. Systolic   function is moderately reduced.    Left Atrium: Left atrium is severely dilated.    Right Atrium: Right atrium is severely dilated.    Aortic Valve: The aortic valve is a trileaflet valve. There is mild   aortic valve sclerosis.    Mitral Valve: There is mild regurgitation.    Tricuspid Valve: There is severe regurgitation.    Pulmonary Artery: There is moderate pulmonary hypertension. The   estimated pulmonary artery systolic pressure is 61 mmHg.    IVC/SVC: Elevated venous pressure at 15 mmHg.         MILLY:  No results found for this or any previous visit.    ASSESSMENT/PLAN:           Pre-op Assessment    I have reviewed the Patient Summary Reports.     I have reviewed the Nursing Notes. I have reviewed the NPO Status.   I have reviewed the  Medications.     Review of Systems  Anesthesia Hx:  No problems with previous Anesthesia   History of prior surgery of interest to airway management or planning:          Denies Family Hx of Anesthesia complications.    Denies Personal Hx of Anesthesia complications.                    Hematology/Oncology:  Hematology Normal                                     EENT/Dental:  EENT/Dental Normal           Cardiovascular:     Hypertension   CAD   CABG/stent    CHF                                 Pulmonary:      Shortness of breath  Sleep Apnea                Renal/:  Chronic Renal Disease, CKD, ESRD                Hepatic/GI:  Hepatic/GI Normal                 Musculoskeletal:  Musculoskeletal Normal                Neurological:  Neurology Normal                                      Endocrine:  Diabetes, type 2 Hypothyroidism              Physical Exam  General: Confusion    Airway:  Mallampati: II   Mouth Opening: Normal  TM Distance: Normal  Tongue: Normal  Neck ROM: Normal ROM    Dental:  Periodontal disease        Anesthesia Plan  Type of Anesthesia, risks & benefits discussed:    Anesthesia Type: Gen ETT, Gen Supraglottic Airway, Gen Natural Airway, MAC  Intra-op Monitoring Plan: Standard ASA Monitors and Art Line  Post Op Pain Control Plan: multimodal analgesia and IV/PO Opioids PRN  Induction:  IV  Airway Plan: Direct and Video, Post-Induction  Informed Consent: Informed consent signed with the Patient and all parties understand the risks and agree with anesthesia plan.  All questions answered.   ASA Score: 4  Day of Surgery Review of History & Physical: H&P Update referred to the surgeon/provider.    Ready For Surgery From Anesthesia Perspective.     .

## 2024-07-10 NOTE — PROGRESS NOTES
07/10/24 1630   Peritoneal Dialysis   Exchange Type Manual   Peritoneal Treatment Status Completed   Manual Peritoneal Dialysis   Manual Drain Volume (mL) 700 mL   Effluent Appearance Yellow   Manual Treatment Comments capd completed     CAPD manual exchange completed. Cell count and fluid studies collected- will send to lab. Pt line clamped and betadine cap placed aseptically.

## 2024-07-10 NOTE — PROGRESS NOTES
07/10/24 1614 07/10/24 1627   Treatment   Treatment Type SLED SLED   Treatment Status Clotting;Blood returned Restart   Dialyzer Time (hours) 1.45  --    BVP (Liters) 15.4 L  --    CRRT Hourly Documentation   Blood Flow (mL/min)  --  150   UF Rate  --  300 cc/hr   Arterial Pressure (mmHg)  --  -40 mmHg   Venous Pressure (mmHg)  --  60 mmHg   Effluent Pressure (EP) (mmHg)  --  30 mmHg   Total UF (Hourly Cleared) (mL)  --  0     Blood returned by primary RN d/t clotting. SLED restarted.

## 2024-07-10 NOTE — CARE UPDATE
Interventional Nephrology:    This lady is scheduled for cuffed tunneled HD catheter tomorrow. Please send PT and PTT, keep her NPO and hold Heparin injection from tonight.     If yo have any question please feel free to call.       PENNY MOLINA.Alexandro. MD. ALISSA. JORDY.  , [employer] [facility] [employer]  Nephrology Consultant. [facility].   [facility] [facility]. 5th [place] [address].    email: [email or web address]  Tel: Office: [phone]

## 2024-07-10 NOTE — PROGRESS NOTES
07/10/24 1315   Peritoneal Dialysis   Exchange Type Manual   Peritoneal Treatment Status Started   Dianeal Solution Dextrose 1.5% in 2000 mL   Manual Peritoneal Dialysis   Manual Fill Volume (mL) 1000 mL   Manual Treatment Comments capd manual exchange started     CAPD manual exchange started. Unable to drain any effluent at this time. Instilled 1 liter 1.5% dextrose solution as ordered.

## 2024-07-10 NOTE — PROGRESS NOTES
[facility] - Cardiac Medical ICU  Critical Care Medicine  Progress Note    Patient Name: Juhi Taylor  MRN: [id number]  Admission Date: 7/3/2024  Hospital Length of Stay: 7 days  Code Status: Full Code  Attending Provider: Messi Be*  Primary Care Provider: Tony Sadler MD   Principal Problem: Hypoxic respiratory failure    Subjective:     HPI:  Juhi Taylor is a 74-year-old female with a past medical history of CKD stage 5 with recent PD catheter placement on 6/4, RCC s/p right nephrectomy, T2DM, obesity, CAD s/p PCI to Lcx and LAD in 11/2020 and HFmREF who is admitted as a transfer from [facility] for higher level of care and further cardiac evaluation. Nephrology consulted for hemodialysis. She initially presented on 6/18/24 to [facility] for hypervolemia in the setting of CKD 4-5 for which she had undergone elective PD catheter placement complicated by catheter site leaking. She had a complex hospital course, was initially placed on furosemide gtt without significant improvement and later underwent placement of tunneled line for HD with volume removal. During her second dialysis session she went into PEA arrest for which she was resuscitated, intubated and transferred to ICU. She was noted to have elevated troponin and subsequent TTE found reduced EF from 40-45% to 20-25% for which coronary angiography was recommended. Her hospital course was also complicated by dialysis line infection, significant bleeding around the catheter site, bradycardia and subsequent atrial fibrillation with RVR. Given the catheter site bleeding, anticoagulation was temporarily held. She has reported allergy to amiodarone, was initially placed on diltiazem for atrial fibrillation despite reduced EF. She discussed GOC at outside hospital where it appears that she lacked capacity per note and decided to pursue higher level of care at [facility]. At that time, gen surgery was planning to place a left tunneled catheter due  to concerns of the right side not being suitable     Upon admit, patient appeared drowsy but conversant. Daughter reports that she typically gets lethargic 2/2 to the volume overload due to not receiving dialysis. Patient reported b/l left lower extremity pain. Daughter concerned about her not having dialysis in 4 days and wants a session today.     Pt initially refusing dialysis. Seen by nephrology. VBG 7.220/33.4/34/13.7/-14. Critical care medicine consulted for emergent trialysis line placement and dialysis.     Hospital/ICU Course:  Ms. Taylor was admitted to MICU on 7/8 for urgent dialysis line placement and initiated of HD. Patient received short run of CRRT overnight without issue. Encephalopathy improving with continued CRRT treatments. Persistent AFib with RVR, received Digoxin loading dose (renally dosed), restarted PO Carvedilol.     Interval History/Significant Events: Mentation improving, now oriented to self and able to answer simple questions.     Review of Systems   Respiratory:  Negative for shortness of breath.    Cardiovascular:  Negative for chest pain.   Gastrointestinal:  Positive for abdominal pain.     Objective:     Vital Signs (Most Recent):  Temp:  (Refused temp check.) (07/10/24 0301)  Pulse: (!) 121 (07/10/24 1100)  Resp: 19 (07/10/24 1100)  BP: (!) 131/91 (07/10/24 1100)  SpO2: (!) 93 % (07/10/24 1100) Vital Signs (24h Range):  Temp:  [97 °F (36.1 °C)] 97 °F (36.1 °C)  Pulse:  [] 121  Resp:  [17-35] 19  SpO2:  [90 %-100 %] 93 %  BP: ()/() 131/91   Weight: 128.5 kg (283 lb 4.7 oz)  Body mass index is 44.37 kg/m².      Intake/Output Summary (Last 24 hours) at 7/10/2024 1356  Last data filed at 7/10/2024 1335  Gross per 24 hour   Intake 805.62 ml   Output 749 ml   Net 56.62 ml          Physical Exam  Vitals and nursing note reviewed.   Constitutional:       Appearance: She is obese. She is ill-appearing.   HENT:      Head: Normocephalic.      Mouth/Throat:      Mouth:  Mucous membranes are moist.   Eyes:      Pupils: Pupils are equal, round, and reactive to light.   Cardiovascular:      Rate and Rhythm: Tachycardia present. Rhythm irregular.   Pulmonary:      Effort: No accessory muscle usage or respiratory distress.      Breath sounds: Decreased breath sounds present.   Abdominal:      General: Bowel sounds are normal. There is no distension.      Palpations: Abdomen is soft.      Tenderness: There is no abdominal tenderness.      Comments: Peritoneal catheter in palce   Musculoskeletal:      Right lower leg: Edema present.      Left lower leg: Edema present.   Skin:     General: Skin is warm.   Neurological:      General: No focal deficit present.      Mental Status: She is lethargic and disoriented.      GCS: GCS eye subscore is 4. GCS verbal subscore is 5. GCS motor subscore is 6.      Comments: Oriented to self            Vents:  Oxygen Concentration (%): 28 (07/05/24 2320)  Lines/Drains/Airways       Central Venous Catheter Line  Duration             Trialysis (Dialysis) Catheter 07/08/24 2210 left internal jugular 1 day              Peripheral Intravenous Line  Duration                  Peripheral IV - Single Lumen 07/08/24 1023 20 G 1 3/4 in Anterior;Right Forearm 2 days                  Significant Labs:    CBC/Anemia Profile:  Recent Labs   Lab 07/09/24  0325 07/10/24  0306   WBC 12.78* 14.98*   HGB 10.4* 10.9*   HCT 33.2* 35.8*    254   MCV 83 85   RDW 21.1* 21.3*        Chemistries:  Recent Labs   Lab 07/09/24  0325 07/09/24  1326 07/09/24  1815 07/09/24  2056 07/10/24  0306    139 136 136 136   K 4.4 4.7 4.5 4.5 4.3    104 104 105 104   CO2 19* 20* 19* 19* 18*   BUN 90* 87* 64* 69* 72*   CREATININE 7.3* 7.6* 5.5* 6.5* 6.9*   CALCIUM 8.7 9.2 8.2* 8.6* 8.6*   ALBUMIN 2.6* 2.7* 2.7* 2.7* 2.6*   PROT 5.4*  --   --   --  5.6*   BILITOT 1.4*  --   --   --  1.4*   ALKPHOS 153*  --   --   --  176*   ALT 65*  --   --   --  67*   AST 84*  --   --   --  65*    MG 2.3 2.4  --  2.1 2.2   PHOS 7.0* 7.5* 5.1* 5.8* 6.2*       All pertinent labs within the past 24 hours have been reviewed.    Significant Imaging:  I have reviewed all pertinent imaging results/findings within the past 24 hours.    ABG  Recent Labs   Lab 07/09/24  0904   PH 7.281*   PO2 42   PCO2 47.0*   HCO3 22.1*   BE -5*     Assessment/Plan:     Pulmonary  * Hypoxic respiratory failure  -- Resolved     Cardiac/Vascular  Atrial fibrillation with RVR  AFib with RVR appears new following cardiac arrest at OSH. Per chart review, AFib was difficult to control. Patient endorses amiodarone reaction with itching, rash, and oral burning. Refused Sotalol, DCCV, and AICD placement at OSH. Was placed on Diltizem infusion and transitioned to PO metoprolol.     -- Unable to take PO Metoprolol due to encephalopathy  -- AFib persistent despite IV lopressor administration  -- Daily CMP  -- Keep K>4, Mag >2  -- Renally does with Digoxin on 7/9 with therapeutic level on 7/10.  -- Carvedilol restarted    Acute on chronic combined systolic and diastolic heart failure  Echo    Left Ventricle: The left ventricle is moderately dilated. Normal wall thickness. Global hypokinesis present. There is severely reduced systolic function with a visually estimated ejection fraction of 20 - 25%.    Right Ventricle: Mild right ventricular enlargement. Wall thickness is normal. Right ventricle wall motion has global hypokinesis. Systolic function is moderately reduced.    Left Atrium: Left atrium is severely dilated.    Right Atrium: Right atrium is severely dilated.    Aortic Valve: The aortic valve is a trileaflet valve. There is mild aortic valve sclerosis.    Mitral Valve: There is mild regurgitation.    Tricuspid Valve: There is severe regurgitation.    Pulmonary Artery: There is moderate pulmonary hypertension. The estimated pulmonary artery systolic pressure is 61 mmHg.    IVC/SVC: Elevated venous pressure at 15 mmHg.    Pericardium:  There is a trivial circumferential effusion. No indication of cardiac tamponade.        Essential hypertension  - Carvedilol restarted    Renal/  CKD stage 4 secondary to hypertension  Pt previously on PD. Not unable to remove enough volume with PD catheter.     --Nephrology following, appreciate recs  --Transfer to MICU for emergent trialysis placement and emergent dialysis, now on CRRT    Endocrine  Type 2 diabetes mellitus with microalbuminuria, without long-term current use of insulin  --POC glucose  --Hypoglycemia protocol    Palliative Care  Palliative care encounter  --Palliative care following, continued goals of care with family    ACP (advance care planning)  - Palliative care consulted, appreciate their assistance in clarification of GOC/POC with patient and family  - Family to hopeful patient's mentation will improve and patient will be able to participate in GOC discussions       Critical Care Daily Checklist:    A: Awake: RASS Goal/Actual Goal:  0  Actual:  -1   B: Spontaneous Breathing Trial Performed?  N/A   C: SAT & SBT Coordinated?  N/A                      D: Delirium: CAM-ICU Overall CAM-ICU: Positive   E: Early Mobility Performed? No   F: Feeding Goal:    Status:     Current Diet Order   Procedures    Diet NPO Except for: Medication, Sips with Medication     Order Specific Question:   Except for     Answer:   Medication     Order Specific Question:   Except for     Answer:   Sips with Medication      AS: Analgesia/Sedation PRN Tylenol    T: Thromboembolic Prophylaxis Subcut heparin    H: HOB > 300 Yes   U: Stress Ulcer Prophylaxis (if needed)    G: Glucose Control Goal 140-180   B: Bowel Function Stool Occurrence: 1   I: Indwelling Catheter (Lines & Damon) Necessity Trialysis (7/8)  Peritoneal HD Catheter   D: De-escalation of Antimicrobials/Pharmacotherapies     Plan for the day/ETD CRRT per nephrology     Code Status:  Family/Goals of Care: Full Code  DaughterNell, updated on POC at  bedside     Critical Care Time: 60 minutes  Critical secondary to Patient has a condition that poses threat to life and bodily function: ESRD on CRRT, Encephalopathy       Critical care was time spent personally by me on the following activities: development of treatment plan with patient or surrogate and bedside caregivers, discussions with consultants, evaluation of patient's response to treatment, examination of patient, ordering and performing treatments and interventions, ordering and review of laboratory studies, ordering and review of radiographic studies, pulse oximetry, re-evaluation of patient's condition. This critical care time did not overlap with that of any other provider or involve time for any procedures.     Desiree Painter, NIXON  Critical Care Medicine  [facility] - Cardiac Medical ICU

## 2024-07-10 NOTE — ASSESSMENT & PLAN NOTE
AFib with RVR appears new following cardiac arrest at OSH. Per chart review, AFib was difficult to control. Patient endorses amiodarone reaction with itching, rash, and oral burning. Refused Sotalol, DCCV, and AICD placement at OSH. Was placed on Diltizem infusion and transitioned to PO metoprolol.     -- Unable to take PO Metoprolol due to encephalopathy  -- AFib persistent despite IV lopressor administration  -- Daily CMP  -- Keep K>4, Mag >2  -- Renally does with Digoxin on 7/9 with therapeutic level on 7/10.  -- Carvedilol restarted

## 2024-07-10 NOTE — PLAN OF CARE
MICU DAILY GOALS     Family/Goals of care/Code Status   Code Status: Full Code    24H Vital Sign Range  Temp:  [97 °F (36.1 °C)]   Pulse:  []   Resp:  [17-35]   BP: (109-167)/()   SpO2:  [90 %-100 %]      Shift Events (include procedures and significant events)   CRRT started. SLP eval completed; Recs Ice chips and crushed meds in pudding/apple sauce.    AWAKE RASS: Goal -    Actual - RASS (Jha Agitation-Sedation Scale): agitated    Restraint necessity: Not necessary   BREATHE SBT: Not intubated    Coordinate A & B, analgesics/sedatives Pain: managed   SAT: Not intubated   Delirium CAM-ICU: Overall CAM-ICU: Positive   Early(intubated/ Progressive (non-intubated) Mobility MOVE Screen (INTUBATED ONLY): Not intubated    Activity: Activity Management: Rolling - L1   Feeding/Nutrition Diet order: Diet/Nutrition Received: NPO, ice chips, Specialty Diet/Nutrition Received: renal diet   Thrombus DVT prophylaxis: VTE Required Core Measure: Pharmacological prophylaxis initiated/maintained   HOB Elevation Head of Bed (HOB) Positioning: HOB at 30-45 degrees   Ulcer Prophylaxis GI: yes   Glucose control managed Glycemic Management: blood glucose monitored   Skin Skin assessed during: Daily Assessment    Sacrum intact/not altered? Yes  Heels intact/not altered? Yes  Surgical wound? Yes    CHECK ONE!   (no altered skin or altered skin) and sub boxes:  [] No Altered Skin Integrity Present    []Prevention Measures Documented    [x] Altered Skin Integrity Present or Discovered   [x] LDA present in EPIC, daily doc completed              [] LDA added if not in EPIC (describe wound).                    When describing wound, do not stage, use descriptive words only.    [] Wound Image Taken (required on admit,                   transfer/discharge and every Tuesday)    Wound Care Consulted? Yes    4 EYES:  Attending Nurse (1st set of eyes): NEENA Sánchez    Second RN/Staff Member (2nd set of eyes): NEENA Penn   Bowel Function  no issues    Indwelling Catheter Necessity      Trialysis (Dialysis) Catheter 07/08/24 2210 left internal jugular-Line Necessity Review: CRRT/HD  Reviewed   De-escalation Antibiotics No        VS and assessment per flow sheet, patient progressing towards goals as tolerated, plan of care reviewed with family, all concerns addressed, will continue to monitor.

## 2024-07-10 NOTE — PLAN OF CARE
MICU DAILY GOALS     Family/Goals of care/Code Status   Code Status: Full Code    24H Vital Sign Range  Temp:  [97 °F (36.1 °C)-97.8 °F (36.6 °C)]   Pulse:  []   Resp:  [16-32]   BP: ()/()   SpO2:  [90 %-100 %]      Shift Events (include procedures and significant events)   Pt more alert and talkative this shift. Responds to verbal command. Has been saying profanities and refusing primary nurse to replace pulse oximeter, check temperatures, check blood glucoses. Have attempted to redirect and educate pt, but unsuccessful. Notified critical care team to inform. Pt is calm when resting and left alone. Bed alarm is on. Daughter at bedside. Will continue to monitor closely.    AWAKE RASS: Goal -    Actual - RASS (Jha Agitation-Sedation Scale): agitated    Restraint necessity: Not necessary   BREATHE SBT: Not intubated    Coordinate A & B, analgesics/sedatives Pain: managed   SAT: Not intubated   Delirium CAM-ICU: Overall CAM-ICU: Positive   Early(intubated/ Progressive (non-intubated) Mobility MOVE Screen (INTUBATED ONLY): Not intubated    Activity: Activity Management: Rolling - L1   Feeding/Nutrition Diet order: Diet/Nutrition Received: NPO, Specialty Diet/Nutrition Received: renal diet   Thrombus DVT prophylaxis: VTE Required Core Measure: Pharmacological prophylaxis initiated/maintained   HOB Elevation Head of Bed (HOB) Positioning: HOB at 30-45 degrees   Ulcer Prophylaxis GI: yes   Glucose control managed Glycemic Management: blood glucose monitored   Skin Skin assessed during: Daily Assessment    Sacrum intact/not altered? Yes  Heels intact/not altered? Yes  Surgical wound?     CHECK ONE!   (no altered skin or altered skin) and sub boxes:  [] No Altered Skin Integrity Present    []Prevention Measures Documented    [x] Altered Skin Integrity Present or Discovered   [] LDA present in EPIC, daily doc completed              [] LDA added if not in EPIC (describe wound).                    When  describing wound, do not stage, use descriptive words only.    [] Wound Image Taken (required on admit,                   transfer/discharge and every Tuesday)    Wound Care Consulted? No    4 EYES:  Attending Nurse (1st set of eyes):     Second RN/Staff Member (2nd set of eyes):    Bowel Function no issues    Indwelling Catheter Necessity      Trialysis (Dialysis) Catheter 07/08/24 2210 left internal jugular-Line Necessity Review: CRRT/HD     De-escalation Antibiotics Yes        VS and assessment per flow sheet, patient progressing towards goals as tolerated, plan of care reviewed with family, all concerns addressed, will continue to monitor.

## 2024-07-10 NOTE — ASSESSMENT & PLAN NOTE
Pt previously on PD. Not unable to remove enough volume with PD catheter.     --Nephrology following, appreciate recs  --Transfer to MICU for emergent trialysis placement and emergent dialysis, now on CRRT

## 2024-07-11 ENCOUNTER — ANESTHESIA (OUTPATIENT)
Dept: MEDSURG UNIT | Facility: HOSPITAL | Age: 74
End: 2024-07-11
Payer: MEDICARE

## 2024-07-11 PROCEDURE — 25000003 PHARM REV CODE 250

## 2024-07-11 RX ORDER — PHENYLEPHRINE HCL IN 0.9% NACL 1 MG/10 ML
SYRINGE (ML) INTRAVENOUS
Status: DISCONTINUED | OUTPATIENT
Start: 2024-07-11 | End: 2024-07-11

## 2024-07-11 RX ADMIN — Medication 100 MCG: at 10:07

## 2024-07-11 RX ADMIN — SODIUM CHLORIDE: 9 INJECTION, SOLUTION INTRAVENOUS at 10:07

## 2024-07-11 NOTE — TRANSFER OF CARE
"Anesthesia Transfer of Care Note    Patient: Juhi Taylor    Procedure(s) Performed: Procedure(s) (LRB):  Insertion, Catheter, Central Venous, Hemodialysis (Right)  REMOVAL, CATHETER, HEMODIALYSIS    Patient location: ICU    Anesthesia Type: general and MAC    Transport from OR: Transported from OR on 6-10 L/min O2 by face mask with adequate spontaneous ventilation    Post pain: adequate analgesia    Post assessment: no apparent anesthetic complications and tolerated procedure well    Post vital signs: stable    Level of consciousness: awake, alert and oriented    Nausea/Vomiting: no nausea/vomiting    Complications: none    Transfer of care protocol was followed      Last vitals: Visit Vitals  /85   Pulse 104   Temp 36.9 °C (98.5 °F) (Axillary)   Resp (!) 22   Ht 5' 7" (1.702 m)   Wt 128.5 kg (283 lb 4.7 oz)   SpO2 100%   Breastfeeding No   BMI 44.37 kg/m²     "

## 2024-07-11 NOTE — SUBJECTIVE & OBJECTIVE
Interval History: Mentation minimally improved since yesterday, d/w her regarding dialysis, she agrees to procedure this am. No family at bedside at time of interview. No major complaints at this time.     Review of patient's allergies indicates:   Allergen Reactions    Percocet [oxycodone-acetaminophen] Itching    Amiodarone analogues      Itching      Irbesartan Swelling    Januvia [sitagliptin]     Jardiance [empagliflozin]      Leg cramps    Lipitor [atorvastatin] Other (See Comments)     Severe leg pain    Linaclotide Other (See Comments) and Nausea And Vomiting     Does not remember    Lubiprostone Other (See Comments) and Palpitations     Does not remember     Current Facility-Administered Medications   Medication Frequency    0.9%  NaCl infusion (CRRT USE ONLY) Continuous    acetaminophen tablet 650 mg Q6H PRN    albuterol sulfate nebulizer solution 2.5 mg Q4H PRN    aspirin EC tablet 81 mg Daily    carvediloL tablet 25 mg BID    dextromethorphan-guaiFENesin  mg/5 ml liquid 5 mL Q4H PRN    dextrose 10% bolus 125 mL 125 mL PRN    dextrose 10% bolus 250 mL 250 mL PRN    gentamicin 0.1 % ointment TID    glucagon (human recombinant) injection 1 mg PRN    glucose chewable tablet 16 g PRN    glucose chewable tablet 24 g PRN    magnesium sulfate 2g in water 50mL IVPB (premix) PRN    naloxone 0.4 mg/mL injection 0.02 mg PRN    ondansetron injection 4 mg Q6H PRN    simethicone chewable tablet 80 mg TID PRN    sodium chloride 0.9% flush 10 mL PRN    sodium phosphate 20.01 mmol in D5W 250 mL IVPB PRN    sodium phosphate 30 mmol in D5W 250 mL IVPB PRN    sodium phosphate 39.99 mmol in D5W 250 mL IVPB PRN       Objective:     Vital Signs (Most Recent):  Temp: 98.5 °F (36.9 °C) (07/11/24 0700)  Pulse: 97 (07/11/24 0700)  Resp: (!) 21 (07/11/24 0700)  BP: (!) 131/99 (07/11/24 0700)  SpO2: 100 % (07/11/24 0700) Vital Signs (24h Range):  Temp:  [98 °F (36.7 °C)-98.5 °F (36.9 °C)] 98.5 °F (36.9 °C)  Pulse:  []  97  Resp:  [10-30] 21  SpO2:  [88 %-100 %] 100 %  BP: (110-172)/(64-99) 131/99     Weight: 128.5 kg (283 lb 4.7 oz) (07/08/24 2000)  Body mass index is 44.37 kg/m².  Body surface area is 2.46 meters squared.    I/O last 3 completed shifts:  In: 2734.6 [I.V.:2505.4; IV Piggyback:229.2]  Out: 2839 [Other:2839]     Physical Exam  Constitutional:       General: She is not in acute distress.     Appearance: Normal appearance. She is obese.   HENT:      Head: Normocephalic and atraumatic.      Mouth/Throat:      Mouth: Mucous membranes are moist.      Pharynx: Oropharynx is clear.   Cardiovascular:      Rate and Rhythm: Normal rate and regular rhythm.      Heart sounds: Normal heart sounds.   Pulmonary:      Effort: Pulmonary effort is normal.      Breath sounds: Normal breath sounds.   Abdominal:      Palpations: Abdomen is soft.   Musculoskeletal:      Right lower leg: Edema (2+) present.      Left lower leg: Edema (2+) present.   Skin:     General: Skin is warm and dry.   Neurological:      General: No focal deficit present.      Mental Status: She is alert. She is disoriented.      Motor: Weakness present.          Significant Labs:  All labs within the past 24 hours have been reviewed.     Significant Imaging:  All imaging with the past 24 hours have been reviewed.

## 2024-07-11 NOTE — INTERVAL H&P NOTE
The patient has been examined and the H&P has been reviewed:    I concur with the findings and no changes have occurred since H&P was written.    Procedure risks, benefits and alternative options discussed and understood by patient/family.          Active Hospital Problems    Diagnosis  POA    *Hypoxic respiratory failure [J96.91]  Yes     73F w/ ESRD hx RCC s/p R nephrectomy, HFrEF, CAD s/p PCI, paroxysmal Atrial fibrillation, T2DM obesity, and initial outside hospitalization for PD catheter malfunction and ADHF iso of volume overload complicated by PEA arrest with conversion to VT after epi and cardioversion => ROSC and recurrent AF w/ RVR.    Respiratory failure precipitated by volume overload 2/2 failed dialysis access and HFrEF complicated by PEA arrest during dialysis session at [facility]. EF following arrest 20%, previously 40%. Echo done at [facility] remains 20% with venous pressure 15mmHg. PD catheter tested by nephrology with success, currently on nightly PD. New trialysis line if volume status not adequately corrected with PD.            ACP (advance care planning) [Z71.89]  Not Applicable    Palliative care encounter [Z51.5]  Not Applicable    Acute on chronic combined systolic and diastolic heart failure [I50.43]  Yes    Atrial fibrillation with RVR [I48.91]  Yes    CKD stage 4 secondary to hypertension [I12.9, N18.4]  Yes    Severe obesity (BMI >= 40) [E66.01]  Yes    Essential hypertension [I10]  Yes    Type 2 diabetes mellitus with microalbuminuria, without long-term current use of insulin [E11.29, R80.9]  Yes     Chronic      Resolved Hospital Problems    Diagnosis Date Resolved POA    Severe sepsis [A41.9, R65.20] 07/08/2024 Yes    GERD (gastroesophageal reflux disease) [K21.9] 07/03/2024 Yes    Lower leg edema [R60.0] 07/03/2024 Yes

## 2024-07-11 NOTE — PROGRESS NOTES
07/10/24 2040   Treatment   Treatment Type SLED   Treatment Status Restart   Dialysis Machine Number [id number]   Dialyzer Time (hours) 0   BVP (Liters) 0 L   Solutions Labeled and Current  Yes   Access Temporary Cath   Catheter Dressing Intact  Yes   Alarms Engaged Yes   CRRT Comments CRRT RST   $ CRRT Charges   $ CRRT Charges Restart     Report received from primary RN. CRRT restarted per MD order.

## 2024-07-11 NOTE — ASSESSMENT & PLAN NOTE
AFib with RVR appears new following cardiac arrest at OSH. Per chart review, AFib was difficult to control. Patient endorses amiodarone reaction with itching, rash, and oral burning. Refused Sotalol, DCCV, and AICD placement at OSH. Was placed on Diltizem infusion and transitioned to PO metoprolol.     -- AFib persistent despite IV lopressor administration  -- Daily CMP  -- Keep K>4, Mag >2  -- Renally does with Digoxin on 7/9 with therapeutic level on 7/10.  -- Carvedilol restarted and uptitrated  -- Started on Amiodarone infusion on 7/11 for persistent AFib RVR with HR in 150s

## 2024-07-11 NOTE — PLAN OF CARE
MICU DAILY GOALS     Family/Goals of care/Code Status   Code Status: Full Code    24H Vital Sign Range  Temp:  [98 °F (36.7 °C)-98.2 °F (36.8 °C)]   Pulse:  [102-145]   Resp:  [10-35]   BP: (110-172)/()   SpO2:  [88 %-100 %]      Shift Events (include procedures and significant events)   Patient remained hemodynamically stable on  CRRT overnight.     AWAKE RASS: Goal - 0   Actual - RASS (Jha Agitation-Sedation Scale): drowsy    Restraint necessity: Not necessary   BREATHE SBT: Not intubated    Coordinate A & B, analgesics/sedatives Pain: managed   SAT: Not intubated   Delirium CAM-ICU: Overall CAM-ICU: Positive   Early(intubated/ Progressive (non-intubated) Mobility MOVE Screen (INTUBATED ONLY): Not intubated    Activity: Activity Management: Patient unable to perform activities   Feeding/Nutrition Diet order: Diet/Nutrition Received: NPO, ice chips, Specialty Diet/Nutrition Received: renal diet   Thrombus DVT prophylaxis: VTE Required Core Measure: Pharmacological prophylaxis initiated/maintained   HOB Elevation Head of Bed (HOB) Positioning: HOB at 30 degrees   Ulcer Prophylaxis GI: no   Glucose control managed Glycemic Management: blood glucose monitored   Skin Skin assessed during: Q Shift Change    Sacrum intact/not altered? Yes  Heels intact/not altered? Yes  Surgical wound? No    CHECK ONE!   (no altered skin or altered skin) and sub boxes:  [] No Altered Skin Integrity Present    []Prevention Measures Documented    [x] Altered Skin Integrity Present or Discovered   [x] LDA present in EPIC, daily doc completed              [] LDA added if not in EPIC (describe wound).                    When describing wound, do not stage, use descriptive words only.    [] Wound Image Taken (required on admit,                   transfer/discharge and every Tuesday)    Wound Care Consulted? No    4 EYES:  Attending Nurse (1st set of eyes):     Second RN/Staff Member (2nd set of eyes):    Bowel Function diarrhea     Indwelling Catheter Necessity      Trialysis (Dialysis) Catheter 07/08/24 2210 left internal jugular-Line Necessity Review: CRRT/HD     De-escalation Antibiotics No        VS and assessment per flow sheet, patient progressing towards goals as tolerated, plan of care reviewed with family, all concerns addressed, will continue to monitor.

## 2024-07-11 NOTE — SUBJECTIVE & OBJECTIVE
Interval History/Significant Events: Amiodarone infusion initiated overnight for AFib with RVR with HR persistently in the 150s.     Review of Systems   Respiratory:  Negative for shortness of breath.    Cardiovascular:  Negative for chest pain.   Gastrointestinal:  Positive for abdominal pain.     Objective:     Vital Signs (Most Recent):  Temp: 98.5 °F (36.9 °C) (07/11/24 0700)  Pulse: 97 (07/11/24 0700)  Resp: (!) 21 (07/11/24 0700)  BP: (!) 131/99 (07/11/24 0700)  SpO2: 100 % (07/11/24 0700) Vital Signs (24h Range):  Temp:  [98 °F (36.7 °C)-98.5 °F (36.9 °C)] 98.5 °F (36.9 °C)  Pulse:  [] 97  Resp:  [10-30] 21  SpO2:  [88 %-100 %] 100 %  BP: (110-172)/(64-99) 131/99   Weight: 128.5 kg (283 lb 4.7 oz)  Body mass index is 44.37 kg/m².      Intake/Output Summary (Last 24 hours) at 7/11/2024 0957  Last data filed at 7/11/2024 0600  Gross per 24 hour   Intake 2680.24 ml   Output 2839 ml   Net -158.76 ml          Physical Exam  Vitals and nursing note reviewed.   Constitutional:       Appearance: She is obese. She is ill-appearing.   HENT:      Head: Normocephalic.      Mouth/Throat:      Mouth: Mucous membranes are moist.   Eyes:      Pupils: Pupils are equal, round, and reactive to light.   Cardiovascular:      Rate and Rhythm: Tachycardia present. Rhythm irregular.   Pulmonary:      Effort: No accessory muscle usage or respiratory distress.      Breath sounds: Decreased breath sounds present.   Abdominal:      General: Bowel sounds are normal. There is no distension.      Palpations: Abdomen is soft.      Tenderness: There is no abdominal tenderness.      Comments: Peritoneal catheter in palce   Musculoskeletal:      Right lower leg: Edema present.      Left lower leg: Edema present.   Skin:     General: Skin is warm.   Neurological:      General: No focal deficit present.      Mental Status: She is disoriented.      GCS: GCS eye subscore is 4. GCS verbal subscore is 5. GCS motor subscore is 6.             Vents:  Oxygen Concentration (%): 28 (07/05/24 2320)  Lines/Drains/Airways       Central Venous Catheter Line  Duration             Trialysis (Dialysis) Catheter 07/08/24 2210 left internal jugular 2 days              Peripheral Intravenous Line  Duration                  Peripheral IV - Single Lumen 07/08/24 1023 20 G 1 3/4 in Anterior;Right Forearm 2 days                  Significant Labs:    CBC/Anemia Profile:  Recent Labs   Lab 07/10/24  0306 07/11/24  0357   WBC 14.98* 14.49*   HGB 10.9* 10.8*   HCT 35.8* 33.2*    162   MCV 85 81*   RDW 21.3* 21.6*        Chemistries:  Recent Labs   Lab 07/10/24  0306 07/10/24  1440 07/10/24  2211 07/11/24  0357    133* 133* 131*   K 4.3 4.3 4.1 4.3    102 101 100   CO2 18* 20* 22* 19*   BUN 72* 57* 26* 31*   CREATININE 6.9* 5.5* 3.3* 3.6*   CALCIUM 8.6* 8.6* 7.7* 8.2*   ALBUMIN 2.6* 2.7* 2.4* 2.5*   PROT 5.6*  --   --  5.5*   BILITOT 1.4*  --   --  1.3*   ALKPHOS 176*  --   --  175*   ALT 67*  --   --  72*   AST 65*  --   --  76*   MG 2.2 2.0 1.8 2.4   PHOS 6.2* 4.6* 2.5* 3.5       All pertinent labs within the past 24 hours have been reviewed.    Significant Imaging:  I have reviewed all pertinent imaging results/findings within the past 24 hours.

## 2024-07-11 NOTE — OP NOTE
[facility] - Cath Lab  Operative Note    Date of Procedure: 7/11/2024     Procedure: Procedure(s) (LRB):  Insertion, Catheter, Central Venous, Hemodialysis (Right)  REMOVAL, CATHETER, HEMODIALYSIS     Juhi Taylor  1950  [id number]    Pre-op Diagnosis: PEACE (acute kidney injury) [N17.9]   Post-op Diagnosis: PEACE (acute kidney injury) [N17.9]     Lt IJ Trialysis removal:  The skin was sterilized with Chlorhexidine.   The Heparin was removed from the venous and arterial ports.  A guidewire was inserted in the venous port and the tip was in the Rt. Ventricle.   The Trialysis was removed leaving the guidewire in the LT IJ.   Pressure was held.       It. IJ cuffed tunneled HD catheter under local anesthesia with sedation and anesthesia. Under real time ultrasound and fluoroscopy.     The patient was placed supine.    The Lt  was localized with US.    The skin was sterilized with Chlorhexidine.    2% Xylocaine with Epinephrine was given to anesthetize the skin and subcutaneous tissues.    The Lt  was punctured under real time ultrasound.  A guide wire was inserted and left.     The tunneled tract was anesthetized with 2% xylocaine with Epinephrine.    The catheter was tunneled under the skin.   The sheath was introduced in the SVC under fluoroscopy after  multiple sequential dilatations performed over the guide wire.   The catheter is inserted inside the sheath after removing the guidewire and the dilater.  The dilator was removed leaving the guidewire and the sheath in the Lt IJ.  The catheter was slided over the glide wire. It did not proceed to the SVC.  A second glide wire was introduced and the catheter was slided with pressure over both guidewires under fluoroscopy. The position of the tip is in the Lower SVC.    The curve is checked and no angulation in the tract was noticed.    Flushing and blood suction were checked from both catheter ports.    1000IU/ml  Heparin was inserted according to the catheter dead  space.   The wound was sutured.    No bleeding was noticed after observation.   Biopatch was placed over the wound and covered with Tegaderm.    Pressure dressing with rolled up 4 X4 gauze placed over the tunnel.    The procedure was smooth with no complications.        Complications: No  Condition: Good  FOLLOWUP: In patients      The procedure is done under real time fluoroscopy.     You can use the catheter for dialysis after the observation period.      PENNY MOLINA.Alexandro. MD. ALISSA. FACP.  , [employer] [facility] [employer]  Nephrology Consultant. [facility].   [address] [place] [address]. [address]th [place] [address].    email: [email or web address]  Tel: Office: [phone]

## 2024-07-11 NOTE — PROGRESS NOTES
" Palliative Medicine  Consult Note       Patient Name: Juhi Taylor   MRN: [id number]   Admission Date: 7/3/2024   Hospital Length of Stay: 8   Attending Provider: Messi Be*   Consulting Provider: SEAMUS Gautam  Primary Care Physician: Tony Sadler MD   Principal Problem: ESRD (end stage renal disease)     Patient information was obtained from relative(s), past medical records, and ER records.           Assessment/Plan:    CHF with complicated cardiac history.  Patient does not want further intervention and wants to go home. Youngest daughter wants to continue treatments.  Will need sequential treatments in efforts to be "better" but no one is convinced that she has the functional status to get "better".  ESRD does not want HD catheter placed. Will ask Nephro if PD can be restarted.    Palliative Care Encounter:  Impression:  Elderly woman with multiple medical problems including end-stage renal disease, worsening heart failure as well as significant dysrhythmias.  Patient says that she wants to go home however it is unclear if she understands that going home means that she will have a limited life span.  In her current date I am not sure she has a candidate for either peritoneal or hemodialysis.    Palliative care consulted for goals of care and decision making.       Advance Care Planning   Advance Directives:   Living Will: No    Do Not Resuscitate Status: No    Medical Power of : No      Decision Making:  Family answered questions and Patient unable to communicate due to disease severity/cognitive impairment  Goals of Care: What is most important right now is to focus on extending life as long as possible, even it it means sacrificing quality. Accordingly, we have decided that the best plan to meet the patient's goals includes continuing with treatment.    7/11/24: Pt was transferred to ICU on 7/8 for trialysis line and CRRT. Today pt received tunneled catheter. " "Mentation has somewhat improved per bs RN and notes, however pt is sleeping upon my visit. Pt's niece, who is a dialysis nurse, is at  and seems to have good insight into pt's disease process. Discussed that our next goals will be for pt to tolerate HD and for hopeful improvement of mental status. Family is hopeful pt will be able to return home. Will continue to follow for hopeful GOC conversation when pt is awake and able to participate in conversation appropriately.       7/8/24: Met with daughter, Nell, at  this am. She shares that she and her siblings have decided to go forward with tunneled catheter placement and HD "trial". This morning, pt is turning around in bed and appears agitated, without able to participate in conversation.  -Inquired with Nell what "successful" dialysis would look like to her, she shares it would be her mother talking, eating, and sharing for herself what she would want going forward. She is unsure how long the "trial" will be/should be.   -Nell feels her mother would want to "at least try" b/c she was willing to get the PD catheter several weeks ago.   -Attempted to continue discussion regarding  other aspects of her mother's declining health (CHF, ef 25%, etc). She attributes this to her renal function and feels HD may improve overall health. Discussed if ICU would be appropriate GOC if mother's BP did not tolerate HD for any reason. She asked why her mother was not in ICU now if she is "so sick". Redirected to mother's lack of current needs for ICU level of care, but that does not mean she is "not sick" with multiple concerning health issues.  -Nell is frustrated that we [[facility]] keep talking to her and her family. She does not wish to keep talking about what happens if HD does not "work" or what that may mean.   -Pt scheduled for tunneled cath today and supposed to get HD this evening.          - Prior experience with serious illness: yes  -The patient has previously " engaged in advance care planning or GOC discussions  - Insight/Understanding of illness: difficult decision making between family members.    Patient does not have a healthcare power of  but she has 3 of 4 living children.  Son Dawood and daughter Maria Luisa are in agreement however daughter francy is not willing to participate in the conversation.  But no decisions have been made    Life Limiting Diagnosis:  ESHD and ESRD  -Prognosis-Time and potential for recovery: poor  -Functional status: poor.  Pt was not able to manage ADLs  -Dementia diagnosis no      Symptom Management:  -Pain: no      -Dyspnea no      -Anxiety/Depression no      -Constipation: no      -Anorexia:yes      Summary of recommendations and follow up plan:  -Most important goals at this time: Further discussion about care plans and what might be beneficial   -Code status: Full Code but did make a recommendation that her status change to allow natural death.  -Disposition: continue current level of care.      Thank you for your consult. I will follow-up with patient. Please contact us if you have any additional questions.       Subjective:     Chief Complaint: No chief complaint on file.        HPI:   Ms. Taylor is a 75yo female with a PMHx of CAD, ESRD on PD, HFrEF of 40%, and afib on eliquis who was transferred to [facility] from [facility] for a higher level of care. She originally presented to [facility] ED on 6/18 for worsening SOB and was found to be satting 89% on RA determined to be due to a heart failure exacerbation. She was given lasix gtt and metolazone but still produced insufficient urine. HD was started, but during a dialysis session on 6/24 she suffered PEA cardiac arrest, epi was administered and she converted to V tach. After cardioversion ROSC was achieved and she was intubated and transferred to the ICU. Repeat Echo revealed an EF of 20-25% from 40% on admission but cardiology did not intervene at this time to due to her clinical condition but  recommended later evaluation for ICD and angiogram. Her condition improved with further HD. She was extubated and moved to the floor but developed an uptrending leukocytosis possibly originating from a left trialysis catheter with bruising and possible purulent discharge. The line was removed. She was determined to not be a candidate for hospice. The family requested transfer to [facility] for a higher level of care.       Hospital Course:  Since arrival to [facility], patient has refused interventions.  This morning in fact she refused taking her medications.  I was asked to see her to help delineate goals of care and further treatments.  Her son and oldest daughter Maria Luisa feel that the patient is nearing the end of her life.  Other daughter franyc does not want to hear anything about this.  She also does not think that her mother is capable of making any decisions.  Patient was pretty clear about going home and yesterday apparently understood the ramifications of stopping all of her current treatments.    Review of Symptoms      Symptom Assessment (ESAS 0-10 Scale)  Unable to complete assessment due to Mental status change     CAM / Delirium:  Negative  Constipation:  Negative  Diarrhea:  Negative      Bowel Management Plan (BMP):  Yes      Pain Assessment:  OME in 24 hours:  0  Location(s):      Pain Assessment in Advanced Demential Scale (PAINAD)   Breathing - Independent of vocalization:  0  Negative vocalization:  0  Facial expression:  0  Body language:  0  Consolability:  0  Total:  0    Modified Chago Scale:  0    Performance Status:  40    Living Arrangements:  Lives with family, Lives in home and Lives >50 miles from facility    Psychosocial/Cultural:   See Palliative Psychosocial Note: No  , 3 living children.    of pancreatic cancer with hospice. Son  with hospice as well.  Bam Camejo lives with patient, daughter Criss and son Dawood /wife supportive. They do not want mom to  suffer.  **Primary  to Follow**  Palliative Care  Consult: Yes    Spiritual:  F - Fely and Belief:  Non Religious  I - Importance:  Yes  C - Community:  Unknown  A - Address in Care:  Unknown           ROS:  Review of Systems   Constitutional:  Positive for activity change, appetite change and fatigue.   Respiratory:  Positive for shortness of breath.    Cardiovascular:  Negative for chest pain.   Neurological:  Positive for weakness.         Past Medical History:   Diagnosis Date    Anticoagulant long-term use     Arthritis     Breast cancer 2014    invasive lobular carcinoma    Cancer of kidney 11/2020    RIGHT KIDNEY CANCER    CHF (congestive heart failure)     Coronary artery disease dx 2005    Depression     Diabetes mellitus     Diastolic heart failure secondary to hypertension     Gout     Hyperlipemia     Hypertension     Hypertrophy of nasal turbinates     Kidney mass 2020    Right    Levoscoliosis     Lung nodule     left    Multiple thyroid nodules     NICOLE (obstructive sleep apnea)     uses C-PAP    Pulmonary hypertension     Severe sepsis 07/01/2024     Past Surgical History:   Procedure Laterality Date    AORTOGRAPHY N/A 12/04/2020    Procedure: Aortogram;  Surgeon: Paul Pedersen MD;  [facility] CATH;  Service: Cardiology;  Laterality: N/A;    BREAST SURGERY      CARDIAC CATHETERIZATION  12/2020    CHOLECYSTECTOMY      COLONOSCOPY      multi -last 2014     CORONARY ARTERY BYPASS GRAFT      ESOPHAGOGASTRODUODENOSCOPY      2012     HAND SURGERY Right     INSERTION OF CATHETER N/A 6/23/2024    Procedure: Insertion,catheter;  Surgeon: Meli Griffin MD;  Location: [facility] OR;  Service: Vascular;  Laterality: N/A;  ORIGINAL CATHETER WAS REPOSITIONED.  NO NEW CATHETER WAS PLACED    INSERTION, CATHETER, DIALYSIS, PERITONEAL N/A 6/4/2024    Procedure: IN Insertion Catheter, Dialysis, Peritoneal - laparoscopic;  Surgeon: Rodriguez Catalan Jr., MD;  Location: [facility] OR;   "Service: General;  Laterality: N/A;    LAPAROSCOPIC ROBOT-ASSISTED SURGICAL REMOVAL OF KIDNEY USING DA CHELLE XI Right 03/10/2022    Procedure: XI ROBOTIC NEPHRECTOMY- radical;  Surgeon: Rolando Ramirez MD;  Location: [facility] OR;  Service: Urology;  Laterality: Right;    MASTECTOMY W/ SENTINEL NODE BIOPSY Bilateral 01/21/2015    bilateral "dog ears"    NASAL SINUS SURGERY  2015    Dr Bryant FESS/cauterization turbinate     PARTIAL HYSTERECTOMY  1989    PERCUTANEOUS TRANSLUMINAL BALLOON ANGIOPLASTY OF CORONARY ARTERY  12/04/2020    Procedure: Angioplasty-coronary;  Surgeon: Paul Pedersen MD;  Location: [facility] CATH;  Service: Cardiology;;    RENAL BIOPSY Right     9/20/2021 EJ    TUBAL LIGATION      ULTRASOUND GUIDANCE  12/04/2020    Procedure: Ultrasound Guidance;  Surgeon: Paul Pedersen MD;  Location: [facility] CATH;  Service: Cardiology;;     Family History   Problem Relation Name Age of Onset    Breast cancer Mother      Stroke Father Waqar Sr.     Hypertension Father Waqar Sr.     Hepatitis Brother      Asthma Daughter Nell Taylor     Birth defects Daughter Nell Camejo's two children has cleft lips    Depression Daughter Nell Taylor     Drug abuse Daughter Nell Taylor     Learning disabilities Daughter Nell Taylor     Mental illness Daughter Nell Taylor     Breast cancer Maternal Aunt      Glaucoma Sister      Drug abuse Daughter Nell     Macular degeneration Neg Hx      Retinal detachment Neg Hx           Review of patient's allergies indicates:   Allergen Reactions    Percocet [oxycodone-acetaminophen] Itching    Amiodarone analogues      Itching      Irbesartan Swelling    Januvia [sitagliptin]     Jardiance [empagliflozin]      Leg cramps    Lipitor [atorvastatin] Other (See Comments)     Severe leg pain    Linaclotide Other (See Comments) and Nausea And Vomiting     Does not remember    Lubiprostone Other (See Comments) and Palpitations     Does not remember "       Medications:    Current Facility-Administered Medications:     acetaminophen tablet 650 mg, 650 mg, Oral, Q6H PRN, Zakia Miranda, DO, 650 mg at 07/05/24 1550    albuterol sulfate nebulizer solution 2.5 mg, 2.5 mg, Nebulization, Q4H PRN, Zakia Miranda, DO    amiodarone 360 mg/200 mL (1.8 mg/mL) infusion, 1 mg/min, Intravenous, Continuous, Stopped at 07/11/24 1326 **FOLLOWED BY** amiodarone 360 mg/200 mL (1.8 mg/mL) infusion, 0.5 mg/min, Intravenous, Continuous, Desiree Painter DNP, Last Rate: 16.7 mL/hr at 07/11/24 1545, 0.5 mg/min at 07/11/24 1545    aspirin EC tablet 81 mg, 81 mg, Oral, Daily, Zakia Miranda, , 81 mg at 07/11/24 0932    carvediloL tablet 25 mg, 25 mg, Oral, BID, Jose Roberto Gillis NP, 25 mg at 07/11/24 0932    dextromethorphan-guaiFENesin  mg/5 ml liquid 5 mL, 5 mL, Oral, Q4H PRN, Zakia Miranda, , 5 mL at 07/03/24 1302    dextrose 10% bolus 125 mL 125 mL, 12.5 g, Intravenous, PRN, Zkaia Miranda, , Stopped at 07/09/24 2239    dextrose 10% bolus 250 mL 250 mL, 25 g, Intravenous, PRN, Zakia Miranda,     gentamicin 0.1 % ointment, , Topical (Top), TID, Jon Hernandez MD, Given at 07/11/24 1459    glucagon (human recombinant) injection 1 mg, 1 mg, Intramuscular, PRN, Zakia Miranda,     glucose chewable tablet 16 g, 16 g, Oral, PRN, Zakia Miranda,     glucose chewable tablet 24 g, 24 g, Oral, PRN, Zakia Miranda, DO    LIDOcaine-EPINEPHrine 1%-1:100,000 injection, , , PRN, Hawa Landa MD, 20 mL at 07/11/24 1025    naloxone 0.4 mg/mL injection 0.02 mg, 0.02 mg, Intravenous, PRN, Zakia Miranda DO    ondansetron injection 4 mg, 4 mg, Intravenous, Q6H PRN, Zakia Miranda DO, 4 mg at 07/05/24 0303    simethicone chewable tablet 80 mg, 1 tablet, Oral, TID PRN, Zakia Miranda DO, 80 mg at 07/04/24 1818    sodium chloride 0.9% flush 10 mL, 10 mL, Intravenous, PRN, Hawa Landa MD         Objective:      Physical Exam:  Vitals: Temp: 98.5 °F (36.9 °C) (07/11/24  0700)  Pulse: 104 (07/11/24 1000)  Resp: (!) 22 (07/11/24 1000)  BP: 114/85 (07/11/24 1000)  SpO2: 100 % (07/11/24 1000)    Physical Exam  Constitutional:       Appearance: She is ill-appearing.   Cardiovascular:      Rate and Rhythm: Tachycardia present.   Pulmonary:      Breath sounds: Rhonchi present.   Neurological:      General: No focal deficit present.                 Labs:   Creatinine   Date Value Ref Range Status   07/11/2024 3.6 (H) 0.5 - 1.4 mg/dL Final     POC Creatinine   Date Value Ref Range Status   03/12/2021 1.1 0.5 - 1.4 mg/dL Final      Hemoglobin   Date Value Ref Range Status   07/11/2024 10.8 (L) 12.0 - 16.0 g/dL Final      Albumin   Date Value Ref Range Status   07/11/2024 2.5 (L) 3.5 - 5.2 g/dL Final   07/10/2024 2.4 (L) 3.5 - 5.2 g/dL Final   07/10/2024 2.7 (L) 3.5 - 5.2 g/dL Final          Imaging: reviewed    > 50% of 55 min visit spent in chart review, face to face discussion of goals of care,  symptom assessment, coordination of care, charting, and emotional support     Thank you for the opportunity to care for this patient and family.       Liza Pandey, CNS

## 2024-07-11 NOTE — PATIENT CARE CONFERENCE
HEMODIALYSIS CATHETER CARE  Patients' instruction list      Hemodialysis catheter is one of the major vascular access to provide hemodialysis. Infection is one of its common complication. To maintain a safe and long-term use of your catheter without the requirement of replacement and exchange please follow the care instruction for your catheter:    Avoid getting water on the catheter. It is highly recommended to keep the catheter dry. During bathing use only sponging with towel around the catheter area. Lower part of the body can be washed but avoid splashing water over the catheter.     Avoid scratching or touching the skin close to the catheter.     It is recommended to change the dressing during dialysis sessions only. If you find your catheter dressing is wet or not clean, please call your dialysis unit to arrange exchange as soon as possible.     If you notice pain, redness or discharge from the exit site of the catheter please call your dialysis unit or your Nephrologist immediately to arrange examining the catheter and excluding any early signs of infection.     Your dialysis catheter should only be used for dialysis. Only your dialysis nurse can use the catheter. Do not accept any other health care personnel to use your hemodialysis catheter for any reason. This catheter is not intended to be used for medications, IV fluid or taking blood for labs.     6.   The sutures used for the procedure are absorbably sutures and they will dissolve by themselves in 3 weeks.     The more strict you are in following the instructions the less will be the risk of infection and complications with your dialysis catheter.    We would like to wish you a great health and we will be glad to answer any question you have.      please communicate with [facility] or use [facility] to send us your questions.       PENNY MOLINA.Alexandro. MD. ISABEL SPENCE.  , [facility] [employer] [facility]  of [place] ([place]).  Nephrology Consultant. [facility].   [address],  [facility]. 5th [place] [address].    email: [email or web address].  Tel: Office: [phone]

## 2024-07-11 NOTE — ASSESSMENT & PLAN NOTE
Pt previously on PD. Not unable to remove enough volume with PD catheter.     --Nephrology following, appreciate recs  --Transfer to MICU for emergent trialysis placement and emergent dialysis, now on CRRT  -- Plan for tunneled catheter placement today with interventional nephrology

## 2024-07-11 NOTE — PLAN OF CARE
MICU DAILY GOALS     Family/Goals of care/Code Status   Code Status: Full Code    24H Vital Sign Range  Temp:  [98 °F (36.7 °C)-98.5 °F (36.9 °C)]   Pulse:  []   Resp:  [10-30]   BP: (110-172)/(64-99)   SpO2:  [88 %-100 %]      Shift Events (include procedures and significant events)   Pt went to Cath lab for Cath placement for dialysis; tensaplast in place and to be left on for 24hrs 7/12 1345. Bedside swallow performed; pureed diet ordered.  Plan for HD trial at bedside tomorrow.     AWAKE RASS: Goal -    Actual - RASS (Jha Agitation-Sedation Scale): drowsy    Restraint necessity: Not necessary   BREATHE SBT: Not intubated    Coordinate A & B, analgesics/sedatives Pain: managed   SAT: Not intubated   Delirium CAM-ICU: Overall CAM-ICU: Positive   Early(intubated/ Progressive (non-intubated) Mobility MOVE Screen (INTUBATED ONLY): Not intubated    Activity: Activity Management: Rolling - L1   Feeding/Nutrition Diet order: Diet/Nutrition Received: NPO, ice chips, Specialty Diet/Nutrition Received: renal diet   Thrombus DVT prophylaxis: VTE Required Core Measure: Pharmacological prophylaxis initiated/maintained   HOB Elevation Head of Bed (HOB) Positioning: HOB at 30-45 degrees   Ulcer Prophylaxis GI: yes   Glucose control managed Glycemic Management: blood glucose monitored   Skin Skin assessed during: Daily Assessment    Sacrum intact/not altered? Yes  Heels intact/not altered? Yes  Surgical wound? No    CHECK ONE!   (no altered skin or altered skin) and sub boxes:  [x] No Altered Skin Integrity Present    [x]Prevention Measures Documented    [] Altered Skin Integrity Present or Discovered   [] LDA present in EPIC, daily doc completed              [] LDA added if not in EPIC (describe wound).                    When describing wound, do not stage, use descriptive words only.    [] Wound Image Taken (required on admit,                   transfer/discharge and every Tuesday)    Wound Care Consulted? No    4  EYES:  Attending Nurse (1st set of eyes): NEENA Sánchez    Second RN/Staff Member (2nd set of eyes): Lisa, PCT   Bowel Function no issues    Indwelling Catheter Necessity      [REMOVED] Trialysis (Dialysis) Catheter 07/08/24 2210 left internal jugular-Line Necessity Review: CRRT/HD  Reviewed   De-escalation Antibiotics No        VS and assessment per flow sheet, patient progressing towards goals as tolerated, plan of care reviewed with  Pt and family , all concerns addressed, will continue to monitor.

## 2024-07-11 NOTE — PROGRESS NOTES
[facility] Card[facility] Medical ICU  Nephrology  Progress Note    Patient Name: Juhi Taylor  MRN: [id number]  Admission Date: 7/3/2024  Hospital Length of Stay: 8 days  Attending Provider: Messi Be*   Primary Care Physician: Tony Sadler MD  Principal Problem:Hypoxic respiratory failure    Subjective:     HPI: 74-year-old female with prior history of CKD stage 5 with recent PD catheter placement on 6/4, RCC s/p right nephrectomy, T2DM, obesity, CAD s/p PCI to Lcx and LAD in 11/2020 and HFmREF who is admitted as a transfer from [facility] for higher level of care and further cardiac evaluation. Nephrology consulted for hemodialysis.     She initially presented on 6/18/24 to [facility] for hypervolemia in the setting of CKD 4-5 for which she had undergone elective PD catheter placement complicated by catheter site leaking. She had a complex hospital course, was initially placed on furosemide gtt without significant improvement and later underwent placement of tunneled line for HD with volume removal. During her second dialysis session she went into PEA arrest for which she was resuscitated, intubated and transferred to ICU. She was noted to have elevated troponin and subsequent TTE found reduced EF from 40-45% to 20-25% for which coronary angiography was recommended. Her hospital course was also complicated by dialysis line infection, significant bleeding around the catheter site, bradycardia and subsequent atrial fibrillation with RVR. Given the catheter site bleeding, anticoagulation was temporarily held. She has reported allergy to amiodarone, was initially placed on diltiazem for atrial fibrillation despite reduced EF. She discussed GOC at outside hospital where it appears that she lacked capacity per note and decided to pursue higher level of care at [facility]. At that time, gen surgery was planning to place a left tunneled catheter due to concerns of the right side not being suitable    Upon admit,  patient appeared drowsy but conversant. Daughter reports that she typically gets lethargic 2/2 to the volume overload due to not receiving dialysis. Patient reported b/l left lower extremity pain. Daughter concerned about her not having dialysis in 4 days and wants a session today.     Interval History: Mentation minimally improved since yesterday, d/w her regarding dialysis, she agrees to procedure this am. No family at bedside at time of interview. No major complaints at this time.     Review of patient's allergies indicates:   Allergen Reactions    Percocet [oxycodone-acetaminophen] Itching    Amiodarone analogues      Itching      Irbesartan Swelling    Januvia [sitagliptin]     Jardiance [empagliflozin]      Leg cramps    Lipitor [atorvastatin] Other (See Comments)     Severe leg pain    Linaclotide Other (See Comments) and Nausea And Vomiting     Does not remember    Lubiprostone Other (See Comments) and Palpitations     Does not remember     Current Facility-Administered Medications   Medication Frequency    0.9%  NaCl infusion (CRRT USE ONLY) Continuous    acetaminophen tablet 650 mg Q6H PRN    albuterol sulfate nebulizer solution 2.5 mg Q4H PRN    aspirin EC tablet 81 mg Daily    carvediloL tablet 25 mg BID    dextromethorphan-guaiFENesin  mg/5 ml liquid 5 mL Q4H PRN    dextrose 10% bolus 125 mL 125 mL PRN    dextrose 10% bolus 250 mL 250 mL PRN    gentamicin 0.1 % ointment TID    glucagon (human recombinant) injection 1 mg PRN    glucose chewable tablet 16 g PRN    glucose chewable tablet 24 g PRN    magnesium sulfate 2g in water 50mL IVPB (premix) PRN    naloxone 0.4 mg/mL injection 0.02 mg PRN    ondansetron injection 4 mg Q6H PRN    simethicone chewable tablet 80 mg TID PRN    sodium chloride 0.9% flush 10 mL PRN    sodium phosphate 20.01 mmol in D5W 250 mL IVPB PRN    sodium phosphate 30 mmol in D5W 250 mL IVPB PRN    sodium phosphate 39.99 mmol in D5W 250 mL IVPB PRN       Objective:     Vital  Signs (Most Recent):  Temp: 98.5 °F (36.9 °C) (07/11/24 0700)  Pulse: 97 (07/11/24 0700)  Resp: (!) 21 (07/11/24 0700)  BP: (!) 131/99 (07/11/24 0700)  SpO2: 100 % (07/11/24 0700) Vital Signs (24h Range):  Temp:  [98 °F (36.7 °C)-98.5 °F (36.9 °C)] 98.5 °F (36.9 °C)  Pulse:  [] 97  Resp:  [10-30] 21  SpO2:  [88 %-100 %] 100 %  BP: (110-172)/(64-99) 131/99     Weight: 128.5 kg (283 lb 4.7 oz) (07/08/24 2000)  Body mass index is 44.37 kg/m².  Body surface area is 2.46 meters squared.    I/O last 3 completed shifts:  In: 2734.6 [I.V.:2505.4; IV Piggyback:229.2]  Out: 2839 [Other:2839]     Physical Exam  Constitutional:       General: She is not in acute distress.     Appearance: Normal appearance. She is obese.   HENT:      Head: Normocephalic and atraumatic.      Mouth/Throat:      Mouth: Mucous membranes are moist.      Pharynx: Oropharynx is clear.   Cardiovascular:      Rate and Rhythm: Normal rate and regular rhythm.      Heart sounds: Normal heart sounds.   Pulmonary:      Effort: Pulmonary effort is normal.      Breath sounds: Normal breath sounds.   Abdominal:      Palpations: Abdomen is soft.   Musculoskeletal:      Right lower leg: Edema (2+) present.      Left lower leg: Edema (2+) present.   Skin:     General: Skin is warm and dry.   Neurological:      General: No focal deficit present.      Mental Status: She is alert. She is disoriented.      Motor: Weakness present.          Significant Labs:  All labs within the past 24 hours have been reviewed.     Significant Imaging:  All imaging with the past 24 hours have been reviewed.  Assessment/Plan:     Renal/  CKD stage 4 secondary to hypertension  74-year-old female with DMT2, ESRD on HD, pAF, CAD s/p PCI and HFrEF is admitted as a transfer for acute HFrEF in setting of CKD5 and recent PEA arrest during dialysis. Recent PD catheter placement on 6/4 but concerned for a possibly leak from cath. Unable to initiate dialysis for 2 weeks PTA to OSH due to  insurance issues. Hospital course complicated by infected tunneled cath removed on 6/30. Transferred here for higher level of care. Initial plan at OSH was to replace tunneled catheter on the left side due to concerns to that right side may not by suitable. Peritoneal fluid studies negative for peritonitis. PD was initiated here in hospital however we were unable to remove significant amounts of fluid, palliative team was consulted. At this time the daughter wishes to proceed with HD.     Plan:  - No dialysis today, tunnel cath to be placed.   - PD fluid sample reviewed, 25% neutrophils and WBC 41, not consistent w infection.   - Trend RFP; replete electrolytes PRN for goal K > 4, Phos > 3, Mg > 2  - Strict I&Os  - Avoid nephrotoxic agents  - Renally adjust medications        Thank you for your consult. I will follow-up with patient. Please contact us if you have any additional questions.    Paulo Alicea MD  Nephrology  [facility] - Cardiac Medical [facility]

## 2024-07-11 NOTE — PT/OT/SLP PROGRESS
Speech Language Pathology      Juhi Taylor  MRN: [id number]    Patient not seen today secondary to NPO for procedure. Will follow-up per SLP POC.      7/11/2024

## 2024-07-11 NOTE — ASSESSMENT & PLAN NOTE
74-year-old female with DMT2, ESRD on HD, pAF, CAD s/p PCI and HFrEF is admitted as a transfer for acute HFrEF in setting of CKD5 and recent PEA arrest during dialysis. Recent PD catheter placement on 6/4 but concerned for a possibly leak from cath. Unable to initiate dialysis for 2 weeks PTA to OSH due to insurance issues. Hospital course complicated by infected tunneled cath removed on 6/30. Transferred here for higher level of care. Initial plan at OSH was to replace tunneled catheter on the left side due to concerns to that right side may not by suitable. Peritoneal fluid studies negative for peritonitis. PD was initiated here in hospital however we were unable to remove significant amounts of fluid, palliative team was consulted. At this time the daughter wishes to proceed with HD.     Plan:  - No dialysis today, tunnel cath to be placed.   - PD fluid sample reviewed, 25% neutrophils and WBC 41, not consistent w infection.   - Trend RFP; replete electrolytes PRN for goal K > 4, Phos > 3, Mg > 2  - Strict I&Os  - Avoid nephrotoxic agents  - Renally adjust medications

## 2024-07-11 NOTE — PROGRESS NOTES
[facility] - Cath Lab  Critical Care Medicine  Progress Note    Patient Name: Juhi Taylor  MRN: [id number]  Admission Date: 7/3/2024  Hospital Length of Stay: 8 days  Code Status: Full Code  Attending Provider: Messi Be*  Primary Care Provider: Tony Sadler MD   Principal Problem: Hypoxic respiratory failure    Subjective:     HPI:  Juhi Taylor is a 74-year-old female with a past medical history of CKD stage 5 with recent PD catheter placement on 6/4, RCC s/p right nephrectomy, T2DM, obesity, CAD s/p PCI to Lcx and LAD in 11/2020 and HFmREF who is admitted as a transfer from [facility] for higher level of care and further cardiac evaluation. Nephrology consulted for hemodialysis. She initially presented on 6/18/24 to [facility] for hypervolemia in the setting of CKD 4-5 for which she had undergone elective PD catheter placement complicated by catheter site leaking. She had a complex hospital course, was initially placed on furosemide gtt without significant improvement and later underwent placement of tunneled line for HD with volume removal. During her second dialysis session she went into PEA arrest for which she was resuscitated, intubated and transferred to ICU. She was noted to have elevated troponin and subsequent TTE found reduced EF from 40-45% to 20-25% for which coronary angiography was recommended. Her hospital course was also complicated by dialysis line infection, significant bleeding around the catheter site, bradycardia and subsequent atrial fibrillation with RVR. Given the catheter site bleeding, anticoagulation was temporarily held. She has reported allergy to amiodarone, was initially placed on diltiazem for atrial fibrillation despite reduced EF. She discussed GOC at outside hospital where it appears that she lacked capacity per note and decided to pursue higher level of care at [facility]. At that time, gen surgery was planning to place a left tunneled catheter due to concerns  of the right side not being suitable     Upon admit, patient appeared drowsy but conversant. Daughter reports that she typically gets lethargic 2/2 to the volume overload due to not receiving dialysis. Patient reported b/l left lower extremity pain. Daughter concerned about her not having dialysis in 4 days and wants a session today.     Pt initially refusing dialysis. Seen by nephrology. VBG 7.220/33.4/34/13.7/-14. Critical care medicine consulted for emergent trialysis line placement and dialysis.     Hospital/ICU Course:  Ms. Taylor was admitted to MICU on 7/8 for urgent dialysis line placement and initiated of HD. Patient received short run of CRRT overnight without issue. Encephalopathy improving with continued CRRT treatments. Persistent AFib with RVR, received Digoxin loading dose (renally dosed), restarted PO Carvedilol. Patient remains in persistent AFib RVR despite therapeutic digoxin level and uptitration of Carvedilol levels, started on Amiodarone infusion overnight.     Interval History/Significant Events: Amiodarone infusion initiated overnight for AFib with RVR with HR persistently in the 150s.     Review of Systems   Respiratory:  Negative for shortness of breath.    Cardiovascular:  Negative for chest pain.   Gastrointestinal:  Positive for abdominal pain.     Objective:     Vital Signs (Most Recent):  Temp: 98.5 °F (36.9 °C) (07/11/24 0700)  Pulse: 97 (07/11/24 0700)  Resp: (!) 21 (07/11/24 0700)  BP: (!) 131/99 (07/11/24 0700)  SpO2: 100 % (07/11/24 0700) Vital Signs (24h Range):  Temp:  [98 °F (36.7 °C)-98.5 °F (36.9 °C)] 98.5 °F (36.9 °C)  Pulse:  [] 97  Resp:  [10-30] 21  SpO2:  [88 %-100 %] 100 %  BP: (110-172)/(64-99) 131/99   Weight: 128.5 kg (283 lb 4.7 oz)  Body mass index is 44.37 kg/m².      Intake/Output Summary (Last 24 hours) at 7/11/2024 0929  Last data filed at 7/11/2024 0600  Gross per 24 hour   Intake 2680.24 ml   Output 2839 ml   Net -158.76 ml          Physical  Exam  Vitals and nursing note reviewed.   Constitutional:       Appearance: She is obese. She is ill-appearing.   HENT:      Head: Normocephalic.      Mouth/Throat:      Mouth: Mucous membranes are moist.   Eyes:      Pupils: Pupils are equal, round, and reactive to light.   Cardiovascular:      Rate and Rhythm: Tachycardia present. Rhythm irregular.   Pulmonary:      Effort: No accessory muscle usage or respiratory distress.      Breath sounds: Decreased breath sounds present.   Abdominal:      General: Bowel sounds are normal. There is no distension.      Palpations: Abdomen is soft.      Tenderness: There is no abdominal tenderness.      Comments: Peritoneal catheter in palce   Musculoskeletal:      Right lower leg: Edema present.      Left lower leg: Edema present.   Skin:     General: Skin is warm.   Neurological:      General: No focal deficit present.      Mental Status: She is disoriented.      GCS: GCS eye subscore is 4. GCS verbal subscore is 5. GCS motor subscore is 6.            Vents:  Oxygen Concentration (%): 28 (07/05/24 2320)  Lines/Drains/Airways       Central Venous Catheter Line  Duration             Trialysis (Dialysis) Catheter 07/08/24 2210 left internal jugular 2 days              Peripheral Intravenous Line  Duration                  Peripheral IV - Single Lumen 07/08/24 1023 20 G 1 3/4 in Anterior;Right Forearm 2 days                  Significant Labs:    CBC/Anemia Profile:  Recent Labs   Lab 07/10/24  0306 07/11/24  0357   WBC 14.98* 14.49*   HGB 10.9* 10.8*   HCT 35.8* 33.2*    162   MCV 85 81*   RDW 21.3* 21.6*        Chemistries:  Recent Labs   Lab 07/10/24  0306 07/10/24  1440 07/10/24  2211 07/11/24  0357    133* 133* 131*   K 4.3 4.3 4.1 4.3    102 101 100   CO2 18* 20* 22* 19*   BUN 72* 57* 26* 31*   CREATININE 6.9* 5.5* 3.3* 3.6*   CALCIUM 8.6* 8.6* 7.7* 8.2*   ALBUMIN 2.6* 2.7* 2.4* 2.5*   PROT 5.6*  --   --  5.5*   BILITOT 1.4*  --   --  1.3*   ALKPHOS 176*   --   --  175*   ALT 67*  --   --  72*   AST 65*  --   --  76*   MG 2.2 2.0 1.8 2.4   PHOS 6.2* 4.6* 2.5* 3.5       All pertinent labs within the past 24 hours have been reviewed.    Significant Imaging:  I have reviewed all pertinent imaging results/findings within the past 24 hours.    ABG  Recent Labs   Lab 07/09/24  0904   PH 7.281*   PO2 42   PCO2 47.0*   HCO3 22.1*   BE -5*     Assessment/Plan:     Pulmonary  * Hypoxic respiratory failure  -- Resolved     Cardiac/Vascular  Atrial fibrillation with RVR  AFib with RVR appears new following cardiac arrest at OSH. Per chart review, AFib was difficult to control. Patient endorses amiodarone reaction with itching, rash, and oral burning. Refused Sotalol, DCCV, and AICD placement at OSH. Was placed on Diltizem infusion and transitioned to PO metoprolol.     -- AFib persistent despite IV lopressor administration  -- Daily CMP  -- Keep K>4, Mag >2  -- Renally does with Digoxin on 7/9 with therapeutic level on 7/10.  -- Carvedilol restarted and uptitrated  -- Started on Amiodarone infusion on 7/11 for persistent AFib RVR with HR in 150s    Acute on chronic combined systolic and diastolic heart failure  Echo    Left Ventricle: The left ventricle is moderately dilated. Normal wall thickness. Global hypokinesis present. There is severely reduced systolic function with a visually estimated ejection fraction of 20 - 25%.    Right Ventricle: Mild right ventricular enlargement. Wall thickness is normal. Right ventricle wall motion has global hypokinesis. Systolic function is moderately reduced.    Left Atrium: Left atrium is severely dilated.    Right Atrium: Right atrium is severely dilated.    Aortic Valve: The aortic valve is a trileaflet valve. There is mild aortic valve sclerosis.    Mitral Valve: There is mild regurgitation.    Tricuspid Valve: There is severe regurgitation.    Pulmonary Artery: There is moderate pulmonary hypertension. The estimated pulmonary artery  systolic pressure is 61 mmHg.    IVC/SVC: Elevated venous pressure at 15 mmHg.    Pericardium: There is a trivial circumferential effusion. No indication of cardiac tamponade.        Essential hypertension  - Carvedilol restarted    Renal/  CKD stage 4 secondary to hypertension  Pt previously on PD. Not unable to remove enough volume with PD catheter.     --Nephrology following, appreciate recs  --Transfer to MICU for emergent trialysis placement and emergent dialysis, now on CRRT  -- Plan for tunneled catheter placement today with interventional nephrology     Endocrine  Type 2 diabetes mellitus with microalbuminuria, without long-term current use of insulin  --POC glucose  --Hypoglycemia protocol    Palliative Care  Palliative care encounter  --Palliative care following, continued goals of care with family    ACP (advance care planning)  - Palliative care consulted, appreciate their assistance in clarification of GOC/POC with patient and family  - Family to hopeful patient's mentation will improve and patient will be able to participate in GOC discussions       Critical Care Daily Checklist:    A: Awake: RASS Goal/Actual Goal:  0  Actual:  -1   B: Spontaneous Breathing Trial Performed?  N/A   C: SAT & SBT Coordinated?  N/A                      D: Delirium: CAM-ICU Overall CAM-ICU: Positive   E: Early Mobility Performed? No   F: Feeding Goal:    Status:     Current Diet Order   Procedures    Diet NPO Except for: Medication     Order Specific Question:   Except for     Answer:   Medication      AS: Analgesia/Sedation    T: Thromboembolic Prophylaxis On hold for planned tunnel catheter placement   H: HOB > 300 Yes   U: Stress Ulcer Prophylaxis (if needed)    G: Glucose Control Goal 140-180   B: Bowel Function Stool Occurrence: 1   I: Indwelling Catheter (Lines & Damon) Necessity Peritoneal HD Catheter  Trialysis (7/8)   D: De-escalation of Antimicrobials/Pharmacotherapies     Plan for the day/ETD Tunneled line  placement    Code Status:  Family/Goals of Care: Full Code  Daughter at bedside updated on POC     Critical Care Time: 60 minutes  Critical secondary to Patient has a condition that poses threat to life and bodily function: encephalopathy, ESRD      Critical care was time spent personally by me on the following activities: development of treatment plan with patient or surrogate and bedside caregivers, discussions with consultants, evaluation of patient's response to treatment, examination of patient, ordering and performing treatments and interventions, ordering and review of laboratory studies, ordering and review of radiographic studies, pulse oximetry, re-evaluation of patient's condition. This critical care time did not overlap with that of any other provider or involve time for any procedures.     Desiree Painter, NIXON  Critical Care Medicine  [facility] - Cath Lab

## 2024-07-12 PROBLEM — J96.91 HYPOXIC RESPIRATORY FAILURE: Status: RESOLVED | Noted: 2024-07-04 | Resolved: 2024-07-12

## 2024-07-12 NOTE — PROGRESS NOTES
[facility] - Cardiac Medical ICU  Critical Care Medicine  Progress Note    Patient Name: Juhi Taylor  MRN: [id number]  Admission Date: 7/3/2024  Hospital Length of Stay: 9 days  Code Status: Full Code  Attending Provider: Kristin Alvarado MD  Primary Care Provider: Tony Sadler MD   Principal Problem: ESRD (end stage renal disease)    Subjective:     HPI:  Juhi Taylor is a 74-year-old female with a past medical history of CKD stage 5 with recent PD catheter placement on 6/4, RCC s/p right nephrectomy, T2DM, obesity, CAD s/p PCI to Lcx and LAD in 11/2020 and HFmREF who is admitted as a transfer from [facility] for higher level of care and further cardiac evaluation. Nephrology consulted for hemodialysis. She initially presented on 6/18/24 to [facility] for hypervolemia in the setting of CKD 4-5 for which she had undergone elective PD catheter placement complicated by catheter site leaking. She had a complex hospital course, was initially placed on furosemide gtt without significant improvement and later underwent placement of tunneled line for HD with volume removal. During her second dialysis session she went into PEA arrest for which she was resuscitated, intubated and transferred to ICU. She was noted to have elevated troponin and subsequent TTE found reduced EF from 40-45% to 20-25% for which coronary angiography was recommended. Her hospital course was also complicated by dialysis line infection, significant bleeding around the catheter site, bradycardia and subsequent atrial fibrillation with RVR. Given the catheter site bleeding, anticoagulation was temporarily held. She has reported allergy to amiodarone, was initially placed on diltiazem for atrial fibrillation despite reduced EF. She discussed GOC at outside hospital where it appears that she lacked capacity per note and decided to pursue higher level of care at [facility]. At that time, gen surgery was planning to place a left tunneled catheter due to  concerns of the right side not being suitable     Upon admit, patient appeared drowsy but conversant. Daughter reports that she typically gets lethargic 2/2 to the volume overload due to not receiving dialysis. Patient reported b/l left lower extremity pain. Daughter concerned about her not having dialysis in 4 days and wants a session today.     Pt initially refusing dialysis. Seen by nephrology. VBG 7.220/33.4/34/13.7/-14. Critical care medicine consulted for emergent trialysis line placement and dialysis.     Hospital/ICU Course:  Ms. Taylor was admitted to MICU on 7/8 for urgent dialysis line placement and initiated of HD. Patient received short run of CRRT overnight without issue. Encephalopathy improving with continued CRRT treatments. Persistent AFib with RVR, received Digoxin loading dose (renally dosed), restarted PO Carvedilol. Patient remains in persistent AFib RVR despite therapeutic digoxin level and uptitration of Carvedilol levels, started on Amiodarone infusion overnight. Transitioned to amio PO, started on heparin gtt for afib AC. Consulted gen surg for removal of PD line. Trial HD run.    Interval History/Significant Events: New midline placed overnight. Transitioned amio from IV to PO; added Heparin gtt for afib AC.    Review of Systems   Unable to perform ROS: Acuity of condition     Objective:     Vital Signs (Most Recent):  Temp: 97.5 °F (36.4 °C) (07/12/24 1501)  Pulse: 101 (07/12/24 1702)  Resp: 20 (07/12/24 1702)  BP: 117/75 (07/12/24 1702)  SpO2: 99 % (07/12/24 1702) Vital Signs (24h Range):  Temp:  [97 °F (36.1 °C)-97.9 °F (36.6 °C)] 97.5 °F (36.4 °C)  Pulse:  [] 101  Resp:  [16-22] 20  SpO2:  [90 %-100 %] 99 %  BP: ()/(53-88) 117/75   Weight: 128.5 kg (283 lb 4.7 oz)  Body mass index is 44.37 kg/m².      Intake/Output Summary (Last 24 hours) at 7/12/2024 1718  Last data filed at 7/12/2024 1700  Gross per 24 hour   Intake 254.02 ml   Output --   Net 254.02 ml           Physical Exam  Vitals and nursing note reviewed.   Constitutional:       General: She is awake.      Appearance: She is ill-appearing.      Comments: Lethargic   HENT:      Mouth/Throat:      Mouth: Mucous membranes are moist.      Pharynx: Oropharynx is clear.   Eyes:      Pupils: Pupils are equal, round, and reactive to light.   Cardiovascular:      Rate and Rhythm: Tachycardia present. Rhythm irregular.      Pulses: Normal pulses.      Comments: Afib  Pulmonary:      Effort: Pulmonary effort is normal.      Breath sounds: Normal breath sounds.   Chest:      Comments: Tunneled cath present  Abdominal:      Palpations: Abdomen is soft.      Comments: PD catheter   Musculoskeletal:         General: Swelling present. Normal range of motion.      Right lower leg: Edema present.      Left lower leg: Edema present.   Skin:     General: Skin is warm and dry.      Capillary Refill: Capillary refill takes less than 2 seconds.   Neurological:      General: No focal deficit present.      Mental Status: She is oriented to person, place, and time. She is lethargic.      GCS: GCS eye subscore is 3. GCS verbal subscore is 5. GCS motor subscore is 6.   Psychiatric:         Behavior: Behavior is cooperative.          Vents:  Oxygen Concentration (%): 28 (07/05/24 2320)  Lines/Drains/Airways       Central Venous Catheter Line  Duration                  Hemodialysis Catheter 07/11/24 1219 left subclavian 1 day              Peripheral Intravenous Line  Duration                  Midline Catheter - Single Lumen 07/12/24 0430 Right basilic vein (medial side of arm) <1 day                  Significant Labs:    CBC/Anemia Profile:  Recent Labs   Lab 07/11/24  0357 07/12/24  0403   WBC 14.49* 11.08   HGB 10.8* 10.1*   HCT 33.2* 31.4*    150   MCV 81* 82   RDW 21.6* 21.2*        Chemistries:  Recent Labs   Lab 07/10/24  2211 07/11/24  0357 07/12/24  0403   * 131* 130*   K 4.1 4.3 4.4    100 99   CO2 22* 19* 19*   BUN  26* 31* 41*   CREATININE 3.3* 3.6* 4.7*   CALCIUM 7.7* 8.2* 8.0*   ALBUMIN 2.4* 2.5* 2.2*   PROT  --  5.5* 5.1*   BILITOT  --  1.3* 1.1*   ALKPHOS  --  175* 152*   ALT  --  72* 59*   AST  --  76* 53*   MG 1.8 2.4 2.3   PHOS 2.5* 3.5 5.0*       All pertinent labs within the past 24 hours have been reviewed.    Significant Imaging:  I have reviewed all pertinent imaging results/findings within the past 24 hours.    ABG  Recent Labs   Lab 07/09/24  0904   PH 7.281*   PO2 42   PCO2 47.0*   HCO3 22.1*   BE -5*     Assessment/Plan:     Cardiac/Vascular  Atrial fibrillation with RVR  AFib with RVR appears new following cardiac arrest at OSH. Per chart review, AFib was difficult to control. Patient endorses amiodarone reaction with itching, rash, and oral burning. Refused Sotalol, DCCV, and AICD placement at OSH. Was placed on Diltizem infusion and transitioned to PO metoprolol. Amio gtt restarted overnight 7/11 s/t uncontrolled afib rvr.    - Transitioned from amio gtt to PO  - Heparin gtt initiated for AC  - Daily CMP  - Keep K>4, Mag >2    Acute on chronic combined systolic and diastolic heart failure  Echo    Left Ventricle: The left ventricle is moderately dilated. Normal wall thickness. Global hypokinesis present. There is severely reduced systolic function with a visually estimated ejection fraction of 20 - 25%.    Right Ventricle: Mild right ventricular enlargement. Wall thickness is normal. Right ventricle wall motion has global hypokinesis. Systolic function is moderately reduced.    Left Atrium: Left atrium is severely dilated.    Right Atrium: Right atrium is severely dilated.    Aortic Valve: The aortic valve is a trileaflet valve. There is mild aortic valve sclerosis.    Mitral Valve: There is mild regurgitation.    Tricuspid Valve: There is severe regurgitation.    Pulmonary Artery: There is moderate pulmonary hypertension. The estimated pulmonary artery systolic pressure is 61 mmHg.    IVC/SVC: Elevated  venous pressure at 15 mmHg.    Pericardium: There is a trivial circumferential effusion. No indication of cardiac tamponade.        Essential hypertension  - Changed home Carvedilol to Metoprolol 25mg BID per cardiology recs    Renal/  CKD stage 4 secondary to hypertension  Pt previously on PD. Unable to remove enough volume with PD catheter. Initially upgraded to MICU for emergent trialysis placement and CRRT. Now with tunneled dialysis catheter and awaiting HD trial.    - Nephrology following, appreciate recs  - Strict I/Os  - Renally dose meds  - Avoid nephrotoxic agents    Endocrine  Type 2 diabetes mellitus with microalbuminuria, without long-term current use of insulin  Latest A1C 6.8; on Metformin at home    - POC glucose  - Hypoglycemia protocol    Palliative Care  Palliative care encounter  - Palliative care following, continued GOC with family    ACP (advance care planning)  - Palliative care consulted, appreciate their assistance in clarification of GOC/POC with patient and family  - Family hopeful patient's mentation will improve and patient will be able to participate in GOC discussions       Critical Care Daily Checklist:    A: Awake: RASS Goal/Actual Goal:    Actual:     B: Spontaneous Breathing Trial Performed?     C: SAT & SBT Coordinated?  N/A                      D: Delirium: CAM-ICU Overall CAM-ICU: Negative   E: Early Mobility Performed? No   F: Feeding Goal:    Status:     Current Diet Order   Procedures    Diet diabetic Renal, Consistent Carbohydrate, Low Phosphorus, Low Potassium; 2000 Calorie     Order Specific Question:   Additional Diet Options:     Answer:   Renal     Order Specific Question:   Additional Diet Options:     Answer:   Consistent Carbohydrate     Order Specific Question:   Additional Diet Options:     Answer:   Low Phosphorus     Order Specific Question:   Additional Diet Options:     Answer:   Low Potassium     Order Specific Question:   Total calories:     Answer:   2000  Calorie      AS: Analgesia/Sedation Tylenol PRN   T: Thromboembolic Prophylaxis Heparin gtt   H: HOB > 300 Yes   U: Stress Ulcer Prophylaxis (if needed) N/A   G: Glucose Control 140-180   B: Bowel Function Stool Occurrence: 1   I: Indwelling Catheter (Lines & Damon) Necessity Tunneled cath, Midline   D: De-escalation of Antimicrobials/Pharmacotherapies N/A    Plan for the day/ETD HD trial    Code Status:  Family/Goals of Care: Full Code         I have spent 50 min with this patient, with over 50% of this time spent coordinating care and speaking with the family       Medina Clayton [facility]  Critical Care Medicine  [facility] - Cardiac Medical ICU

## 2024-07-12 NOTE — ASSESSMENT & PLAN NOTE
74-year-old female with DMT2, ESRD on HD, pAF, CAD s/p PCI and HFrEF is admitted as a transfer for acute HFrEF in setting of CKD5 and recent PEA arrest during dialysis. Recent PD catheter placement on 6/4 but concerned for a possibly leak from cath. Unable to initiate dialysis for 2 weeks PTA to OSH due to insurance issues. Hospital course complicated by infected tunneled cath removed on 6/30. Transferred here for higher level of care. Initial plan at OSH was to replace tunneled catheter on the left side due to concerns to that right side may not by suitable. Peritoneal fluid studies negative for peritonitis. PD was initiated here in hospital however we were unable to remove significant amounts of fluid, palliative team was consulted. At this time the daughter wishes to proceed with HD.     Plan:  - Tunnel cath placed 7/11  - HD today - d/w family this am, they have agreed to proceed  - Trend RFP; replete electrolytes PRN for goal K > 4, Phos > 3, Mg > 2  - Strict I&Os  - Avoid nephrotoxic agents  - Renally adjust medications

## 2024-07-12 NOTE — HPI
Juhi Taylor 74-year-old female with morbid obesity, CKD stage 5 s/p PD catheter placement 6/4, RCC s/p right nephrectomy, T2DM, CAD s/p PCI to Lcx and LAD in 11/2020 and HFmREF who is admitted as a transfer from [facility] for higher level of care and further cardiac evaluation. She initially presented on 6/18/24 to [facility] for hypervolemia in the setting of CKD 4-5 for which she had undergone elective PD catheter placement complicated by catheter site leaking. She had a complex hospital course and required placement of tunneled line for HD with volume removal. During her second dialysis session she went into PEA arrest for which she was resuscitated, intubated and transferred to ICU. She was noted to have elevated troponin and subsequent TTE found reduced EF from 40-45% to 20-25% for which coronary angiography was recommended. Her hospital course was also complicated by dialysis line infection, significant bleeding around the catheter site, bradycardia and subsequent atrial fibrillation with RVR. She was ultimately transferred to [facility] for higher level of care and admitted to MICU on 7/8 for urgent dialysis line placement and initiated on HD. She has since stabilized and is approaching step down status for the floor. She did not tolerate PD dialysis following placement.     General surgery now consulted for PD catheter removal. She is alert and oriented and stable for step down.

## 2024-07-12 NOTE — CONSULTS
NIAS consulted for additional IV access.  Only able to use RUE due to left arm limb restriction.  I was unable to locate vein for PIV.  RN notified.

## 2024-07-12 NOTE — PLAN OF CARE
MICU DAILY GOALS     Family/Goals of care/Code Status   Code Status: Full Code    24H Vital Sign Range  Temp:  [97 °F (36.1 °C)-97.9 °F (36.6 °C)]   Pulse:  []   Resp:  [16-22]   BP: ()/(53-88)   SpO2:  [90 %-100 %]      Shift Events (include procedures and significant events)   HD cath not working. HD nurse placed cath niecy and dwell time is 4hrs. Will try HD tonight. Heparin gtt started. Gen surg consulted for PD cath removal. PT/OT eval//treat orders placed.     AWAKE RASS: Goal -    Actual - RASS (Jha Agitation-Sedation Scale): drowsy    Restraint necessity: Not necessary   BREATHE SBT: Not intubated    Coordinate A & B, analgesics/sedatives Pain: managed   SAT: Not intubated   Delirium CAM-ICU: Overall CAM-ICU: Negative   Early(intubated/ Progressive (non-intubated) Mobility MOVE Screen (INTUBATED ONLY): Not intubated    Activity: Activity Management: Arm raise - L1, Rolling - L1   Feeding/Nutrition Diet order: Diet/Nutrition Received: consistent carb/diabetic diet, Specialty Diet/Nutrition Received: renal diet   Thrombus DVT prophylaxis: VTE Required Core Measure: Pharmacological prophylaxis initiated/maintained   HOB Elevation Head of Bed (HOB) Positioning: HOB elevated   Ulcer Prophylaxis GI: yes   Glucose control managed Glycemic Management: blood glucose monitored   Skin Skin assessed during: Daily Assessment    Sacrum intact/not altered? Yes  Heels intact/not altered? Yes  Surgical wound? No    CHECK ONE!   (no altered skin or altered skin) and sub boxes:  [x] No Altered Skin Integrity Present    [x]Prevention Measures Documented    [] Altered Skin Integrity Present or Discovered   [] LDA present in EPIC, daily doc completed              [] LDA added if not in EPIC (describe wound).                    When describing wound, do not stage, use descriptive words only.    [] Wound Image Taken (required on admit,                   transfer/discharge and every Tuesday)    Wound Care Consulted?  No    4 EYES:  Attending Nurse (1st set of eyes):     Second RN/Staff Member (2nd set of eyes):    Bowel Function no issues    Indwelling Catheter Necessity      [REMOVED] Trialysis (Dialysis) Catheter 07/08/24 2210 left internal jugular-Line Necessity Review: CRRT/HD       Hemodialysis Catheter 07/11/24 1219 left subclavian-Line Necessity Review: CRRT/HD     De-escalation Antibiotics No        VS and assessment per flow sheet, patient progressing towards goals as tolerated, plan of care reviewed with family and patient, all concerns addressed, will continue to monitor.

## 2024-07-12 NOTE — PROGRESS NOTES
Cath niecy instilled both lumens per order.  Both lumens labeled pink.  Medication to stay in the cathter for 4 hrs.  Both lumens labeled pink.   Notified primary nurse.

## 2024-07-12 NOTE — PROCEDURES
5F, 10 cm PIV placed in RIGHT upper arm under ultrasound guidance using seldinger technique on ONE attempt. IV placed under full sterile conditions. + blood return, flushes easily. Secured in place.     RANDI Gillis, MSN, [facility]  Pulmonary Critical Care Medicine   07/12/2024

## 2024-07-12 NOTE — PROGRESS NOTES
Arrived patient's room for bedside HD  Received report from primary nurse  Vital signs stable.  HD TX started via left subclavian HD catheter.  BFR  at 150 , high arterial alarm on the  machine  Notified Dr. Landa,  Ordered to rinse back patient, and to instill cath niecy both lumens.

## 2024-07-12 NOTE — CHAPLAIN
Staff  Visit by Jefferson Deleon (ext. [id number])    Patient: Juhi Taylor  MRN: [id number]  : 1950  Age: 74 y.o.  Legal sex: female   Hospital Length of Stay: 9 days  Code Status: Full Code   Attending Provider: Kristin Alvarado MD  Principal Problem: ESRD (end stage renal disease)  Patient's Hoahaoism: Non-Adventism  Length of my visit: 15    What prompted this initial visit?: General Rounding    Who was present during my visit: Patient and family. Sister was at bedside    Feelings (Emotions/Fears/Experiences/Coping): Pt was partially sitting up in bed and awake when I visited her and her sister who was at bedside. Ms. Taylor reports that she is feeling better today than yesterday. She and her family are from [place] and have been there many generations. Pt was resting during my visit but did talk with me some. Neither pt nor sister were observed to be in any obvious distress and seem to be coping appropriately at present. They appreciated the visit and welcome spiritual care visits.       Family/Friends (Support, Community, Culture): Pt enjoys the support of a large group of friends and family who are local to her in [place].      Fely (Beliefs/Meaning/Philosophy): Mormon (non-Evangelical). Ms. Taylor's fely is very important to her as is a source of strength, comfort, hope, and peace.    Future (Spiritual Care Plan of Action): I will continue to provide support for Ms. Taylor and her family while she is on MICU. I educated the patient and family regarding the services offered by the Spiritual Care team and told them a  was in the building at all hours to offer them support. No needs were expressed during this visit.     To [facility] Staff: My SpectraLink is [phone] and my work hours are below. Don't hesitate to call me if Ms. Taylor or her family needs support. If I am unavailable, another staff  will gladly visit to offer support as needed. To reach them, please dial  [phone] which is carried by an in-house designated  at all hours.     A Spiritual Care Team  is in the building and rounding on patients 24/7. To reach them, call [phone]        Rev. Jefferson Deleon M.Div.  Staff   Spiritual Care Department  [facility]    My SpectraLink: [id number]  Office: [phone]  Email: [email or web address]  Work hours: M-F [phone]    The care I provide is shaped by this Code of Ethics.       07/12/24 1133   Clinical Encounter Type   Visit Type Initial Visit   Visit Category Critical Care   Visited With Patient and family together   Number of Family Visited 1   Length of Visit 15 Minutes   Patient Spiritual Encounters   Comments - Patient See note

## 2024-07-12 NOTE — PT/OT/SLP PROGRESS
Speech Language Pathology Treatment    Patient Name:  Juhi Taylor   MRN:  [id number]  Admitting Diagnosis: ESRD (end stage renal disease)    Recommendations:                 General Recommendations:  Dysphagia therapy  Diet recommendations:  Regular Diet - IDDSI Level 7, Liquid Diet Level: Thin liquids - IDDSI Level 0   Aspiration Precautions: Small bites/sips and Standard aspiration precautions   General Precautions: Standard, aspiration, fall  Communication strategies:  none    Assessment:     Juhi Taylor is a 74 y.o. female with an SLP diagnosis of Dysphagia.      Subjective     Awake/alert    Pain/Comfort:  Pain Rating 1: 0/10  Pain Rating Post-Intervention 1: 0/10    Respiratory Status: Room air    Objective:     Has the patient been evaluated by SLP for swallowing?   Yes  Keep patient NPO? No     Pt repositioned upright in bed for PO trials. Voice continues to be slightly hoarse, but overall increased intensity. She tolerated thin liquids x4 oz via straw, puree x1, and cracker x3 with timely swallow initiation and no overt s/s of airway compromise. SLP reviewed diet recs and swallow precautions with pt who verbalized understanding. Recommend regular diet/thin liquids at this time. SLP will follow up.     Goals:   Multidisciplinary Problems       SLP Goals          Problem: SLP    Goal Priority Disciplines Outcome   SLP Goal     SLP    Description: Speech Language Pathology Goals  Goals expected to be met by 7/18    1. Pt will participate in ongoing swallow assessment                               Plan:     Patient to be seen:  4 x/week   Plan of Care reviewed with:  patient, sibling   SLP Follow-Up:  Yes       Discharge recommendations:    likely no ST     Time Tracking:     SLP Treatment Date:   07/12/24  Speech Start Time:  1112  Speech Stop Time:  1120     Speech Total Time (min):  8 min    Billable Minutes: Treatment Swallowing Dysfunction 8    07/12/2024

## 2024-07-12 NOTE — SUBJECTIVE & OBJECTIVE
Interval History/Significant Events: New midline placed overnight. Transitioned amio from IV to PO; added Heparin gtt for afib AC.    Review of Systems   Unable to perform ROS: Acuity of condition     Objective:     Vital Signs (Most Recent):  Temp: 97.5 °F (36.4 °C) (07/12/24 1501)  Pulse: 101 (07/12/24 1702)  Resp: 20 (07/12/24 1702)  BP: 117/75 (07/12/24 1702)  SpO2: 99 % (07/12/24 1702) Vital Signs (24h Range):  Temp:  [97 °F (36.1 °C)-97.9 °F (36.6 °C)] 97.5 °F (36.4 °C)  Pulse:  [] 101  Resp:  [16-22] 20  SpO2:  [90 %-100 %] 99 %  BP: ()/(53-88) 117/75   Weight: 128.5 kg (283 lb 4.7 oz)  Body mass index is 44.37 kg/m².      Intake/Output Summary (Last 24 hours) at 7/12/2024 1718  Last data filed at 7/12/2024 1700  Gross per 24 hour   Intake 254.02 ml   Output --   Net 254.02 ml          Physical Exam  Vitals and nursing note reviewed.   Constitutional:       General: She is awake.      Appearance: She is ill-appearing.      Comments: Lethargic   HENT:      Mouth/Throat:      Mouth: Mucous membranes are moist.      Pharynx: Oropharynx is clear.   Eyes:      Pupils: Pupils are equal, round, and reactive to light.   Cardiovascular:      Rate and Rhythm: Tachycardia present. Rhythm irregular.      Pulses: Normal pulses.      Comments: Afib  Pulmonary:      Effort: Pulmonary effort is normal.      Breath sounds: Normal breath sounds.   Chest:      Comments: Tunneled cath present  Abdominal:      Palpations: Abdomen is soft.      Comments: PD catheter   Musculoskeletal:         General: Swelling present. Normal range of motion.      Right lower leg: Edema present.      Left lower leg: Edema present.   Skin:     General: Skin is warm and dry.      Capillary Refill: Capillary refill takes less than 2 seconds.   Neurological:      General: No focal deficit present.      Mental Status: She is oriented to person, place, and time. She is lethargic.      GCS: GCS eye subscore is 3. GCS verbal subscore is 5. GCS  motor subscore is 6.   Psychiatric:         Behavior: Behavior is cooperative.          Vents:  Oxygen Concentration (%): 28 (07/05/24 2320)  Lines/Drains/Airways       Central Venous Catheter Line  Duration                  Hemodialysis Catheter 07/11/24 1219 left subclavian 1 day              Peripheral Intravenous Line  Duration                  Midline Catheter - Single Lumen 07/12/24 0430 Right basilic vein (medial side of arm) <1 day                  Significant Labs:    CBC/Anemia Profile:  Recent Labs   Lab 07/11/24  0357 07/12/24  0403   WBC 14.49* 11.08   HGB 10.8* 10.1*   HCT 33.2* 31.4*    150   MCV 81* 82   RDW 21.6* 21.2*        Chemistries:  Recent Labs   Lab 07/10/24  2211 07/11/24  0357 07/12/24  0403   * 131* 130*   K 4.1 4.3 4.4    100 99   CO2 22* 19* 19*   BUN 26* 31* 41*   CREATININE 3.3* 3.6* 4.7*   CALCIUM 7.7* 8.2* 8.0*   ALBUMIN 2.4* 2.5* 2.2*   PROT  --  5.5* 5.1*   BILITOT  --  1.3* 1.1*   ALKPHOS  --  175* 152*   ALT  --  72* 59*   AST  --  76* 53*   MG 1.8 2.4 2.3   PHOS 2.5* 3.5 5.0*       All pertinent labs within the past 24 hours have been reviewed.    Significant Imaging:  I have reviewed all pertinent imaging results/findings within the past 24 hours.

## 2024-07-12 NOTE — ASSESSMENT & PLAN NOTE
AFib with RVR appears new following cardiac arrest at OSH. Per chart review, AFib was difficult to control. Patient endorses amiodarone reaction with itching, rash, and oral burning. Refused Sotalol, DCCV, and AICD placement at OSH. Was placed on Diltizem infusion and transitioned to PO metoprolol. Amio gtt restarted overnight 7/11 s/t uncontrolled afib rvr.    - Transitioned from amio gtt to PO  - Heparin gtt initiated for AC  - Daily CMP  - Keep K>4, Mag >2

## 2024-07-12 NOTE — ANESTHESIA POSTPROCEDURE EVALUATION
Anesthesia Post Evaluation    Patient: Juhi Taylor    Procedure(s) Performed: Procedure(s) (LRB):  Insertion, Catheter, Central Venous, Hemodialysis (Right)  REMOVAL, CATHETER, HEMODIALYSIS    Final Anesthesia Type: MAC      Patient location during evaluation: ICU  Patient participation: Yes- Able to Participate  Level of consciousness: awake  Post-procedure vital signs: reviewed and stable  Pain management: adequate  Airway patency: patent    PONV status at discharge: No PONV  Anesthetic complications: no      Cardiovascular status: hemodynamically stable  Respiratory status: spontaneous ventilation  Hydration status: hypervolemic  Follow-up not needed.              Vitals Value Taken Time   /83 07/12/24 0601   Temp 36.3 °C (97.4 °F) 07/12/24 0300   Pulse 102 07/12/24 0634   Resp 19 07/12/24 0634   SpO2 94 % 07/12/24 0634   Vitals shown include unfiled device data.      No case tracking events are documented in the log.      Pain/Kg Score: No data recorded

## 2024-07-12 NOTE — PLAN OF CARE
MICU DAILY GOALS     Family/Goals of care/Code Status   Code Status: Full Code    24H Vital Sign Range  Temp:  [97 °F (36.1 °C)-98.5 °F (36.9 °C)]   Pulse:  []   Resp:  [16-22]   BP: (101-158)/(53-99)   SpO2:  [98 %-100 %]      Shift Events (include procedures and significant events)   No acute events throughout shift. Amio gtt infusing.     AWAKE RASS: Goal -    Actual - RASS (Jha Agitation-Sedation Scale): drowsy    Restraint necessity: Not necessary   BREATHE SBT: Not intubated    Coordinate A & B, analgesics/sedatives Pain: managed   SAT: Not intubated   Delirium CAM-ICU: Overall CAM-ICU: Positive   Early(intubated/ Progressive (non-intubated) Mobility MOVE Screen (INTUBATED ONLY): Not intubated    Activity: Activity Management: Rolling - L1   Feeding/Nutrition Diet order: Diet/Nutrition Received: mechanical/dental soft, Specialty Diet/Nutrition Received: renal diet   Thrombus DVT prophylaxis: VTE Required Core Measure: Pharmacological prophylaxis initiated/maintained   HOB Elevation Head of Bed (HOB) Positioning: HOB at 30-45 degrees   Ulcer Prophylaxis GI: yes   Glucose control managed Glycemic Management: blood glucose monitored   Skin Skin assessed during: Daily Assessment    Sacrum intact/not altered? Yes  Heels intact/not altered? Yes  Surgical wound? Yes    CHECK ONE!   (no altered skin or altered skin) and sub boxes:  [x] No Altered Skin Integrity Present    []Prevention Measures Documented    [] Altered Skin Integrity Present or Discovered   [] LDA present in EPIC, daily doc completed              [] LDA added if not in EPIC (describe wound).                    When describing wound, do not stage, use descriptive words only.    [] Wound Image Taken (required on admit,                   transfer/discharge and every Tuesday)    Wound Care Consulted? No    4 EYES:  Attending Nurse (1st set of eyes): MANUEL Flor     Second RN/Staff Member (2nd set of eyes): Manuel Sánchez    Bowel Function no issues     Indwelling Catheter Necessity      [REMOVED] Trialysis (Dialysis) Catheter 07/08/24 2210 left internal jugular-Line Necessity Review: CRRT/HD     De-escalation Antibiotics No        VS and assessment per flow sheet, patient progressing towards goals as tolerated, plan of care reviewed with  pt and sibling. , all concerns addressed.

## 2024-07-12 NOTE — ASSESSMENT & PLAN NOTE
Pt previously on PD. Unable to remove enough volume with PD catheter. Initially upgraded to MICU for emergent trialysis placement and CRRT. Now with tunneled dialysis catheter and awaiting HD trial.    - Nephrology following, appreciate recs  - Strict I/Os  - Renally dose meds  - Avoid nephrotoxic agents

## 2024-07-12 NOTE — SUBJECTIVE & OBJECTIVE
No current facility-administered medications on file prior to encounter.     Current Outpatient Medications on File Prior to Encounter   Medication Sig    allopurinoL (ZYLOPRIM) 300 MG tablet Take 1 tablet (300 mg total) by mouth once daily.    calcitRIOL (ROCALTROL) 0.5 MCG Cap Take 0.5 mcg by mouth once daily.    carvediloL (COREG) 25 MG tablet Take 1 tablet (25 mg total) by mouth 2 (two) times daily.    cloNIDine 0.1 mg/24 hr td ptwk (CATAPRES) 0.1 mg/24 hr Place 1 patch onto the skin every 7 days.    ELIQUIS 5 mg Tab TAKE 1 TABLET(5 MG) BY MOUTH TWICE DAILY    evolocumab (REPATHA SURECLICK) 140 mg/mL PnIj Inject 1 mL (140 mg total) into the skin every 14 (fourteen) days.    furosemide (LASIX) 40 MG tablet Take 40 mg by mouth once daily.    amLODIPine (NORVASC) 10 MG tablet Take 10 mg by mouth once daily.    blood sugar diagnostic (TRUE METRIX GLUCOSE TEST STRIP) Strp Use 1x daily. Insurance preferred.    blood sugar diagnostic Strp To check BG 1 time daily, to use with insurance preferred meter    cloNIDine (CATAPRES) 0.1 MG tablet Take 1 tablet (0.1 mg total) by mouth 3 (three) times daily as needed (PRN SBP > 165 mmHg).    cyanocobalamin (VITAMIN B-12) 100 MCG tablet Take 100 mcg by mouth once daily.    lancets Misc To check BG 1 times daily, to use with insurance preferred meter    loratadine (CLARITIN) 10 mg tablet Take 10 mg by mouth once daily.    [DISCONTINUED] aspirin (ECOTRIN) 81 MG EC tablet Take 1 tablet (81 mg total) by mouth once daily.    [DISCONTINUED] DULoxetine (CYMBALTA) 20 MG capsule TAKE ONE CAPSULE BY MOUTH ONCE DAILY    [DISCONTINUED] folic acid (FOLVITE) 1 MG tablet Take 1 mg by mouth once daily.    [DISCONTINUED] metFORMIN (GLUCOPHAGE-XR) 500 MG ER 24hr tablet Take 2 tablets (1,000 mg total) by mouth 2 (two) times daily with meals.    [DISCONTINUED] sodium bicarbonate 650 MG tablet Take 1 tablet (650 mg total) by mouth once daily.       Review of patient's allergies indicates:   Allergen  Reactions    Percocet [oxycodone-acetaminophen] Itching    Amiodarone analogues      Itching      Irbesartan Swelling    Januvia [sitagliptin]     Jardiance [empagliflozin]      Leg cramps    Lipitor [atorvastatin] Other (See Comments)     Severe leg pain    Linaclotide Other (See Comments) and Nausea And Vomiting     Does not remember    Lubiprostone Other (See Comments) and Palpitations     Does not remember       Past Medical History:   Diagnosis Date    Anticoagulant long-term use     Arthritis     Breast cancer 2014    invasive lobular carcinoma    Cancer of kidney 11/2020    RIGHT KIDNEY CANCER    CHF (congestive heart failure)     Coronary artery disease dx 2005    Depression     Diabetes mellitus     Diastolic heart failure secondary to hypertension     Gout     Hyperlipemia     Hypertension     Hypertrophy of nasal turbinates     Kidney mass 2020    Right    Levoscoliosis     Lung nodule     left    Multiple thyroid nodules     NICOLE (obstructive sleep apnea)     uses C-PAP    Pulmonary hypertension     Severe sepsis 07/01/2024     Past Surgical History:   Procedure Laterality Date    AORTOGRAPHY N/A 12/04/2020    Procedure: Aortogram;  Surgeon: Paul Pedersen MD;  Location: [facility] CATH;  Service: Cardiology;  Laterality: N/A;    BREAST SURGERY      CARDIAC CATHETERIZATION  12/2020    CHOLECYSTECTOMY      COLONOSCOPY      multi -last 2014     CORONARY ARTERY BYPASS GRAFT      ESOPHAGOGASTRODUODENOSCOPY      2012     HAND SURGERY Right     INSERTION OF CATHETER N/A 6/23/2024    Procedure: Insertion,catheter;  Surgeon: Meli Griffin MD;  Location: [facility] OR;  Service: Vascular;  Laterality: N/A;  ORIGINAL CATHETER WAS REPOSITIONED.  NO NEW CATHETER WAS PLACED    INSERTION OF TUNNELED CENTRAL VENOUS HEMODIALYSIS CATHETER Right 7/11/2024    Procedure: Insertion, Catheter, Central Venous, Hemodialysis;  Surgeon: Hawa Landa MD;  Location: [facility] CATH LAB;  Service: Interventional Nephrology;  Laterality: Right;  "   INSERTION, CATHETER, DIALYSIS, PERITONEAL N/A 2024    Procedure: IN Insertion Catheter, Dialysis, Peritoneal - laparoscopic;  Surgeon: Rodriguez Catalan Jr., MD;  Location: [facility] OR;  Service: General;  Laterality: N/A;    LAPAROSCOPIC ROBOT-ASSISTED SURGICAL REMOVAL OF KIDNEY USING DA CHELLE XI Right 03/10/2022    Procedure: XI ROBOTIC NEPHRECTOMY- radical;  Surgeon: Rolando Ramirez MD;  Location: [facility] OR;  Service: Urology;  Laterality: Right;    MASTECTOMY W/ SENTINEL NODE BIOPSY Bilateral 2015    bilateral "dog ears"    NASAL SINUS SURGERY      Dr Bryant FESS/cauterization turbinate     PARTIAL HYSTERECTOMY      PERCUTANEOUS TRANSLUMINAL BALLOON ANGIOPLASTY OF CORONARY ARTERY  2020    Procedure: Angioplasty-coronary;  Surgeon: Paul Pedersen MD;  Location: [facility] CATH;  Service: Cardiology;;    REMOVAL OF HEMODIALYSIS CATHETER  2024    Procedure: REMOVAL, CATHETER, HEMODIALYSIS;  Surgeon: Hawa Landa MD;  Location[facility] LAB;  Service: Interventional Nephrology;;    RENAL BIOPSY Right     2021 EJ    TUBAL LIGATION      ULTRASOUND GUIDANCE  2020    Procedure: Ultrasound Guidance;  Surgeon: Paul Pedersen MD;  Location: [facility] CATH;  Service: Cardiology;;     Family History       Problem Relation (Age of Onset)    Asthma Daughter    Birth defects Daughter    Breast cancer Mother, Maternal Aunt    Depression Daughter    Drug abuse Daughter, Daughter    Glaucoma Sister    Hepatitis Brother    Hypertension Father    Learning disabilities Daughter    Mental illness Daughter    Stroke Father          Tobacco Use    Smoking status: Former     Current packs/day: 0.00     Types: Cigarettes     Start date: 2016     Quit date: 2016     Years since quittin.5    Smokeless tobacco: Never    Tobacco comments:     quit    Substance and Sexual Activity    Alcohol use: No    Drug use: No    Sexual activity: Not Currently     Partners: Male     Birth " control/protection: None     Review of Systems   Constitutional:  Positive for fatigue. Negative for activity change and appetite change.   HENT: Negative.     Respiratory: Negative.     Gastrointestinal:  Negative for abdominal distention and abdominal pain.   Genitourinary:  Positive for decreased urine volume.   Musculoskeletal: Negative.    Skin: Negative.    All other systems reviewed and are negative.    Objective:     Vital Signs (Most Recent):  Temp: 97.5 °F (36.4 °C) (07/12/24 1501)  Pulse: 107 (07/12/24 1802)  Resp: (!) 22 (07/12/24 1802)  BP: 107/87 (07/12/24 1802)  SpO2: 97 % (07/12/24 1802) Vital Signs (24h Range):  Temp:  [97 °F (36.1 °C)-97.9 °F (36.6 °C)] 97.5 °F (36.4 °C)  Pulse:  [] 107  Resp:  [16-22] 22  SpO2:  [90 %-100 %] 97 %  BP: ()/(53-88) 107/87     Weight: 128.5 kg (283 lb 4.7 oz)  Body mass index is 44.37 kg/m².     Physical Exam  Vitals and nursing note reviewed.   Constitutional:       Appearance: She is obese. She is ill-appearing.   HENT:      Head: Normocephalic and atraumatic.   Neck:      Comments: Left neck trialysis catheter   Cardiovascular:      Rate and Rhythm: Normal rate and regular rhythm.   Pulmonary:      Effort: Pulmonary effort is normal. No respiratory distress.   Abdominal:      General: Abdomen is flat. There is no distension.      Palpations: Abdomen is soft. There is no mass.      Tenderness: There is no abdominal tenderness.      Hernia: No hernia is present.      Comments: Large pannus; PD catheter in place to left lower side of the pannus, clean and dry   Skin:     General: Skin is warm and dry.   Neurological:      General: No focal deficit present.      Mental Status: She is alert. Mental status is at baseline.   Psychiatric:         Mood and Affect: Mood normal.            I have reviewed all pertinent lab results within the past 24 hours.    Significant Diagnostics:  I have reviewed all pertinent imaging results/findings within the past 24  hours.

## 2024-07-12 NOTE — SUBJECTIVE & OBJECTIVE
Interval History: Mentation fairly good this morning, although she still appears lethargic and spontaneously falls asleep during our conversation. D/w family at bedside.     Review of patient's allergies indicates:   Allergen Reactions    Percocet [oxycodone-acetaminophen] Itching    Amiodarone analogues      Itching      Irbesartan Swelling    Januvia [sitagliptin]     Jardiance [empagliflozin]      Leg cramps    Lipitor [atorvastatin] Other (See Comments)     Severe leg pain    Linaclotide Other (See Comments) and Nausea And Vomiting     Does not remember    Lubiprostone Other (See Comments) and Palpitations     Does not remember     Current Facility-Administered Medications   Medication Frequency    acetaminophen tablet 650 mg Q6H PRN    albuterol sulfate nebulizer solution 2.5 mg Q4H PRN    amiodarone 360 mg/200 mL (1.8 mg/mL) infusion Continuous    aspirin EC tablet 81 mg Daily    carvediloL tablet 25 mg BID    dextromethorphan-guaiFENesin  mg/5 ml liquid 5 mL Q4H PRN    dextrose 10% bolus 125 mL 125 mL PRN    dextrose 10% bolus 250 mL 250 mL PRN    gentamicin 0.1 % ointment TID    glucagon (human recombinant) injection 1 mg PRN    glucose chewable tablet 16 g PRN    glucose chewable tablet 24 g PRN    naloxone 0.4 mg/mL injection 0.02 mg PRN    ondansetron injection 4 mg Q6H PRN    simethicone chewable tablet 80 mg TID PRN    sodium chloride 0.9% flush 10 mL PRN       Objective:     Vital Signs (Most Recent):  Temp: 97.4 °F (36.3 °C) (07/12/24 0300)  Pulse: 106 (07/12/24 0600)  Resp: 18 (07/12/24 0600)  BP: 125/83 (07/12/24 0600)  SpO2: 99 % (07/12/24 0600) Vital Signs (24h Range):  Temp:  [97 °F (36.1 °C)-97.7 °F (36.5 °C)] 97.4 °F (36.3 °C)  Pulse:  [] 106  Resp:  [16-22] 18  SpO2:  [98 %-100 %] 99 %  BP: (101-153)/(53-91) 125/83     Weight: 128.5 kg (283 lb 4.7 oz) (07/08/24 2000)  Body mass index is 44.37 kg/m².  Body surface area is 2.46 meters squared.    I/O last 3 completed shifts:  In:  2053 [I.V.:1671; IV Piggyback:382]  Out: 1976 [Other:1976]     Physical Exam  Constitutional:       General: She is not in acute distress.     Appearance: Normal appearance. She is obese.   HENT:      Head: Normocephalic and atraumatic.      Mouth/Throat:      Mouth: Mucous membranes are moist.      Pharynx: Oropharynx is clear.   Cardiovascular:      Rate and Rhythm: Normal rate and regular rhythm.      Heart sounds: Normal heart sounds.   Pulmonary:      Effort: Pulmonary effort is normal.      Breath sounds: Normal breath sounds.   Abdominal:      Palpations: Abdomen is soft.   Musculoskeletal:      Right lower leg: Edema (2+) present.      Left lower leg: Edema (2+) present.   Skin:     General: Skin is warm and dry.   Neurological:      General: No focal deficit present.      Mental Status: She is alert. She is disoriented.      Motor: Weakness present.          Significant Labs:  All labs within the past 24 hours have been reviewed.     Significant Imaging:  All imaging with the past 24 hours have been reviewed.

## 2024-07-12 NOTE — PROGRESS NOTES
[facility]  Nephrology  Progress Note    Patient Name: Juhi Taylor  MRN: [id number]  Admission Date: 7/3/2024  Hospital Length of Stay: 9 days  Attending Provider: Kristin Alvarado MD   Primary Care Physician: Tony Sadler MD  Principal Problem:ESRD (end stage renal disease)    Subjective:     HPI: 74-year-old female with prior history of CKD stage 5 with recent PD catheter placement on 6/4, RCC s/p right nephrectomy, T2DM, obesity, CAD s/p PCI to Lcx and LAD in 11/2020 and HFmREF who is admitted as a transfer from [facility] for higher level of care and further cardiac evaluation. Nephrology consulted for hemodialysis.     She initially presented on 6/18/24 to [facility] for hypervolemia in the setting of CKD 4-5 for which she had undergone elective PD catheter placement complicated by catheter site leaking. She had a complex hospital course, was initially placed on furosemide gtt without significant improvement and later underwent placement of tunneled line for HD with volume removal. During her second dialysis session she went into PEA arrest for which she was resuscitated, intubated and transferred to ICU. She was noted to have elevated troponin and subsequent TTE found reduced EF from 40-45% to 20-25% for which coronary angiography was recommended. Her hospital course was also complicated by dialysis line infection, significant bleeding around the catheter site, bradycardia and subsequent atrial fibrillation with RVR. Given the catheter site bleeding, anticoagulation was temporarily held. She has reported allergy to amiodarone, was initially placed on diltiazem for atrial fibrillation despite reduced EF. She discussed GOC at outside hospital where it appears that she lacked capacity per note and decided to pursue higher level of care at [facility] [place]. At that time, gen surgery was planning to place a left tunneled catheter due to concerns of the right side not being suitable    Upon admit, patient  appeared drowsy but conversant. Daughter reports that she typically gets lethargic 2/2 to the volume overload due to not receiving dialysis. Patient reported b/l left lower extremity pain. Daughter concerned about her not having dialysis in 4 days and wants a session today.     Interval History: Mentation fairly good this morning, although she still appears lethargic and spontaneously falls asleep during our conversation. D/w family at bedside.     Review of patient's allergies indicates:   Allergen Reactions    Percocet [oxycodone-acetaminophen] Itching    Amiodarone analogues      Itching      Irbesartan Swelling    Januvia [sitagliptin]     Jardiance [empagliflozin]      Leg cramps    Lipitor [atorvastatin] Other (See Comments)     Severe leg pain    Linaclotide Other (See Comments) and Nausea And Vomiting     Does not remember    Lubiprostone Other (See Comments) and Palpitations     Does not remember     Current Facility-Administered Medications   Medication Frequency    acetaminophen tablet 650 mg Q6H PRN    albuterol sulfate nebulizer solution 2.5 mg Q4H PRN    amiodarone 360 mg/200 mL (1.8 mg/mL) infusion Continuous    aspirin EC tablet 81 mg Daily    carvediloL tablet 25 mg BID    dextromethorphan-guaiFENesin  mg/5 ml liquid 5 mL Q4H PRN    dextrose 10% bolus 125 mL 125 mL PRN    dextrose 10% bolus 250 mL 250 mL PRN    gentamicin 0.1 % ointment TID    glucagon (human recombinant) injection 1 mg PRN    glucose chewable tablet 16 g PRN    glucose chewable tablet 24 g PRN    naloxone 0.4 mg/mL injection 0.02 mg PRN    ondansetron injection 4 mg Q6H PRN    simethicone chewable tablet 80 mg TID PRN    sodium chloride 0.9% flush 10 mL PRN       Objective:     Vital Signs (Most Recent):  Temp: 97.4 °F (36.3 °C) (07/12/24 0300)  Pulse: 106 (07/12/24 0600)  Resp: 18 (07/12/24 0600)  BP: 125/83 (07/12/24 0600)  SpO2: 99 % (07/12/24 0600) Vital Signs (24h Range):  Temp:  [97 °F (36.1 °C)-97.7 °F (36.5 °C)] 97.4  °F (36.3 °C)  Pulse:  [] 106  Resp:  [16-22] 18  SpO2:  [98 %-100 %] 99 %  BP: (101-153)/(53-91) 125/83     Weight: 128.5 kg (283 lb 4.7 oz) (07/08/24 2000)  Body mass index is 44.37 kg/m².  Body surface area is 2.46 meters squared.    I/O last 3 completed shifts:  In: 2053 [I.V.:1671; IV Piggyback:382]  Out: 1976 [Other:1976]     Physical Exam  Constitutional:       General: She is not in acute distress.     Appearance: Normal appearance. She is obese.   HENT:      Head: Normocephalic and atraumatic.      Mouth/Throat:      Mouth: Mucous membranes are moist.      Pharynx: Oropharynx is clear.   Cardiovascular:      Rate and Rhythm: Normal rate and regular rhythm.      Heart sounds: Normal heart sounds.   Pulmonary:      Effort: Pulmonary effort is normal.      Breath sounds: Normal breath sounds.   Abdominal:      Palpations: Abdomen is soft.   Musculoskeletal:      Right lower leg: Edema (2+) present.      Left lower leg: Edema (2+) present.   Skin:     General: Skin is warm and dry.   Neurological:      General: No focal deficit present.      Mental Status: She is alert. She is disoriented.      Motor: Weakness present.          Significant Labs:  All labs within the past 24 hours have been reviewed.     Significant Imaging:  All imaging with the past 24 hours have been reviewed.  Assessment/Plan:     Renal/  CKD stage 4 secondary to hypertension  74-year-old female with DMT2, ESRD on HD, pAF, CAD s/p PCI and HFrEF is admitted as a transfer for acute HFrEF in setting of CKD5 and recent PEA arrest during dialysis. Recent PD catheter placement on 6/4 but concerned for a possibly leak from cath. Unable to initiate dialysis for 2 weeks PTA to OSH due to insurance issues. Hospital course complicated by infected tunneled cath removed on 6/30. Transferred here for higher level of care. Initial plan at OSH was to replace tunneled catheter on the left side due to concerns to that right side may not by suitable.  Peritoneal fluid studies negative for peritonitis. PD was initiated here in hospital however we were unable to remove significant amounts of fluid, palliative team was consulted. At this time the daughter wishes to proceed with HD.     Plan:  - Tunnel cath placed 7/11  - HD today - d/w family this am, they have agreed to proceed  - Recommend consult to surgery for removal of PD catheter  - Trend RFP; replete electrolytes PRN for goal K > 4, Phos > 3, Mg > 2  - Strict I&Os  - Avoid nephrotoxic agents  - Renally adjust medications        Thank you for your consult. I will follow-up with patient. Please contact us if you have any additional questions.    Paulo Alicea MD  Nephrology  [facility] - Cardiac Medical ICU

## 2024-07-12 NOTE — ASSESSMENT & PLAN NOTE
- Palliative care consulted, appreciate their assistance in clarification of GOC/POC with patient and family  - Family hopeful patient's mentation will improve and patient will be able to participate in GOC discussions

## 2024-07-13 NOTE — NURSING
Central line dressing completed using sterile technique. Alcohol used. Sutures intact. Biopatch and central line dressing in place. Dressing CDI.

## 2024-07-13 NOTE — PROGRESS NOTES
[facility]  Nephrology  Progress Note    Patient Name: Juhi Taylor  MRN: [id number]  Admission Date: 7/3/2024  Hospital Length of Stay: 10 days  Attending Provider: Kristin Alvarado MD   Primary Care Physician: Tony Sadler MD  Principal Problem:ESRD (end stage renal disease)    Subjective:     HPI: 74-year-old female with prior history of CKD stage 5 with recent PD catheter placement on 6/4, RCC s/p right nephrectomy, T2DM, obesity, CAD s/p PCI to Lcx and LAD in 11/2020 and HFmREF who is admitted as a transfer from [facility] for higher level of care and further cardiac evaluation. Nephrology consulted for hemodialysis.     She initially presented on 6/18/24 to [facility] for hypervolemia in the setting of CKD 4-5 for which she had undergone elective PD catheter placement complicated by catheter site leaking. She had a complex hospital course, was initially placed on furosemide gtt without significant improvement and later underwent placement of tunneled line for HD with volume removal. During her second dialysis session she went into PEA arrest for which she was resuscitated, intubated and transferred to ICU. She was noted to have elevated troponin and subsequent TTE found reduced EF from 40-45% to 20-25% for which coronary angiography was recommended. Her hospital course was also complicated by dialysis line infection, significant bleeding around the catheter site, bradycardia and subsequent atrial fibrillation with RVR. Given the catheter site bleeding, anticoagulation was temporarily held. She has reported allergy to amiodarone, was initially placed on diltiazem for atrial fibrillation despite reduced EF. She discussed GOC at outside hospital where it appears that she lacked capacity per note and decided to pursue higher level of care at [facility] [place]. At that time, gen surgery was planning to place a left tunneled catheter due to concerns of the right side not being suitable    Upon admit, patient  appeared drowsy but conversant. Daughter reports that she typically gets lethargic 2/2 to the volume overload due to not receiving dialysis. Patient reported b/l left lower extremity pain. Daughter concerned about her not having dialysis in 4 days and wants a session today.     Interval History:   Last HD done last night, clotting issue with HD, Cath flow,TPA, was placed for 4 hours in both ports. The patient had a dialysis session afterwards with good blood flow of 400ml/min.   Review of patient's allergies indicates:   Allergen Reactions    Percocet [oxycodone-acetaminophen] Itching    Amiodarone analogues      Itching      Irbesartan Swelling    Januvia [sitagliptin]     Jardiance [empagliflozin]      Leg cramps    Lipitor [atorvastatin] Other (See Comments)     Severe leg pain    Linaclotide Other (See Comments) and Nausea And Vomiting     Does not remember    Lubiprostone Other (See Comments) and Palpitations     Does not remember     Current Facility-Administered Medications   Medication Frequency    acetaminophen tablet 650 mg Q6H PRN    albuterol sulfate nebulizer solution 2.5 mg Q4H PRN    amiodarone tablet 400 mg BID    Followed by    [START ON 7/22/2024] amiodarone tablet 200 mg Daily    aspirin EC tablet 81 mg Daily    dextromethorphan-guaiFENesin  mg/5 ml liquid 5 mL Q4H PRN    dextrose 10% bolus 125 mL 125 mL PRN    dextrose 10% bolus 250 mL 250 mL PRN    gentamicin 0.1 % ointment TID    glucagon (human recombinant) injection 1 mg PRN    glucose chewable tablet 16 g PRN    glucose chewable tablet 24 g PRN    heparin 25,000 units in dextrose 5% (100 units/ml) IV bolus from bag LOW INTENSITY nomogram - [facility] PRN    heparin 25,000 units in dextrose 5% (100 units/ml) IV bolus from bag LOW INTENSITY nomogram - [facility] PRN    heparin 25,000 units in dextrose 5% 250 mL (100 units/mL) infusion LOW INTENSITY nomogram - [facility] Continuous    metoprolol tartrate (LOPRESSOR) tablet 25 mg BID    naloxone 0.4 mg/mL  injection 0.02 mg PRN    ondansetron injection 4 mg Q6H PRN    simethicone chewable tablet 80 mg TID PRN    sodium chloride 0.9% flush 10 mL PRN       Objective:     Vital Signs (Most Recent):  Temp: 97.2 °F (36.2 °C) (07/13/24 0701)  Pulse: (!) 116 (07/13/24 1000)  Resp: (!) 24 (07/13/24 1000)  BP: 118/73 (07/13/24 1000)  SpO2: (!) 90 % (07/13/24 1000) Vital Signs (24h Range):  Temp:  [97.1 °F (36.2 °C)-98.1 °F (36.7 °C)] 97.2 °F (36.2 °C)  Pulse:  [] 116  Resp:  [16-24] 24  SpO2:  [90 %-100 %] 90 %  BP: ()/(46-94) 118/73     Weight: 128.5 kg (283 lb 4.7 oz) (07/08/24 2000)  Body mass index is 44.37 kg/m².  Body surface area is 2.46 meters squared.    I/O last 3 completed shifts:  In: 305 [I.V.:252; IV Piggyback:53]  Out: 343 [Other:343]     Physical Exam  Constitutional:       General: She is not in acute distress.     Appearance: Normal appearance. She is obese.   HENT:      Head: Normocephalic and atraumatic.      Mouth/Throat:      Mouth: Mucous membranes are moist.      Pharynx: Oropharynx is clear.   Cardiovascular:      Rate and Rhythm: Normal rate and regular rhythm.      Heart sounds: Normal heart sounds.   Pulmonary:      Effort: Pulmonary effort is normal.      Breath sounds: Normal breath sounds.   Abdominal:      Palpations: Abdomen is soft.   Musculoskeletal:      Right lower leg: Edema (2+) present.      Left lower leg: Edema (2+) present.   Skin:     General: Skin is warm and dry.   Neurological:      General: No focal deficit present.      Mental Status: She is alert. She is disoriented.      Motor: Weakness present.          Significant Labs:  All labs within the past 24 hours have been reviewed.     Significant Imaging:  All imaging with the past 24 hours have been reviewed.  Assessment/Plan:     Cardiac/Vascular  Acute on chronic combined systolic and diastolic heart failure  Per primary    Renal/  * ESRD (end stage renal disease)  Nephrology consulted for inpatient-HD  management  admitted as a transfer for acute HFrEF in setting of CKD5 and recent PEA arrest during dialysis. Recent PD catheter placement on 6/4 but concerned for a possibly leak from cath. Unable to initiate dialysis for 2 weeks PTA to OSH due to insurance issues. Hospital course complicated by infected tunneled cath removed on 6/30. Transferred here for higher level of care. Initial plan at OSH was to replace tunneled catheter on the left side due to concerns to that right side may not by suitable. PD was initiated here in hospital however we were unable to remove significant amounts of fluid, At this time the daughter wishes to proceed with HD.       07/13  Tunnel cath placed 7/11  HD done last night, clotting issue with HD, Cath flow,TPA, was placed for 4 hours in both ports. The patient had a dialysis session afterwards with good blood flow of 400ml/min.       Plan:    Plan for hd on Monday   Per interventional nephrology recommendation -will need 5000IU/ml Heparin lock in each port with each dialysis session.   - use 1500 IU Heparin with the priming of the machine   - Renally adjust medications  - Pre and Post-HD weights   - Continue to monitor intake and output      Anemia of ESRD   Recent Labs   Lab 07/11/24  0357 07/12/24  0403 07/13/24  0342   WBC 14.49* 11.08 12.36   HGB 10.8* 10.1* 10.3*   HCT 33.2* 31.4* 32.9*    150 125*     Lab Results   Component Value Date    FESATURATED 10 (L) 01/22/2024    FERRITIN 141 01/22/2024       - Goal in ESRD is Hgb of 10-11.     Mineral Bone Disease in ESRD   Lab Results   Component Value Date    .7 (H) 05/08/2024    CALCIUM 8.0 (L) 07/13/2024    ALBUMIN 2.2 (L) 07/13/2024    CAION 1.36 04/14/2021    PHOS 4.6 (H) 07/13/2024              Thank you for your consult. I will follow-up with patient. Please contact us if you have any additional questions.    Spencer Frank MD  Nephrology  [facility] - [facility]    ATTENDING PHYSICIAN ATTESTATION  I have  personally verified the history and examined the patient. I thoroughly reviewed the demographic, clinical, laboratorial and imaging information available in medical records. I agree with the assessment and recommendations provided by the subspecialty resident who was under my supervision.

## 2024-07-13 NOTE — ASSESSMENT & PLAN NOTE
74 year old F w/ above history now tolerating HD, consult to gen surg for PD catheter removal.     - Plan for PD catheter removal in OR tomorrow   - Consent obtained. Placed in chart.   - NPO at midnight, hold heparin at midnight

## 2024-07-13 NOTE — ASSESSMENT & PLAN NOTE
AFib with RVR appears new following cardiac arrest at OSH. Per chart review, AFib was difficult to control. Patient endorses amiodarone reaction with itching, rash, and oral burning. Refused Sotalol, DCCV, and AICD placement at OSH. Was placed on Diltizem infusion and transitioned to PO metoprolol. Amio gtt restarted overnight 7/11 s/t uncontrolled afib rvr. Transitioned to PO amio.    - Heparin gtt initiated for AC  - Daily CMP  - Keep K>4, Mag >2

## 2024-07-13 NOTE — CARE UPDATE
Interventional Nephrology:    I was called yesterday for difficulty on using the Lt IJ cuffed tunneled HD catheter. With low flow of only 150ml/min and high arterial pressure. The patient was rinsed back.    Portable chest X ray showed no angulation in the catheter but it was retracted to the Brachiocephalic vein.       Cath flow,TPA, was placed for 4 hours in both ports. The patient had a dialysis session afterwards with good blood flow of  400ml/min. The venous and arterial pressures were acceptable 110-220mmHg. The venous chamber clotted and caused the session to be terminated 30 min less than the required time.    The patient will need 5000IU/ml Heparin lock in each port to be placed today after flushing of both ports with 40 ml Saline by the dialysis nurses. This must be followed after each dialysis session.     With the coming dialysis session I would recommend using 1500 IU Heparin with the priming of the machine and watch for any clots to adjust the intradialytic heparin diose.        PENNY MOLINA.Alexandro. MD. ALISSA. JORDY.  , [employer] [facility] [employer]  Nephrology Consultant. [employer] [facility].   [address],  [facility] [address]. [address]th [place] [address].    email: [email or web address]  Tel: Office: [phone]

## 2024-07-13 NOTE — ASSESSMENT & PLAN NOTE
Echo    Left Ventricle: The left ventricle is moderately dilated. Normal wall thickness. Global hypokinesis present. There is severely reduced systolic function with a visually estimated ejection fraction of 20 - 25%.    Right Ventricle: Mild right ventricular enlargement. Wall thickness is normal. Right ventricle wall motion has global hypokinesis. Systolic function is moderately reduced.    Left Atrium: Left atrium is severely dilated.    Right Atrium: Right atrium is severely dilated.    Aortic Valve: The aortic valve is a trileaflet valve. There is mild aortic valve sclerosis.    Mitral Valve: There is mild regurgitation.    Tricuspid Valve: There is severe regurgitation.    Pulmonary Artery: There is moderate pulmonary hypertension. The estimated pulmonary artery systolic pressure is 61 mmHg.    IVC/SVC: Elevated venous pressure at 15 mmHg.    Pericardium: There is a trivial circumferential effusion. No indication of cardiac tamponade.    - Will require interventional cardiology for LHC vs PET for reduced EF once euvolemic  - LifeVest prior to discharge  - Starting Entresto 24-26mg BID per cardiology recs; discussed with patient regarding allergy to irbesartan and stated does not recall any problems with taking it

## 2024-07-13 NOTE — PROGRESS NOTES
07/12/24 2258   Vital Signs   /69   Pre-Hemodialysis Assessment   Treatment Status Started   Total Chlorine is less than 0.1 ppm Yes   Machine Number [id number]   Alarms Verified Yes   pH 7.2   Machine Temperature 97.7 °F (36.5 °C)   Dialyzer F-180   Machine Conductivity 13.9   Meter Conductivity 14   Dialysate Na (mEq/L) 138   Dialysate K (mEq/L) 2   Dialysate CA (mEq/L) 2.5   Dialysate HCO3 (mEq/L) 35   Net UF Goal 0   Total UF Goal 500   UF Rate 140        Hemodialysis Catheter 07/11/24 1219 left subclavian   Placement Date/Time: 07/11/24 1219   Hand Hygiene: Performed  Barrier Precautions: Performed  Skin Antisepsis: ChloraPrep  Hemodialysis Catheter Type: Tunneled catheter  Location: left subclavian  Catheter Size (Fr): 14 Fr  Pressure Injectable Cathete...   Site Assessment No swelling;No warmth   Line Securement Device Other (comment)   Dressing Type Gauze   Dressing Status Intact   Dressing Intervention Integrity maintained   Venous Patency/Care accessed;flushed w/o difficulty   Arterial Patency/Care accessed;flushed w/o difficulty   During Hemodialysis Assessment   Blood Flow Rate (mL/min) 400 mL/min   Dialysate Flow Rate (mL/min) 800 ml/min   Ultrafiltration Rate (mL/Hr) 140 mL/Hr   Arteriovenous Lines Secure Yes   Arterial Pressure (mmHg) 200 mmHg   Venous Pressure (mmHg) 140   TMP 10   Venous Line in Air Detector Yes   Transducer Dry Yes   Access Visible Yes   Intra-Hemodialysis Comments hd initiated, vss     Received report from primary rn, hd initiated, accessed, art sluggish, lines secured

## 2024-07-13 NOTE — SUBJECTIVE & OBJECTIVE
Interval History/Significant Events: Tolerated HD overnight without immediate complications. More awake and alert this morning without complaints.     Review of Systems   Constitutional:  Positive for activity change and fatigue. Negative for fever.   HENT:  Negative for sore throat.    Respiratory:  Negative for cough, chest tightness and shortness of breath.    Cardiovascular:  Negative for chest pain and palpitations.   Gastrointestinal:  Negative for abdominal pain, constipation, diarrhea, nausea and vomiting.   Neurological:  Positive for weakness. Negative for dizziness and headaches.   Psychiatric/Behavioral:  Negative for agitation.      Objective:     Vital Signs (Most Recent):  Temp: 97.2 °F (36.2 °C) (07/13/24 0701)  Pulse: (!) 116 (07/13/24 1000)  Resp: (!) 24 (07/13/24 1000)  BP: 118/73 (07/13/24 1000)  SpO2: (!) 90 % (07/13/24 1000) Vital Signs (24h Range):  Temp:  [97.1 °F (36.2 °C)-98.1 °F (36.7 °C)] 97.2 °F (36.2 °C)  Pulse:  [] 116  Resp:  [16-24] 24  SpO2:  [90 %-100 %] 90 %  BP: ()/(46-94) 118/73   Weight: 128.5 kg (283 lb 4.7 oz)  Body mass index is 44.37 kg/m².      Intake/Output Summary (Last 24 hours) at 7/13/2024 1142  Last data filed at 7/13/2024 1000  Gross per 24 hour   Intake 248.92 ml   Output 343 ml   Net -94.08 ml          Physical Exam  Vitals and nursing note reviewed.   Constitutional:       General: She is awake.      Appearance: She is obese. She is ill-appearing.   HENT:      Mouth/Throat:      Mouth: Mucous membranes are moist.      Pharynx: Oropharynx is clear.   Eyes:      Extraocular Movements: Extraocular movements intact.      Pupils: Pupils are equal, round, and reactive to light.   Cardiovascular:      Rate and Rhythm: Tachycardia present. Rhythm irregular.      Comments: Afib  Pulmonary:      Effort: Pulmonary effort is normal.      Breath sounds: Normal breath sounds.   Chest:      Comments: Tunneled HD cath  Abdominal:      General: Bowel sounds are  normal.      Palpations: Abdomen is soft.      Comments: PD catheter   Musculoskeletal:         General: Swelling present. Normal range of motion.      Right lower leg: Edema present.      Left lower leg: Edema present.   Skin:     General: Skin is warm and dry.      Capillary Refill: Capillary refill takes less than 2 seconds.   Neurological:      General: No focal deficit present.      Mental Status: She is alert and oriented to person, place, and time. Mental status is at baseline.      GCS: GCS eye subscore is 4. GCS verbal subscore is 5. GCS motor subscore is 6.   Psychiatric:         Mood and Affect: Mood normal.         Behavior: Behavior normal. Behavior is cooperative.         Thought Content: Thought content normal.         Judgment: Judgment normal.          Vents:  Oxygen Concentration (%): 28 (07/05/24 2320)  Lines/Drains/Airways       Central Venous Catheter Line  Duration                  Hemodialysis Catheter 07/11/24 1219 left subclavian 1 day              Peripheral Intravenous Line  Duration                  Midline Catheter - Single Lumen 07/12/24 0430 Right basilic vein (medial side of arm) 1 day         Peripheral IV - Single Lumen 07/12/24 1700 20 G 1 3/4 in Anterior;Distal;Right Forearm <1 day                  Significant Labs:    CBC/Anemia Profile:  Recent Labs   Lab 07/12/24  0403 07/13/24  0342   WBC 11.08 12.36   HGB 10.1* 10.3*   HCT 31.4* 32.9*    125*   MCV 82 83   RDW 21.2* 21.5*        Chemistries:  Recent Labs   Lab 07/12/24  0403 07/12/24  1233 07/13/24  0342   * 131* 132*   K 4.4 4.8 4.0   CL 99 100 99   CO2 19* 17* 22*   BUN 41* 43* 33*   CREATININE 4.7* 5.1* 4.3*   CALCIUM 8.0* 8.3* 8.0*   ALBUMIN 2.2* 2.2* 2.2*   PROT 5.1*  --  5.2*   BILITOT 1.1*  --  1.1*   ALKPHOS 152*  --  148*   ALT 59*  --  55*   AST 53*  --  47*   MG 2.3 2.3 2.2   PHOS 5.0* 5.2* 4.6*       All pertinent labs within the past 24 hours have been reviewed.    Significant Imaging:  I have  reviewed all pertinent imaging results/findings within the past 24 hours.

## 2024-07-13 NOTE — PROGRESS NOTES
[facility] - Card[facility] Medical [facility]  Critical Care Medicine  Progress Note    Patient Name: Juhi Taylor  MRN: [id number]  Admission Date: 7/3/2024  Hospital Length of Stay: 10 days  Code Status: Full Code  Attending Provider: Kristin Alvarado MD  Primary Care Provider: Tony Sadler MD   Principal Problem: ESRD (end stage renal disease)    Subjective:     HPI:  Juhi Taylor is a 74-year-old female with a past medical history of CKD stage 5 with recent PD catheter placement on 6/4, RCC s/p right nephrectomy, T2DM, obesity, CAD s/p PCI to Lcx and LAD in 11/2020 and HFmREF who is admitted as a transfer from [facility] for higher level of care and further cardiac evaluation. Nephrology consulted for hemodialysis. She initially presented on 6/18/24 to [facility] for hypervolemia in the setting of CKD 4-5 for which she had undergone elective PD catheter placement complicated by catheter site leaking. She had a complex hospital course, was initially placed on furosemide gtt without significant improvement and later underwent placement of tunneled line for HD with volume removal. During her second dialysis session she went into PEA arrest for which she was resuscitated, intubated and transferred to ICU. She was noted to have elevated troponin and subsequent TTE found reduced EF from 40-45% to 20-25% for which coronary angiography was recommended. Her hospital course was also complicated by dialysis line infection, significant bleeding around the catheter site, bradycardia and subsequent atrial fibrillation with RVR. Given the catheter site bleeding, anticoagulation was temporarily held. She has reported allergy to amiodarone, was initially placed on diltiazem for atrial fibrillation despite reduced EF. She discussed GOC at outside hospital where it appears that she lacked capacity per note and decided to pursue [place] [facility] At that time, gen surgery was planning to place a left tunneled catheter due to  concerns of the right side not being suitable     Upon admit, patient appeared drowsy but conversant. Daughter reports that she typically gets lethargic 2/2 to the volume overload due to not receiving dialysis. Patient reported b/l left lower extremity pain. Daughter concerned about her not having dialysis in 4 days and wants a session today.     Pt initially refusing dialysis. Seen by nephrology. VBG 7.220/33.4/34/13.7/-14. Critical care medicine consulted for emergent trialysis line placement and dialysis.     Hospital/ICU Course:  Ms. Taylor was admitted to MICU on 7/8 for urgent dialysis line placement and initiated of HD. Patient received short run of CRRT overnight without issue. Encephalopathy improving with continued CRRT treatments. Persistent AFib with RVR, received Digoxin loading dose (renally dosed), restarted PO Carvedilol. Patient remains in persistent AFib RVR despite therapeutic digoxin level and uptitration of Carvedilol levels, started on Amiodarone infusion overnight. Transitioned to amio PO, started on heparin gtt for afib AC. Tolerated HD well overnight without complications and less encephalopathic upon exam. Planning for gen surg to remove PD catheter. Stable at this time for stepdown with hospital medicine.    Interval History/Significant Events: Tolerated HD overnight without immediate complications. More awake and alert this morning without complaints.     Review of Systems   Constitutional:  Positive for activity change and fatigue. Negative for fever.   HENT:  Negative for sore throat.    Respiratory:  Negative for cough, chest tightness and shortness of breath.    Cardiovascular:  Negative for chest pain and palpitations.   Gastrointestinal:  Negative for abdominal pain, constipation, diarrhea, nausea and vomiting.   Neurological:  Positive for weakness. Negative for dizziness and headaches.   Psychiatric/Behavioral:  Negative for agitation.      Objective:     Vital Signs (Most  Recent):  Temp: 97.2 °F (36.2 °C) (07/13/24 0701)  Pulse: (!) 116 (07/13/24 1000)  Resp: (!) 24 (07/13/24 1000)  BP: 118/73 (07/13/24 1000)  SpO2: (!) 90 % (07/13/24 1000) Vital Signs (24h Range):  Temp:  [97.1 °F (36.2 °C)-98.1 °F (36.7 °C)] 97.2 °F (36.2 °C)  Pulse:  [] 116  Resp:  [16-24] 24  SpO2:  [90 %-100 %] 90 %  BP: ()/(46-94) 118/73   Weight: 128.5 kg (283 lb 4.7 oz)  Body mass index is 44.37 kg/m².      Intake/Output Summary (Last 24 hours) at 7/13/2024 1142  Last data filed at 7/13/2024 1000  Gross per 24 hour   Intake 248.92 ml   Output 343 ml   Net -94.08 ml          Physical Exam  Vitals and nursing note reviewed.   Constitutional:       General: She is awake.      Appearance: She is obese. She is ill-appearing.   HENT:      Mouth/Throat:      Mouth: Mucous membranes are moist.      Pharynx: Oropharynx is clear.   Eyes:      Extraocular Movements: Extraocular movements intact.      Pupils: Pupils are equal, round, and reactive to light.   Cardiovascular:      Rate and Rhythm: Tachycardia present. Rhythm irregular.      Comments: Afib  Pulmonary:      Effort: Pulmonary effort is normal.      Breath sounds: Normal breath sounds.   Chest:      Comments: Tunneled HD cath  Abdominal:      General: Bowel sounds are normal.      Palpations: Abdomen is soft.      Comments: PD catheter   Musculoskeletal:         General: Swelling present. Normal range of motion.      Right lower leg: Edema present.      Left lower leg: Edema present.   Skin:     General: Skin is warm and dry.      Capillary Refill: Capillary refill takes less than 2 seconds.   Neurological:      General: No focal deficit present.      Mental Status: She is alert and oriented to person, place, and time. Mental status is at baseline.      GCS: GCS eye subscore is 4. GCS verbal subscore is 5. GCS motor subscore is 6.   Psychiatric:         Mood and Affect: Mood normal.         Behavior: Behavior normal. Behavior is cooperative.          Thought Content: Thought content normal.         Judgment: Judgment normal.          Vents:  Oxygen Concentration (%): 28 (07/05/24 2320)  Lines/Drains/Airways       Central Venous Catheter Line  Duration                  Hemodialysis Catheter 07/11/24 1219 left subclavian 1 day              Peripheral Intravenous Line  Duration                  Midline Catheter - Single Lumen 07/12/24 0430 Right basilic vein (medial side of arm) 1 day         Peripheral IV - Single Lumen 07/12/24 1700 20 G 1 3/4 in Anterior;Distal;Right Forearm <1 day                  Significant Labs:    CBC/Anemia Profile:  Recent Labs   Lab 07/12/24  0403 07/13/24  0342   WBC 11.08 12.36   HGB 10.1* 10.3*   HCT 31.4* 32.9*    125*   MCV 82 83   RDW 21.2* 21.5*        Chemistries:  Recent Labs   Lab 07/12/24  0403 07/12/24  1233 07/13/24  0342   * 131* 132*   K 4.4 4.8 4.0   CL 99 100 99   CO2 19* 17* 22*   BUN 41* 43* 33*   CREATININE 4.7* 5.1* 4.3*   CALCIUM 8.0* 8.3* 8.0*   ALBUMIN 2.2* 2.2* 2.2*   PROT 5.1*  --  5.2*   BILITOT 1.1*  --  1.1*   ALKPHOS 152*  --  148*   ALT 59*  --  55*   AST 53*  --  47*   MG 2.3 2.3 2.2   PHOS 5.0* 5.2* 4.6*       All pertinent labs within the past 24 hours have been reviewed.    Significant Imaging:  I have reviewed all pertinent imaging results/findings within the past 24 hours.    ABG  Recent Labs   Lab 07/09/24  0904   PH 7.281*   PO2 42   PCO2 47.0*   HCO3 22.1*   BE -5*     Assessment/Plan:     Cardiac/Vascular  Atrial fibrillation with RVR  AFib with RVR appears new following cardiac arrest at OSH. Per chart review, AFib was difficult to control. Patient endorses amiodarone reaction with itching, rash, and oral burning. Refused Sotalol, DCCV, and AICD placement at OSH. Was placed on Diltizem infusion and transitioned to PO metoprolol. Amio gtt restarted overnight 7/11 s/t uncontrolled afib rvr. Transitioned to PO amio.    - Heparin gtt initiated for AC  - Daily CMP  - Keep K>4, Mag  >2    Acute on chronic combined systolic and diastolic heart failure  Echo    Left Ventricle: The left ventricle is moderately dilated. Normal wall thickness. Global hypokinesis present. There is severely reduced systolic function with a visually estimated ejection fraction of 20 - 25%.    Right Ventricle: Mild right ventricular enlargement. Wall thickness is normal. Right ventricle wall motion has global hypokinesis. Systolic function is moderately reduced.    Left Atrium: Left atrium is severely dilated.    Right Atrium: Right atrium is severely dilated.    Aortic Valve: The aortic valve is a trileaflet valve. There is mild aortic valve sclerosis.    Mitral Valve: There is mild regurgitation.    Tricuspid Valve: There is severe regurgitation.    Pulmonary Artery: There is moderate pulmonary hypertension. The estimated pulmonary artery systolic pressure is 61 mmHg.    IVC/SVC: Elevated venous pressure at 15 mmHg.    Pericardium: There is a trivial circumferential effusion. No indication of cardiac tamponade.    - Will require interventional cardiology for LHC vs PET for reduced EF once euvolemic  - LifeVest prior to discharge  - Starting Entresto 24-26mg BID per cardiology recs; discussed with patient regarding allergy to irbesartan and stated does not recall any problems with taking it    Essential hypertension  - Changed home Carvedilol to Metoprolol 25mg BID per cardiology recs    Renal/  CKD stage 4 secondary to hypertension  Pt previously on PD. Unable to remove enough volume with PD catheter. Initially upgraded to MICU for emergent trialysis placement and CRRT. Now with tunneled dialysis catheter. Tolerated session of HD on 7/12 without complications.     - Nephrology following, appreciate recs  - Strict I/Os  - Renally dose meds  - Avoid nephrotoxic agents  - General surgery consulted to remove PD catheter    Endocrine  Type 2 diabetes mellitus with microalbuminuria, without long-term current use of  insulin  Latest A1C 6.8; on Metformin at home    - POC glucose  - Hypoglycemia protocol    Palliative Care  Palliative care encounter  - Palliative care following, continued GOC with family    ACP (advance care planning)  - Palliative care consulted, appreciate their assistance in clarification of GOC/POC with patient and family       Critical Care Daily Checklist:    A: Awake: RASS Goal/Actual Goal:    Actual:     B: Spontaneous Breathing Trial Performed?     C: SAT & SBT Coordinated?  N/A                      D: Delirium: CAM-ICU Overall CAM-ICU: Negative   E: Early Mobility Performed? Yes   F: Feeding Goal:    Status:     Current Diet Order   Procedures    Diet diabetic Renal, Consistent Carbohydrate, Low Phosphorus, Low Potassium; 2000 Calorie     Order Specific Question:   Additional Diet Options:     Answer:   Renal     Order Specific Question:   Additional Diet Options:     Answer:   Consistent Carbohydrate     Order Specific Question:   Additional Diet Options:     Answer:   Low Phosphorus     Order Specific Question:   Additional Diet Options:     Answer:   Low Potassium     Order Specific Question:   Total calories:     Answer:   2000 Calorie      AS: Analgesia/Sedation Tylenol PRN   T: Thromboembolic Prophylaxis Heparin gtt   H: HOB > 300 Yes   U: Stress Ulcer Prophylaxis (if needed) N/A   G: Glucose Control 140-180   B: Bowel Function Stool Occurrence: 1   I: Indwelling Catheter (Lines & Damon) Necessity Midline, PIV, tunneled HD cath   D: De-escalation of Antimicrobials/Pharmacotherapies N/A    Plan for the day/ETD Stepdown to     Code Status:  Family/Goals of Care: Full Code       I have spent 35 min with this patient, with over 50% of this time spent coordinating care and speaking with the family     Medina Clayton [facility]-BC  Critical Care Medicine  [facility] - Cardiac Medical ICU

## 2024-07-13 NOTE — PLAN OF CARE
MICU DAILY GOALS     Family/Goals of care/Code Status   Code Status: Full Code    24H Vital Sign Range  Temp:  [97.1 °F (36.2 °C)-98.1 °F (36.7 °C)]   Pulse:  []   Resp:  [16-24]   BP: ()/(46-94)   SpO2:  [90 %-100 %]      Shift Events (include procedures and significant events)   No acute events throughout shift. Heparin rate increased to 11 units per MAR. Next PTT at 2045. HD catheter dressing changed. Step down orders placed.    AWAKE RASS: Goal -    Actual - RASS (Jha Agitation-Sedation Scale): drowsy    Restraint necessity: Not necessary   BREATHE SBT: Not intubated    Coordinate A & B, analgesics/sedatives Pain: managed   SAT: Not intubated   Delirium CAM-ICU: Overall CAM-ICU: Negative   Early(intubated/ Progressive (non-intubated) Mobility MOVE Screen (INTUBATED ONLY): Not intubated    Activity: Activity Management: Ankle pumps - L1, Arm raise - L1   Feeding/Nutrition Diet order: Diet/Nutrition Received: consistent carb/diabetic diet, Specialty Diet/Nutrition Received: renal diet   Thrombus DVT prophylaxis: VTE Required Core Measure: Pharmacological prophylaxis initiated/maintained   HOB Elevation Head of Bed (HOB) Positioning: HOB elevated   Ulcer Prophylaxis GI: no   Glucose control managed Glycemic Management: blood glucose monitored   Skin Skin assessed during: Daily Assessment    Sacrum intact/not altered? Yes  Heels intact/not altered? Yes  Surgical wound? No    CHECK ONE!   (no altered skin or altered skin) and sub boxes:  [] No Altered Skin Integrity Present    []Prevention Measures Documented    [x] Altered Skin Integrity Present or Discovered   [x] LDA present in EPIC, daily doc completed              [] LDA added if not in EPIC (describe wound).                    When describing wound, do not stage, use descriptive words only.    [] Wound Image Taken (required on admit,                   transfer/discharge and every Tuesday)    Wound Care Consulted? No    4 EYES:  Attending Nurse  (1st set of eyes):     Second RN/Staff Member (2nd set of eyes):    Bowel Function no issues    Indwelling Catheter Necessity      [REMOVED] Trialysis (Dialysis) Catheter 07/08/24 2210 left internal jugular-Line Necessity Review: CRRT/HD       Hemodialysis Catheter 07/11/24 1219 left subclavian-Line Necessity Review: CRRT/HD     De-escalation Antibiotics No        VS and assessment per flow sheet, patient progressing towards goals as tolerated, plan of care reviewed with family and patient, all concerns addressed, will continue to monitor.

## 2024-07-13 NOTE — ASSESSMENT & PLAN NOTE
Nephrology consulted for inpatient-HD management  admitted as a transfer for acute HFrEF in setting of CKD5 and recent PEA arrest during dialysis. Recent PD catheter placement on 6/4 but concerned for a possibly leak from cath. Unable to initiate dialysis for 2 weeks PTA to OSH due to insurance issues. Hospital course complicated by infected tunneled cath removed on 6/30. Transferred here for higher level of care. Initial plan at OSH was to replace tunneled catheter on the left side due to concerns to that right side may not by suitable. PD was initiated here in hospital however we were unable to remove significant amounts of fluid, At this time the daughter wishes to proceed with HD.       07/13  Tunnel cath placed 7/11  HD done last night, clotting issue with HD, Cath flow,TPA, was placed for 4 hours in both ports. The patient had a dialysis session afterwards with good blood flow of 400ml/min.       Plan:    Plan for hd on Monday   Per interventional nephrology recommendation -will need 5000IU/ml Heparin lock in each port with each dialysis session.   - use 1500 IU Heparin with the priming of the machine   - Renally adjust medications  - Pre and Post-HD weights   - Continue to monitor intake and output      Anemia of ESRD   Recent Labs   Lab 07/11/24  0357 07/12/24  0403 07/13/24  0342   WBC 14.49* 11.08 12.36   HGB 10.8* 10.1* 10.3*   HCT 33.2* 31.4* 32.9*    150 125*     Lab Results   Component Value Date    FESATURATED 10 (L) 01/22/2024    FERRITIN 141 01/22/2024       - Goal in ESRD is Hgb of 10-11.     Mineral Bone Disease in ESRD   Lab Results   Component Value Date    .7 (H) 05/08/2024    CALCIUM 8.0 (L) 07/13/2024    ALBUMIN 2.2 (L) 07/13/2024    CAION 1.36 04/14/2021    PHOS 4.6 (H) 07/13/2024

## 2024-07-13 NOTE — PROVIDER TRANSFER
ICU Transfer of Care Note  Critical Care Medicine    Admit Date: 7/3/2024  LOS: 10    CC: ESRD (end stage renal disease)    Code Status: Full Code         HPI and Hospital Course:     HPI:  Juhi Taylor is a 74-year-old female with a past medical history of CKD stage 5 with recent PD catheter placement on 6/4, RCC s/p right nephrectomy, T2DM, obesity, CAD s/p PCI to Lcx and LAD in 11/2020 and HFmREF who is admitted as a transfer from [facility] for higher level of care and further cardiac evaluation. Nephrology consulted for hemodialysis. She initially presented on 6/18/24 to [facility] for hypervolemia in the setting of CKD 4-5 for which she had undergone elective PD catheter placement complicated by catheter site leaking. She had a complex hospital course, was initially placed on furosemide gtt without significant improvement and later underwent placement of tunneled line for HD with volume removal. During her second dialysis session she went into PEA arrest for which she was resuscitated, intubated and transferred to ICU. She was noted to have elevated troponin and subsequent TTE found reduced EF from 40-45% to 20-25% for which coronary angiography was recommended. Her hospital course was also complicated by dialysis line infection, significant bleeding around the catheter site, bradycardia and subsequent atrial fibrillation with RVR. Given the catheter site bleeding, anticoagulation was temporarily held. She has reported allergy to amiodarone, was initially placed on diltiazem for atrial fibrillation despite reduced EF. She discussed GOC at outside hospital where it appears that she lacked capacity per note and decided to pursue higher level of care at [facility]. At that time, gen surgery was planning to place a left tunneled catheter due to concerns of the right side not being suitable     Upon admit, patient appeared drowsy but conversant. Daughter reports that she typically gets lethargic 2/2 to the volume overload due to  not receiving dialysis. Patient reported b/l left lower extremity pain. Daughter concerned about her not having dialysis in 4 days and wants a session today.     Pt initially refusing dialysis. Seen by nephrology. VBG 7.220/33.4/34/13.7/-14. Critical care medicine consulted for emergent trialysis line placement and dialysis.     Hospital/ICU Course:  Ms. Taylor was admitted to MICU on 7/8 for urgent dialysis line placement and initiated of HD. Patient received short run of CRRT overnight without issue. Encephalopathy improving with continued CRRT treatments. Persistent AFib with RVR, received Digoxin loading dose (renally dosed), restarted PO Carvedilol. Patient remains in persistent AFib RVR despite therapeutic digoxin level and uptitration of Carvedilol levels, started on Amiodarone infusion overnight. Transitioned to amio PO, started on heparin gtt for afib AC. Tolerated HD well overnight without complications and less encephalopathic upon exam. Planning for gen surg to remove PD catheter. Stable at this time for stepdown with hospital medicine.      To Follow Up:     General surgery consulted for PD catheter removal,  Conversion of heparin gtt to PO AC post removal of PD catheter,  Re-consult cardiology/interventional cards for LHC vs. PET for reduced EF once euvolemic,  Received first HD session on 7/12 - plan for HD again on Monday 7/15 per nephrology,  Continued GOC; appreciate palliative assistance.    Discharge Plan:     Skilled nursing facility vs. Home health upon discharge    Please call with questions.     JEWELL Cedeno-BC  Pulmonary Critical Care  07/13/2024

## 2024-07-13 NOTE — PLAN OF CARE
MICU DAILY GOALS     Family/Goals of care/Code Status   Code Status: Full Code    24H Vital Sign Range  Temp:  [97.1 °F (36.2 °C)-98.1 °F (36.7 °C)]   Pulse:  []   Resp:  [16-23]   BP: ()/(46-94)   SpO2:  [90 %-100 %]      Shift Events (include procedures and significant events)   HD completed. Heparin gtt infusing.     AWAKE RASS: Goal -    Actual - RASS (Jha Agitation-Sedation Scale): drowsy    Restraint necessity: Not necessary   BREATHE SBT: Not intubated    Coordinate A & B, analgesics/sedatives Pain: managed   SAT: Not intubated   Delirium CAM-ICU: Overall CAM-ICU: Negative   Early(intubated/ Progressive (non-intubated) Mobility MOVE Screen (INTUBATED ONLY): Not intubated    Activity: Activity Management: Rolling - L1   Feeding/Nutrition Diet order: Diet/Nutrition Received: consistent carb/diabetic diet, Specialty Diet/Nutrition Received: renal diet   Thrombus DVT prophylaxis: VTE Required Core Measure: Pharmacological prophylaxis initiated/maintained   HOB Elevation Head of Bed (HOB) Positioning: HOB at 30-45 degrees   Ulcer Prophylaxis GI: yes   Glucose control managed Glycemic Management: blood glucose monitored   Skin Skin assessed during: Q Shift Change    Sacrum intact/not altered? Yes  Heels intact/not altered? Yes  Surgical wound? Yes    CHECK ONE!   (no altered skin or altered skin) and sub boxes:  [x] No Altered Skin Integrity Present    []Prevention Measures Documented    [] Altered Skin Integrity Present or Discovered   [] LDA present in EPIC, daily doc completed              [] LDA added if not in EPIC (describe wound).                    When describing wound, do not stage, use descriptive words only.    [] Wound Image Taken (required on admit,                   transfer/discharge and every Tuesday)    Wound Care Consulted? No    4 EYES:  Attending Nurse (1st set of eyes): NEENA Flor     Second RN/Staff Member (2nd set of eyes): NEENA Matthew    Bowel Function no issues    Indwelling  Catheter Necessity      [REMOVED] Trialysis (Dialysis) Catheter 07/08/24 2210 left internal jugular-Line Necessity Review: CRRT/HD       Hemodialysis Catheter 07/11/24 1219 left subclavian-Line Necessity Review: CRRT/HD     De-escalation Antibiotics No        VS and assessment per flow sheet, patient progressing towards goals as tolerated, plan of care reviewed with  pt and sibling  , all concerns addressed.

## 2024-07-13 NOTE — ASSESSMENT & PLAN NOTE
Pt previously on PD. Unable to remove enough volume with PD catheter. Initially upgraded to MICU for emergent trialysis placement and CRRT. Now with tunneled dialysis catheter. Tolerated session of HD on 7/12 without complications.     - Nephrology following, appreciate recs  - Strict I/Os  - Renally dose meds  - Avoid nephrotoxic agents  - General surgery consulted to remove PD catheter

## 2024-07-13 NOTE — SUBJECTIVE & OBJECTIVE
Interval History:   Last HD done last night, clotting issue with HD, Cath flow,TPA, was placed for 4 hours in both ports. The patient had a dialysis session afterwards with good blood flow of 400ml/min.   Review of patient's allergies indicates:   Allergen Reactions    Percocet [oxycodone-acetaminophen] Itching    Amiodarone analogues      Itching      Irbesartan Swelling    Januvia [sitagliptin]     Jardiance [empagliflozin]      Leg cramps    Lipitor [atorvastatin] Other (See Comments)     Severe leg pain    Linaclotide Other (See Comments) and Nausea And Vomiting     Does not remember    Lubiprostone Other (See Comments) and Palpitations     Does not remember     Current Facility-Administered Medications   Medication Frequency    acetaminophen tablet 650 mg Q6H PRN    albuterol sulfate nebulizer solution 2.5 mg Q4H PRN    amiodarone tablet 400 mg BID    Followed by    [START ON 7/22/2024] amiodarone tablet 200 mg Daily    aspirin EC tablet 81 mg Daily    dextromethorphan-guaiFENesin  mg/5 ml liquid 5 mL Q4H PRN    dextrose 10% bolus 125 mL 125 mL PRN    dextrose 10% bolus 250 mL 250 mL PRN    gentamicin 0.1 % ointment TID    glucagon (human recombinant) injection 1 mg PRN    glucose chewable tablet 16 g PRN    glucose chewable tablet 24 g PRN    heparin 25,000 units in dextrose 5% (100 units/ml) IV bolus from bag LOW INTENSITY nomogram - [facility] PRN    heparin 25,000 units in dextrose 5% (100 units/ml) IV bolus from bag LOW INTENSITY nomogram - [facility] PRN    heparin 25,000 units in dextrose 5% 250 mL (100 units/mL) infusion LOW INTENSITY nomogram - [facility] Continuous    metoprolol tartrate (LOPRESSOR) tablet 25 mg BID    naloxone 0.4 mg/mL injection 0.02 mg PRN    ondansetron injection 4 mg Q6H PRN    simethicone chewable tablet 80 mg TID PRN    sodium chloride 0.9% flush 10 mL PRN       Objective:     Vital Signs (Most Recent):  Temp: 97.2 °F (36.2 °C) (07/13/24 0701)  Pulse: (!) 116 (07/13/24 1000)  Resp: (!) 24  (07/13/24 1000)  BP: 118/73 (07/13/24 1000)  SpO2: (!) 90 % (07/13/24 1000) Vital Signs (24h Range):  Temp:  [97.1 °F (36.2 °C)-98.1 °F (36.7 °C)] 97.2 °F (36.2 °C)  Pulse:  [] 116  Resp:  [16-24] 24  SpO2:  [90 %-100 %] 90 %  BP: ()/(46-94) 118/73     Weight: 128.5 kg (283 lb 4.7 oz) (07/08/24 2000)  Body mass index is 44.37 kg/m².  Body surface area is 2.46 meters squared.    I/O last 3 completed shifts:  In: 305 [I.V.:252; IV Piggyback:53]  Out: 343 [Other:343]     Physical Exam  Constitutional:       General: She is not in acute distress.     Appearance: Normal appearance. She is obese.   HENT:      Head: Normocephalic and atraumatic.      Mouth/Throat:      Mouth: Mucous membranes are moist.      Pharynx: Oropharynx is clear.   Cardiovascular:      Rate and Rhythm: Normal rate and regular rhythm.      Heart sounds: Normal heart sounds.   Pulmonary:      Effort: Pulmonary effort is normal.      Breath sounds: Normal breath sounds.   Abdominal:      Palpations: Abdomen is soft.   Musculoskeletal:      Right lower leg: Edema (2+) present.      Left lower leg: Edema (2+) present.   Skin:     General: Skin is warm and dry.   Neurological:      General: No focal deficit present.      Mental Status: She is alert. She is disoriented.      Motor: Weakness present.          Significant Labs:  All labs within the past 24 hours have been reviewed.     Significant Imaging:  All imaging with the past 24 hours have been reviewed.

## 2024-07-13 NOTE — PROGRESS NOTES
07/13/24 0145   During Hemodialysis Assessment   Blood Flow Rate (mL/min) 400 mL/min   Dialysate Flow Rate (mL/min) 800 ml/min   Ultrafiltration Rate (mL/Hr) 150 mL/Hr   Arteriovenous Lines Secure Yes   Arterial Pressure (mmHg) 220 mmHg   Venous Pressure (mmHg) 110   UF Removed (mL) 343 mL   TMP 10   Venous Line in Air Detector Yes   Transducer Dry Yes   Access Visible Yes   Intra-Hemodialysis Comments hd ended   Post-Hemodialysis Assessment   Rinseback Volume (mL) 250 mL   Blood Volume Processed (Liters) 39.5 L   Dialyzer Clearance Clear   Duration of Treatment 2.3 minutes   Total UF (mL) 343 mL   Net Fluid Removal 157   Patient Response to Treatment tolerated     Report given to primary rn, hd ended with less than 30 min d/t venous chamber clotting, deaccessed

## 2024-07-14 ENCOUNTER — ANESTHESIA EVENT (OUTPATIENT)
Dept: SURGERY | Facility: HOSPITAL | Age: 74
End: 2024-07-14
Payer: MEDICARE

## 2024-07-14 PROBLEM — I12.9 CKD STAGE 4 SECONDARY TO HYPERTENSION: Status: RESOLVED | Noted: 2022-04-07 | Resolved: 2024-07-14

## 2024-07-14 PROBLEM — N18.4 CKD STAGE 4 SECONDARY TO HYPERTENSION: Status: RESOLVED | Noted: 2022-04-07 | Resolved: 2024-07-14

## 2024-07-14 NOTE — PROGRESS NOTES
Arrived patient's room for bedside HD TX  Received report from primary nurse.  Vital signs stable.  HD TX started via left subclavian HD catheter,  High alarm on the machine on a blood flow of 150.  Notified Nephrology, Dr. Frank of flow issues.  Rinsed back patient.  Notified primary nurse.  Awaiting for orders.

## 2024-07-14 NOTE — ASSESSMENT & PLAN NOTE
Nephrology consulted for inpatient-HD management  admitted as a transfer for acute HFrEF in setting of CKD5 and recent PEA arrest during dialysis. Recent PD catheter placement on 6/4 but concerned for a possibly leak from cath. Unable to initiate dialysis for 2 weeks PTA to OSH due to insurance issues. Hospital course complicated by infected tunneled cath removed on 6/30. Transferred here for higher level of care. Initial plan at OSH was to replace tunneled catheter on the left side due to concerns to that right side may not by suitable. PD was initiated here in hospital however we were unable to remove significant amounts of fluid, At this time the daughter wishes to proceed with HD.       07/13  Tunnel cath placed 7/11  HD done last night, clotting issue with HD, Cath flow,TPA, was placed for 4 hours in both ports. The patient had a dialysis session afterwards with good blood flow of 400ml/min.       Plan:    On exam looks edematous, plan for HD today UF 3 L if able to tolerate it  Per interventional nephrology recommendation -will need 5000IU/ml Heparin lock in each port with each dialysis session.   - use 1500 IU Heparin with the priming of the machine   - Renally adjust medications  - Pre and Post-HD weights   - Continue to monitor intake and output      Anemia of ESRD   Recent Labs   Lab 07/12/24  0403 07/13/24  0342 07/14/24  0512   WBC 11.08 12.36 17.77*   HGB 10.1* 10.3* 9.9*   HCT 31.4* 32.9* 32.3*    125* 145*       Lab Results   Component Value Date    FESATURATED 10 (L) 01/22/2024    FERRITIN 141 01/22/2024       - Goal in ESRD is Hgb of 10-11.     Mineral Bone Disease in ESRD   Lab Results   Component Value Date    .7 (H) 05/08/2024    CALCIUM 8.2 (L) 07/14/2024    ALBUMIN 1.9 (L) 07/14/2024    CAION 1.36 04/14/2021    PHOS 5.6 (H) 07/14/2024

## 2024-07-14 NOTE — NURSING
aPTT therapeutic at 43.9. This is the 2nd therapeutic result in a row so next aPTT draw is in the morning. Heparin gtt maintaining at 13u

## 2024-07-14 NOTE — NURSING
1536: Tegaderm applied over gauze. Pressure held for 1 minute. No bleeding on gauze or tegerderm noted.     1531: dressing removed from neck and mastol applied to bleeding neck area. Gauze placed on top and pressure held for 5 minutes

## 2024-07-14 NOTE — SUBJECTIVE & OBJECTIVE
Interval History/Significant Events: No acute events overnight.     Review of Systems   Constitutional:  Positive for activity change and fatigue.   Respiratory:  Negative for cough and shortness of breath.    Cardiovascular:  Positive for leg swelling. Negative for chest pain.   Gastrointestinal:  Positive for abdominal pain. Negative for nausea and vomiting.   Musculoskeletal:  Negative for arthralgias and myalgias.   Neurological:  Negative for dizziness, light-headedness and headaches.   Psychiatric/Behavioral:  Negative for agitation and confusion.      Objective:     Vital Signs (Most Recent):  Temp: 96.8 °F (36 °C) (07/14/24 1116)  Pulse: 91 (07/14/24 1116)  Resp: (!) 23 (07/14/24 1116)  BP: 125/81 (07/14/24 1116)  SpO2: (!) 93 % (07/14/24 1116) Vital Signs (24h Range):  Temp:  [96.8 °F (36 °C)-99 °F (37.2 °C)] 96.8 °F (36 °C)  Pulse:  [] 91  Resp:  [18-23] 23  SpO2:  [93 %-99 %] 93 %  BP: ()/(56-81) 125/81   Weight: 128.5 kg (283 lb 4.7 oz)  Body mass index is 44.37 kg/m².      Intake/Output Summary (Last 24 hours) at 7/14/2024 1215  Last data filed at 7/14/2024 1200  Gross per 24 hour   Intake 346.15 ml   Output --   Net 346.15 ml          Physical Exam  Vitals and nursing note reviewed.   Constitutional:       General: She is awake.      Appearance: She is ill-appearing.   HENT:      Mouth/Throat:      Mouth: Mucous membranes are moist.      Pharynx: Oropharynx is clear.   Eyes:      Pupils: Pupils are equal, round, and reactive to light.   Cardiovascular:      Rate and Rhythm: Tachycardia present. Rhythm irregular.      Comments: afib  Pulmonary:      Effort: Pulmonary effort is normal.      Breath sounds: Normal breath sounds.   Chest:      Comments: Tunneled HD cath  Abdominal:      General: There is distension.      Palpations: Abdomen is soft.      Comments: PD cath   Musculoskeletal:         General: Swelling present. Normal range of motion.      Right lower leg: Edema present.      Left  lower leg: Edema present.   Skin:     General: Skin is warm and dry.      Capillary Refill: Capillary refill takes less than 2 seconds.   Neurological:      General: No focal deficit present.      Mental Status: She is alert and oriented to person, place, and time. Mental status is at baseline.      GCS: GCS eye subscore is 4. GCS verbal subscore is 5. GCS motor subscore is 6.   Psychiatric:         Attention and Perception: Attention normal.         Mood and Affect: Mood normal.         Speech: Speech normal.         Behavior: Behavior normal. Behavior is cooperative.         Thought Content: Thought content normal.         Judgment: Judgment normal.            Vents:  Oxygen Concentration (%): 28 (07/05/24 2320)  Lines/Drains/Airways       Central Venous Catheter Line  Duration                  Hemodialysis Catheter 07/11/24 1219 left subclavian 2 days              Peripheral Intravenous Line  Duration                  Midline Catheter - Single Lumen 07/12/24 0430 Right basilic vein (medial side of arm) 2 days         Peripheral IV - Single Lumen 07/12/24 1700 20 G 1 3/4 in Anterior;Distal;Right Forearm 1 day                  Significant Labs:    CBC/Anemia Profile:  Recent Labs   Lab 07/13/24  0342 07/14/24  0512   WBC 12.36 17.77*   HGB 10.3* 9.9*   HCT 32.9* 32.3*   * 145*   MCV 83 85   RDW 21.5* 21.7*        Chemistries:  Recent Labs   Lab 07/12/24  1233 07/13/24  0342 07/14/24  0512   * 132* 132*   K 4.8 4.0 4.3    99 100   CO2 17* 22* 22*   BUN 43* 33* 45*   CREATININE 5.1* 4.3* 5.7*   CALCIUM 8.3* 8.0* 8.2*   ALBUMIN 2.2* 2.2* 1.9*   PROT  --  5.2* 4.9*   BILITOT  --  1.1* 0.9   ALKPHOS  --  148* 156*   ALT  --  55* 43   AST  --  47* 30   MG 2.3 2.2 2.2   PHOS 5.2* 4.6* 5.6*       All pertinent labs within the past 24 hours have been reviewed.    Significant Imaging:  I have reviewed all pertinent imaging results/findings within the past 24 hours.

## 2024-07-14 NOTE — PLAN OF CARE
Problem: Physical Therapy  Goal: Physical Therapy Goal  Description: PT goals to be met by: 8/13/24    Patient will perform rolling each way with supervision.   Patient will perform supine <> sitting with supervision.  Patient will perform sit <> stand transitions with supervision using RW.  Patient will perform transfers from bed <> chair or BSC with supervision using RW.  Patient will ambulate 50 feet with stand by assistance using RW.  Outcome: Progressing

## 2024-07-14 NOTE — PLAN OF CARE
MICU DAILY GOALS     Family/Goals of care/Code Status   Code Status: Full Code    24H Vital Sign Range  Temp:  [97.1 °F (36.2 °C)-99 °F (37.2 °C)]   Pulse:  []   Resp:  [16-24]   BP: ()/(56-82)   SpO2:  [90 %-99 %]      Shift Events (include procedures and significant events)   No acute events throughout shift. Heparin gtt infusing per nomogram.     AWAKE RASS: Goal -    Actual - RASS (Jha Agitation-Sedation Scale): drowsy    Restraint necessity: Not necessary   BREATHE SBT: Not intubated    Coordinate A & B, analgesics/sedatives Pain: managed   SAT: Not intubated   Delirium CAM-ICU: Overall CAM-ICU: Negative   Early(intubated/ Progressive (non-intubated) Mobility MOVE Screen (INTUBATED ONLY): Not intubated    Activity: Activity Management: Arm raise - L1   Feeding/Nutrition Diet order: Diet/Nutrition Received: consistent carb/diabetic diet, Specialty Diet/Nutrition Received: renal diet   Thrombus DVT prophylaxis: VTE Required Core Measure: Pharmacological prophylaxis initiated/maintained   HOB Elevation Head of Bed (HOB) Positioning: HOB at 30-45 degrees   Ulcer Prophylaxis GI: yes   Glucose control managed Glycemic Management: blood glucose monitored   Skin Skin assessed during: Daily Assessment    Sacrum intact/not altered? Yes  Heels intact/not altered? Yes  Surgical wound? Yes    CHECK ONE!   (no altered skin or altered skin) and sub boxes:  [] No Altered Skin Integrity Present    []Prevention Measures Documented    [] Altered Skin Integrity Present or Discovered   [] LDA present in EPIC, daily doc completed              [] LDA added if not in EPIC (describe wound).                    When describing wound, do not stage, use descriptive words only.    [] Wound Image Taken (required on admit,                   transfer/discharge and every Tuesday)    Wound Care Consulted? No    4 EYES:  Attending Nurse (1st set of eyes): NEENA Flor     Second RN/Staff Member (2nd set of eyes): NEENA Hawkins    Bowel  Function no issues    Indwelling Catheter Necessity      [REMOVED] Trialysis (Dialysis) Catheter 07/08/24 2210 left internal jugular-Line Necessity Review: CRRT/HD       Hemodialysis Catheter 07/11/24 1219 left subclavian-Line Necessity Review: CRRT/HD     De-escalation Antibiotics No        VS and assessment per flow sheet, patient progressing towards goals as tolerated, plan of care reviewed with  pt and daughter  , all concerns addressed.

## 2024-07-14 NOTE — CONSULTS
[facility] - Cardiac Medical ICU  General Surgery  Consult Note    Patient Name: Juhi Taylor  MRN: [id number]  Code Status: Full Code  Admission Date: 7/3/2024  Hospital Length of Stay: 11 days  Attending Physician: Kristin Alvarado MD  Primary Care Provider: Tony Sadler MD    Patient information was obtained from patient, past medical records, and ER records.     Inpatient consult to General Surgery  Consult performed by: Krista Hollins MD  Consult ordered by: Medina Clayton, AGACNP-BC        Subjective:     Principal Problem: ESRD (end stage renal disease)    History of Present Illness: Juhi Taylor 74-year-old female with morbid obesity, CKD stage 5 s/p PD catheter placement 6/4, RCC s/p right nephrectomy, T2DM, CAD s/p PCI to Lcx and LAD in 11/2020 and HFmREF who is admitted as a transfer from [facility] for higher level of care and further cardiac evaluation. She initially presented on 6/18/24 to [facility] for hypervolemia in the setting of CKD 4-5 for which she had undergone elective PD catheter placement complicated by catheter site leaking. She had a complex hospital course and required placement of tunneled line for HD with volume removal. During her second dialysis session she went into PEA arrest for which she was resuscitated, intubated and transferred to ICU. She was noted to have elevated troponin and subsequent TTE found reduced EF from 40-45% to 20-25% for which coronary angiography was recommended. Her hospital course was also complicated by dialysis line infection, significant bleeding around the catheter site, bradycardia and subsequent atrial fibrillation with RVR. [place] [facility] [place] and admitted to MICU on 7/8 for urgent dialysis line placement and initiated on HD. She has since stabilized and is approaching step down status for the floor. She did not tolerate PD dialysis following placement.     General surgery now consulted for PD  catheter removal. She is alert and oriented and stable for step down.     No current facility-administered medications on file prior to encounter.     Current Outpatient Medications on File Prior to Encounter   Medication Sig    allopurinoL (ZYLOPRIM) 300 MG tablet Take 1 tablet (300 mg total) by mouth once daily.    calcitRIOL (ROCALTROL) 0.5 MCG Cap Take 0.5 mcg by mouth once daily.    carvediloL (COREG) 25 MG tablet Take 1 tablet (25 mg total) by mouth 2 (two) times daily.    cloNIDine 0.1 mg/24 hr td ptwk (CATAPRES) 0.1 mg/24 hr Place 1 patch onto the skin every 7 days.    ELIQUIS 5 mg Tab TAKE 1 TABLET(5 MG) BY MOUTH TWICE DAILY    evolocumab (REPATHA SURECLICK) 140 mg/mL PnIj Inject 1 mL (140 mg total) into the skin every 14 (fourteen) days.    furosemide (LASIX) 40 MG tablet Take 40 mg by mouth once daily.    amLODIPine (NORVASC) 10 MG tablet Take 10 mg by mouth once daily.    blood sugar diagnostic (TRUE METRIX GLUCOSE TEST STRIP) Strp Use 1x daily. Insurance preferred.    blood sugar diagnostic Strp To check BG 1 time daily, to use with insurance preferred meter    cloNIDine (CATAPRES) 0.1 MG tablet Take 1 tablet (0.1 mg total) by mouth 3 (three) times daily as needed (PRN SBP > 165 mmHg).    cyanocobalamin (VITAMIN B-12) 100 MCG tablet Take 100 mcg by mouth once daily.    lancets Misc To check BG 1 times daily, to use with insurance preferred meter    loratadine (CLARITIN) 10 mg tablet Take 10 mg by mouth once daily.    [DISCONTINUED] aspirin (ECOTRIN) 81 MG EC tablet Take 1 tablet (81 mg total) by mouth once daily.    [DISCONTINUED] DULoxetine (CYMBALTA) 20 MG capsule TAKE ONE CAPSULE BY MOUTH ONCE DAILY    [DISCONTINUED] folic acid (FOLVITE) 1 MG tablet Take 1 mg by mouth once daily.    [DISCONTINUED] metFORMIN (GLUCOPHAGE-XR) 500 MG ER 24hr tablet Take 2 tablets (1,000 mg total) by mouth 2 (two) times daily with meals.    [DISCONTINUED] sodium bicarbonate 650 MG tablet Take 1 tablet (650 mg total) by  mouth once daily.       Review of patient's allergies indicates:   Allergen Reactions    Percocet [oxycodone-acetaminophen] Itching    Amiodarone analogues      Itching      Irbesartan Swelling    Januvia [sitagliptin]     Jardiance [empagliflozin]      Leg cramps    Lipitor [atorvastatin] Other (See Comments)     Severe leg pain    Linaclotide Other (See Comments) and Nausea And Vomiting     Does not remember    Lubiprostone Other (See Comments) and Palpitations     Does not remember       Past Medical History:   Diagnosis Date    Anticoagulant long-term use     Arthritis     Breast cancer 2014    invasive lobular carcinoma    Cancer of kidney 11/2020    RIGHT KIDNEY CANCER    CHF (congestive heart failure)     Coronary artery disease dx 2005    Depression     Diabetes mellitus     Diastolic heart failure secondary to hypertension     Gout     Hyperlipemia     Hypertension     Hypertrophy of nasal turbinates     Kidney mass 2020    Right    Levoscoliosis     Lung nodule     left    Multiple thyroid nodules     NICOLE (obstructive sleep apnea)     uses C-PAP    Pulmonary hypertension     Severe sepsis 07/01/2024     Past Surgical History:   Procedure Laterality Date    AORTOGRAPHY N/A 12/04/2020    Procedure: Aortogram;  Surgeon: Paul Pedersen MD;  Location: [facility];  Service: Cardiology;  Laterality: N/A;    BREAST SURGERY      CARDIAC CATHETERIZATION  12/2020    CHOLECYSTECTOMY      COLONOSCOPY      multi -last 2014     CORONARY ARTERY BYPASS GRAFT      ESOPHAGOGASTRODUODENOSCOPY      2012     HAND SURGERY Right     INSERTION OF CATHETER N/A 6/23/2024    Procedure: Insertion,catheter;  Surgeon: Meli Griffin MD;  Location: [facility] OR;  Service: Vascular;  Laterality: N/A;  ORIGINAL CATHETER WAS REPOSITIONED.  NO NEW CATHETER WAS PLACED    INSERTION OF TUNNELED CENTRAL VENOUS HEMODIALYSIS CATHETER Right 7/11/2024    Procedure: Insertion, Catheter, Central Venous, Hemodialysis;  Surgeon: Hawa Landa MD;   "Location: [facility] CATH LAB;  Service: Interventional Nephrology;  Laterality: Right;    INSERTION, CATHETER, DIALYSIS, PERITONEAL N/A 2024    Procedure: IN Insertion Catheter, Dialysis, Peritoneal - laparoscopic;  Surgeon: Rodriguez Catalan Jr., MD;  Location: [facility];  Service: General;  Laterality: N/A;    LAPAROSCOPIC ROBOT-ASSISTED SURGICAL REMOVAL OF KIDNEY USING DA CHELLE XI Right 03/10/2022    Procedure: XI ROBOTIC NEPHRECTOMY- radical;  Surgeon: Rolando Ramirez MD;  Location: [facility] OR;  Service: Urology;  Laterality: Right;    MASTECTOMY W/ SENTINEL NODE BIOPSY Bilateral 2015    bilateral "dog ears"    NASAL SINUS SURGERY      Dr Bryant FESS/cauterization turbinate     PARTIAL HYSTERECTOMY      PERCUTANEOUS TRANSLUMINAL BALLOON ANGIOPLASTY OF CORONARY ARTERY  2020    Procedure: Angioplasty-coronary;  Surgeon: Paul Pedersen MD;  Location: [facility] CATH;  Service: Cardiology;;    REMOVAL OF HEMODIALYSIS CATHETER  2024    Procedure: REMOVAL, CATHETER, HEMODIALYSIS;  Surgeon: Hawa Landa MD;  Location: [facility] CATH LAB;  Service: Interventional Nephrology;;    RENAL BIOPSY Right     2021 EJ    TUBAL LIGATION      ULTRASOUND GUIDANCE  2020    Procedure: Ultrasound Guidance;  Surgeon: Paul Pedersen MD;  Location: [facility] CATH;  Service: Cardiology;;     Family History       Problem Relation (Age of Onset)    Asthma Daughter    Birth defects Daughter    Breast cancer Mother, Maternal Aunt    Depression Daughter    Drug abuse Daughter, Daughter    Glaucoma Sister    Hepatitis Brother    Hypertension Father    Learning disabilities Daughter    Mental illness Daughter    Stroke Father          Tobacco Use    Smoking status: Former     Current packs/day: 0.00     Types: Cigarettes     Start date: 2016     Quit date: 2016     Years since quittin.5    Smokeless tobacco: Never    Tobacco comments:     quit    Substance and Sexual Activity    Alcohol use: No    " Drug use: No    Sexual activity: Not Currently     Partners: Male     Birth control/protection: None     Review of Systems   Constitutional:  Positive for fatigue. Negative for activity change and appetite change.   HENT: Negative.     Respiratory: Negative.     Gastrointestinal:  Negative for abdominal distention and abdominal pain.   Genitourinary:  Positive for decreased urine volume.   Musculoskeletal: Negative.    Skin: Negative.    All other systems reviewed and are negative.    Objective:     Vital Signs (Most Recent):  Temp: 97.5 °F (36.4 °C) (07/12/24 1501)  Pulse: 107 (07/12/24 1802)  Resp: (!) 22 (07/12/24 1802)  BP: 107/87 (07/12/24 1802)  SpO2: 97 % (07/12/24 1802) Vital Signs (24h Range):  Temp:  [97 °F (36.1 °C)-97.9 °F (36.6 °C)] 97.5 °F (36.4 °C)  Pulse:  [] 107  Resp:  [16-22] 22  SpO2:  [90 %-100 %] 97 %  BP: ()/(53-88) 107/87     Weight: 128.5 kg (283 lb 4.7 oz)  Body mass index is 44.37 kg/m².     Physical Exam  Vitals and nursing note reviewed.   Constitutional:       Appearance: She is obese. She is ill-appearing.   HENT:      Head: Normocephalic and atraumatic.   Neck:      Comments: Left neck trialysis catheter   Cardiovascular:      Rate and Rhythm: Normal rate and regular rhythm.   Pulmonary:      Effort: Pulmonary effort is normal. No respiratory distress.   Abdominal:      General: Abdomen is flat. There is no distension.      Palpations: Abdomen is soft. There is no mass.      Tenderness: There is no abdominal tenderness.      Hernia: No hernia is present.      Comments: Large pannus; PD catheter in place to left lower side of the pannus, clean and dry   Skin:     General: Skin is warm and dry.   Neurological:      General: No focal deficit present.      Mental Status: She is alert. Mental status is at baseline.   Psychiatric:         Mood and Affect: Mood normal.            I have reviewed all pertinent lab results within the past 24 hours.    Significant Diagnostics:  I  have reviewed all pertinent imaging results/findings within the past 24 hours.    Assessment/Plan:     * ESRD (end stage renal disease)  74 year old F w/ above history now tolerating HD, consult to gen surg for PD catheter removal.     - Plan for PD catheter removal in OR tomorrow   - Consent obtained. Placed in chart.   - NPO at midnight, hold heparin at midnight          VTE Risk Mitigation (From admission, onward)           Ordered     heparin 25,000 units in dextrose 5% (100 units/ml) IV bolus from bag LOW INTENSITY nomogram - [facility]  As needed (PRN)        Question:  Heparin Infusion Adjustment (DO NOT MODIFY ANSWER)  Answer:  [email or web address]\epic\Images\Pharmacy\HeparinInfusions\heparin LOW INTENSITY nomogram for [facility] [id number]    07/12/24 1058     heparin 25,000 units in dextrose 5% (100 units/ml) IV bolus from bag LOW INTENSITY nomogram - [facility]  As needed (PRN)        Question:  Heparin Infusion Adjustment (DO NOT MODIFY ANSWER)  Answer:  [email or web address]\epic\Images\Pharmacy\HeparinInfusions\heparin LOW INTENSITY nomogram for [facility] [id number]    07/12/24 1058     heparin 25,000 units in dextrose 5% 250 mL (100 units/mL) infusion LOW INTENSITY nomogram - [facility]  Continuous        Question:  Begin at (units/kg/hr)  Answer:  12    07/12/24 1058     Place sequential compression device  Until discontinued         07/03/24 0450                    Thank you for your consult. I will follow-up with patient. Please contact us if you have any additional questions.    Krista Hollins MD  General Surgery  [facility] - Cardiac Medical ICU

## 2024-07-14 NOTE — PROGRESS NOTES
[facility] - Cardiac Medical ICU  Critical Care Medicine  Progress Note    Patient Name: Juhi Taylor  MRN: [id number]  Admission Date: 7/3/2024  Hospital Length of Stay: 11 days  Code Status: Full Code  Attending Provider: Kristin Alvarado MD  Primary Care Provider: Tony Sadler MD   Principal Problem: ESRD (end stage renal disease)    Subjective:     HPI:  Juhi Taylor is a 74-year-old female with a past medical history of CKD stage 5 with recent PD catheter placement on 6/4, RCC s/p right nephrectomy, T2DM, obesity, CAD s/p PCI to Lcx and LAD in 11/2020 and HFmREF who is admitted as a transfer from [facility] for higher level of care and further cardiac evaluation. Nephrology consulted for hemodialysis. She initially presented on 6/18/24 to [facility] for hypervolemia in the setting of CKD 4-5 for which she had undergone elective PD catheter placement complicated by catheter site leaking. She had a complex hospital course, was initially placed on furosemide gtt without significant improvement and later underwent placement of tunneled line for HD with volume removal. During her second dialysis session she went into PEA arrest for which she was resuscitated, intubated and transferred to ICU. She was noted to have elevated troponin and subsequent TTE found reduced EF from 40-45% to 20-25% for which coronary angiography was recommended. Her hospital course was also complicated by dialysis line infection, significant bleeding around the catheter site, bradycardia and subsequent atrial fibrillation with RVR. Given the catheter site bleeding, anticoagulation was temporarily held. She has reported allergy to amiodarone, was initially placed on diltiazem for atrial fibrillation despite reduced EF. She discussed GOC at outside hospital where it appears that she lacked capacity per note and decided to pursue higher level of care at [facility]. At that time, gen surgery was planning to place a left tunneled catheter due to  concerns of the right side not being suitable     Upon admit, patient appeared drowsy but conversant. Daughter reports that she typically gets lethargic 2/2 to the volume overload due to not receiving dialysis. Patient reported b/l left lower extremity pain. Daughter concerned about her not having dialysis in 4 days and wants a session today.     Pt initially refusing dialysis. Seen by nephrology. VBG 7.220/33.4/34/13.7/-14. Critical care medicine consulted for emergent trialysis line placement and dialysis.     Hospital/ICU Course:  Ms. Taylor was admitted to MICU on 7/8 for urgent dialysis line placement and initiated of HD. Patient received short run of CRRT overnight without issue. Encephalopathy improving with continued CRRT treatments. Persistent AFib with RVR, received Digoxin loading dose (renally dosed), restarted PO Carvedilol. Patient remains in persistent AFib RVR despite therapeutic digoxin level and uptitration of Carvedilol levels, started on Amiodarone infusion overnight. Transitioned to amio PO, started on heparin gtt for afib AC. Tolerated HD well overnight without complications and less encephalopathic upon exam. Planning for gen surg to remove PD catheter. Stable at this time for stepdown with hospital medicine. Planning for HD session today 7/14 with removal of fluid per nephrology.     Interval History/Significant Events: No acute events overnight.     Review of Systems   Constitutional:  Positive for activity change and fatigue.   Respiratory:  Negative for cough and shortness of breath.    Cardiovascular:  Positive for leg swelling. Negative for chest pain.   Gastrointestinal:  Positive for abdominal pain. Negative for nausea and vomiting.   Musculoskeletal:  Negative for arthralgias and myalgias.   Neurological:  Negative for dizziness, light-headedness and headaches.   Psychiatric/Behavioral:  Negative for agitation and confusion.      Objective:     Vital Signs (Most Recent):  Temp:  96.8 °F (36 °C) (07/14/24 1116)  Pulse: 91 (07/14/24 1116)  Resp: (!) 23 (07/14/24 1116)  BP: 125/81 (07/14/24 1116)  SpO2: (!) 93 % (07/14/24 1116) Vital Signs (24h Range):  Temp:  [96.8 °F (36 °C)-99 °F (37.2 °C)] 96.8 °F (36 °C)  Pulse:  [] 91  Resp:  [18-23] 23  SpO2:  [93 %-99 %] 93 %  BP: ()/(56-81) 125/81   Weight: 128.5 kg (283 lb 4.7 oz)  Body mass index is 44.37 kg/m².      Intake/Output Summary (Last 24 hours) at 7/14/2024 1215  Last data filed at 7/14/2024 1200  Gross per 24 hour   Intake 346.15 ml   Output --   Net 346.15 ml          Physical Exam  Vitals and nursing note reviewed.   Constitutional:       General: She is awake.      Appearance: She is ill-appearing.   HENT:      Mouth/Throat:      Mouth: Mucous membranes are moist.      Pharynx: Oropharynx is clear.   Eyes:      Pupils: Pupils are equal, round, and reactive to light.   Cardiovascular:      Rate and Rhythm: Tachycardia present. Rhythm irregular.      Comments: afib  Pulmonary:      Effort: Pulmonary effort is normal.      Breath sounds: Normal breath sounds.   Chest:      Comments: Tunneled HD cath  Abdominal:      General: There is distension.      Palpations: Abdomen is soft.      Comments: PD cath   Musculoskeletal:         General: Swelling present. Normal range of motion.      Right lower leg: Edema present.      Left lower leg: Edema present.   Skin:     General: Skin is warm and dry.      Capillary Refill: Capillary refill takes less than 2 seconds.   Neurological:      General: No focal deficit present.      Mental Status: She is alert and oriented to person, place, and time. Mental status is at baseline.      GCS: GCS eye subscore is 4. GCS verbal subscore is 5. GCS motor subscore is 6.   Psychiatric:         Attention and Perception: Attention normal.         Mood and Affect: Mood normal.         Speech: Speech normal.         Behavior: Behavior normal. Behavior is cooperative.         Thought Content: Thought  content normal.         Judgment: Judgment normal.            Vents:  Oxygen Concentration (%): 28 (07/05/24 2320)  Lines/Drains/Airways       Central Venous Catheter Line  Duration                  Hemodialysis Catheter 07/11/24 1219 left subclavian 2 days              Peripheral Intravenous Line  Duration                  Midline Catheter - Single Lumen 07/12/24 0430 Right basilic vein (medial side of arm) 2 days         Peripheral IV - Single Lumen 07/12/24 1700 20 G 1 3/4 in Anterior;Distal;Right Forearm 1 day                  Significant Labs:    CBC/Anemia Profile:  Recent Labs   Lab 07/13/24  0342 07/14/24  0512   WBC 12.36 17.77*   HGB 10.3* 9.9*   HCT 32.9* 32.3*   * 145*   MCV 83 85   RDW 21.5* 21.7*        Chemistries:  Recent Labs   Lab 07/12/24  1233 07/13/24 0342 07/14/24 0512   * 132* 132*   K 4.8 4.0 4.3    99 100   CO2 17* 22* 22*   BUN 43* 33* 45*   CREATININE 5.1* 4.3* 5.7*   CALCIUM 8.3* 8.0* 8.2*   ALBUMIN 2.2* 2.2* 1.9*   PROT  --  5.2* 4.9*   BILITOT  --  1.1* 0.9   ALKPHOS  --  148* 156*   ALT  --  55* 43   AST  --  47* 30   MG 2.3 2.2 2.2   PHOS 5.2* 4.6* 5.6*       All pertinent labs within the past 24 hours have been reviewed.    Significant Imaging:  I have reviewed all pertinent imaging results/findings within the past 24 hours.    ABG  Recent Labs   Lab 07/09/24  0904   PH 7.281*   PO2 42   PCO2 47.0*   HCO3 22.1*   BE -5*     Assessment/Plan:     Cardiac/Vascular  Atrial fibrillation with RVR  AFib with RVR appears new following cardiac arrest at OSH. Per chart review, AFib was difficult to control. Patient endorses amiodarone reaction with itching, rash, and oral burning. Refused Sotalol, DCCV, and AICD placement at OSH. Was placed on Diltizem infusion and transitioned to PO metoprolol. Amio gtt restarted overnight 7/11 s/t uncontrolled afib rvr. Transitioned to PO amio.    - Heparin gtt initiated for AC  - Daily CMP  - Keep K>4, Mag >2    Acute on chronic  combined systolic and diastolic heart failure  Echo    Left Ventricle: The left ventricle is moderately dilated. Normal wall thickness. Global hypokinesis present. There is severely reduced systolic function with a visually estimated ejection fraction of 20 - 25%.    Right Ventricle: Mild right ventricular enlargement. Wall thickness is normal. Right ventricle wall motion has global hypokinesis. Systolic function is moderately reduced.    Left Atrium: Left atrium is severely dilated.    Right Atrium: Right atrium is severely dilated.    Aortic Valve: The aortic valve is a trileaflet valve. There is mild aortic valve sclerosis.    Mitral Valve: There is mild regurgitation.    Tricuspid Valve: There is severe regurgitation.    Pulmonary Artery: There is moderate pulmonary hypertension. The estimated pulmonary artery systolic pressure is 61 mmHg.    IVC/SVC: Elevated venous pressure at 15 mmHg.    Pericardium: There is a trivial circumferential effusion. No indication of cardiac tamponade.    - Will require interventional cardiology for LHC vs PET for reduced EF once euvolemic  - LifeVest prior to discharge  - Starting Entresto 24-26mg BID per cardiology recs; discussed with patient regarding allergy to irbesartan and stated does not recall any problems with taking it    Essential hypertension  - Changed home Carvedilol to Metoprolol 25mg BID per cardiology recs    Renal/  * ESRD (end stage renal disease)  Pt previously on PD. Unable to remove enough volume with PD catheter. Initially upgraded to MICU for emergent trialysis placement and CRRT. Now with tunneled dialysis catheter. Tolerated session of HD on 7/12 without complications.     - Nephrology following, appreciate recs  - Strict I/Os  - Renally dose meds  - Avoid nephrotoxic agents  - General surgery consulted to remove PD catheter  - Planning for HD session 7/14 to remove ~3L of fluid if tolerates    Endocrine  Type 2 diabetes mellitus with microalbuminuria,  without long-term current use of insulin  Latest A1C 6.8; on Metformin at home    - POC glucose  - Hypoglycemia protocol    Palliative Care  Palliative care encounter  - Palliative care following, continued GOC with family    ACP (advance care planning)  - Palliative care consulted, appreciate their assistance in clarification of GOC/POC with patient and family       Critical Care Daily Checklist:    A: Awake: RASS Goal/Actual Goal:    Actual:     B: Spontaneous Breathing Trial Performed?     C: SAT & SBT Coordinated?  N/A                      D: Delirium: CAM-ICU Overall CAM-ICU: Negative   E: Early Mobility Performed? Yes   F: Feeding Goal:    Status:     Current Diet Order   Procedures    Diet diabetic Renal, Consistent Carbohydrate, Low Phosphorus, Low Potassium; 2000 Calorie     Order Specific Question:   Additional Diet Options:     Answer:   Renal     Order Specific Question:   Additional Diet Options:     Answer:   Consistent Carbohydrate     Order Specific Question:   Additional Diet Options:     Answer:   Low Phosphorus     Order Specific Question:   Additional Diet Options:     Answer:   Low Potassium     Order Specific Question:   Total calories:     Answer:   2000 Calorie    Diet NPO Except for: Medication     Order Specific Question:   Except for     Answer:   Medication      AS: Analgesia/Sedation Tylenol PRN   T: Thromboembolic Prophylaxis Heparin gtt   H: HOB > 300 Yes   U: Stress Ulcer Prophylaxis (if needed) N/A   G: Glucose Control 140-180   B: Bowel Function Stool Occurrence: 1   I: Indwelling Catheter (Lines & Damon) Necessity Tunneled HD line, PIVs   D: De-escalation of Antimicrobials/Pharmacotherapies N/A    Plan for the day/ETD HD to remove fluid; awaiting SD bed    Code Status:  Family/Goals of Care: Full Code       I have spent 35 min with this patient, with over 50% of this time spent coordinating care and speaking with the family     Medina Clayton [facility]  Critical Care  Medicine  [facility] - Cardiac Medical ICU

## 2024-07-14 NOTE — PLAN OF CARE
MICU DAILY GOALS     Family/Goals of care/Code Status   Code Status: Full Code    24H Vital Sign Range  Temp:  [96.8 °F (36 °C)-99 °F (37.2 °C)]   Pulse:  []   Resp:  [18-23]   BP: ()/(56-81)   SpO2:  [93 %-99 %]      Shift Events (include procedures and significant events)   No acute events throughout shift. HD postponed d/t catheter not working. Will cath niecy per order. Step down orders still in. Pending bed placement     AWAKE RASS: Goal -    Actual - RASS (Jha Agitation-Sedation Scale): drowsy    Restraint necessity: Not necessary   BREATHE SBT: Not intubated    Coordinate A & B, analgesics/sedatives Pain: managed   SAT: Not intubated   Delirium CAM-ICU: Overall CAM-ICU: Negative   Early(intubated/ Progressive (non-intubated) Mobility MOVE Screen (INTUBATED ONLY): Not intubated    Activity: Activity Management: Rolling - L1   Feeding/Nutrition Diet order: Diet/Nutrition Received: consistent carb/diabetic diet, Specialty Diet/Nutrition Received: renal diet   Thrombus DVT prophylaxis: VTE Required Core Measure: Pharmacological prophylaxis initiated/maintained   HOB Elevation Head of Bed (HOB) Positioning: HOB elevated   Ulcer Prophylaxis GI: no   Glucose control managed Glycemic Management: blood glucose monitored   Skin Skin assessed during: Daily Assessment    Sacrum intact/not altered? Yes  Heels intact/not altered? Yes  Surgical wound? No    CHECK ONE!   (no altered skin or altered skin) and sub boxes:  [] No Altered Skin Integrity Present    []Prevention Measures Documented    [x] Altered Skin Integrity Present or Discovered   [x] LDA present in EPIC, daily doc completed              [] LDA added if not in EPIC (describe wound).                    When describing wound, do not stage, use descriptive words only.    [] Wound Image Taken (required on admit,                   transfer/discharge and every Tuesday)    Wound Care Consulted? No    4 EYES:  Attending Nurse (1st set of eyes):      Second RN/Staff Member (2nd set of eyes):    Bowel Function no issues    Indwelling Catheter Necessity      [REMOVED] Trialysis (Dialysis) Catheter 07/08/24 2210 left internal jugular-Line Necessity Review: CRRT/HD       Hemodialysis Catheter 07/11/24 1219 left subclavian-Line Necessity Review: CRRT/HD     De-escalation Antibiotics No        VS and assessment per flow sheet, patient progressing towards goals as tolerated, plan of care reviewed with  patient and family , all concerns addressed, will continue to monitor.

## 2024-07-14 NOTE — ANESTHESIA PREPROCEDURE EVALUATION
[facility]  Anesthesia Pre-Operative Evaluation     Patient Name: Juhi Taylor  YOB: 1950  MRN: [id number]  CSN: [id number]       Admit Date: 7/3/2024   Admit Team: Networked reference to record PCT   Hospital Day: 12  Date of Procedure: 7/15/2024  Anesthesia: General Procedure: Procedure(s) (LRB):  REMOVAL, CATHETER, DIALYSIS, PERITONEAL (N/A)  Pre-Operative Diagnosis: CKD stage 4 secondary to hypertension [I12.9, N18.4]  Proceduralist:Surgeons and Role:     * Tanya Beckham MD - Primary  Code Status: Full Code   Advanced Directive: <no information>  Isolation Precautions: No active isolations  Capacity: Full capacity       SUBJECTIVE:     Pre-operative evaluation for Procedure(s) (LRB):  REMOVAL, CATHETER, DIALYSIS, PERITONEAL (N/A)  07/14/2024  Hospital LOS: 11 days  ICU LOS: 5d 20h    Juhi Taylor is a 74 y.o. female w/ a significant PMHx of CHF, NICOLE, ESRD with PD catheter, RR s/p nephrectomy, DM, CAD s/p PCI to Lcx and LAD who presented OSH with volume overload who had PD catheter placement c/b leak. PEA arrest during HD session. Course also c.b reduced EF to 25%, dialysis line infection, AF with RVR and [place] [facility] [place]. General surgery planning    PAPER CONSENT IN CHART     TTE:     Left Ventricle: The left ventricle is moderately dilated. Normal wall   thickness. Global hypokinesis present. There is severely reduced systolic   function with a visually estimated ejection fraction of 20 - 25%.    Right Ventricle: Mild right ventricular enlargement. Wall thickness is   normal. Right ventricle wall motion has global hypokinesis. Systolic   function is moderately reduced.    Left Atrium: Left atrium is severely dilated.    Right Atrium: Right atrium is severely dilated.    Aortic Valve: The aortic valve is a trileaflet valve. There is mild   aortic valve sclerosis.    Mitral Valve: There is mild regurgitation.    Tricuspid  "Valve: There is severe regurgitation.    Pulmonary Artery: There is moderate pulmonary hypertension. The   estimated pulmonary artery systolic pressure is 61 mmHg.    IVC/SVC: Elevated venous pressure at 15 mmHg.       No results found for this or any previous visit.  MILLY:  No results found for this or any previous visit.  Stress Test:  Results for orders placed during the hospital encounter of 09/16/21    Nuclear Stress - Cardiology Interpreted    Interpretation Summary    Abnormal myocardial perfusion scan.  No reversible ischemia.    There is a mild to moderate intensity, small to moderate sized, fixed defect consistent with scar in the inferobasilar wall(s).    The gated perfusion images showed an ejection fraction of 30% at rest.    The EKG portion of this study is negative for ischemia.    There were no arrhythmias during stress.     Cardiac Catheterization:  Results for orders placed during the hospital encounter of 07/03/24    Cardiac catheterization    Narrative  Procedure performed in the Invasive Lab  - See Procedure Log link below for nursing documentation  - See OpNote on Surgeries Tab for physician findings  - See Imaging Tab for radiologist dictation     PFT:  No results found for: "FEV1", "FVC", "[id number]", "TLC", "DLCO"     NPO Status:     LDA:        Hemodialysis Catheter 07/11/24 1219 left subclavian (Active)   Line Necessity Review CRRT/HD 07/14/24 0701   Verification by X-ray Yes 07/14/24 0701   Site Assessment No drainage;No warmth;Tender;No redness 07/14/24 1501   Line Securement Device Secured with sutures 07/14/24 0701   Dressing Type CHG impregnated dressing/sponge;Central line dressing 07/14/24 1501   Dressing Status Intact 07/14/24 1501   Dressing Intervention Integrity maintained 07/14/24 1501   Date on Dressing 07/13/24 07/14/24 0701   Dressing Due to be Changed 07/20/24 07/14/24 1501   Venous Patency/Care deaccessed 07/14/24 1501   Arterial Patency/Care deaccessed 07/14/24 1501   Number " of days: 3            Peripheral IV - Single Lumen 07/12/24 1700 20 G 1 3/4 in Anterior;Distal;Right Forearm (Active)   Site Assessment Clean;Dry;Intact;No redness;No swelling 07/14/24 1501   Extremity Assessment Distal to IV No abnormal discoloration;No redness;No swelling;No warmth 07/14/24 1501   Line Status Infusing 07/14/24 1501   Dressing Status Clean;Dry;Intact 07/14/24 1501   Dressing Intervention Integrity maintained 07/14/24 1501   Dressing Change Due 07/16/24 07/14/24 1501   Site Change Due 07/16/24 07/14/24 0701   Reason Not Rotated Not due 07/14/24 1501   Number of days: 1            Midline Catheter - Single Lumen 07/12/24 0430 Right basilic vein (medial side of arm) (Active)   $ Midline Charges (Upon insertion) Midline Catheter (Supply) 07/12/24 0500   Site Assessment Clean;Dry;Intact;No redness;No swelling 07/14/24 1501   IV Device Securement catheter securement device 07/14/24 1501   Line Status Saline locked 07/14/24 1501   Dressing Type Transparent (Tegaderm) 07/14/24 1501   Dressing Status Clean;Dry;Intact 07/14/24 1501   Dressing Intervention Integrity maintained 07/14/24 1501   Dressing Change Due 07/19/24 07/14/24 1501   Site Change Due 08/11/24 07/14/24 0701   Reason Not Rotated Not due 07/14/24 1501   Number of days: 2                Drips:    heparin (porcine) in D5W  0-40 Units/kg/hr (Adjusted) Intravenous Continuous 11.5 mL/hr at 07/14/24 1526 13 Units/kg/hr at 07/14/24 1526       Previous Airway: Induction:  Intravenous    Intubated:  Postinduction    Mask Ventilation:  Easy mask    Attempts:  1    Attempted By:  CRNA    Method of Intubation:  Video laryngoscopy    Blade:  Glidescope 3    Laryngeal View Grade: Grade I - full view of cords      Difficult Airway Encountered?: No      Complications:  None    Airway Device:  Oral endotracheal tube    Airway Device Size:  7.0    Style/Cuff Inflation:  Cuffed    Inflation Amount (mL):  8    Tube secured:  21    Secured at:  The teeth     Placement Verified By:  Capnometry    Complicating Factors:  None    Findings Post-Intubation:  BS equal bilateral and atraumatic/condition of teeth unchanged       Allergies:  Review of patient's allergies indicates:   Allergen Reactions    Percocet [oxycodone-acetaminophen] Itching    Amiodarone analogues      Itching      Januvia [sitagliptin]     Jardiance [empagliflozin]      Leg cramps    Lipitor [atorvastatin] Other (See Comments)     Severe leg pain    Linaclotide Other (See Comments) and Nausea And Vomiting     Does not remember    Lubiprostone Other (See Comments) and Palpitations     Does not remember       Medications:     Current Outpatient Medications   Medication Instructions    allopurinoL (ZYLOPRIM) 300 mg, Oral, Daily    amLODIPine (NORVASC) 10 mg, Oral, Daily    blood sugar diagnostic (TRUE METRIX GLUCOSE TEST STRIP) Strp Use 1x daily. Insurance preferred.    blood sugar diagnostic Strp To check BG 1 time daily, to use with insurance preferred meter    calcitRIOL (ROCALTROL) 0.5 mcg, Oral, Daily    carvediloL (COREG) 25 mg, Oral, 2 times daily    cloNIDine (CATAPRES) 0.1 mg, Oral, 3 times daily PRN    cloNIDine 0.1 mg/24 hr td ptwk (CATAPRES) 0.1 mg/24 hr 1 patch, Transdermal, Every 7 days    cyanocobalamin (VITAMIN B-12) 100 mcg, Oral, Daily    ELIQUIS 5 mg, Oral, 2 times daily    furosemide (LASIX) 40 mg, Oral, Daily    lancets Misc To check BG 1 times daily, to use with insurance preferred meter    loratadine (CLARITIN) 10 mg, Oral, Daily    REPATHA SURECLICK 140 mg, Subcutaneous, Every 14 days     Inpatient Medications:   0.9% NaCl   Intravenous Once    amiodarone  400 mg Oral BID    Followed by    [START ON 7/22/2024] amiodarone  200 mg Oral Daily    aspirin  81 mg Oral Daily    gentamicin   Topical (Top) TID    metoprolol tartrate  25 mg Oral BID    mupirocin   Nasal BID    sacubitriL-valsartan  1 tablet Oral BID     Antibiotics (From admission, onward)      Start     Stop Route Frequency  Ordered    07/13/24 2100  mupirocin 2 % ointment         07/18/24 2059 Nasl 2 times daily 07/13/24 1729    07/03/24 1500  gentamicin 0.1 % ointment         -- Top 3 times daily 07/03/24 1205          VTE Risk Mitigation (From admission, onward)           Ordered     heparin (porcine) injection 1,000 Units  As needed (PRN)         07/14/24 1518     heparin 25,000 units in dextrose 5% (100 units/ml) IV bolus from bag LOW INTENSITY nomogram - [facility]  As needed (PRN)        Question:  Heparin Infusion Adjustment (DO NOT MODIFY ANSWER)  Answer:  \\[email or web address]\Images\Pharmacy\HeparinInfusions\heparin LOW INTENSITY nomogram for [facility] [id number]    07/12/24 1058     heparin 25,000 units in dextrose 5% (100 units/ml) IV bolus from bag LOW INTENSITY nomogram - [facility]  As needed (PRN)        Question:  Heparin Infusion Adjustment (DO NOT MODIFY ANSWER)  Answer:  \[email or web address]\epic\Images\Pharmacy\HeparinInfusions\heparin LOW INTENSITY nomogram for [facility] [id number]    07/12/24 1058     heparin 25,000 units in dextrose 5% 250 mL (100 units/mL) infusion LOW INTENSITY nomogram - [facility]  Continuous        Question:  Begin at (units/kg/hr)  Answer:  12    07/12/24 1058     Place sequential compression device  Until discontinued         07/03/24 0450                    History:     Active Hospital Problems    Diagnosis  POA    *ESRD (end stage renal disease) [N18.6]  Yes    ACP (advance care planning) [Z71.89]  Not Applicable    Palliative care encounter [Z51.5]  Not Applicable    Acute on chronic combined systolic and diastolic heart failure [I50.43]  Yes    Atrial fibrillation with RVR [I48.91]  Yes    Acute respiratory failure with hypoxia [J96.01]  Yes    Severe obesity (BMI >= 40) [E66.01]  Yes    Essential hypertension [I10]  Yes    Type 2 diabetes mellitus with microalbuminuria, without long-term current use of insulin [E11.29, R80.9]  Yes     Chronic      Resolved Hospital Problems    Diagnosis Date Resolved POA    Hypoxic  respiratory failure [J96.91] 07/12/2024 Yes     73F w/ ESRD hx RCC s/p R nephrectomy, HFrEF, CAD s/p PCI, paroxysmal Atrial fibrillation, T2DM obesity, and initial outside hospitalization for PD catheter malfunction and ADHF iso of volume overload complicated by PEA arrest with conversion to VT after epi and cardioversion => ROSC and recurrent AF w/ RVR.    Respiratory failure precipitated by volume overload 2/2 failed dialysis access and HFrEF complicated by PEA arrest during dialysis session at [facility]. EF following arrest 20%, previously 40%. [place] [facility] with venous pressure 15mmHg. PD catheter tested by nephrology with success, currently on nightly PD. New trialysis line if volume status not adequately corrected with PD.            Severe sepsis [A41.9, R65.20] 07/08/2024 Yes    CKD stage 4 secondary to hypertension [I12.9, N18.4] 07/14/2024 Yes    GERD (gastroesophageal reflux disease) [K21.9] 07/03/2024 Yes    Lower leg edema [R60.0] 07/03/2024 Yes     Medical History:  Past Medical History:   Diagnosis Date    Anticoagulant long-term use     Arthritis     Breast cancer 2014    invasive lobular carcinoma    Cancer of kidney 11/2020    RIGHT KIDNEY CANCER    CHF (congestive heart failure)     Coronary artery disease dx 2005    Depression     Diabetes mellitus     Diastolic heart failure secondary to hypertension     Gout     Hyperlipemia     Hypertension     Hypertrophy of nasal turbinates     Kidney mass 2020    Right    Levoscoliosis     Lung nodule     left    Multiple thyroid nodules     NICOLE (obstructive sleep apnea)     uses C-PAP    Pulmonary hypertension     Severe sepsis 07/01/2024     Surgical History:    has a past surgical history that includes Tubal ligation; Cholecystectomy; Hand surgery (Right); Esophagogastroduodenoscopy; Colonoscopy; Cardiac catheterization (12/2020); Aortography (N/A, 12/04/2020); Percutaneous transluminal balloon angioplasty of coronary artery (12/04/2020);  Ultrasound guidance (12/04/2020); Partial hysterectomy (1989); Mastectomy w/ sentinel node biopsy (Bilateral, 01/21/2015); Nasal sinus surgery (2015); Renal biopsy (Right); Laparoscopic robot-assisted surgical removal of kidney using da Poncho Xi (Right, 03/10/2022); Breast surgery; Coronary artery bypass graft; insertion, catheter, dialysis, peritoneal (N/A, 6/4/2024); Insertion of catheter (N/A, 6/23/2024); Insertion of tunneled central venous hemodialysis catheter (Right, 7/11/2024); and Removal of hemodialysis catheter (7/11/2024).   Social History:    reports that she is not currently sexually active and has had partner(s) who are male. She reports using the following method of birth control/protection: None.  reports that she quit smoking about 7 years ago. Her smoking use included cigarettes. She started smoking about 7 years ago. She has never used smokeless tobacco. She reports that she does not drink alcohol and does not use drugs.    OBJECTIVE:   Vital Signs Range (Last 24H):  Temp:  [36 °C (96.8 °F)-37.2 °C (99 °F)]   Pulse:  []   Resp:  [18-23]   BP: ()/(56-81)   SpO2:  [93 %-99 %]   Lab Results   Component Value Date    WBC 17.77 (H) 07/14/2024    HGB 9.9 (L) 07/14/2024    HCT 32.3 (L) 07/14/2024     (L) 07/14/2024     (L) 07/14/2024    K 4.3 07/14/2024     07/14/2024    CREATININE 5.7 (H) 07/14/2024    BUN 45 (H) 07/14/2024    CO2 22 (L) 07/14/2024    GLU 98 07/14/2024    CALCIUM 8.2 (L) 07/14/2024    MG 2.2 07/14/2024    PHOS 5.6 (H) 07/14/2024    ALKPHOS 156 (H) 07/14/2024    ALT 43 07/14/2024    AST 30 07/14/2024    ALBUMIN 1.9 (L) 07/14/2024    INR 1.1 07/12/2024    APTT 43.9 (H) 07/14/2024    GLUF 238 (H) 03/28/2005    HGBA1C 6.8 (H) 06/19/2024     (H) 06/27/2024    TROPONINI 0.063 (H) 04/09/2022    BNP 1,726 (H) 06/20/2024    NTPROBNP 7980 (H) 01/20/2022     Recent Labs     07/12/24  0403 07/12/24  1233 07/13/24  0342 07/14/24  0512   WBC 11.08  --  12.36  "17.77*   HGB 10.1*  --  10.3* 9.9*   HCT 31.4*  --  32.9* 32.3*     --  125* 145*   * 131* 132* 132*   K 4.4 4.8 4.0 4.3   CREATININE 4.7* 5.1* 4.3* 5.7*   * 94 81 98   INR  --  1.1  --   --      No results for input(s): "PH", "PCO2", "PO2", "HCO3", "POCSATURATED", "BE" in the last 72 hours.   No LMP recorded. Patient has had a hysterectomy.    Please see Results Review for additional labs & imaging.     EKG:   Results for orders placed or performed during the hospital encounter of 06/18/24   EKG 12-lead    Collection Time: 07/01/24 10:26 AM   Result Value Ref Range    QRS Duration 140 ms    [facility] QTC Calculation 422 ms    Narrative    Test Reason : R07.89,    Vent. Rate : 105 BPM     Atrial Rate : 000 BPM     P-R Int : 000 ms          QRS Dur : 140 ms      QT Int : 320 ms       P-R-T Axes : 000 -39 149 degrees     QTc Int : 422 ms    Atrial fibrillation with rapid ventricular response  Left axis deviation  LVH with QRS widening and repolarization abnormality ( R in aVL , [place]  product )  Abnormal ECG  When compared with ECG of 30-JUN-2024 23:17,  No significant change was found  Confirmed by Jacky BACA, Kvng Celeste ([id number]) on 7/4/2024 12:25:17 PM    Referred By: [email or web address]   SELF           Confirmed By:Kvng Rico MD       ECHO:  See subjective, if available.    ASSESSMENT/PLAN:         Pre-op Assessment    I have reviewed the Patient Summary Reports.     I have reviewed the Nursing Notes. I have reviewed the NPO Status.   I have reviewed the Medications.     Review of Systems  Anesthesia Hx:  No problems with previous Anesthesia             Denies Family Hx of Anesthesia complications.    Denies Personal Hx of Anesthesia complications.                    Hematology/Oncology:  Hematology Normal   Oncology Normal                Hematology Comments: On eliquis at home, heparin drip off at 5am                    Cardiovascular:     Hypertension, well controlled   Denies MI.        Denies " Angina. CHF                                 Pulmonary:    Denies COPD.  Denies Asthma.   Denies Shortness of breath.  Sleep Apnea                Renal/:  Chronic Renal Disease, ESRD, Dialysis                Hepatic/GI:      Denies Liver Disease.            Musculoskeletal:  Arthritis               Neurological:  Denies TIA.  Denies CVA.    Denies Seizures.                                Endocrine:  Diabetes           Psych:  Psychiatric History                  Physical Exam  General: Well nourished, Cooperative, Alert and Oriented    Airway:  Mallampati: III     Dental:  Dentures        Anesthesia Plan  Type of Anesthesia, risks & benefits discussed:    Anesthesia Type: Gen ETT, Gen Supraglottic Airway, Gen Natural Airway, MAC  Intra-op Monitoring Plan: Standard ASA Monitors  Post Op Pain Control Plan: multimodal analgesia and IV/PO Opioids PRN  Induction:  IV  Airway Plan: Direct and Video  Informed Consent: Informed consent signed with the Patient and all parties understand the risks and agree with anesthesia plan.  All questions answered. Patient consented to blood products? Yes  ASA Score: 4  Day of Surgery Review of History & Physical: H&P Update referred to the surgeon/provider.    Ready For Surgery From Anesthesia Perspective.     .

## 2024-07-14 NOTE — ASSESSMENT & PLAN NOTE
Pt previously on PD. Unable to remove enough volume with PD catheter. Initially upgraded to MICU for emergent trialysis placement and CRRT. Now with tunneled dialysis catheter. Tolerated session of HD on 7/12 without complications.     - Nephrology following, appreciate recs  - Strict I/Os  - Renally dose meds  - Avoid nephrotoxic agents  - General surgery consulted to remove PD catheter  - Planning for HD session 7/14 to remove ~3L of fluid if tolerates

## 2024-07-14 NOTE — PT/OT/SLP RE-EVAL
Physical Therapy Re-Evaluation    Patient Name:  Juhi Taylor   MRN:  [id number]  Admit Date: 7/3/2024  Admitting Diagnosis:  ESRD (end stage renal disease)  Length of Stay: 11 days  Recent Surgery: Procedure(s) (LRB):  Insertion, Catheter, Central Venous, Hemodialysis (Right)  REMOVAL, CATHETER, HEMODIALYSIS 3 Days Post-Op    Recommendations:     Discharge Recommendations:  Moderate intensity therapy at discharge  Discharge Equipment Recommendations: walker, rolling, bedside commode   DME Justification:   Patient demonstrates a mobility limitation that significantly impairs their ability to participate in one or more mobility related activities of daily living. Patient's mobility limitation cannot be sufficiently resolved with the use of a cane, but can be sufficiently resolved with the use of a rolling walker.The use of a rolling walker will considerably improve their ability to participate in MRADLs. Patient will use the walker on a regular basis at home.   Patient has a mobility limitation that significantly impairs their ability to participate in one or more mobility related activities of daily living, including toileting. This deficit can be resolved by using a bedside commode. Patient demonstrates mobility limitations that will cause them to be confined to one room at home without bathroom access for up to 30 days. Using a bedside commode will greatly improve the patient's ability to participate in MRADLs.  Barriers to discharge: increase need of assistance     Highest Level of Mobility: bed mobility  Assistance Needed: maximal assistance    Assessment:     Juhi Taylor is a 74 y.o. female admitted with a medical diagnosis of ESRD (end stage renal disease). Patient required a re-evaluation today after transferring to the ICU on 7/8 due to medical decline. She is most limited today by poor activity tolerance. Today, she was able to perform bed mobility. Based on clinical presentation,  "co-morbidities, and today's performance, patient would benefit from acute skilled physical therapy services to improve functional mobility and return to max capacity prior level of function. Patient currently demonstrates appropriate participation and motivation for moderate intensity therapy. The patient exhibits the ability to participate in therapy services on a daily basis after discharging from the hospital.  See detailed evaluation below:    Problem List: weakness, impaired endurance, impaired sensation, impaired self care skills, impaired functional mobility, impaired balance, decreased upper extremity function, decreased lower extremity function, decreased safety awareness, pain, decreased ROM, edema, impaired cardiopulmonary response to activity  Rehab Prognosis: Good    Plan:     During this hospitalization, patient to be seen 4 x/week to address the identified impairments via gait training, therapeutic activities, therapeutic exercises, neuromuscular re-education and progress towards the established goals.    Plan of Care Expires:  08/13/24    Subjective   Communicated with RN prior to session.  Patient found HOB elevated upon PT entry to room, agreeable to evaluation.     Chief Complaint: weakness  Patient/Family Comments/goals: to feel better  Pain/Comfort:  Pain Rating 1:  (did not rate)  Location - Orientation 1: generalized  Location 1: abdomen  Pain Addressed 1: Reposition, Distraction, Cessation of Activity  Pain Rating Post-Intervention 1:  (did not rate)    Objective:   Patient found with: telemetry, pulse ox (continuous), peripheral IV, central line, Trialysis, blood pressure cuff     General Precautions: Standard, fall   Orthopedic Precautions:N/A   Braces: N/A   Oxygen Device: Room Air  Vitals: /81   Pulse 91   Temp 96.8 °F (36 °C) (Axillary)   Resp (!) 23   Ht 5' 7" (1.702 m)   Wt 128.5 kg (283 lb 4.7 oz)   SpO2 (!) 93%   Breastfeeding No   BMI 44.37 kg/m²     Exams:  Cognition: "   Alert and Cooperative  Command following: Follows one-step commands  Fluency: clear  Hearing: Intact  Vision:  Intact visual fields  Skin Integrity: LE edema, UE edema, and bleeding from recent line removal to neck  Sensation:  bilateral LE impaired to light touch  Coordination: intact  LE Strength:  L Lower Extremity: hip extension 2+/5, hip flexion 2+/5, knee extension 2+/5, knee flexion 3-/5, dorsiflexion 3-/5, and plantarflexion 3-/5  R Lower Extremity: hip extension 2+/5, hip flexion 2+/5, knee extension 2+/5, knee flexion 3-/5, dorsiflexion 3-/5, and plantarflexion 3-/5  LE ROM:  L Lower Extremity: limited by edema  R Lower Extremity: limited by edema      Outcome Measures:  AM-PAC 6 CLICK MOBILITY  Turning over in bed (including adjusting bedclothes, sheets and blankets)?: 2  Sitting down on and standing up from a chair with arms (e.g., wheelchair, bedside commode, etc.): 1  Moving from lying on back to sitting on the side of the bed?: 2  Moving to and from a bed to a chair (including a wheelchair)?: 1  Need to walk in hospital room?: 1  Climbing 3-5 steps with a railing?: 1  Basic Mobility Total Score: 8     Functional Mobility:    Bed Mobility:  Rolling/Turning to Left: maximal assistance  Rolling/Turning to Right: maximal assistance  Supine to Sit: maximal assistance  Scooting anteriorly to EOB to have both feet planted on floor: maximal assistance  Sit to Supine: maximal assistance    Patient declined progressing mobility, needed to remain on bedpan that was placed during mobility    Neuro Re-Education:   Patient provided with the following neuro based interventions: verbal cueing and education for optimal posture and manual cueing and education for optimal posture    Therapeutic Activities:   Patient participated in the following therapeutic activities: bed mobility    Education:   Patient/family was educated on the following: role of PT, plan of care, and goals, in-room safety and use of call button,  importance of upright mobility and exercise, and balancing rest and activity      Patient left HOB elevated with all lines intact, call button in reach, and RN notified.    GOALS:   Multidisciplinary Problems       Physical Therapy Goals          Problem: Physical Therapy    Goal Priority Disciplines Outcome Goal Variances Interventions   Physical Therapy Goal     PT, PT/OT Progressing     Description: PT goals to be met by: 8/13/24    Patient will perform rolling each way with supervision.   Patient will perform supine <> sitting with supervision.  Patient will perform sit <> stand transitions with supervision using RW.  Patient will perform transfers from bed <> chair or BSC with supervision using RW.  Patient will ambulate 50 feet with stand by assistance using RW.                       History:     Past Medical History:   Diagnosis Date    Anticoagulant long-term use     Arthritis     Breast cancer 2014    invasive lobular carcinoma    Cancer of kidney 11/2020    RIGHT KIDNEY CANCER    CHF (congestive heart failure)     Coronary artery disease dx 2005    Depression     Diabetes mellitus     Diastolic heart failure secondary to hypertension     Gout     Hyperlipemia     Hypertension     Hypertrophy of nasal turbinates     Kidney mass 2020    Right    Levoscoliosis     Lung nodule     left    Multiple thyroid nodules     NICOLE (obstructive sleep apnea)     uses C-PAP    Pulmonary hypertension     Severe sepsis 07/01/2024       Past Surgical History:   Procedure Laterality Date    AORTOGRAPHY N/A 12/04/2020    Procedure: Aortogram;  Surgeon: Paul Pedersen MD;  Location: [facility] CATH;  Service: Cardiology;  Laterality: N/A;    BREAST SURGERY      CARDIAC CATHETERIZATION  12/2020    CHOLECYSTECTOMY      COLONOSCOPY      multi -last 2014     CORONARY ARTERY BYPASS GRAFT      ESOPHAGOGASTRODUODENOSCOPY      2012     HAND SURGERY Right     INSERTION OF CATHETER N/A 6/23/2024    Procedure: Insertion,catheter;  Surgeon:  "Meli Griffin MD;  Location: [facility] OR;  Service: Vascular;  Laterality: N/A;  ORIGINAL CATHETER WAS REPOSITIONED.  NO NEW CATHETER WAS PLACED    INSERTION OF TUNNELED CENTRAL VENOUS HEMODIALYSIS CATHETER Right 7/11/2024    Procedure: Insertion, Catheter, Central Venous, Hemodialysis;  Surgeon: Hawa Landa MD;  Location: [facility] CATH LAB;  Service: Interventional Nephrology;  Laterality: Right;    INSERTION, CATHETER, DIALYSIS, PERITONEAL N/A 6/4/2024    Procedure: IN Insertion Catheter, Dialysis, Peritoneal - laparoscopic;  Surgeon: Rodriguez Catalan Jr., MD;  Location: [facility] OR;  Service: General;  Laterality: N/A;    LAPAROSCOPIC ROBOT-ASSISTED SURGICAL REMOVAL OF KIDNEY USING DA CHELLE XI Right 03/10/2022    Procedure: XI ROBOTIC NEPHRECTOMY- radical;  Surgeon: Rolando Ramirez MD;  Location: [facility];  Service: Urology;  Laterality: Right;    MASTECTOMY W/ SENTINEL NODE BIOPSY Bilateral 01/21/2015    bilateral "dog ears"    NASAL SINUS SURGERY  2015    Dr Bryant FESS/cauterization turbinate     PARTIAL HYSTERECTOMY  1989    PERCUTANEOUS TRANSLUMINAL BALLOON ANGIOPLASTY OF CORONARY ARTERY  12/04/2020    Procedure: Angioplasty-coronary;  Surgeon: Paul Pedersen MD;  Location: [facility] CATH;  Service: Cardiology;;    REMOVAL OF HEMODIALYSIS CATHETER  7/11/2024    Procedure: REMOVAL, CATHETER, HEMODIALYSIS;  Surgeon: Hawa Landa MD;  Location: [facility] CATH LAB;  Service: Interventional Nephrology;;    RENAL BIOPSY Right     9/20/2021 EJ    TUBAL LIGATION      ULTRASOUND GUIDANCE  12/04/2020    Procedure: Ultrasound Guidance;  Surgeon: Paul Pedersen MD;  Location: [facility] CATH;  Service: Cardiology;;       Time Tracking:     PT Received On: 07/14/24  PT Start Time: 1028     PT Stop Time: 1044  PT Total Time (min): 16 min     Billable Minutes: Re-eval 8 and Therapeutic Activity 8    Amelia Ramirez, PT, DPT  7/14/2024    "

## 2024-07-14 NOTE — ASSESSMENT & PLAN NOTE
Echo    Left Ventricle: The left ventricle is moderately dilated. Normal wall thickness. Global hypokinesis present. There is severely reduced systolic function with a visually estimated ejection fraction of 20 - 25%.    Right Ventricle: Mild right ventricular enlargement. Wall thickness is normal. Right ventricle wall motion has global hypokinesis. Systolic function is moderately reduced.    Left Atrium: Left atrium is severely dilated.    Right Atrium: Right atrium is severely dilated.    Aortic Valve: The aortic valve is a trileaflet valve. There is mild aortic valve sclerosis.    Mitral Valve: There is mild regurgitation.    Tricuspid Valve: There is severe regurgitation.    Pulmonary Artery: There is moderate pulmonary hypertension. The estimated pulmonary artery systolic pressure is 61 mmHg.    IVC/SVC: Elevated venous pressure at 15 mmHg.    Pericardium: There is a trivial circumferential effusion. No indication of cardiac tamponade.    - Will require interventional cardiology for LHC vs PET for reduced EF once euvolemic  - LifeVest prior to discharge  - Starting Entresto 24-26mg BID per cardiology recs; discussed with patient regarding allergy to irbesartan and stated does not recall any problems with taking it

## 2024-07-14 NOTE — PROGRESS NOTES
[facility] - Card[facility] Medical [facility]  Nephrology  Progress Note    Patient Name: Juhi Taylor  MRN: [id number]  Admission Date: 7/3/2024  Hospital Length of Stay: 11 days  Attending Provider: Kristin Alvarado MD   Primary Care Physician: Tony Sadler MD  Principal Problem:ESRD (end stage renal disease)    Subjective:     HPI: 74-year-old female with prior history of CKD stage 5 with recent PD catheter placement on 6/4, RCC s/p right nephrectomy, T2DM, obesity, CAD s/p PCI to Lcx and LAD in 11/2020 and HFmREF who is admitted as a transfer from [facility] for higher level of care and further cardiac evaluation. Nephrology consulted for hemodialysis.     She initially presented on 6/18/24 to [facility] for hypervolemia in the setting of CKD 4-5 for which she had undergone elective PD catheter placement complicated by catheter site leaking. She had a complex hospital course, was initially placed on furosemide gtt without significant improvement and later underwent placement of tunneled line for HD with volume removal. During her second dialysis session she went into PEA arrest for which she was resuscitated, intubated and transferred to ICU. She was noted to have elevated troponin and subsequent TTE found reduced EF from 40-45% to 20-25% for which coronary angiography was recommended. Her hospital course was also complicated by dialysis line infection, significant bleeding around the catheter site, bradycardia and subsequent atrial fibrillation with RVR. Given the catheter site bleeding, anticoagulation was temporarily held. She has reported allergy to amiodarone, was initially placed on diltiazem for atrial fibrillation despite reduced EF. She discussed GOC at outside hospital where it appears that she lacked capacity per note and decided to pursue higher level of care at [facility]. At that time, gen surgery was planning to place a left tunneled catheter due to concerns of the right side not being suitable    Upon admit, patient  appeared drowsy but conversant. Daughter reports that she typically gets lethargic 2/2 to the volume overload due to not receiving dialysis. Patient reported b/l left lower extremity pain. Daughter concerned about her not having dialysis in 4 days and wants a session today.     Interval History:   On exam looks edematous, plan for HD today UF 3 L if able to tolerate it  Review of patient's allergies indicates:   Allergen Reactions    Percocet [oxycodone-acetaminophen] Itching    Amiodarone analogues      Itching      Januvia [sitagliptin]     Jardiance [empagliflozin]      Leg cramps    Lipitor [atorvastatin] Other (See Comments)     Severe leg pain    Linaclotide Other (See Comments) and Nausea And Vomiting     Does not remember    Lubiprostone Other (See Comments) and Palpitations     Does not remember     Current Facility-Administered Medications   Medication Frequency    acetaminophen tablet 650 mg Q6H PRN    albuterol sulfate nebulizer solution 2.5 mg Q4H PRN    amiodarone tablet 400 mg BID    Followed by    [START ON 7/22/2024] amiodarone tablet 200 mg Daily    aspirin EC tablet 81 mg Daily    dextromethorphan-guaiFENesin  mg/5 ml liquid 5 mL Q4H PRN    dextrose 10% bolus 125 mL 125 mL PRN    dextrose 10% bolus 250 mL 250 mL PRN    gentamicin 0.1 % ointment TID    glucagon (human recombinant) injection 1 mg PRN    glucose chewable tablet 16 g PRN    glucose chewable tablet 24 g PRN    heparin 25,000 units in dextrose 5% (100 units/ml) IV bolus from bag LOW INTENSITY nomogram - [facility] PRN    heparin 25,000 units in dextrose 5% (100 units/ml) IV bolus from bag LOW INTENSITY nomogram - [facility] PRN    heparin 25,000 units in dextrose 5% 250 mL (100 units/mL) infusion LOW INTENSITY nomogram - [facility] Continuous    metoprolol tartrate (LOPRESSOR) tablet 25 mg BID    mupirocin 2 % ointment BID    naloxone 0.4 mg/mL injection 0.02 mg PRN    ondansetron injection 4 mg Q6H PRN    sacubitriL-valsartan 24-26 mg per tablet 1  tablet BID    simethicone chewable tablet 80 mg TID PRN    sodium chloride 0.9% flush 10 mL PRN       Objective:     Vital Signs (Most Recent):  Temp: 98 °F (36.7 °C) (07/14/24 0729)  Pulse: 107 (07/14/24 0729)  Resp: 18 (07/14/24 0729)  BP: (!) 120/58 (07/14/24 0729)  SpO2: 96 % (07/14/24 0729) Vital Signs (24h Range):  Temp:  [97.1 °F (36.2 °C)-99 °F (37.2 °C)] 98 °F (36.7 °C)  Pulse:  [] 107  Resp:  [18-23] 18  SpO2:  [93 %-99 %] 96 %  BP: ()/(56-79) 120/58     Weight: 128.5 kg (283 lb 4.7 oz) (07/08/24 2000)  Body mass index is 44.37 kg/m².  Body surface area is 2.46 meters squared.    I/O last 3 completed shifts:  In: 188.2 [I.V.:161.7; IV Piggyback:26.5]  Out: 343 [Other:343]     Physical Exam  Constitutional:       General: She is not in acute distress.     Appearance: Normal appearance. She is obese.   HENT:      Head: Normocephalic and atraumatic.      Mouth/Throat:      Mouth: Mucous membranes are moist.      Pharynx: Oropharynx is clear.   Cardiovascular:      Rate and Rhythm: Normal rate and regular rhythm.      Heart sounds: Normal heart sounds.   Pulmonary:      Effort: Pulmonary effort is normal.      Breath sounds: Normal breath sounds.   Abdominal:      Palpations: Abdomen is soft.   Musculoskeletal:      Right lower leg: Edema (2+) present.      Left lower leg: Edema (2+) present.   Skin:     General: Skin is warm and dry.   Neurological:      General: No focal deficit present.      Mental Status: She is alert. She is disoriented.      Motor: Weakness present.          Significant Labs:  All labs within the past 24 hours have been reviewed.     Significant Imaging:  All imaging with the past 24 hours have been reviewed.  Assessment/Plan:     Cardiac/Vascular  Acute on chronic combined systolic and diastolic heart failure  Per primary    Renal/  * ESRD (end stage renal disease)  Nephrology consulted for inpatient-HD management  admitted as a transfer for acute HFrEF in setting of CKD5  and recent PEA arrest during dialysis. Recent PD catheter placement on 6/4 but concerned for a possibly leak from cath. Unable to initiate dialysis for 2 weeks PTA to OSH due to insurance issues. Hospital course complicated by infected tunneled cath removed on 6/30. Transferred here for higher level of care. Initial plan at OSH was to replace tunneled catheter on the left side due to concerns to that right side may not by suitable. PD was initiated here in hospital however we were unable to remove significant amounts of fluid, At this time the daughter wishes to proceed with HD.       07/13  Tunnel cath placed 7/11  HD done last night, clotting issue with HD, Cath flow,TPA, was placed for 4 hours in both ports. The patient had a dialysis session afterwards with good blood flow of 400ml/min.       Plan:    On exam looks edematous, plan for HD today UF 3 L if able to tolerate it  Per interventional nephrology recommendation -will need 5000IU/ml Heparin lock in each port with each dialysis session.   - use 1500 IU Heparin with the priming of the machine   - Renally adjust medications  - Pre and Post-HD weights   - Continue to monitor intake and output      Anemia of ESRD   Recent Labs   Lab 07/12/24  0403 07/13/24  0342 07/14/24  0512   WBC 11.08 12.36 17.77*   HGB 10.1* 10.3* 9.9*   HCT 31.4* 32.9* 32.3*    125* 145*       Lab Results   Component Value Date    FESATURATED 10 (L) 01/22/2024    FERRITIN 141 01/22/2024       - Goal in ESRD is Hgb of 10-11.     Mineral Bone Disease in ESRD   Lab Results   Component Value Date    .7 (H) 05/08/2024    CALCIUM 8.2 (L) 07/14/2024    ALBUMIN 1.9 (L) 07/14/2024    CAION 1.36 04/14/2021    PHOS 5.6 (H) 07/14/2024            Thank you for your consult. I will follow-up with patient. Please contact us if you have any additional questions.    Spencer Frank MD  Nephrology  [facility] - [facility]    ATTENDING PHYSICIAN ATTESTATION  I have personally verified the  history and examined the patient. I thoroughly reviewed the demographic, clinical, laboratorial and imaging information available in medical records. I agree with the assessment and recommendations provided by the subspecialty resident who was under my supervision.

## 2024-07-14 NOTE — NURSING
Mastol, quick clot,surgicel, and gauze applied to bleeding neck area per verbal MD order. Tegerderm applied.

## 2024-07-14 NOTE — SUBJECTIVE & OBJECTIVE
Interval History:   On exam looks edematous, plan for HD today UF 3 L if able to tolerate it  Review of patient's allergies indicates:   Allergen Reactions    Percocet [oxycodone-acetaminophen] Itching    Amiodarone analogues      Itching      Januvia [sitagliptin]     Jardiance [empagliflozin]      Leg cramps    Lipitor [atorvastatin] Other (See Comments)     Severe leg pain    Linaclotide Other (See Comments) and Nausea And Vomiting     Does not remember    Lubiprostone Other (See Comments) and Palpitations     Does not remember     Current Facility-Administered Medications   Medication Frequency    acetaminophen tablet 650 mg Q6H PRN    albuterol sulfate nebulizer solution 2.5 mg Q4H PRN    amiodarone tablet 400 mg BID    Followed by    [START ON 7/22/2024] amiodarone tablet 200 mg Daily    aspirin EC tablet 81 mg Daily    dextromethorphan-guaiFENesin  mg/5 ml liquid 5 mL Q4H PRN    dextrose 10% bolus 125 mL 125 mL PRN    dextrose 10% bolus 250 mL 250 mL PRN    gentamicin 0.1 % ointment TID    glucagon (human recombinant) injection 1 mg PRN    glucose chewable tablet 16 g PRN    glucose chewable tablet 24 g PRN    heparin 25,000 units in dextrose 5% (100 units/ml) IV bolus from bag LOW INTENSITY nomogram - [facility] PRN    heparin 25,000 units in dextrose 5% (100 units/ml) IV bolus from bag LOW INTENSITY nomogram - [facility] PRN    heparin 25,000 units in dextrose 5% 250 mL (100 units/mL) infusion LOW INTENSITY nomogram - [facility] Continuous    metoprolol tartrate (LOPRESSOR) tablet 25 mg BID    mupirocin 2 % ointment BID    naloxone 0.4 mg/mL injection 0.02 mg PRN    ondansetron injection 4 mg Q6H PRN    sacubitriL-valsartan 24-26 mg per tablet 1 tablet BID    simethicone chewable tablet 80 mg TID PRN    sodium chloride 0.9% flush 10 mL PRN       Objective:     Vital Signs (Most Recent):  Temp: 98 °F (36.7 °C) (07/14/24 0729)  Pulse: 107 (07/14/24 0729)  Resp: 18 (07/14/24 0729)  BP: (!) 120/58 (07/14/24 0729)  SpO2: 96 %  (07/14/24 0729) Vital Signs (24h Range):  Temp:  [97.1 °F (36.2 °C)-99 °F (37.2 °C)] 98 °F (36.7 °C)  Pulse:  [] 107  Resp:  [18-23] 18  SpO2:  [93 %-99 %] 96 %  BP: ()/(56-79) 120/58     Weight: 128.5 kg (283 lb 4.7 oz) (07/08/24 2000)  Body mass index is 44.37 kg/m².  Body surface area is 2.46 meters squared.    I/O last 3 completed shifts:  In: 188.2 [I.V.:161.7; IV Piggyback:26.5]  Out: 343 [Other:343]     Physical Exam  Constitutional:       General: She is not in acute distress.     Appearance: Normal appearance. She is obese.   HENT:      Head: Normocephalic and atraumatic.      Mouth/Throat:      Mouth: Mucous membranes are moist.      Pharynx: Oropharynx is clear.   Cardiovascular:      Rate and Rhythm: Normal rate and regular rhythm.      Heart sounds: Normal heart sounds.   Pulmonary:      Effort: Pulmonary effort is normal.      Breath sounds: Normal breath sounds.   Abdominal:      Palpations: Abdomen is soft.   Musculoskeletal:      Right lower leg: Edema (2+) present.      Left lower leg: Edema (2+) present.   Skin:     General: Skin is warm and dry.   Neurological:      General: No focal deficit present.      Mental Status: She is alert. She is disoriented.      Motor: Weakness present.          Significant Labs:  All labs within the past 24 hours have been reviewed.     Significant Imaging:  All imaging with the past 24 hours have been reviewed.

## 2024-07-15 ENCOUNTER — ANESTHESIA (OUTPATIENT)
Dept: SURGERY | Facility: HOSPITAL | Age: 74
End: 2024-07-15
Payer: MEDICARE

## 2024-07-15 PROBLEM — Z99.11 ON MECHANICALLY ASSISTED VENTILATION: Status: ACTIVE | Noted: 2024-07-15

## 2024-07-15 PROCEDURE — 63600175 PHARM REV CODE 636 W HCPCS: Performed by: NURSE ANESTHETIST, CERTIFIED REGISTERED

## 2024-07-15 PROCEDURE — 25000003 PHARM REV CODE 250: Performed by: NURSE ANESTHETIST, CERTIFIED REGISTERED

## 2024-07-15 PROCEDURE — 63600175 PHARM REV CODE 636 W HCPCS: Performed by: STUDENT IN AN ORGANIZED HEALTH CARE EDUCATION/TRAINING PROGRAM

## 2024-07-15 RX ORDER — MIDAZOLAM HYDROCHLORIDE 1 MG/ML
INJECTION INTRAMUSCULAR; INTRAVENOUS
Status: DISCONTINUED | OUTPATIENT
Start: 2024-07-15 | End: 2024-07-15

## 2024-07-15 RX ORDER — PROPOFOL 10 MG/ML
INJECTION, EMULSION INTRAVENOUS CONTINUOUS PRN
Status: DISCONTINUED | OUTPATIENT
Start: 2024-07-15 | End: 2024-07-15

## 2024-07-15 RX ORDER — SODIUM CHLORIDE, SODIUM LACTATE, POTASSIUM CHLORIDE, CALCIUM CHLORIDE 600; 310; 30; 20 MG/100ML; MG/100ML; MG/100ML; MG/100ML
INJECTION, SOLUTION INTRAVENOUS CONTINUOUS PRN
Status: DISCONTINUED | OUTPATIENT
Start: 2024-07-15 | End: 2024-07-15

## 2024-07-15 RX ORDER — CEFAZOLIN 2 G/1
INJECTION, POWDER, FOR SOLUTION INTRAMUSCULAR; INTRAVENOUS
Status: DISCONTINUED | OUTPATIENT
Start: 2024-07-15 | End: 2024-07-15

## 2024-07-15 RX ORDER — SUCCINYLCHOLINE CHLORIDE 20 MG/ML
INJECTION INTRAMUSCULAR; INTRAVENOUS
Status: DISCONTINUED | OUTPATIENT
Start: 2024-07-15 | End: 2024-07-15

## 2024-07-15 RX ORDER — DEXAMETHASONE SODIUM PHOSPHATE 4 MG/ML
INJECTION, SOLUTION INTRA-ARTICULAR; INTRALESIONAL; INTRAMUSCULAR; INTRAVENOUS; SOFT TISSUE
Status: DISCONTINUED | OUTPATIENT
Start: 2024-07-15 | End: 2024-07-15

## 2024-07-15 RX ORDER — PHENYLEPHRINE HCL IN 0.9% NACL 1 MG/10 ML
SYRINGE (ML) INTRAVENOUS
Status: DISCONTINUED | OUTPATIENT
Start: 2024-07-15 | End: 2024-07-15

## 2024-07-15 RX ORDER — KETAMINE HCL IN 0.9 % NACL 50 MG/5 ML
SYRINGE (ML) INTRAVENOUS
Status: DISCONTINUED | OUTPATIENT
Start: 2024-07-15 | End: 2024-07-15

## 2024-07-15 RX ADMIN — PROPOFOL 50 MCG/KG/MIN: 10 INJECTION, EMULSION INTRAVENOUS at 11:07

## 2024-07-15 RX ADMIN — Medication 100 MCG: at 11:07

## 2024-07-15 RX ADMIN — ONDANSETRON 4 MG: 2 INJECTION INTRAMUSCULAR; INTRAVENOUS at 12:07

## 2024-07-15 RX ADMIN — CEFAZOLIN 3 EACH: 330 INJECTION, POWDER, FOR SOLUTION INTRAMUSCULAR; INTRAVENOUS at 11:07

## 2024-07-15 RX ADMIN — SODIUM CHLORIDE, SODIUM LACTATE, POTASSIUM CHLORIDE, AND CALCIUM CHLORIDE: 600; 310; 30; 20 INJECTION, SOLUTION INTRAVENOUS at 11:07

## 2024-07-15 RX ADMIN — Medication 200 MCG: at 12:07

## 2024-07-15 RX ADMIN — MIDAZOLAM HYDROCHLORIDE 2 MG: 1 INJECTION, SOLUTION INTRAMUSCULAR; INTRAVENOUS at 11:07

## 2024-07-15 RX ADMIN — NOREPINEPHRINE BITARTRATE 0.04 MCG/KG/MIN: 1 INJECTION, SOLUTION, CONCENTRATE INTRAVENOUS at 12:07

## 2024-07-15 RX ADMIN — Medication 20 MG: at 11:07

## 2024-07-15 RX ADMIN — SUCCINYLCHOLINE CHLORIDE 120 MG: 20 INJECTION, SOLUTION INTRAMUSCULAR; INTRAVENOUS; PARENTERAL at 12:07

## 2024-07-15 RX ADMIN — DEXAMETHASONE SODIUM PHOSPHATE 4 MG: 4 INJECTION, SOLUTION INTRAMUSCULAR; INTRAVENOUS at 12:07

## 2024-07-15 NOTE — SUBJECTIVE & OBJECTIVE
Interval History/Significant Events: No acute events overnight. TO OR today for PD catheter removal. Tolerated HD.     Review of Systems   Unable to perform ROS: Age     Objective:     Vital Signs (Most Recent):  Temp: 97.9 °F (36.6 °C) (07/15/24 1006)  Pulse: 104 (07/15/24 1006)  Resp: 20 (07/15/24 1006)  BP: 106/67 (07/15/24 1006)  SpO2: 95 % (07/15/24 1006) Vital Signs (24h Range):  Temp:  [96.8 °F (36 °C)-99.4 °F (37.4 °C)] 97.9 °F (36.6 °C)  Pulse:  [] 104  Resp:  [17-33] 20  SpO2:  [88 %-97 %] 95 %  BP: ()/(50-85) 106/67   Weight: 128.5 kg (283 lb 4.7 oz)  Body mass index is 44.37 kg/m².      Intake/Output Summary (Last 24 hours) at 7/15/2024 1102  Last data filed at 7/15/2024 0601  Gross per 24 hour   Intake 813.04 ml   Output 2700 ml   Net -1886.96 ml          Physical Exam  Vitals and nursing note reviewed.   Constitutional:       General: She is awake.      Appearance: She is ill-appearing.   HENT:      Mouth/Throat:      Mouth: Mucous membranes are moist.      Pharynx: Oropharynx is clear.   Eyes:      Pupils: Pupils are equal, round, and reactive to light.   Cardiovascular:      Rate and Rhythm: Tachycardia present. Rhythm irregular.      Comments: afib  Pulmonary:      Effort: Pulmonary effort is normal.      Breath sounds: Normal breath sounds.   Chest:      Comments: Tunneled HD cath  Abdominal:      General: There is distension.      Palpations: Abdomen is soft.      Comments: PD cath   Musculoskeletal:         General: Swelling present. Normal range of motion.      Right lower leg: Edema present.      Left lower leg: Edema present.   Skin:     General: Skin is warm and dry.      Capillary Refill: Capillary refill takes less than 2 seconds.   Neurological:      General: No focal deficit present.      Mental Status: She is alert and oriented to person, place, and time. Mental status is at baseline.      GCS: GCS eye subscore is 4. GCS verbal subscore is 5. GCS motor subscore is 6.    Psychiatric:         Attention and Perception: Attention normal.         Mood and Affect: Mood normal.         Speech: Speech normal.         Behavior: Behavior normal. Behavior is cooperative.         Thought Content: Thought content normal.         Judgment: Judgment normal.            Vents:  Oxygen Concentration (%): 28 (07/05/24 2320)  Lines/Drains/Airways       Central Venous Catheter Line  Duration                  Hemodialysis Catheter 07/11/24 1219 left subclavian 3 days              Peripheral Intravenous Line  Duration                  Midline Catheter - Single Lumen 07/12/24 0430 Right basilic vein (medial side of arm) 3 days         Peripheral IV - Single Lumen 07/12/24 1700 20 G 1 3/4 in Anterior;Distal;Right Forearm 2 days                  Significant Labs:    CBC/Anemia Profile:  Recent Labs   Lab 07/14/24  0512 07/15/24  0327   WBC 17.77* 19.38*   HGB 9.9* 10.1*   HCT 32.3* 31.5*   * 151   MCV 85 81*   RDW 21.7* 21.2*        Chemistries:  Recent Labs   Lab 07/14/24  0512 07/15/24  0327   * 132*   K 4.3 4.3    101   CO2 22* 20*   BUN 45* 36*   CREATININE 5.7* 4.7*   CALCIUM 8.2* 8.1*   ALBUMIN 1.9* 1.8*   PROT 4.9* 4.8*   BILITOT 0.9 0.9   ALKPHOS 156* 133   ALT 43 32   AST 30 22   MG 2.2 2.1   PHOS 5.6* 4.7*       All pertinent labs within the past 24 hours have been reviewed.    Significant Imaging:  I have reviewed all pertinent imaging results/findings within the past 24 hours.

## 2024-07-15 NOTE — PROGRESS NOTES
[facility] (Formerly [facility])  Critical Care Medicine  Progress Note    Patient Name: Juhi Taylor  MRN: [id number]  Admission Date: 7/3/2024  Hospital Length of Stay: 12 days  Code Status: Full Code  Attending Provider: Herb Coreas MD  Primary Care Provider: Tony Sadler MD   Principal Problem: ESRD (end stage renal disease)    Subjective:     HPI:  Juhi Taylor is a 74-year-old female with a past medical history of CKD stage 5 with recent PD catheter placement on 6/4, RCC s/p right nephrectomy, T2DM, obesity, CAD s/p PCI to Lcx and LAD in 11/2020 and HFmREF who is admitted as a transfer from [facility] for higher level of care and further cardiac evaluation. Nephrology consulted for hemodialysis. She initially presented on 6/18/24 to [facility] for hypervolemia in the setting of CKD 4-5 for which she had undergone elective PD catheter placement complicated by catheter site leaking. She had a complex hospital course, was initially placed on furosemide gtt without significant improvement and later underwent placement of tunneled line for HD with volume removal. During her second dialysis session she went into PEA arrest for which she was resuscitated, intubated and transferred to ICU. She was noted to have elevated troponin and subsequent TTE found reduced EF from 40-45% to 20-25% for which coronary angiography was recommended. Her hospital course was also complicated by dialysis line infection, significant bleeding around the catheter site, bradycardia and subsequent atrial fibrillation with RVR. Given the catheter site bleeding, anticoagulation was temporarily held. She has reported allergy to amiodarone, was initially placed on diltiazem for atrial fibrillation despite reduced EF. She discussed GOC at outside hospital where it appears that she lacked capacity per note and decided to pursue higher level of care at [facility]. At that time, gen surgery was planning to place a left tunneled catheter due to  concerns of the right side not being suitable     Upon admit, patient appeared drowsy but conversant. Daughter reports that she typically gets lethargic 2/2 to the volume overload due to not receiving dialysis. Patient reported b/l left lower extremity pain. Daughter concerned about her not having dialysis in 4 days and wants a session today.     Pt initially refusing dialysis. Seen by nephrology. VBG 7.220/33.4/34/13.7/-14. Critical care medicine consulted for emergent trialysis line placement and dialysis.     Hospital/ICU Course:  Ms. Taylor was admitted to MICU on 7/8 for urgent dialysis line placement and initiated of HD. Patient received short run of CRRT overnight without issue. Encephalopathy improving with continued CRRT treatments. Persistent AFib with RVR, received Digoxin loading dose (renally dosed), restarted PO Carvedilol. Patient remains in persistent AFib RVR despite therapeutic digoxin level and uptitration of Carvedilol levels, started on Amiodarone infusion overnight. Transitioned to amio PO, started on heparin gtt for afib AC. Tolerated HD well overnight without complications and less encephalopathic upon exam. Planning for gen surg to remove PD catheter. Stable at this time for stepdown with hospital medicine. Completed HD overnight.      Interval History/Significant Events: Pt to OR for PD catheter removal. Intubated after vomiting and aspiration. PD catheter removed successfully. Returned to ICU intubated.     Review of Systems   Unable to perform ROS: Intubated     Objective:     Vital Signs (Most Recent):  Temp: 97.9 °F (36.6 °C) (07/15/24 1006)  Pulse: 104 (07/15/24 1006)  Resp: 20 (07/15/24 1006)  BP: 106/67 (07/15/24 1006)  SpO2: 95 % (07/15/24 1006) Vital Signs (24h Range):  Temp:  [97.5 °F (36.4 °C)-99.4 °F (37.4 °C)] 97.9 °F (36.6 °C)  Pulse:  [] 104  Resp:  [17-33] 20  SpO2:  [88 %-97 %] 95 %  BP: ()/(50-85) 106/67   Weight: 128.5 kg (283 lb 4.7 oz)  Body mass index is  44.37 kg/m².      Intake/Output Summary (Last 24 hours) at 7/15/2024 1323  Last data filed at 7/15/2024 0601  Gross per 24 hour   Intake 791.14 ml   Output 2700 ml   Net -1908.86 ml          Physical Exam  Vitals and nursing note reviewed.   Constitutional:       Appearance: She is obese. She is ill-appearing.   Eyes:      Pupils: Pupils are equal, round, and reactive to light.   Cardiovascular:      Rate and Rhythm: Normal rate and regular rhythm.      Pulses: Normal pulses.      Heart sounds: Normal heart sounds.   Pulmonary:      Comments: Intubated on mechanical ventilation  Abdominal:      General: Bowel sounds are normal.      Palpations: Abdomen is soft.   Musculoskeletal:         General: Normal range of motion.   Skin:     General: Skin is warm and dry.   Neurological:      Comments: Sedated            Vents:  Oxygen Concentration (%): 28 (07/05/24 2320)  Lines/Drains/Airways       Central Venous Catheter Line  Duration                  Hemodialysis Catheter 07/11/24 1219 left subclavian 4 days              Airway  Duration                  Airway - Non-Surgical 07/15/24 1216 <1 day              Peripheral Intravenous Line  Duration                  Midline Catheter - Single Lumen 07/12/24 0430 Right basilic vein (medial side of arm) 3 days         Peripheral IV - Single Lumen 07/12/24 1700 20 G 1 3/4 in Anterior;Distal;Right Forearm 2 days                  Significant Labs:    CBC/Anemia Profile:  Recent Labs   Lab 07/14/24  0512 07/15/24  0327   WBC 17.77* 19.38*   HGB 9.9* 10.1*   HCT 32.3* 31.5*   * 151   MCV 85 81*   RDW 21.7* 21.2*        Chemistries:  Recent Labs   Lab 07/14/24  0512 07/15/24  0327   * 132*   K 4.3 4.3    101   CO2 22* 20*   BUN 45* 36*   CREATININE 5.7* 4.7*   CALCIUM 8.2* 8.1*   ALBUMIN 1.9* 1.8*   PROT 4.9* 4.8*   BILITOT 0.9 0.9   ALKPHOS 156* 133   ALT 43 32   AST 30 22   MG 2.2 2.1   PHOS 5.6* 4.7*       All pertinent labs within the past 24 hours have been  reviewed.    Significant Imaging:  I have reviewed all pertinent imaging results/findings within the past 24 hours.    ABG  Recent Labs   Lab 07/09/24  0904   PH 7.281*   PO2 42   PCO2 47.0*   HCO3 22.1*   BE -5*     Assessment/Plan:     Pulmonary  On mechanically assisted ventilation  On mechanical ventilation after vomiting copious amounts of bile in the OR.     --Lung protective ventilation  --Daily SAT/SBT  --Sedation for RASS -1      Cardiac/Vascular  Atrial fibrillation with RVR  AFib with RVR appears new following cardiac arrest at OSH. Per chart review, AFib was difficult to control. Patient endorses amiodarone reaction with itching, rash, and oral burning. Refused Sotalol, DCCV, and AICD placement at OSH. Was placed on Diltizem infusion and transitioned to PO metoprolol. Amio gtt restarted overnight 7/11 s/t uncontrolled afib rvr. Transitioned to PO amio.    - Heparin gtt initiated for AC  - Daily CMP  - Keep K>4, Mag >2    Acute on chronic combined systolic and diastolic heart failure  Echo    Left Ventricle: The left ventricle is moderately dilated. Normal wall thickness. Global hypokinesis present. There is severely reduced systolic function with a visually estimated ejection fraction of 20 - 25%.    Right Ventricle: Mild right ventricular enlargement. Wall thickness is normal. Right ventricle wall motion has global hypokinesis. Systolic function is moderately reduced.    Left Atrium: Left atrium is severely dilated.    Right Atrium: Right atrium is severely dilated.    Aortic Valve: The aortic valve is a trileaflet valve. There is mild aortic valve sclerosis.    Mitral Valve: There is mild regurgitation.    Tricuspid Valve: There is severe regurgitation.    Pulmonary Artery: There is moderate pulmonary hypertension. The estimated pulmonary artery systolic pressure is 61 mmHg.    IVC/SVC: Elevated venous pressure at 15 mmHg.    Pericardium: There is a trivial circumferential effusion. No indication of  cardiac tamponade.    - Will require interventional cardiology for LHC vs PET for reduced EF once euvolemic  - LifeVest prior to discharge  - Starting Entresto 24-26mg BID per cardiology recs; discussed with patient regarding allergy to irbesartan and stated does not recall any problems with taking it    Essential hypertension  - Changed home Carvedilol to Metoprolol 25mg BID per cardiology recs    Renal/  * ESRD (end stage renal disease)  Pt previously on PD. Unable to remove enough volume with PD catheter. Initially upgraded to MICU for emergent trialysis placement and CRRT. Now with tunneled dialysis catheter. Tolerated session of HD on 7/12 without complications.     - Nephrology following, appreciate recs  - Strict I/Os  - Renally dose meds  - Avoid nephrotoxic agents  - General surgery consulted to remove PD catheter today  - Tolerated HD session overnight 07/15    Endocrine  Type 2 diabetes mellitus with microalbuminuria, without long-term current use of insulin  Latest A1C 6.8; on Metformin at home    - POC glucose  - Hypoglycemia protocol    Palliative Care  Palliative care encounter  - Palliative care following, continued GOC with family    ACP (advance care planning)  - Palliative care consulted, appreciate their assistance in clarification of GOC/POC with patient and family        Critical Care Time: 35 minutes  Critical secondary to Patient has a condition that poses threat to life and bodily function: ESRD, Heart failure      Critical care was time spent personally by me on the following activities: development of treatment plan with patient or surrogate and bedside caregivers, discussions with consultants, evaluation of patient's response to treatment, examination of patient, ordering and performing treatments and interventions, ordering and review of laboratory studies, ordering and review of radiographic studies, pulse oximetry, re-evaluation of patient's condition. This critical care time did not  overlap with that of any other provider or involve time for any procedures.     Connie Mar, NIXON  Critical Care Medicine  [facility] - Surgery (2nd Fl)

## 2024-07-15 NOTE — TRANSFER OF CARE
Anesthesia Transfer of Care Note    Patient: Juhi Taylor    Procedure(s) Performed: Procedure(s) (LRB):  REMOVAL, CATHETER, DIALYSIS, PERITONEAL (N/A)  BRONCHOSCOPY, FLEXIBLE (N/A)    Patient location: PACU    Anesthesia Type: general    Transport from OR: Transported from OR on room air with adequate spontaneous ventilation    Post pain: adequate analgesia    Post assessment: no apparent anesthetic complications and tolerated procedure well    Post vital signs: stable    Level of consciousness: sedated and responds to stimulation    Nausea/Vomiting: no nausea/vomiting    Complications: none    Transfer of care protocol was followed      Last vitals: Visit Vitals  /67 (BP Location: Right leg, Patient  "Position: Lying)   Pulse (!) 112   Temp 36.6 °C (97.9 °F)   Resp 20   Ht 5' 7" (1.702 m)   Wt 128.5 kg (283 lb 4.7 oz)   SpO2 100%   Breastfeeding No   BMI 44.37 kg/m²     "

## 2024-07-15 NOTE — PLAN OF CARE
MICU DAILY GOALS     Family/Goals of care/Code Status   Code Status: Full Code    24H Vital Sign Range  Temp:  [96.8 °F (36 °C)-99.4 °F (37.4 °C)]   Pulse:  []   Resp:  [17-33]   BP: ()/(50-85)   SpO2:  [92 %-99 %]      Shift Events (include procedures and significant events)   VVS, finished HD. Gtts are heparin. NPO at midnight. No acute events throughout shift    AWAKE RASS: Goal -    Actual - RASS (Jha Agitation-Sedation Scale): drowsy    Restraint necessity: Not necessary   BREATHE SBT: Not intubated    Coordinate A & B, analgesics/sedatives Pain: managed   SAT: Not intubated   Delirium CAM-ICU: Overall CAM-ICU: Negative   Early(intubated/ Progressive (non-intubated) Mobility MOVE Screen (INTUBATED ONLY): Not intubated    Activity: Activity Management: Rolling - L1   Feeding/Nutrition Diet order: Diet/Nutrition Received: consistent carb/diabetic diet, Specialty Diet/Nutrition Received: renal diet   Thrombus DVT prophylaxis: VTE Required Core Measure: Pharmacological prophylaxis initiated/maintained   HOB Elevation Head of Bed (HOB) Positioning: HOB at 45 degrees   Ulcer Prophylaxis GI: yes   Glucose control managed Glycemic Management: blood glucose monitored   Skin Skin assessed during: Q Shift Change    Sacrum intact/not altered? Yes  Heels intact/not altered? Yes  Surgical wound? No    CHECK ONE!   (no altered skin or altered skin) and sub boxes:  [] No Altered Skin Integrity Present    []Prevention Measures Documented    [x] Altered Skin Integrity Present or Discovered   [x] LDA present in EPIC, daily doc completed              [] LDA added if not in EPIC (describe wound).                    When describing wound, do not stage, use descriptive words only.    [] Wound Image Taken (required on admit,                   transfer/discharge and every Tuesday)    Wound Care Consulted? No    4 EYES:  Attending Nurse (1st set of eyes):     Second RN/Staff Member (2nd set of eyes):    Bowel Function        Indwelling Catheter Necessity      [REMOVED] Trialysis (Dialysis) Catheter 07/08/24 2210 left internal jugular-Line Necessity Review: CRRT/HD       Hemodialysis Catheter 07/11/24 1219 left subclavian-Line Necessity Review: CRRT/HD     De-escalation Antibiotics Yes        VS and assessment per flow sheet, patient progressing towards goals as tolerated, plan of care reviewed with family, all concerns addressed, will continue to monitor.

## 2024-07-15 NOTE — PROGRESS NOTES
[facility]  Nephrology  Progress Note    Patient Name: Juhi Taylor  MRN: [id number]  Admission Date: 7/3/2024  Hospital Length of Stay: 12 days  Attending Provider: Herb Coreas MD   Primary Care Physician: Tony Sadler MD  Principal Problem:ESRD (end stage renal disease)    Subjective:     HPI: 74-year-old female with prior history of CKD stage 5 with recent PD catheter placement on 6/4, RCC s/p right nephrectomy, T2DM, obesity, CAD s/p PCI to Lcx and LAD in 11/2020 and HFmREF who is admitted as a transfer from [facility] for higher level of care and further cardiac evaluation. Nephrology consulted for hemodialysis.     She initially presented on 6/18/24 to [facility] for hypervolemia in the setting of CKD 4-5 for which she had undergone elective PD catheter placement complicated by catheter site leaking. She had a complex hospital course, was initially placed on furosemide gtt without significant improvement and later underwent placement of tunneled line for HD with volume removal. During her second dialysis session she went into PEA arrest for which she was resuscitated, intubated and transferred to ICU. She was noted to have elevated troponin and subsequent TTE found reduced EF from 40-45% to 20-25% for which coronary angiography was recommended. Her hospital course was also complicated by dialysis line infection, significant bleeding around the catheter site, bradycardia and subsequent atrial fibrillation with RVR. Given the catheter site bleeding, anticoagulation was temporarily held. She has reported allergy to amiodarone, was initially placed on diltiazem for atrial fibrillation despite reduced EF. She discussed GOC at outside hospital where it appears that she lacked capacity per note and decided to pursue higher level of care at [facility]. At that time, gen surgery was planning to place a left tunneled catheter due to concerns of the right side not being suitable    Upon admit,  patient appeared drowsy but conversant. Daughter reports that she typically gets lethargic 2/2 to the volume overload due to not receiving dialysis. Patient reported b/l left lower extremity pain. Daughter concerned about her not having dialysis in 4 days and wants a session today.     Interval History:  Juhi is laying in bed this morning, says that she feels well, mentation seems around her baseline. Denies shortness of breath or feeling edematous.     Review of patient's allergies indicates:   Allergen Reactions    Percocet [oxycodone-acetaminophen] Itching    Amiodarone analogues      Itching      Januvia [sitagliptin]     Jardiance [empagliflozin]      Leg cramps    Lipitor [atorvastatin] Other (See Comments)     Severe leg pain    Linaclotide Other (See Comments) and Nausea And Vomiting     Does not remember    Lubiprostone Other (See Comments) and Palpitations     Does not remember     Current Facility-Administered Medications   Medication Frequency    0.9%  NaCl infusion Once    acetaminophen tablet 650 mg Q6H PRN    albuterol sulfate nebulizer solution 2.5 mg Q4H PRN    amiodarone tablet 400 mg BID    Followed by    [START ON 7/22/2024] amiodarone tablet 200 mg Daily    aspirin EC tablet 81 mg Daily    dextromethorphan-guaiFENesin  mg/5 ml liquid 5 mL Q4H PRN    dextrose 10% bolus 125 mL 125 mL PRN    dextrose 10% bolus 250 mL 250 mL PRN    gentamicin 0.1 % ointment TID    glucagon (human recombinant) injection 1 mg PRN    glucose chewable tablet 16 g PRN    glucose chewable tablet 24 g PRN    heparin (porcine) injection 1,000 Units PRN    heparin 25,000 units in dextrose 5% (100 units/ml) IV bolus from bag LOW INTENSITY nomogram - [facility] PRN    heparin 25,000 units in dextrose 5% (100 units/ml) IV bolus from bag LOW INTENSITY nomogram - [facility] PRN    heparin 25,000 units in dextrose 5% 250 mL (100 units/mL) infusion LOW INTENSITY nomogram - [facility] Continuous    metoprolol tartrate (LOPRESSOR) tablet 25 mg  BID    midodrine tablet 10 mg Daily PRN    mupirocin 2 % ointment BID    naloxone 0.4 mg/mL injection 0.02 mg PRN    ondansetron injection 4 mg Q6H PRN    sacubitriL-valsartan 24-26 mg per tablet 1 tablet BID    simethicone chewable tablet 80 mg TID PRN    sodium chloride 0.9% flush 10 mL PRN       Objective:     Vital Signs (Most Recent):  Temp: 98.1 °F (36.7 °C) (07/15/24 0730)  Pulse: 103 (07/15/24 0749)  Resp: 20 (07/15/24 0749)  BP: 104/71 (07/15/24 0730)  SpO2: (!) 92 % (07/15/24 0749) Vital Signs (24h Range):  Temp:  [96.8 °F (36 °C)-99.4 °F (37.4 °C)] 98.1 °F (36.7 °C)  Pulse:  [] 103  Resp:  [17-33] 20  SpO2:  [88 %-97 %] 92 %  BP: ()/(50-85) 104/71     Weight: 128.5 kg (283 lb 4.7 oz) (07/08/24 2000)  Body mass index is 44.37 kg/m².  Body surface area is 2.46 meters squared.    I/O last 3 completed shifts:  In: 1043.4 [P.O.:200; I.V.:416.9; Other:400; IV Piggyback:26.5]  Out: 2700 [Other:2700]     Physical Exam  Constitutional:       General: She is not in acute distress.     Appearance: Normal appearance. She is obese.   HENT:      Head: Normocephalic and atraumatic.      Mouth/Throat:      Mouth: Mucous membranes are moist.      Pharynx: Oropharynx is clear.   Cardiovascular:      Rate and Rhythm: Normal rate and regular rhythm.      Heart sounds: Normal heart sounds.   Pulmonary:      Effort: Pulmonary effort is normal.      Breath sounds: Normal breath sounds.   Abdominal:      Palpations: Abdomen is soft.   Musculoskeletal:      Right lower leg: Edema (2+) present.      Left lower leg: Edema (2+) present.      Comments: Small amount of weeping in lower extremities.    Skin:     General: Skin is warm and dry.   Neurological:      General: No focal deficit present.      Mental Status: She is alert. She is disoriented.      Motor: Weakness present.          Significant Labs:  All labs within the past 24 hours have been reviewed.     Significant Imaging:  All imaging with the past 24 hours  have been reviewed.  Assessment/Plan:     Renal/  * ESRD (end stage renal disease)  Nephrology consulted for inpatient-HD management  admitted as a transfer for acute HFrEF in setting of CKD5 and recent PEA arrest during dialysis. Recent PD catheter placement on 6/4 but concerned for a possibly leak from cath. Unable to initiate dialysis for 2 weeks PTA to OSH due to insurance issues. Hospital course complicated by infected tunneled cath removed on 6/30. Transferred here for higher level of care. Initial plan at OSH was to replace tunneled catheter on the left side due to concerns to that right side may not by suitable. PD was initiated here in hospital however we were unable to remove significant amounts of fluid, At this time the daughter wishes to proceed with HD.     Plan/Recommendations  -HD TThS tentatively planned  -Tunnel cath placed 7/11, over the weekend had HD done and 2.7 L removed, clotting issue with HD, Cath flow,TPA.   -Per interventional nephrology recommendation will need 5000IU/ml Heparin lock in each port with each dialysis session.   - use 1500 IU Heparin with the priming of the machine   - Renally adjust medications  - Pre and Post-HD weights   - Continue to monitor intake and output           Thank you for your consult. I will follow-up with patient. Please contact us if you have any additional questions.    Paulo Alicea MD  Nephrology  [facility]

## 2024-07-15 NOTE — ASSESSMENT & PLAN NOTE
On mechanical ventilation after vomiting copious amounts of bile in the OR.     --Lung protective ventilation  --Daily SAT/SBT  --Sedation for RASS -1

## 2024-07-15 NOTE — PT/OT/SLP PROGRESS
Occupational Therapy      Patient Name:  Juhi Taylor   MRN:  [id number]    Patient not seen today secondary to Other (Comment) (Pt JORJE in [facility] during AM attempt; writing OT unable to return for additional attempt.). Will follow-up for OT re evaluation.    Sophie Lucia OT  7/15/2024

## 2024-07-15 NOTE — CONSULTS
" Palliative Medicine  Consult Note       Patient Name: Juhi Taylor   MRN: [id number]   Admission Date: 7/3/2024   Hospital Length of Stay: 12   Attending Provider: Kristin Alvarado MD   Consulting Provider: SEAMUS Gautam  Primary Care Physician: Tony Sadler MD   Principal Problem: ESRD (end stage renal disease)     Patient information was obtained from relative(s), past medical records, and ER records.           Assessment/Plan:    CHF with complicated cardiac history.  Patient does not want further intervention and wants to go home. Youngest daughter wants to continue treatments.  Will need sequential treatments in efforts to be "better" but no one is convinced that she has the functional status to get "better".  ESRD does not want HD catheter placed. Will ask Nephro if PD can be restarted.    Palliative Care Encounter:  Impression:  Elderly woman with multiple medical problems including end-stage renal disease, worsening heart failure as well as significant dysrhythmias.  Patient says that she wants to go home however it is unclear if she understands that going home means that she will have a limited life span.  In her current date I am not sure she has a candidate for either peritoneal or hemodialysis.    Palliative care consulted for goals of care and decision making.       Advance Care Planning   Advance Directives:   Living Will: No    Do Not Resuscitate Status: No    Medical Power of : No      Decision Making:  Family answered questions and Patient unable to communicate due to disease severity/cognitive impairment  Goals of Care: What is most important right now is to focus on extending life as long as possible, even it it means sacrificing quality. Accordingly, we have decided that the best plan to meet the patient's goals includes continuing with treatment.    7/15/24: Pt was slated to have stepdown orders and transition to floor today following PD catheter removal. However, " "aspiration event in OR for removal resulted in intubation. Upon my visit this afternoon pt is intubated on Levophed and propofol. Daughter, Maria Luisa, is at bs and agreeable to participate in coversation.   -Maria Luisa shares that her mother was very awake over the weekend and she encouraged her mother to share what she does/does not want for her medical care. Maria Luisa shares that her mother kept saying, "whatever the doctor's say to do" is fine with her.   -No specific GOC were recorded with pt over the weekend.   -Maria Luisa shares she feels conflicted b/c her mother had previously shared with her that she is tired and just wants to go home. Mother and daughter were supposed to discuss her GOC in more detail this afternoon but now pt is intubated.  -Discussed that Pal med will continue to follow, as pt is still very sick and although we hope to wean off pressors and extubate, there are no guarantees.   -Discussed DNR with Maria Luisa. She shares her mother always said "it's in God's hands" if she lives or not. Discussed that cardiac arrest is a natural death and some people view CPR as intervening on God's plan. She is agreeable to continuing conversations and hopes for her mother to wake up and make her own decisions on the matter.   -Discussed immediate goals for pt to be extubated, off pressors, and able to tolerate HD, in order to continue with plan of ltac/rehab with outpatient dialysis.  **Discussed that Pal Med will visit daily with goal of having difficult conversations with pt as soon as appropriate.       - Prior experience with serious illness: yes  -The patient has previously engaged in advance care planning or GOC discussions  - Insight/Understanding of illness: difficult decision making between family members.    Patient does not have a healthcare power of  but she has 3 of 4 living children.  Son Dawood and daughter Maria Luisa are in agreement however daughter francy is not willing to participate in the " conversation.  But no decisions have been made    Life Limiting Diagnosis:  ESHD and ESRD  -Prognosis-Time and potential for recovery: poor  -Functional status: poor.  Pt was not able to manage ADLs  -Dementia diagnosis no      Symptom Management:  -Pain: no  -Debility: pt has been bed bound throughout duration of hospitalization. She was walking some prior. Family is understanding that pt will need LTAC/Rehab upon d/c.     -Dyspnea no      -Anxiety/Depression no      -Constipation: no      -Anorexia:yes      Summary of recommendations and follow up plan:  -Most important goals at this time: Further discussion about care plans and what might be beneficial   -Code status: Full Code but did make a recommendation that her status change to allow natural death.  -Disposition: continue current level of care.      Thank you for your consult. I will follow-up with patient. Please contact us if you have any additional questions.       Subjective:     Chief Complaint: No chief complaint on file.        HPI:   Ms. Taylor is a 75yo female with a PMHx of CAD, ESRD on PD, HFrEF of 40%, and afib on eliquis who was transferred to [facility] from [facility] for a higher level of care. She originally presented to [facility] ED on 6/18 for worsening SOB and was found to be satting 89% on RA determined to be due to a heart failure exacerbation. She was given lasix gtt and metolazone but still produced insufficient urine. HD was started, but during a dialysis session on 6/24 she suffered PEA cardiac arrest, epi was administered and she converted to V tach. After cardioversion ROSC was achieved and she was intubated and transferred to the ICU. Repeat Echo revealed an EF of 20-25% from 40% on admission but cardiology did not intervene at this time to due to her clinical condition but recommended later evaluation for ICD and angiogram. Her condition improved with further HD. She was extubated and moved to the floor but developed an uptrending leukocytosis  possibly originating from a left trialysis catheter with bruising and possible purulent discharge. The line was removed. She was determined to not be a candidate for hospice. The family requested [place] [facility] [place] higher level of care.       Hospital Course:  Since arrival to [facility], patient has refused interventions.  This morning in fact she refused taking her medications.  I was asked to see her to help delineate goals of care and further treatments.  Her son and oldest daughter Maria Luisa feel that the patient is nearing the end of her life.  Other daughter francy does not want to hear anything about this.  She also does not think that her mother is capable of making any decisions.  Patient was pretty clear about going home and yesterday apparently understood the ramifications of stopping all of her current treatments.    Review of Symptoms      Symptom Assessment (ESAS 0-10 Scale)  Pain:  0  Dyspnea:  0  Anxiety:  0  Nausea:  0  Depression:  0  Anorexia:  0  Fatigue:  0  Insomnia:  0  Restlessness:  0  Agitation:  0  Unable to complete assessment due to Mental status change     CAM / Delirium:  Negative  Constipation:  Negative  Diarrhea:  Negative      Bowel Management Plan (BMP):  Yes      Pain Assessment:  OME in 24 hours:  0  Location(s):      Pain Assessment in Advanced Demential Scale (PAINAD)   Breathing - Independent of vocalization:  0  Negative vocalization:  0  Facial expression:  0  Body language:  0  Consolability:  0  Total:  0    Modified Chago Scale:  0    Performance Status:  40    Living Arrangements:  Lives with family, Lives in home and Lives >50 miles from facility    Psychosocial/Cultural:   See Palliative Psychosocial Note: No  , 3 living children.    of pancreatic cancer with hospice. Son  with hospice as well.  Bam Camejo lives with patient, daughter Criss and son Dawood /wife supportive. They do not want mom to suffer.  **Primary  to  Follow**  Palliative Care  Consult: Yes    Spiritual:  F - Fely and Belief:  Non Church  I - Importance:  Yes  C - Community:  Unknown  A - Address in Care:  Unknown           ROS:  Review of Systems   Constitutional:  Positive for activity change, appetite change and fatigue.   Respiratory:  Positive for shortness of breath.    Cardiovascular:  Negative for chest pain.   Neurological:  Positive for weakness.         Past Medical History:   Diagnosis Date    Anticoagulant long-term use     Arthritis     Breast cancer 2014    invasive lobular carcinoma    Cancer of kidney 11/2020    RIGHT KIDNEY CANCER    CHF (congestive heart failure)     Coronary artery disease dx 2005    Depression     Diabetes mellitus     Diastolic heart failure secondary to hypertension     Gout     Hyperlipemia     Hypertension     Hypertrophy of nasal turbinates     Kidney mass 2020    Right    Levoscoliosis     Lung nodule     left    Multiple thyroid nodules     NICOLE (obstructive sleep apnea)     uses C-PAP    Pulmonary hypertension     Severe sepsis 07/01/2024     Past Surgical History:   Procedure Laterality Date    AORTOGRAPHY N/A 12/04/2020    Procedure: Aortogram;  Surgeon: Paul Pedersen MD;  Location: [facility] CATH;  Service: Cardiology;  Laterality: N/A;    BREAST SURGERY      CARDIAC CATHETERIZATION  12/2020    CHOLECYSTECTOMY      COLONOSCOPY      multi -last 2014     CORONARY ARTERY BYPASS GRAFT      ESOPHAGOGASTRODUODENOSCOPY      2012     HAND SURGERY Right     INSERTION OF CATHETER N/A 6/23/2024    Procedure: Insertion,catheter;  Surgeon: Meli Griffin MD;  Location: [facility] OR;  Service: Vascular;  Laterality: N/A;  ORIGINAL CATHETER WAS REPOSITIONED.  NO NEW CATHETER WAS PLACED    INSERTION OF TUNNELED CENTRAL VENOUS HEMODIALYSIS CATHETER Right 7/11/2024    Procedure: Insertion, Catheter, Central Venous, Hemodialysis;  Surgeon: Hawa Landa MD;  Location: [facility] CATH LAB;  Service: Interventional  "Nephrology;  Laterality: Right;    INSERTION, CATHETER, DIALYSIS, PERITONEAL N/A 6/4/2024    Procedure: IN Insertion Catheter, Dialysis, Peritoneal - laparoscopic;  Surgeon: Rodriguez Catalan Jr., MD;  Location: [facility] OR;  Service: General;  Laterality: N/A;    LAPAROSCOPIC ROBOT-ASSISTED SURGICAL REMOVAL OF KIDNEY USING DA CHELLE XI Right 03/10/2022    Procedure: XI ROBOTIC NEPHRECTOMY- radical;  Surgeon: Rolnado Ramirez MD;  Location: [facility] OR;  Service: Urology;  Laterality: Right;    MASTECTOMY W/ SENTINEL NODE BIOPSY Bilateral 01/21/2015    bilateral "dog ears"    NASAL SINUS SURGERY  2015    Dr Bryant FESS/cauterization turbinate     PARTIAL HYSTERECTOMY  1989    PERCUTANEOUS TRANSLUMINAL BALLOON ANGIOPLASTY OF CORONARY ARTERY  12/04/2020    Procedure: Angioplasty-coronary;  Surgeon: Paul Pedersen MD;  Location: [facility] CATH;  Service: Cardiology;;    REMOVAL OF HEMODIALYSIS CATHETER  7/11/2024    Procedure: REMOVAL, CATHETER, HEMODIALYSIS;  Surgeon: Hawa Landa MD;  Location: [facility] CATH LAB;  Service: Interventional Nephrology;;    RENAL BIOPSY Right     9/20/2021 EJ    TUBAL LIGATION      ULTRASOUND GUIDANCE  12/04/2020    Procedure: Ultrasound Guidance;  Surgeon: Paul Pedersen MD;  Location: [facility] CATH;  Service: Cardiology;;     Family History   Problem Relation Name Age of Onset    Breast cancer Mother      Stroke Father Waqar Sr.     Hypertension Father Waqar Sr.     Hepatitis Brother      Asthma Daughter Nell Taylor     Birth defects Daughter Nell Camejo's two children has cleft lips    Depression Daughter Nell Taylor     Drug abuse Daughter Nell Taylor     Learning disabilities Daughter Nell Taylor     Mental illness Daughter Nell Taylor     Breast cancer Maternal Aunt      Glaucoma Sister      Drug abuse Daughter Nell     Macular degeneration Neg Hx      Retinal detachment Neg Hx           Review of patient's allergies indicates:   Allergen Reactions    " Percocet [oxycodone-acetaminophen] Itching    Januvia [sitagliptin]     Jardiance [empagliflozin]      Leg cramps    Lipitor [atorvastatin] Other (See Comments)     Severe leg pain    Linaclotide Other (See Comments) and Nausea And Vomiting     Does not remember    Lubiprostone Other (See Comments) and Palpitations     Does not remember       Medications:    Current Facility-Administered Medications:     0.9%  NaCl infusion, , Intravenous, Once, Spencer Frank MD, Held at 07/14/24 1500    acetaminophen tablet 650 mg, 650 mg, Oral, Q6H PRN, Zakia Miranda, DO, 650 mg at 07/05/24 1550    albuterol sulfate nebulizer solution 2.5 mg, 2.5 mg, Nebulization, Q4H PRN, Zakia Miranda, DO    amiodarone tablet 400 mg, 400 mg, Oral, BID, 400 mg at 07/15/24 1022 **FOLLOWED BY** [START ON 7/22/2024] amiodarone tablet 200 mg, 200 mg, Oral, Daily, Medina Clayton, [facility] Center[facility]NP-BC    aspirin EC tablet 81 mg, 81 mg, Oral, Daily, Zakia Miranda, , 81 mg at 07/14/24 0912    dextromethorphan-guaiFENesin  mg/5 ml liquid 5 mL, 5 mL, Oral, Q4H PRN, Zakia Miranda, DO, 5 mL at 07/03/24 1302    dextrose 10% bolus 125 mL 125 mL, 12.5 g, Intravenous, PRN, Jose Roberto Pruett Jr., MD    dextrose 10% bolus 250 mL 250 mL, 25 g, Intravenous, PRN, Jose Roberto Pruett Jr., MD    famotidine (PF) injection 20 mg, 20 mg, Intravenous, Daily, Gloria Coker PA-C, 20 mg at 07/15/24 1458    gentamicin 0.1 % ointment, , Topical (Top), TID, Jon Hernandez MD, Given at 07/14/24 2100    glucagon (human recombinant) injection 1 mg, 1 mg, Intramuscular, PRN, Zakia Miranda, DO    glucose chewable tablet 16 g, 16 g, Oral, PRN, Zakia Miranda, DO    glucose chewable tablet 24 g, 24 g, Oral, PRN, Zakia Miranda DO    heparin (porcine) injection 1,000 Units, 1,000 Units, Intra-Catheter, PRN, Spencer Frank MD    heparin (porcine) injection 5,000 Units, 5,000 Units, Intra-Catheter, PRN, Paulo Alicea MD    heparin 25,000 units in dextrose 5% (100 units/ml) IV  bolus from bag LOW INTENSITY nomogram - [facility], 60 Units/kg (Adjusted), Intravenous, PRN, Medina Clayton, AGACNP-BC    heparin 25,000 units in dextrose 5% (100 units/ml) IV bolus from bag LOW INTENSITY nomogram - [facility], 30 Units/kg (Adjusted), Intravenous, PRN, Medina Clayton, AGACNP-BC, 2,650 Units at 07/13/24 2308    heparin 25,000 units in dextrose 5% 250 mL (100 units/mL) infusion LOW INTENSITY nomogram - [facility], 0-40 Units/kg/hr (Adjusted), Intravenous, Continuous, Medina Clayton, AGACNP-BC, Last Rate: 11.5 mL/hr at 07/15/24 0001, 13 Units/kg/hr at 07/15/24 0001    metoprolol tartrate (LOPRESSOR) tablet 25 mg, 25 mg, Oral, BID, Medina Clayton, AGACNP-BC, 25 mg at 07/15/24 1023    midodrine tablet 10 mg, 10 mg, Oral, Daily PRN, Tasneem Ayers NP, 10 mg at 07/14/24 2220    mupirocin 2 % ointment, , Nasal, BID, Kristin Alvarado MD, Given at 07/15/24 1022    naloxone 0.4 mg/mL injection 0.02 mg, 0.02 mg, Intravenous, PRN, Zakia Miranda DO    NORepinephrine bitartrate-D5W 4 mg/250 mL (16 mcg/mL) PERIPHERAL access infusion, 0-0.2 mcg/kg/min, Intravenous, Continuous, Gloria Coker PA-C, Last Rate: 28.1 mL/hr at 07/15/24 1500, 0.06 mcg/kg/min at 07/15/24 1500    ondansetron injection 4 mg, 4 mg, Intravenous, Q6H PRN, Zakia Miranda DO, 4 mg at 07/15/24 1227    propofol (DIPRIVAN) 10 mg/mL infusion, 0-50 mcg/kg/min, Intravenous, Continuous, Gloria Coker PA-C, Last Rate: 7.5 mL/hr at 07/15/24 1500, 10 mcg/kg/min at 07/15/24 1500    simethicone chewable tablet 80 mg, 1 tablet, Oral, TID PRN, Zakia Miranda DO, 80 mg at 07/14/24 1123    sodium chloride 0.9% flush 10 mL, 10 mL, Intravenous, PRN, Hawa Landa MD         Objective:      Physical Exam:  Vitals: Temp: 97.9 °F (36.6 °C) (07/15/24 1500)  Pulse: (!) 119 (07/15/24 1511)  Resp: 20 (07/15/24 1006)  BP: (!) 80/50 (07/15/24 1400)  SpO2: (!) 94 % (07/15/24 1500)    Physical Exam  Constitutional:       Appearance: She is  ill-appearing.      Interventions: She is sedated and intubated.   Cardiovascular:      Rate and Rhythm: Tachycardia present.   Pulmonary:      Effort: She is intubated.      Breath sounds: Rhonchi present.   Neurological:      General: No focal deficit present.                 Labs:   Creatinine   Date Value Ref Range Status   07/15/2024 5.0 (H) 0.5 - 1.4 mg/dL Final     POC Creatinine   Date Value Ref Range Status   03/12/2021 1.1 0.5 - 1.4 mg/dL Final      Hemoglobin   Date Value Ref Range Status   07/15/2024 10.6 (L) 12.0 - 16.0 g/dL Final      Albumin   Date Value Ref Range Status   07/15/2024 1.9 (L) 3.5 - 5.2 g/dL Final   07/15/2024 1.8 (L) 3.5 - 5.2 g/dL Final   07/14/2024 1.9 (L) 3.5 - 5.2 g/dL Final          Imaging: reviewed    > 50% of 55 min visit spent in chart review, face to face discussion of goals of care,  symptom assessment, coordination of care, charting, and emotional support     Thank you for the opportunity to care for this patient and family.       Liza Pandey, CNS

## 2024-07-15 NOTE — SUBJECTIVE & OBJECTIVE
Interval History: NAEON. Afebrile. HDS. To OR today for PD cath removal. Heparin gtt held on AM rounds    Medications:  Continuous Infusions:   heparin (porcine) in D5W  0-40 Units/kg/hr (Adjusted) Intravenous Continuous 11.5 mL/hr at 07/15/24 0001 13 Units/kg/hr at 07/15/24 0001     Scheduled Meds:   0.9% NaCl   Intravenous Once    amiodarone  400 mg Oral BID    Followed by    [START ON 7/22/2024] amiodarone  200 mg Oral Daily    aspirin  81 mg Oral Daily    gentamicin   Topical (Top) TID    metoprolol tartrate  25 mg Oral BID    mupirocin   Nasal BID    sacubitriL-valsartan  1 tablet Oral BID     PRN Meds:  Current Facility-Administered Medications:     acetaminophen, 650 mg, Oral, Q6H PRN    albuterol sulfate, 2.5 mg, Nebulization, Q4H PRN    dextromethorphan-guaiFENesin  mg/5 ml, 5 mL, Oral, Q4H PRN    dextrose 10%, 12.5 g, Intravenous, PRN    dextrose 10%, 25 g, Intravenous, PRN    glucagon (human recombinant), 1 mg, Intramuscular, PRN    glucose, 16 g, Oral, PRN    glucose, 24 g, Oral, PRN    heparin (porcine), 1,000 Units, Intra-Catheter, PRN    heparin (PORCINE), 60 Units/kg (Adjusted), Intravenous, PRN    heparin (PORCINE), 30 Units/kg (Adjusted), Intravenous, PRN    midodrine, 10 mg, Oral, Daily PRN    naloxone, 0.02 mg, Intravenous, PRN    ondansetron, 4 mg, Intravenous, Q6H PRN    simethicone, 1 tablet, Oral, TID PRN    sodium chloride 0.9%, 10 mL, Intravenous, PRN     Review of patient's allergies indicates:   Allergen Reactions    Percocet [oxycodone-acetaminophen] Itching    Amiodarone analogues      Itching      Januvia [sitagliptin]     Jardiance [empagliflozin]      Leg cramps    Lipitor [atorvastatin] Other (See Comments)     Severe leg pain    Linaclotide Other (See Comments) and Nausea And Vomiting     Does not remember    Lubiprostone Other (See Comments) and Palpitations     Does not remember     Objective:     Vital Signs (Most Recent):  Temp: 98.7 °F (37.1 °C) (07/15/24 0301)  Pulse:  (!) 111 (07/15/24 0601)  Resp: (!) 25 (07/15/24 0601)  BP: 116/62 (07/15/24 0301)  SpO2: (!) 92 % (07/15/24 0611) Vital Signs (24h Range):  Temp:  [96.8 °F (36 °C)-99.4 °F (37.4 °C)] 98.7 °F (37.1 °C)  Pulse:  [] 111  Resp:  [17-33] 25  SpO2:  [88 %-97 %] 92 %  BP: ()/(50-85) 116/62     Weight: 128.5 kg (283 lb 4.7 oz)  Body mass index is 44.37 kg/m².    Intake/Output - Last 3 Shifts         07/13 0700  07/14 0659 07/14 0700  07/15 0659 07/15 0700  07/16 0659    P.O.  200     I.V. (mL/kg) 121.4 (0.9) 416.9 (3.2)     Other  400     IV Piggyback 26.5 26.5     Total Intake(mL/kg) 147.9 (1.2) 1043.4 (8.1)     Other  2700     Stool  0     Total Output  2700     Net +147.9 -1656.6            Urine Occurrence 1 x 1 x     Stool Occurrence 4 x 3 x              Physical Exam  Vitals and nursing note reviewed.   Constitutional:       General: She is not in acute distress.     Appearance: She is obese. She is ill-appearing. She is not diaphoretic.      Comments: Room air   HENT:      Head: Normocephalic and atraumatic.      Mouth/Throat:      Mouth: Mucous membranes are moist.      Pharynx: Oropharynx is clear.   Eyes:      Extraocular Movements: Extraocular movements intact.      Conjunctiva/sclera: Conjunctivae normal.   Neck:      Comments: L trialysis cath in place  Cardiovascular:      Rate and Rhythm: Tachycardia present.   Pulmonary:      Effort: Pulmonary effort is normal. No respiratory distress.   Abdominal:      General: There is no distension.      Palpations: Abdomen is soft.      Tenderness: There is no guarding or rebound.      Comments: Large pannus; PD catheter in place to left lower side of the pannus, clean and dry  No peritonitic signs    Musculoskeletal:         General: No deformity.   Skin:     General: Skin is warm and dry.   Neurological:      Mental Status: She is alert and oriented to person, place, and time.          Significant Labs:  I have reviewed all pertinent lab results within the  past 24 hours.    Significant Diagnostics:  I have reviewed all pertinent imaging results/findings within the past 24 hours.

## 2024-07-15 NOTE — ASSESSMENT & PLAN NOTE
Patient is a 74 year old female with above history now tolerating HD, consult to gen surg for PD catheter removal.     - NPO  - Hold heparin gtt   - To OR today for PD catheter removal   - Consent obtained. Placed in chart.   - Remainder of care per primary team  - Please contact general surgery with any questions, concerns, or clinical status changes

## 2024-07-15 NOTE — SUBJECTIVE & OBJECTIVE
Interval History:  Juhi is laying in bed this morning, says that she feels well, mentation seems around her baseline. Denies shortness of breath or feeling edematous.     Review of patient's allergies indicates:   Allergen Reactions    Percocet [oxycodone-acetaminophen] Itching    Amiodarone analogues      Itching      Januvia [sitagliptin]     Jardiance [empagliflozin]      Leg cramps    Lipitor [atorvastatin] Other (See Comments)     Severe leg pain    Linaclotide Other (See Comments) and Nausea And Vomiting     Does not remember    Lubiprostone Other (See Comments) and Palpitations     Does not remember     Current Facility-Administered Medications   Medication Frequency    0.9%  NaCl infusion Once    acetaminophen tablet 650 mg Q6H PRN    albuterol sulfate nebulizer solution 2.5 mg Q4H PRN    amiodarone tablet 400 mg BID    Followed by    [START ON 7/22/2024] amiodarone tablet 200 mg Daily    aspirin EC tablet 81 mg Daily    dextromethorphan-guaiFENesin  mg/5 ml liquid 5 mL Q4H PRN    dextrose 10% bolus 125 mL 125 mL PRN    dextrose 10% bolus 250 mL 250 mL PRN    gentamicin 0.1 % ointment TID    glucagon (human recombinant) injection 1 mg PRN    glucose chewable tablet 16 g PRN    glucose chewable tablet 24 g PRN    heparin (porcine) injection 1,000 Units PRN    heparin 25,000 units in dextrose 5% (100 units/ml) IV bolus from bag LOW INTENSITY nomogram - [facility] PRN    heparin 25,000 units in dextrose 5% (100 units/ml) IV bolus from bag LOW INTENSITY nomogram - [facility] PRN    heparin 25,000 units in dextrose 5% 250 mL (100 units/mL) infusion LOW INTENSITY nomogram - [facility] Continuous    metoprolol tartrate (LOPRESSOR) tablet 25 mg BID    midodrine tablet 10 mg Daily PRN    mupirocin 2 % ointment BID    naloxone 0.4 mg/mL injection 0.02 mg PRN    ondansetron injection 4 mg Q6H PRN    sacubitriL-valsartan 24-26 mg per tablet 1 tablet BID    simethicone chewable tablet 80 mg TID PRN    sodium chloride 0.9% flush 10  mL PRN       Objective:     Vital Signs (Most Recent):  Temp: 98.1 °F (36.7 °C) (07/15/24 0730)  Pulse: 103 (07/15/24 0749)  Resp: 20 (07/15/24 0749)  BP: 104/71 (07/15/24 0730)  SpO2: (!) 92 % (07/15/24 0749) Vital Signs (24h Range):  Temp:  [96.8 °F (36 °C)-99.4 °F (37.4 °C)] 98.1 °F (36.7 °C)  Pulse:  [] 103  Resp:  [17-33] 20  SpO2:  [88 %-97 %] 92 %  BP: ()/(50-85) 104/71     Weight: 128.5 kg (283 lb 4.7 oz) (07/08/24 2000)  Body mass index is 44.37 kg/m².  Body surface area is 2.46 meters squared.    I/O last 3 completed shifts:  In: 1043.4 [P.O.:200; I.V.:416.9; Other:400; IV Piggyback:26.5]  Out: 2700 [Other:2700]     Physical Exam  Constitutional:       General: She is not in acute distress.     Appearance: Normal appearance. She is obese.   HENT:      Head: Normocephalic and atraumatic.      Mouth/Throat:      Mouth: Mucous membranes are moist.      Pharynx: Oropharynx is clear.   Cardiovascular:      Rate and Rhythm: Normal rate and regular rhythm.      Heart sounds: Normal heart sounds.   Pulmonary:      Effort: Pulmonary effort is normal.      Breath sounds: Normal breath sounds.   Abdominal:      Palpations: Abdomen is soft.   Musculoskeletal:      Right lower leg: Edema (2+) present.      Left lower leg: Edema (2+) present.      Comments: Small amount of weeping in lower extremities.    Skin:     General: Skin is warm and dry.   Neurological:      General: No focal deficit present.      Mental Status: She is alert. She is disoriented.      Motor: Weakness present.          Significant Labs:  All labs within the past 24 hours have been reviewed.     Significant Imaging:  All imaging with the past 24 hours have been reviewed.

## 2024-07-15 NOTE — PROGRESS NOTES
07/15/24 0025   Post-Hemodialysis Assessment   Rinseback Volume (mL) 200 mL   Blood Volume Processed (Liters) 48.5 L   Dialyzer Clearance Moderately streaked   Duration of Treatment 180 minutes   Additional Fluid Intake (mL) 400 mL   Total UF (mL) 2700 mL   Net Fluid Removal 2300   Patient Response to Treatment beckie well   Post-Hemodialysis Comments stable     HD completed per MD order. Report given to primary RN.

## 2024-07-15 NOTE — PLAN OF CARE
[facility] - Cardiac Medical ICU  Discharge Reassessment    Primary Care Provider: Tony Sadler MD    Expected Discharge Date: 7/16/2024    Reassessment (most recent)       Discharge Reassessment - 07/15/24 0921          Discharge Reassessment    Assessment Type Discharge Planning Reassessment     Did the patient's condition or plan change since previous assessment? Yes     Discharge Plan discussed with: Patient     Communicated HARINI with patient/caregiver Date not available/Unable to determine     Discharge Plan A Home Health     Discharge Plan B Home with family     DME Needed Upon Discharge  other (see comments)   TBD    Transition of Care Barriers None     Why the patient remains in the hospital Requires continued medical care        Post-Acute Status    [facility] [address]    Post-Acute Placement Status Pending medical clearance/testing     Diaylsis Status Set-up Complete/Auth obtained     Coverage Medicare A & B and Medicaid [facility] QM     Discharge Delays None known at this time                   Future Appointments   Date Time Provider Department Center   9/16/2024 11:20 AM Paul Pedersen MD [facility]   11/18/2024  8:30 AM LAB, [facility]   11/20/2024 11:00 AM [facility] DEXA1 [facility] [place]   11/25/2024 11:30 AM DEEJAY Adams PA-C [facility] ENDOCRN [facility] spoke with dgjanene Velazquez) at bedside regarding outpatient dialysis arrangements.  Patient will be receiving outpatient dialysis with [facility] in [place], [place] on [address]  Patient is schedule to transfer to [facility].      Discharge Plan A and Plan B have been determined by review of patient's clinical status, future medical and therapeutic needs, and coverage/benefits for post-acute care in coordination with multidisciplinary team members.    Deonte Kaur, ENRIQUE  [facility]  [phone]

## 2024-07-15 NOTE — SIGNIFICANT EVENT
Patient transferred to the  on 14 floor WT then went to the OR with general surgery for PD catheter removal. Unfortunately, patient had an aspiration event during conscious sedation resulting in intubation. PD catheter removal was still performed with general surgery. Patient then transferred back to the MICU on mechanical ventilation for further monitoring.     Plan:  --follow up post-intubation cxr and abg  --vent orders placed  --sedated with propofol for RASS 0 to -1  --requiring low dose norepinephrine likely from sedation. Goal MAP >65  --will restart heparin gtt; ok per general surgery    Discussed with Dr. Alvarado. Daughter updated at the bedside.     Gloria Coker PA-C  Critical Care Medicine  7/15/2024   2:03 PM

## 2024-07-15 NOTE — PT/OT/SLP PROGRESS
Physical Therapy      Patient Name:  Juhi Taylor   MRN:  [id number]    Patient not seen today secondary to  (JORJE for PD catheter removal, emergently intubated following procedure). Will follow-up as appropriate.

## 2024-07-15 NOTE — CARE UPDATE
General Surgery Care Update     Patient taken to OR for PD catheter removal, LMA placed to begin case under monitored anesthesia care. Shortly after starting but prior to incision the patient was noted to begin vomiting copious amounts of succus and bile. The procedure was stopped and the patient was emergently intubated. Small amount of bile suctioned from ETT. Case restarted and PD catheter removed. During the case a bronchoscopy was performed and was fairly clear, see procedure note. Patient noted to be tachycardic with mild hypotension, arterial line placed. Decision made to leave patient intubated. MICU contacted.     Call made to family to update them on the intra-operative events and plan for MICU admission.     Krista Hollins MD  PGY-5, General Surgery  [facility]

## 2024-07-15 NOTE — PROGRESS NOTES
[facility] - Cardiac Medical ICU  General Surgery  Progress Note    Subjective:     History of Present Illness:  Juhi Taylor 74-year-old female with morbid obesity, CKD stage 5 s/p PD catheter placement 6/4, RCC s/p right nephrectomy, T2DM, CAD s/p PCI to Lcx and LAD in 11/2020 and HFmREF who is admitted as a transfer from [facility] for higher level of care and further cardiac evaluation. She initially presented on 6/18/24 to [facility] for hypervolemia in the setting of CKD 4-5 for which she had undergone elective PD catheter placement complicated by catheter site leaking. She had a complex hospital course and required placement of tunneled line for HD with volume removal. During her second dialysis session she went into PEA arrest for which she was resuscitated, intubated and transferred to ICU. She was noted to have elevated troponin and subsequent TTE found reduced EF from 40-45% to 20-25% for which coronary angiography was recommended. Her hospital course was also complicated by dialysis line infection, significant bleeding around the catheter site, bradycardia and subsequent atrial fibrillation with RVR. She was ultimately transferred [place] [facility] [place] level of care and admitted to MICU on 7/8 for urgent dialysis line placement and initiated on HD. She has since stabilized and is approaching step down status for the floor. She did not tolerate PD dialysis following placement.     General surgery now consulted for PD catheter removal. She is alert and oriented and stable for step down.     Post-Op Info:  Procedure(s) (LRB):  Insertion, Catheter, Central Venous, Hemodialysis (Right)  REMOVAL, CATHETER, HEMODIALYSIS   4 Days Post-Op     Interval History: NAEON. Afebrile. HDS. To OR today for PD cath removal. Heparin gtt held on AM rounds    Medications:  Continuous Infusions:   heparin (porcine) in D5W  0-40 Units/kg/hr (Adjusted) Intravenous Continuous 11.5 mL/hr at 07/15/24 0001 13 Units/kg/hr at 07/15/24 0001      Scheduled Meds:   0.9% NaCl   Intravenous Once    amiodarone  400 mg Oral BID    Followed by    [START ON 7/22/2024] amiodarone  200 mg Oral Daily    aspirin  81 mg Oral Daily    gentamicin   Topical (Top) TID    metoprolol tartrate  25 mg Oral BID    mupirocin   Nasal BID    sacubitriL-valsartan  1 tablet Oral BID     PRN Meds:  Current Facility-Administered Medications:     acetaminophen, 650 mg, Oral, Q6H PRN    albuterol sulfate, 2.5 mg, Nebulization, Q4H PRN    dextromethorphan-guaiFENesin  mg/5 ml, 5 mL, Oral, Q4H PRN    dextrose 10%, 12.5 g, Intravenous, PRN    dextrose 10%, 25 g, Intravenous, PRN    glucagon (human recombinant), 1 mg, Intramuscular, PRN    glucose, 16 g, Oral, PRN    glucose, 24 g, Oral, PRN    heparin (porcine), 1,000 Units, Intra-Catheter, PRN    heparin (PORCINE), 60 Units/kg (Adjusted), Intravenous, PRN    heparin (PORCINE), 30 Units/kg (Adjusted), Intravenous, PRN    midodrine, 10 mg, Oral, Daily PRN    naloxone, 0.02 mg, Intravenous, PRN    ondansetron, 4 mg, Intravenous, Q6H PRN    simethicone, 1 tablet, Oral, TID PRN    sodium chloride 0.9%, 10 mL, Intravenous, PRN     Review of patient's allergies indicates:   Allergen Reactions    Percocet [oxycodone-acetaminophen] Itching    Amiodarone analogues      Itching      Januvia [sitagliptin]     Jardiance [empagliflozin]      Leg cramps    Lipitor [atorvastatin] Other (See Comments)     Severe leg pain    Linaclotide Other (See Comments) and Nausea And Vomiting     Does not remember    Lubiprostone Other (See Comments) and Palpitations     Does not remember     Objective:     Vital Signs (Most Recent):  Temp: 98.7 °F (37.1 °C) (07/15/24 0301)  Pulse: (!) 111 (07/15/24 0601)  Resp: (!) 25 (07/15/24 0601)  BP: 116/62 (07/15/24 0301)  SpO2: (!) 92 % (07/15/24 0611) Vital Signs (24h Range):  Temp:  [96.8 °F (36 °C)-99.4 °F (37.4 °C)] 98.7 °F (37.1 °C)  Pulse:  [] 111  Resp:  [17-33] 25  SpO2:  [88 %-97 %] 92 %  BP:  ()/(50-85) 116/62     Weight: 128.5 kg (283 lb 4.7 oz)  Body mass index is 44.37 kg/m².    Intake/Output - Last 3 Shifts         07/13 0700  07/14 0659 07/14 0700  07/15 0659 07/15 0700 07/16 0659    P.O.  200     I.V. (mL/kg) 121.4 (0.9) 416.9 (3.2)     Other  400     IV Piggyback 26.5 26.5     Total Intake(mL/kg) 147.9 (1.2) 1043.4 (8.1)     Other  2700     Stool  0     Total Output  2700     Net +147.9 -1656.6            Urine Occurrence 1 x 1 x     Stool Occurrence 4 x 3 x              Physical Exam  Vitals and nursing note reviewed.   Constitutional:       General: She is not in acute distress.     Appearance: She is obese. She is ill-appearing. She is not diaphoretic.      Comments: Room air   HENT:      Head: Normocephalic and atraumatic.      Mouth/Throat:      Mouth: Mucous membranes are moist.      Pharynx: Oropharynx is clear.   Eyes:      Extraocular Movements: Extraocular movements intact.      Conjunctiva/sclera: Conjunctivae normal.   Neck:      Comments: L trialysis cath in place  Cardiovascular:      Rate and Rhythm: Tachycardia present.   Pulmonary:      Effort: Pulmonary effort is normal. No respiratory distress.   Abdominal:      General: There is no distension.      Palpations: Abdomen is soft.      Tenderness: There is no guarding or rebound.      Comments: Large pannus; PD catheter in place to left lower side of the pannus, clean and dry  No peritonitic signs    Musculoskeletal:         General: No deformity.   Skin:     General: Skin is warm and dry.   Neurological:      Mental Status: She is alert and oriented to person, place, and time.          Significant Labs:  I have reviewed all pertinent lab results within the past 24 hours.    Significant Diagnostics:  I have reviewed all pertinent imaging results/findings within the past 24 hours.  Assessment/Plan:     * ESRD (end stage renal disease)  Patient is a 74 year old female with above history now tolerating HD, consult to gen surg  for PD catheter removal.     - NPO  - Hold heparin gtt   - To OR today for PD catheter removal   - Consent obtained. Placed in chart.   - Remainder of care per primary team  - Please contact general surgery with any questions, concerns, or clinical status changes          Case discussed with Dr. Beckham.      David Charles PA-C  General Surgery  [facility] - Cardiac Medical ICU

## 2024-07-15 NOTE — PROGRESS NOTES
07/14/24 2115   During Hemodialysis Assessment   Blood Flow Rate (mL/min) 350 mL/min   Dialysate Flow Rate (mL/min) 700 ml/min   Ultrafiltration Rate (mL/Hr) 830 mL/Hr   Arteriovenous Lines Secure Yes   Arterial Pressure (mmHg) -170 mmHg   Venous Pressure (mmHg) 110   Blood Volume Processed (Liters) 0 L   UF Removed (mL) 0 mL   TMP 10   Venous Line in Air Detector Yes   Intake (mL) 200 mL   Transducer Dry Yes   Access Visible Yes   Intra-Hemodialysis Comments HD initiated     Report received from primary RN. HD started per MD order.

## 2024-07-15 NOTE — ASSESSMENT & PLAN NOTE
Pt previously on PD. Unable to remove enough volume with PD catheter. Initially upgraded to MICU for emergent trialysis placement and CRRT. Now with tunneled dialysis catheter. Tolerated session of HD on 7/12 without complications.     - Nephrology following, appreciate recs  - Strict I/Os  - Renally dose meds  - Avoid nephrotoxic agents  - General surgery consulted to remove PD catheter today  - Tolerated HD session overnight 07/15

## 2024-07-15 NOTE — SUBJECTIVE & OBJECTIVE
Interval History/Significant Events: Passed SAT/SBT. Extubated to nasal cannula.     Review of Systems   Unable to perform ROS: Age     Objective:     Vital Signs (Most Recent):  Temp: 97.9 °F (36.6 °C) (07/15/24 1006)  Pulse: 104 (07/15/24 1006)  Resp: 20 (07/15/24 1006)  BP: 106/67 (07/15/24 1006)  SpO2: 95 % (07/15/24 1006) Vital Signs (24h Range):  Temp:  [97.5 °F (36.4 °C)-99.4 °F (37.4 °C)] 97.9 °F (36.6 °C)  Pulse:  [] 104  Resp:  [17-33] 20  SpO2:  [88 %-97 %] 95 %  BP: ()/(50-85) 106/67   Weight: 128.5 kg (283 lb 4.7 oz)  Body mass index is 44.37 kg/m².      Intake/Output Summary (Last 24 hours) at 7/15/2024 1323  Last data filed at 7/15/2024 0601  Gross per 24 hour   Intake 791.14 ml   Output 2700 ml   Net -1908.86 ml          Physical Exam  Vitals and nursing note reviewed.   Constitutional:       Appearance: She is obese. She is ill-appearing.   Eyes:      Pupils: Pupils are equal, round, and reactive to light.   Cardiovascular:      Rate and Rhythm: Normal rate and regular rhythm.      Pulses: Normal pulses.      Heart sounds: Normal heart sounds.   Pulmonary:      Comments: Decreased at the bases  Abdominal:      General: Bowel sounds are normal.      Palpations: Abdomen is soft.   Musculoskeletal:         General: Normal range of motion.   Skin:     General: Skin is warm and dry.      Findings: Bruising present.   Neurological:      Mental Status: She is oriented to person, place, and time.            Vents:  Oxygen Concentration (%): 28 (07/05/24 2320)  Lines/Drains/Airways       Central Venous Catheter Line  Duration                  Hemodialysis Catheter 07/11/24 1219 left subclavian 4 days              Airway  Duration                  Airway - Non-Surgical 07/15/24 1216 <1 day              Peripheral Intravenous Line  Duration                  Midline Catheter - Single Lumen 07/12/24 0430 Right basilic vein (medial side of arm) 3 days         Peripheral IV - Single Lumen 07/12/24 1700  20 G 1 3/4 in Anterior;Distal;Right Forearm 2 days                  Significant Labs:    CBC/Anemia Profile:  Recent Labs   Lab 07/14/24  0512 07/15/24  0327   WBC 17.77* 19.38*   HGB 9.9* 10.1*   HCT 32.3* 31.5*   * 151   MCV 85 81*   RDW 21.7* 21.2*        Chemistries:  Recent Labs   Lab 07/14/24  0512 07/15/24  0327   * 132*   K 4.3 4.3    101   CO2 22* 20*   BUN 45* 36*   CREATININE 5.7* 4.7*   CALCIUM 8.2* 8.1*   ALBUMIN 1.9* 1.8*   PROT 4.9* 4.8*   BILITOT 0.9 0.9   ALKPHOS 156* 133   ALT 43 32   AST 30 22   MG 2.2 2.1   PHOS 5.6* 4.7*       All pertinent labs within the past 24 hours have been reviewed.    Significant Imaging:  I have reviewed all pertinent imaging results/findings within the past 24 hours.

## 2024-07-15 NOTE — ASSESSMENT & PLAN NOTE
Nephrology consulted for inpatient-HD management  admitted as a transfer for acute HFrEF in setting of CKD5 and recent PEA arrest during dialysis. Recent PD catheter placement on 6/4 but concerned for a possibly leak from cath. Unable to initiate dialysis for 2 weeks PTA to OSH due to insurance issues. Hospital course complicated by infected tunneled cath removed on 6/30. Transferred here for higher level of care. Initial plan at OSH was to replace tunneled catheter on the left side due to concerns to that right side may not by suitable. PD was initiated here in hospital however we were unable to remove significant amounts of fluid, At this time the daughter wishes to proceed with HD.     Plan/Recommendations  -Tunnel cath placed 7/11, over the weekend had HD done and 2.7 L removed, clotting issue with HD, Cath flow,TPA.   -Per interventional nephrology recommendation will need 5000IU/ml Heparin lock in each port with each dialysis session.   - use 1500 IU Heparin with the priming of the machine   - Renally adjust medications  - Pre and Post-HD weights   - Continue to monitor intake and output

## 2024-07-15 NOTE — PT/OT/SLP PROGRESS
Speech Language Pathology      Juhi Taylor  MRN: [id number]    Patient not seen today secondary to NPO for procedure. Will follow-up per SLP POC. Continue with most recent recommendations as follows:     Recommendations:                  General Recommendations:  Dysphagia therapy  Diet recommendations:  Regular Diet - IDDSI Level 7, Liquid Diet Level: Thin liquids - IDDSI Level 0   Aspiration Precautions: Small bites/sips and Standard aspiration precautions   General Precautions: Standard, aspiration, fall  Communication strategies:  none       7/15/2024

## 2024-07-15 NOTE — BRIEF OP NOTE
[facility] - Surgery ([facility])  Brief Operative Note    SUMMARY     Surgery Date: 7/15/2024     Surgeons and Role:     * Tanya Beckham MD - Primary     * Tio Solis MD - Resident - Assisting     * Krista Hollins MD - Resident - Chief    Pre-op Diagnosis:  CKD stage 4 secondary to hypertension [I12.9, N18.4]    Post-op Diagnosis:  Post-Op Diagnosis Codes:     * CKD stage 4 secondary to hypertension [I12.9, N18.4]    Procedure(s) (LRB):  REMOVAL, CATHETER, DIALYSIS, PERITONEAL (N/A)  BRONCHOSCOPY, FLEXIBLE (N/A)    Anesthesia: General    Implants:  * No implants in log *    Operative Findings: two cuffs freed from surrounding tissue, fascial defect closed. Skin above fascial defect closed, skin where PD catheter exited the skin left open    Estimated Blood Loss: <5cc         Specimens:   Specimen (24h ago, onward)      None            [id number]    Tio Solis MD  General Surgery PGY-2

## 2024-07-15 NOTE — ANESTHESIA PROCEDURE NOTES
Intubation    Date/Time: 7/15/2024 12:16 PM    Performed by: Nima Cabezas CRNA  Authorized by: Jose Roberto Pruett Jr., MD    Intubation:     Induction:  Rapid sequence induction    Mask Ventilation:  Not attempted    Attempts:  1    Attempted By:  CRNA (Anne CRNA)    Method of Intubation:  Video laryngoscopy    Blade:  Blakely 4    Laryngeal View Grade: Grade I - full view of cords      Difficult Airway Encountered?: No      Complications:  None    Airway Device Size:  7.0    Style/Cuff Inflation:  Cuffed    Inflation Amount (mL):  5    Tube secured:  22    Secured at:  The gums    Placement Verified By:  Capnometry    Complicating Factors:  None    Findings Post-Intubation:  BS equal bilateral and atraumatic/condition of teeth unchanged

## 2024-07-15 NOTE — ANESTHESIA PROCEDURE NOTES
Arterial    Diagnosis: x    Patient location during procedure: done in OR    Staffing  Authorizing Provider: Jose Roberto Pruett Jr., MD  Performing Provider: Nima Cabezas CRNA    Staffing  Performed by: Nima Cabezas CRNA  Authorized by: Jose Roberto Pruett Jr., MD    Anesthesiologist was present at the time of the procedure.    Preanesthetic Checklist  Completed: patient identified, IV checked, site marked, risks and benefits discussed, surgical consent, monitors and equipment checked, pre-op evaluation, timeout performed and anesthesia consent givenArterial  Skin Prep: alcohol swabs  Local Infiltration: none  Orientation: left  Location: radial    Catheter Size: 20 G  Catheter placement by Ultrasound guidance. Heme positive aspiration all ports.   Vessel Caliber: patent  Vascular Doppler:  not done  Needle advanced into vessel with real time Ultrasound guidance.Insertion Attempts: 1  Assessment  Dressing: secured with tape and tegaderm  Patient: Tolerated well

## 2024-07-16 PROBLEM — Z99.11 ON MECHANICALLY ASSISTED VENTILATION: Status: RESOLVED | Noted: 2024-07-15 | Resolved: 2024-07-16

## 2024-07-16 NOTE — NURSING
Pt extubated and now on room air tolerating well. AAOx4. A fib with rates . T max 99.5. BP WNL. Cath flow x2 times today; pt due for dialysis today if catheter has return of function. Tolerating diet. Anuric this shift. Family at bedside.

## 2024-07-16 NOTE — PT/OT/SLP RE-EVAL
Occupational Therapy   Re-evaluation and Co-Treatment     Name: Juhi Taylor  MRN: [id number]  Admitting Diagnosis:  ESRD (end stage renal disease)  Recent Surgery: Procedure(s) (LRB):  REMOVAL, CATHETER, DIALYSIS, PERITONEAL (N/A)  BRONCHOSCOPY, FLEXIBLE (N/A) 1 Day Post-Op    Recommendations:     Discharge Recommendations: Moderate Intensity Therapy  Discharge Equipment Recommendations: walker, rolling, bedside commode  Barriers to discharge:       Assessment:     Juhi Taylor is a 74 y.o. female with a medical diagnosis of ESRD (end stage renal disease). Pt presents with decreased endurance and impaired mobility performance as limited by cardiovascular status and generalized weakness. Pt motivated to participate today s/p extubation. Session focused on EOB ADLs and sitting balance tasks with pt tolerating ~6 minutes prior to fatigue. Pt required CGA-mod A at EOB for balance with decreased endurance noted and global weakness present.  At this time, pt is a high fall risk and not at their baseline. Prior to hospitalizations, pt was mod I for ADLs/mobility.   Patient continues to demonstrate the need for moderate intensity therapy on a daily basis post acute exhibited by decreased independence with self-care and functional mobility    Performance deficits affecting function are weakness, gait instability, decreased upper extremity function, impaired balance, decreased lower extremity function, impaired endurance, impaired self care skills, impaired functional mobility, impaired cardiopulmonary response to activity.      Rehab Prognosis:    Good ; patient would benefit from acute skilled OT services to address these deficits and reach maximum level of function.       Plan:     Patient to be seen 4 x/week to address the above listed problems via self-care/home management, therapeutic activities, therapeutic exercises, neuromuscular re-education  Plan of Care Expires: 08/16/24  Plan of Care Reviewed with:  patient, sibling    Subjective     Chief Complaint: none  Patient/Family stated goals: to sit on the side of the bed   Communicated with: RN prior to session.  Pain/Comfort:  Pain Rating 1: 0/10  Pain Rating Post-Intervention 1: 0/10    Objective:     Communicated with: rn prior to session.  Patient found HOB elevated with: pulse ox (continuous), arterial line, telemetry, peripheral IV, Trialysis, blood pressure cuff upon OT entry to room.    General Precautions: Standard, fall  Orthopedic Precautions: N/A  Braces: N/A  Respiratory Status: Room air    Occupational Performance:    Bed Mobility:    Patient completed Rolling/Turning to Right with maximal assistance   Patient completed Scooting/Bridging with maximal assistance   Patient completed Supine to Sit with max assistance and 2 persons  Patient completed Sit to Supine with total assistance and 2 persons    Functional Mobility/Transfers:  Functional Mobility: Pt sat EOB with CGA-mod A ~6 minutes (CGA for static and mod A for dynamic)    Activities of Daily Living:  Grooming: contact guard assistance washing face at EOB     Cognitive/Visual Perceptual:  Cognitive/Psychosocial Skills:     -       Oriented to: Person, Place, Time, and Situation   -       Follows Commands/attention:Follows multistep  commands  -       Communication: clear/fluent  -       Memory: No Deficits noted  -       Safety awareness/insight to disability: intact   -       Mood/Affect/Coping skills/emotional control: Appropriate to situation    Physical Exam:  Balance:    -       demo fair sitting balance with decrease in balance due to fatigue   Upper Extremity Range of Motion:     -       Right Upper Extremity: WFL  -       Left Upper Extremity: WFL  Upper Extremity Strength:    -       Right Upper Extremity: Deficits: 4/5  -       Left Upper Extremity: Deficits: 4/5   Strength: -       Right Upper Extremity: WFL  -       Left Upper Extremity: WFL    AMPA 6 Click:  [facility] Total Score:  16    Treatment & Education:  Pt educated on role of occupational therapy, POC, and safety during ADLs and functional mobility. Pt and OT discussed importance of safe, continued mobility to optimize daily living skills. Pt verbalized understanding.     White board updated during session. Pt given instruction to call for medical staff/nurse for assistance.       Patient left HOB elevated with all lines intact, call button in reach, RN notified, and sister present    GOALS:   Multidisciplinary Problems       Occupational Therapy Goals          Problem: Occupational Therapy    Goal Priority Disciplines Outcome Interventions   Occupational Therapy Goal     OT, PT/OT Progressing    Description: Goals to be met by: 08/04/24     Patient will increase functional independence with ADLs by performing:    UE Dressing with Modified [place].  LE Dressing with Minimal Assistance.  Grooming while standing at sink with Stand-by Assistance.  Toileting from bedside commode with Minimal Assistance for hygiene and clothing management.   Toilet transfer to bedside commode with Supervision.    DME:  Tub transfer bench is required for pt to return to t/s use with shower chair at present unsuitable with need for mobiltiy greater than pt can safely complete at present.     Patient has a mobility limitation that significantly impairs their ability to participate in one or more mobility related activities of daily living, including toileting. This deficit can be resolved by using a bedside commode. Patient demonstrates mobility limitations that will cause them to be confined to one room at home without bathroom access for up to 30 days. Using a bedside commode will greatly improve the patient's ability to participate in MRADLs.     Ambulation needs TBD on progress.                              History:     Past Medical History:   Diagnosis Date    Anticoagulant long-term use     Arthritis     Breast cancer 2014    invasive lobular  carcinoma    Cancer of kidney 11/2020    RIGHT KIDNEY CANCER    CHF (congestive heart failure)     Coronary artery disease dx 2005    Depression     Diabetes mellitus     Diastolic heart failure secondary to hypertension     Gout     Hyperlipemia     Hypertension     Hypertrophy of nasal turbinates     Kidney mass 2020    Right    Levoscoliosis     Lung nodule     left    Multiple thyroid nodules     NICOLE (obstructive sleep apnea)     uses C-PAP    Pulmonary hypertension     Severe sepsis 07/01/2024         Past Surgical History:   Procedure Laterality Date    AORTOGRAPHY N/A 12/04/2020    Procedure: Aortogram;  Surgeon: Paul Pedersen MD;  Location: [facility] CATH;  Service: Cardiology;  Laterality: N/A;    BREAST SURGERY      BRONCHOSCOPY N/A 7/15/2024    Procedure: BRONCHOSCOPY, FLEXIBLE;  Surgeon: Tanya Beckham MD;  Location: [address];  Service: General;  Laterality: N/A;    CARDIAC CATHETERIZATION  12/2020    CHOLECYSTECTOMY      COLONOSCOPY      multi -last 2014     CORONARY ARTERY BYPASS GRAFT      ESOPHAGOGASTRODUODENOSCOPY      2012     HAND SURGERY Right     INSERTION OF CATHETER N/A 6/23/2024    Procedure: Insertion,catheter;  Surgeon: Meli Griffin MD;  Location: [facility] OR;  Service: Vascular;  Laterality: N/A;  ORIGINAL CATHETER WAS REPOSITIONED.  NO NEW CATHETER WAS PLACED    INSERTION OF TUNNELED CENTRAL VENOUS HEMODIALYSIS CATHETER Right 7/11/2024    Procedure: Insertion, Catheter, Central Venous, Hemodialysis;  Surgeon: Hawa Landa MD;  Location: [facility] CATH LAB;  Service: Interventional Nephrology;  Laterality: Right;    INSERTION, CATHETER, DIALYSIS, PERITONEAL N/A 6/4/2024    Procedure: IN Insertion Catheter, Dialysis, Peritoneal - laparoscopic;  Surgeon: Rodriguez Catalan Jr., MD;  Location: [facility];  Service: General;  Laterality: N/A;    LAPAROSCOPIC ROBOT-ASSISTED SURGICAL REMOVAL OF KIDNEY USING DA CHELLE XI Right 03/10/2022    Procedure: XI ROBOTIC NEPHRECTOMY- radical;   "Surgeon: Rolando Ramirez MD;  Location: [facility] OR;  Service: Urology;  Laterality: Right;    MASTECTOMY W/ SENTINEL NODE BIOPSY Bilateral 01/21/2015    bilateral "dog ears"    NASAL SINUS SURGERY  2015    Dr Bryant FESS/cauterization turbinate     PARTIAL HYSTERECTOMY  1989    PERCUTANEOUS TRANSLUMINAL BALLOON ANGIOPLASTY OF CORONARY ARTERY  12/04/2020    Procedure: Angioplasty-coronary;  Surgeon: Paul Pedersen MD;  Location: [facility] CATH;  Service: Cardiology;;    PERITONEAL CATHETER REMOVAL N/A 7/15/2024    Procedure: REMOVAL, CATHETER, DIALYSIS, PERITONEAL;  Surgeon: Tanya Beckham MD;  [facility];  Service: General;  Laterality: N/A;    REMOVAL OF HEMODIALYSIS CATHETER  7/11/2024    Procedure: REMOVAL, CATHETER, HEMODIALYSIS;  Surgeon: Hawa Landa MD;  Location: [facility] CATH LAB;  Service: Interventional Nephrology;;    RENAL BIOPSY Right     9/20/2021 EJ    TUBAL LIGATION      ULTRASOUND GUIDANCE  12/04/2020    Procedure: Ultrasound Guidance;  Surgeon: Paul Pedersen MD;  Location: [facility] CATH;  Service: Cardiology;;       Time Tracking:     OT Date of Treatment: 07/16/24  OT Start Time: 1316  OT Stop Time: 1342  OT Total Time (min): 26 min    Billable Minutes:Re-eval 10 min  Self Care/Home Management 16 min    7/16/2024    "

## 2024-07-16 NOTE — ASSESSMENT & PLAN NOTE
Ayo Coker   MRN: [id number]    Department:  [facility] Emergency Department   Date of Visit:  9/24/2018           Disclosure     Insurance plans vary and the physician(s) referred by the ER may not be covered by your plan.  Please contact Latest A1C 6.8; on Metformin at home    - POC glucose  - Hypoglycemia protocol   CARE PHYSICIAN AT ONCE OR RETURN IMMEDIATELY TO THE EMERGENCY DEPARTMENT. If you have been prescribed any medication(s), please fill your prescription right away and begin taking the medication(s) as directed.   If you believe that any of the medications

## 2024-07-16 NOTE — PROGRESS NOTES
Arrived at pts room with HD equipments.  Arterial port has no blood return upon aspiration.  Venous port is sluggish and lines reversed.  With only BFR of 150,the AP is already -300.  Stopped the machine and pt rinsed back.  Catheter is not working, primary RN informed.

## 2024-07-16 NOTE — ASSESSMENT & PLAN NOTE
Patient is a 74 year old female with above history now tolerating HD, consult to gen surg for PD catheter removal. S/p PD cath removal and incision left open    - diet per primary team once extubated  - Okay for hep gtt  - Incision that was left open needs re-packing wet to dry every day  - Remainder of care per primary team  - Please contact general surgery with any questions, concerns, or clinical status changes. We will sign off.

## 2024-07-16 NOTE — PT/OT/SLP PROGRESS
"Speech Language Pathology Treatment    Patient Name:  Juhi Tayolr   MRN:  [id number]  Admitting Diagnosis: ESRD (end stage renal disease)    Recommendations:                 General Recommendations:   follow up x 1 to ensure diet tolerance  Diet recommendations:  Regular Diet - IDDSI Level 7, Liquid Diet Level: Thin liquids - IDDSI Level 0   Aspiration Precautions: 1 bite/sip at a time, Alternating bites/sips, Assistance with meals, Avoid talking while eating, Feed only when awake/alert, HOB to 90 degrees, Meds whole 1 at a time, Monitor for s/s of aspiration, Small bites/sips, and Strict aspiration precautions   General Precautions: Standard, aspiration, fall  Communication strategies:  none    Assessment:     Juhi Taylor is a 74 y.o. female. Swallowing abilities appear functional for regular consistency diet and thin liquids. Aspiration precautions should be followed. SLP services to follow up x 1 to ensure diet tolerance.     Subjective     "I didn't realize GERD could cause all these problems."     Pain/Comfort:  Pain Rating 1: 0/10    Respiratory Status: Room air    Objective:     Has the patient been evaluated by SLP for swallowing?   Yes  Keep patient NPO? No   Current Respiratory Status:        Pt seen for follow up to ensure safety upon resumption of PO diet after pt had to be emergently intubated following procedure yesterday due to an aspiration event in the OR. Pt now extubated and on room air. RN reported pt did well tolerating ice chips and water.  Diet orders resumed prior to SLP's re-assessing pt.  Pt positioned upright to accepted the following PO trials: ice chip x 1, thin water via tsp x1, cup sip x 1, straw sips x 4, puree 1/2 tsp x 1 and full tsp x 1, and 1/5 cracker x 2.  Oral and pharyngeal phases of the swallow appeared to be functional. No overt s/s of aspiration observed.  Education provided to pt regarding ongoing swallowing assessment, impression of swallowing abilities, " diet recommendations, aspiration and reflux precautions, and plan to follow up x 1 to ensure diet tolerance. Pt demonstrated understanding.     Goals:   Multidisciplinary Problems       SLP Goals          Problem: SLP    Goal Priority Disciplines Outcome   SLP Goal     SLP    Description: Speech Language Pathology Goals  Goals expected to be met by 7/18    1. Pt will participate in ongoing swallow assessment                               Plan:     Patient to be seen:  3 x/week   Plan of Care expires:     Plan of Care reviewed with:  patient, family   SLP Follow-Up:  Yes       Discharge recommendations:   (likely no SLP needs post d/c)     Time Tracking:     SLP Treatment Date:   07/16/24  Speech Start Time:  1231  Speech Stop Time:  1246     Speech Total Time (min):  15 min    Billable Minutes: Treatment Swallowing Dysfunction 7 and Self Care/Home Management Training 8    07/16/2024

## 2024-07-16 NOTE — PROGRESS NOTES
" Palliative Medicine  Consult Note       Patient Name: Juhi Taylor   MRN: [id number]   Admission Date: 7/3/2024   Hospital Length of Stay: 13   Attending Provider: Kristin Alvarado MD   Consulting Provider: SEAMUS Gautam  Primary Care Physician: Tony Sadler MD   Principal Problem: ESRD (end stage renal disease)     Patient information was obtained from relative(s), past medical records, and ER records.           Assessment/Plan:    CHF with complicated cardiac history.  Patient does not want further intervention and wants to go home. Youngest daughter wants to continue treatments.  Will need sequential treatments in efforts to be "better" but no one is convinced that she has the functional status to get "better".  ESRD does not want HD catheter placed. Will ask Nephro if PD can be restarted.    Palliative Care Encounter:  Impression:  Elderly woman with multiple medical problems including end-stage renal disease, worsening heart failure as well as significant dysrhythmias.  Patient says that she wants to go home however it is unclear if she understands that going home means that she will have a limited life span.  In her current date I am not sure she has a candidate for either peritoneal or hemodialysis.    Palliative care consulted for goals of care and decision making.       Advance Care Planning   Advance Directives:   Living Will: No    Do Not Resuscitate Status: No    Medical Power of : No      Decision Making:  Family answered questions and Patient unable to communicate due to disease severity/cognitive impairment  Goals of Care: What is most important right now is to focus on extending life as long as possible, even it it means sacrificing quality. Accordingly, we have decided that the best plan to meet the patient's goals includes continuing with treatment.        7/16/24: Ms. Taylor was extubated this and doing well on NC. I visited with her this morning who is AAOx3, and " her sister was in the room.   -discussed with pt hospital course that has taken place since her original admission to [facility]. She was able to recall some of it and appreciative of review.  -Discussed that our goal as healthcare providers is to make sure we are treating her in a way that is meaningful for her and aligns with her values/wishes. She is understanding of this.   -Her current goal is to be discharged to a rehab facility for strengthening so that she may return home. She is ok with receiving dialysis if she were able to go a few days a week.  -She shares she would never want to have to live in a facility for the rest of her life, no matter the reason. Feel if this were the case, she would rather be kept comfortable at home through end of life.  -Explored what QOL means to her: She shared it means maintaining her independence in getting around her home and ability to get her own groceries.   -Pt shares her henrry in God and that when the Lord is ready for her, she's ready too.  -Discussed that sometimes despite our hopes and plans, things occur that prevent us from reaching our goals. Discussed concern about her overall health and debility related to hospitalization. Explained importance of letting her family know what she does/does not want for medial care in the event she is unable to speak for herself again. She is understanding of this and asks to think about it for a bit. She is agreeable to me coming back tomorrow to continue conversation.   -dnr not dicussed today due to pt asking to continue conversation tomorrow.     -Spoke with pt's daughters, Nell and Maria Luisa, who are both agreeable to meeting tomorrow about 10 am so that pt can discuss her GOC. Maria Luisa shares their brother, Dawood will likely be at work but can call in to participate.   -Communicated with primary team.       7/15/24: Pt was slated to have stepdown orders and transition to floor today following PD catheter removal. However,  "aspiration event in OR for removal resulted in intubation. Upon my visit this afternoon pt is intubated on Levophed and propofol. Daughter, Maria Luisa, is at bs and agreeable to participate in coversation.   -Maria Luisa shares that her mother was very awake over the weekend and she encouraged her mother to share what she does/does not want for her medical care. Maria Luisa shares that her mother kept saying, "whatever the doctor's say to do" is fine with her.   -No specific GOC were recorded with pt over the weekend.   -Maria Luisa shares she feels conflicted b/c her mother had previously shared with her that she is tired and just wants to go home. Mother and daughter were supposed to discuss her GOC in more detail this afternoon but now pt is intubated.  -Discussed that Pal med will continue to follow, as pt is still very sick and although we hope to wean off pressors and extubate, there are no guarantees.   -Discussed DNR with Maria Luisa. She shares her mother always said "it's in God's hands" if she lives or not. Discussed that cardiac arrest is a natural death and some people view CPR as intervening on God's plan. She is agreeable to continuing conversations and hopes for her mother to wake up and make her own decisions on the matter.   -Discussed immediate goals for pt to be extubated, off pressors, and able to tolerate HD, in order to continue with plan of ltac/rehab with outpatient dialysis.  **Discussed that [facility] will visit daily with goal of having difficult conversations with pt as soon as appropriate.       - Prior experience with serious illness: yes  -The patient has previously engaged in advance care planning or GOC discussions  - Insight/Understanding of illness: difficult decision making between family members.    Patient does not have a healthcare power of  but she has 3 of 4 living children.  Son Dawood and daughter Maria Luisa are in agreement however daughter francy is not willing to participate in the " conversation.  But no decisions have been made    Life Limiting Diagnosis:  ESHD and ESRD  -Prognosis-Time and potential for recovery: poor  -Functional status: poor.  Pt was not able to manage ADLs  -Dementia diagnosis no      Symptom Management:  -Pain: no  -Debility: pt has been bed bound throughout duration of hospitalization. She was walking some prior. Family is understanding that pt will need LTAC/Rehab upon d/c.     -Dyspnea no      -Anxiety/Depression no      -Constipation: no      -Anorexia:yes      Summary of recommendations and follow up plan:  -Most important goals at this time: Further discussion about care plans and what might be beneficial   -Code status: Full Code but did make a recommendation that her status change to allow natural death.  -Disposition: continue current level of care.      Thank you for your consult. I will follow-up with patient. Please contact us if you have any additional questions.       Subjective:     Chief Complaint: No chief complaint on file.        HPI:   Ms. Taylor is a 73yo female with a PMHx of CAD, ESRD on PD, HFrEF of 40%, and afib on eliquis who was transferred to [facility] from [facility] for a higher level of care. She originally presented to [facility] ED on 6/18 for worsening SOB and was found to be satting 89% on RA determined to be due to a heart failure exacerbation. She was given lasix gtt and metolazone but still produced insufficient urine. HD was started, but during a dialysis session on 6/24 she suffered PEA cardiac arrest, epi was administered and she converted to V tach. After cardioversion ROSC was achieved and she was intubated and transferred to the ICU. Repeat Echo revealed an EF of 20-25% from 40% on admission but cardiology did not intervene at this time to due to her clinical condition but recommended later evaluation for ICD and angiogram. Her condition improved with further HD. She was extubated and moved to the floor but developed an uptrending leukocytosis  possibly originating from a left trialysis catheter with bruising and possible purulent discharge. The line was removed. She was determined to not be a candidate for hospice. The family requested transfer to [facility] for a higher level of care.       Hospital Course:  Since arrival to [facility], patient has refused interventions.  This morning in fact she refused taking her medications.  I was asked to see her to help delineate goals of care and further treatments.  Her son and oldest daughter Maria Luisa feel that the patient is nearing the end of her life.  Other daughter francy does not want to hear anything about this.  She also does not think that her mother is capable of making any decisions.  Patient was pretty clear about going home and yesterday apparently understood the ramifications of stopping all of her current treatments.    Review of Symptoms      Symptom Assessment (ESAS 0-10 Scale)  Pain:  0  Dyspnea:  0  Anxiety:  0  Nausea:  0  Depression:  0  Anorexia:  0  Fatigue:  0  Insomnia:  0  Restlessness:  0  Agitation:  0  Unable to complete assessment due to Mental status change     CAM / Delirium:  Negative  Constipation:  Negative  Diarrhea:  Negative      Bowel Management Plan (BMP):  Yes      Pain Assessment:  OME in 24 hours:  0  Location(s):      Pain Assessment in Advanced Demential Scale (PAINAD)   Breathing - Independent of vocalization:  0  Negative vocalization:  0  Facial expression:  0  Body language:  0  Consolability:  0  Total:  0    Modified Chago Scale:  0    Performance Status:  40    Living Arrangements:  Lives with family, Lives in home and Lives >50 miles from facility    Psychosocial/Cultural:   See Palliative Psychosocial Note: No  , 3 living children.    of pancreatic cancer with hospice. Son  with hospice as well.  Bam Camejo lives with patient, daughter Criss and son Dawood /wife supportive. They do not want mom to suffer.  **Primary  to  Follow**  Palliative Care  Consult: Yes    Spiritual:  F - Fely and Belief:  Non Advent  I - Importance:  Yes  C - Community:  Unknown  A - Address in Care:  Unknown           ROS:  Review of Systems   Constitutional:  Positive for activity change, appetite change and fatigue.   Respiratory:  Positive for shortness of breath.    Cardiovascular:  Negative for chest pain.   Neurological:  Positive for weakness.         Past Medical History:   Diagnosis Date    Anticoagulant long-term use     Arthritis     Breast cancer 2014    invasive lobular carcinoma    Cancer of kidney 11/2020    RIGHT KIDNEY CANCER    CHF (congestive heart failure)     Coronary artery disease dx 2005    Depression     Diabetes mellitus     Diastolic heart failure secondary to hypertension     Gout     Hyperlipemia     Hypertension     Hypertrophy of nasal turbinates     Kidney mass 2020    Right    Levoscoliosis     Lung nodule     left    Multiple thyroid nodules     NICOLE (obstructive sleep apnea)     uses C-PAP    Pulmonary hypertension     Severe sepsis 07/01/2024     Past Surgical History:   Procedure Laterality Date    AORTOGRAPHY N/A 12/04/2020    Procedure: Aortogram;  Surgeon: Paul Pedersen MD;  Location: [facility];  Service: Cardiology;  Laterality: N/A;    BREAST SURGERY      BRONCHOSCOPY N/A 7/15/2024    Procedure: BRONCHOSCOPY, FLEXIBLE;  Surgeon: Tanya Beckham MD;  Location: [address];  Service: General;  Laterality: N/A;    CARDIAC CATHETERIZATION  12/2020    CHOLECYSTECTOMY      COLONOSCOPY      multi -last 2014     CORONARY ARTERY BYPASS GRAFT      ESOPHAGOGASTRODUODENOSCOPY      2012     HAND SURGERY Right     INSERTION OF CATHETER N/A 6/23/2024    Procedure: Insertion,catheter;  Surgeon: Meli Griffin MD;  Location: [facility];  Service: Vascular;  Laterality: N/A;  ORIGINAL CATHETER WAS REPOSITIONED.  NO NEW CATHETER WAS PLACED    INSERTION OF TUNNELED CENTRAL VENOUS HEMODIALYSIS CATHETER  "Right 7/11/2024    Procedure: Insertion, Catheter, Central Venous, Hemodialysis;  Surgeon: Hawa Landa MD;  Location: [facility] CATH LAB;  Service: Interventional Nephrology;  Laterality: Right;    INSERTION, CATHETER, DIALYSIS, PERITONEAL N/A 6/4/2024    Procedure: IN Insertion Catheter, Dialysis, Peritoneal - laparoscopic;  Surgeon: Rodriguez Catalan Jr., MD;  Location: [facility];  Service: General;  Laterality: N/A;    LAPAROSCOPIC ROBOT-ASSISTED SURGICAL REMOVAL OF KIDNEY USING DA CHELLE XI Right 03/10/2022    Procedure: XI ROBOTIC NEPHRECTOMY- radical;  Surgeon: Rolando Ramirez MD;  Location: [facility] OR;  Service: Urology;  Laterality: Right;    MASTECTOMY W/ SENTINEL NODE BIOPSY Bilateral 01/21/2015    bilateral "dog ears"    NASAL SINUS SURGERY  2015    Dr Bryant FESS/cauterization turbinate     PARTIAL HYSTERECTOMY  1989    PERCUTANEOUS TRANSLUMINAL BALLOON ANGIOPLASTY OF CORONARY ARTERY  12/04/2020    Procedure: Angioplasty-coronary;  Surgeon: Paul Pedersen MD;  Location: [facility] CATH;  Service: Cardiology;;    PERITONEAL CATHETER REMOVAL N/A 7/15/2024    Procedure: REMOVAL, CATHETER, DIALYSIS, PERITONEAL;  Surgeon: Tanya Beckham MD;  Location: [address];  Service: General;  Laterality: N/A;    REMOVAL OF HEMODIALYSIS CATHETER  7/11/2024    Procedure: REMOVAL, CATHETER, HEMODIALYSIS;  Surgeon: Hawa Landa MD;  Location: [facility] CATH LAB;  Service: Interventional Nephrology;;    RENAL BIOPSY Right     9/20/2021 EJ    TUBAL LIGATION      ULTRASOUND GUIDANCE  12/04/2020    Procedure: Ultrasound Guidance;  Surgeon: Paul Pedersen MD;  Location: [facility] CATH;  Service: Cardiology;;     Family History   Problem Relation Name Age of Onset    Breast cancer Mother      Stroke Father Waqar Sr.     Hypertension Father Waqar Sr.     Hepatitis Brother      Asthma Daughter Nell Taylor     Birth defects Daughter Nell Taylor         Nell's two children has cleft lips    Depression Daughter Nell " Brandon     Drug abuse Bam Taylor     Learning disabilities Bam Taylor     Mental illness Bam Taylor     Breast cancer Maternal Aunt      Glaucoma Sister      Drug abuse Daughter Nell     Macular degeneration Neg Hx      Retinal detachment Neg Hx           Review of patient's allergies indicates:   Allergen Reactions    Percocet [oxycodone-acetaminophen] Itching    Januvia [sitagliptin]     Jardiance [empagliflozin]      Leg cramps    Lipitor [atorvastatin] Other (See Comments)     Severe leg pain    Linaclotide Other (See Comments) and Nausea And Vomiting     Does not remember    Lubiprostone Other (See Comments) and Palpitations     Does not remember       Medications:    Current Facility-Administered Medications:     0.9%  NaCl infusion (CRRT USE ONLY), , Intravenous, Continuous, Paulo Alicea MD    0.9%  NaCl infusion, , Intravenous, Once, Spencer Frank MD, Held at 07/14/24 1500    acetaminophen tablet 650 mg, 650 mg, Oral, Q6H PRN, Zakia Miranda DO, 650 mg at 07/05/24 1550    albuterol sulfate nebulizer solution 2.5 mg, 2.5 mg, Nebulization, Q4H PRN, Zakia Miranda DO    amiodarone tablet 400 mg, 400 mg, Oral, BID, 400 mg at 07/16/24 0900 **FOLLOWED BY** [START ON 7/22/2024] amiodarone tablet 200 mg, 200 mg, Oral, Daily, Medina Clayton, [facility]    aspirin EC tablet 81 mg, 81 mg, Oral, Daily, Zakia Miranda DO, 81 mg at 07/16/24 0900    dextromethorphan-guaiFENesin  mg/5 ml liquid 5 mL, 5 mL, Oral, Q4H PRN, Zakia Miranda DO, 5 mL at 07/03/24 1302    dextrose 10% bolus 125 mL 125 mL, 12.5 g, Intravenous, PRN, Jose Roberto Pruett Jr., MD    dextrose 10% bolus 250 mL 250 mL, 25 g, Intravenous, PRN, Jose Roberto Pruett Jr., MD    gentamicin 0.1 % ointment, , Topical (Top), TID, Jon Hernandez MD, Given at 07/16/24 0900    glucagon (human recombinant) injection 1 mg, 1 mg, Intramuscular, PRN, Miranda, Yanming, DO    glucose chewable tablet 16 g, 16 g, Oral, PRN,  Miranda, Yanming, DO    glucose chewable tablet 24 g, 24 g, Oral, PRN, Zakia Miranda DO    heparin (porcine) injection 1,000 Units, 1,000 Units, Intra-Catheter, PRN, Spencer Frank MD    heparin (porcine) injection 5,000 Units, 5,000 Units, Intra-Catheter, PRN, Paulo Alicea MD    heparin 25,000 units in dextrose 5% (100 units/ml) IV bolus from bag LOW INTENSITY nomogram - [facility], 60 Units/kg (Adjusted), Intravenous, PRN, Medina Clayton AGACNP-BC    heparin 25,000 units in dextrose 5% (100 units/ml) IV bolus from bag LOW INTENSITY nomogram - [facility], 30 Units/kg (Adjusted), Intravenous, PRN, Medina Clayton AGACNP-BC, 2,650 Units at 07/13/24 2308    heparin 25,000 units in dextrose 5% 250 mL (100 units/mL) infusion LOW INTENSITY nomogram - [facility], 0-40 Units/kg/hr (Adjusted), Intravenous, Continuous, Medina Clayton AGACNP-BC, Last Rate: 11.5 mL/hr at 07/16/24 0930, 13 Units/kg/hr at 07/16/24 0930    magnesium sulfate 2g in water 50mL IVPB (premix), 2 g, Intravenous, PRN, Paulo Alicea MD    metoprolol tartrate (LOPRESSOR) tablet 25 mg, 25 mg, Oral, BID, Medina Clayton AGACNP-BC, 25 mg at 07/16/24 0900    midodrine tablet 10 mg, 10 mg, Oral, Daily PRN, Tasneem Ayers NP, 10 mg at 07/14/24 2220    mupirocin 2 % ointment, , Nasal, BID, Kristin Alvarado MD, Given at 07/16/24 0900    naloxone 0.4 mg/mL injection 0.02 mg, 0.02 mg, Intravenous, PRN, Zakia Miranda DO    NORepinephrine bitartrate-D5W 4 mg/250 mL (16 mcg/mL) PERIPHERAL access infusion, 0-0.2 mcg/kg/min, Intravenous, Continuous, Gloria Coker PA-C, Last Rate: 28.1 mL/hr at 07/16/24 0601, 0.06 mcg/kg/min at 07/16/24 0601    ondansetron injection 4 mg, 4 mg, Intravenous, Q6H PRN, Zakia Miranda DO, 4 mg at 07/15/24 1227    simethicone chewable tablet 80 mg, 1 tablet, Oral, TID PRN, Zakia Miranda DO, 80 mg at 07/14/24 1123    sodium chloride 0.9% flush 10 mL, 10 mL, Intravenous, PRN, Hawa Landa MD    sodium phosphate 20.01 mmol in D5W  250 mL IVPB, 20.01 mmol, Intravenous, PRNDeanne Allen, MD    sodium phosphate 30 mmol in D5W 250 mL IVPB, 30 mmol, Intravenous, PRNDeanne Allen, MD    sodium phosphate 39.99 mmol in D5W 250 mL IVPB, 39.99 mmol, Intravenous, PRNDeanne Allen, MD         Objective:      Physical Exam:  Vitals: Temp: 99.3 °F (37.4 °C) (07/16/24 1100)  Pulse: (!) 124 (07/16/24 1200)  Resp: 20 (07/15/24 1006)  BP: 112/64 (07/16/24 1200)  SpO2: 100 % (07/16/24 1200)    Physical Exam  Constitutional:       Appearance: She is ill-appearing.      Interventions: She is sedated and intubated.   Cardiovascular:      Rate and Rhythm: Tachycardia present.   Pulmonary:      Effort: She is intubated.      Breath sounds: Rhonchi present.   Neurological:      General: No focal deficit present.                 Labs:   Creatinine   Date Value Ref Range Status   07/16/2024 5.8 (H) 0.5 - 1.4 mg/dL Final     POC Creatinine   Date Value Ref Range Status   03/12/2021 1.1 0.5 - 1.4 mg/dL Final      Hemoglobin   Date Value Ref Range Status   07/16/2024 10.4 (L) 12.0 - 16.0 g/dL Final      Albumin   Date Value Ref Range Status   07/16/2024 1.8 (L) 3.5 - 5.2 g/dL Final   07/15/2024 1.9 (L) 3.5 - 5.2 g/dL Final   07/15/2024 1.8 (L) 3.5 - 5.2 g/dL Final          Imaging: reviewed    > 50% of 55 min visit spent in chart review, face to face discussion of goals of care,  symptom assessment, coordination of care, charting, and emotional support     Thank you for the opportunity to care for this patient and family.       Liza Pandey, CNS

## 2024-07-16 NOTE — PROGRESS NOTES
[facility] - Cardiac Medical ICU  General Surgery  Progress Note    Subjective:     History of Present Illness:  Juhi Taylor 74-year-old female with morbid obesity, CKD stage 5 s/p PD catheter placement 6/4, RCC s/p right nephrectomy, T2DM, CAD s/p PCI to Lcx and LAD in 11/2020 and HFmREF who is admitted as a transfer from [facility] for higher level of care and further cardiac evaluation. She initially presented on 6/18/24 to [facility] for hypervolemia in the setting of CKD 4-5 for which she had undergone elective PD catheter placement complicated by catheter site leaking. She had a complex hospital course and required placement of tunneled line for HD with volume removal. During her second dialysis session she went into PEA arrest for which she was resuscitated, intubated and transferred to ICU. She was noted to have elevated troponin and subsequent TTE found reduced EF from 40-45% to 20-25% for which coronary angiography was recommended. Her hospital course was also complicated by dialysis line infection, significant bleeding around the catheter site, bradycardia and subsequent atrial fibrillation with RVR. She was ultimately transferred to [facility] for higher level of care and admitted to MICU on 7/8 for urgent dialysis line placement and initiated on HD. She has since stabilized and is approaching step down status for the floor. She did not tolerate PD dialysis following placement.     General surgery now consulted for PD catheter removal. She is alert and oriented and stable for step down.     Post-Op Info:  Procedure(s) (LRB):  REMOVAL, CATHETER, DIALYSIS, PERITONEAL (N/A)  BRONCHOSCOPY, FLEXIBLE (N/A)   1 Day Post-Op     Interval History: Aspirated in the OR yesterday. Remain intubated and transferred to MICU. PD cath removed. One incision left open, healing appropriately so far. Afebrile, and hemodynamically unstable requiring 0.06 levo. Remains intubated AC/VC 40/5.    Medications:  Continuous Infusions:   sodium  chloride 0.9%   Intravenous Continuous        heparin (porcine) in D5W  0-40 Units/kg/hr (Adjusted) Intravenous Continuous 11.5 mL/hr at 07/15/24 0001 13 Units/kg/hr at 07/15/24 0001    NORepinephrine bitartrate-D5W  0-0.2 mcg/kg/min Intravenous Continuous 28.1 mL/hr at 07/16/24 0601 0.06 mcg/kg/min at 07/16/24 0601    propofoL  0-50 mcg/kg/min Intravenous Continuous   Stopped at 07/16/24 0552     Scheduled Meds:   0.9% NaCl   Intravenous Once    amiodarone  400 mg Oral BID    Followed by    [START ON 7/22/2024] amiodarone  200 mg Oral Daily    aspirin  81 mg Oral Daily    famotidine (PF)  20 mg Intravenous Daily    gentamicin   Topical (Top) TID    metoprolol tartrate  25 mg Oral BID    mupirocin   Nasal BID     PRN Meds:  Current Facility-Administered Medications:     acetaminophen, 650 mg, Oral, Q6H PRN    albuterol sulfate, 2.5 mg, Nebulization, Q4H PRN    dextromethorphan-guaiFENesin  mg/5 ml, 5 mL, Oral, Q4H PRN    dextrose 10%, 12.5 g, Intravenous, PRN    dextrose 10%, 25 g, Intravenous, PRN    glucagon (human recombinant), 1 mg, Intramuscular, PRN    glucose, 16 g, Oral, PRN    glucose, 24 g, Oral, PRN    heparin (porcine), 1,000 Units, Intra-Catheter, PRN    heparin (porcine), 5,000 Units, Intra-Catheter, PRN    heparin (PORCINE), 60 Units/kg (Adjusted), Intravenous, PRN    heparin (PORCINE), 30 Units/kg (Adjusted), Intravenous, PRN    magnesium sulfate IVPB, 2 g, Intravenous, PRN    midodrine, 10 mg, Oral, Daily PRN    naloxone, 0.02 mg, Intravenous, PRN    ondansetron, 4 mg, Intravenous, Q6H PRN    simethicone, 1 tablet, Oral, TID PRN    sodium chloride 0.9%, 10 mL, Intravenous, PRN    sodium phosphate 20.01 mmol in D5W 250 mL IVPB, 20.01 mmol, Intravenous, PRN    sodium phosphate 30 mmol in D5W 250 mL IVPB, 30 mmol, Intravenous, PRN    sodium phosphate 39.99 mmol in D5W 250 mL IVPB, 39.99 mmol, Intravenous, PRN     Review of patient's allergies indicates:   Allergen Reactions    Percocet  [oxycodone-acetaminophen] Itching    Januvia [sitagliptin]     Jardiance [empagliflozin]      Leg cramps    Lipitor [atorvastatin] Other (See Comments)     Severe leg pain    Linaclotide Other (See Comments) and Nausea And Vomiting     Does not remember    Lubiprostone Other (See Comments) and Palpitations     Does not remember     Objective:     Vital Signs (Most Recent):  Temp: 97.8 °F (36.6 °C) (07/16/24 0301)  Pulse: 110 (07/16/24 0724)  Resp: 20 (07/15/24 1006)  BP: (!) 80/50 (07/15/24 1400)  SpO2: 99 % (07/16/24 0724) Vital Signs (24h Range):  Temp:  [97.8 °F (36.6 °C)-97.9 °F (36.6 °C)] 97.8 °F (36.6 °C)  Pulse:  [] 110  Resp:  [20] 20  SpO2:  [94 %-100 %] 99 %  BP: ()/(50-67) 80/50  Arterial Line BP: (110-125)/(55-66) 110/58     Weight: 128.5 kg (283 lb 4.7 oz)  Body mass index is 44.37 kg/m².    Intake/Output - Last 3 Shifts         07/14 0700  07/15 0659 07/15 0700 07/16 0659 07/16 0700 07/17 0659    P.O. 200      I.V. (mL/kg) 416.9 (3.2) 725.5 (5.6)     Other 400      IV Piggyback 26.5 3     Total Intake(mL/kg) 1043.4 (8.1) 728.5 (5.7)     Urine (mL/kg/hr)  0 (0)     Other 2700      Stool 0      Total Output 2700 0     Net -1656.6 +728.5            Urine Occurrence 1 x      Stool Occurrence 3 x               Physical Exam  Vitals and nursing note reviewed.   Constitutional:       General: She is not in acute distress.     Appearance: She is obese. She is ill-appearing. She is not diaphoretic.      Comments: Intubated, sedated   HENT:      Head: Normocephalic and atraumatic.      Mouth/Throat:      Mouth: Mucous membranes are moist.      Pharynx: Oropharynx is clear.   Neck:      Comments: L trialysis cath in place  Cardiovascular:      Rate and Rhythm: Normal rate.   Pulmonary:      Effort: No respiratory distress.      Comments: Intubated and sedated  Abdominal:      General: There is no distension.      Palpations: Abdomen is soft.      Tenderness: There is no guarding or rebound.       Comments: Large pannus; PD interval removal, incision left open re-packed   Musculoskeletal:         General: No deformity.   Skin:     General: Skin is warm and dry.          Significant Labs:  I have reviewed all pertinent lab results within the past 24 hours.    Significant Diagnostics:  I have reviewed all pertinent imaging results/findings within the past 24 hours.  Assessment/Plan:   * ESRD (end stage renal disease)  Patient is a 74 year old female with above history now tolerating HD, consult to gen surg for PD catheter removal. S/p PD cath removal and incision left open    - diet per primary team once extubated  - Okay for hep gtt  - Incision that was left open needs re-packing wet to dry every day  - Remainder of care per primary team  - Please contact general surgery with any questions, concerns, or clinical status changes. We will sign off.    Tio Solis MD  General Surgery  [facility] - Cardiac Medical ICU

## 2024-07-16 NOTE — ASSESSMENT & PLAN NOTE
Patient with Hypoxic Respiratory failure which is Acute.  she is not on home oxygen. Supplemental oxygen was provided and noted- Vent Mode: Spont  Oxygen Concentration (%):  [] 30  Resp Rate Total:  [18 br/min-23 br/min] 21 br/min  Vt Set:  [420 mL] 420 mL  PEEP/CPAP:  [5 cmH20] 5 cmH20  Pressure Support:  [5 cmH20] 5 cmH20  Mean Airway Pressure:  [7.2 zwK88-49 cmH20] 7.8 cmH20    .   Signs/symptoms of respiratory failure include- respiratory distress. Contributing diagnoses includes - Aspiration Labs and images were reviewed. Patient Has recent ABG, which has been reviewed. Will treat underlying causes and adjust management of respiratory failure as follows-     -- Extubated this am 07/16  -- Pulmonary toilet

## 2024-07-16 NOTE — PLAN OF CARE
MICU DAILY GOALS     Family/Goals of care/Code Status   Code Status: Full Code    24H Vital Sign Range  Temp:  [97.8 °F (36.6 °C)-98.1 °F (36.7 °C)]   Pulse:  []   Resp:  [20-25]   BP: ()/(50-71)   SpO2:  [92 %-100 %]   Arterial Line BP: (110-125)/(55-66)      Shift Events (include procedures and significant events)   Pt remains in the ICU intubated. Sedation have been off since 0530. SAT SBT passed. Plan for extubation. TPA administered @ 0630. Plan for dialysis today. Administered medications as prescribed.      AWAKE RASS: Goal - RASS Goal: 0-->alert and calm  Actual - RASS (Jha Agitation-Sedation Scale): alert and calm    Restraint necessity: Not necessary   BREATHE SBT: Pass    Coordinate A & B, analgesics/sedatives Pain: managed   SAT: Pass   Delirium CAM-ICU: Overall CAM-ICU: Positive   Early(intubated/ Progressive (non-intubated) Mobility MOVE Screen (INTUBATED ONLY): Pass    Activity: Activity Management: Rolling - L1   Feeding/Nutrition Diet order: Diet/Nutrition Received: NPO, Specialty Diet/Nutrition Received: renal diet   Thrombus DVT prophylaxis: VTE Required Core Measure: Pharmacological prophylaxis initiated/maintained   HOB Elevation Head of Bed (HOB) Positioning: HOB at 30-45 degrees   Ulcer Prophylaxis GI: yes   Glucose control managed Glycemic Management: blood glucose monitored   Skin Skin assessed during: Daily Assessment    Sacrum intact/not altered? Yes  Heels intact/not altered? Yes  Surgical wound? Yes    CHECK ONE!   (no altered skin or altered skin) and sub boxes:  [] No Altered Skin Integrity Present    []Prevention Measures Documented    [x] Altered Skin Integrity Present or Discovered   [x] LDA present in EPIC, daily doc completed              [] LDA added if not in EPIC (describe wound).                    When describing wound, do not stage, use descriptive words only.    [] Wound Image Taken (required on admit,                   transfer/discharge and every  Tuesday)    Wound Care Consulted? Yes    4 EYES:  Attending Nurse (1st set of eyes): NEENA Ayala    Second RN/Staff Member (2nd set of eyes): NEENA Ferris   Bowel Function no issues    Indwelling Catheter Necessity      [REMOVED] Trialysis (Dialysis) Catheter 07/08/24 2210 left internal jugular-Line Necessity Review: CRRT/HD       Hemodialysis Catheter 07/11/24 1219 left subclavian-Line Necessity Review: CRRT/HD      YES      De-escalation Antibiotics Yes        VS and assessment per flow sheet, patient progressing towards goals as tolerated, plan of care reviewed with  Juhi Taylor and sister , all concerns addressed on my shift.

## 2024-07-16 NOTE — PLAN OF CARE
Problem: Occupational Therapy  Goal: Occupational Therapy Goal  Description: Goals to be met by: 08/04/24     Patient will increase functional independence with ADLs by performing:    UE Dressing with Modified Rowan.  LE Dressing with Minimal Assistance.  Grooming while standing at sink with Stand-by Assistance.  Toileting from bedside commode with Minimal Assistance for hygiene and clothing management.   Toilet transfer to bedside commode with Supervision.    DME:  Tub transfer bench is required for pt to return to t/s use with shower chair at present unsuitable with need for mobiltiy greater than pt can safely complete at present.     Patient has a mobility limitation that significantly impairs their ability to participate in one or more mobility related activities of daily living, including toileting. This deficit can be resolved by using a bedside commode. Patient demonstrates mobility limitations that will cause them to be confined to one room at home without bathroom access for up to 30 days. Using a bedside commode will greatly improve the patient's ability to participate in MRADLs.     Ambulation needs TBD on progress.     Re-evaluated pt today with goals remaining appropriate. Continue OT as tolerated.  Sophie Lucia OT  7/16/2024          Outcome: Progressing

## 2024-07-16 NOTE — SUBJECTIVE & OBJECTIVE
Interval History:  Juhi had a bad day yesterday, had an aspiration event, got intubated, later was extubated in the morning, seemed to be mentating well.     Review of patient's allergies indicates:   Allergen Reactions    Percocet [oxycodone-acetaminophen] Itching    Januvia [sitagliptin]     Jardiance [empagliflozin]      Leg cramps    Lipitor [atorvastatin] Other (See Comments)     Severe leg pain    Linaclotide Other (See Comments) and Nausea And Vomiting     Does not remember    Lubiprostone Other (See Comments) and Palpitations     Does not remember     Current Facility-Administered Medications   Medication Frequency    0.9%  NaCl infusion (CRRT USE ONLY) Continuous    0.9%  NaCl infusion Once    acetaminophen tablet 650 mg Q6H PRN    albuterol sulfate nebulizer solution 2.5 mg Q4H PRN    amiodarone tablet 400 mg BID    Followed by    [START ON 7/22/2024] amiodarone tablet 200 mg Daily    aspirin EC tablet 81 mg Daily    dextromethorphan-guaiFENesin  mg/5 ml liquid 5 mL Q4H PRN    dextrose 10% bolus 125 mL 125 mL PRN    dextrose 10% bolus 250 mL 250 mL PRN    gentamicin 0.1 % ointment TID    glucagon (human recombinant) injection 1 mg PRN    glucose chewable tablet 16 g PRN    glucose chewable tablet 24 g PRN    heparin (porcine) injection 1,000 Units PRN    heparin (porcine) injection 5,000 Units PRN    heparin 25,000 units in dextrose 5% (100 units/ml) IV bolus from bag LOW INTENSITY nomogram - [facility] PRN    heparin 25,000 units in dextrose 5% (100 units/ml) IV bolus from bag LOW INTENSITY nomogram - [facility] PRN    heparin 25,000 units in dextrose 5% 250 mL (100 units/mL) infusion LOW INTENSITY nomogram - [facility] Continuous    magnesium sulfate 2g in water 50mL IVPB (premix) PRN    metoprolol tartrate (LOPRESSOR) tablet 25 mg BID    midodrine tablet 10 mg Daily PRN    mupirocin 2 % ointment BID    naloxone 0.4 mg/mL injection 0.02 mg PRN    NORepinephrine bitartrate-D5W 4 mg/250 mL (16 mcg/mL) PERIPHERAL  access infusion Continuous    ondansetron injection 4 mg Q6H PRN    simethicone chewable tablet 80 mg TID PRN    sodium chloride 0.9% flush 10 mL PRN    sodium phosphate 20.01 mmol in D5W 250 mL IVPB PRN    sodium phosphate 30 mmol in D5W 250 mL IVPB PRN    sodium phosphate 39.99 mmol in D5W 250 mL IVPB PRN       Objective:     Vital Signs (Most Recent):  Temp: 97.8 °F (36.6 °C) (07/16/24 0301)  Pulse: 107 (07/16/24 1015)  Resp: 20 (07/15/24 1006)  BP: (!) 109/53 (07/16/24 1015)  SpO2: 100 % (07/16/24 1015) Vital Signs (24h Range):  Temp:  [97.8 °F (36.6 °C)-97.9 °F (36.6 °C)] 97.8 °F (36.6 °C)  Pulse:  [] 107  SpO2:  [94 %-100 %] 100 %  BP: ()/(50-58) 109/53  Arterial Line BP: ()/(47-66) 94/47     Weight: 128.5 kg (283 lb 4.7 oz) (07/15/24 1006)  Body mass index is 44.37 kg/m².  Body surface area is 2.46 meters squared.    I/O last 3 completed shifts:  In: 1462 [P.O.:200; I.V.:859; Other:400; IV Piggyback:3]  Out: 2700 [Other:2700]     Physical Exam  Constitutional:       General: She is not in acute distress.     Appearance: Normal appearance. She is obese.   HENT:      Head: Normocephalic and atraumatic.      Mouth/Throat:      Mouth: Mucous membranes are moist.      Pharynx: Oropharynx is clear.   Cardiovascular:      Rate and Rhythm: Normal rate and regular rhythm.      Heart sounds: Normal heart sounds.   Pulmonary:      Effort: Pulmonary effort is normal.      Breath sounds: Normal breath sounds.   Abdominal:      Palpations: Abdomen is soft.   Musculoskeletal:      Right lower leg: Edema (2+) present.      Left lower leg: Edema (2+) present.      Comments: Small amount of weeping in lower extremities.    Skin:     General: Skin is warm and dry.   Neurological:      General: No focal deficit present.      Mental Status: She is alert. She is disoriented.      Motor: Weakness present.          Significant Labs:  All labs within the past 24 hours have been reviewed.     Significant  Imaging:  All imaging with the past 24 hours have been reviewed.

## 2024-07-16 NOTE — ASSESSMENT & PLAN NOTE
Pt previously on PD. Unable to remove enough volume with PD catheter. Initially upgraded to MICU for emergent trialysis placement and CRRT. Now with tunneled dialysis catheter. Tolerated session of HD on 7/12 without complications.     - Nephrology following, appreciate recs  - Strict I/Os  - Renally dose meds  - Avoid nephrotoxic agents  - PD catheter removed 07/15  - Tolerated HD session overnight 07/15

## 2024-07-16 NOTE — PROGRESS NOTES
[facility]  Critical Care Medicine  Progress Note    Patient Name: Juhi Taylor  MRN: [id number]  Admission Date: 7/3/2024  Hospital Length of Stay: 13 days  Code Status: Full Code  Attending Provider: Kristin Alvarado MD  Primary Care Provider: Tony Sadler MD   Principal Problem: ESRD (end stage renal disease)    Subjective:     HPI:  Juhi Taylor is a 74-year-old female with a past medical history of CKD stage 5 with recent PD catheter placement on 6/4, RCC s/p right nephrectomy, T2DM, obesity, CAD s/p PCI to Lcx and LAD in 11/2020 and HFmREF who is admitted as a transfer from [facility] for higher level of care and further cardiac evaluation. Nephrology consulted for hemodialysis. She initially presented on 6/18/24 to [facility] for hypervolemia in the setting of CKD 4-5 for which she had undergone elective PD catheter placement complicated by catheter site leaking. She had a complex hospital course, was initially placed on furosemide gtt without significant improvement and later underwent placement of tunneled line for HD with volume removal. During her second dialysis session she went into PEA arrest for which she was resuscitated, intubated and transferred to ICU. She was noted to have elevated troponin and subsequent TTE found reduced EF from 40-45% to 20-25% for which coronary angiography was recommended. Her hospital course was also complicated by dialysis line infection, significant bleeding around the catheter site, bradycardia and subsequent atrial fibrillation with RVR. Given the catheter site bleeding, anticoagulation was temporarily held. She has reported allergy to amiodarone, was initially placed on diltiazem for atrial fibrillation despite reduced EF. She discussed GOC at outside hospital where it appears that she lacked capacity per note and decided to pursue higher level of care at [facility]. At that time, gen surgery was planning to place a left tunneled catheter due to  concerns of the right side not being suitable     Upon admit, patient appeared drowsy but conversant. Daughter reports that she typically gets lethargic 2/2 to the volume overload due to not receiving dialysis. Patient reported b/l left lower extremity pain. Daughter concerned about her not having dialysis in 4 days and wants a session today.     Pt initially refusing dialysis. Seen by nephrology. VBG 7.220/33.4/34/13.7/-14. Critical care medicine consulted for emergent trialysis line placement and dialysis.     Hospital/ICU Course:  Ms. Taylor was admitted to MICU on 7/8 for urgent dialysis line placement and initiated of HD. Patient received short run of CRRT overnight without issue. Encephalopathy improving with continued CRRT treatments. Persistent AFib with RVR, received Digoxin loading dose (renally dosed), restarted PO Carvedilol. Patient remains in persistent AFib RVR despite therapeutic digoxin level and uptitration of Carvedilol levels, started on Amiodarone infusion overnight. Transitioned to amio PO, started on heparin gtt for afib AC. Tolerated HD well overnight without complications and less encephalopathic upon exam. Planning for gen surg to remove PD catheter. Upon induction pt vomited copious amounts of bile, was intubated. PD cathter removed successfully. Extubated to NC 07/16.     Interval History/Significant Events: Passed SAT/SBT. Extubated to nasal cannula.     Review of Systems   Unable to perform ROS: Age     Objective:     Vital Signs (Most Recent):  Temp: 97.9 °F (36.6 °C) (07/15/24 1006)  Pulse: 104 (07/15/24 1006)  Resp: 20 (07/15/24 1006)  BP: 106/67 (07/15/24 1006)  SpO2: 95 % (07/15/24 1006) Vital Signs (24h Range):  Temp:  [97.5 °F (36.4 °C)-99.4 °F (37.4 °C)] 97.9 °F (36.6 °C)  Pulse:  [] 104  Resp:  [17-33] 20  SpO2:  [88 %-97 %] 95 %  BP: ()/(50-85) 106/67   Weight: 128.5 kg (283 lb 4.7 oz)  Body mass index is 44.37 kg/m².      Intake/Output Summary (Last 24 hours)  at 7/15/2024 1323  Last data filed at 7/15/2024 0601  Gross per 24 hour   Intake 791.14 ml   Output 2700 ml   Net -1908.86 ml          Physical Exam  Vitals and nursing note reviewed.   Constitutional:       Appearance: She is obese. She is ill-appearing.   Eyes:      Pupils: Pupils are equal, round, and reactive to light.   Cardiovascular:      Rate and Rhythm: Normal rate and regular rhythm.      Pulses: Normal pulses.      Heart sounds: Normal heart sounds.   Pulmonary:      Comments: Decreased at the bases  Abdominal:      General: Bowel sounds are normal.      Palpations: Abdomen is soft.   Musculoskeletal:         General: Normal range of motion.   Skin:     General: Skin is warm and dry.      Findings: Bruising present.   Neurological:      Mental Status: She is oriented to person, place, and time.            Vents:  Oxygen Concentration (%): 28 (07/05/24 2320)  Lines/Drains/Airways       Central Venous Catheter Line  Duration                  Hemodialysis Catheter 07/11/24 1219 left subclavian 4 days              Airway  Duration                  Airway - Non-Surgical 07/15/24 1216 <1 day              Peripheral Intravenous Line  Duration                  Midline Catheter - Single Lumen 07/12/24 0430 Right basilic vein (medial side of arm) 3 days         Peripheral IV - Single Lumen 07/12/24 1700 20 G 1 3/4 in Anterior;Distal;Right Forearm 2 days                  Significant Labs:    CBC/Anemia Profile:  Recent Labs   Lab 07/14/24  0512 07/15/24  0327   WBC 17.77* 19.38*   HGB 9.9* 10.1*   HCT 32.3* 31.5*   * 151   MCV 85 81*   RDW 21.7* 21.2*        Chemistries:  Recent Labs   Lab 07/14/24  0512 07/15/24  0327   * 132*   K 4.3 4.3    101   CO2 22* 20*   BUN 45* 36*   CREATININE 5.7* 4.7*   CALCIUM 8.2* 8.1*   ALBUMIN 1.9* 1.8*   PROT 4.9* 4.8*   BILITOT 0.9 0.9   ALKPHOS 156* 133   ALT 43 32   AST 30 22   MG 2.2 2.1   PHOS 5.6* 4.7*       All pertinent labs within the past 24 hours have  been reviewed.    Significant Imaging:  I have reviewed all pertinent imaging results/findings within the past 24 hours.    ABG  Recent Labs   Lab 07/15/24  1557   PH 7.314*   PO2 92   PCO2 38.3   HCO3 19.5*   BE -7*     Assessment/Plan:     Pulmonary  Acute respiratory failure with hypoxia  Patient with Hypoxic Respiratory failure which is Acute.  she is not on home oxygen. Supplemental oxygen was provided and noted- Vent Mode: Spont  Oxygen Concentration (%):  [] 30  Resp Rate Total:  [18 br/min-23 br/min] 21 br/min  Vt Set:  [420 mL] 420 mL  PEEP/CPAP:  [5 cmH20] 5 cmH20  Pressure Support:  [5 cmH20] 5 cmH20  Mean Airway Pressure:  [7.2 vpI60-07 cmH20] 7.8 cmH20    .   Signs/symptoms of respiratory failure include- respiratory distress. Contributing diagnoses includes - Aspiration Labs and images were reviewed. Patient Has recent ABG, which has been reviewed. Will treat underlying causes and adjust management of respiratory failure as follows-     -- Extubated this am 07/16  -- Pulmonary toilet    Cardiac/Vascular  Atrial fibrillation with RVR  AFib with RVR appears new following cardiac arrest at OSH. Per chart review, AFib was difficult to control. Patient endorses amiodarone reaction with itching, rash, and oral burning. Refused Sotalol, DCCV, and AICD placement at OSH. Was placed on Diltizem infusion and transitioned to PO metoprolol. Amio gtt restarted overnight 7/11 s/t uncontrolled afib rvr. Transitioned to PO amio.    - D/C heparin gtt  - Restart apixaban  - Daily CMP  - Keep K>4, Mag >2    Acute on chronic combined systolic and diastolic heart failure  Echo    Left Ventricle: The left ventricle is moderately dilated. Normal wall thickness. Global hypokinesis present. There is severely reduced systolic function with a visually estimated ejection fraction of 20 - 25%.    Right Ventricle: Mild right ventricular enlargement. Wall thickness is normal. Right ventricle wall motion has global hypokinesis.  Systolic function is moderately reduced.    Left Atrium: Left atrium is severely dilated.    Right Atrium: Right atrium is severely dilated.    Aortic Valve: The aortic valve is a trileaflet valve. There is mild aortic valve sclerosis.    Mitral Valve: There is mild regurgitation.    Tricuspid Valve: There is severe regurgitation.    Pulmonary Artery: There is moderate pulmonary hypertension. The estimated pulmonary artery systolic pressure is 61 mmHg.    IVC/SVC: Elevated venous pressure at 15 mmHg.    Pericardium: There is a trivial circumferential effusion. No indication of cardiac tamponade.    - Will require interventional cardiology for LHC vs PET for reduced EF once euvolemic  - LifeVest prior to discharge  - Continue Entresto 24-26mg BID per cardiology recs    Essential hypertension  - Changed home Carvedilol to Metoprolol 25mg BID per cardiology recs    Renal/  * ESRD (end stage renal disease)  Pt previously on PD. Unable to remove enough volume with PD catheter. Initially upgraded to MICU for emergent trialysis placement and CRRT. Now with tunneled dialysis catheter. Tolerated session of HD on 7/12 without complications.     - Nephrology following, appreciate recs  - Strict I/Os  - Renally dose meds  - Avoid nephrotoxic agents  - PD catheter removed 07/15  - Tolerated HD session overnight 07/15    Endocrine  Type 2 diabetes mellitus with microalbuminuria, without long-term current use of insulin  Latest A1C 6.8; on Metformin at home    - POC glucose  - Hypoglycemia protocol    Palliative Care  Palliative care encounter  - Palliative care following, continued GOC with family    ACP (advance care planning)  - Palliative care consulted, appreciate their assistance in clarification of GOC/POC with patient and family        Critical Care Time: 45 minutes  Critical secondary to Patient has a condition that poses threat to life and bodily function: Hypoxic respiratory failure      Critical care was time spent  personally by me on the following activities: development of treatment plan with patient or surrogate and bedside caregivers, discussions with consultants, evaluation of patient's response to treatment, examination of patient, ordering and performing treatments and interventions, ordering and review of laboratory studies, ordering and review of radiographic studies, pulse oximetry, re-evaluation of patient's condition. This critical care time did not overlap with that of any other provider or involve time for any procedures.     Connie Mar DNP  Critical Care Medicine  [facility] - Cardiac Medical ICU

## 2024-07-16 NOTE — ANESTHESIA POSTPROCEDURE EVALUATION
Anesthesia Post Evaluation    Patient: Juhi Taylor    Procedure(s) Performed: Procedure(s) (LRB):  REMOVAL, CATHETER, DIALYSIS, PERITONEAL (N/A)  BRONCHOSCOPY, FLEXIBLE (N/A)    Final Anesthesia Type: general      Patient location during evaluation: ICU  Patient participation: Yes- Able to Participate  Level of consciousness: awake and alert and oriented  Post-procedure vital signs: reviewed and stable  Pain management: adequate  Airway patency: patent    PONV status at discharge: No PONV  Anesthetic complications: no      Cardiovascular status: stable  Respiratory status: unassisted, spontaneous ventilation and room air  Hydration status: euvolemic  Follow-up not needed.              Vitals Value Taken Time   /93 07/16/24 1329   Temp 37.4 °C (99.3 °F) 07/16/24 1100   Pulse 136 07/16/24 1334   Resp  07/16/24 1334   SpO2 96 % 07/16/24 1334   Vitals shown include unfiled device data.      No case tracking events are documented in the log.      Pain/Kg Score: No data recorded

## 2024-07-16 NOTE — ASSESSMENT & PLAN NOTE
AFib with RVR appears new following cardiac arrest at OSH. Per chart review, AFib was difficult to control. Patient endorses amiodarone reaction with itching, rash, and oral burning. Refused Sotalol, DCCV, and AICD placement at OSH. Was placed on Diltizem infusion and transitioned to PO metoprolol. Amio gtt restarted overnight 7/11 s/t uncontrolled afib rvr. Transitioned to PO amio.    - D/C heparin gtt  - Restart apixaban  - Daily CMP  - Keep K>4, Mag >2

## 2024-07-16 NOTE — PROGRESS NOTES
[facility]  Nephrology  Progress Note    Patient Name: Juhi Taylor  MRN: [id number]  Admission Date: 7/3/2024  Hospital Length of Stay: 13 days  Attending Provider: Kristin Alvarado MD   Primary Care Physician: Tony Sadler MD  Principal Problem:ESRD (end stage renal disease)    Subjective:     HPI: 74-year-old female with prior history of CKD stage 5 with recent PD catheter placement on 6/4, RCC s/p right nephrectomy, T2DM, obesity, CAD s/p PCI to Lcx and LAD in 11/2020 and HFmREF who is admitted as a transfer from [facility] for higher level of care and further cardiac evaluation. Nephrology consulted for hemodialysis.     She initially presented on 6/18/24 to [facility] for hypervolemia in the setting of CKD 4-5 for which she had undergone elective PD catheter placement complicated by catheter site leaking. She had a complex hospital course, was initially placed on furosemide gtt without significant improvement and later underwent placement of tunneled line for HD with volume removal. During her second dialysis session she went into PEA arrest for which she was resuscitated, intubated and transferred to ICU. She was noted to have elevated troponin and subsequent TTE found reduced EF from 40-45% to 20-25% for which coronary angiography was recommended. Her hospital course was also complicated by dialysis line infection, significant bleeding around the catheter site, bradycardia and subsequent atrial fibrillation with RVR. Given the catheter site bleeding, anticoagulation was temporarily held. She has reported allergy to amiodarone, was initially placed on diltiazem for atrial fibrillation despite reduced EF. She discussed GOC at outside hospital where it appears that she lacked capacity per note and decided to pursue higher level of care at [facility]. At that time, gen surgery was planning to place a left tunneled catheter due to concerns of the right side not being suitable    Upon admit, patient  appeared drowsy but conversant. Daughter reports that she typically gets lethargic 2/2 to the volume overload due to not receiving dialysis. Patient reported b/l left lower extremity pain. Daughter concerned about her not having dialysis in 4 days and wants a session today.     Interval History:  Juhi had a bad day yesterday, had an aspiration event, got intubated, later was extubated in the morning, seemed to be mentating well.     Review of patient's allergies indicates:   Allergen Reactions    Percocet [oxycodone-acetaminophen] Itching    Januvia [sitagliptin]     Jardiance [empagliflozin]      Leg cramps    Lipitor [atorvastatin] Other (See Comments)     Severe leg pain    Linaclotide Other (See Comments) and Nausea And Vomiting     Does not remember    Lubiprostone Other (See Comments) and Palpitations     Does not remember     Current Facility-Administered Medications   Medication Frequency    0.9%  NaCl infusion (CRRT USE ONLY) Continuous    0.9%  NaCl infusion Once    acetaminophen tablet 650 mg Q6H PRN    albuterol sulfate nebulizer solution 2.5 mg Q4H PRN    amiodarone tablet 400 mg BID    Followed by    [START ON 7/22/2024] amiodarone tablet 200 mg Daily    aspirin EC tablet 81 mg Daily    dextromethorphan-guaiFENesin  mg/5 ml liquid 5 mL Q4H PRN    dextrose 10% bolus 125 mL 125 mL PRN    dextrose 10% bolus 250 mL 250 mL PRN    gentamicin 0.1 % ointment TID    glucagon (human recombinant) injection 1 mg PRN    glucose chewable tablet 16 g PRN    glucose chewable tablet 24 g PRN    heparin (porcine) injection 1,000 Units PRN    heparin (porcine) injection 5,000 Units PRN    heparin 25,000 units in dextrose 5% (100 units/ml) IV bolus from bag LOW INTENSITY nomogram - [facility] PRN    heparin 25,000 units in dextrose 5% (100 units/ml) IV bolus from bag LOW INTENSITY nomogram - [facility] PRN    heparin 25,000 units in dextrose 5% 250 mL (100 units/mL) infusion LOW INTENSITY nomogram - [facility] Continuous     magnesium sulfate 2g in water 50mL IVPB (premix) PRN    metoprolol tartrate (LOPRESSOR) tablet 25 mg BID    midodrine tablet 10 mg Daily PRN    mupirocin 2 % ointment BID    naloxone 0.4 mg/mL injection 0.02 mg PRN    NORepinephrine bitartrate-D5W 4 mg/250 mL (16 mcg/mL) PERIPHERAL access infusion Continuous    ondansetron injection 4 mg Q6H PRN    simethicone chewable tablet 80 mg TID PRN    sodium chloride 0.9% flush 10 mL PRN    sodium phosphate 20.01 mmol in D5W 250 mL IVPB PRN    sodium phosphate 30 mmol in D5W 250 mL IVPB PRN    sodium phosphate 39.99 mmol in D5W 250 mL IVPB PRN       Objective:     Vital Signs (Most Recent):  Temp: 97.8 °F (36.6 °C) (07/16/24 0301)  Pulse: 107 (07/16/24 1015)  Resp: 20 (07/15/24 1006)  BP: (!) 109/53 (07/16/24 1015)  SpO2: 100 % (07/16/24 1015) Vital Signs (24h Range):  Temp:  [97.8 °F (36.6 °C)-97.9 °F (36.6 °C)] 97.8 °F (36.6 °C)  Pulse:  [] 107  SpO2:  [94 %-100 %] 100 %  BP: ()/(50-58) 109/53  Arterial Line BP: ()/(47-66) 94/47     Weight: 128.5 kg (283 lb 4.7 oz) (07/15/24 1006)  Body mass index is 44.37 kg/m².  Body surface area is 2.46 meters squared.    I/O last 3 completed shifts:  In: 1462 [P.O.:200; I.V.:859; Other:400; IV Piggyback:3]  Out: 2700 [Other:2700]     Physical Exam  Constitutional:       General: She is not in acute distress.     Appearance: Normal appearance. She is obese.   HENT:      Head: Normocephalic and atraumatic.      Mouth/Throat:      Mouth: Mucous membranes are moist.      Pharynx: Oropharynx is clear.   Cardiovascular:      Rate and Rhythm: Normal rate and regular rhythm.      Heart sounds: Normal heart sounds.   Pulmonary:      Effort: Pulmonary effort is normal.      Breath sounds: Normal breath sounds.   Abdominal:      Palpations: Abdomen is soft.   Musculoskeletal:      Right lower leg: Edema (2+) present.      Left lower leg: Edema (2+) present.      Comments: Small amount of weeping in lower extremities.    Skin:      General: Skin is warm and dry.   Neurological:      General: No focal deficit present.      Mental Status: She is alert. She is disoriented.      Motor: Weakness present.          Significant Labs:  All labs within the past 24 hours have been reviewed.     Significant Imaging:  All imaging with the past 24 hours have been reviewed.  Assessment/Plan:     Renal/  * ESRD (end stage renal disease)  Nephrology consulted for inpatient-HD management  admitted as a transfer for acute HFrEF in setting of CKD5 and recent PEA arrest during dialysis. Recent PD catheter placement on 6/4 but concerned for a possibly leak from cath. Unable to initiate dialysis for 2 weeks PTA to OSH due to insurance issues. Hospital course complicated by infected tunneled cath removed on 6/30. Transferred here for higher level of care. Initial plan at OSH was to replace tunneled catheter on the left side due to concerns to that right side may not by suitable. PD was initiated here in hospital however we were unable to remove significant amounts of fluid, At this time the daughter wishes to proceed with HD.     Plan/Recommendations  - Off pressors after extubation. Plan for SLED dialysis today, 4 hours.   - Tunnel cath placed 7/11.  - Still having clotting issues with HD, Cath flow,TPA, heparin.   - Per interventional nephrology recommendation will need 5000IU/ml Heparin lock in each port with each dialysis session.   - use 1500 IU Heparin with the priming of the machine   - Renally adjust medications  - Pre and Post-HD weights   - Continue to monitor intake and output           Thank you for your consult. I will follow-up with patient. Please contact us if you have any additional questions.    Paulo Alicea MD  Nephrology  [facility] - Cardiac Medical ICU

## 2024-07-16 NOTE — PT/OT/SLP RE-EVAL
Physical Therapy Co-Reevaluation and Co-Treatment  Patient Name:  Juhi Taylor   MRN:  [id number]  Admit Date: 7/3/2024  Admitting Diagnosis:  ESRD (end stage renal disease)   Length of Stay: 13 days  Recent Surgery: Procedure(s) (LRB):  REMOVAL, CATHETER, DIALYSIS, PERITONEAL (N/A)  BRONCHOSCOPY, FLEXIBLE (N/A) 1 Day Post-Op    Hospital Course:  7/3/2024: Admit date  7/4/2024: PT initial evaluation  Pt presents today for reevaluation due to change in medical status. On 7/15 pt stepped down to ICU and went to OR for peritoneal dialysis port removal. During procedure pt had aspiration even requiring intubation and transfer back to MICU.     Please refer to PT initial evaluation for PLOF and social history.  Recommendations:     Discharge Recommendations:  Moderate Intensity Therapy  Discharge Equipment Recommendations: bedside commode, wheelchair, walker, rolling, hospital bed   Barriers to discharge:  assist needed    Appropriate transfer level with nursing staff: bed level therex    Plan:     During this hospitalization, patient to be seen 4 x/week to address the identified rehab impairments via gait training, therapeutic activities, therapeutic exercises, neuromuscular re-education and progress towards the established goals.  Plan of Care Expires:  08/15/24  Plan of Care Reviewed with: patient, family    Assessment     Juhi Taylor is a 74 y.o. female admitted with a medical diagnosis of ESRD (end stage renal disease). Pt tolerated reevaluation well today. Pt presents alert and cooperative and eager to participate in therapy session today. She requires substantial assist to complete all mobility at this time due to her diffuse weakness and generalized debility from her medical condition, prolonged hospitalization, and decreased mobility for ~1 month at home PTA. She was able to tolerate ~6 minutes EOB today with increased assist needed to prevent LOB during dynamic tasks due to impaired reactive  balance strategies 2/2 core weakness. Time EOB ended due to increased fatigue. She is very motivated to participate and improve functionally. At this time she is well below her baseline level of function and is not safe to return home at current level of mobility. Pt will continue to benefit from skilled PT services during this hospital admit to continue to improve transfer ability and efficiency as well as continue to progress pt's ambulation distance and cardiopulmonary endurance to maximize pt's functional independence and return to PLOF. Patient continues to demonstrate the need for moderate intensity therapy on a daily basis post acute exhibited by decreased independence with functional mobility    Problem List: weakness, impaired endurance, impaired functional mobility, impaired self care skills, gait instability, impaired balance, decreased upper extremity function, decreased lower extremity function, decreased safety awareness, edema, impaired cardiopulmonary response to activity.  Rehab Prognosis: Good; patient would benefit from acute skilled PT services to address these deficits and reach maximum level of function.      Goals:   Multidisciplinary Problems       Physical Therapy Goals          Problem: Physical Therapy    Goal Priority Disciplines Outcome Goal Variances Interventions   Physical Therapy Goal     PT, PT/OT Progressing     Description: PT goals to be met by: 8/15/24    Patient will perform rolling each way with minimum assistance  Patient will perform supine <> sitting with minimum assistance  Patient will perform sit <> stand transitions with maximal assistance  Patient will perform transfers from bed <> chair or BSC with maximal assistance using LRAD  Patient will perform standing for 60 seconds with maximal assistance using RW or LRAD    DME Justifications (see above for complete DME recommendations)    Bedside Commode- Patient has a mobility limitation that significantly impairs their  ability to participate in one or more mobility related activities of daily living, including toileting. This deficit can be resolved by using a bedside commode. Patient demonstrates mobility limitations that will cause them to be confined to one room at home without bathroom access for up to 30 days. Using a bedside commode will greatly improve the patient's ability to participate in MRADLs.     Rolling Walker- Patient demonstrates a mobility limitation that significantly impairs their ability to participate in one or more mobility related activities of daily living. Patient's mobility limitation cannot be sufficiently resolved with the use of a cane, but can be sufficiently resolved with the use of a rolling walker.The use of a rolling walker will considerably improve their ability to participate in MRADLs. Patient will use the walker on a regular basis at home.      Wheelchair-  Patient has a mobility limitation that significantly impairs their ability to participate in one or more mobility related activities of daily living in customary locations in the home. The mobility limitation cannot be sufficiently resolved by the use of a cane or walker. The use of a manual wheelchair will greatly improve the patient's ability to participate in MRADLs. The patient will use the wheelchair on a regular basis at home. They have expressed their willingness to use a manual wheelchair in the home, and have a caregiver who is available and willing to assist with the wheelchair if needed.     Hospital Bed ·       Patient requires a hospital bed for positioning of the body in ways that are not feasible with an ordinary bed. The patient requires special positioning for pain relief, limited mobility, and/or being unable to independently make changes in body position without the use of a hospital bed. Pillows and wedges will not be adequate for resolving these positional issues.                         Subjective     RN notified  "prior to session. Sister present upon PT entrance into room. Patient agreeable to PT re-evaluation    Chief Complaint: "I wanted to put my feet on the floor so badly"  Patient/Family Comments/goals: get stronger to go home  Pain/Comfort:  Pain Rating 1: 0/10  Pain Rating Post-Intervention 1: 0/10    Objective:     Additional staff present: OT; OT for co-evaluation due to suspected patient need for two skilled therapists to appropriately assess patient's functional deficits as well as ensure patient safety, accommodate for limited activity tolerance, and provide appropriate, skilled assistance to maximize functional potential during evaluation.    Patient found HOB elevated with: telemetry, blood pressure cuff, pulse ox (continuous), arterial line, PureWick, Trialysis     General Precautions: Standard, Cardiac aspiration, fall   Orthopedic Precautions:N/A   Braces: N/A   Respiratory Status: Room Air  Vitals: /79   Pulse (!) 128   Temp 99.3 °F (37.4 °C) (Axillary)   Resp 20   Ht 5' 7" (1.702 m)   Wt 128.5 kg (283 lb 4.7 oz)   SpO2 95%   Breastfeeding No   BMI 44.37 kg/m²      Exam:  Cognition:   Alert and Cooperative  Patient is oriented to Person, Place, Time, Situation  Command following: Follows multistep verbal commands  Fluency: clear/fluent  Hearing: Intact  Vision:  Intact  Skin Integrity: Visible skin intact  Posture: slouched posture and rounded shoulders  Physical Exam:    Left UE Left LE Right UE Right LE   Edema absent present absent present   ROM AAROM WFL AAROM WFL AAROM WFL AAROM WFL   Strength adequate ROM, decreased strength Knee ext and ankle DF/PF: 4/5 hip flexion 3-/5 adequate ROM, decreased strength Knee ext and ankle DF/PF: 4/5 hip flexion 3-/5   Sensation intact to light touch intact to light touch intact to light touch intact to light touch   Coordination not tested due to strength or pain deficits not tested due to strength or pain deficits not tested due to strength or pain " deficits not tested due to strength or pain deficits     Outcome Measures:  AM-PAC 6 CLICK MOBILITY  Turning over in bed (including adjusting bedclothes, sheets and blankets)?: 2  Sitting down on and standing up from a chair with arms (e.g., wheelchair, bedside commode, etc.): 1  Moving from lying on back to sitting on the side of the bed?: 2  Moving to and from a bed to a chair (including a wheelchair)?: 1  Need to walk in hospital room?: 1  Climbing 3-5 steps with a railing?: 1  Basic Mobility Total Score: 8     Functional Mobility:    Bed Mobility:   Rolling/Turning to Left: maximal assistance for technique  Rolling/Turning to Right: maximal assistance for technique  Scooting to HOB via supine bridge: total assistance and 2 persons   Supine to Sit: maximal assistance and 2 persons for LE management and for trunk management; to Rt side of bed  Scooting anteriorly to EOB to have both feet planted on floor: total assistance and 2 persons   Sit to Supine: total assistance and 2 persons for LE management and for trunk management; to Rt side of bed    Sitting Balance at Edge of Bed:  Static Sitting Balance: Poor+ : able to maintain balance with minimal assistance from individual or chair  Dynamic Sitting Balance: Poor: able to sit unsupported with moderate assistance and reach ipsilaterally or anteriorly. Unable to cross midline.  Assistance Level Required: CGA for static balance; mod A for dynamic balance  Time: 6 minutes  Postural deviations noted: slouched posture, rounded shoulders, forward head, trunk deviated right, and increased kyphosis  Comments: Time EOB focused primarily on tolerance to upright positioning, cardiopulmonary response and endurance for activities, and strength of postural musculature to perform dynamic sitting to prepare for tasks in the home. Pt able to accept internal and external perturbations to balance while seated EOB with appropriate trunk response to remain upright during static sitting  with BUE support. Able to perform reaches inside LYNDON but required Mod A to prevent posterior LOB and Rt lateral LOB. Pt became easily fatigued while EOB demonstrating FFP, increased Rt lateral lean with difficulty correcting balance and posture during static sitting. Due to this pt was returned to supine and further mobility deferred    Transfers/Gait: Deferred due to pt's performance with above listed functional mobility    Education:  Time provided for education, counseling and discussion of health disposition in regards to patient's current status  All questions answered within PT scope of practice and to patient's satisfaction  PT role in POC to address current functional deficits  Pt educated on proper body mechanics, safety techniques, and energy conservation with PT facilitation and cueing throughout session  Whiteboard updated with therapist name and pt's current mobility status documented above    Patient left HOB elevated with all lines intact, call button in reach, RN notified, and sister present.    Time Tracking:     PT Received On: 07/16/24  PT Start Time: 1316     PT Stop Time: 1342  PT Total Time (min): 26 min     Billable Minutes: Re-eval 10 minutes and Therapeutic Activity 16 minutes    7/16/2024

## 2024-07-16 NOTE — ASSESSMENT & PLAN NOTE
Echo    Left Ventricle: The left ventricle is moderately dilated. Normal wall thickness. Global hypokinesis present. There is severely reduced systolic function with a visually estimated ejection fraction of 20 - 25%.    Right Ventricle: Mild right ventricular enlargement. Wall thickness is normal. Right ventricle wall motion has global hypokinesis. Systolic function is moderately reduced.    Left Atrium: Left atrium is severely dilated.    Right Atrium: Right atrium is severely dilated.    Aortic Valve: The aortic valve is a trileaflet valve. There is mild aortic valve sclerosis.    Mitral Valve: There is mild regurgitation.    Tricuspid Valve: There is severe regurgitation.    Pulmonary Artery: There is moderate pulmonary hypertension. The estimated pulmonary artery systolic pressure is 61 mmHg.    IVC/SVC: Elevated venous pressure at 15 mmHg.    Pericardium: There is a trivial circumferential effusion. No indication of cardiac tamponade.    - Will require interventional cardiology for LHC vs PET for reduced EF once euvolemic  - LifeVest prior to discharge  - Continue Entresto 24-26mg BID per cardiology recs

## 2024-07-16 NOTE — SUBJECTIVE & OBJECTIVE
Interval History: Aspirated in the OR yesterday. Remain intubated and transferred to MICU. PD cath removed. One incision left open, healing appropriately so far. Afebrile, and hemodynamically unstable requiring 0.06 levo. Remains intubated AC/VC 40/5.    Medications:  Continuous Infusions:   sodium chloride 0.9%   Intravenous Continuous        heparin (porcine) in D5W  0-40 Units/kg/hr (Adjusted) Intravenous Continuous 11.5 mL/hr at 07/15/24 0001 13 Units/kg/hr at 07/15/24 0001    NORepinephrine bitartrate-D5W  0-0.2 mcg/kg/min Intravenous Continuous 28.1 mL/hr at 07/16/24 0601 0.06 mcg/kg/min at 07/16/24 0601    propofoL  0-50 mcg/kg/min Intravenous Continuous   Stopped at 07/16/24 0552     Scheduled Meds:   0.9% NaCl   Intravenous Once    amiodarone  400 mg Oral BID    Followed by    [START ON 7/22/2024] amiodarone  200 mg Oral Daily    aspirin  81 mg Oral Daily    famotidine (PF)  20 mg Intravenous Daily    gentamicin   Topical (Top) TID    metoprolol tartrate  25 mg Oral BID    mupirocin   Nasal BID     PRN Meds:  Current Facility-Administered Medications:     acetaminophen, 650 mg, Oral, Q6H PRN    albuterol sulfate, 2.5 mg, Nebulization, Q4H PRN    dextromethorphan-guaiFENesin  mg/5 ml, 5 mL, Oral, Q4H PRN    dextrose 10%, 12.5 g, Intravenous, PRN    dextrose 10%, 25 g, Intravenous, PRN    glucagon (human recombinant), 1 mg, Intramuscular, PRN    glucose, 16 g, Oral, PRN    glucose, 24 g, Oral, PRN    heparin (porcine), 1,000 Units, Intra-Catheter, PRN    heparin (porcine), 5,000 Units, Intra-Catheter, PRN    heparin (PORCINE), 60 Units/kg (Adjusted), Intravenous, PRN    heparin (PORCINE), 30 Units/kg (Adjusted), Intravenous, PRN    magnesium sulfate IVPB, 2 g, Intravenous, PRN    midodrine, 10 mg, Oral, Daily PRN    naloxone, 0.02 mg, Intravenous, PRN    ondansetron, 4 mg, Intravenous, Q6H PRN    simethicone, 1 tablet, Oral, TID PRN    sodium chloride 0.9%, 10 mL, Intravenous, PRN    sodium phosphate  20.01 mmol in D5W 250 mL IVPB, 20.01 mmol, Intravenous, PRN    sodium phosphate 30 mmol in D5W 250 mL IVPB, 30 mmol, Intravenous, PRN    sodium phosphate 39.99 mmol in D5W 250 mL IVPB, 39.99 mmol, Intravenous, PRN     Review of patient's allergies indicates:   Allergen Reactions    Percocet [oxycodone-acetaminophen] Itching    Januvia [sitagliptin]     Jardiance [empagliflozin]      Leg cramps    Lipitor [atorvastatin] Other (See Comments)     Severe leg pain    Linaclotide Other (See Comments) and Nausea And Vomiting     Does not remember    Lubiprostone Other (See Comments) and Palpitations     Does not remember     Objective:     Vital Signs (Most Recent):  Temp: 97.8 °F (36.6 °C) (07/16/24 0301)  Pulse: 110 (07/16/24 0724)  Resp: 20 (07/15/24 1006)  BP: (!) 80/50 (07/15/24 1400)  SpO2: 99 % (07/16/24 0724) Vital Signs (24h Range):  Temp:  [97.8 °F (36.6 °C)-97.9 °F (36.6 °C)] 97.8 °F (36.6 °C)  Pulse:  [] 110  Resp:  [20] 20  SpO2:  [94 %-100 %] 99 %  BP: ()/(50-67) 80/50  Arterial Line BP: (110-125)/(55-66) 110/58     Weight: 128.5 kg (283 lb 4.7 oz)  Body mass index is 44.37 kg/m².    Intake/Output - Last 3 Shifts         07/14 0700  07/15 0659 07/15 0700  07/16 0659 07/16 0700 07/17 0659    P.O. 200      I.V. (mL/kg) 416.9 (3.2) 725.5 (5.6)     Other 400      IV Piggyback 26.5 3     Total Intake(mL/kg) 1043.4 (8.1) 728.5 (5.7)     Urine (mL/kg/hr)  0 (0)     Other 2700      Stool 0      Total Output 2700 0     Net -1656.6 +728.5            Urine Occurrence 1 x      Stool Occurrence 3 x               Physical Exam  Vitals and nursing note reviewed.   Constitutional:       General: She is not in acute distress.     Appearance: She is obese. She is ill-appearing. She is not diaphoretic.      Comments: Intubated, sedated   HENT:      Head: Normocephalic and atraumatic.      Mouth/Throat:      Mouth: Mucous membranes are moist.      Pharynx: Oropharynx is clear.   Neck:      Comments: L trialysis cath  in place  Cardiovascular:      Rate and Rhythm: Normal rate.   Pulmonary:      Effort: No respiratory distress.      Comments: Intubated and sedated  Abdominal:      General: There is no distension.      Palpations: Abdomen is soft.      Tenderness: There is no guarding or rebound.      Comments: Large pannus; PD interval removal, incision left open re-packed   Musculoskeletal:         General: No deformity.   Skin:     General: Skin is warm and dry.          Significant Labs:  I have reviewed all pertinent lab results within the past 24 hours.    Significant Diagnostics:  I have reviewed all pertinent imaging results/findings within the past 24 hours.

## 2024-07-16 NOTE — PLAN OF CARE
Post-Acute Therapy Recommendation: moderate intensity     Reevaluation completed today. PT goals appropriate.    Please continue Progressive Mobility Protocol as appropriate.    Appropriate transfer level with nursing staff: bed level therex    7/16/2024    Problem: Physical Therapy  Goal: Physical Therapy Goal  Description: PT goals to be met by: 8/15/24    Patient will perform rolling each way with minimum assistance  Patient will perform supine <> sitting with minimum assistance  Patient will perform sit <> stand transitions with maximal assistance  Patient will perform transfers from bed <> chair or BSC with maximal assistance using LRAD  Patient will perform standing for 60 seconds with maximal assistance using RW or LRAD  Outcome: Progressing

## 2024-07-16 NOTE — ASSESSMENT & PLAN NOTE
Nephrology consulted for inpatient-HD management  admitted as a transfer for acute HFrEF in setting of CKD5 and recent PEA arrest during dialysis. Recent PD catheter placement on 6/4 but concerned for a possibly leak from cath. Unable to initiate dialysis for 2 weeks PTA to OSH due to insurance issues. Hospital course complicated by infected tunneled cath removed on 6/30. Transferred here for higher level of care. Initial plan at OSH was to replace tunneled catheter on the left side due to concerns to that right side may not by suitable. PD was initiated here in hospital however we were unable to remove significant amounts of fluid, At this time the daughter wishes to proceed with HD.     Plan/Recommendations  - Off pressors after extubation. Plan for SLED dialysis today, 4 hours.   - Tunnel cath placed 7/11.  - Still having clotting issues with HD, Cath flow,TPA, heparin.   - Per interventional nephrology recommendation will need 5000IU/ml Heparin lock in each port with each dialysis session.   - use 1500 IU Heparin with the priming of the machine   - Renally adjust medications  - Pre and Post-HD weights   - Continue to monitor intake and output

## 2024-07-16 NOTE — OP NOTE
[facility]  General Surgery  Operative Note    SUMMARY     Date of Procedure: 7/15/2024    Procedure: Bronchoscopy, Therapeutic    Provider: Tanya Beckham MD    Assisting Provider: None    Pre-Procedure Diagnosis: Aspiration    Post-Procedure Diagnosis: Aspiration    Indication: Juhi Taylor is a 74 year-old woman with multiple medical co-morbidities who was undergoing a PD catheter removal in the operating room. She aspirated while under anesthesia so a bronchoscopy was done urgently for assessment.    Anesthesia: General Anesthesia    Technical Procedures Used: flexible bronchoscopy    Description of the Findings of the Procedure:     The patient was already intubated and sedated in the operating room.  Given what occurred while she was under monitored anesthesia care, we decided to proceed with a bronchoscopy to assess her airway.  This was urgently done given what happened the OR.  The bronchocope was inserted into the main airway via the endotracheal tube. An anatomical survey was done of the main airways and the subsegmental bronchus.  There was some gastric contents and the trachea but none near the glenn or in the right or left mainstem bronchi.  A survey was done of the lower segmental branches and they are widely patent without any significant secretions or gastric fluid.  The right upper middle and lower lobe were widely patent without any significant findings of gastric fluid.  The right mainstem bronchus along with the segmental branches also were widely patent without any findings of gastric fluid.  The mucosa appeared normal without any lesions.  The findings are reported above.  This completed the procedure.    Significant Surgical Tasks Conducted by the Assistant(s), if Applicable: None    Complications: None; patient tolerated the procedure well.    Estimated Blood Loss (EBL): Minimal           Implants: None    Specimens: None       Condition: Good         Disposition: ICU - intubated and hemodynamically stable.    Attestation: I performed the procedure.

## 2024-07-16 NOTE — NURSING
CRRT SLED restarted to the left chest perm cath per orders placed by Dr. Alicea:    Order Questions    Question Answer Comment   Duration of treatment Other (specify) 4 hours   Dialyzer: R300    Dialysate Temperature (C) 36.5    Blood Flow Rate (mL/min) 150 mL/min    K+ 4 MEQ/L    Ca++ 2.25 MEQ/L    Na+ 135 meq/L    Bicarb 30 meq    Type of therapy: SLED (Slow Low-efficiency Dialysis)     cc/min    Ultra-filtration rate (cc/hr) 200-400        HR elevated, b/p stable    Primary nurse made aware of POC and expected UF removal of 400ml/ hr as tolerated

## 2024-07-17 PROBLEM — R53.81 DEBILITY: Status: ACTIVE | Noted: 2024-07-17

## 2024-07-17 NOTE — ASSESSMENT & PLAN NOTE
Patient with Hypoxic Respiratory failure which is Acute.  she is not on home oxygen. Supplemental oxygen was provided and noted- Vent Mode: Spont  Oxygen Concentration (%):  [30-40] 30  Resp Rate Total:  [21 br/min-24 br/min] 21 br/min  Vt Set:  [420 mL] 420 mL  PEEP/CPAP:  [5 cmH20] 5 cmH20  Pressure Support:  [5 cmH20] 5 cmH20  Mean Airway Pressure:  [7.2 cmH20-7.8 cmH20] 7.8 cmH20    .   Signs/symptoms of respiratory failure include- respiratory distress. Contributing diagnoses includes - Aspiration Labs and images were reviewed. Patient Has recent ABG, which has been reviewed. Will treat underlying causes and adjust management of respiratory failure as follows-     -- Extubated this am 07/16  -- Pulmonary toilet

## 2024-07-17 NOTE — PROGRESS NOTES
[facility] - Card[facility]  Critical Care Medicine  Progress Note    Patient Name: Juhi Taylor  MRN: [id number]  Admission Date: 7/3/2024  Hospital Length of Stay: 14 days  Code Status: Full Code  Attending Provider: Kristin Alvarado MD  Primary Care Provider: Tony Sadler MD   Principal Problem: ESRD (end stage renal disease)    Subjective:     HPI:  Juhi Taylor is a 74-year-old female with a past medical history of CKD stage 5 with recent PD catheter placement on 6/4, RCC s/p right nephrectomy, T2DM, obesity, CAD s/p PCI to Lcx and LAD in 11/2020 and HFmREF who is admitted as a transfer from [facility] for higher level of care and further cardiac evaluation. Nephrology consulted for hemodialysis. She initially presented on 6/18/24 to [facility] for hypervolemia in the setting of CKD 4-5 for which she had undergone elective PD catheter placement complicated by catheter site leaking. She had a complex hospital course, was initially placed on furosemide gtt without significant improvement and later underwent placement of tunneled line for HD with volume removal. During her second dialysis session she went into PEA arrest for which she was resuscitated, intubated and transferred to ICU. She was noted to have elevated troponin and subsequent TTE found reduced EF from 40-45% to 20-25% for which coronary angiography was recommended. Her hospital course was also complicated by dialysis line infection, significant bleeding around the catheter site, bradycardia and subsequent atrial fibrillation with RVR. Given the catheter site bleeding, anticoagulation was temporarily held. She has reported allergy to amiodarone, was initially placed on diltiazem for atrial fibrillation despite reduced EF. She discussed GOC at outside hospital where it appears that she lacked capacity per note and decided to pursue higher level of care at [facility]. At that time, gen surgery was planning to place a left tunneled catheter due to  concerns of the right side not being suitable     Upon admit, patient appeared drowsy but conversant. Daughter reports that she typically gets lethargic 2/2 to the volume overload due to not receiving dialysis. Patient reported b/l left lower extremity pain. Daughter concerned about her not having dialysis in 4 days and wants a session today.     Pt initially refusing dialysis. Seen by nephrology. VBG 7.220/33.4/34/13.7/-14. Critical care medicine consulted for emergent trialysis line placement and dialysis.     Hospital/ICU Course:  Ms. Taylor was admitted to MICU on 7/8 for urgent dialysis line placement and initiated of HD. Patient received short run of CRRT overnight without issue. Encephalopathy improving with continued CRRT treatments. Persistent AFib with RVR, received Digoxin loading dose (renally dosed), restarted PO Carvedilol. Patient remains in persistent AFib RVR despite therapeutic digoxin level and uptitration of Carvedilol levels, started on Amiodarone infusion overnight. Transitioned to amio PO, started on heparin gtt for afib AC. Tolerated HD well overnight without complications and less encephalopathic upon exam. Planning for gen surg to remove PD catheter. Upon induction pt vomited copious amounts of bile, was intubated. PD cathter removed successfully. Extubated to NC 07/16.     Interval History/Significant Events: No acute events overnight. Unable to perform CRRT due to line clotting issues. No plans for HD today. Ok to stepdown to hospital medicine.     Review of Systems   Unable to perform ROS: Age     Objective:     Vital Signs (Most Recent):  Temp: 98.3 °F (36.8 °C) (07/17/24 0301)  Pulse: 102 (07/17/24 0801)  Resp: 18 (07/17/24 0701)  BP: 130/60 (07/17/24 0801)  SpO2: 98 % (07/17/24 0801) Vital Signs (24h Range):  Temp:  [98.3 °F (36.8 °C)-99.3 °F (37.4 °C)] 98.3 °F (36.8 °C)  Pulse:  [] 102  Resp:  [18] 18  SpO2:  [93 %-100 %] 98 %  BP: ()/(53-79) 130/60  Arterial Line BP:  ()/(47-57) 94/47   Weight: 128.5 kg (283 lb 4.7 oz)  Body mass index is 44.37 kg/m².    No intake or output data in the 24 hours ending 07/17/24 0832       Physical Exam  Vitals and nursing note reviewed.   Constitutional:       Appearance: She is obese. She is ill-appearing.   Eyes:      Pupils: Pupils are equal, round, and reactive to light.   Cardiovascular:      Rate and Rhythm: Normal rate and regular rhythm.      Pulses: Normal pulses.      Heart sounds: Normal heart sounds.   Pulmonary:      Comments: Decreased at the bases  Abdominal:      General: Bowel sounds are normal.      Palpations: Abdomen is soft.   Musculoskeletal:         General: Normal range of motion.   Skin:     General: Skin is warm and dry.      Findings: Bruising present.   Neurological:      Mental Status: She is oriented to person, place, and time.            Vents:  Vent Mode: Spont (07/16/24 1003)  Set Rate: 15 BPM (07/16/24 0903)  Vt Set: 420 mL (07/16/24 0903)  Pressure Support: 5 cmH20 (07/16/24 1003)  PEEP/CPAP: 5 cmH20 (07/16/24 1003)  Oxygen Concentration (%): 30 (07/16/24 1003)  Peak Airway Pressure: 10 cmH20 (07/16/24 1003)  Plateau Pressure: 0 cmH20 (07/16/24 1003)  Total Ve: 5.45 L/m (07/16/24 1003)  Negative Inspiratory Force (cm H2O): 0 (07/16/24 1003)  Lines/Drains/Airways       Central Venous Catheter Line  Duration                  Hemodialysis Catheter 07/11/24 1219 left subclavian 5 days              Drain  Duration             Female External Urinary Catheter w/ Suction 07/15/24 1404 1 day              Peripheral Intravenous Line  Duration                  Midline Catheter - Single Lumen 07/12/24 0430 Right basilic vein (medial side of arm) 5 days         Peripheral IV - Single Lumen 07/12/24 1700 20 G 1 3/4 in Anterior;Distal;Right Forearm 4 days                  Significant Labs:    CBC/Anemia Profile:  Recent Labs   Lab 07/15/24  1458 07/16/24  0553 07/17/24  0431   WBC 21.01* 22.20* 22.96*   HGB 10.6* 10.4*  9.4*   HCT 33.5* 32.9* 30.2*    215 210   MCV 82 81* 82   RDW 21.4* 20.8* 20.7*        Chemistries:  Recent Labs   Lab 07/15/24  1458 07/15/24  1458 07/16/24  0553 07/16/24  1434 07/16/24  2157 07/17/24  0431   *  --  132* 132* 132* 132*  133*   K 4.7  --  5.3* 5.0 4.9 5.2*  5.0     --  100 101 103 102  102   CO2 19*  --  17* 19* 19* 18*  18*   BUN 41*  --  50* 55* 57* 62*  61*   CREATININE 5.0*  --  5.8* 6.2* 6.2* 6.6*  7.1*   CALCIUM 8.2*  --  8.5* 8.5* 8.1* 7.9*  7.8*   ALBUMIN 1.9*  --  1.8* 1.8* 1.6* 1.6*  1.6*   PROT 5.2*  --  5.3*  --   --  4.8*   BILITOT 0.9  --  0.9  --   --  0.7   ALKPHOS 147*  --  174*  --   --  144*   ALT 31  --  21  --   --  7*   AST 28  --  27  --   --  27   MG  --    < > 2.2 2.3 2.2 2.3   PHOS  --    < > 6.4* 6.6* 5.9* 5.8*  5.8*    < > = values in this interval not displayed.       None    Significant Imaging:  I have reviewed all pertinent imaging results/findings within the past 24 hours.    ABG  Recent Labs   Lab 07/15/24  1557   PH 7.314*   PO2 92   PCO2 38.3   HCO3 19.5*   BE -7*     Assessment/Plan:     Pulmonary  Acute respiratory failure with hypoxia  Patient with Hypoxic Respiratory failure which is Acute.  she is not on home oxygen. Supplemental oxygen was provided and noted- Vent Mode: Spont  Oxygen Concentration (%):  [30-40] 30  Resp Rate Total:  [21 br/min-24 br/min] 21 br/min  Vt Set:  [420 mL] 420 mL  PEEP/CPAP:  [5 cmH20] 5 cmH20  Pressure Support:  [5 cmH20] 5 cmH20  Mean Airway Pressure:  [7.2 cmH20-7.8 cmH20] 7.8 cmH20    .   Signs/symptoms of respiratory failure include- respiratory distress. Contributing diagnoses includes - Aspiration Labs and images were reviewed. Patient Has recent ABG, which has been reviewed. Will treat underlying causes and adjust management of respiratory failure as follows-     -- Extubated this am 07/16  -- Pulmonary toilet    Cardiac/Vascular  Atrial fibrillation with RVR  AFib with RVR appears new following  cardiac arrest at OSH. Per chart review, AFib was difficult to control. Patient endorses amiodarone reaction with itching, rash, and oral burning. Refused Sotalol, DCCV, and AICD placement at OSH. Was placed on Diltizem infusion and transitioned to PO metoprolol. Amio gtt restarted overnight 7/11 s/t uncontrolled afib rvr. Transitioned to PO amio.    - D/C heparin gtt  - Restart apixaban  - Daily CMP  - Keep K>4, Mag >2    Acute on chronic combined systolic and diastolic heart failure  Echo    Left Ventricle: The left ventricle is moderately dilated. Normal wall thickness. Global hypokinesis present. There is severely reduced systolic function with a visually estimated ejection fraction of 20 - 25%.    Right Ventricle: Mild right ventricular enlargement. Wall thickness is normal. Right ventricle wall motion has global hypokinesis. Systolic function is moderately reduced.    Left Atrium: Left atrium is severely dilated.    Right Atrium: Right atrium is severely dilated.    Aortic Valve: The aortic valve is a trileaflet valve. There is mild aortic valve sclerosis.    Mitral Valve: There is mild regurgitation.    Tricuspid Valve: There is severe regurgitation.    Pulmonary Artery: There is moderate pulmonary hypertension. The estimated pulmonary artery systolic pressure is 61 mmHg.    IVC/SVC: Elevated venous pressure at 15 mmHg.    Pericardium: There is a trivial circumferential effusion. No indication of cardiac tamponade.    - Will require interventional cardiology for LHC vs PET for reduced EF once euvolemic  - LifeVest prior to discharge  - Continue Entresto 24-26mg BID per cardiology recs    Essential hypertension  - Changed home Carvedilol to Metoprolol 25mg BID per cardiology recs    Renal/  * ESRD (end stage renal disease)  Pt previously on PD. Unable to remove enough volume with PD catheter. Initially upgraded to MICU for emergent trialysis placement and CRRT. Now with tunneled dialysis catheter. Tolerated  session of HD on 7/12 without complications.     - Nephrology following, appreciate recs  - Strict I/Os  - Renally dose meds  - Avoid nephrotoxic agents  - PD catheter removed 07/15  - Tolerated HD session overnight 07/15    Endocrine  Type 2 diabetes mellitus with microalbuminuria, without long-term current use of insulin  Latest A1C 6.8; on Metformin at home    - POC glucose  - Hypoglycemia protocol    Palliative Care  Palliative care encounter  - Palliative care following, continued GOC with family. Plan for family meeting today 07/17    ACP (advance care planning)  - Palliative care consulted, appreciate their assistance in clarification of GOC/POC with patient and family        Critical Care Time: 45 minutes  Critical secondary to Patient has a condition that poses threat to life and bodily function: Respiratory failure      Critical care was time spent personally by me on the following activities: development of treatment plan with patient or surrogate and bedside caregivers, discussions with consultants, evaluation of patient's response to treatment, examination of patient, ordering and performing treatments and interventions, ordering and review of laboratory studies, ordering and review of radiographic studies, pulse oximetry, re-evaluation of patient's condition. This critical care time did not overlap with that of any other provider or involve time for any procedures.     Connie Mar, NIXON  Critical Care Medicine  [facility] - Cardiac Medical [facility]

## 2024-07-17 NOTE — ASSESSMENT & PLAN NOTE
Nephrology consulted for inpatient-HD management  admitted as a transfer for acute HFrEF in setting of CKD5 and recent PEA arrest during dialysis. Recent PD catheter placement on 6/4 but concerned for a possibly leak from cath. Unable to initiate dialysis for 2 weeks PTA to OSH due to insurance issues. Hospital course complicated by infected tunneled cath removed on 6/30. Transferred here for higher level of care. Initial plan at OSH was to replace tunneled catheter on the left side due to concerns to that right side may not by suitable. PD was initiated here in hospital however we were unable to remove significant amounts of fluid, At this time the daughter wishes to proceed with HD.     Plan/Recommendations  - Had difficulty w dialysis yesterday due to clotting. Repeat SLED today. Planning for replacement of tunnel cath w interventional nephrology.   - Tunnel cath placed 7/11.  - Still having clotting issues with HD, Cath flow,TPA, heparin.   - Per interventional nephrology recommendation will need 5000IU/ml Heparin lock in each port with each dialysis session.   - use 1500 IU Heparin with the priming of the machine   - Renally adjust medications  - Pre and Post-HD weights   - Continue to monitor intake and output

## 2024-07-17 NOTE — SUBJECTIVE & OBJECTIVE
Interval History/Significant Events: No acute events overnight. Unable to perform CRRT due to line clotting issues. No plans for HD today. Ok to stepdown to hospital medicine.     Review of Systems   Unable to perform ROS: Age     Objective:     Vital Signs (Most Recent):  Temp: 98.3 °F (36.8 °C) (07/17/24 0301)  Pulse: 102 (07/17/24 0801)  Resp: 18 (07/17/24 0701)  BP: 130/60 (07/17/24 0801)  SpO2: 98 % (07/17/24 0801) Vital Signs (24h Range):  Temp:  [98.3 °F (36.8 °C)-99.3 °F (37.4 °C)] 98.3 °F (36.8 °C)  Pulse:  [] 102  Resp:  [18] 18  SpO2:  [93 %-100 %] 98 %  BP: ()/(53-79) 130/60  Arterial Line BP: ()/(47-57) 94/47   Weight: 128.5 kg (283 lb 4.7 oz)  Body mass index is 44.37 kg/m².    No intake or output data in the 24 hours ending 07/17/24 0832       Physical Exam  Vitals and nursing note reviewed.   Constitutional:       Appearance: She is obese. She is ill-appearing.   Eyes:      Pupils: Pupils are equal, round, and reactive to light.   Cardiovascular:      Rate and Rhythm: Normal rate and regular rhythm.      Pulses: Normal pulses.      Heart sounds: Normal heart sounds.   Pulmonary:      Comments: Decreased at the bases  Abdominal:      General: Bowel sounds are normal.      Palpations: Abdomen is soft.   Musculoskeletal:         General: Normal range of motion.   Skin:     General: Skin is warm and dry.      Findings: Bruising present.   Neurological:      Mental Status: She is oriented to person, place, and time.            Vents:  Vent Mode: Spont (07/16/24 1003)  Set Rate: 15 BPM (07/16/24 0903)  Vt Set: 420 mL (07/16/24 0903)  Pressure Support: 5 cmH20 (07/16/24 1003)  PEEP/CPAP: 5 cmH20 (07/16/24 1003)  Oxygen Concentration (%): 30 (07/16/24 1003)  Peak Airway Pressure: 10 cmH20 (07/16/24 1003)  Plateau Pressure: 0 cmH20 (07/16/24 1003)  Total Ve: 5.45 L/m (07/16/24 1003)  Negative Inspiratory Force (cm H2O): 0 (07/16/24 1003)  Lines/Drains/Airways       Central Venous Catheter  Line  Duration                  Hemodialysis Catheter 07/11/24 1219 left subclavian 5 days              Drain  Duration             Female External Urinary Catheter w/ Suction 07/15/24 1404 1 day              Peripheral Intravenous Line  Duration                  Midline Catheter - Single Lumen 07/12/24 0430 Right basilic vein (medial side of arm) 5 days         Peripheral IV - Single Lumen 07/12/24 1700 20 G 1 3/4 in Anterior;Distal;Right Forearm 4 days                  Significant Labs:    CBC/Anemia Profile:  Recent Labs   Lab 07/15/24  1458 07/16/24  0553 07/17/24  0431   WBC 21.01* 22.20* 22.96*   HGB 10.6* 10.4* 9.4*   HCT 33.5* 32.9* 30.2*    215 210   MCV 82 81* 82   RDW 21.4* 20.8* 20.7*        Chemistries:  Recent Labs   Lab 07/15/24  1458 07/15/24  1458 07/16/24  0553 07/16/24  1434 07/16/24  2157 07/17/24  0431   *  --  132* 132* 132* 132*  133*   K 4.7  --  5.3* 5.0 4.9 5.2*  5.0     --  100 101 103 102  102   CO2 19*  --  17* 19* 19* 18*  18*   BUN 41*  --  50* 55* 57* 62*  61*   CREATININE 5.0*  --  5.8* 6.2* 6.2* 6.6*  7.1*   CALCIUM 8.2*  --  8.5* 8.5* 8.1* 7.9*  7.8*   ALBUMIN 1.9*  --  1.8* 1.8* 1.6* 1.6*  1.6*   PROT 5.2*  --  5.3*  --   --  4.8*   BILITOT 0.9  --  0.9  --   --  0.7   ALKPHOS 147*  --  174*  --   --  144*   ALT 31  --  21  --   --  7*   AST 28  --  27  --   --  27   MG  --    < > 2.2 2.3 2.2 2.3   PHOS  --    < > 6.4* 6.6* 5.9* 5.8*  5.8*    < > = values in this interval not displayed.       None    Significant Imaging:  I have reviewed all pertinent imaging results/findings within the past 24 hours.

## 2024-07-17 NOTE — PT/OT/SLP PROGRESS
Occupational Therapy  Co Treatment    Name: Juhi Taylor  MRN: [id number]  Admitting Diagnosis:  ESRD (end stage renal disease)  2 Days Post-Op    Recommendations:     Discharge Recommendations: Moderate Intensity Therapy  Discharge Equipment Recommendations:  walker, rolling, bedside commode      Assessment:     Juhi Taylor is a 74 y.o. female with a medical diagnosis of ESRD (end stage renal disease). Performance deficits affecting function are weakness, gait instability, decreased upper extremity function, impaired balance, decreased lower extremity function, impaired endurance, impaired self care skills, impaired functional mobility, impaired cardiopulmonary response to activity.   Pt tolerated session well with good effort and improved tolerance for activity at this time.   Rehab Prognosis:  Good; patient would benefit from acute skilled OT services to address these deficits and reach maximum level of function.       Plan:     Patient to be seen 4 x/week to address the above listed problems via self-care/home management, therapeutic activities, therapeutic exercises  Plan of Care Expires: 08/16/24  Plan of Care Reviewed with: patient, daughter    Subjective     Pt agreeable to therapy.     Pain/Comfort:  Pain Rating 1: 0/10    Objective:     Communicated with: nsg prior to session.  Patient found in bed with tele, pulse ox, BP cuff, Jean Marie cath. Daughter in room.   Cotx completed this date to optimize functional performance and safety given impaired tolerance for activity in setting of ICU   General Precautions: Standard, fall      Occupational Performance:     Bed Mobility:    Supine>sit with MAX A x 2  Sit>supine with TOTAL A x 2     Functional Mobility/Transfers:  One standing trial with MAX A x 2 and able to achieve 75% upright standing.     Activities of Daily Living:  Feeding: set-up  G/H pt able to wash face/hands and use oral swab with SBA seated EOB. Unable to tolerate standing g/h  skills due to impaired standing balance/endurance.   LE dressing: TOTAL A   UE dressing: simulated with MAX A     [facility] 6 Click ADL: 10    Treatment & Education:  Pt awake, alert and following commands. Pt tolerated sitting EOB approx 12 min with initially MIN A for static sitting. Several episodes of LOB to the right required MIN A to return to midline. As sitting continued, balance and posture improved and pt able to demo SBA for postural control.     Education provided to pt and daughter re: OT POC and UE HEP to perform 3 x daily 10 reps x 2 planes for UE AROM. Exs written on board in room as a visual reminder and pt/daughter verb understanding.     Education provided re: role of OT and safety with functional mobility/ADl skills.       Patient left HOB elevated with all lines intact, call button in reach, nsg notified, and daughter  present    GOALS:   Multidisciplinary Problems       Occupational Therapy Goals          Problem: Occupational Therapy    Goal Priority Disciplines Outcome Interventions   Occupational Therapy Goal     OT, PT/OT Progressing    Description: Goals to be met by: 08/04/24     Patient will increase functional independence with ADLs by performing:    UE Dressing with Modified [place].  LE Dressing with Minimal Assistance.  Grooming while standing at sink with Stand-by Assistance.  Toileting from bedside commode with Minimal Assistance for hygiene and clothing management.   Toilet transfer to bedside commode with Supervision.    DME:  Tub transfer bench is required for pt to return to t/s use with shower chair at present unsuitable with need for mobiltiy greater than pt can safely complete at present.     Patient has a mobility limitation that significantly impairs their ability to participate in one or more mobility related activities of daily living, including toileting. This deficit can be resolved by using a bedside commode. Patient demonstrates mobility limitations that will cause  them to be confined to one room at home without bathroom access for up to 30 days. Using a bedside commode will greatly improve the patient's ability to participate in MRADLs.     Ambulation needs TBD on progress.                              Time Tracking:     OT Date of Treatment: 07/17/24  OT Start Time: 0935  OT Stop Time: 1001  OT Total Time (min): 26 min    Billable Minutes:Self Care/Home Management 10  Therapeutic Activity 16    OT/DAYSI: OT          7/17/2024

## 2024-07-17 NOTE — PROGRESS NOTES
07/17/24 1516   Treatment   Treatment Type SLED   Treatment Status Restart   Dialysis Machine Number [id number]   Dialyzer Time (hours) 0   BVP (Liters) 0 L   Solutions Labeled and Current  Yes   Access Tunneled Cath;Left;IJ   Catheter Dressing Intact  Yes   Alarms Engaged Yes   CRRT Comments sled restarted   Prescription   Time (Hours) 12   Dialysate K + (mEq/L) 3  (3K x3 hours then change to 4K)   Dialysate CA + (mEq/L) 2.25   Dialysate HCO3 - (Bicarb) (mEq/L) 30   Dialysate Na + (mEq/L) 138   Cartridge Type Other  (r300)   Dialysate Flow Rate (mL/min) 200   UF Goal Rate 400 mL/hr   CRRT Hourly Documentation   Blood Flow (mL/min) 150   UF Rate 200 cc/hr   Arterial Pressure (mmHg) -50 mmHg   Venous Pressure (mmHg) 50 mmHg   Effluent Pressure (EP) (mmHg) 0 mmHg   Total UF (Hourly Cleared) (mL) 0     SLED restarted as ordered. [facility] PC aspirated, flushed, and accessed using aseptic technique. Lines connected and secured.

## 2024-07-17 NOTE — PLAN OF CARE
Pt made DNR after goals of care conversation  Continued issues with tunneled HD cath; will try SLED again today  Possible transfer to floor    Problem: Adult Inpatient Plan of Care  Goal: Plan of Care Review  Outcome: Progressing  Goal: Patient-Specific Goal (Individualized)  Outcome: Progressing  Goal: Absence of Hospital-Acquired Illness or Injury  Outcome: Progressing  Goal: Optimal Comfort and Wellbeing  Outcome: Progressing  Goal: Readiness for Transition of Care  Outcome: Progressing     Problem: Diabetes Comorbidity  Goal: Blood Glucose Level Within Targeted Range  Outcome: Progressing     Problem: Sepsis/Septic Shock  Goal: Optimal Coping  Outcome: Progressing  Goal: Absence of Bleeding  Outcome: Progressing  Goal: Blood Glucose Level Within Targeted Range  Outcome: Progressing  Goal: Absence of Infection Signs and Symptoms  Outcome: Progressing  Goal: Optimal Nutrition Intake  Outcome: Progressing     Problem: Bariatric Environmental Safety  Goal: Safety Maintained with Care  Outcome: Progressing     Problem: Wound  Goal: Optimal Coping  Outcome: Progressing  Goal: Optimal Functional Ability  Outcome: Progressing  Goal: Absence of Infection Signs and Symptoms  Outcome: Progressing  Goal: Improved Oral Intake  Outcome: Progressing  Goal: Optimal Pain Control and Function  Outcome: Progressing  Goal: Skin Health and Integrity  Outcome: Progressing  Goal: Optimal Wound Healing  Outcome: Progressing     Problem: Skin Injury Risk Increased  Goal: Skin Health and Integrity  Outcome: Progressing     Problem: Fall Injury Risk  Goal: Absence of Fall and Fall-Related Injury  Outcome: Progressing     Problem: Coping Ineffective  Goal: Effective Coping  Outcome: Progressing     Problem: CRRT (Continuous Renal Replacement Therapy)  Goal: Safe, Effective Therapy Delivery  Outcome: Progressing  Goal: Hemodynamic Stability  Outcome: Progressing  Goal: Body Temperature Maintained in Desired Range  Outcome:  Progressing  Goal: Absence of Infection Signs and Symptoms  Outcome: Progressing     Problem: Infection  Goal: Absence of Infection Signs and Symptoms  Outcome: Progressing     Problem: Hemodialysis  Goal: Safe, Effective Therapy Delivery  Outcome: Progressing  Goal: Effective Tissue Perfusion  Outcome: Progressing  Goal: Absence of Infection Signs and Symptoms  Outcome: Progressing     Problem: Mechanical Ventilation Invasive  Goal: Effective Communication  Outcome: Progressing  Goal: Optimal Device Function  Outcome: Progressing  Goal: Mechanical Ventilation Liberation  Outcome: Progressing  Goal: Optimal Nutrition Delivery  Outcome: Progressing  Goal: Absence of Device-Related Skin and Tissue Injury  Outcome: Progressing  Goal: Absence of Ventilator-Induced Lung Injury  Outcome: Progressing     Problem: Artificial Airway  Goal: Effective Communication  Outcome: Progressing  Goal: Optimal Device Function  Outcome: Progressing  Goal: Absence of Device-Related Skin or Tissue Injury  Outcome: Progressing

## 2024-07-17 NOTE — PROGRESS NOTES
" Palliative Medicine  Consult Note       Patient Name: Juhi Taylor   MRN: [id number]   Admission Date: 7/3/2024   Hospital Length of Stay: 14   Attending Provider: Kristin Alvarado MD   Consulting Provider: SEAMUS Gautam  Primary Care Physician: Tony Sadler MD   Principal Problem: ESRD (end stage renal disease)     Patient information was obtained from relative(s), past medical records, and ER records.           Assessment/Plan:    CHF with complicated cardiac history.  Patient does not want further intervention and wants to go home. Youngest daughter wants to continue treatments.  Will need sequential treatments in efforts to be "better" but no one is convinced that she has the functional status to get "better".  ESRD does not want HD catheter placed. Will ask Nephro if PD can be restarted.    Palliative Care Encounter:  Impression:  Elderly woman with multiple medical problems including end-stage renal disease, worsening heart failure as well as significant dysrhythmias.  Patient says that she wants to go home however it is unclear if she understands that going home means that she will have a limited life span.  In her current date I am not sure she has a candidate for either peritoneal or hemodialysis.     As of 7/16/24 pt has been at [facility] for 14 days (as transfer from OSH). She has received tunneled cath, tolerated dialysis, had PD cath removed in OR resulting in aspiration and intubation w/ pressors overnight. She was extubated and aaox3. However, pt's tunneled cath is not working properly. This am nephrology discussed with pt and 2 daughters at  that pt will need exchange of tunneled cath if she wishes to continue with dialysis.     Palliative care consulted for goals of care and decision making.       Advance Care Planning   Advance Directives:   Living Will: No    Do Not Resuscitate Status: Yes    Medical Power of : No      Decision Making:  Family answered questions and " "Patient unable to communicate due to disease severity/cognitive impairment  Goals of Care: What is most important right now is to focus on care that is providing pt independence and comfort. Accordingly, we have decided that the best plan to meet the patient's goals includes continuing with treatment, with limits to invasive therapies.       7/17/24: Myself and Tiff ANAND ([facility]) met with pt this morning. Present were pt's 2 daughters, Nell and Maria Luisa and pt's son, Dawood, was on speaker phone for meeting. Pt is aaox3 and willing to participate in conversation.   -Introduced that goal of meeting was for pt to share her wishes and values relating to medical care so that her children may hear.   -Led discussion on what is important to pt, she shares that her independence is important and she would not value a life in which she would be on machines and/or living in a nursing facility for the rest of her life.  -Without prompting pt verbalized that she would like to be a DNR, for clarity purposes I asked her what that means. She reported that it means "Do not resuscitate". She began discussing that she is very henrry filled and shares that if it's her time to die, she is ready.  -Explored her understanding of nephrology discussion with her this morning previous to our conversation. She shares (correctly so) that her new line (tunneled catheter) is not working and that if she wants to continue with dialysis she will need a new line placed. She shares that she is tired of having procedures and is unclear at this time if she would like to have a new line placed. Of note, nephrology is planing to dialyze her again today with current line, at slower rate. She is agreeable to dialysis today.   -Began discussion that if she does not want new line placed and does not wish to continue with dialysis, home with hospice is another option. Pt's  and son were on hospice and she is familiar with hospice concept and goals of care. " "  -She shares that she very much wants to be comfortable at end of life. She does not know yet if she will decided to have tunneled catheter replaced yet.   -Dawood was present on phone for majority of conversation and shared that he will respect his mothers wishes upon hanging up. (Was on phone when pt stated DNR wish)  -Daughters at  were mostly respectful of conversation and pt's wishes. Nell was upset toward end of conversation, feel that [facility] was only present to "harass" pt and family. Reiterated that [facility] goal is to understand pt's wishes so that all care going forward is in alignment with her goals, no matter what they are.   -Nell shares with her mother that she does not feel she could care for her mom at home if she were on hospice. Maria Luisa reassured mother that she would be present to care for her, either at pt's home or at her own home, if pt decided to utilize hospice.   -Nell was very upset at end of meeting verbalizing that she feels we are all trying to coerce her mother into dying.   -Communicated conversation and pt's DNR wish with primary team.         7/16/24: Ms. Taylor was extubated this and doing well on NC. I visited with her this morning who is AAOx3, and her sister was in the room.   -discussed with pt hospital course that has taken place since her original admission to OSH. She was able to recall some of it and appreciative of review.  -Discussed that our goal as healthcare providers is to make sure we are treating her in a way that is meaningful for her and aligns with her values/wishes. She is understanding of this.   -Her current goal is to be discharged to a rehab facility for strengthening so that she may return home. She is ok with receiving dialysis if she were able to go a few days a week.  -She shares she would never want to have to live in a facility for the rest of her life, no matter the reason. Feel if this were the case, she would rather be kept comfortable at " home through end of life.  -Explored what QOL means to her: She shared it means maintaining her independence in getting around her home and ability to get her own groceries.   -Pt shares her henrry in God and that when the Lord is ready for her, she's ready too.  -Discussed that sometimes despite our hopes and plans, things occur that prevent us from reaching our goals. Discussed concern about her overall health and debility related to hospitalization. Explained importance of letting her family know what she does/does not want for medial care in the event she is unable to speak for herself again. She is understanding of this and asks to think about it for a bit. She is agreeable to me coming back tomorrow to continue conversation.   -dnr not dicussed today due to pt asking to continue conversation tomorrow.     -Spoke with pt's daughters, Francy and Maria Luisa, who are both agreeable to meeting tomorrow about 10 am so that pt can discuss her GOC. Maria Luisa shares their brother, Dawood will likely be at work but can call in to participate.   -Communicated with primary team.            - Prior experience with serious illness: yes  -The patient has previously engaged in advance care planning or GOC discussions  - Insight/Understanding of illness: difficult decision making between family members.    Patient does not have a healthcare power of  but she has 3 of 4 living children.  Son Dawood and daughter Maria Luisa are in agreement however daughter francy is not willing to participate in the conversation.  But no decisions have been made    Life Limiting Diagnosis:  ESHD and ESRD  -Prognosis-Time and potential for recovery: poor  -Functional status: poor.  Pt was not able to manage ADLs  -Dementia diagnosis no      Symptom Management:  -Pain: no  -Debility: pt has been bed bound throughout duration of hospitalization. She was walking some prior. Family is understanding that pt will need LTAC/Rehab upon d/c.     -Dyspnea  no    -Anxiety/Depression no    -Constipation: no    -Anorexia:yes      Summary of recommendations and follow up plan:  -Most important goals at this time: Further discussion about care plans and what might be beneficial   -Code status: Pt requesting DNR today.   -Disposition: continue current level of care.      Thank you for your consult. I will follow-up with patient. Please contact us if you have any additional questions.       Subjective:     Chief Complaint: No chief complaint on file.        HPI:   Ms. Taylor is a 73yo female with a PMHx of CAD, ESRD on PD, HFrEF of 40%, and afib on eliquis who was transferred to [facility] from [facility] for a higher level of care. She originally presented to [facility] ED on 6/18 for worsening SOB and was found to be satting 89% on RA determined to be due to a heart failure exacerbation. She was given lasix gtt and metolazone but still produced insufficient urine. HD was started, but during a dialysis session on 6/24 she suffered PEA cardiac arrest, epi was administered and she converted to V tach. After cardioversion ROSC was achieved and she was intubated and transferred to the ICU. Repeat Echo revealed an EF of 20-25% from 40% on admission but cardiology did not intervene at this time to due to her clinical condition but recommended later evaluation for ICD and angiogram. Her condition improved with further HD. She was extubated and moved to the floor but developed an uptrending leukocytosis possibly originating from a left trialysis catheter with bruising and possible purulent discharge. The line was removed. She was determined to not be a candidate for hospice. The family requested transfer to [facility] for a higher level of care.       Hospital Course:  Since arrival to [facility], patient has refused interventions.  This morning in fact she refused taking her medications.  I was asked to see her to help delineate goals of care and further treatments.  Her son and oldest daughter Maria Luisa feel that  the patient is nearing the end of her life.  Other daughter francy does not want to hear anything about this.  She also does not think that her mother is capable of making any decisions.  Patient was pretty clear about going home and yesterday apparently understood the ramifications of stopping all of her current treatments.    Review of Symptoms      Symptom Assessment (ESAS 0-10 Scale)  Pain:  0  Dyspnea:  0  Anxiety:  0  Nausea:  0  Depression:  0  Anorexia:  0  Fatigue:  0  Insomnia:  0  Restlessness:  0  Agitation:  0  Unable to complete assessment due to Mental status change     CAM / Delirium:  Negative  Constipation:  Negative  Diarrhea:  Negative      Bowel Management Plan (BMP):  Yes      Pain Assessment:  OME in 24 hours:  0  Location(s):      Pain Assessment in Advanced Demential Scale (PAINAD)   Breathing - Independent of vocalization:  0  Negative vocalization:  0  Facial expression:  0  Body language:  0  Consolability:  0  Total:  0    Modified Chago Scale:  0    Performance Status:  40    Living Arrangements:  Lives with family, Lives in home and Lives >50 miles from facility    Psychosocial/Cultural:   See Palliative Psychosocial Note: No  , 3 living children.    of pancreatic cancer with hospice. Son  with hospice as well.  Daughter Francy lives with patient, daughter Criss and son Dawood /wife supportive. They do not want mom to suffer.  **Primary  to Follow**  Palliative Care  Consult: Yes    Spiritual:  F - Fely and Belief:  Non Caodaism  I - Importance:  Yes  C - Community:  Unknown  A - Address in Care:  Unknown         ROS:  Review of Systems   Constitutional:  Positive for activity change, appetite change and fatigue.   Respiratory:  Positive for shortness of breath.    Cardiovascular:  Negative for chest pain.   Neurological:  Positive for weakness.         Past Medical History:   Diagnosis Date    Anticoagulant long-term use      Arthritis     Breast cancer 2014    invasive lobular carcinoma    Cancer of kidney 11/2020    RIGHT KIDNEY CANCER    CHF (congestive heart failure)     Coronary artery disease dx 2005    Depression     Diabetes mellitus     Diastolic heart failure secondary to hypertension     Gout     Hyperlipemia     Hypertension     Hypertrophy of nasal turbinates     Kidney mass 2020    Right    Levoscoliosis     Lung nodule     left    Multiple thyroid nodules     NICOLE (obstructive sleep apnea)     uses C-PAP    Pulmonary hypertension     Severe sepsis 07/01/2024     Past Surgical History:   Procedure Laterality Date    AORTOGRAPHY N/A 12/04/2020    Procedure: Aortogram;  Surgeon: Paul Pedersen MD;  Location: [facility] CATH;  Service: Cardiology;  Laterality: N/A;    BREAST SURGERY      BRONCHOSCOPY N/A 7/15/2024    Procedure: BRONCHOSCOPY, FLEXIBLE;  Surgeon: Tanya Beckham MD;  Location: [facility] OR [address] [place] [address];  Service: General;  Laterality: N/A;    CARDIAC CATHETERIZATION  12/2020    CHOLECYSTECTOMY      COLONOSCOPY      multi -last 2014     CORONARY ARTERY BYPASS GRAFT      ESOPHAGOGASTRODUODENOSCOPY      2012     HAND SURGERY Right     INSERTION OF CATHETER N/A 6/23/2024    Procedure: Insertion,catheter;  Surgeon: Meli Griffin MD;  Location: [facility] OR;  Service: Vascular;  Laterality: N/A;  ORIGINAL CATHETER WAS REPOSITIONED.  NO NEW CATHETER WAS PLACED    INSERTION OF TUNNELED CENTRAL VENOUS HEMODIALYSIS CATHETER Right 7/11/2024    Procedure: Insertion, Catheter, Central Venous, Hemodialysis;  Surgeon: Hawa Landa MD;  Location: [facility] CATH LAB;  Service: Interventional Nephrology;  Laterality: Right;    INSERTION, CATHETER, DIALYSIS, PERITONEAL N/A 6/4/2024    Procedure: IN Insertion Catheter, Dialysis, Peritoneal - laparoscopic;  Surgeon: Rodriguez Catalan Jr., MD;  Location: [facility] OR;  Service: General;  Laterality: N/A;    LAPAROSCOPIC ROBOT-ASSISTED SURGICAL REMOVAL OF KIDNEY USING DA CHELLE XI Right 03/10/2022     "Procedure: XI ROBOTIC NEPHRECTOMY- radical;  Surgeon: Rolando Ramirez MD;  Location: [facility] OR;  Service: Urology;  Laterality: Right;    MASTECTOMY W/ SENTINEL NODE BIOPSY Bilateral 01/21/2015    bilateral "dog ears"    NASAL SINUS SURGERY  2015    Dr Bryant FESS/cauterization turbinate     PARTIAL HYSTERECTOMY  1989    PERCUTANEOUS TRANSLUMINAL BALLOON ANGIOPLASTY OF CORONARY ARTERY  12/04/2020    Procedure: Angioplasty-coronary;  Surgeon: Paul Pedersen MD;  Location: [facility] CATH;  Service: Cardiology;;    PERITONEAL CATHETER REMOVAL N/A 7/15/2024    Procedure: REMOVAL, CATHETER, DIALYSIS, PERITONEAL;  Surgeon: Tanya Beckham MD;  Location: [facility] OR [facility] FLR;  Service: General;  Laterality: N/A;    REMOVAL OF HEMODIALYSIS CATHETER  7/11/2024    Procedure: REMOVAL, CATHETER, HEMODIALYSIS;  Surgeon: Hawa Landa MD;  Location: [facility] CATH LAB;  Service: Interventional Nephrology;;    RENAL BIOPSY Right     9/20/2021 EJ    TUBAL LIGATION      ULTRASOUND GUIDANCE  12/04/2020    Procedure: Ultrasound Guidance;  Surgeon: Paul Pedersen MD;  Location: [facility] CATH;  Service: Cardiology;;     Family History   Problem Relation Name Age of Onset    Breast cancer Mother      Stroke Father Waqar Sr.     Hypertension Father Waqar Sr.     Hepatitis Brother      Asthma Daughter Nell Taylor     Birth defects Daughter Nell Camejo's two children has cleft lips    Depression Daughter Nell Taylor     Drug abuse Daughter Nell Taylor     Learning disabilities Daughter Nell Taylor     Mental illness Daughter Nell Taylor     Breast cancer Maternal Aunt      Glaucoma Sister      Drug abuse Daughter Nell     Macular degeneration Neg Hx      Retinal detachment Neg Hx           Review of patient's allergies indicates:   Allergen Reactions    Percocet [oxycodone-acetaminophen] Itching    Januvia [sitagliptin]     Jardiance [empagliflozin]      Leg cramps    Lipitor [atorvastatin] Other (See " Comments)     Severe leg pain    Linaclotide Other (See Comments) and Nausea And Vomiting     Does not remember    Lubiprostone Other (See Comments) and Palpitations     Does not remember       Medications:    Current Facility-Administered Medications:     0.9%  NaCl infusion (CRRT USE ONLY), , Intravenous, Continuous, Jon Hernandez MD    acetaminophen tablet 650 mg, 650 mg, Oral, Q6H PRN, Zakia Miranda, DO, 650 mg at 07/05/24 1550    albuterol sulfate nebulizer solution 2.5 mg, 2.5 mg, Nebulization, Q4H PRN, Zakia Miranda, DO    amiodarone tablet 400 mg, 400 mg, Oral, BID, 400 mg at 07/17/24 0851 **FOLLOWED BY** [START ON 7/22/2024] amiodarone tablet 200 mg, 200 mg, Oral, Daily, Medina Clayton, AGACNP-BC    apixaban tablet 5 mg, 5 mg, Oral, BID, Connie Mar, DNP, 5 mg at 07/17/24 0858    aspirin EC tablet 81 mg, 81 mg, Oral, Daily, Zakia Miranda, , 81 mg at 07/17/24 0851    dextromethorphan-guaiFENesin  mg/5 ml liquid 5 mL, 5 mL, Oral, Q4H PRN, Zakia Miranda, , 5 mL at 07/03/24 1302    dextrose 10% bolus 125 mL 125 mL, 12.5 g, Intravenous, PRN, Jose Roberto Pruett Jr., MD    dextrose 10% bolus 250 mL 250 mL, 25 g, Intravenous, PRN, Jose Roberto Pruett Jr., MD    gentamicin 0.1 % ointment, , Topical (Top), TID, Jon Hernandez MD, Given at 07/17/24 0851    glucagon (human recombinant) injection 1 mg, 1 mg, Intramuscular, PRN, Zakia Miranda,     glucose chewable tablet 16 g, 16 g, Oral, PRN, Zakia Miranda,     glucose chewable tablet 24 g, 24 g, Oral, PRN, Zakia Miranda, DO    heparin (porcine) injection 1,000 Units, 1,000 Units, Intra-Catheter, PRN, Spencer Frank MD    heparin (porcine) injection 5,000 Units, 5,000 Units, Intra-Catheter, PRN, Paulo Alicea MD    magnesium sulfate 2g in water 50mL IVPB (premix), 2 g, Intravenous, PRN, Jon Hernandez MD    metoprolol tartrate (LOPRESSOR) tablet 25 mg, 25 mg, Oral, BID, Medina Clayton, AGACNP-BC, 25 mg at 07/17/24 0881     midodrine tablet 10 mg, 10 mg, Oral, Daily PRN, Tasneem Ayers NP, 10 mg at 07/14/24 2220    mupirocin 2 % ointment, , Nasal, BID, Kristin Alvarado MD, Given at 07/17/24 0851    naloxone 0.4 mg/mL injection 0.02 mg, 0.02 mg, Intravenous, PRN, Zakia Miranda DO    ondansetron injection 4 mg, 4 mg, Intravenous, Q6H PRN, Zakia Miranda DO, 4 mg at 07/15/24 1227    simethicone chewable tablet 80 mg, 1 tablet, Oral, TID PRN, Zakia Miranda DO, 80 mg at 07/17/24 0858    sodium chloride 0.9% flush 10 mL, 10 mL, Intravenous, PRN, Hawa Landa MD    sodium phosphate 20.01 mmol in D5W 250 mL IVPB, 20.01 mmol, Intravenous, PRN, Jon Hernandez MD    sodium phosphate 30 mmol in D5W 250 mL IVPB, 30 mmol, Intravenous, PRN, Jon Hernandez MD    sodium phosphate 39.99 mmol in D5W 250 mL IVPB, 39.99 mmol, Intravenous, PRN, Jon Hernandez MD         Objective:      Physical Exam:  Vitals: Temp: 98 °F (36.7 °C) (07/17/24 1101)  Pulse: 99 (07/17/24 1101)  Resp: 20 (07/17/24 1101)  BP: (!) 106/55 (07/17/24 1101)  SpO2: 100 % (07/17/24 1101)    Physical Exam  Constitutional:       Appearance: She is ill-appearing.      Interventions: She is sedated and intubated.   Cardiovascular:      Rate and Rhythm: Tachycardia present.   Pulmonary:      Effort: She is intubated.      Breath sounds: Rhonchi present.   Neurological:      General: No focal deficit present.               Labs:   Creatinine   Date Value Ref Range Status   07/17/2024 6.6 (H) 0.5 - 1.4 mg/dL Final   07/17/2024 7.1 (H) 0.5 - 1.4 mg/dL Final     POC Creatinine   Date Value Ref Range Status   03/12/2021 1.1 0.5 - 1.4 mg/dL Final      Hemoglobin   Date Value Ref Range Status   07/17/2024 9.4 (L) 12.0 - 16.0 g/dL Final      Albumin   Date Value Ref Range Status   07/17/2024 1.6 (L) 3.5 - 5.2 g/dL Final   07/17/2024 1.6 (L) 3.5 - 5.2 g/dL Final   07/16/2024 1.6 (L) 3.5 - 5.2 g/dL Final      Additional 17 min time spent on a voluntary advance care  planning and /or goals of care discussion, providing emotional support, formulating, and communicating prognosis and exploring burden/benefit of various approaches of treatment.     Thank you for the opportunity to care for this patient and family.       Liza Pandey, CNS

## 2024-07-17 NOTE — PT/OT/SLP PROGRESS
"Speech Language Pathology Treatment/Discharge Summary    Patient Name:  Juhi Taylor   MRN:  [id number]  Admitting Diagnosis: ESRD (end stage renal disease)    Recommendations:                 General Recommendations:  Follow up not indicated  Diet recommendations:  Regular Diet - IDDSI Level 7, Liquid Diet Level: Thin liquids - IDDSI Level 0   Aspiration Precautions: 1 bite/sip at a time, Alternating bites/sips, Assistance with meals, Avoid talking while eating, Feed only when awake/alert, HOB to 90 degrees, Meds whole 1 at a time, Monitor for s/s of aspiration, Small bites/sips, and Strict aspiration precautions   General Precautions: Standard, aspiration, fall  Communication strategies:  none    Assessment:     Juhi Taylor is a 74 y.o. female who presents with a functional swallow for PO intake of an unmodified diet. SLP to continue to follow.      Subjective     Spoke with nursing prior to session. Pt found resting in bed upon SLP entry into room. Daughter present during session. Pt agreeable to participate in all aspects of session given MOD verbal encouragement.      Pain/Comfort:  Pain Rating 1: 0/10    Respiratory Status: Room air    Objective:     Has the patient been evaluated by SLP for swallowing?   Yes  Keep patient NPO? No   Current Respiratory Status:        Pt seen for ongoing dysphagia therapy. Pt awake and with good levels of alertness throughout session for completion of PO intake without need for ongoing cueing. She consumed self-regulated sips of thin liquids via cup sip x4 and self-regulated bites of ana rosa crackers x3 without evidence of airway compromise. Vocal quality with slightly decreased volume though no changes to voice during or after oral intake. SLP provided education regarding overall impressions, continuation of current regular diet with thin liquids, safe swallow strategies, and ongoing SLP POC. Daughter endorsed she feels pt continues to "get stronger" each day and " was in agreement with discharge from speech services; reviewed signs of decreased tolerance of PO and that additional consults could be placed should diet tolerance decrease. Pt and family verbalized understanding and had no additional questions or concerns upon SLP exit.       Goals:   Multidisciplinary Problems       SLP Goals       Not on file              Multidisciplinary Problems (Resolved)          Problem: SLP    Goal Priority Disciplines Outcome   SLP Goal   (Resolved)     SLP Met   Description: Speech Language Pathology Goals  Goals expected to be met by 7/18    1. Pt will participate in ongoing swallow assessment                               Plan:     Patient to be seen:  3 x/week   Plan of Care expires:     Plan of Care reviewed with:  patient, daughter   SLP Follow-Up:  No       Discharge recommendations:  No Therapy Indicated     Time Tracking:     SLP Treatment Date:   07/17/24  Speech Start Time:  0930  Speech Stop Time:  0940     Speech Total Time (min):  10 min    Billable Minutes: Treatment Swallowing Dysfunction 10      07/17/2024

## 2024-07-17 NOTE — SUBJECTIVE & OBJECTIVE
Interval History: Juhi is laying in bed this am, feels sleepy. Otherwise no major complaints. Discussed w her and family the need for her tunnel cath to be replaced.    Review of patient's allergies indicates:   Allergen Reactions    Percocet [oxycodone-acetaminophen] Itching    Januvia [sitagliptin]     Jardiance [empagliflozin]      Leg cramps    Lipitor [atorvastatin] Other (See Comments)     Severe leg pain    Linaclotide Other (See Comments) and Nausea And Vomiting     Does not remember    Lubiprostone Other (See Comments) and Palpitations     Does not remember     Current Facility-Administered Medications   Medication Frequency    acetaminophen tablet 650 mg Q6H PRN    albuterol sulfate nebulizer solution 2.5 mg Q4H PRN    amiodarone tablet 400 mg BID    Followed by    [START ON 7/22/2024] amiodarone tablet 200 mg Daily    apixaban tablet 5 mg BID    aspirin EC tablet 81 mg Daily    dextromethorphan-guaiFENesin  mg/5 ml liquid 5 mL Q4H PRN    dextrose 10% bolus 125 mL 125 mL PRN    dextrose 10% bolus 250 mL 250 mL PRN    gentamicin 0.1 % ointment TID    glucagon (human recombinant) injection 1 mg PRN    glucose chewable tablet 16 g PRN    glucose chewable tablet 24 g PRN    heparin (porcine) injection 1,000 Units PRN    heparin (porcine) injection 5,000 Units PRN    metoprolol tartrate (LOPRESSOR) tablet 25 mg BID    midodrine tablet 10 mg Daily PRN    mupirocin 2 % ointment BID    naloxone 0.4 mg/mL injection 0.02 mg PRN    ondansetron injection 4 mg Q6H PRN    simethicone chewable tablet 80 mg TID PRN    sodium chloride 0.9% flush 10 mL PRN       Objective:     Vital Signs (Most Recent):  Temp: 98.3 °F (36.8 °C) (07/17/24 0301)  Pulse: 102 (07/17/24 0801)  Resp: 18 (07/17/24 0701)  BP: 130/60 (07/17/24 0801)  SpO2: 98 % (07/17/24 0801) Vital Signs (24h Range):  Temp:  [98.3 °F (36.8 °C)-99.3 °F (37.4 °C)] 98.3 °F (36.8 °C)  Pulse:  [] 102  Resp:  [18] 18  SpO2:  [93 %-100 %] 98 %  BP:  ()/(53-79) 130/60  Arterial Line BP: ()/(47-57) 94/47     Weight: 128.5 kg (283 lb 4.7 oz) (07/15/24 1006)  Body mass index is 44.37 kg/m².  Body surface area is 2.46 meters squared.    I/O last 3 completed shifts:  In: 544.4 [I.V.:544.4]  Out: 0      Physical Exam  Constitutional:       General: She is not in acute distress.     Appearance: Normal appearance. She is obese.   HENT:      Head: Normocephalic and atraumatic.      Mouth/Throat:      Mouth: Mucous membranes are moist.      Pharynx: Oropharynx is clear.   Cardiovascular:      Rate and Rhythm: Normal rate and regular rhythm.      Heart sounds: Normal heart sounds.   Pulmonary:      Effort: Pulmonary effort is normal.      Breath sounds: Normal breath sounds.   Abdominal:      Palpations: Abdomen is soft.   Musculoskeletal:      Right lower leg: Edema (2+) present.      Left lower leg: Edema (2+) present.      Comments: Small amount of weeping in lower extremities.    Skin:     General: Skin is warm and dry.   Neurological:      General: No focal deficit present.      Mental Status: She is alert. She is disoriented.      Motor: Weakness present.          Significant Labs:  All labs within the past 24 hours have been reviewed.     Significant Imaging:  All imaging with the past 24 hours have been reviewed.

## 2024-07-17 NOTE — PT/OT/SLP PROGRESS
Physical Therapy Co-Treatment    Patient Name:  Juhi Taylor   MRN:  [id number]  Admitting Diagnosis:  ESRD (end stage renal disease)   Recent Surgery: Procedure(s) (LRB):  REMOVAL, CATHETER, DIALYSIS, PERITONEAL (N/A)  BRONCHOSCOPY, FLEXIBLE (N/A) 2 Days Post-Op  Admit Date: 7/3/2024  Length of Stay: 14 days    Recommendations:     Discharge Recommendations: Moderate Intensity Therapy  Discharge Equipment Recommendations: bedside commode, wheelchair, walker, rolling, hospital bed   Barriers to discharge:  increased assist needed    Appropriate transfer level with nursing staff: bed level therex    Plan:     During this hospitalization, patient to be seen 4 x/week to address the identified rehab impairments via gait training, therapeutic activities, therapeutic exercises, neuromuscular re-education and progress towards the established goals.  Plan of Care Expires:  08/15/24  Plan of Care Reviewed with: patient, daughter    Assessment     Juhi Taylor is a 74 y.o. female admitted with a medical diagnosis of ESRD (end stage renal disease). Pt tolerated treatment session well today. Session today focused on further progression of functional transfers and mobility to increase pt's musculoskeletal strength and cardiopulmonary endurance. She showed good progress in these areas today as seh was able to sit EOB for an increased amount of time without LOB as well as progress functional mobility from sitting activity to standing activity. She required total assist x 2 persons today to reach standing, however. Pt's weakness and generalized deconditioning is due to her bedrest from complex medical conditions as well as decreased mobility due to illness PTA. She remains very motivated to improve and progress. Pt will continue to benefit from skilled PT services during this hospital admit to continue to improve transfer ability and efficiency as well as continue to progress pt's cardiopulmonary endurance to maximize  pt's functional independence and return to PLOF. Patient continues to demonstrate the need for moderate intensity therapy on a daily basis post acute exhibited by decreased independence with functional mobility    Problem List: impaired endurance, weakness, impaired self care skills, impaired functional mobility, impaired balance, gait instability, decreased upper extremity function, decreased lower extremity function, decreased safety awareness, pain, impaired cardiopulmonary response to activity, impaired coordination.  Rehab Prognosis: Good; patient would benefit from acute skilled PT services to address these deficits and reach maximum level of function.      Goals:   Multidisciplinary Problems       Physical Therapy Goals          Problem: Physical Therapy    Goal Priority Disciplines Outcome Goal Variances Interventions   Physical Therapy Goal     PT, PT/OT Progressing     Description: PT goals to be met by: 8/15/24    Patient will perform rolling each way with minimum assistance  Patient will perform supine <> sitting with minimum assistance  Patient will perform sit <> stand transitions with maximal assistance  Patient will perform transfers from bed <> chair or BSC with maximal assistance using LRAD  Patient will perform standing for 60 seconds with maximal assistance using RW or LRAD    DME Justifications (see above for complete DME recommendations)    Bedside Commode- Patient has a mobility limitation that significantly impairs their ability to participate in one or more mobility related activities of daily living, including toileting. This deficit can be resolved by using a bedside commode. Patient demonstrates mobility limitations that will cause them to be confined to one room at home without bathroom access for up to 30 days. Using a bedside commode will greatly improve the patient's ability to participate in MRADLs.     Rolling Walker- Patient demonstrates a mobility limitation that significantly  "impairs their ability to participate in one or more mobility related activities of daily living. Patient's mobility limitation cannot be sufficiently resolved with the use of a cane, but can be sufficiently resolved with the use of a rolling walker.The use of a rolling walker will considerably improve their ability to participate in MRADLs. Patient will use the walker on a regular basis at home.      Wheelchair-  Patient has a mobility limitation that significantly impairs their ability to participate in one or more mobility related activities of daily living in customary locations in the home. The mobility limitation cannot be sufficiently resolved by the use of a cane or walker. The use of a manual wheelchair will greatly improve the patient's ability to participate in MRADLs. The patient will use the wheelchair on a regular basis at home. They have expressed their willingness to use a manual wheelchair in the home, and have a caregiver who is available and willing to assist with the wheelchair if needed.     Hospital Bed ·       Patient requires a hospital bed for positioning of the body in ways that are not feasible with an ordinary bed. The patient requires special positioning for pain relief, limited mobility, and/or being unable to independently make changes in body position without the use of a hospital bed. Pillows and wedges will not be adequate for resolving these positional issues.                         Subjective     RN notified prior to session. Daughter present upon PT entrance into room. Patient agreeable to PT evaluation.    Chief Complaint: Pt with no c/o pain. Reports "I feel like I could keep sleeping"  Patient/Family Comments/goals: pt and dtr both reporting goal for pt to go somewhere to get stronger prior to d/c  Pain/Comfort:  Pain Rating 1: 0/10  Pain Rating Post-Intervention 1: 0/10    Objective:     Additional staff present: OT; OT for cotx due to pt's multiple medical comorbidities and " "functional deficits req'ing two skilled therapists to appropriately progress pt's musculoskeletal strength, neuromuscular control, and endurance while taking into consideration severe medical acuity in the ICU    Patient found HOB elevated with: telemetry, blood pressure cuff, pulse ox (continuous), PureWick (HD catheter)   Cognition:   Alert and Cooperative  Patient is oriented to Person, Place, Time, Situation  Command following: Follows multistep verbal commands  Fluency: clear/fluent  General Precautions: Standard, Cardiac aspiration, fall   Orthopedic Precautions:N/A   Braces: N/A   Body mass index is 44.37 kg/m².  Oxygen Device: Room Air  Vitals: BP (!) 102/50 (BP Location: Right forearm, Patient Position: Lying)   Pulse 95   Temp 98.1 °F (36.7 °C) (Oral)   Resp 18   Ht 5' 7" (1.702 m)   Wt 128.5 kg (283 lb 4.7 oz)   SpO2 96%   Breastfeeding No   BMI 44.37 kg/m²     Outcome Measures:  AM-PAC 6 CLICK MOBILITY  Turning over in bed (including adjusting bedclothes, sheets and blankets)?: 2  Sitting down on and standing up from a chair with arms (e.g., wheelchair, bedside commode, etc.): 2  Moving from lying on back to sitting on the side of the bed?: 2  Moving to and from a bed to a chair (including a wheelchair)?: 1  Need to walk in hospital room?: 1  Climbing 3-5 steps with a railing?: 1  Basic Mobility Total Score: 9     Functional Mobility:    Bed Mobility:   Rolling/Turning to Right: moderate assistance and with side rail for LE management and for trunk management  Scooting to HOB via supine bridge: total assistance and 2 persons   Supine to Sit: maximal assistance and 2 persons for LE management and for trunk management; to Rt side of bed  Scooting anteriorly to EOB to have both feet planted on floor: total assistance   Sit to Supine: total assistance and 2 persons for LE management and for trunk management; to Rt side of bed    Sitting Balance at Edge of Bed:  Static Sitting Balance: Good- : able to " accept minimal resistance  Dynamic Sitting Balance: Good- : able to sit unsupported and weight shift across midline minimally  Assistance Level Required: Minimal Assistance progressing to Stand-by Assistance  Time: 12 minutes  Postural deviations noted: slouched posture, rounded shoulders, forward head, and trunk deviated right  Comments: Time EOB focused primarily on tolerance to upright positioning, cardiopulmonary response and endurance for activities, and strength of postural musculature to perform dynamic sitting to prepare for tasks in the home. Pt initially requiring min A for sitting balance with noted Rt trunk lean. However after adjusting hips to place feet on floor as well as use of LUE to maintain balance she was able to progress to SBA. Pt able to accept internal and external perturbations to balance while seated EOB with appropriate trunk response to remain upright with no LOB and LUE support. Able to perform reaches inside [facility].    Transfers:   Sit <> Stand Transfer: maximal assistance and of 2 persons with hand-held assist. [id number] from EOB  Able to reach 75% of full stand    Standing Balance:  Static Standing Balance: Poor-: requires maximal assistance and UE support to maintain standing balance without loss  Dynamic Standing Balance: Poor : able to stand with moderate assistance and minimally reach ipsilaterally. Unable to cross midline.  Assistance Level Required: maximal assistance and of 2 persons  Patient used: hand-held assist   Time: ~15 seconds  Postural deviations noted: slouched posture, rounded shoulders, and forward head  Comments: Pt req'd max x 2 to assist with anterior weight shift, promotion of full hip ext, and to maintain balance. Unable to bring trunk fully upright and reached 75% of full stand. May benefit from RW in future standing trials to help facilitate full trunk upright via use of RW and UE support.      Gait: Deferred due to pt's performance with above listed functional  mobility    Education:  Time provided for education, counseling and discussion of health disposition in regards to patient's current status  All questions answered within PT scope of practice and to patient's satisfaction  PT role in POC to address current functional deficits  Pt educated on proper body mechanics, safety techniques, and energy conservation with PT facilitation and cueing throughout session  Whiteboard updated with therapist name and pt's current mobility status documented above  Encouraged patient to perform daily exercises of ankle pumps, glute sets, SAQ to increase endurance and decrease effects of bedrest     Patient left HOB elevated with all lines intact, call button in reach, RN notified, and dtr present.    Time Tracking:     PT Received On: 07/17/24  PT Start Time: 0935     PT Stop Time: 0959  PT Total Time (min): 24 min     Billable Minutes:   Therapeutic Activity 10 minutes and Therapeutic Exercise 14 minutes    Treatment Type: Treatment  PT/PTA: PT       7/17/2024

## 2024-07-17 NOTE — PROGRESS NOTES
[facility]  Nephrology  Progress Note    Patient Name: Juhi Taylor  MRN: [id number]  Admission Date: 7/3/2024  Hospital Length of Stay: 14 days  Attending Provider: Kristin Alvarado MD   Primary Care Physician: Tony Sadler MD  Principal Problem:ESRD (end stage renal disease)    Subjective:     HPI: 74-year-old female with prior history of CKD stage 5 with recent PD catheter placement on 6/4, RCC s/p right nephrectomy, T2DM, obesity, CAD s/p PCI to Lcx and LAD in 11/2020 and HFmREF who is admitted as a transfer from [facility] for higher level of care and further cardiac evaluation. Nephrology consulted for hemodialysis.     She initially presented on 6/18/24 to [facility] for hypervolemia in the setting of CKD 4-5 for which she had undergone elective PD catheter placement complicated by catheter site leaking. She had a complex hospital course, was initially placed on furosemide gtt without significant improvement and later underwent placement of tunneled line for HD with volume removal. During her second dialysis session she went into PEA arrest for which she was resuscitated, intubated and transferred to ICU. She was noted to have elevated troponin and subsequent TTE found reduced EF from 40-45% to 20-25% for which coronary angiography was recommended. Her hospital course was also complicated by dialysis line infection, significant bleeding around the catheter site, bradycardia and subsequent atrial fibrillation with RVR. Given the catheter site bleeding, anticoagulation was temporarily held. She has reported allergy to amiodarone, was initially placed on diltiazem for atrial fibrillation despite reduced EF. She discussed GOC at outside hospital where it appears that she lacked capacity per note and decided [place] [facility] [place] At that time, gen surgery was planning to place a left tunneled catheter due to concerns of the right side not being suitable    Upon admit, patient  appeared drowsy but conversant. Daughter reports that she typically gets lethargic 2/2 to the volume overload due to not receiving dialysis. Patient reported b/l left lower extremity pain. Daughter concerned about her not having dialysis in 4 days and wants a session today.     Interval History: Juhi is laying in bed this am, feels sleepy. Otherwise no major complaints. Discussed w her and family the need for her tunnel cath to be replaced.    Review of patient's allergies indicates:   Allergen Reactions    Percocet [oxycodone-acetaminophen] Itching    Januvia [sitagliptin]     Jardiance [empagliflozin]      Leg cramps    Lipitor [atorvastatin] Other (See Comments)     Severe leg pain    Linaclotide Other (See Comments) and Nausea And Vomiting     Does not remember    Lubiprostone Other (See Comments) and Palpitations     Does not remember     Current Facility-Administered Medications   Medication Frequency    acetaminophen tablet 650 mg Q6H PRN    albuterol sulfate nebulizer solution 2.5 mg Q4H PRN    amiodarone tablet 400 mg BID    Followed by    [START ON 7/22/2024] amiodarone tablet 200 mg Daily    apixaban tablet 5 mg BID    aspirin EC tablet 81 mg Daily    dextromethorphan-guaiFENesin  mg/5 ml liquid 5 mL Q4H PRN    dextrose 10% bolus 125 mL 125 mL PRN    dextrose 10% bolus 250 mL 250 mL PRN    gentamicin 0.1 % ointment TID    glucagon (human recombinant) injection 1 mg PRN    glucose chewable tablet 16 g PRN    glucose chewable tablet 24 g PRN    heparin (porcine) injection 1,000 Units PRN    heparin (porcine) injection 5,000 Units PRN    metoprolol tartrate (LOPRESSOR) tablet 25 mg BID    midodrine tablet 10 mg Daily PRN    mupirocin 2 % ointment BID    naloxone 0.4 mg/mL injection 0.02 mg PRN    ondansetron injection 4 mg Q6H PRN    simethicone chewable tablet 80 mg TID PRN    sodium chloride 0.9% flush 10 mL PRN       Objective:     Vital Signs (Most Recent):  Temp: 98.3 °F (36.8 °C) (07/17/24  0301)  Pulse: 102 (07/17/24 0801)  Resp: 18 (07/17/24 0701)  BP: 130/60 (07/17/24 0801)  SpO2: 98 % (07/17/24 0801) Vital Signs (24h Range):  Temp:  [98.3 °F (36.8 °C)-99.3 °F (37.4 °C)] 98.3 °F (36.8 °C)  Pulse:  [] 102  Resp:  [18] 18  SpO2:  [93 %-100 %] 98 %  BP: ()/(53-79) 130/60  Arterial Line BP: ()/(47-57) 94/47     Weight: 128.5 kg (283 lb 4.7 oz) (07/15/24 1006)  Body mass index is 44.37 kg/m².  Body surface area is 2.46 meters squared.    I/O last 3 completed shifts:  In: 544.4 [I.V.:544.4]  Out: 0      Physical Exam  Constitutional:       General: She is not in acute distress.     Appearance: Normal appearance. She is obese.   HENT:      Head: Normocephalic and atraumatic.      Mouth/Throat:      Mouth: Mucous membranes are moist.      Pharynx: Oropharynx is clear.   Cardiovascular:      Rate and Rhythm: Normal rate and regular rhythm.      Heart sounds: Normal heart sounds.   Pulmonary:      Effort: Pulmonary effort is normal.      Breath sounds: Normal breath sounds.   Abdominal:      Palpations: Abdomen is soft.   Musculoskeletal:      Right lower leg: Edema (2+) present.      Left lower leg: Edema (2+) present.      Comments: Small amount of weeping in lower extremities.    Skin:     General: Skin is warm and dry.   Neurological:      General: No focal deficit present.      Mental Status: She is alert. She is disoriented.      Motor: Weakness present.          Significant Labs:  All labs within the past 24 hours have been reviewed.     Significant Imaging:  All imaging with the past 24 hours have been reviewed.  Assessment/Plan:     Renal/  * ESRD (end stage renal disease)  Nephrology consulted for inpatient-HD management  admitted as a transfer for acute HFrEF in setting of CKD5 and recent PEA arrest during dialysis. Recent PD catheter placement on 6/4 but concerned for a possibly leak from cath. Unable to initiate dialysis for 2 weeks PTA to OSH due to insurance issues. Hospital  course complicated by infected tunneled cath removed on 6/30. Transferred here for higher level of care. Initial plan at OSH was to replace tunneled catheter on the left side due to concerns to that right side may not by suitable. PD was initiated here in hospital however we were unable to remove significant amounts of fluid, At this time the daughter wishes to proceed with HD.     Plan/Recommendations  - Had difficulty w dialysis yesterday due to clotting. Repeat SLED today. Planning for replacement of tunnel cath w interventional nephrology.   - Tunnel cath placed 7/11.  - Still having clotting issues with HD, Cath flow,TPA, heparin.   - Per interventional nephrology recommendation will need 5000IU/ml Heparin lock in each port with each dialysis session.   - use 1500 IU Heparin with the priming of the machine   - Renally adjust medications  - Pre and Post-HD weights   - Continue to monitor intake and output           Thank you for your consult. I will follow-up with patient. Please contact us if you have any additional questions.    Paulo Alicea MD  Nephrology  [facility]

## 2024-07-17 NOTE — PROGRESS NOTES
"[facility]  Adult Nutrition  Progress Note    SUMMARY       Recommendations    Continue Renal diet (texture per SLP); add Novasource ONS BID if PO intake declines.  RD to monitor & follow-up.    Goals: Meet % EEN, EPN by RD f/u date  Nutrition Goal Status: new  Communication of RD Recs: discussed on rounds    Assessment and Plan    No nutrition dx at this time.     Reason for Assessment    Reason For Assessment: length of stay  Diagnosis: other (see comments) (ESRD)  Relevant Medical History: CKD5, DM  Interdisciplinary Rounds: attended    General Information Comments: Pt extubated yesterday, diet advanced per SLP. Pt tolerating diet w/ fair appetite. Per 7/2 RD assessment, pt reported good appetite PTA & UBW of 290#. Pt w/ no indicators of malnutrition. Noted palliative care following. HD being discussed.   Nutrition Discharge Planning: Adequate PO intake    Nutrition/Diet History    Spiritual, Cultural Beliefs, Synagogue Practices, Values that Affect Care: no  Food Allergies: NKFA  Factors Affecting Nutritional Intake: decreased appetite    Anthropometrics    Temp: 98 °F (36.7 °C)  Height: 5' 7" (170.2 cm)  Height (inches): 67 in  Weight Method: Bed Scale  Weight: 128.5 kg (283 lb 4.7 oz)  Weight (lb): 283.29 lb  Ideal Body Weight (IBW), Female: 135 lb  % Ideal Body Weight, Female (lb): 209.84 %  BMI (Calculated): 44.4  BMI Grade: greater than 40 - morbid obesity    Lab/Procedures/Meds    Pertinent Labs Reviewed: reviewed  Pertinent Labs Comments: Creat 7.1, GFR 5.7, P 5.8, A1C 6.8  Pertinent Medications Reviewed: reviewed    Estimated/Assessed Needs    Weight Used For Calorie Calculations: 128.5 kg (283 lb 4.7 oz)    Energy Calorie Requirements (kcal): 1828 kcal/d  Energy Need Method: Nedrow-St Jeor (1.0 PAL)    Protein Requirements: 129 g/d (1 g/kg)  Weight Used For Protein Calculations: 128.5 kg (283 lb 4.7 oz)    Estimated Fluid Requirement Method: other (see comments) (Per MD)  RDA " Method (mL): 1828    Nutrition Prescription Ordered    Current Diet Order: Renal    Evaluation of Received Nutrient/Fluid Intake    I/O: -    Comments: LBM: 7/15    Tolerance: tolerating    Nutrition Risk    Level of Risk/Frequency of Follow-up:  (1x/week)     Monitor and Evaluation    Food and Nutrient Intake: food and beverage intake, energy intake  Food and Nutrient Adminstration: diet order  Physical Activity and Function: nutrition-related ADLs and IADLs  Anthropometric Measurements: weight, weight change  Biochemical Data, Medical Tests and Procedures: glucose/endocrine profile, inflammatory profile, lipid profile, gastrointestinal profile, electrolyte and renal panel  Nutrition-Focused Physical Findings: overall appearance     Nutrition Follow-Up    RD Follow-up?: Yes

## 2024-07-17 NOTE — PLAN OF CARE
Hospital Medicine Plan of Care Note    Pt to stepdown to HM per ICU staff. HM will assume care upon arrival to the floor.      Manuel Gutierrez MD  Attending Physician  Medical Director - [facility]  Department of Hospital Medicine  7/17/2024

## 2024-07-18 PROBLEM — I48.91 ATRIAL FIBRILLATION AND FLUTTER: Status: ACTIVE | Noted: 2024-07-18

## 2024-07-18 PROBLEM — I48.92 ATRIAL FIBRILLATION AND FLUTTER: Status: ACTIVE | Noted: 2024-07-18

## 2024-07-18 NOTE — PLAN OF CARE
MICU DAILY GOALS     Family/Goals of care/Code Status   Code Status: DNR    24H Vital Sign Range  Temp:  [97.5 °F (36.4 °C)-98.5 °F (36.9 °C)]   Pulse:  []   Resp:  [17-20]   BP: ()/(50-73)   SpO2:  [93 %-100 %]      Shift Events (include procedures and significant events)   No acute events throughout shift    AWAKE RASS: Goal - RASS Goal: 0-->alert and calm  Actual - RASS (Jha Agitation-Sedation Scale): alert and calm    Restraint necessity: Not necessary   BREATHE SBT: Not intubated    Coordinate A & B, analgesics/sedatives Pain: managed   SAT: Not intubated   Delirium CAM-ICU: Overall CAM-ICU: Negative   Early(intubated/ Progressive (non-intubated) Mobility MOVE Screen (INTUBATED ONLY): Not intubated    Activity: Activity Management: Rolling - L1   Feeding/Nutrition Diet order: Diet/Nutrition Received: consistent carb/diabetic diet, Specialty Diet/Nutrition Received: renal diet   Thrombus DVT prophylaxis: VTE Required Core Measure: Pharmacological prophylaxis initiated/maintained   HOB Elevation Head of Bed (HOB) Positioning: HOB at 30 degrees   Ulcer Prophylaxis GI: yes   Glucose control managed Glycemic Management: blood glucose monitored   Skin Skin assessed during: Q Shift Change    Sacrum intact/not altered? Yes  Heels intact/not altered? Yes  Surgical wound? No    CHECK ONE!   (no altered skin or altered skin) and sub boxes:  [x] No Altered Skin Integrity Present    [x]Prevention Measures Documented    [] Altered Skin Integrity Present or Discovered   [] LDA present in EPIC, daily doc completed              [] LDA added if not in EPIC (describe wound).                    When describing wound, do not stage, use descriptive words only.    [] Wound Image Taken (required on admit,                   transfer/discharge and every Tuesday)    Wound Care Consulted? No    4 EYES:  Attending Nurse (1st set of eyes):     Second RN/Staff Member (2nd set of eyes):    Bowel Function no issues     Indwelling Catheter Necessity      [REMOVED] Trialysis (Dialysis) Catheter 07/08/24 2210 left internal jugular-Line Necessity Review: CRRT/HD       Hemodialysis Catheter 07/11/24 1219 left subclavian-Line Necessity Review: CRRT/HD     De-escalation Antibiotics No        VS and assessment per flow sheet, patient progressing towards goals as tolerated, plan of care reviewed with family, all concerns addressed, will continue to monitor.

## 2024-07-18 NOTE — PROGRESS NOTES
[facility]  Critical Care Medicine  Progress Note    Patient Name: Juhi Taylor  MRN: [id number]  Admission Date: 7/3/2024  Hospital Length of Stay: 15 days  Code Status: DNR  Attending Provider: Kristin Alvarado MD  Primary Care Provider: Tony Sadler MD   Principal Problem: ESRD (end stage renal disease)    Subjective:     HPI:  Juhi Taylor is a 74-year-old female with a past medical history of CKD stage 5 with recent PD catheter placement on 6/4, RCC s/p right nephrectomy, T2DM, obesity, CAD s/p PCI to Lcx and LAD in 11/2020 and HFmREF who is admitted as a transfer from [facility] for higher level of care and further cardiac evaluation. Nephrology consulted for hemodialysis. She initially presented on 6/18/24 to [facility] for hypervolemia in the setting of CKD 4-5 for which she had undergone elective PD catheter placement complicated by catheter site leaking. She had a complex hospital course, was initially placed on furosemide gtt without significant improvement and later underwent placement of tunneled line for HD with volume removal. During her second dialysis session she went into PEA arrest for which she was resuscitated, intubated and transferred to ICU. She was noted to have elevated troponin and subsequent TTE found reduced EF from 40-45% to 20-25% for which coronary angiography was recommended. Her hospital course was also complicated by dialysis line infection, significant bleeding around the catheter site, bradycardia and subsequent atrial fibrillation with RVR. Given the catheter site bleeding, anticoagulation was temporarily held. She has reported allergy to amiodarone, was initially placed on diltiazem for atrial fibrillation despite reduced EF. She discussed GOC at outside hospital where it appears that she lacked capacity per note and [place] [facility] [place] At that time, gen surgery was planning to place a left tunneled catheter due to concerns  of the right side not being suitable     Upon admit, patient appeared drowsy but conversant. Daughter reports that she typically gets lethargic 2/2 to the volume overload due to not receiving dialysis. Patient reported b/l left lower extremity pain. Daughter concerned about her not having dialysis in 4 days and wants a session today.     Pt initially refusing dialysis. Seen by nephrology. VBG 7.220/33.4/34/13.7/-14. Critical care medicine consulted for emergent trialysis line placement and dialysis.     Hospital/ICU Course:  Ms. Taylor was admitted to MICU on 7/8 for urgent dialysis line placement and initiated of HD. Patient received short run of CRRT overnight without issue. Encephalopathy improving with continued CRRT treatments. Persistent AFib with RVR, received Digoxin loading dose (renally dosed), restarted PO Carvedilol. Patient remains in persistent AFib RVR despite therapeutic digoxin level and uptitration of Carvedilol levels, started on Amiodarone infusion overnight. Transitioned to amio PO, started on heparin gtt for afib AC. Tolerated HD well overnight without complications and less encephalopathic upon exam. Planning for gen surg to remove PD catheter. Upon induction pt vomited copious amounts of bile, was intubated. PD cathter removed successfully. Extubated to NC 7/16.     7/18:  Patient made DNR after Palliative Care discussions yesterday.  Tolerated SLED 12 hours overnight.      Interval History/Significant Events: No acute events noted overnight.  Tolerated SLED 12 hours overnight.  Niece at the bedside.    Review of Systems   All other systems reviewed and are negative.    Objective:     Vital Signs (Most Recent):  Temp: 97.5 °F (36.4 °C) (07/18/24 0300)  Pulse: 104 (07/18/24 0600)  Resp: 18 (07/17/24 1846)  BP: (!) 104/52 (07/18/24 0600)  SpO2: 97 % (07/18/24 0600) Vital Signs (24h Range):  Temp:  [97.5 °F (36.4 °C)-98.5 °F (36.9 °C)] 97.5 °F (36.4 °C)  Pulse:  [] 104  Resp:  [17-20]  18  SpO2:  [92 %-100 %] 97 %  BP: ()/(50-73) 104/52   Weight: 128.5 kg (283 lb 4.7 oz)  Body mass index is 44.37 kg/m².      Intake/Output Summary (Last 24 hours) at 7/18/2024 0812  Last data filed at 7/18/2024 0602  Gross per 24 hour   Intake 3088.69 ml   Output 2298 ml   Net 790.69 ml          Physical Exam  Vitals and nursing note reviewed.   Constitutional:       General: She is not in acute distress.     Appearance: Normal appearance. She is obese. She is not ill-appearing, toxic-appearing or diaphoretic.   HENT:      Head: Normocephalic and atraumatic.      Right Ear: External ear normal.      Left Ear: External ear normal.      Nose: Nose normal.   Cardiovascular:      Rate and Rhythm: Tachycardia present. Rhythm irregular.      Pulses: Normal pulses.      Heart sounds: Normal heart sounds. No murmur heard.     No friction rub. No gallop.   Pulmonary:      Effort: Pulmonary effort is normal. No respiratory distress.      Breath sounds: Normal breath sounds. No wheezing, rhonchi or rales.   Abdominal:      General: Abdomen is flat. Bowel sounds are normal. There is no distension.      Palpations: Abdomen is soft.      Tenderness: There is no abdominal tenderness. There is no guarding or rebound.   Musculoskeletal:         General: No swelling or tenderness. Normal range of motion.   Skin:     General: Skin is warm and dry.      Coloration: Skin is not jaundiced or pale.   Neurological:      General: No focal deficit present.      Mental Status: She is alert and oriented to person, place, and time. Mental status is at baseline.   Psychiatric:         Mood and Affect: Mood normal.         Behavior: Behavior normal.         Thought Content: Thought content normal.         Judgment: Judgment normal.            Vents:  Vent Mode: Spont (07/16/24 1003)  Set Rate: 15 BPM (07/16/24 0903)  Vt Set: 420 mL (07/16/24 0903)  Pressure Support: 5 cmH20 (07/16/24 1003)  PEEP/CPAP: 5 cmH20 (07/16/24 1003)  Oxygen  Concentration (%): 30 (07/16/24 1003)  Peak Airway Pressure: 10 cmH20 (07/16/24 1003)  Plateau Pressure: 0 cmH20 (07/16/24 1003)  Total Ve: 5.45 L/m (07/16/24 1003)  Negative Inspiratory Force (cm H2O): 0 (07/16/24 1003)  Lines/Drains/Airways       Central Venous Catheter Line  Duration                  Hemodialysis Catheter 07/11/24 1219 left subclavian 6 days              Drain  Duration             Female External Urinary Catheter w/ Suction 07/15/24 1404 2 days              Peripheral Intravenous Line  Duration                  Midline Catheter - Single Lumen 07/12/24 0430 Right basilic vein (medial side of arm) 6 days         Peripheral IV - Single Lumen 07/12/24 1700 20 G 1 3/4 in Anterior;Distal;Right Forearm 5 days                  Significant Labs:    CBC/Anemia Profile:  Recent Labs   Lab 07/17/24  0431 07/18/24  0321   WBC 22.96* 20.51*   HGB 9.4* 8.8*   HCT 30.2* 26.3*    208   MCV 82 77*   RDW 20.7* 20.5*        Chemistries:  Recent Labs   Lab 07/17/24  0431 07/17/24  1330 07/17/24  2209 07/18/24  0321   *  133* 132*  132* 134* 135*  135*   K 5.2*  5.0 4.8  4.8 4.3 4.4  4.4     102 102  102 104 106  106   CO2 18*  18* 18*  18* 22* 22*  22*   BUN 62*  61* 66*  66* 29* 18  18   CREATININE 6.6*  7.1* 6.9*  6.9* 3.6* 2.4*  2.4*   CALCIUM 7.9*  7.8* 8.0*  8.0* 7.6* 7.4*  7.4*   ALBUMIN 1.6*  1.6* 1.5*  1.5* 1.5* 1.5*  1.4*   PROT 4.8*  --   --  4.3*   BILITOT 0.7  --   --  1.0   ALKPHOS 144*  --   --  128   ALT 7*  --   --  <5*   AST 27  --   --  15   MG 2.3 2.2  2.2 2.0 1.8   PHOS 5.8*  5.8* 5.8*  5.8* 2.9 2.4*  2.3*       All pertinent labs within the past 24 hours have been reviewed.    Significant Imaging:  I have reviewed all pertinent imaging results/findings within the past 24 hours.    ABG  Recent Labs   Lab 07/15/24  1557   PH 7.314*   PO2 92   PCO2 38.3   HCO3 19.5*   BE -7*     Assessment/Plan:     Pulmonary  Acute respiratory failure with  hypoxia  Patient with Hypoxic Respiratory failure which is Acute.  she is not on home oxygen. Supplemental oxygen was provided and noted-      .   Signs/symptoms of respiratory failure include- respiratory distress. Contributing diagnoses includes - Aspiration Labs and images were reviewed. Patient Has recent ABG, which has been reviewed. Will treat underlying causes and adjust management of respiratory failure as follows-     -- Extubated this am 07/16  -- Pulmonary toilet    Cardiac/Vascular  Atrial fibrillation with RVR  AFib with RVR appears new following cardiac arrest at OSH. Per chart review, AFib was difficult to control. Patient endorses amiodarone reaction with itching, rash, and oral burning. Refused Sotalol, DCCV, and AICD placement at OSH. Was placed on Diltizem infusion and transitioned to PO metoprolol. Amio gtt restarted overnight 7/11 s/t uncontrolled afib rvr. Transitioned to PO amio.    - D/C heparin gtt  - Restart apixaban  - Daily CMP  - Keep K>4, Mag >2    Acute on chronic combined systolic and diastolic heart failure  Echo    Left Ventricle: The left ventricle is moderately dilated. Normal wall thickness. Global hypokinesis present. There is severely reduced systolic function with a visually estimated ejection fraction of 20 - 25%.    Right Ventricle: Mild right ventricular enlargement. Wall thickness is normal. Right ventricle wall motion has global hypokinesis. Systolic function is moderately reduced.    Left Atrium: Left atrium is severely dilated.    Right Atrium: Right atrium is severely dilated.    Aortic Valve: The aortic valve is a trileaflet valve. There is mild aortic valve sclerosis.    Mitral Valve: There is mild regurgitation.    Tricuspid Valve: There is severe regurgitation.    Pulmonary Artery: There is moderate pulmonary hypertension. The estimated pulmonary artery systolic pressure is 61 mmHg.    IVC/SVC: Elevated venous pressure at 15 mmHg.    Pericardium: There is a trivial  circumferential effusion. No indication of cardiac tamponade.    - Will require interventional cardiology for LHC vs PET for reduced EF once euvolemic  - LifeVest prior to discharge  - Continue Entresto 24-26mg BID per cardiology recs    Essential hypertension  - Changed home Carvedilol to Metoprolol 25mg BID per cardiology recs    Renal/  * ESRD (end stage renal disease)  Pt previously on PD. Unable to remove enough volume with PD catheter. Initially upgraded to MICU for emergent trialysis placement and CRRT. Now with tunneled dialysis catheter. Tolerated session of HD on 7/12 without complications.     - Nephrology following, appreciate recs  - Strict I/Os  - Renally dose meds  - Avoid nephrotoxic agents  - PD catheter removed 07/15  - Tolerated HD session overnight 07/15    Endocrine  Type 2 diabetes mellitus with microalbuminuria, without long-term current use of insulin  Latest A1C 6.8; on Metformin at home    - POC glucose  - Hypoglycemia protocol    Palliative Care  Palliative care encounter  - Palliative care following, continued GOC with family. Plan for family meeting today 07/17    ACP (advance care planning)  - Palliative care consulted, appreciate their assistance in clarification of GOC/POC with patient and family            Kristin Alvarado M.D.  Rapid Response/Critical Care  Department of Pulmonary Medicine  [facility]        N.B.: Portions of this note was dictated using M*Modal Fluency Direct--there may be voice recognition errors occasionally missed on review.

## 2024-07-18 NOTE — ASSESSMENT & PLAN NOTE
Patient with Hypoxic Respiratory failure which is Acute.  she is not on home oxygen. Supplemental oxygen was provided and noted-      .   Signs/symptoms of respiratory failure include- respiratory distress. Contributing diagnoses includes - Aspiration Labs and images were reviewed. Patient Has recent ABG, which has been reviewed. Will treat underlying causes and adjust management of respiratory failure as follows-     -- Extubated this am 07/16  -- Pulmonary toilet

## 2024-07-18 NOTE — ASSESSMENT & PLAN NOTE
Echo    Left Ventricle: The left ventricle is moderately dilated. Normal wall thickness. Global hypokinesis present. There is severely reduced systolic function with a visually estimated ejection fraction of 20 - 25%.    Right Ventricle: Mild right ventricular enlargement. Wall thickness is normal. Right ventricle wall motion has global hypokinesis. Systolic function is moderately reduced.    Left Atrium: Left atrium is severely dilated.    Right Atrium: Right atrium is severely dilated.    Aortic Valve: The aortic valve is a trileaflet valve. There is mild aortic valve sclerosis.    Mitral Valve: There is mild regurgitation.    Tricuspid Valve: There is severe regurgitation.    Pulmonary Artery: There is moderate pulmonary hypertension. The estimated pulmonary artery systolic pressure is 61 mmHg.    IVC/SVC: Elevated venous pressure at 15 mmHg.    Pericardium: There is a trivial circumferential effusion. No indication of cardiac tamponade.    - Will require interventional cardiology for LHC vs PET for reduced EF once euvolemic  - LifeVest prior to discharge  - Continue Entresto 24-26mg BID per cardiology recs

## 2024-07-18 NOTE — PROGRESS NOTES
07/18/24 0320   Treatment   Treatment Type SLED   Treatment Status Discontinued treatment;Blood returned   Dialysis Machine Number [id number]   Dialyzer Time (hours) 12.01   BVP (Liters) 102 L   Access Tunneled Cath;Left;IJ   CRRT Comments SLED completed   CRRT Hourly Documentation   Total UF (Hourly Cleared) (mL) 51     SLED for 12 hours completed. Heparin gtt discontinued. Blood returned. Lines disconnected aseptically. L IJ tunneled CVC flushed with saline and heparin locked.  Lumens capped and wrapped in sterile gauze and labelled. Machine disinfected. Report given to primary nurse.

## 2024-07-18 NOTE — PROGRESS NOTES
"Advance Care Planning   [facility]  Palliative Care       Patient Name: Juhi Taylor  MRN: [id number]  Admission Date: 7/3/2024  Hospital Length of Stay: 15 days  Code Status: DNR   Attending Provider: Kristin Alvarado MD  Palliative Care Provider:   Primary Care Physician: Tony Sadler MD  Principal Problem:ESRD (end stage renal disease);    LCSW, along with TAI ARZATE, met with pt and her two dtrs, Nell and Maria Luisa, at bedside to f/u on GOC. Pt's son Dawood was present for the conversation via phone. Pt AAOx3, in fair spirits, denying discomfort. Nephro team just leaving upon our arrival. Pt engaged in a voluntary conversation regarding GOC. TAI ARZATE shared with dtrs that the purpose of the meeting was for pt to share her wishes so that further treatment will be aligned with her goals, without question to what those are.    Pt shared that nephro informed her that her current tunneled cath is not working effectively and would have to be replaced if she chooses to continue with dialysis. Pt states that she is agreeable to dialysis today, but immediately shared that she is tired, hates being in the hospital, and recognizes that no procedure is without risk. Pt states that she is only now learning about all of the procedures, catheters, lines, etc, that she has had over the last few weeks. On her own volition, without prompting, pt stated that she wants to be a DNR. Pt very clearly stated that she understands that a DNR is a do not resuscitate order, and  that if it's her time to die, she wants to be allowed to die. Pt was extremely clear that she wanted this to be her decision and "no one else's." This was repeated a few times, and obviously very important to the pt. Pt is a very faithful and Christian woman who stated that she's "not afraid to die," and shared profound feelings such as "death is a part of life," and that God states "do not fear, for I have redeemed you." " "Pt told her dtrs that her death will hurt, but they will have to keep moving forward. Pt clearly states that she would not want to live on machines and be sentenced to a life of long term nursing home care.    Pt stated that she does not want to have any further procedures and wants to go home. However, before we could explore more about these wishes, pt's dtr Nell got very emotional and angered about the conversation, the direction it was headed, palliative's involvement with the pt, and accusing palliative of trying to coerce pt. Dtr shared that she felt going home and not pursuing dialysis further is "suicide." Dtr then told pt that she cannot care for her at home because she will "kill her back." Dtr Maria Luisa pledged to pt that she would care for her, in addition to other family members, and that if she wishes to go home, she has people to support her. Hospice briefly explained to pt/family, however, dtjolene Camejo continued to get elevated and agitated, and pt requested that the conversation end, as it would no longer be productive with her dtr being in the state that she was in. LCSW provided support and shared encouraging words, and informed her that we would return at a later time to continue the conversation so that we can ensure that her wishes are clearly stated and being honored. Pt states that she will take the time to think further about whether or not she wants to have another line placed or go home with hospice. NIITF.    Tiff Guan, BANDARW-BACS, [facility]-  Department of Palliative Medicine   "

## 2024-07-18 NOTE — PLAN OF CARE
Problem: Sepsis/Septic Shock  Goal: Optimal Coping  Outcome: Progressing  Goal: Absence of Bleeding  Outcome: Progressing  Goal: Blood Glucose Level Within Targeted Range  Outcome: Progressing  Goal: Absence of Infection Signs and Symptoms  Outcome: Progressing  Goal: Optimal Nutrition Intake  Outcome: Progressing     Problem: Diabetes Comorbidity  Goal: Blood Glucose Level Within Targeted Range  Outcome: Progressing     Problem: Coping Ineffective  Goal: Effective Coping  Outcome: Progressing

## 2024-07-18 NOTE — NURSING
..Nurses Note -- 4 Eyes      7/18/2024   2:14 PM      Skin assessed during: Transfer      [] No Altered Skin Integrity Present    []Prevention Measures Documented      [x] Yes- Altered Skin Integrity Present or Discovered   [] LDA Added if Not in Epic (Describe Wound)   [x] New Altered Skin Integrity was Present on Admit and Documented in LDA   [] Wound Image Taken    Wound Care Consulted? Yes    Attending Nurse:  RUDDY Mathias     Second RN/Staff Member:  NEENA De Jesus           V-Y Plasty Text: The defect edges were debeveled with a #15 scalpel blade.  Given the location of the defect, shape of the defect and the proximity to free margins an V-Y advancement flap was deemed most appropriate.  Using a sterile surgical marker, an appropriate advancement flap was drawn incorporating the defect and placing the expected incisions within the relaxed skin tension lines where possible.    The area thus outlined was incised deep to adipose tissue with a #15 scalpel blade.  The skin margins were undermined to an appropriate distance in all directions utilizing iris scissors.

## 2024-07-18 NOTE — ASSESSMENT & PLAN NOTE
Nephrology consulted for inpatient-HD management  admitted as a transfer for acute HFrEF in setting of CKD5 and recent PEA arrest during dialysis. Recent PD catheter placement on 6/4 but concerned for a possibly leak from cath. Unable to initiate dialysis for 2 weeks PTA to OSH due to insurance issues. Hospital course complicated by infected tunneled cath removed on 6/30. Transferred here for higher level of care. Initial plan at OSH was to replace tunneled catheter on the left side due to concerns to that right side may not by suitable. PD was initiated here in hospital however we were unable to remove significant amounts of fluid, At this time the daughter wishes to proceed with HD.     Plan/Recommendations  - No dialysis today. Plan for HD trial session w current tunnel cath tomorrow.   - Planning for replacement of tunnel cath w interventional nephrology, likely next week.  - Tunnel cath placed 7/11 - arterial port suctioning on vessel wall. Will need to be replaced.   -Iron studies ordered  -Bedside US shows narrow IJ vasculature  - Still having clotting issues with HD, Cath flow,TPA, heparin.   - Per interventional nephrology recommendation will need 5000IU/ml Heparin lock in each port with each dialysis session.   - use 1500 IU Heparin with the priming of the machine   - Renally adjust medications  - Pre and Post-HD weights   - Continue to monitor intake and output

## 2024-07-18 NOTE — SUBJECTIVE & OBJECTIVE
Interval History: Juhi is laying in bed this morning, no major complaints at this time, denies feeling short of breath. Discussed w family at bedside.     Review of patient's allergies indicates:   Allergen Reactions    Percocet [oxycodone-acetaminophen] Itching    Januvia [sitagliptin]     Jardiance [empagliflozin]      Leg cramps    Lipitor [atorvastatin] Other (See Comments)     Severe leg pain    Linaclotide Other (See Comments) and Nausea And Vomiting     Does not remember    Lubiprostone Other (See Comments) and Palpitations     Does not remember     Current Facility-Administered Medications   Medication Frequency    0.9%  NaCl infusion (CRRT USE ONLY) Continuous    acetaminophen tablet 650 mg Q6H PRN    amiodarone tablet 400 mg BID    Followed by    [START ON 7/22/2024] amiodarone tablet 200 mg Daily    aspirin EC tablet 81 mg Daily    dextromethorphan-guaiFENesin  mg/5 ml liquid 5 mL Q4H PRN    dextrose 10% bolus 125 mL 125 mL PRN    dextrose 10% bolus 250 mL 250 mL PRN    glucagon (human recombinant) injection 1 mg PRN    glucose chewable tablet 16 g PRN    glucose chewable tablet 24 g PRN    heparin (porcine) injection 1,000 Units PRN    heparin (porcine) injection 5,000 Units PRN    heparin 25,000 units in dextrose 5% 250 mL (100 units/mL) infusion (heparin infusion - NO NOMOGRAM) Continuous    metoprolol tartrate (LOPRESSOR) tablet 25 mg BID    miconazole NITRATE 2 % top powder BID    midodrine tablet 10 mg Daily PRN    midodrine tablet 5 mg TID    mupirocin 2 % ointment BID    naloxone 0.4 mg/mL injection 0.02 mg PRN    ondansetron injection 4 mg Q6H PRN    simethicone chewable tablet 80 mg TID PRN    sodium chloride 0.9% flush 10 mL PRN    sodium chloride 0.9% flush 10 mL PRN       Objective:     Vital Signs (Most Recent):  Temp: 98.2 °F (36.8 °C) (07/18/24 0701)  Pulse: 105 (07/18/24 1001)  Resp: 18 (07/18/24 0829)  BP: (!) 108/54 (07/18/24 1001)  SpO2: 95 % (07/18/24 1001) Vital Signs (24h  Range):  Temp:  [97.5 °F (36.4 °C)-98.5 °F (36.9 °C)] 98.2 °F (36.8 °C)  Pulse:  [] 105  Resp:  [17-20] 18  SpO2:  [92 %-100 %] 95 %  BP: ()/(50-73) 108/54     Weight: 128.5 kg (283 lb 4.7 oz) (07/17/24 1209)  Body mass index is 44.37 kg/m².  Body surface area is 2.46 meters squared.    I/O last 3 completed shifts:  In: 3088.7 [P.O.:480; I.V.:2463.4; IV Piggyback:145.3]  Out: 2298 [Other:2298]     Physical Exam  Constitutional:       General: She is not in acute distress.     Appearance: Normal appearance. She is obese.   HENT:      Head: Normocephalic and atraumatic.      Mouth/Throat:      Mouth: Mucous membranes are moist.      Pharynx: Oropharynx is clear.   Cardiovascular:      Rate and Rhythm: Normal rate and regular rhythm.      Heart sounds: Normal heart sounds.   Pulmonary:      Effort: Pulmonary effort is normal.      Breath sounds: Normal breath sounds.   Abdominal:      Palpations: Abdomen is soft.   Musculoskeletal:      Right lower leg: Edema (2+) present.      Left lower leg: Edema (2+) present.      Comments: Small amount of weeping in lower extremities.    Skin:     General: Skin is warm and dry.   Neurological:      General: No focal deficit present.      Mental Status: She is alert. She is disoriented.      Motor: Weakness present.          Significant Labs:  All labs within the past 24 hours have been reviewed.     Significant Imaging:  All imaging with the past 24 hours have been reviewed.

## 2024-07-18 NOTE — SUBJECTIVE & OBJECTIVE
Interval History/Significant Events: No acute events noted overnight.  Tolerated SLED 12 hours overnight.  Niece at the bedside.    Review of Systems   All other systems reviewed and are negative.    Objective:     Vital Signs (Most Recent):  Temp: 97.5 °F (36.4 °C) (07/18/24 0300)  Pulse: 104 (07/18/24 0600)  Resp: 18 (07/17/24 1846)  BP: (!) 104/52 (07/18/24 0600)  SpO2: 97 % (07/18/24 0600) Vital Signs (24h Range):  Temp:  [97.5 °F (36.4 °C)-98.5 °F (36.9 °C)] 97.5 °F (36.4 °C)  Pulse:  [] 104  Resp:  [17-20] 18  SpO2:  [92 %-100 %] 97 %  BP: ()/(50-73) 104/52   Weight: 128.5 kg (283 lb 4.7 oz)  Body mass index is 44.37 kg/m².      Intake/Output Summary (Last 24 hours) at 7/18/2024 0812  Last data filed at 7/18/2024 0602  Gross per 24 hour   Intake 3088.69 ml   Output 2298 ml   Net 790.69 ml          Physical Exam  Vitals and nursing note reviewed.   Constitutional:       General: She is not in acute distress.     Appearance: Normal appearance. She is obese. She is not ill-appearing, toxic-appearing or diaphoretic.   HENT:      Head: Normocephalic and atraumatic.      Right Ear: External ear normal.      Left Ear: External ear normal.      Nose: Nose normal.   Cardiovascular:      Rate and Rhythm: Tachycardia present. Rhythm irregular.      Pulses: Normal pulses.      Heart sounds: Normal heart sounds. No murmur heard.     No friction rub. No gallop.   Pulmonary:      Effort: Pulmonary effort is normal. No respiratory distress.      Breath sounds: Normal breath sounds. No wheezing, rhonchi or rales.   Abdominal:      General: Abdomen is flat. Bowel sounds are normal. There is no distension.      Palpations: Abdomen is soft.      Tenderness: There is no abdominal tenderness. There is no guarding or rebound.   Musculoskeletal:         General: No swelling or tenderness. Normal range of motion.   Skin:     General: Skin is warm and dry.      Coloration: Skin is not jaundiced or pale.   Neurological:       General: No focal deficit present.      Mental Status: She is alert and oriented to person, place, and time. Mental status is at baseline.   Psychiatric:         Mood and Affect: Mood normal.         Behavior: Behavior normal.         Thought Content: Thought content normal.         Judgment: Judgment normal.            Vents:  Vent Mode: Spont (07/16/24 1003)  Set Rate: 15 BPM (07/16/24 0903)  Vt Set: 420 mL (07/16/24 0903)  Pressure Support: 5 cmH20 (07/16/24 1003)  PEEP/CPAP: 5 cmH20 (07/16/24 1003)  Oxygen Concentration (%): 30 (07/16/24 1003)  Peak Airway Pressure: 10 cmH20 (07/16/24 1003)  Plateau Pressure: 0 cmH20 (07/16/24 1003)  Total Ve: 5.45 L/m (07/16/24 1003)  Negative Inspiratory Force (cm H2O): 0 (07/16/24 1003)  Lines/Drains/Airways       Central Venous Catheter Line  Duration                  Hemodialysis Catheter 07/11/24 1219 left subclavian 6 days              Drain  Duration             Female External Urinary Catheter w/ Suction 07/15/24 1404 2 days              Peripheral Intravenous Line  Duration                  Midline Catheter - Single Lumen 07/12/24 0430 Right basilic vein (medial side of arm) 6 days         Peripheral IV - Single Lumen 07/12/24 1700 20 G 1 3/4 in Anterior;Distal;Right Forearm 5 days                  Significant Labs:    CBC/Anemia Profile:  Recent Labs   Lab 07/17/24  0431 07/18/24  0321   WBC 22.96* 20.51*   HGB 9.4* 8.8*   HCT 30.2* 26.3*    208   MCV 82 77*   RDW 20.7* 20.5*        Chemistries:  Recent Labs   Lab 07/17/24  0431 07/17/24  1330 07/17/24  2209 07/18/24  0321   *  133* 132*  132* 134* 135*  135*   K 5.2*  5.0 4.8  4.8 4.3 4.4  4.4     102 102  102 104 106  106   CO2 18*  18* 18*  18* 22* 22*  22*   BUN 62*  61* 66*  66* 29* 18  18   CREATININE 6.6*  7.1* 6.9*  6.9* 3.6* 2.4*  2.4*   CALCIUM 7.9*  7.8* 8.0*  8.0* 7.6* 7.4*  7.4*   ALBUMIN 1.6*  1.6* 1.5*  1.5* 1.5* 1.5*  1.4*   PROT 4.8*  --   --  4.3*   BILITOT  0.7  --   --  1.0   ALKPHOS 144*  --   --  128   ALT 7*  --   --  <5*   AST 27  --   --  15   MG 2.3 2.2  2.2 2.0 1.8   PHOS 5.8*  5.8* 5.8*  5.8* 2.9 2.4*  2.3*       All pertinent labs within the past 24 hours have been reviewed.    Significant Imaging:  I have reviewed all pertinent imaging results/findings within the past 24 hours.

## 2024-07-18 NOTE — PROGRESS NOTES
" Palliative Medicine  Consult Note       Patient Name: Juhi Taylor   MRN: [id number]   Admission Date: 7/3/2024   Hospital Length of Stay: 15   Attending Provider: Bubba Ochoa MD   Consulting Provider: SEAMUS Gautam  Primary Care Physician: Tony Sadler MD   Principal Problem: ESRD (end stage renal disease)     Patient information was obtained from relative(s), past medical records, and ER records.           Assessment/Plan:    CHF with complicated cardiac history.  Patient does not want further intervention and wants to go home. Youngest daughter wants to continue treatments.  Will need sequential treatments in efforts to be "better" but no one is convinced that she has the functional status to get "better".  ESRD does not want HD catheter placed. Will ask Nephro if PD can be restarted.    Palliative Care Encounter:  Impression:  Elderly woman with multiple medical problems including end-stage renal disease, worsening heart failure as well as significant dysrhythmias.  Patient says that she wants to go home however it is unclear if she understands that going home means that she will have a limited life span.  In her current date I am not sure she has a candidate for either peritoneal or hemodialysis.     As of 7/16/24 pt has been at [facility] for 14 days (as transfer from OSH). She has received tunneled cath, tolerated dialysis, had PD cath removed in OR resulting in aspiration and intubation w/ pressors overnight. She was extubated and aaox3. However, pt's tunneled cath is not working properly. This am nephrology discussed with pt and 2 daughters at  that pt will need exchange of tunneled cath if she wishes to continue with dialysis.     Palliative care consulted for goals of care and decision making.       Advance Care Planning   Advance Directives:   Living Will: No    Do Not Resuscitate Status: Yes    Medical Power of : No      Decision Making:  Family answered questions and " "Patient unable to communicate due to disease severity/cognitive impairment  Goals of Care: What is most important right now is to focus on care that is providing pt independence and comfort. Accordingly, we have decided that the best plan to meet the patient's goals includes continuing with treatment, with limits to invasive therapies.         7/18/24: Visited with Ms. Taylor this am who reports she is feeling well this am, sharing she slept well and has no acute distress/pain noted today. Shares she is supposed to step down to hospital floor today and voices wish to go outside of hospital room today. Discussed possibility of "sunshine therapy" with RN at bs who verbalizes she will try to do that and is reaching out to pt for possible utilization of valeria lift. Kris, April, is at bs with pt this am and requests that [facility] does not continue to visit with pt, sharing she feels that it is stressing her out.   -Upon chart review this afternoon, nephrology note suggests that pt is agreeable to replacement of tunneled cath, likely next week.         7/17/24: Myself and Tiff ANAND ([facility]) met with pt this morning. Present were pt's 2 daughters, Nell and Maria Luisa and pt's son, Dawood, was on speaker phone for meeting. Pt is aaox3 and willing to participate in conversation.   -Introduced that goal of meeting was for pt to share her wishes and values relating to medical care so that her children may hear.   -Led discussion on what is important to pt, she shares that her independence is important and she would not value a life in which she would be on machines and/or living in a nursing facility for the rest of her life.  -Without prompting pt verbalized that she would like to be a DNR, for clarity purposes I asked her what that means. She reported that it means "Do not resuscitate". She began discussing that she is very henrry filled and shares that if it's her time to die, she is ready.  -Explored her understanding of " "nephrology discussion with her this morning previous to our conversation. She shares (correctly so) that her new line (tunneled catheter) is not working and that if she wants to continue with dialysis she will need a new line placed. She shares that she is tired of having procedures and is unclear at this time if she would like to have a new line placed. Of note, nephrology is planing to dialyze her again today with current line, at slower rate. She is agreeable to dialysis today.   -Began discussion that if she does not want new line placed and does not wish to continue with dialysis, home with hospice is another option. Pt's  and son were on hospice and she is familiar with hospice concept and goals of care.   -She shares that she very much wants to be comfortable at end of life. She does not know yet if she will decided to have tunneled catheter replaced yet.   -Dawood was present on phone for majority of conversation and shared that he will respect his mothers wishes upon hanging up. (Was on phone when pt stated DNR wish)  -Daughters at  were mostly respectful of conversation and pt's wishes. Nell was upset toward end of conversation, feel that [facility] was only present to "harass" pt and family. Reiterated that [facility] goal is to understand pt's wishes so that all care going forward is in alignment with her goals, no matter what they are.   -Nell shares with her mother that she does not feel she could care for her mom at home if she were on hospice. Maria Luisa reassured mother that she would be present to care for her, either at pt's home or at her own home, if pt decided to utilize hospice.   -Nell was very upset at end of meeting verbalizing that she feels we are all trying to coerce her mother into dying.   -Communicated conversation and pt's DNR wish with primary team.          - Prior experience with serious illness: yes  -The patient has previously engaged in advance care planning or GOC " discussions  - Insight/Understanding of illness: difficult decision making between family members.    Patient does not have a healthcare power of  but she has 3 of 4 living children.  Son Dawood and daughter Maria Luisa are in agreement however daughter francy is not willing to participate in the conversation.  But no decisions have been made    Life Limiting Diagnosis:  ESHD and ESRD  -Prognosis-Time and potential for recovery: poor  -Functional status: poor.  Pt was not able to manage ADLs  -Dementia diagnosis no      Symptom Management:  -Pain: no  -Debility: pt has been bed bound throughout duration of hospitalization. She was walking some prior. Family is understanding that pt will need LTAC/Rehab upon d/c.     -Dyspnea no    -Anxiety/Depression no    -Constipation: no    -Anorexia:yes      Summary of recommendations and follow up plan:  -Most important goals at this time: Further discussion about care plans and what might be beneficial   -Code status: Pt requesting DNR today.   -Disposition: continue current level of care.      Thank you for your consult. I will follow-up with patient. Please contact us if you have any additional questions.       Subjective:     Chief Complaint: No chief complaint on file.        HPI:   Ms. Taylor is a 73yo female with a PMHx of CAD, ESRD on PD, HFrEF of 40%, and afib on eliquis who was transferred to [facility] from [facility] for a higher level of care. She originally presented to [facility] ED on 6/18 for worsening SOB and was found to be satting 89% on RA determined to be due to a heart failure exacerbation. She was given lasix gtt and metolazone but still produced insufficient urine. HD was started, but during a dialysis session on 6/24 she suffered PEA cardiac arrest, epi was administered and she converted to V tach. After cardioversion ROSC was achieved and she was intubated and transferred to the ICU. Repeat Echo revealed an EF of 20-25% from 40% on admission but cardiology did not  intervene at this time to due to her clinical condition but recommended later evaluation for ICD and angiogram. Her condition improved with further HD. She was extubated and moved to the floor but developed an uptrending leukocytosis possibly originating from a left trialysis catheter with bruising and possible purulent discharge. The line was removed. She was determined to not be a candidate for hospice. The family requested transfer to [facility] for a higher level of care.       Hospital Course:  Since arrival to [facility], patient has refused interventions.  This morning in fact she refused taking her medications.  I was asked to see her to help delineate goals of care and further treatments.  Her son and oldest daughter Maria Luisa feel that the patient is nearing the end of her life.  Other daughter francy does not want to hear anything about this.  She also does not think that her mother is capable of making any decisions.  Patient was pretty clear about going home and yesterday apparently understood the ramifications of stopping all of her current treatments.    Review of Symptoms      Symptom Assessment (ESAS 0-10 Scale)  Pain:  0  Dyspnea:  0  Anxiety:  0  Nausea:  0  Depression:  0  Anorexia:  0  Fatigue:  0  Insomnia:  0  Restlessness:  0  Agitation:  0  Unable to complete assessment due to Mental status change     CAM / Delirium:  Negative  Constipation:  Negative  Diarrhea:  Negative      Bowel Management Plan (BMP):  Yes      Pain Assessment:  OME in 24 hours:  0  Location(s):      Pain Assessment in Advanced Demential Scale (PAINAD)   Breathing - Independent of vocalization:  0  Negative vocalization:  0  Facial expression:  0  Body language:  0  Consolability:  0  Total:  0    Modified Chago Scale:  0    Performance Status:  40    Living Arrangements:  Lives with family, Lives in home and Lives >50 miles from facility    Psychosocial/Cultural:   See Palliative Psychosocial Note: No  , 3 living  children.    of pancreatic cancer with hospice. Son  with hospice as well.  Daughter Nell lives with patient, daughter Criss and son Dawood /wife supportive. They do not want mom to suffer.  **Primary  to Follow**  Palliative Care  Consult: Yes    Spiritual:  F - Fely and Belief:  Non Oriental orthodox  I - Importance:  Yes  C - Community:  Unknown  A - Address in Care:  Unknown           ROS:  Review of Systems   Constitutional:  Positive for activity change, appetite change and fatigue.   Respiratory:  Positive for shortness of breath.    Cardiovascular:  Negative for chest pain.   Neurological:  Positive for weakness.         Past Medical History:   Diagnosis Date    Anticoagulant long-term use     Arthritis     Breast cancer     invasive lobular carcinoma    Cancer of kidney 2020    RIGHT KIDNEY CANCER    CHF (congestive heart failure)     Coronary artery disease dx     Depression     Diabetes mellitus     Diastolic heart failure secondary to hypertension     Gout     Hyperlipemia     Hypertension     Hypertrophy of nasal turbinates     Kidney mass     Right    Levoscoliosis     Lung nodule     left    Multiple thyroid nodules     NICOLE (obstructive sleep apnea)     uses C-PAP    Pulmonary hypertension     Severe sepsis 2024     Past Surgical History:   Procedure Laterality Date    AORTOGRAPHY N/A 2020    Procedure: Aortogram;  Surgeon: Paul Pedersen MD;  Location: [facility];  Service: Cardiology;  Laterality: N/A;    BREAST SURGERY      BRONCHOSCOPY N/A 7/15/2024    Procedure: BRONCHOSCOPY, FLEXIBLE;  Surgeon: Tanya Beckham MD;  Location: [facility] OR [address] [place] [address];  Service: General;  Laterality: N/A;    CARDIAC CATHETERIZATION  2020    CHOLECYSTECTOMY      COLONOSCOPY      multi -last      CORONARY ARTERY BYPASS GRAFT      ESOPHAGOGASTRODUODENOSCOPY      2012     HAND SURGERY Right     INSERTION OF CATHETER N/A 2024    Procedure:  "Insertion,catheter;  Surgeon: Meli Grfifin MD;  Location: [facility] OR;  Service: Vascular;  Laterality: N/A;  ORIGINAL CATHETER WAS REPOSITIONED.  NO NEW CATHETER WAS PLACED    INSERTION OF TUNNELED CENTRAL VENOUS HEMODIALYSIS CATHETER Right 7/11/2024    Procedure: Insertion, Catheter, Central Venous, Hemodialysis;  Surgeon: Hawa Landa MD;  Location: [facility] CATH LAB;  Service: Interventional Nephrology;  Laterality: Right;    INSERTION, CATHETER, DIALYSIS, PERITONEAL N/A 6/4/2024    Procedure: IN Insertion Catheter, Dialysis, Peritoneal - laparoscopic;  Surgeon: Rodriguez Catalan Jr., MD;  Location: [facility];  Service: General;  Laterality: N/A;    LAPAROSCOPIC ROBOT-ASSISTED SURGICAL REMOVAL OF KIDNEY USING DA CHELLE XI Right 03/10/2022    Procedure: XI ROBOTIC NEPHRECTOMY- radical;  Surgeon: Rolando Ramirez MD;  Location: [facility];  Service: Urology;  Laterality: Right;    MASTECTOMY W/ SENTINEL NODE BIOPSY Bilateral 01/21/2015    bilateral "dog ears"    NASAL SINUS SURGERY  2015    Dr Bryant FESS/cauterization turbinate     PARTIAL HYSTERECTOMY  1989    PERCUTANEOUS TRANSLUMINAL BALLOON ANGIOPLASTY OF CORONARY ARTERY  12/04/2020    Procedure: Angioplasty-coronary;  Surgeon: Paul Pedersen MD;  Location: [facility] CATH;  Service: Cardiology;;    PERITONEAL CATHETER REMOVAL N/A 7/15/2024    Procedure: REMOVAL, CATHETER, DIALYSIS, PERITONEAL;  Surgeon: Tanya Beckham MD;  Location: [address];  Service: General;  Laterality: N/A;    REMOVAL OF HEMODIALYSIS CATHETER  7/11/2024    Procedure: REMOVAL, CATHETER, HEMODIALYSIS;  Surgeon: Hawa Landa MD;  Location: [facility] CATH LAB;  Service: Interventional Nephrology;;    RENAL BIOPSY Right     9/20/2021 EJ    TUBAL LIGATION      ULTRASOUND GUIDANCE  12/04/2020    Procedure: Ultrasound Guidance;  Surgeon: Paul Pedersen MD;  Location: [facility] CATH;  Service: Cardiology;;     Family History   Problem Relation Name Age of Onset    Breast cancer Mother      " Stroke Father Waqar Sr.     Hypertension Father Waqar Sr.     Hepatitis Brother      Asthma Daughter Nell Taylor     Birth defects Daughter Nell Camejo's two children has cleft lips    Depression Daughter Nell Taylor     Drug abuse Daughter Nell Taylor     Learning disabilities Daughter Nell Taylor     Mental illness Daughter Nell Taylor     Breast cancer Maternal Aunt      Glaucoma Sister      Drug abuse Daughter Nell     Macular degeneration Neg Hx      Retinal detachment Neg Hx           Review of patient's allergies indicates:   Allergen Reactions    Percocet [oxycodone-acetaminophen] Itching    Januvia [sitagliptin]     Jardiance [empagliflozin]      Leg cramps    Lipitor [atorvastatin] Other (See Comments)     Severe leg pain    Linaclotide Other (See Comments) and Nausea And Vomiting     Does not remember    Lubiprostone Other (See Comments) and Palpitations     Does not remember       Medications:    Current Facility-Administered Medications:     acetaminophen tablet 650 mg, 650 mg, Oral, Q6H PRN, Zakia Miranda, , 650 mg at 07/05/24 1550    amiodarone tablet 400 mg, 400 mg, Oral, BID, 400 mg at 07/18/24 0758 **FOLLOWED BY** [START ON 7/22/2024] amiodarone tablet 200 mg, 200 mg, Oral, Daily, Medina Clayton, AGACNP-BC    aspirin EC tablet 81 mg, 81 mg, Oral, Daily, Zakia Miranda DO, 81 mg at 07/18/24 0757    dextromethorphan-guaiFENesin  mg/5 ml liquid 5 mL, 5 mL, Oral, Q4H PRN, Zakia Miranda, DO, 5 mL at 07/03/24 1302    dextrose 10% bolus 125 mL 125 mL, 12.5 g, Intravenous, PRN, Jose Roberto Pruett Jr., MD    dextrose 10% bolus 250 mL 250 mL, 25 g, Intravenous, PRN, Jose Roberto Pruett Jr., MD    glucagon (human recombinant) injection 1 mg, 1 mg, Intramuscular, PRN, Zakia Miranda,     glucose chewable tablet 16 g, 16 g, Oral, PRN, Zakia Miranda, DO    glucose chewable tablet 24 g, 24 g, Oral, PRN, Zakia Miranda, DO    heparin (porcine) injection 1,000 Units,  1,000 Units, Intra-Catheter, PRN, Spencer Frank MD    heparin (porcine) injection 5,000 Units, 5,000 Units, Intra-Catheter, PRN, Paulo Alicea MD, 5,000 Units at 07/18/24 0327    heparin 25,000 units in dextrose 5% 250 mL (100 units/mL) infusion (heparin infusion - NO NOMOGRAM), 250 Units/hr, Intravenous, Continuous, Jon Hernandez MD, Stopped at 07/18/24 0320    miconazole NITRATE 2 % top powder, , Topical (Top), BID, Tasneem Ayers NP, Given at 07/18/24 0900    midodrine tablet 10 mg, 10 mg, Oral, Daily PRN, Tasneem Ayers NP, 10 mg at 07/17/24 1532    midodrine tablet 5 mg, 5 mg, Oral, TID, Tasneem Ayers, NP, 5 mg at 07/18/24 0758    mupirocin 2 % ointment, , Nasal, BID, Kristin Alvarado MD, Given at 07/17/24 2120    naloxone 0.4 mg/mL injection 0.02 mg, 0.02 mg, Intravenous, PRN, Zakia Miranda DO    ondansetron injection 4 mg, 4 mg, Intravenous, Q6H PRN, Zakia Miranda DO, 4 mg at 07/15/24 1227    simethicone chewable tablet 80 mg, 1 tablet, Oral, TID PRN, Zakia Miranda DO, 80 mg at 07/17/24 0858    sodium chloride 0.9% flush 10 mL, 10 mL, Intravenous, PRN, Hawa Landa MD    sodium chloride 0.9% flush 10 mL, 10 mL, Intravenous, PRN, Hawa Landa MD         Objective:      Physical Exam:  Vitals: Temp: 98.8 °F (37.1 °C) (07/18/24 1514)  Pulse: 99 (07/18/24 1514)  Resp: 20 (07/18/24 1514)  BP: (!) 122/56 (07/18/24 1514)  SpO2: (!) 93 % (07/18/24 1514)    Physical Exam  Constitutional:       Appearance: She is ill-appearing.      Interventions: She is sedated and intubated.   Cardiovascular:      Rate and Rhythm: Tachycardia present.   Pulmonary:      Effort: She is intubated.      Breath sounds: Rhonchi present.   Neurological:      General: No focal deficit present.                 Labs:   Creatinine   Date Value Ref Range Status   07/18/2024 2.4 (H) 0.5 - 1.4 mg/dL Final   07/18/2024 2.4 (H) 0.5 - 1.4 mg/dL Final     POC Creatinine   Date Value Ref Range Status    03/12/2021 1.1 0.5 - 1.4 mg/dL Final      Hemoglobin   Date Value Ref Range Status   07/18/2024 8.8 (L) 12.0 - 16.0 g/dL Final      Albumin   Date Value Ref Range Status   07/18/2024 1.5 (L) 3.5 - 5.2 g/dL Final   07/18/2024 1.4 (L) 3.5 - 5.2 g/dL Final   07/17/2024 1.5 (L) 3.5 - 5.2 g/dL Final      > 50% of  55 min visit spent in chart review, face to face discussion of goals of care,  symptom assessment, coordination of care, charting, and emotional support     Thank you for the opportunity to care for this patient and family.       Liza Pandey, CNS

## 2024-07-18 NOTE — PLAN OF CARE
[facility] - Cardiac Medical ICU  Discharge Reassessment    Primary Care Provider: Tony Sadler MD    Expected Discharge Date: 7/20/2024    Reassessment (most recent)       Discharge Reassessment - 07/18/24 1234          Discharge Reassessment    Assessment Type Discharge Planning Reassessment     Did the patient's condition or plan change since previous assessment? No     Discharge Plan discussed with: Patient     Communicated HARINI with patient/caregiver Date not available/Unable to determine     Discharge Plan A Home with family;Home Health     Discharge Plan B Home with family     DME Needed Upon Discharge  other (see comments)   TBD    Transition of Care Barriers None     Why the patient remains in the hospital Requires continued medical care        Post-Acute Status    Post-Acute Authorization Dialysis;Home Health     Post-Acute Placement Status Pending medical clearance/testing     Home Health Status Pending medical clearance/testing     Diaylsis Status Set-up Complete/Auth obtained     Coverage Medicare A & B and medicaid of [place]     Discharge Delays None known at this time                   Future Appointments   Date Time Provider Department Center   9/16/2024 11:20 AM [facility]   11/18/2024  8:30 AM LAB, [facility] LAB [place]   11/20/2024 11:00 AM [facility] DEXA1 [facility] [place]   11/25/2024 11:30 AM DEEJAY Adams PA-C [facility] ENDOCRN [place]     Patient not stable for discharge @ this time.  SW will continue to follow patient's progress to discharge from MICU. SW remains available for any family concerns or needs.    Discharge Plan A and Plan B have been determined by review of patient's clinical status, future medical and therapeutic needs, and coverage/benefits for post-acute care in coordination with multidisciplinary team members.    Deonte Kaur, ENRIQUE  [facility]  [phone]

## 2024-07-18 NOTE — PROGRESS NOTES
[facility]  Nephrology  Progress Note    Patient Name: Juhi Taylor  MRN: [id number]  Admission Date: 7/3/2024  Hospital Length of Stay: 15 days  Attending Provider: Kristin Alvarado MD   Primary Care Physician: Tony Sadler MD  Principal Problem:ESRD (end stage renal disease)    Subjective:     HPI: 74-year-old female with prior history of CKD stage 5 with recent PD catheter placement on 6/4, RCC s/p right nephrectomy, T2DM, obesity, CAD s/p PCI to Lcx and LAD in 11/2020 and HFmREF who is admitted as a transfer from [facility] for higher level of care and further cardiac evaluation. Nephrology consulted for hemodialysis.     She initially presented on 6/18/24 to [facility] for hypervolemia in the setting of CKD 4-5 for which she had undergone elective PD catheter placement complicated by catheter site leaking. She had a complex hospital course, was initially placed on furosemide gtt without significant improvement and later underwent placement of tunneled line for HD with volume removal. During her second dialysis session she went into PEA arrest for which she was resuscitated, intubated and transferred to ICU. She was noted to have elevated troponin and subsequent TTE found reduced EF from 40-45% to 20-25% for which coronary angiography was recommended. Her hospital course was also complicated by dialysis line infection, significant bleeding around the catheter site, bradycardia and subsequent atrial fibrillation with RVR. Given the catheter site bleeding, anticoagulation was temporarily held. She has reported allergy to amiodarone, was initially placed on diltiazem for atrial fibrillation despite reduced EF. She discussed GOC at outside hospital where it appears that she lacked capacity per note and decided to pursue higher level of care at [facility]. At that time, gen surgery was planning to place a left tunneled catheter due to concerns of the right side not being suitable    Upon admit, patient  appeared drowsy but conversant. Daughter reports that she typically gets lethargic 2/2 to the volume overload due to not receiving dialysis. Patient reported b/l left lower extremity pain. Daughter concerned about her not having dialysis in 4 days and wants a session today.     Interval History: Juhi is laying in bed this morning, no major complaints at this time, denies feeling short of breath. Discussed w family at bedside.     Review of patient's allergies indicates:   Allergen Reactions    Percocet [oxycodone-acetaminophen] Itching    Januvia [sitagliptin]     Jardiance [empagliflozin]      Leg cramps    Lipitor [atorvastatin] Other (See Comments)     Severe leg pain    Linaclotide Other (See Comments) and Nausea And Vomiting     Does not remember    Lubiprostone Other (See Comments) and Palpitations     Does not remember     Current Facility-Administered Medications   Medication Frequency    0.9%  NaCl infusion (CRRT USE ONLY) Continuous    acetaminophen tablet 650 mg Q6H PRN    amiodarone tablet 400 mg BID    Followed by    [START ON 7/22/2024] amiodarone tablet 200 mg Daily    aspirin EC tablet 81 mg Daily    dextromethorphan-guaiFENesin  mg/5 ml liquid 5 mL Q4H PRN    dextrose 10% bolus 125 mL 125 mL PRN    dextrose 10% bolus 250 mL 250 mL PRN    glucagon (human recombinant) injection 1 mg PRN    glucose chewable tablet 16 g PRN    glucose chewable tablet 24 g PRN    heparin (porcine) injection 1,000 Units PRN    heparin (porcine) injection 5,000 Units PRN    heparin 25,000 units in dextrose 5% 250 mL (100 units/mL) infusion (heparin infusion - NO NOMOGRAM) Continuous    metoprolol tartrate (LOPRESSOR) tablet 25 mg BID    miconazole NITRATE 2 % top powder BID    midodrine tablet 10 mg Daily PRN    midodrine tablet 5 mg TID    mupirocin 2 % ointment BID    naloxone 0.4 mg/mL injection 0.02 mg PRN    ondansetron injection 4 mg Q6H PRN    simethicone chewable tablet 80 mg TID PRN    sodium chloride  0.9% flush 10 mL PRN    sodium chloride 0.9% flush 10 mL PRN       Objective:     Vital Signs (Most Recent):  Temp: 98.2 °F (36.8 °C) (07/18/24 0701)  Pulse: 105 (07/18/24 1001)  Resp: 18 (07/18/24 0829)  BP: (!) 108/54 (07/18/24 1001)  SpO2: 95 % (07/18/24 1001) Vital Signs (24h Range):  Temp:  [97.5 °F (36.4 °C)-98.5 °F (36.9 °C)] 98.2 °F (36.8 °C)  Pulse:  [] 105  Resp:  [17-20] 18  SpO2:  [92 %-100 %] 95 %  BP: ()/(50-73) 108/54     Weight: 128.5 kg (283 lb 4.7 oz) (07/17/24 1209)  Body mass index is 44.37 kg/m².  Body surface area is 2.46 meters squared.    I/O last 3 completed shifts:  In: 3088.7 [P.O.:480; I.V.:2463.4; IV Piggyback:145.3]  Out: 2298 [Other:2298]     Physical Exam  Constitutional:       General: She is not in acute distress.     Appearance: Normal appearance. She is obese.   HENT:      Head: Normocephalic and atraumatic.      Mouth/Throat:      Mouth: Mucous membranes are moist.      Pharynx: Oropharynx is clear.   Cardiovascular:      Rate and Rhythm: Normal rate and regular rhythm.      Heart sounds: Normal heart sounds.   Pulmonary:      Effort: Pulmonary effort is normal.      Breath sounds: Normal breath sounds.   Abdominal:      Palpations: Abdomen is soft.   Musculoskeletal:      Right lower leg: Edema (2+) present.      Left lower leg: Edema (2+) present.      Comments: Small amount of weeping in lower extremities.    Skin:     General: Skin is warm and dry.   Neurological:      General: No focal deficit present.      Mental Status: She is alert. She is disoriented.      Motor: Weakness present.          Significant Labs:  All labs within the past 24 hours have been reviewed.     Significant Imaging:  All imaging with the past 24 hours have been reviewed.  Assessment/Plan:     Renal/  * ESRD (end stage renal disease)  Nephrology consulted for inpatient-HD management  admitted as a transfer for acute HFrEF in setting of CKD5 and recent PEA arrest during dialysis. Recent PD  catheter placement on 6/4 but concerned for a possibly leak from cath. Unable to initiate dialysis for 2 weeks PTA to OSH due to insurance issues. Hospital course complicated by infected tunneled cath removed on 6/30. Transferred here for higher level of care. Initial plan at OSH was to replace tunneled catheter on the left side due to concerns to that right side may not by suitable. PD was initiated here in hospital however we were unable to remove significant amounts of fluid, At this time the daughter wishes to proceed with HD.     Plan/Recommendations  - No dialysis today. Plan for HD trial session w current tunnel cath tomorrow.   - Planning for replacement of tunnel cath w interventional nephrology, likely next week.  - Tunnel cath placed 7/11 - arterial port suctioning on vessel wall. Will need to be replaced.   -Iron studies ordered  -Bedside US shows narrow IJ vasculature  - Still having clotting issues with HD, Cath flow,TPA, heparin.   - Per interventional nephrology recommendation will need 5000IU/ml Heparin lock in each port with each dialysis session.   - use 1500 IU Heparin with the priming of the machine   - Renally adjust medications  - Pre and Post-HD weights   - Continue to monitor intake and output           Thank you for your consult. I will follow-up with patient. Please contact us if you have any additional questions.    Paulo Alicea MD  Nephrology  [facility]

## 2024-07-19 NOTE — SUBJECTIVE & OBJECTIVE
Interval History: Juhi sitting up in bed this morning, wearing supplemental O2, eating breakfast w family member at bedside. Having back pain overnight.     Review of patient's allergies indicates:   Allergen Reactions    Percocet [oxycodone-acetaminophen] Itching    Januvia [sitagliptin]     Jardiance [empagliflozin]      Leg cramps    Lipitor [atorvastatin] Other (See Comments)     Severe leg pain    Linaclotide Other (See Comments) and Nausea And Vomiting     Does not remember    Lubiprostone Other (See Comments) and Palpitations     Does not remember     Current Facility-Administered Medications   Medication Frequency    0.9%  NaCl infusion (for blood administration) Q24H PRN    acetaminophen tablet 650 mg Q6H PRN    amiodarone tablet 400 mg BID    Followed by    [START ON 7/22/2024] amiodarone tablet 200 mg Daily    calcium gluconate 1 g in NS IVPB (premixed) Once    dextromethorphan-guaiFENesin  mg/5 ml liquid 5 mL Q4H PRN    dextrose 10% bolus 125 mL 125 mL PRN    dextrose 10% bolus 250 mL 250 mL PRN    glucagon (human recombinant) injection 1 mg PRN    glucose chewable tablet 16 g PRN    glucose chewable tablet 24 g PRN    lactated ringers bolus 250 mL Once    LIDOcaine 5 % patch 2 patch Q24H    miconazole NITRATE 2 % top powder BID    midodrine tablet 10 mg Daily PRN    midodrine tablet 10 mg TID    naloxone 0.4 mg/mL injection 0.02 mg PRN    ondansetron injection 4 mg Q6H PRN    simethicone chewable tablet 80 mg TID PRN    sodium chloride 0.9% flush 10 mL PRN    sodium chloride 0.9% flush 10 mL PRN       Objective:     Vital Signs (Most Recent):  Temp: 97.8 °F (36.6 °C) (07/19/24 1140)  Pulse: 90 (07/19/24 1140)  Resp: (!) 26 (07/19/24 1140)  BP: (!) 89/50 (07/19/24 1140)  SpO2: 100 % (07/19/24 1140) Vital Signs (24h Range):  Temp:  [97.8 °F (36.6 °C)-98.8 °F (37.1 °C)] 97.8 °F (36.6 °C)  Pulse:  [] 90  Resp:  [18-26] 26  SpO2:  [93 %-100 %] 100 %  BP: ()/(50-77) 89/50     Weight:  128.5 kg (283 lb 4.7 oz) (07/17/24 1209)  Body mass index is 44.37 kg/m².  Body surface area is 2.46 meters squared.    I/O last 3 completed shifts:  In: 2764 [I.V.:2515.4; IV Piggyback:248.6]  Out: 1857 [Other:1857]     Physical Exam  Constitutional:       General: She is not in acute distress.     Appearance: Normal appearance. She is obese.   HENT:      Head: Normocephalic and atraumatic.      Mouth/Throat:      Mouth: Mucous membranes are moist.      Pharynx: Oropharynx is clear.   Cardiovascular:      Rate and Rhythm: Normal rate and regular rhythm.      Heart sounds: Normal heart sounds.   Pulmonary:      Effort: Pulmonary effort is normal.      Breath sounds: Normal breath sounds.      Comments: Wearing nasal cannula  Abdominal:      Palpations: Abdomen is soft.   Musculoskeletal:      Right lower leg: Edema (2+) present.      Left lower leg: Edema (2+) present.      Comments: Small amount of weeping in lower extremities.    Skin:     General: Skin is warm and dry.   Neurological:      General: No focal deficit present.      Mental Status: She is alert. She is disoriented.      Motor: Weakness present.          Significant Labs:  All labs within the past 24 hours have been reviewed.     Significant Imaging:  All imaging with the past 24 hours have been reviewed.

## 2024-07-19 NOTE — PLAN OF CARE
Problem: Diabetes Comorbidity  Goal: Blood Glucose Level Within Targeted Range  Outcome: Progressing     Problem: Sepsis/Septic Shock  Goal: Optimal Coping  Outcome: Progressing  Goal: Absence of Bleeding  Outcome: Progressing  Goal: Blood Glucose Level Within Targeted Range  Outcome: Progressing  Goal: Absence of Infection Signs and Symptoms  Outcome: Progressing  Goal: Optimal Nutrition Intake  Outcome: Progressing     Problem: Bariatric Environmental Safety  Goal: Safety Maintained with Care  Outcome: Progressing      Patient A & O x 4. BP soft, MD aware. PO BP meds given. Calcium replaced ivbd. Patient supposed to transfer to MICU for Dialysis (SLED). Family aware of transfer and remain at bedside. Patient complaint of back and leg pain; PRN  medication given. X 3 loose BM's. Voices no other concerns at this time. Bed locked and low position. Call light within reach.

## 2024-07-19 NOTE — NURSING
End of Shift Summary:  Patient was uncomfortable and her back hurt.  Tylenol was given for pain.  Patient did not want any narcotics.  Refused am labs and asked if they could be done by dialysis through the dialysis catheter.  This information will be provided to dayshift nurse.

## 2024-07-19 NOTE — PROGRESS NOTES
[facility] ([place] [address])  Nephrology  Progress Note    Patient Name: Juhi Taylor  MRN: [id number]  Admission Date: 7/3/2024  Hospital Length of Stay: 16 days  Attending Provider: Bubba Ochoa MD   Primary Care Physician: Tony Sadler MD  Principal Problem:ESRD (end stage renal disease)    Subjective:     HPI: 74-year-old female with prior history of CKD stage 5 with recent PD catheter placement on 6/4, RCC s/p right nephrectomy, T2DM, obesity, CAD s/p PCI to Lcx and LAD in 11/2020 and HFmREF who is admitted as a transfer from [facility] for higher level of care and further cardiac evaluation. Nephrology consulted for hemodialysis.     She initially presented on 6/18/24 to [facility] for hypervolemia in the setting of CKD 4-5 for which she had undergone elective PD catheter placement complicated by catheter site leaking. She had a complex hospital course, was initially placed on furosemide gtt without significant improvement and later underwent placement of tunneled line for HD with volume removal. During her second dialysis session she went into PEA arrest for which she was resuscitated, intubated and transferred to ICU. She was noted to have elevated troponin and subsequent TTE found reduced EF from 40-45% to 20-25% for which coronary angiography was recommended. Her hospital course was also complicated by dialysis line infection, significant bleeding around the catheter site, bradycardia and subsequent atrial fibrillation with RVR. Given the catheter site bleeding, anticoagulation was temporarily held. She has reported allergy to amiodarone, was initially placed on diltiazem for atrial fibrillation despite reduced EF. She discussed GOC at outside hospital where it appears that she lacked capacity per note and decided to pursue higher level of care at [facility]. At that time, gen surgery was planning to place a left tunneled catheter due to concerns of the right side not being suitable    Upon  admit, patient appeared drowsy but conversant. Daughter reports that she typically gets lethargic 2/2 to the volume overload due to not receiving dialysis. Patient reported b/l left lower extremity pain. Daughter concerned about her not having dialysis in 4 days and wants a session today.     Interval History: Juhi sitting up in bed this morning, wearing supplemental O2, eating breakfast w family member at bedside. Having back pain overnight.     Review of patient's allergies indicates:   Allergen Reactions    Percocet [oxycodone-acetaminophen] Itching    Januvia [sitagliptin]     Jardiance [empagliflozin]      Leg cramps    Lipitor [atorvastatin] Other (See Comments)     Severe leg pain    Linaclotide Other (See Comments) and Nausea And Vomiting     Does not remember    Lubiprostone Other (See Comments) and Palpitations     Does not remember     Current Facility-Administered Medications   Medication Frequency    0.9%  NaCl infusion (for blood administration) Q24H PRN    acetaminophen tablet 650 mg Q6H PRN    amiodarone tablet 400 mg BID    Followed by    [START ON 7/22/2024] amiodarone tablet 200 mg Daily    calcium gluconate 1 g in NS IVPB (premixed) Once    dextromethorphan-guaiFENesin  mg/5 ml liquid 5 mL Q4H PRN    dextrose 10% bolus 125 mL 125 mL PRN    dextrose 10% bolus 250 mL 250 mL PRN    glucagon (human recombinant) injection 1 mg PRN    glucose chewable tablet 16 g PRN    glucose chewable tablet 24 g PRN    lactated ringers bolus 250 mL Once    LIDOcaine 5 % patch 2 patch Q24H    miconazole NITRATE 2 % top powder BID    midodrine tablet 10 mg Daily PRN    midodrine tablet 10 mg TID    naloxone 0.4 mg/mL injection 0.02 mg PRN    ondansetron injection 4 mg Q6H PRN    simethicone chewable tablet 80 mg TID PRN    sodium chloride 0.9% flush 10 mL PRN    sodium chloride 0.9% flush 10 mL PRN       Objective:     Vital Signs (Most Recent):  Temp: 97.8 °F (36.6 °C) (07/19/24 1140)  Pulse: 90 (07/19/24  1140)  Resp: (!) 26 (07/19/24 1140)  BP: (!) 89/50 (07/19/24 1140)  SpO2: 100 % (07/19/24 1140) Vital Signs (24h Range):  Temp:  [97.8 °F (36.6 °C)-98.8 °F (37.1 °C)] 97.8 °F (36.6 °C)  Pulse:  [] 90  Resp:  [18-26] 26  SpO2:  [93 %-100 %] 100 %  BP: ()/(50-77) 89/50     Weight: 128.5 kg (283 lb 4.7 oz) (07/17/24 1209)  Body mass index is 44.37 kg/m².  Body surface area is 2.46 meters squared.    I/O last 3 completed shifts:  In: 2764 [I.V.:2515.4; IV Piggyback:248.6]  Out: 1857 [Other:1857]     Physical Exam  Constitutional:       General: She is not in acute distress.     Appearance: Normal appearance. She is obese.   HENT:      Head: Normocephalic and atraumatic.      Mouth/Throat:      Mouth: Mucous membranes are moist.      Pharynx: Oropharynx is clear.   Cardiovascular:      Rate and Rhythm: Normal rate and regular rhythm.      Heart sounds: Normal heart sounds.   Pulmonary:      Effort: Pulmonary effort is normal.      Breath sounds: Normal breath sounds.      Comments: Wearing nasal cannula  Abdominal:      Palpations: Abdomen is soft.   Musculoskeletal:      Right lower leg: Edema (2+) present.      Left lower leg: Edema (2+) present.      Comments: Small amount of weeping in lower extremities.    Skin:     General: Skin is warm and dry.   Neurological:      General: No focal deficit present.      Mental Status: She is alert. She is disoriented.      Motor: Weakness present.          Significant Labs:  All labs within the past 24 hours have been reviewed.     Significant Imaging:  All imaging with the past 24 hours have been reviewed.  Assessment/Plan:     Renal/  * ESRD (end stage renal disease)  Nephrology consulted for inpatient-HD management  admitted as a transfer for acute HFrEF in setting of CKD5 and recent PEA arrest during dialysis. Recent PD catheter placement on 6/4 but concerned for a possibly leak from cath. Unable to initiate dialysis for 2 weeks PTA to OSH due to insurance  issues. Hospital course complicated by infected tunneled cath removed on 6/30. Transferred here for higher level of care. Initial plan at OSH was to replace tunneled catheter on the left side due to concerns to that right side may not by suitable. PD was initiated here in hospital however we were unable to remove significant amounts of fluid, At this time the daughter wishes to proceed with HD.     Plan/Recommendations  - Edema appears worse today despite hypotension, planned for SLED today to remove as much fluid as possible, per ICU the patient and her family has concerns about being in the ICU due to visitation hours and anxiety. D/t hypotension HD would be contraindicated, although ICU reports BP was improved. We will revisit her this afternoon to determine best course of action.   - Planning for replacement of tunnel cath w interventional nephrology, likely next week.  -Anemia this am was lab error, bedside US did not reveal any hematoma of catheter.   - Tunnel cath placed 7/11 - arterial port suctioning on vessel wall. Will need to be replaced.   -Acute resp failure - on supplemental O2  - Still having clotting issues with HD, Cath flow,TPA, heparin.   - Per interventional nephrology recommendation will need 5000IU/ml Heparin lock in each port with each dialysis session.   - use 1500 IU Heparin with the priming of the machine   - Renally adjust medications  - Pre and Post-HD weights   - Continue to monitor intake and output         Thank you for your consult. I will follow-up with patient. Please contact us if you have any additional questions.    Paulo Alicea MD  Nephrology  [facility] ([place])

## 2024-07-19 NOTE — ASSESSMENT & PLAN NOTE
Pt previously on PD. Unable to remove enough volume with PD catheter. Initially upgraded to MICU for emergent trialysis placement and CRRT. Now with tunneled dialysis catheter. Tolerated session of HD on 7/12 without complications.      - Nephrology following, appreciate recs  - Strict I/Os  - Renally dose meds  - Avoid nephrotoxic agents  - PD catheter removed 07/15  - Transfer to MICU for SLED  - Cont midodrine

## 2024-07-19 NOTE — PT/OT/SLP PROGRESS
Occupational Therapy   Treatment    Name: Juhi Taylor  MRN: [id number]  Admitting Diagnosis:  ESRD (end stage renal disease)  4 Days Post-Op    Recommendations:     Discharge Recommendations: Moderate Intensity Therapy  Discharge Equipment Recommendations:  bedside commode, wheelchair, walker, rolling, hospital bed  Barriers to discharge:  Other (Comment) (patient requires increased level of assistance)    Assessment:     Juhi Taylor is a 74 y.o. female with a medical diagnosis of ESRD (end stage renal disease).  She presents with the fallowing performance deficits affecting function are weakness, impaired endurance, impaired self care skills, impaired functional mobility, gait instability, impaired balance, pain, impaired cardiopulmonary response to activity, edema, decreased safety awareness, decreased lower extremity function, decreased upper extremity function. Patient agreeable to tx session, patient's BP was monitor during session, patient BP initially in supine was 95/52, sitting up EOB 89/52 118 HR, after few minutes re-checked BP it had a reading of 143,66 128 HR after returning to supine 138/77 120 HR oxygen level remained on high 90%. Patient did not report of having dizziness or SOB. Today session focused on tolerance to upright positioning for postural musculature to performed dynamic sitting to prepare for functional UB/LB dressing task sitting up at EOB. Patient improve sitting balance at the EOB  requiring CGA and B UE support.Patient is still well below baseline function and is not safe to return to home at this time. Patient continue to demonstrate that need for Moderate intensity therapy intervention on daily basis post acute setting.      Rehab Prognosis:  Good; patient would benefit from acute skilled OT services to address these deficits and reach maximum level of function.       Plan:     Patient to be seen 4 x/week to address the above listed problems via self-care/home  management, therapeutic activities, therapeutic exercises  Plan of Care Expires: 08/16/24  Plan of Care Reviewed with: patient, daughter    Subjective     Chief Complaint: weakness   Patient/Family Comments/goals: to return to PLOF  Pain/Comfort:  Pain Rating 1:  (pain only with activity)  Location - Orientation 1: generalized  Location 1: gluteal  Pain Addressed 1: Reposition, Distraction, Cessation of Activity  Pain Rating Post-Intervention 1:  (patient did not indicated further pain)    Objective:     Communicated with: Nurse prior to session.  Patient found HOB elevated with pulse ox (continuous), telemetry, PureWick, blood pressure cuff upon OT entry to room.  A client care conference was completed by the OTR and the PABON prior to treatment by the PABON to discuss the patient's POC and current status.   Co-treated with PT due to patient complexity deficits requiring two skilled therapist to appropriately and safely mobilized patient while facilitating functional task in addition to accommodating for patient's activity tolerance.    General Precautions: Standard, aspiration    Orthopedic Precautions:N/A  Braces: N/A  Respiratory Status: Nasal cannula, flow 2 L/min     Occupational Performance:     Bed Mobility:    Patient completed Rolling/Turning to Left with  maximal assistance of 2 persons   Patient completed Rolling/Turning to Right with maximal assistance of 2 persons   Patient completed Scooting/Bridging with maximal assistance of 2 persons   Patient completed Supine to Sit with maximal assistance of 2 persons   Patient completed Sit to Supine with maximal assistance of 2 persons   Patient was able to sit up at the EOB for 20 mins with CGA for sitting balance     Functional Mobility/Transfers:  Deferred further mobility task due patient request to return to supine position due to feeling weak.    Activities of Daily Living:  Grooming: maximal assistance to applied lotion on upper body   Lower Body Dressing:  total assistance to don/doff socks     [facility] 6 Click ADL: 10    Treatment & Education:  Discussed OT POC and progress  Educated patient on the importance to continue to perform exercises in order to reduce stiffness and promote joint mobility and blood flow  Addressed patient's questions and concerns within PABON scope of practice      Patient left HOB elevated with all lines intact, call button in reach, and daughter  present    GOALS:   Multidisciplinary Problems       Occupational Therapy Goals          Problem: Occupational Therapy    Goal Priority Disciplines Outcome Interventions   Occupational Therapy Goal     OT, PT/OT Progressing    Description: Goals to be met by: 08/04/24     Patient will increase functional independence with ADLs by performing:    UE Dressing with Modified [place].  LE Dressing with Minimal Assistance.  Grooming while standing at sink with Stand-by Assistance.  Toileting from bedside commode with Minimal Assistance for hygiene and clothing management.   Toilet transfer to bedside commode with Supervision.    DME:  Tub transfer bench is required for pt to return to t/s use with shower chair at present unsuitable with need for mobiltiy greater than pt can safely complete at present.     Patient has a mobility limitation that significantly impairs their ability to participate in one or more mobility related activities of daily living, including toileting. This deficit can be resolved by using a bedside commode. Patient demonstrates mobility limitations that will cause them to be confined to one room at home without bathroom access for up to 30 days. Using a bedside commode will greatly improve the patient's ability to participate in MRADLs.     Ambulation needs TBD on progress.                              Time Tracking:     OT Date of Treatment: 07/19/24  OT Start Time: 1104  OT Stop Time: 1136  OT Total Time (min): 32 min    Billable Minutes:Self Care/Home Management 20  Therapeutic  Activity 12    OT/DAYSI: DAYSI     Number of DAYSI visits since last OT visit: 1    7/19/2024

## 2024-07-19 NOTE — PT/OT/SLP PROGRESS
"Physical Therapy Co Treatment    Patient Name:  Juhi Taylor   MRN:  [id number]    Recommendations:     Discharge Recommendations: Moderate Intensity Therapy  Discharge Equipment Recommendations: bedside commode, wheelchair, walker, rolling, hospital bed  Barriers to discharge:  Increased assist needed    Assessment:     Juhi Taylor is a 74 y.o. female admitted with a medical diagnosis of ESRD (end stage renal disease).  She presents with the following impairments/functional limitations: weakness, impaired endurance, impaired self care skills, impaired functional mobility, gait instability, impaired balance, decreased lower extremity function, decreased upper extremity function, decreased safety awareness, pain, impaired cardiopulmonary response to activity, edema. Pt was agreeable to skilled therapy services and EOB activity.    Co-treatment performed due to patient's multiple deficits requiring two skilled therapists to appropriately and safely assess patient's strength and endurance while facilitating functional tasks in addition to accommodating for patient's activity tolerance.    Rehab Prognosis: Good; patient would benefit from acute skilled PT services to address these deficits and reach maximum level of function.    Recent Surgery: Procedure(s) (LRB):  REMOVAL, CATHETER, DIALYSIS, PERITONEAL (N/A)  BRONCHOSCOPY, FLEXIBLE (N/A) 4 Days Post-Op    Plan:     During this hospitalization, patient to be seen 4 x/week to address the identified rehab impairments via gait training, neuromuscular re-education, therapeutic activities, therapeutic exercises and progress toward the following goals:    Plan of Care Expires:  08/15/24    Subjective     Chief Complaint: "I have pain in my butt when I move that is 8/10 but laying still I have no pain."  Patient/Family Comments/goals: Return home  Pain/Comfort:  Pain Rating 1: 0/10 (unless moving)  Location - Side 1: Bilateral  Location - Orientation 1: " generalized  Location 1: gluteal      Objective:     Communicated with nursing (Mey) prior to session.  Patient found supine with telemetry, blood pressure cuff, pulse ox (continuous), PureWick upon PT entry to room.     General Precautions: Standard, aspiration  Orthopedic Precautions: N/A  Braces: N/A  Respiratory Status: Room air     Functional Mobility:  Bed Mobility:     Rolling Right: maximal assistance and of 2 persons  Scooting: maximal assistance and of 2 persons  Supine to Sit: maximal assistance and of 2 persons  Sit to Supine: maximal assistance and of 2 persons  Balance: EOB sitting:SBA  Therapeutic Exercise: Patient performed 1 set(s) of 10 repetitions of  the following seated exercises: ankle pumps, long arc quads, and marches for bilateral LE. Patient required skilled PT for instruction of exercises and appropriate cues to perform exercises safely and appropriately.       Position BP   Supine (start of session) 95/52   Seated EOB (Initial) 89/52   Seated EOB (mid session) 143/66   Seated EOB(prior to laying down) 138/77       AM-PAC 6 CLICK MOBILITY  Turning over in bed (including adjusting bedclothes, sheets and blankets)?: 2  Sitting down on and standing up from a chair with arms (e.g., wheelchair, bedside commode, etc.): 2  Moving from lying on back to sitting on the side of the bed?: 2  Moving to and from a bed to a chair (including a wheelchair)?: 1  Need to walk in hospital room?: 1  Climbing 3-5 steps with a railing?: 1  Basic Mobility Total Score: 9       Treatment & Education:  Patient provided with daily orientation and goals of this PT session.  Encouraged patient to perform daily exercises & mobility to increase endurance and decrease effects of bedrest. Time provided for therapeutic counseling and discussion of health disposition. All questions answered to patient's satisfaction, within scope of PT practice; voiced no other concerns. White board updated in patient's room, RN notified of  session.    Patient left HOB elevated with all lines intact, call button in reach, RN notified, and family present..    GOALS:   Multidisciplinary Problems       Physical Therapy Goals          Problem: Physical Therapy    Goal Priority Disciplines Outcome Goal Variances Interventions   Physical Therapy Goal     PT, PT/OT Progressing     Description: PT goals to be met by: 8/15/24    Patient will perform rolling each way with minimum assistance  Patient will perform supine <> sitting with minimum assistance  Patient will perform sit <> stand transitions with maximal assistance  Patient will perform transfers from bed <> chair or BSC with maximal assistance using LRAD  Patient will perform standing for 60 seconds with maximal assistance using RW or LRAD    DME Justifications (see above for complete DME recommendations)    Bedside Commode- Patient has a mobility limitation that significantly impairs their ability to participate in one or more mobility related activities of daily living, including toileting. This deficit can be resolved by using a bedside commode. Patient demonstrates mobility limitations that will cause them to be confined to one room at home without bathroom access for up to 30 days. Using a bedside commode will greatly improve the patient's ability to participate in MRADLs.     Rolling Walker- Patient demonstrates a mobility limitation that significantly impairs their ability to participate in one or more mobility related activities of daily living. Patient's mobility limitation cannot be sufficiently resolved with the use of a cane, but can be sufficiently resolved with the use of a rolling walker.The use of a rolling walker will considerably improve their ability to participate in MRADLs. Patient will use the walker on a regular basis at home.      Wheelchair-  Patient has a mobility limitation that significantly impairs their ability to participate in one or more mobility related activities of  daily living in customary locations in the home. The mobility limitation cannot be sufficiently resolved by the use of a cane or walker. The use of a manual wheelchair will greatly improve the patient's ability to participate in MRADLs. The patient will use the wheelchair on a regular basis at home. They have expressed their willingness to use a manual wheelchair in the home, and have a caregiver who is available and willing to assist with the wheelchair if needed.     Hospital Bed ·       Patient requires a hospital bed for positioning of the body in ways that are not feasible with an ordinary bed. The patient requires special positioning for pain relief, limited mobility, and/or being unable to independently make changes in body position without the use of a hospital bed. Pillows and wedges will not be adequate for resolving these positional issues.                         Time Tracking:     PT Received On: 07/19/24  PT Start Time: 1104     PT Stop Time: 1136  PT Total Time (min): 32 min     Billable Minutes: Therapeutic Activity 16 and Therapeutic Exercise 16    Treatment Type: Treatment  PT/PTA: PTA     Number of PTA visits since last PT visit: 1 07/19/2024

## 2024-07-19 NOTE — ASSESSMENT & PLAN NOTE
Nephrology consulted for inpatient-HD management  admitted as a transfer for acute HFrEF in setting of CKD5 and recent PEA arrest during dialysis. Recent PD catheter placement on 6/4 but concerned for a possibly leak from cath. Unable to initiate dialysis for 2 weeks PTA to OSH due to insurance issues. Hospital course complicated by infected tunneled cath removed on 6/30. Transferred here for higher level of care. Initial plan at OSH was to replace tunneled catheter on the left side due to concerns to that right side may not by suitable. PD was initiated here in hospital however we were unable to remove significant amounts of fluid, At this time the daughter wishes to proceed with HD.     Plan/Recommendations  - Edema appears worse today despite hypotension, planned for SLED today to remove as much fluid as possible, per ICU the patient and her family has concerns about being in the ICU due to visitation hours and anxiety. D/t hypotension HD would be contraindicated, although ICU reports BP was improved. We will revisit her this afternoon to determine best course of action.   - Planning for replacement of tunnel cath w interventional nephrology, likely next week.  -Anemia this am was lab error, bedside US did not reveal any hematoma of catheter.   - Tunnel cath placed 7/11 - arterial port suctioning on vessel wall. Will need to be replaced.   -Acute resp failure - on supplemental O2  - Still having clotting issues with HD, Cath flow,TPA, heparin.   - Per interventional nephrology recommendation will need 5000IU/ml Heparin lock in each port with each dialysis session.   - use 1500 IU Heparin with the priming of the machine   - Renally adjust medications  - Pre and Post-HD weights   - Continue to monitor intake and output

## 2024-07-19 NOTE — ASSESSMENT & PLAN NOTE
- Palliative care following, continued GOC with family.   - Patient has requested code status changed to DNR

## 2024-07-19 NOTE — ASSESSMENT & PLAN NOTE
AFib with RVR appears new following cardiac arrest at OSH. Per chart review, AFib was difficult to control. Patient endorses amiodarone reaction with itching, rash, and oral burning. Refused Sotalol, DCCV, and AICD placement at OSH. Was placed on Diltizem infusion and transitioned to PO metoprolol. Amio gtt restarted overnight 7/11 s/t uncontrolled afib rvr. Transitioned to PO amio.    - Daily CMP  - Keep K>4, Mag >2  -d/c eliquis in anticipation of new line  -metoprolol held due to hypotension  -cont amiodarone

## 2024-07-19 NOTE — PROGRESS NOTES
[facility] - Stepdown [facility] ([place] [address])  [facility]  Progress Note    Patient Name: Juhi Taylor  MRN: [id number]  Patient Class: IP- Inpatient   Admission Date: 7/3/2024  Length of Stay: 16 days  Attending Physician: Kristin Alvarado MD  Primary Care Provider: Tony Sadler MD        Subjective:     Principal Problem:ESRD (end stage renal disease)        HPI:  Juhi Taylor is a 74-year-old female with a past medical history of CKD stage 5 with recent PD catheter placement on 6/4, RCC s/p right nephrectomy, T2DM, obesity, CAD s/p PCI to Lcx and LAD in 11/2020 and HFmREF who is admitted as a transfer from [facility] for higher level of care and further cardiac evaluation. Nephrology consulted for hemodialysis. She initially presented on 6/18/24 to [facility] for hypervolemia in the setting of CKD 4-5 for which she had undergone elective PD catheter placement complicated by catheter site leaking. She had a complex hospital course, was initially placed on furosemide gtt without significant improvement and later underwent placement of tunneled line for HD with volume removal. During her second dialysis session she went into PEA arrest for which she was resuscitated, intubated and transferred to ICU. She was noted to have elevated troponin and subsequent TTE found reduced EF from 40-45% to 20-25% for which coronary angiography was recommended. Her hospital course was also complicated by dialysis line infection, significant bleeding around the catheter site, bradycardia and subsequent atrial fibrillation with RVR. Given the catheter site bleeding, anticoagulation was temporarily held. She has reported allergy to amiodarone, was initially placed on diltiazem for atrial fibrillation despite reduced EF. She discussed GOC at outside hospital where it appears that she lacked capacity per note and decided to pursue higher level of care at [facility]. At that time, gen surgery was planning to place a left tunneled  catheter due to concerns of the right side not being suitable     Upon admit, patient appeared drowsy but conversant. Daughter reports that she typically gets lethargic 2/2 to the volume overload due to not receiving dialysis. Patient reported b/l left lower extremity pain. Daughter concerned about her not having dialysis in 4 days and wants a session today.      Pt initially refusing dialysis. Seen by nephrology. VBG 7.220/33.4/34/13.7/-14. Critical care medicine consulted for emergent trialysis line placement and dialysis.     Overview/Hospital Course:  Ms. Taylor was admitted to MICU on 7/8 for urgent dialysis line placement and initiated of HD. Patient received short run of CRRT overnight without issue. Encephalopathy improving with continued CRRT treatments. Persistent AFib with RVR, received Digoxin loading dose (renally dosed), restarted PO Carvedilol. Patient remains in persistent AFib RVR despite therapeutic digoxin level and uptitration of Carvedilol levels, started on Amiodarone infusion overnight. Transitioned to amio PO, started on heparin gtt for afib AC. Tolerated HD well overnight without complications and less encephalopathic upon exam. Planning for gen surg to remove PD catheter. Upon induction pt vomited copious amounts of bile, was intubated. PD cathter removed successfully. Extubated to NC 7/16.      7/18:  Patient made DNR after Palliative Care discussions yesterday.  Tolerated SLED 12 hours overnight.      Interval History/Significant Events: NAEON. Transferred from MICU. Cont to require RRT, although hypotensive and unlikely to tolerate HD. Transfer back to MICU for SLED.     Review of Systems   All other systems reviewed and are negative.    Objective:     Vital Signs (Most Recent):  Temp: 97.8 °F (36.6 °C) (07/19/24 1140)  Pulse: 90 (07/19/24 1140)  Resp: (!) 26 (07/19/24 1140)  BP: 110/63 (07/19/24 1430)  SpO2: 100 % (07/19/24 1140) Vital Signs (24h Range):  Temp:  [97.8 °F (36.6  °C)-98.8 °F (37.1 °C)] 97.8 °F (36.6 °C)  Pulse:  [] 90  Resp:  [20-26] 26  SpO2:  [93 %-100 %] 100 %  BP: ()/(50-77) 110/63   Weight: 128.5 kg (283 lb 4.7 oz)  Body mass index is 44.37 kg/m².    No intake or output data in the 24 hours ending 07/19/24 1510         Physical Exam  Vitals and nursing note reviewed.   Constitutional:       General: She is not in acute distress.     Appearance: Normal appearance. She is obese. She is not ill-appearing, toxic-appearing or diaphoretic.   HENT:      Head: Normocephalic and atraumatic.      Right Ear: External ear normal.      Left Ear: External ear normal.      Nose: Nose normal.   Cardiovascular:      Rate and Rhythm: Tachycardia present. Rhythm irregular.      Pulses: Normal pulses.      Heart sounds: Normal heart sounds. No murmur heard.     No friction rub. No gallop.   Pulmonary:      Effort: Pulmonary effort is normal. No respiratory distress.      Breath sounds: Normal breath sounds. No wheezing, rhonchi or rales.   Abdominal:      General: Abdomen is flat. Bowel sounds are normal. There is no distension.      Palpations: Abdomen is soft.      Tenderness: There is no abdominal tenderness. There is no guarding or rebound.   Musculoskeletal:         General: No swelling or tenderness. Normal range of motion.   Skin:     General: Skin is warm and dry.      Coloration: Skin is not jaundiced or pale.   Neurological:      General: No focal deficit present.      Mental Status: She is alert and oriented to person, place, and time. Mental status is at baseline.   Psychiatric:         Mood and Affect: Mood normal.         Behavior: Behavior normal.         Thought Content: Thought content normal.         Judgment: Judgment normal.            Vents:  Vent Mode: Spont (07/16/24 1003)  Set Rate: 15 BPM (07/16/24 0903)  Vt Set: 420 mL (07/16/24 0903)  Pressure Support: 5 cmH20 (07/16/24 1003)  PEEP/CPAP: 5 cmH20 (07/16/24 1003)  Oxygen Concentration (%): 30 (07/16/24  1003)  Peak Airway Pressure: 10 cmH20 (07/16/24 1003)  Plateau Pressure: 0 cmH20 (07/16/24 1003)  Total Ve: 5.45 L/m (07/16/24 1003)  Negative Inspiratory Force (cm H2O): 0 (07/16/24 1003)  Lines/Drains/Airways       Central Venous Catheter Line  Duration                  Hemodialysis Catheter 07/11/24 1219 left subclavian 8 days              Drain  Duration             Female External Urinary Catheter w/ Suction 07/15/24 1404 4 days              Peripheral Intravenous Line  Duration                  Midline Catheter - Single Lumen 07/12/24 0430 Right basilic vein (medial side of arm) 7 days                  Significant Labs:    CBC/Anemia Profile:  Recent Labs   Lab 07/18/24 0321 07/19/24  0728 07/19/24  0947   WBC 20.51* 17.32* 21.02*   HGB 8.8* 5.7* 8.4*   HCT 26.3* 17.8* 27.1*    177 271   MCV 77* 80* 82   RDW 20.5* 21.2* 21.2*   IRON  --  20*  --    FERRITIN  --  169  --         Chemistries:  Recent Labs   Lab 07/17/24 2209 07/18/24 0321 07/19/24  0728   * 135*  135* 144   K 4.3 4.4  4.4 3.7    106  106 114*   CO2 22* 22*  22* 17*   BUN 29* 18  18 29*   CREATININE 3.6* 2.4*  2.4* 3.5*   CALCIUM 7.6* 7.4*  7.4* 6.4*   ALBUMIN 1.5* 1.5*  1.4* 1.2*   PROT  --  4.3* 3.6*   BILITOT  --  1.0 0.8   ALKPHOS  --  128 114   ALT  --  <5* <5*   AST  --  15 14   MG 2.0 1.8  --    PHOS 2.9 2.4*  2.3* 4.2       All pertinent labs within the past 24 hours have been reviewed.    Significant Imaging:  I have reviewed all pertinent imaging results/findings within the past 24 hours.    Assessment/Plan:      * ESRD (end stage renal disease)  Pt previously on PD. Unable to remove enough volume with PD catheter. Initially upgraded to MICU for emergent trialysis placement and CRRT. Now with tunneled dialysis catheter. Tolerated session of HD on 7/12 without complications.      - Nephrology following, appreciate recs  - Strict I/Os  - Renally dose meds  - Avoid nephrotoxic agents  - PD catheter removed  07/15  - Transfer to MICU for SLED  - Cont midodrine    Debility  PT/OT    Palliative care encounter  - Palliative care following, continued GOC with family.   - Patient has requested code status changed to DNR      ACP (advance care planning)        Atrial fibrillation with RVR  AFib with RVR appears new following cardiac arrest at OSH. Per chart review, AFib was difficult to control. Patient endorses amiodarone reaction with itching, rash, and oral burning. Refused Sotalol, DCCV, and AICD placement at OSH. Was placed on Diltizem infusion and transitioned to PO metoprolol. Amio gtt restarted overnight 7/11 s/t uncontrolled afib rvr. Transitioned to PO amio.    - Daily CMP  - Keep K>4, Mag >2  -d/c eliquis in anticipation of new line  -metoprolol held due to hypotension  -cont amiodarone      Acute on chronic combined systolic and diastolic heart failure  Echo    Left Ventricle: The left ventricle is moderately dilated. Normal wall thickness. Global hypokinesis present. There is severely reduced systolic function with a visually estimated ejection fraction of 20 - 25%.    Right Ventricle: Mild right ventricular enlargement. Wall thickness is normal. Right ventricle wall motion has global hypokinesis. Systolic function is moderately reduced.    Left Atrium: Left atrium is severely dilated.    Right Atrium: Right atrium is severely dilated.    Aortic Valve: The aortic valve is a trileaflet valve. There is mild aortic valve sclerosis.    Mitral Valve: There is mild regurgitation.    Tricuspid Valve: There is severe regurgitation.    Pulmonary Artery: There is moderate pulmonary hypertension. The estimated pulmonary artery systolic pressure is 61 mmHg.    IVC/SVC: Elevated venous pressure at 15 mmHg.    Pericardium: There is a trivial circumferential effusion. No indication of cardiac tamponade.     - Will require interventional cardiology for LHC vs PET for reduced EF once euvolemic  - LifeVest prior to discharge  -  Continue Entresto 24-26mg BID per cardiology recs, held for hypotension            Acute respiratory failure with hypoxia  Patient with Hypoxic Respiratory failure which is Acute.  she is not on home oxygen. Supplemental oxygen was provided and noted-       .   Signs/symptoms of respiratory failure include- respiratory distress. Contributing diagnoses includes - Aspiration Labs and images were reviewed. Patient Has recent ABG, which has been reviewed. Will treat underlying causes and adjust management of respiratory failure as follows-      -- Extubated this am 07/16  -- Pulmonary toilet    Severe obesity (BMI >= 40)  Body mass index is 44.37 kg/m². Morbid obesity complicates all aspects of disease management from diagnostic modalities to treatment. Weight loss encouraged and health benefits explained to patient.         Type 2 diabetes mellitus with microalbuminuria, without long-term current use of insulin  Type 2 diabetes mellitus with microalbuminuria, without long-term current use of insulin  Latest A1C 6.8; on Metformin at home     - POC glucose  - Hypoglycemia protocol      Essential hypertension  - Changed home Carvedilol to Metoprolol 25mg BID per cardiology recs  - Metoprolol held due to hypotension  - Midodrine started      VTE Risk Mitigation (From admission, onward)           Ordered     Place sequential compression device  Until discontinued         07/15/24 1330     IP VTE HIGH RISK PATIENT  Once         07/15/24 1330                    Discharge Planning   HARINI: 7/20/2024     Code Status: DNR   Is the patient medically ready for discharge?:     Reason for patient still in hospital (select all that apply): Treatment  Discharge Plan A: Home with family, Home Health   Discharge Delays: None known at this time              Bbuba Ochoa MD  Department of Hospital Medicine   [facility] - Step[facility] Flex ([place])

## 2024-07-19 NOTE — H&P
[facility] - Stepdown Flex ([place] [id number])  Critical Care Medicine  History & Physical    Patient Name: Juhi Taylor  MRN: [id number]  Admission Date: 7/3/2024  Hospital Length of Stay: 16 days  Code Status: DNR  Attending Physician: Kristin Alvarado MD   Primary Care Provider: Tony Sadler MD   Principal Problem: ESRD (end stage renal disease)    Subjective:     HPI:  Juhi Taylor is a 74-year-old female with a past medical history of CKD stage 5 with recent PD catheter placement on 6/4, RCC s/p right nephrectomy, T2DM, obesity, CAD s/p PCI to Lcx and LAD in 11/2020 and HFmREF who is admitted as a transfer from [facility] for higher level of care and further cardiac evaluation. Nephrology consulted for hemodialysis. She initially presented on 6/18/24 to [facility] for hypervolemia in the setting of CKD 4-5 for which she had undergone elective PD catheter placement complicated by catheter site leaking. She had a complex hospital course, was initially placed on furosemide gtt without significant improvement and later underwent placement of tunneled line for HD with volume removal. During her second dialysis session she went into PEA arrest for which she was resuscitated, intubated and transferred to ICU. She was noted to have elevated troponin and subsequent TTE found reduced EF from 40-45% to 20-25% for which coronary angiography was recommended. Her hospital course was also complicated by dialysis line infection, significant bleeding around the catheter site, bradycardia and subsequent atrial fibrillation with RVR. Given the catheter site bleeding, anticoagulation was temporarily held. She has reported allergy to amiodarone, was initially placed on diltiazem for atrial fibrillation despite reduced EF. She discussed GOC at outside hospital where it appears that she lacked capacity per note and decided to pursue higher level of care at [facility]. At that time, gen surgery was planning to place a left tunneled  catheter due to concerns of the right side not being suitable     Upon admit, patient appeared drowsy but conversant. Daughter reports that she typically gets lethargic 2/2 to the volume overload due to not receiving dialysis. Patient reported b/l left lower extremity pain. Daughter concerned about her not having dialysis in 4 days and wants a session today.     Pt initially refusing dialysis. Seen by nephrology. VBG 7.220/33.4/34/13.7/-14. Critical care medicine consulted for emergent trialysis line placement and dialysis.     Ms. Taylor was admitted to MICU on 7/8 for urgent dialysis line placement and initiated of HD. Patient received short run of CRRT overnight without issue. Encephalopathy improving with continued CRRT treatments. Persistent AFib with RVR, received Digoxin loading dose (renally dosed), restarted PO Carvedilol. Patient remains in persistent AFib RVR despite therapeutic digoxin level and uptitration of Carvedilol levels, started on Amiodarone infusion overnight. Transitioned to amio PO, started on heparin gtt for afib AC. Tolerated HD well overnight without complications and less encephalopathic upon exam. Planning for gen surg to remove PD catheter. Upon induction pt vomited copious amounts of bile, was intubated. PD cathter removed successfully. Extubated to NC 7/16.      She has had difficulties with her tunneled HD line with sluggish flow especially through the venous side.  She would have a 4-hour instillation of tPA in each line before being able to run iHD, and even then it would be stopped before completion.  Interventional Nephrology plans to replace the line at some point next week.  Of note, Palliative Care was also consulted and patient had made her wishes known that she is a DNR code status.  She was still vacillating between replacing her tunneled HD line given her adverse event during her last procedure or going home with hospice.  The family was upset with her decision and  April, her niece, asked that Palliative Care not return to see the patient.    On 7/19 Nephrology requested that the patient be stepped back up to the medical ICU for SLED.    Hospital/ICU Course:  Ms. Taylor was admitted to MICU on 7/8 for urgent dialysis line placement and initiated of HD. Patient received short run of CRRT overnight without issue. Encephalopathy improving with continued CRRT treatments. Persistent AFib with RVR, received Digoxin loading dose (renally dosed), restarted PO Carvedilol. Patient remains in persistent AFib RVR despite therapeutic digoxin level and uptitration of Carvedilol levels, started on Amiodarone infusion overnight. Transitioned to amio PO, started on heparin gtt for afib AC. Tolerated HD well overnight without complications and less encephalopathic upon exam. Planning for gen surg to remove PD catheter. Upon induction pt vomited copious amounts of bile, was intubated. PD cathter removed successfully. Extubated to NC 7/16.     7/18:  Patient made DNR after Palliative Care discussions yesterday.  Tolerated SLED 12 hours overnight.  Discussed difficulties regarding HD line with Interventional Nephrology and plans to replace it next week if patient it amenable.  Patient was stepped down from ICU.  7/19:  Stepped back up to medical ICU with plans for overnight SLED x 3 days.     Past Medical History:   Diagnosis Date    Anticoagulant long-term use     Arthritis     Breast cancer 2014    invasive lobular carcinoma    Cancer of kidney 11/2020    RIGHT KIDNEY CANCER    CHF (congestive heart failure)     Coronary artery disease dx 2005    Depression     Diabetes mellitus     Diastolic heart failure secondary to hypertension     Gout     Hyperlipemia     Hypertension     Hypertrophy of nasal turbinates     Kidney mass 2020    Right    Levoscoliosis     Lung nodule     left    Multiple thyroid nodules     NICOLE (obstructive sleep apnea)     uses C-PAP    Pulmonary hypertension     Severe  "sepsis 07/01/2024       Past Surgical History:   Procedure Laterality Date    AORTOGRAPHY N/A 12/04/2020    Procedure: Aortogram;  Surgeon: Paul Pedersen MD;  Location: [facility] CATH;  Service: Cardiology;  Laterality: N/A;    BREAST SURGERY      BRONCHOSCOPY N/A 7/15/2024    Procedure: BRONCHOSCOPY, FLEXIBLE;  Surgeon: Tanya Beckham MD;  [facility];  Service: General;  Laterality: N/A;    CARDIAC CATHETERIZATION  12/2020    CHOLECYSTECTOMY      COLONOSCOPY      multi -last 2014     CORONARY ARTERY BYPASS GRAFT      ESOPHAGOGASTRODUODENOSCOPY      2012     HAND SURGERY Right     INSERTION OF CATHETER N/A 6/23/2024    Procedure: Insertion,catheter;  Surgeon: Meli Griffin MD;  Location: [facility] OR;  Service: Vascular;  Laterality: N/A;  ORIGINAL CATHETER WAS REPOSITIONED.  NO NEW CATHETER WAS PLACED    INSERTION OF TUNNELED CENTRAL VENOUS HEMODIALYSIS CATHETER Right 7/11/2024    Procedure: Insertion, Catheter, Central Venous, Hemodialysis;  Surgeon: Hawa Landa MD;  Location: [facility] CATH LAB;  Service: Interventional Nephrology;  Laterality: Right;    INSERTION, CATHETER, DIALYSIS, PERITONEAL N/A 6/4/2024    Procedure: IN Insertion Catheter, Dialysis, Peritoneal - laparoscopic;  Surgeon: Rodriguez Catalan Jr., MD;  Location: [facility];  Service: General;  Laterality: N/A;    LAPAROSCOPIC ROBOT-ASSISTED SURGICAL REMOVAL OF KIDNEY USING DA CHELLE XI Right 03/10/2022    Procedure: XI ROBOTIC NEPHRECTOMY- radical;  Surgeon: Rolando Ramirez MD;  Location: [facility] OR;  Service: Urology;  Laterality: Right;    MASTECTOMY W/ SENTINEL NODE BIOPSY Bilateral 01/21/2015    bilateral "dog ears"    NASAL SINUS SURGERY  2015    Dr Bryant FESS/cauterization turbinate     PARTIAL HYSTERECTOMY  1989    PERCUTANEOUS TRANSLUMINAL BALLOON ANGIOPLASTY OF CORONARY ARTERY  12/04/2020    Procedure: Angioplasty-coronary;  Surgeon: Paul Pedersen MD;  Location: [facility] CATH;  Service: Cardiology;;    PERITONEAL CATHETER " REMOVAL N/A 7/15/2024    Procedure: REMOVAL, CATHETER, DIALYSIS, PERITONEAL;  Surgeon: Tanya Beckham MD;  [facility];  Service: General;  Laterality: N/A;    REMOVAL OF HEMODIALYSIS CATHETER  2024    Procedure: REMOVAL, CATHETER, HEMODIALYSIS;  Surgeon: Hawa Landa MD;  Location: [facility] CATH LAB;  Service: Interventional Nephrology;;    RENAL BIOPSY Right     2021 EJ    TUBAL LIGATION      ULTRASOUND GUIDANCE  2020    Procedure: Ultrasound Guidance;  Surgeon: Paul Pedersen MD;  Location: [facility] CATH;  Service: Cardiology;;       Review of patient's allergies indicates:   Allergen Reactions    Percocet [oxycodone-acetaminophen] Itching    Januvia [sitagliptin]     Jardiance [empagliflozin]      Leg cramps    Lipitor [atorvastatin] Other (See Comments)     Severe leg pain    Linaclotide Other (See Comments) and Nausea And Vomiting     Does not remember    Lubiprostone Other (See Comments) and Palpitations     Does not remember       Family History       Problem Relation (Age of Onset)    Asthma Daughter    Birth defects Daughter    Breast cancer Mother, Maternal Aunt    Depression Daughter    Drug abuse Daughter, Daughter    Glaucoma Sister    Hepatitis Brother    Hypertension Father    Learning disabilities Daughter    Mental illness Daughter    Stroke Father          Tobacco Use    Smoking status: Former     Current packs/day: 0.00     Types: Cigarettes     Start date: 2016     Quit date: 2016     Years since quittin.5    Smokeless tobacco: Never    Tobacco comments:     quit    Substance and Sexual Activity    Alcohol use: No    Drug use: No    Sexual activity: Not Currently     Partners: Male     Birth control/protection: None      Review of Systems   Respiratory:  Negative for shortness of breath.    Cardiovascular:  Negative for chest pain.   All other systems reviewed and are negative.    Objective:     Vital Signs (Most Recent):  Temp: 97.9 °F (36.6  °C) (07/19/24 1635)  Pulse: 102 (07/19/24 1635)  Resp: 20 (07/19/24 1635)  BP: (!) 111/56 (07/19/24 1635)  SpO2: 99 % (07/19/24 1635) Vital Signs (24h Range):  Temp:  [97.8 °F (36.6 °C)-98.5 °F (36.9 °C)] 97.9 °F (36.6 °C)  Pulse:  [] 102  Resp:  [20-26] 20  SpO2:  [97 %-100 %] 99 %  BP: ()/(50-77) 111/56   Weight: 128.5 kg (283 lb 4.7 oz)  Body mass index is 44.37 kg/m².    No intake or output data in the 24 hours ending 07/19/24 1742       Physical Exam  Vitals and nursing note reviewed.   Constitutional:       General: She is not in acute distress.     Appearance: She is ill-appearing. She is not toxic-appearing or diaphoretic.   HENT:      Head: Normocephalic and atraumatic.      Right Ear: External ear normal.      Left Ear: External ear normal.      Nose: Nose normal.   Cardiovascular:      Rate and Rhythm: Tachycardia present. Rhythm irregular.      Pulses: Normal pulses.      Heart sounds: Normal heart sounds. No murmur heard.     No friction rub. No gallop.      Comments: No JVD but with 1+ pitting edema bilaterally  Pulmonary:      Effort: Pulmonary effort is normal. No respiratory distress.      Breath sounds: Normal breath sounds. No wheezing, rhonchi or rales.   Abdominal:      General: Abdomen is flat. Bowel sounds are normal. There is no distension.      Palpations: Abdomen is soft.      Tenderness: There is no abdominal tenderness. There is no guarding or rebound.   Musculoskeletal:         General: Swelling present. No tenderness. Normal range of motion.   Skin:     General: Skin is warm and dry.      Coloration: Skin is not jaundiced or pale.   Neurological:      General: No focal deficit present.      Mental Status: She is alert and oriented to person, place, and time. Mental status is at baseline.   Psychiatric:         Mood and Affect: Mood normal.         Behavior: Behavior normal.         Thought Content: Thought content normal.         Judgment: Judgment normal.             Vents:  Vent Mode: Spont (07/16/24 1003)  Set Rate: 15 BPM (07/16/24 0903)  Vt Set: 420 mL (07/16/24 0903)  Pressure Support: 5 cmH20 (07/16/24 1003)  PEEP/CPAP: 5 cmH20 (07/16/24 1003)  Oxygen Concentration (%): 30 (07/16/24 1003)  Peak Airway Pressure: 10 cmH20 (07/16/24 1003)  Plateau Pressure: 0 cmH20 (07/16/24 1003)  Total Ve: 5.45 L/m (07/16/24 1003)  Negative Inspiratory Force (cm H2O): 0 (07/16/24 1003)  Lines/Drains/Airways       Central Venous Catheter Line  Duration                  Hemodialysis Catheter 07/11/24 1219 left subclavian 8 days              Drain  Duration             Female External Urinary Catheter w/ Suction 07/15/24 1404 4 days              Peripheral Intravenous Line  Duration                  Midline Catheter - Single Lumen 07/12/24 0430 Right basilic vein (medial side of arm) 7 days                  Significant Labs:    CBC/Anemia Profile:  Recent Labs   Lab 07/18/24 0321 07/19/24  0728 07/19/24  0947   WBC 20.51* 17.32* 21.02*   HGB 8.8* 5.7* 8.4*   HCT 26.3* 17.8* 27.1*    177 271   MCV 77* 80* 82   RDW 20.5* 21.2* 21.2*   IRON  --  20*  --    FERRITIN  --  169  --         Chemistries:  Recent Labs   Lab 07/17/24 2209 07/18/24 0321 07/19/24  0728   * 135*  135* 144   K 4.3 4.4  4.4 3.7    106  106 114*   CO2 22* 22*  22* 17*   BUN 29* 18  18 29*   CREATININE 3.6* 2.4*  2.4* 3.5*   CALCIUM 7.6* 7.4*  7.4* 6.4*   ALBUMIN 1.5* 1.5*  1.4* 1.2*   PROT  --  4.3* 3.6*   BILITOT  --  1.0 0.8   ALKPHOS  --  128 114   ALT  --  <5* <5*   AST  --  15 14   MG 2.0 1.8  --    PHOS 2.9 2.4*  2.3* 4.2       All pertinent labs within the past 24 hours have been reviewed.    Significant Imaging: I have reviewed all pertinent imaging results/findings within the past 24 hours.  Assessment/Plan:     Pulmonary  Acute respiratory failure with hypoxia  Patient with Hypoxic Respiratory failure which is Acute.  she is not on home oxygen. Supplemental oxygen was provided and  noted-      .   Signs/symptoms of respiratory failure include- respiratory distress. Contributing diagnoses includes - Aspiration Labs and images were reviewed. Patient Has recent ABG, which has been reviewed. Will treat underlying causes and adjust management of respiratory failure as follows-     -- Extubated this am 07/16  -- Pulmonary toilet    Cardiac/Vascular  Atrial fibrillation with RVR  AFib with RVR appears new following cardiac arrest at OSH. Per chart review, AFib was difficult to control. Patient endorses amiodarone reaction with itching, rash, and oral burning. Refused Sotalol, DCCV, and AICD placement at OSH. Was placed on Diltizem infusion and transitioned to PO metoprolol. Amio gtt restarted overnight 7/11 s/t uncontrolled afib rvr. Transitioned to PO amio.    - D/C heparin gtt  - Restart apixaban  - Daily CMP  - Keep K>4, Mag >2    Acute on chronic combined systolic and diastolic heart failure  Echo    Left Ventricle: The left ventricle is moderately dilated. Normal wall thickness. Global hypokinesis present. There is severely reduced systolic function with a visually estimated ejection fraction of 20 - 25%.    Right Ventricle: Mild right ventricular enlargement. Wall thickness is normal. Right ventricle wall motion has global hypokinesis. Systolic function is moderately reduced.    Left Atrium: Left atrium is severely dilated.    Right Atrium: Right atrium is severely dilated.    Aortic Valve: The aortic valve is a trileaflet valve. There is mild aortic valve sclerosis.    Mitral Valve: There is mild regurgitation.    Tricuspid Valve: There is severe regurgitation.    Pulmonary Artery: There is moderate pulmonary hypertension. The estimated pulmonary artery systolic pressure is 61 mmHg.    IVC/SVC: Elevated venous pressure at 15 mmHg.    Pericardium: There is a trivial circumferential effusion. No indication of cardiac tamponade.    - Will require interventional cardiology for LHC vs PET for  reduced EF once euvolemic  - LifeVest prior to discharge  - Continue Entresto 24-26mg BID per cardiology recs    Essential hypertension  - Changed home Carvedilol to Metoprolol 25mg BID per cardiology recs    Renal/  * ESRD (end stage renal disease)  Pt previously on PD. Unable to remove enough volume with PD catheter. Initially upgraded to MICU for emergent trialysis placement and CRRT. Now with tunneled dialysis catheter. Tolerated session of HD on 7/12 without complications.     - Nephrology following, appreciate recs  - Strict I/Os  - Renally dose meds  - Avoid nephrotoxic agents  - PD catheter removed 07/15  - Tolerated HD session overnight 07/15    Endocrine  Type 2 diabetes mellitus with microalbuminuria, without long-term current use of insulin  Latest A1C 6.8; on Metformin at home    - POC glucose  - Hypoglycemia protocol    Palliative Care  Palliative care encounter  - Palliative care following, continued GOC with family. Plan for family meeting today 07/17    ACP (advance care planning)  - Palliative care consulted, appreciate their assistance in clarification of GOC/POC with patient and family             Critical Care Time: 35 minutes  Critical secondary to Patient has a condition that poses threat to life and bodily function: Acute Renal Failure    Critical care was time spent personally by me on the following activities: development of treatment plan with patient or surrogate and bedside caregivers, discussions with consultants, evaluation of patient's response to treatment, examination of patient, ordering and performing treatments and interventions, ordering and review of laboratory studies, ordering and review of radiographic studies, pulse oximetry, re-evaluation of patient's condition. This critical care time did not overlap with that of any other provider or involve time for any procedures.         Kristin Alvarado M.D.  Rapid Response/Critical Care  Department of Pulmonary Medicine  [facility]        N.B.: Portions of this note was dictated using M*Modal Fluency Direct--there may be voice recognition errors occasionally missed on review.

## 2024-07-19 NOTE — ASSESSMENT & PLAN NOTE
- Changed home Carvedilol to Metoprolol 25mg BID per cardiology recs  - Metoprolol held due to hypotension  - Midodrine started

## 2024-07-19 NOTE — NURSING
Patient transferred from ICU. A & O x4. Vss. Room air. 4 eyes complete. Purewick placed. Grand daughter at bedside. Voices no other concerns at this time. Bed locked and low position. Call light within reach.

## 2024-07-19 NOTE — ASSESSMENT & PLAN NOTE
Type 2 diabetes mellitus with microalbuminuria, without long-term current use of insulin  Latest A1C 6.8; on Metformin at home     - POC glucose  - Hypoglycemia protocol

## 2024-07-19 NOTE — SUBJECTIVE & OBJECTIVE
Interval History/Significant Events: NAEON. Transferred from MICU. Cont to require RRT, although hypotensive and unlikely to tolerate HD. Transfer back to MICU for SLED.     Review of Systems   All other systems reviewed and are negative.    Objective:     Vital Signs (Most Recent):  Temp: 97.8 °F (36.6 °C) (07/19/24 1140)  Pulse: 90 (07/19/24 1140)  Resp: (!) 26 (07/19/24 1140)  BP: 110/63 (07/19/24 1430)  SpO2: 100 % (07/19/24 1140) Vital Signs (24h Range):  Temp:  [97.8 °F (36.6 °C)-98.8 °F (37.1 °C)] 97.8 °F (36.6 °C)  Pulse:  [] 90  Resp:  [20-26] 26  SpO2:  [93 %-100 %] 100 %  BP: ()/(50-77) 110/63   Weight: 128.5 kg (283 lb 4.7 oz)  Body mass index is 44.37 kg/m².    No intake or output data in the 24 hours ending 07/19/24 1510         Physical Exam  Vitals and nursing note reviewed.   Constitutional:       General: She is not in acute distress.     Appearance: Normal appearance. She is obese. She is not ill-appearing, toxic-appearing or diaphoretic.   HENT:      Head: Normocephalic and atraumatic.      Right Ear: External ear normal.      Left Ear: External ear normal.      Nose: Nose normal.   Cardiovascular:      Rate and Rhythm: Tachycardia present. Rhythm irregular.      Pulses: Normal pulses.      Heart sounds: Normal heart sounds. No murmur heard.     No friction rub. No gallop.   Pulmonary:      Effort: Pulmonary effort is normal. No respiratory distress.      Breath sounds: Normal breath sounds. No wheezing, rhonchi or rales.   Abdominal:      General: Abdomen is flat. Bowel sounds are normal. There is no distension.      Palpations: Abdomen is soft.      Tenderness: There is no abdominal tenderness. There is no guarding or rebound.   Musculoskeletal:         General: No swelling or tenderness. Normal range of motion.   Skin:     General: Skin is warm and dry.      Coloration: Skin is not jaundiced or pale.   Neurological:      General: No focal deficit present.      Mental Status: She is  alert and oriented to person, place, and time. Mental status is at baseline.   Psychiatric:         Mood and Affect: Mood normal.         Behavior: Behavior normal.         Thought Content: Thought content normal.         Judgment: Judgment normal.            Vents:  Vent Mode: Spont (07/16/24 1003)  Set Rate: 15 BPM (07/16/24 0903)  Vt Set: 420 mL (07/16/24 0903)  Pressure Support: 5 cmH20 (07/16/24 1003)  PEEP/CPAP: 5 cmH20 (07/16/24 1003)  Oxygen Concentration (%): 30 (07/16/24 1003)  Peak Airway Pressure: 10 cmH20 (07/16/24 1003)  Plateau Pressure: 0 cmH20 (07/16/24 1003)  Total Ve: 5.45 L/m (07/16/24 1003)  Negative Inspiratory Force (cm H2O): 0 (07/16/24 1003)  Lines/Drains/Airways       Central Venous Catheter Line  Duration                  Hemodialysis Catheter 07/11/24 1219 left subclavian 8 days              Drain  Duration             Female External Urinary Catheter w/ Suction 07/15/24 1404 4 days              Peripheral Intravenous Line  Duration                  Midline Catheter - Single Lumen 07/12/24 0430 Right basilic vein (medial side of arm) 7 days                  Significant Labs:    CBC/Anemia Profile:  Recent Labs   Lab 07/18/24 0321 07/19/24  0728 07/19/24  0947   WBC 20.51* 17.32* 21.02*   HGB 8.8* 5.7* 8.4*   HCT 26.3* 17.8* 27.1*    177 271   MCV 77* 80* 82   RDW 20.5* 21.2* 21.2*   IRON  --  20*  --    FERRITIN  --  169  --         Chemistries:  Recent Labs   Lab 07/17/24 2209 07/18/24  0321 07/19/24  0728   * 135*  135* 144   K 4.3 4.4  4.4 3.7    106  106 114*   CO2 22* 22*  22* 17*   BUN 29* 18  18 29*   CREATININE 3.6* 2.4*  2.4* 3.5*   CALCIUM 7.6* 7.4*  7.4* 6.4*   ALBUMIN 1.5* 1.5*  1.4* 1.2*   PROT  --  4.3* 3.6*   BILITOT  --  1.0 0.8   ALKPHOS  --  128 114   ALT  --  <5* <5*   AST  --  15 14   MG 2.0 1.8  --    PHOS 2.9 2.4*  2.3* 4.2       All pertinent labs within the past 24 hours have been reviewed.    Significant Imaging:  I have reviewed all  pertinent imaging results/findings within the past 24 hours.

## 2024-07-19 NOTE — ASSESSMENT & PLAN NOTE
Echo    Left Ventricle: The left ventricle is moderately dilated. Normal wall thickness. Global hypokinesis present. There is severely reduced systolic function with a visually estimated ejection fraction of 20 - 25%.    Right Ventricle: Mild right ventricular enlargement. Wall thickness is normal. Right ventricle wall motion has global hypokinesis. Systolic function is moderately reduced.    Left Atrium: Left atrium is severely dilated.    Right Atrium: Right atrium is severely dilated.    Aortic Valve: The aortic valve is a trileaflet valve. There is mild aortic valve sclerosis.    Mitral Valve: There is mild regurgitation.    Tricuspid Valve: There is severe regurgitation.    Pulmonary Artery: There is moderate pulmonary hypertension. The estimated pulmonary artery systolic pressure is 61 mmHg.    IVC/SVC: Elevated venous pressure at 15 mmHg.    Pericardium: There is a trivial circumferential effusion. No indication of cardiac tamponade.     - Will require interventional cardiology for LHC vs PET for reduced EF once euvolemic  - LifeVest prior to discharge  - Continue Entresto 24-26mg BID per cardiology recs, held for hypotension

## 2024-07-19 NOTE — SUBJECTIVE & OBJECTIVE
Past Medical History:   Diagnosis Date    Anticoagulant long-term use     Arthritis     Breast cancer 2014    invasive lobular carcinoma    Cancer of kidney 11/2020    RIGHT KIDNEY CANCER    CHF (congestive heart failure)     Coronary artery disease dx 2005    Depression     Diabetes mellitus     Diastolic heart failure secondary to hypertension     Gout     Hyperlipemia     Hypertension     Hypertrophy of nasal turbinates     Kidney mass 2020    Right    Levoscoliosis     Lung nodule     left    Multiple thyroid nodules     NICOLE (obstructive sleep apnea)     uses C-PAP    Pulmonary hypertension     Severe sepsis 07/01/2024       Past Surgical History:   Procedure Laterality Date    AORTOGRAPHY N/A 12/04/2020    Procedure: Aortogram;  Surgeon: Paul Pedersen MD;  Location: [facility] CATH;  Service: Cardiology;  Laterality: N/A;    BREAST SURGERY      BRONCHOSCOPY N/A 7/15/2024    Procedure: BRONCHOSCOPY, FLEXIBLE;  Surgeon: Tanya Beckham MD;  Location: [address];  Service: General;  Laterality: N/A;    CARDIAC CATHETERIZATION  12/2020    CHOLECYSTECTOMY      COLONOSCOPY      multi -last 2014     CORONARY ARTERY BYPASS GRAFT      ESOPHAGOGASTRODUODENOSCOPY      2012     HAND SURGERY Right     INSERTION OF CATHETER N/A 6/23/2024    Procedure: Insertion,catheter;  Surgeon: Meli Griffin MD;  Location: [facility] OR;  Service: Vascular;  Laterality: N/A;  ORIGINAL CATHETER WAS REPOSITIONED.  NO NEW CATHETER WAS PLACED    INSERTION OF TUNNELED CENTRAL VENOUS HEMODIALYSIS CATHETER Right 7/11/2024    Procedure: Insertion, Catheter, Central Venous, Hemodialysis;  Surgeon: Hawa Landa MD;  Location: [facility] CATH LAB;  Service: Interventional Nephrology;  Laterality: Right;    INSERTION, CATHETER, DIALYSIS, PERITONEAL N/A 6/4/2024    Procedure: IN Insertion Catheter, Dialysis, Peritoneal - laparoscopic;  Surgeon: Rodriguez Catalan Jr., MD;  Location: [facility];  Service: General;  Laterality: N/A;    LAPAROSCOPIC  "ROBOT-ASSISTED SURGICAL REMOVAL OF KIDNEY USING DA CHELLE XI Right 03/10/2022    Procedure: XI ROBOTIC NEPHRECTOMY- radical;  Surgeon: Rolando Ramirez MD;  Location: [facility] OR;  Service: Urology;  Laterality: Right;    MASTECTOMY W/ SENTINEL NODE BIOPSY Bilateral 01/21/2015    bilateral "dog ears"    NASAL SINUS SURGERY  2015    Dr Bryant FESS/cauterization turbinate     PARTIAL HYSTERECTOMY  1989    PERCUTANEOUS TRANSLUMINAL BALLOON ANGIOPLASTY OF CORONARY ARTERY  12/04/2020    Procedure: Angioplasty-coronary;  Surgeon: Paul Pedersen MD;  Location: [facility] CATH;  Service: Cardiology;;    PERITONEAL CATHETER REMOVAL N/A 7/15/2024    Procedure: REMOVAL, CATHETER, DIALYSIS, PERITONEAL;  Surgeon: Tanya Beckham MD;  Location: [facility] OR [address] [place] [address];  Service: General;  Laterality: N/A;    REMOVAL OF HEMODIALYSIS CATHETER  7/11/2024    Procedure: REMOVAL, CATHETER, HEMODIALYSIS;  Surgeon: Hawa Landa MD;  Location: [facility] CATH LAB;  Service: Interventional Nephrology;;    RENAL BIOPSY Right     9/20/2021 EJ    TUBAL LIGATION      ULTRASOUND GUIDANCE  12/04/2020    Procedure: Ultrasound Guidance;  Surgeon: Paul Pedersen MD;  Location: [facility] CATH;  Service: Cardiology;;       Review of patient's allergies indicates:   Allergen Reactions    Percocet [oxycodone-acetaminophen] Itching    Januvia [sitagliptin]     Jardiance [empagliflozin]      Leg cramps    Lipitor [atorvastatin] Other (See Comments)     Severe leg pain    Linaclotide Other (See Comments) and Nausea And Vomiting     Does not remember    Lubiprostone Other (See Comments) and Palpitations     Does not remember       Family History       Problem Relation (Age of Onset)    Asthma Daughter    Birth defects Daughter    Breast cancer Mother, Maternal Aunt    Depression Daughter    Drug abuse Daughter, Daughter    Glaucoma Sister    Hepatitis Brother    Hypertension Father    Learning disabilities Daughter    Mental illness Daughter    Stroke Father      "     Tobacco Use    Smoking status: Former     Current packs/day: 0.00     Types: Cigarettes     Start date: 2016     Quit date: 2016     Years since quittin.5    Smokeless tobacco: Never    Tobacco comments:     quit    Substance and Sexual Activity    Alcohol use: No    Drug use: No    Sexual activity: Not Currently     Partners: Male     Birth control/protection: None      Review of Systems   Respiratory:  Negative for shortness of breath.    Cardiovascular:  Negative for chest pain.   All other systems reviewed and are negative.    Objective:     Vital Signs (Most Recent):  Temp: 97.9 °F (36.6 °C) (24 1635)  Pulse: 102 (24 1635)  Resp: 20 (24 1635)  BP: (!) 111/56 (24 1635)  SpO2: 99 % (24 1635) Vital Signs (24h Range):  Temp:  [97.8 °F (36.6 °C)-98.5 °F (36.9 °C)] 97.9 °F (36.6 °C)  Pulse:  [] 102  Resp:  [20-26] 20  SpO2:  [97 %-100 %] 99 %  BP: ()/(50-77) 111/56   Weight: 128.5 kg (283 lb 4.7 oz)  Body mass index is 44.37 kg/m².    No intake or output data in the 24 hours ending 24 1742       Physical Exam  Vitals and nursing note reviewed.   Constitutional:       General: She is not in acute distress.     Appearance: She is ill-appearing. She is not toxic-appearing or diaphoretic.   HENT:      Head: Normocephalic and atraumatic.      Right Ear: External ear normal.      Left Ear: External ear normal.      Nose: Nose normal.   Cardiovascular:      Rate and Rhythm: Tachycardia present. Rhythm irregular.      Pulses: Normal pulses.      Heart sounds: Normal heart sounds. No murmur heard.     No friction rub. No gallop.      Comments: No JVD but with 1+ pitting edema bilaterally  Pulmonary:      Effort: Pulmonary effort is normal. No respiratory distress.      Breath sounds: Normal breath sounds. No wheezing, rhonchi or rales.   Abdominal:      General: Abdomen is flat. Bowel sounds are normal. There is no distension.      Palpations: Abdomen  is soft.      Tenderness: There is no abdominal tenderness. There is no guarding or rebound.   Musculoskeletal:         General: Swelling present. No tenderness. Normal range of motion.   Skin:     General: Skin is warm and dry.      Coloration: Skin is not jaundiced or pale.   Neurological:      General: No focal deficit present.      Mental Status: She is alert and oriented to person, place, and time. Mental status is at baseline.   Psychiatric:         Mood and Affect: Mood normal.         Behavior: Behavior normal.         Thought Content: Thought content normal.         Judgment: Judgment normal.            Vents:  Vent Mode: Spont (07/16/24 1003)  Set Rate: 15 BPM (07/16/24 0903)  Vt Set: 420 mL (07/16/24 0903)  Pressure Support: 5 cmH20 (07/16/24 1003)  PEEP/CPAP: 5 cmH20 (07/16/24 1003)  Oxygen Concentration (%): 30 (07/16/24 1003)  Peak Airway Pressure: 10 cmH20 (07/16/24 1003)  Plateau Pressure: 0 cmH20 (07/16/24 1003)  Total Ve: 5.45 L/m (07/16/24 1003)  Negative Inspiratory Force (cm H2O): 0 (07/16/24 1003)  Lines/Drains/Airways       Central Venous Catheter Line  Duration                  Hemodialysis Catheter 07/11/24 1219 left subclavian 8 days              Drain  Duration             Female External Urinary Catheter w/ Suction 07/15/24 1404 4 days              Peripheral Intravenous Line  Duration                  Midline Catheter - Single Lumen 07/12/24 0430 Right basilic vein (medial side of arm) 7 days                  Significant Labs:    CBC/Anemia Profile:  Recent Labs   Lab 07/18/24 0321 07/19/24  0728 07/19/24  0947   WBC 20.51* 17.32* 21.02*   HGB 8.8* 5.7* 8.4*   HCT 26.3* 17.8* 27.1*    177 271   MCV 77* 80* 82   RDW 20.5* 21.2* 21.2*   IRON  --  20*  --    FERRITIN  --  169  --         Chemistries:  Recent Labs   Lab 07/17/24  2209 07/18/24  0321 07/19/24  0728   * 135*  135* 144   K 4.3 4.4  4.4 3.7    106  106 114*   CO2 22* 22*  22* 17*   BUN 29* 18  18 29*    CREATININE 3.6* 2.4*  2.4* 3.5*   CALCIUM 7.6* 7.4*  7.4* 6.4*   ALBUMIN 1.5* 1.5*  1.4* 1.2*   PROT  --  4.3* 3.6*   BILITOT  --  1.0 0.8   ALKPHOS  --  128 114   ALT  --  <5* <5*   AST  --  15 14   MG 2.0 1.8  --    PHOS 2.9 2.4*  2.3* 4.2       All pertinent labs within the past 24 hours have been reviewed.    Significant Imaging: I have reviewed all pertinent imaging results/findings within the past 24 hours.

## 2024-07-19 NOTE — ASSESSMENT & PLAN NOTE
Body mass index is 44.37 kg/m². Morbid obesity complicates all aspects of disease management from diagnostic modalities to treatment. Weight loss encouraged and health benefits explained to patient.

## 2024-07-19 NOTE — PLAN OF CARE
Problem: Adult Inpatient Plan of Care  Goal: Plan of Care Review  Outcome: Progressing  Goal: Patient-Specific Goal (Individualized)  Outcome: Progressing  Goal: Absence of Hospital-Acquired Illness or Injury  Outcome: Progressing  Goal: Optimal Comfort and Wellbeing  Outcome: Progressing  Goal: Readiness for Transition of Care  Outcome: Progressing     Problem: Diabetes Comorbidity  Goal: Blood Glucose Level Within Targeted Range  Outcome: Progressing  Intervention: Monitor and Manage Glycemia  Flowsheets (Taken 7/19/2024 0421)  Glycemic Management: blood glucose monitored     Problem: Sepsis/Septic Shock  Goal: Optimal Coping  Outcome: Progressing  Goal: Absence of Bleeding  Outcome: Progressing  Goal: Blood Glucose Level Within Targeted Range  Outcome: Progressing  Goal: Absence of Infection Signs and Symptoms  Outcome: Progressing  Goal: Optimal Nutrition Intake  Outcome: Progressing     Problem: Bariatric Environmental Safety  Goal: Safety Maintained with Care  Outcome: Progressing  Intervention: Promote Safety and Comfort  Flowsheets (Taken 7/19/2024 0421)  Bariatric Safety: specialty bed utilized     Problem: Wound  Goal: Optimal Coping  Outcome: Progressing  Goal: Optimal Functional Ability  Outcome: Progressing  Goal: Absence of Infection Signs and Symptoms  Outcome: Progressing  Goal: Improved Oral Intake  Outcome: Progressing  Goal: Optimal Pain Control and Function  Outcome: Progressing  Goal: Skin Health and Integrity  Outcome: Progressing  Goal: Optimal Wound Healing  Outcome: Progressing

## 2024-07-20 LAB
OHS QRS DURATION: 140 MS
OHS QRS DURATION: 142 MS
OHS QTC CALCULATION: 467 MS
OHS QTC CALCULATION: 514 MS

## 2024-07-20 NOTE — PROGRESS NOTES
[facility]  Critical Care Medicine  Progress Note    Patient Name: Juhi Taylor  MRN: [id number]  Admission Date: 7/3/2024  Hospital Length of Stay: 17 days  Code Status: DNR  Attending Provider: Rochelle Cuellar MD  Primary Care Provider: Tony Sadler MD   Principal Problem: ESRD (end stage renal disease)    Subjective:     HPI:  Juhi Taylor is a 74-year-old female with a past medical history of CKD stage 5 with recent PD catheter placement on 6/4, RCC s/p right nephrectomy, T2DM, obesity, CAD s/p PCI to Lcx and LAD in 11/2020 and HFmREF who is admitted as a transfer from [facility] for higher level of care and further cardiac evaluation. Nephrology consulted for hemodialysis. She initially presented on 6/18/24 to [facility] for hypervolemia in the setting of CKD 4-5 for which she had undergone elective PD catheter placement complicated by catheter site leaking. She had a complex hospital course, was initially placed on furosemide gtt without significant improvement and later underwent placement of tunneled line for HD with volume removal. During her second dialysis session she went into PEA arrest for which she was resuscitated, intubated and transferred to ICU. She was noted to have elevated troponin and subsequent TTE found reduced EF from 40-45% to 20-25% for which coronary angiography was recommended. Her hospital course was also complicated by dialysis line infection, significant bleeding around the catheter site, bradycardia and subsequent atrial fibrillation with RVR. Given the catheter site bleeding, anticoagulation was temporarily held. She has reported allergy to amiodarone, was initially placed on diltiazem for atrial fibrillation despite reduced EF. She discussed GOC at outside hospital where it appears that she lacked capacity per note and decided to pursue higher level of care [place] [facility]. At that time, gen surgery was planning to place a left tunneled catheter due to  concerns of the right side not being suitable     Upon admit, patient appeared drowsy but conversant. Daughter reports that she typically gets lethargic 2/2 to the volume overload due to not receiving dialysis. Patient reported b/l left lower extremity pain. Daughter concerned about her not having dialysis in 4 days and wants a session today.     Pt initially refusing dialysis. Seen by nephrology. VBG 7.220/33.4/34/13.7/-14. Critical care medicine consulted for emergent trialysis line placement and dialysis.     Ms. Taylor was admitted to MICU on 7/8 for urgent dialysis line placement and initiated of HD. Patient received short run of CRRT overnight without issue. Encephalopathy improving with continued CRRT treatments. Persistent AFib with RVR, received Digoxin loading dose (renally dosed), restarted PO Carvedilol. Patient remains in persistent AFib RVR despite therapeutic digoxin level and uptitration of Carvedilol levels, started on Amiodarone infusion overnight. Transitioned to amio PO, started on heparin gtt for afib AC. Tolerated HD well overnight without complications and less encephalopathic upon exam. Planning for gen surg to remove PD catheter. Upon induction pt vomited copious amounts of bile, was intubated. PD cathter removed successfully. Extubated to NC 7/16.      She has had difficulties with her tunneled HD line with sluggish flow especially through the venous side.  She would have a 4-hour instillation of tPA in each line before being able to run iHD, and even then it would be stopped before completion.  Interventional Nephrology plans to replace the line at some point next week.  Of note, Palliative Care was also consulted and patient had made her wishes known that she is a DNR code status.  She was still vacillating between replacing her tunneled HD line given her adverse event during her last procedure or going home with hospice.  The family was upset with her decision and April, her niece,  asked that Palliative Care not return to see the patient.    On 7/19 Nephrology requested that the patient be stepped back up to the medical ICU for SLED.    Hospital/ICU Course:  Ms. Taylor was admitted to MICU on 7/8 for urgent dialysis line placement and initiated of HD. Patient received short run of CRRT overnight without issue. Encephalopathy improving with continued CRRT treatments. Persistent AFib with RVR, received Digoxin loading dose (renally dosed), restarted PO Carvedilol. Patient remains in persistent AFib RVR despite therapeutic digoxin level and uptitration of Carvedilol levels, started on Amiodarone infusion overnight. Transitioned to amio PO, started on heparin gtt for afib AC. Tolerated HD well overnight without complications and less encephalopathic upon exam. Planning for gen surg to remove PD catheter. Upon induction pt vomited copious amounts of bile, was intubated. PD cathter removed successfully. Extubated to NC 7/16.     7/18:  Patient made DNR after Palliative Care discussions yesterday.  Tolerated SLED 12 hours overnight.  Discussed difficulties regarding HD line with Interventional Nephrology and plans to replace it next week if patient it amenable.  Patient was stepped down from ICU.  7/19:  Stepped back up to medical ICU with plans for overnight SLED x 3 days.      Review of Systems   Respiratory:  Negative for shortness of breath.    Cardiovascular:  Negative for chest pain.   Musculoskeletal:  Positive for myalgias (Right arm pain).   All other systems reviewed and are negative.    Objective:     Vital Signs (Most Recent):  Temp: 97.8 °F (36.6 °C) (07/20/24 1500)  Pulse: 88 (07/20/24 1615)  Resp: (!) 25 (07/20/24 1615)  BP: (!) 81/62 (07/20/24 1615)  SpO2: (!) 93 % (07/20/24 1615) Vital Signs (24h Range):  Temp:  [97.5 °F (36.4 °C)-98.2 °F (36.8 °C)] 97.8 °F (36.6 °C)  Pulse:  [] 88  Resp:  [13-29] 25  SpO2:  [91 %-100 %] 93 %  BP: ()/() 81/62   Weight: 128.5 kg  (283 lb 4.7 oz)  Body mass index is 44.37 kg/m².      Intake/Output Summary (Last 24 hours) at 7/20/2024 1635  Last data filed at 7/20/2024 1600  Gross per 24 hour   Intake 1816.36 ml   Output 1878 ml   Net -61.64 ml          Physical Exam  Vitals and nursing note reviewed.   Constitutional:       General: She is not in acute distress.     Appearance: She is ill-appearing. She is not toxic-appearing or diaphoretic.   HENT:      Head: Normocephalic and atraumatic.      Right Ear: External ear normal.      Left Ear: External ear normal.      Nose: Nose normal.   Cardiovascular:      Rate and Rhythm: Tachycardia present. Rhythm irregular.      Pulses: Normal pulses.      Heart sounds: Normal heart sounds. No murmur heard.     No friction rub. No gallop.      Comments: No JVD but with 1+ pitting edema bilaterally  Pulmonary:      Effort: Pulmonary effort is normal. No respiratory distress.      Breath sounds: Normal breath sounds. No wheezing, rhonchi or rales.   Abdominal:      General: Abdomen is flat. Bowel sounds are normal. There is no distension.      Palpations: Abdomen is soft.      Tenderness: There is no abdominal tenderness. There is no guarding or rebound.   Musculoskeletal:         General: Swelling present. No tenderness. Normal range of motion.      Comments: Tender area just above the right antecubital area.   Skin:     General: Skin is warm and dry.      Coloration: Skin is not jaundiced or pale.   Neurological:      General: No focal deficit present.      Mental Status: She is alert and oriented to person, place, and time. Mental status is at baseline.   Psychiatric:         Mood and Affect: Mood normal.         Behavior: Behavior normal.         Thought Content: Thought content normal.         Judgment: Judgment normal.            Vents:  Vent Mode: Spont (07/16/24 1003)  Set Rate: 15 BPM (07/16/24 0903)  Vt Set: 420 mL (07/16/24 0903)  Pressure Support: 5 cmH20 (07/16/24 1003)  PEEP/CPAP: 5 cmH20  (07/16/24 1003)  Oxygen Concentration (%): 30 (07/16/24 1003)  Peak Airway Pressure: 10 cmH20 (07/16/24 1003)  Plateau Pressure: 0 cmH20 (07/16/24 1003)  Total Ve: 5.45 L/m (07/16/24 1003)  Negative Inspiratory Force (cm H2O): 0 (07/16/24 1003)  Lines/Drains/Airways       Central Venous Catheter Line  Duration                  Hemodialysis Catheter 07/11/24 1219 left subclavian 9 days    Trialysis (Dialysis) Catheter 07/20/24 1120 left femoral <1 day                  Significant Labs:    CBC/Anemia Profile:  Recent Labs   Lab 07/19/24  0728 07/19/24  0947 07/20/24  0252 07/20/24  0956   WBC 17.32* 21.02* 24.21* 26.38*   HGB 5.7* 8.4* 9.3* 9.5*   HCT 17.8* 27.1* 27.3* 30.5*    271 295 277   MCV 80* 82 78* 81*   RDW 21.2* 21.2* 21.4* 21.5*   IRON 20*  --   --   --    FERRITIN 169  --   --   --         Chemistries:  Recent Labs   Lab 07/19/24  0728 07/20/24  0252 07/20/24  1402    136 141   K 3.7 4.7 4.7   * 106 111*   CO2 17* 20* 22*   BUN 29* 46* 22   CREATININE 3.5* 5.1* 2.5*   CALCIUM 6.4* 8.1* 7.7*   ALBUMIN 1.2* 1.4* 1.3*   PROT 3.6* 4.4*  --    BILITOT 0.8 0.7  --    ALKPHOS 114 152*  --    ALT <5* <5*  --    AST 14 22  --    MG  --  2.4 2.1   PHOS 4.2 5.7* 3.1       All pertinent labs within the past 24 hours have been reviewed.    Significant Imaging: I have reviewed all pertinent imaging results/findings within the past 24 hours.    ABG  Recent Labs   Lab 07/15/24  1557   PH 7.314*   PO2 92   PCO2 38.3   HCO3 19.5*   BE -7*     Assessment/Plan:     Pulmonary  Acute respiratory failure with hypoxia  Patient with Hypoxic Respiratory failure which is Acute.  she is not on home oxygen. Supplemental oxygen was provided and noted-      .   Signs/symptoms of respiratory failure include- respiratory distress. Contributing diagnoses includes - Aspiration Labs and images were reviewed. Patient Has recent ABG, which has been reviewed. Will treat underlying causes and adjust management of respiratory  failure as follows-     -- Extubated this am 07/16  -- Pulmonary toilet  --Wean supplemental oxygen while maintaining an O2 Sat range of 88-92%      Cardiac/Vascular  Atrial fibrillation with RVR  AFib with RVR appears new following cardiac arrest at OSH. Per chart review, AFib was difficult to control. Patient endorses amiodarone reaction with itching, rash, and oral burning. Refused Sotalol, DCCV, and AICD placement at OSH. Was placed on Diltizem infusion and transitioned to PO metoprolol. Amio gtt restarted overnight 7/11 s/t uncontrolled afib rvr. Transitioned to PO amio.    - Continue oral amiodarone, adding metoprolol IV prn for heart rate >120.  - Apixaban on hold.  Anticoagulation with a heparin infusion.  - Daily CMP  - Keep K>4, Mag >2    Acute on chronic combined systolic and diastolic heart failure  Echo    Left Ventricle: The left ventricle is moderately dilated. Normal wall thickness. Global hypokinesis present. There is severely reduced systolic function with a visually estimated ejection fraction of 20 - 25%.    Right Ventricle: Mild right ventricular enlargement. Wall thickness is normal. Right ventricle wall motion has global hypokinesis. Systolic function is moderately reduced.    Left Atrium: Left atrium is severely dilated.    Right Atrium: Right atrium is severely dilated.    Aortic Valve: The aortic valve is a trileaflet valve. There is mild aortic valve sclerosis.    Mitral Valve: There is mild regurgitation.    Tricuspid Valve: There is severe regurgitation.    Pulmonary Artery: There is moderate pulmonary hypertension. The estimated pulmonary artery systolic pressure is 61 mmHg.    IVC/SVC: Elevated venous pressure at 15 mmHg.    Pericardium: There is a trivial circumferential effusion. No indication of cardiac tamponade.    - Will require interventional cardiology for LHC vs PET for reduced EF once euvolemic  - LifeVest prior to discharge  - Entresto recommended by cardiology, but  currently on hold due to hypotension.    Essential hypertension  Takes Coreg at home.   Currently on hole due to hypotension.    Renal/  * ESRD (end stage renal disease)  Pt previously on PD. Unable to remove enough volume with PD catheter. Initially upgraded to MICU for emergent trialysis placement and CRRT. Now with tunneled dialysis catheter. Tolerated session of HD on 7/12 without complications.     - Nephrology following, appreciate recs  - Strict I/Os  - Renally dose meds  - Avoid nephrotoxic agents  - PD catheter removed 07/15  - Tunneled dialysis catheter was not functioning overnight. New trialysis line placed in left femoral vein.  - Continue SLED per nephro recs    Endocrine  Severe obesity (BMI >= 40)  Morbid obesity complicating her medical care.    Type 2 diabetes mellitus with microalbuminuria, without long-term current use of insulin  Latest A1C 6.8; on Metformin at home    - POC glucose  - Hypoglycemia protocol    Palliative Care  Palliative care encounter  - Palliative care following, continued GOC with family. Plan for family meeting today 07/17    ACP (advance care planning)  - Palliative care consulted, appreciate their assistance in clarification of GOC/POC with patient and family      Left arm pain     -likely 2/2 infiltrated IV.  Antecubital IV removed.  Warm compress ordered for area.       Critical Care Daily Checklist:    A: Awake: RASS Goal/Actual Goal: RASS Goal: 0-->alert and calm  Actual:     B: Spontaneous Breathing Trial Performed? Spon. Breathing Trial Initiated?: Initiated (07/16/24 1003)   C: SAT & SBT Coordinated?  N/A                D: Delirium: CAM-ICU Overall CAM-ICU: Negative   E: Early Mobility Performed? No   F: Feeding Goal: Goals: Meet % EEN, EPN by RD f/u date  Status: Nutrition Goal Status: new   Current Diet Order   Procedures    Diet Renal      AS: Analgesia/Sedation Tylenol   T: Thromboembolic Prophylaxis On a heparin infusion   H: HOB > 300 Yes   U: Stress  Ulcer Prophylaxis (if needed) N/A   G: Glucose Control N/A   B: Bowel Function Stool Occurrence: 1   I: Indwelling Catheter (Lines & Damon) Necessity Tunnel left subclavian  Left femoral trialysis   D: De-escalation of Antimicrobials/Pharmacotherapies N/A    Plan for the day/ETD SLED per nephrology    Code Status:  Family/Goals of Care: DNR       Critical Care Time: 48 minutes  Critical secondary to Patient has a condition that poses threat to life and bodily function: Acute Renal Failure      Critical care was time spent personally by me on the following activities: development of treatment plan with patient or surrogate and bedside caregivers, discussions with consultants, evaluation of patient's response to treatment, examination of patient, ordering and performing treatments and interventions, ordering and review of laboratory studies, ordering and review of radiographic studies, pulse oximetry, re-evaluation of patient's condition. This critical care time did not overlap with that of any other provider or involve time for any procedures.     Rochelle Cuellar MD  Critical Care Medicine  [facility]

## 2024-07-20 NOTE — PROGRESS NOTES
07/20/24 0256   Treatment   Treatment Type SLED   Treatment Status New start   Dialysis Machine Number [id number]   Dialyzer Time (hours) 0   BVP (Liters) 0 L   Solutions Labeled and Current  Yes   Access Tunneled Cath;Left;IJ   Catheter Dressing Intact  Yes   Alarms Engaged Yes   CRRT Comments SLED started   Prescription   Time (Hours) 12   Dialysate K + (mEq/L) 4   Dialysate CA + (mEq/L) 2.25   Dialysate HCO3 - (Bicarb) (mEq/L) 30   Dialysate Na + (mEq/L)   (150 for the first 4 hour then decrease Na bath 1 meq/hr until we reach 144)   CRRT Hourly Documentation   Blood Flow (mL/min) 150   UF Rate 200 cc/hr   Arterial Pressure (mmHg) -50 mmHg   Venous Pressure (mmHg) 60 mmHg   Effluent Pressure (EP) (mmHg) 20 mmHg     SLED for 12 hours started per MD orders. VS stable to start tx. Lines reversed. Starting UF set at 200 with a goal of 300. Report given to primary nurse.

## 2024-07-20 NOTE — PLAN OF CARE
SICU PLAN OF CARE    Dx: ESRD (end stage renal disease)    Goals of Care: CRRT and monitor pressures.     Vital Signs (last 12 hours):   Temp:  [97.5 °F (36.4 °C)-98.1 °F (36.7 °C)]   Pulse:  []   Resp:  [17-25]   BP: ()/()   SpO2:  [91 %-100 %]      Neuro: AAOx4, Follows commands , and Moves all extremities spontaneously     Cardiac: A fib.     Respiratory:  2L Nasal Cannula     Gtts: Norepinephrine    Urine Output: CRRT    Diet: Renal     Labs/Accuchecks: Accuchecks ACHS. Labs monitored throughout shift.     Skin:  See skin flowsheets for wound assessments.  Patient turned q2h, bony prominences protected, and mattress inflated/working correctly.   Jus Score: 15. If Jus Score is 16 or less, complete 4EYES note each shift.    Shift Events:  No significant events noted.  See flowsheet for further assessment/details.  Family updated on current condition/plan of care, questions answered, and emotional support provided.  MD updated on current condition, vitals, labs, and gtts.        Nurses Note -- 4 Eyes  7/20/2024   8:39 AM      Skin assessed during: Q Shift      [] No Altered Skin Integrity Present    []Prevention Measures Documented      [x] Yes- Altered Skin Integrity Present or Discovered   [] LDA Added if Not in Epic (Describe Wound)   [] New Altered Skin Integrity was Present on Admit and Documented in LDA   [] Wound Image Taken    Wound Care Consulted? Yes    Attending Nurse:  Amira Collins RN     Second RN/Staff Member: Dominique Gross RN

## 2024-07-20 NOTE — PROGRESS NOTES
07/20/24 1200 07/20/24 1234   Treatment   Treatment Type SLED SLED   Treatment Status Clotting Restart   Dialysis Machine Number  --  [id number]   Dialyzer Time (hours) 6.3 0   BVP (Liters) 53.7 L 0 L   Solutions Labeled and Current   --  Yes   Access  --  Temporary Cath;Left;Femoral   Catheter Dressing Intact   --  Yes   Alarms Engaged  --  Yes   CRRT Comments  --  sled restarted as ordered   Prescription   Time (Hours)  --  Continuous   Dialysate K + (mEq/L)  --  4   Dialysate CA + (mEq/L)  --  2.25   Dialysate HCO3 - (Bicarb) (mEq/L)  --  30   Dialysate Na + (mEq/L)  --  145   Cartridge Type  --  Other  (r300)   Dialysate Flow Rate (mL/min)  --  200   UF Goal Rate  --  300 mL/hr   CRRT Hourly Documentation   Blood Flow (mL/min)  --  200   UF Rate  --  250 cc/hr   Arterial Pressure (mmHg)  --  -60 mmHg   Venous Pressure (mmHg)  --  80 mmHg   Effluent Pressure (EP) (mmHg)  --  20 mmHg   Total UF (Hourly Cleared) (mL) 212 0     Clotting- unable to return blood per primary RN. SLED restarted with heparin gtt as ordered. Left femoral CVC aspirated, flushed, and accessed using aseptic technique. Lines connected and secured.

## 2024-07-20 NOTE — SUBJECTIVE & OBJECTIVE
Interval History:   The patient had clotting in the circulation with venous chamber clot. She was rinsed back.  Femoral Trialysis was placed to facilitate better low and proper dialysis. Will run 12 hour SLED. Today and adjust the orders according to the h    Review of patient's allergies indicates:   Allergen Reactions    Percocet [oxycodone-acetaminophen] Itching    Januvia [sitagliptin]     Jardiance [empagliflozin]      Leg cramps    Lipitor [atorvastatin] Other (See Comments)     Severe leg pain    Linaclotide Other (See Comments) and Nausea And Vomiting     Does not remember    Lubiprostone Other (See Comments) and Palpitations     Does not remember     Current Facility-Administered Medications   Medication Frequency    0.9%  NaCl infusion (CRRT USE ONLY) Continuous    0.9%  NaCl infusion (for blood administration) Q24H PRN    acetaminophen tablet 650 mg Q6H PRN    amiodarone tablet 400 mg BID    Followed by    [START ON 7/22/2024] amiodarone tablet 200 mg Daily    dextromethorphan-guaiFENesin  mg/5 ml liquid 5 mL Q4H PRN    dextrose 10% bolus 125 mL 125 mL PRN    dextrose 10% bolus 250 mL 250 mL PRN    glucagon (human recombinant) injection 1 mg PRN    glucose chewable tablet 16 g PRN    glucose chewable tablet 24 g PRN    heparin 25,000 units in dextrose 5% 250 mL (100 units/mL) infusion (heparin infusion - NO NOMOGRAM) Continuous    LIDOcaine 5 % patch 2 patch Q24H    magnesium sulfate 2g in water 50mL IVPB (premix) PRN    miconazole NITRATE 2 % top powder BID    midodrine tablet 10 mg Daily PRN    midodrine tablet 10 mg TID    naloxone 0.4 mg/mL injection 0.02 mg PRN    NORepinephrine 4 mg in dextrose 5% 250 mL infusion (premix) Continuous    ondansetron injection 4 mg Q6H PRN    simethicone chewable tablet 80 mg TID PRN    sodium chloride 0.9% flush 10 mL PRN    sodium chloride 0.9% flush 10 mL PRN       Objective:     Vital Signs (Most Recent):  Temp: 97.8 °F (36.6 °C) (07/20/24 1500)  Pulse: 92  (07/20/24 1600)  Resp: (!) 23 (07/20/24 1600)  BP: (!) 99/52 (07/20/24 1600)  SpO2: 95 % (07/20/24 1600) Vital Signs (24h Range):  Temp:  [97.5 °F (36.4 °C)-98.2 °F (36.8 °C)] 97.8 °F (36.6 °C)  Pulse:  [] 92  Resp:  [13-29] 23  SpO2:  [91 %-100 %] 95 %  BP: ()/() 99/52     Weight: 128.5 kg (283 lb 4.7 oz) (07/17/24 1209)  Body mass index is 44.37 kg/m².  Body surface area is 2.46 meters squared.    I/O last 3 completed shifts:  In: 709 [I.V.:709]  Out: 414 [Other:414]       Vitals:    07/20/24 1515 07/20/24 1530 07/20/24 1545 07/20/24 1600   BP: 108/64 (!) 108/52 (!) 90/53 (!) 99/52   BP Location:    Left arm   Patient Position:    Lying   Pulse: 90 98 93 92   Resp: (!) 24 (!) 27 (!) 28 (!) 23   Temp:       TempSrc:       SpO2: (!) 92% (!) 94% 95% 95%   Weight:       Height:          Physical Exam  HENT:      Head: Normocephalic.   Neck:      Comments: Lt IJ cuffed tunneled HD cath.   Cardiovascular:      Rate and Rhythm: Normal rate.   Pulmonary:      Effort: Pulmonary effort is normal.   Musculoskeletal:      Right lower leg: Edema present.      Left lower leg: Edema present.   Neurological:      Mental Status: She is alert.          Significant Labs:  BMP:   Recent Labs   Lab 07/20/24  1402   *      K 4.7   *   CO2 22*   BUN 22   CREATININE 2.5*   CALCIUM 7.7*   MG 2.1     CBC:   Recent Labs   Lab 07/20/24  0956   WBC 26.38*   RBC 3.75*   HGB 9.5*   HCT 30.5*      MCV 81*   MCH 25.3*   MCHC 31.1*     CMP:   Recent Labs   Lab 07/20/24  0252 07/20/24  1402    132*   CALCIUM 8.1* 7.7*   ALBUMIN 1.4* 1.3*   PROT 4.4*  --     141   K 4.7 4.7   CO2 20* 22*    111*   BUN 46* 22   CREATININE 5.1* 2.5*   ALKPHOS 152*  --    ALT <5*  --    AST 22  --    BILITOT 0.7  --      Coagulation:   Recent Labs   Lab 07/15/24  1458 07/16/24  1205 07/18/24  0321   INR 1.1  --   --    APTT 30.6   < > 32.0    < > = values in this interval not displayed.     LFTs:   Recent  Labs   Lab 07/20/24  0252 07/20/24  1402   ALT <5*  --    AST 22  --    ALKPHOS 152*  --    BILITOT 0.7  --    PROT 4.4*  --    ALBUMIN 1.4* 1.3*     All labs within the past 24 hours have been reviewed.     Impression and plan:  Acute Resp. Failure with Hypoxemia.    ESRD shifted from PD to HD with Lt IJ cuffed tunneled Catheter. She had flow issues with clotting with the catheter. Will plan to exchange under anesthesia next week. A femoral temporary line was inserted today will continue the 12 hour daily SLED for UF and clearance.   Rt. Nephrectomy for RCC  HTN with labile BP.   DM with stable sugar.   A fib.  With rapid rate.   PAD  CHF with Ischemic cardiomyopathy.   CAD post cardiac arrest 6/25/2024  NICOLE  Morbid obesity  BMI 44  Gout    PENNY MOLINA.Alexandro. MD. ALISSA. FACP.  , [employer] / [employer] ([place]).  Nephrology Consultant. [facility].   [facility]. [address]th [place] [address].    email: [email or web address]  Tel: Office: [phone]

## 2024-07-20 NOTE — ASSESSMENT & PLAN NOTE
Echo    Left Ventricle: The left ventricle is moderately dilated. Normal wall thickness. Global hypokinesis present. There is severely reduced systolic function with a visually estimated ejection fraction of 20 - 25%.    Right Ventricle: Mild right ventricular enlargement. Wall thickness is normal. Right ventricle wall motion has global hypokinesis. Systolic function is moderately reduced.    Left Atrium: Left atrium is severely dilated.    Right Atrium: Right atrium is severely dilated.    Aortic Valve: The aortic valve is a trileaflet valve. There is mild aortic valve sclerosis.    Mitral Valve: There is mild regurgitation.    Tricuspid Valve: There is severe regurgitation.    Pulmonary Artery: There is moderate pulmonary hypertension. The estimated pulmonary artery systolic pressure is 61 mmHg.    IVC/SVC: Elevated venous pressure at 15 mmHg.    Pericardium: There is a trivial circumferential effusion. No indication of cardiac tamponade.    - Will require interventional cardiology for LHC vs PET for reduced EF once euvolemic  - LifeVest prior to discharge  - Entresto recommended by cardiology, but currently on hold due to hypotension.

## 2024-07-20 NOTE — EICU
Nurses Note -- 4 Eyes      7/20/2024   2:08 AM      Skin assessed during: Transfer      [] No Altered Skin Integrity Present    []Prevention Measures Documented      [x] Yes- Altered Skin Integrity Present or Discovered   [x] LDA Added if Not in Epic (Describe Wound)   [] New Altered Skin Integrity was Present on Admit and Documented in LDA   [] Wound Image Taken    Wound Care Consulted? Yes    Attending Nurse:  Amber Strickland RN/Staff Member:  Dominique

## 2024-07-20 NOTE — SUBJECTIVE & OBJECTIVE
Review of Systems   Respiratory:  Negative for shortness of breath.    Cardiovascular:  Negative for chest pain.   Musculoskeletal:  Positive for myalgias (Right arm pain).   All other systems reviewed and are negative.    Objective:     Vital Signs (Most Recent):  Temp: 97.8 °F (36.6 °C) (07/20/24 1500)  Pulse: 88 (07/20/24 1615)  Resp: (!) 25 (07/20/24 1615)  BP: (!) 81/62 (07/20/24 1615)  SpO2: (!) 93 % (07/20/24 1615) Vital Signs (24h Range):  Temp:  [97.5 °F (36.4 °C)-98.2 °F (36.8 °C)] 97.8 °F (36.6 °C)  Pulse:  [] 88  Resp:  [13-29] 25  SpO2:  [91 %-100 %] 93 %  BP: ()/() 81/62   Weight: 128.5 kg (283 lb 4.7 oz)  Body mass index is 44.37 kg/m².      Intake/Output Summary (Last 24 hours) at 7/20/2024 1635  Last data filed at 7/20/2024 1600  Gross per 24 hour   Intake 1816.36 ml   Output 1878 ml   Net -61.64 ml          Physical Exam  Vitals and nursing note reviewed.   Constitutional:       General: She is not in acute distress.     Appearance: She is ill-appearing. She is not toxic-appearing or diaphoretic.   HENT:      Head: Normocephalic and atraumatic.      Right Ear: External ear normal.      Left Ear: External ear normal.      Nose: Nose normal.   Cardiovascular:      Rate and Rhythm: Tachycardia present. Rhythm irregular.      Pulses: Normal pulses.      Heart sounds: Normal heart sounds. No murmur heard.     No friction rub. No gallop.      Comments: No JVD but with 1+ pitting edema bilaterally  Pulmonary:      Effort: Pulmonary effort is normal. No respiratory distress.      Breath sounds: Normal breath sounds. No wheezing, rhonchi or rales.   Abdominal:      General: Abdomen is flat. Bowel sounds are normal. There is no distension.      Palpations: Abdomen is soft.      Tenderness: There is no abdominal tenderness. There is no guarding or rebound.   Musculoskeletal:         General: Swelling present. No tenderness. Normal range of motion.      Comments: Tender area just above the  right antecubital area.   Skin:     General: Skin is warm and dry.      Coloration: Skin is not jaundiced or pale.   Neurological:      General: No focal deficit present.      Mental Status: She is alert and oriented to person, place, and time. Mental status is at baseline.   Psychiatric:         Mood and Affect: Mood normal.         Behavior: Behavior normal.         Thought Content: Thought content normal.         Judgment: Judgment normal.            Vents:  Vent Mode: Spont (07/16/24 1003)  Set Rate: 15 BPM (07/16/24 0903)  Vt Set: 420 mL (07/16/24 0903)  Pressure Support: 5 cmH20 (07/16/24 1003)  PEEP/CPAP: 5 cmH20 (07/16/24 1003)  Oxygen Concentration (%): 30 (07/16/24 1003)  Peak Airway Pressure: 10 cmH20 (07/16/24 1003)  Plateau Pressure: 0 cmH20 (07/16/24 1003)  Total Ve: 5.45 L/m (07/16/24 1003)  Negative Inspiratory Force (cm H2O): 0 (07/16/24 1003)  Lines/Drains/Airways       Central Venous Catheter Line  Duration                  Hemodialysis Catheter 07/11/24 1219 left subclavian 9 days    Trialysis (Dialysis) Catheter 07/20/24 1120 left femoral <1 day                  Significant Labs:    CBC/Anemia Profile:  Recent Labs   Lab 07/19/24  0728 07/19/24  0947 07/20/24  0252 07/20/24  0956   WBC 17.32* 21.02* 24.21* 26.38*   HGB 5.7* 8.4* 9.3* 9.5*   HCT 17.8* 27.1* 27.3* 30.5*    271 295 277   MCV 80* 82 78* 81*   RDW 21.2* 21.2* 21.4* 21.5*   IRON 20*  --   --   --    FERRITIN 169  --   --   --         Chemistries:  Recent Labs   Lab 07/19/24  0728 07/20/24  0252 07/20/24  1402    136 141   K 3.7 4.7 4.7   * 106 111*   CO2 17* 20* 22*   BUN 29* 46* 22   CREATININE 3.5* 5.1* 2.5*   CALCIUM 6.4* 8.1* 7.7*   ALBUMIN 1.2* 1.4* 1.3*   PROT 3.6* 4.4*  --    BILITOT 0.8 0.7  --    ALKPHOS 114 152*  --    ALT <5* <5*  --    AST 14 22  --    MG  --  2.4 2.1   PHOS 4.2 5.7* 3.1       All pertinent labs within the past 24 hours have been reviewed.    Significant Imaging: I have reviewed all  pertinent imaging results/findings within the past 24 hours.

## 2024-07-20 NOTE — EICU
Intervention Initiated From:  Bedside    Cristela intervened regarding:  Time-Out    Comments:  10:00  Called into room via elert for insertion of HD catheter.  Physician verification done for Ginny Molina/ Pulmonary Med/CC & CNS Winterbottom, Fiona w/ Pulm/CC.   10:02  Time-out completed using proper pt identifiers.  11:20  Trialysis catheter inserted via left femoral site per Dr. Cuellar assisted by CNS Winterbottom using u/s guidance.  Manometry performed.  Blood return to all ports noted.  Secured & sutured.  Pt tolerated procedure well.

## 2024-07-20 NOTE — PLAN OF CARE
SICU PLAN OF CARE    Dx: ESRD (end stage renal disease)    Goals of Care: MAP>60    Vital Signs (last 12 hours):   Temp:  [97.5 °F (36.4 °C)-97.8 °F (36.6 °C)]   Pulse:  []   Resp:  [13-29]   BP: ()/()   SpO2:  [91 %-100 %]      Neuro: AAOx4, Follows commands , and Moves all extremities spontaneously     Cardiac: AFIB, Tachycardic throughout and resolved,       Respiratory: Room Air    Urine Output: Voids Spontaneously  0 mL/shift      Diet: Regular Renal diet     Labs/Accuchecks: cmp,renal panel, mag, phosp, Q8H    Skin:  cleansed with soap and water and Barrier cream applied to buttocks. Cleansed Bilateral elbows and applied xeroform and wrapped with Kerlix. Patient turned q2h, bony prominences protected, and mattress inflated/working correctly.   Jus Score: 15.     Shift Events:  CRRT unable to flush back due to clots in venous chamber, Trialysis placed in femoral artery by MD Cuellar at bedside. Trialysis intact and CRRT restarted and flowing. Pt ran of Afib and tachycardic, one time dose of metoprolol given and effective. Multiple loose stools this shift MD placed order for stool culture.  See flowsheet for further assessment/details.  Family updated on current condition/plan of care, questions answered, and emotional support provided.  MD updated on current condition, vitals, labs, and gtts.        Nurses Note -- 4 Eyes  7/20/2024   1800      Skin assessed during: Q Shift      [] No Altered Skin Integrity Present    []Prevention Measures Documented      [x] Yes- Altered Skin Integrity Present or Discovered   [] LDA Added if Not in Epic (Describe Wound)   [x] New Altered Skin Integrity was Present on Admit and Documented in LDA   [] Wound Image Taken    Wound Care Consulted? Yes    Attending Nurse:  Allie Strickland RN/Staff Member:  Sho

## 2024-07-20 NOTE — PROCEDURES
"Juhi Taylor is a 74 y.o. female patient.    Temp: 97.8 °F (36.6 °C) (07/20/24 1500)  Pulse: 96 (07/20/24 1630)  Resp: (!) 22 (07/20/24 1630)  BP: (!) 99/55 (07/20/24 1630)  SpO2: (!) 94 % (07/20/24 1630)  Weight: 128.5 kg (283 lb 4.7 oz) (07/17/24 1209)  Height: 5' 7" (170.2 cm) (07/17/24 1209)       Central Line    Date/Time: 7/20/2024 10:00 AM    Performed by: Rochelle Cuellar MD  Authorized by: Rochelle Cuellar MD    Location procedure was performed:  [facility] (SICU)  Assisting Provider:  Winterbottom, Fiona, APRN, CNS  Pre-operative diagnosis:  Renal failure  Post-operative diagnosis:  Renal failure  Consent Done ?:  Yes  Time out complete?: Verified correct patient, procedure, equipment, staff, and site/side    Indications:  Hemodialysis and med administration  Anesthesia:  Local infiltration  Local anesthetic:  Lidocaine 1% without epinephrine  Anesthetic total (ml):  5  Preparation:  Skin prepped with ChloraPrep  Skin prep agent dried: Skin prep agent completely dried prior to procedure    Sterile barriers: All five maximal sterile barriers used - gloves, gown, cap, mask and large sterile sheet    Hand hygiene: Hand hygiene performed immediately prior to central venous catheter insertion    Location:  Left femoral  Site selection rationale:  Preserving IJ vasculature per nephrology request  Catheter type:  Trialysis  Catheter size:  13 Fr  Ultrasound guidance: Yes    Vessel Caliber:  Medium   patent  Comprressibility:  Normal  Needle advanced into vessel with real time ultrasound guidance.    Guidewire confirmed in vessel.    Steril sheath on probe.    Sterile gel used.  Manometry: Yes    Number of attempts:  3  Securement:  Line sutured, chlorhexidine patch, sterile dressing applied and blood return through all ports  Complications: No    Specimens: No    Implants: No    XRay:  Successful placement  Adverse Events:  NoneTermination Site: inferior vena cava      7/20/2024    "

## 2024-07-20 NOTE — CONSULTS
NIAS consulted again for PVA; last seen on 7/12, pt still has no veins accessible in RA, unable to use LA, nurse notified

## 2024-07-20 NOTE — ASSESSMENT & PLAN NOTE
AFib with RVR appears new following cardiac arrest at OSH. Per chart review, AFib was difficult to control. Patient endorses amiodarone reaction with itching, rash, and oral burning. Refused Sotalol, DCCV, and AICD placement at OSH. Was placed on Diltizem infusion and transitioned to PO metoprolol. Amio gtt restarted overnight 7/11 s/t uncontrolled afib rvr. Transitioned to PO amio.    - Continue oral amiodarone, adding metoprolol IV prn for heart rate >120.  - Apixaban on hold.  Anticoagulation with a heparin infusion.  - Daily CMP  - Keep K>4, Mag >2

## 2024-07-20 NOTE — ASSESSMENT & PLAN NOTE
Patient with Hypoxic Respiratory failure which is Acute.  she is not on home oxygen. Supplemental oxygen was provided and noted-      .   Signs/symptoms of respiratory failure include- respiratory distress. Contributing diagnoses includes - Aspiration Labs and images were reviewed. Patient Has recent ABG, which has been reviewed. Will treat underlying causes and adjust management of respiratory failure as follows-     -- Extubated this am 07/16  -- Pulmonary toilet  --Wean supplemental oxygen while maintaining an O2 Sat range of 88-92%

## 2024-07-20 NOTE — ASSESSMENT & PLAN NOTE
Pt previously on PD. Unable to remove enough volume with PD catheter. Initially upgraded to MICU for emergent trialysis placement and CRRT. Now with tunneled dialysis catheter. Tolerated session of HD on 7/12 without complications.     - Nephrology following, appreciate recs  - Strict I/Os  - Renally dose meds  - Avoid nephrotoxic agents  - PD catheter removed 07/15  - Tunneled dialysis catheter was not functioning overnight. New trialysis line placed in left femoral vein.  - Continue SLED per nephro recs

## 2024-07-20 NOTE — PROGRESS NOTES
Attempted to see pt for wound care consult; pt on dialysis. No pictures in chart of wounds. If wounds are skin tears, please follow written order guidelines for skin tears. If it is a shallow sacral wound, apply Triad wound cream (order from [facility]). Will follow up on Monday.     Amy Aranda RN, [facility] Alex iris Wound/Ostomy  7/20/24

## 2024-07-20 NOTE — PROGRESS NOTES
[facility] - Surgical Intensive Care  Nephrology  Progress Note    Patient Name: Juhi Taylor  MRN: [id number]  Admission Date: 7/3/2024  Hospital Length of Stay: 17 days  Attending Provider: Rochelle Cuellar MD   Primary Care Physician: Tony Sadler MD  Principal Problem:ESRD (end stage renal disease)    Subjective:     HPI: 74-year-old female with prior history of CKD stage 5 with recent PD catheter placement on 6/4, RCC s/p right nephrectomy, T2DM, obesity, CAD s/p PCI to Lcx and LAD in 11/2020 and HFmREF who is admitted as a transfer from [facility] for higher level of care and further cardiac evaluation. Nephrology consulted for hemodialysis.     She initially presented on 6/18/24 to [facility] for hypervolemia in the setting of CKD 4-5 for which she had undergone elective PD catheter placement complicated by catheter site leaking. She had a complex hospital course, was initially placed on furosemide gtt without significant improvement and later underwent placement of tunneled line for HD with volume removal. During her second dialysis session she went into PEA arrest for which she was resuscitated, intubated and transferred to ICU. She was noted to have elevated troponin and subsequent TTE found reduced EF from 40-45% to 20-25% for which coronary angiography was recommended. Her hospital course was also complicated by dialysis line infection, significant bleeding around the catheter site, bradycardia and subsequent atrial fibrillation with RVR. Given the catheter site bleeding, anticoagulation was temporarily held. She has reported allergy to amiodarone, was initially placed on diltiazem for atrial fibrillation despite reduced EF. She discussed GOC at outside hospital where it appears that she lacked capacity per note and decided to pursue higher level of care at [facility]. At that time, gen surgery was planning to place a left tunneled catheter due to concerns of the right side not being suitable    Upon admit,  patient appeared drowsy but conversant. Daughter reports that she typically gets lethargic 2/2 to the volume overload due to not receiving dialysis. Patient reported b/l left lower extremity pain. Daughter concerned about her not having dialysis in 4 days and wants a session today.     Interval History:   The patient had clotting in the circulation with venous chamber clot. She was rinsed back.  Femoral Trialysis was placed to facilitate better low and proper dialysis. Will run 12 hour SLED. Today and adjust the orders according to the h    Review of patient's allergies indicates:   Allergen Reactions    Percocet [oxycodone-acetaminophen] Itching    Januvia [sitagliptin]     Jardiance [empagliflozin]      Leg cramps    Lipitor [atorvastatin] Other (See Comments)     Severe leg pain    Linaclotide Other (See Comments) and Nausea And Vomiting     Does not remember    Lubiprostone Other (See Comments) and Palpitations     Does not remember     Current Facility-Administered Medications   Medication Frequency    0.9%  NaCl infusion (CRRT USE ONLY) Continuous    0.9%  NaCl infusion (for blood administration) Q24H PRN    acetaminophen tablet 650 mg Q6H PRN    amiodarone tablet 400 mg BID    Followed by    [START ON 7/22/2024] amiodarone tablet 200 mg Daily    dextromethorphan-guaiFENesin  mg/5 ml liquid 5 mL Q4H PRN    dextrose 10% bolus 125 mL 125 mL PRN    dextrose 10% bolus 250 mL 250 mL PRN    glucagon (human recombinant) injection 1 mg PRN    glucose chewable tablet 16 g PRN    glucose chewable tablet 24 g PRN    heparin 25,000 units in dextrose 5% 250 mL (100 units/mL) infusion (heparin infusion - NO NOMOGRAM) Continuous    LIDOcaine 5 % patch 2 patch Q24H    magnesium sulfate 2g in water 50mL IVPB (premix) PRN    miconazole NITRATE 2 % top powder BID    midodrine tablet 10 mg Daily PRN    midodrine tablet 10 mg TID    naloxone 0.4 mg/mL injection 0.02 mg PRN    NORepinephrine 4 mg in dextrose 5% 250 mL  infusion (premix) Continuous    ondansetron injection 4 mg Q6H PRN    simethicone chewable tablet 80 mg TID PRN    sodium chloride 0.9% flush 10 mL PRN    sodium chloride 0.9% flush 10 mL PRN       Objective:     Vital Signs (Most Recent):  Temp: 97.8 °F (36.6 °C) (07/20/24 1500)  Pulse: 92 (07/20/24 1600)  Resp: (!) 23 (07/20/24 1600)  BP: (!) 99/52 (07/20/24 1600)  SpO2: 95 % (07/20/24 1600) Vital Signs (24h Range):  Temp:  [97.5 °F (36.4 °C)-98.2 °F (36.8 °C)] 97.8 °F (36.6 °C)  Pulse:  [] 92  Resp:  [13-29] 23  SpO2:  [91 %-100 %] 95 %  BP: ()/() 99/52     Weight: 128.5 kg (283 lb 4.7 oz) (07/17/24 1209)  Body mass index is 44.37 kg/m².  Body surface area is 2.46 meters squared.    I/O last 3 completed shifts:  In: 709 [I.V.:709]  Out: 414 [Other:414]       Vitals:    07/20/24 1515 07/20/24 1530 07/20/24 1545 07/20/24 1600   BP: 108/64 (!) 108/52 (!) 90/53 (!) 99/52   BP Location:    Left arm   Patient Position:    Lying   Pulse: 90 98 93 92   Resp: (!) 24 (!) 27 (!) 28 (!) 23   Temp:       TempSrc:       SpO2: (!) 92% (!) 94% 95% 95%   Weight:       Height:          Physical Exam  HENT:      Head: Normocephalic.   Neck:      Comments: Lt IJ cuffed tunneled HD cath.   Cardiovascular:      Rate and Rhythm: Normal rate.   Pulmonary:      Effort: Pulmonary effort is normal.   Musculoskeletal:      Right lower leg: Edema present.      Left lower leg: Edema present.   Neurological:      Mental Status: She is alert.          Significant Labs:  BMP:   Recent Labs   Lab 07/20/24  1402   *      K 4.7   *   CO2 22*   BUN 22   CREATININE 2.5*   CALCIUM 7.7*   MG 2.1     CBC:   Recent Labs   Lab 07/20/24  0956   WBC 26.38*   RBC 3.75*   HGB 9.5*   HCT 30.5*      MCV 81*   MCH 25.3*   MCHC 31.1*     CMP:   Recent Labs   Lab 07/20/24  0252 07/20/24  1402    132*   CALCIUM 8.1* 7.7*   ALBUMIN 1.4* 1.3*   PROT 4.4*  --     141   K 4.7 4.7   CO2 20* 22*    111*   BUN  46* 22   CREATININE 5.1* 2.5*   ALKPHOS 152*  --    ALT <5*  --    AST 22  --    BILITOT 0.7  --      Coagulation:   Recent Labs   Lab 07/15/24  1458 07/16/24  1205 07/18/24  0321   INR 1.1  --   --    APTT 30.6   < > 32.0    < > = values in this interval not displayed.     LFTs:   Recent Labs   Lab 07/20/24  0252 07/20/24  1402   ALT <5*  --    AST 22  --    ALKPHOS 152*  --    BILITOT 0.7  --    PROT 4.4*  --    ALBUMIN 1.4* 1.3*     All labs within the past 24 hours have been reviewed.     Impression and plan:  Acute Resp. Failure with Hypoxemia.    ESRD shifted from PD to HD with Lt IJ cuffed tunneled Catheter. She had flow issues with clotting with the catheter. Will plan to exchange under anesthesia next week. A femoral temporary line was inserted today will continue the 12 hour daily SLED for UF and clearance.   Rt. Nephrectomy for RCC  HTN with labile BP.   DM with stable sugar.   A fib.  With rapid rate.   PAD  CHF with Ischemic cardiomyopathy.   CAD post cardiac arrest 6/25/2024  NICOLE  Morbid obesity  BMI 44  Gout    PENNY MOLINA.Alexandro. MD. ALISSA. FACP.  , [employer]  Nephrology Consultant. [facility].   [address]. [address]th [place] [address].    email: [email or web address].  Tel: Office: [phone]

## 2024-07-21 PROBLEM — R19.7 DIARRHEA: Status: ACTIVE | Noted: 2024-07-21

## 2024-07-21 NOTE — PROGRESS NOTES
[facility] - Surgical Intensive Care  Critical Care Medicine  Progress Note    Patient Name: Juhi Taylor  MRN: [id number]  Admission Date: 7/3/2024  Hospital Length of Stay: 18 days  Code Status: DNR  Attending Provider: Rochelle Cuellar MD  Primary Care Provider: Tony Sadlre MD   Principal Problem: ESRD (end stage renal disease)    Subjective:     HPI:  Juhi Taylor is a 74-year-old female with a past medical history of CKD stage 5 with recent PD catheter placement on 6/4, RCC s/p right nephrectomy, T2DM, obesity, CAD s/p PCI to Lcx and LAD in 11/2020 and HFmREF who is admitted as a transfer from [facility] for higher level of care and further cardiac evaluation. Nephrology consulted for hemodialysis. She initially presented on 6/18/24 to [facility] for hypervolemia in the setting of CKD 4-5 for which she had undergone elective PD catheter placement complicated by catheter site leaking. She had a complex hospital course, was initially placed on furosemide gtt without significant improvement and later underwent placement of tunneled line for HD with volume removal. During her second dialysis session she went into PEA arrest for which she was resuscitated, intubated and transferred to ICU. She was noted to have elevated troponin and subsequent TTE found reduced EF from 40-45% to 20-25% for which coronary angiography was recommended. Her hospital course was also complicated by dialysis line infection, significant bleeding around the catheter site, bradycardia and subsequent atrial fibrillation with RVR. Given the catheter site bleeding, anticoagulation was temporarily held. She has reported allergy to amiodarone, was initially placed on diltiazem for atrial fibrillation despite reduced EF. She discussed GOC at outside hospital where it appears that she lacked capacity per note and decided to pursue higher level of care at [facility] [place]. At that time, gen surgery was planning to place a left tunneled catheter due to  Cardiology Critical Care Addiction Medicine Critical Care concerns of the right side not being suitable     Upon admit, patient appeared drowsy but conversant. Daughter reports that she typically gets lethargic 2/2 to the volume overload due to not receiving dialysis. Patient reported b/l left lower extremity pain. Daughter concerned about her not having dialysis in 4 days and wants a session today.     Pt initially refusing dialysis. Seen by nephrology. VBG 7.220/33.4/34/13.7/-14. Critical care medicine consulted for emergent trialysis line placement and dialysis.     Ms. Taylor was admitted to MICU on 7/8 for urgent dialysis line placement and initiated of HD. Patient received short run of CRRT overnight without issue. Encephalopathy improving with continued CRRT treatments. Persistent AFib with RVR, received Digoxin loading dose (renally dosed), restarted PO Carvedilol. Patient remains in persistent AFib RVR despite therapeutic digoxin level and uptitration of Carvedilol levels, started on Amiodarone infusion overnight. Transitioned to amio PO, started on heparin gtt for afib AC. Tolerated HD well overnight without complications and less encephalopathic upon exam. Planning for gen surg to remove PD catheter. Upon induction pt vomited copious amounts of bile, was intubated. PD cathter removed successfully. Extubated to NC 7/16.      She has had difficulties with her tunneled HD line with sluggish flow especially through the venous side.  She would have a 4-hour instillation of tPA in each line before being able to run iHD, and even then it would be stopped before completion.  Interventional Nephrology plans to replace the line at some point next week.  Of note, Palliative Care was also consulted and patient had made her wishes known that she is a DNR code status.  She was still vacillating between replacing her tunneled HD line given her adverse event during her last procedure or going home with hospice.  The family was upset with her decision and April, her niece,  asked that Palliative Care not return to see the patient.    On 7/19 Nephrology requested that the patient be stepped back up to the medical ICU for SLED.    Hospital/ICU Course:  Ms. Taylor was admitted to MICU on 7/8 for urgent dialysis line placement and initiated of HD. Patient received short run of CRRT overnight without issue. Encephalopathy improving with continued CRRT treatments. Persistent AFib with RVR, received Digoxin loading dose (renally dosed), restarted PO Carvedilol. Patient remains in persistent AFib RVR despite therapeutic digoxin level and uptitration of Carvedilol levels, started on Amiodarone infusion overnight. Transitioned to amio PO, started on heparin gtt for afib AC. Tolerated HD well overnight without complications and less encephalopathic upon exam. Planning for gen surg to remove PD catheter. Upon induction pt vomited copious amounts of bile, was intubated. PD cathter removed successfully. Extubated to NC 7/16.     7/18:  Patient made DNR after Palliative Care discussions yesterday.  Tolerated SLED 12 hours overnight.  Discussed difficulties regarding HD line with Interventional Nephrology and plans to replace it next week if patient it amenable.  Patient was stepped down from ICU.  7/19:  Stepped back up to medical ICU with plans for overnight SLED x 3 days.    Interval History  Patient has had significant diarrhea over the last 18 hours. Reporting at least 5+ bowel movements overnight.   No other complaints. Afebrile, hemodynamically stable.       Review of Systems   Respiratory:  Negative for shortness of breath.    Cardiovascular:  Negative for chest pain.   Gastrointestinal:  Positive for diarrhea.   Musculoskeletal:  Positive for myalgias (Right arm pain).   All other systems reviewed and are negative.    Objective:     Vital Signs (Most Recent):  Temp: 98.3 °F (36.8 °C) (07/20/24 2300)  Pulse: (!) 113 (07/21/24 1030)  Resp: 20 (07/21/24 1030)  BP: (!) 90/54 (07/21/24  1030)  SpO2: (!) 92 % (07/21/24 1030) Vital Signs (24h Range):  Temp:  [97.8 °F (36.6 °C)-98.8 °F (37.1 °C)] 98.3 °F (36.8 °C)  Pulse:  [] 113  Resp:  [17-86] 20  SpO2:  [80 %-98 %] 92 %  BP: ()/(48-89) 90/54   Weight: 128.5 kg (283 lb 4.7 oz)  Body mass index is 44.37 kg/m².      Intake/Output Summary (Last 24 hours) at 7/21/2024 1228  Last data filed at 7/21/2024 0400  Gross per 24 hour   Intake 2527.14 ml   Output 2952 ml   Net -424.86 ml          Physical Exam  Vitals and nursing note reviewed.   Constitutional:       General: She is not in acute distress.     Appearance: She is not ill-appearing, toxic-appearing or diaphoretic.   HENT:      Head: Normocephalic and atraumatic.      Right Ear: External ear normal.      Left Ear: External ear normal.      Nose: Nose normal.   Cardiovascular:      Rate and Rhythm: Tachycardia present. Rhythm irregular.      Pulses: Normal pulses.      Heart sounds: Normal heart sounds. No murmur heard.     No friction rub. No gallop.      Comments: No JVD but with 1+ pitting edema bilaterally  Pulmonary:      Effort: Pulmonary effort is normal. No respiratory distress.      Breath sounds: Normal breath sounds. No wheezing, rhonchi or rales.   Abdominal:      General: Abdomen is flat. Bowel sounds are normal. There is no distension.      Palpations: Abdomen is soft.      Tenderness: There is no abdominal tenderness. There is no guarding or rebound.   Musculoskeletal:         General: Swelling present. No tenderness. Normal range of motion.      Comments: Tender area just above the right antecubital area.   Skin:     General: Skin is warm and dry.      Coloration: Skin is not jaundiced or pale.   Neurological:      General: No focal deficit present.      Mental Status: She is alert and oriented to person, place, and time. Mental status is at baseline.   Psychiatric:         Mood and Affect: Mood normal.         Behavior: Behavior normal.         Thought Content: Thought  Gastroenterology content normal.         Judgment: Judgment normal.            Vents:  Vent Mode: Spont (07/16/24 1003)  Set Rate: 15 BPM (07/16/24 0903)  Vt Set: 420 mL (07/16/24 0903)  Pressure Support: 5 cmH20 (07/16/24 1003)  PEEP/CPAP: 5 cmH20 (07/16/24 1003)  Oxygen Concentration (%): 30 (07/16/24 1003)  Peak Airway Pressure: 10 cmH20 (07/16/24 1003)  Plateau Pressure: 0 cmH20 (07/16/24 1003)  Total Ve: 5.45 L/m (07/16/24 1003)  Negative Inspiratory Force (cm H2O): 0 (07/16/24 1003)  Lines/Drains/Airways       Central Venous Catheter Line  Duration                  Hemodialysis Catheter 07/11/24 1219 left subclavian 10 days    Trialysis (Dialysis) Catheter 07/20/24 1120 left femoral 1 day                  Significant Labs:    CBC/Anemia Profile:  Recent Labs   Lab 07/20/24  0252 07/20/24  0956 07/21/24  0306   WBC 24.21* 26.38* 25.02*   HGB 9.3* 9.5* 8.5*   HCT 27.3* 30.5* 25.6*    277 274   MCV 78* 81* 77*   RDW 21.4* 21.5* 21.6*        Chemistries:  Recent Labs   Lab 07/20/24  0252 07/20/24  1402 07/20/24  2212 07/21/24  0306    141 141 141   K 4.7 4.7 4.7 4.7    111* 111* 110   CO2 20* 22* 22* 24   BUN 46* 22 14 10   CREATININE 5.1* 2.5* 1.8* 1.4   CALCIUM 8.1* 7.7* 7.3* 7.6*   ALBUMIN 1.4* 1.3* 1.3* 1.3*   PROT 4.4*  --   --  4.2*   BILITOT 0.7  --   --  0.9   ALKPHOS 152*  --   --  169*   ALT <5*  --   --  <5*   AST 22  --   --  22   MG 2.4 2.1 1.9 1.9   PHOS 5.7* 3.1 2.3* 2.1*       All pertinent labs within the past 24 hours have been reviewed.    Significant Imaging: I have reviewed all pertinent imaging results/findings within the past 24 hours.    AB  Recent Labs   Lab 07/15/24  1557   PH 7.314*   PO2 92   PCO2 38.3   HCO3 19.5*   BE -7*     Assessment/Plan:     Pulmonary  Acute respiratory failure with hypoxia  Patient with Hypoxic Respiratory failure which is Acute.  she is not on home oxygen. Supplemental oxygen was provided and noted-      .   Signs/symptoms of respiratory failure include-  respiratory distress. Contributing diagnoses includes - Aspiration Labs and images were reviewed. Patient Has recent ABG, which has been reviewed. Will treat underlying causes and adjust management of respiratory failure as follows-     -- Extubated on 7/16  -- Supplemental oxygen needs continue to improve: Currently on room air with an O2 Sat of 92%  --Wean supplemental oxygen while maintaining an O2 Sat range of 88-92%      Cardiac/Vascular  Atrial fibrillation with RVR  AFib with RVR appears new following cardiac arrest at OSH. Per chart review, AFib was difficult to control. Patient endorses amiodarone reaction with itching, rash, and oral burning. Refused Sotalol, DCCV, and AICD placement at OSH. Was placed on Diltizem infusion and transitioned to PO metoprolol. Amio gtt restarted overnight 7/11 s/t uncontrolled afib rvr. Transitioned to PO amio.    - Patient's rate remained elevated despite amiodarone.  Appeared to respond better to metoprolol. Starting oral metoprolol 12.5mg bid.  - Apixaban on hold.  Anticoagulation with a heparin infusion.  - Daily CMP  - Keep K>4, Mag >2    Acute on chronic combined systolic and diastolic heart failure  Echo    Left Ventricle: The left ventricle is moderately dilated. Normal wall thickness. Global hypokinesis present. There is severely reduced systolic function with a visually estimated ejection fraction of 20 - 25%.    Right Ventricle: Mild right ventricular enlargement. Wall thickness is normal. Right ventricle wall motion has global hypokinesis. Systolic function is moderately reduced.    Left Atrium: Left atrium is severely dilated.    Right Atrium: Right atrium is severely dilated.    Aortic Valve: The aortic valve is a trileaflet valve. There is mild aortic valve sclerosis.    Mitral Valve: There is mild regurgitation.    Tricuspid Valve: There is severe regurgitation.    Pulmonary Artery: There is moderate pulmonary hypertension. The estimated pulmonary artery  systolic pressure is 61 mmHg.    IVC/SVC: Elevated venous pressure at 15 mmHg.    Pericardium: There is a trivial circumferential effusion. No indication of cardiac tamponade.    - Will require interventional cardiology for LHC vs PET for reduced EF once euvolemic  - LifeVest prior to discharge  - Entresto recommended by cardiology, but currently on hold due to hypotension.    Essential hypertension  Takes Coreg at home.   Currently on hole due to hypotension.    Renal/  * ESRD (end stage renal disease)  Pt previously on PD. Unable to remove enough volume with PD catheter. Initially upgraded to MICU for emergent trialysis placement and CRRT. Now with tunneled dialysis catheter. Tolerated session of HD on 7/12 without complications.     - Nephrology following, appreciate recs  - Strict I/Os  - Renally dose meds  - Avoid nephrotoxic agents  - PD catheter removed 07/15  - Tunneled dialysis catheter was not functioning overnight.   - Trialysis catheter placed in left femoral on 7/20  - Continue SLED per nephro recs    Endocrine  Severe obesity (BMI >= 40)  Morbid obesity complicating her medical care.    Type 2 diabetes mellitus with microalbuminuria, without long-term current use of insulin  Latest A1C 6.8; on Metformin at home    - POC glucose  - Hypoglycemia protocol    GI  Diarrhea  Etiology unclear.   Stool studies pending.   Low risk for c,diff as she has not had much in the way of antibiotics recently.  Because she is going to the OR on Tuesday, must r/o all infectious causes including c.diff.    Palliative Care  Palliative care encounter  - Palliative care following, continued GOC with family. Plan for family meeting today 07/17    ACP (advance care planning)  - Palliative care consulted, appreciate their assistance in clarification of GOC/POC with patient and family       Critical Care Daily Checklist:    A: Awake: RASS Goal/Actual Goal: RASS Goal: 0-->alert and calm  Actual:     B: Spontaneous Breathing  Trial Performed? Spon. Breathing Trial Initiated?: Initiated (07/16/24 1003)   C: SAT & SBT Coordinated?  N/A               D: Delirium: CAM-ICU Overall CAM-ICU: Negative   E: Early Mobility Performed? No   F: Feeding Goal: Goals: Meet % EEN, EPN by RD f/u date  Status: Nutrition Goal Status: new   Current Diet Order   Procedures    Diet Renal      AS: Analgesia/Sedation none   T: Thromboembolic Prophylaxis Heparin infusion   H: HOB > 300 Yes   U: Stress Ulcer Prophylaxis (if needed) N/a   G: Glucose Control Well controlled   B: Bowel Function Stool Occurrence: 1   I: Indwelling Catheter (Lines & Damon) Necessity Left femoral trialysis.  Left subclavian tunneled HD cath   D: De-escalation of Antimicrobials/Pharmacotherapies None    Plan for the day/ETD Follow up stool studies.    Code Status:  Family/Goals of Care: DNR       Critical Care Time: 40 minutes  Critical secondary to Patient has a condition that poses threat to life and bodily function: Acute Renal Failure      Critical care was time spent personally by me on the following activities: development of treatment plan with patient or surrogate and bedside caregivers, discussions with consultants, evaluation of patient's response to treatment, examination of patient, ordering and performing treatments and interventions, ordering and review of laboratory studies, ordering and review of radiographic studies, pulse oximetry, re-evaluation of patient's condition. This critical care time did not overlap with that of any other provider or involve time for any procedures.     Rochelle Cuellar MD  Critical Care Medicine  [facility] - Surgical Intensive Care

## 2024-07-21 NOTE — PROGRESS NOTES
07/21/24 0305   Treatment   Treatment Type SLED   Treatment Status Blood returned   Dialyzer Time (hours) 6.23   BVP (Liters) 56.8 L   CRRT Comments Tx completed; blood returned

## 2024-07-21 NOTE — PLAN OF CARE
SICU PLAN OF CARE    Dx: ESRD (end stage renal disease)    Goals of Care: MAP >65    Vital Signs (last 12 hours):   Temp:  [98.1 °F (36.7 °C)-98.4 °F (36.9 °C)]   Pulse:  []   Resp:  [17-30]   BP: ()/(49-89)   SpO2:  [88 %-96 %]      Neuro: AAOx4, Follows commands , and Moves all extremities spontaneously     Cardiac: AFIB, SR    Respiratory: Room Air    Gtts: Heparin    Urine Output: Anuric 0 mL/shift    Diet: Regular Renal Diet       Labs/Accuchecks: APTT Q6H, Accuchecks ACHS    Skin:  No new skin breakdowns noted.  Patient turned q2h, bony prominences protected, and mattress inflated/working correctly.   Jus Score: 15.     Shift Events:  Started on heparin drip with nomogram orders. Placed on contact precautions for cdiff r/o. Pt c/o of itching benedryl prn ordered.  See flowsheet for further assessment/details.  Family updated on current condition/plan of care, questions answered, and emotional support provided.  MD updated on current condition, vitals, labs, and gtts.        Nurses Note -- 4 Eyes  7/21/2024   6:48 PM      Skin assessed during: Q Shift      [] No Altered Skin Integrity Present    []Prevention Measures Documented      [x] Yes- Altered Skin Integrity Present or Discovered   [] LDA Added if Not in Epic (Describe Wound)   [x] New Altered Skin Integrity was Present on Admit and Documented in LDA   [] Wound Image Taken    Wound Care Consulted? Yes    Attending Nurse:  Allie Strickland RN/Staff Member:  Ej

## 2024-07-21 NOTE — CARE UPDATE
The patient had clotted her HD tubing. I have increased Heparin to  500 IU hourly drip during her dialysis.     Please run Am Lab 2 hours after finishing dialysis and add Prot. C, Prot S., Antithrombin III and Antiphospholipid AB panel.      PENNY MOLINA.Alexandro. MD. ALISSA. JORDY.  , [employer] ([place]  Nephrology Consultant. [employer] [facility].   [facility]. 5th [place] [address].    email: [email or web address].  Tel: Office: [phone]

## 2024-07-21 NOTE — ASSESSMENT & PLAN NOTE
Pt previously on PD. Unable to remove enough volume with PD catheter. Initially upgraded to MICU for emergent trialysis placement and CRRT. Now with tunneled dialysis catheter. Tolerated session of HD on 7/12 without complications.     - Nephrology following, appreciate recs  - Strict I/Os  - Renally dose meds  - Avoid nephrotoxic agents  - PD catheter removed 07/15  - Tunneled dialysis catheter was not functioning overnight.   - Trialysis catheter placed in left femoral on 7/20  - Continue SLED per nephro recs

## 2024-07-21 NOTE — NURSING
MD was notified that the pt is refusing to receive scheduled nursing care. Pt states that she just wants to sleep and does not want anyone to wake her up for any care.

## 2024-07-21 NOTE — ASSESSMENT & PLAN NOTE
Etiology unclear.   Stool studies pending.   Low risk for c,diff as she has not had much in the way of antibiotics recently.  Because she is going to the OR on Tuesday, must r/o all infectious causes including c.diff.

## 2024-07-21 NOTE — SUBJECTIVE & OBJECTIVE
Interval History  Patient has had significant diarrhea over the last 18 hours. Reporting at least 5+ bowel movements overnight.   No other complaints. Afebrile, hemodynamically stable.       Review of Systems   Respiratory:  Negative for shortness of breath.    Cardiovascular:  Negative for chest pain.   Gastrointestinal:  Positive for diarrhea.   Musculoskeletal:  Positive for myalgias (Right arm pain).   All other systems reviewed and are negative.    Objective:     Vital Signs (Most Recent):  Temp: 98.3 °F (36.8 °C) (07/20/24 2300)  Pulse: (!) 113 (07/21/24 1030)  Resp: 20 (07/21/24 1030)  BP: (!) 90/54 (07/21/24 1030)  SpO2: (!) 92 % (07/21/24 1030) Vital Signs (24h Range):  Temp:  [97.8 °F (36.6 °C)-98.8 °F (37.1 °C)] 98.3 °F (36.8 °C)  Pulse:  [] 113  Resp:  [17-86] 20  SpO2:  [80 %-98 %] 92 %  BP: ()/(48-89) 90/54   Weight: 128.5 kg (283 lb 4.7 oz)  Body mass index is 44.37 kg/m².      Intake/Output Summary (Last 24 hours) at 7/21/2024 1228  Last data filed at 7/21/2024 0400  Gross per 24 hour   Intake 2527.14 ml   Output 2952 ml   Net -424.86 ml          Physical Exam  Vitals and nursing note reviewed.   Constitutional:       General: She is not in acute distress.     Appearance: She is not ill-appearing, toxic-appearing or diaphoretic.   HENT:      Head: Normocephalic and atraumatic.      Right Ear: External ear normal.      Left Ear: External ear normal.      Nose: Nose normal.   Cardiovascular:      Rate and Rhythm: Tachycardia present. Rhythm irregular.      Pulses: Normal pulses.      Heart sounds: Normal heart sounds. No murmur heard.     No friction rub. No gallop.      Comments: No JVD but with 1+ pitting edema bilaterally  Pulmonary:      Effort: Pulmonary effort is normal. No respiratory distress.      Breath sounds: Normal breath sounds. No wheezing, rhonchi or rales.   Abdominal:      General: Abdomen is flat. Bowel sounds are normal. There is no distension.      Palpations: Abdomen  is soft.      Tenderness: There is no abdominal tenderness. There is no guarding or rebound.   Musculoskeletal:         General: Swelling present. No tenderness. Normal range of motion.      Comments: Tender area just above the right antecubital area.   Skin:     General: Skin is warm and dry.      Coloration: Skin is not jaundiced or pale.   Neurological:      General: No focal deficit present.      Mental Status: She is alert and oriented to person, place, and time. Mental status is at baseline.   Psychiatric:         Mood and Affect: Mood normal.         Behavior: Behavior normal.         Thought Content: Thought content normal.         Judgment: Judgment normal.            Vents:  Vent Mode: Spont (07/16/24 1003)  Set Rate: 15 BPM (07/16/24 0903)  Vt Set: 420 mL (07/16/24 0903)  Pressure Support: 5 cmH20 (07/16/24 1003)  PEEP/CPAP: 5 cmH20 (07/16/24 1003)  Oxygen Concentration (%): 30 (07/16/24 1003)  Peak Airway Pressure: 10 cmH20 (07/16/24 1003)  Plateau Pressure: 0 cmH20 (07/16/24 1003)  Total Ve: 5.45 L/m (07/16/24 1003)  Negative Inspiratory Force (cm H2O): 0 (07/16/24 1003)  Lines/Drains/Airways       Central Venous Catheter Line  Duration                  Hemodialysis Catheter 07/11/24 1219 left subclavian 10 days    Trialysis (Dialysis) Catheter 07/20/24 1120 left femoral 1 day                  Significant Labs:    CBC/Anemia Profile:  Recent Labs   Lab 07/20/24  0252 07/20/24  0956 07/21/24  0306   WBC 24.21* 26.38* 25.02*   HGB 9.3* 9.5* 8.5*   HCT 27.3* 30.5* 25.6*    277 274   MCV 78* 81* 77*   RDW 21.4* 21.5* 21.6*        Chemistries:  Recent Labs   Lab 07/20/24  0252 07/20/24  1402 07/20/24  2212 07/21/24  0306    141 141 141   K 4.7 4.7 4.7 4.7    111* 111* 110   CO2 20* 22* 22* 24   BUN 46* 22 14 10   CREATININE 5.1* 2.5* 1.8* 1.4   CALCIUM 8.1* 7.7* 7.3* 7.6*   ALBUMIN 1.4* 1.3* 1.3* 1.3*   PROT 4.4*  --   --  4.2*   BILITOT 0.7  --   --  0.9   ALKPHOS 152*  --   --  169*    ALT <5*  --   --  <5*   AST 22  --   --  22   MG 2.4 2.1 1.9 1.9   PHOS 5.7* 3.1 2.3* 2.1*       All pertinent labs within the past 24 hours have been reviewed.    Significant Imaging: I have reviewed all pertinent imaging results/findings within the past 24 hours.

## 2024-07-21 NOTE — PLAN OF CARE
"      SICU PLAN OF CARE NOTE    Dx: ESRD (end stage renal disease)    Shift Events: SLED done.    Goals of Care: MAP >65    Neuro: AAO x4, Follows Commands, and Moves All Extremities    Vital Signs: BP (!) 94/52 (BP Location: Left forearm, Patient Position: Lying)   Pulse 108   Temp 98.3 °F (36.8 °C) (Oral)   Resp (!) 21   Ht 5' 7" (1.702 m)   Wt 128.5 kg (283 lb 4.7 oz)   SpO2 (!) 91%   Breastfeeding No   BMI 44.37 kg/m²     Cardiac: A fib    Respiratory: Room Air    Diet: renal diet    Urine Output: Anuric 0 cc/shift    Drains:     Labs/Accuchecks: Daily labs. Accuchecks ACHS.    Skin: No new skin breakdown on shift. Turn pt q2 per protocol orders.    View flowsheet for more assessment details.    "

## 2024-07-21 NOTE — ASSESSMENT & PLAN NOTE
Echo    Left Ventricle: The left ventricle is moderately dilated. Normal wall thickness. Global hypokinesis present. There is severely reduced systolic function with a visually estimated ejection fraction of 20 - 25%.    Right Ventricle: Mild right ventricular enlargement. Wall thickness is normal. Right ventricle wall motion has global hypokinesis. Systolic function is moderately reduced.    Left Atrium: Left atrium is severely dilated.    Right Atrium: Right atrium is severely dilated.    Aortic Valve: The aortic valve is a trileaflet valve. There is mild aortic valve sclerosis.    Mitral Valve: There is mild regurgitation.    Tricuspid Valve: There is severe regurgitation.    Pulmonary Artery: There is moderate pulmonary hypertension. The estimated pulmonary artery systolic pressure is 61 mmHg.    IVC/SVC: Elevated venous pressure at 15 mmHg.    Pericardium: There is a trivial circumferential effusion. No indication of cardiac tamponade.    - Will require interventional cardiology for LHC vs PET for reduced EF once euvolemic  - LifeVest prior to discharge  - Entresto recommended by cardiology, but currently on hold due to hypotension.

## 2024-07-21 NOTE — CARE UPDATE
The patient was able to finish 12 hours of SLED after increasing the hourly Heparin drip to 500 IU/hour. Today hemodynamically she is stable. Her Metabolic and renal profiles are stable.  She is having watery diarrhea. Her WBC is 25 K/uL.     Will not do dialysis today and reassess her  clinical condition  within 24 hours.     With this Leukocytosis we will not be able to move and change her Cuffed tunneled HD cath.     PENNY MOLINA.Alexandro. MD. ALISSA. JORDY.  , [employer] / [employer] ([place]  Nephrology Consultant. [facility].   [address]. 5th [place] [address].    email: [email or web address].  Tel: Office: [phone]

## 2024-07-21 NOTE — ASSESSMENT & PLAN NOTE
Patient with Hypoxic Respiratory failure which is Acute.  she is not on home oxygen. Supplemental oxygen was provided and noted-      .   Signs/symptoms of respiratory failure include- respiratory distress. Contributing diagnoses includes - Aspiration Labs and images were reviewed. Patient Has recent ABG, which has been reviewed. Will treat underlying causes and adjust management of respiratory failure as follows-     -- Extubated on 7/16  -- Supplemental oxygen needs continue to improve: Currently on room air with an O2 Sat of 92%  --Wean supplemental oxygen while maintaining an O2 Sat range of 88-92%

## 2024-07-21 NOTE — ASSESSMENT & PLAN NOTE
AFib with RVR appears new following cardiac arrest at OSH. Per chart review, AFib was difficult to control. Patient endorses amiodarone reaction with itching, rash, and oral burning. Refused Sotalol, DCCV, and AICD placement at OSH. Was placed on Diltizem infusion and transitioned to PO metoprolol. Amio gtt restarted overnight 7/11 s/t uncontrolled afib rvr. Transitioned to PO amio.    - Patient's rate remained elevated despite amiodarone.  Appeared to respond better to metoprolol. Starting oral metoprolol 12.5mg bid.  - Apixaban on hold.  Anticoagulation with a heparin infusion.  - Daily CMP  - Keep K>4, Mag >2

## 2024-07-22 PROBLEM — J96.01 ACUTE RESPIRATORY FAILURE WITH HYPOXIA: Status: RESOLVED | Noted: 2024-06-18 | Resolved: 2024-07-22

## 2024-07-22 PROBLEM — A49.8 CLOSTRIDIUM DIFFICILE INFECTION: Status: ACTIVE | Noted: 2024-07-22

## 2024-07-22 NOTE — ASSESSMENT & PLAN NOTE
--Takes carvedilol + clonidine at home.   --Hold antihypertensives due to hypotension.   --Continue metoprolol for rate control.

## 2024-07-22 NOTE — PROGRESS NOTES
[facility] Intensive Care  Nephrology  Progress Note    Patient Name: Juhi Taylor  MRN: [id number]  Admission Date: 7/3/2024  Hospital Length of Stay: 18 days  Attending Provider: Rochelle Cuellar MD   Primary Care Physician: Tony Sadler MD  Principal Problem:ESRD (end stage renal disease)    Subjective:     HPI: 74-year-old female with prior history of CKD stage 5 with recent PD catheter placement on 6/4, RCC s/p right nephrectomy, T2DM, obesity, CAD s/p PCI to Lcx and LAD in 11/2020 and HFmREF who is admitted as a transfer from [facility] for higher level of care and further cardiac evaluation. Nephrology consulted for hemodialysis.     She initially presented on 6/18/24 to [facility] for hypervolemia in the setting of CKD 4-5 for which she had undergone elective PD catheter placement complicated by catheter site leaking. She had a complex hospital course, was initially placed on furosemide gtt without significant improvement and later underwent placement of tunneled line for HD with volume removal. During her second dialysis session she went into PEA arrest for which she was resuscitated, intubated and transferred to ICU. She was noted to have elevated troponin and subsequent TTE found reduced EF from 40-45% to 20-25% for which coronary angiography was recommended. Her hospital course was also complicated by dialysis line infection, significant bleeding around the catheter site, bradycardia and subsequent atrial fibrillation with RVR. Given the catheter site bleeding, anticoagulation was temporarily held. She has reported allergy to amiodarone, was initially placed on diltiazem for atrial fibrillation despite reduced EF. She discussed GOC at outside hospital where it appears that she lacked capacity per note and decided to pursue higher level of care at [facility]. At that time, gen surgery was planning to place a left tunneled catheter due to concerns of the right side not being suitable    Upon admit,  patient appeared drowsy but conversant. Daughter reports that she typically gets lethargic 2/2 to the volume overload due to not receiving dialysis. Patient reported b/l left lower extremity pain. Daughter concerned about her not having dialysis in 4 days and wants a session today.     Interval History:        The patient was able to finish 12 hours of SLED after increasing the hourly Heparin drip to 500 IU/hour. Today hemodynamically she is stable. Her Metabolic and renal profiles are stable.  She is having watery diarrhea. Her WBC is 25 K/uL.      Will not do dialysis today and reassess her  clinical condition  within 24 hours.      With this Leukocytosis we will not be able to move and change her Cuffed tunneled HD cath.         Will plan renal replacement therapy tomorrow.       Review of patient's allergies indicates:   Allergen Reactions    Percocet [oxycodone-acetaminophen] Itching    Januvia [sitagliptin]     Jardiance [empagliflozin]      Leg cramps    Lipitor [atorvastatin] Other (See Comments)     Severe leg pain    Linaclotide Other (See Comments) and Nausea And Vomiting     Does not remember    Lubiprostone Other (See Comments) and Palpitations     Does not remember     Current Facility-Administered Medications   Medication Frequency    0.9%  NaCl infusion (for blood administration) Q24H PRN    acetaminophen tablet 325 mg Q6H PRN    dextromethorphan-guaiFENesin  mg/5 ml liquid 5 mL Q4H PRN    dextrose 10% bolus 125 mL 125 mL PRN    dextrose 10% bolus 250 mL 250 mL PRN    diphenhydrAMINE capsule 25 mg Q6H PRN    glucagon (human recombinant) injection 1 mg PRN    glucose chewable tablet 16 g PRN    glucose chewable tablet 24 g PRN    heparin 25,000 units in dextrose 5% (100 units/ml) IV bolus from bag LOW INTENSITY nomogram - [facility] PRN    heparin 25,000 units in dextrose 5% (100 units/ml) IV bolus from bag LOW INTENSITY nomogram - [facility] PRN    heparin 25,000 units in dextrose 5% 250 mL (100 units/mL)  infusion LOW INTENSITY nomogram - [facility] Continuous    HYDROcodone-acetaminophen 5-325 mg per tablet 1 tablet Q8H PRN    LIDOcaine 5 % patch 2 patch Q24H    metoprolol injection 5 mg Q6H PRN    metoprolol tartrate (LOPRESSOR) split tablet 12.5 mg BID    miconazole NITRATE 2 % top powder BID    midodrine tablet 10 mg Daily PRN    midodrine tablet 10 mg TID    naloxone 0.4 mg/mL injection 0.02 mg PRN    ondansetron injection 4 mg Q6H PRN    simethicone chewable tablet 80 mg TID PRN    sodium chloride 0.9% flush 10 mL PRN    sodium chloride 0.9% flush 10 mL PRN       Objective:     Vital Signs (Most Recent):  Temp: 98.1 °F (36.7 °C) (07/21/24 1900)  Pulse: 105 (07/21/24 1945)  Resp: (!) 22 (07/21/24 1945)  BP: 111/67 (07/21/24 1930)  SpO2: (!) 93 % (07/21/24 1945) Vital Signs (24h Range):  Temp:  [98.1 °F (36.7 °C)-98.4 °F (36.9 °C)] 98.1 °F (36.7 °C)  Pulse:  [] 105  Resp:  [17-45] 22  SpO2:  [80 %-96 %] 93 %  BP: ()/(49-89) 111/67     Weight: 128.5 kg (283 lb 4.7 oz) (07/17/24 1209)  Body mass index is 44.37 kg/m².  Body surface area is 2.46 meters squared.    I/O last 3 completed shifts:  In: 3498.6 [I.V.:3445.6; IV Piggyback:53]  Out: 3556 [Other:3556]     Vitals:    07/21/24 1900 07/21/24 1915 07/21/24 1930 07/21/24 1945   BP: (!) 85/54  111/67    BP Location: Right forearm      Patient Position: Lying      Pulse: 105 108 105 105   Resp: 19 (!) 22 (!) 22 (!) 22   Temp: 98.1 °F (36.7 °C)      TempSrc: Oral      SpO2: (!) 92% (!) 92% (!) 93% (!) 93%   Weight:       Height:          Physical Exam  HENT:      Head: Normocephalic.      Nose: Nose normal.   Cardiovascular:      Rate and Rhythm: Normal rate.      Pulses: Normal pulses.   Pulmonary:      Effort: Pulmonary effort is normal.   Musculoskeletal:      Cervical back: Normal range of motion.      Right lower leg: Edema present.      Left lower leg: Edema present.   Neurological:      Mental Status: She is alert.          Significant Labs:  BMP:   Recent  Labs   Lab 07/21/24  1410   *      K 4.9   *   CO2 24   BUN 16   CREATININE 2.1*   CALCIUM 7.8*   MG 1.9     CBC:   Recent Labs   Lab 07/21/24  1308   WBC 24.23*   RBC 3.39*   HGB 8.5*   HCT 27.5*      MCV 81*   MCH 25.1*   MCHC 30.9*     CMP:   Recent Labs   Lab 07/21/24  0306 07/21/24  1410   * 151*   CALCIUM 7.6* 7.8*   ALBUMIN 1.3* 1.3*   PROT 4.2*  --     139   K 4.7 4.9   CO2 24 24    111*   BUN 10 16   CREATININE 1.4 2.1*   ALKPHOS 169*  --    ALT <5*  --    AST 22  --    BILITOT 0.9  --      LFTs:   Recent Labs   Lab 07/21/24  0306 07/21/24  1410   ALT <5*  --    AST 22  --    ALKPHOS 169*  --    BILITOT 0.9  --    PROT 4.2*  --    ALBUMIN 1.3* 1.3*     All labs within the past 24 hours have been reviewed.     Impression and plan:    Acute Resp. Failure with Hypoxemia.    ESRD shifted from PD to HD with Lt IJ cuffed tunneled Catheter. She had flow issues in the catheter.   Diarrhia 5 time will need to R/O C Dif.   Rt. Nephrectomy for RCC  HTN with labile BP.   DM with stable sugar.   A fib.  With rapid rate.   PAD  CHF with Ischemic cardiomyopathy.   CAD post cardiac arrest 6/25/2024  NICOLE  Morbid obesity BMI 44  Gout    PENNY MOLINA.Alexandro. MD. BRANDONN. FACP.  , [employer] / [employer] [place]  Nephrology Consultant. [facility].   [address]. 5th [place] [address].    email: [email or web address].  Tel: Office: [phone]

## 2024-07-22 NOTE — PT/OT/SLP PROGRESS
Occupational Therapy   Co-Treatment    Co-treatment performed due to patient's multiple deficits requiring two skilled therapists to appropriately and safely assess patient's strength and endurance while facilitating functional tasks in addition to accommodating for patient's activity tolerance.     Name: Juhi Taylor  MRN: [id number]  Admitting Diagnosis:  ESRD (end stage renal disease)  7 Days Post-Op    Recommendations:     Discharge Recommendations: Moderate Intensity Therapy  Discharge Equipment Recommendations:  bedside commode, wheelchair, walker, rolling, hospital bed  Barriers to discharge:  Other (Comment) (increased level (A) required))    Assessment:     Juhi Taylor is a 74 y.o. female with a medical diagnosis of ESRD (end stage renal disease). Pt alert and cooperative during session w/ VSS. Pt limited due to increased weakness, however demo'ed improvement by performing EOB balance w/ SBA and STS x2 w/ increased assistance. Pt is significantly below PLOF and would benefit from continued skilled therapy to improve the following \performance deficits affecting function: weakness, impaired endurance, impaired self care skills, impaired functional mobility, gait instability, impaired balance, pain, decreased safety awareness, decreased lower extremity function, decreased upper extremity function.     Rehab Prognosis:  Good; patient would benefit from acute skilled OT services to address these deficits and reach maximum level of function.       Plan:     Patient to be seen 4 x/week to address the above listed problems via self-care/home management, therapeutic activities, therapeutic exercises, neuromuscular re-education  Plan of Care Expires: 08/16/24  Plan of Care Reviewed with: patient, sibling    Subjective     Chief Complaint: itchy  Patient/Family Comments/goals: patient wants to wash hair  Pain/Comfort:  Pain Rating 1: other (see comments) (unrated)  Pain Addressed 1: Reposition,  Distraction    Objective:     Communicated with: NSG prior to session.  Patient found supine with telemetry, pulse ox (continuous), peripheral IV, blood pressure cuff upon OT entry to room.    General Precautions: Standard, fall, contact    Orthopedic Precautions:N/A  Braces: N/A  Respiratory Status: Room air     Occupational Performance:     Bed Mobility:    Patient completed Rolling/Turning to Left with  maximal assistance w/ side rail  Patient completed Rolling/Turning to Right with maximal assistance w/ side rail  Patient completed Scooting/Bridging with stand by assistance  Patient completed Supine to Sit with maximal assistance and 2 persons  Patient completed Sit to Supine with maximal assistance and 2 persons     Functional Mobility/Transfers:   Sit <> Stand Transfer from EOB with 75% clearance with  Max x2    Sit <> Stand Transfer from EOB with 100% clearnace w/ Max x2    Activities of Daily Living:  Grooming: pt seated EOB performed facial care w/ set up (A) & hair and body care w/ total A   Upper Body Dressing: doffed & donned new front gown w/ Max A due to line management seated EOB  Lower Body Dressing: donned B socks w/ total A seated EOB      AMPAC 6 Click ADL: 10    Treatment & Education:  Role of OT and POC   ADL retraining  STS x 2 to increase strength and endurance to perform ADLs   Importance of OOB/EOB activity   EOB sitting balance  Bed mobility     Patient left supine with all lines intact, call button in reach, family present, and all needs met    GOALS:   Multidisciplinary Problems       Occupational Therapy Goals          Problem: Occupational Therapy    Goal Priority Disciplines Outcome Interventions   Occupational Therapy Goal     OT, PT/OT Progressing    Description: Goals to be met by: 08/04/24     Patient will increase functional independence with ADLs by performing:    UE Dressing with Modified [place].  LE Dressing with Minimal Assistance.  Grooming while standing at sink with  Stand-by Assistance.  Toileting from bedside commode with Minimal Assistance for hygiene and clothing management.   Toilet transfer to bedside commode with Supervision.    DME:  Tub transfer bench is required for pt to return to t/s use with shower chair at present unsuitable with need for mobiltiy greater than pt can safely complete at present.     Patient has a mobility limitation that significantly impairs their ability to participate in one or more mobility related activities of daily living, including toileting. This deficit can be resolved by using a bedside commode. Patient demonstrates mobility limitations that will cause them to be confined to one room at home without bathroom access for up to 30 days. Using a bedside commode will greatly improve the patient's ability to participate in MRADLs.     Ambulation needs TBD on progress.                              Time Tracking:     OT Date of Treatment: 07/22/24  OT Start Time: 1325  OT Stop Time: 1357  OT Total Time (min): 32 min    Billable Minutes:Self Care/Home Management 32          Number of DAYSI visits since last OT visit: 1    7/22/2024

## 2024-07-22 NOTE — PT/OT/SLP PROGRESS
Physical Therapy Co-Treatment    Patient Name:  Juhi Taylor   MRN:  [id number]  Admitting Diagnosis:  ESRD (end stage renal disease)   Recent Surgery: Procedure(s) (LRB):  REMOVAL, CATHETER, DIALYSIS, PERITONEAL (N/A)  BRONCHOSCOPY, FLEXIBLE (N/A) 7 Days Post-Op  Admit Date: 7/3/2024  Length of Stay: 19 days    Recommendations:     Discharge Recommendations:   Moderate Intensity Therapy    Discharge Equipment Recommendations: bedside commode, wheelchair, walker, [place], hospital bed   Barriers to discharge: Decreased caregiver support and increased need for skilled assistance      Plan:     During this hospitalization, patient to be seen 3 x/week to address the identified rehab impairments via gait training, therapeutic activities, neuromuscular re-education, therapeutic exercises and progress towards the established goals.  Plan of Care Expires:  08/15/24  Plan of Care Reviewed with: patient, sibling    Assessment:     Juhi Taylor is a 74 y.o. female admitted with a medical diagnosis of ESRD (end stage renal disease). Pt found supine agreeable and motivated for therapy session. Pt demo'd improvements as she required decreased assistance with sitting balance this date and increased time spent sitting EOB. Pt continues to require max A x2 persons for bed mobility and sit to stand transfers at this time. Pt remains limited by generalized weakness and debility from extended hospitalization. Patient would benefit from skilled therapy services to maximize safety and independence, increase activity tolerance, decrease fall risk, decrease caregiver burden, improve QOL, improve patient's functional mobility, and decrease risk of contractures and pressure sores. Patient continues to demonstrate the need for moderate intensity therapy on a daily basis post acute exhibited by decreased independence with functional mobility     Problem List: weakness, impaired endurance, impaired self care skills, impaired  functional mobility, gait instability, impaired balance, decreased lower extremity function, decreased upper extremity function.  Rehab Prognosis: Good; patient would benefit from acute skilled PT services to address these deficits and reach maximum level of function.      Goals:   Multidisciplinary Problems       Physical Therapy Goals          Problem: Physical Therapy    Goal Priority Disciplines Outcome Goal Variances Interventions   Physical Therapy Goal     PT, PT/OT Progressing     Description: PT goals to be met by: 8/15/24    Patient will perform rolling each way with minimum assistance  Patient will perform supine <> sitting with minimum assistance  Patient will perform sit <> stand transitions with maximal assistance  Patient will perform transfers from bed <> chair or BSC with maximal assistance using LRAD  Patient will perform standing for 60 seconds with maximal assistance using RW or LRAD    DME Justifications (see above for complete DME recommendations)    Bedside Commode- Patient has a mobility limitation that significantly impairs their ability to participate in one or more mobility related activities of daily living, including toileting. This deficit can be resolved by using a bedside commode. Patient demonstrates mobility limitations that will cause them to be confined to one room at home without bathroom access for up to 30 days. Using a bedside commode will greatly improve the patient's ability to participate in MRADLs.     Rolling Walker- Patient demonstrates a mobility limitation that significantly impairs their ability to participate in one or more mobility related activities of daily living. Patient's mobility limitation cannot be sufficiently resolved with the use of a cane, but can be sufficiently resolved with the use of a rolling walker.The use of a rolling walker will considerably improve their ability to participate in MRADLs. Patient will use the walker on a regular basis at home.   "    Wheelchair-  Patient has a mobility limitation that significantly impairs their ability to participate in one or more mobility related activities of daily living in customary locations in the home. The mobility limitation cannot be sufficiently resolved by the use of a cane or walker. The use of a manual wheelchair will greatly improve the patient's ability to participate in MRADLs. The patient will use the wheelchair on a regular basis at home. They have expressed their willingness to use a manual wheelchair in the home, and have a caregiver who is available and willing to assist with the wheelchair if needed.     Hospital Bed ·       Patient requires a hospital bed for positioning of the body in ways that are not feasible with an ordinary bed. The patient requires special positioning for pain relief, limited mobility, and/or being unable to independently make changes in body position without the use of a hospital bed. Pillows and wedges will not be adequate for resolving these positional issues.                         Subjective     RN notified prior to session. Sister present upon PT entrance into room. Patient agreeable to PT treatment session.    Chief Complaint: "it feels so good to sit up"  Patient/Family Comments/goals: stand, walk  Pain/Comfort:  Pain Rating 1: 0/10  Pain Rating Post-Intervention 1: 0/10      Objective:     Additional staff present: OT; OT for cotx due to pt's multiple medical comorbidities and functional deficits req'ing two skilled therapists to appropriately maximize functional potential by progressing pt's musculoskeletal strength and endurance, facilitating neuromuscular postural balance and control ,and accommodating for patient's impaired cardiopulmonary tolerance to activities.     Patient found supine with: blood pressure cuff, telemetry, pulse ox (continuous), Trialysis   Cognition:   Alert and Cooperative  Patient is oriented to Person, Place, Time, Situation  General " "Precautions: Standard, Cardiac fall, contact   Orthopedic Precautions:N/A   Braces: N/A   Body mass index is 44.37 kg/m².  Oxygen Device: Room Air  Vitals: BP 93/62 (BP Location: Right forearm, Patient Position: Lying)   Pulse 100   Temp 97 °F (36.1 °C) (Oral)   Resp (!) 22   Ht 5' 7" (1.702 m)   Wt 128.5 kg (283 lb 4.7 oz)   SpO2 96%   Breastfeeding No   BMI 44.37 kg/m²     Outcome Measures:  AM-PAC 6 CLICK MOBILITY  Turning over in bed (including adjusting bedclothes, sheets and blankets)?: 2  Sitting down on and standing up from a chair with arms (e.g., wheelchair, bedside commode, etc.): 2  Moving from lying on back to sitting on the side of the bed?: 2  Moving to and from a bed to a chair (including a wheelchair)?: 1  Need to walk in hospital room?: 1  Climbing 3-5 steps with a railing?: 1  Basic Mobility Total Score: 9     Functional Mobility:    Bed Mobility:   Scooting with stand by assistance  Supine > Sit with maximal assistance and 2 persons  Sit > Supine with maximal assistance and 2 persons    Transfers:   Sit <> Stand Transfer: maximal assistance and of 2 persons with no assistive device   1st stand: pt reached ~75% full stand <10 seconds  2nd stand: pt reached full upright stance <10 seconds  Pt required max tactile/verbal cueing for glut extension to reach full stand. Pt with increased B knee flexion during stand attempts.   Bed <> Chair Transfer: Not performed 2/2 pt safety    Balance:  Sitting Balance  Static: stand by assistance  Dynamic: stand by assistance  Time: 15 minutes  Pt worked on activity tolerance and sitting balance while completing ADLs while sitting EOB. Pt with VSS throughout and no LOB  Standing:  Static: maximal assistance and of 2 persons  Dynamic:  n/a    Therapeutic exercise performed in the form of bed mobility, sitting balance, and transfers to increase patient's activity tolerance and postural control.     Education:  Time provided for education, counseling and " discussion of health disposition in regards to patient's current status  All questions answered within PT scope of practice and to patient's satisfaction  PT role in POC to address current functional deficits    Patient left HOB elevated with all lines intact, call button in reach, RN  notified, and family present.    Time Tracking:     PT Received On: 07/22/24  PT Start Time: 1325     PT Stop Time: 1356  PT Total Time (min): 31 min     Billable Minutes:   Therapeutic Activity 16 minutes and Therapeutic Exercise 15 minutes       PT/PTA: PT       7/22/2024

## 2024-07-22 NOTE — PLAN OF CARE
[facility]  Discharge Reassessment    Primary Care Provider: Tony Sadler MD    Expected Discharge Date: 7/22/2024    Reassessment (most recent)       Discharge Reassessment - 07/22/24 1111          Discharge Reassessment    Assessment Type Discharge Planning Reassessment     Did the patient's condition or plan change since previous assessment? Yes     Discharge Plan discussed with: Patient;Adult children     Communicated HARINI with patient/caregiver Date not available/Unable to determine     Discharge Plan A Home with family     Discharge Plan B Home with family;Home     DME Needed Upon Discharge  none     Why the patient remains in the hospital Requires continued medical care        Post-Acute Status    Discharge Delays None known at this time                   Patient remains in SICU

## 2024-07-22 NOTE — PROGRESS NOTES
Pt seen for wound care consult/follow up. Pt had been seen previously in this admission by wound care team. Pt AAOx3, visitor at bedside. Primary nurse in room and stated that pt was recently cleaned and zinc barrier cream applied to sacral /buttock area as well as abdominal folds. Pt in agreement to wound care assessment. Pt is on a waffle overlay and has heel lift boots but does not want to wear them. Heels are clear at this time and heel silicone dressings are in place. Suggested pt allow for pillows or a rolled up towel to be positioned so that heels are floating. Pt and visitor in agreement. Also discussed offloading sacral area with a pillow. Elbow wounds have healed; dressing to leg elbow area for protection. Pt's wounds to left upper chest/clavicle area are dried, scabbed, with no s/s infection. She does have some bruising around the port site and previous access site. Triad may be applied to these wounds but as they are so close to the HD access, suggest to leave open to air and monitor. Pt in agreement. Would recommend continuing with Triad wound cream or Zinc barrier cream, BID and prn, to sacral area and utilize written order guidelines for skin tears.    Recommend:   Triad or zinc barrier cream to buttocks/sacral area  Please follow Skin tear protocol for all skin tears; may leave right clavicle scabs open to air  Offload heels while in bed  Triad may be applied to abdominal folds, BID and prn      Amy Aranda RN, ON  [facility] Wound/Ostomy  7/22/24 07/22/24 1200        Wound 06/23/24 1036 Incision Left Chest   Date First Assessed/Time First Assessed: 06/23/24 1036   Present on Original Admission: No  Primary Wound Type: Incision  Side: Left  Location: Chest  Closure Method: Dermabond   Wound Image    Dressing Appearance Open to air   Drainage Amount None   Drainage Characteristics/Odor No odor   Appearance Tan;Maroon;Eschar;Dry;Epithelialization   Periwound Area Dry;Other (see  comments)  (other smaller wounds/incision sites which have scabbed over)   Wound Edges Rolled/closed  (closed)        Wound 06/27/24 0701 Abrasion(s) Right Buttocks   Date First Assessed/Time First Assessed: 06/27/24 0701   Primary Wound Type: Abrasion(s)  Side: Right  Location: Buttocks   Dressing Appearance other (see comments)  (zinc barrier cream just applied)        Wound 07/20/24 0204 Abrasion(s) Left Elbow   Date First Assessed/Time First Assessed: 07/20/24 0204   Present on Original Admission: No  Primary Wound Type: Abrasion(s)  Side: Left  Location: Elbow  Is this injury device related?: No   Wound Image    Dressing Appearance Open to air   Drainage Amount None   Drainage Characteristics/Odor No odor   Appearance Pink;Smooth;Dry;Epithelialization;Closed/resurfaced  (closed)   Periwound Area Intact;Dry   Wound Edges Rolled/closed  (closed)   Dressing Foam;Silicone  (bandage for protection)        Wound 07/20/24 0207 Abrasion(s) Right Elbow   Date First Assessed/Time First Assessed: 07/20/24 0207   Present on Original Admission: No  Primary Wound Type: Abrasion(s)  Side: Right  Location: Elbow   Wound Image    Dressing Appearance Dry;Intact;Clean   Drainage Amount None   Drainage Characteristics/Odor No odor   Appearance Tan;Pink;Dry;Epithelialization;Closed/resurfaced   Periwound Area Intact;Dry   Wound Edges Rolled/closed  (closed)

## 2024-07-22 NOTE — PLAN OF CARE
Pt progressing towards goals, continue current POC  Problem: Physical Therapy  Goal: Physical Therapy Goal  Description: PT goals to be met by: 8/15/24    Patient will perform rolling each way with minimum assistance  Patient will perform supine <> sitting with minimum assistance  Patient will perform sit <> stand transitions with maximal assistance  Patient will perform transfers from bed <> chair or BSC with maximal assistance using LRAD  Patient will perform standing for 60 seconds with maximal assistance using RW or LRAD    DME Justifications (see above for complete DME recommendations)    Bedside Commode- Patient has a mobility limitation that significantly impairs their ability to participate in one or more mobility related activities of daily living, including toileting. This deficit can be resolved by using a bedside commode. Patient demonstrates mobility limitations that will cause them to be confined to one room at home without bathroom access for up to 30 days. Using a bedside commode will greatly improve the patient's ability to participate in MRADLs.     Rolling Walker- Patient demonstrates a mobility limitation that significantly impairs their ability to participate in one or more mobility related activities of daily living. Patient's mobility limitation cannot be sufficiently resolved with the use of a cane, but can be sufficiently resolved with the use of a rolling walker.The use of a rolling walker will considerably improve their ability to participate in MRADLs. Patient will use the walker on a regular basis at home.      Wheelchair-  Patient has a mobility limitation that significantly impairs their ability to participate in one or more mobility related activities of daily living in customary locations in the home. The mobility limitation cannot be sufficiently resolved by the use of a cane or walker. The use of a manual wheelchair will greatly improve the patient's ability to participate in  MRADLs. The patient will use the wheelchair on a regular basis at home. They have expressed their willingness to use a manual wheelchair in the home, and have a caregiver who is available and willing to assist with the wheelchair if needed.     Hospital Bed ·       Patient requires a hospital bed for positioning of the body in ways that are not feasible with an ordinary bed. The patient requires special positioning for pain relief, limited mobility, and/or being unable to independently make changes in body position without the use of a hospital bed. Pillows and wedges will not be adequate for resolving these positional issues.    Outcome: Progressing

## 2024-07-22 NOTE — PLAN OF CARE
SICU PLAN OF CARE    Dx: ESRD (end stage renal disease)    Goals of Care: Work with PT    Vital Signs (last 12 hours):   Temp:  [98.7 °F (37.1 °C)]   Pulse:  []   Resp:  [16-40]   BP: ()/(50-98)   SpO2:  [89 %-95 %]      Neuro: AAOx4, Follows commands , and Moves all extremities spontaneously     Cardiac: A-fib rate 80-100s    Respiratory: Room Air    Gtts: Heparin    Urine Output: Oliguric  one occurrence overnight    Dialysis: no orders for CRRT overnight    Diet: Renal     Labs/Accuchecks: q8 RFP Mag, daily labs    Skin:  Wounds charted in Epic, wound care performed q shift and PRN.  Patient turned q2h, bony prominences protected, and mattress inflated/working correctly.   Jus Score: 13. If Jus Score is 16 or less, complete 4EYES note each shift.    Shift Events:  Pt had difficulty sleeping and feels exhausted from being in the hospital so long. Pt desires to work with PT and get out of bed. Pain meds and simethicone given PRN for pain and gas discomfort. Multiple loose bowel movements overnight.  See flowsheet for further assessment/details.  Family updated on current condition/plan of care, questions answered, and emotional support provided.  MD updated on current condition, vitals, labs, and gtts.      Nurses Note -- 4 Eyes  7/22/2024   7:25 AM      Skin assessed during: Q Shift      [] No Altered Skin Integrity Present    []Prevention Measures Documented      [x] Yes- Altered Skin Integrity Present or Discovered   [] LDA Added if Not in Epic (Describe Wound)   [] New Altered Skin Integrity was Present on Admit and Documented in LDA   [] Wound Image Taken    Wound Care Consulted? following    Attending Nurse:  Kassandra JANE RN    Second RN/Staff Member:  Jennifer WRIGHT RN

## 2024-07-22 NOTE — ASSESSMENT & PLAN NOTE
ESRD - was briefly on PD then HD, having issues w tunnel cath and now w trialysis in place in [facility]. S/p R nephrectomy RCC  C diff positive - diarrhea all weekend  HTN  DM  Afib w rvr  CHF w ischemic cardiomyopathy  S/p cardiac arrest     Plan/Recommendations  -SLED today for 6 hours, will need heparin drip into pre filter fluid 500 cc/hr for total of 3000 units over 6 hrs  - Edema 2-3+. Hypotension noted overnight, HD is unfortunately still contraindicated.   - Planning for replacement of tunnel cath w interventional nephrology, however given elevated WBC (likely from c diff) would hold off on this for now.   -FU on protein c and s labs  - Tunnel cath placed 7/11 - arterial port suctioning on vessel wall.  - Acute resp failure - on supplemental O2.  - Renally adjust medications  - Pre and Post-HD weights   - Continue to monitor intake and output

## 2024-07-22 NOTE — ASSESSMENT & PLAN NOTE
--AFib with RVR appears new following cardiac arrest at OSH.   --Patient endorses amiodarone reaction with itching, rash, and oral burning. Refused Sotalol, DCCV, and AICD placement at OSH.   --Was placed on Diltizem infusion and transitioned to PO metoprolol. Amio gtt restarted overnight 7/11 s/t uncontrolled afib with RVR. Transitioned to PO amio.  --Patient's rate remained elevated despite amiodarone. Appeared to respond better to metoprolol.   --Continue oral metoprolol 12.5mg bid.  --Apixaban on hold. Anticoagulation with a heparin infusion.

## 2024-07-22 NOTE — PROGRESS NOTES
[facility]  Nephrology  Progress Note    Patient Name: Juhi Taylor  MRN: [id number]  Admission Date: 7/3/2024  Hospital Length of Stay: 19 days  Attending Provider: Milind Monte MD   Primary Care Physician: Tony Sadler MD  Principal Problem:ESRD (end stage renal disease)    Subjective:     HPI: 74-year-old female with prior history of CKD stage 5 with recent PD catheter placement on 6/4, RCC s/p right nephrectomy, T2DM, obesity, CAD s/p PCI to Lcx and LAD in 11/2020 and HFmREF who is admitted as a transfer from [facility] for higher level of care and further cardiac evaluation. Nephrology consulted for hemodialysis.     She initially presented on 6/18/24 to [facility] for hypervolemia in the setting of CKD 4-5 for which she had undergone elective PD catheter placement complicated by catheter site leaking. She had a complex hospital course, was initially placed on furosemide gtt without significant improvement and later underwent placement of tunneled line for HD with volume removal. During her second dialysis session she went into PEA arrest for which she was resuscitated, intubated and transferred to ICU. She was noted to have elevated troponin and subsequent TTE found reduced EF from 40-45% to 20-25% for which coronary angiography was recommended. Her hospital course was also complicated by dialysis line infection, significant bleeding around the catheter site, bradycardia and subsequent atrial fibrillation with RVR. Given the catheter site bleeding, anticoagulation was temporarily held. She has reported allergy to amiodarone, was initially placed on diltiazem for atrial fibrillation despite reduced EF. She discussed GOC at outside hospital where it appears that she lacked capacity per note and decided to pursue higher level of care at [facility]. At that time, gen surgery was planning to place a left tunneled catheter due to concerns of the right side not being suitable    Upon admit,  patient appeared drowsy but conversant. Daughter reports that she typically gets lethargic 2/2 to the volume overload due to not receiving dialysis. Patient reported b/l left lower extremity pain. Daughter concerned about her not having dialysis in 4 days and wants a session today.     Interval History: Juhi is laying in bed this am, wearing nasal cannula, family at bedside. She had a rough weekend with lots of diarrhea, turns out she is c diff positive.     Review of patient's allergies indicates:   Allergen Reactions    Percocet [oxycodone-acetaminophen] Itching    Januvia [sitagliptin]     Jardiance [empagliflozin]      Leg cramps    Lipitor [atorvastatin] Other (See Comments)     Severe leg pain    Linaclotide Other (See Comments) and Nausea And Vomiting     Does not remember    Lubiprostone Other (See Comments) and Palpitations     Does not remember     Current Facility-Administered Medications   Medication Frequency    0.9%  NaCl infusion (CRRT USE ONLY) Continuous    0.9%  NaCl infusion (for blood administration) Q24H PRN    acetaminophen tablet 325 mg Q6H PRN    dextromethorphan-guaiFENesin  mg/5 ml liquid 5 mL Q4H PRN    dextrose 10% bolus 125 mL 125 mL PRN    dextrose 10% bolus 250 mL 250 mL PRN    diphenhydrAMINE capsule 25 mg Q6H PRN    glucagon (human recombinant) injection 1 mg PRN    glucose chewable tablet 16 g PRN    glucose chewable tablet 24 g PRN    heparin (porcine) injection Once    heparin 25,000 units in dextrose 5% (100 units/ml) IV bolus from bag LOW INTENSITY nomogram - [facility] PRN    heparin 25,000 units in dextrose 5% (100 units/ml) IV bolus from bag LOW INTENSITY nomogram - [facility] PRN    heparin 25,000 units in dextrose 5% 250 mL (100 units/mL) infusion LOW INTENSITY nomogram - [facility] Continuous    HYDROcodone-acetaminophen 5-325 mg per tablet 1 tablet Q8H PRN    LIDOcaine 5 % patch 2 patch Q24H    magnesium sulfate 2g in water 50mL IVPB (premix) PRN    metoprolol injection 5 mg Q6H PRN     metoprolol tartrate (LOPRESSOR) split tablet 12.5 mg BID    miconazole NITRATE 2 % top powder BID    midodrine tablet 10 mg Daily PRN    midodrine tablet 10 mg TID    naloxone 0.4 mg/mL injection 0.02 mg PRN    ondansetron injection 4 mg Q6H PRN    simethicone chewable tablet 80 mg TID PRN    sodium chloride 0.9% flush 10 mL PRN    sodium chloride 0.9% flush 10 mL PRN    sodium phosphate 20.01 mmol in D5W 250 mL IVPB PRN    sodium phosphate 30 mmol in D5W 250 mL IVPB PRN    sodium phosphate 39.99 mmol in D5W 250 mL IVPB PRN    vancomycin 125 mg/5 mL oral solution 125 mg Q6H       Objective:     Vital Signs (Most Recent):  Temp: 98 °F (36.7 °C) (07/22/24 1101)  Pulse: 97 (07/22/24 1101)  Resp: 20 (07/22/24 1101)  BP: 117/65 (07/22/24 1101)  SpO2: (!) 93 % (07/22/24 1101) Vital Signs (24h Range):  Temp:  [98 °F (36.7 °C)-98.7 °F (37.1 °C)] 98 °F (36.7 °C)  Pulse:  [] 97  Resp:  [16-45] 20  SpO2:  [89 %-96 %] 93 %  BP: ()/(50-98) 117/65     Weight: 128.5 kg (283 lb 4.7 oz) (07/17/24 1209)  Body mass index is 44.37 kg/m².  Body surface area is 2.46 meters squared.    I/O last 3 completed shifts:  In: 1604.8 [P.O.:120; I.V.:1431.8; IV Piggyback:53]  Out: 1545 [Other:1545]     Physical Exam  Constitutional:       General: She is not in acute distress.     Appearance: Normal appearance. She is obese.   HENT:      Head: Normocephalic and atraumatic.      Mouth/Throat:      Mouth: Mucous membranes are moist.      Pharynx: Oropharynx is clear.   Cardiovascular:      Rate and Rhythm: Normal rate and regular rhythm.      Heart sounds: Normal heart sounds.   Pulmonary:      Effort: Pulmonary effort is normal.      Breath sounds: Normal breath sounds.      Comments: Wearing nasal cannula  Abdominal:      Palpations: Abdomen is soft.   Musculoskeletal:      Right lower leg: Edema (2+) present.      Left lower leg: Edema (2+) present.      Comments: Small amount of weeping in lower extremities.    Skin:     General:  Skin is warm and dry.   Neurological:      General: No focal deficit present.      Mental Status: She is alert. She is disoriented.      Motor: Weakness present.          Significant Labs:  All labs within the past 24 hours have been reviewed.     Significant Imaging:  All imaging with the past 24 hours have been reviewed.  Assessment/Plan:     Renal/  * ESRD (end stage renal disease)  ESRD - was briefly on PD then HD, having issues w tunnel cath and now w trialysis in place in [facility]. S/p R nephrectomy RCC  C diff positive - diarrhea all weekend  HTN  DM  Afib w rvr  CHF w ischemic cardiomyopathy  S/p cardiac arrest     Plan/Recommendations  -SLED today for 6 hours, will need heparin drip into pre filter fluid 500 cc/hr for total of 3000 units over 6 hrs  - Edema 2-3+. Hypotension noted overnight, HD is unfortunately still contraindicated.   - Planning for replacement of tunnel cath w interventional nephrology, however given elevated WBC (likely from c diff) would hold off on this for now.   -FU on protein c and s labs  - Tunnel cath placed 7/11 - arterial port suctioning on vessel wall.  - Acute resp failure - on supplemental O2.  - Renally adjust medications  - Pre and Post-HD weights   - Continue to monitor intake and output         Thank you for your consult. I will follow-up with patient. Please contact us if you have any additional questions.    Paulo Alicea MD  Nephrology  [facility]

## 2024-07-22 NOTE — PROGRESS NOTES
[facility]  Critical Care Medicine  Progress Note    Patient Name: Juhi Taylor  MRN: [id number]  Admission Date: 7/3/2024  Hospital Length of Stay: 19 days  Code Status: DNR  Attending Provider: Milind Monte MD  Primary Care Provider: Tony Sadler MD   Principal Problem: ESRD (end stage renal disease)    Subjective:     HPI:  Juhi Taylor is a 74-year-old female with a past medical history of CKD stage 5 with recent PD catheter placement on 6/4, RCC s/p right nephrectomy, T2DM, obesity, CAD s/p PCI to Lcx and LAD in 11/2020 and HFmREF who is admitted as a transfer from [facility] for higher level of care and further cardiac evaluation. Nephrology consulted for hemodialysis.     She initially presented on 6/18/24 to [facility] for hypervolemia in the setting of CKD 4-5 for which she had undergone elective PD catheter placement complicated by catheter site leaking. She had a complex hospital course, was initially placed on furosemide gtt without significant improvement and later underwent placement of tunneled line for HD with volume removal. During her second dialysis session she went into PEA arrest for which she was resuscitated, intubated and transferred to ICU. She was noted to have elevated troponin and subsequent TTE found reduced EF from 40-45% to 20-25% for which coronary angiography was recommended. Her hospital course was also complicated by dialysis line infection, significant bleeding around the catheter site, bradycardia and subsequent atrial fibrillation with RVR. Given the catheter site bleeding, anticoagulation was temporarily held. She has reported allergy to amiodarone, was initially placed on diltiazem for atrial fibrillation despite reduced EF. She discussed GOC at outside hospital where it appears that she lacked capacity per note and decided to pursue higher level of care at [facility]. At that time, General Surgery was planning to place a left tunneled catheter  due to concerns of the right side not being suitable     Upon admit, patient appeared drowsy but conversant. Daughter reports that she typically gets lethargic 2/2 to the volume overload due to not receiving dialysis. Patient reported b/l left lower extremity pain. Critical Care Medicine was consulted for emergent trialysis line placement and dialysis.     Ms. Taylor was admitted to MICU on 7/8/24 for urgent dialysis line placement and initiation of dialysis. Patient received short run of CRRT overnight without issue. Encephalopathy improving with continued CRRT treatments. She has had persistent AFib with RVR for which received Digoxin loading dose (renally dosed) and was restarted PO carvedilol. Patient remains in persistent AFib RVR despite therapeutic digoxin level and uptitration of carvedilol dosages so she was started on an amiodarone infusion. She was then transitioned to amio PO and started on heparin infusion for atrial fibrillation. General Surgery removed her peritoneal dialysis catheter on 7/15/24, but upon induction she vomited copious amounts of bile and was endotracheally intubated. She was extubated to nasal cannula on 7/16/24.     She has had difficulties with her tunneled HD line with sluggish flow especially through the venous side.  She would have a 4-hour instillation of tPA in each line before being able to run iHD, and even then it would be stopped before completion.  Interventional Nephrology plans to replace the line at some point next week.  Of note, Palliative Care was also consulted and patient had made her wishes known that she is a DNR code status.  She was still vacillating between replacing her tunneled HD line given her adverse event during her last procedure or going home with hospice.  The family was upset with her decision and April, her niece, asked that Palliative Care not return to see the patient.      Hospital/ICU Course:  Ms. Taylor was admitted to MICU on 7/8 for  urgent dialysis line placement and initiated of HD. Patient received short run of CRRT overnight without issue. Encephalopathy improving with continued CRRT treatments. Persistent AFib with RVR, received Digoxin loading dose (renally dosed), restarted PO Carvedilol. Patient remains in persistent AFib RVR despite therapeutic digoxin level and uptitration of Carvedilol levels, started on Amiodarone infusion overnight. Transitioned to amio PO, started on heparin gtt for afib AC. Tolerated HD well overnight without complications and less encephalopathic upon exam. Planning for gen surg to remove PD catheter. Upon induction pt vomited copious amounts of bile, was intubated. PD cathter removed successfully. Extubated to NC 7/16.     7/18:  Patient made DNR after Palliative Care discussions yesterday.  Tolerated SLED 12 hours overnight.  Discussed difficulties regarding HD line with Interventional Nephrology and plans to replace it next week if patient it amenable.  Patient was stepped down from ICU.  7/19:  Stepped back up to medical ICU with plans for overnight SLED x 3 days.    On 7/19 Nephrology requested that the patient be stepped back up to the medical ICU for SLED. A temporary trialysis HD catheter was placed on 7/20/24 in the groin. She also began having diarrhea, which tested positive for Clostridium difficile so she was started on PO vancomycin 7/22/24.    Interval History/Significant Events: Positive for Clostridium difficile so started on PO vancomycin. Frequent diarrheal episodes resulting in anal irritation.    Review of Systems   Constitutional:  Negative for chills and diaphoresis.   HENT:  Negative for postnasal drip.    Eyes:  Negative for visual disturbance.   Respiratory:  Negative for shortness of breath.    Cardiovascular:  Negative for chest pain.   Gastrointestinal:  Positive for diarrhea. Negative for abdominal pain and constipation.   Genitourinary:  Negative for hematuria.   Musculoskeletal:   Negative for myalgias.   Skin:  Negative for rash.   Neurological:  Negative for headaches.   Hematological:  Negative for adenopathy.     Objective:     Vital Signs (Most Recent):  Temp: 98.7 °F (37.1 °C) (07/22/24 0300)  Pulse: 88 (07/22/24 0800)  Resp: 20 (07/22/24 0800)  BP: 130/60 (07/22/24 0920)  SpO2: (!) 94 % (07/22/24 0800) Vital Signs (24h Range):  Temp:  [98.1 °F (36.7 °C)-98.7 °F (37.1 °C)] 98.7 °F (37.1 °C)  Pulse:  [] 88  Resp:  [16-45] 20  SpO2:  [88 %-96 %] 94 %  BP: ()/(49-98) 130/60   Weight: 128.5 kg (283 lb 4.7 oz)  Body mass index is 44.37 kg/m².      Intake/Output Summary (Last 24 hours) at 7/22/2024 0942  Last data filed at 7/22/2024 0600  Gross per 24 hour   Intake 379.22 ml   Output --   Net 379.22 ml          Physical Exam  Vitals reviewed.   Constitutional:       Appearance: She is well-developed.   HENT:      Head: Normocephalic and atraumatic.      Nose: Nose normal.      Mouth/Throat:      Pharynx: Oropharynx is clear.   Eyes:      General: No scleral icterus.        Right eye: No discharge.         Left eye: No discharge.      Conjunctiva/sclera: Conjunctivae normal.      Pupils: Pupils are equal, round, and reactive to light.   Neck:      Thyroid: No thyromegaly.      Vascular: No JVD.      Trachea: Trachea normal. No tracheal deviation.   Cardiovascular:      Rate and Rhythm: Normal rate and regular rhythm.      Heart sounds: Normal heart sounds, S1 normal and S2 normal. No murmur heard.     No friction rub. No gallop.   Pulmonary:      Effort: Pulmonary effort is normal. No respiratory distress.      Breath sounds: Normal breath sounds. No wheezing or rales.   Chest:      Chest wall: No tenderness.   Abdominal:      General: Bowel sounds are normal. There is no distension.      Palpations: Abdomen is soft. There is no mass.      Tenderness: There is no abdominal tenderness. There is no guarding or rebound.   Musculoskeletal:         General: No tenderness or deformity.  Normal range of motion.      Cervical back: Full passive range of motion without pain, normal range of motion and neck supple.   Lymphadenopathy:      Cervical: No cervical adenopathy.   Skin:     General: Skin is warm and dry.      Findings: No abrasion or bruising.   Neurological:      Cranial Nerves: No cranial nerve deficit.      Coordination: Coordination normal.            Vents:  Vent Mode: Spont (07/16/24 1003)  Set Rate: 15 BPM (07/16/24 0903)  Vt Set: 420 mL (07/16/24 0903)  Pressure Support: 5 cmH20 (07/16/24 1003)  PEEP/CPAP: 5 cmH20 (07/16/24 1003)  Oxygen Concentration (%): 30 (07/16/24 1003)  Peak Airway Pressure: 10 cmH20 (07/16/24 1003)  Plateau Pressure: 0 cmH20 (07/16/24 1003)  Total Ve: 5.45 L/m (07/16/24 1003)  Negative Inspiratory Force (cm H2O): 0 (07/16/24 1003)  Lines/Drains/Airways       Central Venous Catheter Line  Duration                  Hemodialysis Catheter 07/11/24 1219 left subclavian 10 days    Trialysis (Dialysis) Catheter 07/20/24 1120 left femoral 1 day                  Significant Labs:    CBC/Anemia Profile:  Recent Labs   Lab 07/21/24  0306 07/21/24  1308 07/22/24  0510   WBC 25.02* 24.23* 23.88*   HGB 8.5* 8.5* 8.8*   HCT 25.6* 27.5* 28.5*    294 314   MCV 77* 81* 82   RDW 21.6* 22.2* 22.5*        Chemistries:  Recent Labs   Lab 07/21/24  0306 07/21/24  1410 07/21/24  2144 07/22/24  0510    139 140 139   K 4.7 4.9 4.7 4.7    111* 109 109   CO2 24 24 22* 23   BUN 10 16 20 21   CREATININE 1.4 2.1* 2.6* 2.6*   CALCIUM 7.6* 7.8* 8.0* 7.8*   ALBUMIN 1.3* 1.3* 1.3* 1.3*   PROT 4.2*  --   --  4.3*   BILITOT 0.9  --   --  0.9   ALKPHOS 169*  --   --  187*   ALT <5*  --   --  6*   AST 22  --   --  30   MG 1.9 1.9 2.0 2.0   PHOS 2.1* 2.9 3.2 3.4       None    Significant Imaging:  I have reviewed all pertinent imaging results/findings within the past 24 hours.    [facility]  Recent Labs   Lab 07/15/24  1557   PH 7.314*   PO2 92   PCO2 38.3   HCO3 19.5*   BE -7*      Assessment/Plan:     Cardiac/Vascular  Atrial fibrillation with RVR  --AFib with RVR appears new following cardiac arrest at OSH.   --Patient endorses amiodarone reaction with itching, rash, and oral burning. Refused Sotalol, DCCV, and AICD placement at OSH.   --Was placed on Diltizem infusion and transitioned to PO metoprolol. Amio gtt restarted overnight 7/11 s/t uncontrolled afib with RVR. Transitioned to PO amio.  --Patient's rate remained elevated despite amiodarone. Appeared to respond better to metoprolol.   --Continue oral metoprolol 12.5mg bid.  --Apixaban on hold. Anticoagulation with a heparin infusion.      Acute on chronic combined systolic and diastolic heart failure  Echo    Left Ventricle: The left ventricle is moderately dilated. Normal wall thickness. Global hypokinesis present. There is severely reduced systolic function with a visually estimated ejection fraction of 20 - 25%.    Right Ventricle: Mild right ventricular enlargement. Wall thickness is normal. Right ventricle wall motion has global hypokinesis. Systolic function is moderately reduced.    Left Atrium: Left atrium is severely dilated.    Right Atrium: Right atrium is severely dilated.    Aortic Valve: The aortic valve is a trileaflet valve. There is mild aortic valve sclerosis.    Mitral Valve: There is mild regurgitation.    Tricuspid Valve: There is severe regurgitation.    Pulmonary Artery: There is moderate pulmonary hypertension. The estimated pulmonary artery systolic pressure is 61 mmHg.    IVC/SVC: Elevated venous pressure at 15 mmHg.    Pericardium: There is a trivial circumferential effusion. No indication of cardiac tamponade.    - Will require interventional cardiology for LHC vs PET for reduced EF once euvolemic  - LifeVest prior to discharge  - Entresto recommended by cardiology, but currently on hold due to hypotension.    Essential hypertension  --Takes carvedilol + clonidine at home.   --Hold antihypertensives due  to hypotension.   --Continue metoprolol for rate control.       Renal/  * ESRD (end stage renal disease)  --PD catheter removed 07/15.   --Tunneled dialysis catheter was not functioning overnight.   --Trialysis catheter placed in left femoral on 7/20.  --Requested HD trial today.     ID  Clostridium difficile infection  --Diagnosed 7/22/24.  --PO vancomycin 125 mg q6h x 10 days. Stop date: 8/1/24.    Endocrine  Severe obesity (BMI >= 40)  --Morbid obesity complicating her medical care.    Type 2 diabetes mellitus with microalbuminuria, without long-term current use of insulin  --Latest A1C 6.8; takes metformin at home.   --Target -180. Within goal.   --Continue to monitor.     GI  Diarrhea  Etiology unclear.   Stool studies pending.   Low risk for c,diff as she has not had much in the way of antibiotics recently.  Because she is going to the OR on Tuesday, must r/o all infectious causes including c.diff.    Palliative Care  Palliative care encounter  - Palliative care following, continued GOC with family. Plan for family meeting today 07/17    ACP (advance care planning)  --Palliative care consulted, appreciate their assistance in clarification of GOC/POC with patient and family.    Other  Debility  --PT/OT.      I spent >35 minutes reviewing patient records, examining, and counseling the patient with greater than 50% of the time spent with direct patient care and coordination.        Jose Roberto Menard PA-C  Critical Care Medicine  [facility]

## 2024-07-22 NOTE — ASSESSMENT & PLAN NOTE
--Palliative care consulted, appreciate their assistance in clarification of GOC/POC with patient and family.

## 2024-07-22 NOTE — SUBJECTIVE & OBJECTIVE
Interval History: Juhi is laying in bed this am, wearing nasal cannula, family at bedside. She had a rough weekend with lots of diarrhea, turns out she is c diff positive.     Review of patient's allergies indicates:   Allergen Reactions    Percocet [oxycodone-acetaminophen] Itching    Januvia [sitagliptin]     Jardiance [empagliflozin]      Leg cramps    Lipitor [atorvastatin] Other (See Comments)     Severe leg pain    Linaclotide Other (See Comments) and Nausea And Vomiting     Does not remember    Lubiprostone Other (See Comments) and Palpitations     Does not remember     Current Facility-Administered Medications   Medication Frequency    0.9%  NaCl infusion (CRRT USE ONLY) Continuous    0.9%  NaCl infusion (for blood administration) Q24H PRN    acetaminophen tablet 325 mg Q6H PRN    dextromethorphan-guaiFENesin  mg/5 ml liquid 5 mL Q4H PRN    dextrose 10% bolus 125 mL 125 mL PRN    dextrose 10% bolus 250 mL 250 mL PRN    diphenhydrAMINE capsule 25 mg Q6H PRN    glucagon (human recombinant) injection 1 mg PRN    glucose chewable tablet 16 g PRN    glucose chewable tablet 24 g PRN    heparin (porcine) injection Once    heparin 25,000 units in dextrose 5% (100 units/ml) IV bolus from bag LOW INTENSITY nomogram - [facility] PRN    heparin 25,000 units in dextrose 5% (100 units/ml) IV bolus from bag LOW INTENSITY nomogram - [facility] PRN    heparin 25,000 units in dextrose 5% 250 mL (100 units/mL) infusion LOW INTENSITY nomogram - [facility] Continuous    HYDROcodone-acetaminophen 5-325 mg per tablet 1 tablet Q8H PRN    LIDOcaine 5 % patch 2 patch Q24H    magnesium sulfate 2g in water 50mL IVPB (premix) PRN    metoprolol injection 5 mg Q6H PRN    metoprolol tartrate (LOPRESSOR) split tablet 12.5 mg BID    miconazole NITRATE 2 % top powder BID    midodrine tablet 10 mg Daily PRN    midodrine tablet 10 mg TID    naloxone 0.4 mg/mL injection 0.02 mg PRN    ondansetron injection 4 mg Q6H PRN    simethicone chewable tablet 80 mg  TID PRN    sodium chloride 0.9% flush 10 mL PRN    sodium chloride 0.9% flush 10 mL PRN    sodium phosphate 20.01 mmol in D5W 250 mL IVPB PRN    sodium phosphate 30 mmol in D5W 250 mL IVPB PRN    sodium phosphate 39.99 mmol in D5W 250 mL IVPB PRN    vancomycin 125 mg/5 mL oral solution 125 mg Q6H       Objective:     Vital Signs (Most Recent):  Temp: 98 °F (36.7 °C) (07/22/24 1101)  Pulse: 97 (07/22/24 1101)  Resp: 20 (07/22/24 1101)  BP: 117/65 (07/22/24 1101)  SpO2: (!) 93 % (07/22/24 1101) Vital Signs (24h Range):  Temp:  [98 °F (36.7 °C)-98.7 °F (37.1 °C)] 98 °F (36.7 °C)  Pulse:  [] 97  Resp:  [16-45] 20  SpO2:  [89 %-96 %] 93 %  BP: ()/(50-98) 117/65     Weight: 128.5 kg (283 lb 4.7 oz) (07/17/24 1209)  Body mass index is 44.37 kg/m².  Body surface area is 2.46 meters squared.    I/O last 3 completed shifts:  In: 1604.8 [P.O.:120; I.V.:1431.8; IV Piggyback:53]  Out: 1545 [Other:1545]     Physical Exam  Constitutional:       General: She is not in acute distress.     Appearance: Normal appearance. She is obese.   HENT:      Head: Normocephalic and atraumatic.      Mouth/Throat:      Mouth: Mucous membranes are moist.      Pharynx: Oropharynx is clear.   Cardiovascular:      Rate and Rhythm: Normal rate and regular rhythm.      Heart sounds: Normal heart sounds.   Pulmonary:      Effort: Pulmonary effort is normal.      Breath sounds: Normal breath sounds.      Comments: Wearing nasal cannula  Abdominal:      Palpations: Abdomen is soft.   Musculoskeletal:      Right lower leg: Edema (2+) present.      Left lower leg: Edema (2+) present.      Comments: Small amount of weeping in lower extremities.    Skin:     General: Skin is warm and dry.   Neurological:      General: No focal deficit present.      Mental Status: She is alert. She is disoriented.      Motor: Weakness present.          Significant Labs:  All labs within the past 24 hours have been reviewed.     Significant Imaging:  All imaging with  the past 24 hours have been reviewed.

## 2024-07-22 NOTE — SUBJECTIVE & OBJECTIVE
Interval History/Significant Events: Positive for Clostridium difficile so started on PO vancomycin. Frequent diarrheal episodes resulting in anal irritation.    Review of Systems   Constitutional:  Negative for chills and diaphoresis.   HENT:  Negative for postnasal drip.    Eyes:  Negative for visual disturbance.   Respiratory:  Negative for shortness of breath.    Cardiovascular:  Negative for chest pain.   Gastrointestinal:  Positive for diarrhea. Negative for abdominal pain and constipation.   Genitourinary:  Negative for hematuria.   Musculoskeletal:  Negative for myalgias.   Skin:  Negative for rash.   Neurological:  Negative for headaches.   Hematological:  Negative for adenopathy.     Objective:     Vital Signs (Most Recent):  Temp: 98.7 °F (37.1 °C) (07/22/24 0300)  Pulse: 88 (07/22/24 0800)  Resp: 20 (07/22/24 0800)  BP: 130/60 (07/22/24 0920)  SpO2: (!) 94 % (07/22/24 0800) Vital Signs (24h Range):  Temp:  [98.1 °F (36.7 °C)-98.7 °F (37.1 °C)] 98.7 °F (37.1 °C)  Pulse:  [] 88  Resp:  [16-45] 20  SpO2:  [88 %-96 %] 94 %  BP: ()/(49-98) 130/60   Weight: 128.5 kg (283 lb 4.7 oz)  Body mass index is 44.37 kg/m².      Intake/Output Summary (Last 24 hours) at 7/22/2024 0942  Last data filed at 7/22/2024 0600  Gross per 24 hour   Intake 379.22 ml   Output --   Net 379.22 ml          Physical Exam  Vitals reviewed.   Constitutional:       Appearance: She is well-developed.   HENT:      Head: Normocephalic and atraumatic.      Nose: Nose normal.      Mouth/Throat:      Pharynx: Oropharynx is clear.   Eyes:      General: No scleral icterus.        Right eye: No discharge.         Left eye: No discharge.      Conjunctiva/sclera: Conjunctivae normal.      Pupils: Pupils are equal, round, and reactive to light.   Neck:      Thyroid: No thyromegaly.      Vascular: No JVD.      Trachea: Trachea normal. No tracheal deviation.   Cardiovascular:      Rate and Rhythm: Normal rate and regular rhythm.      Heart  sounds: Normal heart sounds, S1 normal and S2 normal. No murmur heard.     No friction rub. No gallop.   Pulmonary:      Effort: Pulmonary effort is normal. No respiratory distress.      Breath sounds: Normal breath sounds. No wheezing or rales.   Chest:      Chest wall: No tenderness.   Abdominal:      General: Bowel sounds are normal. There is no distension.      Palpations: Abdomen is soft. There is no mass.      Tenderness: There is no abdominal tenderness. There is no guarding or rebound.   Musculoskeletal:         General: No tenderness or deformity. Normal range of motion.      Cervical back: Full passive range of motion without pain, normal range of motion and neck supple.   Lymphadenopathy:      Cervical: No cervical adenopathy.   Skin:     General: Skin is warm and dry.      Findings: No abrasion or bruising.   Neurological:      Cranial Nerves: No cranial nerve deficit.      Coordination: Coordination normal.            Vents:  Vent Mode: Spont (07/16/24 1003)  Set Rate: 15 BPM (07/16/24 0903)  Vt Set: 420 mL (07/16/24 0903)  Pressure Support: 5 cmH20 (07/16/24 1003)  PEEP/CPAP: 5 cmH20 (07/16/24 1003)  Oxygen Concentration (%): 30 (07/16/24 1003)  Peak Airway Pressure: 10 cmH20 (07/16/24 1003)  Plateau Pressure: 0 cmH20 (07/16/24 1003)  Total Ve: 5.45 L/m (07/16/24 1003)  Negative Inspiratory Force (cm H2O): 0 (07/16/24 1003)  Lines/Drains/Airways       Central Venous Catheter Line  Duration                  Hemodialysis Catheter 07/11/24 1219 left subclavian 10 days    Trialysis (Dialysis) Catheter 07/20/24 1120 left femoral 1 day                  Significant Labs:    CBC/Anemia Profile:  Recent Labs   Lab 07/21/24  0306 07/21/24  1308 07/22/24  0510   WBC 25.02* 24.23* 23.88*   HGB 8.5* 8.5* 8.8*   HCT 25.6* 27.5* 28.5*    294 314   MCV 77* 81* 82   RDW 21.6* 22.2* 22.5*        Chemistries:  Recent Labs   Lab 07/21/24  0306 07/21/24  1410 07/21/24  2144 07/22/24  0510    139 140 139   K  4.7 4.9 4.7 4.7    111* 109 109   CO2 24 24 22* 23   BUN 10 16 20 21   CREATININE 1.4 2.1* 2.6* 2.6*   CALCIUM 7.6* 7.8* 8.0* 7.8*   ALBUMIN 1.3* 1.3* 1.3* 1.3*   PROT 4.2*  --   --  4.3*   BILITOT 0.9  --   --  0.9   ALKPHOS 169*  --   --  187*   ALT <5*  --   --  6*   AST 22  --   --  30   MG 1.9 1.9 2.0 2.0   PHOS 2.1* 2.9 3.2 3.4       None    Significant Imaging:  I have reviewed all pertinent imaging results/findings within the past 24 hours.

## 2024-07-22 NOTE — ASSESSMENT & PLAN NOTE
--PD catheter removed 07/15.   --Tunneled dialysis catheter was not functioning overnight.   --Trialysis catheter placed in left femoral on 7/20.  --Requested HD trial today.

## 2024-07-22 NOTE — SUBJECTIVE & OBJECTIVE
Interval History:        The patient was able to finish 12 hours of SLED after increasing the hourly Heparin drip to 500 IU/hour. Today hemodynamically she is stable. Her Metabolic and renal profiles are stable.  She is having watery diarrhea. Her WBC is 25 K/uL.      Will not do dialysis today and reassess her  clinical condition  within 24 hours.      With this Leukocytosis we will not be able to move and change her Cuffed tunneled HD cath.         Will plan renal replacement therapy tomorrow.       Review of patient's allergies indicates:   Allergen Reactions    Percocet [oxycodone-acetaminophen] Itching    Januvia [sitagliptin]     Jardiance [empagliflozin]      Leg cramps    Lipitor [atorvastatin] Other (See Comments)     Severe leg pain    Linaclotide Other (See Comments) and Nausea And Vomiting     Does not remember    Lubiprostone Other (See Comments) and Palpitations     Does not remember     Current Facility-Administered Medications   Medication Frequency    0.9%  NaCl infusion (for blood administration) Q24H PRN    acetaminophen tablet 325 mg Q6H PRN    dextromethorphan-guaiFENesin  mg/5 ml liquid 5 mL Q4H PRN    dextrose 10% bolus 125 mL 125 mL PRN    dextrose 10% bolus 250 mL 250 mL PRN    diphenhydrAMINE capsule 25 mg Q6H PRN    glucagon (human recombinant) injection 1 mg PRN    glucose chewable tablet 16 g PRN    glucose chewable tablet 24 g PRN    heparin 25,000 units in dextrose 5% (100 units/ml) IV bolus from bag LOW INTENSITY nomogram - [facility] PRN    heparin 25,000 units in dextrose 5% (100 units/ml) IV bolus from bag LOW INTENSITY nomogram - [facility] PRN    heparin 25,000 units in dextrose 5% 250 mL (100 units/mL) infusion LOW INTENSITY nomogram - [facility] Continuous    HYDROcodone-acetaminophen 5-325 mg per tablet 1 tablet Q8H PRN    LIDOcaine 5 % patch 2 patch Q24H    metoprolol injection 5 mg Q6H PRN    metoprolol tartrate (LOPRESSOR) split tablet 12.5 mg BID    miconazole NITRATE 2 % top powder BID     midodrine tablet 10 mg Daily PRN    midodrine tablet 10 mg TID    naloxone 0.4 mg/mL injection 0.02 mg PRN    ondansetron injection 4 mg Q6H PRN    simethicone chewable tablet 80 mg TID PRN    sodium chloride 0.9% flush 10 mL PRN    sodium chloride 0.9% flush 10 mL PRN       Objective:     Vital Signs (Most Recent):  Temp: 98.1 °F (36.7 °C) (07/21/24 1900)  Pulse: 105 (07/21/24 1945)  Resp: (!) 22 (07/21/24 1945)  BP: 111/67 (07/21/24 1930)  SpO2: (!) 93 % (07/21/24 1945) Vital Signs (24h Range):  Temp:  [98.1 °F (36.7 °C)-98.4 °F (36.9 °C)] 98.1 °F (36.7 °C)  Pulse:  [] 105  Resp:  [17-45] 22  SpO2:  [80 %-96 %] 93 %  BP: ()/(49-89) 111/67     Weight: 128.5 kg (283 lb 4.7 oz) (07/17/24 1209)  Body mass index is 44.37 kg/m².  Body surface area is 2.46 meters squared.    I/O last 3 completed shifts:  In: 3498.6 [I.V.:3445.6; IV Piggyback:53]  Out: 3556 [Other:3556]     Vitals:    07/21/24 1900 07/21/24 1915 07/21/24 1930 07/21/24 1945   BP: (!) 85/54  111/67    BP Location: Right forearm      Patient Position: Lying      Pulse: 105 108 105 105   Resp: 19 (!) 22 (!) 22 (!) 22   Temp: 98.1 °F (36.7 °C)      TempSrc: Oral      SpO2: (!) 92% (!) 92% (!) 93% (!) 93%   Weight:       Height:          Physical Exam  HENT:      Head: Normocephalic.      Nose: Nose normal.   Cardiovascular:      Rate and Rhythm: Normal rate.      Pulses: Normal pulses.   Pulmonary:      Effort: Pulmonary effort is normal.   Musculoskeletal:      Cervical back: Normal range of motion.      Right lower leg: Edema present.      Left lower leg: Edema present.   Neurological:      Mental Status: She is alert.          Significant Labs:  BMP:   Recent Labs   Lab 07/21/24  1410   *      K 4.9   *   CO2 24   BUN 16   CREATININE 2.1*   CALCIUM 7.8*   MG 1.9     CBC:   Recent Labs   Lab 07/21/24  1308   WBC 24.23*   RBC 3.39*   HGB 8.5*   HCT 27.5*      MCV 81*   MCH 25.1*   MCHC 30.9*     CMP:   Recent Labs    Lab 07/21/24  0306 07/21/24  1410   * 151*   CALCIUM 7.6* 7.8*   ALBUMIN 1.3* 1.3*   PROT 4.2*  --     139   K 4.7 4.9   CO2 24 24    111*   BUN 10 16   CREATININE 1.4 2.1*   ALKPHOS 169*  --    ALT <5*  --    AST 22  --    BILITOT 0.9  --      LFTs:   Recent Labs   Lab 07/21/24  0306 07/21/24  1410   ALT <5*  --    AST 22  --    ALKPHOS 169*  --    BILITOT 0.9  --    PROT 4.2*  --    ALBUMIN 1.3* 1.3*     All labs within the past 24 hours have been reviewed.     Impression and plan:    Acute Resp. Failure with Hypoxemia.    ESRD shifted from PD to HD with Lt IJ cuffed tunneled Catheter. She had flow issues in the catheter.   Diarrhia 5 time will need to R/O C Dif.   Rt. Nephrectomy for RCC  HTN with labile BP.   DM with stable sugar.   A fib.  With rapid rate.   PAD  CHF with Ischemic cardiomyopathy.   CAD post cardiac arrest 6/25/2024  NICOLE  Morbid obesity BMI 44  Gout    PENNY MOLINA.Alexandro. MD. BRANDONN. FACP.  , [employer] [facility] [employer] ([place]  Nephrology Consultant. [facility].   [address] [place] [address].    email: [email or web address].  Tel: Office: [phone]

## 2024-07-22 NOTE — ASSESSMENT & PLAN NOTE
--Latest A1C 6.8; takes metformin at home.   --Target -180. Within goal.   --Continue to monitor.

## 2024-07-23 NOTE — PROGRESS NOTES
07/23/24 0000   Treatment   Treatment Type SLED   Treatment Status Discontinued treatment   Dialysis Machine Number [id number]   Dialyzer Time (hours) 6.1   BVP (Liters) 68.4 L   Solutions Labeled and Current  Yes   Access Temporary Cath   Catheter Dressing Intact  Yes   Alarms Engaged Yes   CRRT Comments sled tx completed   CRRT Sodium Chloride   CRRT Sodium Chloride Volume 199.87 mL   CRRT Hourly Documentation   Blood Flow (mL/min) 200   UF Rate 300 cc/hr   Arterial Pressure (mmHg) 70 mmHg   Venous Pressure (mmHg) 70 mmHg   Effluent Pressure (EP) (mmHg) 10 mmHg   Total UF (Hourly Cleared) (mL) 11     Gave report to primary rn, 6 hr sled tx completed, deaccessed, vss

## 2024-07-23 NOTE — ASSESSMENT & PLAN NOTE
ESRD - was briefly on PD then HD, having issues w tunnel cath and now w trialysis in place in [facility]. S/p R nephrectomy RCC  C diff positive - on oral vanc  HTN  DM  Afib w rvr  CHF w ischemic cardiomyopathy  S/p cardiac arrest     Plan/Recommendations  -SLED yesterday tolerated well for 6 hours. No dialysis today. On heparin drip.   - Edema 2-3+. If BP stays stable then tomorrow we will plan for a trial of HD.  -Recommend albumin 25 g q8h for hypoalbuminemia for volume expansion for the next 48 hrs  -Recommend doppler R LE due to pain out of proportion (although she is on AC)  - Planning for replacement of tunnel cath w interventional nephrology, however given elevated WBC (likely from c diff) would hold off on this for now. Improved WBC today to 18.   -FU on protein c and s labs  - Tunnel cath placed 7/11 - arterial port suctioning on vessel wall, trialysis placed.   - Acute resp failure - on supplemental O2  - Renally adjust medications  - Pre and Post-HD weights   - Continue to monitor intake and output

## 2024-07-23 NOTE — PT/OT/SLP PROGRESS
Occupational Therapy   Treatment    Name: Juhi Taylor  MRN: [id number]  Admitting Diagnosis:  ESRD (end stage renal disease)  8 Days Post-Op    Recommendations:     Discharge Recommendations: Moderate Intensity Therapy  Discharge Equipment Recommendations:  bedside commode, wheelchair, walker, rolling, bath bench, grab bar, hospital bed  Barriers to discharge:   (current level of assistance needed)    Assessment:     Juhi Taylor is a 74 y.o. female with a medical diagnosis of ESRD (end stage renal disease).  She presents cooperative and motivated with improvement in functional transfers. Pt did endorse increased fatigue this session.HR elevated as high as 149, but not sustaining, and pt asymptomatic; RN present and aware.  Performance deficits affecting function are weakness, gait instability, impaired balance, impaired endurance, impaired self care skills, impaired functional mobility, impaired cardiopulmonary response to activity, decreased safety awareness.     Rehab Prognosis:  Good; patient would benefit from acute skilled OT services to address these deficits and reach maximum level of function.       Plan:     Patient to be seen 4 x/week to address the above listed problems via self-care/home management, therapeutic activities, therapeutic exercises, neuromuscular re-education  Plan of Care Expires: 08/16/24  Plan of Care Reviewed with: patient, sibling    Subjective     Chief Complaint: fatigue  Patient/Family Comments/goals: to get better and go home  Pain/Comfort:  Pain Rating 1: 0/10  Pain Rating 2: 0/10    Objective:     Communicated with: RN prior to session.  Patient found supine with blood pressure cuff, pulse ox (continuous), telemetry, central line upon OT entry to room.    General Precautions: Standard, fall, special contact    Orthopedic Precautions:N/A  Braces: N/A  Respiratory Status: Room air     Occupational Performance:     Bed Mobility:    Patient completed Scooting/Bridging  with moderate assistance and 2 persons  Patient completed Supine to Sit with maximal assistance and 2 persons  Patient completed Sit to Supine with maximal assistance and 2 persons     Functional Mobility/Transfers:  Patient completed Sit <> Stand Transfer with maximal assistance and of 2 persons  with  hand-held assist from EOB   Functional Mobility: NT as pt c/o fatigue and diarrhea    Activities of Daily Living:  Grooming: minimum assistance seated EOB  Toileting: total assistance bed level      AMPAC 6 Click ADL: 10    Treatment & Education:  Pt ed on OT POC  Pt found soiled and completed rolling for pericare with mod A x 2  using side rails to assist  Pt sat EOB x 20 min while engaged in ROM and self-care tasks  Pt stood from EOB with Mod A x 2 achieving fully upright posture  Pt declined 2* trial 2* fatigue and diarrhea  Pt ed on ROM ex's daily for increased overall strength and endurance to reduce risk of hospital acquired weakness  Pt ed on safety with ADL and fall risk  Reinforced use of call button to contact nursing staff for assistance with mobility    Patient left supine with all lines intact, call button in reach, RN notified, and sister present    GOALS:   Multidisciplinary Problems       Occupational Therapy Goals          Problem: Occupational Therapy    Goal Priority Disciplines Outcome Interventions   Occupational Therapy Goal     OT, PT/OT Progressing    Description: Goals to be met by: 08/04/24     Patient will increase functional independence with ADLs by performing:    UE Dressing with Modified Ida.  LE Dressing with Minimal Assistance.  Grooming while standing at sink with Stand-by Assistance.  Toileting from bedside commode with Minimal Assistance for hygiene and clothing management.   Toilet transfer to bedside commode with Supervision.    DME:  Tub transfer bench is required for pt to return to t/s use with shower chair at present unsuitable with need for mobiltiy greater than pt  can safely complete at present.     Patient has a mobility limitation that significantly impairs their ability to participate in one or more mobility related activities of daily living, including toileting. This deficit can be resolved by using a bedside commode. Patient demonstrates mobility limitations that will cause them to be confined to one room at home without bathroom access for up to 30 days. Using a bedside commode will greatly improve the patient's ability to participate in MRADLs.     Ambulation needs TBD on progress.                              Time Tracking:     OT Date of Treatment: 07/23/24  OT Start Time: 1003  OT Stop Time: 1041  OT Total Time (min): 38 min    Billable Minutes:Self Care/Home Management 23  Therapeutic Activity 15          Number of DAYSI visits since last OT visit: 1    7/23/2024

## 2024-07-23 NOTE — SUBJECTIVE & OBJECTIVE
Interval History: Juhi is still having some diarrhea overnight, which is distressing to her.     Review of patient's allergies indicates:   Allergen Reactions    Percocet [oxycodone-acetaminophen] Itching    Januvia [sitagliptin]     Jardiance [empagliflozin]      Leg cramps    Lipitor [atorvastatin] Other (See Comments)     Severe leg pain    Linaclotide Other (See Comments) and Nausea And Vomiting     Does not remember    Lubiprostone Other (See Comments) and Palpitations     Does not remember     Current Facility-Administered Medications   Medication Frequency    0.9%  NaCl infusion (CRRT USE ONLY) Continuous    0.9%  NaCl infusion (for blood administration) Q24H PRN    acetaminophen tablet 325 mg Q6H PRN    albumin human 25% bottle 25 g Q8H    dextromethorphan-guaiFENesin  mg/5 ml liquid 5 mL Q4H PRN    dextrose 10% bolus 125 mL 125 mL PRN    dextrose 10% bolus 250 mL 250 mL PRN    diphenhydrAMINE capsule 25 mg Q6H PRN    glucagon (human recombinant) injection 1 mg PRN    glucose chewable tablet 16 g PRN    glucose chewable tablet 24 g PRN    heparin 25,000 units in dextrose 5% (100 units/ml) IV bolus from bag LOW INTENSITY nomogram - [facility] PRN    heparin 25,000 units in dextrose 5% (100 units/ml) IV bolus from bag LOW INTENSITY nomogram - [facility] PRN    heparin 25,000 units in dextrose 5% 250 mL (100 units/mL) infusion LOW INTENSITY nomogram - [facility] Continuous    HYDROcodone-acetaminophen 5-325 mg per tablet 1 tablet Q8H PRN    insulin aspart U-100 pen 0-10 Units QID (AC + HS) PRN    LIDOcaine 5 % patch 2 patch Q24H    magnesium sulfate 2g in water 50mL IVPB (premix) PRN    metoprolol injection 5 mg Q6H PRN    metoprolol succinate (TOPROL-XL) 24 hr tablet 50 mg Daily    miconazole NITRATE 2 % top powder BID    midodrine tablet 10 mg Daily PRN    midodrine tablet 10 mg TID    naloxone 0.4 mg/mL injection 0.02 mg PRN    NORepinephrine 4 mg in dextrose 5% 250 mL infusion (premix) Continuous    ondansetron  injection 4 mg Q6H PRN    simethicone chewable tablet 80 mg TID PRN    sodium chloride 0.9% flush 10 mL PRN    sodium chloride 0.9% flush 10 mL PRN    sodium phosphate 20.01 mmol in D5W 250 mL IVPB PRN    sodium phosphate 30 mmol in D5W 250 mL IVPB PRN    sodium phosphate 39.99 mmol in D5W 250 mL IVPB PRN    vancomycin 125 mg/5 mL oral solution 125 mg Q6H       Objective:     Vital Signs (Most Recent):  Temp: 97.9 °F (36.6 °C) (07/23/24 1101)  Pulse: 92 (07/23/24 1101)  Resp: (!) 22 (07/23/24 1101)  BP: 122/61 (07/23/24 1101)  SpO2: (!) 94 % (07/23/24 1101) Vital Signs (24h Range):  Temp:  [95.8 °F (35.4 °C)-98.4 °F (36.9 °C)] 97.9 °F (36.6 °C)  Pulse:  [] 92  Resp:  [13-28] 22  SpO2:  [91 %-97 %] 94 %  BP: ()/(47-75) 122/61     Weight: 128.5 kg (283 lb 4.7 oz) (07/17/24 1209)  Body mass index is 44.37 kg/m².  Body surface area is 2.46 meters squared.    I/O last 3 completed shifts:  In: 2509.5 [P.O.:490; I.V.:1993; IV Piggyback:26.5]  Out: 1239 [Other:1239]     Physical Exam  Constitutional:       General: She is not in acute distress.     Appearance: Normal appearance. She is obese.   HENT:      Head: Normocephalic and atraumatic.      Mouth/Throat:      Mouth: Mucous membranes are moist.      Pharynx: Oropharynx is clear.   Cardiovascular:      Rate and Rhythm: Normal rate and regular rhythm.      Heart sounds: Normal heart sounds.   Pulmonary:      Effort: Pulmonary effort is normal.      Breath sounds: Normal breath sounds.      Comments: Wearing nasal cannula  Abdominal:      Palpations: Abdomen is soft.   Musculoskeletal:      Right lower leg: Edema (2+) present.      Left lower leg: Edema (2+) present.      Comments: Small amount of weeping in lower extremities.    Skin:     General: Skin is warm and dry.   Neurological:      General: No focal deficit present.      Mental Status: She is alert. She is disoriented.      Motor: Weakness present.          Significant Labs:  All labs within the past  24 hours have been reviewed.     Significant Imaging:  All imaging with the past 24 hours have been reviewed.

## 2024-07-23 NOTE — ASSESSMENT & PLAN NOTE
--PD catheter removed 07/15.   --Tunneled dialysis catheter is not functioning.   --Trialysis catheter placed in left femoral on 7/20.  --Requested HD trial today.   --Start albumin 25 g q8h x 2 days per Nephrology.

## 2024-07-23 NOTE — PROGRESS NOTES
[facility] - Cardiac Medical ICU  Critical Care Medicine  Progress Note    Patient Name: Juhi Taylor  MRN: [id number]  Admission Date: 7/3/2024  Hospital Length of Stay: 20 days  Code Status: DNR  Attending Provider: Milind Monte MD  Primary Care Provider: Tony Sadler MD   Principal Problem: ESRD (end stage renal disease)    Subjective:     HPI:  Juhi Taylor is a 74-year-old female with a past medical history of CKD stage 5 with recent PD catheter placement on 6/4, RCC s/p right nephrectomy, T2DM, obesity, CAD s/p PCI to Lcx and LAD in 11/2020 and HFmREF who is admitted as a transfer from [facility] for higher level of care and further cardiac evaluation. Nephrology consulted for hemodialysis.     She initially presented on 6/18/24 to [facility] for hypervolemia in the setting of CKD 4-5 for which she had undergone elective PD catheter placement complicated by catheter site leaking. She had a complex hospital course, was initially placed on furosemide gtt without significant improvement and later underwent placement of tunneled line for HD with volume removal. During her second dialysis session she went into PEA arrest for which she was resuscitated, intubated and transferred to ICU. She was noted to have elevated troponin and subsequent TTE found reduced EF from 40-45% to 20-25% for which coronary angiography was recommended. Her hospital course was also complicated by dialysis line infection, significant bleeding around the catheter site, bradycardia and subsequent atrial fibrillation with RVR. Given the catheter site bleeding, anticoagulation was temporarily held. She has reported allergy to amiodarone, was initially placed on diltiazem for atrial fibrillation despite reduced EF. She discussed GOC at outside hospital where it appears that she lacked capacity per note and decided [place] [facility] [place] At that [place], General Surgery was planning to place a left tunneled catheter due  to concerns of the right side not being suitable     Upon admit, patient appeared drowsy but conversant. Daughter reports that she typically gets lethargic 2/2 to the volume overload due to not receiving dialysis. Patient reported b/l left lower extremity pain. Critical Care Medicine was consulted for emergent trialysis line placement and dialysis.     Ms. Taylor was admitted to MICU on 7/8/24 for urgent dialysis line placement and initiation of dialysis. Patient received short run of CRRT overnight without issue. Encephalopathy improving with continued CRRT treatments. She has had persistent AFib with RVR for which received Digoxin loading dose (renally dosed) and was restarted PO carvedilol. Patient remains in persistent AFib RVR despite therapeutic digoxin level and uptitration of carvedilol dosages so she was started on an amiodarone infusion. She was then transitioned to amio PO and started on heparin infusion for atrial fibrillation. General Surgery removed her peritoneal dialysis catheter on 7/15/24, but upon induction she vomited copious amounts of bile and was endotracheally intubated. She was extubated to nasal cannula on 7/16/24.     She has had difficulties with her tunneled HD line with sluggish flow especially through the venous side.  She would have a 4-hour instillation of tPA in each line before being able to run iHD, and even then it would be stopped before completion.  Interventional Nephrology plans to replace the line at some point next week.  Of note, Palliative Care was also consulted and patient had made her wishes known that she is a DNR code status.  She was still vacillating between replacing her tunneled HD line given her adverse event during her last procedure or going home with hospice.  The family was upset with her decision and April, her niece, asked that Palliative Care not return to see the patient.      Hospital/ICU Course:  Ms. Taylor was admitted to MICU on 7/8 for urgent  dialysis line placement and initiated of HD. Patient received short run of CRRT overnight without issue. Encephalopathy improving with continued CRRT treatments. Persistent AFib with RVR, received Digoxin loading dose (renally dosed), restarted PO Carvedilol. Patient remains in persistent AFib RVR despite therapeutic digoxin level and uptitration of Carvedilol levels, started on Amiodarone infusion overnight. Transitioned to amio PO, started on heparin gtt for afib AC. Tolerated HD well overnight without complications and less encephalopathic upon exam. Planning for gen surg to remove PD catheter. Upon induction pt vomited copious amounts of bile, was intubated. PD cathter removed successfully. Extubated to NC 7/16.     7/18:  Patient made DNR after Palliative Care discussions yesterday.  Tolerated SLED 12 hours overnight.  Discussed difficulties regarding HD line with Interventional Nephrology and plans to replace it next week if patient it amenable.  Patient was stepped down from ICU.  7/19:  Stepped back up to medical ICU with plans for overnight SLED x 3 days.    On 7/19 Nephrology requested that the patient be stepped back up to the medical ICU for SLED. A temporary trialysis HD catheter was placed on 7/20/24 in the groin. She also began having diarrhea, which tested positive for Clostridium difficile so she was started on PO vancomycin 7/22/24.    Interval History/Significant Events: No significant events overnight.  Still having diarrhea.     Review of Systems   Constitutional:  Negative for chills and diaphoresis.   HENT:  Negative for postnasal drip.    Eyes:  Negative for visual disturbance.   Respiratory:  Negative for shortness of breath.    Cardiovascular:  Negative for chest pain.   Gastrointestinal:  Negative for abdominal pain and constipation.   Genitourinary:  Negative for hematuria.   Musculoskeletal:  Negative for myalgias.   Skin:  Negative for rash.   Neurological:  Negative for headaches.    Hematological:  Negative for adenopathy.     Objective:     Vital Signs (Most Recent):  Temp: 97.9 °F (36.6 °C) (07/23/24 1101)  Pulse: 92 (07/23/24 1101)  Resp: (!) 22 (07/23/24 1101)  BP: 122/61 (07/23/24 1101)  SpO2: (!) 94 % (07/23/24 1101) Vital Signs (24h Range):  Temp:  [95.8 °F (35.4 °C)-98.4 °F (36.9 °C)] 97.9 °F (36.6 °C)  Pulse:  [] 92  Resp:  [13-43] 22  SpO2:  [91 %-97 %] 94 %  BP: ()/(47-75) 122/61   Weight: 128.5 kg (283 lb 4.7 oz)  Body mass index is 44.37 kg/m².      Intake/Output Summary (Last 24 hours) at 7/23/2024 1108  Last data filed at 7/23/2024 0600  Gross per 24 hour   Intake 2184.34 ml   Output 1239 ml   Net 945.34 ml          Physical Exam  Vitals reviewed.   Constitutional:       Appearance: She is well-developed.   HENT:      Head: Normocephalic and atraumatic.      Nose: Nose normal.      Mouth/Throat:      Pharynx: Oropharynx is clear.   Eyes:      General: No scleral icterus.        Right eye: No discharge.         Left eye: No discharge.      Conjunctiva/sclera: Conjunctivae normal.      Pupils: Pupils are equal, round, and reactive to light.   Neck:      Thyroid: No thyromegaly.      Vascular: No JVD.      Trachea: Trachea normal. No tracheal deviation.   Cardiovascular:      Rate and Rhythm: Normal rate and regular rhythm.      Heart sounds: Normal heart sounds, S1 normal and S2 normal. No murmur heard.     No friction rub. No gallop.   Pulmonary:      Effort: Pulmonary effort is normal. No respiratory distress.      Breath sounds: Normal breath sounds. No wheezing or rales.   Chest:      Chest wall: No tenderness.   Abdominal:      General: Bowel sounds are normal. There is no distension.      Palpations: Abdomen is soft. There is no mass.      Tenderness: There is no abdominal tenderness. There is no guarding or rebound.   Musculoskeletal:         General: No tenderness or deformity. Normal range of motion.      Cervical back: Full passive range of motion without  "pain, normal range of motion and neck supple.   Lymphadenopathy:      Cervical: No cervical adenopathy.   Skin:     General: Skin is warm and dry.      Findings: No abrasion or bruising.   Neurological:      Cranial Nerves: No cranial nerve deficit.      Coordination: Coordination normal.            Vents:  Vent Mode: Spont (07/16/24 1003)  Set Rate: 15 BPM (07/16/24 0903)  Vt Set: 420 mL (07/16/24 0903)  Pressure Support: 5 cmH20 (07/16/24 1003)  PEEP/CPAP: 5 cmH20 (07/16/24 1003)  Oxygen Concentration (%): 30 (07/16/24 1003)  Peak Airway Pressure: 10 cmH20 (07/16/24 1003)  Plateau Pressure: 0 cmH20 (07/16/24 1003)  Total Ve: 5.45 L/m (07/16/24 1003)  Negative Inspiratory Force (cm H2O): 0 (07/16/24 1003)  Lines/Drains/Airways       Central Venous Catheter Line  Duration                  Hemodialysis Catheter 07/11/24 1219 left subclavian 11 days    Trialysis (Dialysis) Catheter 07/20/24 1120 left femoral 2 days                  Significant Labs:    CBC/Anemia Profile:  Recent Labs   Lab 07/21/24  1308 07/22/24  0510 07/23/24  0402   WBC 24.23* 23.88* 18.78*  18.78*   HGB 8.5* 8.8* 8.2*  8.2*   HCT 27.5* 28.5* 26.4*  26.4*    314 253  253   MCV 81* 82 83  83   RDW 22.2* 22.5* 22.8*  22.8*        Chemistries:  Recent Labs   Lab 07/22/24  0510 07/22/24 2004 07/23/24  0402    140 141   K 4.7 4.6 4.5    111* 111*   CO2 23 20* 25   BUN 21 17 17   CREATININE 2.6* 1.9* 2.2*   CALCIUM 7.8* 7.5* 7.6*   ALBUMIN 1.3* 1.2* 1.3*   PROT 4.3*  --  4.0*   BILITOT 0.9  --  0.8   ALKPHOS 187*  --  159*   ALT 6*  --  5*   AST 30  --  13   MG 2.0 1.9 2.1   PHOS 3.4 2.1* 2.7       None    Significant Imaging:  I have reviewed all pertinent imaging results/findings within the past 24 hours.    ABG  No results for input(s): "PH", "PO2", "PCO2", "HCO3", "BE" in the last 168 hours.  Assessment/Plan:     Cardiac/Vascular  Atrial fibrillation with RVR  --AFib with RVR appears new following cardiac arrest at OSH. "   --Patient endorses amiodarone reaction with itching, rash, and oral burning. Refused Sotalol, DCCV, and AICD placement at OSH.   --Was placed on Diltizem infusion and transitioned to PO metoprolol. Amio gtt restarted overnight 7/11 s/t uncontrolled afib with RVR. Transitioned to PO amio.  --Patient's rate remained elevated despite amiodarone. Appeared to respond better to metoprolol.   --Continue Toprol 50 mg daily.   --Apixaban on hold. Anticoagulation with a heparin infusion.      Acute on chronic combined systolic and diastolic heart failure  Echo    Left Ventricle: The left ventricle is moderately dilated. Normal wall thickness. Global hypokinesis present. There is severely reduced systolic function with a visually estimated ejection fraction of 20 - 25%.    Right Ventricle: Mild right ventricular enlargement. Wall thickness is normal. Right ventricle wall motion has global hypokinesis. Systolic function is moderately reduced.    Left Atrium: Left atrium is severely dilated.    Right Atrium: Right atrium is severely dilated.    Aortic Valve: The aortic valve is a trileaflet valve. There is mild aortic valve sclerosis.    Mitral Valve: There is mild regurgitation.    Tricuspid Valve: There is severe regurgitation.    Pulmonary Artery: There is moderate pulmonary hypertension. The estimated pulmonary artery systolic pressure is 61 mmHg.    IVC/SVC: Elevated venous pressure at 15 mmHg.    Pericardium: There is a trivial circumferential effusion. No indication of cardiac tamponade.    - Will require interventional cardiology for LHC vs PET for reduced EF once euvolemic  - LifeVest prior to discharge  - Entresto recommended by cardiology, but currently on hold due to hypotension.    Essential hypertension  --Takes carvedilol + clonidine at home.   --Hold antihypertensives due to hypotension.   --Continue metoprolol for rate control.       Renal/  * ESRD (end stage renal disease)  --PD catheter removed 07/15.    --Tunneled dialysis catheter is not functioning.   --Trialysis catheter placed in left femoral on 7/20.  --Requested HD trial today.   --Start albumin 25 g q8h x 2 days per Nephrology.     ID  Clostridium difficile infection  --Diagnosed 7/22/24.  --PO vancomycin 125 mg q6h x 10 days. Stop date: 8/1/24.    Endocrine  Severe obesity (BMI >= 40)  --Morbid obesity complicating her medical care.    Type 2 diabetes mellitus with microalbuminuria, without long-term current use of insulin  --Latest A1C 6.8; takes metformin at home.   --Target -180. Within goal.   --Continue to monitor.     GI  Diarrhea  Etiology unclear.   Stool studies pending.   Low risk for c,diff as she has not had much in the way of antibiotics recently.  Because she is going to the OR on Tuesday, must r/o all infectious causes including c.diff.    Palliative Care  Palliative care encounter  - Palliative care following, continued GOC with family. Plan for family meeting today 07/17    ACP (advance care planning)  --Palliative care consulted, appreciate their assistance in clarification of GOC/POC with patient and family.    Other  Debility  --PT/OT.      I spent >35 minutes reviewing patient records, examining, and counseling the patient with greater than 50% of the time spent with direct patient care and coordination.      Jose Roberto Menard PA-C  Critical Care Medicine  [facility] - Cardiac Medical ICU

## 2024-07-23 NOTE — CARE UPDATE
Ms. Taylor continues to tolerate dialysis and is awaiting a new tunneled catheter for continued dialysis. Palliative medicine will sign off at this time. Please re-consult if needed.

## 2024-07-23 NOTE — NURSING TRANSFER
Nursing Transfer Note      7/23/2024   5 AM    Nurse giving handoff: Allie CHAUDHARY  Nurse receiving handoff: Onesimo CHAUDHARY    Reason patient is being transferred: MD Order    Transfer To: [facility] [id number]    Transfer via bed    Transfer with cardiac monitoring    Transported by Allie CHAUDHARY    Transfer Vital Signs:  Blood Pressure:96/53  Heart Rate:90  O2:96  Temperature:98.4  Respirations:20    4eyes on Skin: yes    Medicines sent: Heparin gtt    Any special needs or follow-up needed: no    Patient belongings transferred with patient: Yes    Chart send with patient: Yes    Notified: Sibling      Upon arrival to floor: cardiac monitor applied, patient oriented to room, call bell in reach, and bed in lowest position

## 2024-07-23 NOTE — NURSING
CRRT SLED restarted to the left fem trialysis cath per orders placed by Dr. Alicea:      Order Questions    Question Answer Comment   Duration of treatment Other (specify) 6 hours   Dialyzer: R300    Dialysate Temperature (C) 36.5    Blood Flow Rate (mL/min) 200 mL/min    K+ 4 MEQ/L    Ca++ 2.25 MEQ/L    Na+ 145 meq/L    Bicarb 30 meq    Type of therapy: SLED (Slow Low-efficiency Dialysis)     cc/min    Ultra-filtration rate (cc/hr) 200-300 cc/hr         Primary nurse informed of the POC and the expected uf removal goal of 300ml/ hr as tolerated

## 2024-07-23 NOTE — SUBJECTIVE & OBJECTIVE
Interval History/Significant Events: No significant events overnight.  Still having diarrhea.     Review of Systems   Constitutional:  Negative for chills and diaphoresis.   HENT:  Negative for postnasal drip.    Eyes:  Negative for visual disturbance.   Respiratory:  Negative for shortness of breath.    Cardiovascular:  Negative for chest pain.   Gastrointestinal:  Negative for abdominal pain and constipation.   Genitourinary:  Negative for hematuria.   Musculoskeletal:  Negative for myalgias.   Skin:  Negative for rash.   Neurological:  Negative for headaches.   Hematological:  Negative for adenopathy.     Objective:     Vital Signs (Most Recent):  Temp: 97.9 °F (36.6 °C) (07/23/24 1101)  Pulse: 92 (07/23/24 1101)  Resp: (!) 22 (07/23/24 1101)  BP: 122/61 (07/23/24 1101)  SpO2: (!) 94 % (07/23/24 1101) Vital Signs (24h Range):  Temp:  [95.8 °F (35.4 °C)-98.4 °F (36.9 °C)] 97.9 °F (36.6 °C)  Pulse:  [] 92  Resp:  [13-43] 22  SpO2:  [91 %-97 %] 94 %  BP: ()/(47-75) 122/61   Weight: 128.5 kg (283 lb 4.7 oz)  Body mass index is 44.37 kg/m².      Intake/Output Summary (Last 24 hours) at 7/23/2024 1108  Last data filed at 7/23/2024 0600  Gross per 24 hour   Intake 2184.34 ml   Output 1239 ml   Net 945.34 ml          Physical Exam  Vitals reviewed.   Constitutional:       Appearance: She is well-developed.   HENT:      Head: Normocephalic and atraumatic.      Nose: Nose normal.      Mouth/Throat:      Pharynx: Oropharynx is clear.   Eyes:      General: No scleral icterus.        Right eye: No discharge.         Left eye: No discharge.      Conjunctiva/sclera: Conjunctivae normal.      Pupils: Pupils are equal, round, and reactive to light.   Neck:      Thyroid: No thyromegaly.      Vascular: No JVD.      Trachea: Trachea normal. No tracheal deviation.   Cardiovascular:      Rate and Rhythm: Normal rate and regular rhythm.      Heart sounds: Normal heart sounds, S1 normal and S2 normal. No murmur heard.     No  friction rub. No gallop.   Pulmonary:      Effort: Pulmonary effort is normal. No respiratory distress.      Breath sounds: Normal breath sounds. No wheezing or rales.   Chest:      Chest wall: No tenderness.   Abdominal:      General: Bowel sounds are normal. There is no distension.      Palpations: Abdomen is soft. There is no mass.      Tenderness: There is no abdominal tenderness. There is no guarding or rebound.   Musculoskeletal:         General: No tenderness or deformity. Normal range of motion.      Cervical back: Full passive range of motion without pain, normal range of motion and neck supple.   Lymphadenopathy:      Cervical: No cervical adenopathy.   Skin:     General: Skin is warm and dry.      Findings: No abrasion or bruising.   Neurological:      Cranial Nerves: No cranial nerve deficit.      Coordination: Coordination normal.            Vents:  Vent Mode: Spont (07/16/24 1003)  Set Rate: 15 BPM (07/16/24 0903)  Vt Set: 420 mL (07/16/24 0903)  Pressure Support: 5 cmH20 (07/16/24 1003)  PEEP/CPAP: 5 cmH20 (07/16/24 1003)  Oxygen Concentration (%): 30 (07/16/24 1003)  Peak Airway Pressure: 10 cmH20 (07/16/24 1003)  Plateau Pressure: 0 cmH20 (07/16/24 1003)  Total Ve: 5.45 L/m (07/16/24 1003)  Negative Inspiratory Force (cm H2O): 0 (07/16/24 1003)  Lines/Drains/Airways       Central Venous Catheter Line  Duration                  Hemodialysis Catheter 07/11/24 1219 left subclavian 11 days    Trialysis (Dialysis) Catheter 07/20/24 1120 left femoral 2 days                  Significant Labs:    CBC/Anemia Profile:  Recent Labs   Lab 07/21/24  1308 07/22/24  0510 07/23/24  0402   WBC 24.23* 23.88* 18.78*  18.78*   HGB 8.5* 8.8* 8.2*  8.2*   HCT 27.5* 28.5* 26.4*  26.4*    314 253  253   MCV 81* 82 83  83   RDW 22.2* 22.5* 22.8*  22.8*        Chemistries:  Recent Labs   Lab 07/22/24  0510 07/22/24 2004 07/23/24  0402    140 141   K 4.7 4.6 4.5    111* 111*   CO2 23 20* 25   BUN 21  17 17   CREATININE 2.6* 1.9* 2.2*   CALCIUM 7.8* 7.5* 7.6*   ALBUMIN 1.3* 1.2* 1.3*   PROT 4.3*  --  4.0*   BILITOT 0.9  --  0.8   ALKPHOS 187*  --  159*   ALT 6*  --  5*   AST 30  --  13   MG 2.0 1.9 2.1   PHOS 3.4 2.1* 2.7       None    Significant Imaging:  I have reviewed all pertinent imaging results/findings within the past 24 hours.

## 2024-07-23 NOTE — PROGRESS NOTES
[facility] - [facility]  Nephrology  Progress Note    Patient Name: Juhi Taylor  MRN: [id number]  Admission Date: 7/3/2024  Hospital Length of Stay: 20 days  Attending Provider: Milind Monte MD   Primary Care Physician: Tony Sadler MD  Principal Problem:ESRD (end stage renal disease)    Subjective:     HPI: 74-year-old female with prior history of CKD stage 5 with recent PD catheter placement on 6/4, RCC s/p right nephrectomy, T2DM, obesity, CAD s/p PCI to Lcx and LAD in 11/2020 and HFmREF who is admitted as a transfer from [facility] for higher level of care and further cardiac evaluation. Nephrology consulted for hemodialysis.     She initially presented on 6/18/24 to [facility] for hypervolemia in the setting of CKD 4-5 for which she had undergone elective PD catheter placement complicated by catheter site leaking. She had a complex hospital course, was initially placed on furosemide gtt without significant improvement and later underwent placement of tunneled line for HD with volume removal. During her second dialysis session she went into PEA arrest for which she was resuscitated, intubated and transferred to ICU. She was noted to have elevated troponin and subsequent TTE found reduced EF from 40-45% to 20-25% for which coronary angiography was recommended. Her hospital course was also complicated by dialysis line infection, significant bleeding around the catheter site, bradycardia and subsequent atrial fibrillation with RVR. Given the catheter site bleeding, anticoagulation was temporarily held. She has reported allergy to amiodarone, was initially placed on diltiazem for atrial fibrillation despite reduced EF. She discussed GOC at outside hospital where it appears that she lacked capacity per note and decided to pursue higher level of care at [facility] [place] [facility]. At that time, gen surgery was planning to place a left tunneled catheter due to concerns of the right side not being suitable    Upon admit,  patient appeared drowsy but conversant. Daughter reports that she typically gets lethargic 2/2 to the volume overload due to not receiving dialysis. Patient reported b/l left lower extremity pain. Daughter concerned about her not having dialysis in 4 days and wants a session today.     Interval History: Juhi is still having some diarrhea overnight, which is distressing to her.     Review of patient's allergies indicates:   Allergen Reactions    Percocet [oxycodone-acetaminophen] Itching    Januvia [sitagliptin]     Jardiance [empagliflozin]      Leg cramps    Lipitor [atorvastatin] Other (See Comments)     Severe leg pain    Linaclotide Other (See Comments) and Nausea And Vomiting     Does not remember    Lubiprostone Other (See Comments) and Palpitations     Does not remember     Current Facility-Administered Medications   Medication Frequency    0.9%  NaCl infusion (CRRT USE ONLY) Continuous    0.9%  NaCl infusion (for blood administration) Q24H PRN    acetaminophen tablet 325 mg Q6H PRN    albumin human 25% bottle 25 g Q8H    dextromethorphan-guaiFENesin  mg/5 ml liquid 5 mL Q4H PRN    dextrose 10% bolus 125 mL 125 mL PRN    dextrose 10% bolus 250 mL 250 mL PRN    diphenhydrAMINE capsule 25 mg Q6H PRN    glucagon (human recombinant) injection 1 mg PRN    glucose chewable tablet 16 g PRN    glucose chewable tablet 24 g PRN    heparin 25,000 units in dextrose 5% (100 units/ml) IV bolus from bag LOW INTENSITY nomogram - [facility] PRN    heparin 25,000 units in dextrose 5% (100 units/ml) IV bolus from bag LOW INTENSITY nomogram - [facility] PRN    heparin 25,000 units in dextrose 5% 250 mL (100 units/mL) infusion LOW INTENSITY nomogram - [facility] Continuous    HYDROcodone-acetaminophen 5-325 mg per tablet 1 tablet Q8H PRN    insulin aspart U-100 pen 0-10 Units QID (AC + HS) PRN    LIDOcaine 5 % patch 2 patch Q24H    magnesium sulfate 2g in water 50mL IVPB (premix) PRN    metoprolol injection 5 mg Q6H PRN    metoprolol  succinate (TOPROL-XL) 24 hr tablet 50 mg Daily    miconazole NITRATE 2 % top powder BID    midodrine tablet 10 mg Daily PRN    midodrine tablet 10 mg TID    naloxone 0.4 mg/mL injection 0.02 mg PRN    NORepinephrine 4 mg in dextrose 5% 250 mL infusion (premix) Continuous    ondansetron injection 4 mg Q6H PRN    simethicone chewable tablet 80 mg TID PRN    sodium chloride 0.9% flush 10 mL PRN    sodium chloride 0.9% flush 10 mL PRN    sodium phosphate 20.01 mmol in D5W 250 mL IVPB PRN    sodium phosphate 30 mmol in D5W 250 mL IVPB PRN    sodium phosphate 39.99 mmol in D5W 250 mL IVPB PRN    vancomycin 125 mg/5 mL oral solution 125 mg Q6H       Objective:     Vital Signs (Most Recent):  Temp: 97.9 °F (36.6 °C) (07/23/24 1101)  Pulse: 92 (07/23/24 1101)  Resp: (!) 22 (07/23/24 1101)  BP: 122/61 (07/23/24 1101)  SpO2: (!) 94 % (07/23/24 1101) Vital Signs (24h Range):  Temp:  [95.8 °F (35.4 °C)-98.4 °F (36.9 °C)] 97.9 °F (36.6 °C)  Pulse:  [] 92  Resp:  [13-28] 22  SpO2:  [91 %-97 %] 94 %  BP: ()/(47-75) 122/61     Weight: 128.5 kg (283 lb 4.7 oz) (07/17/24 1209)  Body mass index is 44.37 kg/m².  Body surface area is 2.46 meters squared.    I/O last 3 completed shifts:  In: 2509.5 [P.O.:490; I.V.:1993; IV Piggyback:26.5]  Out: 1239 [Other:1239]     Physical Exam  Constitutional:       General: She is not in acute distress.     Appearance: Normal appearance. She is obese.   HENT:      Head: Normocephalic and atraumatic.      Mouth/Throat:      Mouth: Mucous membranes are moist.      Pharynx: Oropharynx is clear.   Cardiovascular:      Rate and Rhythm: Normal rate and regular rhythm.      Heart sounds: Normal heart sounds.   Pulmonary:      Effort: Pulmonary effort is normal.      Breath sounds: Normal breath sounds.      Comments: Wearing nasal cannula  Abdominal:      Palpations: Abdomen is soft.   Musculoskeletal:      Right lower leg: Edema (2+) present.      Left lower leg: Edema (2+) present.       Comments: Small amount of weeping in lower extremities.    Skin:     General: Skin is warm and dry.   Neurological:      General: No focal deficit present.      Mental Status: She is alert. She is disoriented.      Motor: Weakness present.          Significant Labs:  All labs within the past 24 hours have been reviewed.     Significant Imaging:  All imaging with the past 24 hours have been reviewed.  Assessment/Plan:     Renal/  * ESRD (end stage renal disease)  ESRD - was briefly on PD then HD, having issues w tunnel cath and now w trialysis in place in [facility]. S/p R nephrectomy RCC  C diff positive - on oral vanc  HTN  DM  Afib w rvr  CHF w ischemic cardiomyopathy  S/p cardiac arrest     Plan/Recommendations  -SLED yesterday tolerated well for 6 hours. No dialysis today. On heparin drip.   - Edema 2-3+. If BP stays stable then tomorrow we will plan for a trial of HD.  -Recommend albumin 25 g q8h for hypoalbuminemia for volume expansion for the next 48 hrs  -Recommend doppler R LE due to pain out of proportion (although she is on AC)  - Planning for replacement of tunnel cath w interventional nephrology, however given elevated WBC (likely from c diff) would hold off on this for now. Improved WBC today to 18.   -FU on protein c and s labs  - Tunnel cath placed 7/11 - arterial port suctioning on vessel wall, trialysis placed.   - Acute resp failure - on supplemental O2  - Renally adjust medications  - Pre and Post-HD weights   - Continue to monitor intake and output      Thank you for your consult. I will follow-up with patient. Please contact us if you have any additional questions.    Paulo Alicea MD  Nephrology  [facility] - [facility]

## 2024-07-23 NOTE — PT/OT/SLP PROGRESS
Physical Therapy Treatment    Patient Name:  Juhi Taylor   MRN:  [id number]  Admit Date: 7/3/2024  Admitting Diagnosis:  ESRD (end stage renal disease)   Length of Stay: 20 days  Recent Surgery: Procedure(s) (LRB):  REMOVAL, CATHETER, DIALYSIS, PERITONEAL (N/A)  BRONCHOSCOPY, FLEXIBLE (N/A) 8 Days Post-Op    Recommendations:     Discharge Recommendations:  Moderate Intensity Therapy   Discharge Equipment Recommendations: bedside commode, wheelchair, walker, [place], hospital bed   Barriers to discharge:  Decreased caregiver support and increased need for skilled assistance    Plan:     During this hospitalization, patient to be seen 3 x/week to address the listed problems via gait training, therapeutic activities, therapeutic exercises, neuromuscular re-education  Plan of Care Expires:  08/12/24  Plan of Care Reviewed with: patient, sibling    Assessment:     Juhi Taylor is a 74 y.o. female admitted with a medical diagnosis of ESRD (end stage renal disease). Pt found alert, cooperative, and motivated to participate in today's session. Pt with improved transfer ability as noted by ability to successfully complete STS with 100% upright posture. Next visit to continue improving standing tolerance. Pt is a good candidate for PT and would benefit from skilled interventions to improve functional strength, endurance, and progress toward PLOF.      Problem List: weakness, impaired endurance, impaired functional mobility, impaired cardiopulmonary response to activity.  Rehab Prognosis: Good     GOALS:   Multidisciplinary Problems       Physical Therapy Goals          Problem: Physical Therapy    Goal Priority Disciplines Outcome Goal Variances Interventions   Physical Therapy Goal     PT, PT/OT Progressing     Description: PT goals to be met by: 8/15/24    Patient will perform rolling each way with minimum assistance  Patient will perform supine <> sitting with minimum assistance  Patient will perform sit <>  stand transitions with maximal assistance  Patient will perform transfers from bed <> chair or BSC with maximal assistance using LRAD  Patient will perform standing for 60 seconds with maximal assistance using RW or LRAD    DME Justifications (see above for complete DME recommendations)    Bedside Commode- Patient has a mobility limitation that significantly impairs their ability to participate in one or more mobility related activities of daily living, including toileting. This deficit can be resolved by using a bedside commode. Patient demonstrates mobility limitations that will cause them to be confined to one room at home without bathroom access for up to 30 days. Using a bedside commode will greatly improve the patient's ability to participate in MRADLs.     Rolling Walker- Patient demonstrates a mobility limitation that significantly impairs their ability to participate in one or more mobility related activities of daily living. Patient's mobility limitation cannot be sufficiently resolved with the use of a cane, but can be sufficiently resolved with the use of a rolling walker.The use of a rolling walker will considerably improve their ability to participate in MRADLs. Patient will use the walker on a regular basis at home.      Wheelchair-  Patient has a mobility limitation that significantly impairs their ability to participate in one or more mobility related activities of daily living in customary locations in the home. The mobility limitation cannot be sufficiently resolved by the use of a cane or walker. The use of a manual wheelchair will greatly improve the patient's ability to participate in MRADLs. The patient will use the wheelchair on a regular basis at home. They have expressed their willingness to use a manual wheelchair in the home, and have a caregiver who is available and willing to assist with the wheelchair if needed.     Hospital Bed ·       Patient requires a hospital bed for positioning  "of the body in ways that are not feasible with an ordinary bed. The patient requires special positioning for pain relief, limited mobility, and/or being unable to independently make changes in body position without the use of a hospital bed. Pillows and wedges will not be adequate for resolving these positional issues.                         Subjective   Communicated with RN prior to session.  Patient found HOB elevated upon PT entry to room, agreeable to evaluation. Juhi Taylor's sister present during session.    Patient/Family Comments/goals: to get better  Pain/Comfort:  Pain Rating 1: 0/10  Pain Rating 2: 0/10    Objective:   Patient found with: blood pressure cuff, telemetry, pulse ox (continuous), central line   General Precautions: Standard, Cardiac fall, special contact   Orthopedic Precautions:N/A   Braces: N/A   Oxygen Device: Room Air  Vitals: /61 (Patient Position: Lying)   Pulse 92   Temp 97.9 °F (36.6 °C) (Oral)   Resp (!) 22   Ht 5' 7" (1.702 m)   Wt 128.5 kg (283 lb 4.7 oz)   SpO2 (!) 94%   Breastfeeding No   BMI 44.37 kg/m²     Outcome Measures:  AM-PAC 6 CLICK MOBILITY  Turning over in bed (including adjusting bedclothes, sheets and blankets)?: 3  Sitting down on and standing up from a chair with arms (e.g., wheelchair, bedside commode, etc.): 2  Moving from lying on back to sitting on the side of the bed?: 2  Moving to and from a bed to a chair (including a wheelchair)?: 1  Need to walk in hospital room?: 1  Climbing 3-5 steps with a railing?: 1  Basic Mobility Total Score: 10       Functional Mobility:  Additional staff present: OT and Supervising PT  Bed Mobility:   Supine to Sit: maximal assistance and 2 persons; HOB elevated  Scooting anteriorly to EOB to have both feet planted on floor: moderate assistance and 2 persons  Sit to Supine: maximal assistance and 2 persons; HOB flat    Sitting Balance at Edge of Bed:  Assistance Level Required: Stand-by Assistance  Time: 20 " mins  Postural deviations noted: slouched posture and rounded shoulders  Comments:   Verbal cues to engage postural muscles to maintain upright posture      Transfers:   Sit <> Stand Transfer: maximal assistance and of 2 persons with no assistive device   1 trial from EOB   Facilitation of anterior weight shift  Pt able to achieve 100% upright posture      Therapeutic Activities, Exercises, and Education:   Education:  Pt educated on role of PT and POC  Pt educated on importance of OOB activity  Pt verbalized understanding    Therapeutic Exercise:  STS from EOB to improve functional LE strengthening      Patient left HOB elevated with all lines intact, call button in reach, and RN notified..    Time Tracking:     PT Received On: 07/23/24  PT Start Time: 1002     PT Stop Time: 1041  PT Total Time (min): 39 min     Billable Minutes:   Therapeutic Activity 30 and Therapeutic Exercise 8    Treatment Type: Treatment  PT/PTA: PT

## 2024-07-23 NOTE — ASSESSMENT & PLAN NOTE
--AFib with RVR appears new following cardiac arrest at OSH.   --Patient endorses amiodarone reaction with itching, rash, and oral burning. Refused Sotalol, DCCV, and AICD placement at OSH.   --Was placed on Diltizem infusion and transitioned to PO metoprolol. Amio gtt restarted overnight 7/11 s/t uncontrolled afib with RVR. Transitioned to PO amio.  --Patient's rate remained elevated despite amiodarone. Appeared to respond better to metoprolol.   --Continue Toprol 50 mg daily.   --Apixaban on hold. Anticoagulation with a heparin infusion.

## 2024-07-24 NOTE — PLAN OF CARE
MICU DAILY GOALS     Family/Goals of care/Code Status   Code Status: DNR    24H Vital Sign Range  Temp:  [97.2 °F (36.2 °C)-98 °F (36.7 °C)]   Pulse:  []   Resp:  [15-22]   BP: ()/()   SpO2:  [92 %-100 %]      Shift Events (include procedures and significant events)   No acute events throughout shift, Multiple loose BMs overnight.    AWAKE RASS: Goal - RASS Goal: 0-->alert and calm  Actual - RASS (Jha Agitation-Sedation Scale): alert and calm    Restraint necessity: Not necessary   BREATHE SBT: Not intubated    Coordinate A & B, analgesics/sedatives Pain: managed   SAT: Not intubated   Delirium CAM-ICU: Overall CAM-ICU: Negative   Early(intubated/ Progressive (non-intubated) Mobility MOVE Screen (INTUBATED ONLY): Not intubated    Activity: Activity Management: Rolling - L1   Feeding/Nutrition Diet order: Diet/Nutrition Received: NPO, low saturated fat/low cholesterol, Specialty Diet/Nutrition Received: renal diet   Thrombus DVT prophylaxis: VTE Required Core Measure: Pharmacological prophylaxis initiated/maintained   HOB Elevation Head of Bed (HOB) Positioning: HOB at 30 degrees   Ulcer Prophylaxis GI: yes   Glucose control managed Glycemic Management: blood glucose monitored   Skin Skin assessed during: Daily Assessment    Sacrum intact/not altered? No  Heels intact/not altered? Yes  Surgical wound? Yes    CHECK ONE!   (no altered skin or altered skin) and sub boxes:  [] No Altered Skin Integrity Present    []Prevention Measures Documented    [x] Altered Skin Integrity Present or Discovered   [x] LDA present in EPIC, daily doc completed              [] LDA added if not in EPIC (describe wound).                    When describing wound, do not stage, use descriptive words only.    [] Wound Image Taken (required on admit,                   transfer/discharge and every Tuesday)    Wound Care Consulted? No    4 EYES:  Attending Nurse (1st set of eyes):     Second RN/Staff Member (2nd set of  eyes):    Bowel Function diarrhea    Indwelling Catheter Necessity      [REMOVED] Trialysis (Dialysis) Catheter 07/08/24 2210 left internal jugular-Line Necessity Review: CRRT/HD       Hemodialysis Catheter 07/11/24 1219 left subclavian-Line Necessity Review: CRRT/HD  Trialysis (Dialysis) Catheter 07/20/24 1120 left femoral-Line Necessity Review: CRRT/HD     De-escalation Antibiotics Yes

## 2024-07-24 NOTE — PT/OT/SLP PROGRESS
Physical Therapy Co Treatment w/OT  Co-treatment performed due to patient's multiple deficits requiring two skilled therapists to appropriately and safely assess patient's strength and endurance while facilitating functional tasks in addition to accommodating for patient's activity tolerance.     Patient Name:  Juhi Taylor   MRN:  [id number]    Recommendations:     Discharge Recommendations: Moderate Intensity Therapy  Discharge Equipment Recommendations: bedside commode, walker, rolling, wheelchair, grab bar, hospital bed  Barriers to discharge: Decreased caregiver support and increased need for skilled assistance    Assessment:     Juhi Taylor is a 74 y.o. female admitted with a medical diagnosis of ESRD (end stage renal disease).  She presents with the following impairments/functional limitations: weakness, gait instability, impaired balance, impaired endurance, impaired functional mobility, impaired cardiopulmonary response to activity Pt was agreeable to therapy today and tolerated session well. Session was limited due to episodes of hypotension while sitting EOB and needed to return to supine. RN notified of hypotension and in the room to address.  Pt is significantly below previous functional level, increased risk of falls and increased burden of care currently. Pt would continue to benefit from skilled therapy 4x/wk and discharge to moderate intensity therapy once medically stable.     Rehab Prognosis: Good; patient would benefit from acute skilled PT services to address these deficits and reach maximum level of function.    Recent Surgery: Procedure(s) (LRB):  REMOVAL, CATHETER, DIALYSIS, PERITONEAL (N/A)  BRONCHOSCOPY, FLEXIBLE (N/A) 9 Days Post-Op    Plan:     During this hospitalization, patient to be seen 4 x/week to address the identified rehab impairments via gait training, therapeutic activities, therapeutic exercises, neuromuscular re-education and progress toward the following  "goals:    Plan of Care Expires:  08/12/24    Subjective     Chief Complaint: "You guys made me feel so much better thank you"  Patient/Family Comments/goals: to go home  Pain/Comfort:  Pain Rating 1: 0/10      Objective:     Communicated with RN prior to session.  Patient found HOB elevated with blood pressure cuff, pulse ox (continuous), telemetry, oxygen, Trialysis (L femoral dialysis and L subclavin dialysis access sites) upon PT entry to room.     General Precautions: Standard, special contact, fall  Orthopedic Precautions: N/A  Braces: N/A  Respiratory Status: Nasal cannula, flow 2 L/min     Functional Mobility:  Bed Mobility:   required cues for hand placement and movement of legs   Rolling Left:  moderate assistance and of 2 persons multiple trials due to changing of sheets  Rolling Right: moderate assistance and of 2 persons multiple trials due to changing of sheets  Scooting: maximal assistance and of 2 persons  Supine to Sit: total assistance and of 2 persons  Sit to Supine: total assistance and of 2 persons  Balance: sitting at EOB for 6 min with/OT to perform ADLs. Further sitting and transfers were unable to be completed due to pt becoming hypotensive.     Pt vitals were as followed:   - Starting supine  81/59 (66) 11:00 am (taken after patient had rolled many times with RN for cleaning)   - Sitting EOB 61/45 (50) 11:18 am   - Sitting EOB 71/40 (50) 11:19 am   - Supine 120/59 (76) 11:23 am   - Ending supine 77/52 (61) 11:30 am (taken after patient had rolled many times with RN and therapy team for cleaning)     White board was updated with therapist's names and level of assistance needed with nursing staff        AM-PAC 6 CLICK MOBILITY  Turning over in bed (including adjusting bedclothes, sheets and blankets)?: 2  Sitting down on and standing up from a chair with arms (e.g., wheelchair, bedside commode, etc.): 2  Moving from lying on back to sitting on the side of the bed?: 2  Moving to and from a bed " to a chair (including a wheelchair)?: 1  Need to walk in hospital room?: 1  Climbing 3-5 steps with a railing?: 1  Basic Mobility Total Score: 9       Treatment & Education:  Pt and daughter were given education on POC and both verbally agreed.     Patient left HOB elevated with all lines intact, call button in reach, RN notified, and daughter present..    GOALS:   Multidisciplinary Problems       Physical Therapy Goals          Problem: Physical Therapy    Goal Priority Disciplines Outcome Goal Variances Interventions   Physical Therapy Goal     PT, PT/OT Progressing     Description: PT goals to be met by: 8/15/24    Patient will perform rolling each way with minimum assistance  Patient will perform supine <> sitting with minimum assistance  Patient will perform sit <> stand transitions with maximal assistance  Patient will perform transfers from bed <> chair or BSC with maximal assistance using LRAD  Patient will perform standing for 60 seconds with maximal assistance using RW or LRAD    DME Justifications (see above for complete DME recommendations)    Bedside Commode- Patient has a mobility limitation that significantly impairs their ability to participate in one or more mobility related activities of daily living, including toileting. This deficit can be resolved by using a bedside commode. Patient demonstrates mobility limitations that will cause them to be confined to one room at home without bathroom access for up to 30 days. Using a bedside commode will greatly improve the patient's ability to participate in MRADLs.     Rolling Walker- Patient demonstrates a mobility limitation that significantly impairs their ability to participate in one or more mobility related activities of daily living. Patient's mobility limitation cannot be sufficiently resolved with the use of a cane, but can be sufficiently resolved with the use of a rolling walker.The use of a rolling walker will considerably improve their ability  to participate in MRADLs. Patient will use the walker on a regular basis at home.      Wheelchair-  Patient has a mobility limitation that significantly impairs their ability to participate in one or more mobility related activities of daily living in customary locations in the home. The mobility limitation cannot be sufficiently resolved by the use of a cane or walker. The use of a manual wheelchair will greatly improve the patient's ability to participate in MRADLs. The patient will use the wheelchair on a regular basis at home. They have expressed their willingness to use a manual wheelchair in the home, and have a caregiver who is available and willing to assist with the wheelchair if needed.     Hospital Bed ·       Patient requires a hospital bed for positioning of the body in ways that are not feasible with an ordinary bed. The patient requires special positioning for pain relief, limited mobility, and/or being unable to independently make changes in body position without the use of a hospital bed. Pillows and wedges will not be adequate for resolving these positional issues.                         Time Tracking:     PT Received On: 07/24/24  PT Start Time: 1054     PT Stop Time: 1136  PT Total Time (min): 42 min     Billable Minutes: Therapeutic Activity 32 min Neuromuscular re-education 10 min       PT/PTA: PT     Number of PTA visits since last PT visit: 0     07/24/2024

## 2024-07-24 NOTE — ASSESSMENT & PLAN NOTE
--PD catheter removed 07/15.   --Tunneled dialysis catheter is not functioning.   --Trialysis catheter placed in left femoral on 7/20.  --iHD trial today   --Start albumin 25 g q8h x 2 days per Nephrology.

## 2024-07-24 NOTE — PROGRESS NOTES
[facility] - Cardiac Medical ICU  Critical Care Medicine  Progress Note    Patient Name: Juhi Taylor  MRN: [id number]  Admission Date: 7/3/2024  Hospital Length of Stay: 21 days  Code Status: DNR  Attending Provider: Milind Monte MD  Primary Care Provider: Tony Sadler MD   Principal Problem: ESRD (end stage renal disease)    Subjective:     HPI:  Juhi Taylor is a 74-year-old female with a past medical history of CKD stage 5 with recent PD catheter placement on 6/4, RCC s/p right nephrectomy, T2DM, obesity, CAD s/p PCI to Lcx and LAD in 11/2020 and HFmREF who is admitted as a transfer from [facility] for higher level of care and further cardiac evaluation. Nephrology consulted for hemodialysis.     She initially presented on 6/18/24 to [facility] for hypervolemia in the setting of CKD 4-5 for which she had undergone elective PD catheter placement complicated by catheter site leaking. She had a complex hospital course, was initially placed on furosemide gtt without significant improvement and later underwent placement of tunneled line for HD with volume removal. During her second dialysis session she went into PEA arrest for which she was resuscitated, intubated and transferred to ICU. She was noted to have elevated troponin and subsequent TTE found reduced EF from 40-45% to 20-25% for which coronary angiography was recommended. Her hospital course was also complicated by dialysis line infection, significant bleeding around the catheter site, bradycardia and subsequent atrial fibrillation with RVR. Given the catheter site bleeding, anticoagulation was temporarily held. She has reported allergy to amiodarone, was initially placed on diltiazem for atrial fibrillation despite reduced EF. She discussed GOC at outside hospital where it appears that she lacked capacity per note and decided to pursue higher level of care at [facility]. At that time, General Surgery was planning to place a left tunneled catheter due  to concerns of the right side not being suitable     Upon admit, patient appeared drowsy but conversant. Daughter reports that she typically gets lethargic 2/2 to the volume overload due to not receiving dialysis. Patient reported b/l left lower extremity pain. Critical Care Medicine was consulted for emergent trialysis line placement and dialysis.     Ms. Taylor was admitted to MICU on 7/8/24 for urgent dialysis line placement and initiation of dialysis. Patient received short run of CRRT overnight without issue. Encephalopathy improving with continued CRRT treatments. She has had persistent AFib with RVR for which received Digoxin loading dose (renally dosed) and was restarted PO carvedilol. Patient remains in persistent AFib RVR despite therapeutic digoxin level and uptitration of carvedilol dosages so she was started on an amiodarone infusion. She was then transitioned to amio PO and started on heparin infusion for atrial fibrillation. General Surgery removed her peritoneal dialysis catheter on 7/15/24, but upon induction she vomited copious amounts of bile and was endotracheally intubated. She was extubated to nasal cannula on 7/16/24.     She has had difficulties with her tunneled HD line with sluggish flow especially through the venous side.  She would have a 4-hour instillation of tPA in each line before being able to run iHD, and even then it would be stopped before completion.  Interventional Nephrology plans to replace the line at some point next week.  Of note, Palliative Care was also consulted and patient had made her wishes known that she is a DNR code status.  She was still vacillating between replacing her tunneled HD line given her adverse event during her last procedure or going home with hospice.  The family was upset with her decision and April, her niece, asked that Palliative Care not return to see the patient.      Hospital/ICU Course:  Ms. Taylor was admitted to MICU on 7/8 for urgent  dialysis line placement and initiated of HD. Patient received short run of CRRT overnight without issue. Encephalopathy improving with continued CRRT treatments. Persistent AFib with RVR, received Digoxin loading dose (renally dosed), restarted PO Carvedilol. Patient remains in persistent AFib RVR despite therapeutic digoxin level and uptitration of Carvedilol levels, started on Amiodarone infusion overnight. Transitioned to amio PO, started on heparin gtt for afib AC. Tolerated HD well overnight without complications and less encephalopathic upon exam. Planning for gen surg to remove PD catheter. Upon induction pt vomited copious amounts of bile, was intubated. PD cathter removed successfully. Extubated to NC 7/16.     7/18:  Patient made DNR after Palliative Care discussions yesterday.  Tolerated SLED 12 hours overnight.  Discussed difficulties regarding HD line with Interventional Nephrology and plans to replace it next week if patient it amenable.  Patient was stepped down from ICU.  7/19:  Stepped back up to medical ICU with plans for overnight SLED x 3 days.    On 7/19 Nephrology requested that the patient be stepped back up to the medical ICU for SLED. A temporary trialysis HD catheter was placed on 7/20/24 in the groin. She also began having diarrhea, which tested positive for Clostridium difficile so she was started on PO vancomycin 7/22/24.  BP improved will have iHD trial 7/24 and then be ready for stepdown.      Interval History/Significant Events: No acute events overnight.  BP remains stable.  iHD trial this afternoon, then planning for stepdown.      Review of Systems   Respiratory:  Negative for shortness of breath.    Cardiovascular:  Negative for chest pain.   Gastrointestinal:  Positive for diarrhea.   Musculoskeletal:  Negative for myalgias.   All other systems reviewed and are negative.    Objective:     Vital Signs (Most Recent):  Temp: 98 °F (36.7 °C) (07/24/24 1101)  Pulse: 97 (07/24/24  1145)  Resp: (!) 21 (07/24/24 1145)  BP: (!) 81/59 (07/24/24 1101)  SpO2: 100 % (07/24/24 1145) Vital Signs (24h Range):  Temp:  [97.2 °F (36.2 °C)-98 °F (36.7 °C)] 98 °F (36.7 °C)  Pulse:  [] 97  Resp:  [15-23] 21  SpO2:  [90 %-100 %] 100 %  BP: ()/() 81/59   Weight: 128.5 kg (283 lb 4.7 oz)  Body mass index is 44.37 kg/m².      Intake/Output Summary (Last 24 hours) at 7/24/2024 1219  Last data filed at 7/24/2024 0600  Gross per 24 hour   Intake 422.3 ml   Output --   Net 422.3 ml          Physical Exam  Vitals and nursing note reviewed.   Constitutional:       General: She is not in acute distress.     Appearance: She is not ill-appearing, toxic-appearing or diaphoretic.   HENT:      Head: Normocephalic and atraumatic.      Right Ear: External ear normal.      Left Ear: External ear normal.      Nose: Nose normal.   Cardiovascular:      Rate and Rhythm: Tachycardia present. Rhythm irregular.      Pulses: Normal pulses.      Heart sounds: Normal heart sounds. No murmur heard.     No friction rub. No gallop.      Comments: No JVD but with 1+ pitting edema bilaterally  Pulmonary:      Effort: Pulmonary effort is normal. No respiratory distress.      Breath sounds: Normal breath sounds. No wheezing, rhonchi or rales.   Abdominal:      General: Abdomen is flat. Bowel sounds are normal. There is no distension.      Palpations: Abdomen is soft.      Tenderness: There is no abdominal tenderness. There is no guarding or rebound.   Musculoskeletal:         General: Swelling present. No tenderness. Normal range of motion.      Comments: Tender area just above the right antecubital area.   Skin:     General: Skin is warm and dry.      Coloration: Skin is not jaundiced or pale.   Neurological:      General: No focal deficit present.      Mental Status: She is alert and oriented to person, place, and time. Mental status is at baseline.   Psychiatric:         Mood and Affect: Mood normal.         Behavior:  "Behavior normal.         Thought Content: Thought content normal.         Judgment: Judgment normal.            Vents:  Vent Mode: Spont (07/16/24 1003)  Set Rate: 15 BPM (07/16/24 0903)  Vt Set: 420 mL (07/16/24 0903)  Pressure Support: 5 cmH20 (07/16/24 1003)  PEEP/CPAP: 5 cmH20 (07/16/24 1003)  Oxygen Concentration (%): 30 (07/24/24 0000)  Peak Airway Pressure: 10 cmH20 (07/16/24 1003)  Plateau Pressure: 0 cmH20 (07/16/24 1003)  Total Ve: 5.45 L/m (07/16/24 1003)  Negative Inspiratory Force (cm H2O): 0 (07/16/24 1003)  Lines/Drains/Airways       Central Venous Catheter Line  Duration                  Hemodialysis Catheter 07/11/24 1219 left subclavian 13 days    Trialysis (Dialysis) Catheter 07/20/24 1120 left femoral 4 days                  Significant Labs:    CBC/Anemia Profile:  Recent Labs   Lab 07/23/24  0402 07/24/24  0510   WBC 18.78*  18.78* 15.38*  15.38*   HGB 8.2*  8.2* 7.2*  7.2*   HCT 26.4*  26.4* 23.9*  23.9*     253 239  239   MCV 83  83 86  86   RDW 22.8*  22.8* 23.8*  23.8*        Chemistries:  Recent Labs   Lab 07/23/24  0402 07/23/24  1401 07/24/24  0017 07/24/24  0510    140 139 141   K 4.5 4.6 4.6 4.7   * 110 110 110   CO2 25 24 21* 21*   BUN 17 21 28* 30*   CREATININE 2.2* 2.6* 3.2* 3.4*   CALCIUM 7.6* 7.7* 8.2* 8.4*   ALBUMIN 1.3* 1.8* 2.1* 2.0*   PROT 4.0*  --   --  4.5*   BILITOT 0.8  --   --  0.9   ALKPHOS 159*  --   --  154*   ALT 5*  --   --  6*   AST 13  --   --  17   MG 2.1 2.1 2.2 2.2   PHOS 2.7 3.2 3.5 3.6       All pertinent labs within the past 24 hours have been reviewed.    Significant Imaging:  I have reviewed all pertinent imaging results/findings within the past 24 hours.    ABG  No results for input(s): "PH", "PO2", "PCO2", "HCO3", "BE" in the last 168 hours.  Assessment/Plan:     Cardiac/Vascular  Atrial fibrillation with RVR  --AFib with RVR appears new following cardiac arrest at OSH.   --Patient endorses amiodarone reaction with itching, " rash, and oral burning. Refused Sotalol, DCCV, and AICD placement at OSH.   --Was placed on Diltizem infusion and transitioned to PO metoprolol. Amio gtt restarted overnight 7/11 s/t uncontrolled afib with RVR. Transitioned to PO amio.  --Patient's rate remained elevated despite amiodarone. Appeared to respond better to metoprolol.   --Continue Toprol 50 mg daily.   --Apixaban on hold. Anticoagulation with a heparin infusion.      Acute on chronic combined systolic and diastolic heart failure  Echo    Left Ventricle: The left ventricle is moderately dilated. Normal wall thickness. Global hypokinesis present. There is severely reduced systolic function with a visually estimated ejection fraction of 20 - 25%.    Right Ventricle: Mild right ventricular enlargement. Wall thickness is normal. Right ventricle wall motion has global hypokinesis. Systolic function is moderately reduced.    Left Atrium: Left atrium is severely dilated.    Right Atrium: Right atrium is severely dilated.    Aortic Valve: The aortic valve is a trileaflet valve. There is mild aortic valve sclerosis.    Mitral Valve: There is mild regurgitation.    Tricuspid Valve: There is severe regurgitation.    Pulmonary Artery: There is moderate pulmonary hypertension. The estimated pulmonary artery systolic pressure is 61 mmHg.    IVC/SVC: Elevated venous pressure at 15 mmHg.    Pericardium: There is a trivial circumferential effusion. No indication of cardiac tamponade.    - Will require interventional cardiology for LHC vs PET for reduced EF once euvolemic  - LifeVest prior to discharge  - Entresto recommended by cardiology, but currently on hold due to hypotension.    Essential hypertension  --Takes carvedilol + clonidine at home.   --Hold antihypertensives due to hypotension.   --Continue metoprolol for rate control.     Renal/  * ESRD (end stage renal disease)  --PD catheter removed 07/15.   --Tunneled dialysis catheter is not functioning.    --Trialysis catheter placed in left femoral on 7/20.  --iHD trial today   --Start albumin 25 g q8h x 2 days per Nephrology.     ID  Clostridium difficile infection  --Diagnosed 7/22/24.  --PO vancomycin 125 mg q6h x 10 days. Stop date: 8/1/24.    Endocrine  Severe obesity (BMI >= 40)  --Morbid obesity complicating her medical care.    Type 2 diabetes mellitus with microalbuminuria, without long-term current use of insulin  --Latest A1C 6.8; takes metformin at home.   --Target -180. Within goal.   --Continue to monitor.     GI  Diarrhea  Etiology unclear.   Stool studies pending.   Low risk for c,diff as she has not had much in the way of antibiotics recently.  Because she is going to the OR on Tuesday, must r/o all infectious causes including c.diff.    Palliative Care  Palliative care encounter  - Palliative care following, continued GOC with family.     ACP (advance care planning)  --Palliative care consulted, appreciate their assistance in clarification of GOC/POC with patient and family.    Other  Debility  --PT/OT.      I have spent 35 min with this patient, with over 50% of this time spent coordinating care and speaking with the family.  Updated daughter at bedside, addressed questions/concerns.    Plan discussed with Dr. Monte.         Tasneem Ayers NP  Critical Care Medicine  [facility] - Cardiac Medical ICU

## 2024-07-24 NOTE — ASSESSMENT & PLAN NOTE
ESRD - was briefly on PD then HD, having issues w tunnel cath and now w trialysis in place in [facility]. S/p R nephrectomy RCC  C diff positive - on oral vanc  HTN  DM  Afib w rvr  CHF w ischemic cardiomyopathy  S/p cardiac arrest     Plan/Recommendations  - BP low yesterday afternoon, today more stable at 131/86. HD trial today for 3 hrs w femoral trialysis catheter. Will recommend to primary team to transfuse 1 unit prbc during dialysis for anemia. On heparin drip.   - Continue albumin 25 g q8h for hypoalbuminemia for volume expansion for another 24 hrs  - Planning for replacement of tunnel cath w interventional nephrology, however given elevated WBC (likely from c diff) would hold off on this for now. Improved WBC today to 15.  -FU on protein c and s labs  - Tunnel cath placed 7/11 - arterial port suctioning on vessel wall, trialysis placed.   - Acute resp failure - on supplemental O2  - Renally adjust medications  - Pre and Post-HD weights   - Continue to monitor intake and output

## 2024-07-24 NOTE — PROGRESS NOTES
[facility]  Nephrology  Progress Note    Patient Name: Juhi Taylor  MRN: [id number]  Admission Date: 7/3/2024  Hospital Length of Stay: 21 days  Attending Provider: Milind Monte MD   Primary Care Physician: Tony Sadler MD  Principal Problem:ESRD (end stage renal disease)    Subjective:     HPI: 74-year-old female with prior history of CKD stage 5 with recent PD catheter placement on 6/4, RCC s/p right nephrectomy, T2DM, obesity, CAD s/p PCI to Lcx and LAD in 11/2020 and HFmREF who is admitted as a transfer from [facility] for higher level of care and further cardiac evaluation. Nephrology consulted for hemodialysis.     She initially presented on 6/18/24 to [facility] for hypervolemia in the setting of CKD 4-5 for which she had undergone elective PD catheter placement complicated by catheter site leaking. She had a complex hospital course, was initially placed on furosemide gtt without significant improvement and later underwent placement of tunneled line for HD with volume removal. During her second dialysis session she went into PEA arrest for which she was resuscitated, intubated and transferred to ICU. She was noted to have elevated troponin and subsequent TTE found reduced EF from 40-45% to 20-25% for which coronary angiography was recommended. Her hospital course was also complicated by dialysis line infection, significant bleeding around the catheter site, bradycardia and subsequent atrial fibrillation with RVR. Given the catheter site bleeding, anticoagulation was temporarily held. She has reported allergy to amiodarone, was initially placed on diltiazem for atrial fibrillation despite reduced EF. She discussed GOC at outside hospital where it appears that she lacked capacity per note and decided to pursue higher level of care at [facility]. At that time, gen surgery was planning to place a left tunneled catheter due to concerns of the right side not being suitable    Upon admit,  patient appeared drowsy but conversant. Daughter reports that she typically gets lethargic 2/2 to the volume overload due to not receiving dialysis. Patient reported b/l left lower extremity pain. Daughter concerned about her not having dialysis in 4 days and wants a session today.     Interval History: Juhi is laying in bed this morning, still having diarrhea and not feeling well because of it.     Review of patient's allergies indicates:   Allergen Reactions    Percocet [oxycodone-acetaminophen] Itching    Januvia [sitagliptin]     Jardiance [empagliflozin]      Leg cramps    Lipitor [atorvastatin] Other (See Comments)     Severe leg pain    Linaclotide Other (See Comments) and Nausea And Vomiting     Does not remember    Lubiprostone Other (See Comments) and Palpitations     Does not remember     Current Facility-Administered Medications   Medication Frequency    0.9%  NaCl infusion (for blood administration) Q24H PRN    acetaminophen tablet 325 mg Q6H PRN    albumin human 25% bottle 25 g Q8H    dextromethorphan-guaiFENesin  mg/5 ml liquid 5 mL Q4H PRN    dextrose 10% bolus 125 mL 125 mL PRN    dextrose 10% bolus 250 mL 250 mL PRN    diphenhydrAMINE capsule 25 mg Q6H PRN    glucagon (human recombinant) injection 1 mg PRN    glucose chewable tablet 16 g PRN    glucose chewable tablet 24 g PRN    heparin 25,000 units in dextrose 5% (100 units/ml) IV bolus from bag LOW INTENSITY nomogram - [facility] PRN    heparin 25,000 units in dextrose 5% (100 units/ml) IV bolus from bag LOW INTENSITY nomogram - [facility] PRN    heparin 25,000 units in dextrose 5% 250 mL (100 units/mL) infusion LOW INTENSITY nomogram - [facility] Continuous    HYDROcodone-acetaminophen 5-325 mg per tablet 1 tablet Q8H PRN    insulin aspart U-100 pen 0-10 Units QID (AC + HS) PRN    LIDOcaine 5 % patch 2 patch Q24H    metoprolol injection 5 mg Q6H PRN    metoprolol succinate (TOPROL-XL) 24 hr tablet 50 mg Daily    miconazole NITRATE 2 % top powder BID     midodrine tablet 10 mg Daily PRN    midodrine tablet 10 mg TID    naloxone 0.4 mg/mL injection 0.02 mg PRN    ondansetron injection 4 mg Q6H PRN    simethicone chewable tablet 80 mg TID PRN    sodium chloride 0.9% flush 10 mL PRN    sodium chloride 0.9% flush 10 mL PRN    vancomycin 125 mg/5 mL oral solution 125 mg Q6H       Objective:     Vital Signs (Most Recent):  Temp: 97.8 °F (36.6 °C) (07/24/24 0701)  Pulse: 105 (07/24/24 0901)  Resp: 20 (07/24/24 0800)  BP: 131/86 (07/24/24 0901)  SpO2: 99 % (07/24/24 0901) Vital Signs (24h Range):  Temp:  [97.2 °F (36.2 °C)-98 °F (36.7 °C)] 97.8 °F (36.6 °C)  Pulse:  [] 105  Resp:  [15-22] 20  SpO2:  [90 %-100 %] 99 %  BP: ()/() 131/86     Weight: 128.5 kg (283 lb 4.7 oz) (07/17/24 1209)  Body mass index is 44.37 kg/m².  Body surface area is 2.46 meters squared.    I/O last 3 completed shifts:  In: 2305.5 [P.O.:120; I.V.:2185.5]  Out: 1239 [Other:1239]     Physical Exam  Constitutional:       General: She is not in acute distress.     Appearance: Normal appearance. She is obese.   HENT:      Head: Normocephalic and atraumatic.      Mouth/Throat:      Mouth: Mucous membranes are moist.      Pharynx: Oropharynx is clear.   Cardiovascular:      Rate and Rhythm: Normal rate and regular rhythm.      Heart sounds: Normal heart sounds.   Pulmonary:      Effort: Pulmonary effort is normal.      Breath sounds: Normal breath sounds.      Comments: Wearing nasal cannula  Abdominal:      Palpations: Abdomen is soft.   Musculoskeletal:      Right lower leg: Edema (2+) present.      Left lower leg: Edema (2+) present.      Comments: Small amount of weeping in lower extremities.    Skin:     General: Skin is warm and dry.   Neurological:      General: No focal deficit present.      Mental Status: She is alert. She is disoriented.      Motor: Weakness present.          Significant Labs:  All labs within the past 24 hours have been reviewed.     Significant Imaging:  All  imaging with the past 24 hours have been reviewed.  Assessment/Plan:     Renal/  * ESRD (end stage renal disease)  ESRD - was briefly on PD then HD, having issues w tunnel cath and now w trialysis in place in [facility]. S/p R nephrectomy RCC  C diff positive - on oral vanc  HTN  DM  Afib w rvr  CHF w ischemic cardiomyopathy  S/p cardiac arrest   C diff infection    Plan/Recommendations  - BP low yesterday afternoon, today more stable at 131/86. HD trial today for 3 hrs w femoral trialysis catheter. Will recommend to primary team to transfuse 1 unit prbc during dialysis for anemia. On heparin drip.   - Continue albumin 25 g q8h for hypoalbuminemia for volume expansion for another 24 hrs  - Oral vanc for c diff per primary  - Planning for replacement of tunnel cath w interventional nephrology, however given elevated WBC (likely from c diff) would hold off on this for now. Improved WBC today to 15.  -FU on protein c and s labs  - Tunnel cath placed 7/11 - arterial port suctioning on vessel wall, trialysis placed.   - Acute resp failure - on supplemental O2  - Renally adjust medications  - Pre and Post-HD weights   - Continue to monitor intake and output         Thank you for your consult. I will follow-up with patient. Please contact us if you have any additional questions.    Paulo Alicea MD  Nephrology  [facility]

## 2024-07-24 NOTE — PLAN OF CARE
Problem: Physical Therapy  Goal: Physical Therapy Goal  Description: PT goals to be met by: 8/15/24    Patient will perform rolling each way with minimum assistance  Patient will perform supine <> sitting with minimum assistance  Patient will perform sit <> stand transitions with maximal assistance  Patient will perform transfers from bed <> chair or BSC with maximal assistance using LRAD  Patient will perform standing for 60 seconds with maximal assistance using RW or LRAD    DME Justifications (see above for complete DME recommendations)    Bedside Commode- Patient has a mobility limitation that significantly impairs their ability to participate in one or more mobility related activities of daily living, including toileting. This deficit can be resolved by using a bedside commode. Patient demonstrates mobility limitations that will cause them to be confined to one room at home without bathroom access for up to 30 days. Using a bedside commode will greatly improve the patient's ability to participate in MRADLs.     Rolling Walker- Patient demonstrates a mobility limitation that significantly impairs their ability to participate in one or more mobility related activities of daily living. Patient's mobility limitation cannot be sufficiently resolved with the use of a cane, but can be sufficiently resolved with the use of a rolling walker.The use of a rolling walker will considerably improve their ability to participate in MRADLs. Patient will use the walker on a regular basis at home.      Wheelchair-  Patient has a mobility limitation that significantly impairs their ability to participate in one or more mobility related activities of daily living in customary locations in the home. The mobility limitation cannot be sufficiently resolved by the use of a cane or walker. The use of a manual wheelchair will greatly improve the patient's ability to participate in MRADLs. The patient will use the wheelchair on a  regular basis at home. They have expressed their willingness to use a manual wheelchair in the home, and have a caregiver who is available and willing to assist with the wheelchair if needed.     Hospital Bed ·       Patient requires a hospital bed for positioning of the body in ways that are not feasible with an ordinary bed. The patient requires special positioning for pain relief, limited mobility, and/or being unable to independently make changes in body position without the use of a hospital bed. Pillows and wedges will not be adequate for resolving these positional issues.    Outcome: Progressing     Goals were reviewed and remain appropriate 7/24/2024

## 2024-07-24 NOTE — SUBJECTIVE & OBJECTIVE
Interval History: Juhi is laying in bed this morning, still having diarrhea and not feeling well because of it.     Review of patient's allergies indicates:   Allergen Reactions    Percocet [oxycodone-acetaminophen] Itching    Januvia [sitagliptin]     Jardiance [empagliflozin]      Leg cramps    Lipitor [atorvastatin] Other (See Comments)     Severe leg pain    Linaclotide Other (See Comments) and Nausea And Vomiting     Does not remember    Lubiprostone Other (See Comments) and Palpitations     Does not remember     Current Facility-Administered Medications   Medication Frequency    0.9%  NaCl infusion (for blood administration) Q24H PRN    acetaminophen tablet 325 mg Q6H PRN    albumin human 25% bottle 25 g Q8H    dextromethorphan-guaiFENesin  mg/5 ml liquid 5 mL Q4H PRN    dextrose 10% bolus 125 mL 125 mL PRN    dextrose 10% bolus 250 mL 250 mL PRN    diphenhydrAMINE capsule 25 mg Q6H PRN    glucagon (human recombinant) injection 1 mg PRN    glucose chewable tablet 16 g PRN    glucose chewable tablet 24 g PRN    heparin 25,000 units in dextrose 5% (100 units/ml) IV bolus from bag LOW INTENSITY nomogram - [facility] PRN    heparin 25,000 units in dextrose 5% (100 units/ml) IV bolus from bag LOW INTENSITY nomogram - [facility] PRN    heparin 25,000 units in dextrose 5% 250 mL (100 units/mL) infusion LOW INTENSITY nomogram - [facility] Continuous    HYDROcodone-acetaminophen 5-325 mg per tablet 1 tablet Q8H PRN    insulin aspart U-100 pen 0-10 Units QID (AC + HS) PRN    LIDOcaine 5 % patch 2 patch Q24H    metoprolol injection 5 mg Q6H PRN    metoprolol succinate (TOPROL-XL) 24 hr tablet 50 mg Daily    miconazole NITRATE 2 % top powder BID    midodrine tablet 10 mg Daily PRN    midodrine tablet 10 mg TID    naloxone 0.4 mg/mL injection 0.02 mg PRN    ondansetron injection 4 mg Q6H PRN    simethicone chewable tablet 80 mg TID PRN    sodium chloride 0.9% flush 10 mL PRN    sodium chloride 0.9% flush 10 mL PRN    vancomycin 125  mg/5 mL oral solution 125 mg Q6H       Objective:     Vital Signs (Most Recent):  Temp: 97.8 °F (36.6 °C) (07/24/24 0701)  Pulse: 105 (07/24/24 0901)  Resp: 20 (07/24/24 0800)  BP: 131/86 (07/24/24 0901)  SpO2: 99 % (07/24/24 0901) Vital Signs (24h Range):  Temp:  [97.2 °F (36.2 °C)-98 °F (36.7 °C)] 97.8 °F (36.6 °C)  Pulse:  [] 105  Resp:  [15-22] 20  SpO2:  [90 %-100 %] 99 %  BP: ()/() 131/86     Weight: 128.5 kg (283 lb 4.7 oz) (07/17/24 1209)  Body mass index is 44.37 kg/m².  Body surface area is 2.46 meters squared.    I/O last 3 completed shifts:  In: 2305.5 [P.O.:120; I.V.:2185.5]  Out: 1239 [Other:1239]     Physical Exam  Constitutional:       General: She is not in acute distress.     Appearance: Normal appearance. She is obese.   HENT:      Head: Normocephalic and atraumatic.      Mouth/Throat:      Mouth: Mucous membranes are moist.      Pharynx: Oropharynx is clear.   Cardiovascular:      Rate and Rhythm: Normal rate and regular rhythm.      Heart sounds: Normal heart sounds.   Pulmonary:      Effort: Pulmonary effort is normal.      Breath sounds: Normal breath sounds.      Comments: Wearing nasal cannula  Abdominal:      Palpations: Abdomen is soft.   Musculoskeletal:      Right lower leg: Edema (2+) present.      Left lower leg: Edema (2+) present.      Comments: Small amount of weeping in lower extremities.    Skin:     General: Skin is warm and dry.   Neurological:      General: No focal deficit present.      Mental Status: She is alert. She is disoriented.      Motor: Weakness present.          Significant Labs:  All labs within the past 24 hours have been reviewed.     Significant Imaging:  All imaging with the past 24 hours have been reviewed.

## 2024-07-24 NOTE — SUBJECTIVE & OBJECTIVE
Interval History/Significant Events: No acute events overnight.  BP remains stable.  iHD trial this afternoon, then planning for stepdown.      Review of Systems   Respiratory:  Negative for shortness of breath.    Cardiovascular:  Negative for chest pain.   Gastrointestinal:  Positive for diarrhea.   Musculoskeletal:  Negative for myalgias.   All other systems reviewed and are negative.    Objective:     Vital Signs (Most Recent):  Temp: 98 °F (36.7 °C) (07/24/24 1101)  Pulse: 97 (07/24/24 1145)  Resp: (!) 21 (07/24/24 1145)  BP: (!) 81/59 (07/24/24 1101)  SpO2: 100 % (07/24/24 1145) Vital Signs (24h Range):  Temp:  [97.2 °F (36.2 °C)-98 °F (36.7 °C)] 98 °F (36.7 °C)  Pulse:  [] 97  Resp:  [15-23] 21  SpO2:  [90 %-100 %] 100 %  BP: ()/() 81/59   Weight: 128.5 kg (283 lb 4.7 oz)  Body mass index is 44.37 kg/m².      Intake/Output Summary (Last 24 hours) at 7/24/2024 1219  Last data filed at 7/24/2024 0600  Gross per 24 hour   Intake 422.3 ml   Output --   Net 422.3 ml          Physical Exam  Vitals and nursing note reviewed.   Constitutional:       General: She is not in acute distress.     Appearance: She is not ill-appearing, toxic-appearing or diaphoretic.   HENT:      Head: Normocephalic and atraumatic.      Right Ear: External ear normal.      Left Ear: External ear normal.      Nose: Nose normal.   Cardiovascular:      Rate and Rhythm: Tachycardia present. Rhythm irregular.      Pulses: Normal pulses.      Heart sounds: Normal heart sounds. No murmur heard.     No friction rub. No gallop.      Comments: No JVD but with 1+ pitting edema bilaterally  Pulmonary:      Effort: Pulmonary effort is normal. No respiratory distress.      Breath sounds: Normal breath sounds. No wheezing, rhonchi or rales.   Abdominal:      General: Abdomen is flat. Bowel sounds are normal. There is no distension.      Palpations: Abdomen is soft.      Tenderness: There is no abdominal tenderness. There is no guarding  or rebound.   Musculoskeletal:         General: Swelling present. No tenderness. Normal range of motion.      Comments: Tender area just above the right antecubital area.   Skin:     General: Skin is warm and dry.      Coloration: Skin is not jaundiced or pale.   Neurological:      General: No focal deficit present.      Mental Status: She is alert and oriented to person, place, and time. Mental status is at baseline.   Psychiatric:         Mood and Affect: Mood normal.         Behavior: Behavior normal.         Thought Content: Thought content normal.         Judgment: Judgment normal.            Vents:  Vent Mode: Spont (07/16/24 1003)  Set Rate: 15 BPM (07/16/24 0903)  Vt Set: 420 mL (07/16/24 0903)  Pressure Support: 5 cmH20 (07/16/24 1003)  PEEP/CPAP: 5 cmH20 (07/16/24 1003)  Oxygen Concentration (%): 30 (07/24/24 0000)  Peak Airway Pressure: 10 cmH20 (07/16/24 1003)  Plateau Pressure: 0 cmH20 (07/16/24 1003)  Total Ve: 5.45 L/m (07/16/24 1003)  Negative Inspiratory Force (cm H2O): 0 (07/16/24 1003)  Lines/Drains/Airways       Central Venous Catheter Line  Duration                  Hemodialysis Catheter 07/11/24 1219 left subclavian 13 days    Trialysis (Dialysis) Catheter 07/20/24 1120 left femoral 4 days                  Significant Labs:    CBC/Anemia Profile:  Recent Labs   Lab 07/23/24  0402 07/24/24  0510   WBC 18.78*  18.78* 15.38*  15.38*   HGB 8.2*  8.2* 7.2*  7.2*   HCT 26.4*  26.4* 23.9*  23.9*     253 239  239   MCV 83  83 86  86   RDW 22.8*  22.8* 23.8*  23.8*        Chemistries:  Recent Labs   Lab 07/23/24  0402 07/23/24  1401 07/24/24  0017 07/24/24  0510    140 139 141   K 4.5 4.6 4.6 4.7   * 110 110 110   CO2 25 24 21* 21*   BUN 17 21 28* 30*   CREATININE 2.2* 2.6* 3.2* 3.4*   CALCIUM 7.6* 7.7* 8.2* 8.4*   ALBUMIN 1.3* 1.8* 2.1* 2.0*   PROT 4.0*  --   --  4.5*   BILITOT 0.8  --   --  0.9   ALKPHOS 159*  --   --  154*   ALT 5*  --   --  6*   AST 13  --   --  17    MG 2.1 2.1 2.2 2.2   PHOS 2.7 3.2 3.5 3.6       All pertinent labs within the past 24 hours have been reviewed.    Significant Imaging:  I have reviewed all pertinent imaging results/findings within the past 24 hours.

## 2024-07-24 NOTE — PT/OT/SLP PROGRESS
Occupational Therapy   Co-Treatment    Name: Juhi Taylor  MRN: [id number]  Admitting Diagnosis:  ESRD (end stage renal disease)  9 Days Post-Op    Recommendations:     Discharge Recommendations: Moderate Intensity Therapy  Discharge Equipment Recommendations:  bedside commode, wheelchair, walker, rolling, bath bench, grab bar, hospital bed  Barriers to discharge:       Assessment:     Juhi Taylor is a 74 y.o. female with a medical diagnosis of ESRD (end stage renal disease).  Pt presents with decreased endurance and impaired mobility performance as limited by cardiovascular status and generalized weakness. Pt found upright in bed requiring toileting x2 during session today. Pt eager to mobilize with pt tolerating EOB ~6 minutes. Pt limited in additional mobility due to pt's blood pressure decreasing at bedside with MAP (~50) with RN aware.  At this time, pt is a high fall risk and not at their baseline. Prior to hospitalizations, pt was mod I for ADLs/mobility. Patient continues to demonstrate the need for moderate intensity therapy on a daily basis post acute exhibited by decreased independence with self-care and functional mobility     Performance deficits affecting function are weakness, gait instability, impaired balance, impaired endurance, decreased lower extremity function, decreased upper extremity function, decreased ROM, impaired cardiopulmonary response to activity, impaired self care skills, impaired functional mobility, decreased safety awareness.     Rehab Prognosis:  Good; patient would benefit from acute skilled OT services to address these deficits and reach maximum level of function.       Plan:     Patient to be seen 4 x/week to address the above listed problems via self-care/home management, therapeutic activities, therapeutic exercises, neuromuscular re-education  Plan of Care Expires: 08/16/24  Plan of Care Reviewed with: patient, daughter    Subjective     Chief Complaint:  None; pt asymptomatic at bedside, stating 'I wish we could've done more'  Patient/Family Comments/goals: return home   Pain/Comfort:  Pain Rating 1: 0/10  Pain Rating Post-Intervention 1: 0/10  Pain Rating Post-Intervention 2: 0/10    Objective:     Communicated with: RN prior to session.  Patient found HOB elevated with blood pressure cuff, pulse ox (continuous), telemetry, peripheral IV, oxygen, Trialysis upon OT entry to room.    General Precautions: Standard, fall, special contact    Orthopedic Precautions:N/A  Braces: N/A  Respiratory Status: Nasal cannula, flow 2 L/min     Occupational Performance:     Bed Mobility:  Completed rolling multiple times to each direction for toileting (x4)  Patient completed Rolling/Turning to Left with  moderate assistance and 2 persons  Patient completed Rolling/Turning to Right with moderate assistance and 2 persons  Patient completed Scooting/Bridging with total assistance and 2 persons  Patient completed Supine to Sit with total assistance and 2 persons  Patient completed Sit to Supine with total assistance and 2 persons     Functional Mobility/Transfers:  Functional Mobility: Pt sat EOB ~6 minutes with SBA for ADLs.    Activities of Daily Living:  Grooming: stand by assistance washing face and cleaning dentures (OT did A with denture care)  Toileting: total assistance as pt soiled x2 during session requiring pericare      AMPAC 6 Click ADL: 10    Treatment & Education:  Pt educated on role of occupational therapy, POC, and safety during ADLs and functional mobility. Pt and OT discussed importance of safe, continued mobility to optimize daily living skills. Pt verbalized understanding.     White board updated during session. Pt given instruction to call for medical staff/nurse for assistance.         Patient left HOB elevated with all lines intact, call button in reach, RN notified, and pt's daughter present    GOALS:   Multidisciplinary Problems       Occupational Therapy  Goals          Problem: Occupational Therapy    Goal Priority Disciplines Outcome Interventions   Occupational Therapy Goal     OT, PT/OT Progressing    Description: Goals to be met by: 08/04/24     Patient will increase functional independence with ADLs by performing:    UE Dressing with Modified [place].  LE Dressing with Minimal Assistance.  Grooming while standing at sink with Stand-by Assistance.  Toileting from bedside commode with Minimal Assistance for hygiene and clothing management.   Toilet transfer to bedside commode with Supervision.    DME:  Tub transfer bench is required for pt to return to t/s use with shower chair at present unsuitable with need for mobiltiy greater than pt can safely complete at present.     Patient has a mobility limitation that significantly impairs their ability to participate in one or more mobility related activities of daily living, including toileting. This deficit can be resolved by using a bedside commode. Patient demonstrates mobility limitations that will cause them to be confined to one room at home without bathroom access for up to 30 days. Using a bedside commode will greatly improve the patient's ability to participate in MRADLs.     Ambulation needs TBD on progress.                              Time Tracking:     OT Date of Treatment: 07/24/24  OT Start Time: 1054  OT Stop Time: 1136  OT Total Time (min): 42 min    Billable Minutes:Self Care/Home Management 30 min  Therapeutic Activity 12 min    OT/DAYSI: OT     Number of DAYSI visits since last OT visit: 1 7/24/2024

## 2024-07-24 NOTE — PROGRESS NOTES
"[facility]  Adult Nutrition  Progress Note    SUMMARY       Recommendations    Continue Renal diet; add Novasource ONS BID if PO intake declines.  RD to monitor & follow-up.    Goals: Meet % EEN, EPN by RD f/u date  Nutrition Goal Status: progressing towards goal  Communication of RD Recs: discussed on rounds    Assessment and Plan    Nutrition Problem:  Altered nutrition related lab values     Related to (etiology):   CKD    Signs and Symptoms (as evidenced by):   Elevated Creat, decreased GFR    Interventions(treatment strategy):  Collaboration of nutrition care w/ other providers     Nutrition Diagnosis Status:   New    Reason for Assessment    Reason For Assessment: RD follow-up  Diagnosis: other (see comments) (ESRD)  Relevant Medical History: CKD5, DM  Interdisciplinary Rounds: attended    General Information Comments: Pt continues to tolerate diet w/ fair appetite. Pt w/ no indicators of malnutrition. Planning for HD trial today.   Nutrition Discharge Planning: Adequate PO intake    Nutrition/Diet History    Spiritual, Cultural Beliefs, Restorationist Practices, Values that Affect Care: no  Food Allergies: NKFA  Factors Affecting Nutritional Intake: decreased appetite    Anthropometrics    Temp: 98 °F (36.7 °C)  Height: 5' 7" (170.2 cm)  Height (inches): 67 in  Weight Method: Bed Scale  Weight: 128.5 kg (283 lb 4.7 oz)  Weight (lb): 283.29 lb  Ideal Body Weight (IBW), Female: 135 lb  % Ideal Body Weight, Female (lb): 209.84 %  BMI (Calculated): 44.4  BMI Grade: greater than 40 - morbid obesity    Lab/Procedures/Meds    Pertinent Labs Reviewed: reviewed  Pertinent Labs Comments: Creat 3.4, GFR 13.8, A1C 6.8  Pertinent Medications Reviewed: reviewed  Pertinent Medications Comments: Heparin    Estimated/Assessed Needs    Weight Used For Calorie Calculations: 128.5 kg (283 lb 4.7 oz)    Energy Calorie Requirements (kcal): 1828 kcal/d  Energy Need Method: Bracken-St Jeor (1.0 PAL)    Protein " Requirements: 129 g/d (1 g/kg)  Weight Used For Protein Calculations: 128.5 kg (283 lb 4.7 oz)    Estimated Fluid Requirement Method: other (see comments) (Per MD)  RDA Method (mL): 1828    Nutrition Prescription Ordered    Current Diet Order: Renal    Evaluation of Received Nutrient/Fluid Intake    I/O: +2.1L since 7/10    Comments: LBM: 7/24    Tolerance: tolerating    Nutrition Risk    Level of Risk/Frequency of Follow-up:  (1x/week)     Monitor and Evaluation    Food and Nutrient Intake: food and beverage intake, energy intake  Food and Nutrient Adminstration: diet order  Physical Activity and Function: nutrition-related ADLs and IADLs  Anthropometric Measurements: weight, weight change  Biochemical Data, Medical Tests and Procedures: glucose/endocrine profile, inflammatory profile, lipid profile, gastrointestinal profile, electrolyte and renal panel  Nutrition-Focused Physical Findings: overall appearance     Nutrition Follow-Up    RD Follow-up?: Yes

## 2024-07-24 NOTE — PROGRESS NOTES
Arrived patient's room for bedside HD TX.  Received report from primary nurse.  Vital signs stable.  HD TX started via left femoral HD catheter.

## 2024-07-25 NOTE — PT/OT/SLP PROGRESS
Physical Therapy Co Treatment w/OT    Due to pt complex medical condition, the skill of 2 licensed therapists is needed to maximize treatment session and progression towards goals      Patient Name:  Juhi Taylor   MRN:  [id number]    Recommendations:     Discharge Recommendations: Moderate Intensity Therapy  Discharge Equipment Recommendations: bedside commode, wheelchair, walker, rolling, bath bench, grab bar, hospital bed  Barriers to discharge: Decreased caregiver support and increased need for skilled assistance    Assessment:     Juhi Taylor is a 74 y.o. female admitted with a medical diagnosis of ESRD (end stage renal disease).  She presents with the following impairments/functional limitations: weakness, impaired endurance, impaired functional mobility, gait instability, impaired balance, decreased upper extremity function, decreased lower extremity function, decreased safety awareness, pain, impaired cardiopulmonary response to activity Pt was agreeable to therapy today and tolerated the session well. The session was limited due to pt's fatigue and drowsiness from taking Benadryl. Pt's fatigue and drowsiness contributed to herincreased assist needed in session. VSS throughout session.  Pt is significantly below previous functional level, increased risk of falls and increased burden of care currently. Pt would continue to benefit from skilled therapy 4x/wk and once medically stable moderate intensity therapy.     Rehab Prognosis: Good; patient would benefit from acute skilled PT services to address these deficits and reach maximum level of function.    Recent Surgery: Procedure(s) (LRB):  REMOVAL, CATHETER, DIALYSIS, PERITONEAL (N/A)  BRONCHOSCOPY, FLEXIBLE (N/A) 10 Days Post-Op    Plan:     During this hospitalization, patient to be seen 4 x/week to address the identified rehab impairments via gait training, therapeutic activities, therapeutic exercises, neuromuscular re-education and  progress toward the following goals:    Plan of Care Expires:  08/12/24    Subjective     Chief Complaint: pain and tiredness   Patient/Family Comments/goals: to go home  Pain/Comfort:  Pain Rating 1: 7/10  Location - Side 1: Left  Location - Orientation 1: upper  Location 1: thigh  Pain Addressed 1: Reposition, Distraction      Objective:     Communicated with RN prior to session.  Patient found supine with blood pressure cuff, pulse ox (continuous), telemetry, peripheral IV, oxygen, Trialysis (NC 3L, L femoral dialysis port, L subclavian dialysis port) upon PT entry to room.     General Precautions: Standard, fall, special contact  Orthopedic Precautions: N/A  Braces: N/A  Respiratory Status: Nasal cannula, flow 3 L/min     Functional Mobility:  Bed Mobility:     Rolling Left:  total assistance and of 2 persons  Rolling Right: total assistance and of 2 persons  Scooting: total assistance and of 2 persons  Supine to Sit: total assistance and of 2 persons  Sit to Supine: total assistance and of 2 persons  Transfers:     Sit to Stand:  maximal assistance and of 2 persons with hand-held assist, achieved 50% of standing upright x2 trials from bed  Balance:   Static sitting EOB SBA   Dynamic sitting EOB CGA   Total time: 15 min VSS  Theraputic exercises   LAQ 10x each side     Pt white board updated with current therapists name and level of mobility assistance needed.       AM-PAC 6 CLICK MOBILITY  Turning over in bed (including adjusting bedclothes, sheets and blankets)?: 2  Sitting down on and standing up from a chair with arms (e.g., wheelchair, bedside commode, etc.): 2  Moving from lying on back to sitting on the side of the bed?: 2  Moving to and from a bed to a chair (including a wheelchair)?: 1  Need to walk in hospital room?: 1  Climbing 3-5 steps with a railing?: 1  Basic Mobility Total Score: 9       Treatment & Education:  Pt and family member were give education on POC and demonstrated verbal understanding.      Patient left HOB elevated with all lines intact, call button in reach, RN notified, family member present, and all questions and concerns about therapy answered ..    GOALS:   Multidisciplinary Problems       Physical Therapy Goals          Problem: Physical Therapy    Goal Priority Disciplines Outcome Goal Variances Interventions   Physical Therapy Goal     PT, PT/OT Progressing     Description: PT goals to be met by: 8/15/24    Patient will perform rolling each way with minimum assistance  Patient will perform supine <> sitting with minimum assistance  Patient will perform sit <> stand transitions with maximal assistance  Patient will perform transfers from bed <> chair or BSC with maximal assistance using LRAD  Patient will perform standing for 60 seconds with maximal assistance using RW or LRAD    DME Justifications (see above for complete DME recommendations)    Bedside Commode- Patient has a mobility limitation that significantly impairs their ability to participate in one or more mobility related activities of daily living, including toileting. This deficit can be resolved by using a bedside commode. Patient demonstrates mobility limitations that will cause them to be confined to one room at home without bathroom access for up to 30 days. Using a bedside commode will greatly improve the patient's ability to participate in MRADLs.     Rolling Walker- Patient demonstrates a mobility limitation that significantly impairs their ability to participate in one or more mobility related activities of daily living. Patient's mobility limitation cannot be sufficiently resolved with the use of a cane, but can be sufficiently resolved with the use of a rolling walker.The use of a rolling walker will considerably improve their ability to participate in MRADLs. Patient will use the walker on a regular basis at home.      Wheelchair-  Patient has a mobility limitation that significantly impairs their ability to  participate in one or more mobility related activities of daily living in customary locations in the home. The mobility limitation cannot be sufficiently resolved by the use of a cane or walker. The use of a manual wheelchair will greatly improve the patient's ability to participate in MRADLs. The patient will use the wheelchair on a regular basis at home. They have expressed their willingness to use a manual wheelchair in the home, and have a caregiver who is available and willing to assist with the wheelchair if needed.     Hospital Bed ·       Patient requires a hospital bed for positioning of the body in ways that are not feasible with an ordinary bed. The patient requires special positioning for pain relief, limited mobility, and/or being unable to independently make changes in body position without the use of a hospital bed. Pillows and wedges will not be adequate for resolving these positional issues.                         Time Tracking:     PT Received On: 07/25/24  PT Start Time: 1101     PT Stop Time: 1135  PT Total Time (min): 34 min     Billable Minutes: Therapeutic Activity 19 min and Neuromuscular Re-education 15 min       PT/PTA: PT     Number of PTA visits since last PT visit: 0     07/25/2024

## 2024-07-25 NOTE — PROGRESS NOTES
[facility]  Critical Care Medicine  Progress Note    Patient Name: Juhi Taylor  MRN: [id number]  Admission Date: 7/3/2024  Hospital Length of Stay: 22 days  Code Status: DNR  Attending Provider: Milind Monte MD  Primary Care Provider: Tony Sadler MD   Principal Problem: ESRD (end stage renal disease)    Subjective:     HPI:  Juhi Taylor is a 74-year-old female with a past medical history of CKD stage 5 with recent PD catheter placement on 6/4, RCC s/p right nephrectomy, T2DM, obesity, CAD s/p PCI to Lcx and LAD in 11/2020 and HFmREF who is admitted as a transfer from [facility] for higher level of care and further cardiac evaluation. Nephrology consulted for hemodialysis.     She initially presented on 6/18/24 to [facility] for hypervolemia in the setting of CKD 4-5 for which she had undergone elective PD catheter placement complicated by catheter site leaking. She had a complex hospital course, was initially placed on furosemide gtt without significant improvement and later underwent placement of tunneled line for HD with volume removal. During her second dialysis session she went into PEA arrest for which she was resuscitated, intubated and transferred to ICU. She was noted to have elevated troponin and subsequent TTE found reduced EF from 40-45% to 20-25% for which coronary angiography was recommended. Her hospital course was also complicated by dialysis line infection, significant bleeding around the catheter site, bradycardia and subsequent atrial fibrillation with RVR. Given the catheter site bleeding, anticoagulation was temporarily held. She has reported allergy to amiodarone, was initially placed on diltiazem for atrial fibrillation despite reduced EF. She discussed GOC at outside hospital where it appears that she lacked capacity per note and decided to pursue higher level of care at [facility]. At that time, General Surgery was planning to place a left tunneled catheter due  to concerns of the right side not being suitable     Upon admit, patient appeared drowsy but conversant. Daughter reports that she typically gets lethargic 2/2 to the volume overload due to not receiving dialysis. Patient reported b/l left lower extremity pain. Critical Care Medicine was consulted for emergent trialysis line placement and dialysis.     Ms. Taylor was admitted to MICU on 7/8/24 for urgent dialysis line placement and initiation of dialysis. Patient received short run of CRRT overnight without issue. Encephalopathy improving with continued CRRT treatments. She has had persistent AFib with RVR for which received Digoxin loading dose (renally dosed) and was restarted PO carvedilol. Patient remains in persistent AFib RVR despite therapeutic digoxin level and uptitration of carvedilol dosages so she was started on an amiodarone infusion. She was then transitioned to amio PO and started on heparin infusion for atrial fibrillation. General Surgery removed her peritoneal dialysis catheter on 7/15/24, but upon induction she vomited copious amounts of bile and was endotracheally intubated. She was extubated to nasal cannula on 7/16/24.     She has had difficulties with her tunneled HD line with sluggish flow especially through the venous side.  She would have a 4-hour instillation of tPA in each line before being able to run iHD, and even then it would be stopped before completion.  Interventional Nephrology plans to replace the line at some point next week.  Of note, Palliative Care was also consulted and patient had made her wishes known that she is a DNR code status.  She was still vacillating between replacing her tunneled HD line given her adverse event during her last procedure or going home with hospice.  The family was upset with her decision and April, her niece, asked that Palliative Care not return to see the patient.      Hospital/ICU Course:  Ms. Taylor was admitted to MICU on 7/8 for urgent  dialysis line placement and initiated of HD. Patient received short run of CRRT overnight without issue. Encephalopathy improving with continued CRRT treatments. Persistent AFib with RVR, received Digoxin loading dose (renally dosed), restarted PO Carvedilol. Patient remains in persistent AFib RVR despite therapeutic digoxin level and uptitration of Carvedilol levels, started on Amiodarone infusion overnight. Transitioned to amio PO, started on heparin gtt for afib AC. Tolerated HD well overnight without complications and less encephalopathic upon exam. Planning for gen surg to remove PD catheter. Upon induction pt vomited copious amounts of bile, was intubated. PD cathter removed successfully. Extubated to NC 7/16.     7/18:  Patient made DNR after Palliative Care discussions yesterday.  Tolerated SLED 12 hours overnight.  Discussed difficulties regarding HD line with Interventional Nephrology and plans to replace it next week if patient it amenable.  Patient was stepped down from ICU.  7/19:  Stepped back up to medical ICU with plans for overnight SLED x 3 days.    On 7/19 Nephrology requested that the patient be stepped back up to the medical ICU for SLED. A temporary trialysis HD catheter was placed on 7/20/24 in the groin. She also began having diarrhea, which tested positive for Clostridium difficile so she was started on PO vancomycin 7/22/24. HD trial on 7/24; remained HDS. Ready for stepdown.    Interval History/Significant Events: No acute events overnight.  Tolerated iHD trial, stable for stepdown.      Review of Systems   Respiratory:  Negative for shortness of breath.    Cardiovascular:  Negative for chest pain.   Gastrointestinal:  Positive for diarrhea.   Musculoskeletal:  Negative for myalgias.   All other systems reviewed and are negative.    Objective:     Vital Signs (Most Recent):  Temp: 97.7 °F (36.5 °C) (07/25/24 0800)  Pulse: 102 (07/25/24 1100)  Resp: 16 (07/25/24 0500)  BP: (!) 153/72  (07/25/24 1100)  SpO2: 100 % (07/25/24 1100) Vital Signs (24h Range):  Temp:  [97.2 °F (36.2 °C)-98.5 °F (36.9 °C)] 97.7 °F (36.5 °C)  Pulse:  [] 102  Resp:  [16-30] 16  SpO2:  [93 %-100 %] 100 %  BP: (112-167)/(59-84) 153/72   Weight: 128.5 kg (283 lb 4.7 oz)  Body mass index is 44.37 kg/m².      Intake/Output Summary (Last 24 hours) at 7/25/2024 1122  Last data filed at 7/25/2024 0705  Gross per 24 hour   Intake 1492.55 ml   Output 1900 ml   Net -407.45 ml          Physical Exam  Vitals and nursing note reviewed.   Constitutional:       General: She is not in acute distress.     Appearance: She is not ill-appearing, toxic-appearing or diaphoretic.   HENT:      Head: Normocephalic and atraumatic.      Right Ear: External ear normal.      Left Ear: External ear normal.      Nose: Nose normal.   Cardiovascular:      Rate and Rhythm: Tachycardia present. Rhythm irregular.      Pulses: Normal pulses.      Heart sounds: Normal heart sounds. No murmur heard.     No friction rub. No gallop.      Comments: No JVD but with 1+ pitting edema bilaterally  Pulmonary:      Effort: Pulmonary effort is normal. No respiratory distress.      Breath sounds: Normal breath sounds. No wheezing, rhonchi or rales.   Abdominal:      General: Abdomen is flat. Bowel sounds are normal. There is no distension.      Palpations: Abdomen is soft.      Tenderness: There is no abdominal tenderness. There is no guarding or rebound.   Musculoskeletal:         General: No tenderness. Normal range of motion.   Skin:     General: Skin is warm and dry.      Coloration: Skin is not jaundiced or pale.   Neurological:      General: No focal deficit present.      Mental Status: She is alert and oriented to person, place, and time. Mental status is at baseline.   Psychiatric:         Mood and Affect: Mood normal.         Behavior: Behavior normal.            Vents:  Vent Mode: Spont (07/16/24 1003)  Set Rate: 15 BPM (07/16/24 0903)  Vt Set: 420 mL  "(07/16/24 0903)  Pressure Support: 5 cmH20 (07/16/24 1003)  PEEP/CPAP: 5 cmH20 (07/16/24 1003)  Oxygen Concentration (%): 32 (07/25/24 0433)  Peak Airway Pressure: 10 cmH20 (07/16/24 1003)  Plateau Pressure: 0 cmH20 (07/16/24 1003)  Total Ve: 5.45 L/m (07/16/24 1003)  Negative Inspiratory Force (cm H2O): 0 (07/16/24 1003)  Lines/Drains/Airways       Central Venous Catheter Line  Duration                  Hemodialysis Catheter 07/11/24 1219 left subclavian 13 days    Trialysis (Dialysis) Catheter 07/20/24 1120 left femoral 5 days                  Significant Labs:    CBC/Anemia Profile:  Recent Labs   Lab 07/24/24  0510 07/25/24  0427   WBC 15.38*  15.38* 14.15*   HGB 7.2*  7.2* 8.0*   HCT 23.9*  23.9* 26.0*     239 97*   MCV 86  86 85   RDW 23.8*  23.8* 22.9*        Chemistries:  Recent Labs   Lab 07/24/24  0510 07/24/24  1406 07/24/24  2250 07/25/24  0427    140 140 139   K 4.7 4.5 4.4 4.4    110 110 111*   CO2 21* 23 20* 19*   BUN 30* 32* 24* 26*   CREATININE 3.4* 3.7* 3.1* 3.4*   CALCIUM 8.4* 8.6* 8.5* 8.7   ALBUMIN 2.0* 2.3* 2.7* 2.6*   PROT 4.5*  --   --   --    BILITOT 0.9  --   --   --    ALKPHOS 154*  --   --   --    ALT 6*  --   --   --    AST 17  --   --   --    MG 2.2 2.2 2.1 2.1   PHOS 3.6 3.9 3.3 3.6  3.6       All pertinent labs within the past 24 hours have been reviewed.    Significant Imaging:  I have reviewed all pertinent imaging results/findings within the past 24 hours.    ABG  No results for input(s): "PH", "PO2", "PCO2", "HCO3", "BE" in the last 168 hours.  Assessment/Plan:     Cardiac/Vascular  Atrial fibrillation with RVR  --AFib with RVR appears new following cardiac arrest at OSH.   --Patient endorses amiodarone reaction with itching, rash, and oral burning. Refused Sotalol, DCCV, and AICD placement at OSH.   --Was placed on Diltizem infusion and transitioned to PO metoprolol. Amio gtt restarted overnight 7/11 s/t uncontrolled afib with RVR. Transitioned to PO " amio.  --Patient's rate remained elevated despite amiodarone. Appeared to respond better to metoprolol.   --Continue Toprol 50 mg daily.   --Apixaban on hold. Anticoagulation with a heparin infusion.    Acute on chronic combined systolic and diastolic heart failure  Echo    Left Ventricle: The left ventricle is moderately dilated. Normal wall thickness. Global hypokinesis present. There is severely reduced systolic function with a visually estimated ejection fraction of 20 - 25%.    Right Ventricle: Mild right ventricular enlargement. Wall thickness is normal. Right ventricle wall motion has global hypokinesis. Systolic function is moderately reduced.    Left Atrium: Left atrium is severely dilated.    Right Atrium: Right atrium is severely dilated.    Aortic Valve: The aortic valve is a trileaflet valve. There is mild aortic valve sclerosis.    Mitral Valve: There is mild regurgitation.    Tricuspid Valve: There is severe regurgitation.    Pulmonary Artery: There is moderate pulmonary hypertension. The estimated pulmonary artery systolic pressure is 61 mmHg.    IVC/SVC: Elevated venous pressure at 15 mmHg.    Pericardium: There is a trivial circumferential effusion. No indication of cardiac tamponade.    - Will require interventional cardiology for LHC vs PET for reduced EF once euvolemic  - LifeVest prior to discharge  - Entresto recommended by cardiology, but currently on hold due to hypotension.    Essential hypertension  --Takes carvedilol + clonidine at home.   --Hold antihypertensives due to hypotension.   --Continue metoprolol for rate control.     Renal/  * ESRD (end stage renal disease)  --PD catheter removed 07/15.   --Tunneled dialysis catheter is not functioning.   --Trialysis catheter placed in left femoral on 7/20.  --Tolerating iHD  --Start albumin 25 g q8h x 3 days per Nephrology, ending 7/25     ID  Clostridium difficile infection  --Diagnosed 7/22/24.  --PO vancomycin 125 mg q6h x 10 days. Stop  date: 8/1/24.    Endocrine  Severe obesity (BMI >= 40)  --Morbid obesity complicating her medical care.    Type 2 diabetes mellitus with microalbuminuria, without long-term current use of insulin  --Latest A1C 6.8; takes metformin at home.   --Target -180. Within goal.   --Continue to monitor.     GI  Diarrhea  +C.diff, continue PO vancomycin    Palliative Care  Palliative care encounter  - Palliative care following, continued GOC with family.     ACP (advance care planning)  --Palliative care consulted, appreciate their assistance in clarification of GOC/POC with patient and family.    Other  Debility  --PT/OT.      I have spent 35 min with this patient, with over 50% of this time spent coordinating care and speaking with the family, updated daughter at bedside.  Addressed all questions/concerns.      Plan discussed with Dr. Monte.       Tasneem Ayers NP  Critical Care Medicine  [facility]

## 2024-07-25 NOTE — PT/OT/SLP PROGRESS
Occupational Therapy   Treatment    Name: Juhi Taylor  MRN: [id number]  Admitting Diagnosis:  ESRD (end stage renal disease)  10 Days Post-Op    Recommendations:     Discharge Recommendations: Moderate Intensity Therapy  Discharge Equipment Recommendations:  bedside commode, wheelchair, walker, rolling, bath bench, grab bar, lift device, hospital bed  Barriers to discharge:   (current level of assistance needed)    Assessment:     Juhi Taylor is a 74 y.o. female with a medical diagnosis of ESRD (end stage renal disease).  She presents lethargic from meds and requiring increased assistance. Performance deficits affecting function are weakness, gait instability, decreased upper extremity function, decreased lower extremity function, impaired balance, impaired endurance, impaired self care skills, impaired functional mobility, decreased coordination, pain, decreased safety awareness, impaired cardiopulmonary response to activity, impaired coordination, edema.     Rehab Prognosis:  Good; patient would benefit from acute skilled OT services to address these deficits and reach maximum level of function.       Plan:     Patient to be seen 4 x/week to address the above listed problems via self-care/home management, therapeutic activities, neuromuscular re-education, therapeutic exercises  Plan of Care Expires: 08/16/24  Plan of Care Reviewed with: patient, family    Subjective     Chief Complaint: fatigue and weakness  Patient/Family Comments/goals: to get better and go home  Pain/Comfort:  Pain Rating 1: 7/10  Location - Side 1: Left  Location - Orientation 1: upper  Location 1: thigh  Pain Addressed 1: Reposition, Distraction  Pain Rating Post-Intervention 1: 7/10    Objective:     Communicated with: RN prior to session.  Patient found supine with blood pressure cuff, pulse ox (continuous), telemetry, central line, peripheral IV, oxygen (L femoral trialysis) upon OT entry to room.    General Precautions:  Standard, fall, special contact    Orthopedic Precautions:N/A  Braces: N/A  Respiratory Status: Nasal cannula, flow 3 L/min     Occupational Performance:     Bed Mobility:    Patient completed Rolling/Turning to Left with  total assistance and 2 persons  Patient completed Rolling/Turning to Right with total assistance and 2 persons  Patient completed Scooting/Bridging with total assistance and 2 persons  Patient completed Supine to Sit with total assistance and 2 persons  Patient completed Sit to Supine with total assistance and 2 persons     Functional Mobility/Transfers:  Patient completed Sit <> Stand Transfer with maximal assistance and of 2 persons  with  hand-held assist  x 2 trials from EOB achieving ~50% upright posture  Functional Mobility: NT    Activities of Daily Living:  Grooming: minimum assistance    Lower Body Dressing: total assistance    Toileting: total assistance        AMPAC 6 Click ADL: 11    Treatment & Education:  Pt ed on OT POC  Daily orientation reviewed  Pt sat EOB x 15 min with SBA for static sitting and CGA during dynamic tasks  Pt completed grooming tasks and ROM ex's sitting EOB  Pt ed on ROM ex's daily for increased overall strength and endurance to reduce risk of hospital acquired weakness  Pt ed on safety with ADL and fall risk  Reinforced use of call button to contact nursing staff for assistance with mobility    Patient left supine with all lines intact, call button in reach, RN notified, and niece present    GOALS:   Multidisciplinary Problems       Occupational Therapy Goals          Problem: Occupational Therapy    Goal Priority Disciplines Outcome Interventions   Occupational Therapy Goal     OT, PT/OT Progressing    Description: Goals to be met by: 08/04/24     Patient will increase functional independence with ADLs by performing:    UE Dressing with Modified [place].  LE Dressing with Minimal Assistance.  Grooming while standing at sink with Stand-by  Assistance.  Toileting from bedside commode with Minimal Assistance for hygiene and clothing management.   Toilet transfer to bedside commode with Supervision.    DME:  Tub transfer bench is required for pt to return to t/s use with shower chair at present unsuitable with need for mobiltiy greater than pt can safely complete at present.     Patient has a mobility limitation that significantly impairs their ability to participate in one or more mobility related activities of daily living, including toileting. This deficit can be resolved by using a bedside commode. Patient demonstrates mobility limitations that will cause them to be confined to one room at home without bathroom access for up to 30 days. Using a bedside commode will greatly improve the patient's ability to participate in MRADLs.     Ambulation needs TBD on progress.                              Time Tracking:     OT Date of Treatment: 07/25/24  OT Start Time: 1101  OT Stop Time: 1135  OT Total Time (min): 34 min    Billable Minutes:Total Time 34          Number of DAYSI visits since last OT visit: 1    7/25/2024

## 2024-07-25 NOTE — MEDICARE RECERTIFICATION
MEDICARE INPATIENT  PHYSICIAN RECERTIFICATION  OF MEDICAL NECESSITY      1st Recertification (required no later than the 20th day of hospitalization)     I certify that this patient's continued medical needs require an Inpatient level of care due to Planning for replacement of tunneled cath with interventional nephrology when leukocytosis (likely caused by C. Diff infection) is improved,continue heparin infusion for afib,will require interventional cardiolgoy cx for LHC vs PET for reduced EF once euvolemic. Will require Lifevest upon discharge .  I estimate that the patient will be in the hospital for another 9 days.  Post-acute planning is in process, and currently the patient will likely be discharged to Inpatient rehab or home with family/home health

## 2024-07-25 NOTE — PROGRESS NOTES
[facility]  Nephrology  Progress Note    Patient Name: Juhi Taylor  MRN: [id number]  Admission Date: 7/3/2024  Hospital Length of Stay: 22 days  Attending Provider: Milind Monte MD   Primary Care Physician: Tony Sadler MD  Principal Problem:ESRD (end stage renal disease)    Subjective:     HPI: 74-year-old female with prior history of CKD stage 5 with recent PD catheter placement on 6/4, RCC s/p right nephrectomy, T2DM, obesity, CAD s/p PCI to Lcx and LAD in 11/2020 and HFmREF who is admitted as a transfer from [facility] for higher level of care and further cardiac evaluation. Nephrology consulted for hemodialysis.     She initially presented on 6/18/24 to [facility] for hypervolemia in the setting of CKD 4-5 for which she had undergone elective PD catheter placement complicated by catheter site leaking. She had a complex hospital course, was initially placed on furosemide gtt without significant improvement and later underwent placement of tunneled line for HD with volume removal. During her second dialysis session she went into PEA arrest for which she was resuscitated, intubated and transferred to ICU. She was noted to have elevated troponin and subsequent TTE found reduced EF from 40-45% to 20-25% for which coronary angiography was recommended. Her hospital course was also complicated by dialysis line infection, significant bleeding around the catheter site, bradycardia and subsequent atrial fibrillation with RVR. Given the catheter site bleeding, anticoagulation was temporarily held. She has reported allergy to amiodarone, was initially placed on diltiazem for atrial fibrillation despite reduced EF. She discussed GOC at outside hospital where it appears that she lacked capacity per note and decided [place] [facility] At that time, gen surgery was planning to place a left tunneled catheter due to concerns of the right side not being suitable    Upon admit,  patient appeared drowsy but conversant. Daughter reports that she typically gets lethargic 2/2 to the volume overload due to not receiving dialysis. Patient reported b/l left lower extremity pain. Daughter concerned about her not having dialysis in 4 days and wants a session today.     Interval History: Juhi is laying in bed this morning, family at bedside, family was concerned about how she was frequently getting blood clots.     Review of patient's allergies indicates:   Allergen Reactions    Percocet [oxycodone-acetaminophen] Itching    Januvia [sitagliptin]     Jardiance [empagliflozin]      Leg cramps    Lipitor [atorvastatin] Other (See Comments)     Severe leg pain    Linaclotide Other (See Comments) and Nausea And Vomiting     Does not remember    Lubiprostone Other (See Comments) and Palpitations     Does not remember     Current Facility-Administered Medications   Medication Frequency    0.9%  NaCl infusion (for blood administration) Q24H PRN    acetaminophen tablet 650 mg Q6H PRN    albumin human 25% bottle 25 g Q8H    dextromethorphan-guaiFENesin  mg/5 ml liquid 5 mL Q4H PRN    dextrose 10% bolus 125 mL 125 mL PRN    dextrose 10% bolus 250 mL 250 mL PRN    diphenhydrAMINE capsule 25 mg Q6H PRN    glucagon (human recombinant) injection 1 mg PRN    glucose chewable tablet 16 g PRN    glucose chewable tablet 24 g PRN    heparin 25,000 units in dextrose 5% (100 units/ml) IV bolus from bag LOW INTENSITY nomogram - [facility] PRN    heparin 25,000 units in dextrose 5% (100 units/ml) IV bolus from bag LOW INTENSITY nomogram - [facility] PRN    heparin 25,000 units in dextrose 5% 250 mL (100 units/mL) infusion LOW INTENSITY nomogram - [facility] Continuous    insulin aspart U-100 pen 0-10 Units QID (AC + HS) PRN    LIDOcaine 5 % patch 2 patch Q24H    metoprolol injection 5 mg Q6H PRN    metoprolol succinate (TOPROL-XL) 24 hr tablet 50 mg Daily    miconazole NITRATE 2 % top powder BID    midodrine tablet 10 mg Daily PRN     naloxone 0.4 mg/mL injection 0.02 mg PRN    ondansetron injection 4 mg Q6H PRN    simethicone chewable tablet 80 mg TID PRN    sodium chloride 0.9% flush 10 mL PRN    traMADoL tablet 50 mg Q8H PRN    vancomycin 125 mg/5 mL oral solution 125 mg Q6H       Objective:     Vital Signs (Most Recent):  Temp: 97.7 °F (36.5 °C) (07/25/24 0800)  Pulse: 102 (07/25/24 1100)  Resp: 16 (07/25/24 0500)  BP: (!) 153/72 (07/25/24 1100)  SpO2: 100 % (07/25/24 1100) Vital Signs (24h Range):  Temp:  [97.2 °F (36.2 °C)-98.5 °F (36.9 °C)] 97.7 °F (36.5 °C)  Pulse:  [] 102  Resp:  [16-30] 16  SpO2:  [93 %-100 %] 100 %  BP: (112-167)/(59-84) 153/72     Weight: 128.5 kg (283 lb 4.7 oz) (07/24/24 1247)  Body mass index is 44.37 kg/m².  Body surface area is 2.46 meters squared.    I/O last 3 completed shifts:  In: 1677 [I.V.:654; Blood:373; Other:650]  Out: 1900 [Other:1900]     Physical Exam  Constitutional:       General: She is not in acute distress.     Appearance: Normal appearance. She is obese.   HENT:      Head: Normocephalic and atraumatic.      Mouth/Throat:      Mouth: Mucous membranes are moist.      Pharynx: Oropharynx is clear.   Cardiovascular:      Rate and Rhythm: Normal rate and regular rhythm.      Heart sounds: Normal heart sounds.   Pulmonary:      Effort: Pulmonary effort is normal.      Breath sounds: Normal breath sounds.      Comments: Wearing nasal cannula  Abdominal:      Palpations: Abdomen is soft.   Musculoskeletal:      Right lower leg: Edema (2+) present.      Left lower leg: Edema (2+) present.      Comments: Small amount of weeping in lower extremities.    Skin:     General: Skin is warm and dry.   Neurological:      General: No focal deficit present.      Mental Status: She is alert. She is disoriented.      Motor: Weakness present.          Significant Labs:  All labs within the past 24 hours have been reviewed.     Significant Imaging:  All imaging with the past 24 hours have been  reviewed.  Assessment/Plan:     Renal/  * ESRD (end stage renal disease)  ESRD - was briefly on PD then HD, having issues w tunnel cath and now w trialysis in place in [facility]. S/p R nephrectomy RCC  C diff positive - on oral vanc  HTN  DM  Afib w rvr  CHF w ischemic cardiomyopathy  S/p cardiac arrest     Plan/Recommendations  -HD trial successful, will plan for HD again tomorrow. On heparin drip therapeutic AC.   -Albumin improved.   -Recheck iron studies, still having persistent anemia  -Planning for replacement of tunnel cath w interventional nephrology likely next week on Tuesday or Thursday w Dr Landa.   -FU on protein c and s labs  -Tunnel cath placed 7/11 - arterial port suctioning on vessel wall, trialysis placed.   -Renally adjust medications  -Pre and Post-HD weights   - Continue to monitor intake and output           Thank you for your consult. I will follow-up with patient. Please contact us if you have any additional questions.    Paulo Alicea MD  Nephrology  [facility] - [facility]

## 2024-07-25 NOTE — PLAN OF CARE
MICU DAILY GOALS     Family/Goals of care/Code Status   Code Status: DNR    24H Vital Sign Range  Temp:  [97.2 °F (36.2 °C)-98.5 °F (36.9 °C)]   Pulse:  []   Resp:  [16-30]   BP: ()/(59-86)   SpO2:  [93 %-100 %]      Shift Events (include procedures and significant events)   Multiple BMs overnight, remains therapeutic on continuous heparin gtt. Transfer orders placed to Hospital medicine once bed becomes available.      AWAKE RASS: Goal - RASS Goal: 0-->alert and calm  Actual - RASS (Jha Agitation-Sedation Scale): alert and calm    Restraint necessity: Not necessary   BREATHE SBT: Not intubated    Coordinate A & B, analgesics/sedatives Pain: managed   SAT: Not intubated   Delirium CAM-ICU: Overall CAM-ICU: Negative   Early(intubated/ Progressive (non-intubated) Mobility MOVE Screen (INTUBATED ONLY): Not intubated    Activity: Activity Management: Rolling - L1   Feeding/Nutrition Diet order: Diet/Nutrition Received: low saturated fat/low cholesterol, Specialty Diet/Nutrition Received: renal diet   Thrombus DVT prophylaxis: VTE Required Core Measure: Pharmacological prophylaxis initiated/maintained   HOB Elevation Head of Bed (HOB) Positioning: HOB at 30 degrees   Ulcer Prophylaxis GI: yes   Glucose control managed Glycemic Management: blood glucose monitored   Skin Skin assessed during: Daily Assessment    Sacrum intact/not altered? No  Heels intact/not altered? Yes  Surgical wound? No    CHECK ONE!   (no altered skin or altered skin) and sub boxes:  [] No Altered Skin Integrity Present    []Prevention Measures Documented    [] Altered Skin Integrity Present or Discovered   [x] LDA present in EPIC, daily doc completed              [] LDA added if not in EPIC (describe wound).                    When describing wound, do not stage, use descriptive words only.    [] Wound Image Taken (required on admit,                   transfer/discharge and every Tuesday)    Wound Care Consulted?following    4  EYES:  Attending Nurse (1st set of eyes):     Second RN/Staff Member (2nd set of eyes):    Bowel Function diarrhea    Indwelling Catheter Necessity      [REMOVED] Trialysis (Dialysis) Catheter 07/08/24 2210 left internal jugular-Line Necessity Review: CRRT/HD       Hemodialysis Catheter 07/11/24 1219 left subclavian-Line Necessity Review: CRRT/HD  Trialysis (Dialysis) Catheter 07/20/24 1120 left femoral-Line Necessity Review: CRRT/HD     De-escalation Antibiotics Yes

## 2024-07-25 NOTE — ASSESSMENT & PLAN NOTE
ESRD - was briefly on PD then HD, having issues w tunnel cath and now w trialysis in place in [facility]. S/p R nephrectomy RCC  C diff positive - on oral vanc  HTN  DM  Afib w rvr  CHF w ischemic cardiomyopathy  S/p cardiac arrest     Plan/Recommendations  -HD trial successful, will plan for HD again tomorrow. On heparin drip therapeutic AC.   -Albumin improved.   -Recheck iron studies, still having persistent anemia  -Planning for replacement of tunnel cath w interventional nephrology likely next week on Tuesday or Thursday w Dr Landa.   -FU on protein c and s labs  -Tunnel cath placed 7/11 - arterial port suctioning on vessel wall, trialysis placed.   -Renally adjust medications  -Pre and Post-HD weights   - Continue to monitor intake and output

## 2024-07-25 NOTE — CONSULTS
[facility] - Card[facility]  Wound Care    Patient Name:  Juhi Taylor   MRN:  [id number]  Date: 2024  Diagnosis: ESRD (end stage renal disease)    History:     Past Medical History:   Diagnosis Date    Anticoagulant long-term use     Arthritis     Breast cancer 2014    invasive lobular carcinoma    Cancer of kidney 2020    RIGHT KIDNEY CANCER    CHF (congestive heart failure)     Coronary artery disease dx     Depression     Diabetes mellitus     Diastolic heart failure secondary to hypertension     Gout     Hyperlipemia     Hypertension     Hypertrophy of nasal turbinates     Kidney mass     Right    Levoscoliosis     Lung nodule     left    Multiple thyroid nodules     NICOLE (obstructive sleep apnea)     uses C-PAP    Pulmonary hypertension     Severe sepsis 2024       Social History     Socioeconomic History    Marital status: Single    Number of children: 4   Occupational History    Occupation: retired Psych aid    Tobacco Use    Smoking status: Former     Current packs/day: 0.00     Types: Cigarettes     Start date: 2016     Quit date: 2016     Years since quittin.5    Smokeless tobacco: Never    Tobacco comments:     quit    Substance and Sexual Activity    Alcohol use: No    Drug use: No    Sexual activity: Not Currently     Partners: Male     Birth control/protection: None     Social Determinants of Health     Financial Resource Strain: Low Risk  (2024)    Overall Financial Resource Strain (CARDIA)     Difficulty of Paying Living Expenses: Not very hard   Recent Concern: Financial Resource Strain - Medium Risk (2024)    Overall Financial Resource Strain (CARDIA)     Difficulty of Paying Living Expenses: Somewhat hard   Food Insecurity: No Food Insecurity (2024)    Hunger Vital Sign     Worried About Running Out of Food in the Last Year: Never true     Ran Out of Food in the Last Year: Never true   Recent Concern: Food Insecurity - Food  Insecurity Present (6/20/2024)    Hunger Vital Sign     Worried About Running Out of Food in the Last Year: Sometimes true     Ran Out of Food in the Last Year: Never true   Transportation Needs: No Transportation Needs (7/5/2024)    TRANSPORTATION NEEDS     Transportation : No   Physical Activity: Inactive (7/5/2024)    Exercise Vital Sign     Days of Exercise per Week: 0 days     Minutes of Exercise per Session: 0 min   Stress: Patient Unable To Answer (7/5/2024)    [employer] - Occupational Stress Questionnaire     Feeling of Stress : Patient unable to answer   Housing Stability: Low Risk  (7/5/2024)    Housing Stability Vital Sign     Unable to Pay for Housing in the Last Year: No     Homeless in the Last Year: No       Precautions:     Allergies as of 07/02/2024 - Reviewed 06/23/2024   Allergen Reaction Noted    Percocet [oxycodone-acetaminophen] Itching 03/15/2022    Amiodarone analogues  01/16/2023    Irbesartan Swelling 12/11/2019    Januvia [sitagliptin]  06/28/2021    Jardiance [empagliflozin]  09/14/2020    Lipitor [atorvastatin] Other (See Comments) 12/08/2020    Linaclotide Other (See Comments) and Nausea And Vomiting 08/30/2017    Lubiprostone Other (See Comments) and Palpitations 08/30/2017       [facility] Assessment Details/Treatment     Patient seen for focused wound consult for right groin and right leg.  Intertrigo noted to groin, recommend triad twice a day.  Right shin with three ulcerations with moist yellow slough, recommend triad and foam dressing changes twice a week.  Patient's daughter at bedside, requested to look at lower abdominal wounds. Lower midline wound bled overnight and daughter stated nursing applied surgicel, dressing left in place, appears bleeding resolved.  Adjacent ulceration with adipose exposed, current orders in place for daily wet to dry, contacted Armen ORTEZ with critical care. NP approved altering orders to twice a week silver alginate  dressing changes. Nursing orders updated.     07/25/24 1139        Wound 07/25/24 1139 Intertrigo Right Groin   Date First Assessed/Time First Assessed: 07/25/24 1139   Primary Wound Type: Intertrigo  Side: Right  Location: Groin   Drainage Amount None   Drainage Characteristics/Odor No odor   Appearance Moist;Red   Tissue loss description Partial thickness   Red (%), Wound Tissue Color 100 %   Periwound Area Dry   Wound Edges Open   Wound Length (cm) 0.4 cm   Wound Width (cm) 2 cm   Wound Depth (cm) 0.1 cm   Wound Volume (cm^3) 0.08 cm^3   Wound Surface Area (cm^2) 0.8 cm^2        Wound 07/25/24 1139 Ulceration Right lower Shin   Date First Assessed/Time First Assessed: 07/25/24 1139   Primary Wound Type: Ulceration  Side: Right  Orientation: lower  Location: Shin   Drainage Amount None   Drainage Characteristics/Odor No odor   Appearance Moist;Yellow;Slough  (3 ulcerations with moist yellow slough, all approximately less than 0.5cm in diameter)   Periwound Area Intact   Wound Edges Undefined   Dressing   (triad and foam)   Dressing Change Due 07/29/24        Wound 07/25/24 1139 Ulceration lower Abdomen #1   Date First Assessed/Time First Assessed: 07/25/24 1139   Present on Original Admission: Yes  Primary Wound Type: Ulceration  Orientation: lower  Location: Abdomen  Wound Number: #1   Drainage Amount None   Drainage Characteristics/Odor No odor   Appearance Moist;Red;Adipose   Tissue loss description Full thickness   Red (%), Wound Tissue Color 100 %   Periwound Area Dry   Wound Edges Open   Wound Length (cm) 1 cm   Wound Width (cm) 3 cm   Wound Depth (cm) 0.5 cm   Wound Volume (cm^3) 1.5 cm^3   Wound Surface Area (cm^2) 3 cm^2        Wound 07/25/24 1139 Ulceration midline;lower Abdomen   Date First Assessed/Time First Assessed: 07/25/24 1139   Primary Wound Type: Ulceration  Orientation: midline;lower  Location: Abdomen   Dressing Appearance Dried drainage   Appearance Dressing in place, unable to visualize    [facility]

## 2024-07-25 NOTE — SUBJECTIVE & OBJECTIVE
Interval History/Significant Events: No acute events overnight.  Tolerated iHD trial, stable for stepdown.      Review of Systems   Respiratory:  Negative for shortness of breath.    Cardiovascular:  Negative for chest pain.   Gastrointestinal:  Positive for diarrhea.   Musculoskeletal:  Negative for myalgias.   All other systems reviewed and are negative.    Objective:     Vital Signs (Most Recent):  Temp: 97.7 °F (36.5 °C) (07/25/24 0800)  Pulse: 102 (07/25/24 1100)  Resp: 16 (07/25/24 0500)  BP: (!) 153/72 (07/25/24 1100)  SpO2: 100 % (07/25/24 1100) Vital Signs (24h Range):  Temp:  [97.2 °F (36.2 °C)-98.5 °F (36.9 °C)] 97.7 °F (36.5 °C)  Pulse:  [] 102  Resp:  [16-30] 16  SpO2:  [93 %-100 %] 100 %  BP: (112-167)/(59-84) 153/72   Weight: 128.5 kg (283 lb 4.7 oz)  Body mass index is 44.37 kg/m².      Intake/Output Summary (Last 24 hours) at 7/25/2024 1122  Last data filed at 7/25/2024 0705  Gross per 24 hour   Intake 1492.55 ml   Output 1900 ml   Net -407.45 ml          Physical Exam  Vitals and nursing note reviewed.   Constitutional:       General: She is not in acute distress.     Appearance: She is not ill-appearing, toxic-appearing or diaphoretic.   HENT:      Head: Normocephalic and atraumatic.      Right Ear: External ear normal.      Left Ear: External ear normal.      Nose: Nose normal.   Cardiovascular:      Rate and Rhythm: Tachycardia present. Rhythm irregular.      Pulses: Normal pulses.      Heart sounds: Normal heart sounds. No murmur heard.     No friction rub. No gallop.      Comments: No JVD but with 1+ pitting edema bilaterally  Pulmonary:      Effort: Pulmonary effort is normal. No respiratory distress.      Breath sounds: Normal breath sounds. No wheezing, rhonchi or rales.   Abdominal:      General: Abdomen is flat. Bowel sounds are normal. There is no distension.      Palpations: Abdomen is soft.      Tenderness: There is no abdominal tenderness. There is no guarding or rebound.    Musculoskeletal:         General: No tenderness. Normal range of motion.   Skin:     General: Skin is warm and dry.      Coloration: Skin is not jaundiced or pale.   Neurological:      General: No focal deficit present.      Mental Status: She is alert and oriented to person, place, and time. Mental status is at baseline.   Psychiatric:         Mood and Affect: Mood normal.         Behavior: Behavior normal.            Vents:  Vent Mode: Spont (07/16/24 1003)  Set Rate: 15 BPM (07/16/24 0903)  Vt Set: 420 mL (07/16/24 0903)  Pressure Support: 5 cmH20 (07/16/24 1003)  PEEP/CPAP: 5 cmH20 (07/16/24 1003)  Oxygen Concentration (%): 32 (07/25/24 0433)  Peak Airway Pressure: 10 cmH20 (07/16/24 1003)  Plateau Pressure: 0 cmH20 (07/16/24 1003)  Total Ve: 5.45 L/m (07/16/24 1003)  Negative Inspiratory Force (cm H2O): 0 (07/16/24 1003)  Lines/Drains/Airways       Central Venous Catheter Line  Duration                  Hemodialysis Catheter 07/11/24 1219 left subclavian 13 days    Trialysis (Dialysis) Catheter 07/20/24 1120 left femoral 5 days                  Significant Labs:    CBC/Anemia Profile:  Recent Labs   Lab 07/24/24  0510 07/25/24  0427   WBC 15.38*  15.38* 14.15*   HGB 7.2*  7.2* 8.0*   HCT 23.9*  23.9* 26.0*     239 97*   MCV 86  86 85   RDW 23.8*  23.8* 22.9*        Chemistries:  Recent Labs   Lab 07/24/24  0510 07/24/24  1406 07/24/24  2250 07/25/24  0427    140 140 139   K 4.7 4.5 4.4 4.4    110 110 111*   CO2 21* 23 20* 19*   BUN 30* 32* 24* 26*   CREATININE 3.4* 3.7* 3.1* 3.4*   CALCIUM 8.4* 8.6* 8.5* 8.7   ALBUMIN 2.0* 2.3* 2.7* 2.6*   PROT 4.5*  --   --   --    BILITOT 0.9  --   --   --    ALKPHOS 154*  --   --   --    ALT 6*  --   --   --    AST 17  --   --   --    MG 2.2 2.2 2.1 2.1   PHOS 3.6 3.9 3.3 3.6  3.6       All pertinent labs within the past 24 hours have been reviewed.    Significant Imaging:  I have reviewed all pertinent imaging results/findings within the past 24  hours.

## 2024-07-25 NOTE — PLAN OF CARE
NAVEEN spoke with kash Serrano [phone] at the bedside.   April stated that pt and family wants rehab vs SNF.   NAVEEN explained to April that therapy is recommending moderate, which is SNF.   Pt and April are on board with either rehab or snf.     April also stated that pt just started hd and its at [facility] [address] in [place].   April stated that pt doesn't have set HD days due to her just starting.    April stated that pt nor family wants palli or hospice at this time. April stated that is not a option.     April provided facilities pt and family would consider. ([facility])  NAVEEN will send referrals through care port.   Will follow up.     Verena Phillips MSW, CSW

## 2024-07-25 NOTE — PROVIDER TRANSFER
ICU Transfer of Care Note  Critical Care Medicine    Admit Date: 7/3/2024  LOS: 22    CC: ESRD (end stage renal disease)    Code Status: DNR         HPI and Hospital Course:     HPI:  Juhi Taylor is a 74-year-old female with a past medical history of CKD stage 5 with recent PD catheter placement on 6/4, RCC s/p right nephrectomy, T2DM, obesity, CAD s/p PCI to Lcx and LAD in 11/2020 and HFmREF who is admitted as a transfer from [facility] for higher level of care and further cardiac evaluation. Nephrology consulted for hemodialysis.     She initially presented on 6/18/24 to [facility] for hypervolemia in the setting of CKD 4-5 for which she had undergone elective PD catheter placement complicated by catheter site leaking. She had a complex hospital course, was initially placed on furosemide gtt without significant improvement and later underwent placement of tunneled line for HD with volume removal. During her second dialysis session she went into PEA arrest for which she was resuscitated, intubated and transferred to ICU. She was noted to have elevated troponin and subsequent TTE found reduced EF from 40-45% to 20-25% for which coronary angiography was recommended. Her hospital course was also complicated by dialysis line infection, significant bleeding around the catheter site, bradycardia and subsequent atrial fibrillation with RVR. Given the catheter site bleeding, anticoagulation was temporarily held. She has reported allergy to amiodarone, was initially placed on diltiazem for atrial fibrillation despite reduced EF. She discussed GOC at outside hospital where it appears that she lacked capacity per note and decided to pursue higher level of care at [facility]. At that time, General Surgery was planning to place a left tunneled catheter due to concerns of the right side not being suitable     Upon admit, patient appeared drowsy but conversant. Daughter reports that she typically gets lethargic 2/2 to the volume overload due  to not receiving dialysis. Patient reported b/l left lower extremity pain. Critical Care Medicine was consulted for emergent trialysis line placement and dialysis.     Ms. Taylor was admitted to MICU on 7/8/24 for urgent dialysis line placement and initiation of dialysis. Patient received short run of CRRT overnight without issue. Encephalopathy improving with continued CRRT treatments. She has had persistent AFib with RVR for which received Digoxin loading dose (renally dosed) and was restarted PO carvedilol. Patient remains in persistent AFib RVR despite therapeutic digoxin level and uptitration of carvedilol dosages so she was started on an amiodarone infusion. She was then transitioned to amio PO and started on heparin infusion for atrial fibrillation. General Surgery removed her peritoneal dialysis catheter on 7/15/24, but upon induction she vomited copious amounts of bile and was endotracheally intubated. She was extubated to nasal cannula on 7/16/24.     She has had difficulties with her tunneled HD line with sluggish flow especially through the venous side.  She would have a 4-hour instillation of tPA in each line before being able to run iHD, and even then it would be stopped before completion.  Interventional Nephrology plans to replace the line at some point next week.  Of note, Palliative Care was also consulted and patient had made her wishes known that she is a DNR code status.  She was still vacillating between replacing her tunneled HD line given her adverse event during her last procedure or going home with hospice.  The family was upset with her decision and April, her niece, asked that Palliative Care not return to see the patient.      Hospital/ICU Course:  Ms. Taylor was admitted to MICU on 7/8 for urgent dialysis line placement and initiated of HD. Patient received short run of CRRT overnight without issue. Encephalopathy improving with continued CRRT treatments. Persistent AFib with RVR,  received Digoxin loading dose (renally dosed), restarted PO Carvedilol. Patient remains in persistent AFib RVR despite therapeutic digoxin level and uptitration of Carvedilol levels, started on Amiodarone infusion overnight. Transitioned to amio PO, started on heparin gtt for afib AC. Tolerated HD well overnight without complications and less encephalopathic upon exam. Planning for gen surg to remove PD catheter. Upon induction pt vomited copious amounts of bile, was intubated. PD cathter removed successfully. Extubated to NC 7/16.     7/18:  Patient made DNR after Palliative Care discussions yesterday.  Tolerated SLED 12 hours overnight.  Discussed difficulties regarding HD line with Interventional Nephrology and plans to replace it next week if patient it amenable.  Patient was stepped down from ICU.  7/19:  Stepped back up to medical ICU with plans for overnight SLED x 3 days.    On 7/19 Nephrology requested that the patient be stepped back up to the medical ICU for SLED. A temporary trialysis HD catheter was placed on 7/20/24 in the groin. She also began having diarrhea, which tested positive for Clostridium difficile so she was started on PO vancomycin 7/22/24.  BP improved will have iHD trial 7/24 and then be ready for stepdown.        To Follow Up:     Received 3 hours of HD on 7/24 through femoral trialysis line; tolerated well and remained HDS,  Planning for replacement of tunneled cath with interventional nephrology when leukocytosis (likely caused by C. Diff infection) is improved,  Continue oral Vanc (stop date 8/1/24),  Remains on heparin infusion for afib management (takes eliquis at home); will need held prior to tunneled cath insertion,  Will require interventional cardiology consult for LHC vs PET for reduced EF once euvolemic,   Will also require Lifevest upon discharge.      Discharge Plan:     Likely will require skilled nursing vs. LTAC upon discharge.    Please call with questions.    Medina  [facility]  Pulmonary Critical Care  07/25/2024

## 2024-07-25 NOTE — CLINICAL REVIEW
Sepsis Screen (most recent)       Sepsis Screen (IP) - 07/25/24 1539       Is the patient's history or complaint suggestive of a possible infection? Yes  -    Are there at least two of the following signs and symptoms present? Yes  -    Sepsis signs/symptoms - Tachycardia Tachycardia     >90  -    Sepsis signs/symptoms - WBC WBC < 4,000 or WBC > 12,000  -    Sepsis signs/symptoms - Altered Mental Status Altered Mental Status  -    Are any of the following organ dysfunction criteria present and not considered to be due to a chronic condition? Yes   ESRD-iHD -    Organ Dysfunction Criteria Total Bilirubin > 2.0 Platelet count < 100,000  -    Organ Dysfunction Criteria INR > 1.5 or aPTT > 60  -    Initiate Sepsis Protocol No  -    Reason sepsis not considered Pt. receiving appropriate management  -              User Key  (r) = Recorded By, (t) = Taken By, (c) = Cosigned By      Initials Name    Pat Borrego RN

## 2024-07-25 NOTE — NURSING
Nurses Note -- 4 Eyes      7/25/2024   1:51 PM      Skin assessed during: Transfer      [] No Altered Skin Integrity Present    []Prevention Measures Documented      [x] Yes- Altered Skin Integrity Present or Discovered   [] LDA Added if Not in Epic (Describe Wound)   [] New Altered Skin Integrity was Present on Admit and Documented in LDA   [] Wound Image Taken    Wound Care Consulted? No    Attending Nurse:  NEENA Catherine     Second RN/Staff Member:  NEENA Forbes

## 2024-07-25 NOTE — ASSESSMENT & PLAN NOTE
--PD catheter removed 07/15.   --Tunneled dialysis catheter is not functioning.   --Trialysis catheter placed in left femoral on 7/20.  --Tolerating iHD  --Start albumin 25 g q8h x 3 days per Nephrology, ending 7/25

## 2024-07-25 NOTE — PLAN OF CARE
[facility] - Stepdown Flex ([place]-[facility])  Discharge Reassessment    Primary Care Provider: Tony Sadler MD    Expected Discharge Date: 7/29/2024    Reassessment (most recent)       Discharge Reassessment - 07/25/24 1527          Discharge Reassessment    Assessment Type Discharge Planning Reassessment     Did the patient's condition or plan change since previous assessment? Yes     Discharge Plan discussed with: --   Kris April    Communicated HARINI with patient/caregiver Date not available/Unable to determine     Discharge Plan A Skilled Nursing Facility     Discharge Plan B Rehab     DME Needed Upon Discharge  none     Transition of Care Barriers None     Why the patient remains in the hospital Requires continued medical care        Post-Acute Status    Post-Acute Authorization Dialysis;Placement     Post-Acute Placement Status Referrals Sent     Diaylsis Status Set-up Complete/Auth obtained     Coverage Medicare part A & B     Discharge Delays Procedure Scheduling (IR, OR, Labs, Echo, Cath, Echo, EEG)                       Discharge Plan A and Plan B have been determined by review of patient's clinical status, future medical and therapeutic needs, and coverage/benefits for post-acute care in coordination with multidisciplinary team members.    Verena Phillips MSW, CSW

## 2024-07-26 NOTE — NURSING
HD complete. UF 2L. Pt tolerated well. Heparin adjusted according to nomagram, now at 17u/kg/hr. Primary RN updated. Pt cleaned of 2 BMs in HD. Pt left [facility] via bed with transport.

## 2024-07-26 NOTE — NURSING
Pt to [facility] via bed with heparin gtt. Tx started via Lfem CVC. Some issues with BFR, running at 300. Labs drawn    1445- Aptt high, heparin gtt paused for full 15 minutes before redraw. Re draw sent to lab

## 2024-07-26 NOTE — PROGRESS NOTES
Adalberto Amato - Stepdown Formerly [facility] ([place] [address])  [facility] Medicine  Progress Note    Patient Name: Juhi Taylor  MRN: [id number]  Patient Class: IP- Inpatient   Admission Date: 7/3/2024  Length of Stay: 23 days  Attending Physician: Bubba Ochoa MD  Primary Care Provider: Tony Sadler MD        Subjective:     Principal Problem:ESRD (end stage renal disease)        HPI:  Juhi Taylor is a 74-year-old female with a past medical history of CKD stage 5 with recent PD catheter placement on 6/4, RCC s/p right nephrectomy, T2DM, obesity, CAD s/p PCI to Lcx and LAD in 11/2020 and HFmREF who is admitted as a transfer from [facility] for higher level of care and further cardiac evaluation. Nephrology consulted for hemodialysis. She initially presented on 6/18/24 to [facility] for hypervolemia in the setting of CKD 4-5 for which she had undergone elective PD catheter placement complicated by catheter site leaking. She had a complex hospital course, was initially placed on furosemide gtt without significant improvement and later underwent placement of tunneled line for HD with volume removal. During her second dialysis session she went into PEA arrest for which she was resuscitated, intubated and transferred to ICU. She was noted to have elevated troponin and subsequent TTE found reduced EF from 40-45% to 20-25% for which coronary angiography was recommended. Her hospital course was also complicated by dialysis line infection, significant bleeding around the catheter site, bradycardia and subsequent atrial fibrillation with RVR. Given the catheter site bleeding, anticoagulation was temporarily held. She has reported allergy to amiodarone, was initially placed on diltiazem for atrial fibrillation despite reduced EF. She discussed GOC at outside hospital where it appears that she lacked capacity per note and decided to pursue higher level of care at [facility] [place], [place]. At that time, gen surgery was planning to place a left tunneled  catheter due to concerns of the right side not being suitable     Upon admit, patient appeared drowsy but conversant. Daughter reports that she typically gets lethargic 2/2 to the volume overload due to not receiving dialysis. Patient reported b/l left lower extremity pain. Daughter concerned about her not having dialysis in 4 days and wants a session today.      Pt initially refusing dialysis. Seen by nephrology. VBG 7.220/33.4/34/13.7/-14. Critical care medicine consulted for emergent trialysis line placement and dialysis.     Overview/Hospital Course:  Ms. Taylor was admitted to MICU on 7/8 for urgent dialysis line placement and initiated of HD. Patient received short run of CRRT overnight without issue. Encephalopathy improving with continued CRRT treatments. Persistent AFib with RVR, received Digoxin loading dose (renally dosed), restarted PO Carvedilol. Patient remains in persistent AFib RVR despite therapeutic digoxin level and uptitration of Carvedilol levels, started on Amiodarone infusion overnight. Transitioned to amio PO, started on heparin gtt for afib AC. Tolerated HD well overnight without complications and less encephalopathic upon exam. Planning for gen surg to remove PD catheter. Upon induction pt vomited copious amounts of bile, was intubated. PD cathter removed successfully. Extubated to NC 7/16.      7/18:  Patient made DNR after Palliative Care discussions yesterday.  Tolerated SLED 12 hours overnight.  Discussed difficulties regarding HD line with Interventional Nephrology and plans to replace it next week if patient it amenable.  Patient was stepped down from ICU.  7/19:  Stepped back up to medical ICU with plans for overnight SLED x 3 days.     On 7/19 Nephrology requested that the patient be stepped back up to the medical ICU for SLED. A temporary trialysis HD catheter was placed on 7/20/24 in the groin. She also began having diarrhea, which tested positive for Clostridium difficile so  she was started on PO vancomycin 7/22/24. HD trial on 7/24; remained HDS. Ready for stepdown.    Interval History/Significant Events: GEOVANNA. Currently on 3L NC. Still c/o diarrhea, has slightly improved. Going for HD today.     Review of Systems   Respiratory:  Negative for shortness of breath.    Cardiovascular:  Negative for chest pain.   Gastrointestinal:  Positive for diarrhea.   Musculoskeletal:  Negative for myalgias.   All other systems reviewed and are negative.    Objective:     Vital Signs (Most Recent):  Temp: 98.6 °F (37 °C) (07/26/24 1315)  Pulse: 90 (07/26/24 1630)  Resp: 16 (07/26/24 1315)  BP: 128/78 (07/26/24 1630)  SpO2: 100 % (07/26/24 1630) Vital Signs (24h Range):  Temp:  [98.1 °F (36.7 °C)-98.8 °F (37.1 °C)] 98.6 °F (37 °C)  Pulse:  [] 90  Resp:  [16-24] 16  SpO2:  [98 %-100 %] 100 %  BP: (115-164)/() 128/78   Weight: 128.5 kg (283 lb 4.7 oz)  Body mass index is 44.37 kg/m².    No intake or output data in the 24 hours ending 07/26/24 1637         Physical Exam  Vitals and nursing note reviewed.   Constitutional:       General: She is not in acute distress.     Appearance: She is not ill-appearing, toxic-appearing or diaphoretic.   HENT:      Head: Normocephalic and atraumatic.      Right Ear: External ear normal.      Left Ear: External ear normal.      Nose: Nose normal.   Cardiovascular:      Rate and Rhythm: Tachycardia present. Rhythm irregular.      Pulses: Normal pulses.      Heart sounds: Normal heart sounds. No murmur heard.     No friction rub. No gallop.      Comments: No JVD but with 1+ pitting edema bilaterally  Pulmonary:      Effort: Pulmonary effort is normal. No respiratory distress.      Breath sounds: Normal breath sounds. No wheezing, rhonchi or rales.   Abdominal:      General: Abdomen is flat. Bowel sounds are normal. There is no distension.      Palpations: Abdomen is soft.      Tenderness: There is no abdominal tenderness. There is no guarding or rebound.    Musculoskeletal:         General: No tenderness. Normal range of motion.   Skin:     General: Skin is warm and dry.      Coloration: Skin is not jaundiced or pale.   Neurological:      General: No focal deficit present.      Mental Status: She is alert and oriented to person, place, and time. Mental status is at baseline.   Psychiatric:         Mood and Affect: Mood normal.         Behavior: Behavior normal.            Vents:  Vent Mode: Spont (07/16/24 1003)  Set Rate: 15 BPM (07/16/24 0903)  Vt Set: 420 mL (07/16/24 0903)  Pressure Support: 5 cmH20 (07/16/24 1003)  PEEP/CPAP: 5 cmH20 (07/16/24 1003)  Oxygen Concentration (%): 32 (07/25/24 1755)  Peak Airway Pressure: 10 cmH20 (07/16/24 1003)  Plateau Pressure: 0 cmH20 (07/16/24 1003)  Total Ve: 5.45 L/m (07/16/24 1003)  Negative Inspiratory Force (cm H2O): 0 (07/16/24 1003)  Lines/Drains/Airways       Central Venous Catheter Line  Duration                  Hemodialysis Catheter 07/11/24 1219 left subclavian 15 days    Trialysis (Dialysis) Catheter 07/20/24 1120 left femoral 6 days                  Significant Labs:    CBC/Anemia Profile:  Recent Labs   Lab 07/25/24  0427 07/25/24  1256 07/26/24  0612   WBC 14.15*  --  13.41*   HGB 8.0*  --  8.4*   HCT 26.0*  --  26.4*   PLT 97*  --  104*   MCV 85  --  87   RDW 22.9*  --  23.1*   IRON  --  17*  --    FERRITIN  --  156  --         Chemistries:  Recent Labs   Lab 07/24/24  2250 07/25/24  0427 07/26/24  0612    139 139   K 4.4 4.4 4.3    111* 110   CO2 20* 19* 18*   BUN 24* 26* 32*   CREATININE 3.1* 3.4* 4.5*   CALCIUM 8.5* 8.7 9.1   ALBUMIN 2.7* 2.6* 3.2*   MG 2.1 2.1 2.1   PHOS 3.3 3.6  3.6 3.8  3.8       All pertinent labs within the past 24 hours have been reviewed.    Significant Imaging:  I have reviewed all pertinent imaging results/findings within the past 24 hours.    Assessment/Plan:      * ESRD (end stage renal disease)  Pt previously on PD. Unable to remove enough volume with PD catheter.  Initially upgraded to MICU for emergent trialysis placement and CRRT. Now with tunneled dialysis catheter. Tolerated session of HD on 7/12 without complications.      - Nephrology following, appreciate recs  - Strict I/Os  - Renally dose meds  - Avoid nephrotoxic agents  - PD catheter removed 07/15  - Transferred to MICU for SLED  --Tunneled dialysis catheter is not functioning.   --Trialysis catheter placed in left femoral on 7/20.  --Tolerating iHD  --Received albumin 25 g q8h x 3 days per Nephrology, ending 7/25     Clostridium difficile infection  --Diagnosed 7/22/24.  --PO vancomycin 125 mg q6h x 10 days. Stop date: 8/1/24.      Diarrhea  +C.diff, continue PO vancomycin      Debility  PT/OT    Palliative care encounter  - Palliative care following, continued GOC with family.   - Patient has requested code status changed to DNR      ACP (advance care planning)  --Palliative care consulted, appreciate their assistance in clarification of GOC/POC with patient and family.      Atrial fibrillation with RVR  --AFib with RVR appears new following cardiac arrest at OSH.   --Patient endorses amiodarone reaction with itching, rash, and oral burning. Refused Sotalol, DCCV, and AICD placement at OSH.   --Was placed on Diltizem infusion and transitioned to PO metoprolol. Amio gtt restarted overnight 7/11 s/t uncontrolled afib with RVR. Transitioned to PO amio.  --Patient's rate remained elevated despite amiodarone. Appeared to respond better to metoprolol.   --Continue Toprol 50 mg daily.   --Apixaban on hold. Anticoagulation with a heparin infusion.      Acute on chronic combined systolic and diastolic heart failure  Echo    Left Ventricle: The left ventricle is moderately dilated. Normal wall thickness. Global hypokinesis present. There is severely reduced systolic function with a visually estimated ejection fraction of 20 - 25%.    Right Ventricle: Mild right ventricular enlargement. Wall thickness is normal. Right  ventricle wall motion has global hypokinesis. Systolic function is moderately reduced.    Left Atrium: Left atrium is severely dilated.    Right Atrium: Right atrium is severely dilated.    Aortic Valve: The aortic valve is a trileaflet valve. There is mild aortic valve sclerosis.    Mitral Valve: There is mild regurgitation.    Tricuspid Valve: There is severe regurgitation.    Pulmonary Artery: There is moderate pulmonary hypertension. The estimated pulmonary artery systolic pressure is 61 mmHg.    IVC/SVC: Elevated venous pressure at 15 mmHg.    Pericardium: There is a trivial circumferential effusion. No indication of cardiac tamponade.     - Will require interventional cardiology for LHC vs PET for reduced EF once euvolemic  - LifeVest prior to discharge  - Continue Entresto 24-26mg BID per cardiology recs, held for hypotension            Severe obesity (BMI >= 40)  Body mass index is 44.37 kg/m². Morbid obesity complicates all aspects of disease management from diagnostic modalities to treatment. Weight loss encouraged and health benefits explained to patient.         Type 2 diabetes mellitus with microalbuminuria, without long-term current use of insulin  --Latest A1C 6.8; takes metformin at home.   --Target -180. Within goal.   --Continue to monitor.       Essential hypertension  --Takes carvedilol + clonidine at home.   --Hold antihypertensives due to hypotension.   --Continue metoprolol for rate control.       VTE Risk Mitigation (From admission, onward)           Ordered     heparin 25,000 units in dextrose 5% (100 units/ml) IV bolus from bag LOW INTENSITY nomogram - [facility]  As needed (PRN)        Question:  Heparin Infusion Adjustment (DO NOT MODIFY ANSWER)  Answer:  [email or web address]\Images\Pharmacy\HeparinInfusions\heparin LOW INTENSITY nomogram for [facility] [id number]    07/21/24 1236     heparin 25,000 units in dextrose 5% (100 units/ml) IV bolus from bag LOW INTENSITY nomogram - [facility]  As needed (PRN)         Question:  Heparin Infusion Adjustment (DO NOT MODIFY ANSWER)  Answer:  [email or web address]\Images\Pharmacy\HeparinInfusions\heparin LOW INTENSITY nomogram for [facility] [id number]    07/21/24 1236     heparin 25,000 units in dextrose 5% 250 mL (100 units/mL) infusion LOW INTENSITY nomogram - [facility]  Continuous        Question:  Begin at (units/kg/hr)  Answer:  12    07/21/24 1236     heparin 25,000 units in dextrose 5% 250 mL (100 units/mL) infusion (heparin infusion - NO NOMOGRAM)  Continuous         07/20/24 1217     Place sequential compression device  Until discontinued         07/15/24 1330     IP VTE HIGH RISK PATIENT  Once         07/15/24 1330                    Discharge Planning   HARINI: 7/29/2024     Code Status: DNR   Is the patient medically ready for discharge?:     Reason for patient still in hospital (select all that apply): Treatment  Discharge Plan A: Skilled Nursing Facility   Discharge Delays: (!) Procedure Scheduling (IR, OR, Labs, Echo, Cath, Echo, EEG)              Bubba cOhoa MD  Department of Hospital Medicine   [facility] - Stepdown Flex ([place]-14)

## 2024-07-26 NOTE — ASSESSMENT & PLAN NOTE
Echo    Left Ventricle: The left ventricle is moderately dilated. Normal wall thickness. Global hypokinesis present. There is severely reduced systolic function with a visually estimated ejection fraction of 20 - 25%.    Right Ventricle: Mild right ventricular enlargement. Wall thickness is normal. Right ventricle wall motion has global hypokinesis. Systolic function is moderately reduced.    Left Atrium: Left atrium is severely dilated.    Right Atrium: Right atrium is severely dilated.    Aortic Valve: The aortic valve is a trileaflet valve. There is mild aortic valve sclerosis.    Mitral Valve: There is mild regurgitation.    Tricuspid Valve: There is severe regurgitation.    Pulmonary Artery: There is moderate pulmonary hypertension. The estimated pulmonary artery systolic pressure is 61 mmHg.    IVC/SVC: Elevated venous pressure at 15 mmHg.    Pericardium: There is a trivial circumferential effusion. No indication of cardiac tamponade.     - Will require interventional cardiology for LHC vs PET for reduced EF once euvolemic  - LifeVest prior to discharge  - Continue Entresto 24-26mg BID per cardiology recs, held for hypotension

## 2024-07-26 NOTE — ASSESSMENT & PLAN NOTE
--Latest A1C 6.8; takes metformin at home.   --Target -180. Within goal.   --Continue to monitor.

## 2024-07-26 NOTE — PROGRESS NOTES
VSS. Several liquid Bms today. Dialysis 3.5 hours. Patient resting at this time with needs met and safety measures in place.   07/26/24 1007   Pain/Comfort/Sleep   Pain Rating (0-10): Rest 0   Pain Rating: Rest 0 - no pain   FACES Pain Rating: Rest 0-->no hurt   FACES Pain Rating: Activity 0-->no hurt   rFLACC Pain Rating: - Face 0-->no particular expression or smile   rFLACC Pain Rating: - Legs 0-->normal position or relaxed   rFLACC Pain Rating: - Activity 0-->lying quietly, normal position, moves easily   rFLACC Pain Rating: - Cry 0-->no cry (awake or asleep)   rFLACC Pain Rating: - Consolability 0-->content, relaxed   rFLACC Score: 0   PAINAD Breathing 0-->normal   PAINAD Negative Vocalization 0-->none   PAINAD Facial Expression 0-->smiling or inexpressive   PAINAD Body Language 0-->relaxed   PAINAD Consolability 0-->no need to console   PAINAD Score 0   Cognitive   Cognitive/Neuro/Behavioral WDL ex   Level of Consciousness (AVPU) alert   Arousal Level opens eyes spontaneously   Orientation disoriented to;time   Speech clear/fluent;follows commands   Mood/Behavior calm;cooperative   Pupils   Pupil PERRLA yes   Pupil Size Left 3 mm   Pupil Shape Left round   Pupil Reaction Left brisk;equal   Pupil Accommodation Left normal response   Pupil Size Right 3 mm   Pupil Shape Right round   Pupil Reaction Right brisk;equal   Pupil Accommodation Right normal response   [place] Coma Scale   Best Eye Response 4-->(E4) spontaneous   Best Motor Response 6-->(M6) obeys commands   Best Verbal Response 4-->(V4) confused   Ciarra Coma Scale Score 14   Motor Response   Motor Response general motor response   General Motor Response purposeful motor response   HEENT   HEENT WDL WDL   Head Symptoms no swelling;no tenderness   Face Symptoms no swelling or tenderness;symmetrical at rest, with movement and expression   Respiratory   Respiratory WDL WDL   Rhythm/Pattern, Respiratory unlabored;no shortness of breath reported;chest wiggle  adequate;depth regular;pattern regular   Expansion/Accessory Muscles/Retractions no use of accessory muscles;no retractions   Nailbeds no discoloration   Breath Sounds   Breath Sounds All Fields   All Lung Fields Breath Sounds Anterior:;clear;diminished   Cardiac   Cardiac WDL WDL   Cardiac Rhythm apical pulse regular;radial pulse regular   Heart Sounds S1, S2   Peripheral Neurovascular   Peripheral Neurovascular WDL ex;edema   VTE Required Core Measure Pharmacological prophylaxis initiated/maintained   All Extremities Neurovascular Assessment   General All Extremity Temperature warm   General All Extremity Color no discoloration   General All Extremity Sensation no tingling;no numbness   Pulse Radial   Left Radial Pulse 1+ (weak)   Right Radial Pulse 1+ (weak)   Pulse Dorsalis Pedis   Left Dorsalis Pedis Pulse 1+ (weak)   Right Dorsalis Pedis Pulse 1+ (weak)   Pulse Posterior Tibial   Left Posterior Tibial Pulse 1+ (weak)   Right Posterior Tibial Pulse 1+ (weak)        Hemodialysis Catheter 07/11/24 1219 left subclavian   Placement Date/Time: 07/11/24 1219   Hand Hygiene: Performed  Barrier Precautions: Performed  Skin Antisepsis: ChloraPrep  Hemodialysis Catheter Type: Tunneled catheter  Location: left subclavian  Catheter Size (Fr): 14 Fr  Pressure Injectable Cathete...   Site Assessment Not assessed;No warmth;No swelling;No redness;No drainage   Line Securement Device Secured with sutureless device   Dressing Type CHG impregnated dressing/sponge   Dressing Status Clean;Dry;Intact   Dressing Intervention Integrity maintained   Trialysis (Dialysis) Catheter 07/20/24 1120 left femoral   Placement Date/Time: 07/20/24 1120   Present Prior to Hospital Arrival?: No  Hand Hygiene: Performed  Barrier Precautions: Performed  Skin Antisepsis: ChloraPrep  Location: left femoral  Insertion attempts (enter comment if more than 2 attempts): 2  G...   Site Assessment Not assessed;No warmth;No swelling;No drainage;No redness    Line Securement Device Secured with sutureless device   Dressing Type CHG impregnated dressing/sponge   Dressing Status Clean;Intact;Dry   Dressing Intervention Integrity maintained   Skin   Skin WDL WDL   Skin Color/Characteristics pale   Skin Temperature warm   Skin Moisture dry   Skin Elasticity firm   Skin Integrity bruised (ecchymotic)   Jus Risk Assessment   Sensory Perception 4-->no impairment   Moisture 3-->occasionally moist   Activity 1-->bedfast   Mobility 2-->very limited   Nutrition 2-->probably inadequate   Friction and Shear 1-->problem   Jus Score 13        Wound 07/20/24 0204 Abrasion(s) Left Elbow   Date First Assessed/Time First Assessed: 07/20/24 0204   Present on Original Admission: No  Primary Wound Type: Abrasion(s)  Side: Left  Location: Elbow  Is this injury device related?: No   Drainage Amount None   Drainage Characteristics/Odor No odor   Appearance Intact;Pink   Tissue loss description Partial thickness   Periwound Area Intact;Dry        Wound 07/20/24 0207 Abrasion(s) Right Elbow   Date First Assessed/Time First Assessed: 07/20/24 0207   Present on Original Admission: No  Primary Wound Type: Abrasion(s)  Side: Right  Location: Elbow   Appearance Intact   Tissue loss description Partial thickness        Wound 07/25/24 1139 Intertrigo Right Groin   Date First Assessed/Time First Assessed: 07/25/24 1139   Primary Wound Type: Intertrigo  Side: Right  Location: Groin   Appearance Moist;Red   Tissue loss description Partial thickness        Wound 07/25/24 1139 Ulceration Right lower Shin   Date First Assessed/Time First Assessed: 07/25/24 1139   Primary Wound Type: Ulceration  Side: Right  Orientation: lower  Location: Shin   Appearance Moist   Periwound Area Intact   Wound Edges Undefined        Wound 07/25/24 1139 Ulceration lower Abdomen #1   Date First Assessed/Time First Assessed: 07/25/24 1139   Present on Original Admission: Yes  Primary Wound Type: Ulceration  Orientation:  lower  Location: Abdomen  Wound Number: #1   Drainage Characteristics/Odor No odor   Appearance Red;Moist   Tissue loss description Full thickness        Wound 07/25/24 1139 Ulceration midline;lower Abdomen   Date First Assessed/Time First Assessed: 07/25/24 1139   Primary Wound Type: Ulceration  Orientation: midline;lower  Location: Abdomen   Dressing Appearance Dried drainage   Appearance Dressing in place, unable to visualize   Musculoskeletal   Musculoskeletal WDL WDL   General Mobility significantly impaired   Extremity Movement LLE;RLE   LUE Extremity Movement active ROM moderately impaired   Gastrointestinal   GI WDL WDL   Abdominal Appearance obese   Abdominal Palpation All Quadrants   All Quadrants Abdominal Palpation soft/nontender   Bowel Sounds All Quadrants   All Quadrants Bowel Sounds audible and normoactive   Stool Amount moderate   Stool Consistency loose;soft   Stool Color brown   GI Signs/Symptoms fecal incontinence   Last Bowel Movement 07/26/24   Genitourinary   Genitourinary WDL WDL   Voiding Characteristics anuria;patient on hemodialysis   Coping/Psychosocial   Observed Emotional State accepting;calm;cooperative   Verbalized Emotional State acceptance   Plan of Care Reviewed With patient;daughter   Safety   Safety WDL WDL   Safety Factors ID band on;upper side rails raised x 2;call light in reach;wheels locked;bed in low position   All Alarms alarm(s) activated and audible   Fall Risk Assessment (every shift)   History Of Fall (W/I 3 Mos) 0-->No   Polypharmacy 3-->Yes   Central Nervous System/Psychotropic Medication 0-->No   Cardiovascular Medication 3-->Yes   Age Greater Than 65 Years 2-->Yes   Altered Elimination 2-->Yes   Cognitive Deficit 2-->Yes   Sensory Deficit 0-->No   Dizziness/Vertigo 0-->No   Depression 0-->No   Mobility Deficit/Weakness 2-->Yes   Male 0-->No   Fall Risk Score 14   ABC Risk for Fall with Injury Assessment   A= Age: Is the patient greater than or equal to 85 years old  or frail due to clinical condition? No   B=Bones: Does the patient have osteoporosis, previous fracture, prolonged steroid use, or metastatic bone cancer? No   C=Coagulation Disorders: Does the patient have a bleeding disorder, either through anticoagulants or underlying clinical condition? Yes   S=recent Surgery: Is the patient post-op surgicalwith a recent lower limb amputation or recent major abdominal or thoracic surgery? No   Safety Management   Safety Promotion/Fall Prevention assistive device/personal item within reach;family to remain at bedside;family expresses understanding of fall risk and prevention;lighting adjusted;medications reviewed;nonskid shoes/socks when out of bed;patient expresses understanding of fall risk and prevention   Medication Review/Management medications reviewed   Patient Rounds bed in low position;bed wheels locked;call light in patient/parent reach;clutter free environment maintained;ID band on;placement of personal items at bedside;toileting offered;visualized patient   Daily Care   Activity Management Rolling - L1   Activity Assistance Provided assistance, 2 people   Mobility   GEMS ([place] Early Mobility Scale) Level 1-Primary in bed activities   Positioning   Body Position supine   Head of Bed (HOB) Positioning HOB elevated   Positioning/Transfer Devices pillows;in use

## 2024-07-26 NOTE — SUBJECTIVE & OBJECTIVE
Interval History/Significant Events: GEOVANNA. Currently on 3L NC. Still c/o diarrhea, has slightly improved. Going for HD today.     Review of Systems   Respiratory:  Negative for shortness of breath.    Cardiovascular:  Negative for chest pain.   Gastrointestinal:  Positive for diarrhea.   Musculoskeletal:  Negative for myalgias.   All other systems reviewed and are negative.    Objective:     Vital Signs (Most Recent):  Temp: 98.6 °F (37 °C) (07/26/24 1315)  Pulse: 90 (07/26/24 1630)  Resp: 16 (07/26/24 1315)  BP: 128/78 (07/26/24 1630)  SpO2: 100 % (07/26/24 1630) Vital Signs (24h Range):  Temp:  [98.1 °F (36.7 °C)-98.8 °F (37.1 °C)] 98.6 °F (37 °C)  Pulse:  [] 90  Resp:  [16-24] 16  SpO2:  [98 %-100 %] 100 %  BP: (115-164)/() 128/78   Weight: 128.5 kg (283 lb 4.7 oz)  Body mass index is 44.37 kg/m².    No intake or output data in the 24 hours ending 07/26/24 1637         Physical Exam  Vitals and nursing note reviewed.   Constitutional:       General: She is not in acute distress.     Appearance: She is not ill-appearing, toxic-appearing or diaphoretic.   HENT:      Head: Normocephalic and atraumatic.      Right Ear: External ear normal.      Left Ear: External ear normal.      Nose: Nose normal.   Cardiovascular:      Rate and Rhythm: Tachycardia present. Rhythm irregular.      Pulses: Normal pulses.      Heart sounds: Normal heart sounds. No murmur heard.     No friction rub. No gallop.      Comments: No JVD but with 1+ pitting edema bilaterally  Pulmonary:      Effort: Pulmonary effort is normal. No respiratory distress.      Breath sounds: Normal breath sounds. No wheezing, rhonchi or rales.   Abdominal:      General: Abdomen is flat. Bowel sounds are normal. There is no distension.      Palpations: Abdomen is soft.      Tenderness: There is no abdominal tenderness. There is no guarding or rebound.   Musculoskeletal:         General: No tenderness. Normal range of motion.   Skin:     General: Skin  is warm and dry.      Coloration: Skin is not jaundiced or pale.   Neurological:      General: No focal deficit present.      Mental Status: She is alert and oriented to person, place, and time. Mental status is at baseline.   Psychiatric:         Mood and Affect: Mood normal.         Behavior: Behavior normal.            Vents:  Vent Mode: Spont (07/16/24 1003)  Set Rate: 15 BPM (07/16/24 0903)  Vt Set: 420 mL (07/16/24 0903)  Pressure Support: 5 cmH20 (07/16/24 1003)  PEEP/CPAP: 5 cmH20 (07/16/24 1003)  Oxygen Concentration (%): 32 (07/25/24 1755)  Peak Airway Pressure: 10 cmH20 (07/16/24 1003)  Plateau Pressure: 0 cmH20 (07/16/24 1003)  Total Ve: 5.45 L/m (07/16/24 1003)  Negative Inspiratory Force (cm H2O): 0 (07/16/24 1003)  Lines/Drains/Airways       Central Venous Catheter Line  Duration                  Hemodialysis Catheter 07/11/24 1219 left subclavian 15 days    Trialysis (Dialysis) Catheter 07/20/24 1120 left femoral 6 days                  Significant Labs:    CBC/Anemia Profile:  Recent Labs   Lab 07/25/24  0427 07/25/24  1256 07/26/24  0612   WBC 14.15*  --  13.41*   HGB 8.0*  --  8.4*   HCT 26.0*  --  26.4*   PLT 97*  --  104*   MCV 85  --  87   RDW 22.9*  --  23.1*   IRON  --  17*  --    FERRITIN  --  156  --         Chemistries:  Recent Labs   Lab 07/24/24  2250 07/25/24  0427 07/26/24  0612    139 139   K 4.4 4.4 4.3    111* 110   CO2 20* 19* 18*   BUN 24* 26* 32*   CREATININE 3.1* 3.4* 4.5*   CALCIUM 8.5* 8.7 9.1   ALBUMIN 2.7* 2.6* 3.2*   MG 2.1 2.1 2.1   PHOS 3.3 3.6  3.6 3.8  3.8       All pertinent labs within the past 24 hours have been reviewed.    Significant Imaging:  I have reviewed all pertinent imaging results/findings within the past 24 hours.

## 2024-07-26 NOTE — PLAN OF CARE
Problem: Adult Inpatient Plan of Care  Goal: Plan of Care Review  Outcome: Progressing  Goal: Patient-Specific Goal (Individualized)  Outcome: Progressing  Goal: Absence of Hospital-Acquired Illness or Injury  Outcome: Progressing  Goal: Optimal Comfort and Wellbeing  Outcome: Progressing  Goal: Readiness for Transition of Care  Outcome: Progressing     Problem: Diabetes Comorbidity  Goal: Blood Glucose Level Within Targeted Range  Outcome: Progressing     Problem: Bariatric Environmental Safety  Goal: Safety Maintained with Care  Outcome: Progressing     Problem: Wound  Goal: Optimal Coping  Outcome: Progressing  Goal: Optimal Functional Ability  Outcome: Progressing  Goal: Absence of Infection Signs and Symptoms  Outcome: Progressing  Goal: Improved Oral Intake  Outcome: Progressing  Goal: Optimal Pain Control and Function  Outcome: Progressing  Goal: Skin Health and Integrity  Outcome: Progressing  Goal: Optimal Wound Healing  Outcome: Progressing     Problem: CRRT (Continuous Renal Replacement Therapy)  Goal: Safe, Effective Therapy Delivery  Outcome: Progressing  Goal: Hemodynamic Stability  Outcome: Progressing  Goal: Body Temperature Maintained in Desired Range  Outcome: Progressing  Goal: Absence of Infection Signs and Symptoms  Outcome: Progressing     Problem: Coping Ineffective  Goal: Effective Coping  Outcome: Progressing     Problem: Infection  Goal: Absence of Infection Signs and Symptoms  Outcome: Progressing     Problem: Mechanical Ventilation Invasive  Goal: Effective Communication  Outcome: Progressing  Goal: Optimal Device Function  Outcome: Progressing  Goal: Mechanical Ventilation Liberation  Outcome: Progressing  Goal: Optimal Nutrition Delivery  Outcome: Progressing  Goal: Absence of Device-Related Skin and Tissue Injury  Outcome: Progressing  Goal: Absence of Ventilator-Induced Lung Injury  Outcome: Progressing     Problem: Artificial Airway  Goal: Effective Communication  Outcome:  Progressing  Goal: Optimal Device Function  Outcome: Progressing  Goal: Absence of Device-Related Skin or Tissue Injury  Outcome: Progressing

## 2024-07-26 NOTE — PROGRESS NOTES
[facility] ([place] [id number])  Nephrology  Progress Note    Patient Name: Juhi Taylor  MRN: [id number]  Admission Date: 7/3/2024  Hospital Length of Stay: 23 days  Attending Provider: Bubba Ochoa MD   Primary Care Physician: Tony Sadler MD  Principal Problem:ESRD (end stage renal disease)    Subjective:     HPI: 74-year-old female with prior history of CKD stage 5 with recent PD catheter placement on 6/4, RCC s/p right nephrectomy, T2DM, obesity, CAD s/p PCI to Lcx and LAD in 11/2020 and HFmREF who is admitted as a transfer from [facility] for higher level of care and further cardiac evaluation. Nephrology consulted for hemodialysis.     She initially presented on 6/18/24 to [facility] for hypervolemia in the setting of CKD 4-5 for which she had undergone elective PD catheter placement complicated by catheter site leaking. She had a complex hospital course, was initially placed on furosemide gtt without significant improvement and later underwent placement of tunneled line for HD with volume removal. During her second dialysis session she went into PEA arrest for which she was resuscitated, intubated and transferred to ICU. She was noted to have elevated troponin and subsequent TTE found reduced EF from 40-45% to 20-25% for which coronary angiography was recommended. Her hospital course was also complicated by dialysis line infection, significant bleeding around the catheter site, bradycardia and subsequent atrial fibrillation with RVR. Given the catheter site bleeding, anticoagulation was temporarily held. She has reported allergy to amiodarone, was initially placed on diltiazem for atrial fibrillation despite reduced EF. She discussed GOC at outside hospital where it appears that she lacked capacity per note and decided to pursue higher level of care at [facility]. At that time, gen surgery was planning to place a left tunneled catheter due to concerns of the right side not being suitable    Upon  admit, patient appeared drowsy but conversant. Daughter reports that she typically gets lethargic 2/2 to the volume overload due to not receiving dialysis. Patient reported b/l left lower extremity pain. Daughter concerned about her not having dialysis in 4 days and wants a session today.     Interval History: Juhi is laying in bed this morning, still having diarrhea although it is improved. She would like to get up and move around to regain some strength.     Review of patient's allergies indicates:   Allergen Reactions    Percocet [oxycodone-acetaminophen] Itching    Januvia [sitagliptin]     Jardiance [empagliflozin]      Leg cramps    Lipitor [atorvastatin] Other (See Comments)     Severe leg pain    Linaclotide Other (See Comments) and Nausea And Vomiting     Does not remember    Lubiprostone Other (See Comments) and Palpitations     Does not remember     Current Facility-Administered Medications   Medication Frequency    0.9%  NaCl infusion (for blood administration) Q24H PRN    acetaminophen tablet 650 mg Q6H PRN    dextromethorphan-guaiFENesin  mg/5 ml liquid 5 mL Q4H PRN    dextrose 10% bolus 125 mL 125 mL PRN    dextrose 10% bolus 250 mL 250 mL PRN    diphenhydrAMINE capsule 25 mg Q6H PRN    glucagon (human recombinant) injection 1 mg PRN    glucose chewable tablet 16 g PRN    glucose chewable tablet 24 g PRN    heparin 25,000 units in dextrose 5% (100 units/ml) IV bolus from bag LOW INTENSITY nomogram - [facility] PRN    heparin 25,000 units in dextrose 5% (100 units/ml) IV bolus from bag LOW INTENSITY nomogram - [facility] PRN    heparin 25,000 units in dextrose 5% 250 mL (100 units/mL) infusion LOW INTENSITY nomogram - [facility] Continuous    insulin aspart U-100 pen 0-10 Units QID (AC + HS) PRN    LIDOcaine 5 % patch 2 patch Q24H    metoprolol injection 5 mg Q6H PRN    metoprolol succinate (TOPROL-XL) 24 hr tablet 50 mg Daily    miconazole NITRATE 2 % top powder BID    midodrine tablet 10 mg Daily PRN     naloxone 0.4 mg/mL injection 0.02 mg PRN    ondansetron injection 4 mg Q6H PRN    simethicone chewable tablet 80 mg TID PRN    sodium chloride 0.9% flush 10 mL PRN    traMADoL tablet 50 mg Q8H PRN    vancomycin 125 mg/5 mL oral solution 125 mg Q6H       Objective:     Vital Signs (Most Recent):  Temp: 98.8 °F (37.1 °C) (07/26/24 1207)  Pulse: (!) 112 (07/26/24 1445)  Resp: (!) 24 (07/26/24 1207)  BP: (!) 160/78 (07/26/24 1445)  SpO2: 100 % (07/26/24 1445) Vital Signs (24h Range):  Temp:  [98.1 °F (36.7 °C)-98.8 °F (37.1 °C)] 98.8 °F (37.1 °C)  Pulse:  [] 112  Resp:  [20-24] 24  SpO2:  [98 %-100 %] 100 %  BP: (115-164)/() 160/78     Weight: 128.5 kg (283 lb 4.7 oz) (07/24/24 1247)  Body mass index is 44.37 kg/m².  Body surface area is 2.46 meters squared.    I/O last 3 completed shifts:  In: 216.7 [I.V.:216.7]  Out: -      Physical Exam  Constitutional:       General: She is not in acute distress.     Appearance: Normal appearance. She is obese.   HENT:      Head: Normocephalic and atraumatic.      Mouth/Throat:      Mouth: Mucous membranes are moist.      Pharynx: Oropharynx is clear.   Cardiovascular:      Rate and Rhythm: Normal rate and regular rhythm.      Heart sounds: Normal heart sounds.   Pulmonary:      Effort: Pulmonary effort is normal.      Breath sounds: Normal breath sounds.      Comments: Wearing nasal cannula  Abdominal:      Palpations: Abdomen is soft.   Musculoskeletal:      Right lower leg: Edema (2+) present.      Left lower leg: Edema (2+) present.      Comments: Small amount of weeping in lower extremities.    Skin:     General: Skin is warm and dry.   Neurological:      General: No focal deficit present.      Mental Status: She is alert. She is disoriented.      Motor: Weakness present.          Significant Labs:  All labs within the past 24 hours have been reviewed.     Significant Imaging:  All imaging with the past 24 hours have been reviewed.  Assessment/Plan:     Renal/  *  ESRD (end stage renal disease)  ESRD - was briefly on PD then HD, having issues w tunnel cath and now w trialysis in place in [facility] ER. S/p R nephrectomy RCC  C diff positive - on oral vanc  HTN  DM  Afib w rvr  CHF w ischemic cardiomyopathy  S/p cardiac arrest     Plan/Recommendations  -HD today. Will plan to remove 2 L fluid. Currently we are planning for MWF scheduling. On heparin drip therapeutic AC.   -Discontinue albumin.   -Planning for replacement of tunnel cath w interventional nephrology likely next week on Tuesday or Wednesday w Dr Landa.  -Tunnel cath placed 7/11 - arterial port suctioning on vessel wall, trialysis placed.   -Renally adjust medications  -Pre and Post-HD weights   -Continue to monitor intake and output           Thank you for your consult. I will follow-up with patient. Please contact us if you have any additional questions.    Paulo Alicea MD  Nephrology  [facility] ([place])

## 2024-07-26 NOTE — PLAN OF CARE
NAVEEN spoke with Maria Luisa López dtr.,regarding dc planning options and recommendations by PT/OT  (moderate intensity therapy) which are: home with h/h, outpatient rehab, or snf.  Patient is not a good candidate for ip rehab.  Patient lives with dtr (Nell) and she and her dtr are co dependent.  Dtr (Nell) is unable to provide the physical care that patient needs; so SNF placement is the overall best option for the patient.  NAVEEN emailed Maria Luisa López dtr., a list of snf options for review.  Per Maria Luisa, there is no MPOA @ this time and both dtrs along with patient and other family members are making decisions.  Patient has transferred to [facility] and this SW has handed off to receiving SW on [facility].    Deonte Kaur LMSW  [facility]  X [phone]

## 2024-07-26 NOTE — CONSULTS
NIAS consulted for PIV placement.     Pt with sided limb alert. X2 unsuccessful attempts at PIV insertion on right arm.     No other vein found appropriate for insertion.

## 2024-07-26 NOTE — ASSESSMENT & PLAN NOTE
ESRD - was briefly on PD then HD, having issues w tunnel cath and now w trialysis in place in [facility]. S/p R nephrectomy RCC  C diff positive - on oral vanc  HTN  DM  Afib w rvr  CHF w ischemic cardiomyopathy  S/p cardiac arrest     Plan/Recommendations  -HD today. Will plan to remove 2 L fluid. Currently we are planning for MWF scheduling. On heparin drip therapeutic AC.   -Discontinue albumin.   -Planning for replacement of tunnel cath w interventional nephrology likely next week on Tuesday or Wednesday w Dr Landa.  -Tunnel cath placed 7/11 - arterial port suctioning on vessel wall, trialysis placed.   -Renally adjust medications  -Pre and Post-HD weights   -Continue to monitor intake and output

## 2024-07-26 NOTE — PT/OT/SLP PROGRESS
Physical Therapy Treatment    Patient Name:  Juhi Taylor   MRN:  [id number]    Recommendations:     Discharge Recommendations: Moderate Intensity Therapy  Discharge Equipment Recommendations: bedside commode, wheelchair, walker, rolling, bath bench, grab bar, hospital bed  Barriers to discharge:  Increased level of assistance needed and decreased caregiver support    Assessment:     Juhi Taylor is a 74 y.o. female admitted with a medical diagnosis of ESRD (end stage renal disease).  She presents with the following impairments/functional limitations: weakness, impaired endurance, impaired self care skills, impaired functional mobility, gait instability, impaired balance, decreased upper extremity function, decreased lower extremity function, decreased safety awareness, pain, decreased ROM, impaired coordination, edema, impaired cardiopulmonary response to activity. Pt and family requested session today and were given increased education on patient safety, roles of PT and current needs for patient mobility. Pt continues to required total A x2 for most activities and is not appropriate at this time to be transferred to bedside chair to maintain safety. Pt may benefit from sitting up in medi chair via draw sheet transfer with 2-3 skilled staff. Pt demonstrated very limited mobility in BLE's secondary to weakness and edema. Pt was given exercises to perform every hour to help reduce LE swelling within available range and was advised to raise HOB to highest position to simulate up in chair positioning.     Rehab Prognosis: Good; patient would benefit from acute skilled PT services to address these deficits and reach maximum level of function.    Recent Surgery: Procedure(s) (LRB):  REMOVAL, CATHETER, DIALYSIS, PERITONEAL (N/A)  BRONCHOSCOPY, FLEXIBLE (N/A) 11 Days Post-Op    Plan:     During this hospitalization, patient to be seen 4 x/week to address the identified rehab impairments via gait training,  "neuromuscular re-education, therapeutic activities, therapeutic exercises and progress toward the following goals:    Plan of Care Expires:  08/12/24    Subjective     Chief Complaint: "My left leg is sensitive to touch."  Patient/Family Comments/goals: get pt out of bed per daughter  Pain/Comfort:  Pain Rating 1: 6/10  Location - Side 1: Left  Location - Orientation 1: distal  Location 1: leg  Pain Addressed 1: Reposition, Distraction      Objective:     Communicated with nursing prior to session.  Patient found supine with blood pressure cuff, pulse ox (continuous), telemetry, central line, oxygen, peripheral IV upon PT entry to room.     General Precautions: Standard, fall, special contact  Orthopedic Precautions: N/A  Braces: N/A  Respiratory Status: Nasal cannula, flow 3 L/min     Functional Mobility:  Bed Mobility:     Rolling Left:  total assistance and of 2 persons  Scooting: total assistance and of 2 persons  Therapeutic Exercise: Patient performed 2 set(s) of 10 repetitions of  the following bed level exercises: ankle pumps, quad sets, heel slides, and hip abduction for bilateral LE. Patient required skilled PT for instruction of exercises and appropriate cues to perform exercises safely and appropriately.     AM-PAC 6 CLICK MOBILITY  Turning over in bed (including adjusting bedclothes, sheets and blankets)?: 2  Sitting down on and standing up from a chair with arms (e.g., wheelchair, bedside commode, etc.): 1  Moving from lying on back to sitting on the side of the bed?: 2  Moving to and from a bed to a chair (including a wheelchair)?: 1  Need to walk in hospital room?: 1  Climbing 3-5 steps with a railing?: 1  Basic Mobility Total Score: 8       Treatment & Education:  Patient provided with daily orientation and goals of this PT session.  Encouraged patient to perform daily exercises & mobility to increase endurance and decrease effects of bedrest. Time provided for therapeutic counseling and discussion " of health disposition. All questions answered to patient's satisfaction, within scope of PT practice; voiced no other concerns. White board updated in patient's room, RN notified of session.    Patient left HOB elevated with all lines intact, call button in reach, RN notified, and daughter present..    GOALS:   Multidisciplinary Problems       Physical Therapy Goals          Problem: Physical Therapy    Goal Priority Disciplines Outcome Goal Variances Interventions   Physical Therapy Goal     PT, PT/OT Progressing     Description: PT goals to be met by: 8/15/24    Patient will perform rolling each way with minimum assistance  Patient will perform supine <> sitting with minimum assistance  Patient will perform sit <> stand transitions with maximal assistance  Patient will perform transfers from bed <> chair or BSC with maximal assistance using LRAD  Patient will perform standing for 60 seconds with maximal assistance using RW or LRAD    DME Justifications (see above for complete DME recommendations)    Bedside Commode- Patient has a mobility limitation that significantly impairs their ability to participate in one or more mobility related activities of daily living, including toileting. This deficit can be resolved by using a bedside commode. Patient demonstrates mobility limitations that will cause them to be confined to one room at home without bathroom access for up to 30 days. Using a bedside commode will greatly improve the patient's ability to participate in MRADLs.     Rolling Walker- Patient demonstrates a mobility limitation that significantly impairs their ability to participate in one or more mobility related activities of daily living. Patient's mobility limitation cannot be sufficiently resolved with the use of a cane, but can be sufficiently resolved with the use of a rolling walker.The use of a rolling walker will considerably improve their ability to participate in MRADLs. Patient will use the walker  on a regular basis at home.      Wheelchair-  Patient has a mobility limitation that significantly impairs their ability to participate in one or more mobility related activities of daily living in customary locations in the home. The mobility limitation cannot be sufficiently resolved by the use of a cane or walker. The use of a manual wheelchair will greatly improve the patient's ability to participate in MRADLs. The patient will use the wheelchair on a regular basis at home. They have expressed their willingness to use a manual wheelchair in the home, and have a caregiver who is available and willing to assist with the wheelchair if needed.     Hospital Bed ·       Patient requires a hospital bed for positioning of the body in ways that are not feasible with an ordinary bed. The patient requires special positioning for pain relief, limited mobility, and/or being unable to independently make changes in body position without the use of a hospital bed. Pillows and wedges will not be adequate for resolving these positional issues.                         Time Tracking:     PT Received On: 07/26/24  PT Start Time: 1159     PT Stop Time: 1240  PT Total Time (min): 41 min     Billable Minutes: Therapeutic Activity 28 and Therapeutic Exercise 13    Treatment Type: Treatment  PT/PTA: PTA     Number of PTA visits since last PT visit: 1 07/26/2024

## 2024-07-26 NOTE — SUBJECTIVE & OBJECTIVE
Interval History: Juhi is laying in bed this morning, still having diarrhea although it is improved. She would like to get up and move around to regain some strength.     Review of patient's allergies indicates:   Allergen Reactions    Percocet [oxycodone-acetaminophen] Itching    Januvia [sitagliptin]     Jardiance [empagliflozin]      Leg cramps    Lipitor [atorvastatin] Other (See Comments)     Severe leg pain    Linaclotide Other (See Comments) and Nausea And Vomiting     Does not remember    Lubiprostone Other (See Comments) and Palpitations     Does not remember     Current Facility-Administered Medications   Medication Frequency    0.9%  NaCl infusion (for blood administration) Q24H PRN    acetaminophen tablet 650 mg Q6H PRN    dextromethorphan-guaiFENesin  mg/5 ml liquid 5 mL Q4H PRN    dextrose 10% bolus 125 mL 125 mL PRN    dextrose 10% bolus 250 mL 250 mL PRN    diphenhydrAMINE capsule 25 mg Q6H PRN    glucagon (human recombinant) injection 1 mg PRN    glucose chewable tablet 16 g PRN    glucose chewable tablet 24 g PRN    heparin 25,000 units in dextrose 5% (100 units/ml) IV bolus from bag LOW INTENSITY nomogram - [facility] PRN    heparin 25,000 units in dextrose 5% (100 units/ml) IV bolus from bag LOW INTENSITY nomogram - [facility] PRN    heparin 25,000 units in dextrose 5% 250 mL (100 units/mL) infusion LOW INTENSITY nomogram - [facility] Continuous    insulin aspart U-100 pen 0-10 Units QID (AC + HS) PRN    LIDOcaine 5 % patch 2 patch Q24H    metoprolol injection 5 mg Q6H PRN    metoprolol succinate (TOPROL-XL) 24 hr tablet 50 mg Daily    miconazole NITRATE 2 % top powder BID    midodrine tablet 10 mg Daily PRN    naloxone 0.4 mg/mL injection 0.02 mg PRN    ondansetron injection 4 mg Q6H PRN    simethicone chewable tablet 80 mg TID PRN    sodium chloride 0.9% flush 10 mL PRN    traMADoL tablet 50 mg Q8H PRN    vancomycin 125 mg/5 mL oral solution 125 mg Q6H       Objective:     Vital Signs (Most  Recent):  Temp: 98.8 °F (37.1 °C) (07/26/24 1207)  Pulse: (!) 112 (07/26/24 1445)  Resp: (!) 24 (07/26/24 1207)  BP: (!) 160/78 (07/26/24 1445)  SpO2: 100 % (07/26/24 1445) Vital Signs (24h Range):  Temp:  [98.1 °F (36.7 °C)-98.8 °F (37.1 °C)] 98.8 °F (37.1 °C)  Pulse:  [] 112  Resp:  [20-24] 24  SpO2:  [98 %-100 %] 100 %  BP: (115-164)/() 160/78     Weight: 128.5 kg (283 lb 4.7 oz) (07/24/24 1247)  Body mass index is 44.37 kg/m².  Body surface area is 2.46 meters squared.    I/O last 3 completed shifts:  In: 216.7 [I.V.:216.7]  Out: -      Physical Exam  Constitutional:       General: She is not in acute distress.     Appearance: Normal appearance. She is obese.   HENT:      Head: Normocephalic and atraumatic.      Mouth/Throat:      Mouth: Mucous membranes are moist.      Pharynx: Oropharynx is clear.   Cardiovascular:      Rate and Rhythm: Normal rate and regular rhythm.      Heart sounds: Normal heart sounds.   Pulmonary:      Effort: Pulmonary effort is normal.      Breath sounds: Normal breath sounds.      Comments: Wearing nasal cannula  Abdominal:      Palpations: Abdomen is soft.   Musculoskeletal:      Right lower leg: Edema (2+) present.      Left lower leg: Edema (2+) present.      Comments: Small amount of weeping in lower extremities.    Skin:     General: Skin is warm and dry.   Neurological:      General: No focal deficit present.      Mental Status: She is alert. She is disoriented.      Motor: Weakness present.          Significant Labs:  All labs within the past 24 hours have been reviewed.     Significant Imaging:  All imaging with the past 24 hours have been reviewed.

## 2024-07-26 NOTE — ASSESSMENT & PLAN NOTE
Pt previously on PD. Unable to remove enough volume with PD catheter. Initially upgraded to MICU for emergent trialysis placement and CRRT. Now with tunneled dialysis catheter. Tolerated session of HD on 7/12 without complications.      - Nephrology following, appreciate recs  - Strict I/Os  - Renally dose meds  - Avoid nephrotoxic agents  - PD catheter removed 07/15  - Transferred to MICU for SLED  --Tunneled dialysis catheter is not functioning.   --Trialysis catheter placed in left femoral on 7/20.  --Tolerating iHD  --Received albumin 25 g q8h x 3 days per Nephrology, ending 7/25

## 2024-07-27 PROBLEM — I82.412 ACUTE DEEP VEIN THROMBOSIS (DVT) OF FEMORAL VEIN OF LEFT LOWER EXTREMITY: Status: ACTIVE | Noted: 2024-01-01

## 2024-07-27 NOTE — CLINICAL REVIEW
IP Sepsis Screen (most recent)       Sepsis Screen (IP) - 07/27/24 1521       Is the patient's history or complaint suggestive of a possible infection? Yes  -LW    Are there at least two of the following signs and symptoms present? Yes  -LW    Sepsis signs/symptoms - Tachycardia Tachycardia     >90  -LW    Sepsis signs/symptoms - WBC WBC < 4,000 or WBC > 12,000  -LW    Are any of the following organ dysfunction criteria present and not considered to be due to a chronic condition? Yes   esrd -LW    Organ Dysfunction Criteria Total Bilirubin > 2.0 Platelet count < 100,000  -LW    Initiate Sepsis Protocol No  -LW    Reason sepsis not considered Pt. receiving appropriate management  -LW              User Key  (r) = Recorded By, (t) = Taken By, (c) = Cosigned By      Initials Name    Rhina Carver RN

## 2024-07-27 NOTE — PROGRESS NOTES
[facility] - Stepdown [facility] ([place] [address])  [facility]  Progress Note    Patient Name: Juhi Taylor  MRN: [id number]  Patient Class: IP- Inpatient   Admission Date: 7/3/2024  Length of Stay: 24 days  Attending Physician: Bubba Ochoa MD  Primary Care Provider: Tony Sadler MD        Subjective:     Principal Problem:ESRD (end stage renal disease)        HPI:  Juhi Taylor is a 74-year-old female with a past medical history of CKD stage 5 with recent PD catheter placement on 6/4, RCC s/p right nephrectomy, T2DM, obesity, CAD s/p PCI to Lcx and LAD in 11/2020 and HFmREF who is admitted as a transfer from [facility] for higher level of care and further cardiac evaluation. Nephrology consulted for hemodialysis. She initially presented on 6/18/24 to [facility] for hypervolemia in the setting of CKD 4-5 for which she had undergone elective PD catheter placement complicated by catheter site leaking. She had a complex hospital course, was initially placed on furosemide gtt without significant improvement and later underwent placement of tunneled line for HD with volume removal. During her second dialysis session she went into PEA arrest for which she was resuscitated, intubated and transferred to ICU. She was noted to have elevated troponin and subsequent TTE found reduced EF from 40-45% to 20-25% for which coronary angiography was recommended. Her hospital course was also complicated by dialysis line infection, significant bleeding around the catheter site, bradycardia and subsequent atrial fibrillation with RVR. Given the catheter site bleeding, anticoagulation was temporarily held. She has reported allergy to amiodarone, was initially placed on diltiazem for atrial fibrillation despite reduced EF. She discussed GOC at outside hospital where it appears that she lacked capacity per note and decided to pursue higher level of care at [facility]. At that time, gen surgery was planning to place a left tunneled  catheter due to concerns of the right side not being suitable     Upon admit, patient appeared drowsy but conversant. Daughter reports that she typically gets lethargic 2/2 to the volume overload due to not receiving dialysis. Patient reported b/l left lower extremity pain. Daughter concerned about her not having dialysis in 4 days and wants a session today.      Pt initially refusing dialysis. Seen by nephrology. VBG 7.220/33.4/34/13.7/-14. Critical care medicine consulted for emergent trialysis line placement and dialysis.     Overview/Hospital Course:  Ms. Taylor was admitted to MICU on 7/8 for urgent dialysis line placement and initiated of HD. Patient received short run of CRRT overnight without issue. Encephalopathy improving with continued CRRT treatments. Persistent AFib with RVR, received Digoxin loading dose (renally dosed), restarted PO Carvedilol. Patient remains in persistent AFib RVR despite therapeutic digoxin level and uptitration of Carvedilol levels, started on Amiodarone infusion overnight. Transitioned to amio PO, started on heparin gtt for afib AC. Tolerated HD well overnight without complications and less encephalopathic upon exam. Planning for gen surg to remove PD catheter. Upon induction pt vomited copious amounts of bile, was intubated. PD cathter removed successfully. Extubated to NC 7/16.      7/18:  Patient made DNR after Palliative Care discussions yesterday.  Tolerated SLED 12 hours overnight.  Discussed difficulties regarding HD line with Interventional Nephrology and plans to replace it next week if patient it amenable.  Patient was stepped down from ICU.  7/19:  Stepped back up to medical ICU with plans for overnight SLED x 3 days.     On 7/19 Nephrology requested that the patient be stepped back up to the medical ICU for SLED. A temporary trialysis HD catheter was placed on 7/20/24 in the groin. She also began having diarrhea, which tested positive for Clostridium difficile so  she was started on PO vancomycin 7/22/24. HD trial on 7/24; remained HDS. Ready for stepdown.    Interval History/Significant Events: NAEON. S/p HD yesterday with removal of 2.6L. O2 requirements improving, currently on 2L NC. Persistent diarrhea, not agreeable for rectal tube at this time. LLE pain/swelling/erythema noted, LE venous doppler identified nonocclusive thrombus within the mid and distal femoral vein. Currently on heparin gtt.     Review of Systems   Respiratory:  Negative for shortness of breath.    Cardiovascular:  Positive for leg swelling. Negative for chest pain.   Gastrointestinal:  Positive for diarrhea.   Musculoskeletal:  Negative for myalgias.   All other systems reviewed and are negative.    Objective:     Vital Signs (Most Recent):  Temp: 98.1 °F (36.7 °C) (07/27/24 1303)  Pulse: 109 (07/27/24 1309)  Resp: 18 (07/27/24 0520)  BP: 123/70 (07/27/24 1303)  SpO2: 100 % (07/27/24 1303) Vital Signs (24h Range):  Temp:  [97.6 °F (36.4 °C)-99 °F (37.2 °C)] 98.1 °F (36.7 °C)  Pulse:  [] 109  Resp:  [18-20] 18  SpO2:  [94 %-100 %] 100 %  BP: (115-152)/(58-78) 123/70   Weight: 128.5 kg (283 lb 4.7 oz)  Body mass index is 44.37 kg/m².      Intake/Output Summary (Last 24 hours) at 7/27/2024 1506  Last data filed at 7/26/2024 1659  Gross per 24 hour   Intake 650 ml   Output 2650 ml   Net -2000 ml            Physical Exam  Vitals and nursing note reviewed.   Constitutional:       General: She is not in acute distress.     Appearance: She is not ill-appearing, toxic-appearing or diaphoretic.   HENT:      Head: Normocephalic and atraumatic.      Right Ear: External ear normal.      Left Ear: External ear normal.      Nose: Nose normal.   Cardiovascular:      Rate and Rhythm: Tachycardia present. Rhythm irregular.      Pulses: Normal pulses.      Heart sounds: Normal heart sounds. No murmur heard.     No friction rub. No gallop.      Comments: No JVD but with 1+ pitting edema bilaterally  Pulmonary:       Effort: Pulmonary effort is normal. No respiratory distress.      Breath sounds: Normal breath sounds. No wheezing, rhonchi or rales.   Abdominal:      General: Abdomen is flat. Bowel sounds are normal. There is no distension.      Palpations: Abdomen is soft.      Tenderness: There is no abdominal tenderness. There is no guarding or rebound.   Musculoskeletal:         General: Swelling present. No tenderness. Normal range of motion.      Right lower leg: Edema present.      Left lower leg: Edema present.   Skin:     General: Skin is warm and dry.      Coloration: Skin is not jaundiced or pale.      Findings: Erythema (LLE) present.   Neurological:      General: No focal deficit present.      Mental Status: She is alert and oriented to person, place, and time. Mental status is at baseline.   Psychiatric:         Mood and Affect: Mood normal.         Behavior: Behavior normal.            Vents:  Vent Mode: Spont (07/16/24 1003)  Set Rate: 15 BPM (07/16/24 0903)  Vt Set: 420 mL (07/16/24 0903)  Pressure Support: 5 cmH20 (07/16/24 1003)  PEEP/CPAP: 5 cmH20 (07/16/24 1003)  Oxygen Concentration (%): 32 (07/25/24 1755)  Peak Airway Pressure: 10 cmH20 (07/16/24 1003)  Plateau Pressure: 0 cmH20 (07/16/24 1003)  Total Ve: 5.45 L/m (07/16/24 1003)  Negative Inspiratory Force (cm H2O): 0 (07/16/24 1003)  Lines/Drains/Airways       Central Venous Catheter Line  Duration                  Hemodialysis Catheter 07/11/24 1219 left subclavian 16 days    Trialysis (Dialysis) Catheter 07/20/24 1120 left femoral 7 days                  Significant Labs:    CBC/Anemia Profile:  Recent Labs   Lab 07/26/24  0612 07/27/24  0616   WBC 13.41* 14.69*   HGB 8.4* 7.4*   HCT 26.4* 23.9*   * 96*   MCV 87 87   RDW 23.1* 22.6*        Chemistries:  Recent Labs   Lab 07/26/24  0612 07/27/24  0616    139   K 4.3 3.8    110   CO2 18* 18*   BUN 32* 36*   CREATININE 4.5* 5.1*   CALCIUM 9.1 8.8   ALBUMIN 3.2* 2.4*   MG 2.1 2.0   PHOS  3.8  3.8 4.0  4.0       All pertinent labs within the past 24 hours have been reviewed.    Significant Imaging:  I have reviewed all pertinent imaging results/findings within the past 24 hours.    Assessment/Plan:      * ESRD (end stage renal disease)  Pt previously on PD. Unable to remove enough volume with PD catheter. Initially upgraded to MICU for emergent trialysis placement and CRRT. Now with tunneled dialysis catheter. Tolerated session of HD on 7/12 without complications.      - Nephrology following, appreciate recs  - Strict I/Os  - Renally dose meds  - Avoid nephrotoxic agents  - PD catheter removed 07/15  - Transferred to MICU for SLED  - Tunneled dialysis catheter is not functioning.   - Trialysis catheter placed in left femoral on 7/20.  - Tolerating iHD  - Received albumin 25 g q8h x 3 days per Nephrology, ending 7/25   - Planning for placement of new tunneled HD catheter next week    Acute deep vein thrombosis (DVT) of femoral vein of left lower extremity  LE venous doppler identified nonocclusive thrombus within the mid and distal femoral vein.   Cont heparin gtt.       Clostridium difficile infection  --Diagnosed 7/22/24.  --PO vancomycin 125 mg q6h x 10 days. Stop date: 8/1/24.      Diarrhea  +C.diff, continue PO vancomycin  Pt refusing rectal tube at this time      Debility  PT/OT    Palliative care encounter  - Palliative care following, continued GOC with family.   - Patient has requested code status changed to DNR      ACP (advance care planning)  --Palliative care consulted, appreciate their assistance in clarification of GOC/POC with patient and family.      Atrial fibrillation with RVR  --AFib with RVR appears new following cardiac arrest at OSH.   --Patient endorses amiodarone reaction with itching, rash, and oral burning. Refused Sotalol, DCCV, and AICD placement at OSH.   --Was placed on Diltizem infusion and transitioned to PO metoprolol. Amio gtt restarted overnight 7/11 s/t  uncontrolled afib with RVR. Transitioned to PO amio.  --Patient's rate remained elevated despite amiodarone. Appeared to respond better to metoprolol.   --Continue Toprol 50 mg daily.   --Apixaban on hold. Anticoagulation with a heparin infusion.      Acute on chronic combined systolic and diastolic heart failure  Echo    Left Ventricle: The left ventricle is moderately dilated. Normal wall thickness. Global hypokinesis present. There is severely reduced systolic function with a visually estimated ejection fraction of 20 - 25%.    Right Ventricle: Mild right ventricular enlargement. Wall thickness is normal. Right ventricle wall motion has global hypokinesis. Systolic function is moderately reduced.    Left Atrium: Left atrium is severely dilated.    Right Atrium: Right atrium is severely dilated.    Aortic Valve: The aortic valve is a trileaflet valve. There is mild aortic valve sclerosis.    Mitral Valve: There is mild regurgitation.    Tricuspid Valve: There is severe regurgitation.    Pulmonary Artery: There is moderate pulmonary hypertension. The estimated pulmonary artery systolic pressure is 61 mmHg.    IVC/SVC: Elevated venous pressure at 15 mmHg.    Pericardium: There is a trivial circumferential effusion. No indication of cardiac tamponade.     - Will require interventional cardiology for LHC vs PET for reduced EF once euvolemic  - LifeVest prior to discharge  - Continue Entresto 24-26mg BID per cardiology recs, held for hypotension            Severe obesity (BMI >= 40)  Body mass index is 44.37 kg/m². Morbid obesity complicates all aspects of disease management from diagnostic modalities to treatment. Weight loss encouraged and health benefits explained to patient.         Type 2 diabetes mellitus with microalbuminuria, without long-term current use of insulin  --Latest A1C 6.8; takes metformin at home.   --Target -180. Within goal.   --Continue to monitor.       Essential hypertension  --Takes  carvedilol + clonidine at home.   --Hold antihypertensives due to hypotension.   --Continue metoprolol for rate control.       VTE Risk Mitigation (From admission, onward)           Ordered     heparin 25,000 units in dextrose 5% (100 units/ml) IV bolus from bag LOW INTENSITY nomogram - [facility]  As needed (PRN)        Question:  Heparin Infusion Adjustment (DO NOT MODIFY ANSWER)  Answer:  [email or web address]\Images\Pharmacy\HeparinInfusions\heparin LOW INTENSITY nomogram for [facility] [id number]    07/21/24 1236     heparin 25,000 units in dextrose 5% (100 units/ml) IV bolus from bag LOW INTENSITY nomogram - [facility]  As needed (PRN)        Question:  Heparin Infusion Adjustment (DO NOT MODIFY ANSWER)  Answer:  [email or web address]\Images\Pharmacy\HeparinInfusions\heparin LOW INTENSITY nomogram for [facility] [id number]    07/21/24 1236     heparin 25,000 units in dextrose 5% 250 mL (100 units/mL) infusion LOW INTENSITY nomogram - [facility]  Continuous        Question:  Begin at (units/kg/hr)  Answer:  12    07/21/24 1236     heparin 25,000 units in dextrose 5% 250 mL (100 units/mL) infusion (heparin infusion - NO NOMOGRAM)  Continuous         07/20/24 1217     Place sequential compression device  Until discontinued         07/15/24 1330     IP VTE HIGH RISK PATIENT  Once         07/15/24 1330                    Discharge Planning   HARINI: 7/29/2024     Code Status: DNR   Is the patient medically ready for discharge?:     Reason for patient still in hospital (select all that apply): Treatment  Discharge Plan A: Skilled Nursing Facility   Discharge Delays: (!) Procedure Scheduling (IR, OR, Labs, Echo, Cath, Echo, EEG)              Bubba Ochoa MD  Department of Hospital Medicine   [facility] - Stepdown Flex ([place])

## 2024-07-27 NOTE — CLINICAL REVIEW
Nephrology Chart Review      Intake/Output Summary (Last 24 hours) at 7/27/2024 1007  Last data filed at 7/26/2024 1659  Gross per 24 hour   Intake 650 ml   Output 2650 ml   Net -2000 ml       Vitals:    07/27/24 0313 07/27/24 0430 07/27/24 0520 07/27/24 0909   BP:  115/73  135/60   BP Location:  Right arm  Right arm   Patient Position:  Lying  Lying   Pulse: 107 107  (!) 132   Resp:   18    Temp:  98.7 °F (37.1 °C)  98.2 °F (36.8 °C)   TempSrc:  Oral  Axillary   SpO2: 99% 99%  (!) 94%   Weight:       Height:           Recent Labs   Lab 07/25/24  0427 07/26/24  0612 07/27/24  0616    139 139   K 4.4 4.3 3.8   * 110 110   CO2 19* 18* 18*   BUN 26* 32* 36*   CREATININE 3.4* 4.5* 5.1*   CALCIUM 8.7 9.1 8.8   PHOS 3.6  3.6 3.8  3.8 4.0  4.0         Still having diarrhea. Pain in legs, describes as burning. Tentatively plan for dialysis on Monday.   No other related issues identified. Please call Nephrology as needed; We will continue to follow.

## 2024-07-27 NOTE — PLAN OF CARE
Problem: Adult Inpatient Plan of Care  Goal: Plan of Care Review  Outcome: Progressing  Goal: Patient-Specific Goal (Individualized)  Outcome: Progressing  Goal: Absence of Hospital-Acquired Illness or Injury  Outcome: Progressing  Goal: Optimal Comfort and Wellbeing  Outcome: Progressing  Goal: Readiness for Transition of Care  Outcome: Progressing     Problem: Diabetes Comorbidity  Goal: Blood Glucose Level Within Targeted Range  Outcome: Progressing     Problem: Sepsis/Septic Shock  Goal: Optimal Coping  Outcome: Progressing  Goal: Absence of Bleeding  Outcome: Progressing  Goal: Blood Glucose Level Within Targeted Range  Outcome: Progressing  Goal: Absence of Infection Signs and Symptoms  Outcome: Progressing  Goal: Optimal Nutrition Intake  Outcome: Progressing     Problem: Wound  Goal: Optimal Coping  Outcome: Progressing  Goal: Optimal Functional Ability  Outcome: Progressing  Goal: Absence of Infection Signs and Symptoms  Outcome: Progressing  Goal: Improved Oral Intake  Outcome: Progressing  Goal: Optimal Pain Control and Function  Outcome: Progressing  Goal: Skin Health and Integrity  Outcome: Progressing  Goal: Optimal Wound Healing  Outcome: Progressing     Problem: Bariatric Environmental Safety  Goal: Safety Maintained with Care  Outcome: Progressing     Problem: Fall Injury Risk  Goal: Absence of Fall and Fall-Related Injury  Outcome: Progressing     Problem: Coping Ineffective  Goal: Effective Coping  Outcome: Progressing     Problem: CRRT (Continuous Renal Replacement Therapy)  Goal: Safe, Effective Therapy Delivery  Outcome: Progressing  Goal: Hemodynamic Stability  Outcome: Progressing  Goal: Body Temperature Maintained in Desired Range  Outcome: Progressing  Goal: Absence of Infection Signs and Symptoms  Outcome: Progressing     Problem: Infection  Goal: Absence of Infection Signs and Symptoms  Outcome: Progressing     Problem: Hemodialysis  Goal: Safe, Effective Therapy Delivery  Outcome:  Progressing  Goal: Effective Tissue Perfusion  Outcome: Progressing  Goal: Absence of Infection Signs and Symptoms  Outcome: Progressing     Problem: Mechanical Ventilation Invasive  Goal: Effective Communication  Outcome: Progressing  Goal: Optimal Device Function  Outcome: Progressing  Goal: Mechanical Ventilation Liberation  Outcome: Progressing  Goal: Optimal Nutrition Delivery  Outcome: Progressing  Goal: Absence of Device-Related Skin and Tissue Injury  Outcome: Progressing  Goal: Absence of Ventilator-Induced Lung Injury  Outcome: Progressing     Problem: Artificial Airway  Goal: Effective Communication  Outcome: Progressing  Goal: Optimal Device Function  Outcome: Progressing  Goal: Absence of Device-Related Skin or Tissue Injury  Outcome: Progressing

## 2024-07-27 NOTE — ASSESSMENT & PLAN NOTE
LE venous doppler identified nonocclusive thrombus within the mid and distal femoral vein.   Cont heparin gtt.

## 2024-07-27 NOTE — ASSESSMENT & PLAN NOTE
Pt previously on PD. Unable to remove enough volume with PD catheter. Initially upgraded to MICU for emergent trialysis placement and CRRT. Now with tunneled dialysis catheter. Tolerated session of HD on 7/12 without complications.      - Nephrology following, appreciate recs  - Strict I/Os  - Renally dose meds  - Avoid nephrotoxic agents  - PD catheter removed 07/15  - Transferred to MICU for SLED  - Tunneled dialysis catheter is not functioning.   - Trialysis catheter placed in left femoral on 7/20.  - Tolerating iHD  - Received albumin 25 g q8h x 3 days per Nephrology, ending 7/25   - Planning for placement of new tunneled HD catheter next week

## 2024-07-27 NOTE — PLAN OF CARE
Problem: Adult Inpatient Plan of Care  Goal: Plan of Care Review  Outcome: Progressing  Goal: Patient-Specific Goal (Individualized)  Outcome: Progressing  Goal: Absence of Hospital-Acquired Illness or Injury  Outcome: Progressing  Goal: Optimal Comfort and Wellbeing  Outcome: Progressing  Goal: Readiness for Transition of Care  Outcome: Progressing     Problem: Diabetes Comorbidity  Goal: Blood Glucose Level Within Targeted Range  Outcome: Progressing     Problem: Sepsis/Septic Shock  Goal: Optimal Coping  Outcome: Progressing  Goal: Absence of Bleeding  Outcome: Progressing  Goal: Blood Glucose Level Within Targeted Range  Outcome: Progressing  Goal: Absence of Infection Signs and Symptoms  Outcome: Progressing  Goal: Optimal Nutrition Intake  Outcome: Progressing     Problem: Bariatric Environmental Safety  Goal: Safety Maintained with Care  Outcome: Progressing     Problem: Wound  Goal: Optimal Coping  Outcome: Progressing  Goal: Optimal Functional Ability  Outcome: Progressing  Goal: Absence of Infection Signs and Symptoms  Outcome: Progressing  Goal: Improved Oral Intake  Outcome: Progressing  Goal: Optimal Pain Control and Function  Outcome: Progressing  Goal: Skin Health and Integrity  Outcome: Progressing  Goal: Optimal Wound Healing  Outcome: Progressing     Problem: Skin Injury Risk Increased  Goal: Skin Health and Integrity  Outcome: Progressing     Problem: Fall Injury Risk  Goal: Absence of Fall and Fall-Related Injury  Outcome: Progressing     Problem: Coping Ineffective  Goal: Effective Coping  Outcome: Progressing     Problem: CRRT (Continuous Renal Replacement Therapy)  Goal: Safe, Effective Therapy Delivery  Outcome: Progressing  Goal: Hemodynamic Stability  Outcome: Progressing  Goal: Body Temperature Maintained in Desired Range  Outcome: Progressing  Goal: Absence of Infection Signs and Symptoms  Outcome: Progressing     Problem: Infection  Goal: Absence of Infection Signs and  Symptoms  Outcome: Progressing     Problem: Hemodialysis  Goal: Safe, Effective Therapy Delivery  Outcome: Progressing  Goal: Effective Tissue Perfusion  Outcome: Progressing  Goal: Absence of Infection Signs and Symptoms  Outcome: Progressing     Problem: Mechanical Ventilation Invasive  Goal: Effective Communication  Outcome: Progressing  Goal: Optimal Device Function  Outcome: Progressing  Goal: Mechanical Ventilation Liberation  Outcome: Progressing  Goal: Optimal Nutrition Delivery  Outcome: Progressing  Goal: Absence of Device-Related Skin and Tissue Injury  Outcome: Progressing  Goal: Absence of Ventilator-Induced Lung Injury  Outcome: Progressing     Problem: Artificial Airway  Goal: Effective Communication  Outcome: Progressing  Goal: Optimal Device Function  Outcome: Progressing  Goal: Absence of Device-Related Skin or Tissue Injury  Outcome: Progressing      Last aPTT (45) maintained rate. aPTT this morning pending. Hep at 17U. Bms x3. Pain med at 0520. Last BS was 100. No insulin coverage.

## 2024-07-27 NOTE — PROGRESS NOTES
VSS. Increased diarrhea-rectal tube placed. Dressing changes done. Patients with complaints of worsening LLE pain. LLE red, warm, and painful to touch upon assessment. Dr. Ochoa notified, US ordered. Per MD, extremity positive for DVT, no changes as patient is already on hep gtt.\  Hep gtt 17u/k-therapeutic. IV iron initiated. Patient resting at this time with needs met and safety measures in place.   07/27/24 0941   Pain/Comfort/Sleep   Pain Rating (0-10): Rest 0   Pain Rating: Rest 0 - no pain   FACES Pain Rating: Rest 0-->no hurt   FACES Pain Rating: Activity 0-->no hurt   rFLACC Pain Rating: - Face 0-->no particular expression or smile   rFLACC Pain Rating: - Legs 0-->normal position or relaxed   rFLACC Pain Rating: - Activity 0-->lying quietly, normal position, moves easily   rFLACC Pain Rating: - Cry 0-->no cry (awake or asleep)   rFLACC Pain Rating: - Consolability 0-->content, relaxed   rFLACC Score: 0   PAINAD Breathing 0-->normal   PAINAD Negative Vocalization 0-->none   PAINAD Facial Expression 0-->smiling or inexpressive   PAINAD Body Language 0-->relaxed   PAINAD Consolability 0-->no need to console   PAINAD Score 0   Cognitive   Cognitive/Neuro/Behavioral WDL ex;orientation  (int. confusion)   Level of Consciousness (AVPU) alert   Arousal Level opens eyes spontaneously   Orientation oriented x 4   Speech clear/fluent;follows commands   Mood/Behavior calm;cooperative   Pupils   Pupil PERRLA yes   Pupil Size Left 3 mm   Pupil Shape Left round   Pupil Reaction Left brisk;equal   Pupil Accommodation Left normal response   Pupil Size Right 3 mm   Pupil Shape Right round   Pupil Reaction Right brisk;equal   Pupil Accommodation Right normal response   [place] Coma Scale   Best Eye Response 4-->(E4) spontaneous   Best Motor Response 6-->(M6) obeys commands   Best Verbal Response 4-->(V4) confused   [place] Coma Scale Score 14   Motor Response   Motor Response general motor response   General Motor Response  purposeful motor response   HEENT   HEENT WDL ex;ear;eye   Head Symptoms no tenderness;no swelling   Face Symptoms symmetrical at rest, with movement and expression;no swelling or tenderness   Vision Aid glasses on   Respiratory   Respiratory WDL ex;cough;breath sounds   Breath Sounds   All Lung Fields Breath Sounds Anterior:;diminished   Cardiac   Cardiac WDL ex   Cardiac Rhythm apical pulse irregular;radial pulse irregular   Heart Sounds S1, S2   Peripheral Neurovascular   Peripheral Neurovascular WDL ex;edema   Pulse Assessment dorsalis pedis;radial   VTE Required Core Measure Pharmacological prophylaxis initiated/maintained   VTE Prevention/Management bleeding risk assessed   All Extremities Neurovascular Assessment   General All Extremity Temperature warm   General All Extremity Color no discoloration   General All Extremity Sensation no tingling;no numbness   Pulse Radial   Left Radial Pulse 2+ (normal)   Right Radial Pulse 2+ (normal)   Pulse Dorsalis Pedis   Left Dorsalis Pedis Pulse 1+ (weak)   Right Dorsalis Pedis Pulse 1+ (weak)   Pulse Posterior Tibial   Left Posterior Tibial Pulse 1+ (weak)   Right Posterior Tibial Pulse 1+ (weak)        Hemodialysis Catheter 07/11/24 1219 left subclavian   Placement Date/Time: 07/11/24 1219   Hand Hygiene: Performed  Barrier Precautions: Performed  Skin Antisepsis: ChloraPrep  Hemodialysis Catheter Type: Tunneled catheter  Location: left subclavian  Catheter Size (Fr): 14 Fr  Pressure Injectable Cathete...   Site Assessment No drainage;No redness;No swelling;No warmth   Line Securement Device Secured with sutureless device   Dressing Type CHG impregnated dressing/sponge   Dressing Status Clean;Dry;Intact   Dressing Intervention Integrity maintained   Trialysis (Dialysis) Catheter 07/20/24 1120 left femoral   Placement Date/Time: 07/20/24 1120   Present Prior to Hospital Arrival?: No  Hand Hygiene: Performed  Barrier Precautions: Performed  Skin Antisepsis: ChloraPrep   Location: left femoral  Insertion attempts (enter comment if more than 2 attempts): 2  G...   Site Assessment Not assessed;No warmth;No swelling;No drainage;No redness   Line Securement Device Secured with sutureless device   Dressing Type CHG impregnated dressing/sponge   Dressing Status Clean;Dry;Intact   Dressing Intervention Integrity maintained   Skin   Skin WDL ex;all   Skin Color/Characteristics pale   Skin Temperature warm   Skin Moisture dry   Skin Elasticity firm   Skin Integrity bruised (ecchymotic);other (see comments)  (blisters)   Jus Risk Assessment   Sensory Perception 4-->no impairment   Moisture 3-->occasionally moist   Activity 1-->bedfast   Mobility 2-->very limited   Nutrition 2-->probably inadequate   Friction and Shear 1-->problem   Jus Score 13   Pressure Injury Prevention    Check Moisture Management Pad Done   Sacral Foam Dressing Peel back sacral foam dressing, assess skin and reapply   Heel protection technique Foam dressing   Heel preventative measures Peel back dressing/boot, assess skin and reapply        Wound 07/25/24 1139 Intertrigo Right Groin   Date First Assessed/Time First Assessed: 07/25/24 1139   Primary Wound Type: Intertrigo  Side: Right  Location: Groin   Drainage Amount Small   Drainage Characteristics/Odor Sanguineous   Appearance Red   Tissue loss description Partial thickness   Care Cleansed with:;Sterile normal saline   Dressing Changed;Foam        Wound 07/25/24 1139 Ulceration Right lower Shin   Date First Assessed/Time First Assessed: 07/25/24 1139   Primary Wound Type: Ulceration  Side: Right  Orientation: lower  Location: Shin   Drainage Amount Moderate   Drainage Characteristics/Odor Serosanguineous   Appearance [place];Moist   Care Cleansed with:;Sterile normal saline   Dressing Changed;Foam   Musculoskeletal   Musculoskeletal WDL ex;mobility   General Mobility generalized weakness;significantly impaired   Gastrointestinal   GI WDL  ex;appearance/characteristics   GI Signs/Symptoms diarrhea;fecal incontinence   Stool Occurrence 3   Last Bowel Movement 07/27/24   Genitourinary   Genitourinary WDL ex;voiding ability/characteristics   Voiding Characteristics anuria;patient on hemodialysis   Safety   Safety WDL WDL   Safety Factors ID band on;upper side rails raised x 2;wheels locked;bed in low position;call light in reach   All Alarms alarm(s) activated and audible   Enhanced Safety Measures bed alarm set;family to remain at bedside   Fall Risk Assessment (every shift)   History Of Fall (W/I 3 Mos) 0-->No   Polypharmacy 3-->Yes   Central Nervous System/Psychotropic Medication 0-->No   Cardiovascular Medication 3-->Yes   Age Greater Than 65 Years 2-->Yes   Altered Elimination 2-->Yes   Cognitive Deficit 2-->Yes   Sensory Deficit 0-->No   Dizziness/Vertigo 0-->No   Depression 0-->No   Mobility Deficit/Weakness 2-->Yes   Male 0-->No   Fall Risk Score 14   ABC Risk for Fall with Injury Assessment   A= Age: Is the patient greater than or equal to 85 years old or frail due to clinical condition? No   B=Bones: Does the patient have osteoporosis, previous fracture, prolonged steroid use, or metastatic bone cancer? No   C=Coagulation Disorders: Does the patient have a bleeding disorder, either through anticoagulants or underlying clinical condition? Yes   S=recent Surgery: Is the patient post-op surgicalwith a recent lower limb amputation or recent major abdominal or thoracic surgery? No   Safety Management   Safety Promotion/Fall Prevention family to remain at bedside;medications reviewed;lighting adjusted;nonskid shoes/socks when out of bed   Medication Review/Management medications reviewed   Patient Rounds bed in low position;bed wheels locked;call light in patient/parent reach;clutter free environment maintained;placement of personal items at bedside;toileting offered;visualized patient;ID band on   Daily Care   Activity Management Rolling - L1    Activity Assistance Provided assistance, 2 people   Mobility   GEMS ([place] Early Mobility Scale) Level 1-Primary in bed activities   Positioning   Head of Bed (HOB) Positioning HOB elevated   Positioning/Transfer Devices wedge;pillows;in use   Hygiene Care   Bathing/Skin Care incontinence care;linen changed   Perineal Care absorbent brief/pad changed;perineum cleansed   Hygiene Assistance per care provider x 2

## 2024-07-27 NOTE — SUBJECTIVE & OBJECTIVE
Interval History/Significant Events: NAEON. S/p HD yesterday with removal of 2.6L. O2 requirements improving, currently on 2L NC. Persistent diarrhea, not agreeable for rectal tube at this time. LLE pain/swelling/erythema noted, LE venous doppler identified nonocclusive thrombus within the mid and distal femoral vein. Currently on heparin gtt.     Review of Systems   Respiratory:  Negative for shortness of breath.    Cardiovascular:  Positive for leg swelling. Negative for chest pain.   Gastrointestinal:  Positive for diarrhea.   Musculoskeletal:  Negative for myalgias.   All other systems reviewed and are negative.    Objective:     Vital Signs (Most Recent):  Temp: 98.1 °F (36.7 °C) (07/27/24 1303)  Pulse: 109 (07/27/24 1309)  Resp: 18 (07/27/24 0520)  BP: 123/70 (07/27/24 1303)  SpO2: 100 % (07/27/24 1303) Vital Signs (24h Range):  Temp:  [97.6 °F (36.4 °C)-99 °F (37.2 °C)] 98.1 °F (36.7 °C)  Pulse:  [] 109  Resp:  [18-20] 18  SpO2:  [94 %-100 %] 100 %  BP: (115-152)/(58-78) 123/70   Weight: 128.5 kg (283 lb 4.7 oz)  Body mass index is 44.37 kg/m².      Intake/Output Summary (Last 24 hours) at 7/27/2024 1506  Last data filed at 7/26/2024 1659  Gross per 24 hour   Intake 650 ml   Output 2650 ml   Net -2000 ml            Physical Exam  Vitals and nursing note reviewed.   Constitutional:       General: She is not in acute distress.     Appearance: She is not ill-appearing, toxic-appearing or diaphoretic.   HENT:      Head: Normocephalic and atraumatic.      Right Ear: External ear normal.      Left Ear: External ear normal.      Nose: Nose normal.   Cardiovascular:      Rate and Rhythm: Tachycardia present. Rhythm irregular.      Pulses: Normal pulses.      Heart sounds: Normal heart sounds. No murmur heard.     No friction rub. No gallop.      Comments: No JVD but with 1+ pitting edema bilaterally  Pulmonary:      Effort: Pulmonary effort is normal. No respiratory distress.      Breath sounds: Normal breath  sounds. No wheezing, rhonchi or rales.   Abdominal:      General: Abdomen is flat. Bowel sounds are normal. There is no distension.      Palpations: Abdomen is soft.      Tenderness: There is no abdominal tenderness. There is no guarding or rebound.   Musculoskeletal:         General: Swelling present. No tenderness. Normal range of motion.      Right lower leg: Edema present.      Left lower leg: Edema present.   Skin:     General: Skin is warm and dry.      Coloration: Skin is not jaundiced or pale.      Findings: Erythema (LLE) present.   Neurological:      General: No focal deficit present.      Mental Status: She is alert and oriented to person, place, and time. Mental status is at baseline.   Psychiatric:         Mood and Affect: Mood normal.         Behavior: Behavior normal.            Vents:  Vent Mode: Spont (07/16/24 1003)  Set Rate: 15 BPM (07/16/24 0903)  Vt Set: 420 mL (07/16/24 0903)  Pressure Support: 5 cmH20 (07/16/24 1003)  PEEP/CPAP: 5 cmH20 (07/16/24 1003)  Oxygen Concentration (%): 32 (07/25/24 1755)  Peak Airway Pressure: 10 cmH20 (07/16/24 1003)  Plateau Pressure: 0 cmH20 (07/16/24 1003)  Total Ve: 5.45 L/m (07/16/24 1003)  Negative Inspiratory Force (cm H2O): 0 (07/16/24 1003)  Lines/Drains/Airways       Central Venous Catheter Line  Duration                  Hemodialysis Catheter 07/11/24 1219 left subclavian 16 days    Trialysis (Dialysis) Catheter 07/20/24 1120 left femoral 7 days                  Significant Labs:    CBC/Anemia Profile:  Recent Labs   Lab 07/26/24  0612 07/27/24  0616   WBC 13.41* 14.69*   HGB 8.4* 7.4*   HCT 26.4* 23.9*   * 96*   MCV 87 87   RDW 23.1* 22.6*        Chemistries:  Recent Labs   Lab 07/26/24  0612 07/27/24  0616    139   K 4.3 3.8    110   CO2 18* 18*   BUN 32* 36*   CREATININE 4.5* 5.1*   CALCIUM 9.1 8.8   ALBUMIN 3.2* 2.4*   MG 2.1 2.0   PHOS 3.8  3.8 4.0  4.0       All pertinent labs within the past 24 hours have been  reviewed.    Significant Imaging:  I have reviewed all pertinent imaging results/findings within the past 24 hours.

## 2024-07-28 NOTE — SUBJECTIVE & OBJECTIVE
Past Medical History:   Diagnosis Date    Anticoagulant long-term use     Arthritis     Breast cancer 2014    invasive lobular carcinoma    Cancer of kidney 11/2020    RIGHT KIDNEY CANCER    CHF (congestive heart failure)     Coronary artery disease dx 2005    Depression     Diabetes mellitus     Diastolic heart failure secondary to hypertension     Gout     Hyperlipemia     Hypertension     Hypertrophy of nasal turbinates     Kidney mass 2020    Right    Levoscoliosis     Lung nodule     left    Multiple thyroid nodules     NICOLE (obstructive sleep apnea)     uses C-PAP    Pulmonary hypertension     Severe sepsis 07/01/2024       Past Surgical History:   Procedure Laterality Date    AORTOGRAPHY N/A 12/04/2020    Procedure: Aortogram;  Surgeon: Paul Pedersen MD;  Location: [facility] CATH;  Service: Cardiology;  Laterality: N/A;    BREAST SURGERY      BRONCHOSCOPY N/A 7/15/2024    Procedure: BRONCHOSCOPY, FLEXIBLE;  Surgeon: Tanya Beckham MD;  Location: [address];  Service: General;  Laterality: N/A;    CARDIAC CATHETERIZATION  12/2020    CHOLECYSTECTOMY      COLONOSCOPY      multi -last 2014     CORONARY ARTERY BYPASS GRAFT      ESOPHAGOGASTRODUODENOSCOPY      2012     HAND SURGERY Right     INSERTION OF CATHETER N/A 6/23/2024    Procedure: Insertion,catheter;  Surgeon: Meli Griffin MD;  Location: [facility] OR;  Service: Vascular;  Laterality: N/A;  ORIGINAL CATHETER WAS REPOSITIONED.  NO NEW CATHETER WAS PLACED    INSERTION OF TUNNELED CENTRAL VENOUS HEMODIALYSIS CATHETER Right 7/11/2024    Procedure: Insertion, Catheter, Central Venous, Hemodialysis;  Surgeon: Hawa Landa MD;  Location: [facility] CATH LAB;  Service: Interventional Nephrology;  Laterality: Right;    INSERTION, CATHETER, DIALYSIS, PERITONEAL N/A 6/4/2024    Procedure: IN Insertion Catheter, Dialysis, Peritoneal - laparoscopic;  Surgeon: Rodriguez Catalan Jr., MD;  Location: [facility];  Service: General;  Laterality: N/A;    LAPAROSCOPIC  "ROBOT-ASSISTED SURGICAL REMOVAL OF KIDNEY USING DA CHELLE XI Right 03/10/2022    Procedure: XI ROBOTIC NEPHRECTOMY- radical;  Surgeon: Rolando Ramirez MD;  Location: [facility] OR;  Service: Urology;  Laterality: Right;    MASTECTOMY W/ SENTINEL NODE BIOPSY Bilateral 01/21/2015    bilateral "dog ears"    NASAL SINUS SURGERY  2015    Dr Bryant FESS/cauterization turbinate     PARTIAL HYSTERECTOMY  1989    PERCUTANEOUS TRANSLUMINAL BALLOON ANGIOPLASTY OF CORONARY ARTERY  12/04/2020    Procedure: Angioplasty-coronary;  Surgeon: Paul Pedersen MD;  Location: [facility] CATH;  Service: Cardiology;;    PERITONEAL CATHETER REMOVAL N/A 7/15/2024    Procedure: REMOVAL, CATHETER, DIALYSIS, PERITONEAL;  Surgeon: Tayna Beckham MD;  Location: [facility] OR [address] [place] [address];  Service: General;  Laterality: N/A;    REMOVAL OF HEMODIALYSIS CATHETER  7/11/2024    Procedure: REMOVAL, CATHETER, HEMODIALYSIS;  Surgeon: Hawa Landa MD;  Location: [facility] CATH LAB;  Service: Interventional Nephrology;;    RENAL BIOPSY Right     9/20/2021 EJ    TUBAL LIGATION      ULTRASOUND GUIDANCE  12/04/2020    Procedure: Ultrasound Guidance;  Surgeon: Paul Pedersen MD;  Location: [facility] CATH;  Service: Cardiology;;       Review of patient's allergies indicates:   Allergen Reactions    Percocet [oxycodone-acetaminophen] Itching    Januvia [sitagliptin]     Jardiance [empagliflozin]      Leg cramps    Lipitor [atorvastatin] Other (See Comments)     Severe leg pain    Linaclotide Other (See Comments) and Nausea And Vomiting     Does not remember    Lubiprostone Other (See Comments) and Palpitations     Does not remember       Medications:  Medications Prior to Admission   Medication Sig    allopurinoL (ZYLOPRIM) 300 MG tablet Take 1 tablet (300 mg total) by mouth once daily.    amLODIPine (NORVASC) 10 MG tablet Take 10 mg by mouth once daily.    calcitRIOL (ROCALTROL) 0.5 MCG Cap Take 0.5 mcg by mouth once daily.    carvediloL (COREG) 25 MG tablet Take 1 tablet (25 mg " total) by mouth 2 (two) times daily.    cloNIDine (CATAPRES) 0.1 MG tablet Take 1 tablet (0.1 mg total) by mouth 3 (three) times daily as needed (PRN SBP > 165 mmHg).    cloNIDine 0.1 mg/24 hr td ptwk (CATAPRES) 0.1 mg/24 hr Place 1 patch onto the skin every 7 days.    cyanocobalamin (VITAMIN B-12) 100 MCG tablet Take 100 mcg by mouth once daily.    ELIQUIS 5 mg Tab TAKE 1 TABLET(5 MG) BY MOUTH TWICE DAILY    evolocumab (REPATHA SURECLICK) 140 mg/mL PnIj Inject 1 mL (140 mg total) into the skin every 14 (fourteen) days.    furosemide (LASIX) 40 MG tablet Take 40 mg by mouth once daily.    blood sugar diagnostic (TRUE METRIX GLUCOSE TEST STRIP) Strp Use 1x daily. Insurance preferred.    blood sugar diagnostic Strp To check BG 1 time daily, to use with insurance preferred meter    lancets Misc To check BG 1 times daily, to use with insurance preferred meter    loratadine (CLARITIN) 10 mg tablet Take 10 mg by mouth once daily.     Antibiotics (From admission, onward)      Start     Stop Route Frequency Ordered    07/28/24 2100  fidaxomicin tablet 200 mg  (C. difficile Infection (CDI) Treatment Order Panel)         08/07/24 2059 Oral 2 times daily 07/28/24 1359    07/13/24 2100  mupirocin 2 % ointment         07/18/24 2059 Nasl 2 times daily 07/13/24 1729          Antifungals (From admission, onward)      Start     Stop Route Frequency Ordered    07/17/24 2115  miconazole NITRATE 2 % top powder         -- Top 2 times daily 07/17/24 2014          Antivirals (From admission, onward)      None             Immunization History   Administered Date(s) Administered    Influenza 01/25/2019       Family History       Problem Relation (Age of Onset)    Asthma Daughter    Birth defects Daughter    Breast cancer Mother, Maternal Aunt    Depression Daughter    Drug abuse Daughter, Daughter    Glaucoma Sister    Hepatitis Brother    Hypertension Father    Learning disabilities Daughter    Mental illness Daughter    Stroke Father           Social History     Socioeconomic History    Marital status: Single    Number of children: 4   Occupational History    Occupation: retired Psych aid    Tobacco Use    Smoking status: Former     Current packs/day: 0.00     Types: Cigarettes     Start date: 2016     Quit date: 2016     Years since quittin.5    Smokeless tobacco: Never    Tobacco comments:     quit    Substance and Sexual Activity    Alcohol use: No    Drug use: No    Sexual activity: Not Currently     Partners: Male     Birth control/protection: None     Social Determinants of Health     Financial Resource Strain: Low Risk  (2024)    Overall Financial Resource Strain (CARDIA)     Difficulty of Paying Living Expenses: Not very hard   Recent Concern: Financial Resource Strain - Medium Risk (2024)    Overall Financial Resource Strain (CARDIA)     Difficulty of Paying Living Expenses: Somewhat hard   Food Insecurity: No Food Insecurity (2024)    Hunger Vital Sign     Worried About Running Out of Food in the Last Year: Never true     Ran Out of Food in the Last Year: Never true   Recent Concern: Food Insecurity - Food Insecurity Present (2024)    Hunger Vital Sign     Worried About Running Out of Food in the Last Year: Sometimes true     Ran Out of Food in the Last Year: Never true   Transportation Needs: No Transportation Needs (2024)    TRANSPORTATION NEEDS     Transportation : No   Physical Activity: Inactive (2024)    Exercise Vital Sign     Days of Exercise per Week: 0 days     Minutes of Exercise per Session: 0 min   Stress: Patient Unable To Answer (2024)    [employer] - Occupational Stress Questionnaire     Feeling of Stress : Patient unable to answer   Housing Stability: Low Risk  (2024)    Housing Stability Vital Sign     Unable to Pay for Housing in the Last Year: No     Homeless in the Last Year: No     Review of Systems   Constitutional:  Positive for  activity change and appetite change. Negative for chills, diaphoresis, fatigue, fever and unexpected weight change.   HENT:  Negative for congestion, ear pain, hearing loss, sore throat and tinnitus.    Eyes:  Negative for pain, redness and visual disturbance.   Respiratory:  Negative for cough, chest tightness, shortness of breath and wheezing.    Cardiovascular:  Negative for chest pain.   Gastrointestinal:  Positive for abdominal pain (At PD cath site). Negative for constipation, diarrhea, nausea and vomiting.   Endocrine: Negative for cold intolerance and heat intolerance.   Genitourinary:  Negative for decreased urine volume, difficulty urinating, dysuria, flank pain, frequency, hematuria and urgency.   Musculoskeletal:  Negative for arthralgias, back pain, myalgias and neck pain.   Skin:  Negative for rash and wound.   Allergic/Immunologic: Negative for environmental allergies, food allergies and immunocompromised state.   Neurological:  Negative for dizziness, facial asymmetry, weakness, light-headedness, numbness and headaches.   Hematological:  Negative for adenopathy. Does not bruise/bleed easily.   Psychiatric/Behavioral:  Negative for agitation, behavioral problems and confusion.      Objective:     Vital Signs (Most Recent):  Temp: 99.2 °F (37.3 °C) (07/28/24 1215)  Pulse: (!) 112 (07/28/24 1215)  Resp: 18 (07/28/24 1215)  BP: 125/63 (07/28/24 1215)  SpO2: 98 % (07/28/24 1215) Vital Signs (24h Range):  Temp:  [97.8 °F (36.6 °C)-99.5 °F (37.5 °C)] 99.2 °F (37.3 °C)  Pulse:  [] 112  Resp:  [18-20] 18  SpO2:  [96 %-100 %] 98 %  BP: (102-132)/(55-83) 125/63     Weight: 128.5 kg (283 lb 4.7 oz)  Body mass index is 44.37 kg/m².    Estimated Creatinine Clearance: 12.5 mL/min (A) (based on SCr of 5.7 mg/dL (H)).     Physical Exam  Constitutional:       General: She is not in acute distress.     Appearance: She is well-developed. She is obese. She is ill-appearing. She is not toxic-appearing or  diaphoretic.       HENT:      Head: Normocephalic and atraumatic.   Eyes:      Pupils: Pupils are equal, round, and reactive to light.   Cardiovascular:      Rate and Rhythm: Normal rate and regular rhythm.      Heart sounds: Normal heart sounds. No murmur heard.     No friction rub. No gallop.   Pulmonary:      Effort: Pulmonary effort is normal. No respiratory distress.      Breath sounds: Normal breath sounds. No wheezing or rales.   Abdominal:      General: Bowel sounds are normal. There is no distension.      Palpations: Abdomen is soft. There is no mass.      Tenderness: There is no abdominal tenderness. There is no guarding.   Musculoskeletal:      Cervical back: Normal range of motion and neck supple.   Skin:     General: Skin is warm and dry.   Neurological:      Mental Status: She is alert and oriented to person, place, and time.   Psychiatric:         Behavior: Behavior normal.          Significant Labs: Blood Culture:   Recent Labs   Lab 06/24/24 2016 06/29/24  1607 07/17/24  1737 07/17/24  1755   LABBLOO No growth after 5 days. No growth after 5 days.  No growth after 5 days. No growth after 5 days. No growth after 5 days.     CBC:   Recent Labs   Lab 07/27/24  0616 07/28/24  0636   WBC 14.69* 16.63*   HGB 7.4* 7.5*   HCT 23.9* 24.7*   PLT 96* 124*     CMP:   Recent Labs   Lab 07/27/24  0616 07/28/24  0636    138   K 3.8 4.2    109   CO2 18* 16*    112*   BUN 36* 43*   CREATININE 5.1* 5.7*   CALCIUM 8.8 9.6   ALBUMIN 2.4* 2.2*   ANIONGAP 11 13     Wound Culture:   Recent Labs   Lab 07/03/24  1530 07/10/24  1630   LABAERO No growth No growth     All pertinent labs within the past 24 hours have been reviewed.    Significant Imaging: I have reviewed all pertinent imaging results/findings within the past 24 hours.  US Lower Extremity Veins Bilateral [[id number]] (Abnormal) Resulted: 07/25/24 1536   Order Status: Completed Updated: 07/25/24 1538   Narrative:     EXAMINATION:  US LOWER  EXTREMITY VEINS BILATERAL    CLINICAL HISTORY:  pain/sweling;    TECHNIQUE:  Duplex and color flow Doppler and dynamic compression was performed of the bilateral lower extremity veins was performed.    COMPARISON:  Bilateral lower extremity venous upper ultrasound 07/23/2024    FINDINGS:  Right thigh veins: The common femoral, femoral, popliteal, upper greater saphenous, and deep femoral veins are patent and free of thrombus. The veins are normally compressible and have normal phasic flow and augmentation response.    Right calf veins: The visualized calf veins are patent.    Left thigh veins: Nonocclusive thrombus seen within the mid and distal femoral vein.  The common femoral, greater saphenous and popliteal veins appear patent.    Left calf veins: Only single peroneal and posterior tibial veins are identified whereas previous 2 patent vessels are seen on previous study 2 days prior concerning for thrombosis of nonvisualized paired vessels.  The paired anterior tibial veins are patent.    Miscellaneous: There is a 1.9 x 2.3 x 1.1 cm nonvascular hypoechoic collection at the right popliteal fossa suggestive of a Baker's cyst.   Impression:       Newly developed left lower extremity DVT from 07/23/2024 study, as above.    No evidence of DVT in the right lower extremity.    Right Washington's cyst.    This report was flagged in Epic as abnormal.      Electronically signed by: Ottoniel Valdez MD  Date: 07/25/2024  Time: 15:36   US Lower Extremity Veins Bilateral [[id number]] Resulted: 07/23/24 1152   Order Status: Completed Updated: 07/23/24 1154   Narrative:     EXAMINATION:  US LOWER EXTREMITY VEINS BILATERAL    CLINICAL HISTORY:  pain/sweling;    TECHNIQUE:  Duplex and color flow Doppler and dynamic compression was performed of the bilateral lower extremity veins was performed.    COMPARISON:  None    FINDINGS:  Right thigh veins: The common femoral, femoral, popliteal, upper greater saphenous, and deep femoral veins are  patent and free of thrombus. The veins are normally compressible and have normal phasic flow and augmentation response.    Right calf veins: The visualized calf veins are patent.    Left thigh veins: The common femoral, femoral, popliteal, upper greater saphenous, and deep femoral veins are patent and free of thrombus. The veins are normally compressible and have normal phasic flow and augmentation response.    Left calf veins: The visualized calf veins are patent.    Miscellaneous: Right complicated popliteal fossa cyst measuring 4.9 x 1.6 x 3.4 cm.   Impression:       No evidence of deep venous thrombosis in either lower extremity.      Electronically signed by: Ambrosio Puentes MD  Date: 07/23/2024  Time: 11:52      Imaging History     2024    Date Procedure Name Study Review Link PACS Link Status Accession Number Location   07/27/24 07:18 PM Cardiac monitoring strips Study Review  Final     07/27/24 04:29 PM Cardiac monitoring strips Study Review  Final     07/26/24 04:12 PM Cardiac monitoring strips Study Review  Final     07/25/24 03:24 PM US Lower Extremity Veins Bilateral Study Review  Images Final [id number] [facility]   07/25/24 05:14 AM Cardiac monitoring strips Study Review  Final     07/24/24 05:49 AM Cardiac monitoring strips Study Review  Final     07/23/24 11:45 AM US Lower Extremity Veins Bilateral Study Review  Images Final [id number] [facility]   07/23/24 02:35 AM Cardiac monitoring strips Study Review  Final     07/22/24 04:54 PM Cardiac monitoring strips Study Review  Final     07/21/24 10:58 PM Cardiac monitoring strips Study Review  Final     07/21/24 09:52 AM Cardiac monitoring strips Study Review  Final     07/21/24 01:48 AM Cardiac monitoring strips Study Review  Final     07/20/24 03:15 PM Cardiac monitoring strips Study Review  Final     07/20/24 10:17 AM Cardiac monitoring strips Study Review  Final     07/20/24 08:37 AM Cardiac monitoring strips Study Review  Final     07/20/24 08:36 AM Cardiac  monitoring strips Study Review  Final     07/19/24 04:42 AM Cardiac monitoring strips Study Review  Final     07/17/24 08:06 PM Cardiac monitoring strips Study Review  Final     07/17/24 12:36 PM Cardiac monitoring strips Study Review  Final     07/15/24 05:38 PM X-Ray Chest AP Portable Study Review  Images Final [id number] [facility]   07/15/24 03:11 PM Cardiac monitoring strips Study Review  Final     07/15/24 04:50 AM Cardiac monitoring strips Study Review  Final     07/14/24 04:36 AM Cardiac monitoring strips Study Review  Final     07/12/24 05:38 PM X-Ray Chest 1 View Study Review  Images Final [id number] [facility]   07/12/24 05:56 AM Cardiac monitoring strips Study Review  Final     07/11/24 12:32 PM Cardiac catheterization Study Review  Images Final [id number] [facility]   07/10/24 07:23 PM Cardiac monitoring strips Study Review  Final     07/10/24 08:00 AM Cardiac monitoring strips Study Review  Final     07/09/24 07:25 PM Cardiac monitoring strips Study Review  Final     07/09/24 11:31 AM CT Head Without Contrast Study Review  Images Final [id number] [facility]   07/09/24 08:37 AM Cardiac monitoring strips Study Review  Final     07/08/24 10:56 PM X-Ray Chest AP Portable Study Review  Images Final [id number] [facility]   07/07/24 09:26 PM Cardiac monitoring strips Study Review  Final     07/07/24 09:25 AM Cardiac monitoring strips Study Review  Final     07/07/24 09:25 AM Cardiac monitoring strips Study Review  Final     07/06/24 11:00 PM Cardiac monitoring strips Study Review  Final     07/04/24 10:53 PM Cardiac monitoring strips Study Review  Final     07/04/24 03:13 PM X-Ray Chest AP Portable Study Review  Images Final [id number] [facility]   07/04/24 01:02 AM Cardiac monitoring strips Study Review  Final     07/03/24 02:22 PM Echo Study Review  Images Final [id number] [facility]   07/03/24 06:08 AM Cardiac monitoring strips Study Review  Final     07/02/24 08:29 PM US Upper Extremity Veins Bilateral Study Review  Images Final [id number] [facility]   06/29/24  01:07 PM US Carotid Bilateral Study Review  Images Final [id number] [facility]   06/29/24 01:07 PM US Lower Extremity Veins Bilateral Study Review  Images Final [id number] [facility]   06/28/24 05:18 AM X-Ray Chest AP Portable Study Review  Images Final [id number] [facility]   06/27/24 04:24 AM X-Ray Chest AP Portable Study Review  Images Final [id number] [facility]

## 2024-07-28 NOTE — ASSESSMENT & PLAN NOTE
Diagnosed 7/22/24.  Worsening leukocytosis and persistent diarrhea on PO vancomycin  Rectal tube placed  ID consulted  Stop p.o. vancomycin and start fidaxomicin  CT abd/pelvis ordered to eval for worsening colitis  Cholestyramine for a binder of fidaxomicin  GI consult as patient may need fecal transplant in the future

## 2024-07-28 NOTE — PLAN OF CARE
Problem: Adult Inpatient Plan of Care  Goal: Plan of Care Review  Outcome: Progressing  Goal: Patient-Specific Goal (Individualized)  Outcome: Progressing  Goal: Absence of Hospital-Acquired Illness or Injury  Outcome: Progressing  Goal: Optimal Comfort and Wellbeing  Outcome: Progressing  Goal: Readiness for Transition of Care  Outcome: Progressing     Problem: Diabetes Comorbidity  Goal: Blood Glucose Level Within Targeted Range  Outcome: Progressing     Problem: Sepsis/Septic Shock  Goal: Optimal Coping  Outcome: Progressing  Goal: Absence of Bleeding  Outcome: Progressing  Goal: Blood Glucose Level Within Targeted Range  Outcome: Progressing  Goal: Absence of Infection Signs and Symptoms  Outcome: Progressing  Goal: Optimal Nutrition Intake  Outcome: Progressing     Problem: Bariatric Environmental Safety  Goal: Safety Maintained with Care  Outcome: Progressing     Problem: Wound  Goal: Optimal Coping  Outcome: Progressing  Goal: Optimal Functional Ability  Outcome: Progressing  Goal: Absence of Infection Signs and Symptoms  Outcome: Progressing  Goal: Improved Oral Intake  Outcome: Progressing  Goal: Optimal Pain Control and Function  Outcome: Progressing  Goal: Skin Health and Integrity  Outcome: Progressing  Goal: Optimal Wound Healing  Outcome: Progressing     Problem: Skin Injury Risk Increased  Goal: Skin Health and Integrity  Outcome: Progressing     Problem: Fall Injury Risk  Goal: Absence of Fall and Fall-Related Injury  Outcome: Progressing     Problem: Coping Ineffective  Goal: Effective Coping  Outcome: Progressing     Problem: CRRT (Continuous Renal Replacement Therapy)  Goal: Safe, Effective Therapy Delivery  Outcome: Progressing  Goal: Hemodynamic Stability  Outcome: Progressing  Goal: Body Temperature Maintained in Desired Range  Outcome: Progressing  Goal: Absence of Infection Signs and Symptoms  Outcome: Progressing     Problem: Infection  Goal: Absence of Infection Signs and  Symptoms  Outcome: Progressing     Problem: Hemodialysis  Goal: Safe, Effective Therapy Delivery  Outcome: Progressing  Goal: Effective Tissue Perfusion  Outcome: Progressing  Goal: Absence of Infection Signs and Symptoms  Outcome: Progressing     Problem: Mechanical Ventilation Invasive  Goal: Effective Communication  Outcome: Progressing  Goal: Optimal Device Function  Outcome: Progressing  Goal: Mechanical Ventilation Liberation  Outcome: Progressing  Goal: Optimal Nutrition Delivery  Outcome: Progressing  Goal: Absence of Device-Related Skin and Tissue Injury  Outcome: Progressing  Goal: Absence of Ventilator-Induced Lung Injury  Outcome: Progressing     Problem: Artificial Airway  Goal: Effective Communication  Outcome: Progressing  Goal: Optimal Device Function  Outcome: Progressing  Goal: Absence of Device-Related Skin or Tissue Injury  Outcome: Progressing

## 2024-07-28 NOTE — PROGRESS NOTES
[facility] - Stepdown Formerly [facility] ([place] [address])  [facility]  Progress Note    Patient Name: Juhi Taylor  MRN: [id number]  Patient Class: IP- Inpatient   Admission Date: 7/3/2024  Length of Stay: 25 days  Attending Physician: Bubba Ochoa MD  Primary Care Provider: Tony Sadler MD        Subjective:     Principal Problem:ESRD (end stage renal disease)        HPI:  Juhi Taylor is a 74-year-old female with a past medical history of CKD stage 5 with recent PD catheter placement on 6/4, RCC s/p right nephrectomy, T2DM, obesity, CAD s/p PCI to Lcx and LAD in 11/2020 and HFmREF who is admitted as a transfer from [facility] for higher level of care and further cardiac evaluation. Nephrology consulted for hemodialysis. She initially presented on 6/18/24 to [facility] for hypervolemia in the setting of CKD 4-5 for which she had undergone elective PD catheter placement complicated by catheter site leaking. She had a complex hospital course, was initially placed on furosemide gtt without significant improvement and later underwent placement of tunneled line for HD with volume removal. During her second dialysis session she went into PEA arrest for which she was resuscitated, intubated and transferred to ICU. She was noted to have elevated troponin and subsequent TTE found reduced EF from 40-45% to 20-25% for which coronary angiography was recommended. Her hospital course was also complicated by dialysis line infection, significant bleeding around the catheter site, bradycardia and subsequent atrial fibrillation with RVR. Given the catheter site bleeding, anticoagulation was temporarily held. She has reported allergy to amiodarone, was initially placed on diltiazem for atrial fibrillation despite reduced EF. She discussed GOC at outside hospital where it appears that she lacked capacity per note and decided [place] [facility] At that time, gen surgery was planning to place a left tunneled  catheter due to concerns of the right side not being suitable     Upon admit, patient appeared drowsy but conversant. Daughter reports that she typically gets lethargic 2/2 to the volume overload due to not receiving dialysis. Patient reported b/l left lower extremity pain. Daughter concerned about her not having dialysis in 4 days and wants a session today.      Pt initially refusing dialysis. Seen by nephrology. VBG 7.220/33.4/34/13.7/-14. Critical care medicine consulted for emergent trialysis line placement and dialysis.     Overview/Hospital Course:  Ms. Taylor was admitted to MICU on 7/8 for urgent dialysis line placement and initiated of HD. Patient received short run of CRRT overnight without issue. Encephalopathy improving with continued CRRT treatments. Persistent AFib with RVR, received Digoxin loading dose (renally dosed), restarted PO Carvedilol. Patient remains in persistent AFib RVR despite therapeutic digoxin level and uptitration of Carvedilol levels, started on Amiodarone infusion overnight. Transitioned to amio PO, started on heparin gtt for afib AC. Tolerated HD well overnight without complications and less encephalopathic upon exam. Planning for gen surg to remove PD catheter. Upon induction pt vomited copious amounts of bile, was intubated. PD cathter removed successfully. Extubated to NC 7/16.      7/18:  Patient made DNR after Palliative Care discussions yesterday.  Tolerated SLED 12 hours overnight.  Discussed difficulties regarding HD line with Interventional Nephrology and plans to replace it next week if patient it amenable.  Patient was stepped down from ICU.  7/19:  Stepped back up to medical ICU with plans for overnight SLED x 3 days.     On 7/19 Nephrology requested that the patient be stepped back up to the medical ICU for SLED. A temporary trialysis HD catheter was placed on 7/20/24 in the groin. She also began having diarrhea, which tested positive for Clostridium difficile so  she was started on PO vancomycin 7/22/24. HD trial on 7/24; remained HDS. Ready for stepdown.    Interval History/Significant Events: NAEON. Worsening leukocytosis and persistent diarrhea, rectal tube placed overnight. Afebrile and denies abd pain apart from PD catheter site.    Review of Systems   Respiratory:  Negative for shortness of breath.    Cardiovascular:  Positive for leg swelling. Negative for chest pain.   Gastrointestinal:  Positive for diarrhea.   Musculoskeletal:  Negative for myalgias.   All other systems reviewed and are negative.    Objective:     Vital Signs (Most Recent):  Temp: 99.3 °F (37.4 °C) (07/28/24 1555)  Pulse: 95 (07/28/24 1555)  Resp: 20 (07/28/24 1555)  BP: (!) 140/62 (07/28/24 1555)  SpO2: 97 % (07/28/24 1555) Vital Signs (24h Range):  Temp:  [97.8 °F (36.6 °C)-99.5 °F (37.5 °C)] 99.3 °F (37.4 °C)  Pulse:  [] 95  Resp:  [18-20] 20  SpO2:  [96 %-100 %] 97 %  BP: (102-140)/(55-83) 140/62   Weight: 128.5 kg (283 lb 4.7 oz)  Body mass index is 44.37 kg/m².      Intake/Output Summary (Last 24 hours) at 7/28/2024 1630  Last data filed at 7/27/2024 2037  Gross per 24 hour   Intake 120 ml   Output --   Net 120 ml            Physical Exam  Vitals and nursing note reviewed.   Constitutional:       General: She is not in acute distress.     Appearance: She is not ill-appearing, toxic-appearing or diaphoretic.   HENT:      Head: Normocephalic and atraumatic.      Right Ear: External ear normal.      Left Ear: External ear normal.      Nose: Nose normal.   Cardiovascular:      Rate and Rhythm: Tachycardia present. Rhythm irregular.      Pulses: Normal pulses.      Heart sounds: Normal heart sounds. No murmur heard.     No friction rub. No gallop.      Comments: No JVD but with 1+ pitting edema bilaterally  Pulmonary:      Effort: Pulmonary effort is normal. No respiratory distress.      Breath sounds: Normal breath sounds. No wheezing, rhonchi or rales.   Abdominal:      General: Abdomen is  flat. Bowel sounds are normal. There is no distension.      Palpations: Abdomen is soft.      Tenderness: There is no abdominal tenderness. There is no guarding or rebound.   Musculoskeletal:         General: Swelling present. No tenderness. Normal range of motion.      Right lower leg: Edema present.      Left lower leg: Edema present.   Skin:     General: Skin is warm and dry.      Coloration: Skin is not jaundiced or pale.      Findings: Erythema (LLE) present.   Neurological:      General: No focal deficit present.      Mental Status: She is alert and oriented to person, place, and time. Mental status is at baseline.   Psychiatric:         Mood and Affect: Mood normal.         Behavior: Behavior normal.            Vents:  Vent Mode: Spont (07/16/24 1003)  Set Rate: 15 BPM (07/16/24 0903)  Vt Set: 420 mL (07/16/24 0903)  Pressure Support: 5 cmH20 (07/16/24 1003)  PEEP/CPAP: 5 cmH20 (07/16/24 1003)  Oxygen Concentration (%): 32 (07/25/24 1755)  Peak Airway Pressure: 10 cmH20 (07/16/24 1003)  Plateau Pressure: 0 cmH20 (07/16/24 1003)  Total Ve: 5.45 L/m (07/16/24 1003)  Negative Inspiratory Force (cm H2O): 0 (07/16/24 1003)  Lines/Drains/Airways       Central Venous Catheter Line  Duration                  Hemodialysis Catheter 07/11/24 1219 left subclavian 17 days    Trialysis (Dialysis) Catheter 07/20/24 1120 left femoral 8 days                  Significant Labs:    CBC/Anemia Profile:  Recent Labs   Lab 07/27/24  0616 07/28/24  0636   WBC 14.69* 16.63*   HGB 7.4* 7.5*   HCT 23.9* 24.7*   PLT 96* 124*   MCV 87 86   RDW 22.6* 22.4*        Chemistries:  Recent Labs   Lab 07/27/24  0616 07/28/24  0636    138   K 3.8 4.2    109   CO2 18* 16*   BUN 36* 43*   CREATININE 5.1* 5.7*   CALCIUM 8.8 9.6   ALBUMIN 2.4* 2.2*   MG 2.0 2.0   PHOS 4.0  4.0 4.4  4.4       All pertinent labs within the past 24 hours have been reviewed.    Significant Imaging:  I have reviewed all pertinent imaging results/findings  within the past 24 hours.    Assessment/Plan:      * ESRD (end stage renal disease)  Pt previously on PD. Unable to remove enough volume with PD catheter. Initially upgraded to MICU for emergent trialysis placement and CRRT. Now with tunneled dialysis catheter. Tolerated session of HD on 7/12 without complications.      - Nephrology following, appreciate recs  - Strict I/Os  - Renally dose meds  - Avoid nephrotoxic agents  - PD catheter removed 07/15  - Transferred to MICU for SLED  - Tunneled dialysis catheter is not functioning.   - Trialysis catheter placed in left femoral on 7/20.  - Tolerating iHD  - Received albumin 25 g q8h x 3 days per Nephrology, ending 7/25   - Planning for placement of new tunneled HD catheter next week    Acute deep vein thrombosis (DVT) of femoral vein of left lower extremity  LE venous doppler identified nonocclusive thrombus within the mid and distal femoral vein.   Cont heparin gtt.       Clostridium difficile infection  Diagnosed 7/22/24.  Worsening leukocytosis and persistent diarrhea on PO vancomycin  Rectal tube placed  ID consulted  Stop p.o. vancomycin and start fidaxomicin  CT abd/pelvis ordered to eval for worsening colitis  Cholestyramine for a binder of fidaxomicin  GI consult as patient may need fecal transplant in the future      Diarrhea  +C.diff,       Debility  PT/OT    Palliative care encounter  - Palliative care following, continued GOC with family.   - Patient has requested code status changed to DNR      ACP (advance care planning)  --Palliative care consulted, appreciate their assistance in clarification of GOC/POC with patient and family.      Atrial fibrillation with RVR  --AFib with RVR appears new following cardiac arrest at OSH.   --Patient endorses amiodarone reaction with itching, rash, and oral burning. Refused Sotalol, DCCV, and AICD placement at OSH.   --Was placed on Diltizem infusion and transitioned to PO metoprolol. Amio gtt restarted overnight 7/11  s/t uncontrolled afib with RVR. Transitioned to PO amio.  --Patient's rate remained elevated despite amiodarone. Appeared to respond better to metoprolol.   --Continue Toprol 50 mg daily.   --Apixaban on hold. Anticoagulation with a heparin infusion.      Acute on chronic combined systolic and diastolic heart failure  Echo    Left Ventricle: The left ventricle is moderately dilated. Normal wall thickness. Global hypokinesis present. There is severely reduced systolic function with a visually estimated ejection fraction of 20 - 25%.    Right Ventricle: Mild right ventricular enlargement. Wall thickness is normal. Right ventricle wall motion has global hypokinesis. Systolic function is moderately reduced.    Left Atrium: Left atrium is severely dilated.    Right Atrium: Right atrium is severely dilated.    Aortic Valve: The aortic valve is a trileaflet valve. There is mild aortic valve sclerosis.    Mitral Valve: There is mild regurgitation.    Tricuspid Valve: There is severe regurgitation.    Pulmonary Artery: There is moderate pulmonary hypertension. The estimated pulmonary artery systolic pressure is 61 mmHg.    IVC/SVC: Elevated venous pressure at 15 mmHg.    Pericardium: There is a trivial circumferential effusion. No indication of cardiac tamponade.     - Will require interventional cardiology for LHC vs PET for reduced EF once euvolemic  - LifeVest prior to discharge  - Continue Entresto 24-26mg BID per cardiology recs            Severe obesity (BMI >= 40)  Body mass index is 44.37 kg/m². Morbid obesity complicates all aspects of disease management from diagnostic modalities to treatment. Weight loss encouraged and health benefits explained to patient.         Type 2 diabetes mellitus with microalbuminuria, without long-term current use of insulin  --Latest A1C 6.8; takes metformin at home.   --Target -180. Within goal.   --Continue to monitor.       Essential hypertension  --Takes carvedilol +  clonidine at home.   --Hold antihypertensives due to hypotension.   --Continue metoprolol for rate control.       VTE Risk Mitigation (From admission, onward)           Ordered     heparin 25,000 units in dextrose 5% (100 units/ml) IV bolus from bag LOW INTENSITY nomogram - [facility]  As needed (PRN)        Question:  Heparin Infusion Adjustment (DO NOT MODIFY ANSWER)  Answer:  [email or web address]\Images\Pharmacy\HeparinInfusions\heparin LOW INTENSITY nomogram for [facility] [id number]    07/21/24 1236     heparin 25,000 units in dextrose 5% (100 units/ml) IV bolus from bag LOW INTENSITY nomogram - [facility]  As needed (PRN)        Question:  Heparin Infusion Adjustment (DO NOT MODIFY ANSWER)  Answer:  [email or web address]\Images\Pharmacy\HeparinInfusions\heparin LOW INTENSITY nomogram for [facility] [id number]    07/21/24 1236     heparin 25,000 units in dextrose 5% 250 mL (100 units/mL) infusion LOW INTENSITY nomogram - [facility]  Continuous        Question:  Begin at (units/kg/hr)  Answer:  12    07/21/24 1236     heparin 25,000 units in dextrose 5% 250 mL (100 units/mL) infusion (heparin infusion - NO NOMOGRAM)  Continuous         07/20/24 1217     Place sequential compression device  Until discontinued         07/15/24 1330     IP VTE HIGH RISK PATIENT  Once         07/15/24 1330                    Discharge Planning   HARINI: 7/29/2024     Code Status: DNR   Is the patient medically ready for discharge?:     Reason for patient still in hospital (select all that apply): Treatment  Discharge Plan A: Skilled Nursing Facility   Discharge Delays: (!) Procedure Scheduling (IR, OR, Labs, Echo, Cath, Echo, EEG)              Bubba Ochoa MD  Department of Hospital Medicine   [facility] - Stepdown Flex ([place]-14)

## 2024-07-28 NOTE — ASSESSMENT & PLAN NOTE
ESRD - was briefly on PD then HD, having issues w tunnel cath and now w trialysis in place in [facility]. S/p R nephrectomy RCC  C diff positive - on oral vanc  HTN  DM  Afib w rvr  CHF w ischemic cardiomyopathy  S/p cardiac arrest     Plan/Recommendations  -Most recent HD on 7/26. Tentatively planning for HD w removal of 2 L on 7/29 to get her on MWF schedule. Noted to have worsened metabolic acidosis w increase in BUN and Cr up to 43 and 5.7, so we could consider HD today. On heparin drip therapeutic AC.   -Planning for replacement of tunnel cath w interventional nephrology likely next week on Tuesday or Wednesday w Dr Landa.  -Tunnel cath placed 7/11 - arterial port suctioning on vessel wall, trialysis placed.   -Renally adjust medications  -Pre and Post-HD weights   -Continue to monitor intake and output

## 2024-07-28 NOTE — PROGRESS NOTES
[facility] - Stepdown Flex ([place] [address])  Nephrology  Progress Note    Patient Name: Juhi Taylor  MRN: [id number]  Admission Date: 7/3/2024  Hospital Length of Stay: 25 days  Attending Provider: Bubba Ochoa MD   Primary Care Physician: Tony Sadler MD  Principal Problem:ESRD (end stage renal disease)    Subjective:     HPI: 74-year-old female with prior history of CKD stage 5 with recent PD catheter placement on 6/4, RCC s/p right nephrectomy, T2DM, obesity, CAD s/p PCI to Lcx and LAD in 11/2020 and HFmREF who is admitted as a transfer from [facility] for higher level of care and further cardiac evaluation. Nephrology consulted for hemodialysis.     She initially presented on 6/18/24 to [facility] for hypervolemia in the setting of CKD 4-5 for which she had undergone elective PD catheter placement complicated by catheter site leaking. She had a complex hospital course, was initially placed on furosemide gtt without significant improvement and later underwent placement of tunneled line for HD with volume removal. During her second dialysis session she went into PEA arrest for which she was resuscitated, intubated and transferred to ICU. She was noted to have elevated troponin and subsequent TTE found reduced EF from 40-45% to 20-25% for which coronary angiography was recommended. Her hospital course was also complicated by dialysis line infection, significant bleeding around the catheter site, bradycardia and subsequent atrial fibrillation with RVR. Given the catheter site bleeding, anticoagulation was temporarily held. She has reported allergy to amiodarone, was initially placed on diltiazem for atrial fibrillation despite reduced EF. She discussed GOC at outside hospital where it appears that she lacked capacity per note and decided to pursue [facility] level [facility]. At that time, gen surgery was planning to place a left tunneled catheter due to concerns of the right side not being suitable    Upon  admit, patient appeared drowsy but conversant. Daughter reports that she typically gets lethargic 2/2 to the volume overload due to not receiving dialysis. Patient reported b/l left lower extremity pain. Daughter concerned about her not having dialysis in 4 days and wants a session today.     Interval History: Juhi is sitting up in bed this morning, rectal tube placed since her diarrhea was so severe.     Review of patient's allergies indicates:   Allergen Reactions    Percocet [oxycodone-acetaminophen] Itching    Januvia [sitagliptin]     Jardiance [empagliflozin]      Leg cramps    Lipitor [atorvastatin] Other (See Comments)     Severe leg pain    Linaclotide Other (See Comments) and Nausea And Vomiting     Does not remember    Lubiprostone Other (See Comments) and Palpitations     Does not remember     Current Facility-Administered Medications   Medication Frequency    0.9%  NaCl infusion (for blood administration) Q24H PRN    acetaminophen tablet 650 mg Q6H PRN    dextromethorphan-guaiFENesin  mg/5 ml liquid 5 mL Q4H PRN    dextrose 10% bolus 125 mL 125 mL PRN    dextrose 10% bolus 250 mL 250 mL PRN    diphenhydrAMINE capsule 25 mg Q6H PRN    ferric gluconate (FERRLECIT) 125 mg in 0.9% NaCl 100 mL IVPB Daily    glucagon (human recombinant) injection 1 mg PRN    glucose chewable tablet 16 g PRN    glucose chewable tablet 24 g PRN    heparin 25,000 units in dextrose 5% (100 units/ml) IV bolus from bag LOW INTENSITY nomogram - [facility] PRN    heparin 25,000 units in dextrose 5% (100 units/ml) IV bolus from bag LOW INTENSITY nomogram - [facility] PRN    heparin 25,000 units in dextrose 5% 250 mL (100 units/mL) infusion LOW INTENSITY nomogram - [facility] Continuous    insulin aspart U-100 pen 0-10 Units QID (AC + HS) PRN    LIDOcaine 5 % patch 2 patch Q24H    metoprolol injection 5 mg Q6H PRN    metoprolol tartrate (LOPRESSOR) tablet 25 mg BID    miconazole NITRATE 2 % top powder BID    midodrine tablet 10 mg Daily PRN     naloxone 0.4 mg/mL injection 0.02 mg PRN    ondansetron injection 4 mg Q6H PRN    simethicone chewable tablet 80 mg TID PRN    sodium chloride 0.9% flush 10 mL PRN    traMADoL tablet 50 mg Q8H PRN    vancomycin 125 mg/5 mL oral solution 125 mg Q6H       Objective:     Vital Signs (Most Recent):  Temp: 99.5 °F (37.5 °C) (07/28/24 0830)  Pulse: 101 (07/28/24 0830)  Resp: 20 (07/28/24 0830)  BP: (!) 108/55 (07/28/24 0830)  SpO2: 100 % (07/28/24 0830) Vital Signs (24h Range):  Temp:  [97.8 °F (36.6 °C)-99.5 °F (37.5 °C)] 99.5 °F (37.5 °C)  Pulse:  [] 101  Resp:  [18-20] 20  SpO2:  [96 %-100 %] 100 %  BP: (102-132)/(55-83) 108/55     Weight: 128.5 kg (283 lb 4.7 oz) (07/24/24 1247)  Body mass index is 44.37 kg/m².  Body surface area is 2.46 meters squared.    I/O last 3 completed shifts:  In: 120 [P.O.:120]  Out: -      Physical Exam  Constitutional:       General: She is not in acute distress.     Appearance: Normal appearance. She is obese.   HENT:      Head: Normocephalic and atraumatic.      Mouth/Throat:      Mouth: Mucous membranes are moist.      Pharynx: Oropharynx is clear.   Cardiovascular:      Rate and Rhythm: Normal rate and regular rhythm.      Heart sounds: Normal heart sounds.   Pulmonary:      Effort: Pulmonary effort is normal.      Breath sounds: Normal breath sounds.      Comments: Wearing nasal cannula  Abdominal:      Palpations: Abdomen is soft.   Musculoskeletal:      Right lower leg: Edema (2+) present.      Left lower leg: Edema (2+) present.      Comments: Small amount of weeping in lower extremities.    Skin:     General: Skin is warm and dry.   Neurological:      General: No focal deficit present.      Mental Status: She is alert. She is disoriented.      Motor: Weakness present.          Significant Labs:  All labs within the past 24 hours have been reviewed.     Significant Imaging:  All imaging with the past 24 hours have been reviewed.  Assessment/Plan:     Renal/  * ESRD (end  stage renal disease)  ESRD - was briefly on PD then HD, having issues w tunnel cath and now w trialysis in place in [facility]. S/p R nephrectomy RCC  C diff positive - on oral vanc  HTN  DM  Afib w rvr  CHF w ischemic cardiomyopathy  S/p cardiac arrest     Plan/Recommendations  -Most recent HD on 7/26. Tentatively planning for HD w removal of 2 L on 7/29 to get her on MWF schedule. Noted to have worsened metabolic acidosis w increase in BUN and Cr up to 43 and 5.7, so we could consider HD today. On heparin drip therapeutic AC.   -Planning for replacement of tunnel cath w interventional nephrology likely next week on Tuesday or Wednesday w Dr Landa.  -Tunnel cath placed 7/11 - arterial port suctioning on vessel wall, trialysis placed.   -Renally adjust medications  -Pre and Post-HD weights   -Continue to monitor intake and output         Thank you for your consult. I will follow-up with patient. Please contact us if you have any additional questions.    Paulo Alicea MD  Nephrology  [facility] - Stepdown Flex ([place])

## 2024-07-28 NOTE — SUBJECTIVE & OBJECTIVE
Interval History: Juhi is sitting up in bed this morning, rectal tube placed since her diarrhea was so severe.     Review of patient's allergies indicates:   Allergen Reactions    Percocet [oxycodone-acetaminophen] Itching    Januvia [sitagliptin]     Jardiance [empagliflozin]      Leg cramps    Lipitor [atorvastatin] Other (See Comments)     Severe leg pain    Linaclotide Other (See Comments) and Nausea And Vomiting     Does not remember    Lubiprostone Other (See Comments) and Palpitations     Does not remember     Current Facility-Administered Medications   Medication Frequency    0.9%  NaCl infusion (for blood administration) Q24H PRN    acetaminophen tablet 650 mg Q6H PRN    dextromethorphan-guaiFENesin  mg/5 ml liquid 5 mL Q4H PRN    dextrose 10% bolus 125 mL 125 mL PRN    dextrose 10% bolus 250 mL 250 mL PRN    diphenhydrAMINE capsule 25 mg Q6H PRN    ferric gluconate (FERRLECIT) 125 mg in 0.9% NaCl 100 mL IVPB Daily    glucagon (human recombinant) injection 1 mg PRN    glucose chewable tablet 16 g PRN    glucose chewable tablet 24 g PRN    heparin 25,000 units in dextrose 5% (100 units/ml) IV bolus from bag LOW INTENSITY nomogram - [facility] PRN    heparin 25,000 units in dextrose 5% (100 units/ml) IV bolus from bag LOW INTENSITY nomogram - [facility] PRN    heparin 25,000 units in dextrose 5% 250 mL (100 units/mL) infusion LOW INTENSITY nomogram - [facility] Continuous    insulin aspart U-100 pen 0-10 Units QID (AC + HS) PRN    LIDOcaine 5 % patch 2 patch Q24H    metoprolol injection 5 mg Q6H PRN    metoprolol tartrate (LOPRESSOR) tablet 25 mg BID    miconazole NITRATE 2 % top powder BID    midodrine tablet 10 mg Daily PRN    naloxone 0.4 mg/mL injection 0.02 mg PRN    ondansetron injection 4 mg Q6H PRN    simethicone chewable tablet 80 mg TID PRN    sodium chloride 0.9% flush 10 mL PRN    traMADoL tablet 50 mg Q8H PRN    vancomycin 125 mg/5 mL oral solution 125 mg Q6H       Objective:     Vital Signs (Most  Recent):  Temp: 99.5 °F (37.5 °C) (07/28/24 0830)  Pulse: 101 (07/28/24 0830)  Resp: 20 (07/28/24 0830)  BP: (!) 108/55 (07/28/24 0830)  SpO2: 100 % (07/28/24 0830) Vital Signs (24h Range):  Temp:  [97.8 °F (36.6 °C)-99.5 °F (37.5 °C)] 99.5 °F (37.5 °C)  Pulse:  [] 101  Resp:  [18-20] 20  SpO2:  [96 %-100 %] 100 %  BP: (102-132)/(55-83) 108/55     Weight: 128.5 kg (283 lb 4.7 oz) (07/24/24 1247)  Body mass index is 44.37 kg/m².  Body surface area is 2.46 meters squared.    I/O last 3 completed shifts:  In: 120 [P.O.:120]  Out: -      Physical Exam  Constitutional:       General: She is not in acute distress.     Appearance: Normal appearance. She is obese.   HENT:      Head: Normocephalic and atraumatic.      Mouth/Throat:      Mouth: Mucous membranes are moist.      Pharynx: Oropharynx is clear.   Cardiovascular:      Rate and Rhythm: Normal rate and regular rhythm.      Heart sounds: Normal heart sounds.   Pulmonary:      Effort: Pulmonary effort is normal.      Breath sounds: Normal breath sounds.      Comments: Wearing nasal cannula  Abdominal:      Palpations: Abdomen is soft.   Musculoskeletal:      Right lower leg: Edema (2+) present.      Left lower leg: Edema (2+) present.      Comments: Small amount of weeping in lower extremities.    Skin:     General: Skin is warm and dry.   Neurological:      General: No focal deficit present.      Mental Status: She is alert. She is disoriented.      Motor: Weakness present.          Significant Labs:  All labs within the past 24 hours have been reviewed.     Significant Imaging:  All imaging with the past 24 hours have been reviewed.

## 2024-07-28 NOTE — ASSESSMENT & PLAN NOTE
74-year-old female with multiple comorbidities transferred from [facility] for higher level of care.  She is on dialysis and had recent antibiotics due to, what she reports as a superficial skin infection.  She reports having diarrhea for 3 weeks but it has worsened recently.  C diff testing on 07/21 was positive and she was started on p.o. vancomycin appropriately.  Her diarrhea has failed to resolve.  She has had ongoing leukocytosis which is not resolving.  This is concerning for complicated C diff. She is afebrile and appears stable.  She is currently on a PPI.    Plan:  Stop p.o. vancomycin and start fidaxomicin  Recommend CT abdomen to assess for any sequelae of C diff or other pathology  Stop PPI  Recommend p.o. cholestyramine for a binder at a distant time from administration of fidaxomicin  Recommend GI consult as patient may need fecal transplant in the future  Discussed with ID staff, we will follow

## 2024-07-28 NOTE — HPI
Patient is a 74-year-old female with a past medical history of CKD stage 5, RCC status post right nephrectomy, type 2 diabetes, obesity, CAD, HF, PD catheter placement on 6/4.  She was transferred from [facility] after she presented there on 06/18/24 as for catheter site was leaking and she was fluid overloaded.  Her hospital course was complicated by PEA arrest and resuscitation.  She was found to have elevated troponins.  It was also complicated by dialysis line infection which she reports was superficial.  Patient was transferred to [facility] [place], [place] for higher level of care.  She has had diarrhea and C diff testing was positive on 07/21/2024 and she was started on oral vancomycin 125 mg p.o. q.6 H. she has continued to have diarrhea so ID was consulted.    Patient chart review shows ongoing leukocytosis with currently at 16.  Patient seen and reports ongoing diarrhea in the rectal tube.  She has pain at her PD cath site but denies any diffuse abdominal pain.  Prior blood and stool cultures are negative.  She does not feel well but denies any fever, chills, sweating.  She clarifies that the diarrhea has been ongoing for three or so weeks but has worsened recently.

## 2024-07-28 NOTE — CONSULTS
[facility]  Infectious Disease  Consult Note    Patient Name: Juhi Taylor  MRN: [id number]  Admission Date: 7/3/2024  Hospital Length of Stay: 25 days  Attending Physician: Bubba Ochoa MD  Primary Care Provider: Tony Sadler MD     Isolation Status: Special Contact    Patient information was obtained from patient, past medical records, and ER records.      Inpatient consult to Infectious Diseases  Consult performed by: Monty Dow Jr., PA  Consult ordered by: Bubba Ochoa MD        Assessment/Plan:     ID  Clostridium difficile infection  74-year-old female with multiple comorbidities transferred from [facility] for higher level of care.  She is on dialysis and had recent antibiotics due to, what she reports as a superficial skin infection.  She reports having diarrhea for 3 weeks but it has worsened recently.  C diff testing on 07/21 was positive and she was started on p.o. vancomycin appropriately.  Her diarrhea has failed to resolve.  She has had ongoing leukocytosis which is not resolving.  This is concerning for complicated C diff. She is afebrile and appears stable.  She is currently on a PPI.    Plan:  Stop p.o. vancomycin and start fidaxomicin  Recommend CT abdomen to assess for any sequelae of C diff or other pathology  Stop PPI  Recommend p.o. cholestyramine for a binder at a distant time from administration of fidaxomicin  Recommend GI consult as patient may need fecal transplant in the future  Discussed with ID staff, we will follow          Thank you for your consult. I will follow-up with patient. Please contact us if you have any additional questions.    DANA Doyle  Infectious Disease  [facility]    Subjective:     Principal Problem: ESRD (end stage renal disease)    HPI: Patient is a 74-year-old female with a past medical history of CKD stage 5, RCC status post right nephrectomy, type 2 diabetes,  obesity, CAD, HF, PD catheter placement on 6/4.  She was transferred from [facility] after she presented there on 06/18/24 as for catheter site was leaking and she was fluid overloaded.  Her hospital course was complicated by PEA arrest and resuscitation.  She was found to have elevated troponins.  It was also complicated by dialysis line infection which she reports was superficial.  [place] [facility] [place].  She has had diarrhea and C diff testing was positive on 07/21/2024 and she was started on oral vancomycin 125 mg p.o. q.6 H. she has continued to have diarrhea so ID was consulted.    Patient chart review shows ongoing leukocytosis with currently at 16.  Patient seen and reports ongoing diarrhea in the rectal tube.  She has pain at her PD cath site but denies any diffuse abdominal pain.  Prior blood and stool cultures are negative.  She does not feel well but denies any fever, chills, sweating.  She clarifies that the diarrhea has been ongoing for three or so weeks but has worsened recently.    Past Medical History:   Diagnosis Date    Anticoagulant long-term use     Arthritis     Breast cancer 2014    invasive lobular carcinoma    Cancer of kidney 11/2020    RIGHT KIDNEY CANCER    CHF (congestive heart failure)     Coronary artery disease dx 2005    Depression     Diabetes mellitus     Diastolic heart failure secondary to hypertension     Gout     Hyperlipemia     Hypertension     Hypertrophy of nasal turbinates     Kidney mass 2020    Right    Levoscoliosis     Lung nodule     left    Multiple thyroid nodules     NICOLE (obstructive sleep apnea)     uses C-PAP    Pulmonary hypertension     Severe sepsis 07/01/2024       Past Surgical History:   Procedure Laterality Date    AORTOGRAPHY N/A 12/04/2020    Procedure: Aortogram;  Surgeon: Paul Pedersen MD;  Location: [facility] CATH;  Service: Cardiology;  Laterality: N/A;    BREAST SURGERY      BRONCHOSCOPY N/A 7/15/2024     "Procedure: BRONCHOSCOPY, FLEXIBLE;  Surgeon: Tanya Beckham MD;  Location: [facility] OR 2ND FLR;  Service: General;  Laterality: N/A;    CARDIAC CATHETERIZATION  12/2020    CHOLECYSTECTOMY      COLONOSCOPY      multi -last 2014     CORONARY ARTERY BYPASS GRAFT      ESOPHAGOGASTRODUODENOSCOPY      2012     HAND SURGERY Right     INSERTION OF CATHETER N/A 6/23/2024    Procedure: Insertion,catheter;  Surgeon: Meli Griffin MD;  Location: [facility] OR;  Service: Vascular;  Laterality: N/A;  ORIGINAL CATHETER WAS REPOSITIONED.  NO NEW CATHETER WAS PLACED    INSERTION OF TUNNELED CENTRAL VENOUS HEMODIALYSIS CATHETER Right 7/11/2024    Procedure: Insertion, Catheter, Central Venous, Hemodialysis;  Surgeon: Hawa Landa MD;  Location: [facility] CATH LAB;  Service: Interventional Nephrology;  Laterality: Right;    INSERTION, CATHETER, DIALYSIS, PERITONEAL N/A 6/4/2024    Procedure: IN Insertion Catheter, Dialysis, Peritoneal - laparoscopic;  Surgeon: Rodriguez Catalan Jr., MD;  Location: [facility];  Service: General;  Laterality: N/A;    LAPAROSCOPIC ROBOT-ASSISTED SURGICAL REMOVAL OF KIDNEY USING DA CHELLE XI Right 03/10/2022    Procedure: XI ROBOTIC NEPHRECTOMY- radical;  Surgeon: Rolando Ramirez MD;  Location: [facility] OR;  Service: Urology;  Laterality: Right;    MASTECTOMY W/ SENTINEL NODE BIOPSY Bilateral 01/21/2015    bilateral "dog ears"    NASAL SINUS SURGERY  2015    Dr Bryant FESS/cauterization turbinate     PARTIAL HYSTERECTOMY  1989    PERCUTANEOUS TRANSLUMINAL BALLOON ANGIOPLASTY OF CORONARY ARTERY  12/04/2020    Procedure: Angioplasty-coronary;  Surgeon: Paul Pedersen MD;  Location: [facility] CATH;  Service: Cardiology;;    PERITONEAL CATHETER REMOVAL N/A 7/15/2024    Procedure: REMOVAL, CATHETER, DIALYSIS, PERITONEAL;  Surgeon: Tanya Beckham MD;  Location: [facility] OR 2ND FLR;  Service: General;  Laterality: N/A;    REMOVAL OF HEMODIALYSIS CATHETER  7/11/2024    Procedure: REMOVAL, CATHETER, HEMODIALYSIS;  " Surgeon: Hawa Landa MD;  Location: [facility] CATH LAB;  Service: Interventional Nephrology;;    RENAL BIOPSY Right     9/20/2021 EJ    TUBAL LIGATION      ULTRASOUND GUIDANCE  12/04/2020    Procedure: Ultrasound Guidance;  Surgeon: Paul Pedersen MD;  Location: [facility] CATH;  Service: Cardiology;;       Review of patient's allergies indicates:   Allergen Reactions    Percocet [oxycodone-acetaminophen] Itching    Januvia [sitagliptin]     Jardiance [empagliflozin]      Leg cramps    Lipitor [atorvastatin] Other (See Comments)     Severe leg pain    Linaclotide Other (See Comments) and Nausea And Vomiting     Does not remember    Lubiprostone Other (See Comments) and Palpitations     Does not remember       Medications:  Medications Prior to Admission   Medication Sig    allopurinoL (ZYLOPRIM) 300 MG tablet Take 1 tablet (300 mg total) by mouth once daily.    amLODIPine (NORVASC) 10 MG tablet Take 10 mg by mouth once daily.    calcitRIOL (ROCALTROL) 0.5 MCG Cap Take 0.5 mcg by mouth once daily.    carvediloL (COREG) 25 MG tablet Take 1 tablet (25 mg total) by mouth 2 (two) times daily.    cloNIDine (CATAPRES) 0.1 MG tablet Take 1 tablet (0.1 mg total) by mouth 3 (three) times daily as needed (PRN SBP > 165 mmHg).    cloNIDine 0.1 mg/24 hr td ptwk (CATAPRES) 0.1 mg/24 hr Place 1 patch onto the skin every 7 days.    cyanocobalamin (VITAMIN B-12) 100 MCG tablet Take 100 mcg by mouth once daily.    ELIQUIS 5 mg Tab TAKE 1 TABLET(5 MG) BY MOUTH TWICE DAILY    evolocumab (REPATHA SURECLICK) 140 mg/mL PnIj Inject 1 mL (140 mg total) into the skin every 14 (fourteen) days.    furosemide (LASIX) 40 MG tablet Take 40 mg by mouth once daily.    blood sugar diagnostic (TRUE METRIX GLUCOSE TEST STRIP) Strp Use 1x daily. Insurance preferred.    blood sugar diagnostic Strp To check BG 1 time daily, to use with insurance preferred meter    lancets Misc To check BG 1 times daily, to use with insurance preferred meter    loratadine  (CLARITIN) 10 mg tablet Take 10 mg by mouth once daily.     Antibiotics (From admission, onward)      Start     Stop Route Frequency Ordered    24  fidaxomicin tablet 200 mg  (C. difficile Infection (CDI) Treatment Order Panel)         24 Oral 2 times daily 24 1359    24  mupirocin 2 % ointment         24 Nasl 2 times daily 24 172          Antifungals (From admission, onward)      Start     Stop Route Frequency Ordered    24  miconazole NITRATE 2 % top powder         -- Top 2 times daily 24          Antivirals (From admission, onward)      None             Immunization History   Administered Date(s) Administered    Influenza 2019       Family History       Problem Relation (Age of Onset)    Asthma Daughter    Birth defects Daughter    Breast cancer Mother, Maternal Aunt    Depression Daughter    Drug abuse Daughter, Daughter    Glaucoma Sister    Hepatitis Brother    Hypertension Father    Learning disabilities Daughter    Mental illness Daughter    Stroke Father          Social History     Socioeconomic History    Marital status: Single    Number of children: 4   Occupational History    Occupation: retired Psych aid    Tobacco Use    Smoking status: Former     Current packs/day: 0.00     Types: Cigarettes     Start date: 2016     Quit date: 2016     Years since quittin.5    Smokeless tobacco: Never    Tobacco comments:     quit    Substance and Sexual Activity    Alcohol use: No    Drug use: No    Sexual activity: Not Currently     Partners: Male     Birth control/protection: None     Social Determinants of Health     Financial Resource Strain: Low Risk  (2024)    Overall Financial Resource Strain (CARDIA)     Difficulty of Paying Living Expenses: Not very hard   Recent Concern: Financial Resource Strain - Medium Risk (2024)    Overall Financial Resource Strain (CARDIA)     Difficulty of Paying  Living Expenses: Somewhat hard   Food Insecurity: No Food Insecurity (7/5/2024)    Hunger Vital Sign     Worried About Running Out of Food in the Last Year: Never true     Ran Out of Food in the Last Year: Never true   Recent Concern: Food Insecurity - Food Insecurity Present (6/20/2024)    Hunger Vital Sign     Worried About Running Out of Food in the Last Year: Sometimes true     Ran Out of Food in the Last Year: Never true   Transportation Needs: No Transportation Needs (7/5/2024)    TRANSPORTATION NEEDS     Transportation : No   Physical Activity: Inactive (7/5/2024)    Exercise Vital Sign     Days of Exercise per Week: 0 days     Minutes of Exercise per Session: 0 min   Stress: Patient Unable To Answer (7/5/2024)    [employer] - Occupational Stress Questionnaire     Feeling of Stress : Patient unable to answer   Housing Stability: Low Risk  (7/5/2024)    Housing Stability Vital Sign     Unable to Pay for Housing in the Last Year: No     Homeless in the Last Year: No     Review of Systems   Constitutional:  Positive for activity change and appetite change. Negative for chills, diaphoresis, fatigue, fever and unexpected weight change.   HENT:  Negative for congestion, ear pain, hearing loss, sore throat and tinnitus.    Eyes:  Negative for pain, redness and visual disturbance.   Respiratory:  Negative for cough, chest tightness, shortness of breath and wheezing.    Cardiovascular:  Negative for chest pain.   Gastrointestinal:  Positive for abdominal pain (At PD cath site). Negative for constipation, diarrhea, nausea and vomiting.   Endocrine: Negative for cold intolerance and heat intolerance.   Genitourinary:  Negative for decreased urine volume, difficulty urinating, dysuria, flank pain, frequency, hematuria and urgency.   Musculoskeletal:  Negative for arthralgias, back pain, myalgias and neck pain.   Skin:  Negative for rash and wound.   Allergic/Immunologic: Negative for  environmental allergies, food allergies and immunocompromised state.   Neurological:  Negative for dizziness, facial asymmetry, weakness, light-headedness, numbness and headaches.   Hematological:  Negative for adenopathy. Does not bruise/bleed easily.   Psychiatric/Behavioral:  Negative for agitation, behavioral problems and confusion.      Objective:     Vital Signs (Most Recent):  Temp: 99.2 °F (37.3 °C) (07/28/24 1215)  Pulse: (!) 112 (07/28/24 1215)  Resp: 18 (07/28/24 1215)  BP: 125/63 (07/28/24 1215)  SpO2: 98 % (07/28/24 1215) Vital Signs (24h Range):  Temp:  [97.8 °F (36.6 °C)-99.5 °F (37.5 °C)] 99.2 °F (37.3 °C)  Pulse:  [] 112  Resp:  [18-20] 18  SpO2:  [96 %-100 %] 98 %  BP: (102-132)/(55-83) 125/63     Weight: 128.5 kg (283 lb 4.7 oz)  Body mass index is 44.37 kg/m².    Estimated Creatinine Clearance: 12.5 mL/min (A) (based on SCr of 5.7 mg/dL (H)).     Physical Exam  Constitutional:       General: She is not in acute distress.     Appearance: She is well-developed. She is obese. She is ill-appearing. She is not toxic-appearing or diaphoretic.       HENT:      Head: Normocephalic and atraumatic.   Eyes:      Pupils: Pupils are equal, round, and reactive to light.   Cardiovascular:      Rate and Rhythm: Normal rate and regular rhythm.      Heart sounds: Normal heart sounds. No murmur heard.     No friction rub. No gallop.   Pulmonary:      Effort: Pulmonary effort is normal. No respiratory distress.      Breath sounds: Normal breath sounds. No wheezing or rales.   Abdominal:      General: Bowel sounds are normal. There is no distension.      Palpations: Abdomen is soft. There is no mass.      Tenderness: There is no abdominal tenderness. There is no guarding.   Musculoskeletal:      Cervical back: Normal range of motion and neck supple.   Skin:     General: Skin is warm and dry.   Neurological:      Mental Status: She is alert and oriented to person, place, and time.   Psychiatric:          Behavior: Behavior normal.          Significant Labs: Blood Culture:   Recent Labs   Lab 06/24/24  2016 06/29/24  1607 07/17/24  1737 07/17/24  1755   LABBLOO No growth after 5 days. No growth after 5 days.  No growth after 5 days. No growth after 5 days. No growth after 5 days.     CBC:   Recent Labs   Lab 07/27/24  0616 07/28/24  0636   WBC 14.69* 16.63*   HGB 7.4* 7.5*   HCT 23.9* 24.7*   PLT 96* 124*     CMP:   Recent Labs   Lab 07/27/24  0616 07/28/24  0636    138   K 3.8 4.2    109   CO2 18* 16*    112*   BUN 36* 43*   CREATININE 5.1* 5.7*   CALCIUM 8.8 9.6   ALBUMIN 2.4* 2.2*   ANIONGAP 11 13     Wound Culture:   Recent Labs   Lab 07/03/24  1530 07/10/24  1630   LABAERO No growth No growth     All pertinent labs within the past 24 hours have been reviewed.    Significant Imaging: I have reviewed all pertinent imaging results/findings within the past 24 hours.  US Lower Extremity Veins Bilateral [[id number]] (Abnormal) Resulted: 07/25/24 1536   Order Status: Completed Updated: 07/25/24 1538   Narrative:     EXAMINATION:  US LOWER EXTREMITY VEINS BILATERAL    CLINICAL HISTORY:  pain/sweling;    TECHNIQUE:  Duplex and color flow Doppler and dynamic compression was performed of the bilateral lower extremity veins was performed.    COMPARISON:  Bilateral lower extremity venous upper ultrasound 07/23/2024    FINDINGS:  Right thigh veins: The common femoral, femoral, popliteal, upper greater saphenous, and deep femoral veins are patent and free of thrombus. The veins are normally compressible and have normal phasic flow and augmentation response.    Right calf veins: The visualized calf veins are patent.    Left thigh veins: Nonocclusive thrombus seen within the mid and distal femoral vein.  The common femoral, greater saphenous and popliteal veins appear patent.    Left calf veins: Only single peroneal and posterior tibial veins are identified whereas previous 2 patent vessels are seen on  previous study 2 days prior concerning for thrombosis of nonvisualized paired vessels.  The paired anterior tibial veins are patent.    Miscellaneous: There is a 1.9 x 2.3 x 1.1 cm nonvascular hypoechoic collection at the right popliteal fossa suggestive of a Baker's cyst.   Impression:       Newly developed left lower extremity DVT from 07/23/2024 study, as above.    No evidence of DVT in the right lower extremity.    Right Washington's cyst.    This report was flagged in Epic as abnormal.      Electronically signed by: Ottoniel Valdez MD  Date: 07/25/2024  Time: 15:36   US Lower Extremity Veins Bilateral [[id number]] Resulted: 07/23/24 1152   Order Status: Completed Updated: 07/23/24 1154   Narrative:     EXAMINATION:  US LOWER EXTREMITY VEINS BILATERAL    CLINICAL HISTORY:  pain/sweling;    TECHNIQUE:  Duplex and color flow Doppler and dynamic compression was performed of the bilateral lower extremity veins was performed.    COMPARISON:  None    FINDINGS:  Right thigh veins: The common femoral, femoral, popliteal, upper greater saphenous, and deep femoral veins are patent and free of thrombus. The veins are normally compressible and have normal phasic flow and augmentation response.    Right calf veins: The visualized calf veins are patent.    Left thigh veins: The common femoral, femoral, popliteal, upper greater saphenous, and deep femoral veins are patent and free of thrombus. The veins are normally compressible and have normal phasic flow and augmentation response.    Left calf veins: The visualized calf veins are patent.    Miscellaneous: Right complicated popliteal fossa cyst measuring 4.9 x 1.6 x 3.4 cm.   Impression:       No evidence of deep venous thrombosis in either lower extremity.      Electronically signed by: Ambrosio Puentes MD  Date: 07/23/2024  Time: 11:52      Imaging History     2024    Date Procedure Name Study Review Link PACS Link Status Accession Number Location   07/27/24 07:18 PM Cardiac  monitoring strips Study Review  Final     07/27/24 04:29 PM Cardiac monitoring strips Study Review  Final     07/26/24 04:12 PM Cardiac monitoring strips Study Review  Final     07/25/24 03:24 PM US Lower Extremity Veins Bilateral Study Review  Images Final [id number] [facility]   07/25/24 05:14 AM Cardiac monitoring strips Study Review  Final     07/24/24 05:49 AM Cardiac monitoring strips Study Review  Final     07/23/24 11:45 AM US Lower Extremity Veins Bilateral Study Review  Images Final [id number] [facility]   07/23/24 02:35 AM Cardiac monitoring strips Study Review  Final     07/22/24 04:54 PM Cardiac monitoring strips Study Review  Final     07/21/24 10:58 PM Cardiac monitoring strips Study Review  Final     07/21/24 09:52 AM Cardiac monitoring strips Study Review  Final     07/21/24 01:48 AM Cardiac monitoring strips Study Review  Final     07/20/24 03:15 PM Cardiac monitoring strips Study Review  Final     07/20/24 10:17 AM Cardiac monitoring strips Study Review  Final     07/20/24 08:37 AM Cardiac monitoring strips Study Review  Final     07/20/24 08:36 AM Cardiac monitoring strips Study Review  Final     07/19/24 04:42 AM Cardiac monitoring strips Study Review  Final     07/17/24 08:06 PM Cardiac monitoring strips Study Review  Final     07/17/24 12:36 PM Cardiac monitoring strips Study Review  Final     07/15/24 05:38 PM X-Ray Chest AP Portable Study Review  Images Final [id number] [facility]   07/15/24 03:11 PM Cardiac monitoring strips Study Review  Final     07/15/24 04:50 AM Cardiac monitoring strips Study Review  Final     07/14/24 04:36 AM Cardiac monitoring strips Study Review  Final     07/12/24 05:38 PM X-Ray Chest 1 View Study Review  Images Final [id number] [facility]   07/12/24 05:56 AM Cardiac monitoring strips Study Review  Final     07/11/24 12:32 PM Cardiac catheterization Study Review  Images Final [id number] [facility]   07/10/24 07:23 PM Cardiac monitoring strips Study Review  Final     07/10/24 08:00 AM Cardiac  monitoring strips Study Review  Final     07/09/24 07:25 PM Cardiac monitoring strips Study Review  Final     07/09/24 11:31 AM CT Head Without Contrast Study Review  Images Final [id number] [facility]   07/09/24 08:37 AM Cardiac monitoring strips Study Review  Final     07/08/24 10:56 PM X-Ray Chest AP Portable Study Review  Images Final [id number] [facility]   07/07/24 09:26 PM Cardiac monitoring strips Study Review  Final     07/07/24 09:25 AM Cardiac monitoring strips Study Review  Final     07/07/24 09:25 AM Cardiac monitoring strips Study Review  Final     07/06/24 11:00 PM Cardiac monitoring strips Study Review  Final     07/04/24 10:53 PM Cardiac monitoring strips Study Review  Final     07/04/24 03:13 PM X-Ray Chest AP Portable Study Review  Images Final [id number] [facility]   07/04/24 01:02 AM Cardiac monitoring strips Study Review  Final     07/03/24 02:22 PM Echo Study Review  Images Final [id number] [facility]   07/03/24 06:08 AM Cardiac monitoring strips Study Review  Final     07/02/24 08:29 PM US Upper Extremity Veins Bilateral Study Review  Images Final [id number] [facility]   06/29/24 01:07 PM US Carotid Bilateral Study Review  Images Final [id number] [facility]   06/29/24 01:07 PM US Lower Extremity Veins Bilateral Study Review  Images Final [id number] [facility]   06/28/24 05:18 AM X-Ray Chest AP Portable Study Review  Images Final [id number] [facility]   06/27/24 04:24 AM X-Ray Chest AP Portable Study Review  Images Final [id number] [facility]

## 2024-07-28 NOTE — SUBJECTIVE & OBJECTIVE
Interval History/Significant Events: NAEON. Worsening leukocytosis and persistent diarrhea, rectal tube placed overnight. Afebrile and denies abd pain apart from PD catheter site.    Review of Systems   Respiratory:  Negative for shortness of breath.    Cardiovascular:  Positive for leg swelling. Negative for chest pain.   Gastrointestinal:  Positive for diarrhea.   Musculoskeletal:  Negative for myalgias.   All other systems reviewed and are negative.    Objective:     Vital Signs (Most Recent):  Temp: 99.3 °F (37.4 °C) (07/28/24 1555)  Pulse: 95 (07/28/24 1555)  Resp: 20 (07/28/24 1555)  BP: (!) 140/62 (07/28/24 1555)  SpO2: 97 % (07/28/24 1555) Vital Signs (24h Range):  Temp:  [97.8 °F (36.6 °C)-99.5 °F (37.5 °C)] 99.3 °F (37.4 °C)  Pulse:  [] 95  Resp:  [18-20] 20  SpO2:  [96 %-100 %] 97 %  BP: (102-140)/(55-83) 140/62   Weight: 128.5 kg (283 lb 4.7 oz)  Body mass index is 44.37 kg/m².      Intake/Output Summary (Last 24 hours) at 7/28/2024 1630  Last data filed at 7/27/2024 2037  Gross per 24 hour   Intake 120 ml   Output --   Net 120 ml            Physical Exam  Vitals and nursing note reviewed.   Constitutional:       General: She is not in acute distress.     Appearance: She is not ill-appearing, toxic-appearing or diaphoretic.   HENT:      Head: Normocephalic and atraumatic.      Right Ear: External ear normal.      Left Ear: External ear normal.      Nose: Nose normal.   Cardiovascular:      Rate and Rhythm: Tachycardia present. Rhythm irregular.      Pulses: Normal pulses.      Heart sounds: Normal heart sounds. No murmur heard.     No friction rub. No gallop.      Comments: No JVD but with 1+ pitting edema bilaterally  Pulmonary:      Effort: Pulmonary effort is normal. No respiratory distress.      Breath sounds: Normal breath sounds. No wheezing, rhonchi or rales.   Abdominal:      General: Abdomen is flat. Bowel sounds are normal. There is no distension.      Palpations: Abdomen is soft.       Tenderness: There is no abdominal tenderness. There is no guarding or rebound.   Musculoskeletal:         General: Swelling present. No tenderness. Normal range of motion.      Right lower leg: Edema present.      Left lower leg: Edema present.   Skin:     General: Skin is warm and dry.      Coloration: Skin is not jaundiced or pale.      Findings: Erythema (LLE) present.   Neurological:      General: No focal deficit present.      Mental Status: She is alert and oriented to person, place, and time. Mental status is at baseline.   Psychiatric:         Mood and Affect: Mood normal.         Behavior: Behavior normal.            Vents:  Vent Mode: Spont (07/16/24 1003)  Set Rate: 15 BPM (07/16/24 0903)  Vt Set: 420 mL (07/16/24 0903)  Pressure Support: 5 cmH20 (07/16/24 1003)  PEEP/CPAP: 5 cmH20 (07/16/24 1003)  Oxygen Concentration (%): 32 (07/25/24 1755)  Peak Airway Pressure: 10 cmH20 (07/16/24 1003)  Plateau Pressure: 0 cmH20 (07/16/24 1003)  Total Ve: 5.45 L/m (07/16/24 1003)  Negative Inspiratory Force (cm H2O): 0 (07/16/24 1003)  Lines/Drains/Airways       Central Venous Catheter Line  Duration                  Hemodialysis Catheter 07/11/24 1219 left subclavian 17 days    Trialysis (Dialysis) Catheter 07/20/24 1120 left femoral 8 days                  Significant Labs:    CBC/Anemia Profile:  Recent Labs   Lab 07/27/24  0616 07/28/24  0636   WBC 14.69* 16.63*   HGB 7.4* 7.5*   HCT 23.9* 24.7*   PLT 96* 124*   MCV 87 86   RDW 22.6* 22.4*        Chemistries:  Recent Labs   Lab 07/27/24  0616 07/28/24  0636    138   K 3.8 4.2    109   CO2 18* 16*   BUN 36* 43*   CREATININE 5.1* 5.7*   CALCIUM 8.8 9.6   ALBUMIN 2.4* 2.2*   MG 2.0 2.0   PHOS 4.0  4.0 4.4  4.4       All pertinent labs within the past 24 hours have been reviewed.    Significant Imaging:  I have reviewed all pertinent imaging results/findings within the past 24 hours.

## 2024-07-29 NOTE — PROGRESS NOTES
[facility] ([place] [address])  Nephrology  Progress Note    Patient Name: Juhi Taylor  MRN: [id number]  Admission Date: 7/3/2024  Hospital Length of Stay: 26 days  Attending Provider: Bayron Roy*   Primary Care Physician: Tony Sadler MD  Principal Problem:ESRD (end stage renal disease)    Subjective:     HPI: 74-year-old female with prior history of CKD stage 5 with recent PD catheter placement on 6/4, RCC s/p right nephrectomy, T2DM, obesity, CAD s/p PCI to Lcx and LAD in 11/2020 and HFmREF who is admitted as a transfer from [facility] for higher level of care and further cardiac evaluation. Nephrology consulted for hemodialysis.     She initially presented on 6/18/24 to [facility] for hypervolemia in the setting of CKD 4-5 for which she had undergone elective PD catheter placement complicated by catheter site leaking. She had a complex hospital course, was initially placed on furosemide gtt without significant improvement and later underwent placement of tunneled line for HD with volume removal. During her second dialysis session she went into PEA arrest for which she was resuscitated, intubated and transferred to ICU. She was noted to have elevated troponin and subsequent TTE found reduced EF from 40-45% to 20-25% for which coronary angiography was recommended. Her hospital course was also complicated by dialysis line infection, significant bleeding around the catheter site, bradycardia and subsequent atrial fibrillation with RVR. Given the catheter site bleeding, anticoagulation was temporarily held. She has reported allergy to amiodarone, was initially placed on diltiazem for atrial fibrillation despite reduced EF. She discussed GOC at outside hospital where it appears that she lacked capacity per note and decided to pursue higher level of care at [facility]. At that time, gen surgery was planning to place a left tunneled catheter due to concerns of the right side not being  suitable    Upon admit, patient appeared drowsy but conversant. Daughter reports that she typically gets lethargic 2/2 to the volume overload due to not receiving dialysis. Patient reported b/l left lower extremity pain. Daughter concerned about her not having dialysis in 4 days and wants a session today.     Interval History: NAEON, getting iHD today with 0 UF, still having diarrheal episodes.    Review of patient's allergies indicates:   Allergen Reactions    Percocet [oxycodone-acetaminophen] Itching    Januvia [sitagliptin]     Jardiance [empagliflozin]      Leg cramps    Lipitor [atorvastatin] Other (See Comments)     Severe leg pain    Linaclotide Other (See Comments) and Nausea And Vomiting     Does not remember    Lubiprostone Other (See Comments) and Palpitations     Does not remember     Current Facility-Administered Medications   Medication Frequency    0.9%  NaCl infusion (for blood administration) Q24H PRN    acetaminophen tablet 650 mg Q6H PRN    cholestyramine 4 gram packet 4 g BID    dextromethorphan-guaiFENesin  mg/5 ml liquid 5 mL Q4H PRN    dextrose 10% bolus 125 mL 125 mL PRN    dextrose 10% bolus 250 mL 250 mL PRN    diphenhydrAMINE capsule 25 mg Q6H PRN    famotidine tablet 20 mg Daily    ferric gluconate (FERRLECIT) 125 mg in 0.9% NaCl 100 mL IVPB Daily    [START ON 7/30/2024] ferrous sulfate tablet 1 each Daily    fidaxomicin tablet 200 mg BID    glucagon (human recombinant) injection 1 mg PRN    glucose chewable tablet 16 g PRN    glucose chewable tablet 24 g PRN    heparin 25,000 units in dextrose 5% (100 units/ml) IV bolus from bag LOW INTENSITY nomogram - [facility] PRN    heparin 25,000 units in dextrose 5% (100 units/ml) IV bolus from bag LOW INTENSITY nomogram - [facility] PRN    heparin 25,000 units in dextrose 5% 250 mL (100 units/mL) infusion LOW INTENSITY nomogram - [facility] Continuous    insulin aspart U-100 pen 0-10 Units QID (AC + HS) PRN    LIDOcaine 5 % patch 2 patch Q24H    metoprolol  injection 5 mg Q6H PRN    metoprolol tartrate (LOPRESSOR) tablet 50 mg BID    miconazole NITRATE 2 % top powder BID    midodrine tablet 10 mg Daily PRN    naloxone 0.4 mg/mL injection 0.02 mg PRN    ondansetron injection 4 mg Q6H PRN    sacubitriL-valsartan 24-26 mg per tablet 1 tablet BID    simethicone chewable tablet 80 mg TID PRN    sodium chloride 0.9% flush 10 mL PRN    traMADoL tablet 50 mg Q8H PRN       Objective:     Vital Signs (Most Recent):  Temp: 99 °F (37.2 °C) (07/29/24 1133)  Pulse: (!) 50 (07/29/24 1133)  Resp: 20 (07/29/24 1133)  BP: 100/60 (07/29/24 1133)  SpO2: 96 % (07/29/24 1133) Vital Signs (24h Range):  Temp:  [98.3 °F (36.8 °C)-99.3 °F (37.4 °C)] 99 °F (37.2 °C)  Pulse:  [] 50  Resp:  [18-20] 20  SpO2:  [93 %-98 %] 96 %  BP: ()/(54-63) 100/60     Weight: 128.5 kg (283 lb 4.7 oz) (07/24/24 1247)  Body mass index is 44.37 kg/m².  Body surface area is 2.46 meters squared.    I/O last 3 completed shifts:  In: 326.7 [P.O.:120; IV Piggyback:206.7]  Out: -      Physical Exam  Constitutional:       Appearance: Normal appearance. She is obese.   Cardiovascular:      Rate and Rhythm: Normal rate.      Pulses: Normal pulses.   Pulmonary:      Effort: Pulmonary effort is normal. No respiratory distress.      Breath sounds: Rhonchi present. No wheezing.   Musculoskeletal:      Right lower leg: No edema.      Left lower leg: No edema.   Skin:     General: Skin is warm.   Neurological:      Mental Status: She is alert and oriented to person, place, and time.          Significant Labs:  BMP:   Recent Labs   Lab 07/29/24  0419   *      K 4.0      CO2 15*   BUN 47*   CREATININE 6.3*   CALCIUM 8.9   MG 2.1     CBC:   Recent Labs   Lab 07/29/24  0419   WBC 17.92*   RBC 2.73*   HGB 7.3*   HCT 23.3*   *   MCV 85   MCH 26.7*   MCHC 31.3*        Significant Imaging:  Labs: Reviewed  ECG: Reviewed  X-Ray: Reviewed  US: Reviewed  CT: Reviewed  Assessment/Plan:     Renal/  *  ESRD (end stage renal disease)  ESRD - was briefly on PD then HD, having issues w tunnel cath and now w trialysis in place in [facility]. S/p R nephrectomy RCC  C diff positive - on oral vanc  HTN  DM  Afib w rvr  CHF w ischemic cardiomyopathy  S/p cardiac arrest     Plan/Recommendations  -Planing for iHD today, UF goal 0.  -Dr Landa is planing for TDC placement on this Thursday, 08/01.  -Will need blood cultures as WBCs are rising, if cultures come positive then will remove the Left sided TDC. The femoral trialysis needs to be changed as almost 10 days since it was placed.  -Renally adjust medications  -Pre and Post-HD weights   -Continue to monitor intake and output       Anemia in ESRD;  .Hemoglobin goal in ESRD is 10-12g/dl  Can receive iron infusion once infection resolves.  .  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  Lab Results   Component Value Date    .7 (H) 05/08/2024    CALCIUM 8.9 07/29/2024    CAION 1.02 (L) 07/20/2024    PHOS 4.7 (H) 07/29/2024    PHOS 4.7 (H) 07/29/2024            Thank you for your consult. I will follow-up with patient. Please contact us if you have any additional questions.    Edith Lazo MD  Nephrology  [facility] ([place])

## 2024-07-29 NOTE — PLAN OF CARE
[facility] ([place])  Discharge Reassessment    Primary Care Provider: Tony Sadler MD    Expected Discharge Date: 7/29/2024    Reassessment (most recent)       Discharge Reassessment - 07/29/24 1422          Discharge Reassessment    Assessment Type Discharge Planning Reassessment     Did the patient's condition or plan change since previous assessment? No     Discharge Plan discussed with: Patient     Communicated HARINI with patient/caregiver Date not available/Unable to determine     Discharge Plan A Skilled Nursing Facility     Discharge Plan B Rehab     DME Needed Upon Discharge  none     Transition of Care Barriers None     Why the patient remains in the hospital Requires continued medical care        Post-Acute Status    Post-Acute Authorization Dialysis;Placement     Post-Acute Placement Status Referrals Sent     Coverage Medicare part A & B     Discharge Delays Procedure Scheduling (IR, OR, Labs, Echo, Cath, Echo, EEG)                       Discharge Plan A and Plan B have been determined by review of patient's clinical status, future medical and therapeutic needs, and coverage/benefits for post-acute care in coordination with multidisciplinary team members.    Verena Phillips MSW, CSW

## 2024-07-29 NOTE — PLAN OF CARE
Met with family member/pt to review discharge recommendation of SNF and is agreeable to plan    Patient/family provided list of facilities in-network with patient's payor plan. Providers that are owned, operated, or affiliated with [facility] are included on the list.     Notified that referral sent to below listed facilities from in-network list based on proximity to home/family support:   [facility]  2. [facility]  3.  4.  5. (can send more than 5)    Patient/family instructed to identify preference.    Preferred Facility: (if more than 1, listed in order of descending preference)  [facility]    If an additional preferred facility not listed above is identified, additional referral to be sent. If above facilities unable to accept, will send additional referrals to in-network providers.       Verena Phillips MSW, CSW

## 2024-07-29 NOTE — HPI
Juhi Taylor is a 74-year-old female with a past medical history of CKD stage 5 with recent PD catheter placement on 6/4, RCC s/p right nephrectomy, T2DM, obesity, CAD s/p PCI to Lcx and LAD in 11/2020 and HFmREF who is admitted as a transfer from [facility] for higher level of care and further cardiac evaluation. She has had a complaicated hospital course that has lasted for greater than 1 month- during that time there were multiple complications that included line infection, PEA arrest, and development of afib with RVR. GI has now been consulted for persistent C diff infection.    Regarding her diarrhea, she tested positive for C Diff on 7/22 and was started on vancomycin. Per patient and chart review, this is her first time with this infection. The diarrhea persisted and her WBC continued to rise, so ID and GI were consulted. ON 7/28, ID switched the patient from vancomycin to fidaxomicin and initiated cholestyramine therapy. She reports in improvement in her stool output since these changes were made. She currently has a rectal tube in and is unable to accurately quantify the number of stools she has had. She reports some LLQ tenderness (this is where her PD catheter was previously and she is unable to specify if pain is secondary her diarrheal illness or the PD cath). She denies any fever chills, N/V.    She underwent CT A/P on 7/28 which showed colitis of her distal and transverse colon, a mass in the transverse colon could not be ruled out, and mild ascites.

## 2024-07-29 NOTE — ASSESSMENT & PLAN NOTE
ESRD - was briefly on PD then HD, having issues w tunnel cath and now w trialysis in place in [facility]. S/p R nephrectomy RCC  C diff positive - on oral vanc  HTN  DM  Afib w rvr  CHF w ischemic cardiomyopathy  S/p cardiac arrest     Plan/Recommendations  -Planing for iHD today, UF goal 0.  -Dr Landa is planing for TDC placement on this Thursday, 08/01.  -Will need blood cultures as WBCs are rising, if cultures come positive then will remove the Left sided TDC. The femoral trialysis needs to be changed as almost 10 days since it was placed.  -Renally adjust medications  -Pre and Post-HD weights   -Continue to monitor intake and output       Anemia in ESRD;  .Hemoglobin goal in ESRD is 10-12g/dl  Can receive iron infusion once infection resolves.  .  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  Lab Results   Component Value Date    .7 (H) 05/08/2024    CALCIUM 8.9 07/29/2024    CAION 1.02 (L) 07/20/2024    PHOS 4.7 (H) 07/29/2024    PHOS 4.7 (H) 07/29/2024

## 2024-07-29 NOTE — NURSING
HD complete. No fluid removed per order. VSS. APTT drawn and sent to lab. Report given to Yissel CHAUDHARY. Left fem CVC NS locked per order. Pt awaiting transport to room via bed. Pt request her dentures be taken out. Dentures placed in gray oral care tub and are sitting on the patients lap. Herparin gtt runniing per MAR.

## 2024-07-29 NOTE — SUBJECTIVE & OBJECTIVE
Interval History: Afebrile. WBC increased. Now on fidaxomycin. GI consulted.    Review of Systems   Constitutional:  Negative for chills, diaphoresis and fever.   Respiratory:  Negative for cough, chest tightness and shortness of breath.    Cardiovascular:  Negative for chest pain and leg swelling.   Gastrointestinal:  Positive for abdominal distention, abdominal pain and diarrhea. Negative for constipation, nausea and vomiting.   Musculoskeletal:  Negative for back pain.   Skin:  Negative for color change and rash.   Neurological:  Negative for headaches.   Psychiatric/Behavioral:  Negative for agitation and confusion. The patient is not nervous/anxious.      Objective:     Vital Signs (Most Recent):  Temp: 99 °F (37.2 °C) (07/29/24 1133)  Pulse: 99 (07/29/24 1615)  Resp: 18 (07/29/24 1338)  BP: (!) 89/55 (07/29/24 1615)  SpO2: 96 % (07/29/24 1133) Vital Signs (24h Range):  Temp:  [98.3 °F (36.8 °C)-99 °F (37.2 °C)] 99 °F (37.2 °C)  Pulse:  [] 99  Resp:  [18-20] 18  SpO2:  [93 %-97 %] 96 %  BP: ()/(50-65) 89/55     Weight: 128.5 kg (283 lb 4.7 oz)  Body mass index is 44.37 kg/m².    Estimated Creatinine Clearance: 11.3 mL/min (A) (based on SCr of 6.3 mg/dL (H)).     Physical Exam  Constitutional:       General: She is not in acute distress.     Appearance: Normal appearance. She is well-developed. She is not ill-appearing or diaphoretic.   HENT:      Head: Normocephalic and atraumatic.      Right Ear: External ear normal.      Left Ear: External ear normal.      Nose: Nose normal.   Eyes:      General: No scleral icterus.        Right eye: No discharge.         Left eye: No discharge.      Extraocular Movements: Extraocular movements intact.      Conjunctiva/sclera: Conjunctivae normal.   Cardiovascular:      Rate and Rhythm: Tachycardia present.   Pulmonary:      Effort: Pulmonary effort is normal. No respiratory distress.      Breath sounds: No stridor.   Abdominal:      Palpations: Abdomen is soft.       Tenderness: There is abdominal tenderness.      Comments: Former PD site c/d/i   Musculoskeletal:         General: Normal range of motion.   Skin:     General: Skin is dry.      Findings: No erythema or rash.   Neurological:      General: No focal deficit present.      Mental Status: She is alert and oriented to person, place, and time. Mental status is at baseline.      Cranial Nerves: No cranial nerve deficit.   Psychiatric:         Mood and Affect: Mood normal.         Behavior: Behavior normal.         Thought Content: Thought content normal.         Judgment: Judgment normal.          Significant Labs: Blood Culture:   Recent Labs   Lab 06/24/24  2016 06/29/24  1607 07/17/24 1737 07/17/24 1755   LABBLOO No growth after 5 days. No growth after 5 days.  No growth after 5 days. No growth after 5 days. No growth after 5 days.     CBC:   Recent Labs   Lab 07/28/24 0636 07/29/24 0419   WBC 16.63* 17.92*   HGB 7.5* 7.3*   HCT 24.7* 23.3*   * 140*     CMP:   Recent Labs   Lab 07/28/24 0636 07/29/24 0419    136   K 4.2 4.0    109   CO2 16* 15*   * 141*   BUN 43* 47*   CREATININE 5.7* 6.3*   CALCIUM 9.6 8.9   ALBUMIN 2.2* 2.0*   ANIONGAP 13 12     Microbiology Results (last 7 days)       Procedure Component Value Units Date/Time    Blood culture [[id number]] Collected: 07/29/24 1345    Order Status: Sent Specimen: Blood Updated: 07/29/24 1417    Blood culture [[id number]] Collected: 07/29/24 1346    Order Status: Sent Specimen: Blood Updated: 07/29/24 1417    Stool culture [[id number]] Collected: 07/20/24 1953    Order Status: Completed Specimen: Stool Updated: 07/23/24 0935     Stool Culture No Salmonella,Shigella,Vibrio,Campylobacter,Yersinia isolated.    Blood culture [[id number]] Collected: 07/17/24 1755    Order Status: Completed Specimen: Blood from Peripheral, Hand, Right Updated: 07/22/24 2012     Blood Culture, Routine No growth after 5 days.    Blood culture [[id number]]  Collected: 07/17/24 1737    Order Status: Completed Specimen: Blood from Peripheral, Lower Arm, Right Updated: 07/22/24 2012     Blood Culture, Routine No growth after 5 days.          Recent Lab Results         07/29/24  1131   07/29/24  0828   07/29/24  0419   07/28/24 2020        Albumin     2.0         Anion Gap     12         Aniso     Moderate         PTT     73.6  Comment: Refer to local heparin nomogram for intensity/dose specific   therapeutic   range.           Baso #     0.14         Basophilic Stippling     Occasional         Basophil %     0.8         BUN     47         Gurpreet/Echinocytes     Occasional         Calcium     8.9         Chloride     109         CO2     15         Creatinine     6.3         Differential Method     Automated         eGFR     6.6         Eos #     0.1         Eos %     0.6         Glucose     141         Gran # (ANC)     15.1         Gran %     84.2         Hematocrit     23.3         Hemoglobin     7.3         Immature Grans (Abs)     0.26  Comment: Mild elevation in immature granulocytes is non specific and   can be seen in a variety of conditions including stress response,   acute inflammation, trauma and pregnancy. Correlation with other   laboratory and clinical findings is essential.           Immature Granulocytes     1.5         Lymph #     1.4         Lymph %     7.7         Magnesium      2.1         MCH     26.7         MCHC     31.3         MCV     85         Mono #     0.9         Mono %     5.2         MPV     10.7         nRBC     1         Ovalocytes     Occasional         Phosphorus Level     4.7              4.7         Platelet Estimate     Appears normal         Platelet Count     140         POCT Glucose 169   153     133       Poikilocytosis     Slight         Potassium     4.0         RBC     2.73         RDW     22.5         Sodium     136         Target Cells     Occasional         Toxic Granulation     Present         WBC     17.92                  Significant Imaging:       CT Abdomen Pelvis Without Contrast [[id number]] (Abnormal) Resulted: 07/28/24 2121   Order Status: Completed Updated: 07/28/24 2123   Narrative:     EXAMINATION:  CT ABDOMEN PELVIS WITHOUT CONTRAST    CLINICAL HISTORY:  c diff, eval for toxic megacolon;    TECHNIQUE:  Low dose axial images, sagittal and coronal reformations were obtained from the lung bases to the pubic symphysis, Oral contrast was not administered.    COMPARISON:  08/05/2021    FINDINGS:  Heart: The heart is enlarged.  Prominent coronary atherosclerosis.    Lung Bases: Small basilar pleural effusions with mild associated atelectasis or consolidation.    Liver: The liver is enlarged.  No focal abnormality.  Limited characterization on this noncontrast study.    Gallbladder: Status post cholecystectomy.    Bile Ducts: No evidence of dilated ducts.    Pancreas: No mass or peripancreatic fat stranding.    Spleen: Unremarkable.    Adrenals: Unremarkable.    Kidneys/ Ureters: Status post right nephrectomy.  Stable probable tiny subcentimeter cyst with minimal wall calcification on the left.  Nonobstructing nephrolithiasis of the left kidney in the upper pole.  No hydronephrosis or hydroureter on the left.    Bladder: No evidence of wall thickening.    Reproductive organs: Status post hysterectomy.    GI Tract/Mesentery: There is prominent wall thickening noted to the proximal transverse colon on the right.  Wall thickening is less prominent in the distal transverse colon and descending colon.  Findings could be associated with colitis.  An underlying mass in the proximal transverse colon could not be excluded.  Colonoscopy follow-up post treatment recommended.  The colon is not significantly dilated.    There is a rectal tube and distal balloon present.    Peritoneal Space: There is mild ascites in the abdomen and pelvis.  No free air.    Retroperitoneum: No significant adenopathy.    Abdominal wall: There is moderate  edema in the subcutaneous fat in the flank region bilaterally.  Minimal fluid in the subcutaneous fat in the lower fat pannus on the left may be associated with injections    Vasculature: No significant atherosclerosis or aneurysm.    Bones: No acute fracture.    Degenerative disc changes in the lumbar spine.   Impression:       1. Prominent wall thickening of the transverse colon and descending colon most prominent at the proximal transverse colon.  The findings could represent colitis.  An underlying mass in the proximal transverse colon cannot be excluded.  See above comments.  Follow-up is recommended.  2. Hepatomegaly with mild ascites in the abdomen and pelvis.  3. Cardiomegaly with prominent coronary atherosclerosis.  4. Small bibasilar pleural effusions with mild associated atelectasis or consolidation.  5. Status post right nephrectomy.  Probable small subcentimeter minimally complex left renal cyst with minimal wall calcification.  No significant change.  6. See above comments also.  7. This report was flagged in Epic as abnormal.      Electronically signed by: Russell Felix  Date: 07/28/2024  Time: 21:21

## 2024-07-29 NOTE — SUBJECTIVE & OBJECTIVE
Past Medical History:   Diagnosis Date    Anticoagulant long-term use     Arthritis     Breast cancer 2014    invasive lobular carcinoma    Cancer of kidney 11/2020    RIGHT KIDNEY CANCER    CHF (congestive heart failure)     Coronary artery disease dx 2005    Depression     Diabetes mellitus     Diastolic heart failure secondary to hypertension     Gout     Hyperlipemia     Hypertension     Hypertrophy of nasal turbinates     Kidney mass 2020    Right    Levoscoliosis     Lung nodule     left    Multiple thyroid nodules     NICOLE (obstructive sleep apnea)     uses C-PAP    Pulmonary hypertension     Severe sepsis 07/01/2024       Past Surgical History:   Procedure Laterality Date    AORTOGRAPHY N/A 12/04/2020    Procedure: Aortogram;  Surgeon: Paul Pedersen MD;  Location: [facility] CATH;  Service: Cardiology;  Laterality: N/A;    BREAST SURGERY      BRONCHOSCOPY N/A 7/15/2024    Procedure: BRONCHOSCOPY, FLEXIBLE;  Surgeon: Tanya Beckham MD;  Location: [address];  Service: General;  Laterality: N/A;    CARDIAC CATHETERIZATION  12/2020    CHOLECYSTECTOMY      COLONOSCOPY      multi -last 2014     CORONARY ARTERY BYPASS GRAFT      ESOPHAGOGASTRODUODENOSCOPY      2012     HAND SURGERY Right     INSERTION OF CATHETER N/A 6/23/2024    Procedure: Insertion,catheter;  Surgeon: Meli Griffin MD;  Location: [facility] OR;  Service: Vascular;  Laterality: N/A;  ORIGINAL CATHETER WAS REPOSITIONED.  NO NEW CATHETER WAS PLACED    INSERTION OF TUNNELED CENTRAL VENOUS HEMODIALYSIS CATHETER Right 7/11/2024    Procedure: Insertion, Catheter, Central Venous, Hemodialysis;  Surgeon: Hawa Landa MD;  Location: [facility] CATH LAB;  Service: Interventional Nephrology;  Laterality: Right;    INSERTION, CATHETER, DIALYSIS, PERITONEAL N/A 6/4/2024    Procedure: IN Insertion Catheter, Dialysis, Peritoneal - laparoscopic;  Surgeon: Rodriguez Catalan Jr., MD;  Location: [facility];  Service: General;  Laterality: N/A;    LAPAROSCOPIC  "ROBOT-ASSISTED SURGICAL REMOVAL OF KIDNEY USING DA CHELLE XI Right 03/10/2022    Procedure: XI ROBOTIC NEPHRECTOMY- radical;  Surgeon: Rolando Ramirez MD;  Location: [facility] OR;  Service: Urology;  Laterality: Right;    MASTECTOMY W/ SENTINEL NODE BIOPSY Bilateral 01/21/2015    bilateral "dog ears"    NASAL SINUS SURGERY  2015    Dr Bryant FESS/cauterization turbinate     PARTIAL HYSTERECTOMY  1989    PERCUTANEOUS TRANSLUMINAL BALLOON ANGIOPLASTY OF CORONARY ARTERY  12/04/2020    Procedure: Angioplasty-coronary;  Surgeon: Paul Pedersen MD;  Location: [facility] CATH;  Service: Cardiology;;    PERITONEAL CATHETER REMOVAL N/A 7/15/2024    Procedure: REMOVAL, CATHETER, DIALYSIS, PERITONEAL;  Surgeon: Tanya Bechkam MD;  Location: [facility] OR [address] [place] [address];  Service: General;  Laterality: N/A;    REMOVAL OF HEMODIALYSIS CATHETER  7/11/2024    Procedure: REMOVAL, CATHETER, HEMODIALYSIS;  Surgeon: Hawa Landa MD;  Location: [facility] CATH LAB;  Service: Interventional Nephrology;;    RENAL BIOPSY Right     9/20/2021 EJ    TUBAL LIGATION      ULTRASOUND GUIDANCE  12/04/2020    Procedure: Ultrasound Guidance;  Surgeon: Paul Pedersen MD;  Location: [facility] CATH;  Service: Cardiology;;       Review of patient's allergies indicates:   Allergen Reactions    Percocet [oxycodone-acetaminophen] Itching    Januvia [sitagliptin]     Jardiance [empagliflozin]      Leg cramps    Lipitor [atorvastatin] Other (See Comments)     Severe leg pain    Linaclotide Other (See Comments) and Nausea And Vomiting     Does not remember    Lubiprostone Other (See Comments) and Palpitations     Does not remember     Family History       Problem Relation (Age of Onset)    Asthma Daughter    Birth defects Daughter    Breast cancer Mother, Maternal Aunt    Depression Daughter    Drug abuse Daughter, Daughter    Glaucoma Sister    Hepatitis Brother    Hypertension Father    Learning disabilities Daughter    Mental illness Daughter    Stroke Father          Tobacco " Use    Smoking status: Former     Current packs/day: 0.00     Types: Cigarettes     Start date: 2016     Quit date: 2016     Years since quittin.5    Smokeless tobacco: Never    Tobacco comments:     quit    Substance and Sexual Activity    Alcohol use: No    Drug use: No    Sexual activity: Not Currently     Partners: Male     Birth control/protection: None     Review of Systems   Constitutional:  Negative for chills and fever.   Respiratory:  Negative for shortness of breath.    Cardiovascular:  Negative for chest pain.   Gastrointestinal:  Positive for abdominal distention, abdominal pain and diarrhea. Negative for blood in stool, nausea and vomiting.   Endocrine: Negative for heat intolerance.   Genitourinary:         Anuria      Skin:  Negative for pallor.   Neurological:  Negative for dizziness and headaches.     Objective:     Vital Signs (Most Recent):  Temp: 98.4 °F (36.9 °C) (24)  Pulse: 107 (24)  Resp: 18 (24)  BP: (!) 107/56 (24)  SpO2: 97 % (24) Vital Signs (24h Range):  Temp:  [98.4 °F (36.9 °C)-99.5 °F (37.5 °C)] 98.4 °F (36.9 °C)  Pulse:  [] 107  Resp:  [18-20] 18  SpO2:  [93 %-100 %] 97 %  BP: (107-140)/(55-63) 107/56     Weight: 128.5 kg (283 lb 4.7 oz) (24 1247)  Body mass index is 44.37 kg/m².      Intake/Output Summary (Last 24 hours) at 2024 0815  Last data filed at 2024 1800  Gross per 24 hour   Intake 206.67 ml   Output --   Net 206.67 ml       Lines/Drains/Airways       Central Venous Catheter Line  Duration                  Hemodialysis Catheter 24 1219 left subclavian 17 days    Trialysis (Dialysis) Catheter 24 1120 left femoral 8 days              Peripheral Intravenous Line  Duration                  Peripheral IV - Single Lumen 24 1931 22 G Left Antecubital <1 day                     Physical Exam  Constitutional:       General: She is not in acute distress.      Appearance: She is obese.   HENT:      Head: Normocephalic and atraumatic.      Mouth/Throat:      Mouth: Mucous membranes are dry.      Pharynx: Oropharynx is clear.   Eyes:      General: No scleral icterus.  Cardiovascular:      Rate and Rhythm: Tachycardia present.      Heart sounds: Normal heart sounds.   Pulmonary:      Effort: Pulmonary effort is normal. No respiratory distress.   Abdominal:      General: There is no distension.      Palpations: Abdomen is soft.      Tenderness: There is abdominal tenderness (LLQ at former PD site). There is no guarding or rebound.   Musculoskeletal:         General: No tenderness.   Skin:     General: Skin is warm and dry.   Neurological:      General: No focal deficit present.      Mental Status: She is alert and oriented to person, place, and time.   Psychiatric:         Mood and Affect: Mood normal.          Significant Labs:  BMP:   Recent Labs   Lab 07/29/24  0419   *      K 4.0      CO2 15*   BUN 47*   CREATININE 6.3*   CALCIUM 8.9   MG 2.1     CMP:   Recent Labs   Lab 07/29/24  0419   *   CALCIUM 8.9   ALBUMIN 2.0*      K 4.0   CO2 15*      BUN 47*   CREATININE 6.3*       Significant Imaging:  Imaging results within the past 24 hours have been reviewed.

## 2024-07-29 NOTE — PT/OT/SLP PROGRESS
Occupational Therapy   Treatment    Name: Juhi Taylor  MRN: [id number]  Admitting Diagnosis:  ESRD (end stage renal disease)  14 Days Post-Op    Recommendations:     Discharge Recommendations: Moderate Intensity Therapy  Discharge Equipment Recommendations:  bedside commode, wheelchair, walker, rolling, bath bench, lift device, hospital bed  Barriers to discharge:  Other (Comment) (increased assistance needed)    Assessment:     Juhi Taylor is a 74 y.o. female with a medical diagnosis of ESRD (end stage renal disease).  Pt alert and cooperative and required increased motivation to participate in session. Session was limited by pain in bilateral LE and only able to sit EOB w/ total A throughout session. Pt required SBA-Kathleen to sit EOB and was unable tolerate standing or ADL task. She was soiled and required Total A x3 to assist. Pt significantly below PLOF and would benefit from continued skilled therapy to improve the following performance deficits affecting function: weakness, impaired endurance, impaired self care skills, impaired functional mobility, gait instability, decreased lower extremity function, decreased upper extremity function, decreased coordination, edema, pain, impaired cardiopulmonary response to activity, impaired skin, decreased safety awareness, impaired balance.     Rehab Prognosis:  Fair; patient would benefit from acute skilled OT services to address these deficits and reach maximum level of function.       Plan:     Patient to be seen 4 x/week to address the above listed problems via self-care/home management, therapeutic activities, therapeutic exercises, neuromuscular re-education  Plan of Care Expires: 08/16/24  Plan of Care Reviewed with: patient, daughter    Subjective     Chief Complaint: bilateral leg pain w/ movement   Patient/Family Comments/goals: patient agreed to therapy  Pain/Comfort:  Pain Rating 1:  (pt did not rate)  Location - Side 1: Bilateral  Location -  Orientation 1: generalized  Location 1: leg (R&L LE)  Pain Addressed 1: Reposition, Distraction    Objective:     Communicated with: NSG prior to session.  Patient found supine with peripheral IV, bowel management system, telemetry, oxygen upon OT entry to room.    General Precautions: Standard, fall, special contact    Orthopedic Precautions:N/A  Braces: N/A  Respiratory Status: Nasal cannula, flow 2 L/min     Occupational Performance:     Bed Mobility:    Patient completed Rolling/Turning to Left with  total assistance and 2 persons  Patient completed Rolling/Turning to Right with total assistance and 2 persons  Patient completed Scooting/Bridging with total assistance and 2 persons  Patient completed Supine to Sit with total assistance and 2 persons  Patient completed Sit to Supine with total assistance and 2 persons     Functional Mobility/Transfers:  Pt unable to perform     Activities of Daily Living:  Grooming: seated EOB pt back was cleansed w/ totalA    Toileting: pt supine in bed performed warren care w/ totalA x2         [facility] 6 Click ADL: 11    Treatment & Education:  Role of OT and POC   EOB sitting balance   Importance of EOB activity   ADL retraining   Bed Mobility         Patient left supine with all lines intact, call button in reach, nurse and daughter present, and all needs met    GOALS:   Multidisciplinary Problems       Occupational Therapy Goals          Problem: Occupational Therapy    Goal Priority Disciplines Outcome Interventions   Occupational Therapy Goal     OT, PT/OT Progressing    Description: Goals to be met by: 08/04/24     Patient will increase functional independence with ADLs by performing:    UE Dressing with Modified Lares.  LE Dressing with Minimal Assistance.  Grooming while standing at sink with Stand-by Assistance.  Toileting from bedside commode with Minimal Assistance for hygiene and clothing management.   Toilet transfer to bedside commode with  Supervision.    DME:  Tub transfer bench is required for pt to return to t/s use with shower chair at present unsuitable with need for mobiltiy greater than pt can safely complete at present.     Patient has a mobility limitation that significantly impairs their ability to participate in one or more mobility related activities of daily living, including toileting. This deficit can be resolved by using a bedside commode. Patient demonstrates mobility limitations that will cause them to be confined to one room at home without bathroom access for up to 30 days. Using a bedside commode will greatly improve the patient's ability to participate in MRADLs.     Ambulation needs TBD on progress.                              Time Tracking:     OT Date of Treatment: 07/29/24  OT Start Time: 1050  OT Stop Time: 1128  OT Total Time (min): 38 min    Billable Minutes:Self Care/Home Management 14  Therapeutic Activity 12  Neuromuscular Re-education 12    OT/DAYSI: OT     Number of DAYSI visits since last OT visit: 1 7/29/2024

## 2024-07-29 NOTE — PROGRESS NOTES
[facility] - Stepdown Flex ([place])  Infectious Disease  Progress Note    Patient Name: Juhi Taylor  MRN: [id number]  Admission Date: 7/3/2024  Length of Stay: 26 days  Attending Physician: Bayron Roy*  Primary Care Provider: Tony Sadler MD    Isolation Status: Special Contact  Assessment/Plan:      ID  Clostridium difficile infection  74-year-old female with multiple comorbidities transferred from [facility] for higher level of care.  She is on dialysis and had recent antibiotics due to, what she reports as a superficial skin infection.  She reports having diarrhea for 3 weeks but it has worsened recently.  C diff testing on 07/21 was positive and she was started on p.o. vancomycin appropriately.  Her diarrhea has failed to resolve.  She has had ongoing leukocytosis which is not resolving.  This is concerning for complicated C diff. CT 7/28 showed colitis.  Rectal tube placed. Vancomycin was switched to Fidaxomycin 7/28. She is afebrile and appears stable.     Plan:  Continue Fidaxomicin  Agree with GI consult as patient may need fecal transplant in the future  Monitor symptoms closely  Discussed with ID staff, we will follow          Thank you for the consult. Please secure chat for any questions.  Amelia Traylor PA-C      Subjective:     Principal Problem:ESRD (end stage renal disease)    HPI: Patient is a 74-year-old female with a past medical history of CKD stage 5, RCC status post right nephrectomy, type 2 diabetes, obesity, CAD, HF, PD catheter placement on 6/4.  She was transferred from [facility] after she presented there on 06/18/24 as for catheter site was leaking and she was fluid overloaded.  Her hospital course was complicated by PEA arrest and resuscitation.  She was found to have elevated troponins.  It was also complicated by dialysis line infection which she reports was superficial.  Patient was transferred to [facility] for higher level of care.  She has  had diarrhea and C diff testing was positive on 07/21/2024 and she was started on oral vancomycin 125 mg p.o. q.6 H. she has continued to have diarrhea so ID was consulted.    Patient chart review shows ongoing leukocytosis with currently at 16.  Patient seen and reports ongoing diarrhea in the rectal tube.  She has pain at her PD cath site but denies any diffuse abdominal pain.  Prior blood and stool cultures are negative.  She does not feel well but denies any fever, chills, sweating.  She clarifies that the diarrhea has been ongoing for three or so weeks but has worsened recently.  Interval History: Afebrile. WBC increased. Now on fidaxomycin. GI consulted.    Review of Systems   Constitutional:  Negative for chills, diaphoresis and fever.   Respiratory:  Negative for cough, chest tightness and shortness of breath.    Cardiovascular:  Negative for chest pain and leg swelling.   Gastrointestinal:  Positive for abdominal distention, abdominal pain and diarrhea. Negative for constipation, nausea and vomiting.   Musculoskeletal:  Negative for back pain.   Skin:  Negative for color change and rash.   Neurological:  Negative for headaches.   Psychiatric/Behavioral:  Negative for agitation and confusion. The patient is not nervous/anxious.      Objective:     Vital Signs (Most Recent):  Temp: 99 °F (37.2 °C) (07/29/24 1133)  Pulse: 99 (07/29/24 1615)  Resp: 18 (07/29/24 1338)  BP: (!) 89/55 (07/29/24 1615)  SpO2: 96 % (07/29/24 1133) Vital Signs (24h Range):  Temp:  [98.3 °F (36.8 °C)-99 °F (37.2 °C)] 99 °F (37.2 °C)  Pulse:  [] 99  Resp:  [18-20] 18  SpO2:  [93 %-97 %] 96 %  BP: ()/(50-65) 89/55     Weight: 128.5 kg (283 lb 4.7 oz)  Body mass index is 44.37 kg/m².    Estimated Creatinine Clearance: 11.3 mL/min (A) (based on SCr of 6.3 mg/dL (H)).     Physical Exam  Constitutional:       General: She is not in acute distress.     Appearance: Normal appearance. She is well-developed. She is not ill-appearing  or diaphoretic.   HENT:      Head: Normocephalic and atraumatic.      Right Ear: External ear normal.      Left Ear: External ear normal.      Nose: Nose normal.   Eyes:      General: No scleral icterus.        Right eye: No discharge.         Left eye: No discharge.      Extraocular Movements: Extraocular movements intact.      Conjunctiva/sclera: Conjunctivae normal.   Cardiovascular:      Rate and Rhythm: Tachycardia present.   Pulmonary:      Effort: Pulmonary effort is normal. No respiratory distress.      Breath sounds: No stridor.   Abdominal:      Palpations: Abdomen is soft.      Tenderness: There is abdominal tenderness.      Comments: Former PD site c/d/i   Musculoskeletal:         General: Normal range of motion.   Skin:     General: Skin is dry.      Findings: No erythema or rash.   Neurological:      General: No focal deficit present.      Mental Status: She is alert and oriented to person, place, and time. Mental status is at baseline.      Cranial Nerves: No cranial nerve deficit.   Psychiatric:         Mood and Affect: Mood normal.         Behavior: Behavior normal.         Thought Content: Thought content normal.         Judgment: Judgment normal.          Significant Labs: Blood Culture:   Recent Labs   Lab 06/24/24  2016 06/29/24  1607 07/17/24  1737 07/17/24  1755   LABBLOO No growth after 5 days. No growth after 5 days.  No growth after 5 days. No growth after 5 days. No growth after 5 days.     CBC:   Recent Labs   Lab 07/28/24 0636 07/29/24 0419   WBC 16.63* 17.92*   HGB 7.5* 7.3*   HCT 24.7* 23.3*   * 140*     CMP:   Recent Labs   Lab 07/28/24 0636 07/29/24  0419    136   K 4.2 4.0    109   CO2 16* 15*   * 141*   BUN 43* 47*   CREATININE 5.7* 6.3*   CALCIUM 9.6 8.9   ALBUMIN 2.2* 2.0*   ANIONGAP 13 12     Microbiology Results (last 7 days)       Procedure Component Value Units Date/Time    Blood culture [[id number]] Collected: 07/29/24 1345    Order Status: Sent  Specimen: Blood Updated: 07/29/24 1417    Blood culture [[id number]] Collected: 07/29/24 1346    Order Status: Sent Specimen: Blood Updated: 07/29/24 1417    Stool culture [[id number]] Collected: 07/20/24 1953    Order Status: Completed Specimen: Stool Updated: 07/23/24 0935     Stool Culture No Salmonella,Shigella,Vibrio,Campylobacter,Yersinia isolated.    Blood culture [[id number]] Collected: 07/17/24 1755    Order Status: Completed Specimen: Blood from Peripheral, Hand, Right Updated: 07/22/24 2012     Blood Culture, Routine No growth after 5 days.    Blood culture [[id number]] Collected: 07/17/24 1737    Order Status: Completed Specimen: Blood from Peripheral, Lower Arm, Right Updated: 07/22/24 2012     Blood Culture, Routine No growth after 5 days.          Recent Lab Results         07/29/24  1131   07/29/24  0828   07/29/24  0419   07/28/24 2020        Albumin     2.0         Anion Gap     12         Aniso     Moderate         PTT     73.6  Comment: Refer to local heparin nomogram for intensity/dose specific   therapeutic   range.           Baso #     0.14         Basophilic Stippling     Occasional         Basophil %     0.8         BUN     47         [place]/Echinocytes     Occasional         Calcium     8.9         Chloride     109         CO2     15         Creatinine     6.3         Differential Method     Automated         eGFR     6.6         Eos #     0.1         Eos %     0.6         Glucose     141         Gran # (ANC)     15.1         Gran %     84.2         Hematocrit     23.3         Hemoglobin     7.3         Immature Grans (Abs)     0.26  Comment: Mild elevation in immature granulocytes is non specific and   can be seen in a variety of conditions including stress response,   acute inflammation, trauma and pregnancy. Correlation with other   laboratory and clinical findings is essential.           Immature Granulocytes     1.5         Lymph #     1.4         Lymph %     7.7         Magnesium       2.1         MCH     26.7         MCHC     31.3         MCV     85         Mono #     0.9         Mono %     5.2         MPV     10.7         nRBC     1         Ovalocytes     Occasional         Phosphorus Level     4.7              4.7         Platelet Estimate     Appears normal         Platelet Count     140         POCT Glucose 169   153     133       Poikilocytosis     Slight         Potassium     4.0         RBC     2.73         RDW     22.5         Sodium     136         Target Cells     Occasional         Toxic Granulation     Present         WBC     17.92                 Significant Imaging:       CT Abdomen Pelvis Without Contrast [[id number]] (Abnormal) Resulted: 07/28/24 2121   Order Status: Completed Updated: 07/28/24 2123   Narrative:     EXAMINATION:  CT ABDOMEN PELVIS WITHOUT CONTRAST    CLINICAL HISTORY:  c diff, eval for toxic megacolon;    TECHNIQUE:  Low dose axial images, sagittal and coronal reformations were obtained from the lung bases to the pubic symphysis, Oral contrast was not administered.    COMPARISON:  08/05/2021    FINDINGS:  Heart: The heart is enlarged.  Prominent coronary atherosclerosis.    Lung Bases: Small basilar pleural effusions with mild associated atelectasis or consolidation.    Liver: The liver is enlarged.  No focal abnormality.  Limited characterization on this noncontrast study.    Gallbladder: Status post cholecystectomy.    Bile Ducts: No evidence of dilated ducts.    Pancreas: No mass or peripancreatic fat stranding.    Spleen: Unremarkable.    Adrenals: Unremarkable.    Kidneys/ Ureters: Status post right nephrectomy.  Stable probable tiny subcentimeter cyst with minimal wall calcification on the left.  Nonobstructing nephrolithiasis of the left kidney in the upper pole.  No hydronephrosis or hydroureter on the left.    Bladder: No evidence of wall thickening.    Reproductive organs: Status post hysterectomy.    GI Tract/Mesentery: There is prominent wall  thickening noted to the proximal transverse colon on the right.  Wall thickening is less prominent in the distal transverse colon and descending colon.  Findings could be associated with colitis.  An underlying mass in the proximal transverse colon could not be excluded.  Colonoscopy follow-up post treatment recommended.  The colon is not significantly dilated.    There is a rectal tube and distal balloon present.    Peritoneal Space: There is mild ascites in the abdomen and pelvis.  No free air.    Retroperitoneum: No significant adenopathy.    Abdominal wall: There is moderate edema in the subcutaneous fat in the flank region bilaterally.  Minimal fluid in the subcutaneous fat in the lower fat pannus on the left may be associated with injections    Vasculature: No significant atherosclerosis or aneurysm.    Bones: No acute fracture.    Degenerative disc changes in the lumbar spine.   Impression:       1. Prominent wall thickening of the transverse colon and descending colon most prominent at the proximal transverse colon.  The findings could represent colitis.  An underlying mass in the proximal transverse colon cannot be excluded.  See above comments.  Follow-up is recommended.  2. Hepatomegaly with mild ascites in the abdomen and pelvis.  3. Cardiomegaly with prominent coronary atherosclerosis.  4. Small bibasilar pleural effusions with mild associated atelectasis or consolidation.  5. Status post right nephrectomy.  Probable small subcentimeter minimally complex left renal cyst with minimal wall calcification.  No significant change.  6. See above comments also.  7. This report was flagged in Epic as abnormal.      Electronically signed by: Russell Felix  Date: 07/28/2024  Time: 21:21

## 2024-07-29 NOTE — NURSING
Pt to [facility] via bed with heparin gtt running at 15u/kg/hr. Tx started via L groin CVC. BFR titrated as tolerated. C diff precautions maintained.

## 2024-07-29 NOTE — SUBJECTIVE & OBJECTIVE
Past Medical History:   Diagnosis Date    Anticoagulant long-term use     Arthritis     Breast cancer 2014    invasive lobular carcinoma    Cancer of kidney 11/2020    RIGHT KIDNEY CANCER    CHF (congestive heart failure)     Coronary artery disease dx 2005    Depression     Diabetes mellitus     Diastolic heart failure secondary to hypertension     Gout     Hyperlipemia     Hypertension     Hypertrophy of nasal turbinates     Kidney mass 2020    Right    Levoscoliosis     Lung nodule     left    Multiple thyroid nodules     NICOLE (obstructive sleep apnea)     uses C-PAP    Pulmonary hypertension     Severe sepsis 07/01/2024       Past Surgical History:   Procedure Laterality Date    AORTOGRAPHY N/A 12/04/2020    Procedure: Aortogram;  Surgeon: Paul Pedersen MD;  Location: [facility] CATH;  Service: Cardiology;  Laterality: N/A;    BREAST SURGERY      BRONCHOSCOPY N/A 7/15/2024    Procedure: BRONCHOSCOPY, FLEXIBLE;  Surgeon: Tanya Beckham MD;  Location: [address];  Service: General;  Laterality: N/A;    CARDIAC CATHETERIZATION  12/2020    CHOLECYSTECTOMY      COLONOSCOPY      multi -last 2014     CORONARY ARTERY BYPASS GRAFT      ESOPHAGOGASTRODUODENOSCOPY      2012     HAND SURGERY Right     INSERTION OF CATHETER N/A 6/23/2024    Procedure: Insertion,catheter;  Surgeon: Meli Griffin MD;  Location: [facility] OR;  Service: Vascular;  Laterality: N/A;  ORIGINAL CATHETER WAS REPOSITIONED.  NO NEW CATHETER WAS PLACED    INSERTION OF TUNNELED CENTRAL VENOUS HEMODIALYSIS CATHETER Right 7/11/2024    Procedure: Insertion, Catheter, Central Venous, Hemodialysis;  Surgeon: Hawa Landa MD;  Location: [facility] CATH LAB;  Service: Interventional Nephrology;  Laterality: Right;    INSERTION, CATHETER, DIALYSIS, PERITONEAL N/A 6/4/2024    Procedure: IN Insertion Catheter, Dialysis, Peritoneal - laparoscopic;  Surgeon: Rodriguez Catalan Jr., MD;  Location: [facility];  Service: General;  Laterality: N/A;    LAPAROSCOPIC  "ROBOT-ASSISTED SURGICAL REMOVAL OF KIDNEY USING DA CHELLE XI Right 03/10/2022    Procedure: XI ROBOTIC NEPHRECTOMY- radical;  Surgeon: Rolando Ramirez MD;  Location: [facility] OR;  Service: Urology;  Laterality: Right;    MASTECTOMY W/ SENTINEL NODE BIOPSY Bilateral 01/21/2015    bilateral "dog ears"    NASAL SINUS SURGERY  2015    Dr Bryant FESS/cauterization turbinate     PARTIAL HYSTERECTOMY  1989    PERCUTANEOUS TRANSLUMINAL BALLOON ANGIOPLASTY OF CORONARY ARTERY  12/04/2020    Procedure: Angioplasty-coronary;  Surgeon: Paul Pedersen MD;  Location: [facility] CATH;  Service: Cardiology;;    PERITONEAL CATHETER REMOVAL N/A 7/15/2024    Procedure: REMOVAL, CATHETER, DIALYSIS, PERITONEAL;  Surgeon: Tanya Beckham MD;  Location: [facility] OR [address] [place] [address];  Service: General;  Laterality: N/A;    REMOVAL OF HEMODIALYSIS CATHETER  7/11/2024    Procedure: REMOVAL, CATHETER, HEMODIALYSIS;  Surgeon: Hawa Landa MD;  Location: [facility] CATH LAB;  Service: Interventional Nephrology;;    RENAL BIOPSY Right     9/20/2021 EJ    TUBAL LIGATION      ULTRASOUND GUIDANCE  12/04/2020    Procedure: Ultrasound Guidance;  Surgeon: Paul Pedersen MD;  Location: [facility] CATH;  Service: Cardiology;;       Review of patient's allergies indicates:   Allergen Reactions    Percocet [oxycodone-acetaminophen] Itching    Januvia [sitagliptin]     Jardiance [empagliflozin]      Leg cramps    Lipitor [atorvastatin] Other (See Comments)     Severe leg pain    Linaclotide Other (See Comments) and Nausea And Vomiting     Does not remember    Lubiprostone Other (See Comments) and Palpitations     Does not remember       PTA Medications   Medication Sig    allopurinoL (ZYLOPRIM) 300 MG tablet Take 1 tablet (300 mg total) by mouth once daily.    amLODIPine (NORVASC) 10 MG tablet Take 10 mg by mouth once daily.    calcitRIOL (ROCALTROL) 0.5 MCG Cap Take 0.5 mcg by mouth once daily.    carvediloL (COREG) 25 MG tablet Take 1 tablet (25 mg total) by mouth 2 (two) times " daily.    cloNIDine (CATAPRES) 0.1 MG tablet Take 1 tablet (0.1 mg total) by mouth 3 (three) times daily as needed (PRN SBP > 165 mmHg).    cloNIDine 0.1 mg/24 hr td ptwk (CATAPRES) 0.1 mg/24 hr Place 1 patch onto the skin every 7 days.    cyanocobalamin (VITAMIN B-12) 100 MCG tablet Take 100 mcg by mouth once daily.    ELIQUIS 5 mg Tab TAKE 1 TABLET(5 MG) BY MOUTH TWICE DAILY    evolocumab (REPATHA SURECLICK) 140 mg/mL PnIj Inject 1 mL (140 mg total) into the skin every 14 (fourteen) days.    furosemide (LASIX) 40 MG tablet Take 40 mg by mouth once daily.    blood sugar diagnostic (TRUE METRIX GLUCOSE TEST STRIP) Strp Use 1x daily. Insurance preferred.    blood sugar diagnostic Strp To check BG 1 time daily, to use with insurance preferred meter    lancets Misc To check BG 1 times daily, to use with insurance preferred meter    loratadine (CLARITIN) 10 mg tablet Take 10 mg by mouth once daily.     Family History       Problem Relation (Age of Onset)    Asthma Daughter    Birth defects Daughter    Breast cancer Mother, Maternal Aunt    Depression Daughter    Drug abuse Daughter, Daughter    Glaucoma Sister    Hepatitis Brother    Hypertension Father    Learning disabilities Daughter    Mental illness Daughter    Stroke Father          Tobacco Use    Smoking status: Former     Current packs/day: 0.00     Types: Cigarettes     Start date: 2016     Quit date: 2016     Years since quittin.5    Smokeless tobacco: Never    Tobacco comments:     quit    Substance and Sexual Activity    Alcohol use: No    Drug use: No    Sexual activity: Not Currently     Partners: Male     Birth control/protection: None       Objective:     Vital Signs (Most Recent):  Temp: 98.3 °F (36.8 °C) (24)  Pulse: 93 (24)  Resp: 18 (24)  BP: (!) 94/54 (24)  SpO2: 95 % (24) Vital Signs (24h Range):  Temp:  [98.3 °F (36.8 °C)-99.3 °F (37.4 °C)] 98.3 °F (36.8 °C)  Pulse:   [] 93  Resp:  [18-20] 18  SpO2:  [93 %-98 %] 95 %  BP: ()/(54-63) 94/54     Weight: 128.5 kg (283 lb 4.7 oz)  Body mass index is 44.37 kg/m².    SpO2: 95 %         Intake/Output Summary (Last 24 hours) at 7/29/2024 1040  Last data filed at 7/28/2024 1800  Gross per 24 hour   Intake 206.67 ml   Output --   Net 206.67 ml       Lines/Drains/Airways       Central Venous Catheter Line  Duration                  Hemodialysis Catheter 07/11/24 1219 left subclavian 17 days    Trialysis (Dialysis) Catheter 07/20/24 1120 left femoral 8 days              Peripheral Intravenous Line  Duration                  Peripheral IV - Single Lumen 07/28/24 1931 22 G Left Antecubital <1 day                     Physical Exam  Constitutional:       Appearance: Normal appearance.   HENT:      Head: Atraumatic.   Eyes:      Conjunctiva/sclera: Conjunctivae normal.   Cardiovascular:      Rate and Rhythm: Normal rate. Rhythm irregular.      Heart sounds: Murmur heard.   Pulmonary:      Effort: Pulmonary effort is normal.   Abdominal:      Palpations: Abdomen is soft.      Tenderness: There is no abdominal tenderness.   Musculoskeletal:      Right lower leg: Edema present.      Left lower leg: Edema present.   Skin:     General: Skin is warm.   Neurological:      Mental Status: She is alert.            Significant Labs: All pertinent lab results from the last 24 hours have been reviewed.    Significant Imaging:     Echocardiogram (7/3/2024)    Left Ventricle: The left ventricle is moderately dilated. Normal wall thickness. Global hypokinesis present. There is severely reduced systolic function with a visually estimated ejection fraction of 20 - 25%.    Right Ventricle: Mild right ventricular enlargement. Wall thickness is normal. Right ventricle wall motion has global hypokinesis. Systolic function is moderately reduced.    Left Atrium: Left atrium is severely dilated.    Right Atrium: Right atrium is severely dilated.    Aortic  Valve: The aortic valve is a trileaflet valve. There is mild aortic valve sclerosis.    Mitral Valve: There is mild regurgitation.    Tricuspid Valve: There is severe regurgitation.    Pulmonary Artery: There is moderate pulmonary hypertension. The estimated pulmonary artery systolic pressure is 61 mmHg.    IVC/SVC: Elevated venous pressure at 15 mmHg.    Pericardium: There is a trivial circumferential effusion. No indication of cardiac tamponade.

## 2024-07-29 NOTE — CONSULTS
"[facility] ([place])  Interventional Cardiology  Consult Note    Patient Name: Juhi Taylor  MRN: [id number]  Admission Date: 7/3/2024  Hospital Length of Stay: 26 days  Code Status: DNR   Attending Provider: Bayron Roy*  Consulting Provider: Enriqueta Pearson MD  Primary Care Physician: Tony Sadler MD  Principal Problem:ESRD (end stage renal disease)    Patient information was obtained from patient, ER records, and primary team.     Inpatient consult to Interventional Cardiology  Consult performed by: Enriqueta Pearson MD  Consult ordered by: Bubba Ochoa MD        Subjective:     HPI:  74 year old female with Hx of Diabetes, CAD s/p PCI to LCx and LAD (11/202), HFrEF, RCC s/p R. Nephrectomy and CKD  stage 5 with recent PD catheter placement on 6/4 who was transferred from [facility] for higher level of care. Patient initially presented on 6/18 to [facility] for volume overload in the setting of renal failure. She was started on Lasix gtt without improvement. Tunneled line was placed for HD and during her second session she suffered a PEA arrest s/p intubation. MICU course complicated by AF with RVR, sepsis in the setting of line infection and bleeding. She was placed on diltiazem gtt for AF with low EF due to allergy to amiodarone. Patient was admitted to MICU on 7/8 here at [facility] and dialysis was re-initiated after she had previously refused and became encephalopathic. Palliative care was consulted. Patient was made DNR. She developed diarrhea and was found to have C.Difficile. Rectal tube placed and she was started on Vanc. Patient was seen this morning denies any complaints at this time. Patient denies any chest pain/pressure or discomfort, palpitations, shortness of breath, orthopnea, PND or lower extremity edema.Daugther at bedside    Interventional Cardiology Consulted for "Hx of CAD w/ 3 stent with recent cardiac arrest, tte showing ef 20%. May need angiogram"    Past " Medical History:   Diagnosis Date    Anticoagulant long-term use     Arthritis     Breast cancer 2014    invasive lobular carcinoma    Cancer of kidney 11/2020    RIGHT KIDNEY CANCER    CHF (congestive heart failure)     Coronary artery disease dx 2005    Depression     Diabetes mellitus     Diastolic heart failure secondary to hypertension     Gout     Hyperlipemia     Hypertension     Hypertrophy of nasal turbinates     Kidney mass 2020    Right    Levoscoliosis     Lung nodule     left    Multiple thyroid nodules     NICOLE (obstructive sleep apnea)     uses C-PAP    Pulmonary hypertension     Severe sepsis 07/01/2024       Past Surgical History:   Procedure Laterality Date    AORTOGRAPHY N/A 12/04/2020    Procedure: Aortogram;  Surgeon: Paul Pedersen MD;  Location: [facility] CATH;  Service: Cardiology;  Laterality: N/A;    BREAST SURGERY      BRONCHOSCOPY N/A 7/15/2024    Procedure: BRONCHOSCOPY, FLEXIBLE;  Surgeon: Tanya Beckham MD;  Location: [address];  Service: General;  Laterality: N/A;    CARDIAC CATHETERIZATION  12/2020    CHOLECYSTECTOMY      COLONOSCOPY      multi -last 2014     CORONARY ARTERY BYPASS GRAFT      ESOPHAGOGASTRODUODENOSCOPY      2012     HAND SURGERY Right     INSERTION OF CATHETER N/A 6/23/2024    Procedure: Insertion,catheter;  Surgeon: Meli Griffin MD;  Location: [facility] OR;  Service: Vascular;  Laterality: N/A;  ORIGINAL CATHETER WAS REPOSITIONED.  NO NEW CATHETER WAS PLACED    INSERTION OF TUNNELED CENTRAL VENOUS HEMODIALYSIS CATHETER Right 7/11/2024    Procedure: Insertion, Catheter, Central Venous, Hemodialysis;  Surgeon: Hawa Landa MD;  Location: [facility] CATH LAB;  Service: Interventional Nephrology;  Laterality: Right;    INSERTION, CATHETER, DIALYSIS, PERITONEAL N/A 6/4/2024    Procedure: IN Insertion Catheter, Dialysis, Peritoneal - laparoscopic;  Surgeon: Rodriguez Catalan Jr., MD;  Location: [facility] OR;  Service: General;  Laterality: N/A;    LAPAROSCOPIC  "ROBOT-ASSISTED SURGICAL REMOVAL OF KIDNEY USING DA CHELLE XI Right 03/10/2022    Procedure: XI ROBOTIC NEPHRECTOMY- radical;  Surgeon: Rolando Ramirez MD;  Location: [facility] OR;  Service: Urology;  Laterality: Right;    MASTECTOMY W/ SENTINEL NODE BIOPSY Bilateral 01/21/2015    bilateral "dog ears"    NASAL SINUS SURGERY  2015    Dr Bryant FESS/cauterization turbinate     PARTIAL HYSTERECTOMY  1989    PERCUTANEOUS TRANSLUMINAL BALLOON ANGIOPLASTY OF CORONARY ARTERY  12/04/2020    Procedure: Angioplasty-coronary;  Surgeon: Paul Pedersen MD;  Location: [facility] CATH;  Service: Cardiology;;    PERITONEAL CATHETER REMOVAL N/A 7/15/2024    Procedure: REMOVAL, CATHETER, DIALYSIS, PERITONEAL;  Surgeon: Tanya Beckham MD;  Location: [facility] OR [address] [place] [address];  Service: General;  Laterality: N/A;    REMOVAL OF HEMODIALYSIS CATHETER  7/11/2024    Procedure: REMOVAL, CATHETER, HEMODIALYSIS;  Surgeon: Hawa Landa MD;  Location: [facility] CATH LAB;  Service: Interventional Nephrology;;    RENAL BIOPSY Right     9/20/2021 EJ    TUBAL LIGATION      ULTRASOUND GUIDANCE  12/04/2020    Procedure: Ultrasound Guidance;  Surgeon: Paul Pedersen MD;  Location: [facility] CATH;  Service: Cardiology;;       Review of patient's allergies indicates:   Allergen Reactions    Percocet [oxycodone-acetaminophen] Itching    Januvia [sitagliptin]     Jardiance [empagliflozin]      Leg cramps    Lipitor [atorvastatin] Other (See Comments)     Severe leg pain    Linaclotide Other (See Comments) and Nausea And Vomiting     Does not remember    Lubiprostone Other (See Comments) and Palpitations     Does not remember       PTA Medications   Medication Sig    allopurinoL (ZYLOPRIM) 300 MG tablet Take 1 tablet (300 mg total) by mouth once daily.    amLODIPine (NORVASC) 10 MG tablet Take 10 mg by mouth once daily.    calcitRIOL (ROCALTROL) 0.5 MCG Cap Take 0.5 mcg by mouth once daily.    carvediloL (COREG) 25 MG tablet Take 1 tablet (25 mg total) by mouth 2 (two) times " daily.    cloNIDine (CATAPRES) 0.1 MG tablet Take 1 tablet (0.1 mg total) by mouth 3 (three) times daily as needed (PRN SBP > 165 mmHg).    cloNIDine 0.1 mg/24 hr td ptwk (CATAPRES) 0.1 mg/24 hr Place 1 patch onto the skin every 7 days.    cyanocobalamin (VITAMIN B-12) 100 MCG tablet Take 100 mcg by mouth once daily.    ELIQUIS 5 mg Tab TAKE 1 TABLET(5 MG) BY MOUTH TWICE DAILY    evolocumab (REPATHA SURECLICK) 140 mg/mL PnIj Inject 1 mL (140 mg total) into the skin every 14 (fourteen) days.    furosemide (LASIX) 40 MG tablet Take 40 mg by mouth once daily.    blood sugar diagnostic (TRUE METRIX GLUCOSE TEST STRIP) Strp Use 1x daily. Insurance preferred.    blood sugar diagnostic Strp To check BG 1 time daily, to use with insurance preferred meter    lancets Misc To check BG 1 times daily, to use with insurance preferred meter    loratadine (CLARITIN) 10 mg tablet Take 10 mg by mouth once daily.     Family History       Problem Relation (Age of Onset)    Asthma Daughter    Birth defects Daughter    Breast cancer Mother, Maternal Aunt    Depression Daughter    Drug abuse Daughter, Daughter    Glaucoma Sister    Hepatitis Brother    Hypertension Father    Learning disabilities Daughter    Mental illness Daughter    Stroke Father          Tobacco Use    Smoking status: Former     Current packs/day: 0.00     Types: Cigarettes     Start date: 2016     Quit date: 2016     Years since quittin.5    Smokeless tobacco: Never    Tobacco comments:     quit    Substance and Sexual Activity    Alcohol use: No    Drug use: No    Sexual activity: Not Currently     Partners: Male     Birth control/protection: None       Objective:     Vital Signs (Most Recent):  Temp: 98.3 °F (36.8 °C) (24)  Pulse: 93 (24)  Resp: 18 (24)  BP: (!) 94/54 (24)  SpO2: 95 % (24) Vital Signs (24h Range):  Temp:  [98.3 °F (36.8 °C)-99.3 °F (37.4 °C)] 98.3 °F (36.8 °C)  Pulse:   [] 93  Resp:  [18-20] 18  SpO2:  [93 %-98 %] 95 %  BP: ()/(54-63) 94/54     Weight: 128.5 kg (283 lb 4.7 oz)  Body mass index is 44.37 kg/m².    SpO2: 95 %         Intake/Output Summary (Last 24 hours) at 7/29/2024 1040  Last data filed at 7/28/2024 1800  Gross per 24 hour   Intake 206.67 ml   Output --   Net 206.67 ml       Lines/Drains/Airways       Central Venous Catheter Line  Duration                  Hemodialysis Catheter 07/11/24 1219 left subclavian 17 days    Trialysis (Dialysis) Catheter 07/20/24 1120 left femoral 8 days              Peripheral Intravenous Line  Duration                  Peripheral IV - Single Lumen 07/28/24 1931 22 G Left Antecubital <1 day                     Physical Exam  Constitutional:       Appearance: Normal appearance.   HENT:      Head: Atraumatic.   Eyes:      Conjunctiva/sclera: Conjunctivae normal.   Cardiovascular:      Rate and Rhythm: Normal rate. Rhythm irregular.      Heart sounds: Murmur heard.   Pulmonary:      Effort: Pulmonary effort is normal.   Abdominal:      Palpations: Abdomen is soft.      Tenderness: There is no abdominal tenderness.   Musculoskeletal:      Right lower leg: Edema present.      Left lower leg: Edema present.   Skin:     General: Skin is warm.   Neurological:      Mental Status: She is alert.            Significant Labs: All pertinent lab results from the last 24 hours have been reviewed.    Significant Imaging:     Echocardiogram (7/3/2024)    Left Ventricle: The left ventricle is moderately dilated. Normal wall thickness. Global hypokinesis present. There is severely reduced systolic function with a visually estimated ejection fraction of 20 - 25%.    Right Ventricle: Mild right ventricular enlargement. Wall thickness is normal. Right ventricle wall motion has global hypokinesis. Systolic function is moderately reduced.    Left Atrium: Left atrium is severely dilated.    Right Atrium: Right atrium is severely dilated.    Aortic  Valve: The aortic valve is a trileaflet valve. There is mild aortic valve sclerosis.    Mitral Valve: There is mild regurgitation.    Tricuspid Valve: There is severe regurgitation.    Pulmonary Artery: There is moderate pulmonary hypertension. The estimated pulmonary artery systolic pressure is 61 mmHg.    IVC/SVC: Elevated venous pressure at 15 mmHg.    Pericardium: There is a trivial circumferential effusion. No indication of cardiac tamponade.     Assessment and Plan:     74 year old elderly women with multiple co-morbidities, ESRD with sepsis and recent PEA arrest with post-arrest TTE with EF reduced from 40% to 20-25%. Palliative care consulted during patients hospital course and GOC discussed. Per notes patient expressed she does not want further intervention; however daughter wants to continue treatments.   - Discussed with patient regarding her wishes and patient continues to express that she would not like to be resuscitated as she previously communicated   - Patient at this time is not a candidate for any intervention with angiogram given co-morbidities and patients clinical course with sepsis int he setting of C.Diff, Anemia, thrombocytopenia and renal failure.   - Recommend optimization of medical therapy and GDMT    Thank you for this consult.Please follow up Attending Attestation for further recommendations. Please call if any questions.       Enriqueta Pearson MD  Interventional Cardiology   [facility] ([place])

## 2024-07-29 NOTE — PLAN OF CARE
Problem: Adult Inpatient Plan of Care  Goal: Plan of Care Review  Outcome: Progressing  Goal: Patient-Specific Goal (Individualized)  Outcome: Progressing  Goal: Absence of Hospital-Acquired Illness or Injury  Outcome: Progressing  Goal: Optimal Comfort and Wellbeing  Outcome: Progressing  Goal: Readiness for Transition of Care  Outcome: Progressing     Problem: Diabetes Comorbidity  Goal: Blood Glucose Level Within Targeted Range  Outcome: Progressing     Problem: Sepsis/Septic Shock  Goal: Optimal Coping  Outcome: Progressing  Goal: Absence of Bleeding  Outcome: Progressing  Goal: Blood Glucose Level Within Targeted Range  Outcome: Progressing  Goal: Absence of Infection Signs and Symptoms  Outcome: Progressing  Goal: Optimal Nutrition Intake  Outcome: Progressing     Problem: Bariatric Environmental Safety  Goal: Safety Maintained with Care  Outcome: Progressing     Problem: Wound  Goal: Optimal Coping  Outcome: Progressing  Goal: Optimal Functional Ability  Outcome: Progressing  Goal: Absence of Infection Signs and Symptoms  Outcome: Progressing  Goal: Improved Oral Intake  Outcome: Progressing  Goal: Optimal Pain Control and Function  Outcome: Progressing  Goal: Skin Health and Integrity  Outcome: Progressing  Goal: Optimal Wound Healing  Outcome: Progressing     Problem: Skin Injury Risk Increased  Goal: Skin Health and Integrity  Outcome: Progressing     Problem: Fall Injury Risk  Goal: Absence of Fall and Fall-Related Injury  Outcome: Progressing     Problem: Coping Ineffective  Goal: Effective Coping  Outcome: Progressing     Problem: CRRT (Continuous Renal Replacement Therapy)  Goal: Safe, Effective Therapy Delivery  Outcome: Progressing  Goal: Hemodynamic Stability  Outcome: Progressing  Goal: Body Temperature Maintained in Desired Range  Outcome: Progressing  Goal: Absence of Infection Signs and Symptoms  Outcome: Progressing     Problem: Infection  Goal: Absence of Infection Signs and  Symptoms  Outcome: Progressing     Problem: Hemodialysis  Goal: Safe, Effective Therapy Delivery  Outcome: Progressing  Goal: Effective Tissue Perfusion  Outcome: Progressing  Goal: Absence of Infection Signs and Symptoms  Outcome: Progressing     Problem: Mechanical Ventilation Invasive  Goal: Effective Communication  Outcome: Progressing  Goal: Optimal Device Function  Outcome: Progressing  Goal: Mechanical Ventilation Liberation  Outcome: Progressing  Goal: Optimal Nutrition Delivery  Outcome: Progressing  Goal: Absence of Device-Related Skin and Tissue Injury  Outcome: Progressing  Goal: Absence of Ventilator-Induced Lung Injury  Outcome: Progressing     Problem: Artificial Airway  Goal: Effective Communication  Outcome: Progressing  Goal: Optimal Device Function  Outcome: Progressing  Goal: Absence of Device-Related Skin or Tissue Injury  Outcome: Progressing  Pt Aox4, turned per protocol. No PRNs given. Call light within reach, safety measures in place, rounded per facility policy.

## 2024-07-29 NOTE — PROGRESS NOTES
[facility] - Stepdown Flex ([place] [id number])  [facility] Medicine  Progress Note    Patient Name: Juhi Taylor  MRN: [id number]  Patient Class: IP- Inpatient   Admission Date: 7/3/2024  Length of Stay: 26 days  Attending Physician: Bayron Roy*  Primary Care Provider: Tony Sadler MD        Subjective:     Principal Problem:ESRD (end stage renal disease)        HPI:  Juhi Taylor is a 74-year-old female with a past medical history of CKD stage 5 with recent PD catheter placement on 6/4, RCC s/p right nephrectomy, T2DM, obesity, CAD s/p PCI to Lcx and LAD in 11/2020 and HFmREF who is admitted as a transfer from [facility] for higher level of care and further cardiac evaluation. Nephrology consulted for hemodialysis. She initially presented on 6/18/24 to [facility] for hypervolemia in the setting of CKD 4-5 for which she had undergone elective PD catheter placement complicated by catheter site leaking. She had a complex hospital course, was initially placed on furosemide gtt without significant improvement and later underwent placement of tunneled line for HD with volume removal. During her second dialysis session she went into PEA arrest for which she was resuscitated, intubated and transferred to ICU. She was noted to have elevated troponin and subsequent TTE found reduced EF from 40-45% to 20-25% for which coronary angiography was recommended. Her hospital course was also complicated by dialysis line infection, significant bleeding around the catheter site, bradycardia and subsequent atrial fibrillation with RVR. Given the catheter site bleeding, anticoagulation was temporarily held. She has reported allergy to amiodarone, was initially placed on diltiazem for atrial fibrillation despite reduced EF. She discussed GOC at outside hospital where it appears that she lacked capacity per note and decided to pursue higher level of care at [facility]. At that time, gen surgery was planning to place a left  tunneled catheter due to concerns of the right side not being suitable     Upon admit, patient appeared drowsy but conversant. Daughter reports that she typically gets lethargic 2/2 to the volume overload due to not receiving dialysis. Patient reported b/l left lower extremity pain. Daughter concerned about her not having dialysis in 4 days and wants a session today.      Pt initially refusing dialysis. Seen by nephrology. VBG 7.220/33.4/34/13.7/-14. Critical care medicine consulted for emergent trialysis line placement and dialysis.     Overview/Hospital Course:  Ms. Taylor was admitted to MICU on 7/8 for urgent dialysis line placement and initiated of HD. Patient received short run of CRRT overnight without issue. Encephalopathy improving with continued CRRT treatments. Persistent AFib with RVR, received Digoxin loading dose (renally dosed), restarted PO Carvedilol. Patient remains in persistent AFib RVR despite therapeutic digoxin level and uptitration of Carvedilol levels, started on Amiodarone infusion overnight. Transitioned to amio PO, started on heparin gtt for afib AC. Tolerated HD well overnight without complications and less encephalopathic upon exam. Planning for gen surg to remove PD catheter. Upon induction pt vomited copious amounts of bile, was intubated. PD cathter removed successfully. Extubated to NC 7/16.      7/18:  Patient made DNR after Palliative Care discussions yesterday.  Tolerated SLED 12 hours overnight.  Discussed difficulties regarding HD line with Interventional Nephrology and plans to replace it next week if patient it amenable.  Patient was stepped down from ICU.  7/19:  Stepped back up to medical ICU with plans for overnight SLED x 3 days.     On 7/19 Nephrology requested that the patient be stepped back up to the medical ICU for SLED. A temporary trialysis HD catheter was placed on 7/20/24 in the groin. She also began having diarrhea, which tested positive for Clostridium  difficile so she was started on PO vancomycin 7/22/24. HD trial on 7/24; remained HDS. Ready for stepdown.    ID consulted and patient changed to fidaximicin.     Interval History: No acute events. AFebrile but leukocytosis  Pt denies new complaints  Getting HD today    Review of Systems  Objective:     Vital Signs (Most Recent):  Temp: 99 °F (37.2 °C) (07/29/24 1133)  Pulse: (!) 113 (07/29/24 1512)  Resp: 18 (07/29/24 1338)  BP: (!) 109/54 (07/29/24 1446)  SpO2: 96 % (07/29/24 1133) Vital Signs (24h Range):  Temp:  [98.3 °F (36.8 °C)-99.3 °F (37.4 °C)] 99 °F (37.2 °C)  Pulse:  [] 113  Resp:  [18-20] 18  SpO2:  [93 %-97 %] 96 %  BP: ()/(54-62) 109/54     Weight: 128.5 kg (283 lb 4.7 oz)  Body mass index is 44.37 kg/m².    Intake/Output Summary (Last 24 hours) at 7/29/2024 1550  Last data filed at 7/28/2024 1800  Gross per 24 hour   Intake 206.67 ml   Output --   Net 206.67 ml      Physical Exam  Vitals and nursing note reviewed.   Constitutional:       General: She is not in acute distress.     Appearance: She is not ill-appearing, toxic-appearing or diaphoretic.   HENT:      Head: Normocephalic and atraumatic.      Right Ear: External ear normal.      Left Ear: External ear normal.      Nose: Nose normal.   Cardiovascular:      Rate and Rhythm: Tachycardia present. Rhythm irregular.      Pulses: Normal pulses.      Heart sounds: Normal heart sounds. No murmur heard.     No friction rub. No gallop.   Pulmonary:      Effort: Pulmonary effort is normal. No respiratory distress.      Breath sounds: Normal breath sounds. No wheezing, rhonchi or rales.   Abdominal:      General: Abdomen is flat. Bowel sounds are normal. There is no distension.      Palpations: Abdomen is soft.      Tenderness: There is no abdominal tenderness. There is no guarding or rebound.   Musculoskeletal:         General: Swelling present. No tenderness. Normal range of motion.      Right lower leg: Edema present.      Left lower leg:  "Edema present.   Skin:     General: Skin is warm and dry.      Coloration: Skin is not jaundiced or pale.   Neurological:      General: No focal deficit present.      Mental Status: She is alert and oriented to person, place, and time. Mental status is at baseline.   Psychiatric:         Mood and Affect: Mood normal.         Behavior: Behavior normal.         MELD 3.0: 20 at 7/23/2024  2:01 PM  MELD-Na: 21 at 7/23/2024  2:01 PM  Calculated from:  Serum Creatinine: On dialysis. Using the maximum value.  Serum Sodium: 140 mmol/L (Using max of 137 mmol/L) at 7/23/2024  2:01 PM  Total Bilirubin: 0.8 mg/dL (Using min of 1 mg/dL) at 7/23/2024  4:02 AM  Serum Albumin: 1.8 g/dL at 7/23/2024  2:01 PM  INR(ratio): 1.1 at 7/21/2024  1:08 PM  Age at listing (hypothetical): 74 years  Sex: Female at 7/23/2024  2:01 PM      Significant Labs:  CBC:  Recent Labs   Lab 07/28/24  0636 07/29/24  0419   WBC 16.63* 17.92*   HGB 7.5* 7.3*   HCT 24.7* 23.3*   * 140*     CMP:  Recent Labs   Lab 07/28/24  0636 07/29/24  0419    136   K 4.2 4.0    109   CO2 16* 15*   * 141*   BUN 43* 47*   CREATININE 5.7* 6.3*   CALCIUM 9.6 8.9   ALBUMIN 2.2* 2.0*   ANIONGAP 13 12     PTINR:  No results for input(s): "INR" in the last 48 hours.    Significant Procedures:   Dobutamine Stress Test with Color Flow: No results found for this or any previous visit.        Assessment/Plan:      * ESRD (end stage renal disease)  Pt previously on PD. Unable to remove enough volume with PD catheter. Initially upgraded to MICU for emergent trialysis placement and CRRT. Now with tunneled dialysis catheter. Tolerated session of HD on 7/12 without complications.      - Nephrology following, appreciate recs  - Strict I/Os  - Renally dose meds  - Avoid nephrotoxic agents  - PD catheter removed 07/15  - Transferred to MICU for SLED  - Tunneled dialysis catheter is not functioning.   - Trialysis catheter placed in left femoral on 7/20.  - Tolerating " iHD  - Received albumin 25 g q8h x 3 days per Nephrology, ending 7/25   - Planning for placement of new tunneled HD catheter next week    Acute deep vein thrombosis (DVT) of femoral vein of left lower extremity  LE venous doppler identified nonocclusive thrombus within the mid and distal femoral vein.   Cont heparin gtt.       Clostridium difficile infection  Diagnosed 7/22/24.  Worsening leukocytosis and persistent diarrhea on PO vancomycin  Rectal tube placed  ID consulted  Stop p.o. vancomycin and start fidaxomicin  CT abd/pelvis ordered to eval for worsening colitis  Cholestyramine for a binder of fidaxomicin  GI consult as patient may need fecal transplant in the future      Diarrhea  +C.diff,       Debility  PT/OT    Palliative care encounter  - Palliative care following, continued GOC with family.   - Patient has requested code status changed to DNR      ACP (advance care planning)  --Palliative care consulted, appreciate their assistance in clarification of GOC/POC with patient and family.      Atrial fibrillation with RVR  --AFib with RVR appears new following cardiac arrest at OSH.   --Patient endorses amiodarone reaction with itching, rash, and oral burning. Refused Sotalol, DCCV, and AICD placement at OSH.   --Was placed on Diltizem infusion and transitioned to PO metoprolol. Amio gtt restarted overnight 7/11 s/t uncontrolled afib with RVR. Transitioned to PO amio.  --Patient's rate remained elevated despite amiodarone. Appeared to respond better to metoprolol.   --Continue Toprol 50 mg daily.   --Apixaban on hold. Anticoagulation with a heparin infusion.      Acute on chronic combined systolic and diastolic heart failure  Echo    Left Ventricle: The left ventricle is moderately dilated. Normal wall thickness. Global hypokinesis present. There is severely reduced systolic function with a visually estimated ejection fraction of 20 - 25%.    Right Ventricle: Mild right ventricular enlargement. Wall  thickness is normal. Right ventricle wall motion has global hypokinesis. Systolic function is moderately reduced.    Left Atrium: Left atrium is severely dilated.    Right Atrium: Right atrium is severely dilated.    Aortic Valve: The aortic valve is a trileaflet valve. There is mild aortic valve sclerosis.    Mitral Valve: There is mild regurgitation.    Tricuspid Valve: There is severe regurgitation.    Pulmonary Artery: There is moderate pulmonary hypertension. The estimated pulmonary artery systolic pressure is 61 mmHg.    IVC/SVC: Elevated venous pressure at 15 mmHg.    Pericardium: There is a trivial circumferential effusion. No indication of cardiac tamponade.     - Will require interventional cardiology for LHC vs PET for reduced EF once euvolemic - deferring given pt's illness and co morbidities, infection  - LifeVest prior to discharge  - Continue Entresto 24-26mg BID per cardiology recs    Severe obesity (BMI >= 40)  Body mass index is 44.37 kg/m². Morbid obesity complicates all aspects of disease management from diagnostic modalities to treatment. Weight loss encouraged and health benefits explained to patient.         Type 2 diabetes mellitus with microalbuminuria, without long-term current use of insulin  --Latest A1C 6.8; takes metformin at home.   --Target -180. Within goal.   --Continue to monitor.       Essential hypertension  --Takes carvedilol + clonidine at home.   --Hold antihypertensives due to hypotension.   --Continue metoprolol for rate control.       VTE Risk Mitigation (From admission, onward)           Ordered     heparin 25,000 units in dextrose 5% (100 units/ml) IV bolus from bag LOW INTENSITY nomogram - [facility]  As needed (PRN)        Question:  Heparin Infusion Adjustment (DO NOT MODIFY ANSWER)  Answer:  [email or web address]\Images\Pharmacy\HeparinInfusions\heparin LOW INTENSITY nomogram for [facility] [id number]    07/21/24 1236     heparin 25,000 units in dextrose 5% (100 units/ml) IV  bolus from bag LOW INTENSITY nomogram - [facility]  As needed (PRN)        Question:  Heparin Infusion Adjustment (DO NOT MODIFY ANSWER)  Answer:  [email or web address]\epic\Images\Pharmacy\HeparinInfusions\heparin LOW INTENSITY nomogram for [facility] [id number]    07/21/24 1236     heparin 25,000 units in dextrose 5% 250 mL (100 units/mL) infusion LOW INTENSITY nomogram - [facility]  Continuous        Question:  Begin at (units/kg/hr)  Answer:  12    07/21/24 1236     heparin 25,000 units in dextrose 5% 250 mL (100 units/mL) infusion (heparin infusion - NO NOMOGRAM)  Continuous         07/20/24 1217     Place sequential compression device  Until discontinued         07/15/24 1330     IP VTE HIGH RISK PATIENT  Once         07/15/24 1330                    Discharge Planning   HARINI: 7/29/2024     Code Status: DNR   Is the patient medically ready for discharge?:     Reason for patient still in hospital (select all that apply): Patient trending condition, Treatment, and Consult recommendations  Discharge Plan A: Skilled Nursing Facility   Discharge Delays: (!) Procedure Scheduling (IR, OR, Labs, Echo, Cath, Echo, EEG)      Bayron Roy MD  Department of Hospital Medicine   [facility] - Stepdown Flex ([place]-[facility])

## 2024-07-29 NOTE — ASSESSMENT & PLAN NOTE
74 year old female with a complicated pmh significant for ESRD on HD, RCC, obesity, CAD s/p PCI, HFrEF, afib, and PEA arrest for whom GI was consulted for C diff that was not responding to vancomycin and possible fecal transplant. She tested positive for C diff on 7/22 and per patient and chart review this was the first time she has had this infection. She was started on vancomycin at that time and despite this her diarrhea persisted and her WBC continued to rise. ID was consulted and switched to fidaxomicin on 7/28 and added cholestyramine. She cannot quantify the number of stools she is having secondary to rectal tube placement, but thinks thinks that since her medications were changed yesterday that the stools have decreased. She has some LLQ pain but I suspect this is secondary to her recently having a PD catheter in that location, but unable to say with certainty. She denies any fever, chills, and the cramping has resolved.    - Continue Fidaxomicin for now and continue to monitor   - No fecal transplant at this time  - No inpatient colonoscopy at this time

## 2024-07-29 NOTE — CONSULTS
[facility] ([place] [address])  Gastroenterology  Consult Note    Patient Name: Juhi Taylor  MRN: [id number]  Admission Date: 7/3/2024  Hospital Length of Stay: 26 days  Code Status: DNR   Attending Provider: Bayron Roy*   Consulting Provider: Irwin Steele DO  Primary Care Physician: Tony Sadler MD  Principal Problem:ESRD (end stage renal disease)    Inpatient consult to Gastroenterology  Consult performed by: Irwin Steele DO  Consult ordered by: Monty Dow Jr., PA        Subjective:     HPI:  Juhi Taylor is a 74-year-old female with a past medical history of CKD stage 5 with recent PD catheter placement on 6/4, RCC s/p right nephrectomy, T2DM, obesity, CAD s/p PCI to Lcx and LAD in 11/2020 and HFmREF who is admitted as a transfer from [facility] for higher level of care and further cardiac evaluation. She has had a complaicated hospital course that has lasted for greater than 1 month- during that time there were multiple complications that included line infection, PEA arrest, and development of afib with RVR. GI has now been consulted for persistent C diff infection.    Regarding her diarrhea, she tested positive for C Diff on 7/22 and was started on vancomycin. Per patient and chart review, this is her first time with this infection. The diarrhea persisted and her WBC continued to rise, so ID and GI were consulted. ON 7/28, ID switched the patient from vancomycin to fidaxomicin and initiated cholestyramine therapy. She reports in improvement in her stool output since these changes were made. She currently has a rectal tube in and is unable to accurately quantify the number of stools she has had. She reports some LLQ tenderness (this is where her PD catheter was previously and she is unable to specify if pain is secondary her diarrheal illness or the PD cath). She denies any fever chills, N/V.    She underwent CT A/P on 7/28 which showed colitis of her distal and  transverse colon, a mass in the transverse colon could not be ruled out, and mild ascites.     Past Medical History:   Diagnosis Date    Anticoagulant long-term use     Arthritis     Breast cancer 2014    invasive lobular carcinoma    Cancer of kidney 11/2020    RIGHT KIDNEY CANCER    CHF (congestive heart failure)     Coronary artery disease dx 2005    Depression     Diabetes mellitus     Diastolic heart failure secondary to hypertension     Gout     Hyperlipemia     Hypertension     Hypertrophy of nasal turbinates     Kidney mass 2020    Right    Levoscoliosis     Lung nodule     left    Multiple thyroid nodules     NICOLE (obstructive sleep apnea)     uses C-PAP    Pulmonary hypertension     Severe sepsis 07/01/2024       Past Surgical History:   Procedure Laterality Date    AORTOGRAPHY N/A 12/04/2020    Procedure: Aortogram;  Surgeon: Paul Pedersen MD;  Location: [facility] CATH;  Service: Cardiology;  Laterality: N/A;    BREAST SURGERY      BRONCHOSCOPY N/A 7/15/2024    Procedure: BRONCHOSCOPY, FLEXIBLE;  Surgeon: Tanya Beckham MD;  Location: [address];  Service: General;  Laterality: N/A;    CARDIAC CATHETERIZATION  12/2020    CHOLECYSTECTOMY      COLONOSCOPY      multi -last 2014     CORONARY ARTERY BYPASS GRAFT      ESOPHAGOGASTRODUODENOSCOPY      2012     HAND SURGERY Right     INSERTION OF CATHETER N/A 6/23/2024    Procedure: Insertion,catheter;  Surgeon: Meli Griffin MD;  Location: [facility] OR;  Service: Vascular;  Laterality: N/A;  ORIGINAL CATHETER WAS REPOSITIONED.  NO NEW CATHETER WAS PLACED    INSERTION OF TUNNELED CENTRAL VENOUS HEMODIALYSIS CATHETER Right 7/11/2024    Procedure: Insertion, Catheter, Central Venous, Hemodialysis;  Surgeon: Hawa Landa MD;  Location: [facility] CATH LAB;  Service: Interventional Nephrology;  Laterality: Right;    INSERTION, CATHETER, DIALYSIS, PERITONEAL N/A 6/4/2024    Procedure: IN Insertion Catheter, Dialysis, Peritoneal - laparoscopic;  Surgeon: Hossein  "Rodriguez BURROWS Jr., MD;  Location: [facility] OR;  Service: General;  Laterality: N/A;    LAPAROSCOPIC ROBOT-ASSISTED SURGICAL REMOVAL OF KIDNEY USING DA CHELLE XI Right 03/10/2022    Procedure: XI ROBOTIC NEPHRECTOMY- radical;  Surgeon: Rolando Ramirez MD;  Location: [facility] OR;  Service: Urology;  Laterality: Right;    MASTECTOMY W/ SENTINEL NODE BIOPSY Bilateral 01/21/2015    bilateral "dog ears"    NASAL SINUS SURGERY  2015    Dr Bryant FESS/cauterization turbinate     PARTIAL HYSTERECTOMY  1989    PERCUTANEOUS TRANSLUMINAL BALLOON ANGIOPLASTY OF CORONARY ARTERY  12/04/2020    Procedure: Angioplasty-coronary;  Surgeon: Paul Pedersen MD;  Location: [facility] CATH;  Service: Cardiology;;    PERITONEAL CATHETER REMOVAL N/A 7/15/2024    Procedure: REMOVAL, CATHETER, DIALYSIS, PERITONEAL;  Surgeon: Tanya Beckham MD;  [facility];  Service: General;  Laterality: N/A;    REMOVAL OF HEMODIALYSIS CATHETER  7/11/2024    Procedure: REMOVAL, CATHETER, HEMODIALYSIS;  Surgeon: Hawa Landa MD;  Location: [facility] CATH LAB;  Service: Interventional Nephrology;;    RENAL BIOPSY Right     9/20/2021 EJ    TUBAL LIGATION      ULTRASOUND GUIDANCE  12/04/2020    Procedure: Ultrasound Guidance;  Surgeon: Paul Pedersen MD;  Location: [facility] CATH;  Service: Cardiology;;       Review of patient's allergies indicates:   Allergen Reactions    Percocet [oxycodone-acetaminophen] Itching    Januvia [sitagliptin]     Jardiance [empagliflozin]      Leg cramps    Lipitor [atorvastatin] Other (See Comments)     Severe leg pain    Linaclotide Other (See Comments) and Nausea And Vomiting     Does not remember    Lubiprostone Other (See Comments) and Palpitations     Does not remember     Family History       Problem Relation (Age of Onset)    Asthma Daughter    Birth defects Daughter    Breast cancer Mother, Maternal Aunt    Depression Daughter    Drug abuse Daughter, Daughter    Glaucoma Sister    Hepatitis Brother    Hypertension Father    " Learning disabilities Daughter    Mental illness Daughter    Stroke Father          Tobacco Use    Smoking status: Former     Current packs/day: 0.00     Types: Cigarettes     Start date: 2016     Quit date: 2016     Years since quittin.5    Smokeless tobacco: Never    Tobacco comments:     quit    Substance and Sexual Activity    Alcohol use: No    Drug use: No    Sexual activity: Not Currently     Partners: Male     Birth control/protection: None     Review of Systems   Constitutional:  Negative for chills and fever.   Respiratory:  Negative for shortness of breath.    Cardiovascular:  Negative for chest pain.   Gastrointestinal:  Positive for abdominal distention, abdominal pain and diarrhea. Negative for blood in stool, nausea and vomiting.   Endocrine: Negative for heat intolerance.   Genitourinary:         Anuria      Skin:  Negative for pallor.   Neurological:  Negative for dizziness and headaches.     Objective:     Vital Signs (Most Recent):  Temp: 98.4 °F (36.9 °C) (24)  Pulse: 107 (24)  Resp: 18 (24)  BP: (!) 107/56 (24)  SpO2: 97 % (24) Vital Signs (24h Range):  Temp:  [98.4 °F (36.9 °C)-99.5 °F (37.5 °C)] 98.4 °F (36.9 °C)  Pulse:  [] 107  Resp:  [18-20] 18  SpO2:  [93 %-100 %] 97 %  BP: (107-140)/(55-63) 107/56     Weight: 128.5 kg (283 lb 4.7 oz) (24 1247)  Body mass index is 44.37 kg/m².      Intake/Output Summary (Last 24 hours) at 2024 0815  Last data filed at 2024 1800  Gross per 24 hour   Intake 206.67 ml   Output --   Net 206.67 ml       Lines/Drains/Airways       Central Venous Catheter Line  Duration                  Hemodialysis Catheter 24 1219 left subclavian 17 days    Trialysis (Dialysis) Catheter 24 1120 left femoral 8 days              Peripheral Intravenous Line  Duration                  Peripheral IV - Single Lumen 24 1931 22 G Left Antecubital <1 day                      Physical Exam  Constitutional:       General: She is not in acute distress.     Appearance: She is obese.   HENT:      Head: Normocephalic and atraumatic.      Mouth/Throat:      Mouth: Mucous membranes are dry.      Pharynx: Oropharynx is clear.   Eyes:      General: No scleral icterus.  Cardiovascular:      Rate and Rhythm: Tachycardia present.      Heart sounds: Normal heart sounds.   Pulmonary:      Effort: Pulmonary effort is normal. No respiratory distress.   Abdominal:      General: There is no distension.      Palpations: Abdomen is soft.      Tenderness: There is abdominal tenderness (LLQ at former PD site). There is no guarding or rebound.   Musculoskeletal:         General: No tenderness.   Skin:     General: Skin is warm and dry.   Neurological:      General: No focal deficit present.      Mental Status: She is alert and oriented to person, place, and time.   Psychiatric:         Mood and Affect: Mood normal.          Significant Labs:  BMP:   Recent Labs   Lab 07/29/24  0419   *      K 4.0      CO2 15*   BUN 47*   CREATININE 6.3*   CALCIUM 8.9   MG 2.1     CMP:   Recent Labs   Lab 07/29/24  0419   *   CALCIUM 8.9   ALBUMIN 2.0*      K 4.0   CO2 15*      BUN 47*   CREATININE 6.3*       Significant Imaging:  Imaging results within the past 24 hours have been reviewed.  Assessment/Plan:     ID  Clostridium difficile infection  74 year old female with a complicated pmh significant for ESRD on HD, RCC, obesity, CAD s/p PCI, HFrEF, afib, and PEA arrest for whom GI was consulted for C diff that was not responding to vancomycin and possible fecal transplant. She tested positive for C diff on 7/22 and per patient and chart review this was the first time she has had this infection. She was started on vancomycin at that time and despite this her diarrhea persisted and her WBC continued to rise. ID was consulted and switched to fidaxomicin on 7/28 and added  cholestyramine. She cannot quantify the number of stools she is having secondary to rectal tube placement, but thinks thinks that since her medications were changed yesterday that the stools have decreased. She has some LLQ pain but I suspect this is secondary to her recently having a PD catheter in that location, but unable to say with certainty. She denies any fever, chills, and the cramping has resolved.    - Continue Fidaxomicin for now and continue to monitor   - No fecal transplant at this time  - No inpatient colonoscopy at this time                   Thank you for your consult.     Irwin Steele, DO  Gastroenterology  [facility] - Stepdown [facility] ([place])

## 2024-07-29 NOTE — CONSULTS
Inpatient consult to Physical Medicine Rehab  Consult performed by: Liza Jeffries NP  Consult ordered by: Bayron Roy MD  Reason for consult: Rehab      Consult received.     TAI Jorge, FNP-C  Physical Medicine & Rehabilitation   07/29/2024

## 2024-07-29 NOTE — SUBJECTIVE & OBJECTIVE
Interval History: No acute events. AFebrile but leukocytosis  Pt denies new complaints  Getting HD today    Review of Systems  Objective:     Vital Signs (Most Recent):  Temp: 99 °F (37.2 °C) (07/29/24 1133)  Pulse: (!) 113 (07/29/24 1512)  Resp: 18 (07/29/24 1338)  BP: (!) 109/54 (07/29/24 1446)  SpO2: 96 % (07/29/24 1133) Vital Signs (24h Range):  Temp:  [98.3 °F (36.8 °C)-99.3 °F (37.4 °C)] 99 °F (37.2 °C)  Pulse:  [] 113  Resp:  [18-20] 18  SpO2:  [93 %-97 %] 96 %  BP: ()/(54-62) 109/54     Weight: 128.5 kg (283 lb 4.7 oz)  Body mass index is 44.37 kg/m².    Intake/Output Summary (Last 24 hours) at 7/29/2024 1550  Last data filed at 7/28/2024 1800  Gross per 24 hour   Intake 206.67 ml   Output --   Net 206.67 ml      Physical Exam  Vitals and nursing note reviewed.   Constitutional:       General: She is not in acute distress.     Appearance: She is not ill-appearing, toxic-appearing or diaphoretic.   HENT:      Head: Normocephalic and atraumatic.      Right Ear: External ear normal.      Left Ear: External ear normal.      Nose: Nose normal.   Cardiovascular:      Rate and Rhythm: Tachycardia present. Rhythm irregular.      Pulses: Normal pulses.      Heart sounds: Normal heart sounds. No murmur heard.     No friction rub. No gallop.   Pulmonary:      Effort: Pulmonary effort is normal. No respiratory distress.      Breath sounds: Normal breath sounds. No wheezing, rhonchi or rales.   Abdominal:      General: Abdomen is flat. Bowel sounds are normal. There is no distension.      Palpations: Abdomen is soft.      Tenderness: There is no abdominal tenderness. There is no guarding or rebound.   Musculoskeletal:         General: Swelling present. No tenderness. Normal range of motion.      Right lower leg: Edema present.      Left lower leg: Edema present.   Skin:     General: Skin is warm and dry.      Coloration: Skin is not jaundiced or pale.   Neurological:      General: No focal deficit present.     "  Mental Status: She is alert and oriented to person, place, and time. Mental status is at baseline.   Psychiatric:         Mood and Affect: Mood normal.         Behavior: Behavior normal.         MELD 3.0: 20 at 7/23/2024  2:01 PM  MELD-Na: 21 at 7/23/2024  2:01 PM  Calculated from:  Serum Creatinine: On dialysis. Using the maximum value.  Serum Sodium: 140 mmol/L (Using max of 137 mmol/L) at 7/23/2024  2:01 PM  Total Bilirubin: 0.8 mg/dL (Using min of 1 mg/dL) at 7/23/2024  4:02 AM  Serum Albumin: 1.8 g/dL at 7/23/2024  2:01 PM  INR(ratio): 1.1 at 7/21/2024  1:08 PM  Age at listing (hypothetical): 74 years  Sex: Female at 7/23/2024  2:01 PM      Significant Labs:  CBC:  Recent Labs   Lab 07/28/24  0636 07/29/24  0419   WBC 16.63* 17.92*   HGB 7.5* 7.3*   HCT 24.7* 23.3*   * 140*     CMP:  Recent Labs   Lab 07/28/24  0636 07/29/24  0419    136   K 4.2 4.0    109   CO2 16* 15*   * 141*   BUN 43* 47*   CREATININE 5.7* 6.3*   CALCIUM 9.6 8.9   ALBUMIN 2.2* 2.0*   ANIONGAP 13 12     PTINR:  No results for input(s): "INR" in the last 48 hours.    Significant Procedures:   Dobutamine Stress Test with Color Flow: No results found for this or any previous visit.      "

## 2024-07-29 NOTE — ASSESSMENT & PLAN NOTE
74-year-old female with multiple comorbidities transferred from [facility] for higher level of care.  She is on dialysis and had recent antibiotics due to, what she reports as a superficial skin infection.  She reports having diarrhea for 3 weeks but it has worsened recently.  C diff testing on 07/21 was positive and she was started on p.o. vancomycin appropriately.  Her diarrhea has failed to resolve.  She has had ongoing leukocytosis which is not resolving.  This is concerning for complicated C diff. CT 7/28 showed colitis.  Rectal tube placed. Vancomycin was switched to Fidaxomycin 7/28. She is afebrile and appears stable.     Plan:  Continue Fidaxomicin  Agree with GI consult as patient may need fecal transplant in the future  Monitor symptoms closely  Discussed with ID staff, we will follow

## 2024-07-29 NOTE — ASSESSMENT & PLAN NOTE
Echo    Left Ventricle: The left ventricle is moderately dilated. Normal wall thickness. Global hypokinesis present. There is severely reduced systolic function with a visually estimated ejection fraction of 20 - 25%.    Right Ventricle: Mild right ventricular enlargement. Wall thickness is normal. Right ventricle wall motion has global hypokinesis. Systolic function is moderately reduced.    Left Atrium: Left atrium is severely dilated.    Right Atrium: Right atrium is severely dilated.    Aortic Valve: The aortic valve is a trileaflet valve. There is mild aortic valve sclerosis.    Mitral Valve: There is mild regurgitation.    Tricuspid Valve: There is severe regurgitation.    Pulmonary Artery: There is moderate pulmonary hypertension. The estimated pulmonary artery systolic pressure is 61 mmHg.    IVC/SVC: Elevated venous pressure at 15 mmHg.    Pericardium: There is a trivial circumferential effusion. No indication of cardiac tamponade.     - Will require interventional cardiology for LHC vs PET for reduced EF once euvolemic - deferring given pt's illness and co morbidities, infection  - LifeVest prior to discharge  - Continue Entresto 24-26mg BID per cardiology recs

## 2024-07-29 NOTE — SUBJECTIVE & OBJECTIVE
Interval History: NAEON, getting iHD today with 0 UF, still having diarrheal episodes.    Review of patient's allergies indicates:   Allergen Reactions    Percocet [oxycodone-acetaminophen] Itching    Januvia [sitagliptin]     Jardiance [empagliflozin]      Leg cramps    Lipitor [atorvastatin] Other (See Comments)     Severe leg pain    Linaclotide Other (See Comments) and Nausea And Vomiting     Does not remember    Lubiprostone Other (See Comments) and Palpitations     Does not remember     Current Facility-Administered Medications   Medication Frequency    0.9%  NaCl infusion (for blood administration) Q24H PRN    acetaminophen tablet 650 mg Q6H PRN    cholestyramine 4 gram packet 4 g BID    dextromethorphan-guaiFENesin  mg/5 ml liquid 5 mL Q4H PRN    dextrose 10% bolus 125 mL 125 mL PRN    dextrose 10% bolus 250 mL 250 mL PRN    diphenhydrAMINE capsule 25 mg Q6H PRN    famotidine tablet 20 mg Daily    ferric gluconate (FERRLECIT) 125 mg in 0.9% NaCl 100 mL IVPB Daily    [START ON 7/30/2024] ferrous sulfate tablet 1 each Daily    fidaxomicin tablet 200 mg BID    glucagon (human recombinant) injection 1 mg PRN    glucose chewable tablet 16 g PRN    glucose chewable tablet 24 g PRN    heparin 25,000 units in dextrose 5% (100 units/ml) IV bolus from bag LOW INTENSITY nomogram - [facility] PRN    heparin 25,000 units in dextrose 5% (100 units/ml) IV bolus from bag LOW INTENSITY nomogram - [facility] PRN    heparin 25,000 units in dextrose 5% 250 mL (100 units/mL) infusion LOW INTENSITY nomogram - [facility] Continuous    insulin aspart U-100 pen 0-10 Units QID (AC + HS) PRN    LIDOcaine 5 % patch 2 patch Q24H    metoprolol injection 5 mg Q6H PRN    metoprolol tartrate (LOPRESSOR) tablet 50 mg BID    miconazole NITRATE 2 % top powder BID    midodrine tablet 10 mg Daily PRN    naloxone 0.4 mg/mL injection 0.02 mg PRN    ondansetron injection 4 mg Q6H PRN    sacubitriL-valsartan 24-26 mg per tablet 1 tablet BID    simethicone chewable  tablet 80 mg TID PRN    sodium chloride 0.9% flush 10 mL PRN    traMADoL tablet 50 mg Q8H PRN       Objective:     Vital Signs (Most Recent):  Temp: 99 °F (37.2 °C) (07/29/24 1133)  Pulse: (!) 50 (07/29/24 1133)  Resp: 20 (07/29/24 1133)  BP: 100/60 (07/29/24 1133)  SpO2: 96 % (07/29/24 1133) Vital Signs (24h Range):  Temp:  [98.3 °F (36.8 °C)-99.3 °F (37.4 °C)] 99 °F (37.2 °C)  Pulse:  [] 50  Resp:  [18-20] 20  SpO2:  [93 %-98 %] 96 %  BP: ()/(54-63) 100/60     Weight: 128.5 kg (283 lb 4.7 oz) (07/24/24 1247)  Body mass index is 44.37 kg/m².  Body surface area is 2.46 meters squared.    I/O last 3 completed shifts:  In: 326.7 [P.O.:120; IV Piggyback:206.7]  Out: -      Physical Exam  Constitutional:       Appearance: Normal appearance. She is obese.   Cardiovascular:      Rate and Rhythm: Normal rate.      Pulses: Normal pulses.   Pulmonary:      Effort: Pulmonary effort is normal. No respiratory distress.      Breath sounds: Rhonchi present. No wheezing.   Musculoskeletal:      Right lower leg: No edema.      Left lower leg: No edema.   Skin:     General: Skin is warm.   Neurological:      Mental Status: She is alert and oriented to person, place, and time.          Significant Labs:  BMP:   Recent Labs   Lab 07/29/24 0419   *      K 4.0      CO2 15*   BUN 47*   CREATININE 6.3*   CALCIUM 8.9   MG 2.1     CBC:   Recent Labs   Lab 07/29/24 0419   WBC 17.92*   RBC 2.73*   HGB 7.3*   HCT 23.3*   *   MCV 85   MCH 26.7*   MCHC 31.3*        Significant Imaging:  Labs: Reviewed  ECG: Reviewed  X-Ray: Reviewed  US: Reviewed  CT: Reviewed

## 2024-07-29 NOTE — PLAN OF CARE
Problem: Adult Inpatient Plan of Care  Goal: Plan of Care Review  Outcome: Progressing  Flowsheets (Taken 7/28/2024 1939)  Plan of Care Reviewed With: patient  Goal: Patient-Specific Goal (Individualized)  Outcome: Progressing  Goal: Absence of Hospital-Acquired Illness or Injury  Outcome: Progressing  Intervention: Identify and Manage Fall Risk  Flowsheets (Taken 7/28/2024 1939)  Safety Promotion/Fall Prevention: assistive device/personal item within reach  Intervention: Prevent Skin Injury  Flowsheets (Taken 7/28/2024 1939)  Body Position: weight shifting  Skin Protection: incontinence pads utilized  Device Skin Pressure Protection:   absorbent pad utilized/changed   tubing/devices free from skin contact  Intervention: Prevent and Manage VTE (Venous Thromboembolism) Risk  Flowsheets (Taken 7/28/2024 1939)  VTE Prevention/Management: bleeding precations maintained  Intervention: Prevent Infection  Flowsheets (Taken 7/28/2024 1939)  Infection Prevention: single patient room provided

## 2024-07-29 NOTE — HPI
74 year old female with Hx of Diabetes, CAD s/p PCI to LCx and LAD (11/202), HFrEF, RCC s/p R. Nephrectomy and CKD  stage 5 with recent PD catheter placement on 6/4 who was transferred from [facility] for higher level of care. Patient initially presented on 6/18 to [facility] for volume overload in the setting of renal failure. She was started on Lasix gtt without improvement. Tunneled line was placed for HD and during her second session she suffered a PEA arrest s/p intubation. MICU course complicated by AF with RVR, sepsis in the setting of line infection and bleeding. She was placed on diltiazem gtt for AF with low EF due to allergy to amiodarone. Patient was admitted to MICU on 7/8 here at [facility] and dialysis was re-initiated after she had previously refused and became encephalopathic. Palliative care was consulted. Patient was made DNR. She developed diarrhea and was found to have C.Difficile. Rectal tube placed and she was started on Vanc. Patient was seen this morning denies any complaints at this time. Patient denies any chest pain/pressure or discomfort, palpitations, shortness of breath, orthopnea, PND or lower extremity edema.Daugther at bedside

## 2024-07-30 NOTE — PROGRESS NOTES
[facility]  Infectious Disease  Progress Note    Patient Name: Juhi Taylor  MRN: [id number]  Admission Date: 7/3/2024  Length of Stay: 27 days  Attending Physician: Bayron Roy*  Primary Care Provider: Tony Sadler MD    Isolation Status: Special Contact  Assessment/Plan:      ID  * Clostridium difficile infection  74-year-old female with multiple comorbidities transferred from [facility] for higher level of care. She is on dialysis and had recent antibiotics due to, what she reports as a superficial skin infection.  She reports having diarrhea for 3 weeks but it has worsened recently.  C diff testing on 07/21 was positive and she was started on p.o. vancomycin appropriately.  Her diarrhea has failed to resolve.  She has had ongoing leukocytosis which is not resolving.  CT 7/28 showed colitis.  Rectal tube placed. Vancomycin was switched to Fidaxomycin 7/28. She is afebrile and appears stable. Reports diarrhea improved.    Plan:  Continue Fidaxomicin 200 mg PO BID x 14 days in face of lack of response to oral Vancomycin  GI has evaluated the patient. No indication for FMT at this time   Continue management per primary team  Discussed with ID staff. ID will sign off. Feel free to re-consult ID with any concerns.              Thank you for your consult. I will sign off. Please contact us if you have any additional questions.    Amelia Traylor PA-C  Infectious Disease  [facility]    Subjective:     Principal Problem:Clostridium difficile infection    HPI: Patient is a 74-year-old female with a past medical history of CKD stage 5, RCC status post right nephrectomy, type 2 diabetes, obesity, CAD, HF, PD catheter placement on 6/4.  She was transferred from [facility] after she presented there on 06/18/24 as for catheter site was leaking and she was fluid overloaded.  Her hospital course was complicated by PEA arrest and  resuscitation.  She was found to have elevated troponins.  It was also complicated by dialysis line infection which she reports was superficial.  Patient was transferred to [facility] for higher level of care.  She has had diarrhea and C diff testing was positive on 07/21/2024 and she was started on oral vancomycin 125 mg p.o. q.6 H. she has continued to have diarrhea so ID was consulted.    Patient chart review shows ongoing leukocytosis with currently at 16.  Patient seen and reports ongoing diarrhea in the rectal tube.  She has pain at her PD cath site but denies any diffuse abdominal pain.  Prior blood and stool cultures are negative.  She does not feel well but denies any fever, chills, sweating.  She clarifies that the diarrhea has been ongoing for three or so weeks but has worsened recently.  Interval History: Diarrhea improving with fidaxomycin. No new complaints. Feels better. Afebrile.    Review of Systems   Constitutional:  Negative for chills, diaphoresis and fever.   Respiratory:  Negative for cough, chest tightness and shortness of breath.    Cardiovascular:  Negative for chest pain and leg swelling.   Gastrointestinal:  Positive for abdominal pain and diarrhea. Negative for constipation, nausea and vomiting.   Musculoskeletal:  Negative for back pain.   Skin:  Negative for color change and rash.   Neurological:  Negative for headaches.   Psychiatric/Behavioral:  Negative for agitation and confusion. The patient is not nervous/anxious.      Objective:     Vital Signs (Most Recent):  Temp: 97.7 °F (36.5 °C) (07/30/24 1137)  Pulse: (!) 117 (07/30/24 1137)  Resp: 20 (07/30/24 1137)  BP: 127/63 (07/30/24 1137)  SpO2: 99 % (07/30/24 1137) Vital Signs (24h Range):  Temp:  [97.7 °F (36.5 °C)-98.8 °F (37.1 °C)] 97.7 °F (36.5 °C)  Pulse:  [] 117  Resp:  [18-20] 20  SpO2:  [96 %-99 %] 99 %  BP: ()/(50-74) 127/63     Weight: 128.5 kg (283 lb 4.7 oz)  Body mass index is 44.37 kg/m².    Estimated Creatinine  Clearance: 11.3 mL/min (A) (based on SCr of 6.3 mg/dL (H)).     Physical Exam  Constitutional:       General: She is not in acute distress.     Appearance: Normal appearance. She is well-developed. She is not ill-appearing or diaphoretic.   HENT:      Head: Normocephalic and atraumatic.      Right Ear: External ear normal.      Left Ear: External ear normal.      Nose: Nose normal.   Eyes:      General: No scleral icterus.        Right eye: No discharge.         Left eye: No discharge.      Extraocular Movements: Extraocular movements intact.      Conjunctiva/sclera: Conjunctivae normal.   Cardiovascular:      Rate and Rhythm: Tachycardia present.   Pulmonary:      Effort: Pulmonary effort is normal. No respiratory distress.      Breath sounds: No stridor.   Abdominal:      Palpations: Abdomen is soft.      Tenderness: There is abdominal tenderness.      Comments: Former PD site c/d/i   Genitourinary:     Comments: Rectal tube  Musculoskeletal:         General: Normal range of motion.   Skin:     General: Skin is dry.      Findings: No erythema or rash.   Neurological:      General: No focal deficit present.      Mental Status: She is alert and oriented to person, place, and time. Mental status is at baseline.      Cranial Nerves: No cranial nerve deficit.   Psychiatric:         Mood and Affect: Mood normal.         Behavior: Behavior normal.         Thought Content: Thought content normal.         Judgment: Judgment normal.          Significant Labs: Blood Culture:   Recent Labs   Lab 06/29/24  1607 07/17/24  1737 07/17/24  1755 07/29/24  1345 07/29/24  1346   LABBLOO No growth after 5 days.  No growth after 5 days. No growth after 5 days. No growth after 5 days. No Growth to date No Growth to date     CBC:   Recent Labs   Lab 07/29/24 0419 07/30/24  0138   WBC 17.92* 19.15*   HGB 7.3* 7.4*   HCT 23.3* 23.6*   * 158     CMP:   Recent Labs   Lab 07/29/24 0419 07/30/24  0138    136   K 4.0 3.9   CL  109 108   CO2 15* 16*   * 110   BUN 47* 49*   CREATININE 6.3* 6.3*   CALCIUM 8.9 8.9   ALBUMIN 2.0* 1.9*   ANIONGAP 12 12     Microbiology Results (last 7 days)       Procedure Component Value Units Date/Time    Fungus culture [[id number]] Collected: 07/10/24 1630    Order Status: Completed Specimen: Body Fluid from Peritoneal Fluid Updated: 07/30/24 1114     Fungus (Mycology) Culture Culture in progress      No fungus isolated after 2 weeks    Blood culture [[id number]] Collected: 07/29/24 1345    Order Status: Completed Specimen: Blood Updated: 07/29/24 2115     Blood Culture, Routine No Growth to date    Blood culture [[id number]] Collected: 07/29/24 1346    Order Status: Completed Specimen: Blood Updated: 07/29/24 2115     Blood Culture, Routine No Growth to date          Recent Lab Results  (Last 5 results in the past 24 hours)        07/30/24  1133   07/30/24  0828   07/30/24  0358   07/30/24  0138   07/29/24  2022        Albumin       1.9         Anion Gap       12         Aniso       Moderate         PTT     40.7  Comment: Refer to local heparin nomogram for intensity/dose specific   therapeutic   range.     96.8  Comment: Refer to local heparin nomogram for intensity/dose specific   therapeutic   range.                  88.1  Comment: Refer to local heparin nomogram for intensity/dose specific   therapeutic   range.           Baso #       0.08         Basophil %       0.4         BUN       49         Calcium       8.9         Chloride       108         CO2       16         Creatinine       6.3         CRP       313.6         Differential Method       Automated         eGFR       6.6         Eos #       0.1         Eos %       0.5         Glucose       110         Gran # (ANC)       16.1         Gran %       84.0         Hematocrit       23.6         Hemoglobin       7.4         Hypo       Occasional         Immature Grans (Abs)       0.34  Comment: Mild elevation in immature granulocytes is non  specific and   can be seen in a variety of conditions including stress response,   acute inflammation, trauma and pregnancy. Correlation with other   laboratory and clinical findings is essential.           Immature Granulocytes       1.8         Lymph #       1.6         Lymph %       8.1         Magnesium        2.1         MCH       26.5         MCHC       31.4         MCV       85         Mono #       1.0         Mono %       5.2         MPV       10.0         nRBC       0         Ovalocytes       Occasional         Phosphorus Level       4.8                4.8         Platelet Estimate       Appears normal         Platelet Count       158         POCT Glucose 99   139       118       Poikilocytosis       Slight         Poly       Occasional         Potassium       3.9         RBC       2.79         RDW       22.2         Sodium       136         Spherocytes       Occasional         Target Cells       Occasional         WBC       19.15                                Significant Imaging:       CT Abdomen Pelvis Without Contrast [[id number]] (Abnormal) Resulted: 07/28/24 2121   Order Status: Completed Updated: 07/28/24 2123   Narrative:     EXAMINATION:  CT ABDOMEN PELVIS WITHOUT CONTRAST    CLINICAL HISTORY:  c diff, eval for toxic megacolon;    TECHNIQUE:  Low dose axial images, sagittal and coronal reformations were obtained from the lung bases to the pubic symphysis, Oral contrast was not administered.    COMPARISON:  08/05/2021    FINDINGS:  Heart: The heart is enlarged.  Prominent coronary atherosclerosis.    Lung Bases: Small basilar pleural effusions with mild associated atelectasis or consolidation.    Liver: The liver is enlarged.  No focal abnormality.  Limited characterization on this noncontrast study.    Gallbladder: Status post cholecystectomy.    Bile Ducts: No evidence of dilated ducts.    Pancreas: No mass or peripancreatic fat stranding.    Spleen: Unremarkable.    Adrenals:  Unremarkable.    Kidneys/ Ureters: Status post right nephrectomy.  Stable probable tiny subcentimeter cyst with minimal wall calcification on the left.  Nonobstructing nephrolithiasis of the left kidney in the upper pole.  No hydronephrosis or hydroureter on the left.    Bladder: No evidence of wall thickening.    Reproductive organs: Status post hysterectomy.    GI Tract/Mesentery: There is prominent wall thickening noted to the proximal transverse colon on the right.  Wall thickening is less prominent in the distal transverse colon and descending colon.  Findings could be associated with colitis.  An underlying mass in the proximal transverse colon could not be excluded.  Colonoscopy follow-up post treatment recommended.  The colon is not significantly dilated.    There is a rectal tube and distal balloon present.    Peritoneal Space: There is mild ascites in the abdomen and pelvis.  No free air.    Retroperitoneum: No significant adenopathy.    Abdominal wall: There is moderate edema in the subcutaneous fat in the flank region bilaterally.  Minimal fluid in the subcutaneous fat in the lower fat pannus on the left may be associated with injections    Vasculature: No significant atherosclerosis or aneurysm.    Bones: No acute fracture.    Degenerative disc changes in the lumbar spine.   Impression:       1. Prominent wall thickening of the transverse colon and descending colon most prominent at the proximal transverse colon.  The findings could represent colitis.  An underlying mass in the proximal transverse colon cannot be excluded.  See above comments.  Follow-up is recommended.  2. Hepatomegaly with mild ascites in the abdomen and pelvis.  3. Cardiomegaly with prominent coronary atherosclerosis.  4. Small bibasilar pleural effusions with mild associated atelectasis or consolidation.  5. Status post right nephrectomy.  Probable small subcentimeter minimally complex left renal cyst with minimal wall  calcification.  No significant change.  6. See above comments also.  7. This report was flagged in Epic as abnormal.      Electronically signed by: Russell Felix  Date: 07/28/2024  Time: 21:21

## 2024-07-30 NOTE — PROGRESS NOTES
[facility]  Physical Therapy/Lymphedema Initial Evaluation    Name: Daniela Costa  Clinic Number: [id number]    Therapy Diagnosis:   Encounter Diagnoses   Name Primary?    Lymphedema     Impaired range of motion of left shoulder       Physician: Anna Main, NP    Physician Orders: PT Eval and Treat   Medical Diagnosis from Referral: [email or web address]   Evaluation Date: 12/2/2022  Authorization Period Expiration: 12/30/2022  Plan of Care Expiration: 2/10/2023  Visit # / Visits authorized: 1/ 1    Time In: 11:43 am  Time Out: 11:53 am  Total Billable Time: 10 minutes    Precautions: Standard    Subjective   Date of onset: 2013  History of current condition - Daniela reports: coming therapy today for preventative management of lymphedema right arm. She had bilateral mastectomy and radiation and removal of lymph nodes right side only. She feels like her right arm is somewhat heavier and her right shoulder is tight because she never attended therapy after mastectomy. She has a compression sleeve at home but it is too tight due to recent weight gain; her chief complaint is impaired right shoulder range of motion       Medical History:   Past Medical History:   Diagnosis Date    Allergy     Anxiety     Arthritis     Asthma     Breast cancer     june 2012; BRCA 1 and 2 negative    Breast cancer genetic susceptibility     Cancer     Depression     Hypothyroidism     Lung disease     Pneumonia        Surgical History:   Daniela Costa  has a past surgical history that includes Tubal ligation; lumbar fusion; Cervical fusion; Breast surgery; Tonsillectomy; Adenoidectomy; Cervical fusion; SI joint fusion  (03/26/2018); Mastectomy (Right, 2013); Mastectomy (Left, 2015); Breast Reconstruction  (Bilateral); Back surgery; Robot-assisted laparoscopic abdominal hysterectomy using da Nadege Xi (N/A, 12/05/2018); Robot-assisted laparoscopic salpingo-oophorectomy using da Nadege Xi (Bilateral, 12/05/2018);  Per Lab Aptt drawn at 1130 a.m. was collected but not received in lab. Reordering stat.   Robot-assisted laparoscopic lysis of adhesions using da Nadege Xi (N/A, 12/05/2018); Hysterectomy; and Colonoscopy (N/A, 10/31/2022).    Medications:   Daniela has a current medication list which includes the following prescription(s): albuterol, ascorbic acid, blood pressure monitor, blood sugar diagnostic, blood-glucose meter, buspirone, clonazepam, diltiazem, famotidine, fluticasone furoate-vilanterol, fluticasone propionate, hydrocortisone, hydroxyzine, inhalation spacing device, lamotrigine, lancets, levothyroxine, loratadine, melatonin, meloxicam, ondansetron, and trazodone.    Allergies:   Review of patient's allergies indicates:   Allergen Reactions    Cefdinir      Radiation Recall, everywhere she had radiation it looked like blisters and very hot to touch    Levofloxacin      Went into deep depression. Messed with Pych meds    Sodium benzoate Hives    Hope Hives        Surgery: R and L mastectomy (right - 1/2013, left - 2017)  Radiation:  yes   Chemotherapy: yes   Hormonal Medications: no   Pt denies CHF, KF, and DM.  Previous Lymphedema Treatment: none  Social History:  lives with their family  Family Support:  Nutritional status: adequate   Educational needs: is knowledgeable of lymphedema, but not Manual Lymph Drainage    Fall risk: n0   Occupation: stay at home mom  Prior Level of Function: no impairments  Current Level of Function: no swelling; reports heaviness right upper extremity       Pain:  Current 0/10, worst 0/10, best 0/10       Pts goals: get a new compression sleeve and work on improving shoulder motion    Objective     STAGE 0 Secondary Lymphedema  Amount of Swelling: none  Location of Swelling: n/a   Skin Integrity: intact   Palpation/Texture: normal   Circulation: good   Posture: forward shoulders     Range of Motion - UE/LE  (R) 150 degrees shoulder flexion and abduction (GOAL: improve shoulder range of motion to 170 degrees  (L) 180 shoulder motion     Strength:   (R) 4+/5   (L)  "4+/5    Sensation: intact       Girth Measurements (in centimeters)    [facility] RIGHT UE  12/2/2022 LEFT UE  12/2/2022 DIFF  At eval   E + 8" 38 cm 38 cm - cm   E + 6" 36 cm 36 cm - cm   E + 4" 33 cm 33.5 cm - cm   E + 2" 32 cm 31.5 cm 0.5 cm   Elbow 27 cm 26.5 cm 0.5 cm   W+ 8" 27 cm 28 cm - cm   W +  6" 26 cm 27 cm - cm   W + 4" 22 cm 24 cm - cm   Wrist 17 cm 16.5 cm 0.5 cm   DPC 20 cm 19.5 cm - cm   IP Thumb 7 cm 6.5 cm 0.5 cm         CMS Impairment/Limitation/Restriction for FOTO - Survey    Therapist reviewed FOTO scores for Daniela Costa on 12/2/2022.   FOTO documents entered into [employer] - see Media section.    Limitation Score: -%  Consult for lymphedema only (needs work on shoulder motion due to mastectomy).        TREATMENT   Treatment Time In: 12:43 pm  Treatment Time Out: 12:53 pm  Total Treatment time separate from Evaluation: 10 minutes     Daniela received self care/home management to develop knowledge/understanding of lymphedema etiology, progression and treatment options x 10 minutes including:    -discussed etiology of lymphedema and the lymphatic system  -discussed cellulitis and ways to decrease risk of cellulitis  -Manual Lymph Drainage for right arm: link sent to phone   -recommendation made for compression sleeve and web sites provided  -provided two shoulder range of motion exercises for home program      Written Home Exercises Provided: yes.  Exercises were reviewed and Daniela was able to demonstrate them prior to the end of the session.  Daniela demonstrated good  understanding of the education provided.     See EMR under Patient Instructions for exercises provided 12/2/2022.    Assessment   Daniela is a 52 y.o. female referred to [facility] with a medical diagnosis of lymphedema. This patient presents s/p bilateral mastectomy   resulting in: lymphedema of the right arm (stage 0),  increased stiffness in the right shoulder due to mastectomy and reconstruction, as well as " difficulty performing overhead motions , and placing the pt at higher risk of infection.     Pt prognosis is Excellent.   Pt will benefit from skilled outpatient Physical Therapy to address the deficits stated above and in the chart below, provide pt/family education, and to maximize pt's level of independence.     Plan of care discussed with patient: Yes  Pt's spiritual, cultural and educational needs considered and patient is agreeable to the plan of care and goals as stated below:     Anticipated Barriers for therapy: none    Medical Necessity is demonstrated by the following  History  Co-morbidities and personal factors that may impact the plan of care Co-morbidities:   history of cancer    Personal Factors:   no deficits     low   Examination  Body Structures and Functions, activity limitations and participation restrictions that may impact the plan of care Body Regions:   upper extremities    Body Systems:    ROM    Participation Restrictions:   none    Activity limitations:   Learning and applying knowledge  no deficits    General Tasks and Commands  no deficits    Communication  no deficits    Mobility  Overhead motions    Self care  no deficits    Domestic Life  no deficits    Interactions/Relationships  no deficits    Life Areas  no deficits    Community and Social Life  no deficits         low   Clinical Presentation stable and uncomplicated low   Decision Making/ Complexity Score: low     The following goals were discussed with the patient and patient is in agreement with them as to be addressed in the treatment plan.     Short Term goals: 5 weeks  1. Patient will demonstrate 100% understanding of lymphedema risk reduction practices to include self monitoring for lymphedema. (progressing, not met)  2. Patient will demonstrate independence with Home Exercise program established. (progressing, not met)  3. Pt will increase AROM/PROM in shoulder abduction ROM to 170 degrees on right to improve functional  reach, carry, push, pull pain free. (progressing, not met)  4. Pt will increase AROM/PROM in shoulder flexion to 170 degrees on right to improve functional reach, carry, push, pull pain free.(progressing, not met)  5. Strength will be assessed and appropriate goals set. (progressing, not met)    Long Term Goals: 10 weeks   1. Pt will demonstrate full/maximized tissue mobility to increase ROM and promote healthy tissue to be pain free at discharge. (progressing, not met)        Plan   Plan of care Certification: 12/2/2022 to 2/10/2023    Outpatient Physical Therapy 2 times weekly for 10 weeks to include the following interventions: Manual Therapy, Self Care, and Therapeutic Exercise. Complete Decongestive Therapy- compression and home equipment needs to be addressed and assisted.    Aretha Pascual, PT, [facility]

## 2024-07-30 NOTE — SUBJECTIVE & OBJECTIVE
Interval History: HD done yesterday, 0UF, on 2 liters oxygen, planing for TDC placement on Thursday with anesthesia by Dr Landa.  Next HD tomorrow.    Review of patient's allergies indicates:   Allergen Reactions    Percocet [oxycodone-acetaminophen] Itching    Januvia [sitagliptin]     Jardiance [empagliflozin]      Leg cramps    Lipitor [atorvastatin] Other (See Comments)     Severe leg pain    Linaclotide Other (See Comments) and Nausea And Vomiting     Does not remember    Lubiprostone Other (See Comments) and Palpitations     Does not remember     Current Facility-Administered Medications   Medication Frequency    0.9%  NaCl infusion (for blood administration) Q24H PRN    acetaminophen tablet 650 mg Q6H PRN    cholestyramine 4 gram packet 4 g BID    dextromethorphan-guaiFENesin  mg/5 ml liquid 5 mL Q4H PRN    dextrose 10% bolus 125 mL 125 mL PRN    dextrose 10% bolus 250 mL 250 mL PRN    diphenhydrAMINE capsule 25 mg Q6H PRN    famotidine tablet 20 mg Daily    ferrous sulfate tablet 1 each Daily    fidaxomicin tablet 200 mg BID    glucagon (human recombinant) injection 1 mg PRN    glucose chewable tablet 16 g PRN    glucose chewable tablet 24 g PRN    heparin 25,000 units in dextrose 5% (100 units/ml) IV bolus from bag LOW INTENSITY nomogram - [facility] PRN    heparin 25,000 units in dextrose 5% (100 units/ml) IV bolus from bag LOW INTENSITY nomogram - [facility] PRN    heparin 25,000 units in dextrose 5% 250 mL (100 units/mL) infusion LOW INTENSITY nomogram - [facility] Continuous    hyoscyamine SL tablet 0.125 mg Q4H PRN    insulin aspart U-100 pen 0-10 Units QID (AC + HS) PRN    LIDOcaine 5 % patch 2 patch Q24H    metoprolol injection 5 mg Q6H PRN    metoprolol tartrate (LOPRESSOR) tablet 50 mg BID    miconazole NITRATE 2 % top powder BID    midodrine tablet 10 mg Daily PRN    naloxone 0.4 mg/mL injection 0.02 mg PRN    ondansetron injection 4 mg Q6H PRN    sacubitriL-valsartan 24-26 mg per tablet 1 tablet BID     simethicone chewable tablet 80 mg TID PRN    sodium chloride 0.9% bolus 250 mL 250 mL PRN    sodium chloride 0.9% flush 10 mL PRN    traMADoL tablet 50 mg Q8H PRN       Objective:     Vital Signs (Most Recent):  Temp: 97.7 °F (36.5 °C) (07/30/24 1137)  Pulse: (!) 117 (07/30/24 1137)  Resp: 20 (07/30/24 1137)  BP: 127/63 (07/30/24 1137)  SpO2: 99 % (07/30/24 1137) Vital Signs (24h Range):  Temp:  [97.7 °F (36.5 °C)-98.8 °F (37.1 °C)] 97.7 °F (36.5 °C)  Pulse:  [] 117  Resp:  [18-20] 20  SpO2:  [96 %-99 %] 99 %  BP: ()/(50-74) 127/63     Weight: 128.5 kg (283 lb 4.7 oz) (07/24/24 1247)  Body mass index is 44.37 kg/m².  Body surface area is 2.46 meters squared.    I/O last 3 completed shifts:  In: 3300 [I.V.:300; Other:3000]  Out: 1000 [Other:600; Stool:400]     Physical Exam  Constitutional:       General: She is not in acute distress.     Appearance: Normal appearance.   Cardiovascular:      Rate and Rhythm: Normal rate.      Pulses: Normal pulses.   Pulmonary:      Effort: Pulmonary effort is normal. No respiratory distress.      Breath sounds: No wheezing or rhonchi.   Musculoskeletal:      Right lower leg: No edema.      Left lower leg: No edema.   Skin:     General: Skin is warm.   Neurological:      General: No focal deficit present.      Mental Status: She is alert and oriented to person, place, and time.          Significant Labs:  BMP:   Recent Labs   Lab 07/30/24 0138         K 3.9      CO2 16*   BUN 49*   CREATININE 6.3*   CALCIUM 8.9   MG 2.1     CBC:   Recent Labs   Lab 07/30/24 0138   WBC 19.15*   RBC 2.79*   HGB 7.4*   HCT 23.6*      MCV 85   MCH 26.5*   MCHC 31.4*        Significant Imaging:  Labs: Reviewed  ECG: Reviewed  X-Ray: Reviewed  US: Reviewed  CT: Reviewed  MRI: Reviewed

## 2024-07-30 NOTE — PLAN OF CARE
Problem: Adult Inpatient Plan of Care  Goal: Plan of Care Review  Outcome: Progressing  Flowsheets (Taken 7/30/2024 1810)  Plan of Care Reviewed With:   patient   family  Goal: Patient-Specific Goal (Individualized)  Outcome: Progressing  Goal: Absence of Hospital-Acquired Illness or Injury  Outcome: Progressing  Intervention: Identify and Manage Fall Risk  Flowsheets (Taken 7/30/2024 1810)  Safety Promotion/Fall Prevention:   assistive device/personal item within reach   side rails raised x 2  Intervention: Prevent Skin Injury  Flowsheets (Taken 7/30/2024 1810)  Body Position: weight shifting  Skin Protection: incontinence pads utilized  Device Skin Pressure Protection: tubing/devices free from skin contact  Intervention: Prevent and Manage VTE (Venous Thromboembolism) Risk  Flowsheets (Taken 7/30/2024 1810)  VTE Prevention/Management: bleeding precations maintained  Intervention: Prevent Infection  Flowsheets (Taken 7/30/2024 1810)  Infection Prevention: single patient room provided  Goal: Optimal Comfort and Wellbeing  Outcome: Progressing  Intervention: Monitor Pain and Promote Comfort  Flowsheets (Taken 7/30/2024 1810)  Pain Management Interventions: pain management plan reviewed with patient/caregiver  Intervention: Provide Person-Centered Care  Flowsheets (Taken 7/30/2024 1810)  Trust Relationship/Rapport:   care explained   choices provided   thoughts/feelings acknowledged  Goal: Readiness for Transition of Care  Outcome: Progressing    Patient alert and oriented x4; care plan discussed with patient; free from falls/injuries during the shift; call light and personal belongings within reach; aptt not therapeutic, q6h checks ordered; blood sugar monitored throughout the shift; rectal tube still in place and draining; patient c/o oral pain, dukes solution ordered; patient able to express needs, no needs at this time; will continue with poc.

## 2024-07-30 NOTE — ASSESSMENT & PLAN NOTE
74-year-old female with multiple comorbidities transferred from [facility] for higher level of care. She is on dialysis and had recent antibiotics due to, what she reports as a superficial skin infection.  She reports having diarrhea for 3 weeks but it has worsened recently.  C diff testing on 07/21 was positive and she was started on p.o. vancomycin appropriately.  Her diarrhea has failed to resolve.  She has had ongoing leukocytosis which is not resolving.  CT 7/28 showed colitis.  Rectal tube placed. Vancomycin was switched to Fidaxomycin 7/28. She is afebrile and appears stable. Reports diarrhea improved.    Plan:  Continue Fidaxomicin 200 mg PO BID x 14 days in face of lack of response to oral Vancomycin  GI has evaluated the patient. No indication for FMT at this time   Continue management per primary team  Discussed with ID staff. ID will sign off. Feel free to re-consult ID with any concerns.

## 2024-07-30 NOTE — PT/OT/SLP PROGRESS
Occupational Therapy   Co-Treatment    Name: Juhi Taylor  MRN: [id number]  Admitting Diagnosis:  Clostridium difficile infection  15 Days Post-Op    Recommendations:     Discharge Recommendations: Moderate Intensity Therapy  Discharge Equipment Recommendations:  bedside commode, wheelchair, walker, rolling, bath bench, grab bar, hospital bed, lift device  Barriers to discharge:  Other (Comment) (increased skilled assist needed)    Assessment:     Juhi Taylor is a 74 y.o. female with a medical diagnosis of Clostridium difficile infection.  She presents with the following performance deficits affecting function: weakness, impaired endurance, impaired self care skills, impaired balance, impaired functional mobility, gait instability, decreased coordination, decreased upper extremity function, decreased lower extremity function, decreased safety awareness, pain, impaired cardiopulmonary response to activity, edema, impaired skin, decreased ROM. Pt agreeable to session. Pt presenting with increased anxiety with bed mobility secondary to increased pain in LLE at start of session. Pt demonstrating increased activity tolerance as compared to previous sessions as indicated by greater time sitting EOB achieved before fatiguing. Pt continuing to demonstrate generalized weakness, impaired activity tolerance, and decreased functional endurance which limits her optimal participation in ADL routines and OOB activities. Pt would continue to benefit from skilled OT services in the acute care setting to improve activity tolerance and functional endurance, promote increased functional use of BUEs, increase participation in self-care tasks and ADL routines, and to facilitate a return to PLOF and least restrictive home environment.     Rehab Prognosis:  Good; patient would benefit from acute skilled OT services to address these deficits and reach maximum level of function.       Plan:     Patient to be seen 4 x/week to  "address the above listed problems via self-care/home management, therapeutic activities, therapeutic exercises, neuromuscular re-education  Plan of Care Expires: 08/16/24  Plan of Care Reviewed with: patient, family    Subjective     Chief Complaint: pain in LLE  Patient/Family Comments/goals: "It feels like electricity."  Pain/Comfort:  Pain Rating 1: other (see comments) (pain not rated)  Location - Side 1: Left  Location - Orientation 1: generalized  Location 1: leg  Pain Addressed 1: Reposition, Distraction, Cessation of Activity    Objective:     Communicated with: Nursing prior to session.  Patient found supine with peripheral IV, bowel management system, telemetry, pulse ox (continuous), oxygen upon OT entry to room.    General Precautions: Standard, fall, special contact    Orthopedic Precautions:N/A  Braces: N/A  Respiratory Status: Nasal cannula, flow 2 L/min     Occupational Performance:     Bed Mobility:    Patient completed Scooting/Bridging with total assistance and 2 persons toward HOB  Patient completed Supine to Sit with total assistance and 2 persons  Patient completed Sit to Supine with total assistance and 2 persons     Functional Mobility/Transfers:  Functional Mobility: Pt able to tolerate sitting EOB for ~30 minutes, initially requiring total assist however progressing to mod-max assist with verbal and tactile cueing with some brief moments of CGA. Pt displaying slouched posturing, poor trunk control, and a R posterolateral lean. Pt requiring mod-max verbal and physical cueing to WB through BUE to compensate for poor trunk control; however, when cued to engage core, pt able to lean forward and sustain upright sitting. Pt declining attempt to stand this session secondary to fatigue.     Activities of Daily Living:  Grooming: stand by assistance for washing face with warm wash cloth in sitting EOB  Toileting: dependence - rectal tube      AMPAC 6 Click ADL: 13    Treatment & Education:  Pt " performed x5 L WB push-ups in sitting to promote lateral shift to midline EOB. Pt also performed x5 digit squeezes on B hands and x10 abdominal crunches. Attempt to perform BUE shoulder flexion - pt performed x3 repetitions with LUE before fatiguing and did not attempt with RUE.    Discussed the importance of continued mobilization and participation in OOB activities to increase functional endurance and activity tolerance for increased participation in ADL routines. Instructed pt to perform pursed lip breathing to decrease anxiety levels, increase O2 saturation, and regulate sympathetic nervous system. All questions within the scope of OT answered, and pt verbalized understanding.     Co-treatment performed due to patient's multiple deficits requiring two skilled therapists to appropriately and safely assess patient's strength, endurance, functional mobility, and ADL performance while facilitating functional tasks in addition to accommodating for patient's activity tolerance and medical acuity.    Patient left supine with all lines intact, call button in reach, and family present    GOALS:   Multidisciplinary Problems       Occupational Therapy Goals          Problem: Occupational Therapy    Goal Priority Disciplines Outcome Interventions   Occupational Therapy Goal     OT, PT/OT Progressing    Description: Goals to be met by: 08/04/24     Patient will increase functional independence with ADLs by performing:    UE Dressing with Modified [place].  LE Dressing with Minimal Assistance.  Grooming while standing at sink with Stand-by Assistance.  Toileting from bedside commode with Minimal Assistance for hygiene and clothing management.   Toilet transfer to bedside commode with Supervision.    DME:  Tub transfer bench is required for pt to return to t/s use with shower chair at present unsuitable with need for mobiltiy greater than pt can safely complete at present.     Patient has a mobility limitation that  significantly impairs their ability to participate in one or more mobility related activities of daily living, including toileting. This deficit can be resolved by using a bedside commode. Patient demonstrates mobility limitations that will cause them to be confined to one room at home without bathroom access for up to 30 days. Using a bedside commode will greatly improve the patient's ability to participate in MRADLs.     Ambulation needs TBD on progress.                              Time Tracking:     OT Date of Treatment: 07/30/24  OT Start Time: 0905  OT Stop Time: 0949  OT Total Time (min): 44 min    Billable Minutes:Self Care/Home Management 8 minutes  Therapeutic Activity 22 minutes  Neuromuscular Re-education 14 minutes    OT/DAYSI: OT     Number of DAYSI visits since last OT visit: 1 7/30/2024

## 2024-07-30 NOTE — TREATMENT PLAN
GI Treatment Plan    Juhi Taylor is a 74 y.o. female admitted to hospital 7/3/2024 (Hospital Day: 28) due to ESRD (end stage renal disease).     Interval History  Hasn't had a BM today or since late yesterday. She is feeling much better. No indication for FMT at this time.     Objective  Temp:  [97.8 °F (36.6 °C)-99 °F (37.2 °C)] 98.1 °F (36.7 °C) (07/30 0822)  Pulse:  [] 98 (07/30 0822)  BP: ()/(50-74) 99/74 (07/30 0822)  Resp:  [18-20] 18 (07/30 0822)  SpO2:  [96 %] 96 % (07/30 0822)    General: Alert, Oriented x3, no distress  Abdomen: improving Non-distended.   Soft. Non-tender. No peritoneal signs.    Laboratory    Recent Labs   Lab 07/28/24  0636 07/29/24  0419 07/30/24  0138   HGB 7.5* 7.3* 7.4*         Assessment and Plan  - continue dificid per ID recommendations  - no indication for FMT at this time   - We are signing-off. Please call with any questions.    Thank you for involving us in the care of Juhi Taylor. Please call with any additional questions, concerns or changes in the patient's clinical status.    Irais Hensley MD  Gastroenterology and Hepatology Fellow, PGY-4  Pager # [phone]

## 2024-07-30 NOTE — PT/OT/SLP PROGRESS
"Physical Therapy Treatment  Co Treatment with Occupational Therapy    Patient Name:  Juhi Taylor   MRN:  [id number]    Recommendations:     Discharge Recommendations: Moderate Intensity Therapy  Discharge Equipment Recommendations: bedside commode, wheelchair, walker, rolling, bath bench, grab bar, hospital bed  Barriers to discharge: Decreased caregiver support and Increased level of assistance required    Assessment:     Juhi Taylor is a 74 y.o. female admitted with a medical diagnosis of Clostridium difficile infection.  She presents with the following impairments/functional limitations: weakness, impaired endurance, impaired self care skills, impaired functional mobility, gait instability, decreased lower extremity function, decreased upper extremity function, decreased coordination, pain, impaired cardiopulmonary response to activity, impaired skin, decreased safety awareness, impaired balance.    Pt met with HOB elevated and agreeable to session. Pt tolerates session well with emphasis on bed mobility, transfers, sitting balance, and neuromuscular re-education. Pt continues to require total assistance x2 persons with bed mobility but sitting balance able to progress to CGA from Total A this session. Pt will continue to benefit from skilled therapy services.    Rehab Prognosis: Good; patient would benefit from acute skilled PT services to address these deficits and reach maximum level of function.    Recent Surgery: Procedure(s) (LRB):  REMOVAL, CATHETER, DIALYSIS, PERITONEAL (N/A)  BRONCHOSCOPY, FLEXIBLE (N/A) 15 Days Post-Op    Plan:     During this hospitalization, patient to be seen 4 x/week to address the identified rehab impairments via gait training, neuromuscular re-education, therapeutic activities, therapeutic exercises and progress toward the following goals:    Plan of Care Expires:  08/12/24    Subjective     Chief Complaint: LLE pain "like electricity" with movement  Patient/Family " "Comments/goals: "I feel stronger already"   Pain/Comfort:  Pain Rating 1: other (see comments) (pain not rated)  Location - Side 1: Left  Location - Orientation 1: generalized  Location 1: leg  Pain Addressed 1: Reposition, Distraction  Pain Rating Post-Intervention 1: other (see comments) (pain not rated)      Objective:     Communicated with RN (Yissel) prior to session.  Patient found HOB elevated with peripheral IV, bowel management system, telemetry upon PTA entry to room.     General Precautions: Standard, fall, special contact  Orthopedic Precautions: N/A  Braces: N/A  Respiratory Status: Nasal cannula, flow 2 L/min     Functional Mobility:  Bed Mobility:     Rolling Right: total assistance x2 persons  Scooting: anteriorly to EOB total assistance x2 persons; to HOB total assistance x2 persons via draw sheet  Supine to Sit: total assistance x2 persons for Trunk/BLEs  Sit to Supine: total assistance x2 persons for Trunk/BLEs  Transfers:     Sit to Stand:  pt declines at this time  Balance:   Sitting Balance at EOB:  Level of Assist Required: Total Assistance progressing to CGA  Deviations noted: slouched posture, R lateral lean, decreased trunk control  Total Time Sitting EOB: 30 minutes      AM-PAC 6 CLICK MOBILITY  Turning over in bed (including adjusting bedclothes, sheets and blankets)?: 2  Sitting down on and standing up from a chair with arms (e.g., wheelchair, bedside commode, etc.): 1  Moving from lying on back to sitting on the side of the bed?: 2  Moving to and from a bed to a chair (including a wheelchair)?: 1  Need to walk in hospital room?: 1  Climbing 3-5 steps with a railing?: 1  Basic Mobility Total Score: 8       Treatment & Education:  Patient provided with daily orientation and goals of this PT session.     Activities completed at EOB:  X5 modified situps  X5 Lateral weight shifts to LUE/Elbow  X3 cervical extension  LAQ x10  AP x10     Pt educated to call for assistance and to transfer with " hospital staff only.  Also, pt was educated on the effects of prolonged immobility and the importance of performing OOB activity and exercises to promote healing and reduce recovery time.    Co tx performed with OT due to patient need for 2 skilled therapist to ensure patient and staff safety and to accommondate for activity tolerance.    Patient left HOB elevated with all lines intact, call button in reach, RN notified, and family present.    GOALS:   Multidisciplinary Problems       Physical Therapy Goals          Problem: Physical Therapy    Goal Priority Disciplines Outcome Goal Variances Interventions   Physical Therapy Goal     PT, PT/OT Progressing     Description: PT goals to be met by: 8/15/24    Patient will perform rolling each way with minimum assistance  Patient will perform supine <> sitting with minimum assistance  Patient will perform sit <> stand transitions with maximal assistance  Patient will perform transfers from bed <> chair or BSC with maximal assistance using LRAD  Patient will perform standing for 60 seconds with maximal assistance using RW or LRAD    DME Justifications (see above for complete DME recommendations)    Bedside Commode- Patient has a mobility limitation that significantly impairs their ability to participate in one or more mobility related activities of daily living, including toileting. This deficit can be resolved by using a bedside commode. Patient demonstrates mobility limitations that will cause them to be confined to one room at home without bathroom access for up to 30 days. Using a bedside commode will greatly improve the patient's ability to participate in MRADLs.     Rolling Walker- Patient demonstrates a mobility limitation that significantly impairs their ability to participate in one or more mobility related activities of daily living. Patient's mobility limitation cannot be sufficiently resolved with the use of a cane, but can be sufficiently resolved with the  use of a rolling walker.The use of a rolling walker will considerably improve their ability to participate in MRADLs. Patient will use the walker on a regular basis at home.      Wheelchair-  Patient has a mobility limitation that significantly impairs their ability to participate in one or more mobility related activities of daily living in customary locations in the home. The mobility limitation cannot be sufficiently resolved by the use of a cane or walker. The use of a manual wheelchair will greatly improve the patient's ability to participate in MRADLs. The patient will use the wheelchair on a regular basis at home. They have expressed their willingness to use a manual wheelchair in the home, and have a caregiver who is available and willing to assist with the wheelchair if needed.     Hospital Bed ·       Patient requires a hospital bed for positioning of the body in ways that are not feasible with an ordinary bed. The patient requires special positioning for pain relief, limited mobility, and/or being unable to independently make changes in body position without the use of a hospital bed. Pillows and wedges will not be adequate for resolving these positional issues.                         Time Tracking:     PT Received On: 07/30/24  PT Start Time: 0905     PT Stop Time: 0949  PT Total Time (min): 44 min     Billable Minutes: Therapeutic Exercise 14 and Neuromuscular Re-education 30    Treatment Type: Treatment  PT/PTA: PTA     Number of PTA visits since last PT visit: 2 07/30/2024

## 2024-07-30 NOTE — ASSESSMENT & PLAN NOTE
Pt previously on PD. Unable to remove enough volume with PD catheter. Initially upgraded to MICU for emergent trialysis placement and CRRT. Now with tunneled dialysis catheter. Tolerated session of HD on 7/12 without complications.      - Nephrology following, appreciate recs  - Strict I/Os  - Renally dose meds  - Avoid nephrotoxic agents  - PD catheter removed 07/15  - Transferred to MICU for SLED  - Tunneled dialysis catheter is not functioning.   - Trialysis catheter placed in left femoral on 7/20.  - Tolerating iHD  - Received albumin 25 g q8h x 3 days per Nephrology, ending 7/25   - Planning for placement of new tunneled HD catheter - plan for 8/1 with interventional nephro

## 2024-07-30 NOTE — ASSESSMENT & PLAN NOTE
Diagnosed 7/22/24.  Worsening leukocytosis and persistent diarrhea on PO vancomycin  Rectal tube placed  ID consulted  Stop p.o. vancomycin and start fidaxomicin  CT abd/pelvis ordered to eval for worsening colitis  Cholestyramine for a binder of fidaxomicin  GI consult as patient may need fecal transplant in the future - not candidate currently

## 2024-07-30 NOTE — PROGRESS NOTES
[facility] - Stepdown Flex ([place] [id number])  [facility] Medicine  Progress Note    Patient Name: Juhi Taylor  MRN: [id number]  Patient Class: IP- Inpatient   Admission Date: 7/3/2024  Length of Stay: 27 days  Attending Physician: Bayron Roy*  Primary Care Provider: Tony Sadler MD        Subjective:     Principal Problem:Clostridium difficile infection        HPI:  Juhi Taylor is a 74-year-old female with a past medical history of CKD stage 5 with recent PD catheter placement on 6/4, RCC s/p right nephrectomy, T2DM, obesity, CAD s/p PCI to Lcx and LAD in 11/2020 and HFmREF who is admitted as a transfer from [facility] for higher level of care and further cardiac evaluation. Nephrology consulted for hemodialysis. She initially presented on 6/18/24 to [facility] for hypervolemia in the setting of CKD 4-5 for which she had undergone elective PD catheter placement complicated by catheter site leaking. She had a complex hospital course, was initially placed on furosemide gtt without significant improvement and later underwent placement of tunneled line for HD with volume removal. During her second dialysis session she went into PEA arrest for which she was resuscitated, intubated and transferred to ICU. She was noted to have elevated troponin and subsequent TTE found reduced EF from 40-45% to 20-25% for which coronary angiography was recommended. Her hospital course was also complicated by dialysis line infection, significant bleeding around the catheter site, bradycardia and subsequent atrial fibrillation with RVR. Given the catheter site bleeding, anticoagulation was temporarily held. She has reported allergy to amiodarone, was initially placed on diltiazem for atrial fibrillation despite reduced EF. She discussed GOC at outside hospital where it appears that she lacked capacity per note and decided to [place] [facility] At that time, gen surgery was planning to place a left  tunneled catheter due to concerns of the right side not being suitable     Upon admit, patient appeared drowsy but conversant. Daughter reports that she typically gets lethargic 2/2 to the volume overload due to not receiving dialysis. Patient reported b/l left lower extremity pain. Daughter concerned about her not having dialysis in 4 days and wants a session today.      Pt initially refusing dialysis. Seen by nephrology. VBG 7.220/33.4/34/13.7/-14. Critical care medicine consulted for emergent trialysis line placement and dialysis.     Overview/Hospital Course:  Ms. Taylor was admitted to MICU on 7/8 for urgent dialysis line placement and initiated of HD. Patient received short run of CRRT overnight without issue. Encephalopathy improving with continued CRRT treatments. Persistent AFib with RVR, received Digoxin loading dose (renally dosed), restarted PO Carvedilol. Patient remains in persistent AFib RVR despite therapeutic digoxin level and uptitration of Carvedilol levels, started on Amiodarone infusion overnight. Transitioned to amio PO, started on heparin gtt for afib AC. Tolerated HD well overnight without complications and less encephalopathic upon exam. Planning for gen surg to remove PD catheter. Upon induction pt vomited copious amounts of bile, was intubated. PD cathter removed successfully. Extubated to NC 7/16.      7/18:  Patient made DNR after Palliative Care discussions yesterday.  Tolerated SLED 12 hours overnight.  Discussed difficulties regarding HD line with Interventional Nephrology and plans to replace it next week if patient it amenable.  Patient was stepped down from ICU.  7/19:  Stepped back up to medical ICU with plans for overnight SLED x 3 days.     On 7/19 Nephrology requested that the patient be stepped back up to the medical ICU for SLED. A temporary trialysis HD catheter was placed on 7/20/24 in the groin. She also began having diarrhea, which tested positive for Clostridium  difficile so she was started on PO vancomycin 7/22/24. HD trial on 7/24; remained HDS. Ready for stepdown.    ID consulted and patient changed to fidaximicin. CT with Prominent wall thickening of the transverse colon and descending colon most prominent at the proximal transverse colon. GI consulted and pt not a candidate for fecal transplant currently.   Interventional nephrology plan to replace tunneled line on 8/1/24    Interval History: No acute events. Afebrile  Hb stable and no overt signs of bleeding  States she has oral pain and some white spots in mouth    Review of Systems  Objective:     Vital Signs (Most Recent):  Temp: 98.3 °F (36.8 °C) (07/30/24 1455)  Pulse: 103 (07/30/24 1455)  Resp: 20 (07/30/24 1455)  BP: 109/67 (07/30/24 1455)  SpO2: 95 % (07/30/24 1455) Vital Signs (24h Range):  Temp:  [97.7 °F (36.5 °C)-98.5 °F (36.9 °C)] 98.3 °F (36.8 °C)  Pulse:  [] 103  Resp:  [18-20] 20  SpO2:  [95 %-99 %] 95 %  BP: ()/(50-74) 109/67     Weight: 128.5 kg (283 lb 4.7 oz)  Body mass index is 44.37 kg/m².    Intake/Output Summary (Last 24 hours) at 7/30/2024 1512  Last data filed at 7/29/2024 2032  Gross per 24 hour   Intake 3300 ml   Output 1000 ml   Net 2300 ml      Physical Exam  Constitutional:       General: She is not in acute distress.     Appearance: Normal appearance. She is well-developed. She is not ill-appearing or diaphoretic.   HENT:      Head: Normocephalic and atraumatic.      Right Ear: External ear normal.      Left Ear: External ear normal.      Nose: Nose normal.   Eyes:      General: No scleral icterus.        Right eye: No discharge.         Left eye: No discharge.      Extraocular Movements: Extraocular movements intact.      Conjunctiva/sclera: Conjunctivae normal.   Cardiovascular:      Rate and Rhythm: Tachycardia present.   Pulmonary:      Effort: Pulmonary effort is normal. No respiratory distress.      Breath sounds: No stridor.   Abdominal:      Palpations: Abdomen is  "soft.      Tenderness: There is abdominal tenderness.      Comments: Former PD site c/d/i   Musculoskeletal:         General: Normal range of motion.   Skin:     General: Skin is dry.      Findings: No erythema or rash.   Neurological:      General: No focal deficit present.      Mental Status: She is alert and oriented to person, place, and time. Mental status is at baseline.      Cranial Nerves: No cranial nerve deficit.   Psychiatric:         Mood and Affect: Mood normal.         Behavior: Behavior normal.         Thought Content: Thought content normal.         Judgment: Judgment normal.         MELD 3.0: 20 at 7/23/2024  2:01 PM  MELD-Na: 21 at 7/23/2024  2:01 PM  Calculated from:  Serum Creatinine: On dialysis. Using the maximum value.  Serum Sodium: 140 mmol/L (Using max of 137 mmol/L) at 7/23/2024  2:01 PM  Total Bilirubin: 0.8 mg/dL (Using min of 1 mg/dL) at 7/23/2024  4:02 AM  Serum Albumin: 1.8 g/dL at 7/23/2024  2:01 PM  INR(ratio): 1.1 at 7/21/2024  1:08 PM  Age at listing (hypothetical): 74 years  Sex: Female at 7/23/2024  2:01 PM      Significant Labs:  CBC:  Recent Labs   Lab 07/29/24  0419 07/30/24  0138   WBC 17.92* 19.15*   HGB 7.3* 7.4*   HCT 23.3* 23.6*   * 158     CMP:  Recent Labs   Lab 07/29/24  0419 07/30/24  0138    136   K 4.0 3.9    108   CO2 15* 16*   * 110   BUN 47* 49*   CREATININE 6.3* 6.3*   CALCIUM 8.9 8.9   ALBUMIN 2.0* 1.9*   ANIONGAP 12 12     PTINR:  No results for input(s): "INR" in the last 48 hours.    Significant Procedures:   Dobutamine Stress Test with Color Flow: No results found for this or any previous visit.    Assessment/Plan:      * Clostridium difficile infection  Diagnosed 7/22/24.  Worsening leukocytosis and persistent diarrhea on PO vancomycin  Rectal tube placed  ID consulted  Stop p.o. vancomycin and start fidaxomicin  CT abd/pelvis ordered to eval for worsening colitis  Cholestyramine for a binder of fidaxomicin  GI consult as patient " may need fecal transplant in the future - not candidate currently      Acute deep vein thrombosis (DVT) of femoral vein of left lower extremity  LE venous doppler identified nonocclusive thrombus within the mid and distal femoral vein.   Cont heparin gtt.       Diarrhea  +C.diff,       Debility  PT/OT    Palliative care encounter  - Palliative care following, continued GOC with family.   - Patient has requested code status changed to DNR      ACP (advance care planning)  --Palliative care consulted, appreciate their assistance in clarification of GOC/POC with patient and family.      Atrial fibrillation with RVR  --AFib with RVR appears new following cardiac arrest at OSH.   --Patient endorses amiodarone reaction with itching, rash, and oral burning. Refused Sotalol, DCCV, and AICD placement at OSH.   --Was placed on Diltizem infusion and transitioned to PO metoprolol. Amio gtt restarted overnight 7/11 s/t uncontrolled afib with RVR. Transitioned to PO amio.  --Patient's rate remained elevated despite amiodarone. Appeared to respond better to metoprolol.   --Continue Toprol 50 mg daily.   --Apixaban on hold. Anticoagulation with a heparin infusion.      Acute on chronic combined systolic and diastolic heart failure  Echo    Left Ventricle: The left ventricle is moderately dilated. Normal wall thickness. Global hypokinesis present. There is severely reduced systolic function with a visually estimated ejection fraction of 20 - 25%.    Right Ventricle: Mild right ventricular enlargement. Wall thickness is normal. Right ventricle wall motion has global hypokinesis. Systolic function is moderately reduced.    Left Atrium: Left atrium is severely dilated.    Right Atrium: Right atrium is severely dilated.    Aortic Valve: The aortic valve is a trileaflet valve. There is mild aortic valve sclerosis.    Mitral Valve: There is mild regurgitation.    Tricuspid Valve: There is severe regurgitation.    Pulmonary Artery: There  is moderate pulmonary hypertension. The estimated pulmonary artery systolic pressure is 61 mmHg.    IVC/SVC: Elevated venous pressure at 15 mmHg.    Pericardium: There is a trivial circumferential effusion. No indication of cardiac tamponade.     - Will require interventional cardiology for LHC vs PET for reduced EF once euvolemic - deferring given pt's illness and co morbidities, infection  - LifeVest prior to discharge  - Continue Entresto 24-26mg BID per cardiology recs    ESRD (end stage renal disease)  Pt previously on PD. Unable to remove enough volume with PD catheter. Initially upgraded to MICU for emergent trialysis placement and CRRT. Now with tunneled dialysis catheter. Tolerated session of HD on 7/12 without complications.      - Nephrology following, appreciate recs  - Strict I/Os  - Renally dose meds  - Avoid nephrotoxic agents  - PD catheter removed 07/15  - Transferred to MICU for SLED  - Tunneled dialysis catheter is not functioning.   - Trialysis catheter placed in left femoral on 7/20.  - Tolerating iHD  - Received albumin 25 g q8h x 3 days per Nephrology, ending 7/25   - Planning for placement of new tunneled HD catheter - plan for 8/1 with interventional nephro    Severe obesity (BMI >= 40)  Body mass index is 44.37 kg/m². Morbid obesity complicates all aspects of disease management from diagnostic modalities to treatment. Weight loss encouraged and health benefits explained to patient.         Type 2 diabetes mellitus with microalbuminuria, without long-term current use of insulin  --Latest A1C 6.8; takes metformin at home.   --Target -180. Within goal.   --Continue to monitor.       Essential hypertension  --Takes carvedilol + clonidine at home.   --Hold antihypertensives due to hypotension.   --Continue metoprolol for rate control.       VTE Risk Mitigation (From admission, onward)           Ordered     heparin 25,000 units in dextrose 5% (100 units/ml) IV bolus from bag LOW INTENSITY  nomogram - [facility]  As needed (PRN)        Question:  Heparin Infusion Adjustment (DO NOT MODIFY ANSWER)  Answer:  [email or web address]\Images\Pharmacy\HeparinInfusions\heparin LOW INTENSITY nomogram for [facility] [id number]    07/21/24 1236     heparin 25,000 units in dextrose 5% (100 units/ml) IV bolus from bag LOW INTENSITY nomogram - [facility]  As needed (PRN)        Question:  Heparin Infusion Adjustment (DO NOT MODIFY ANSWER)  Answer:  [email or web address]\epic\Images\Pharmacy\HeparinInfusions\heparin LOW INTENSITY nomogram for [facility] [id number]    07/21/24 1236     heparin 25,000 units in dextrose 5% 250 mL (100 units/mL) infusion LOW INTENSITY nomogram - [facility]  Continuous        Question:  Begin at (units/kg/hr)  Answer:  12    07/21/24 1236     heparin 25,000 units in dextrose 5% 250 mL (100 units/mL) infusion (heparin infusion - NO NOMOGRAM)  Continuous         07/20/24 1217     Place sequential compression device  Until discontinued         07/15/24 1330     IP VTE HIGH RISK PATIENT  Once         07/15/24 1330                    Discharge Planning   HARINI: 8/2/2024     Code Status: DNR   Is the patient medically ready for discharge?:     Reason for patient still in hospital (select all that apply): Patient trending condition, Treatment, and Consult recommendations  Discharge Plan A: Skilled Nursing Facility   Discharge Delays: (!) Procedure Scheduling (IR, OR, Labs, Echo, Cath, Echo, EEG)      Bayron Roy MD  Department of Hospital Medicine   [facility] - Stepdown Flex ([place]

## 2024-07-30 NOTE — PLAN OF CARE
Problem: Adult Inpatient Plan of Care  Goal: Plan of Care Review  Outcome: Progressing  Flowsheets (Taken 7/29/2024 1944)  Plan of Care Reviewed With: patient  Goal: Patient-Specific Goal (Individualized)  Outcome: Progressing  Goal: Absence of Hospital-Acquired Illness or Injury  Outcome: Progressing  Intervention: Identify and Manage Fall Risk  Flowsheets (Taken 7/29/2024 1944)  Safety Promotion/Fall Prevention: assistive device/personal item within reach  Intervention: Prevent Skin Injury  Flowsheets (Taken 7/29/2024 1944)  Body Position: turned  Skin Protection: incontinence pads utilized  Device Skin Pressure Protection: tubing/devices free from skin contact  Intervention: Prevent and Manage VTE (Venous Thromboembolism) Risk  Flowsheets (Taken 7/29/2024 1944)  VTE Prevention/Management: bleeding precations maintained    Patient alert and oriented x4; care plan discussed with patient and daughter at bedside; patient to HD, no fluid removed; placed on immersion bed; heparin not therapeutic in AM, rate reduced and re-checked with x1 therapeutic value; call light and personal belongings within reach; will continue with poc.

## 2024-07-30 NOTE — NURSING
Pt had two separate ptt results under the same time of 0138. Lab called for explanation but suggested a recollect. Amelia Andrade MD notified and also suggested recollect and hold heparin drip until results are verified. Heparin drip held and stat ptt ordered.

## 2024-07-30 NOTE — SUBJECTIVE & OBJECTIVE
Interval History: Diarrhea improving with fidaxomycin. No new complaints. Feels better. Afebrile.    Review of Systems   Constitutional:  Negative for chills, diaphoresis and fever.   Respiratory:  Negative for cough, chest tightness and shortness of breath.    Cardiovascular:  Negative for chest pain and leg swelling.   Gastrointestinal:  Positive for abdominal pain and diarrhea. Negative for constipation, nausea and vomiting.   Musculoskeletal:  Negative for back pain.   Skin:  Negative for color change and rash.   Neurological:  Negative for headaches.   Psychiatric/Behavioral:  Negative for agitation and confusion. The patient is not nervous/anxious.      Objective:     Vital Signs (Most Recent):  Temp: 97.7 °F (36.5 °C) (07/30/24 1137)  Pulse: (!) 117 (07/30/24 1137)  Resp: 20 (07/30/24 1137)  BP: 127/63 (07/30/24 1137)  SpO2: 99 % (07/30/24 1137) Vital Signs (24h Range):  Temp:  [97.7 °F (36.5 °C)-98.8 °F (37.1 °C)] 97.7 °F (36.5 °C)  Pulse:  [] 117  Resp:  [18-20] 20  SpO2:  [96 %-99 %] 99 %  BP: ()/(50-74) 127/63     Weight: 128.5 kg (283 lb 4.7 oz)  Body mass index is 44.37 kg/m².    Estimated Creatinine Clearance: 11.3 mL/min (A) (based on SCr of 6.3 mg/dL (H)).     Physical Exam  Constitutional:       General: She is not in acute distress.     Appearance: Normal appearance. She is well-developed. She is not ill-appearing or diaphoretic.   HENT:      Head: Normocephalic and atraumatic.      Right Ear: External ear normal.      Left Ear: External ear normal.      Nose: Nose normal.   Eyes:      General: No scleral icterus.        Right eye: No discharge.         Left eye: No discharge.      Extraocular Movements: Extraocular movements intact.      Conjunctiva/sclera: Conjunctivae normal.   Cardiovascular:      Rate and Rhythm: Tachycardia present.   Pulmonary:      Effort: Pulmonary effort is normal. No respiratory distress.      Breath sounds: No stridor.   Abdominal:      Palpations: Abdomen  is soft.      Tenderness: There is abdominal tenderness.      Comments: Former PD site c/d/i   Genitourinary:     Comments: Rectal tube  Musculoskeletal:         General: Normal range of motion.   Skin:     General: Skin is dry.      Findings: No erythema or rash.   Neurological:      General: No focal deficit present.      Mental Status: She is alert and oriented to person, place, and time. Mental status is at baseline.      Cranial Nerves: No cranial nerve deficit.   Psychiatric:         Mood and Affect: Mood normal.         Behavior: Behavior normal.         Thought Content: Thought content normal.         Judgment: Judgment normal.          Significant Labs: Blood Culture:   Recent Labs   Lab 06/29/24  1607 07/17/24  1737 07/17/24  1755 07/29/24  1345 07/29/24  1346   LABBLOO No growth after 5 days.  No growth after 5 days. No growth after 5 days. No growth after 5 days. No Growth to date No Growth to date     CBC:   Recent Labs   Lab 07/29/24  0419 07/30/24  0138   WBC 17.92* 19.15*   HGB 7.3* 7.4*   HCT 23.3* 23.6*   * 158     CMP:   Recent Labs   Lab 07/29/24  0419 07/30/24  0138    136   K 4.0 3.9    108   CO2 15* 16*   * 110   BUN 47* 49*   CREATININE 6.3* 6.3*   CALCIUM 8.9 8.9   ALBUMIN 2.0* 1.9*   ANIONGAP 12 12     Microbiology Results (last 7 days)       Procedure Component Value Units Date/Time    Fungus culture [[id number]] Collected: 07/10/24 1630    Order Status: Completed Specimen: Body Fluid from Peritoneal Fluid Updated: 07/30/24 1114     Fungus (Mycology) Culture Culture in progress      No fungus isolated after 2 weeks    Blood culture [[id number]] Collected: 07/29/24 1345    Order Status: Completed Specimen: Blood Updated: 07/29/24 2115     Blood Culture, Routine No Growth to date    Blood culture [[id number]] Collected: 07/29/24 1346    Order Status: Completed Specimen: Blood Updated: 07/29/24 2115     Blood Culture, Routine No Growth to date          Recent Lab  Results  (Last 5 results in the past 24 hours)        07/30/24  1133   07/30/24  0828   07/30/24  0358   07/30/24  0138   07/29/24  2022        Albumin       1.9         Anion Gap       12         Aniso       Moderate         PTT     40.7  Comment: Refer to local heparin nomogram for intensity/dose specific   therapeutic   range.     96.8  Comment: Refer to local heparin nomogram for intensity/dose specific   therapeutic   range.                  88.1  Comment: Refer to local heparin nomogram for intensity/dose specific   therapeutic   range.           Baso #       0.08         Basophil %       0.4         BUN       49         Calcium       8.9         Chloride       108         CO2       16         Creatinine       6.3         CRP       313.6         Differential Method       Automated         eGFR       6.6         Eos #       0.1         Eos %       0.5         Glucose       110         Gran # (ANC)       16.1         Gran %       84.0         Hematocrit       23.6         Hemoglobin       7.4         Hypo       Occasional         Immature Grans (Abs)       0.34  Comment: Mild elevation in immature granulocytes is non specific and   can be seen in a variety of conditions including stress response,   acute inflammation, trauma and pregnancy. Correlation with other   laboratory and clinical findings is essential.           Immature Granulocytes       1.8         Lymph #       1.6         Lymph %       8.1         Magnesium        2.1         MCH       26.5         MCHC       31.4         MCV       85         Mono #       1.0         Mono %       5.2         MPV       10.0         nRBC       0         Ovalocytes       Occasional         Phosphorus Level       4.8                4.8         Platelet Estimate       Appears normal         Platelet Count       158         POCT Glucose 99   139       118       Poikilocytosis       Slight         Poly       Occasional         Potassium       3.9         RBC       2.79          RDW       22.2         Sodium       136         Spherocytes       Occasional         Target Cells       Occasional         WBC       19.15                                Significant Imaging:       CT Abdomen Pelvis Without Contrast [[id number]] (Abnormal) Resulted: 07/28/24 2121   Order Status: Completed Updated: 07/28/24 2123   Narrative:     EXAMINATION:  CT ABDOMEN PELVIS WITHOUT CONTRAST    CLINICAL HISTORY:  c diff, eval for toxic megacolon;    TECHNIQUE:  Low dose axial images, sagittal and coronal reformations were obtained from the lung bases to the pubic symphysis, Oral contrast was not administered.    COMPARISON:  08/05/2021    FINDINGS:  Heart: The heart is enlarged.  Prominent coronary atherosclerosis.    Lung Bases: Small basilar pleural effusions with mild associated atelectasis or consolidation.    Liver: The liver is enlarged.  No focal abnormality.  Limited characterization on this noncontrast study.    Gallbladder: Status post cholecystectomy.    Bile Ducts: No evidence of dilated ducts.    Pancreas: No mass or peripancreatic fat stranding.    Spleen: Unremarkable.    Adrenals: Unremarkable.    Kidneys/ Ureters: Status post right nephrectomy.  Stable probable tiny subcentimeter cyst with minimal wall calcification on the left.  Nonobstructing nephrolithiasis of the left kidney in the upper pole.  No hydronephrosis or hydroureter on the left.    Bladder: No evidence of wall thickening.    Reproductive organs: Status post hysterectomy.    GI Tract/Mesentery: There is prominent wall thickening noted to the proximal transverse colon on the right.  Wall thickening is less prominent in the distal transverse colon and descending colon.  Findings could be associated with colitis.  An underlying mass in the proximal transverse colon could not be excluded.  Colonoscopy follow-up post treatment recommended.  The colon is not significantly dilated.    There is a rectal tube and distal balloon  present.    Peritoneal Space: There is mild ascites in the abdomen and pelvis.  No free air.    Retroperitoneum: No significant adenopathy.    Abdominal wall: There is moderate edema in the subcutaneous fat in the flank region bilaterally.  Minimal fluid in the subcutaneous fat in the lower fat pannus on the left may be associated with injections    Vasculature: No significant atherosclerosis or aneurysm.    Bones: No acute fracture.    Degenerative disc changes in the lumbar spine.   Impression:       1. Prominent wall thickening of the transverse colon and descending colon most prominent at the proximal transverse colon.  The findings could represent colitis.  An underlying mass in the proximal transverse colon cannot be excluded.  See above comments.  Follow-up is recommended.  2. Hepatomegaly with mild ascites in the abdomen and pelvis.  3. Cardiomegaly with prominent coronary atherosclerosis.  4. Small bibasilar pleural effusions with mild associated atelectasis or consolidation.  5. Status post right nephrectomy.  Probable small subcentimeter minimally complex left renal cyst with minimal wall calcification.  No significant change.  6. See above comments also.  7. This report was flagged in Epic as abnormal.      Electronically signed by: Russell Felix  Date: 07/28/2024  Time: 21:21

## 2024-07-30 NOTE — PROGRESS NOTES
[facility] ([place] [id number])  Nephrology  Progress Note    Patient Name: Juhi Taylor  MRN: [id number]  Admission Date: 7/3/2024  Hospital Length of Stay: 27 days  Attending Provider: Bayron Roy*   Primary Care Physician: Tony Sadler MD  Principal Problem:Clostridium difficile infection    Subjective:     HPI: 74-year-old female with prior history of CKD stage 5 with recent PD catheter placement on 6/4, RCC s/p right nephrectomy, T2DM, obesity, CAD s/p PCI to Lcx and LAD in 11/2020 and HFmREF who is admitted as a transfer from [facility] for higher level of care and further cardiac evaluation. Nephrology consulted for hemodialysis.     She initially presented on 6/18/24 to [facility] for hypervolemia in the setting of CKD 4-5 for which she had undergone elective PD catheter placement complicated by catheter site leaking. She had a complex hospital course, was initially placed on furosemide gtt without significant improvement and later underwent placement of tunneled line for HD with volume removal. During her second dialysis session she went into PEA arrest for which she was resuscitated, intubated and transferred to ICU. She was noted to have elevated troponin and subsequent TTE found reduced EF from 40-45% to 20-25% for which coronary angiography was recommended. Her hospital course was also complicated by dialysis line infection, significant bleeding around the catheter site, bradycardia and subsequent atrial fibrillation with RVR. Given the catheter site bleeding, anticoagulation was temporarily held. She has reported allergy to amiodarone, was initially placed on diltiazem for atrial fibrillation despite reduced EF. She discussed GOC at outside hospital where it appears that she lacked capacity per note and decided to pursue higher level of care at [facility] [place], [place]. At that time, gen surgery was planning to place a left tunneled catheter due to concerns of the right side not being  suitable    Upon admit, patient appeared drowsy but conversant. Daughter reports that she typically gets lethargic 2/2 to the volume overload due to not receiving dialysis. Patient reported b/l left lower extremity pain. Daughter concerned about her not having dialysis in 4 days and wants a session today.     Interval History: HD done yesterday, 0UF, on 2 liters oxygen, planing for TDC placement on Thursday with anesthesia by Dr Landa.  Next HD tomorrow.    Review of patient's allergies indicates:   Allergen Reactions    Percocet [oxycodone-acetaminophen] Itching    Januvia [sitagliptin]     Jardiance [empagliflozin]      Leg cramps    Lipitor [atorvastatin] Other (See Comments)     Severe leg pain    Linaclotide Other (See Comments) and Nausea And Vomiting     Does not remember    Lubiprostone Other (See Comments) and Palpitations     Does not remember     Current Facility-Administered Medications   Medication Frequency    0.9%  NaCl infusion (for blood administration) Q24H PRN    acetaminophen tablet 650 mg Q6H PRN    cholestyramine 4 gram packet 4 g BID    dextromethorphan-guaiFENesin  mg/5 ml liquid 5 mL Q4H PRN    dextrose 10% bolus 125 mL 125 mL PRN    dextrose 10% bolus 250 mL 250 mL PRN    diphenhydrAMINE capsule 25 mg Q6H PRN    famotidine tablet 20 mg Daily    ferrous sulfate tablet 1 each Daily    fidaxomicin tablet 200 mg BID    glucagon (human recombinant) injection 1 mg PRN    glucose chewable tablet 16 g PRN    glucose chewable tablet 24 g PRN    heparin 25,000 units in dextrose 5% (100 units/ml) IV bolus from bag LOW INTENSITY nomogram - [facility] PRN    heparin 25,000 units in dextrose 5% (100 units/ml) IV bolus from bag LOW INTENSITY nomogram - [facility] PRN    heparin 25,000 units in dextrose 5% 250 mL (100 units/mL) infusion LOW INTENSITY nomogram - [facility] Continuous    hyoscyamine SL tablet 0.125 mg Q4H PRN    insulin aspart U-100 pen 0-10 Units QID (AC + HS) PRN    LIDOcaine 5 % patch 2 patch Q24H     metoprolol injection 5 mg Q6H PRN    metoprolol tartrate (LOPRESSOR) tablet 50 mg BID    miconazole NITRATE 2 % top powder BID    midodrine tablet 10 mg Daily PRN    naloxone 0.4 mg/mL injection 0.02 mg PRN    ondansetron injection 4 mg Q6H PRN    sacubitriL-valsartan 24-26 mg per tablet 1 tablet BID    simethicone chewable tablet 80 mg TID PRN    sodium chloride 0.9% bolus 250 mL 250 mL PRN    sodium chloride 0.9% flush 10 mL PRN    traMADoL tablet 50 mg Q8H PRN       Objective:     Vital Signs (Most Recent):  Temp: 97.7 °F (36.5 °C) (07/30/24 1137)  Pulse: (!) 117 (07/30/24 1137)  Resp: 20 (07/30/24 1137)  BP: 127/63 (07/30/24 1137)  SpO2: 99 % (07/30/24 1137) Vital Signs (24h Range):  Temp:  [97.7 °F (36.5 °C)-98.8 °F (37.1 °C)] 97.7 °F (36.5 °C)  Pulse:  [] 117  Resp:  [18-20] 20  SpO2:  [96 %-99 %] 99 %  BP: ()/(50-74) 127/63     Weight: 128.5 kg (283 lb 4.7 oz) (07/24/24 1247)  Body mass index is 44.37 kg/m².  Body surface area is 2.46 meters squared.    I/O last 3 completed shifts:  In: 3300 [I.V.:300; Other:3000]  Out: 1000 [Other:600; Stool:400]     Physical Exam  Constitutional:       General: She is not in acute distress.     Appearance: Normal appearance.   Cardiovascular:      Rate and Rhythm: Normal rate.      Pulses: Normal pulses.   Pulmonary:      Effort: Pulmonary effort is normal. No respiratory distress.      Breath sounds: No wheezing or rhonchi.   Musculoskeletal:      Right lower leg: No edema.      Left lower leg: No edema.   Skin:     General: Skin is warm.   Neurological:      General: No focal deficit present.      Mental Status: She is alert and oriented to person, place, and time.          Significant Labs:  BMP:   Recent Labs   Lab 07/30/24 0138         K 3.9      CO2 16*   BUN 49*   CREATININE 6.3*   CALCIUM 8.9   MG 2.1     CBC:   Recent Labs   Lab 07/30/24 0138   WBC 19.15*   RBC 2.79*   HGB 7.4*   HCT 23.6*      MCV 85   MCH 26.5*   MCHC 31.4*         Significant Imaging:  Labs: Reviewed  ECG: Reviewed  X-Ray: Reviewed  US: Reviewed  CT: Reviewed  MRI: Reviewed  Assessment/Plan:     Cardiac/Vascular  Acute on chronic combined systolic and diastolic heart failure  Per primary    Renal/  ESRD (end stage renal disease)  ESRD - was briefly on PD then HD, having issues w tunnel cath and now w trialysis in place in [facility]. S/p R nephrectomy RCC  C diff positive - on oral vanc  HTN  DM  Afib w rvr  CHF w ischemic cardiomyopathy  S/p cardiac arrest     Plan/Recommendations  -Planing for iHD tomorrow.  -Dr Landa is planing for TDC placement on this Thursday, 08/01.  -Renally adjust medications  -Pre and Post-HD weights   -Continue to monitor intake and output       Anemia in ESRD;  .Hemoglobin goal in ESRD is 10-12g/dl  Can receive iron infusion once infection resolves.  .  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  Lab Results   Component Value Date    .7 (H) 05/08/2024    CALCIUM 8.9 07/30/2024    CAION 1.02 (L) 07/20/2024    PHOS 4.8 (H) 07/30/2024    PHOS 4.8 (H) 07/30/2024            Thank you for your consult. I will follow-up with patient. Please contact us if you have any additional questions.    Edith Lazo MD  Nephrology  [facility] ([place])

## 2024-07-30 NOTE — PLAN OF CARE
Care plan reviewed.  Problem: Adult Inpatient Plan of Care  Goal: Plan of Care Review  Outcome: Progressing  Goal: Patient-Specific Goal (Individualized)  Outcome: Progressing  Goal: Absence of Hospital-Acquired Illness or Injury  Outcome: Progressing  Goal: Optimal Comfort and Wellbeing  Outcome: Progressing  Goal: Readiness for Transition of Care  Outcome: Progressing     Problem: Diabetes Comorbidity  Goal: Blood Glucose Level Within Targeted Range  Outcome: Progressing     Problem: Sepsis/Septic Shock  Goal: Optimal Coping  Outcome: Progressing  Goal: Absence of Bleeding  Outcome: Progressing  Goal: Blood Glucose Level Within Targeted Range  Outcome: Progressing  Goal: Absence of Infection Signs and Symptoms  Outcome: Progressing  Goal: Optimal Nutrition Intake  Outcome: Progressing     Problem: Bariatric Environmental Safety  Goal: Safety Maintained with Care  Outcome: Progressing     Problem: Wound  Goal: Optimal Coping  Outcome: Progressing  Goal: Optimal Functional Ability  Outcome: Progressing  Goal: Absence of Infection Signs and Symptoms  Outcome: Progressing  Goal: Improved Oral Intake  Outcome: Progressing  Goal: Optimal Pain Control and Function  Outcome: Progressing  Goal: Skin Health and Integrity  Outcome: Progressing  Goal: Optimal Wound Healing  Outcome: Progressing     Problem: Skin Injury Risk Increased  Goal: Skin Health and Integrity  Outcome: Progressing     Problem: Fall Injury Risk  Goal: Absence of Fall and Fall-Related Injury  Outcome: Progressing     Problem: Coping Ineffective  Goal: Effective Coping  Outcome: Progressing     Problem: CRRT (Continuous Renal Replacement Therapy)  Goal: Safe, Effective Therapy Delivery  Outcome: Progressing  Goal: Hemodynamic Stability  Outcome: Progressing  Goal: Body Temperature Maintained in Desired Range  Outcome: Progressing  Goal: Absence of Infection Signs and Symptoms  Outcome: Progressing     Problem: Infection  Goal: Absence of Infection Signs  and Symptoms  Outcome: Progressing     Problem: Hemodialysis  Goal: Safe, Effective Therapy Delivery  Outcome: Progressing  Goal: Effective Tissue Perfusion  Outcome: Progressing  Goal: Absence of Infection Signs and Symptoms  Outcome: Progressing     Problem: Mechanical Ventilation Invasive  Goal: Effective Communication  Outcome: Progressing  Goal: Optimal Device Function  Outcome: Progressing  Goal: Mechanical Ventilation Liberation  Outcome: Progressing  Goal: Optimal Nutrition Delivery  Outcome: Progressing  Goal: Absence of Device-Related Skin and Tissue Injury  Outcome: Progressing  Goal: Absence of Ventilator-Induced Lung Injury  Outcome: Progressing     Problem: Artificial Airway  Goal: Effective Communication  Outcome: Progressing  Goal: Optimal Device Function  Outcome: Progressing  Goal: Absence of Device-Related Skin or Tissue Injury  Outcome: Progressing

## 2024-07-30 NOTE — ASSESSMENT & PLAN NOTE
ESRD - was briefly on PD then HD, having issues w tunnel cath and now w trialysis in place in [facility]. S/p R nephrectomy RCC  C diff positive - on oral vanc  HTN  DM  Afib w rvr  CHF w ischemic cardiomyopathy  S/p cardiac arrest     Plan/Recommendations  -Planing for iHD tomorrow.  -Dr Landa is planing for TDC placement on this Thursday, 08/01.  -Renally adjust medications  -Pre and Post-HD weights   -Continue to monitor intake and output       Anemia in ESRD;  .Hemoglobin goal in ESRD is 10-12g/dl  Can receive iron infusion once infection resolves.  .  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  Lab Results   Component Value Date    .7 (H) 05/08/2024    CALCIUM 8.9 07/30/2024    CAION 1.02 (L) 07/20/2024    PHOS 4.8 (H) 07/30/2024    PHOS 4.8 (H) 07/30/2024

## 2024-07-30 NOTE — SUBJECTIVE & OBJECTIVE
Interval History: No acute events. Afebrile  Hb stable and no overt signs of bleeding  States she has oral pain and some white spots in mouth    Review of Systems  Objective:     Vital Signs (Most Recent):  Temp: 98.3 °F (36.8 °C) (07/30/24 1455)  Pulse: 103 (07/30/24 1455)  Resp: 20 (07/30/24 1455)  BP: 109/67 (07/30/24 1455)  SpO2: 95 % (07/30/24 1455) Vital Signs (24h Range):  Temp:  [97.7 °F (36.5 °C)-98.5 °F (36.9 °C)] 98.3 °F (36.8 °C)  Pulse:  [] 103  Resp:  [18-20] 20  SpO2:  [95 %-99 %] 95 %  BP: ()/(50-74) 109/67     Weight: 128.5 kg (283 lb 4.7 oz)  Body mass index is 44.37 kg/m².    Intake/Output Summary (Last 24 hours) at 7/30/2024 1512  Last data filed at 7/29/2024 2032  Gross per 24 hour   Intake 3300 ml   Output 1000 ml   Net 2300 ml      Physical Exam  Constitutional:       General: She is not in acute distress.     Appearance: Normal appearance. She is well-developed. She is not ill-appearing or diaphoretic.   HENT:      Head: Normocephalic and atraumatic.      Right Ear: External ear normal.      Left Ear: External ear normal.      Nose: Nose normal.   Eyes:      General: No scleral icterus.        Right eye: No discharge.         Left eye: No discharge.      Extraocular Movements: Extraocular movements intact.      Conjunctiva/sclera: Conjunctivae normal.   Cardiovascular:      Rate and Rhythm: Tachycardia present.   Pulmonary:      Effort: Pulmonary effort is normal. No respiratory distress.      Breath sounds: No stridor.   Abdominal:      Palpations: Abdomen is soft.      Tenderness: There is abdominal tenderness.      Comments: Former PD site c/d/i   Musculoskeletal:         General: Normal range of motion.   Skin:     General: Skin is dry.      Findings: No erythema or rash.   Neurological:      General: No focal deficit present.      Mental Status: She is alert and oriented to person, place, and time. Mental status is at baseline.      Cranial Nerves: No cranial nerve deficit.  "  Psychiatric:         Mood and Affect: Mood normal.         Behavior: Behavior normal.         Thought Content: Thought content normal.         Judgment: Judgment normal.         MELD 3.0: 20 at 7/23/2024  2:01 PM  MELD-Na: 21 at 7/23/2024  2:01 PM  Calculated from:  Serum Creatinine: On dialysis. Using the maximum value.  Serum Sodium: 140 mmol/L (Using max of 137 mmol/L) at 7/23/2024  2:01 PM  Total Bilirubin: 0.8 mg/dL (Using min of 1 mg/dL) at 7/23/2024  4:02 AM  Serum Albumin: 1.8 g/dL at 7/23/2024  2:01 PM  INR(ratio): 1.1 at 7/21/2024  1:08 PM  Age at listing (hypothetical): 74 years  Sex: Female at 7/23/2024  2:01 PM      Significant Labs:  CBC:  Recent Labs   Lab 07/29/24 0419 07/30/24  0138   WBC 17.92* 19.15*   HGB 7.3* 7.4*   HCT 23.3* 23.6*   * 158     CMP:  Recent Labs   Lab 07/29/24 0419 07/30/24  0138    136   K 4.0 3.9    108   CO2 15* 16*   * 110   BUN 47* 49*   CREATININE 6.3* 6.3*   CALCIUM 8.9 8.9   ALBUMIN 2.0* 1.9*   ANIONGAP 12 12     PTINR:  No results for input(s): "INR" in the last 48 hours.    Significant Procedures:   Dobutamine Stress Test with Color Flow: No results found for this or any previous visit.  "

## 2024-07-31 NOTE — HOSPITAL COURSE
7/30/24: Participated w/ OT.  Bed mob totalA x 2ppl. Functional Mobility: Pt able to tolerate sitting EOB for ~30 minutes, initially requiring total assist however progressing to mod-max assist with verbal and tactile cueing with some brief moments of CGA. Pt displaying slouched posturing, poor trunk control, and a R posterolateral lean. Pt requiring mod-max verbal and physical cueing to WB through BUE to compensate for poor trunk control; however, when cued to engage core, pt able to lean forward and sustain upright sitting. Pt declining attempt to stand this session secondary to fatigue.

## 2024-07-31 NOTE — CARE UPDATE
Interventional Nephrology:     Ms. Reynaga is scheduled tomorrow for exchange of her cuffed tunneled HD catheter. Upon reviewing her labs her WBC is increasing and it is 21K/uL. Will exchange the LT IJ tomorrow to Lt IJ temporary line. Will remove the femoral line.     Once the WBC improves will place the cuffed tunneled cuffed HD catheter.      Please hold Heparin after 10:00 Pm. Tonight. Keep the patient NPO. Send PT and PTT 5:00 Am tomorrow.     PENNY MOLINA.Alexandro. MD. ALISSA. JORDY.  , [employer] [facility] [employer]  Nephrology Consultant. [facility].   [address]. [address]th [place] [address].    email: [email or web address].  Tel: Office: [phone]

## 2024-07-31 NOTE — CONSULTS
[facility] ([place] [id number])  Physical Medicine & Rehab  Consult Note    Patient Name: Juhi Taylor  MRN: [id number]  Admission Date: 7/3/2024  Hospital Length of Stay: 28 days  Attending Physician: Bayron Roy*     Inpatient consult to Physical Medicine & Rehabilitation  Consult performed by: Liza Jeffries NP  Consult requested by:  Bayron Roy*    Collaborating Physician: Savannah Roa MD  Reason for Consult:  Assess rehabilitation needs      Consults  Subjective:     Principal Problem: Clostridium difficile infection    HPI: Juhi Taylor is a 74-year-old female with PMHx of CKD, RCC s/p right nephrectomy, T2DM, obesity, CAD s/p PCI, HF. Patient presented to OSH for hypervolemia in the setting of CKD 4-5 for which she had undergone elective PD catheter placement complicated by catheter site leaking. Hospital course complicated by beginning lasix gtt without improvement and later underwent placement of tunneled line for HD with volume removal. During her second dialysis session she went into PEA arrest for which she was resuscitated, intubated and transferred to ICU. She was noted to have elevated troponin and subsequent TTE found reduced EF from 40-45% to 20-25% for which coronary angiography was recommended. Also complicated by dialysis line infection, significant bleeding around the catheter site, bradycardia and subsequent atrial fibrillation with RVR. Discussed at OSH and decided [place] [facility] [place]. On 7/8 began urgent HD and remained in Afib transitioned to PO Amio. Extubated 7/16. Continues on Heparin gtt for DVT. Discussion with palliative care and made DNR 7/8. Also, found to be Cdiff positive 7/21 and started PO vanc and ID changed to Fidaxomicin x 14 days due to lack of response on Vanc. CT with Prominent wall thickening of the transverse colon and descending colon most prominent at the proximal transverse colon. GI  consulted and pt not a candidate for fecal transplant currently.     Functional History: Patient lives with daughter in a single story home. Prior to admission, Nicolás. DME: rollator.     Hospital Course:   7/30/24: Participated w/ OT.  Bed mob totalA x 2ppl. Functional Mobility: Pt able to tolerate sitting EOB for ~30 minutes, initially requiring total assist however progressing to mod-max assist with verbal and tactile cueing with some brief moments of CGA. Pt displaying slouched posturing, poor trunk control, and a R posterolateral lean. Pt requiring mod-max verbal and physical cueing to WB through BUE to compensate for poor trunk control; however, when cued to engage core, pt able to lean forward and sustain upright sitting. Pt declining attempt to stand this session secondary to fatigue.     Past Medical History:   Diagnosis Date    Anticoagulant long-term use     Arthritis     Breast cancer 2014    invasive lobular carcinoma    Cancer of kidney 11/2020    RIGHT KIDNEY CANCER    CHF (congestive heart failure)     Coronary artery disease dx 2005    Depression     Diabetes mellitus     Diastolic heart failure secondary to hypertension     Gout     Hyperlipemia     Hypertension     Hypertrophy of nasal turbinates     Kidney mass 2020    Right    Levoscoliosis     Lung nodule     left    Multiple thyroid nodules     NICOLE (obstructive sleep apnea)     uses C-PAP    Pulmonary hypertension     Severe sepsis 07/01/2024     Past Surgical History:   Procedure Laterality Date    AORTOGRAPHY N/A 12/04/2020    Procedure: Aortogram;  Surgeon: Paul Pedersen MD;  Location: [facility] CATH;  Service: Cardiology;  Laterality: N/A;    BREAST SURGERY      BRONCHOSCOPY N/A 7/15/2024    Procedure: BRONCHOSCOPY, FLEXIBLE;  Surgeon: Tanya Beckham MD;  Location: [facility] OR [address] [place] [address];  Service: General;  Laterality: N/A;    CARDIAC CATHETERIZATION  12/2020    CHOLECYSTECTOMY      COLONOSCOPY      multi -last 2014     CORONARY ARTERY BYPASS  "GRAFT      ESOPHAGOGASTRODUODENOSCOPY      2012     HAND SURGERY Right     INSERTION OF CATHETER N/A 6/23/2024    Procedure: Insertion,catheter;  Surgeon: Meli Griffin MD;  Location: [facility] OR;  Service: Vascular;  Laterality: N/A;  ORIGINAL CATHETER WAS REPOSITIONED.  NO NEW CATHETER WAS PLACED    INSERTION OF TUNNELED CENTRAL VENOUS HEMODIALYSIS CATHETER Right 7/11/2024    Procedure: Insertion, Catheter, Central Venous, Hemodialysis;  Surgeon: Hawa Landa MD;  Location: [facility] CATH LAB;  Service: Interventional Nephrology;  Laterality: Right;    INSERTION, CATHETER, DIALYSIS, PERITONEAL N/A 6/4/2024    Procedure: IN Insertion Catheter, Dialysis, Peritoneal - laparoscopic;  Surgeon: Rodriguez Catalan Jr., MD;  Location: [facility];  Service: General;  Laterality: N/A;    LAPAROSCOPIC ROBOT-ASSISTED SURGICAL REMOVAL OF KIDNEY USING DA CHELLE XI Right 03/10/2022    Procedure: XI ROBOTIC NEPHRECTOMY- radical;  Surgeon: Rolando Ramirez MD;  Location: [facility] OR;  Service: Urology;  Laterality: Right;    MASTECTOMY W/ SENTINEL NODE BIOPSY Bilateral 01/21/2015    bilateral "dog ears"    NASAL SINUS SURGERY  2015    Dr Bryant FESS/cauterization turbinate     PARTIAL HYSTERECTOMY  1989    PERCUTANEOUS TRANSLUMINAL BALLOON ANGIOPLASTY OF CORONARY ARTERY  12/04/2020    Procedure: Angioplasty-coronary;  Surgeon: Paul Pedersen MD;  Location: [facility] CATH;  Service: Cardiology;;    PERITONEAL CATHETER REMOVAL N/A 7/15/2024    Procedure: REMOVAL, CATHETER, DIALYSIS, PERITONEAL;  Surgeon: Tanya Beckham MD;  Location: [address];  Service: General;  Laterality: N/A;    REMOVAL OF HEMODIALYSIS CATHETER  7/11/2024    Procedure: REMOVAL, CATHETER, HEMODIALYSIS;  Surgeon: Hawa Landa MD;  Location: [facility] CATH LAB;  Service: Interventional Nephrology;;    RENAL BIOPSY Right     9/20/2021 EJ    TUBAL LIGATION      ULTRASOUND GUIDANCE  12/04/2020    Procedure: Ultrasound Guidance;  Surgeon: Paul Pedersen MD;  " [facility];  Service: Cardiology;;     Review of patient's allergies indicates:   Allergen Reactions    Percocet [oxycodone-acetaminophen] Itching    Januvia [sitagliptin]     Jardiance [empagliflozin]      Leg cramps    Lipitor [atorvastatin] Other (See Comments)     Severe leg pain    Linaclotide Other (See Comments) and Nausea And Vomiting     Does not remember    Lubiprostone Other (See Comments) and Palpitations     Does not remember       Scheduled Medications:    cholestyramine  1 packet Oral BID    duke's soln (benadryl 30 mL, mylanta 30 mL, LIDOcaine 30 mL, nystatin 30 mL) 120 mL  10 mL Oral QID    famotidine  20 mg Oral Daily    ferrous sulfate  1 tablet Oral Daily    fidaxomicin  200 mg Oral BID    LIDOcaine  2 patch Transdermal Q24H    metoprolol tartrate  50 mg Oral BID    miconazole NITRATE 2 %   Topical (Top) BID    sacubitriL-valsartan  1 tablet Oral BID       PRN Medications:   Current Facility-Administered Medications:     0.9%  NaCl infusion (for blood administration), , Intravenous, Q24H PRN    acetaminophen, 650 mg, Oral, Q6H PRN    dextromethorphan-guaiFENesin  mg/5 ml, 5 mL, Oral, Q4H PRN    dextrose 10%, 12.5 g, Intravenous, PRN    dextrose 10%, 25 g, Intravenous, PRN    diphenhydrAMINE, 25 mg, Oral, Q6H PRN    glucagon (human recombinant), 1 mg, Intramuscular, PRN    glucose, 16 g, Oral, PRN    glucose, 24 g, Oral, PRN    heparin (PORCINE), 60 Units/kg (Adjusted), Intravenous, PRN    heparin (PORCINE), 30 Units/kg (Adjusted), Intravenous, PRN    hyoscyamine, 0.125 mg, Sublingual, Q4H PRN    insulin aspart U-100, 0-10 Units, Subcutaneous, QID (AC + HS) PRN    metoprolol, 5 mg, Intravenous, Q6H PRN    midodrine, 10 mg, Oral, Daily PRN    naloxone, 0.02 mg, Intravenous, PRN    ondansetron, 4 mg, Intravenous, Q6H PRN    simethicone, 1 tablet, Oral, TID PRN    sodium chloride 0.9%, 250 mL, Intravenous, PRN    sodium chloride 0.9%, 10 mL, Intravenous, PRN    traMADoL, 50 mg, Oral,  Q8H PRN    Family History       Problem Relation (Age of Onset)    Asthma Daughter    Birth defects Daughter    Breast cancer Mother, Maternal Aunt    Depression Daughter    Drug abuse Daughter, Daughter    Glaucoma Sister    Hepatitis Brother    Hypertension Father    Learning disabilities Daughter    Mental illness Daughter    Stroke Father          Tobacco Use    Smoking status: Former     Current packs/day: 0.00     Types: Cigarettes     Start date: 2016     Quit date: 2016     Years since quittin.5    Smokeless tobacco: Never    Tobacco comments:     quit    Substance and Sexual Activity    Alcohol use: No    Drug use: No    Sexual activity: Not Currently     Partners: Male     Birth control/protection: None     Review of Systems   Constitutional:  Positive for activity change and fatigue.   Neurological:  Positive for weakness.     Objective:     Vital Signs (Most Recent):  Temp: 98 °F (36.7 °C) (24 1140)  Pulse: 107 (24 1140)  Resp: 18 (24 1140)  BP: (!) 98/57 (24 1140)  SpO2: (!) 93 % (24 1140)    Vital Signs (24h Range):  Temp:  [98 °F (36.7 °C)-98.7 °F (37.1 °C)] 98 °F (36.7 °C)  Pulse:  [] 107  Resp:  [18-20] 18  SpO2:  [91 %-97 %] 93 %  BP: ()/(45-67) 98/57     Body mass index is 44.37 kg/m².     Physical Exam  Vitals and nursing note reviewed.   Constitutional:       Appearance: She is obese.      Comments: Drowsy difficult to arouse   HENT:      Nose: Nose normal.      Mouth/Throat:      Mouth: Mucous membranes are moist.   Pulmonary:      Effort: Pulmonary effort is normal. No respiratory distress.   Skin:     General: Skin is warm.   Neurological:      Motor: Weakness present.      Gait: Gait abnormal.      Comments: Drowsy   Unable to arouse to complete exam     Diagnostic Results:   Labs: Reviewed  ECG: Reviewed  CT: Reviewed    Assessment/Plan:     * Clostridium difficile infection  - found to be Cdiff positive  and started PO  vanc and ID changed to Fidaxomicin x 14 days due to lack of response on Vanc.   - CT with Prominent wall thickening of the transverse colon and descending colon most prominent at the proximal transverse colon.   - GI consulted and pt not a candidate for fecal transplant currently.     Acute deep vein thrombosis (DVT) of femoral vein of left lower extremity  - continues on Heparin drip    Debility  -  Encourage mobility, OOB in chair at least 3 hours per day, and early ambulation as appropriate  -  PT/OT treat  -  Monitor for and prevent skin breakdown and pressure ulcers  -  Reviewed discharge options (IP rehab and SNF)     ESRD (end stage renal disease)  - continues with HD  - plan for TDC placement on Thursday 8/1        The PM&R team has reviewed this patient's ongoing medical case including inpatient diagnosis, medical history, clinical examination, labs, vitals, current social and functional history. We will continue to follow for improvement with PT & OT, final HD plan, transition off of Heparin drip, and improvement in diarrhea.     Thank you for your consult.       Liza Jeffries NP  Department of Physical Medicine & Rehab  [facility] ([place]-[facility])

## 2024-07-31 NOTE — HPI
Juhi Taylor is a 74-year-old female with PMHx of CKD, RCC s/p right nephrectomy, T2DM, obesity, CAD s/p PCI, HF. Patient presented to OSH for hypervolemia in the setting of CKD 4-5 for which she had undergone elective PD catheter placement complicated by catheter site leaking. Hospital course complicated by beginning lasix gtt without improvement and later underwent placement of tunneled line for HD with volume removal. During her second dialysis session she went into PEA arrest for which she was resuscitated, intubated and transferred to ICU. She was noted to have elevated troponin and subsequent TTE found reduced EF from 40-45% to 20-25% for which coronary angiography was recommended. Also complicated by dialysis line infection, significant bleeding around the catheter site, bradycardia and subsequent atrial fibrillation with RVR. Discussed at OSH and [place] [facility] [place] On 7/8 began urgent HD and remained in Afib transitioned to PO Amio. Extubated 7/16. Continues on Heparin gtt for DVT. Discussion with palliative care and made DNR 7/8. Also, found to be Cdiff positive 7/21 and started PO vanc and ID changed to Fidaxomicin x 14 days due to lack of response on Vanc. CT with Prominent wall thickening of the transverse colon and descending colon most prominent at the proximal transverse colon. GI consulted and pt not a candidate for fecal transplant currently.     Functional History: Patient lives with daughter in a single story home. Prior to admission, Nicol[place]. DME: rollator.

## 2024-07-31 NOTE — SUBJECTIVE & OBJECTIVE
Interval History: No acute events. Hb stable  Afebrile but increasing leukocytosis  Wound care noted small wound lateral R leg,obtained cultures  Interventional nephrology will place     Review of Systems  Objective:     Vital Signs (Most Recent):  Temp: 98 °F (36.7 °C) (07/31/24 1140)  Pulse: 107 (07/31/24 1140)  Resp: 18 (07/31/24 1140)  BP: (!) 98/57 (07/31/24 1140)  SpO2: (!) 93 % (07/31/24 1140) Vital Signs (24h Range):  Temp:  [98 °F (36.7 °C)-98.7 °F (37.1 °C)] 98 °F (36.7 °C)  Pulse:  [] 107  Resp:  [18-20] 18  SpO2:  [91 %-97 %] 93 %  BP: ()/(45-67) 98/57     Weight: 128.5 kg (283 lb 4.7 oz)  Body mass index is 44.37 kg/m².    Intake/Output Summary (Last 24 hours) at 7/31/2024 1343  Last data filed at 7/31/2024 1258  Gross per 24 hour   Intake 222 ml   Output 300 ml   Net -78 ml      Physical Exam  Constitutional:       General: She is not in acute distress.     Appearance: Normal appearance. She is well-developed. She is not ill-appearing or diaphoretic.   HENT:      Head: Normocephalic and atraumatic.      Right Ear: External ear normal.      Left Ear: External ear normal.      Nose: Nose normal.   Eyes:      General: No scleral icterus.        Right eye: No discharge.         Left eye: No discharge.      Extraocular Movements: Extraocular movements intact.      Conjunctiva/sclera: Conjunctivae normal.   Cardiovascular:      Rate and Rhythm: Tachycardia present.   Pulmonary:      Effort: Pulmonary effort is normal. No respiratory distress.      Breath sounds: No stridor.   Abdominal:      Palpations: Abdomen is soft.      Tenderness: There is abdominal tenderness.      Comments: Former PD site c/d/i   Musculoskeletal:         General: Normal range of motion.   Skin:     General: Skin is dry.      Findings: No erythema or rash.   Neurological:      General: No focal deficit present.      Mental Status: She is alert and oriented to person, place, and time. Mental status is at baseline.       "Cranial Nerves: No cranial nerve deficit.   Psychiatric:         Mood and Affect: Mood normal.         Behavior: Behavior normal.         Thought Content: Thought content normal.         Judgment: Judgment normal.         MELD 3.0: 20 at 7/23/2024  2:01 PM  MELD-Na: 21 at 7/23/2024  2:01 PM  Calculated from:  Serum Creatinine: On dialysis. Using the maximum value.  Serum Sodium: 140 mmol/L (Using max of 137 mmol/L) at 7/23/2024  2:01 PM  Total Bilirubin: 0.8 mg/dL (Using min of 1 mg/dL) at 7/23/2024  4:02 AM  Serum Albumin: 1.8 g/dL at 7/23/2024  2:01 PM  INR(ratio): 1.1 at 7/21/2024  1:08 PM  Age at listing (hypothetical): 74 years  Sex: Female at 7/23/2024  2:01 PM      Significant Labs:  CBC:  Recent Labs   Lab 07/30/24  0138 07/31/24  0709   WBC 19.15* 21.43*   HGB 7.4* 7.5*   HCT 23.6* 24.8*    204     CMP:  Recent Labs   Lab 07/30/24  0138 07/31/24  0709    135*   K 3.9 4.5    108   CO2 16* 14*    94   BUN 49* 55*   CREATININE 6.3* 6.5*   CALCIUM 8.9 8.9   ALBUMIN 1.9* 1.7*   ANIONGAP 12 13     PTINR:  No results for input(s): "INR" in the last 48 hours.    Significant Procedures:   Dobutamine Stress Test with Color Flow: No results found for this or any previous visit.  " Jonathan Walsh MD

## 2024-07-31 NOTE — PROGRESS NOTES
[facility]     Patient currently on hemodialysis for removal of uremic toxins and volume.     Patient seen and evaluated on hemodialysis, tolerating treatment, see HD flowsheet for vitals and assessments.     Labs have been reviewed and the dialysate bath has been adjusted.        Assessment/Plan:     -Patient seen on HD, tolerating treatment well, w/o complaints   -Plan for catheter exchange with interventional nephrology tomorrow, 8/1.   -Patient is hypotensive, however appears fluid overloaded with bilateral pitting edema. Midodrine 10 mg ordered PRN with HD, will give dose and attempt 1-1.5 L net UF as tolerated.   -Will plan for longer HD session today to achieve better metabolic clearance  -Hgb goal 10-11, hgb 7.5 Patient had received iron replacement, however in the setting of active infection (C. Diff) would hold iron at this time, may be repleted at OP unit.   -Will plan for additional HD tomorrow for clearance and volume management, will plan for Midodrine at the start of treatment for better BP support.   -Phos 5.4, slightly elevated, not currently on binders. Will trend.   -CoCa mildly elevated at 10.7, notably albumin has been downtrending, now 1.7. Received albumin 25 g q 8 hr x 3 days ending 7/25. Would recommend increase oral protein, Novasource supplementation. Will trend Ca.   -Renal diet, if not NPO   -Strict I/O's and daily weights  -Daily renal function panels  -Keep MAP >65 while on HD   -Will continue to follow while inpatient

## 2024-07-31 NOTE — PT/OT/SLP PROGRESS
Occupational Therapy   Treatment    Name: Juhi Taylor  MRN: [id number]  Admitting Diagnosis:  Clostridium difficile infection  16 Days Post-Op    Recommendations:     Discharge Recommendations: Moderate Intensity Therapy  Discharge Equipment Recommendations:  bedside commode, wheelchair, walker, rolling, bath bench, hospital bed, lift device, grab bar  Barriers to discharge:  Other (Comment) (patient requires increased level of assistance)    Assessment:     Juhi Taylor is a 74 y.o. female with a medical diagnosis of Clostridium difficile infection.  She presents with the fallowing performance deficits affecting function are weakness, impaired endurance, impaired self care skills, impaired functional mobility, gait instability, impaired balance, pain, impaired cardiopulmonary response to activity, edema, decreased safety awareness, decreased lower extremity function, decreased upper extremity function, impaired skin, decreased coordination. Patient stated that she was increased pain and not feeling well at all, also nurse (Yissel) stated that patient is being having a low BP and also patient is schedule to have dialysis. Patient needed to be reposition properly in bed in order to eat her meal, assisted nurse with repositioning patient in bed properly. Patient continues to require total A for bed mobility and to be reposition in bed properly. Patient continues to demonstrated limited activity tolerance due to pain and over all weakness 2/2 recent surgical procedure. Patient would benefit from Moderate intensity therapy post acute setting to address over all functional decline with mobility task, endurance, and ADLs in order to return to PLOF.    Rehab Prognosis:  Good and Fair; patient would benefit from acute skilled OT services to address these deficits and reach maximum level of function.       Plan:     Patient to be seen 4 x/week to address the above listed problems via self-care/home management,  therapeutic activities, therapeutic exercises, neuromuscular re-education  Plan of Care Expires: 08/16/24  Plan of Care Reviewed with: patient, daughter    Subjective     Chief Complaint: pain   Patient/Family Comments/goals: to return to PLOF  Pain/Comfort:  Pain Rating 1:  (patient did not rate)  Location - Orientation 1: generalized  Location 1:  (leg,ratum,mouth)  Pain Addressed 1: Reposition, Distraction, Nurse notified  Pain Rating Post-Intervention 1:  (pain not rated)    Objective:     Communicated with: Nurse prior to session.  Patient found supine with pulse ox (continuous), telemetry, pressure relief boots, bowel management system, oxygen, peripheral IV upon OT entry to room.  Co-treated with PT due to patient complexity deficits requiring two skilled therapist to appropriately and safely mobilized patient while facilitating functional task in addition to accommodating for patient's activity tolerance.    General Precautions: Standard, fall, special contact    Orthopedic Precautions:N/A  Braces: N/A  Respiratory Status: Nasal cannula     Occupational Performance:     Bed Mobility:    Patient completed Rolling/Turning to Left with  total assistance of 2 persons   Patient completed Rolling/Turning to Right with total assistance of 2 persons   Patient completed Scooting to HOB with total assistance of 3 persons     Functional Mobility/Transfers:  Patient decline mobility task due to pain and also nurse stated that patient is having low BP    Activities of Daily Living:  Grooming: Setup A to wash face       [facility] 6 Click ADL: 13    Treatment & Education:  Discussed OT POC and progress  Educated patient on the importance to continue to perform exercises in order to reduce stiffness and promote joint mobility and blood flow  Addressed patient's questions and concerns within PABON scope of practice      Patient left HOB elevated with all lines intact, call button in reach, and daughter present    GOALS:    Multidisciplinary Problems       Occupational Therapy Goals          Problem: Occupational Therapy    Goal Priority Disciplines Outcome Interventions   Occupational Therapy Goal     OT, PT/OT Progressing    Description: Goals to be met by: 08/04/24     Patient will increase functional independence with ADLs by performing:    UE Dressing with Modified [place].  LE Dressing with Minimal Assistance.  Grooming while standing at sink with Stand-by Assistance.  Toileting from bedside commode with Minimal Assistance for hygiene and clothing management.   Toilet transfer to bedside commode with Supervision.    DME:  Tub transfer bench is required for pt to return to t/s use with shower chair at present unsuitable with need for mobiltiy greater than pt can safely complete at present.     Patient has a mobility limitation that significantly impairs their ability to participate in one or more mobility related activities of daily living, including toileting. This deficit can be resolved by using a bedside commode. Patient demonstrates mobility limitations that will cause them to be confined to one room at home without bathroom access for up to 30 days. Using a bedside commode will greatly improve the patient's ability to participate in MRADLs.     Ambulation needs TBD on progress.                              Time Tracking:     OT Date of Treatment: 07/31/24  OT Start Time: 1318  OT Stop Time: 1334  OT Total Time (min): 16 min    Billable Minutes:Self Care/Home Management 16    OT/DAYSI: DAYSI     Number of DAYSI visits since last OT visit: 1 7/31/2024

## 2024-07-31 NOTE — PROGRESS NOTES
[facility] - Stepdown Flex ([place] [id number])  [facility] Medicine  Progress Note    Patient Name: Juhi Taylor  MRN: [id number]  Patient Class: IP- Inpatient   Admission Date: 7/3/2024  Length of Stay: 28 days  Attending Physician: Bayron Roy*  Primary Care Provider: Tony Sadler MD        Subjective:     Principal Problem:Clostridium difficile infection        HPI:  Juhi Taylor is a 74-year-old female with a past medical history of CKD stage 5 with recent PD catheter placement on 6/4, RCC s/p right nephrectomy, T2DM, obesity, CAD s/p PCI to Lcx and LAD in 11/2020 and HFmREF who is admitted as a transfer from [facility] for higher level of care and further cardiac evaluation. Nephrology consulted for hemodialysis. She initially presented on 6/18/24 to [facility] for hypervolemia in the setting of CKD 4-5 for which she had undergone elective PD catheter placement complicated by catheter site leaking. She had a complex hospital course, was initially placed on furosemide gtt without significant improvement and later underwent placement of tunneled line for HD with volume removal. During her second dialysis session she went into PEA arrest for which she was resuscitated, intubated and transferred to ICU. She was noted to have elevated troponin and subsequent TTE found reduced EF from 40-45% to 20-25% for which coronary angiography was recommended. Her hospital course was also complicated by dialysis line infection, significant bleeding around the catheter site, bradycardia and subsequent atrial fibrillation with RVR. Given the catheter site bleeding, anticoagulation was temporarily held. She has reported allergy to amiodarone, was initially placed on diltiazem for atrial fibrillation despite reduced EF. She discussed GOC at outside hospital where it appears that she lacked capacity per note and decided to pursue higher level of [place] [facility] [place]. At that time, gen surgery was planning to place a left  tunneled catheter due to concerns of the right side not being suitable     Upon admit, patient appeared drowsy but conversant. Daughter reports that she typically gets lethargic 2/2 to the volume overload due to not receiving dialysis. Patient reported b/l left lower extremity pain. Daughter concerned about her not having dialysis in 4 days and wants a session today.      Pt initially refusing dialysis. Seen by nephrology. VBG 7.220/33.4/34/13.7/-14. Critical care medicine consulted for emergent trialysis line placement and dialysis.     Overview/Hospital Course:  Ms. Taylor was admitted to MICU on 7/8 for urgent dialysis line placement and initiated of HD. Patient received short run of CRRT overnight without issue. Encephalopathy improving with continued CRRT treatments. Persistent AFib with RVR, received Digoxin loading dose (renally dosed), restarted PO Carvedilol. Patient remains in persistent AFib RVR despite therapeutic digoxin level and uptitration of Carvedilol levels, started on Amiodarone infusion overnight. Transitioned to amio PO, started on heparin gtt for afib AC. Tolerated HD well overnight without complications and less encephalopathic upon exam. Planning for gen surg to remove PD catheter. Upon induction pt vomited copious amounts of bile, was intubated. PD cathter removed successfully. Extubated to NC 7/16.      7/18:  Patient made DNR after Palliative Care discussions yesterday.  Tolerated SLED 12 hours overnight.  Discussed difficulties regarding HD line with Interventional Nephrology and plans to replace it next week if patient it amenable.  Patient was stepped down from ICU.  7/19:  Stepped back up to medical ICU with plans for overnight SLED x 3 days.     On 7/19 Nephrology requested that the patient be stepped back up to the medical ICU for SLED. A temporary trialysis HD catheter was placed on 7/20/24 in the groin. She also began having diarrhea, which tested positive for Clostridium  difficile so she was started on PO vancomycin 7/22/24. HD trial on 7/24; remained HDS. Ready for stepdown.    ID consulted and patient changed to fidaximicin. CT with Prominent wall thickening of the transverse colon and descending colon most prominent at the proximal transverse colon. GI consulted and pt not a candidate for fecal transplant currently.   Interventional nephrology plan to replace tunneled line on 8/1/24    Interval History: No acute events. Hb stable  Afebrile but increasing leukocytosis  Wound care noted small wound lateral R leg,obtained cultures  Interventional nephrology will place     Review of Systems  Objective:     Vital Signs (Most Recent):  Temp: 98 °F (36.7 °C) (07/31/24 1140)  Pulse: 107 (07/31/24 1140)  Resp: 18 (07/31/24 1140)  BP: (!) 98/57 (07/31/24 1140)  SpO2: (!) 93 % (07/31/24 1140) Vital Signs (24h Range):  Temp:  [98 °F (36.7 °C)-98.7 °F (37.1 °C)] 98 °F (36.7 °C)  Pulse:  [] 107  Resp:  [18-20] 18  SpO2:  [91 %-97 %] 93 %  BP: ()/(45-67) 98/57     Weight: 128.5 kg (283 lb 4.7 oz)  Body mass index is 44.37 kg/m².    Intake/Output Summary (Last 24 hours) at 7/31/2024 1343  Last data filed at 7/31/2024 1258  Gross per 24 hour   Intake 222 ml   Output 300 ml   Net -78 ml      Physical Exam  Constitutional:       General: She is not in acute distress.     Appearance: Normal appearance. She is well-developed. She is not ill-appearing or diaphoretic.   HENT:      Head: Normocephalic and atraumatic.      Right Ear: External ear normal.      Left Ear: External ear normal.      Nose: Nose normal.   Eyes:      General: No scleral icterus.        Right eye: No discharge.         Left eye: No discharge.      Extraocular Movements: Extraocular movements intact.      Conjunctiva/sclera: Conjunctivae normal.   Cardiovascular:      Rate and Rhythm: Tachycardia present.   Pulmonary:      Effort: Pulmonary effort is normal. No respiratory distress.      Breath sounds: No stridor.  "  Abdominal:      Palpations: Abdomen is soft.      Tenderness: There is abdominal tenderness.      Comments: Former PD site c/d/i   Musculoskeletal:         General: Normal range of motion.   Skin:     General: Skin is dry.      Findings: No erythema or rash.   Neurological:      General: No focal deficit present.      Mental Status: She is alert and oriented to person, place, and time. Mental status is at baseline.      Cranial Nerves: No cranial nerve deficit.   Psychiatric:         Mood and Affect: Mood normal.         Behavior: Behavior normal.         Thought Content: Thought content normal.         Judgment: Judgment normal.         MELD 3.0: 20 at 7/23/2024  2:01 PM  MELD-Na: 21 at 7/23/2024  2:01 PM  Calculated from:  Serum Creatinine: On dialysis. Using the maximum value.  Serum Sodium: 140 mmol/L (Using max of 137 mmol/L) at 7/23/2024  2:01 PM  Total Bilirubin: 0.8 mg/dL (Using min of 1 mg/dL) at 7/23/2024  4:02 AM  Serum Albumin: 1.8 g/dL at 7/23/2024  2:01 PM  INR(ratio): 1.1 at 7/21/2024  1:08 PM  Age at listing (hypothetical): 74 years  Sex: Female at 7/23/2024  2:01 PM      Significant Labs:  CBC:  Recent Labs   Lab 07/30/24  0138 07/31/24  0709   WBC 19.15* 21.43*   HGB 7.4* 7.5*   HCT 23.6* 24.8*    204     CMP:  Recent Labs   Lab 07/30/24  0138 07/31/24  0709    135*   K 3.9 4.5    108   CO2 16* 14*    94   BUN 49* 55*   CREATININE 6.3* 6.5*   CALCIUM 8.9 8.9   ALBUMIN 1.9* 1.7*   ANIONGAP 12 13     PTINR:  No results for input(s): "INR" in the last 48 hours.    Significant Procedures:   Dobutamine Stress Test with Color Flow: No results found for this or any previous visit.    Assessment/Plan:      * Clostridium difficile infection  Diagnosed 7/22/24.  Worsening leukocytosis and persistent diarrhea on PO vancomycin  Rectal tube placed  ID consulted  Stop p.o. vancomycin and start fidaxomicin  CT abd/pelvis ordered to eval for worsening colitis  Cholestyramine for a " binder of fidaxomicin  GI consult as patient may need fecal transplant in the future - not candidate currently      Acute deep vein thrombosis (DVT) of femoral vein of left lower extremity  LE venous doppler identified nonocclusive thrombus within the mid and distal femoral vein.   Cont heparin gtt.       Diarrhea  +C.diff,       Debility  PT/OT    Palliative care encounter  - Palliative care following, continued GOC with family.   - Patient has requested code status changed to DNR      ACP (advance care planning)  --Palliative care consulted, appreciate their assistance in clarification of GOC/POC with patient and family.      Atrial fibrillation with RVR  --AFib with RVR appears new following cardiac arrest at OSH.   --Patient endorses amiodarone reaction with itching, rash, and oral burning. Refused Sotalol, DCCV, and AICD placement at OSH.   --Was placed on Diltizem infusion and transitioned to PO metoprolol. Amio gtt restarted overnight 7/11 s/t uncontrolled afib with RVR. Transitioned to PO amio.  --Patient's rate remained elevated despite amiodarone. Appeared to respond better to metoprolol.   --Continue Toprol 50 mg daily.   --Apixaban on hold. Anticoagulation with a heparin infusion.      Acute on chronic combined systolic and diastolic heart failure  Echo    Left Ventricle: The left ventricle is moderately dilated. Normal wall thickness. Global hypokinesis present. There is severely reduced systolic function with a visually estimated ejection fraction of 20 - 25%.    Right Ventricle: Mild right ventricular enlargement. Wall thickness is normal. Right ventricle wall motion has global hypokinesis. Systolic function is moderately reduced.    Left Atrium: Left atrium is severely dilated.    Right Atrium: Right atrium is severely dilated.    Aortic Valve: The aortic valve is a trileaflet valve. There is mild aortic valve sclerosis.    Mitral Valve: There is mild regurgitation.    Tricuspid Valve: There is  severe regurgitation.    Pulmonary Artery: There is moderate pulmonary hypertension. The estimated pulmonary artery systolic pressure is 61 mmHg.    IVC/SVC: Elevated venous pressure at 15 mmHg.    Pericardium: There is a trivial circumferential effusion. No indication of cardiac tamponade.     - Will require interventional cardiology for LHC vs PET for reduced EF once euvolemic - deferring given pt's illness and co morbidities, infection  - LifeVest prior to discharge  - Continue Entresto 24-26mg BID per cardiology recs    ESRD (end stage renal disease)  Pt previously on PD. Unable to remove enough volume with PD catheter. Initially upgraded to MICU for emergent trialysis placement and CRRT. Now with tunneled dialysis catheter. Tolerated session of HD on 7/12 without complications.      - Nephrology following, appreciate recs  - Strict I/Os  - Renally dose meds  - Avoid nephrotoxic agents  - PD catheter removed 07/15  - Transferred to MICU for SLED  - Tunneled dialysis catheter is not functioning.   - Trialysis catheter placed in left femoral on 7/20.  - Tolerating iHD  - Received albumin 25 g q8h x 3 days per Nephrology, ending 7/25   - Planning for placement of new tunneled HD catheter - per interventional nephro    Severe obesity (BMI >= 40)  Body mass index is 44.37 kg/m². Morbid obesity complicates all aspects of disease management from diagnostic modalities to treatment. Weight loss encouraged and health benefits explained to patient.         Type 2 diabetes mellitus with microalbuminuria, without long-term current use of insulin  --Latest A1C 6.8; takes metformin at home.   --Target -180. Within goal.   --Continue to monitor.       Essential hypertension  --Takes carvedilol + clonidine at home.   --Hold antihypertensives due to hypotension.   --Continue metoprolol for rate control.       VTE Risk Mitigation (From admission, onward)           Ordered     heparin 25,000 units in dextrose 5% (100  units/ml) IV bolus from bag LOW INTENSITY nomogram - [facility]  As needed (PRN)        Question:  Heparin Infusion Adjustment (DO NOT MODIFY ANSWER)  Answer:  [email or web address]\epic\Images\Pharmacy\HeparinInfusions\heparin LOW INTENSITY nomogram for [facility] [email or web address]    07/21/24 1236     heparin 25,000 units in dextrose 5% (100 units/ml) IV bolus from bag LOW INTENSITY nomogram - [facility]  As needed (PRN)        Question:  Heparin Infusion Adjustment (DO NOT MODIFY ANSWER)  Answer:  [email or web address]\epic\Images\Pharmacy\HeparinInfusions\heparin LOW INTENSITY nomogram for [facility] [id number]    07/21/24 1236     heparin 25,000 units in dextrose 5% 250 mL (100 units/mL) infusion LOW INTENSITY nomogram - [facility]  Continuous        Question:  Begin at (units/kg/hr)  Answer:  12    07/21/24 1236     heparin 25,000 units in dextrose 5% 250 mL (100 units/mL) infusion (heparin infusion - NO NOMOGRAM)  Continuous         07/20/24 1217     Place sequential compression device  Until discontinued         07/15/24 1330     IP VTE HIGH RISK PATIENT  Once         07/15/24 1330                    Discharge Planning   HARINI: 8/2/2024     Code Status: DNR   Is the patient medically ready for discharge?:     Reason for patient still in hospital (select all that apply): Patient trending condition, Treatment, and Consult recommendations  Discharge Plan A: Skilled Nursing Facility   Discharge Delays: (!) Procedure Scheduling (IR, OR, Labs, Echo, Cath, Echo, EEG)      Bayron Roy MD  Department of Hospital Medicine   [facility] - Stepdown Flex ([place]

## 2024-07-31 NOTE — CONSULTS
[facility] - Step[facility] ([place] [id number])    Wound Care     Patient Name:  Juhi Taylor  MRN:  [id number]  Date: 2024  Diagnosis: Clostridium difficile infection     History:  Past Medical History:   Diagnosis Date    Anticoagulant long-term use     Arthritis     Breast cancer 2014    invasive lobular carcinoma    Cancer of kidney 2020    RIGHT KIDNEY CANCER    CHF (congestive heart failure)     Coronary artery disease dx     Depression     Diabetes mellitus     Diastolic heart failure secondary to hypertension     Gout     Hyperlipemia     Hypertension     Hypertrophy of nasal turbinates     Kidney mass     Right    Levoscoliosis     Lung nodule     left    Multiple thyroid nodules     NICOLE (obstructive sleep apnea)     uses C-PAP    Pulmonary hypertension     Severe sepsis 2024     Social History     Socioeconomic History    Marital status: Single    Number of children: 4   Occupational History    Occupation: retired Psych aid    Tobacco Use    Smoking status: Former     Current packs/day: 0.00     Types: Cigarettes     Start date: 2016     Quit date: 2016     Years since quittin.5    Smokeless tobacco: Never    Tobacco comments:     quit    Substance and Sexual Activity    Alcohol use: No    Drug use: No    Sexual activity: Not Currently     Partners: Male     Birth control/protection: None     Social Determinants of Health     Financial Resource Strain: Low Risk  (2024)    Overall Financial Resource Strain (CARDIA)     Difficulty of Paying Living Expenses: Not very hard   Recent Concern: Financial Resource Strain - Medium Risk (2024)    Overall Financial Resource Strain (CARDIA)     Difficulty of Paying Living Expenses: Somewhat hard   Food Insecurity: No Food Insecurity (2024)    Hunger Vital Sign     Worried About Running Out of Food in the Last Year: Never true     Ran Out of Food in the Last Year: Never true   Recent Concern: Food Insecurity -  Food Insecurity Present (6/20/2024)    Hunger Vital Sign     Worried About Running Out of Food in the Last Year: Sometimes true     Ran Out of Food in the Last Year: Never true   Transportation Needs: No Transportation Needs (7/5/2024)    TRANSPORTATION NEEDS     Transportation : No   Physical Activity: Inactive (7/5/2024)    Exercise Vital Sign     Days of Exercise per Week: 0 days     Minutes of Exercise per Session: 0 min   Stress: Patient Unable To Answer (7/5/2024)    [employer] - Occupational Stress Questionnaire     Feeling of Stress : Patient unable to answer   Housing Stability: Low Risk  (7/5/2024)    Housing Stability Vital Sign     Unable to Pay for Housing in the Last Year: No     Homeless in the Last Year: No     Precautions:  Allergies as of 07/02/2024 - Reviewed 06/23/2024   Allergen Reaction Noted    Percocet [oxycodone-acetaminophen] Itching 03/15/2022    Amiodarone analogues  01/16/2023    Irbesartan Swelling 12/11/2019    Januvia [sitagliptin]  06/28/2021    Jardiance [empagliflozin]  09/14/2020    Lipitor [atorvastatin] Other (See Comments) 12/08/2020    Linaclotide Other (See Comments) and Nausea And Vomiting 08/30/2017    Lubiprostone Other (See Comments) and Palpitations 08/30/2017       [facility] Assessment Details / Treatment:    Patient seen for wound care: New Consult   Chart reviewed for this encounter.   Labs:   WBC (K/uL)   Date Value   07/31/2024 21.43 (H)   07/30/2024 19.15 (H)     Glucose (mg/dL)   Date Value   07/31/2024 94   07/30/2024 110     Albumin (g/dL)   Date Value   07/31/2024 1.7 (L)   07/30/2024 1.9 (L)       Jus Score: 13    Pt is diabetic, anuria, on HD and bowel incontinent, currently has rectal tube in place since 7/28.   Chart review reveals pt has DVT in left leg.    Narrative:  Pt seen for WC consultation / follow-up and agreed to assessment  Chart reviewed for this encounter.   See Flow Sheet for additional documentation and media.    Pt  laying on Immerse bed, settings correct, sister, Amy and RN at bedside.   BLE +4 pitting edema noted.  Pt wearing heel lift boot and foam border on right foot, dressing removed revealing blanchable skin.   Noted pt foam border on RLE saturated with drainage, dressing removed revealing green malodorous drainage, cleansed with NS and 4x4 gauze, recommended culture, MD advised, bedside RN took culture. Three open areas noted proximal wound is 1.5 x 0.5 x 0.2, medial distal 0.6 x 0.2 x 0.2, lateral 0.5 x 0.9 x 0.2, well adherent yellow slough noted to all wound beds.   Noted intact serous filled blister on right posterior lower leg, recommend bedside nursing monitor and do not intentionally rupture.   Right groin previously listed as Intertrigo, appears to be an ulceration with pink granulation tissue, cleansed with Vashe, applied Aquacel AG.   Abdominal wound dressing strike through drainage noted to foam borders, dressings removed revealing previous LP drainage site, wound bed is pale pink with yellow well adherent slough also to the proximal edge of wound bed.   Cleansed with Vashe, applied Aquacel AG rope, covered with foam border.   Distal abdomen, yellow well adherent slough with dark discoloration scar tissue. Cleansed with Vashe, applied Aquacel AG rope, covered with foam border.   Left elbow foam border removed revealing healed area with pink scar tissue and dry skin to warren-wound. Left open to air.     Pt turned with assistance from bedside nurse and sister revealing loose BM leakage from rectal tube area and on pads under pt, cleansed with Coloplast bath wipes revealing  purple/maroon intact fluid filled blister with linear marks to lateral side as well as purple/maroon linear marks to right posterior upper thigh.   Pt was cleansed attempting to remove previous application of white cream.   Coccyx reveals maroon, purple discoloration to wound bed, black edges and red granulation tissue noted to left  aspect of open area.   Left distal area purple/maroon discoloration noted.   Applied Triad.   Placed purple wedge under to offload area. Pt agreed to bilateral heel lift boots not secure.   Educated pt and sister of the importance of turning q2h. Both voiced understanding.     RECOMMENDATIONS:  Bedside nurse assess for acute changes (purulence, increased redness/swelling, increased drainage, malodor, increased pain, pallor, necrosis) please contact physician on any acute changes.    Skin Integrity PABLO, Anaya Wong.  Culture to RLE wound.   Terry all meals  Nutrition consult.     Apply Triad correctly:      Always cleanse wound before applying Triad.  To remove Triad:   Use pH-balanced wound cleanser to soften Triad  Gently wipe without scrubbing  For complete removal, repeat as needed.     Gently spread Triad evenly over the area of application to the thickness of a dime.    Discussed POC with patient and primary nurse.   See EMR for orders & patient education.     Discussed nutrition and the role of protein in wound healing with the patient. Instructed patient to optimize protein for wound healing.     Bedside nursing to continue care & monitoring.  Bedside nursing to maintain pressure injury prevention interventions    Thank you for the consult. Wound Care will continue to follow.       07/31/24 1030   WOCN Assessment   WOCN Total Time (mins) 90   Visit Date 07/31/24   Visit Time 1030   Consult Type New   WOCN Speciality Wound   Wound moisture;pressure   Intervention chart review;assessed;changed;applied;team conference;consult other service;orders   Teaching on-going;complication        Wound 06/27/24 0701 Blister(s) Right Buttocks   Date First Assessed/Time First Assessed: 06/27/24 0701   Primary Wound Type: Blister(s)  Side: Right  Location: Buttocks   Wound Image         Wound 07/20/24 0204 Abrasion(s) Left Elbow   Date First Assessed/Time First Assessed: 07/20/24 0204   Present on Original Admission: No   Primary Wound Type: Abrasion(s)  Side: Left  Location: Elbow  Is this injury device related?: No   Wound Image    Dressing Appearance Clean;Dry;Intact   Drainage Amount None   Drainage Characteristics/Odor No odor   Appearance Intact   Tissue loss description Not applicable   Periwound Area Intact;Dry   Wound Edges Approximated   Wound Length (cm) 0 cm   Wound Width (cm) 0 cm   Wound Depth (cm) 0 cm   Wound Volume (cm^3) 0 cm^3   Wound Surface Area (cm^2) 0 cm^2   Care Cleansed with:;Sterile normal saline   Dressing Removed;Foam        Wound 07/20/24 0207 Abrasion(s) Right Elbow   Date First Assessed/Time First Assessed: 07/20/24 0207   Present on Original Admission: No  Primary Wound Type: Abrasion(s)  Side: Right  Location: Elbow   Wound Length (cm) 0 cm   Wound Width (cm) 0 cm   Wound Depth (cm) 0 cm   Wound Volume (cm^3) 0 cm^3   Wound Surface Area (cm^2) 0 cm^2        Wound 07/25/24 1139 Ulceration Right Groin   Date First Assessed/Time First Assessed: 07/25/24 1139   Primary Wound Type: Ulceration  Side: Right  Location: Groin   Wound Image    Dressing Appearance Open to air   Drainage Amount Small   Drainage Characteristics/Odor Serosanguineous;Yellow   Appearance Pink;Granulating;Staples intact   Tissue loss description Not applicable   Red (%), Wound Tissue Color 100 %   Periwound Area Intact   Wound Edges Defined   Wound Length (cm) 1.5 cm   Wound Width (cm) 3 cm   Wound Depth (cm) 0.2 cm   Wound Volume (cm^3) 0.9 cm^3   Wound Surface Area (cm^2) 4.5 cm^2   Care Cleansed with:;Other (see comments)  (Coloplast bath wipes)   Dressing Applied;Silver;Hydrofiber        Wound 07/25/24 1139 Ulceration Right lower;lateral Leg   Date First Assessed/Time First Assessed: 07/25/24 1139   Primary Wound Type: Ulceration  Side: Right  Orientation: lower;lateral  Location: Leg   Wound Image    Dressing Appearance Intact;Saturated   Drainage Amount Large   Drainage Characteristics/Odor Green;Malodorous   Appearance  Yellow;Slough   Tissue loss description Not applicable   Yellow (%), Wound Tissue Color 100 %   Periwound Area Edematous   Wound Edges Defined   Care Cleansed with:;Sterile normal saline   Dressing Removed;Foam;Applied   Periwound Care Dry periwound area maintained   Dressing Change Due 08/02/24        Wound 07/25/24 1139 Ulceration lower Abdomen   Date First Assessed/Time First Assessed: 07/25/24 1139   Present on Original Admission: Yes  Primary Wound Type: Ulceration  Orientation: lower  Location: Abdomen   Wound Image     Dressing Appearance Moist drainage;Intact   Drainage Amount Moderate   Drainage Characteristics/Odor Serosanguineous;Yellow;No odor   Appearance Yellow;Slough;Intact;Red   Tissue loss description Not applicable   Red (%), Wound Tissue Color 50 %   Yellow (%), Wound Tissue Color 50 %   Wound Edges Defined   Wound Length (cm) 2 cm   Wound Width (cm) 3.5 cm   Wound Depth (cm) 1.5 cm   Wound Volume (cm^3) 10.5 cm^3   Wound Surface Area (cm^2) 7 cm^2   Care Cleansed with:;Antimicrobial agent   Dressing Removed;Applied;Silver;Hydrofiber;Foam   Packing packing removed;hydrofiber/alginate;packed with   Periwound Care Dry periwound area maintained   Dressing Change Due 08/02/24        Wound 07/25/24 1139 Ulceration midline;lower Abdomen   Date First Assessed/Time First Assessed: 07/25/24 1139   Primary Wound Type: Ulceration  Orientation: midline;lower  Location: Abdomen   Wound Image    Dressing Appearance Moist drainage;Intact   Drainage Amount Scant   Drainage Characteristics/Odor Serosanguineous;No odor   Appearance Pink;Yellow;Slough   Tissue loss description Not applicable   Red (%), Wound Tissue Color 80 %   Yellow (%), Wound Tissue Color 20 %   Periwound Area Intact;Dry   Wound Edges Irregular   Wound Length (cm) 0.4 cm   Wound Width (cm) 3 cm   Wound Depth (cm) 0.3 cm   Wound Volume (cm^3) 0.36 cm^3   Wound Surface Area (cm^2) 1.2 cm^2   Care Cleansed with:;Antimicrobial agent   Dressing  Applied;Silver;Hydrofiber;Foam   Periwound Care Dry periwound area maintained   Dressing Change Due 08/02/24        Wound 07/29/24 1134 Ulceration Coccyx   Date First Assessed/Time First Assessed: 07/29/24 1134   Present on Original Admission: No  Primary Wound Type: Ulceration  Location: Coccyx   Wound Image    Dressing Appearance Open to air   Drainage Amount Small   Drainage Characteristics/Odor Sanguineous;No odor   Appearance Pink;Red;Maroon;Purple;Moist   Tissue loss description Not applicable   Periwound Area Moist   Wound Edges Irregular   Care Cleansed with:;Other (see comments)  (Coloplast bath wipes)   Dressing Removed;Applied;Other (comment)  (Triad)   Off Loading Other (see comments)  (Immerse)   Dressing Change Due 07/31/24        Wound 07/31/24 1030 Blister(s) Right posterior;lower Calf   Date First Assessed/Time First Assessed: 07/31/24 1030   Present on Original Admission: No  Primary Wound Type: Blister(s)  Side: Right  Orientation: posterior;lower  Location: Calf   Wound Image    Dressing Appearance Open to air   Drainage Amount None   Appearance Intact;Other (see comments)  (fluid filled blister)   Tissue loss description Not applicable   Periwound Area Intact;Edematous;Swelling   Wound Length (cm) 10 cm   Wound Width (cm) 3 cm   Wound Surface Area (cm^2) 30 cm^2

## 2024-07-31 NOTE — NURSING
3.5 hours HD tx completed. No fluid removed due to low BP during dialysis.   MARKOS Gabriel PA-C, aware.     Patient tolerated well.    Blood returned. Lines flushed with NS. Clamped and capped.    Report given to CHANA Graham RN.

## 2024-07-31 NOTE — PT/OT/SLP PROGRESS
"Physical Therapy Co Treatment    Patient Name:  Juhi Taylor   MRN:  [id number]    Recommendations:     Discharge Recommendations: Moderate Intensity Therapy  Discharge Equipment Recommendations: bedside commode, wheelchair, walker, rolling, bath bench, grab bar, hospital bed  Barriers to discharge: Inaccessible home and Decreased caregiver support    Assessment:     Juhi Taylor is a 74 y.o. female admitted with a medical diagnosis of Clostridium difficile infection.  She presents with the following impairments/functional limitations: weakness, impaired endurance, impaired functional mobility, gait instability, impaired balance, decreased safety awareness, decreased lower extremity function, decreased upper extremity function, decreased coordination, impaired self care skills, pain, decreased ROM, edema, impaired skin, impaired cardiopulmonary response to activity. Pt was having increased discomfort today after having multiple disciplines in her room today. Pt continues to have minimal independence and requires total A x2.     Co-treatment performed due to patient's multiple deficits requiring two skilled therapists to appropriately and safely assess patient's strength and endurance while facilitating functional tasks in addition to accommodating for patient's activity tolerance.    Rehab Prognosis: Good; patient would benefit from acute skilled PT services to address these deficits and reach maximum level of function.    Recent Surgery: Procedure(s) (LRB):  REMOVAL, CATHETER, DIALYSIS, PERITONEAL (N/A)  BRONCHOSCOPY, FLEXIBLE (N/A) 16 Days Post-Op    Plan:     During this hospitalization, patient to be seen 4 x/week to address the identified rehab impairments via gait training, neuromuscular re-education, therapeutic activities, therapeutic exercises and progress toward the following goals:    Plan of Care Expires:  08/12/24    Subjective     Chief Complaint: "Not today"  Patient/Family " Comments/goals: none verbalized  Pain/Comfort:  Pain Rating 1:  (not rated)  Location - Orientation 1: generalized  Location 1:  (legs, mouth, rectum)  Pain Addressed 1: Reposition, Distraction, Nurse notified      Objective:     Communicated with nursing (Yissel) prior to session.  Patient found supine with peripheral IV, bowel management system, telemetry, pulse ox (continuous), oxygen, SCD upon PT entry to room.     General Precautions: Standard, fall, special contact  Orthopedic Precautions: N/A  Braces: N/A  Respiratory Status: Nasal cannula, flow 2 L/min     Functional Mobility:  Bed Mobility:   PTA/RN/PABON performed bed mobility for total A  Rolling Left:  total assistance and of 3 persons  Scooting: total assistance and of 3 persons with draw sheet to HOB  Patient performed 1 set(s) of 10 repetitions of  the following bed level exercises: ankle pumps and heel slides (minimal knee flexion) for bilateral LE to improve LE circulation, ROM and functional mobility.      AM-PAC 6 CLICK MOBILITY  Turning over in bed (including adjusting bedclothes, sheets and blankets)?: 2  Sitting down on and standing up from a chair with arms (e.g., wheelchair, bedside commode, etc.): 1  Moving from lying on back to sitting on the side of the bed?: 2  Moving to and from a bed to a chair (including a wheelchair)?: 1  Need to walk in hospital room?: 1  Climbing 3-5 steps with a railing?: 1  Basic Mobility Total Score: 8       Treatment & Education:  Patient provided with daily orientation and goals of this PT session.  Encouraged patient to perform daily exercises & mobility to increase endurance and decrease effects of bedrest. Time provided for therapeutic counseling and discussion of health disposition. All questions answered to patient's satisfaction, within scope of PT practice; voiced no other concerns. White board updated in patient's room, RN notified of session.    Patient left HOB elevated with all lines intact, call  button in reach, and family present..    GOALS:   Multidisciplinary Problems       Physical Therapy Goals          Problem: Physical Therapy    Goal Priority Disciplines Outcome Goal Variances Interventions   Physical Therapy Goal     PT, PT/OT Progressing     Description: PT goals to be met by: 8/15/24    Patient will perform rolling each way with minimum assistance  Patient will perform supine <> sitting with minimum assistance  Patient will perform sit <> stand transitions with maximal assistance  Patient will perform transfers from bed <> chair or BSC with maximal assistance using LRAD  Patient will perform standing for 60 seconds with maximal assistance using RW or LRAD    DME Justifications (see above for complete DME recommendations)    Bedside Commode- Patient has a mobility limitation that significantly impairs their ability to participate in one or more mobility related activities of daily living, including toileting. This deficit can be resolved by using a bedside commode. Patient demonstrates mobility limitations that will cause them to be confined to one room at home without bathroom access for up to 30 days. Using a bedside commode will greatly improve the patient's ability to participate in MRADLs.     Rolling Walker- Patient demonstrates a mobility limitation that significantly impairs their ability to participate in one or more mobility related activities of daily living. Patient's mobility limitation cannot be sufficiently resolved with the use of a cane, but can be sufficiently resolved with the use of a rolling walker.The use of a rolling walker will considerably improve their ability to participate in MRADLs. Patient will use the walker on a regular basis at home.      Wheelchair-  Patient has a mobility limitation that significantly impairs their ability to participate in one or more mobility related activities of daily living in customary locations in the home. The mobility limitation cannot  be sufficiently resolved by the use of a cane or walker. The use of a manual wheelchair will greatly improve the patient's ability to participate in MRADLs. The patient will use the wheelchair on a regular basis at home. They have expressed their willingness to use a manual wheelchair in the home, and have a caregiver who is available and willing to assist with the wheelchair if needed.     Hospital Bed ·       Patient requires a hospital bed for positioning of the body in ways that are not feasible with an ordinary bed. The patient requires special positioning for pain relief, limited mobility, and/or being unable to independently make changes in body position without the use of a hospital bed. Pillows and wedges will not be adequate for resolving these positional issues.                         Time Tracking:     PT Received On: 07/31/24  PT Start Time: 1318     PT Stop Time: 1334  PT Total Time (min): 16 min     Billable Minutes: Therapeutic Activity 8    Treatment Type: Treatment  PT/PTA: PTA     Number of PTA visits since last PT visit: 3     07/31/2024

## 2024-07-31 NOTE — NURSING
Patient arrived in a bed to dialysis unit. AAOx4    Report received from CHANA Graham RN.      ISO Hemodialysis initiated using the following:  Dialysis Access: Left Femoral Catheter.     UF goal 500 mL as tolerated    Patient arrived with Heparin  infusing at 14 Unit/Kg/hr at 12.5 mL/hr.

## 2024-07-31 NOTE — ASSESSMENT & PLAN NOTE
Pt previously on PD. Unable to remove enough volume with PD catheter. Initially upgraded to MICU for emergent trialysis placement and CRRT. Now with tunneled dialysis catheter. Tolerated session of HD on 7/12 without complications.      - Nephrology following, appreciate recs  - Strict I/Os  - Renally dose meds  - Avoid nephrotoxic agents  - PD catheter removed 07/15  - Transferred to MICU for SLED  - Tunneled dialysis catheter is not functioning.   - Trialysis catheter placed in left femoral on 7/20.  - Tolerating iHD  - Received albumin 25 g q8h x 3 days per Nephrology, ending 7/25   - Planning for placement of new tunneled HD catheter - per interventional nephro

## 2024-07-31 NOTE — ASSESSMENT & PLAN NOTE
- found to be Cdiff positive 7/21 and started PO vanc and ID changed to Fidaxomicin x 14 days due to lack of response on Vanc.   - CT with Prominent wall thickening of the transverse colon and descending colon most prominent at the proximal transverse colon.   - GI consulted and pt not a candidate for fecal transplant currently.

## 2024-07-31 NOTE — ASSESSMENT & PLAN NOTE
-  Encourage mobility, OOB in chair at least 3 hours per day, and early ambulation as appropriate  -  PT/OT treat  -  Monitor for and prevent skin breakdown and pressure ulcers  -  Reviewed discharge options (IP rehab and SNF)

## 2024-07-31 NOTE — PROGRESS NOTES
Please see previous notes from this SW for continuity.    __________________________________________________  SW contacted [facility] to confirm pt is still accepted for iHD and chair schedule. Per Olesya at [facility], pt was discharged from dialysis unit d/t not receiving treatment in >30 days. Per Olesya, a new referral should be sent to [facility].    SW to send pt's OP dialysis referral to [facility] requesting placement at [facility] for iHD. Updated hep b surface antibody required for referral. Lab requested. SW to send pt's referral as soon as required lab results.    SW to follow with updates.    Nakia Alvarado, ENRIQUE  [facility]  [phone]

## 2024-07-31 NOTE — SUBJECTIVE & OBJECTIVE
Past Medical History:   Diagnosis Date    Anticoagulant long-term use     Arthritis     Breast cancer 2014    invasive lobular carcinoma    Cancer of kidney 11/2020    RIGHT KIDNEY CANCER    CHF (congestive heart failure)     Coronary artery disease dx 2005    Depression     Diabetes mellitus     Diastolic heart failure secondary to hypertension     Gout     Hyperlipemia     Hypertension     Hypertrophy of nasal turbinates     Kidney mass 2020    Right    Levoscoliosis     Lung nodule     left    Multiple thyroid nodules     NICOLE (obstructive sleep apnea)     uses C-PAP    Pulmonary hypertension     Severe sepsis 07/01/2024     Past Surgical History:   Procedure Laterality Date    AORTOGRAPHY N/A 12/04/2020    Procedure: Aortogram;  Surgeon: Paul Pedersen MD;  Location: [facility] CATH;  Service: Cardiology;  Laterality: N/A;    BREAST SURGERY      BRONCHOSCOPY N/A 7/15/2024    Procedure: BRONCHOSCOPY, FLEXIBLE;  Surgeon: Tanya Beckham MD;  Location: [address];  Service: General;  Laterality: N/A;    CARDIAC CATHETERIZATION  12/2020    CHOLECYSTECTOMY      COLONOSCOPY      multi -last 2014     CORONARY ARTERY BYPASS GRAFT      ESOPHAGOGASTRODUODENOSCOPY      2012     HAND SURGERY Right     INSERTION OF CATHETER N/A 6/23/2024    Procedure: Insertion,catheter;  Surgeon: Meli Griffin MD;  Location: [facility] OR;  Service: Vascular;  Laterality: N/A;  ORIGINAL CATHETER WAS REPOSITIONED.  NO NEW CATHETER WAS PLACED    INSERTION OF TUNNELED CENTRAL VENOUS HEMODIALYSIS CATHETER Right 7/11/2024    Procedure: Insertion, Catheter, Central Venous, Hemodialysis;  Surgeon: Hawa Landa MD;  Location: [facility] CATH LAB;  Service: Interventional Nephrology;  Laterality: Right;    INSERTION, CATHETER, DIALYSIS, PERITONEAL N/A 6/4/2024    Procedure: IN Insertion Catheter, Dialysis, Peritoneal - laparoscopic;  Surgeon: Rodriguez Catalan Jr., MD;  Location: [facility];  Service: General;  Laterality: N/A;    LAPAROSCOPIC  "ROBOT-ASSISTED SURGICAL REMOVAL OF KIDNEY USING DA CHELLE XI Right 03/10/2022    Procedure: XI ROBOTIC NEPHRECTOMY- radical;  Surgeon: Rolando Ramirez MD;  Location: [facility] [place] OR;  Service: Urology;  Laterality: Right;    MASTECTOMY W/ SENTINEL NODE BIOPSY Bilateral 01/21/2015    bilateral "dog ears"    NASAL SINUS SURGERY  2015    Dr Bryant FESS/cauterization turbinate     PARTIAL HYSTERECTOMY  1989    PERCUTANEOUS TRANSLUMINAL BALLOON ANGIOPLASTY OF CORONARY ARTERY  12/04/2020    Procedure: Angioplasty-coronary;  Surgeon: Paul Pedersen MD;  Location: [facility] [place] CATH;  Service: Cardiology;;    PERITONEAL CATHETER REMOVAL N/A 7/15/2024    Procedure: REMOVAL, CATHETER, DIALYSIS, PERITONEAL;  Surgeon: Tanya Beckham MD;  Location: [facility] OR [address] [place] [address];  Service: General;  Laterality: N/A;    REMOVAL OF HEMODIALYSIS CATHETER  7/11/2024    Procedure: REMOVAL, CATHETER, HEMODIALYSIS;  Surgeon: Hawa Landa MD;  Location: [facility] CATH LAB;  Service: Interventional Nephrology;;    RENAL BIOPSY Right     9/20/2021 EJ    TUBAL LIGATION      ULTRASOUND GUIDANCE  12/04/2020    Procedure: Ultrasound Guidance;  Surgeon: Paul Pedersen MD;  Location: [facility] [place] CATH;  Service: Cardiology;;     Review of patient's allergies indicates:   Allergen Reactions    Percocet [oxycodone-acetaminophen] Itching    Januvia [sitagliptin]     Jardiance [empagliflozin]      Leg cramps    Lipitor [atorvastatin] Other (See Comments)     Severe leg pain    Linaclotide Other (See Comments) and Nausea And Vomiting     Does not remember    Lubiprostone Other (See Comments) and Palpitations     Does not remember       Scheduled Medications:    cholestyramine  1 packet Oral BID    duke's soln (benadryl 30 mL, mylanta 30 mL, LIDOcaine 30 mL, nystatin 30 mL) 120 mL  10 mL Oral QID    famotidine  20 mg Oral Daily    ferrous sulfate  1 tablet Oral Daily    fidaxomicin  200 mg Oral BID    LIDOcaine  2 patch Transdermal Q24H    metoprolol tartrate  50 mg Oral BID    " miconazole NITRATE 2 %   Topical (Top) BID    sacubitriL-valsartan  1 tablet Oral BID       PRN Medications:   Current Facility-Administered Medications:     0.9%  NaCl infusion (for blood administration), , Intravenous, Q24H PRN    acetaminophen, 650 mg, Oral, Q6H PRN    dextromethorphan-guaiFENesin  mg/5 ml, 5 mL, Oral, Q4H PRN    dextrose 10%, 12.5 g, Intravenous, PRN    dextrose 10%, 25 g, Intravenous, PRN    diphenhydrAMINE, 25 mg, Oral, Q6H PRN    glucagon (human recombinant), 1 mg, Intramuscular, PRN    glucose, 16 g, Oral, PRN    glucose, 24 g, Oral, PRN    heparin (PORCINE), 60 Units/kg (Adjusted), Intravenous, PRN    heparin (PORCINE), 30 Units/kg (Adjusted), Intravenous, PRN    hyoscyamine, 0.125 mg, Sublingual, Q4H PRN    insulin aspart U-100, 0-10 Units, Subcutaneous, QID (AC + HS) PRN    metoprolol, 5 mg, Intravenous, Q6H PRN    midodrine, 10 mg, Oral, Daily PRN    naloxone, 0.02 mg, Intravenous, PRN    ondansetron, 4 mg, Intravenous, Q6H PRN    simethicone, 1 tablet, Oral, TID PRN    sodium chloride 0.9%, 250 mL, Intravenous, PRN    sodium chloride 0.9%, 10 mL, Intravenous, PRN    traMADoL, 50 mg, Oral, Q8H PRN    Family History       Problem Relation (Age of Onset)    Asthma Daughter    Birth defects Daughter    Breast cancer Mother, Maternal Aunt    Depression Daughter    Drug abuse Daughter, Daughter    Glaucoma Sister    Hepatitis Brother    Hypertension Father    Learning disabilities Daughter    Mental illness Daughter    Stroke Father          Tobacco Use    Smoking status: Former     Current packs/day: 0.00     Types: Cigarettes     Start date: 2016     Quit date: 2016     Years since quittin.5    Smokeless tobacco: Never    Tobacco comments:     quit    Substance and Sexual Activity    Alcohol use: No    Drug use: No    Sexual activity: Not Currently     Partners: Male     Birth control/protection: None     Review of Systems   Constitutional:  Positive for  activity change and fatigue.   Neurological:  Positive for weakness.     Objective:     Vital Signs (Most Recent):  Temp: 98 °F (36.7 °C) (07/31/24 1140)  Pulse: 107 (07/31/24 1140)  Resp: 18 (07/31/24 1140)  BP: (!) 98/57 (07/31/24 1140)  SpO2: (!) 93 % (07/31/24 1140)    Vital Signs (24h Range):  Temp:  [98 °F (36.7 °C)-98.7 °F (37.1 °C)] 98 °F (36.7 °C)  Pulse:  [] 107  Resp:  [18-20] 18  SpO2:  [91 %-97 %] 93 %  BP: ()/(45-67) 98/57     Body mass index is 44.37 kg/m².     Physical Exam  Vitals and nursing note reviewed.   Constitutional:       Appearance: She is obese.      Comments: Drowsy difficult to arouse   HENT:      Nose: Nose normal.      Mouth/Throat:      Mouth: Mucous membranes are moist.   Pulmonary:      Effort: Pulmonary effort is normal. No respiratory distress.   Skin:     General: Skin is warm.   Neurological:      Motor: Weakness present.      Gait: Gait abnormal.      Comments: Drowsy   Unable to arouse to complete exam               Diagnostic Results: Labs: Reviewed  ECG: Reviewed  CT: Reviewed

## 2024-08-01 NOTE — PLAN OF CARE
[facility] - Stepdown Flex ([place]  Discharge Reassessment    Primary Care Provider: Tony Sadler MD    Expected Discharge Date: 8/5/2024    Reassessment (most recent)       Discharge Reassessment - 08/01/24 1527          Discharge Reassessment    Assessment Type Discharge Planning Reassessment     Did the patient's condition or plan change since previous assessment? No     Discharge Plan discussed with: Adult children     Communicated HARINI with patient/caregiver Yes     Discharge Plan A Skilled Nursing Facility     Discharge Plan B Rehab     DME Needed Upon Discharge  none     Transition of Care Barriers None     Why the patient remains in the hospital Requires continued medical care        Post-Acute Status    Post-Acute Authorization Dialysis;Placement     Post-Acute Placement Status Pending medical clearance/testing     Diaylsis Status Referrals Sent     Coverage Medicare part A & B     Hospital Resources/Appts/Education Provided Appointments scheduled and added to AVS     Discharge Delays Procedure Scheduling (IR, OR, Labs, Echo, Cath, Echo, EEG)                   NAVEEN spoke with kash April [phone] .    Discharge Plan A and Plan B have been determined by review of patient's clinical status, future medical and therapeutic needs, and coverage/benefits for post-acute care in coordination with multidisciplinary team members.    Verena Phillips MSW, CSW

## 2024-08-01 NOTE — NURSING
Nurses Note -- 4 Eyes      8/1/2024   2:43 AM      Skin assessed during: Daily Assessment      [] No Altered Skin Integrity Present    []Prevention Measures Documented      [x] Yes- Altered Skin Integrity Present or Discovered   [] LDA Added if Not in Epic (Describe Wound)   [x] New Altered Skin Integrity was Present on Admit and Documented in LDA   [] Wound Image Taken    Wound Care Consulted? Yes    Attending Nurse:  Christin Strickland RN/Staff Member:  Leidy

## 2024-08-01 NOTE — PROGRESS NOTES
"[facility] - Stepdown Flex ([facility]  Adult Nutrition  Progress Note    SUMMARY       Recommendations    Continue Renal diet + Novasource/Terry ONS.  RD to monitor & follow-up.    Goals: Meet % EEN, EPN by RD f/u date  Nutrition Goal Status: progressing towards goal  Communication of RD Recs: other (comment) (POC)    Assessment and Plan    Nutrition Problem:  Altered nutrition related lab values      Related to (etiology):   CKD     Signs and Symptoms (as evidenced by):   Elevated Creat, decreased GFR     Interventions(treatment strategy):  Collaboration of nutrition care w/ other providers      Nutrition Diagnosis Status:   Continues    Reason for Assessment    Reason For Assessment: RD follow-up  Diagnosis: other (see comments) (ESRD)  Relevant Medical History: CKD5, DM  Interdisciplinary Rounds: did not attend    General Information Comments: Was NPO for tunneled HD cath removal - diet + ONS just resumed. Pt received HD yesterday. Pt w/ no indicators of malnutrition.   Nutrition Discharge Planning: Adequate PO intake    Nutrition/Diet History    Spiritual, Cultural Beliefs, Jehovah's witness Practices, Values that Affect Care: no  Food Allergies: NKFA  Factors Affecting Nutritional Intake: decreased appetite    Anthropometrics    Temp: 97.5 °F (36.4 °C)  Height: 5' 7" (170.2 cm)  Height (inches): 67 in  Weight Method: Bed Scale  Weight: 128.5 kg (283 lb 4.7 oz)  Weight (lb): 283.29 lb  Ideal Body Weight (IBW), Female: 135 lb  % Ideal Body Weight, Female (lb): 209.84 %  BMI (Calculated): 44.4  BMI Grade: greater than 40 - morbid obesity    Lab/Procedures/Meds    Pertinent Labs Reviewed: reviewed  Pertinent Labs Comments: Creat 6.4, GFR 6.5, P 5.2, A1C 6.8  Pertinent Medications Reviewed: reviewed  Pertinent Medications Comments: Heparin    Estimated/Assessed Needs    Weight Used For Calorie Calculations: 128.5 kg (283 lb 4.7 oz)    Energy Calorie Requirements (kcal): 1828 kcal/d  Energy Need Method: [place] (1.0 PAL)    Protein Requirements: 129 g/d (1 g/kg)  Weight Used For Protein Calculations: 128.5 kg (283 lb 4.7 oz)    Estimated Fluid Requirement Method: other (see comments) (Per MD)  RDA Method (mL): 1828    Nutrition Prescription Ordered    Current Diet Order: Renal  Oral Nutrition Supplement: Novasource/Terry    Evaluation of Received Nutrient/Fluid Intake    I/O: +1.6L since 7/18    Comments: LBM: 7/31    Tolerance: tolerating    Nutrition Risk    Level of Risk/Frequency of Follow-up:  (1x/week)     Monitor and Evaluation    Food and Nutrient Intake: food and beverage intake, energy intake  Food and Nutrient Adminstration: diet order  Physical Activity and Function: nutrition-related ADLs and IADLs  Anthropometric Measurements: weight, weight change  Biochemical Data, Medical Tests and Procedures: glucose/endocrine profile, lipid profile, inflammatory profile, gastrointestinal profile, electrolyte and renal panel  Nutrition-Focused Physical Findings: overall appearance     Nutrition Follow-Up    RD Follow-up?: Yes

## 2024-08-01 NOTE — SUBJECTIVE & OBJECTIVE
Interval History: No acute events. Hb stable  Afebrile but increasing leukocytosis  Wound care noted small wound lateral R leg, cx grew pseudomonas.  Interventional nephrology gave alteplase, planning for dialysis again today.    Review of Systems   Reason unable to perform ROS: drowsy.     Objective:     Vital Signs (Most Recent):  Temp: 98.1 °F (36.7 °C) (08/01/24 1620)  Pulse: 93 (08/01/24 1620)  Resp: 20 (08/01/24 1620)  BP: (!) 106/51 (08/01/24 1620)  SpO2: 99 % (08/01/24 1620) Vital Signs (24h Range):  Temp:  [97.5 °F (36.4 °C)-98.2 °F (36.8 °C)] 98.1 °F (36.7 °C)  Pulse:  [] 93  Resp:  [19-22] 20  SpO2:  [93 %-100 %] 99 %  BP: ()/(47-73) 106/51     Weight: 128.5 kg (283 lb 4.7 oz)  Body mass index is 44.37 kg/m².    Intake/Output Summary (Last 24 hours) at 8/1/2024 1753  Last data filed at 8/1/2024 1712  Gross per 24 hour   Intake 2264.48 ml   Output 980 ml   Net 1284.48 ml      Physical Exam  Constitutional:       General: She is not in acute distress.     Appearance: Normal appearance. She is well-developed. She is not ill-appearing or diaphoretic.   HENT:      Head: Normocephalic and atraumatic.      Right Ear: External ear normal.      Left Ear: External ear normal.      Nose: Nose normal.   Eyes:      General: No scleral icterus.        Right eye: No discharge.         Left eye: No discharge.      Extraocular Movements: Extraocular movements intact.      Conjunctiva/sclera: Conjunctivae normal.   Cardiovascular:      Rate and Rhythm: Tachycardia present.   Pulmonary:      Effort: Pulmonary effort is normal. No respiratory distress.      Breath sounds: No stridor.   Abdominal:      Palpations: Abdomen is soft.      Tenderness: There is abdominal tenderness.      Comments: Former PD site c/d/i   Musculoskeletal:         General: Normal range of motion.   Skin:     General: Skin is dry.      Findings: No erythema or rash.   Neurological:      General: No focal deficit present.      Mental  Status: She is alert and oriented to person, place, and time. Mental status is at baseline.      Cranial Nerves: No cranial nerve deficit.   Psychiatric:         Mood and Affect: Mood normal.         Behavior: Behavior normal.         Thought Content: Thought content normal.         Judgment: Judgment normal.         MELD 3.0: 20 at 7/23/2024  2:01 PM  MELD-Na: 21 at 7/23/2024  2:01 PM  Calculated from:  Serum Creatinine: On dialysis. Using the maximum value.  Serum Sodium: 140 mmol/L (Using max of 137 mmol/L) at 7/23/2024  2:01 PM  Total Bilirubin: 0.8 mg/dL (Using min of 1 mg/dL) at 7/23/2024  4:02 AM  Serum Albumin: 1.8 g/dL at 7/23/2024  2:01 PM  INR(ratio): 1.1 at 7/21/2024  1:08 PM  Age at listing (hypothetical): 74 years  Sex: Female at 7/23/2024  2:01 PM      Significant Labs:  CBC:  Recent Labs   Lab 07/31/24  0709 08/01/24  0647   WBC 21.43* 25.44*   HGB 7.5* 7.8*   HCT 24.8* 26.6*    169     CMP:  Recent Labs   Lab 07/31/24  0709 08/01/24  0647   * 135*   K 4.5 3.9    108   CO2 14* 15*   GLU 94 89   BUN 55* 55*   CREATININE 6.5* 6.4*   CALCIUM 8.9 9.0   ALBUMIN 1.7* 1.8*   ANIONGAP 13 12     PTINR:  Recent Labs   Lab 08/01/24  0647   INR 1.3*       Significant Procedures:   Dobutamine Stress Test with Color Flow: No results found for this or any previous visit.

## 2024-08-01 NOTE — PROGRESS NOTES
[facility] ST[facility]FF HEMODIALYSIS NOTE     Patient currently on hemodialysis for removal of uremic toxins and volume.     Patient seen and evaluated on hemodialysis, tolerating treatment, see HD flowsheet for vitals and assessments.     Labs have been reviewed and the dialysate bath has been adjusted.        Assessment/Plan:     -Patient seen on HD, tolerating treatment well, w/o complaints   -S/p removal of cuffed tunneled HD catheter with IN this morning. No new tunneled cath placed due to leukocytosis. Patient remains with temporary L femoral  HD line.   -UF goal of 2 L initially however, patient hypotensive on arrival to [facility] 98/50 despite being given midodrine 10 mg prior to arrival. Will decrease UF goal to 1 L and monitor tolerance.   -Renal diet, if not NPO   -Strict I/O's and daily weights  -Daily renal function panels  -Keep MAP >65 while on HD   -Hgb goal 10-11, continue Epo  -Phos slightly elevated, will trend. No binders currently.  -Will continue to follow while inpatient       Addendum:   Treatment terminated after ~ 20 min due to line issues, L femoral line sluggish, only able to tolerate a BFR of 200 mL/min, blood seemed to be recirculating which clotted the system. Discussed with primary team. Patient will need to a new line for HD. Consider consulting anesthesia for new line placement. Unable to return patient's blood.   SBP also ranging 70-80s despite PRN Midodrine, UF stopped. Repeat BP now 87/49. Patient alert and oriented.

## 2024-08-01 NOTE — CARE UPDATE
Interventional Nephrology:     The patient WBC is increasing to 25K/ul. We will not be able to place a new cuffed tunneled line and have to take the Old Lt IJ cuffed tunneled out. Will arrange to do that today.     Meanwhile will have to use the Lt Femoral temporary HD for her dialysis.     Once her WBC improve will schedule a new cuffed tunneled HD catheter insertion.     Please keep her NPO.     If you have any question please feel free to call.       PENNY MOLINA.Alexandro. MD. ALISSA. JORDY.  , [employer] [facility] [employer] ([place]  Nephrology Consultant. [facility].   [address],  [facility]. [address]th [place] [address].    email: [email or web address]  Tel: Office: [phone]

## 2024-08-01 NOTE — PLAN OF CARE
Problem: Adult Inpatient Plan of Care  Goal: Plan of Care Review  Outcome: Progressing  Flowsheets (Taken 7/31/2024 1942)  Plan of Care Reviewed With: patient  Goal: Patient-Specific Goal (Individualized)  Outcome: Progressing  Goal: Absence of Hospital-Acquired Illness or Injury  Outcome: Progressing  Intervention: Identify and Manage Fall Risk  Flowsheets (Taken 7/31/2024 1942)  Safety Promotion/Fall Prevention: side rails raised x 2  Intervention: Prevent Skin Injury  Flowsheets (Taken 7/31/2024 1942)  Body Position: weight shifting  Skin Protection: incontinence pads utilized  Device Skin Pressure Protection: tubing/devices free from skin contact

## 2024-08-01 NOTE — PT/OT/SLP PROGRESS
Physical Therapy      Patient Name:  Juhi Taylor   MRN:  [id number]    Patient not seen today secondary to  (pt. had line removed had to lay flat in AM and going to dialysis in PM). Will follow-up tomorrow.    Natanael Carlton, PT  8/1/2024

## 2024-08-01 NOTE — PLAN OF CARE
7/31 PM    Patient is a 75 YO female admitted 7/3 with C. Diff. Patient is  A&Ox4. Rectal tube in place and draining. C/o left leg pain, ultrasound previous shift positive for DVT. Heparin gtt at 14 units/kg/hr at beginning of shift. APTT therapeutic x2. Hep gtt stopped at 2200 for procedure 8/1. Telemetry in place and running afib on monitor. Multiple wounds assessed. Wound care saw patient today. O2 @ 2L NC, refusing CPAP HS, vitals WNL. Daughter at bedside. Patient has no new complaints. Patient to have sx 8/1 for catheter exchange. NPO after MN. Patient had dialysis 7/31 with no fluid taken off d/t hypotension.      Problem: Adult Inpatient Plan of Care  Goal: Plan of Care Review  Outcome: Progressing  Goal: Patient-Specific Goal (Individualized)  Outcome: Progressing  Goal: Absence of Hospital-Acquired Illness or Injury  Outcome: Progressing  Goal: Optimal Comfort and Wellbeing  Outcome: Progressing  Goal: Readiness for Transition of Care  Outcome: Progressing     Problem: Diabetes Comorbidity  Goal: Blood Glucose Level Within Targeted Range  Outcome: Progressing     Problem: Sepsis/Septic Shock  Goal: Optimal Coping  Outcome: Progressing  Goal: Absence of Bleeding  Outcome: Progressing  Goal: Blood Glucose Level Within Targeted Range  Outcome: Progressing  Goal: Absence of Infection Signs and Symptoms  Outcome: Progressing  Goal: Optimal Nutrition Intake  Outcome: Progressing     Problem: Bariatric Environmental Safety  Goal: Safety Maintained with Care  Outcome: Progressing     Problem: Wound  Goal: Optimal Coping  Outcome: Progressing  Goal: Optimal Functional Ability  Outcome: Progressing  Goal: Absence of Infection Signs and Symptoms  Outcome: Progressing  Goal: Improved Oral Intake  Outcome: Progressing  Goal: Optimal Pain Control and Function  Outcome: Progressing  Goal: Skin Health and Integrity  Outcome: Progressing  Goal: Optimal Wound Healing  Outcome: Progressing     Problem: Skin Injury Risk  Increased  Goal: Skin Health and Integrity  Outcome: Progressing     Problem: Fall Injury Risk  Goal: Absence of Fall and Fall-Related Injury  Outcome: Progressing     Problem: Coping Ineffective  Goal: Effective Coping  Outcome: Progressing     Problem: CRRT (Continuous Renal Replacement Therapy)  Goal: Safe, Effective Therapy Delivery  Outcome: Progressing  Goal: Hemodynamic Stability  Outcome: Progressing  Goal: Body Temperature Maintained in Desired Range  Outcome: Progressing  Goal: Absence of Infection Signs and Symptoms  Outcome: Progressing     Problem: Infection  Goal: Absence of Infection Signs and Symptoms  Outcome: Progressing     Problem: Hemodialysis  Goal: Safe, Effective Therapy Delivery  Outcome: Progressing  Goal: Effective Tissue Perfusion  Outcome: Progressing  Goal: Absence of Infection Signs and Symptoms  Outcome: Progressing     Problem: Mechanical Ventilation Invasive  Goal: Effective Communication  Outcome: Progressing  Goal: Optimal Device Function  Outcome: Progressing  Goal: Mechanical Ventilation Liberation  Outcome: Progressing  Goal: Optimal Nutrition Delivery  Outcome: Progressing  Goal: Absence of Device-Related Skin and Tissue Injury  Outcome: Progressing  Goal: Absence of Ventilator-Induced Lung Injury  Outcome: Progressing     Problem: Artificial Airway  Goal: Effective Communication  Outcome: Progressing  Goal: Optimal Device Function  Outcome: Progressing  Goal: Absence of Device-Related Skin or Tissue Injury  Outcome: Progressing

## 2024-08-01 NOTE — PT/OT/SLP PROGRESS
Occupational Therapy      Patient Name:  Juhi Taylor   MRN:  [id number]    Patient not seen today secondary to hold per nurse due to Lt IJ Cuffed tunneled HD catheter removal. Will follow-up according to POC.    8/1/2024

## 2024-08-01 NOTE — PROGRESS NOTES
08/01/24 1500 08/01/24 1503   Trialysis (Dialysis) Catheter 07/20/24 1120 left femoral   Placement Date/Time: 07/20/24 1120   Present Prior to Hospital Arrival?: No  Hand Hygiene: Performed  Barrier Precautions: Performed  Skin Antisepsis: ChloraPrep  Location: left femoral  Insertion attempts (enter comment if more than 2 attempts): 2  G...   Site Assessment  --  No drainage   Line Securement Device  --  Secured with sutureless device   Dressing Type  --  CHG impregnated dressing/sponge   Dressing Status  --  Clean;Dry;Intact   Dressing Intervention  --  Integrity maintained   Venous Patency/Care  --  deaccessed;unable to flush, lumen marked   Arterial Patency/Care  --  deaccessed;unable to flush, lumen marked   During Hemodialysis Assessment   Dialysate Flow Rate (mL/min) 600 ml/min  --    Ultrafiltration Rate (mL/Hr) 0 mL/Hr  --    Arteriovenous Lines Secure Yes  --    Arterial Pressure (mmHg) 280 mmHg  --    Venous Pressure (mmHg) 150  --    Blood Volume Processed (Liters) 5.3 L  --    UF Removed (mL) 80 mL  --    TMP 50  --    Venous Line in Air Detector Yes  --    Intake (mL) 0 mL  --    Transducer Dry Yes  --    Access Visible Yes  --     notified of access issue? Yes  --    Heparin given? N/A  --    Intra-Hemodialysis Comments System clotted, unable to returned blood, NP at BS.  --    Post-Hemodialysis Assessment   Rinseback Volume (mL) 0 mL  --    Blood Volume Processed (Liters) 5.3 L  --    Dialyzer Clearance Clotted  --    Duration of Treatment 20 minutes  --    Total UF (mL) 80 mL  --    Net Fluid Removal 0  --    Patient Response to Treatment fem cath. not working.  --      Fem. CVC not working, system clotted within 20 min. Of treatment, unable to returned blood, NP aware. Patient left [facility] by bed NAD.

## 2024-08-01 NOTE — PROGRESS NOTES
[facility] - Stepdown Flex ([place] [id number])  [facility] Medicine  Progress Note    Patient Name: Juhi Taylor  MRN: [id number]  Patient Class: IP- Inpatient   Admission Date: 7/3/2024  Length of Stay: 29 days  Attending Physician: Amarilis Prieto MD  Primary Care Provider: Tony Sadler MD        Subjective:     Principal Problem:Clostridium difficile infection        HPI:  Juhi Taylor is a 74-year-old female with a past medical history of CKD stage 5 with recent PD catheter placement on 6/4, RCC s/p right nephrectomy, T2DM, obesity, CAD s/p PCI to Lcx and LAD in 11/2020 and HFmREF who is admitted as a transfer from [facility] for higher level of care and further cardiac evaluation. Nephrology consulted for hemodialysis. She initially presented on 6/18/24 to [facility] for hypervolemia in the setting of CKD 4-5 for which she had undergone elective PD catheter placement complicated by catheter site leaking. She had a complex hospital course, was initially placed on furosemide gtt without significant improvement and later underwent placement of tunneled line for HD with volume removal. During her second dialysis session she went into PEA arrest for which she was resuscitated, intubated and transferred to ICU. She was noted to have elevated troponin and subsequent TTE found reduced EF from 40-45% to 20-25% for which coronary angiography was recommended. Her hospital course was also complicated by dialysis line infection, significant bleeding around the catheter site, bradycardia and subsequent atrial fibrillation with RVR. Given the catheter site bleeding, anticoagulation was temporarily held. She has reported allergy to amiodarone, was initially placed on diltiazem for atrial fibrillation despite reduced EF. She discussed GOC at outside hospital where it appears that she lacked capacity per note and decided to pursue higher level of care at [facility]. At that time, gen surgery was planning to place a left  tunneled catheter due to concerns of the right side not being suitable     Upon admit, patient appeared drowsy but conversant. Daughter reports that she typically gets lethargic 2/2 to the volume overload due to not receiving dialysis. Patient reported b/l left lower extremity pain. Daughter concerned about her not having dialysis in 4 days and wants a session today.      Pt initially refusing dialysis. Seen by nephrology. VBG 7.220/33.4/34/13.7/-14. Critical care medicine consulted for emergent trialysis line placement and dialysis.     Overview/Hospital Course:  Ms. Taylor was admitted to MICU on 7/8 for urgent dialysis line placement and initiated of HD. Patient received short run of CRRT overnight without issue. Encephalopathy improving with continued CRRT treatments. Persistent AFib with RVR, received Digoxin loading dose (renally dosed), restarted PO Carvedilol. Patient remains in persistent AFib RVR despite therapeutic digoxin level and uptitration of Carvedilol levels, started on Amiodarone infusion overnight. Transitioned to amio PO, started on heparin gtt for afib AC. Tolerated HD well overnight without complications and less encephalopathic upon exam. Planning for gen surg to remove PD catheter. Upon induction pt vomited copious amounts of bile, was intubated. PD cathter removed successfully. Extubated to NC 7/16.      7/18:  Patient made DNR after Palliative Care discussions yesterday.  Tolerated SLED 12 hours overnight.  Discussed difficulties regarding HD line with Interventional Nephrology and plans to replace it next week if patient it amenable.  Patient was stepped down from ICU.  7/19:  Stepped back up to medical ICU with plans for overnight SLED x 3 days.     On 7/19 Nephrology requested that the patient be stepped back up to the medical ICU for SLED. A temporary trialysis HD catheter was placed on 7/20/24 in the groin. She also began having diarrhea, which tested positive for Clostridium  difficile so she was started on PO vancomycin 7/22/24. HD trial on 7/24; remained HDS. Ready for stepdown.    ID consulted and patient changed to fidaximicin. CT with Prominent wall thickening of the transverse colon and descending colon most prominent at the proximal transverse colon. GI consulted and pt not a candidate for fecal transplant currently.   Interventional nephrology plan to replace tunneled line on 8/1/24    Interval History: No acute events. Hb stable  Afebrile but increasing leukocytosis  Wound care noted small wound lateral R leg, cx grew pseudomonas.  Interventional nephrology gave alteplase, planning for dialysis again today.    Review of Systems   Reason unable to perform ROS: drowsy.     Objective:     Vital Signs (Most Recent):  Temp: 98.1 °F (36.7 °C) (08/01/24 1620)  Pulse: 93 (08/01/24 1620)  Resp: 20 (08/01/24 1620)  BP: (!) 106/51 (08/01/24 1620)  SpO2: 99 % (08/01/24 1620) Vital Signs (24h Range):  Temp:  [97.5 °F (36.4 °C)-98.2 °F (36.8 °C)] 98.1 °F (36.7 °C)  Pulse:  [] 93  Resp:  [19-22] 20  SpO2:  [93 %-100 %] 99 %  BP: ()/(47-73) 106/51     Weight: 128.5 kg (283 lb 4.7 oz)  Body mass index is 44.37 kg/m².    Intake/Output Summary (Last 24 hours) at 8/1/2024 1753  Last data filed at 8/1/2024 1712  Gross per 24 hour   Intake 2264.48 ml   Output 980 ml   Net 1284.48 ml      Physical Exam  Constitutional:       General: She is not in acute distress.     Appearance: Normal appearance. She is well-developed. She is not ill-appearing or diaphoretic.   HENT:      Head: Normocephalic and atraumatic.      Right Ear: External ear normal.      Left Ear: External ear normal.      Nose: Nose normal.   Eyes:      General: No scleral icterus.        Right eye: No discharge.         Left eye: No discharge.      Extraocular Movements: Extraocular movements intact.      Conjunctiva/sclera: Conjunctivae normal.   Cardiovascular:      Rate and Rhythm: Tachycardia present.   Pulmonary:       Effort: Pulmonary effort is normal. No respiratory distress.      Breath sounds: No stridor.   Abdominal:      Palpations: Abdomen is soft.      Tenderness: There is abdominal tenderness.      Comments: Former PD site c/d/i   Musculoskeletal:         General: Normal range of motion.   Skin:     General: Skin is dry.      Findings: No erythema or rash.   Neurological:      General: No focal deficit present.      Mental Status: She is alert and oriented to person, place, and time. Mental status is at baseline.      Cranial Nerves: No cranial nerve deficit.   Psychiatric:         Mood and Affect: Mood normal.         Behavior: Behavior normal.         Thought Content: Thought content normal.         Judgment: Judgment normal.         MELD 3.0: 20 at 7/23/2024  2:01 PM  MELD-Na: 21 at 7/23/2024  2:01 PM  Calculated from:  Serum Creatinine: On dialysis. Using the maximum value.  Serum Sodium: 140 mmol/L (Using max of 137 mmol/L) at 7/23/2024  2:01 PM  Total Bilirubin: 0.8 mg/dL (Using min of 1 mg/dL) at 7/23/2024  4:02 AM  Serum Albumin: 1.8 g/dL at 7/23/2024  2:01 PM  INR(ratio): 1.1 at 7/21/2024  1:08 PM  Age at listing (hypothetical): 74 years  Sex: Female at 7/23/2024  2:01 PM      Significant Labs:  CBC:  Recent Labs   Lab 07/31/24  0709 08/01/24  0647   WBC 21.43* 25.44*   HGB 7.5* 7.8*   HCT 24.8* 26.6*    169     CMP:  Recent Labs   Lab 07/31/24  0709 08/01/24  0647   * 135*   K 4.5 3.9    108   CO2 14* 15*   GLU 94 89   BUN 55* 55*   CREATININE 6.5* 6.4*   CALCIUM 8.9 9.0   ALBUMIN 1.7* 1.8*   ANIONGAP 13 12     PTINR:  Recent Labs   Lab 08/01/24  0647   INR 1.3*       Significant Procedures:   Dobutamine Stress Test with Color Flow: No results found for this or any previous visit.    Assessment/Plan:      * Clostridium difficile infection  Diagnosed 7/22/24.  Worsening leukocytosis and persistent diarrhea on PO vancomycin  Rectal tube placed  ID consulted  Stop p.o. vancomycin and start  fidaxomicin  CT abd/pelvis ordered to eval for worsening colitis  Cholestyramine for a binder of fidaxomicin  GI consult as patient may need fecal transplant in the future - not candidate currently      Acute deep vein thrombosis (DVT) of femoral vein of left lower extremity  LE venous doppler identified nonocclusive thrombus within the mid and distal femoral vein.   Cont heparin gtt.       Diarrhea  +C.diff,       Debility  PT/OT    Palliative care encounter  - Palliative care following, continued GOC with family.   - Patient has requested code status changed to DNR      ACP (advance care planning)  --Palliative care consulted, appreciate their assistance in clarification of GOC/POC with patient and family.      Atrial fibrillation with RVR  --AFib with RVR appears new following cardiac arrest at OSH.   --Patient endorses amiodarone reaction with itching, rash, and oral burning. Refused Sotalol, DCCV, and AICD placement at OSH.   --Was placed on Diltizem infusion and transitioned to PO metoprolol. Amio gtt restarted overnight 7/11 s/t uncontrolled afib with RVR. Transitioned to PO amio.  --Patient's rate remained elevated despite amiodarone. Appeared to respond better to metoprolol.   --Continue Toprol 50 mg daily.   --Apixaban on hold. Anticoagulation with a heparin infusion.      Acute on chronic combined systolic and diastolic heart failure  Echo    Left Ventricle: The left ventricle is moderately dilated. Normal wall thickness. Global hypokinesis present. There is severely reduced systolic function with a visually estimated ejection fraction of 20 - 25%.    Right Ventricle: Mild right ventricular enlargement. Wall thickness is normal. Right ventricle wall motion has global hypokinesis. Systolic function is moderately reduced.    Left Atrium: Left atrium is severely dilated.    Right Atrium: Right atrium is severely dilated.    Aortic Valve: The aortic valve is a trileaflet valve. There is mild aortic valve  sclerosis.    Mitral Valve: There is mild regurgitation.    Tricuspid Valve: There is severe regurgitation.    Pulmonary Artery: There is moderate pulmonary hypertension. The estimated pulmonary artery systolic pressure is 61 mmHg.    IVC/SVC: Elevated venous pressure at 15 mmHg.    Pericardium: There is a trivial circumferential effusion. No indication of cardiac tamponade.     - Will require interventional cardiology for LHC vs PET for reduced EF once euvolemic - deferring given pt's illness and co morbidities, infection  - LifeVest prior to discharge  - Continue Entresto 24-26mg BID per cardiology recs    ESRD (end stage renal disease)  Pt previously on PD. Unable to remove enough volume with PD catheter. Initially upgraded to MICU for emergent trialysis placement and CRRT. Now with tunneled dialysis catheter. Tolerated session of HD on 7/12 without complications.      - Nephrology following, appreciate recs  - Strict I/Os  - Renally dose meds  - Avoid nephrotoxic agents  - PD catheter removed 07/15  - Transferred to MICU for SLED  - Tunneled dialysis catheter is not functioning.   - Trialysis catheter placed in left femoral on 7/20.  - Tolerating iHD  - Received albumin 25 g q8h x 3 days per Nephrology, ending 7/25   - Planning for placement of new tunneled HD catheter - per interventional nephro    Severe obesity (BMI >= 40)  Body mass index is 44.37 kg/m². Morbid obesity complicates all aspects of disease management from diagnostic modalities to treatment. Weight loss encouraged and health benefits explained to patient.         Type 2 diabetes mellitus with microalbuminuria, without long-term current use of insulin  --Latest A1C 6.8; takes metformin at home.   --Target -180. Within goal.   --Continue to monitor.       Essential hypertension  --Takes carvedilol + clonidine at home.   --Hold antihypertensives due to hypotension.   --Continue metoprolol for rate control.       VTE Risk Mitigation (From  admission, onward)           Ordered     heparin (porcine) injection 1,000 Units  As needed (PRN)         08/01/24 1452     heparin 25,000 units in dextrose 5% (100 units/ml) IV bolus from bag LOW INTENSITY nomogram - [facility]  As needed (PRN)        Question:  Heparin Infusion Adjustment (DO NOT MODIFY ANSWER)  Answer:  [email or web address]\epic\Images\Pharmacy\HeparinInfusions\heparin LOW INTENSITY nomogram for [facility] [id number]    07/21/24 1236     heparin 25,000 units in dextrose 5% (100 units/ml) IV bolus from bag LOW INTENSITY nomogram - [facility]  As needed (PRN)        Question:  Heparin Infusion Adjustment (DO NOT MODIFY ANSWER)  Answer:  [email or web address]\epic\Images\Pharmacy\HeparinInfusions\heparin LOW INTENSITY nomogram for [facility] [id number].pdf    07/21/24 1236     heparin 25,000 units in dextrose 5% 250 mL (100 units/mL) infusion LOW INTENSITY nomogram - [facility]  Continuous        Question:  Begin at (units/kg/hr)  Answer:  12    07/21/24 1236     heparin 25,000 units in dextrose 5% 250 mL (100 units/mL) infusion (heparin infusion - NO NOMOGRAM)  Continuous         07/20/24 1217     Place sequential compression device  Until discontinued         07/15/24 1330     IP VTE HIGH RISK PATIENT  Once         07/15/24 1330                    Discharge Planning   HARINI: 8/5/2024     Code Status: DNR   Is the patient medically ready for discharge?:     Reason for patient still in hospital (select all that apply): Patient trending condition, Laboratory test, Treatment, and Consult recommendations  Discharge Plan A: Skilled Nursing Facility   Discharge Delays: (!) Procedure Scheduling (IR, OR, Labs, Echo, Cath, Echo, EEG)              Amarilis Prieto MD  Department of Hospital Medicine   [facility] - Stepdown Flex ([place]-[facility])

## 2024-08-01 NOTE — NURSING
Alteplase instilled in left fem trialysis cath lumens.Lumens capped, wrapped, labeled, Primary nurse at bedside made aware. Dr. Landa at bedside, verbal orders to allow med to dwell 4hrs

## 2024-08-01 NOTE — PROGRESS NOTES
Please see previous notes from this SW for continuity.    __________________________________________________  Referral for OP dialysis successfully uploaded to [facility] online portal [facility]    Pt pending medical and financial approval.    SW to follow with updates.    Nakia Alvarado, ENRIQUE  [facility]  [phone]

## 2024-08-01 NOTE — PROCEDURES
Removal of Lt IJ Cuffed tunneled HD catheter at bed side:    The patient WBC increased to  25K/uL. We have postponed placing a new cuffed tunneled HD catheter in view of the Leukocytosis.     I have explained to the patient and the family the procedure. The patient signes the consent.     Removal of Lt IJ cuffed tunneled HD catheter under local anesthesia at bed side with the the assistance of her nurse.     The patient Bp was low at  90 mmHg systolic. We gave  100 ml 0.9% NS. BP improved to 103/53 mmHg.                The patient was placed supine.                The skin was sterilized with Chlorhexidine.              1% Xylocaine with Epinephrine was given to anesthetize the skin and subcutaneous tissues along the tunnel.              The catheter tunneled was dissected to remove the fibrous sheath around the catheter.             The catheter was pulled.               Pressure was applied for 5 min.    Biopatch and Tegaderm were applied at the exit site.              Pressure dressing with rolled up 4 X4 was placed over the tunnel.              The procedure was smooth with no bleeding.      The tip of the catheter was sent for Culture.      Complications: No  Condition: Good  FOLLOWUP: Out patient        PENNY MOLINA.Alexandro. MD. ALISSA. FACP.  , [employer]  Nephrology Consultant. [facility].   [facility],  [facility]. 5th [place] [address].    email: [email or web address].  Tel: Office: [phone]

## 2024-08-01 NOTE — NURSING
PT AAO, BP soft MAPs ranging 63-73, all other VSS. PT c/o severe pain with movement of L leg, Tramadol and Tylenol given PER MAR.  Bedside procedure done by Dr. Landa to remove HD catheter to L subclavian.  Pt beckie procedure well, scott c/d/I. Pt went for dialysis today and was only able to run pt for 20 mins d/t line clotting and BP dropping.  Family at bedside, pt lying comfortable.    Bed low and locked, call light and belongings in reach.

## 2024-08-01 NOTE — SUBJECTIVE & OBJECTIVE
Interval History:  Pt stepped up to ICU yesterday due to hypotension. Now on levo .1. Received only ~ 20 minutes of HD yesterday due to malfunctioning femoral catheter. TDC was removed 8/1. Plan to replace TDC once WBC improves. Last full HD tx on 7/31, however with low BFR. Net +2.6L.              Review of patient's allergies indicates:   Allergen Reactions    Percocet [oxycodone-acetaminophen] Itching    Januvia [sitagliptin]      Jardiance [empagliflozin]         Leg cramps    Lipitor [atorvastatin] Other (See Comments)       Severe leg pain    Linaclotide Other (See Comments) and Nausea And Vomiting       Does not remember    Lubiprostone Other (See Comments) and Palpitations       Does not remember           Current Facility-Administered Medications   Medication Frequency    0.9%  NaCl infusion (for blood administration) Q24H PRN    acetaminophen tablet 650 mg Q6H PRN    cholestyramine 4 gram packet 4 g BID    dextromethorphan-guaiFENesin  mg/5 ml liquid 5 mL Q4H PRN    dextrose 10% bolus 125 mL 125 mL PRN    dextrose 10% bolus 250 mL 250 mL PRN    diphenhydrAMINE capsule 25 mg Q6H PRN    famotidine tablet 20 mg Daily    ferrous sulfate tablet 1 each Daily    fidaxomicin tablet 200 mg BID    glucagon (human recombinant) injection 1 mg PRN    glucose chewable tablet 16 g PRN    glucose chewable tablet 24 g PRN    heparin 25,000 units in dextrose 5% (100 units/ml) IV bolus from bag LOW INTENSITY nomogram - [facility] PRN    heparin 25,000 units in dextrose 5% (100 units/ml) IV bolus from bag LOW INTENSITY nomogram - [facility] PRN    heparin 25,000 units in dextrose 5% 250 mL (100 units/mL) infusion LOW INTENSITY nomogram - [facility] Continuous    hyoscyamine SL tablet 0.125 mg Q4H PRN    insulin aspart U-100 pen 0-10 Units QID (AC + HS) PRN    LIDOcaine 5 % patch 2 patch Q24H    metoprolol injection 5 mg Q6H PRN    metoprolol tartrate (LOPRESSOR) tablet 50 mg BID    miconazole NITRATE 2 % top powder BID    midodrine tablet  10 mg Daily PRN    naloxone 0.4 mg/mL injection 0.02 mg PRN    ondansetron injection 4 mg Q6H PRN    sacubitriL-valsartan 24-26 mg per tablet 1 tablet BID    simethicone chewable tablet 80 mg TID PRN    sodium chloride 0.9% bolus 250 mL 250 mL PRN    sodium chloride 0.9% flush 10 mL PRN    traMADoL tablet 50 mg Q8H PRN         Objective:      Vital Signs (Most Recent):  Temp: 97.7 °F (36.5 °C) (07/30/24 1137)  Pulse: (!) 117 (07/30/24 1137)  Resp: 20 (07/30/24 1137)  BP: 127/63 (07/30/24 1137)  SpO2: 99 % (07/30/24 1137) Vital Signs (24h Range):  Temp:  [97.7 °F (36.5 °C)-98.8 °F (37.1 °C)] 97.7 °F (36.5 °C)  Pulse:  [] 117  Resp:  [18-20] 20  SpO2:  [96 %-99 %] 99 %  BP: ()/(50-74) 127/63      Weight: 128.5 kg (283 lb 4.7 oz) (07/24/24 1247)  Body mass index is 44.37 kg/m².  Body surface area is 2.46 meters squared.          Physical Exam  Constitutional:       General: She is not in acute distress.     Appearance: Normal appearance.   Cardiovascular:      Rate and Rhythm: Normal rate.      Pulses: Normal pulses.   Pulmonary:      Effort: Pulmonary effort is normal. No respiratory distress.      Breath sounds: No wheezing or rhonchi.   Musculoskeletal:      Right lower leg:edema.      Left lower leg: edema.   Skin:     General: Skin is warm.   Neurological:      General: No focal deficit present.      Mental Status: Disoriented            Significant Labs:  BMP:       Recent Labs   Lab 07/30/24 0138         K 3.9      CO2 16*   BUN 49*   CREATININE 6.3*   CALCIUM 8.9   MG 2.1      CBC:       Recent Labs   Lab 07/30/24 0138   WBC 19.15*   RBC 2.79*   HGB 7.4*   HCT 23.6*      MCV 85   MCH 26.5*   MCHC 31.4*         Significant Imaging:  Labs: Reviewed  ECG: Reviewed  X-Ray: Reviewed  US: Reviewed  CT: Reviewed  MRI: Reviewed  Assessment/Plan:      Cardiac/Vascular  Acute on chronic combined systolic and diastolic heart failure  Per primary     Renal/  ESRD (end stage renal  disease)  ESRD - was briefly on PD then HD, having issues w tunnel cath and now w trialysis in place in [facility]. S/p R nephrectomy RCC  C diff positive - on oral vanc  HTN  DM  Afib w rvr  CHF w ischemic cardiomyopathy  S/p cardiac arrest      Plan/Recommendations    - 12 hour SLED today for metabolic clearance, UF goal 0.5L. Decrease UF if increase in pressor support.      Anemia in ESRD;  .Hemoglobin goal in ESRD is 10-12g/dl  Epo with HD   .        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        Lab Results   Component Value Date     .7 (H) 05/08/2024     CALCIUM 8.9 07/30/2024     CAION 1.02 (L) 07/20/2024     PHOS 4.8 (H) 07/30/2024     PHOS 4.8 (H) 07/30/2024               Thank you for your consult. I will follow-up with patient. Please contact us if you have any additional questions.    Frida San, NP   Nephrology

## 2024-08-01 NOTE — CARE UPDATE
Post removal of cuffed tunneled HD catheter care:    Please keep head elevation at  30 degrees for  2 hours.   Monitor the dressing for any bleeding.   Inform the primary physician in case bleeding is noticed.   Vital signes hourly for the coming  2 hours.     The patient original orders can be resumed.     PENNY MOLINA.Alexandro. MD. ALISSA. JACLYNP.  , [employer] / [employer] ([place]  Nephrology Consultant. [employer] [facility].   [facility]. 5th [place] [address].    email: [email or web address]  Tel: Office: [phone]

## 2024-08-01 NOTE — PROGRESS NOTES
08/01/24 1436 08/01/24 1439   Trialysis (Dialysis) Catheter 07/20/24 1120 left femoral   Placement Date/Time: 07/20/24 1120   Present Prior to Hospital Arrival?: No  Hand Hygiene: Performed  Barrier Precautions: Performed  Skin Antisepsis: ChloraPrep  Location: left femoral  Insertion attempts (enter comment if more than 2 attempts): 2  G...   Site Assessment No drainage  --    Line Securement Device Secured with sutureless device  --    Dressing Type CHG impregnated dressing/sponge  --    Dressing Status Clean;Dry;Intact  --    Dressing Intervention Integrity maintained  --    Venous Patency/Care accessed;blood return present;flushed w/o difficulty  --    Arterial Patency/Care accessed;blood return present;flushed w/o difficulty  --    Flows Reversed  (TPS aspirated)  --    During Hemodialysis Assessment   Blood Flow Rate (mL/min)  --  300 mL/min   Dialysate Flow Rate (mL/min)  --  600 ml/min   Ultrafiltration Rate (mL/Hr)  --  290 mL/Hr   Arteriovenous Lines Secure  --  Yes   Arterial Pressure (mmHg)  --  -180 mmHg   Venous Pressure (mmHg)  --  150   Blood Volume Processed (Liters)  --  0 L   UF Removed (mL)  --  0 mL   TMP  --  50   Venous Line in Air Detector  --  Yes   Intake (mL)  --  250 mL   Transducer Dry  --  Yes   Access Visible  --  Yes    notified of access issue?  --  N/A   Heparin given?  --  N/A   Intra-Hemodialysis Comments  --  HD started     Patient arrived [facility] by bed. HD started, lines reversed.

## 2024-08-02 PROBLEM — R68.89 SUSPECTED DEEP TISSUE INJURY: Status: ACTIVE | Noted: 2024-01-01

## 2024-08-02 PROBLEM — G93.40 ACUTE ENCEPHALOPATHY: Status: ACTIVE | Noted: 2024-01-01

## 2024-08-02 PROBLEM — Z71.89 ACP (ADVANCE CARE PLANNING): Status: RESOLVED | Noted: 2024-01-01 | Resolved: 2024-01-01

## 2024-08-02 PROBLEM — R65.21 SEPTIC SHOCK: Status: ACTIVE | Noted: 2024-01-01

## 2024-08-02 PROBLEM — I95.0 IDIOPATHIC HYPOTENSION: Status: ACTIVE | Noted: 2024-01-01

## 2024-08-02 PROBLEM — Z51.5 PALLIATIVE CARE ENCOUNTER: Status: RESOLVED | Noted: 2024-01-01 | Resolved: 2024-01-01

## 2024-08-02 NOTE — PT/OT/SLP DISCHARGE
Physical Therapy Discharge Summary    Name: Juhi Taylor  MRN: [id number]   Principal Problem: Clostridium difficile infection     Patient Discharged from acute Physical Therapy on 8/2/2024 due to transfer to ICU on 8/1.  Please refer to prior PT noted date on 7/31/2024 for functional status.     Assessment:     Patient appropriate for care in another setting. Pt transferred to higher level of care in ICU due to need for possible pressor support due to hypotension  as well as increasing somnolence.    Objective:     GOALS:   Multidisciplinary Problems       Physical Therapy Goals          Problem: Physical Therapy    Goal Priority Disciplines Outcome Goal Variances Interventions   Physical Therapy Goal     PT, PT/OT Progressing     Description: PT goals to be met by: 8/15/24    Patient will perform rolling each way with minimum assistance  Patient will perform supine <> sitting with minimum assistance  Patient will perform sit <> stand transitions with maximal assistance  Patient will perform transfers from bed <> chair or BSC with maximal assistance using LRAD  Patient will perform standing for 60 seconds with maximal assistance using RW or LRAD    DME Justifications (see above for complete DME recommendations)    Bedside Commode- Patient has a mobility limitation that significantly impairs their ability to participate in one or more mobility related activities of daily living, including toileting. This deficit can be resolved by using a bedside commode. Patient demonstrates mobility limitations that will cause them to be confined to one room at home without bathroom access for up to 30 days. Using a bedside commode will greatly improve the patient's ability to participate in MRADLs.     Rolling Walker- Patient demonstrates a mobility limitation that significantly impairs their ability to participate in one or more mobility related activities of daily living. Patient's mobility limitation cannot be  sufficiently resolved with the use of a cane, but can be sufficiently resolved with the use of a rolling walker.The use of a rolling walker will considerably improve their ability to participate in MRADLs. Patient will use the walker on a regular basis at home.      Wheelchair-  Patient has a mobility limitation that significantly impairs their ability to participate in one or more mobility related activities of daily living in customary locations in the home. The mobility limitation cannot be sufficiently resolved by the use of a cane or walker. The use of a manual wheelchair will greatly improve the patient's ability to participate in MRADLs. The patient will use the wheelchair on a regular basis at home. They have expressed their willingness to use a manual wheelchair in the home, and have a caregiver who is available and willing to assist with the wheelchair if needed.     Hospital Bed ·       Patient requires a hospital bed for positioning of the body in ways that are not feasible with an ordinary bed. The patient requires special positioning for pain relief, limited mobility, and/or being unable to independently make changes in body position without the use of a hospital bed. Pillows and wedges will not be adequate for resolving these positional issues.                         Reasons for Discontinuation of Therapy Services  Transfer to alternate level of care.      Plan:     [facility]      8/2/2024

## 2024-08-02 NOTE — SUBJECTIVE & OBJECTIVE
Scheduled Meds:   alteplase  4 mg Intra-Catheter Once    cholestyramine  1 packet Oral BID    duke's soln (benadryl 30 mL, mylanta 30 mL, LIDOcaine 30 mL, nystatin 30 mL) 120 mL  10 mL Oral QID    epoetin alaina  50 Units/kg Intravenous Every Mon, Wed, Fri    fidaxomicin  200 mg Oral BID    LIDOcaine  2 patch Transdermal Q24H    miconazole NITRATE 2 %   Topical (Top) BID    piperacillin-tazobactam (Zosyn) IV (PEDS and ADULTS) (extended infusion is not appropriate)  4.5 g Intravenous Q12H    vancomycin (VANCOCIN) IV (PEDS and ADULTS)  2,000 mg Intravenous Once     Continuous Infusions:   heparin (porcine) in D5W  0-40 Units/kg/hr (Adjusted) Intravenous Continuous   Paused at 08/02/24 0203    NORepinephrine bitartrate-D5W  0-3 mcg/kg/min Intravenous Continuous 6 mL/hr at 08/02/24 1133 0.1 mcg/kg/min at 08/02/24 1133     PRN Meds:  Current Facility-Administered Medications:     acetaminophen, 650 mg, Oral, Q6H PRN    dextromethorphan-guaiFENesin  mg/5 ml, 5 mL, Oral, Q4H PRN    dextrose 10%, 12.5 g, Intravenous, PRN    dextrose 10%, 25 g, Intravenous, PRN    diphenhydrAMINE, 25 mg, Oral, Q6H PRN    glucagon (human recombinant), 1 mg, Intramuscular, PRN    glucose, 16 g, Oral, PRN    glucose, 24 g, Oral, PRN    heparin (porcine), 1,000 Units, Intra-Catheter, PRN    heparin (PORCINE), 60 Units/kg (Adjusted), Intravenous, PRN    heparin (PORCINE), 30 Units/kg (Adjusted), Intravenous, PRN    HYDROmorphone, 0.5 mg, Intravenous, Q2H PRN    hyoscyamine, 0.125 mg, Sublingual, Q4H PRN    insulin aspart U-100, 0-10 Units, Subcutaneous, QID (AC + HS) PRN    metoprolol, 5 mg, Intravenous, Q6H PRN    midodrine, 10 mg, Oral, Daily PRN    naloxone, 0.02 mg, Intravenous, PRN    ondansetron, 4 mg, Intravenous, Q6H PRN    simethicone, 1 tablet, Oral, TID PRN    sodium chloride 0.9%, 250 mL, Intravenous, PRN    sodium chloride 0.9%, 10 mL, Intravenous, PRN    sodium chloride 0.9%, 10 mL, Intravenous, PRN    Pharmacy to dose  Vancomycin consult, , , Once **AND** vancomycin - pharmacy to dose, , Intravenous, pharmacy to manage frequency    Review of patient's allergies indicates:   Allergen Reactions    Percocet [oxycodone-acetaminophen] Itching    Januvia [sitagliptin]     Jardiance [empagliflozin]      Leg cramps    Lipitor [atorvastatin] Other (See Comments)     Severe leg pain    Linaclotide Other (See Comments) and Nausea And Vomiting     Does not remember    Lubiprostone Other (See Comments) and Palpitations     Does not remember        Past Medical History:   Diagnosis Date    Anticoagulant long-term use     Arthritis     Breast cancer 2014    invasive lobular carcinoma    Cancer of kidney 11/2020    RIGHT KIDNEY CANCER    CHF (congestive heart failure)     Coronary artery disease dx 2005    Depression     Diabetes mellitus     Diastolic heart failure secondary to hypertension     Gout     Hyperlipemia     Hypertension     Hypertrophy of nasal turbinates     Kidney mass 2020    Right    Levoscoliosis     Lung nodule     left    Multiple thyroid nodules     NICOLE (obstructive sleep apnea)     uses C-PAP    Pulmonary hypertension     Severe sepsis 07/01/2024     Past Surgical History:   Procedure Laterality Date    AORTOGRAPHY N/A 12/04/2020    Procedure: Aortogram;  Surgeon: Paul Pedersen MD;  Location: [facility] CATH;  Service: Cardiology;  Laterality: N/A;    BREAST SURGERY      BRONCHOSCOPY N/A 7/15/2024    Procedure: BRONCHOSCOPY, FLEXIBLE;  Surgeon: Tanya Beckham MD;  Location: [address];  Service: General;  Laterality: N/A;    CARDIAC CATHETERIZATION  12/2020    CHOLECYSTECTOMY      COLONOSCOPY      multi -last 2014     CORONARY ARTERY BYPASS GRAFT      ESOPHAGOGASTRODUODENOSCOPY      2012     HAND SURGERY Right     INSERTION OF CATHETER N/A 6/23/2024    Procedure: Insertion,catheter;  Surgeon: Meli Griffin MD;  Location: [facility] OR;  Service: Vascular;  Laterality: N/A;  ORIGINAL CATHETER WAS REPOSITIONED.  NO NEW  "CATHETER WAS PLACED    INSERTION OF TUNNELED CENTRAL VENOUS HEMODIALYSIS CATHETER Right 7/11/2024    Procedure: Insertion, Catheter, Central Venous, Hemodialysis;  Surgeon: Hawa Landa MD;  Location: [facility] CATH LAB;  Service: Interventional Nephrology;  Laterality: Right;    INSERTION, CATHETER, DIALYSIS, PERITONEAL N/A 6/4/2024    Procedure: IN Insertion Catheter, Dialysis, Peritoneal - laparoscopic;  Surgeon: Rodriguez Catalan Jr., MD;  Location: [facility];  Service: General;  Laterality: N/A;    LAPAROSCOPIC ROBOT-ASSISTED SURGICAL REMOVAL OF KIDNEY USING DA CHELLE XI Right 03/10/2022    Procedure: XI ROBOTIC NEPHRECTOMY- radical;  Surgeon: Rolando Ramirez MD;  Location: [facility] OR;  Service: Urology;  Laterality: Right;    MASTECTOMY W/ SENTINEL NODE BIOPSY Bilateral 01/21/2015    bilateral "dog ears"    NASAL SINUS SURGERY  2015    Dr Bryant FESS/cauterization turbinate     PARTIAL HYSTERECTOMY  1989    PERCUTANEOUS TRANSLUMINAL BALLOON ANGIOPLASTY OF CORONARY ARTERY  12/04/2020    Procedure: Angioplasty-coronary;  Surgeon: Paul Pedersen MD;  Location: [facility] CATH;  Service: Cardiology;;    PERITONEAL CATHETER REMOVAL N/A 7/15/2024    Procedure: REMOVAL, CATHETER, DIALYSIS, PERITONEAL;  Surgeon: Tanya Beckham MD;  Location: [address];  Service: General;  Laterality: N/A;    REMOVAL OF HEMODIALYSIS CATHETER  7/11/2024    Procedure: REMOVAL, CATHETER, HEMODIALYSIS;  Surgeon: Hawa Landa MD;  Location: [facility] CATH LAB;  Service: Interventional Nephrology;;    RENAL BIOPSY Right     9/20/2021 EJ    TUBAL LIGATION      ULTRASOUND GUIDANCE  12/04/2020    Procedure: Ultrasound Guidance;  Surgeon: Paul Pedersen MD;  Location: [facility] CATH;  Service: Cardiology;;       Family History       Problem Relation (Age of Onset)    Asthma Daughter    Birth defects Daughter    Breast cancer Mother, Maternal Aunt    Depression Daughter    Drug abuse Daughter, Daughter    Glaucoma Sister    Hepatitis Brother    " Hypertension Father    Learning disabilities Daughter    Mental illness Daughter    Stroke Father          Tobacco Use    Smoking status: Former     Current packs/day: 0.00     Types: Cigarettes     Start date: 2016     Quit date: 2016     Years since quittin.6    Smokeless tobacco: Never    Tobacco comments:     quit    Substance and Sexual Activity    Alcohol use: No    Drug use: No    Sexual activity: Not Currently     Partners: Male     Birth control/protection: None     Review of Systems   Skin:  Positive for wound.       Objective:     Vital Signs (Most Recent):  Temp: 97.4 °F (36.3 °C) (24 0300)  Pulse: 99 (24 1130)  Resp: 19 (24 1130)  BP: 105/69 (24 1130)  SpO2: 99 % (24 1130) Vital Signs (24h Range):  Temp:  [97.4 °F (36.3 °C)-98.7 °F (37.1 °C)] 97.4 °F (36.3 °C)  Pulse:  [] 99  Resp:  [13-29] 19  SpO2:  [96 %-100 %] 99 %  BP: ()/(40-69) 105/69     Weight: 128.5 kg (283 lb 4.7 oz)  Body mass index is 44.37 kg/m².     Physical Exam  Constitutional:       Appearance: Normal appearance.   Skin:     General: Skin is warm and dry.      Findings: Lesion present.   Neurological:      Mental Status: She is alert.          Laboratory:  All pertinent labs reviewed within the last 24 hours.    Diagnostic Results:  None

## 2024-08-02 NOTE — ASSESSMENT & PLAN NOTE
>>ASSESSMENT AND PLAN FOR IDIOPATHIC HYPOTENSION WRITTEN ON 8/2/2024  3:01 AM BY CECE CHIU MD    CCM re-consulted on the morning of 8/2 for hypotensive episode not resolved with ~150mL NS. Only received ~20 minutes of iHD yesterday s/t difficulty with fem trialysis line. Also C.diff +. Suspect hypotension s/t sepsis vs. Hypovolemic (due to increased stool output). Sating 100 on 2L NC O2; lungs clear in upper lobes to auscultation.    - Received 150mL NS per primary team  - Additional 500mL NS to be given over 1 hour  - Strict I/Os  - Start levo for hypotension, MAP > 65

## 2024-08-02 NOTE — CONSULTS
[facility] - Card[facility] Medical [facility]  Skin Integrity PABLO  Consult Note    Patient Name: Juhi Taylor  MRN: [id number]  Admission Date: 7/3/2024  Hospital Length of Stay: 30 days  Attending Physician: Bayron Borja MD  Primary Care Provider: Tony Sadler MD     Inpatient consult to Skin Integrity  Practitioner  Consult performed by: Anaya Wong, NP  Consult ordered by: Bayron Roy MD        Subjective:     History of Present Illness:  Juhi Taylor is a 74 year old female with a PMHx of CAD, ESRD on PD, HFrEF of 40%, and afib on eliquis who was transferred to [facility] from [facility] for a higher level of care. She originally presented to [facility] ED on 6/18 for worsening SOB and was found to be satting 89% on RA determined to be due to a heart failure exacerbation.  She was given lasix gtt and metolazone but still produced insufficient urine. HD was started, but during a dialysis session on 6/24 she suffered PEA cardiac arrest, epi was administered and she converted to V tach. After cardioversion ROSC was achieved and she was intubated and transferred to the ICU. Repeat Echo revealed an EF of 20-25% from 40% on admission but cardiology did not intervene at this time to due to her clinical condition but recommended later evaluation for ICD and angiogram. Her condition improved with further HD. She was extubated and moved to the floor but developed an uptrending leukocytosis possibly originating from a left trialysis catheter with bruising and possible purulent discharge. The line was removed. She was determined to not be a candidate for hospice. The family requested transfer to [facility] for a higher level of care. Patient admitted to hospital medicine service for further management. Skin integrity PABLO consulted for evaluation of skin injury.    Scheduled Meds:   alteplase  4 mg Intra-Catheter Once    cholestyramine  1 packet Oral BID    duke's soln (benadryl 30 mL, mylanta 30 mL, LIDOcaine  30 mL, nystatin 30 mL) 120 mL  10 mL Oral QID    epoetin alaina  50 Units/kg Intravenous Every Mon, Wed, Fri    fidaxomicin  200 mg Oral BID    LIDOcaine  2 patch Transdermal Q24H    miconazole NITRATE 2 %   Topical (Top) BID    piperacillin-tazobactam (Zosyn) IV (PEDS and ADULTS) (extended infusion is not appropriate)  4.5 g Intravenous Q12H    vancomycin (VANCOCIN) IV (PEDS and ADULTS)  2,000 mg Intravenous Once     Continuous Infusions:   heparin (porcine) in D5W  0-40 Units/kg/hr (Adjusted) Intravenous Continuous   Paused at 08/02/24 0203    NORepinephrine bitartrate-D5W  0-3 mcg/kg/min Intravenous Continuous 6 mL/hr at 08/02/24 1133 0.1 mcg/kg/min at 08/02/24 1133     PRN Meds:  Current Facility-Administered Medications:     acetaminophen, 650 mg, Oral, Q6H PRN    dextromethorphan-guaiFENesin  mg/5 ml, 5 mL, Oral, Q4H PRN    dextrose 10%, 12.5 g, Intravenous, PRN    dextrose 10%, 25 g, Intravenous, PRN    diphenhydrAMINE, 25 mg, Oral, Q6H PRN    glucagon (human recombinant), 1 mg, Intramuscular, PRN    glucose, 16 g, Oral, PRN    glucose, 24 g, Oral, PRN    heparin (porcine), 1,000 Units, Intra-Catheter, PRN    heparin (PORCINE), 60 Units/kg (Adjusted), Intravenous, PRN    heparin (PORCINE), 30 Units/kg (Adjusted), Intravenous, PRN    HYDROmorphone, 0.5 mg, Intravenous, Q2H PRN    hyoscyamine, 0.125 mg, Sublingual, Q4H PRN    insulin aspart U-100, 0-10 Units, Subcutaneous, QID (AC + HS) PRN    metoprolol, 5 mg, Intravenous, Q6H PRN    midodrine, 10 mg, Oral, Daily PRN    naloxone, 0.02 mg, Intravenous, PRN    ondansetron, 4 mg, Intravenous, Q6H PRN    simethicone, 1 tablet, Oral, TID PRN    sodium chloride 0.9%, 250 mL, Intravenous, PRN    sodium chloride 0.9%, 10 mL, Intravenous, PRN    sodium chloride 0.9%, 10 mL, Intravenous, PRN    Pharmacy to dose Vancomycin consult, , , Once **AND** vancomycin - pharmacy to dose, , Intravenous, pharmacy to manage frequency    Review of patient's allergies indicates:    Allergen Reactions    Percocet [oxycodone-acetaminophen] Itching    Januvia [sitagliptin]     Jardiance [empagliflozin]      Leg cramps    Lipitor [atorvastatin] Other (See Comments)     Severe leg pain    Linaclotide Other (See Comments) and Nausea And Vomiting     Does not remember    Lubiprostone Other (See Comments) and Palpitations     Does not remember        Past Medical History:   Diagnosis Date    Anticoagulant long-term use     Arthritis     Breast cancer 2014    invasive lobular carcinoma    Cancer of kidney 11/2020    RIGHT KIDNEY CANCER    CHF (congestive heart failure)     Coronary artery disease dx 2005    Depression     Diabetes mellitus     Diastolic heart failure secondary to hypertension     Gout     Hyperlipemia     Hypertension     Hypertrophy of nasal turbinates     Kidney mass 2020    Right    Levoscoliosis     Lung nodule     left    Multiple thyroid nodules     NICOLE (obstructive sleep apnea)     uses C-PAP    Pulmonary hypertension     Severe sepsis 07/01/2024     Past Surgical History:   Procedure Laterality Date    AORTOGRAPHY N/A 12/04/2020    Procedure: Aortogram;  Surgeon: Paul Pedersen MD;  Location: [facility] CATH;  Service: Cardiology;  Laterality: N/A;    BREAST SURGERY      BRONCHOSCOPY N/A 7/15/2024    Procedure: BRONCHOSCOPY, FLEXIBLE;  Surgeon: Tanya Beckham MD;  Location: [address];  Service: General;  Laterality: N/A;    CARDIAC CATHETERIZATION  12/2020    CHOLECYSTECTOMY      COLONOSCOPY      multi -last 2014     CORONARY ARTERY BYPASS GRAFT      ESOPHAGOGASTRODUODENOSCOPY      2012     HAND SURGERY Right     INSERTION OF CATHETER N/A 6/23/2024    Procedure: Insertion,catheter;  Surgeon: Meli Griffin MD;  Location: [facility] OR;  Service: Vascular;  Laterality: N/A;  ORIGINAL CATHETER WAS REPOSITIONED.  NO NEW CATHETER WAS PLACED    INSERTION OF TUNNELED CENTRAL VENOUS HEMODIALYSIS CATHETER Right 7/11/2024    Procedure: Insertion, Catheter, Central Venous,  "Hemodialysis;  Surgeon: Hawa Landa MD;  Location: [facility] CATH LAB;  Service: Interventional Nephrology;  Laterality: Right;    INSERTION, CATHETER, DIALYSIS, PERITONEAL N/A 6/4/2024    Procedure: IN Insertion Catheter, Dialysis, Peritoneal - laparoscopic;  Surgeon: Rodriguez Catalan Jr., MD;  Location: [facility];  Service: General;  Laterality: N/A;    LAPAROSCOPIC ROBOT-ASSISTED SURGICAL REMOVAL OF KIDNEY USING DA CHELLE XI Right 03/10/2022    Procedure: XI ROBOTIC NEPHRECTOMY- radical;  Surgeon: Rolando Ramirez MD;  Location: [facility] OR;  Service: Urology;  Laterality: Right;    MASTECTOMY W/ SENTINEL NODE BIOPSY Bilateral 01/21/2015    bilateral "dog ears"    NASAL SINUS SURGERY  2015    Dr Bryant FESS/cauterization turbinate     PARTIAL HYSTERECTOMY  1989    PERCUTANEOUS TRANSLUMINAL BALLOON ANGIOPLASTY OF CORONARY ARTERY  12/04/2020    Procedure: Angioplasty-coronary;  Surgeon: Paul Pedersen MD;  Location: [facility] CATH;  Service: Cardiology;;    PERITONEAL CATHETER REMOVAL N/A 7/15/2024    Procedure: REMOVAL, CATHETER, DIALYSIS, PERITONEAL;  Surgeon: Tanya Beckham MD;  Location: [address] FLR;  Service: General;  Laterality: N/A;    REMOVAL OF HEMODIALYSIS CATHETER  7/11/2024    Procedure: REMOVAL, CATHETER, HEMODIALYSIS;  Surgeon: Hawa Landa MD;  Location: [facility] CATH LAB;  Service: Interventional Nephrology;;    RENAL BIOPSY Right     9/20/2021 EJ    TUBAL LIGATION      ULTRASOUND GUIDANCE  12/04/2020    Procedure: Ultrasound Guidance;  Surgeon: Paul Pedersen MD;  Location: [facility] CATH;  Service: Cardiology;;       Family History       Problem Relation (Age of Onset)    Asthma Daughter    Birth defects Daughter    Breast cancer Mother, Maternal Aunt    Depression Daughter    Drug abuse Daughter, Daughter    Glaucoma Sister    Hepatitis Brother    Hypertension Father    Learning disabilities Daughter    Mental illness Daughter    Stroke Father          Tobacco Use    Smoking status: Former     " Current packs/day: 0.00     Types: Cigarettes     Start date: 2016     Quit date: 2016     Years since quittin.6    Smokeless tobacco: Never    Tobacco comments:     quit    Substance and Sexual Activity    Alcohol use: No    Drug use: No    Sexual activity: Not Currently     Partners: Male     Birth control/protection: None     Review of Systems   Skin:  Positive for wound.       Objective:     Vital Signs (Most Recent):  Temp: 97.4 °F (36.3 °C) (24 0300)  Pulse: 99 (24 1130)  Resp: 19 (24 1130)  BP: 105/69 (24 1130)  SpO2: 99 % (24 1130) Vital Signs (24h Range):  Temp:  [97.4 °F (36.3 °C)-98.7 °F (37.1 °C)] 97.4 °F (36.3 °C)  Pulse:  [] 99  Resp:  [13-29] 19  SpO2:  [96 %-100 %] 99 %  BP: ()/(40-69) 105/69     Weight: 128.5 kg (283 lb 4.7 oz)  Body mass index is 44.37 kg/m².     Physical Exam  Constitutional:       Appearance: Normal appearance.   Skin:     General: Skin is warm and dry.      Findings: Lesion present.   Neurological:      Mental Status: She is alert.          Laboratory:  All pertinent labs reviewed within the last 24 hours.    Diagnostic Results:  None      Assessment/Plan:              PABLO Skin Integrity Evaluation      Skin Integrity PABLO evaluation of patient as part of the comprehensive skin care team.     She has been admitted for 30 days. Skin injury was noted on 24. POA no.    Sacrum        Other  Suspected deep tissue injury  - consult received for evaluation of skin injury.  - [place] [facility] for a higher level of care. She originally presented to [facility] ED on  for worsening SOB and was found to be satting 89% on RA determined to be due to a heart failure exacerbation.   - suspected deep tissue injury to sacrum now revealing itself with pink, red, maroon moist wound base. Partial thickness tissue loss noted to wound bed. Will monitor as wound continues to reveal itself.  - continue Triad bid/prn.  - pt  has rectal tube in place with stool leakage. On treatment for c diff.  - anuric.  - Immerse surface.  - heel boots and pillows for offloading.  - turn q2h.  - jr score documented at 11 with a nutrition sub scale score of 2.  - RD following.   - nursing to maintain pressure injury prevention measures and continue wound care per orders.        Thank you for your consult. I will follow-up with patient. Please contact us if you have any additional questions.      Anaya Wong NP  Skin Integrity PABLO  [facility] - Cardiac Medical [facility]

## 2024-08-02 NOTE — ASSESSMENT & PLAN NOTE
Echo    Left Ventricle: The left ventricle is moderately dilated. Normal wall thickness. Global hypokinesis present. There is severely reduced systolic function with a visually estimated ejection fraction of 20 - 25%.    Right Ventricle: Mild right ventricular enlargement. Wall thickness is normal. Right ventricle wall motion has global hypokinesis. Systolic function is moderately reduced.    Left Atrium: Left atrium is severely dilated.    Right Atrium: Right atrium is severely dilated.    Aortic Valve: The aortic valve is a trileaflet valve. There is mild aortic valve sclerosis.    Mitral Valve: There is mild regurgitation.    Tricuspid Valve: There is severe regurgitation.    Pulmonary Artery: There is moderate pulmonary hypertension. The estimated pulmonary artery systolic pressure is 61 mmHg.    IVC/SVC: Elevated venous pressure at 15 mmHg.    Pericardium: There is a trivial circumferential effusion. No indication of cardiac tamponade.    - Will require interventional cardiology for LHC vs PET for reduced EF once euvolemic  - LifeVest prior to discharge  - Entresto recommended by cardiology, but currently on hold due to hypotension  - Strict I/Os

## 2024-08-02 NOTE — ASSESSMENT & PLAN NOTE
+ C.diff, currently on fidaxomicin    - Flexi in place; strict I/Os  - Increased output may be contributing to hypotensive episode

## 2024-08-02 NOTE — ASSESSMENT & PLAN NOTE
Echo    Left Ventricle: The left ventricle is moderately dilated. Normal wall thickness. Global hypokinesis present. There is severely reduced systolic function with a visually estimated ejection fraction of 20 - 25%.    Right Ventricle: Mild right ventricular enlargement. Wall thickness is normal. Right ventricle wall motion has global hypokinesis. Systolic function is moderately reduced.    Left Atrium: Left atrium is severely dilated.    Right Atrium: Right atrium is severely dilated.    Aortic Valve: The aortic valve is a trileaflet valve. There is mild aortic valve sclerosis.    Mitral Valve: There is mild regurgitation.    Tricuspid Valve: There is severe regurgitation.    Pulmonary Artery: There is moderate pulmonary hypertension. The estimated pulmonary artery systolic pressure is 61 mmHg.    IVC/SVC: Elevated venous pressure at 15 mmHg.    Pericardium: There is a trivial circumferential effusion. No indication of cardiac tamponade.    - Will require interventional cardiology for LHC vs PET for reduced EF once euvolemic  - LifeVest prior to discharge  - Entresto recommended by cardiology, but currently on hold due to sepsis  - Strict I/Os

## 2024-08-02 NOTE — PROCEDURES
Central Line    Date/Time: 8/2/2024 5:46 AM    Performed by: Rolando Dominique MD  Authorized by: Bayron Borja MD    Location procedure was performed:  [facility] CR[facility] MEDICINE  Assisting Provider:  Boone Truong MD  Consent Done ?:  Yes  Indications:  Hemodialysis  Anesthesia:  Local infiltration  Local anesthetic:  Lidocaine 1% without epinephrine  Preparation:  Skin prepped with ChloraPrep  Skin prep agent dried: Skin prep agent completely dried prior to procedure    Sterile barriers: All five maximal sterile barriers used - gloves, gown, cap, mask and large sterile sheet    Hand hygiene: Hand hygiene performed immediately prior to central venous catheter insertion    Location:  Left internal jugular  Catheter type:  Trialysis  Catheter size:  13 Fr  Inserted Catheter Length (cm):  17  Ultrasound guidance: Yes    Vessel Caliber:  Medium   patent  Comprressibility:  Normal  Needle advanced into vessel with real time ultrasound guidance.    Guidewire confirmed in vessel.    Steril sheath on probe.    Sterile gel used.  Manometry: Yes    Number of attempts:  3  Securement:  Line sutured, chlorhexidine patch, sterile dressing applied and blood return through all ports  Complications: No    Estimated blood loss (mL):  5  Specimens: No    Implants: No  Termination Site: right atrium

## 2024-08-02 NOTE — PROGRESS NOTES
[facility] - Cardiac Medical ICU  Critical Care Medicine  Progress Note    Patient Name: Juhi Taylor  MRN: [id number]  Admission Date: 7/3/2024  Hospital Length of Stay: 30 days  Code Status: Full Code  Attending Provider: Amarilis Prieto MD  Primary Care Provider: Tony Sadler MD   Principal Problem: Clostridium difficile infection    Subjective:     HPI:  Juhi Taylor is a 74-year-old female with a past medical history of CKD stage 5 with recent PD catheter placement on 6/4, RCC s/p right nephrectomy, T2DM, obesity, CAD s/p PCI to Lcx and LAD in 11/2020 and HFmREF who is admitted as a transfer from [facility] for higher level of care and further cardiac evaluation. Nephrology consulted for hemodialysis.     She initially presented on 6/18/24 to [facility] for hypervolemia in the setting of CKD 4-5 for which she had undergone elective PD catheter placement complicated by catheter site leaking. She had a complex hospital course, was initially placed on furosemide gtt without significant improvement and later underwent placement of tunneled line for HD with volume removal. During her second dialysis session she went into PEA arrest for which she was resuscitated, intubated and transferred to ICU. She was noted to have elevated troponin and subsequent TTE found reduced EF from 40-45% to 20-25% for which coronary angiography was recommended. Her hospital course was also complicated by dialysis line infection, significant bleeding around the catheter site, bradycardia and subsequent atrial fibrillation with RVR. Given the catheter site bleeding, anticoagulation was temporarily held. She has reported allergy to amiodarone, was initially placed on diltiazem for atrial fibrillation despite reduced EF. She discussed GOC at outside hospital where it appears that she lacked capacity per note and decided to pursue higher level of care at [facility]. At that time, General Surgery was planning to place a left tunneled  catheter due to concerns of the right side not being suitable     Upon admit, patient appeared drowsy but conversant. Daughter reports that she typically gets lethargic 2/2 to the volume overload due to not receiving dialysis. Patient reported b/l left lower extremity pain. Critical Care Medicine was consulted for emergent trialysis line placement and dialysis.     Ms. Taylor was admitted to MICU on 7/8/24 for urgent dialysis line placement and initiation of dialysis. Patient received short run of CRRT overnight without issue. Encephalopathy improving with continued CRRT treatments. She has had persistent AFib with RVR for which received Digoxin loading dose (renally dosed) and was restarted PO carvedilol. Patient remains in persistent AFib RVR despite therapeutic digoxin level and uptitration of carvedilol dosages so she was started on an amiodarone infusion. She was then transitioned to amio PO and started on heparin infusion for atrial fibrillation. General Surgery removed her peritoneal dialysis catheter on 7/15/24, but upon induction she vomited copious amounts of bile and was endotracheally intubated. She was extubated to nasal cannula on 7/16/24.     She has had difficulties with her tunneled HD line with sluggish flow especially through the venous side.  She would have a 4-hour instillation of tPA in each line before being able to run iHD, and even then it would be stopped before completion.  Interventional Nephrology plans to replace the line at some point next week.  Of note, Palliative Care was also consulted and patient had made her wishes known that she is a DNR code status.  She was still vacillating between replacing her tunneled HD line given her adverse event during her last procedure or going home with hospice.  The family was upset with her decision and April, her niece, asked that Palliative Care not return to see the patient.      Hospital/ICU Course:  Ms. Taylor was admitted to MICU on  "7/8 for urgent dialysis line placement and initiated of HD. Patient received short run of CRRT overnight without issue. Encephalopathy improving with continued CRRT treatments. Persistent AFib with RVR, received Digoxin loading dose (renally dosed), restarted PO Carvedilol. Patient remains in persistent AFib RVR despite therapeutic digoxin level and uptitration of Carvedilol levels, started on Amiodarone infusion overnight. Transitioned to amio PO, started on heparin gtt for afib AC. Tolerated HD well overnight without complications and less encephalopathic upon exam. Planning for gen surg to remove PD catheter. Upon induction pt vomited copious amounts of bile, was intubated. PD cathter removed successfully. Extubated to NC 7/16.     7/18:  Patient made DNR after Palliative Care discussions yesterday.  Tolerated SLED 12 hours overnight.  Discussed difficulties regarding HD line with Interventional Nephrology and plans to replace it next week if patient it amenable.  Patient was stepped down from ICU.  7/19:  Stepped back up to medical ICU with plans for overnight SLED x 3 days.    On 7/19 Nephrology requested that the patient be stepped back up to the medical ICU for SLED. A temporary trialysis HD catheter was placed on 7/20/24 in the groin. She also began having diarrhea, which tested positive for Clostridium difficile so she was started on PO vancomycin 7/22/24. HD trial on 7/24; remained HDS. Ready for stepdown.    Stepped down to  on 7/26/24. On 8/2/2024, [facility] re-consulted for hypotension (70s/40s) and increased somnolence.    No new subjective & objective note has been filed under this hospital service since the last note was generated.      ABG  No results for input(s): "PH", "PO2", "PCO2", "HCO3", "BE" in the last 168 hours.  Assessment/Plan:     Cardiac/Vascular  Idiopathic hypotension  CCM re-consulted on the morning of 8/2 for hypotensive episode not resolved with ~150mL NS. Only received ~20 minutes of " iHD yesterday s/t difficulty with fem trialysis line. Also C.diff +. Suspect hypotension s/t sepsis vs. Hypovolemic (due to increased stool output). Sating 100 on 2L NC O2; lungs clear in upper lobes to auscultation.    - Received 150mL NS per primary team  - Additional 500mL NS to be given over 1 hour  - Strict I/Os  - Start levo for hypotension, MAP > 65    Atrial fibrillation with RVR  - AFib with RVR appears new following cardiac arrest at OSH  - Patient endorses amiodarone reaction with itching, rash, and oral burning. Refused Sotalol, DCCV, and AICD placement at OSH.   - Was placed on Diltizem infusion and transitioned to PO metoprolol. Amio gtt restarted overnight 7/11 s/t uncontrolled afib with RVR. Transitioned to PO amio  - Patient's rate remained elevated despite amiodarone. Appeared to respond better to metoprolol.   - Continue Toprol 50 mg daily  - Apixaban on hold. Anticoagulation with a heparin infusion    Acute on chronic combined systolic and diastolic heart failure  Echo    Left Ventricle: The left ventricle is moderately dilated. Normal wall thickness. Global hypokinesis present. There is severely reduced systolic function with a visually estimated ejection fraction of 20 - 25%.    Right Ventricle: Mild right ventricular enlargement. Wall thickness is normal. Right ventricle wall motion has global hypokinesis. Systolic function is moderately reduced.    Left Atrium: Left atrium is severely dilated.    Right Atrium: Right atrium is severely dilated.    Aortic Valve: The aortic valve is a trileaflet valve. There is mild aortic valve sclerosis.    Mitral Valve: There is mild regurgitation.    Tricuspid Valve: There is severe regurgitation.    Pulmonary Artery: There is moderate pulmonary hypertension. The estimated pulmonary artery systolic pressure is 61 mmHg.    IVC/SVC: Elevated venous pressure at 15 mmHg.    Pericardium: There is a trivial circumferential effusion. No indication of cardiac  tamponade.    - Will require interventional cardiology for LHC vs PET for reduced EF once euvolemic  - LifeVest prior to discharge  - Entresto recommended by cardiology, but currently on hold due to hypotension  - Strict I/Os    Essential hypertension  --Takes carvedilol + clonidine at home.   --Hold antihypertensives due to hypotension.   --Continue metoprolol for rate control.     Renal/  ESRD (end stage renal disease)  - PD catheter removed 07/15  - Tunneled dialysis catheter was not functioning and therefore removed on 8/1 by nephrology  - Trialysis catheter placed in left fem on 7/20  - Tolerating iHD  - Start albumin 25 g q8h x 3 days per Nephrology, ending 7/25   - Attempted iHD on 8/1 but resistance with left fem trialysis line; only able to complete ~20 minutes. Will require new trialysis line to be placed by anesthesiology per nephrology recs    ID  * Clostridium difficile infection  --Diagnosed 7/22/24.  --PO vancomycin 125 mg q6h x 10 days. Stop date: 8/1/24.    Endocrine  Severe obesity (BMI >= 40)  --Morbid obesity complicating her medical care.    Type 2 diabetes mellitus with microalbuminuria, without long-term current use of insulin  --Latest A1C 6.8; takes metformin at home.   --Target -180. Within goal.   --Continue to monitor.     GI  Diarrhea  +C.diff, continue PO vancomycin    - Flexi in place; strict I/Os  - Increased output may be contributing to hypotensive episode    Palliative Care  Palliative care encounter  - Palliative care following, continued GOC with family.     ACP (advance care planning)  --Palliative care consulted, appreciate their assistance in clarification of GOC/POC with patient and family.    Goals of care, counseling/discussion  Code status changed to Full Code overnight on 8/1 by primary team.    - Spoke with daughter at bedside who stated that she would want her mother to be stepped back up to the ICU if vasopressors were required for hypotension  - Recommend  continued [facility] conversations with patient and family regarding code status; as patient requested to be DNR previously- see palliative note from 7/18      Other  Debility  --PT/OT.       Critical Care Daily Checklist:    A: Awake: RASS Goal/Actual Goal: RASS Goal: 0-->alert and calm  Actual:     B: Spontaneous Breathing Trial Performed? Spon. Breathing Trial Initiated?: Initiated (07/16/24 1003)   C: SAT & SBT Coordinated?  N/A                      D: Delirium: CAM-ICU Overall CAM-ICU: Negative   E: Early Mobility Performed? No   F: Feeding Goal: Goals: Meet % EEN, EPN by RD f/u date  Status: Nutrition Goal Status: progressing towards goal   Current Diet Order   Procedures    Diet Renal      AS: Analgesia/Sedation None   T: Thromboembolic Prophylaxis None, currently on heparin gtt   H: HOB > 300 No   U: Stress Ulcer Prophylaxis (if needed) None   G: Glucose Control SSI   B: Bowel Function Stool Occurrence: 1   I: Indwelling Catheter (Lines & Damon) Necessity LF Trialysis line 12 days, PIV   D: De-escalation of Antimicrobials/Pharmacotherapies None    Plan for the day/ETD Continue to Monitor    Code Status:  Family/Goals of Care: Full Code         Critical secondary to Patient has a condition that poses threat to life and bodily function: Acute Renal Failure      Critical care was time spent personally by me on the following activities: development of treatment plan with patient or surrogate and bedside caregivers, discussions with consultants, evaluation of patient's response to treatment, examination of patient, ordering and performing treatments and interventions, ordering and review of laboratory studies, ordering and review of radiographic studies, pulse oximetry, re-evaluation of patient's condition. This critical care time did not overlap with that of any other provider or involve time for any procedures.     Rolando Dominique MD  Critical Care Medicine  [facility] - Cardiac Medical ICU

## 2024-08-02 NOTE — PROGRESS NOTES
[facility] - Cardiac Medical ICU  Critical Care Medicine  Progress Note    Patient Name: Juhi Taylor  MRN: [id number]  Admission Date: 7/3/2024  Hospital Length of Stay: 30 days  Code Status: Full Code  Attending Provider: Bayron Borja MD  Primary Care Provider: Tony Sadler MD   Principal Problem: Clostridium difficile infection    Subjective:     HPI:  Juhi Taylor is a 74-year-old female with a past medical history of CKD stage 5 with recent PD catheter placement on 6/4, RCC s/p right nephrectomy, T2DM, obesity, CAD s/p PCI to Lcx and LAD in 11/2020 and HFmREF who is admitted as a transfer from [facility] for higher level of care and further cardiac evaluation. Nephrology consulted for hemodialysis.     She initially presented on 6/18/24 to [facility] for hypervolemia in the setting of CKD 4-5 for which she had undergone elective PD catheter placement complicated by catheter site leaking. She had a complex hospital course, was initially placed on furosemide gtt without significant improvement and later underwent placement of tunneled line for HD with volume removal. During her second dialysis session she went into PEA arrest for which she was resuscitated, intubated and transferred to ICU. She was noted to have elevated troponin and subsequent TTE found reduced EF from 40-45% to 20-25% for which coronary angiography was recommended. Her hospital course was also complicated by dialysis line infection, significant bleeding around the catheter site, bradycardia and subsequent atrial fibrillation with RVR. Given the catheter site bleeding, anticoagulation was temporarily held. She has reported allergy to amiodarone, was initially placed on diltiazem for atrial fibrillation despite reduced EF. She discussed GOC at outside hospital where it appears that she lacked capacity per note and decided to pursue higher level of care at [facility]. At that time, General Surgery was planning to place a left tunneled  catheter due to concerns of the right side not being suitable     Upon admit, patient appeared drowsy but conversant. Daughter reports that she typically gets lethargic 2/2 to the volume overload due to not receiving dialysis. Patient reported b/l left lower extremity pain. Critical Care Medicine was consulted for emergent trialysis line placement and dialysis.     Ms. Taylor was admitted to MICU on 7/8/24 for urgent dialysis line placement and initiation of dialysis. Patient received short run of CRRT overnight without issue. Encephalopathy improving with continued CRRT treatments. She has had persistent AFib with RVR for which received Digoxin loading dose (renally dosed) and was restarted PO carvedilol. Patient remains in persistent AFib RVR despite therapeutic digoxin level and uptitration of carvedilol dosages so she was started on an amiodarone infusion. She was then transitioned to amio PO and started on heparin infusion for atrial fibrillation. General Surgery removed her peritoneal dialysis catheter on 7/15/24, but upon induction she vomited copious amounts of bile and was endotracheally intubated. She was extubated to nasal cannula on 7/16/24.     She has had difficulties with her tunneled HD line with sluggish flow especially through the venous side.  She would have a 4-hour instillation of tPA in each line before being able to run iHD, and even then it would be stopped before completion.  Interventional Nephrology plans to replace the line at some point next week.  Of note, Palliative Care was also consulted and patient had made her wishes known that she is a DNR code status.  She was still vacillating between replacing her tunneled HD line given her adverse event during her last procedure or going home with hospice.  The family was upset with her decision and April, her niece, asked that Palliative Care not return to see the patient.      Hospital/ICU Course:  Ms. Taylor was admitted to MICU on  7/8 for urgent dialysis line placement and initiated of HD. Patient received short run of CRRT overnight without issue. Encephalopathy improving with continued CRRT treatments. Persistent AFib with RVR, received Digoxin loading dose (renally dosed), restarted PO Carvedilol. Patient remains in persistent AFib RVR despite therapeutic digoxin level and uptitration of Carvedilol levels, started on Amiodarone infusion overnight. Transitioned to amio PO, started on heparin gtt for afib AC. Tolerated HD well overnight without complications and less encephalopathic upon exam. Planning for gen surg to remove PD catheter. Upon induction pt vomited copious amounts of bile, was intubated. PD cathter removed successfully. Extubated to NC 7/16.     7/18:  Patient made DNR after Palliative Care discussions yesterday.  Tolerated SLED 12 hours overnight.  Discussed difficulties regarding HD line with Interventional Nephrology and plans to replace it next week if patient it amenable.  Patient was stepped down from ICU.  7/19:  Stepped back up to medical ICU with plans for overnight SLED x 3 days.    On 7/19 Nephrology requested that the patient be stepped back up to the medical ICU for SLED. A temporary trialysis HD catheter was placed on 7/20/24 in the groin. She also began having diarrhea, which tested positive for Clostridium difficile so she was started on PO vancomycin 7/22/24. HD trial on 7/24; remained HDS. Ready for stepdown.    Stepped down to  on 7/26/24. On 8/2/2024, [facility] re-consulted for hypotension (70s/40s) and increased somnolence.    8/2: Nephrology following for dialysis needs. Placed on vancomycin and zosyn due to aerobic culture growing pseudomonas. On levophed 0.1. D/c entresto and metoprolol. Will likely need to pull left femoral line. LIJ placed    Interval History/Significant Events: Stepped up to MICU for hypotension and somnolence. Only received 20 mins of dialysis yesterday due to issues with line. LIJ  placed. Now on vancomycin and zosyn. On Levophed 0.1    Review of Systems   Unable to perform ROS: Acuity of condition     Objective:     Vital Signs (Most Recent):  Temp: 97.1 °F (36.2 °C) (08/02/24 1200)  Pulse: 93 (08/02/24 1330)  Resp: 14 (08/02/24 1330)  BP: 116/61 (08/02/24 1330)  SpO2: 100 % (08/02/24 1330) Vital Signs (24h Range):  Temp:  [97.1 °F (36.2 °C)-98.7 °F (37.1 °C)] 97.1 °F (36.2 °C)  Pulse:  [] 93  Resp:  [13-32] 14  SpO2:  [89 %-100 %] 100 %  BP: ()/(40-69) 116/61   Weight: 128.5 kg (283 lb 4.7 oz)  Body mass index is 44.37 kg/m².      Intake/Output Summary (Last 24 hours) at 8/2/2024 1354  Last data filed at 8/2/2024 0600  Gross per 24 hour   Intake 2927.99 ml   Output 280 ml   Net 2647.99 ml          Physical Exam  Vitals and nursing note reviewed.   Constitutional:       Appearance: She is ill-appearing.   HENT:      Head: Normocephalic.   Pulmonary:      Effort: Pulmonary effort is normal. No respiratory distress.      Breath sounds: Normal breath sounds.   Neurological:      Mental Status: She is disoriented.      Comments: Likely uremic encephalopathy            Vents:  Vent Mode: Spont (07/16/24 1003)  Set Rate: 15 BPM (07/16/24 0903)  Vt Set: 420 mL (07/16/24 0903)  Pressure Support: 5 cmH20 (07/16/24 1003)  PEEP/CPAP: 5 cmH20 (07/16/24 1003)  Oxygen Concentration (%): 32 (07/25/24 1755)  Peak Airway Pressure: 10 cmH20 (07/16/24 1003)  Plateau Pressure: 0 cmH20 (07/16/24 1003)  Total Ve: 5.45 L/m (07/16/24 1003)  Negative Inspiratory Force (cm H2O): 0 (07/16/24 1003)  Lines/Drains/Airways       Central Venous Catheter Line  Duration             Trialysis (Dialysis) Catheter 07/20/24 1120 left femoral 13 days    Trialysis (Dialysis) Catheter 08/02/24 0452 left internal jugular <1 day              Drain  Duration                  Rectal Tube 07/28/24 0000 5 days                  Significant Labs:    CBC/Anemia Profile:  Recent Labs   Lab 08/01/24  0647 08/02/24  0321   WBC  "25.44* 28.41*   HGB 7.8* 7.3*   HCT 26.6* 24.3*    207   MCV 91 89   RDW 23.2* 23.0*        Chemistries:  Recent Labs   Lab 08/01/24  0647 08/02/24  0321   * 135*   K 3.9 3.8    108   CO2 15* 12*   BUN 55* 60*   CREATININE 6.4* 6.5*   CALCIUM 9.0 8.7   ALBUMIN 1.8* 1.5*   MG 2.1 2.2   PHOS 5.2*  5.2* 5.7*  5.7*       All pertinent labs within the past 24 hours have been reviewed.    Significant Imaging:  I have reviewed all pertinent imaging results/findings within the past 24 hours.    ABG  No results for input(s): "PH", "PO2", "PCO2", "HCO3", "BE" in the last 168 hours.  Assessment/Plan:     Neuro  Acute encephalopathy  Likely multifactorial in the setting of septic shock and uremia  - Continue to monitor      Cardiac/Vascular  Atrial fibrillation with RVR  - AFib with RVR appears new following cardiac arrest at OSH  - Patient endorses amiodarone reaction with itching, rash, and oral burning. Refused Sotalol, DCCV, and AICD placement at OSH.   - Was placed on Diltizem infusion and transitioned to PO metoprolol. Amio gtt restarted overnight 7/11 s/t uncontrolled afib with RVR. Transitioned to PO amio  - Patient's rate remained elevated despite amiodarone. Appeared to respond better to metoprolol.   - Hold lopressor in setting of septic shock  - Apixaban on hold. Anticoagulation with a heparin infusion    Acute on chronic combined systolic and diastolic heart failure  Echo    Left Ventricle: The left ventricle is moderately dilated. Normal wall thickness. Global hypokinesis present. There is severely reduced systolic function with a visually estimated ejection fraction of 20 - 25%.    Right Ventricle: Mild right ventricular enlargement. Wall thickness is normal. Right ventricle wall motion has global hypokinesis. Systolic function is moderately reduced.    Left Atrium: Left atrium is severely dilated.    Right Atrium: Right atrium is severely dilated.    Aortic Valve: The aortic valve is a " trileaflet valve. There is mild aortic valve sclerosis.    Mitral Valve: There is mild regurgitation.    Tricuspid Valve: There is severe regurgitation.    Pulmonary Artery: There is moderate pulmonary hypertension. The estimated pulmonary artery systolic pressure is 61 mmHg.    IVC/SVC: Elevated venous pressure at 15 mmHg.    Pericardium: There is a trivial circumferential effusion. No indication of cardiac tamponade.    - Will require interventional cardiology for LHC vs PET for reduced EF once euvolemic  - LifeVest prior to discharge  - Entresto recommended by cardiology, but currently on hold due to sepsis  - Strict I/Os    Essential hypertension  --Takes carvedilol + clonidine at home.   -- Hold antihypertensives due to hypotension.       Renal/  ESRD (end stage renal disease)  - PD catheter removed 07/15  - Tunneled dialysis catheter was not functioning and therefore removed on 8/1 by nephrology  - Trialysis catheter placed in left fem on 7/20  - Tolerating iHD  - Start albumin 25 g q8h x 3 days per Nephrology, ending 7/25   - Attempted iHD on 8/1 but resistance with left fem trialysis line; only able to complete ~20 minutes. Will require new trialysis line to be placed by anesthesiology per nephrology recs  - 8/2: will attempt SLED per nephrology    ID  * Clostridium difficile infection  --Diagnosed 7/22/24.  -- Currently on fidaxomicin     Septic shock  Aerobic culture positive for pseudomonas. Likely infected left femoral line    - Tentatively plan to remove left femoral line  - Vancomycin and Zosyn ordered  - Levophed MAP > 65  - Repeat blood cultures ordered    Hematology  Acute deep vein thrombosis (DVT) of femoral vein of left lower extremity  US Lower Extremity Veins Bilaterally with left lower extremity DVT   - Continue heparin    Endocrine  Severe obesity (BMI >= 40)  --Morbid obesity complicating her medical care.    Type 2 diabetes mellitus with microalbuminuria, without long-term current use of  insulin  -- Latest A1C 6.8; takes metformin at home.   -- Target -180. Within goal.   -- Continue to monitor.   -- SSI ordered    GI  Diarrhea  + C.diff, currently on fidaxomicin    - Flexi in place; strict I/Os  - Increased output may be contributing to hypotensive episode    Palliative Care  Goals of care, counseling/discussion  Code status changed to Full Code overnight on 8/1 by primary team.    - Spoke with daughter at bedside who stated that she would want her mother to be stepped back up to the ICU if vasopressors were required for hypotension        Other  Suspected deep tissue injury  Appreciate wound care recs    Debility  --PT/OT.       Critical Care Daily Checklist:    A: Awake: RASS Goal/Actual Goal: RASS Goal: 0-->alert and calm  Actual:     B: Spontaneous Breathing Trial Performed? Spon. Breathing Trial Initiated?: Initiated (07/16/24 1003)   C: SAT & SBT Coordinated?  None                      D: Delirium: CAM-ICU Overall CAM-ICU: Negative   E: Early Mobility Performed? No   F: Feeding Goal: Goals: Meet % EEN, EPN by RD f/u date  Status: Nutrition Goal Status: progressing towards goal   Current Diet Order   Procedures    Diet Renal      AS: Analgesia/Sedation None   T: Thromboembolic Prophylaxis Heparin   H: HOB > 300 Yes   U: Stress Ulcer Prophylaxis (if needed) None   G: Glucose Control SSI   B: Bowel Function Stool Occurrence: 1   I: Indwelling Catheter (Lines & Damon) Necessity Rectal Tube, LIJ, Left femoral line   D: De-escalation of Antimicrobials/Pharmacotherapies Vancomycin and Zosyn    Plan for the day/ETD Continue antibiotics, GOC    Code Status:  Family/Goals of Care: Full Code         Critical secondary to Patient has a condition that poses threat to life and bodily function: septic shock      Critical care was time spent personally by me on the following activities: development of treatment plan with patient or surrogate and bedside caregivers, discussions with consultants,  evaluation of patient's response to treatment, examination of patient, ordering and performing treatments and interventions, ordering and review of laboratory studies, ordering and review of radiographic studies, pulse oximetry, re-evaluation of patient's condition. This critical care time did not overlap with that of any other provider or involve time for any procedures.     Mawadah Samad, MD  Critical Care Medicine  [facility] - Cardiac Medical ICU

## 2024-08-02 NOTE — ASSESSMENT & PLAN NOTE
- PD catheter removed 07/15  - Tunneled dialysis catheter was not functioning and therefore removed on 8/1 by nephrology  - Trialysis catheter placed in left fem on 7/20  - Tolerating iHD  - Start albumin 25 g q8h x 3 days per Nephrology, ending 7/25   - Attempted iHD on 8/1 but resistance with left fem trialysis line; only able to complete ~20 minutes. Will require new trialysis line to be placed by anesthesiology per nephrology recs

## 2024-08-02 NOTE — PROGRESS NOTES
Pharmacokinetic Initial Assessment: IV Vancomycin    Assessment/Plan:    -Initiate intravenous vancomycin with loading dose of 2000 mg once   -Will dose vancomycin based on levels due to renal function   -Desired empiric serum trough concentration is 15 to 20 mcg/mL  -Draw vancomycin random level on 08/03 at 0300.    Pharmacy will continue to follow and monitor vancomycin.      Please contact pharmacy at extension [phone] with any questions regarding this assessment.     Thank you for the consult,   Alan GiordanoD       Patient brief summary:  Juhi Taylor is a 74 y.o. female initiated on antimicrobial therapy with IV Vancomycin for treatment of suspected sepsis    Drug Allergies:   Review of patient's allergies indicates:   Allergen Reactions    Percocet [oxycodone-acetaminophen] Itching    Januvia [sitagliptin]     Jardiance [empagliflozin]      Leg cramps    Lipitor [atorvastatin] Other (See Comments)     Severe leg pain    Linaclotide Other (See Comments) and Nausea And Vomiting     Does not remember    Lubiprostone Other (See Comments) and Palpitations     Does not remember       Actual Body Weight:   128.5 kg    Renal Function:   Estimated Creatinine Clearance: 10.9 mL/min (A) (based on SCr of 6.5 mg/dL (H)).,     Dialysis Method (if applicable):  intermittent HD

## 2024-08-02 NOTE — ASSESSMENT & PLAN NOTE
- AFib with RVR appears new following cardiac arrest at OSH  - Patient endorses amiodarone reaction with itching, rash, and oral burning. Refused Sotalol, DCCV, and AICD placement at OSH.   - Was placed on Diltizem infusion and transitioned to PO metoprolol. Amio gtt restarted overnight 7/11 s/t uncontrolled afib with RVR. Transitioned to PO amio  - Patient's rate remained elevated despite amiodarone. Appeared to respond better to metoprolol.   - Hold lopressor in setting of septic shock  - Apixaban on hold. Anticoagulation with a heparin infusion

## 2024-08-02 NOTE — ASSESSMENT & PLAN NOTE
CCM re-consulted on the morning of 8/2 for hypotensive episode not resolved with ~150mL NS. Only received ~20 minutes of iHD yesterday s/t difficulty with fem trialysis line. Also C.diff +. Suspect hypotension s/t sepsis vs. Hypovolemic (due to increased stool output). Sating 100 on 2L NC O2; lungs clear in upper lobes to auscultation.    - Received 150mL NS per primary team  - Additional 500mL NS to be given over 1 hour  - Strict I/Os  - Will admit to MICU for vasopressors if remains hypotensive post fluid bolus

## 2024-08-02 NOTE — NURSING
Pt BP post bolus 69/42 Provider notified, Rapid Team notified, orders to transfer to MICU. Family at bedside notified

## 2024-08-02 NOTE — ASSESSMENT & PLAN NOTE
>>ASSESSMENT AND PLAN FOR IDIOPATHIC HYPOTENSION WRITTEN ON 8/2/2024  2:35 AM BY LONA ARRIOLA, AGACNP-BC    CCM re-consulted on the morning of 8/2 for hypotensive episode not resolved with ~150mL NS. Only received ~20 minutes of iHD yesterday s/t difficulty with fem trialysis line. Also C.diff +. Suspect hypotension s/t sepsis vs. Hypovolemic (due to increased stool output). Sating 100 on 2L NC O2; lungs clear in upper lobes to auscultation.    - Received 150mL NS per primary team  - Additional 500mL NS to be given over 1 hour  - Strict I/Os  - Will admit to MICU for vasopressors if remains hypotensive post fluid bolus

## 2024-08-02 NOTE — ASSESSMENT & PLAN NOTE
CCM re-consulted on the morning of 8/2 for hypotensive episode not resolved with ~150mL NS. Only received ~20 minutes of iHD yesterday s/t difficulty with fem trialysis line. Also C.diff +. Suspect hypotension s/t sepsis vs. Hypovolemic (due to increased stool output). Sating 100 on 2L NC O2; lungs clear in upper lobes to auscultation.    - Received 150mL NS per primary team  - Additional 500mL NS to be given over 1 hour  - Strict I/Os  - Start levo for hypotension, MAP > 65

## 2024-08-02 NOTE — PROGRESS NOTES
Please see previous notes from this SW for continuity.    __________________________________________________  SW received notification via [facility] online portal, pt accepted for OP dialysis at [facility] [address].    Please see below for pt's outpt dialysis information:    -[facility] [place]  [address] [place]  [phone]  -Schedule: MWF at 2:45PM  -Anticipated start date: Monday, 08/05/2024  **Pt must arrive 30 minutes early to initial appt to sign consents**    Inpt treatment team updated via secure chat.    NAVEEN to follow for pt's discharge.    Nakia Alvarado, ENRIQUE  [facility]  [phone]

## 2024-08-02 NOTE — SUBJECTIVE & OBJECTIVE
Past Medical History:   Diagnosis Date    Anticoagulant long-term use     Arthritis     Breast cancer 2014    invasive lobular carcinoma    Cancer of kidney 11/2020    RIGHT KIDNEY CANCER    CHF (congestive heart failure)     Coronary artery disease dx 2005    Depression     Diabetes mellitus     Diastolic heart failure secondary to hypertension     Gout     Hyperlipemia     Hypertension     Hypertrophy of nasal turbinates     Kidney mass 2020    Right    Levoscoliosis     Lung nodule     left    Multiple thyroid nodules     NICOLE (obstructive sleep apnea)     uses C-PAP    Pulmonary hypertension     Severe sepsis 07/01/2024       Past Surgical History:   Procedure Laterality Date    AORTOGRAPHY N/A 12/04/2020    Procedure: Aortogram;  Surgeon: Paul Pedersen MD;  Location: [facility] CATH;  Service: Cardiology;  Laterality: N/A;    BREAST SURGERY      BRONCHOSCOPY N/A 7/15/2024    Procedure: BRONCHOSCOPY, FLEXIBLE;  Surgeon: Tanya Beckham MD;  Location: [address];  Service: General;  Laterality: N/A;    CARDIAC CATHETERIZATION  12/2020    CHOLECYSTECTOMY      COLONOSCOPY      multi -last 2014     CORONARY ARTERY BYPASS GRAFT      ESOPHAGOGASTRODUODENOSCOPY      2012     HAND SURGERY Right     INSERTION OF CATHETER N/A 6/23/2024    Procedure: Insertion,catheter;  Surgeon: Meli Griffin MD;  Location: [facility] OR;  Service: Vascular;  Laterality: N/A;  ORIGINAL CATHETER WAS REPOSITIONED.  NO NEW CATHETER WAS PLACED    INSERTION OF TUNNELED CENTRAL VENOUS HEMODIALYSIS CATHETER Right 7/11/2024    Procedure: Insertion, Catheter, Central Venous, Hemodialysis;  Surgeon: Hawa Landa MD;  Location: [facility] CATH LAB;  Service: Interventional Nephrology;  Laterality: Right;    INSERTION, CATHETER, DIALYSIS, PERITONEAL N/A 6/4/2024    Procedure: IN Insertion Catheter, Dialysis, Peritoneal - laparoscopic;  Surgeon: Rodriguez Catalan Jr., MD;  Location: [facility];  Service: General;  Laterality: N/A;    LAPAROSCOPIC  "ROBOT-ASSISTED SURGICAL REMOVAL OF KIDNEY USING DA CHELLE XI Right 03/10/2022    Procedure: XI ROBOTIC NEPHRECTOMY- radical;  Surgeon: Rolando Ramirez MD;  Location: [facility] OR;  Service: Urology;  Laterality: Right;    MASTECTOMY W/ SENTINEL NODE BIOPSY Bilateral 01/21/2015    bilateral "dog ears"    NASAL SINUS SURGERY  2015    Dr Bryant FESS/cauterization turbinate     PARTIAL HYSTERECTOMY  1989    PERCUTANEOUS TRANSLUMINAL BALLOON ANGIOPLASTY OF CORONARY ARTERY  12/04/2020    Procedure: Angioplasty-coronary;  Surgeon: Paul Pedersen MD;  Location: [facility] CATH;  Service: Cardiology;;    PERITONEAL CATHETER REMOVAL N/A 7/15/2024    Procedure: REMOVAL, CATHETER, DIALYSIS, PERITONEAL;  Surgeon: Tanya Beckham MD;  Location: [facility] OR [address] [place] [address];  Service: General;  Laterality: N/A;    REMOVAL OF HEMODIALYSIS CATHETER  7/11/2024    Procedure: REMOVAL, CATHETER, HEMODIALYSIS;  Surgeon: Hawa Landa MD;  Location: [facility] CATH LAB;  Service: Interventional Nephrology;;    RENAL BIOPSY Right     9/20/2021 EJ    TUBAL LIGATION      ULTRASOUND GUIDANCE  12/04/2020    Procedure: Ultrasound Guidance;  Surgeon: Paul Pedersen MD;  Location: [facility] CATH;  Service: Cardiology;;       Review of patient's allergies indicates:   Allergen Reactions    Percocet [oxycodone-acetaminophen] Itching    Januvia [sitagliptin]     Jardiance [empagliflozin]      Leg cramps    Lipitor [atorvastatin] Other (See Comments)     Severe leg pain    Linaclotide Other (See Comments) and Nausea And Vomiting     Does not remember    Lubiprostone Other (See Comments) and Palpitations     Does not remember       Family History       Problem Relation (Age of Onset)    Asthma Daughter    Birth defects Daughter    Breast cancer Mother, Maternal Aunt    Depression Daughter    Drug abuse Daughter, Daughter    Glaucoma Sister    Hepatitis Brother    Hypertension Father    Learning disabilities Daughter    Mental illness Daughter    Stroke Father      "     Tobacco Use    Smoking status: Former     Current packs/day: 0.00     Types: Cigarettes     Start date: 2016     Quit date: 2016     Years since quittin.6    Smokeless tobacco: Never    Tobacco comments:     quit    Substance and Sexual Activity    Alcohol use: No    Drug use: No    Sexual activity: Not Currently     Partners: Male     Birth control/protection: None      Review of Systems   Unable to perform ROS: Mental status change     Objective:     Vital Signs (Most Recent):  Temp: 98.7 °F (37.1 °C) (24 2335)  Pulse: 92 (24 003)  Resp: (!) 24 (24 003)  BP: (!) 82/47 (24)  SpO2: 97 % (24) Vital Signs (24h Range):  Temp:  [97.5 °F (36.4 °C)-98.7 °F (37.1 °C)] 98.7 °F (37.1 °C)  Pulse:  [] 92  Resp:  [19-29] 24  SpO2:  [93 %-100 %] 97 %  BP: ()/(43-59) 82/47   Weight: 128.5 kg (283 lb 4.7 oz)  Body mass index is 44.37 kg/m².      Intake/Output Summary (Last 24 hours) at 2024 0103  Last data filed at 2024 0045  Gross per 24 hour   Intake 2224.48 ml   Output 380 ml   Net 1844.48 ml          Physical Exam  Vitals and nursing note reviewed.   Constitutional:       General: She is sleeping.      Appearance: She is morbidly obese. She is ill-appearing.   HENT:      Mouth/Throat:      Mouth: Mucous membranes are dry.      Pharynx: Oropharynx is clear.   Eyes:      Extraocular Movements: Extraocular movements intact.      Pupils: Pupils are equal, round, and reactive to light.   Cardiovascular:      Rate and Rhythm: Tachycardia present. Rhythm irregular.      Comments: Afib  Pulmonary:      Effort: Pulmonary effort is normal.      Breath sounds: Examination of the right-lower field reveals decreased breath sounds. Examination of the left-lower field reveals decreased breath sounds. Decreased breath sounds present.   Abdominal:      General: There is distension.      Palpations: Abdomen is soft.   Genitourinary:     Comments: Damon and  Flexi  Musculoskeletal:         General: Swelling present.      Right lower leg: 3+ Edema present.      Left lower leg: 3+ Edema present.      Comments: Trialysis line to groin   Skin:     General: Skin is warm and dry.      Capillary Refill: Capillary refill takes less than 2 seconds.   Neurological:      General: No focal deficit present.      Mental Status: She is easily aroused. She is lethargic and disoriented.      GCS: GCS eye subscore is 3. GCS verbal subscore is 4. GCS motor subscore is 6.   Psychiatric:         Behavior: Behavior is cooperative.            Vents:  Vent Mode: Spont (07/16/24 1003)  Set Rate: 15 BPM (07/16/24 0903)  Vt Set: 420 mL (07/16/24 0903)  Pressure Support: 5 cmH20 (07/16/24 1003)  PEEP/CPAP: 5 cmH20 (07/16/24 1003)  Oxygen Concentration (%): 32 (07/25/24 1755)  Peak Airway Pressure: 10 cmH20 (07/16/24 1003)  Plateau Pressure: 0 cmH20 (07/16/24 1003)  Total Ve: 5.45 L/m (07/16/24 1003)  Negative Inspiratory Force (cm H2O): 0 (07/16/24 1003)  Lines/Drains/Airways       Central Venous Catheter Line  Duration             Trialysis (Dialysis) Catheter 07/20/24 1120 left femoral 12 days              Drain  Duration                  Rectal Tube 07/28/24 0000 5 days              Peripheral Intravenous Line  Duration                  Peripheral IV - Single Lumen 07/28/24 1931 22 G Left Antecubital 4 days                  Significant Labs:    CBC/Anemia Profile:  Recent Labs   Lab 07/31/24  0709 08/01/24  0647   WBC 21.43* 25.44*   HGB 7.5* 7.8*   HCT 24.8* 26.6*    169   MCV 87 91   RDW 22.6* 23.2*        Chemistries:  Recent Labs   Lab 07/31/24  0709 08/01/24  0647   * 135*   K 4.5 3.9    108   CO2 14* 15*   BUN 55* 55*   CREATININE 6.5* 6.4*   CALCIUM 8.9 9.0   ALBUMIN 1.7* 1.8*   MG 2.1 2.1   PHOS 5.4*  5.4* 5.2*  5.2*       All pertinent labs within the past 24 hours have been reviewed.    Significant Imaging: I have reviewed all pertinent imaging results/findings  within the past 24 hours.

## 2024-08-02 NOTE — EICU
Intervention Initiated From:  Bedside    Cristela intervened regarding:  Time-Out    Nurse Notified:  Yes    Doctor Notified:  Yes    Comments: E-lert for time out for placement left IJ trialysis catheter. Catheter placement confirmed by ultrasound, manometry, blood return all ports. Chest x-ray ordered. Patient tolerated procedure well

## 2024-08-02 NOTE — ASSESSMENT & PLAN NOTE
Aerobic culture positive for pseudomonas. Likely infected left femoral line    - Tentatively plan to remove left femoral line  - Vancomycin and Zosyn ordered  - Levophed MAP > 65  - Repeat blood cultures ordered

## 2024-08-02 NOTE — ASSESSMENT & PLAN NOTE
- consult received for evaluation of skin injury.  - pt transferred to [facility] from [facility] for a higher level of care. She originally presented to [facility] ED on 6/18 for worsening SOB and was found to be satting 89% on RA determined to be due to a heart failure exacerbation.   - suspected deep tissue injury to sacrum now revealing itself with pink, red, maroon moist wound base. Partial thickness tissue loss noted to wound bed. Will monitor as wound continues to reveal itself.  - continue Triad bid/prn.  - pt has rectal tube in place with stool leakage. On treatment for c diff.  - anuric.  - Immerse surface.  - heel boots and pillows for offloading.  - turn q2h.  - jr score documented at 11 with a nutrition sub scale score of 2.  - RD following.   - nursing to maintain pressure injury prevention measures and continue wound care per orders.

## 2024-08-02 NOTE — NURSING
Nurses Note -- 4 Eyes      8/2/2024   2:41 AM      Skin assessed during: Admit      [] No Altered Skin Integrity Present    []Prevention Measures Documented      [x] Yes- Altered Skin Integrity Present or Discovered   [x] LDA Added if Not in Epic (Describe Wound)   [] New Altered Skin Integrity was Present on Admit and Documented in LDA   [x] Wound Image Taken    Wound Care Consulted? Yes    Attending Nurse:  Negar Strickland RN/Staff Member:  Inna

## 2024-08-02 NOTE — ASSESSMENT & PLAN NOTE
Aerobic cultures positive for pseudomonas. Likely source of infection from left femoral line  - Tentative plan to remove left femoral line  - Vancomycin and Zosyn ordered  - Levophed MAP > 65

## 2024-08-02 NOTE — AI DETERIORATION ALERT
RAPID RESPONSE NURSE PROACTIVE ROUNDING NOTE       Time of Visit: 2350    Admit Date: 7/3/2024  LOS: 30  Code Status: Full Code   Date of Visit: 2024  : 1950  Age: 74 y.o.  Sex: female  Race: Black or   Bed: 75054/93611 A:   MRN: [id number]  Was the patient discharged from an ICU this admission? Yes   Was the patient discharged from a PACU within last 24 hours? No   Did the patient receive conscious sedation/general anesthesia in last 24 hours? No  Was the patient in the ED within the past 24 hours? No  Was the patient on NIPPV within the past 24 hours? No   Attending Physician: Amarilis Prieto MD  Primary Service: [facility] MED A   Time spent at the bedside: 45 - 60 min    SITUATION    Notified by 3TIER patient alert.  Reason for alert: hypotension  Called to evaluate the patient for Circulatory    BACKGROUND     Why is the patient in the hospital?: Clostridium difficile infection    Patient has a past medical history of Anticoagulant long-term use, Arthritis, Breast cancer, Cancer of kidney, CHF (congestive heart failure), Coronary artery disease, Depression, Diabetes mellitus, Diastolic heart failure secondary to hypertension, Gout, Hyperlipemia, Hypertension, Hypertrophy of nasal turbinates, Kidney mass, Levoscoliosis, Lung nodule, Multiple thyroid nodules, NICOLE (obstructive sleep apnea), Pulmonary hypertension, and Severe sepsis.    Last Vitals:  Temp: 98.7 °F (37.1 °C) ( 2335)  Pulse: 92 ( 0030)  Resp: 24 (0)  BP: 82/47 ( 0030)  SpO2: 97 % (30)    24 Hours Vitals Range:  Temp:  [97.5 °F (36.4 °C)-98.7 °F (37.1 °C)]   Pulse:  []   Resp:  [19-29]   BP: ()/(43-59)   SpO2:  [93 %-100 %]     Labs:  Recent Labs     24  0138 24  0709 24  0647   WBC 19.15* 21.43* 25.44*   HGB 7.4* 7.5* 7.8*   HCT 23.6* 24.8* 26.6*    204 169       Recent Labs     24  0138 24  0709 24  0647    135* 135*   K 3.9  4.5 3.9    108 108   CO2 16* 14* 15*   BUN 49* 55* 55*   CREATININE 6.3* 6.5* 6.4*    94 89   PHOS 4.8*  4.8* 5.4*  5.4* 5.2*  5.2*   MG 2.1 2.1 2.1          ASSESSMENT     Physical Exam  Constitutional:       Appearance: She is ill-appearing.   Cardiovascular:      Rate and Rhythm: Rhythm irregular.   Pulmonary:      Effort: Tachypnea present.      Breath sounds: Decreased breath sounds present.   Musculoskeletal:      Right lower leg: Edema present.      Left lower leg: Edema present.   Skin:     General: Skin is cool and dry.      Capillary Refill: Capillary refill takes 2 to 3 seconds.      Coloration: Skin is pale.      Findings: Bruising present.   Neurological:      Mental Status: She is lethargic and disoriented.       BP 79/47 (58)  RR 24, SpO2 96% on 2LNC      Patient lying in bed, lethargic. Opens eyes to voice, answers basic questions/follows basic commands. Disoriented to time. Mildly tachypneic at rest    INTERVENTIONS    The patient was seen for Cardiac problem. Staff concerns included hypotension. The following interventions were performed: continuous cardiac monitoring continued, continuous pulse ox monitoring continued, normal saline 750 ml IV bolus , and critical care consult.    NS bolus initiated    Patient BP has been soft the last ~ 2days with steady downward trend. Last HD 7/31, though unable to remove any fluid d/t hypotension. HD attempted again today but unfortunately unsuccessful d/t clotting of L femoral trialysis line    RECOMMENDATIONS    Repeat BP post fluid bolus  Notify RRN, primary team if patient remains BP worsens or remains low despite gentle fluid resuscitation   Low threshold to consider ICU upgrade    PROVIDER ESCALATION    Yes/No  Yes    Orders received and case discussed with Dr. Lamb with DEEJAY VILLASENOR NP with [facility] .    Disposition: Remain in room 09736.    FOLLOW-UP    Bedside RNBerenice, charge NEENA Haney  updated on plan of care.  Instructed to call the Rapid Response NurseAMINATA RN at [phone] for additional questions or concerns.

## 2024-08-02 NOTE — PROGRESS NOTES
[facility] - Cardiac Medical ICU  Nephrology  Progress Note    Patient Name: Juhi Taylor  MRN: [id number]  Admission Date: 7/3/2024  Hospital Length of Stay: 30 days  Attending Provider: Bayron Borja MD   Primary Care Physician: Tony Sadler MD  Principal Problem:Clostridium difficile infection    Subjective:       Interval History:  Pt stepped up to ICU yesterday due to hypotension. Now on levo .1. Received only ~ 20 minutes of HD yesterday due to malfunctioning femoral catheter. TDC was removed 8/1. Plan to replace TDC once WBC improves. Last full HD tx on 7/31, however with low BFR. Net +2.6L.              Review of patient's allergies indicates:   Allergen Reactions    Percocet [oxycodone-acetaminophen] Itching    Januvia [sitagliptin]      Jardiance [empagliflozin]         Leg cramps    Lipitor [atorvastatin] Other (See Comments)       Severe leg pain    Linaclotide Other (See Comments) and Nausea And Vomiting       Does not remember    Lubiprostone Other (See Comments) and Palpitations       Does not remember           Current Facility-Administered Medications   Medication Frequency    0.9%  NaCl infusion (for blood administration) Q24H PRN    acetaminophen tablet 650 mg Q6H PRN    cholestyramine 4 gram packet 4 g BID    dextromethorphan-guaiFENesin  mg/5 ml liquid 5 mL Q4H PRN    dextrose 10% bolus 125 mL 125 mL PRN    dextrose 10% bolus 250 mL 250 mL PRN    diphenhydrAMINE capsule 25 mg Q6H PRN    famotidine tablet 20 mg Daily    ferrous sulfate tablet 1 each Daily    fidaxomicin tablet 200 mg BID    glucagon (human recombinant) injection 1 mg PRN    glucose chewable tablet 16 g PRN    glucose chewable tablet 24 g PRN    heparin 25,000 units in dextrose 5% (100 units/ml) IV bolus from bag LOW INTENSITY nomogram - [facility] PRN    heparin 25,000 units in dextrose 5% (100 units/ml) IV bolus from bag LOW INTENSITY nomogram - [facility] PRN    heparin 25,000 units in dextrose 5% 250 mL (100  units/mL) infusion LOW INTENSITY nomogram - [facility] Continuous    hyoscyamine SL tablet 0.125 mg Q4H PRN    insulin aspart U-100 pen 0-10 Units QID (AC + HS) PRN    LIDOcaine 5 % patch 2 patch Q24H    metoprolol injection 5 mg Q6H PRN    metoprolol tartrate (LOPRESSOR) tablet 50 mg BID    miconazole NITRATE 2 % top powder BID    midodrine tablet 10 mg Daily PRN    naloxone 0.4 mg/mL injection 0.02 mg PRN    ondansetron injection 4 mg Q6H PRN    sacubitriL-valsartan 24-26 mg per tablet 1 tablet BID    simethicone chewable tablet 80 mg TID PRN    sodium chloride 0.9% bolus 250 mL 250 mL PRN    sodium chloride 0.9% flush 10 mL PRN    traMADoL tablet 50 mg Q8H PRN         Objective:      Vital Signs (Most Recent):  Temp: 97.7 °F (36.5 °C) (07/30/24 1137)  Pulse: (!) 117 (07/30/24 1137)  Resp: 20 (07/30/24 1137)  BP: 127/63 (07/30/24 1137)  SpO2: 99 % (07/30/24 1137) Vital Signs (24h Range):  Temp:  [97.7 °F (36.5 °C)-98.8 °F (37.1 °C)] 97.7 °F (36.5 °C)  Pulse:  [] 117  Resp:  [18-20] 20  SpO2:  [96 %-99 %] 99 %  BP: ()/(50-74) 127/63      Weight: 128.5 kg (283 lb 4.7 oz) (07/24/24 1247)  Body mass index is 44.37 kg/m².  Body surface area is 2.46 meters squared.          Physical Exam  Constitutional:       General: She is not in acute distress.     Appearance: Normal appearance.   Cardiovascular:      Rate and Rhythm: Normal rate.      Pulses: Normal pulses.   Pulmonary:      Effort: Pulmonary effort is normal. No respiratory distress.      Breath sounds: No wheezing or rhonchi.   Musculoskeletal:      Right lower leg:edema.      Left lower leg: edema.   Skin:     General: Skin is warm.   Neurological:      General: No focal deficit present.      Mental Status: Disoriented            Significant Labs:  BMP:       Recent Labs   Lab 07/30/24 0138         K 3.9      CO2 16*   BUN 49*   CREATININE 6.3*   CALCIUM 8.9   MG 2.1      CBC:       Recent Labs   Lab 07/30/24 0138   WBC 19.15*   RBC  2.79*   HGB 7.4*   HCT 23.6*      MCV 85   MCH 26.5*   MCHC 31.4*         Significant Imaging:  Labs: Reviewed  ECG: Reviewed  X-Ray: Reviewed  US: Reviewed  CT: Reviewed  MRI: Reviewed  Assessment/Plan:      Cardiac/Vascular  Acute on chronic combined systolic and diastolic heart failure  Per primary     Renal/  ESRD (end stage renal disease)  ESRD - was briefly on PD then HD, having issues w tunnel cath and now w trialysis in place in [facility]. S/p R nephrectomy RCC  C diff positive - on oral vanc  HTN  DM  Afib w rvr  CHF w ischemic cardiomyopathy  S/p cardiac arrest      Plan/Recommendations    - 12 hour SLED today for metabolic clearance, UF goal 0.5L. Decrease UF if increase in pressor support.      Anemia in ESRD;  .Hemoglobin goal in ESRD is 10-12g/dl  Epo with HD   .        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        Lab Results   Component Value Date     .7 (H) 05/08/2024     CALCIUM 8.9 07/30/2024     CAION 1.02 (L) 07/20/2024     PHOS 4.8 (H) 07/30/2024     PHOS 4.8 (H) 07/30/2024               Thank you for your consult. I will follow-up with patient. Please contact us if you have any additional questions.    Frida San NP   Nephrology      Assessment/Plan:     Renal/  ESRD (end stage renal disease)  ESRD - was briefly on PD then HD, having issues w tunnel cath (removed on 7/31) and now w trialysis in place in [facility] region. S/p R nephrectomy RCC  C diff  positive - on oral vanc  HTN  DM  Afib w rvr  CHF w ischemic cardiomyopathy  S/p cardiac arrest     Plan/Recommendations    -Plan for 12 hour SLED. UF goal 500ml. Decrease UF if increase in pressor support   -Continue to monitor intake and output     Anemia in ESRD;  .Hemoglobin goal in ESRD is 10-12g/dl  Epo with HD   .  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  Lab Results   Component Value Date    .7 (H) 05/08/2024    CALCIUM 8.7 08/02/2024    CAION 1.02 (L) 07/20/2024    PHOS 5.7 (H) 08/02/2024    PHOS 5.7 (H) 08/02/2024        ID  * Clostridium difficile infection  -management per primary         Thank you for your consult. I will follow-up with patient. Please contact us if you have any additional questions.    Frida San DNP  Nephrology  [facility] - Cardiac Medical ICU

## 2024-08-02 NOTE — HPI
Juhi Taylor is a 74 year old female with a PMHx of CAD, ESRD on PD, HFrEF of 40%, and afib on eliquis who was transferred to [facility] from [facility] for a higher level of care. She originally presented to [facility] ED on 6/18 for worsening SOB and was found to be satting 89% on RA determined to be due to a heart failure exacerbation.  She was given lasix gtt and metolazone but still produced insufficient urine. HD was started, but during a dialysis session on 6/24 she suffered PEA cardiac arrest, epi was administered and she converted to V tach. After cardioversion ROSC was achieved and she was intubated and transferred to the ICU. Repeat Echo revealed an EF of 20-25% from 40% on admission but cardiology did not intervene at this time to due to her clinical condition but recommended later evaluation for ICD and angiogram. Her condition improved with further HD. She was extubated and moved to the floor but developed an uptrending leukocytosis possibly originating from a left trialysis catheter with bruising and possible purulent discharge. The line was removed. She was determined to not be a candidate for hospice. The family requested transfer to [facility] for a higher level of care. Patient admitted to hospital medicine service for further management. Skin integrity PABLO consulted for evaluation of skin injury.

## 2024-08-02 NOTE — CONSULTS
[facility] - Cardiac Medical ICU  Critical Care Medicine  Consult Note    Patient Name: Juhi Taylor  MRN: [id number]  Admission Date: 7/3/2024  Hospital Length of Stay: 30 days  Code Status: Full Code  Attending Physician: Amarilis Prieto MD   Primary Care Provider: Tony Sadler MD   Principal Problem: Clostridium difficile infection    Inpatient consult to Critical Care Medicine  Consult performed by: Medina Clayton [facility]  Consult ordered by: Amarilis Prieto MD  Reason for consult: Hypotension        Subjective:     HPI:  Juhi Taylor is a 74-year-old female with a past medical history of CKD stage 5 with recent PD catheter placement on 6/4, RCC s/p right nephrectomy, T2DM, obesity, CAD s/p PCI to Lcx and LAD in 11/2020 and HFmREF who is admitted as a transfer from [facility] for higher level of care and further cardiac evaluation. Nephrology consulted for hemodialysis.     She initially presented on 6/18/24 to [facility] for hypervolemia in the setting of CKD 4-5 for which she had undergone elective PD catheter placement complicated by catheter site leaking. She had a complex hospital course, was initially placed on furosemide gtt without significant improvement and later underwent placement of tunneled line for HD with volume removal. During her second dialysis session she went into PEA arrest for which she was resuscitated, intubated and transferred to ICU. She was noted to have elevated troponin and subsequent TTE found reduced EF from 40-45% to 20-25% for which coronary angiography was recommended. Her hospital course was also complicated by dialysis line infection, significant bleeding around the catheter site, bradycardia and subsequent atrial fibrillation with RVR. Given the catheter site bleeding, anticoagulation was temporarily held. She has reported allergy to amiodarone, was initially placed on diltiazem for atrial fibrillation despite reduced EF. She discussed GOC at  outside hospital where it appears that she lacked capacity per note and decided to pursue higher level of care at [facility]. At that time, General Surgery was planning to place a left tunneled catheter due to concerns of the right side not being suitable     Upon admit, patient appeared drowsy but conversant. Daughter reports that she typically gets lethargic 2/2 to the volume overload due to not receiving dialysis. Patient reported b/l left lower extremity pain. Critical Care Medicine was consulted for emergent trialysis line placement and dialysis.     Ms. Taylor was admitted to MICU on 7/8/24 for urgent dialysis line placement and initiation of dialysis. Patient received short run of CRRT overnight without issue. Encephalopathy improving with continued CRRT treatments. She has had persistent AFib with RVR for which received Digoxin loading dose (renally dosed) and was restarted PO carvedilol. Patient remains in persistent AFib RVR despite therapeutic digoxin level and uptitration of carvedilol dosages so she was started on an amiodarone infusion. She was then transitioned to amio PO and started on heparin infusion for atrial fibrillation. General Surgery removed her peritoneal dialysis catheter on 7/15/24, but upon induction she vomited copious amounts of bile and was endotracheally intubated. She was extubated to nasal cannula on 7/16/24.     She has had difficulties with her tunneled HD line with sluggish flow especially through the venous side.  She would have a 4-hour instillation of tPA in each line before being able to run iHD, and even then it would be stopped before completion.  Interventional Nephrology plans to replace the line at some point next week.  Of note, Palliative Care was also consulted and patient had made her wishes known that she is a DNR code status.  She was still vacillating between replacing her tunneled HD line given her adverse event during her last procedure or going home with hospice.   The family was upset with her decision and April, her niece, asked that Palliative Care not return to see the patient.      Hospital/ICU Course:  Ms. Taylor was admitted to MICU on 7/8 for urgent dialysis line placement and initiated of HD. Patient received short run of CRRT overnight without issue. Encephalopathy improving with continued CRRT treatments. Persistent AFib with RVR, received Digoxin loading dose (renally dosed), restarted PO Carvedilol. Patient remains in persistent AFib RVR despite therapeutic digoxin level and uptitration of Carvedilol levels, started on Amiodarone infusion overnight. Transitioned to amio PO, started on heparin gtt for afib AC. Tolerated HD well overnight without complications and less encephalopathic upon exam. Planning for gen surg to remove PD catheter. Upon induction pt vomited copious amounts of bile, was intubated. PD cathter removed successfully. Extubated to NC 7/16.     7/18:  Patient made DNR after Palliative Care discussions yesterday.  Tolerated SLED 12 hours overnight.  Discussed difficulties regarding HD line with Interventional Nephrology and plans to replace it next week if patient it amenable.  Patient was stepped down from ICU.  7/19:  Stepped back up to medical ICU with plans for overnight SLED x 3 days.    On 7/19 Nephrology requested that the patient be stepped back up to the medical ICU for SLED. A temporary trialysis HD catheter was placed on 7/20/24 in the groin. She also began having diarrhea, which tested positive for Clostridium difficile so she was started on PO vancomycin 7/22/24. HD trial on 7/24; remained HDS. Ready for stepdown.    Stepped down to  on 7/26/24. On 8/2/2024, [facility] re-consulted for hypotension (70s/40s) and increased somnolence.    Past Medical History:   Diagnosis Date    Anticoagulant long-term use     Arthritis     Breast cancer 2014    invasive lobular carcinoma    Cancer of kidney 11/2020    RIGHT KIDNEY CANCER    CHF (congestive  heart failure)     Coronary artery disease dx 2005    Depression     Diabetes mellitus     Diastolic heart failure secondary to hypertension     Gout     Hyperlipemia     Hypertension     Hypertrophy of nasal turbinates     Kidney mass 2020    Right    Levoscoliosis     Lung nodule     left    Multiple thyroid nodules     NICOLE (obstructive sleep apnea)     uses C-PAP    Pulmonary hypertension     Severe sepsis 07/01/2024       Past Surgical History:   Procedure Laterality Date    AORTOGRAPHY N/A 12/04/2020    Procedure: Aortogram;  Surgeon: Paul Pedersen MD;  Location: [facility] CATH;  Service: Cardiology;  Laterality: N/A;    BREAST SURGERY      BRONCHOSCOPY N/A 7/15/2024    Procedure: BRONCHOSCOPY, FLEXIBLE;  Surgeon: Tanya Beckham MD;  [facility];  Service: General;  Laterality: N/A;    CARDIAC CATHETERIZATION  12/2020    CHOLECYSTECTOMY      COLONOSCOPY      multi -last 2014     CORONARY ARTERY BYPASS GRAFT      ESOPHAGOGASTRODUODENOSCOPY      2012     HAND SURGERY Right     INSERTION OF CATHETER N/A 6/23/2024    Procedure: Insertion,catheter;  Surgeon: Meli Griffin MD;  Location: [facility] OR;  Service: Vascular;  Laterality: N/A;  ORIGINAL CATHETER WAS REPOSITIONED.  NO NEW CATHETER WAS PLACED    INSERTION OF TUNNELED CENTRAL VENOUS HEMODIALYSIS CATHETER Right 7/11/2024    Procedure: Insertion, Catheter, Central Venous, Hemodialysis;  Surgeon: Hawa Landa MD;  Location: [facility] CATH LAB;  Service: Interventional Nephrology;  Laterality: Right;    INSERTION, CATHETER, DIALYSIS, PERITONEAL N/A 6/4/2024    Procedure: IN Insertion Catheter, Dialysis, Peritoneal - laparoscopic;  Surgeon: Rodriguez Catalan Jr., MD;  Location: [facility] OR;  Service: General;  Laterality: N/A;    LAPAROSCOPIC ROBOT-ASSISTED SURGICAL REMOVAL OF KIDNEY USING DA CHELLE XI Right 03/10/2022    Procedure: XI ROBOTIC NEPHRECTOMY- radical;  Surgeon: Rolando Ramirez MD;  Location: [facility] OR;  Service: Urology;  Laterality:  "Right;    MASTECTOMY W/ SENTINEL NODE BIOPSY Bilateral 2015    bilateral "dog ears"    NASAL SINUS SURGERY      Dr Bryant FESS/cauterization turbinate     PARTIAL HYSTERECTOMY      PERCUTANEOUS TRANSLUMINAL BALLOON ANGIOPLASTY OF CORONARY ARTERY  2020    Procedure: Angioplasty-coronary;  Surgeon: Paul Pedersen MD;  Location: [facility] CATH;  Service: Cardiology;;    PERITONEAL CATHETER REMOVAL N/A 7/15/2024    Procedure: REMOVAL, CATHETER, DIALYSIS, PERITONEAL;  Surgeon: Tanya Beckham MD;  Location: [facility] OR [address] [place] [address];  Service: General;  Laterality: N/A;    REMOVAL OF HEMODIALYSIS CATHETER  2024    Procedure: REMOVAL, CATHETER, HEMODIALYSIS;  Surgeon: Hawa Landa MD;  Location: [facility] CATH LAB;  Service: Interventional Nephrology;;    RENAL BIOPSY Right     2021 EJ    TUBAL LIGATION      ULTRASOUND GUIDANCE  2020    Procedure: Ultrasound Guidance;  Surgeon: Paul Pedersen MD;  Location: [facility] CATH;  Service: Cardiology;;       Review of patient's allergies indicates:   Allergen Reactions    Percocet [oxycodone-acetaminophen] Itching    Januvia [sitagliptin]     Jardiance [empagliflozin]      Leg cramps    Lipitor [atorvastatin] Other (See Comments)     Severe leg pain    Linaclotide Other (See Comments) and Nausea And Vomiting     Does not remember    Lubiprostone Other (See Comments) and Palpitations     Does not remember       Family History       Problem Relation (Age of Onset)    Asthma Daughter    Birth defects Daughter    Breast cancer Mother, Maternal Aunt    Depression Daughter    Drug abuse Daughter, Daughter    Glaucoma Sister    Hepatitis Brother    Hypertension Father    Learning disabilities Daughter    Mental illness Daughter    Stroke Father          Tobacco Use    Smoking status: Former     Current packs/day: 0.00     Types: Cigarettes     Start date: 2016     Quit date: 2016     Years since quittin.6    Smokeless tobacco: Never    Tobacco " comments:     quit    Substance and Sexual Activity    Alcohol use: No    Drug use: No    Sexual activity: Not Currently     Partners: Male     Birth control/protection: None      Review of Systems   Unable to perform ROS: Mental status change     Objective:     Vital Signs (Most Recent):  Temp: 98.7 °F (37.1 °C) (08/01/24 2335)  Pulse: 92 (08/02/24 0030)  Resp: (!) 24 (08/02/24 0030)  BP: (!) 82/47 (08/02/24 0030)  SpO2: 97 % (08/02/24 0030) Vital Signs (24h Range):  Temp:  [97.5 °F (36.4 °C)-98.7 °F (37.1 °C)] 98.7 °F (37.1 °C)  Pulse:  [] 92  Resp:  [19-29] 24  SpO2:  [93 %-100 %] 97 %  BP: ()/(43-59) 82/47   Weight: 128.5 kg (283 lb 4.7 oz)  Body mass index is 44.37 kg/m².      Intake/Output Summary (Last 24 hours) at 8/2/2024 0103  Last data filed at 8/2/2024 0045  Gross per 24 hour   Intake 2224.48 ml   Output 380 ml   Net 1844.48 ml          Physical Exam  Vitals and nursing note reviewed.   Constitutional:       General: She is sleeping.      Appearance: She is morbidly obese. She is ill-appearing.   HENT:      Mouth/Throat:      Mouth: Mucous membranes are dry.      Pharynx: Oropharynx is clear.   Eyes:      Extraocular Movements: Extraocular movements intact.      Pupils: Pupils are equal, round, and reactive to light.   Cardiovascular:      Rate and Rhythm: Tachycardia present. Rhythm irregular.      Comments: Afib  Pulmonary:      Effort: Pulmonary effort is normal.      Breath sounds: Examination of the right-lower field reveals decreased breath sounds. Examination of the left-lower field reveals decreased breath sounds. Decreased breath sounds present.   Abdominal:      General: There is distension.      Palpations: Abdomen is soft.   Genitourinary:     Comments: Damon and Flexi  Musculoskeletal:         General: Swelling present.      Right lower leg: 3+ Edema present.      Left lower leg: 3+ Edema present.      Comments: Trialysis line to groin   Skin:     General: Skin is warm and  "dry.      Capillary Refill: Capillary refill takes less than 2 seconds.   Neurological:      General: No focal deficit present.      Mental Status: She is easily aroused. She is lethargic and disoriented.      GCS: GCS eye subscore is 3. GCS verbal subscore is 4. GCS motor subscore is 6.   Psychiatric:         Behavior: Behavior is cooperative.            Vents:  Vent Mode: Spont (07/16/24 1003)  Set Rate: 15 BPM (07/16/24 0903)  Vt Set: 420 mL (07/16/24 0903)  Pressure Support: 5 cmH20 (07/16/24 1003)  PEEP/CPAP: 5 cmH20 (07/16/24 1003)  Oxygen Concentration (%): 32 (07/25/24 1755)  Peak Airway Pressure: 10 cmH20 (07/16/24 1003)  Plateau Pressure: 0 cmH20 (07/16/24 1003)  Total Ve: 5.45 L/m (07/16/24 1003)  Negative Inspiratory Force (cm H2O): 0 (07/16/24 1003)  Lines/Drains/Airways       Central Venous Catheter Line  Duration             Trialysis (Dialysis) Catheter 07/20/24 1120 left femoral 12 days              Drain  Duration                  Rectal Tube 07/28/24 0000 5 days              Peripheral Intravenous Line  Duration                  Peripheral IV - Single Lumen 07/28/24 1931 22 G Left Antecubital 4 days                  Significant Labs:    CBC/Anemia Profile:  Recent Labs   Lab 07/31/24  0709 08/01/24  0647   WBC 21.43* 25.44*   HGB 7.5* 7.8*   HCT 24.8* 26.6*    169   MCV 87 91   RDW 22.6* 23.2*        Chemistries:  Recent Labs   Lab 07/31/24  0709 08/01/24  0647   * 135*   K 4.5 3.9    108   CO2 14* 15*   BUN 55* 55*   CREATININE 6.5* 6.4*   CALCIUM 8.9 9.0   ALBUMIN 1.7* 1.8*   MG 2.1 2.1   PHOS 5.4*  5.4* 5.2*  5.2*       All pertinent labs within the past 24 hours have been reviewed.    Significant Imaging: I have reviewed all pertinent imaging results/findings within the past 24 hours.    ABG  No results for input(s): "PH", "PO2", "PCO2", "HCO3", "BE" in the last 168 hours.  Assessment/Plan:     Cardiac/Vascular  Idiopathic hypotension  CCM re-consulted on the morning of " 8/2 for hypotensive episode not resolved with ~150mL NS. Only received ~20 minutes of iHD yesterday s/t difficulty with fem trialysis line. Also C.diff +. Suspect hypotension s/t sepsis vs. Hypovolemic (due to increased stool output). Sating 100 on 2L NC O2; lungs clear in upper lobes to auscultation.    - Received 150mL NS per primary team  - Additional 500mL NS to be given over 1 hour  - Strict I/Os  - Will admit to MICU for vasopressors if remains hypotensive post fluid bolus    Atrial fibrillation with RVR  - AFib with RVR appears new following cardiac arrest at OSH  - Patient endorses amiodarone reaction with itching, rash, and oral burning. Refused Sotalol, DCCV, and AICD placement at OSH.   - Was placed on Diltizem infusion and transitioned to PO metoprolol. Amio gtt restarted overnight 7/11 s/t uncontrolled afib with RVR. Transitioned to PO amio  - Patient's rate remained elevated despite amiodarone. Appeared to respond better to metoprolol.   - Continue Toprol 50 mg daily  - Apixaban on hold. Anticoagulation with a heparin infusion    Acute on chronic combined systolic and diastolic heart failure  Echo    Left Ventricle: The left ventricle is moderately dilated. Normal wall thickness. Global hypokinesis present. There is severely reduced systolic function with a visually estimated ejection fraction of 20 - 25%.    Right Ventricle: Mild right ventricular enlargement. Wall thickness is normal. Right ventricle wall motion has global hypokinesis. Systolic function is moderately reduced.    Left Atrium: Left atrium is severely dilated.    Right Atrium: Right atrium is severely dilated.    Aortic Valve: The aortic valve is a trileaflet valve. There is mild aortic valve sclerosis.    Mitral Valve: There is mild regurgitation.    Tricuspid Valve: There is severe regurgitation.    Pulmonary Artery: There is moderate pulmonary hypertension. The estimated pulmonary artery systolic pressure is 61 mmHg.    IVC/SVC:  Elevated venous pressure at 15 mmHg.    Pericardium: There is a trivial circumferential effusion. No indication of cardiac tamponade.    - Will require interventional cardiology for LHC vs PET for reduced EF once euvolemic  - LifeVest prior to discharge  - Entresto recommended by cardiology, but currently on hold due to hypotension  - Strict I/Os    Essential hypertension  --Takes carvedilol + clonidine at home.   --Hold antihypertensives due to hypotension.   --Continue metoprolol for rate control.     Renal/  ESRD (end stage renal disease)  - PD catheter removed 07/15  - Tunneled dialysis catheter was not functioning and therefore removed on 8/1 by nephrology  - Trialysis catheter placed in left fem on 7/20  - Tolerating iHD  - Start albumin 25 g q8h x 3 days per Nephrology, ending 7/25   - Attempted iHD on 8/1 but resistance with left fem trialysis line; only able to complete ~20 minutes. Will require new trialysis line to be placed by anesthesiology per nephrology recs    ID  * Clostridium difficile infection  --Diagnosed 7/22/24.  --PO vancomycin 125 mg q6h x 10 days. Stop date: 8/1/24.    Endocrine  Severe obesity (BMI >= 40)  --Morbid obesity complicating her medical care.    Type 2 diabetes mellitus with microalbuminuria, without long-term current use of insulin  --Latest A1C 6.8; takes metformin at home.   --Target -180. Within goal.   --Continue to monitor.     GI  Diarrhea  +C.diff, continue PO vancomycin    - Flexi in place; strict I/Os  - Increased output may be contributing to hypotensive episode    Palliative Care  Palliative care encounter  - Palliative care following, continued GOC with family.     ACP (advance care planning)  --Palliative care consulted, appreciate their assistance in clarification of GOC/POC with patient and family.    Goals of care, counseling/discussion  Code status changed to Full Code overnight on 8/1 by primary team.    - Spoke with daughter at bedside who stated  that she would want her mother to be stepped back up to the ICU if vasopressors were required for hypotension  - Recommend continued GOC conversations with patient and family regarding code status; as patient requested to be DNR previously- see palliative note from 7/18      Other  Debility  --PT/OT.       Critical Care Time: 60 minutes  Critical secondary to Patient has a condition that poses threat to life and bodily function: Acute Renal Failure and Shock.     Critical care was time spent personally by me on the following activities: development of treatment plan with patient or surrogate and bedside caregivers, discussions with consultants, evaluation of patient's response to treatment, examination of patient, ordering and performing treatments and interventions, ordering and review of laboratory studies, ordering and review of radiographic studies, pulse oximetry, re-evaluation of patient's condition. This critical care time did not overlap with that of any other provider or involve time for any procedures.    Thank you for your consult. I will follow-up with patient. Please contact us if you have any additional questions.     JEWELL Pillai-BC  Critical Care Medicine  [facility] - Cardiac Medical ICU

## 2024-08-02 NOTE — PT/OT/SLP DISCHARGE
Occupational Therapy Discharge Summary    Juhi Taylor  MRN: [id number]   Principal Problem: Clostridium difficile infection      Patient Discharged from acute Occupational Therapy on 8/2/24.      Assessment:      Pt transferred to ICU 2* for possible pressor support due hTN despite fluid resuscitation     Objective:     GOALS:   Multidisciplinary Problems       Occupational Therapy Goals          Problem: Occupational Therapy    Goal Priority Disciplines Outcome Interventions   Occupational Therapy Goal     OT, PT/OT Progressing    Description: Goals to be met by: 08/04/24     Patient will increase functional independence with ADLs by performing:    UE Dressing with Modified [place].  LE Dressing with Minimal Assistance.  Grooming while standing at sink with Stand-by Assistance.  Toileting from bedside commode with Minimal Assistance for hygiene and clothing management.   Toilet transfer to bedside commode with Supervision.    DME:  Tub transfer bench is required for pt to return to t/s use with shower chair at present unsuitable with need for mobiltiy greater than pt can safely complete at present.     Patient has a mobility limitation that significantly impairs their ability to participate in one or more mobility related activities of daily living, including toileting. This deficit can be resolved by using a bedside commode. Patient demonstrates mobility limitations that will cause them to be confined to one room at home without bathroom access for up to 30 days. Using a bedside commode will greatly improve the patient's ability to participate in MRADLs.     Ambulation needs TBD on progress.                              Reasons for Discontinuation of Therapy Services  Transfer to alternate level of care.  New orders needed to resume tx    Plan:     Patient Discharged to:  ICU    8/2/2024

## 2024-08-02 NOTE — ASSESSMENT & PLAN NOTE
- PD catheter removed 07/15  - Tunneled dialysis catheter was not functioning and therefore removed on 8/1 by nephrology  - Trialysis catheter placed in left fem on 7/20  - Tolerating iHD  - Start albumin 25 g q8h x 3 days per Nephrology, ending 7/25   - Attempted iHD on 8/1 but resistance with left fem trialysis line; only able to complete ~20 minutes. Will require new trialysis line to be placed by anesthesiology per nephrology recs  - 8/2: will attempt SLED per nephrology

## 2024-08-02 NOTE — ASSESSMENT & PLAN NOTE
Code status changed to Full Code overnight on 8/1 by primary team.    - Spoke with daughter at bedside who stated that she would want her mother to be stepped back up to the ICU if vasopressors were required for hypotension  - Recommend continued GOC conversations with patient and family regarding code status; as patient requested to be DNR previously- see palliative note from 7/18

## 2024-08-02 NOTE — SUBJECTIVE & OBJECTIVE
Interval History/Significant Events: Stepped up to MICU for hypotension and somnolence. Only received 20 mins of dialysis yesterday due to issues with line. LIJ placed. Now on vancomycin and zosyn. On Levophed 0.1    Review of Systems   Unable to perform ROS: Acuity of condition     Objective:     Vital Signs (Most Recent):  Temp: 97.1 °F (36.2 °C) (08/02/24 1200)  Pulse: 93 (08/02/24 1330)  Resp: 14 (08/02/24 1330)  BP: 116/61 (08/02/24 1330)  SpO2: 100 % (08/02/24 1330) Vital Signs (24h Range):  Temp:  [97.1 °F (36.2 °C)-98.7 °F (37.1 °C)] 97.1 °F (36.2 °C)  Pulse:  [] 93  Resp:  [13-32] 14  SpO2:  [89 %-100 %] 100 %  BP: ()/(40-69) 116/61   Weight: 128.5 kg (283 lb 4.7 oz)  Body mass index is 44.37 kg/m².      Intake/Output Summary (Last 24 hours) at 8/2/2024 1354  Last data filed at 8/2/2024 0600  Gross per 24 hour   Intake 2927.99 ml   Output 280 ml   Net 2647.99 ml          Physical Exam  Vitals and nursing note reviewed.   Constitutional:       Appearance: She is ill-appearing.   HENT:      Head: Normocephalic.   Pulmonary:      Effort: Pulmonary effort is normal. No respiratory distress.      Breath sounds: Normal breath sounds.   Neurological:      Mental Status: She is disoriented.      Comments: Likely uremic encephalopathy            Vents:  Vent Mode: Spont (07/16/24 1003)  Set Rate: 15 BPM (07/16/24 0903)  Vt Set: 420 mL (07/16/24 0903)  Pressure Support: 5 cmH20 (07/16/24 1003)  PEEP/CPAP: 5 cmH20 (07/16/24 1003)  Oxygen Concentration (%): 32 (07/25/24 1755)  Peak Airway Pressure: 10 cmH20 (07/16/24 1003)  Plateau Pressure: 0 cmH20 (07/16/24 1003)  Total Ve: 5.45 L/m (07/16/24 1003)  Negative Inspiratory Force (cm H2O): 0 (07/16/24 1003)  Lines/Drains/Airways       Central Venous Catheter Line  Duration             Trialysis (Dialysis) Catheter 07/20/24 1120 left femoral 13 days    Trialysis (Dialysis) Catheter 08/02/24 0452 left internal jugular <1 day              Drain  Duration                   Rectal Tube 07/28/24 0000 5 days                  Significant Labs:    CBC/Anemia Profile:  Recent Labs   Lab 08/01/24  0647 08/02/24  0321   WBC 25.44* 28.41*   HGB 7.8* 7.3*   HCT 26.6* 24.3*    207   MCV 91 89   RDW 23.2* 23.0*        Chemistries:  Recent Labs   Lab 08/01/24  0647 08/02/24  0321   * 135*   K 3.9 3.8    108   CO2 15* 12*   BUN 55* 60*   CREATININE 6.4* 6.5*   CALCIUM 9.0 8.7   ALBUMIN 1.8* 1.5*   MG 2.1 2.2   PHOS 5.2*  5.2* 5.7*  5.7*       All pertinent labs within the past 24 hours have been reviewed.    Significant Imaging:  I have reviewed all pertinent imaging results/findings within the past 24 hours.

## 2024-08-02 NOTE — ASSESSMENT & PLAN NOTE
- AFib with RVR appears new following cardiac arrest at OSH  - Patient endorses amiodarone reaction with itching, rash, and oral burning. Refused Sotalol, DCCV, and AICD placement at OSH.   - Was placed on Diltizem infusion and transitioned to PO metoprolol. Amio gtt restarted overnight 7/11 s/t uncontrolled afib with RVR. Transitioned to PO amio  - Patient's rate remained elevated despite amiodarone. Appeared to respond better to metoprolol.   - Continue Toprol 50 mg daily  - Apixaban on hold. Anticoagulation with a heparin infusion

## 2024-08-02 NOTE — CONSULTS
[facility] - Cardiac Medical [facility]  Wound Care    Patient Name:  Juhi Taylor   MRN:  [id number]  Date: 2024  Diagnosis: Clostridium difficile infection    History:     Past Medical History:   Diagnosis Date    Anticoagulant long-term use     Arthritis     Breast cancer 2014    invasive lobular carcinoma    Cancer of kidney 2020    RIGHT KIDNEY CANCER    CHF (congestive heart failure)     Coronary artery disease dx     Depression     Diabetes mellitus     Diastolic heart failure secondary to hypertension     Gout     Hyperlipemia     Hypertension     Hypertrophy of nasal turbinates     Kidney mass     Right    Levoscoliosis     Lung nodule     left    Multiple thyroid nodules     NICOLE (obstructive sleep apnea)     uses C-PAP    Pulmonary hypertension     Severe sepsis 2024       Social History     Socioeconomic History    Marital status: Single    Number of children: 4   Occupational History    Occupation: retired Psych aid    Tobacco Use    Smoking status: Former     Current packs/day: 0.00     Types: Cigarettes     Start date: 2016     Quit date: 2016     Years since quittin.6    Smokeless tobacco: Never    Tobacco comments:     quit    Substance and Sexual Activity    Alcohol use: No    Drug use: No    Sexual activity: Not Currently     Partners: Male     Birth control/protection: None     Social Determinants of Health     Financial Resource Strain: Low Risk  (2024)    Overall Financial Resource Strain (CARDIA)     Difficulty of Paying Living Expenses: Not very hard   Recent Concern: Financial Resource Strain - Medium Risk (2024)    Overall Financial Resource Strain (CARDIA)     Difficulty of Paying Living Expenses: Somewhat hard   Food Insecurity: No Food Insecurity (2024)    Hunger Vital Sign     Worried About Running Out of Food in the Last Year: Never true     Ran Out of Food in the Last Year: Never true   Recent Concern: Food Insecurity - Food  Insecurity Present (6/20/2024)    Hunger Vital Sign     Worried About Running Out of Food in the Last Year: Sometimes true     Ran Out of Food in the Last Year: Never true   Transportation Needs: No Transportation Needs (7/5/2024)    TRANSPORTATION NEEDS     Transportation : No   Physical Activity: Inactive (7/5/2024)    Exercise Vital Sign     Days of Exercise per Week: 0 days     Minutes of Exercise per Session: 0 min   Stress: Patient Unable To Answer (7/5/2024)    [employer] - Occupational Stress Questionnaire     Feeling of Stress : Patient unable to answer   Housing Stability: Low Risk  (7/5/2024)    Housing Stability Vital Sign     Unable to Pay for Housing in the Last Year: No     Homeless in the Last Year: No       Precautions:     Allergies as of 07/02/2024 - Reviewed 06/23/2024   Allergen Reaction Noted    Percocet [oxycodone-acetaminophen] Itching 03/15/2022    Amiodarone analogues  01/16/2023    Irbesartan Swelling 12/11/2019    Januvia [sitagliptin]  06/28/2021    Jardiance [empagliflozin]  09/14/2020    Lipitor [atorvastatin] Other (See Comments) 12/08/2020    Linaclotide Other (See Comments) and Nausea And Vomiting 08/30/2017    Lubiprostone Other (See Comments) and Palpitations 08/30/2017       [facility] Assessment Details/Treatment     Consult received. Patient seen on prior floor 7/31 before being stepped up to MICU. Wound care orders in place. Will return for follow assessment.       08/02/24 0600   WO Assessment   WOCN Total Time (mins) 15   Visit Date 08/02/24   Visit Time 0600   Consult Type New;Follow Up   WO Speciality Wound   Intervention chart review;coordination of care   Teaching on-going       Orders placed.   Ambrosio LOPEZN, RN  08/02/2024

## 2024-08-02 NOTE — ASSESSMENT & PLAN NOTE
-- Latest A1C 6.8; takes metformin at home.   -- Target -180. Within goal.   -- Continue to monitor.   -- SSI ordered

## 2024-08-02 NOTE — ASSESSMENT & PLAN NOTE
Likely multifactorial in the setting of septic shock and uremia  - Continue to monitor     High Dose Vitamin A Counseling: Side effects reviewed, pt to contact office should one occur.

## 2024-08-02 NOTE — CARE UPDATE
RAPID RESPONSE NURSE FOLLOW-UP NOTE       Followed up with patient for proactive rounding.  Patient remains hypotensive despite fluid resuscitation  Critical care notified, pt to be accepted to [facility] service for possible vasopressor support, higher level of care  Transfer orders placed, patient transferred to MICU [id number] with RRN x2, bedside RN  Pt daughter Nell at bedside aware of and in agreement with plan  Reviewed plan of care with MICU bedside RNNegar .   Team will sign off care to [facility] at this time  Please call Rapid Response RNAMINATA, NEENA with any questions or concerns at [phone].

## 2024-08-02 NOTE — ASSESSMENT & PLAN NOTE
ESRD - was briefly on PD then HD, having issues w tunnel cath (removed on 7/31) and now w trialysis in place in [facility]. S/p R nephrectomy RCC  C diff positive - on oral vanc  HTN  DM  Afib w rvr  CHF w ischemic cardiomyopathy  S/p cardiac arrest     Plan/Recommendations    -Plan for 12 hour SLED. UF goal 500ml. Decrease UF if increase in pressor support   -Continue to monitor intake and output     Anemia in ESRD;  .Hemoglobin goal in ESRD is 10-12g/dl  Epo with HD   .  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  Lab Results   Component Value Date    .7 (H) 05/08/2024    CALCIUM 8.7 08/02/2024    CAION 1.02 (L) 07/20/2024    PHOS 5.7 (H) 08/02/2024    PHOS 5.7 (H) 08/02/2024

## 2024-08-02 NOTE — ASSESSMENT & PLAN NOTE
Code status changed to Full Code overnight on 8/1 by primary team.    - Spoke with daughter at bedside who stated that she would want her mother to be stepped back up to the ICU if vasopressors were required for hypotension

## 2024-08-03 NOTE — ASSESSMENT & PLAN NOTE
Aerobic culture positive for pseudomonas. Likely infected left femoral line    - Tentatively plan to remove left femoral line  - Vancomycin and Zosyn ordered  - Levophed and vasopressin MAP > 65  - Repeat blood cultures NGTD

## 2024-08-03 NOTE — SUBJECTIVE & OBJECTIVE
Interval History/Significant Events: NAEON. Patient developed new right leg pain this morning. Leg is swollen without erythema. The daughter, Adilene, was bedside. We discussed goals of care with patient and daughter who both agreed that she wouldn't want to be intubated or have CPR done on her. The 2nd daughter isn't open to making the patient DNR. The brother is leaning on the side of DNR. Ongoing discussion with the whole family need to be done to come to an agreement.     Review of Systems   Unable to perform ROS: Acuity of condition     Objective:     Vital Signs (Most Recent):  Temp: 98.4 °F (36.9 °C) (08/03/24 1505)  Pulse: (!) 124 (08/03/24 1600)  Resp: 13 (08/03/24 1600)  BP: 131/74 (08/03/24 1600)  SpO2: 97 % (08/03/24 1600) Vital Signs (24h Range):  Temp:  [96.2 °F (35.7 °C)-98.4 °F (36.9 °C)] 98.4 °F (36.9 °C)  Pulse:  [] 124  Resp:  [13-34] 13  SpO2:  [86 %-100 %] 97 %  BP: ()/() 131/74   Weight: 128.5 kg (283 lb 4.7 oz)  Body mass index is 44.37 kg/m².      Intake/Output Summary (Last 24 hours) at 8/3/2024 1616  Last data filed at 8/3/2024 1500  Gross per 24 hour   Intake 4530.16 ml   Output 3781 ml   Net 749.16 ml          Physical Exam  Vitals and nursing note reviewed.   Constitutional:       General: She is sleeping.      Appearance: She is morbidly obese. She is ill-appearing.   HENT:      Mouth/Throat:      Mouth: Mucous membranes are dry.      Pharynx: Oropharynx is clear.   Eyes:      Extraocular Movements: Extraocular movements intact.      Pupils: Pupils are equal, round, and reactive to light.   Cardiovascular:      Rate and Rhythm: Regular rhythm. Tachycardia present.   Pulmonary:      Effort: Pulmonary effort is normal.   Abdominal:      Palpations: Abdomen is soft.   Genitourinary:     Comments: Damon and Flexi  Musculoskeletal:         General: Swelling present.      Right lower leg: 3+ Edema present.      Left lower leg: 3+ Edema present.   Skin:     General: Skin is  warm and dry.      Capillary Refill: Capillary refill takes less than 2 seconds.   Neurological:      General: No focal deficit present.      Mental Status: She is easily aroused. She is lethargic and disoriented.   Psychiatric:         Behavior: Behavior is cooperative.            Vents:  Vent Mode: Spont (07/16/24 1003)  Set Rate: 15 BPM (07/16/24 0903)  Vt Set: 420 mL (07/16/24 0903)  Pressure Support: 5 cmH20 (07/16/24 1003)  PEEP/CPAP: 5 cmH20 (07/16/24 1003)  Oxygen Concentration (%): 30 (08/02/24 2359)  Peak Airway Pressure: 10 cmH20 (07/16/24 1003)  Plateau Pressure: 0 cmH20 (07/16/24 1003)  Total Ve: 5.45 L/m (07/16/24 1003)  Negative Inspiratory Force (cm H2O): 0 (07/16/24 1003)  Lines/Drains/Airways       Central Venous Catheter Line  Duration             Trialysis (Dialysis) Catheter 08/02/24 0452 left internal jugular 1 day              Drain  Duration                  Rectal Tube 07/28/24 0000 6 days                  Significant Labs:    CBC/Anemia Profile:  Recent Labs   Lab 08/02/24  0321 08/03/24  0327   WBC 28.41* 23.47*   HGB 7.3* 7.5*   HCT 24.3* 24.8*    156   MCV 89 84   RDW 23.0* 22.6*        Chemistries:  Recent Labs   Lab 08/02/24  2335 08/03/24  0327 08/03/24  1357    137 138   K 3.9 3.8 4.1    106 104   CO2 17* 19* 17*   BUN 42* 28* 18   CREATININE 4.7* 3.4* 2.6*   CALCIUM 8.5* 7.9* 8.3*   ALBUMIN 1.7* 1.6* 1.6*   MG 2.0 1.9 2.1   PHOS 4.2 3.1  3.1 3.3       All pertinent labs within the past 24 hours have been reviewed.    Significant Imaging:  I have reviewed all pertinent imaging results/findings within the past 24 hours.

## 2024-08-03 NOTE — ASSESSMENT & PLAN NOTE
ESRD - was briefly on PD then HD, having issues w tunnel cath (removed on 7/31) and now w trialysis in place in [facility]. S/p R nephrectomy RCC  C diff positive - on oral vanc  HTN  DM  Afib w rvr  CHF w ischemic cardiomyopathy  S/p cardiac arrest     Plan/Recommendations    -Plan for 12 hour SLED tonight, aim to maintain net even fluid balance.   -Continue to monitor intake and output     Anemia in ESRD;  .Hemoglobin goal in ESRD is 10-12g/dl  Epo with HD   .  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  Lab Results   Component Value Date    .7 (H) 05/08/2024    CALCIUM 8.3 (L) 08/03/2024    CAION 1.02 (L) 07/20/2024    PHOS 3.3 08/03/2024

## 2024-08-03 NOTE — PROGRESS NOTES
08/03/24 1820   Treatment   Treatment Type SLED   Treatment Status Restart   Dialysis Machine Number K32   Dialyzer Time (hours) 0   BVP (Liters) 0 L   Solutions Labeled and Current  Yes   Access Temporary Cath;Left;IJ   Catheter Dressing Intact  Yes   Alarms Engaged Yes   CRRT Comments sled restarted   Prescription   Time (Hours) 6   Dialysate K + (mEq/L) 4   Dialysate CA + (mEq/L) 2.25   Dialysate HCO3 - (Bicarb) (mEq/L) 30   Dialysate Na + (mEq/L) 140   Cartridge Type Other  (r300)   Dialysate Flow Rate (mL/min) 200   UF Goal Rate 250 mL/hr   CRRT Hourly Documentation   Blood Flow (mL/min) 150   UF Rate 200 cc/hr   Arterial Pressure (mmHg) -90 mmHg   Venous Pressure (mmHg) 60 mmHg   Effluent Pressure (EP) (mmHg) 20 mmHg   Total UF (Hourly Cleared) (mL) 0     SLED restarted as ordered. LIJ CVC aspirated, flushed, and accessed using aseptic technique. Lines connected and secured.

## 2024-08-03 NOTE — ASSESSMENT & PLAN NOTE
Code status changed to Full Code overnight on 8/1 by primary team. Spoke with daughter at bedside who stated that she would want her mother to be stepped back up to the ICU if vasopressors were required for hypotension    - Ongoing goals of discussion with family needed to come to an agreement

## 2024-08-03 NOTE — PROGRESS NOTES
[facility] - Card[facility] Medical [facility]  Nephrology  Progress Note    Patient Name: Juhi Taylor  MRN: [id number]  Admission Date: 7/3/2024  Hospital Length of Stay: 31 days  Attending Provider: Wyatt Valencia MD   Primary Care Physician: Tony Sadler MD  Principal Problem:Clostridium difficile infection    Subjective:     HPI: 74-year-old female with prior history of CKD stage 5 with recent PD catheter placement on 6/4, RCC s/p right nephrectomy, T2DM, obesity, CAD s/p PCI to Lcx and LAD in 11/2020 and HFmREF who is admitted as a transfer from [facility] for higher level of care and further cardiac evaluation. Nephrology consulted for hemodialysis.     She initially presented on 6/18/24 to [facility] for hypervolemia in the setting of CKD 4-5 for which she had undergone elective PD catheter placement complicated by catheter site leaking. She had a complex hospital course, was initially placed on furosemide gtt without significant improvement and later underwent placement of tunneled line for HD with volume removal. During her second dialysis session she went into PEA arrest for which she was resuscitated, intubated and transferred to ICU. She was noted to have elevated troponin and subsequent TTE found reduced EF from 40-45% to 20-25% for which coronary angiography was recommended. Her hospital course was also complicated by dialysis line infection, significant bleeding around the catheter site, bradycardia and subsequent atrial fibrillation with RVR. Given the catheter site bleeding, anticoagulation was temporarily held. She has reported allergy to amiodarone, was initially placed on diltiazem for atrial fibrillation despite reduced EF. She discussed GOC at outside hospital where it appears that she lacked capacity per note and decided to pursue higher level of care at [facility]. At that time, gen surgery was planning to place a left tunneled catheter due to concerns of the right side not being suitable    Upon admit,  patient appeared drowsy but conversant. Daughter reports that she typically gets lethargic 2/2 to the volume overload due to not receiving dialysis. Patient reported b/l left lower extremity pain. Daughter concerned about her not having dialysis in 4 days and wants a session today.     Interval History: Patient seen this morning on SLED, tolerating well. Complaining of left leg pain.     Review of patient's allergies indicates:   Allergen Reactions    Percocet [oxycodone-acetaminophen] Itching    Januvia [sitagliptin]     Jardiance [empagliflozin]      Leg cramps    Lipitor [atorvastatin] Other (See Comments)     Severe leg pain    Linaclotide Other (See Comments) and Nausea And Vomiting     Does not remember    Lubiprostone Other (See Comments) and Palpitations     Does not remember     Current Facility-Administered Medications   Medication Frequency    0.9%  NaCl infusion (CRRT USE ONLY) Continuous    0.9%  NaCl infusion (CRRT USE ONLY) Continuous    acetaminophen tablet 1,000 mg Q8H PRN    alteplase injection 4 mg Once    cholestyramine 4 gram packet 4 g BID    dextromethorphan-guaiFENesin  mg/5 ml liquid 5 mL Q4H PRN    dextrose 10% bolus 125 mL 125 mL PRN    dextrose 10% bolus 250 mL 250 mL PRN    diphenhydrAMINE capsule 25 mg Q6H PRN    duke's soln (benadryl 30 mL, mylanta 30 mL, LIDOcaine 30 mL, nystatin 30 mL) 120 mL QID    epoetin alaina injection 6,430 Units Every Mon, Wed, Fri    fidaxomicin tablet 200 mg BID    glucagon (human recombinant) injection 1 mg PRN    glucose chewable tablet 16 g PRN    glucose chewable tablet 24 g PRN    heparin (porcine) injection 1,000 Units PRN    heparin 25,000 units in dextrose 5% (100 units/ml) IV bolus from bag LOW INTENSITY nomogram - [facility] PRN    heparin 25,000 units in dextrose 5% (100 units/ml) IV bolus from bag LOW INTENSITY nomogram - [facility] PRN    heparin 25,000 units in dextrose 5% 250 mL (100 units/mL) infusion LOW INTENSITY nomogram - [facility] Continuous     HYDROcodone-acetaminophen 5-325 mg per tablet 1 tablet Q6H PRN    HYDROmorphone injection 0.5 mg Q2H PRN    hyoscyamine SL tablet 0.125 mg Q4H PRN    insulin aspart U-100 pen 0-10 Units QID (AC + HS) PRN    LIDOcaine 5 % patch 1 patch Q24H    LIDOcaine 5 % patch 2 patch Q24H    magnesium sulfate 2g in water 50mL IVPB (premix) PRN    metoprolol injection 5 mg Q6H PRN    miconazole NITRATE 2 % top powder BID    midodrine tablet 10 mg Daily PRN    naloxone 0.4 mg/mL injection 0.02 mg PRN    NORepinephrine 32 mg in D5W 250 mL infusion Continuous    ondansetron injection 4 mg Q6H PRN    piperacillin-tazobactam (ZOSYN) 4.5 g in D5W 100 mL IVPB (MB+) Q8H    simethicone chewable tablet 80 mg TID PRN    sodium chloride 0.9% bolus 250 mL 250 mL PRN    sodium chloride 0.9% flush 10 mL PRN    sodium chloride 0.9% flush 10 mL PRN    sodium phosphate 20.01 mmol in D5W 250 mL IVPB PRN    sodium phosphate 30 mmol in D5W 250 mL IVPB PRN    sodium phosphate 39.99 mmol in D5W 250 mL IVPB PRN    vancomycin (VANCOCIN) 500 mg in D5W 100 mL IVPB (MB+) Once    vancomycin - pharmacy to dose pharmacy to manage frequency    vasopressin (PITRESSIN) 0.2 Units/mL in D5W 100 mL infusion Continuous       Objective:     Vital Signs (Most Recent):  Temp: 98.4 °F (36.9 °C) (08/03/24 1505)  Pulse: (!) 124 (08/03/24 1600)  Resp: 13 (08/03/24 1600)  BP: 131/74 (08/03/24 1600)  SpO2: 97 % (08/03/24 1600) Vital Signs (24h Range):  Temp:  [96.2 °F (35.7 °C)-98.4 °F (36.9 °C)] 98.4 °F (36.9 °C)  Pulse:  [] 124  Resp:  [13-34] 13  SpO2:  [86 %-100 %] 97 %  BP: ()/() 131/74     Weight: 128.5 kg (283 lb 4.7 oz) (08/01/24 1129)  Body mass index is 44.37 kg/m².  Body surface area is 2.46 meters squared.    I/O last 3 completed shifts:  In: 4245.6 [I.V.:2834.5; IV Piggyback:1411.1]  Out: 2688 [Other:2388; Stool:300]     Physical Exam  Constitutional:       Comments: Lethargic   HENT:      Right Ear: External ear normal.      Left Ear: External  ear normal.      Mouth/Throat:      Mouth: Mucous membranes are moist.   Eyes:      Extraocular Movements: Extraocular movements intact.   Cardiovascular:      Rate and Rhythm: Normal rate.      Pulses: Normal pulses.   Pulmonary:      Effort: Pulmonary effort is normal.      Breath sounds: No wheezing, rhonchi or rales.   Abdominal:      General: Abdomen is flat.   Musculoskeletal:      Cervical back: Normal range of motion.      Right lower leg: Edema present.      Left lower leg: Edema present.   Skin:     General: Skin is warm.      Capillary Refill: Capillary refill takes less than 2 seconds.          Significant Labs:  CBC:   Recent Labs   Lab 08/03/24  0327   WBC 23.47*   RBC 2.94*   HGB 7.5*   HCT 24.8*      MCV 84   MCH 25.5*   MCHC 30.2*     CMP:   Recent Labs   Lab 08/03/24  1357   *   CALCIUM 8.3*   ALBUMIN 1.6*      K 4.1   CO2 17*      BUN 18   CREATININE 2.6*     All labs within the past 24 hours have been reviewed.     Significant Imaging:    Assessment/Plan:     Renal/  ESRD (end stage renal disease)  ESRD - was briefly on PD then HD, having issues w tunnel cath (removed on 7/31) and now w trialysis in place in [facility]. S/p R nephrectomy RCC  C diff positive - on oral vanc  HTN  DM  Afib w rvr  CHF w ischemic cardiomyopathy  S/p cardiac arrest     Plan/Recommendations    -Plan for 12 hour SLED tonight, aim to maintain net even fluid balance.   -Continue to monitor intake and output     Anemia in ESRD;  .Hemoglobin goal in ESRD is 10-12g/dl  Epo with HD   .  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  Lab Results   Component Value Date    .7 (H) 05/08/2024    CALCIUM 8.3 (L) 08/03/2024    CAION 1.02 (L) 07/20/2024    PHOS 3.3 08/03/2024        Endocrine  Severe obesity (BMI >= 40)  Per primary team          Thank you for your consult. I will follow-up with patient. Please contact us if you have any additional questions.    Jon Hernandez MD  Nephrology  [facility] - Cardiac Medical ICU

## 2024-08-03 NOTE — SUBJECTIVE & OBJECTIVE
Interval History: Patient seen this morning on SLED, tolerating well. Complaining of left leg pain.     Review of patient's allergies indicates:   Allergen Reactions    Percocet [oxycodone-acetaminophen] Itching    Januvia [sitagliptin]     Jardiance [empagliflozin]      Leg cramps    Lipitor [atorvastatin] Other (See Comments)     Severe leg pain    Linaclotide Other (See Comments) and Nausea And Vomiting     Does not remember    Lubiprostone Other (See Comments) and Palpitations     Does not remember     Current Facility-Administered Medications   Medication Frequency    0.9%  NaCl infusion (CRRT USE ONLY) Continuous    0.9%  NaCl infusion (CRRT USE ONLY) Continuous    acetaminophen tablet 1,000 mg Q8H PRN    alteplase injection 4 mg Once    cholestyramine 4 gram packet 4 g BID    dextromethorphan-guaiFENesin  mg/5 ml liquid 5 mL Q4H PRN    dextrose 10% bolus 125 mL 125 mL PRN    dextrose 10% bolus 250 mL 250 mL PRN    diphenhydrAMINE capsule 25 mg Q6H PRN    duke's soln (benadryl 30 mL, mylanta 30 mL, LIDOcaine 30 mL, nystatin 30 mL) 120 mL QID    epoetin alaina injection 6,430 Units Every Mon, Wed, Fri    fidaxomicin tablet 200 mg BID    glucagon (human recombinant) injection 1 mg PRN    glucose chewable tablet 16 g PRN    glucose chewable tablet 24 g PRN    heparin (porcine) injection 1,000 Units PRN    heparin 25,000 units in dextrose 5% (100 units/ml) IV bolus from bag LOW INTENSITY nomogram - [facility] PRN    heparin 25,000 units in dextrose 5% (100 units/ml) IV bolus from bag LOW INTENSITY nomogram - [facility] PRN    heparin 25,000 units in dextrose 5% 250 mL (100 units/mL) infusion LOW INTENSITY nomogram - [facility] Continuous    HYDROcodone-acetaminophen 5-325 mg per tablet 1 tablet Q6H PRN    HYDROmorphone injection 0.5 mg Q2H PRN    hyoscyamine SL tablet 0.125 mg Q4H PRN    insulin aspart U-100 pen 0-10 Units QID (AC + HS) PRN    LIDOcaine 5 % patch 1 patch Q24H    LIDOcaine 5 % patch 2 patch Q24H    magnesium  sulfate 2g in water 50mL IVPB (premix) PRN    metoprolol injection 5 mg Q6H PRN    miconazole NITRATE 2 % top powder BID    midodrine tablet 10 mg Daily PRN    naloxone 0.4 mg/mL injection 0.02 mg PRN    NORepinephrine 32 mg in D5W 250 mL infusion Continuous    ondansetron injection 4 mg Q6H PRN    piperacillin-tazobactam (ZOSYN) 4.5 g in D5W 100 mL IVPB (MB+) Q8H    simethicone chewable tablet 80 mg TID PRN    sodium chloride 0.9% bolus 250 mL 250 mL PRN    sodium chloride 0.9% flush 10 mL PRN    sodium chloride 0.9% flush 10 mL PRN    sodium phosphate 20.01 mmol in D5W 250 mL IVPB PRN    sodium phosphate 30 mmol in D5W 250 mL IVPB PRN    sodium phosphate 39.99 mmol in D5W 250 mL IVPB PRN    vancomycin (VANCOCIN) 500 mg in D5W 100 mL IVPB (MB+) Once    vancomycin - pharmacy to dose pharmacy to manage frequency    vasopressin (PITRESSIN) 0.2 Units/mL in D5W 100 mL infusion Continuous       Objective:     Vital Signs (Most Recent):  Temp: 98.4 °F (36.9 °C) (08/03/24 1505)  Pulse: (!) 124 (08/03/24 1600)  Resp: 13 (08/03/24 1600)  BP: 131/74 (08/03/24 1600)  SpO2: 97 % (08/03/24 1600) Vital Signs (24h Range):  Temp:  [96.2 °F (35.7 °C)-98.4 °F (36.9 °C)] 98.4 °F (36.9 °C)  Pulse:  [] 124  Resp:  [13-34] 13  SpO2:  [86 %-100 %] 97 %  BP: ()/() 131/74     Weight: 128.5 kg (283 lb 4.7 oz) (08/01/24 1129)  Body mass index is 44.37 kg/m².  Body surface area is 2.46 meters squared.    I/O last 3 completed shifts:  In: 4245.6 [I.V.:2834.5; IV Piggyback:1411.1]  Out: 2688 [Other:2388; Stool:300]     Physical Exam  Constitutional:       Comments: Lethargic   HENT:      Right Ear: External ear normal.      Left Ear: External ear normal.      Mouth/Throat:      Mouth: Mucous membranes are moist.   Eyes:      Extraocular Movements: Extraocular movements intact.   Cardiovascular:      Rate and Rhythm: Normal rate.      Pulses: Normal pulses.   Pulmonary:      Effort: Pulmonary effort is normal.      Breath  sounds: No wheezing, rhonchi or rales.   Abdominal:      General: Abdomen is flat.   Musculoskeletal:      Cervical back: Normal range of motion.      Right lower leg: Edema present.      Left lower leg: Edema present.   Skin:     General: Skin is warm.      Capillary Refill: Capillary refill takes less than 2 seconds.          Significant Labs:  CBC:   Recent Labs   Lab 08/03/24  0327   WBC 23.47*   RBC 2.94*   HGB 7.5*   HCT 24.8*      MCV 84   MCH 25.5*   MCHC 30.2*     CMP:   Recent Labs   Lab 08/03/24  1357   *   CALCIUM 8.3*   ALBUMIN 1.6*      K 4.1   CO2 17*      BUN 18   CREATININE 2.6*     All labs within the past 24 hours have been reviewed.     Significant Imaging:

## 2024-08-03 NOTE — PLAN OF CARE
[facility] Card[facility] Medical [facility]  Discharge Reassessment    Primary Care Provider: Tony Sadler MD    Expected Discharge Date: 8/7/2024    Patient stepped up to MICU 8/2/2024 at 0227 hours from [id number] for hypotension and increased somnolence. Patient not medically ready to discharge per MD.     sodium chloride 0.9%   Intravenous Continuous 200 mL/hr at 08/02/24 2100 New Bag at 08/02/24 2100    heparin (porcine) in D5W  0-40 Units/kg/hr (Adjusted) Intravenous Continuous 10.6 mL/hr at 08/02/24 1713 12 Units/kg/hr at 08/02/24 1713    NORepinephrine bitartrate-D5W  0-3 mcg/kg/min Intravenous Continuous 6 mL/hr at 08/02/24 1133 0.1 mcg/kg/min at 08/02/24 1133     Discharge Plan A and Plan B have been determined by review of patient's clinical status, future medical and therapeutic needs, and coverage/benefits for post-acute care in coordination with multidisciplinary team members.      Reassessment (most recent)       Discharge Reassessment - 08/02/24 2057          Discharge Reassessment    Assessment Type Discharge Planning Reassessment     Did the patient's condition or plan change since previous assessment? Yes     Discharge Plan discussed with: Adult children     Communicated HARINI with patient/caregiver Date not available/Unable to determine     Discharge Plan A Skilled Nursing Facility     Discharge Plan B Rehab     Why the patient remains in the hospital Requires continued medical care        Post-Acute Status    Post-Acute Authorization Placement     Post-Acute Placement Status Referrals Sent     Diaylsis Status Set-up Complete/Auth obtained     Coverage MEDICARE - MEDICARE PART A & B     Hospital Resources/Appts/Education Provided Appointments scheduled and added to AVS     Discharge Delays None known at this time                   Sonia Preston RN     [phone]

## 2024-08-03 NOTE — PLAN OF CARE
MICU DAILY GOALS     Family/Goals of care/Code Status   Code Status: Full Code    24H Vital Sign Range  Temp:  [96.2 °F (35.7 °C)-98.4 °F (36.9 °C)]   Pulse:  []   Resp:  [13-34]   BP: ()/()   SpO2:  [86 %-100 %]      Shift Events (include procedures and significant events)   Pt pressor requirements increased during shift; Vaso added allowing levo to be titrated down. Repeating 6hr SLED tonight.    AWAKE RASS: Goal - RASS Goal: 0-->alert and calm  Actual - RASS (Jha Agitation-Sedation Scale): restless    Restraint necessity: Not necessary   BREATHE SBT: Not intubated    Coordinate A & B, analgesics/sedatives Pain: managed   SAT: Not intubated   Delirium CAM-ICU: Overall CAM-ICU: Positive   Early(intubated/ Progressive (non-intubated) Mobility MOVE Screen (INTUBATED ONLY): Not intubated    Activity: Activity Management: Rolling - L1   Feeding/Nutrition Diet order: Diet/Nutrition Received: NPO, Specialty Diet/Nutrition Received: renal diet   Thrombus DVT prophylaxis: VTE Required Core Measure: Pharmacological prophylaxis initiated/maintained   HOB Elevation Head of Bed (HOB) Positioning: HOB at 30-45 degrees   Ulcer Prophylaxis GI: no   Glucose control managed Glycemic Management: blood glucose monitored   Skin Skin assessed during: Daily Assessment    Sacrum intact/not altered? No  Heels intact/not altered? Yes  Surgical wound? No    CHECK ONE!   (no altered skin or altered skin) and sub boxes:  [] No Altered Skin Integrity Present    []Prevention Measures Documented    [x] Altered Skin Integrity Present or Discovered   [x] LDA present in EPIC, daily doc completed              [] LDA added if not in EPIC (describe wound).                    When describing wound, do not stage, use descriptive words only.    [] Wound Image Taken (required on admit,                   transfer/discharge and every Tuesday)    Wound Care Consulted? Yes    4 EYES:  Attending Nurse (1st set of eyes): Chris  RN    Second RN/Staff Member (2nd set of eyes): NEENA Saenz   Bowel Function diarrhea    Indwelling Catheter Necessity      [REMOVED] Trialysis (Dialysis) Catheter 07/08/24 2210 left internal jugular-Line Necessity Review: CRRT/HD  [REMOVED]      Hemodialysis Catheter 07/11/24 1219 left subclavian-Line Necessity Review: CRRT/HD  [REMOVED] Trialysis (Dialysis) Catheter 07/20/24 1120 left femoral-Line Necessity Review: CRRT/HD  Trialysis (Dialysis) Catheter 08/02/24 0452 left internal jugular-Line Necessity Review: CRRT/HD     De-escalation Antibiotics No        VS and assessment per flow sheet, patient progressing towards goals as tolerated, plan of care reviewed with family/patient, all concerns addressed, will continue to monitor.

## 2024-08-03 NOTE — ASSESSMENT & PLAN NOTE
Echo    Left Ventricle: The left ventricle is moderately dilated. Normal wall thickness. Global hypokinesis present. There is severely reduced systolic function with a visually estimated ejection fraction of 20 - 25%.    Right Ventricle: Mild right ventricular enlargement. Wall thickness is normal. Right ventricle wall motion has global hypokinesis. Systolic function is moderately reduced.    Left Atrium: Left atrium is severely dilated.    Right Atrium: Right atrium is severely dilated.    Aortic Valve: The aortic valve is a trileaflet valve. There is mild aortic valve sclerosis.    Mitral Valve: There is mild regurgitation.    Tricuspid Valve: There is severe regurgitation.    Pulmonary Artery: There is moderate pulmonary hypertension. The estimated pulmonary artery systolic pressure is 61 mmHg.    IVC/SVC: Elevated venous pressure at 15 mmHg.    Pericardium: There is a trivial circumferential effusion. No indication of cardiac tamponade.    - Will require interventional cardiology for LHC vs PET for reduced EF once euvolemic  - LifeVest prior to discharge  - Entresto recommended by cardiology, but currently on hold due to sepsis  - Strict I/Os

## 2024-08-03 NOTE — PROGRESS NOTES
08/03/24 1130   Treatment   Treatment Status Discontinued treatment;Blood returned     Blood returned by primary RN d/t clotting. Machine is off- unable to obtain dialyzer time, BVP, and UF. Machine disinfected at this time.

## 2024-08-03 NOTE — PROGRESS NOTES
Pharmacokinetic Assessment Follow Up: IV Vancomycin    Vancomycin Regimen Assessment/Plan:    - Vancomycin random level resulted within goal as 15 mcg/mL, goal 15-20 mcg/mL.   - Continue pulse dosing in the setting of ESRD.  Patient finished a 12-hour course of SLED today.  No new orders from Nephrology.   - Administer vancomycin 500 mg x 1 dose.  - A random level is ordered tomorrow with AM labs. Pharmacy to re-dose based on level and RRT plans.     Drug levels (last 3 results):  Recent Labs   Lab Result Units 08/03/24  0327   Vancomycin, Random ug/mL 15.0       Pharmacy will continue to follow and monitor vancomycin.    Please contact pharmacy at extension [phone] for questions regarding this assessment.    Thank you for the consult,   Laura Melendrez, PharmD, BCCCP       Patient brief summary:  Juhi Taylor is a 74 y.o. female initiated on antimicrobial therapy with IV Vancomycin for treatment of sepsis    The patient's current regimen is pulse dosing.    Drug Allergies:   Review of patient's allergies indicates:   Allergen Reactions    Percocet [oxycodone-acetaminophen] Itching    Januvia [sitagliptin]     Jardiance [empagliflozin]      Leg cramps    Lipitor [atorvastatin] Other (See Comments)     Severe leg pain    Linaclotide Other (See Comments) and Nausea And Vomiting     Does not remember    Lubiprostone Other (See Comments) and Palpitations     Does not remember       Actual Body Weight:   128.5    Renal Function:   Estimated Creatinine Clearance: 27.3 mL/min (A) (based on SCr of 2.6 mg/dL (H)).,     Dialysis Method (if applicable):  N/A    CBC (last 72 hours):  Recent Labs   Lab Result Units 08/01/24  0647 08/02/24  0321 08/03/24  0327   WBC K/uL 25.44* 28.41* 23.47*   Hemoglobin g/dL 7.8* 7.3* 7.5*   Hematocrit % 26.6* 24.3* 24.8*   Platelets K/uL 169 207 156   Gran % % 87.6* 90.0* 86.0*   Lymph % % 7.2* 5.9* 2.0*   Mono % % 2.5* 1.7* 3.0*   Eosinophil % % 0.2 0.2 0.0   Basophil % % 0.4 0.6 0.0    Differential Method  Automated Automated Manual       Metabolic Panel (last 72 hours):  Recent Labs   Lab Result Units 08/01/24  0647 08/02/24  0321 08/02/24  2335 08/03/24  0327 08/03/24  1357   Sodium mmol/L 135* 135* 137 137 138   Potassium mmol/L 3.9 3.8 3.9 3.8 4.1   Chloride mmol/L 108 108 107 106 104   CO2 mmol/L 15* 12* 17* 19* 17*   Glucose mg/dL 89 94 121* 104 112*   BUN mg/dL 55* 60* 42* 28* 18   Creatinine mg/dL 6.4* 6.5* 4.7* 3.4* 2.6*   Albumin g/dL 1.8* 1.5* 1.7* 1.6* 1.6*   Magnesium mg/dL 2.1 2.2 2.0 1.9 2.1   Phosphorus mg/dL 5.2*  5.2* 5.7*  5.7* 4.2 3.1  3.1 3.3       Vancomycin Administrations:  vancomycin given in the last 96 hours                     vancomycin 2 g in dextrose 5 % 500 mL IVPB (mg) 2,000 mg New Bag 08/02/24 1201                    Microbiologic Results:  Microbiology Results (last 7 days)       Procedure Component Value Units Date/Time    Blood culture [[id number]] Collected: 07/29/24 1345    Order Status: Completed Specimen: Blood Updated: 08/03/24 1612     Blood Culture, Routine No growth after 5 days.    Blood culture [[id number]] Collected: 07/29/24 1346    Order Status: Completed Specimen: Blood Updated: 08/03/24 1612     Blood Culture, Routine No growth after 5 days.    Blood culture [[id number]] Collected: 08/02/24 1153    Order Status: Completed Specimen: Blood from Peripheral, Antecubital, Right Updated: 08/03/24 1412     Blood Culture, Routine No Growth to date      No Growth to date    Blood culture [[id number]] Collected: 08/02/24 1154    Order Status: Completed Specimen: Blood from Peripheral, Forearm, Right Updated: 08/03/24 1412     Blood Culture, Routine No Growth to date      No Growth to date    IV catheter culture [[id number]] Collected: 08/01/24 1038    Order Status: Completed Specimen: Catheter Tip, Tunneled Updated: 08/03/24 1228     Aerobic Culture - Cath tip No growth    Narrative:      Hemodialysis catheter tip culture.    Aerobic culture  [[id number]]  (Abnormal)  (Susceptibility) Collected: 07/31/24 1346    Order Status: Completed Specimen: Wound from Leg, Right Updated: 08/02/24 1122     Aerobic Bacterial Culture PSEUDOMONAS AERUGINOSA  Moderate      Fungus culture [[id number]] Collected: 07/10/24 1630    Order Status: Completed Specimen: Body Fluid from Peritoneal Fluid Updated: 07/30/24 1114     Fungus (Mycology) Culture Culture in progress      No fungus isolated after 2 weeks

## 2024-08-03 NOTE — PROGRESS NOTES
[facility] - Cardiac Medical ICU  Critical Care Medicine  Progress Note    Patient Name: Juhi Taylor  MRN: [id number]  Admission Date: 7/3/2024  Hospital Length of Stay: 31 days  Code Status: Full Code  Attending Provider: Wyatt Valencia MD  Primary Care Provider: Tony Sadler MD   Principal Problem: Clostridium difficile infection    Subjective:     HPI:  Juhi Taylor is a 74-year-old female with a past medical history of CKD stage 5 with recent PD catheter placement on 6/4, RCC s/p right nephrectomy, T2DM, obesity, CAD s/p PCI to Lcx and LAD in 11/2020 and HFmREF who is admitted as a transfer from [facility] for higher level of care and further cardiac evaluation. Nephrology consulted for hemodialysis.     She initially presented on 6/18/24 to [facility] for hypervolemia in the setting of CKD 4-5 for which she had undergone elective PD catheter placement complicated by catheter site leaking. She had a complex hospital course, was initially placed on furosemide gtt without significant improvement and later underwent placement of tunneled line for HD with volume removal. During her second dialysis session she went into PEA arrest for which she was resuscitated, intubated and transferred to ICU. She was noted to have elevated troponin and subsequent TTE found reduced EF from 40-45% to 20-25% for which coronary angiography was recommended. Her hospital course was also complicated by dialysis line infection, significant bleeding around the catheter site, bradycardia and subsequent atrial fibrillation with RVR. Given the catheter site bleeding, anticoagulation was temporarily held. She has reported allergy to amiodarone, was initially placed on diltiazem for atrial fibrillation despite reduced EF. She discussed GOC at outside hospital where it appears that she lacked capacity per note and decided to pursue higher level of care at [facility]. At that time, General Surgery was planning to place a left tunneled  catheter due to concerns of the right side not being suitable     Upon admit, patient appeared drowsy but conversant. Daughter reports that she typically gets lethargic 2/2 to the volume overload due to not receiving dialysis. Patient reported b/l left lower extremity pain. Critical Care Medicine was consulted for emergent trialysis line placement and dialysis.     Ms. Taylor was admitted to MICU on 7/8/24 for urgent dialysis line placement and initiation of dialysis. Patient received short run of CRRT overnight without issue. Encephalopathy improving with continued CRRT treatments. She has had persistent AFib with RVR for which received Digoxin loading dose (renally dosed) and was restarted PO carvedilol. Patient remains in persistent AFib RVR despite therapeutic digoxin level and uptitration of carvedilol dosages so she was started on an amiodarone infusion. She was then transitioned to amio PO and started on heparin infusion for atrial fibrillation. General Surgery removed her peritoneal dialysis catheter on 7/15/24, but upon induction she vomited copious amounts of bile and was endotracheally intubated. She was extubated to nasal cannula on 7/16/24.     She has had difficulties with her tunneled HD line with sluggish flow especially through the venous side.  She would have a 4-hour instillation of tPA in each line before being able to run iHD, and even then it would be stopped before completion.  Interventional Nephrology plans to replace the line at some point next week.  Of note, Palliative Care was also consulted and patient had made her wishes known that she is a DNR code status.  She was still vacillating between replacing her tunneled HD line given her adverse event during her last procedure or going home with hospice.  The family was upset with her decision and April, her niece, asked that Palliative Care not return to see the patient.      Hospital/ICU Course:  Ms. Taylor was admitted to MICU on  7/8 for urgent dialysis line placement and initiated of HD. Patient received short run of CRRT overnight without issue. Encephalopathy improving with continued CRRT treatments. Persistent AFib with RVR, received Digoxin loading dose (renally dosed), restarted PO Carvedilol. Patient remains in persistent AFib RVR despite therapeutic digoxin level and uptitration of Carvedilol levels, started on Amiodarone infusion overnight. Transitioned to amio PO, started on heparin gtt for afib AC. Tolerated HD well overnight without complications and less encephalopathic upon exam. Planning for gen surg to remove PD catheter. Upon induction pt vomited copious amounts of bile, was intubated. PD cathter removed successfully. Extubated to NC 7/16.     7/18:  Patient made DNR after Palliative Care discussions yesterday.  Tolerated SLED 12 hours overnight.  Discussed difficulties regarding HD line with Interventional Nephrology and plans to replace it next week if patient it amenable.  Patient was stepped down from ICU.  7/19:  Stepped back up to medical ICU with plans for overnight SLED x 3 days.    On 7/19 Nephrology requested that the patient be stepped back up to the medical ICU for SLED. A temporary trialysis HD catheter was placed on 7/20/24 in the groin. She also began having diarrhea, which tested positive for Clostridium difficile so she was started on PO vancomycin 7/22/24. HD trial on 7/24; remained HDS. Ready for stepdown.    Stepped down to  on 7/26/24. On 8/2/2024, [facility] re-consulted for hypotension (70s/40s) and increased somnolence.    8/2: Nephrology following for dialysis needs. Placed on vancomycin and zosyn due to aerobic culture growing pseudomonas. On levophed 0.1. D/c entresto and metoprolol. Will likely need to pull left femoral line. LIJ placed. 8/3: She developed new right leg pain. Added lidocaine patches and norco. She has needed her dilaudid PRN. Later in the afternoon she developed tachycardia and soft  pressures with signs of distress. Code cart was rolled into the room. AED pads were placed but never used. Tachycardia improved but she required had increased pressor requirement. A-line plan to be place to monitor pressures.       Interval History/Significant Events: NAEON. Patient developed new right leg pain this morning. Leg is swollen without erythema. The daughter, Adilene, was bedside. We discussed goals of care with patient and daughter who both agreed that she wouldn't want to be intubated or have CPR done on her. The 2nd daughter isn't open to making the patient DNR. The brother is leaning on the side of DNR. Ongoing discussion with the whole family need to be done to come to an agreement.     Review of Systems   Unable to perform ROS: Acuity of condition     Objective:     Vital Signs (Most Recent):  Temp: 98.4 °F (36.9 °C) (08/03/24 1505)  Pulse: (!) 124 (08/03/24 1600)  Resp: 13 (08/03/24 1600)  BP: 131/74 (08/03/24 1600)  SpO2: 97 % (08/03/24 1600) Vital Signs (24h Range):  Temp:  [96.2 °F (35.7 °C)-98.4 °F (36.9 °C)] 98.4 °F (36.9 °C)  Pulse:  [] 124  Resp:  [13-34] 13  SpO2:  [86 %-100 %] 97 %  BP: ()/() 131/74   Weight: 128.5 kg (283 lb 4.7 oz)  Body mass index is 44.37 kg/m².      Intake/Output Summary (Last 24 hours) at 8/3/2024 1616  Last data filed at 8/3/2024 1500  Gross per 24 hour   Intake 4530.16 ml   Output 3781 ml   Net 749.16 ml          Physical Exam  Vitals and nursing note reviewed.   Constitutional:       General: She is sleeping.      Appearance: She is morbidly obese. She is ill-appearing.   HENT:      Mouth/Throat:      Mouth: Mucous membranes are dry.      Pharynx: Oropharynx is clear.   Eyes:      Extraocular Movements: Extraocular movements intact.      Pupils: Pupils are equal, round, and reactive to light.   Cardiovascular:      Rate and Rhythm: Regular rhythm. Tachycardia present.   Pulmonary:      Effort: Pulmonary effort is normal.   Abdominal:       "Palpations: Abdomen is soft.   Genitourinary:     Comments: Damon and Flexi  Musculoskeletal:         General: Swelling present.      Right lower leg: 3+ Edema present.      Left lower leg: 3+ Edema present.   Skin:     General: Skin is warm and dry.      Capillary Refill: Capillary refill takes less than 2 seconds.   Neurological:      General: No focal deficit present.      Mental Status: She is easily aroused. She is lethargic and disoriented.   Psychiatric:         Behavior: Behavior is cooperative.            Vents:  Vent Mode: Spont (07/16/24 1003)  Set Rate: 15 BPM (07/16/24 0903)  Vt Set: 420 mL (07/16/24 0903)  Pressure Support: 5 cmH20 (07/16/24 1003)  PEEP/CPAP: 5 cmH20 (07/16/24 1003)  Oxygen Concentration (%): 30 (08/02/24 2359)  Peak Airway Pressure: 10 cmH20 (07/16/24 1003)  Plateau Pressure: 0 cmH20 (07/16/24 1003)  Total Ve: 5.45 L/m (07/16/24 1003)  Negative Inspiratory Force (cm H2O): 0 (07/16/24 1003)  Lines/Drains/Airways       Central Venous Catheter Line  Duration             Trialysis (Dialysis) Catheter 08/02/24 0452 left internal jugular 1 day              Drain  Duration                  Rectal Tube 07/28/24 0000 6 days                  Significant Labs:    CBC/Anemia Profile:  Recent Labs   Lab 08/02/24  0321 08/03/24  0327   WBC 28.41* 23.47*   HGB 7.3* 7.5*   HCT 24.3* 24.8*    156   MCV 89 84   RDW 23.0* 22.6*        Chemistries:  Recent Labs   Lab 08/02/24  2335 08/03/24  0327 08/03/24  1357    137 138   K 3.9 3.8 4.1    106 104   CO2 17* 19* 17*   BUN 42* 28* 18   CREATININE 4.7* 3.4* 2.6*   CALCIUM 8.5* 7.9* 8.3*   ALBUMIN 1.7* 1.6* 1.6*   MG 2.0 1.9 2.1   PHOS 4.2 3.1  3.1 3.3       All pertinent labs within the past 24 hours have been reviewed.    Significant Imaging:  I have reviewed all pertinent imaging results/findings within the past 24 hours.    ABG  No results for input(s): "PH", "PO2", "PCO2", "HCO3", "BE" in the last 168 hours.  Assessment/Plan: "     Neuro  Acute encephalopathy  Likely multifactorial in the setting of septic shock and uremia  - Continue to monitor      Cardiac/Vascular  Atrial fibrillation with RVR  - AFib with RVR appears new following cardiac arrest at OSH  - Patient endorses amiodarone reaction with itching, rash, and oral burning. Refused Sotalol, DCCV, and AICD placement at OSH.   - Was placed on Diltizem infusion and transitioned to PO metoprolol. Amio gtt restarted overnight 7/11 s/t uncontrolled afib with RVR. Transitioned to PO amio  - Patient's rate remained elevated despite amiodarone. Appeared to respond better to metoprolol.   - Hold lopressor in setting of septic shock  - Apixaban on hold. Anticoagulation with a heparin infusion    Acute on chronic combined systolic and diastolic heart failure  Echo    Left Ventricle: The left ventricle is moderately dilated. Normal wall thickness. Global hypokinesis present. There is severely reduced systolic function with a visually estimated ejection fraction of 20 - 25%.    Right Ventricle: Mild right ventricular enlargement. Wall thickness is normal. Right ventricle wall motion has global hypokinesis. Systolic function is moderately reduced.    Left Atrium: Left atrium is severely dilated.    Right Atrium: Right atrium is severely dilated.    Aortic Valve: The aortic valve is a trileaflet valve. There is mild aortic valve sclerosis.    Mitral Valve: There is mild regurgitation.    Tricuspid Valve: There is severe regurgitation.    Pulmonary Artery: There is moderate pulmonary hypertension. The estimated pulmonary artery systolic pressure is 61 mmHg.    IVC/SVC: Elevated venous pressure at 15 mmHg.    Pericardium: There is a trivial circumferential effusion. No indication of cardiac tamponade.    - Will require interventional cardiology for LHC vs PET for reduced EF once euvolemic  - LifeVest prior to discharge  - Entresto recommended by cardiology, but currently on hold due to sepsis  -  Strict I/Os    Essential hypertension  --Takes carvedilol + clonidine at home.   -- Hold antihypertensives due to hypotension.       Renal/  ESRD (end stage renal disease)  - PD catheter removed 07/15  - Tunneled dialysis catheter was not functioning and therefore removed on 8/1 by nephrology  - Trialysis catheter placed in left fem on 7/20  - Tolerating iHD  - Start albumin 25 g q8h x 3 days per Nephrology, ending 7/25   - Attempted iHD on 8/1 but resistance with left fem trialysis line; only able to complete ~20 minutes. Will require new trialysis line to be placed by anesthesiology per nephrology recs  - 8/2: will attempt SLED per nephrology    ID  * Clostridium difficile infection  --Diagnosed 7/22/24.  -- Currently on fidaxomicin     Septic shock  Aerobic culture positive for pseudomonas. Likely infected left femoral line    - Tentatively plan to remove left femoral line  - Vancomycin and Zosyn ordered  - Levophed and vasopressin MAP > 65  - Repeat blood cultures NGTD    Hematology  Acute deep vein thrombosis (DVT) of femoral vein of left lower extremity  US Lower Extremity Veins Bilaterally with left lower extremity DVT   - Continue heparin    Endocrine  Severe obesity (BMI >= 40)  --Morbid obesity complicating her medical care.    Type 2 diabetes mellitus with microalbuminuria, without long-term current use of insulin  -- Latest A1C 6.8; takes metformin at home.   -- Target -180. Within goal.   -- Continue to monitor.   -- SSI ordered    GI  Diarrhea  + C.diff, currently on fidaxomicin    - Flexi in place; strict I/Os  - Increased output may be contributing to hypotensive episode    Palliative Care  Goals of care, counseling/discussion  Code status changed to Full Code overnight on 8/1 by primary team. Spoke with daughter at bedside who stated that she would want her mother to be stepped back up to the ICU if vasopressors were required for hypotension    - Ongoing goals of discussion with family  needed to come to an agreement        Other  Suspected deep tissue injury  Appreciate wound care recs    Debility  --PT/OT.       Critical Care Daily Checklist:    A: Awake: RASS Goal/Actual Goal: RASS Goal: 0-->alert and calm  Actual:     B: Spontaneous Breathing Trial Performed? Spon. Breathing Trial Initiated?: Initiated (07/16/24 1003)   C: SAT & SBT Coordinated?  no                      D: Delirium: CAM-ICU Overall CAM-ICU: Positive   E: Early Mobility Performed? No   F: Feeding Goal: Goals: Meet % EEN, EPN by RD f/u date  Status: Nutrition Goal Status: progressing towards goal   Current Diet Order   Procedures    Diet Renal      AS: Analgesia/Sedation no   T: Thromboembolic Prophylaxis Heparin   H: HOB > 300 Yes   U: Stress Ulcer Prophylaxis (if needed) None   G: Glucose Control SSI   B: Bowel Function Stool Occurrence: 1   I: Indwelling Catheter (Lines & Damon) Necessity Rectal Tube, LIJ   D: De-escalation of Antimicrobials/Pharmacotherapies no    Plan for the day/ETD Continue abx and pressors, GOC    Code Status:  Family/Goals of Care: Full Code         Critical secondary to Patient has a condition that poses threat to life and bodily function: septic shock      Critical care was time spent personally by me on the following activities: development of treatment plan with patient or surrogate and bedside caregivers, discussions with consultants, evaluation of patient's response to treatment, examination of patient, ordering and performing treatments and interventions, ordering and review of laboratory studies, ordering and review of radiographic studies, pulse oximetry, re-evaluation of patient's condition. This critical care time did not overlap with that of any other provider or involve time for any procedures.     Shoaib Quiroz MD  Critical Care Medicine  [facility] - Cardiac Medical ICU

## 2024-08-03 NOTE — PROGRESS NOTES
08/02/24 2100   Treatment   Treatment Type SLED   Treatment Status New start   Dialysis Machine Number [id number]   Dialyzer Time (hours) 0   BVP (Liters) 0 L   Solutions Labeled and Current  Yes   Access Left;Temporary Cath;IJ   Catheter Dressing Intact  Yes   Alarms Engaged Yes   CRRT Comments SLED started   Prescription   Time (Hours) 12   Dialysate K + (mEq/L) 4   Dialysate CA + (mEq/L) 2.25   Dialysate HCO3 - (Bicarb) (mEq/L) 35   Dialysate Na + (mEq/L) 140   Cartridge Type   (R300)   Dialysate Flow Rate (mL/min) 200   UF Goal Rate 350 mL/hr     SLED for 12 hours started per MD orders. VS stable to start tx. Starting UF set at 200 with a goal of 350. Report given to primary nurse.

## 2024-08-04 NOTE — PROGRESS NOTES
08/04/24 0201   Treatment   Treatment Type SLED   Treatment Status Blood returned   Dialyzer Time (hours) 4.59   BVP (Liters) 44.3 L   CRRT Comments Tx completed; blood returned

## 2024-08-04 NOTE — PROGRESS NOTES
08/03/24 2009 08/03/24 8151   Treatment   Treatment Type SLED SLED   Treatment Status Clotting;Blood returned Restart   Dialysis Machine Number  --  [id number]   Dialyzer Time (hours) 1.5 0   BVP (Liters) 15.6 L 0 L   Solutions Labeled and Current   --  Yes   Access  --  Temporary Cath;Left;IJ   Catheter Dressing Intact   --  Yes   Alarms Engaged  --  Yes   CRRT Comments Arterial pressure alarms; unable to clear; Pt rinsed back by ICU RN SLED restarted

## 2024-08-04 NOTE — PROGRESS NOTES
[facility]  Critical Care Medicine  Progress Note    Patient Name: Juhi Taylor  MRN: [id number]  Admission Date: 7/3/2024  Hospital Length of Stay: 32 days  Code Status: Full Code  Attending Provider: Wyatt Valencia MD  Primary Care Provider: Tony Sadler MD   Principal Problem: Clostridium difficile infection    Subjective:     HPI:  Juhi Taylor is a 74-year-old female with a past medical history of CKD stage 5 with recent PD catheter placement on 6/4, RCC s/p right nephrectomy, T2DM, obesity, CAD s/p PCI to Lcx and LAD in 11/2020 and HFmREF who is admitted as a transfer from [facility] for higher level of care and further cardiac evaluation. Nephrology consulted for hemodialysis.     She initially presented on 6/18/24 to [facility] for hypervolemia in the setting of CKD 4-5 for which she had undergone elective PD catheter placement complicated by catheter site leaking. She had a complex hospital course, was initially placed on furosemide gtt without significant improvement and later underwent placement of tunneled line for HD with volume removal. During her second dialysis session she went into PEA arrest for which she was resuscitated, intubated and transferred to ICU. She was noted to have elevated troponin and subsequent TTE found reduced EF from 40-45% to 20-25% for which coronary angiography was recommended. Her hospital course was also complicated by dialysis line infection, significant bleeding around the catheter site, bradycardia and subsequent atrial fibrillation with RVR. Given the catheter site bleeding, anticoagulation was temporarily held. She has reported allergy to amiodarone, was initially placed on diltiazem for atrial fibrillation despite reduced EF. She discussed GOC at outside hospital where it appears that she lacked capacity per note and decided to pursue higher level of care at [facility]. At that time, General Surgery was planning to place a left tunneled  catheter due to concerns of the right side not being suitable     Upon admit, patient appeared drowsy but conversant. Daughter reports that she typically gets lethargic 2/2 to the volume overload due to not receiving dialysis. Patient reported b/l left lower extremity pain. Critical Care Medicine was consulted for emergent trialysis line placement and dialysis.     Ms. Taylor was admitted to MICU on 7/8/24 for urgent dialysis line placement and initiation of dialysis. Patient received short run of CRRT overnight without issue. Encephalopathy improving with continued CRRT treatments. She has had persistent AFib with RVR for which received Digoxin loading dose (renally dosed) and was restarted PO carvedilol. Patient remains in persistent AFib RVR despite therapeutic digoxin level and uptitration of carvedilol dosages so she was started on an amiodarone infusion. She was then transitioned to amio PO and started on heparin infusion for atrial fibrillation. General Surgery removed her peritoneal dialysis catheter on 7/15/24, but upon induction she vomited copious amounts of bile and was endotracheally intubated. She was extubated to nasal cannula on 7/16/24.     She has had difficulties with her tunneled HD line with sluggish flow especially through the venous side.  She would have a 4-hour instillation of tPA in each line before being able to run iHD, and even then it would be stopped before completion.  Interventional Nephrology plans to replace the line at some point next week.  Of note, Palliative Care was also consulted and patient had made her wishes known that she is a DNR code status.  She was still vacillating between replacing her tunneled HD line given her adverse event during her last procedure or going home with hospice.  The family was upset with her decision and April, her niece, asked that Palliative Care not return to see the patient.      Hospital/ICU Course:  Ms. Taylor was admitted to MICU on  7/8 for urgent dialysis line placement and initiated of HD. Patient received short run of CRRT overnight without issue. Encephalopathy improving with continued CRRT treatments. Persistent AFib with RVR, received Digoxin loading dose (renally dosed), restarted PO Carvedilol. Patient remains in persistent AFib RVR despite therapeutic digoxin level and uptitration of Carvedilol levels, started on Amiodarone infusion overnight. Transitioned to amio PO, started on heparin gtt for afib AC. Tolerated HD well overnight without complications and less encephalopathic upon exam. Planning for gen surg to remove PD catheter. Upon induction pt vomited copious amounts of bile, was intubated. PD cathter removed successfully. Extubated to NC 7/16.     7/18:  Patient made DNR after Palliative Care discussions yesterday.  Tolerated SLED 12 hours overnight.  Discussed difficulties regarding HD line with Interventional Nephrology and plans to replace it next week if patient it amenable.  Patient was stepped down from ICU.  7/19:  Stepped back up to medical ICU with plans for overnight SLED x 3 days.    On 7/19 Nephrology requested that the patient be stepped back up to the medical ICU for SLED. A temporary trialysis HD catheter was placed on 7/20/24 in the groin. She also began having diarrhea, which tested positive for Clostridium difficile so she was started on PO vancomycin 7/22/24. HD trial on 7/24; remained HDS. Ready for stepdown.    Stepped down to  on 7/26/24. On 8/2/2024, [facility] re-consulted for hypotension (70s/40s) and increased somnolence.    8/2: Nephrology following for dialysis needs. Placed on vancomycin and zosyn due to concern for line infection. On levophed 0.1. D/c entresto and metoprolol. Will likely need to pull left femoral line. LIJ placed.     8/3: She developed new right leg pain. Added lidocaine patches and norco. She has needed her dilaudid PRN. Later in the afternoon she developed tachycardia and soft  pressures with signs of distress. Code cart was rolled into the room. AED pads were placed but never used. Tachycardia improved but she required had increased pressor requirement. A-line plan to be place to monitor pressures.     8/4: Levophed at 0.08, vasopressin at 0.04. Digoxin given to aid in high HR. Discussion with two sisters and team regarding current situation and code status addressed. Family would like to discuss among themselves and will let us know.       Interval History/Significant Events: HR sustaining in 130-140s range. Digoxin given. Discussion with 2 daughters and team regarding current situation and code status. Family will discuss and get back with us. At this time, no change in code status.    Review of Systems   Unable to perform ROS: Acuity of condition     Objective:     Vital Signs (Most Recent):  Temp: 99 °F (37.2 °C) (08/04/24 0705)  Pulse: (!) 118 (08/04/24 1105)  Resp: (!) 24 (08/04/24 1105)  BP: (!) 140/63 (08/04/24 1105)  SpO2: 95 % (08/04/24 1105) Vital Signs (24h Range):  Temp:  [96.7 °F (35.9 °C)-99 °F (37.2 °C)] 99 °F (37.2 °C)  Pulse:  [] 118  Resp:  [13-28] 24  SpO2:  [80 %-100 %] 95 %  BP: ()/() 140/63   Weight: 128.5 kg (283 lb 4.7 oz)  Body mass index is 44.37 kg/m².      Intake/Output Summary (Last 24 hours) at 8/4/2024 1202  Last data filed at 8/4/2024 0800  Gross per 24 hour   Intake 1271.67 ml   Output 2035 ml   Net -763.33 ml          Physical Exam  Vitals and nursing note reviewed.   Constitutional:       General: She is sleeping.      Appearance: She is morbidly obese. She is ill-appearing.   HENT:      Mouth/Throat:      Mouth: Mucous membranes are dry.      Pharynx: Oropharynx is clear.   Eyes:      Extraocular Movements: Extraocular movements intact.      Pupils: Pupils are equal, round, and reactive to light.   Cardiovascular:      Rate and Rhythm: Regular rhythm. Tachycardia present.   Pulmonary:      Effort: Pulmonary effort is normal.  "  Abdominal:      Palpations: Abdomen is soft.   Genitourinary:     Comments: Damon and Flexi  Musculoskeletal:         General: Swelling present.      Right lower leg: 3+ Edema present.      Left lower leg: 3+ Edema present.   Skin:     General: Skin is warm and dry.      Capillary Refill: Capillary refill takes less than 2 seconds.   Neurological:      General: No focal deficit present.      Mental Status: She is easily aroused. She is lethargic and disoriented.   Psychiatric:         Behavior: Behavior is cooperative.            Vents:  Vent Mode: Spont (07/16/24 1003)  Set Rate: 15 BPM (07/16/24 0903)  Vt Set: 420 mL (07/16/24 0903)  Pressure Support: 5 cmH20 (07/16/24 1003)  PEEP/CPAP: 5 cmH20 (07/16/24 1003)  Oxygen Concentration (%): 30 (08/02/24 2359)  Peak Airway Pressure: 10 cmH20 (07/16/24 1003)  Plateau Pressure: 0 cmH20 (07/16/24 1003)  Total Ve: 5.45 L/m (07/16/24 1003)  Negative Inspiratory Force (cm H2O): 0 (07/16/24 1003)  Lines/Drains/Airways       Central Venous Catheter Line  Duration             Trialysis (Dialysis) Catheter 08/02/24 0452 left internal jugular 2 days              Drain  Duration                  Rectal Tube 07/28/24 0000 7 days                  Significant Labs:    CBC/Anemia Profile:  Recent Labs   Lab 08/03/24  0327 08/04/24  0352   WBC 23.47* 21.75*   HGB 7.5* 7.2*   HCT 24.8* 22.8*    105*   MCV 84 85   RDW 22.6* 23.6*        Chemistries:  Recent Labs   Lab 08/03/24  1357 08/03/24  2206 08/04/24  0352    138 138   K 4.1 4.1 4.3    106 107   CO2 17* 17* 16*   BUN 18 14 11   CREATININE 2.6* 2.1* 1.7*   CALCIUM 8.3* 7.8* 7.6*   ALBUMIN 1.6* 1.5* 1.5*   MG 2.1 1.8 2.1   PHOS 3.3 2.7 2.9  2.9       All pertinent labs within the past 24 hours have been reviewed.    Significant Imaging:  I have reviewed all pertinent imaging results/findings within the past 24 hours.    ABG  No results for input(s): "PH", "PO2", "PCO2", "HCO3", "BE" in the last 168 " hours.  Assessment/Plan:     Neuro  Acute encephalopathy  Likely multifactorial in the setting of septic shock and uremia  - Continue to monitor      Cardiac/Vascular  Atrial fibrillation with RVR  - AFib with RVR appears new following cardiac arrest at OSH  - Patient endorses amiodarone reaction with itching, rash, and oral burning. Refused Sotalol, DCCV, and AICD placement at OSH.   - Was placed on Diltizem infusion and transitioned to PO metoprolol. Amio gtt restarted overnight 7/11 s/t uncontrolled afib with RVR. Transitioned to PO amio  - Patient's rate remained elevated despite amiodarone. Appeared to respond better to metoprolol.   - Hold lopressor in setting of septic shock  - Apixaban on hold.       Recommendation:    [ ] Anticoagulation with a heparin infusion  [ ] Digoxin 250 mg x1 dose to assist in lowering tachycardia    Acute on chronic combined systolic and diastolic heart failure  Echo    Left Ventricle: The left ventricle is moderately dilated. Normal wall thickness. Global hypokinesis present. There is severely reduced systolic function with a visually estimated ejection fraction of 20 - 25%.    Right Ventricle: Mild right ventricular enlargement. Wall thickness is normal. Right ventricle wall motion has global hypokinesis. Systolic function is moderately reduced.    Left Atrium: Left atrium is severely dilated.    Right Atrium: Right atrium is severely dilated.    Aortic Valve: The aortic valve is a trileaflet valve. There is mild aortic valve sclerosis.    Mitral Valve: There is mild regurgitation.    Tricuspid Valve: There is severe regurgitation.    Pulmonary Artery: There is moderate pulmonary hypertension. The estimated pulmonary artery systolic pressure is 61 mmHg.    IVC/SVC: Elevated venous pressure at 15 mmHg.    Pericardium: There is a trivial circumferential effusion. No indication of cardiac tamponade.    - Will require interventional cardiology for LHC vs PET for reduced EF once  euvolemic  - LifeVest prior to discharge  - Entresto recommended by cardiology, but currently on hold due to sepsis  - Strict I/Os  - dialysis for volume status     Essential hypertension  --Takes carvedilol + clonidine at home.   -- Hold antihypertensives due to hypotension.       Renal/  ESRD (end stage renal disease)  - PD catheter removed 07/15  - Tunneled dialysis catheter was not functioning and therefore removed on 8/1 by nephrology  - Trialysis catheter placed in left fem on 7/20  - Tolerating iHD  - Start albumin 25 g q8h x 3 days per Nephrology, ending 7/25   - Attempted iHD on 8/1 but resistance with left fem trialysis line; only able to complete ~20 minutes. Will require new trialysis line to be placed by anesthesiology per nephrology recs  - 8/2: will attempt SLED per nephrology    ID  * Clostridium difficile infection  --Diagnosed 7/22/24.  -- Currently on fidaxomicin     Septic shock  Concern for left femoral line infection.      - Tentatively plan to remove left femoral line  - Continue Zosyn   - Levophed and vasopressin MAP > 65  - Repeat blood cultures NGTD    Hematology  Acute deep vein thrombosis (DVT) of femoral vein of left lower extremity  US Lower Extremity Veins Bilaterally with left lower extremity DVT   - Continue heparin    Endocrine  Severe obesity (BMI >= 40)  --Morbid obesity complicating her medical care.    Type 2 diabetes mellitus with microalbuminuria, without long-term current use of insulin  -- Latest A1C 6.8; takes metformin at home.   -- Target -180. Within goal.   -- Continue to monitor.   -- SSI ordered    GI  Diarrhea  + C.diff, currently on fidaxomicin    - Flexi in place; strict I/Os  - Increased output may be contributing to hypotensive episode    Palliative Care  Goals of care, counseling/discussion  Code status changed to Full Code overnight on 8/1 by primary team. Spoke with daughter at bedside who stated that she would want her mother to be stepped back up to  the ICU if vasopressors were required for hypotension    - Ongoing goals of discussion with family needed to come to an agreement        Other  Suspected deep tissue injury  Appreciate wound care recs    Debility  --PT/OT.       Critical Care Daily Checklist:    A: Awake: RASS Goal/Actual Goal: RASS Goal: 0-->alert and calm  Actual:     B: Spontaneous Breathing Trial Performed? Spon. Breathing Trial Initiated?: Initiated (07/16/24 1003)   C: SAT & SBT Coordinated?  None                      D: Delirium: CAM-ICU Overall CAM-ICU: Positive   E: Early Mobility Performed? None   F: Feeding Goal: Goals: Meet % EEN, EPN by RD f/u date  Status: Nutrition Goal Status: progressing towards goal   Current Diet Order   Procedures    Diet Renal      AS: Analgesia/Sedation None   T: Thromboembolic Prophylaxis Heparin   H: HOB > 300 Yes   U: Stress Ulcer Prophylaxis (if needed) None   G: Glucose Control SSI   B: Bowel Function Stool Occurrence: 1   I: Indwelling Catheter (Lines & Damon) Necessity Rectal Tube, LIJ   D: De-escalation of Antimicrobials/Pharmacotherapies Continue Zosyn, D/c Vancomycin    Plan for the day/ETD Wean off Levophed, Continue Zosyn    Code Status:  Family/Goals of Care: Full Code         Critical secondary to Patient has a condition that poses threat to life and bodily function: sepsis requiring pressor support      Critical care was time spent personally by me on the following activities: development of treatment plan with patient or surrogate and bedside caregivers, discussions with consultants, evaluation of patient's response to treatment, examination of patient, ordering and performing treatments and interventions, ordering and review of laboratory studies, ordering and review of radiographic studies, pulse oximetry, re-evaluation of patient's condition. This critical care time did not overlap with that of any other provider or involve time for any procedures.     Mawadah Samad, MD  Critical Care  Medicine  [facility]

## 2024-08-04 NOTE — ASSESSMENT & PLAN NOTE
- AFib with RVR appears new following cardiac arrest at OSH  - Patient endorses amiodarone reaction with itching, rash, and oral burning. Refused Sotalol, DCCV, and AICD placement at OSH.   - Was placed on Diltizem infusion and transitioned to PO metoprolol. Amio gtt restarted overnight 7/11 s/t uncontrolled afib with RVR. Transitioned to PO amio  - Patient's rate remained elevated despite amiodarone. Appeared to respond better to metoprolol.   - Hold lopressor in setting of septic shock  - Apixaban on hold.       Recommendation:    [ ] Anticoagulation with a heparin infusion  [ ] Digoxin 250 mg x1 dose to assist in lowering tachycardia

## 2024-08-04 NOTE — PROGRESS NOTES
Pharmacokinetic Assessment Follow Up: IV Vancomycin    Therapy with vancomycin discontinued by provider. Pharmacy will sign off, please re-consult as needed.    Savannah Hanson, PharmD, BCCCP  [id number]

## 2024-08-04 NOTE — PLAN OF CARE
MICU DAILY GOALS     Family/Goals of care/Code Status   Code Status: DNR    24H Vital Sign Range  Temp:  [96.7 °F (35.9 °C)-99 °F (37.2 °C)]   Pulse:  []   Resp:  [11-28]   BP: ()/()   SpO2:  [80 %-100 %]      Shift Events (include procedures and significant events)   Per family discussion with the treatment team, code status changed to DNR.    AWAKE RASS: Goal - RASS Goal: 0-->alert and calm  Actual - RASS (Jha Agitation-Sedation Scale): alert and calm    Restraint necessity: Not necessary   BREATHE SBT: Not intubated    Coordinate A & B, analgesics/sedatives Pain: managed   SAT: Not intubated   Delirium CAM-ICU: Overall CAM-ICU: Positive   Early(intubated/ Progressive (non-intubated) Mobility MOVE Screen (INTUBATED ONLY): Not intubated    Activity: Activity Management: Rolling - L1   Feeding/Nutrition Diet order: Diet/Nutrition Received: NPO, Specialty Diet/Nutrition Received: renal diet   Thrombus DVT prophylaxis: VTE Required Core Measure: Pharmacological prophylaxis initiated/maintained   HOB Elevation Head of Bed (HOB) Positioning: HOB at 30-45 degrees   Ulcer Prophylaxis GI: no   Glucose control managed Glycemic Management: blood glucose monitored   Skin Skin assessed during: Daily Assessment    Sacrum intact/not altered? No  Heels intact/not altered? Yes  Surgical wound? Yes    CHECK ONE!   (no altered skin or altered skin) and sub boxes:  [] No Altered Skin Integrity Present    []Prevention Measures Documented    [x] Altered Skin Integrity Present or Discovered   [x] LDA present in EPIC, daily doc completed              [] LDA added if not in EPIC (describe wound).                    When describing wound, do not stage, use descriptive words only.    [] Wound Image Taken (required on admit,                   transfer/discharge and every Tuesday)    Wound Care Consulted? Yes    4 EYES:  Attending Nurse (1st set of eyes): NEENA Perez    Second RN/Staff Member (2nd set of eyes): Letty  RN   Bowel Function diarrhea    Indwelling Catheter Necessity      [REMOVED] Trialysis (Dialysis) Catheter 07/08/24 2210 left internal jugular-Line Necessity Review: CRRT/HD  [REMOVED]      Hemodialysis Catheter 07/11/24 1219 left subclavian-Line Necessity Review: CRRT/HD  [REMOVED] Trialysis (Dialysis) Catheter 07/20/24 1120 left femoral-Line Necessity Review: CRRT/HD  Trialysis (Dialysis) Catheter 08/02/24 0452 left internal jugular-Line Necessity Review: CRRT/HD     De-escalation Antibiotics No        VS and assessment per flow sheet, patient progressing towards goals as tolerated, plan of care reviewed with family, all concerns addressed, will continue to monitor.

## 2024-08-04 NOTE — PROGRESS NOTES
[facility] - Cardiac Medical ICU  Nephrology  Progress Note    Patient Name: Juhi Taylor  MRN: [id number]  Admission Date: 7/3/2024  Hospital Length of Stay: 32 days  Attending Provider: Wyatt Valencia MD   Primary Care Physician: Tony Sadler MD  Principal Problem:Clostridium difficile infection    Subjective:     HPI: 74-year-old female with prior history of CKD stage 5 with recent PD catheter placement on 6/4, RCC s/p right nephrectomy, T2DM, obesity, CAD s/p PCI to Lcx and LAD in 11/2020 and HFmREF who is admitted as a transfer from [facility] for higher level of care and further cardiac evaluation. Nephrology consulted for hemodialysis.     She initially presented on 6/18/24 to [facility] for hypervolemia in the setting of CKD 4-5 for which she had undergone elective PD catheter placement complicated by catheter site leaking. She had a complex hospital course, was initially placed on furosemide gtt without significant improvement and later underwent placement of tunneled line for HD with volume removal. During her second dialysis session she went into PEA arrest for which she was resuscitated, intubated and transferred to ICU. She was noted to have elevated troponin and subsequent TTE found reduced EF from 40-45% to 20-25% for which coronary angiography was recommended. Her hospital course was also complicated by dialysis line infection, significant bleeding around the catheter site, bradycardia and subsequent atrial fibrillation with RVR. Given the catheter site bleeding, anticoagulation was temporarily held. She has reported allergy to amiodarone, was initially placed on diltiazem for atrial fibrillation despite reduced EF. She discussed GOC at outside hospital where it appears that she lacked capacity per note and decided [place] [facility] At that time, gen surgery was planning to place a left tunneled catheter due to concerns of the right side not being suitable    Upon admit,  patient appeared drowsy but conversant. Daughter reports that she typically gets lethargic 2/2 to the volume overload due to not receiving dialysis. Patient reported b/l left lower extremity pain. Daughter concerned about her not having dialysis in 4 days and wants a session today.     Interval History: Patient seen this morning  and remains on Levo and Vaso for blood pressure support. Patient completed SLED early this morning. Labs after SLED was completed were acceptable.     Review of patient's allergies indicates:   Allergen Reactions    Percocet [oxycodone-acetaminophen] Itching    Januvia [sitagliptin]     Jardiance [empagliflozin]      Leg cramps    Lipitor [atorvastatin] Other (See Comments)     Severe leg pain    Linaclotide Other (See Comments) and Nausea And Vomiting     Does not remember    Lubiprostone Other (See Comments) and Palpitations     Does not remember     Current Facility-Administered Medications   Medication Frequency    0.9%  NaCl infusion (CRRT USE ONLY) Continuous    acetaminophen tablet 1,000 mg Q8H PRN    alteplase injection 4 mg Once    cholestyramine 4 gram packet 4 g BID    dextromethorphan-guaiFENesin  mg/5 ml liquid 5 mL Q4H PRN    dextrose 10% bolus 125 mL 125 mL PRN    dextrose 10% bolus 250 mL 250 mL PRN    diphenhydrAMINE capsule 25 mg Q6H PRN    duke's soln (benadryl 30 mL, mylanta 30 mL, LIDOcaine 30 mL, nystatin 30 mL) 120 mL QID    epoetin alaina injection 6,430 Units Every Mon, Wed, Fri    fidaxomicin tablet 200 mg BID    glucagon (human recombinant) injection 1 mg PRN    glucose chewable tablet 16 g PRN    glucose chewable tablet 24 g PRN    heparin (porcine) injection 1,000 Units PRN    heparin 25,000 units in dextrose 5% (100 units/ml) IV bolus from bag LOW INTENSITY nomogram - [facility] PRN    heparin 25,000 units in dextrose 5% (100 units/ml) IV bolus from bag LOW INTENSITY nomogram - [facility] PRN    heparin 25,000 units in dextrose 5% 250 mL (100 units/mL) infusion LOW  INTENSITY nomogram - [facility] Continuous    HYDROcodone-acetaminophen 5-325 mg per tablet 1 tablet Q6H PRN    HYDROmorphone injection 0.5 mg Q2H PRN    hyoscyamine SL tablet 0.125 mg Q4H PRN    insulin aspart U-100 pen 0-10 Units QID (AC + HS) PRN    LIDOcaine 5 % patch 1 patch Q24H    LIDOcaine 5 % patch 2 patch Q24H    magnesium sulfate 2g in water 50mL IVPB (premix) PRN    metoprolol injection 5 mg Q6H PRN    miconazole NITRATE 2 % top powder BID    midodrine tablet 10 mg Daily PRN    naloxone 0.4 mg/mL injection 0.02 mg PRN    NORepinephrine 32 mg in D5W 250 mL infusion Continuous    ondansetron injection 4 mg Q6H PRN    piperacillin-tazobactam (ZOSYN) 4.5 g in D5W 100 mL IVPB (MB+) Q8H    simethicone chewable tablet 80 mg TID PRN    sodium chloride 0.9% bolus 250 mL 250 mL PRN    sodium chloride 0.9% flush 10 mL PRN    sodium chloride 0.9% flush 10 mL PRN    sodium phosphate 20.01 mmol in D5W 250 mL IVPB PRN    sodium phosphate 30 mmol in D5W 250 mL IVPB PRN    sodium phosphate 39.99 mmol in D5W 250 mL IVPB PRN    vancomycin - pharmacy to dose pharmacy to manage frequency    vasopressin (PITRESSIN) 0.2 Units/mL in D5W 100 mL infusion Continuous       Objective:     Vital Signs (Most Recent):  Temp: 96.7 °F (35.9 °C) (08/03/24 2315)  Pulse: (!) 146 (08/04/24 0645)  Resp: 19 (08/04/24 0645)  BP: (!) 93/58 (08/04/24 0645)  SpO2: 100 % (08/04/24 0645) Vital Signs (24h Range):  Temp:  [96.7 °F (35.9 °C)-98.6 °F (37 °C)] 96.7 °F (35.9 °C)  Pulse:  [] 146  Resp:  [13-31] 19  SpO2:  [80 %-100 %] 100 %  BP: ()/() 93/58     Weight: 128.5 kg (283 lb 4.7 oz) (08/01/24 1129)  Body mass index is 44.37 kg/m².  Body surface area is 2.46 meters squared.    I/O last 3 completed shifts:  In: 5232.9 [I.V.:4347.6; IV Piggyback:885.3]  Out: 5816 [Other:5341; Stool:475]     Physical Exam  Constitutional:       Comments: Lethargic   HENT:      Right Ear: External ear normal.      Left Ear: External ear normal.       Mouth/Throat:      Mouth: Mucous membranes are moist.   Eyes:      Extraocular Movements: Extraocular movements intact.   Cardiovascular:      Rate and Rhythm: Normal rate.      Pulses: Normal pulses.   Pulmonary:      Effort: Pulmonary effort is normal.      Breath sounds: No wheezing, rhonchi or rales.   Abdominal:      General: Abdomen is flat.   Musculoskeletal:      Cervical back: Normal range of motion.      Right lower leg: Edema present.      Left lower leg: Edema present.   Skin:     General: Skin is warm.      Capillary Refill: Capillary refill takes less than 2 seconds.          Significant Labs:  CBC:   Recent Labs   Lab 08/04/24 0352   WBC 21.75*   RBC 2.67*   HGB 7.2*   HCT 22.8*   *   MCV 85   MCH 27.0   MCHC 31.6*     CMP:   Recent Labs   Lab 08/04/24 0352   GLU 99   CALCIUM 7.6*   ALBUMIN 1.5*      K 4.3   CO2 16*      BUN 11   CREATININE 1.7*     All labs within the past 24 hours have been reviewed.     Significant Imaging:    Assessment/Plan:     Renal/  ESRD (end stage renal disease)  ESRD - was briefly on PD then HD, having issues w tunnel cath (removed on 7/31) and now w trialysis in place in [facility]. S/p R nephrectomy RCC  C diff positive - on oral vanc  HTN  DM  Afib w rvr  CHF w ischemic cardiomyopathy  S/p cardiac arrest     Plan/Recommendations    -Will hold RRT today and plan for SLED again tomorrow.   -Continue to monitor intake and output     Anemia in ESRD;  .Hemoglobin goal in ESRD is 10-12g/dl  Epo with HD   .  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  Lab Results   Component Value Date    .7 (H) 05/08/2024    CALCIUM 7.6 (L) 08/04/2024    CAION 1.02 (L) 07/20/2024    PHOS 2.9 08/04/2024    PHOS 2.9 08/04/2024        ID  * Clostridium difficile infection  -management per primary     Septic shock  Management per primary team.     Endocrine  Severe obesity (BMI >= 40)  Per primary team          Thank you for your consult. I will follow-up with patient. Please contact us if you have any additional questions.    Jon Hernandez MD  Nephrology  [facility] - Cardiac Medical ICU

## 2024-08-04 NOTE — SUBJECTIVE & OBJECTIVE
Interval History/Significant Events: HR sustaining in 130-140s range. Digoxin given. Discussion with 2 daughters and team regarding current situation and code status. Family will discuss and get back with us. At this time, no change in code status.    Review of Systems   Unable to perform ROS: Acuity of condition     Objective:     Vital Signs (Most Recent):  Temp: 99 °F (37.2 °C) (08/04/24 0705)  Pulse: (!) 118 (08/04/24 1105)  Resp: (!) 24 (08/04/24 1105)  BP: (!) 140/63 (08/04/24 1105)  SpO2: 95 % (08/04/24 1105) Vital Signs (24h Range):  Temp:  [96.7 °F (35.9 °C)-99 °F (37.2 °C)] 99 °F (37.2 °C)  Pulse:  [] 118  Resp:  [13-28] 24  SpO2:  [80 %-100 %] 95 %  BP: ()/() 140/63   Weight: 128.5 kg (283 lb 4.7 oz)  Body mass index is 44.37 kg/m².      Intake/Output Summary (Last 24 hours) at 8/4/2024 1202  Last data filed at 8/4/2024 0800  Gross per 24 hour   Intake 1271.67 ml   Output 2035 ml   Net -763.33 ml          Physical Exam  Vitals and nursing note reviewed.   Constitutional:       General: She is sleeping.      Appearance: She is morbidly obese. She is ill-appearing.   HENT:      Mouth/Throat:      Mouth: Mucous membranes are dry.      Pharynx: Oropharynx is clear.   Eyes:      Extraocular Movements: Extraocular movements intact.      Pupils: Pupils are equal, round, and reactive to light.   Cardiovascular:      Rate and Rhythm: Regular rhythm. Tachycardia present.   Pulmonary:      Effort: Pulmonary effort is normal.   Abdominal:      Palpations: Abdomen is soft.   Genitourinary:     Comments: Damon and Flexi  Musculoskeletal:         General: Swelling present.      Right lower leg: 3+ Edema present.      Left lower leg: 3+ Edema present.   Skin:     General: Skin is warm and dry.      Capillary Refill: Capillary refill takes less than 2 seconds.   Neurological:      General: No focal deficit present.      Mental Status: She is easily aroused. She is lethargic and disoriented.    Psychiatric:         Behavior: Behavior is cooperative.            Vents:  Vent Mode: Spont (07/16/24 1003)  Set Rate: 15 BPM (07/16/24 0903)  Vt Set: 420 mL (07/16/24 0903)  Pressure Support: 5 cmH20 (07/16/24 1003)  PEEP/CPAP: 5 cmH20 (07/16/24 1003)  Oxygen Concentration (%): 30 (08/02/24 2359)  Peak Airway Pressure: 10 cmH20 (07/16/24 1003)  Plateau Pressure: 0 cmH20 (07/16/24 1003)  Total Ve: 5.45 L/m (07/16/24 1003)  Negative Inspiratory Force (cm H2O): 0 (07/16/24 1003)  Lines/Drains/Airways       Central Venous Catheter Line  Duration             Trialysis (Dialysis) Catheter 08/02/24 0452 left internal jugular 2 days              Drain  Duration                  Rectal Tube 07/28/24 0000 7 days                  Significant Labs:    CBC/Anemia Profile:  Recent Labs   Lab 08/03/24  0327 08/04/24  0352   WBC 23.47* 21.75*   HGB 7.5* 7.2*   HCT 24.8* 22.8*    105*   MCV 84 85   RDW 22.6* 23.6*        Chemistries:  Recent Labs   Lab 08/03/24  1357 08/03/24  2206 08/04/24  0352    138 138   K 4.1 4.1 4.3    106 107   CO2 17* 17* 16*   BUN 18 14 11   CREATININE 2.6* 2.1* 1.7*   CALCIUM 8.3* 7.8* 7.6*   ALBUMIN 1.6* 1.5* 1.5*   MG 2.1 1.8 2.1   PHOS 3.3 2.7 2.9  2.9       All pertinent labs within the past 24 hours have been reviewed.    Significant Imaging:  I have reviewed all pertinent imaging results/findings within the past 24 hours.

## 2024-08-04 NOTE — ASSESSMENT & PLAN NOTE
ESRD - was briefly on PD then HD, having issues w tunnel cath (removed on 7/31) and now w trialysis in place in [facility]. S/p R nephrectomy RCC  C diff positive - on oral vanc  HTN  DM  Afib w rvr  CHF w ischemic cardiomyopathy  S/p cardiac arrest     Plan/Recommendations    -Will hold RRT today and plan for SLED again tomorrow.   -Continue to monitor intake and output     Anemia in ESRD;  .Hemoglobin goal in ESRD is 10-12g/dl  Epo with HD   .  Lab Results   Component Value Date    WBC 21.75 (H) 08/04/2024    HGB 7.2 (L) 08/04/2024    HCT 22.8 (L) 08/04/2024    MCV 85 08/04/2024     (L) 08/04/2024     Lab Results   Component Value Date    IRON 17 (L) 07/25/2024    TRANSFERRIN 117 (L) 07/25/2024    TRANSFERRIN 117 (L) 07/25/2024    TIBC 173 (L) 07/25/2024    FESATURATED 10 (L) 07/25/2024      Lab Results   Component Value Date    FERRITIN 156 07/25/2024        Bone Mineral Disorder in ESRD;  .F/U PTH, PO4, Vit D  Discontinue phos binders while on SLED  Renal diet   .  Lab Results   Component Value Date    .7 (H) 05/08/2024    CALCIUM 7.6 (L) 08/04/2024    CAION 1.02 (L) 07/20/2024    PHOS 2.9 08/04/2024    PHOS 2.9 08/04/2024

## 2024-08-04 NOTE — ASSESSMENT & PLAN NOTE
Concern for left femoral line infection.      - Tentatively plan to remove left femoral line  - Continue Zosyn   - Levophed and vasopressin MAP > 65  - Repeat blood cultures NGTD

## 2024-08-04 NOTE — ACP (ADVANCE CARE PLANNING)
Advance Care Planning     Date: 08/04/2024    Code Status  In light of the patients advanced and life limiting illness,I engaged the the family in a voluntary conversation about the patient's preferences for care  at the very end of life. The patient wishes to have a natural, peaceful death.  Along those lines, the patient does not wish to have CPR or other invasive treatments performed when her heart and/or breathing stops. I communicated to the family that a DNR order would be placed in her medical record to reflect this preference.    A total of 30 min was spent on advance care planning, goals of care discussion, emotional support, formulating and communicating prognosis and exploring burden/benefit of various approaches of treatment. This discussion occurred on a fully voluntary basis with the verbal consent of the patient and/or family.         Connie Ashby, DO  Internal Medicine PGY 2

## 2024-08-04 NOTE — SUBJECTIVE & OBJECTIVE
Interval History: Patient seen this morning  and remains on Levo and Vaso for blood pressure support. Patient completed SLED early this morning. Labs after SLED was completed were acceptable.     Review of patient's allergies indicates:   Allergen Reactions    Percocet [oxycodone-acetaminophen] Itching    Januvia [sitagliptin]     Jardiance [empagliflozin]      Leg cramps    Lipitor [atorvastatin] Other (See Comments)     Severe leg pain    Linaclotide Other (See Comments) and Nausea And Vomiting     Does not remember    Lubiprostone Other (See Comments) and Palpitations     Does not remember     Current Facility-Administered Medications   Medication Frequency    0.9%  NaCl infusion (CRRT USE ONLY) Continuous    acetaminophen tablet 1,000 mg Q8H PRN    alteplase injection 4 mg Once    cholestyramine 4 gram packet 4 g BID    dextromethorphan-guaiFENesin  mg/5 ml liquid 5 mL Q4H PRN    dextrose 10% bolus 125 mL 125 mL PRN    dextrose 10% bolus 250 mL 250 mL PRN    diphenhydrAMINE capsule 25 mg Q6H PRN    duke's soln (benadryl 30 mL, mylanta 30 mL, LIDOcaine 30 mL, nystatin 30 mL) 120 mL QID    epoetin alaina injection 6,430 Units Every Mon, Wed, Fri    fidaxomicin tablet 200 mg BID    glucagon (human recombinant) injection 1 mg PRN    glucose chewable tablet 16 g PRN    glucose chewable tablet 24 g PRN    heparin (porcine) injection 1,000 Units PRN    heparin 25,000 units in dextrose 5% (100 units/ml) IV bolus from bag LOW INTENSITY nomogram - [facility] PRN    heparin 25,000 units in dextrose 5% (100 units/ml) IV bolus from bag LOW INTENSITY nomogram - [facility] PRN    heparin 25,000 units in dextrose 5% 250 mL (100 units/mL) infusion LOW INTENSITY nomogram - [facility] Continuous    HYDROcodone-acetaminophen 5-325 mg per tablet 1 tablet Q6H PRN    HYDROmorphone injection 0.5 mg Q2H PRN    hyoscyamine SL tablet 0.125 mg Q4H PRN    insulin aspart U-100 pen 0-10 Units QID (AC + HS) PRN    LIDOcaine 5 % patch 1 patch Q24H    LIDOcaine  5 % patch 2 patch Q24H    magnesium sulfate 2g in water 50mL IVPB (premix) PRN    metoprolol injection 5 mg Q6H PRN    miconazole NITRATE 2 % top powder BID    midodrine tablet 10 mg Daily PRN    naloxone 0.4 mg/mL injection 0.02 mg PRN    NORepinephrine 32 mg in D5W 250 mL infusion Continuous    ondansetron injection 4 mg Q6H PRN    piperacillin-tazobactam (ZOSYN) 4.5 g in D5W 100 mL IVPB (MB+) Q8H    simethicone chewable tablet 80 mg TID PRN    sodium chloride 0.9% bolus 250 mL 250 mL PRN    sodium chloride 0.9% flush 10 mL PRN    sodium chloride 0.9% flush 10 mL PRN    sodium phosphate 20.01 mmol in D5W 250 mL IVPB PRN    sodium phosphate 30 mmol in D5W 250 mL IVPB PRN    sodium phosphate 39.99 mmol in D5W 250 mL IVPB PRN    vancomycin - pharmacy to dose pharmacy to manage frequency    vasopressin (PITRESSIN) 0.2 Units/mL in D5W 100 mL infusion Continuous       Objective:     Vital Signs (Most Recent):  Temp: 96.7 °F (35.9 °C) (08/03/24 2315)  Pulse: (!) 146 (08/04/24 0645)  Resp: 19 (08/04/24 0645)  BP: (!) 93/58 (08/04/24 0645)  SpO2: 100 % (08/04/24 0645) Vital Signs (24h Range):  Temp:  [96.7 °F (35.9 °C)-98.6 °F (37 °C)] 96.7 °F (35.9 °C)  Pulse:  [] 146  Resp:  [13-31] 19  SpO2:  [80 %-100 %] 100 %  BP: ()/() 93/58     Weight: 128.5 kg (283 lb 4.7 oz) (08/01/24 1129)  Body mass index is 44.37 kg/m².  Body surface area is 2.46 meters squared.    I/O last 3 completed shifts:  In: 5232.9 [I.V.:4347.6; IV Piggyback:885.3]  Out: 5816 [Other:5341; Stool:475]     Physical Exam  Constitutional:       Comments: Lethargic   HENT:      Right Ear: External ear normal.      Left Ear: External ear normal.      Mouth/Throat:      Mouth: Mucous membranes are moist.   Eyes:      Extraocular Movements: Extraocular movements intact.   Cardiovascular:      Rate and Rhythm: Normal rate.      Pulses: Normal pulses.   Pulmonary:      Effort: Pulmonary effort is normal.      Breath sounds: No wheezing, rhonchi or  rales.   Abdominal:      General: Abdomen is flat.   Musculoskeletal:      Cervical back: Normal range of motion.      Right lower leg: Edema present.      Left lower leg: Edema present.   Skin:     General: Skin is warm.      Capillary Refill: Capillary refill takes less than 2 seconds.          Significant Labs:  CBC:   Recent Labs   Lab 08/04/24 0352   WBC 21.75*   RBC 2.67*   HGB 7.2*   HCT 22.8*   *   MCV 85   MCH 27.0   MCHC 31.6*     CMP:   Recent Labs   Lab 08/04/24 0352   GLU 99   CALCIUM 7.6*   ALBUMIN 1.5*      K 4.3   CO2 16*      BUN 11   CREATININE 1.7*     All labs within the past 24 hours have been reviewed.     Significant Imaging:

## 2024-08-05 PROBLEM — N18.6 ANEMIA IN ESRD (END-STAGE RENAL DISEASE): Status: ACTIVE | Noted: 2024-01-01

## 2024-08-05 PROBLEM — D63.1 ANEMIA IN ESRD (END-STAGE RENAL DISEASE): Status: ACTIVE | Noted: 2024-01-01

## 2024-08-05 NOTE — PROGRESS NOTES
08/05/24 1427   Treatment   Treatment Type SLED   Treatment Status New start   Dialysis Machine Number [id number]   Solutions Labeled and Current  Yes   Access Right;Temporary Cath;Femoral   Catheter Dressing Intact  Yes   Alarms Engaged Yes   CRRT Comments SLED X 10 HRS started   Prescription   Time (Hours) 10   Dialysate K + (mEq/L) 4   Dialysate CA + (mEq/L) 2.25   Dialysate HCO3 - (Bicarb) (mEq/L) Other (Comment)  (32)   Dialysate Na + (mEq/L) 138   Cartridge Type Other  (r300)   Dialysate Flow Rate (mL/min) 200   UF Goal Rate 500 mL/hr   CRRT Hourly Documentation   Blood Flow (mL/min) 150   UF Rate 200 cc/hr   Arterial Pressure (mmHg) -30 mmHg   Venous Pressure (mmHg) 30 mmHg   Effluent Pressure (EP) (mmHg) 20 mmHg   Total UF (Hourly Cleared) (mL) 0     SLED X 10 HRS. Report given to primary nurse at BS.

## 2024-08-05 NOTE — ASSESSMENT & PLAN NOTE
Concern for left femoral line infection, line removed 8/2. Left leg wound culture 7/31 + pseudomonas. C diff infection diagnosed 7/22.     - 2 week course of Fidaxomicin for C Diff, end date 8/11  - Continue Zosyn (Day 4)   - Repeat Blood cultures: NGTD  - Levophed and vasopressin; goal MAP > 65

## 2024-08-05 NOTE — ASSESSMENT & PLAN NOTE
LE venous doppler identified nonocclusive thrombus within the left mid and distal femoral vein.    - Continue heparin gtt  - Monitor PTT

## 2024-08-05 NOTE — ASSESSMENT & PLAN NOTE
History of combined systolic and diastolic HF.   ECHO:    Left Ventricle: The left ventricle is moderately dilated. Normal wall thickness. Global hypokinesis present. There is severely reduced systolic function with a visually estimated ejection fraction of 20 - 25%.    Right Ventricle: Mild right ventricular enlargement. Wall thickness is normal. Right ventricle wall motion has global hypokinesis. Systolic function is moderately reduced.    Left Atrium: Left atrium is severely dilated.    Right Atrium: Right atrium is severely dilated.    Aortic Valve: The aortic valve is a trileaflet valve. There is mild aortic valve sclerosis.    Mitral Valve: There is mild regurgitation.    Tricuspid Valve: There is severe regurgitation.    Pulmonary Artery: There is moderate pulmonary hypertension. The estimated pulmonary artery systolic pressure is 61 mmHg.    IVC/SVC: Elevated venous pressure at 15 mmHg.    Pericardium: There is a trivial circumferential effusion. No indication of cardiac tamponade.    - Will require interventional cardiology for LHC vs PET for reduced EF once euvolemic  - LifeVest prior to discharge  - Entresto recommended by cardiology, but currently on hold due to sepsis  - Strict I/Os  - ECHO 8/5 without new findings

## 2024-08-05 NOTE — ASSESSMENT & PLAN NOTE
S/p R nephrectomy RCC   Vascular access has been a challenge. PD catheter removed 07/15. Tunneled dialysis catheter was not functioning, removed 8/1 by nephrology. Trialysis catheter placed in left fem on 7/20. Removed 8/2 for concern of infection.     - Trialysis L IJ placed 8/2, accidentally removed 8/5  - Unable to place trialysis in BL IJ due to blood clots  - L femoral trialysis placed 8/5  - SLED 8/5  - Nephrology following:   - Do not recommend TPN at this time.   - Will use heparin locks for dialysis catheters.   - Patient should get epo regardless of dialysis sessions.

## 2024-08-05 NOTE — PROGRESS NOTES
[facility]  Nephrology  Progress Note    Patient Name: Juhi Taylor  MRN: [id number]  Admission Date: 7/3/2024  Hospital Length of Stay: 33 days  Attending Provider: Ruben Neri MD   Primary Care Physician: Tony Sadler MD  Principal Problem:Septic shock    Subjective:     HPI: 74-year-old female with prior history of CKD stage 5 with recent PD catheter placement on 6/4, RCC s/p right nephrectomy, T2DM, obesity, CAD s/p PCI to Lcx and LAD in 11/2020 and HFmREF who is admitted as a transfer from [facility] for higher level of care and further cardiac evaluation. Nephrology consulted for hemodialysis.     She initially presented on 6/18/24 to [facility] for hypervolemia in the setting of CKD 4-5 for which she had undergone elective PD catheter placement complicated by catheter site leaking. She had a complex hospital course, was initially placed on furosemide gtt without significant improvement and later underwent placement of tunneled line for HD with volume removal. During her second dialysis session she went into PEA arrest for which she was resuscitated, intubated and transferred to ICU. She was noted to have elevated troponin and subsequent TTE found reduced EF from 40-45% to 20-25% for which coronary angiography was recommended. Her hospital course was also complicated by dialysis line infection, significant bleeding around the catheter site, bradycardia and subsequent atrial fibrillation with RVR. Given the catheter site bleeding, anticoagulation was temporarily held. She has reported allergy to amiodarone, was initially placed on diltiazem for atrial fibrillation despite reduced EF. She discussed GOC at outside hospital where it appears that she lacked capacity per note and decided to pursue higher level of care at [facility]. At that time, gen surgery was planning to place a left tunneled catheter due to concerns of the right side not being suitable    Upon admit, patient appeared  drowsy but conversant. Daughter reports that she typically gets lethargic 2/2 to the volume overload due to not receiving dialysis. Patient reported b/l left lower extremity pain. Daughter concerned about her not having dialysis in 4 days and wants a session today.     No new subjective & objective note has been filed under this hospital service since the last note was generated.    Assessment/Plan:     Oncology  Anemia in ESRD (end-stage renal disease)  - On epo injection   - Transfuse blood when indicated         Thank you for your consult. I will follow-up with patient. Please contact us if you have any additional questions.    Jason King MD  Nephrology  [facility]

## 2024-08-05 NOTE — PROGRESS NOTES
[facility]  Nephrology  Progress Note    Patient Name: Juhi Taylor  MRN: [id number]  Admission Date: 7/3/2024  Hospital Length of Stay: 33 days  Attending Provider: Ruben Neri MD   Primary Care Physician: Tony Sadler MD  Principal Problem:Septic shock    Subjective:     HPI: 74-year-old female with prior history of CKD stage 5 with recent PD catheter placement on 6/4, RCC s/p right nephrectomy, T2DM, obesity, CAD s/p PCI to Lcx and LAD in 11/2020 and HFmREF who is admitted as a transfer from [facility] for higher level of care and further cardiac evaluation. Nephrology consulted for hemodialysis.     She initially presented on 6/18/24 to [facility] for hypervolemia in the setting of CKD 4-5 for which she had undergone elective PD catheter placement complicated by catheter site leaking. She had a complex hospital course, was initially placed on furosemide gtt without significant improvement and later underwent placement of tunneled line for HD with volume removal. During her second dialysis session she went into PEA arrest for which she was resuscitated, intubated and transferred to ICU. She was noted to have elevated troponin and subsequent TTE found reduced EF from 40-45% to 20-25% for which coronary angiography was recommended. Her hospital course was also complicated by dialysis line infection, significant bleeding around the catheter site, bradycardia and subsequent atrial fibrillation with RVR. Given the catheter site bleeding, anticoagulation was temporarily held. She has reported allergy to amiodarone, was initially placed on diltiazem for atrial fibrillation despite reduced EF. She discussed GOC at outside hospital where it appears that she lacked capacity per note and decided to pursue higher level of care at [facility]. At that time, gen surgery was planning to place a left tunneled catheter due to concerns of the right side not being suitable    Upon admit, patient appeared  drowsy but conversant. Daughter reports that she typically gets lethargic 2/2 to the volume overload due to not receiving dialysis. Patient reported b/l left lower extremity pain. Daughter concerned about her not having dialysis in 4 days and wants a session today.     Interval History:      Patient had received new access today (femoral line), sedated on pressors, blood pressure 93/50 still in contact precaution, , for Clostridium difficile, sodium and potassium are within normal limits, 138 and 4 respectively     Still hypotensive, 93/58 mmHg, serum creatinine 2.5, BUN 17, net I/O last 8 hours 293 mL, >>>family questions and concerns answered at bedside     Review of patient's allergies indicates:   Allergen Reactions    Percocet [oxycodone-acetaminophen] Itching    Januvia [sitagliptin]     Jardiance [empagliflozin]      Leg cramps    Lipitor [atorvastatin] Other (See Comments)     Severe leg pain    Linaclotide Other (See Comments) and Nausea And Vomiting     Does not remember    Lubiprostone Other (See Comments) and Palpitations     Does not remember     Current Facility-Administered Medications   Medication Frequency    0.9%  NaCl infusion (CRRT USE ONLY) Continuous    0.9%  NaCl infusion (for blood administration) Q24H PRN    acetaminophen tablet 1,000 mg Q8H PRN    albumin human 25% bottle 50 g Once    cholestyramine 4 gram packet 4 g BID    dexmedeTOMIDine in 0.9 % NaCL 400 mcg/100 mL (4 mcg/mL) infusion     dextromethorphan-guaiFENesin  mg/5 ml liquid 5 mL Q4H PRN    dextrose 10% bolus 125 mL 125 mL PRN    dextrose 10% bolus 250 mL 250 mL PRN    diphenhydrAMINE injection 12.5 mg Q6H PRN    duke's soln (benadryl 30 mL, mylanta 30 mL, LIDOcaine 30 mL, nystatin 30 mL) 120 mL QID    epoetin alaina injection 6,400 Units Every Mon, Wed, Fri    fidaxomicin tablet 200 mg BID    glucagon (human recombinant) injection 1 mg PRN    glucose chewable tablet 16 g PRN    glucose chewable tablet 24 g PRN    heparin  (porcine) injection 1,000 Units PRN    heparin 25,000 units in dextrose 5% (100 units/ml) IV bolus from bag HIGH INTENSITY nomogram - [facility] PRN    heparin 25,000 units in dextrose 5% (100 units/ml) IV bolus from bag HIGH INTENSITY nomogram - [facility] PRN    heparin 25,000 units in dextrose 5% 250 mL (100 units/mL) infusion HIGH INTENSITY nomogram - [facility] Continuous    HYDROcodone-acetaminophen 5-325 mg per tablet 1 tablet Q6H PRN    HYDROmorphone injection 0.5 mg Q2H PRN    hyoscyamine SL tablet 0.125 mg Q4H PRN    insulin aspart U-100 pen 0-10 Units QID (AC + HS) PRN    LIDOcaine 5 % patch 1 patch Q24H    LIDOcaine 5 % patch 2 patch Q24H    magnesium sulfate 2g in water 50mL IVPB (premix) PRN    metoprolol injection 5 mg Q6H PRN    miconazole NITRATE 2 % top powder BID    midodrine tablet 10 mg Daily PRN    naloxone 0.4 mg/mL injection 0.02 mg PRN    NORepinephrine 32 mg in D5W 250 mL infusion Continuous    ondansetron injection 4 mg Q6H PRN    piperacillin-tazobactam (ZOSYN) 4.5 g in D5W 100 mL IVPB (MB+) Q8H    sevelamer carbonate pwpk 0.8 g TID WM    simethicone chewable tablet 80 mg TID PRN    sodium chloride 0.9% bolus 250 mL 250 mL PRN    sodium chloride 0.9% flush 10 mL PRN    sodium chloride 0.9% flush 10 mL PRN    sodium phosphate 20.01 mmol in D5W 250 mL IVPB PRN    sodium phosphate 30 mmol in D5W 250 mL IVPB PRN    sodium phosphate 39.99 mmol in D5W 250 mL IVPB PRN    vasopressin (PITRESSIN) 0.2 Units/mL in D5W 100 mL infusion Continuous       Objective:     Vital Signs (Most Recent):  Temp: 98.2 °F (36.8 °C) (08/05/24 1346)  Pulse: (!) 114 (08/05/24 1346)  Resp: 15 (08/05/24 1346)  BP: (!) 95/51 (08/05/24 1346)  SpO2: 100 % (08/05/24 1346) Vital Signs (24h Range):  Temp:  [97 °F (36.1 °C)-98.5 °F (36.9 °C)] 98.2 °F (36.8 °C)  Pulse:  [] 114  Resp:  [10-75] 15  SpO2:  [80 %-100 %] 100 %  BP: ()/(33-89) 95/51     Weight: 128.5 kg (283 lb 4.7 oz) (08/05/24 1332)  Body mass index is 44.37 kg/m².  Body  "surface area is 2.46 meters squared.    I/O last 3 completed shifts:  In: 2064.9 [P.O.:120; I.V.:1296.3; IV Piggyback:648.7]  Out: 1960 [Other:1760; Stool:200]     Physical Exam  Constitutional:       Appearance: She is ill-appearing.   HENT:      Head: Normocephalic and atraumatic.   Cardiovascular:      Rate and Rhythm: Tachycardia present.   Pulmonary:      Effort: No respiratory distress.   Abdominal:      General: There is distension.   Musculoskeletal:      Right lower leg: Edema present.      Left lower leg: Edema present.   Neurological:      Comments: Sedated on pressors,          Significant Labs:  ABGs: No results for input(s): "PH", "PCO2", "HCO3", "POCSATURATED", "BE" in the last 168 hours.  CMP:   Recent Labs   Lab 08/05/24  0507     103   CALCIUM 8.3*  8.3*   ALBUMIN 1.6*  1.6*   PROT 4.6*     138   K 4.0  4.0   CO2 17*  17*     105   BUN 17  17   CREATININE 2.5*  2.5*   ALKPHOS 154*   ALT 11   AST 32   BILITOT 2.3*     LFTs:   Recent Labs   Lab 08/05/24  0507   ALT 11   AST 32   ALKPHOS 154*   BILITOT 2.3*   PROT 4.6*   ALBUMIN 1.6*  1.6*     No results for input(s): "COLORU", "CLARITYU", "SPECGRAV", "PHUR", "PROTEINUA", "GLUCOSEU", "BILIRUBINCON", "BLOODU", "WBCU", "RBCU", "BACTERIA", "MUCUS", "NITRITE", "LEUKOCYTESUR", "UROBILINOGEN", "HYALINECASTS" in the last 168 hours.  All labs within the past 24 hours have been reviewed.     Significant Imaging:  Labs reviewed   Assessment/Plan:     Cardiac/Vascular  Acute on chronic combined systolic and diastolic heart failure  Per primary team    Renal/  ESRD (end stage renal disease)  - ESRD - was briefly on PD then HD, having issues w tunnel cath (removed on 7/31) and now her access is fermoral region.   - S/p R nephrectomy RCC  - C diff positive - on fidaxomicin  HTN  DM  Afib w rvr  CHF w ischemic cardiomyopathy  S/p cardiac arrest     Plan/Recommendations    - Patient is on on Levophed, Precedex, plan for SLED for today "   -Continue to monitor intake and output  -      Anemia in ESRD;  - Hemoglobin goal in ESRD is 10-12g/dl  - Epo with HD   .  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  Lab Results   Component Value Date    .7 (H) 05/08/2024    CALCIUM 8.3 (L) 08/05/2024    CALCIUM 8.3 (L) 08/05/2024    CAION 1.02 (L) 07/20/2024    PHOS 5.0 (H) 08/05/2024    PHOS 5.0 (H) 08/05/2024        ID  Clostridium difficile infection  -management per primary   - on fidaxomicin tab     Oncology  Anemia in ESRD (end-stage renal disease)  - On epo injection         Thank you for your consult. I will follow-up with patient. Please contact us if you have any additional questions.    Jason King MD  Nephrology  [facility] - Cardiac Medical ICU

## 2024-08-05 NOTE — ASSESSMENT & PLAN NOTE
Diagnosed 7/22/24. Will complete 2 week course of Fidaxomicin 8/11.     - Flexi in place; strict I/Os  - Increased output may be contributing to hypotensive episode

## 2024-08-05 NOTE — PROGRESS NOTES
[facility] - Cardiac Medical ICU  Critical Care Medicine  Progress Note    Patient Name: Juhi Taylor  MRN: [id number]  Admission Date: 7/3/2024  Hospital Length of Stay: 33 days  Code Status: DNR  Attending Provider: Ruben Neri MD  Primary Care Provider: Tony Sadler MD   Principal Problem: Septic shock    Subjective:     HPI:  Juhi Taylor is a 74-year-old female with a past medical history of CKD stage 5 with recent PD catheter placement on 6/4, RCC s/p right nephrectomy, T2DM, obesity, CAD s/p PCI to Lcx and LAD in 11/2020 and HFmREF who is admitted as a transfer from [facility] for higher level of care and further cardiac evaluation. Nephrology consulted for hemodialysis.     She initially presented on 6/18/24 to [facility] for hypervolemia in the setting of CKD 4-5 for which she had undergone elective PD catheter placement complicated by catheter site leaking. She had a complex hospital course, was initially placed on furosemide gtt without significant improvement and later underwent placement of tunneled line for HD with volume removal. During her second dialysis session she went into PEA arrest for which she was resuscitated, intubated and transferred to ICU. She was noted to have elevated troponin and subsequent TTE found reduced EF from 40-45% to 20-25% for which coronary angiography was recommended. Her hospital course was also complicated by dialysis line infection, significant bleeding around the catheter site, bradycardia and subsequent atrial fibrillation with RVR. Given the catheter site bleeding, anticoagulation was temporarily held. She has reported allergy to amiodarone, was initially placed on diltiazem for atrial fibrillation despite reduced EF. She discussed GOC at outside hospital where it appears that she lacked capacity per note and decided to pursue higher level of [place] [facility]. At that time, General Surgery was planning to place a left tunneled catheter due to concerns of the  right side not being suitable     Upon admit, patient appeared drowsy but conversant. Daughter reports that she typically gets lethargic 2/2 to the volume overload due to not receiving dialysis. Patient reported b/l left lower extremity pain. Critical Care Medicine was consulted for emergent trialysis line placement and dialysis.     Ms. Taylor was admitted to MICU on 7/8/24 for urgent dialysis line placement and initiation of dialysis. Patient received short run of CRRT overnight without issue. Encephalopathy improving with continued CRRT treatments. She has had persistent AFib with RVR for which received Digoxin loading dose (renally dosed) and was restarted PO carvedilol. Patient remains in persistent AFib RVR despite therapeutic digoxin level and uptitration of carvedilol dosages so she was started on an amiodarone infusion. She was then transitioned to amio PO and started on heparin infusion for atrial fibrillation. General Surgery removed her peritoneal dialysis catheter on 7/15/24, but upon induction she vomited copious amounts of bile and was endotracheally intubated. She was extubated to nasal cannula on 7/16/24.     She has had difficulties with her tunneled HD line with sluggish flow especially through the venous side.  She would have a 4-hour instillation of tPA in each line before being able to run iHD, and even then it would be stopped before completion.  Interventional Nephrology plans to replace the line at some point next week.  Of note, Palliative Care was also consulted and patient had made her wishes known that she is a DNR code status.  She was still vacillating between replacing her tunneled HD line given her adverse event during her last procedure or going home with hospice.  The family was upset with her decision and April, her niece, asked that Palliative Care not return to see the patient.      Hospital/ICU Course:  Ms. Taylor was admitted to MICU on 7/8 for urgent dialysis line  placement and initiated of HD. Patient received short run of CRRT overnight without issue. Encephalopathy improving with continued CRRT treatments. Persistent AFib with RVR, received Digoxin loading dose (renally dosed), restarted PO Carvedilol. Patient remains in persistent AFib RVR despite therapeutic digoxin level and uptitration of Carvedilol levels, started on Amiodarone infusion overnight. Transitioned to amio PO, started on heparin gtt for afib AC. Tolerated HD well overnight without complications and less encephalopathic upon exam. Planning for gen surg to remove PD catheter. Upon induction pt vomited copious amounts of bile, was intubated. PD cathter removed successfully. Extubated to NC 7/16.     7/18:  Patient made DNR after Palliative Care discussions yesterday.  Tolerated SLED 12 hours overnight.  Discussed difficulties regarding HD line with Interventional Nephrology and plans to replace it next week if patient it amenable.  Patient was stepped down from ICU.  7/19:  Stepped back up to medical ICU with plans for overnight SLED x 3 days.    On 7/19 Nephrology requested that the patient be stepped back up to the medical ICU for SLED. A temporary trialysis HD catheter was placed on 7/20/24 in the groin. She also began having diarrhea, which tested positive for Clostridium difficile so she was started on PO vancomycin 7/22/24. HD trial on 7/24; remained HDS. Ready for stepdown.    Stepped down to  on 7/26/24. On 8/2/2024, [facility] re-consulted for hypotension (70s/40s) and increased somnolence. Nephrology following for dialysis needs. Placed on vancomycin and zosyn due to concern for line infection, removed left femoral line. LIJ trialysis placed. On 8/3 she developed tachycardia and soft pressures with signs of distress. Code cart was rolled into the room. AED pads were placed but never used. Tachycardia improved but she required the addition of a second pressor. Digoxin added PRN for tachycardia. After  family discussion 8/5, patient made DNR. Patient accidentally removed left IJ trialysis 8/5, line could not be replaced in IJ due to blood clots. Line placed in left femoral vein.         Interval History/Significant Events: In AM, patient went to itch neck and accidentally dislodged her L IJ trialysis line. Unable to replace line in IJ due to clots in vessel bilaterally. Trialysis successfully placed in left femoral vein. Patient with continued encephalopathy. In sustained A fib with RVR, received 1 dose of digoxin overnight. Day 4 of Zosyn. Qtc 524. Patient not consuming much of meals, family will continue to encourage increased intake. Family not currently interested in NG tube. On HD. Transfused 1u pRBC.     Review of Systems   Reason unable to perform ROS: Patient obtunded.     Objective:     Vital Signs (Most Recent):  Temp: 97.3 °F (36.3 °C) (08/05/24 1101)  Pulse: 99 (08/05/24 1301)  Resp: 16 (08/05/24 1301)  BP: (!) 119/58 (08/05/24 1301)  SpO2: 100 % (08/05/24 1301) Vital Signs (24h Range):  Temp:  [97 °F (36.1 °C)-98.7 °F (37.1 °C)] 97.3 °F (36.3 °C)  Pulse:  [] 99  Resp:  [10-75] 16  SpO2:  [80 %-100 %] 100 %  BP: ()/(33-89) 119/58   Weight: 128.5 kg (283 lb 4.7 oz)  Body mass index is 44.37 kg/m².      Intake/Output Summary (Last 24 hours) at 8/5/2024 1345  Last data filed at 8/5/2024 1301  Gross per 24 hour   Intake 695.22 ml   Output 200 ml   Net 495.22 ml          Physical Exam  Constitutional:       Appearance: She is obese. She is ill-appearing.   HENT:      Head: Normocephalic and atraumatic.      Nose: Nose normal.      Mouth/Throat:      Mouth: Mucous membranes are moist.   Eyes:      Conjunctiva/sclera: Conjunctivae normal.   Cardiovascular:      Rate and Rhythm: Tachycardia present. Rhythm irregular.      Heart sounds: Normal heart sounds. No murmur heard.  Pulmonary:      Effort: Pulmonary effort is normal. No respiratory distress.      Breath sounds: Normal breath sounds. No  wheezing.      Comments: 4 L O2 NC  Musculoskeletal:         General: Tenderness and signs of injury (Multiple blisters present) present.      Right lower leg: Edema present.      Left lower leg: Edema present.   Skin:     General: Skin is warm and dry.   Neurological:      Mental Status: She is disoriented.            Vents:  Vent Mode: Spont (07/16/24 1003)  Set Rate: 15 BPM (07/16/24 0903)  Vt Set: 420 mL (07/16/24 0903)  Pressure Support: 5 cmH20 (07/16/24 1003)  PEEP/CPAP: 5 cmH20 (07/16/24 1003)  Oxygen Concentration (%): 30 (08/02/24 2359)  Peak Airway Pressure: 10 cmH20 (07/16/24 1003)  Plateau Pressure: 0 cmH20 (07/16/24 1003)  Total Ve: 5.45 L/m (07/16/24 1003)  Negative Inspiratory Force (cm H2O): 0 (07/16/24 1003)  Lines/Drains/Airways       Central Venous Catheter Line  Duration             Trialysis (Dialysis) Catheter 08/05/24 0925 right femoral <1 day              Drain  Duration                  Rectal Tube 07/28/24 0000 8 days              Peripheral Intravenous Line  Duration                  Peripheral IV - Single Lumen 08/05/24 0730 18 G Right Other <1 day                  Significant Labs:    CBC/Anemia Profile:  Recent Labs   Lab 08/04/24  0352 08/04/24  1613 08/05/24  0507   WBC 21.75* 26.94* 22.82*   HGB 7.2* 7.4* 7.0*   HCT 22.8* 25.2* 23.5*   * 96* 83*   MCV 85 88 87   RDW 23.6* 23.8* 23.9*        Chemistries:  Recent Labs   Lab 08/04/24  0352 08/04/24  1333 08/04/24  2144 08/05/24  0507    138 138 138  138   K 4.3 4.2 4.1 4.0  4.0    106 105 105  105   CO2 16* 18* 16* 17*  17*   BUN 11 15 15 17  17   CREATININE 1.7* 2.3* 2.4* 2.5*  2.5*   CALCIUM 7.6* 8.7 8.7 8.3*  8.3*   ALBUMIN 1.5* 1.5* 1.6* 1.6*  1.6*   PROT  --   --   --  4.6*   BILITOT  --   --   --  2.3*   ALKPHOS  --   --   --  154*   ALT  --   --   --  11   AST  --   --   --  32   MG 2.1  --  2.3 2.1   PHOS 2.9  2.9 5.0* 5.0* 5.0*  5.0*       All pertinent labs within the past 24 hours have been  "reviewed.    Significant Imaging:  I have reviewed all pertinent imaging results/findings within the past 24 hours.  ECHO 8/5: EF 20-25%    ABG  No results for input(s): "PH", "PO2", "PCO2", "HCO3", "BE" in the last 168 hours.  Assessment/Plan:     Neuro  Acute encephalopathy  Likely multifactorial in the setting of septic shock and uremia    - Continue to monitor      Cardiac/Vascular  Atrial fibrillation with RVR  AFib with RVR appears new following cardiac arrest at OSH (7/3?) Per chart review, AFib was difficult to control. Patient endorses amiodarone reaction with itching, rash, and oral burning. Refused Sotalol, DCCV, and AICD placement at OSH. Was placed on Diltizem infusion and transitioned to PO metoprolol. Amio gtt restarted overnight 7/11 s/t uncontrolled afib with RVR. Transitioned to PO amio. However, patient's rate remained elevated despite amiodarone. Appeared to respond better to metoprolol.     - Holding lopressor in setting of septic shock  - Holding Apixaban, anticoagulation with a heparin infusion  - Digoxin 250 mg x1 dose to assist in lowering tachycardia    Acute on chronic combined systolic and diastolic heart failure  History of combined systolic and diastolic HF.   ECHO:    Left Ventricle: The left ventricle is moderately dilated. Normal wall thickness. Global hypokinesis present. There is severely reduced systolic function with a visually estimated ejection fraction of 20 - 25%.    Right Ventricle: Mild right ventricular enlargement. Wall thickness is normal. Right ventricle wall motion has global hypokinesis. Systolic function is moderately reduced.    Left Atrium: Left atrium is severely dilated.    Right Atrium: Right atrium is severely dilated.    Aortic Valve: The aortic valve is a trileaflet valve. There is mild aortic valve sclerosis.    Mitral Valve: There is mild regurgitation.    Tricuspid Valve: There is severe regurgitation.    Pulmonary Artery: There is moderate pulmonary " hypertension. The estimated pulmonary artery systolic pressure is 61 mmHg.    IVC/SVC: Elevated venous pressure at 15 mmHg.    Pericardium: There is a trivial circumferential effusion. No indication of cardiac tamponade.    - Will require interventional cardiology for LHC vs PET for reduced EF once euvolemic  - LifeVest prior to discharge  - Entresto recommended by cardiology, but currently on hold due to sepsis  - Strict I/Os  - ECHO 8/5 without new findings     Essential hypertension  Takes carvedilol, clonidine at home.     - Hold antihypertensives in setting of hypotension.       Renal/  ESRD (end stage renal disease)  S/p R nephrectomy RCC   Vascular access has been a challenge. PD catheter removed 07/15. Tunneled dialysis catheter was not functioning, removed 8/1 by nephrology. Trialysis catheter placed in left fem on 7/20. Removed 8/2 for concern of infection.     - Trialysis L IJ placed 8/2, accidentally removed 8/5  - Unable to place trialysis in BL IJ due to blood clots  - L femoral trialysis placed 8/5  - SLED 8/5  - Nephrology following:   - Do not recommend TPN at this time.   - Will use heparin locks for dialysis catheters.   - Patient should get epo regardless of dialysis sessions.     ID  * Septic shock  Concern for left femoral line infection, line removed 8/2. Left leg wound culture 7/31 + pseudomonas. C diff infection diagnosed 7/22.     - 2 week course of Fidaxomicin for C Diff, end date 8/11  - Continue Zosyn (Day 4)   - Repeat Blood cultures: NGTD  - Levophed and vasopressin; goal MAP > 65      Clostridium difficile infection  Diagnosed 7/22/24. Will complete 2 week course of Fidaxomicin 8/11.     - Flexi in place; strict I/Os  - Increased output may be contributing to hypotensive episode        Hematology  Acute deep vein thrombosis (DVT) of femoral vein of left lower extremity  LE venous doppler identified nonocclusive thrombus within the left mid and distal femoral vein.    - Continue  heparin gtt  - Monitor PTT     Oncology  Anemia in ESRD (end-stage renal disease)  - Nephrology following   - Patient should get epo regardless of dialysis sessions.     Endocrine  Severe obesity (BMI >= 40)  Morbid obesity complicating medical care.    Type 2 diabetes mellitus with microalbuminuria, without long-term current use of insulin  Latest A1C 6.8; takes metformin at home. No home insulin requirements.     - Holding Metformin  - Target -180. Continue to monitor.   - SSI    GI  Diarrhea  See Clostridium difficile infection.         Palliative Care  Goals of care, counseling/discussion  Code status changed to Full Code overnight on 8/1 by primary team. Spoke with daughter at bedside who stated that she would want her mother to be stepped back up to the ICU if vasopressors were required for hypotension    - On 8/4 code status switched to DNR after family discussion. See ACP note.         Other  Suspected deep tissue injury  Appreciate wound care recs    Debility  -PT/OT.       Critical Care Daily Checklist:    A: Awake: RASS Goal/Actual Goal: RASS Goal: 0-->alert and calm  Actual:     B: Spontaneous Breathing Trial Performed? Spon. Breathing Trial Initiated?: Initiated (07/16/24 1003)   C: SAT & SBT Coordinated?  N/a                      D: Delirium: CAM-ICU Overall CAM-ICU: Positive   E: Early Mobility Performed? No   F: Feeding Goal: Goals: Meet % EEN, EPN by RD f/u date  Status: Nutrition Goal Status: progressing towards goal   Current Diet Order   Procedures    Diet Renal      AS: Analgesia/Sedation Tylenol, hydrocodone-acetominophen, hydromorphone injection   T: Thromboembolic Prophylaxis Heparin tx dose   H: HOB > 300 Yes   U: Stress Ulcer Prophylaxis (if needed) None   G: Glucose Control SSI   B: Bowel Function Stool Occurrence: 1   I: Indwelling Catheter (Lines & Damon) Necessity L femoral trialysis 8/5, Rectal tube 7/28, PIV   D: De-escalation of Antimicrobials/Pharmacotherapies Zosyn Day  4, will complete Fidaxomicin 8/11    Plan for the day/ETD HD    Code Status:  Family/Goals of Care: DNR         Critical secondary to Patient has a condition that poses threat to life and bodily function: Acute Renal Failure and Shock      Critical care was time spent personally by me on the following activities: development of treatment plan with patient or surrogate and bedside caregivers, discussions with consultants, evaluation of patient's response to treatment, examination of patient, ordering and performing treatments and interventions, ordering and review of laboratory studies, ordering and review of radiographic studies, pulse oximetry, re-evaluation of patient's condition. This critical care time did not overlap with that of any other provider or involve time for any procedures.     Isabelle Valladares MD PGY1  Critical Care Medicine  [facility] - Cardiac Medical ICU

## 2024-08-05 NOTE — ASSESSMENT & PLAN NOTE
Code status changed to Full Code overnight on 8/1 by primary team. Spoke with daughter at bedside who stated that she would want her mother to be stepped back up to the ICU if vasopressors were required for hypotension    - On 8/4 code status switched to DNR after family discussion. See ACP note.

## 2024-08-05 NOTE — ASSESSMENT & PLAN NOTE
AFib with RVR appears new following cardiac arrest at OSH (7/3?) Per chart review, AFib was difficult to control. Patient endorses amiodarone reaction with itching, rash, and oral burning. Refused Sotalol, DCCV, and AICD placement at OSH. Was placed on Diltizem infusion and transitioned to PO metoprolol. Amio gtt restarted overnight 7/11 s/t uncontrolled afib with RVR. Transitioned to PO amio. However, patient's rate remained elevated despite amiodarone. Appeared to respond better to metoprolol.     - Holding lopressor in setting of septic shock  - Holding Apixaban, anticoagulation with a heparin infusion  - Digoxin 250 mg x1 dose to assist in lowering tachycardia

## 2024-08-05 NOTE — SUBJECTIVE & OBJECTIVE
Interval History/Significant Events: In AM, patient went to itch neck and accidentally dislodged her L IJ trialysis line. Unable to replace line in IJ due to clots in vessel bilaterally. Trialysis successfully placed in left femoral vein. Patient with continued encephalopathy. In sustained A fib with RVR, received 1 dose of digoxin overnight. Day 4 of Zosyn. Qtc 524. Patient not consuming much of meals, family will continue to encourage increased intake. Family not currently interested in NG tube. On HD.     Review of Systems   Reason unable to perform ROS: Patient obtunded.     Objective:     Vital Signs (Most Recent):  Temp: 97.3 °F (36.3 °C) (08/05/24 1101)  Pulse: 99 (08/05/24 1301)  Resp: 16 (08/05/24 1301)  BP: (!) 119/58 (08/05/24 1301)  SpO2: 100 % (08/05/24 1301) Vital Signs (24h Range):  Temp:  [97 °F (36.1 °C)-98.7 °F (37.1 °C)] 97.3 °F (36.3 °C)  Pulse:  [] 99  Resp:  [10-75] 16  SpO2:  [80 %-100 %] 100 %  BP: ()/(33-89) 119/58   Weight: 128.5 kg (283 lb 4.7 oz)  Body mass index is 44.37 kg/m².      Intake/Output Summary (Last 24 hours) at 8/5/2024 1345  Last data filed at 8/5/2024 1301  Gross per 24 hour   Intake 695.22 ml   Output 200 ml   Net 495.22 ml          Physical Exam  Constitutional:       Appearance: She is obese. She is ill-appearing.   HENT:      Head: Normocephalic and atraumatic.      Nose: Nose normal.      Mouth/Throat:      Mouth: Mucous membranes are moist.   Eyes:      Conjunctiva/sclera: Conjunctivae normal.   Cardiovascular:      Rate and Rhythm: Tachycardia present. Rhythm irregular.      Heart sounds: Normal heart sounds. No murmur heard.  Pulmonary:      Effort: Pulmonary effort is normal. No respiratory distress.      Breath sounds: Normal breath sounds. No wheezing.      Comments: 4 L O2 NC  Musculoskeletal:         General: Tenderness and signs of injury (Multiple blisters present) present.      Right lower leg: Edema present.      Left lower leg: Edema present.    Skin:     General: Skin is warm and dry.   Neurological:      Mental Status: She is disoriented.            Vents:  Vent Mode: Spont (07/16/24 1003)  Set Rate: 15 BPM (07/16/24 0903)  Vt Set: 420 mL (07/16/24 0903)  Pressure Support: 5 cmH20 (07/16/24 1003)  PEEP/CPAP: 5 cmH20 (07/16/24 1003)  Oxygen Concentration (%): 30 (08/02/24 2359)  Peak Airway Pressure: 10 cmH20 (07/16/24 1003)  Plateau Pressure: 0 cmH20 (07/16/24 1003)  Total Ve: 5.45 L/m (07/16/24 1003)  Negative Inspiratory Force (cm H2O): 0 (07/16/24 1003)  Lines/Drains/Airways       Central Venous Catheter Line  Duration             Trialysis (Dialysis) Catheter 08/05/24 0925 right femoral <1 day              Drain  Duration                  Rectal Tube 07/28/24 0000 8 days              Peripheral Intravenous Line  Duration                  Peripheral IV - Single Lumen 08/05/24 0730 18 G Right Other <1 day                  Significant Labs:    CBC/Anemia Profile:  Recent Labs   Lab 08/04/24  0352 08/04/24  1613 08/05/24  0507   WBC 21.75* 26.94* 22.82*   HGB 7.2* 7.4* 7.0*   HCT 22.8* 25.2* 23.5*   * 96* 83*   MCV 85 88 87   RDW 23.6* 23.8* 23.9*        Chemistries:  Recent Labs   Lab 08/04/24  0352 08/04/24  1333 08/04/24  2144 08/05/24  0507    138 138 138  138   K 4.3 4.2 4.1 4.0  4.0    106 105 105  105   CO2 16* 18* 16* 17*  17*   BUN 11 15 15 17  17   CREATININE 1.7* 2.3* 2.4* 2.5*  2.5*   CALCIUM 7.6* 8.7 8.7 8.3*  8.3*   ALBUMIN 1.5* 1.5* 1.6* 1.6*  1.6*   PROT  --   --   --  4.6*   BILITOT  --   --   --  2.3*   ALKPHOS  --   --   --  154*   ALT  --   --   --  11   AST  --   --   --  32   MG 2.1  --  2.3 2.1   PHOS 2.9  2.9 5.0* 5.0* 5.0*  5.0*       All pertinent labs within the past 24 hours have been reviewed.    Significant Imaging:  I have reviewed all pertinent imaging results/findings within the past 24 hours.  ECHO 8/5: EF 20-25%

## 2024-08-05 NOTE — EICU
Intervention Initiated From:  Bedside    Cristela intervened regarding:  Time-Out    Comments:   Called intro room via eLert for trialysis placement. Pt inadvertently dislodged previous L IJ catheter this AM. Dr Jacobs at bedside to replace L IJ catheter. Pt confused during procedure and interfering w/ sterile field. IM zyprexa x1 given and Precedex gtt started. Unable to thread guidewire x2 into L IJ. R fem trialysis line placed w/ no adverse events noted and sterility maintained.

## 2024-08-05 NOTE — ASSESSMENT & PLAN NOTE
- Nephrology following   - Patient should get epo regardless of dialysis sessions.   - Transfuse as needed, 1u pRBC 8/5   Body Location Override (Optional - Billing Will Still Be Based On Selected Body Map Location If Applicable): right radial dorsal hand Add 59460 Cpt? (Important Note: In 2017 The Use Of 93129 Is Being Tracked By Cms To Determine Future Global Period Reimbursement For Global Periods): yes Detail Level: Detailed

## 2024-08-05 NOTE — SUBJECTIVE & OBJECTIVE
Interval History:      Patient had received new access today (femoral line), sedated on pressors, blood pressure 93/50 still in contact precaution, , for Clostridium difficile, sodium and potassium are within normal limits, 138 and 4 respectively     Still hypotensive, 93/58 mmHg, serum creatinine 2.5, BUN 17, net I/O last 8 hours 293 mL, >>>family questions and concerns answered at bedside     Review of patient's allergies indicates:   Allergen Reactions    Percocet [oxycodone-acetaminophen] Itching    Januvia [sitagliptin]     Jardiance [empagliflozin]      Leg cramps    Lipitor [atorvastatin] Other (See Comments)     Severe leg pain    Linaclotide Other (See Comments) and Nausea And Vomiting     Does not remember    Lubiprostone Other (See Comments) and Palpitations     Does not remember     Current Facility-Administered Medications   Medication Frequency    0.9%  NaCl infusion (CRRT USE ONLY) Continuous    0.9%  NaCl infusion (for blood administration) Q24H PRN    acetaminophen tablet 1,000 mg Q8H PRN    albumin human 25% bottle 50 g Once    cholestyramine 4 gram packet 4 g BID    dexmedeTOMIDine in 0.9 % NaCL 400 mcg/100 mL (4 mcg/mL) infusion     dextromethorphan-guaiFENesin  mg/5 ml liquid 5 mL Q4H PRN    dextrose 10% bolus 125 mL 125 mL PRN    dextrose 10% bolus 250 mL 250 mL PRN    diphenhydrAMINE injection 12.5 mg Q6H PRN    duke's soln (benadryl 30 mL, mylanta 30 mL, LIDOcaine 30 mL, nystatin 30 mL) 120 mL QID    epoetin alaina injection 6,400 Units Every Mon, Wed, Fri    fidaxomicin tablet 200 mg BID    glucagon (human recombinant) injection 1 mg PRN    glucose chewable tablet 16 g PRN    glucose chewable tablet 24 g PRN    heparin (porcine) injection 1,000 Units PRN    heparin 25,000 units in dextrose 5% (100 units/ml) IV bolus from bag HIGH INTENSITY nomogram - [facility] PRN    heparin 25,000 units in dextrose 5% (100 units/ml) IV bolus from bag HIGH INTENSITY nomogram - [facility] PRN    heparin 25,000 units  in dextrose 5% 250 mL (100 units/mL) infusion HIGH INTENSITY nomogram - [facility] Continuous    HYDROcodone-acetaminophen 5-325 mg per tablet 1 tablet Q6H PRN    HYDROmorphone injection 0.5 mg Q2H PRN    hyoscyamine SL tablet 0.125 mg Q4H PRN    insulin aspart U-100 pen 0-10 Units QID (AC + HS) PRN    LIDOcaine 5 % patch 1 patch Q24H    LIDOcaine 5 % patch 2 patch Q24H    magnesium sulfate 2g in water 50mL IVPB (premix) PRN    metoprolol injection 5 mg Q6H PRN    miconazole NITRATE 2 % top powder BID    midodrine tablet 10 mg Daily PRN    naloxone 0.4 mg/mL injection 0.02 mg PRN    NORepinephrine 32 mg in D5W 250 mL infusion Continuous    ondansetron injection 4 mg Q6H PRN    piperacillin-tazobactam (ZOSYN) 4.5 g in D5W 100 mL IVPB (MB+) Q8H    sevelamer carbonate pwpk 0.8 g TID WM    simethicone chewable tablet 80 mg TID PRN    sodium chloride 0.9% bolus 250 mL 250 mL PRN    sodium chloride 0.9% flush 10 mL PRN    sodium chloride 0.9% flush 10 mL PRN    sodium phosphate 20.01 mmol in D5W 250 mL IVPB PRN    sodium phosphate 30 mmol in D5W 250 mL IVPB PRN    sodium phosphate 39.99 mmol in D5W 250 mL IVPB PRN    vasopressin (PITRESSIN) 0.2 Units/mL in D5W 100 mL infusion Continuous       Objective:     Vital Signs (Most Recent):  Temp: 98.2 °F (36.8 °C) (08/05/24 1346)  Pulse: (!) 114 (08/05/24 1346)  Resp: 15 (08/05/24 1346)  BP: (!) 95/51 (08/05/24 1346)  SpO2: 100 % (08/05/24 1346) Vital Signs (24h Range):  Temp:  [97 °F (36.1 °C)-98.5 °F (36.9 °C)] 98.2 °F (36.8 °C)  Pulse:  [] 114  Resp:  [10-75] 15  SpO2:  [80 %-100 %] 100 %  BP: ()/(33-89) 95/51     Weight: 128.5 kg (283 lb 4.7 oz) (08/05/24 1332)  Body mass index is 44.37 kg/m².  Body surface area is 2.46 meters squared.    I/O last 3 completed shifts:  In: 2064.9 [P.O.:120; I.V.:1296.3; IV Piggyback:648.7]  Out: 1960 [Other:1760; Stool:200]     Physical Exam  Constitutional:       Appearance: She is ill-appearing.   HENT:      Head: Normocephalic and  "atraumatic.   Cardiovascular:      Rate and Rhythm: Tachycardia present.   Pulmonary:      Effort: No respiratory distress.   Abdominal:      General: There is distension.   Musculoskeletal:      Right lower leg: Edema present.      Left lower leg: Edema present.   Neurological:      Comments: Sedated on pressors,          Significant Labs:  ABGs: No results for input(s): "PH", "PCO2", "HCO3", "POCSATURATED", "BE" in the last 168 hours.  CMP:   Recent Labs   Lab 08/05/24  0507     103   CALCIUM 8.3*  8.3*   ALBUMIN 1.6*  1.6*   PROT 4.6*     138   K 4.0  4.0   CO2 17*  17*     105   BUN 17  17   CREATININE 2.5*  2.5*   ALKPHOS 154*   ALT 11   AST 32   BILITOT 2.3*     LFTs:   Recent Labs   Lab 08/05/24  0507   ALT 11   AST 32   ALKPHOS 154*   BILITOT 2.3*   PROT 4.6*   ALBUMIN 1.6*  1.6*     No results for input(s): "COLORU", "CLARITYU", "SPECGRAV", "PHUR", "PROTEINUA", "GLUCOSEU", "BILIRUBINCON", "BLOODU", "WBCU", "RBCU", "BACTERIA", "MUCUS", "NITRITE", "LEUKOCYTESUR", "UROBILINOGEN", "HYALINECASTS" in the last 168 hours.  All labs within the past 24 hours have been reviewed.     Significant Imaging:  Labs reviewed   "

## 2024-08-05 NOTE — PROCEDURES
"Juhi Taylor is a 74 y.o. female patient.    Temp: 97 °F (36.1 °C) (08/05/24 0310)  Pulse: (!) 142 (08/05/24 0756)  Resp: 19 (08/05/24 0756)  BP: (!) 93/58 (08/05/24 0756)  SpO2: (!) 93 % (08/05/24 0756)  Weight: 128.5 kg (283 lb 4.7 oz) (08/01/24 1129)  Height: 5' 7" (170.2 cm) (08/01/24 1129)       Trialysis Central Line    Date/Time: 8/5/2024 10:29 AM    Performed by: Joby Jacobs MD  Authorized by: Ruben Neri MD    Location procedure was performed:  [facility]  Pre-operative diagnosis:  Shock, Renal failure  Post-operative diagnosis:  Shock, Renal failure  Consent Done ?:  Yes  Time out complete?: Verified correct patient, procedure, equipment, staff, and site/side    Indications:  Med administration and hemodialysis  Anesthesia:  See MAR for details  Preparation:  Skin prepped with ChloraPrep  Skin prep agent dried: Skin prep agent completely dried prior to procedure    Sterile barriers: All five maximal sterile barriers used - gloves, gown, cap, mask and large sterile sheet    Hand hygiene: Hand hygiene performed immediately prior to central venous catheter insertion    Location:  Right femoral  Site selection rationale:  Blood clot in bilateral Right IJV and Left IJV  Catheter type:  Trialysis  Catheter size:  13 Fr  Inserted Catheter Length (cm):  24  Ultrasound guidance: Yes    Vessel Caliber:  Large  Comprressibility:  Normal  Manometry: No    Number of attempts:  2  Securement:  Line sutured, chlorhexidine patch, sterile dressing applied and blood return through all ports  Complications: No    Estimated blood loss (mL):  5  Adverse Events:  None  Other Complications:  Manometry was not done because I was unable to thread the guide wire through the angiocath due to the adipose tissue bending the angiocath   Manometry was not done because I was unable to thread the guide wire through the angiocath due to the adipose tissue bending the angiocath      8/5/2024    "

## 2024-08-05 NOTE — ASSESSMENT & PLAN NOTE
Latest A1C 6.8; takes metformin at home. No home insulin requirements.     - Holding Metformin  - Target -180. Continue to monitor.   - SSI

## 2024-08-05 NOTE — ASSESSMENT & PLAN NOTE
- ESRD - was briefly on PD then HD, having issues w tunnel cath (removed on 7/31) and now her access is [facility] region.   - S/p R nephrectomy RCC  - C diff positive - on fidaxomicin  HTN  DM  Afib w rvr  CHF w ischemic cardiomyopathy  S/p cardiac arrest     Plan/Recommendations    - Patient is on on Levophed, Precedex, plan for SLED for today   -Continue to monitor intake and output  -      Anemia in ESRD;  - Hemoglobin goal in ESRD is 10-12g/dl  - Epo with HD   .  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  Lab Results   Component Value Date    .7 (H) 05/08/2024    CALCIUM 8.3 (L) 08/05/2024    CALCIUM 8.3 (L) 08/05/2024    CAION 1.02 (L) 07/20/2024    PHOS 5.0 (H) 08/05/2024    PHOS 5.0 (H) 08/05/2024

## 2024-08-06 NOTE — PROGRESS NOTES
08/06/24 0411   Treatment   Treatment Type SLED   Treatment Status Discontinued treatment   Dialyzer Time (hours) 13.21   BVP (Liters) 115.7 L   Solutions Labeled and Current  Yes   Access Temporary Cath   Catheter Dressing Intact  Yes   Alarms Engaged Yes   CRRT Hourly Documentation   Blood Flow (mL/min) 150   UF Rate 500 cc/hr   Arterial Pressure (mmHg) 70 mmHg   Venous Pressure (mmHg) 70 mmHg   Effluent Pressure (EP) (mmHg) 20 mmHg   Total UF (Hourly Cleared) (mL) 400     Report given to primary rn, 10 hr sled txd completed, deaccessed, saline locked, dressing intact

## 2024-08-06 NOTE — ASSESSMENT & PLAN NOTE
Diagnosed 7/22/24. Will complete 2 week course of Fidaxomicin 8/11.     - Flexi in place; strict I/Os

## 2024-08-06 NOTE — ASSESSMENT & PLAN NOTE
Likely multifactorial in the setting of septic shock, ICU delirium and chronic illness with prolonged hospital stay     - Continue to monitor

## 2024-08-06 NOTE — PROGRESS NOTES
[facility] - Cardiac Medical [facility]  Critical Care Medicine  Progress Note    Patient Name: Juhi Taylor  MRN: [id number]  Admission Date: 7/3/2024  Hospital Length of Stay: 34 days  Code Status: DNR  Attending Provider: Ruben Neri MD  Primary Care Provider: Tony Sadler MD   Principal Problem: Septic shock    Subjective:     HPI:  Juhi Taylor is a 74-year-old female with a past medical history of CKD stage 5 with recent PD catheter placement on 6/4, RCC s/p right nephrectomy, T2DM, obesity, CAD s/p PCI to Lcx and LAD in 11/2020 and HFmREF who is admitted as a transfer from [facility] for higher level of care and further cardiac evaluation. Nephrology consulted for hemodialysis.     She initially presented on 6/18/24 to [facility] for hypervolemia in the setting of CKD 4-5 for which she had undergone elective PD catheter placement complicated by catheter site leaking. She had a complex hospital course, was initially placed on furosemide gtt without significant improvement and later underwent placement of tunneled line for HD with volume removal. During her second dialysis session she went into PEA arrest for which she was resuscitated, intubated and transferred to ICU. She was noted to have elevated troponin and subsequent TTE found reduced EF from 40-45% to 20-25% for which coronary angiography was recommended. Her hospital course was also complicated by dialysis line infection, significant bleeding around the catheter site, bradycardia and subsequent atrial fibrillation with RVR. Given the catheter site bleeding, anticoagulation was temporarily held. She has reported allergy to amiodarone, was initially placed on diltiazem for atrial fibrillation despite reduced EF. She discussed GOC at outside hospital where it appears that she lacked capacity per note and decided to pursue higher level of care at [facility]. At that time, General Surgery was planning to place a left tunneled catheter due to concerns of the  right side not being suitable     Upon admit, patient appeared drowsy but conversant. Daughter reports that she typically gets lethargic 2/2 to the volume overload due to not receiving dialysis. Patient reported b/l left lower extremity pain. Critical Care Medicine was consulted for emergent trialysis line placement and dialysis.     Ms. Taylor was admitted to MICU on 7/8/24 for urgent dialysis line placement and initiation of dialysis. Patient received short run of CRRT overnight without issue. Encephalopathy improving with continued CRRT treatments. She has had persistent AFib with RVR for which received Digoxin loading dose (renally dosed) and was restarted PO carvedilol. Patient remains in persistent AFib RVR despite therapeutic digoxin level and uptitration of carvedilol dosages so she was started on an amiodarone infusion. She was then transitioned to amio PO and started on heparin infusion for atrial fibrillation. General Surgery removed her peritoneal dialysis catheter on 7/15/24, but upon induction she vomited copious amounts of bile and was endotracheally intubated. She was extubated to nasal cannula on 7/16/24.     She has had difficulties with her tunneled HD line with sluggish flow especially through the venous side.  She would have a 4-hour instillation of tPA in each line before being able to run iHD, and even then it would be stopped before completion.  Interventional Nephrology plans to replace the line at some point next week.  Of note, Palliative Care was also consulted and patient had made her wishes known that she is a DNR code status.  She was still vacillating between replacing her tunneled HD line given her adverse event during her last procedure or going home with hospice.  The family was upset with her decision and April, her niece, asked that Palliative Care not return to see the patient.      Hospital/ICU Course:  Ms. Taylor was admitted to MICU on 7/8 for urgent dialysis line  placement and initiated of HD. Patient received short run of CRRT overnight without issue. Encephalopathy improving with continued CRRT treatments. Persistent AFib with RVR, received Digoxin loading dose (renally dosed), restarted PO Carvedilol. Patient remains in persistent AFib RVR despite therapeutic digoxin level and uptitration of Carvedilol levels, started on Amiodarone infusion overnight. Transitioned to amio PO, started on heparin gtt for afib AC. Tolerated HD well overnight without complications and less encephalopathic upon exam. Planning for gen surg to remove PD catheter. Upon induction pt vomited copious amounts of bile, was intubated. PD cathter removed successfully. Extubated to NC 7/16.     7/18:  Patient made DNR after Palliative Care discussions yesterday.  Tolerated SLED 12 hours overnight.  Discussed difficulties regarding HD line with Interventional Nephrology and plans to replace it next week if patient it amenable.  Patient was stepped down from ICU.  7/19:  Stepped back up to medical ICU with plans for overnight SLED x 3 days.    On 7/19 Nephrology requested that the patient be stepped back up to the medical ICU for SLED. A temporary trialysis HD catheter was placed on 7/20/24 in the groin. She also began having diarrhea, which tested positive for Clostridium difficile so she was started on PO vancomycin 7/22/24. HD trial on 7/24; remained HDS. Ready for stepdown.    Stepped down to  on 7/26/24. On 8/2/2024, [facility] re-consulted for hypotension (70s/40s) and increased somnolence. Nephrology following for dialysis needs. Placed on vancomycin and zosyn due to concern for line infection, removed left femoral line. LIJ trialysis placed. On 8/3 she developed tachycardia and soft pressures with signs of distress. Code cart was rolled into the room. AED pads were placed but never used. Tachycardia improved but she required the addition of a second pressor. Digoxin added PRN for tachycardia. After  family discussion 8/5, patient made DNR. Patient accidentally removed left IJ trialysis 8/5, line could not be replaced in IJ due to blood clots. Line placed in left femoral vein. Receiving CRRT per nephro with fluid removal. NG inserted 8/6.        Interval History/Significant Events: Patient received 10 hours CRRT overnight. She remained in A fib and required two pressors for her hypotension. Goal to wean down to only levophed today. Plan to add amiodarone for sustained tachycardia >130. Qtc improved today, 425. Began stress dose steroids.     Review of Systems   Reason unable to perform ROS: Patient obtunded.     Objective:     Vital Signs (Most Recent):  Temp: 98.1 °F (36.7 °C) (08/06/24 0949)  Pulse: (!) 127 (08/06/24 0900)  Resp: 18 (08/06/24 0900)  BP: (!) 105/57 (08/06/24 0900)  SpO2: 96 % (08/06/24 0900) Vital Signs (24h Range):  Temp:  [97.7 °F (36.5 °C)-98.5 °F (36.9 °C)] 98.1 °F (36.7 °C)  Pulse:  [] 127  Resp:  [12-28] 18  SpO2:  [89 %-100 %] 96 %  BP: ()/(48-85) 105/57   Weight: 128.5 kg (283 lb 4.7 oz)  Body mass index is 44.37 kg/m².      Intake/Output Summary (Last 24 hours) at 8/6/2024 1345  Last data filed at 8/6/2024 0600  Gross per 24 hour   Intake 4055.56 ml   Output 6441 ml   Net -2385.44 ml          Physical Exam  Constitutional:       Appearance: She is obese. She is ill-appearing. She is not diaphoretic.   HENT:      Head: Normocephalic and atraumatic.      Nose: Nose normal.      Mouth/Throat:      Mouth: Mucous membranes are moist.   Eyes:      Conjunctiva/sclera: Conjunctivae normal.   Cardiovascular:      Rate and Rhythm: Tachycardia present. Rhythm irregular.      Heart sounds: Normal heart sounds. No murmur heard.  Pulmonary:      Effort: Pulmonary effort is normal. No respiratory distress.      Breath sounds: Normal breath sounds.      Comments: On 3 L O2 NC  Musculoskeletal:         General: Deformity (Blisters on legs) present.      Right lower leg: Edema present.       Left lower leg: Edema present.   Skin:     General: Skin is warm and dry.   Neurological:      Mental Status: She is disoriented.            Vents:  Vent Mode: Spont (07/16/24 1003)  Set Rate: 15 BPM (07/16/24 0903)  Vt Set: 420 mL (07/16/24 0903)  Pressure Support: 5 cmH20 (07/16/24 1003)  PEEP/CPAP: 5 cmH20 (07/16/24 1003)  Oxygen Concentration (%): 30 (08/02/24 2359)  Peak Airway Pressure: 10 cmH20 (07/16/24 1003)  Plateau Pressure: 0 cmH20 (07/16/24 1003)  Total Ve: 5.45 L/m (07/16/24 1003)  Negative Inspiratory Force (cm H2O): 0 (07/16/24 1003)  Lines/Drains/Airways       Central Venous Catheter Line  Duration             Trialysis (Dialysis) Catheter 08/05/24 0925 right femoral 1 day              Drain  Duration                  Rectal Tube 07/28/24 0000 9 days              Peripheral Intravenous Line  Duration                  Peripheral IV - Single Lumen 08/05/24 0730 18 G Right Other 1 day                  Significant Labs:    CBC/Anemia Profile:  Recent Labs   Lab 08/04/24  1613 08/05/24  0507 08/06/24  0441   WBC 26.94* 22.82* 18.50*   HGB 7.4* 7.0* 7.7*   HCT 25.2* 23.5* 24.9*   PLT 96* 83* 56*   MCV 88 87 87   RDW 23.8* 23.9* 22.2*        Chemistries:  Recent Labs   Lab 08/05/24  0507 08/05/24  1525 08/05/24  2119 08/06/24  0441     138 137  137  137 137 138   K 4.0  4.0 4.0  4.0  4.0 3.9 4.0     105 105  105  105 105 104   CO2 17*  17* 19*  19*  19* 21* 18*   BUN 17  17 17  17  17 10 6*   CREATININE 2.5*  2.5* 2.3*  2.3*  2.3* 1.5* 1.1   CALCIUM 8.3*  8.3* 8.0*  8.0*  8.0* 8.2* 8.0*   ALBUMIN 1.6*  1.6* 1.5*  1.5*  1.5* 2.5* 2.3*   PROT 4.6*  --   --   --    BILITOT 2.3*  --   --   --    ALKPHOS 154*  --   --   --    ALT 11  --   --   --    AST 32  --   --   --    MG 2.1 2.1  2.1  2.1 2.2 1.9   PHOS 5.0*  5.0* 4.2  4.2  4.2 2.7 2.1*  2.1*       All pertinent labs within the past 24 hours have been reviewed.    Significant Imaging:  I have reviewed all pertinent  "imaging results/findings within the past 24 hours.    ABG  No results for input(s): "PH", "PO2", "PCO2", "HCO3", "BE" in the last 168 hours.  Assessment/Plan:     Neuro  Acute encephalopathy  Likely multifactorial in the setting of septic shock, ICU delirium and chronic illness with prolonged hospital stay     - Continue to monitor      Cardiac/Vascular  Atrial fibrillation with RVR  AFib with RVR appears new following cardiac arrest at OSH (7/3?) Per chart review, AFib was difficult to control. Patient endorses amiodarone reaction with itching, rash, and oral burning. Refused Sotalol, DCCV, and AICD placement at OSH. Was placed on Diltizem infusion and transitioned to PO metoprolol. Amio gtt restarted overnight 7/11 s/t uncontrolled afib with RVR. Transitioned to PO amio. However, patient's rate remained elevated despite amiodarone. Appeared to respond better to metoprolol.     - Holding lopressor in setting of septic shock  - Holding Apixaban, anticoagulation with a heparin infusion  - Amiodarone for sustained tachycardia >130     Acute on chronic combined systolic and diastolic heart failure  History of combined systolic and diastolic HF.   ECHO:    Left Ventricle: The left ventricle is moderately dilated. Normal wall thickness. Global hypokinesis present. There is severely reduced systolic function with a visually estimated ejection fraction of 20 - 25%.    Right Ventricle: Mild right ventricular enlargement. Wall thickness is normal. Right ventricle wall motion has global hypokinesis. Systolic function is moderately reduced.    Left Atrium: Left atrium is severely dilated.    Right Atrium: Right atrium is severely dilated.    Aortic Valve: The aortic valve is a trileaflet valve. There is mild aortic valve sclerosis.    Mitral Valve: There is mild regurgitation.    Tricuspid Valve: There is severe regurgitation.    Pulmonary Artery: There is moderate pulmonary hypertension. The estimated pulmonary artery " systolic pressure is 61 mmHg.    IVC/SVC: Elevated venous pressure at 15 mmHg.    Pericardium: There is a trivial circumferential effusion. No indication of cardiac tamponade.    - Will require interventional cardiology for LHC vs PET for reduced EF once euvolemic  - LifeVest prior to discharge  - Entresto recommended by cardiology, but currently on hold due to sepsis  - Strict I/Os  - ECHO 8/5 without new findings     Essential hypertension  Takes carvedilol, clonidine at home.     - Hold antihypertensives in setting of hypotension.       Renal/  ESRD (end stage renal disease)  S/p R nephrectomy RCC   Vascular access has been a challenge. PD catheter removed 07/15. Tunneled dialysis catheter was not functioning, removed 8/1 by nephrology. Trialysis catheter placed in left fem on 7/20. Removed 8/2 for concern of infection. Trialysis L IJ placed 8/2, accidentally removed 8/5. Unable to place trialysis in BL IJ due to blood clots.     - L femoral trialysis placed 8/5  - SLED, volume removal  - Nephrology following:   - Do not recommend TPN at this time.   - Will use heparin locks for dialysis catheters.   - Patient should get epo regardless of dialysis sessions.     ID  * Septic shock  Concern for left femoral line infection, line removed 8/2. Left leg wound culture 7/31 + pseudomonas. C diff infection diagnosed 7/22.     - 2 week course of Fidaxomicin for C Diff, end date 8/11  - Continue Zosyn (Day 5)   - Repeat Blood cultures: NGTD  - Began stress dose steroids 8/6  - Levophed and vasopressin; goal MAP > 65  - Will wean off vasopressin as tolerated       Clostridium difficile infection  Diagnosed 7/22/24. Will complete 2 week course of Fidaxomicin 8/11.     - Flexi in place; strict I/Os        Hematology  Acute deep vein thrombosis (DVT) of femoral vein of left lower extremity  LE venous doppler identified nonocclusive thrombus within the left mid and distal femoral vein.    - Continue heparin gtt  - Monitor PTT      Oncology  Anemia in ESRD (end-stage renal disease)  - Nephrology following   - Patient should get epo regardless of dialysis sessions.   - Transfuse as needed, 1u pRBC 8/5    Endocrine  Severe obesity (BMI >= 40)  Morbid obesity complicating medical care.    Type 2 diabetes mellitus with microalbuminuria, without long-term current use of insulin  Latest A1C 6.8; takes metformin at home. No home insulin requirements.     - Holding Metformin  - Target -180. Continue to monitor.   - SSI    GI  Diarrhea  See Clostridium difficile infection.         Palliative Care  Goals of care, counseling/discussion  Code status changed to Full Code overnight on 8/1 by primary team. Spoke with daughter at bedside who stated that she would want her mother to be stepped back up to the ICU if vasopressors were required for hypotension    - On 8/4 code status switched to DNR after family discussion. See ACP note.   - NG placed 8/6 after approval by daughter Maria Luisa over phone. If NG removed, please call family to ask if they want it replaced.         Other  Suspected deep tissue injury  Appreciate wound care recs    Debility  -PT/OT.       Critical Care Daily Checklist:    A: Awake: RASS Goal/Actual Goal: RASS Goal: 0-->alert and calm  Actual:     B: Spontaneous Breathing Trial Performed? Spon. Breathing Trial Initiated?: Initiated (07/16/24 1003)   C: SAT & SBT Coordinated?  N/a                      D: Delirium: CAM-ICU Overall CAM-ICU: Positive   E: Early Mobility Performed? No   F: Feeding Goal: Goals: Meet % EEN, EPN by RD f/u date  Status: Nutrition Goal Status: progressing towards goal   Current Diet Order   Procedures    Diet Renal      AS: Analgesia/Sedation Tylenol, dilaudid    T: Thromboembolic Prophylaxis Heparin tx dose    H: HOB > 300 Yes   U: Stress Ulcer Prophylaxis (if needed) None   G: Glucose Control SSI   B: Bowel Function Stool Occurrence: 1   I: Indwelling Catheter (Lines & Damon) Necessity NG 8/6, L  femoral trialysis 8/5, Rectal tube 7/28,    D: De-escalation of Antimicrobials/Pharmacotherapies Zosyn Day 5, Miconazole, Fidaxomicin end 8/11    Plan for the day/ETD Monitor, wean off vasopressin    Code Status:  Family/Goals of Care: DNR         Critical secondary to Patient has a condition that poses threat to life and bodily function: Renal failure and Shock       Critical care was time spent personally by me on the following activities: development of treatment plan with patient or surrogate and bedside caregivers, discussions with consultants, evaluation of patient's response to treatment, examination of patient, ordering and performing treatments and interventions, ordering and review of laboratory studies, ordering and review of radiographic studies, pulse oximetry, re-evaluation of patient's condition. This critical care time did not overlap with that of any other provider or involve time for any procedures.     Isabelle Valladares MD PGY1  Critical Care Medicine  [facility] - Cardiac Medical ICU

## 2024-08-06 NOTE — ASSESSMENT & PLAN NOTE
AFib with RVR appears new following cardiac arrest at OSH (7/3?) Per chart review, AFib was difficult to control. Patient endorses amiodarone reaction with itching, rash, and oral burning. Refused Sotalol, DCCV, and AICD placement at OSH. Was placed on Diltizem infusion and transitioned to PO metoprolol. Amio gtt restarted overnight 7/11 s/t uncontrolled afib with RVR. Transitioned to PO amio. However, patient's rate remained elevated despite amiodarone. Appeared to respond better to metoprolol.     - Holding lopressor in setting of septic shock  - Holding Apixaban, anticoagulation with a heparin infusion  - Amiodarone for sustained tachycardia >130

## 2024-08-06 NOTE — PLAN OF CARE
[facility] Card[facility] Medical [facility]  Discharge Reassessment    Primary Care Provider: Tony Sadler MD    Expected Discharge Date: 8/8/2024    Reassessment (most recent)       Discharge Reassessment - 08/06/24 0928          Discharge Reassessment    Assessment Type Discharge Planning Reassessment     Did the patient's condition or plan change since previous assessment? Yes     Discharge Plan discussed with: Adult children     Discharge Plan A Skilled Nursing Facility     Discharge Plan B Rehab     DME Needed Upon Discharge  none     Transition of Care Barriers None     Why the patient remains in the hospital Requires continued medical care        Post-Acute Status    Post-Acute Authorization Placement     Diaylsis Status Set-up Complete/Auth obtained     Patient choice form signed by patient/caregiver List with quality metrics by geographic area provided     Discharge Delays None known at this time                   Patient is not medically ready for discharge.  Per MD: Patient had received new access today (femoral line), sedated on pressors, blood pressure 93/50 still in contact precaution, , for Clostridium difficile, sodium and potassium are within normal limits, 138 and 4 respectively  Still hypotensive, 93/58 mmHg, serum creatinine 2.5, BUN 17, net I/O last 8 hours 293 mL, >>>family questions and concerns answered at bedside       Discharge Plan A and Plan B have been determined by review of patient's clinical status, future medical and therapeutic needs, and coverage/benefits for post-acute care in coordination with multidisciplinary team members.       Candelaria Ramsey, SW  - [facility]

## 2024-08-06 NOTE — PROGRESS NOTES
08/06/24 1830   Treatment   Treatment Type SLED   Treatment Status Restart   Dialysis Machine Number [id number]   Dialyzer Time (hours) 0   BVP (Liters) 0 L   Solutions Labeled and Current  Yes   Access Temporary Cath;Right;Femoral   Catheter Dressing Intact  Yes   Alarms Engaged Yes   CRRT Comments sled restarted as ordered   Prescription   Time (Hours) 10   Dialysate K + (mEq/L) 4   Dialysate CA + (mEq/L) 2.25   Dialysate HCO3 - (Bicarb) (mEq/L) 30   Dialysate Na + (mEq/L) 140   Cartridge Type Other  (r300)   Dialysate Flow Rate (mL/min) 200   UF Goal Rate 500 mL/hr   CRRT Hourly Documentation   Blood Flow (mL/min) 150   UF Rate 200 cc/hr   Arterial Pressure (mmHg) -40 mmHg   Venous Pressure (mmHg) 130 mmHg   Effluent Pressure (EP) (mmHg) 20 mmHg   Total UF (Hourly Cleared) (mL) 0     SLED restarted as ordered. Right femoral CVC aspirated, flushed, and accessed using aseptic technique. Lines connected and secured.

## 2024-08-06 NOTE — PLAN OF CARE
MICU DAILY GOALS     Family/Goals of care/Code Status   Code Status: DNR    24H Vital Sign Range  Temp:  [97 °F (36.1 °C)-98.5 °F (36.9 °C)]   Pulse:  []   Resp:  [10-75]   BP: ()/(33-89)   SpO2:  [80 %-100 %]      Shift Events (include procedures and significant events)   Upon shift change, pt accidentally removed L trialysis catheter. MD called and charge called to bedside. R groin trialysis catheter placed per MD. Levo and vaso continued for MAP >65. 1 unit of pRBC tranfused per order. Albumin infused per order. CRRT, SLED started at approximately 1430. Echo obtained per order.  Heparin titrated according to nomogram, PTT ordered.     AWAKE RASS: Goal - RASS Goal: 0-->alert and calm  Actual - RASS (Jha Agitation-Sedation Scale): drowsy    Restraint necessity: Not necessary   BREATHE SBT: Not intubated    Coordinate A & B, analgesics/sedatives Pain: managed   SAT: Not intubated   Delirium CAM-ICU: Overall CAM-ICU: Positive   Early(intubated/ Progressive (non-intubated) Mobility MOVE Screen (INTUBATED ONLY): Not intubated    Activity: Activity Management: Arm raise - L1, Rolling - L1   Feeding/Nutrition Diet order: Diet/Nutrition Received: sips of water, Specialty Diet/Nutrition Received: renal diet   Thrombus DVT prophylaxis: VTE Required Core Measure: Pharmacological prophylaxis initiated/maintained   HOB Elevation Head of Bed (HOB) Positioning: HOB at 30-45 degrees   Ulcer Prophylaxis GI: yes   Glucose control managed Glycemic Management: blood glucose monitored   Skin Skin assessed during: Daily Assessment    Sacrum intact/not altered? No  Heels intact/not altered? Yes  Surgical wound? No    CHECK ONE!   (no altered skin or altered skin) and sub boxes:  [] No Altered Skin Integrity Present    []Prevention Measures Documented    [x] Altered Skin Integrity Present or Discovered   [x] LDA present in EPIC, daily doc completed              [x] LDA added if not in EPIC (describe wound).                     When describing wound, do not stage, use descriptive words only.    [x] Wound Image Taken (required on admit,                   transfer/discharge and every Tuesday)    Wound Care Consulted? Yes    4 EYES:  Attending Nurse (1st set of eyes): NEENA Bonilla    Second RN/Staff Member (2nd set of eyes): NEENA Clemens   Bowel Function Flexy     Indwelling Catheter Necessity      [REMOVED] Trialysis (Dialysis) Catheter 07/08/24 2210 left internal jugular-Line Necessity Review: CRRT/HD  [REMOVED]      Hemodialysis Catheter 07/11/24 1219 left subclavian-Line Necessity Review: CRRT/HD  [REMOVED] Trialysis (Dialysis) Catheter 07/20/24 1120 left femoral-Line Necessity Review: CRRT/HD  [REMOVED] Trialysis (Dialysis) Catheter 08/02/24 0452 left internal jugular-Line Necessity Review: CRRT/HD  Trialysis (Dialysis) Catheter 08/05/24 0925 right femoral-Line Necessity Review: CRRT/HD, Hemodynamic instability, Medication caustic to vasculature     De-escalation Antibiotics No        VS and assessment per flow sheet, patient progressing towards goals as tolerated, plan of care reviewed with  Juhi Taylor and family , all concerns addressed, will continue to monitor.

## 2024-08-06 NOTE — SUBJECTIVE & OBJECTIVE
Interval History/Significant Events: Patient received 10 hours CRRT overnight. She remained in A fib and required two pressors for her hypotension. Goal to wean down to only levophed today. Plan to add amiodarone for sustained tachycardia >130. Qtc improved today, 425. Began stress dose steroids.     Review of Systems   Reason unable to perform ROS: Patient obtunded.     Objective:     Vital Signs (Most Recent):  Temp: 98.1 °F (36.7 °C) (08/06/24 0949)  Pulse: (!) 127 (08/06/24 0900)  Resp: 18 (08/06/24 0900)  BP: (!) 105/57 (08/06/24 0900)  SpO2: 96 % (08/06/24 0900) Vital Signs (24h Range):  Temp:  [97.7 °F (36.5 °C)-98.5 °F (36.9 °C)] 98.1 °F (36.7 °C)  Pulse:  [] 127  Resp:  [12-28] 18  SpO2:  [89 %-100 %] 96 %  BP: ()/(48-85) 105/57   Weight: 128.5 kg (283 lb 4.7 oz)  Body mass index is 44.37 kg/m².      Intake/Output Summary (Last 24 hours) at 8/6/2024 1345  Last data filed at 8/6/2024 0600  Gross per 24 hour   Intake 4055.56 ml   Output 6441 ml   Net -2385.44 ml          Physical Exam  Constitutional:       Appearance: She is obese. She is ill-appearing. She is not diaphoretic.   HENT:      Head: Normocephalic and atraumatic.      Nose: Nose normal.      Mouth/Throat:      Mouth: Mucous membranes are moist.   Eyes:      Conjunctiva/sclera: Conjunctivae normal.   Cardiovascular:      Rate and Rhythm: Tachycardia present. Rhythm irregular.      Heart sounds: Normal heart sounds. No murmur heard.  Pulmonary:      Effort: Pulmonary effort is normal. No respiratory distress.      Breath sounds: Normal breath sounds.      Comments: On 3 L O2 NC  Musculoskeletal:         General: Deformity (Blisters on legs) present.      Right lower leg: Edema present.      Left lower leg: Edema present.   Skin:     General: Skin is warm and dry.   Neurological:      Mental Status: She is disoriented.            Vents:  Vent Mode: Spont (07/16/24 1003)  Set Rate: 15 BPM (07/16/24 0903)  Vt Set: 420 mL (07/16/24  0903)  Pressure Support: 5 cmH20 (07/16/24 1003)  PEEP/CPAP: 5 cmH20 (07/16/24 1003)  Oxygen Concentration (%): 30 (08/02/24 2359)  Peak Airway Pressure: 10 cmH20 (07/16/24 1003)  Plateau Pressure: 0 cmH20 (07/16/24 1003)  Total Ve: 5.45 L/m (07/16/24 1003)  Negative Inspiratory Force (cm H2O): 0 (07/16/24 1003)  Lines/Drains/Airways       Central Venous Catheter Line  Duration             Trialysis (Dialysis) Catheter 08/05/24 0925 right femoral 1 day              Drain  Duration                  Rectal Tube 07/28/24 0000 9 days              Peripheral Intravenous Line  Duration                  Peripheral IV - Single Lumen 08/05/24 0730 18 G Right Other 1 day                  Significant Labs:    CBC/Anemia Profile:  Recent Labs   Lab 08/04/24  1613 08/05/24  0507 08/06/24  0441   WBC 26.94* 22.82* 18.50*   HGB 7.4* 7.0* 7.7*   HCT 25.2* 23.5* 24.9*   PLT 96* 83* 56*   MCV 88 87 87   RDW 23.8* 23.9* 22.2*        Chemistries:  Recent Labs   Lab 08/05/24  0507 08/05/24  1525 08/05/24  2119 08/06/24  0441     138 137  137  137 137 138   K 4.0  4.0 4.0  4.0  4.0 3.9 4.0     105 105  105  105 105 104   CO2 17*  17* 19*  19*  19* 21* 18*   BUN 17  17 17  17  17 10 6*   CREATININE 2.5*  2.5* 2.3*  2.3*  2.3* 1.5* 1.1   CALCIUM 8.3*  8.3* 8.0*  8.0*  8.0* 8.2* 8.0*   ALBUMIN 1.6*  1.6* 1.5*  1.5*  1.5* 2.5* 2.3*   PROT 4.6*  --   --   --    BILITOT 2.3*  --   --   --    ALKPHOS 154*  --   --   --    ALT 11  --   --   --    AST 32  --   --   --    MG 2.1 2.1  2.1  2.1 2.2 1.9   PHOS 5.0*  5.0* 4.2  4.2  4.2 2.7 2.1*  2.1*       All pertinent labs within the past 24 hours have been reviewed.    Significant Imaging:  I have reviewed all pertinent imaging results/findings within the past 24 hours.

## 2024-08-06 NOTE — ASSESSMENT & PLAN NOTE
S/p R nephrectomy RCC   Vascular access has been a challenge. PD catheter removed 07/15. Tunneled dialysis catheter was not functioning, removed 8/1 by nephrology. Trialysis catheter placed in left fem on 7/20. Removed 8/2 for concern of infection. Trialysis L IJ placed 8/2, accidentally removed 8/5. Unable to place trialysis in BL IJ due to blood clots.     - L femoral trialysis placed 8/5  - SLED, volume removal  - Nephrology following:   - Do not recommend TPN at this time.   - Will use heparin locks for dialysis catheters.   - Patient should get epo regardless of dialysis sessions.

## 2024-08-06 NOTE — ASSESSMENT & PLAN NOTE
Code status changed to Full Code overnight on 8/1 by primary team. Spoke with daughter at bedside who stated that she would want her mother to be stepped back up to the ICU if vasopressors were required for hypotension    - On 8/4 code status switched to DNR after family discussion. See ACP note.   - NG placed 8/6 after approval by daughter Maria uLisa over phone. If NG removed, please call family to ask if they want it replaced.

## 2024-08-06 NOTE — ASSESSMENT & PLAN NOTE
Concern for left femoral line infection, line removed 8/2. Left leg wound culture 7/31 + pseudomonas. C diff infection diagnosed 7/22.     - 2 week course of Fidaxomicin for C Diff, end date 8/11  - Continue Zosyn (Day 5)   - Repeat Blood cultures: NGTD  - Began stress dose steroids 8/6  - Levophed and vasopressin; goal MAP > 65  - Will wean off vasopressin as tolerated

## 2024-08-06 NOTE — CONSULTS
[facility] - Card[facility] Medical [facility]  Wound Care    Patient Name:  Juhi Taylor   MRN:  [id number]  Date: 2024  Diagnosis: Septic shock    History:     Past Medical History:   Diagnosis Date    Anticoagulant long-term use     Arthritis     Breast cancer 2014    invasive lobular carcinoma    Cancer of kidney 2020    RIGHT KIDNEY CANCER    CHF (congestive heart failure)     Coronary artery disease dx     Depression     Diabetes mellitus     Diastolic heart failure secondary to hypertension     Gout     Hyperlipemia     Hypertension     Hypertrophy of nasal turbinates     Kidney mass     Right    Levoscoliosis     Lung nodule     left    Multiple thyroid nodules     NICOLE (obstructive sleep apnea)     uses C-PAP    Pulmonary hypertension     Severe sepsis 2024       Social History     Socioeconomic History    Marital status: Single    Number of children: 4   Occupational History    Occupation: retired Psych aid    Tobacco Use    Smoking status: Former     Current packs/day: 0.00     Types: Cigarettes     Start date: 2016     Quit date: 2016     Years since quittin.6    Smokeless tobacco: Never    Tobacco comments:     quit    Substance and Sexual Activity    Alcohol use: No    Drug use: No    Sexual activity: Not Currently     Partners: Male     Birth control/protection: None     Social Determinants of Health     Financial Resource Strain: Low Risk  (2024)    Overall Financial Resource Strain (CARDIA)     Difficulty of Paying Living Expenses: Not very hard   Recent Concern: Financial Resource Strain - Medium Risk (2024)    Overall Financial Resource Strain (CARDIA)     Difficulty of Paying Living Expenses: Somewhat hard   Food Insecurity: No Food Insecurity (2024)    Hunger Vital Sign     Worried About Running Out of Food in the Last Year: Never true     Ran Out of Food in the Last Year: Never true   Recent Concern: Food Insecurity - Food Insecurity Present  (6/20/2024)    Hunger Vital Sign     Worried About Running Out of Food in the Last Year: Sometimes true     Ran Out of Food in the Last Year: Never true   Transportation Needs: No Transportation Needs (7/5/2024)    TRANSPORTATION NEEDS     Transportation : No   Physical Activity: Inactive (7/5/2024)    Exercise Vital Sign     Days of Exercise per Week: 0 days     Minutes of Exercise per Session: 0 min   Stress: Patient Unable To Answer (7/5/2024)    [employer] - Occupational Stress Questionnaire     Feeling of Stress : Patient unable to answer   Housing Stability: Low Risk  (7/5/2024)    Housing Stability Vital Sign     Unable to Pay for Housing in the Last Year: No     Homeless in the Last Year: No       Precautions:     Allergies as of 07/02/2024 - Reviewed 06/23/2024   Allergen Reaction Noted    Percocet [oxycodone-acetaminophen] Itching 03/15/2022    Amiodarone analogues  01/16/2023    Irbesartan Swelling 12/11/2019    Januvia [sitagliptin]  06/28/2021    Jardiance [empagliflozin]  09/14/2020    Lipitor [atorvastatin] Other (See Comments) 12/08/2020    Linaclotide Other (See Comments) and Nausea And Vomiting 08/30/2017    Lubiprostone Other (See Comments) and Palpitations 08/30/2017       [facility] Assessment Details/Treatment     Patient seen for inpatient wound care consult and wound care follow up. Primary RN, support staff, and patient family member at bedside and agreeable to inpatient wound care assessment.    Sacrum noted to have suspected deep tissue injury with pink, red, maroon moist wound edges and black eschar at wound base. Some partial thickness tissue loss noted but not able to fully assess. No active drainage noted. Fecal management system in place with slight leak around edges. Skin integrity PABLO consulted and following. Recommendations to continue triad BID and PRN if soiled for moisture barrier and autolytic debridement.     Bilateral heels clean, dry, intact and  blanchable. Continue use of EHOB boots for offloading and pressure redistribution.    Right groin noted to still have ulceration with pink tissue. Scant serous drainage noted but wound healing improved. Continue Aquacel AG for silver, absorptive, and antimicrobial properties. Change every other day or PRN if soiled.    Left abdomen wound noted to be clean, scant drainage, and intact with healthy pink and red granulation tissue and minimal slough. Continue current order of aquacel AG and mepilex foam border dressing every other day or PRN if soiled.    Bilateral lower extremities with yellow, serous filled, blisters in different stages of draining. Pads saturated with serous drainage during assessment. Cleanse opened blisters with sterile normal saline and pat dry. Apply xeroform to wound base and cover with mepilex foam border dressing. Change daily or PRN if soiled. If blisters are intact, cover with mepilex foam border dressing until drained. Do not intentionally rupture blisters.    Nursing to maintain pressure injury prevention measures. Immerse surface for pressure redistribution and microclimate in use.       Reviewed chart for this encounter.  See flowsheet for findings.      Discussed POC with patient and primary nurse.  See EMR for orders and patient education.    Bedside nursing to continue care and monitoring.  Bedside to maintain pressure injury prevention interventions.        08/06/24 1400   WOCN Assessment   WOCN Total Time (mins) 45   Visit Date 08/06/24   Visit Time 1400   Consult Type New;Follow Up   WOCN Speciality Wound   Intervention assessed;changed;applied;chart review;coordination of care;orders   Teaching on-going        Wound 07/25/24 1139 Ulceration Right Groin   Date First Assessed/Time First Assessed: 07/25/24 1139   Primary Wound Type: Ulceration  Side: Right  Location: Groin   Dressing Appearance Moist drainage   Drainage Amount Scant   Drainage Characteristics/Odor Serous   Appearance  Pink;Red   Care Cleansed with:;Antimicrobial agent  (Vashe)   Dressing   (Aquacel and Mepilex)        Wound 07/25/24 1139 Ulceration Right lower;lateral Leg   Date First Assessed/Time First Assessed: 07/25/24 1139   Primary Wound Type: Ulceration  Side: Right  Orientation: lower;lateral  Location: Leg   Wound Image    Dressing Appearance Moist drainage;Saturated   Drainage Amount Moderate   Drainage Characteristics/Odor Serous   Appearance Pink;Red   Care Cleansed with:;Antimicrobial agent  (Vashe)   Dressing Applied  (Aquacel and mepilex foam)   Dressing Change Due 08/08/24        Wound 07/25/24 1139 Ulceration lower Abdomen   Date First Assessed/Time First Assessed: 07/25/24 1139   Present on Original Admission: Yes  Primary Wound Type: Ulceration  Orientation: lower  Location: Abdomen   Wound Image    Dressing Appearance Dry   Drainage Amount None   Drainage Characteristics/Odor No odor   Appearance Pink;Red   Care Cleansed with:;Antimicrobial agent  (Vashe)   Dressing   (Aquacel and mepilex)   Dressing Change Due 08/07/24        Wound 07/29/24 1134 Ulceration Coccyx   Date First Assessed/Time First Assessed: 07/29/24 1134   Present on Original Admission: No  Primary Wound Type: Ulceration  Location: Coccyx   Wound Image    Dressing Appearance Open to air   Drainage Amount None   Drainage Characteristics/Odor No odor   Appearance Black;Tan;Pink   Care Applied:;Skin Barrier  (Triad)        Wound 07/31/24 1030 Blister(s) Right posterior;lower Calf   Date First Assessed/Time First Assessed: 07/31/24 1030   Present on Original Admission: No  Primary Wound Type: Blister(s)  Side: Right  Orientation: posterior;lower  Location: Calf   Wound Image    Dressing Appearance Open to air   Drainage Amount Scant   Drainage Characteristics/Odor Serous   Appearance Pink;Red   Care Cleansed with:;Antimicrobial agent  (Xeroform)   Dressing   (Mepilex)        Wound 08/05/24 1500 Blister(s) Left anterior Thigh   Date First  Assessed/Time First Assessed: 08/05/24 1500   Primary Wound Type: Blister(s)  Side: Left  Orientation: anterior  Location: Thigh   Wound Image     Dressing Appearance Moist drainage   Drainage Amount Small   Drainage Characteristics/Odor Serous   Appearance Pink;Red   Care Cleansed with:;Antimicrobial agent   Dressing   (xeroform, mepilex foam)        Wound 08/05/24 1500 Blister(s) Left anterior;lower;posterior Leg   Date First Assessed/Time First Assessed: 08/05/24 1500   Primary Wound Type: Blister(s)  Side: Left  Orientation: anterior;lower;posterior  Location: Leg   Wound Image      Dressing Appearance Open to air   Drainage Amount Moderate   Drainage Characteristics/Odor Serous   Care Cleansed with:;Sterile normal saline;Applied:  (Xeroform)   Dressing   (Mepilex)                                             Orders placed.   Ambrosio LOPEZN, RN  08/06/2024

## 2024-08-06 NOTE — PLAN OF CARE
"ICU Care Plan    POC reviewed with Juhi Taylor and family at bedside overnight. Patient and Family verbalized understanding. Questions and concerns addressed. Pt progressing toward goals. CRRT ran overnight for 10 hours  See below and flowsheets for full assessment and VS info.   Temp:  [97.7 °F (36.5 °C)-98.3 °F (36.8 °C)]   Pulse:  []   Resp:  [12-24]   BP: ()/(48-85)   SpO2:  [89 %-100 %]     Neuro:  Ciarra Coma Scale  Best Eye Response: 4-->(E4) spontaneous  Best Motor Response: 6-->(M6) obeys commands  Best Verbal Response: 4-->(V4) confused  Ciarra Coma Scale Score: 14  Assessment Qualifiers: patient not sedated/intubated  Pupil PERRLA: yes    CV:   Rhythm: atrial rhythm  BP goals:   MAP > 65    Resp:   3 Liters nasal canula    GI/:  Diet/Nutrition Received: sips of water  Last Bowel Movement: 08/06/24  Voiding Characteristics: anuria, patient on CRRT    Intake/Output Summary (Last 24 hours) at 8/6/2024 0624  Last data filed at 8/6/2024 0600  Gross per 24 hour   Intake 4203.19 ml   Output 6041 ml   Net -1837.81 ml     Labs/Accuchecks:  Recent Labs   Lab 08/06/24  0441   WBC 18.50*   RBC 2.88*   HGB 7.7*   HCT 24.9*   PLT 56*      Recent Labs   Lab 08/05/24  0507 08/05/24  1525 08/06/24  0441     138   < > 138   K 4.0  4.0   < > 4.0   CO2 17*  17*   < > 18*     105   < > 104   BUN 17  17   < > 6*   CREATININE 2.5*  2.5*   < > 1.1   ALKPHOS 154*  --   --    ALT 11  --   --    AST 32  --   --    BILITOT 2.3*  --   --     < > = values in this interval not displayed.      Recent Labs   Lab 08/04/24  1613 08/04/24  2313 08/06/24  0441   INR 2.1*  --   --    APTT 53.4*   < > 69.6*    < > = values in this interval not displayed.    No results for input(s): "CPK", "CPKMB", "TROPONINI", "MB" in the last 168 hours.    Electrolytes: No replacement orders  Accuchecks: none    Gtts:   sodium chloride 0.9%   Intravenous Continuous   Stopped at 08/06/24 0411    heparin (porcine) in " D5W  0-40 Units/kg/hr (Adjusted) Intravenous Continuous 3.5 mL/hr at 08/06/24 0600 4 Units/kg/hr at 08/06/24 0600    NORepinephrine bitartrate-D5W  0-3 mcg/kg/min Intravenous Continuous 4.8 mL/hr at 08/06/24 0600 0.08 mcg/kg/min at 08/06/24 0600    vasopressin  0.04 Units/min Intravenous Continuous 12 mL/hr at 08/06/24 0600 0.04 Units/min at 08/06/24 0600       LDA/Wounds:    Nurses Note -- 4 Eyes    Is there altered skin present?  yes     Please check the following boxes that apply:   [x] LDA Added if Not in Epic (Describe Wound)   [x] New Altered Skin Integrity was Present on Admit and Documented in LDA   [x] Wound Image Taken    Wound Care Consulted? Yes     Second RN/Staff Member:  NEENA Bonilla

## 2024-08-07 PROBLEM — Z51.5 COMFORT MEASURES ONLY STATUS: Status: ACTIVE | Noted: 2024-01-01

## 2024-08-07 NOTE — ASSESSMENT & PLAN NOTE
- ESRD - was briefly on PD then HD, having issues w tunnel cath (removed on 7/31) and now her access is fermoral region.   - S/p R nephrectomy RCC  - C diff positive - on fidaxomicin  - HTN  - DM  - CHF w ischemic cardiomyopathy  - S/p cardiac arrest     Plan/Recommendations  -Plans to restart emergent SLED for clearance and volume removal given results of STAT ABG. Will plan for continuous SLED with UFR of 300-500 mL/hr as tolerated.   -Continue to monitor intake and output - net 2196       Anemia in ESRD;  - Hemoglobin goal in ESRD is 10-12g/dl  - Epo with HD   .  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

## 2024-08-07 NOTE — CONSULTS
"[facility] - Cardiac Medical [facility]  Adult Nutrition  Consult Note    SUMMARY     Recommendations    Initiate TFs when able - Rec'd Novasource @ 45 mL/hr to provide 2160 kcals, 98 g of protein, 774 mL fluid.  Continue Renal diet + ONS as tolerated.  RD to monitor & follow-up.    Goals: Meet % EEN, EPN by RD f/u date  Nutrition Goal Status: progressing towards goal  Communication of RD Recs: discussed on rounds    Assessment and Plan    Nutrition Problem:  Inadequate energy intake    Related to (etiology):   Decreased appetite     Signs and Symptoms (as evidenced by):   Poor PO intake; NGT placed    Interventions(treatment strategy):  Collaboration of nutrition care w/ other providers    Nutrition Diagnosis Status:   New     Reason for Assessment    Reason For Assessment: RD follow-up  Diagnosis: other (see comments) (ESRD)  Relevant Medical History: CKD5, DM  Interdisciplinary Rounds: attended    General Information Comments: NGT placed 2/2 poor PO intake - TFs to be initiated. Pt receiving CRRT. Ongoing GOC noted.  Nutrition Discharge Planning: Pending clinical course    Nutrition/Diet History    Spiritual, Cultural Beliefs, Congregation Practices, Values that Affect Care: no  Food Allergies: NKFA  Factors Affecting Nutritional Intake: decreased appetite    Anthropometrics    Temp: 98.6 °F (37 °C)  Height: 5' 7" (170.2 cm)  Height (inches): 67 in  Weight Method: Bed Scale  Weight: 128.5 kg (283 lb 4.7 oz)  Weight (lb): 283.29 lb  Ideal Body Weight (IBW), Female: 135 lb  % Ideal Body Weight, Female (lb): 209.84 %  BMI (Calculated): 44.4  BMI Grade: greater than 40 - morbid obesity    Lab/Procedures/Meds    Pertinent Labs Reviewed: reviewed  Pertinent Labs Comments: GFR 59.9  Pertinent Medications Reviewed: reviewed  Pertinent Medications Comments: Amiodarone, Precedex, Argatroban, Levophed, Vasopressin    Estimated/Assessed Needs    Weight Used For Calorie Calculations: 128.5 kg (283 lb 4.7 oz)    Energy Calorie " Requirements (kcal): 2011 kcal/d  Energy Need Method: [employer] (1.1 PAL)    Protein Requirements: 116-142 g/d (.9-1.1 g/kg)  Weight Used For Protein Calculations: 128.5 kg (283 lb 4.7 oz)    Estimated Fluid Requirement Method: other (see comments) (Per MD)  RDA Method (mL): 2011    CHO Requirement: 251g    Nutrition Prescription Ordered    Current Diet Order: Renal  Nutrition Order Comments: Novasource [facility]    Current Nutrition Support Formula Ordered: Novasource Renal    Evaluation of Received Nutrient/Fluid Intake    I/O: +1.6L since 7/24    Comments: LBM: 8/7    Tolerance: tolerating    Nutrition Risk    Level of Risk/Frequency of Follow-up:  (1x/week)     Monitor and Evaluation    Food and Nutrient Intake: energy intake, food and beverage intake, enteral nutrition intake  Food and Nutrient Adminstration: enteral and parenteral nutrition administration, diet order  Physical Activity and Function: nutrition-related ADLs and IADLs  Anthropometric Measurements: weight, weight change  Biochemical Data, Medical Tests and Procedures: glucose/endocrine profile, lipid profile, inflammatory profile, gastrointestinal profile, electrolyte and renal panel  Nutrition-Focused Physical Findings: overall appearance     Nutrition Follow-Up    RD Follow-up?: Yes

## 2024-08-07 NOTE — ASSESSMENT & PLAN NOTE
- ESRD - was briefly on PD then HD, having issues w tunnel cath (removed on 7/31) and now her access is [facility] region.   - S/p R nephrectomy RCC  - C diff positive - on fidaxomicin  - HTN  - DM  - CHF w ischemic cardiomyopathy  - S/p cardiac arrest     Plan/Recommendations  ===================    - Patient is on on Levophed, Precedex, SLED yesterday   -Continue to monitor intake and output - net 2196       Anemia in ESRD;  - Hemoglobin goal in ESRD is 10-12g/dl  - Epo with HD   .  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  Lab Results   Component Value Date    .7 (H) 05/08/2024    CALCIUM 8.1 (L) 08/06/2024    CAION 1.02 (L) 07/20/2024    PHOS 2.4 (L) 08/06/2024

## 2024-08-07 NOTE — ASSESSMENT & PLAN NOTE
History of combined systolic and diastolic HF.   ECHO:    Left Ventricle: The left ventricle is moderately dilated. Normal wall thickness. Global hypokinesis present. There is severely reduced systolic function with a visually estimated ejection fraction of 20 - 25%.    Right Ventricle: Mild right ventricular enlargement. Wall thickness is normal. Right ventricle wall motion has global hypokinesis. Systolic function is moderately reduced.    Left Atrium: Left atrium is severely dilated.    Right Atrium: Right atrium is severely dilated.    Aortic Valve: The aortic valve is a trileaflet valve. There is mild aortic valve sclerosis.    Mitral Valve: There is mild regurgitation.    Tricuspid Valve: There is severe regurgitation.    Pulmonary Artery: There is moderate pulmonary hypertension. The estimated pulmonary artery systolic pressure is 61 mmHg.    IVC/SVC: Elevated venous pressure at 15 mmHg.    Pericardium: There is a trivial circumferential effusion. No indication of cardiac tamponade.    - ECHO 8/5 without new findings

## 2024-08-07 NOTE — ASSESSMENT & PLAN NOTE
S/p R nephrectomy RCC   Vascular access has been a challenge. PD catheter removed 07/15. Tunneled dialysis catheter was not functioning, removed 8/1 by nephrology. Trialysis catheter placed in left fem on 7/20. Removed 8/2 for concern of infection. Trialysis L IJ placed 8/2, accidentally removed 8/5. Unable to place trialysis in BL IJ due to blood clots.     - L femoral trialysis placed 8/5  - Nephrology following  - Emergent SLED for clearance and volume removal after STAT ABG

## 2024-08-07 NOTE — PROGRESS NOTES
08/07/24 1530   Treatment   Treatment Type SLED   Treatment Status Restart   Dialysis Machine Number [id number]   Dialyzer Time (hours) 0   BVP (Liters) 0 L   Solutions Labeled and Current  Yes   Access Temporary Cath;Right;Femoral   Catheter Dressing Intact  Yes   Alarms Engaged Yes   CRRT Comments sled restarted as ordered   Prescription   Time (Hours) Continuous   Dialysate K + (mEq/L) 4   Dialysate CA + (mEq/L) 2.25   Dialysate HCO3 - (Bicarb) (mEq/L) 35   Dialysate Na + (mEq/L) 140   Cartridge Type Other  (r300)   Dialysate Flow Rate (mL/min) 200   UF Goal Rate 500 mL/hr   CRRT Hourly Documentation   Blood Flow (mL/min) 150   UF Rate 200 cc/hr   Arterial Pressure (mmHg) -40 mmHg   Venous Pressure (mmHg) 160 mmHg   Effluent Pressure (EP) (mmHg) 10 mmHg   Total UF (Hourly Cleared) (mL) 0     SLED restarted as ordered with albumin. Right femoral CVC aspirated, flushed, and accessed using aseptic technique. Lines connected and secured. Lines reversed.

## 2024-08-07 NOTE — SUBJECTIVE & OBJECTIVE
Interval History: sp 10 hr SLED overnight. Remain net positive ~770 mL/24 hrs.   Acidotic on repeat serum CMP, CO2 10, STAT ABG ordered. pH 7.08. HCO3 6.8.  Patient remain severely hypervolemic on exam, but appears to be oxygenating well 2-3 L NC.     Review of patient's allergies indicates:   Allergen Reactions    Heparin analogues      HIT panel ordered 8/7/24    Percocet [oxycodone-acetaminophen] Itching    Januvia [sitagliptin]     Jardiance [empagliflozin]      Leg cramps    Lipitor [atorvastatin] Other (See Comments)     Severe leg pain    Linaclotide Other (See Comments) and Nausea And Vomiting     Does not remember    Lubiprostone Other (See Comments) and Palpitations     Does not remember     Current Facility-Administered Medications   Medication Frequency    0.9%  NaCl infusion (CRRT USE ONLY) Continuous    0.9%  NaCl infusion (CRRT USE ONLY) Continuous    0.9%  NaCl infusion (for blood administration) Q24H PRN    0.9%  NaCl infusion (for blood administration) Q24H PRN    0.9%  NaCl infusion (for blood administration) Q24H PRN    acetaminophen tablet 1,000 mg Q8H PRN    albumin human 25% bottle 100 g Once    amiodarone 360 mg/200 mL (1.8 mg/mL) infusion Continuous    cholestyramine 4 gram packet 4 g BID    dexmedetomidine (PRECEDEX) 400mcg/100mL 0.9% NaCL infusion Continuous    dextromethorphan-guaiFENesin  mg/5 ml liquid 5 mL Q4H PRN    dextrose 10 % infusion Continuous    dextrose 10% bolus 125 mL 125 mL PRN    dextrose 10% bolus 250 mL 250 mL PRN    diphenhydrAMINE injection 12.5 mg Q6H PRN    duke's soln (benadryl 30 mL, mylanta 30 mL, LIDOcaine 30 mL, nystatin 30 mL) 120 mL QID    epoetin alaina injection 6,400 Units Every Mon, Wed, Fri    fidaxomicin tablet 200 mg BID    fludrocortisone tablet 100 mcg Daily    glucagon (human recombinant) injection 1 mg PRN    glucose chewable tablet 16 g PRN    glucose chewable tablet 24 g PRN    HYDROcodone-acetaminophen 5-325 mg per tablet 1 tablet Q6H PRN     hydrocortisone sodium succinate injection 100 mg Q8H    HYDROmorphone injection 0.5 mg Q2H PRN    hydrOXYzine pamoate capsule 25 mg Q8H PRN    hyoscyamine SL tablet 0.125 mg Q4H PRN    insulin aspart U-100 pen 0-10 Units QID (AC + HS) PRN    magnesium sulfate 2g in water 50mL IVPB (premix) PRN    magnesium sulfate 2g in water 50mL IVPB (premix) PRN    metoprolol injection 5 mg Q6H PRN    miconazole NITRATE 2 % top powder BID    midodrine tablet 10 mg Daily PRN    naloxone 0.4 mg/mL injection 0.02 mg PRN    nitroGLYCERIN 2% TD oint ointment 0.5 inch Q8H    NORepinephrine bitartrate-D5W 4 mg/250 mL (16 mcg/mL) PERIPHERAL access infusion Continuous    piperacillin-tazobactam (ZOSYN) 4.5 g in D5W 100 mL IVPB (MB+) Q8H    simethicone chewable tablet 80 mg TID PRN    sodium chloride 0.9% bolus 250 mL 250 mL PRN    sodium chloride 0.9% flush 10 mL PRN    sodium chloride 0.9% flush 10 mL PRN    sodium phosphate 20.01 mmol in D5W 250 mL IVPB PRN    sodium phosphate 20.01 mmol in D5W 250 mL IVPB PRN    sodium phosphate 30 mmol in D5W 250 mL IVPB PRN    sodium phosphate 30 mmol in D5W 250 mL IVPB PRN    sodium phosphate 39.99 mmol in D5W 250 mL IVPB PRN    sodium phosphate 39.99 mmol in D5W 250 mL IVPB PRN       Objective:     Vital Signs (Most Recent):  Temp: 98.4 °F (36.9 °C) (08/07/24 1200)  Pulse: 93 (08/07/24 1325)  Resp: 19 (08/07/24 1325)  BP: 134/63 (08/07/24 1325)  SpO2: (!) 80 % (08/07/24 1325) Vital Signs (24h Range):  Temp:  [97.6 °F (36.4 °C)-99.3 °F (37.4 °C)] 98.4 °F (36.9 °C)  Pulse:  [] 93  Resp:  [11-26] 19  SpO2:  [79 %-100 %] 80 %  BP: ()/(50-84) 134/63     Weight: 128.5 kg (283 lb 4.7 oz) (08/07/24 0900)  Body mass index is 44.37 kg/m².  Body surface area is 2.46 meters squared.    I/O last 3 completed shifts:  In: 6617.2 [P.O.:50; I.V.:5891.4; NG/GT:100; IV Piggyback:575.8]  Out: 7764 [Other:7164; Stool:600]     Physical Exam  Vitals and nursing note reviewed.   Constitutional:       General:  She is sleeping. She is not in acute distress.     Appearance: She is morbidly obese. She is ill-appearing.      Interventions: Nasal cannula in place.   HENT:      Head: Normocephalic and atraumatic.   Neck:      Comments: Sedated on pressors   Pulmonary:      Effort: No respiratory distress.   Abdominal:      General: There is distension.   Musculoskeletal:      Right lower leg: Edema present.      Left lower leg: Edema present.   Skin:     Coloration: Skin is not jaundiced.      Findings: No rash.   Neurological:      Mental Status: She is easily aroused.          Significant Labs:  CBC:   Recent Labs   Lab 08/07/24  0452 08/07/24  1020 08/07/24  1154   WBC 16.28*  --   --    RBC 2.64*  --   --    HGB 6.8*  --   --    HCT 25.0*   < > 31*   PLT 39*  --   --    MCV 95  --   --    MCH 25.8*  --   --    MCHC 27.2*  --   --     < > = values in this interval not displayed.     CMP:   Recent Labs   Lab 08/07/24  0618 08/07/24  0840   GLU  --  43*  43*   CALCIUM  --  8.5*   ALBUMIN 3.2*  --    PROT 5.3*  --    NA  --  139   K  --  4.7   CO2  --  10*   CL  --  104   BUN  --  6*   CREATININE  --  1.2   ALKPHOS 123  --    ALT 15  --    AST 38  --    BILITOT 4.7*  --      All labs within the past 24 hours have been reviewed.

## 2024-08-07 NOTE — PROGRESS NOTES
[facility] - Cardiac Medical [facility]  Critical Care Medicine  Progress Note    Patient Name: Juhi Taylor  MRN: [id number]  Admission Date: 7/3/2024  Hospital Length of Stay: 35 days  Code Status: DNR  Attending Provider: Ruben Neri MD  Primary Care Provider: Tony Sadler MD   Principal Problem: Septic shock    Subjective:     HPI:  Juhi Taylor is a 74-year-old female with a past medical history of CKD stage 5 with recent PD catheter placement on 6/4, RCC s/p right nephrectomy, T2DM, obesity, CAD s/p PCI to Lcx and LAD in 11/2020 and HFmREF who is admitted as a transfer from [facility] for higher level of care and further cardiac evaluation. Nephrology consulted for hemodialysis.     She initially presented on 6/18/24 to [facility] for hypervolemia in the setting of CKD 4-5 for which she had undergone elective PD catheter placement complicated by catheter site leaking. She had a complex hospital course, was initially placed on furosemide gtt without significant improvement and later underwent placement of tunneled line for HD with volume removal. During her second dialysis session she went into PEA arrest for which she was resuscitated, intubated and transferred to ICU. She was noted to have elevated troponin and subsequent TTE found reduced EF from 40-45% to 20-25% for which coronary angiography was recommended. Her hospital course was also complicated by dialysis line infection, significant bleeding around the catheter site, bradycardia and subsequent atrial fibrillation with RVR. Given the catheter site bleeding, anticoagulation was temporarily held. She has reported allergy to amiodarone, was initially placed on diltiazem for atrial fibrillation despite reduced EF. She discussed GOC at outside hospital where it appears that she lacked capacity per note and decided to pursue higher level of care at [facility] [place]. At that time, General Surgery was planning to place a left tunneled catheter due to concerns of the  right side not being suitable     Upon admit, patient appeared drowsy but conversant. Daughter reports that she typically gets lethargic 2/2 to the volume overload due to not receiving dialysis. Patient reported b/l left lower extremity pain. Critical Care Medicine was consulted for emergent trialysis line placement and dialysis.     Ms. Taylor was admitted to MICU on 7/8/24 for urgent dialysis line placement and initiation of dialysis. Patient received short run of CRRT overnight without issue. Encephalopathy improving with continued CRRT treatments. She has had persistent AFib with RVR for which received Digoxin loading dose (renally dosed) and was restarted PO carvedilol. Patient remains in persistent AFib RVR despite therapeutic digoxin level and uptitration of carvedilol dosages so she was started on an amiodarone infusion. She was then transitioned to amio PO and started on heparin infusion for atrial fibrillation. General Surgery removed her peritoneal dialysis catheter on 7/15/24, but upon induction she vomited copious amounts of bile and was endotracheally intubated. She was extubated to nasal cannula on 7/16/24.     She has had difficulties with her tunneled HD line with sluggish flow especially through the venous side.  She would have a 4-hour instillation of tPA in each line before being able to run iHD, and even then it would be stopped before completion.  Interventional Nephrology plans to replace the line at some point next week.  Of note, Palliative Care was also consulted and patient had made her wishes known that she is a DNR code status.  She was still vacillating between replacing her tunneled HD line given her adverse event during her last procedure or going home with hospice.  The family was upset with her decision and April, her niece, asked that Palliative Care not return to see the patient.      Hospital/ICU Course:  Ms. Taylor was admitted to MICU on 7/8 for urgent dialysis line  placement and initiated of HD. Patient received short run of CRRT overnight without issue. Encephalopathy improving with continued CRRT treatments. Persistent AFib with RVR, received Digoxin loading dose (renally dosed), restarted PO Carvedilol. Patient remains in persistent AFib RVR despite therapeutic digoxin level and uptitration of Carvedilol levels, started on Amiodarone infusion overnight. Transitioned to amio PO, started on heparin gtt for afib AC. Tolerated HD well overnight without complications and less encephalopathic upon exam. Planning for gen surg to remove PD catheter. Upon induction pt vomited copious amounts of bile, was intubated. PD cathter removed successfully. Extubated to NC 7/16.     7/18:  Patient made DNR after Palliative Care discussions yesterday.  Tolerated SLED 12 hours overnight.  Discussed difficulties regarding HD line with Interventional Nephrology and plans to replace it next week if patient it amenable.  Patient was stepped down from ICU.  7/19:  Stepped back up to medical ICU with plans for overnight SLED x 3 days.    On 7/19 Nephrology requested that the patient be stepped back up to the medical ICU for SLED. A temporary trialysis HD catheter was placed on 7/20/24 in the groin. She also began having diarrhea, which tested positive for Clostridium difficile so she was started on PO vancomycin 7/22/24. HD trial on 7/24; remained HDS. Ready for stepdown.    Stepped down to  on 7/26/24. On 8/2/2024, [facility] re-consulted for hypotension (70s/40s) and increased somnolence. Nephrology following for dialysis needs. Placed on vancomycin and zosyn due to concern for line infection, removed left femoral line. LIJ trialysis placed. On 8/3 she developed tachycardia and soft pressures with signs of distress. Code cart was rolled into the room. AED pads were placed but never used. Tachycardia improved but she required the addition of a second pressor. Digoxin added PRN for tachycardia. After  family discussion 8/5, patient made DNR. Patient accidentally removed left IJ trialysis 8/5, line could not be replaced in IJ due to blood clots. Line placed in left femoral vein. Receiving CRRT per nephro with fluid removal. NG inserted 8/6. In AM of 8/7, patient anemic, thrombocytopenic, and hypoglycemic. STAT ABG revealed pH 7.002/pCO2 43/pO2 26, emergent SLED began for clearance and volume removal. CPAP restarted, Lactate 16.57. Family (Maria Luisa and Nell) contacted, updated on current outlook. Plan to continue CPAP, Amiodarone and BP support, HD for now, hoping to extend time for family to visit, especially brother. Discontinued blood draws. Pain medicines for comfort ordered.         Interval History/Significant Events: Precedex was added overnight for agitation. Patient anemic and thrombocytopenic. Also became hypoglycemic, which failed to correct after 500 ml D10W bolus. D10W infusion hung. STAT ABG revealed pH 7.002/pCO2 43/pO2 26, emergent SLED began for clearance and volume removal. CPAP restarted, Lactate 16.57. Family (Maria Luisa and Nell) contacted, updated on current outlook. Plan to continue CPAP, Amiodarone and BP support, HD for now, hoping to extend time for family to visit, specially brother. Discontinued blood draws. Pain medicines for comfort ordered.     Review of Systems   Reason unable to perform ROS: Patient obtunded.     Objective:     Vital Signs (Most Recent):  Temp: 98.4 °F (36.9 °C) (08/07/24 1200)  Pulse: 83 (08/07/24 1600)  Resp: 20 (08/07/24 1600)  BP: 128/61 (08/07/24 1600)  SpO2: (!) 81 % (08/07/24 1530) Vital Signs (24h Range):  Temp:  [98 °F (36.7 °C)-99.3 °F (37.4 °C)] 98.4 °F (36.9 °C)  Pulse:  [] 83  Resp:  [11-26] 20  SpO2:  [79 %-100 %] 81 %  BP: ()/(50-84) 128/61   Weight: 128.5 kg (283 lb 4.7 oz)  Body mass index is 44.37 kg/m².      Intake/Output Summary (Last 24 hours) at 8/7/2024 1640  Last data filed at 8/7/2024 1600  Gross per 24 hour   Intake 5617.35 ml    Output 3631 ml   Net 1986.35 ml          Physical Exam  Constitutional:       Appearance: She is obese. She is ill-appearing. She is not diaphoretic.   HENT:      Head: Normocephalic and atraumatic.      Nose: Nose normal.      Comments: Some blood present around NG tube.   Cardiovascular:      Rate and Rhythm: Normal rate. Rhythm irregular.      Heart sounds: Normal heart sounds. No murmur heard.  Pulmonary:      Effort: Pulmonary effort is normal. No respiratory distress.      Breath sounds: Normal breath sounds.   Abdominal:      Palpations: Abdomen is soft.   Musculoskeletal:      Right lower leg: Edema present.      Left lower leg: Edema present.      Comments: Blisters present throughout.   Skin:     General: Skin is dry.      Findings: Bruising present.   Neurological:      Mental Status: She is disoriented.      Comments: Pt obtunded.            Vents:  Vent Mode: Spont (07/16/24 1003)  Set Rate: 15 BPM (07/16/24 0903)  Vt Set: 420 mL (07/16/24 0903)  Pressure Support: 5 cmH20 (07/16/24 1003)  PEEP/CPAP: 5 cmH20 (07/16/24 1003)  Oxygen Concentration (%): 100 (08/07/24 1206)  Peak Airway Pressure: 10 cmH20 (07/16/24 1003)  Plateau Pressure: 0 cmH20 (07/16/24 1003)  Total Ve: 5.45 L/m (07/16/24 1003)  Negative Inspiratory Force (cm H2O): 0 (07/16/24 1003)  Lines/Drains/Airways       Central Venous Catheter Line  Duration             Trialysis (Dialysis) Catheter 08/05/24 0925 right femoral 2 days              Drain  Duration                  Rectal Tube 07/28/24 0000 10 days         NG/OG Tube 08/06/24 1400 Right nostril 1 day              Peripheral Intravenous Line  Duration                  Peripheral IV - Single Lumen 08/05/24 0730 18 G Right Other 2 days                  Significant Labs:    CBC/Anemia Profile:  Recent Labs   Lab 08/06/24  0441 08/06/24  1403 08/07/24  0419 08/07/24  0452 08/07/24  1020 08/07/24  1154   WBC 18.50*  --  15.25* 16.28*  --   --    HGB 7.7*  --  6.7* 6.8*  --   --    HCT  24.9*  --  22.5* 25.0* 27* 31*   PLT 56*  --  40* 39*  --   --    MCV 87  --  90 95  --   --    RDW 22.2*  --  22.2* 22.3*  --   --    RETIC  --  4.7*  --   --   --   --         Chemistries:  Recent Labs   Lab 08/06/24  1403 08/06/24  2236 08/07/24  0419 08/07/24  0618 08/07/24  0840    139  139  139  139  --   --  139   K 3.9 4.1  4.1  4.1  4.1  --   --  4.7    104  104  104  104  --   --  104   CO2 20* 24 24 24 24  --   --  10*   BUN 9 5*  5*  5*  5*  --   --  6*   CREATININE 1.6* 1.0  1.0  1.0  1.0  --   --  1.2   CALCIUM 8.1* 8.2*  8.2*  8.2*  8.2*  --   --  8.5*   ALBUMIN 2.2* 3.5  3.5  3.5  3.5  --  3.2*  --    PROT  --   --   --  5.3*  --    BILITOT  --   --   --  4.7*  --    ALKPHOS  --   --   --  123  --    ALT  --   --   --  15  --    AST  --   --   --  38  --    MG 2.0 1.9  1.9  1.9  1.9 1.9  1.9  --   --    PHOS 2.4* 1.7*  1.7*  1.7*  1.7*  --   --  3.4       ABGs:   Recent Labs   Lab 08/07/24  1323   PH 7.040*   PCO2 36.2   HCO3 9.8*   POCSATURATED 45   BE -21*       Significant Imaging:  I have reviewed all pertinent imaging results/findings within the past 24 hours.    ABG  Recent Labs   Lab 08/07/24  1323   PH 7.040*   PO2 35*   PCO2 36.2   HCO3 9.8*   BE -21*     Assessment/Plan:     Neuro  Acute encephalopathy  Likely multifactorial in the setting of septic shock, ICU delirium and chronic illness with prolonged hospital stay         Cardiac/Vascular  Atrial fibrillation with RVR  AFib with RVR appears new following cardiac arrest at OSH (7/3?) Per chart review, AFib was difficult to control. Patient endorses amiodarone reaction with itching, rash, and oral burning. Refused Sotalol, DCCV, and AICD placement at OSH. Was placed on Diltizem infusion and transitioned to PO metoprolol. Amio gtt restarted overnight 7/11 s/t uncontrolled afib with RVR. Transitioned to PO amio. However, patient's rate remained elevated despite amiodarone. Appeared to respond  better to metoprolol.     - Holding lopressor in setting of septic shock  - Holding Apixaban, heparin infusion. Argatroban ordered.   - Amiodarone     Acute on chronic combined systolic and diastolic heart failure  History of combined systolic and diastolic HF.   ECHO:    Left Ventricle: The left ventricle is moderately dilated. Normal wall thickness. Global hypokinesis present. There is severely reduced systolic function with a visually estimated ejection fraction of 20 - 25%.    Right Ventricle: Mild right ventricular enlargement. Wall thickness is normal. Right ventricle wall motion has global hypokinesis. Systolic function is moderately reduced.    Left Atrium: Left atrium is severely dilated.    Right Atrium: Right atrium is severely dilated.    Aortic Valve: The aortic valve is a trileaflet valve. There is mild aortic valve sclerosis.    Mitral Valve: There is mild regurgitation.    Tricuspid Valve: There is severe regurgitation.    Pulmonary Artery: There is moderate pulmonary hypertension. The estimated pulmonary artery systolic pressure is 61 mmHg.    IVC/SVC: Elevated venous pressure at 15 mmHg.    Pericardium: There is a trivial circumferential effusion. No indication of cardiac tamponade.    - ECHO 8/5 without new findings     Essential hypertension  Takes carvedilol, clonidine at home.     - Hold antihypertensives in setting of hypotension.       Renal/  ESRD (end stage renal disease)  S/p R nephrectomy RCC   Vascular access has been a challenge. PD catheter removed 07/15. Tunneled dialysis catheter was not functioning, removed 8/1 by nephrology. Trialysis catheter placed in left fem on 7/20. Removed 8/2 for concern of infection. Trialysis L IJ placed 8/2, accidentally removed 8/5. Unable to place trialysis in BL IJ due to blood clots.     - L femoral trialysis placed 8/5  - Nephrology following  - Emergent SLED for clearance and volume removal after STAT ABG     ID  * Septic shock  Concern for left  femoral line infection, line removed 8/2. Left leg wound culture 7/31 + pseudomonas. C diff infection diagnosed 7/22.     - 2 week course of Fidaxomicin for C Diff, end date 8/11  - Continue Zosyn (Day 5)   - Repeat Blood cultures: NGTD  - Stress dose steroids, Levophed for pressure support         Clostridium difficile infection  Diagnosed 7/22/24. Will complete 2 week course of Fidaxomicin 8/11.     - Flexi in place        Hematology  Acute deep vein thrombosis (DVT) of femoral vein of left lower extremity  LE venous doppler identified nonocclusive thrombus within the left mid and distal femoral vein.    - Heparin held in setting of low platelet count.   - Argatroban    Oncology  Anemia in ESRD (end-stage renal disease)  1u pRBC 8/5, 8/7    - Nephrology following     Endocrine  Severe obesity (BMI >= 40)  Morbid obesity complicating medical care.    Type 2 diabetes mellitus with microalbuminuria, without long-term current use of insulin  Latest A1C 6.8; takes metformin at home. No home insulin requirements.     - Holding Metformin  - Target -180. Continue to monitor.   - SSI    GI  Diarrhea  See Clostridium difficile infection.         Palliative Care  Goals of care, counseling/discussion  Code status changed to Full Code overnight on 8/1 by primary team. Spoke with daughter at bedside who stated that she would want her mother to be stepped back up to the ICU if vasopressors were required for hypotension    - On 8/4 code status switched to DNR after family discussion. See ACP note.   - NG placed 8/6 after approval by daughter Maria Luisa over phone. If NG removed, please call family to ask if they want it replaced.     8/5: Plan to continue CPAP, Amiodarone and BP support, HD for now, hoping to extend time for family to visit, specially brother. Discontinued blood draws. Pain medicines for comfort ordered.       Other  Suspected deep tissue injury  Appreciate wound care recs    Debility  -PT/OT.       Critical  Care Daily Checklist:    A: Awake: RASS Goal/Actual Goal: RASS Goal: -2-->light sedation  Actual:     B: Spontaneous Breathing Trial Performed? Spon. Breathing Trial Initiated?: Initiated (07/16/24 1003)   C: SAT & SBT Coordinated?  N/a                      D: Delirium: CAM-ICU Overall CAM-ICU: Positive   E: Early Mobility Performed? No   F: Feeding Goal: Goals: Meet % EEN, EPN by RD f/u date  Status: Nutrition Goal Status: progressing towards goal   Current Diet Order   Procedures    Diet Renal      AS: Analgesia/Sedation Norco , hydromorphone   T: Thromboembolic Prophylaxis Argatroban    H: HOB > 300 Yes   U: Stress Ulcer Prophylaxis (if needed) None   G: Glucose Control None   B: Bowel Function Stool Occurrence: 1   I: Indwelling Catheter (Lines & Damon) Necessity L fem trialysis, PIV, rectal tube, NG   D: De-escalation of Antimicrobials/Pharmacotherapies Zosyn, Fidaxomicin, Miconazole     Plan for the day/ETD Extend care so family can visit     Code Status:  Family/Goals of Care: DNR         Critical secondary to Patient has a condition that poses threat to life and bodily function: Renal Failure, Shock requiring pressors      Critical care was time spent personally by me on the following activities: development of treatment plan with patient or surrogate and bedside caregivers, discussions with consultants, evaluation of patient's response to treatment, examination of patient, ordering and performing treatments and interventions, ordering and review of laboratory studies, ordering and review of radiographic studies, pulse oximetry, re-evaluation of patient's condition. This critical care time did not overlap with that of any other provider or involve time for any procedures.     Isabelle Valladares MD PGY1  Critical Care Medicine  [facility] - Cardiac Medical ICU

## 2024-08-07 NOTE — SUBJECTIVE & OBJECTIVE
Interval History:  sedated on pressors,  still on contact precaution,for Clostridium difficile, sodium and potassium are within normal limits, I/O - net 2196      Review of patient's allergies indicates:   Allergen Reactions    Percocet [oxycodone-acetaminophen] Itching    Januvia [sitagliptin]     Jardiance [empagliflozin]      Leg cramps    Lipitor [atorvastatin] Other (See Comments)     Severe leg pain    Linaclotide Other (See Comments) and Nausea And Vomiting     Does not remember    Lubiprostone Other (See Comments) and Palpitations     Does not remember     Current Facility-Administered Medications   Medication Frequency    0.9%  NaCl infusion (CRRT USE ONLY) Continuous    0.9%  NaCl infusion (for blood administration) Q24H PRN    acetaminophen tablet 1,000 mg Q8H PRN    albumin human 25% bottle 100 g Once    amiodarone 360 mg/200 mL (1.8 mg/mL) infusion Continuous    amiodarone 360 mg/200 mL (1.8 mg/mL) infusion Continuous    cholestyramine 4 gram packet 4 g BID    dextromethorphan-guaiFENesin  mg/5 ml liquid 5 mL Q4H PRN    dextrose 10% bolus 125 mL 125 mL PRN    dextrose 10% bolus 250 mL 250 mL PRN    diphenhydrAMINE injection 12.5 mg Q6H PRN    diphenhydrAMINE injection 12.5 mg Once PRN    duke's soln (benadryl 30 mL, mylanta 30 mL, LIDOcaine 30 mL, nystatin 30 mL) 120 mL QID    epoetin alaina injection 6,400 Units Every Mon, Wed, Fri    fidaxomicin tablet 200 mg BID    [START ON 8/7/2024] fludrocortisone tablet 100 mcg Daily    glucagon (human recombinant) injection 1 mg PRN    glucose chewable tablet 16 g PRN    glucose chewable tablet 24 g PRN    heparin (porcine) injection 5,000 Units PRN    heparin 25,000 units in dextrose 5% (100 units/ml) IV bolus from bag HIGH INTENSITY nomogram - [facility] PRN    heparin 25,000 units in dextrose 5% (100 units/ml) IV bolus from bag HIGH INTENSITY nomogram - [facility] PRN    heparin 25,000 units in dextrose 5% 250 mL (100 units/mL) infusion HIGH INTENSITY nomogram - [facility]  Continuous    HYDROcodone-acetaminophen 5-325 mg per tablet 1 tablet Q6H PRN    hydrocortisone sodium succinate injection 100 mg Q8H    HYDROmorphone injection 0.5 mg Q2H PRN    hyoscyamine SL tablet 0.125 mg Q4H PRN    insulin aspart U-100 pen 0-10 Units QID (AC + HS) PRN    magnesium sulfate 2g in water 50mL IVPB (premix) PRN    metoprolol injection 5 mg Q6H PRN    miconazole NITRATE 2 % top powder BID    midodrine tablet 10 mg Daily PRN    naloxone 0.4 mg/mL injection 0.02 mg PRN    NORepinephrine bitartrate-D5W 4 mg/250 mL (16 mcg/mL) PERIPHERAL access infusion Continuous    piperacillin-tazobactam (ZOSYN) 4.5 g in D5W 100 mL IVPB (MB+) Q8H    simethicone chewable tablet 80 mg TID PRN    sodium chloride 0.9% bolus 250 mL 250 mL PRN    sodium chloride 0.9% flush 10 mL PRN    sodium chloride 0.9% flush 10 mL PRN    sodium phosphate 20.01 mmol in D5W 250 mL IVPB PRN    sodium phosphate 30 mmol in D5W 250 mL IVPB PRN    sodium phosphate 39.99 mmol in D5W 250 mL IVPB PRN    vasopressin (PITRESSIN) 0.2 Units/mL in D5W 100 mL infusion Continuous       Objective:     Vital Signs (Most Recent):  Temp: 97.6 °F (36.4 °C) (08/06/24 1600)  Pulse: (!) 112 (08/06/24 1938)  Resp: 16 (08/06/24 1938)  BP: (!) 96/52 (08/06/24 1815)  SpO2: (!) 90 % (08/06/24 1938) Vital Signs (24h Range):  Temp:  [97.6 °F (36.4 °C)-98.1 °F (36.7 °C)] 97.6 °F (36.4 °C)  Pulse:  [101-140] 112  Resp:  [11-28] 16  SpO2:  [89 %-100 %] 90 %  BP: ()/(48-85) 96/52     Weight: 128.5 kg (283 lb 4.7 oz) (08/05/24 1332)  Body mass index is 44.37 kg/m².  Body surface area is 2.46 meters squared.    I/O last 3 completed shifts:  In: 5052.9 [P.O.:50; I.V.:4022.1; Blood:350; NG/GT:100; IV Piggyback:530.8]  Out: 6641 [Other:6041; Stool:600]     Physical Exam  HENT:      Head: Normocephalic and atraumatic.   Neck:      Comments: Sedated on pressors   Pulmonary:      Effort: No respiratory distress.   Abdominal:      General: There is distension.  "  Musculoskeletal:      Right lower leg: Edema present.      Left lower leg: Edema present.   Skin:     Coloration: Skin is not jaundiced.      Findings: No rash.          Significant Labs:  CBC:   Recent Labs   Lab 08/06/24  0441   WBC 18.50*   RBC 2.88*   HGB 7.7*   HCT 24.9*   PLT 56*   MCV 87   MCH 26.7*   MCHC 30.9*     CMP:   Recent Labs   Lab 08/05/24  0507 08/05/24  1525 08/06/24  1403     103   < > 117*   CALCIUM 8.3*  8.3*   < > 8.1*   ALBUMIN 1.6*  1.6*   < > 2.2*   PROT 4.6*  --   --      138   < > 138   K 4.0  4.0   < > 3.9   CO2 17*  17*   < > 20*     105   < > 104   BUN 17  17   < > 9   CREATININE 2.5*  2.5*   < > 1.6*   ALKPHOS 154*  --   --    ALT 11  --   --    AST 32  --   --    BILITOT 2.3*  --   --     < > = values in this interval not displayed.     LFTs:   Recent Labs   Lab 08/05/24  0507 08/05/24  1525 08/06/24  1403   ALT 11  --   --    AST 32  --   --    ALKPHOS 154*  --   --    BILITOT 2.3*  --   --    PROT 4.6*  --   --    ALBUMIN 1.6*  1.6*   < > 2.2*    < > = values in this interval not displayed.     No results for input(s): "COLORU", "CLARITYU", "SPECGRAV", "PHUR", "PROTEINUA", "GLUCOSEU", "BILIRUBINCON", "BLOODU", "WBCU", "RBCU", "BACTERIA", "MUCUS", "NITRITE", "LEUKOCYTESUR", "UROBILINOGEN", "HYALINECASTS" in the last 168 hours.  All labs within the past 24 hours have been reviewed.     Significant Imaging:  Labs reviewed   "

## 2024-08-07 NOTE — ASSESSMENT & PLAN NOTE
Likely multifactorial in the setting of septic shock, ICU delirium and chronic illness with prolonged hospital stay

## 2024-08-07 NOTE — ASSESSMENT & PLAN NOTE
Concern for left femoral line infection, line removed 8/2. Left leg wound culture 7/31 + pseudomonas. C diff infection diagnosed 7/22.     - 2 week course of Fidaxomicin for C Diff, end date 8/11  - Continue Zosyn (Day 5)   - Repeat Blood cultures: NGTD  - Stress dose steroids, Levophed for pressure support

## 2024-08-07 NOTE — SUBJECTIVE & OBJECTIVE
Interval History/Significant Events: Precedex was added overnight for agitation. Patient anemic and thrombocytopenic. Also became hypoglycemic, which failed to correct after 500 ml D10W bolus. D10W infusion hung. STAT ABG revealed pH 7.002/pCO2 43/pO2 26, emergent SLED began for clearance and volume removal. CPAP restarted, Lactate 16.57. Family (Maria Luisa and Nell) contacted, updated on current outlook. Plan to continue CPAP, Amiodarone and BP support, HD for now, hoping to extend time for family to visit, specially brother. Discontinued blood draws. Pain medicines for comfort ordered.     Review of Systems   Reason unable to perform ROS: Patient obtunded.     Objective:     Vital Signs (Most Recent):  Temp: 98.4 °F (36.9 °C) (08/07/24 1200)  Pulse: 83 (08/07/24 1600)  Resp: 20 (08/07/24 1600)  BP: 128/61 (08/07/24 1600)  SpO2: (!) 81 % (08/07/24 1530) Vital Signs (24h Range):  Temp:  [98 °F (36.7 °C)-99.3 °F (37.4 °C)] 98.4 °F (36.9 °C)  Pulse:  [] 83  Resp:  [11-26] 20  SpO2:  [79 %-100 %] 81 %  BP: ()/(50-84) 128/61   Weight: 128.5 kg (283 lb 4.7 oz)  Body mass index is 44.37 kg/m².      Intake/Output Summary (Last 24 hours) at 8/7/2024 1640  Last data filed at 8/7/2024 1600  Gross per 24 hour   Intake 5617.35 ml   Output 3631 ml   Net 1986.35 ml          Physical Exam  Constitutional:       Appearance: She is obese. She is ill-appearing. She is not diaphoretic.   HENT:      Head: Normocephalic and atraumatic.      Nose: Nose normal.      Comments: Some blood present around NG tube.   Cardiovascular:      Rate and Rhythm: Normal rate. Rhythm irregular.      Heart sounds: Normal heart sounds. No murmur heard.  Pulmonary:      Effort: Pulmonary effort is normal. No respiratory distress.      Breath sounds: Normal breath sounds.   Abdominal:      Palpations: Abdomen is soft.   Musculoskeletal:      Right lower leg: Edema present.      Left lower leg: Edema present.      Comments: Blisters present  throughout.   Skin:     General: Skin is dry.      Findings: Bruising present.   Neurological:      Mental Status: She is disoriented.      Comments: Pt obtunded.            Vents:  Vent Mode: Spont (07/16/24 1003)  Set Rate: 15 BPM (07/16/24 0903)  Vt Set: 420 mL (07/16/24 0903)  Pressure Support: 5 cmH20 (07/16/24 1003)  PEEP/CPAP: 5 cmH20 (07/16/24 1003)  Oxygen Concentration (%): 100 (08/07/24 1206)  Peak Airway Pressure: 10 cmH20 (07/16/24 1003)  Plateau Pressure: 0 cmH20 (07/16/24 1003)  Total Ve: 5.45 L/m (07/16/24 1003)  Negative Inspiratory Force (cm H2O): 0 (07/16/24 1003)  Lines/Drains/Airways       Central Venous Catheter Line  Duration             Trialysis (Dialysis) Catheter 08/05/24 0925 right femoral 2 days              Drain  Duration                  Rectal Tube 07/28/24 0000 10 days         NG/OG Tube 08/06/24 1400 Right nostril 1 day              Peripheral Intravenous Line  Duration                  Peripheral IV - Single Lumen 08/05/24 0730 18 G Right Other 2 days                  Significant Labs:    CBC/Anemia Profile:  Recent Labs   Lab 08/06/24  0441 08/06/24  1403 08/07/24  0419 08/07/24  0452 08/07/24  1020 08/07/24  1154   WBC 18.50*  --  15.25* 16.28*  --   --    HGB 7.7*  --  6.7* 6.8*  --   --    HCT 24.9*  --  22.5* 25.0* 27* 31*   PLT 56*  --  40* 39*  --   --    MCV 87  --  90 95  --   --    RDW 22.2*  --  22.2* 22.3*  --   --    RETIC  --  4.7*  --   --   --   --         Chemistries:  Recent Labs   Lab 08/06/24  1403 08/06/24  2236 08/07/24  0419 08/07/24  0618 08/07/24  0840    139  139  139  139  --   --  139   K 3.9 4.1  4.1  4.1  4.1  --   --  4.7    104  104  104  104  --   --  104   CO2 20* 24  24  24  24  --   --  10*   BUN 9 5*  5*  5*  5*  --   --  6*   CREATININE 1.6* 1.0  1.0  1.0  1.0  --   --  1.2   CALCIUM 8.1* 8.2*  8.2*  8.2*  8.2*  --   --  8.5*   ALBUMIN 2.2* 3.5  3.5  3.5  3.5  --  3.2*  --    PROT  --   --   --  5.3*  --     BILITOT  --   --   --  4.7*  --    ALKPHOS  --   --   --  123  --    ALT  --   --   --  15  --    AST  --   --   --  38  --    MG 2.0 1.9  1.9  1.9  1.9 1.9  1.9  --   --    PHOS 2.4* 1.7*  1.7*  1.7*  1.7*  --   --  3.4       ABGs:   Recent Labs   Lab 08/07/24  1323   PH 7.040*   PCO2 36.2   HCO3 9.8*   POCSATURATED 45   BE -21*       Significant Imaging:  I have reviewed all pertinent imaging results/findings within the past 24 hours.

## 2024-08-07 NOTE — PLAN OF CARE
Problem: Adult Inpatient Plan of Care  Goal: Plan of Care Review  Outcome: Progressing  Goal: Patient-Specific Goal (Individualized)  Outcome: Progressing  Goal: Optimal Comfort and Wellbeing  Outcome: Progressing     Problem: Diabetes Comorbidity  Goal: Blood Glucose Level Within Targeted Range  Outcome: Progressing     Problem: Sepsis/Septic Shock  Goal: Absence of Bleeding  Outcome: Progressing  Goal: Blood Glucose Level Within Targeted Range  Outcome: Progressing     Problem: Skin Injury Risk Increased  Goal: Skin Health and Integrity  Outcome: Progressing     Problem: Fall Injury Risk  Goal: Absence of Fall and Fall-Related Injury  Outcome: Progressing     Problem: CRRT (Continuous Renal Replacement Therapy)  Goal: Hemodynamic Stability  Outcome: Progressing

## 2024-08-07 NOTE — PROGRESS NOTES
[facility] - Card[facility] Medical [facility]  Nephrology  Progress Note    Patient Name: Juhi Taylor  MRN: [id number]  Admission Date: 7/3/2024  Hospital Length of Stay: 34 days  Attending Provider: Ruben Neri MD   Primary Care Physician: Tony Sadler MD  Principal Problem:Septic shock    Subjective:     HPI: 74-year-old female with prior history of CKD stage 5 with recent PD catheter placement on 6/4, RCC s/p right nephrectomy, T2DM, obesity, CAD s/p PCI to Lcx and LAD in 11/2020 and HFmREF who is admitted as a transfer from [facility] for higher level of care and further cardiac evaluation. Nephrology consulted for hemodialysis.     She initially presented on 6/18/24 to [facility] for hypervolemia in the setting of CKD 4-5 for which she had undergone elective PD catheter placement complicated by catheter site leaking. She had a complex hospital course, was initially placed on furosemide gtt without significant improvement and later underwent placement of tunneled line for HD with volume removal. During her second dialysis session she went into PEA arrest for which she was resuscitated, intubated and transferred to ICU. She was noted to have elevated troponin and subsequent TTE found reduced EF from 40-45% to 20-25% for which coronary angiography was recommended. Her hospital course was also complicated by dialysis line infection, significant bleeding around the catheter site, bradycardia and subsequent atrial fibrillation with RVR. Given the catheter site bleeding, anticoagulation was temporarily held. She has reported allergy to amiodarone, was initially placed on diltiazem for atrial fibrillation despite reduced EF. She discussed GOC at outside hospital where it appears that she lacked capacity per note and decided to pursue higher level of care [place] [facility]. At that time, gen surgery was planning to place a left tunneled catheter due to concerns of the right side not being suitable    Upon admit, patient appeared  drowsy but conversant. Daughter reports that she typically gets lethargic 2/2 to the volume overload due to not receiving dialysis. Patient reported b/l left lower extremity pain. Daughter concerned about her not having dialysis in 4 days and wants a session today.     Interval History:  sedated on pressors,  still on contact precaution,for Clostridium difficile, sodium and potassium are within normal limits, I/O - net 2196      Review of patient's allergies indicates:   Allergen Reactions    Percocet [oxycodone-acetaminophen] Itching    Januvia [sitagliptin]     Jardiance [empagliflozin]      Leg cramps    Lipitor [atorvastatin] Other (See Comments)     Severe leg pain    Linaclotide Other (See Comments) and Nausea And Vomiting     Does not remember    Lubiprostone Other (See Comments) and Palpitations     Does not remember     Current Facility-Administered Medications   Medication Frequency    0.9%  NaCl infusion (CRRT USE ONLY) Continuous    0.9%  NaCl infusion (for blood administration) Q24H PRN    acetaminophen tablet 1,000 mg Q8H PRN    albumin human 25% bottle 100 g Once    amiodarone 360 mg/200 mL (1.8 mg/mL) infusion Continuous    amiodarone 360 mg/200 mL (1.8 mg/mL) infusion Continuous    cholestyramine 4 gram packet 4 g BID    dextromethorphan-guaiFENesin  mg/5 ml liquid 5 mL Q4H PRN    dextrose 10% bolus 125 mL 125 mL PRN    dextrose 10% bolus 250 mL 250 mL PRN    diphenhydrAMINE injection 12.5 mg Q6H PRN    diphenhydrAMINE injection 12.5 mg Once PRN    duke's soln (benadryl 30 mL, mylanta 30 mL, LIDOcaine 30 mL, nystatin 30 mL) 120 mL QID    epoetin alaina injection 6,400 Units Every Mon, Wed, Fri    fidaxomicin tablet 200 mg BID    [START ON 8/7/2024] fludrocortisone tablet 100 mcg Daily    glucagon (human recombinant) injection 1 mg PRN    glucose chewable tablet 16 g PRN    glucose chewable tablet 24 g PRN    heparin (porcine) injection 5,000 Units PRN    heparin 25,000 units in dextrose 5% (100  units/ml) IV bolus from bag HIGH INTENSITY nomogram - [facility] PRN    heparin 25,000 units in dextrose 5% (100 units/ml) IV bolus from bag HIGH INTENSITY nomogram - [facility] PRN    heparin 25,000 units in dextrose 5% 250 mL (100 units/mL) infusion HIGH INTENSITY nomogram - [facility] Continuous    HYDROcodone-acetaminophen 5-325 mg per tablet 1 tablet Q6H PRN    hydrocortisone sodium succinate injection 100 mg Q8H    HYDROmorphone injection 0.5 mg Q2H PRN    hyoscyamine SL tablet 0.125 mg Q4H PRN    insulin aspart U-100 pen 0-10 Units QID (AC + HS) PRN    magnesium sulfate 2g in water 50mL IVPB (premix) PRN    metoprolol injection 5 mg Q6H PRN    miconazole NITRATE 2 % top powder BID    midodrine tablet 10 mg Daily PRN    naloxone 0.4 mg/mL injection 0.02 mg PRN    NORepinephrine bitartrate-D5W 4 mg/250 mL (16 mcg/mL) PERIPHERAL access infusion Continuous    piperacillin-tazobactam (ZOSYN) 4.5 g in D5W 100 mL IVPB (MB+) Q8H    simethicone chewable tablet 80 mg TID PRN    sodium chloride 0.9% bolus 250 mL 250 mL PRN    sodium chloride 0.9% flush 10 mL PRN    sodium chloride 0.9% flush 10 mL PRN    sodium phosphate 20.01 mmol in D5W 250 mL IVPB PRN    sodium phosphate 30 mmol in D5W 250 mL IVPB PRN    sodium phosphate 39.99 mmol in D5W 250 mL IVPB PRN    vasopressin (PITRESSIN) 0.2 Units/mL in D5W 100 mL infusion Continuous       Objective:     Vital Signs (Most Recent):  Temp: 97.6 °F (36.4 °C) (08/06/24 1600)  Pulse: (!) 112 (08/06/24 1938)  Resp: 16 (08/06/24 1938)  BP: (!) 96/52 (08/06/24 1815)  SpO2: (!) 90 % (08/06/24 1938) Vital Signs (24h Range):  Temp:  [97.6 °F (36.4 °C)-98.1 °F (36.7 °C)] 97.6 °F (36.4 °C)  Pulse:  [101-140] 112  Resp:  [11-28] 16  SpO2:  [89 %-100 %] 90 %  BP: ()/(48-85) 96/52     Weight: 128.5 kg (283 lb 4.7 oz) (08/05/24 1332)  Body mass index is 44.37 kg/m².  Body surface area is 2.46 meters squared.    I/O last 3 completed shifts:  In: 5052.9 [P.O.:50; I.V.:4022.1; Blood:350; NG/GT:100; IV  "Piggyback:530.8]  Out: 6641 [Other:6041; Stool:600]     Physical Exam  HENT:      Head: Normocephalic and atraumatic.   Neck:      Comments: Sedated on pressors   Pulmonary:      Effort: No respiratory distress.   Abdominal:      General: There is distension.   Musculoskeletal:      Right lower leg: Edema present.      Left lower leg: Edema present.   Skin:     Coloration: Skin is not jaundiced.      Findings: No rash.          Significant Labs:  CBC:   Recent Labs   Lab 08/06/24  0441   WBC 18.50*   RBC 2.88*   HGB 7.7*   HCT 24.9*   PLT 56*   MCV 87   MCH 26.7*   MCHC 30.9*     CMP:   Recent Labs   Lab 08/05/24  0507 08/05/24  1525 08/06/24  1403     103   < > 117*   CALCIUM 8.3*  8.3*   < > 8.1*   ALBUMIN 1.6*  1.6*   < > 2.2*   PROT 4.6*  --   --      138   < > 138   K 4.0  4.0   < > 3.9   CO2 17*  17*   < > 20*     105   < > 104   BUN 17  17   < > 9   CREATININE 2.5*  2.5*   < > 1.6*   ALKPHOS 154*  --   --    ALT 11  --   --    AST 32  --   --    BILITOT 2.3*  --   --     < > = values in this interval not displayed.     LFTs:   Recent Labs   Lab 08/05/24  0507 08/05/24  1525 08/06/24  1403   ALT 11  --   --    AST 32  --   --    ALKPHOS 154*  --   --    BILITOT 2.3*  --   --    PROT 4.6*  --   --    ALBUMIN 1.6*  1.6*   < > 2.2*    < > = values in this interval not displayed.     No results for input(s): "COLORU", "CLARITYU", "SPECGRAV", "PHUR", "PROTEINUA", "GLUCOSEU", "BILIRUBINCON", "BLOODU", "WBCU", "RBCU", "BACTERIA", "MUCUS", "NITRITE", "LEUKOCYTESUR", "UROBILINOGEN", "HYALINECASTS" in the last 168 hours.  All labs within the past 24 hours have been reviewed.     Significant Imaging:  Labs reviewed   Assessment/Plan:     Renal/  ESRD (end stage renal disease)  - ESRD - was briefly on PD then HD, having issues w tunnel cath (removed on 7/31) and now her access is fermoral region.   - S/p R nephrectomy RCC  - C diff positive - on fidaxomicin  - HTN  - DM  - CHF w ischemic " cardiomyopathy  - S/p cardiac arrest     Plan/Recommendations  ===================    - Patient is on on Levophed, Precedex, SLED yesterday   -Continue to monitor intake and output - net 2196       Anemia in ESRD;  - Hemoglobin goal in ESRD is 10-12g/dl  - Epo with HD   .  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  Lab Results   Component Value Date    .7 (H) 05/08/2024    CALCIUM 8.1 (L) 08/06/2024    CAION 1.02 (L) 07/20/2024    PHOS 2.4 (L) 08/06/2024            Thank you for your consult. I will follow-up with patient. Please contact us if you have any additional questions.    Jason King MD  Nephrology  [facility] - Cardiac Medical ICU

## 2024-08-07 NOTE — ASSESSMENT & PLAN NOTE
AFib with RVR appears new following cardiac arrest at OSH (7/3?) Per chart review, AFib was difficult to control. Patient endorses amiodarone reaction with itching, rash, and oral burning. Refused Sotalol, DCCV, and AICD placement at OSH. Was placed on Diltizem infusion and transitioned to PO metoprolol. Amio gtt restarted overnight 7/11 s/t uncontrolled afib with RVR. Transitioned to PO amio. However, patient's rate remained elevated despite amiodarone. Appeared to respond better to metoprolol.     - Holding lopressor in setting of septic shock  - Holding Apixaban, heparin infusion. Argatroban ordered.   - Amiodarone

## 2024-08-07 NOTE — ASSESSMENT & PLAN NOTE
Code status changed to Full Code overnight on 8/1 by primary team. Spoke with daughter at bedside who stated that she would want her mother to be stepped back up to the ICU if vasopressors were required for hypotension    - On 8/4 code status switched to DNR after family discussion. See ACP note.   - NG placed 8/6 after approval by daughter Maria Luisa over phone. If NG removed, please call family to ask if they want it replaced.     8/5: Plan to continue CPAP, Amiodarone and BP support, HD for now, hoping to extend time for family to visit, specially brother. Discontinued blood draws. Pain medicines for comfort ordered.

## 2024-08-07 NOTE — PROGRESS NOTES
08/07/24 0432   Treatment   Treatment Type SLED   Treatment Status Blood returned   Dialyzer Time (hours) 10.03   BVP (Liters) 85.1 L   CRRT Comments Tx completed; blood returned

## 2024-08-07 NOTE — ASSESSMENT & PLAN NOTE
LE venous doppler identified nonocclusive thrombus within the left mid and distal femoral vein.    - Heparin held in setting of low platelet count.   - Argatroban

## 2024-08-07 NOTE — PROGRESS NOTES
[facility]  Nephrology  Progress Note    Patient Name: Juhi Taylor  MRN: [id number]  Admission Date: 7/3/2024  Hospital Length of Stay: 35 days  Attending Provider: Ruben Neri MD   Primary Care Physician: Tony Sadler MD  Principal Problem:Septic shock    Subjective:     Interval History: sp 10 hr SLED overnight. Remain net positive ~770 mL/24 hrs.   Acidotic on repeat serum CMP, CO2 10, STAT ABG ordered. pH 7.08. HCO3 6.8.  Patient remain severely hypervolemic on exam, but appears to be oxygenating well 2-3 L NC.     Review of patient's allergies indicates:   Allergen Reactions    Heparin analogues      HIT panel ordered 8/7/24    Percocet [oxycodone-acetaminophen] Itching    Januvia [sitagliptin]     Jardiance [empagliflozin]      Leg cramps    Lipitor [atorvastatin] Other (See Comments)     Severe leg pain    Linaclotide Other (See Comments) and Nausea And Vomiting     Does not remember    Lubiprostone Other (See Comments) and Palpitations     Does not remember     Current Facility-Administered Medications   Medication Frequency    0.9%  NaCl infusion (CRRT USE ONLY) Continuous    0.9%  NaCl infusion (CRRT USE ONLY) Continuous    0.9%  NaCl infusion (for blood administration) Q24H PRN    0.9%  NaCl infusion (for blood administration) Q24H PRN    0.9%  NaCl infusion (for blood administration) Q24H PRN    acetaminophen tablet 1,000 mg Q8H PRN    albumin human 25% bottle 100 g Once    amiodarone 360 mg/200 mL (1.8 mg/mL) infusion Continuous    cholestyramine 4 gram packet 4 g BID    dexmedetomidine (PRECEDEX) 400mcg/100mL 0.9% NaCL infusion Continuous    dextromethorphan-guaiFENesin  mg/5 ml liquid 5 mL Q4H PRN    dextrose 10 % infusion Continuous    dextrose 10% bolus 125 mL 125 mL PRN    dextrose 10% bolus 250 mL 250 mL PRN    diphenhydrAMINE injection 12.5 mg Q6H PRN    duke's soln (benadryl 30 mL, mylanta 30 mL, LIDOcaine 30 mL, nystatin 30 mL) 120 mL QID     epoetin alaina injection 6,400 Units Every Mon, Wed, Fri    fidaxomicin tablet 200 mg BID    fludrocortisone tablet 100 mcg Daily    glucagon (human recombinant) injection 1 mg PRN    glucose chewable tablet 16 g PRN    glucose chewable tablet 24 g PRN    HYDROcodone-acetaminophen 5-325 mg per tablet 1 tablet Q6H PRN    hydrocortisone sodium succinate injection 100 mg Q8H    HYDROmorphone injection 0.5 mg Q2H PRN    hydrOXYzine pamoate capsule 25 mg Q8H PRN    hyoscyamine SL tablet 0.125 mg Q4H PRN    insulin aspart U-100 pen 0-10 Units QID (AC + HS) PRN    magnesium sulfate 2g in water 50mL IVPB (premix) PRN    magnesium sulfate 2g in water 50mL IVPB (premix) PRN    metoprolol injection 5 mg Q6H PRN    miconazole NITRATE 2 % top powder BID    midodrine tablet 10 mg Daily PRN    naloxone 0.4 mg/mL injection 0.02 mg PRN    nitroGLYCERIN 2% TD oint ointment 0.5 inch Q8H    NORepinephrine bitartrate-D5W 4 mg/250 mL (16 mcg/mL) PERIPHERAL access infusion Continuous    piperacillin-tazobactam (ZOSYN) 4.5 g in D5W 100 mL IVPB (MB+) Q8H    simethicone chewable tablet 80 mg TID PRN    sodium chloride 0.9% bolus 250 mL 250 mL PRN    sodium chloride 0.9% flush 10 mL PRN    sodium chloride 0.9% flush 10 mL PRN    sodium phosphate 20.01 mmol in D5W 250 mL IVPB PRN    sodium phosphate 20.01 mmol in D5W 250 mL IVPB PRN    sodium phosphate 30 mmol in D5W 250 mL IVPB PRN    sodium phosphate 30 mmol in D5W 250 mL IVPB PRN    sodium phosphate 39.99 mmol in D5W 250 mL IVPB PRN    sodium phosphate 39.99 mmol in D5W 250 mL IVPB PRN       Objective:     Vital Signs (Most Recent):  Temp: 98.4 °F (36.9 °C) (08/07/24 1200)  Pulse: 93 (08/07/24 1325)  Resp: 19 (08/07/24 1325)  BP: 134/63 (08/07/24 1325)  SpO2: (!) 80 % (08/07/24 1325) Vital Signs (24h Range):  Temp:  [97.6 °F (36.4 °C)-99.3 °F (37.4 °C)] 98.4 °F (36.9 °C)  Pulse:  [] 93  Resp:  [11-26] 19  SpO2:  [79 %-100 %] 80 %  BP: ()/(50-84) 134/63     Weight: 128.5 kg (283 lb  4.7 oz) (08/07/24 0900)  Body mass index is 44.37 kg/m².  Body surface area is 2.46 meters squared.    I/O last 3 completed shifts:  In: 6617.2 [P.O.:50; I.V.:5891.4; NG/GT:100; IV Piggyback:575.8]  Out: 7764 [Other:7164; Stool:600]     Physical Exam  Vitals and nursing note reviewed.   Constitutional:       General: She is sleeping. She is not in acute distress.     Appearance: She is morbidly obese. She is ill-appearing.      Interventions: Nasal cannula in place.   HENT:      Head: Normocephalic and atraumatic.   Neck:      Comments: Sedated on pressors   Pulmonary:      Effort: No respiratory distress.   Abdominal:      General: There is distension.   Musculoskeletal:      Right lower leg: Edema present.      Left lower leg: Edema present.   Skin:     Coloration: Skin is not jaundiced.      Findings: No rash.   Neurological:      Mental Status: She is easily aroused.          Significant Labs:  CBC:   Recent Labs   Lab 08/07/24  0452 08/07/24  1020 08/07/24  1154   WBC 16.28*  --   --    RBC 2.64*  --   --    HGB 6.8*  --   --    HCT 25.0*   < > 31*   PLT 39*  --   --    MCV 95  --   --    MCH 25.8*  --   --    MCHC 27.2*  --   --     < > = values in this interval not displayed.     CMP:   Recent Labs   Lab 08/07/24  0618 08/07/24  0840   GLU  --  43*  43*   CALCIUM  --  8.5*   ALBUMIN 3.2*  --    PROT 5.3*  --    NA  --  139   K  --  4.7   CO2  --  10*   CL  --  104   BUN  --  6*   CREATININE  --  1.2   ALKPHOS 123  --    ALT 15  --    AST 38  --    BILITOT 4.7*  --      All labs within the past 24 hours have been reviewed.     Assessment/Plan:     Renal/  ESRD (end stage renal disease)  - ESRD - was briefly on PD then HD, having issues w tunnel cath (removed on 7/31) and now her access is fermoral region.   - S/p R nephrectomy RCC  - C diff positive - on fidaxomicin  - HTN  - DM  - CHF w ischemic cardiomyopathy  - S/p cardiac arrest     Plan/Recommendations  -Plans to restart emergent SLED for clearance and  volume removal given results of STAT ABG. Will plan for continuous SLED with UFR of 300-500 mL/hr as tolerated.   -Continue to monitor intake and output - net 2196       Anemia in ESRD;  - Hemoglobin goal in ESRD is 10-12g/dl  - Epo with HD   .  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 I will follow-up with patient. Please contact us if you have any additional questions.    Aysha Reyes DNP, FNP-C  Nephrology  [facility]

## 2024-08-08 NOTE — CARE UPDATE
Death Note     Called to bedside by patient's nurse. Nursing supervisor notified. Family at bedside.  has been called and is also at bedside.     Patient is not responding to verbal or tactile stimuli. Pupils are fixed and dilated. No heart or breath sounds on auscultation. No respirations. No palpable pulses.      Time of death:   Cause of Death:        -------------------------------------------------------------------------------------------------------------------  Electronically signed by:     Connie Ashby DO  PGY-2 Internal Medicine  [facility]: [email or web address]

## 2024-08-08 NOTE — PLAN OF CARE
[facility]  Discharge Final Note    Primary Care Provider: Tony Sadler MD    Expected Discharge Date: 2024    Patient  2024 at 0000 hours per MD. Family and  at bedside.    Final Discharge Note (most recent)       Final Note - 24 0937          Final Note    Assessment Type Final Discharge Note     Anticipated Discharge Disposition         Post-Acute Status    Discharge Delays None known at this time                     Important Message from Medicare             Sonia Preston RN     [phone]

## 2024-08-08 NOTE — ACP (ADVANCE CARE PLANNING)
Advance Care Planning     Date: 08/07/2024    [facility]  I engaged the family in a voluntary conversation about advance care planning and we specifically addressed what the goals of care would be moving forward, in light of the patient's change in clinical status, specifically withdrawing vasopressors and dialysis.  We did specifically address the patient's likely prognosis, which is poor.  We explored the patient's values and preferences for future care.  The family endorses that what is most important right now is to focus on comfort and QOL     Accordingly, we have decided that the best plan to meet the patient's goals includes pivot to comfort-focused care    A total of 20 min was spent on advance care planning, goals of care discussion, emotional support, formulating and communicating prognosis and exploring burden/benefit of various approaches of treatment. This discussion occurred on a fully voluntary basis with the verbal consent of the patient and/or family.

## 2024-08-08 NOTE — DISCHARGE SUMMARY
[facility] - Cardiac Medical ICU  Critical Care Medicine  Discharge Summary      Patient Name: Juhi Taylor  MRN: [id number]  Admission Date: 7/3/2024  Hospital Length of Stay: 36 days  Discharge Date and Time:  08/08/2024 12:47 AM  Attending Physician: Ruben Neri MD   Discharging Provider: Connie Ashby DO  Primary Care Provider: Tony Sadler MD  Reason for Admission: Cardiac arrest    HPI:   Juhi Taylor is a 74-year-old female with a past medical history of CKD stage 5 with recent PD catheter placement on 6/4, RCC s/p right nephrectomy, T2DM, obesity, CAD s/p PCI to Lcx and LAD in 11/2020 and HFmREF who is admitted as a transfer from [facility] for higher level of care and further cardiac evaluation. Nephrology consulted for hemodialysis.     She initially presented on 6/18/24 to [facility] for hypervolemia in the setting of CKD 4-5 for which she had undergone elective PD catheter placement complicated by catheter site leaking. She had a complex hospital course, was initially placed on furosemide gtt without significant improvement and later underwent placement of tunneled line for HD with volume removal. During her second dialysis session she went into PEA arrest for which she was resuscitated, intubated and transferred to ICU. She was noted to have elevated troponin and subsequent TTE found reduced EF from 40-45% to 20-25% for which coronary angiography was recommended. Her hospital course was also complicated by dialysis line infection, significant bleeding around the catheter site, bradycardia and subsequent atrial fibrillation with RVR. Given the catheter site bleeding, anticoagulation was temporarily held. She has reported allergy to amiodarone, was initially placed on diltiazem for atrial fibrillation despite reduced EF. She discussed GOC at outside hospital where it appears that she lacked capacity per note and decided [place] [facility] At that time, General Surgery  was planning to place a left tunneled catheter due to concerns of the right side not being suitable     Upon admit, patient appeared drowsy but conversant. Daughter reports that she typically gets lethargic 2/2 to the volume overload due to not receiving dialysis. Patient reported b/l left lower extremity pain. Critical Care Medicine was consulted for emergent trialysis line placement and dialysis.     Ms. Taylor was admitted to MICU on 7/8/24 for urgent dialysis line placement and initiation of dialysis. Patient received short run of CRRT overnight without issue. Encephalopathy improving with continued CRRT treatments. She has had persistent AFib with RVR for which received Digoxin loading dose (renally dosed) and was restarted PO carvedilol. Patient remains in persistent AFib RVR despite therapeutic digoxin level and uptitration of carvedilol dosages so she was started on an amiodarone infusion. She was then transitioned to amio PO and started on heparin infusion for atrial fibrillation. General Surgery removed her peritoneal dialysis catheter on 7/15/24, but upon induction she vomited copious amounts of bile and was endotracheally intubated. She was extubated to nasal cannula on 7/16/24.     She has had difficulties with her tunneled HD line with sluggish flow especially through the venous side.  She would have a 4-hour instillation of tPA in each line before being able to run iHD, and even then it would be stopped before completion.  Interventional Nephrology plans to replace the line at some point next week.  Of note, Palliative Care was also consulted and patient had made her wishes known that she is a DNR code status.  She was still vacillating between replacing her tunneled HD line given her adverse event during her last procedure or going home with hospice.  The family was upset with her decision and April, her niece, asked that Palliative Care not return to see the patient.      Procedure(s)  (LRB):  PERMCATH REWIRE- TUNNELED CATH REWIRE (N/A)    Indwelling Lines/Drains at Time of Discharge:   Lines/Drains/Airways       Central Venous Catheter Line  Duration             Trialysis (Dialysis) Catheter 08/05/24 0925 right femoral 2 days              Drain  Duration                  Rectal Tube 07/28/24 0000 11 days         NG/OG Tube 08/06/24 1400 Right nostril 1 day                  Hospital Course:   Ms. Taylor was admitted to MICU on 7/8 for urgent dialysis line placement and initiated of HD. Patient received short run of CRRT overnight without issue. Encephalopathy improving with continued CRRT treatments. Persistent AFib with RVR, received Digoxin loading dose (renally dosed), restarted PO Carvedilol. Patient remains in persistent AFib RVR despite therapeutic digoxin level and uptitration of Carvedilol levels, started on Amiodarone infusion overnight. Transitioned to amio PO, started on heparin gtt for afib AC. Tolerated HD well overnight without complications and less encephalopathic upon exam. Planning for gen surg to remove PD catheter. Upon induction pt vomited copious amounts of bile, was intubated. PD cathter removed successfully. Extubated to NC 7/16.     7/18:  Patient made DNR after Palliative Care discussions yesterday.  Tolerated SLED 12 hours overnight.  Discussed difficulties regarding HD line with Interventional Nephrology and plans to replace it next week if patient it amenable.  Patient was stepped down from ICU.  7/19:  Stepped back up to medical ICU with plans for overnight SLED x 3 days.    On 7/19 Nephrology requested that the patient be stepped back up to the medical ICU for SLED. A temporary trialysis HD catheter was placed on 7/20/24 in the groin. She also began having diarrhea, which tested positive for Clostridium difficile so she was started on PO vancomycin 7/22/24. HD trial on 7/24; remained HDS. Ready for stepdown.    Stepped down to  on 7/26/24. On 8/2/2024, [facility]  re-consulted for hypotension (70s/40s) and increased somnolence. Nephrology following for dialysis needs. Placed on vancomycin and zosyn due to concern for line infection, removed left femoral line. LIJ trialysis placed. On 8/3 she developed tachycardia and soft pressures with signs of distress. Code cart was rolled into the room. AED pads were placed but never used. Tachycardia improved but she required the addition of a second pressor. Digoxin added PRN for tachycardia. After family discussion 8/5, patient made DNR. Patient accidentally removed left IJ trialysis 8/5, line could not be replaced in IJ due to blood clots. Line placed in left femoral vein. Receiving CRRT per nephro with fluid removal. NG inserted 8/6. In AM of 8/7, patient anemic, thrombocytopenic, and hypoglycemic. STAT ABG revealed pH 7.002/pCO2 43/pO2 26, emergent SLED began for clearance and volume removal. CPAP restarted, Lactate 16.57. Family (Maria Luisa and Nell) contacted, updated on current outlook. Plan to continue CPAP, Amiodarone and BP support, HD for now, hoping to extend time for family to visit, especially brother. Discontinued blood draws. Pain medicines for comfort ordered.     8/7/24: discussions with the children resulted in plans to transition to comfort focused care when all family contacted. Daughter, Maria Luisa, at bedside stated at 2200 that all family had been contacted and she was ready to transition care. Comfort care medications were ordered and patient was taken off dialysis,  called and vasopressors down-titrated. Time of death 0000 on 8/8/24.    Consults (From admission, onward)          Status Ordering Provider     Consult to Spiritual Care  Once        Provider:  (Not yet assigned)    Ordered ISRAEL CHO     Inpatient consult to Registered Dietitian/Nutritionist  Once        Provider:  (Not yet assigned)    Completed SEEMA ALTMAN     Inpatient consult to Palliative Care  Once        Provider:  (Not yet  assigned)    Completed ISRAEL REDDY     Inpatient consult to Critical Care Medicine  Once        Provider:  (Not yet assigned)    Completed TYESHA WESTFALL     Inpatient consult to Skin Integrity  Practitioner  Once        Provider:  Anaya Wong, NP    Completed TIANA NOVAK     Inpatient consult to Physical Medicine Rehab  Once        Provider:  (Not yet assigned)    Completed TIANA NOVAK     Inpatient consult to Interventional Cardiology  Once        Provider:  (Not yet assigned)    Completed HAND, RIGO C.     Inpatient consult to Gastroenterology  Once        Provider:  (Not yet assigned)    Completed CECILIO ERICKSON JR     Inpatient consult to Infectious Diseases  Once        Provider:  (Not yet assigned)    Completed HAND, RIGO C.     Inpatient consult to Midline team  Once        Provider:  (Not yet assigned)    Completed HAND, RIGO C.     Inpatient consult to Midline team  Once        Provider:  (Not yet assigned)    Completed ORIN NAYLOR     Inpatient consult to Critical Care Medicine  Once        Provider:  (Not yet assigned)    Completed HAND, RIGO C.     IP consult to Interventional Nephrology  Once        Provider:  (Not yet assigned)    Acknowledged ISRAEL REDDY     Inpatient consult to General Surgery  Once        Provider:  (Not yet assigned)    Completed LONA ARRIOLA     Inpatient consult to Critical Care Medicine  Once        Provider:  (Not yet assigned)    Completed HANK SHERMAN     Inpatient consult to Midline team  Once        Provider:  (Not yet assigned)    Completed LIO LEUNG     Inpatient consult to Palliative Care  Once        Provider:  (Not yet assigned)    Completed HANK SHERMAN     Inpatient consult to Midline team  Once        Provider:  (Not yet assigned)    Completed LIO LEUNG     Inpatient consult to Critical Care Medicine  Once        Provider:  (Not yet assigned)     Completed KEVIN ZAMBRANO     Inpatient consult to Midline team  Once        Provider:  (Not yet assigned)    Completed POLA DIA     Inpatient consult to Midline team  Once        Provider:  (Not yet assigned)    Completed POLA DIA     Inpatient consult to Midline team  Once        Provider:  (Not yet assigned)    Completed POLA DIA     Inpatient consult to Cardiology  Once        Provider:  (Not yet assigned)    Completed DAREN COBB     Inpatient consult to Nephrology  Once        Provider:  (Not yet assigned)    Completed DAREN COBB          Significant Labs:  All pertinent labs within the past 24 hours have been reviewed.    Significant Imaging:  I have reviewed all pertinent imaging results/findings within the past 24 hours.    Pending Diagnostic Studies:       Procedure Component Value Units Date/Time    APTT [[id number]] Collected: 08/01/24 1345    Order Status: Sent Lab Status: No result     Specimen: Blood     APTT [[id number]] Collected: 07/30/24 1133    Order Status: Sent Lab Status: No result     Specimen: Blood     Magnesium [[id number]] Collected: 08/04/24 1333    Order Status: Sent Lab Status: No result     Specimen: Blood     Magnesium [[id number]] Collected: 08/04/24 1333    Order Status: Sent Lab Status: No result     Specimen: Blood     Magnesium [[id number]] Collected: 08/04/24 1333    Order Status: Sent Lab Status: No result     Specimen: Blood     Magnesium [[id number]] Collected: 08/04/24 1333    Order Status: Sent Lab Status: No result     Specimen: Blood     Magnesium [[id number]] Collected: 08/04/24 1333    Order Status: Sent Lab Status: No result     Specimen: Blood     Magnesium [[id number]] Collected: 07/21/24 0306    Order Status: Sent Lab Status: No result     Specimen: Blood     Magnesium [[id number]] Collected: 07/20/24 0252    Order Status: Sent Lab Status: No result     Specimen: Blood     Magnesium [[id number]] Collected: 07/12/24 1233    Order  Status: Sent Lab Status: No result     Specimen: Blood     Magnesium [[id number]] Collected: 07/09/24 0325    Order Status: Sent Lab Status: No result     Specimen: Blood     Phosphorus [[id number]] Collected: 08/07/24 0419    Order Status: Sent Lab Status: In process Updated: 08/07/24 0419    Specimen: Blood     Renal function panel [[id number]] Collected: 08/07/24 0419    Order Status: Sent Lab Status: In process Updated: 08/07/24 0419    Specimen: Blood     Renal function panel [[id number]] Collected: 08/04/24 1333    Order Status: Sent Lab Status: No result     Specimen: Blood     Renal function panel [[id number]] Collected: 08/04/24 1333    Order Status: Sent Lab Status: No result     Specimen: Blood     Renal function panel [[id number]] Collected: 08/04/24 1333    Order Status: Sent Lab Status: No result     Specimen: Blood     Renal function panel [[id number]] Collected: 08/04/24 1333    Order Status: Sent Lab Status: No result     Specimen: Blood     Renal function panel [[id number]] Collected: 07/21/24 0306    Order Status: Sent Lab Status: No result     Specimen: Blood     Renal function panel [[id number]] Collected: 07/20/24 0252    Order Status: Sent Lab Status: No result     Specimen: Blood     Renal function panel [[id number]] Collected: 07/12/24 1233    Order Status: Sent Lab Status: No result     Specimen: Blood     Renal function panel [[id number]] Collected: 07/09/24 0325    Order Status: Sent Lab Status: No result     Specimen: Blood           Final Active Diagnoses:    Diagnosis Date Noted POA    PRINCIPAL PROBLEM:  Septic shock [A41.9, R65.21] 07/01/2024 Yes    Comfort measures only status [Z51.5] 08/07/2024 Not Applicable    Anemia in ESRD (end-stage renal disease) [N18.6, D63.1] 08/05/2024 Yes    Suspected deep tissue injury [R68.89] 08/02/2024 No    Acute encephalopathy [G93.40] 08/02/2024 Yes    Acute deep vein thrombosis (DVT) of femoral vein of left lower extremity [I82.412] 07/27/2024 No     Clostridium difficile infection [A49.8] 2024 Yes    Diarrhea [R19.7] 2024 No    Acute on chronic combined systolic and diastolic heart failure [I50.43] 2024 Yes    Atrial fibrillation with RVR [I48.91] 2024 Yes    Goals of care, counseling/discussion [Z71.89] 2024 Not Applicable    ESRD (end stage renal disease) [N18.6] 2022 Yes    Severe obesity (BMI >= 40) [E66.01] 2021 Yes    Essential hypertension [I10] 2017 Yes    Type 2 diabetes mellitus with microalbuminuria, without long-term current use of insulin [E11.29, R80.9] 2017 Yes     Chronic      Problems Resolved During this Admission:    Diagnosis Date Noted Date Resolved POA    On mechanically assisted ventilation [Z99.11] 07/15/2024 2024 Not Applicable    ACP (advance care planning) [Z71.89] 2024 Not Applicable    Palliative care encounter [Z51.5] 2024 Not Applicable    Hypoxic respiratory failure [J96.91] 2024 Yes    Acute respiratory failure with hypoxia [J96.01] 2024 Yes    CKD stage 4 secondary to hypertension [I12.9, N18.4] 2022 Yes    GERD (gastroesophageal reflux disease) [K21.9] 2020 Yes    Lower leg edema [R60.0] 2018 Yes     No new Assessment & Plan notes have been filed under this hospital service since the last note was generated.  Service: Critical Care Medicine    Discharged Condition:     Disposition: Home or Self Care      Patient Instructions:   No discharge procedures on file.  Medications:  None     Connie Ashby,   Critical Care Medicine  [facility] - Cardiac Medical ICU

## 2024-08-08 NOTE — PROGRESS NOTES
08/07/24 2236   Treatment   Treatment Type SLED   Treatment Status Blood returned   Dialyzer Time (hours) 6.03   BVP (Liters) 51.8 L   CRRT Comments blood returned by ICU RN

## 2024-08-09 LAB — POCT GLUCOSE: 101 MG/DL (ref 70–110)

## 2024-08-12 LAB — FUNGUS SPEC CULT: NORMAL

## 2024-11-20 NOTE — NURSING
Patient POCT glucose 63 mg/dL  gave dextrose 10% bolus 125 mL 125 mL, rechecked patient blood sugar it is 97 mg/dL    no

## (undated) DEVICE — NDL HYPO REG 25G X 1 1/2

## (undated) DEVICE — SYR 10CC LUER LOCK

## (undated) DEVICE — TRAY MINOR SMH

## (undated) DEVICE — TRAY CATH LAB OMC

## (undated) DEVICE — DRESSING CHG FOAM 7MM 1IN

## (undated) DEVICE — DRAPE C ARM 42 X 120 10/BX

## (undated) DEVICE — SUT MCRYL PLUS 4-0 PS2 27IN

## (undated) DEVICE — SUT 0 VICRYL / UR6 (J603)

## (undated) DEVICE — DRESSING SORVAVIEW SHIELD

## (undated) DEVICE — KIT PROCEDURE STER INLET CLOSU

## (undated) DEVICE — SUT MONOCRYL 3-0 PS-1

## (undated) DEVICE — NDL ECLIPSE SAF REG 25GX1.5IN

## (undated) DEVICE — GUIDEWIRE ZIPWIRE .035 150CM L

## (undated) DEVICE — TOWEL OR DISP STRL BLUE 4/PK

## (undated) DEVICE — SUT SILK 2-0 PS 18IN BLACK

## (undated) DEVICE — BLADE SURG CARBON STEEL SZ11

## (undated) DEVICE — TRAY MINOR GEN SURG OMC

## (undated) DEVICE — SPONGE GELFOAM 12-7MM

## (undated) DEVICE — SYR DISP LL 5CC

## (undated) DEVICE — DECANTER FLUID TRNSF WHITE 9IN

## (undated) DEVICE — SLEEVE ECLIPSE GOWN STRL 23IN

## (undated) DEVICE — DURAPREP SURG SCRUB 26ML

## (undated) DEVICE — SUT MONOCRYL 4-0 PS-2

## (undated) DEVICE — SUT COATED VICRYL 4/0 27IN

## (undated) DEVICE — NDL ECLIPSE SAFETY 23G 1.5IN

## (undated) DEVICE — SOL NACL IRR 1000ML BTL

## (undated) DEVICE — DRAPE LAP T SHT W/ INSTR PAD

## (undated) DEVICE — GOWN SMART IMP BREATHABLE XXLG

## (undated) DEVICE — GLOVE GAMMEX SURG LF PI SZ 7.5

## (undated) DEVICE — COVER PROBE US 5.5X58L NON LTX

## (undated) DEVICE — GUIDEWIRE EMERALD 150CM PTFE

## (undated) DEVICE — GOWN SURGICAL X-LARGE

## (undated) DEVICE — TROCAR ENDOPATH XCEL 11MM 10CM

## (undated) DEVICE — ELECTRODE REM PLYHSV RETURN 9

## (undated) DEVICE — GUIDEWIRE STF .035X180CM STR

## (undated) DEVICE — BOVIE SUCTION

## (undated) DEVICE — KIT MICROINTRO 4F .018X40X7CM

## (undated) DEVICE — DRAPE T THYROID STERILE

## (undated) DEVICE — ADHESIVE DERMABOND ADVANCED

## (undated) DEVICE — ELECTRODE BLADE INSULATED 1 IN

## (undated) DEVICE — GLOVE BIOGEL SKINSENSE PI 7.0